# Patient Record
Sex: FEMALE | Race: WHITE | NOT HISPANIC OR LATINO | Employment: OTHER | URBAN - METROPOLITAN AREA
[De-identification: names, ages, dates, MRNs, and addresses within clinical notes are randomized per-mention and may not be internally consistent; named-entity substitution may affect disease eponyms.]

---

## 2019-11-12 ENCOUNTER — APPOINTMENT (EMERGENCY)
Dept: RADIOLOGY | Facility: HOSPITAL | Age: 44
End: 2019-11-12
Payer: COMMERCIAL

## 2019-11-12 ENCOUNTER — APPOINTMENT (EMERGENCY)
Dept: CT IMAGING | Facility: HOSPITAL | Age: 44
End: 2019-11-12
Payer: COMMERCIAL

## 2019-11-12 ENCOUNTER — HOSPITAL ENCOUNTER (EMERGENCY)
Facility: HOSPITAL | Age: 44
Discharge: ANOTHER HOSPITAL | End: 2019-11-12
Attending: EMERGENCY MEDICINE | Admitting: EMERGENCY MEDICINE
Payer: COMMERCIAL

## 2019-11-12 VITALS
RESPIRATION RATE: 11 BRPM | HEART RATE: 82 BPM | OXYGEN SATURATION: 93 % | BODY MASS INDEX: 22.07 KG/M2 | HEIGHT: 62 IN | SYSTOLIC BLOOD PRESSURE: 113 MMHG | WEIGHT: 119.93 LBS | TEMPERATURE: 98.8 F | DIASTOLIC BLOOD PRESSURE: 67 MMHG

## 2019-11-12 DIAGNOSIS — R09.02 HYPOXIA: Primary | ICD-10-CM

## 2019-11-12 LAB
ALBUMIN SERPL BCP-MCNC: 3.3 G/DL (ref 3.5–5)
ALP SERPL-CCNC: 108 U/L (ref 46–116)
ALT SERPL W P-5'-P-CCNC: 30 U/L (ref 12–78)
ANION GAP SERPL CALCULATED.3IONS-SCNC: 16 MMOL/L (ref 4–13)
AST SERPL W P-5'-P-CCNC: 22 U/L (ref 5–45)
ATRIAL RATE: 106 BPM
BASOPHILS # BLD AUTO: 0.05 THOUSANDS/ΜL (ref 0–0.1)
BASOPHILS NFR BLD AUTO: 0 % (ref 0–1)
BILIRUB DIRECT SERPL-MCNC: 0.09 MG/DL (ref 0–0.2)
BILIRUB SERPL-MCNC: 0.4 MG/DL (ref 0.2–1)
BILIRUB UR QL STRIP: NEGATIVE
BUN SERPL-MCNC: 20 MG/DL (ref 5–25)
CA-I BLD-SCNC: 1.15 MMOL/L (ref 1.12–1.32)
CALCIUM SERPL-MCNC: 8.8 MG/DL (ref 8.3–10.1)
CHLORIDE SERPL-SCNC: 105 MMOL/L (ref 100–108)
CLARITY UR: CLEAR
CO2 SERPL-SCNC: 22 MMOL/L (ref 21–32)
COLOR UR: YELLOW
CREAT SERPL-MCNC: 1.03 MG/DL (ref 0.6–1.3)
EOSINOPHIL # BLD AUTO: 0.1 THOUSAND/ΜL (ref 0–0.61)
EOSINOPHIL NFR BLD AUTO: 1 % (ref 0–6)
ERYTHROCYTE [DISTWIDTH] IN BLOOD BY AUTOMATED COUNT: 12.3 % (ref 11.6–15.1)
FLUAV RNA NPH QL NAA+PROBE: NORMAL
FLUBV RNA NPH QL NAA+PROBE: NORMAL
GFR SERPL CREATININE-BSD FRML MDRD: 68 ML/MIN/1.73SQ M
GLUCOSE SERPL-MCNC: 109 MG/DL (ref 65–140)
GLUCOSE UR STRIP-MCNC: NEGATIVE MG/DL
HCT VFR BLD AUTO: 41.4 % (ref 34.8–46.1)
HGB BLD-MCNC: 13.5 G/DL (ref 11.5–15.4)
HGB UR QL STRIP.AUTO: NEGATIVE
IMM GRANULOCYTES # BLD AUTO: 0.1 THOUSAND/UL (ref 0–0.2)
IMM GRANULOCYTES NFR BLD AUTO: 1 % (ref 0–2)
KETONES UR STRIP-MCNC: NEGATIVE MG/DL
LACTATE SERPL-SCNC: 1.8 MMOL/L (ref 0.5–2)
LEUKOCYTE ESTERASE UR QL STRIP: NEGATIVE
LYMPHOCYTES # BLD AUTO: 1.51 THOUSANDS/ΜL (ref 0.6–4.47)
LYMPHOCYTES NFR BLD AUTO: 11 % (ref 14–44)
MAGNESIUM SERPL-MCNC: 1.3 MG/DL (ref 1.6–2.6)
MCH RBC QN AUTO: 31.6 PG (ref 26.8–34.3)
MCHC RBC AUTO-ENTMCNC: 32.6 G/DL (ref 31.4–37.4)
MCV RBC AUTO: 97 FL (ref 82–98)
MONOCYTES # BLD AUTO: 0.75 THOUSAND/ΜL (ref 0.17–1.22)
MONOCYTES NFR BLD AUTO: 6 % (ref 4–12)
NEUTROPHILS # BLD AUTO: 10.69 THOUSANDS/ΜL (ref 1.85–7.62)
NEUTS SEG NFR BLD AUTO: 81 % (ref 43–75)
NITRITE UR QL STRIP: NEGATIVE
NRBC BLD AUTO-RTO: 0 /100 WBCS
NT-PROBNP SERPL-MCNC: 218 PG/ML
P AXIS: 63 DEGREES
PH UR STRIP.AUTO: 5.5 [PH]
PHOSPHATE SERPL-MCNC: 2.6 MG/DL (ref 2.7–4.5)
PLATELET # BLD AUTO: 189 THOUSANDS/UL (ref 149–390)
PMV BLD AUTO: 9.2 FL (ref 8.9–12.7)
POTASSIUM SERPL-SCNC: 3.5 MMOL/L (ref 3.5–5.3)
PR INTERVAL: 134 MS
PROT SERPL-MCNC: 7.4 G/DL (ref 6.4–8.2)
PROT UR STRIP-MCNC: NEGATIVE MG/DL
QRS AXIS: 78 DEGREES
QRSD INTERVAL: 72 MS
QT INTERVAL: 316 MS
QTC INTERVAL: 419 MS
RBC # BLD AUTO: 4.27 MILLION/UL (ref 3.81–5.12)
RSV RNA NPH QL NAA+PROBE: NORMAL
SODIUM SERPL-SCNC: 143 MMOL/L (ref 136–145)
SP GR UR STRIP.AUTO: 1.02 (ref 1–1.03)
T WAVE AXIS: 53 DEGREES
TROPONIN I SERPL-MCNC: <0.02 NG/ML
UROBILINOGEN UR QL STRIP.AUTO: 0.2 E.U./DL
VENTRICULAR RATE: 106 BPM
WBC # BLD AUTO: 13.2 THOUSAND/UL (ref 4.31–10.16)

## 2019-11-12 PROCEDURE — 71046 X-RAY EXAM CHEST 2 VIEWS: CPT

## 2019-11-12 PROCEDURE — 87205 SMEAR GRAM STAIN: CPT | Performed by: PHYSICIAN ASSISTANT

## 2019-11-12 PROCEDURE — 87631 RESP VIRUS 3-5 TARGETS: CPT | Performed by: PHYSICIAN ASSISTANT

## 2019-11-12 PROCEDURE — 82330 ASSAY OF CALCIUM: CPT | Performed by: PHYSICIAN ASSISTANT

## 2019-11-12 PROCEDURE — 84484 ASSAY OF TROPONIN QUANT: CPT | Performed by: PHYSICIAN ASSISTANT

## 2019-11-12 PROCEDURE — 87040 BLOOD CULTURE FOR BACTERIA: CPT | Performed by: PHYSICIAN ASSISTANT

## 2019-11-12 PROCEDURE — 36415 COLL VENOUS BLD VENIPUNCTURE: CPT | Performed by: PHYSICIAN ASSISTANT

## 2019-11-12 PROCEDURE — 96366 THER/PROPH/DIAG IV INF ADDON: CPT

## 2019-11-12 PROCEDURE — 85025 COMPLETE CBC W/AUTO DIFF WBC: CPT | Performed by: PHYSICIAN ASSISTANT

## 2019-11-12 PROCEDURE — 87081 CULTURE SCREEN ONLY: CPT | Performed by: PHYSICIAN ASSISTANT

## 2019-11-12 PROCEDURE — 96361 HYDRATE IV INFUSION ADD-ON: CPT

## 2019-11-12 PROCEDURE — 80076 HEPATIC FUNCTION PANEL: CPT | Performed by: PHYSICIAN ASSISTANT

## 2019-11-12 PROCEDURE — 96367 TX/PROPH/DG ADDL SEQ IV INF: CPT

## 2019-11-12 PROCEDURE — 93005 ELECTROCARDIOGRAM TRACING: CPT

## 2019-11-12 PROCEDURE — 96365 THER/PROPH/DIAG IV INF INIT: CPT

## 2019-11-12 PROCEDURE — 96368 THER/DIAG CONCURRENT INF: CPT

## 2019-11-12 PROCEDURE — 71250 CT THORAX DX C-: CPT

## 2019-11-12 PROCEDURE — 99283 EMERGENCY DEPT VISIT LOW MDM: CPT | Performed by: PHYSICIAN ASSISTANT

## 2019-11-12 PROCEDURE — 96376 TX/PRO/DX INJ SAME DRUG ADON: CPT

## 2019-11-12 PROCEDURE — 87186 SC STD MICRODIL/AGAR DIL: CPT | Performed by: PHYSICIAN ASSISTANT

## 2019-11-12 PROCEDURE — 84100 ASSAY OF PHOSPHORUS: CPT | Performed by: PHYSICIAN ASSISTANT

## 2019-11-12 PROCEDURE — 93010 ELECTROCARDIOGRAM REPORT: CPT | Performed by: INTERNAL MEDICINE

## 2019-11-12 PROCEDURE — 87077 CULTURE AEROBIC IDENTIFY: CPT | Performed by: PHYSICIAN ASSISTANT

## 2019-11-12 PROCEDURE — 87070 CULTURE OTHR SPECIMN AEROBIC: CPT | Performed by: PHYSICIAN ASSISTANT

## 2019-11-12 PROCEDURE — 87184 SC STD DISK METHOD PER PLATE: CPT | Performed by: PHYSICIAN ASSISTANT

## 2019-11-12 PROCEDURE — 83735 ASSAY OF MAGNESIUM: CPT | Performed by: PHYSICIAN ASSISTANT

## 2019-11-12 PROCEDURE — 83880 ASSAY OF NATRIURETIC PEPTIDE: CPT | Performed by: PHYSICIAN ASSISTANT

## 2019-11-12 PROCEDURE — 99285 EMERGENCY DEPT VISIT HI MDM: CPT

## 2019-11-12 PROCEDURE — 80048 BASIC METABOLIC PNL TOTAL CA: CPT | Performed by: PHYSICIAN ASSISTANT

## 2019-11-12 PROCEDURE — 81003 URINALYSIS AUTO W/O SCOPE: CPT | Performed by: PHYSICIAN ASSISTANT

## 2019-11-12 PROCEDURE — 87185 SC STD ENZYME DETCJ PER NZM: CPT | Performed by: PHYSICIAN ASSISTANT

## 2019-11-12 PROCEDURE — 96375 TX/PRO/DX INJ NEW DRUG ADDON: CPT

## 2019-11-12 PROCEDURE — 83605 ASSAY OF LACTIC ACID: CPT | Performed by: PHYSICIAN ASSISTANT

## 2019-11-12 RX ORDER — ATOVAQUONE 750 MG/5ML
750 SUSPENSION ORAL DAILY
COMMUNITY
End: 2020-03-07 | Stop reason: HOSPADM

## 2019-11-12 RX ORDER — LINEZOLID 2 MG/ML
600 INJECTION, SOLUTION INTRAVENOUS EVERY 12 HOURS
Status: DISCONTINUED | OUTPATIENT
Start: 2019-11-12 | End: 2019-11-12 | Stop reason: HOSPADM

## 2019-11-12 RX ORDER — ALPRAZOLAM 1 MG/1
TABLET ORAL 2 TIMES DAILY PRN
COMMUNITY
End: 2020-03-07 | Stop reason: HOSPADM

## 2019-11-12 RX ORDER — DEXLANSOPRAZOLE 60 MG/1
60 CAPSULE, DELAYED RELEASE ORAL DAILY
COMMUNITY
End: 2020-03-07 | Stop reason: HOSPADM

## 2019-11-12 RX ORDER — PREDNISONE 10 MG/1
TABLET ORAL DAILY
COMMUNITY
End: 2020-03-07 | Stop reason: HOSPADM

## 2019-11-12 RX ORDER — TACROLIMUS 1 MG/1
2 CAPSULE ORAL EVERY 12 HOURS SCHEDULED
COMMUNITY
End: 2020-03-07 | Stop reason: HOSPADM

## 2019-11-12 RX ORDER — SODIUM CHLORIDE, SODIUM GLUCONATE, SODIUM ACETATE, POTASSIUM CHLORIDE, MAGNESIUM CHLORIDE, SODIUM PHOSPHATE, DIBASIC, AND POTASSIUM PHOSPHATE .53; .5; .37; .037; .03; .012; .00082 G/100ML; G/100ML; G/100ML; G/100ML; G/100ML; G/100ML; G/100ML
100 INJECTION, SOLUTION INTRAVENOUS CONTINUOUS
Status: DISCONTINUED | OUTPATIENT
Start: 2019-11-12 | End: 2019-11-12

## 2019-11-12 RX ORDER — MAGNESIUM SULFATE HEPTAHYDRATE 40 MG/ML
2 INJECTION, SOLUTION INTRAVENOUS ONCE
Status: COMPLETED | OUTPATIENT
Start: 2019-11-12 | End: 2019-11-12

## 2019-11-12 RX ORDER — ONDANSETRON 2 MG/ML
4 INJECTION INTRAMUSCULAR; INTRAVENOUS ONCE
Status: COMPLETED | OUTPATIENT
Start: 2019-11-12 | End: 2019-11-12

## 2019-11-12 RX ORDER — HYDROMORPHONE HCL/PF 1 MG/ML
0.5 SYRINGE (ML) INJECTION ONCE
Status: COMPLETED | OUTPATIENT
Start: 2019-11-12 | End: 2019-11-12

## 2019-11-12 RX ORDER — HYDROMORPHONE HCL/PF 1 MG/ML
0.5 SYRINGE (ML) INJECTION ONCE
Status: DISCONTINUED | OUTPATIENT
Start: 2019-11-12 | End: 2019-11-12

## 2019-11-12 RX ORDER — VALGANCICLOVIR 450 MG/1
900 TABLET, FILM COATED ORAL
COMMUNITY
End: 2020-03-07 | Stop reason: HOSPADM

## 2019-11-12 RX ORDER — DRONABINOL 5 MG/1
5 CAPSULE ORAL
COMMUNITY
End: 2020-03-07 | Stop reason: HOSPADM

## 2019-11-12 RX ORDER — ALENDRONATE SODIUM 70 MG/1
70 TABLET ORAL
COMMUNITY
End: 2020-03-07 | Stop reason: HOSPADM

## 2019-11-12 RX ORDER — ACETAMINOPHEN 325 MG/1
650 TABLET ORAL ONCE
Status: COMPLETED | OUTPATIENT
Start: 2019-11-12 | End: 2019-11-12

## 2019-11-12 RX ORDER — HYDROMORPHONE HCL/PF 1 MG/ML
0.5 SYRINGE (ML) INJECTION
Status: DISCONTINUED | OUTPATIENT
Start: 2019-11-12 | End: 2019-11-12 | Stop reason: HOSPADM

## 2019-11-12 RX ORDER — DIPHENOXYLATE HYDROCHLORIDE AND ATROPINE SULFATE 2.5; .025 MG/1; MG/1
1 TABLET ORAL DAILY
COMMUNITY
End: 2020-03-07 | Stop reason: HOSPADM

## 2019-11-12 RX ORDER — ALBUTEROL SULFATE 1.25 MG/3ML
1 SOLUTION RESPIRATORY (INHALATION) EVERY 4 HOURS PRN
COMMUNITY
End: 2021-09-26 | Stop reason: HOSPADM

## 2019-11-12 RX ORDER — SODIUM CHLORIDE 9 MG/ML
100 INJECTION, SOLUTION INTRAVENOUS CONTINUOUS
Status: DISCONTINUED | OUTPATIENT
Start: 2019-11-12 | End: 2019-11-12 | Stop reason: HOSPADM

## 2019-11-12 RX ORDER — GABAPENTIN 100 MG/1
100 CAPSULE ORAL 2 TIMES DAILY PRN
COMMUNITY
End: 2020-03-07 | Stop reason: HOSPADM

## 2019-11-12 RX ORDER — FERROUS SULFATE 325(65) MG
325 TABLET ORAL 2 TIMES DAILY WITH MEALS
COMMUNITY
End: 2020-03-07 | Stop reason: HOSPADM

## 2019-11-12 RX ADMIN — ONDANSETRON 4 MG: 2 INJECTION INTRAMUSCULAR; INTRAVENOUS at 20:15

## 2019-11-12 RX ADMIN — ACETAMINOPHEN 650 MG: 325 TABLET, FILM COATED ORAL at 17:51

## 2019-11-12 RX ADMIN — SODIUM CHLORIDE 1000 ML: 0.9 INJECTION, SOLUTION INTRAVENOUS at 10:02

## 2019-11-12 RX ADMIN — SODIUM CHLORIDE 100 ML/HR: 0.9 INJECTION, SOLUTION INTRAVENOUS at 17:35

## 2019-11-12 RX ADMIN — HYDROMORPHONE HYDROCHLORIDE 0.5 MG: 1 INJECTION, SOLUTION INTRAMUSCULAR; INTRAVENOUS; SUBCUTANEOUS at 10:12

## 2019-11-12 RX ADMIN — SODIUM CHLORIDE 1000 ML: 0.9 INJECTION, SOLUTION INTRAVENOUS at 13:58

## 2019-11-12 RX ADMIN — HYDROMORPHONE HYDROCHLORIDE 0.5 MG: 1 INJECTION, SOLUTION INTRAMUSCULAR; INTRAVENOUS; SUBCUTANEOUS at 16:28

## 2019-11-12 RX ADMIN — HYDROMORPHONE HYDROCHLORIDE 0.5 MG: 1 INJECTION, SOLUTION INTRAMUSCULAR; INTRAVENOUS; SUBCUTANEOUS at 20:09

## 2019-11-12 RX ADMIN — ONDANSETRON 4 MG: 2 INJECTION INTRAMUSCULAR; INTRAVENOUS at 12:29

## 2019-11-12 RX ADMIN — LINEZOLID 600 MG: 600 INJECTION, SOLUTION INTRAVENOUS at 11:01

## 2019-11-12 RX ADMIN — PANCRELIPASE 24000 UNITS: 24000; 76000; 120000 CAPSULE, DELAYED RELEASE PELLETS ORAL at 15:01

## 2019-11-12 RX ADMIN — PANCRELIPASE 24000 UNITS: 24000; 76000; 120000 CAPSULE, DELAYED RELEASE PELLETS ORAL at 14:54

## 2019-11-12 RX ADMIN — MAGNESIUM SULFATE HEPTAHYDRATE 2 G: 40 INJECTION, SOLUTION INTRAVENOUS at 13:35

## 2019-11-12 RX ADMIN — HYDROMORPHONE HYDROCHLORIDE 0.5 MG: 1 INJECTION, SOLUTION INTRAMUSCULAR; INTRAVENOUS; SUBCUTANEOUS at 12:29

## 2019-11-12 RX ADMIN — MEROPENEM 1000 MG: 1 INJECTION, POWDER, FOR SOLUTION INTRAVENOUS at 12:07

## 2019-11-12 RX ADMIN — POTASSIUM & SODIUM PHOSPHATES POWDER PACK 280-160-250 MG 1 PACKET: 280-160-250 PACK at 13:41

## 2019-11-12 NOTE — ED PROVIDER NOTES
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, DVT related to prolonged hospital stay who presents to the ED w shortness of breath  She has been intubated multiple times, has had a tracehostomy, was cannulated for VV ECMO in June/ July of 2018 for AHRF, ptx  Was admitted in June of 2018 for R sided pna and AHRF  Presents today w L sided lower side and chest pain  Developed sob this morning, checked her home O2 sat which was 84% on RA, does not wear home O2  Daughter had the flu about a week ago so she did complete a prophylactic course of tamifu which finished yesterday  She began coughing up green phlegm today  Feels more sob w ambulating  Pain worsened w coughing  No pain on inspiration  Fever of 101 at home this morning for which she took tylenol  No abdominal pain although has had a decreased appetite over the past few days  Was feeling slightly under the weather the past few days but symptoms all worsened this morning  None       Past Medical History:   Diagnosis Date    Asthma     Chronic pain     Cystic fibrosis ([facility] [address] )     Transplant recipient        Past Surgical History:   Procedure Laterality Date    LUNG SURGERY         History reviewed  No pertinent family history  I have reviewed and agree with the history as documented  Social History     Tobacco Use    Smoking status: Never Smoker    Smokeless tobacco: Never Used   Substance Use Topics    Alcohol use: Not Currently     Frequency: Never    Drug use: Never        Review of Systems   Constitutional: Positive for activity change, appetite change and fever  Negative for chills and diaphoresis  HENT: Negative for congestion and sore throat  Eyes: Negative for visual disturbance  Respiratory: Positive for cough and shortness of breath  Negative for chest tightness and wheezing  Cardiovascular: Negative for chest pain and leg swelling  Gastrointestinal: Negative for abdominal pain, constipation, diarrhea, nausea and vomiting  Genitourinary: Negative for difficulty urinating, dysuria, frequency, hematuria, urgency, vaginal bleeding, vaginal discharge and vaginal pain  Musculoskeletal: Negative for arthralgias and myalgias  Neurological: Negative for dizziness, weakness, light-headedness, numbness and headaches  Psychiatric/Behavioral: The patient is not nervous/anxious  Physical Exam  Physical Exam   Constitutional: She is oriented to person, place, and time  She appears well-developed and well-nourished  No distress  HENT:   Head: Normocephalic and atraumatic  Dry mucous membranes   Eyes: Pupils are equal, round, and reactive to light  Neck: Neck supple  No tracheal deviation present  Cardiovascular: Normal rate, regular rhythm and intact distal pulses  Exam reveals no gallop and no friction rub  No murmur heard  Pulmonary/Chest: Effort normal  No respiratory distress  She has no wheezes  She has rhonchi in the right middle field, the right lower field, the left middle field and the left lower field  She has no rales  Abdominal: Soft  Bowel sounds are normal  She exhibits no distension and no mass  There is no tenderness  There is no guarding  Musculoskeletal: She exhibits no edema or deformity  Neurological: She is alert and oriented to person, place, and time  Skin: Skin is warm and dry  She is not diaphoretic  Dry skin   Psychiatric: She has a normal mood and affect  Her behavior is normal    Nursing note and vitals reviewed        Vital Signs  ED Triage Vitals [11/12/19 0914]   Temperature Pulse Respirations Blood Pressure SpO2   (!) 101 °F (38 3 °C) (!) 107 (!) 24 136/81 91 %      Temp Source Heart Rate Source Patient Position - Orthostatic VS BP Location FiO2 (%)   Oral Monitor Sitting Right arm --      Pain Score       7           Vitals:    11/12/19 1200 11/12/19 1230 11/12/19 1343 11/12/19 1430   BP: 109/66 107/72 96/58 101/55   Pulse: 79 89 82 72   Patient Position - Orthostatic VS: Lying Lying Sitting Lying         Visual Acuity      ED Medications  Medications   meropenem (MERREM) 1,000 mg in sodium chloride 0 9 % 100 mL IVPB (0 mg Intravenous Stopped 11/12/19 1529)   linezolid (ZYVOX) IVPB (premix) 600 mg (0 mg Intravenous Stopped 11/12/19 1204)   potassium-sodium phosphates (PHOS-NAK) packet 1 packet (1 packet Oral Given 11/12/19 1341)   sodium chloride 0 9 % infusion (has no administration in time range)   HYDROmorphone (DILAUDID) injection 0 5 mg (0 5 mg Intravenous Given 11/12/19 1628)   sodium chloride 0 9 % bolus 1,000 mL (0 mL Intravenous Stopped 11/12/19 1209)   HYDROmorphone (DILAUDID) injection 0 5 mg (0 5 mg Intravenous Given 11/12/19 1012)   magnesium sulfate 2 g/50 mL IVPB (premix) 2 g (0 g Intravenous Stopped 11/12/19 1547)   ondansetron (ZOFRAN) injection 4 mg (4 mg Intravenous Given 11/12/19 1229)   HYDROmorphone (DILAUDID) injection 0 5 mg (0 5 mg Intravenous Given 11/12/19 1229)   pancrelipase (Lip-Prot-Amyl) (CREON) delayed release capsule 24,000 Units (24,000 Units Oral Given 11/12/19 1501)   pancrelipase (Lip-Prot-Amyl) (CREON) delayed release capsule 24,000 Units (24,000 Units Oral Given 11/12/19 1454)   sodium chloride 0 9 % bolus 1,000 mL (1,000 mL Intravenous New Bag 11/12/19 1358)       Diagnostic Studies  Results Reviewed     Procedure Component Value Units Date/Time    UA w Reflex to Microscopic w Reflex to Culture [[id number]] Collected:  11/12/19 4496    Lab Status:  Final result Specimen:  Urine, Clean Catch Updated:  11/12/19 1554     Color, UA Yellow     Clarity, UA Clear     Specific Gravity, UA 1 025     pH, UA 5 5     Leukocytes, UA Negative     Nitrite, UA Negative Protein, UA Negative mg/dl      Glucose, UA Negative mg/dl      Ketones, UA Negative mg/dl      Urobilinogen, UA 0 2 E U /dl      Bilirubin, UA Negative     Blood, UA Negative    Blood culture #1 [[id number]] Collected:  11/12/19 1004    Lab Status:  Preliminary result Specimen:  Blood from Arm, Right Updated:  11/12/19 1401     Blood Culture Received in Microbiology Lab  Culture in Progress  Blood culture #2 [[id number]] Collected:  11/12/19 1001    Lab Status:  Preliminary result Specimen:  Blood from Central Venous Line Updated:  11/12/19 1401     Blood Culture Received in Microbiology Lab  Culture in Progress  Sputum culture and Gram stain [[id number]] Collected:  11/12/19 1215    Lab Status: In process Specimen:  Expectorated Sputum Updated:  11/12/19 1218    Influenza A/B and RSV PCR [[id number]]  (Normal) Collected:  11/12/19 1006    Lab Status:  Final result Specimen:  Nose Updated:  11/12/19 1131     INFLUENZA A PCR None Detected     INFLUENZA B PCR None Detected     RSV PCR None Detected    Lactic acid, plasma [[id number]]  (Normal) Collected:  11/12/19 1001    Lab Status:  Final result Specimen:  Blood from Central Venous Line Updated:  11/12/19 1048     LACTIC ACID 1 8 mmol/L     Narrative:       Result may be elevated if tourniquet was used during collection      Basic metabolic panel [[id number]]  (Abnormal) Collected:  11/12/19 1001    Lab Status:  Final result Specimen:  Blood from Central Venous Line Updated:  11/12/19 1037     Sodium 143 mmol/L      Potassium 3 5 mmol/L      Chloride 105 mmol/L      CO2 22 mmol/L      ANION GAP 16 mmol/L      BUN 20 mg/dL      Creatinine 1 03 mg/dL      Glucose 109 mg/dL      Calcium 8 8 mg/dL      eGFR 68 ml/min/1 73sq m     Narrative:       [place] guidelines for Chronic Kidney Disease (CKD):     Stage 1 with normal or high GFR (GFR > 90 mL/min/1 73 square meters)    Stage 2 Mild CKD (GFR = 60-89 mL/min/1 73 square meters)    Stage 3A Moderate CKD (GFR = 45-59 mL/min/1 73 square meters)    Stage 3B Moderate CKD (GFR = 30-44 mL/min/1 73 square meters)    Stage 4 Severe CKD (GFR = 15-29 mL/min/1 73 square meters)    Stage 5 End Stage CKD (GFR <15 mL/min/1 73 square meters)  Note: GFR calculation is accurate only with a steady state creatinine    Hepatic function panel [[id number]]  (Abnormal) Collected:  11/12/19 1001    Lab Status:  Final result Specimen:  Blood from Central Venous Line Updated:  11/12/19 1037     Total Bilirubin 0 40 mg/dL      Bilirubin, Direct 0 09 mg/dL      Alkaline Phosphatase 108 U/L      AST 22 U/L      ALT 30 U/L      Total Protein 7 4 g/dL      Albumin 3 3 g/dL     NT-BNP PRO [[id number]]  (Abnormal) Collected:  11/12/19 1001    Lab Status:  Final result Specimen:  Blood from Central Venous Line Updated:  11/12/19 1037     NT-proBNP 218 pg/mL     Magnesium [[id number]]  (Abnormal) Collected:  11/12/19 1001    Lab Status:  Final result Specimen:  Blood from Central Venous Line Updated:  11/12/19 1033     Magnesium 1 3 mg/dL     Phosphorus [[id number]]  (Abnormal) Collected:  11/12/19 1001    Lab Status:  Final result Specimen:  Blood from Central Venous Line Updated:  11/12/19 1033     Phosphorus 2 6 mg/dL     Calcium, ionized [[id number]]  (Normal) Collected:  11/12/19 1001    Lab Status:  Final result Specimen:  Blood from Arm, Right Updated:  11/12/19 1030     Calcium, Ionized 1 15 mmol/L     Troponin I [[id number]]  (Normal) Collected:  11/12/19 1001    Lab Status:  Final result Specimen:  Blood from Arm, Right Updated:  11/12/19 1030     Troponin I <0 02 ng/mL     MRSA culture [[id number]] Collected:  11/12/19 1006    Lab Status:   In process Specimen:  Nares from Nose Updated:  11/12/19 1020    CBC and differential [[id number]]  (Abnormal) Collected:  11/12/19 1001    Lab Status:  Final result Specimen:  Blood from Central Venous Line Updated:  11/12/19 1014     WBC 13 20 Thousand/uL      RBC 4 27 Million/uL Hemoglobin 13 5 g/dL      Hematocrit 41 4 %      MCV 97 fL      MCH 31 6 pg      MCHC 32 6 g/dL      RDW 12 3 %      MPV 9 2 fL      Platelets 187 Thousands/uL      nRBC 0 /100 WBCs      Neutrophils Relative 81 %      Immat GRANS % 1 %      Lymphocytes Relative 11 %      Monocytes Relative 6 %      Eosinophils Relative 1 %      Basophils Relative 0 %      Neutrophils Absolute 10 69 Thousands/µL      Immature Grans Absolute 0 10 Thousand/uL      Lymphocytes Absolute 1 51 Thousands/µL      Monocytes Absolute 0 75 Thousand/µL      Eosinophils Absolute 0 10 Thousand/µL      Basophils Absolute 0 05 Thousands/µL                  CT chest without contrast   Final Result by Hiram Jesus MD (11/12 1126)      1  Tree-in-bud and groundglass opacities throughout the left lung, consistent with developing pneumonia   2  Bronchiectasis throughout the right lung consistent with chronic rejection      The study was marked in EPIC for immediate notification  Workstation performed: [id number]         XR chest 2 views   Final Result by Sussy Lechuga MD (11/12 1028)      No acute cardiopulmonary disease  Double lung transplant  Workstation performed: [id number]                    Procedures  Procedures       ED Course  ED Course as of Nov 12 1657   Tue Nov 12, 2019   1021 Discussed case w Dr Arturo Salmon from [facility] who is one of the patient's transplant specialists  He advised starting meropenem and linezolid  Qtc, platelet count are appropriate  We will hold off on CTA as her DVT hx seems linked to prolonged hospitalization and infectious etiology seems much more likely  Will limit dye load for now and obtain CT chest wo        1028 EKG Interpretation    Rate: 106 BPM  Rhythm: sinus tachycardia  Axis: normal  Intervals: Normal, no blocks, QTc 419ms  Q waves: no  T waves: flattened somewhat in III  ST segments: no dep / elevation     Impression: abnormal EKG   EKG for comparison: no prior within out network  EKG interpreted by me  Initial Sepsis Screening     Row Name 11/12/19 0935                Is the patient's history suggestive of a new or worsening infection? (!) Yes (Proceed)  -SB        Suspected source of infection  pneumonia  -SB        Are two or more of the following signs & symptoms of infection both present and new to the patient? (!) Yes (Proceed)  -SB        Indicate SIRS criteria  Hyperthemia > 38 3C (100 9F); Tachycardia > 90 bpm;Tachypnea > 20 resp per min  -SB        If the answer is yes to both questions, suspicion of sepsis is present  --        If severe sepsis is present AND tissue hypoperfusion perists in the hour after fluid resuscitation or lactate > 4, the patient meets criteria for SEPTIC SHOCK  --        Are any of the following organ dysfunction criteria present within 6 hours of suspected infection and SIRS criteria that are NOT considered to be chronic conditions?   (!) Yes  -SB        Organ dysfunction  --        Date of presentation of severe sepsis  --        Time of presentation of severe sepsis  --        Tissue hypoperfusion persists in the hour after crystalloid fluid administration, evidenced, by either:  --        Was hypotension present within one hour of the conclusion of crystalloid fluid administration?  --        Date of presentation of septic shock  --        Time of presentation of septic shock  --          User Key  (r) = Recorded By, (t) = Taken By, (c) = Cosigned By    [address] Name Provider Type    PAUL Pro PA-C Physician Assistant                  MDM  Number of Diagnoses or Management Options  Diagnosis management comments: DDX includes but not ltd to:   Concern for pna, consider bacterial v influenza given sick contact of flu  Doubt PE, vitals, labs more consistent w infection     Plan is to obtain:  CBC to check for anemia, leukocytosis, hydration status  Chemistry panel to check for lyte abnormalities, organ function EKG/trop to check for ischemic changes   Lactic acid as a marker of end organ dysfunction  Blood cultures, urine culture, sputum culture, MRSA swab, flu swab   CXR to check for congestive changes, active pulmonary disease     Based on results:  CXR unrevealing, will obtain CT chest wo  Transfer to [facility] for further management  Disposition  Final diagnoses:   Hypoxia     Time reflects when diagnosis was documented in both MDM as applicable and the Disposition within this note     Time User Action Codes Description Comment    11/12/2019 10:56 AM Libby Bazzi Add [R09 02] Hypoxia       ED Disposition     ED Disposition Condition Date/Time Comment    Transfer to Another [address] Nov 12, 2019 10:56 AM Marvel Chiragzina Tong should be transferred out to [facility]          MD Documentation      Most Recent Value   Patient Condition  The patient has been stabilized such that within reasonable medical probability, no material deterioration of the patient condition or the condition of the unborn child(joseph) is likely to result from the transfer   Reason for Transfer  Level of Care needed not available at this facility   Benefits of Transfer  Specialized equipment and/or services available at the receiving facility (Include comment)________________________ [facility] specialist, Transplant specialist ]   Risks of Transfer  Potential for delay in receiving treatment, Potential deterioration of medical condition, Loss of IV, Increased discomfort during transfer, Possible worsening of condition or death during transfer   Accepting Physician  Dr Altagracia Padgett Name, [address]    (Name & Tel number)  [facility] transfer center and PACS   Sending MD Dr Katya Terrell PA-C   Provider Certification  General risk, such as traffic hazards, adverse weather conditions, rough terrain or turbulence, possible failure of equipment (including vehicle or aircraft), or consequences of actions of persons outside the control of the transport personnel, Unanticipated needs of medical equipment and personnel during transport, Risk of worsening condition, The possibility of a transport vehicle being unavailable      RN Documentation      [facility] [address] Name, [address]    (Name & Tel number)  [facility] transfer center and PACS      Follow-up Information    None         Patient's Medications    No medications on file     No discharge procedures on file      ED Provider  Electronically Signed by           Andrew Field PA-C  11/12/19 8916

## 2019-11-12 NOTE — ED NOTES
TRANSFER INFORMATION     TIME:     1930    TRANSFER DESTINATION:    [facility] 1130    TRANSFER CREW:       JOSSE    ACCEPTING DOCTOR:       Dr Cuco Burgess    PHONE NUMBER TO CALL REPORT:     [phone] , RN  11/12/19 8541

## 2019-11-12 NOTE — EMTALA/ACUTE CARE TRANSFER
[address] [facility] [address]  [address] [place] [address]  Dept: [phone]      EMTALA TRANSFER CONSENT    NAME Marvel Fortune                                         1975                              MRN [id number]    I have been informed of my rights regarding examination, treatment, and transfer   by Dr Janice Evans MD    Benefits: Specialized equipment and/or services available at the receiving facility (Include comment)________________________(CF specialist, Transplant specialist )    Risks: Potential for delay in receiving treatment, Potential deterioration of medical condition, Loss of IV, Increased discomfort during transfer, Possible worsening of condition or death during transfer      Consent for Transfer:  I acknowledge that my medical condition has been evaluated and explained to me by the emergency department physician or other qualified medical person and/or my attending physician, who has recommended that I be transferred to the service of  Accepting Physician: Dr Brenda Carlson at [address] [place] [address] Rd Name, [facility] : [facility]  The above potential benefits of such transfer, the potential risks associated with such transfer, and the probable risks of not being transferred have been explained to me, and I fully understand them  The doctor has explained that, in my case, the benefits of transfer outweigh the risks  I agree to be transferred  I authorize the performance of emergency medical procedures and treatments upon me in both transit and upon arrival at the receiving facility  Additionally, I authorize the release of any and all medical records to the receiving facility and request they be transported with me, if possible  I understand that the safest mode of transportation during a medical emergency is an ambulance and that the Hospital advocates the use of this mode of transport   Risks of traveling to the receiving facility by car, including absence of medical control, life sustaining equipment, such as oxygen, and medical personnel has been explained to me and I fully understand them  (JUSTIN CORRECT BOX BELOW)  [  ]  I consent to the stated transfer and to be transported by ambulance/helicopter  [  ]  I consent to the stated transfer, but refuse transportation by ambulance and accept full responsibility for my transportation by car  I understand the risks of non-ambulance transfers and I exonerate the Hospital and its staff from any deterioration in my condition that results from this refusal     X___________________________________________    DATE  19  TIME________  Signature of patient or legally responsible individual signing on patient behalf           RELATIONSHIP TO PATIENT_________________________          Provider Certification    NAME René Souza                                         1975                              MRN [id number]    A medical screening exam was performed on the above named patient  Based on the examination:    Condition Necessitating Transfer The encounter diagnosis was Hypoxia      Patient Condition: The patient has been stabilized such that within reasonable medical probability, no material deterioration of the patient condition or the condition of the unborn child(joseph) is likely to result from the transfer    Reason for Transfer: Level of Care needed not available at this facility    Transfer Requirements: [place] [address]   · Space available and qualified personnel available for treatment as acknowledged by [facility] transfer center and PACS  · Agreed to accept transfer and to provide appropriate medical treatment as acknowledged by       Dr Italia Rivera  · Appropriate medical records of the examination and treatment of the patient are provided at the time of transfer   [address] _______  · Transfer will be performed by qualified personnel from    and appropriate transfer equipment as required, including the use of necessary and appropriate life support measures  Provider Certification: I have examined the patient and explained the following risks and benefits of being transferred/refusing transfer to the patient/family:  General risk, such as traffic hazards, adverse weather conditions, rough terrain or turbulence, possible failure of equipment (including vehicle or aircraft), or consequences of actions of persons outside the control of the transport personnel, Unanticipated needs of medical equipment and personnel during transport, Risk of worsening condition, The possibility of a transport vehicle being unavailable      Based on these reasonable risks and benefits to the patient and/or the unborn child(joseph), and based upon the information available at the time of the patients examination, I certify that the medical benefits reasonably to be expected from the provision of appropriate medical treatments at another medical facility outweigh the increasing risks, if any, to the individuals medical condition, and in the case of labor to the unborn child, from effecting the transfer      X_____Gianna Call_______________________________________ DATE 11/12/19        TIME___11:57 AM  ____      ORIGINAL - SEND TO MEDICAL RECORDS   COPY - SEND WITH PATIENT DURING TRANSFER

## 2019-11-13 LAB — MRSA NOSE QL CULT: NORMAL

## 2019-11-16 LAB
BACTERIA SPT RESP CULT: ABNORMAL
GRAM STN SPEC: ABNORMAL

## 2019-11-17 LAB
BACTERIA BLD CULT: NORMAL
BACTERIA BLD CULT: NORMAL

## 2020-01-08 ENCOUNTER — HOSPITAL ENCOUNTER (INPATIENT)
Facility: HOSPITAL | Age: 45
LOS: 2 days | Discharge: HOME | DRG: 660 | End: 2020-01-11
Attending: EMERGENCY MEDICINE | Admitting: INTERNAL MEDICINE
Payer: COMMERCIAL

## 2020-01-08 ENCOUNTER — APPOINTMENT (EMERGENCY)
Dept: CT IMAGING | Facility: HOSPITAL | Age: 45
DRG: 660 | End: 2020-01-08
Payer: COMMERCIAL

## 2020-01-08 DIAGNOSIS — N20.1 URETEROLITHIASIS: ICD-10-CM

## 2020-01-08 DIAGNOSIS — N20.1 OBSTRUCTION OF RIGHT URETEROPELVIC JUNCTION (UPJ) DUE TO STONE: Primary | ICD-10-CM

## 2020-01-08 DIAGNOSIS — K52.9 ENTERITIS: ICD-10-CM

## 2020-01-08 DIAGNOSIS — Z94.2 HISTORY OF LUNG TRANSPLANT (HCC): ICD-10-CM

## 2020-01-08 DIAGNOSIS — K52.9 COLITIS: ICD-10-CM

## 2020-01-08 LAB
ALBUMIN SERPL BCP-MCNC: 3.7 G/DL (ref 3.5–5)
ALP SERPL-CCNC: 94 U/L (ref 46–116)
ALT SERPL W P-5'-P-CCNC: 29 U/L (ref 12–78)
ANION GAP SERPL CALCULATED.3IONS-SCNC: 10 MMOL/L (ref 4–13)
AST SERPL W P-5'-P-CCNC: 15 U/L (ref 5–45)
BACTERIA UR QL AUTO: ABNORMAL /HPF
BASOPHILS # BLD AUTO: 0.02 THOUSANDS/ΜL (ref 0–0.1)
BASOPHILS NFR BLD AUTO: 0 % (ref 0–1)
BILIRUB SERPL-MCNC: 0.3 MG/DL (ref 0.2–1)
BILIRUB UR QL STRIP: NEGATIVE
BUN SERPL-MCNC: 17 MG/DL (ref 5–25)
CALCIUM SERPL-MCNC: 8.6 MG/DL (ref 8.3–10.1)
CHLORIDE SERPL-SCNC: 104 MMOL/L (ref 100–108)
CLARITY UR: ABNORMAL
CO2 SERPL-SCNC: 24 MMOL/L (ref 21–32)
COLOR UR: YELLOW
CREAT SERPL-MCNC: 0.86 MG/DL (ref 0.6–1.3)
EOSINOPHIL # BLD AUTO: 0.02 THOUSAND/ΜL (ref 0–0.61)
EOSINOPHIL NFR BLD AUTO: 0 % (ref 0–6)
ERYTHROCYTE [DISTWIDTH] IN BLOOD BY AUTOMATED COUNT: 13.6 % (ref 11.6–15.1)
GFR SERPL CREATININE-BSD FRML MDRD: 84 ML/MIN/1.73SQ M
GLUCOSE SERPL-MCNC: 196 MG/DL (ref 65–140)
GLUCOSE UR STRIP-MCNC: ABNORMAL MG/DL
HCG SERPL QL: NEGATIVE
HCT VFR BLD AUTO: 39 % (ref 34.8–46.1)
HGB BLD-MCNC: 12.7 G/DL (ref 11.5–15.4)
HGB UR QL STRIP.AUTO: ABNORMAL
IMM GRANULOCYTES # BLD AUTO: 0.04 THOUSAND/UL (ref 0–0.2)
IMM GRANULOCYTES NFR BLD AUTO: 1 % (ref 0–2)
KETONES UR STRIP-MCNC: NEGATIVE MG/DL
LACTATE SERPL-SCNC: 1.6 MMOL/L (ref 0.5–2)
LEUKOCYTE ESTERASE UR QL STRIP: NEGATIVE
LIPASE SERPL-CCNC: 21 U/L (ref 73–393)
LYMPHOCYTES # BLD AUTO: 1.52 THOUSANDS/ΜL (ref 0.6–4.47)
LYMPHOCYTES NFR BLD AUTO: 18 % (ref 14–44)
MCH RBC QN AUTO: 31.1 PG (ref 26.8–34.3)
MCHC RBC AUTO-ENTMCNC: 32.6 G/DL (ref 31.4–37.4)
MCV RBC AUTO: 95 FL (ref 82–98)
MONOCYTES # BLD AUTO: 0.51 THOUSAND/ΜL (ref 0.17–1.22)
MONOCYTES NFR BLD AUTO: 6 % (ref 4–12)
NEUTROPHILS # BLD AUTO: 6.39 THOUSANDS/ΜL (ref 1.85–7.62)
NEUTS SEG NFR BLD AUTO: 75 % (ref 43–75)
NITRITE UR QL STRIP: NEGATIVE
NON-SQ EPI CELLS URNS QL MICRO: ABNORMAL /HPF
NRBC BLD AUTO-RTO: 0 /100 WBCS
PH UR STRIP.AUTO: 5 [PH]
PLATELET # BLD AUTO: 247 THOUSANDS/UL (ref 149–390)
PMV BLD AUTO: 8.9 FL (ref 8.9–12.7)
POTASSIUM SERPL-SCNC: 3.8 MMOL/L (ref 3.5–5.3)
PROT SERPL-MCNC: 7.8 G/DL (ref 6.4–8.2)
PROT UR STRIP-MCNC: ABNORMAL MG/DL
RBC # BLD AUTO: 4.09 MILLION/UL (ref 3.81–5.12)
RBC #/AREA URNS AUTO: ABNORMAL /HPF
SODIUM SERPL-SCNC: 138 MMOL/L (ref 136–145)
SP GR UR STRIP.AUTO: 1.02 (ref 1–1.03)
UROBILINOGEN UR QL STRIP.AUTO: 0.2 E.U./DL
WBC # BLD AUTO: 8.5 THOUSAND/UL (ref 4.31–10.16)
WBC #/AREA URNS AUTO: ABNORMAL /HPF

## 2020-01-08 PROCEDURE — 99285 EMERGENCY DEPT VISIT HI MDM: CPT

## 2020-01-08 PROCEDURE — 80197 ASSAY OF TACROLIMUS: CPT | Performed by: EMERGENCY MEDICINE

## 2020-01-08 PROCEDURE — 99285 EMERGENCY DEPT VISIT HI MDM: CPT | Performed by: EMERGENCY MEDICINE

## 2020-01-08 PROCEDURE — 96374 THER/PROPH/DIAG INJ IV PUSH: CPT

## 2020-01-08 PROCEDURE — 81001 URINALYSIS AUTO W/SCOPE: CPT | Performed by: EMERGENCY MEDICINE

## 2020-01-08 PROCEDURE — 36415 COLL VENOUS BLD VENIPUNCTURE: CPT | Performed by: EMERGENCY MEDICINE

## 2020-01-08 PROCEDURE — 80053 COMPREHEN METABOLIC PANEL: CPT | Performed by: EMERGENCY MEDICINE

## 2020-01-08 PROCEDURE — 83690 ASSAY OF LIPASE: CPT | Performed by: EMERGENCY MEDICINE

## 2020-01-08 PROCEDURE — 85025 COMPLETE CBC W/AUTO DIFF WBC: CPT | Performed by: EMERGENCY MEDICINE

## 2020-01-08 PROCEDURE — 96375 TX/PRO/DX INJ NEW DRUG ADDON: CPT

## 2020-01-08 PROCEDURE — 74177 CT ABD & PELVIS W/CONTRAST: CPT

## 2020-01-08 PROCEDURE — 83605 ASSAY OF LACTIC ACID: CPT | Performed by: EMERGENCY MEDICINE

## 2020-01-08 PROCEDURE — 84703 CHORIONIC GONADOTROPIN ASSAY: CPT | Performed by: EMERGENCY MEDICINE

## 2020-01-08 RX ORDER — ONDANSETRON 2 MG/ML
4 INJECTION INTRAMUSCULAR; INTRAVENOUS ONCE
Status: COMPLETED | OUTPATIENT
Start: 2020-01-08 | End: 2020-01-08

## 2020-01-08 RX ORDER — DIPHENHYDRAMINE HYDROCHLORIDE 50 MG/ML
25 INJECTION INTRAMUSCULAR; INTRAVENOUS ONCE
Status: COMPLETED | OUTPATIENT
Start: 2020-01-08 | End: 2020-01-08

## 2020-01-08 RX ORDER — HYDROMORPHONE HCL/PF 1 MG/ML
1 SYRINGE (ML) INJECTION ONCE
Status: COMPLETED | OUTPATIENT
Start: 2020-01-09 | End: 2020-01-09

## 2020-01-08 RX ORDER — METOCLOPRAMIDE HYDROCHLORIDE 5 MG/ML
10 INJECTION INTRAMUSCULAR; INTRAVENOUS ONCE
Status: COMPLETED | OUTPATIENT
Start: 2020-01-08 | End: 2020-01-08

## 2020-01-08 RX ORDER — KETOROLAC TROMETHAMINE 30 MG/ML
15 INJECTION, SOLUTION INTRAMUSCULAR; INTRAVENOUS ONCE
Status: COMPLETED | OUTPATIENT
Start: 2020-01-08 | End: 2020-01-08

## 2020-01-08 RX ADMIN — DIPHENHYDRAMINE HYDROCHLORIDE 25 MG: 50 INJECTION, SOLUTION INTRAMUSCULAR; INTRAVENOUS at 21:12

## 2020-01-08 RX ADMIN — KETOROLAC TROMETHAMINE 15 MG: 30 INJECTION, SOLUTION INTRAMUSCULAR at 21:01

## 2020-01-08 RX ADMIN — ONDANSETRON 4 MG: 2 INJECTION INTRAMUSCULAR; INTRAVENOUS at 20:57

## 2020-01-08 RX ADMIN — IOHEXOL 100 ML: 350 INJECTION, SOLUTION INTRAVENOUS at 21:45

## 2020-01-08 RX ADMIN — METOCLOPRAMIDE 10 MG: 5 INJECTION, SOLUTION INTRAMUSCULAR; INTRAVENOUS at 21:12

## 2020-01-09 ENCOUNTER — APPOINTMENT (OUTPATIENT)
Dept: RADIOLOGY | Facility: HOSPITAL | Age: 45
DRG: 660 | End: 2020-01-09
Payer: COMMERCIAL

## 2020-01-09 ENCOUNTER — ANESTHESIA EVENT (OUTPATIENT)
Dept: PERIOP | Facility: HOSPITAL | Age: 45
DRG: 660 | End: 2020-01-09
Payer: COMMERCIAL

## 2020-01-09 ENCOUNTER — ANESTHESIA (OUTPATIENT)
Dept: PERIOP | Facility: HOSPITAL | Age: 45
DRG: 660 | End: 2020-01-09
Payer: COMMERCIAL

## 2020-01-09 PROBLEM — Z94.2 HISTORY OF LUNG TRANSPLANT (HCC): Status: ACTIVE | Noted: 2020-01-09

## 2020-01-09 PROBLEM — R73.9 HYPERGLYCEMIA: Status: ACTIVE | Noted: 2020-01-09

## 2020-01-09 PROBLEM — E84.9 CYSTIC FIBROSIS (HCC): Status: ACTIVE | Noted: 2020-01-09

## 2020-01-09 PROBLEM — N20.1 URETEROLITHIASIS: Status: ACTIVE | Noted: 2020-01-09

## 2020-01-09 PROBLEM — N20.1 OBSTRUCTION OF RIGHT URETEROPELVIC JUNCTION (UPJ) DUE TO STONE: Status: ACTIVE | Noted: 2020-01-08

## 2020-01-09 LAB
ANION GAP SERPL CALCULATED.3IONS-SCNC: 7 MMOL/L (ref 4–13)
BASOPHILS # BLD AUTO: 0.02 THOUSANDS/ΜL (ref 0–0.1)
BASOPHILS NFR BLD AUTO: 0 % (ref 0–1)
BUN SERPL-MCNC: 14 MG/DL (ref 5–25)
CALCIUM SERPL-MCNC: 8.3 MG/DL (ref 8.3–10.1)
CHLORIDE SERPL-SCNC: 106 MMOL/L (ref 100–108)
CO2 SERPL-SCNC: 28 MMOL/L (ref 21–32)
CREAT SERPL-MCNC: 0.89 MG/DL (ref 0.6–1.3)
EOSINOPHIL # BLD AUTO: 0.07 THOUSAND/ΜL (ref 0–0.61)
EOSINOPHIL NFR BLD AUTO: 1 % (ref 0–6)
ERYTHROCYTE [DISTWIDTH] IN BLOOD BY AUTOMATED COUNT: 13.7 % (ref 11.6–15.1)
GFR SERPL CREATININE-BSD FRML MDRD: 80 ML/MIN/1.73SQ M
GLUCOSE P FAST SERPL-MCNC: 113 MG/DL (ref 65–99)
GLUCOSE SERPL-MCNC: 113 MG/DL (ref 65–140)
GLUCOSE SERPL-MCNC: 115 MG/DL (ref 65–140)
GLUCOSE SERPL-MCNC: 116 MG/DL (ref 65–140)
GLUCOSE SERPL-MCNC: 148 MG/DL (ref 65–140)
GLUCOSE SERPL-MCNC: 155 MG/DL (ref 65–140)
HCT VFR BLD AUTO: 38.3 % (ref 34.8–46.1)
HGB BLD-MCNC: 12.2 G/DL (ref 11.5–15.4)
IMM GRANULOCYTES # BLD AUTO: 0.04 THOUSAND/UL (ref 0–0.2)
IMM GRANULOCYTES NFR BLD AUTO: 1 % (ref 0–2)
LYMPHOCYTES # BLD AUTO: 2.08 THOUSANDS/ΜL (ref 0.6–4.47)
LYMPHOCYTES NFR BLD AUTO: 27 % (ref 14–44)
MAGNESIUM SERPL-MCNC: 1.7 MG/DL (ref 1.6–2.6)
MCH RBC QN AUTO: 30.8 PG (ref 26.8–34.3)
MCHC RBC AUTO-ENTMCNC: 31.9 G/DL (ref 31.4–37.4)
MCV RBC AUTO: 97 FL (ref 82–98)
MONOCYTES # BLD AUTO: 0.65 THOUSAND/ΜL (ref 0.17–1.22)
MONOCYTES NFR BLD AUTO: 8 % (ref 4–12)
NEUTROPHILS # BLD AUTO: 4.87 THOUSANDS/ΜL (ref 1.85–7.62)
NEUTS SEG NFR BLD AUTO: 63 % (ref 43–75)
NRBC BLD AUTO-RTO: 0 /100 WBCS
PLATELET # BLD AUTO: 222 THOUSANDS/UL (ref 149–390)
PMV BLD AUTO: 9.1 FL (ref 8.9–12.7)
POTASSIUM SERPL-SCNC: 3.6 MMOL/L (ref 3.5–5.3)
RBC # BLD AUTO: 3.96 MILLION/UL (ref 3.81–5.12)
SODIUM SERPL-SCNC: 141 MMOL/L (ref 136–145)
TACROLIMUS BLD-MCNC: 5.4 NG/ML (ref 2–20)
WBC # BLD AUTO: 7.73 THOUSAND/UL (ref 4.31–10.16)

## 2020-01-09 PROCEDURE — 99220 PR INITIAL OBSERVATION CARE/DAY 70 MINUTES: CPT | Performed by: PHYSICIAN ASSISTANT

## 2020-01-09 PROCEDURE — 36415 COLL VENOUS BLD VENIPUNCTURE: CPT | Performed by: PHYSICIAN ASSISTANT

## 2020-01-09 PROCEDURE — C1769 GUIDE WIRE: HCPCS | Performed by: UROLOGY

## 2020-01-09 PROCEDURE — 94760 N-INVAS EAR/PLS OXIMETRY 1: CPT

## 2020-01-09 PROCEDURE — 82948 REAGENT STRIP/BLOOD GLUCOSE: CPT

## 2020-01-09 PROCEDURE — 0T768DZ DILATION OF RIGHT URETER WITH INTRALUMINAL DEVICE, VIA NATURAL OR ARTIFICIAL OPENING ENDOSCOPIC: ICD-10-PCS | Performed by: UROLOGY

## 2020-01-09 PROCEDURE — 52332 CYSTOSCOPY AND TREATMENT: CPT | Performed by: UROLOGY

## 2020-01-09 PROCEDURE — 94640 AIRWAY INHALATION TREATMENT: CPT

## 2020-01-09 PROCEDURE — 87086 URINE CULTURE/COLONY COUNT: CPT | Performed by: UROLOGY

## 2020-01-09 PROCEDURE — 99222 1ST HOSP IP/OBS MODERATE 55: CPT | Performed by: UROLOGY

## 2020-01-09 PROCEDURE — 80048 BASIC METABOLIC PNL TOTAL CA: CPT | Performed by: PHYSICIAN ASSISTANT

## 2020-01-09 PROCEDURE — 85025 COMPLETE CBC W/AUTO DIFF WBC: CPT | Performed by: PHYSICIAN ASSISTANT

## 2020-01-09 PROCEDURE — C2617 STENT, NON-COR, TEM W/O DEL: HCPCS | Performed by: UROLOGY

## 2020-01-09 PROCEDURE — 74420 UROGRAPHY RTRGR +-KUB: CPT

## 2020-01-09 PROCEDURE — 83735 ASSAY OF MAGNESIUM: CPT | Performed by: PHYSICIAN ASSISTANT

## 2020-01-09 PROCEDURE — 96375 TX/PRO/DX INJ NEW DRUG ADDON: CPT

## 2020-01-09 PROCEDURE — BT1D1ZZ FLUOROSCOPY OF RIGHT KIDNEY, URETER AND BLADDER USING LOW OSMOLAR CONTRAST: ICD-10-PCS | Performed by: UROLOGY

## 2020-01-09 PROCEDURE — 0T968ZX DRAINAGE OF RIGHT URETER, VIA NATURAL OR ARTIFICIAL OPENING ENDOSCOPIC, DIAGNOSTIC: ICD-10-PCS | Performed by: UROLOGY

## 2020-01-09 PROCEDURE — 0T9B80Z DRAINAGE OF BLADDER WITH DRAINAGE DEVICE, VIA NATURAL OR ARTIFICIAL OPENING ENDOSCOPIC: ICD-10-PCS | Performed by: UROLOGY

## 2020-01-09 DEVICE — STENT URETERAL 6FR 24CML INLAY OPTIMA: Type: IMPLANTABLE DEVICE | Site: URETER | Status: FUNCTIONAL

## 2020-01-09 RX ORDER — DRONABINOL 2.5 MG/1
5 CAPSULE ORAL
Status: DISCONTINUED | OUTPATIENT
Start: 2020-01-09 | End: 2020-01-11 | Stop reason: HOSPADM

## 2020-01-09 RX ORDER — HYDROMORPHONE HCL/PF 1 MG/ML
0.5 SYRINGE (ML) INJECTION
Status: DISCONTINUED | OUTPATIENT
Start: 2020-01-09 | End: 2020-01-11 | Stop reason: HOSPADM

## 2020-01-09 RX ORDER — PREDNISONE 10 MG/1
10 TABLET ORAL DAILY
Status: DISCONTINUED | OUTPATIENT
Start: 2020-01-09 | End: 2020-01-11 | Stop reason: HOSPADM

## 2020-01-09 RX ORDER — HYDROMORPHONE HCL/PF 1 MG/ML
0.5 SYRINGE (ML) INJECTION
Status: DISCONTINUED | OUTPATIENT
Start: 2020-01-09 | End: 2020-01-09

## 2020-01-09 RX ORDER — SODIUM CHLORIDE 9 MG/ML
150 INJECTION, SOLUTION INTRAVENOUS CONTINUOUS
Status: DISCONTINUED | OUTPATIENT
Start: 2020-01-09 | End: 2020-01-11 | Stop reason: HOSPADM

## 2020-01-09 RX ORDER — GABAPENTIN 100 MG/1
100 CAPSULE ORAL 2 TIMES DAILY PRN
Status: DISCONTINUED | OUTPATIENT
Start: 2020-01-09 | End: 2020-01-11 | Stop reason: HOSPADM

## 2020-01-09 RX ORDER — ATOVAQUONE 750 MG/5ML
750 SUSPENSION ORAL DAILY
Status: DISCONTINUED | OUTPATIENT
Start: 2020-01-09 | End: 2020-01-11 | Stop reason: HOSPADM

## 2020-01-09 RX ORDER — OXYBUTYNIN CHLORIDE 5 MG/1
5 TABLET, EXTENDED RELEASE ORAL DAILY
Status: DISCONTINUED | OUTPATIENT
Start: 2020-01-09 | End: 2020-01-10

## 2020-01-09 RX ORDER — BISACODYL 10 MG
10 SUPPOSITORY, RECTAL RECTAL DAILY PRN
Status: DISCONTINUED | OUTPATIENT
Start: 2020-01-09 | End: 2020-01-11 | Stop reason: HOSPADM

## 2020-01-09 RX ORDER — ATROPA BELLADONNA AND OPIUM 16.2; 3 MG/1; MG/1
SUPPOSITORY RECTAL AS NEEDED
Status: DISCONTINUED | OUTPATIENT
Start: 2020-01-09 | End: 2020-01-09 | Stop reason: HOSPADM

## 2020-01-09 RX ORDER — ALPRAZOLAM 0.5 MG/1
1 TABLET ORAL 2 TIMES DAILY PRN
Status: DISCONTINUED | OUTPATIENT
Start: 2020-01-09 | End: 2020-01-11 | Stop reason: HOSPADM

## 2020-01-09 RX ORDER — EPHEDRINE SULFATE 50 MG/ML
INJECTION INTRAVENOUS AS NEEDED
Status: DISCONTINUED | OUTPATIENT
Start: 2020-01-09 | End: 2020-01-09 | Stop reason: SURG

## 2020-01-09 RX ORDER — METOCLOPRAMIDE HYDROCHLORIDE 5 MG/ML
INJECTION INTRAMUSCULAR; INTRAVENOUS AS NEEDED
Status: DISCONTINUED | OUTPATIENT
Start: 2020-01-09 | End: 2020-01-09 | Stop reason: SURG

## 2020-01-09 RX ORDER — PROMETHAZINE HYDROCHLORIDE 25 MG/ML
12.5 INJECTION, SOLUTION INTRAMUSCULAR; INTRAVENOUS ONCE
Status: COMPLETED | OUTPATIENT
Start: 2020-01-09 | End: 2020-01-10

## 2020-01-09 RX ORDER — MAGNESIUM HYDROXIDE 1200 MG/15ML
LIQUID ORAL AS NEEDED
Status: DISCONTINUED | OUTPATIENT
Start: 2020-01-09 | End: 2020-01-09 | Stop reason: HOSPADM

## 2020-01-09 RX ORDER — CALCIUM CARBONATE 200(500)MG
1000 TABLET,CHEWABLE ORAL DAILY PRN
Status: DISCONTINUED | OUTPATIENT
Start: 2020-01-09 | End: 2020-01-11 | Stop reason: HOSPADM

## 2020-01-09 RX ORDER — MIDAZOLAM HYDROCHLORIDE 2 MG/2ML
INJECTION, SOLUTION INTRAMUSCULAR; INTRAVENOUS AS NEEDED
Status: DISCONTINUED | OUTPATIENT
Start: 2020-01-09 | End: 2020-01-09 | Stop reason: SURG

## 2020-01-09 RX ORDER — HYDROMORPHONE HCL/PF 1 MG/ML
1 SYRINGE (ML) INJECTION
Status: DISCONTINUED | OUTPATIENT
Start: 2020-01-09 | End: 2020-01-11 | Stop reason: HOSPADM

## 2020-01-09 RX ORDER — ONDANSETRON 2 MG/ML
4 INJECTION INTRAMUSCULAR; INTRAVENOUS EVERY 6 HOURS PRN
Status: DISCONTINUED | OUTPATIENT
Start: 2020-01-09 | End: 2020-01-10

## 2020-01-09 RX ORDER — ALBUTEROL SULFATE 1.25 MG/3ML
1 SOLUTION RESPIRATORY (INHALATION) EVERY 4 HOURS PRN
Status: DISCONTINUED | OUTPATIENT
Start: 2020-01-09 | End: 2020-01-09 | Stop reason: RX

## 2020-01-09 RX ORDER — ONDANSETRON 2 MG/ML
4 INJECTION INTRAMUSCULAR; INTRAVENOUS ONCE
Status: COMPLETED | OUTPATIENT
Start: 2020-01-09 | End: 2020-01-09

## 2020-01-09 RX ORDER — ACETAMINOPHEN 325 MG/1
650 TABLET ORAL EVERY 6 HOURS PRN
Status: DISCONTINUED | OUTPATIENT
Start: 2020-01-09 | End: 2020-01-11 | Stop reason: HOSPADM

## 2020-01-09 RX ORDER — ONDANSETRON 2 MG/ML
4 INJECTION INTRAMUSCULAR; INTRAVENOUS ONCE AS NEEDED
Status: DISCONTINUED | OUTPATIENT
Start: 2020-01-09 | End: 2020-01-09 | Stop reason: HOSPADM

## 2020-01-09 RX ORDER — PANTOPRAZOLE SODIUM 40 MG/1
40 TABLET, DELAYED RELEASE ORAL
Status: DISCONTINUED | OUTPATIENT
Start: 2020-01-09 | End: 2020-01-10

## 2020-01-09 RX ORDER — FENTANYL CITRATE/PF 50 MCG/ML
50 SYRINGE (ML) INJECTION
Status: DISCONTINUED | OUTPATIENT
Start: 2020-01-09 | End: 2020-01-09 | Stop reason: HOSPADM

## 2020-01-09 RX ORDER — SUCCINYLCHOLINE/SOD CL,ISO/PF 100 MG/5ML
SYRINGE (ML) INTRAVENOUS AS NEEDED
Status: DISCONTINUED | OUTPATIENT
Start: 2020-01-09 | End: 2020-01-09 | Stop reason: SURG

## 2020-01-09 RX ORDER — LEVOFLOXACIN 5 MG/ML
500 INJECTION, SOLUTION INTRAVENOUS ONCE
Status: COMPLETED | OUTPATIENT
Start: 2020-01-09 | End: 2020-01-09

## 2020-01-09 RX ORDER — FERROUS SULFATE 325(65) MG
325 TABLET ORAL 2 TIMES DAILY WITH MEALS
Status: DISCONTINUED | OUTPATIENT
Start: 2020-01-09 | End: 2020-01-11 | Stop reason: HOSPADM

## 2020-01-09 RX ORDER — KETOROLAC TROMETHAMINE 30 MG/ML
15 INJECTION, SOLUTION INTRAMUSCULAR; INTRAVENOUS EVERY 6 HOURS PRN
Status: DISPENSED | OUTPATIENT
Start: 2020-01-09 | End: 2020-01-11

## 2020-01-09 RX ORDER — LIDOCAINE HYDROCHLORIDE 20 MG/ML
JELLY TOPICAL AS NEEDED
Status: DISCONTINUED | OUTPATIENT
Start: 2020-01-09 | End: 2020-01-09 | Stop reason: HOSPADM

## 2020-01-09 RX ORDER — TACROLIMUS 0.5 MG/1
2 CAPSULE ORAL ONCE
Status: DISCONTINUED | OUTPATIENT
Start: 2020-01-09 | End: 2020-01-11 | Stop reason: HOSPADM

## 2020-01-09 RX ORDER — TACROLIMUS 0.5 MG/1
2 CAPSULE ORAL EVERY 12 HOURS SCHEDULED
Status: DISCONTINUED | OUTPATIENT
Start: 2020-01-09 | End: 2020-01-11 | Stop reason: HOSPADM

## 2020-01-09 RX ORDER — LIDOCAINE HYDROCHLORIDE 20 MG/ML
INJECTION, SOLUTION EPIDURAL; INFILTRATION; INTRACAUDAL; PERINEURAL AS NEEDED
Status: DISCONTINUED | OUTPATIENT
Start: 2020-01-09 | End: 2020-01-09 | Stop reason: SURG

## 2020-01-09 RX ORDER — ALBUTEROL SULFATE 2.5 MG/3ML
2.5 SOLUTION RESPIRATORY (INHALATION) EVERY 8 HOURS PRN
Status: DISCONTINUED | OUTPATIENT
Start: 2020-01-09 | End: 2020-01-11 | Stop reason: HOSPADM

## 2020-01-09 RX ORDER — PROPOFOL 10 MG/ML
INJECTION, EMULSION INTRAVENOUS AS NEEDED
Status: DISCONTINUED | OUTPATIENT
Start: 2020-01-09 | End: 2020-01-09 | Stop reason: SURG

## 2020-01-09 RX ORDER — ONDANSETRON 2 MG/ML
INJECTION INTRAMUSCULAR; INTRAVENOUS AS NEEDED
Status: DISCONTINUED | OUTPATIENT
Start: 2020-01-09 | End: 2020-01-09 | Stop reason: SURG

## 2020-01-09 RX ORDER — METOCLOPRAMIDE HYDROCHLORIDE 5 MG/ML
10 INJECTION INTRAMUSCULAR; INTRAVENOUS EVERY 6 HOURS PRN
Status: DISCONTINUED | OUTPATIENT
Start: 2020-01-09 | End: 2020-01-10

## 2020-01-09 RX ORDER — SODIUM CHLORIDE, SODIUM LACTATE, POTASSIUM CHLORIDE, CALCIUM CHLORIDE 600; 310; 30; 20 MG/100ML; MG/100ML; MG/100ML; MG/100ML
INJECTION, SOLUTION INTRAVENOUS CONTINUOUS PRN
Status: DISCONTINUED | OUTPATIENT
Start: 2020-01-09 | End: 2020-01-09 | Stop reason: SURG

## 2020-01-09 RX ORDER — VALGANCICLOVIR 450 MG/1
900 TABLET, FILM COATED ORAL
Status: DISCONTINUED | OUTPATIENT
Start: 2020-01-09 | End: 2020-01-11 | Stop reason: HOSPADM

## 2020-01-09 RX ADMIN — EPHEDRINE SULFATE 10 MG: 50 INJECTION, SOLUTION INTRAVENOUS at 13:56

## 2020-01-09 RX ADMIN — PREDNISONE 10 MG: 10 TABLET ORAL at 08:36

## 2020-01-09 RX ADMIN — ACETAMINOPHEN 650 MG: 325 TABLET, FILM COATED ORAL at 18:06

## 2020-01-09 RX ADMIN — COLISTIMETHATE SODIUM 150 MG: 150 INJECTION, POWDER, LYOPHILIZED, FOR SOLUTION INTRAMUSCULAR; INTRAVENOUS at 20:30

## 2020-01-09 RX ADMIN — PANCRELIPASE 24000 UNITS: 24000; 76000; 120000 CAPSULE, DELAYED RELEASE PELLETS ORAL at 07:43

## 2020-01-09 RX ADMIN — MIDAZOLAM HYDROCHLORIDE 2 MG: 1 INJECTION, SOLUTION INTRAMUSCULAR; INTRAVENOUS at 13:42

## 2020-01-09 RX ADMIN — LEVOFLOXACIN: 5 INJECTION, SOLUTION INTRAVENOUS at 13:39

## 2020-01-09 RX ADMIN — FENTANYL CITRATE 50 MCG: 50 INJECTION, SOLUTION INTRAMUSCULAR; INTRAVENOUS at 14:34

## 2020-01-09 RX ADMIN — PANTOPRAZOLE SODIUM 40 MG: 40 TABLET, DELAYED RELEASE ORAL at 06:05

## 2020-01-09 RX ADMIN — METOCLOPRAMIDE 10 MG: 5 INJECTION, SOLUTION INTRAMUSCULAR; INTRAVENOUS at 13:29

## 2020-01-09 RX ADMIN — HYDROMORPHONE HYDROCHLORIDE 1 MG: 1 INJECTION, SOLUTION INTRAMUSCULAR; INTRAVENOUS; SUBCUTANEOUS at 11:43

## 2020-01-09 RX ADMIN — SODIUM CHLORIDE, SODIUM LACTATE, POTASSIUM CHLORIDE, AND CALCIUM CHLORIDE: .6; .31; .03; .02 INJECTION, SOLUTION INTRAVENOUS at 13:37

## 2020-01-09 RX ADMIN — SODIUM CHLORIDE 50 ML/HR: 0.9 INJECTION, SOLUTION INTRAVENOUS at 03:40

## 2020-01-09 RX ADMIN — ALPRAZOLAM 1 MG: 0.5 TABLET ORAL at 11:47

## 2020-01-09 RX ADMIN — SODIUM CHLORIDE 150 ML/HR: 0.9 INJECTION, SOLUTION INTRAVENOUS at 21:38

## 2020-01-09 RX ADMIN — HYDROMORPHONE HYDROCHLORIDE 1 MG: 1 INJECTION, SOLUTION INTRAMUSCULAR; INTRAVENOUS; SUBCUTANEOUS at 21:29

## 2020-01-09 RX ADMIN — KETOROLAC TROMETHAMINE 15 MG: 30 INJECTION, SOLUTION INTRAMUSCULAR at 09:45

## 2020-01-09 RX ADMIN — MAGNESIUM GLUCONATE 500 MG ORAL TABLET 400 MG: 500 TABLET ORAL at 08:36

## 2020-01-09 RX ADMIN — HYDROMORPHONE HYDROCHLORIDE 1 MG: 1 INJECTION, SOLUTION INTRAMUSCULAR; INTRAVENOUS; SUBCUTANEOUS at 00:28

## 2020-01-09 RX ADMIN — Medication 100 MG: at 13:48

## 2020-01-09 RX ADMIN — HYDROMORPHONE HYDROCHLORIDE 1 MG: 1 INJECTION, SOLUTION INTRAMUSCULAR; INTRAVENOUS; SUBCUTANEOUS at 15:50

## 2020-01-09 RX ADMIN — DRONABINOL 5 MG: 2.5 CAPSULE ORAL at 07:42

## 2020-01-09 RX ADMIN — ONDANSETRON 4 MG: 2 INJECTION INTRAMUSCULAR; INTRAVENOUS at 09:45

## 2020-01-09 RX ADMIN — SODIUM CHLORIDE 150 ML/HR: 0.9 INJECTION, SOLUTION INTRAVENOUS at 15:52

## 2020-01-09 RX ADMIN — PHENYLEPHRINE HYDROCHLORIDE 100 MCG: 10 INJECTION INTRAVENOUS at 13:55

## 2020-01-09 RX ADMIN — DRONABINOL 5 MG: 2.5 CAPSULE ORAL at 11:45

## 2020-01-09 RX ADMIN — HYDROMORPHONE HYDROCHLORIDE 0.5 MG: 1 INJECTION, SOLUTION INTRAMUSCULAR; INTRAVENOUS; SUBCUTANEOUS at 03:19

## 2020-01-09 RX ADMIN — FERROUS SULFATE TAB 325 MG (65 MG ELEMENTAL FE) 325 MG: 325 (65 FE) TAB at 07:43

## 2020-01-09 RX ADMIN — COLISTIMETHATE SODIUM 150 MG: 150 INJECTION, POWDER, LYOPHILIZED, FOR SOLUTION INTRAMUSCULAR; INTRAVENOUS at 11:05

## 2020-01-09 RX ADMIN — PROPOFOL 200 MG: 10 INJECTION, EMULSION INTRAVENOUS at 13:48

## 2020-01-09 RX ADMIN — HYDROMORPHONE HYDROCHLORIDE 0.5 MG: 1 INJECTION, SOLUTION INTRAMUSCULAR; INTRAVENOUS; SUBCUTANEOUS at 17:39

## 2020-01-09 RX ADMIN — ONDANSETRON 4 MG: 2 INJECTION INTRAMUSCULAR; INTRAVENOUS at 15:51

## 2020-01-09 RX ADMIN — PANCRELIPASE 48000 UNITS: 24000; 76000; 120000 CAPSULE, DELAYED RELEASE PELLETS ORAL at 16:22

## 2020-01-09 RX ADMIN — PHENYLEPHRINE HYDROCHLORIDE 100 MCG: 10 INJECTION INTRAVENOUS at 13:51

## 2020-01-09 RX ADMIN — TACROLIMUS 2 MG: 1 CAPSULE ORAL at 08:36

## 2020-01-09 RX ADMIN — METOCLOPRAMIDE 10 MG: 5 INJECTION, SOLUTION INTRAMUSCULAR; INTRAVENOUS at 19:50

## 2020-01-09 RX ADMIN — ONDANSETRON 4 MG: 2 INJECTION INTRAMUSCULAR; INTRAVENOUS at 13:48

## 2020-01-09 RX ADMIN — ONDANSETRON 4 MG: 2 INJECTION INTRAMUSCULAR; INTRAVENOUS at 22:17

## 2020-01-09 RX ADMIN — HYDROMORPHONE HYDROCHLORIDE 1 MG: 1 INJECTION, SOLUTION INTRAMUSCULAR; INTRAVENOUS; SUBCUTANEOUS at 07:39

## 2020-01-09 RX ADMIN — FENTANYL CITRATE 50 MCG: 50 INJECTION, SOLUTION INTRAMUSCULAR; INTRAVENOUS at 14:51

## 2020-01-09 RX ADMIN — LIDOCAINE HYDROCHLORIDE 100 MG: 20 INJECTION, SOLUTION EPIDURAL; INFILTRATION; INTRACAUDAL; PERINEURAL at 13:48

## 2020-01-09 RX ADMIN — VALGANCICLOVIR 900 MG: 450 TABLET, FILM COATED ORAL at 07:42

## 2020-01-09 NOTE — H&P
H&P- Alex Turciosharrisnismaryvineet 1975, 45 y o  female MRN: [id number]    Unit/Bed#: ED 16 Encounter: [id number]    Primary Care Provider: Jacy Hernandez MD   Date and time admitted to hospital: 1/8/2020  8:12 PM        * Ureterolithiasis  Assessment & Plan  · Reports acute onset Rt mid back pain at 1730 that has since migrated down to Rt groin  · CT abd/pelvis w/ revealed "Mild right hydronephrosis secondary to 8 mm calculus at the right UPJ "  · UA (-) for infection  · ED provider spoke with transplant team at [place] [address] (Dr Chan Lin)  States need to be transferred to [place] [address] for any procedures  No beds available currently  · NPO except sips of clears  · Pain control  · Urology consult  · CM consult to help facilitate transfer    History of lung transplant 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  · Transplant team - Dr Chan Lin at [place] [address]  · Continue home dose Colistimethate neb BID  · Continue home dose Tacrolimus, Valganciclovir and Prednisone  · Pt to be transported to [place] [address] for any procedures per transplant team    Cystic fibrosis ([facility] [address] )  Assessment & Plan  · Hx of lung transplant as above  · Continue home dose Creon TID [facility]    Hyperglycemia  Assessment & Plan  · Likely 2/2 chronic Prednisone use  · FSBS AC & HS w/ SSI    VTE Prophylaxis: Low risk  Ambulatory  / reason for no mechanical VTE prophylaxis Ambulatory   Code Status: Level 1 Full Code  POLST: POLST form is not discussed and not completed at this time  Discussion with family: NA    Anticipated Length of Stay:  Patient will be admitted on an Observation basis with an anticipated length of stay of  Less than 2 midnights  Justification for Hospital Stay: See AP above    Total Time for Visit, including Counseling / Coordination of Care: 45 minutes  Greater than 50% of this total time spent on direct patient counseling and coordination of care      Chief Complaint:   Rt flank pain    History of Present Illness:    René Souza is a 45 y o  female who presents with Rt flank pain  Reports acute onset Rt mid back pain at 1730 on day of admission that has now migrated around to Rt groin  Denies fever/chills or urinary symptoms  (+) N/V  CT scan completed in ED revealed 8mm obstructive stone at UPJ with mild hydronephrosis  Has hs of lung transplant  Last in 2017  ED provider spoke with transplant team (Dr Italia Rivera) who states that she would need to be transfered to [facility] for any procedures  Currently there ar no beds available at [facility]  Review of Systems:    Review of Systems   Constitutional: Negative for activity change, chills and fever  HENT: Negative for congestion, ear pain, sinus pressure and sore throat  Eyes: Negative for visual disturbance  Respiratory: Negative for cough, shortness of breath and wheezing  Cardiovascular: Negative for chest pain, palpitations and leg swelling  Gastrointestinal: Positive for abdominal pain, nausea and vomiting  Negative for constipation and diarrhea  Genitourinary: Positive for flank pain  Negative for difficulty urinating, dysuria, frequency and urgency  Musculoskeletal: Negative for neck pain and neck stiffness  Neurological: Negative for dizziness, syncope and headaches  All other systems reviewed and are negative  Past Medical and Surgical History:     Past Medical History:   Diagnosis Date    Asthma     Chronic pain     Cystic fibrosis ([facility] [address] )     Transplant recipient        Past Surgical History:   Procedure Laterality Date    LUNG SURGERY         Meds/Allergies:    Prior to Admission medications    Medication Sig Start Date End Date Taking?  Authorizing Provider   albuterol (ACCUNEB) 1 25 MG/3ML nebulizer solution Take 1 ampule by nebulization every 4 (four) hours as needed for wheezing    Historical Provider, MD   alendronate (FOSAMAX) 70 mg tablet Take 70 mg by mouth every 7 days    Historical Provider, MD   ALPRAZolam David Ludwig) 1 mg tablet Take by mouth 2 (two) times a day as needed for anxiety    Historical Provider, MD   atovaquone (MEPRON) 750 mg/5 mL suspension Take 750 mg by mouth daily    Historical Provider, MD   colistimethate (COLY-MYCIN M) inhalation solution Take 150 mg by nebulization every 12 (twelve) hours    Historical Provider, MD   dexlansoprazole (DEXILANT) 60 MG capsule Take 60 mg by mouth daily    Historical Provider, MD   dronabinol (MARINOL) 5 MG capsule Take 5 mg by mouth 3 (three) times a day before meals    Historical Provider, MD   ferrous sulfate 325 (65 Fe) mg tablet Take 325 mg by mouth 2 (two) times a day with meals    Historical Provider, MD   gabapentin (NEURONTIN) 100 mg capsule Take 100 mg by mouth 2 (two) times a day as needed    Historical Provider, MD   magnesium oxide (MAG-OX) 400 mg Take 400 mg by mouth 2 (two) times a day    Historical Provider, MD   multivitamin (THERAGRAN) TABS Take 1 tablet by mouth daily    Historical Provider, MD   pancrelipase, Lip-Prot-Amyl, (CREON) 24,000 units Take 24,000 units of lipase by mouth 3 (three) times a day with meals    Historical Provider, MD   predniSONE 10 mg tablet Take by mouth daily    Historical Provider, MD   tacrolimus (PROGRAF) 1 mg capsule Take 2 mg by mouth every 12 (twelve) hours    Historical Provider, MD   valGANciclovir (VALCYTE) 450 mg tablet Take 900 mg by mouth daily with breakfast    Historical Provider, MD     I have reviewed home medications with patient personally  Allergies:    Allergies   Allergen Reactions    Vancomycin Angioedema and Hives    Ceftazidime Headache     Severe headaches after desensitization       Social History:     Marital Status: /Civil Union   Patient Pre-hospital Living Situation: Lives with   Patient Pre-hospital Level of Mobility: Ambulatory w/o assistive device  Patient Pre-hospital Diet Restrictions: None  Substance Use History:   Social History     Substance and Sexual Activity   Alcohol Use Yes  Frequency: 2-4 times a month    Comment: social     Social History     Tobacco Use   Smoking Status Never Smoker   Smokeless Tobacco Never Used     Social History     Substance and Sexual Activity   Drug Use Never       Family History:    Family History   Family history unknown: Yes       Physical Exam:     Vitals:   Blood Pressure: 154/98 (01/09/20 0032)  Pulse: 86 (01/09/20 0032)  Temperature: 99 5 °F (37 5 °C) (01/08/20 2034)  Temp Source: Oral (01/08/20 2034)  Respirations: 20 (01/08/20 2318)  Weight - Scale: 50 9 kg (112 lb 3 4 oz) (01/08/20 2034)  SpO2: 92 % (01/09/20 0032)    Physical Exam   Constitutional: She is oriented to person, place, and time  She appears well-developed and well-nourished  No distress  HENT:   Head: Normocephalic and atraumatic  Eyes: Pupils are equal, round, and reactive to light  EOM are normal    Neck: Normal range of motion  Neck supple  Cardiovascular: Normal rate, regular rhythm, normal heart sounds and intact distal pulses  Exam reveals no gallop and no friction rub  No murmur heard  Pulmonary/Chest: Effort normal and breath sounds normal  No respiratory distress  She has no wheezes  She has no rales  Abdominal: Soft  Bowel sounds are normal  There is tenderness  There is no rebound and no guarding  Musculoskeletal: Normal range of motion  She exhibits no edema or tenderness  Neurological: She is alert and oriented to person, place, and time  Skin: Skin is warm and dry  Psychiatric: She has a normal mood and affect  Her behavior is normal  Thought content normal        Additional Data:     Lab Results: I have personally reviewed pertinent reports        Results from last 7 days   Lab Units 01/08/20 2052   WBC Thousand/uL 8 50   HEMOGLOBIN g/dL 12 7   HEMATOCRIT % 39 0   PLATELETS Thousands/uL 247   NEUTROS PCT % 75   LYMPHS PCT % 18   MONOS PCT % 6   EOS PCT % 0     Results from last 7 days   Lab Units 01/08/20 2052   SODIUM mmol/L 138   POTASSIUM mmol/L 3  8   CHLORIDE mmol/L 104   CO2 mmol/L 24   BUN mg/dL 17   CREATININE mg/dL 0 86   ANION GAP mmol/L 10   CALCIUM mg/dL 8 6   ALBUMIN g/dL 3 7   TOTAL BILIRUBIN mg/dL 0 30   ALK PHOS U/L 94   ALT U/L 29   AST U/L 15   GLUCOSE RANDOM mg/dL 196*                 Results from last 7 days   Lab Units 01/08/20  2052   LACTIC ACID mmol/L 1 6       Imaging: I have personally reviewed pertinent reports  CT abdomen pelvis with contrast   Final Result by Adam Austin MD (01/08 2301)      1  Mild right hydronephrosis secondary to 8 mm calculus at the right UPJ       2   Nonobstructing bilateral renal calculi  3   Suggestion of mild wall thickening of the ascending colon, hepatic flexure and transverse colon probably related to underdistention, however correlate for colitis  4   Grossly stable bibasilar nodular densities with tree in bud appearance and bronchiectasis  The study was marked in [facility] for immediate notification  Workstation performed: [id number]             EKG, Pathology, and Other Studies Reviewed on Admission:   · EKG: NA    Allscripts / Epic Records Reviewed: Yes     ** Please Note: This note has been constructed using a voice recognition system   **

## 2020-01-09 NOTE — ANESTHESIA PREPROCEDURE EVALUATION
Review of Systems/Medical History  Patient summary reviewed  Chart reviewed  No history of anesthetic complications     Cardiovascular  EKG reviewed, Exercise tolerance (METS): >4,     Pulmonary  Asthma , PRN med  controlled Last rescue: < 1 month ago ,        GI/Hepatic       Kidney stones,        Endo/Other     GYN       Hematology    Immunocompromised state ,    Musculoskeletal       Neurology   Psychology   Anxiety,              Physical Exam    Airway    Mallampati score: I  TM Distance: >3 FB  Neck ROM: full     Dental   No notable dental hx     Cardiovascular      Pulmonary      Other Findings        Anesthesia Plan  ASA Score- 3 Emergent    Anesthesia Type- general with ASA Monitors  Additional Monitors:   Airway Plan: ETT  Comment: Pt has h/o bilateral lung transplant with subsequent failure  F/u L lung tx with currently functioning lung  Due to active N/V unsafe to undergo anesthesia w/o protected airway  Plan for RSI with ERA  Pt's transplant pulmonologist contacted who concurs with anesthesia plan        Plan Factors-    Induction- intravenous and rapid sequence induction  Postoperative Plan- Plan for postoperative opioid use  Informed Consent- Anesthetic plan and risks discussed with patient  I personally reviewed this patient with the CRNA  Discussed and agreed on the Anesthesia Plan with the CRNA  Stan Mcclure

## 2020-01-09 NOTE — ANESTHESIA POSTPROCEDURE EVALUATION
Post-Op Assessment Note    CV Status:  Stable  Pain Score: 0    Pain management: adequate     Mental Status:  Sleepy   Hydration Status:  Stable   PONV Controlled:  None   Airway Patency:  Patent and adequate   Post Op Vitals Reviewed: Yes      Staff: CRNA           BP   107/57   Temp   97 3   Pulse  69   Resp   14   SpO2   100%

## 2020-01-09 NOTE — ED NOTES
This nurse spoke with Ling Berg from [facility], patient information given for future transfer    Per Ling Berg, patient has MDRO's as well as CF     Roscoe Wesley, RN  01/09/20 9673

## 2020-01-09 NOTE — UTILIZATION REVIEW
Initial Clinical Review  OBSERVATION 1/9/20 @ 0053 CONVERTED TO INPATIENT 1/9/20 @1553 DUE TO INTRACTABLE N/V AFTER MULTIPLE DOSES OF ZOFRAN AND REGLAN     01/09/20 1553  Inpatient Admission Once     Transfer Service: General Medicine       Question Answer Comment   Admitting Physician [facility]    Level of Care Med Surg    Estimated length of stay More than 2 Midnights    Certification I certify that inpatient services are medically necessary for this patient for a duration of greater than two midnights  See H&P and MD Progress Notes for additional information about the patient's course of treatment  kidney stone       01/09/20 1553       ED Arrival Information     Expected Arrival Acuity Means of Arrival Escorted By Service Admission Type    - 1/8/2020 20:12 Urgent Ambulance [address]  [facility] Hospitalist Urgent    Arrival Complaint    ABDOMINAL PAIN        Chief Complaint   Patient presents with    Abdominal Pain     Patient having right lower pelvis and back pain  Patient is tender to touch as well with movement  Patient has CF with hx of lung transplant  Patient has had appendectomy and cholecystecomy  History of infrarenal kidney stones  Assessment/Plan: 45year old female to the ED from home via EMS with complaints of right lower quadrant abdomial pain and righ flank pain for 2 hours prior to her arrival   H/Ocystic fibrosis and  bilateral lung transplant x 2 on tacrolimus and prednisone  She has nausea, vomiting CVA tenderness and right lower quad tenderness  Admitted under observation then converted to inpatient for ureterolithiasis with mild hydronephrosis  * Ureterolithiasis  Assessment & Plan  · Reports acute onset Rt mid back pain at 1730 that has since migrated down to Rt groin  · CT abd/pelvis w/ revealed "Mild right hydronephrosis secondary to 8 mm calculus at the right UPJ "  · UA (-) for infection  · ED provider spoke with transplant team at [place] [address] (Dr Brenda Carlson)   States need to be transferred to [facility] for any procedures  No beds available currently  · NPO except sips of clears  · Pain control  · Urology consult  · CM consult to help facilitate transfer  History of lung transplant [facility])  Assessment & Plan  · Hx of Lt lung transplant 2013 and 2017  Rt lung failure  · Transplant team - Dr Amber Nicole at [facility]  · Continue home dose Colistimethate neb BID  · Continue home dose Tacrolimus, Valganciclovir and Prednisone  · Pt to be transported to [facility] for any procedures per transplant team  Cystic fibrosis ([facility] [address] )  Assessment & Plan  · Hx of lung transplant as above  · Continue home dose Creon TID AC     VTE Prophylaxis: Low risk  Ambulatory    UPDATE 1/9:  Having intractable vomiting despite zofran and reglan  Complaining of increase in right groin pain  S/P ureteral stent placement and awaiting transfrer to [facility] fr further procedure due to h/o lung transplant  Rescan abdomen, check stat CBC, BMP, LA and procalcitonin, blood cultures x 2, given Phenergan IV and start IV abx  OPERATIVE REPORT 1/9:   SURGERY DATE: 1/9/2020   Procedure(s) (LRB):  CYSTOSCOPY RETROGRADE PYELOGRAM WITH INSERTION STENT URETERAL (Right)   Operative Findings:  1  Radioopaque RIGHT renal stone  2  Hydronephrosis  3  Stent placed in good position    Urology consult 1/9 post op:  Stent colic to be treated with IV flulids, antispasmodic  Further definitive stone management to be determined by Martina Dotson  She continues with suprapubic pain and urinary urgency  GI consult 1/10: A CT scan of the abdomen was performed last night due to the persistent pain and it did show evidence of terminal ileitis as well as cecal and ascending colon thickening  Symptoms likely related to righ hydronephrosis and nephrolithiasis  Abdomen is soft and nondistended     ED Triage Vitals [01/08/20 2034]   Temperature Pulse Respirations Blood Pressure SpO2   99 5 °F (37 5 °C) 94 18 (!) 171/101 94 %      Temp Source Heart Rate Source Patient Position - Orthostatic VS BP Location FiO2 (%)   Oral Monitor Sitting Right arm --      Pain Score       8        Wt Readings from Last 1 Encounters:   01/08/20 50 9 kg (112 lb 3 4 oz)     Additional Vital Signs:   Date/Time  Temp  Pulse  Resp  BP  MAP (mmHg)  SpO2  O2 Device  Patient Position - Orthostatic VS   01/09/20 0800    85  16  137/90    90 %  None (Room air)  Lying   01/09/20 0542  98 3 °F (36 8 °C)  79  16  145/97    93 %  None (Room air)  Lying   01/09/20 0330    85  18  164/96    91 %  None (Room air)  Lying   01/09/20 0032    86    154/98  122  92 %       01/08/20 2318    80  20  116/71             Pertinent Labs/Diagnostic Test Results:   CT A/P1/8/20;   Mild right hydronephrosis secondary to 8 mm calculus at the right UPJ  Nonobstructing bilateral renal calculi  Suggestion of mild wall thickening of the ascending colon, hepatic flexure and transverse colon probably related to underdistention, however correlate for colitis  Grossly stable bibasilar nodular densities with tree in bud appearance and bronchiectasis  CT A/P1/10: Terminal ileal enteritis and proximal ascending colon and cecal colitis        Right ureteral double-J stent has been placed in the interval   Previously seen obstructing stone in the right UPJ no longer visualized  Previously seen right hydronephrosis has essentially resolved  2 mm nonobstructing stone is seen in the   interpolar right kidney  12 mm nonobstructing stones in the left kidney  No hydronephrosis      Stable bibasilar ill-defined opacities and tiny nodular densities with tree-in-bud appearance and bronchiectasis  Surgical clip in the medial left lung base    Results from last 7 days   Lab Units 01/09/20  0508 01/08/20 2052   WBC Thousand/uL 7 73 8 50   HEMOGLOBIN g/dL 12 2 12 7   HEMATOCRIT % 38 3 39 0   PLATELETS Thousands/uL 222 247   NEUTROS ABS Thousands/µL 4 87 6 39         Results from last 7 days   Lab Units 01/09/20  0508 01/08/20 2052 SODIUM mmol/L 141 138   POTASSIUM mmol/L 3 6 3 8   CHLORIDE mmol/L 106 104   CO2 mmol/L 28 24   ANION GAP mmol/L 7 10   BUN mg/dL 14 17   CREATININE mg/dL 0 89 0 86   EGFR ml/min/1 73sq m 80 84   CALCIUM mg/dL 8 3 8 6   MAGNESIUM mg/dL 1 7  --      Results from last 7 days   Lab Units 01/08/20 2052   AST U/L 15   ALT U/L 29   ALK PHOS U/L 94   TOTAL PROTEIN g/dL 7 8   ALBUMIN g/dL 3 7   TOTAL BILIRUBIN mg/dL 0 30     Results from last 7 days   Lab Units 01/09/20  0541 01/09/20  0359   POC GLUCOSE mg/dl 116 115     Results from last 7 days   Lab Units 01/09/20  0508 01/08/20 2052   GLUCOSE RANDOM mg/dL 113 196*     Results from last 7 days   Lab Units 01/08/20 2052   LACTIC ACID mmol/L 1 6     Results from last 7 days   Lab Units 01/08/20 2052   LIPASE u/L 21*       Results from last 7 days   Lab Units 01/08/20 2127   CLARITY UA  Slightly Cloudy   COLOR UA  Yellow   SPEC GRAV UA  1 025   PH UA  5 0   GLUCOSE UA mg/dl 100 (1/10%)*   KETONES UA mg/dl Negative   BLOOD UA  Large*   PROTEIN UA mg/dl 30 (1+)*   NITRITE UA  Negative   BILIRUBIN UA  Negative   UROBILINOGEN UA E U /dl 0 2   LEUKOCYTES UA  Negative   WBC UA /hpf None Seen   RBC UA /hpf 20-30*   BACTERIA UA /hpf None Seen   EPITHELIAL CELLS WET PREP /hpf None Seen     ED Treatment:   Medication Administration from 01/08/2020 2012 to 01/09/2020 0850       Date/Time Order Dose Route Action     01/08/2020 2101 ketorolac (TORADOL) injection 15 mg 15 mg Intravenous Given     01/08/2020 2057 ondansetron (ZOFRAN) injection 4 mg 4 mg Intravenous Given     01/08/2020 2112 metoclopramide (REGLAN) injection 10 mg 10 mg Intravenous Given     01/08/2020 2112 diphenhydrAMINE (BENADRYL) injection 25 mg 25 mg Intravenous Given     01/09/2020 0028 HYDROmorphone (DILAUDID) injection 1 mg 1 mg Intravenous Given     01/09/2020 0605 pantoprazole (PROTONIX) EC tablet 40 mg 40 mg Oral Given     01/09/2020 0742 dronabinol (MARINOL) capsule 5 mg 5 mg Oral Given     01/09/2020 7246 ferrous sulfate tablet 325 mg 325 mg Oral Given     01/09/2020 0836 magnesium oxide (MAG-OX) tablet 400 mg 400 mg Oral Given     01/09/2020 0743 pancrelipase (Lip-Prot-Amyl) (CREON) delayed release capsule 24,000 Units 24,000 Units Oral Given     01/09/2020 0836 predniSONE tablet 10 mg 10 mg Oral Given     01/09/2020 0836 tacrolimus (PROGRAF) capsule 2 mg 2 mg Oral Given     01/09/2020 0742 valGANciclovir (VALCYTE) tablet 900 mg 900 mg Oral Given     01/09/2020 0340 sodium chloride 0 9 % infusion 50 mL/hr Intravenous New Bag     01/09/2020 0319 HYDROmorphone (DILAUDID) injection 0 5 mg 0 5 mg Intravenous Given     01/09/2020 0739 HYDROmorphone (DILAUDID) injection 1 mg 1 mg Intravenous Given        Past Medical History:   Diagnosis Date    Asthma     Chronic pain     Cystic fibrosis ([facility] [address] )     Transplant recipient      Admitting Diagnosis: Abdominal pain [R10 9]  Age/Sex: 45 y o  female  Admission Orders:  Clear liquid diet  Scheduled Medications:    Medications:  colistimethate (COLY-MYCIN M) inhalation solution 150 mg Nebulization Q12H   dronabinol 5 mg Oral TID AC   ferrous sulfate 325 mg Oral BID With Meals   insulin lispro 1-5 Units Subcutaneous Q6H DOTTY   magnesium oxide 400 mg Oral BID   pancrelipase (Lip-Prot-Amyl) 48,000 Units Oral TID With Meals   pantoprazole 40 mg Oral Early Morning   predniSONE 10 mg Oral Daily   tacrolimus 2 mg Oral Q12H [facility]   valGANciclovir 900 mg Oral Daily With Breakfast     Continuous IV Infusions:    sodium chloride 150 mL/hr Intravenous Continuous     PRN Meds:    acetaminophen 650 mg Oral Q6H PRN   ALPRAZolam 1 mg Oral BID PRN   bisacodyl 10 mg Rectal Daily PRN   calcium carbonate 1,000 mg Oral Daily PRN   gabapentin 100 mg Oral BID PRN   HYDROmorphone 0 5 mg Intravenous Q3H PRN   HYDROmorphone 1 mg Intravenous Q3H PRN   ketorolac 15 mg Intravenous Q6H PRN   ondansetron 4 mg Intravenous Q6H PRN       IP CONSULT TO UROLOGY  IP CONSULT TO CASE MANAGEMENT    Network Utilization Review Department  [email or web address]  ATTENTION: Please call with any questions or concerns to [phone] and carefully listen to the prompts so that you are directed to the right person  All voicemails are confidential   Dieudonne Miller all requests for admission clinical reviews, approved or denied determinations and any other requests to dedicated fax number below belonging to the campus where the patient is receiving treatment   List of dedicated fax numbers for the Facilities:  [address] [place] [address] DENIALS (Administrative/Medical Necessity) [phone]   [address] [place] [address] (Maternity/NICU/Pediatrics) [phone]   Abhijit Stanley [phone]   Soniya Promise [phone]   [facility] [phone]   Flor Lucero [phone]   [address] [place] [address] [phone]   [facility]  [phone]   [address] [facility]  [address] [facility] [address] [facility] [phone]

## 2020-01-09 NOTE — ASSESSMENT & PLAN NOTE
· Hx of Lt lung transplant 2013 and 2017   Rt lung failure  · Transplant team - Dr HERMES HEREDIA [facility]  · Continue home dose Colistimethate neb BID  · Continue home dose Tacrolimus, Valganciclovir and Prednisone  · Pt to be transported to [facility] for any procedures per transplant team

## 2020-01-09 NOTE — ED PROVIDER NOTES
History  Chief Complaint   Patient presents with    Abdominal Pain     Patient having right lower pelvis and back pain  Patient is tender to touch as well with movement  Patient has CF with hx of lung transplant  Patient has had appendectomy and cholecystecomy  History of infrarenal kidney stones  44 y/o female, hx of colitis, and cystic fibrosis s/p bilateral lung transplant in 2013 with transplant failure bilaterally and re-transplant of L lung in 2017 currently on tacrolumis and prednisone, presents to the ED for right flank and RLQ abd pain x 2 hours  States that she has had sharp/ stabbing pain in her right flank that radiates to the RLQ  She states that it waxes and wanes in severity  Denies any aggravating or alleviating factors  She states that she has also had nausea multiple episodes of vomiting  Denies any fevers/chills, chest pain, shortness of breath, urinary symptoms, or diarrhea/constipation  She states that she does have a history of kidney stones in the past but has never passed 1 before  She denies any prior procedures for kidney stones  States that she has not taken anything for the pain  No other complaints  History provided by:  Patient  Abdominal Pain   Pain location:  RLQ  Pain quality: sharp and stabbing    Pain radiates to:  R flank  Pain severity:  Moderate  Onset quality:  Sudden  Duration:  2 hours  Timing:  Constant  Progression:  Worsening  Chronicity:  New  Context: previous surgery    Context: not sick contacts    Relieved by:  None tried  Worsened by:  Nothing  Ineffective treatments:  None tried  Associated symptoms: nausea and vomiting    Associated symptoms: no chest pain, no chills, no cough, no diarrhea, no dysuria, no fever, no hematuria, no shortness of breath and no sore throat        Prior to Admission Medications   Prescriptions Last Dose Informant Patient Reported? Taking?    ALPRAZolam (XANAX) 1 mg tablet   Yes No   Sig: Take by mouth 2 (two) times a day as needed for anxiety   albuterol (ACCUNEB) 1 25 MG/3ML nebulizer solution   Yes No   Sig: Take 1 ampule by nebulization every 4 (four) hours as needed for wheezing   alendronate (FOSAMAX) 70 mg tablet   Yes No   Sig: Take 70 mg by mouth every 7 days   atovaquone (MEPRON) 750 mg/5 mL suspension   Yes No   Sig: Take 750 mg by mouth daily   colistimethate (COLY-MYCIN M) inhalation solution   Yes No   Sig: Take 150 mg by nebulization every 12 (twelve) hours   dexlansoprazole (DEXILANT) 60 MG capsule   Yes No   Sig: Take 60 mg by mouth daily   dronabinol (MARINOL) 5 MG capsule   Yes No   Sig: Take 5 mg by mouth 3 (three) times a day before meals   ferrous sulfate 325 (65 Fe) mg tablet   Yes No   Sig: Take 325 mg by mouth 2 (two) times a day with meals   gabapentin (NEURONTIN) 100 mg capsule   Yes No   Sig: Take 100 mg by mouth 2 (two) times a day as needed   magnesium oxide (MAG-OX) 400 mg   Yes No   Sig: Take 400 mg by mouth 2 (two) times a day   multivitamin (THERAGRAN) TABS   Yes No   Sig: Take 1 tablet by mouth daily   pancrelipase, Lip-Prot-Amyl, (CREON) 24,000 units   Yes No   Sig: Take 24,000 units of lipase by mouth 3 (three) times a day with meals   predniSONE 10 mg tablet   Yes No   Sig: Take by mouth daily   tacrolimus (PROGRAF) 1 mg capsule   Yes No   Sig: Take 2 mg by mouth every 12 (twelve) hours   valGANciclovir (VALCYTE) 450 mg tablet   Yes No   Sig: Take 900 mg by mouth daily with breakfast      Facility-Administered Medications: None       Past Medical History:   Diagnosis Date    Asthma     Chronic pain     Cystic fibrosis ([facility] [address] )     Transplant recipient        Past Surgical History:   Procedure Laterality Date    LUNG SURGERY         Family History   Family history unknown: Yes     I have reviewed and agree with the history as documented  Social History     Tobacco Use    Smoking status: Never Smoker    Smokeless tobacco: Never Used   Substance Use Topics    Alcohol use:  Yes Frequency: 2-4 times a month     Comment: social    Drug use: Never        Review of Systems   Constitutional: Negative for chills and fever  HENT: Negative for congestion, ear pain and sore throat  Eyes: Negative for pain and visual disturbance  Respiratory: Negative for cough, shortness of breath and wheezing  Cardiovascular: Negative for chest pain and leg swelling  Gastrointestinal: Positive for abdominal pain, nausea and vomiting  Negative for diarrhea  Genitourinary: Negative for dysuria, frequency, hematuria and urgency  Musculoskeletal: Negative for neck pain and neck stiffness  Skin: Negative for rash and wound  Neurological: Negative for weakness, numbness and headaches  Psychiatric/Behavioral: Negative for agitation and confusion  All other systems reviewed and are negative  Physical Exam  Physical Exam   Constitutional: She is oriented to person, place, and time  She appears well-developed and well-nourished  HENT:   Head: Normocephalic and atraumatic  Eyes: Pupils are equal, round, and reactive to light  EOM are normal    Neck: Normal range of motion  Neck supple  Cardiovascular: Normal rate and regular rhythm  Pulmonary/Chest: Effort normal and breath sounds normal    Abdominal: Soft  Bowel sounds are normal  There is tenderness in the right lower quadrant  There is CVA tenderness  There is no rigidity, no rebound and no guarding  Musculoskeletal: Normal range of motion  Neurological: She is alert and oriented to person, place, and time  No focal deficits   Skin: Skin is warm and dry  Nursing note and vitals reviewed        Vital Signs  ED Triage Vitals [01/08/20 2034]   Temperature Pulse Respirations Blood Pressure SpO2   99 5 °F (37 5 °C) 94 18 (!) 171/101 94 %      Temp Source Heart Rate Source Patient Position - Orthostatic VS BP Location FiO2 (%)   Oral Monitor Sitting Right arm --      Pain Score       8           Vitals:    01/08/20 2034 01/08/20 2194 01/09/20 0032 01/09/20 0330   BP: (!) 171/101 116/71 154/98 164/96   Pulse: 94 80 86 85   Patient Position - Orthostatic VS: Sitting Lying  Lying         Visual Acuity  Visual Acuity      Most Recent Value   L Pupil Size (mm)  4   R Pupil Size (mm)  4   L Pupil Shape  Round   R Pupil Shape  Round          ED Medications  Medications   ALPRAZolam (XANAX) tablet 1 mg (has no administration in time range)   Colistimethate Sodium (CBA) (COLY-MYCIN M) inhalation solution 150 mg (has no administration in time range)   pantoprazole (PROTONIX) EC tablet 40 mg (has no administration in time range)   dronabinol (MARINOL) capsule 5 mg (has no administration in time range)   ferrous sulfate tablet 325 mg (has no administration in time range)   gabapentin (NEURONTIN) capsule 100 mg (has no administration in time range)   magnesium oxide (MAG-OX) tablet 400 mg (has no administration in time range)   pancrelipase (Lip-Prot-Amyl) (CREON) delayed release capsule 24,000 Units (has no administration in time range)   predniSONE tablet 10 mg (has no administration in time range)   tacrolimus (PROGRAF) capsule 2 mg (0 mg Oral Hold 1/9/20 0329)   valGANciclovir (VALCYTE) tablet 900 mg (has no administration in time range)   sodium chloride 0 9 % infusion (50 mL/hr Intravenous New Bag 1/9/20 0340)   bisacodyl (DULCOLAX) rectal suppository 10 mg (has no administration in time range)   ondansetron (ZOFRAN) injection 4 mg (has no administration in time range)   calcium carbonate (TUMS) chewable tablet 1,000 mg (has no administration in time range)   acetaminophen (TYLENOL) tablet 650 mg (has no administration in time range)   HYDROmorphone (DILAUDID) injection 0 5 mg (0 5 mg Intravenous Given 1/9/20 0319)   HYDROmorphone (DILAUDID) injection 1 mg (has no administration in time range)   insulin lispro (HumaLOG) 100 units/mL subcutaneous injection 1-5 Units (has no administration in time range)   ketorolac (TORADOL) injection 15 mg (15 mg Intravenous Given 1/8/20 2101)   ondansetron (ZOFRAN) injection 4 mg (4 mg Intravenous Given 1/8/20 2057)   metoclopramide (REGLAN) injection 10 mg (10 mg Intravenous Given 1/8/20 2112)   diphenhydrAMINE (BENADRYL) injection 25 mg (25 mg Intravenous Given 1/8/20 2112)   iohexol (OMNIPAQUE) 350 MG/ML injection (MULTI-DOSE) 100 mL (100 mL Intravenous Given 1/8/20 2145)   HYDROmorphone (DILAUDID) injection 1 mg (1 mg Intravenous Given 1/9/20 0028)       Diagnostic Studies  Results Reviewed     Procedure Component Value Units Date/Time    Fingerstick Glucose (POCT) [[id number]]  (Normal) Collected:  01/09/20 0359    Lab Status:  Final result Updated:  01/09/20 0404     POC Glucose 115 mg/dl     Basic metabolic panel [[id number]]     Lab Status:  No result Specimen:  Blood     Magnesium [[id number]]     Lab Status:  No result Specimen:  Blood     CBC and differential [[id number]]     Lab Status:  No result Specimen:  Blood     Tacrolimus level [[id number]]  (Normal) Collected:  01/08/20 2052    Lab Status:  Final result Specimen:  Blood from Central Venous Line Updated:  01/09/20 0314     TACROLIMUS 5 4 ng/mL     Urine Microscopic [[id number]]  (Abnormal) Collected:  01/08/20 2127    Lab Status:  Final result Specimen:  Urine, Clean Catch Updated:  01/08/20 2201     RBC, UA 20-30 /hpf      WBC, UA None Seen /hpf      Epithelial Cells None Seen /hpf      Bacteria, UA None Seen /hpf     UA w Reflex to Microscopic w Reflex to Culture [[id number]]  (Abnormal) Collected:  01/08/20 2127    Lab Status:  Final result Specimen:  Urine, Clean Catch Updated:  01/08/20 2140     Color, UA Yellow     Clarity, UA Slightly Cloudy     Specific Gravity, UA 1 025     pH, UA 5 0     Leukocytes, UA Negative     Nitrite, UA Negative     Protein, UA 30 (1+) mg/dl      Glucose,  (1/10%) mg/dl      Ketones, UA Negative mg/dl      Urobilinogen, UA 0 2 E U /dl      Bilirubin, UA Negative     Blood, UA Large    Lipase [[id number]]  (Abnormal) Collected:  01/08/20 2052    Lab Status:  Final result Specimen:  Blood from Central Venous Line Updated:  01/08/20 2121     Lipase 21 u/L     hCG, qualitative pregnancy [[id number]]  (Normal) Collected:  01/08/20 2052    Lab Status:  Final result Specimen:  Blood from Central Venous Line Updated:  01/08/20 2121     Preg, Serum Negative    Lactic acid, plasma [[id number]]  (Normal) Collected:  01/08/20 2052    Lab Status:  Final result Specimen:  Blood from Central Venous Line Updated:  01/08/20 2118     LACTIC ACID 1 6 mmol/L     Narrative:       Result may be elevated if tourniquet was used during collection      CMP [[id number]]  (Abnormal) Collected:  01/08/20 2052    Lab Status:  Final result Specimen:  Blood from Central Venous Line Updated:  01/08/20 2116     Sodium 138 mmol/L      Potassium 3 8 mmol/L      Chloride 104 mmol/L      CO2 24 mmol/L      ANION GAP 10 mmol/L      BUN 17 mg/dL      Creatinine 0 86 mg/dL      Glucose 196 mg/dL      Calcium 8 6 mg/dL      AST 15 U/L      ALT 29 U/L      Alkaline Phosphatase 94 U/L      Total Protein 7 8 g/dL      Albumin 3 7 g/dL      Total Bilirubin 0 30 mg/dL      eGFR 84 ml/min/1 73sq m     Narrative:       [place] guidelines for Chronic Kidney Disease (CKD):     Stage 1 with normal or high GFR (GFR > 90 mL/min/1 73 square meters)    Stage 2 Mild CKD (GFR = 60-89 mL/min/1 73 square meters)    Stage 3A Moderate CKD (GFR = 45-59 mL/min/1 73 square meters)    Stage 3B Moderate CKD (GFR = 30-44 mL/min/1 73 square meters)    Stage 4 Severe CKD (GFR = 15-29 mL/min/1 73 square meters)    Stage 5 End Stage CKD (GFR <15 mL/min/1 73 square meters)  Note: GFR calculation is accurate only with a steady state creatinine    CBC and differential [[id number]] Collected:  01/08/20 2052    Lab Status:  Final result Specimen:  Blood from Central Venous Line Updated:  01/08/20 2103     WBC 8 50 Thousand/uL      RBC 4 09 Million/uL      Hemoglobin 12 7 g/dL Hematocrit 39 0 %      MCV 95 fL      MCH 31 1 pg      MCHC 32 6 g/dL      RDW 13 6 %      MPV 8 9 fL      Platelets 690 Thousands/uL      nRBC 0 /100 WBCs      Neutrophils Relative 75 %      Immat GRANS % 1 %      Lymphocytes Relative 18 %      Monocytes Relative 6 %      Eosinophils Relative 0 %      Basophils Relative 0 %      Neutrophils Absolute 6 39 Thousands/µL      Immature Grans Absolute 0 04 Thousand/uL      Lymphocytes Absolute 1 52 Thousands/µL      Monocytes Absolute 0 51 Thousand/µL      Eosinophils Absolute 0 02 Thousand/µL      Basophils Absolute 0 02 Thousands/µL     POCT pregnancy, urine [[id number]]     Lab Status:  No result                  CT abdomen pelvis with contrast   Final Result by Kely Leung MD (01/08 2301)      1  Mild right hydronephrosis secondary to 8 mm calculus at the right UPJ       2   Nonobstructing bilateral renal calculi  3   Suggestion of mild wall thickening of the ascending colon, hepatic flexure and transverse colon probably related to underdistention, however correlate for colitis  4   Grossly stable bibasilar nodular densities with tree in bud appearance and bronchiectasis  The study was marked in [facility] for immediate notification  Workstation performed: [id number]                    Procedures  Procedures         ED Course  ED Course as of Jan 09 0422   Wed Jan 08, 2020   2330 Paged transplant team at [facility]- Dr Mell Ledezma is her lung specialist  [phone] Spoke with [facility] NP- recommends transferring patient to [facility] due to complexity of patient and potential need for surgery on an urgent basis  Would like any procedures/ anesthesia to be done at their facility   Will transfer patient to [facility] Jan 09, 2020   0007 Spoke with PACs- will work on transfer       [phone] with transfer center at [facility]- spoke with Dr Onur Edwards, transplant doctor on call, states that there are currently no beds available at their facility  Will work on getting a bed available in the mean time- recommends admission here until there is a bed available for transfer  MDM  Number of Diagnoses or Management Options  Obstruction of right ureteropelvic junction (UPJ) due to stone: new and requires workup  Diagnosis management comments: Patient with right flank and right lower quadrant abdominal pain  Will get labs, UA, and CT abdomen/pelvis to rule out intra-abdominal pathology  Will give Toradol, Zofran, and fluids  Will reassess for dispo  ED Course as of Jan 09 0053  Wed Jan 08, 2020  2330 Paged transplant team at [facility]- Dr Gene Fuentes is her lung specialist  [phone] Spoke with [facility] NP- recommends transferring patient to [facility] due to complexity of patient and potential need for surgery on an urgent basis  Would like any procedures/ anesthesia to be done at their facility  Will transfer patient to [facility] Jan 09, 2020  0007 Spoke with PACs- will work on transfer      [phone] with transfer center at [facility]- spoke with Dr Elie Gee, transplant doctor on call, states that there are currently no beds available at their facility  Will work on getting a bed available in the mean time- recommends admission here until there is a bed available for transfer  Patient reevaluated and feels improved  Patient updated on results of tests and plan of care including admission to hospital for further evaluation of presenting complaint  Patient demonstrates verbal understanding and agrees with plan  Report to [facility]  with [facility]  for continuation of patient care            Amount and/or Complexity of Data Reviewed  Clinical lab tests: ordered and reviewed  Tests in the radiology section of CPT®: ordered and reviewed  Tests in the medicine section of CPT®: ordered and reviewed  Discussion of test results with the performing providers: yes  Decide to obtain previous medical records or to obtain history from someone other than the patient: yes  Obtain history from someone other than the patient: yes  Review and summarize past medical records: yes  Discuss the patient with other providers: yes  Independent visualization of images, tracings, or specimens: yes    Patient Progress  Patient progress: improved        Disposition  Final diagnoses:   Obstruction of right ureteropelvic junction (UPJ) due to stone     Time reflects when diagnosis was documented in both MDM as applicable and the Disposition within this note     Time User Action Codes Description Comment    1/9/2020 12:53 AM Alec Renteria Add [N20 1] Obstruction of right ureteropelvic junction (UPJ) due to stone     1/9/2020  1:58 AM Anibal Ashley Add [N20 1] Ureterolithiasis       ED Disposition     ED Disposition Condition Date/Time Comment    Admit Stable Thu Jan 9, 2020 12:53 AM Case was discussed with KORY and the patient's admission status was agreed to be Admission Status: observation status to the service of Dr Jen Shen   Follow-up Information    None         Patient's Medications   Discharge Prescriptions    No medications on file     No discharge procedures on file      ED Provider  Electronically Signed by           Clemente Olivas DO  01/09/20 0422

## 2020-01-09 NOTE — OP NOTE
OPERATIVE REPORT  PATIENT NAME: Moy Fortune    :  1975  MRN: [id number]  Pt Location: [facility] OR ROOM 04    SURGERY DATE: 2020    Surgeon(s) and Role:     * Monica Lepe MD - Primary    Preop Diagnosis:  Obstruction of right ureteropelvic junction (UPJ) due to stone [N20 1]  History of lung transplant ([address] ) [Z94 2]    Post-Op Diagnosis Codes:     * Obstruction of right ureteropelvic junction (UPJ) due to stone [N20 1]     * History of lung transplant ([address] ) [Z94 2]    Procedure(s) (LRB):  CYSTOSCOPY RETROGRADE PYELOGRAM WITH INSERTION STENT URETERAL (Right)    Specimen(s):  ID Type Source Tests Collected by Time Destination   A : Right renal pelvis urine culture Urine Urine, Renal, Right URINE CULTURE Monica Lepe MD 2020 1403        Estimated Blood Loss:   Minimal    Drains:  NG/OG/Enteral Tube Orogastric 18 Fr Center mouth (Active)   Number of days: 0       Urethral Catheter 18 Fr  (Active)   Number of days: 0       Ureteral Drain/Stent Right ureter 6 Fr  (Active)   Number of days: 0       [REMOVED] NG/OG/Enteral Tube (Removed)   Number of days: 0       Anesthesia Type:   General    Operative Indications:  Obstruction of right ureteropelvic junction (UPJ) due to stone [N20 1]  History of lung transplant ([address] ) [Z94 2]      Operative Findings:  1  Radioopaque RIGHT renal stone  2  Hydronephrosis  3  Stent placed in good position    Complications:   None    Procedure and Technique:  Merlin Perch is a 45y o -year-old female with a known history of stone disease  Unfortunate, the patient has undergone bilateral lung transplant with revision  She presented with a right proximal stone with signs of obstruction and significant flank pain  At the request of her transplant team, transfer to her transplant Hospital was requested  Due to bed availability, this transfer was delayed  After discussion with the transplant surgeon, they agreed to allow us to proceed    Patient was counseled regarding her options and opted to consent to a cystoscopy with right retrograde pyelogram and stent placement  The patient understands that I will not remove their stone at this time because of her status and the requested for transplant team to avoid any major operative intervention without the involved and that they will require interval ureteroscopy in the future, likely at the [address]  We discussed that ureteral stent cannot be consider permanent and will need to be removed or exchanged within 3-6 months  Informed consent was obtained  The patient was brought to the operating room on 1/9/2020  After the smooth induction of general LMA anesthesia, the patient was placed in the dorsal lithotomy position  Her genitalia was prepped and draped in a sterile fashion  Venodyne boots had been applied  Intravenous antibiotics were administered in the form of Levaquin  A timeout was performed with all members of the operative team confirming the patient's identity, procedure to be performed, and laterality of the case  A 24 Bhutanese rigid cystoscope with 30° lens was inserted  The bladder was thoroughly inspected  Attention was focused on the right ureteral orifice   fluoroscopy demonstrated a radioopaque stone  A Bard Solo Plus Wire was placed into the collecting system and passed proximal to the stone and clear-appearing urine came from the right ureteral orifice  I then advanced the 5 [place] open-ended catheter over the wire and into the right renal pelvis  The wire was removed and there was mild a hydronephrotic drip  This was collected and sent for culture  A gentle retrograde pyelogram was performed confirming hydronephrosis  There were no other filling defects identified  A wire was reinserted and placed into the upper pole calyx  A 6 Bhutanese 24 double-J ureteral stent was then placed in the standard fashion    The proximal coil was appreciated in the right renal pelvis and the distal coil was visualized within the bladder  There was no string left in place  2% viscous lidocaine was placed per urethra  [de-identified] Montenegrin Kim catheter placed into the bladder  Overall the patient tolerated the procedure well and there were no complications  The patient was extubated in the operating room and transferred to the PACU in stable condition at the conclusion of the case  Plan-patient will maintain her Kim catheter and stent  She will be transferred [facility] the [facility]  Urology consult should be obtained there to determine timing of ureteroscopy       I was present for the entire procedure    Patient Disposition:  PACU     SIGNATURE: Christina Garcia MD  DATE: January 9, 2020  TIME: 2:28 PM

## 2020-01-09 NOTE — SOCIAL WORK
CM met with pt at bedside and explained role  Pt reports she lives with her spouse and child in a 2 story house; [address], 12 steps inside  Pt reports she is completely independent with ADLs  She does not use an assistive device to ambulate  pt deneis the use of any medical equipment  Pt PCP is Dr Amy Dolan  She uses [facility] for medications and denies any barriers to obtaining them  Pt with history of cystic fibrosis s/p bilateral lung transplant in 2013 with transplant failure bilaterally and re-transplant of L lung in 2017  Transfer to [facility] initiated by ED physician due to complexity of patient given transplant history and potential need for surgery on an urgent basis  Pt has medicare A&B as primary insurance  She will not require auth for transfer or transport  CM will continue to follow as needed

## 2020-01-09 NOTE — DISCHARGE INSTRUCTIONS
You have a stent in place  Your stone was not addressed and you will absolutely need to return to your transplant center to arrange interval stone surgery, likely with urology there  Failure to followup can result in dangerous complication as the ureteral stent cannot remain in place indefinitely  Ureteroscopy   WHAT YOU NEED TO KNOW:   A ureteroscopy is a procedure to examine in the inside of your urinary tract, which includes your urethra, bladder, ureters, and kidneys  A ureteroscope is a small, thin tube with a light and camera on the end  Ureteroscopy can help your healthcare provider diagnose and treat problems in your urinary tract, such as kidney stones  DISCHARGE INSTRUCTIONS:   Medicine:   · Antibiotics  may be given to treat or prevent an infection  · Take your medicine as directed  Contact your healthcare provider if you think your medicine is not helping or if you have side effects  Tell him or her if you are allergic to any medicine  Keep a list of the medicines, vitamins, and herbs you take  Include the amounts, and when and why you take them  Bring the list or the pill bottles to follow-up visits  Carry your medicine list with you in case of an emergency  Follow up with your healthcare provider as directed:  Write down your questions so you remember to ask them during your visits  Drink liquids as directed  Liquids can help prevent kidney stones and urinary tract infections  Drink water and limit the amount of caffeine you drink  Caffeine may be found in coffee, tea, soda, sports drinks, and foods  Ask your healthcare provider how much liquid to drink each day  Contact your healthcare provider if:   · You have a fever  · You cannot urinate  · You have blood in your urine  · You are vomiting  · You have pain in your abdomen or side  · You have questions or concerns about your condition or care    © 2017 [employer] Information is for End User's use only and may not be sold, redistributed or otherwise used for commercial purposes  All illustrations and images included in CareNotes® are the copyrighted property of A D A M , Inc  or [employer]  The above information is an  only  It is not intended as medical advice for individual conditions or treatments  Talk to your doctor, nurse or pharmacist before following any medical regimen to see if it is safe and effective for you

## 2020-01-09 NOTE — PLAN OF CARE
Problem: PAIN - ADULT  Goal: Verbalizes/displays adequate comfort level or baseline comfort level  Description  Interventions:  - Encourage patient to monitor pain and request assistance  - Assess pain using appropriate pain scale  - Administer analgesics based on type and severity of pain and evaluate response  - Implement non-pharmacological measures as appropriate and evaluate response  - Consider cultural and social influences on pain and pain management  - Notify physician/advanced practitioner if interventions unsuccessful or patient reports new pain  Outcome: Progressing     Problem: INFECTION - ADULT  Goal: Absence or prevention of progression during hospitalization  Description  INTERVENTIONS:  - Assess and monitor for signs and symptoms of infection  - Monitor lab/diagnostic results  - Monitor all insertion sites, i e  indwelling lines, tubes, and drains  - Monitor endotracheal if appropriate and nasal secretions for changes in amount and color  - [place] appropriate cooling/warming therapies per order  - Administer medications as ordered  - Instruct and encourage patient and family to use good hand hygiene technique  - Identify and instruct in appropriate isolation precautions for identified infection/condition  Outcome: Progressing  Goal: Absence of fever/infection during neutropenic period  Description  INTERVENTIONS:  - Monitor WBC    Outcome: Progressing     Problem: SAFETY ADULT  Goal: Patient will remain free of falls  Description  INTERVENTIONS:  - Assess patient frequently for physical needs  -  Identify cognitive and physical deficits and behaviors that affect risk of falls    -  [place] fall precautions as indicated by assessment   - Educate patient/family on patient safety including physical limitations  - Instruct patient to call for assistance with activity based on assessment  - Modify environment to reduce risk of injury  - Consider OT/PT consult to assist with strengthening/mobility  Outcome: Progressing  Goal: Maintain or return to baseline ADL function  Description  INTERVENTIONS:  -  Assess patient's ability to carry out ADLs; assess patient's baseline for ADL function and identify physical deficits which impact ability to perform ADLs (bathing, care of mouth/teeth, toileting, grooming, dressing, etc )  - Assess/evaluate cause of self-care deficits   - Assess range of motion  - Assess patient's mobility; develop plan if impaired  - Assess patient's need for assistive devices and provide as appropriate  - Encourage maximum independence but intervene and supervise when necessary  - Involve family in performance of ADLs  - Assess for home care needs following discharge   - Consider OT consult to assist with ADL evaluation and planning for discharge  - Provide patient education as appropriate  Outcome: Progressing  Goal: Maintain or return mobility status to optimal level  Description  INTERVENTIONS:  - Assess patient's baseline mobility status (ambulation, transfers, stairs, etc )    - Identify cognitive and physical deficits and behaviors that affect mobility  - Identify mobility aids required to assist with transfers and/or ambulation (gait belt, sit-to-stand, lift, walker, cane, etc )  - [place] fall precautions as indicated by assessment  - Record patient progress and toleration of activity level on Mobility SBAR; progress patient to next Phase/Stage  - Instruct patient to call for assistance with activity based on assessment  - Consider rehabilitation consult to assist with strengthening/weightbearing, etc   Outcome: Progressing     Problem: DISCHARGE PLANNING  Goal: Discharge to home or other facility with appropriate resources  Description  INTERVENTIONS:  - Identify barriers to discharge w/patient and caregiver  - Arrange for needed discharge resources and transportation as appropriate  - Identify discharge learning needs (meds, wound care, etc )  - Arrange for interpretive services to assist at discharge as needed  - Refer to Case Management Department for coordinating discharge planning if the patient needs post-hospital services based on physician/advanced practitioner order or complex needs related to functional status, cognitive ability, or social support system  Outcome: Progressing     Problem: Knowledge Deficit  Goal: Patient/family/caregiver demonstrates understanding of disease process, treatment plan, medications, and discharge instructions  Description  Complete learning assessment and assess knowledge base    Interventions:  - Provide teaching at level of understanding  - Provide teaching via preferred learning methods  Outcome: Progressing

## 2020-01-09 NOTE — CONSULTS
CONSULT    Patient Name: Daisha Long  Patient MRN: [id number]  Date of Service: 1/9/2020   Date / Time Note Created: 1/9/2020 10:29 AM   Referring Provider: Murray Velásquez MD  Provider Creating Note: WAYNE Barillas    PCP: Sammy Hardy  Attending Provider:  Murray Velásquez MD    Reason for Consult: Flank Pain    HPI:  Daisha Long is a female with history of cystic fibrosis prior lung transplant x2 and on chronic immunosuppression; admitted for onset of right flank pain since yesterday, not accompanied by fever, chills, dysuria or gross hematuria  She is afebrile and hemodynamically stable  Urinalysis does not suggest UTI  Lab work was not review leukocytosis or acute kidney injury  However, patient offers persistent pain, nausea and dry heaving  CT of the abdomen and pelvis was obtained finding suggestive of mild right hydronephrosis secondary to 8 mm calculus at the right UPJ  There other nonobstructing bilateral renal calculi  Additional findings include mild intestinal wall thickening at ascending colon and hepatic flexure, ? Colitis  She denies any prior history of recurrent UTI, nephrolithiasis or prior  surgical instrumentation  Of note, patient had recent colonoscopy under sedation performed  Urologic consultation is requested for possible surgical intervention  Active Problems:    Patient Active Problem List   Diagnosis    Ureterolithiasis    [facility] [address] [facility]    [facility]    Hyperglycemia            Impressions  Right Ureteral Calculus--8 mm right UPJ  Right Hydronephrosis  Renal Colic  ? Colitis    Recommendations  1  Initiate aggressive IVFs   2  Flomax  3  Analgesia/Narcotics   4  Anti-emetics   5  ATBs   6  Strain urine   7  NPO  Patient is awaiting transfer to [address] [place] [address] where pulmonary transplant team can carefully manage    However, considering that patient remains symptomatic, may consider stent pain placement under sedation  Patient will require thorough anesthesia eval   In the interim, recommend conservative management with aggressive pain control  Will defer definitive surgical plan to both attending and anesthesiology              Past Medical History:   Diagnosis Date    Asthma     Chronic pain     [facility] fibrosis [facility] [facility]     Transplant recipient        Past Surgical History:   Procedure Laterality Date    LUNG SURGERY         Family History   Family history unknown: Yes       Social History     Socioeconomic History    Marital status: /Civil Union     Spouse name: Not on file    Number of children: Not on file    Years of education: Not on file    Highest education level: Not on file   Occupational History    Not on file   Social Needs    Financial resource strain: Not on file    Food insecurity:     Worry: Not on file     Inability: Not on file    Transportation needs:     Medical: Not on file     Non-medical: Not on file   Tobacco Use    Smoking status: Never Smoker    Smokeless tobacco: Never Used   Substance and Sexual Activity    Alcohol use: Yes     Frequency: 2-4 times a month     Comment: social    Drug use: Never    Sexual activity: Not on file   Lifestyle    Physical activity:     Days per week: Not on file     Minutes per session: Not on file    Stress: Not on file   Relationships    Social connections:     Talks on phone: Not on file     Gets together: Not on file     Attends Mandaen service: Not on file     Active member of club or organization: Not on file     Attends meetings of clubs or organizations: Not on file     Relationship status: Not on file    Intimate partner violence:     Fear of current or ex partner: Not on file     Emotionally abused: Not on file     Physically abused: Not on file     Forced sexual activity: Not on file   Other Topics Concern    Not on file   Social History Narrative    Not on file       Allergies   Allergen Reactions    Vancomycin Angioedema and Hives    Ceftazidime Headache     Severe headaches after desensitization       Review of Systems  10 point review of systems negative except as noted in HPI  Constitutional:   negative for - chills or fever  Cardiovascular:   positive for - shortness of breath--chronic  Gastrointestinal:   positive for - abdominal pain and nausea/vomiting  Genito-Urinary:   no dysuria, trouble voiding, or hematuria  Neurological:   no TIA or stroke symptoms     Chart Review   Allergies, current medications, history, problem list    Vital Signs  /90 (BP Location: Right arm)   Pulse 85   Temp 98 3 °F (36 8 °C) (Oral)   Resp 16   Wt 50 9 kg (112 lb 3 4 oz)   LMP 01/04/2020   SpO2 90%   BMI 20 52 kg/m²     Physical Exam  General appearance: alert and oriented, in no acute distress, appears older than stated age, cooperative, mild distress, pale and Chronically ill-appearing  Head: Normocephalic, without obvious abnormality, atraumatic  Neck: no adenopathy, no carotid bruit, no JVD, supple, symmetrical, trachea midline and thyroid not enlarged, symmetric, no tenderness/mass/nodules  Lungs: diminished breath sounds  Heart: regular rate and rhythm, S1, S2 normal, no murmur, click, rub or gallop  Abdomen: abnormal findings:  moderate tenderness in the RLQ and in the lower abdomen  Extremities: extremities normal, warm and well-perfused; no cyanosis, clubbing, or edema  Pulses: 2+ and symmetric  Neurologic: Grossly normal  No urinary drains     Laboratory Studies  Lab Results   Component Value Date    K 3 6 01/09/2020     01/09/2020    CO2 28 01/09/2020    CREATININE 0 89 01/09/2020    BUN 14 01/09/2020    MG 1 7 01/09/2020    PHOS 2 6 (L) 11/12/2019     Lab Results   Component Value Date    WBC 7 73 01/09/2020    RBC 3 96 01/09/2020    HGB 12 2 01/09/2020    HCT 38 3 01/09/2020    MCV 97 01/09/2020    MCH 30 8 01/09/2020    RDW 13 7 01/09/2020     01/09/2020         Imaging and Other Studies  )  Ct Abdomen Pelvis With Contrast    Result Date: 1/8/2020  Narrative: CT ABDOMEN AND PELVIS WITH IV CONTRAST INDICATION:   Right flank and RLQ abd pain  COMPARISON:  CT chest on 11/12/2019  TECHNIQUE:  CT examination of the abdomen and pelvis was performed  Axial, sagittal, and coronal 2D reformatted images were created from the source data and submitted for interpretation  Radiation dose length product (DLP) for this visit:  473 63 mGy-cm   This examination, like all CT scans performed in the [facility], was performed utilizing techniques to minimize radiation dose exposure, including the use of iterative  reconstruction and automated exposure control  IV Contrast:  100 mL of iohexol (OMNIPAQUE) Enteric Contrast:  Enteric contrast was not administered  FINDINGS: ABDOMEN LOWER CHEST:  Grossly stable bibasilar ill-defined opacities and tiny nodular densities with tree-in-bud appearance and bronchiectasis  Surgical clip in the medial left lung base  Partially imaged median sternotomy wires and bilateral breast implants  LIVER/BILIARY TREE:  Stable 1 2 cm low-density in the anterior aspect of the medial segment left lobe subjacent to the hepatic capsule  GALLBLADDER:  Gallbladder is surgically absent  SPLEEN:  Unremarkable  PANCREAS:  Complete fatty replacement of the pancreas  ADRENAL GLANDS:  Unremarkable  KIDNEYS/URETERS:  8 mm calculus at the right UPJ causing mild hydronephrosis  Nonobstructing 2 mm calculus in the right renal mid pole and nonobstructing 12 mm calculus in the left renal lower pole  STOMACH AND BOWEL:  Limited evaluation without enteric contrast   No bowel obstruction  Suggestion of mild wall thickening of the ascending colon, hepatic flexure and transverse colon  Surgical clips in the region of the base of the cecum  APPENDIX:  No findings to suggest appendicitis  ABDOMINOPELVIC CAVITY:  No ascites or free intraperitoneal air  No lymphadenopathy   VESSELS:  Venous collaterals are present in the left upper quadrant  PELVIS REPRODUCTIVE ORGANS:  Unremarkable for patient's age  URINARY BLADDER:  Unremarkable  ABDOMINAL WALL/INGUINAL REGIONS:  Unremarkable  OSSEOUS STRUCTURES:  No acute fracture or destructive osseous lesion  Impression: 1  Mild right hydronephrosis secondary to 8 mm calculus at the right UPJ  2   Nonobstructing bilateral renal calculi  3   Suggestion of mild wall thickening of the ascending colon, hepatic flexure and transverse colon probably related to underdistention, however correlate for colitis  4   Grossly stable bibasilar nodular densities with tree in bud appearance and bronchiectasis  The study was marked in [facility] for immediate notification   Workstation performed: [id number]       Medications     Current Facility-Administered Medications:  acetaminophen 650 mg Oral Q6H PRN Iris Gearing, PA-C    ALPRAZolam 1 mg Oral BID PRN Iris Gearing, PA-C    bisacodyl 10 mg Rectal Daily PRN Iris Gearing, PA-C    calcium carbonate 1,000 mg Oral Daily PRN Iris Gearing, PA-C    colistimethate (COLY-MYCIN M) inhalation solution 150 mg Nebulization Q12H Iris Gearing, PA-C    dronabinol 5 mg Oral TID AC Iris Gearing, PA-C    ferrous sulfate 325 mg Oral BID With Meals Iris Gearing, PA-C    gabapentin 100 mg Oral BID PRN Iris Gearing, PA-ALFONSO    HYDROmorphone 0 5 mg Intravenous Q3H PRN Jt Da Silva MD    HYDROmorphone 1 mg Intravenous Q3H PRN Iris Gearing, PA-C    insulin lispro 1-5 Units Subcutaneous Q6H [address] Zachairah Merrill PA-C    ketorolac 15 mg Intravenous Q6H PRN Jt Da Silva MD    magnesium oxide 400 mg Oral BID Iris Gearing, PA-C    ondansetron 4 mg Intravenous Q6H PRN Iris Gearing, PA-C    pancrelipase (Lip-Prot-Amyl) 48,000 Units Oral TID With Meals Jt Da Silva MD    pantoprazole 40 mg Oral Early Morning Iris Gearing, PA-C    predniSONE 10 mg Oral Daily Iris Gearing, PA-C    sodium chloride 150 mL/hr Intravenous Continuous Jt Da Silva MD Last Rate: 150 mL/hr (01/09/20 0946)   tacrolimus 2 mg Oral Q12H [facility], JAROD    valGANciclovir 900 mg Oral Daily With Breakfast JAROD Sampson, CRNP

## 2020-01-09 NOTE — ASSESSMENT & PLAN NOTE
· Reports acute onset Rt mid back pain at 1730 that has since migrated down to Rt groin  · CT abd/pelvis w/ revealed "Mild right hydronephrosis secondary to 8 mm calculus at the right UPJ "  · UA (-) for infection  · ED provider spoke with transplant team at [place] [address] (Dr Mathieu Rodriguez)  States need to be transferred to [place] [address] for any procedures   No beds available currently  · NPO except sips of clears  · Pain control  · Urology consult  · CM consult to help facilitate transfer

## 2020-01-10 ENCOUNTER — APPOINTMENT (INPATIENT)
Dept: CT IMAGING | Facility: HOSPITAL | Age: 45
DRG: 660 | End: 2020-01-10
Payer: COMMERCIAL

## 2020-01-10 PROBLEM — R93.89 ABNORMAL CT SCAN: Status: ACTIVE | Noted: 2020-01-10

## 2020-01-10 LAB
ANION GAP SERPL CALCULATED.3IONS-SCNC: 8 MMOL/L (ref 4–13)
BACTERIA UR CULT: NORMAL
BASOPHILS # BLD AUTO: 0.03 THOUSANDS/ΜL (ref 0–0.1)
BASOPHILS NFR BLD AUTO: 0 % (ref 0–1)
BUN SERPL-MCNC: 9 MG/DL (ref 5–25)
CALCIUM SERPL-MCNC: 7.4 MG/DL (ref 8.3–10.1)
CHLORIDE SERPL-SCNC: 105 MMOL/L (ref 100–108)
CO2 SERPL-SCNC: 26 MMOL/L (ref 21–32)
CREAT SERPL-MCNC: 0.72 MG/DL (ref 0.6–1.3)
EOSINOPHIL # BLD AUTO: 0.05 THOUSAND/ΜL (ref 0–0.61)
EOSINOPHIL NFR BLD AUTO: 1 % (ref 0–6)
ERYTHROCYTE [DISTWIDTH] IN BLOOD BY AUTOMATED COUNT: 13.8 % (ref 11.6–15.1)
GFR SERPL CREATININE-BSD FRML MDRD: 104 ML/MIN/1.73SQ M
GLUCOSE SERPL-MCNC: 104 MG/DL (ref 65–140)
GLUCOSE SERPL-MCNC: 207 MG/DL (ref 65–140)
GLUCOSE SERPL-MCNC: 96 MG/DL (ref 65–140)
GLUCOSE SERPL-MCNC: 98 MG/DL (ref 65–140)
HCT VFR BLD AUTO: 36.3 % (ref 34.8–46.1)
HGB BLD-MCNC: 11.6 G/DL (ref 11.5–15.4)
IMM GRANULOCYTES # BLD AUTO: 0.03 THOUSAND/UL (ref 0–0.2)
IMM GRANULOCYTES NFR BLD AUTO: 0 % (ref 0–2)
LACTATE SERPL-SCNC: 0.4 MMOL/L (ref 0.5–2)
LYMPHOCYTES # BLD AUTO: 1.58 THOUSANDS/ΜL (ref 0.6–4.47)
LYMPHOCYTES NFR BLD AUTO: 19 % (ref 14–44)
MCH RBC QN AUTO: 31.8 PG (ref 26.8–34.3)
MCHC RBC AUTO-ENTMCNC: 32 G/DL (ref 31.4–37.4)
MCV RBC AUTO: 100 FL (ref 82–98)
MONOCYTES # BLD AUTO: 0.71 THOUSAND/ΜL (ref 0.17–1.22)
MONOCYTES NFR BLD AUTO: 9 % (ref 4–12)
NEUTROPHILS # BLD AUTO: 5.81 THOUSANDS/ΜL (ref 1.85–7.62)
NEUTS SEG NFR BLD AUTO: 71 % (ref 43–75)
NRBC BLD AUTO-RTO: 0 /100 WBCS
PLATELET # BLD AUTO: 177 THOUSANDS/UL (ref 149–390)
PMV BLD AUTO: 8.9 FL (ref 8.9–12.7)
POTASSIUM SERPL-SCNC: 3.5 MMOL/L (ref 3.5–5.3)
PROCALCITONIN SERPL-MCNC: <0.05 NG/ML
RBC # BLD AUTO: 3.65 MILLION/UL (ref 3.81–5.12)
SODIUM SERPL-SCNC: 139 MMOL/L (ref 136–145)
WBC # BLD AUTO: 8.21 THOUSAND/UL (ref 4.31–10.16)

## 2020-01-10 PROCEDURE — 80048 BASIC METABOLIC PNL TOTAL CA: CPT | Performed by: PHYSICIAN ASSISTANT

## 2020-01-10 PROCEDURE — 94640 AIRWAY INHALATION TREATMENT: CPT

## 2020-01-10 PROCEDURE — 87040 BLOOD CULTURE FOR BACTERIA: CPT | Performed by: PHYSICIAN ASSISTANT

## 2020-01-10 PROCEDURE — 85025 COMPLETE CBC W/AUTO DIFF WBC: CPT | Performed by: PHYSICIAN ASSISTANT

## 2020-01-10 PROCEDURE — 82948 REAGENT STRIP/BLOOD GLUCOSE: CPT

## 2020-01-10 PROCEDURE — 74176 CT ABD & PELVIS W/O CONTRAST: CPT

## 2020-01-10 PROCEDURE — 84145 PROCALCITONIN (PCT): CPT | Performed by: PHYSICIAN ASSISTANT

## 2020-01-10 PROCEDURE — 99232 SBSQ HOSP IP/OBS MODERATE 35: CPT | Performed by: NURSE PRACTITIONER

## 2020-01-10 PROCEDURE — 83605 ASSAY OF LACTIC ACID: CPT | Performed by: PHYSICIAN ASSISTANT

## 2020-01-10 PROCEDURE — 94760 N-INVAS EAR/PLS OXIMETRY 1: CPT

## 2020-01-10 PROCEDURE — 99223 1ST HOSP IP/OBS HIGH 75: CPT | Performed by: INTERNAL MEDICINE

## 2020-01-10 PROCEDURE — 99232 SBSQ HOSP IP/OBS MODERATE 35: CPT | Performed by: INTERNAL MEDICINE

## 2020-01-10 PROCEDURE — C9113 INJ PANTOPRAZOLE SODIUM, VIA: HCPCS | Performed by: PHYSICIAN ASSISTANT

## 2020-01-10 RX ORDER — DIPHENHYDRAMINE HYDROCHLORIDE 50 MG/ML
25 INJECTION INTRAMUSCULAR; INTRAVENOUS EVERY 6 HOURS PRN
Status: DISCONTINUED | OUTPATIENT
Start: 2020-01-10 | End: 2020-01-11 | Stop reason: HOSPADM

## 2020-01-10 RX ORDER — LORAZEPAM 2 MG/ML
0.5 INJECTION INTRAMUSCULAR EVERY 4 HOURS PRN
Status: DISCONTINUED | OUTPATIENT
Start: 2020-01-10 | End: 2020-01-11 | Stop reason: HOSPADM

## 2020-01-10 RX ORDER — PANTOPRAZOLE SODIUM 40 MG/1
40 INJECTION, POWDER, FOR SOLUTION INTRAVENOUS EVERY 12 HOURS
Status: DISCONTINUED | OUTPATIENT
Start: 2020-01-10 | End: 2020-01-11 | Stop reason: HOSPADM

## 2020-01-10 RX ORDER — METOCLOPRAMIDE HYDROCHLORIDE 5 MG/ML
10 INJECTION INTRAMUSCULAR; INTRAVENOUS EVERY 6 HOURS PRN
Status: DISCONTINUED | OUTPATIENT
Start: 2020-01-10 | End: 2020-01-11 | Stop reason: HOSPADM

## 2020-01-10 RX ORDER — PROMETHAZINE HYDROCHLORIDE 25 MG/ML
12.5 INJECTION, SOLUTION INTRAMUSCULAR; INTRAVENOUS ONCE
Status: COMPLETED | OUTPATIENT
Start: 2020-01-10 | End: 2020-01-10

## 2020-01-10 RX ORDER — OXYBUTYNIN CHLORIDE 5 MG/1
15 TABLET, EXTENDED RELEASE ORAL DAILY
Status: DISCONTINUED | OUTPATIENT
Start: 2020-01-11 | End: 2020-01-11 | Stop reason: HOSPADM

## 2020-01-10 RX ORDER — OXYBUTYNIN CHLORIDE 15 MG/1
15 TABLET, EXTENDED RELEASE ORAL DAILY
Qty: 7 TABLET | Refills: 0 | Status: ON HOLD | OUTPATIENT
Start: 2020-01-11 | End: 2020-02-04

## 2020-01-10 RX ORDER — PHENAZOPYRIDINE HYDROCHLORIDE 100 MG/1
100 TABLET, FILM COATED ORAL
Qty: 6 TABLET | Refills: 0 | Status: SHIPPED | OUTPATIENT
Start: 2020-01-10 | End: 2020-01-12

## 2020-01-10 RX ORDER — PHENAZOPYRIDINE HYDROCHLORIDE 100 MG/1
100 TABLET, FILM COATED ORAL
Status: DISCONTINUED | OUTPATIENT
Start: 2020-01-10 | End: 2020-01-11 | Stop reason: HOSPADM

## 2020-01-10 RX ADMIN — PHENAZOPYRIDINE 100 MG: 100 TABLET ORAL at 14:23

## 2020-01-10 RX ADMIN — METOCLOPRAMIDE 10 MG: 5 INJECTION, SOLUTION INTRAMUSCULAR; INTRAVENOUS at 04:42

## 2020-01-10 RX ADMIN — VALGANCICLOVIR 900 MG: 450 TABLET, FILM COATED ORAL at 08:30

## 2020-01-10 RX ADMIN — SODIUM CHLORIDE 150 ML/HR: 0.9 INJECTION, SOLUTION INTRAVENOUS at 18:27

## 2020-01-10 RX ADMIN — MAGNESIUM GLUCONATE 500 MG ORAL TABLET 400 MG: 500 TABLET ORAL at 08:35

## 2020-01-10 RX ADMIN — HYDROMORPHONE HYDROCHLORIDE 1 MG: 1 INJECTION, SOLUTION INTRAMUSCULAR; INTRAVENOUS; SUBCUTANEOUS at 01:59

## 2020-01-10 RX ADMIN — FERROUS SULFATE TAB 325 MG (65 MG ELEMENTAL FE) 325 MG: 325 (65 FE) TAB at 08:36

## 2020-01-10 RX ADMIN — PREDNISONE 10 MG: 10 TABLET ORAL at 08:35

## 2020-01-10 RX ADMIN — TACROLIMUS 2 MG: 1 CAPSULE ORAL at 08:35

## 2020-01-10 RX ADMIN — PANCRELIPASE 48000 UNITS: 24000; 76000; 120000 CAPSULE, DELAYED RELEASE PELLETS ORAL at 16:44

## 2020-01-10 RX ADMIN — DRONABINOL 5 MG: 2.5 CAPSULE ORAL at 14:17

## 2020-01-10 RX ADMIN — ATOVAQUONE 750 MG: 750 SUSPENSION ORAL at 08:37

## 2020-01-10 RX ADMIN — OXYBUTYNIN CHLORIDE 5 MG: 5 TABLET, EXTENDED RELEASE ORAL at 08:36

## 2020-01-10 RX ADMIN — TACROLIMUS 2 MG: 1 CAPSULE ORAL at 22:05

## 2020-01-10 RX ADMIN — LORAZEPAM 0.5 MG: 2 INJECTION INTRAMUSCULAR; INTRAVENOUS at 04:52

## 2020-01-10 RX ADMIN — PROMETHAZINE HYDROCHLORIDE 12.5 MG: 25 INJECTION INTRAMUSCULAR; INTRAVENOUS at 04:52

## 2020-01-10 RX ADMIN — HYDROMORPHONE HYDROCHLORIDE 0.5 MG: 1 INJECTION, SOLUTION INTRAMUSCULAR; INTRAVENOUS; SUBCUTANEOUS at 11:51

## 2020-01-10 RX ADMIN — PROMETHAZINE HYDROCHLORIDE 12.5 MG: 25 INJECTION INTRAMUSCULAR; INTRAVENOUS at 00:13

## 2020-01-10 RX ADMIN — FERROUS SULFATE TAB 325 MG (65 MG ELEMENTAL FE) 325 MG: 325 (65 FE) TAB at 16:44

## 2020-01-10 RX ADMIN — DIPHENHYDRAMINE HYDROCHLORIDE 25 MG: 50 INJECTION, SOLUTION INTRAMUSCULAR; INTRAVENOUS at 11:52

## 2020-01-10 RX ADMIN — PANCRELIPASE 48000 UNITS: 24000; 76000; 120000 CAPSULE, DELAYED RELEASE PELLETS ORAL at 14:17

## 2020-01-10 RX ADMIN — HYDROMORPHONE HYDROCHLORIDE 0.5 MG: 1 INJECTION, SOLUTION INTRAMUSCULAR; INTRAVENOUS; SUBCUTANEOUS at 18:59

## 2020-01-10 RX ADMIN — KETOROLAC TROMETHAMINE 15 MG: 30 INJECTION, SOLUTION INTRAMUSCULAR at 16:44

## 2020-01-10 RX ADMIN — SODIUM CHLORIDE 150 ML/HR: 0.9 INJECTION, SOLUTION INTRAVENOUS at 11:57

## 2020-01-10 RX ADMIN — DRONABINOL 5 MG: 2.5 CAPSULE ORAL at 16:41

## 2020-01-10 RX ADMIN — DRONABINOL 5 MG: 2.5 CAPSULE ORAL at 08:35

## 2020-01-10 RX ADMIN — COLISTIMETHATE SODIUM 150 MG: 150 INJECTION, POWDER, LYOPHILIZED, FOR SOLUTION INTRAMUSCULAR; INTRAVENOUS at 08:12

## 2020-01-10 RX ADMIN — PHENAZOPYRIDINE 100 MG: 100 TABLET ORAL at 16:43

## 2020-01-10 RX ADMIN — HYDROMORPHONE HYDROCHLORIDE 1 MG: 1 INJECTION, SOLUTION INTRAMUSCULAR; INTRAVENOUS; SUBCUTANEOUS at 08:34

## 2020-01-10 RX ADMIN — PANTOPRAZOLE SODIUM 40 MG: 40 INJECTION, POWDER, FOR SOLUTION INTRAVENOUS at 14:17

## 2020-01-10 RX ADMIN — COLISTIMETHATE SODIUM 150 MG: 150 INJECTION, POWDER, LYOPHILIZED, FOR SOLUTION INTRAMUSCULAR; INTRAVENOUS at 20:31

## 2020-01-10 RX ADMIN — METOCLOPRAMIDE 10 MG: 5 INJECTION, SOLUTION INTRAMUSCULAR; INTRAVENOUS at 11:52

## 2020-01-10 RX ADMIN — SODIUM CHLORIDE 150 ML/HR: 0.9 INJECTION, SOLUTION INTRAVENOUS at 04:25

## 2020-01-10 RX ADMIN — METRONIDAZOLE 500 MG: 500 INJECTION, SOLUTION INTRAVENOUS at 05:50

## 2020-01-10 RX ADMIN — CEFEPIME HYDROCHLORIDE 2000 MG: 2 INJECTION, POWDER, FOR SOLUTION INTRAVENOUS at 00:38

## 2020-01-10 NOTE — PROGRESS NOTES
Progress Note Gutierrez Fortune 45 y o  female MRN: [id number]    Unit/Bed#: -01 Encounter: [id number]      Assessment:  Bentley Canales is a 51-year-old female with history of cystic fibrosis status post lung transplantation; admitted for right flank pain secondary to CT confirmed right ureteral calculus with resultant hydronephrosis  Patient was awaiting transfer to [address] [place] [address], but bed was not available  Although afebrile, patient was significantly colicky  She is now postoperative day 1 cystoscopy, retrograde pyelography and right ureteral stent placement  She remains afebrile with normal renal function and without leukocytosis  To has persistent right lower quadrant pain and nausea  Repeat CT can scan ordered by primary team   Findings inconsistent with  tract obstruction  However, there is evidence of ascending colon inflammation suggestive of colitis  Plan:  Continue medical optimization  Stent colic can be treated with the following:  Adequate hydration, bowel regimen, anti inflammatory, anti spasmodic and modest narcotics  Maintain ureteral stent  Further definitive stone management will be determined at [address] [place] [address]  This can be done electively  Recommend against any further  intervention at this time for another few weeks  Urologic consultation should be interval part of patient care plan at [address] [place] [address]  Patient has pending appointment at [facility]  No further  intervention indicated during this hospital stay  Will sign off  Subjective:   Denies fever, chills,  Endorses mild suprapubic pain and urinary urgency    Objective:     Vitals: Blood pressure 141/83, pulse 85, temperature 99 8 °F (37 7 °C), resp  rate 16, height 5' 2" (1 575 m), weight 50 9 kg (112 lb 3 4 oz), last menstrual period 01/04/2020, SpO2 94 %, not currently breastfeeding  ,Body mass index is 20 52 kg/m²        Intake/Output Summary (Last 24 hours) at 1/10/2020 0840  Last data filed at 1/10/2020 0551  Gross per 24 hour   Intake 2932 5 ml   Output 1150 ml   Net 1782 5 ml       Physical Exam: General appearance: alert and oriented, in no acute distress, appears stated age, cooperative and no distress  Head: Normocephalic, without obvious abnormality, atraumatic  Neck: no adenopathy, no carotid bruit, no JVD, supple, symmetrical, trachea midline and thyroid not enlarged, symmetric, no tenderness/mass/nodules  Lungs: diminished breath sounds  Heart: regular rate and rhythm, S1, S2 normal, no murmur, click, rub or gallop  Abdomen: soft, non-tender; bowel sounds normal; no masses,  no organomegaly  Extremities: extremities normal, warm and well-perfused; no cyanosis, clubbing, or edema  Pulses: 2+ and symmetric  Neurologic: Grossly normal  No urinary drains     Invasive Devices     Central Venous Catheter Line            Port A Cath Right Chest -- days          Drain            Ureteral Drain/Stent Right ureter 6 Fr  less than 1 day    Urethral Catheter 18 Fr  less than 1 day              Lab Results   Component Value Date    WBC 8 21 01/10/2020    HGB 11 6 01/10/2020    HCT 36 3 01/10/2020     (H) 01/10/2020     01/10/2020     Lab Results   Component Value Date    SODIUM 139 01/10/2020    K 3 5 01/10/2020     01/10/2020    CO2 26 01/10/2020    BUN 9 01/10/2020    CREATININE 0 72 01/10/2020    GLUC 104 01/10/2020    CALCIUM 7 4 (L) 01/10/2020         Lab, Imaging and other studies: I have personally reviewed pertinent reports

## 2020-01-10 NOTE — CONSULTS
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 and had a colonoscopy about 2 week ago and had polyps removed but she reports no active colitis at that time  She is not on maintenance medications  She denies any recent diarrhea, last BM was 1-2 days ago and was normal  She denies any melena or hematochezia  She does have chronic GERD for which she takes dexilant  She continues to have R groin pain today as well as nausea and vomiting      REVIEW OF SYSTEMS: Negative except for as stated above      Historical Information   Past Medical History:   Diagnosis Date    Asthma     Chronic pain     Cystic fibrosis ([facility] [address] )     Transplant recipient      Past Surgical History:   Procedure Laterality Date    FL RETROGRADE PYELOGRAM  1/9/2020    LUNG SURGERY      HI CYSTOURETHROSCOPY,URETER CATHETER Right 1/9/2020    Procedure: CYSTOSCOPY RETROGRADE PYELOGRAM WITH INSERTION STENT URETERAL;  Surgeon: Evelin Morocho MD;  Location: [facility];  Service: Urology     Social History   Social History     Substance and Sexual Activity   Alcohol Use Yes    Frequency: 2-4 times a month    Comment: social     Social History     Substance and Sexual Activity   Drug Use Yes    Frequency: 2 0 times per week    Types: Marijuana    Comment: medical edible marijuana prescribed     Social History     Tobacco Use   Smoking Status Never Smoker   Smokeless Tobacco Never Used     Family History   Family history unknown: Yes       Meds/Allergies     Medications Prior to Admission   Medication    albuterol (ACCUNEB) 1 25 MG/3ML nebulizer solution    alendronate (FOSAMAX) 70 mg tablet    ALPRAZolam (XANAX) 1 mg tablet    atovaquone (MEPRON) 750 mg/5 mL suspension    colistimethate (COLY-MYCIN M) inhalation solution    dexlansoprazole (DEXILANT) 60 MG capsule    dronabinol (MARINOL) 5 MG capsule    ferrous sulfate 325 (65 Fe) mg tablet    magnesium oxide (MAG-OX) 400 mg    multivitamin (THERAGRAN) TABS    pancrelipase, Lip-Prot-Amyl, (CREON) 24,000 units    predniSONE 10 mg tablet    tacrolimus (PROGRAF) 1 mg capsule    valGANciclovir (VALCYTE) 450 mg tablet    gabapentin (NEURONTIN) 100 mg capsule     Current Facility-Administered Medications   Medication Dose Route Frequency    acetaminophen (TYLENOL) tablet 650 mg  650 mg Oral Q6H PRN    albuterol inhalation solution 2 5 mg  2 5 mg Nebulization Q8H PRN    ALPRAZolam (XANAX) tablet 1 mg  1 mg Oral BID PRN    atovaquone (MEPRON) oral suspension 750 mg  750 mg Oral Daily    bisacodyl (DULCOLAX) rectal suppository 10 mg  10 mg Rectal Daily PRN    calcium carbonate (TUMS) chewable tablet 1,000 mg  1,000 mg Oral Daily PRN    Colistimethate Sodium (CBA) (COLY-MYCIN M) inhalation solution 150 mg  150 mg Nebulization Q12H    diphenhydrAMINE (BENADRYL) injection 25 mg  25 mg Intravenous Q6H PRN    dronabinol (MARINOL) capsule 5 mg  5 mg Oral TID AC    ferrous sulfate tablet 325 mg  325 mg Oral BID With Meals    gabapentin (NEURONTIN) capsule 100 mg  100 mg Oral BID PRN    HYDROmorphone (DILAUDID) injection 0 5 mg  0 5 mg Intravenous Q3H PRN    HYDROmorphone (DILAUDID) injection 1 mg  1 mg Intravenous Q3H PRN    insulin lispro (HumaLOG) 100 units/mL subcutaneous injection 1-5 Units  1-5 Units Subcutaneous Q6H [address]    ketorolac (TORADOL) injection 15 mg  15 mg Intravenous Q6H PRN    LORazepam (ATIVAN) 2 mg/mL injection 0 5 mg  0 5 mg Intravenous Q4H PRN    magnesium oxide (MAG-OX) tablet 400 mg  400 mg Oral BID    metoclopramide (REGLAN) injection 10 mg  10 mg Intravenous Q6H PRN    [START ON 1/11/2020] oxybutynin (DITROPAN-XL) 24 hr tablet 15 mg  15 mg Oral Daily    pancrelipase (Lip-Prot-Amyl) (CREON) delayed release capsule 48,000 Units  48,000 Units Oral TID With Meals    pantoprazole (PROTONIX) injection 40 mg  40 mg Intravenous Q12H    phenazopyridine (PYRIDIUM) tablet 100 mg  100 mg Oral TID With Meals    predniSONE tablet 10 mg  10 mg Oral Daily    sodium chloride 0 9 % infusion  150 mL/hr Intravenous Continuous    tacrolimus (PROGRAF) capsule 2 mg  2 mg Oral Q12H [address]    tacrolimus (PROGRAF) capsule 2 mg  2 mg Oral Once    valGANciclovir (VALCYTE) tablet 900 mg  900 mg Oral Daily With Breakfast       Allergies   Allergen Reactions    Vancomycin Angioedema and Hives    Ceftazidime Headache     Severe headaches after desensitization           Objective     Blood pressure 141/83, pulse 85, temperature 99 8 °F (37 7 °C), resp  rate 16, height 5' 2" (1 575 m), weight 50 9 kg (112 lb 3 4 oz), last menstrual period 01/04/2020, SpO2 94 %, not currently breastfeeding        Intake/Output Summary (Last 24 hours) at 1/10/2020 1244  Last data filed at 1/10/2020 1100  Gross per 24 hour   Intake 3082 5 ml   Output 1900 ml   Net 1182 5 ml         PHYSICAL EXAM:      General Appearance:   Alert, oriented x3, appears uncomfortable, nauseous and vomiting    HEENT:   Normocephalic, atraumatic, anicteric      Neck:  Supple, symmetrical, trachea midline   Lungs:   Clear to auscultation bilaterally; no rales, rhonchi or wheezing; respirations unlabored  Heart[de-identified]   S1 and S2 normal; regular rate and rhythm; no murmur, rub, or gallop  Abdomen:   Non-distended, soft, BS active, NTTP   Rectal:   Deferred    Extremities:  No cyanosis, clubbing or edema    Pulses:  2+ and symmetric all extremities    Skin:  No jaundice or palor, no rashes or lesions      Lab Results:   Results from last 7 days   Lab Units 01/10/20  0010   WBC Thousand/uL 8 21   HEMOGLOBIN g/dL 11 6   HEMATOCRIT % 36 3   PLATELETS Thousands/uL 177   NEUTROS PCT % 71   LYMPHS PCT % 19   MONOS PCT % 9   EOS PCT % 1     Results from last 7 days   Lab Units 01/10/20  0010  01/08/20  2052   POTASSIUM mmol/L 3 5   < > 3 8   CHLORIDE mmol/L 105   < > 104   CO2 mmol/L 26   < > 24   BUN mg/dL 9   < > 17   CREATININE mg/dL 0 72   < > 0 86   CALCIUM mg/dL 7 4*   < > 8 6   ALK PHOS U/L  --   --  94   ALT U/L  --   --  29   AST U/L  --   --  15    < > = values in this interval not displayed  Results from last 7 days   Lab Units 01/08/20 2052   LIPASE u/L 21*       Imaging Studies: I have personally reviewed pertinent imaging studies  Ct Abdomen Pelvis Wo Contrast  Result Date: 1/10/2020  Impression: Terminal ileal enteritis and proximal ascending colon and cecal colitis    Right ureteral double-J stent has been placed in the interval   Previously seen obstructing stone in the right UPJ no longer visualized  Previously seen right hydronephrosis has essentially resolved  2 mm nonobstructing stone is seen in the interpolar right kidney  12 mm nonobstructing stones in the left kidney  No hydronephrosis  Stable bibasilar ill-defined opacities and tiny nodular densities with tree-in-bud appearance and bronchiectasis  Surgical clip in the medial left lung base   Workstation performed: [id number]     Fl Retrograde Pyelogram  Result Date: 1/9/2020  Impression: Fluoroscopic guidance provided for right retrograde pyelogram   Moderate hydronephrosis with filling defect at the ureteropelvic junction noted, corresponding to obstructing calculus on CT  Nephroureteral was left in place  Please see procedure report for further details  Workstation performed: [id number]     Ct Abdomen Pelvis With Contrast  Result Date: 1/8/2020  Impression: 1  Mild right hydronephrosis secondary to 8 mm calculus at the right UPJ  2   Nonobstructing bilateral renal calculi  3   Suggestion of mild wall thickening of the ascending colon, hepatic flexure and transverse colon probably related to underdistention, however correlate for colitis  4   Grossly stable bibasilar nodular densities with tree in bud appearance and bronchiectasis  The study was marked in [facility] for immediate notification  Workstation performed: [id number]       ASSESSMENT and PLAN:      Nausea/Vomiting  - Persistent in the setting of R UPJ obstructing stones s/p cystoscopy and stent placement yesterday  - She has groin pain, no abdominal pain on exam to suggest another etiology  - Recommend continuing reglan PRN with benadryl as she reports this is the only thing that has provided relief    Abnormal CT Scan  Ulcerative Colitis  - CT x 2 during admission suggests terminal ileitis, cecal/ascending colitis  - Pt reports history of ulcerative colitis and colonoscopy 2 weeks ago which did not show any active disease, no on any maintenance medications  - She has no active symptoms of an ulcerative colitis flare such as diarrhea, hematochezia, or typical abdominal pain  - Continue supportive care and recommend follow up with her outpatient GI after recovered from urologic issues  - No indication to repeat colonoscopy        Patient will be seen by Dr Fabiano Lyn  GI will sign off, call with questions

## 2020-01-10 NOTE — ASSESSMENT & PLAN NOTE
Patient did have nausea vomiting and abdominal pain last night  CT demonstrated terminal ileitis, cecal ascending colitis  No white count  Mild fever last night which could be related to stent  Continue to monitor off antibiotics      Discussed with GI, following  Continue supportive care  Advanced diet as tolerated

## 2020-01-10 NOTE — QUICK NOTE
Called to bedside by nursing staff 2/2 intractable N/V after multiple doses of Zofran ad Reglan  Appears ill on exam  States Rt groin pain has increased and having multiple bouts of vomiting causing her to be unable to take her immunosuppressant  Rectal temp 100 9   HR 85 /83  Per records underwent cystoscopy with Rt ureteral stent placement today and is awaiting transfer to [facility] for any further procedures requiring general anesthesia 2/2 s/p lung transplant     - Will re-scan abdomen  - Stat CBC, BMP, LA and procalcitonin  - BC x 2  - Phenergan 12 5mg IV x 1  - Initiate Cefepime  - Message sent to Dr Steph Jimenez (urology) via TT to update on status

## 2020-01-10 NOTE — ASSESSMENT & PLAN NOTE
· Hx of Lt lung transplant 2013 and 2017   Rt lung failure  · Transplant team - Dr Jaci Perez at [facility]  · Continue home dose Colistimethate neb BID  · Continue home dose Tacrolimus, Valganciclovir and Prednisone

## 2020-01-10 NOTE — PLAN OF CARE
Problem: PAIN - ADULT  Goal: Verbalizes/displays adequate comfort level or baseline comfort level  Description  Interventions:  - Encourage patient to monitor pain and request assistance  - Assess pain using appropriate pain scale  - Administer analgesics based on type and severity of pain and evaluate response  - Implement non-pharmacological measures as appropriate and evaluate response  - Consider cultural and social influences on pain and pain management  - Notify physician/advanced practitioner if interventions unsuccessful or patient reports new pain  Outcome: Progressing     Problem: INFECTION - ADULT  Goal: Absence or prevention of progression during hospitalization  Description  INTERVENTIONS:  - Assess and monitor for signs and symptoms of infection  - Monitor lab/diagnostic results  - Monitor all insertion sites, i e  indwelling lines, tubes, and drains  - Monitor endotracheal if appropriate and nasal secretions for changes in amount and color  - [place] appropriate cooling/warming therapies per order  - Administer medications as ordered  - Instruct and encourage patient and family to use good hand hygiene technique  - Identify and instruct in appropriate isolation precautions for identified infection/condition  Outcome: Progressing  Goal: Absence of fever/infection during neutropenic period  Description  INTERVENTIONS:  - Monitor WBC    Outcome: Progressing     Problem: SAFETY ADULT  Goal: Patient will remain free of falls  Description  INTERVENTIONS:  - Assess patient frequently for physical needs  -  Identify cognitive and physical deficits and behaviors that affect risk of falls    -  [place] fall precautions as indicated by assessment   - Educate patient/family on patient safety including physical limitations  - Instruct patient to call for assistance with activity based on assessment  - Modify environment to reduce risk of injury  - Consider OT/PT consult to assist with strengthening/mobility  Outcome: Progressing  Goal: Maintain or return to baseline ADL function  Description  INTERVENTIONS:  -  Assess patient's ability to carry out ADLs; assess patient's baseline for ADL function and identify physical deficits which impact ability to perform ADLs (bathing, care of mouth/teeth, toileting, grooming, dressing, etc )  - Assess/evaluate cause of self-care deficits   - Assess range of motion  - Assess patient's mobility; develop plan if impaired  - Assess patient's need for assistive devices and provide as appropriate  - Encourage maximum independence but intervene and supervise when necessary  - Involve family in performance of ADLs  - Assess for home care needs following discharge   - Consider OT consult to assist with ADL evaluation and planning for discharge  - Provide patient education as appropriate  Outcome: Progressing  Goal: Maintain or return mobility status to optimal level  Description  INTERVENTIONS:  - Assess patient's baseline mobility status (ambulation, transfers, stairs, etc )    - Identify cognitive and physical deficits and behaviors that affect mobility  - Identify mobility aids required to assist with transfers and/or ambulation (gait belt, sit-to-stand, lift, walker, cane, etc )  - [place] fall precautions as indicated by assessment  - Record patient progress and toleration of activity level on Mobility SBAR; progress patient to next Phase/Stage  - Instruct patient to call for assistance with activity based on assessment  - Consider rehabilitation consult to assist with strengthening/weightbearing, etc   Outcome: Progressing     Problem: DISCHARGE PLANNING  Goal: Discharge to home or other facility with appropriate resources  Description  INTERVENTIONS:  - Identify barriers to discharge w/patient and caregiver  - Arrange for needed discharge resources and transportation as appropriate  - Identify discharge learning needs (meds, wound care, etc )  - Arrange for interpretive services to assist at discharge as needed  - Refer to Case Management Department for coordinating discharge planning if the patient needs post-hospital services based on physician/advanced practitioner order or complex needs related to functional status, cognitive ability, or social support system  Outcome: Progressing     Problem: Knowledge Deficit  Goal: Patient/family/caregiver demonstrates understanding of disease process, treatment plan, medications, and discharge instructions  Description  Complete learning assessment and assess knowledge base  Interventions:  - Provide teaching at level of understanding  - Provide teaching via preferred learning methods  Outcome: Progressing     Problem: Potential for Falls  Goal: Patient will remain free of falls  Description  INTERVENTIONS:  - Assess patient frequently for physical needs  -  Identify cognitive and physical deficits and behaviors that affect risk of falls    -  [place] fall precautions as indicated by assessment   - Educate patient/family on patient safety including physical limitations  - Instruct patient to call for assistance with activity based on assessment  - Modify environment to reduce risk of injury  - Consider OT/PT consult to assist with strengthening/mobility  Outcome: Progressing

## 2020-01-10 NOTE — PROGRESS NOTES
Progress Note Maddy Vazquez Petite 1975, 45 y o  female MRN: [id number]    Unit/Bed#: -Alexandra Encounter: [id number]    Primary Care Provider: Radha Acevedo MD   Date and time admitted to hospital: 1/8/2020  8:12 PM        Abnormal CT scan, colitis  Assessment & Plan  Patient did have nausea vomiting and abdominal pain last night  CT demonstrated terminal ileitis, cecal ascending colitis  No white count  Mild fever last night which could be related to stent  Continue to monitor off antibiotics  Discussed with GI, following  Continue supportive care  Advanced diet as tolerated    Hyperglycemia  Assessment & Plan  · Likely 2/2 chronic Prednisone use  · FSBS AC & HS w/ SSI    Cystic fibrosis ([address] )  Assessment & Plan  · Hx of lung transplant as above  · Continue home dose Creon TID AC    History of lung transplant [facility])  Assessment & Plan  · Hx of Lt lung transplant 2013 and 2017  Rt lung failure  · Transplant team - Dr Benita Trejo at [facility]  · Continue home dose Colistimethate neb BID  · Continue home dose Tacrolimus, Valganciclovir and Prednisone      * Ureterolithiasis  Assessment & Plan    · CT abd/pelvis w/ revealed "Mild right hydronephrosis secondary to 8 mm calculus at the right UPJ "    Status post top day 1 cystoscopy, retrograde pyelography, right ureteral stent placement    Normal renal function  No leukocytosis  Repeat CT scan last night showed improvement in hydronephrosis  Continue fluid hydration bowel regimen and anti-inflammatory pain medication  Outpatient neurology follow-up after discharge        VTE Pharmacologic Prophylaxis:   Pharmacologic: Heparin  Mechanical VTE Prophylaxis in Place: Yes    Patient Centered Rounds: I have performed bedside rounds with nursing staff today  Discussions with Specialists or Other Care Team Provider:  Urology and Gastroenterology    Education and Discussions with Family / Patient patient    Time Spent for Care: 30 minutes    More than 50% of total time spent on counseling and coordination of care as described above  Current Length of Stay: 1 day(s)    Current Patient Status: Inpatient   Certification Statement: The patient will continue to require additional inpatient hospital stay due to Above medical problems    Discharge Plan:  Continue hospitalization    Code Status: Level 1 - Full Code      Subjective:   Patient seen and examined  Mildly nauseous this morning  Abdominal pain present  Fever of 100 9 last night  Since then afebrile  Otherwise no complaints  Objective:     Vitals:   Temp (24hrs), Av 9 °F (37 7 °C), Min:98 8 °F (37 1 °C), Max:100 9 °F (38 3 °C)    Temp:  [98 8 °F (37 1 °C)-100 9 °F (38 3 °C)] 99 8 °F (37 7 °C)  HR:  [82-89] 85  Resp:  [16-20] 16  BP: (128-141)/(83-86) 141/83  SpO2:  [87 %-95 %] 94 %  Body mass index is 20 52 kg/m²  Input and Output Summary (last 24 hours): Intake/Output Summary (Last 24 hours) at 1/10/2020 1502  Last data filed at 1/10/2020 1100  Gross per 24 hour   Intake 2982 5 ml   Output 1900 ml   Net 1082 5 ml       Physical Exam:     Physical Exam   Constitutional: She is oriented to person, place, and time  She appears well-developed and well-nourished  HENT:   Head: Normocephalic and atraumatic  Eyes: Pupils are equal, round, and reactive to light  EOM are normal    Neck: Normal range of motion  Neck supple  Cardiovascular: Normal rate and regular rhythm  Pulmonary/Chest: Effort normal and breath sounds normal    Abdominal: Soft  Bowel sounds are normal  There is tenderness  Musculoskeletal: Normal range of motion  Neurological: She is alert and oriented to person, place, and time  Skin: Skin is warm and dry           Additional Data:     Labs:    Results from last 7 days   Lab Units 01/10/20  0010   WBC Thousand/uL 8 21   HEMOGLOBIN g/dL 11 6   HEMATOCRIT % 36 3   PLATELETS Thousands/uL 177   NEUTROS PCT % 71   LYMPHS PCT % 19   MONOS PCT % 9   EOS PCT % 1     Results from last 7 days   Lab Units 01/10/20  0010  01/08/20 2052   SODIUM mmol/L 139   < > 138   POTASSIUM mmol/L 3 5   < > 3 8   CHLORIDE mmol/L 105   < > 104   CO2 mmol/L 26   < > 24   BUN mg/dL 9   < > 17   CREATININE mg/dL 0 72   < > 0 86   ANION GAP mmol/L 8   < > 10   CALCIUM mg/dL 7 4*   < > 8 6   ALBUMIN g/dL  --   --  3 7   TOTAL BILIRUBIN mg/dL  --   --  0 30   ALK PHOS U/L  --   --  94   ALT U/L  --   --  29   AST U/L  --   --  15   GLUCOSE RANDOM mg/dL 104   < > 196*    < > = values in this interval not displayed  Results from last 7 days   Lab Units 01/10/20  1115 01/10/20  0624 01/09/20  1802 01/09/20  1151 01/09/20  0541 01/09/20  0359   POC GLUCOSE mg/dl 96 98 155* 148* 116 115         Results from last 7 days   Lab Units 01/10/20  0504 01/10/20  0010 01/08/20 2052   LACTIC ACID mmol/L  --  0 4* 1 6   PROCALCITONIN ng/ml <0 05  --   --            * I Have Reviewed All Lab Data Listed Above  * Additional Pertinent Lab Tests Reviewed: Whit 66 Admission Reviewed    Imaging:    Imaging Reports Reviewed Today Include:   Imaging Personally Reviewed by Myself Includes:  CT of the abdomen    Recent Cultures (last 7 days):     Results from last 7 days   Lab Units 01/10/20  0011 01/09/20  1403   BLOOD CULTURE  Received in Microbiology Lab  Culture in Progress  Received in Microbiology Lab  Culture in Progress    --    URINE CULTURE   --  No Growth <1000 cfu/mL       Last 24 Hours Medication List:     Current Facility-Administered Medications:  acetaminophen 650 mg Oral Q6H PRN Suzie Hampton MD    albuterol 2 5 mg Nebulization Q8H PRN Christina Christensen MD    ALPRAZolam 1 mg Oral BID PRN Vargas Stuart PA-C    atovaquone 750 mg Oral Daily Christina Christensen MD    bisacodyl 10 mg Rectal Daily PRN Suzie Hampton MD    calcium carbonate 1,000 mg Oral Daily PRN Suzie Hampton MD    colistimethate (COLY-MYCIN M) inhalation solution 150 mg Nebulization Q12H [place] Nurse Delaney Bar MD    diphenhydrAMINE 25 mg Intravenous Q6H PRN Sabra Youssef PA-C    dronabinol 5 mg Oral TID AC River Gamez MD    ferrous sulfate 325 mg Oral BID With Meals River Gamez MD    gabapentin 100 mg Oral BID PRN River Gamez MD    HYDROmorphone 0 5 mg Intravenous Q3H PRN River Gamez MD    HYDROmorphone 1 mg Intravenous Q3H PRN River Gamez MD    insulin lispro 1-5 Units Subcutaneous Q6H [address] River Gamez MD    ketorolac 15 mg Intravenous Q6H PRN River Gamez MD    LORazepam 0 5 mg Intravenous Q4H PRN Roselyn Simpson PA-C    magnesium oxide 400 mg Oral BID River Gamez MD    metoclopramide 10 mg Intravenous Q6H PRN Marielle Chatman MD    [START ON 1/11/2020] oxybutynin 15 mg Oral Daily TurWAYNE Wadsworth    pancrelipase (Lip-Prot-Amyl) 48,000 Units Oral TID With Meals River Gamez MD    pantoprazole 40 mg Intravenous Q12H Sabra Youssef PA-C    phenazopyridine 100 mg Oral TID With Meals WAYNE Florentino    predniSONE 10 mg Oral Daily River Gamez MD    sodium chloride 150 mL/hr Intravenous Continuous River Gamez MD Last Rate: 150 mL/hr (01/10/20 1157)   tacrolimus 2 mg Oral Q12H [address] River Gamez MD    tacrolimus 2 mg Oral Once Feliciano Zhou PA-C    valGANciclovir 900 mg Oral Daily With Breakfast River Gamez MD         Today, Patient Was Seen By: Marielle Chatman MD    ** Please Note: Dictation voice to text software may have been used in the creation of this document   **

## 2020-01-10 NOTE — ASSESSMENT & PLAN NOTE
· CT abd/pelvis w/ revealed "Mild right hydronephrosis secondary to 8 mm calculus at the right UPJ "    Status post top day 1 cystoscopy, retrograde pyelography, right ureteral stent placement    Normal renal function  No leukocytosis  Repeat CT scan last night showed improvement in hydronephrosis    Continue fluid hydration bowel regimen and anti-inflammatory pain medication  Outpatient neurology follow-up after discharge

## 2020-01-11 VITALS
HEART RATE: 68 BPM | OXYGEN SATURATION: 93 % | BODY MASS INDEX: 20.65 KG/M2 | HEIGHT: 62 IN | RESPIRATION RATE: 17 BRPM | TEMPERATURE: 98.4 F | DIASTOLIC BLOOD PRESSURE: 72 MMHG | WEIGHT: 112.21 LBS | SYSTOLIC BLOOD PRESSURE: 123 MMHG

## 2020-01-11 LAB
ANION GAP SERPL CALCULATED.3IONS-SCNC: 8 MMOL/L (ref 4–13)
BUN SERPL-MCNC: 5 MG/DL (ref 5–25)
CALCIUM SERPL-MCNC: 7.4 MG/DL (ref 8.3–10.1)
CHLORIDE SERPL-SCNC: 109 MMOL/L (ref 100–108)
CO2 SERPL-SCNC: 25 MMOL/L (ref 21–32)
CREAT SERPL-MCNC: 0.71 MG/DL (ref 0.6–1.3)
ERYTHROCYTE [DISTWIDTH] IN BLOOD BY AUTOMATED COUNT: 13.4 % (ref 11.6–15.1)
GFR SERPL CREATININE-BSD FRML MDRD: 105 ML/MIN/1.73SQ M
GLUCOSE SERPL-MCNC: 161 MG/DL (ref 65–140)
GLUCOSE SERPL-MCNC: 218 MG/DL (ref 65–140)
GLUCOSE SERPL-MCNC: 98 MG/DL (ref 65–140)
GLUCOSE SERPL-MCNC: 99 MG/DL (ref 65–140)
HCT VFR BLD AUTO: 36.5 % (ref 34.8–46.1)
HGB BLD-MCNC: 11.6 G/DL (ref 11.5–15.4)
MCH RBC QN AUTO: 31 PG (ref 26.8–34.3)
MCHC RBC AUTO-ENTMCNC: 31.8 G/DL (ref 31.4–37.4)
MCV RBC AUTO: 98 FL (ref 82–98)
PLATELET # BLD AUTO: 165 THOUSANDS/UL (ref 149–390)
PMV BLD AUTO: 9 FL (ref 8.9–12.7)
POTASSIUM SERPL-SCNC: 3.4 MMOL/L (ref 3.5–5.3)
RBC # BLD AUTO: 3.74 MILLION/UL (ref 3.81–5.12)
SODIUM SERPL-SCNC: 142 MMOL/L (ref 136–145)
WBC # BLD AUTO: 6.19 THOUSAND/UL (ref 4.31–10.16)

## 2020-01-11 PROCEDURE — 82948 REAGENT STRIP/BLOOD GLUCOSE: CPT

## 2020-01-11 PROCEDURE — 99239 HOSP IP/OBS DSCHRG MGMT >30: CPT | Performed by: INTERNAL MEDICINE

## 2020-01-11 PROCEDURE — 94760 N-INVAS EAR/PLS OXIMETRY 1: CPT

## 2020-01-11 PROCEDURE — C9113 INJ PANTOPRAZOLE SODIUM, VIA: HCPCS | Performed by: PHYSICIAN ASSISTANT

## 2020-01-11 PROCEDURE — 85027 COMPLETE CBC AUTOMATED: CPT | Performed by: INTERNAL MEDICINE

## 2020-01-11 PROCEDURE — 94640 AIRWAY INHALATION TREATMENT: CPT

## 2020-01-11 PROCEDURE — 80048 BASIC METABOLIC PNL TOTAL CA: CPT | Performed by: INTERNAL MEDICINE

## 2020-01-11 RX ORDER — OXYCODONE HYDROCHLORIDE 5 MG/1
5 TABLET ORAL EVERY 8 HOURS PRN
Qty: 15 TABLET | Refills: 0 | Status: SHIPPED | OUTPATIENT
Start: 2020-01-11 | End: 2020-03-07 | Stop reason: HOSPADM

## 2020-01-11 RX ADMIN — ALPRAZOLAM 1 MG: 0.5 TABLET ORAL at 04:54

## 2020-01-11 RX ADMIN — OXYBUTYNIN 15 MG: 5 TABLET, FILM COATED, EXTENDED RELEASE ORAL at 09:57

## 2020-01-11 RX ADMIN — METOCLOPRAMIDE 10 MG: 5 INJECTION, SOLUTION INTRAMUSCULAR; INTRAVENOUS at 10:37

## 2020-01-11 RX ADMIN — DRONABINOL 5 MG: 2.5 CAPSULE ORAL at 12:55

## 2020-01-11 RX ADMIN — DRONABINOL 5 MG: 2.5 CAPSULE ORAL at 08:27

## 2020-01-11 RX ADMIN — HYDROMORPHONE HYDROCHLORIDE 0.5 MG: 1 INJECTION, SOLUTION INTRAMUSCULAR; INTRAVENOUS; SUBCUTANEOUS at 14:24

## 2020-01-11 RX ADMIN — PHENAZOPYRIDINE 100 MG: 100 TABLET ORAL at 12:55

## 2020-01-11 RX ADMIN — MAGNESIUM GLUCONATE 500 MG ORAL TABLET 400 MG: 500 TABLET ORAL at 09:18

## 2020-01-11 RX ADMIN — ACETAMINOPHEN 650 MG: 325 TABLET, FILM COATED ORAL at 04:54

## 2020-01-11 RX ADMIN — KETOROLAC TROMETHAMINE 15 MG: 30 INJECTION, SOLUTION INTRAMUSCULAR at 03:48

## 2020-01-11 RX ADMIN — HYDROMORPHONE HYDROCHLORIDE 0.5 MG: 1 INJECTION, SOLUTION INTRAMUSCULAR; INTRAVENOUS; SUBCUTANEOUS at 05:07

## 2020-01-11 RX ADMIN — INSULIN LISPRO 1 UNITS: 100 INJECTION, SOLUTION INTRAVENOUS; SUBCUTANEOUS at 13:06

## 2020-01-11 RX ADMIN — ALBUTEROL SULFATE 2.5 MG: 2.5 SOLUTION RESPIRATORY (INHALATION) at 11:59

## 2020-01-11 RX ADMIN — PREDNISONE 10 MG: 10 TABLET ORAL at 09:18

## 2020-01-11 RX ADMIN — HYDROMORPHONE HYDROCHLORIDE 1 MG: 1 INJECTION, SOLUTION INTRAMUSCULAR; INTRAVENOUS; SUBCUTANEOUS at 09:56

## 2020-01-11 RX ADMIN — SODIUM CHLORIDE 150 ML/HR: 0.9 INJECTION, SOLUTION INTRAVENOUS at 07:35

## 2020-01-11 RX ADMIN — SODIUM CHLORIDE 150 ML/HR: 0.9 INJECTION, SOLUTION INTRAVENOUS at 00:51

## 2020-01-11 RX ADMIN — ATOVAQUONE 750 MG: 750 SUSPENSION ORAL at 09:19

## 2020-01-11 RX ADMIN — PANTOPRAZOLE SODIUM 40 MG: 40 INJECTION, POWDER, FOR SOLUTION INTRAVENOUS at 12:29

## 2020-01-11 RX ADMIN — FERROUS SULFATE TAB 325 MG (65 MG ELEMENTAL FE) 325 MG: 325 (65 FE) TAB at 08:27

## 2020-01-11 RX ADMIN — PANCRELIPASE 48000 UNITS: 24000; 76000; 120000 CAPSULE, DELAYED RELEASE PELLETS ORAL at 12:55

## 2020-01-11 RX ADMIN — PANTOPRAZOLE SODIUM 40 MG: 40 INJECTION, POWDER, FOR SOLUTION INTRAVENOUS at 00:53

## 2020-01-11 RX ADMIN — VALGANCICLOVIR 900 MG: 450 TABLET, FILM COATED ORAL at 08:27

## 2020-01-11 RX ADMIN — PANCRELIPASE 48000 UNITS: 24000; 76000; 120000 CAPSULE, DELAYED RELEASE PELLETS ORAL at 08:28

## 2020-01-11 RX ADMIN — PHENAZOPYRIDINE 100 MG: 100 TABLET ORAL at 08:27

## 2020-01-11 RX ADMIN — TACROLIMUS 2 MG: 1 CAPSULE ORAL at 09:18

## 2020-01-11 NOTE — ASSESSMENT & PLAN NOTE
· Hx of Lt lung transplant 2013 and 2017   Rt lung failure  · Transplant team - Dr Britt Hernández at [facility]  · Continue home dose Colistimethate neb BID  · Continue home dose Tacrolimus, Valganciclovir and Prednisone

## 2020-01-11 NOTE — DISCHARGE SUMMARY
Discharge- Simon Gibbons 1975, 45 y o  female MRN: [id number]    Unit/Bed#: -Alexandra Encounter: [id number]    Primary Care Provider: Isadora Apley, MD   Date and time admitted to hospital: 1/8/2020  8:12 PM        Abnormal CT scan, colitis  Assessment & Plan  Patient did have nausea vomiting and abdominal pain last night  CT demonstrated terminal ileitis, cecal ascending colitis  No white count  Mild fever last night which could be related to stent  Continue to monitor off antibiotics  Discussed with GI, following  Continue supportive care  Advanced diet as tolerated    Hyperglycemia  Assessment & Plan  · Likely 2/2 chronic Prednisone use  · FSBS AC & HS w/ SSI    Cystic fibrosis ([facility] [address] )  Assessment & Plan  · Hx of lung transplant as above  · Continue home dose Creon TID AC    History of lung transplant [facility])  Assessment & Plan  · Hx of Lt lung transplant 2013 and 2017  Rt lung failure  · Transplant team - Dr Onur Edwards at [facility]  · Continue home dose Colistimethate neb BID  · Continue home dose Tacrolimus, Valganciclovir and Prednisone      * Ureterolithiasis  Assessment & Plan    · CT abd/pelvis w/ revealed "Mild right hydronephrosis secondary to 8 mm calculus at the right UPJ "    Status post top day 3 cystoscopy, retrograde pyelography, right ureteral stent placement    Normal renal function  No leukocytosis  Repeat CT scan  showed improvement in hydronephrosis    Continue fluid hydration bowel regimen and anti-inflammatory pain medication  Outpatient urology follow-up after discharge        Discharging Physician / Practitioner: Nallely Drummond MD  PCP: Isadora Apley, MD  Admission Date:   Admission Orders (From admission, onward)     Ordered        01/09/20 1553  Inpatient Admission  Once         01/09/20 0053  Place in Observation  Once                   Discharge Date: 01/11/20    Resolved Problems  Date Reviewed: 1/11/2020    None          Consultations During Hospital Stay:  urology, Gastroenterology    Procedures Performed:     Cystoscopy ureteroscopy and stent placement    Significant Findings / Test Results:   · CT of the abdomen  1  Mild right hydronephrosis secondary to 8 mm calculus at the right UPJ      2  Nonobstructing bilateral renal calculi      3   Suggestion of mild wall thickening of the ascending colon, hepatic flexure and transverse colon probably related to underdistention, however correlate for colitis      4   Grossly stable bibasilar nodular densities with tree in bud appearance and bronchiectasis  Incidental Findings:   · None    Test Results Pending at Discharge (will require follow up): · None     Outpatient Tests Requested:  · None    Complications:  None    Reason for Admission:     Hospital Course:     Davi Linn is a 45 y o  female patient with past medical history of cystic fibrosis, 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 who originally presented to the hospital on 1/8/2020 due to right flank pain, nausea and vomiting  CT scan on admission demonstrated right-sided hydronephrosis with 8 mm calculus in right UPJ  Urology evaluated patient though the initial plan was for to transfer to [facility] the transfer process was delayed due to bed availability  She underwent cystoscopy with stent placement here by Urology  Postoperatively developed some abdominal pain, repeat CT scan at this time demonstrated resolving of hydronephrosis  However she had some abnormal CT scan findings consistent with colitis  Gastroenterology evaluated patient, recommended further inpatient management and recommended symptomatic management  Patient clinically improved well  On the day of discharge she was able to tolerate diet well  Pain was well controlled  Recommended close outpatient follow-up with Urology gastroenterologist at [facility]  Other chronic medical problems have been stable  Her immunosuppressive medications have been continued while in the hospital   Recommended again follow-up with transplant team for routine check up  Please see above list of diagnoses and related plan for additional information  Condition at Discharge: stable     Discharge Day Visit / Exam:     Subjective:  Patient seen and examined  No complaints at this time  Vitals: Blood Pressure: 123/72 (01/11/20 0747)  Pulse: 68 (01/11/20 0747)  Temperature: 98 4 °F (36 9 °C) (01/11/20 0747)  Temp Source: Oral (01/10/20 1547)  Respirations: 17 (01/11/20 0747)  Height: 5' 2" (157 5 cm) (01/10/20 0757)  Weight - Scale: 50 9 kg (112 lb 3 4 oz) (01/10/20 0757)  SpO2: 93 % (01/11/20 1204)  Exam:   Physical Exam   Constitutional: She is oriented to person, place, and time  She appears well-developed and well-nourished  HENT:   Head: Normocephalic and atraumatic  Eyes: Pupils are equal, round, and reactive to light  EOM are normal    Neck: Normal range of motion  Neck supple  Cardiovascular: Normal rate and regular rhythm  Pulmonary/Chest: Effort normal and breath sounds normal    Abdominal: Soft  Bowel sounds are normal    Musculoskeletal: Normal range of motion  Neurological: She is alert and oriented to person, place, and time  Discussion with Family:  Patient    Discharge instructions/Information to patient and family:   See after visit summary for information provided to patient and family  Provisions for Follow-Up Care:  See after visit summary for information related to follow-up care and any pertinent home health orders  Disposition:     Home      Planned Readmission:      Discharge Statement:  I spent 35 minutes discharging the patient  This time was spent on the day of discharge  I had direct contact with the patient on the day of discharge   Greater than 50% of the total time was spent examining patient, answering all patient questions, arranging and discussing plan of care with patient as well as directly providing post-discharge instructions  Additional time then spent on discharge activities  Discharge Medications:  See after visit summary for reconciled discharge medications provided to patient and family        ** Please Note: This note has been constructed using a voice recognition system **

## 2020-01-11 NOTE — ASSESSMENT & PLAN NOTE
· CT abd/pelvis w/ revealed "Mild right hydronephrosis secondary to 8 mm calculus at the right UPJ "    Status post top day 3 cystoscopy, retrograde pyelography, right ureteral stent placement    Normal renal function  No leukocytosis  Repeat CT scan  showed improvement in hydronephrosis    Continue fluid hydration bowel regimen and anti-inflammatory pain medication  Outpatient urology follow-up after discharge

## 2020-01-13 ENCOUNTER — TELEPHONE (OUTPATIENT)
Dept: UROLOGY | Facility: AMBULATORY SURGERY CENTER | Age: 45
End: 2020-01-13

## 2020-01-13 ENCOUNTER — PREP FOR PROCEDURE (OUTPATIENT)
Dept: UROLOGY | Facility: CLINIC | Age: 45
End: 2020-01-13

## 2020-01-13 DIAGNOSIS — N20.1 RIGHT URETERAL STONE: Primary | ICD-10-CM

## 2020-01-13 DIAGNOSIS — T86.819 COMPLICATION OF TRANSPLANTED LUNG, UNSPECIFIED COMPLICATION (HCC): ICD-10-CM

## 2020-01-13 RX ORDER — LEVOFLOXACIN 5 MG/ML
750 INJECTION, SOLUTION INTRAVENOUS ONCE
Status: CANCELLED | OUTPATIENT
Start: 2020-01-13 | End: 2020-01-13

## 2020-01-13 NOTE — UTILIZATION REVIEW
Notification of Discharge  This is a Notification of Discharge from our facility [address]  Please be advised that this patient has been discharge from our facility  Below you will find the admission and discharge date and time including the patients disposition  PRESENTATION DATE: 1/8/2020  8:12 PM  OBS ADMISSION DATE: [id number]  IP ADMISSION DATE: 1/9/20 1553   DISCHARGE DATE: 1/11/2020  4:53 PM  DISPOSITION: Home/Self Care Home/Self Care   Admission Orders listed below:  Admission Orders (From admission, onward)     Ordered        01/09/20 1553  Inpatient Admission  Once         01/09/20 0053  Place in Observation  Once                   Please contact the UR Department if additional information is required to close this patient's authorization/case  [facility] Utilization Review Department  Main: [phone] x carefully listen to the prompts  All voicemails are confidential   [email or web address]  Send all requests for admission clinical reviews, approved or denied determinations and any other requests to dedicated fax number below belonging to the campus where the patient is receiving treatment   List of dedicated fax numbers:  [address] DENIALS (Administrative/Medical Necessity) [phone]   [address] (Maternity/NICU/Pediatrics) [phone]   [facility] [phone]   Jean Marie Bentley [phone]   [facility] [phone]   [address] [facility] [address] [facility] [phone]   [facility]  [phone]   [address] [facility] [address]  [address] [facility][note 2] [address] [facility] [phone]

## 2020-01-13 NOTE — TELEPHONE ENCOUNTER
I will reach out to the patient's lung team  They were pretty clear they did not want her having surgery up here  I will clarify

## 2020-01-13 NOTE — TELEPHONE ENCOUNTER
Patient had surgery with Dr Brie Lazcano on 1/09/20 and has a stent in place   She has no follow up appointment for stent removal   Carrillo Ho from Lung transplant is calling to speak to nurse and set it up   [phone]

## 2020-01-13 NOTE — TELEPHONE ENCOUNTER
Patient is s/p CYSTOSCOPY RETROGRADE PYELOGRAM WITH INSERTION STENT URETERAL (Right Bladder) on 1/9/20 with Lanney Mohs at the [facility] office  Plan at that time per op note:    Plan-patient will maintain her Kim catheter and stent  She will be transferred to the [facility]  Urology consult should be obtained there to determine timing of ureteroscopy  Per hospital notes patient was discharged to home  Please advise of plan at this time  Thank you

## 2020-01-15 LAB
BACTERIA BLD CULT: NORMAL
BACTERIA BLD CULT: NORMAL

## 2020-01-22 ENCOUNTER — PREP FOR PROCEDURE (OUTPATIENT)
Dept: UROLOGY | Facility: CLINIC | Age: 45
End: 2020-01-22

## 2020-01-22 DIAGNOSIS — N20.1 RIGHT URETERAL STONE: Primary | ICD-10-CM

## 2020-01-22 DIAGNOSIS — Z94.2 LUNG TRANSPLANT RECIPIENT (HCC): ICD-10-CM

## 2020-01-22 RX ORDER — LEVOFLOXACIN 5 MG/ML
750 INJECTION, SOLUTION INTRAVENOUS ONCE
Status: CANCELLED | OUTPATIENT
Start: 2020-01-22 | End: 2020-01-22

## 2020-01-22 NOTE — TELEPHONE ENCOUNTER
Called and left message for patient that she will need a further procedure done not just a removal of the stent  In message stated that she will be getting a phone call from the surgery scheduler to schedule this

## 2020-01-22 NOTE — TELEPHONE ENCOUNTER
Spoke to lung transplant team, they want surgery taken care of locally  Patient cannot simply have stent removed, will require ureteroscopy  I placed the prep for procedure request for ureteroscopy 1/13  I'm including [facility] to see if that request ever went through  [place], let me know if I messed up the request and need to put in again      Thanks,  Z

## 2020-01-27 PROBLEM — N20.1 RIGHT URETERAL STONE: Status: ACTIVE | Noted: 2020-01-27

## 2020-01-27 NOTE — TELEPHONE ENCOUNTER
I spoke to patient and gave her a surgery date for 2/17 pending her clearance from [facility]

## 2020-01-31 NOTE — PROGRESS NOTES
Assessment and plan:       1  Obstructing ureteral stone status post stent placement  - Patient is scheduled for definitive ureteroscopy with Dr Della Hayden on 02/17/2020   - She has a history of cystic fibrosis and is status post bilateral lung transplant  We have received clearance from the [address] [place] [address] lung transplant team on 01/27/2020 for her to undergo this procedure  - Patient is significantly uncomfortable at today's visit  She has difficulty sitting still and speaking   - She denies any fever or chills  She is exquisitely tender to palpation at her right lower quadrant  She denies incontinence  She has previously undergone appendectomy  - She is likely proceeding to the emergency department for evaluation  She may require more urgent urologic intervention  Akhil Ortiz PA-C      Chief Complaint     Chief Complaint   Patient presents with    Obstructing Ureteral Stone Status Post Stent Placement         History of Present Illness     Eddie Washington is a 45 y o  female patient with a history of cystic fibrosis status post lung transplantation admitted [facility] on 01/10/2020 for right flank pain secondary to an 8mm right ureteral stone with resultant hydronephrosis  She was awaiting transfer to [address] [place] [address] but bed was not available and patient subsequently underwent right ureteral stent placement on 01/09/2020  We did receive medical clearance for the patient to undergo definitive ureteroscopy in the near future  This was provided by the lung transplant service at [facility] on 01/27/2020  This was scanned into her chart on 01/28/2020  Patient reports severe onset of new pain starting 01/30/2020  Prior to this she was tolerating the stent reasonably well with occasional hematuria    She denies any fevers or chills, diarrhea or constipation, she is not having any urinary incontinence to suggest that the stent is dislodged  She has no nausea or vomiting  She reports that the pain is starting in her right flank and radiating to her right lower quadrant  She is exquisitely painful to palpation at the right lower quadrant  She does not have her appendix or gallbladder  She was unable to urinate for specimen in the office today  Laboratory     Lab Results   Component Value Date    CREATININE 0 71 01/11/2020       No results found for: PSA    No results found for this or any previous visit (from the past 1 hour(s))  Review of Systems     Review of Systems   Constitutional: Negative for chills and fever  HENT: Negative  Eyes: Negative  Respiratory: Negative for shortness of breath  Cardiovascular: Negative for chest pain  Gastrointestinal: Positive for abdominal pain  Negative for constipation, diarrhea, nausea and vomiting  Genitourinary: Positive for flank pain and hematuria  Negative for difficulty urinating, dysuria, enuresis and urgency  Allergies     Allergies   Allergen Reactions    Vancomycin Angioedema and Hives    Ceftazidime Headache     Severe headaches after desensitization       Physical Exam     Physical Exam   Constitutional: She is oriented to person, place, and time  She appears well-developed and well-nourished  She appears distressed (Moderate)  HENT:   Head: Normocephalic and atraumatic  Right Ear: External ear normal    Left Ear: External ear normal    Nose: Nose normal    Eyes: Right eye exhibits no discharge  Left eye exhibits no discharge  No scleral icterus  Cardiovascular: Normal rate, regular rhythm, normal heart sounds and intact distal pulses  Exam reveals no gallop and no friction rub  No murmur heard  Blood pressure is elevated   Pulmonary/Chest: Effort normal  No stridor  No respiratory distress  She has wheezes  She has no rales  Abdominal: Soft  She exhibits no distension and no mass  There is tenderness ( right lower quadrant)   There is guarding  Genitourinary:   Genitourinary Comments: Positive for CVA tenderness on the right   Musculoskeletal:   Ambulates independently   Neurological: She is alert and oriented to person, place, and time  Skin: Skin is warm and dry  She is not diaphoretic  Psychiatric: She has a normal mood and affect  Her behavior is normal  Judgment and thought content normal    Nursing note and vitals reviewed          Vital Signs     Vitals:    02/03/20 1507   BP: (!) 140/102   BP Location: Left arm   Patient Position: Sitting   Cuff Size: Standard   Pulse: (!) 112   Weight: 49 9 kg (110 lb)   Height: 5' 2" (1 575 m)         Current Medications       Current Outpatient Medications:     albuterol (ACCUNEB) 1 25 MG/3ML nebulizer solution, Take 1 ampule by nebulization every 4 (four) hours as needed for wheezing, Disp: , Rfl:     alendronate (FOSAMAX) 70 mg tablet, Take 70 mg by mouth every 7 days, Disp: , Rfl:     ALPRAZolam (XANAX) 1 mg tablet, Take by mouth 2 (two) times a day as needed for anxiety, Disp: , Rfl:     atovaquone (MEPRON) 750 mg/5 mL suspension, Take 750 mg by mouth daily, Disp: , Rfl:     colistimethate (COLY-MYCIN M) inhalation solution, Take 150 mg by nebulization every 12 (twelve) hours, Disp: , Rfl:     dexlansoprazole (DEXILANT) 60 MG capsule, Take 60 mg by mouth daily, Disp: , Rfl:     dronabinol (MARINOL) 5 MG capsule, Take 5 mg by mouth 3 (three) times a day before meals, Disp: , Rfl:     ferrous sulfate 325 (65 Fe) mg tablet, Take 325 mg by mouth 2 (two) times a day with meals, Disp: , Rfl:     gabapentin (NEURONTIN) 100 mg capsule, Take 100 mg by mouth 2 (two) times a day as needed, Disp: , Rfl:     magnesium oxide (MAG-OX) 400 mg, Take 400 mg by mouth 2 (two) times a day, Disp: , Rfl:     multivitamin (THERAGRAN) TABS, Take 1 tablet by mouth daily, Disp: , Rfl:     oxyCODONE (ROXICODONE) 5 mg immediate release tablet, Take 1 tablet (5 mg total) by mouth every 8 (eight) hours as needed for moderate painMax Daily Amount: 15 mg, Disp: 15 tablet, Rfl: 0    pancrelipase, Lip-Prot-Amyl, (CREON) 24,000 units, Take 48,000 units of lipase by mouth 3 (three) times a day with meals , Disp: , Rfl:     predniSONE 10 mg tablet, Take by mouth daily, Disp: , Rfl:     tacrolimus (PROGRAF) 1 mg capsule, Take 2 mg by mouth every 12 (twelve) hours, Disp: , Rfl:     valGANciclovir (VALCYTE) 450 mg tablet, Take 900 mg by mouth daily with breakfast, Disp: , Rfl:     oxybutynin (DITROPAN XL) 15 MG 24 hr tablet, Take 1 tablet (15 mg total) by mouth daily for 7 days, Disp: 7 tablet, Rfl: 0      Active Problems     Patient Active Problem List   Diagnosis    Ureterolithiasis    [facility] of lung [facility] ([facility] )    Cystic fibrosis ([facility] )    Hyperglycemia    Obstruction of right ureteropelvic junction (UPJ) due to stone    Abnormal CT scan, colitis    Right ureteral stone         Past Medical History     Past Medical History:   Diagnosis Date    Asthma     Chronic pain     Cystic fibrosis ([facility] )     Transplant recipient          Surgical History     Past Surgical History:   Procedure Laterality Date    FL RETROGRADE PYELOGRAM  1/9/2020    LUNG SURGERY      MI CYSTOURETHROSCOPY,URETER CATHETER Right 1/9/2020    Procedure: CYSTOSCOPY RETROGRADE PYELOGRAM WITH INSERTION STENT URETERAL;  Surgeon: Oumou Rosenberg MD;  Location: [facility];  Service: Urology         Family History     Family History   Family history unknown: Yes         Social History     Social History       Radiology

## 2020-02-03 ENCOUNTER — APPOINTMENT (EMERGENCY)
Dept: CT IMAGING | Facility: HOSPITAL | Age: 45
End: 2020-02-03
Payer: COMMERCIAL

## 2020-02-03 ENCOUNTER — HOSPITAL ENCOUNTER (OUTPATIENT)
Facility: HOSPITAL | Age: 45
Setting detail: OBSERVATION
Discharge: HOME | End: 2020-02-05
Attending: EMERGENCY MEDICINE | Admitting: INTERNAL MEDICINE
Payer: COMMERCIAL

## 2020-02-03 ENCOUNTER — OFFICE VISIT (OUTPATIENT)
Dept: UROLOGY | Facility: CLINIC | Age: 45
End: 2020-02-03
Payer: COMMERCIAL

## 2020-02-03 VITALS
DIASTOLIC BLOOD PRESSURE: 102 MMHG | HEART RATE: 112 BPM | HEIGHT: 62 IN | BODY MASS INDEX: 20.24 KG/M2 | SYSTOLIC BLOOD PRESSURE: 140 MMHG | WEIGHT: 110 LBS

## 2020-02-03 DIAGNOSIS — N32.89 BLADDER SPASM: ICD-10-CM

## 2020-02-03 DIAGNOSIS — N20.1 URETEROLITHIASIS: Primary | ICD-10-CM

## 2020-02-03 DIAGNOSIS — N20.1 URETEROLITHIASIS: ICD-10-CM

## 2020-02-03 DIAGNOSIS — E86.0 DEHYDRATION: ICD-10-CM

## 2020-02-03 DIAGNOSIS — N20.1 OBSTRUCTION OF RIGHT URETEROPELVIC JUNCTION (UPJ) DUE TO STONE: Primary | ICD-10-CM

## 2020-02-03 LAB
ALBUMIN SERPL BCP-MCNC: 3.9 G/DL (ref 3.5–5)
ALP SERPL-CCNC: 90 U/L (ref 46–116)
ALT SERPL W P-5'-P-CCNC: 33 U/L (ref 12–78)
ANION GAP SERPL CALCULATED.3IONS-SCNC: 14 MMOL/L (ref 4–13)
AST SERPL W P-5'-P-CCNC: 15 U/L (ref 5–45)
BACTERIA UR QL AUTO: ABNORMAL /HPF
BASOPHILS # BLD AUTO: 0.02 THOUSANDS/ΜL (ref 0–0.1)
BASOPHILS NFR BLD AUTO: 0 % (ref 0–1)
BILIRUB SERPL-MCNC: 0.3 MG/DL (ref 0.2–1)
BILIRUB UR QL STRIP: NEGATIVE
BUN SERPL-MCNC: 26 MG/DL (ref 5–25)
CALCIUM SERPL-MCNC: 9.4 MG/DL (ref 8.3–10.1)
CHLORIDE SERPL-SCNC: 103 MMOL/L (ref 100–108)
CLARITY UR: CLEAR
CO2 SERPL-SCNC: 20 MMOL/L (ref 21–32)
COLOR UR: YELLOW
CREAT SERPL-MCNC: 1.1 MG/DL (ref 0.6–1.3)
CRYSTALS URNS QL MICRO: ABNORMAL /HPF
EOSINOPHIL # BLD AUTO: 0.05 THOUSAND/ΜL (ref 0–0.61)
EOSINOPHIL NFR BLD AUTO: 1 % (ref 0–6)
ERYTHROCYTE [DISTWIDTH] IN BLOOD BY AUTOMATED COUNT: 13.6 % (ref 11.6–15.1)
GFR SERPL CREATININE-BSD FRML MDRD: 62 ML/MIN/1.73SQ M
GLUCOSE SERPL-MCNC: 158 MG/DL (ref 65–140)
GLUCOSE UR STRIP-MCNC: ABNORMAL MG/DL
HCT VFR BLD AUTO: 39.1 % (ref 34.8–46.1)
HGB BLD-MCNC: 13.1 G/DL (ref 11.5–15.4)
HGB UR QL STRIP.AUTO: ABNORMAL
IMM GRANULOCYTES # BLD AUTO: 0.06 THOUSAND/UL (ref 0–0.2)
IMM GRANULOCYTES NFR BLD AUTO: 1 % (ref 0–2)
KETONES UR STRIP-MCNC: NEGATIVE MG/DL
LEUKOCYTE ESTERASE UR QL STRIP: ABNORMAL
LYMPHOCYTES # BLD AUTO: 1.45 THOUSANDS/ΜL (ref 0.6–4.47)
LYMPHOCYTES NFR BLD AUTO: 15 % (ref 14–44)
MCH RBC QN AUTO: 32 PG (ref 26.8–34.3)
MCHC RBC AUTO-ENTMCNC: 33.5 G/DL (ref 31.4–37.4)
MCV RBC AUTO: 95 FL (ref 82–98)
MONOCYTES # BLD AUTO: 0.32 THOUSAND/ΜL (ref 0.17–1.22)
MONOCYTES NFR BLD AUTO: 3 % (ref 4–12)
NEUTROPHILS # BLD AUTO: 7.62 THOUSANDS/ΜL (ref 1.85–7.62)
NEUTS SEG NFR BLD AUTO: 80 % (ref 43–75)
NITRITE UR QL STRIP: NEGATIVE
NON-SQ EPI CELLS URNS QL MICRO: ABNORMAL /HPF
NRBC BLD AUTO-RTO: 0 /100 WBCS
PH UR STRIP.AUTO: 6 [PH]
PLATELET # BLD AUTO: 181 THOUSANDS/UL (ref 149–390)
PMV BLD AUTO: 9.1 FL (ref 8.9–12.7)
POTASSIUM SERPL-SCNC: 4.1 MMOL/L (ref 3.5–5.3)
PROT SERPL-MCNC: 8.2 G/DL (ref 6.4–8.2)
PROT UR STRIP-MCNC: ABNORMAL MG/DL
RBC # BLD AUTO: 4.1 MILLION/UL (ref 3.81–5.12)
RBC #/AREA URNS AUTO: ABNORMAL /HPF
SODIUM SERPL-SCNC: 137 MMOL/L (ref 136–145)
SP GR UR STRIP.AUTO: 1.02 (ref 1–1.03)
UROBILINOGEN UR QL STRIP.AUTO: 0.2 E.U./DL
WBC # BLD AUTO: 9.52 THOUSAND/UL (ref 4.31–10.16)
WBC #/AREA URNS AUTO: ABNORMAL /HPF

## 2020-02-03 PROCEDURE — 99285 EMERGENCY DEPT VISIT HI MDM: CPT | Performed by: EMERGENCY MEDICINE

## 2020-02-03 PROCEDURE — 96375 TX/PRO/DX INJ NEW DRUG ADDON: CPT

## 2020-02-03 PROCEDURE — 81001 URINALYSIS AUTO W/SCOPE: CPT | Performed by: EMERGENCY MEDICINE

## 2020-02-03 PROCEDURE — 96365 THER/PROPH/DIAG IV INF INIT: CPT

## 2020-02-03 PROCEDURE — 96376 TX/PRO/DX INJ SAME DRUG ADON: CPT

## 2020-02-03 PROCEDURE — 99220 PR INITIAL OBSERVATION CARE/DAY 70 MINUTES: CPT | Performed by: INTERNAL MEDICINE

## 2020-02-03 PROCEDURE — 74176 CT ABD & PELVIS W/O CONTRAST: CPT

## 2020-02-03 PROCEDURE — 85025 COMPLETE CBC W/AUTO DIFF WBC: CPT | Performed by: EMERGENCY MEDICINE

## 2020-02-03 PROCEDURE — 80053 COMPREHEN METABOLIC PANEL: CPT | Performed by: EMERGENCY MEDICINE

## 2020-02-03 PROCEDURE — 99213 OFFICE O/P EST LOW 20 MIN: CPT | Performed by: PHYSICIAN ASSISTANT

## 2020-02-03 PROCEDURE — 99285 EMERGENCY DEPT VISIT HI MDM: CPT

## 2020-02-03 PROCEDURE — 36415 COLL VENOUS BLD VENIPUNCTURE: CPT | Performed by: EMERGENCY MEDICINE

## 2020-02-03 RX ORDER — TACROLIMUS 0.5 MG/1
2 CAPSULE ORAL EVERY 12 HOURS SCHEDULED
Status: DISCONTINUED | OUTPATIENT
Start: 2020-02-03 | End: 2020-02-05 | Stop reason: HOSPADM

## 2020-02-03 RX ORDER — ALPRAZOLAM 0.5 MG/1
1 TABLET ORAL 2 TIMES DAILY PRN
Status: DISCONTINUED | OUTPATIENT
Start: 2020-02-03 | End: 2020-02-05 | Stop reason: HOSPADM

## 2020-02-03 RX ORDER — PREDNISONE 10 MG/1
10 TABLET ORAL DAILY
Status: DISCONTINUED | OUTPATIENT
Start: 2020-02-04 | End: 2020-02-05 | Stop reason: HOSPADM

## 2020-02-03 RX ORDER — ATOVAQUONE 750 MG/5ML
750 SUSPENSION ORAL DAILY
Status: DISCONTINUED | OUTPATIENT
Start: 2020-02-04 | End: 2020-02-05 | Stop reason: HOSPADM

## 2020-02-03 RX ORDER — ONDANSETRON 2 MG/ML
4 INJECTION INTRAMUSCULAR; INTRAVENOUS ONCE
Status: COMPLETED | OUTPATIENT
Start: 2020-02-03 | End: 2020-02-03

## 2020-02-03 RX ORDER — DRONABINOL 2.5 MG/1
5 CAPSULE ORAL
Status: DISCONTINUED | OUTPATIENT
Start: 2020-02-04 | End: 2020-02-05 | Stop reason: HOSPADM

## 2020-02-03 RX ORDER — HYDROMORPHONE HCL/PF 1 MG/ML
0.5 SYRINGE (ML) INJECTION ONCE
Status: COMPLETED | OUTPATIENT
Start: 2020-02-03 | End: 2020-02-03

## 2020-02-03 RX ORDER — GABAPENTIN 100 MG/1
100 CAPSULE ORAL 2 TIMES DAILY PRN
Status: DISCONTINUED | OUTPATIENT
Start: 2020-02-03 | End: 2020-02-05 | Stop reason: HOSPADM

## 2020-02-03 RX ORDER — KETOROLAC TROMETHAMINE 30 MG/ML
15 INJECTION, SOLUTION INTRAMUSCULAR; INTRAVENOUS ONCE
Status: COMPLETED | OUTPATIENT
Start: 2020-02-03 | End: 2020-02-03

## 2020-02-03 RX ORDER — VALGANCICLOVIR 450 MG/1
900 TABLET, FILM COATED ORAL
Status: DISCONTINUED | OUTPATIENT
Start: 2020-02-04 | End: 2020-02-05 | Stop reason: HOSPADM

## 2020-02-03 RX ORDER — FERROUS SULFATE 325(65) MG
325 TABLET ORAL 2 TIMES DAILY WITH MEALS
Status: DISCONTINUED | OUTPATIENT
Start: 2020-02-04 | End: 2020-02-05 | Stop reason: HOSPADM

## 2020-02-03 RX ORDER — ALBUTEROL SULFATE 2.5 MG/3ML
1.25 SOLUTION RESPIRATORY (INHALATION) EVERY 4 HOURS PRN
Status: DISCONTINUED | OUTPATIENT
Start: 2020-02-03 | End: 2020-02-05 | Stop reason: HOSPADM

## 2020-02-03 RX ORDER — PANTOPRAZOLE SODIUM 40 MG/1
40 TABLET, DELAYED RELEASE ORAL DAILY
Status: DISCONTINUED | OUTPATIENT
Start: 2020-02-04 | End: 2020-02-05 | Stop reason: HOSPADM

## 2020-02-03 RX ADMIN — MAGNESIUM OXIDE TAB 400 MG (241.3 MG ELEMENTAL MG) 400 MG: 400 (241.3 MG) TAB at 23:39

## 2020-02-03 RX ADMIN — HYDROMORPHONE HYDROCHLORIDE 0.5 MG: 1 INJECTION, SOLUTION INTRAMUSCULAR; INTRAVENOUS; SUBCUTANEOUS at 19:07

## 2020-02-03 RX ADMIN — TACROLIMUS 2 MG: 0.5 CAPSULE ORAL at 23:39

## 2020-02-03 RX ADMIN — HYDROMORPHONE HYDROCHLORIDE 0.5 MG: 1 INJECTION, SOLUTION INTRAMUSCULAR; INTRAVENOUS; SUBCUTANEOUS at 21:19

## 2020-02-03 RX ADMIN — CEFTRIAXONE SODIUM 1000 MG: 10 INJECTION, POWDER, FOR SOLUTION INTRAVENOUS at 21:19

## 2020-02-03 RX ADMIN — ONDANSETRON 4 MG: 2 INJECTION INTRAMUSCULAR; INTRAVENOUS at 18:35

## 2020-02-03 RX ADMIN — KETOROLAC TROMETHAMINE 15 MG: 30 INJECTION, SOLUTION INTRAMUSCULAR at 18:35

## 2020-02-03 NOTE — ED PROVIDER NOTES
Pt Name: Ted Sorensen  MRN: [id number]  [place] 1975  Age/Sex: 45 y o  female  Date of evaluation: 2/3/2020  PCP: Harpreet aPcheco MD    [address] [place] [address]    Chief Complaint   Patient presents with    Flank Pain     pt has kidney stone dx 3 weeks ago, "I am having kidney stone pain" reports passing on stone but "theres more in there " reports R sided flank pain         HPI    45 y o  female presenting with right flank pain  Patient states she was diagnosed kidney stones 3 weeks ago, was previously treated and released, states the pain is grown worse over the past 5 days  She was given oxycodone outpatient but it did not control her pain well, states that she used more frequently than prescribed and is still having severe pain  Pain is sharp, severe, in the right flank, radiating up and down the back, unchanged with movement position or exertion  She denies fever, nausea, vomiting, diarrhea, trauma, other symptoms  Patient states she is scheduled for ureteroscopy to remove the stones in approximately 2 weeks but states she does not think she can make it that long      HPI      Past Medical and Surgical History    Past Medical History:   Diagnosis Date    Asthma     Chronic pain     Cystic fibrosis ([facility] [address] )     Transplant recipient        Past Surgical History:   Procedure Laterality Date    FL RETROGRADE PYELOGRAM  1/9/2020    LUNG SURGERY      MN CYSTOURETHROSCOPY,URETER CATHETER Right 1/9/2020    Procedure: CYSTOSCOPY RETROGRADE PYELOGRAM WITH INSERTION STENT URETERAL;  Surgeon: Hang Jason MD;  Location: [facility];  Service: Urology       Family History   Family history unknown: Yes       Social History     Tobacco Use    Smoking status: Never Smoker    Smokeless tobacco: Never Used   Substance Use Topics    Alcohol use: Yes     Frequency: 2-4 times a month     Comment: social    Drug use: Yes     Frequency: 2 0 times per week     Types: Marijuana     Comment: medical edible marijuana prescribed           Allergies    Allergies   Allergen Reactions    Vancomycin Angioedema and Hives    Ceftazidime Headache     Severe headaches after desensitization       Home Medications    Prior to Admission medications    Medication Sig Start Date End Date Taking?  Authorizing Provider   albuterol (ACCUNEB) 1 25 MG/3ML nebulizer solution Take 1 ampule by nebulization every 4 (four) hours as needed for wheezing    Historical Provider, MD   alendronate (FOSAMAX) 70 mg tablet Take 70 mg by mouth every 7 days    Historical Provider, MD   ALPRAZolam Layvonne Ala) 1 mg tablet Take by mouth 2 (two) times a day as needed for anxiety    Historical Provider, MD   atovaquone (MEPRON) 750 mg/5 mL suspension Take 750 mg by mouth daily    Historical Provider, MD   colistimethate (COLY-MYCIN M) inhalation solution Take 150 mg by nebulization every 12 (twelve) hours    Historical Provider, MD   dexlansoprazole (DEXILANT) 60 MG capsule Take 60 mg by mouth daily    Historical Provider, MD   dronabinol (MARINOL) 5 MG capsule Take 5 mg by mouth 3 (three) times a day before meals    Historical Provider, MD   ferrous sulfate 325 (65 Fe) mg tablet Take 325 mg by mouth 2 (two) times a day with meals    Historical Provider, MD   gabapentin (NEURONTIN) 100 mg capsule Take 100 mg by mouth 2 (two) times a day as needed    Historical Provider, MD   magnesium oxide (MAG-OX) 400 mg Take 400 mg by mouth 2 (two) times a day    Historical Provider, MD   multivitamin (THERAGRAN) TABS Take 1 tablet by mouth daily    Historical Provider, MD   oxybutynin (DITROPAN XL) 15 MG 24 hr tablet Take 1 tablet (15 mg total) by mouth daily for 7 days 1/11/20 1/18/20  WAYNE Barillas   oxyCODONE (ROXICODONE) 5 mg immediate release tablet Take 1 tablet (5 mg total) by mouth every 8 (eight) hours as needed for moderate painMax Daily Amount: 15 mg 1/11/20   Murray Velásquez MD   pancrelipase, Lip-Prot-Amyl, (CREON) 24,000 units Take 48,000 units of lipase by mouth 3 (three) times a day with meals     Historical Provider, MD   predniSONE 10 mg tablet Take by mouth daily    Historical Provider, MD   tacrolimus (PROGRAF) 1 mg capsule Take 2 mg by mouth every 12 (twelve) hours    Historical Provider, MD   valGANciclovir (VALCYTE) 450 mg tablet Take 900 mg by mouth daily with breakfast    Historical Provider, MD           Review of Systems    Review of Systems   Constitutional: Negative for activity change, chills and fever  HENT: Negative for drooling and facial swelling  Eyes: Negative for pain, discharge and visual disturbance  Respiratory: Negative for apnea, cough, chest tightness, shortness of breath and wheezing  Cardiovascular: Negative for chest pain and leg swelling  Gastrointestinal: Positive for nausea  Negative for abdominal pain, constipation, diarrhea and vomiting  Genitourinary: Positive for dysuria and flank pain  Negative for difficulty urinating and urgency  Musculoskeletal: Negative for arthralgias, back pain and gait problem  Skin: Negative for color change and rash  Neurological: Negative for dizziness, speech difficulty, weakness and headaches  Psychiatric/Behavioral: Negative for agitation, behavioral problems and confusion  All other systems reviewed and negative  Physical Exam      ED Triage Vitals   Temperature Pulse Respirations Blood Pressure SpO2   02/03/20 1628 02/03/20 1628 02/03/20 1628 02/03/20 1628 02/03/20 1628   99 3 °F (37 4 °C) (!) 115 18 (!) 186/99 93 %      Temp Source Heart Rate Source Patient Position - Orthostatic VS BP Location FiO2 (%)   02/03/20 1628 02/03/20 1628 02/03/20 1628 02/03/20 1628 --   Oral Monitor Sitting Left arm       Pain Score       02/03/20 2000       2               Physical Exam   Constitutional: She is oriented to person, place, and time  She appears well-developed and well-nourished  She appears distressed  HENT:   Head: Normocephalic and atraumatic     Eyes: Pupils are equal, round, and reactive to light  Conjunctivae and EOM are normal    Neck: Normal range of motion  Neck supple  Cardiovascular: Normal rate, regular rhythm, normal heart sounds and intact distal pulses  Pulmonary/Chest: Effort normal and breath sounds normal  No respiratory distress  She has no wheezes  She has no rales  Abdominal: Soft  She exhibits no distension  There is no tenderness  There is no rebound and no guarding  No abdominal tenderness to palpation, exquisite right-sided CVA tenderness   Musculoskeletal: Normal range of motion  She exhibits no edema or deformity  Neurological: She is alert and oriented to person, place, and time  Skin: Skin is warm and dry  No rash noted  No erythema  Psychiatric: She has a normal mood and affect  Her behavior is normal  Judgment and thought content normal    Nursing note and vitals reviewed             Diagnostic Results      Labs:    Results Reviewed     Procedure Component Value Units Date/Time    Comprehensive metabolic panel [[id number]]  (Abnormal) Collected:  02/03/20 1830    Lab Status:  Final result Specimen:  Blood from Arm, Left Updated:  02/03/20 1856     Sodium 137 mmol/L      Potassium 4 1 mmol/L      Chloride 103 mmol/L      CO2 20 mmol/L      ANION GAP 14 mmol/L      BUN 26 mg/dL      Creatinine 1 10 mg/dL      Glucose 158 mg/dL      Calcium 9 4 mg/dL      AST 15 U/L      ALT 33 U/L      Alkaline Phosphatase 90 U/L      Total Protein 8 2 g/dL      Albumin 3 9 g/dL      Total Bilirubin 0 30 mg/dL      eGFR 62 ml/min/1 73sq m     Narrative:       [place] guidelines for Chronic Kidney Disease (CKD):     Stage 1 with normal or high GFR (GFR > 90 mL/min/1 73 square meters)    Stage 2 Mild CKD (GFR = 60-89 mL/min/1 73 square meters)    Stage 3A Moderate CKD (GFR = 45-59 mL/min/1 73 square meters)    Stage 3B Moderate CKD (GFR = 30-44 mL/min/1 73 square meters)    Stage 4 Severe CKD (GFR = 15-29 mL/min/1 73 square meters)    Stage 5 End Stage CKD (GFR <15 mL/min/1 73 square meters)  Note: GFR calculation is accurate only with a steady state creatinine    CBC and differential [[id number]]  (Abnormal) Collected:  02/03/20 1830    Lab Status:  Final result Specimen:  Blood from Arm, Left Updated:  02/03/20 1838     WBC 9 52 Thousand/uL      RBC 4 10 Million/uL      Hemoglobin 13 1 g/dL      Hematocrit 39 1 %      MCV 95 fL      MCH 32 0 pg      MCHC 33 5 g/dL      RDW 13 6 %      MPV 9 1 fL      Platelets 943 Thousands/uL      nRBC 0 /100 WBCs      Neutrophils Relative 80 %      Immat GRANS % 1 %      Lymphocytes Relative 15 %      Monocytes Relative 3 %      Eosinophils Relative 1 %      Basophils Relative 0 %      Neutrophils Absolute 7 62 Thousands/µL      Immature Grans Absolute 0 06 Thousand/uL      Lymphocytes Absolute 1 45 Thousands/µL      Monocytes Absolute 0 32 Thousand/µL      Eosinophils Absolute 0 05 Thousand/µL      Basophils Absolute 0 02 Thousands/µL     Urine Microscopic [[id number]]  (Abnormal) Collected:  02/03/20 1732    Lab Status:  Final result Specimen:  Urine, Clean Catch Updated:  02/03/20 1830     RBC, UA 10-20 /hpf      WBC, UA 4-10 /hpf      Epithelial Cells Occasional /hpf      Bacteria, UA Occasional /hpf      OTHER CRYSTALS Cysteine /hpf     UA w Reflex to Microscopic w Reflex to Culture [[id number]]  (Abnormal) Collected:  02/03/20 1732    Lab Status:  Final result Specimen:  Urine, Clean Catch Updated:  02/03/20 1755     Color, UA Yellow     Clarity, UA Clear     Specific [place], UA 1 020     pH, UA 6 0     Leukocytes, UA Small     Nitrite, UA Negative     Protein,  (2+) mg/dl      Glucose,  (1/4%) mg/dl      Ketones, UA Negative mg/dl      Urobilinogen, UA 0 2 E U /dl      Bilirubin, UA Negative     Blood, UA Large          All labs reviewed and utilized in the medical decision making process    Radiology:    CT renal stone study abdomen pelvis wo contrast   Final Result         1  No hydronephrosis  2   Double-J stent right renal collecting system redemonstrated  There is mild ureteral wall thickening and periureteral haziness possibly related to postoperative changes  Superimposed urinary tract infection/ureteritis not excluded  Further clinical    assessment recommended  3   Bilateral intrarenal calculi, nonobstructing, increased in size on the right  4   Constipation  Workstation performed: [id number]             All radiology studies independently viewed by me and interpreted by the radiologist     Procedure    Procedures        ED Course of Care and Re-Assessments      Symptoms improved with Toradol as well as hydromorphone  After labs and imaging resulted, discussed case with Phill Cunningham of urology who recommended admitting the patient for pain control and to facilitate placing her on the OR schedule tomorrow afternoon for more definitive care  Low suspicion for infection but not ruled out at this time, started on ceftriaxone  Medications   ondansetron (ZOFRAN) injection 4 mg (4 mg Intravenous Given 2/3/20 1835)   HYDROmorphone (DILAUDID) injection 0 5 mg (0 5 mg Intravenous Given 2/3/20 1907)   ketorolac (TORADOL) injection 15 mg (15 mg Intravenous Given 2/3/20 1835)   ceftriaxone (ROCEPHIN) 1 g/50 mL in dextrose IVPB (0 mg Intravenous Stopped 2/3/20 2152)   HYDROmorphone (DILAUDID) injection 0 5 mg (0 5 mg Intravenous Given 2/3/20 2119)           FINAL IMPRESSION    Final diagnoses:   Ureterolithiasis   Dehydration         DISPOSITION/PLAN    Presentation above most consistent with pain from ureterolithiasis or stent, lower suspicion for infection as above  Initial workup and resuscitation in ER, admitted to Internal Medicine with Urology consulting at request of Urology team to facilitate expedited surgical treatment due to failure of outpatient treatment  Hemodynamically stable and comfortable at time of admit    Time reflects when diagnosis was documented in both MDM as applicable and the Disposition within this note     Time User Action Codes Description Comment    2/3/2020  9:11 PM Lloyd Hightower [N20 1] Ureterolithiasis     2/3/2020  9:11 PM Hang Dawson [N20 1] Ureterolithiasis     2/3/2020  9:44 PM Keith Langston [E86 0] Dehydration       ED Disposition     ED Disposition Condition Date/Time Comment    Admit Stable Mon Feb 3, 2020  9:44 PM Case was discussed with KORY and the patient's admission status was agreed to be Admission Status: observation status to the service of Dr Leanna Hurt   Follow-up Information    None           PATIENT REFERRED TO:    No follow-up provider specified  DISCHARGE MEDICATIONS:    Patient's Medications   Discharge Prescriptions    No medications on file       No discharge procedures on file           MD Jacoby Ramos MD  02/03/20 0635

## 2020-02-04 ENCOUNTER — ANESTHESIA EVENT (OUTPATIENT)
Dept: PERIOP | Facility: HOSPITAL | Age: 45
End: 2020-02-04
Payer: COMMERCIAL

## 2020-02-04 ENCOUNTER — ANESTHESIA (OUTPATIENT)
Dept: PERIOP | Facility: HOSPITAL | Age: 45
End: 2020-02-04
Payer: COMMERCIAL

## 2020-02-04 ENCOUNTER — TELEPHONE (OUTPATIENT)
Dept: UROLOGY | Facility: CLINIC | Age: 45
End: 2020-02-04

## 2020-02-04 ENCOUNTER — APPOINTMENT (OUTPATIENT)
Dept: RADIOLOGY | Facility: HOSPITAL | Age: 45
End: 2020-02-04
Payer: COMMERCIAL

## 2020-02-04 DIAGNOSIS — N20.1 URETEROLITHIASIS: Primary | ICD-10-CM

## 2020-02-04 PROBLEM — R11.2 NAUSEA AND VOMITING: Status: ACTIVE | Noted: 2020-02-04

## 2020-02-04 LAB
ANION GAP SERPL CALCULATED.3IONS-SCNC: 10 MMOL/L (ref 4–13)
BUN SERPL-MCNC: 26 MG/DL (ref 5–25)
CALCIUM SERPL-MCNC: 8.9 MG/DL (ref 8.3–10.1)
CHLORIDE SERPL-SCNC: 104 MMOL/L (ref 100–108)
CO2 SERPL-SCNC: 25 MMOL/L (ref 21–32)
CREAT SERPL-MCNC: 0.97 MG/DL (ref 0.6–1.3)
ERYTHROCYTE [DISTWIDTH] IN BLOOD BY AUTOMATED COUNT: 13.7 % (ref 11.6–15.1)
EST. AVERAGE GLUCOSE BLD GHB EST-MCNC: 169 MG/DL
GFR SERPL CREATININE-BSD FRML MDRD: 72 ML/MIN/1.73SQ M
GLUCOSE P FAST SERPL-MCNC: 123 MG/DL (ref 65–99)
GLUCOSE SERPL-MCNC: 112 MG/DL (ref 65–140)
GLUCOSE SERPL-MCNC: 123 MG/DL (ref 65–140)
GLUCOSE SERPL-MCNC: 201 MG/DL (ref 65–140)
HBA1C MFR BLD: 7.5 % (ref 4.2–6.3)
HCT VFR BLD AUTO: 39.7 % (ref 34.8–46.1)
HGB BLD-MCNC: 13 G/DL (ref 11.5–15.4)
MCH RBC QN AUTO: 31.5 PG (ref 26.8–34.3)
MCHC RBC AUTO-ENTMCNC: 32.7 G/DL (ref 31.4–37.4)
MCV RBC AUTO: 96 FL (ref 82–98)
PLATELET # BLD AUTO: 174 THOUSANDS/UL (ref 149–390)
PMV BLD AUTO: 9 FL (ref 8.9–12.7)
POTASSIUM SERPL-SCNC: 4 MMOL/L (ref 3.5–5.3)
RBC # BLD AUTO: 4.13 MILLION/UL (ref 3.81–5.12)
SODIUM SERPL-SCNC: 139 MMOL/L (ref 136–145)
WBC # BLD AUTO: 6.72 THOUSAND/UL (ref 4.31–10.16)

## 2020-02-04 PROCEDURE — 82360 CALCULUS ASSAY QUANT: CPT | Performed by: UROLOGY

## 2020-02-04 PROCEDURE — C1758 CATHETER, URETERAL: HCPCS | Performed by: UROLOGY

## 2020-02-04 PROCEDURE — 99225 PR SBSQ OBSERVATION CARE/DAY 25 MINUTES: CPT | Performed by: NURSE PRACTITIONER

## 2020-02-04 PROCEDURE — C2617 STENT, NON-COR, TEM W/O DEL: HCPCS | Performed by: UROLOGY

## 2020-02-04 PROCEDURE — 85027 COMPLETE CBC AUTOMATED: CPT | Performed by: INTERNAL MEDICINE

## 2020-02-04 PROCEDURE — 36415 COLL VENOUS BLD VENIPUNCTURE: CPT | Performed by: INTERNAL MEDICINE

## 2020-02-04 PROCEDURE — C1769 GUIDE WIRE: HCPCS | Performed by: UROLOGY

## 2020-02-04 PROCEDURE — 99245 OFF/OP CONSLTJ NEW/EST HI 55: CPT | Performed by: UROLOGY

## 2020-02-04 PROCEDURE — 83036 HEMOGLOBIN GLYCOSYLATED A1C: CPT | Performed by: INTERNAL MEDICINE

## 2020-02-04 PROCEDURE — 80048 BASIC METABOLIC PNL TOTAL CA: CPT | Performed by: INTERNAL MEDICINE

## 2020-02-04 PROCEDURE — 74420 UROGRAPHY RTRGR +-KUB: CPT

## 2020-02-04 PROCEDURE — 87086 URINE CULTURE/COLONY COUNT: CPT | Performed by: UROLOGY

## 2020-02-04 PROCEDURE — 82948 REAGENT STRIP/BLOOD GLUCOSE: CPT

## 2020-02-04 PROCEDURE — 52356 CYSTO/URETERO W/LITHOTRIPSY: CPT | Performed by: UROLOGY

## 2020-02-04 PROCEDURE — 87186 SC STD MICRODIL/AGAR DIL: CPT | Performed by: UROLOGY

## 2020-02-04 DEVICE — STENT URETERAL 6FR 24CML INLAY OPTIMA: Type: IMPLANTABLE DEVICE | Site: URINARY BLADDER | Status: FUNCTIONAL

## 2020-02-04 RX ORDER — HYDROMORPHONE HCL/PF 1 MG/ML
0.5 SYRINGE (ML) INJECTION
Status: DISCONTINUED | OUTPATIENT
Start: 2020-02-04 | End: 2020-02-05 | Stop reason: HOSPADM

## 2020-02-04 RX ORDER — MAGNESIUM HYDROXIDE 1200 MG/15ML
LIQUID ORAL AS NEEDED
Status: DISCONTINUED | OUTPATIENT
Start: 2020-02-04 | End: 2020-02-04 | Stop reason: HOSPADM

## 2020-02-04 RX ORDER — HEPARIN SODIUM 5000 [USP'U]/ML
5000 INJECTION, SOLUTION INTRAVENOUS; SUBCUTANEOUS EVERY 8 HOURS SCHEDULED
Status: DISCONTINUED | OUTPATIENT
Start: 2020-02-04 | End: 2020-02-05 | Stop reason: HOSPADM

## 2020-02-04 RX ORDER — PROMETHAZINE HYDROCHLORIDE 25 MG/ML
12.5 INJECTION, SOLUTION INTRAMUSCULAR; INTRAVENOUS ONCE
Status: COMPLETED | OUTPATIENT
Start: 2020-02-04 | End: 2020-02-04

## 2020-02-04 RX ORDER — DIPHENHYDRAMINE HYDROCHLORIDE 50 MG/ML
INJECTION INTRAMUSCULAR; INTRAVENOUS AS NEEDED
Status: DISCONTINUED | OUTPATIENT
Start: 2020-02-04 | End: 2020-02-04 | Stop reason: SURG

## 2020-02-04 RX ORDER — LIDOCAINE HYDROCHLORIDE 20 MG/ML
JELLY TOPICAL AS NEEDED
Status: DISCONTINUED | OUTPATIENT
Start: 2020-02-04 | End: 2020-02-04 | Stop reason: HOSPADM

## 2020-02-04 RX ORDER — ALBUTEROL SULFATE 2.5 MG/3ML
2.5 SOLUTION RESPIRATORY (INHALATION) ONCE
Status: DISCONTINUED | OUTPATIENT
Start: 2020-02-04 | End: 2020-02-04 | Stop reason: HOSPADM

## 2020-02-04 RX ORDER — PHENAZOPYRIDINE HYDROCHLORIDE 100 MG/1
100 TABLET, FILM COATED ORAL
Status: DISCONTINUED | OUTPATIENT
Start: 2020-02-04 | End: 2020-02-05 | Stop reason: HOSPADM

## 2020-02-04 RX ORDER — FENTANYL CITRATE/PF 50 MCG/ML
50 SYRINGE (ML) INJECTION
Status: COMPLETED | OUTPATIENT
Start: 2020-02-04 | End: 2020-02-04

## 2020-02-04 RX ORDER — ONDANSETRON 2 MG/ML
4 INJECTION INTRAMUSCULAR; INTRAVENOUS EVERY 6 HOURS PRN
Status: DISCONTINUED | OUTPATIENT
Start: 2020-02-04 | End: 2020-02-05 | Stop reason: HOSPADM

## 2020-02-04 RX ORDER — TAMSULOSIN HYDROCHLORIDE 0.4 MG/1
0.4 CAPSULE ORAL
Status: DISCONTINUED | OUTPATIENT
Start: 2020-02-04 | End: 2020-02-05 | Stop reason: HOSPADM

## 2020-02-04 RX ORDER — ONDANSETRON 2 MG/ML
INJECTION INTRAMUSCULAR; INTRAVENOUS AS NEEDED
Status: DISCONTINUED | OUTPATIENT
Start: 2020-02-04 | End: 2020-02-04 | Stop reason: SURG

## 2020-02-04 RX ORDER — DEXAMETHASONE SODIUM PHOSPHATE 10 MG/ML
INJECTION, SOLUTION INTRAMUSCULAR; INTRAVENOUS AS NEEDED
Status: DISCONTINUED | OUTPATIENT
Start: 2020-02-04 | End: 2020-02-04 | Stop reason: SURG

## 2020-02-04 RX ORDER — SODIUM CHLORIDE, SODIUM LACTATE, POTASSIUM CHLORIDE, CALCIUM CHLORIDE 600; 310; 30; 20 MG/100ML; MG/100ML; MG/100ML; MG/100ML
100 INJECTION, SOLUTION INTRAVENOUS CONTINUOUS
Status: DISPENSED | OUTPATIENT
Start: 2020-02-04 | End: 2020-02-04

## 2020-02-04 RX ORDER — LEVOFLOXACIN 5 MG/ML
500 INJECTION, SOLUTION INTRAVENOUS ONCE
Status: COMPLETED | OUTPATIENT
Start: 2020-02-04 | End: 2020-02-04

## 2020-02-04 RX ORDER — HEPARIN SODIUM 5000 [USP'U]/ML
5000 INJECTION, SOLUTION INTRAVENOUS; SUBCUTANEOUS EVERY 8 HOURS SCHEDULED
Status: DISCONTINUED | OUTPATIENT
Start: 2020-02-04 | End: 2020-02-04

## 2020-02-04 RX ORDER — PROPOFOL 10 MG/ML
INJECTION, EMULSION INTRAVENOUS AS NEEDED
Status: DISCONTINUED | OUTPATIENT
Start: 2020-02-04 | End: 2020-02-04 | Stop reason: SURG

## 2020-02-04 RX ORDER — PROMETHAZINE HYDROCHLORIDE 25 MG/ML
12.5 INJECTION, SOLUTION INTRAMUSCULAR; INTRAVENOUS ONCE AS NEEDED
Status: DISCONTINUED | OUTPATIENT
Start: 2020-02-04 | End: 2020-02-04 | Stop reason: HOSPADM

## 2020-02-04 RX ORDER — DEXMEDETOMIDINE HYDROCHLORIDE 100 UG/ML
INJECTION, SOLUTION INTRAVENOUS AS NEEDED
Status: DISCONTINUED | OUTPATIENT
Start: 2020-02-04 | End: 2020-02-04 | Stop reason: SURG

## 2020-02-04 RX ORDER — OXYCODONE HYDROCHLORIDE 5 MG/1
5 TABLET ORAL EVERY 4 HOURS PRN
Status: DISCONTINUED | OUTPATIENT
Start: 2020-02-04 | End: 2020-02-05 | Stop reason: HOSPADM

## 2020-02-04 RX ORDER — OXYCODONE HYDROCHLORIDE 10 MG/1
10 TABLET ORAL EVERY 4 HOURS PRN
Status: DISCONTINUED | OUTPATIENT
Start: 2020-02-04 | End: 2020-02-05 | Stop reason: HOSPADM

## 2020-02-04 RX ORDER — SUCCINYLCHOLINE/SOD CL,ISO/PF 100 MG/5ML
SYRINGE (ML) INTRAVENOUS AS NEEDED
Status: DISCONTINUED | OUTPATIENT
Start: 2020-02-04 | End: 2020-02-04 | Stop reason: SURG

## 2020-02-04 RX ORDER — MIDAZOLAM HYDROCHLORIDE 2 MG/2ML
INJECTION, SOLUTION INTRAMUSCULAR; INTRAVENOUS AS NEEDED
Status: DISCONTINUED | OUTPATIENT
Start: 2020-02-04 | End: 2020-02-04 | Stop reason: SURG

## 2020-02-04 RX ORDER — ACETAMINOPHEN 325 MG/1
650 TABLET ORAL EVERY 6 HOURS PRN
Status: DISCONTINUED | OUTPATIENT
Start: 2020-02-04 | End: 2020-02-05 | Stop reason: HOSPADM

## 2020-02-04 RX ORDER — METOCLOPRAMIDE HYDROCHLORIDE 5 MG/ML
INJECTION INTRAMUSCULAR; INTRAVENOUS AS NEEDED
Status: DISCONTINUED | OUTPATIENT
Start: 2020-02-04 | End: 2020-02-04 | Stop reason: SURG

## 2020-02-04 RX ORDER — LIDOCAINE HYDROCHLORIDE 20 MG/ML
INJECTION, SOLUTION EPIDURAL; INFILTRATION; INTRACAUDAL; PERINEURAL AS NEEDED
Status: DISCONTINUED | OUTPATIENT
Start: 2020-02-04 | End: 2020-02-04 | Stop reason: SURG

## 2020-02-04 RX ADMIN — TACROLIMUS 2 MG: 0.5 CAPSULE ORAL at 11:31

## 2020-02-04 RX ADMIN — HEPARIN SODIUM 5000 UNITS: 5000 INJECTION INTRAVENOUS; SUBCUTANEOUS at 00:51

## 2020-02-04 RX ADMIN — METOCLOPRAMIDE HYDROCHLORIDE 10 MG: 5 INJECTION INTRAMUSCULAR; INTRAVENOUS at 13:11

## 2020-02-04 RX ADMIN — PREDNISONE 10 MG: 10 TABLET ORAL at 11:39

## 2020-02-04 RX ADMIN — PHENYLEPHRINE HYDROCHLORIDE 100 MCG: 10 INJECTION INTRAVENOUS at 13:36

## 2020-02-04 RX ADMIN — ONDANSETRON 4 MG: 2 INJECTION INTRAMUSCULAR; INTRAVENOUS at 14:21

## 2020-02-04 RX ADMIN — PROPOFOL 20 MG: 10 INJECTION, EMULSION INTRAVENOUS at 13:53

## 2020-02-04 RX ADMIN — FERROUS SULFATE TAB 325 MG (65 MG ELEMENTAL FE) 325 MG: 325 (65 FE) TAB at 16:30

## 2020-02-04 RX ADMIN — PHENAZOPYRIDINE 100 MG: 100 TABLET ORAL at 16:30

## 2020-02-04 RX ADMIN — VALGANCICLOVIR 900 MG: 450 TABLET, FILM COATED ORAL at 11:33

## 2020-02-04 RX ADMIN — LEVOFLOXACIN: 5 INJECTION, SOLUTION INTRAVENOUS at 13:19

## 2020-02-04 RX ADMIN — DIPHENHYDRAMINE HYDROCHLORIDE 12.5 MG: 50 INJECTION, SOLUTION INTRAMUSCULAR; INTRAVENOUS at 13:11

## 2020-02-04 RX ADMIN — ONDANSETRON 4 MG: 2 INJECTION INTRAMUSCULAR; INTRAVENOUS at 13:11

## 2020-02-04 RX ADMIN — PHENYLEPHRINE HYDROCHLORIDE 100 MCG: 10 INJECTION INTRAVENOUS at 14:02

## 2020-02-04 RX ADMIN — PANCRELIPASE 48000 UNITS: 24000; 76000; 120000 CAPSULE, DELAYED RELEASE PELLETS ORAL at 20:14

## 2020-02-04 RX ADMIN — SODIUM CHLORIDE, SODIUM LACTATE, POTASSIUM CHLORIDE, AND CALCIUM CHLORIDE: .6; .31; .03; .02 INJECTION, SOLUTION INTRAVENOUS at 13:05

## 2020-02-04 RX ADMIN — Medication 100 MG: at 13:31

## 2020-02-04 RX ADMIN — HYDROMORPHONE HYDROCHLORIDE 0.5 MG: 1 INJECTION, SOLUTION INTRAMUSCULAR; INTRAVENOUS; SUBCUTANEOUS at 11:41

## 2020-02-04 RX ADMIN — TAMSULOSIN HYDROCHLORIDE 0.4 MG: 0.4 CAPSULE ORAL at 16:30

## 2020-02-04 RX ADMIN — TACROLIMUS 2 MG: 0.5 CAPSULE ORAL at 20:15

## 2020-02-04 RX ADMIN — FENTANYL CITRATE 50 MCG: 50 INJECTION, SOLUTION INTRAMUSCULAR; INTRAVENOUS at 14:48

## 2020-02-04 RX ADMIN — DEXMEDETOMIDINE HCL 4 MCG: 100 INJECTION INTRAVENOUS at 14:21

## 2020-02-04 RX ADMIN — HYDROMORPHONE HYDROCHLORIDE 0.5 MG: 1 INJECTION, SOLUTION INTRAMUSCULAR; INTRAVENOUS; SUBCUTANEOUS at 20:35

## 2020-02-04 RX ADMIN — ALPRAZOLAM 1 MG: 0.5 TABLET ORAL at 20:14

## 2020-02-04 RX ADMIN — HYDROMORPHONE HYDROCHLORIDE 0.5 MG: 1 INJECTION, SOLUTION INTRAMUSCULAR; INTRAVENOUS; SUBCUTANEOUS at 07:50

## 2020-02-04 RX ADMIN — DEXAMETHASONE SODIUM PHOSPHATE 4 MG: 10 INJECTION, SOLUTION INTRAMUSCULAR; INTRAVENOUS at 13:31

## 2020-02-04 RX ADMIN — PROPOFOL 130 MG: 10 INJECTION, EMULSION INTRAVENOUS at 13:31

## 2020-02-04 RX ADMIN — PROPOFOL 30 MG: 10 INJECTION, EMULSION INTRAVENOUS at 13:50

## 2020-02-04 RX ADMIN — DRONABINOL 5 MG: 2.5 CAPSULE ORAL at 16:30

## 2020-02-04 RX ADMIN — FENTANYL CITRATE 50 MCG: 50 INJECTION, SOLUTION INTRAMUSCULAR; INTRAVENOUS at 15:49

## 2020-02-04 RX ADMIN — FENTANYL CITRATE 50 MCG: 50 INJECTION, SOLUTION INTRAMUSCULAR; INTRAVENOUS at 15:05

## 2020-02-04 RX ADMIN — FENTANYL CITRATE 50 MCG: 50 INJECTION, SOLUTION INTRAMUSCULAR; INTRAVENOUS at 17:04

## 2020-02-04 RX ADMIN — MIDAZOLAM HYDROCHLORIDE 2 MG: 1 INJECTION, SOLUTION INTRAMUSCULAR; INTRAVENOUS at 13:23

## 2020-02-04 RX ADMIN — SODIUM CHLORIDE, SODIUM LACTATE, POTASSIUM CHLORIDE, AND CALCIUM CHLORIDE 100 ML/HR: .6; .31; .03; .02 INJECTION, SOLUTION INTRAVENOUS at 00:52

## 2020-02-04 RX ADMIN — MAGNESIUM OXIDE TAB 400 MG (241.3 MG ELEMENTAL MG) 400 MG: 400 (241.3 MG) TAB at 18:36

## 2020-02-04 RX ADMIN — HEPARIN SODIUM 5000 UNITS: 5000 INJECTION INTRAVENOUS; SUBCUTANEOUS at 23:07

## 2020-02-04 RX ADMIN — ONDANSETRON 4 MG: 2 INJECTION INTRAMUSCULAR; INTRAVENOUS at 13:46

## 2020-02-04 RX ADMIN — HEPARIN SODIUM 5000 UNITS: 5000 INJECTION INTRAVENOUS; SUBCUTANEOUS at 09:17

## 2020-02-04 RX ADMIN — ONDANSETRON 4 MG: 2 INJECTION INTRAMUSCULAR; INTRAVENOUS at 07:44

## 2020-02-04 RX ADMIN — OXYCODONE HYDROCHLORIDE 5 MG: 5 TABLET ORAL at 04:55

## 2020-02-04 RX ADMIN — DEXMEDETOMIDINE HCL 12 MCG: 100 INJECTION INTRAVENOUS at 13:47

## 2020-02-04 RX ADMIN — LIDOCAINE HYDROCHLORIDE 100 MG: 20 INJECTION, SOLUTION EPIDURAL; INFILTRATION; INTRACAUDAL; PERINEURAL at 13:31

## 2020-02-04 RX ADMIN — OXYCODONE HYDROCHLORIDE 5 MG: 5 TABLET ORAL at 23:15

## 2020-02-04 RX ADMIN — HYDROMORPHONE HYDROCHLORIDE 0.5 MG: 1 INJECTION, SOLUTION INTRAMUSCULAR; INTRAVENOUS; SUBCUTANEOUS at 00:46

## 2020-02-04 RX ADMIN — PROMETHAZINE HYDROCHLORIDE 12.5 MG: 25 INJECTION INTRAMUSCULAR; INTRAVENOUS at 10:09

## 2020-02-04 RX ADMIN — PANTOPRAZOLE SODIUM 40 MG: 40 TABLET, DELAYED RELEASE ORAL at 11:39

## 2020-02-04 NOTE — ED NOTES
1  CC-flank pain   transplant PT    2  Is this admission due to an injury? no    3  Orientation status A&Ox3    4  Abnormal labs/ focused assessment/ vitals CT    5  Medications/ drips [email or web address]    6  Narcotic time/ pain medications-none    7  IV lines/drains/ etc - 22G L F/A    8  Isolation status-none    9  Skin-intact    10  Ambulation status-independant    11   ED phone number [phone]     Lennox Gals, RN  02/04/20 8298

## 2020-02-04 NOTE — UTILIZATION REVIEW
Initial Clinical Review    Admission: Date/Time/Statement: OBS 2/3 2215  Orders Placed This Encounter   Procedures    Place in Observation     Standing Status:   Standing     Number of Occurrences:   1     Order Specific Question:   Admitting Physician     Answer:   Glory Pearce [[id number]]     Order Specific Question:   Level of Care     Answer:   Med Surg [16]     ED Arrival Information     Expected Arrival Acuity Means of Arrival Escorted By Service Admission Type    - 2/3/2020 15:29 Emergent Walk-In Self Hospitalist Emergency    Arrival Complaint    poss kidney stones        Chief Complaint   Patient presents with    Flank Pain     pt has kidney stone dx 3 weeks ago, "I am having kidney stone pain" reports passing on stone but "theres more in there " reports R sided flank pain     Assessment/Plan: 44 yo female to ED from home w/ R flank pain and kidney stone   Patient was diagnosed with a right sided kidney stone about 3 weeks ago and was treated with stent placement by Urology  However, for the past 5 days she has been experiencing intractable pain not controlled by oxycodone  It is sharp, severe, radiating to her groin, and more frequent than before  She reports associated nausea but no vomiting  CT scan did reveal double-J stent placement and no hydronephrosis  treated w/ IV abx and pain control in ED   Admitted OBS status plan to consult urology and strain ua   PE : RLQ tenderness, R CVA tenderness  2/4 Urology consult   Right Ureteral Calculus--status post recent cystoscopy, retrograde pyelography and ureteral stent placement  Initiate IVF , flomax, analgesics,antiemetics, abx while awaiting cx, strain ua and NPO for OR if no spontaneous expulsion achieved    Will plan for lithotripsy today  ED Triage Vitals   Temperature Pulse Respirations Blood Pressure SpO2   02/03/20 1628 02/03/20 1628 02/03/20 1628 02/03/20 1628 02/03/20 1628   99 3 °F (37 4 °C) (!) 115 18 (!) 186/99 93 %      Temp Source Heart Rate Source Patient Position - Orthostatic VS BP Location FiO2 (%)   02/03/20 1628 02/03/20 1628 02/03/20 1628 02/03/20 1628 --   Oral Monitor Sitting Left arm       Pain Score       02/03/20 2000       2        Wt Readings from Last 1 Encounters:   02/03/20 50 7 kg (111 lb 12 4 oz)     Additional Vital Signs:   02/04/20 0750    89  19  149/91  92 %  None (Room air)  Sitting   02/04/20 0451    86  15  146/90  91 %  None (Room air)  Lying   02/03/20 2000    91  16  148/85  93 %       02/03/20 1909    88  18  150/87  94 %  None (Room air)  Lying       Pertinent Labs/Diagnostic Test Results:   2/3 CT abd - 1  No hydronephrosis  2   Double-J stent right renal collecting system redemonstrated  There is mild ureteral wall thickening and periureteral haziness possibly related to postoperative changes  Superimposed urinary tract infection/ureteritis not excluded  Further clinical   assessment recommended  3   Bilateral intrarenal calculi, nonobstructing, increased in size on the right  4   Constipation    Results from last 7 days   Lab Units 02/04/20  0450 02/03/20  1830   WBC Thousand/uL 6 72 9 52   HEMOGLOBIN g/dL 13 0 13 1   HEMATOCRIT % 39 7 39 1   PLATELETS Thousands/uL 174 181   NEUTROS ABS Thousands/µL  --  7 62     Results from last 7 days   Lab Units 02/04/20  0450 02/03/20  1830   SODIUM mmol/L 139 137   POTASSIUM mmol/L 4 0 4 1   CHLORIDE mmol/L 104 103   CO2 mmol/L 25 20*   ANION GAP mmol/L 10 14*   BUN mg/dL 26* 26*   CREATININE mg/dL 0 97 1 10   EGFR ml/min/1 73sq m 72 62   CALCIUM mg/dL 8 9 9 4     Results from last 7 days   Lab Units 02/03/20  1830   AST U/L 15   ALT U/L 33   ALK PHOS U/L 90   TOTAL PROTEIN g/dL 8 2   ALBUMIN g/dL 3 9   TOTAL BILIRUBIN mg/dL 0 30     Results from last 7 days   Lab Units 02/04/20  0450 02/03/20  1830   GLUCOSE RANDOM mg/dL 123 158*     Results from last 7 days   Lab Units 02/03/20  1732   CLARITY UA  Clear   COLOR UA  Yellow   SPEC GRAV UA  1 020   PH UA 6 0   GLUCOSE UA mg/dl 250 (1/4%)*   KETONES UA mg/dl Negative   BLOOD UA  Large*   PROTEIN UA mg/dl 100 (2+)*   NITRITE UA  Negative   BILIRUBIN UA  Negative   UROBILINOGEN UA E U /dl 0 2   LEUKOCYTES UA  Small*   WBC UA /hpf 4-10*   RBC UA /hpf 10-20*   BACTERIA UA /hpf Occasional   EPITHELIAL CELLS WET PREP /hpf Occasional     ED Treatment:   Medication Administration from 02/03/2020 1528 to 02/04/2020 0801       Date/Time Order Dose Route Action     02/03/2020 1835 ondansetron (ZOFRAN) injection 4 mg 4 mg Intravenous Given     02/03/2020 1907 HYDROmorphone (DILAUDID) injection 0 5 mg 0 5 mg Intravenous Given     02/03/2020 1835 ketorolac (TORADOL) injection 15 mg 15 mg Intravenous Given     02/03/2020 2119 ceftriaxone (ROCEPHIN) 1 g/50 mL in dextrose IVPB 1,000 mg Intravenous New Bag     02/03/2020 2119 HYDROmorphone (DILAUDID) injection 0 5 mg 0 5 mg Intravenous Given     02/03/2020 2339 tacrolimus (PROGRAF) capsule 2 mg 2 mg Oral Given     02/03/2020 2339 magnesium oxide (MAG-OX) tablet 400 mg 400 mg Oral Given     02/04/2020 0052 lactated ringers infusion 100 mL/hr Intravenous New Bag     02/04/2020 0744 ondansetron (ZOFRAN) injection 4 mg 4 mg Intravenous Given     02/04/2020 0051 heparin (porcine) subcutaneous injection 5,000 Units 5,000 Units Subcutaneous Given     02/04/2020 0455 oxyCODONE (ROXICODONE) IR tablet 5 mg 5 mg Oral Given     02/04/2020 0750 HYDROmorphone (DILAUDID) injection 0 5 mg 0 5 mg Intravenous Given     02/04/2020 0046 HYDROmorphone (DILAUDID) injection 0 5 mg 0 5 mg Intravenous Given        Past Medical History:   Diagnosis Date    Asthma     Chronic pain     [facility]yst[facility] ([facility] [address] )     Transplant recipient      Present on Admission:   Ureterolithiasis   [facility]   Hyperglycemia   Pancreatic insufficiency      Admitting Diagnosis: Flank pain [R10 9]  Age/Sex: 45 y o  female  Admission Orders:  Scheduled Medications:    Medications:  atovaquone 750 mg Oral Daily dronabinol 5 mg Oral TID AC   ferrous sulfate 325 mg Oral BID With Meals   heparin (porcine) 5,000 Units Subcutaneous Q8H [address]   insulin lispro 1-5 Units Subcutaneous Q6H [address]   magnesium oxide 400 mg Oral BID   multivitamin-minerals 1 tablet Oral Daily   pancrelipase (Lip-Prot-Amyl) 48,000 Units Oral TID With Meals   pantoprazole 40 mg Oral Daily   predniSONE 10 mg Oral Daily   tacrolimus 2 mg Oral Q12H [address]   valGANciclovir 900 mg Oral Daily With Breakfast     Continuous IV Infusions:    lactated ringers 100 mL/hr Intravenous Continuous     PRN Meds:    acetaminophen 650 mg Oral Q6H PRN   albuterol 1 25 mg Nebulization Q4H PRN   ALPRAZolam 1 mg Oral BID PRN   gabapentin 100 mg Oral BID PRN   HYDROmorphone 0 5 mg Intravenous Q1H PRN x2   ondansetron 4 mg Intravenous Q6H PRN x1   oxyCODONE 10 mg Oral Q4H PRN   oxyCODONE 5 mg Oral Q4H PRN     NPO   Fingerstick q6hr   Strain all urine   Access port a cath  IP CONSULT TO UROLOGY    Network Utilization Review Department  [email or web address]  ATTENTION: Please call with any questions or concerns to [phone] and carefully listen to the prompts so that you are directed to the right person  All voicemails are confidential   Dasha Osborn all requests for admission clinical reviews, approved or denied determinations and any other requests to dedicated fax number below belonging to the campus where the patient is receiving treatment   List of dedicated fax numbers for the Facilities:  FACILITY NAME UR FAX NUMBER   ADMISSION DENIALS (Administrative/Medical Necessity) [phone]   [address]  (Maternity/NICU/Pediatrics) [phone]   Davey Valdez [phone]   [place] [place] [phone]   Mirza Frederick [phone]   [place] [id number] [facility] [address] [facility] [phone]   [address] [place] [address] [phone]

## 2020-02-04 NOTE — ASSESSMENT & PLAN NOTE
· Urinalysis positive for bacteria and cystine crystals   Was given antibiotics in the ED  · will hold off on additional antibiotics for now, but can restart if there is concern for infection  · Strain urine  · Consult Urology, appreciate recommendations

## 2020-02-04 NOTE — TELEPHONE ENCOUNTER
Stent on string removal in 3-5 days    Follow-up x-ray and ultrasound in 3 months with advanced practitioner

## 2020-02-04 NOTE — ASSESSMENT & PLAN NOTE
Patient has elevated A1cs in the past, however, she is also chronically on prednisone  · Obtain A1c  · Chronic steroid use

## 2020-02-04 NOTE — PLAN OF CARE
Problem: PAIN - ADULT  Goal: Verbalizes/displays adequate comfort level or baseline comfort level  Description  Interventions:  - Encourage patient to monitor pain and request assistance  - Assess pain using appropriate pain scale  - Administer analgesics based on type and severity of pain and evaluate response  - Implement non-pharmacological measures as appropriate and evaluate response  - Consider cultural and social influences on pain and pain management  - Notify physician/advanced practitioner if interventions unsuccessful or patient reports new pain  Outcome: Progressing     Problem: INFECTION - ADULT  Goal: Absence or prevention of progression during hospitalization  Description  INTERVENTIONS:  - Assess and monitor for signs and symptoms of infection  - Monitor lab/diagnostic results  - Monitor all insertion sites, i e  indwelling lines, tubes, and drains  - Monitor endotracheal if appropriate and nasal secretions for changes in amount and color  - [place] appropriate cooling/warming therapies per order  - Administer medications as ordered  - Instruct and encourage patient and family to use good hand hygiene technique  - Identify and instruct in appropriate isolation precautions for identified infection/condition  Outcome: Progressing  Goal: Absence of fever/infection during neutropenic period  Description  INTERVENTIONS:  - Monitor WBC    Outcome: Progressing     Problem: SAFETY ADULT  Goal: Patient will remain free of falls  Description  INTERVENTIONS:  - Assess patient frequently for physical needs  -  Identify cognitive and physical deficits and behaviors that affect risk of falls    -  [place] fall precautions as indicated by assessment   - Educate patient/family on patient safety including physical limitations  - Instruct patient to call for assistance with activity based on assessment  - Modify environment to reduce risk of injury  - Consider OT/PT consult to assist with strengthening/mobility  Outcome: Progressing  Goal: Maintain or return to baseline ADL function  Description  INTERVENTIONS:  -  Assess patient's ability to carry out ADLs; assess patient's baseline for ADL function and identify physical deficits which impact ability to perform ADLs (bathing, care of mouth/teeth, toileting, grooming, dressing, etc )  - Assess/evaluate cause of self-care deficits   - Assess range of motion  - Assess patient's mobility; develop plan if impaired  - Assess patient's need for assistive devices and provide as appropriate  - Encourage maximum independence but intervene and supervise when necessary  - Involve family in performance of ADLs  - Assess for home care needs following discharge   - Consider OT consult to assist with ADL evaluation and planning for discharge  - Provide patient education as appropriate  Outcome: Progressing  Goal: Maintain or return mobility status to optimal level  Description  INTERVENTIONS:  - Assess patient's baseline mobility status (ambulation, transfers, stairs, etc )    - Identify cognitive and physical deficits and behaviors that affect mobility  - Identify mobility aids required to assist with transfers and/or ambulation (gait belt, sit-to-stand, lift, walker, cane, etc )  - [place] fall precautions as indicated by assessment  - Record patient progress and toleration of activity level on Mobility SBAR; progress patient to next Phase/Stage  - Instruct patient to call for assistance with activity based on assessment  - Consider rehabilitation consult to assist with strengthening/weightbearing, etc   Outcome: Progressing     Problem: DISCHARGE PLANNING  Goal: Discharge to home or other facility with appropriate resources  Description  INTERVENTIONS:  - Identify barriers to discharge w/patient and caregiver  - Arrange for needed discharge resources and transportation as appropriate  - Identify discharge learning needs (meds, wound care, etc )  - Arrange for interpretive services to assist at discharge as needed  - Refer to Case Management Department for coordinating discharge planning if the patient needs post-hospital services based on physician/advanced practitioner order or complex needs related to functional status, cognitive ability, or social support system  Outcome: Progressing     Problem: Knowledge Deficit  Goal: Patient/family/caregiver demonstrates understanding of disease process, treatment plan, medications, and discharge instructions  Description  Complete learning assessment and assess knowledge base    Interventions:  - Provide teaching at level of understanding  - Provide teaching via preferred learning methods  Outcome: Progressing     Problem: RESPIRATORY - ADULT  Goal: Achieves optimal ventilation and oxygenation  Description  INTERVENTIONS:  - Assess for changes in respiratory status  - Assess for changes in mentation and behavior  - Position to facilitate oxygenation and minimize respiratory effort  - Oxygen administered by appropriate delivery if ordered  - Initiate smoking cessation education as indicated  - Encourage broncho-pulmonary hygiene including cough, deep breathe, Incentive Spirometry  - Assess the need for suctioning and aspirate as needed  - Assess and instruct to report SOB or any respiratory difficulty  - Respiratory Therapy support as indicated  Outcome: Progressing     Problem: METABOLIC, FLUID AND ELECTROLYTES - ADULT  Goal: Electrolytes maintained within normal limits  Description  INTERVENTIONS:  - Monitor labs and assess patient for signs and symptoms of electrolyte imbalances  - Administer electrolyte replacement as ordered  - Monitor response to electrolyte replacements, including repeat lab results as appropriate  - Instruct patient on fluid and nutrition as appropriate  Outcome: Progressing  Goal: Fluid balance maintained  Description  INTERVENTIONS:  - Monitor labs   - Monitor I/O and WT  - Instruct patient on fluid and nutrition as appropriate  - Assess for signs & symptoms of volume excess or deficit  Outcome: Progressing  Goal: Glucose maintained within target range  Description  INTERVENTIONS:  - Monitor Blood Glucose as ordered  - Assess for signs and symptoms of hyperglycemia and hypoglycemia  - Administer ordered medications to maintain glucose within target range  - Assess nutritional intake and initiate nutrition service referral as needed  Outcome: Progressing

## 2020-02-04 NOTE — ASSESSMENT & PLAN NOTE
Patient has elevated A1cs in the past, however, she is also chronically on prednisone  · Correctional insulin every 6 hours while NPO  · Obtain A1c

## 2020-02-04 NOTE — DISCHARGE INSTRUCTIONS
You have stent on a string, which will be removed in 3-5 days  Please take care not to remove with dressing or undressing  Our office staff will contact you to arrange an appointment for removal     Ureteral Stent Placement   WHAT YOU NEED TO KNOW:   Ureteral stent placement is a procedure to open a blocked or narrow ureter  The ureter is the tube that carries urine from your kidney into your bladder  A stent is a thin hollow plastic tube used to hold your ureter open and allow urine to flow  The stent may stay in for several weeks  DISCHARGE INSTRUCTIONS:   Medicines:   · Pain medicine  may be given to take away or decrease pain  Do not wait until the pain is severe before you take your medicine  · Antibiotics  help prevent infections  Your healthcare provider may prescribe these for you while your stent remains in  · Take your medicine as directed  Contact your healthcare provider if you think your medicine is not helping or if you have side effects  Tell him or her if you are allergic to any medicine  Keep a list of the medicines, vitamins, and herbs you take  Include the amounts, and when and why you take them  Bring the list or the pill bottles to follow-up visits  Carry your medicine list with you in case of an emergency  Follow up with your urologist as directed: You will need regular follow-up visits with your urologist as long as the stent remains in  He will check to make sure the stent is working properly  He may do urine cultures to check for infection  Write down your questions so you remember to ask them during your visits  Self-care:   · Drink liquids  as directed  Ask your healthcare provider how much liquid to drink each day and which liquids are best for you  Fluids such as cranberry or apple juice may be especially helpful to prevent urinary infections  · Return to normal activities  the day after your stent placement or as directed by your healthcare provider       · You may take a shower  the day after your stent placement if your healthcare provider says it is okay  Contact your healthcare provider or urologist if:   · You have a fever or chills  · You feel like you need to urinate often  · You have pain when you urinate or pain around your bladder or kidney  · You see blood in your urine or it looks cloudy  · You have questions or concerns about your condition or care  Seek care immediately or call 911 if:   · You urinate little or not at all  · You have severe pain in your abdomen  © 2017 [facility] Information is for End User's use only and may not be sold, redistributed or otherwise used for commercial purposes  All illustrations and images included in CareNotes® are the copyrighted property of A D A M , Inc  or Jorden Atwood  The above information is an  only  It is not intended as medical advice for individual conditions or treatments  Talk to your doctor, nurse or pharmacist before following any medical regimen to see if it is safe and effective for you

## 2020-02-04 NOTE — PROGRESS NOTES
[address] Internal Medicine   Progress Note Nicola Goodman 1975, 45 y o  female MRN: [id number]    Unit/Bed#: OR [place] Encounter: [id number]    Primary Care Provider: Isadora Apley, MD   Date and time admitted to hospital: 2/3/2020  5:20 PM        * Ureterolithiasis  Assessment & Plan  · Urinalysis positive for bacteria and cystine crystals  Was given antibiotics in the ED  · Urology following patient  Cystoscopy with lithotripsy and stent placement today  · Stent to be removed in 3 to 5 days   · Stable for dc tomorrow if no lung issues    Nausea and vomiting  Assessment & Plan  · Nausea and vomiting today unrelieved by Zofran  Phenergan IM given x 1     History of lung transplant ([facility] [address] )  Assessment & Plan  · Continue Prograf, prednisone  · Patient can be discharged tomorrow as long as no respiratory issues over night     Cystic fibrosis (HCC)  Assessment & Plan  · Albuterol p r n  · Continue regimen for history of lung transplant  · Outpatient follow-up    Hyperglycemia  Assessment & Plan  Patient has elevated A1cs in the past, however, she is also chronically on prednisone  · Obtain A1c  · Chronic steroid use    Pancreatic insufficiency  Assessment & Plan  · Continue pancreatic enzymes with meals    VTE Pharmacologic Prophylaxis:   Pharmacologic: Pharmacologic VTE Prophylaxis contraindicated due to patient is ambulatory   Mechanical VTE Prophylaxis in Place: No    Patient Centered Rounds: I have performed bedside rounds with nursing staff today  Discussions with Specialists or Other Care Team Provider: urology    Education and Discussions with Family / Patient: patient    Time Spent for Care: 20 minutes  More than 50% of total time spent on counseling and coordination of care as described above      Current Length of Stay: 0 day(s)    Current Patient Status: Observation   Certification Statement: The patient will continue to require additional inpatient hospital stay due to monitoring of respiratory status sp procedure and pain management    Discharge Plan: pending improvement tomorrow     Code Status: Level 1 - Full Code      Subjective:   Patient is lying in bed on side trying not to move due to nausea and vomiting  Having significant right sided flank pain that she states has been progressively getting worse over the last five days  Objective:     Vitals:   Temp (24hrs), Av 8 °F (37 1 °C), Min:97 7 °F (36 5 °C), Max:99 3 °F (37 4 °C)    Temp:  [97 7 °F (36 5 °C)-99 3 °F (37 4 °C)] 99 3 °F (37 4 °C)  HR:  [] 78  Resp:  [15-22] 16  BP: (120-186)/(72-99) 146/92  SpO2:  [91 %-99 %] 95 %  Body mass index is 20 44 kg/m²  Input and Output Summary (last 24 hours): Intake/Output Summary (Last 24 hours) at 2020 1522  Last data filed at 2020 1428  Gross per 24 hour   Intake 1423 33 ml   Output    Net 1423 33 ml       Physical Exam:     Physical Exam   Constitutional: She is oriented to person, place, and time  She appears well-developed and well-nourished  HENT:   Head: Normocephalic and atraumatic  Eyes: Pupils are equal, round, and reactive to light  Neck: Normal range of motion  Neck supple  Cardiovascular: Normal rate and regular rhythm  No murmur heard  Pulmonary/Chest: Effort normal    Abdominal: Soft  Bowel sounds are normal    Right-sided flank pain    Neurological: She is alert and oriented to person, place, and time  Skin: Skin is warm and dry  Psychiatric: She has a normal mood and affect   Her behavior is normal  Judgment and thought content normal          Additional Data:     Labs:    Results from last 7 days   Lab Units 20  0450 20  1830   WBC Thousand/uL 6 72 9 52   HEMOGLOBIN g/dL 13 0 13 1   HEMATOCRIT % 39 7 39 1   PLATELETS Thousands/uL 174 181   NEUTROS PCT %  --  80*   LYMPHS PCT %  --  15   MONOS PCT %  --  3*   EOS PCT %  --  1     Results from last 7 days   Lab Units 20  0450 20  1830   SODIUM mmol/L 139 137 POTASSIUM mmol/L 4 0 4 1   CHLORIDE mmol/L 104 103   CO2 mmol/L 25 20*   BUN mg/dL 26* 26*   CREATININE mg/dL 0 97 1 10   ANION GAP mmol/L 10 14*   CALCIUM mg/dL 8 9 9 4   ALBUMIN g/dL  --  3 9   TOTAL BILIRUBIN mg/dL  --  0 30   ALK PHOS U/L  --  90   ALT U/L  --  33   AST U/L  --  15   GLUCOSE RANDOM mg/dL 123 158*         Results from last 7 days   Lab Units 02/04/20  1105   POC GLUCOSE mg/dl 112     Results from last 7 days   Lab Units 02/04/20  0450   HEMOGLOBIN A1C % 7 5*               * I Have Reviewed All Lab Data Listed Above  * Additional Pertinent Lab Tests Reviewed:  [address] Admission Reviewed    Imaging:    Imaging Reports Reviewed Today Include: ct scan   Imaging Personally Reviewed by Myself Includes:  none    Recent Cultures (last 7 days):           Last 24 Hours Medication List:     Current Facility-Administered Medications:  acetaminophen 650 mg Oral Q6H PRN Del Blank, DO   albuterol 1 25 mg Nebulization Q4H PRN Del Blank, DO   albuterol 2 5 mg Nebulization Once [employer], CRNA   ALPRAZolam 1 mg Oral BID PRN Del Blank, DO   atovaquone 750 mg Oral Daily Griselda Walton, DO   dronabinol 5 mg Oral TID ALFREDO Walton, DO   fentaNYL 50 mcg Intravenous Q5 Min PRN [employer], CRNA   ferrous sulfate 325 mg Oral BID With Meals Del Blank, DO   gabapentin 100 mg Oral BID PRN Del Blank, DO   [facility] Hold] heparin (porcine) 5,000 Units Subcutaneous [facility] WAYNE Scherer   [facility] Hold] HYDROmorphone 0 5 mg Intravenous Q1H PRN Del Blank, DO   insulin lispro 1-5 Units Subcutaneous Q6H [address] Griselda Walton, DO   magnesium oxide 400 mg Oral BID Del Blank, DO   multivitamin-minerals 1 tablet Oral Daily Griselda Walton DO   [[facility] Hold] ondansetron 4 mg Intravenous Q6H PRN Griselda Walton DO   [[facility] Hold] oxyCODONE 10 mg Oral Q4H PRN Griselda Walton DO   [MAR Hold] oxyCODONE 5 mg Oral Q4H PRN Grieslda Ramos DO Anton   pancrelipase (Lip-Prot-Amyl) 48,000 Units Oral TID With Meals Bailey Walton DO   pantoprazole 40 mg Oral Daily Bailey Walton DO   phenazopyridine 100 mg Oral TID With Meals Dedra Samaniego MD   predniSONE 10 mg Oral Daily Bailey Walton DO   promethazine 12 5 mg Intravenous Once PRN Sapphire Christian CRNA   tacrolimus 2 mg Oral Q12H [facility] Zara Orozco DO   tamsulosin 0 4 mg Oral Daily With Krista Soliman MD   valGANciclovir 900 mg Oral Daily With Breakfast Zara Orozco DO        Today, Patient Was Seen By: WAYNE Williamson    ** Please Note: Dictation voice to text software may have been used in the creation of this document   **

## 2020-02-04 NOTE — ED NOTES
Patient transported to [address] [place] [address]  [facility]  02/03/20 1921

## 2020-02-04 NOTE — OP NOTE
OPERATIVE REPORT  PATIENT NAME: Wilfredo Fortune    :  1975  MRN: [id number]  Pt Location: [facility] OR ROOM 04    SURGERY DATE: 2020    Surgeon(s) and Role:     * Isela Mcallister MD - Primary    Preop Diagnosis:  Ureterolithiasis [N20 1]    Post-Op Diagnosis Codes:     * Ureterolithiasis [N20 1]    Procedure(s) (LRB):  CYSTOSCOPY URETEROSCOPY WITH LITHOTRIPSY HOLMIUM LASER, RETROGRADE PYELOGRAM AND INSERTION STENT URETERAL (Right)    Specimen(s):  ID Type Source Tests Collected by Time Destination   A : Right Renal Pelvis Urine Urine, Renal, Right URINE CULTURE Isela Mcallister MD 2020 1359    B : Right Ureteral Stone Calculus Ureter, Right STONE ANALYSIS Isela Mcallister MD 2020 1423        Estimated Blood Loss:   Minimal    Drains:  Ureteral Drain/Stent Right ureter 6 Fr  (Active)   Number of days: 0       Anesthesia Type:   General    Operative Indications:  Ureterolithiasis [N20 1]      Operative Findings:  1  Lower pole renal stone, fragmented and removed  2  6x26 RIGHT stent placed with string    Complications:   None    Procedure and Technique:  Wilfredo Hooker is a 45y o -year-old female with a 12mm RIGHT sided stone  Previously underwent stent placement  Return to the emergency room with pain  No signs of infection  They are proceeding to the operating room on 2020 to undergo RIGHT-sided ureteroscopy with holmium laser lithotripsy  Risk and benefits of the procedure were discussed and reviewed  Informed consent was obtained  After the smooth induction of general endotracheal anesthesia, the patient was placed in the dorsal lithotomy position  Her genitalia was prepped and draped in a sterile fashion  Intravenous antibiotics were administered in the form of Levaquin, patient also had received ceftriaxone within the last 24 hours    A timeout was performed with all members of the operative team confirming the patient's identity, procedure to be performed, and laterality of the case  A 22 [place] Islander rigid cystoscope with 30° lens was inserted  The bladder was thoroughly inspected  Attention was focused on the right ureteral orifice  There was a previously placed stent present  Because there was some difficulty passing the wire through the stent, cystoscope was performed and the wire was passed alongside the stent up to the level of the renal pelvis  Stent was removed intact  Over the wire, dual-lumen catheter was placed  A retrograde pyelogram was performed demonstrating mild hydronephrosis  At this point, a second wire was passed  One wire was secured as a "safety" wire while the other was used as a "working" wire  The dual lumen was removed leaving the wires in place  A 12/14 35cm ureteral access sheath was then inserted over the working wire  An Olympus flexible ureteroscope was then passed  The kidney was thoroughly inspected  A stone was identified in the lower pole  The 272 µ holmium laser fiber was utilized to [employer] the stone into small fragments  A [employer] Sky[employer]ite basket was then used to remove all visualized fragments larger than 2mm     Contrast was injected  The kidney was thoroughly reinspected in the upper, inerpolar and lower pole regions  There were no residual stones identified  The flexible scope was slowly withdrawn through the ureter during a pull-out ureteroscopy  There were no residual stones within the ureter  The wire was backloaded through the cystoscope and a 6 [place] Islander 26 cm double-J ureteral stent was then placed in the standard fashion  The proximal coil was appreciated in the right renal pelvis and the distal coil was visualized within the bladder  A string was left in place  The bladder was emptied and the cystoscope was removed  The string was secured to the right thigh with Tegaderm  2% viscous lidocaine was placed per urethra      Overall the patient tolerated the procedure well and there were no complications  The patient was taken out of dorsal lithotomy, extubated in the operating room and transferred to the PACU in stable condition at the conclusion of the case      Plan-patient will have her stent removed in 3-5 days     I was present for the entire procedure    Patient Disposition:  PACU     SIGNATURE: Christina Garcia MD  DATE: February 4, 2020  TIME: 2:33 PM

## 2020-02-04 NOTE — ANESTHESIA PREPROCEDURE EVALUATION
Review of Systems/Medical History  Patient summary reviewed  Chart reviewed  No history of anesthetic complications     Cardiovascular  EKG reviewed, Exercise tolerance (METS): >4,  No past MI , No CAD , No angina ,    Pulmonary  Asthma , PRN med  controlled Last rescue: < 6 months ago , No shortness of breath, No recent URI , Not oxygen dependent ,   Comment: H/o lung transplantation     GI/Hepatic      Comment: NPO > 8hrs    Active nausea/vomiting     Kidney stones, No kidney disease ,        Endo/Other    Comment: Cystic fibrosis s/p SUKI x2 (2013, 2017) now with severe ODALYS (left lung), prior MDR infections, currently on tacrolimus and prednisone daily    Follows at [facility] Transplant:  Dr Verna Nix       GYN  Negative gynecology ROS Not currently pregnant ,     Comment: lmp currently     Hematology    Immunocompromised state ,   Comment: Immunosuppressants for transplant, tacro and prednisone today at 1130 am Musculoskeletal  Negative musculoskeletal ROS        Neurology  Negative neurology ROS      Psychology   Anxiety,              Physical Exam    Airway    Mallampati score: I  TM Distance: >3 FB  Neck ROM: full     Dental   No notable dental hx     Cardiovascular  Rhythm: regular, Rate: normal,     Pulmonary      Other Findings  Inspiratory/expiratory rhonchi but good aertion in all lung fields      Anesthesia Plan  ASA Score- 3 Emergent    Anesthesia Type- general with ASA Monitors  Additional Monitors:   Airway Plan: ETT      Comment: Recent labs personally reviewed:  Lab Results       Component                Value               Date                       WBC                      6 72                02/04/2020                 HGB                      13 0                02/04/2020                 PLT                      174                 02/04/2020            Lab Results       Component                Value               Date                       K                        4 0 02/04/2020                 BUN                      26 (H)              02/04/2020                 CREATININE               0 97                02/04/2020            No results found for: PTT   No results found for: INR    Blood type     Patient tolerated last anesthetic without complications  Will proceed with similar dosing of medications  GETA, RSI for aspiration risk, plan for extubation but did discuss post op intubation or ICU stay if any respiratory issues intraop  Plan reviewed with CRNP at lung transplant program at [facility], phone number listed above  Kevin Hall MD, have personally seen and evaluated the patient prior to anesthetic care  I have reviewed the pre-anesthetic record, medical history, allergies, medications and any other medical records if appropriate to the anesthetic care  If a CRNA is involved in the case, I have reviewed the CRNA assessment, if present, and agree  I discussed the anesthetic plan and risks/benefits/alternatives with the patient including possible PONV, sore throat, and possibility of rare anesthetic and surgical emergencies        Plan Factors-    Induction- intravenous and rapid sequence induction  Postoperative Plan- Plan for postoperative opioid use  Informed Consent- Anesthetic plan and risks discussed with patient  I personally reviewed this patient with the CRNA  Discussed and agreed on the Anesthesia Plan with the DEANDRE Washburn

## 2020-02-04 NOTE — CONSULTS
CONSULT    Patient Name: Davi Linn  Patient MRN: [id number]  Date of Service: 2/4/2020   Date / Time Note Created: 2/4/2020 11:56 AM   Referring Provider: Marquez Campos MD    Provider Creating Note: WAYNE Marcus    PCP: Gladys Jones  Attending Provider:  Marquez Campos MD    Reason for Consult: Flank Pain    HPI:  Davi Linn is a 43-year-old female with history of cystic fibrosis prior lung transplantation x2, on chronic immunosuppression; admitted for right flank pain and seen in consultation 01/09/2020 for ureteral stenting secondary to right UPJ 8 mm calculus  She was scheduled for definitive stone treatment 02/17/2020 but presented to [place] [address] Emergency Room with right flank, significant nausea/vomiting; not accompanied by fever, chills or hemodynamically instability  CT of the abdomen and pelvis demonstrated right renal collecting system double J-stent, several intrarenal calculi bilaterally  Creatinine and white count are within normal limits  Urinalysis is not very suggestive of infection  She is admitted to the Internal Medicine service and urologic consultation is requested for further management recommendations  Active Problems:    Patient Active Problem List   Diagnosis    Ureterolithiasis    [facility] [address] [facility]    Cystic fibrosis ([facility] [address] [facility]    Hyperglycemia    Obstruction of right ureteropelvic junction (UPJ) due to stone    Abnormal CT scan, colitis    Right ureteral stone    Pancreatic insufficiency            Impressions  Right Ureteral Calculus--status post recent cystoscopy, retrograde pyelography and ureteral stent placement    Renal Colic    Recommendations  1  Initiate aggressive IVFs   2  Flomax  3  Analgesia/Narcotics   4  Anti-emetics   5  ATBs empirically while awaiting culture   6  Strain urine   7  NPO  for OR if no spontaneous expulsion achieved    Will plan for lithotripsy today    Explained risk, benefits and potential complications of ureteroscopic stone extraction  Patient has verbalized understanding of possible need for ureteral stent only for any signs of infection and/or technical difficult prohibiting stone retrieval etc ; requiring staged ureteroscopy electively once recovered and infection free as OP  Formal consent by surgeon          Past Medical History:   Diagnosis Date    Asthma     Chronic pain     Cystic fibrosis ([address] )     Transplant recipient        Past Surgical History:   Procedure Laterality Date    FL RETROGRADE PYELOGRAM  1/9/2020    LUNG SURGERY      [place] CYSTOURETHROSCOPY,URETER CATHETER Right 1/9/2020    Procedure: CYSTOSCOPY RETROGRADE PYELOGRAM WITH INSERTION STENT URETERAL;  Surgeon: Gumaro Moya MD;  Location: [facility] OR;  Service: Urology       Family History   Family history unknown: Yes       Social History     Socioeconomic History    Marital status: /Civil Union     Spouse name: Not on file    Number of children: Not on file    Years of education: Not on file    Highest education level: Not on file   Occupational History    Not on file   Social Needs    Financial resource strain: Not on file    Food insecurity:     Worry: Not on file     Inability: Not on file    Transportation needs:     Medical: Not on file     Non-medical: Not on file   Tobacco Use    Smoking status: Never Smoker    Smokeless tobacco: Never Used   Substance and Sexual Activity    Alcohol use: Yes     Frequency: 2-4 times a month     Comment: social    Drug use: Yes     Frequency: 2 0 times per week     Types: Marijuana     Comment: medical edible marijuana prescribed    Sexual activity: Not on file   Lifestyle    Physical activity:     Days per week: Not on file     Minutes per session: Not on file    Stress: Not on file   Relationships    Social connections:     Talks on phone: Not on file     Gets together: Not on file     Attends [employer] service: Not on file     Active member of club or organization: Not on file     Attends meetings of clubs or organizations: Not on file     Relationship status: Not on file    Intimate partner violence:     Fear of current or ex partner: Not on file     Emotionally abused: Not on file     Physically abused: Not on file     Forced sexual activity: Not on file   Other Topics Concern    Not on file   Social History Narrative    Not on file       Allergies   Allergen Reactions    Vancomycin Angioedema and Hives    Ceftazidime Headache     Severe headaches after desensitization       Review of Systems  10 point review of systems negative except as noted in HPI  Constitutional:   negative for - chills or fever  Cardiovascular:   no chest pain or dyspnea on exertion  Gastrointestinal:   positive for - abdominal pain and nausea/vomiting  Genito-Urinary:   no dysuria, trouble voiding, or hematuria  Neurological:   no TIA or stroke symptoms     Chart Review   Allergies, current medications, history, problem list    Vital Signs  /85 (BP Location: Right arm)   Pulse 82   Temp 99 3 °F (37 4 °C) (Oral)   Resp 19   Wt 50 7 kg (111 lb 12 4 oz)   SpO2 98%   BMI 20 44 kg/m²     Physical Exam  General appearance: alert and oriented, in no acute distress, appears stated age, cooperative and no distress  Head: Normocephalic, without obvious abnormality, atraumatic  Neck: no adenopathy, no carotid bruit, no JVD, supple, symmetrical, trachea midline and thyroid not enlarged, symmetric, no tenderness/mass/nodules  Lungs: diminished breath sounds  Abdomen: soft, non-tender; bowel sounds normal; no masses,  no organomegaly  Pelvic: deferred  Extremities: extremities normal, warm and well-perfused; no cyanosis, clubbing, or edema  Pulses: 2+ and symmetric  Neurologic: Grossly normal  No external urinary drains     Laboratory Studies  Lab Results   Component Value Date    HGBA1C 7 5 (H) 02/04/2020    K 4 0 02/04/2020     02/04/2020    CO2 25 02/04/2020    CREATININE 0 97 02/04/2020    BUN 26 (H) 02/04/2020    MG 1 7 01/09/2020    PHOS 2 6 (L) 11/12/2019     Lab Results   Component Value Date    WBC 6 72 02/04/2020    RBC 4 13 02/04/2020    HGB 13 0 02/04/2020    HCT 39 7 02/04/2020    MCV 96 02/04/2020    MCH 31 5 02/04/2020    RDW 13 7 02/04/2020     02/04/2020         Imaging and Other Studies  )  Ct Abdomen Pelvis Wo Contrast    Result Date: 1/10/2020  Narrative: CT ABDOMEN AND PELVIS WITHOUT IV CONTRAST INDICATION:   Nausea/vomiting Rt UPJ stone s/p stent placement today  COMPARISON:  1/2/2020  TECHNIQUE:  CT examination of the abdomen and pelvis was performed without intravenous contrast   Axial, sagittal, and coronal 2D reformatted images were created from the source data and submitted for interpretation  Radiation dose length product (DLP) for this visit:  445 mGy-cm   This examination, like all CT scans performed in the [facility], was performed utilizing techniques to minimize radiation dose exposure, including the use of iterative reconstruction and automated exposure control  Enteric contrast was administered  FINDINGS: ABDOMEN LOWER CHEST:  Stable bibasilar ill-defined opacities and tiny nodular densities with tree-in-bud appearance and bronchiectasis  Surgical clip in the medial left lung base  LIVER/BILIARY TREE:  Unremarkable  GALLBLADDER:  No calcified gallstones  No pericholecystic inflammatory change  SPLEEN:  Unremarkable  PANCREAS:  Unremarkable  ADRENAL GLANDS:  Unremarkable  KIDNEYS/URETERS:  Right ureteral double-J stent has been placed in the interval   Previously seen obstructing stone in the right UPJ no longer visualized  Previously seen right hydronephrosis has essentially resolved  And do the 2 mm nonobstructing stone is seen in the interpolar right kidney  12 mm nonobstructing stones in the left kidney  No hydronephrosis  STOMACH AND BOWEL:  Terminal ileal enteritis and proximal ascending colon and cecal colitis  Neo Dash APPENDIX:  There are expected postoperative changes of appendectomy  ABDOMINOPELVIC CAVITY:  No ascites or free intraperitoneal air  No lymphadenopathy  VESSELS:  Unremarkable for patient's age  PELVIS REPRODUCTIVE ORGANS:  Unremarkable for patient's age  URINARY BLADDER:  Bladder decompressed by Kim catheter with tip and balloon seen 2 mm nonobstructing stone seen in the right within the bladder    ABDOMINAL WALL/INGUINAL REGIONS: Bilateral breast augmentation  Otherwise Unremarkable  OSSEOUS STRUCTURES:  No acute fracture or destructive osseous lesion  Impression: Terminal ileal enteritis and proximal ascending colon and cecal colitis    Right ureteral double-J stent has been placed in the interval   Previously seen obstructing stone in the right UPJ no longer visualized  Previously seen right hydronephrosis has essentially resolved  2 mm nonobstructing stone is seen in the interpolar right kidney  12 mm nonobstructing stones in the left kidney  No hydronephrosis  Stable bibasilar ill-defined opacities and tiny nodular densities with tree-in-bud appearance and bronchiectasis  Surgical clip in the medial left lung base  Workstation performed: [id number]     Fl Retrograde Pyelogram    Result Date: 1/9/2020  Narrative: RIGHT RETROGRADE PYELOGRAM INDICATION:   Obstruction of right ureteropelvic junction (UPJ) due to stone  COMPARISON: CT abdomen pelvis 1/8/2020 IMAGES:  6 FLUOROSCOPY TIME:   14 seconds CONTRAST: 2 5 mL of iohexol (OMNIPAQUE) FINDINGS: Fluoroscopic guidance provided for retrograde pyelogram  Moderate hydronephrosis is present  Filling defect noted at the ureteropelvic junction, corresponding to calculus seen on CT  Nephroureteral stent was left in place  Osseous and soft tissue detail limited by technique          Impression: Fluoroscopic guidance provided for right retrograde pyelogram   Moderate hydronephrosis with filling defect at the ureteropelvic junction noted, corresponding to obstructing calculus on CT  Nephroureteral was left in place  Please see procedure report for further details  Workstation performed: [id number]     Ct Renal Stone Study Abdomen Pelvis Wo Contrast    Result Date: 2/3/2020  Narrative: CT ABDOMEN AND PELVIS WITHOUT IV CONTRAST - LOW DOSE RENAL STONE INDICATION:   Flank pain, kidney stone suspected  COMPARISON:  1/10/2020 TECHNIQUE:  Low dose thin section CT examination of the abdomen and pelvis was performed without intravenous or oral contrast according to a protocol specifically designed to evaluate for urinary tract calculus  Axial, sagittal, and coronal 2D reformatted images were created from the source data and submitted for interpretation  Evaluation for pathology in the abdomen and pelvis that is unrelated to urinary tract calculi is limited  Radiation dose length product (DLP) for this visit:  243 27 mGy-cm   This examination, like all CT scans performed in the [facility], was performed utilizing techniques to minimize radiation dose exposure, including the use of iterative  reconstruction and automated exposure control  FINDINGS: RIGHT KIDNEY AND URETER: Double-J stent in the right renal collecting system noted without hydronephrosis  A few intrarenal calculi noted, the larger one measuring 8 mm on image 48, series 2 is significantly increased in size from study performed a few weeks ago, previously measuring 2 mm  The smaller 3 mm calculus on image 53 is also new from the prior study  There is thickening of the proximal to mid ureter adjacent to the stent, nonspecific  Periureteral strandy densities noted  No definite ureteral calculus  LEFT KIDNEY AND URETER: Large nonobstructing left lower pole renal calculus image 53, series 2 measures 1 cm, stable from the prior study  No hydronephrosis  URINARY BLADDER: Distal end of the right-sided double-J stent is noted in the bladder  No bladder calculi detected   Bronchiectatic changes with fine reticular interstitial densities are stable  Mild chambered densities at the left lung base medially are diminished slightly from the prior study  No new parenchymal opacity  Pleural calcification versus 5 mm calcified  granuloma right lower lobe image 17, series 2 again noted Limited low radiation dose noncontrast CT evaluation demonstrates no clinically significant abnormality of liver, spleen, pancreas, or adrenal glands  Clip at the GE junction noted image 1, series 2  The gallbladder is surgically absent  No ascites or bulky lymphadenopathy on this limited noncontrast study  There is a large amount of retained colonic stool in the right hemicolon and transverse colon  Moderate stool in the left hemicolon and sigmoid colon  Rectum is relatively decompressed  Limited evaluation demonstrates no evidence to suggest acute appendicitis  No acute fracture or destructive osseous lesion is identified  Impression: 1  No hydronephrosis  2   Double-J stent right renal collecting system redemonstrated  There is mild ureteral wall thickening and periureteral haziness possibly related to postoperative changes  Superimposed urinary tract infection/ureteritis not excluded  Further clinical  assessment recommended  3   Bilateral intrarenal calculi, nonobstructing, increased in size on the right  4   Constipation  Workstation performed: [id number]     Ct Abdomen Pelvis With Contrast    Result Date: 1/8/2020  Narrative: CT ABDOMEN AND PELVIS WITH IV CONTRAST INDICATION:   Right flank and RLQ abd pain  COMPARISON:  CT chest on 11/12/2019  TECHNIQUE:  CT examination of the abdomen and pelvis was performed  Axial, sagittal, and coronal 2D reformatted images were created from the source data and submitted for interpretation  Radiation dose length product (DLP) for this visit:  473 63 mGy-cm     This examination, like all CT scans performed in the [place], was performed utilizing techniques to minimize radiation dose exposure, including the use of iterative  reconstruction and automated exposure control  IV Contrast:  100 mL of iohexol (OMNIPAQUE) Enteric Contrast:  Enteric contrast was not administered  FINDINGS: ABDOMEN LOWER CHEST:  Grossly stable bibasilar ill-defined opacities and tiny nodular densities with tree-in-bud appearance and bronchiectasis  Surgical clip in the medial left lung base  Partially imaged median sternotomy wires and bilateral breast implants  LIVER/BILIARY TREE:  Stable 1 2 cm low-density in the anterior aspect of the medial segment left lobe subjacent to the hepatic capsule  GALLBLADDER:  Gallbladder is surgically absent  SPLEEN:  Unremarkable  PANCREAS:  Complete fatty replacement of the pancreas  ADRENAL GLANDS:  Unremarkable  KIDNEYS/URETERS:  8 mm calculus at the right UPJ causing mild hydronephrosis  Nonobstructing 2 mm calculus in the right renal mid pole and nonobstructing 12 mm calculus in the left renal lower pole  STOMACH AND BOWEL:  Limited evaluation without enteric contrast   No bowel obstruction  Suggestion of mild wall thickening of the ascending colon, hepatic flexure and transverse colon  Surgical clips in the region of the base of the cecum  APPENDIX:  No findings to suggest appendicitis  ABDOMINOPELVIC CAVITY:  No ascites or free intraperitoneal air  No lymphadenopathy  VESSELS:  Venous collaterals are present in the left upper quadrant  PELVIS REPRODUCTIVE ORGANS:  Unremarkable for patient's age  URINARY BLADDER:  Unremarkable  ABDOMINAL WALL/INGUINAL REGIONS:  Unremarkable  OSSEOUS STRUCTURES:  No acute fracture or destructive osseous lesion  Impression: 1  Mild right hydronephrosis secondary to 8 mm calculus at the right UPJ  2   Nonobstructing bilateral renal calculi   3   Suggestion of mild wall thickening of the ascending colon, hepatic flexure and transverse colon probably related to underdistention, however correlate for colitis  4   Grossly stable bibasilar nodular densities with tree in bud appearance and bronchiectasis  The study was marked in [facility] for immediate notification   Workstation performed: [id number]       Medications     Current Facility-Administered Medications:  acetaminophen 650 mg Oral Q6H PRN Deidra Bar, DO   albuterol 1 25 mg Nebulization Q4H PRN Deidra Bar, DO   ALPRAZolam 1 mg Oral BID PRN Deidra Bar, DO   atovaquone 750 mg Oral Daily [facility] Leah Walton, DO   dronabinol 5 mg Oral TID AC Deidra Bar, DO   ferrous sulfate 325 mg Oral BID With Meals Deidra Bar, DO   gabapentin 100 mg Oral BID PRN Deidra Bar, DO   heparin (porcine) 5,000 Units Subcutaneous [facility] WAYNE Chang   HYDROmorphone 0 5 mg Intravenous Q1H PRN Deidra Bar, DO   insulin lispro 1-5 Units Subcutaneous Q6H [address] [facility]   magnesium oxide 400 mg Oral BID Deidra Bar, DO   multivitamin-minerals 1 tablet Oral Daily [facility] Leah Walton, DO   ondansetron 4 mg Intravenous Q6H PRN Deidra Bar, DO   oxyCODONE 10 mg Oral Q4H PRN Deidra Bar, DO   oxyCODONE 5 mg Oral Q4H PRN Deidra Bar, DO   pancrelipase (Lip-Prot-Amyl) 48,000 Units Oral TID With Meals Deidra Bar, DO   pantoprazole 40 mg Oral Daily [facility] Leah Walton, DO   predniSONE 10 mg Oral Daily [facility] Leah Walton, DO   tacrolimus 2 mg Oral Q12H [address] Deidra [facility], DO   valGANciclovir 900 mg Oral Daily With Breakfast Deidra Bar, DO                 WAYNE Gomez

## 2020-02-04 NOTE — H&P
H&P- David Ritter Bautistanisshan 1975, 45 y o  female MRN: [id number]    Unit/Bed#: ED 17 Encounter: [id number]    Primary Care Provider: Umang Hirsch MD   Date and time admitted to hospital: 2/3/2020  5:20 PM        * Ureterolithiasis  Assessment & Plan  · Urinalysis positive for bacteria and cystine crystals  Was given antibiotics in the ED  · will hold off on additional antibiotics for now, but can restart if there is concern for infection  · Strain urine  · Consult Urology, appreciate recommendations    Pancreatic insufficiency  Assessment & Plan  Continue pancreatic enzymes with meals    Hyperglycemia  Assessment & Plan  Patient has elevated A1cs in the past, however, she is also chronically on prednisone  · Correctional insulin every 6 hours while NPO  · Obtain A1c    Cystic fibrosis (HCC)  Assessment & Plan  · Albuterol p r n  · Continue regimen for history of lung transplant  · Outpatient follow-up    History of lung transplant ([facility] [address] )  Assessment & Plan  Continue Prograf, prednisone        VTE Prophylaxis: Heparin  / reason for no mechanical VTE prophylaxis Moderate risk DVT   Code Status:  Live 1-full code   POLST: POLST form is not discussed and not completed at this time  Anticipated Length of Stay:  Patient will be admitted on an Observation basis with an anticipated length of stay of < 2 midnights  Justification for Hospital Stay: Please see detailed plans noted above  Chief Complaint:     Right flank pain    History of Present Illness:  Bentley Canales is a 45 y o  female who presents with right flank pain and kidney stone  She has history significant for pancreatic insufficiency, cystic fibrosis, and bilateral lung transplant  Patient was diagnosed with a right sided kidney stone about 3 weeks ago and was treated with stent placement by Urology  However, for the past 5 days she has been experiencing intractable pain not controlled by oxycodone    It is sharp, severe, radiating to her groin, and more frequent than before  She reports associated nausea but no vomiting  CT scan did reveal double-J stent placement and no hydronephrosis, but did show ureteral thickening and bilateral intrarenal calculi  Was given antibiotics and IV pain medication in the ED  Case was discussed with Urology who recommended admission to Medicine with urology consult  Currently, patient is awake, alert, no respiratory distress  Appears comfortable on exam   Pain currently improved  Review of Systems   Constitutional: Negative for activity change, chills and fever  HENT: Negative  Negative for hearing loss, sore throat and trouble swallowing  Eyes: Negative for visual disturbance  Respiratory: Negative for cough, shortness of breath and wheezing  Cardiovascular: Negative for chest pain, palpitations and leg swelling  Gastrointestinal: Positive for nausea  Negative for abdominal distention, abdominal pain, blood in stool, constipation, diarrhea and vomiting  Endocrine: Negative  Genitourinary: Positive for flank pain  Negative for dysuria, frequency and hematuria  Musculoskeletal: Positive for back pain  Negative for arthralgias, joint swelling and myalgias  Skin: Negative  Allergic/Immunologic: Negative for immunocompromised state  Neurological: Negative for dizziness, facial asymmetry, speech difficulty, weakness, numbness and headaches  Hematological: Negative  Psychiatric/Behavioral: Negative for behavioral problems and confusion           Past Medical and Surgical History:   Past Medical History:   Diagnosis Date    Asthma     Chronic pain     Cystic fibrosis ([facility] [address] )     Transplant recipient      Past Surgical History:   Procedure Laterality Date    FL RETROGRADE PYELOGRAM  1/9/2020    LUNG SURGERY      NC CYSTOURETHROSCOPY,URETER CATHETER Right 1/9/2020    Procedure: CYSTOSCOPY RETROGRADE PYELOGRAM WITH INSERTION STENT URETERAL;  Surgeon: Vanesa Leigh MD; Location: [facility];  Service: Urology       Meds/Allergies:    (Not in a hospital admission)    Allergies: Allergies   Allergen Reactions    Vancomycin Angioedema and Hives    Ceftazidime Headache     Severe headaches after desensitization     History:  Marital Status: /Civil Union     Substance Use History:   Social History     Substance and Sexual Activity   Alcohol Use Yes    Frequency: 2-4 times a month    Comment: social     Social History     Tobacco Use   Smoking Status Never Smoker   Smokeless Tobacco Never Used     Social History     Substance and Sexual Activity   Drug Use Yes    Frequency: 2 0 times per week    Types: Marijuana    Comment: medical edible marijuana prescribed       Family History:  Family History   Family history unknown: Yes       Physical Exam:     Vitals:   Blood Pressure: 148/85 (02/03/20 2000)  Pulse: 91 (02/03/20 2000)  Temperature: 99 3 °F (37 4 °C) (02/03/20 1628)  Temp Source: Oral (02/03/20 1628)  Respirations: 16 (02/03/20 2000)  Weight - Scale: 50 7 kg (111 lb 12 4 oz) (02/03/20 1628)  SpO2: 93 % (02/03/20 2000)    Constitutional:  Well developed, well nourished, no acute distress, non-toxic appearance   Eyes:  PERRL, conjunctiva normal   HENT:  Atraumatic, external ears normal, nose normal, oropharynx moist, no pharyngeal exudates  Neck - normal range of motion, no tenderness, supple   Respiratory:  No respiratory distress  Normal breath sounds  No rales, no wheezing   Cardiovascular:  Normal rate, normal rhythm, no murmurs, no gallops, no rubs   GI:  Soft, nondistended, normal bowel sounds, right lower quadrant tenderness, no organomegaly, no mass, no rebound, no guarding   :  Right costovertebral angle tenderness  Musculoskeletal:  No edema, no tenderness, no deformities   Back - right CVA tenderness  Integument:  Well hydrated, no rash   Lymphatic:  No lymphadenopathy noted   Neurologic:  Alert & awake, communicative, CN 2-12 normal, normal motor function, normal sensory function, no focal deficits noted   Psychiatric:  Speech and behavior appropriate       Lab Results: I have personally reviewed pertinent reports  Results from last 7 days   Lab Units 02/03/20  1830   WBC Thousand/uL 9 52   HEMOGLOBIN g/dL 13 1   HEMATOCRIT % 39 1   PLATELETS Thousands/uL 181   NEUTROS PCT % 80*   LYMPHS PCT % 15   MONOS PCT % 3*   EOS PCT % 1     Results from last 7 days   Lab Units 02/03/20  1830   POTASSIUM mmol/L 4 1   CHLORIDE mmol/L 103   CO2 mmol/L 20*   BUN mg/dL 26*   CREATININE mg/dL 1 10   CALCIUM mg/dL 9 4   ALK PHOS U/L 90   ALT U/L 33   AST U/L 15           EKG:  No acute ischemia    Imaging: I have personally reviewed pertinent reports  Ct Abdomen Pelvis Wo Contrast    Result Date: 1/10/2020  Narrative: CT ABDOMEN AND PELVIS WITHOUT IV CONTRAST INDICATION:   Nausea/vomiting Rt UPJ stone s/p stent placement today  COMPARISON:  1/2/2020  TECHNIQUE:  CT examination of the abdomen and pelvis was performed without intravenous contrast   Axial, sagittal, and coronal 2D reformatted images were created from the source data and submitted for interpretation  Radiation dose length product (DLP) for this visit:  445 mGy-cm   This examination, like all CT scans performed in the [facility], was performed utilizing techniques to minimize radiation dose exposure, including the use of iterative reconstruction and automated exposure control  Enteric contrast was administered  FINDINGS: ABDOMEN LOWER CHEST:  Stable bibasilar ill-defined opacities and tiny nodular densities with tree-in-bud appearance and bronchiectasis  Surgical clip in the medial left lung base  LIVER/BILIARY TREE:  Unremarkable  GALLBLADDER:  No calcified gallstones  No pericholecystic inflammatory change  SPLEEN:  Unremarkable  PANCREAS:  Unremarkable  ADRENAL GLANDS:  Unremarkable   KIDNEYS/URETERS:  Right ureteral double-J stent has been placed in the interval   Previously seen obstructing stone in the right UPJ no longer visualized  Previously seen right hydronephrosis has essentially resolved  And do the 2 mm nonobstructing stone is seen in the interpolar right kidney  12 mm nonobstructing stones in the left kidney  No hydronephrosis  STOMACH AND BOWEL:  Terminal ileal enteritis and proximal ascending colon and cecal colitis    APPENDIX:  There are expected postoperative changes of appendectomy  ABDOMINOPELVIC CAVITY:  No ascites or free intraperitoneal air  No lymphadenopathy  VESSELS:  Unremarkable for patient's age  PELVIS REPRODUCTIVE ORGANS:  Unremarkable for patient's age  URINARY BLADDER:  Bladder decompressed by Kim catheter with tip and balloon seen 2 mm nonobstructing stone seen in the right within the bladder    ABDOMINAL WALL/INGUINAL REGIONS: Bilateral breast augmentation  Otherwise Unremarkable  OSSEOUS STRUCTURES:  No acute fracture or destructive osseous lesion  Impression: Terminal ileal enteritis and proximal ascending colon and cecal colitis    Right ureteral double-J stent has been placed in the interval   Previously seen obstructing stone in the right UPJ no longer visualized  Previously seen right hydronephrosis has essentially resolved  2 mm nonobstructing stone is seen in the interpolar right kidney  12 mm nonobstructing stones in the left kidney  No hydronephrosis  Stable bibasilar ill-defined opacities and tiny nodular densities with tree-in-bud appearance and bronchiectasis  Surgical clip in the medial left lung base  Workstation performed: [id number]     Fl Retrograde Pyelogram    Result Date: 1/9/2020  Narrative: RIGHT RETROGRADE PYELOGRAM INDICATION:   Obstruction of right ureteropelvic junction (UPJ) due to stone  COMPARISON: CT abdomen pelvis 1/8/2020 IMAGES:  6 FLUOROSCOPY TIME:   14 seconds CONTRAST: 2 5 mL of iohexol (OMNIPAQUE) FINDINGS: Fluoroscopic guidance provided for retrograde pyelogram  Moderate hydronephrosis is present    Filling defect noted at the ureteropelvic junction, corresponding to calculus seen on CT  Nephroureteral stent was left in place  Osseous and soft tissue detail limited by technique  Impression: Fluoroscopic guidance provided for right retrograde pyelogram   Moderate hydronephrosis with filling defect at the ureteropelvic junction noted, corresponding to obstructing calculus on CT  Nephroureteral was left in place  Please see procedure report for further details  Workstation performed: [id number]     Ct Renal Stone Study Abdomen Pelvis Wo Contrast    Result Date: 2/3/2020  Narrative: CT ABDOMEN AND PELVIS WITHOUT IV CONTRAST - LOW DOSE RENAL STONE INDICATION:   Flank pain, kidney stone suspected  COMPARISON:  1/10/2020 TECHNIQUE:  Low dose thin section CT examination of the abdomen and pelvis was performed without intravenous or oral contrast according to a protocol specifically designed to evaluate for urinary tract calculus  Axial, sagittal, and coronal 2D reformatted images were created from the source data and submitted for interpretation  Evaluation for pathology in the abdomen and pelvis that is unrelated to urinary tract calculi is limited  Radiation dose length product (DLP) for this visit:  243 27 mGy-cm   This examination, like all CT scans performed in the [facility], was performed utilizing techniques to minimize radiation dose exposure, including the use of iterative  reconstruction and automated exposure control  FINDINGS: RIGHT KIDNEY AND URETER: Double-J stent in the right renal collecting system noted without hydronephrosis  A few intrarenal calculi noted, the larger one measuring 8 mm on image 48, series 2 is significantly increased in size from study performed a few weeks ago, previously measuring 2 mm  The smaller 3 mm calculus on image 53 is also new from the prior study  There is thickening of the proximal to mid ureter adjacent to the stent, nonspecific    Periureteral strandy densities noted  No definite ureteral calculus  LEFT KIDNEY AND URETER: Large nonobstructing left lower pole renal calculus image 53, series 2 measures 1 cm, stable from the prior study  No hydronephrosis  URINARY BLADDER: Distal end of the right-sided double-J stent is noted in the bladder  No bladder calculi detected  Bronchiectatic changes with fine reticular interstitial densities are stable  Mild chambered densities at the left lung base medially are diminished slightly from the prior study  No new parenchymal opacity  Pleural calcification versus 5 mm calcified  granuloma right lower lobe image 17, series 2 again noted Limited low radiation dose noncontrast CT evaluation demonstrates no clinically significant abnormality of liver, spleen, pancreas, or adrenal glands  Clip at the GE junction noted image 1, series 2  The gallbladder is surgically absent  No ascites or bulky lymphadenopathy on this limited noncontrast study  There is a large amount of retained colonic stool in the right hemicolon and transverse colon  Moderate stool in the left hemicolon and sigmoid colon  Rectum is relatively decompressed  Limited evaluation demonstrates no evidence to suggest acute appendicitis  No acute fracture or destructive osseous lesion is identified  Impression: 1  No hydronephrosis  2   Double-J stent right renal collecting system redemonstrated  There is mild ureteral wall thickening and periureteral haziness possibly related to postoperative changes  Superimposed urinary tract infection/ureteritis not excluded  Further clinical  assessment recommended  3   Bilateral intrarenal calculi, nonobstructing, increased in size on the right  4   Constipation  Workstation performed: [id number]     Ct Abdomen Pelvis With Contrast    Result Date: 1/8/2020  Narrative: CT ABDOMEN AND PELVIS WITH IV CONTRAST INDICATION:   Right flank and RLQ abd pain  COMPARISON:  CT chest on 11/12/2019   TECHNIQUE:  CT examination of the abdomen and pelvis was performed  Axial, sagittal, and coronal 2D reformatted images were created from the source data and submitted for interpretation  Radiation dose length product (DLP) for this visit:  473 63 mGy-cm   This examination, like all CT scans performed in the [facility], was performed utilizing techniques to minimize radiation dose exposure, including the use of iterative  reconstruction and automated exposure control  IV Contrast:  100 mL of iohexol (OMNIPAQUE) Enteric Contrast:  Enteric contrast was not administered  FINDINGS: ABDOMEN LOWER CHEST:  Grossly stable bibasilar ill-defined opacities and tiny nodular densities with tree-in-bud appearance and bronchiectasis  Surgical clip in the medial left lung base  Partially imaged median sternotomy wires and bilateral breast implants  LIVER/BILIARY TREE:  Stable 1 2 cm low-density in the anterior aspect of the medial segment left lobe subjacent to the hepatic capsule  GALLBLADDER:  Gallbladder is surgically absent  SPLEEN:  Unremarkable  PANCREAS:  Complete fatty replacement of the pancreas  ADRENAL GLANDS:  Unremarkable  KIDNEYS/URETERS:  8 mm calculus at the right UPJ causing mild hydronephrosis  Nonobstructing 2 mm calculus in the right renal mid pole and nonobstructing 12 mm calculus in the left renal lower pole  STOMACH AND BOWEL:  Limited evaluation without enteric contrast   No bowel obstruction  Suggestion of mild wall thickening of the ascending colon, hepatic flexure and transverse colon  Surgical clips in the region of the base of the cecum  APPENDIX:  No findings to suggest appendicitis  ABDOMINOPELVIC CAVITY:  No ascites or free intraperitoneal air  No lymphadenopathy  VESSELS:  Venous collaterals are present in the left upper quadrant  PELVIS REPRODUCTIVE ORGANS:  Unremarkable for patient's age  URINARY BLADDER:  Unremarkable  ABDOMINAL WALL/INGUINAL REGIONS:  Unremarkable   OSSEOUS STRUCTURES:  No acute fracture or destructive osseous lesion  Impression: 1  Mild right hydronephrosis secondary to 8 mm calculus at the right UPJ  2   Nonobstructing bilateral renal calculi  3   Suggestion of mild wall thickening of the ascending colon, hepatic flexure and transverse colon probably related to underdistention, however correlate for colitis  4   Grossly stable bibasilar nodular densities with tree in bud appearance and bronchiectasis  The study was marked in [facility] [place] for immediate notification  Workstation performed: [id number]         Portions of the record may have been created with voice recognition software  Occasional wrong word or "sound a like" substitutions may have occurred due to the inherent limitations of voice recognition software  Read the chart carefully and recognize, using context, where substitutions have occurred

## 2020-02-04 NOTE — ASSESSMENT & PLAN NOTE
· Urinalysis positive for bacteria and cystine crystals  Was given antibiotics in the ED  · Urology following patient    Cystoscopy with lithotripsy and stent placement today  · Stent to be removed in 3 to 5 days   · Stable for dc tomorrow if no lung issues

## 2020-02-04 NOTE — ASSESSMENT & PLAN NOTE
· Continue Prograf, prednisone  · Patient can be discharged tomorrow as long as no respiratory issues over night

## 2020-02-04 NOTE — ED NOTES
PT vomiting, zofran given at 7:50  Will notify provider of zofran unsuccessful at preventing vomiting   Unable to administer morning medications at this time     Dung SUSIE Lawton  02/04/20 7691

## 2020-02-05 VITALS
SYSTOLIC BLOOD PRESSURE: 128 MMHG | HEART RATE: 82 BPM | DIASTOLIC BLOOD PRESSURE: 82 MMHG | BODY MASS INDEX: 20.57 KG/M2 | HEIGHT: 62 IN | RESPIRATION RATE: 18 BRPM | OXYGEN SATURATION: 94 % | TEMPERATURE: 98.2 F | WEIGHT: 111.77 LBS

## 2020-02-05 PROBLEM — R11.2 NAUSEA AND VOMITING: Status: RESOLVED | Noted: 2020-02-04 | Resolved: 2020-02-05

## 2020-02-05 LAB
GLUCOSE SERPL-MCNC: 108 MG/DL (ref 65–140)
PLATELET # BLD AUTO: 168 THOUSANDS/UL (ref 149–390)
PMV BLD AUTO: 8.8 FL (ref 8.9–12.7)

## 2020-02-05 PROCEDURE — 99217 PR OBSERVATION CARE DISCHARGE MANAGEMENT: CPT | Performed by: INTERNAL MEDICINE

## 2020-02-05 PROCEDURE — 82948 REAGENT STRIP/BLOOD GLUCOSE: CPT

## 2020-02-05 PROCEDURE — 85049 AUTOMATED PLATELET COUNT: CPT | Performed by: UROLOGY

## 2020-02-05 PROCEDURE — 99225 PR SBSQ OBSERVATION CARE/DAY 25 MINUTES: CPT | Performed by: NURSE PRACTITIONER

## 2020-02-05 RX ORDER — CIPROFLOXACIN 500 MG/1
500 TABLET, FILM COATED ORAL EVERY 12 HOURS SCHEDULED
Qty: 6 TABLET | Refills: 0 | Status: SHIPPED | OUTPATIENT
Start: 2020-02-05 | End: 2020-02-08

## 2020-02-05 RX ORDER — PHENAZOPYRIDINE HYDROCHLORIDE 100 MG/1
100 TABLET, FILM COATED ORAL
Qty: 4 TABLET | Refills: 0 | Status: SHIPPED | OUTPATIENT
Start: 2020-02-05 | End: 2020-02-07

## 2020-02-05 RX ORDER — OXYBUTYNIN CHLORIDE 5 MG/1
10 TABLET, EXTENDED RELEASE ORAL DAILY
Status: DISCONTINUED | OUTPATIENT
Start: 2020-02-05 | End: 2020-02-05 | Stop reason: HOSPADM

## 2020-02-05 RX ORDER — OXYBUTYNIN CHLORIDE 10 MG/1
10 TABLET, EXTENDED RELEASE ORAL DAILY
Qty: 5 TABLET | Refills: 0 | Status: SHIPPED | OUTPATIENT
Start: 2020-02-05 | End: 2020-03-07 | Stop reason: HOSPADM

## 2020-02-05 RX ADMIN — OXYBUTYNIN CHLORIDE 10 MG: 5 TABLET, EXTENDED RELEASE ORAL at 10:00

## 2020-02-05 RX ADMIN — Medication 1 TABLET: at 08:27

## 2020-02-05 RX ADMIN — PHENAZOPYRIDINE 100 MG: 100 TABLET ORAL at 08:27

## 2020-02-05 RX ADMIN — OXYCODONE HYDROCHLORIDE 10 MG: 10 TABLET ORAL at 04:45

## 2020-02-05 RX ADMIN — PHENAZOPYRIDINE 100 MG: 100 TABLET ORAL at 11:33

## 2020-02-05 RX ADMIN — HEPARIN SODIUM 5000 UNITS: 5000 INJECTION INTRAVENOUS; SUBCUTANEOUS at 06:30

## 2020-02-05 RX ADMIN — FERROUS SULFATE TAB 325 MG (65 MG ELEMENTAL FE) 325 MG: 325 (65 FE) TAB at 08:27

## 2020-02-05 RX ADMIN — HYDROMORPHONE HYDROCHLORIDE 0.5 MG: 1 INJECTION, SOLUTION INTRAMUSCULAR; INTRAVENOUS; SUBCUTANEOUS at 06:34

## 2020-02-05 RX ADMIN — HYDROMORPHONE HYDROCHLORIDE 0.5 MG: 1 INJECTION, SOLUTION INTRAMUSCULAR; INTRAVENOUS; SUBCUTANEOUS at 00:34

## 2020-02-05 RX ADMIN — VALGANCICLOVIR 900 MG: 450 TABLET, FILM COATED ORAL at 08:30

## 2020-02-05 RX ADMIN — DRONABINOL 5 MG: 2.5 CAPSULE ORAL at 11:33

## 2020-02-05 RX ADMIN — OXYCODONE HYDROCHLORIDE 5 MG: 5 TABLET ORAL at 10:21

## 2020-02-05 RX ADMIN — DRONABINOL 5 MG: 2.5 CAPSULE ORAL at 08:00

## 2020-02-05 RX ADMIN — ATOVAQUONE 750 MG: 750 SUSPENSION ORAL at 08:30

## 2020-02-05 RX ADMIN — ALPRAZOLAM 1 MG: 0.5 TABLET ORAL at 08:27

## 2020-02-05 RX ADMIN — TACROLIMUS 2 MG: 0.5 CAPSULE ORAL at 08:27

## 2020-02-05 RX ADMIN — PANCRELIPASE 48000 UNITS: 24000; 76000; 120000 CAPSULE, DELAYED RELEASE PELLETS ORAL at 08:26

## 2020-02-05 RX ADMIN — MAGNESIUM OXIDE TAB 400 MG (241.3 MG ELEMENTAL MG) 400 MG: 400 (241.3 MG) TAB at 08:27

## 2020-02-05 RX ADMIN — PREDNISONE 10 MG: 10 TABLET ORAL at 08:27

## 2020-02-05 RX ADMIN — PANTOPRAZOLE SODIUM 40 MG: 40 TABLET, DELAYED RELEASE ORAL at 08:27

## 2020-02-05 NOTE — PROGRESS NOTES
Progress Note Fanny Fortune 45 y o  female MRN: [id number]    Unit/Bed#: -01 Encounter: [id number]      Assessment:  Shannan Jones is a medically complex 43-year-old female with history of cystic fibrosis status post lung transplantation x2, seen last month for renal colic secondary to right nephro-ureteral lithiasis status post cystoscopy and ureteral stent placement  She re-presented to [facility] yesterday with significant flank pain  Now postoperative day 1 cystoscopy, retrograde pyelography, ureteroscopy, laser lithotripsy and right ureteral stent placement  Procedure went well without adverse event or complication  She is afebrile and normotensive  WBC and creatinine within normal limits    Plan:   stable for discharge at the discretion of Internal Medicine seen  Stent is on string  Do not remove  Intended duration for 3--5 days  Stent colic can be treated with hydration, bowel regimen, anti inflammatory, anti spasmodic and modest narcotics  Our service will contact patient to schedule follow-up for stent retrieval     Subjective:   Denies fever, chills, flank, abdominal or suprapubic pain  Endorses mild dysuria with urination  Objective:     Vitals: Blood pressure 128/82, pulse 82, temperature 98 2 °F (36 8 °C), temperature source Oral, resp  rate 18, height 5' 2" (1 575 m), weight 50 7 kg (111 lb 12 4 oz), last menstrual period 02/02/2020, SpO2 94 %, not currently breastfeeding  ,Body mass index is 20 44 kg/m²        Intake/Output Summary (Last 24 hours) at 2/5/2020 0910  Last data filed at 2/5/2020 0449  Gross per 24 hour   Intake 1373 33 ml   Output 620 ml   Net 753 33 ml       Physical Exam: General appearance: alert and oriented, in no acute distress, appears stated age, cooperative and no distress  Head: Normocephalic, without obvious abnormality, atraumatic  Neck: no adenopathy, no carotid bruit, no JVD, supple, symmetrical, trachea midline and thyroid not enlarged, symmetric, no tenderness/mass/nodules  Lungs: diminished breath sounds  Heart: regular rate and rhythm, S1, S2 normal, no murmur, click, rub or gallop  Abdomen: soft, non-tender; bowel sounds normal; no masses,  no organomegaly  Extremities: extremities normal, warm and well-perfused; no cyanosis, clubbing, or edema  Pulses: 2+ and symmetric  Neurologic: Grossly normal  Stent on string     Invasive Devices     Central Venous Catheter Line            Port A Cath Right Chest -- days          Peripheral Intravenous Line            Peripheral IV 02/03/20 Left Forearm 1 day          Drain            Ureteral Drain/Stent Right ureter 6 Fr  less than 1 day              Lab Results   Component Value Date    WBC 6 72 02/04/2020    HGB 13 0 02/04/2020    HCT 39 7 02/04/2020    MCV 96 02/04/2020     02/05/2020       Lab Results   Component Value Date    SODIUM 139 02/04/2020    K 4 0 02/04/2020     02/04/2020    CO2 25 02/04/2020    BUN 26 (H) 02/04/2020    CREATININE 0 97 02/04/2020    GLUC 123 02/04/2020    CALCIUM 8 9 02/04/2020       Lab, Imaging and other studies: I have personally reviewed pertinent reports

## 2020-02-05 NOTE — DISCHARGE SUMMARY
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  · Outpatient follow-up    History of lung transplant ([facility] [address] )  Assessment & Plan  · Continue Prograf, prednisone  · Currently stable    * Ureterolithiasis  Assessment & Plan  · Urinalysis positive for bacteria and cystine crystals  Was given antibiotics in the ED  · Urology following patient  Cystoscopy with lithotripsy and stent placement today  · Stent to be removed in 3 to 5 days   · Discussed personally with Urology    Cleared for discharge          Discharging Physician / Practitioner: Michael Parrish MD  PCP: Galileo De La Torre MD  Admission Date:   Admission Orders (From admission, onward)     Ordered        02/03/20 2215  Place in Observation  Once                   Discharge Date: 02/05/20    Resolved Problems  Date Reviewed: 2/5/2020          Resolved    Nausea and vomiting 2/5/2020     Resolved by  Michael Parrish MD          Consultations During Hospital Stay:  · Urology    Procedures Performed:   · Cystoscopy and lithotripsy, stent placement    Significant Findings / Test Results:   ·     Incidental Findings:   ·      Test Results Pending at Discharge (will require follow up):   ·      Outpatient Tests Requested:  · Follow-up with urology and stent removal  · Follow-up with PCP    Complications:      Reason for Admission:  Right flank pain    Hospital Course:     HPI on admission-  Rea Judd is a 45 y o  female who presents with right flank pain and kidney stone  She has history significant for pancreatic insufficiency, cystic fibrosis, and bilateral lung transplant  Patient was diagnosed with a right sided kidney stone about 3 weeks ago and was treated with stent placement by Urology  However, for the past 5 days she has been experiencing intractable pain not controlled by oxycodone  It is sharp, severe, radiating to her groin, and more frequent than before  She reports associated nausea but no vomiting  CT scan did reveal double-J stent placement and no hydronephrosis, but did show ureteral thickening and bilateral intrarenal calculi  Was given antibiotics and IV pain medication in the ED  Case was discussed with Urology who recommended admission to Medicine with urology consult      Currently, patient is awake, alert, no respiratory distress  Appears comfortable on exam   Pain currently improved  Hospital course-  Patient was evaluated by Urology  Had cystoscopy and stent placement done yesterday  I discussed personally with Urology today  Cleared for discharge  Labs no leukocytosis  Patient afebrile  Renal function normal   No chest pain or shortness of breath  Patient will follow-up with urology as outpatient in 3-5 days  Stent will be removed  Please see above list of diagnoses and related plan for additional information       Condition at Discharge: good     Discharge Day Visit / Exam:     Subjective:    Vitals: Blood Pressure: 128/82 (02/05/20 0700)  Pulse: 82 (02/05/20 0700)  Temperature: 98 2 °F (36 8 °C) (02/05/20 0700)  Temp Source: Oral (02/05/20 0700)  Respirations: 18 (02/05/20 0700)  Height: 5' 2" (157 5 cm) (02/04/20 1301)  Weight - Scale: 50 7 kg (111 lb 12 4 oz) (02/03/20 1628)  SpO2: 94 % (02/05/20 0700)  Exam:   Physical Exam  General- Awake, alert and oriented x 3, looks comfortable  CVS- Normal S1/ S2, Regular rate and rhythm  Respiratory system- B/L clear breath sounds, no wheezing  Abdomen- Soft, Non distended, no tenderness, Bowel sound- present  Genitourinary- No suprapubic tenderness  CNS- No acute focal neurologic deficit noted    Discussion with Family:     Discharge instructions/Information to patient and family:   See after visit summary for information provided to patient and family  Provisions for Follow-Up Care:  See after visit summary for information related to follow-up care and any pertinent home health orders  Disposition:     Home    For Discharges to   [address] SNF:   · Not Applicable to this Patient - Not Applicable to this Patient    Planned Readmission:      Discharge Statement:  I spent 25 minutes discharging the patient  This time was spent on the day of discharge  I had direct contact with the patient on the day of discharge  Greater than 50% of the total time was spent examining patient, answering all patient questions, arranging and discussing plan of care with patient as well as directly providing post-discharge instructions  Additional time then spent on discharge activities  Discharge Medications:  See after visit summary for reconciled discharge medications provided to patient and family        ** Please Note: This note has been constructed using a voice recognition system **

## 2020-02-05 NOTE — ASSESSMENT & PLAN NOTE
Patient has elevated A1cs in the past, however, she is also chronically on prednisone  · Follow-up with PCP  · Hemoglobin A1c 7 5    At home patient on sliding scale

## 2020-02-05 NOTE — DISCHARGE INSTR - AVS FIRST PAGE
Follow-up with PCP in 1 week  Follow-up with urology as outpatient in 3-5 days  Comes back to the emergency room if condition worsen or recur

## 2020-02-05 NOTE — UTILIZATION REVIEW
Continued Stay Review    Date:2/5                          Current Patient Class: OBS  Current Level of Care: MS    HPI:44 y o  female initially admitted on 2/3    Assessment/Plan: admitted w/ ureterolithiasis to OR 2/4 for Cystoscopy with lithotripsy and stent placement today  Cont to require IV dilaudid for pain control   2/4 OP note   CYSTOSCOPY URETEROSCOPY WITH LITHOTRIPSY HOLMIUM LASER, RETROGRADE PYELOGRAM AND INSERTION STENT URETERAL   Operative Findings:  1   Lower pole renal stone, fragmented and removed  2  6x26 RIGHT stent placed with string     Pertinent Labs/Diagnostic Results:   Results from last 7 days   Lab Units 02/05/20  0426 02/04/20  0450 02/03/20  1830   WBC Thousand/uL  --  6 72 9 52   HEMOGLOBIN g/dL  --  13 0 13 1   HEMATOCRIT %  --  39 7 39 1   PLATELETS Thousands/uL 168 174 181   NEUTROS ABS Thousands/µL  --   --  7 62     Results from last 7 days   Lab Units 02/04/20  0450 02/03/20  1830   SODIUM mmol/L 139 137   POTASSIUM mmol/L 4 0 4 1   CHLORIDE mmol/L 104 103   CO2 mmol/L 25 20*   ANION GAP mmol/L 10 14*   BUN mg/dL 26* 26*   CREATININE mg/dL 0 97 1 10   EGFR ml/min/1 73sq m 72 62   CALCIUM mg/dL 8 9 9 4     Results from last 7 days   Lab Units 02/03/20  1830   AST U/L 15   ALT U/L 33   ALK PHOS U/L 90   TOTAL PROTEIN g/dL 8 2   ALBUMIN g/dL 3 9   TOTAL BILIRUBIN mg/dL 0 30     Results from last 7 days   Lab Units 02/05/20  0604 02/04/20  2050 02/04/20  1105   POC GLUCOSE mg/dl 108 201* 112     Results from last 7 days   Lab Units 02/04/20  0450 02/03/20  1830   GLUCOSE RANDOM mg/dL 123 158*     Results from last 7 days   Lab Units 02/04/20  0450   HEMOGLOBIN A1C % 7 5*   EAG mg/dl 169     Results from last 7 days   Lab Units 02/03/20  1732   CLARITY UA  Clear   COLOR UA  Yellow   SPEC GRAV UA  1 020   PH UA  6 0   GLUCOSE UA mg/dl 250 (1/4%)*   KETONES UA mg/dl Negative   BLOOD UA  Large*   PROTEIN UA mg/dl 100 (2+)*   NITRITE UA  Negative   BILIRUBIN UA  Negative   UROBILINOGEN UA E U /dl 0 2   LEUKOCYTES UA  Small*   WBC UA /hpf 4-10*   RBC UA /hpf 10-20*   BACTERIA UA /hpf Occasional   EPITHELIAL CELLS WET PREP /hpf Occasional       Vital Signs:   02/05/20 0700  98 2 °F (36 8 °C)  82  18  128/82  101  94 %  None (Room air         Medications:   Scheduled Medications:    Medications:  atovaquone 750 mg Oral Daily   dronabinol 5 mg Oral TID AC   ferrous sulfate 325 mg Oral BID With Meals   heparin (porcine) 5,000 Units Subcutaneous Q8H [address]   insulin lispro 1-5 Units Subcutaneous Q6H [address]   magnesium oxide 400 mg Oral BID   multivitamin-minerals 1 tablet Oral Daily   pancrelipase (Lip-Prot-Amyl) 48,000 Units Oral TID With Meals   pantoprazole 40 mg Oral Daily   phenazopyridine 100 mg Oral TID With Meals   predniSONE 10 mg Oral Daily   tacrolimus 2 mg Oral Q12H DOTTY   tamsulosin 0 4 mg Oral Daily With Dinner   valGANciclovir 900 mg Oral Daily With Breakfast     Continuous IV Infusions:     PRN Meds:    acetaminophen 650 mg Oral Q6H PRN   albuterol 1 25 mg Nebulization Q4H PRN   ALPRAZolam 1 mg Oral BID PRN   gabapentin 100 mg Oral BID PRN   HYDROmorphone 0 5 mg Intravenous Q1H PRN x2   ondansetron 4 mg Intravenous Q6H PRN   oxyCODONE 10 mg Oral Q4H PRN   oxyCODONE 5 mg Oral Q4H PRN       Discharge Plan: TBD     Network Utilization Review Department  [email or web address]  ATTENTION: Please call with any questions or concerns to [phone] and carefully listen to the prompts so that you are directed to the right person  All voicemails are confidential   Howie Merino all requests for admission clinical reviews, approved or denied determinations and any other requests to dedicated fax number below belonging to the campus where the patient is receiving treatment   List of dedicated fax numbers for the Facilities:  [address] DENIALS (Administrative/Medical Necessity) [phone]   [address] [place] [address] (Maternity/NICU/Pediatrics) [phone]     [facility] [phone]   [facility] [phone]   [facility] Standing [phone]   [facility] [address] [facility] [phone]   [facility]  [phone]   [address] [facility] [address] [facility]  [address] Formerly named [facility] [address] [facility] [phone]

## 2020-02-05 NOTE — ASSESSMENT & PLAN NOTE
· Urinalysis positive for bacteria and cystine crystals  Was given antibiotics in the ED  · Urology following patient  Cystoscopy with lithotripsy and stent placement today  · Stent to be removed in 3 to 5 days   · Discussed personally with Urology    Cleared for discharge

## 2020-02-06 NOTE — TELEPHONE ENCOUNTER
Called and left message for patient to call the office back to scheduled appointment for stent removal  Office number was left in the message

## 2020-02-07 NOTE — TELEPHONE ENCOUNTER
Called and spoke to patient  Patient scheduled for stent removal for Monday at 10:30 at the [facility] office

## 2020-02-08 LAB — BACTERIA UR CULT: ABNORMAL

## 2020-02-10 ENCOUNTER — TELEPHONE (OUTPATIENT)
Dept: UROLOGY | Facility: MEDICAL CENTER | Age: 45
End: 2020-02-10

## 2020-02-10 ENCOUNTER — PROCEDURE VISIT (OUTPATIENT)
Dept: UROLOGY | Facility: CLINIC | Age: 45
End: 2020-02-10
Payer: COMMERCIAL

## 2020-02-10 VITALS
BODY MASS INDEX: 20.3 KG/M2 | SYSTOLIC BLOOD PRESSURE: 120 MMHG | WEIGHT: 114.6 LBS | HEART RATE: 70 BPM | DIASTOLIC BLOOD PRESSURE: 72 MMHG | HEIGHT: 63 IN

## 2020-02-10 DIAGNOSIS — N20.1 URETEROLITHIASIS: Primary | ICD-10-CM

## 2020-02-10 DIAGNOSIS — N20.0 NEPHROLITHIASIS: ICD-10-CM

## 2020-02-10 PROCEDURE — 99211 OFF/OP EST MAY X REQ PHY/QHP: CPT

## 2020-02-10 RX ORDER — CEFUROXIME AXETIL 250 MG/1
250 TABLET ORAL EVERY 12 HOURS SCHEDULED
Qty: 6 TABLET | Refills: 0 | Status: SHIPPED | OUTPATIENT
Start: 2020-02-10 | End: 2020-02-13

## 2020-02-10 NOTE — TELEPHONE ENCOUNTER
Patient of Dr Moy Martel at the [facility] office  Received call from [place] at [facility] to report an allergy to [address] on patient's file  Please call at [phone] to discuss an alternative  Thank you

## 2020-02-19 LAB
COLOR STONE: NORMAL
COM MFR STONE: 100 %
COMMENT-STONE3: NORMAL
COMPOSITION: NORMAL
LABORATORY COMMENT REPORT: NORMAL
PHOTO: NORMAL
SIZE STONE: NORMAL MM
SPEC SOURCE SUBJ: NORMAL
STONE ANALYSIS-IMP: NORMAL
WT STONE: 87.9 MG

## 2020-02-22 ENCOUNTER — APPOINTMENT (EMERGENCY)
Dept: RADIOLOGY | Facility: HOSPITAL | Age: 45
End: 2020-02-22
Payer: COMMERCIAL

## 2020-02-22 ENCOUNTER — HOSPITAL ENCOUNTER (EMERGENCY)
Facility: HOSPITAL | Age: 45
Discharge: ANOTHER HOSPITAL | End: 2020-02-22
Attending: EMERGENCY MEDICINE | Admitting: EMERGENCY MEDICINE
Payer: COMMERCIAL

## 2020-02-22 VITALS
BODY MASS INDEX: 20.32 KG/M2 | HEART RATE: 76 BPM | TEMPERATURE: 98.1 F | OXYGEN SATURATION: 95 % | RESPIRATION RATE: 20 BRPM | SYSTOLIC BLOOD PRESSURE: 105 MMHG | DIASTOLIC BLOOD PRESSURE: 58 MMHG | WEIGHT: 112.88 LBS

## 2020-02-22 DIAGNOSIS — R09.02 HYPOXIA: Primary | ICD-10-CM

## 2020-02-22 LAB
ALBUMIN SERPL BCP-MCNC: 3.7 G/DL (ref 3.5–5)
ALP SERPL-CCNC: 93 U/L (ref 46–116)
ALT SERPL W P-5'-P-CCNC: 54 U/L (ref 12–78)
ANION GAP SERPL CALCULATED.3IONS-SCNC: 11 MMOL/L (ref 4–13)
APTT PPP: 27 SECONDS (ref 23–37)
AST SERPL W P-5'-P-CCNC: 31 U/L (ref 5–45)
BASOPHILS # BLD AUTO: 0.03 THOUSANDS/ΜL (ref 0–0.1)
BASOPHILS NFR BLD AUTO: 0 % (ref 0–1)
BILIRUB SERPL-MCNC: 0.4 MG/DL (ref 0.2–1)
BUN SERPL-MCNC: 23 MG/DL (ref 5–25)
CALCIUM SERPL-MCNC: 8.5 MG/DL (ref 8.3–10.1)
CHLORIDE SERPL-SCNC: 104 MMOL/L (ref 100–108)
CO2 SERPL-SCNC: 24 MMOL/L (ref 21–32)
CREAT SERPL-MCNC: 1.11 MG/DL (ref 0.6–1.3)
EOSINOPHIL # BLD AUTO: 0.07 THOUSAND/ΜL (ref 0–0.61)
EOSINOPHIL NFR BLD AUTO: 1 % (ref 0–6)
ERYTHROCYTE [DISTWIDTH] IN BLOOD BY AUTOMATED COUNT: 13.5 % (ref 11.6–15.1)
FLUAV RNA NPH QL NAA+PROBE: ABNORMAL
FLUBV RNA NPH QL NAA+PROBE: DETECTED
GFR SERPL CREATININE-BSD FRML MDRD: 61 ML/MIN/1.73SQ M
GLUCOSE SERPL-MCNC: 115 MG/DL (ref 65–140)
HCT VFR BLD AUTO: 43.7 % (ref 34.8–46.1)
HGB BLD-MCNC: 13.6 G/DL (ref 11.5–15.4)
IMM GRANULOCYTES # BLD AUTO: 0.05 THOUSAND/UL (ref 0–0.2)
IMM GRANULOCYTES NFR BLD AUTO: 1 % (ref 0–2)
INR PPP: 0.95 (ref 0.84–1.19)
LACTATE SERPL-SCNC: 1.5 MMOL/L (ref 0.5–2)
LYMPHOCYTES # BLD AUTO: 1.18 THOUSANDS/ΜL (ref 0.6–4.47)
LYMPHOCYTES NFR BLD AUTO: 18 % (ref 14–44)
MCH RBC QN AUTO: 31.2 PG (ref 26.8–34.3)
MCHC RBC AUTO-ENTMCNC: 31.1 G/DL (ref 31.4–37.4)
MCV RBC AUTO: 100 FL (ref 82–98)
MONOCYTES # BLD AUTO: 0.43 THOUSAND/ΜL (ref 0.17–1.22)
MONOCYTES NFR BLD AUTO: 6 % (ref 4–12)
NEUTROPHILS # BLD AUTO: 4.95 THOUSANDS/ΜL (ref 1.85–7.62)
NEUTS SEG NFR BLD AUTO: 74 % (ref 43–75)
NRBC BLD AUTO-RTO: 0 /100 WBCS
PLATELET # BLD AUTO: 170 THOUSANDS/UL (ref 149–390)
PMV BLD AUTO: 9.7 FL (ref 8.9–12.7)
POTASSIUM SERPL-SCNC: 4.1 MMOL/L (ref 3.5–5.3)
PROT SERPL-MCNC: 8.3 G/DL (ref 6.4–8.2)
PROTHROMBIN TIME: 12.6 SECONDS (ref 11.6–14.5)
RBC # BLD AUTO: 4.36 MILLION/UL (ref 3.81–5.12)
RSV RNA NPH QL NAA+PROBE: ABNORMAL
S PYO DNA THROAT QL NAA+PROBE: NORMAL
SODIUM SERPL-SCNC: 139 MMOL/L (ref 136–145)
TROPONIN I SERPL-MCNC: <0.02 NG/ML
WBC # BLD AUTO: 6.71 THOUSAND/UL (ref 4.31–10.16)

## 2020-02-22 PROCEDURE — 84484 ASSAY OF TROPONIN QUANT: CPT | Performed by: EMERGENCY MEDICINE

## 2020-02-22 PROCEDURE — 99285 EMERGENCY DEPT VISIT HI MDM: CPT | Performed by: EMERGENCY MEDICINE

## 2020-02-22 PROCEDURE — 96375 TX/PRO/DX INJ NEW DRUG ADDON: CPT

## 2020-02-22 PROCEDURE — 87631 RESP VIRUS 3-5 TARGETS: CPT | Performed by: EMERGENCY MEDICINE

## 2020-02-22 PROCEDURE — 85025 COMPLETE CBC W/AUTO DIFF WBC: CPT | Performed by: EMERGENCY MEDICINE

## 2020-02-22 PROCEDURE — 84145 PROCALCITONIN (PCT): CPT | Performed by: EMERGENCY MEDICINE

## 2020-02-22 PROCEDURE — 80053 COMPREHEN METABOLIC PANEL: CPT | Performed by: EMERGENCY MEDICINE

## 2020-02-22 PROCEDURE — 96367 TX/PROPH/DG ADDL SEQ IV INF: CPT

## 2020-02-22 PROCEDURE — 96376 TX/PRO/DX INJ SAME DRUG ADON: CPT

## 2020-02-22 PROCEDURE — 36415 COLL VENOUS BLD VENIPUNCTURE: CPT | Performed by: EMERGENCY MEDICINE

## 2020-02-22 PROCEDURE — 87651 STREP A DNA AMP PROBE: CPT | Performed by: EMERGENCY MEDICINE

## 2020-02-22 PROCEDURE — 83605 ASSAY OF LACTIC ACID: CPT | Performed by: EMERGENCY MEDICINE

## 2020-02-22 PROCEDURE — 96365 THER/PROPH/DIAG IV INF INIT: CPT

## 2020-02-22 PROCEDURE — 93005 ELECTROCARDIOGRAM TRACING: CPT

## 2020-02-22 PROCEDURE — 85610 PROTHROMBIN TIME: CPT | Performed by: EMERGENCY MEDICINE

## 2020-02-22 PROCEDURE — 71046 X-RAY EXAM CHEST 2 VIEWS: CPT

## 2020-02-22 PROCEDURE — 85730 THROMBOPLASTIN TIME PARTIAL: CPT | Performed by: EMERGENCY MEDICINE

## 2020-02-22 PROCEDURE — 99285 EMERGENCY DEPT VISIT HI MDM: CPT

## 2020-02-22 PROCEDURE — 87040 BLOOD CULTURE FOR BACTERIA: CPT | Performed by: EMERGENCY MEDICINE

## 2020-02-22 RX ORDER — HYDROMORPHONE HCL/PF 1 MG/ML
0.5 SYRINGE (ML) INJECTION ONCE
Status: COMPLETED | OUTPATIENT
Start: 2020-02-22 | End: 2020-02-22

## 2020-02-22 RX ORDER — ONDANSETRON 2 MG/ML
4 INJECTION INTRAMUSCULAR; INTRAVENOUS ONCE
Status: COMPLETED | OUTPATIENT
Start: 2020-02-22 | End: 2020-02-22

## 2020-02-22 RX ORDER — LINEZOLID 2 MG/ML
600 INJECTION, SOLUTION INTRAVENOUS ONCE
Status: COMPLETED | OUTPATIENT
Start: 2020-02-22 | End: 2020-02-22

## 2020-02-22 RX ORDER — HYDROMORPHONE HCL/PF 1 MG/ML
1 SYRINGE (ML) INJECTION ONCE
Status: COMPLETED | OUTPATIENT
Start: 2020-02-22 | End: 2020-02-22

## 2020-02-22 RX ADMIN — HYDROMORPHONE HYDROCHLORIDE 0.5 MG: 1 INJECTION, SOLUTION INTRAMUSCULAR; INTRAVENOUS; SUBCUTANEOUS at 11:01

## 2020-02-22 RX ADMIN — LINEZOLID 600 MG: 600 INJECTION, SOLUTION INTRAVENOUS at 11:01

## 2020-02-22 RX ADMIN — HYDROMORPHONE HYDROCHLORIDE 0.5 MG: 1 INJECTION, SOLUTION INTRAMUSCULAR; INTRAVENOUS; SUBCUTANEOUS at 13:22

## 2020-02-22 RX ADMIN — HYDROMORPHONE HYDROCHLORIDE 0.5 MG: 1 INJECTION, SOLUTION INTRAMUSCULAR; INTRAVENOUS; SUBCUTANEOUS at 16:10

## 2020-02-22 RX ADMIN — ONDANSETRON 4 MG: 2 INJECTION INTRAMUSCULAR; INTRAVENOUS at 11:01

## 2020-02-22 RX ADMIN — HYDROMORPHONE HYDROCHLORIDE 1 MG: 1 INJECTION, SOLUTION INTRAMUSCULAR; INTRAVENOUS; SUBCUTANEOUS at 17:59

## 2020-02-22 RX ADMIN — MEROPENEM 2000 MG: 1 INJECTION, POWDER, FOR SOLUTION INTRAVENOUS at 13:00

## 2020-02-22 NOTE — EMTALA/ACUTE CARE TRANSFER
[address] [facility] [address]  [address]  [place] [address]  Dept: [phone]      EMTALA TRANSFER CONSENT    NAME Yelitza Fortune                                         1975                              MRN [id number]    I have been informed of my rights regarding examination, treatment, and transfer   by Dr Salvatore Diaz MD    Benefits: Specialized equipment and/or services available at the receiving facility (Include comment)________________________    Risks: Potential for delay in receiving treatment      Transfer Request   I acknowledge that my medical condition has been evaluated and explained to me by the emergency department physician or other qualified medical person and/or my attending physician who has recommended and offered to me further medical examination and treatment  I understand the Hospital's obligation with respect to the treatment and stabilization of my emergency medical condition  I nevertheless request to be transferred  I release the Hospital, the doctor, and any other persons caring for me from all responsibility or liability for any injury or ill effects that may result from my transfer and agree to accept all responsibility for the consequences of my choice to transfer, rather than receive stabilizing treatment at the Hospital  I understand that because the transfer is my request, my insurance may not provide reimbursement for the services  The Hospital will assist and direct me and my family in how to make arrangements for transfer, but the hospital is not liable for any fees charged by the transport service  In spite of this understanding, I refuse to consent to further medical examination and treatment which has been offered to me, and request transfer to  [address], [address]   I authorize the performance of emergency medical procedures and treatments upon me in both transit and upon arrival at the receiving facility  Additionally, I authorize the release of any and all medical records to the receiving facility and request they be transported with me, if possible  I authorize the performance of emergency medical procedures and treatments upon me in both transit and upon arrival at the receiving facility  Additionally, I authorize the release of any and all medical records to the receiving facility and request they be transported with me, if possible  I understand that the safest mode of transportation during a medical emergency is an ambulance and that the Hospital advocates the use of this mode of transport  Risks of traveling to the receiving facility by car, including absence of medical control, life sustaining equipment, such as oxygen, and medical personnel has been explained to me and I fully understand them  (JUSTIN CORRECT BOX BELOW)  [  ]  I consent to the stated transfer and to be transported by ambulance/helicopter  [  ]  I consent to the stated transfer, but refuse transportation by ambulance and accept full responsibility for my transportation by car  I understand the risks of non-ambulance transfers and I exonerate the Hospital and its staff from any deterioration in my condition that results from this refusal     X___________________________________________    DATE  20  TIME________  Signature of patient or legally responsible individual signing on patient behalf           RELATIONSHIP TO PATIENT_________________________          Provider Certification    NAME Linda Burkett                                         1975                              MRN [id number]    A medical screening exam was performed on the above named patient  Based on the examination:    Condition Necessitating Transfer The encounter diagnosis was Hypoxia      Patient Condition: The patient has been stabilized such that within reasonable medical probability, no material deterioration of the patient condition or the condition of the unborn child(joseph) is likely to result from the transfer    Reason for Transfer: Level of Care needed not available at this facility    Transfer Requirements: [address] [facility]   · Space available and qualified personnel available for treatment as acknowledged by Patient access  · Agreed to accept transfer and to provide appropriate medical treatment as acknowledged by       Dr Jam Perez  · Appropriate medical records of the examination and treatment of the patient are provided at the time of transfer   [address] _______  · Transfer will be performed by qualified personnel from [facility]  and appropriate transfer equipment as required, including the use of necessary and appropriate life support measures  Provider Certification: I have examined the patient and explained the following risks and benefits of being transferred/refusing transfer to the patient/family:  General risk, such as traffic hazards, adverse weather conditions, rough terrain or turbulence, possible failure of equipment (including vehicle or aircraft), or consequences of actions of persons outside the control of the transport personnel      Based on these reasonable risks and benefits to the patient and/or the unborn child(joseph), and based upon the information available at the time of the patients examination, I certify that the medical benefits reasonably to be expected from the provision of appropriate medical treatments at another medical facility outweigh the increasing risks, if any, to the individuals medical condition, and in the case of labor to the unborn child, from effecting the transfer      X____________________________________________ DATE 02/22/20        TIME_______      ORIGINAL - SEND TO MEDICAL RECORDS   COPY - SEND WITH PATIENT DURING TRANSFER

## 2020-02-22 NOTE — ED PROVIDER NOTES
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, s/p re-transplant of left lung 3/1/17,  Apparently her son has been sick at home with strep, presents emergency department with cough and congestion  She reports that her right lung normally does not function very well and now prefers to lay on her side with the right lung down  She reports that she was hypoxic at home with pulse oximetry of 85%  Past medical history of cystic fibrosis, lung transplant with rejection, repeat lung transplant  Family history noncontributory  Social history, son has strep, nonsmoker    Prior to Admission Medications   Prescriptions Last Dose Informant Patient Reported? Taking?    ALPRAZolam (XANAX) 1 mg tablet  Self Yes No   Sig: Take by mouth 2 (two) times a day as needed for anxiety   albuterol (ACCUNEB) 1 25 MG/3ML nebulizer solution  Self Yes No   Sig: Take 1 ampule by nebulization every 4 (four) hours as needed for wheezing   alendronate (FOSAMAX) 70 mg tablet  Self Yes No   Sig: Take 70 mg by mouth every 7 days   atovaquone (MEPRON) 750 mg/5 mL suspension  Self Yes No   Sig: Take 750 mg by mouth daily   colistimethate (COLY-MYCIN M) inhalation solution  Self Yes No   Sig: Take 150 mg by nebulization every 12 (twelve) hours   dexlansoprazole (DEXILANT) 60 MG capsule  Self Yes No   Sig: Take 60 mg by mouth daily   dronabinol (MARINOL) 5 MG capsule  Self Yes No   Sig: Take 5 mg by mouth 3 (three) times a day before meals   ferrous sulfate 325 (65 Fe) mg tablet  Self Yes No   Sig: Take 325 mg by mouth 2 (two) times a day with meals   gabapentin (NEURONTIN) 100 mg capsule  Self Yes No   Sig: Take 100 mg by mouth 2 (two) times a day as needed   magnesium oxide (MAG-OX) 400 mg  Self Yes No   Sig: Take 400 mg by mouth 2 (two) times a day   multivitamin (THERAGRAN) TABS  Self Yes No   Sig: Take 1 tablet by mouth daily   oxyCODONE (ROXICODONE) 5 mg immediate release tablet  Self No No   Sig: Take 1 tablet (5 mg total) by mouth every 8 (eight) hours as needed for moderate painMax Daily Amount: 15 mg   oxybutynin (DITROPAN-XL) 10 MG 24 hr tablet  Self No No   Sig: Take 1 tablet (10 mg total) by mouth daily for 5 doses   pancrelipase, Lip-Prot-Amyl, (CREON) 24,000 units  Self Yes No   Sig: Take 48,000 units of lipase by mouth 3 (three) times a day with meals    predniSONE 10 mg tablet  Self Yes No   Sig: Take by mouth daily   tacrolimus (PROGRAF) 1 mg capsule  Self Yes No   Sig: Take 2 mg by mouth every 12 (twelve) hours   valGANciclovir (VALCYTE) 450 mg tablet  Self Yes No   Sig: Take 900 mg by mouth daily with breakfast      Facility-Administered Medications: None       Past Medical History:   Diagnosis Date    Asthma     Chronic pain     Cystic fibrosis (HCC)     Kidney stone     PONV (postoperative nausea and vomiting)     Transplant recipient        Past Surgical History:   Procedure Laterality Date    APPENDECTOMY      CHOLECYSTECTOMY      FL RETROGRADE PYELOGRAM  1/9/2020    FL RETROGRADE PYELOGRAM  2/4/2020    LUNG SURGERY      MD CYSTO/URETERO W/LITHOTRIPSY &INDWELL STENT INSRT Right 2/4/2020    Procedure: CYSTOSCOPY URETEROSCOPY WITH LITHOTRIPSY HOLMIUM LASER, RETROGRADE PYELOGRAM AND INSERTION STENT URETERAL;  Surgeon: Haydee Adams MD;  [facility]: [facility];  Service: Urology    MD CYSTOURETHROSCOPY,URETER CATHETER Right 1/9/2020    Procedure: CYSTOSCOPY RETROGRADE PYELOGRAM WITH INSERTION STENT URETERAL;  Surgeon: Haydee Adams MD;  [facility];  Service: Urology       Family History   Family history unknown: Yes     I have reviewed and agree with the history as documented  Social History     Tobacco Use    Smoking status: Never Smoker    Smokeless tobacco: Never Used   Substance Use Topics    Alcohol use: Yes     Frequency: 2-4 times a month     Drinks per session: 1 or 2     Binge frequency: Never     Comment: social    Drug use: Yes     Frequency: 2 0 times per week     Types: Marijuana     Comment: medical edible marijuana prescribed       Review of Systems   Constitutional: Negative for diaphoresis, fatigue and fever  HENT: Positive for congestion  Negative for ear pain, nosebleeds and sore throat  Eyes: Negative for photophobia, pain, discharge and visual disturbance  Respiratory: Positive for cough, shortness of breath and wheezing  Negative for choking and chest tightness  Cardiovascular: Negative for chest pain and palpitations  Gastrointestinal: Negative for abdominal distention, abdominal pain, diarrhea and vomiting  Genitourinary: Negative for dysuria, flank pain and frequency  Musculoskeletal: Negative for back pain, gait problem and joint swelling  Skin: Negative for color change and rash  Neurological: Negative for dizziness, syncope and headaches  Psychiatric/Behavioral: Negative for behavioral problems and confusion  The patient is not nervous/anxious  All other systems reviewed and are negative  Physical Exam  Physical Exam   Constitutional: She is oriented to person, place, and time  She appears well-developed and well-nourished  HENT:   Head: Normocephalic  Right Ear: External ear normal    Left Ear: External ear normal    Nose: Nose normal    Mouth/Throat: Oropharynx is clear and moist    Eyes: Pupils are equal, round, and reactive to light  EOM and lids are normal    Neck: Normal range of motion  Neck supple  Cardiovascular: Normal rate, regular rhythm, normal heart sounds and intact distal pulses  Pulmonary/Chest: She is in respiratory distress  She has wheezes     Mild respiratory distress on arrival, improved with oxygen, bilateral wheezing, patient reports taking a treatment prior to arrival   Musculoskeletal: Normal range of motion  She exhibits no deformity  Neurological: She is alert and oriented to person, place, and time  Skin: Skin is warm and dry  Psychiatric: She has a normal mood and affect  Nursing note and vitals reviewed  Pulse oximetry normal at 95% on oxygen, adequate oxygenation, there is no hypoxia  Patient was hypoxic at home pulse oximetry 85%    Vital Signs  ED Triage Vitals   Temperature Pulse Respirations Blood Pressure SpO2   02/22/20 1006 02/22/20 1006 02/22/20 1006 02/22/20 1006 02/22/20 1006   98 4 °F (36 9 °C) 85 18 119/69 95 %      Temp Source Heart Rate Source Patient Position - Orthostatic VS BP Location FiO2 (%)   02/22/20 1006 02/22/20 1006 -- -- --   Oral Monitor         Pain Score       02/22/20 1101       8           Vitals:    02/22/20 1400 02/22/20 1500 02/22/20 1530 02/22/20 1730   BP: 106/64 110/70 114/72 105/58   Pulse: 81 82 76 76         Visual Acuity  Visual Acuity      Most Recent Value   L Pupil Size (mm)  3   R Pupil Size (mm)  3          ED Medications  Medications   meropenem (MERREM) 2,000 mg in sodium chloride 0 9 % 100 mL IVPB (0 mg Intravenous Stopped 2/22/20 1400)   linezolid (ZYVOX) IVPB (premix) 600 mg (0 mg Intravenous Stopped 2/22/20 1201)   HYDROmorphone (DILAUDID) injection 0 5 mg (0 5 mg Intravenous Given 2/22/20 1101)   ondansetron (ZOFRAN) injection 4 mg (4 mg Intravenous Given 2/22/20 1101)   HYDROmorphone (DILAUDID) injection 0 5 mg (0 5 mg Intravenous Given 2/22/20 1322)   HYDROmorphone (DILAUDID) injection 0 5 mg (0 5 mg Intravenous Given 2/22/20 1610)   HYDROmorphone (DILAUDID) injection 1 mg (1 mg Intravenous Given 2/22/20 1759)       Diagnostic Studies  Results Reviewed     Procedure Component Value Units Date/Time    Influenza A/B and RSV PCR [[id number]]  (Abnormal) Collected:  02/22/20 1029    Lab Status:  Final result Specimen:  Nasopharyngeal Swab Updated:  02/22/20 1130     INFLUENZA A PCR None Detected     INFLUENZA B PCR Detected     RSV PCR None Detected    Comprehensive metabolic panel [[id number]]  (Abnormal) Collected:  02/22/20 1046    Lab Status:  Final result Specimen:  Blood from Arm, Right Updated:  02/22/20 1126     Sodium 139 mmol/L      Potassium 4 1 mmol/L      Chloride 104 mmol/L      CO2 24 mmol/L      ANION GAP 11 mmol/L      BUN 23 mg/dL      Creatinine 1 11 mg/dL      Glucose 115 mg/dL      Calcium 8 5 mg/dL      AST 31 U/L      ALT 54 U/L      Alkaline Phosphatase 93 U/L      Total Protein 8 3 g/dL      Albumin 3 7 g/dL      Total Bilirubin 0 40 mg/dL      eGFR 61 ml/min/1 73sq m     Narrative:       [place] guidelines for Chronic Kidney Disease (CKD):     Stage 1 with normal or high GFR (GFR > 90 mL/min/1 73 square meters)    Stage 2 Mild CKD (GFR = 60-89 mL/min/1 73 square meters)    Stage 3A Moderate CKD (GFR = 45-59 mL/min/1 73 square meters)    Stage 3B Moderate CKD (GFR = 30-44 mL/min/1 73 square meters)    Stage 4 Severe CKD (GFR = 15-29 mL/min/1 73 square meters)    Stage 5 End Stage CKD (GFR <15 mL/min/1 73 square meters)  Note: GFR calculation is accurate only with a steady state creatinine    Troponin I [[id number]]  (Normal) Collected:  02/22/20 1046    Lab Status:  Final result Specimen:  Blood from Arm, Right Updated:  02/22/20 1121     Troponin I <0 02 ng/mL     Lactic acid x2 [[id number]]  (Normal) Collected:  02/22/20 1046    Lab Status:  Final result Specimen:  Blood from Arm, Right Updated:  02/22/20 1120     LACTIC ACID 1 5 mmol/L     Narrative:       Result may be elevated if tourniquet was used during collection      Protime-INR [[id number]]  (Normal) Collected:  02/22/20 1046    Lab Status:  Final result Specimen:  Blood from Arm, Right Updated:  02/22/20 1109     Protime 12 6 seconds      INR 0 95    APTT [[id number]]  (Normal) Collected:  02/22/20 1046    Lab Status:  Final result Specimen:  Blood from Arm, Right Updated:  02/22/20 1109     PTT 27 seconds     CBC and differential [[id number]]  (Abnormal) Collected:  02/22/20 1046    Lab Status:  Final result Specimen:  Blood from Arm, Right Updated:  02/22/20 1107     WBC 6 71 Thousand/uL      RBC 4 36 Million/uL      Hemoglobin 13 6 g/dL      Hematocrit 43 7 %       fL      MCH 31 2 pg      MCHC 31 1 g/dL      RDW 13 5 %      MPV 9 7 fL      Platelets 092 Thousands/uL      nRBC 0 /100 WBCs      Neutrophils Relative 74 %      Immat GRANS % 1 %      Lymphocytes Relative 18 %      Monocytes Relative 6 %      Eosinophils Relative 1 %      Basophils Relative 0 %      Neutrophils Absolute 4 95 Thousands/µL      Immature Grans Absolute 0 05 Thousand/uL      Lymphocytes Absolute 1 18 Thousands/µL      Monocytes Absolute 0 43 Thousand/µL      Eosinophils Absolute 0 07 Thousand/µL      Basophils Absolute 0 03 Thousands/µL     Strep A PCR [[id number]]  (Normal) Collected:  02/22/20 1029    Lab Status:  Final result Specimen:  Throat Updated:  02/22/20 1106     STREP A PCR None Detected    Procalcitonin [[id number]] Collected:  02/22/20 1046    Lab Status: In process Specimen:  Blood from Arm, Right Updated:  02/22/20 1055    Blood culture #2 [[id number]] Collected:  02/22/20 1046    Lab Status: In process Specimen:  Blood from Arm, Left Updated:  02/22/20 1055    Blood culture #1 [[id number]] Collected:  02/22/20 1046    Lab Status: In process Specimen:  Blood from Arm, Right Updated:  02/22/20 1055                 XR chest pa & lateral   Final Result by Lobo Hi DO (02/22 1128)      Stable postoperative changes with no acute cardiopulmonary disease              Workstation performed: [id number]                    Procedures  ECG 12 Lead Documentation Only  Date/Time: 2/22/2020 11:05 AM  Performed by: Janice Evans MD  Authorized by: Janice Evans MD     Indications / Diagnosis:  Cough congestion hypoxia  ECG reviewed by me, the ED Provider: yes    Patient location:  ED  Previous ECG:     Previous ECG:  Unavailable  Interpretation:     Interpretation: non-specific    Rate:     ECG rate:  Eighty-six    ECG rate assessment: normal    Rhythm:     Rhythm: sinus rhythm    Comments:      Normal sinus rhythm no acute ST-T wave changes             ED Course      diagnostic testing showed influenza B positive  Patient's electrolytes were within normal limits, normal renal function  lactate was normal no sign of severe sepsis, cardiac troponin troponin was normal no sign of cardiac ischemia  white count was normal at 6 7 no sign of inflammation, hemoglobin was normal at 13 no sign of anemia  Strep a was negative  chest x-ray showed some decreased size of the right lung, there were increased markings at the right lung base  I cannot rule out pneumonia, interpreted by me I was initial   I spoke with the transplant service at [facility], discussed with Dr Bibi Blackmon, will treat with antibiotics to cover pneumonia  Patient apparently had some problems with this right lung it appears to be collapsing with some atelectasis  Apparently the last time this happened the patient required ECMO and repeat lung transplant  [facility] medical decision making 51-year-old female, history of a lung transplant presents emergency department with cough congestion, hypoxia, improved with supplemental oxygen here  Patient has required multiple intubations and extracorporeal membrane oxygenation in the past   Discussed with this Center at [facility] will treat with antibiotics she will be transferred to her transplant center  Patient was treated in the emergency department several hours well a bed was made available  Eventually the patient was transported by helicopter        Disposition  Final diagnoses:   Hypoxia     Time reflects when diagnosis was documented in both MDM as applicable and the Disposition within this note     Time User Action Codes Description Comment    2/22/2020 10:56 AM Pippa Campbell Add [R09 02] Hypoxia       ED Disposition     ED Disposition Condition Date/Time Comment    Transfer to Another [address] [place] [address] Feb 22, 2020 10:56 AM Veesaúl [place] Maradiaga should be transferred out to [address] Gene Main MD Documentation      Most Recent Value   Patient Condition  The patient has been stabilized such that within reasonable medical probability, no material deterioration of the patient condition or the condition of the unborn child(joseph) is likely to result from the transfer   Reason for Transfer  Level of Care needed not available at this facility   Benefits of Transfer  Specialized equipment and/or services available at the receiving facility (Include comment)________________________   Risks of Transfer  Potential for delay in receiving treatment   Accepting Physician  Dr Meghana Chavez Name, [facility]    (Name & Tel number)  Patient access   Transported by (Company and Unit #)  [facility]   Sending MD  Dr Kenyon Woodruff   Provider Certification  General risk, such as traffic hazards, adverse weather conditions, rough terrain or turbulence, possible failure of equipment (including vehicle or aircraft), or consequences of actions of persons outside the control of the transport personnel      RN Documentation      Most [facility] Name, [address] [facility] Assignment  [id number]   [id number] (Name & Tel number)  Patient access   Report Given to  [facility] by [employer] and Unit #)  [facility]      Follow-up Information    None         Discharge Medication List as of 2/22/2020  6:28 PM      CONTINUE these medications which have NOT CHANGED    Details   albuterol (ACCUNEB) 1 25 MG/3ML nebulizer solution Take 1 ampule by nebulization every 4 (four) hours as needed for wheezing, Historical Med      alendronate (FOSAMAX) 70 mg tablet Take 70 mg by mouth every 7 days, Historical Med      ALPRAZolam (XANAX) 1 mg tablet Take by mouth 2 (two) times a day as needed for anxiety, Historical Med      atovaquone (MEPRON) 750 mg/5 mL suspension Take 750 mg by mouth daily, Historical Med      colistimethate (COLY-MYCIN M) inhalation solution Take 150 mg by nebulization every 12 (twelve) hours, Historical Med      dexlansoprazole (DEXILANT) 60 MG capsule Take 60 mg by mouth daily, Historical Med      dronabinol (MARINOL) 5 MG capsule Take 5 mg by mouth 3 (three) times a day before meals, Historical Med      ferrous sulfate 325 (65 Fe) mg tablet Take 325 mg by mouth 2 (two) times a day with meals, Historical Med      gabapentin (NEURONTIN) 100 mg capsule Take 100 mg by mouth 2 (two) times a day as needed, Historical Med      magnesium oxide (MAG-OX) 400 mg Take 400 mg by mouth 2 (two) times a day, Historical Med      multivitamin (THERAGRAN) TABS Take 1 tablet by mouth daily, Historical Med      oxybutynin (DITROPAN-XL) 10 MG 24 hr tablet Take 1 tablet (10 mg total) by mouth daily for 5 doses, Starting Wed 2/5/2020, Until Mon 2/10/2020, Normal      oxyCODONE (ROXICODONE) 5 mg immediate release tablet Take 1 tablet (5 mg total) by mouth every 8 (eight) hours as needed for moderate painMax Daily Amount: 15 mg, Starting Sat 1/11/2020, Normal      pancrelipase, Lip-Prot-Amyl, (CREON) 24,000 units Take 48,000 units of lipase by mouth 3 (three) times a day with meals , Historical Med      predniSONE 10 mg tablet Take by mouth daily, Historical Med      tacrolimus (PROGRAF) 1 mg capsule Take 2 mg by mouth every 12 (twelve) hours, Historical Med      valGANciclovir (VALCYTE) 450 mg tablet Take 900 mg by mouth daily with breakfast, Historical Med           No discharge procedures on file      PDMP Review     None          ED Provider  Electronically Signed by           Adrian Gu MD  02/23/20 0005

## 2020-02-23 LAB
ATRIAL RATE: 86 BPM
P AXIS: 71 DEGREES
PR INTERVAL: 140 MS
PROCALCITONIN SERPL-MCNC: 0.05 NG/ML
QRS AXIS: 77 DEGREES
QRSD INTERVAL: 68 MS
QT INTERVAL: 362 MS
QTC INTERVAL: 433 MS
T WAVE AXIS: 65 DEGREES
VENTRICULAR RATE: 86 BPM

## 2020-02-23 PROCEDURE — 93010 ELECTROCARDIOGRAM REPORT: CPT | Performed by: INTERNAL MEDICINE

## 2020-02-28 LAB
BACTERIA BLD CULT: NORMAL
BACTERIA BLD CULT: NORMAL

## 2020-03-03 ENCOUNTER — LAB REQUISITION (OUTPATIENT)
Dept: LAB | Facility: HOSPITAL | Age: 45
End: 2020-03-03
Payer: COMMERCIAL

## 2020-03-03 DIAGNOSIS — T86.818 OTHER COMPLICATIONS OF LUNG TRANSPLANT (HCC): ICD-10-CM

## 2020-03-03 DIAGNOSIS — J84.116 CRYPTOGENIC ORGANIZING PNEUMONIA (HCC): ICD-10-CM

## 2020-03-03 LAB
ALBUMIN SERPL BCP-MCNC: 3.9 G/DL (ref 3.5–5)
ALP SERPL-CCNC: 76 U/L (ref 46–116)
ALT SERPL W P-5'-P-CCNC: 54 U/L (ref 12–78)
ANION GAP SERPL CALCULATED.3IONS-SCNC: 10 MMOL/L (ref 4–13)
AST SERPL W P-5'-P-CCNC: 18 U/L (ref 5–45)
BASOPHILS # BLD AUTO: 0.03 THOUSANDS/ΜL (ref 0–0.1)
BASOPHILS NFR BLD AUTO: 0 % (ref 0–1)
BILIRUB SERPL-MCNC: 0.4 MG/DL (ref 0.2–1)
BUN SERPL-MCNC: 25 MG/DL (ref 5–25)
CALCIUM SERPL-MCNC: 9.5 MG/DL (ref 8.3–10.1)
CHLORIDE SERPL-SCNC: 102 MMOL/L (ref 100–108)
CO2 SERPL-SCNC: 25 MMOL/L (ref 21–32)
CREAT SERPL-MCNC: 1 MG/DL (ref 0.6–1.3)
EOSINOPHIL # BLD AUTO: 0.06 THOUSAND/ΜL (ref 0–0.61)
EOSINOPHIL NFR BLD AUTO: 1 % (ref 0–6)
ERYTHROCYTE [DISTWIDTH] IN BLOOD BY AUTOMATED COUNT: 12.7 % (ref 11.6–15.1)
GFR SERPL CREATININE-BSD FRML MDRD: 70 ML/MIN/1.73SQ M
GLUCOSE SERPL-MCNC: 132 MG/DL (ref 65–140)
HCT VFR BLD AUTO: 39.9 % (ref 34.8–46.1)
HGB BLD-MCNC: 12.9 G/DL (ref 11.5–15.4)
IMM GRANULOCYTES # BLD AUTO: 0.09 THOUSAND/UL (ref 0–0.2)
IMM GRANULOCYTES NFR BLD AUTO: 1 % (ref 0–2)
LYMPHOCYTES # BLD AUTO: 2.49 THOUSANDS/ΜL (ref 0.6–4.47)
LYMPHOCYTES NFR BLD AUTO: 25 % (ref 14–44)
MAGNESIUM SERPL-MCNC: 2 MG/DL (ref 1.6–2.6)
MCH RBC QN AUTO: 31 PG (ref 26.8–34.3)
MCHC RBC AUTO-ENTMCNC: 32.3 G/DL (ref 31.4–37.4)
MCV RBC AUTO: 96 FL (ref 82–98)
MONOCYTES # BLD AUTO: 1.2 THOUSAND/ΜL (ref 0.17–1.22)
MONOCYTES NFR BLD AUTO: 12 % (ref 4–12)
NEUTROPHILS # BLD AUTO: 6.25 THOUSANDS/ΜL (ref 1.85–7.62)
NEUTS SEG NFR BLD AUTO: 61 % (ref 43–75)
NRBC BLD AUTO-RTO: 0 /100 WBCS
PLATELET # BLD AUTO: 155 THOUSANDS/UL (ref 149–390)
PMV BLD AUTO: 9.2 FL (ref 8.9–12.7)
POTASSIUM SERPL-SCNC: 4 MMOL/L (ref 3.5–5.3)
PROT SERPL-MCNC: 7.8 G/DL (ref 6.4–8.2)
RBC # BLD AUTO: 4.16 MILLION/UL (ref 3.81–5.12)
SODIUM SERPL-SCNC: 137 MMOL/L (ref 136–145)
WBC # BLD AUTO: 10.12 THOUSAND/UL (ref 4.31–10.16)

## 2020-03-03 PROCEDURE — 83735 ASSAY OF MAGNESIUM: CPT | Performed by: INTERNAL MEDICINE

## 2020-03-03 PROCEDURE — 80053 COMPREHEN METABOLIC PANEL: CPT | Performed by: INTERNAL MEDICINE

## 2020-03-03 PROCEDURE — 85025 COMPLETE CBC W/AUTO DIFF WBC: CPT | Performed by: INTERNAL MEDICINE

## 2020-03-06 ENCOUNTER — APPOINTMENT (EMERGENCY)
Dept: RADIOLOGY | Facility: HOSPITAL | Age: 45
DRG: 208 | End: 2020-03-06
Payer: COMMERCIAL

## 2020-03-06 ENCOUNTER — APPOINTMENT (INPATIENT)
Dept: RADIOLOGY | Facility: HOSPITAL | Age: 45
DRG: 208 | End: 2020-03-06
Payer: COMMERCIAL

## 2020-03-06 ENCOUNTER — APPOINTMENT (EMERGENCY)
Dept: CT IMAGING | Facility: HOSPITAL | Age: 45
DRG: 208 | End: 2020-03-06
Payer: COMMERCIAL

## 2020-03-06 ENCOUNTER — HOSPITAL ENCOUNTER (INPATIENT)
Facility: HOSPITAL | Age: 45
LOS: 1 days | Discharge: ANOTHER HOSPITAL | DRG: 208 | End: 2020-03-07
Attending: EMERGENCY MEDICINE | Admitting: ANESTHESIOLOGY
Payer: COMMERCIAL

## 2020-03-06 DIAGNOSIS — R09.02 HYPOXEMIA: ICD-10-CM

## 2020-03-06 DIAGNOSIS — R06.02 SOB (SHORTNESS OF BREATH): ICD-10-CM

## 2020-03-06 DIAGNOSIS — R65.20 SEVERE SEPSIS (HCC): ICD-10-CM

## 2020-03-06 DIAGNOSIS — J18.9 MULTIFOCAL PNEUMONIA: Primary | ICD-10-CM

## 2020-03-06 DIAGNOSIS — A41.9 SEVERE SEPSIS (HCC): ICD-10-CM

## 2020-03-06 PROBLEM — N20.1 OBSTRUCTION OF RIGHT URETEROPELVIC JUNCTION (UPJ) DUE TO STONE: Status: RESOLVED | Noted: 2020-01-08 | Resolved: 2020-03-06

## 2020-03-06 PROBLEM — N20.1 RIGHT URETERAL STONE: Status: RESOLVED | Noted: 2020-01-27 | Resolved: 2020-03-06

## 2020-03-06 PROBLEM — J96.11 HYPOXEMIC RESPIRATORY FAILURE, CHRONIC (HCC): Status: ACTIVE | Noted: 2020-03-06

## 2020-03-06 PROBLEM — J45.909 ASTHMA: Status: ACTIVE | Noted: 2020-03-06

## 2020-03-06 PROBLEM — N20.1 URETEROLITHIASIS: Status: RESOLVED | Noted: 2020-01-09 | Resolved: 2020-03-06

## 2020-03-06 LAB
ALBUMIN SERPL BCP-MCNC: 3.8 G/DL (ref 3.5–5)
ALP SERPL-CCNC: 93 U/L (ref 46–116)
ALT SERPL W P-5'-P-CCNC: 93 U/L (ref 12–78)
AMPHETAMINES SERPL QL SCN: NEGATIVE
ANION GAP SERPL CALCULATED.3IONS-SCNC: 13 MMOL/L (ref 4–13)
APTT PPP: 25 SECONDS (ref 23–37)
ARTERIAL PATENCY WRIST A: YES
AST SERPL W P-5'-P-CCNC: 96 U/L (ref 5–45)
BARBITURATES UR QL: NEGATIVE
BASE EXCESS BLDA CALC-SCNC: -6.4 MMOL/L
BASOPHILS # BLD AUTO: 0.02 THOUSANDS/ΜL (ref 0–0.1)
BASOPHILS NFR BLD AUTO: 0 % (ref 0–1)
BENZODIAZ UR QL: POSITIVE
BILIRUB SERPL-MCNC: 0.4 MG/DL (ref 0.2–1)
BUN SERPL-MCNC: 25 MG/DL (ref 5–25)
CALCIUM SERPL-MCNC: 9.4 MG/DL (ref 8.3–10.1)
CHLORIDE SERPL-SCNC: 103 MMOL/L (ref 100–108)
CO2 SERPL-SCNC: 22 MMOL/L (ref 21–32)
COCAINE UR QL: NEGATIVE
CREAT SERPL-MCNC: 1.14 MG/DL (ref 0.6–1.3)
EOSINOPHIL # BLD AUTO: 0 THOUSAND/ΜL (ref 0–0.61)
EOSINOPHIL NFR BLD AUTO: 0 % (ref 0–6)
ERYTHROCYTE [DISTWIDTH] IN BLOOD BY AUTOMATED COUNT: 13 % (ref 11.6–15.1)
FLUAV RNA NPH QL NAA+PROBE: NORMAL
FLUBV RNA NPH QL NAA+PROBE: NORMAL
GFR SERPL CREATININE-BSD FRML MDRD: 59 ML/MIN/1.73SQ M
GLUCOSE SERPL-MCNC: 123 MG/DL (ref 65–140)
GLUCOSE SERPL-MCNC: 209 MG/DL (ref 65–140)
HCO3 BLDA-SCNC: 15.9 MMOL/L (ref 22–28)
HCT VFR BLD AUTO: 41.5 % (ref 34.8–46.1)
HGB BLD-MCNC: 13.1 G/DL (ref 11.5–15.4)
HOROWITZ INDEX BLDA+IHG-RTO: 80 MM[HG]
I-TIME: 0.8
IMM GRANULOCYTES # BLD AUTO: 0.15 THOUSAND/UL (ref 0–0.2)
IMM GRANULOCYTES NFR BLD AUTO: 1 % (ref 0–2)
INR PPP: 0.92 (ref 0.84–1.19)
LACTATE SERPL-SCNC: 1.8 MMOL/L (ref 0.5–2)
LACTATE SERPL-SCNC: 2.5 MMOL/L (ref 0.5–2)
LACTATE SERPL-SCNC: 2.5 MMOL/L (ref 0.5–2)
LYMPHOCYTES # BLD AUTO: 0.95 THOUSANDS/ΜL (ref 0.6–4.47)
LYMPHOCYTES NFR BLD AUTO: 8 % (ref 14–44)
MCH RBC QN AUTO: 30.8 PG (ref 26.8–34.3)
MCHC RBC AUTO-ENTMCNC: 31.6 G/DL (ref 31.4–37.4)
MCV RBC AUTO: 97 FL (ref 82–98)
METHADONE UR QL: NEGATIVE
MONOCYTES # BLD AUTO: 0.44 THOUSAND/ΜL (ref 0.17–1.22)
MONOCYTES NFR BLD AUTO: 4 % (ref 4–12)
NEUTROPHILS # BLD AUTO: 10.14 THOUSANDS/ΜL (ref 1.85–7.62)
NEUTS SEG NFR BLD AUTO: 87 % (ref 43–75)
NRBC BLD AUTO-RTO: 0 /100 WBCS
O2 CT BLDA-SCNC: 16.8 ML/DL (ref 16–23)
OPIATES UR QL SCN: NEGATIVE
OXYHGB MFR BLDA: 98.4 % (ref 94–97)
PCO2 BLDA: 23.3 MM HG (ref 36–44)
PCP UR QL: NEGATIVE
PEEP RESPIRATORY: 10 CM[H2O]
PH BLDA: 7.45 [PH] (ref 7.35–7.45)
PLATELET # BLD AUTO: 180 THOUSANDS/UL (ref 149–390)
PMV BLD AUTO: 9.3 FL (ref 8.9–12.7)
PO2 BLDA: 269.1 MM HG (ref 75–129)
POTASSIUM SERPL-SCNC: 4.3 MMOL/L (ref 3.5–5.3)
PROCALCITONIN SERPL-MCNC: 0.05 NG/ML
PROT SERPL-MCNC: 8.2 G/DL (ref 6.4–8.2)
PROTHROMBIN TIME: 12.4 SECONDS (ref 11.6–14.5)
RBC # BLD AUTO: 4.26 MILLION/UL (ref 3.81–5.12)
RSV RNA NPH QL NAA+PROBE: NORMAL
SODIUM SERPL-SCNC: 138 MMOL/L (ref 136–145)
SPECIMEN SOURCE: ABNORMAL
THC UR QL: POSITIVE
VENT AC: 24
VENT- AC: AC
VT SETTING VENT: 360 ML
WBC # BLD AUTO: 11.7 THOUSAND/UL (ref 4.31–10.16)

## 2020-03-06 PROCEDURE — 31500 INSERT EMERGENCY AIRWAY: CPT | Performed by: NURSE PRACTITIONER

## 2020-03-06 PROCEDURE — 99291 CRITICAL CARE FIRST HOUR: CPT | Performed by: EMERGENCY MEDICINE

## 2020-03-06 PROCEDURE — 80053 COMPREHEN METABOLIC PANEL: CPT | Performed by: EMERGENCY MEDICINE

## 2020-03-06 PROCEDURE — 80307 DRUG TEST PRSMV CHEM ANLYZR: CPT | Performed by: NURSE PRACTITIONER

## 2020-03-06 PROCEDURE — 5A1935Z RESPIRATORY VENTILATION, LESS THAN 24 CONSECUTIVE HOURS: ICD-10-PCS | Performed by: ANESTHESIOLOGY

## 2020-03-06 PROCEDURE — 71045 X-RAY EXAM CHEST 1 VIEW: CPT

## 2020-03-06 PROCEDURE — 94760 N-INVAS EAR/PLS OXIMETRY 1: CPT

## 2020-03-06 PROCEDURE — 94645 CONT INHLJ TX EACH ADDL HOUR: CPT

## 2020-03-06 PROCEDURE — 96375 TX/PRO/DX INJ NEW DRUG ADDON: CPT

## 2020-03-06 PROCEDURE — 85610 PROTHROMBIN TIME: CPT | Performed by: EMERGENCY MEDICINE

## 2020-03-06 PROCEDURE — 96361 HYDRATE IV INFUSION ADD-ON: CPT

## 2020-03-06 PROCEDURE — 94644 CONT INHLJ TX 1ST HOUR: CPT

## 2020-03-06 PROCEDURE — 85730 THROMBOPLASTIN TIME PARTIAL: CPT | Performed by: EMERGENCY MEDICINE

## 2020-03-06 PROCEDURE — 87631 RESP VIRUS 3-5 TARGETS: CPT | Performed by: EMERGENCY MEDICINE

## 2020-03-06 PROCEDURE — 94640 AIRWAY INHALATION TREATMENT: CPT

## 2020-03-06 PROCEDURE — 82948 REAGENT STRIP/BLOOD GLUCOSE: CPT

## 2020-03-06 PROCEDURE — 96376 TX/PRO/DX INJ SAME DRUG ADON: CPT

## 2020-03-06 PROCEDURE — 82805 BLOOD GASES W/O2 SATURATION: CPT | Performed by: NURSE PRACTITIONER

## 2020-03-06 PROCEDURE — 36600 WITHDRAWAL OF ARTERIAL BLOOD: CPT

## 2020-03-06 PROCEDURE — 71275 CT ANGIOGRAPHY CHEST: CPT

## 2020-03-06 PROCEDURE — 87076 CULTURE ANAEROBE IDENT EACH: CPT | Performed by: EMERGENCY MEDICINE

## 2020-03-06 PROCEDURE — 99233 SBSQ HOSP IP/OBS HIGH 50: CPT | Performed by: NURSE PRACTITIONER

## 2020-03-06 PROCEDURE — 0BH17EZ INSERTION OF ENDOTRACHEAL AIRWAY INTO TRACHEA, VIA NATURAL OR ARTIFICIAL OPENING: ICD-10-PCS | Performed by: ANESTHESIOLOGY

## 2020-03-06 PROCEDURE — 84145 PROCALCITONIN (PCT): CPT | Performed by: EMERGENCY MEDICINE

## 2020-03-06 PROCEDURE — 94002 VENT MGMT INPAT INIT DAY: CPT

## 2020-03-06 PROCEDURE — 87181 SC STD AGAR DILUTION PER AGT: CPT | Performed by: EMERGENCY MEDICINE

## 2020-03-06 PROCEDURE — 99291 CRITICAL CARE FIRST HOUR: CPT | Performed by: NURSE PRACTITIONER

## 2020-03-06 PROCEDURE — 85025 COMPLETE CBC W/AUTO DIFF WBC: CPT | Performed by: EMERGENCY MEDICINE

## 2020-03-06 PROCEDURE — 94660 CPAP INITIATION&MGMT: CPT

## 2020-03-06 PROCEDURE — 36415 COLL VENOUS BLD VENIPUNCTURE: CPT | Performed by: EMERGENCY MEDICINE

## 2020-03-06 PROCEDURE — 87449 NOS EACH ORGANISM AG IA: CPT | Performed by: NURSE PRACTITIONER

## 2020-03-06 PROCEDURE — 96367 TX/PROPH/DG ADDL SEQ IV INF: CPT

## 2020-03-06 PROCEDURE — 99291 CRITICAL CARE FIRST HOUR: CPT

## 2020-03-06 PROCEDURE — 87040 BLOOD CULTURE FOR BACTERIA: CPT | Performed by: EMERGENCY MEDICINE

## 2020-03-06 PROCEDURE — 83605 ASSAY OF LACTIC ACID: CPT | Performed by: EMERGENCY MEDICINE

## 2020-03-06 PROCEDURE — 93005 ELECTROCARDIOGRAM TRACING: CPT

## 2020-03-06 PROCEDURE — NC001 PR NO CHARGE: Performed by: NURSE PRACTITIONER

## 2020-03-06 PROCEDURE — 96365 THER/PROPH/DIAG IV INF INIT: CPT

## 2020-03-06 PROCEDURE — 83605 ASSAY OF LACTIC ACID: CPT | Performed by: NURSE PRACTITIONER

## 2020-03-06 RX ORDER — PROPOFOL 10 MG/ML
5-50 INJECTION, EMULSION INTRAVENOUS
Status: DISCONTINUED | OUTPATIENT
Start: 2020-03-06 | End: 2020-03-07 | Stop reason: HOSPADM

## 2020-03-06 RX ORDER — ALPRAZOLAM 0.5 MG/1
1 TABLET ORAL 2 TIMES DAILY PRN
Status: DISCONTINUED | OUTPATIENT
Start: 2020-03-06 | End: 2020-03-06

## 2020-03-06 RX ORDER — LIDOCAINE 50 MG/G
1 PATCH TOPICAL DAILY
Status: DISCONTINUED | OUTPATIENT
Start: 2020-03-07 | End: 2020-03-07 | Stop reason: HOSPADM

## 2020-03-06 RX ORDER — PREDNISONE 10 MG/1
10 TABLET ORAL DAILY
Status: DISCONTINUED | OUTPATIENT
Start: 2020-03-07 | End: 2020-03-07 | Stop reason: HOSPADM

## 2020-03-06 RX ORDER — TRAMADOL HYDROCHLORIDE 50 MG/1
50 TABLET ORAL EVERY 6 HOURS PRN
Status: DISCONTINUED | OUTPATIENT
Start: 2020-03-06 | End: 2020-03-06

## 2020-03-06 RX ORDER — CHLORHEXIDINE GLUCONATE 0.12 MG/ML
15 RINSE ORAL EVERY 12 HOURS SCHEDULED
Status: DISCONTINUED | OUTPATIENT
Start: 2020-03-06 | End: 2020-03-07 | Stop reason: HOSPADM

## 2020-03-06 RX ORDER — TACROLIMUS 1 MG/1
2 CAPSULE ORAL EVERY 12 HOURS SCHEDULED
Status: DISCONTINUED | OUTPATIENT
Start: 2020-03-06 | End: 2020-03-07 | Stop reason: HOSPADM

## 2020-03-06 RX ORDER — LEVALBUTEROL 1.25 MG/.5ML
1.25 SOLUTION, CONCENTRATE RESPIRATORY (INHALATION) EVERY 8 HOURS PRN
Status: DISCONTINUED | OUTPATIENT
Start: 2020-03-06 | End: 2020-03-07 | Stop reason: HOSPADM

## 2020-03-06 RX ORDER — HYDROMORPHONE HCL/PF 1 MG/ML
0.2 SYRINGE (ML) INJECTION ONCE
Status: COMPLETED | OUTPATIENT
Start: 2020-03-06 | End: 2020-03-06

## 2020-03-06 RX ORDER — FENTANYL CITRATE 50 UG/ML
INJECTION, SOLUTION INTRAMUSCULAR; INTRAVENOUS
Status: COMPLETED
Start: 2020-03-06 | End: 2020-03-06

## 2020-03-06 RX ORDER — SODIUM CHLORIDE 9 MG/ML
75 INJECTION, SOLUTION INTRAVENOUS CONTINUOUS
Status: DISCONTINUED | OUTPATIENT
Start: 2020-03-06 | End: 2020-03-07 | Stop reason: HOSPADM

## 2020-03-06 RX ORDER — MIDAZOLAM HYDROCHLORIDE 2 MG/2ML
INJECTION, SOLUTION INTRAMUSCULAR; INTRAVENOUS
Status: COMPLETED
Start: 2020-03-06 | End: 2020-03-06

## 2020-03-06 RX ORDER — VALGANCICLOVIR 450 MG/1
900 TABLET, FILM COATED ORAL
Status: DISCONTINUED | OUTPATIENT
Start: 2020-03-07 | End: 2020-03-07 | Stop reason: HOSPADM

## 2020-03-06 RX ORDER — HYDROMORPHONE HCL/PF 1 MG/ML
0.5 SYRINGE (ML) INJECTION ONCE
Status: COMPLETED | OUTPATIENT
Start: 2020-03-06 | End: 2020-03-06

## 2020-03-06 RX ORDER — ATOVAQUONE 750 MG/5ML
750 SUSPENSION ORAL
Status: DISCONTINUED | OUTPATIENT
Start: 2020-03-07 | End: 2020-03-07 | Stop reason: HOSPADM

## 2020-03-06 RX ORDER — LINEZOLID 2 MG/ML
600 INJECTION, SOLUTION INTRAVENOUS EVERY 12 HOURS
Status: DISCONTINUED | OUTPATIENT
Start: 2020-03-07 | End: 2020-03-07 | Stop reason: HOSPADM

## 2020-03-06 RX ORDER — PANTOPRAZOLE SODIUM 40 MG/1
40 INJECTION, POWDER, FOR SOLUTION INTRAVENOUS EVERY 12 HOURS SCHEDULED
Status: DISCONTINUED | OUTPATIENT
Start: 2020-03-06 | End: 2020-03-07 | Stop reason: HOSPADM

## 2020-03-06 RX ORDER — IPRATROPIUM BROMIDE AND ALBUTEROL SULFATE 2.5; .5 MG/3ML; MG/3ML
3 SOLUTION RESPIRATORY (INHALATION)
Status: COMPLETED | OUTPATIENT
Start: 2020-03-06 | End: 2020-03-06

## 2020-03-06 RX ORDER — HEPARIN SODIUM 5000 [USP'U]/ML
5000 INJECTION, SOLUTION INTRAVENOUS; SUBCUTANEOUS EVERY 8 HOURS SCHEDULED
Status: DISCONTINUED | OUTPATIENT
Start: 2020-03-06 | End: 2020-03-07 | Stop reason: HOSPADM

## 2020-03-06 RX ORDER — FENTANYL CITRATE 50 UG/ML
50 INJECTION, SOLUTION INTRAMUSCULAR; INTRAVENOUS ONCE
Status: COMPLETED | OUTPATIENT
Start: 2020-03-06 | End: 2020-03-06

## 2020-03-06 RX ORDER — DRONABINOL 2.5 MG/1
5 CAPSULE ORAL
Status: DISCONTINUED | OUTPATIENT
Start: 2020-03-07 | End: 2020-03-07 | Stop reason: HOSPADM

## 2020-03-06 RX ORDER — ACETAMINOPHEN 325 MG/1
975 TABLET ORAL EVERY 6 HOURS SCHEDULED
Status: DISCONTINUED | OUTPATIENT
Start: 2020-03-06 | End: 2020-03-07 | Stop reason: HOSPADM

## 2020-03-06 RX ORDER — FENTANYL CITRATE-0.9 % NACL/PF 10 MCG/ML
25 PLASTIC BAG, INJECTION (ML) INTRAVENOUS CONTINUOUS
Status: DISCONTINUED | OUTPATIENT
Start: 2020-03-06 | End: 2020-03-07 | Stop reason: HOSPADM

## 2020-03-06 RX ORDER — GABAPENTIN 100 MG/1
100 CAPSULE ORAL 2 TIMES DAILY
Status: DISCONTINUED | OUTPATIENT
Start: 2020-03-06 | End: 2020-03-07 | Stop reason: HOSPADM

## 2020-03-06 RX ORDER — LINEZOLID 2 MG/ML
600 INJECTION, SOLUTION INTRAVENOUS ONCE
Status: COMPLETED | OUTPATIENT
Start: 2020-03-06 | End: 2020-03-06

## 2020-03-06 RX ORDER — ALBUTEROL SULFATE 2.5 MG/3ML
1 SOLUTION RESPIRATORY (INHALATION) ONCE
Status: COMPLETED | OUTPATIENT
Start: 2020-03-06 | End: 2020-03-06

## 2020-03-06 RX ORDER — MINERAL OIL AND PETROLATUM 150; 830 MG/G; MG/G
OINTMENT OPHTHALMIC
Status: DISCONTINUED | OUTPATIENT
Start: 2020-03-06 | End: 2020-03-07 | Stop reason: HOSPADM

## 2020-03-06 RX ADMIN — IPRATROPIUM BROMIDE AND ALBUTEROL SULFATE 3 ML: 2.5; .5 SOLUTION RESPIRATORY (INHALATION) at 13:36

## 2020-03-06 RX ADMIN — HYDROMORPHONE HYDROCHLORIDE 0.5 MG: 1 INJECTION, SOLUTION INTRAMUSCULAR; INTRAVENOUS; SUBCUTANEOUS at 15:31

## 2020-03-06 RX ADMIN — MIDAZOLAM HYDROCHLORIDE: 1 INJECTION, SOLUTION INTRAMUSCULAR; INTRAVENOUS at 21:31

## 2020-03-06 RX ADMIN — FENTANYL CITRATE 50 MCG: 50 INJECTION, SOLUTION INTRAMUSCULAR; INTRAVENOUS at 22:52

## 2020-03-06 RX ADMIN — MEROPENEM 1000 MG: 1 INJECTION, POWDER, FOR SOLUTION INTRAVENOUS at 14:41

## 2020-03-06 RX ADMIN — SODIUM CHLORIDE 75 ML/HR: 0.9 INJECTION, SOLUTION INTRAVENOUS at 22:41

## 2020-03-06 RX ADMIN — IOHEXOL 85 ML: 350 INJECTION, SOLUTION INTRAVENOUS at 14:10

## 2020-03-06 RX ADMIN — FENTANYL CITRATE 50 MCG: 50 INJECTION, SOLUTION INTRAMUSCULAR; INTRAVENOUS at 14:47

## 2020-03-06 RX ADMIN — SODIUM CHLORIDE 1000 ML: 0.9 INJECTION, SOLUTION INTRAVENOUS at 15:41

## 2020-03-06 RX ADMIN — FENTANYL CITRATE 50 MCG: 50 INJECTION, SOLUTION INTRAMUSCULAR; INTRAVENOUS at 13:36

## 2020-03-06 RX ADMIN — PROPOFOL 10 MCG/KG/MIN: 10 INJECTION, EMULSION INTRAVENOUS at 22:00

## 2020-03-06 RX ADMIN — HYDROMORPHONE HYDROCHLORIDE 0.2 MG: 1 INJECTION, SOLUTION INTRAMUSCULAR; INTRAVENOUS; SUBCUTANEOUS at 17:56

## 2020-03-06 RX ADMIN — LINEZOLID 600 MG: 600 INJECTION, SOLUTION INTRAVENOUS at 13:40

## 2020-03-06 RX ADMIN — FENTANYL CITRATE 100 MCG: 50 INJECTION INTRAMUSCULAR; INTRAVENOUS at 22:51

## 2020-03-06 RX ADMIN — WATER 360000 NG: 1 SOLUTION INTRAVENOUS at 23:17

## 2020-03-06 RX ADMIN — SODIUM CHLORIDE 1000 ML: 0.9 INJECTION, SOLUTION INTRAVENOUS at 14:18

## 2020-03-06 RX ADMIN — FENTANYL CITRATE 25 MCG/HR: 50 INJECTION, SOLUTION INTRAMUSCULAR; INTRAVENOUS at 22:41

## 2020-03-06 NOTE — ED NOTES
Pt to 213 communicated to unit clerk  Respiratory called for transport       Anu Alaniz RN  03/06/20 4464

## 2020-03-06 NOTE — EMTALA/ACUTE CARE TRANSFER
[address] [facility]  [address] [facility] [address]  Dept: [phone]      EMTALA TRANSFER CONSENT    NAME Fide Fortune                                         1975                              MRN [id number]    I have been informed of my rights regarding examination, treatment, and transfer   by Dr Tere Harper MD    Benefits: Specialized equipment and/or services available at the receiving facility (Include comment)________________________(Lung Transplant Team, continuity of care)    Risks: Potential for delay in receiving treatment, Increased discomfort during transfer, Loss of IV, Potential deterioration of medical condition, Possible worsening of condition or death during transfer      Consent for Transfer:  I acknowledge that my medical condition has been evaluated and explained to me by the emergency department physician or other qualified medical person and/or my attending physician, who has recommended that I be transferred to the service of  Accepting Physician: Dr Blanca Reynaga at [address] Name, [address] : [facility] of [address]  The above potential benefits of such transfer, the potential risks associated with such transfer, and the probable risks of not being transferred have been explained to me, and I fully understand them  The doctor has explained that, in my case, the benefits of transfer outweigh the risks  I agree to be transferred  I authorize the performance of emergency medical procedures and treatments upon me in both transit and upon arrival at the receiving facility  Additionally, I authorize the release of any and all medical records to the receiving facility and request they be transported with me, if possible  I understand that the safest mode of transportation during a medical emergency is an ambulance and that the Hospital advocates the use of this mode of transport   Risks of traveling to the receiving facility by car, including absence of medical control, life sustaining equipment, such as oxygen, and medical personnel has been explained to me and I fully understand them  (JUSTIN CORRECT BOX BELOW)  [  ]  I consent to the stated transfer and to be transported by ambulance/helicopter  [  ]  I consent to the stated transfer, but refuse transportation by ambulance and accept full responsibility for my transportation by car  I understand the risks of non-ambulance transfers and I exonerate the Hospital and its staff from any deterioration in my condition that results from this refusal     X___________________________________________    DATE  20  TIME________  Signature of patient or legally responsible individual signing on patient behalf           RELATIONSHIP TO PATIENT_________________________          Provider Certification    NAME Daisha Long                                        [facility] 1975                              MRN [id number]    A medical screening exam was performed on the above named patient  Based on the examination:    Condition Necessitating Transfer The primary encounter diagnosis was Hypoxemia  Diagnoses of SOB (shortness of breath) and RLL pneumonia ([facility] [address] ) were also pertinent to this visit      Patient Condition: The patient has been stabilized such that within reasonable medical probability, no material deterioration of the patient condition or the condition of the unborn child(joseph) is likely to result from the transfer    Reason for Transfer: Patient/Family request    Transfer Requirements: [address]  [facility]   · Space available and qualified personnel available for treatment as acknowledged by    · Agreed to accept transfer and to provide appropriate medical treatment as acknowledged by       Dr Suze Pandya  · Appropriate medical records of the examination and treatment of the patient are provided at the time of transfer   [address] [facility] COMPLETED _______  · Transfer will be performed by qualified personnel from    and appropriate transfer equipment as required, including the use of necessary and appropriate life support measures  Provider Certification: I have examined the patient and explained the following risks and benefits of being transferred/refusing transfer to the patient/family:  General risk, such as traffic hazards, adverse weather conditions, rough terrain or turbulence, possible failure of equipment (including vehicle or aircraft), or consequences of actions of persons outside the control of the transport personnel, Unanticipated needs of medical equipment and personnel during transport, Risk of worsening condition, The possibility of a transport vehicle being unavailable      Based on these reasonable risks and benefits to the patient and/or the unborn child(joseph), and based upon the information available at the time of the patients examination, I certify that the medical benefits reasonably to be expected from the provision of appropriate medical treatments at another medical facility outweigh the increasing risks, if any, to the individuals medical condition, and in the case of labor to the unborn child, from effecting the transfer      X____________________________________________ DATE 03/06/20        TIME_______      ORIGINAL - SEND TO MEDICAL RECORDS   COPY - SEND WITH PATIENT DURING TRANSFER

## 2020-03-06 NOTE — SEPSIS NOTE
Sepsis Note   Fide Fortune 45 y o  female MRN: [id number]  Unit/Bed#: ED 24 Encounter: [id number]     sepsis antibiotic therapy chosen after consultation with [place] [facility], they recommended and requested linezolid and meropenem based on recent culture results from last week, as well as positive clinical response  In addition, patient has allergies to vancomycin and ceftazidime, complicating antibiotic selection  qSOFA     Row Name 03/06/20 1645 03/06/20 1515 03/06/20 1500 03/06/20 1415 03/06/20 1400    Altered mental status GCS < 15              Respiratory Rate > / =22  1  0  1  1  1    Systolic BP < / =574  0  0  0  0  0    Q Sofa Score  1  0  1  1  1    Row Name 03/06/20 1345 03/06/20 1302             Altered mental status GCS < 15           Respiratory Rate > / =22  1  1       Systolic BP < / =937  0  0       Q Sofa Score  1  1           Initial Sepsis Screening     Row Name 03/06/20 1603                Is the patient's history suggestive of a new or worsening infection? (!) Yes (Proceed)  -AUGIE        Suspected source of infection  pneumonia  -AUGIE        Are two or more of the following signs & symptoms of infection both present and new to the patient? (!) Yes (Proceed)  -AUGIE        Indicate SIRS criteria  Tachycardia > 90 bpm;Tachypnea > 20 resp per min  -AUGIE        If the answer is yes to both questions, suspicion of sepsis is present          If severe sepsis is present AND tissue hypoperfusion perists in the hour after fluid resuscitation or lactate > 4, the patient meets criteria for SEPTIC SHOCK          Are any of the following organ dysfunction criteria present within 6 hours of suspected infection and SIRS criteria that are NOT considered to be chronic conditions?   (!) Yes  -AUGIE        Organ dysfunction  Lactate > 2 0 mmol/L  -AUGIE        Date of presentation of severe sepsis  03/06/20  -AUGIE        Time of presentation of severe sepsis  1603  -AUGIE        Tissue hypoperfusion persists in the hour after crystalloid fluid administration, evidenced, by either:          Was hypotension present within one hour of the conclusion of crystalloid fluid administration?           Date of presentation of septic shock          Time of presentation of septic shock            User Key  (r) = Recorded By, (t) = Taken By, (c) = Cosigned By    [address] Name Provider Héctor Rodriguez MD Physician

## 2020-03-06 NOTE — ED PROVIDER NOTES
Pt Name: Daisha Long  MRN: [id number]  [place] 1975  Age/Sex: 45 y o  female  Date of evaluation: 3/6/2020  PCP: Sammy Hardy MD    [address] [place] [address]    Chief Complaint   Patient presents with    Shortness of Breath     Pt brought in via EMS for eval of SOb that began this am  Hx of double lung transplant  Pt reports pulse ox in 60's this am           HPI    45 y o  female presenting with shortness of breath  Patient states she has a history of cystic fibrosis with bilateral lung transplant as well as a single left-sided lung transplant more recently, currently on Prograf and prednisone, was in normal state of health yesterday and the past few days having recovered from an influenza infection 2 weeks ago  She states she woke this morning with significant short of breath, was satting 60% on room air based on home pulse ox  She now complains of shortness of breath as well as left lower chest pain, sharp, stabbing, severe in intensity, worse with breathing or exertion and better at rest   She denies fever, trauma, recent travel, other symptoms    HPI      Past Medical and Surgical History    Past Medical History:   Diagnosis Date    Asthma     Chronic pain     Cystic fibrosis ([facility] [address] )     Kidney stone     PONV (postoperative nausea and vomiting)     Transplant recipient        Past Surgical History:   Procedure Laterality Date    APPENDECTOMY      CHOLECYSTECTOMY      FL RETROGRADE PYELOGRAM  1/9/2020    FL RETROGRADE PYELOGRAM  2/4/2020    LUNG SURGERY      FL CYSTO/URETERO W/LITHOTRIPSY &INDWELL STENT INSRT Right 2/4/2020    Procedure: CYSTOSCOPY URETEROSCOPY WITH LITHOTRIPSY HOLMIUM LASER, RETROGRADE PYELOGRAM AND INSERTION STENT URETERAL;  Surgeon: Solitario Trejo MD;  Location: [facility] OR;  Service: Urology    FL CYSTOURETHROSCOPY,URETER CATHETER Right 1/9/2020    Procedure: CYSTOSCOPY RETROGRADE PYELOGRAM WITH INSERTION STENT URETERAL;  Surgeon: Solitario Trejo MD; Location: [facility];  Service: Urology       Family History   Family history unknown: Yes       Social History     Tobacco Use    Smoking status: Never Smoker    Smokeless tobacco: Never Used   Substance Use Topics    Alcohol use: Yes     Frequency: 2-4 times a month     Drinks per session: 1 or 2     Binge frequency: Never     Comment: social    Drug use: Yes     Frequency: 2 0 times per week     Types: Marijuana     Comment: medical edible marijuana prescribed           Allergies    Allergies   Allergen Reactions    Vancomycin Angioedema and Hives    Ceftazidime Headache     Severe headaches after desensitization       Home Medications    Prior to Admission medications    Medication Sig Start Date End Date Taking?  Authorizing Provider   albuterol (ACCUNEB) 1 25 MG/3ML nebulizer solution Take 1 ampule by nebulization every 4 (four) hours as needed for wheezing    Historical Provider, MD   alendronate (FOSAMAX) 70 mg tablet Take 70 mg by mouth every 7 days    Historical Provider, MD   ALPRAZolam Layvonne Ala) 1 mg tablet Take by mouth 2 (two) times a day as needed for anxiety    Historical Provider, MD   atovaquone (MEPRON) 750 mg/5 mL suspension Take 750 mg by mouth daily    Historical Provider, MD   colistimethate (COLY-MYCIN M) inhalation solution Take 150 mg by nebulization every 12 (twelve) hours    Historical Provider, MD   dexlansoprazole (DEXILANT) 60 MG capsule Take 60 mg by mouth daily    Historical Provider, MD   dronabinol (MARINOL) 5 MG capsule Take 5 mg by mouth 3 (three) times a day before meals    Historical Provider, MD   ferrous sulfate 325 (65 Fe) mg tablet Take 325 mg by mouth 2 (two) times a day with meals    Historical Provider, MD   gabapentin (NEURONTIN) 100 mg capsule Take 100 mg by mouth 2 (two) times a day as needed    Historical Provider, MD   magnesium oxide (MAG-OX) 400 mg Take 400 mg by mouth 2 (two) times a day    Historical Provider, MD   multivitamin (THERAGRAN) TABS Take 1 tablet by mouth daily    Historical Provider, MD   oxybutynin (DITROPAN-XL) 10 MG 24 hr tablet Take 1 tablet (10 mg total) by mouth daily for 5 doses 2/5/20 2/10/20  WAYNE Perez   oxyCODONE (ROXICODONE) 5 mg immediate release tablet Take 1 tablet (5 mg total) by mouth every 8 (eight) hours as needed for moderate painMax Daily Amount: 15 mg 1/11/20   Erika Peres MD   pancrelipase, Lip-Prot-Amyl, (CREON) 24,000 units Take 48,000 units of lipase by mouth 3 (three) times a day with meals     Historical Provider, MD   predniSONE 10 mg tablet Take by mouth daily    Historical Provider, MD   tacrolimus (PROGRAF) 1 mg capsule Take 2 mg by mouth every 12 (twelve) hours    Historical Provider, MD   valGANciclovir (VALCYTE) 450 mg tablet Take 900 mg by mouth daily with breakfast    Historical Provider, MD           Review of Systems    Review of Systems   Constitutional: Negative for activity change, chills and fever  HENT: Negative for drooling and facial swelling  Eyes: Negative for pain, discharge and visual disturbance  Respiratory: Positive for shortness of breath  Negative for apnea, cough, chest tightness and wheezing  Cardiovascular: Positive for chest pain  Negative for leg swelling  Gastrointestinal: Negative for abdominal pain, constipation, diarrhea, nausea and vomiting  Genitourinary: Negative for difficulty urinating, dysuria and urgency  Musculoskeletal: Negative for arthralgias, back pain and gait problem  Skin: Negative for color change and rash  Neurological: Negative for dizziness, speech difficulty, weakness and headaches  Psychiatric/Behavioral: Negative for agitation, behavioral problems and confusion  All other systems reviewed and negative      Physical Exam      ED Triage Vitals   Temperature Pulse Respirations Blood Pressure SpO2   03/06/20 1302 03/06/20 1302 03/06/20 1302 03/06/20 1302 03/06/20 1302   98 1 °F (36 7 °C) (!) 120 (!) 26 (!) 160/103 94 %      Temp Source Heart Rate Source Patient Position - Orthostatic VS BP Location FiO2 (%)   03/06/20 1302 03/06/20 1302 03/06/20 1302 03/06/20 1302 03/06/20 1843   Oral Monitor Lying Right arm 35      Pain Score       03/06/20 1302       5               Physical Exam   Constitutional: She is oriented to person, place, and time  She appears well-developed and well-nourished  She appears distressed  HENT:   Head: Normocephalic and atraumatic  Eyes: Pupils are equal, round, and reactive to light  Conjunctivae and EOM are normal    Neck: Normal range of motion  Neck supple  Cardiovascular: Normal rate, regular rhythm, normal heart sounds and intact distal pulses  Pulmonary/Chest: Effort normal  No respiratory distress  She has decreased breath sounds in the left upper field, the left middle field and the left lower field  She has no wheezes  She has rales in the right lower field  Abdominal: Soft  She exhibits no distension  There is no tenderness  There is no rebound and no guarding  Musculoskeletal: Normal range of motion  She exhibits no edema or deformity  Neurological: She is alert and oriented to person, place, and time  Skin: Skin is warm and dry  No rash noted  No erythema  Psychiatric: She has a normal mood and affect  Her behavior is normal  Judgment and thought content normal    Nursing note and vitals reviewed  Diagnostic Results  EKG Interpretation    Rate:  114  BPM  Rhythm:  Sinus tachycardia  Axis:  Normal   Intervals: Normal, no blocks, QTc  474 ms  Q waves:  No pathologic Q waves   T waves:  Normal   ST segments:  No significant elevations or depressions     Impression:  Sinus tachycardia without evidence of acute ischemia or significant arrhythmia      EKG for comparison:  EKG dated 22 February 2020 similar in character although normal sinus rhythm with 86 beats per minute at that time  EKG interpreted by me         Labs:    Results Reviewed     Procedure Component Value Units Date/Time    Procalcitonin [[id number]]  (Normal) Collected:  03/06/20 1321    Lab Status:  Final result Specimen:  Blood from Arm, Right Updated:  03/06/20 2213     Procalcitonin 0 05 ng/ml     Blood culture #1 [[id number]] Collected:  03/06/20 1322    Lab Status:  Preliminary result Specimen:  Blood from Arm, Left Updated:  03/06/20 2001     Blood Culture Received in Microbiology Lab  Culture in Progress  Blood culture #2 [[id number]] Collected:  03/06/20 1321    Lab Status:  Preliminary result Specimen:  Blood from Arm, Right Updated:  03/06/20 2001     Blood Culture Received in Microbiology Lab  Culture in Progress  Lactic acid x2 [[id number]]  (Abnormal) Collected:  03/06/20 1519    Lab Status:  Final result Specimen:  Blood from Central Venous Line Updated:  03/06/20 1603     LACTIC ACID 2 5 mmol/L     Narrative:       Result may be elevated if tourniquet was used during collection  Influenza A/B and RSV PCR [[id number]]  (Normal) Collected:  03/06/20 1321    Lab Status:  Final result Specimen:  Nose Updated:  03/06/20 1411     INFLUENZA A PCR None Detected     INFLUENZA B PCR None Detected     RSV PCR None Detected    Lactic acid x2 [[id number]]  (Abnormal) Collected:  03/06/20 1321    Lab Status:  Final result Specimen:  Blood from Arm, Right Updated:  03/06/20 1358     LACTIC ACID 2 5 mmol/L     Narrative:       Result may be elevated if tourniquet was used during collection      Comprehensive metabolic panel [[id number]]  (Abnormal) Collected:  03/06/20 1321    Lab Status:  Final result Specimen:  Blood from Arm, Right Updated:  03/06/20 1355     Sodium 138 mmol/L      Potassium 4 3 mmol/L      Chloride 103 mmol/L      CO2 22 mmol/L      ANION GAP 13 mmol/L      BUN 25 mg/dL      Creatinine 1 14 mg/dL      Glucose 209 mg/dL      Calcium 9 4 mg/dL      AST 96 U/L      ALT 93 U/L      Alkaline Phosphatase 93 U/L      Total Protein 8 2 g/dL      Albumin 3 8 g/dL      Total Bilirubin 0 40 mg/dL      eGFR 59 ml/min/1 73sq m     Narrative:       National Kidney Disease Foundation guidelines for Chronic Kidney Disease (CKD):     Stage 1 with normal or high GFR (GFR > 90 mL/min/1 73 square meters)    Stage 2 Mild CKD (GFR = 60-89 mL/min/1 73 square meters)    Stage 3A Moderate CKD (GFR = 45-59 mL/min/1 73 square meters)    Stage 3B Moderate CKD (GFR = 30-44 mL/min/1 73 square meters)    Stage 4 Severe CKD (GFR = 15-29 mL/min/1 73 square meters)    Stage 5 End Stage CKD (GFR <15 mL/min/1 73 square meters)  Note: GFR calculation is accurate only with a steady state creatinine    Protime-INR [[id number]]  (Normal) Collected:  03/06/20 1321    Lab Status:  Final result Specimen:  Blood from Arm, Right Updated:  03/06/20 1347     Protime 12 4 seconds      INR 0 92    APTT [[id number]]  (Normal) Collected:  03/06/20 1321    Lab Status:  Final result Specimen:  Blood from Arm, Right Updated:  03/06/20 1347     PTT 25 seconds     CBC and differential [[id number]]  (Abnormal) Collected:  03/06/20 1321    Lab Status:  Final result Specimen:  Blood from Arm, Right Updated:  03/06/20 1335     WBC 11 70 Thousand/uL      RBC 4 26 Million/uL      Hemoglobin 13 1 g/dL      Hematocrit 41 5 %      MCV 97 fL      MCH 30 8 pg      MCHC 31 6 g/dL      RDW 13 0 %      MPV 9 3 fL      Platelets 496 Thousands/uL      nRBC 0 /100 WBCs      Neutrophils Relative 87 %      Immat GRANS % 1 %      Lymphocytes Relative 8 %      Monocytes Relative 4 %      Eosinophils Relative 0 %      Basophils Relative 0 %      Neutrophils Absolute 10 14 Thousands/µL      Immature Grans Absolute 0 15 Thousand/uL      Lymphocytes Absolute 0 95 Thousands/µL      Monocytes Absolute 0 44 Thousand/µL      Eosinophils Absolute 0 00 Thousand/µL      Basophils Absolute 0 02 Thousands/µL     UA w Reflex to Microscopic w Reflex to Culture [[id number]]     Lab Status:  No result Specimen:  Urine           All labs reviewed and utilized in the medical decision making process    Radiology:    XR chest portable ICU   Final Result      1  Interval increased opacity in the right lung which may be due to consolidation superimposed on atelectasis, underlying pleural effusion is not excluded  The left lung remains clear  2   Endotracheal tube terminates 1 cm above the katelyn  Workstation performed: [id number]         CTA ED chest PE Study   Final Result      No pulmonary embolus  Patchy lateral left upper and left lower lobe pneumonia  Scarring at the left lung base with progressive volume loss and cystic bronchiectatic change in the right lung when compared to November 12, 2019  This examination was marked "immediate notification" in Epic in order to begin the standard process by which the radiology reading room liaison alerts the referring practitioner  Workstation performed: [id number]         XR chest portable   Final Result      Increased right basilar density with ipsilateral mediastinal shift suggesting volume loss due to atelectasis              Workstation performed: [id number]             All radiology studies independently viewed by me and interpreted by the radiologist     Procedure    CriticalCare Time  Performed by: Sue Recinos MD  Authorized by: Sue Recinos MD     Critical care provider statement:     Critical care time (minutes):  70    Critical care time was exclusive of:  Separately billable procedures and treating other patients and teaching time    Critical care was necessary to treat or prevent imminent or life-threatening deterioration of the following conditions:  Respiratory failure and sepsis    Critical care was time spent personally by me on the following activities:  Obtaining history from patient or surrogate, development of treatment plan with patient or surrogate, discussions with consultants, discussions with primary provider, evaluation of patient's response to treatment, examination of patient, ordering and review of radiographic studies, re-evaluation of patient's condition, ordering and review of laboratory studies, ordering and performing treatments and interventions and review of old charts            ED Course of Care and Re-Assessments      On arrival, patient in some respiratory distress, work of breathing and oxygenation improved with initiation of Vapotherm  Chest x-ray remarkable for atelectasis versus consolidation of right lower lobe, discussed case with Chapito Slater NP, from patient's transplant team who noted the patient recently admitted for influenza complicated by MRSA and Pseudomonas pneumonia, she recommended restarting meropenem and linezolid based on those recent cultures, those antibiotics begun in ER  Patient a requesting transfer to [address] [place] [address] where her transplant team is  Transfer center contacted transfer initiated [address] [place] [address] with Dr Barbie Elizondo accepting  Transfer delayed due to no capacity to receive at [address] [place] [address], after receiving an update stating may still have no beds available at her boarding patient's emergency department, plan formed to admit to this hospital pending availability of bed at that institution, patient comfortable with and accepting of that plan  After consultation with Dr Arielle Jarrett of critical care team, admitted to step-down unit, hemodynamically stable and comfortable on Vapotherm at that time    Medications   linezolid (ZYVOX) IVPB (premix) 600 mg (has no administration in time range)   meropenem (MERREM) 1,000 mg in sodium chloride 0 9 % 100 mL IVPB (has no administration in time range)   sodium chloride 0 9 % infusion (75 mL/hr Intravenous New Bag 3/6/20 7851)   heparin (porcine) subcutaneous injection 5,000 Units (has no administration in time range)   atovaquone (MEPRON) oral suspension 750 mg (has no administration in time range)   predniSONE tablet 10 mg (has no administration in time range) tacrolimus (PROGRAF) capsule 2 mg (2 mg Oral Not Given 3/6/20 2131)   valGANciclovir (VALCYTE) tablet 900 mg (has no administration in time range)   gabapentin (NEURONTIN) capsule 100 mg (100 mg Oral Not Given 3/6/20 2130)   dronabinol (MARINOL) capsule 5 mg (has no administration in time range)   Colistimethate Sodium (CBA) (COLY-MYCIN M) inhalation solution 150 mg (has no administration in time range)   levalbuterol (XOPENEX) inhalation solution 1 25 mg (has no administration in time range)   acetaminophen (TYLENOL) tablet 975 mg (975 mg Oral Not Given 3/6/20 2030)   lidocaine (LIDODERM) 5 % patch 1 patch (has no administration in time range)   propofol (DIPRIVAN) 1000 mg in 100 mL infusion (premix) (50 mcg/kg/min × 53 3 kg Intravenous Rate/Dose Change 3/6/20 2239)   fentaNYL 1000 mcg in sodium chloride 0 9% 100mL infusion (25 mcg/hr Intravenous New Bag 3/6/20 2241)   cisatracurium (NIMBEX) 200 mg in sodium chloride 0 9 % 500 mL infusion (has no administration in time range)     And   artificial tear (LUBRIFRESH P M ) ophthalmic ointment ( Both Eyes Not Given 3/6/20 2315)   epoprostenol (FLOLAN) 10,000 ng/mL in epoprostenol sterile diluent 36 mL inhalation solution (360,000 ng Inhalation New Bag 3/6/20 2317)   pantoprazole (PROTONIX) injection 40 mg (has no administration in time range)   albuterol (FOR EMS ONLY) (2 5 mg/3 mL) 0 083 % inhalation solution 2 5 mg (0 mg Does not apply Given to EMS 3/6/20 1313)   fentanyl citrate (PF) 100 MCG/2ML 50 mcg (50 mcg Intravenous Given 3/6/20 1336)   ipratropium-albuterol (DUO-NEB) 0 5-2 5 mg/3 mL inhalation solution 3 mL (3 mL Nebulization Given 3/6/20 1336)   meropenem (MERREM) 1,000 mg in sodium chloride 0 9 % 100 mL IVPB (0 mg Intravenous Stopped 3/6/20 1541)   linezolid (ZYVOX) IVPB (premix) 600 mg (0 mg Intravenous Stopped 3/6/20 1441)   sodium chloride 0 9 % bolus 1,000 mL (0 mL Intravenous Stopped 3/6/20 1541)     Followed by   sodium chloride 0 9 % bolus 1,000 mL (0 mL Intravenous Stopped 3/6/20 1735)   iohexol (OMNIPAQUE) 350 MG/ML injection (MULTI-DOSE) 85 mL (85 mL Intravenous Given 3/6/20 1410)   fentanyl citrate (PF) 100 MCG/2ML 50 mcg (50 mcg Intravenous Given 3/6/20 1447)   HYDROmorphone (DILAUDID) injection 0 5 mg (0 5 mg Intravenous Given 3/6/20 1531)   HYDROmorphone (DILAUDID) injection 0 2 mg (0 2 mg Intravenous Given 3/6/20 1756)   midazolam (VERSED) 2 mg/2 mL injection **ADS Override Pull** (  Given 3/6/20 2131)   fentanyl citrate (PF) 100 MCG/2ML **ADS Override Pull** (100 mcg Intravascular Given 3/6/20 2251)   fentanyl citrate (PF) 100 MCG/2ML 50 mcg (50 mcg Intravenous Given 3/6/20 2252)           FINAL IMPRESSION    Final diagnoses:   Hypoxemia   SOB (shortness of breath)   Multifocal pneumonia   Severe sepsis (HCC)         DISPOSITION/PLAN    Presentation as above felt most consistent with hypoxemia secondary to multifocal pneumonia causing severe sepsis  Initial treatment started in emergency department with fluid bolus and broad-spectrum antibiotics, CT PE study revealed pneumonia but no clot at this time  Accepted for transfer at [facility] but no capacity as above, admitted to critical care team pending bed availability as above, hemodynamically stable and comfortable at that time    Time reflects when diagnosis was documented in both MDM as applicable and the Disposition within this note     Time User Action Codes Description Comment    3/6/2020  1:56 PM Marnette Nabeeles Add [R09 02] Hypoxemia     3/6/2020  1:56 PM Marnette Riches Add [R06 02] SOB (shortness of breath)     3/6/2020  1:56 PM Marnette Nabeeles Add [J18 1] RLL pneumonia ([facility] [address] )     3/6/2020  5:15 PM Marnette Nabeeles Remove [J18 1] RLL pneumonia ([facility] [address] )     3/6/2020  5:18 PM Marnette Riches Add [J18 9] Multifocal pneumonia     3/6/2020  5:18 PM Marnette Riches Add [A41 9,  R65 20] Severe sepsis ([facility] [address] )     3/6/2020  5:18 PM Pb Olea Modify [R09 02] Hypoxemia     3/6/2020  5:18 PM Corrie ALEJANDRA Modify [J18 9] Multifocal pneumonia       ED Disposition     ED Disposition Condition Date/Time Comment    Admit Stable Fri Mar 6, 2020  5:15 PM Case was discussed with Critical Care and the patient's admission status was agreed to be Admission Status: inpatient status to the service of Dr Deepthi Melvin MD Documentation      Most Recent Value   Patient Condition  The patient has been stabilized such that within reasonable medical probability, no material deterioration of the patient condition or the condition of the unborn child(joseph) is likely to result from the transfer   Reason for Transfer  Patient/Family request   Benefits of Transfer  Specialized equipment and/or services available at the receiving facility (Include comment)________________________ [facility] Transplant Team, continuity of care]   Risks of Transfer  Potential for delay in receiving treatment, Increased discomfort during transfer, Loss of IV, Potential deterioration of medical condition, Possible worsening of condition or death during transfer   Accepting Physician  Dr Galeano Poseanali Name, [facility]   Sending MD Subha Bacon   Provider Certification  General risk, such as traffic hazards, adverse weather conditions, rough terrain or turbulence, possible failure of equipment (including vehicle or aircraft), or consequences of actions of persons outside the control of the transport personnel, Unanticipated needs of medical equipment and personnel during transport, Risk of worsening condition, The possibility of a transport vehicle being unavailable      RN Documentation      Most Recent Value   [address] Name, [address] Given to  SUSIE Medina at [facility]:    No follow-up provider specified      DISCHARGE MEDICATIONS:    Current Discharge Medication List      CONTINUE these medications which have NOT CHANGED    Details   albuterol (ACCUNEB) 1 25 MG/3ML nebulizer solution Take 1 ampule by nebulization every 4 (four) hours as needed for wheezing      alendronate (FOSAMAX) 70 mg tablet Take 70 mg by mouth every 7 days      ALPRAZolam (XANAX) 1 mg tablet Take by mouth 2 (two) times a day as needed for anxiety      atovaquone (MEPRON) 750 mg/5 mL suspension Take 750 mg by mouth daily      colistimethate (COLY-MYCIN M) inhalation solution Take 150 mg by nebulization every 12 (twelve) hours      dexlansoprazole (DEXILANT) 60 MG capsule Take 60 mg by mouth daily      dronabinol (MARINOL) 5 MG capsule Take 5 mg by mouth 3 (three) times a day before meals      ferrous sulfate 325 (65 Fe) mg tablet Take 325 mg by mouth 2 (two) times a day with meals      gabapentin (NEURONTIN) 100 mg capsule Take 100 mg by mouth 2 (two) times a day as needed      magnesium oxide (MAG-OX) 400 mg Take 400 mg by mouth 2 (two) times a day      multivitamin (THERAGRAN) TABS Take 1 tablet by mouth daily      oxybutynin (DITROPAN-XL) 10 MG 24 hr tablet Take 1 tablet (10 mg total) by mouth daily for 5 doses  Qty: 5 tablet, Refills: 0    Associated Diagnoses: Bladder spasm      oxyCODONE (ROXICODONE) 5 mg immediate release tablet Take 1 tablet (5 mg total) by mouth every 8 (eight) hours as needed for moderate painMax Daily Amount: 15 mg  Qty: 15 tablet, Refills: 0    Associated Diagnoses: Ureterolithiasis      pancrelipase, Lip-Prot-Amyl, (CREON) 24,000 units Take 48,000 units of lipase by mouth 3 (three) times a day with meals       predniSONE 10 mg tablet Take by mouth daily      tacrolimus (PROGRAF) 1 mg capsule Take 2 mg by mouth every 12 (twelve) hours      valGANciclovir (VALCYTE) 450 mg tablet Take 900 mg by mouth daily with breakfast             No discharge procedures on file           MD Bryanna Duran MD  03/06/20 3981

## 2020-03-06 NOTE — ED NOTES
XRay contacted regarding STAT portable chest      Ila Duarte, RN  03/06/20 1700 [facility], RN  03/06/20 8448

## 2020-03-07 VITALS
SYSTOLIC BLOOD PRESSURE: 128 MMHG | DIASTOLIC BLOOD PRESSURE: 89 MMHG | BODY MASS INDEX: 20.82 KG/M2 | WEIGHT: 117.5 LBS | OXYGEN SATURATION: 98 % | HEIGHT: 63 IN | TEMPERATURE: 99.3 F | HEART RATE: 86 BPM | RESPIRATION RATE: 20 BRPM

## 2020-03-07 LAB
L PNEUMO1 AG UR QL IA.RAPID: NEGATIVE
S PNEUM AG UR QL: NEGATIVE

## 2020-03-07 PROCEDURE — 99238 HOSP IP/OBS DSCHRG MGMT 30/<: CPT | Performed by: NURSE PRACTITIONER

## 2020-03-07 NOTE — DISCHARGE SUMMARY
Discharge- Sarika Freed 1975, 45 y o  female MRN: [id number]    Unit/Bed#:  Encounter: [id number]    Primary Care Provider: Yeison Caro MD   Date and time admitted to hospital: 3/6/2020  1:00 PM        Hypoxemic respiratory failure, chronic (HCC)  Assessment & Plan  Decompensated, requiring intubation and mechanical ventilation  Propofol and fentanyl for sedation  Inhaled Flolan per discussion with Dr Mai Hunter  Maintain peep between 8 and 10  Patient being transferred to [address] [place] [address] for further evaluation and treatment  Asthma  Assessment & Plan  Continue bronchodilators and steroids  Pancreatic insufficiency  Assessment & Plan  Continue pancreatic enzymes  C[facility] ([facility] )  Assessment & Plan  Continue incentive spirometry and airway clearance protocol  Continue bronchodilators  Encourage coughing and deep breathing  Continue pancreatic enzymes due to CF related pancreatic insufficiency  History of lung transplant [facility])  Assessment & Plan  Per [facility], continued on Prograf  Continued prednisone  Awaiting bed to open at  for transfer  * HCAP (healthcare-associated pneumonia)  Assessment & Plan  Continue meropenem and linezolid  Patient recently hospitalized at [address] [place] [address] for influenza B, where she completed a course of meropenem and linezolid in addition to Tamiflu  She has a history of multiple drug-resistant organisms per [address] [place] [address] records  Respiratory PCR negative for influenza and RSV  MRSA swab pending  Full respiratory PCR ordered and pending                  Resolved Problems  Date Reviewed: 3/6/2020    None          Admission Date:   Admission Orders (From admission, onward)     Ordered        03/06/20 1718  Inpatient Admission  Once                     Admitting Diagnosis: Hypoxemia [R09 02]  SOB (shortness of breath) [R06 02]  Severe sepsis ([facility] ) [A41 9, R65 20]  Multifocal pneumonia [J18 9]    HPI:  63-year-old female with past medical history significant for bilateral lung transplant in 7722, 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, cystic fibrosis, CF related diabetes, pancreatic insufficiency, GERD, DVT in 2017 status post 3 months anticoagulation, and chronic back pain managed on medical marijuana who presents with increasing shortness of breath since this morning  Patient recently hospitalized with influenza B at the [address] [place] [address] completing a course of meropenem, linezolid, in Tamiflu at the end of February  Patient states that she was in her usual state health until this morning when her work of breathing began to increase  Patient was found to have right sided atelectasis and evidence for pneumonia in her left upper and lower lobes on CT scan  Procedures Performed:   Orders Placed This Encounter   Procedures   [address] Intubation       Summary of Hospital Course: Patient was admitted to step-down unit on high-flow nasal cannula to support oxygenation  Over the course the evening, she decompensated and required intubation mechanical ventilation to support her breathing  Patient's pulmonologist at [facility] was consulted and was placed on inhaled Flolan  Patient being transferred to [address] [place] [address] for specialized care related to her transplanted lungs and cystic fibrosis in the setting of decompensated hypoxemic respiratory failure  Condition at Discharge: critical         Discharge instructions/Information to patient and family:   See after visit summary for information provided to patient and family  Provisions for Follow-Up Care:  See after visit summary for information related to follow-up care and any pertinent home health orders        PCP: Maya Joel MD    Disposition: Transfer to [place] [facility] [place]        Discharge Statement   I spent 30 minutes discharging the patient  This time was spent on the day of discharge  I had direct contact with the patient on the day of discharge  Additional documentation is required if more than 30 minutes were spent on discharge  Discharge Medications:  See after visit summary for reconciled discharge medications provided to patient and family

## 2020-03-07 NOTE — ASSESSMENT & PLAN NOTE
Continue meropenem and linezolid  Patient recently hospitalized at [facility] for influenza B, where she completed a course of meropenem and linezolid in addition to Tamiflu  She has a history of multiple drug-resistant organisms per [address] records  Respiratory PCR negative for influenza and RSV  MRSA swab pending  Full respiratory PCR ordered and pending

## 2020-03-07 NOTE — ASSESSMENT & PLAN NOTE
Continue high-flow nasal cannula to maintain saturations greater than 92%  Early mobilization and encourage incentive spirometry

## 2020-03-07 NOTE — NURSING NOTE
Critical Care transport team at bedside  All drips converted to transport pump  Labs and vent settings reviewed  Flolan syringe and propofol dispensed to transport team  Syringe Pump from [facility] ED loaned to transport team to complete Flolan en route per [facility] Pulmonology orders  Supervisor Mady Leiva aware of equipment loan

## 2020-03-07 NOTE — PROGRESS NOTES
Patient complaint of bilateral rib chest and back pain  Patient offered prn tylenol and scheduled nueorontin, declined  Patient with increased work of breatheing,anxiousness and shortness of breath  Prn xanax offered, and patient open to receive  Upon attempting to give xanax, patient requesting to be inbutbated, stating "this is how it felt before"  Returned to bedside with [employer]  Patient oxygen saturation declined into mid 60's with HFNC in place  HFNC titrated with no improvement  Patient emergently intubated  2 mg versed given 2134, 1 L NSS bolus at 2136, 4 mg versed 2137, 20 mg etomidate 2138, 100 mg succinylcholine 2138; 7 5 ETT 24 @ the lip  Additional 10 mg vecuronium dose given 2145  Patient turned to left side to improve to oxygenation

## 2020-03-07 NOTE — H&P
H&P- Marissa Fortune 1975, 45 y o  female MRN: [id number]    Unit/Bed#:  Encounter: [id number]    Primary Care Provider: Magdalena Choi MD   Date and time admitted to hospital: 3/6/2020  1:00 PM        Hypoxemic respiratory failure, chronic (HCC)  Assessment & Plan  Continue high-flow nasal cannula to maintain saturations greater than 92%  Early mobilization and encourage incentive spirometry  Asthma  Assessment & Plan  Continue bronchodilators and steroids  Cystic fibrosis ([facility] [address] )  Assessment & Plan  Continue incentive spirometry and airway clearance protocol  Continue bronchodilators  Encourage coughing and deep breathing  History of lung transplant [facility])  Assessment & Plan  Per [facility], restarted on Prograf  Continued prednisone  Awaiting bed to open at [facility] for transfer  * HCAP (healthcare-associated pneumonia)  Assessment & Plan  Continue meropenem and linezolid  Patient recently hospitalized at [address] [place] [address] for influenza B, where she completed a course of meropenem and linezolid in addition to Tamiflu  She has a history of multiple drug-resistant organisms per [address] records        H&P Exam - Critical Care   Marissa Fortune 45 y o  female MRN: [id number]  Unit/Bed#:  Encounter: [id number]      -------------------------------------------------------------------------------------------------------------  Chief Complaint:  Shortness of breath    History of Present Illness     Marissa Ayala is a 45 y o  female with past medical history significant for bilateral lung transplant 9926, complicated by rejection and bronchiolitis obliterans receiving a retransplantation of her left lung in March of 2017, cystic fibrosis, diabetes, pancreatic insufficiency, GERD, DVT 2017 status post 3 months anticoagulation, and chronic back pain on medical marijuana who presents with increasing shortness of breath since this morning  Patient was recently hospitalized with influenza [facility], completing a course of meropenem, linezolid, and Tamiflu at the end of February  Patient states she was in her usual state health until this morning when her work of breathing began to increase  Patient with significant right-sided atelectasis and evidence for pneumonia in her left upper and lower lobes  Patient was admitted to our step-down unit on high-flow nasal cannula to support oxygenation, and appears comfortable at time of her exam at 7:00 p m  History obtained from chart review and the patient   -------------------------------------------------------------------------------------------------------------  Disposition: Admit to Stepdown Level 1  Code Status: Level 1 - Full Code  --------------------------------------------------------------------------------------------------------------  Review of Systems   Constitutional: Positive for activity change  Negative for fatigue and fever  HENT: Negative  Eyes: Negative  Respiratory: Positive for cough, chest tightness, shortness of breath and wheezing  Negative for stridor  Cardiovascular: Positive for chest pain  Negative for palpitations and leg swelling  Gastrointestinal: Negative  Endocrine: Negative  Genitourinary: Negative  Musculoskeletal: Positive for back pain  Skin: Negative  Negative for color change and rash  Allergic/Immunologic: Negative  Neurological: Negative  Hematological: Negative  Psychiatric/Behavioral: Negative  A 12-point, complete review of systems was reviewed and negative except as stated above     Physical Exam   Constitutional: She is oriented to person, place, and time  She appears well-developed and well-nourished  No distress  Chronically ill-appearing   HENT:   Head: Normocephalic and atraumatic  Eyes: Pupils are equal, round, and reactive to light   Conjunctivae and EOM are normal    Neck: Normal range of motion  Neck supple  No JVD present  No tracheal deviation present  Cardiovascular: Normal rate, regular rhythm, normal heart sounds and intact distal pulses  Exam reveals no gallop and no friction rub  No murmur heard  Pulmonary/Chest: Effort normal  No stridor  No respiratory distress  She has wheezes  She exhibits tenderness  Very diminished in the right mid to lower base  Wheezes right apex and left fields  No subcu air or crepitus noted  Diminished excursion on the right  Abdominal: Soft  Bowel sounds are normal  She exhibits no distension  There is no rebound and no guarding  Musculoskeletal: Normal range of motion  Neurological: She is alert and oriented to person, place, and time  No cranial nerve deficit  Coordination normal    Skin: Skin is warm and dry  Capillary refill takes less than 2 seconds  No pallor  Psychiatric: She has a normal mood and affect  Her behavior is normal  Judgment and thought content normal    Nursing note and vitals reviewed        --------------------------------------------------------------------------------------------------------------  Historical Information   Past Medical History:   Diagnosis Date    Asthma     Chronic pain     Cystic fibrosis ([address] )     Kidney stone     PONV (postoperative nausea and vomiting)     Transplant recipient      Past Surgical History:   Procedure Laterality Date    APPENDECTOMY      CHOLECYSTECTOMY      FL RETROGRADE PYELOGRAM  1/9/2020    FL RETROGRADE PYELOGRAM  2/4/2020    LUNG SURGERY      NC CYSTO/URETERO W/LITHOTRIPSY &INDWELL STENT INSRT Right 2/4/2020    Procedure: CYSTOSCOPY URETEROSCOPY WITH LITHOTRIPSY HOLMIUM LASER, RETROGRADE PYELOGRAM AND INSERTION STENT URETERAL;  Surgeon: Erica Earl MD;  Location: [facility];  Service: Urology    NC CYSTOURETHROSCOPY,URETER CATHETER Right 1/9/2020    Procedure: CYSTOSCOPY RETROGRADE PYELOGRAM WITH INSERTION STENT URETERAL;  Surgeon: Erica Earl MD;  Location: [facility];  Service: Urology     Social History   Social History     Substance and Sexual Activity   Alcohol Use Yes    Frequency: 2-4 times a month    Drinks per session: 1 or 2    Binge frequency: Never    Comment: social     Social History     Substance and Sexual Activity   Drug Use Yes    Frequency: 2 0 times per week    Types: Marijuana    Comment: medical edible marijuana prescribed     Social History     Tobacco Use   Smoking Status Never Smoker   Smokeless Tobacco Never Used       Family History:   Family History   Family history unknown: Yes     I have reviewed this patient's family history and commented on sigificant items within the HPI    Vitals:   Vitals:    03/06/20 1830 03/06/20 1843 03/06/20 1900 03/06/20 2023   BP:  144/89 154/96    BP Location:  Left arm Left arm    Pulse:  99 95 90   Resp:  (!) 29 21 (!) 45   Temp: 98 7 °F (37 1 °C)  98 1 °F (36 7 °C)    TempSrc: Oral  Oral    SpO2:  (!) 89% 93% 96%   Weight: 53 3 kg (117 lb 8 1 oz)      Height: 5' 2 5" (1 588 m)        Temp  Min: 98 1 °F (36 7 °C)  Max: 98 7 °F (37 1 °C)  IBW: 51 25 kg  Height: 5' 2 5" (158 8 cm)  Body mass index is 21 15 kg/m²    N/A    Medications:  Current Facility-Administered Medications   Medication Dose Route Frequency    acetaminophen (TYLENOL) tablet 975 mg  975 mg Oral Q6H [facility]    cisatracurium (NIMBEX) 200 mg in sodium chloride 0 9 % 500 mL infusion  0 1-5 mcg/kg/min Intravenous Titrated    And    artificial tear (LUBRIFRESH P M ) ophthalmic ointment   Both Eyes Q2H    [START ON 3/7/2020] atovaquone (MEPRON) oral suspension 750 mg  750 mg Oral Daily With Breakfast    Colistimethate Sodium (CBA) (COLY-MYCIN M) inhalation solution 150 mg  150 mg Nebulization Q12H    [START ON 3/7/2020] dronabinol (MARINOL) capsule 5 mg  5 mg Oral TID AC    epoprostenol (FLOLAN) 10,000 ng/mL in epoprostenol sterile diluent 36 mL inhalation solution  10,000 ng/mL Inhalation Continuous    fentaNYL 1000 mcg in sodium chloride 0 9% 100mL infusion  25 mcg/hr Intravenous Continuous    gabapentin (NEURONTIN) capsule 100 mg  100 mg Oral BID    heparin (porcine) subcutaneous injection 5,000 Units  5,000 Units Subcutaneous Q8H [facility]    levalbuterol (XOPENEX) inhalation solution 1 25 mg  1 25 mg Nebulization Q8H PRN    [START ON 3/7/2020] lidocaine (LIDODERM) 5 % patch 1 patch  1 patch Topical Daily    [START ON 3/7/2020] linezolid (ZYVOX) IVPB (premix) 600 mg  600 mg Intravenous Q12H    [START ON 3/7/2020] meropenem (MERREM) 1,000 mg in sodium chloride 0 9 % 100 mL IVPB  1,000 mg Intravenous Q8H    midazolam (VERSED) 2 mg/2 mL injection **ADS Override Pull**        [START ON 3/7/2020] predniSONE tablet 10 mg  10 mg Oral Daily    propofol (DIPRIVAN) 1000 mg in 100 mL infusion (premix)  5-50 mcg/kg/min Intravenous Titrated    sodium chloride 0 9 % infusion  75 mL/hr Intravenous Continuous    tacrolimus (PROGRAF) capsule 2 mg  2 mg Oral Q12H [facility]    [START ON 3/7/2020] valGANciclovir (VALCYTE) tablet 900 mg  900 mg Oral Daily With Breakfast     Home medications:  Prior to Admission Medications   Prescriptions Last Dose Informant Patient Reported? Taking?    ALPRAZolam (XANAX) 1 mg tablet  Self Yes No   Sig: Take by mouth 2 (two) times a day as needed for anxiety   albuterol (ACCUNEB) 1 25 MG/3ML nebulizer solution  Self Yes No   Sig: Take 1 ampule by nebulization every 4 (four) hours as needed for wheezing   alendronate (FOSAMAX) 70 mg tablet  Self Yes No   Sig: Take 70 mg by mouth every 7 days   atovaquone (MEPRON) 750 mg/5 mL suspension  Self Yes No   Sig: Take 750 mg by mouth daily   colistimethate (COLY-MYCIN M) inhalation solution  Self Yes No   Sig: Take 150 mg by nebulization every 12 (twelve) hours   dexlansoprazole (DEXILANT) 60 MG capsule  Self Yes No   Sig: Take 60 mg by mouth daily   dronabinol (MARINOL) 5 MG capsule  Self Yes No   Sig: Take 5 mg by mouth 3 (three) times a day before meals   ferrous sulfate 325 (65 Fe) mg tablet  Self Yes No   Sig: Take 325 mg by mouth 2 (two) times a day with meals   gabapentin (NEURONTIN) 100 mg capsule  Self Yes No   Sig: Take 100 mg by mouth 2 (two) times a day as needed   magnesium oxide (MAG-OX) 400 mg  Self Yes No   Sig: Take 400 mg by mouth 2 (two) times a day   multivitamin (THERAGRAN) TABS  Self Yes No   Sig: Take 1 tablet by mouth daily   oxyCODONE (ROXICODONE) 5 mg immediate release tablet  Self No No   Sig: Take 1 tablet (5 mg total) by mouth every 8 (eight) hours as needed for moderate painMax Daily Amount: 15 mg   oxybutynin (DITROPAN-XL) 10 MG 24 hr tablet  Self No No   Sig: Take 1 tablet (10 mg total) by mouth daily for 5 doses   pancrelipase, Lip-Prot-Amyl, (CREON) 24,000 units  Self Yes No   Sig: Take 48,000 units of lipase by mouth 3 (three) times a day with meals    predniSONE 10 mg tablet  Self Yes No   Sig: Take by mouth daily   tacrolimus (PROGRAF) 1 mg capsule  Self Yes No   Sig: Take 2 mg by mouth every 12 (twelve) hours   valGANciclovir (VALCYTE) 450 mg tablet  Self Yes No   Sig: Take 900 mg by mouth daily with breakfast      Facility-Administered Medications: None     Allergies:   Allergies   Allergen Reactions    Vancomycin Angioedema and Hives    Ceftazidime Headache     Severe headaches after desensitization         Laboratory and Diagnostics:  Results from last 7 days   Lab Units 03/06/20  1321 03/03/20  1800   WBC Thousand/uL 11 70* 10 12   HEMOGLOBIN g/dL 13 1 12 9   HEMATOCRIT % 41 5 39 9   PLATELETS Thousands/uL 180 155   NEUTROS PCT % 87* 61   MONOS PCT % 4 12     Results from last 7 days   Lab Units 03/06/20  1321 03/03/20  1800   SODIUM mmol/L 138 137   POTASSIUM mmol/L 4 3 4 0   CHLORIDE mmol/L 103 102   CO2 mmol/L 22 25   ANION GAP mmol/L 13 10   BUN mg/dL 25 25   CREATININE mg/dL 1 14 1 00   CALCIUM mg/dL 9 4 9 5   GLUCOSE RANDOM mg/dL 209* 132   ALT U/L 93* 54   AST U/L 96* 18   ALK PHOS U/L 93 76 ALBUMIN g/dL 3 8 3 9   TOTAL BILIRUBIN mg/dL 0 40 0 40     Results from last 7 days   Lab Units 03/03/20  1800   MAGNESIUM mg/dL 2 0      Results from last 7 days   Lab Units 03/06/20  1321   INR  0 92   PTT seconds 25          Results from last 7 days   Lab Units 03/06/20  2224 03/06/20  1519 03/06/20  1321   LACTIC ACID mmol/L 1 8 2 5* 2 5*     ABG:  Results from last 7 days   Lab Units 03/06/20  2222   PH ART  7 452*   PCO2 ART mm Hg 23 3*   PO2 ART mm Hg 269 1*   HCO3 ART mmol/L 15 9*   BASE EXC ART mmol/L -6 4   ABG SOURCE  Radial, Right     VBG:  Results from last 7 days   Lab Units 03/06/20 2222   ABG SOURCE  Radial, Right     Results from last 7 days   Lab Units 03/06/20  1321   PROCALCITONIN ng/ml 0 05       Micro:  Results from last 7 days   Lab Units 03/06/20  1322 03/06/20  1321   BLOOD CULTURE  Received in Microbiology Lab  Culture in Progress  Received in Microbiology Lab  Culture in Progress  EKG:  Sinus tach  Imaging: I have personally reviewed pertinent reports  and I have personally reviewed pertinent films in PACS    ------------------------------------------------------------------------------------------------------------  Advance Directive and Living Will:      Power of :    POLST:    ------------------------------------------------------------------------------------------------------------  Anticipated Length of Stay is > 2 midnights    Counseling / Coordination of Care  Total Critical Care time spent 36 minutes excluding procedures, teaching and family updates  WAYNE Guerrero        Portions of the record may have been created with voice recognition software  Occasional wrong word or "sound a like" substitutions may have occurred due to the inherent limitations of voice recognition software    Read the chart carefully and recognize, using context, where substitutions have occurred

## 2020-03-07 NOTE — ASSESSMENT & PLAN NOTE
Decompensated, requiring intubation and mechanical ventilation  Propofol and fentanyl for sedation  Inhaled Flolan per discussion with Dr Suze Pandya  Maintain peep between 8 and 10  Patient being transferred to [address] [place] [address] for further evaluation and treatment

## 2020-03-07 NOTE — UTILIZATION REVIEW
Notification of Inpatient Admission/Inpatient Authorization Request   This is a Notification of Inpatient Admission for [address]  Be advised that this patient was admitted to our facility under Inpatient Status  Contact Duke Hdez at [phone] for additional admission information  [facility] DEPT DEDICATED Nilay Zavaleta [phone]  Patient Name:   Leandra Brandt   YOB: 1975       [address]   [address]:   [facility]    Tax ID: [id number]  NPI: [id number] Attending Provider/NPI: Gilma Abbasi Md [[id number]]    Place of Service Code: 24     Place of Service Name:  [facility]   Start Date: 3/6/20 1718     Discharge Date & Time: 3/7/2020  1:32 AM    Type of Admission: Inpatient Status Discharge Disposition   (if discharged): Non [address]   Patient Diagnoses: Hypoxemia [R09 02]  SOB (shortness of breath) [R06 02]  Severe sepsis ([address] ) [A41 9, R65 20]  Multifocal pneumonia [J18 9]     Orders: Admission Orders (From admission, onward)     Ordered        03/06/20 1718  Inpatient Admission  Once                    Assigned Utilization Review Contact: Duke Hdez  Utilization   Network Utilization Review Department  Phone: [phone]; Fax [phone]  Email: [email or web address]   ATTENTION PAYERS: Please call the assigned Utilization  directly with any questions or concerns ALL voicemails in the department are confidential  Send all requests for admission clinical reviews, approved or denied determinations and any other requests to dedicated fax number belonging to the campus where the patient is receiving treatment

## 2020-03-07 NOTE — UTILIZATION REVIEW
Initial Clinical Review    Admission: Date/Time/Statement: Admission Orders (From admission, onward)     Ordered        03/06/20 1718  Inpatient Admission  Once                   Orders Placed This Encounter   Procedures    Inpatient Admission     Standing Status:   Standing     Number of Occurrences:   1     Order Specific Question:   Admitting Physician     Answer:   Tania Earl [[id number]]     Order Specific Question:   Level of Care     Answer:   Level 2 Stepdown / HOT [14]     Order Specific Question:   Estimated length of stay     Answer:   More than 2 Midnights     Order Specific Question:   Certification     Answer:   I certify that inpatient services are medically necessary for this patient for a duration of greater than two midnights  See H&P and MD Progress Notes for additional information about the patient's course of treatment  ED Arrival Information     Expected Arrival Acuity Means of Arrival Escorted By Service Admission Type    - 3/6/2020 13:00 Emergent Ambulance [address]  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, cystic fibrosis, CF related diabetes, pancreatic insufficiency, GERD, DVT in 2017 status post 3 months anticoagulation, and chronic back pain managed on medical marijuana who presented to the ED from home with increasing shortness of breath since this morning  Patient recently hospitalized with influenza B at the [address] [place] [address] completing a course of meropenem, linezolid, in Tamiflu at the end of February    Patient states that she was in her usual state health until this morning when her work of breathing began to increase  Patient was found to have right sided atelectasis and evidence for pneumonia in her left upper and lower lobes on CT scan  Broad spectrum antibiotics initiated in ED  Physical exam: Very diminished in the right mid to lower base  Wheezes right apex and left fields  No subcu air or crepitus noted  Diminished excursion on the right  Patient was admitted to step-down unit on high-flow nasal cannula to support oxygenation  Awaiting bed to open at [facility] for transfer  Over the course the evening, the patient decompensated and required intubation and mechanical ventilation to support her breathing  3/6 11:19 PM Critical Care note:  Patient's oxygen saturation found to be in the 60s despite high-flow nasal cannula  FiO2 increased to 100% with 40 L without much improvement  Patient complaining of left-sided rib pain, and a sensation that her right long was going down  Tripodding, and unable to complete full sentences  Wheezes throughout left lung fields and right apex, with diminished breath sounds right mid to lower fields  Given extent of respiratory distress and hypoxia, decision made to intubate immediately  Discussed patient's change in status with Dr Maryann Mcintosh at [facility], and will attempt to expedite ICU bed for her  Patient's pulmonologist at [facility] recommended patient be placed on inhaled Flolan  Patient for transfer to [facility] for further evaluation and treatment       Patient was rransferred to [facility] by Critical Care transport team on 3/7/20 at 1:32 AM         ED Triage Vitals   Temperature Pulse Respirations Blood Pressure SpO2   03/06/20 1302 03/06/20 1302 03/06/20 1302 03/06/20 1302 03/06/20 1302   98 1 °F (36 7 °C) (!) 120 (!) 26 (!) 160/103 94 % 3L O2 NC      Temp Source Heart Rate Source Patient Position - Orthostatic VS BP Location FiO2 (%)   03/06/20 1302 03/06/20 1302 03/06/20 1302 03/06/20 1302 03/06/20 1843   Oral Monitor Lying Right arm 35      Pain Score       03/06/20 1302       5        Wt Readings from Last 1 Encounters:   03/06/20 53 3 kg (117 lb 8 1 oz)     Additional Vital Signs:     Date and Time Temp Pulse SpO2 Resp BP   03/07/20 0000 99 3 °F (37 4 °C) 86 98 % 20 128/89   03/06/20 2318 -- -- 98 % -- --   03/06/20 2300 99 5 °F (37 5 °C) -- -- -- 112/72   03/06/20 2300 -- 87 98 % 20 --   03/06/20 2257 -- -- 97 % Vent -- --   03/06/20 2200 -- 91 100 % 17 139/106   03/06/20 2100 -- 92 94 % 21 180/122   03/06/20 2023 -- 90 96 % 45 --   03/06/20 1900 98 1 °F (36 7 °C) 95 93 % 21 154/96   03/06/20 1843 -- 99 89 %HFNC 20L FiO2 35% 29 144/89   03/06/20 1830 98 7 °F (37 1 °C) -- -- -- --   03/06/20 1745 -- 96 97 % 22 131/92   03/06/20 1735 -- -- -- -- --   03/06/20 1730 -- 94 98 % 20 127/89   03/06/20 1645 -- 92 -- 22 124/84   03/06/20 1645 -- -- 98 % -- --   03/06/20 1519 -- -- -- -- --   03/06/20 1515 -- 94 98 % 20 123/76   03/06/20 1500 -- 97 97 % 22 131/81   03/06/20 1447 -- -- -- -- --   03/06/20 1419 -- -- -- -- --   03/06/20 1415 -- 100 97 % 25 148/94   03/06/20 1400 -- 105 93 % 33 144/85   03/06/20 1345 -- 108 93 % HFNC 26 138/108       Pertinent Labs/Diagnostic Test Results:     3/6 CTA chest: No pulmonary embolus  Patchy lateral left upper and left lower lobe pneumonia    Scarring at the left lung base with progressive volume loss and cystic bronchiectatic change in the right lung when compared to November 12, 2019      3/6 Chest x-ray:  Increased right basilar density with ipsilateral mediastinal shift suggesting volume loss due to atelectasis  3/6 EKG: sinus tachycardia, 114 BPM,  Without evidence of acute ischemia or significant arrhythmia       Results from last 7 days   Lab Units 03/06/20  1321 03/03/20  1800   WBC Thousand/uL 11 70* 10 12   HEMOGLOBIN g/dL 13 1 12 9   HEMATOCRIT % 41 5 39 9 PLATELETS Thousands/uL 180 155   NEUTROS ABS Thousands/µL 10 14* 6 25         Results from last 7 days   Lab Units 03/06/20  1321 03/03/20  1800   SODIUM mmol/L 138 137   POTASSIUM mmol/L 4 3 4 0   CHLORIDE mmol/L 103 102   CO2 mmol/L 22 25   ANION GAP mmol/L 13 10   BUN mg/dL 25 25   CREATININE mg/dL 1 14 1 00   EGFR ml/min/1 73sq m 59 70   CALCIUM mg/dL 9 4 9 5   MAGNESIUM mg/dL  --  2 0     Results from last 7 days   Lab Units 03/06/20  1321 03/03/20  1800   AST U/L 96* 18   ALT U/L 93* 54   ALK PHOS U/L 93 76   TOTAL PROTEIN g/dL 8 2 7 8   ALBUMIN g/dL 3 8 3 9   TOTAL BILIRUBIN mg/dL 0 40 0 40     Results from last 7 days   Lab Units 03/06/20  2059   POC GLUCOSE mg/dl 123     Results from last 7 days   Lab Units 03/06/20  1321 03/03/20  1800   GLUCOSE RANDOM mg/dL 209* 132             No results found for: BETA-HYDROXYBUTYRATE   Results from last 7 days   Lab Units 03/06/20  2222   PH ART  7 452*   PCO2 ART mm Hg 23 3*   PO2 ART mm Hg 269 1*   HCO3 ART mmol/L 15 9*   BASE EXC ART mmol/L -6 4   O2 CONTENT ART mL/dL 16 8   O2 HGB, ARTERIAL % 98 4*   ABG SOURCE  Radial, Right           Results from last 7 days   Lab Units 03/06/20  1321   PROTIME seconds 12 4   INR  0 92   PTT seconds 25         Results from last 7 days   Lab Units 03/06/20  1321   PROCALCITONIN ng/ml 0 05     Results from last 7 days   Lab Units 03/06/20  2224 03/06/20  1519 03/06/20  1321   LACTIC ACID mmol/L 1 8 2 5* 2 5*             Results from last 7 days   Lab Units 03/06/20  2224 03/06/20  1321   STREP PNEUMONIAE ANTIGEN, URINE  Negative  --    LEGIONELLA URINARY ANTIGEN  Negative  --    INFLUENZA A PCR   --  None Detected   INFLUENZA B PCR   --  None Detected   RSV PCR   --  None Detected     Results from last 7 days   Lab Units 03/06/20  2224   AMPH/METH  Negative   BARBITURATE UR  Negative   BENZODIAZEPINE UR  Positive*   COCAINE UR  Negative   METHADONE URINE  Negative   OPIATE UR  Negative   PCP UR  Negative   THC UR  Positive* Results from last 7 days   Lab Units 03/06/20  1322 03/06/20  1321   BLOOD CULTURE  Received in Microbiology Lab  Culture in Progress  Received in Microbiology Lab  Culture in Progress         ED Treatment:   Medication Administration from 03/06/2020 1300 to 03/06/2020 1828       Date/Time Order Dose Route Action     03/06/2020 1313 albuterol (FOR EMS ONLY) (2 5 mg/3 mL) 0 083 % inhalation solution 2 5 mg 0 mg Does not apply Given to EMS     03/06/2020 1336 fentanyl citrate (PF) 100 MCG/2ML 50 mcg 50 mcg Intravenous Given     03/06/2020 1336 ipratropium-albuterol (DUO-NEB) 0 5-2 5 mg/3 mL inhalation solution 3 mL 3 mL Nebulization Given     03/06/2020 1441 meropenem (MERREM) 1,000 mg in sodium chloride 0 9 % 100 mL IVPB 1,000 mg Intravenous New Bag     03/06/2020 1340 linezolid (ZYVOX) IVPB (premix) 600 mg 600 mg Intravenous New Bag     03/06/2020 1418 sodium chloride 0 9 % bolus 1,000 mL 1,000 mL Intravenous New Bag     03/06/2020 1541 sodium chloride 0 9 % bolus 1,000 mL 1,000 mL Intravenous New Bag     03/06/2020 1447 fentanyl citrate (PF) 100 MCG/2ML 50 mcg 50 mcg Intravenous Given     03/06/2020 1531 HYDROmorphone (DILAUDID) injection 0 5 mg 0 5 mg Intravenous Given     03/06/2020 1756 HYDROmorphone (DILAUDID) injection 0 2 mg 0 2 mg Intravenous Given        Past Medical History:   Diagnosis Date    Asthma     Chronic pain     Cystic fibrosis (HCC)     Kidney stone     PONV (postoperative nausea and vomiting)     Transplant recipient      Present on Admission:   Cystic fibrosis ([facility] )   Asthma   HCAP (healthcare-associated pneumonia)   Hypoxemic respiratory failure, chronic ([facility] )   Pancreatic insufficiency      Admitting Diagnosis: Hypoxemia [R09 02]  SOB (shortness of breath) [R06 02]  Severe sepsis ([facility] ) [A41 9, R65 20]  Multifocal pneumonia [J18 9]  Age/Sex: 45 y o  female       Admission Orders:  Cardiopulmonary monitoring, high flow nasal cannula, SCD,       Scheduled Medications:      Medications 03/05 03/06 03/07   acetaminophen (TYLENOL) tablet 975 mg   Dose: 975 mg  Freq: Every 6 hours scheduled Route: PO  Start: 03/06/20 2000 End: 03/07/20 0333     (2030) [C]      0000 0333-D/C'd      cisatracurium (NIMBEX) 200 mg in sodium chloride 0 9 % 500 mL infusion   Rate: 0 8-40 mL/hr Dose: 0 1-5 mcg/kg/min  Weight Dosing Info: 53 3 kg  Freq: Titrated Route: IV  Start: 03/06/20 2200 End: 03/07/20 0333    Admin Instructions:   Initial dose  3 mcg/kg/min  Titrate by 0 5 mcg/kg min every 30 minutes to achieve target train-of-four of 1/4  Ensure adequate sedation during infusion  Refrigerate  High-alert medication! 2200 0333-D/C'd      And  artificial tear (LUBRIFRESH P M ) ophthalmic ointment   Freq: Every 2 hours scheduled Route: Both Eyes  Start: 03/06/20 2200 End: 03/07/20 0333     (2315) [C]      0000 0333-D/C'd      atovaquone (MEPRON) oral suspension 750 mg   Dose: 750 mg  Freq: Daily with breakfast Route: PO  Start: 03/07/20 0730 End: 03/07/20 0743    Admin Instructions:   Must administer with food  Shake gently before use  0333-D/C'd      chlorhexidine (PERIDEX) 0 12 % oral rinse 15 mL   Dose: 15 mL  Freq: Every 12 hours scheduled Route: SWISH & SPIT  Start: 03/06/20 2330 End: 03/07/20 0333    Admin Instructions:   Swish orally for 30 seconds, then expectorate  Do not swallow  **DISPOSE IN 8 GALLON BLACK CONTAINER**     2330 0333-D/C'd      Colistimethate Sodium (CBA) (COLY-MYCIN M) inhalation solution 150 mg   Dose: 150 mg  Freq: Every 12 hours (RESP) Route: NEBULIZATION  Start: 03/06/20 2000 End: 03/07/20 0333    Admin Instructions:   FOR INHALATION ONLY  Administer promptly after preparation      Order specific questions:   Indication: pneumonia     2000 0333-D/C'd      dronabinol (MARINOL) capsule 5 mg   Dose: 5 mg  Freq: 3 times daily before meals Route: PO  Start: 03/07/20 0700 End: 03/07/20 8175    Admin Instructions:   High Alert Medication; Swallow whole; do not crush, chew or empty contents      0333-D/C'd        1336         gabapentin (NEURONTIN) capsule 100 mg   Dose: 100 mg  Freq: 2 times daily Route: PO  Start: 03/06/20 1945 End: 03/07/20 0333    Admin Instructions:   LOOK ALIKE SOUND ALIKE MED     (2130) [C]      0333-D/C'd      heparin (porcine) subcutaneous injection 5,000 Units   Dose: 5,000 Units  Freq: Every 8 hours scheduled Route: SC  Start: 03/06/20 2200 End: 03/07/20 0333    Admin Instructions:     2200 0333-D/C'd      ipratropium-albuterol (DUO-NEB) 0 5-2 5 mg/3 mL inhalation solution 3 mL   Dose: 3 mL  Freq: Every 6 hours (RESP) Route: NEBULIZATION  Start: 03/06/20 1330 End: 03/06/20 1336 1336         lidocaine (LIDODERM) 5 % patch 1 patch   Dose: 1 patch  Freq: Daily Route: TP  Start: 03/07/20 0900 End: 03/07/20 1760    Admin Instructions:   Patch may remain in place for up to 12 hours in a 24hr period    Order specific questions:   What is the location for this topical application? left ribs      0333-D/C'd      linezolid (ZYVOX) IVPB (premix) 600 mg   Dose: 600 mg  Freq: Every 12 hours Route: IV  Start: 03/07/20 0130 End: 03/07/20 0333    Admin Instructions:   Food-drug interaction teaching and documentation must be done;avoid large amounts of tyramine-containing foods (e g , aged, cured products) LOOK ALIKE SOUND ALIKE MED    Order specific questions:   Indication: pneumonia      0130     0333-D/C'd      linezolid (ZYVOX) IVPB (premix) 600 mg   Dose: 600 mg  Freq:  Once Route: IV  Last Dose: Stopped (03/06/20 1441)  Start: 03/06/20 1330 End: 03/06/20 1441    Admin Instructions:   Food-drug interaction teaching and documentation must be done;avoid large amounts of tyramine-containing foods (e g , aged, cured products) LOOK ALIKE SOUND ALIKE MED    Order specific questions:   Indication: pneumonia     1340     1441         meropenem (MERREM) 1,000 mg in sodium chloride 0 9 % 100 mL IVPB   Dose: 1,000 mg  Freq: Every 8 hours Route: IV  Start: 03/07/20 2200 End: 03/07/20 0333    Admin Instructions:   Refrigerate if in solution  Order specific questions:   Indication: pneumonia      0333-D/C'd      meropenem (MERREM) 1,000 mg in sodium chloride 0 9 % 100 mL IVPB   Dose: 1,000 mg  Freq: Once Route: IV  Last Dose: Stopped (03/06/20 1541)  Start: 03/06/20 1330 End: 03/06/20 1541    Admin Instructions:   Refrigerate if in solution  Order specific questions:   Indication: pneumonia     1441     1541         pantoprazole (PROTONIX) injection 40 mg   Dose: 40 mg  Freq: Every 12 hours scheduled Route: IV  Start: 03/06/20 2330 End: 03/07/20 0333    Admin Instructions:   Reconstitute with 10 mL 0 9% sodium chloride and administer as IVP over at least 2 minutes  Concentration = 4 mg/mL  LOOK ALIKE SOUND ALIKE MED     2330      0333-D/C'd      predniSONE tablet 10 mg   Dose: 10 mg  Freq: Daily Route: PO  Start: 03/07/20 0900 End: 03/07/20 1941    Admin Instructions:   Administer with food or milk  LOOK ALIKE SOUND ALIKE MED      0333-D/C'd      sodium chloride 0 9 % bolus 1,000 mL   Dose: 1,000 mL  Freq: Once Route: IV  Last Dose: Stopped (03/06/20 1541)  Start: 03/06/20 1400 End: 03/06/20 1541    Admin Instructions:   Bag #1 of 2     6269     1541         Followed by  sodium chloride 0 9 % bolus 1,000 mL   Dose: 1,000 mL  Freq:  Once Route: IV  Last Dose: Stopped (03/06/20 1735)  Start: 03/06/20 1429 End: 03/06/20 1735    Admin Instructions:   Bag #2 of 2  RN to Stop at 1536mL  Total Volume Final Bag Volume to infuse = 536mL     1541     1735         tacrolimus (PROGRAF) capsule 2 mg   Dose: 2 mg  Freq: Every 12 hours scheduled Route: PO  Start: 03/06/20 2100 End: 03/07/20 0333    Admin Instructions:   Hazardous agent; use appropriate precautions for handling and disposal  LOOK ALIKE SOUND ALIKE MED     (0292) [C]      0333-D/C'd      valGANciclovir (VALCYTE) tablet 900 mg   Dose: 900 mg  Freq: Daily with breakfast Route: PO  Start: 03/07/20 0730 End: 03/07/20 0333            0333-D/C'd      Medications 03/05 03/06 03/07     Continuous Medications:        Inactive     Active     Linked               Medications 03/05 03/06 03/07   cisatracurium (NIMBEX) 200 mg in sodium chloride 0 9 % 500 mL infusion   Rate: 0 8-40 mL/hr Dose: 0 1-5 mcg/kg/min  Weight Dosing Info: 53 3 kg  Freq: Titrated Route: IV  Start: 03/06/20 2200 End: 03/07/20 0333    Admin Instructions:   Initial dose  3 mcg/kg/min  Titrate by 0 5 mcg/kg min every 30 minutes to achieve target train-of-four of 1/4  Ensure adequate sedation during infusion  Refrigerate  High-alert medication! 2200 0333-D/C'd      And  artificial tear (LUBRIFRESH P M ) ophthalmic ointment   Freq: Every 2 hours scheduled Route: Both Eyes  Start: 03/06/20 2200 End: 03/07/20 0340    Admin Instructions:   Pull down the lower lid of the affected eye (s)  Apply a small amount (1/4 inch) of ointment to the inside of eyelid  (2315) [C]      0000     0333-D/C'd      epoprostenol (FLOLAN) 10,000 ng/mL in epoprostenol sterile diluent 36 mL inhalation solution   Rate: 8 mL/hr Dose: 74238 ng/mL  Freq: Continuous Route: IN  Last Dose: 360,000 ng (03/06/20 2317)  Start: 03/06/20 2230 End: 03/07/20 0333    Admin Instructions:   Dose 25 ng/kg/min, Ideal Body Weight 51 3 kg  Notify the pharmacy department to replace syringe after therapy started (Note: a replacement syringe must be kept on the patient care unit at all times)  FOR INHALATION ONLY  Refrigerate  PROTECT FROM LIGHT  High Alert Medication  Stable in pump reservoir for only 8 hours at room temp- total expiration time (refrigeration + room temp) not to exceed 48 hours  2317      0333-D/C'd      fentaNYL 1000 mcg in sodium chloride 0 9% 100mL infusion   Rate: 2 5 mL/hr Dose: 25 mcg/hr  Freq: Continuous Route: IV  Last Dose: 25 mcg/hr (03/06/20 2241)  Start: 03/06/20 2200 End: 03/07/20 0333    Admin Instructions:   Caution: Look-alike/sound-alike drug name! High-alert medication!     (2240) [C]     2241      0333-D/C'd      propofol (DIPRIVAN) 1000 mg in 100 mL infusion (premix)   Rate: 1 6-16 mL/hr Dose: 5-50 mcg/kg/min  Weight Dosing Info: 53 3 kg  Freq: Titrated Route: IV  Last Dose: 50 mcg/kg/min (03/06/20 2239)  Start: 03/06/20 2200 End: 03/07/20 0333    Admin Instructions:   Initial dose 5 mcg/kg/min  Titrate by 5 mcg/kg/min every 5 minutes to achieve target RASS score as ordered  High-alert medication! Shake well prior to use  Discard tubing and unused portion of vial after 12 hours  Order specific questions:   Target RASS 0: Alert and calm     2200 2229 2230 2239 0019     0333-D/C'd      sodium chloride 0 9 % infusion   Rate: 75 mL/hr Dose: 75 mL/hr  Freq: Continuous Route: IV  Indications of Use: IV Hydration  Last Dose: 75 mL/hr (03/06/20 2241)  Start: 03/06/20 1930 End: 03/07/20 0333 2241      0333-D/C'd      Medications 03/05 03/06 03/07       PRN Meds Sorted by Name   for Kike Ponce as of 03/07/20 1722    Legend:                          Inactive     Active     Other Encounter    Linked               Medications 02/27 02/28 02/29 03/01 03/02 03/03 03/04 03/05 03/06 03/07   ALPRAZolam (XANAX) tablet 1 mg   Dose: 1 mg  Freq: 2 times daily PRN Route: PO  PRN Reason: anxiety  Start: 03/06/20 1930 End: 03/06/20 2157    Admin Instructions:   High alert medication  LOOK ALIKE SOUND ALIKE MED            (2131) [C]     2157-D/C'd                1410         levalbuterol (XOPENEX) inhalation solution 1 25 mg   Dose: 1 25 mg  Freq: Every 8 hours PRN Route: NEBULIZATION  PRN Reason: wheezing  Start: 03/06/20 1934 End: 03/07/20 0333             0333-D/C'd      traMADol (ULTRAM) tablet 50 mg   Dose: 50 mg  Freq: Every 6 hours PRN Route: PO  PRN Reason: moderate pain  Start: 03/06/20 1930 End: 03/06/20 2157    Admin Instructions:   High Alert Medication   LOOK ALIKE SOUND ALIKE MED            2157-D/C'd                     Network Utilization Review Department  [email or web address]  ATTENTION: Please call with any questions or concerns to [phone] and carefully listen to the prompts so that you are directed to the right person  All voicemails are confidential   [place] all requests for admission clinical reviews, approved or denied determinations and any other requests to dedicated fax number below belonging to the campus where the patient is receiving treatment   List of dedicated fax numbers for the Facilities:  [address] DENIALS (Administrative/Medical Necessity) [phone]   [address] [place] [address] (Maternity/NICU/Pediatrics) [phone]   [facility] Perez [phone]   [facility] [phone]   [place] [phone]   [facility] [phone]   [address] [place] [address] [phone]   [facility]  [phone]   [address] [place]  [facility] [address] [facility] [phone]

## 2020-03-07 NOTE — RESPIRATORY THERAPY NOTE
RT Ventilator Management Note  Lsiha Fortune 45 y o  female MRN: [id number]  Unit/Bed#:  Encounter: [id number]      Daily Screen       3/6/2020  9983             Patient safety screen outcome[de-identified]  Failed    Not Ready for Weaning due to[de-identified]  FiO2 >60%;PEEP > 8cmH2O;Underline problem not resolved            Physical Exam:   Assessment Type: Assess only  General Appearance: Drowsy, Eyes open/responds to voice  Respiratory Pattern: Irregular, Assisted  Chest Assessment: Chest expansion symmetrical  Bilateral Breath Sounds: Coarse, Rhonchi, Scattered  R Breath Sounds: Diminished  L Breath Sounds: Clear  Cough: Productive(assisted)  Suction: ET Tube, Oral  O2 Device: mechanical ventilation  Subjective Data: resting without great apparent respiratory distress      Resp Comments: patient in acute respiratory distress electively intubated   pateint sedated intubated after 1 attempt size 7 5 24 at the teeth et tube in place and secure with anchor fast device tie tensiopn adeqasuet no evidence of skin breakdown noted at this time pateint beig sedated wihtout great apparent respiratory distress noted CXR revrals et tube in adeqauet posiiton recent ABG reveals adeqauet ventilation adn oxygenation  pateint placed on flolan per order doctors at transferring hospital plan: continue current support unitl further orders pateint to be transfered out to [facility]  at midnight this evening

## 2020-03-07 NOTE — ASSESSMENT & PLAN NOTE
Continue meropenem and linezolid  Patient recently hospitalized at [address] [place] [address] for influenza B, where she completed a course of meropenem and linezolid in addition to Tamiflu  She has a history of multiple drug-resistant organisms per [address] [place] [address] records

## 2020-03-07 NOTE — ASSESSMENT & PLAN NOTE
Continue incentive spirometry and airway clearance protocol  Continue bronchodilators  Encourage coughing and deep breathing

## 2020-03-07 NOTE — PROGRESS NOTES
Critical Care Medicine:  Significant Event  [facility]  03/06/20 2150  Called to bedside emergently secondary to patient's concerned that she needs to be intubated  Patient's oxygen saturation found to be in the 60s despite high-flow nasal cannula  FiO2 increased to 100% with 40 L without much improvement  Patient complaining of left-sided rib pain, and a sensation that her right long was going down  Tripodding, and unable to complete full sentences  Wheezes throughout left lung fields and right apex, with diminished breath sounds right mid to lower fields  Given extent of respiratory distress and hypoxia, decision made to intubate immediately  High-flow nasal cannula maintained throughout intubation for passive oxygenation in addition to bag-valve mask pre oxygenation  Aspiration precautions taken due to full stomach  Multiple attempts made to call patient's , however no answer and unable to leave message  Discussed patient's change in status with Dr Marley Kruse at [facility], and will attempt to expedite ICU bed for her  Additional critical care time 15 minutes for care coordination, excluding procedures and time spent by attending        WAYNE Chacon

## 2020-03-07 NOTE — ASSESSMENT & PLAN NOTE
Per [facility], restarted on Prograf  Continued prednisone  Awaiting bed to open at [facility] for transfer

## 2020-03-07 NOTE — ASSESSMENT & PLAN NOTE
Continue incentive spirometry and airway clearance protocol  Continue bronchodilators  Encourage coughing and deep breathing  Continue pancreatic enzymes due to CF related pancreatic insufficiency

## 2020-03-07 NOTE — PROCEDURES
Intubation  Date/Time: 3/6/2020 10:04 PM  Performed by: WAYNE Jo  Authorized by: Adrienne Carlson [address] [place] [address]     Patient location:  Bedside  Consent:     Consent obtained:  Emergent situation  Universal protocol:     Patient identity confirmed:  Arm band  Pre-procedure details:     Patient status:  Altered mental status    Mallampati score:  2    Pretreatment medications:  Midazolam and etomidate    Paralytics:  Succinylcholine and vecuronium  Indications:     Indications for intubation: respiratory distress, respiratory failure and hypoxemia    Procedure details:     Preoxygenation:  Bag valve mask    CPR in progress: no      Intubation method:  Oral    Oral intubation technique:  Glidescope    Laryngoscope blade: Mac 3    Tube size (mm):  7 5    Tube type:  Cuffed    Number of attempts:  1    Cricoid pressure: yes      Tube visualized through cords: yes    Placement assessment:     ETT to teeth:  24    Tube secured with:  ETT nazario    Breath sounds:  Equal, absent over the epigastrium and reduced on right    Placement verification: CXR verification and ETCO2 detector      CXR findings:  ETT in proper place    Ventilator settings:  360/24/100/+10  Post-procedure details:     Patient tolerance of procedure: Tolerated well, no immediate complications  Comments:      Patient requesting intubation secondary to sudden increased work of breathing and the sensation that her right lung is collapsing  Patient has experienced this in the past   High-flow nasal cannula at 100% and 40 L during intubation for passive oxygenation, in addition to bag-valve mask  Patient with full stomach, as she had just eaten dinner  Selleck maneuver employed during intubation  No visualized aspiration during intubation  Dr Cindi Phillips present for intubation

## 2020-03-07 NOTE — RESPIRATORY THERAPY NOTE
RT Ventilator Management Note  Valerie Fortune 45 y o  female MRN: [id number]  Unit/Bed#:  Encounter: [id number]      Daily Screen     No data found in the last 10 encounters  Physical Exam:   Assessment Type: Assess only  General Appearance: Alert  Respiratory Pattern: Irregular, Spontaneous, Assisted, Tachypneic  Chest Assessment: Chest expansion symmetrical  Bilateral Breath Sounds: Clear, Diminished  R Breath Sounds: Diminished  L Breath Sounds: Clear  Cough: None  O2 Device: HFNC  Subjective Data: awake and in noticeable distress      Resp Comments: pateint remains on HFNC settings adjusted at this time due to pain pateint states she had been given nebulizers earlier today wuightout relief nursing made aware of patients pain awaiting pain medication   plan: conintue ucrre t oxygen therapy and wean HFNC to mauinatain adeqauet ventilation and oxygenation

## 2020-03-07 NOTE — ASSESSMENT & PLAN NOTE
[facility], continued on Prograf  Continued prednisone  Awaiting bed to open at [facility] for transfer

## 2020-03-09 LAB
ATRIAL RATE: 114 BPM
P AXIS: 69 DEGREES
PR INTERVAL: 136 MS
QRS AXIS: 78 DEGREES
QRSD INTERVAL: 68 MS
QT INTERVAL: 344 MS
QTC INTERVAL: 474 MS
T WAVE AXIS: 63 DEGREES
VENTRICULAR RATE: 114 BPM

## 2020-03-09 PROCEDURE — 93010 ELECTROCARDIOGRAM REPORT: CPT | Performed by: INTERNAL MEDICINE

## 2020-03-14 LAB
BACTERIA BLD CULT: ABNORMAL
BACTERIA BLD CULT: ABNORMAL
GRAM STN SPEC: ABNORMAL
GRAM STN SPEC: ABNORMAL

## 2020-03-31 ENCOUNTER — HOSPITAL ENCOUNTER (INPATIENT)
Facility: HOSPITAL | Age: 45
LOS: 2 days | Discharge: ANOTHER HOSPITAL | DRG: 194 | End: 2020-04-02
Attending: EMERGENCY MEDICINE | Admitting: FAMILY MEDICINE
Payer: COMMERCIAL

## 2020-03-31 ENCOUNTER — APPOINTMENT (EMERGENCY)
Dept: RADIOLOGY | Facility: HOSPITAL | Age: 45
DRG: 194 | End: 2020-03-31
Payer: COMMERCIAL

## 2020-03-31 DIAGNOSIS — R06.00 ACUTE DYSPNEA: Primary | ICD-10-CM

## 2020-03-31 DIAGNOSIS — J18.9 PNEUMONIA INVOLVING RIGHT LUNG: ICD-10-CM

## 2020-03-31 DIAGNOSIS — J18.9 HCAP (HEALTHCARE-ASSOCIATED PNEUMONIA): ICD-10-CM

## 2020-03-31 PROBLEM — G89.4 CHRONIC PAIN DISORDER: Status: ACTIVE | Noted: 2020-03-31

## 2020-03-31 PROBLEM — J96.21 ACUTE AND CHRONIC RESPIRATORY FAILURE WITH HYPOXIA (HCC): Status: ACTIVE | Noted: 2020-03-31

## 2020-03-31 PROBLEM — I82.419 DVT FEMORAL (DEEP VENOUS THROMBOSIS) (HCC): Status: ACTIVE | Noted: 2020-03-31

## 2020-03-31 LAB
ABO GROUP BLD: NORMAL
ALBUMIN SERPL BCP-MCNC: 2.3 G/DL (ref 3.5–5)
ALP SERPL-CCNC: 103 U/L (ref 46–116)
ALT SERPL W P-5'-P-CCNC: 49 U/L (ref 12–78)
ANION GAP SERPL CALCULATED.3IONS-SCNC: 6 MMOL/L (ref 4–13)
APTT PPP: 30 SECONDS (ref 23–37)
AST SERPL W P-5'-P-CCNC: 32 U/L (ref 5–45)
ATRIAL RATE: 97 BPM
BACTERIA UR QL AUTO: ABNORMAL /HPF
BASOPHILS # BLD AUTO: 0.02 THOUSANDS/ΜL (ref 0–0.1)
BASOPHILS NFR BLD AUTO: 0 % (ref 0–1)
BILIRUB DIRECT SERPL-MCNC: 0.13 MG/DL (ref 0–0.2)
BILIRUB SERPL-MCNC: 0.3 MG/DL (ref 0.2–1)
BILIRUB UR QL STRIP: NEGATIVE
BLD GP AB SCN SERPL QL: NEGATIVE
BUN SERPL-MCNC: 19 MG/DL (ref 5–25)
CALCIUM SERPL-MCNC: 8.5 MG/DL (ref 8.3–10.1)
CHLORIDE SERPL-SCNC: 106 MMOL/L (ref 100–108)
CLARITY UR: ABNORMAL
CO2 SERPL-SCNC: 29 MMOL/L (ref 21–32)
COLOR UR: ABNORMAL
CREAT SERPL-MCNC: 0.91 MG/DL (ref 0.6–1.3)
EOSINOPHIL # BLD AUTO: 0.11 THOUSAND/ΜL (ref 0–0.61)
EOSINOPHIL NFR BLD AUTO: 1 % (ref 0–6)
ERYTHROCYTE [DISTWIDTH] IN BLOOD BY AUTOMATED COUNT: 15.6 % (ref 11.6–15.1)
EXT PREG TEST URINE: NEGATIVE
EXT. CONTROL ED NAV: NORMAL
FLUAV RNA NPH QL NAA+PROBE: NORMAL
FLUBV RNA NPH QL NAA+PROBE: NORMAL
GFR SERPL CREATININE-BSD FRML MDRD: 78 ML/MIN/1.73SQ M
GLUCOSE SERPL-MCNC: 113 MG/DL (ref 65–140)
GLUCOSE SERPL-MCNC: 151 MG/DL (ref 65–140)
GLUCOSE SERPL-MCNC: 302 MG/DL (ref 65–140)
GLUCOSE UR STRIP-MCNC: ABNORMAL MG/DL
HCT VFR BLD AUTO: 26.4 % (ref 34.8–46.1)
HGB BLD-MCNC: 7.7 G/DL (ref 11.5–15.4)
HGB UR QL STRIP.AUTO: ABNORMAL
IMM GRANULOCYTES # BLD AUTO: 0.07 THOUSAND/UL (ref 0–0.2)
IMM GRANULOCYTES NFR BLD AUTO: 1 % (ref 0–2)
INR PPP: 1.31 (ref 0.84–1.19)
KETONES UR STRIP-MCNC: NEGATIVE MG/DL
LACTATE SERPL-SCNC: 1.3 MMOL/L (ref 0.5–2)
LACTATE SERPL-SCNC: 2.1 MMOL/L (ref 0.5–2)
LACTATE SERPL-SCNC: 2.3 MMOL/L (ref 0.5–2)
LEUKOCYTE ESTERASE UR QL STRIP: ABNORMAL
LYMPHOCYTES # BLD AUTO: 1.53 THOUSANDS/ΜL (ref 0.6–4.47)
LYMPHOCYTES NFR BLD AUTO: 16 % (ref 14–44)
MAGNESIUM SERPL-MCNC: 1.6 MG/DL (ref 1.6–2.6)
MCH RBC QN AUTO: 27.3 PG (ref 26.8–34.3)
MCHC RBC AUTO-ENTMCNC: 29.2 G/DL (ref 31.4–37.4)
MCV RBC AUTO: 94 FL (ref 82–98)
MONOCYTES # BLD AUTO: 0.23 THOUSAND/ΜL (ref 0.17–1.22)
MONOCYTES NFR BLD AUTO: 2 % (ref 4–12)
NEUTROPHILS # BLD AUTO: 7.49 THOUSANDS/ΜL (ref 1.85–7.62)
NEUTS SEG NFR BLD AUTO: 80 % (ref 43–75)
NITRITE UR QL STRIP: NEGATIVE
NON-SQ EPI CELLS URNS QL MICRO: ABNORMAL /HPF
NRBC BLD AUTO-RTO: 0 /100 WBCS
OTHER STN SPEC: ABNORMAL
P AXIS: 43 DEGREES
PH UR STRIP.AUTO: 6 [PH]
PLATELET # BLD AUTO: 184 THOUSANDS/UL (ref 149–390)
PMV BLD AUTO: 9.5 FL (ref 8.9–12.7)
POTASSIUM SERPL-SCNC: 3.6 MMOL/L (ref 3.5–5.3)
PR INTERVAL: 116 MS
PROT SERPL-MCNC: 6 G/DL (ref 6.4–8.2)
PROT UR STRIP-MCNC: NEGATIVE MG/DL
PROTHROMBIN TIME: 16.4 SECONDS (ref 11.6–14.5)
QRS AXIS: 66 DEGREES
QRSD INTERVAL: 70 MS
QT INTERVAL: 330 MS
QTC INTERVAL: 419 MS
RBC # BLD AUTO: 2.82 MILLION/UL (ref 3.81–5.12)
RBC #/AREA URNS AUTO: ABNORMAL /HPF
RH BLD: NEGATIVE
RSV RNA NPH QL NAA+PROBE: NORMAL
SODIUM SERPL-SCNC: 141 MMOL/L (ref 136–145)
SP GR UR STRIP.AUTO: 1.02 (ref 1–1.03)
SPECIMEN EXPIRATION DATE: NORMAL
T WAVE AXIS: 53 DEGREES
TROPONIN I SERPL-MCNC: <0.02 NG/ML
UROBILINOGEN UR QL STRIP.AUTO: 0.2 E.U./DL
VENTRICULAR RATE: 97 BPM
WBC # BLD AUTO: 9.45 THOUSAND/UL (ref 4.31–10.16)
WBC #/AREA URNS AUTO: ABNORMAL /HPF

## 2020-03-31 PROCEDURE — 83735 ASSAY OF MAGNESIUM: CPT | Performed by: EMERGENCY MEDICINE

## 2020-03-31 PROCEDURE — 99285 EMERGENCY DEPT VISIT HI MDM: CPT | Performed by: EMERGENCY MEDICINE

## 2020-03-31 PROCEDURE — 99223 1ST HOSP IP/OBS HIGH 75: CPT | Performed by: INTERNAL MEDICINE

## 2020-03-31 PROCEDURE — 87040 BLOOD CULTURE FOR BACTERIA: CPT | Performed by: EMERGENCY MEDICINE

## 2020-03-31 PROCEDURE — 86900 BLOOD TYPING SEROLOGIC ABO: CPT | Performed by: EMERGENCY MEDICINE

## 2020-03-31 PROCEDURE — 86901 BLOOD TYPING SEROLOGIC RH(D): CPT | Performed by: EMERGENCY MEDICINE

## 2020-03-31 PROCEDURE — 36415 COLL VENOUS BLD VENIPUNCTURE: CPT | Performed by: EMERGENCY MEDICINE

## 2020-03-31 PROCEDURE — 96361 HYDRATE IV INFUSION ADD-ON: CPT

## 2020-03-31 PROCEDURE — 94640 AIRWAY INHALATION TREATMENT: CPT

## 2020-03-31 PROCEDURE — 80048 BASIC METABOLIC PNL TOTAL CA: CPT | Performed by: EMERGENCY MEDICINE

## 2020-03-31 PROCEDURE — 94664 DEMO&/EVAL PT USE INHALER: CPT

## 2020-03-31 PROCEDURE — 83605 ASSAY OF LACTIC ACID: CPT | Performed by: EMERGENCY MEDICINE

## 2020-03-31 PROCEDURE — 82948 REAGENT STRIP/BLOOD GLUCOSE: CPT

## 2020-03-31 PROCEDURE — 84484 ASSAY OF TROPONIN QUANT: CPT | Performed by: EMERGENCY MEDICINE

## 2020-03-31 PROCEDURE — 96374 THER/PROPH/DIAG INJ IV PUSH: CPT

## 2020-03-31 PROCEDURE — 71045 X-RAY EXAM CHEST 1 VIEW: CPT

## 2020-03-31 PROCEDURE — 86850 RBC ANTIBODY SCREEN: CPT | Performed by: EMERGENCY MEDICINE

## 2020-03-31 PROCEDURE — 99449 NTRPROF PH1/NTRNET/EHR 31/>: CPT | Performed by: INTERNAL MEDICINE

## 2020-03-31 PROCEDURE — 83605 ASSAY OF LACTIC ACID: CPT | Performed by: FAMILY MEDICINE

## 2020-03-31 PROCEDURE — 81001 URINALYSIS AUTO W/SCOPE: CPT | Performed by: EMERGENCY MEDICINE

## 2020-03-31 PROCEDURE — 85025 COMPLETE CBC W/AUTO DIFF WBC: CPT | Performed by: EMERGENCY MEDICINE

## 2020-03-31 PROCEDURE — 85730 THROMBOPLASTIN TIME PARTIAL: CPT | Performed by: EMERGENCY MEDICINE

## 2020-03-31 PROCEDURE — 86923 COMPATIBILITY TEST ELECTRIC: CPT

## 2020-03-31 PROCEDURE — 85610 PROTHROMBIN TIME: CPT | Performed by: EMERGENCY MEDICINE

## 2020-03-31 PROCEDURE — 99285 EMERGENCY DEPT VISIT HI MDM: CPT

## 2020-03-31 PROCEDURE — 94760 N-INVAS EAR/PLS OXIMETRY 1: CPT

## 2020-03-31 PROCEDURE — 81025 URINE PREGNANCY TEST: CPT | Performed by: EMERGENCY MEDICINE

## 2020-03-31 PROCEDURE — 87631 RESP VIRUS 3-5 TARGETS: CPT | Performed by: EMERGENCY MEDICINE

## 2020-03-31 PROCEDURE — 80076 HEPATIC FUNCTION PANEL: CPT | Performed by: EMERGENCY MEDICINE

## 2020-03-31 PROCEDURE — 93010 ELECTROCARDIOGRAM REPORT: CPT | Performed by: INTERNAL MEDICINE

## 2020-03-31 PROCEDURE — 93005 ELECTROCARDIOGRAM TRACING: CPT

## 2020-03-31 RX ORDER — PANTOPRAZOLE SODIUM 40 MG/1
40 TABLET, DELAYED RELEASE ORAL
Status: DISCONTINUED | OUTPATIENT
Start: 2020-04-01 | End: 2020-04-02 | Stop reason: HOSPADM

## 2020-03-31 RX ORDER — HYDROMORPHONE HCL/PF 1 MG/ML
0.5 SYRINGE (ML) INJECTION ONCE
Status: COMPLETED | OUTPATIENT
Start: 2020-03-31 | End: 2020-03-31

## 2020-03-31 RX ORDER — FERROUS SULFATE 325(65) MG
325 TABLET ORAL
COMMUNITY
End: 2021-09-26 | Stop reason: HOSPADM

## 2020-03-31 RX ORDER — MINOCYCLINE HYDROCHLORIDE 100 MG/1
100 TABLET ORAL 2 TIMES DAILY
COMMUNITY
End: 2020-11-05

## 2020-03-31 RX ORDER — POLYETHYLENE GLYCOL 3350 17 G/17G
17 POWDER, FOR SOLUTION ORAL DAILY PRN
Status: DISCONTINUED | OUTPATIENT
Start: 2020-03-31 | End: 2020-04-02 | Stop reason: HOSPADM

## 2020-03-31 RX ORDER — SODIUM CHLORIDE FOR INHALATION 0.9 %
3 VIAL, NEBULIZER (ML) INHALATION
Status: DISCONTINUED | OUTPATIENT
Start: 2020-03-31 | End: 2020-04-02 | Stop reason: HOSPADM

## 2020-03-31 RX ORDER — ALPRAZOLAM 1 MG/1
1 TABLET ORAL 2 TIMES DAILY PRN
COMMUNITY
End: 2020-05-15

## 2020-03-31 RX ORDER — HYDROMORPHONE HCL/PF 1 MG/ML
0.5 SYRINGE (ML) INJECTION EVERY 6 HOURS PRN
Status: DISCONTINUED | OUTPATIENT
Start: 2020-03-31 | End: 2020-04-02 | Stop reason: HOSPADM

## 2020-03-31 RX ORDER — OXYCODONE HYDROCHLORIDE 5 MG/1
10 TABLET ORAL EVERY 4 HOURS PRN
COMMUNITY
End: 2020-11-05

## 2020-03-31 RX ORDER — OXYCODONE HYDROCHLORIDE 5 MG/1
5 TABLET ORAL EVERY 4 HOURS PRN
Status: DISCONTINUED | OUTPATIENT
Start: 2020-03-31 | End: 2020-03-31

## 2020-03-31 RX ORDER — OXYCODONE HYDROCHLORIDE 10 MG/1
10 TABLET ORAL EVERY 4 HOURS PRN
Status: DISCONTINUED | OUTPATIENT
Start: 2020-03-31 | End: 2020-04-02 | Stop reason: HOSPADM

## 2020-03-31 RX ORDER — MINOCYCLINE HYDROCHLORIDE 50 MG/1
100 CAPSULE ORAL 2 TIMES DAILY
Status: DISCONTINUED | OUTPATIENT
Start: 2020-03-31 | End: 2020-04-02 | Stop reason: HOSPADM

## 2020-03-31 RX ORDER — GABAPENTIN 100 MG/1
100 CAPSULE ORAL 2 TIMES DAILY PRN
COMMUNITY
End: 2020-11-05

## 2020-03-31 RX ORDER — DEXLANSOPRAZOLE 60 MG/1
60 CAPSULE, DELAYED RELEASE ORAL DAILY
COMMUNITY
End: 2021-09-26 | Stop reason: HOSPADM

## 2020-03-31 RX ORDER — ATOVAQUONE 750 MG/5ML
1500 SUSPENSION ORAL DAILY
Status: DISCONTINUED | OUTPATIENT
Start: 2020-03-31 | End: 2020-04-02 | Stop reason: HOSPADM

## 2020-03-31 RX ORDER — LEVALBUTEROL 1.25 MG/.5ML
1.25 SOLUTION, CONCENTRATE RESPIRATORY (INHALATION)
Status: DISCONTINUED | OUTPATIENT
Start: 2020-03-31 | End: 2020-04-02 | Stop reason: HOSPADM

## 2020-03-31 RX ORDER — GABAPENTIN 100 MG/1
100 CAPSULE ORAL 2 TIMES DAILY
Status: DISCONTINUED | OUTPATIENT
Start: 2020-03-31 | End: 2020-04-02 | Stop reason: HOSPADM

## 2020-03-31 RX ORDER — DRONABINOL 2.5 MG/1
5 CAPSULE ORAL
Status: DISCONTINUED | OUTPATIENT
Start: 2020-03-31 | End: 2020-04-02 | Stop reason: HOSPADM

## 2020-03-31 RX ORDER — ALENDRONATE SODIUM 70 MG/1
70 TABLET ORAL
COMMUNITY
End: 2021-09-26 | Stop reason: HOSPADM

## 2020-03-31 RX ORDER — MULTIVITAMIN
1 TABLET ORAL DAILY
COMMUNITY
End: 2021-09-26 | Stop reason: HOSPADM

## 2020-03-31 RX ORDER — BENZONATATE 100 MG/1
100 CAPSULE ORAL 3 TIMES DAILY PRN
Status: DISCONTINUED | OUTPATIENT
Start: 2020-03-31 | End: 2020-04-02 | Stop reason: HOSPADM

## 2020-03-31 RX ORDER — POLYETHYLENE GLYCOL 3350 17 G/17G
17 POWDER, FOR SOLUTION ORAL 2 TIMES DAILY PRN
COMMUNITY
End: 2022-04-12 | Stop reason: ALTCHOICE

## 2020-03-31 RX ORDER — TACROLIMUS 5 MG/1
2.5 CAPSULE ORAL
COMMUNITY
End: 2021-09-26 | Stop reason: HOSPADM

## 2020-03-31 RX ORDER — ACETAMINOPHEN 325 MG/1
650 TABLET ORAL EVERY 6 HOURS PRN
Status: DISCONTINUED | OUTPATIENT
Start: 2020-03-31 | End: 2020-04-02 | Stop reason: HOSPADM

## 2020-03-31 RX ORDER — ATOVAQUONE 750 MG/5ML
1500 SUSPENSION ORAL DAILY
COMMUNITY

## 2020-03-31 RX ORDER — DRONABINOL 5 MG/1
5 CAPSULE ORAL
COMMUNITY
End: 2020-09-15

## 2020-03-31 RX ORDER — FERROUS SULFATE 325(65) MG
325 TABLET ORAL
Status: DISCONTINUED | OUTPATIENT
Start: 2020-04-01 | End: 2020-04-02 | Stop reason: HOSPADM

## 2020-03-31 RX ORDER — MINOCYCLINE HYDROCHLORIDE 100 MG/1
100 CAPSULE ORAL ONCE
Status: COMPLETED | OUTPATIENT
Start: 2020-03-31 | End: 2020-03-31

## 2020-03-31 RX ORDER — ALPRAZOLAM 0.5 MG/1
1 TABLET ORAL 2 TIMES DAILY PRN
Status: DISCONTINUED | OUTPATIENT
Start: 2020-03-31 | End: 2020-04-02 | Stop reason: HOSPADM

## 2020-03-31 RX ORDER — TACROLIMUS 5 MG/1
3 CAPSULE ORAL
COMMUNITY
End: 2021-09-26 | Stop reason: HOSPADM

## 2020-03-31 RX ORDER — PREDNISONE 10 MG/1
10 TABLET ORAL DAILY
Status: DISCONTINUED | OUTPATIENT
Start: 2020-03-31 | End: 2020-04-02 | Stop reason: HOSPADM

## 2020-03-31 RX ORDER — TACROLIMUS 1 MG/1
3 CAPSULE ORAL
Status: DISCONTINUED | OUTPATIENT
Start: 2020-03-31 | End: 2020-04-01

## 2020-03-31 RX ORDER — ALBUTEROL SULFATE 90 UG/1
2 AEROSOL, METERED RESPIRATORY (INHALATION) EVERY 4 HOURS PRN
Status: DISCONTINUED | OUTPATIENT
Start: 2020-03-31 | End: 2020-04-02 | Stop reason: HOSPADM

## 2020-03-31 RX ORDER — PREDNISONE 10 MG/1
10 TABLET ORAL DAILY
COMMUNITY
End: 2021-09-07 | Stop reason: SDUPTHER

## 2020-03-31 RX ADMIN — MINOCYCLINE HYDROCHLORIDE 100 MG: 50 CAPSULE ORAL at 21:57

## 2020-03-31 RX ADMIN — APIXABAN 10 MG: 5 TABLET, FILM COATED ORAL at 18:24

## 2020-03-31 RX ADMIN — LEVALBUTEROL HYDROCHLORIDE 1.25 MG: 1.25 SOLUTION, CONCENTRATE RESPIRATORY (INHALATION) at 21:11

## 2020-03-31 RX ADMIN — OXYCODONE HYDROCHLORIDE 5 MG: 5 TABLET ORAL at 13:13

## 2020-03-31 RX ADMIN — MEROPENEM 1000 MG: 1 INJECTION, POWDER, FOR SOLUTION INTRAVENOUS at 18:46

## 2020-03-31 RX ADMIN — PREDNISONE 10 MG: 10 TABLET ORAL at 14:07

## 2020-03-31 RX ADMIN — TACROLIMUS 3 MG: 1 CAPSULE, GELATIN COATED ORAL at 18:28

## 2020-03-31 RX ADMIN — HYDROMORPHONE HYDROCHLORIDE 0.5 MG: 1 INJECTION, SOLUTION INTRAMUSCULAR; INTRAVENOUS; SUBCUTANEOUS at 18:18

## 2020-03-31 RX ADMIN — DRONABINOL 5 MG: 2.5 CAPSULE ORAL at 18:23

## 2020-03-31 RX ADMIN — HYDROMORPHONE HYDROCHLORIDE 0.5 MG: 1 INJECTION, SOLUTION INTRAMUSCULAR; INTRAVENOUS; SUBCUTANEOUS at 07:53

## 2020-03-31 RX ADMIN — GABAPENTIN 100 MG: 100 CAPSULE ORAL at 18:23

## 2020-03-31 RX ADMIN — TACROLIMUS 2.5 MG: 0.5 CAPSULE ORAL at 10:38

## 2020-03-31 RX ADMIN — OXYCODONE HYDROCHLORIDE 10 MG: 10 TABLET ORAL at 21:55

## 2020-03-31 RX ADMIN — SODIUM CHLORIDE 1000 ML: 0.9 INJECTION, SOLUTION INTRAVENOUS at 11:02

## 2020-03-31 RX ADMIN — INSULIN LISPRO 4 UNITS: 100 INJECTION, SOLUTION INTRAVENOUS; SUBCUTANEOUS at 21:58

## 2020-03-31 RX ADMIN — SODIUM CHLORIDE 1000 ML: 0.9 INJECTION, SOLUTION INTRAVENOUS at 07:51

## 2020-03-31 RX ADMIN — OXYCODONE HYDROCHLORIDE 10 MG: 10 TABLET ORAL at 15:26

## 2020-03-31 RX ADMIN — ISODIUM CHLORIDE 3 ML: 0.03 SOLUTION RESPIRATORY (INHALATION) at 21:11

## 2020-03-31 RX ADMIN — MEROPENEM 1000 MG: 1 INJECTION, POWDER, FOR SOLUTION INTRAVENOUS at 09:20

## 2020-03-31 RX ADMIN — HYDROMORPHONE HYDROCHLORIDE 0.5 MG: 1 INJECTION, SOLUTION INTRAMUSCULAR; INTRAVENOUS; SUBCUTANEOUS at 09:53

## 2020-03-31 RX ADMIN — PANCRELIPASE 48000 UNITS: 24000; 76000; 120000 CAPSULE, DELAYED RELEASE PELLETS ORAL at 18:27

## 2020-03-31 RX ADMIN — ACETAMINOPHEN 650 MG: 325 TABLET ORAL at 18:45

## 2020-03-31 RX ADMIN — ATOVAQUONE 1500 MG: 750 SUSPENSION ORAL at 15:27

## 2020-03-31 RX ADMIN — MINOCYCLINE HYDROCHLORIDE 100 MG: 100 CAPSULE ORAL at 09:20

## 2020-03-31 RX ADMIN — ALPRAZOLAM 1 MG: 0.5 TABLET ORAL at 21:56

## 2020-03-31 RX ADMIN — MAGNESIUM OXIDE TAB 400 MG (241.3 MG ELEMENTAL MG) 400 MG: 400 (241.3 MG) TAB at 18:23

## 2020-03-31 NOTE — ED RE-EVALUATION NOTE
Patient's transplant specialist at [facility]: Dr Lamin Alvarenga  Cell phone # [phone]        Ronny Parker,   03/31/20 208

## 2020-03-31 NOTE — SOCIAL WORK
PT IS A READMISSION  DEVELOPED INCREASING SOB ON O2-4 LPM   PT IS A B/L LUNG TRANSPLANT-DONE AT [facility] [place] [address] FOR TRANSPORT TO THE ED FOR EVALUATION  ALL MEDICATIONS WERE TAKEN AS PRESCRIBED  Pt lives with her  Jayy Antoine in a 2 story house, but her bedroom is on the first floor  There are 4-5 ELVIE  Pt is able to navigate steps and is independent with ADL's  She uses O2, but no DME's to ambulate  She is current with a [facility] agency for PT, but unsure of the name of the agency  She was admitted before it could actually be started  Denies substance abuse  She does have anxiety and depression that is treated with medication  Her PCP is Dr Jon Elder and her lung transplant physician is Dr Sumit Dee  Pt uses [facility] and has no problem with co-pays  Her health care proxy is  Jyay Antoine  Pt does not work, but still drives  CM discussed d/c needs and  Jayy Antoine explains that he was under the impression that pt may be transferred to [address] [place] [address] for evaluation  CM will continue to follow and will assess needs as hospitalization progresses  CM reviewed discharge planning process including the following: identifying help at home, patient preference for discharge planning needs, pharmacy preference, and availability of treatment team to discuss questions or concerns patient and/or family may have regarding understanding medications and recognizing signs and symptoms once discharged  CM also encouraged patient to follow up with all recommended appointments after discharge  Patient advised of importance for patient and family to participate in managing patients medical well being

## 2020-03-31 NOTE — H&P
H&P- Rob Suarezjoseph Tong 1975, 45 y o  female MRN: [id number]    Unit/Bed#: -Alexandra Encounter: [id number]    Primary Care Provider: Shawn Newby MD   Date and time admitted to hospital: 3/31/2020  7:24 AM        * Acute and chronic respiratory failure with hypoxia [facility])  Assessment & Plan  This is a 45years old female with history of bilateral lung transplant, recent history of intubation and mechanical ventilation, she was transferred to [facility] for better management  She was on IV antibiotics and ECMO over there  She was discharged home on March 25th on 4 L oxygen  She was given minocycline for 1 month  Now this morning she was having more short of breath and hypoxia  As per patient her O2 sat was 40 at home  In the ER initially she was placed on 6 L oxygen and now currently back to for L  Saturating well  Chest x-ray showed right-sided infiltrate  Emergency room physician called her transplant center and discussed the case  Recommended to send COVID and monitor here  No need to transfer now  Patient been admitted for further management  Discussed personally with Pulmonary and Infectious Disease Service  Given her prolonged history an immunocompromised status, will start her on meropenem and possibly linezolid  Follow up the cultures  Maintain contact and airborne isolation  Patient denies any travel anywhere outside [facility]   Influenza and RSV negative    Dvt femoral (deep venous thrombosis) ([facility] [address] )  Assessment & Plan  Diagnosed recently  Currently on Eliquis  Continue    Chronic pain disorder  Assessment & Plan  Patient takes oxycodone at home  Continue  Also continue Dilaudid p r n  For breakthrough pain    HCAP (healthcare-associated pneumonia)  Assessment & Plan  Management as above    Pancreatic insufficiency  Assessment & Plan  Continue supplement    Hyperglycemia  Assessment & Plan  Continue sliding scale    Check hemoglobin A1c    Cystic fibrosis (HCC)  Assessment & Plan  Status post lung transplant  Monitor closely    History of lung transplant [facility])  Assessment & Plan  Continue immunosuppression medications including prednisone and tacrolimus        VTE Prophylaxis: Apixaban (Eliquis)  / sequential compression device   Code Status:  Full code  POLST: POLST is not applicable to this patient  Discussion with family:     Anticipated Length of Stay:  Patient will be admitted on an Inpatient basis with an anticipated length of stay of  more than 2 midnights  Justification for Hospital Stay:  Hypoxia, pneumonia    Total Time for Visit, including Counseling / Coordination of Care:Greater than 50% of this total time spent on direct patient counseling and coordination of care  Chief Complaint:   Short of breath, hypoxia    History of Present Illness:    Addis Kim is a 45 y o  female who presents with chest pain right-sided, shortness of breath and hypoxia  Patient has very long complicated history  She had history of lung transplant  Last month she was admitted here, was intubated and after that she was transferred to [facility]  Her hospital stay over there complicated with mechanical ventilation, ECMO, spontaneous pneumothorax  Eventually she was successfully extubated  Bronchoalveolar lavage was going Pseudomonas  She was given aggressive IV antibiotics  Discharged home on minocycline and she was discharged home on 4 L oxygen via nasal cannula  She was discharged home on March 25th  This morning she was complaining about short of breath and right-sided chest pain  Her O2 sat was 40 as per patient  In the emergency room her O2 sat was 83 initially, placed on oxygen at 5 L  Currently on 4 L and saturating well  Chest x-ray shows right-sided infiltrate, worsening  Emergency room physician contacted Transplant Center  No need for transfer at this point of time  Wants her to stay here and rule out COVID    I discussed personally with Infectious Disease and Pulmonary  Patient currently feels comfortable  No chest pain, headache, dizziness, abdominal pain, nausea or vomiting  Afebrile  Denies any contract history or recent travel except 2 fen  Review of Systems:    Review of Systems   Constitutional: Negative for chills and fever  HENT: Negative for ear pain, nosebleeds, sneezing, sore throat, tinnitus and voice change  Eyes: Negative for pain and visual disturbance  Respiratory: Positive for shortness of breath  Negative for cough, chest tightness and wheezing  Cardiovascular: Positive for chest pain  Negative for palpitations and leg swelling  Right-sided   Gastrointestinal: Negative for abdominal distention, abdominal pain, blood in stool, nausea and vomiting  Endocrine: Negative for cold intolerance  Genitourinary: Negative for dysuria, flank pain, frequency, hematuria and urgency  Musculoskeletal: Negative for back pain and joint swelling  Skin: Negative for pallor and rash  Neurological: Negative for dizziness, speech difficulty, light-headedness, numbness and headaches  Psychiatric/Behavioral: Negative for agitation and confusion         Past Medical and Surgical History:     Past Medical History:   Diagnosis Date    Asthma     Chronic pain     Cystic fibrosis ([facility] [address] )     Kidney stone     PONV (postoperative nausea and vomiting)     Transplant recipient        Past Surgical History:   Procedure Laterality Date    APPENDECTOMY      CHOLECYSTECTOMY      FL RETROGRADE PYELOGRAM  1/9/2020    FL RETROGRADE PYELOGRAM  2/4/2020    LUNG SURGERY      MO CYSTO/URETERO W/LITHOTRIPSY &INDWELL STENT INSRT Right 2/4/2020    Procedure: CYSTOSCOPY URETEROSCOPY WITH LITHOTRIPSY HOLMIUM LASER, RETROGRADE PYELOGRAM AND INSERTION STENT URETERAL;  Surgeon: Safia Guardado MD;  Location: [facility];  Service: Urology    MO CYSTOURETHROSCOPY,URETER CATHETER Right 1/9/2020    Procedure: CYSTOSCOPY RETROGRADE PYELOGRAM WITH INSERTION STENT URETERAL;  Surgeon: Ermias Alaniz MD;  Location: [facility];  Service: Urology       Meds/Allergies:    Prior to Admission medications    Medication Sig Start Date End Date Taking?  Authorizing Provider   albuterol (ACCUNEB) 1 25 MG/3ML nebulizer solution Take 1 ampule by nebulization every 4 (four) hours as needed for wheezing   Yes Historical Provider, MD   alendronate (FOSAMAX) 70 mg tablet Take 70 mg by mouth every 7 days   Yes Historical Provider, MD   ALPRAZolam Jimmey Maze) 1 mg tablet Take 1 mg by mouth 2 (two) times a day as needed for anxiety   Yes Historical Provider, MD   apixaban (ELIQUIS) 5 mg Take 10 mg by mouth 2 (two) times a day   Yes Historical Provider, MD   atovaquone (MEPRON) 750 mg/5 mL suspension Take 1,500 mg by mouth daily   Yes Historical Provider, MD   Cannabinoids (THC FREE) 20 MG/ML LIQD Take by mouth   Yes Historical Provider, MD   colistimethate (COLY-MYCIN M) inhalation solution Take 150 mg by nebulization every 12 (twelve) hours   Yes Historical Provider, MD   dexlansoprazole (DEXILANT) 60 MG capsule Take 60 mg by mouth daily   Yes Historical Provider, MD   dronabinol (MARINOL) 5 MG capsule Take 5 mg by mouth 2 (two) times a day before meals   Yes Historical Provider, MD   ferrous sulfate 325 (65 Fe) mg tablet Take 325 mg by mouth daily with breakfast   Yes Historical Provider, MD   gabapentin (NEURONTIN) 100 mg capsule Take 100 mg by mouth 2 (two) times a day as needed   Yes Historical Provider, MD   insulin aspart (NovoLOG) 100 units/mL injection Inject under the skin 3 (three) times a day before meals   Yes Historical Provider, MD   magnesium oxide (MAG-OX) 400 mg Take 400 mg by mouth 2 (two) times a day   Yes Historical Provider, MD   minocycline (DYNACIN) 100 MG tablet Take 100 mg by mouth 2 (two) times a day   Yes Historical Provider, MD   Multiple Vitamin (MULTIVITAMIN) tablet Take 1 tablet by mouth daily   Yes Historical Provider, MD   oxyCODONE (ROXICODONE) 5 mg immediate release tablet Take 10 mg by mouth every 4 (four) hours as needed for moderate pain   Yes Historical Provider, MD   pancrelipase, Lip-Prot-Amyl, (CREON) 24,000 units Take 48,000 Units by mouth 3 (three) times a day with meals   Yes Historical Provider, MD   pancrelipase, Lip-Prot-Amyl, (CREON) 24,000 units Take 24,000 units of lipase by mouth 3 (three) times a day with meals   Yes Historical Provider, MD   polyethylene glycol (MIRALAX) 17 g packet Take 17 g by mouth 2 (two) times a day as needed   Yes Historical Provider, MD   predniSONE 10 mg tablet Take 10 mg by mouth daily   Yes Historical Provider, MD   tacrolimus (PROGRAF) 5 mg capsule Take 2 5 mg by mouth daily with breakfast   Yes Historical Provider, MD   tacrolimus (PROGRAF) 5 mg capsule Take 3 mg by mouth daily with dinner   Yes Historical Provider, MD     I have reviewed home medications with patient personally  Allergies:    Allergies   Allergen Reactions    Vancomycin Angioedema and Hives    Ceftazidime Headache     Severe headaches after desensitization       Social History:     Marital Status: /Civil Union   Occupation:   Patient Pre-hospital Living Situation:  Patient Pre-hospital Level of Mobility:  Ambulatory  Patient Pre-hospital Diet Restrictions:  None  Substance Use History:   Social History     Substance and Sexual Activity   Alcohol Use Yes    Frequency: 2-4 times a month    Drinks per session: 1 or 2    Binge frequency: Never    Comment: social     Social History     Tobacco Use   Smoking Status Never Smoker   Smokeless Tobacco Never Used     Social History     Substance and Sexual Activity   Drug Use Yes    Frequency: 2 0 times per week    Types: Marijuana    Comment: medical edible marijuana prescribed       Family History:    Family History   Family history unknown: Yes       Physical Exam:     Vitals:   Blood Pressure: 98/64 (03/31/20 1343)  Pulse: 90 (03/31/20 1343)  Temperature: 99 9 °F (37 7 °C) (03/31/20 1343)  Temp Source: Oral (03/31/20 0727)  Respirations: 17 (03/31/20 1343)  Height: 5' 2 5" (158 8 cm) (03/31/20 0727)  Weight - Scale: 54 1 kg (119 lb 4 3 oz) (03/31/20 0727)  SpO2: 98 % (03/31/20 1421)    Physical Exam     Limited due to COVID rule out diagnosis    General- Awake, alert and oriented x 3, looks comfortable  HEENT- Normocephalic, atraumatic  Neck- Supple, No carotid bruit  CVS- Normal S1/ S2  Respiratory system- B/L decreased air entry  Abdomen- non distended  Skin- No new bruise or rash  Musculoskeletal- No gross deformity  Psych- No acute psychosis  CNS- moving all extremities, no focal neuro deficit noted    Additional Data:     Lab Results: I have personally reviewed pertinent reports  Results from last 7 days   Lab Units 03/31/20  0751   WBC Thousand/uL 9 45   HEMOGLOBIN g/dL 7 7*   HEMATOCRIT % 26 4*   PLATELETS Thousands/uL 184   NEUTROS PCT % 80*   LYMPHS PCT % 16   MONOS PCT % 2*   EOS PCT % 1     Results from last 7 days   Lab Units 03/31/20  0751   SODIUM mmol/L 141   POTASSIUM mmol/L 3 6   CHLORIDE mmol/L 106   CO2 mmol/L 29   BUN mg/dL 19   CREATININE mg/dL 0 91   ANION GAP mmol/L 6   CALCIUM mg/dL 8 5   ALBUMIN g/dL 2 3*   TOTAL BILIRUBIN mg/dL 0 30   ALK PHOS U/L 103   ALT U/L 49   AST U/L 32   GLUCOSE RANDOM mg/dL 113     Results from last 7 days   Lab Units 03/31/20  0751   INR  1 31*             Results from last 7 days   Lab Units 03/31/20  1415 03/31/20  0949 03/31/20  0751   LACTIC ACID mmol/L 1 3 2 3* 2 1*       Imaging: I have personally reviewed pertinent reports  XR chest 1 view portable   Final Result by Denzel Mccarthy MD (03/31 9650)      Double lung transplant with worsening consolidation in the right lung, likely pneumonia  Chronic volume loss in the right lung with mediastinal shift to the right  The study was marked in [facility] for immediate notification        Workstation performed: [id number]             EKG, Pathology, and Other Studies Reviewed on Admission:   · EKG:     [facility] Reviewed: Yes     ** Please Note: This note has been constructed using a voice recognition system  **      Pregnancy test negative  EKG showed sinus rhythm  Patient is very high risk for mortality and morbidity due to above-mentioned conditions

## 2020-03-31 NOTE — ED PROVIDER NOTES
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, cystic fibrosis, CF related diabetes, pancreatic insufficiency, GERD, DVT in 2017 status post 3 months anticoagulation, chronic immunosuppression on prednisone and tacrolimus, and chronic back pain managed on medical marijuana, presents to the emergency department by ambulance for acute dyspnea, worsening right-sided chest pain and hypoxia  Patient reports waking up today feeling more short of breath than normal and according to her home O2 monitor she was satting 40% on 4 L nasal cannula  Patient also experiencing heart pain in her right chest   Patient has significant medical history of recently being admitted for hypoxic respiratory failure on 03/06 requiring intubation  She was initially admitted to [address] [place] [address] however transferred quickly to [address] [place] [address] for continuity of care and to be evaluated by patient's transplant team   She was being treated for bilateral pneumonia and she also states that she suffered from a spontaneous pneumothorax that was treated conservatively  She was discharged home on antibiotics on Wednesday 3/25 to avoid prolonged hospitalization in the setting of COVID-19 pandemic and the risk of requiring the virus while in the hospital   She was discharged home with home oxygen and has been on 4 L since discharge  Prior to her recent hospitalization she was not on home oxygen  She states she was feeling okay upon discharge Wednesday up until this morning in which she started having acute dyspnea, dry hacking cough and worsening chest pain    She denies any fever or shaking chills, headache, dizziness or near syncope, other URI symptoms, hemoptysis, abdominal pain, nausea, vomiting, diarrhea, constipation, urinary symptoms, skin rash or color change, extremity swelling or pain, extremity weakness or paresthesia or other focal neurologic deficits  Of note, patient did have a CTA of the chest on 03/06 which was negative for PE  Patient lives at home with her  and daughter and she denies any sick contacts at home  She has not traveled outside of [place] recently  History provided by:  Patient and medical records   used: No        Prior to Admission Medications   Prescriptions Last Dose Informant Patient Reported? Taking?    ALPRAZolam (XANAX) 1 mg tablet   Yes Yes   Sig: Take 1 mg by mouth 2 (two) times a day as needed for anxiety   Cannabinoids (THC FREE) 20 MG/ML LIQD   Yes Yes   Sig: Take by mouth   Multiple Vitamin (MULTIVITAMIN) tablet   Yes Yes   Sig: Take 1 tablet by mouth daily   albuterol (ACCUNEB) 1 25 MG/3ML nebulizer solution  Self Yes Yes   Sig: Take 1 ampule by nebulization every 4 (four) hours as needed for wheezing   alendronate (FOSAMAX) 70 mg tablet   Yes Yes   Sig: Take 70 mg by mouth every 7 days   apixaban (ELIQUIS) 5 mg   Yes Yes   Sig: Take 10 mg by mouth 2 (two) times a day   atovaquone (MEPRON) 750 mg/5 mL suspension   Yes Yes   Sig: Take 1,500 mg by mouth daily   colistimethate (COLY-MYCIN M) inhalation solution   Yes Yes   Sig: Take 150 mg by nebulization every 12 (twelve) hours   dexlansoprazole (DEXILANT) 60 MG capsule   Yes Yes   Sig: Take 60 mg by mouth daily   dronabinol (MARINOL) 5 MG capsule   Yes Yes   Sig: Take 5 mg by mouth 2 (two) times a day before meals   ferrous sulfate 325 (65 Fe) mg tablet   Yes Yes   Sig: Take 325 mg by mouth daily with breakfast   gabapentin (NEURONTIN) 100 mg capsule   Yes Yes   Sig: Take 100 mg by mouth 2 (two) times a day as needed   insulin aspart (NovoLOG) 100 units/mL injection   Yes Yes   Sig: Inject under the skin 3 (three) times a day before meals   magnesium oxide (MAG-OX) 400 mg   Yes Yes   Sig: Take 400 mg by mouth 2 (two) times a day   minocycline (DYNACIN) 100 MG tablet   Yes Yes   Sig: Take 100 mg by mouth 2 (two) times a day   oxyCODONE (ROXICODONE) 5 mg immediate release tablet   Yes Yes   Sig: Take 10 mg by mouth every 4 (four) hours as needed for moderate pain   pancrelipase, Lip-Prot-Amyl, (CREON) 24,000 units   Yes Yes   Sig: Take 48,000 Units by mouth 3 (three) times a day with meals   pancrelipase, Lip-Prot-Amyl, (CREON) 24,000 units   Yes Yes   Sig: Take 24,000 units of lipase by mouth 3 (three) times a day with meals   polyethylene glycol (MIRALAX) 17 g packet   Yes Yes   Sig: Take 17 g by mouth 2 (two) times a day as needed   predniSONE 10 mg tablet   Yes Yes   Sig: Take 10 mg by mouth daily   tacrolimus (PROGRAF) 5 mg capsule   Yes Yes   Sig: Take 2 5 mg by mouth daily with breakfast   tacrolimus (PROGRAF) 5 mg capsule   Yes Yes   Sig: Take 3 mg by mouth daily with dinner      Facility-Administered Medications: None       Past Medical History:   Diagnosis Date    Asthma     Chronic pain     Cystic fibrosis (HCC)     Kidney stone     PONV (postoperative nausea and vomiting)     Transplant recipient        Past Surgical History:   Procedure Laterality Date    APPENDECTOMY      CHOLECYSTECTOMY      FL RETROGRADE PYELOGRAM  1/9/2020    FL RETROGRADE PYELOGRAM  2/4/2020    LUNG SURGERY      RI CYSTO/URETERO W/LITHOTRIPSY &INDWELL STENT INSRT Right 2/4/2020    Procedure: CYSTOSCOPY URETEROSCOPY WITH LITHOTRIPSY HOLMIUM LASER, RETROGRADE PYELOGRAM AND INSERTION STENT URETERAL;  Surgeon: Nestor Pennington MD;  Location: [facility];  Service: Urology    RI CYSTOURETHROSCOPY,URETER CATHETER Right 1/9/2020    Procedure: CYSTOSCOPY RETROGRADE PYELOGRAM WITH INSERTION STENT URETERAL;  Surgeon: Nestor Pennington MD;  [facility]: [facility];  Service: Urology       Family History   Family history unknown: Yes     I have reviewed and agree with the history as documented  E-Cigarette/Vaping    E-Cigarette Use Never User      E-Cigarette/Vaping Substances     Social History     Tobacco Use    Smoking status: Never Smoker    Smokeless tobacco: Never Used   Substance Use Topics    Alcohol use: Yes     Frequency: 2-4 times a month     Drinks per session: 1 or 2     Binge frequency: Never     Comment: social    Drug use: Yes     Frequency: 2 0 times per week     Types: Marijuana     Comment: medical edible marijuana prescribed       Review of Systems   Constitutional: Negative for chills and fever  HENT: Negative for congestion, ear pain, rhinorrhea and sore throat  Respiratory: Positive for cough, chest tightness and shortness of breath  Negative for wheezing  Cardiovascular: Positive for chest pain  Negative for palpitations and leg swelling  Gastrointestinal: Negative for abdominal pain, blood in stool, constipation, diarrhea, nausea and vomiting  Genitourinary: Negative for dysuria, flank pain, frequency and hematuria  Musculoskeletal: Negative for back pain, neck pain and neck stiffness  Skin: Negative for color change, pallor and rash  Allergic/Immunologic: Positive for immunocompromised state  Neurological: Negative for dizziness, syncope, weakness, light-headedness, numbness and headaches  Hematological: Negative for adenopathy  Psychiatric/Behavioral: Negative for confusion and decreased concentration  All other systems reviewed and are negative  Physical Exam  Physical Exam   Constitutional: She is oriented to person, place, and time  She appears well-developed and well-nourished  No distress  HENT:   Head: Normocephalic and atraumatic  Right Ear: External ear normal    Left Ear: External ear normal    Nose: Nose normal    Oropharyngeal exam deferred at this time given potential risk of COVID-19    Patient speaking and handling oral secretions without difficulty and has no throat or mouth complaints  Eyes: Pupils are equal, round, and reactive to light  Conjunctivae and EOM are normal    Neck: Normal range of motion  Neck supple  No JVD present  Tracheostomy scar noted  Cardiovascular: Regular rhythm, normal heart sounds and intact distal pulses  Exam reveals no gallop and no friction rub  No murmur heard  Mild tachycardia  Pulmonary/Chest: Effort normal and breath sounds normal  No respiratory distress  She has no wheezes  She has no rales  She exhibits no tenderness  Diminished breath sounds right upper and lower base but they are present  Coarse breath sounds left lung and right lung base  Patient satting 100% on 4 L currently  Patient able to speak in full sentences  Abdominal: Soft  Bowel sounds are normal  She exhibits no distension  There is no tenderness  There is no rebound and no guarding  Musculoskeletal: Normal range of motion  She exhibits no edema or tenderness  Neurological: She is alert and oriented to person, place, and time  No gross motor or sensory deficits  Skin: Skin is warm and dry  No rash noted  She is not diaphoretic  No erythema  No pallor  Psychiatric: She has a normal mood and affect  Her behavior is normal    Nursing note and vitals reviewed        Vital Signs  ED Triage Vitals [03/31/20 0727]   Temperature Pulse Respirations Blood Pressure SpO2   98 2 °F (36 8 °C) 102 19 110/62 (!) 83 %      Temp Source Heart Rate Source Patient Position - Orthostatic VS BP Location FiO2 (%)   Oral Monitor Sitting Right arm --      Pain Score       7         Vitals:    03/31/20 0731 03/31/20 0830 03/31/20 0930 03/31/20 1000   BP:  112/59 102/57 92/53   BP Location:  Right arm Right arm Right arm   Pulse: 96 95 92 95   Resp:  20 14 22   Temp:       TempSrc:       SpO2: 99% 100% 100% 100%   Weight:       Height:           Visual Acuity      ED Medications  Medications   sodium chloride 0 9 % bolus 1,000 mL (has no administration in time range)   sodium chloride 0 9 % bolus 1,000 mL (1,000 mL Intravenous New Bag 3/31/20 0751)   HYDROmorphone (DILAUDID) injection 0 5 mg (0 5 mg Intravenous Given 3/31/20 0753)   meropenem (MERREM) 1,000 mg in sodium chloride 0 9 % 100 mL IVPB (0 mg Intravenous Stopped 3/31/20 1020)   minocycline (MINOCIN) capsule 100 mg (100 mg Oral Given 3/31/20 0920)   HYDROmorphone (DILAUDID) injection 0 5 mg (0 5 mg Intravenous Given 3/31/20 0953)   tacrolimus (PROGRAF) capsule 2 5 mg (2 5 mg Oral Given 3/31/20 1038)       Diagnostic Studies  Results Reviewed     Procedure Component Value Units Date/Time    Lactic acid, plasma [[id number]]  (Abnormal) Collected:  03/31/20 0949    Lab Status:  Final result Specimen:  Blood from Line, Venous Updated:  03/31/20 1035     LACTIC ACID 2 3 mmol/L     Narrative:       Result may be elevated if tourniquet was used during collection  Urine Microscopic [[id number]]  (Abnormal) Collected:  03/31/20 0919    Lab Status:  Final result Specimen:  Urine, Other Updated:  03/31/20 0959     RBC, UA Innumerable /hpf      WBC, UA 4-10 /hpf      Epithelial Cells Occasional /hpf      Bacteria, UA None Seen /hpf      OTHER OBSERVATIONS Yeast Cells Present    UA (URINE) with reflex to Scope [[id number]]  (Abnormal) Collected:  03/31/20 0919    Lab Status:  Final result Specimen:  Urine, Other Updated:  03/31/20 0940     Color, UA Brown     Clarity, UA Cloudy     Specific [place], UA 1 020     pH, UA 6 0     Leukocytes, UA Elevated glucose may cause decreased leukocyte values   See urine microscopic for [facility] result/     Nitrite, UA Negative     Protein, UA Negative mg/dl      Glucose, UA >=1000 (1%) mg/dl      Ketones, UA Negative mg/dl      Urobilinogen, UA 0 2 E U /dl      Bilirubin, UA Negative     Blood, UA Large    POCT pregnancy, urine [[id number]]  (Normal) Resulted:  03/31/20 0925    Lab Status:  Final result Updated:  03/31/20 0926     EXT PREG TEST UR (Ref: Negative) negative     Control valid    Blood culture #2 [[id number]] Collected:  03/31/20 0912    Lab Status: In process Specimen:  Blood from Arm, Left Updated:  03/31/20 0925    Blood culture #1 [[id number]] Collected:  03/31/20 0915    Lab Status: In process Specimen:  Blood from Arm, Left Updated:  03/31/20 0925    Influenza A/B and RSV PCR [[id number]]  (Normal) Collected:  03/31/20 0801    Lab Status:  Final result Specimen:  Nasopharyngeal Swab Updated:  03/31/20 0843     INFLUENZA A PCR None Detected     INFLUENZA B PCR None Detected     RSV PCR None Detected    Protime-INR [[id number]]  (Abnormal) Collected:  03/31/20 0751    Lab Status:  Final result Specimen:  Blood from Line, Venous Updated:  03/31/20 0825     Protime 16 4 seconds      INR 1 31    APTT [[id number]]  (Normal) Collected:  03/31/20 0751    Lab Status:  Final result Specimen:  Blood from Line, Venous Updated:  03/31/20 0825     PTT 30 seconds     Lactic acid, plasma [[id number]]  (Abnormal) Collected:  03/31/20 0751    Lab Status:  Final result Specimen:  Blood from Line, Venous Updated:  03/31/20 0820     LACTIC ACID 2 1 mmol/L     Narrative:       Result may be elevated if tourniquet was used during collection      Troponin I [[id number]]  (Normal) Collected:  03/31/20 0751    Lab Status:  Final result Specimen:  Blood from Line, Venous Updated:  03/31/20 0818     Troponin I <0 02 ng/mL     Basic metabolic panel [[id number]] Collected:  03/31/20 0751    Lab Status:  Final result Specimen:  Blood from Line, Venous Updated:  03/31/20 0815     Sodium 141 mmol/L      Potassium 3 6 mmol/L      Chloride 106 mmol/L      CO2 29 mmol/L      ANION GAP 6 mmol/L      BUN 19 mg/dL      Creatinine 0 91 mg/dL      Glucose 113 mg/dL      Calcium 8 5 mg/dL      eGFR 78 ml/min/1 73sq m     Narrative:       [place] guidelines for Chronic Kidney Disease (CKD):     Stage 1 with normal or high GFR (GFR > 90 mL/min/1 73 square meters)    Stage 2 Mild CKD (GFR = 60-89 mL/min/1 73 square meters)    Stage 3A Moderate CKD (GFR = 45-59 mL/min/1 73 square meters)    Stage 3B Moderate CKD (GFR = 30-44 mL/min/1 73 square meters)    Stage 4 Severe CKD (GFR = 15-29 mL/min/1 73 square meters)    Stage 5 End Stage CKD (GFR <15 mL/min/1 73 square meters)  Note: GFR calculation is accurate only with a steady state creatinine    Hepatic function panel [[id number]]  (Abnormal) Collected:  03/31/20 0751    Lab Status:  Final result Specimen:  Blood from Line, Venous Updated:  03/31/20 0815     Total Bilirubin 0 30 mg/dL      Bilirubin, Direct 0 13 mg/dL      Alkaline Phosphatase 103 U/L      AST 32 U/L      ALT 49 U/L      Total Protein 6 0 g/dL      Albumin 2 3 g/dL     Magnesium [[id number]]  (Normal) Collected:  03/31/20 0751    Lab Status:  Final result Specimen:  Blood from Line, Venous Updated:  03/31/20 0815     Magnesium 1 6 mg/dL     Coronavirus 2019-nCoV [facility] [[id number]] Collected:  03/31/20 0801    Lab Status:   In process Specimen:  Nasopharyngeal Swab Updated:  03/31/20 0804    CBC and differential [[id number]]  (Abnormal) Collected:  03/31/20 0751    Lab Status:  Final result Specimen:  Blood from Line, Venous Updated:  03/31/20 0759     WBC 9 45 Thousand/uL      RBC 2 82 Million/uL      Hemoglobin 7 7 g/dL      Hematocrit 26 4 %      MCV 94 fL      MCH 27 3 pg      MCHC 29 2 g/dL      RDW 15 6 %      MPV 9 5 fL      Platelets 222 Thousands/uL      nRBC 0 /100 WBCs      Neutrophils Relative 80 %      Immat GRANS % 1 %      Lymphocytes Relative 16 %      Monocytes Relative 2 %      Eosinophils Relative 1 %      Basophils Relative 0 %      Neutrophils Absolute 7 49 Thousands/µL      Immature Grans Absolute 0 07 Thousand/uL      Lymphocytes Absolute 1 53 Thousands/µL      Monocytes Absolute 0 23 Thousand/µL      Eosinophils Absolute 0 11 Thousand/µL      Basophils Absolute 0 02 Thousands/µL                  XR chest 1 view portable   Final Result by Lalo Baird MD (03/31 4191)      Double lung transplant with worsening consolidation in the right lung, likely pneumonia  Chronic volume loss in the right lung with mediastinal shift to the right  The study was marked in [facility] for immediate notification  Workstation performed: [id number]                    Procedures  ECG 12 Lead Documentation Only  Date/Time: 3/31/2020 8:06 AM  Performed by: Neetu Dupree DO  Authorized by: Neetu Dupree DO     ECG reviewed by me, the ED Provider: yes    Patient location:  ED  Previous ECG:     Previous ECG:  Compared to current    Comparison ECG info:  Nonspecific T-wave abnormality now evident in the anterior leads compared to prior EKG on 3/6/2020  Rate:     ECG rate:  97    ECG rate assessment: normal    Rhythm:     Rhythm: sinus rhythm    Ectopy:     Ectopy: none    QRS:     QRS axis:  Normal    QRS intervals:  Normal  Conduction:     Conduction: normal    ST segments:     ST segments:  Normal  T waves:     T waves: non-specific               ED Course  ED Course as of Mar 31 1052   Tue Mar 31, 2020   0835 Updated patient that chest x-ray shows evidence of worsening pneumonia  Patient had been on meropenem and linezolid upon initial admission earlier this month  Will consult with patient's transplant team and see if they want her transferred to [facility] and will get antibiotic recommendations at that time  Transfer order placed  Waiting on call-back from PACS       5708 Patient meeting sepsis criteria given elevated lactic acid so will start meropenem  [phone] with attending physician Dr Elizabeth King (cell #[phone]) at [facility], transplant specialist, who will accept patient but has to speak with Infectious Disease and the triage system prior to accepting due to recent [place] pandemic  He recommended starting meropenem and minocycline        0854 Hgb decreased from Hgb on 03/06 (13) however according to care everywhere, most recent labs at [facility] show similar Hgb between 7 7-8 0  She denies GI bleeding (melena, BRBPR) or any other source of bleeding  Hemoglobin(!): 7 7   0904 Dr Luisa Mariano called back after speaking with [facility] hospitalist and ID who suggested patient stay at [facility] until COVID test results  They have low suspicion for COVID however would like to await results so they can properly isolate her given her immunosuppression  6447 Discussed case with Dr Sanjuana Krabbe with [facility] who accepted patient  HEART Risk Score      Most Recent Value   Heart Score Risk Calculator   History  0 Filed at: 03/31/2020 0915   ECG  0 Filed at: 03/31/2020 0915   Age  0 Filed at: 03/31/2020 0915   Risk Factors  1 Filed at: 03/31/2020 0915   Troponin  0 Filed at: 03/31/2020 0915   HEART Score  1 Filed at: 03/31/2020 0915                  Initial Sepsis Screening     Row Name 03/31/20 0847                Is the patient's history suggestive of a new or worsening infection? (!) Yes (Proceed)  -MA        Suspected source of infection  pneumonia  -MA        Are two or more of the following signs & symptoms of infection both present and new to the patient? (!) Yes (Proceed)  -MA        Indicate SIRS criteria  Tachycardia > 90 bpm;Tachypnea > 20 resp per min  -MA        If the answer is yes to both questions, suspicion of sepsis is present          If severe sepsis is present AND tissue hypoperfusion perists in the hour after fluid resuscitation or lactate > 4, the patient meets criteria for SEPTIC SHOCK          Are any of the following organ dysfunction criteria present within 6 hours of suspected infection and SIRS criteria that are NOT considered to be chronic conditions?   (!) Yes  -MA        Organ dysfunction  Lactate > 2 0 mmol/L  -MA        Date of presentation of severe sepsis  03/31/20  -MA        Time of presentation of severe sepsis  0847  -MA        Tissue hypoperfusion persists in the hour after crystalloid fluid administration, evidenced, by either:          Was hypotension present within one hour of the conclusion of crystalloid fluid administration? No  -MA        Date of presentation of septic shock          Time of presentation of septic shock            User Key  (r) = Recorded By, (t) = Taken By, (c) = Cosigned By    Initials Name Provider Type    MA Gillian DO [facility] Physician                  MDM  Number of Diagnoses or Management Options  Acute dyspnea:   Pneumonia involving right lung:   Diagnosis management comments: 44-year-old female with significant medical history of cystic fibrosis, bilateral lung transplant, recent hospitalization for respiratory failure requiring intubation, bilateral pneumonia and pneumothorax, presents to the emergency department for acute chest pain, cough, shortness of breath and hypoxia  Differential includes worsening pneumonia, worsening pneumothorax, new COVID-19 infection or other viral etiology  Low suspicion for PE but also considered  Patient did have negative PE study less than 1 month ago  Will do cardiac and septic workup, portable chest x-ray and provide IV fluids, nasal cannula oxygen to maintain her O2 sat and Dilaudid for pain control  Will swab for influenza and COVID-19  Will likely consult with patient's transplant team prior to disposition decision           Amount and/or Complexity of Data Reviewed  Clinical lab tests: ordered and reviewed  Tests in the radiology section of CPT®: ordered and reviewed  Tests in the medicine section of CPT®: ordered and reviewed  Review and summarize past medical records: yes  Discuss the patient with other providers: yes  Independent visualization of images, tracings, or specimens: yes          Disposition  Final diagnoses:   Acute dyspnea   Pneumonia involving right lung - Worsening     Time reflects when diagnosis was documented in both MDM as applicable and the Disposition within this note     Time User Action Codes Description Comment    3/31/2020  8:56 AM Laura Palin E Add [R06 00] Acute dyspnea     3/31/2020  8:56 AM Laura Palin E Add [J18 9] Pneumonia involving right lung     3/31/2020  8:56 AM Laura Palin E Modify [J18 9] Pneumonia involving right lung Worsening      ED Disposition     ED Disposition Condition Date/Time Comment    Admit Stable Tue Mar 31, 2020  9:14 AM Case was discussed with KORY and the patient's admission status was agreed to be Admission Status: inpatient status to the service of Dr Cuauhtemoc Muller   Follow-up Information    None         Patient's Medications   Discharge Prescriptions    No medications on file     No discharge procedures on file      PDMP Review     None          ED Provider  Electronically Signed by           Lashanda Rodney, DO  03/31/20 9454       Lashanda Rodney, DO  03/31/20 7699       Lashanda Rodney, DO  03/31/20 7728       Lashanda Rodney, DO  03/31/20 1036       Lashanda Rodney, DO  03/31/20 9262

## 2020-03-31 NOTE — ED NOTES
1  CC-increased SOB since this morning  Pt reports oxygen sat in the 40's at home and cough  Hx bilateral lung transplant in 2013 and another new left lung in 2017    2  Orientation status-A&OX4    3  Abnormal labs/ abnormal focused assessment/ abnormal vitals-crackles in right lung  Pt c/o feeling SOB and chest pain  Oxygen saturation 100% on 4L nasal cannula (pt was recently sent home from [facility] on home oxygen)  Elevated lactic acid  Low H&H      4  Medications/drips-second liter of 0 9% normal saline still running    5  Last time narcotics given-0953 dilaudid IV 0 5mg    6  IV lines/drains/etc-port on right chest accessed  7  Isolation status-r/t COVID    8  Skin-intact    9  Ambulation-weak (assistx1)    10  ED nurse's phone number-[phone]  11   Admission related to traumatic injury-no           Radha Tavera RN  03/31/20 1120

## 2020-03-31 NOTE — SEPSIS NOTE
Sepsis Note   Tyson Fortune 45 y o  female MRN: [id number]  Unit/Bed#: ED 23 Encounter: [id number]      qSOFA     Row Name 03/31/20 0118                Altered mental status GCS < 15          Respiratory Rate > / =69  0        Systolic BP < / =062  0        Q Sofa Score  0            Initial Sepsis Screening     Row Name 03/31/20 0847                Is the patient's history suggestive of a new or worsening infection? (!) Yes (Proceed)  -MA        Suspected source of infection  pneumonia  -MA        Are two or more of the following signs & symptoms of infection both present and new to the patient? (!) Yes (Proceed)  -MA        Indicate SIRS criteria  Tachycardia > 90 bpm;Tachypnea > 20 resp per min  -MA        If the answer is yes to both questions, suspicion of sepsis is present          If severe sepsis is present AND tissue hypoperfusion perists in the hour after fluid resuscitation or lactate > 4, the patient meets criteria for SEPTIC SHOCK          Are any of the following organ dysfunction criteria present within 6 hours of suspected infection and SIRS criteria that are NOT considered to be chronic conditions? (!) Yes  -MA        Organ dysfunction  Lactate > 2 0 mmol/L  -MA        Date of presentation of severe sepsis  03/31/20  -MA        Time of presentation of severe sepsis  0847  -MA        Tissue hypoperfusion persists in the hour after crystalloid fluid administration, evidenced, by either:          Was hypotension present within one hour of the conclusion of crystalloid fluid administration?   No  -MA        Date of presentation of septic shock          Time of presentation of septic shock            User Key  (r) = Recorded By, (t) = Taken By, (c) = Cosigned By    Initials Name Provider Type    [address], DO Physician

## 2020-03-31 NOTE — ASSESSMENT & PLAN NOTE
This is a 45years old female with history of bilateral lung transplant, recent history of intubation and mechanical ventilation, she was transferred to [facility] for better management  She was on IV antibiotics and ECMO over there  She was discharged home on March 25th on 4 L oxygen  She was given minocycline for 1 month  Now this morning she was having more short of breath and hypoxia  As per patient her O2 sat was 40 at home  In the ER initially she was placed on 6 L oxygen and now currently back to for L  Saturating well  Chest x-ray showed right-sided infiltrate  Emergency room physician called her transplant center and discussed the case  Recommended to send COVID and monitor here  No need to transfer now  Patient been admitted for further management  Discussed personally with Pulmonary and Infectious Disease Service  Given her prolonged history an immunocompromised status, will start her on meropenem and possibly linezolid  Follow up the cultures  Maintain contact and airborne isolation    Patient denies any travel anywhere outside [facility]   Influenza and RSV negative

## 2020-03-31 NOTE — PLAN OF CARE
Problem: PAIN - ADULT  Goal: Verbalizes/displays adequate comfort level or baseline comfort level  Description  Interventions:  - Encourage patient to monitor pain and request assistance  - Assess pain using appropriate pain scale  - Administer analgesics based on type and severity of pain and evaluate response  - Implement non-pharmacological measures as appropriate and evaluate response  - Consider cultural and social influences on pain and pain management  - Notify physician/advanced practitioner if interventions unsuccessful or patient reports new pain  Outcome: Progressing     Problem: INFECTION - ADULT  Goal: Absence or prevention of progression during hospitalization  Description  INTERVENTIONS:  - Assess and monitor for signs and symptoms of infection  - Monitor lab/diagnostic results  - Monitor all insertion sites, i e  indwelling lines, tubes, and drains  - Monitor endotracheal if appropriate and nasal secretions for changes in amount and color  - [place] appropriate cooling/warming therapies per order  - Administer medications as ordered  - Instruct and encourage patient and family to use good hand hygiene technique  - Identify and instruct in appropriate isolation precautions for identified infection/condition  Outcome: Progressing     Problem: SAFETY ADULT  Goal: Patient will remain free of falls  Description  INTERVENTIONS:  - Assess patient frequently for physical needs  -  Identify cognitive and physical deficits and behaviors that affect risk of falls    -  [place] fall precautions as indicated by assessment   - Educate patient/family on patient safety including physical limitations  - Instruct patient to call for assistance with activity based on assessment  - Modify environment to reduce risk of injury  - Consider OT/PT consult to assist with strengthening/mobility  Outcome: Progressing  Goal: Maintain or return to baseline ADL function  Description  INTERVENTIONS:  -  Assess patient's ability to carry out ADLs; assess patient's baseline for ADL function and identify physical deficits which impact ability to perform ADLs (bathing, care of mouth/teeth, toileting, grooming, dressing, etc )  - Assess/evaluate cause of self-care deficits   - Assess range of motion  - Assess patient's mobility; develop plan if impaired  - Assess patient's need for assistive devices and provide as appropriate  - Encourage maximum independence but intervene and supervise when necessary  - Involve family in performance of ADLs  - Assess for home care needs following discharge   - Consider OT consult to assist with ADL evaluation and planning for discharge  - Provide patient education as appropriate  Outcome: Progressing  Goal: Maintain or return mobility status to optimal level  Description  INTERVENTIONS:  - Assess patient's baseline mobility status (ambulation, transfers, stairs, etc )    - Identify cognitive and physical deficits and behaviors that affect mobility  - Identify mobility aids required to assist with transfers and/or ambulation (gait belt, sit-to-stand, lift, walker, cane, etc )  - [place] fall precautions as indicated by assessment  - Record patient progress and toleration of activity level on Mobility SBAR; progress patient to next Phase/Stage  - Instruct patient to call for assistance with activity based on assessment  - Consider rehabilitation consult to assist with strengthening/weightbearing, etc   Outcome: Progressing     Problem: DISCHARGE PLANNING  Goal: Discharge to home or other facility with appropriate resources  Description  INTERVENTIONS:  - Identify barriers to discharge w/patient and caregiver  - Arrange for needed discharge resources and transportation as appropriate  - Identify discharge learning needs (meds, wound care, etc )  - Arrange for interpretive services to assist at discharge as needed  - Refer to Case Management Department for coordinating discharge planning if the patient needs post-hospital services based on physician/advanced practitioner order or complex needs related to functional status, cognitive ability, or social support system  Outcome: Progressing     Problem: Knowledge Deficit  Goal: Patient/family/caregiver demonstrates understanding of disease process, treatment plan, medications, and discharge instructions  Description  Complete learning assessment and assess knowledge base    Interventions:  - Provide teaching at level of understanding  - Provide teaching via preferred learning methods  Outcome: Progressing

## 2020-03-31 NOTE — RESPIRATORY THERAPY NOTE
RT Protocol Note  Joyce Fortune 45 y o  female MRN: [id number]  Unit/Bed#: -01 Encounter: [id number]    Assessment    Active Problems:    * No active hospital problems   *      Home Pulmonary Medications:  Albuterol 3-4 times per day  Home Devices/Therapy: Vest    Past Medical History:   Diagnosis Date    Asthma     Chronic pain     Cystic fibrosis ([address] )     Kidney stone     PONV (postoperative nausea and vomiting)     Transplant recipient      Social History     Socioeconomic History    Marital status: /Civil Union     Spouse name: None    Number of children: None    Years of education: None    Highest education level: None   Occupational History    None   Social Needs    Financial resource strain: None    Food insecurity:     Worry: None     Inability: None    Transportation needs:     Medical: None     Non-medical: None   Tobacco Use    Smoking status: Never Smoker    Smokeless tobacco: Never Used   Substance and Sexual Activity    Alcohol use: Yes     Frequency: 2-4 times a month     Drinks per session: 1 or 2     Binge frequency: Never     Comment: social    Drug use: Yes     Frequency: 2 0 times per week     Types: Marijuana     Comment: medical edible marijuana prescribed    Sexual activity: None   Lifestyle    Physical activity:     Days per week: None     Minutes per session: None    Stress: None   Relationships    Social connections:     Talks on phone: None     Gets together: None     Attends Jainism service: None     Active member of club or organization: None     Attends meetings of clubs or organizations: None     Relationship status: None    Intimate partner violence:     Fear of current or ex partner: None     Emotionally abused: None     Physically abused: None     Forced sexual activity: None   Other Topics Concern    None   Social History Narrative    None       Subjective         Objective    Physical Exam:   Assessment Type: Assess only  General Appearance: Alert, Awake  Respiratory Pattern: Normal  Chest Assessment: Chest expansion symmetrical  Bilateral Breath Sounds: Clear, Diminished    Vitals:  Blood pressure 98/64, pulse 90, temperature 99 9 °F (37 7 °C), resp  rate 17, height 5' 2 5" (1 588 m), weight 54 1 kg (119 lb 4 3 oz), SpO2 98 %, not currently breastfeeding  Imaging and other studies: I have personally reviewed pertinent reports  Patient admitted for covid rule out  Has history of asthma and CF   Spo2 on 4LNC is 98%  Patient uses albuteol 3-4 times per day with vest   She also uses Colymycin neb on a 28 day rotation  Patient states she will not be due for colymycin until mid April  Will continue with home regimen  No further modalities at this time            Plan    Respiratory Plan: Mild Distress pathway

## 2020-03-31 NOTE — LETTER
[address] [facility]  [address] [facility] [address]  Dept: [phone]    April 2, 2020     Patient: Graham Davila   YOB: 1975   Date of Visit: 3/31/2020       To Whom it May Concern:    Graham Davila is under my professional care  She was seen in the hospital from 3/31/2020   to 04/02/20  She is tested negative for COVID- 19 infection here  If you have any questions or concerns, please don't hesitate to call           Sincerely,          Brunilda Ghotra MD

## 2020-03-31 NOTE — CONSULTS
Pulmonary Consultation   Annel Fortune 45 y o  female MRN: [id number]  3/31/2020      Assessment:  1  Hypoxic respiratory failure, acute  2  Cough  3  Subjective and low-grade fever  4  Potential COVID19 infection  5  Immunosuppression  6  History of lung transplant x2  7  Cystic fibrosis    Plan:   Agree with minocycline and meropenem   Aggressive pulmonary toilet at least b i d   Await COVID19 test results   IV hydration  [address] is reportedly excepting the patient after COVID19 status is known   Supportive care   Continue pancreatic enzymes   Continue current tacrolimus and prednisone doses for now   I personally discussed my recommendations with the primary team   Will continue to follow this patient closely        Consult requested location: Unit/Bed#: -01  Physician Requesting Consult: Fan Restrepo MD   Reason for Consult:  Pneumonia, cystic fibrosis, history of bilateral lung transplant, history of unilateral lung transplant, hypoxemia, potential COVID19 infection    HPI:    Patient is a 81-year-old female with past medical she has cystic fibrosis status post bilateral lung transplant, 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), pancreatic insufficiency likely secondary to cystic fibrosis presents for worsening shortness of breath and hypoxemia at home  She states that when she woke up this morning she felt lightheaded and dizzy and short of breath  She checked her pulse ox which revealed an O2 sat of 40%  She came to the emergency department and was stable on her baseline of 4 L via nasal cannula oxygen  She was recently admitted at outside hospital for pneumonia that required VV ECMO  She was discharged last week  Currently she is afebrile, but is 99 9° on her temperature measurement  She still complains of increased shortness of breath and dry cough  She has no nausea or vomiting    Her last bowel movement was yesterday  She has no sick contacts but lives with multiple family members        Past Medical Hx  Cystic fibrosis  Bilateral lung transplant  Single lung transplant  Pancreatic insufficiency       Medications    Current Facility-Administered Medications:     acetaminophen (TYLENOL) tablet 650 mg, 650 mg, Oral, Q6H PRN, Kain Leung MD    albuterol (PROVENTIL HFA,VENTOLIN HFA) inhaler 2 puff, 2 puff, Inhalation, Q4H PRN, Kain Leung MD    ALPRAZolam Priscille Peach) tablet 1 mg, 1 mg, Oral, BID PRN, Kain Leung MD    apixaban (ELIQUIS) tablet 10 mg, 10 mg, Oral, BID, Kain Leung MD    atovaquone (MEPRON) oral suspension 1,500 mg, 1,500 mg, Oral, Daily, Kain Leung MD    benzonatate (TESSALON PERLES) capsule 100 mg, 100 mg, Oral, TID PRN, Kain Leung MD    dronabinol (MARINOL) capsule 5 mg, 5 mg, Oral, BID AC, Kain Leung MD    [START ON 4/1/2020] ferrous sulfate tablet 325 mg, 325 mg, Oral, Daily With Breakfast, Kain Leung MD    gabapentin (NEURONTIN) capsule 100 mg, 100 mg, Oral, BID, Kain Leung MD    HYDROmorphone (DILAUDID) injection 0 5 mg, 0 5 mg, Intravenous, Q6H PRN, Kain Leung MD    levalbuterol (XOPENEX) inhalation solution 1 25 mg, 1 25 mg, Nebulization, TID, Jean Marie Upton MD    magnesium oxide (MAG-OX) tablet 400 mg, 400 mg, Oral, BID, Kain Leung MD    minocycline (MINOCIN) capsule 100 mg, 100 mg, Oral, BID, Kain Leung MD    oxyCODONE (ROXICODONE) IR tablet 5 mg, 5 mg, Oral, Q4H PRN, Kain Leung MD, 5 mg at 03/31/20 1313    pancrelipase (Lip-Prot-Amyl) (CREON) delayed release capsule 48,000 Units, 48,000 Units, Oral, TID With Meals, MD Hiro Avitia  [START ON 4/1/2020] pantoprazole (PROTONIX) EC tablet 40 mg, 40 mg, Oral, Early Morning, Kain Leung MD    polyethylene glycol (MIRALAX) packet 17 g, 17 g, Oral, Daily PRN, Kain Leung MD    predniSONE tablet 10 mg, 10 mg, Oral, Daily, Tye Digna Bansal MD, 10 mg at 03/31/20 1407    sodium chloride 0 9 % inhalation solution 3 mL, 3 mL, Nebulization, TID, Huyen Rizzo MD  [facility]  [START ON 4/1/2020] tacrolimus (PROGRAF) capsule 2 5 mg, 2 5 mg, Oral, Daily With Breakfast, Vargas Cardona MD    tacrolimus (PROGRAF) capsule 3 mg, 3 mg, Oral, Daily With Claudine Padilla MD     ROS:  Twelve point review of systems was negative except for what is otherwise mentioned  Past Surgical Hx  Bilateral lung transplant  Unilateral lung transplant    Family Hx  Cystic fibrosis    Occupational History:   No known previous inhalation injuries    Social History:   Does not use tobacco  Uses marijuana 2 times per week      Vitals: Blood pressure 98/64, pulse 90, temperature 99 9 °F (37 7 °C), resp  rate 17, height 5' 2 5" (1 588 m), weight 54 1 kg (119 lb 4 3 oz), SpO2 98 %, not currently breastfeeding , Body mass index is 21 47 kg/m²  Physical Exam  Physical Exam was done by the primary service and discussed with me, due to the infection control issues related to the current COVID19 crisis  Imaging and other studies     I have personally viewed and interpreted the following studies:  Chest x-ray 03/31/2020:  Predominant right lung consolidation    This evaluation was performed remotely since pulmonology service was unavailable for bedside evaluation due to either positive for COVID19 infection or awaiting test results for COVID19 Infection  Personal interview of the patient was conducted via phone and physical examination has not been performed by me personally  Time spent for discussion with the requesting provider, review of the chart, evaluation of diagnostic data and review of the imaging personally was 45 minutes  Recommendations were personally discussed with Dr Lamar Avers  >50% of the time documented was direct discussion with the requesting provider      BENY Ramirez    [facility]

## 2020-03-31 NOTE — CONSULTS
Consultation - Infectious Disease   Rafael Rylie Segalshelia 45 y o  female MRN: [id number]  Unit/Bed#: -01 Encounter: [id number]      IMPRESSION & RECOMMENDATIONS:   Impression/Recommendations:  1  Acute on chronic hypoxic respiratory failure  Improved and now stable on oxygen by nasal cannula  Etiologies for worsening hypoxia include severe right lung atelectasis, new or worsening pulmonary infection  Patient remains on prolonged course of oral minocycline for treatment of stenotrophomonas infection managed at [facility]  Also consider other bacterial etiology  She was recently treated for Pseudomonas and MRSA pneumonia at [facility]  Chest x-ray is concerning for worsening right lung consolidation  Patient has no high fevers or leukocytosis, but these are less reliable markers in an immunosuppressed patient  Patient received 1 dose of meropenem  She reports some clinical improvement  Will continue empiric antibiotics for now     -restart meropenem 1 g q 8 hours  -if respiratory symptoms worsen, we can add empiric linezolid   -continue prolonged course of minocycline with ongoing follow-up at [facility]   -check sputum culture  -followup COVID-19 results  -monitor respiratory symptoms and O2 requirements  -pulmonology evaluation    2  Pneumonia  Likely bacterial etiology  Consider viral etiology  Influenza/RSV PCR were negative  COVID testing is pending, although I have a low clinical suspicion for this  Recent COVID-19 test at [facility] was negative     -antibiotic plan as above  -check sputum culture  -followup COVID 19    3  CF status post bilateral lung transplant in 2013, left lung re-transplant in 2017 maintained on chronic tacrolimus and prednisone  [facility]  She would likely benefit from transfer to [facility] for further management, pending negative COVID test     4  Recent Pseudomonas/MRSA/stenotrophomonas pneumonia    Remains on prolonged course of minocycline with plan for outpatient CT chest and followup with [facility]  5  Prior Mycobacterium abscessus/boletti pulmonary infection  Antibiotics:  Meropenem x1  Minocycline    I discussed above plan with Dr Blake Perez from primary service, and with patient over the phone  Personally reviewed prior hospitalization records, including records from [address] [place] [address], in detail  I spent 1 hour performing this consultation, more than half of which was spent in counseling/coordination of care as outlined in my note  Thank you for this consultation  We will follow along with you  HISTORY OF PRESENT ILLNESS:  Reason for Consult:  Pneumonia    HPI: Radha Bay is a 45 y o  female with cystic fibrosis status post bilateral lung transplant in 9477 complicated by rejection and 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+/-, EBV -/+) maintained on prednisone and tacrolimus, prior mycobacterium abscessus/bolleti infection managed at [facility], recent hospitalization at [facility] in late February 2020 secondary to influenza B  Most recently, patient was hospitalized at [facility] earlier this month due to severe acute hypoxic respiratory failure requiring intubation and ECMO  Etiology was felt to be complete right lung collapse, possible aspiration pneumonia  Patient underwent bronchoscopy on 03/07/2020 and cultures revealed Pseudomonas  Additional cultures later revealed few MRSA and stenotrophomonas  Patient was evaluated by ID and completed 16 days of IV meropenem, 7 days of linezolid, and about 12 days of minocycline  She underwent repeat bronchoscopy which again showed stenotrophomonas  She was continued on minocycline with plan for at least another month with repeat CT chest as outpatient  Patient was also tested for COVID-19 during this hospitalization and came back negative  She was ultimately discharged on 03/24/2020 on 4 L home oxygen        Now patient most recently presents to the ER today with complaint of worsening shortness of breath and right-sided pleuritic chest pain  She checked her oxygen at home and it was reading in the 40s  Initial O2 sat here was 83% but quickly improved to 100%  Chest x-ray is concerning for worsening consolidation in the right lung  Patient received 1 dose of IV meropenem  COVID-19 testing was sent  Patient states she is feeling better with less shortness of breath  Cough is intermittently productive and is reportedly consistent with her chronic cough  She had a temp of 99 9° here  No reported fevers at home  She lives at home with her , daughter and mother who have been healthy  Her  is still working and travels to various doctors offices for work  REVIEW OF SYSTEMS:  A complete system-based review of systems is otherwise negative      PAST MEDICAL HISTORY:  Past Medical History:   Diagnosis Date    Asthma     Chronic pain     Cystic fibrosis ([address] )     Kidney stone     PONV (postoperative nausea and vomiting)     Transplant recipient      Past Surgical History:   Procedure Laterality Date    APPENDECTOMY      CHOLECYSTECTOMY      FL RETROGRADE PYELOGRAM  1/9/2020    FL RETROGRADE PYELOGRAM  2/4/2020    LUNG SURGERY      FL CYSTO/URETERO W/LITHOTRIPSY &INDWELL STENT INSRT Right 2/4/2020    Procedure: CYSTOSCOPY URETEROSCOPY WITH LITHOTRIPSY HOLMIUM LASER, RETROGRADE PYELOGRAM AND INSERTION STENT URETERAL;  Surgeon: Mell Croft MD;  Location: [facility];  Service: Urology    FL CYSTOURETHROSCOPY,URETER CATHETER Right 1/9/2020    Procedure: CYSTOSCOPY RETROGRADE PYELOGRAM WITH INSERTION STENT URETERAL;  Surgeon: Mell Croft MD;  Location: [facility];  Service: Urology       FAMILY HISTORY:  Non-contributory    SOCIAL HISTORY:  Social History     Substance and Sexual Activity   Alcohol Use Yes    Frequency: 2-4 times a month    Drinks per session: 1 or 2    Binge frequency: Never    Comment: social     Social History Substance and Sexual Activity   Drug Use Yes    Frequency: 2 0 times per week    Types: Marijuana    Comment: medical edible marijuana prescribed     Social History     Tobacco Use   Smoking Status Never Smoker   Smokeless Tobacco Never Used       ALLERGIES:  Allergies   Allergen Reactions    Vancomycin Angioedema and Hives    Ceftazidime Headache     Severe headaches after desensitization       MEDICATIONS:  All current active medications have been reviewed  PHYSICAL EXAM:  Vitals:  Temp:  [98 2 °F (36 8 °C)-99 9 °F (37 7 °C)] 99 9 °F (37 7 °C)  HR:  [] 90  Resp:  [14-22] 17  BP: ()/(53-64) 98/64  SpO2:  [83 %-100 %] 99 %  Temp (24hrs), Av 1 °F (37 3 °C), Min:98 2 °F (36 8 °C), Max:99 9 °F (37 7 °C)  Current: Temperature: 99 9 °F (37 7 °C)     Physical Exam:  Patient seen at outside the window  Physical exam is limited due to important infection control aspect related to current COVID-19 crisis  Discussed with primary service  General:  Awake, alert, nontoxic, resting comfortably in bed  Normocephalic atraumatic  Lungs:  Nonlabored breathing  Cardiac:  Regular rate  Abdomen:  Nondistended  Extremities:  No peripheral cyanosis, clubbing, or edema  Skin:  No rashes, no ulcers  Neurological:  Moves all four extremities spontaneously  Psychiatric:  No acute psychosis    LABS, IMAGING, & OTHER STUDIES:  Lab Results:  I have personally reviewed pertinent labs  Results from last 7 days   Lab Units 20  0751   POTASSIUM mmol/L 3 6   CHLORIDE mmol/L 106   CO2 mmol/L 29   BUN mg/dL 19   CREATININE mg/dL 0 91   EGFR ml/min/1 73sq m 78   CALCIUM mg/dL 8 5   AST U/L 32   ALT U/L 49   ALK PHOS U/L 103     Results from last 7 days   Lab Units 20  0751   WBC Thousand/uL 9 45   HEMOGLOBIN g/dL 7 7*   PLATELETS Thousands/uL 184     Results from last 7 days   Lab Units 20  0915 20  0912   BLOOD CULTURE  Received in Microbiology Lab  Culture in Progress   Received in Microbiology Lab  Culture in Progress  Imaging Studies:   I have personally reviewed pertinent imaging study reports and images in PACS  Chest x-ray shows worsening consolidation in right lung  Chronic volume loss in right lung with mediastinal shift to the right  EKG, Pathology, and Other Studies:   I have personally reviewed pertinent reports

## 2020-04-01 PROBLEM — D64.9 ANEMIA: Status: ACTIVE | Noted: 2020-04-01

## 2020-04-01 LAB
ABO GROUP BLD: NORMAL
ABO GROUP BLD: NORMAL
ALBUMIN SERPL BCP-MCNC: 1.9 G/DL (ref 3.5–5)
ALP SERPL-CCNC: 145 U/L (ref 46–116)
ALT SERPL W P-5'-P-CCNC: 137 U/L (ref 12–78)
ANION GAP SERPL CALCULATED.3IONS-SCNC: 6 MMOL/L (ref 4–13)
AST SERPL W P-5'-P-CCNC: 133 U/L (ref 5–45)
BASOPHILS # BLD AUTO: 0.01 THOUSANDS/ΜL (ref 0–0.1)
BASOPHILS NFR BLD AUTO: 0 % (ref 0–1)
BILIRUB SERPL-MCNC: 0.2 MG/DL (ref 0.2–1)
BLD GP AB SCN SERPL QL: NEGATIVE
BUN SERPL-MCNC: 19 MG/DL (ref 5–25)
CALCIUM SERPL-MCNC: 7.9 MG/DL (ref 8.3–10.1)
CHLORIDE SERPL-SCNC: 108 MMOL/L (ref 100–108)
CO2 SERPL-SCNC: 27 MMOL/L (ref 21–32)
CREAT SERPL-MCNC: 0.86 MG/DL (ref 0.6–1.3)
EOSINOPHIL # BLD AUTO: 0.06 THOUSAND/ΜL (ref 0–0.61)
EOSINOPHIL NFR BLD AUTO: 1 % (ref 0–6)
ERYTHROCYTE [DISTWIDTH] IN BLOOD BY AUTOMATED COUNT: 15.8 % (ref 11.6–15.1)
EST. AVERAGE GLUCOSE BLD GHB EST-MCNC: 143 MG/DL
GFR SERPL CREATININE-BSD FRML MDRD: 84 ML/MIN/1.73SQ M
GLUCOSE SERPL-MCNC: 141 MG/DL (ref 65–140)
GLUCOSE SERPL-MCNC: 175 MG/DL (ref 65–140)
GLUCOSE SERPL-MCNC: 176 MG/DL (ref 65–140)
GLUCOSE SERPL-MCNC: 177 MG/DL (ref 65–140)
GLUCOSE SERPL-MCNC: 333 MG/DL (ref 65–140)
HBA1C MFR BLD: 6.6 %
HCT VFR BLD AUTO: 22.7 % (ref 34.8–46.1)
HCT VFR BLD AUTO: 23.6 % (ref 34.8–46.1)
HCT VFR BLD AUTO: 28.5 % (ref 34.8–46.1)
HGB BLD-MCNC: 6.5 G/DL (ref 11.5–15.4)
HGB BLD-MCNC: 6.6 G/DL (ref 11.5–15.4)
HGB BLD-MCNC: 8.3 G/DL (ref 11.5–15.4)
IMM GRANULOCYTES # BLD AUTO: 0.02 THOUSAND/UL (ref 0–0.2)
IMM GRANULOCYTES NFR BLD AUTO: 0 % (ref 0–2)
LACTATE SERPL-SCNC: 1.4 MMOL/L (ref 0.5–2)
LYMPHOCYTES # BLD AUTO: 1.31 THOUSANDS/ΜL (ref 0.6–4.47)
LYMPHOCYTES NFR BLD AUTO: 29 % (ref 14–44)
MCH RBC QN AUTO: 27 PG (ref 26.8–34.3)
MCHC RBC AUTO-ENTMCNC: 28 G/DL (ref 31.4–37.4)
MCV RBC AUTO: 97 FL (ref 82–98)
MONOCYTES # BLD AUTO: 0.12 THOUSAND/ΜL (ref 0.17–1.22)
MONOCYTES NFR BLD AUTO: 3 % (ref 4–12)
NEUTROPHILS # BLD AUTO: 3 THOUSANDS/ΜL (ref 1.85–7.62)
NEUTS SEG NFR BLD AUTO: 67 % (ref 43–75)
NRBC BLD AUTO-RTO: 0 /100 WBCS
PLATELET # BLD AUTO: 143 THOUSANDS/UL (ref 149–390)
PMV BLD AUTO: 10 FL (ref 8.9–12.7)
POTASSIUM SERPL-SCNC: 4.1 MMOL/L (ref 3.5–5.3)
PROCALCITONIN SERPL-MCNC: <0.05 NG/ML
PROT SERPL-MCNC: 5.2 G/DL (ref 6.4–8.2)
RBC # BLD AUTO: 2.44 MILLION/UL (ref 3.81–5.12)
RH BLD: NEGATIVE
RH BLD: NEGATIVE
SODIUM SERPL-SCNC: 141 MMOL/L (ref 136–145)
SPECIMEN EXPIRATION DATE: NORMAL
WBC # BLD AUTO: 4.52 THOUSAND/UL (ref 4.31–10.16)

## 2020-04-01 PROCEDURE — 87205 SMEAR GRAM STAIN: CPT | Performed by: INTERNAL MEDICINE

## 2020-04-01 PROCEDURE — 86900 BLOOD TYPING SEROLOGIC ABO: CPT | Performed by: NURSE PRACTITIONER

## 2020-04-01 PROCEDURE — 86850 RBC ANTIBODY SCREEN: CPT | Performed by: NURSE PRACTITIONER

## 2020-04-01 PROCEDURE — 87186 SC STD MICRODIL/AGAR DIL: CPT | Performed by: INTERNAL MEDICINE

## 2020-04-01 PROCEDURE — 85018 HEMOGLOBIN: CPT | Performed by: NURSE PRACTITIONER

## 2020-04-01 PROCEDURE — 94640 AIRWAY INHALATION TREATMENT: CPT

## 2020-04-01 PROCEDURE — 86901 BLOOD TYPING SEROLOGIC RH(D): CPT | Performed by: NURSE PRACTITIONER

## 2020-04-01 PROCEDURE — 85014 HEMATOCRIT: CPT | Performed by: INTERNAL MEDICINE

## 2020-04-01 PROCEDURE — 84145 PROCALCITONIN (PCT): CPT | Performed by: INTERNAL MEDICINE

## 2020-04-01 PROCEDURE — 85025 COMPLETE CBC W/AUTO DIFF WBC: CPT | Performed by: INTERNAL MEDICINE

## 2020-04-01 PROCEDURE — 80053 COMPREHEN METABOLIC PANEL: CPT | Performed by: INTERNAL MEDICINE

## 2020-04-01 PROCEDURE — 82948 REAGENT STRIP/BLOOD GLUCOSE: CPT

## 2020-04-01 PROCEDURE — P9016 RBC LEUKOCYTES REDUCED: HCPCS

## 2020-04-01 PROCEDURE — 83605 ASSAY OF LACTIC ACID: CPT | Performed by: INTERNAL MEDICINE

## 2020-04-01 PROCEDURE — 30233N1 TRANSFUSION OF NONAUTOLOGOUS RED BLOOD CELLS INTO PERIPHERAL VEIN, PERCUTANEOUS APPROACH: ICD-10-PCS | Performed by: INTERNAL MEDICINE

## 2020-04-01 PROCEDURE — 99233 SBSQ HOSP IP/OBS HIGH 50: CPT | Performed by: INTERNAL MEDICINE

## 2020-04-01 PROCEDURE — 85018 HEMOGLOBIN: CPT | Performed by: INTERNAL MEDICINE

## 2020-04-01 PROCEDURE — 83036 HEMOGLOBIN GLYCOSYLATED A1C: CPT | Performed by: INTERNAL MEDICINE

## 2020-04-01 PROCEDURE — 87106 FUNGI IDENTIFICATION YEAST: CPT | Performed by: INTERNAL MEDICINE

## 2020-04-01 PROCEDURE — 85014 HEMATOCRIT: CPT | Performed by: NURSE PRACTITIONER

## 2020-04-01 PROCEDURE — 87635 SARS-COV-2 COVID-19 AMP PRB: CPT | Performed by: EMERGENCY MEDICINE

## 2020-04-01 PROCEDURE — 87070 CULTURE OTHR SPECIMN AEROBIC: CPT | Performed by: INTERNAL MEDICINE

## 2020-04-01 PROCEDURE — 94762 N-INVAS EAR/PLS OXIMTRY CONT: CPT

## 2020-04-01 PROCEDURE — 94669 MECHANICAL CHEST WALL OSCILL: CPT

## 2020-04-01 PROCEDURE — 87077 CULTURE AEROBIC IDENTIFY: CPT | Performed by: INTERNAL MEDICINE

## 2020-04-01 RX ORDER — TACROLIMUS 1 MG/1
3 CAPSULE ORAL
Status: DISCONTINUED | OUTPATIENT
Start: 2020-04-01 | End: 2020-04-02 | Stop reason: HOSPADM

## 2020-04-01 RX ADMIN — HYDROMORPHONE HYDROCHLORIDE 0.5 MG: 1 INJECTION, SOLUTION INTRAMUSCULAR; INTRAVENOUS; SUBCUTANEOUS at 00:32

## 2020-04-01 RX ADMIN — ISODIUM CHLORIDE 3 ML: 0.03 SOLUTION RESPIRATORY (INHALATION) at 13:43

## 2020-04-01 RX ADMIN — OXYCODONE HYDROCHLORIDE 10 MG: 10 TABLET ORAL at 09:12

## 2020-04-01 RX ADMIN — ALPRAZOLAM 1 MG: 0.5 TABLET ORAL at 21:41

## 2020-04-01 RX ADMIN — HYDROMORPHONE HYDROCHLORIDE 0.5 MG: 1 INJECTION, SOLUTION INTRAMUSCULAR; INTRAVENOUS; SUBCUTANEOUS at 19:26

## 2020-04-01 RX ADMIN — OXYCODONE HYDROCHLORIDE 10 MG: 10 TABLET ORAL at 04:55

## 2020-04-01 RX ADMIN — PANTOPRAZOLE SODIUM 40 MG: 40 TABLET, DELAYED RELEASE ORAL at 05:23

## 2020-04-01 RX ADMIN — OXYCODONE HYDROCHLORIDE 10 MG: 10 TABLET ORAL at 16:13

## 2020-04-01 RX ADMIN — HYDROMORPHONE HYDROCHLORIDE 0.5 MG: 1 INJECTION, SOLUTION INTRAMUSCULAR; INTRAVENOUS; SUBCUTANEOUS at 06:51

## 2020-04-01 RX ADMIN — OXYCODONE HYDROCHLORIDE 10 MG: 10 TABLET ORAL at 21:42

## 2020-04-01 RX ADMIN — MEROPENEM 1000 MG: 1 INJECTION, POWDER, FOR SOLUTION INTRAVENOUS at 00:32

## 2020-04-01 RX ADMIN — PANCRELIPASE 48000 UNITS: 24000; 76000; 120000 CAPSULE, DELAYED RELEASE PELLETS ORAL at 11:15

## 2020-04-01 RX ADMIN — GABAPENTIN 100 MG: 100 CAPSULE ORAL at 09:13

## 2020-04-01 RX ADMIN — ALBUTEROL SULFATE 2 PUFF: 90 AEROSOL, METERED RESPIRATORY (INHALATION) at 09:11

## 2020-04-01 RX ADMIN — ISODIUM CHLORIDE 3 ML: 0.03 SOLUTION RESPIRATORY (INHALATION) at 08:51

## 2020-04-01 RX ADMIN — MEROPENEM 1000 MG: 1 INJECTION, POWDER, FOR SOLUTION INTRAVENOUS at 16:27

## 2020-04-01 RX ADMIN — PANCRELIPASE 48000 UNITS: 24000; 76000; 120000 CAPSULE, DELAYED RELEASE PELLETS ORAL at 07:55

## 2020-04-01 RX ADMIN — MAGNESIUM OXIDE TAB 400 MG (241.3 MG ELEMENTAL MG) 400 MG: 400 (241.3 MG) TAB at 18:13

## 2020-04-01 RX ADMIN — INSULIN LISPRO 1 UNITS: 100 INJECTION, SOLUTION INTRAVENOUS; SUBCUTANEOUS at 21:40

## 2020-04-01 RX ADMIN — INSULIN LISPRO 5 UNITS: 100 INJECTION, SOLUTION INTRAVENOUS; SUBCUTANEOUS at 16:19

## 2020-04-01 RX ADMIN — ATOVAQUONE 1500 MG: 750 SUSPENSION ORAL at 09:12

## 2020-04-01 RX ADMIN — HYDROMORPHONE HYDROCHLORIDE 0.5 MG: 1 INJECTION, SOLUTION INTRAMUSCULAR; INTRAVENOUS; SUBCUTANEOUS at 13:01

## 2020-04-01 RX ADMIN — DRONABINOL 5 MG: 2.5 CAPSULE ORAL at 16:13

## 2020-04-01 RX ADMIN — TACROLIMUS 3 MG: 1 CAPSULE, GELATIN COATED ORAL at 21:43

## 2020-04-01 RX ADMIN — INSULIN LISPRO 1 UNITS: 100 INJECTION, SOLUTION INTRAVENOUS; SUBCUTANEOUS at 11:15

## 2020-04-01 RX ADMIN — MAGNESIUM OXIDE TAB 400 MG (241.3 MG ELEMENTAL MG) 400 MG: 400 (241.3 MG) TAB at 09:13

## 2020-04-01 RX ADMIN — DRONABINOL 5 MG: 2.5 CAPSULE ORAL at 07:55

## 2020-04-01 RX ADMIN — TACROLIMUS 2.5 MG: 0.5 CAPSULE ORAL at 07:55

## 2020-04-01 RX ADMIN — PREDNISONE 10 MG: 10 TABLET ORAL at 09:13

## 2020-04-01 RX ADMIN — MEROPENEM 1000 MG: 1 INJECTION, POWDER, FOR SOLUTION INTRAVENOUS at 09:15

## 2020-04-01 RX ADMIN — LEVALBUTEROL HYDROCHLORIDE 1.25 MG: 1.25 SOLUTION, CONCENTRATE RESPIRATORY (INHALATION) at 08:51

## 2020-04-01 RX ADMIN — GABAPENTIN 100 MG: 100 CAPSULE ORAL at 18:13

## 2020-04-01 RX ADMIN — FERROUS SULFATE TAB 325 MG (65 MG ELEMENTAL FE) 325 MG: 325 (65 FE) TAB at 07:55

## 2020-04-01 RX ADMIN — LEVALBUTEROL HYDROCHLORIDE 1.25 MG: 1.25 SOLUTION, CONCENTRATE RESPIRATORY (INHALATION) at 13:44

## 2020-04-01 RX ADMIN — MINOCYCLINE HYDROCHLORIDE 100 MG: 50 CAPSULE ORAL at 09:10

## 2020-04-01 RX ADMIN — PANCRELIPASE 48000 UNITS: 24000; 76000; 120000 CAPSULE, DELAYED RELEASE PELLETS ORAL at 16:16

## 2020-04-01 RX ADMIN — MINOCYCLINE HYDROCHLORIDE 100 MG: 50 CAPSULE ORAL at 18:15

## 2020-04-01 NOTE — SOCIAL WORK
Pt is a tentative transfer to [address] [place] [address] pending negative Covid  Test results are still pending  Pt is a b/l lung transplant  PT IS A [id number]

## 2020-04-01 NOTE — ASSESSMENT & PLAN NOTE
Hemoglobin 6 5 this morning  On admission was 7 7  Discussed with KIANNA Mcdonnell  Her hemoglobin 1 discharge was 7 4  No active bleeding  FOBT requested still pending  Will give 1 unit PRBC transfusion  Okay with KIANNA Mcdonnell    Monitor hemoglobin q 6 hours

## 2020-04-01 NOTE — PROGRESS NOTES
Progress Note - Infectious Disease   Radha Fortune 45 y o  female MRN: [id number]  Unit/Bed#: -01 Encounter: [id number]      Impression/Recommendations:  1  Pneumonia, likely bacterial etiology  Patient has history of Pseudomonas and MRSA pneumonia  She is on minocycline suppression  Meropenem was started in ER  Patient is clinically improved  Will continue IV meropenem  If patient worsens clinically, I would also change minocycline to IV vancomycin  As long as she remains clinically stable, will hold off on this  Patient has low risk for COVID epidemiologically, clinically and radiologically  In addition, she had a recent negative COVID PCR  Continue IV meropenem  Monitor temperature/WBC  Monitor respiratory symptoms closely  If patient deteriorates, I would change p o  Minocycline to IV vancomycin  Follow-up on COVID PCR  2  Acute on chronic hypoxic respiratory failure, multifactorial   Above pneumonia plays a role  Patient is clinically stable  No evidence of respiratory distress clinically  Antibiotic plan as in above  O2 support per primary service  3  CF, status post bilateral lung transplant with left lung retransplant  Patient is on stable on tacrolimus and prednisone  Plan for transfer to [place] [address] noted, as soon as COVID PCR is negative  Continue atovaquone suppression  4  History of MRSA pneumonia  Patient is on minocycline suppression  Continue minocycline suppression  5  Prior Mycobacterium abscesses pulmonary infection  Discussed with patient in detail regarding the above plan  Discussed with Dr Hanna Ray from [facility] service  I spent 45 minutes on the unit floor, of which 25 minutes were spent in counseling and coordination of care, as outlined above  Antibiotics:  Meropenem 2  Minocycline    Subjective:  Patient states dyspnea is about the same  Cough stable, without purulence sputum  Temperature stays down  No chills    She is tolerating antibiotics well  No nausea, vomiting or diarrhea  Objective:  Vitals:  Temp:  [98 2 °F (36 8 °C)-99 9 °F (37 7 °C)] 99 4 °F (37 4 °C)  HR:  [85-99] 91  Resp:  [16-18] 18  BP: ()/(54-70) 109/69  SpO2:  [92 %-100 %] 97 %  Temp (24hrs), Av 9 °F (37 2 °C), Min:98 2 °F (36 8 °C), Max:99 9 °F (37 7 °C)  Current: Temperature: 99 4 °F (37 4 °C)    Physical Exam:    Parts of the exam were were done by the Medicine service and discussed with me, due to the infection control issues related to the current COVID crisis  General: Awake, alert, cooperative, no distress  Lungs: Expansion symmetric, no rales, no wheezing, respirations unlabored  Heart:  Regular rate and rhythm, S1 and S2 normal, no murmur  Abdomen: Soft, nondistended, non-tender, bowel sounds active all four quadrants, no masses, no organomegaly  Extremities: No edema  No ulcer  Nontender to palpation  Skin:  No rash  Neuro: Moves all extremities  Invasive Devices     Central Venous Catheter Line            Port A Cath Right Chest -- days          Drain            Ureteral Drain/Stent Right ureter 6 Fr  56 days                Labs studies:   I have personally reviewed pertinent labs  Results from last 7 days   Lab Units 20  04320  0751   POTASSIUM mmol/L 4 1 3 6   CHLORIDE mmol/L 108 106   CO2 mmol/L 27 29   BUN mg/dL 19 19   CREATININE mg/dL 0 86 0 91   EGFR ml/min/1 73sq m 84 78   CALCIUM mg/dL 7 9* 8 5   AST U/L 133* 32   ALT U/L 137* 49   ALK PHOS U/L 145* 103     Results from last 7 days   Lab Units 20  0527 20  0439 20  0751   WBC Thousand/uL  --  4 52 9 45   HEMOGLOBIN g/dL 6 5* 6 6* 7 7*   PLATELETS Thousands/uL  --  143* 184     Results from last 7 days   Lab Units 20  0915 20  0912   BLOOD CULTURE  Received in Microbiology Lab  Culture in Progress  Received in Microbiology Lab  Culture in Progress         Imaging Studies:   I have personally reviewed pertinent imaging study reports and images in PACS  EKG, Pathology, and Other Studies:   I have personally reviewed pertinent reports

## 2020-04-01 NOTE — PROGRESS NOTES
Hg dropped to 6 6 from 7 7  As per RN, patient exhibits NO signs of bleeding/no melena  VSS    On Eliquis for DVT   Hold for now  Type and screen ordered  Monitor Hg/Hct q6h  Will need consent for transfusion  Stool occult ordered  Pt on po Ferrous sulfate

## 2020-04-01 NOTE — PROGRESS NOTES
Progress Note Sukhdeep Bailey 1975, 45 y o  female MRN: [id number]    Unit/Bed#: -01 Encounter: [id number]    Primary Care Provider: Nidia Reyes MD   Date and time admitted to hospital: 3/31/2020  7:24 AM        * Acute and chronic respiratory failure with hypoxia [facility])  Assessment & Plan  This is a 45years old female with history of bilateral lung transplant, recent history of intubation and mechanical ventilation, she was transferred to [facility] for better management  She was on IV antibiotics and ECMO over there  She was discharged home on March 25th on 4 L oxygen  She was given minocycline for 1 month  Now this morning she was having more short of breath and hypoxia  As per patient her O2 sat was 40 at home  In the ER initially she was placed on 6 L oxygen and now currently back to for L  Saturating well  Chest x-ray showed right-sided infiltrate  Emergency room physician called her transplant center and discussed the case  Recommended to send COVID and monitor here  No need to transfer now  Patient been admitted for further management  Discussed personally with Pulmonary and Infectious Disease Service  Given her prolonged history an immunocompromised status, will start her on meropenem  Continue minocycline  Follow up the cultures  Maintain contact and airborne isolation  Patient denies any travel anywhere outside [facility]   Influenza and RSV negative  Today I discussed with her transplant physician Dr An over the phone  Recommended to continue the same  1 unit blood transfusion for anemia  Patient saturating well at 2 L now    Anemia  Assessment & Plan  Hemoglobin 6 5 this morning  On admission was 7 7  Discussed with KIANNA Mcdonnell  Her hemoglobin 1 discharge was 7 4  No active bleeding  FOBT requested still pending  Will give 1 unit PRBC transfusion  Okay with KIANNA Mcdonnell    Monitor hemoglobin q 6 hours    Dvt femoral (deep venous thrombosis) (HCC)  Assessment & Plan  Diagnosed recently  Currently on Eliquis  Currently on hold because of anemia  If hemoglobin remains stable will resume    Chronic pain disorder  Assessment & Plan  Patient takes oxycodone at home  Continue  Also continue Dilaudid p r n  For breakthrough pain    HCAP (healthcare-associated pneumonia)  Assessment & Plan  Management as above    Pancreatic insufficiency  Assessment & Plan  Continue supplement    Hyperglycemia  Assessment & Plan  Continue sliding scale  Hemoglobin A1c 6 5    Cystic fibrosis (HCC)  Assessment & Plan  Status post lung transplant  Monitor closely    History of lung transplant [facility])  Assessment & Plan  Continue immunosuppression medications including prednisone and tacrolimus        VTE Pharmacologic Prophylaxis:   Pharmacologic: Pharmacologic VTE Prophylaxis contraindicated due to Anemia, was on Eliquis  Will restart if hemoglobin remains stable  Mechanical VTE Prophylaxis in Place: Yes    Patient Centered Rounds: I have performed bedside rounds with nursing staff today  Discussions with Specialists or Other Care Team Provider:  Pulmonary, Infectious Disease, transplant physician at [facility] and Discussions with Family / Patient:  Patient    Time Spent for Care: More than 50% of total time spent on counseling and coordination of care as described above  Current Length of Stay: 1 day(s)    Current Patient Status: Inpatient   Certification Statement: The patient will continue to require additional inpatient hospital stay due to Hypoxia    Discharge Plan:  Not clear yet    Code Status: Level 1 - Full Code      Subjective:   I have seen the patient bedside this morning  Patient feels better  Currently on oxygen 2 L via nasal cannula  Her hemoglobin dropped to 6 5 this morning  No active bleeding  No blood in the stool    Afebrile    Objective:     Vitals:   Temp (24hrs), Av 8 °F (37 1 °C), Min:98 2 °F (36 8 °C), Max:99 4 °F (37 4 °C)    Temp:  [98 2 °F (36 8 °C)-99 4 °F (37 4 °C)] 98 9 °F (37 2 °C)  HR:  [85-99] 86  Resp:  [16-18] 18  BP: (100-116)/(61-71) 111/71  SpO2:  [92 %-99 %] 98 %  Body mass index is 21 47 kg/m²  Input and Output Summary (last 24 hours): Intake/Output Summary (Last 24 hours) at 4/1/2020 1416  Last data filed at 4/1/2020 1300  Gross per 24 hour   Intake 510 ml   Output    Net 510 ml       Physical Exam:     Physical Exam    Limited due to covid rule out diagnosis    General- Awake, alert and oriented x 3, looks comfortable  Respiratory system- not using any accessory muscles  Musculoskeletal- No gross deformity  Psych- No acute psychosis  CNS- moving all extremities    Additional Data:     Labs:    Results from last 7 days   Lab Units 04/01/20  0527 04/01/20  0439   WBC Thousand/uL  --  4 52   HEMOGLOBIN g/dL 6 5* 6 6*   HEMATOCRIT % 22 7* 23 6*   PLATELETS Thousands/uL  --  143*   NEUTROS PCT %  --  67   LYMPHS PCT %  --  29   MONOS PCT %  --  3*   EOS PCT %  --  1     Results from last 7 days   Lab Units 04/01/20  0439   SODIUM mmol/L 141   POTASSIUM mmol/L 4 1   CHLORIDE mmol/L 108   CO2 mmol/L 27   BUN mg/dL 19   CREATININE mg/dL 0 86   ANION GAP mmol/L 6   CALCIUM mg/dL 7 9*   ALBUMIN g/dL 1 9*   TOTAL BILIRUBIN mg/dL 0 20   ALK PHOS U/L 145*   ALT U/L 137*   AST U/L 133*   GLUCOSE RANDOM mg/dL 141*     Results from last 7 days   Lab Units 03/31/20  0751   INR  1 31*     Results from last 7 days   Lab Units 04/01/20  1113 04/01/20  0606 03/31/20  2046 03/31/20  1529   POC GLUCOSE mg/dl 177* 175* 302* 151*     Results from last 7 days   Lab Units 04/01/20  0439   HEMOGLOBIN A1C % 6 6*     Results from last 7 days   Lab Units 04/01/20  0439 03/31/20  1415 03/31/20  0949 03/31/20  0751   LACTIC ACID mmol/L 1 4 1 3 2 3* 2 1*           * I Have Reviewed All Lab Data Listed Above  * Additional Pertinent Lab Tests Reviewed:  All Labs Within Last 24 Hours Reviewed    Imaging:    Imaging Reports Reviewed Today Include:   Imaging Personally Reviewed by Myself Includes:      Recent Cultures (last 7 days):     Results from last 7 days   Lab Units 03/31/20  0915 03/31/20  0912   BLOOD CULTURE  No Growth at 24 hrs  No Growth at 24 hrs  Last 24 Hours Medication List:     Current Facility-Administered Medications:  acetaminophen 650 mg Oral Q6H PRN William Ranks, MD    albuterol 2 puff Inhalation Q4H PRN William Ranks, MD    ALPRAZolam 1 mg Oral BID PRN William Ranks, MD    atovaquone 1,500 mg Oral Daily William Ranks, MD    benzonatate 100 mg Oral TID PRN William Ranks, MD    dronabinol 5 mg Oral BID AC William Ranks, MD    ferrous sulfate 325 mg Oral Daily With Breakfast William Ranks, MD    gabapentin 100 mg Oral BID William Ranks, MD    HYDROmorphone 0 5 mg Intravenous Q6H PRN William Ranks, MD    insulin lispro 1-6 Units Subcutaneous TID AC William Ranks, MD    insulin lispro 1-6 Units Subcutaneous HS William Ranks, MD    levalbuterol 1 25 mg Nebulization TID Fan Quiet, MD    magnesium oxide 400 mg Oral BID William Ranks, MD    meropenem 1,000 mg Intravenous Q8H Vesta Aldea, DO Last Rate: 1,000 mg (04/01/20 0915)   minocycline 100 mg Oral BID William Ranks, MD    oxyCODONE 10 mg Oral Q4H PRN William Ranks, MD    pancrelipase (Lip-Prot-Amyl) 48,000 Units Oral TID With Meals William Ranks, MD    pantoprazole 40 mg Oral Early Morning William Ranks, MD    polyethylene glycol 17 g Oral Daily PRN William Ranks, MD    predniSONE 10 mg Oral Daily William Ranks, MD    sodium chloride 3 mL Nebulization TID Fan Quiet, MD    tacrolimus 2 5 mg Oral Daily With Breakfast William Ranks, MD    tacrolimus 3 mg Oral Daily With Bernantonio Marley MD         Today, Patient Was Seen By: Phan Bowman MD    ** Please Note: Dictation voice to text software may have been used in the creation of this document   **

## 2020-04-01 NOTE — ASSESSMENT & PLAN NOTE
Diagnosed recently  Currently on Eliquis  Currently on hold because of anemia    If hemoglobin remains stable will resume

## 2020-04-01 NOTE — ASSESSMENT & PLAN NOTE
This is a 45years old female with history of bilateral lung transplant, recent history of intubation and mechanical ventilation, she was transferred to [facility] for better management  She was on IV antibiotics and ECMO over there  She was discharged home on March 25th on 4 L oxygen  She was given minocycline for 1 month  Now this morning she was having more short of breath and hypoxia  As per patient her O2 sat was 40 at home  In the ER initially she was placed on 6 L oxygen and now currently back to for L  Saturating well  Chest x-ray showed right-sided infiltrate  Emergency room physician called her transplant center and discussed the case  Recommended to send COVID and monitor here  No need to transfer now  Patient been admitted for further management  Discussed personally with Pulmonary and Infectious Disease Service  Given her prolonged history an immunocompromised status, will start her on meropenem  Continue minocycline  Follow up the cultures  Maintain contact and airborne isolation  Patient denies any travel anywhere outside [facility]   Influenza and RSV negative  Today I discussed with her transplant physician Dr An over the phone  Recommended to continue the same  1 unit blood transfusion for anemia    Patient saturating well at 2 L now

## 2020-04-02 VITALS
OXYGEN SATURATION: 98 % | HEIGHT: 63 IN | DIASTOLIC BLOOD PRESSURE: 73 MMHG | TEMPERATURE: 98.8 F | RESPIRATION RATE: 16 BRPM | SYSTOLIC BLOOD PRESSURE: 115 MMHG | BODY MASS INDEX: 21.13 KG/M2 | WEIGHT: 119.27 LBS | HEART RATE: 89 BPM

## 2020-04-02 LAB
ABO GROUP BLD BPU: NORMAL
ANION GAP SERPL CALCULATED.3IONS-SCNC: 5 MMOL/L (ref 4–13)
BASOPHILS # BLD AUTO: 0.01 THOUSANDS/ΜL (ref 0–0.1)
BASOPHILS NFR BLD AUTO: 0 % (ref 0–1)
BPU ID: NORMAL
BUN SERPL-MCNC: 16 MG/DL (ref 5–25)
CALCIUM SERPL-MCNC: 8.5 MG/DL (ref 8.3–10.1)
CHLORIDE SERPL-SCNC: 105 MMOL/L (ref 100–108)
CO2 SERPL-SCNC: 31 MMOL/L (ref 21–32)
CREAT SERPL-MCNC: 0.81 MG/DL (ref 0.6–1.3)
CROSSMATCH: NORMAL
EOSINOPHIL # BLD AUTO: 0.08 THOUSAND/ΜL (ref 0–0.61)
EOSINOPHIL NFR BLD AUTO: 2 % (ref 0–6)
ERYTHROCYTE [DISTWIDTH] IN BLOOD BY AUTOMATED COUNT: 15.2 % (ref 11.6–15.1)
GFR SERPL CREATININE-BSD FRML MDRD: 90 ML/MIN/1.73SQ M
GLUCOSE SERPL-MCNC: 146 MG/DL (ref 65–140)
GLUCOSE SERPL-MCNC: 162 MG/DL (ref 65–140)
GLUCOSE SERPL-MCNC: 164 MG/DL (ref 65–140)
HCT VFR BLD AUTO: 27.7 % (ref 34.8–46.1)
HCT VFR BLD AUTO: 27.9 % (ref 34.8–46.1)
HGB BLD-MCNC: 8.2 G/DL (ref 11.5–15.4)
HGB BLD-MCNC: 8.3 G/DL (ref 11.5–15.4)
IMM GRANULOCYTES # BLD AUTO: 0.03 THOUSAND/UL (ref 0–0.2)
IMM GRANULOCYTES NFR BLD AUTO: 1 % (ref 0–2)
LYMPHOCYTES # BLD AUTO: 1.17 THOUSANDS/ΜL (ref 0.6–4.47)
LYMPHOCYTES NFR BLD AUTO: 26 % (ref 14–44)
MCH RBC QN AUTO: 28.1 PG (ref 26.8–34.3)
MCHC RBC AUTO-ENTMCNC: 29.6 G/DL (ref 31.4–37.4)
MCV RBC AUTO: 95 FL (ref 82–98)
MONOCYTES # BLD AUTO: 0.18 THOUSAND/ΜL (ref 0.17–1.22)
MONOCYTES NFR BLD AUTO: 4 % (ref 4–12)
NEUTROPHILS # BLD AUTO: 3.09 THOUSANDS/ΜL (ref 1.85–7.62)
NEUTS SEG NFR BLD AUTO: 67 % (ref 43–75)
NRBC BLD AUTO-RTO: 0 /100 WBCS
PLATELET # BLD AUTO: 169 THOUSANDS/UL (ref 149–390)
PMV BLD AUTO: 10.8 FL (ref 8.9–12.7)
POTASSIUM SERPL-SCNC: 3.8 MMOL/L (ref 3.5–5.3)
RBC # BLD AUTO: 2.92 MILLION/UL (ref 3.81–5.12)
SARS-COV-2 RNA SPEC QL NAA+PROBE: NOT DETECTED
SODIUM SERPL-SCNC: 141 MMOL/L (ref 136–145)
UNIT DISPENSE STATUS: NORMAL
UNIT PRODUCT CODE: NORMAL
UNIT RH: NORMAL
WBC # BLD AUTO: 4.56 THOUSAND/UL (ref 4.31–10.16)

## 2020-04-02 PROCEDURE — 85025 COMPLETE CBC W/AUTO DIFF WBC: CPT | Performed by: INTERNAL MEDICINE

## 2020-04-02 PROCEDURE — 85018 HEMOGLOBIN: CPT | Performed by: NURSE PRACTITIONER

## 2020-04-02 PROCEDURE — 99233 SBSQ HOSP IP/OBS HIGH 50: CPT | Performed by: INTERNAL MEDICINE

## 2020-04-02 PROCEDURE — 80048 BASIC METABOLIC PNL TOTAL CA: CPT | Performed by: INTERNAL MEDICINE

## 2020-04-02 PROCEDURE — 99239 HOSP IP/OBS DSCHRG MGMT >30: CPT | Performed by: INTERNAL MEDICINE

## 2020-04-02 PROCEDURE — 94669 MECHANICAL CHEST WALL OSCILL: CPT

## 2020-04-02 PROCEDURE — 85014 HEMATOCRIT: CPT | Performed by: NURSE PRACTITIONER

## 2020-04-02 PROCEDURE — 82948 REAGENT STRIP/BLOOD GLUCOSE: CPT

## 2020-04-02 PROCEDURE — 94640 AIRWAY INHALATION TREATMENT: CPT

## 2020-04-02 PROCEDURE — 94760 N-INVAS EAR/PLS OXIMETRY 1: CPT

## 2020-04-02 RX ORDER — ALPRAZOLAM 0.5 MG/1
1 TABLET ORAL 2 TIMES DAILY PRN
Status: CANCELLED | OUTPATIENT
Start: 2020-04-02

## 2020-04-02 RX ORDER — PANTOPRAZOLE SODIUM 40 MG/1
40 TABLET, DELAYED RELEASE ORAL
Status: CANCELLED | OUTPATIENT
Start: 2020-04-03

## 2020-04-02 RX ORDER — BENZONATATE 100 MG/1
100 CAPSULE ORAL 3 TIMES DAILY PRN
Status: CANCELLED | OUTPATIENT
Start: 2020-04-02

## 2020-04-02 RX ORDER — HYDROMORPHONE HCL/PF 1 MG/ML
0.5 SYRINGE (ML) INJECTION EVERY 6 HOURS PRN
Status: CANCELLED | OUTPATIENT
Start: 2020-04-02

## 2020-04-02 RX ORDER — DRONABINOL 2.5 MG/1
5 CAPSULE ORAL
Status: CANCELLED | OUTPATIENT
Start: 2020-04-02

## 2020-04-02 RX ORDER — FERROUS SULFATE 325(65) MG
325 TABLET ORAL
Status: CANCELLED | OUTPATIENT
Start: 2020-04-03

## 2020-04-02 RX ORDER — GABAPENTIN 100 MG/1
100 CAPSULE ORAL 2 TIMES DAILY
Status: CANCELLED | OUTPATIENT
Start: 2020-04-02

## 2020-04-02 RX ORDER — LEVALBUTEROL 1.25 MG/.5ML
1.25 SOLUTION, CONCENTRATE RESPIRATORY (INHALATION)
Status: CANCELLED | OUTPATIENT
Start: 2020-04-02

## 2020-04-02 RX ORDER — ALBUTEROL SULFATE 90 UG/1
2 AEROSOL, METERED RESPIRATORY (INHALATION) EVERY 4 HOURS PRN
Status: CANCELLED | OUTPATIENT
Start: 2020-04-02

## 2020-04-02 RX ORDER — ATOVAQUONE 750 MG/5ML
1500 SUSPENSION ORAL DAILY
Status: CANCELLED | OUTPATIENT
Start: 2020-04-03

## 2020-04-02 RX ORDER — PREDNISONE 10 MG/1
10 TABLET ORAL DAILY
Status: CANCELLED | OUTPATIENT
Start: 2020-04-03

## 2020-04-02 RX ORDER — SODIUM CHLORIDE FOR INHALATION 0.9 %
3 VIAL, NEBULIZER (ML) INHALATION
Status: CANCELLED | OUTPATIENT
Start: 2020-04-02

## 2020-04-02 RX ORDER — MINOCYCLINE HYDROCHLORIDE 100 MG/1
100 CAPSULE ORAL 2 TIMES DAILY
Status: CANCELLED | OUTPATIENT
Start: 2020-04-02

## 2020-04-02 RX ORDER — TACROLIMUS 1 MG/1
3 CAPSULE ORAL
Status: CANCELLED | OUTPATIENT
Start: 2020-04-02

## 2020-04-02 RX ORDER — ACETAMINOPHEN 325 MG/1
650 TABLET ORAL EVERY 6 HOURS PRN
Status: CANCELLED | OUTPATIENT
Start: 2020-04-02

## 2020-04-02 RX ORDER — POLYETHYLENE GLYCOL 3350 17 G/17G
17 POWDER, FOR SOLUTION ORAL DAILY PRN
Status: CANCELLED | OUTPATIENT
Start: 2020-04-02

## 2020-04-02 RX ORDER — OXYCODONE HYDROCHLORIDE 10 MG/1
10 TABLET ORAL EVERY 4 HOURS PRN
Status: CANCELLED | OUTPATIENT
Start: 2020-04-02

## 2020-04-02 RX ADMIN — PANCRELIPASE 48000 UNITS: 24000; 76000; 120000 CAPSULE, DELAYED RELEASE PELLETS ORAL at 12:54

## 2020-04-02 RX ADMIN — OXYCODONE HYDROCHLORIDE 10 MG: 10 TABLET ORAL at 09:05

## 2020-04-02 RX ADMIN — PREDNISONE 10 MG: 10 TABLET ORAL at 09:05

## 2020-04-02 RX ADMIN — FERROUS SULFATE TAB 325 MG (65 MG ELEMENTAL FE) 325 MG: 325 (65 FE) TAB at 07:20

## 2020-04-02 RX ADMIN — MEROPENEM 1000 MG: 1 INJECTION, POWDER, FOR SOLUTION INTRAVENOUS at 00:57

## 2020-04-02 RX ADMIN — LEVALBUTEROL HYDROCHLORIDE 1.25 MG: 1.25 SOLUTION, CONCENTRATE RESPIRATORY (INHALATION) at 14:20

## 2020-04-02 RX ADMIN — ISODIUM CHLORIDE 3 ML: 0.03 SOLUTION RESPIRATORY (INHALATION) at 08:09

## 2020-04-02 RX ADMIN — MINOCYCLINE HYDROCHLORIDE 100 MG: 50 CAPSULE ORAL at 09:06

## 2020-04-02 RX ADMIN — MEROPENEM 1000 MG: 1 INJECTION, POWDER, FOR SOLUTION INTRAVENOUS at 09:05

## 2020-04-02 RX ADMIN — ISODIUM CHLORIDE 3 ML: 0.03 SOLUTION RESPIRATORY (INHALATION) at 14:20

## 2020-04-02 RX ADMIN — PANCRELIPASE 48000 UNITS: 24000; 76000; 120000 CAPSULE, DELAYED RELEASE PELLETS ORAL at 09:06

## 2020-04-02 RX ADMIN — TACROLIMUS 2.5 MG: 0.5 CAPSULE ORAL at 07:19

## 2020-04-02 RX ADMIN — ATOVAQUONE 1500 MG: 750 SUSPENSION ORAL at 09:05

## 2020-04-02 RX ADMIN — OXYCODONE HYDROCHLORIDE 10 MG: 10 TABLET ORAL at 05:02

## 2020-04-02 RX ADMIN — LEVALBUTEROL HYDROCHLORIDE 1.25 MG: 1.25 SOLUTION, CONCENTRATE RESPIRATORY (INHALATION) at 08:09

## 2020-04-02 RX ADMIN — HYDROMORPHONE HYDROCHLORIDE 0.5 MG: 1 INJECTION, SOLUTION INTRAMUSCULAR; INTRAVENOUS; SUBCUTANEOUS at 07:18

## 2020-04-02 RX ADMIN — HYDROMORPHONE HYDROCHLORIDE 0.5 MG: 1 INJECTION, SOLUTION INTRAMUSCULAR; INTRAVENOUS; SUBCUTANEOUS at 13:33

## 2020-04-02 RX ADMIN — GABAPENTIN 100 MG: 100 CAPSULE ORAL at 09:04

## 2020-04-02 RX ADMIN — PANTOPRAZOLE SODIUM 40 MG: 40 TABLET, DELAYED RELEASE ORAL at 05:02

## 2020-04-02 RX ADMIN — OXYCODONE HYDROCHLORIDE 10 MG: 10 TABLET ORAL at 16:01

## 2020-04-02 RX ADMIN — MAGNESIUM OXIDE TAB 400 MG (241.3 MG ELEMENTAL MG) 400 MG: 400 (241.3 MG) TAB at 09:05

## 2020-04-02 RX ADMIN — DRONABINOL 5 MG: 2.5 CAPSULE ORAL at 09:04

## 2020-04-02 RX ADMIN — HYDROMORPHONE HYDROCHLORIDE 0.5 MG: 1 INJECTION, SOLUTION INTRAMUSCULAR; INTRAVENOUS; SUBCUTANEOUS at 01:01

## 2020-04-02 RX ADMIN — INSULIN LISPRO 1 UNITS: 100 INJECTION, SOLUTION INTRAVENOUS; SUBCUTANEOUS at 07:21

## 2020-04-02 NOTE — PROGRESS NOTES
Called [facility] to give report  Was told they would call back  Phone number given  awaiting a call back a this time   Pt  time is [id number]

## 2020-04-02 NOTE — ASSESSMENT & PLAN NOTE
Hemoglobin 6 5 yesterday  No active bleeding  Given 1 unit PRBC transfusion  Hemoglobin 8 2 this morning    Resume Eliquis

## 2020-04-02 NOTE — SOCIAL WORK
Pt is Covid 19 negative  [facility] [address] [place] [address]  She explains that the [facility] is checking with their financial department prior to accepting pt  CM contacted [phone] to check on insurance auth for transportation  CM spoke to [facility] who states that no Donnamaria Hail is required under pt's insurance  Medical Nec done and placed in pt's chart

## 2020-04-02 NOTE — DISCHARGE SUMMARY
Discharge- Chaparrita Otero 1975, 45 y o  female MRN: [id number]    Unit/Bed#: -01 Encounter: [id number]    Primary Care Provider: Nabila Cardenas MD   Date and time admitted to hospital: 3/31/2020  7:24 AM        * Acute and chronic respiratory failure with hypoxia [facility])  Assessment & Plan  This is a 45years old female with history of bilateral lung transplant, recent history of intubation and mechanical ventilation, she was transferred to [place] for better management  She was on IV antibiotics and ECMO over there  She was discharged home on March 25th on 4 L oxygen  She was given minocycline for 1 month  Now this morning she was having more short of breath and hypoxia  As per patient her O2 sat was 40 at home  In the ER initially she was placed on 6 L oxygen and now currently back to for L  Saturating well  Chest x-ray showed right-sided infiltrate  Emergency room physician called her transplant center and discussed the case  Recommended to send COVID and monitor here  No need to transfer now  Patient been admitted for further management  Discussed personally with Pulmonary and Infectious Disease Service  Given her prolonged history an immunocompromised status, will start her on meropenem  Continue minocycline  Follow up the cultures  Her covid 19 came back negative  Discussed personally with [facility]  She would be transferred there for further management  Patient wants me to give a letter for work saying her covid test is negative  Letter was given  Anemia  Assessment & Plan  Hemoglobin 6 5 yesterday  No active bleeding  Given 1 unit PRBC transfusion  Hemoglobin 8 2 this morning  Resume Eliquis    Dvt femoral (deep venous thrombosis) (HCC)  Assessment & Plan  Diagnosed recently  Currently on Eliquis  Hemoglobin stable now  Resume    Chronic pain disorder  Assessment & Plan  Patient takes oxycodone at home  Continue  Also continue Dilaudid tito quezada  For breakthrough pain    HCAP (healthcare-associated pneumonia)  Assessment & Plan  Management as above    Pancreatic insufficiency  Assessment & Plan  Continue supplement    Hyperglycemia  Assessment & Plan  Continue sliding scale  Hemoglobin A1c 6 5    Cystic fibrosis (HCC)  Assessment & Plan  Status post lung transplant  Monitor closely    History of lung transplant [facility])  Assessment & Plan  Continue immunosuppression medications including prednisone and tacrolimus          Discharging Physician / Practitioner: Fabiola Grove MD  PCP: Li Veronica MD  Admission Date:   Admission Orders (From admission, onward)     Ordered        03/31/20 0915  Inpatient Admission  Once                   Discharge Date: 04/02/20    Resolved Problems  Date Reviewed: 4/2/2020    None          Consultations During Hospital Stay:  · Infectious Disease, Pulmonary    Procedures Performed:   · Chest x-ray    Significant Findings / Test Results:   · Chest x-ray  IMPRESSION:     Double lung transplant with worsening consolidation in the right lung, likely pneumonia      Chronic volume loss in the right lung with mediastinal shift to the right      The study was marked in EPIC for immediate notification        covid 19 negative    Incidental Findings:   ·      Test Results Pending at Discharge (will require follow up):   ·      Outpatient Tests Requested:  ·     Complications:      Reason for Admission:  Shortness of breath, hypoxia    Hospital Course:     HPI on admission  Kelly Loja is a 45 y o  female who presents with chest pain right-sided, shortness of breath and hypoxia  Patient has very long complicated history  She had history of lung transplant  Last month she was admitted here, was intubated and after that she was transferred to [facility]  Her hospital stay over there complicated with mechanical ventilation, ECMO, spontaneous pneumothorax  Eventually she was successfully extubated    Bronchoalveolar lavage was going Pseudomonas  She was given aggressive IV antibiotics  Discharged home on minocycline and she was discharged home on 4 L oxygen via nasal cannula  She was discharged home on March 25th  This morning she was complaining about short of breath and right-sided chest pain  Her O2 sat was 40 as per patient  In the emergency room her O2 sat was 83 initially, placed on oxygen at 5 L  Currently on 4 L and saturating well  Chest x-ray shows right-sided infiltrate, worsening  Emergency room physician contacted Transplant Center  No need for transfer at this point of time  Wants her to stay here and rule out COVID  I discussed personally with Infectious Disease and Pulmonary  Patient currently feels comfortable  No chest pain, headache, dizziness, abdominal pain, nausea or vomiting  Afebrile  Denies any contract history or recent travel except 2 fen     -hospital course  Patient was admitted and started on IV antibiotics  Evaluated by Infectious Disease and Pulmonary  She was placed on isolation for covid 19 test   Result came back negative  Discussed personally with her transplant physician in [facility]  Patient was accepted and she will be transferred for further management over there  She remains on 2 L oxygen currently  Hemodynamically stable now for transfer  Her hemoglobin dropped to 6 5 yesterday  No active bleeding  Given 1 unit PRBC transfusion  Hemoglobin 8 2 today  Eliquis resumed  Please see above list of diagnoses and related plan for additional information       Condition at Discharge: good     Discharge Day Visit / Exam:     Subjective:    Vitals: Blood Pressure: 115/73 (04/02/20 0726)  Pulse: 89 (04/02/20 0726)  Temperature: 98 8 °F (37 1 °C) (04/02/20 0726)  Temp Source: Oral (04/01/20 1047)  Respirations: 16 (04/01/20 2321)  Height: 5' 2 5" (158 8 cm) (03/31/20 0727)  Weight - Scale: 54 1 kg (119 lb 4 3 oz) (03/31/20 0727)  SpO2: 94 % (04/02/20 1252)  Exam: Physical Exam  General- Awake, alert and oriented x 3, looks comfortable  CVS- Normal S1/ [id number] system- B/L clear breath sounds  CNS- No acute focal neurologic deficit noted  Discussion with Family:     Discharge instructions/Information to patient and family:   See after visit summary for information provided to patient and family  Provisions for Follow-Up Care:  See after visit summary for information related to follow-up care and any pertinent home health orders  Disposition:     [address] for further management    For Discharges to [facility] SNF:   · Not Applicable to this Patient - Not Applicable to this Patient    Planned Readmission:      Discharge Statement:  I spent 35 minutes discharging the patient  This time was spent on the day of discharge  I had direct contact with the patient on the day of discharge  Greater than 50% of the total time was spent examining patient, answering all patient questions, arranging and discussing plan of care with patient as well as directly providing post-discharge instructions  Additional time then spent on discharge activities  Discharge Medications:  See after visit summary for reconciled discharge medications provided to patient and family        ** Please Note: This note has been constructed using a voice recognition system **

## 2020-04-02 NOTE — UTILIZATION REVIEW
Initial Clinical Review    Admission: Date/Time/Statement: Admission Orders (From admission, onward)     Ordered        03/31/20 0915  Inpatient Admission  Once                   Orders Placed This Encounter   Procedures    Inpatient Admission     Standing Status:   Standing     Number of Occurrences:   1     Order Specific Question:   Admitting Physician     Answer:   Lata Yeboah     Order Specific Question:   Level of Care     Answer:   Med Surg [16]     Order Specific Question:   Estimated length of stay     Answer:   More than 2 Midnights     Order Specific Question:   Certification     Answer:   I certify that inpatient services are medically necessary for this patient for a duration of greater than two midnights  See H&P and MD Progress Notes for additional information about the patient's course of treatment  ED Arrival Information     Expected Arrival Acuity Means of Arrival Escorted By Service Admission Type    - 3/31/2020 07:23 Emergent Ambulance [address]  [facility] General Medicine Emergency    Arrival Complaint    SOB        Chief Complaint   Patient presents with    Shortness of Breath     pt c/o increased SOB this morning  recent lung transplant and collapsed lung     Assessment/Plan: 44 yo female to ED from home w/ R sided CP , SOB and hypoxia   Hx of lung transplant   Last month admitted and intubated and transferred to  [facility]  Bronchoalveolar lavage was going Pseudomonas  She was given aggressive IV antibiotics  Discharged home on minocycline and she was discharged home on 4 L oxygen via nasal cannula  She was discharged home on March 25th  In Ed found to have sat 83 % placed on 5l   CXR showing R sided infiltrate worsening   Plan to r/u COVID   Admitted IP status w/ acute on chronic resp failure w/ hypoxia  Given her prolonged history an immunocompromised status, will start her on meropenem and possibly linezolid  Follow up the cultures  Maintain contact and airborne isolation  4/2 Pt is for transfer to [facility] for higher level of care        PE : diminished BS coarse BS   ED Triage Vitals [03/31/20 0727]   Temperature Pulse Respirations Blood Pressure SpO2   98 2 °F (36 8 °C) 102 19 110/62 (!) 83 %      Temp Source Heart Rate Source Patient Position - Orthostatic VS BP Location FiO2 (%)   Oral Monitor Sitting Right arm --      Pain Score       7        Wt Readings from Last 1 Encounters:   03/31/20 54 1 kg (119 lb 4 3 oz)     Additional Vital Signs:   04/02/20 0900            95 %  Nasal cannula     04/02/20 0824            95 %       04/02/20 0813              Nasal cannula     04/02/20 0809            96 %       04/02/20 07:26:53  98 8 °F (37 1 °C)  89    115/73  87  96 %       04/02/20 0500            97 %       04/02/20 0100              Nasal cannula     04/01/20 23:23:14  98 1 °F (36 7 °C)  89    112/71  85  96 %  Nasal cannula     04/01/20 23:21:42    88  16  112/71  85  96 %       04/01/20 2100              Nasal cannula     04/01/20 1942              Nasal cannula     04/01/20 1926            96 %  Nasal cannula     04/01/20 15:55:06    94  19  113/72  86  97 %       04/01/20 1530              Nasal cannula     04/01/20 1300  98 9 °F (37 2 °C)  86  18  111/71    98 %   Nasal cannula     SpO2: on 2lpm nasal cannula at 04/01/20 1300   04/01/20 1130              Nasal cannula     04/01/20 11:17:55  98 9 °F (37 2 °C)  87    109/69  82  97 %       04/01/20 10:47:46  99 4 °F (37 4 °C)  91  18  109/69  82  97 %       04/01/20 1037  98 9 °F (37 2 °C)  92  18  109/61    92 %       04/01/20 1030      18             04/01/20 10:22:40  98 9 °F (37 2 °C)  96  16  109/61  77  96 %       04/01/20 09:37:08  98 9 °F (37 2 °C)  99    116/62  80  96 %       04/01/20 0854            99 %   Nasal cannula     SpO2: on 2lpm nasal cannula at 04/01/20 0854   04/01/20 07:27:21  98 3 °F (36 8 °C)  85  17  113/70  84  98 %  None (Room air)     03/31/20 23:39:28  98 5 °F (36 9 °C)  85  16  100/64  76  99 %       03/31/20 2113            99 %  Nasal cannula     03/31/20 1900  98 2 °F (36 8 °C)  92  16  101/65  77  97 %  Nasal cannula  Lying   03/31/20 1700    86  16      99 %  Nasal cannula     03/31/20 1421            98 %  Nasal cannula     03/31/20 13:43:06  99 9 °F (37 7 °C)  90  17  98/64  75  99 %       03/31/20 1200    88  16  95/54  69  100 %  Nasal cannula  Sitting   03/31/20 1100    92  20  97/55  71  100 %  Nasal cannula  Sitting   03/31/20 1000    95  22  92/53  67  100 %  Nasal cannula  Sitting   03/31/20 0930    92  14  102/57  73  100 %  Nasal cannula  Sitting   03/31/20 0830    95  20  112/59  79  100 %  Nasal cannula  Sitting   03/31/20 0805              Nasal cannula     03/31/20 0731    96        99 %  Nasal cannula         Pertinent Labs/Diagnostic Test Results:   3/31 CXR - Double lung transplant with worsening consolidation in the right lung, likely pneumonia    Chronic volume loss in the right lung with mediastinal shift to the right    3/31 EKG - NSR   Results from last 7 days   Lab Units 04/02/20  0457 04/02/20  0109 04/01/20  1628 04/01/20  0527 04/01/20  0439 03/31/20  0751   WBC Thousand/uL 4 56  --   --   --  4 52 9 45   HEMOGLOBIN g/dL 8 2* 8 3* 8 3* 6 5* 6 6* 7 7*   HEMATOCRIT % 27 7* 27 9* 28 5* 22 7* 23 6* 26 4*   PLATELETS Thousands/uL 169  --   --   --  143* 184   NEUTROS ABS Thousands/µL 3 09  --   --   --  3 00 7 49     Results from last 7 days   Lab Units 04/02/20  0457 04/01/20  0439 03/31/20  0751   SODIUM mmol/L 141 141 141   POTASSIUM mmol/L 3 8 4 1 3 6   CHLORIDE mmol/L 105 108 106   CO2 mmol/L 31 27 29   ANION GAP mmol/L 5 6 6   BUN mg/dL 16 19 19   CREATININE mg/dL 0 81 0 86 0 91   EGFR ml/min/1 73sq m 90 84 78   CALCIUM mg/dL 8 5 7 9* 8 5   MAGNESIUM mg/dL  --   --  1 6     Results from last 7 days   Lab Units 04/01/20  0439 03/31/20  0751   AST U/L 133* 32   ALT U/L 137* 49   ALK PHOS U/L 145* 103   TOTAL PROTEIN g/dL 5 2* 6 0*   ALBUMIN g/dL 1 9* 2 3*   TOTAL BILIRUBIN mg/dL 0 20 0 30   BILIRUBIN DIRECT mg/dL  --  0 13     Results from last 7 days   Lab Units 04/02/20  0549 04/01/20  2140 04/01/20  1553 04/01/20  1113 04/01/20  0606 03/31/20  2046 03/31/20  1529   POC GLUCOSE mg/dl 162* 176* 333* 177* 175* 302* 151*     Results from last 7 days   Lab Units 04/02/20  0457 04/01/20  0439 03/31/20  0751   GLUCOSE RANDOM mg/dL 164* 141* 113         Results from last 7 days   Lab Units 04/01/20  0439   HEMOGLOBIN A1C % 6 6*   EAG mg/dl 143       Results from last 7 days   Lab Units 03/31/20  0751   TROPONIN I ng/mL <0 02     Results from last 7 days   Lab Units 03/31/20  0751   PROTIME seconds 16 4*   INR  1 31*   PTT seconds 30     Results from last 7 days   Lab Units 04/01/20  0439   PROCALCITONIN ng/ml <0 05     Results from last 7 days   Lab Units 04/01/20  0439 03/31/20  1415 03/31/20  0949 03/31/20  0751   LACTIC ACID mmol/L 1 4 1 3 2 3* 2 1*     Results from last 7 days   Lab Units 03/31/20  0919   CLARITY UA  Cloudy   COLOR UA  Brown   SPEC GRAV UA  1 020   PH UA  6 0   GLUCOSE UA mg/dl >=1000 (1%)*   KETONES UA mg/dl Negative   BLOOD UA  Large*   PROTEIN UA mg/dl Negative   NITRITE UA  Negative   BILIRUBIN UA  Negative   UROBILINOGEN UA E U /dl 0 2   LEUKOCYTES UA  Elevated glucose may cause decreased leukocyte values   See urine microscopic for [facility] result/*   WBC UA /hpf 4-10*   RBC UA /hpf Innumerable*   BACTERIA UA /hpf None Seen   EPITHELIAL CELLS WET PREP /hpf Occasional     Results from last 7 days   Lab Units 03/31/20  0801   INFLUENZA A PCR  None Detected   INFLUENZA B PCR  None Detected   RSV PCR  None Detected     Results from last 7 days   Lab Units 04/01/20  1501   SARS-COV-2  Not Detected     Results from last 7 days   Lab Units 04/01/20  1817 03/31/20  0915 03/31/20  0912   BLOOD CULTURE   --  No Growth at 24 hrs  No Growth at 24 hrs     GRAM STAIN RESULT  1+ Epithelial cells per low power field*  1+ Gram negative rods*  --   --      ED Treatment:   Medication Administration from 03/31/2020 0723 to 03/31/2020 1330       Date/Time Order Dose Route Action     03/31/2020 0751 sodium chloride 0 9 % bolus 1,000 mL 1,000 mL Intravenous New Bag     03/31/2020 0753 HYDROmorphone (DILAUDID) injection 0 5 mg 0 5 mg Intravenous Given     03/31/2020 0920 meropenem (MERREM) 1,000 mg in sodium chloride 0 9 % 100 mL IVPB 1,000 mg Intravenous New Bag     03/31/2020 0920 minocycline (MINOCIN) capsule 100 mg 100 mg Oral Given     03/31/2020 0953 HYDROmorphone (DILAUDID) injection 0 5 mg 0 5 mg Intravenous Given     03/31/2020 1038 tacrolimus (PROGRAF) capsule 2 5 mg 2 5 mg Oral Given     03/31/2020 1102 sodium chloride 0 9 % bolus 1,000 mL 1,000 mL Intravenous New Bag     03/31/2020 1313 oxyCODONE (ROXICODONE) IR tablet 5 mg 5 mg Oral Given        Past Medical History:   Diagnosis Date    Asthma     Chronic pain     Cystic fibrosis (HCC)     Kidney stone     PONV (postoperative nausea and vomiting)     Transplant recipient      Present on Admission:   Acute and chronic respiratory failure with hypoxia (HCC)   Pancreatic insufficiency   HCAP (healthcare-associated pneumonia)   Cystic fibrosis ([facility] [address] )   Hyperglycemia   Chronic pain disorder   Dvt femoral [facility]deep venous thrombosis) ([facility])   Anemia      Admitting Diagnosis: SOB (shortness of breath) [R06 02]  HCAP (healthcare-associated pneumonia) [J18 9]  Pneumonia involving right lung [J18 9]  Acute dyspnea [R06 00]  Age/Sex: 45 y o  female  Admission Orders:  Scheduled Medications:    Medications:  atovaquone 1,500 mg Oral Daily   dronabinol 5 mg Oral BID AC   ferrous sulfate 325 mg Oral Daily With Breakfast   gabapentin 100 mg Oral BID   insulin lispro 1-6 Units Subcutaneous TID AC   insulin lispro 1-6 Units Subcutaneous HS   levalbuterol 1 25 mg Nebulization TID magnesium oxide 400 mg Oral BID   meropenem 1,000 mg Intravenous Q8H   minocycline 100 mg Oral BID   pancrelipase (Lip-Prot-Amyl) 48,000 Units Oral TID With Meals   pantoprazole 40 mg Oral Early Morning   predniSONE 10 mg Oral Daily   sodium chloride 3 mL Nebulization TID   tacrolimus 2 5 mg Oral Daily With Breakfast   tacrolimus 3 mg Oral Daily With Dinner     Continuous IV Infusions:     PRN Meds:    acetaminophen 650 mg Oral Q6H PRN   albuterol 2 puff Inhalation Q4H PRN   ALPRAZolam 1 mg Oral BID PRN   benzonatate 100 mg Oral TID PRN   HYDROmorphone 0 5 mg Intravenous Q6H PRN  3/31 x1  4/1 x4  4/2 x2   oxyCODONE 10 mg Oral Q4H PRN   polyethylene glycol 17 g Oral Daily PRN     Tele   Aspiration precautions  IP CONSULT TO PULMONOLOGY  IP CONSULT TO INFECTIOUS DISEASES    Network Utilization Review Department  [email or web address]  ATTENTION: Please call with any questions or concerns to [phone] and carefully listen to the prompts so that you are directed to the right person  All voicemails are confidential   Fely Manrique all requests for admission clinical reviews, approved or denied determinations and any other requests to dedicated fax number below belonging to the campus where the patient is receiving treatment   List of dedicated fax numbers for the Facilities:  [address] [place] [address] DENIALS (Administrative/Medical Necessity) [phone]   [address] (Maternity/NICU/Pediatrics) [phone]   Coralee Roys [phone]   Arby Fillers [phone]   Nic Stack [phone]   [facility] [phone]   [facility] [address] [place] [address] [phone]   [facility]  [phone]   [address] [facility]  [address] [facility] [address] [facility] [phone]

## 2020-04-02 NOTE — PROGRESS NOTES
[facility] called  Updated on pt Hgb, Vitals, and patient condition  Also asking about results of COVID-19 testing  Still no results  Nurse stated she will call back later to see if there is any further changes

## 2020-04-02 NOTE — TRANSPORTATION MEDICAL NECESSITY
Section I - General Information    Name of Patient: Agus Marcial                 : 1975    Medicare #: [id number]  Transport Date: 20 (PCS is valid for round trips on this date and for all repetitive trips in the 60-day range as noted below )  Origin: [address] [place] [address]  Is the pt's stay covered under Medicare Part A (PPS/DRG)   []     Closest appropriate facility? If no, why is transport to more distant facility required? Yes  If hospice pt, is this transport related to pt's terminal illness? No       Section II - Medical Necessity Questionnaire  Ambulance transportation is medically necessary only if other means of transport are contraindicated or would be potentially harmful to the patient  To meet this requirement, the patient must either be "bed confined" or suffer from a condition such that transport by means other than ambulance is contraindicated by the patient's condition  The following questions must be answered by the medical professional signing below for this form to be valid:    1)  Describe the MEDICAL CONDITION (physical and/or mental) of this patient AT [address] [place] [address] that requires the patient to be transported in an ambulance and why transport by other means is contraindicated by the patient's condition:hx-b/l lung transplant, acute and chronic respiratory failure, dyspnea, HCAP, hypoxia    2) Is the patient "bed confined" as defined below? Yes  To be "be confined" the patient must satisfy all three of the following conditions: (1) unable to get up from bed without Assistance; AND (2) unable to ambulate; AND (3) unable to sit in a chair or wheelchair  3) Can this patient safely be transported by car or wheelchair van (i e , seated during transport without a medical attendant or monitoring)?    No    4) In addition to completing questions 1-3 above, please check any of the following conditions that apply*:   *Note: supporting documentation for any boxes checked must be maintained in the patient's medical records  If hosp-hosp transfer, describe services needed at 2nd facility not available at 1st facility? Medical attendant required   Requires oxygen-unable to self administer  Unable to tolerate seated position for time needed to transport       Section III - Signature of Physician or Healthcare Professional  I certify that the above information is true and correct based on my evaluation of this patient, and represent that the patient requires transport by ambulance and that other forms of transport are contraindicated  I understand that this information will be used by the Centers for Medicare and Medicaid Services (CMS) to support the determination of medical necessity for ambulance services, and I represent that I have personal knowledge of the patient's condition at time of transport  []  If this box is checked, I also certify that the patient is physically or mentally incapable of signing the ambulance service's claim and that the institution with which I am affiliated has furnished care, services, or assistance to the patient  My signature below is made on behalf of the patient pursuant to 42 CFR §424 36(b)(4)  In accordance with 42 CFR §424 37, the specific reason(s) that the patient is physically or mentally incapable of signing the claim form is as follows: n/a        Signature of Physician* or Healthcare Professional______________________________________________________________  Signature Date 04/02/20 (For scheduled repetitive transports, this form is not valid for transports performed more than 60 days after this date)    Printed Name & Credentials of Physician or Healthcare Professional (MD, DO, RN, etc )___Isabela Hay RN_____________________________  *Form must be signed by patient's attending physician for scheduled, repetitive transports   For non-repetitive, unscheduled ambulance transports, if unable to obtain the signature of the attending physician, any of the following may sign (choose appropriate option below)  [] Physician Assistant []  Clinical Nurse Specialist []  Registered Nurse  []  Nurse Practitioner  [x] Discharge Planner

## 2020-04-02 NOTE — PLAN OF CARE
Problem: PAIN - ADULT  Goal: Verbalizes/displays adequate comfort level or baseline comfort level  Description  Interventions:  - Encourage patient to monitor pain and request assistance  - Assess pain using appropriate pain scale  - Administer analgesics based on type and severity of pain and evaluate response  - Implement non-pharmacological measures as appropriate and evaluate response  - Consider cultural and social influences on pain and pain management  - Notify physician/advanced practitioner if interventions unsuccessful or patient reports new pain  Outcome: Progressing     Problem: INFECTION - ADULT  Goal: Absence or prevention of progression during hospitalization  Description  INTERVENTIONS:  - Assess and monitor for signs and symptoms of infection  - Monitor lab/diagnostic results  - Monitor all insertion sites, i e  indwelling lines, tubes, and drains  - Monitor endotracheal if appropriate and nasal secretions for changes in amount and color  - [place] appropriate cooling/warming therapies per order  - Administer medications as ordered  - Instruct and encourage patient and family to use good hand hygiene technique  - Identify and instruct in appropriate isolation precautions for identified infection/condition  Outcome: Progressing     Problem: SAFETY ADULT  Goal: Patient will remain free of falls  Description  INTERVENTIONS:  - Assess patient frequently for physical needs  -  Identify cognitive and physical deficits and behaviors that affect risk of falls    -  [place] fall precautions as indicated by assessment   - Educate patient/family on patient safety including physical limitations  - Instruct patient to call for assistance with activity based on assessment  - Modify environment to reduce risk of injury  - Consider OT/PT consult to assist with strengthening/mobility  Outcome: Progressing  Goal: Maintain or return to baseline ADL function  Description  INTERVENTIONS:  -  Assess patient's ability to carry out ADLs; assess patient's baseline for ADL function and identify physical deficits which impact ability to perform ADLs (bathing, care of mouth/teeth, toileting, grooming, dressing, etc )  - Assess/evaluate cause of self-care deficits   - Assess range of motion  - Assess patient's mobility; develop plan if impaired  - Assess patient's need for assistive devices and provide as appropriate  - Encourage maximum independence but intervene and supervise when necessary  - Involve family in performance of ADLs  - Assess for home care needs following discharge   - Consider OT consult to assist with ADL evaluation and planning for discharge  - Provide patient education as appropriate  Outcome: Progressing  Goal: Maintain or return mobility status to optimal level  Description  INTERVENTIONS:  - Assess patient's baseline mobility status (ambulation, transfers, stairs, etc )    - Identify cognitive and physical deficits and behaviors that affect mobility  - Identify mobility aids required to assist with transfers and/or ambulation (gait belt, sit-to-stand, lift, walker, cane, etc )  - [place] fall precautions as indicated by assessment  - Record patient progress and toleration of activity level on Mobility SBAR; progress patient to next Phase/Stage  - Instruct patient to call for assistance with activity based on assessment  - Consider rehabilitation consult to assist with strengthening/weightbearing, etc   Outcome: Progressing     Problem: DISCHARGE PLANNING  Goal: Discharge to home or other facility with appropriate resources  Description  INTERVENTIONS:  - Identify barriers to discharge w/patient and caregiver  - Arrange for needed discharge resources and transportation as appropriate  - Identify discharge learning needs (meds, wound care, etc )  - Arrange for interpretive services to assist at discharge as needed  - Refer to Case Management Department for coordinating discharge planning if the patient needs post-hospital services based on physician/advanced practitioner order or complex needs related to functional status, cognitive ability, or social support system  Outcome: Progressing     Problem: Knowledge Deficit  Goal: Patient/family/caregiver demonstrates understanding of disease process, treatment plan, medications, and discharge instructions  Description  Complete learning assessment and assess knowledge base  Interventions:  - Provide teaching at level of understanding  - Provide teaching via preferred learning methods  Outcome: Progressing     Problem: Potential for Falls  Goal: Patient will remain free of falls  Description  INTERVENTIONS:  - Assess patient frequently for physical needs  -  Identify cognitive and physical deficits and behaviors that affect risk of falls    -  [place] fall precautions as indicated by assessment   - Educate patient/family on patient safety including physical limitations  - Instruct patient to call for assistance with activity based on assessment  - Modify environment to reduce risk of injury  - Consider OT/PT consult to assist with strengthening/mobility  Outcome: Progressing

## 2020-04-02 NOTE — PROGRESS NOTES
Progress Note - Infectious Disease   Rafael Fortune 45 y o  female MRN: [id number]  Unit/Bed#: -01 Encounter: [id number]      Impression/Recommendations:  1  Pneumonia, likely bacterial etiology  Patient has history of Pseudomonas and MRSA pneumonia  She is on minocycline suppression  Meropenem was started in ER  Patient is clinically improving  Will continue IV meropenem  COVID PCR is now negative  Continue IV meropenem  Continue p o  minocycline suppression  Monitor temperature/WBC  Monitor respiratory symptoms closely        2  Acute on chronic hypoxic respiratory failure, multifactorial   Above pneumonia plays a role  Patient is clinically stable  No evidence of respiratory distress clinically  Antibiotic plan as in above  O2 support per primary service      3  CF, 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 any time  I spent 45 minutes on the unit floor, of which 25 minutes were spent in counseling and coordination of care, as outlined above  Antibiotics:  Meropenem # 3  Minocycline    Subjective:  Patient was seen earlier  She feels better  Dyspnea has improved  Cough improved  Temperature stays down  No chills  She is tolerating antibiotic wells  No nausea, vomiting or diarrhea      Objective:  Vitals:  Temp:  [98 1 °F (36 7 °C)-98 9 °F (37 2 °C)] 98 8 °F (37 1 °C)  HR:  [86-94] 89  Resp:  [16-19] 16  BP: (111-115)/(71-73) 115/73  SpO2:  [95 %-98 %] 95 %  Temp (24hrs), Av 6 °F (37 °C), Min:98 1 °F (36 7 °C), Max:98 9 °F (37 2 °C)  Current: Temperature: 98 8 °F (37 1 °C)    Physical Exam:    Parts of the exam were were done by the Medicine service and discussed with me, due to the infection control issues related to the current COVID crisis  General: Awake, alert, cooperative, no distress  Lungs: Expansion symmetric, no rales, no wheezing, respirations unlabored  Heart:  Regular rate and rhythm, S1 and S2 normal, no murmur  Abdomen: Soft, nondistended, non-tender, bowel sounds active all four quadrants, no masses, no organomegaly  Extremities: No edema  No erythema/warmth  No ulcer  Nontender to palpation  Skin:  No rash  Neuro: Moves all extremities  Invasive Devices     Central Venous Catheter Line            Port A Cath Right Chest -- days          Drain            Ureteral Drain/Stent Right ureter 6 Fr  57 days                Labs studies:   I have personally reviewed pertinent labs  Results from last 7 days   Lab Units 04/02/20  0457 04/01/20  0439 03/31/20  0751   POTASSIUM mmol/L 3 8 4 1 3 6   CHLORIDE mmol/L 105 108 106   CO2 mmol/L 31 27 29   BUN mg/dL 16 19 19   CREATININE mg/dL 0 81 0 86 0 91   EGFR ml/min/1 73sq m 90 84 78   CALCIUM mg/dL 8 5 7 9* 8 5   AST U/L  --  133* 32   ALT U/L  --  137* 49   ALK PHOS U/L  --  145* 103     Results from last 7 days   Lab Units 04/02/20  0457 04/02/20  0109 04/01/20  1628  04/01/20  0439 03/31/20  0751   WBC Thousand/uL 4 56  --   --   --  4 52 9 45   HEMOGLOBIN g/dL 8 2* 8 3* 8 3*   < > 6 6* 7 7*   PLATELETS Thousands/uL 169  --   --   --  143* 184    < > = values in this interval not displayed  Results from last 7 days   Lab Units 04/01/20  1817 03/31/20  0915 03/31/20  0912   BLOOD CULTURE   --  No Growth at 48 hrs  No Growth at 48 hrs     SPUTUM CULTURE  Culture too young- will reincubate  --   --    GRAM STAIN RESULT  1+ Epithelial cells per low power field*  1+ Gram negative rods*  --   --        Imaging Studies:   I have personally reviewed pertinent imaging study reports and images in PACS     EKG, Pathology, and Other Studies:   I have personally reviewed pertinent reports

## 2020-04-02 NOTE — QUICK NOTE
Was paged by nursing staff regarding patient's Prograf  Documented that patient received Prograf at 2030 today, however this was an error and patient is now requesting Prograf at this time  Rescheduled Prograf 3 mg for now, 2131  Monitor       Wang Thompson PA-C

## 2020-04-03 ENCOUNTER — TRANSITIONAL CARE MANAGEMENT (OUTPATIENT)
Dept: FAMILY MEDICINE CLINIC | Facility: CLINIC | Age: 45
End: 2020-04-03

## 2020-04-03 NOTE — UTILIZATION REVIEW
Notification of Discharge  This is a Notification of Discharge from our facility [address]  Please be advised that this patient has been discharge from our facility  Below you will find the admission and discharge date and time including the patients disposition  PRESENTATION DATE: 3/31/2020  7:24 AM  OBS ADMISSION DATE:   IP ADMISSION DATE: 3/31/20 0915   DISCHARGE DATE: 4/2/2020  4:27 PM  DISPOSITION: Admitted as an inpatient to this hospital Admitted as an inpatient to this hospital   Admission Orders listed below:  Admission Orders (From admission, onward)     Ordered        03/31/20 0915  Inpatient Admission  Once                   Please contact the UR Department if additional information is required to close this patient's authorization/case  [address] [place] Utilization Review Department  Main: [phone] x carefully listen to the prompts  All voicemails are confidential   [email or web address]  Send all requests for admission clinical reviews, approved or denied determinations and any other requests to dedicated fax number below belonging to the campus where the patient is receiving treatment   List of dedicated fax numbers:  [address] DENIALS (Administrative/Medical Necessity) [phone]   [address] (Maternity/NICU/Pediatrics) [phone]   Lonzell Golas [phone]   Whit Counts [phone]   Mireille Loud [phone]   [address] [phone]   [facility]  [phone]   [address] [facility] [address]  [address] [address] [address] [facility] [phone]

## 2020-04-05 LAB
BACTERIA BLD CULT: NORMAL
BACTERIA BLD CULT: NORMAL

## 2020-04-06 LAB
BACTERIA SPT RESP CULT: ABNORMAL
GRAM STN SPEC: ABNORMAL
GRAM STN SPEC: ABNORMAL

## 2020-04-13 ENCOUNTER — TELEPHONE (OUTPATIENT)
Dept: FAMILY MEDICINE CLINIC | Facility: CLINIC | Age: 45
End: 2020-04-13

## 2020-04-13 DIAGNOSIS — E84.9 CYSTIC FIBROSIS (HCC): Primary | ICD-10-CM

## 2020-04-13 RX ORDER — ALPRAZOLAM 0.5 MG/1
0.5 TABLET ORAL 2 TIMES DAILY PRN
Qty: 60 TABLET | Refills: 0 | Status: SHIPPED | OUTPATIENT
Start: 2020-04-13 | End: 2020-11-05

## 2020-05-13 DIAGNOSIS — E84.9 CYSTIC FIBROSIS (HCC): Primary | ICD-10-CM

## 2020-05-15 RX ORDER — ALPRAZOLAM 1 MG/1
TABLET ORAL
Qty: 180 TABLET | Refills: 1 | Status: SHIPPED | OUTPATIENT
Start: 2020-05-15 | End: 2020-11-05 | Stop reason: SDUPTHER

## 2020-05-19 RX ORDER — MORPHINE SULFATE 15 MG/1
15 TABLET, FILM COATED, EXTENDED RELEASE ORAL 2 TIMES DAILY
COMMUNITY
Start: 2020-04-15 | End: 2020-11-05

## 2020-07-15 ENCOUNTER — TELEPHONE (OUTPATIENT)
Dept: UROLOGY | Facility: CLINIC | Age: 45
End: 2020-07-15

## 2020-07-15 DIAGNOSIS — N20.0 NEPHROLITHIASIS: Primary | ICD-10-CM

## 2020-07-15 NOTE — TELEPHONE ENCOUNTER
Call to pt to r/s from recall list - needs new order for US retroperitoneal (one in Epic   )- asking for us to schedule and call her back

## 2020-09-09 ENCOUNTER — TELEPHONE (OUTPATIENT)
Dept: UROLOGY | Facility: CLINIC | Age: 45
End: 2020-09-09

## 2020-09-09 NOTE — TELEPHONE ENCOUNTER
A message was left with patient informing her appointment for 9/11 at 11:15am in the [facility] office with Susana Dial will need to be canceled due to her not having her KUB or US done ptv  I left our call back number for her to reschedule  I also left the number for central scheduling for her to schedule the US

## 2020-09-14 DIAGNOSIS — J98.4 OTHER DISORDERS OF LUNG: ICD-10-CM

## 2020-09-15 RX ORDER — DRONABINOL 5 MG/1
CAPSULE ORAL
Qty: 90 CAPSULE | Refills: 0 | Status: SHIPPED | OUTPATIENT
Start: 2020-09-15 | End: 2020-11-05 | Stop reason: SDUPTHER

## 2020-09-24 ENCOUNTER — APPOINTMENT (EMERGENCY)
Dept: CT IMAGING | Facility: HOSPITAL | Age: 45
End: 2020-09-24
Payer: COMMERCIAL

## 2020-09-24 ENCOUNTER — HOSPITAL ENCOUNTER (EMERGENCY)
Facility: HOSPITAL | Age: 45
Discharge: OTHER INSTITUTION | End: 2020-09-24
Attending: EMERGENCY MEDICINE | Admitting: EMERGENCY MEDICINE
Payer: COMMERCIAL

## 2020-09-24 ENCOUNTER — APPOINTMENT (EMERGENCY)
Dept: RADIOLOGY | Facility: HOSPITAL | Age: 45
End: 2020-09-24
Payer: COMMERCIAL

## 2020-09-24 VITALS
WEIGHT: 108 LBS | TEMPERATURE: 99.8 F | SYSTOLIC BLOOD PRESSURE: 144 MMHG | HEIGHT: 63 IN | BODY MASS INDEX: 19.14 KG/M2 | HEART RATE: 76 BPM | RESPIRATION RATE: 15 BRPM | DIASTOLIC BLOOD PRESSURE: 92 MMHG | OXYGEN SATURATION: 100 %

## 2020-09-24 DIAGNOSIS — R09.02 HYPOXIA: ICD-10-CM

## 2020-09-24 DIAGNOSIS — R06.00 DYSPNEA: ICD-10-CM

## 2020-09-24 DIAGNOSIS — T86.812 LUNG TRANSPLANT INFECTION (HCC): ICD-10-CM

## 2020-09-24 DIAGNOSIS — E83.42 HYPOMAGNESEMIA: ICD-10-CM

## 2020-09-24 DIAGNOSIS — D72.819 LEUKOPENIA: ICD-10-CM

## 2020-09-24 DIAGNOSIS — R50.9 FEVER: ICD-10-CM

## 2020-09-24 DIAGNOSIS — J18.9 PNEUMONIA INVOLVING LEFT LUNG: Primary | ICD-10-CM

## 2020-09-24 LAB
ALBUMIN SERPL BCP-MCNC: 3.4 G/DL (ref 3.5–5)
ALP SERPL-CCNC: 76 U/L (ref 46–116)
ALT SERPL W P-5'-P-CCNC: 35 U/L (ref 12–78)
ANION GAP SERPL CALCULATED.3IONS-SCNC: 12 MMOL/L (ref 4–13)
APTT PPP: 34 SECONDS (ref 23–37)
AST SERPL W P-5'-P-CCNC: 38 U/L (ref 5–45)
ATRIAL RATE: 97 BPM
BASE EX.OXY STD BLDV CALC-SCNC: 96.5 % (ref 60–80)
BASE EXCESS BLDV CALC-SCNC: -2.2 MMOL/L
BASOPHILS # BLD MANUAL: 0 THOUSAND/UL (ref 0–0.1)
BASOPHILS NFR MAR MANUAL: 0 % (ref 0–1)
BILIRUB DIRECT SERPL-MCNC: 0.09 MG/DL (ref 0–0.2)
BILIRUB SERPL-MCNC: 0.4 MG/DL (ref 0.2–1)
BUN SERPL-MCNC: 32 MG/DL (ref 5–25)
CALCIUM SERPL-MCNC: 9.4 MG/DL (ref 8.3–10.1)
CHLORIDE SERPL-SCNC: 100 MMOL/L (ref 100–108)
CO2 SERPL-SCNC: 23 MMOL/L (ref 21–32)
CREAT SERPL-MCNC: 1.27 MG/DL (ref 0.6–1.3)
EOSINOPHIL # BLD MANUAL: 0.01 THOUSAND/UL (ref 0–0.4)
EOSINOPHIL NFR BLD MANUAL: 1 % (ref 0–6)
ERYTHROCYTE [DISTWIDTH] IN BLOOD BY AUTOMATED COUNT: 13.4 % (ref 11.6–15.1)
GFR SERPL CREATININE-BSD FRML MDRD: 52 ML/MIN/1.73SQ M
GLUCOSE SERPL-MCNC: 216 MG/DL (ref 65–140)
HCG SERPL QL: NEGATIVE
HCO3 BLDV-SCNC: 21.3 MMOL/L (ref 24–30)
HCT VFR BLD AUTO: 35.1 % (ref 34.8–46.1)
HGB BLD-MCNC: 11 G/DL (ref 11.5–15.4)
INR PPP: 1.35 (ref 0.84–1.19)
LACTATE SERPL-SCNC: 1.3 MMOL/L (ref 0.5–2)
LYMPHOCYTES # BLD AUTO: 0.21 THOUSAND/UL (ref 0.6–4.47)
LYMPHOCYTES # BLD AUTO: 16 % (ref 14–44)
MAGNESIUM SERPL-MCNC: 1.4 MG/DL (ref 1.6–2.6)
MCH RBC QN AUTO: 28 PG (ref 26.8–34.3)
MCHC RBC AUTO-ENTMCNC: 31.3 G/DL (ref 31.4–37.4)
MCV RBC AUTO: 89 FL (ref 82–98)
MONOCYTES # BLD AUTO: 0.06 THOUSAND/UL (ref 0–1.22)
MONOCYTES NFR BLD: 5 % (ref 4–12)
NEUTROPHILS # BLD MANUAL: 1.01 THOUSAND/UL (ref 1.85–7.62)
NEUTS BAND NFR BLD MANUAL: 9 % (ref 0–8)
NEUTS SEG NFR BLD AUTO: 69 % (ref 43–75)
NRBC BLD AUTO-RTO: 0 /100 WBCS
NT-PROBNP SERPL-MCNC: 243 PG/ML
O2 CT BLDV-SCNC: 16.7 ML/DL
P AXIS: 69 DEGREES
PCO2 BLDV: 32.5 MM HG (ref 42–50)
PH BLDV: 7.43 [PH] (ref 7.3–7.4)
PLATELET # BLD AUTO: 211 THOUSANDS/UL (ref 149–390)
PLATELET BLD QL SMEAR: ADEQUATE
PMV BLD AUTO: 9.5 FL (ref 8.9–12.7)
PO2 BLDV: 112 MM HG (ref 35–45)
POTASSIUM SERPL-SCNC: 4.5 MMOL/L (ref 3.5–5.3)
PR INTERVAL: 136 MS
PROT SERPL-MCNC: 8.2 G/DL (ref 6.4–8.2)
PROTHROMBIN TIME: 16 SECONDS (ref 11.6–14.5)
QRS AXIS: 83 DEGREES
QRSD INTERVAL: 70 MS
QT INTERVAL: 340 MS
QTC INTERVAL: 431 MS
RBC # BLD AUTO: 3.93 MILLION/UL (ref 3.81–5.12)
SARS-COV-2 RNA RESP QL NAA+PROBE: NEGATIVE
SODIUM SERPL-SCNC: 135 MMOL/L (ref 136–145)
T WAVE AXIS: 70 DEGREES
TOTAL CELLS COUNTED SPEC: 100
TROPONIN I SERPL-MCNC: <0.02 NG/ML
VENTRICULAR RATE: 97 BPM
WBC # BLD AUTO: 1.29 THOUSAND/UL (ref 4.31–10.16)

## 2020-09-24 PROCEDURE — 96361 HYDRATE IV INFUSION ADD-ON: CPT

## 2020-09-24 PROCEDURE — 80048 BASIC METABOLIC PNL TOTAL CA: CPT | Performed by: EMERGENCY MEDICINE

## 2020-09-24 PROCEDURE — 87040 BLOOD CULTURE FOR BACTERIA: CPT | Performed by: EMERGENCY MEDICINE

## 2020-09-24 PROCEDURE — 83880 ASSAY OF NATRIURETIC PEPTIDE: CPT | Performed by: EMERGENCY MEDICINE

## 2020-09-24 PROCEDURE — 96375 TX/PRO/DX INJ NEW DRUG ADDON: CPT

## 2020-09-24 PROCEDURE — G1004 CDSM NDSC: HCPCS

## 2020-09-24 PROCEDURE — 85027 COMPLETE CBC AUTOMATED: CPT | Performed by: EMERGENCY MEDICINE

## 2020-09-24 PROCEDURE — 82805 BLOOD GASES W/O2 SATURATION: CPT | Performed by: EMERGENCY MEDICINE

## 2020-09-24 PROCEDURE — 80076 HEPATIC FUNCTION PANEL: CPT | Performed by: EMERGENCY MEDICINE

## 2020-09-24 PROCEDURE — 93005 ELECTROCARDIOGRAM TRACING: CPT

## 2020-09-24 PROCEDURE — 93010 ELECTROCARDIOGRAM REPORT: CPT | Performed by: INTERNAL MEDICINE

## 2020-09-24 PROCEDURE — 96365 THER/PROPH/DIAG IV INF INIT: CPT

## 2020-09-24 PROCEDURE — 36415 COLL VENOUS BLD VENIPUNCTURE: CPT | Performed by: EMERGENCY MEDICINE

## 2020-09-24 PROCEDURE — 71275 CT ANGIOGRAPHY CHEST: CPT

## 2020-09-24 PROCEDURE — 84145 PROCALCITONIN (PCT): CPT | Performed by: EMERGENCY MEDICINE

## 2020-09-24 PROCEDURE — 84484 ASSAY OF TROPONIN QUANT: CPT | Performed by: EMERGENCY MEDICINE

## 2020-09-24 PROCEDURE — 87635 SARS-COV-2 COVID-19 AMP PRB: CPT | Performed by: EMERGENCY MEDICINE

## 2020-09-24 PROCEDURE — 99285 EMERGENCY DEPT VISIT HI MDM: CPT

## 2020-09-24 PROCEDURE — 99285 EMERGENCY DEPT VISIT HI MDM: CPT | Performed by: EMERGENCY MEDICINE

## 2020-09-24 PROCEDURE — 85730 THROMBOPLASTIN TIME PARTIAL: CPT | Performed by: EMERGENCY MEDICINE

## 2020-09-24 PROCEDURE — 83735 ASSAY OF MAGNESIUM: CPT | Performed by: EMERGENCY MEDICINE

## 2020-09-24 PROCEDURE — 85610 PROTHROMBIN TIME: CPT | Performed by: EMERGENCY MEDICINE

## 2020-09-24 PROCEDURE — 85007 BL SMEAR W/DIFF WBC COUNT: CPT | Performed by: EMERGENCY MEDICINE

## 2020-09-24 PROCEDURE — 71045 X-RAY EXAM CHEST 1 VIEW: CPT

## 2020-09-24 PROCEDURE — 83605 ASSAY OF LACTIC ACID: CPT | Performed by: EMERGENCY MEDICINE

## 2020-09-24 PROCEDURE — 96367 TX/PROPH/DG ADDL SEQ IV INF: CPT

## 2020-09-24 PROCEDURE — 84703 CHORIONIC GONADOTROPIN ASSAY: CPT | Performed by: EMERGENCY MEDICINE

## 2020-09-24 RX ORDER — LINEZOLID 2 MG/ML
600 INJECTION, SOLUTION INTRAVENOUS ONCE
Status: COMPLETED | OUTPATIENT
Start: 2020-09-24 | End: 2020-09-24

## 2020-09-24 RX ORDER — IPRATROPIUM BROMIDE AND ALBUTEROL SULFATE 2.5; .5 MG/3ML; MG/3ML
3 SOLUTION RESPIRATORY (INHALATION) ONCE
Status: COMPLETED | OUTPATIENT
Start: 2020-09-24 | End: 2020-09-24

## 2020-09-24 RX ORDER — HYDROMORPHONE HCL/PF 1 MG/ML
1 SYRINGE (ML) INJECTION ONCE
Status: COMPLETED | OUTPATIENT
Start: 2020-09-24 | End: 2020-09-24

## 2020-09-24 RX ORDER — LEVOFLOXACIN 5 MG/ML
750 INJECTION, SOLUTION INTRAVENOUS ONCE
Status: DISCONTINUED | OUTPATIENT
Start: 2020-09-24 | End: 2020-09-24

## 2020-09-24 RX ORDER — MAGNESIUM SULFATE HEPTAHYDRATE 40 MG/ML
2 INJECTION, SOLUTION INTRAVENOUS ONCE
Status: COMPLETED | OUTPATIENT
Start: 2020-09-24 | End: 2020-09-24

## 2020-09-24 RX ORDER — HYDROMORPHONE HCL/PF 1 MG/ML
0.5 SYRINGE (ML) INJECTION ONCE
Status: COMPLETED | OUTPATIENT
Start: 2020-09-24 | End: 2020-09-24

## 2020-09-24 RX ORDER — LIDOCAINE 50 MG/G
1 PATCH TOPICAL ONCE
Status: DISCONTINUED | OUTPATIENT
Start: 2020-09-24 | End: 2020-09-25 | Stop reason: HOSPADM

## 2020-09-24 RX ORDER — MORPHINE SULFATE 4 MG/ML
4 INJECTION, SOLUTION INTRAMUSCULAR; INTRAVENOUS ONCE
Status: COMPLETED | OUTPATIENT
Start: 2020-09-24 | End: 2020-09-24

## 2020-09-24 RX ADMIN — IPRATROPIUM BROMIDE AND ALBUTEROL SULFATE 3 ML: 2.5; .5 SOLUTION RESPIRATORY (INHALATION) at 19:16

## 2020-09-24 RX ADMIN — SODIUM CHLORIDE 1000 ML: 0.9 INJECTION, SOLUTION INTRAVENOUS at 19:17

## 2020-09-24 RX ADMIN — LIDOCAINE 5% 1 PATCH: 700 PATCH TOPICAL at 19:16

## 2020-09-24 RX ADMIN — HYDROMORPHONE HYDROCHLORIDE 0.5 MG: 1 INJECTION, SOLUTION INTRAMUSCULAR; INTRAVENOUS; SUBCUTANEOUS at 21:06

## 2020-09-24 RX ADMIN — MAGNESIUM SULFATE IN WATER 2 G: 40 INJECTION, SOLUTION INTRAVENOUS at 20:15

## 2020-09-24 RX ADMIN — MEROPENEM 2000 MG: 1 INJECTION, POWDER, FOR SOLUTION INTRAVENOUS at 21:48

## 2020-09-24 RX ADMIN — MORPHINE SULFATE 4 MG: 4 INJECTION INTRAVENOUS at 19:16

## 2020-09-24 RX ADMIN — LINEZOLID 600 MG: 600 INJECTION, SOLUTION INTRAVENOUS at 20:15

## 2020-09-24 RX ADMIN — HYDROMORPHONE HYDROCHLORIDE 1 MG: 1 INJECTION, SOLUTION INTRAMUSCULAR; INTRAVENOUS; SUBCUTANEOUS at 23:08

## 2020-09-24 RX ADMIN — IOHEXOL 85 ML: 350 INJECTION, SOLUTION INTRAVENOUS at 20:29

## 2020-09-24 NOTE — ED PROVIDER NOTES
History  Chief Complaint   Patient presents with    Fever - 9 weeks to 74 years     Pt is a lung transplant pt and spiked a fever of 105 at home  Pt took tylenol for the fever at about 3pm  Pt also has SOB that started this afternoon      Patient is a 45-year-old female with past medical and surgical history significant for cystic fibrosis status post bilateral lung transplant, history of rejection and pneumonia requiring mechanical ventilation and ECMO in March of 2020, history of DVT and PE while on ECMO and currently on Eliquis, GERD, diabetes secondary to chronic prednisone use, presents to the emergency department for high fever, cough, dyspnea and generally not feeling well  Patient reports that this afternoon she started feeling unwell with generalized body aches and felt warm to touch  When she her temperature was 104 8° F  She took Tylenol and then rechecked her temperature and it was still 102° F  She called her transplant coordinator at [address], Nina Dukes (nurse practitioner), who encouraged her to go to the nearest ED  Patient also reports that her oxygen level at home was in the 70s and she was wearing 4 L O2 via nasal cannula that she has on a PRN basis  Patient does report productive cough as well as left-sided pleuritic pain  She denies any headache, dizziness or near syncope, neck pain or stiffness, URI symptoms, palpitations, abdominal pain, nausea, vomiting, diarrhea, constipation, dysuria, change in urinary frequency, hematuria, flank pain, skin rash or color change, extremity swelling or pain, lateralizing extremity weakness or paresthesia or other focal neurologic deficits  Patient compliant with her medications  History provided by:  Patient and medical records   used: No        Prior to Admission Medications   Prescriptions Last Dose Informant Patient Reported? Taking?    ALPRAZolam (XANAX) 0 5 mg tablet   No No   Sig: Take 1 tablet (0 5 mg total) by mouth 2 (two) times a day as needed for anxiety   ALPRAZolam (XANAX) 1 mg tablet   No No   Sig: TAKE 1 TABLET BY MOUTH TWICE A DAY   Cannabinoids (THC FREE) 20 MG/ML LIQD   Yes No   Sig: Take by mouth   Multiple Vitamin (MULTIVITAMIN) tablet   Yes No   Sig: Take 1 tablet by mouth daily   albuterol (ACCUNEB) 1 25 MG/3ML nebulizer solution  Self Yes No   Sig: Take 1 ampule by nebulization every 4 (four) hours as needed for wheezing   alendronate (FOSAMAX) 70 mg tablet   Yes No   Sig: Take 70 mg by mouth every 7 days   apixaban (ELIQUIS) 5 mg   Yes No   Sig: Take 10 mg by mouth 2 (two) times a day   atovaquone (MEPRON) 750 mg/5 mL suspension   Yes No   Sig: Take 1,500 mg by mouth daily   colistimethate (COLY-MYCIN M) inhalation solution   Yes No   Sig: Take 150 mg by nebulization every 12 (twelve) hours   dexlansoprazole (DEXILANT) 60 MG capsule   Yes No   Sig: Take 60 mg by mouth daily   dronabinol (MARINOL) 5 MG capsule   No No   Sig: TAKE 1 CAPSULE BY MOUTH THREE TIMES A DAY   ferrous sulfate 325 (65 Fe) mg tablet   Yes No   Sig: Take 325 mg by mouth daily with breakfast   gabapentin (NEURONTIN) 100 mg capsule   Yes No   Sig: Take 100 mg by mouth 2 (two) times a day as needed   insulin aspart (NovoLOG) 100 units/mL injection   Yes No   Sig: Inject under the skin 3 (three) times a day before meals   magnesium oxide (MAG-OX) 400 mg   Yes No   Sig: Take 400 mg by mouth 2 (two) times a day   minocycline (DYNACIN) 100 MG tablet   Yes No   Sig: Take 100 mg by mouth 2 (two) times a day   morphine (MS CONTIN) 15 mg 12 hr tablet   Yes No   Sig: Take 15 mg by mouth 2 (two) times a day   oxyCODONE (ROXICODONE) 5 mg immediate release tablet   Yes No   Sig: Take 10 mg by mouth every 4 (four) hours as needed for moderate pain   pancrelipase, Lip-Prot-Amyl, (CREON) 24,000 units   Yes No   Sig: Take 48,000 Units by mouth 3 (three) times a day with meals   pancrelipase, Lip-Prot-Amyl, (CREON) 24,000 units   Yes No Sig: Take 24,000 units of lipase by mouth 3 (three) times a day with meals   polyethylene glycol (MIRALAX) 17 g packet   Yes No   Sig: Take 17 g by mouth 2 (two) times a day as needed   predniSONE 10 mg tablet   Yes No   Sig: Take 10 mg by mouth daily   tacrolimus (PROGRAF) 5 mg capsule   Yes No   Sig: Take 2 5 mg by mouth daily with breakfast   tacrolimus (PROGRAF) 5 mg capsule   Yes No   Sig: Take 3 mg by mouth daily with dinner      Facility-Administered Medications: None       Past Medical History:   Diagnosis Date    Asthma     Chronic pain     [facility]     [facility]     GERD (gastroesophageal reflux disease)     HBP (high blood pressure)     Kidney stone     PONV (postoperative nausea and vomiting)     Transplant recipient     Lung- 2013, 2017       Past Surgical History:   Procedure Laterality Date    APPENDECTOMY      BREAST SURGERY      Aug      BRONCHOSCOPY      CHOLECYSTECTOMY      COLONOSCOPY      EGD  07/2019    Yeast Infect   FL RETROGRADE PYELOGRAM  1/9/2020    FL RETROGRADE PYELOGRAM  2/4/2020    GASTROSTOMY TUBE, PLACE      LUNG SURGERY      LUNG TRANSPLANT      X2    AR CYSTO/URETERO W/LITHOTRIPSY &INDWELL STENT INSRT Right 2/4/2020    Procedure: CYSTOSCOPY URETEROSCOPY WITH LITHOTRIPSY HOLMIUM LASER, RETROGRADE PYELOGRAM AND INSERTION STENT URETERAL;  Surgeon: Alisa Fall MD;  Location: [facility];  Service: Urology    AR CYSTOURETHROSCOPY,URETER CATHETER Right 1/9/2020    Procedure: CYSTOSCOPY RETROGRADE PYELOGRAM WITH INSERTION STENT URETERAL;  Surgeon: Alisa Fall MD;  Location: [facility];  Service: Urology       Family History   Problem Relation Age of Onset    Alcohol abuse Mother     Mental illness Mother     Alcohol abuse Father     COPD Maternal Grandmother     COPD Maternal Grandfather     COPD Paternal Grandmother      I have reviewed and agree with the history as documented      E-Cigarette/Vaping    E-Cigarette Use Never User      E-Cigarette/Vaping Substances     Social History     Tobacco Use    Smoking status: Former Smoker     Packs/day: 0 50     Years: 12 00     Pack years: 6 00    Smokeless tobacco: Never Used   Substance Use Topics    Alcohol use: Yes     Frequency: 2-4 times a month     Drinks per session: 1 or 2     Binge frequency: Never     Comment: social    Drug use: Yes     Frequency: 2 0 times per week     Types: Marijuana     Comment: medical edible marijuana prescribed       Review of Systems   Constitutional: Positive for chills, fatigue and fever  Negative for appetite change  HENT: Negative for congestion, ear pain, rhinorrhea and sore throat  Respiratory: Positive for cough and shortness of breath  Negative for chest tightness and wheezing  Cardiovascular: Positive for chest pain  Negative for palpitations and leg swelling  Gastrointestinal: Negative for abdominal pain, blood in stool, constipation, diarrhea, nausea and vomiting  Genitourinary: Negative for dysuria, flank pain, frequency and hematuria  Musculoskeletal: Negative for back pain, neck pain and neck stiffness  +Generalized body aches  Skin: Negative for color change and rash  Allergic/Immunologic: Negative for immunocompromised state  Neurological: Negative for dizziness, syncope, weakness, light-headedness, numbness and headaches  Hematological: Negative for adenopathy  Does not bruise/bleed easily  Psychiatric/Behavioral: Negative for confusion and decreased concentration  All other systems reviewed and are negative  Physical Exam  Physical Exam  Vitals signs and nursing note reviewed  Constitutional:       General: She is not in acute distress  Appearance: Normal appearance  She is well-developed  She is not ill-appearing, toxic-appearing or diaphoretic  HENT:      Head: Normocephalic and atraumatic        Right Ear: External ear normal       Left Ear: External ear normal  Nose: Nose normal       Mouth/Throat:      Mouth: Mucous membranes are moist       Pharynx: Oropharynx is clear  No oropharyngeal exudate  Eyes:      Extraocular Movements: Extraocular movements intact  Pupils: Pupils are equal, round, and reactive to light  Neck:      Musculoskeletal: Normal range of motion and neck supple  No neck rigidity  Vascular: No JVD  Cardiovascular:      Rate and Rhythm: Regular rhythm  Tachycardia present  Pulses: Normal pulses  Heart sounds: Normal heart sounds  No murmur  No friction rub  No gallop  Pulmonary:      Effort: Pulmonary effort is normal  No respiratory distress  Breath sounds: No stridor  No wheezing, rhonchi or rales  Comments: Patient satting 88% on room air  She was placed on 3 L nasal cannula and improved to 96%  Coarse breath sounds throughout  Chest:      Chest wall: No tenderness  Abdominal:      General: Bowel sounds are normal  There is no distension  Palpations: Abdomen is soft  Tenderness: There is no abdominal tenderness  There is no guarding or rebound  Musculoskeletal: Normal range of motion  General: No swelling or tenderness  Lymphadenopathy:      Cervical: No cervical adenopathy  Skin:     General: Skin is warm and dry  Coloration: Skin is not pale  Findings: No erythema or rash  Neurological:      General: No focal deficit present  Mental Status: She is alert and oriented to person, place, and time  Sensory: No sensory deficit  Motor: No weakness     Psychiatric:         Mood and Affect: Mood normal          Behavior: Behavior normal          Vital Signs  ED Triage Vitals   Temperature Pulse Respirations Blood Pressure SpO2   09/24/20 1830 09/24/20 1830 09/24/20 1830 09/24/20 1830 09/24/20 1830   99 8 °F (37 7 °C) 105 22 155/83 91 %      Temp Source Heart Rate Source Patient Position - Orthostatic VS BP Location FiO2 (%)   09/24/20 1830 09/24/20 1830 09/24/20 1830 09/24/20 1830 --   Oral Monitor Sitting Left arm       Pain Score       09/24/20 1916       7         Vitals:    09/24/20 2045 09/24/20 2149 09/24/20 2200 09/24/20 2308   BP: 116/69 139/89 148/95 144/92   BP Location: Left arm Left arm Left arm Left arm   Pulse: 84 82 87 76   Resp: 15 15 15 15   Temp:       TempSrc:       SpO2: 98% 97% 96% 100%   Weight:       Height:           Visual Acuity      ED Medications  Medications   lidocaine (LIDODERM) 5 % patch 1 patch (1 patch Topical Medication Applied 9/24/20 1916)   sodium chloride 0 9 % bolus 1,000 mL (0 mL Intravenous Stopped 9/24/20 2015)   morphine (PF) 4 mg/mL injection 4 mg (4 mg Intravenous Given 9/24/20 1916)   ipratropium-albuterol (DUO-NEB) 0 5-2 5 mg/3 mL inhalation solution 3 mL (3 mL Nebulization Given 9/24/20 1916)   meropenem (MERREM) 2,000 mg in sodium chloride 0 9 % 100 mL IVPB (0 mg Intravenous Stopped 9/24/20 2308)   linezolid (ZYVOX) IVPB (premix in dextrose) 600 mg 300 mL (0 mg Intravenous Stopped 9/24/20 2145)   magnesium sulfate 2 g/50 mL IVPB (premix) 2 g (0 g Intravenous Stopped 9/24/20 2145)   iohexol (OMNIPAQUE) 350 MG/ML injection (MULTI-DOSE) 85 mL (85 mL Intravenous Given 9/24/20 2029)   HYDROmorphone (DILAUDID) injection 0 5 mg (0 5 mg Intravenous Given 9/24/20 2106)   HYDROmorphone (DILAUDID) injection 1 mg (1 mg Intravenous Given 9/24/20 2308)       Diagnostic Studies  Results Reviewed     Procedure Component Value Units Date/Time    Novel Coronavirus Sara [facility]AR [facility] [[id number]]  (Normal) Collected:  09/24/20 1911    Lab Status:  Final result Specimen:  Nares from Nose Updated:  09/24/20 2018     SARS-CoV-2 Negative    Narrative: The specimen collection materials, transport medium, and/or testing methodology utilized in the production of these test results have been proven to be reliable in a limited validation with an abbreviated program under the Emergency Utilization Authorization provided by the FDA    Testing reported as "Presumptive positive" will be confirmed with secondary testing with a reference laboratory to ensure result accuracy  Clinical caution and judgement should be used with the interpretation of these results with consideration of the clinical impression and other laboratory testing  Testing reported as "Positive" or "Negative" has been proven to be accurate according to standard laboratory validation requirements  All testing is performed with control materials showing appropriate reactivity at standard intervals  CBC and differential [[id number]]  (Abnormal) Collected:  09/24/20 1911    Lab Status:  Final result Specimen:  Blood from Hand, Right Updated:  09/24/20 2010     WBC 1 29 Thousand/uL      RBC 3 93 Million/uL      Hemoglobin 11 0 g/dL      Hematocrit 35 1 %      MCV 89 fL      MCH 28 0 pg      MCHC 31 3 g/dL      RDW 13 4 %      MPV 9 5 fL      Platelets 964 Thousands/uL      nRBC 0 /100 WBCs     Blood culture #1 [[id number]] Collected:  09/24/20 1951    Lab Status:   In process Specimen:  Blood from Central Venous Line Updated:  09/24/20 1954    Hepatic function panel [[id number]]  (Abnormal) Collected:  09/24/20 1911    Lab Status:  Final result Specimen:  Blood from Hand, Right Updated:  09/24/20 1948     Total Bilirubin 0 40 mg/dL      Bilirubin, Direct 0 09 mg/dL      Alkaline Phosphatase 76 U/L      AST 38 U/L      ALT 35 U/L      Total Protein 8 2 g/dL      Albumin 3 4 g/dL     hCG, qualitative pregnancy [[id number]]  (Normal) Collected:  09/24/20 1911    Lab Status:  Final result Specimen:  Blood from Hand, Right Updated:  09/24/20 1948     Preg, Serum Negative    Basic metabolic panel [[id number]]  (Abnormal) Collected:  09/24/20 1911    Lab Status:  Final result Specimen:  Blood from Hand, Right Updated:  09/24/20 1948     Sodium 135 mmol/L      Potassium 4 5 mmol/L      Chloride 100 mmol/L      CO2 23 mmol/L      ANION GAP 12 mmol/L      BUN 32 mg/dL      Creatinine 1 27 mg/dL      Glucose 216 mg/dL      Calcium 9 4 mg/dL      eGFR 52 ml/min/1 73sq m     Narrative:       [place] guidelines for Chronic Kidney Disease (CKD):     Stage 1 with normal or high GFR (GFR > 90 mL/min/1 73 square meters)    Stage 2 Mild CKD (GFR = 60-89 mL/min/1 73 square meters)    Stage 3A Moderate CKD (GFR = 45-59 mL/min/1 73 square meters)    Stage 3B Moderate CKD (GFR = 30-44 mL/min/1 73 square meters)    Stage 4 Severe CKD (GFR = 15-29 mL/min/1 73 square meters)    Stage 5 End Stage CKD (GFR <15 mL/min/1 73 square meters)  Note: GFR calculation is accurate only with a steady state creatinine    Magnesium [[id number]]  (Abnormal) Collected:  09/24/20 1911    Lab Status:  Final result Specimen:  Blood from Hand, Right Updated:  09/24/20 1948     Magnesium 1 4 mg/dL     NT-BNP PRO [[id number]]  (Abnormal) Collected:  09/24/20 1911    Lab Status:  Final result Specimen:  Blood from Hand, Right Updated:  09/24/20 1948     NT-proBNP 243 pg/mL     Lactic acid [[id number]]  (Normal) Collected:  09/24/20 1911    Lab Status:  Final result Specimen:  Blood from Hand, Right Updated:  09/24/20 1942     LACTIC ACID 1 3 mmol/L     Narrative:       Result may be elevated if tourniquet was used during collection      Troponin I [[id number]]  (Normal) Collected:  09/24/20 1911    Lab Status:  Final result Specimen:  Blood from Hand, Right Updated:  09/24/20 1942     Troponin I <0 02 ng/mL     Protime-INR [[id number]]  (Abnormal) Collected:  09/24/20 1911    Lab Status:  Final result Specimen:  Blood from Hand, Right Updated:  09/24/20 1942     Protime 16 0 seconds      INR 1 35    APTT [[id number]]  (Normal) Collected:  09/24/20 1911    Lab Status:  Final result Specimen:  Blood from Hand, Right Updated:  09/24/20 1942     PTT 34 seconds     Blood gas, venous [[id number]]  (Abnormal) Collected:  09/24/20 1911    Lab Status:  Final result Specimen:  Blood from Hand, Right Updated:  09/24/20 1919     pH, Melvin 7 434 pCO2, Melvin 32 5 mm Hg      pO2, Melvin 112 0 mm Hg      HCO3, Melvin 21 3 mmol/L      Base Excess, Melvin -2 2 mmol/L      O2 Content, Melvin 16 7 ml/dL      O2 HGB, VENOUS 96 5 %     Procalcitonin with AM Reflex [[id number]] Collected:  09/24/20 1911    Lab Status: In process Specimen:  Blood from Hand, Right Updated:  09/24/20 1917    Blood culture #2 [[id number]] Collected:  09/24/20 1911    Lab Status: In process Specimen:  Blood from Hand, Right Updated:  09/24/20 1917                 CTA ED chest PE study   Final Result by Padmini Khan MD (09/24 2048)      Hazy areas of infiltrate throughout the left lung in keeping with multilobar pneumonia  The study was marked in [facility] for immediate notification  Workstation performed: [id number]         XR chest 1 view portable   ED Interpretation by Joyce Li DO (09/24 2323)   Partial right lung collapse and mediastinal shift to the left, not significantly changed from prior chest x-rays  Possible infiltrate left side  Procedures  ECG 12 Lead Documentation Only    Date/Time: 9/24/2020 8:26 PM  Performed by: Joyce Li DO  Authorized by: Joyce Li DO     ECG reviewed by me, the ED Provider: yes    Patient location:  ED  Previous ECG:     Previous ECG:  Compared to current    Comparison ECG info:  3-    Similarity:  No change  Interpretation:     Interpretation: normal    Rate:     ECG rate:  97    ECG rate assessment: normal    Rhythm:     Rhythm: sinus rhythm    Ectopy:     Ectopy: none    QRS:     QRS axis:  Normal    QRS intervals:  Normal  Conduction:     Conduction: normal    ST segments:     ST segments:  Normal  T waves:     T waves: normal               ED Course  ED Course as of Sep 24 2324   Thu Sep 24, 2020   1847 Patient provided me with office number for her lung transplant  at [facility] ([phone])  Spoke with  from this number and they are paging her  36 Spoke with nurse practitioner from [facility], Elijah who recommended transfer to [facility] given that patient has a tendency to decompensate very quickly  She looked at prior sputum cultures and Levaquin is a good starting option  She gave me the phone number for excepting physician, Dr Jemima Summers  Will contact PACS for transfer  36 Spoke with P[facility] who is waiting on [facility] center  2059 Spoke with Dr Sergei Gaytan who recommended based on prior sputum cultures starting linezolid and meropenem  2239 Updated by patient access that the only available transport to [place] during the night is by flight  Patient is agreeable to fly and states she has been flown down to [facility] in the past                           Initial Sepsis Screening     [address] Name 09/24/20 2027                Is the patient's history suggestive of a new or worsening infection? (!) Yes (Proceed)  -MA        Suspected source of infection  pneumonia  -MA        Are two or more of the following signs & symptoms of infection both present and new to the patient? (!) Yes (Proceed)  -MA        Indicate SIRS criteria  Leukopenia (WBC < 4000 IJL); Tachycardia > 90 bpm  -MA        If the answer is yes to both questions, suspicion of sepsis is present          If severe sepsis is present AND tissue hypoperfusion perists in the hour after fluid resuscitation or lactate > 4, the patient meets criteria for SEPTIC SHOCK          Are any of the following organ dysfunction criteria present within 6 hours of suspected infection and SIRS criteria that are NOT considered to be chronic conditions?   No  -MA        Organ dysfunction          Date of presentation of severe sepsis          Time of presentation of severe sepsis          Tissue hypoperfusion persists in the hour after crystalloid fluid administration, evidenced, by either:          Was hypotension present within one hour of the conclusion of crystalloid fluid administration? No  -MA        Date of presentation of septic shock          Time of presentation of septic shock            User Key  (r) = Recorded By, (t) = Taken By, (c) = Cosigned By    Initials Name Provider Type    ZEENAT Kennedy DO Physician          SBIRT 22yo+      Most Recent Value   SBIRT (24 yo +)   In order to provide better care to our patients, we are screening all of our patients for alcohol and drug use  Would it be okay to ask you these screening questions? Yes Filed at: 09/24/2020 1922   Initial Alcohol Screen: US AUDIT-C    1  How often do you have a drink containing alcohol?  0 Filed at: 09/24/2020 1922   2  How many drinks containing alcohol do you have on a typical day you are drinking? 0 Filed at: 09/24/2020 1922   3a  Male UNDER 65: How often do you have five or more drinks on one occasion? 0 Filed at: 09/24/2020 1922   3b  FEMALE Any Age, or MALE 65+: How often do you have 4 or more drinks on one occassion? 0 Filed at: 09/24/2020 1922   Audit-C Score  0 Filed at: 09/24/2020 1922   NICOLE: How many times in the past year have you    Used an illegal drug or used a prescription medication for non-medical reasons?   Never Filed at: 09/24/2020 Sabrina Dodd' Criteria for PE      Most Recent Value   Wells' Criteria for PE   Clinical signs and symptoms of DVT  0 Filed at: 09/24/2020 2027   PE is primary diagnosis or equally likely  3 Filed at: 09/24/2020 2027   HR >100  0 Filed at: 09/24/2020 2027   Immobilization at least 3 days or Surgery in the previous 4 weeks  0 Filed at: 09/24/2020 2027   Previous, objectively diagnosed PE or DVT  1 5 Filed at: 09/24/2020 2027   Hemoptysis  0 Filed at: 09/24/2020 2027   Malignancy with treatment within 6 months or palliative  0 Filed at: 09/24/2020 2027   Bob Fox' Criteria Total  4 5 Filed at: 09/24/2020 2027              MDM  Number of Diagnoses or Management Options  Dyspnea:   Fever:   Hypomagnesemia:   Hypoxia:   Leukopenia:   Lung transplant infection [facility]):   Pneumonia:   Diagnosis management comments: 79-year-old female with history of cystic fibrosis, asthma status post lung transplant on immunosuppressive therapy presents for high fever, cough, shortness of breath, generalized body aches that started today  Patient also noted to be hypoxic on room air requiring supplemental oxygen  Most likely patient has recurrent pneumonia, possible transplant rejection  Will do full cardiac and septic workup, contact patient's lung transplant coordinator  Will start IV Levaquin (allergies to vancomycin and cephalosporin)  Will give neb treatment, IV fluids and morphine for pain         Amount and/or Complexity of Data Reviewed  Clinical lab tests: ordered and reviewed  Tests in the radiology section of CPT®: ordered and reviewed  Tests in the medicine section of CPT®: ordered and reviewed  Review and summarize past medical records: yes  Discuss the patient with other providers: yes  Independent visualization of images, tracings, or specimens: yes        Disposition  Final diagnoses:   Fever   Dyspnea   Hypoxia   Lung transplant infection ([facility] ) - chronic immunocompromise   Hypomagnesemia   Leukopenia   Pneumonia involving left lung     Time reflects when diagnosis was documented in both MDM as applicable and the Disposition within this note     Time User Action Codes Description Comment    9/24/2020  8:13 PM Everlene Sprain E Add [R50 9] Fever     9/24/2020  8:13 PM Everlene Sprain E Add [R06 00] Dyspnea     9/24/2020  8:13 PM Everlene Sprain E Add [R09 02] Hypoxia     9/24/2020  8:13 PM Everlene Sprain E Add [J18 9] Pneumonia     9/24/2020  8:13 PM Everlene Sprain E Add [T86 812] [facility]     9/24/2020  8:13 PM Everlene Sprain E Modify [Q52 849] [facility] chronic immunocompromise    9/24/2020  8:29 PM Everlene Sprain E Add [E83 42] Hypomagnesemia     9/24/2020  8:29 PM Everlene Sprain E Add [D72 819] Leukopenia 9/24/2020  8:58 PM Debramber Monaco E Add [J18 9] Pneumonia involving left lung     9/24/2020  8:58 PM Debroah Jacinda E Remove [J18 9] Pneumonia     9/24/2020  8:58 PM Debroah Jacinda E Modify [R50 9] Fever     9/24/2020  8:58 PM Debroah Jacinda E Modify [J18 9] Pneumonia involving left lung       ED Disposition     ED Disposition Condition Date/Time Comment    Transfer to Another [facility] Sep 24, 2020  8:13 PM Dea Guerrier should be transferred out to [facility] under accepting physician, Dr Elly Washington MD Documentation      Most Recent Value   Patient Condition  The patient has been stabilized such that within reasonable medical probability, no material deterioration of the patient condition or the condition of the unborn child(joseph) is likely to result from the transfer   Reason for Transfer  Level of Care needed not available at this facility   Benefits of Transfer  Specialized equipment and/or services available at the receiving facility (Include comment)________________________, Continuity of care [Lung transplant specialist]   Risks of Transfer  Potential for delay in receiving treatment, Loss of IV, Potential deterioration of medical condition, Increased discomfort during transfer, Possible worsening of condition or death during transfer   Accepting Physician  Dr Terry Guo Name, Leopoldo Neil [facility]   Toro Fry Shows   Provider Certification  General risk, such as traffic hazards, adverse weather conditions, rough terrain or turbulence, possible failure of equipment (including vehicle or aircraft), or consequences of actions of persons outside the control of the transport personnel, Risk of worsening condition, Unanticipated needs of medical equipment and personnel during transport, The possibility of a transport vehicle being unavailable      RN Documentation      Most Recent Value   Accepting Facility Name, [address], [facility]      Follow-up Information    None         Patient's Medications   Discharge Prescriptions    No medications on file     No discharge procedures on file      PDMP Review     None          ED Provider  Electronically Signed by           Emory Rausch DO  09/24/20 5657

## 2020-09-25 LAB — PROCALCITONIN SERPL-MCNC: 0.05 NG/ML

## 2020-09-25 NOTE — EMTALA/ACUTE CARE TRANSFER
[address] [facility] [address]  [address]  [facility] [address]  Dept: [phone]      EMTALA TRANSFER CONSENT    NAME Sebas Fortune                                         1975                              MRN [id number]    I have been informed of my rights regarding examination, treatment, and transfer   by Dr Alin Sandoval DO    Benefits: Specialized equipment and/or services available at the receiving facility (Include comment)________________________, Continuity of care(Lung transplant specialist)    Risks: Potential for delay in receiving treatment, Loss of IV, Potential deterioration of medical condition, Increased discomfort during transfer, Possible worsening of condition or death during transfer      Consent for Transfer:  I acknowledge that my medical condition has been evaluated and explained to me by the emergency department physician or other qualified medical person and/or my attending physician, who has recommended that I be transferred to the service of  Accepting Physician: Dr Noah Saucedo at [address] [place] [address] Rd Name, [address] : [facility], [id number] [place] [address]  The above potential benefits of such transfer, the potential risks associated with such transfer, and the probable risks of not being transferred have been explained to me, and I fully understand them  The doctor has explained that, in my case, the benefits of transfer outweigh the risks  I agree to be transferred  I authorize the performance of emergency medical procedures and treatments upon me in both transit and upon arrival at the receiving facility  Additionally, I authorize the release of any and all medical records to the receiving facility and request they be transported with me, if possible  I understand that the safest mode of transportation during a medical emergency is an ambulance and that the Hospital advocates the use of this mode of transport  Risks of traveling to the receiving facility by car, including absence of medical control, life sustaining equipment, such as oxygen, and medical personnel has been explained to me and I fully understand them  (JUSTIN CORRECT BOX BELOW)  [  ]  I consent to the stated transfer and to be transported by ambulance/helicopter  [  ]  I consent to the stated transfer, but refuse transportation by ambulance and accept full responsibility for my transportation by car  I understand the risks of non-ambulance transfers and I exonerate the Hospital and its staff from any deterioration in my condition that results from this refusal     X___________________________________________    DATE  20  TIME________  Signature of patient or legally responsible individual signing on patient behalf           RELATIONSHIP TO PATIENT_________________________          Provider Certification    NAME Patricia Quinonez                                        [facility] 1975                              MRN [id number]    A medical screening exam was performed on the above named patient  Based on the examination:    Condition Necessitating Transfer The primary encounter diagnosis was Fever  Diagnoses of Dyspnea, Hypoxia, Pneumonia, and Lung transplant infection ([facility] [address] ) were also pertinent to this visit      Patient Condition: The patient has been stabilized such that within reasonable medical probability, no material deterioration of the patient condition or the condition of the unborn child(joseph) is likely to result from the transfer    Reason for Transfer: Level of Care needed not available at this facility    Transfer Requirements: [address], [place] CHILDRESS   · Space available and qualified personnel available for treatment as acknowledged by    · Agreed to accept transfer and to provide appropriate medical treatment as acknowledged by       Dr Elle Dorantes  · Appropriate medical records of the examination and treatment of the patient are provided at the time of transfer   [facility], [address] _______  · Transfer will be performed by qualified personnel from    and appropriate transfer equipment as required, including the use of necessary and appropriate life support measures  Provider Certification: I have examined the patient and explained the following risks and benefits of being transferred/refusing transfer to the patient/family:  General risk, such as traffic hazards, adverse weather conditions, rough terrain or turbulence, possible failure of equipment (including vehicle or aircraft), or consequences of actions of persons outside the control of the transport personnel, Risk of worsening condition, Unanticipated needs of medical equipment and personnel during transport, The possibility of a transport vehicle being unavailable      Based on these reasonable risks and benefits to the patient and/or the unborn child(joseph), and based upon the information available at the time of the patients examination, I certify that the medical benefits reasonably to be expected from the provision of appropriate medical treatments at another medical facility outweigh the increasing risks, if any, to the individuals medical condition, and in the case of labor to the unborn child, from effecting the transfer      X____________________________________________ DATE 09/24/20        TIME_______      ORIGINAL - SEND TO MEDICAL RECORDS   COPY - SEND WITH PATIENT DURING TRANSFER

## 2020-09-25 NOTE — ED NOTES
Report called to Kat Patino RN in [facility]  Informed pt that helicopter is here for transport and will leave shortly  Report given to flight SUSIE Olivera RN  09/24/20 1214

## 2020-09-25 NOTE — ED NOTES
[facility] Bed #[id number]  Accepting- Dr Roberto Castorena 3 ~ 25 minutes  [address], RN  09/24/20 5824

## 2020-09-30 LAB
BACTERIA BLD CULT: NORMAL
BACTERIA BLD CULT: NORMAL

## 2020-11-05 ENCOUNTER — TELEMEDICINE (OUTPATIENT)
Dept: FAMILY MEDICINE CLINIC | Facility: CLINIC | Age: 45
End: 2020-11-05
Payer: COMMERCIAL

## 2020-11-05 DIAGNOSIS — M81.0 OSTEOPOROSIS, UNSPECIFIED OSTEOPOROSIS TYPE, UNSPECIFIED PATHOLOGICAL FRACTURE PRESENCE: ICD-10-CM

## 2020-11-05 DIAGNOSIS — E84.9 CYSTIC FIBROSIS (HCC): ICD-10-CM

## 2020-11-05 DIAGNOSIS — Z94.2 HISTORY OF LUNG TRANSPLANT (HCC): ICD-10-CM

## 2020-11-05 DIAGNOSIS — Z00.00 MEDICARE ANNUAL WELLNESS VISIT, SUBSEQUENT: ICD-10-CM

## 2020-11-05 DIAGNOSIS — K86.89 PANCREATIC INSUFFICIENCY: Primary | ICD-10-CM

## 2020-11-05 DIAGNOSIS — G89.4 CHRONIC PAIN DISORDER: ICD-10-CM

## 2020-11-05 DIAGNOSIS — Z79.52 LONG TERM CURRENT USE OF SYSTEMIC STEROIDS: ICD-10-CM

## 2020-11-05 DIAGNOSIS — J98.4 OTHER DISORDERS OF LUNG: ICD-10-CM

## 2020-11-05 DIAGNOSIS — Z12.31 ENCOUNTER FOR SCREENING MAMMOGRAM FOR BREAST CANCER: ICD-10-CM

## 2020-11-05 DIAGNOSIS — I82.519 CHRONIC DEEP VEIN THROMBOSIS (DVT) OF FEMORAL VEIN, UNSPECIFIED LATERALITY (HCC): ICD-10-CM

## 2020-11-05 DIAGNOSIS — Z79.83 LONG TERM CURRENT USE OF BISPHOSPHONATES: ICD-10-CM

## 2020-11-05 PROCEDURE — 3725F SCREEN DEPRESSION PERFORMED: CPT | Performed by: FAMILY MEDICINE

## 2020-11-05 PROCEDURE — 1036F TOBACCO NON-USER: CPT | Performed by: FAMILY MEDICINE

## 2020-11-05 PROCEDURE — 99213 OFFICE O/P EST LOW 20 MIN: CPT | Performed by: FAMILY MEDICINE

## 2020-11-05 PROCEDURE — G0439 PPPS, SUBSEQ VISIT: HCPCS | Performed by: FAMILY MEDICINE

## 2020-11-09 RX ORDER — ALPRAZOLAM 1 MG/1
1 TABLET ORAL 2 TIMES DAILY
Qty: 180 TABLET | Refills: 5 | Status: SHIPPED | OUTPATIENT
Start: 2020-11-09 | End: 2021-06-04

## 2020-11-09 RX ORDER — DRONABINOL 5 MG/1
5 CAPSULE ORAL 3 TIMES DAILY
Qty: 90 CAPSULE | Refills: 5 | Status: SHIPPED | OUTPATIENT
Start: 2020-11-09 | End: 2021-06-04

## 2021-01-25 ENCOUNTER — IMMUNIZATIONS (OUTPATIENT)
Dept: FAMILY MEDICINE CLINIC | Facility: HOSPITAL | Age: 46
End: 2021-01-25

## 2021-01-25 DIAGNOSIS — Z23 ENCOUNTER FOR IMMUNIZATION: Primary | ICD-10-CM

## 2021-01-25 PROCEDURE — 0011A SARS-COV-2 / COVID-19 MRNA VACCINE (MODERNA) 100 MCG: CPT | Performed by: INTERNAL MEDICINE

## 2021-01-25 PROCEDURE — 91301 SARS-COV-2 / COVID-19 MRNA VACCINE (MODERNA) 100 MCG: CPT | Performed by: INTERNAL MEDICINE

## 2021-02-19 ENCOUNTER — IMMUNIZATIONS (OUTPATIENT)
Dept: FAMILY MEDICINE CLINIC | Facility: HOSPITAL | Age: 46
End: 2021-02-19

## 2021-02-19 DIAGNOSIS — Z23 ENCOUNTER FOR IMMUNIZATION: Primary | ICD-10-CM

## 2021-02-19 PROCEDURE — 91301 SARS-COV-2 / COVID-19 MRNA VACCINE (MODERNA) 100 MCG: CPT | Performed by: EMERGENCY MEDICINE

## 2021-02-19 PROCEDURE — 0012A SARS-COV-2 / COVID-19 MRNA VACCINE (MODERNA) 100 MCG: CPT | Performed by: EMERGENCY MEDICINE

## 2021-02-23 ENCOUNTER — APPOINTMENT (EMERGENCY)
Dept: RADIOLOGY | Facility: HOSPITAL | Age: 46
End: 2021-02-23
Payer: COMMERCIAL

## 2021-02-23 ENCOUNTER — APPOINTMENT (EMERGENCY)
Dept: CT IMAGING | Facility: HOSPITAL | Age: 46
End: 2021-02-23
Payer: COMMERCIAL

## 2021-02-23 ENCOUNTER — HOSPITAL ENCOUNTER (EMERGENCY)
Facility: HOSPITAL | Age: 46
Discharge: HOME | End: 2021-02-23
Attending: EMERGENCY MEDICINE
Payer: COMMERCIAL

## 2021-02-23 VITALS
OXYGEN SATURATION: 99 % | SYSTOLIC BLOOD PRESSURE: 137 MMHG | HEART RATE: 88 BPM | DIASTOLIC BLOOD PRESSURE: 78 MMHG | RESPIRATION RATE: 16 BRPM | BODY MASS INDEX: 19.44 KG/M2 | TEMPERATURE: 98.3 F | WEIGHT: 108.03 LBS

## 2021-02-23 DIAGNOSIS — R10.9 LEFT FLANK PAIN: Primary | ICD-10-CM

## 2021-02-23 LAB
ALBUMIN SERPL BCP-MCNC: 3.1 G/DL (ref 3.5–5)
ALP SERPL-CCNC: 147 U/L (ref 46–116)
ALT SERPL W P-5'-P-CCNC: 105 U/L (ref 12–78)
ANION GAP SERPL CALCULATED.3IONS-SCNC: 10 MMOL/L (ref 4–13)
AST SERPL W P-5'-P-CCNC: 65 U/L (ref 5–45)
ATRIAL RATE: 89 BPM
BACTERIA UR QL AUTO: ABNORMAL /HPF
BASOPHILS # BLD AUTO: 0.03 THOUSANDS/ΜL (ref 0–0.1)
BASOPHILS NFR BLD AUTO: 0 % (ref 0–1)
BILIRUB DIRECT SERPL-MCNC: 0.14 MG/DL (ref 0–0.2)
BILIRUB SERPL-MCNC: 0.5 MG/DL (ref 0.2–1)
BILIRUB UR QL STRIP: NEGATIVE
BUN SERPL-MCNC: 29 MG/DL (ref 5–25)
CALCIUM SERPL-MCNC: 8 MG/DL (ref 8.3–10.1)
CHLORIDE SERPL-SCNC: 104 MMOL/L (ref 100–108)
CLARITY UR: CLEAR
CO2 SERPL-SCNC: 22 MMOL/L (ref 21–32)
COLOR UR: ABNORMAL
CREAT SERPL-MCNC: 1.2 MG/DL (ref 0.6–1.3)
EOSINOPHIL # BLD AUTO: 0.14 THOUSAND/ΜL (ref 0–0.61)
EOSINOPHIL NFR BLD AUTO: 2 % (ref 0–6)
ERYTHROCYTE [DISTWIDTH] IN BLOOD BY AUTOMATED COUNT: 13.7 % (ref 11.6–15.1)
FLUAV RNA RESP QL NAA+PROBE: NEGATIVE
FLUBV RNA RESP QL NAA+PROBE: NEGATIVE
GFR SERPL CREATININE-BSD FRML MDRD: 55 ML/MIN/1.73SQ M
GLUCOSE SERPL-MCNC: 145 MG/DL (ref 65–140)
GLUCOSE UR STRIP-MCNC: NEGATIVE MG/DL
HCG SERPL QL: NEGATIVE
HCT VFR BLD AUTO: 39.8 % (ref 34.8–46.1)
HGB BLD-MCNC: 12.5 G/DL (ref 11.5–15.4)
HGB UR QL STRIP.AUTO: ABNORMAL
IMM GRANULOCYTES # BLD AUTO: 0.04 THOUSAND/UL (ref 0–0.2)
IMM GRANULOCYTES NFR BLD AUTO: 1 % (ref 0–2)
KETONES UR STRIP-MCNC: NEGATIVE MG/DL
LEUKOCYTE ESTERASE UR QL STRIP: NEGATIVE
LIPASE SERPL-CCNC: 26 U/L (ref 73–393)
LYMPHOCYTES # BLD AUTO: 2.71 THOUSANDS/ΜL (ref 0.6–4.47)
LYMPHOCYTES NFR BLD AUTO: 33 % (ref 14–44)
MCH RBC QN AUTO: 28.1 PG (ref 26.8–34.3)
MCHC RBC AUTO-ENTMCNC: 31.4 G/DL (ref 31.4–37.4)
MCV RBC AUTO: 89 FL (ref 82–98)
MONOCYTES # BLD AUTO: 0.78 THOUSAND/ΜL (ref 0.17–1.22)
MONOCYTES NFR BLD AUTO: 9 % (ref 4–12)
NEUTROPHILS # BLD AUTO: 4.65 THOUSANDS/ΜL (ref 1.85–7.62)
NEUTS SEG NFR BLD AUTO: 55 % (ref 43–75)
NITRITE UR QL STRIP: NEGATIVE
NON-SQ EPI CELLS URNS QL MICRO: ABNORMAL /HPF
NRBC BLD AUTO-RTO: 0 /100 WBCS
P AXIS: 75 DEGREES
PH UR STRIP.AUTO: 6 [PH]
PLATELET # BLD AUTO: 156 THOUSANDS/UL (ref 149–390)
PMV BLD AUTO: 8.9 FL (ref 8.9–12.7)
POTASSIUM SERPL-SCNC: 3.8 MMOL/L (ref 3.5–5.3)
PR INTERVAL: 142 MS
PROT SERPL-MCNC: 7.7 G/DL (ref 6.4–8.2)
PROT UR STRIP-MCNC: NEGATIVE MG/DL
QRS AXIS: 81 DEGREES
QRSD INTERVAL: 70 MS
QT INTERVAL: 370 MS
QTC INTERVAL: 450 MS
RBC # BLD AUTO: 4.45 MILLION/UL (ref 3.81–5.12)
RBC #/AREA URNS AUTO: ABNORMAL /HPF
RSV RNA RESP QL NAA+PROBE: NEGATIVE
SARS-COV-2 RNA RESP QL NAA+PROBE: NEGATIVE
SODIUM SERPL-SCNC: 136 MMOL/L (ref 136–145)
SP GR UR STRIP.AUTO: 1.01 (ref 1–1.03)
T WAVE AXIS: 65 DEGREES
UROBILINOGEN UR QL STRIP.AUTO: 0.2 E.U./DL
VENTRICULAR RATE: 89 BPM
WBC # BLD AUTO: 8.35 THOUSAND/UL (ref 4.31–10.16)
WBC #/AREA URNS AUTO: ABNORMAL /HPF

## 2021-02-23 PROCEDURE — 93005 ELECTROCARDIOGRAM TRACING: CPT

## 2021-02-23 PROCEDURE — 96366 THER/PROPH/DIAG IV INF ADDON: CPT

## 2021-02-23 PROCEDURE — 81001 URINALYSIS AUTO W/SCOPE: CPT | Performed by: EMERGENCY MEDICINE

## 2021-02-23 PROCEDURE — 84703 CHORIONIC GONADOTROPIN ASSAY: CPT | Performed by: EMERGENCY MEDICINE

## 2021-02-23 PROCEDURE — 96365 THER/PROPH/DIAG IV INF INIT: CPT

## 2021-02-23 PROCEDURE — 85025 COMPLETE CBC W/AUTO DIFF WBC: CPT | Performed by: EMERGENCY MEDICINE

## 2021-02-23 PROCEDURE — 96375 TX/PRO/DX INJ NEW DRUG ADDON: CPT

## 2021-02-23 PROCEDURE — 99285 EMERGENCY DEPT VISIT HI MDM: CPT

## 2021-02-23 PROCEDURE — 99285 EMERGENCY DEPT VISIT HI MDM: CPT | Performed by: EMERGENCY MEDICINE

## 2021-02-23 PROCEDURE — 80076 HEPATIC FUNCTION PANEL: CPT | Performed by: EMERGENCY MEDICINE

## 2021-02-23 PROCEDURE — 71045 X-RAY EXAM CHEST 1 VIEW: CPT

## 2021-02-23 PROCEDURE — 74176 CT ABD & PELVIS W/O CONTRAST: CPT

## 2021-02-23 PROCEDURE — 80048 BASIC METABOLIC PNL TOTAL CA: CPT | Performed by: EMERGENCY MEDICINE

## 2021-02-23 PROCEDURE — 93010 ELECTROCARDIOGRAM REPORT: CPT | Performed by: INTERNAL MEDICINE

## 2021-02-23 PROCEDURE — 0241U HB NFCT DS VIR RESP RNA 4 TRGT: CPT | Performed by: EMERGENCY MEDICINE

## 2021-02-23 PROCEDURE — 83690 ASSAY OF LIPASE: CPT | Performed by: EMERGENCY MEDICINE

## 2021-02-23 PROCEDURE — 36415 COLL VENOUS BLD VENIPUNCTURE: CPT | Performed by: EMERGENCY MEDICINE

## 2021-02-23 RX ORDER — MORPHINE SULFATE 4 MG/ML
4 INJECTION, SOLUTION INTRAMUSCULAR; INTRAVENOUS ONCE
Status: COMPLETED | OUTPATIENT
Start: 2021-02-23 | End: 2021-02-23

## 2021-02-23 RX ORDER — HYDROMORPHONE HCL/PF 1 MG/ML
1 SYRINGE (ML) INJECTION ONCE
Status: COMPLETED | OUTPATIENT
Start: 2021-02-23 | End: 2021-02-23

## 2021-02-23 RX ORDER — HYDROCODONE BITARTRATE AND ACETAMINOPHEN 5; 325 MG/1; MG/1
1 TABLET ORAL EVERY 6 HOURS PRN
Qty: 10 TABLET | Refills: 0 | Status: SHIPPED | OUTPATIENT
Start: 2021-02-23 | End: 2021-04-07 | Stop reason: SDUPTHER

## 2021-02-23 RX ORDER — ONDANSETRON 4 MG/1
4 TABLET, ORALLY DISINTEGRATING ORAL EVERY 6 HOURS PRN
Qty: 20 TABLET | Refills: 0 | Status: SHIPPED | OUTPATIENT
Start: 2021-02-23 | End: 2021-09-26 | Stop reason: HOSPADM

## 2021-02-23 RX ORDER — SULFAMETHOXAZOLE AND TRIMETHOPRIM 800; 160 MG/1; MG/1
1 TABLET ORAL 2 TIMES DAILY
Qty: 6 TABLET | Refills: 0 | Status: SHIPPED | OUTPATIENT
Start: 2021-02-23 | End: 2021-02-26

## 2021-02-23 RX ADMIN — MORPHINE SULFATE 4 MG: 4 INJECTION INTRAVENOUS at 10:08

## 2021-02-23 RX ADMIN — SODIUM CHLORIDE, SODIUM LACTATE, POTASSIUM CHLORIDE, AND CALCIUM CHLORIDE 1000 ML: .6; .31; .03; .02 INJECTION, SOLUTION INTRAVENOUS at 10:04

## 2021-02-23 RX ADMIN — HYDROMORPHONE HYDROCHLORIDE 1 MG: 1 INJECTION, SOLUTION INTRAMUSCULAR; INTRAVENOUS; SUBCUTANEOUS at 11:29

## 2021-02-23 NOTE — Clinical Note
Savannah Rob was seen and treated in our emergency department on 2/23/2021  Diagnosis:     Esther Vega  may return to work on return date  She may return on this date: 02/25/2021         If you have any questions or concerns, please don't hesitate to call        Carlos Alberto MD    ______________________________           _______________          _______________  Hospital Representative                              Date                                Time

## 2021-02-23 NOTE — ED PROVIDER NOTES
History  Chief Complaint   Patient presents with    Flank Pain     pt with possible on left side  History provided by:  Patient   used: No    Flank Pain  Pain location:  L flank  Pain quality: aching    Pain radiates to:  Groin  Pain severity:  Severe  Onset quality:  Gradual  Duration:  1 day  Timing:  Intermittent  Progression:  Waxing and waning  Chronicity:  New  Relieved by:  Nothing  Worsened by:  Nothing  Ineffective treatments:  None tried  Associated symptoms: diarrhea, dysuria and vomiting    Associated symptoms: no chest pain, no chills, no cough, no fever, no hematuria, no shortness of breath and no sore throat             Prior to Admission Medications   Prescriptions Last Dose Informant Patient Reported? Taking?    ALPRAZolam (XANAX) 1 mg tablet   No No   Sig: Take 1 tablet (1 mg total) by mouth 2 (two) times a day   Cannabinoids (THC FREE) 20 MG/ML LIQD   Yes No   Sig: Take by mouth   Multiple Vitamin (MULTIVITAMIN) tablet   Yes No   Sig: Take 1 tablet by mouth daily   albuterol (ACCUNEB) 1 25 MG/3ML nebulizer solution  Self Yes No   Sig: Take 1 ampule by nebulization every 4 (four) hours as needed for wheezing   alendronate (FOSAMAX) 70 mg tablet   Yes No   Sig: Take 70 mg by mouth every 7 days   apixaban (ELIQUIS) 5 mg   Yes No   Sig: Take 10 mg by mouth 2 (two) times a day   atovaquone (MEPRON) 750 mg/5 mL suspension   Yes No   Sig: Take 1,500 mg by mouth daily   colistimethate (COLY-MYCIN M) inhalation solution   Yes No   Sig: Take 150 mg by nebulization every 12 (twelve) hours   dexlansoprazole (DEXILANT) 60 MG capsule   Yes No   Sig: Take 60 mg by mouth daily   dronabinol (MARINOL) 5 MG capsule   No No   Sig: Take 1 capsule (5 mg total) by mouth 3 (three) times a day   ferrous sulfate 325 (65 Fe) mg tablet   Yes No   Sig: Take 325 mg by mouth daily with breakfast   insulin aspart (NovoLOG) 100 units/mL injection   Yes No   Sig: Inject under the skin 3 (three) times a day before meals   magnesium oxide (MAG-OX) 400 mg   Yes No   Sig: Take 400 mg by mouth 2 (two) times a day   pancrelipase, Lip-Prot-Amyl, (CREON) 24,000 units   Yes No   Sig: Take 48,000 Units by mouth 3 (three) times a day with meals   polyethylene glycol (MIRALAX) 17 g packet   Yes No   Sig: Take 17 g by mouth 2 (two) times a day as needed   predniSONE 10 mg tablet   Yes No   Sig: Take 10 mg by mouth daily   tacrolimus (PROGRAF) 5 mg capsule   Yes No   Sig: Take 2 5 mg by mouth daily with breakfast   tacrolimus (PROGRAF) 5 mg capsule   Yes No   Sig: Take 3 mg by mouth daily with dinner      Facility-Administered Medications: None       Past Medical History:   Diagnosis Date    Asthma     Chronic pain     [facility] [address] [facility]     Diabetes mellitus ([facility] [address] [facility]     GERD (gastroesophageal reflux disease)     HBP (high blood pressure)     Kidney stone     PONV (postoperative nausea and vomiting)     Transplant recipient     Lung- 2013, 2017       Past Surgical History:   Procedure Laterality Date    APPENDECTOMY      BREAST SURGERY      Aug      BRONCHOSCOPY      CHOLECYSTECTOMY      COLONOSCOPY      EGD  07/2019    Yeast Infect       FL RETROGRADE PYELOGRAM  1/9/2020    FL RETROGRADE PYELOGRAM  2/4/2020    GASTROSTOMY TUBE, PLACE      LUNG SURGERY      LUNG TRANSPLANT      X2    MI CYSTO/URETERO W/LITHOTRIPSY &INDWELL STENT INSRT Right 2/4/2020    Procedure: CYSTOSCOPY URETEROSCOPY WITH LITHOTRIPSY HOLMIUM LASER, RETROGRADE PYELOGRAM AND INSERTION STENT URETERAL;  Surgeon: Evelin Morocho MD;  Location: [facility];  Service: Urology    MI CYSTOURETHROSCOPY,URETER CATHETER Right 1/9/2020    Procedure: CYSTOSCOPY RETROGRADE PYELOGRAM WITH INSERTION STENT URETERAL;  Surgeon: Evelin Morocho MD;  Location: [facility];  Service: Urology       Family History   Problem Relation Age of Onset    Alcohol abuse Mother     Mental illness Mother     Alcohol abuse Father     COPD Maternal Grandmother     COPD Maternal Grandfather     COPD Paternal Grandmother      I have reviewed and agree with the history as documented  E-Cigarette/Vaping    E-Cigarette Use Never User      E-Cigarette/Vaping Substances     Social History     Tobacco Use    Smoking status: Former Smoker     Packs/day: 0 50     Years: 12 00     Pack years: 6 00    Smokeless tobacco: Never Used   Substance Use Topics    Alcohol use: Yes     Frequency: 2-4 times a month     Drinks per session: 1 or 2     Binge frequency: Never     Comment: social    Drug use: Yes     Frequency: 2 0 times per week     Types: Marijuana     Comment: medical edible marijuana prescribed       Review of Systems   Constitutional: Negative for chills and fever  HENT: Negative for ear pain and sore throat  Eyes: Negative for pain and visual disturbance  Respiratory: Negative for cough and shortness of breath  Cardiovascular: Negative for chest pain and palpitations  Gastrointestinal: Positive for diarrhea and vomiting  Negative for abdominal pain  Genitourinary: Positive for dysuria and flank pain  Negative for hematuria  Musculoskeletal: Negative for arthralgias and back pain  Skin: Negative for color change and rash  Neurological: Negative for seizures and syncope  All other systems reviewed and are negative  Physical Exam  Physical Exam  Constitutional:       Appearance: Normal appearance  She is obese  She is not toxic-appearing  HENT:      Head: Normocephalic and atraumatic  Nose: Nose normal       Mouth/Throat:      Mouth: Mucous membranes are moist       Pharynx: Oropharynx is clear  Eyes:      Extraocular Movements: Extraocular movements intact  Conjunctiva/sclera: Conjunctivae normal       Pupils: Pupils are equal, round, and reactive to light  Neck:      Musculoskeletal: Normal range of motion and neck supple  No neck rigidity  Cardiovascular:      Rate and Rhythm: Normal rate and regular rhythm  Pulses: Normal pulses  Pulmonary:      Effort: Pulmonary effort is normal  No respiratory distress  Breath sounds: Normal breath sounds  Abdominal:      General: There is no distension  Palpations: Abdomen is soft  Tenderness: There is no abdominal tenderness  Musculoskeletal: Normal range of motion  General: No swelling, tenderness or signs of injury  Skin:     General: Skin is warm and dry  Capillary Refill: Capillary refill takes less than 2 seconds  Findings: No rash  Neurological:      General: No focal deficit present  Mental Status: She is alert and oriented to person, place, and time  Psychiatric:         Mood and Affect: Mood normal          Thought Content: Thought content normal          Vital Signs  ED Triage Vitals   Temperature Pulse Respirations Blood Pressure SpO2   02/23/21 0919 02/23/21 0919 02/23/21 0919 02/23/21 0919 02/23/21 0919   98 3 °F (36 8 °C) 101 16 162/81 92 %      Temp src Heart Rate Source Patient Position - Orthostatic VS BP Location FiO2 (%)   -- 02/23/21 1114 -- 02/23/21 1114 --    Monitor  Left arm       Pain Score       02/23/21 1008       7           Vitals:    02/23/21 0919 02/23/21 1114 02/23/21 1234   BP: 162/81 141/79 137/78   Pulse: 101 84 88         Visual Acuity      ED Medications  Medications   lactated ringers bolus 1,000 mL (0 mL Intravenous Stopped 2/23/21 1234)   morphine (PF) 4 mg/mL injection 4 mg (4 mg Intravenous Given 2/23/21 1008)   HYDROmorphone (DILAUDID) injection 1 mg (1 mg Intravenous Given 2/23/21 1129)       Diagnostic Studies  Results Reviewed     Procedure Component Value Units Date/Time    COVID19, Influ[facility] A/B, [facility] [[id number]]  (Normal) Collected: 02/23/21 0945    Lab Status: Final result Specimen: Nares from Nasopharyngeal Swab Updated: 02/23/21 1029     SARS-CoV-2 Negative     INFLUENZA A PCR Negative     INFLUENZA B PCR Negative     RSV PCR Negative    Narrative:        This test has been authorized by FDA under an EUA (Emergency Use Assay) for use by authorized laboratories  Clinical caution and judgement should be used with the interpretation of these results with consideration of the clinical impression and other laboratory testing  Testing reported as "Positive" or "Negative" has been proven to be accurate according to standard laboratory validation requirements  All testing is performed with control materials showing appropriate reactivity at standard intervals      Urine Microscopic [[id number]]  (Abnormal) Collected: 02/23/21 1004    Lab Status: Final result Specimen: Urine, Clean Catch Updated: 02/23/21 1029     RBC, UA 4-10 /hpf      WBC, UA 1-2 /hpf      Epithelial Cells Occasional /hpf      Bacteria, UA None Seen /hpf     Basic metabolic panel [[id number]]  (Abnormal) Collected: 02/23/21 0945    Lab Status: Final result Specimen: Blood from Central Venous Line Updated: 02/23/21 1018     Sodium 136 mmol/L      Potassium 3 8 mmol/L      Chloride 104 mmol/L      CO2 22 mmol/L      ANION GAP 10 mmol/L      BUN 29 mg/dL      Creatinine 1 20 mg/dL      Glucose 145 mg/dL      Calcium 8 0 mg/dL      eGFR 55 ml/min/1 73sq m     Narrative:      [place] guidelines for Chronic Kidney Disease (CKD):     Stage 1 with normal or high GFR (GFR > 90 mL/min/1 73 square meters)    Stage 2 Mild CKD (GFR = 60-89 mL/min/1 73 square meters)    Stage 3A Moderate CKD (GFR = 45-59 mL/min/1 73 square meters)    Stage 3B Moderate CKD (GFR = 30-44 mL/min/1 73 square meters)    Stage 4 Severe CKD (GFR = 15-29 mL/min/1 73 square meters)    Stage 5 End Stage CKD (GFR <15 mL/min/1 73 square meters)  Note: GFR calculation is accurate only with a steady state creatinine    Hepatic function panel [[id number]]  (Abnormal) Collected: 02/23/21 0945    Lab Status: Final result Specimen: Blood from Central Venous Line Updated: 02/23/21 1018     Total Bilirubin 0 50 mg/dL      Bilirubin, Direct 0 14 mg/dL      Alkaline Phosphatase 147 U/L      AST 65 U/L       U/L      Total Protein 7 7 g/dL      Albumin 3 1 g/dL     Lipase [[id number]]  (Abnormal) Collected: 02/23/21 0945    Lab Status: Final result Specimen: Blood from Central Venous Line Updated: 02/23/21 1018     Lipase 26 u/L     UA w Reflex to Microscopic w Reflex to Culture [[id number]]  (Abnormal) Collected: 02/23/21 1004    Lab Status: Final result Specimen: Urine, Clean Catch Updated: 02/23/21 1013     Color, UA Light Yellow     Clarity, UA Clear     Specific Gravity, UA 1 015     pH, UA 6 0     Leukocytes, UA Negative     Nitrite, UA Negative     Protein, UA Negative mg/dl      Glucose, UA Negative mg/dl      Ketones, UA Negative mg/dl      Urobilinogen, UA 0 2 E U /dl      Bilirubin, UA Negative     Blood, UA Moderate    hCG, qualitative pregnancy [[id number]]  (Normal) Collected: 02/23/21 0945    Lab Status: Final result Specimen: Blood from Central Venous Line Updated: 02/23/21 1011     Preg, Serum Negative    CBC and differential [[id number]] Collected: 02/23/21 0945    Lab Status: Final result Specimen: Blood from Central Venous Line Updated: 02/23/21 0952     WBC 8 35 Thousand/uL      RBC 4 45 Million/uL      Hemoglobin 12 5 g/dL      Hematocrit 39 8 %      MCV 89 fL      MCH 28 1 pg      MCHC 31 4 g/dL      RDW 13 7 %      MPV 8 9 fL      Platelets 416 Thousands/uL      nRBC 0 /100 WBCs      Neutrophils Relative 55 %      Immat GRANS % 1 %      Lymphocytes Relative 33 %      Monocytes Relative 9 %      Eosinophils Relative 2 %      Basophils Relative 0 %      Neutrophils Absolute 4 65 Thousands/µL      Immature Grans Absolute 0 04 Thousand/uL      Lymphocytes Absolute 2 71 Thousands/µL      Monocytes Absolute 0 78 Thousand/µL      Eosinophils Absolute 0 14 Thousand/µL      Basophils Absolute 0 03 Thousands/µL                  CT abdomen pelvis wo contrast   Final Result by Chris Cifuentes DO (02/23 1109)      No acute abnormality identified  1 4 cm left lower pole renal calculus is similar to prior study of 2/3/2020  Previous noted right renal calculi have resolved  There is no hydronephrosis  Additional chronic findings, as described  Workstation performed: [id number]         XR chest 1 view portable   Final Result by Jeff Lopez MD (02/23 1131)      No acute cardiopulmonary disease  Workstation performed: [id number]                    Procedures  Procedures         ED Course  ED Course as of Feb 23 1709   Tue Feb 23, 2021   1010 EKG reviewed by me  Normal sinus rhythm at rate of 89, normal intervals, normal axis, no acute ST changes to suggest cardiac ischemia  SBIRT 20yo+      Most Recent Value   SBIRT (22 yo +)   In order to provide better care to our patients, we are screening all of our patients for alcohol and drug use  Would it be okay to ask you these screening questions? Yes Filed at: 02/23/2021 1133   Initial Alcohol Screen: US AUDIT-C    1  How often do you have a drink containing alcohol?  0 Filed at: 02/23/2021 1133   2  How many drinks containing alcohol do you have on a typical day you are drinking? 0 Filed at: 02/23/2021 1133   3a  Male UNDER 65: How often do you have five or more drinks on one occasion? 0 Filed at: 02/23/2021 1133   3b  FEMALE Any Age, or MALE 65+: How often do you have 4 or more drinks on one occassion? 0 Filed at: 02/23/2021 1133   Audit-C Score  0 Filed at: 02/23/2021 1133   NICOLE: How many times in the past year have you    Used an illegal drug or used a prescription medication for non-medical reasons? Never Filed at: 02/23/2021 1133                    MDM  Number of Diagnoses or Management Options  Left flank pain: new and requires workup  Diagnosis management comments: 53-year-old female with complicated medical history including cystic fibrosis, multiple lung transplants presents for evaluation of left-sided flank pain  Does have a history of ureteral stones and states that her current pain feels similar to previous episodes of ureteral stones  Pain is located in the left lower back with radiation around to the left lower quadrant/groin  She is not having associated shortness of breath  CT scan of the abdomen and pelvis does not demonstrate any acute pathology  Patient does have a large left lower pole renal stone  There is no obvious ureteral stone  Patient does have microscopic hematuria  I suspect was secondary to the large nonobstructive renal stone, or possibly a recently passed ureteral stone  Less likely possibility could be hemorrhagic cystitis although the remainder of the urinalysis does not really suggest infection  However, given the patient's significant comorbidities and immunocompromised state we will cover with antibiotics for possible hemorrhagic cystitis  Patient's pain was well controlled in the emergency department  At this time we will plan to discharge with close up the follow-up  We did discuss return precautions  Amount and/or Complexity of Data Reviewed  Clinical lab tests: reviewed and ordered  Tests in the radiology section of CPT®: reviewed and ordered  Review and summarize past medical records: yes  Independent visualization of images, tracings, or specimens: yes    She denies associated    Disposition  Final diagnoses:   Left flank pain     Time reflects when diagnosis was documented in both MDM as applicable and the Disposition within this note     Time User Action Codes Description Comment    2/23/2021 12:09 PM Atif Aguilera Add [R10 9] Left flank pain       ED Disposition     ED Disposition Condition Date/Time Comment    Discharge Stable Tue Feb 23, 2021 12:12 PM Komal Fortune discharge to home/self care              Follow-up Information     Follow up With Specialties Details Why Contact Info Additional Information    Harpreet Pacheco MD Family Medicine   [address] [place]  [address] [facility]  [phone]       [facility]   [address]  [address] [address]  [address] [place] [address] For Urology [facility] [address] [facility], [facility], [place], [place], [address]          Discharge Medication List as of 2/23/2021 12:13 PM      START taking these medications    Details   HYDROcodone-acetaminophen (NORCO) 5-325 mg per tablet Take 1 tablet by mouth every 6 (six) hours as needed for pain for up to 10 dosesMax Daily Amount: 4 tablets, Starting Tue 2/23/2021, Print      ondansetron (ZOFRAN-ODT) 4 mg disintegrating tablet Take 1 tablet (4 mg total) by mouth every 6 (six) hours as needed for nausea or vomiting, Starting Tue 2/23/2021, Print      sulfamethoxazole-trimethoprim (BACTRIM DS) 800-160 mg per tablet Take 1 tablet by mouth 2 (two) times a day for 3 days smx-tmp DS (BACTRIM) 800-160 mg tabs (1tab q12 D10), Starting Tue 2/23/2021, Until Fri 2/26/2021, Print         CONTINUE these medications which have NOT CHANGED    Details   albuterol (ACCUNEB) 1 25 MG/3ML nebulizer solution Take 1 ampule by nebulization every 4 (four) hours as needed for wheezing, Historical Med      alendronate (FOSAMAX) 70 mg tablet Take 70 mg by mouth every 7 days, Historical Med      ALPRAZolam (XANAX) 1 mg tablet Take 1 tablet (1 mg total) by mouth 2 (two) times a day, Starting Mon 11/9/2020, Normal      apixaban (ELIQUIS) 5 mg Take 10 mg by mouth 2 (two) times a day, Historical Med      atovaquone (MEPRON) 750 mg/5 mL suspension Take 1,500 mg by mouth daily, Historical Med      Cannabinoids (THC FREE) 20 MG/ML LIQD Take by mouth, Historical Med      colistimethate (COLY-MYCIN M) inhalation solution Take 150 mg by nebulization every 12 (twelve) hours, Historical Med      dexlansoprazole (DEXILANT) 60 MG capsule Take 60 mg by mouth daily, Historical Med      dronabinol (MARINOL) 5 MG capsule Take 1 capsule (5 mg total) by mouth 3 (three) times a day, Starting Mon 11/9/2020, Normal      ferrous sulfate 325 (65 Fe) mg tablet Take 325 mg by mouth daily with breakfast, Historical Med      insulin aspart (NovoLOG) 100 units/mL injection Inject under the skin 3 (three) times a day before meals, Historical Med      magnesium oxide (MAG-OX) 400 mg Take 400 mg by mouth 2 (two) times a day, Historical Med      Multiple Vitamin (MULTIVITAMIN) tablet Take 1 tablet by mouth daily, Historical Med      pancrelipase, Lip-Prot-Amyl, (CREON) 24,000 units Take 48,000 Units by mouth 3 (three) times a day with meals, Historical Med      polyethylene glycol (MIRALAX) 17 g packet Take 17 g by mouth 2 (two) times a day as needed, Historical Med      predniSONE 10 mg tablet Take 10 mg by mouth daily, Historical Med      !! tacrolimus (PROGRAF) 5 mg capsule Take 2 5 mg by mouth daily with breakfast, Historical Med      !! tacrolimus (PROGRAF) 5 mg capsule Take 3 mg by mouth daily with dinner, Historical Med       !! - Potential duplicate medications found  Please discuss with provider  No discharge procedures on file      PDMP Review     None          ED Provider  Electronically Signed by           Esdras Dickson MD  02/23/21 0125

## 2021-04-07 ENCOUNTER — TELEMEDICINE (OUTPATIENT)
Dept: FAMILY MEDICINE CLINIC | Facility: CLINIC | Age: 46
End: 2021-04-07
Payer: MEDICARE

## 2021-04-07 ENCOUNTER — APPOINTMENT (OUTPATIENT)
Dept: RADIOLOGY | Facility: CLINIC | Age: 46
End: 2021-04-07
Payer: COMMERCIAL

## 2021-04-07 DIAGNOSIS — Z94.2 HISTORY OF LUNG TRANSPLANT (HCC): ICD-10-CM

## 2021-04-07 DIAGNOSIS — B34.9 VIRAL INFECTION, UNSPECIFIED: ICD-10-CM

## 2021-04-07 DIAGNOSIS — J20.9 ACUTE BRONCHITIS, UNSPECIFIED ORGANISM: ICD-10-CM

## 2021-04-07 DIAGNOSIS — G89.4 CHRONIC PAIN DISORDER: ICD-10-CM

## 2021-04-07 DIAGNOSIS — J20.9 ACUTE BRONCHITIS, UNSPECIFIED ORGANISM: Primary | ICD-10-CM

## 2021-04-07 DIAGNOSIS — E84.9 CYSTIC FIBROSIS (HCC): ICD-10-CM

## 2021-04-07 PROCEDURE — 99214 OFFICE O/P EST MOD 30 MIN: CPT | Performed by: FAMILY MEDICINE

## 2021-04-07 PROCEDURE — 71046 X-RAY EXAM CHEST 2 VIEWS: CPT

## 2021-04-07 PROCEDURE — 0241U HB NFCT DS VIR RESP RNA 4 TRGT: CPT | Performed by: FAMILY MEDICINE

## 2021-04-07 RX ORDER — ESCITALOPRAM OXALATE 10 MG/1
10 TABLET ORAL DAILY
COMMUNITY
Start: 2020-11-27 | End: 2021-09-26 | Stop reason: HOSPADM

## 2021-04-07 RX ORDER — HYDROCODONE BITARTRATE AND ACETAMINOPHEN 5; 325 MG/1; MG/1
1 TABLET ORAL EVERY 6 HOURS PRN
Qty: 25 TABLET | Refills: 0 | Status: SHIPPED | OUTPATIENT
Start: 2021-04-07 | End: 2021-06-10 | Stop reason: SDUPTHER

## 2021-04-07 RX ORDER — CEFDINIR 300 MG/1
300 CAPSULE ORAL EVERY 12 HOURS SCHEDULED
Qty: 20 CAPSULE | Refills: 0 | Status: SHIPPED | OUTPATIENT
Start: 2021-04-07 | End: 2021-04-17

## 2021-04-07 RX ORDER — VALACYCLOVIR HYDROCHLORIDE 500 MG/1
500 TABLET, FILM COATED ORAL DAILY
COMMUNITY
Start: 2020-11-04 | End: 2021-09-07 | Stop reason: SDUPTHER

## 2021-04-07 RX ORDER — TRAZODONE HYDROCHLORIDE 50 MG/1
25 TABLET ORAL
COMMUNITY
Start: 2020-10-19 | End: 2021-09-26 | Stop reason: HOSPADM

## 2021-04-07 RX ORDER — FAMOTIDINE 20 MG/1
20 TABLET, FILM COATED ORAL
COMMUNITY
Start: 2020-10-15 | End: 2021-09-07 | Stop reason: SDUPTHER

## 2021-04-07 RX ORDER — PREDNISONE 20 MG/1
TABLET ORAL
Qty: 18 TABLET | Refills: 0 | Status: SHIPPED | OUTPATIENT
Start: 2021-04-07 | End: 2021-04-27

## 2021-04-07 NOTE — PROGRESS NOTES
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 meds from CF   Lets be cautious here     Nebs qid   And pain med  And abx and prednisone  CXR and RSV flu covid swab           Problem List Items Addressed This Visit        Respiratory    Cystic fibrosis ([facility] [place] [address] )       Other    History of lung transplant ([facility] [place] [address] )    Chronic pain disorder      Other Visit Diagnoses     Acute bronchitis, unspecified organism    -  Primary    Relevant Medications    predniSONE 20 mg tablet    cefdinir (OMNICEF) 300 mg capsule    HYDROcodone-acetaminophen (NORCO) 5-325 mg per tablet    Other Relevant Orders    XR chest pa & lateral    Multiplex COVID19, Influenza A/B, RSV PCR, [facility] - Collected at [place] [address] or [facility]    Viral infection, unspecified        Relevant Orders    Multiplex COVID19, Influenza A/B, RSV PCR, [facility] - Collected at [place] [address] or [facility]               Reason for visit is   Chief Complaint   Patient presents with    Virtual Regular Visit        Encounter provider Suha Chacon MD    Provider located at [address] [place] [id number]      Recent Visits  No visits were found meeting these conditions  Showing recent visits within past 7 days and meeting all other requirements     Today's Visits  Date Type Provider Dept   04/07/21 Telemedicine [facility]   Showing today's visits and meeting all other requirements     Future Appointments  No visits were found meeting these conditions  Showing future appointments within next 150 days and meeting all other requirements        The patient was identified by name and date of birth  Amina Alexander was informed that this is a telemedicine visit and that the visit is being conducted through [address] [place] [address] and patient was informed that this is a secure, HIPAA-compliant platform  She agrees to proceed     My office door was closed  No one else was in the room  She acknowledged consent and understanding of privacy and security of the video platform  The patient has agreed to participate and understands they can discontinue the visit at any time  Patient is aware this is a billable service  Ana Luisa Loredo is a 39 y o  female    HPI   Hx of lung transplant   Sore throat and layrngitis 2 days ago evening   LLL junky (and this is her new lung)   Coughing  Rattle   Deep breath with coughing fit   Right eye stye this am   Chyrl Rias not going away   Taking tyelnol   No fever  Oxygen is 94%  (normal for her)   110 pulse   Using nebs  Bid         Past Medical History:   Diagnosis Date    Asthma     Chronic pain     Cystic fibrosis ([facility] [address] )     Diabetes mellitus ([facility] [address] )     GERD (gastroesophageal reflux disease)     HBP (high blood pressure)     Kidney stone     PONV (postoperative nausea and vomiting)     Transplant recipient     Lung- 2013, 2017       Past Surgical History:   Procedure Laterality Date    APPENDECTOMY      BREAST SURGERY      Aug      BRONCHOSCOPY      CHOLECYSTECTOMY      COLONOSCOPY      EGD  07/2019    Yeast Infect       FL RETROGRADE PYELOGRAM  1/9/2020    FL RETROGRADE PYELOGRAM  2/4/2020    GASTROSTOMY TUBE, PLACE      LUNG SURGERY      LUNG TRANSPLANT      X2    WA CYSTO/URETERO W/LITHOTRIPSY &INDWELL STENT INSRT Right 2/4/2020    Procedure: CYSTOSCOPY URETEROSCOPY WITH LITHOTRIPSY HOLMIUM LASER, RETROGRADE PYELOGRAM AND INSERTION STENT URETERAL;  Surgeon: Karina Yip MD;  Location: [facility];  Service: Urology    WA CYSTOURETHROSCOPY,URETER CATHETER Right 1/9/2020    Procedure: CYSTOSCOPY RETROGRADE PYELOGRAM WITH INSERTION STENT URETERAL;  Surgeon: Karina Yip MD;  Location: [facility];  Service: Urology       Current Outpatient Medications   Medication Sig Dispense Refill    albuterol (ACCUNEB) 1 25 MG/3ML nebulizer solution Take 1 ampule by nebulization every 4 (four) hours as needed for wheezing      alendronate (FOSAMAX) 70 mg tablet Take 70 mg by mouth every 7 days      ALPRAZolam (XANAX) 1 mg tablet Take 1 tablet (1 mg total) by mouth 2 (two) times a day 180 tablet 5    apixaban (ELIQUIS) 5 mg Take 10 mg by mouth 2 (two) times a day      atovaquone (MEPRON) 750 mg/5 mL suspension Take 1,500 mg by mouth daily      Cannabinoids (THC FREE) 20 MG/ML LIQD Take by mouth      cefdinir (OMNICEF) 300 mg capsule Take 1 capsule (300 mg total) by mouth every 12 (twelve) hours for 10 days 20 capsule 0    colistimethate (COLY-MYCIN M) inhalation solution Take 150 mg by nebulization every 12 (twelve) hours      dexlansoprazole (DEXILANT) 60 MG capsule Take 60 mg by mouth daily      dronabinol (MARINOL) 5 MG capsule Take 1 capsule (5 mg total) by mouth 3 (three) times a day 90 capsule 5    escitalopram (LEXAPRO) 10 mg tablet Take 10 mg by mouth daily      famotidine (PEPCID) 20 mg tablet Take 20 mg by mouth      ferrous sulfate 325 (65 Fe) mg tablet Take 325 mg by mouth daily with breakfast      HYDROcodone-acetaminophen (NORCO) 5-325 mg per tablet Take 1 tablet by mouth every 6 (six) hours as needed for painMax Daily Amount: 4 tablets 25 tablet 0    insulin aspart (NovoLOG) 100 units/mL injection Inject under the skin 3 (three) times a day before meals      magnesium oxide (MAG-OX) 400 mg Take 400 mg by mouth 2 (two) times a day      Multiple Vitamin (MULTIVITAMIN) tablet Take 1 tablet by mouth daily      NON FORMULARY Discontinue supplemental oxygen      ondansetron (ZOFRAN-ODT) 4 mg disintegrating tablet Take 1 tablet (4 mg total) by mouth every 6 (six) hours as needed for nausea or vomiting 20 tablet 0    pancrelipase, Lip-Prot-Amyl, (CREON) 24,000 units Take 48,000 Units by mouth 3 (three) times a day with meals      polyethylene glycol (MIRALAX) 17 g packet Take 17 g by mouth 2 (two) times a day as needed      predniSONE 10 mg tablet Take 10 mg by mouth daily      predniSONE 20 mg tablet Take 1 tab tid x3d then 1 tab bid x 3d then 1 tab qd x3d 18 tablet 0    tacrolimus (PROGRAF) 5 mg capsule Take 2 5 mg by mouth daily with breakfast      tacrolimus (PROGRAF) 5 mg capsule Take 3 mg by mouth daily with dinner      traZODone (DESYREL) 50 mg tablet Take 25 mg by mouth      valACYclovir (VALTREX) 500 mg tablet Take 500 mg by mouth daily       No current facility-administered medications for this visit  Allergies   Allergen Reactions    Vancomycin Angioedema and Hives    Ceftazidime Headache     Severe headaches after desensitization       Review of Systems   Constitutional: Negative for fever and unexpected weight change  HENT: Negative for nosebleeds and trouble swallowing  Eyes: Negative for visual disturbance  Respiratory: Positive for cough, chest tightness and shortness of breath  Cardiovascular: Negative for chest pain, palpitations and leg swelling  Gastrointestinal: Negative for abdominal pain, constipation, diarrhea and nausea  Endocrine: Negative for cold intolerance  Genitourinary: Negative for dysuria and urgency  Musculoskeletal: Negative for joint swelling and myalgias  Skin: Negative for rash  Neurological: Positive for weakness  Negative for tremors, seizures and syncope  Hematological: Does not bruise/bleed easily  Psychiatric/Behavioral: Positive for behavioral problems and sleep disturbance  Negative for hallucinations and suicidal ideas  The patient is nervous/anxious  Video Exam    There were no vitals filed for this visit  Physical Exam  Constitutional:       Appearance: She is well-developed  Eyes:      Conjunctiva/sclera: Conjunctivae normal    Neck:      Musculoskeletal: Normal range of motion  Pulmonary:      Effort: Pulmonary effort is normal    Neurological:      Mental Status: She is alert and oriented to person, place, and time     Psychiatric:         Behavior: Behavior normal  Thought Content: Thought content normal          Judgment: Judgment normal           I spent 25 minutes directly with the patient during this visit      [address] Lashawn Zamudio acknowledges that she has consented to an online visit or consultation  She understands that the online visit is based solely on information provided by her, and that, in the absence of a face-to-face physical evaluation by the physician, the diagnosis she receives is both limited and provisional in terms of accuracy and completeness  This is not intended to replace a full medical face-to-face evaluation by the physician  Dulce Wilkins understands and accepts these terms

## 2021-04-08 ENCOUNTER — TELEPHONE (OUTPATIENT)
Dept: FAMILY MEDICINE CLINIC | Facility: CLINIC | Age: 46
End: 2021-04-08

## 2021-04-08 LAB
FLUAV RNA RESP QL NAA+PROBE: NEGATIVE
FLUBV RNA RESP QL NAA+PROBE: NEGATIVE
RSV RNA RESP QL NAA+PROBE: NEGATIVE
SARS-COV-2 RNA RESP QL NAA+PROBE: NEGATIVE

## 2021-04-08 NOTE — TELEPHONE ENCOUNTER
----- Message from [facility] sent at 4/8/2021  1:32 PM EDT -----  Please make sure patient is aware of negative COVID, Flu and RSV

## 2021-04-23 ENCOUNTER — OFFICE VISIT (OUTPATIENT)
Dept: OBGYN CLINIC | Facility: MEDICAL CENTER | Age: 46
End: 2021-04-23
Payer: COMMERCIAL

## 2021-04-23 VITALS
WEIGHT: 108.4 LBS | SYSTOLIC BLOOD PRESSURE: 130 MMHG | DIASTOLIC BLOOD PRESSURE: 80 MMHG | BODY MASS INDEX: 19.21 KG/M2 | HEIGHT: 63 IN

## 2021-04-23 DIAGNOSIS — Z12.31 ENCOUNTER FOR SCREENING MAMMOGRAM FOR MALIGNANT NEOPLASM OF BREAST: ICD-10-CM

## 2021-04-23 DIAGNOSIS — Z01.419 ENCOUNTER FOR GYNECOLOGICAL EXAMINATION WITHOUT ABNORMAL FINDING: Primary | ICD-10-CM

## 2021-04-23 DIAGNOSIS — Z12.4 SCREENING FOR CERVICAL CANCER: ICD-10-CM

## 2021-04-23 PROCEDURE — 87624 HPV HI-RISK TYP POOLED RSLT: CPT | Performed by: STUDENT IN AN ORGANIZED HEALTH CARE EDUCATION/TRAINING PROGRAM

## 2021-04-23 PROCEDURE — G0101 CA SCREEN;PELVIC/BREAST EXAM: HCPCS | Performed by: STUDENT IN AN ORGANIZED HEALTH CARE EDUCATION/TRAINING PROGRAM

## 2021-04-23 PROCEDURE — G0145 SCR C/V CYTO,THINLAYER,RESCR: HCPCS | Performed by: STUDENT IN AN ORGANIZED HEALTH CARE EDUCATION/TRAINING PROGRAM

## 2021-04-23 NOTE — ASSESSMENT & PLAN NOTE
38 yo here for annual exam    Pap cotest collected  Discussed HPV catch up- declines low risk  Mammo ordered  Reviewed breast self awareness  Discussed healthy diet and exercise  Sexually active  Occasional perineal pain and bleeding

## 2021-04-23 NOTE — PROGRESS NOTES
Assessment/Plan:    Encounter for gynecological examination without abnormal finding  38 yo here for annual exam    Pap cotest collected  Discussed HPV catch up- declines low risk  Mammo ordered  Reviewed breast self awareness  Discussed healthy diet and exercise  Sexually active  Occasional perineal pain and bleeding  Diagnoses and all orders for this visit:    Encounter for gynecological examination without abnormal finding    Encounter for screening mammogram for malignant neoplasm of breast  -     Mammo screening bilateral w 3d & cad; Future    Screening for cervical cancer  -     Liquid-based pap, screening          Subjective:      Patient ID: Mat Hall is a 39 y o  female  38 yo G0 here for an annual exam  She has a complex history with CF and lung transplant  She has regular monthly cycles about a week long with pain on some days  Flow and pain manageable  She has been on birth control in the distant past  She is  and monogamous for the last 23 years, adopted 5year old this last year  She is sexually active, occasionally feels she may have some trauma with sex and subsequent bleeding due to pain at time, not every time  Discussed reasons to call for postcoital bleeding unrelated to trauma or repeat events  The following portions of the patient's history were reviewed and updated as appropriate: allergies, current medications, past family history, past medical history, past social history, past surgical history and problem list     Review of Systems   Constitutional: Negative for chills and fever  HENT: Negative for ear pain and sore throat  Eyes: Negative for pain and visual disturbance  Respiratory: Negative for cough and shortness of breath  Cardiovascular: Negative for chest pain and palpitations  Gastrointestinal: Negative for abdominal pain, constipation, diarrhea, nausea and vomiting  Genitourinary: Positive for vaginal bleeding (first day of cycle)  Negative for dyspareunia, dysuria, frequency, hematuria, urgency, vaginal discharge and vaginal pain  Musculoskeletal: Negative for arthralgias and back pain  Skin: Negative for color change and rash  Neurological: Negative for seizures and syncope  All other systems reviewed and are negative  Objective:      /80 (BP Location: Left arm, Patient Position: Sitting, Cuff Size: Standard)   Ht 5' 2 5" (1 588 m)   Wt 49 2 kg (108 lb 6 4 oz)   LMP 03/26/2021 (Approximate)   BMI 19 51 kg/m²          Physical Exam  Constitutional:       General: She is not in acute distress  Appearance: She is well-developed  She is not diaphoretic  HENT:      Head: Normocephalic and atraumatic  Neck:      Musculoskeletal: Normal range of motion and neck supple  Thyroid: No thyromegaly  Pulmonary:      Effort: Pulmonary effort is normal    Chest:      Breasts: Breasts are symmetrical          Right: No inverted nipple, mass, nipple discharge, skin change or tenderness  Left: No inverted nipple, mass, nipple discharge, skin change or tenderness  Abdominal:      General: There is no distension  Palpations: Abdomen is soft  There is no mass  Tenderness: There is no abdominal tenderness  There is no guarding or rebound  Genitourinary:     Exam position: Supine  Labia:         Right: No rash, tenderness, lesion or injury  Left: No rash, tenderness, lesion or injury  Vagina: Bleeding present  No vaginal discharge, erythema or tenderness  Cervix: Cervical bleeding present  No cervical motion tenderness, discharge or friability  Uterus: Not enlarged and not tender  Adnexa:         Right: No mass, tenderness or fullness  Left: No mass, tenderness or fullness  Comments: Scant blood from cycle starting  Lymphadenopathy:      Cervical: No cervical adenopathy        Upper Body:      Right upper body: No supraclavicular, axillary or pectoral adenopathy  Left upper body: No supraclavicular, axillary or pectoral adenopathy

## 2021-04-27 DIAGNOSIS — J20.9 ACUTE BRONCHITIS, UNSPECIFIED ORGANISM: ICD-10-CM

## 2021-04-27 RX ORDER — PREDNISONE 20 MG/1
TABLET ORAL
Qty: 18 TABLET | Refills: 0 | Status: SHIPPED | OUTPATIENT
Start: 2021-04-27 | End: 2021-09-26 | Stop reason: HOSPADM

## 2021-04-28 LAB
HPV HR 12 DNA CVX QL NAA+PROBE: NEGATIVE
HPV16 DNA CVX QL NAA+PROBE: NEGATIVE
HPV18 DNA CVX QL NAA+PROBE: NEGATIVE

## 2021-04-29 LAB
LAB AP GYN PRIMARY INTERPRETATION: NORMAL
Lab: NORMAL

## 2021-05-13 ENCOUNTER — VBI (OUTPATIENT)
Dept: ADMINISTRATIVE | Facility: OTHER | Age: 46
End: 2021-05-13

## 2021-06-02 DIAGNOSIS — E84.9 CYSTIC FIBROSIS (HCC): ICD-10-CM

## 2021-06-02 DIAGNOSIS — J98.4 OTHER DISORDERS OF LUNG: ICD-10-CM

## 2021-06-04 DIAGNOSIS — Z11.9 ENCOUNTER FOR SCREENING FOR INFECTIOUS AND PARASITIC DISEASES, UNSPECIFIED: ICD-10-CM

## 2021-06-04 DIAGNOSIS — Z11.9 ENCOUNTER FOR SCREENING FOR INFECTIOUS AND PARASITIC DISEASES, UNSPECIFIED: Primary | ICD-10-CM

## 2021-06-04 PROCEDURE — U0003 INFECTIOUS AGENT DETECTION BY NUCLEIC ACID (DNA OR RNA); SEVERE ACUTE RESPIRATORY SYNDROME CORONAVIRUS 2 (SARS-COV-2) (CORONAVIRUS DISEASE [COVID-19]), AMPLIFIED PROBE TECHNIQUE, MAKING USE OF HIGH THROUGHPUT TECHNOLOGIES AS DESCRIBED BY CMS-2020-01-R: HCPCS | Performed by: FAMILY MEDICINE

## 2021-06-04 PROCEDURE — U0005 INFEC AGEN DETEC AMPLI PROBE: HCPCS | Performed by: FAMILY MEDICINE

## 2021-06-04 RX ORDER — ALPRAZOLAM 1 MG/1
TABLET ORAL
Qty: 60 TABLET | Refills: 2 | Status: SHIPPED | OUTPATIENT
Start: 2021-06-04 | End: 2021-09-19

## 2021-06-04 RX ORDER — DRONABINOL 5 MG/1
5 CAPSULE ORAL 3 TIMES DAILY
Qty: 90 CAPSULE | Refills: 0 | Status: SHIPPED | OUTPATIENT
Start: 2021-06-04 | End: 2021-07-05

## 2021-06-05 LAB — SARS-COV-2 RNA RESP QL NAA+PROBE: NEGATIVE

## 2021-06-10 ENCOUNTER — OFFICE VISIT (OUTPATIENT)
Dept: URGENT CARE | Facility: CLINIC | Age: 46
End: 2021-06-10
Payer: COMMERCIAL

## 2021-06-10 ENCOUNTER — APPOINTMENT (OUTPATIENT)
Dept: RADIOLOGY | Facility: CLINIC | Age: 46
End: 2021-06-10
Payer: COMMERCIAL

## 2021-06-10 VITALS
BODY MASS INDEX: 21.16 KG/M2 | SYSTOLIC BLOOD PRESSURE: 130 MMHG | HEIGHT: 62 IN | TEMPERATURE: 97.8 F | OXYGEN SATURATION: 93 % | WEIGHT: 115 LBS | DIASTOLIC BLOOD PRESSURE: 60 MMHG | RESPIRATION RATE: 20 BRPM | HEART RATE: 96 BPM

## 2021-06-10 DIAGNOSIS — J20.9 ACUTE BRONCHITIS, UNSPECIFIED ORGANISM: ICD-10-CM

## 2021-06-10 DIAGNOSIS — R05.9 COUGH: Primary | ICD-10-CM

## 2021-06-10 DIAGNOSIS — R05.9 COUGH: ICD-10-CM

## 2021-06-10 PROCEDURE — 71046 X-RAY EXAM CHEST 2 VIEWS: CPT

## 2021-06-10 PROCEDURE — 99213 OFFICE O/P EST LOW 20 MIN: CPT | Performed by: NURSE PRACTITIONER

## 2021-06-10 RX ORDER — PREDNISONE 10 MG/1
TABLET ORAL
Qty: 18 TABLET | Refills: 0 | Status: SHIPPED | OUTPATIENT
Start: 2021-06-10 | End: 2021-06-19

## 2021-06-10 RX ORDER — CEFDINIR 300 MG/1
300 CAPSULE ORAL EVERY 12 HOURS SCHEDULED
Qty: 20 CAPSULE | Refills: 0 | Status: SHIPPED | OUTPATIENT
Start: 2021-06-10 | End: 2021-06-20

## 2021-06-10 RX ORDER — HYDROCODONE BITARTRATE AND ACETAMINOPHEN 5; 325 MG/1; MG/1
1 TABLET ORAL EVERY 6 HOURS PRN
Qty: 10 TABLET | Refills: 0 | Status: SHIPPED | OUTPATIENT
Start: 2021-06-10 | End: 2021-09-26 | Stop reason: HOSPADM

## 2021-06-10 NOTE — PATIENT INSTRUCTIONS
Will treat for acute bronchitis  No acute abnormality on x-ray  Will call you if final read is different  Rest and drink extra fluids  Start antibiotic  Take probiotic  Start prednisone taper as prescribed  Continue to use your inhaler as needed Would recommend you follow-up with your PCP in 2-3 days  Would also recommend you call your lung transplant team the so they are aware of the new medication and treatment plan  As we discussed would recommend he go to the ER if you have any worsening symptoms, chest pain, shortness of breath or difficulty breathing, lethargy, confusion dehydration

## 2021-06-10 NOTE — TELEPHONE ENCOUNTER
Dr Harpreet Napier called requesting someone send over a few hydrocodone  Patient was seen at the [facility] and Dr Harpreet Napier received call requesting this medication be called in for her however he does not have access to the system at this time  Patient has bronchitis with a history of CF and lung transplant

## 2021-06-10 NOTE — PROGRESS NOTES
[facility]        NAME: Prabhakar Cameron is a 39 y o  female  : 1975    MRN: [id number]  DATE: Cece 10, 2021  TIME: 11:02 AM    Assessment and Plan   Cough [R05]  1  Cough  XR chest pa & lateral   2  Acute bronchitis, unspecified organism  predniSONE 10 mg tablet    cefdinir (OMNICEF) 300 mg capsule         Patient Instructions     Patient Instructions   Will treat for acute bronchitis  No acute abnormality on x-ray  Will call you if final read is different  Rest and drink extra fluids  Start antibiotic  Take probiotic  Start prednisone taper as prescribed  Continue to use your inhaler as needed Would recommend you follow-up with your PCP in 2-3 days  Would also recommend you call your lung transplant team the so they are aware of the new medication and treatment plan  As we discussed would recommend he go to the ER if you have any worsening symptoms, chest pain, shortness of breath or difficulty breathing, lethargy, confusion dehydration  Chief Complaint     Chief Complaint   Patient presents with    Cough     Neg  Covid Test on Tuesday     Shortness of Breath    Wheezing         History of Present Illness   Miguelina Barrett presents to the clinic c/o    This is a 72-year-old female here today with complaints of cough, congestion  She states symptoms started 5 days ago  She was tested for COVID and test came back negative  No fevers at this time  She does have shortness of breath and wheezing  She has a significant history of to lung transplants and cystic fibrosis  She states in the past she has had similar symptoms  She was started on antibiotics at this time  She is vaccinated for COVID-19  She denies any known exposure to COVID  No ear pain or sore throat  She has been using her inhaler  She also notes she has some left-sided lateral rib pain due to coughing  Reviewed patient's chart  Patient had similar symptoms in April    She was treated with Omnicef and prednisone  She states that symptoms seemed to be similar  She does have close follow-up with the lung transplant team and she will call and discussed with them also  Review of Systems   Review of Systems   Constitutional: Negative for activity change, chills, fatigue and fever  HENT: Positive for congestion and rhinorrhea  Respiratory: Positive for cough, shortness of breath and wheezing  Cardiovascular: Negative for chest pain and palpitations  Gastrointestinal: Negative  Neurological: Negative  Psychiatric/Behavioral: Negative            Current Medications     Long-Term Medications   Medication Sig Dispense Refill    ALPRAZolam (XANAX) 1 mg tablet TAKE 1 TABLET BY MOUTH TWICE A DAY 60 tablet 2    apixaban (ELIQUIS) 5 mg Take 10 mg by mouth 2 (two) times a day      dronabinol (MARINOL) 5 MG capsule TAKE 1 CAPSULE (5 MG TOTAL) BY MOUTH 3 (THREE) TIMES A DAY 90 capsule 0    escitalopram (LEXAPRO) 10 mg tablet Take 10 mg by mouth daily      famotidine (PEPCID) 20 mg tablet Take 20 mg by mouth      insulin aspart (NovoLOG) 100 units/mL injection Inject under the skin 3 (three) times a day before meals      ondansetron (ZOFRAN-ODT) 4 mg disintegrating tablet Take 1 tablet (4 mg total) by mouth every 6 (six) hours as needed for nausea or vomiting 20 tablet 0    polyethylene glycol (MIRALAX) 17 g packet Take 17 g by mouth 2 (two) times a day as needed      traZODone (DESYREL) 50 mg tablet Take 25 mg by mouth      valACYclovir (VALTREX) 500 mg tablet Take 500 mg by mouth daily         Current Allergies     Allergies as of 06/10/2021 - Reviewed 06/10/2021   Allergen Reaction Noted    Vancomycin Angioedema and Hives 04/26/2011    Ceftazidime Headache 10/28/2010            The following portions of the patient's history were reviewed and updated as appropriate: allergies, current medications, past family history, past medical history, past social history, past surgical history and problem list     Objective   /60   Pulse 96   Temp 97 8 °F (36 6 °C) (Temporal)   Resp 20   Ht 5' 2" (1 575 m)   Wt 52 2 kg (115 lb)   SpO2 93%   BMI 21 03 kg/m²        Physical Exam     Physical Exam  Vitals signs and nursing note reviewed  Constitutional:       General: She is not in acute distress  Appearance: She is well-developed  She is not ill-appearing or toxic-appearing  Interventions: She is not intubated  HENT:      Right Ear: Tympanic membrane normal       Left Ear: Tympanic membrane normal       Nose: Congestion present  Mouth/Throat:      Pharynx: No posterior oropharyngeal erythema  Neck:      Musculoskeletal: Normal range of motion  Cardiovascular:      Rate and Rhythm: Normal rate and regular rhythm  Pulmonary:      Effort: Respiratory distress present  No tachypnea or accessory muscle usage  She is not intubated  Breath sounds: Examination of the right-upper field reveals decreased breath sounds and rales  Examination of the right-middle field reveals decreased breath sounds  Examination of the right-lower field reveals decreased breath sounds  Decreased breath sounds and rales present  Comments: Left lung is clear (which is functioning lung)  Right side has some crackles  Neurological:      General: No focal deficit present  Mental Status: She is alert and oriented to person, place, and time     Psychiatric:         Mood and Affect: Mood normal          Behavior: Behavior normal

## 2021-06-13 ENCOUNTER — HOSPITAL ENCOUNTER (EMERGENCY)
Facility: HOSPITAL | Age: 46
Discharge: ANOTHER HOSPITAL | End: 2021-06-13
Attending: EMERGENCY MEDICINE | Admitting: EMERGENCY MEDICINE
Payer: COMMERCIAL

## 2021-06-13 ENCOUNTER — APPOINTMENT (EMERGENCY)
Dept: RADIOLOGY | Facility: HOSPITAL | Age: 46
End: 2021-06-13
Payer: COMMERCIAL

## 2021-06-13 VITALS
BODY MASS INDEX: 20.97 KG/M2 | DIASTOLIC BLOOD PRESSURE: 87 MMHG | OXYGEN SATURATION: 95 % | HEART RATE: 87 BPM | RESPIRATION RATE: 28 BRPM | TEMPERATURE: 98 F | SYSTOLIC BLOOD PRESSURE: 149 MMHG | WEIGHT: 114.64 LBS

## 2021-06-13 DIAGNOSIS — R09.02 HYPOXIA: Primary | ICD-10-CM

## 2021-06-13 LAB
ALBUMIN SERPL BCP-MCNC: 3.6 G/DL (ref 3.5–5)
ALP SERPL-CCNC: 113 U/L (ref 46–116)
ALT SERPL W P-5'-P-CCNC: 95 U/L (ref 12–78)
ANION GAP SERPL CALCULATED.3IONS-SCNC: 14 MMOL/L (ref 4–13)
APTT PPP: 34 SECONDS (ref 23–37)
AST SERPL W P-5'-P-CCNC: 142 U/L (ref 5–45)
BACTERIA UR QL AUTO: ABNORMAL /HPF
BASOPHILS # BLD AUTO: 0.01 THOUSANDS/ΜL (ref 0–0.1)
BASOPHILS NFR BLD AUTO: 0 % (ref 0–1)
BILIRUB SERPL-MCNC: 0.42 MG/DL (ref 0.2–1)
BILIRUB UR QL STRIP: NEGATIVE
BUN SERPL-MCNC: 32 MG/DL (ref 5–25)
CALCIUM SERPL-MCNC: 8.2 MG/DL (ref 8.3–10.1)
CHLORIDE SERPL-SCNC: 98 MMOL/L (ref 100–108)
CLARITY UR: CLEAR
CO2 SERPL-SCNC: 19 MMOL/L (ref 21–32)
COLOR UR: YELLOW
CREAT SERPL-MCNC: 1.48 MG/DL (ref 0.6–1.3)
EOSINOPHIL # BLD AUTO: 0.01 THOUSAND/ΜL (ref 0–0.61)
EOSINOPHIL NFR BLD AUTO: 0 % (ref 0–6)
ERYTHROCYTE [DISTWIDTH] IN BLOOD BY AUTOMATED COUNT: 14.9 % (ref 11.6–15.1)
GFR SERPL CREATININE-BSD FRML MDRD: 43 ML/MIN/1.73SQ M
GLUCOSE SERPL-MCNC: 382 MG/DL (ref 65–140)
GLUCOSE UR STRIP-MCNC: ABNORMAL MG/DL
HCT VFR BLD AUTO: 41.5 % (ref 34.8–46.1)
HGB BLD-MCNC: 12.5 G/DL (ref 11.5–15.4)
HGB UR QL STRIP.AUTO: ABNORMAL
IMM GRANULOCYTES # BLD AUTO: 0.04 THOUSAND/UL (ref 0–0.2)
IMM GRANULOCYTES NFR BLD AUTO: 0 % (ref 0–2)
INR PPP: 1.17 (ref 0.84–1.19)
KETONES UR STRIP-MCNC: NEGATIVE MG/DL
LACTATE SERPL-SCNC: 4 MMOL/L (ref 0.5–2)
LEUKOCYTE ESTERASE UR QL STRIP: ABNORMAL
LYMPHOCYTES # BLD AUTO: 0.89 THOUSANDS/ΜL (ref 0.6–4.47)
LYMPHOCYTES NFR BLD AUTO: 10 % (ref 14–44)
MCH RBC QN AUTO: 27.2 PG (ref 26.8–34.3)
MCHC RBC AUTO-ENTMCNC: 30.1 G/DL (ref 31.4–37.4)
MCV RBC AUTO: 90 FL (ref 82–98)
MONOCYTES # BLD AUTO: 0.19 THOUSAND/ΜL (ref 0.17–1.22)
MONOCYTES NFR BLD AUTO: 2 % (ref 4–12)
NEUTROPHILS # BLD AUTO: 8.18 THOUSANDS/ΜL (ref 1.85–7.62)
NEUTS SEG NFR BLD AUTO: 88 % (ref 43–75)
NITRITE UR QL STRIP: NEGATIVE
NON-SQ EPI CELLS URNS QL MICRO: ABNORMAL /HPF
NRBC BLD AUTO-RTO: 0 /100 WBCS
PH UR STRIP.AUTO: 5.5 [PH]
PLATELET # BLD AUTO: 166 THOUSANDS/UL (ref 149–390)
PMV BLD AUTO: 9.9 FL (ref 8.9–12.7)
POTASSIUM SERPL-SCNC: 5.3 MMOL/L (ref 3.5–5.3)
PROT SERPL-MCNC: 8.2 G/DL (ref 6.4–8.2)
PROT UR STRIP-MCNC: NEGATIVE MG/DL
PROTHROMBIN TIME: 14.4 SECONDS (ref 11.6–14.5)
RBC # BLD AUTO: 4.6 MILLION/UL (ref 3.81–5.12)
RBC #/AREA URNS AUTO: ABNORMAL /HPF
SARS-COV-2 RNA RESP QL NAA+PROBE: NEGATIVE
SODIUM SERPL-SCNC: 131 MMOL/L (ref 136–145)
SP GR UR STRIP.AUTO: 1.02 (ref 1–1.03)
UROBILINOGEN UR QL STRIP.AUTO: 0.2 E.U./DL
WBC # BLD AUTO: 9.32 THOUSAND/UL (ref 4.31–10.16)
WBC #/AREA URNS AUTO: ABNORMAL /HPF

## 2021-06-13 PROCEDURE — 81001 URINALYSIS AUTO W/SCOPE: CPT | Performed by: EMERGENCY MEDICINE

## 2021-06-13 PROCEDURE — 85610 PROTHROMBIN TIME: CPT | Performed by: EMERGENCY MEDICINE

## 2021-06-13 PROCEDURE — U0003 INFECTIOUS AGENT DETECTION BY NUCLEIC ACID (DNA OR RNA); SEVERE ACUTE RESPIRATORY SYNDROME CORONAVIRUS 2 (SARS-COV-2) (CORONAVIRUS DISEASE [COVID-19]), AMPLIFIED PROBE TECHNIQUE, MAKING USE OF HIGH THROUGHPUT TECHNOLOGIES AS DESCRIBED BY CMS-2020-01-R: HCPCS | Performed by: EMERGENCY MEDICINE

## 2021-06-13 PROCEDURE — 85730 THROMBOPLASTIN TIME PARTIAL: CPT | Performed by: EMERGENCY MEDICINE

## 2021-06-13 PROCEDURE — 84145 PROCALCITONIN (PCT): CPT | Performed by: EMERGENCY MEDICINE

## 2021-06-13 PROCEDURE — 96375 TX/PRO/DX INJ NEW DRUG ADDON: CPT

## 2021-06-13 PROCEDURE — U0005 INFEC AGEN DETEC AMPLI PROBE: HCPCS | Performed by: EMERGENCY MEDICINE

## 2021-06-13 PROCEDURE — 85025 COMPLETE CBC W/AUTO DIFF WBC: CPT | Performed by: EMERGENCY MEDICINE

## 2021-06-13 PROCEDURE — 96367 TX/PROPH/DG ADDL SEQ IV INF: CPT

## 2021-06-13 PROCEDURE — 80053 COMPREHEN METABOLIC PANEL: CPT | Performed by: EMERGENCY MEDICINE

## 2021-06-13 PROCEDURE — 96365 THER/PROPH/DIAG IV INF INIT: CPT

## 2021-06-13 PROCEDURE — 83605 ASSAY OF LACTIC ACID: CPT | Performed by: EMERGENCY MEDICINE

## 2021-06-13 PROCEDURE — 93005 ELECTROCARDIOGRAM TRACING: CPT

## 2021-06-13 PROCEDURE — 99285 EMERGENCY DEPT VISIT HI MDM: CPT

## 2021-06-13 PROCEDURE — 99285 EMERGENCY DEPT VISIT HI MDM: CPT | Performed by: EMERGENCY MEDICINE

## 2021-06-13 PROCEDURE — 71045 X-RAY EXAM CHEST 1 VIEW: CPT

## 2021-06-13 PROCEDURE — 36415 COLL VENOUS BLD VENIPUNCTURE: CPT | Performed by: EMERGENCY MEDICINE

## 2021-06-13 PROCEDURE — 87040 BLOOD CULTURE FOR BACTERIA: CPT | Performed by: EMERGENCY MEDICINE

## 2021-06-13 RX ORDER — LINEZOLID 2 MG/ML
600 INJECTION, SOLUTION INTRAVENOUS ONCE
Status: COMPLETED | OUTPATIENT
Start: 2021-06-13 | End: 2021-06-13

## 2021-06-13 RX ORDER — FENTANYL CITRATE 50 UG/ML
75 INJECTION, SOLUTION INTRAMUSCULAR; INTRAVENOUS ONCE
Status: COMPLETED | OUTPATIENT
Start: 2021-06-13 | End: 2021-06-13

## 2021-06-13 RX ORDER — HYDROMORPHONE HCL/PF 1 MG/ML
0.5 SYRINGE (ML) INJECTION ONCE
Status: COMPLETED | OUTPATIENT
Start: 2021-06-13 | End: 2021-06-13

## 2021-06-13 RX ADMIN — LINEZOLID 600 MG: 600 INJECTION, SOLUTION INTRAVENOUS at 19:05

## 2021-06-13 RX ADMIN — SODIUM CHLORIDE 500 ML: 0.9 INJECTION, SOLUTION INTRAVENOUS at 17:31

## 2021-06-13 RX ADMIN — MEROPENEM 1000 MG: 1 INJECTION INTRAVENOUS at 17:51

## 2021-06-13 RX ADMIN — HYDROMORPHONE HYDROCHLORIDE 0.5 MG: 1 INJECTION, SOLUTION INTRAMUSCULAR; INTRAVENOUS; SUBCUTANEOUS at 19:06

## 2021-06-13 RX ADMIN — FENTANYL CITRATE 75 MCG: 50 INJECTION INTRAMUSCULAR; INTRAVENOUS at 17:31

## 2021-06-13 RX ADMIN — SODIUM CHLORIDE 1000 ML: 0.9 INJECTION, SOLUTION INTRAVENOUS at 18:14

## 2021-06-13 NOTE — ED NOTES
Multiple attempts to call report to [facility] @ [phone]  Initially told patient was not accepted and that bed 1122 was a blocked bed  Informed staff pt was already en route  Spoke with transfer center at [address] [place] [address] who then assigned pt to room 1122 that she "was never put on their board but she is accepted to that bed, they should take report "     Attempted to call report again and was placed on hold that "patient literally just showed up so our charge nurse has to figure it out "     Report still not given as staff continue to place call on hold       Fausto Albrecht RN  06/13/21 [id number]

## 2021-06-13 NOTE — EMTALA/ACUTE CARE TRANSFER
[address] [facility] [address]  [address] [facility] [address]  Dept: [phone]      EMTALA TRANSFER CONSENT    NAME Gurjit Fortune                                         1975                              MRN [id number]    I have been informed of my rights regarding examination, treatment, and transfer   by Dr Candida Falk MD    Benefits: Specialized equipment and/or services available at the receiving facility (Include comment)________________________    Risks: Potential for delay in receiving treatment, Loss of IV, Increased discomfort during transfer      Transfer Request   I acknowledge that my medical condition has been evaluated and explained to me by the emergency department physician or other qualified medical person and/or my attending physician who has recommended and offered to me further medical examination and treatment  I understand the Hospital's obligation with respect to the treatment and stabilization of my emergency medical condition  I nevertheless request to be transferred  I release the Hospital, the doctor, and any other persons caring for me from all responsibility or liability for any injury or ill effects that may result from my transfer and agree to accept all responsibility for the consequences of my choice to transfer, rather than receive stabilizing treatment at the Hospital  I understand that because the transfer is my request, my insurance may not provide reimbursement for the services  The Hospital will assist and direct me and my family in how to make arrangements for transfer, but the hospital is not liable for any fees charged by the transport service  In spite of this understanding, I refuse to consent to further medical examination and treatment which has been offered to me, and request transfer to  [facility] Name, [address] : [place]   I authorize the performance of emergency medical procedures and treatments upon me in both transit and upon arrival at the receiving facility  Additionally, I authorize the release of any and all medical records to the receiving facility and request they be transported with me, if possible  I authorize the performance of emergency medical procedures and treatments upon me in both transit and upon arrival at the receiving facility  Additionally, I authorize the release of any and all medical records to the receiving facility and request they be transported with me, if possible  I understand that the safest mode of transportation during a medical emergency is an ambulance and that the Hospital advocates the use of this mode of transport  Risks of traveling to the receiving facility by car, including absence of medical control, life sustaining equipment, such as oxygen, and medical personnel has been explained to me and I fully understand them  (JUSTIN CORRECT BOX BELOW)  [  ]  I consent to the stated transfer and to be transported by ambulance/helicopter  [  ]  I consent to the stated transfer, but refuse transportation by ambulance and accept full responsibility for my transportation by car  I understand the risks of non-ambulance transfers and I exonerate the Hospital and its staff from any deterioration in my condition that results from this refusal     X___________________________________________    DATE  21  TIME________  Signature of patient or legally responsible individual signing on patient behalf           RELATIONSHIP TO PATIENT_________________________          Provider Certification    NAME Neyda Juarez                                         1975                              MRN [id number]    A medical screening exam was performed on the above named patient  Based on the examination:    Condition Necessitating Transfer The encounter diagnosis was Hypoxia      Patient Condition: The patient has been stabilized such that within reasonable medical probability, no material deterioration of the patient condition or the condition of the unborn child(joseph) is likely to result from the transfer    Reason for Transfer: Level of Care needed not available at this facility (transplant team)    Transfer Requirements: [address] [place] [address]   · Space available and qualified personnel available for treatment as acknowledged by    · Agreed to accept transfer and to provide appropriate medical treatment as acknowledged by       Dr Tyrone Garcia   · Appropriate medical records of the examination and treatment of the patient are provided at the time of transfer   [address] [facility], [address] _______  · Transfer will be performed by qualified personnel from    and appropriate transfer equipment as required, including the use of necessary and appropriate life support measures  Provider Certification: I have examined the patient and explained the following risks and benefits of being transferred/refusing transfer to the patient/family:  General risk, such as traffic hazards, adverse weather conditions, rough terrain or turbulence, possible failure of equipment (including vehicle or aircraft), or consequences of actions of persons outside the control of the transport personnel      Based on these reasonable risks and benefits to the patient and/or the unborn child(joseph), and based upon the information available at the time of the patients examination, I certify that the medical benefits reasonably to be expected from the provision of appropriate medical treatments at another medical facility outweigh the increasing risks, if any, to the individuals medical condition, and in the case of labor to the unborn child, from effecting the transfer      X____________________________________________ DATE 06/13/21        TIME_______      ORIGINAL - SEND TO MEDICAL RECORDS   COPY - SEND WITH PATIENT DURING TRANSFER

## 2021-06-14 LAB
ATRIAL RATE: 96 BPM
P AXIS: 62 DEGREES
PR INTERVAL: 140 MS
PROCALCITONIN SERPL-MCNC: <0.05 NG/ML
QRS AXIS: 79 DEGREES
QRSD INTERVAL: 74 MS
QT INTERVAL: 356 MS
QTC INTERVAL: 449 MS
T WAVE AXIS: 57 DEGREES
VENTRICULAR RATE: 96 BPM

## 2021-06-14 PROCEDURE — 93010 ELECTROCARDIOGRAM REPORT: CPT | Performed by: INTERNAL MEDICINE

## 2021-06-19 LAB
BACTERIA BLD CULT: NORMAL
BACTERIA BLD CULT: NORMAL

## 2021-06-26 NOTE — ED PROVIDER NOTES
History  Chief Complaint   Patient presents with    Shortness of Breath     increased SOB and has had low O2 at home in 60's  O2 84% on R/A     38 yo f  With hx of CF presenting to the emergency department for eval of sob  Pt is s/p lung transplant on immunosuppressive therapy and gets frequent complicated PNA  Pt has been feeling increasingly sob over the past week  Was started on oral abx per her PCP and had been improving until this AM when she woke up feeling very SOB  Checked her pulse ox at home and had reportedly an spO2 in the 60s, here 84% on RA  Prior to Admission Medications   Prescriptions Last Dose Informant Patient Reported? Taking?    ALPRAZolam (XANAX) 1 mg tablet   No No   Sig: TAKE 1 TABLET BY MOUTH TWICE A DAY   Cannabinoids (THC FREE) 20 MG/ML LIQD  Self Yes No   Sig: Take by mouth   HYDROcodone-acetaminophen (NORCO) 5-325 mg per tablet   No No   Sig: Take 1 tablet by mouth every 6 (six) hours as needed for painMax Daily Amount: 4 tablets   Multiple Vitamin (MULTIVITAMIN) tablet  Self Yes No   Sig: Take 1 tablet by mouth daily   NON FORMULARY  Self Yes No   Sig: Discontinue supplemental oxygen   albuterol (ACCUNEB) 1 25 MG/3ML nebulizer solution  Self Yes No   Sig: Take 1 ampule by nebulization every 4 (four) hours as needed for wheezing   alendronate (FOSAMAX) 70 mg tablet  Self Yes No   Sig: Take 70 mg by mouth every 7 days   apixaban (ELIQUIS) 5 mg  Self Yes No   Sig: Take 10 mg by mouth 2 (two) times a day   atovaquone (MEPRON) 750 mg/5 mL suspension  Self Yes No   Sig: Take 1,500 mg by mouth daily   cefdinir (OMNICEF) 300 mg capsule   No No   Sig: Take 1 capsule (300 mg total) by mouth every 12 (twelve) hours for 10 days   colistimethate (COLY-MYCIN M) inhalation solution  Self Yes No   Sig: Take 150 mg by nebulization every 12 (twelve) hours   dexlansoprazole (DEXILANT) 60 MG capsule  Self Yes No   Sig: Take 60 mg by mouth daily   dronabinol (MARINOL) 5 MG capsule   No No Sig: TAKE 1 CAPSULE (5 MG TOTAL) BY MOUTH 3 (THREE) TIMES A DAY   escitalopram (LEXAPRO) 10 mg tablet  Self Yes No   Sig: Take 10 mg by mouth daily   famotidine (PEPCID) 20 mg tablet  Self Yes No   Sig: Take 20 mg by mouth   ferrous sulfate 325 (65 Fe) mg tablet  Self Yes No   Sig: Take 325 mg by mouth daily with breakfast   insulin aspart (NovoLOG) 100 units/mL injection  Self Yes No   Sig: Inject under the skin 3 (three) times a day before meals   magnesium oxide (MAG-OX) 400 mg  Self Yes No   Sig: Take 400 mg by mouth 2 (two) times a day   ondansetron (ZOFRAN-ODT) 4 mg disintegrating tablet  Self No No   Sig: Take 1 tablet (4 mg total) by mouth every 6 (six) hours as needed for nausea or vomiting   pancrelipase, Lip-Prot-Amyl, (CREON) 24,000 units  Self Yes No   Sig: Take 48,000 Units by mouth 3 (three) times a day with meals   polyethylene glycol (MIRALAX) 17 g packet  Self Yes No   Sig: Take 17 g by mouth 2 (two) times a day as needed   predniSONE 10 mg tablet  Self Yes No   Sig: Take 10 mg by mouth daily   predniSONE 10 mg tablet   No No   Sig: Take 3 tablets (30 mg total) by mouth daily for 3 days, THEN 2 tablets (20 mg total) daily for 3 days, THEN 1 tablet (10 mg total) daily for 3 days     predniSONE 20 mg tablet   No No   Sig: TAKE 1 TAB 3X/DAY X 3 DAYS, 1 TAB TWICE DAILY X 3 DAYS, 1 TAB DAILY X 3 DAYS   tacrolimus (PROGRAF) 5 mg capsule  Self Yes No   Sig: Take 2 5 mg by mouth daily with breakfast   tacrolimus (PROGRAF) 5 mg capsule  Self Yes No   Sig: Take 3 mg by mouth daily with dinner   traZODone (DESYREL) 50 mg tablet  Self Yes No   Sig: Take 25 mg by mouth   valACYclovir (VALTREX) 500 mg tablet  Self Yes No   Sig: Take 500 mg by mouth daily      Facility-Administered Medications: None       Past Medical History:   Diagnosis Date    Asthma     Chronic pain     Cystic fibrosis (HCC)     Diabetes mellitus (HCC)     GERD (gastroesophageal reflux disease)     HBP (high blood pressure)     Kidney stone     PONV (postoperative nausea and vomiting)     Transplant recipient     Lung- 2013, 2017       Past Surgical History:   Procedure Laterality Date    APPENDECTOMY      BREAST SURGERY      Aug      BRONCHOSCOPY      CHOLECYSTECTOMY      COLONOSCOPY      EGD  07/2019    Yeast Infect   FL RETROGRADE PYELOGRAM  1/9/2020    FL RETROGRADE PYELOGRAM  2/4/2020    GASTROSTOMY TUBE, PLACE      LUNG SURGERY      LUNG TRANSPLANT      X2    NE CYSTO/URETERO W/LITHOTRIPSY &INDWELL STENT INSRT Right 2/4/2020    Procedure: CYSTOSCOPY URETEROSCOPY WITH LITHOTRIPSY HOLMIUM LASER, RETROGRADE PYELOGRAM AND INSERTION STENT URETERAL;  Surgeon: Benjamin De Paz MD;  Location: [facility];  Service: Urology    NE CYSTOURETHROSCOPY,URETER CATHETER Right 1/9/2020    Procedure: CYSTOSCOPY RETROGRADE PYELOGRAM WITH INSERTION STENT URETERAL;  Surgeon: Benjamin De Paz MD;  Location: [facility];  Service: Urology       Family History   Problem Relation Age of Onset    Alcohol abuse Mother     Mental illness Mother     Alcohol abuse Father     COPD Maternal Grandmother     COPD Maternal Grandfather     COPD Paternal Grandmother     Breast cancer Neg Hx     Ovarian cancer Neg Hx     Colon cancer Neg Hx      I have reviewed and agree with the history as documented  E-Cigarette/Vaping    E-Cigarette Use Never User      E-Cigarette/Vaping Substances     Social History     Tobacco Use    Smoking status: Former Smoker     Packs/day: 0 50     Years: 12 00     Pack years: 6 00    Smokeless tobacco: Never Used   Vaping Use    Vaping Use: Never used   Substance Use Topics    Alcohol use: Yes     Comment: social    Drug use: Yes     Frequency: 2 0 times per week     Types: Marijuana     Comment: medical edible marijuana prescribed       Review of Systems   Constitutional: Negative for appetite change, chills, fatigue and fever  HENT: Negative for sneezing and sore throat      Eyes: Negative for visual disturbance  Respiratory: Positive for cough and shortness of breath  Negative for choking, chest tightness and wheezing  Cardiovascular: Negative for chest pain and palpitations  Gastrointestinal: Negative for abdominal pain, constipation, diarrhea, nausea and vomiting  Genitourinary: Negative for difficulty urinating and dysuria  Neurological: Negative for dizziness, weakness, light-headedness, numbness and headaches  All other systems reviewed and are negative  Physical Exam  Physical Exam  Constitutional:       General: She is not in acute distress  Appearance: She is well-developed  She is not diaphoretic  HENT:      Head: Normocephalic and atraumatic  Eyes:      Pupils: Pupils are equal, round, and reactive to light  Neck:      Vascular: No JVD  Trachea: No tracheal deviation  Cardiovascular:      Rate and Rhythm: Normal rate and regular rhythm  Heart sounds: Normal heart sounds  No murmur heard  No friction rub  No gallop  Pulmonary:      Effort: Pulmonary effort is normal  No respiratory distress  Breath sounds: Wheezing and rales present  Abdominal:      General: Bowel sounds are normal  There is no distension  Palpations: Abdomen is soft  Tenderness: There is no abdominal tenderness  There is no guarding or rebound  Skin:     General: Skin is warm and dry  Coloration: Skin is not pale  Neurological:      Mental Status: She is alert and oriented to person, place, and time  Cranial Nerves: No cranial nerve deficit  Motor: No abnormal muscle tone     Psychiatric:         Behavior: Behavior normal          Vital Signs  ED Triage Vitals   Temperature Pulse Respirations Blood Pressure SpO2   06/13/21 1652 06/13/21 1651 06/13/21 1651 06/13/21 1651 06/13/21 1651   98 °F (36 7 °C) 101 20 (!) 181/88 (!) 84 %      Temp Source Heart Rate Source Patient Position - Orthostatic VS BP Location FiO2 (%)   06/13/21 1652 -- 06/13/21 1651 06/13/21 1651 --   Tympanic  Sitting Left arm       Pain Score       06/13/21 1731       7           Vitals:    06/13/21 1651 06/13/21 1730 06/13/21 1800 06/13/21 1830   BP: (!) 181/88 140/89 124/88 149/87   Pulse: 101 88 85 87   Patient Position - Orthostatic VS: Sitting            Visual Acuity      ED Medications  Medications   sodium chloride 0 9 % bolus 500 mL (0 mL Intravenous Stopped 6/13/21 1914)   fentanyl citrate (PF) 100 MCG/2ML 75 mcg (75 mcg Intravenous Given 6/13/21 1731)   linezolid (ZYVOX) IVPB (premix in dextrose) 600 mg 300 mL (600 mg Intravenous New Bag 6/13/21 1905)   meropenem (MERREM) 1,000 mg in sodium chloride 0 9 % 100 mL IVPB (0 mg Intravenous Stopped 6/13/21 1905)   sodium chloride 0 9 % bolus 1,000 mL (1,000 mL Intravenous New Bag 6/13/21 1814)   HYDROmorphone (DILAUDID) injection 0 5 mg (0 5 mg Intravenous Given 6/13/21 1906)       Diagnostic Studies  Results Reviewed     Procedure Component Value Units Date/Time    Blood culture #1 [[id number]] Collected: 06/13/21 1744    Lab Status: Final result Specimen: Blood from Arm, Right Updated: 06/19/21 0002     Blood Culture No Growth After 5 Days  Blood culture #2 [[id number]] Collected: 06/13/21 1729    Lab Status: Final result Specimen: Blood from Central Venous Line Updated: 06/19/21 0002     Blood Culture No Growth After 5 Days  Procalcitonin with AM Reflex [[id number]]  (Normal) Collected: 06/13/21 1729    Lab Status: Final result Specimen: Blood from Central Venous Line Updated: 06/14/21 0004     Procalcitonin <0 05 ng/ml     Procalcitonin Reflex [[id number]]     Lab Status: No result Specimen: Blood     Novel Coronavirus (Covid-19),PCR [facility] [[id number]]  (Normal) Collected: 06/13/21 1813    Lab Status: Final result Specimen: Nares from Nasopharyngeal Swab Updated: 06/13/21 1918     SARS-CoV-2 Negative    Narrative:       The specimen collection materials, transport medium, and/or testing methodology utilized in the production of these test results have been proven to be reliable in a limited validation with an abbreviated program under the Emergency Utilization Authorization provided by the FDA  Testing reported as "Presumptive positive" will be confirmed with secondary testing to ensure result accuracy  Clinical caution and judgement should be used with the interpretation of these results with consideration of the clinical impression and other laboratory testing  Testing reported as "Positive" or "Negative" has been proven to be accurate according to standard laboratory validation requirements  All testing is performed with control materials showing appropriate reactivity at standard intervals  Lactic acid [[id number]]  (Abnormal) Collected: 06/13/21 1729    Lab Status: Final result Specimen: Blood from Central Venous Line Updated: 06/13/21 1812     LACTIC ACID 4 0 mmol/L     Narrative:      Result may be elevated if tourniquet was used during collection      Comprehensive metabolic panel [[id number]]  (Abnormal) Collected: 06/13/21 1729    Lab Status: Final result Specimen: Blood from Central Venous Line Updated: 06/13/21 1802     Sodium 131 mmol/L      Potassium 5 3 mmol/L      Chloride 98 mmol/L      CO2 19 mmol/L      ANION GAP 14 mmol/L      BUN 32 mg/dL      Creatinine 1 48 mg/dL      Glucose 382 mg/dL      Calcium 8 2 mg/dL       U/L      ALT 95 U/L      Alkaline Phosphatase 113 U/L      Total Protein 8 2 g/dL      Albumin 3 6 g/dL      Total Bilirubin 0 42 mg/dL      eGFR 43 ml/min/1 73sq m     Narrative:      [place] guidelines for Chronic Kidney Disease (CKD):     Stage 1 with normal or high GFR (GFR > 90 mL/min/1 73 square meters)    Stage 2 Mild CKD (GFR = 60-89 mL/min/1 73 square meters)    Stage 3A Moderate CKD (GFR = 45-59 mL/min/1 73 square meters)    Stage 3B Moderate CKD (GFR = 30-44 mL/min/1 73 square meters)    Stage 4 Severe CKD (GFR = 15-29 mL/min/1 73 square meters)    Stage 5 End Stage CKD (GFR <15 mL/min/1 73 square meters)  Note: GFR calculation is accurate only with a steady state creatinine    Protime-INR [[id number]]  (Normal) Collected: 06/13/21 1729    Lab Status: Final result Specimen: Blood from Central Venous Line Updated: 06/13/21 1755     Protime 14 4 seconds      INR 1 17    APTT [[id number]]  (Normal) Collected: 06/13/21 1729    Lab Status: Final result Specimen: Blood from Central Venous Line Updated: 06/13/21 1755     PTT 34 seconds     Urine Microscopic [[id number]]  (Abnormal) Collected: 06/13/21 1731    Lab Status: Final result Specimen: Urine, Clean Catch Updated: 06/13/21 1753     RBC, UA 10-20 /hpf      WBC, UA 2-4 /hpf      Epithelial Cells Occasional /hpf      Bacteria, UA Occasional /hpf     UA w Reflex to Microscopic w Reflex to Culture [[id number]]  (Abnormal) Collected: 06/13/21 1731    Lab Status: Final result Specimen: Urine, Clean Catch Updated: 06/13/21 1747     Color, UA Yellow     Clarity, UA Clear     Specific Gravity, UA 1 025     pH, UA 5 5     Leukocytes, UA Trace     Nitrite, UA Negative     Protein, UA Negative mg/dl      Glucose,  (1/4%) mg/dl      Ketones, UA Negative mg/dl      Urobilinogen, UA 0 2 E U /dl      Bilirubin, UA Negative     Blood, UA Moderate    CBC and differential [[id number]]  (Abnormal) Collected: 06/13/21 1729    Lab Status: Final result Specimen: Blood from Central Venous Line Updated: 06/13/21 1745     WBC 9 32 Thousand/uL      RBC 4 60 Million/uL      Hemoglobin 12 5 g/dL      Hematocrit 41 5 %      MCV 90 fL      MCH 27 2 pg      MCHC 30 1 g/dL      RDW 14 9 %      MPV 9 9 fL      Platelets 219 Thousands/uL      nRBC 0 /100 WBCs      Neutrophils Relative 88 %      Immat GRANS % 0 %      Lymphocytes Relative 10 %      Monocytes Relative 2 %      Eosinophils Relative 0 %      Basophils Relative 0 %      Neutrophils Absolute 8 18 Thousands/µL      Immature Grans Absolute 0 04 Thousand/uL      Lymphocytes Absolute 0 89 Thousands/µL      Monocytes Absolute 0 19 Thousand/µL      Eosinophils Absolute 0 01 Thousand/µL      Basophils Absolute 0 01 Thousands/µL                  XR chest 1 view portable   Final Result by Sea Moses DO (06/14 0914)      Hazy density right midlung favoring subsegmental atelectasis with chronic right basilar bronchiectasis and diminished right lung volume  Hyperinflated left lung  Otherwise, no acute pulmonary disease  Workstation performed: [id number]                    Procedures  Procedures         ED Course                             SBIRT 20yo+      Most Recent Value   SBIRT (22 yo +)   In order to provide better care to our patients, we are screening all of our patients for alcohol and drug use  Would it be okay to ask you these screening questions? Yes Filed at: 06/13/2021 1756   Initial Alcohol Screen: US AUDIT-C    1  How often do you have a drink containing alcohol?  0 Filed at: 06/13/2021 1756   2  How many drinks containing alcohol do you have on a typical day you are drinking? 0 Filed at: 06/13/2021 1756   3a  Male UNDER 65: How often do you have five or more drinks on one occasion? 0 Filed at: 06/13/2021 1756   3b  FEMALE Any Age, or MALE 65+: How often do you have 4 or more drinks on one occassion? 0 Filed at: 06/13/2021 1756   Audit-C Score  0 Filed at: 06/13/2021 1756   NICOLE: How many times in the past year have you    Used an illegal drug or used a prescription medication for non-medical reasons? Never Filed at: 06/13/2021 1756                    MDM  Number of Diagnoses or Management Options  Hypoxia  Diagnosis management comments: 38 yo f with hypoxia, likely PNA, better now and stable on 4LNC  Will check infectious workup, d/w transplant center    Discussed case with pt's transplant team who made abx recommendations and will accept the pt to their care         Disposition  Final diagnoses:   Hypoxia     Time reflects when diagnosis was documented in both MDM as applicable and the Disposition within this note     Time User Action Codes Description Comment    6/13/2021  6:08 PM Erik, [facility] [R09 02] Hypoxia       ED Disposition     ED Disposition Condition Date/Time Comment    Transfer to Another [address] [place] [address] Jun 13, 2021  6:08 PM Franchesca Vasquez should be transferred out to [facility] under the care of Dr Cassidy Ryan MD Documentation      Most Recent Value   Patient Condition  The patient has been stabilized such that within reasonable medical probability, no material deterioration of the patient condition or the condition of the unborn child(joseph) is likely to result from the transfer   Reason for Transfer  Level of Care needed not available at this facility [transplant team]   Benefits of Transfer  Specialized equipment and/or services available at the receiving facility (Include comment)________________________   Risks of Transfer  Potential for delay in receiving treatment, Loss of IV, Increased discomfort during transfer   Accepting Physician  Dr Mario Alberto Simms Name, [address]   Provider Certification  General risk, such as traffic hazards, adverse weather conditions, rough terrain or turbulence, possible failure of equipment (including vehicle or aircraft), or consequences of actions of persons outside the control of the transport personnel      RN Documentation      Most [facility] Name, [address]      Follow-up Information    None         Discharge Medication List as of 6/13/2021  8:04 PM      CONTINUE these medications which have NOT CHANGED    Details   albuterol (ACCUNEB) 1 25 MG/3ML nebulizer solution Take 1 ampule by nebulization every 4 (four) hours as needed for wheezing, Historical Med      alendronate (FOSAMAX) 70 mg tablet Take 70 mg by mouth every 7 days, Historical Med      ALPRAZolam (XANAX) 1 mg tablet TAKE 1 TABLET BY MOUTH TWICE A DAY, Normal apixaban (ELIQUIS) 5 mg Take 10 mg by mouth 2 (two) times a day, Historical Med      atovaquone (MEPRON) 750 mg/5 mL suspension Take 1,500 mg by mouth daily, Historical Med      Cannabinoids (THC FREE) 20 MG/ML LIQD Take by mouth, Historical Med      cefdinir (OMNICEF) 300 mg capsule Take 1 capsule (300 mg total) by mouth every 12 (twelve) hours for 10 days, Starting Thu 6/10/2021, Until Sun 6/20/2021, Normal      colistimethate (COLY-MYCIN M) inhalation solution Take 150 mg by nebulization every 12 (twelve) hours, Historical Med      dexlansoprazole (DEXILANT) 60 MG capsule Take 60 mg by mouth daily, Historical Med      dronabinol (MARINOL) 5 MG capsule TAKE 1 CAPSULE (5 MG TOTAL) BY MOUTH 3 (THREE) TIMES A DAY, Starting Fri 6/4/2021, Normal      escitalopram (LEXAPRO) 10 mg tablet Take 10 mg by mouth daily, Starting Fri 11/27/2020, Historical Med      famotidine (PEPCID) 20 mg tablet Take 20 mg by mouth, Starting Thu 10/15/2020, Historical Med      ferrous sulfate 325 (65 Fe) mg tablet Take 325 mg by mouth daily with breakfast, Historical Med      HYDROcodone-acetaminophen (NORCO) 5-325 mg per tablet Take 1 tablet by mouth every 6 (six) hours as needed for painMax Daily Amount: 4 tablets, Starting Thu 6/10/2021, Normal      insulin aspart (NovoLOG) 100 units/mL injection Inject under the skin 3 (three) times a day before meals, Historical Med      magnesium oxide (MAG-OX) 400 mg Take 400 mg by mouth 2 (two) times a day, Historical Med      Multiple Vitamin (MULTIVITAMIN) tablet Take 1 tablet by mouth daily, Historical Med      NON FORMULARY Discontinue supplemental oxygen, Starting Thu 4/1/2021, Historical Med      ondansetron (ZOFRAN-ODT) 4 mg disintegrating tablet Take 1 tablet (4 mg total) by mouth every 6 (six) hours as needed for nausea or vomiting, Starting Tue 2/23/2021, Print      pancrelipase, Lip-Prot-Amyl, (CREON) 24,000 units Take 48,000 Units by mouth 3 (three) times a day with meals, Historical Med polyethylene glycol (MIRALAX) 17 g packet Take 17 g by mouth 2 (two) times a day as needed, Historical Med      !! predniSONE 10 mg tablet Take 10 mg by mouth daily, Historical Med      !! predniSONE 10 mg tablet Multiple Dosages:Starting Thu 6/10/2021, Last dose on Sat 6/12/2021, THEN Starting Sun 6/13/2021, Last dose on Tue 6/15/2021, THEN Starting Wed 6/16/2021, Last dose on Fri 6/18/2021Take 3 tablets (30 mg total) by mouth daily for 3 days, THEN 2 tablet s (20 mg total) daily for 3 days, THEN 1 tablet (10 mg total) daily for 3 days  , Normal      !! predniSONE 20 mg tablet TAKE 1 TAB 3X/DAY X 3 DAYS, 1 TAB TWICE DAILY X 3 DAYS, 1 TAB DAILY X 3 DAYS, Normal      !! tacrolimus (PROGRAF) 5 mg capsule Take 2 5 mg by mouth daily with breakfast, Historical Med      !! tacrolimus (PROGRAF) 5 mg capsule Take 3 mg by mouth daily with dinner, Historical Med      traZODone (DESYREL) 50 mg tablet Take 25 mg by mouth, Starting Mon 10/19/2020, Historical Med      valACYclovir (VALTREX) 500 mg tablet Take 500 mg by mouth daily, Starting Wed 11/4/2020, Historical Med       !! - Potential duplicate medications found  Please discuss with provider  No discharge procedures on file      PDMP Review     None          ED Provider  Electronically Signed by           Antonio Nash MD  06/25/21 2127

## 2021-07-05 DIAGNOSIS — J98.4 OTHER DISORDERS OF LUNG: ICD-10-CM

## 2021-07-05 RX ORDER — DRONABINOL 5 MG/1
5 CAPSULE ORAL 3 TIMES DAILY
Qty: 90 CAPSULE | Refills: 0 | Status: SHIPPED | OUTPATIENT
Start: 2021-07-05 | End: 2021-08-19

## 2021-08-09 ENCOUNTER — TELEPHONE (OUTPATIENT)
Dept: OBGYN CLINIC | Facility: CLINIC | Age: 46
End: 2021-08-09

## 2021-08-09 DIAGNOSIS — B37.3 YEAST INFECTION OF THE VAGINA: Primary | ICD-10-CM

## 2021-08-09 NOTE — TELEPHONE ENCOUNTER
----- Message from Lopez Mckeon sent at 8/9/2021  4:10 PM EDT -----  Regarding: RE: Prescription Question  Contact: [phone]  Hi,     Unfortunately I am unable to attend appointments at this time aside from my transplant and pulmonary clinic as I require oxygen and am going to [facility] on Wednesday to have testing done to see if I need a 3rd transplant or a pneumonectomy  I am on painkillers therefore I am unable to drive and my  is unable to take more time off of work as he has taken off way too much time already  Is there anything that can be done without seeing a doctor? Thanks,   Tony Loud     ----- Message -----  From: Kellie Lerma  Sent: 8/9/21, 3:55 PM  To: Jimena Fortune  Subject: RE: Prescription Question    Edwin Yao, I would make an appt to have them eval and culture  If you are certain is not an STD, it does not sound like BV to me  You can now schedule appts on [facility] or call office  Thanks         ----- Message -----       From:Maximilian Fortune       Sent:8/9/2021  3:41 PM EDT         To:Qing Lombardi MD    Subject:Prescription Question    Hi,      I've noticed over the past few days that I have a chunky green discharge, there is no odor and I am in no discomfort, I only know that it's there from when I wipe after I use the bathroom  I know it's not an STD or STI or anything like that as I am monogamous with my  and he is monogamous with me as well  I'm also on several new medications as I am pretty sick and was recently hospitalized for respiratory failure so one of these may be the root cause, I also finished several strong IV and oral antibiotics about 4 weeks ago  Should I be taking a prescription for this so that it goes away? Many thanks!    Tony Loud

## 2021-08-10 RX ORDER — FLUCONAZOLE 150 MG/1
150 TABLET ORAL ONCE
Qty: 1 TABLET | Refills: 0 | Status: SHIPPED | OUTPATIENT
Start: 2021-08-10 | End: 2021-08-10

## 2021-08-19 DIAGNOSIS — J98.4 OTHER DISORDERS OF LUNG: ICD-10-CM

## 2021-08-19 RX ORDER — DRONABINOL 5 MG/1
5 CAPSULE ORAL 3 TIMES DAILY
Qty: 90 CAPSULE | Refills: 3 | Status: SHIPPED | OUTPATIENT
Start: 2021-08-19

## 2021-09-07 ENCOUNTER — OFFICE VISIT (OUTPATIENT)
Dept: INTERNAL MEDICINE CLINIC | Facility: CLINIC | Age: 46
End: 2021-09-07
Payer: COMMERCIAL

## 2021-09-07 VITALS
WEIGHT: 117.8 LBS | RESPIRATION RATE: 20 BRPM | SYSTOLIC BLOOD PRESSURE: 130 MMHG | BODY MASS INDEX: 21.68 KG/M2 | DIASTOLIC BLOOD PRESSURE: 82 MMHG | HEIGHT: 62 IN | TEMPERATURE: 97.7 F | OXYGEN SATURATION: 96 % | HEART RATE: 92 BPM

## 2021-09-07 DIAGNOSIS — E84.9 CYSTIC FIBROSIS (HCC): ICD-10-CM

## 2021-09-07 DIAGNOSIS — Z94.2 LUNG TRANSPLANT STATUS, BILATERAL (HCC): ICD-10-CM

## 2021-09-07 DIAGNOSIS — R07.89 OTHER CHEST PAIN: ICD-10-CM

## 2021-09-07 DIAGNOSIS — Z79.4 DIABETES MELLITUS DUE TO UNDERLYING CONDITION WITH CHRONIC KIDNEY DISEASE, WITH LONG-TERM CURRENT USE OF INSULIN, UNSPECIFIED CKD STAGE (HCC): Primary | ICD-10-CM

## 2021-09-07 DIAGNOSIS — Z00.00 HEALTHCARE MAINTENANCE: ICD-10-CM

## 2021-09-07 DIAGNOSIS — R53.83 FATIGUE, UNSPECIFIED TYPE: ICD-10-CM

## 2021-09-07 DIAGNOSIS — E08.22 DIABETES MELLITUS DUE TO UNDERLYING CONDITION WITH CHRONIC KIDNEY DISEASE, WITH LONG-TERM CURRENT USE OF INSULIN, UNSPECIFIED CKD STAGE (HCC): Primary | ICD-10-CM

## 2021-09-07 PROBLEM — J96.20 ACUTE ON CHRONIC RESPIRATORY FAILURE (HCC): Status: RESOLVED | Noted: 2017-03-04 | Resolved: 2021-09-07

## 2021-09-07 PROBLEM — J96.01 ACUTE HYPOXEMIC RESPIRATORY FAILURE (HCC): Status: ACTIVE | Noted: 2019-06-16

## 2021-09-07 PROBLEM — D70.9 NEUTROPENIC FEVER (HCC): Status: ACTIVE | Noted: 2020-09-26

## 2021-09-07 PROBLEM — D84.9 IMMUNOSUPPRESSION (HCC): Status: ACTIVE | Noted: 2017-03-12

## 2021-09-07 PROBLEM — E87.5 HYPERKALEMIA: Status: RESOLVED | Noted: 2018-07-10 | Resolved: 2021-09-07

## 2021-09-07 PROBLEM — J96.20 ACUTE ON CHRONIC RESPIRATORY FAILURE (HCC): Status: ACTIVE | Noted: 2017-03-04

## 2021-09-07 PROBLEM — N18.9 CHRONIC KIDNEY DISEASE: Status: ACTIVE | Noted: 2019-11-12

## 2021-09-07 PROBLEM — R52 ACUTE PAIN: Status: ACTIVE | Noted: 2017-03-06

## 2021-09-07 PROBLEM — D70.9 NEUTROPENIC FEVER (HCC): Status: RESOLVED | Noted: 2020-09-26 | Resolved: 2021-09-07

## 2021-09-07 PROBLEM — B25.9 CYTOMEGALOVIRUS (CMV) VIREMIA (HCC): Status: ACTIVE | Noted: 2017-06-14

## 2021-09-07 PROBLEM — Z03.89 CMV (CYTOMEGALOVIRUS INFECTION) STATUS NEGATIVE: Status: ACTIVE | Noted: 2017-03-01

## 2021-09-07 PROBLEM — E43 SEVERE PROTEIN-CALORIE MALNUTRITION (HCC): Status: ACTIVE | Noted: 2018-06-05

## 2021-09-07 PROBLEM — J10.1 INFLUENZA B: Status: ACTIVE | Noted: 2020-02-23

## 2021-09-07 PROBLEM — N17.9 ACUTE KIDNEY INJURY (HCC): Status: ACTIVE | Noted: 2017-09-08

## 2021-09-07 PROBLEM — I82.409 DVT (DEEP VENOUS THROMBOSIS) (HCC): Status: ACTIVE | Noted: 2018-06-29

## 2021-09-07 PROBLEM — R52 ACUTE PAIN: Status: RESOLVED | Noted: 2017-03-06 | Resolved: 2021-09-07

## 2021-09-07 PROBLEM — R73.9 HYPERGLYCEMIA: Status: RESOLVED | Noted: 2020-01-09 | Resolved: 2021-09-07

## 2021-09-07 PROBLEM — D64.9 ANEMIA: Status: ACTIVE | Noted: 2018-07-09

## 2021-09-07 PROBLEM — R50.81 NEUTROPENIC FEVER (HCC): Status: RESOLVED | Noted: 2020-09-26 | Resolved: 2021-09-07

## 2021-09-07 PROBLEM — D72.819 LEUKOPENIA: Status: ACTIVE | Noted: 2017-03-29

## 2021-09-07 PROBLEM — J96.11 CHRONIC RESPIRATORY FAILURE WITH HYPOXIA (HCC): Status: ACTIVE | Noted: 2021-09-07

## 2021-09-07 PROBLEM — J18.9 HCAP (HEALTHCARE-ASSOCIATED PNEUMONIA): Status: RESOLVED | Noted: 2020-03-06 | Resolved: 2021-09-07

## 2021-09-07 PROBLEM — E87.5 HYPERKALEMIA: Status: ACTIVE | Noted: 2018-07-10

## 2021-09-07 PROBLEM — I10 ESSENTIAL HYPERTENSION: Status: ACTIVE | Noted: 2021-09-07

## 2021-09-07 PROBLEM — J18.9 RECURRENT PNEUMONIA: Status: ACTIVE | Noted: 2021-09-07

## 2021-09-07 PROBLEM — D69.6 THROMBOCYTOPENIA (HCC): Status: ACTIVE | Noted: 2017-03-29

## 2021-09-07 PROBLEM — J10.1 INFLUENZA B: Status: RESOLVED | Noted: 2020-02-23 | Resolved: 2021-09-07

## 2021-09-07 PROBLEM — J96.01 ACUTE HYPOXEMIC RESPIRATORY FAILURE (HCC): Status: RESOLVED | Noted: 2019-06-16 | Resolved: 2021-09-07

## 2021-09-07 PROBLEM — K44.9 HIATAL HERNIA: Status: ACTIVE | Noted: 2019-06-25

## 2021-09-07 PROBLEM — K21.9 GASTROESOPHAGEAL REFLUX DISEASE WITHOUT ESOPHAGITIS: Status: ACTIVE | Noted: 2021-09-07

## 2021-09-07 PROBLEM — N17.9 ACUTE KIDNEY INJURY (HCC): Status: RESOLVED | Noted: 2017-09-08 | Resolved: 2021-09-07

## 2021-09-07 PROBLEM — R50.81 NEUTROPENIC FEVER (HCC): Status: ACTIVE | Noted: 2020-09-26

## 2021-09-07 PROBLEM — R06.02 SOB (SHORTNESS OF BREATH): Status: ACTIVE | Noted: 2018-06-03

## 2021-09-07 PROBLEM — E78.00 HYPERCHOLESTEREMIA: Status: ACTIVE | Noted: 2021-09-07

## 2021-09-07 PROCEDURE — 99214 OFFICE O/P EST MOD 30 MIN: CPT | Performed by: INTERNAL MEDICINE

## 2021-09-07 PROCEDURE — 3008F BODY MASS INDEX DOCD: CPT | Performed by: INTERNAL MEDICINE

## 2021-09-07 RX ORDER — POLYETHYLENE GLYCOL 3350 17 G/17G
17 POWDER, FOR SOLUTION ORAL DAILY
COMMUNITY
Start: 2021-07-01 | End: 2021-09-26 | Stop reason: HOSPADM

## 2021-09-07 RX ORDER — DEXLANSOPRAZOLE 60 MG/1
60 CAPSULE, DELAYED RELEASE ORAL
COMMUNITY
Start: 2021-07-30 | End: 2021-09-26 | Stop reason: HOSPADM

## 2021-09-07 RX ORDER — VALACYCLOVIR HYDROCHLORIDE 500 MG/1
500 TABLET, FILM COATED ORAL DAILY
COMMUNITY
Start: 2021-06-01

## 2021-09-07 RX ORDER — TRAZODONE HYDROCHLORIDE 50 MG/1
25 TABLET ORAL
COMMUNITY
End: 2021-09-07 | Stop reason: SDUPTHER

## 2021-09-07 RX ORDER — FAMOTIDINE 20 MG/1
20 TABLET, FILM COATED ORAL 2 TIMES DAILY
COMMUNITY
End: 2022-03-22 | Stop reason: ALTCHOICE

## 2021-09-07 RX ORDER — OXYCODONE HYDROCHLORIDE 10 MG/1
10 TABLET ORAL
COMMUNITY
Start: 2021-09-02 | End: 2021-09-26 | Stop reason: HOSPADM

## 2021-09-07 RX ORDER — SODIUM CHLORIDE 30 MG/ML INHALATION SOLUTION 30 MG/ML
4 SOLUTION INHALANT 2 TIMES DAILY
COMMUNITY
Start: 2021-07-01 | End: 2022-04-12 | Stop reason: ALTCHOICE

## 2021-09-07 RX ORDER — SOFT LENS DISINFECTANT
SOLUTION, NON-ORAL MISCELLANEOUS
COMMUNITY
Start: 2021-07-16

## 2021-09-07 RX ORDER — TACROLIMUS 1 MG/1
3 CAPSULE ORAL 2 TIMES DAILY
COMMUNITY
Start: 2021-07-01

## 2021-09-07 RX ORDER — PREGABALIN 25 MG/1
25 CAPSULE ORAL 3 TIMES DAILY
COMMUNITY
Start: 2021-09-02 | End: 2021-09-26 | Stop reason: HOSPADM

## 2021-09-07 RX ORDER — NALOXONE HYDROCHLORIDE 4 MG/.1ML
4 SPRAY NASAL
COMMUNITY
Start: 2021-07-02 | End: 2021-09-26 | Stop reason: HOSPADM

## 2021-09-07 RX ORDER — SIMETHICONE 80 MG
80 TABLET,CHEWABLE ORAL
COMMUNITY
Start: 2021-07-01 | End: 2021-09-26 | Stop reason: HOSPADM

## 2021-09-07 RX ORDER — PREDNISONE 10 MG/1
10 TABLET ORAL DAILY
COMMUNITY
Start: 2021-04-27

## 2021-09-07 RX ORDER — ESOMEPRAZOLE MAGNESIUM 40 MG/1
40 CAPSULE, DELAYED RELEASE ORAL DAILY
COMMUNITY
Start: 2021-08-06 | End: 2021-09-26 | Stop reason: HOSPADM

## 2021-09-07 RX ORDER — ACETYLCYSTEINE 200 MG/ML
SOLUTION ORAL; RESPIRATORY (INHALATION)
COMMUNITY
Start: 2021-07-02 | End: 2021-09-26 | Stop reason: HOSPADM

## 2021-09-07 RX ORDER — ALENDRONATE SODIUM 70 MG/1
70 TABLET ORAL
COMMUNITY
Start: 2021-05-25 | End: 2021-09-26 | Stop reason: HOSPADM

## 2021-09-07 NOTE — PROGRESS NOTES
Assessment/Plan:    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, with long-term current use of insulin ([facility])    Lab Results   Component Value Date    HGBA1C 6 6 (H) 04/01/2020     · Will check microalbumin/creatinine urine ratio,  Hemoglobin A1c and lipid panel  ·   Currently on sliding scale insulin with blood sugars ranging from low 100s to 200s-300s  · Will probably need basal-bolus insulin; will follow up A1c  ·  patient states that she will be establishing care with ophthalmology soon  ·  will follow-up in 3 months    Fatigue  ·  Etiology unclear at this time  ·  will check 25 hydroxy vitamin-D, TSH, vitamin B12 levels    Lung transplant status, bilateral ([facility] [address] )  ·  Had undergone bilateral lung transplantation in 2013; About 4 years ago had left lung transplantation done as well  ·  currently follows with lung transplant team over at [facility] with potential plans for doing a right lung re transplant  ·   Has occasional chest wall pain,  Will trial Voltaren gel      C[facility] ([facility]  ·  has had issues with recurrent infections in the past  ·   Takes Creon       Diagnoses and all orders for this visit:    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, with long-term current use of insulin, unspecified CKD stage ([facility])  -     Microalbumin / creatinine urine ratio  -     Hemoglobin A1C; Future    Healthcare maintenance  -     Lipid Panel with Direct LDL reflex; Future  -     Vitamin D 25 hydroxy; Future  -     TSH, 3rd generation with Free T4 reflex; Future  -     Vitamin B12; Future    Other chest pain  -     Diclofenac Sodium (VOLTAREN) 1 %; Apply 2 g topically 4 (four) times a day    Cystic fibrosis ([facility])  -     Vitamin D 25 hydroxy; Future  -     TSH, 3rd generation with Free T4 reflex; Future  -     Vitamin B12; Future    Fatigue, unspecified type  -     Vitamin D 25 hydroxy; Future  -     TSH, 3rd generation with Free T4 reflex;  Future  -     Vitamin B12; Future    Lung transplant status, bilateral ([facility] [address] )    Other orders  -     acetylcysteine (MUCOMYST) 200 mg/mL nebulizer solution; Mix with 2 mL sodium chloride 0 3% nebs & administer twice daily with albuterol via nebulizer  -     esomeprazole (NexIUM) 40 MG capsule; Take 40 mg by mouth daily  -     insulin aspart, w/niacinamide, (FIASP) 100 Units/mL injection pen; Inject 4 unites with breakfast, 5 unites with lunch & dinner  LOW SLIDING SCALE correction with meals & bedtime  -     naloxone (NARCAN) 4 mg/0 1 mL nasal spray; 4 mg into each nostril  -     oxyCODONE (ROXICODONE) 10 MG TABS; Take 10 mg by mouth  -     pregabalin (LYRICA) 25 mg capsule; Take 25 mg by mouth Three times a day  -     Respiratory Therapy Supplies (Nebulizer) Emmie Morrison; Please dispense nebulizer machine  -     Sennosides 17 2 MG TABS; Take 17 2 mg by mouth 2 (two) times a day  -     simethicone (MYLICON) 80 mg chewable tablet; Chew 80 mg (Patient not taking: Reported on 9/7/2021)  -     sodium chloride 3 % inhalation solution; Inhale 4 mL 2 (two) times a day  -     SYRINGE-NEEDLE, DISP, 3 ML 23G X 1" 3 ML MISC; Use twice daily to draw up acetylcysteine  -     albuterol (5 mg/mL) 0 5 % nebulizer solution; Inhale 2 5 mg 2 (two) times a day  -     alendronate (FOSAMAX) 70 mg tablet; Take 70 mg by mouth every 7 days  -     dexlansoprazole (DEXILANT) 60 MG capsule; Take 60 mg by mouth  -     pancrelipase, Lip-Prot-Amyl, (CREON) 24,000 units; Take 2 capsules by mouth  -     famotidine (PEPCID) 20 mg tablet; Take 20 mg by mouth 2 (two) times a day  -     polyethylene glycol (GLYCOLAX) 17 GM/SCOOP powder; Take 17 g by mouth daily  -     predniSONE 10 mg tablet; Take 10 mg by mouth daily  -     tacrolimus (PROGRAF) 1 mg capsule; Take 3 mg by mouth 2 (two) times a day  -     Discontinue: traZODone (DESYREL) 50 mg tablet; Take 25 mg by mouth  -     valACYclovir (VALTREX) 500 mg tablet;  Take 500 mg by mouth daily          Subjective:      Patient ID: Danica Morel is a 39 y o  female  HPI     I had the pleasure of seeing Jamil Otero in to our office today to establish care  She has history of cystic fibrosis,  Has history of bilateral lung transplantation in 2013, is currently more than 4 5 years after redo of left lung transplant  She has  had recurrent hospitalizations for respiratory failure with severe hypoxemia in the past years  she follows with lung transplant team over at [facility] and currently there is discussion whether to perform right-sided pneumonectomy  She is currently not maintained on oxygen, and states that she is doing well currently overall  The following portions of the patient's history were reviewed and updated as appropriate: allergies, current medications, past family history, past medical history, past social history, past surgical history and problem list     Review of Systems   Constitutional: Negative for fever  HENT: Negative for facial swelling and trouble swallowing  Respiratory: Negative for cough and shortness of breath  Cardiovascular: Negative for chest pain, palpitations and leg swelling  Gastrointestinal: Negative for abdominal pain, constipation, diarrhea, nausea and vomiting  Genitourinary: Negative for dysuria and hematuria  Skin: Negative for pallor, rash and wound  Neurological: Negative for dizziness, light-headedness and numbness  Psychiatric/Behavioral: Negative for agitation and confusion  All other systems reviewed and are negative  Objective:      /82 (BP Location: Left arm, Patient Position: Sitting, Cuff Size: Standard)   Pulse 92   Temp 97 7 °F (36 5 °C)   Resp 20   Ht 5' 2" (1 575 m)   Wt 53 4 kg (117 lb 12 8 oz)   SpO2 96%   BMI 21 55 kg/m²          Physical Exam  Vitals reviewed  Constitutional:       General: She is not in acute distress  Appearance: She is not ill-appearing  HENT:      Nose: No congestion     Eyes:      General: No scleral icterus  Cardiovascular:      Rate and Rhythm: Normal rate and regular rhythm  Pulses: no weak pulses          Dorsalis pedis pulses are 2+ on the right side and 2+ on the left side  Heart sounds: No murmur heard  Pulmonary:      Effort: Pulmonary effort is normal  No respiratory distress  Breath sounds: No wheezing or rales  Abdominal:      Tenderness: There is no abdominal tenderness  Musculoskeletal:      Right lower leg: No edema  Left lower leg: No edema  Feet:    Feet:      Right foot:      Skin integrity: No ulcer, skin breakdown, erythema, warmth, callus or dry skin  Left foot:      Skin integrity: No ulcer, skin breakdown, erythema, warmth, callus or dry skin  Skin:     General: Skin is warm and dry  Capillary Refill: Capillary refill takes less than 2 seconds  Neurological:      General: No focal deficit present  Mental Status: She is alert  Patient's shoes and socks removed  Right Foot/Ankle   Right Foot Inspection  Skin Exam: skin normal and skin intact no dry skin, no warmth, no callus, no erythema, no maceration, no abnormal color, no pre-ulcer, no ulcer and no callus                            Sensory   Vibration: intact    Monofilament testing: intact  Vascular    The right DP pulse is 2+  Left Foot/Ankle  Left Foot Inspection  Skin Exam: skin normal and skin intactno dry skin, no warmth, no erythema, no maceration, normal color, no pre-ulcer, no ulcer and no callus                                         Sensory   Vibration: intact    Monofilament: intact  Vascular    The left DP pulse is 2+  Assign Risk Category:  No deformity present; No loss of protective sensation;  No weak pulses       Risk: 0

## 2021-09-07 NOTE — ASSESSMENT & PLAN NOTE
·  Had undergone bilateral lung transplantation in 2013;    About 4 years ago had left lung transplantation done as well  ·  currently follows with lung transplant team over at [facility] with potential plans for doing a right lung re transplant  ·   Has occasional chest wall pain,  Will trial Voltaren gel

## 2021-09-07 NOTE — ASSESSMENT & PLAN NOTE
Lab Results   Component Value Date    HGBA1C 6 6 (H) 04/01/2020     · Will check microalbumin/creatinine urine ratio,  Hemoglobin A1c and lipid panel  ·   Currently on sliding scale insulin with blood sugars ranging from low 100s to 200s-300s  · Will probably need basal-bolus insulin; will follow up A1c  ·  patient states that she will be establishing care with ophthalmology soon  ·  will follow-up in 3 months

## 2021-09-08 DIAGNOSIS — R53.83 FATIGUE, UNSPECIFIED TYPE: Primary | ICD-10-CM

## 2021-09-09 ENCOUNTER — HOSPITAL ENCOUNTER (EMERGENCY)
Facility: HOSPITAL | Age: 46
Discharge: OTHER INSTITUTION | End: 2021-09-09
Attending: EMERGENCY MEDICINE
Payer: COMMERCIAL

## 2021-09-09 ENCOUNTER — APPOINTMENT (EMERGENCY)
Dept: RADIOLOGY | Facility: HOSPITAL | Age: 46
End: 2021-09-09
Payer: COMMERCIAL

## 2021-09-09 VITALS
HEIGHT: 62 IN | OXYGEN SATURATION: 97 % | WEIGHT: 117 LBS | HEART RATE: 80 BPM | DIASTOLIC BLOOD PRESSURE: 80 MMHG | TEMPERATURE: 98.4 F | SYSTOLIC BLOOD PRESSURE: 126 MMHG | RESPIRATION RATE: 16 BRPM | BODY MASS INDEX: 21.53 KG/M2

## 2021-09-09 DIAGNOSIS — R09.02 HYPOXIA: Primary | ICD-10-CM

## 2021-09-09 DIAGNOSIS — N39.0 UTI (URINARY TRACT INFECTION): ICD-10-CM

## 2021-09-09 DIAGNOSIS — R65.20 SEVERE SEPSIS (HCC): ICD-10-CM

## 2021-09-09 DIAGNOSIS — A41.9 SEVERE SEPSIS (HCC): ICD-10-CM

## 2021-09-09 DIAGNOSIS — R07.9 CHEST PAIN: ICD-10-CM

## 2021-09-09 LAB
ALBUMIN SERPL BCP-MCNC: 3.2 G/DL (ref 3.5–5)
ALP SERPL-CCNC: 90 U/L (ref 46–116)
ALT SERPL W P-5'-P-CCNC: 58 U/L (ref 12–78)
ANION GAP SERPL CALCULATED.3IONS-SCNC: 8 MMOL/L (ref 4–13)
APTT PPP: 30 SECONDS (ref 23–37)
AST SERPL W P-5'-P-CCNC: 49 U/L (ref 5–45)
ATRIAL RATE: 105 BPM
BACTERIA UR QL AUTO: ABNORMAL /HPF
BASOPHILS # BLD AUTO: 0.04 THOUSANDS/ΜL (ref 0–0.1)
BASOPHILS NFR BLD AUTO: 0 % (ref 0–1)
BILIRUB SERPL-MCNC: 0.23 MG/DL (ref 0.2–1)
BILIRUB UR QL STRIP: NEGATIVE
BUN SERPL-MCNC: 22 MG/DL (ref 5–25)
CALCIUM ALBUM COR SERPL-MCNC: 9 MG/DL (ref 8.3–10.1)
CALCIUM SERPL-MCNC: 8.4 MG/DL (ref 8.3–10.1)
CHLORIDE SERPL-SCNC: 104 MMOL/L (ref 100–108)
CLARITY UR: CLEAR
CO2 SERPL-SCNC: 22 MMOL/L (ref 21–32)
COLOR UR: YELLOW
CREAT SERPL-MCNC: 1.16 MG/DL (ref 0.6–1.3)
EOSINOPHIL # BLD AUTO: 0.13 THOUSAND/ΜL (ref 0–0.61)
EOSINOPHIL NFR BLD AUTO: 1 % (ref 0–6)
ERYTHROCYTE [DISTWIDTH] IN BLOOD BY AUTOMATED COUNT: 13.8 % (ref 11.6–15.1)
FLUAV RNA RESP QL NAA+PROBE: NEGATIVE
FLUBV RNA RESP QL NAA+PROBE: NEGATIVE
GFR SERPL CREATININE-BSD FRML MDRD: 58 ML/MIN/1.73SQ M
GLUCOSE SERPL-MCNC: 187 MG/DL (ref 65–140)
GLUCOSE SERPL-MCNC: 248 MG/DL (ref 65–140)
GLUCOSE SERPL-MCNC: 40 MG/DL (ref 65–140)
GLUCOSE UR STRIP-MCNC: NEGATIVE MG/DL
HCT VFR BLD AUTO: 40 % (ref 34.8–46.1)
HGB BLD-MCNC: 12.3 G/DL (ref 11.5–15.4)
HGB UR QL STRIP.AUTO: ABNORMAL
IMM GRANULOCYTES # BLD AUTO: 0.11 THOUSAND/UL (ref 0–0.2)
IMM GRANULOCYTES NFR BLD AUTO: 1 % (ref 0–2)
INR PPP: 0.95 (ref 0.84–1.19)
KETONES UR STRIP-MCNC: NEGATIVE MG/DL
LACTATE SERPL-SCNC: 1.9 MMOL/L (ref 0.5–2)
LEUKOCYTE ESTERASE UR QL STRIP: ABNORMAL
LYMPHOCYTES # BLD AUTO: 2.47 THOUSANDS/ΜL (ref 0.6–4.47)
LYMPHOCYTES NFR BLD AUTO: 19 % (ref 14–44)
MCH RBC QN AUTO: 28 PG (ref 26.8–34.3)
MCHC RBC AUTO-ENTMCNC: 30.8 G/DL (ref 31.4–37.4)
MCV RBC AUTO: 91 FL (ref 82–98)
MONOCYTES # BLD AUTO: 1.12 THOUSAND/ΜL (ref 0.17–1.22)
MONOCYTES NFR BLD AUTO: 9 % (ref 4–12)
NEUTROPHILS # BLD AUTO: 9.28 THOUSANDS/ΜL (ref 1.85–7.62)
NEUTS SEG NFR BLD AUTO: 70 % (ref 43–75)
NITRITE UR QL STRIP: POSITIVE
NON-SQ EPI CELLS URNS QL MICRO: ABNORMAL /HPF
NRBC BLD AUTO-RTO: 0 /100 WBCS
P AXIS: 37 DEGREES
PH UR STRIP.AUTO: 5.5 [PH]
PLATELET # BLD AUTO: 198 THOUSANDS/UL (ref 149–390)
PMV BLD AUTO: 9.4 FL (ref 8.9–12.7)
POTASSIUM SERPL-SCNC: 3.8 MMOL/L (ref 3.5–5.3)
PR INTERVAL: 118 MS
PROCALCITONIN SERPL-MCNC: 0.09 NG/ML
PROT SERPL-MCNC: 7.9 G/DL (ref 6.4–8.2)
PROT UR STRIP-MCNC: NEGATIVE MG/DL
PROTHROMBIN TIME: 12.7 SECONDS (ref 11.6–14.5)
QRS AXIS: 61 DEGREES
QRSD INTERVAL: 72 MS
QT INTERVAL: 318 MS
QTC INTERVAL: 420 MS
RBC # BLD AUTO: 4.4 MILLION/UL (ref 3.81–5.12)
RBC #/AREA URNS AUTO: ABNORMAL /HPF
RSV RNA RESP QL NAA+PROBE: NEGATIVE
SARS-COV-2 RNA RESP QL NAA+PROBE: NEGATIVE
SODIUM SERPL-SCNC: 134 MMOL/L (ref 136–145)
SP GR UR STRIP.AUTO: 1.02 (ref 1–1.03)
T WAVE AXIS: 45 DEGREES
TROPONIN I SERPL-MCNC: <0.02 NG/ML
UROBILINOGEN UR QL STRIP.AUTO: 0.2 E.U./DL
VENTRICULAR RATE: 105 BPM
WBC # BLD AUTO: 13.15 THOUSAND/UL (ref 4.31–10.16)
WBC #/AREA URNS AUTO: ABNORMAL /HPF

## 2021-09-09 PROCEDURE — 84145 PROCALCITONIN (PCT): CPT | Performed by: PHYSICIAN ASSISTANT

## 2021-09-09 PROCEDURE — 93010 ELECTROCARDIOGRAM REPORT: CPT | Performed by: INTERNAL MEDICINE

## 2021-09-09 PROCEDURE — 99285 EMERGENCY DEPT VISIT HI MDM: CPT

## 2021-09-09 PROCEDURE — 82948 REAGENT STRIP/BLOOD GLUCOSE: CPT

## 2021-09-09 PROCEDURE — 96367 TX/PROPH/DG ADDL SEQ IV INF: CPT

## 2021-09-09 PROCEDURE — 36415 COLL VENOUS BLD VENIPUNCTURE: CPT | Performed by: PHYSICIAN ASSISTANT

## 2021-09-09 PROCEDURE — 96375 TX/PRO/DX INJ NEW DRUG ADDON: CPT

## 2021-09-09 PROCEDURE — 85610 PROTHROMBIN TIME: CPT | Performed by: PHYSICIAN ASSISTANT

## 2021-09-09 PROCEDURE — 83605 ASSAY OF LACTIC ACID: CPT | Performed by: PHYSICIAN ASSISTANT

## 2021-09-09 PROCEDURE — 99291 CRITICAL CARE FIRST HOUR: CPT | Performed by: PHYSICIAN ASSISTANT

## 2021-09-09 PROCEDURE — 96361 HYDRATE IV INFUSION ADD-ON: CPT

## 2021-09-09 PROCEDURE — 71045 X-RAY EXAM CHEST 1 VIEW: CPT

## 2021-09-09 PROCEDURE — 87040 BLOOD CULTURE FOR BACTERIA: CPT | Performed by: PHYSICIAN ASSISTANT

## 2021-09-09 PROCEDURE — 93005 ELECTROCARDIOGRAM TRACING: CPT

## 2021-09-09 PROCEDURE — 85730 THROMBOPLASTIN TIME PARTIAL: CPT | Performed by: PHYSICIAN ASSISTANT

## 2021-09-09 PROCEDURE — 84484 ASSAY OF TROPONIN QUANT: CPT | Performed by: PHYSICIAN ASSISTANT

## 2021-09-09 PROCEDURE — 96365 THER/PROPH/DIAG IV INF INIT: CPT

## 2021-09-09 PROCEDURE — 0241U HB NFCT DS VIR RESP RNA 4 TRGT: CPT | Performed by: PHYSICIAN ASSISTANT

## 2021-09-09 PROCEDURE — 96366 THER/PROPH/DIAG IV INF ADDON: CPT

## 2021-09-09 PROCEDURE — 85025 COMPLETE CBC W/AUTO DIFF WBC: CPT | Performed by: PHYSICIAN ASSISTANT

## 2021-09-09 PROCEDURE — 96376 TX/PRO/DX INJ SAME DRUG ADON: CPT

## 2021-09-09 PROCEDURE — 80053 COMPREHEN METABOLIC PANEL: CPT | Performed by: PHYSICIAN ASSISTANT

## 2021-09-09 PROCEDURE — 81001 URINALYSIS AUTO W/SCOPE: CPT | Performed by: PHYSICIAN ASSISTANT

## 2021-09-09 PROCEDURE — 87086 URINE CULTURE/COLONY COUNT: CPT | Performed by: PHYSICIAN ASSISTANT

## 2021-09-09 RX ORDER — HYDROMORPHONE HCL/PF 1 MG/ML
0.5 SYRINGE (ML) INJECTION ONCE
Status: COMPLETED | OUTPATIENT
Start: 2021-09-09 | End: 2021-09-09

## 2021-09-09 RX ORDER — LIDOCAINE 50 MG/G
1 PATCH TOPICAL ONCE
Status: DISCONTINUED | OUTPATIENT
Start: 2021-09-09 | End: 2021-09-09 | Stop reason: HOSPADM

## 2021-09-09 RX ORDER — FENTANYL CITRATE 50 UG/ML
25 INJECTION, SOLUTION INTRAMUSCULAR; INTRAVENOUS ONCE
Status: COMPLETED | OUTPATIENT
Start: 2021-09-09 | End: 2021-09-09

## 2021-09-09 RX ORDER — ACETAMINOPHEN 325 MG/1
650 TABLET ORAL ONCE
Status: COMPLETED | OUTPATIENT
Start: 2021-09-09 | End: 2021-09-09

## 2021-09-09 RX ORDER — DEXTROSE MONOHYDRATE 25 G/50ML
50 INJECTION, SOLUTION INTRAVENOUS ONCE
Status: COMPLETED | OUTPATIENT
Start: 2021-09-09 | End: 2021-09-09

## 2021-09-09 RX ORDER — LINEZOLID 2 MG/ML
600 INJECTION, SOLUTION INTRAVENOUS ONCE
Status: COMPLETED | OUTPATIENT
Start: 2021-09-09 | End: 2021-09-09

## 2021-09-09 RX ORDER — METHYLPREDNISOLONE SODIUM SUCCINATE 125 MG/2ML
60 INJECTION, POWDER, LYOPHILIZED, FOR SOLUTION INTRAMUSCULAR; INTRAVENOUS ONCE
Status: COMPLETED | OUTPATIENT
Start: 2021-09-09 | End: 2021-09-09

## 2021-09-09 RX ORDER — FENTANYL CITRATE 50 UG/ML
1 INJECTION, SOLUTION INTRAMUSCULAR; INTRAVENOUS ONCE
Status: COMPLETED | OUTPATIENT
Start: 2021-09-09 | End: 2021-09-09

## 2021-09-09 RX ADMIN — METHYLPREDNISOLONE SODIUM SUCCINATE 60 MG: 125 INJECTION, POWDER, FOR SOLUTION INTRAMUSCULAR; INTRAVENOUS at 15:05

## 2021-09-09 RX ADMIN — SODIUM CHLORIDE 1000 ML: 0.9 INJECTION, SOLUTION INTRAVENOUS at 10:46

## 2021-09-09 RX ADMIN — HYDROMORPHONE HYDROCHLORIDE 0.5 MG: 1 INJECTION, SOLUTION INTRAMUSCULAR; INTRAVENOUS; SUBCUTANEOUS at 10:42

## 2021-09-09 RX ADMIN — ACETAMINOPHEN 650 MG: 325 TABLET, FILM COATED ORAL at 11:18

## 2021-09-09 RX ADMIN — FENTANYL CITRATE 25 MCG: 50 INJECTION INTRAMUSCULAR; INTRAVENOUS at 18:25

## 2021-09-09 RX ADMIN — DEXTROSE MONOHYDRATE 50 ML: 25 INJECTION, SOLUTION INTRAVENOUS at 11:50

## 2021-09-09 RX ADMIN — CEFEPIME HYDROCHLORIDE 2000 MG: 2 INJECTION, POWDER, FOR SOLUTION INTRAVENOUS at 11:50

## 2021-09-09 RX ADMIN — LINEZOLID 600 MG: 600 INJECTION, SOLUTION INTRAVENOUS at 14:06

## 2021-09-09 RX ADMIN — NOREPINEPHRINE BITARTRATE 1 MCG/MIN: 1 INJECTION, SOLUTION, CONCENTRATE INTRAVENOUS at 15:19

## 2021-09-09 RX ADMIN — FENTANYL CITRATE 25 MCG: 50 INJECTION INTRAMUSCULAR; INTRAVENOUS at 14:01

## 2021-09-09 RX ADMIN — LIDOCAINE 5% 1 PATCH: 700 PATCH TOPICAL at 13:56

## 2021-09-09 RX ADMIN — SODIUM CHLORIDE 1000 ML: 0.9 INJECTION, SOLUTION INTRAVENOUS at 14:00

## 2021-09-09 NOTE — EMTALA/ACUTE CARE TRANSFER
[address] [place] [address]  Dept: [phone]      EMTALA TRANSFER CONSENT    NAME Chris Fortune                                         1975                              MRN [id number]    I have been informed of my rights regarding examination, treatment, and transfer   by Dr Osmin Quick: Specialized equipment and/or services available at the receiving facility (Include comment)________________________ (Transplant team)    Risks: Potential for delay in receiving treatment, Potential deterioration of medical condition, Loss of IV, Increased discomfort during transfer, Possible worsening of condition or death during transfer      Transfer Request   I acknowledge that my medical condition has been evaluated and explained to me by the emergency department physician or other qualified medical person and/or my attending physician who has recommended and offered to me further medical examination and treatment  I understand the Hospital's obligation with respect to the treatment and stabilization of my emergency medical condition  I nevertheless request to be transferred  I release the Hospital, the doctor, and any other persons caring for me from all responsibility or liability for any injury or ill effects that may result from my transfer and agree to accept all responsibility for the consequences of my choice to transfer, rather than receive stabilizing treatment at the Hospital  I understand that because the transfer is my request, my insurance may not provide reimbursement for the services  The Hospital will assist and direct me and my family in how to make arrangements for transfer, but the hospital is not liable for any fees charged by the transport service    In spite of this understanding, I refuse to consent to further medical examination and treatment which has been offered to me, and request transfer to Accepting Facility Name, [facility]  I authorize the performance of emergency medical procedures and treatments upon me in both transit and upon arrival at the receiving facility  Additionally, I authorize the release of any and all medical records to the receiving facility and request they be transported with me, if possible  I authorize the performance of emergency medical procedures and treatments upon me in both transit and upon arrival at the receiving facility  Additionally, I authorize the release of any and all medical records to the receiving facility and request they be transported with me, if possible  I understand that the safest mode of transportation during a medical emergency is an ambulance and that the Hospital advocates the use of this mode of transport  Risks of traveling to the receiving facility by car, including absence of medical control, life sustaining equipment, such as oxygen, and medical personnel has been explained to me and I fully understand them  (JUSTIN CORRECT BOX BELOW)  [ X ]  I consent to the stated transfer and to be transported by ambulance/helicopter  [  ]  I consent to the stated transfer, but refuse transportation by ambulance and accept full responsibility for my transportation by car  I understand the risks of non-ambulance transfers and I exonerate the Hospital and its staff from any deterioration in my condition that results from this refusal     X___________________________________________    DATE  21  TIME________  Signature of patient or legally responsible individual signing on patient behalf           RELATIONSHIP TO PATIENT_________________________          Provider Certification    NAME Suresh Tello                                         1975                              MRN [id number]    A medical screening exam was performed on the above named patient    Based on the examination:    Condition Necessitating Transfer The encounter diagnosis was Hypoxia  Patient Condition: The patient has been stabilized such that within reasonable medical probability, no material deterioration of the patient condition or the condition of the unborn child(joseph) is likely to result from the transfer    Reason for Transfer: Level of Care needed not available at this facility    Transfer Requirements: [address]   · Space available and qualified personnel available for treatment as acknowledged by    · Agreed to accept transfer and to provide appropriate medical treatment as acknowledged by       Dr Dex Still  · Appropriate medical records of the examination and treatment of the patient are provided at the time of transfer   [facility], [address] _______  · Transfer will be performed by qualified personnel from    and appropriate transfer equipment as required, including the use of necessary and appropriate life support measures      Provider Certification: I have examined the patient and explained the following risks and benefits of being transferred/refusing transfer to the patient/family:  General risk, such as traffic hazards, adverse weather conditions, rough terrain or turbulence, possible failure of equipment (including vehicle or aircraft), or consequences of actions of persons outside the control of the transport personnel, Unanticipated needs of medical equipment and personnel during transport, Risk of worsening condition, The possibility of a transport vehicle being unavailable      Based on these reasonable risks and benefits to the patient and/or the unborn child(joseph), and based upon the information available at the time of the patients examination, I certify that the medical benefits reasonably to be expected from the provision of appropriate medical treatments at another medical facility outweigh the increasing risks, if any, to the individuals medical condition, and in the case of labor to the unborn child, from effecting the transfer      X____________________________________________ DATE 09/09/21        TIME_______      ORIGINAL - SEND TO MEDICAL RECORDS   COPY - SEND WITH PATIENT DURING TRANSFER

## 2021-09-09 NOTE — ED ATTENDING ATTESTATION
9/9/2021  IDestiny MD, saw and evaluated the patient  I have discussed the patient with the resident/non-physician practitioner and agree with the resident's/non-physician practitioner's findings, Plan of Care, and MDM as documented in the resident's/non-physician practitioner's note, except where noted  All available labs and Radiology studies were reviewed  I was present for key portions of any procedure(s) performed by the resident/non-physician practitioner and I was immediately available to provide assistance  At this point I agree with the current assessment done in the Emergency Department  I have conducted an independent evaluation of this patient a history and physical is as follows:    Patient is a 68-year-old female who presents to the emergency department with right-sided chest pain and dyspnea  Onset last night without identified provocation  Discomfort is sharp in feel similar to that with prior lung collapse  Today she became hypoxic with minimal exertion to the 70s  Sats improved to the 90s on 6 L of supplemental O2  She reached out to her specialist at [facility] who called an ambulance for her to come in for evaluation  History of cystic fibrosis and bilateral lung transplant  She has had recurrent problems with right lung atelectasis and hypoxic respiratory failure for which she has required ventilation an ECMO  Most recently she was hospitalized from the end of June through the beginning of July  She had gradually improved following that point and has been off of supplemental O2 doing well until yesterday  She denies having noticed anything abnormal until yesterday evening  She had not had any nasal congestion, sore throat or change in cough  No nausea or vomiting  She did have multiple episodes of diarrhea last night which were nonbloody and without mucus presence  She does feel dehydrated at this time with dry mouth    She is febrile here although had not appreciated elevated temperature  No known sick contacts  Child has been in school although wearing a mask  Patient has received COVID vaccination and booster  On exam patient appears quite malaised  Mucous membranes are dry  Heart sounds regular  She is mildly tachycardic  O2 sat is 98% on 3 L of supplemental O2 at rest   Lungs are diffusely rhonchorous (which she notes is in contrast to usual clear sounding lungs)  She does have some bilateral CVA tenderness  The abdomen is soft and nontender  Calves are nontender non edematous with strong PT pulses  Chest x-ray reveals significant right-sided atelectasis and hyperinflation of the left lung similar to that in June  I do not see distinct infiltrates on this  She is febrile in concern is present for viral versus bacterial infection  Viral respiratory swab pending  At this point vince and Jenelle is reaching out to her specialists at [facility] regarding empiric antibiotic regimen among other treatment recommendations  ED Course  ED Course as of Sep 09 2236   Thu Sep 09, 6918   4263 Systolic pressure now dropping to the upper/mid 80s  She has received 1 L saline thus far   2 L has been ordered  Pressures lui to 100s & then dipped again to 90s  Sita Jesus stayed in close contact w/ pt  Specialty team   Given prior encounter w/ rapid decline recommendation was to start pressor early  W/ SBP 95 initiated levophed 1mcg/min w/ ability to titrate prn  She was accepted to higher level of care bed & plan for transfer expedited  Fortunately w/ time pressures lui & levophed was held  No increased O2 requirements      Critical Care Time  Procedures

## 2021-09-09 NOTE — ED NOTES
Leroy Cerrato called and informed that patient is on their way   Report called earlier today by SUSIE Doty RN  09/09/21 1941

## 2021-09-09 NOTE — ED PROVIDER NOTES
History  Chief Complaint   Patient presents with    Shortness of Breath     patient reports right sided rib/lung pain  hx collasped lung  Patient denies NV, reports diarrhea last night     Patient is 46-year-old female history of CF who is status post double lung transplant who presents to the emergency department for evaluation of right-sided chest wall pain and difficulty breathing  The patient states that the symptoms started last night  She states the symptoms worsened today, and she feels like her right lung is collapsing again  She states she has a previous history of this, and it resulted in a 1 month hospital stay  She states that her transplant treatment team is down at [facility] (Dr Garry Cano), and she ultimately will require transfer there for care  She states that she called them this morning with what was going on, and the ultimately recommended that she come to the emergency department immediately  The patient states that she does have a pulse ox at home, and she was saturating in the low 90s at rest   She reports that she then went to put her dogs away in their pen, and she dropped down into the 70s  She reports a chronic cough, but she denies any worsening cough  She additionally denies any fever or known sick contacts  Her only other reported symptom is diarrhea that started last night  She denies any abdominal pain, nausea or vomiting  She does report that she feels very dehydrated at this time  She additionally denies ear pain, throat pain, dysuria, rash, headache or dizziness  History provided by:  Patient   used: No        Prior to Admission Medications   Prescriptions Last Dose Informant Patient Reported? Taking?    ALPRAZolam (XANAX) 1 mg tablet   No No   Sig: TAKE 1 TABLET BY MOUTH TWICE A DAY   Cannabinoids (THC FREE) 20 MG/ML LIQD  Self Yes No   Sig: Take by mouth   Diclofenac Sodium (VOLTAREN) 1 %   No No   Sig: Apply 2 g topically 4 (four) times a day HYDROcodone-acetaminophen (NORCO) 5-325 mg per tablet   No No   Sig: Take 1 tablet by mouth every 6 (six) hours as needed for painMax Daily Amount: 4 tablets   Patient not taking: Reported on 9/7/2021   Multiple Vitamin (MULTIVITAMIN) tablet  Self Yes No   Sig: Take 1 tablet by mouth daily   NON FORMULARY  Self Yes No   Sig: Discontinue supplemental oxygen   Respiratory Therapy Supplies (Nebulizer) SUKHI   Yes No   Sig: Please dispense nebulizer machine   SYRINGE-NEEDLE, DISP, 3 ML 23G X 1" 3 ML MISC   Yes No   Sig: Use twice daily to draw up acetylcysteine   Sennosides 17 2 MG TABS   Yes No   Sig: Take 17 2 mg by mouth 2 (two) times a day   acetylcysteine (MUCOMYST) 200 mg/mL nebulizer solution   Yes No   Sig: Mix with 2 mL sodium chloride 0 3% nebs & administer twice daily with albuterol via nebulizer   albuterol (5 mg/mL) 0 5 % nebulizer solution   Yes No   Sig: Inhale 2 5 mg 2 (two) times a day   albuterol (ACCUNEB) 1 25 MG/3ML nebulizer solution  Self Yes No   Sig: Take 1 ampule by nebulization every 4 (four) hours as needed for wheezing   alendronate (FOSAMAX) 70 mg tablet  Self Yes No   Sig: Take 70 mg by mouth every 7 days   alendronate (FOSAMAX) 70 mg tablet   Yes No   Sig: Take 70 mg by mouth every 7 days   apixaban (ELIQUIS) 5 mg  Self Yes No   Sig: Take 5 mg by mouth 2 (two) times a day 5 mg Bid   atovaquone (MEPRON) 750 mg/5 mL suspension  Self Yes No   Sig: Take 1,500 mg by mouth daily   colistimethate (COLY-MYCIN M) inhalation solution  Self Yes No   Sig: Take 150 mg by nebulization every 12 (twelve) hours   Patient not taking: Reported on 9/7/2021   dexlansoprazole (DEXILANT) 60 MG capsule  Self Yes No   Sig: Take 60 mg by mouth daily   dexlansoprazole (DEXILANT) 60 MG capsule   Yes No   Sig: Take 60 mg by mouth   dronabinol (MARINOL) 5 MG capsule   No No   Sig: TAKE 1 CAPSULE (5 MG TOTAL) BY MOUTH 3 (THREE) TIMES A DAY   escitalopram (LEXAPRO) 10 mg tablet  Self Yes No   Sig: Take 10 mg by mouth daily   Patient not taking: Reported on 2021   esomeprazole (NexIUM) 40 MG capsule   Yes No   Sig: Take 40 mg by mouth daily   famotidine (PEPCID) 20 mg tablet   Yes No   Sig: Take 20 mg by mouth 2 (two) times a day   ferrous sulfate 325 (65 Fe) mg tablet  Self Yes No   Sig: Take 325 mg by mouth daily with breakfast   insulin aspart (NovoLOG) 100 units/mL injection  Self Yes No   Sig: Inject under the skin 3 (three) times a day before meals   insulin aspart, w/niacinamide, (FIASP) 100 Units/mL injection pen   Yes No   Sig: Inject 4 unites with breakfast, 5 unites with lunch & dinner   LOW SLIDING SCALE correction with meals & bedtime   magnesium oxide (MAG-OX) 400 mg  Self Yes No   Sig: Take 400 mg by mouth 2 (two) times a day   naloxone (NARCAN) 4 mg/0 1 mL nasal spray   Yes No   Si mg into each nostril   ondansetron (ZOFRAN-ODT) 4 mg disintegrating tablet  Self No No   Sig: Take 1 tablet (4 mg total) by mouth every 6 (six) hours as needed for nausea or vomiting   oxyCODONE (ROXICODONE) 10 MG TABS   Yes No   Sig: Take 10 mg by mouth   pancrelipase, Lip-Prot-Amyl, (CREON) 24,000 units  Self Yes No   Sig: Take 48,000 Units by mouth 3 (three) times a day with meals   pancrelipase, Lip-Prot-Amyl, (CREON) 24,000 units   Yes No   Sig: Take 2 capsules by mouth   polyethylene glycol (GLYCOLAX) 17 GM/SCOOP powder   Yes No   Sig: Take 17 g by mouth daily   polyethylene glycol (MIRALAX) 17 g packet  Self Yes No   Sig: Take 17 g by mouth 2 (two) times a day as needed   predniSONE 10 mg tablet   Yes No   Sig: Take 10 mg by mouth daily   predniSONE 20 mg tablet   No No   Sig: TAKE 1 TAB 3X/DAY X 3 DAYS, 1 TAB TWICE DAILY X 3 DAYS, 1 TAB DAILY X 3 DAYS   pregabalin (LYRICA) 25 mg capsule   Yes No   Sig: Take 25 mg by mouth Three times a day   simethicone (MYLICON) 80 mg chewable tablet   Yes No   Sig: Chew 80 mg   Patient not taking: Reported on 2021   sodium chloride 3 % inhalation solution   Yes No   Sig: Inhale 4 mL 2 (two) times a day   tacrolimus (PROGRAF) 1 mg capsule   Yes No   Sig: Take 3 mg by mouth 2 (two) times a day   tacrolimus (PROGRAF) 5 mg capsule  Self Yes No   Sig: Take 2 5 mg by mouth daily with breakfast   Patient not taking: Reported on 9/7/2021   tacrolimus (PROGRAF) 5 mg capsule  Self Yes No   Sig: Take 3 mg by mouth daily with dinner   Patient not taking: Reported on 9/7/2021   traZODone (DESYREL) 50 mg tablet  Self Yes No   Sig: Take 25 mg by mouth   valACYclovir (VALTREX) 500 mg tablet   Yes No   Sig: Take 500 mg by mouth daily      Facility-Administered Medications: None       Past Medical History:   Diagnosis Date    ABPA (allergic bronchopulmonary aspergillosis) ([facility] [address] ) 7/8/2012    Acute kidney injury ([facility] [address] ) 9/8/2017    Acute on chronic respiratory failure ([facility] [address] ) 3/4/2017    Last Assessment & Plan:  Formatting of this note might be different from the original  Recurrent with clinical worsening with enlarging PTX, s/p multiple chest tubes, but new fevers 6/19 and worsening leukocytosis indicating infection as cause of 6/19 decompensation with profound hypoxia in setting of shunting through right lung and continued pneumothorax s/p 2nd chest tube placement  6/27: Right    Asthma     Chronic pain     Cystic fibrosis ([facility] [address] [facility]     Diabetes mellitus ([facility] [address] )     GERD (gastroesophageal reflux disease)     HBP (high blood pressure)     HCAP (healthcare-associated pneumonia) 3/6/2020    Influenza B 2/23/2020    Kidney stone     Neutropenic fever ([facility] [address] ) 9/26/2020    PONV (postoperative nausea and vomiting)     Pseudomonas aeruginosa infection 9/20/2012    Transplant recipient     Lung- 2013, 2017       Past Surgical History:   Procedure Laterality Date    APPENDECTOMY      BREAST SURGERY      Aug      BRONCHOSCOPY      CHOLECYSTECTOMY      COLONOSCOPY      EGD  07/2019    Yeast Infect       FL RETROGRADE PYELOGRAM  1/9/2020    FL RETROGRADE PYELOGRAM  2/4/2020    GASTROSTOMY TUBE, PLACE      LUNG SURGERY      LUNG TRANSPLANT      X2    UT CYSTO/URETERO W/LITHOTRIPSY &INDWELL STENT INSRT Right 2/4/2020    Procedure: CYSTOSCOPY URETEROSCOPY WITH LITHOTRIPSY HOLMIUM LASER, RETROGRADE PYELOGRAM AND INSERTION STENT URETERAL;  Surgeon: Laura Albarran MD;  Location: [facility];  Service: Urology    UT CYSTOURETHROSCOPY,URETER CATHETER Right 1/9/2020    Procedure: CYSTOSCOPY RETROGRADE PYELOGRAM WITH INSERTION STENT URETERAL;  Surgeon: Laura Albarran MD;  Location: [facility];  Service: Urology       Family History   Problem Relation Age of Onset    Alcohol abuse Mother     Mental illness Mother     Alcohol abuse Father     COPD Maternal Grandmother     COPD Maternal Grandfather     COPD Paternal Grandmother     Breast cancer Neg Hx     Ovarian cancer Neg Hx     Colon cancer Neg Hx      I have reviewed and agree with the history as documented  E-Cigarette/Vaping    E-Cigarette Use Never User      E-Cigarette/Vaping Substances     Social History     Tobacco Use    Smoking status: Former Smoker     Packs/day: 0 50     Years: 12 00     Pack years: 6 00    Smokeless tobacco: Never Used   Vaping Use    Vaping Use: Never used   Substance Use Topics    Alcohol use: Yes     Comment: social    Drug use: Yes     Frequency: 2 0 times per week     Types: Marijuana     Comment: medical edible marijuana prescribed       Review of Systems   Constitutional: Negative for chills and fever  HENT: Negative for ear pain and sore throat  Eyes: Negative for redness and visual disturbance  Respiratory: Positive for cough (Chronic) and shortness of breath  Negative for chest tightness  Cardiovascular: Positive for chest pain  Gastrointestinal: Positive for diarrhea  Negative for abdominal pain, nausea and vomiting  Genitourinary: Negative for dysuria and hematuria  Musculoskeletal: Negative for back pain, neck pain and neck stiffness  Skin: Negative for color change and rash  Neurological: Negative for dizziness, light-headedness and headaches  All other systems reviewed and are negative  Physical Exam  Physical Exam  Vitals and nursing note reviewed  Constitutional:       General: She is not in acute distress  Appearance: She is well-developed  She is not ill-appearing or toxic-appearing  HENT:      Head: Normocephalic and atraumatic  Mouth/Throat:      Pharynx: Uvula midline  Eyes:      General: Lids are normal       Conjunctiva/sclera: Conjunctivae normal    Cardiovascular:      Rate and Rhythm: Regular rhythm  Tachycardia present  Pulses: Normal pulses  Heart sounds: Normal heart sounds  Pulmonary:      Effort: Accessory muscle usage present  No respiratory distress  Breath sounds: Decreased air movement present  Rhonchi present  Abdominal:      General: There is no distension  Palpations: Abdomen is soft  Tenderness: There is no abdominal tenderness  Musculoskeletal:      Cervical back: Normal range of motion and neck supple  Right lower leg: No edema  Left lower leg: No edema  Skin:     General: Skin is warm and dry  Neurological:      Mental Status: She is alert and oriented to person, place, and time           Vital Signs  ED Triage Vitals [09/09/21 1039]   Temperature Pulse Respirations Blood Pressure SpO2   (!) 101 2 °F (38 4 °C) 102 (!) 24 151/95 97 %      Temp Source Heart Rate Source Patient Position - Orthostatic VS BP Location FiO2 (%)   Oral Monitor Lying Right arm --      Pain Score       7           Vitals:    09/09/21 1830 09/09/21 1900 09/09/21 1915 09/09/21 1930   BP: 114/67 115/69 119/66 126/80   Pulse: 80 74 74 80   Patient Position - Orthostatic VS:             Visual Acuity  Visual Acuity      Most Recent Value   L Pupil Size (mm)  4   R Pupil Size (mm)  4          ED Medications  Medications   fentanyl citrate (PF) (FOR EMS ONLY) 100 mcg/2 mL injection 100 mcg (0 mcg Does not apply Given to EMS 9/9/21 1335)   HYDROmorphone (DILAUDID) injection 0 5 mg (0 5 mg Intravenous Given 9/9/21 1042)   sodium chloride 0 9 % bolus 1,000 mL (0 mL Intravenous Stopped 9/9/21 1400)   acetaminophen (TYLENOL) tablet 650 mg (650 mg Oral Given 9/9/21 1118)   linezolid (ZYVOX) IVPB (premix in dextrose) 600 mg 300 mL (0 mg Intravenous Stopped 9/9/21 1510)   cefepime (MAXIPIME) 2 g/50 mL dextrose IVPB (0 mg Intravenous Stopped 9/9/21 1220)   dextrose 50 % IV solution 50 mL (50 mL Intravenous Given 9/9/21 1150)   fentanyl citrate (PF) 100 MCG/2ML 25 mcg (25 mcg Intravenous Given 9/9/21 1401)   sodium chloride 0 9 % bolus 1,000 mL (0 mL Intravenous Stopped 9/9/21 1500)   methylPREDNISolone sodium succinate (Solu-MEDROL) injection 60 mg (60 mg Intravenous Given 9/9/21 1505)   fentanyl citrate (PF) 100 MCG/2ML 25 mcg (25 mcg Intravenous Given 9/9/21 1825)       Diagnostic Studies  Results Reviewed     Procedure Component Value Units Date/Time    Urine culture [[id number]] Collected: 09/09/21 1414    Lab Status: Preliminary result Specimen: Urine, Other Updated: 09/10/21 1654     Urine Culture Culture too young- will reincubate    Blood culture #1 [[id number]] Collected: 09/09/21 1129    Lab Status: Preliminary result Specimen: Blood from Arm, Right Updated: 09/10/21 1601     Blood Culture No Growth at 24 hrs  Blood culture #2 [[id number]] Collected: 09/09/21 1059    Lab Status: Preliminary result Specimen: Blood from Arm, Left Updated: 09/10/21 1601     Blood Culture No Growth at 24 hrs      Fingerstick Glucose (POCT) [[id number]]  (Abnormal) Collected: 09/09/21 1927    Lab Status: Final result Updated: 09/09/21 1928     POC Glucose 248 mg/dl     Urine Microscopic [[id number]]  (Abnormal) Collected: 09/09/21 1414    Lab Status: Final result Specimen: Urine, Other Updated: 09/09/21 1601     RBC, UA 30-50 /hpf      WBC, UA 10-20 /hpf      Epithelial Cells Occasional /hpf      Bacteria, UA Moderate /hpf     UA w Reflex to Microscopic w Reflex to Culture [[id number]]  (Abnormal) Collected: 09/09/21 1414    Lab Status: Final result Specimen: Urine, Other Updated: 09/09/21 1506     Color, UA Yellow     Clarity, UA Clear     Specific Gravity, UA 1 025     pH, UA 5 5     Leukocytes, UA Trace     Nitrite, UA Positive     Protein, UA Negative mg/dl      Glucose, UA Negative mg/dl      Ketones, UA Negative mg/dl      Urobilinogen, UA 0 2 E U /dl      Bilirubin, UA Negative     Blood, UA Large    Procalcitonin with AM Reflex [[id number]]  (Normal) Collected: 09/09/21 1059    Lab Status: Final result Specimen: Blood from Arm, Left Updated: 09/09/21 1437     Procalcitonin 0 09 ng/ml     Fingerstick Glucose (POCT) [[id number]]  (Abnormal) Collected: 09/09/21 1233    Lab Status: Final result Updated: 09/09/21 1235     POC Glucose 187 mg/dl     COVID19, Influenza A/B, RSV PCR, [facility] [[id number]]  (Normal) Collected: 09/09/21 1059    Lab Status: Final result Specimen: Nares from Nose Updated: 09/09/21 1155     SARS-CoV-2 Negative     INFLUENZA A PCR Negative     INFLUENZA B PCR Negative     RSV PCR Negative    Narrative: This test has been authorized by FDA under an EUA (Emergency Use Assay) for use by authorized laboratories  Clinical caution and judgement should be used with the interpretation of these results with consideration of the clinical impression and other laboratory testing  Testing reported as "Positive" or "Negative" has been proven to be accurate according to standard laboratory validation requirements  All testing is performed with control materials showing appropriate reactivity at standard intervals  Lactic acid [[id number]]  (Normal) Collected: 09/09/21 1059    Lab Status: Final result Specimen: Blood from Arm, Left Updated: 09/09/21 1132     LACTIC ACID 1 9 mmol/L     Narrative:      Result may be elevated if tourniquet was used during collection      Troponin I [[id number]]  (Normal) Collected: 09/09/21 1059    Lab Status: Final result Specimen: Blood from Arm, Left Updated: 09/09/21 1131     Troponin I <0 02 ng/mL     Comprehensive metabolic panel [[id number]]  (Abnormal) Collected: 09/09/21 1059    Lab Status: Final result Specimen: Blood from Arm, Left Updated: 09/09/21 1129     Sodium 134 mmol/L      Potassium 3 8 mmol/L      Chloride 104 mmol/L      CO2 22 mmol/L      ANION GAP 8 mmol/L      BUN 22 mg/dL      Creatinine 1 16 mg/dL      Glucose 40 mg/dL      Calcium 8 4 mg/dL      Corrected Calcium 9 0 mg/dL      AST 49 U/L      ALT 58 U/L      Alkaline Phosphatase 90 U/L      Total Protein 7 9 g/dL      Albumin 3 2 g/dL      Total Bilirubin 0 23 mg/dL      eGFR 58 ml/min/1 73sq m     Narrative:      [place] guidelines for Chronic Kidney Disease (CKD):     Stage 1 with normal or high GFR (GFR > 90 mL/min/1 73 square meters)    Stage 2 Mild CKD (GFR = 60-89 mL/min/1 73 square meters)    Stage 3A Moderate CKD (GFR = 45-59 mL/min/1 73 square meters)    Stage 3B Moderate CKD (GFR = 30-44 mL/min/1 73 square meters)    Stage 4 Severe CKD (GFR = 15-29 mL/min/1 73 square meters)    Stage 5 End Stage CKD (GFR <15 mL/min/1 73 square meters)  Note: GFR calculation is accurate only with a steady state creatinine    Protime-INR [[id number]]  (Normal) Collected: 09/09/21 1059    Lab Status: Final result Specimen: Blood from Arm, Left Updated: 09/09/21 1126     Protime 12 7 seconds      INR 0 95    APTT [[id number]]  (Normal) Collected: 09/09/21 1059    Lab Status: Final result Specimen: Blood from Arm, Left Updated: 09/09/21 1126     PTT 30 seconds     CBC and differential [[id number]]  (Abnormal) Collected: 09/09/21 1059    Lab Status: Final result Specimen: Blood from Arm, Left Updated: 09/09/21 1107     WBC 13 15 Thousand/uL      RBC 4 40 Million/uL      Hemoglobin 12 3 g/dL      Hematocrit 40 0 %      MCV 91 fL      MCH 28 0 pg      MCHC 30 8 g/dL      RDW 13 8 %      MPV 9 4 fL      Platelets 292 Thousands/uL      nRBC 0 /100 WBCs      Neutrophils Relative 70 %      Immat GRANS % 1 %      Lymphocytes Relative 19 %      Monocytes Relative 9 %      Eosinophils Relative 1 %      Basophils Relative 0 %      Neutrophils Absolute 9 28 Thousands/µL      Immature Grans Absolute 0 11 Thousand/uL      Lymphocytes Absolute 2 47 Thousands/µL      Monocytes Absolute 1 12 Thousand/µL      Eosinophils Absolute 0 13 Thousand/µL      Basophils Absolute 0 04 Thousands/µL                  XR chest 1 view portable   Final Result by Mame Carr MD (09/09 1514)      No acute cardiopulmonary disease  Workstation performed: [id number]                    Procedures  ECG 12 Lead Documentation Only    Date/Time: 9/9/2021 6:13 PM  Performed by: Susie Boswell PA-C  Authorized by: Jenelle Zamorano PA-C     Comments:      Sinus tachycardia at 105  Normal axis  No acute ST-T changes  No significant change noted from previous  CriticalCare Time  Performed by: Susie Boswell PA-C  Authorized by:  Susie Boswell PA-C     Critical care provider statement:     Critical care time (minutes):  60    Critical care start time:  9/9/2021 12:00 PM    Critical care end time:  9/9/2021 1:00 PM    Critical care was necessary to treat or prevent imminent or life-threatening deterioration of the following conditions:  Sepsis    Critical care was time spent personally by me on the following activities:  Obtaining history from patient or surrogate, discussions with consultants, evaluation of patient's response to treatment, examination of patient, interpretation of cardiac output measurements, ordering and performing treatments and interventions, ordering and review of laboratory studies, ordering and review of radiographic studies, re-evaluation of patient's condition, development of treatment plan with patient or surrogate, blood draw for specimens and review of old charts    I assumed direction of critical care for this patient from another provider in my specialty: no               ED Course  ED Course as of Sep 11 1134   Thu Sep 09, 2021   1054 Attempted to reach patient's transplant care team at [facility]  I left a message with an answering service requesting a returned call  46 Spoke with someone from patient's  [facility] Care Team   They are requesting that the patient be started on linezolid and cefepime this time  They request a call back once the patient's COVID testing is resulted  Antibiotics ordered  Will update patient  1115 WBC(!): 13 15   1147 Glucose, Random(!): 40   1157 Patient given an amp of D50       1158 I spoke with Spencer Solis from the patient's transplant center  I made her aware of the negative COVID-19 results  She states that they would like to have her transferred to [facility], however they do not currently have any beds  She states that they are working on it, and she will keep me updated  1241 Sugar is now 187  Patient reports ongoing significant pain  However, her blood pressure has also somewhat dropped, and she is borderline hypotensive  I do not want to use more Dilaudid at this time  I put in an order for fentanyl, however I got a warning regarding interaction with linezolid  I did call and discussed this with the pharmacist, the overall said use a low dose, try to only use 1-2 doses and to monitor for serotonin syndrome  1254 Blood pressure is now significantly improving       1358 I did call [facility] and let them know that the patient's blood pressure is starting to drop  They advised that this time they will likely change the request to an ICU bed  They advised they will get back to me regarding this  [address] fluids is running  Patient is remaining persistently borderline hypotensive  I did update Tavia Aileen regarding this  They are switching her to an ICU bed  I did call PACS to let them know    [facility] is also requesting that patient be given 60 mg of Solu-Medrol, which I ordered  1442 Levophed drip ordered at this time due to patient's consistent borderline hypotension  Patient is to be transported to [facility] via either ground or air  Waiting for PACS for final decision  1517 Nitrite, UA(!): Positive   1546 Patient to be picked up in approximately 20 minutes  1618 The original aircraft that was supposed to pick the patient up had an issue  They are now currently looking for another aircraft to come get patient  [id number] Patient is currently resting comfortably with improved blood pressure  Patient remains 1 mcg drip per hour  [id number] Patient's blood pressure has stabilized, and the Levophed drip was stopped for now  She continues to rest comfortably at this time  Your still pending transportation as there have been issues due to weather delays and mechanical delays  MDM  Number of Diagnoses or Management Options  Chest pain: new and requires workup  Hypoxia: new and requires workup  Severe sepsis [facility]): new and requires workup  UTI (urinary tract infection): new and requires workup  Diagnosis management comments: Patient presents for evaluation of right-sided chest wall pain and shortness of breath that started last night  Patient has history of CF and is a double lung transplant recipient  Patient reports that the pain feels similar to when her right lung previously collapsed  Patient was found to be febrile in the ED  Differential includes but is not limited to pneumothorax versus pneumonia versus UTI versus bronchiectasis versus COVID-19  Labs, imaging and ECG were ordered  Meds and fluids ordered  Labs reviewed  Notable for slightly elevated white blood cell count  Additionally, the patient had a blood sugar level of 40  She was given an amp of dextrose as well as juice and snacks  Her blood sugar level came up to the 180s  UA is concerning for urinary tract infection at this time    Remaining labs were largely unremarkable  Patient is negative for COVID-19  I did call and speak with the patient's care team at [facility]  They are recommending cefepime and linezolid based on patient's previous infections  Chest x-ray was reviewed  While abnormal, it does not appear significantly unchanged from prior  The decision was ultimately made by the patient's transplant team that she should be transferred to [facility] for further evaluation and care  The patient is agreeable to this  Of note, the patient's blood pressure did begin dropping into the 25W/JCA 73G systolic despite fluid resuscitation  The decision was ultimately made to start the patient on a Levophed drip at 1 mcg  Blood pressure did improve  The patient was re-evaluated multiple times during ED stay  Please see ED course for more details  Patient is stable for transfer         Amount and/or Complexity of Data Reviewed  Clinical lab tests: ordered and reviewed  Tests in the radiology section of CPT®: ordered and reviewed  Decide to obtain previous medical records or to obtain history from someone other than the patient: yes  Review and summarize past medical records: yes    Risk of Complications, Morbidity, and/or Mortality  Presenting problems: high  Diagnostic procedures: high  Management options: high    Patient Progress  Patient progress: stable      Disposition  Final diagnoses:   Hypoxia   Severe sepsis ([address] )   UTI (urinary tract infection)   Chest pain     Time reflects when diagnosis was documented in both MDM as applicable and the Disposition within this note     Time User Action Codes Description Comment    9/9/2021  2:20 PM Caroline Awe Add [R09 02] Hypoxia     9/9/2021  6:48 PM Jenelle Bueno Add [A41 9,  R65 20] Severe sepsis ([address] )     9/9/2021  6:48 PM Jenelle Bueno Add [N39 0] UTI (urinary tract infection)     9/9/2021  6:48 PM Caroline Awe Add [R07 9] Chest pain       ED Disposition     ED Disposition Condition Date/Time Comment    Transfer to Another [facility] Sep 9, 2021  2:20 PM Graham Davila should be transferred out to [address] [place] [address]          MD Documentation      Most Recent Value   Patient Condition  The patient has been stabilized such that within reasonable medical probability, no material deterioration of the patient condition or the condition of the unborn child(joseph) is likely to result from the transfer   Reason for Transfer  Level of Care needed not available at this facility   Benefits of Transfer  Specialized equipment and/or services available at the receiving facility (Include comment)________________________ [facility] team]   Risks of Transfer  Potential for delay in receiving treatment, Potential deterioration of medical condition, Loss of IV, Increased discomfort during transfer, Possible worsening of condition or death during transfer   Accepting Physician  Dr Bishnu English Name, [address]   Sending MD Morrell Libman, PA-C, Dr Yulia To   Provider Certification  General risk, such as traffic hazards, adverse weather conditions, rough terrain or turbulence, possible failure of equipment (including vehicle or aircraft), or consequences of actions of persons outside the control of the transport personnel, Unanticipated needs of medical equipment and personnel during transport, Risk of worsening condition, The possibility of a transport vehicle being unavailable      RN Documentation      Most [address] [facility] [address] Name, [address] [facility]    None         Discharge Medication List as of 9/9/2021  7:42 PM      CONTINUE these medications which have NOT CHANGED    Details   acetylcysteine (MUCOMYST) 200 mg/mL nebulizer solution Mix with 2 mL sodium chloride 0 3% nebs & administer twice daily with albuterol via nebulizer, Historical Med      albuterol (5 mg/mL) 0 5 % nebulizer solution Inhale 2 5 mg 2 (two) times a day, Historical Med      albuterol (ACCUNEB) 1 25 MG/3ML nebulizer solution Take 1 ampule by nebulization every 4 (four) hours as needed for wheezing, Historical Med      !! alendronate (FOSAMAX) 70 mg tablet Take 70 mg by mouth every 7 days, Historical Med      !! alendronate (FOSAMAX) 70 mg tablet Take 70 mg by mouth every 7 days, Starting Tue 5/25/2021, Historical Med      ALPRAZolam (XANAX) 1 mg tablet TAKE 1 TABLET BY MOUTH TWICE A DAY, Normal      apixaban (ELIQUIS) 5 mg Take 5 mg by mouth 2 (two) times a day 5 mg Bid, Historical Med      atovaquone (MEPRON) 750 mg/5 mL suspension Take 1,500 mg by mouth daily, Historical Med      Cannabinoids (THC FREE) 20 MG/ML LIQD Take by mouth, Historical Med      colistimethate (COLY-MYCIN M) inhalation solution Take 150 mg by nebulization every 12 (twelve) hours, Historical Med      !! dexlansoprazole (DEXILANT) 60 MG capsule Take 60 mg by mouth daily, Historical Med      !! dexlansoprazole (DEXILANT) 60 MG capsule Take 60 mg by mouth, Starting Fri 7/30/2021, Historical Med      Diclofenac Sodium (VOLTAREN) 1 % Apply 2 g topically 4 (four) times a day, Starting Tue 9/7/2021, Normal      dronabinol (MARINOL) 5 MG capsule TAKE 1 CAPSULE (5 MG TOTAL) BY MOUTH 3 (THREE) TIMES A DAY, Starting Thu 8/19/2021, Normal      escitalopram (LEXAPRO) 10 mg tablet Take 10 mg by mouth daily, Starting Fri 11/27/2020, Historical Med      esomeprazole (NexIUM) 40 MG capsule Take 40 mg by mouth daily, Starting Fri 8/6/2021, Historical Med      famotidine (PEPCID) 20 mg tablet Take 20 mg by mouth 2 (two) times a day, Historical Med      ferrous sulfate 325 (65 Fe) mg tablet Take 325 mg by mouth daily with breakfast, Historical Med      HYDROcodone-acetaminophen (NORCO) 5-325 mg per tablet Take 1 tablet by mouth every 6 (six) hours as needed for painMax Daily Amount: 4 tablets, Starting Thu 6/10/2021, Normal      insulin aspart (NovoLOG) 100 units/mL injection Inject under the skin 3 (three) times a day before meals, Historical Med      insulin aspart, w/niacinamide, (FIASP) 100 Units/mL injection pen Inject 4 unites with breakfast, 5 unites with lunch & dinner   LOW SLIDING SCALE correction with meals & bedtime, Historical Med      magnesium oxide (MAG-OX) 400 mg Take 400 mg by mouth 2 (two) times a day, Historical Med      Multiple Vitamin (MULTIVITAMIN) tablet Take 1 tablet by mouth daily, Historical Med      naloxone (NARCAN) 4 mg/0 1 mL nasal spray 4 mg into each nostril, Starting Fri 7/2/2021, Historical Med      NON FORMULARY Discontinue supplemental oxygen, Starting Thu 4/1/2021, Historical Med      ondansetron (ZOFRAN-ODT) 4 mg disintegrating tablet Take 1 tablet (4 mg total) by mouth every 6 (six) hours as needed for nausea or vomiting, Starting Tue 2/23/2021, Print      oxyCODONE (ROXICODONE) 10 MG TABS Take 10 mg by mouth, Starting Thu 9/2/2021, Historical Med      !! pancrelipase, Lip-Prot-Amyl, (CREON) 24,000 units Take 48,000 Units by mouth 3 (three) times a day with meals, Historical Med      !! pancrelipase, Lip-Prot-Amyl, (CREON) 24,000 units Take 2 capsules by mouth, Starting Wed 7/7/2021, Historical Med      polyethylene glycol (GLYCOLAX) 17 GM/SCOOP powder Take 17 g by mouth daily, Starting Thu 7/1/2021, Historical Med      polyethylene glycol (MIRALAX) 17 g packet Take 17 g by mouth 2 (two) times a day as needed, Historical Med      !! predniSONE 10 mg tablet Take 10 mg by mouth daily, Starting Tue 4/27/2021, Historical Med      !! predniSONE 20 mg tablet TAKE 1 TAB 3X/DAY X 3 DAYS, 1 TAB TWICE DAILY X 3 DAYS, 1 TAB DAILY X 3 DAYS, Normal      pregabalin (LYRICA) 25 mg capsule Take 25 mg by mouth Three times a day, Starting Thu 9/2/2021, Historical Med      Respiratory Therapy Supplies (Nebulizer) SUKHI Please dispense nebulizer machine, Historical Med      Sennosides 17 2 MG TABS Take 17 2 mg by mouth 2 (two) times a day, Starting Thu 7/1/2021, Historical Med      simethicone (MYLICON) 80 mg chewable tablet Chew 80 mg, Starting Thu 7/1/2021, Historical Med      sodium chloride 3 % inhalation solution Inhale 4 mL 2 (two) times a day, Starting Thu 7/1/2021, Historical Med      SYRINGE-NEEDLE, DISP, 3 ML 23G X 1" 3 ML MISC Use twice daily to draw up acetylcysteine, Historical Med      !! tacrolimus (PROGRAF) 1 mg capsule Take 3 mg by mouth 2 (two) times a day, Starting Thu 7/1/2021, Historical Med      !! tacrolimus (PROGRAF) 5 mg capsule Take 2 5 mg by mouth daily with breakfast, Historical Med      !! tacrolimus (PROGRAF) 5 mg capsule Take 3 mg by mouth daily with dinner, Historical Med      traZODone (DESYREL) 50 mg tablet Take 25 mg by mouth, Starting Mon 10/19/2020, Historical Med      valACYclovir (VALTREX) 500 mg tablet Take 500 mg by mouth daily, Starting Tue 6/1/2021, Historical Med       !! - Potential duplicate medications found  Please discuss with provider  No discharge procedures on file      [address] Review     None          ED Provider  Electronically Signed by           Sayda Ferguson PA-C  09/09/21 Gisela Butler PA-C  09/11/21 [id number]

## 2021-09-09 NOTE — ED NOTES
No flight at this time  Weather pending     [facility] on ground      Aggie Shah RN  09/09/21 [id number]

## 2021-09-09 NOTE — ED NOTES
PATIENTS BP STABLE AT THIS TIME   PROVIDER NOTIFIED REGARDING STARTING THE NOREPI AT THIS TIME PRIOR TO TRANSFER     Leonardo Diaz RN  09/09/21 [id number]

## 2021-09-11 LAB — BACTERIA UR CULT: NORMAL

## 2021-09-14 LAB
BACTERIA BLD CULT: NORMAL
BACTERIA BLD CULT: NORMAL

## 2021-09-16 DIAGNOSIS — E84.9 CYSTIC FIBROSIS (HCC): ICD-10-CM

## 2021-09-19 RX ORDER — ALPRAZOLAM 1 MG/1
TABLET ORAL
Qty: 60 TABLET | Refills: 1 | Status: SHIPPED | OUTPATIENT
Start: 2021-09-19 | End: 2022-01-03 | Stop reason: SDUPTHER

## 2021-09-21 ENCOUNTER — HOSPITAL ENCOUNTER (INPATIENT)
Facility: HOSPITAL | Age: 46
LOS: 4 days | Discharge: ANOTHER HOSPITAL | DRG: 641 | End: 2021-09-26
Attending: EMERGENCY MEDICINE | Admitting: FAMILY MEDICINE
Payer: COMMERCIAL

## 2021-09-21 ENCOUNTER — APPOINTMENT (EMERGENCY)
Dept: RADIOLOGY | Facility: HOSPITAL | Age: 46
DRG: 641 | End: 2021-09-21
Payer: COMMERCIAL

## 2021-09-21 DIAGNOSIS — K21.9 GASTROESOPHAGEAL REFLUX DISEASE WITHOUT ESOPHAGITIS: ICD-10-CM

## 2021-09-21 DIAGNOSIS — Z79.4 DIABETES MELLITUS DUE TO UNDERLYING CONDITION WITH CHRONIC KIDNEY DISEASE, WITH LONG-TERM CURRENT USE OF INSULIN, UNSPECIFIED CKD STAGE (HCC): ICD-10-CM

## 2021-09-21 DIAGNOSIS — I82.409 DVT (DEEP VENOUS THROMBOSIS) (HCC): ICD-10-CM

## 2021-09-21 DIAGNOSIS — F32.A DEPRESSION: ICD-10-CM

## 2021-09-21 DIAGNOSIS — E08.22 DIABETES MELLITUS DUE TO UNDERLYING CONDITION WITH CHRONIC KIDNEY DISEASE, WITH LONG-TERM CURRENT USE OF INSULIN, UNSPECIFIED CKD STAGE (HCC): ICD-10-CM

## 2021-09-21 DIAGNOSIS — R52 INTRACTABLE PAIN: Primary | ICD-10-CM

## 2021-09-21 DIAGNOSIS — R07.9 CHEST PAIN, UNSPECIFIED TYPE: ICD-10-CM

## 2021-09-21 DIAGNOSIS — Z94.2 LUNG TRANSPLANT RECIPIENT (HCC): ICD-10-CM

## 2021-09-21 DIAGNOSIS — Z94.2 LUNG TRANSPLANT STATUS, BILATERAL (HCC): ICD-10-CM

## 2021-09-21 DIAGNOSIS — E84.9 CF (CYSTIC FIBROSIS) (HCC): ICD-10-CM

## 2021-09-21 LAB
ALBUMIN SERPL BCP-MCNC: 3.6 G/DL (ref 3.5–5)
ALP SERPL-CCNC: 76 U/L (ref 46–116)
ALT SERPL W P-5'-P-CCNC: 35 U/L (ref 12–78)
ANION GAP SERPL CALCULATED.3IONS-SCNC: 9 MMOL/L (ref 4–13)
APTT PPP: 36 SECONDS (ref 23–37)
AST SERPL W P-5'-P-CCNC: 22 U/L (ref 5–45)
ATRIAL RATE: 85 BPM
BASOPHILS # BLD AUTO: 0.02 THOUSANDS/ΜL (ref 0–0.1)
BASOPHILS NFR BLD AUTO: 0 % (ref 0–1)
BILIRUB DIRECT SERPL-MCNC: 0.13 MG/DL (ref 0–0.2)
BILIRUB SERPL-MCNC: 0.38 MG/DL (ref 0.2–1)
BUN SERPL-MCNC: 22 MG/DL (ref 5–25)
CALCIUM SERPL-MCNC: 8.7 MG/DL (ref 8.3–10.1)
CHLORIDE SERPL-SCNC: 102 MMOL/L (ref 100–108)
CO2 SERPL-SCNC: 27 MMOL/L (ref 21–32)
CREAT SERPL-MCNC: 1.18 MG/DL (ref 0.6–1.3)
EOSINOPHIL # BLD AUTO: 0.11 THOUSAND/ΜL (ref 0–0.61)
EOSINOPHIL NFR BLD AUTO: 1 % (ref 0–6)
ERYTHROCYTE [DISTWIDTH] IN BLOOD BY AUTOMATED COUNT: 15.2 % (ref 11.6–15.1)
GFR SERPL CREATININE-BSD FRML MDRD: 57 ML/MIN/1.73SQ M
GLUCOSE SERPL-MCNC: 212 MG/DL (ref 65–140)
HCT VFR BLD AUTO: 37.4 % (ref 34.8–46.1)
HGB BLD-MCNC: 11.9 G/DL (ref 11.5–15.4)
IMM GRANULOCYTES # BLD AUTO: 0.06 THOUSAND/UL (ref 0–0.2)
IMM GRANULOCYTES NFR BLD AUTO: 1 % (ref 0–2)
INR PPP: 1.09 (ref 0.84–1.19)
LACTATE SERPL-SCNC: 0.9 MMOL/L (ref 0.5–2)
LYMPHOCYTES # BLD AUTO: 1.71 THOUSANDS/ΜL (ref 0.6–4.47)
LYMPHOCYTES NFR BLD AUTO: 18 % (ref 14–44)
MCH RBC QN AUTO: 28.2 PG (ref 26.8–34.3)
MCHC RBC AUTO-ENTMCNC: 31.8 G/DL (ref 31.4–37.4)
MCV RBC AUTO: 89 FL (ref 82–98)
MONOCYTES # BLD AUTO: 0.45 THOUSAND/ΜL (ref 0.17–1.22)
MONOCYTES NFR BLD AUTO: 5 % (ref 4–12)
NEUTROPHILS # BLD AUTO: 7.22 THOUSANDS/ΜL (ref 1.85–7.62)
NEUTS SEG NFR BLD AUTO: 75 % (ref 43–75)
NRBC BLD AUTO-RTO: 0 /100 WBCS
NT-PROBNP SERPL-MCNC: 168 PG/ML
P AXIS: 46 DEGREES
PLATELET # BLD AUTO: 162 THOUSANDS/UL (ref 149–390)
PMV BLD AUTO: 9.7 FL (ref 8.9–12.7)
POTASSIUM SERPL-SCNC: 4.3 MMOL/L (ref 3.5–5.3)
PR INTERVAL: 126 MS
PROT SERPL-MCNC: 7.8 G/DL (ref 6.4–8.2)
PROTHROMBIN TIME: 14.3 SECONDS (ref 11.6–14.5)
QRS AXIS: 65 DEGREES
QRSD INTERVAL: 70 MS
QT INTERVAL: 370 MS
QTC INTERVAL: 440 MS
RBC # BLD AUTO: 4.22 MILLION/UL (ref 3.81–5.12)
SARS-COV-2 RNA RESP QL NAA+PROBE: NEGATIVE
SODIUM SERPL-SCNC: 138 MMOL/L (ref 136–145)
T WAVE AXIS: 56 DEGREES
TROPONIN I SERPL-MCNC: <0.02 NG/ML
VENTRICULAR RATE: 85 BPM
WBC # BLD AUTO: 9.57 THOUSAND/UL (ref 4.31–10.16)

## 2021-09-21 PROCEDURE — 94640 AIRWAY INHALATION TREATMENT: CPT

## 2021-09-21 PROCEDURE — U0003 INFECTIOUS AGENT DETECTION BY NUCLEIC ACID (DNA OR RNA); SEVERE ACUTE RESPIRATORY SYNDROME CORONAVIRUS 2 (SARS-COV-2) (CORONAVIRUS DISEASE [COVID-19]), AMPLIFIED PROBE TECHNIQUE, MAKING USE OF HIGH THROUGHPUT TECHNOLOGIES AS DESCRIBED BY CMS-2020-01-R: HCPCS | Performed by: EMERGENCY MEDICINE

## 2021-09-21 PROCEDURE — 85025 COMPLETE CBC W/AUTO DIFF WBC: CPT | Performed by: EMERGENCY MEDICINE

## 2021-09-21 PROCEDURE — 99220 PR INITIAL OBSERVATION CARE/DAY 70 MINUTES: CPT | Performed by: INTERNAL MEDICINE

## 2021-09-21 PROCEDURE — U0005 INFEC AGEN DETEC AMPLI PROBE: HCPCS | Performed by: EMERGENCY MEDICINE

## 2021-09-21 PROCEDURE — 85730 THROMBOPLASTIN TIME PARTIAL: CPT | Performed by: EMERGENCY MEDICINE

## 2021-09-21 PROCEDURE — 36415 COLL VENOUS BLD VENIPUNCTURE: CPT | Performed by: EMERGENCY MEDICINE

## 2021-09-21 PROCEDURE — 80048 BASIC METABOLIC PNL TOTAL CA: CPT | Performed by: EMERGENCY MEDICINE

## 2021-09-21 PROCEDURE — 80076 HEPATIC FUNCTION PANEL: CPT | Performed by: EMERGENCY MEDICINE

## 2021-09-21 PROCEDURE — 93010 ELECTROCARDIOGRAM REPORT: CPT | Performed by: INTERNAL MEDICINE

## 2021-09-21 PROCEDURE — 85610 PROTHROMBIN TIME: CPT | Performed by: EMERGENCY MEDICINE

## 2021-09-21 PROCEDURE — 96374 THER/PROPH/DIAG INJ IV PUSH: CPT

## 2021-09-21 PROCEDURE — 99285 EMERGENCY DEPT VISIT HI MDM: CPT

## 2021-09-21 PROCEDURE — 99285 EMERGENCY DEPT VISIT HI MDM: CPT | Performed by: EMERGENCY MEDICINE

## 2021-09-21 PROCEDURE — 83605 ASSAY OF LACTIC ACID: CPT | Performed by: EMERGENCY MEDICINE

## 2021-09-21 PROCEDURE — 83880 ASSAY OF NATRIURETIC PEPTIDE: CPT | Performed by: EMERGENCY MEDICINE

## 2021-09-21 PROCEDURE — 71045 X-RAY EXAM CHEST 1 VIEW: CPT

## 2021-09-21 PROCEDURE — 93005 ELECTROCARDIOGRAM TRACING: CPT

## 2021-09-21 PROCEDURE — 84484 ASSAY OF TROPONIN QUANT: CPT | Performed by: EMERGENCY MEDICINE

## 2021-09-21 RX ORDER — ALBUTEROL SULFATE 2.5 MG/3ML
5 SOLUTION RESPIRATORY (INHALATION)
Status: DISCONTINUED | OUTPATIENT
Start: 2021-09-22 | End: 2021-09-26 | Stop reason: HOSPADM

## 2021-09-21 RX ORDER — ONDANSETRON 2 MG/ML
4 INJECTION INTRAMUSCULAR; INTRAVENOUS EVERY 6 HOURS PRN
Status: DISCONTINUED | OUTPATIENT
Start: 2021-09-21 | End: 2021-09-26 | Stop reason: HOSPADM

## 2021-09-21 RX ORDER — OXYCODONE HYDROCHLORIDE 10 MG/1
10 TABLET ORAL EVERY 4 HOURS PRN
Status: DISCONTINUED | OUTPATIENT
Start: 2021-09-21 | End: 2021-09-22

## 2021-09-21 RX ORDER — HEPARIN SODIUM 5000 [USP'U]/ML
5000 INJECTION, SOLUTION INTRAVENOUS; SUBCUTANEOUS EVERY 8 HOURS SCHEDULED
Status: DISCONTINUED | OUTPATIENT
Start: 2021-09-21 | End: 2021-09-21

## 2021-09-21 RX ORDER — PREGABALIN 25 MG/1
25 CAPSULE ORAL 3 TIMES DAILY
Status: DISCONTINUED | OUTPATIENT
Start: 2021-09-21 | End: 2021-09-22

## 2021-09-21 RX ORDER — ALPRAZOLAM 0.5 MG/1
1 TABLET ORAL 2 TIMES DAILY
Status: DISCONTINUED | OUTPATIENT
Start: 2021-09-21 | End: 2021-09-26 | Stop reason: HOSPADM

## 2021-09-21 RX ORDER — FAMOTIDINE 20 MG/1
20 TABLET, FILM COATED ORAL 2 TIMES DAILY
Status: DISCONTINUED | OUTPATIENT
Start: 2021-09-21 | End: 2021-09-26 | Stop reason: HOSPADM

## 2021-09-21 RX ORDER — ACETYLCYSTEINE 200 MG/ML
3 SOLUTION ORAL; RESPIRATORY (INHALATION)
Status: DISCONTINUED | OUTPATIENT
Start: 2021-09-22 | End: 2021-09-21

## 2021-09-21 RX ORDER — FENTANYL CITRATE 50 UG/ML
50 INJECTION, SOLUTION INTRAMUSCULAR; INTRAVENOUS ONCE
Status: COMPLETED | OUTPATIENT
Start: 2021-09-21 | End: 2021-09-21

## 2021-09-21 RX ORDER — TRAZODONE HYDROCHLORIDE 50 MG/1
25 TABLET ORAL
Status: DISCONTINUED | OUTPATIENT
Start: 2021-09-21 | End: 2021-09-26 | Stop reason: HOSPADM

## 2021-09-21 RX ORDER — VALACYCLOVIR HYDROCHLORIDE 500 MG/1
500 TABLET, FILM COATED ORAL DAILY
Status: DISCONTINUED | OUTPATIENT
Start: 2021-09-22 | End: 2021-09-26 | Stop reason: HOSPADM

## 2021-09-21 RX ORDER — ACETAMINOPHEN 325 MG/1
650 TABLET ORAL EVERY 6 HOURS PRN
Status: DISCONTINUED | OUTPATIENT
Start: 2021-09-21 | End: 2021-09-22

## 2021-09-21 RX ORDER — DRONABINOL 2.5 MG/1
5 CAPSULE ORAL 3 TIMES DAILY
Status: DISCONTINUED | OUTPATIENT
Start: 2021-09-21 | End: 2021-09-26 | Stop reason: HOSPADM

## 2021-09-21 RX ORDER — TACROLIMUS 1 MG/1
3 CAPSULE ORAL 2 TIMES DAILY
Status: DISCONTINUED | OUTPATIENT
Start: 2021-09-21 | End: 2021-09-26 | Stop reason: HOSPADM

## 2021-09-21 RX ORDER — HYDROMORPHONE HCL/PF 1 MG/ML
0.5 SYRINGE (ML) INJECTION EVERY 4 HOURS PRN
Status: DISCONTINUED | OUTPATIENT
Start: 2021-09-21 | End: 2021-09-22

## 2021-09-21 RX ORDER — ACETYLCYSTEINE 200 MG/ML
2 SOLUTION ORAL; RESPIRATORY (INHALATION)
Status: DISCONTINUED | OUTPATIENT
Start: 2021-09-22 | End: 2021-09-26 | Stop reason: HOSPADM

## 2021-09-21 RX ORDER — IPRATROPIUM BROMIDE AND ALBUTEROL SULFATE 2.5; .5 MG/3ML; MG/3ML
3 SOLUTION RESPIRATORY (INHALATION) ONCE
Status: COMPLETED | OUTPATIENT
Start: 2021-09-21 | End: 2021-09-21

## 2021-09-21 RX ORDER — OXYCODONE HYDROCHLORIDE 5 MG/1
5 TABLET ORAL EVERY 4 HOURS PRN
Status: DISCONTINUED | OUTPATIENT
Start: 2021-09-21 | End: 2021-09-22

## 2021-09-21 RX ORDER — POLYETHYLENE GLYCOL 3350 17 G/17G
17 POWDER, FOR SOLUTION ORAL 2 TIMES DAILY PRN
Status: DISCONTINUED | OUTPATIENT
Start: 2021-09-21 | End: 2021-09-26 | Stop reason: HOSPADM

## 2021-09-21 RX ORDER — PREDNISONE 10 MG/1
10 TABLET ORAL DAILY
Status: DISCONTINUED | OUTPATIENT
Start: 2021-09-22 | End: 2021-09-26 | Stop reason: HOSPADM

## 2021-09-21 RX ORDER — HYDROMORPHONE HCL/PF 1 MG/ML
1 SYRINGE (ML) INJECTION ONCE
Status: COMPLETED | OUTPATIENT
Start: 2021-09-21 | End: 2021-09-21

## 2021-09-21 RX ORDER — ATOVAQUONE 750 MG/5ML
1500 SUSPENSION ORAL DAILY
Status: DISCONTINUED | OUTPATIENT
Start: 2021-09-22 | End: 2021-09-26 | Stop reason: HOSPADM

## 2021-09-21 RX ORDER — PANTOPRAZOLE SODIUM 40 MG/1
40 TABLET, DELAYED RELEASE ORAL
Status: DISCONTINUED | OUTPATIENT
Start: 2021-09-22 | End: 2021-09-26 | Stop reason: HOSPADM

## 2021-09-21 RX ADMIN — HYDROMORPHONE HYDROCHLORIDE 1 MG: 1 INJECTION, SOLUTION INTRAMUSCULAR; INTRAVENOUS; SUBCUTANEOUS at 18:00

## 2021-09-21 RX ADMIN — OXYCODONE HYDROCHLORIDE 10 MG: 10 TABLET ORAL at 20:11

## 2021-09-21 RX ADMIN — TACROLIMUS 3 MG: 1 CAPSULE ORAL at 22:27

## 2021-09-21 RX ADMIN — ALPRAZOLAM 1 MG: 0.5 TABLET ORAL at 21:25

## 2021-09-21 RX ADMIN — FAMOTIDINE 20 MG: 20 TABLET ORAL at 21:26

## 2021-09-21 RX ADMIN — HYDROMORPHONE HYDROCHLORIDE 0.5 MG: 1 INJECTION, SOLUTION INTRAMUSCULAR; INTRAVENOUS; SUBCUTANEOUS at 21:27

## 2021-09-21 RX ADMIN — IPRATROPIUM BROMIDE AND ALBUTEROL SULFATE 3 ML: 2.5; .5 SOLUTION RESPIRATORY (INHALATION) at 16:25

## 2021-09-21 RX ADMIN — DRONABINOL 5 MG: 2.5 CAPSULE ORAL at 21:25

## 2021-09-21 RX ADMIN — PREGABALIN 25 MG: 25 CAPSULE ORAL at 21:27

## 2021-09-21 RX ADMIN — FENTANYL CITRATE 50 MCG: 50 INJECTION INTRAMUSCULAR; INTRAVENOUS at 16:22

## 2021-09-21 RX ADMIN — APIXABAN 5 MG: 5 TABLET, FILM COATED ORAL at 21:27

## 2021-09-21 NOTE — H&P
[address] [place] [address]  H&P- [facility] 1975, 39 y o  female MRN: [id number]  Unit/Bed#: ED 13 Encounter: [id number]  Primary Care Provider: No primary care provider on file  Date and time admitted to hospital: 9/21/2021  3:36 PM    Gastroesophageal reflux disease without esophagitis  Assessment & Plan  C/w PPI therapy    Cystic fibrosis ([facility] )  Assessment & Plan  C/w home creon    Chronic kidney disease  Assessment & Plan  Lab Results   Component Value Date    EGFR 57 09/21/2021    EGFR 58 09/09/2021    EGFR 43 06/13/2021    CREATININE 1 18 09/21/2021    CREATININE 1 16 09/09/2021    CREATININE 1 48 (H) 06/13/2021   kidney function currently at baseline    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, with long-term current use of insulin ([facility] )  Assessment & Plan  Lab Results   Component Value Date    HGBA1C 6 6 (H) 04/01/2020       No results for input(s): POCGLU in the last 72 hours  Blood Sugar Average: Last 72 hrs:  c/w home insulin regimen  SSI    [facility], [facility] ([facility] )  Assessment & Plan  Undergone b/l lung transplant 2013  C/w suppresive meds    * Chest pain  Assessment & Plan  Presented with significant pain  ED discussed with transplant team and recommended admission here for pain control  Will place on oxy prn for moderate/severe pain  Will provide iv dilaudid for breakthrough pain       VTE Prophylaxis: Heparin  / sequential compression device   Code Status: full code  POLST: There is no POLST form on file for this patient (pre-hospital)  Discussion with family: pt    Anticipated Length of Stay:  Patient will be admitted on an Observation basis with an anticipated length of stay of  < 2 midnights  Justification for Hospital Stay: chest pain    Total Time for Visit, including Counseling / Coordination of Care: 60 minutes    Greater than 50% of this total time spent on direct patient counseling and coordination of care     Chief Complaint:   Chest pain    History of Present Illness:    Agus Marcial is a 39 y o  female with PMH of b/l lung transplant in 2013 who presented with chest pain worse with movement and palpation  No other complaints at this time    Review of Systems:    Review of Systems   Constitutional: Negative for activity change, appetite change, chills, diaphoresis, fever and unexpected weight change  HENT: Negative for congestion, facial swelling and rhinorrhea  Eyes: Negative for photophobia and visual disturbance  Respiratory: Negative for cough, shortness of breath and wheezing  Cardiovascular: Positive for chest pain  Negative for palpitations  Gastrointestinal: Negative for abdominal pain, blood in stool, constipation, diarrhea, nausea and vomiting  Genitourinary: Negative for decreased urine volume, difficulty urinating, dysuria, flank pain, frequency, hematuria and urgency  Musculoskeletal: Negative for arthralgias, back pain, joint swelling and myalgias  Neurological: Negative for dizziness, syncope, facial asymmetry, light-headedness, numbness and headaches  Psychiatric/Behavioral: Negative for confusion and decreased concentration  The patient is not nervous/anxious  Past Medical and Surgical History:     Past Medical History:   Diagnosis Date    ABPA (allergic bronchopulmonary aspergillosis) ([facility] ) 7/8/2012    Acute kidney injury ([facility] ) 9/8/2017    Acute on chronic respiratory failure ([facility] ) 3/4/2017    Last Assessment & Plan:  Formatting of this note might be different from the original  Recurrent with clinical worsening with enlarging PTX, s/p multiple chest tubes, but new fevers 6/19 and worsening leukocytosis indicating infection as cause of 6/19 decompensation with profound hypoxia in setting of shunting through right lung and continued pneumothorax s/p 2nd chest tube placement    6/27: Right    Asthma     Chronic pain     Cystic fibrosis ([facility] )     Diabetes mellitus [facility] )     GERD (gastroesophageal reflux disease)     HBP (high blood pressure)     HCAP (healthcare-associated pneumonia) 3/6/2020    Influenza B 2/23/2020    Kidney stone     Neutropenic fever ([facility] ) 9/26/2020    PONV (postoperative nausea and vomiting)     Pseudomonas aeruginosa infection 9/20/2012    Transplant recipient     Lung- 2013, 2017       Past Surgical History:   Procedure Laterality Date    APPENDECTOMY      BREAST SURGERY      Aug      BRONCHOSCOPY      CHOLECYSTECTOMY      COLONOSCOPY      EGD  07/2019    Yeast Infect   FL RETROGRADE PYELOGRAM  1/9/2020    FL RETROGRADE PYELOGRAM  2/4/2020    GASTROSTOMY TUBE, PLACE      LUNG SURGERY      LUNG TRANSPLANT      X2    CT CYSTO/URETERO W/LITHOTRIPSY &INDWELL STENT INSRT Right 2/4/2020    Procedure: CYSTOSCOPY URETEROSCOPY WITH LITHOTRIPSY HOLMIUM LASER, RETROGRADE PYELOGRAM AND INSERTION STENT URETERAL;  Surgeon: Loi Deleon MD;  Location: [facility];  Service: Urology    CT CYSTOURETHROSCOPY,URETER CATHETER Right 1/9/2020    Procedure: CYSTOSCOPY RETROGRADE PYELOGRAM WITH INSERTION STENT URETERAL;  Surgeon: Loi Deleon MD;  Location: [facility];  Service: Urology       Meds/Allergies:    Prior to Admission medications    Medication Sig Start Date End Date Taking?  Authorizing Provider   acetylcysteine (MUCOMYST) 200 mg/mL nebulizer solution Mix with 2 mL sodium chloride 0 3% nebs & administer twice daily with albuterol via nebulizer 7/2/21   Historical Provider, MD   albuterol (5 mg/mL) 0 5 % nebulizer solution Inhale 2 5 mg 2 (two) times a day    Historical Provider, MD   albuterol (ACCUNEB) 1 25 MG/3ML nebulizer solution Take 1 ampule by nebulization every 4 (four) hours as needed for wheezing    Historical Provider, MD   alendronate (FOSAMAX) 70 mg tablet Take 70 mg by mouth every 7 days    Historical Provider, MD   alendronate (FOSAMAX) 70 mg tablet Take 70 mg by mouth every 7 days 5/25/21 Historical Provider, MD   ALPRAZolam Cabrera [place]a) 1 mg tablet TAKE 1 TABLET BY MOUTH TWICE A DAY 9/19/21   Johanna Araiza MD   apixaban (ELIQUIS) 5 mg Take 5 mg by mouth 2 (two) times a day 5 mg Bid    Historical Provider, MD   atovaquone (MEPRON) 750 mg/5 mL suspension Take 1,500 mg by mouth daily    Historical Provider, MD   Cannabinoids (THC FREE) 20 MG/ML LIQD Take by mouth    Historical Provider, MD   colistimethate (COLY-MYCIN M) inhalation solution Take 150 mg by nebulization every 12 (twelve) hours  Patient not taking: Reported on 9/7/2021    Historical Provider, MD   dexlansoprazole (DEXILANT) 60 MG capsule Take 60 mg by mouth daily    Historical Provider, MD   dexlansoprazole (DEXILANT) 60 MG capsule Take 60 mg by mouth 7/30/21   Historical Provider, MD   Diclofenac Sodium (VOLTAREN) 1 % Apply 2 g topically 4 (four) times a day 9/7/21   Quentin Jeffrey MD   dronabinol (MARINOL) 5 MG capsule TAKE 1 CAPSULE (5 MG TOTAL) BY MOUTH 3 (THREE) TIMES A DAY 8/19/21   Johanna Araiza MD   escitalopram (LEXAPRO) 10 mg tablet Take 10 mg by mouth daily  Patient not taking: Reported on 9/7/2021 11/27/20   Historical Provider, MD   esomeprazole (NexIUM) 40 MG capsule Take 40 mg by mouth daily 8/6/21   Historical Provider, MD   famotidine (PEPCID) 20 mg tablet Take 20 mg by mouth 2 (two) times a day    Historical Provider, MD   ferrous sulfate 325 (65 Fe) mg tablet Take 325 mg by mouth daily with breakfast    Historical Provider, MD   HYDROcodone-acetaminophen (NORCO) 5-325 mg per tablet Take 1 tablet by mouth every 6 (six) hours as needed for painMax Daily Amount: 4 tablets  Patient not taking: Reported on 9/7/2021 6/10/21   Nancy Hurst MD   insulin aspart (NovoLOG) 100 units/mL injection Inject under the skin 3 (three) times a day before meals    Historical Provider, MD   insulin aspart, w/niacinamide, (FIASP) 100 Units/mL injection pen Inject 4 unites with breakfast, 5 unites with lunch & dinner   LOW SLIDING SCALE correction with meals & bedtime 7/1/21   Historical Provider, MD   magnesium oxide (MAG-OX) 400 mg Take 400 mg by mouth 2 (two) times a day    Historical Provider, MD   Multiple Vitamin (MULTIVITAMIN) tablet Take 1 tablet by mouth daily    Historical Provider, MD   naloxone (NARCAN) 4 mg/0 1 mL nasal spray 4 mg into each nostril 7/2/21   Historical Provider, MD   NON FORMULARY Discontinue supplemental oxygen 4/1/21   Historical Provider, MD   ondansetron (ZOFRAN-ODT) 4 mg disintegrating tablet Take 1 tablet (4 mg total) by mouth every 6 (six) hours as needed for nausea or vomiting 2/23/21   Isaías Adler MD   oxyCODONE (ROXICODONE) 10 MG TABS Take 10 mg by mouth 9/2/21   Historical Provider, MD   pancrelipase, Lip-Prot-Amyl, (CREON) 24,000 units Take 48,000 Units by mouth 3 (three) times a day with meals    Historical Provider, MD   pancrelipase, Lip-Prot-Amyl, (CREON) 24,000 units Take 2 capsules by mouth 7/7/21   Historical Provider, MD   polyethylene glycol (GLYCOLAX) 17 GM/SCOOP powder Take 17 g by mouth daily 7/1/21   Historical Provider, MD   polyethylene glycol (MIRALAX) 17 g packet Take 17 g by mouth 2 (two) times a day as needed    Historical Provider, MD   predniSONE 10 mg tablet Take 10 mg by mouth daily 4/27/21   Historical Provider, MD   predniSONE 20 mg tablet TAKE 1 TAB 3X/DAY X 3 DAYS, 1 TAB TWICE DAILY X 3 DAYS, 1 TAB DAILY X 3 DAYS 4/27/21   Tanvi Kendall MD   pregabalin (LYRICA) 25 mg capsule Take 25 mg by mouth Three times a day 9/2/21   Historical Provider, MD   Respiratory Therapy Supplies (Nebulizer) SUKHI Please dispense nebulizer machine 7/16/21   Historical Provider, MD   Sennosides 17 2 MG TABS Take 17 2 mg by mouth 2 (two) times a day 7/1/21   Historical Provider, MD   simethicone (MYLICON) 80 mg chewable tablet Chew 80 mg  Patient not taking: Reported on 9/7/2021 7/1/21   Historical Provider, MD   sodium chloride 3 % inhalation solution Inhale 4 mL 2 (two) times a day 7/1/21 Historical Provider, MD   SYRINGE-NEEDLE, DISP, 3 ML 23G X 1" 3 ML MISC Use twice daily to draw up acetylcysteine 6/30/21   Historical Provider, MD   tacrolimus (PROGRAF) 1 mg capsule Take 3 mg by mouth 2 (two) times a day 7/1/21   Historical Provider, MD   tacrolimus (PROGRAF) 5 mg capsule Take 2 5 mg by mouth daily with breakfast  Patient not taking: Reported on 9/7/2021    Historical Provider, MD   tacrolimus (PROGRAF) 5 mg capsule Take 3 mg by mouth daily with dinner  Patient not taking: Reported on 9/7/2021    Historical Provider, MD   traZODone (DESYREL) 50 mg tablet Take 25 mg by mouth 10/19/20   Historical Provider, MD   valACYclovir (VALTREX) 500 mg tablet Take 500 mg by mouth daily 6/1/21   Historical Provider, MD     I have reviewed home medications with patient personally  Allergies:    Allergies   Allergen Reactions    Vancomycin Angioedema and Hives    Ceftazidime Headache     Severe headaches after desensitization       Social History:     Marital Status: /Civil Union     Patient Pre-hospital Living Situation: home  Patient Pre-hospital Level of Mobility: independent  Patient Pre-hospital Diet Restrictions: none  Substance Use History:   Social History     Substance and Sexual Activity   Alcohol Use Yes    Comment: social     Social History     Tobacco Use   Smoking Status Former Smoker    Packs/day: 0 50    Years: 12 00    Pack years: 6 00   Smokeless Tobacco Never Used     Social History     Substance and Sexual Activity   Drug Use Yes    Frequency: 2 0 times per week    Types: Marijuana    Comment: medical edible marijuana prescribed       Family History:    Family History   Problem Relation Age of Onset    Alcohol abuse Mother     Mental illness Mother     Alcohol abuse Father     COPD Maternal Grandmother     COPD Maternal Grandfather     COPD Paternal Grandmother     Breast cancer Neg Hx     Ovarian cancer Neg Hx     Colon cancer Neg Hx        Physical Exam:     Vitals: NEUTROS PCT % 75   LYMPHS PCT % 18   MONOS PCT % 5   EOS PCT % 1     Results from last 7 days   Lab Units 09/21/21  1613   SODIUM mmol/L 138   POTASSIUM mmol/L 4 3   CHLORIDE mmol/L 102   CO2 mmol/L 27   BUN mg/dL 22   CREATININE mg/dL 1 18   ANION GAP mmol/L 9   CALCIUM mg/dL 8 7   ALBUMIN g/dL 3 6   TOTAL BILIRUBIN mg/dL 0 38   ALK PHOS U/L 76   ALT U/L 35   AST U/L 22   GLUCOSE RANDOM mg/dL 212*     Results from last 7 days   Lab Units 09/21/21  1613   INR  1 09             Results from last 7 days   Lab Units 09/21/21  1613   LACTIC ACID mmol/L 0 9       Imaging: I have personally reviewed pertinent reports  XR chest 1 view portable   Final Result by Camilla Gracia MD (09/21 1618)      Volume loss on the right side unchanged  Stable chronic right lung base opacities  No pneumothorax  Workstation performed: [id number]             EKG, Pathology, and Other Studies Reviewed on Admission:   · EKG: reviewed    Allscripts / Epic Records Reviewed: Yes     ** Please Note: This note has been constructed using a voice recognition system   **

## 2021-09-21 NOTE — ASSESSMENT & PLAN NOTE
Lab Results   Component Value Date    HGBA1C 6 6 (H) 04/01/2020       No results for input(s): POCGLU in the last 72 hours      Blood Sugar Average: Last 72 hrs:  c/w home insulin regimen  SSI

## 2021-09-21 NOTE — ASSESSMENT & PLAN NOTE
Lab Results   Component Value Date    EGFR 57 09/21/2021    EGFR 58 09/09/2021    EGFR 43 06/13/2021    CREATININE 1 18 09/21/2021    CREATININE 1 16 09/09/2021    CREATININE 1 48 (H) 06/13/2021   kidney function currently at baseline

## 2021-09-21 NOTE — ASSESSMENT & PLAN NOTE
Presented with significant pain  ED discussed with transplant team and recommended admission here for pain control  Will place on oxy prn for moderate/severe pain  Will provide iv dilaudid for breakthrough pain

## 2021-09-22 ENCOUNTER — TELEPHONE (OUTPATIENT)
Dept: INTERNAL MEDICINE CLINIC | Facility: CLINIC | Age: 46
End: 2021-09-22

## 2021-09-22 PROBLEM — K21.9 GASTROESOPHAGEAL REFLUX DISEASE WITHOUT ESOPHAGITIS: Status: RESOLVED | Noted: 2021-09-07 | Resolved: 2021-09-22

## 2021-09-22 LAB
ANION GAP SERPL CALCULATED.3IONS-SCNC: 8 MMOL/L (ref 4–13)
BASOPHILS # BLD AUTO: 0.02 THOUSANDS/ΜL (ref 0–0.1)
BASOPHILS NFR BLD AUTO: 0 % (ref 0–1)
BUN SERPL-MCNC: 25 MG/DL (ref 5–25)
CALCIUM SERPL-MCNC: 8.5 MG/DL (ref 8.3–10.1)
CHLORIDE SERPL-SCNC: 104 MMOL/L (ref 100–108)
CO2 SERPL-SCNC: 27 MMOL/L (ref 21–32)
CREAT SERPL-MCNC: 1.28 MG/DL (ref 0.6–1.3)
EOSINOPHIL # BLD AUTO: 0.16 THOUSAND/ΜL (ref 0–0.61)
EOSINOPHIL NFR BLD AUTO: 2 % (ref 0–6)
ERYTHROCYTE [DISTWIDTH] IN BLOOD BY AUTOMATED COUNT: 15.3 % (ref 11.6–15.1)
GFR SERPL CREATININE-BSD FRML MDRD: 51 ML/MIN/1.73SQ M
GLUCOSE P FAST SERPL-MCNC: 131 MG/DL (ref 65–99)
GLUCOSE SERPL-MCNC: 131 MG/DL (ref 65–140)
GLUCOSE SERPL-MCNC: 140 MG/DL (ref 65–140)
GLUCOSE SERPL-MCNC: 169 MG/DL (ref 65–140)
GLUCOSE SERPL-MCNC: 201 MG/DL (ref 65–140)
GLUCOSE SERPL-MCNC: 309 MG/DL (ref 65–140)
HCT VFR BLD AUTO: 34 % (ref 34.8–46.1)
HGB BLD-MCNC: 10.8 G/DL (ref 11.5–15.4)
IMM GRANULOCYTES # BLD AUTO: 0.04 THOUSAND/UL (ref 0–0.2)
IMM GRANULOCYTES NFR BLD AUTO: 1 % (ref 0–2)
LYMPHOCYTES # BLD AUTO: 2.61 THOUSANDS/ΜL (ref 0.6–4.47)
LYMPHOCYTES NFR BLD AUTO: 38 % (ref 14–44)
MCH RBC QN AUTO: 28.5 PG (ref 26.8–34.3)
MCHC RBC AUTO-ENTMCNC: 31.8 G/DL (ref 31.4–37.4)
MCV RBC AUTO: 90 FL (ref 82–98)
MONOCYTES # BLD AUTO: 0.52 THOUSAND/ΜL (ref 0.17–1.22)
MONOCYTES NFR BLD AUTO: 8 % (ref 4–12)
NEUTROPHILS # BLD AUTO: 3.57 THOUSANDS/ΜL (ref 1.85–7.62)
NEUTS SEG NFR BLD AUTO: 51 % (ref 43–75)
NRBC BLD AUTO-RTO: 0 /100 WBCS
PLATELET # BLD AUTO: 133 THOUSANDS/UL (ref 149–390)
PMV BLD AUTO: 9.8 FL (ref 8.9–12.7)
POTASSIUM SERPL-SCNC: 4.5 MMOL/L (ref 3.5–5.3)
RBC # BLD AUTO: 3.79 MILLION/UL (ref 3.81–5.12)
SODIUM SERPL-SCNC: 139 MMOL/L (ref 136–145)
WBC # BLD AUTO: 6.92 THOUSAND/UL (ref 4.31–10.16)

## 2021-09-22 PROCEDURE — 94760 N-INVAS EAR/PLS OXIMETRY 1: CPT

## 2021-09-22 PROCEDURE — 99221 1ST HOSP IP/OBS SF/LOW 40: CPT | Performed by: NURSE PRACTITIONER

## 2021-09-22 PROCEDURE — 82948 REAGENT STRIP/BLOOD GLUCOSE: CPT

## 2021-09-22 PROCEDURE — 80048 BASIC METABOLIC PNL TOTAL CA: CPT | Performed by: INTERNAL MEDICINE

## 2021-09-22 PROCEDURE — 94664 DEMO&/EVAL PT USE INHALER: CPT

## 2021-09-22 PROCEDURE — 99222 1ST HOSP IP/OBS MODERATE 55: CPT | Performed by: NURSE PRACTITIONER

## 2021-09-22 PROCEDURE — 94640 AIRWAY INHALATION TREATMENT: CPT

## 2021-09-22 PROCEDURE — 85025 COMPLETE CBC W/AUTO DIFF WBC: CPT | Performed by: INTERNAL MEDICINE

## 2021-09-22 RX ORDER — ACETAMINOPHEN 325 MG/1
975 TABLET ORAL EVERY 8 HOURS SCHEDULED
Status: DISCONTINUED | OUTPATIENT
Start: 2021-09-22 | End: 2021-09-26 | Stop reason: HOSPADM

## 2021-09-22 RX ORDER — PREGABALIN 75 MG/1
75 CAPSULE ORAL 3 TIMES DAILY
Status: DISCONTINUED | OUTPATIENT
Start: 2021-09-22 | End: 2021-09-26 | Stop reason: HOSPADM

## 2021-09-22 RX ORDER — HYDROMORPHONE HCL/PF 1 MG/ML
1 SYRINGE (ML) INJECTION EVERY 4 HOURS PRN
Status: DISCONTINUED | OUTPATIENT
Start: 2021-09-22 | End: 2021-09-23

## 2021-09-22 RX ORDER — KETOROLAC TROMETHAMINE 30 MG/ML
15 INJECTION, SOLUTION INTRAMUSCULAR; INTRAVENOUS ONCE
Status: COMPLETED | OUTPATIENT
Start: 2021-09-22 | End: 2021-09-22

## 2021-09-22 RX ORDER — LIDOCAINE 50 MG/G
2 PATCH TOPICAL DAILY
Status: DISCONTINUED | OUTPATIENT
Start: 2021-09-22 | End: 2021-09-26 | Stop reason: HOSPADM

## 2021-09-22 RX ORDER — OXYCODONE HYDROCHLORIDE 10 MG/1
10 TABLET ORAL
Status: DISCONTINUED | OUTPATIENT
Start: 2021-09-22 | End: 2021-09-23

## 2021-09-22 RX ADMIN — PREGABALIN 25 MG: 25 CAPSULE ORAL at 08:12

## 2021-09-22 RX ADMIN — HYDROMORPHONE HYDROCHLORIDE 0.5 MG: 1 INJECTION, SOLUTION INTRAMUSCULAR; INTRAVENOUS; SUBCUTANEOUS at 06:31

## 2021-09-22 RX ADMIN — ACETAMINOPHEN 975 MG: 325 TABLET, FILM COATED ORAL at 21:42

## 2021-09-22 RX ADMIN — INSULIN LISPRO 4 UNITS: 100 INJECTION, SOLUTION INTRAVENOUS; SUBCUTANEOUS at 18:15

## 2021-09-22 RX ADMIN — ALBUTEROL SULFATE 5 MG: 2.5 SOLUTION RESPIRATORY (INHALATION) at 07:21

## 2021-09-22 RX ADMIN — PREGABALIN 75 MG: 75 CAPSULE ORAL at 15:57

## 2021-09-22 RX ADMIN — HYDROMORPHONE HYDROCHLORIDE 1 MG: 1 INJECTION, SOLUTION INTRAMUSCULAR; INTRAVENOUS; SUBCUTANEOUS at 20:15

## 2021-09-22 RX ADMIN — KETOROLAC TROMETHAMINE 15 MG: 30 INJECTION, SOLUTION INTRAMUSCULAR; INTRAVENOUS at 12:01

## 2021-09-22 RX ADMIN — ACETAMINOPHEN 975 MG: 325 TABLET, FILM COATED ORAL at 13:36

## 2021-09-22 RX ADMIN — TACROLIMUS 3 MG: 1 CAPSULE ORAL at 18:15

## 2021-09-22 RX ADMIN — APIXABAN 5 MG: 5 TABLET, FILM COATED ORAL at 08:12

## 2021-09-22 RX ADMIN — ONDANSETRON 4 MG: 2 INJECTION INTRAMUSCULAR; INTRAVENOUS at 18:27

## 2021-09-22 RX ADMIN — ACETYLCYSTEINE 400 MG: 200 SOLUTION ORAL; RESPIRATORY (INHALATION) at 07:21

## 2021-09-22 RX ADMIN — PANCRELIPASE 48000 UNITS: 24000; 76000; 120000 CAPSULE, DELAYED RELEASE PELLETS ORAL at 15:58

## 2021-09-22 RX ADMIN — ATOVAQUONE 1500 MG: 750 SUSPENSION ORAL at 08:15

## 2021-09-22 RX ADMIN — PANCRELIPASE 48000 UNITS: 24000; 76000; 120000 CAPSULE, DELAYED RELEASE PELLETS ORAL at 12:01

## 2021-09-22 RX ADMIN — OXYCODONE HYDROCHLORIDE 15 MG: 10 TABLET ORAL at 13:37

## 2021-09-22 RX ADMIN — APIXABAN 5 MG: 5 TABLET, FILM COATED ORAL at 18:16

## 2021-09-22 RX ADMIN — DRONABINOL 5 MG: 2.5 CAPSULE ORAL at 15:57

## 2021-09-22 RX ADMIN — ACETYLCYSTEINE 400 MG: 200 SOLUTION ORAL; RESPIRATORY (INHALATION) at 20:16

## 2021-09-22 RX ADMIN — INSULIN LISPRO 4 UNITS: 100 INJECTION, SOLUTION INTRAVENOUS; SUBCUTANEOUS at 12:04

## 2021-09-22 RX ADMIN — LIDOCAINE 5% 2 PATCH: 700 PATCH TOPICAL at 13:39

## 2021-09-22 RX ADMIN — HYDROMORPHONE HYDROCHLORIDE 1 MG: 1 INJECTION, SOLUTION INTRAMUSCULAR; INTRAVENOUS; SUBCUTANEOUS at 10:18

## 2021-09-22 RX ADMIN — PREDNISONE 10 MG: 10 TABLET ORAL at 08:18

## 2021-09-22 RX ADMIN — DRONABINOL 5 MG: 2.5 CAPSULE ORAL at 20:15

## 2021-09-22 RX ADMIN — HYDROMORPHONE HYDROCHLORIDE 0.5 MG: 1 INJECTION, SOLUTION INTRAMUSCULAR; INTRAVENOUS; SUBCUTANEOUS at 02:17

## 2021-09-22 RX ADMIN — ALPRAZOLAM 1 MG: 0.5 TABLET ORAL at 18:16

## 2021-09-22 RX ADMIN — PANCRELIPASE 48000 UNITS: 24000; 76000; 120000 CAPSULE, DELAYED RELEASE PELLETS ORAL at 08:14

## 2021-09-22 RX ADMIN — HYDROMORPHONE HYDROCHLORIDE 1 MG: 1 INJECTION, SOLUTION INTRAMUSCULAR; INTRAVENOUS; SUBCUTANEOUS at 15:57

## 2021-09-22 RX ADMIN — DRONABINOL 5 MG: 2.5 CAPSULE ORAL at 08:12

## 2021-09-22 RX ADMIN — POLYETHYLENE GLYCOL 3350 17 G: 17 POWDER, FOR SOLUTION ORAL at 18:15

## 2021-09-22 RX ADMIN — ALPRAZOLAM 1 MG: 0.5 TABLET ORAL at 08:12

## 2021-09-22 RX ADMIN — ALBUTEROL SULFATE 5 MG: 2.5 SOLUTION RESPIRATORY (INHALATION) at 20:17

## 2021-09-22 RX ADMIN — OXYCODONE HYDROCHLORIDE 10 MG: 10 TABLET ORAL at 01:24

## 2021-09-22 RX ADMIN — OXYCODONE HYDROCHLORIDE 15 MG: 10 TABLET ORAL at 21:41

## 2021-09-22 RX ADMIN — TACROLIMUS 3 MG: 1 CAPSULE ORAL at 08:13

## 2021-09-22 RX ADMIN — OXYCODONE HYDROCHLORIDE 10 MG: 10 TABLET ORAL at 05:34

## 2021-09-22 RX ADMIN — FAMOTIDINE 20 MG: 20 TABLET ORAL at 08:12

## 2021-09-22 RX ADMIN — VALACYCLOVIR HYDROCHLORIDE 500 MG: 500 TABLET, FILM COATED ORAL at 08:12

## 2021-09-22 RX ADMIN — OXYCODONE HYDROCHLORIDE 15 MG: 10 TABLET ORAL at 18:16

## 2021-09-22 RX ADMIN — TRAZODONE HYDROCHLORIDE 25 MG: 50 TABLET ORAL at 21:41

## 2021-09-22 RX ADMIN — PANTOPRAZOLE SODIUM 40 MG: 40 TABLET, DELAYED RELEASE ORAL at 05:34

## 2021-09-22 RX ADMIN — FAMOTIDINE 20 MG: 20 TABLET ORAL at 18:16

## 2021-09-22 RX ADMIN — PREGABALIN 75 MG: 75 CAPSULE ORAL at 20:15

## 2021-09-22 RX ADMIN — INSULIN LISPRO 4 UNITS: 100 INJECTION, SOLUTION INTRAVENOUS; SUBCUTANEOUS at 08:16

## 2021-09-22 NOTE — ASSESSMENT & PLAN NOTE
Lab Results   Component Value Date    EGFR 51 09/22/2021    EGFR 57 09/21/2021    EGFR 58 09/09/2021    CREATININE 1 28 09/22/2021    CREATININE 1 18 09/21/2021    CREATININE 1 16 09/09/2021     · Chronic  · Baseline creatinine noted to be 1 0-1 2  · Creatinine on admission noted to be 1 18  · Monitor closely  · Did give patient 1 x dose of Toradol for pain

## 2021-09-22 NOTE — CASE MANAGEMENT
Case Management Assessment & Discharge Planning Note    Patient name Agus Manifold  Location [address] [id number] MRN [id number]  : 1975 Date 2021       Current Admission Date: 2021  Current Admission Diagnosis:  Chest pain  Previous Admission - Discharge Date:21   LOS (days): 0  Geometric Mean LOS (GMLOS) (days):   Days to GMLOS: Previous Discharge Diagnosis:  There are no discharge diagnoses documented for the most recent discharge  OBJECTIVE:        Bundle(if applicable):    Current admission status: Observation       Preferred Pharmacy:   [id number] [address]  [address], [address] [place] [address]  Phone: [phone] Fax: [phone]    Primary Care Provider: No primary care provider on file  Primary Insurance: BLUE CROSS  Secondary Insurance: MEDICARE    ASSESSMENT:  [address], [address] Representative - Spouse   Primary Phone: [phone] (Mobile)               Advance Directives  Does patient have a [address] [place] [address] Avenue?: No  Was patient offered paperwork?: No (Patient refused paperwork )  Does patient currently have a Health Care decision maker?: Yes, please see Health Care Proxy section  Does patient have Advance Directives?: Yes  Advance Directives: Living will    Readmission Root Cause  30 Day Readmission: No    Patient Information  Admitted from[de-identified] Home  Mental Status: Alert  During Assessment patient was accompanied by: Not accompanied during assessment  Assessment information provided by[de-identified] Patient  Primary Caregiver: Self  Support Systems: Spouse/significant other  [place] of Residence: [place] [address] do you live in?: [address] [facility] entry access options   Select all that apply : Stairs  Number of steps to enter home : 5  Do the steps have railings?: Yes  Type of Current Residence: [address] [place] [address]  Upon entering residence, is there a bedroom on the main floor (no further steps)?: Yes  Upon entering residence, is there a bathroom on the main floor (no further steps)?: Yes  Living Arrangements: Lives w/ Spouse/significant other, Lives w/ Daughter  Is patient a ?: No    Activities of Daily Living Prior to Admission  Functional Status: Independent  Completes ADLs independently?: Yes  Ambulates independently?: Yes  Does patient use assisted devices?: No  Does patient currently own DME?: No  Does patient have a history of Outpatient Therapy (PT/OT)?: No  Does the patient have a history of Short-Term Rehab?: Yes ([facility])  Does patient have a history of HHC?: Yes ([facility])  Does patient currently have [facility]?: Yes    Current Home Health Care  Type of Current Home Care Services: Nurse visit  Current Home Health Agency[de-identified] [address] [place] [address] Provider[de-identified] PCP (Dr Farrah Rodriguez)    Patient Information Continued  Income Source: Self-employed  Does patient have prescription coverage?: Yes (Patient fills her prescriptions at [facility] in [place] and denied any barriers to obtaining or affording prescriptions )  Does patient receive dialysis treatments?: No  Does patient have a history of substance abuse?: No  Does patient have a history of Mental Health Diagnosis?: Yes (Anxiety)  Is patient receiving treatment for mental health?: No  Patient declined treatment information    Has patient received inpatient treatment related to mental health in the last 2 years?: No    Means of Transportation  Means of Transport to Appts[de-identified] Drives Self  In the past 12 months, has lack of transportation kept you from medical appointments or from getting medications?: No  In the past 12 months, has lack of transportation kept you from meetings, work, or from getting things needed for daily living?: No  Was application for public transport provided?: No (Patient's  provides transportation if needed )    DISCHARGE DETAILS:    Discharge planning discussed with[de-identified] Patient  Freedom of Choice: Yes (Patient denying any needs at this time )    Requested [address]         Is the patient interested in [facility] at discharge?: No    DME Referral Provided  Referral made for DME?: No    We would like to be able to fill any required prescriptions on discharge at our [address] [place] [address] and have them delivered to you at discharge in your room    Would you like to participate in this program? : No - Declined    Discharge Destination Plan[de-identified] Home    Type of Transport: Family

## 2021-09-22 NOTE — PLAN OF CARE
Problem: PAIN - ADULT  Goal: Verbalizes/displays adequate comfort level or baseline comfort level  Description: Interventions:  - Encourage patient to monitor pain and request assistance  - Assess pain using appropriate pain scale  - Administer analgesics based on type and severity of pain and evaluate response  - Implement non-pharmacological measures as appropriate and evaluate response  - Consider cultural and social influences on pain and pain management  - Notify physician/advanced practitioner if interventions unsuccessful or patient reports new pain  Outcome: Progressing     Problem: INFECTION - ADULT  Goal: Absence or prevention of progression during hospitalization  Description: INTERVENTIONS:  - Assess and monitor for signs and symptoms of infection  - Monitor lab/diagnostic results  - Monitor all insertion sites, i e  indwelling lines, tubes, and drains  - Monitor endotracheal if appropriate and nasal secretions for changes in amount and color  - [place] appropriate cooling/warming therapies per order  - Administer medications as ordered  - Instruct and encourage patient and family to use good hand hygiene technique  - Identify and instruct in appropriate isolation precautions for identified infection/condition  Outcome: Progressing  Goal: Absence of fever/infection during neutropenic period  Description: INTERVENTIONS:  - Monitor WBC    Outcome: Progressing     Problem: DISCHARGE PLANNING  Goal: Discharge to home or other facility with appropriate resources  Description: INTERVENTIONS:  - Identify barriers to discharge w/patient and caregiver  - Arrange for needed discharge resources and transportation as appropriate  - Identify discharge learning needs (meds, wound care, etc )  - Arrange for interpretive services to assist at discharge as needed  - Refer to Case Management Department for coordinating discharge planning if the patient needs post-hospital services based on physician/advanced practitioner order or complex needs related to functional status, cognitive ability, or social support system  Outcome: Progressing     Problem: Knowledge Deficit  Goal: Patient/family/caregiver demonstrates understanding of disease process, treatment plan, medications, and discharge instructions  Description: Complete learning assessment and assess knowledge base    Interventions:  - Provide teaching at level of understanding  - Provide teaching via preferred learning methods  Outcome: Progressing     Problem: RESPIRATORY - ADULT  Goal: Achieves optimal ventilation and oxygenation  Description: INTERVENTIONS:  - Assess for changes in respiratory status  - Assess for changes in mentation and behavior  - Position to facilitate oxygenation and minimize respiratory effort  - Oxygen administered by appropriate delivery if ordered  - Initiate smoking cessation education as indicated  - Encourage broncho-pulmonary hygiene including cough, deep breathe, Incentive Spirometry  - Assess the need for suctioning and aspirate as needed  - Assess and instruct to report SOB or any respiratory difficulty  - Respiratory Therapy support as indicated  Outcome: Progressing

## 2021-09-22 NOTE — ASSESSMENT & PLAN NOTE
Lab Results   Component Value Date    HGBA1C 6 6 (H) 04/01/2020       No results for input(s): POCGLU in the last 72 hours      Blood Sugar Average: Last 72 hrs:    · Chronic; well controlled as evidenced by hemoglobin A1C of 6 6  · Continue home insulin regimen  · SSI  · Monitor blood glucose AC/HS

## 2021-09-22 NOTE — ED PROVIDER NOTES
History  Chief Complaint   Patient presents with    Pain With Breathing     Pt reports generalized pain, and pain iwth breathing  Pt of milan Mcdonnell of two lung transplants  Was instructed to come here and possibly get transferred  40 yo female with h/o CF s/p lung transplant who follows at [facility] for same who presents to ED for pleuritic L chest pain  This is an ongoing concern, has been seen and managed at [facility], plan for possible pnuemonectomy of the L side to possibly mitigate pain but this is not currently scheduled  Pt denies fever, cough, dyspnea, chest trauma, leg pain or swelling  She is compliant with continued AC with eliquis  She was sent to this ED today by [facility]  They are trying to get pt set up with Palliative care for further management of this ongoing pain  Pt has some difficulty getting to [place] and therefore it was recommended that she come here  I spoke directly with her transplant coordinator by cell phone today, she provides additional history and states pt does not need to be transferred to [facility] today and pt can/should be admitted to this hospital to see Palliative care  Prior to Admission Medications   Prescriptions Last Dose Informant Patient Reported? Taking?    ALPRAZolam (XANAX) 1 mg tablet   No No   Sig: TAKE 1 TABLET BY MOUTH TWICE A DAY   Cannabinoids (THC FREE) 20 MG/ML LIQD  Self Yes No   Sig: Take by mouth   Diclofenac Sodium (VOLTAREN) 1 %   No No   Sig: Apply 2 g topically 4 (four) times a day   HYDROcodone-acetaminophen (NORCO) 5-325 mg per tablet   No No   Sig: Take 1 tablet by mouth every 6 (six) hours as needed for painMax Daily Amount: 4 tablets   Patient not taking: Reported on 9/7/2021   Multiple Vitamin (MULTIVITAMIN) tablet  Self Yes No   Sig: Take 1 tablet by mouth daily   NON FORMULARY  Self Yes No   Sig: Discontinue supplemental oxygen   Respiratory Therapy Supplies (Nebulizer) SUKHI   Yes No   Sig: Please dispense nebulizer machine   SYRINGE-NEEDLE, DISP, 3 ML 23G X 1" 3 ML MISC   Yes No   Sig: Use twice daily to draw up acetylcysteine   Sennosides 17 2 MG TABS   Yes No   Sig: Take 17 2 mg by mouth 2 (two) times a day   acetylcysteine (MUCOMYST) 200 mg/mL nebulizer solution   Yes No   Sig: Mix with 2 mL sodium chloride 0 3% nebs & administer twice daily with albuterol via nebulizer   albuterol (5 mg/mL) 0 5 % nebulizer solution   Yes No   Sig: Inhale 2 5 mg 2 (two) times a day   albuterol (ACCUNEB) 1 25 MG/3ML nebulizer solution  Self Yes No   Sig: Take 1 ampule by nebulization every 4 (four) hours as needed for wheezing   alendronate (FOSAMAX) 70 mg tablet  Self Yes No   Sig: Take 70 mg by mouth every 7 days   alendronate (FOSAMAX) 70 mg tablet   Yes No   Sig: Take 70 mg by mouth every 7 days   apixaban (ELIQUIS) 5 mg  Self Yes No   Sig: Take 5 mg by mouth 2 (two) times a day 5 mg Bid   atovaquone (MEPRON) 750 mg/5 mL suspension  Self Yes No   Sig: Take 1,500 mg by mouth daily   colistimethate (COLY-MYCIN M) inhalation solution  Self Yes No   Sig: Take 150 mg by nebulization every 12 (twelve) hours   Patient not taking: Reported on 9/7/2021   dexlansoprazole (DEXILANT) 60 MG capsule  Self Yes No   Sig: Take 60 mg by mouth daily   dexlansoprazole (DEXILANT) 60 MG capsule   Yes No   Sig: Take 60 mg by mouth   dronabinol (MARINOL) 5 MG capsule   No No   Sig: TAKE 1 CAPSULE (5 MG TOTAL) BY MOUTH 3 (THREE) TIMES A DAY   escitalopram (LEXAPRO) 10 mg tablet  Self Yes No   Sig: Take 10 mg by mouth daily   Patient not taking: Reported on 9/7/2021   esomeprazole (NexIUM) 40 MG capsule   Yes No   Sig: Take 40 mg by mouth daily   famotidine (PEPCID) 20 mg tablet   Yes No   Sig: Take 20 mg by mouth 2 (two) times a day   ferrous sulfate 325 (65 Fe) mg tablet  Self Yes No   Sig: Take 325 mg by mouth daily with breakfast   insulin aspart (NovoLOG) 100 units/mL injection  Self Yes No   Sig: Inject under the skin 3 (three) times a day before meals   insulin aspart, w/niacinamide, (FIASP) 100 Units/mL injection pen   Yes No   Sig: Inject 4 unites with breakfast, 5 unites with lunch & dinner   LOW SLIDING SCALE correction with meals & bedtime   magnesium oxide (MAG-OX) 400 mg  Self Yes No   Sig: Take 400 mg by mouth 2 (two) times a day   naloxone (NARCAN) 4 mg/0 1 mL nasal spray   Yes No   Si mg into each nostril   ondansetron (ZOFRAN-ODT) 4 mg disintegrating tablet  Self No No   Sig: Take 1 tablet (4 mg total) by mouth every 6 (six) hours as needed for nausea or vomiting   oxyCODONE (ROXICODONE) 10 MG TABS   Yes No   Sig: Take 10 mg by mouth   pancrelipase, Lip-Prot-Amyl, (CREON) 24,000 units  Self Yes No   Sig: Take 48,000 Units by mouth 3 (three) times a day with meals   pancrelipase, Lip-Prot-Amyl, (CREON) 24,000 units   Yes No   Sig: Take 2 capsules by mouth   polyethylene glycol (GLYCOLAX) 17 GM/SCOOP powder   Yes No   Sig: Take 17 g by mouth daily   polyethylene glycol (MIRALAX) 17 g packet  Self Yes No   Sig: Take 17 g by mouth 2 (two) times a day as needed   predniSONE 10 mg tablet   Yes No   Sig: Take 10 mg by mouth daily   predniSONE 20 mg tablet   No No   Sig: TAKE 1 TAB 3X/DAY X 3 DAYS, 1 TAB TWICE DAILY X 3 DAYS, 1 TAB DAILY X 3 DAYS   pregabalin (LYRICA) 25 mg capsule   Yes No   Sig: Take 25 mg by mouth Three times a day   simethicone (MYLICON) 80 mg chewable tablet   Yes No   Sig: Chew 80 mg   Patient not taking: Reported on 2021   sodium chloride 3 % inhalation solution   Yes No   Sig: Inhale 4 mL 2 (two) times a day   tacrolimus (PROGRAF) 1 mg capsule   Yes No   Sig: Take 3 mg by mouth 2 (two) times a day   tacrolimus (PROGRAF) 5 mg capsule  Self Yes No   Sig: Take 2 5 mg by mouth daily with breakfast   Patient not taking: Reported on 2021   tacrolimus (PROGRAF) 5 mg capsule  Self Yes No   Sig: Take 3 mg by mouth daily with dinner   Patient not taking: Reported on 2021   traZODone (DESYREL) 50 mg tablet  Self Yes No   Sig: Take 25 mg by mouth valACYclovir (VALTREX) 500 mg tablet   Yes No   Sig: Take 500 mg by mouth daily      Facility-Administered Medications: None       Past Medical History:   Diagnosis Date    ABPA [facility]all[facility] bronchopulmonary aspergillosis) ([facility] ) 7/8/2012    Acute kidney injury ([facility] ) 9/8/2017    Acute on chronic respiratory failure ([facility] ) 3/4/2017    Last Assessment & Plan:  Formatting of this note might be different from the original  Recurrent with clinical worsening with enlarging PTX, s/p multiple chest tubes, but new fevers 6/19 and worsening leukocytosis indicating infection as cause of 6/19 decompensation with profound hypoxia in setting of shunting through right lung and continued pneumothorax s/p 2nd chest tube placement  6/27: Right    Asthma     Chronic pain     Cystic fibrosis ([facility] )     Diabetes mellitus ([facility] )     GERD (gastroesophageal reflux disease)     HBP (high blood pressure)     HCAP (healthcare-associated pneumonia) 3/6/2020    Influenza B 2/23/2020    Kidney stone     Neutropenic fever ([facility] ) 9/26/2020    PONV (postoperative nausea and vomiting)     Pseudomonas aeruginosa infection 9/20/2012    Transplant recipient     Lung- 2013, 2017       Past Surgical History:   Procedure Laterality Date    APPENDECTOMY      BREAST SURGERY      Aug      BRONCHOSCOPY      CHOLECYSTECTOMY      COLONOSCOPY      EGD  07/2019    Yeast Infect       FL RETROGRADE PYELOGRAM  1/9/2020    FL RETROGRADE PYELOGRAM  2/4/2020    GASTROSTOMY TUBE, PLACE      LUNG SURGERY      LUNG TRANSPLANT      X2    RI CYSTO/URETERO W/LITHOTRIPSY &INDWELL STENT INSRT Right 2/4/2020    Procedure: CYSTOSCOPY URETEROSCOPY WITH LITHOTRIPSY HOLMIUM LASER, RETROGRADE PYELOGRAM AND INSERTION STENT URETERAL;  Surgeon: Phil Heard MD;  Location: [facility] OR;  Service: Urology    RI CYSTOURETHROSCOPY,URETER CATHETER Right 1/9/2020    Procedure: CYSTOSCOPY RETROGRADE PYELOGRAM WITH INSERTION STENT URETERAL;  Surgeon: Mich Wood Crystal Garza MD;  Location[facility] OR;  Service: Urology       Family History   Problem Relation Age of Onset    Alcohol abuse Mother     Mental illness Mother     Alcohol abuse Father     COPD Maternal Grandmother     COPD Maternal Grandfather     COPD Paternal Grandmother     Breast cancer Neg Hx     Ovarian cancer Neg Hx     Colon cancer Neg Hx      I have reviewed and agree with the history as documented  E-Cigarette/Vaping    E-Cigarette Use Never User      E-Cigarette/Vaping Substances     Social History     Tobacco Use    Smoking status: Former Smoker     Packs/day: 0 50     Years: 12 00     Pack years: 6 00    Smokeless tobacco: Never Used   Vaping Use    Vaping Use: Never used   Substance Use Topics    Alcohol use: Yes     Comment: social    Drug use: Yes     Frequency: 2 0 times per week     Types: Marijuana     Comment: medical edible marijuana prescribed       Review of Systems   Constitutional: Negative for chills and fever  Respiratory: Negative for cough, chest tightness and shortness of breath  Cardiovascular: Positive for chest pain  All other systems reviewed and are negative  Physical Exam  Physical Exam  Vitals and nursing note reviewed  Constitutional:       General: She is not in acute distress  Appearance: She is well-developed  She is not ill-appearing, toxic-appearing or diaphoretic  HENT:      Head: Normocephalic and atraumatic  Nose: Nose normal  No congestion  Eyes:      Conjunctiva/sclera: Conjunctivae normal       Pupils: Pupils are equal, round, and reactive to light  Neck:      Vascular: No carotid bruit or JVD  Cardiovascular:      Rate and Rhythm: Normal rate and regular rhythm  Pulses: Normal pulses  Heart sounds: Normal heart sounds  No murmur heard  No friction rub  No gallop  Pulmonary:      Effort: Pulmonary effort is normal  No respiratory distress  Breath sounds: Normal breath sounds  No stridor   No wheezing, rhonchi or rales  Chest:      Chest wall: No tenderness  Abdominal:      General: There is no distension  Palpations: Abdomen is soft  Tenderness: There is no abdominal tenderness  There is no guarding or rebound  Musculoskeletal:         General: No swelling, tenderness, deformity or signs of injury  Normal range of motion  Cervical back: Normal range of motion and neck supple  No rigidity or tenderness  Right lower leg: No edema  Left lower leg: No edema  Lymphadenopathy:      Cervical: No cervical adenopathy  Skin:     General: Skin is warm and dry  Capillary Refill: Capillary refill takes less than 2 seconds  Coloration: Skin is not pale  Findings: No erythema or rash  Neurological:      General: No focal deficit present  Mental Status: She is alert and oriented to person, place, and time  Cranial Nerves: No cranial nerve deficit  Sensory: No sensory deficit  Motor: No weakness or abnormal muscle tone        Coordination: Coordination normal          Vital Signs  ED Triage Vitals   Temperature Pulse Respirations Blood Pressure SpO2   09/21/21 1425 09/21/21 1425 09/21/21 1425 09/21/21 1425 09/21/21 1425   98 °F (36 7 °C) 97 (!) 24 (!) 184/93 90 %      Temp Source Heart Rate Source Patient Position - Orthostatic VS BP Location FiO2 (%)   09/21/21 1425 09/21/21 1425 09/21/21 1425 09/21/21 1425 --   Oral Monitor Sitting Left arm       Pain Score       09/21/21 1622       9           Vitals:    09/21/21 1425 09/21/21 1626 09/21/21 2000   BP: (!) 184/93 157/92 148/82   Pulse: 97 85 85   Patient Position - Orthostatic VS: Sitting Sitting Lying         Visual Acuity      ED Medications  Medications   acetaminophen (TYLENOL) tablet 650 mg (has no administration in time range)   oxyCODONE (ROXICODONE) immediate release tablet 10 mg (10 mg Oral Given 9/21/21 2011)   oxyCODONE (ROXICODONE) IR tablet 5 mg (has no administration in time range) HYDROmorphone (DILAUDID) injection 0 5 mg (has no administration in time range)   ondansetron (ZOFRAN) injection 4 mg (has no administration in time range)   fentanyl citrate (PF) 100 MCG/2ML 50 mcg (50 mcg Intravenous Given 9/21/21 1622)   ipratropium-albuterol (DUO-NEB) 0 5-2 5 mg/3 mL inhalation solution 3 mL (3 mL Nebulization Given 9/21/21 1625)   HYDROmorphone (DILAUDID) injection 1 mg (1 mg Intravenous Given 9/21/21 1800)       Diagnostic Studies  Results Reviewed     Procedure Component Value Units Date/Time    Protime-INR [[id number]]  (Normal) Collected: 09/21/21 1613    Lab Status: Final result Specimen: Blood from Central Venous Line Updated: 09/21/21 1713     Protime 14 3 seconds      INR 1 09    APTT [[id number]]  (Normal) Collected: 09/21/21 1613    Lab Status: Final result Specimen: Blood from Central Venous Line Updated: 09/21/21 1713     PTT 36 seconds     Novel Coronavirus (Covid-19),PCR [facility] - 2 Hour Stat [[id number]]  (Normal) Collected: 09/21/21 1559    Lab Status: Final result Specimen: Nares from Nose Updated: 09/21/21 1655     SARS-CoV-2 Negative    Narrative: The specimen collection materials, transport medium, and/or testing methodology utilized in the production of these test results have been proven to be reliable in a limited validation with an abbreviated program under the Emergency Utilization Authorization provided by the FDA  Testing reported as "Presumptive positive" will be confirmed with secondary testing to ensure result accuracy  Clinical caution and judgement should be used with the interpretation of these results with consideration of the clinical impression and other laboratory testing  Testing reported as "Positive" or "Negative" has been proven to be accurate according to standard laboratory validation requirements  All testing is performed with control materials showing appropriate reactivity at standard intervals        Lactic acid [[id number]]  (Normal) Collected: 09/21/21 1613    Lab Status: Final result Specimen: Blood from Central Venous Line Updated: 09/21/21 1653     LACTIC ACID 0 9 mmol/L     Narrative:      Result may be elevated if tourniquet was used during collection      NT-BNP PRO [[id number]]  (Abnormal) Collected: 09/21/21 1613    Lab Status: Final result Specimen: Blood from Central Venous Line Updated: 09/21/21 1648     NT-proBNP 168 pg/mL     Hepatic function panel [[id number]]  (Normal) Collected: 09/21/21 1613    Lab Status: Final result Specimen: Blood from Central Venous Line Updated: 09/21/21 1648     Total Bilirubin 0 38 mg/dL      Bilirubin, Direct 0 13 mg/dL      Alkaline Phosphatase 76 U/L      AST 22 U/L      ALT 35 U/L      Total Protein 7 8 g/dL      Albumin 3 6 g/dL     Troponin I [[id number]]  (Normal) Collected: 09/21/21 1613    Lab Status: Final result Specimen: Blood from Central Venous Line Updated: 09/21/21 1642     Troponin I <0 02 ng/mL     Basic metabolic panel [[id number]]  (Abnormal) Collected: 09/21/21 1613    Lab Status: Final result Specimen: Blood from Central Venous Line Updated: 09/21/21 1641     Sodium 138 mmol/L      Potassium 4 3 mmol/L      Chloride 102 mmol/L      CO2 27 mmol/L      ANION GAP 9 mmol/L      BUN 22 mg/dL      Creatinine 1 18 mg/dL      Glucose 212 mg/dL      Calcium 8 7 mg/dL      eGFR 57 ml/min/1 73sq m     Narrative:      [place] guidelines for Chronic Kidney Disease (CKD):     Stage 1 with normal or high GFR (GFR > 90 mL/min/1 73 square meters)    Stage 2 Mild CKD (GFR = 60-89 mL/min/1 73 square meters)    Stage 3A Moderate CKD (GFR = 45-59 mL/min/1 73 square meters)    Stage 3B Moderate CKD (GFR = 30-44 mL/min/1 73 square meters)    Stage 4 Severe CKD (GFR = 15-29 mL/min/1 73 square meters)    Stage 5 End Stage CKD (GFR <15 mL/min/1 73 square meters)  Note: GFR calculation is accurate only with a steady state creatinine    CBC and differential [[id number]]  (Abnormal) Collected: 09/21/21 1613    Lab Status: Final result Specimen: Blood from Central Venous Line Updated: 09/21/21 1622     WBC 9 57 Thousand/uL      RBC 4 22 Million/uL      Hemoglobin 11 9 g/dL      Hematocrit 37 4 %      MCV 89 fL      MCH 28 2 pg      MCHC 31 8 g/dL      RDW 15 2 %      MPV 9 7 fL      Platelets 085 Thousands/uL      nRBC 0 /100 WBCs      Neutrophils Relative 75 %      Immat GRANS % 1 %      Lymphocytes Relative 18 %      Monocytes Relative 5 %      Eosinophils Relative 1 %      Basophils Relative 0 %      Neutrophils Absolute 7 22 Thousands/µL      Immature Grans Absolute 0 06 Thousand/uL      Lymphocytes Absolute 1 71 Thousands/µL      Monocytes Absolute 0 45 Thousand/µL      Eosinophils Absolute 0 11 Thousand/µL      Basophils Absolute 0 02 Thousands/µL                  XR chest 1 view portable   Final Result by Patricia Cardona MD (09/21 1618)      Volume loss on the right side unchanged  Stable chronic right lung base opacities  No pneumothorax                    Workstation performed: [id number]                    Procedures  Procedures         ED Course                               LOS Risk Score      Most Recent Value   Age >= 72  0 Filed at: 09/21/2021 1758   Known CAD (stenosis >= 50%)  0 Filed at: 09/21/2021 1758   Recent (<=24 hrs) Service Angina  0 Filed at: 09/21/2021 1758   ST Deviation >= 0 5 mm  0 Filed at: 09/21/2021 1758   3+ CAD Risk Factors (FHx, HTN, HLP, DM, Smoker)  0 Filed at: 09/21/2021 1758   Aspirin Use Past 7 Days  0 Filed at: 09/21/2021 1758   Elevated Cardiac Markers  0 Filed at: 09/21/2021 1758   LOS Risk Score (Calculated)  0 Filed at: 09/21/2021 1758                  MDM    Disposition  Final diagnoses:   Intractable pain   Lung transplant recipient [facility])   CF (cystic fibrosis) ([facility] )     Time reflects when diagnosis was documented in both MDM as applicable and the Disposition within this note     Time User Action Codes Description Comment    9/21/2021  5:38 PM Calvin Other Add [R52] Intractable pain     9/21/2021  5:38 PM Calvin Other Add [Z94 2] Lung transplant recipient [facility])     9/21/2021  5:38 PM Maranda Johns Add [E84 9] CF (cystic fibrosis) [facility])       ED Disposition     ED Disposition Condition Date/Time Comment    Admit Stable Tue Sep 21, 2021  5:38 PM Case was discussed with Dr Joseph Stewart and the patient's admission status was agreed to be Admission Status: observation status to the service of Dr Joseph Stewart   Follow-up Information    None         Patient's Medications   Discharge Prescriptions    No medications on file     No discharge procedures on file      PDMP Review     None          ED Provider  Electronically Signed by           Mauro Verde MD  09/21/21 [id number]

## 2021-09-22 NOTE — UTILIZATION REVIEW
Initial Clinical Review    OBS order 9/21 1738 converted to IP on 9/22 @ 1843 for continued IV analgesic pain mngt     Level of Care Med Surg    Estimated length of stay More than 2 Midnights    Certification I certify that inpatient services are medically necessary for this patient for a duration of greater than two midnights  See H&P and MD Progress Notes for additional information about the patient's course of treatment  09/22/21 1843  Inpatient Admission Once      09/22/21 1846   09/21/21 1739  Place in Observation (ED Bridging Orders Panel) Once      09/21/21 1738       ED Arrival Information     Expected Arrival Acuity    - 9/21/2021 14:12 Emergent         Means of arrival Escorted by Service Admission type    [facility] Emergency         Arrival complaint    Flank Pain(Immunocompromised)        Chief Complaint   Patient presents with    Pain With Breathing     Pt reports generalized pain, and pain iwth breathing  Pt of Sathya, hx of two lung transplants  Was instructed to come here and possibly get transferred  Initial Presentation: 40 yo female to ED from home w/ CP worse w/ movement and palpitation  Hx lung transplant in 2013  Admitted OBS status for pain control   Cont w/ suppressive meds post transplant   Hx GEOFF at baseline   DM SSi and monitor   GERD cont PPI   Cystic fibrosis cont creon   9/22 IM Note   Being currently worked up for possible R lung pneumonectomy d/t to the fact that her R lung is shrinking   Palliative care consult for pain mngt inc prn dilaudid for breakthrough pain   Pain feels like is bw ribs , concern for costochondritis  9/22 Palliative Care Consult   CKD sx mngt   Inc oxycodone , hydromorphone breakthrough pain , cont lyrica , start  ATC acetaminophen , start lidocaine patch   PE : facial edema , abd distention   9/23 IM Note   Pt c/o inc pain overnight   Better controlled by adjustments made by palliative care    Cont dilaudid for breakthrough pain , ATC tylenol   ED Triage Vitals   Temperature Pulse Respirations Blood Pressure SpO2   09/21/21 1425 09/21/21 1425 09/21/21 1425 09/21/21 1425 09/21/21 1425   98 °F (36 7 °C) 97 (!) 24 (!) 184/93 90 %      Temp Source Heart Rate Source Patient Position - Orthostatic VS BP Location FiO2 (%)   09/21/21 1425 09/21/21 1425 09/21/21 1425 09/21/21 1425 --   Oral Monitor Sitting Left arm       Pain Score       09/21/21 1622       9          Wt Readings from Last 1 Encounters:   09/21/21 53 2 kg (117 lb 4 6 oz)     Additional Vital Signs:   09/21/21 21:12:37  98 1 °F (36 7 °C)  86  18  157/96  116  94 %  None (Room air)  Sitting   09/21/21 2000  --  85  18  148/82  108  98 %  None (Room air)  Lying   09/21/21 1626  98 °F (36 7 °C)  85  20  157/92  119  98 %  None (Room air)  Sitting   09/21/21 1600  --  --  --  --  --  --  None (Room          Pertinent Labs/Diagnostic Test Results:   9/21 PCXR    Volume loss on the right side unchanged  Stable chronic right lung base opacities    No pneumothorax    9/21 EKG Normal sinus rhythm  Normal ECG     Results from last 7 days   Lab Units 09/21/21  1559   SARS-COV-2  Negative     Results from last 7 days   Lab Units 09/22/21  0538 09/21/21  1613   WBC Thousand/uL 6 92 9 57   HEMOGLOBIN g/dL 10 8* 11 9   HEMATOCRIT % 34 0* 37 4   PLATELETS Thousands/uL 133* 162   NEUTROS ABS Thousands/µL 3 57 7 22     Results from last 7 days   Lab Units 09/22/21  0538 09/21/21  1613   SODIUM mmol/L 139 138   POTASSIUM mmol/L 4 5 4 3   CHLORIDE mmol/L 104 102   CO2 mmol/L 27 27   ANION GAP mmol/L 8 9   BUN mg/dL 25 22   CREATININE mg/dL 1 28 1 18   EGFR ml/min/1 73sq m 51 57   CALCIUM mg/dL 8 5 8 7     Results from last 7 days   Lab Units 09/21/21  1613   AST U/L 22   ALT U/L 35   ALK PHOS U/L 76   TOTAL PROTEIN g/dL 7 8   ALBUMIN g/dL 3 6   TOTAL BILIRUBIN mg/dL 0 38   BILIRUBIN DIRECT mg/dL 0 13     Results from last 7 days   Lab Units 09/22/21  0538 09/21/21  1613   GLUCOSE RANDOM mg/dL 131 212*       Results from last 7 days   Lab Units 09/21/21  1613   TROPONIN I ng/mL <0 02     Results from last 7 days   Lab Units 09/21/21  1613   PROTIME seconds 14 3   INR  1 09   PTT seconds 36     Results from last 7 days   Lab Units 09/21/21  1613   LACTIC ACID mmol/L 0 9     Results from last 7 days   Lab Units 09/21/21  1613   NT-PRO BNP pg/mL 168*       ED Treatment:   09/23/21 07:32:02  97 2 °F (36 2 °C)Abnormal   --  18  125/74  91  --  --  --   09/23/21 0723  --  --  --  --  --  99 %  None (Room air)  --   09/22/21 21:03:19  97 9 °F (36 6 °C)  87  18  125/70  88  92 %  --  --   09/22/21 2039  --  --  --  --  --  94 %  None (Room air)  --   09/22/21 2035  --  --  --  --  --  94 %  --  --   09/22/21 15:10:06  97 5 °F (36 4 °C)  82  18  110/61  77  92 %  --  --   09/22/21 07:57:08  98 2 °F (36 8 °C)  --  16  133/83  100  --  --  --   09/22/21 0725  --  --  --  --  --  92 %  None (Room          Medication Administration from 09/21/2021 1412 to 09/21/2021 2109       Date/Time Order Dose Route Action     09/21/2021 1622 fentanyl citrate (PF) 100 MCG/2ML 50 mcg 50 mcg Intravenous Given     09/21/2021 1625 ipratropium-albuterol (DUO-NEB) 0 5-2 5 mg/3 mL inhalation solution 3 mL 3 mL Nebulization Given     09/21/2021 1800 HYDROmorphone (DILAUDID) injection 1 mg 1 mg Intravenous Given     09/21/2021 2011 oxyCODONE (ROXICODONE) immediate release tablet 10 mg 10 mg Oral Given        Past Medical History:   Diagnosis Date    ABPA (allergic bronchopulmonary aspergillosis) ([facility] ) 7/8/2012    Acute kidney injury ([address] ) 9/8/2017    Acute on chronic respiratory failure ([facility] ) 3/4/2017    Last Assessment & Plan:  Formatting of this note might be different from the original  Recurrent with clinical worsening with enlarging PTX, s/p multiple chest tubes, but new fevers 6/19 and worsening leukocytosis indicating infection as cause of 6/19 decompensation with profound hypoxia in setting of shunting through right lung and continued pneumothorax s/p 2nd chest tube placement    6/27: Right    Asthma     Chronic pain     Cystic fibrosis (HCC)     Diabetes mellitus ([facility] )     GERD (gastroesophageal reflux disease)     HBP (high blood pressure)     HCAP (healthcare-associated pneumonia) 3/6/2020    Influenza B 2/23/2020    Kidney stone     Neutropenic fever ([facility] ) 9/26/2020    PONV (postoperative nausea and vomiting)     Pseudomonas aeruginosa infection 9/20/2012    Transplant recipient     Lung- 2013, 2017     Present on Admission:   Chronic kidney disease   (Resolved) Gastroesophageal reflux disease without esophagitis   Cystic fibrosis ([facility] )      Admitting Diagnosis: CF (cystic fibrosis) ([place] [address] ) [E84 9]  Intractable pain [R52]  Chest pain varying with breathing [R07 1]  Lung transplant recipient [facility]) [Z94 2]  Age/Sex: 39 y o  female  Admission Orders:  Scheduled Medications:  acetaminophen, 975 mg, Oral, Q8H [facility]  acetylcysteine, 2 mL, Nebulization, BID  albuterol, 5 mg, Nebulization, BID  ALPRAZolam, 1 mg, Oral, BID  apixaban, 5 mg, Oral, BID  atovaquone, 1,500 mg, Oral, Daily  dronabinol, 5 mg, Oral, TID  famotidine, 20 mg, Oral, BID  insulin lispro, 4 Units, Subcutaneous, TID With Meals  lidocaine, 2 patch, Topical, Daily  pancrelipase (Lip-Prot-Amyl), 48,000 Units, Oral, TID With Meals  pantoprazole, 40 mg, Oral, Early Morning  predniSONE, 10 mg, Oral, Daily  pregabalin, 75 mg, Oral, TID  tacrolimus, 3 mg, Oral, BID  traZODone, 25 mg, Oral, HS  valACYclovir, 500 mg, Oral, Daily    IV toradol x1 9/22    Continuous IV Infusions:     PRN Meds:  acetaminophen, 650 mg, Oral, Q6H PRN  Dilaudid 1 5mg IV q3hr prn   Po dilaudid 4mg q4hr prn   Po dilaudid 8 mg q3hr prn   HYDROmorphone, 0 5 mg, Intravenous, Q4H PRN  9/21  x1  9/22  x2  IV dilaudid 1 mg prn  9/22  x3  9/23  x3   ondansetron, 4 mg, Intravenous, Q6H PRN  oxyCODONE, 10 mg, Oral, Q4H PRN  oxyCODONE, 5 mg, Oral, Q4H PRN  polyethylene glycol, 17 g, Oral, BID PRN        Network Utilization Review Department  ATTENTION: Please call with any questions or concerns to [phone] and carefully listen to the prompts so that you are directed to the right person  All voicemails are confidential   Avelina Cowden all requests for admission clinical reviews, approved or denied determinations and any other requests to dedicated fax number below belonging to the campus where the patient is receiving treatment   List of dedicated fax numbers for the Facilities:  [address] DENIALS (Administrative/Medical Necessity) [phone]   [address] (Maternity/NICU/Pediatrics) [phone]   [address] [place] [address] [address] [facility] [address] [id number] [place] [address] [address] [place] [address] [phone]

## 2021-09-22 NOTE — ASSESSMENT & PLAN NOTE
Lab Results   Component Value Date    EGFR 51 09/22/2021    EGFR 57 09/21/2021    EGFR 58 09/09/2021    CREATININE 1 28 09/22/2021    CREATININE 1 18 09/21/2021    CREATININE 1 16 09/09/2021     · Chronic  · Baseline creatinine noted to be 1 0-1 2  · Creatinine on admission noted to be 1 18

## 2021-09-22 NOTE — CONSULTS
Consultation - Palliative and Supportive Care   Khushi Benjaminshan 39 y o  female [phone]    Patient Active Problem List   Diagnosis    Lung transplant status, bilateral ([facility] )    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, with long-term current use of insulin (HCC)    Abnormal CT scan, colitis    Pancreatic insufficiency    Asthma    Chronic pain disorder    DVT (deep venous thrombosis) (HCC)    Anemia    Long term current use of bisphosphonates    Long term current use of systemic steroids    Osteoporosis    Anxiety    Chronic kidney disease    Chronic sinusitis    CMV (cytomegalovirus infection) status negative    Colon polyp    Cytomegalovirus (CMV) viremia (HCC)    Esophageal reflux    Hiatal hernia    Immunosuppression (HCC)    Thrombocytopenia (HCC)    Severe protein-calorie malnutrition (HCC)    Transplant rejection    [facility]    Essential hypertension    Gastroesophageal reflux disease without esophagitis    Hypercholesteremia    Chronic respiratory failure with hypoxia (HCC)    Recurrent pneumonia    Fatigue    Chest pain     Active issues specifically addressed today include:  CKD  Cystic fibrosis   Lung transplant  CF related pain      Plan:  1  Symptom management  -  Increase Oxycodone 10 mg -15 mg Q 4 hours  prn moderate-severe pain  -  Hydromorphone 1 mg Q 4 hours prn breakthrough pain  -  Continue Lyrica 75 mg TID  -  Start ATC acetaminophen 975 Q 8 hours  -  Start Lidocaine patch to area     2  Goals   -  Disease focused cares without limits  -  Patient follows closely with [facility] transplant center who referred patient to their palliative team for ongoing pain management, she has outpatient appointment 9/28  Code Status: Full Code  - Level 1   Decisional apparatus:  Patient is competent on my exam today    If competence is lost, patient's substitute decision maker would default to [place] Act 169    Advance Directive / Living Will / POLST:  None on file      I have reviewed the patient's controlled substance dispensing history in the Prescription Drug Monitoring Program in compliance with the [facility] regulations before prescribing any controlled substances  We appreciate the invitation to be involved in this patient's care  We will continue to follow [phone] should you have acute symptom control concerns  WAYNE Gutierrez  Palliative and Supportive Care  Clinic/Answering Service: [phone]  You can find me on [employer]! IDENTIFICATION:  Inpatient consult to Palliative Care  Consult performed by: WAYNE Lau  Consult ordered by: Daisha Damon, [facility]        Physician Requesting Consult: Yony Barry MD  Reason for Consult / Principal Problem: symptom control   Hx and PE limited by: none     HISTORY OF PRESENT ILLNESS:       Maribel Rodriguez is a 39 y o  female with past medical history significant for CF with lung transplant in 2013 (b/l) and 2017( left), recurrent acute hypoxic respiratory failure, DM, GERD  who presents with increasing, uncontrolled chest pain  The patient has had recurrent pulmonary infections since 2018 requiring mechanical ventilation and ECMO  Most recently she was admitted at [facility] from 9/9-9/15 for hypoxic respiratory failure  During that admission the patient experienced ongoing pleuritic chest pain which was managed with oxycodone at discharge  The patient describes experiencing a pulling pain in her right chest   She states her right lung is deflated and she feels like it is "being pulled inward"  She also experiences left chest pain as well  She states she feels significant pain in between her ribs which she describes as severe  She feels that her left lung is growing and is pushing out in between her ribs and in to her stomach    The pain is described as pushing, pulling and severe, she denies burning or tingling pain    Prior to June, she states she did not experience pain but the pain has been getting significantly worse since then  She expresses the pain has influenced her ability to participate in a lot of activities  She was using oxycodone 10 mg PO Q 6 hours at home which was ineffective  She was using miralax and senna to prevent opioid induced constipation  Review of Systems   Constitutional: Negative for decreased appetite and weight loss  Cardiovascular: Positive for chest pain  Respiratory: Negative for cough and shortness of breath  Skin: Positive for nail changes  Negative for suspicious lesions  Gastrointestinal: Negative for constipation, nausea and vomiting  Genitourinary: Negative for bladder incontinence  All other systems reviewed and are negative  Past Medical History:   Diagnosis Date    ABPA (allergic bronchopulmonary aspergillosis) ([facility] ) 7/8/2012    Acute kidney injury ([facility] ) 9/8/2017    Acute on chronic respiratory failure ([facility] ) 3/4/2017    Last Assessment & Plan:  Formatting of this note might be different from the original  Recurrent with clinical worsening with enlarging PTX, s/p multiple chest tubes, but new fevers 6/19 and worsening leukocytosis indicating infection as cause of 6/19 decompensation with profound hypoxia in setting of shunting through right lung and continued pneumothorax s/p 2nd chest tube placement    6/27: Right    Asthma     Chronic pain     Cystic fibrosis ([facility] )     Diabetes mellitus ([address] )     GERD (gastroesophageal reflux disease)     HBP (high blood pressure)     HCAP (healthcare-associated pneumonia) 3/6/2020    Influenza B 2/23/2020    Kidney stone     Neutropenic fever ([address] ) 9/26/2020    PONV (postoperative nausea and vomiting)     Pseudomonas aeruginosa infection 9/20/2012    Transplant recipient     Lung- 2013, 2017     Past Surgical History:   Procedure Laterality Date    APPENDECTOMY      BREAST SURGERY      Aug   BRONCHOSCOPY      CHOLECYSTECTOMY      COLONOSCOPY      EGD  2019    Yeast Infect       FL RETROGRADE PYELOGRAM  2020    FL RETROGRADE PYELOGRAM  2020    GASTROSTOMY TUBE, PLACE      LUNG SURGERY      LUNG TRANSPLANT      X2    MT CYSTO/URETERO W/LITHOTRIPSY &INDWELL STENT INSRT Right 2020    Procedure: CYSTOSCOPY URETEROSCOPY WITH LITHOTRIPSY HOLMIUM LASER, RETROGRADE PYELOGRAM AND INSERTION STENT URETERAL;  Surgeon: Dillon Hughes MD;  Location: [facility];  Service: Urology    MT CYSTOURETHROSCOPY,URETER CATHETER Right 2020    Procedure: CYSTOSCOPY RETROGRADE PYELOGRAM WITH INSERTION STENT URETERAL;  Surgeon: Dillon Hughes MD;  Location: [facility];  Service: Urology     Social History     Socioeconomic History    Marital status: /Civil Union     Spouse name: Not on file    Number of children: Not on file    Years of education: 15    Highest education level: Not on file   Occupational History    Occupation: Disabled    Tobacco Use    Smoking status: Former Smoker     Packs/day: 0 50     Years: 12 00     Pack years: 6 00    Smokeless tobacco: Never Used   Vaping Use    Vaping Use: Never used   Substance and Sexual Activity    Alcohol use: Yes     Comment: social    Drug use: Yes     Frequency: 2 0 times per week     Types: Marijuana     Comment: medical edible marijuana prescribed    Sexual activity: Yes     Partners: Male     Birth control/protection: None   Other Topics Concern    Not on file   Social History Narrative    Most recent tobacco use screenin2019    Do you currently or have you served in the [employer]: No    Were you activated, into active duty, as a member of the [employer] or as a Reservist: No    Occupation: disabled    Education: 12    Marital status:     Exercise level: Occasional    Diet: Specific    low sugar, high fat    General stress level: High    Alcohol intake: Occasional    Caffeine intake: Heavy Chewing tobacco: none    Illicit drugs: none    Guns present in home: No    Seat belts used routinely: Yes    Smoke alarm in home: Yes    Advance directive: Yes     Social Determinants of Health     Financial Resource Strain:     Difficulty of Paying Living Expenses:    Food Insecurity:     Worried About Running Out of Food in the Last Year:     [address] in the Last Year:    Transportation Needs:     Lack of Transportation (Medical):      Lack of Transportation (Non-Medical):    Physical Activity:     Days of Exercise per Week:     Minutes of Exercise per Session:    Stress:     Feeling of Stress :    Social Connections:     Frequency of Communication with Friends and Family:     Frequency of Social Gatherings with Friends and Family:     Attends Orthodox Services:     Active Member of Clubs or Organizations:     Attends Club or Organization Meetings:     Marital Status:    Intimate Partner Violence:     Fear of Current or Ex-Partner:     Emotionally Abused:     Physically Abused:     Sexually Abused:      Family History   Problem Relation Age of Onset    Alcohol abuse Mother     Mental illness Mother     Alcohol abuse Father     COPD Maternal Grandmother     COPD Maternal Grandfather     COPD Paternal Grandmother     Breast cancer Neg Hx     Ovarian cancer Neg Hx     Colon cancer Neg Hx        MEDICATIONS / ALLERGIES:    current meds:   Current Facility-Administered Medications   Medication Dose Route Frequency    acetaminophen (TYLENOL) tablet 650 mg  650 mg Oral Q6H PRN    acetylcysteine (MUCOMYST) 200 mg/mL inhalation solution 400 mg  2 mL Nebulization BID    albuterol inhalation solution 5 mg  5 mg Nebulization BID    ALPRAZolam (XANAX) tablet 1 mg  1 mg Oral BID    apixaban (ELIQUIS) tablet 5 mg  5 mg Oral BID    atovaquone (MEPRON) oral suspension 1,500 mg  1,500 mg Oral Daily    dronabinol (MARINOL) capsule 5 mg  5 mg Oral TID    famotidine (PEPCID) tablet 20 mg 20 mg Oral BID    HYDROmorphone (DILAUDID) injection 1 mg  1 mg Intravenous Q4H PRN    insulin lispro (HumaLOG) 100 units/mL subcutaneous injection 4 Units  4 Units Subcutaneous TID With Meals    ketorolac (TORADOL) injection 15 mg  15 mg Intravenous Once    ondansetron (ZOFRAN) injection 4 mg  4 mg Intravenous Q6H PRN    oxyCODONE (ROXICODONE) immediate release tablet 10 mg  10 mg Oral Q4H PRN    oxyCODONE (ROXICODONE) IR tablet 5 mg  5 mg Oral Q4H PRN    pancrelipase (Lip-Prot-Amyl) (CREON) delayed release capsule 48,000 Units  48,000 Units Oral TID With Meals    pantoprazole (PROTONIX) EC tablet 40 mg  40 mg Oral Early Morning    polyethylene glycol (MIRALAX) packet 17 g  17 g Oral BID PRN    predniSONE tablet 10 mg  10 mg Oral Daily    pregabalin (LYRICA) capsule 25 mg  25 mg Oral TID    tacrolimus (PROGRAF) capsule 3 mg  3 mg Oral BID    traZODone (DESYREL) tablet 25 mg  25 mg Oral HS    valACYclovir (VALTREX) tablet 500 mg  500 mg Oral Daily       Allergies   Allergen Reactions    Vancomycin Angioedema and Hives    Ceftazidime Headache     Severe headaches after desensitization       OBJECTIVE:    Physical Exam  Physical Exam  Vitals and nursing note reviewed  Constitutional:       Appearance: She is ill-appearing  HENT:      Head: Normocephalic and atraumatic  Jaw: There is normal jaw occlusion  Mouth/Throat:      Lips: Pink  Pharynx: Oropharynx is clear  Comments: Facial edema  Eyes:      General: Lids are normal    Cardiovascular:      Rate and Rhythm: Normal rate and regular rhythm  Pulses: Normal pulses  Pulmonary:      Effort: Pulmonary effort is normal    Abdominal:      General: There is distension  Musculoskeletal:      Cervical back: Normal range of motion and neck supple  Comments: Equal normal movement of all extremities    Skin:     General: Skin is warm and dry  Comments: Flushed    Neurological:      General: No focal deficit present  Mental Status: She is alert and oriented to person, place, and time  GCS: GCS eye subscore is 4  GCS verbal subscore is 5  GCS motor subscore is 6  Psychiatric:         Attention and Perception: Attention normal          Lab Results:   CBC:   Lab Results   Component Value Date    WBC 6 92 09/22/2021    HGB 10 8 (L) 09/22/2021    HCT 34 0 (L) 09/22/2021    MCV 90 09/22/2021     (L) 09/22/2021    MCH 28 5 09/22/2021    MCHC 31 8 09/22/2021    RDW 15 3 (H) 09/22/2021    MPV 9 8 09/22/2021    NRBC 0 09/22/2021   , CMP:   Lab Results   Component Value Date    SODIUM 139 09/22/2021    K 4 5 09/22/2021     09/22/2021    CO2 27 09/22/2021    BUN 25 09/22/2021    CREATININE 1 28 09/22/2021    CALCIUM 8 5 09/22/2021    AST 22 09/21/2021    ALT 35 09/21/2021    ALKPHOS 76 09/21/2021    EGFR 51 09/22/2021   , PT/PTT:  Lab Results   Component Value Date    PTT 36 09/21/2021     Imaging Studies: chest xray:  Volume loss on the right side unchanged  Stable chronic right lung base opacities  No pneumothorax           Counseling / Coordination of Care    Total floor / unit time spent today 60+  minutes  Greater than 50% of total time was spent with the patient and / or family counseling and / or coordination of care   A description of the counseling / coordination of care: chart review, review of symptoms, modification of pain regimen including narcotics, collaboration with primary team, support given,

## 2021-09-22 NOTE — PROGRESS NOTES
[address] [facility]  Progress Note Randeen Harada Samella Jane 1975, 39 y o  female MRN: [id number]  Unit/Bed#: -Alexandra Encounter: [id number]  Primary Care Provider: No primary care provider on file  Date and time admitted to hospital: 9/21/2021  3:36 PM    * Chest pain  Assessment & Plan  · Presented with significant chest pain in the setting of cystic fibrosis; history of lung transplant x 2 in 2013, 2017  · Typically follows with [facility]  · ED discussed with transplant team and recommended admission here for pain control  · Palliative consulted for pain management recommendations appreciated  · Will place on oxy prn for moderate/severe pain  · Increased PRN diluadid to 1 mg iv Dilaudid Q4PRN for breakthrough pain; will await further recommendations from Palliative Care  · 15 mg IV Toradol once as patient reports the pain feels like it is in between her ribs; concern for ? Costochondritis  · Chest x-ray (9/21/21): Volume loss on the right side unchanged  Stable chronic right lung base opacities  No pneumothorax  Cystic fibrosis ([facility] [address] )  Assessment & Plan  · Chronic;  · C/w home creon    Chronic kidney disease  Assessment & Plan  Lab Results   Component Value Date    EGFR 51 09/22/2021    EGFR 57 09/21/2021    EGFR 58 09/09/2021    CREATININE 1 28 09/22/2021    CREATININE 1 18 09/21/2021    CREATININE 1 16 09/09/2021     · Chronic  · Baseline creatinine noted to be 1 0-1 2  · Creatinine on admission noted to be 1 18  · Monitor closely  · Did give patient 1 x dose of Toradol for pain  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, with long-term current use of insulin ([facility] [address] )  Assessment & Plan  Lab Results   Component Value Date    HGBA1C 6 6 (H) 04/01/2020       No results for input(s): POCGLU in the last 72 hours      Blood Sugar Average: Last 72 hrs:    · Chronic; well controlled as evidenced by hemoglobin A1C of 6 6  · Continue home insulin regimen  · SSI  · Monitor blood glucose AC/HS    Lung transplant status, bilateral (HCC)  Assessment & Plan  · Undergone b/l lung transplant ,   · C/w suppresive meds    Gastroesophageal reflux disease without esophagitis-resolved as of 2021  Assessment & Plan  · Chronic  · PPI therapy      VTE Pharmacologic Prophylaxis: VTE Score: 5 High Risk (Score >/= 5) - Pharmacological DVT Prophylaxis Ordered: apixaban (Eliquis)  Sequential Compression Devices Ordered  Patient Centered Rounds: I performed bedside rounds with nursing staff today  Discussions with Specialists or Other Care Team Provider: Palliative Care    Education and Discussions with Family / Patient: Patient declined call to   Time Spent for Care: 30 minutes  More than 50% of total time spent on counseling and coordination of care as described above  Current Length of Stay: 0 day(s)  Current Patient Status: Observation   Certification Statement: The patient will continue to require additional inpatient hospital stay due to pain management  Discharge Plan: Anticipate discharge in 24-48 hrs to home  Code Status: Level 1 - Full Code    Subjective:   Pt in bed in obvious distress  Pt states she has 9/10 pain in her chest and rib area  She states it is worse with any additional pressure such as deep breathing, coughing, sneezing, etc   Denies having this type of pain in the past but is currently being worked up for a possible right lung pneumonectomy due to the fact that her right lung is shrinking  Objective:     Vitals:   Temp (24hrs), Av 1 °F (36 7 °C), Min:98 °F (36 7 °C), Max:98 2 °F (36 8 °C)    Temp:  [98 °F (36 7 °C)-98 2 °F (36 8 °C)] 98 2 °F (36 8 °C)  HR:  [85-97] 86  Resp:  [16-24] 16  BP: (133-184)/(82-96) 133/83  SpO2:  [90 %-98 %] 92 %  Body mass index is 21 45 kg/m²       Input and Output Summary (last 24 hours):   No intake or output data in the 24 hours ending 21 1102    Physical Exam: Physical Exam  Constitutional:       General: She is in acute distress  HENT:      Head: Normocephalic and atraumatic  Mouth/Throat:      Mouth: Mucous membranes are moist    Eyes:      Pupils: Pupils are equal, round, and reactive to light  Cardiovascular:      Rate and Rhythm: Normal rate and regular rhythm  Pulses: Normal pulses  Heart sounds: Normal heart sounds  No murmur heard  Pulmonary:      Effort: Pulmonary effort is normal  No respiratory distress  Breath sounds: Normal breath sounds  Abdominal:      General: Bowel sounds are normal  There is no distension  Palpations: Abdomen is soft  Musculoskeletal:         General: No swelling or tenderness  Normal range of motion  Cervical back: Neck supple  Skin:     General: Skin is warm and dry  Neurological:      Mental Status: She is alert and oriented to person, place, and time     Psychiatric:         Mood and Affect: Mood normal          Behavior: Behavior normal           Additional Data:     Labs:  Results from last 7 days   Lab Units 09/22/21  0538   WBC Thousand/uL 6 92   HEMOGLOBIN g/dL 10 8*   HEMATOCRIT % 34 0*   PLATELETS Thousands/uL 133*   NEUTROS PCT % 51   LYMPHS PCT % 38   MONOS PCT % 8   EOS PCT % 2     Results from last 7 days   Lab Units 09/22/21  0538 09/21/21  1613   SODIUM mmol/L 139 138   POTASSIUM mmol/L 4 5 4 3   CHLORIDE mmol/L 104 102   CO2 mmol/L 27 27   BUN mg/dL 25 22   CREATININE mg/dL 1 28 1 18   ANION GAP mmol/L 8 9   CALCIUM mg/dL 8 5 8 7   ALBUMIN g/dL  --  3 6   TOTAL BILIRUBIN mg/dL  --  0 38   ALK PHOS U/L  --  76   ALT U/L  --  35   AST U/L  --  22   GLUCOSE RANDOM mg/dL 131 212*     Results from last 7 days   Lab Units 09/21/21  1613   INR  1 09     Results from last 7 days   Lab Units 09/22/21  0754   POC GLUCOSE mg/dl 140         Results from last 7 days   Lab Units 09/21/21  1613   LACTIC ACID mmol/L 0 9       Lines/Drains:  Invasive Devices     Central Venous Catheter Line Port A Cath Right Chest -- days          Drain            Ureteral Drain/Stent Right ureter 6 Fr  595 days                Central Line:  Goal for removal: N/A - Chronic PICC             Imaging: Reviewed radiology reports from this admission including: chest xray    Recent Cultures (last 7 days):         Last 24 Hours Medication List:   Current Facility-Administered Medications   Medication Dose Route Frequency Provider Last Rate    acetaminophen  650 mg Oral Q6H PRN Sai Serrano MD      acetylcysteine  2 mL Nebulization BID Sai Serrano MD      albuterol  5 mg Nebulization BID Sai Serrano MD      ALPRAZolam  1 mg Oral BID Sai Serrano MD      apixaban  5 mg Oral BID Sai Serrano MD      atovaquone  1,500 mg Oral Daily Sai Serrano MD      dronabinol  5 mg Oral TID Kendal Mcfarland MD      famotidine  20 mg Oral BID Sai Serrano MD      HYDROmorphone  1 mg Intravenous Q4H PRN WAYNE Quintanilla      insulin lispro  4 Units Subcutaneous TID With Meals Kendal Mcfarland MD      ketorolac  15 mg Intravenous Once WAYNE Quintanilla      ondansetron  4 mg Intravenous Q6H PRN Sai Serrano MD      oxyCODONE  10 mg Oral Q4H PRN Sai Serrano MD      oxyCODONE  5 mg Oral Q4H PRN Sai Serrano MD      pancrelipase (Lip-Prot-Amyl)  48,000 Units Oral TID With Meals Sai Serrano MD      pantoprazole  40 mg Oral Early Morning Sai Serrano MD      polyethylene glycol  17 g Oral BID PRN Sai Serrano MD      predniSONE  10 mg Oral Daily Sai Serrano MD      pregabalin  25 mg Oral TID Sai Serrano MD      tacrolimus  3 mg Oral BID Sai Serrano MD      traZODone  25 mg Oral HS Sai Serrano MD      valACYclovir  500 mg Oral Daily Kendal Mcfarland MD          Today, Patient Was Seen By: WAYNE Quintanilla    **Please Note: This note may have been constructed using a voice recognition system  **

## 2021-09-22 NOTE — PHYSICIAN ADVISOR
Current patient class: Inpatient  The patient is currently on Hospital Day: 2 at [address]      This patient was originally admitted to the hospital under observation class  After admission the patient was reevaluated and determined to require further hospitalization  The patient was then documented to require at least a 2nd midnight in the hospital  As such the patient was then expected to satisfy the 2 midnight benchmark and was therefore eligible for inpatient admission  After review of the relevant documentation, labs, vital signs and test results, the patient is appropriate for INPATIENT ADMISSION  Admission to the hospital as an inpatient is a complex decision making process which requires the practitioner to consider the patients presenting complaint, history and physical examination and all relevant testing  With this in mind, in this case, the patient was deemed appropriate for INPATIENT ADMISSION  After review of the documentation and testing available at the time of the admission I concur with this clinical determination of medical necessity  Rationale is as follows: The patient is a 39 yrs Female who presented to the ED at 9/21/2021  3:36 PM with a chief complaint of Pain With Breathing (Pt reports generalized pain, and pain iwth breathing  Pt of Sathya hx of two lung transplants  Was instructed to come here and possibly get transferred  )     Patient admitted with a report of chest pain which was made worse with movement and palpation  She has a history of cystic fibrosis and a B/L lung transplant  She continues to have severe pain and was evaluated by Palliative Care  They recommened increasing her dose of oxycodone and Dilaudid for breakthrough pain and has been receiving these medications on a regular basis  With the need for continued use of analgesics, an inpatient class would be considered appropriate for this patient        The patients vitals on arrival were   ED Triage Vitals   Temperature Pulse Respirations Blood Pressure SpO2   09/21/21 1425 09/21/21 1425 09/21/21 1425 09/21/21 1425 09/21/21 1425   98 °F (36 7 °C) 97 (!) 24 (!) 184/93 90 %      Temp Source Heart Rate Source Patient Position - Orthostatic VS BP Location FiO2 (%)   09/21/21 1425 09/21/21 1425 09/21/21 1425 09/21/21 1425 --   Oral Monitor Sitting Left arm       Pain Score       09/21/21 1622       9           Past Medical History:   Diagnosis Date    ABPA (allergic bronchopulmonary aspergillosis) ([facility] [address] ) 7/8/2012    Acute kidney injury ([facility] [address] ) 9/8/2017    Acute on chronic respiratory failure ([facility] [address] ) 3/4/2017    Last Assessment & Plan:  Formatting of this note might be different from the original  Recurrent with clinical worsening with enlarging PTX, s/p multiple chest tubes, but new fevers 6/19 and worsening leukocytosis indicating infection as cause of 6/19 decompensation with profound hypoxia in setting of shunting through right lung and continued pneumothorax s/p 2nd chest tube placement  6/27: Right    Asthma     Chronic pain     Cystic fibrosis ([facility] [address] )     Diabetes mellitus ([address] )     GERD (gastroesophageal reflux disease)     HBP (high blood pressure)     HCAP (healthcare-associated pneumonia) 3/6/2020    Influenza B 2/23/2020    Kidney stone     Neutropenic fever ([facility] [address] ) 9/26/2020    PONV (postoperative nausea and vomiting)     Pseudomonas aeruginosa infection 9/20/2012    Transplant recipient     Lung- 2013, 2017     Past Surgical History:   Procedure Laterality Date    APPENDECTOMY      BREAST SURGERY      Aug      BRONCHOSCOPY      CHOLECYSTECTOMY      COLONOSCOPY      EGD  07/2019    Yeast Infect       FL RETROGRADE PYELOGRAM  1/9/2020    FL RETROGRADE PYELOGRAM  2/4/2020    GASTROSTOMY TUBE, PLACE      LUNG SURGERY      LUNG TRANSPLANT      X2    CA CYSTO/URETERO W/LITHOTRIPSY &INDWELL STENT INSRT Right 2/4/2020    Procedure: CYSTOSCOPY URETEROSCOPY WITH LITHOTRIPSY HOLMIUM LASER, RETROGRADE PYELOGRAM AND INSERTION STENT URETERAL;  Surgeon: Mell Croft MD;  Location: [facility];  Service: Urology    OH CYSTOURETHROSCOPY,URETER CATHETER Right 1/9/2020    Procedure: CYSTOSCOPY RETROGRADE PYELOGRAM WITH INSERTION STENT URETERAL;  Surgeon: Mell Croft MD;  Location: [facility];  Service: Urology       The patient was admitted to the hospital at  6:46 PM on 9/22/21 for the following diagnosis:  CF (cystic fibrosis) ([facility] [address] ) [E84 9]  Intractable pain [R52]  Chest pain varying with breathing [R07 1]  Lung transplant recipient [facility]) [Z94 2]    Consults have been placed to:   IP CONSULT TO PALLIATIVE CARE    Vitals:    09/21/21 2112 09/22/21 0725 09/22/21 0757 09/22/21 1510   BP: 157/96  133/83 110/61   BP Location: Left arm      Pulse: 86   82   Resp: 18  16 18   Temp: 98 1 °F (36 7 °C)  98 2 °F (36 8 °C) 97 5 °F (36 4 °C)   TempSrc: Oral      SpO2: 94% 92%  92%   Weight: 53 2 kg (117 lb 4 6 oz)      Height: 5' 2" (1 575 m)          Most recent labs:    Recent Labs     09/21/21  1613 09/22/21  0538   WBC 9 57 6 92   HGB 11 9 10 8*   HCT 37 4 34 0*    133*   K 4 3 4 5   CALCIUM 8 7 8 5   BUN 22 25   CREATININE 1 18 1 28   INR 1 09  --    TROPONINI <0 02  --    AST 22  --    ALT 35  --    ALKPHOS 76  --        Scheduled Meds:  Current Facility-Administered Medications   Medication Dose Route Frequency Provider Last Rate    acetaminophen  975 mg Oral [facility] April D WAYNE Breen      acetylcysteine  2 mL Nebulization BID Sai Serrano MD      albuterol  5 mg Nebulization BID Sai Serrano MD      ALPRAZolam  1 mg Oral BID Sai Serrano MD      apixaban  5 mg Oral BID Sai Serrano MD      atovaquone  1,500 mg Oral Daily Sai Serrano MD      dronabinol  5 mg Oral TID Sai Serrano MD      famotidine  20 mg Oral BID Sai Serrano MD      HYDROmorphone  1 mg Intravenous Q4H PRN WAYNE Seay      insulin lispro  4 Units Subcutaneous TID With Meals Trinh Carolina MD      lidocaine  2 patch Topical Daily April D WAYNE Breen      ondansetron  4 mg Intravenous Q6H PRN Trinh Carolina MD      oxyCODONE  10 mg Oral Q3H PRN April D WAYNE Breen      oxyCODONE  15 mg Oral Q3H PRN April D WAYNE Breen      pancrelipase (Lip-Prot-Amyl)  48,000 Units Oral TID With Meals Trinh Carolina MD      pantoprazole  40 mg Oral Early Morning Sai Serrano MD      polyethylene glycol  17 g Oral BID PRN Trinh Carolina MD      predniSONE  10 mg Oral Daily Sai Serrano MD      pregabalin  75 mg Oral TID April D WAYNE Breen      tacrolimus  3 mg Oral BID Trinh Carolina MD      traZODone  25 mg Oral HS Sai Serrano MD      valACYclovir  500 mg Oral Daily Sai Serrano MD       Continuous Infusions:   PRN Meds:  HYDROmorphone    ondansetron    oxyCODONE    oxyCODONE    polyethylene glycol    Surgical procedures (if appropriate):

## 2021-09-22 NOTE — ASSESSMENT & PLAN NOTE
· Presented with significant chest pain in the setting of cystic fibrosis; history of lung transplant x 2 in 2013, 2017  · Typically follows with [facility]  · ED discussed with transplant team and recommended admission here for pain control  · Palliative consulted for pain management recommendations appreciated  · Will place on oxy prn for moderate/severe pain  · Increased PRN diluadid to 1 mg iv Dilaudid Q4PRN for breakthrough pain; will await further recommendations from Palliative Care  · 15 mg IV Toradol once as patient reports the pain feels like it is in between her ribs; concern for ? Costochondritis  · Chest x-ray (9/21/21): Volume loss on the right side unchanged  Stable chronic right lung base opacities  No pneumothorax

## 2021-09-22 NOTE — SOCIAL WORK
Palliative Inpatient Assessment Note    LSW appreciates the opportunity to provide patient with inpatient emotional support and guidance while they continue to receive medical attention from a Palliative provider  LSW completed an assessment of need which was completed with patient    Family dynamics:  Relationship status:   Duration of relationship:   Name of significant other:  Louanna Saint and Ages: daughter Breann Kay, 5years old  Pets: 3 dogs  Other important family information: Patient's daughter was recently adopted by patient and spouse  Daughter was placed in their home as a foster child until adoption  Living situation: Resides with spouse and daughter  Patient's primary caregiver:  Spouse, Kale Yang  Any limitations:  Environmental concerns or barriers: none reported   history: none reported  Employment history/ Source of income: 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  Disability:  Spirituality/ Shinto:   Cultural information:   Mental Health and/or Drug and Alcohol history:  Advanced Directives:      Patient's strengths, social supports, and resources: Patient reported that she is focused on self-care and meditation  Patient reported that she attempts to be positive and let go of anger  Patient reported that there was a time that she was angry over situation and now she tries to be more positive  Patient does report support of   Patient also reported that the people she works with are also a good support and they encourage her to engage in positive self care  Patient/family current level of coping:  Level of understanding: Patient well informed about her disease process  Patient reports that they are currently assessing for removal of her right lung in order preserve left lung  Patient/family concerns and areas of need: Ongoing support and pain managemet  *All questions may not be answered due to constraints    Follow-up discussions may need to occur

## 2021-09-22 NOTE — ASSESSMENT & PLAN NOTE
Lab Results   Component Value Date    HGBA1C 6 6 (H) 04/01/2020       Recent Labs     09/22/21  0754 09/22/21  1204   POCGLU 140 169*       Blood Sugar Average: Last 72 hrs:    · Chronic; well controlled as evidenced by hemoglobin A1C of 6 6  · Continue home insulin regimen  · SSI  · Monitor blood glucose AC/HS

## 2021-09-22 NOTE — ASSESSMENT & PLAN NOTE
· Presented with significant chest pain in the setting of cystic fibrosis; history of lung transplant x 2 in 2013, 2017  · Typically follows with [facility]  · ED discussed with transplant team and recommended admission here for pain control  · Palliative consulted for pain management, recommendations appreciated  · Patient taken off of Oxy IR; transitioned to PO Dilaudid today  · 4 mg Dilaudid every 3 hrs prn moderate pain  · 8 mg Dilaudid every 3 hrs prn severe pain  · 1 5 mg Dilaudid every 4 hrs prn breakthrough pain  · Continue ATC acetaminophen 975mg Q8H  · Lidocaine patch to the area  · Chest x-ray (9/21/21): Volume loss on the right side unchanged  Stable chronic right lung base opacities    No pneumothorax  · Pt has appointment with outpatient palliative care through [facility] 9/28

## 2021-09-23 LAB
GLUCOSE SERPL-MCNC: 171 MG/DL (ref 65–140)
GLUCOSE SERPL-MCNC: 240 MG/DL (ref 65–140)
GLUCOSE SERPL-MCNC: 250 MG/DL (ref 65–140)

## 2021-09-23 PROCEDURE — 99232 SBSQ HOSP IP/OBS MODERATE 35: CPT | Performed by: NURSE PRACTITIONER

## 2021-09-23 PROCEDURE — 94640 AIRWAY INHALATION TREATMENT: CPT

## 2021-09-23 PROCEDURE — 94760 N-INVAS EAR/PLS OXIMETRY 1: CPT

## 2021-09-23 PROCEDURE — 82948 REAGENT STRIP/BLOOD GLUCOSE: CPT

## 2021-09-23 RX ORDER — HYDROMORPHONE HYDROCHLORIDE 4 MG/1
8 TABLET ORAL
Status: DISCONTINUED | OUTPATIENT
Start: 2021-09-23 | End: 2021-09-24

## 2021-09-23 RX ORDER — HYDROMORPHONE HCL 110MG/55ML
1.5 PATIENT CONTROLLED ANALGESIA SYRINGE INTRAVENOUS
Status: DISCONTINUED | OUTPATIENT
Start: 2021-09-23 | End: 2021-09-24

## 2021-09-23 RX ORDER — HYDROMORPHONE HYDROCHLORIDE 4 MG/1
4 TABLET ORAL
Status: DISCONTINUED | OUTPATIENT
Start: 2021-09-23 | End: 2021-09-24

## 2021-09-23 RX ORDER — HYDROMORPHONE HCL 110MG/55ML
1.5 PATIENT CONTROLLED ANALGESIA SYRINGE INTRAVENOUS EVERY 4 HOURS PRN
Status: DISCONTINUED | OUTPATIENT
Start: 2021-09-23 | End: 2021-09-23

## 2021-09-23 RX ADMIN — ATOVAQUONE 1500 MG: 750 SUSPENSION ORAL at 08:36

## 2021-09-23 RX ADMIN — HYDROMORPHONE HYDROCHLORIDE 1 MG: 1 INJECTION, SOLUTION INTRAMUSCULAR; INTRAVENOUS; SUBCUTANEOUS at 05:59

## 2021-09-23 RX ADMIN — FAMOTIDINE 20 MG: 20 TABLET ORAL at 08:40

## 2021-09-23 RX ADMIN — HYDROMORPHONE HYDROCHLORIDE 8 MG: 4 TABLET ORAL at 20:28

## 2021-09-23 RX ADMIN — PREGABALIN 75 MG: 75 CAPSULE ORAL at 08:40

## 2021-09-23 RX ADMIN — ALPRAZOLAM 1 MG: 0.5 TABLET ORAL at 08:39

## 2021-09-23 RX ADMIN — DRONABINOL 5 MG: 2.5 CAPSULE ORAL at 20:29

## 2021-09-23 RX ADMIN — APIXABAN 5 MG: 5 TABLET, FILM COATED ORAL at 08:40

## 2021-09-23 RX ADMIN — HYDROMORPHONE HYDROCHLORIDE 8 MG: 4 TABLET ORAL at 12:54

## 2021-09-23 RX ADMIN — TACROLIMUS 3 MG: 1 CAPSULE ORAL at 17:21

## 2021-09-23 RX ADMIN — HYDROMORPHONE HYDROCHLORIDE 1 MG: 1 INJECTION, SOLUTION INTRAMUSCULAR; INTRAVENOUS; SUBCUTANEOUS at 10:07

## 2021-09-23 RX ADMIN — FAMOTIDINE 20 MG: 20 TABLET ORAL at 17:20

## 2021-09-23 RX ADMIN — APIXABAN 5 MG: 5 TABLET, FILM COATED ORAL at 17:20

## 2021-09-23 RX ADMIN — PANCRELIPASE 48000 UNITS: 24000; 76000; 120000 CAPSULE, DELAYED RELEASE PELLETS ORAL at 12:50

## 2021-09-23 RX ADMIN — TACROLIMUS 3 MG: 1 CAPSULE ORAL at 08:40

## 2021-09-23 RX ADMIN — DRONABINOL 5 MG: 2.5 CAPSULE ORAL at 16:48

## 2021-09-23 RX ADMIN — INSULIN LISPRO 4 UNITS: 100 INJECTION, SOLUTION INTRAVENOUS; SUBCUTANEOUS at 12:50

## 2021-09-23 RX ADMIN — OXYCODONE HYDROCHLORIDE 15 MG: 10 TABLET ORAL at 01:06

## 2021-09-23 RX ADMIN — ACETAMINOPHEN 975 MG: 325 TABLET, FILM COATED ORAL at 22:11

## 2021-09-23 RX ADMIN — ACETYLCYSTEINE 400 MG: 200 SOLUTION ORAL; RESPIRATORY (INHALATION) at 07:20

## 2021-09-23 RX ADMIN — INSULIN LISPRO 4 UNITS: 100 INJECTION, SOLUTION INTRAVENOUS; SUBCUTANEOUS at 18:49

## 2021-09-23 RX ADMIN — ALBUTEROL SULFATE 5 MG: 2.5 SOLUTION RESPIRATORY (INHALATION) at 07:20

## 2021-09-23 RX ADMIN — PREDNISONE 10 MG: 10 TABLET ORAL at 08:40

## 2021-09-23 RX ADMIN — DRONABINOL 5 MG: 2.5 CAPSULE ORAL at 08:40

## 2021-09-23 RX ADMIN — HYDROMORPHONE HYDROCHLORIDE 1.5 MG: 2 INJECTION, SOLUTION INTRAMUSCULAR; INTRAVENOUS; SUBCUTANEOUS at 18:46

## 2021-09-23 RX ADMIN — ACETAMINOPHEN 975 MG: 325 TABLET, FILM COATED ORAL at 14:18

## 2021-09-23 RX ADMIN — HYDROMORPHONE HYDROCHLORIDE 8 MG: 4 TABLET ORAL at 17:19

## 2021-09-23 RX ADMIN — HYDROMORPHONE HYDROCHLORIDE 1 MG: 1 INJECTION, SOLUTION INTRAMUSCULAR; INTRAVENOUS; SUBCUTANEOUS at 01:19

## 2021-09-23 RX ADMIN — ALPRAZOLAM 1 MG: 0.5 TABLET ORAL at 22:24

## 2021-09-23 RX ADMIN — PANTOPRAZOLE SODIUM 40 MG: 40 TABLET, DELAYED RELEASE ORAL at 05:58

## 2021-09-23 RX ADMIN — ACETAMINOPHEN 975 MG: 325 TABLET, FILM COATED ORAL at 05:58

## 2021-09-23 RX ADMIN — PANCRELIPASE 48000 UNITS: 24000; 76000; 120000 CAPSULE, DELAYED RELEASE PELLETS ORAL at 16:51

## 2021-09-23 RX ADMIN — VALACYCLOVIR HYDROCHLORIDE 500 MG: 500 TABLET, FILM COATED ORAL at 08:38

## 2021-09-23 RX ADMIN — INSULIN LISPRO 4 UNITS: 100 INJECTION, SOLUTION INTRAVENOUS; SUBCUTANEOUS at 08:43

## 2021-09-23 RX ADMIN — ALBUTEROL SULFATE 5 MG: 2.5 SOLUTION RESPIRATORY (INHALATION) at 19:52

## 2021-09-23 RX ADMIN — HYDROMORPHONE HYDROCHLORIDE 1.5 MG: 2 INJECTION, SOLUTION INTRAMUSCULAR; INTRAVENOUS; SUBCUTANEOUS at 22:14

## 2021-09-23 RX ADMIN — ACETYLCYSTEINE 400 MG: 200 SOLUTION ORAL; RESPIRATORY (INHALATION) at 19:54

## 2021-09-23 RX ADMIN — OXYCODONE HYDROCHLORIDE 15 MG: 10 TABLET ORAL at 10:02

## 2021-09-23 RX ADMIN — HYDROMORPHONE HYDROCHLORIDE 1.5 MG: 2 INJECTION, SOLUTION INTRAMUSCULAR; INTRAVENOUS; SUBCUTANEOUS at 14:17

## 2021-09-23 RX ADMIN — POLYETHYLENE GLYCOL 3350 17 G: 17 POWDER, FOR SOLUTION ORAL at 14:22

## 2021-09-23 RX ADMIN — OXYCODONE HYDROCHLORIDE 15 MG: 10 TABLET ORAL at 05:58

## 2021-09-23 RX ADMIN — TRAZODONE HYDROCHLORIDE 25 MG: 50 TABLET ORAL at 22:11

## 2021-09-23 RX ADMIN — PREGABALIN 75 MG: 75 CAPSULE ORAL at 20:29

## 2021-09-23 RX ADMIN — PREGABALIN 75 MG: 75 CAPSULE ORAL at 16:49

## 2021-09-23 RX ADMIN — PANCRELIPASE 48000 UNITS: 24000; 76000; 120000 CAPSULE, DELAYED RELEASE PELLETS ORAL at 08:37

## 2021-09-23 NOTE — PROGRESS NOTES
Progress note - Palliative and Supportive Care   Matthias Fortune 39 y o  female [id number]    Patient Active Problem List   Diagnosis    [facility] status, [facility] ([facility] )    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, with long-term current use of insulin (HCC)    Abnormal CT scan, colitis    Pancreatic insufficiency    Asthma    Chronic pain disorder    DVT (deep venous thrombosis) (HCC)    Anemia    Long term current use of bisphosphonates    Long term current use of systemic steroids    Osteoporosis    Anxiety    Chronic kidney disease    Chronic sinusitis    CMV (cytomegalovirus infection) status negative    Colon polyp    Cytomegalovirus (CMV) viremia (HCC)    Esophageal reflux    Hiatal hernia    Immunosuppression (HCC)    Thrombocytopenia (HCC)    Severe protein-calorie malnutrition ([facility] )    Transplant rejection    Cyst[facility] fibrosis ([facility] )    Essential hypertension    Hypercholesteremia    Chronic respiratory failure with hypoxia (HCC)    Recurrent pneumonia    Fatigue    Chest pain     Active issues specifically addressed today include:   - cystic fibrosis   - s/p bilateral lung transplant, 2013   - s/p L lung transplant, 2017   - CKD   - chronic pain   - palliative care encounter    Plan:  #symptom management  #chronic pain   - continue APAP 975 mg PO Q8H [address]   - max daily 4000 mg   - opioid rotation to hydromorphone 4 mg PO Q3H PRN [mod pain] or 8 mg PO Q3H PRN [severe pain]   - discontinue oxy-IR   - increase hydromorphone 1 5 mg IV Q3H PRN [breakthrough]   - OME usage yesterday: 212 5 mg   - continue prednisone 10 mg PO QDaily   - continue pregabalin 75 mg PO TID   - continue lidocaine patch x 2 Q12H on/Q12H off    #anxiety   - continue alprazolam 1 mg PO BID    #insomnia   - continue trazodone 25 mg PO QHS    #appetite stimulant   - continue dronabinol 5 mg PO TID    #OIC   - continue miralax BID PRN to prevent OIC    #nausea   - continue ondansetron 4 mg IV Q6H PRN    #goals of care   - addressed symptoms management during today's encounter    #psychosocial support   - emotional support provided   - Karissa Leonardo [phone]      We appreciate the opportunity to participate in this patient's care  We will continue to follow  Please do not hesitate to contact our on-call provider through our clinic answering service at [phone] should you have acute symptom control concerns  Code Status: Full Code - Level 1   Decisional apparatus:  Patient is competent on my exam today  If competence is lost, patient's substitute decision maker would default to spouse by [place]     Advance Directive / Living Will / POLST:  Not on File, not previously completed    Interval history:   - NAEO   - worsening pain, circumferential around lower chest, constant, minimal relief with oxy-IR, notes that oxy-IR in conjunction with IV hydrmorphone more effective, lasts 1-2 hours, attributes pain relief to IV hydrmorphone    - denies nausea, vomiting   - last BM yesterday   - tolerating PO intake without difficulty   - variable sleep in the setting of pain, hospitalization    MEDICATIONS / ALLERGIES:     current meds:   Current Facility-Administered Medications   Medication Dose Route Frequency    acetaminophen (TYLENOL) tablet 975 mg  975 mg Oral Q8H [facility]    acetylcysteine (MUCOMYST) 200 mg/mL inhalation solution 400 mg  2 mL Nebulization BID    albuterol inhalation solution 5 mg  5 mg Nebulization BID    ALPRAZolam (XANAX) tablet 1 mg  1 mg Oral BID    apixaban (ELIQUIS) tablet 5 mg  5 mg Oral BID    atovaquone (MEPRON) oral suspension 1,500 mg  1,500 mg Oral Daily    dronabinol (MARINOL) capsule 5 mg  5 mg Oral TID    famotidine (PEPCID) tablet 20 mg  20 mg Oral BID    HYDROmorphone (DILAUDID) injection 1 mg  1 mg Intravenous Q4H PRN    insulin lispro (HumaLOG) 100 units/mL subcutaneous injection 4 Units  4 Units Subcutaneous TID With Meals  lidocaine (LIDODERM) 5 % patch 2 patch  2 patch Topical Daily    ondansetron (ZOFRAN) injection 4 mg  4 mg Intravenous Q6H PRN    oxyCODONE (ROXICODONE) immediate release tablet 10 mg  10 mg Oral Q3H PRN    oxyCODONE (ROXICODONE) IR tablet 15 mg  15 mg Oral Q3H PRN    pancrelipase (Lip-Prot-Amyl) (CREON) delayed release capsule 48,000 Units  48,000 Units Oral TID With Meals    pantoprazole (PROTONIX) EC tablet 40 mg  40 mg Oral Early Morning    polyethylene glycol (MIRALAX) packet 17 g  17 g Oral BID PRN    predniSONE tablet 10 mg  10 mg Oral Daily    pregabalin (LYRICA) capsule 75 mg  75 mg Oral TID    tacrolimus (PROGRAF) capsule 3 mg  3 mg Oral BID    traZODone (DESYREL) tablet 25 mg  25 mg Oral HS    valACYclovir (VALTREX) tablet 500 mg  500 mg Oral Daily       Allergies   Allergen Reactions    Vancomycin Angioedema and Hives    Ceftazidime Headache     Severe headaches after desensitization       OBJECTIVE:    Physical Exam  Physical Exam  Vitals and nursing note reviewed  Constitutional:       General: She is awake  Appearance: She is not diaphoretic  Comments: Chronically ill appearing in NAD  Non-toxic appearing   HENT:      Head: Normocephalic and atraumatic  Right Ear: External ear normal       Left Ear: External ear normal       Nose: No rhinorrhea  Eyes:      Comments: No gaze preference   Pulmonary:      Effort: No tachypnea, accessory muscle usage or respiratory distress  Comments: Completes full sentences without difficulty  Musculoskeletal:      Cervical back: Normal range of motion  Neurological:      General: No focal deficit present  Mental Status: She is alert and oriented to person, place, and time     Psychiatric:         Attention and Perception: Attention normal          Mood and Affect: Mood and affect normal          Speech: Speech normal          Cognition and Memory: Cognition and memory normal          Lab Results: I have personally reviewed pertinent labs  , CBC: No results found for: WBC, HGB, HCT, MCV, PLT, ADJUSTEDWBC, MCH, MCHC, RDW, MPV, NRBC, CMP: No results found for: SODIUM, K, CL, CO2, ANIONGAP, BUN, CREATININE, GLUCOSE, CALCIUM, AST, ALT, ALKPHOS, PROT, BILITOT, EGFR, PT/PTT:No results found for: PT, PTT  Imaging Studies: Reviewed  EKG, Pathology, and Other Studies: Reviewed    Counseling / Coordination of Care    Total floor / unit time spent today 20 minutes  Greater than 50% of total time was spent with the patient and / or family counseling and / or coordination of care  A description of the counseling / coordination of care: chart review, symptoms assessment, emotional support  Opioid rotation   Coordinated plan of care with primary team

## 2021-09-23 NOTE — SOCIAL WORK
Palliative LSW saw patient at the bedside today  LSW appreciates the opportunity to provide patient/family with inpatient emotional support and guidance while patient continues to receive medical attention from the medical team     Topics discussed:  Symptoms discussed, please see [place] Provider's notes for changes to medications  Patient reported that her daughter and  came up to the hospital and surprised her which she enjoyed  Patient's goals continues to be to get home and live her life the way she was  Patient reported that the pain was bad throughout the night that her regular scheduled meds/breakthrough meds were given in order to provide some relief       Areas that need follow-up: ongoing support  Resources given:none  Others present:  Brendon Lutz Provider, Dr David Priest will continue to follow as requested by the medical team, patient, or family

## 2021-09-23 NOTE — NURSING NOTE
Jorge Queen (RN from [facility]) called for an update  Informed nursing staff that Dr Tres Varner is the accepting physician

## 2021-09-23 NOTE — QUICK NOTE
Discussion with WAYNE Ochoa from [facility] regarding getting patient transferred down to [facility] for right pneumonectomy next week  Transfer center called; transfer initiated

## 2021-09-23 NOTE — PROGRESS NOTES
[facility]     Progress Note Grayson Shukla Borne 1975, 39 y o  female MRN: [id number]  Unit/Bed#: -Alexandra Encounter: [id number]  Primary Care Provider: No primary care provider on file  Date and time admitted to hospital: 9/21/2021  3:36 PM    * Chest pain  Assessment & Plan  · Presented with significant chest pain in the setting of cystic fibrosis; history of lung transplant x 2 in 2013, 2017  · Typically follows with [facility]  · ED discussed with transplant team and recommended admission here for pain control  · Palliative consulted for pain management, recommendations appreciated  · Patient taken off of Oxy IR; transitioned to PO Dilaudid today  · 4 mg Dilaudid every 3 hrs prn moderate pain  · 8 mg Dilaudid every 3 hrs prn severe pain  · 1 5 mg Dilaudid every 4 hrs prn breakthrough pain  · Continue ATC acetaminophen 975mg Q8H  · Lidocaine patch to the area  · Chest x-ray (9/21/21): Volume loss on the right side unchanged  Stable chronic right lung base opacities    No pneumothorax  · Pt has appointment with outpatient palliative care through [facility] 9/28    Cystic fibrosis [facility])  Assessment & Plan  · Chronic;  · C/w home creon    Chronic kidney disease  Assessment & Plan  Lab Results   Component Value Date    EGFR 51 09/22/2021    EGFR 57 09/21/2021    EGFR 58 09/09/2021    CREATININE 1 28 09/22/2021    CREATININE 1 18 09/21/2021    CREATININE 1 16 09/09/2021     · Chronic  · Baseline creatinine noted to be 1 0-1 2  · Creatinine on admission noted to be 1 18    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, with long-term current use of insulin [facility])  Assessment & Plan  Lab Results   Component Value Date    HGBA1C 6 6 (H) 04/01/2020       Recent Labs     09/22/21  0754 09/22/21  1204   POCGLU 140 169*       Blood Sugar Average: Last 72 hrs:    · Chronic; well controlled as evidenced by hemoglobin A1C of 6 6  · Continue home insulin regimen  · SSI  · Monitor blood glucose AC/HS    Lung transplant status, bilateral (HCC)  Assessment & Plan  · Undergone b/l lung transplant ,   · C/w suppresive meds    VTE Pharmacologic Prophylaxis: VTE Score: 5 High Risk (Score >/= 5) - Pharmacological DVT Prophylaxis Ordered: apixaban (Eliquis)  Sequential Compression Devices Ordered  Patient Centered Rounds: I performed bedside rounds with nursing staff today  Discussions with Specialists or Other Care Team Provider: Palliative Care    Education and Discussions with Family / Patient: Patient declined call to   Time Spent for Care: 30 minutes  More than 50% of total time spent on counseling and coordination of care as described above  Current Length of Stay: 1 day(s)  Current Patient Status: Inpatient   Certification Statement: The patient will continue to require additional inpatient hospital stay due to pain management  Discharge Plan: Anticipate discharge in 24-48 hrs to home  Code Status: Level 1 - Full Code    Subjective:   Pt sitting up in bed finishing her breakfast  Pt states that she was in a lot of pain overnight  She feels that while her pain is  better controlled with the adjustments made by the Palliative team, it still isn't fully controlled  She states that she still has an increase in  pain with any additional pressure to her rib/lung area  Objective:     Vitals:   Temp (24hrs), Av 5 °F (36 4 °C), Min:97 2 °F (36 2 °C), Max:97 9 °F (36 6 °C)    Temp:  [97 2 °F (36 2 °C)-97 9 °F (36 6 °C)] 97 2 °F (36 2 °C)  HR:  [82-87] 87  Resp:  [18] 18  BP: (110-125)/(61-74) 125/74  SpO2:  [92 %-99 %] 99 %  Body mass index is 21 45 kg/m²  Input and Output Summary (last 24 hours): Intake/Output Summary (Last 24 hours) at 2021 1139  Last data filed at 2021 1742  Gross per 24 hour   Intake 600 ml   Output --   Net 600 ml       Physical Exam:   Physical Exam  Vitals and nursing note reviewed  Constitutional:       General: She is not in acute distress  Appearance: She is well-developed  She is not diaphoretic  HENT:      Head: Normocephalic and atraumatic  Mouth/Throat:      Mouth: Mucous membranes are moist    Eyes:      Conjunctiva/sclera: Conjunctivae normal    Cardiovascular:      Rate and Rhythm: Normal rate and regular rhythm  Pulses: Normal pulses  Heart sounds: Normal heart sounds  No murmur heard  Pulmonary:      Effort: Pulmonary effort is normal  No respiratory distress  Breath sounds: Normal breath sounds  Abdominal:      General: Bowel sounds are normal       Palpations: Abdomen is soft  Tenderness: There is no abdominal tenderness  Musculoskeletal:         General: No swelling  Normal range of motion  Cervical back: Normal range of motion and neck supple  Skin:     General: Skin is warm and dry  Neurological:      Mental Status: She is alert and oriented to person, place, and time  Motor: No weakness     Psychiatric:         Mood and Affect: Mood normal          Behavior: Behavior normal           Additional Data:     Labs:  Results from last 7 days   Lab Units 09/22/21  0538   WBC Thousand/uL 6 92   HEMOGLOBIN g/dL 10 8*   HEMATOCRIT % 34 0*   PLATELETS Thousands/uL 133*   NEUTROS PCT % 51   LYMPHS PCT % 38   MONOS PCT % 8   EOS PCT % 2     Results from last 7 days   Lab Units 09/22/21  0538 09/21/21  1613   SODIUM mmol/L 139 138   POTASSIUM mmol/L 4 5 4 3   CHLORIDE mmol/L 104 102   CO2 mmol/L 27 27   BUN mg/dL 25 22   CREATININE mg/dL 1 28 1 18   ANION GAP mmol/L 8 9   CALCIUM mg/dL 8 5 8 7   ALBUMIN g/dL  --  3 6   TOTAL BILIRUBIN mg/dL  --  0 38   ALK PHOS U/L  --  76   ALT U/L  --  35   AST U/L  --  22   GLUCOSE RANDOM mg/dL 131 212*     Results from last 7 days   Lab Units 09/21/21  1613   INR  1 09     Results from last 7 days   Lab Units 09/23/21  0801 09/22/21  2101 09/22/21  1741 09/22/21  1204 09/22/21  0754   POC GLUCOSE mg/dl 171* 309* 201* 169* 140         Results from last 7 days   Lab Units 09/21/21  1613   LACTIC ACID mmol/L 0 9       Lines/Drains:  Invasive Devices     Central Venous Catheter Line            Port A Cath Right Chest -- days          Drain            Ureteral Drain/Stent Right ureter 6 Fr   596 days                Central Line:  Goal for removal: N/A - Chronic PICC             Imaging: Reviewed radiology reports from this admission including: chest xray    Recent Cultures (last 7 days):         Last 24 Hours Medication List:   Current Facility-Administered Medications   Medication Dose Route Frequency Provider Last Rate    acetaminophen  975 mg Oral [facility] April D WAYNE Breen      acetylcysteine  2 mL Nebulization BID Sai Serrano MD      albuterol  5 mg Nebulization BID Sai Serrano MD      ALPRAZolam  1 mg Oral BID Sai Serrano MD      apixaban  5 mg Oral BID Sai Serrano MD      atovaquone  1,500 mg Oral Daily Sai Serrano MD      dronabinol  5 mg Oral TID Sai Serrano MD      famotidine  20 mg Oral BID Sai Serrano MD      HYDROmorphone  1 5 mg Intravenous Q4H PRN Heather Lewis MD      HYDROmorphone  4 mg Oral Q3H PRN MD Micheal Cartwright HYDROmorphone  8 mg Oral Q3H PRN Heather Lewis MD      insulin lispro  4 Units Subcutaneous TID With Meals Ayana Forte MD      lidocaine  2 patch Topical Daily April D WAYNE Breen      ondansetron  4 mg Intravenous Q6H PRN Ayana Forte MD      pancrelipase (Lip-Prot-Amyl)  48,000 Units Oral TID With Meals Ayana Forte MD      pantoprazole  40 mg Oral Early Morning Sai Serrano MD      polyethylene glycol  17 g Oral BID PRN Ayana Forte MD      predniSONE  10 mg Oral Daily Sai Serrano MD      pregabalin  75 mg Oral TID April D WAYNE Breen      tacrolimus  3 mg Oral BID Ayana Forte MD      traZODone  25 mg Oral HS Ayana Forte MD      valACYclovir  500 mg Oral Daily Ayana Forte MD          Today, Patient Was Seen By: WAYNE David    **Please Note: This note may have been constructed using a voice recognition system  **

## 2021-09-23 NOTE — UTILIZATION REVIEW
Inpatient Admission Authorization Request   NOTIFICATION OF INPATIENT ADMISSION/INPATIENT AUTHORIZATION REQUEST   SERVICING FACILITY:   [address] [place] [address]  Tax ID: [id number]  NPI: [id number]  Place of Service: Inpatient [address] [facility]  [address]  Place of Service Code: 24     ATTENDING PROVIDER:  Attending Name and NPI#: Yuliana Salomon Md [[id number]]  Address: [address] [place] [address]  Phone: [phone]     UTILIZATION REVIEW CONTACT:  [facility] Agu[facility]era Utilization   Network Utilization Review Department  Phone: [phone]  Fax [phone]  Email: [email or web address]     PHYSICIAN ADVISORY SERVICES:  FOR [employer]ET REVIEW - MEDICAL NECESSITY DENIAL  Phone: [phone]  Fax: [phone]  Email: [email or web address]     TYPE OF REQUEST:  Inpatient Status     ADMISSION INFORMATION:  ADMISSION DATE/TIME: 9/22/21  6:46 PM  PATIENT DIAGNOSIS CODE/DESCRIPTION:  CF (cystic fibrosis) ([facility] [address] ) [E84 9]  Intractable pain [R52]  Chest pain varying with breathing [R07 1]  Lung transplant recipient [facility]) [Z94 2]  DISCHARGE DATE/TIME: No discharge date for patient encounter  DISCHARGE DISPOSITION (IF DISCHARGED): Discharge/Transfer to not defined Healthcare Facility     IMPORTANT INFORMATION:  Please contact the Aric Aguilera directly with any questions or concerns regarding this request  Department voicemails are confidential     Send requests for admission clinical reviews, concurrent reviews, approvals, and administrative denials due to lack of clinical to fax [phone]

## 2021-09-24 LAB
GLUCOSE SERPL-MCNC: 175 MG/DL (ref 65–140)
GLUCOSE SERPL-MCNC: 260 MG/DL (ref 65–140)

## 2021-09-24 PROCEDURE — 99232 SBSQ HOSP IP/OBS MODERATE 35: CPT | Performed by: NURSE PRACTITIONER

## 2021-09-24 PROCEDURE — 94640 AIRWAY INHALATION TREATMENT: CPT

## 2021-09-24 PROCEDURE — 94760 N-INVAS EAR/PLS OXIMETRY 1: CPT

## 2021-09-24 PROCEDURE — 82948 REAGENT STRIP/BLOOD GLUCOSE: CPT

## 2021-09-24 RX ORDER — HYDROMORPHONE HCL 110MG/55ML
2 PATIENT CONTROLLED ANALGESIA SYRINGE INTRAVENOUS
Status: DISCONTINUED | OUTPATIENT
Start: 2021-09-24 | End: 2021-09-26 | Stop reason: HOSPADM

## 2021-09-24 RX ORDER — HYDROMORPHONE HYDROCHLORIDE 4 MG/1
12 TABLET ORAL
Status: DISCONTINUED | OUTPATIENT
Start: 2021-09-24 | End: 2021-09-26 | Stop reason: HOSPADM

## 2021-09-24 RX ORDER — HYDROMORPHONE HYDROCHLORIDE 4 MG/1
8 TABLET ORAL
Status: DISCONTINUED | OUTPATIENT
Start: 2021-09-24 | End: 2021-09-26 | Stop reason: HOSPADM

## 2021-09-24 RX ADMIN — ALBUTEROL SULFATE 5 MG: 2.5 SOLUTION RESPIRATORY (INHALATION) at 07:46

## 2021-09-24 RX ADMIN — APIXABAN 5 MG: 5 TABLET, FILM COATED ORAL at 18:01

## 2021-09-24 RX ADMIN — INSULIN LISPRO 4 UNITS: 100 INJECTION, SOLUTION INTRAVENOUS; SUBCUTANEOUS at 17:20

## 2021-09-24 RX ADMIN — ALBUTEROL SULFATE 5 MG: 2.5 SOLUTION RESPIRATORY (INHALATION) at 20:04

## 2021-09-24 RX ADMIN — ACETAMINOPHEN 975 MG: 325 TABLET, FILM COATED ORAL at 22:20

## 2021-09-24 RX ADMIN — HYDROMORPHONE HYDROCHLORIDE 2 MG: 2 INJECTION INTRAMUSCULAR; INTRAVENOUS; SUBCUTANEOUS at 11:19

## 2021-09-24 RX ADMIN — HYDROMORPHONE HYDROCHLORIDE 2 MG: 2 INJECTION INTRAMUSCULAR; INTRAVENOUS; SUBCUTANEOUS at 18:03

## 2021-09-24 RX ADMIN — HYDROMORPHONE HYDROCHLORIDE 12 MG: 4 TABLET ORAL at 12:27

## 2021-09-24 RX ADMIN — HYDROMORPHONE HYDROCHLORIDE 8 MG: 4 TABLET ORAL at 00:12

## 2021-09-24 RX ADMIN — FAMOTIDINE 20 MG: 20 TABLET ORAL at 17:18

## 2021-09-24 RX ADMIN — HYDROMORPHONE HYDROCHLORIDE 12 MG: 4 TABLET ORAL at 09:05

## 2021-09-24 RX ADMIN — ACETAMINOPHEN 975 MG: 325 TABLET, FILM COATED ORAL at 15:33

## 2021-09-24 RX ADMIN — PREGABALIN 75 MG: 75 CAPSULE ORAL at 10:17

## 2021-09-24 RX ADMIN — HYDROMORPHONE HYDROCHLORIDE 2 MG: 2 INJECTION INTRAMUSCULAR; INTRAVENOUS; SUBCUTANEOUS at 23:40

## 2021-09-24 RX ADMIN — INSULIN LISPRO 4 UNITS: 100 INJECTION, SOLUTION INTRAVENOUS; SUBCUTANEOUS at 12:09

## 2021-09-24 RX ADMIN — DRONABINOL 5 MG: 2.5 CAPSULE ORAL at 17:18

## 2021-09-24 RX ADMIN — HYDROMORPHONE HYDROCHLORIDE 2 MG: 2 INJECTION INTRAMUSCULAR; INTRAVENOUS; SUBCUTANEOUS at 08:03

## 2021-09-24 RX ADMIN — HYDROMORPHONE HYDROCHLORIDE 12 MG: 4 TABLET ORAL at 19:00

## 2021-09-24 RX ADMIN — PANTOPRAZOLE SODIUM 40 MG: 40 TABLET, DELAYED RELEASE ORAL at 05:29

## 2021-09-24 RX ADMIN — PREGABALIN 75 MG: 75 CAPSULE ORAL at 15:33

## 2021-09-24 RX ADMIN — LIDOCAINE 5% 2 PATCH: 700 PATCH TOPICAL at 08:50

## 2021-09-24 RX ADMIN — TACROLIMUS 3 MG: 1 CAPSULE ORAL at 18:01

## 2021-09-24 RX ADMIN — HYDROMORPHONE HYDROCHLORIDE 1.5 MG: 2 INJECTION, SOLUTION INTRAMUSCULAR; INTRAVENOUS; SUBCUTANEOUS at 02:29

## 2021-09-24 RX ADMIN — INSULIN LISPRO 4 UNITS: 100 INJECTION, SOLUTION INTRAVENOUS; SUBCUTANEOUS at 09:08

## 2021-09-24 RX ADMIN — ACETYLCYSTEINE 400 MG: 200 SOLUTION ORAL; RESPIRATORY (INHALATION) at 20:04

## 2021-09-24 RX ADMIN — ACETAMINOPHEN 975 MG: 325 TABLET, FILM COATED ORAL at 20:40

## 2021-09-24 RX ADMIN — HYDROMORPHONE HYDROCHLORIDE 12 MG: 4 TABLET ORAL at 23:00

## 2021-09-24 RX ADMIN — HYDROMORPHONE HYDROCHLORIDE 12 MG: 4 TABLET ORAL at 16:00

## 2021-09-24 RX ADMIN — DRONABINOL 5 MG: 2.5 CAPSULE ORAL at 20:38

## 2021-09-24 RX ADMIN — ATOVAQUONE 1500 MG: 750 SUSPENSION ORAL at 10:20

## 2021-09-24 RX ADMIN — PANCRELIPASE 48000 UNITS: 24000; 76000; 120000 CAPSULE, DELAYED RELEASE PELLETS ORAL at 12:09

## 2021-09-24 RX ADMIN — FAMOTIDINE 20 MG: 20 TABLET ORAL at 10:17

## 2021-09-24 RX ADMIN — ACETYLCYSTEINE 400 MG: 200 SOLUTION ORAL; RESPIRATORY (INHALATION) at 07:46

## 2021-09-24 RX ADMIN — HYDROMORPHONE HYDROCHLORIDE 8 MG: 4 TABLET ORAL at 05:31

## 2021-09-24 RX ADMIN — PANCRELIPASE 48000 UNITS: 24000; 76000; 120000 CAPSULE, DELAYED RELEASE PELLETS ORAL at 08:44

## 2021-09-24 RX ADMIN — ACETAMINOPHEN 975 MG: 325 TABLET, FILM COATED ORAL at 05:29

## 2021-09-24 RX ADMIN — POLYETHYLENE GLYCOL 3350 17 G: 17 POWDER, FOR SOLUTION ORAL at 15:31

## 2021-09-24 RX ADMIN — APIXABAN 5 MG: 5 TABLET, FILM COATED ORAL at 10:17

## 2021-09-24 RX ADMIN — VALACYCLOVIR HYDROCHLORIDE 500 MG: 500 TABLET, FILM COATED ORAL at 10:17

## 2021-09-24 RX ADMIN — HYDROMORPHONE HYDROCHLORIDE 2 MG: 2 INJECTION INTRAMUSCULAR; INTRAVENOUS; SUBCUTANEOUS at 21:20

## 2021-09-24 RX ADMIN — PANCRELIPASE 48000 UNITS: 24000; 76000; 120000 CAPSULE, DELAYED RELEASE PELLETS ORAL at 17:04

## 2021-09-24 RX ADMIN — PREGABALIN 75 MG: 75 CAPSULE ORAL at 20:38

## 2021-09-24 RX ADMIN — TACROLIMUS 3 MG: 1 CAPSULE ORAL at 10:20

## 2021-09-24 RX ADMIN — HYDROMORPHONE HYDROCHLORIDE 2 MG: 2 INJECTION INTRAMUSCULAR; INTRAVENOUS; SUBCUTANEOUS at 15:26

## 2021-09-24 RX ADMIN — PREDNISONE 10 MG: 10 TABLET ORAL at 10:17

## 2021-09-24 NOTE — PROGRESS NOTES
Progress note - Palliative and Supportive Care   Eleanor Benjaminshan 39 y o  female [id number]    Patient Active Problem List   Diagnosis    Lung transplant status, bilateral ([facility] )    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, with long-term current use of insulin (HCC)    Abnormal CT scan, colitis    Pancreatic insufficiency    Asthma    Chronic pain disorder    DVT (deep venous thrombosis) (HCC)    Anemia    Long term current use of bisphosphonates    Long term current use of systemic steroids    Osteoporosis    Anxiety    Chronic kidney disease    Chronic sinusitis    CMV (cytomegalovirus infection) status negative    Colon polyp    Cytomegalovirus (CMV) viremia (HCC)    Esophageal reflux    Hiatal hernia    Immunosuppression (HCC)    Thrombocytopenia (HCC)    Severe protein-calorie malnutrition ([facility] )    Transplant rejection    Cystic fibrosis ([facility] )    Essential hypertension    Hypercholesteremia    Chronic respiratory failure with hypoxia (HCC)    Recurrent pneumonia    Fatigue    Chest pain     Active issues specifically addressed today include:   - cystic fibrosis   - s/p bilateral lung transplant, 2013   - s/p L lung transplant, 2017   - CKD   - chronic pain   - palliative care encounter    Plan:  #symptom management  #chronic pain   - continue APAP 975 mg PO Q8H [address]              - max daily 4000 mg   - increase hydromorphone 8 mg PO Q3H PRN [mod pain] or 12 mg PO Q3H PRN [severe pain]   - increase hydromorphone 2 mg IV Q3H PRN [breakthrough]              - OME usage yesterday: 322 5 mg   - continue prednisone 10 mg PO QDaily   - continue pregabalin 75 mg PO TID   - continue lidocaine patch x 2 Q12H on/Q12H off     #anxiety   - continue alprazolam 1 mg PO BID     #insomnia   - continue trazodone 25 mg PO QHS     #appetite stimulant   - continue dronabinol 5 mg PO TID     #OIC   - continue miralax BID PRN to prevent OIC     #nausea   - continue ondansetron 4 mg IV Q6H PRN    #goals of care   - plan to transfer to [facility] for R pneumonectomy    #psychosocial support   - emotional support provided   - Taisha Jackson [phone]      We appreciate the opportunity to participate in this patient's care  We will continue to follow  Please do not hesitate to contact our on-call provider through our clinic answering service at [phone] should you have acute symptom control concerns  Code Status: Full Code - Level 1   Decisional apparatus:  Patient is competent on my exam today  If competence is lost, patient's substitute decision maker would default to spouse by [place]  Advance Directive / Living Will / POLST:  Not on File, not previously completed    Interval history:   - NAEO   - reports PO hydro more effective and PO oxy, but only takes pain from 9->8  Tolerated parenteral dosing of 1 5 mg without adverse event  Feels slight increase to medications would be beneficial  Also anticipating 2+ hour transport time for transfer to [facility], goal to optimize regimen for transfer     - denies nausea, vomiting   - tolerating PO intake without difficulty   - BM daily   - variable sleep in the setting of pain + hospitalization    MEDICATIONS / ALLERGIES:     current meds:   Current Facility-Administered Medications   Medication Dose Route Frequency    acetaminophen (TYLENOL) tablet 975 mg  975 mg Oral Q8H [address]    acetylcysteine (MUCOMYST) 200 mg/mL inhalation solution 400 mg  2 mL Nebulization BID    albuterol inhalation solution 5 mg  5 mg Nebulization BID    ALPRAZolam (XANAX) tablet 1 mg  1 mg Oral BID    apixaban (ELIQUIS) tablet 5 mg  5 mg Oral BID    atovaquone (MEPRON) oral suspension 1,500 mg  1,500 mg Oral Daily    dronabinol (MARINOL) capsule 5 mg  5 mg Oral TID    famotidine (PEPCID) tablet 20 mg  20 mg Oral BID    HYDROmorphone (DILAUDID) injection 1 5 mg  1 5 mg Intravenous Q3H PRN    HYDROmorphone (DILAUDID) tablet 4 mg  4 mg Oral Q3H PRN    Or    HYDROmorphone (DILAUDID) tablet 8 mg  8 mg Oral Q3H PRN    insulin lispro (HumaLOG) 100 units/mL subcutaneous injection 4 Units  4 Units Subcutaneous TID With Meals    lidocaine (LIDODERM) 5 % patch 2 patch  2 patch Topical Daily    ondansetron (ZOFRAN) injection 4 mg  4 mg Intravenous Q6H PRN    pancrelipase (Lip-Prot-Amyl) (CREON) delayed release capsule 48,000 Units  48,000 Units Oral TID With Meals    pantoprazole (PROTONIX) EC tablet 40 mg  40 mg Oral Early Morning    polyethylene glycol (MIRALAX) packet 17 g  17 g Oral BID PRN    predniSONE tablet 10 mg  10 mg Oral Daily    pregabalin (LYRICA) capsule 75 mg  75 mg Oral TID    tacrolimus (PROGRAF) capsule 3 mg  3 mg Oral BID    traZODone (DESYREL) tablet 25 mg  25 mg Oral HS    valACYclovir (VALTREX) tablet 500 mg  500 mg Oral Daily       Allergies   Allergen Reactions    Vancomycin Angioedema and Hives    Ceftazidime Headache     Severe headaches after desensitization       OBJECTIVE:    Physical Exam  Physical Exam  Vitals and nursing note reviewed  Constitutional:       General: She is awake  Appearance: She is not diaphoretic  Comments: Sitting up in bed in NAD  Non-toxic appearing   HENT:      Head: Normocephalic and atraumatic  Right Ear: External ear normal       Left Ear: External ear normal       Nose: No rhinorrhea  Eyes:      Comments: No gaze preference   Pulmonary:      Effort: No tachypnea, accessory muscle usage or respiratory distress  Comments: Completes full sentences without difficulty  NC in place  Musculoskeletal:      Cervical back: Normal range of motion  Neurological:      General: No focal deficit present  Mental Status: She is alert and oriented to person, place, and time     Psychiatric:         Attention and Perception: Attention normal          Mood and Affect: Mood and affect normal          Speech: Speech normal          Cognition and Memory: Cognition and memory normal  Lab Results: I have personally reviewed pertinent labs  , CBC: No results found for: WBC, HGB, HCT, MCV, PLT, ADJUSTEDWBC, MCH, MCHC, RDW, MPV, NRBC, CMP: No results found for: SODIUM, K, CL, CO2, ANIONGAP, BUN, CREATININE, GLUCOSE, CALCIUM, AST, ALT, ALKPHOS, PROT, BILITOT, EGFR, PT/PTT:No results found for: PT, PTT  Imaging Studies: Reviewed  EKG, Pathology, and Other Studies: Reviewed    Counseling / Coordination of Care    Total floor / unit time spent today 20 minutes  Greater than 50% of total time was spent with the patient and / or family counseling and / or coordination of care  A description of the counseling / coordination of care: chart review, symptoms assessment, emotional support  Opioid regimen adjustment  Coordinated plan of care with primary team  Coordinated with RN

## 2021-09-24 NOTE — PROGRESS NOTES
[facility]     Progress Note Nancy Hartman Corporal 1975, 39 y o  female MRN: [id number]  Unit/Bed#: -Alexandra Encounter: [id number]  Primary Care Provider: No primary care provider on file  Date and time admitted to hospital: 9/21/2021  3:36 PM    * Chest pain  Assessment & Plan  · Presented with significant chest pain in the setting of cystic fibrosis; history of lung transplant x 2 in 2013, 2017  · Typically follows with [facility]  · ED discussed with transplant team and recommended admission here for pain control  · Palliative consulted for pain management, recommendations appreciated  · Patient taken off of Oxy IR; transitioned to PO Dilaudid 9/23  · Dosing increased today per Palliative Care  · 8 mg PO Dilaudid every 3 hrs prn moderate pain  · 12 mg PO Dilaudid every 3 hrs prn severe pain  · 2 mg IV Dilaudid every 3 hrs prn breakthrough pain  · Continue ATC acetaminophen 975mg Q8H  · Lidocaine patch to the area  · Chest x-ray (9/21/21): Volume loss on the right side unchanged  Stable chronic right lung base opacities  No pneumothorax  · Pt has appointment with outpatient palliative care through [place] [address] 9/28  · Currently in the process of getting patient transferred to [place] [address] for right pneumonectomy  Cystic fibrosis ([facility] [address] )  Assessment & Plan  · Chronic;  · C/w home creon    Chronic kidney disease  Assessment & Plan  Lab Results   Component Value Date    EGFR 51 09/22/2021    EGFR 57 09/21/2021    EGFR 58 09/09/2021    CREATININE 1 28 09/22/2021    CREATININE 1 18 09/21/2021    CREATININE 1 16 09/09/2021     · Chronic  · Baseline creatinine noted to be 1 0-1 2  · Creatinine on admission noted to be 1 18  · Stable      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, with long-term current use of insulin [facility])  Assessment & Plan  Lab Results   Component Value Date    HGBA1C 6 6 (H) 04/01/2020       Recent Labs     09/23/21  0801 09/23/21  1808 09/23/21 2041 21  0859   POCGLU 171* 250* 240* 175*       Blood Sugar Average: Last 72 hrs:    · Chronic; well controlled as evidenced by hemoglobin A1C of 6 6  · Continue home insulin regimen  · SSI  · Monitor blood glucose AC/HS    Lung transplant status, bilateral (HCC)  Assessment & Plan  · Undergone b/l lung transplant ,   · C/w suppresive meds    VTE Pharmacologic Prophylaxis: VTE Score: 5 High Risk (Score >/= 5) - Pharmacological DVT Prophylaxis Ordered: apixaban (Eliquis)  Sequential Compression Devices Ordered  Patient Centered Rounds: I performed bedside rounds with nursing staff today  Discussions with Specialists or Other Care Team Provider: Palliative Care    Education and Discussions with Family / Patient: Patient declined call to   Time Spent for Care: 20 minutes  More than 50% of total time spent on counseling and coordination of care as described above  Current Length of Stay: 2 day(s)  Current Patient Status: Inpatient   Certification Statement: The patient will continue to require additional inpatient hospital stay due to ongoing treatment in setting of awaiting transfer to [facility], pain control  Discharge Plan: Anticipate discharge later today or tomorrow to [facility]  Code Status: Level 1 - Full Code    Subjective:   CC: "I'm finally feeling a little better "    Patient resting in bed  Denies any complaints of chest pain, shortness of breath, fever or chills  Reports that she is finally starting to get some relief from the Dilaudid for her pain however it is still present  Discussed with her that [facility] did call me yesterday and initiated the transfer to get her down there for the right pneumonectomy  Patient aware and agreeable to this plan      Objective:     Vitals:   Temp (24hrs), Av 7 °F (36 5 °C), Min:97 6 °F (36 4 °C), Max:97 8 °F (36 6 °C)    Temp:  [97 6 °F (36 4 °C)-97 8 °F (36 6 °C)] 97 6 °F (36 4 °C)  HR:  [82-86] 82  Resp:  [15-20] 15  BP: (136-140)/(74-76) 136/74  SpO2:  [89 %-96 %] 94 %  Body mass index is 21 45 kg/m²  Input and Output Summary (last 24 hours):   No intake or output data in the 24 hours ending 09/24/21 1428    Physical Exam:   Physical Exam  Vitals and nursing note reviewed  Constitutional:       General: She is not in acute distress  Appearance: She is normal weight  She is ill-appearing (chronic)  Cardiovascular:      Rate and Rhythm: Normal rate  Pulses: Normal pulses  Heart sounds: Normal heart sounds  Pulmonary:      Effort: Pulmonary effort is normal       Breath sounds: Normal breath sounds  Abdominal:      General: Bowel sounds are normal       Palpations: Abdomen is soft  Musculoskeletal:         General: Normal range of motion  Skin:     General: Skin is warm and dry  Capillary Refill: Capillary refill takes less than 2 seconds  Neurological:      Mental Status: She is alert and oriented to person, place, and time     Psychiatric:         Mood and Affect: Mood normal           Additional Data:     Labs:  Results from last 7 days   Lab Units 09/22/21  0538   WBC Thousand/uL 6 92   HEMOGLOBIN g/dL 10 8*   HEMATOCRIT % 34 0*   PLATELETS Thousands/uL 133*   NEUTROS PCT % 51   LYMPHS PCT % 38   MONOS PCT % 8   EOS PCT % 2     Results from last 7 days   Lab Units 09/22/21  0538 09/21/21  1613   SODIUM mmol/L 139 138   POTASSIUM mmol/L 4 5 4 3   CHLORIDE mmol/L 104 102   CO2 mmol/L 27 27   BUN mg/dL 25 22   CREATININE mg/dL 1 28 1 18   ANION GAP mmol/L 8 9   CALCIUM mg/dL 8 5 8 7   ALBUMIN g/dL  --  3 6   TOTAL BILIRUBIN mg/dL  --  0 38   ALK PHOS U/L  --  76   ALT U/L  --  35   AST U/L  --  22   GLUCOSE RANDOM mg/dL 131 212*     Results from last 7 days   Lab Units 09/21/21  1613   INR  1 09     Results from last 7 days   Lab Units 09/24/21  0859 09/23/21  2041 09/23/21  1808 09/23/21  0801 09/22/21  2101 09/22/21  1741 09/22/21  1204 09/22/21  0754   POC GLUCOSE mg/dl 175* 240* 250* 171* 309* 201* 169* 140         Results from last 7 days   Lab Units 09/21/21  1613   LACTIC ACID mmol/L 0 9       Lines/Drains:  Invasive Devices     Central Venous Catheter Line            Port A Cath Right Chest -- days          Drain            Ureteral Drain/Stent Right ureter 6 Fr  597 days                Central Line:  Goal for removal: N/A - Chronic PICC             Imaging: No pertinent imaging reviewed      Recent Cultures (last 7 days):         Last 24 Hours Medication List:   Current Facility-Administered Medications   Medication Dose Route Frequency Provider Last Rate    acetaminophen  975 mg Oral [facility] April WAYNE Gomes      acetylcysteine  2 mL Nebulization BID Sai Serrano MD      albuterol  5 mg Nebulization BID Sai Serrano MD      ALPRAZolam  1 mg Oral BID Sai Serrano MD      apixaban  5 mg Oral BID Sai Serrano MD      atovaquone  1,500 mg Oral Daily Sai Serrano MD      dronabinol  5 mg Oral TID Sai Serrano MD      famotidine  20 mg Oral BID Susanne Abdi MD      HYDROmorphone  2 mg Intravenous Q3H PRN Olga Travis MD      HYDROmorphone  8 mg Oral Q3H PRN Olga Travis MD      Or   [place] HYDROmorphone  12 mg Oral Q3H PRN Olga Travis MD      insulin lispro  4 Units Subcutaneous TID With Meals Susanne Abdi MD      lidocaine  2 patch Topical Daily April D WAYNE Breen      ondansetron  4 mg Intravenous Q6H PRN Susanne Abdi MD      pancrelipase (Lip-Prot-Amyl)  48,000 Units Oral TID With Meals Susanne Abdi MD      pantoprazole  40 mg Oral Early Morning Sai Serrano MD      polyethylene glycol  17 g Oral BID PRN Susanne Abdi MD      predniSONE  10 mg Oral Daily Sai Serrano MD      pregabalin  75 mg Oral TID April D WAYNE Breen      tacrolimus  3 mg Oral BID Susanne Abdi MD      traZODone  25 mg Oral HS Susanne Abdi MD      valACYclovir  500 mg Oral Daily Susanne Abdi MD          Today, Patient Was Seen By: WAYNE David    **Please Note: This note may have been constructed using a voice recognition system  **

## 2021-09-24 NOTE — ASSESSMENT & PLAN NOTE
Lab Results   Component Value Date    EGFR 51 09/22/2021    EGFR 57 09/21/2021    EGFR 58 09/09/2021    CREATININE 1 28 09/22/2021    CREATININE 1 18 09/21/2021    CREATININE 1 16 09/09/2021     · Chronic  · Baseline creatinine noted to be 1 0-1 2  · Creatinine on admission noted to be 1 18  · Stable

## 2021-09-24 NOTE — ASSESSMENT & PLAN NOTE
· Presented with significant chest pain in the setting of cystic fibrosis; history of lung transplant x 2 in 2013, 2017  · Typically follows with [facility]  · ED discussed with transplant team and recommended admission here for pain control  · Palliative consulted for pain management, recommendations appreciated  · Patient taken off of Oxy IR; transitioned to PO Dilaudid 9/23  · Dosing increased today per Palliative Care  · 8 mg PO Dilaudid every 3 hrs prn moderate pain  · 12 mg PO Dilaudid every 3 hrs prn severe pain  · 2 mg IV Dilaudid every 3 hrs prn breakthrough pain  · Continue ATC acetaminophen 975mg Q8H  · Lidocaine patch to the area  · Chest x-ray (9/21/21): Volume loss on the right side unchanged  Stable chronic right lung base opacities  No pneumothorax  · Pt has appointment with outpatient palliative care through [place] [address] 9/28  · Currently in the process of getting patient transferred to [place] [address] for right pneumonectomy

## 2021-09-24 NOTE — UTILIZATION REVIEW
Inpatient Admission Authorization Request   NOTIFICATION OF INPATIENT ADMISSION/INPATIENT AUTHORIZATION REQUEST   SERVICING FACILITY:   [address] [place] [address]  Tax ID: [id number]  NPI: [id number]  Place of Service: Inpatient [address] [facility]  [address]  Place of Service Code: 24     ATTENDING PROVIDER:  Attending Name and NPI#: Tone Martínez Md [[id number]]  Address: [address] [place] [address]  Phone: [phone]     UTILIZATION REVIEW CONTACT:  Isak Jarrett Utilization   Network Utilization Review Department  Phone: [phone]  Fax [phone]  Email: [email or web address]     PHYSICIAN ADVISORY SERVICES:  FOR MGMP-MS-VGHA REVIEW - MEDICAL NECESSITY DENIAL  Phone: [phone]  Fax: [phone]  Email: [email or web address]     TYPE OF REQUEST:  Inpatient Status     ADMISSION INFORMATION:  ADMISSION DATE/TIME: 9/22/21  6:46 PM  PATIENT DIAGNOSIS CODE/DESCRIPTION:  CF (cystic fibrosis) ([facility] [address] ) [E84 9]  Intractable pain [R52]  Chest pain varying with breathing [R07 1]  Lung transplant recipient [facility]) [Z94 2]  DISCHARGE DATE/TIME: No discharge date for patient encounter  DISCHARGE DISPOSITION (IF DISCHARGED): Discharge/Transfer to not defined Healthcare Facility     IMPORTANT INFORMATION:  Please contact the Isak Jarrett directly with any questions or concerns regarding this request  Department voicemails are confidential     Send requests for admission clinical reviews, concurrent reviews, approvals, and administrative denials due to lack of clinical to fax [phone]

## 2021-09-24 NOTE — CASE MANAGEMENT
Case Management Discharge Planning Note    Patient name Michela Scanlon  Location [address] 310/-31 MRN [id number]  : 1975 Date 2021       Current Admission Date: 2021  Current Admission Diagnosis:  Chest pain  Previous Admission - Discharge Date:21   LOS (days): 2  Geometric Mean LOS (GMLOS) (days):   Days to GMLOS: Previous Discharge Diagnosis:  There are no discharge diagnoses documented for the most recent discharge  OBJECTIVE:  Risk of Unplanned Readmission Score: 22   Bundle(if applicable):    Current admission status: Inpatient  Preferred Pharmacy:   [id number] [place] [address]  [address] [place] [address], [address] [place] [address]  Phone: [phone] Fax: [phone]    Primary Care Provider: No primary care provider on file  Primary Insurance: BLUE CROSS  Secondary Insurance: MEDICARE    DISCHARGE DETAILS:    Discharge planning discussed with[de-identified] KORY  Freedom of Choice: Yes  Comments - Freedom of Choice: Per rounding with SLIM, patient is [facility]  The transfer was started yesterday

## 2021-09-24 NOTE — ASSESSMENT & PLAN NOTE
Lab Results   Component Value Date    HGBA1C 6 6 (H) 04/01/2020       Recent Labs     09/23/21  0801 09/23/21  1808 09/23/21 2041 09/24/21  0859   POCGLU 171* 250* 240* 175*       Blood Sugar Average: Last 72 hrs:    · Chronic; well controlled as evidenced by hemoglobin A1C of 6 6  · Continue home insulin regimen  · SSI  · Monitor blood glucose AC/HS

## 2021-09-24 NOTE — SOCIAL WORK
Palliative LSW saw patient at the bedside today  LSW appreciates the opportunity to provide patient/family with inpatient emotional support and guidance while patient continues to receive medical attention from the medical team     Topics discussed: Patient and LSW discussed transfer to [facility] for her surgery  Patient reported that she had been trying not to get excited about the possibility because in the past she was supposed to get it and it was then cancelled, leading to feelings of disappointment, frustration, anger  Patient and LSW processed feelings today  Patient able to acknowledge feeling a little worried about the surgery because it's complicated but reports feeling positive about it because she believes that it will lead to a return to her "normal" life  Patient has been very focused on maintaining positivity, not trying to worry about things  Discussed the importance of acknowledging that she has fears and validating them for herself but also letting it go and not fixate on it  Patient discussed visually placing her fears in a file cabinet, up and away  Patient continues to engage in Mindfulness exercises and Meditation and continues to report the positive impact it has had on her physically  LSW and patient discussed how our mental health can impact our physical health and vice versa  Patient continues to work on being centered while acknowledging those that may pull her away  Patient/LSW discussed the importance and value in self-care, giving to others and forgiveness  Discussed how holding onto instead of forgiving can bring one down, bring negativity  Patient discussed one of struggles of going to [facility] is that her daughter won't be able to visit due to age restrictions   Patient focused on being able to spend time together in this hospital and how her and her  communicated with their daughter about what was going on, intentionally in a way not to cause increased fear for their 5 year old  Patient discussed intentionally finding the positive in every situation even the smallest of things  Discussed although she is in the hospital, with her health conditions, she doesn't get upset or down from it,  she focuses on how she is somewhere where they are helping her manage her pain and symptoms, that she doesn't have to cook, they bring her food  Areas that need follow-up: Ongoing emotional support  Resources given: none  Others present: At very end of visit, patient's sister and brother-in-law came into visit with patient  LSW excused self for room to allow for the visit to take place       LSW will continue to follow as requested by the medical team, patient, or family

## 2021-09-24 NOTE — PLAN OF CARE
Problem: PAIN - ADULT  Goal: Verbalizes/displays adequate comfort level or baseline comfort level  Description: Interventions:  - Encourage patient to monitor pain and request assistance  - Assess pain using appropriate pain scale  - Administer analgesics based on type and severity of pain and evaluate response  - Implement non-pharmacological measures as appropriate and evaluate response  - Consider cultural and social influences on pain and pain management  - Notify physician/advanced practitioner if interventions unsuccessful or patient reports new pain  9/24/2021 0053 by John Nagel RN  Outcome: Progressing  9/24/2021 0053 by John Nagel RN  Outcome: Progressing     Problem: INFECTION - ADULT  Goal: Absence or prevention of progression during hospitalization  Description: INTERVENTIONS:  - Assess and monitor for signs and symptoms of infection  - Monitor lab/diagnostic results  - Monitor all insertion sites, i e  indwelling lines, tubes, and drains  - Monitor endotracheal if appropriate and nasal secretions for changes in amount and color  - [place] appropriate cooling/warming therapies per order  - Administer medications as ordered  - Instruct and encourage patient and family to use good hand hygiene technique  - Identify and instruct in appropriate isolation precautions for identified infection/condition  9/24/2021 0053 by John Nagel RN  Outcome: Progressing  9/24/2021 0053 by John Nagel RN  Outcome: Progressing  Goal: Absence of fever/infection during neutropenic period  Description: INTERVENTIONS:  - Monitor WBC    9/24/2021 0053 by John Nagel RN  Outcome: Progressing  9/24/2021 0053 by John Nagel RN  Outcome: Progressing     Problem: DISCHARGE PLANNING  Goal: Discharge to home or other facility with appropriate resources  Description: INTERVENTIONS:  - Identify barriers to discharge w/patient and caregiver  - Arrange for needed discharge resources and transportation as appropriate  - Identify discharge learning needs (meds, wound care, etc )  - Arrange for interpretive services to assist at discharge as needed  - Refer to Case Management Department for coordinating discharge planning if the patient needs post-hospital services based on physician/advanced practitioner order or complex needs related to functional status, cognitive ability, or social support system  9/24/2021 0053 by Coleen Schumacher RN  Outcome: Progressing  9/24/2021 0053 by Coleen Schumacher RN  Outcome: Progressing     Problem: Knowledge Deficit  Goal: Patient/family/caregiver demonstrates understanding of disease process, treatment plan, medications, and discharge instructions  Description: Complete learning assessment and assess knowledge base    Interventions:  - Provide teaching at level of understanding  - Provide teaching via preferred learning methods  9/24/2021 0053 by Coleen Schumacher RN  Outcome: Progressing  9/24/2021 0053 by Coleen Schumacher RN  Outcome: Progressing     Problem: RESPIRATORY - ADULT  Goal: Achieves optimal ventilation and oxygenation  Description: INTERVENTIONS:  - Assess for changes in respiratory status  - Assess for changes in mentation and behavior  - Position to facilitate oxygenation and minimize respiratory effort  - Oxygen administered by appropriate delivery if ordered  - Initiate smoking cessation education as indicated  - Encourage broncho-pulmonary hygiene including cough, deep breathe, Incentive Spirometry  - Assess the need for suctioning and aspirate as needed  - Assess and instruct to report SOB or any respiratory difficulty  - Respiratory Therapy support as indicated  9/24/2021 0053 by Coleen Schumacher RN  Outcome: Progressing  9/24/2021 0053 by Coleen Schumacher RN  Outcome: Progressing     Problem: Potential for Falls  Goal: Patient will remain free of falls  Description: INTERVENTIONS:  - Educate patient/family on patient safety including physical limitations  - Instruct patient to call for assistance with activity   - Consult OT/PT to assist with strengthening/mobility   - Keep Call bell within reach  - Keep bed low and locked with side rails adjusted as appropriate  - Keep care items and personal belongings within reach  - Initiate and maintain comfort rounds  - Make Fall Risk Sign visible to staff  - Offer Toileting every 2 Hours, in advance of need  - Initiate/Maintain Bed alarm  - Obtain necessary fall risk management equipment: Bed Alarm  - Apply yellow socks and bracelet for high fall risk patients  - Consider moving patient to room near nurses station  9/24/2021 0053 by Rebeca Mcnamara RN  Outcome: Progressing  9/24/2021 0053 by Rebeca Mcnamara RN  Outcome: Progressing     Problem: MOBILITY - ADULT  Goal: Maintain or return to baseline ADL function  Description: INTERVENTIONS:  -  Assess patient's ability to carry out ADLs; assess patient's baseline for ADL function and identify physical deficits which impact ability to perform ADLs (bathing, care of mouth/teeth, toileting, grooming, dressing, etc )  - Assess/evaluate cause of self-care deficits   - Assess range of motion  - Assess patient's mobility; develop plan if impaired  - Assess patient's need for assistive devices and provide as appropriate  - Encourage maximum independence but intervene and supervise when necessary  - Involve family in performance of ADLs  - Assess for home care needs following discharge   - Consider OT consult to assist with ADL evaluation and planning for discharge  - Provide patient education as appropriate  9/24/2021 0053 by Rebeca Mcnamara RN  Outcome: Progressing  9/24/2021 0053 by Rebeca Mcnamara RN  Outcome: Progressing  Goal: Maintains/Returns to pre admission functional level  Description: INTERVENTIONS:  - Perform BMAT or MOVE assessment daily    - Set and communicate daily mobility goal to care team and patient/family/caregiver     - Collaborate with rehabilitation services on mobility goals if consulted  - Perform Range of Motion 4 times a day  - Reposition patient every 2 hours    - Dangle patient 4 times a day  - Stand patient 3 times a day  - Ambulate patient 3 times a day  - Out of bed to chair 3 times a day   - Out of bed for meals 3 times a day  - Out of bed for toileting  - Record patient progress and toleration of activity level   9/24/2021 0053 by John Cason RN  Outcome: Progressing  9/24/2021 0053 by John Cason RN  Outcome: Progressing

## 2021-09-25 VITALS
TEMPERATURE: 98 F | HEART RATE: 74 BPM | DIASTOLIC BLOOD PRESSURE: 62 MMHG | WEIGHT: 117.28 LBS | OXYGEN SATURATION: 93 % | HEIGHT: 62 IN | SYSTOLIC BLOOD PRESSURE: 124 MMHG | BODY MASS INDEX: 21.58 KG/M2 | RESPIRATION RATE: 18 BRPM

## 2021-09-25 LAB
GLUCOSE SERPL-MCNC: 155 MG/DL (ref 65–140)
GLUCOSE SERPL-MCNC: 163 MG/DL (ref 65–140)
GLUCOSE SERPL-MCNC: 173 MG/DL (ref 65–140)
GLUCOSE SERPL-MCNC: 218 MG/DL (ref 65–140)

## 2021-09-25 PROCEDURE — 82948 REAGENT STRIP/BLOOD GLUCOSE: CPT

## 2021-09-25 PROCEDURE — 99232 SBSQ HOSP IP/OBS MODERATE 35: CPT | Performed by: NURSE PRACTITIONER

## 2021-09-25 PROCEDURE — 94760 N-INVAS EAR/PLS OXIMETRY 1: CPT

## 2021-09-25 PROCEDURE — 94640 AIRWAY INHALATION TREATMENT: CPT

## 2021-09-25 RX ORDER — NALOXONE HYDROCHLORIDE 0.4 MG/ML
0.4 INJECTION, SOLUTION INTRAMUSCULAR; INTRAVENOUS; SUBCUTANEOUS
Status: DISCONTINUED | OUTPATIENT
Start: 2021-09-25 | End: 2021-09-26 | Stop reason: HOSPADM

## 2021-09-25 RX ORDER — HYDROMORPHONE HCL/PF 1 MG/ML
1 SYRINGE (ML) INJECTION ONCE
Status: COMPLETED | OUTPATIENT
Start: 2021-09-25 | End: 2021-09-25

## 2021-09-25 RX ADMIN — ALBUTEROL SULFATE 5 MG: 2.5 SOLUTION RESPIRATORY (INHALATION) at 07:50

## 2021-09-25 RX ADMIN — PREGABALIN 75 MG: 75 CAPSULE ORAL at 21:26

## 2021-09-25 RX ADMIN — HYDROMORPHONE HYDROCHLORIDE 12 MG: 4 TABLET ORAL at 02:31

## 2021-09-25 RX ADMIN — HYDROMORPHONE HYDROCHLORIDE 12 MG: 4 TABLET ORAL at 05:41

## 2021-09-25 RX ADMIN — HYDROMORPHONE HYDROCHLORIDE 2 MG: 2 INJECTION INTRAMUSCULAR; INTRAVENOUS; SUBCUTANEOUS at 21:25

## 2021-09-25 RX ADMIN — ACETAMINOPHEN 975 MG: 325 TABLET, FILM COATED ORAL at 21:27

## 2021-09-25 RX ADMIN — TACROLIMUS 3 MG: 1 CAPSULE ORAL at 08:07

## 2021-09-25 RX ADMIN — TACROLIMUS 3 MG: 1 CAPSULE ORAL at 17:39

## 2021-09-25 RX ADMIN — HYDROMORPHONE HYDROCHLORIDE 1 MG: 1 INJECTION, SOLUTION INTRAMUSCULAR; INTRAVENOUS; SUBCUTANEOUS at 11:52

## 2021-09-25 RX ADMIN — HYDROMORPHONE HYDROCHLORIDE 2 MG: 2 INJECTION INTRAMUSCULAR; INTRAVENOUS; SUBCUTANEOUS at 17:46

## 2021-09-25 RX ADMIN — HYDROMORPHONE HYDROCHLORIDE 12 MG: 4 TABLET ORAL at 23:25

## 2021-09-25 RX ADMIN — PANCRELIPASE 48000 UNITS: 24000; 76000; 120000 CAPSULE, DELAYED RELEASE PELLETS ORAL at 11:56

## 2021-09-25 RX ADMIN — APIXABAN 5 MG: 5 TABLET, FILM COATED ORAL at 17:39

## 2021-09-25 RX ADMIN — ATOVAQUONE 1500 MG: 750 SUSPENSION ORAL at 08:08

## 2021-09-25 RX ADMIN — PREGABALIN 75 MG: 75 CAPSULE ORAL at 15:52

## 2021-09-25 RX ADMIN — INSULIN LISPRO 4 UNITS: 100 INJECTION, SOLUTION INTRAVENOUS; SUBCUTANEOUS at 08:09

## 2021-09-25 RX ADMIN — INSULIN LISPRO 4 UNITS: 100 INJECTION, SOLUTION INTRAVENOUS; SUBCUTANEOUS at 17:46

## 2021-09-25 RX ADMIN — FAMOTIDINE 20 MG: 20 TABLET ORAL at 08:05

## 2021-09-25 RX ADMIN — ACETYLCYSTEINE 400 MG: 200 SOLUTION ORAL; RESPIRATORY (INHALATION) at 07:50

## 2021-09-25 RX ADMIN — ACETYLCYSTEINE 400 MG: 200 SOLUTION ORAL; RESPIRATORY (INHALATION) at 20:36

## 2021-09-25 RX ADMIN — LIDOCAINE 5% 2 PATCH: 700 PATCH TOPICAL at 08:08

## 2021-09-25 RX ADMIN — HYDROMORPHONE HYDROCHLORIDE 2 MG: 2 INJECTION INTRAMUSCULAR; INTRAVENOUS; SUBCUTANEOUS at 13:51

## 2021-09-25 RX ADMIN — APIXABAN 5 MG: 5 TABLET, FILM COATED ORAL at 08:05

## 2021-09-25 RX ADMIN — VALACYCLOVIR HYDROCHLORIDE 500 MG: 500 TABLET, FILM COATED ORAL at 08:05

## 2021-09-25 RX ADMIN — HYDROMORPHONE HYDROCHLORIDE 2 MG: 2 INJECTION INTRAMUSCULAR; INTRAVENOUS; SUBCUTANEOUS at 03:39

## 2021-09-25 RX ADMIN — PANTOPRAZOLE SODIUM 40 MG: 40 TABLET, DELAYED RELEASE ORAL at 05:41

## 2021-09-25 RX ADMIN — ACETAMINOPHEN 975 MG: 325 TABLET, FILM COATED ORAL at 05:41

## 2021-09-25 RX ADMIN — FAMOTIDINE 20 MG: 20 TABLET ORAL at 17:39

## 2021-09-25 RX ADMIN — PANCRELIPASE 48000 UNITS: 24000; 76000; 120000 CAPSULE, DELAYED RELEASE PELLETS ORAL at 08:07

## 2021-09-25 RX ADMIN — PANCRELIPASE 48000 UNITS: 24000; 76000; 120000 CAPSULE, DELAYED RELEASE PELLETS ORAL at 17:39

## 2021-09-25 RX ADMIN — HYDROMORPHONE HYDROCHLORIDE 12 MG: 4 TABLET ORAL at 12:55

## 2021-09-25 RX ADMIN — ALBUTEROL SULFATE 5 MG: 2.5 SOLUTION RESPIRATORY (INHALATION) at 20:36

## 2021-09-25 RX ADMIN — INSULIN LISPRO 4 UNITS: 100 INJECTION, SOLUTION INTRAVENOUS; SUBCUTANEOUS at 11:58

## 2021-09-25 RX ADMIN — HYDROMORPHONE HYDROCHLORIDE 12 MG: 4 TABLET ORAL at 15:52

## 2021-09-25 RX ADMIN — HYDROMORPHONE HYDROCHLORIDE 12 MG: 4 TABLET ORAL at 20:15

## 2021-09-25 RX ADMIN — PREGABALIN 75 MG: 75 CAPSULE ORAL at 08:05

## 2021-09-25 RX ADMIN — PREDNISONE 10 MG: 10 TABLET ORAL at 08:05

## 2021-09-25 RX ADMIN — HYDROMORPHONE HYDROCHLORIDE 2 MG: 2 INJECTION INTRAMUSCULAR; INTRAVENOUS; SUBCUTANEOUS at 07:16

## 2021-09-25 RX ADMIN — TRAZODONE HYDROCHLORIDE 25 MG: 50 TABLET ORAL at 21:26

## 2021-09-25 NOTE — ASSESSMENT & PLAN NOTE
Lab Results   Component Value Date    EGFR 51 09/22/2021    EGFR 57 09/21/2021    EGFR 58 09/09/2021    CREATININE 1 28 09/22/2021    CREATININE 1 18 09/21/2021    CREATININE 1 16 09/09/2021     · Chronic  · Baseline creatinine noted to be 1 0-1 2  · Creatinine on admission noted to be 1 18  · Will check labs this morning

## 2021-09-25 NOTE — ASSESSMENT & PLAN NOTE
· Presented with significant chest pain in the setting of cystic fibrosis; history of lung transplant x 2 in 2013, 2017  · Typically follows with [facility]  · ED discussed with transplant team and recommended admission here for pain control  · Palliative consulted for pain management, recommendations appreciated  · Patient taken off of Oxy IR; transitioned to PO Dilaudid 9/23  · Dosing increased today per Palliative Care  · 8 mg PO Dilaudid every 3 hrs prn moderate pain  · 12 mg PO Dilaudid every 3 hrs prn severe pain  · 2 mg IV Dilaudid every 3 hrs prn breakthrough pain  · Continue ATC acetaminophen 975mg Q8H  · Lidocaine patch to the area  · Chest x-ray (9/21/21): Volume loss on the right side unchanged  Stable chronic right lung base opacities  No pneumothorax  · Pt has appointment with outpatient palliative care through [facility] 9/28  · Currently in the process of getting patient transferred to [facility] for right pneumonectomy

## 2021-09-25 NOTE — ASSESSMENT & PLAN NOTE
Lab Results   Component Value Date    HGBA1C 6 6 (H) 04/01/2020       Recent Labs     09/23/21  1808 09/23/21  2041 09/24/21  0859 09/24/21  1639   POCGLU 250* 240* 175* 260*       Blood Sugar Average: Last 72 hrs:    · Chronic; well controlled as evidenced by hemoglobin A1C of 6 6  · Continue home insulin regimen  · SSI  · Monitor blood glucose AC/HS

## 2021-09-25 NOTE — PROGRESS NOTES
[address] [facility]     Progress Note Dawson Blank 1975, 39 y o  female MRN: [id number]  Unit/Bed#: -Alexandra Encounter: [id number]  Primary Care Provider: No primary care provider on file  Date and time admitted to hospital: 9/21/2021  3:36 PM    * Chest pain  Assessment & Plan  · Presented with significant chest pain in the setting of cystic fibrosis; history of lung transplant x 2 in 2013, 2017  · Typically follows with [facility]  · ED discussed with transplant team and recommended admission here for pain control  · Palliative consulted for pain management, recommendations appreciated  · Patient taken off of Oxy IR; transitioned to PO Dilaudid 9/23  · Dosing increased today per Palliative Care  · 8 mg PO Dilaudid every 3 hrs prn moderate pain  · 12 mg PO Dilaudid every 3 hrs prn severe pain  · 2 mg IV Dilaudid every 3 hrs prn breakthrough pain  · Continue ATC acetaminophen 975mg Q8H  · Lidocaine patch to the area  · Chest x-ray (9/21/21): Volume loss on the right side unchanged  Stable chronic right lung base opacities  No pneumothorax  · Pt has appointment with outpatient palliative care through [facility] 9/28  · Currently in the process of getting patient transferred to 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)  Assessment & Plan  Lab Results   Component Value Date    HGBA1C 6 6 (H) 04/01/2020       Recent Labs     09/23/21  1804 211 21  0859 21  1639   POCGLU 250* 240* 175* 260*       Blood Sugar Average: Last 72 hrs:    · Chronic; well controlled as evidenced by hemoglobin A1C of 6 6  · Continue home insulin regimen  · SSI  · Monitor blood glucose AC/HS    Lung transplant status, bilateral (HCC)  Assessment & Plan  · Undergone b/l lung transplant ,   · C/w suppresive meds      VTE Pharmacologic Prophylaxis: VTE Score: 5 High Risk (Score >/= 5) - Pharmacological DVT Prophylaxis Ordered: apixaban (Eliquis)  Sequential Compression Devices Ordered  Patient Centered Rounds: I performed bedside rounds with nursing staff today  Discussions with Specialists or Other Care Team Provider:     Education and Discussions with Family / Patient: Updated  ( and daughter) at bedside  Time Spent for Care: 20 minutes  More than 50% of total time spent on counseling and coordination of care as described above  Current Length of Stay: 3 day(s)  Current Patient Status: Inpatient   Certification Statement: The patient will continue to require additional inpatient hospital stay due to ongoing treatment in setting of pain control, need for transfer to [facility]  Discharge Plan: Anticipate discharge later today or tomorrow to [facility]    Code Status: Level 1 - Full Code    Subjective:   CC: "I'm doing okay "    Patient resting in bed at this time  Denies any complaints of worsening chest pain, shortness of breath, fever or chills  She still has some pain at baseline however she reports that the new pain medication regimen is working very well for her, she states that her pain was under control yesterday until she was late with 1 dose and her pain then became uncontrolled  Discussed that we will give her an additional dose of IV medication but leave the oral dosing the same at this time      Objective:     Vitals:   Temp (24hrs), Av 1 °F (36 7 °C), Min:97 8 °F (36 6 °C), Max:98 3 °F (36 8 °C)    Temp: [97 8 °F (36 6 °C)-98 3 °F (36 8 °C)] 98 3 °F (36 8 °C)  HR:  [77-82] 78  Resp:  [18-19] 19  BP: (114-135)/(72-75) 114/72  SpO2:  [91 %-96 %] 91 %  Body mass index is 21 45 kg/m²  Input and Output Summary (last 24 hours): Intake/Output Summary (Last 24 hours) at 9/25/2021 1107  Last data filed at 9/25/2021 1039  Gross per 24 hour   Intake 360 ml   Output --   Net 360 ml       Physical Exam:   Physical Exam  Vitals and nursing note reviewed  Constitutional:       General: She is not in acute distress  Appearance: She is normal weight  She is ill-appearing  Cardiovascular:      Rate and Rhythm: Normal rate  Pulses: Normal pulses  Heart sounds: Normal heart sounds  Pulmonary:      Effort: Pulmonary effort is normal       Breath sounds: Normal breath sounds  Abdominal:      General: Bowel sounds are normal       Palpations: Abdomen is soft  Musculoskeletal:         General: Normal range of motion  Skin:     General: Skin is warm and dry  Capillary Refill: Capillary refill takes less than 2 seconds  Neurological:      Mental Status: She is alert and oriented to person, place, and time     Psychiatric:         Mood and Affect: Mood normal           Additional Data:     Labs:  Results from last 7 days   Lab Units 09/22/21  0538   WBC Thousand/uL 6 92   HEMOGLOBIN g/dL 10 8*   HEMATOCRIT % 34 0*   PLATELETS Thousands/uL 133*   NEUTROS PCT % 51   LYMPHS PCT % 38   MONOS PCT % 8   EOS PCT % 2     Results from last 7 days   Lab Units 09/22/21  0538 09/21/21  1613   SODIUM mmol/L 139 138   POTASSIUM mmol/L 4 5 4 3   CHLORIDE mmol/L 104 102   CO2 mmol/L 27 27   BUN mg/dL 25 22   CREATININE mg/dL 1 28 1 18   ANION GAP mmol/L 8 9   CALCIUM mg/dL 8 5 8 7   ALBUMIN g/dL  --  3 6   TOTAL BILIRUBIN mg/dL  --  0 38   ALK PHOS U/L  --  76   ALT U/L  --  35   AST U/L  --  22   GLUCOSE RANDOM mg/dL 131 212*     Results from last 7 days   Lab Units 09/21/21  1613   INR  1 09     Results from last 7 days   Lab Units 09/25/21  1057 09/25/21  0737 09/24/21  1639 09/24/21  0859 09/23/21  2041 09/23/21  1808 09/23/21  0801 09/22/21  2101 09/22/21  1741 09/22/21  1204 09/22/21  0754   POC GLUCOSE mg/dl 163* 155* 260* 175* 240* 250* 171* 309* 201* 169* 140         Results from last 7 days   Lab Units 09/21/21  1613   LACTIC ACID mmol/L 0 9       Lines/Drains:  Invasive Devices     Central Venous Catheter Line            Port A Cath Right Chest -- days          Drain            Ureteral Drain/Stent Right ureter 6 Fr  598 days                Central Line:  Goal for removal: N/A - Chronic PICC             Imaging: No pertinent imaging reviewed      Recent Cultures (last 7 days):         Last 24 Hours Medication List:   Current Facility-Administered Medications   Medication Dose Route Frequency Provider Last Rate    acetaminophen  975 mg Oral [facility] April D WAYNE Breen      acetylcysteine  2 mL Nebulization BID Sai Serrano MD      albuterol  5 mg Nebulization BID Sai Serrano MD      ALPRAZolam  1 mg Oral BID Sai Serrano MD      apixaban  5 mg Oral BID Sai Serrano MD      atovaquone  1,500 mg Oral Daily Sai Serrano MD      dronabinol  5 mg Oral TID Sai Serrano MD      famotidine  20 mg Oral BID Zoran Kay MD      HYDROmorphone  1 mg Intravenous Once WAYNE Alarcon      HYDROmorphone  2 mg Intravenous Q3H PRN Chuy López MD      HYDROmorphone  8 mg Oral Q3H PRN Chuy López MD      Or   [facility] HYDROmorphone  12 mg Oral Q3H PRN Chuy López MD      insulin lispro  4 Units Subcutaneous TID With Meals Zoran Kay MD      lidocaine  2 patch Topical Daily April D WAYNE Breen      ondansetron  4 mg Intravenous Q6H PRN Zoran Kay MD      pancrelipase (Lip-Prot-Amyl)  48,000 Units Oral TID With Meals Zoran Kay MD      pantoprazole  40 mg Oral Early Morning Sai Serrano MD      polyethylene glycol  17 g Oral BID PRN Zoran Kay MD      predniSONE  10 mg Oral Daily Sai Serrano MD      pregabalin  75 mg Oral TID April D WAYNE Breen      tacrolimus  3 mg Oral BID Trinh Carolina MD      traZODone  25 mg Oral HS Trinh Carolina MD      valACYclovir  500 mg Oral Daily Trinh Carolina MD          Today, Patient Was Seen By: WAYNE Wong    **Please Note: This note may have been constructed using a voice recognition system  **

## 2021-09-26 PROCEDURE — 99238 HOSP IP/OBS DSCHRG MGMT 30/<: CPT | Performed by: NURSE PRACTITIONER

## 2021-09-26 RX ORDER — LIDOCAINE 50 MG/G
2 PATCH TOPICAL DAILY
Refills: 0
Start: 2021-09-26 | End: 2022-02-11 | Stop reason: ALTCHOICE

## 2021-09-26 RX ORDER — ACETAMINOPHEN 325 MG/1
975 TABLET ORAL EVERY 8 HOURS SCHEDULED
Refills: 0
Start: 2021-09-26

## 2021-09-26 RX ORDER — PREGABALIN 75 MG/1
75 CAPSULE ORAL 3 TIMES DAILY
Qty: 30 CAPSULE | Refills: 0
Start: 2021-09-26 | End: 2021-12-02 | Stop reason: SDUPTHER

## 2021-09-26 RX ORDER — PANTOPRAZOLE SODIUM 40 MG/1
40 TABLET, DELAYED RELEASE ORAL
Refills: 0
Start: 2021-09-26 | End: 2022-03-22 | Stop reason: ALTCHOICE

## 2021-09-26 RX ORDER — TRAZODONE HYDROCHLORIDE 50 MG/1
25 TABLET ORAL
Refills: 0
Start: 2021-09-26

## 2021-09-26 RX ORDER — ALBUTEROL SULFATE 2.5 MG/3ML
5 SOLUTION RESPIRATORY (INHALATION)
Refills: 0
Start: 2021-09-26

## 2021-09-26 RX ORDER — ACETYLCYSTEINE 200 MG/ML
2 SOLUTION ORAL; RESPIRATORY (INHALATION)
Refills: 0
Start: 2021-09-26 | End: 2022-04-12 | Stop reason: ALTCHOICE

## 2021-09-26 RX ADMIN — HYDROMORPHONE HYDROCHLORIDE 2 MG: 2 INJECTION INTRAMUSCULAR; INTRAVENOUS; SUBCUTANEOUS at 00:38

## 2021-09-26 NOTE — ASSESSMENT & PLAN NOTE
· Presented with significant chest pain in the setting of cystic fibrosis; history of lung transplant x 2 in 2013, 2017  · Typically follows with [facility]  · ED discussed with transplant team and recommended admission here for pain control  · Palliative consulted for pain management, recommendations appreciated  · Patient taken off of Oxy IR; transitioned to PO Dilaudid 9/23  · Dosing increased  yesterday per Palliative Care  · 8 mg PO Dilaudid every 3 hrs prn moderate pain  · 12 mg PO Dilaudid every 3 hrs prn severe pain  · 2 mg IV Dilaudid every 3 hrs prn breakthrough pain  · Continue ATC acetaminophen 975mg Q8H  · Lidocaine patch to the area  · Chest x-ray (9/21/21): Volume loss on the right side unchanged  Stable chronic right lung base opacities  No pneumothorax  · Pt has appointment with outpatient palliative care through [facility] 9/28  · Patient is being  transferred to [facility] for right pneumonectomy tonight

## 2021-09-26 NOTE — ASSESSMENT & PLAN NOTE
Lab Results   Component Value Date    EGFR 51 09/22/2021    EGFR 57 09/21/2021    EGFR 58 09/09/2021    CREATININE 1 28 09/22/2021    CREATININE 1 18 09/21/2021    CREATININE 1 16 09/09/2021     · Chronic  · Baseline creatinine noted to be 1 0-1 2  · Creatinine on admission noted to be 1 18  · Creatinine stable

## 2021-09-26 NOTE — ASSESSMENT & PLAN NOTE
Lab Results   Component Value Date    HGBA1C 6 6 (H) 04/01/2020       Recent Labs     09/25/21  0737 09/25/21  1057 09/25/21  1512 09/25/21  2106   POCGLU 155* 163* 218* 173*       Blood Sugar Average: Last 72 hrs:    · Chronic; well controlled as evidenced by hemoglobin A1C of 6 6  · Continue home insulin regimen  · SSI  · Monitor blood glucose AC/HS

## 2021-09-26 NOTE — DISCHARGE SUMMARY
[address] [facility]  Discharge- Maggie Fortune 1975, 39 y o  female MRN: [id number]  Unit/Bed#: -01 Encounter: [id number]  Primary Care Provider: No primary care provider on file  Date and time admitted to hospital: 9/21/2021  3:36 PM    * Chest pain  Assessment & Plan  · Presented with significant chest pain in the setting of cystic fibrosis; history of lung transplant x 2 in 2013, 2017  · Typically follows with [facility]  · ED discussed with transplant team and recommended admission here for pain control  · Palliative consulted for pain management, recommendations appreciated  · Patient taken off of Oxy IR; transitioned to PO Dilaudid 9/23  · Dosing increased  yesterday per Palliative Care  · 8 mg PO Dilaudid every 3 hrs prn moderate pain  · 12 mg PO Dilaudid every 3 hrs prn severe pain  · 2 mg IV Dilaudid every 3 hrs prn breakthrough pain  · Continue ATC acetaminophen 975mg Q8H  · Lidocaine patch to the area  · Chest x-ray (9/21/21): Volume loss on the right side unchanged  Stable chronic right lung base opacities  No pneumothorax  · Pt has appointment with outpatient palliative care through [facility] 9/28  · Patient is being  transferred to [facility] for right pneumonectomy tonight      Lung transplant status, bilateral ([facility] [address] )  Assessment & Plan  · Undergone b/l lung transplant 2013, 2017  · C/w suppresive meds    Cystic fibrosis ([facility] [address] )  Assessment & Plan  · Chronic;  · C/w home creon    Chronic kidney disease  Assessment & Plan  Lab Results   Component Value Date    EGFR 51 09/22/2021    EGFR 57 09/21/2021    EGFR 58 09/09/2021    CREATININE 1 28 09/22/2021    CREATININE 1 18 09/21/2021    CREATININE 1 16 09/09/2021     · Chronic  · Baseline creatinine noted to be 1 0-1 2  · Creatinine on admission noted to be 1 18  · Creatinine stable    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, with long-term current use of insulin [facility])  Assessment & Plan  Lab Results Component Value Date    HGBA1C 6 6 (H) 04/01/2020       Recent Labs     09/25/21  0737 09/25/21  1057 09/25/21  1512 09/25/21  2106   POCGLU 155* 163* 218* 173*       Blood Sugar Average: Last 72 hrs:    · Chronic; well controlled as evidenced by hemoglobin A1C of 6 6  · Continue home insulin regimen  · SSI  · Monitor blood glucose AC/HS      Discharging Physician / Practitioner: WAYNE Grace  PCP: No primary care provider on file  Admission Date: 9/21/2021  Discharge Date: 09/26/21    Reason for Admission: Pain With Breathing (Pt reports generalized pain, and pain iwth breathing  Pt of [place], hx of two lung transplants   Was instructed to come here and possibly get transferred  )        Medical Problems     Resolved Problems  Date Reviewed: 9/26/2021        Resolved    Gastroesophageal reflux disease without esophagitis 9/22/2021     Resolved by  Lamont Alan, [address] [facility] Stay:  IP CONSULT TO PALLIATIVE CARE    Procedures Performed:     · None    Significant Findings / Test Results:     · As below  Results from last 7 days   Lab Units 09/22/21  0538 09/21/21  1613   WBC Thousand/uL 6 92 9 57   HEMOGLOBIN g/dL 10 8* 11 9   PLATELETS Thousands/uL 133* 162     Results from last 7 days   Lab Units 09/22/21  0538 09/21/21  1613   SODIUM mmol/L 139 138   POTASSIUM mmol/L 4 5 4 3   CHLORIDE mmol/L 104 102   CO2 mmol/L 27 27   BUN mg/dL 25 22   CREATININE mg/dL 1 28 1 18   CALCIUM mg/dL 8 5 8 7   TOTAL BILIRUBIN mg/dL  --  0 38   ALK PHOS U/L  --  76   ALT U/L  --  35   AST U/L  --  22     Results from last 7 days   Lab Units 09/21/21  1613   INR  1 09     Results from last 7 days   Lab Units 09/21/21  1613   TROPONIN I ng/mL <0 02     Lab Results   Component Value Date/Time    HGBA1C 6 6 (H) 04/01/2020 04:39 AM     Results from last 7 days   Lab Units 09/25/21  2106 09/25/21  1512 09/25/21  1057 09/25/21  0737 09/24/21  1639 09/24/21  0859 09/23/21  2041 09/23/21  1808 09/23/21  0801 09/22/21  2101 09/22/21  1741 09/22/21  1204   POC GLUCOSE mg/dl 173* 218* 163* 155* 260* 175* 240* 250* 171* 309* 201* 169*     Results from last 7 days   Lab Units 09/21/21  1613   LACTIC ACID mmol/L 0 9     Blood Culture:   Lab Results   Component Value Date    BLOODCX No Growth After 5 Days  09/09/2021    BLOODCX No Growth After 5 Days  09/09/2021    BLOODCX No Growth After 5 Days  06/13/2021    BLOODCX No Growth After 5 Days  06/13/2021    BLOODCX No Growth After 5 Days  09/24/2020    BLOODCX No Growth After 5 Days  09/24/2020     Urine Culture:   Lab Results   Component Value Date    URINECX 30,000-39,000 cfu/ml  09/09/2021    URINECX (A) 02/04/2020     30,000-39,000 cfu/ml Staphylococcus coagulase negative    URINECX No Growth <1000 cfu/mL 01/09/2020     Sputum Culture: No components found for: SPUTUMCX  Wound Culture: No results found for: WOUNDCULT     Imaging  XR chest 1 view portable   Final Result by Danae Mcintosh MD (09/21 1618)      Volume loss on the right side unchanged  Stable chronic right lung base opacities  No pneumothorax  Workstation performed: [id number]               Incidental Findings:   · None     Test Results Pending at Discharge (will require follow up): · None     Outpatient Tests Requested:  · Not applicable    Complications:  None    Reason for Admission:  Chest pain    Hospital Course:     PER HPI: Chaparrita Otero is a 39 y o  female patient with a PMH of cystic fibrosis,, status post double lung transplant, CKD, type 2 diabetes, who originally presented to the hospital on 9/21/2021 due to chest pain, in setting of cystic fibrosis with history of double lung transplant  Patient received pain control during her stay and was seen by palliative care  Team discussed case with [facility] where patient received her lung transplant  Patient is being transferred to [facility] for right pneumonectomy       Hospital course:   Please see above list of diagnoses and related plan for additional information  Condition at Discharge: fair       Discharge instructions/Information to patient and family:   See after visit summary for information provided to patient and family  Provisions for Follow-Up Care:  See after visit summary for information related to follow-up care and any pertinent home health orders  Disposition:     Home with VNA Services (Reminder: Complete face to face encounter)    Planned Readmission:  No     Discharge Statement:  I spent 20 minutes discharging the patient  This time was spent on the day of discharge  I had direct contact with the patient on the day of discharge  Greater than 50% of the total time was spent examining patient, answering all patient questions, arranging and discussing plan of care with patient as well as directly providing post-discharge instructions  Additional time then spent on discharge activities  Discharge Medications:  See after visit summary for reconciled discharge medications provided to patient and family        ** Please Note: This note has been constructed using a voice recognition system **

## 2021-09-26 NOTE — PLAN OF CARE
Problem: PAIN - ADULT  Goal: Verbalizes/displays adequate comfort level or baseline comfort level  Description: Interventions:  - Encourage patient to monitor pain and request assistance  - Assess pain using appropriate pain scale  - Administer analgesics based on type and severity of pain and evaluate response  - Implement non-pharmacological measures as appropriate and evaluate response  - Consider cultural and social influences on pain and pain management  - Notify physician/advanced practitioner if interventions unsuccessful or patient reports new pain  Outcome: Progressing     Problem: INFECTION - ADULT  Goal: Absence or prevention of progression during hospitalization  Description: INTERVENTIONS:  - Assess and monitor for signs and symptoms of infection  - Monitor lab/diagnostic results  - Monitor all insertion sites, i e  indwelling lines, tubes, and drains  - Monitor endotracheal if appropriate and nasal secretions for changes in amount and color  - [place] appropriate cooling/warming therapies per order  - Administer medications as ordered  - Instruct and encourage patient and family to use good hand hygiene technique  - Identify and instruct in appropriate isolation precautions for identified infection/condition  Outcome: Progressing  Goal: Absence of fever/infection during neutropenic period  Description: INTERVENTIONS:  - Monitor WBC  Outcome: Progressing     Problem: DISCHARGE PLANNING  Goal: Discharge to home or other facility with appropriate resources  Description: INTERVENTIONS:  - Identify barriers to discharge w/patient and caregiver  - Arrange for needed discharge resources and transportation as appropriate  - Identify discharge learning needs (meds, wound care, etc )  - Arrange for interpretive services to assist at discharge as needed  - Refer to Case Management Department for coordinating discharge planning if the patient needs post-hospital services based on physician/advanced practitioner order or complex needs related to functional status, cognitive ability, or social support system  Outcome: Progressing     Problem: Knowledge Deficit  Goal: Patient/family/caregiver demonstrates understanding of disease process, treatment plan, medications, and discharge instructions  Description: Complete learning assessment and assess knowledge base    Interventions:  - Provide teaching at level of understanding  - Provide teaching via preferred learning methods  Outcome: Progressing     Problem: RESPIRATORY - ADULT  Goal: Achieves optimal ventilation and oxygenation  Description: INTERVENTIONS:  - Assess for changes in respiratory status  - Assess for changes in mentation and behavior  - Position to facilitate oxygenation and minimize respiratory effort  - Oxygen administered by appropriate delivery if ordered  - Initiate smoking cessation education as indicated  - Encourage broncho-pulmonary hygiene including cough, deep breathe, Incentive Spirometry  - Assess the need for suctioning and aspirate as needed  - Assess and instruct to report SOB or any respiratory difficulty  - Respiratory Therapy support as indicated  Outcome: Progressing     Problem: Potential for Falls  Goal: Patient will remain free of falls  Description: INTERVENTIONS:  - Educate patient/family on patient safety including physical limitations  - Instruct patient to call for assistance with activity   - Consult OT/PT to assist with strengthening/mobility   - Keep Call bell within reach  - Keep bed low and locked with side rails adjusted as appropriate  - Keep care items and personal belongings within reach  - Initiate and maintain comfort rounds  - Make Fall Risk Sign visible to staff  - Apply yellow socks and bracelet for high fall risk patients  - Consider moving patient to room near nurses station  Outcome: Progressing     Problem: MOBILITY - ADULT  Goal: Maintain or return to baseline ADL function  Description: INTERVENTIONS:  - Assess patient's ability to carry out ADLs; assess patient's baseline for ADL function and identify physical deficits which impact ability to perform ADLs (bathing, care of mouth/teeth, toileting, grooming, dressing, etc )  - Assess/evaluate cause of self-care deficits   - Assess range of motion  - Assess patient's mobility; develop plan if impaired  - Assess patient's need for assistive devices and provide as appropriate  - Encourage maximum independence but intervene and supervise when necessary  - Involve family in performance of ADLs  - Assess for home care needs following discharge   - Consider OT consult to assist with ADL evaluation and planning for discharge  - Provide patient education as appropriate  Outcome: Progressing  Goal: Maintains/Returns to pre admission functional level  Description: INTERVENTIONS:  - Perform BMAT or MOVE assessment daily    - Set and communicate daily mobility goal to care team and patient/family/caregiver     - Collaborate with rehabilitation services on mobility goals if consulted  - Record patient progress and toleration of activity level   Outcome: Progressing

## 2021-09-28 NOTE — UTILIZATION REVIEW
Notification of Discharge   This is a Notification of Discharge from our facility [address]  Please be advised that this patient has been discharge from our facility  Below you will find the admission and discharge date and time including the patients disposition  UTILIZATION REVIEW CONTACT:  Paul Moreno  Utilization   Network Utilization Review Department  Phone: [phone] x carefully listen to the prompts  All voicemails are confidential   Email: [email or web address]     PHYSICIAN ADVISORY SERVICES:  FOR [id number] REVIEW - MEDICAL NECESSITY DENIAL  Phone: [phone]  Fax: [phone]  Email: [email or web address]     PRESENTATION DATE: 9/21/2021  3:36 PM  OBERVATION ADMISSION DATE: 09/21/2021  INPATIENT ADMISSION DATE: 9/22/21  6:46 PM   DISCHARGE DATE: 9/26/2021  2:30 AM  DISPOSITION: Non [facility] Non [facility]      IMPORTANT INFORMATION:  Send all requests for admission clinical reviews, approved or denied determinations and any other requests to dedicated fax number below belonging to the campus where the patient is receiving treatment   List of dedicated fax numbers:  [address] DENIALS (Administrative/Medical Necessity) [phone]   [address] [place] [address] (Maternity/NICU/Pediatrics) [phone]   Jacob Edwards [phone]   Deja Schaffer [phone]   [facility] [place] [phone]   Bal[facility]er[facility]l Ross [facility] [address] [place] [address] [phone]   [facility]  [phone]   [address] [place] [address], [facility]  [address] [facility] 1000 [facility] [phone]

## 2021-12-02 ENCOUNTER — APPOINTMENT (EMERGENCY)
Dept: RADIOLOGY | Facility: HOSPITAL | Age: 46
End: 2021-12-02
Payer: COMMERCIAL

## 2021-12-02 ENCOUNTER — HOSPITAL ENCOUNTER (EMERGENCY)
Facility: HOSPITAL | Age: 46
Discharge: HOME | End: 2021-12-03
Attending: EMERGENCY MEDICINE
Payer: COMMERCIAL

## 2021-12-02 ENCOUNTER — APPOINTMENT (EMERGENCY)
Dept: CT IMAGING | Facility: HOSPITAL | Age: 46
End: 2021-12-02
Payer: COMMERCIAL

## 2021-12-02 DIAGNOSIS — R50.9 FEVER: Primary | ICD-10-CM

## 2021-12-02 LAB
ALBUMIN SERPL BCP-MCNC: 2.8 G/DL (ref 3.4–4.8)
ALP SERPL-CCNC: 163.4 U/L (ref 35–140)
ALT SERPL W P-5'-P-CCNC: 19 U/L (ref 5–54)
ANION GAP SERPL CALCULATED.3IONS-SCNC: 10 MMOL/L (ref 4–13)
ANISOCYTOSIS BLD QL SMEAR: PRESENT
APTT PPP: 45 SECONDS (ref 23–37)
AST SERPL W P-5'-P-CCNC: 28 U/L (ref 15–41)
BASOPHILS # BLD MANUAL: 0.08 THOUSAND/UL (ref 0–0.1)
BASOPHILS NFR MAR MANUAL: 1 % (ref 0–1)
BILIRUB SERPL-MCNC: 0.34 MG/DL (ref 0.3–1.2)
BUN SERPL-MCNC: 16 MG/DL (ref 6–20)
CALCIUM ALBUM COR SERPL-MCNC: 9.1 MG/DL (ref 8.3–10.1)
CALCIUM SERPL-MCNC: 8.1 MG/DL (ref 8.4–10.2)
CHLORIDE SERPL-SCNC: 99 MMOL/L (ref 96–108)
CO2 SERPL-SCNC: 26 MMOL/L (ref 22–33)
CREAT SERPL-MCNC: 1.35 MG/DL (ref 0.4–1.1)
EOSINOPHIL # BLD MANUAL: 0.08 THOUSAND/UL (ref 0–0.4)
EOSINOPHIL NFR BLD MANUAL: 1 % (ref 0–6)
ERYTHROCYTE [DISTWIDTH] IN BLOOD BY AUTOMATED COUNT: 19.8 % (ref 11.6–15.1)
FLUAV RNA RESP QL NAA+PROBE: NEGATIVE
FLUBV RNA RESP QL NAA+PROBE: NEGATIVE
GFR SERPL CREATININE-BSD FRML MDRD: 48 ML/MIN/1.73SQ M
GLUCOSE SERPL-MCNC: 275 MG/DL (ref 65–140)
HCT VFR BLD AUTO: 28.8 % (ref 34.8–46.1)
HGB BLD-MCNC: 8.1 G/DL (ref 11.5–15.4)
HYPERCHROMIA BLD QL SMEAR: PRESENT
INR PPP: 1.25 (ref 0.84–1.19)
LACTATE SERPL-SCNC: 1.3 MMOL/L (ref 0–2)
LYMPHOCYTES # BLD AUTO: 1.07 THOUSAND/UL (ref 0.6–4.47)
LYMPHOCYTES # BLD AUTO: 13 % (ref 14–44)
MACROCYTES BLD QL AUTO: PRESENT
MCH RBC QN AUTO: 25.7 PG (ref 26.8–34.3)
MCHC RBC AUTO-ENTMCNC: 28.1 G/DL (ref 31.4–37.4)
MCV RBC AUTO: 91 FL (ref 82–98)
MONOCYTES # BLD AUTO: 0.82 THOUSAND/UL (ref 0–1.22)
MONOCYTES NFR BLD: 10 % (ref 4–12)
NEUTROPHILS # BLD MANUAL: 6.17 THOUSAND/UL (ref 1.85–7.62)
NEUTS SEG NFR BLD AUTO: 75 % (ref 43–75)
PLATELET # BLD AUTO: 350 THOUSANDS/UL (ref 149–390)
PLATELET BLD QL SMEAR: ADEQUATE
PMV BLD AUTO: 10.3 FL (ref 8.9–12.7)
POIKILOCYTOSIS BLD QL SMEAR: PRESENT
POTASSIUM SERPL-SCNC: 4.3 MMOL/L (ref 3.5–5)
PROT SERPL-MCNC: 6.9 G/DL (ref 6.4–8.3)
PROTHROMBIN TIME: 15.5 SECONDS (ref 11.6–14.5)
RBC # BLD AUTO: 3.15 MILLION/UL (ref 3.81–5.12)
RBC MORPH BLD: PRESENT
RSV RNA RESP QL NAA+PROBE: NEGATIVE
SARS-COV-2 RNA RESP QL NAA+PROBE: NEGATIVE
SODIUM SERPL-SCNC: 135 MMOL/L (ref 133–145)
WBC # BLD AUTO: 8.23 THOUSAND/UL (ref 4.31–10.16)

## 2021-12-02 PROCEDURE — 71045 X-RAY EXAM CHEST 1 VIEW: CPT

## 2021-12-02 PROCEDURE — G1004 CDSM NDSC: HCPCS

## 2021-12-02 PROCEDURE — 87040 BLOOD CULTURE FOR BACTERIA: CPT | Performed by: EMERGENCY MEDICINE

## 2021-12-02 PROCEDURE — 99285 EMERGENCY DEPT VISIT HI MDM: CPT | Performed by: EMERGENCY MEDICINE

## 2021-12-02 PROCEDURE — 85027 COMPLETE CBC AUTOMATED: CPT | Performed by: EMERGENCY MEDICINE

## 2021-12-02 PROCEDURE — 93005 ELECTROCARDIOGRAM TRACING: CPT

## 2021-12-02 PROCEDURE — 73630 X-RAY EXAM OF FOOT: CPT

## 2021-12-02 PROCEDURE — 0241U HB NFCT DS VIR RESP RNA 4 TRGT: CPT | Performed by: EMERGENCY MEDICINE

## 2021-12-02 PROCEDURE — 99285 EMERGENCY DEPT VISIT HI MDM: CPT

## 2021-12-02 PROCEDURE — 85610 PROTHROMBIN TIME: CPT | Performed by: EMERGENCY MEDICINE

## 2021-12-02 PROCEDURE — 71275 CT ANGIOGRAPHY CHEST: CPT

## 2021-12-02 PROCEDURE — 83605 ASSAY OF LACTIC ACID: CPT | Performed by: EMERGENCY MEDICINE

## 2021-12-02 PROCEDURE — 80053 COMPREHEN METABOLIC PANEL: CPT | Performed by: EMERGENCY MEDICINE

## 2021-12-02 PROCEDURE — 85730 THROMBOPLASTIN TIME PARTIAL: CPT | Performed by: EMERGENCY MEDICINE

## 2021-12-02 PROCEDURE — 85007 BL SMEAR W/DIFF WBC COUNT: CPT | Performed by: EMERGENCY MEDICINE

## 2021-12-02 PROCEDURE — 96374 THER/PROPH/DIAG INJ IV PUSH: CPT

## 2021-12-02 PROCEDURE — 96361 HYDRATE IV INFUSION ADD-ON: CPT

## 2021-12-02 PROCEDURE — 74177 CT ABD & PELVIS W/CONTRAST: CPT

## 2021-12-02 PROCEDURE — 36415 COLL VENOUS BLD VENIPUNCTURE: CPT | Performed by: EMERGENCY MEDICINE

## 2021-12-02 PROCEDURE — 81025 URINE PREGNANCY TEST: CPT | Performed by: EMERGENCY MEDICINE

## 2021-12-02 PROCEDURE — 96376 TX/PRO/DX INJ SAME DRUG ADON: CPT

## 2021-12-02 RX ORDER — HYDROMORPHONE HCL/PF 1 MG/ML
0.5 SYRINGE (ML) INJECTION ONCE
Status: COMPLETED | OUTPATIENT
Start: 2021-12-02 | End: 2021-12-02

## 2021-12-02 RX ORDER — ACETAMINOPHEN 325 MG/1
650 TABLET ORAL ONCE
Status: COMPLETED | OUTPATIENT
Start: 2021-12-02 | End: 2021-12-02

## 2021-12-02 RX ORDER — HYDROMORPHONE HCL/PF 1 MG/ML
1 SYRINGE (ML) INJECTION ONCE
Status: COMPLETED | OUTPATIENT
Start: 2021-12-02 | End: 2021-12-03

## 2021-12-02 RX ADMIN — HYDROMORPHONE HYDROCHLORIDE 0.5 MG: 1 INJECTION, SOLUTION INTRAMUSCULAR; INTRAVENOUS; SUBCUTANEOUS at 22:28

## 2021-12-02 RX ADMIN — HYDROMORPHONE HYDROCHLORIDE 0.5 MG: 1 INJECTION, SOLUTION INTRAMUSCULAR; INTRAVENOUS; SUBCUTANEOUS at 20:49

## 2021-12-02 RX ADMIN — ACETAMINOPHEN 650 MG: 325 TABLET, FILM COATED ORAL at 18:46

## 2021-12-02 RX ADMIN — SODIUM CHLORIDE 500 ML: 0.9 INJECTION, SOLUTION INTRAVENOUS at 20:46

## 2021-12-03 VITALS
BODY MASS INDEX: 18.4 KG/M2 | OXYGEN SATURATION: 97 % | TEMPERATURE: 98.3 F | WEIGHT: 100.6 LBS | HEART RATE: 103 BPM | DIASTOLIC BLOOD PRESSURE: 77 MMHG | RESPIRATION RATE: 18 BRPM | SYSTOLIC BLOOD PRESSURE: 145 MMHG

## 2021-12-03 LAB
ATRIAL RATE: 104 BPM
BACTERIA UR QL AUTO: ABNORMAL /HPF
BILIRUB UR QL STRIP: NEGATIVE
CLARITY UR: CLEAR
COLOR UR: YELLOW
EXT PREG TEST URINE: NEGATIVE
EXT. CONTROL ED NAV: NORMAL
GLUCOSE UR STRIP-MCNC: NEGATIVE MG/DL
HGB UR QL STRIP.AUTO: ABNORMAL
KETONES UR STRIP-MCNC: NEGATIVE MG/DL
LEUKOCYTE ESTERASE UR QL STRIP: NEGATIVE
NITRITE UR QL STRIP: NEGATIVE
NON-SQ EPI CELLS URNS QL MICRO: ABNORMAL /HPF
P AXIS: 54 DEGREES
PH UR STRIP.AUTO: 6 [PH]
PR INTERVAL: 141 MS
PROT UR STRIP-MCNC: NEGATIVE MG/DL
QRS AXIS: 56 DEGREES
QRSD INTERVAL: 75 MS
QT INTERVAL: 339 MS
QTC INTERVAL: 446 MS
RBC #/AREA URNS AUTO: ABNORMAL /HPF
SP GR UR STRIP.AUTO: 1.01 (ref 1–1.03)
T WAVE AXIS: 22 DEGREES
UROBILINOGEN UR QL STRIP.AUTO: 0.2 E.U./DL
VENTRICULAR RATE: 104 BPM
WBC #/AREA URNS AUTO: ABNORMAL /HPF

## 2021-12-03 PROCEDURE — 81003 URINALYSIS AUTO W/O SCOPE: CPT | Performed by: EMERGENCY MEDICINE

## 2021-12-03 PROCEDURE — 81001 URINALYSIS AUTO W/SCOPE: CPT | Performed by: EMERGENCY MEDICINE

## 2021-12-03 PROCEDURE — 93010 ELECTROCARDIOGRAM REPORT: CPT | Performed by: INTERNAL MEDICINE

## 2021-12-03 PROCEDURE — 96376 TX/PRO/DX INJ SAME DRUG ADON: CPT

## 2021-12-03 RX ORDER — TACROLIMUS 1 MG/1
3 CAPSULE ORAL EVERY 12 HOURS SCHEDULED
Status: DISCONTINUED | OUTPATIENT
Start: 2021-12-03 | End: 2021-12-03 | Stop reason: HOSPADM

## 2021-12-03 RX ORDER — PREGABALIN 75 MG/1
75 CAPSULE ORAL ONCE
Status: COMPLETED | OUTPATIENT
Start: 2021-12-03 | End: 2021-12-03

## 2021-12-03 RX ORDER — HYDROMORPHONE HCL/PF 1 MG/ML
1 SYRINGE (ML) INJECTION ONCE
Status: COMPLETED | OUTPATIENT
Start: 2021-12-03 | End: 2021-12-03

## 2021-12-03 RX ADMIN — TACROLIMUS 3 MG: 1 CAPSULE ORAL at 02:21

## 2021-12-03 RX ADMIN — HYDROMORPHONE HYDROCHLORIDE 1 MG: 1 INJECTION, SOLUTION INTRAMUSCULAR; INTRAVENOUS; SUBCUTANEOUS at 00:01

## 2021-12-03 RX ADMIN — PREGABALIN 75 MG: 75 CAPSULE ORAL at 01:07

## 2021-12-03 RX ADMIN — HYDROMORPHONE HYDROCHLORIDE 1 MG: 1 INJECTION, SOLUTION INTRAMUSCULAR; INTRAVENOUS; SUBCUTANEOUS at 02:22

## 2021-12-03 RX ADMIN — IOHEXOL 100 ML: 350 INJECTION, SOLUTION INTRAVENOUS at 01:57

## 2021-12-03 RX ADMIN — PREGABALIN 75 MG: 75 CAPSULE ORAL at 02:22

## 2021-12-03 RX ADMIN — APIXABAN 5 MG: 5 TABLET, FILM COATED ORAL at 01:07

## 2021-12-08 LAB
BACTERIA BLD CULT: NORMAL
BACTERIA BLD CULT: NORMAL

## 2021-12-10 ENCOUNTER — TELEPHONE (OUTPATIENT)
Dept: INTERNAL MEDICINE CLINIC | Facility: CLINIC | Age: 46
End: 2021-12-10

## 2021-12-10 DIAGNOSIS — M25.569 ACUTE KNEE PAIN, UNSPECIFIED LATERALITY: Primary | ICD-10-CM

## 2021-12-10 RX ORDER — CAPSAICIN 0.07 G/100G
CREAM TOPICAL 3 TIMES DAILY
Qty: 28.3 G | Refills: 0 | Status: SHIPPED | OUTPATIENT
Start: 2021-12-10 | End: 2022-03-22 | Stop reason: ALTCHOICE

## 2021-12-10 NOTE — TELEPHONE ENCOUNTER
Rosalba from [facility] called the office today  She wanted to let you know that [facility] Is having severe pain in her foot  She is taking lyrica 150mg every 8 hours for the pain and it is not relieving it  She had an x-ray done and it was all normal  She cannot take gabapentin due to an allergy  She stated the pain in worse when her knee is straight and she is pointing her foot up  They were wondering if she could try another medication or the dosage on the lyrica could be upped

## 2021-12-22 DIAGNOSIS — R29.898 WEAKNESS OF RIGHT LOWER EXTREMITY: Primary | ICD-10-CM

## 2022-01-03 ENCOUNTER — PATIENT MESSAGE (OUTPATIENT)
Dept: INTERNAL MEDICINE CLINIC | Facility: CLINIC | Age: 47
End: 2022-01-03

## 2022-01-03 ENCOUNTER — TELEPHONE (OUTPATIENT)
Dept: FAMILY MEDICINE CLINIC | Facility: CLINIC | Age: 47
End: 2022-01-03

## 2022-01-03 DIAGNOSIS — E84.9 CYSTIC FIBROSIS (HCC): ICD-10-CM

## 2022-01-03 RX ORDER — ALPRAZOLAM 1 MG/1
1 TABLET ORAL 2 TIMES DAILY
Qty: 60 TABLET | Refills: 1 | Status: SHIPPED | OUTPATIENT
Start: 2022-01-03 | End: 2022-03-23

## 2022-01-03 NOTE — TELEPHONE ENCOUNTER
Patient  called to follow up on referral from 12/22 I do not see that the patient has been called yet  I did see a note stating she could scheduled from the review of the referral from 12/23  I left them a message for follow up on the referral and stressed that patient is experiencing a significant amount of discomfort

## 2022-01-03 NOTE — TELEPHONE ENCOUNTER
From: Rajinder Martinez  To: Kamini Boyce MD  Sent: 1/3/2022 9:59 AM EST  Subject: Xanax Jameel Pérez,     Can you please call in a new Xanax prescription to my local [facility] pharmacy? This used to be taken care of by Dr Hallie Dow but he is no longer my primary care doctor  Thank you in advance and let me know if you have any questions      Thanks,   E  JAIME Jenkins

## 2022-01-05 NOTE — TELEPHONE ENCOUNTER
I called to neuro today and left a message at the [facility] to see if we can get this patient who is having significant pain scheduled  Awaiting call back or call to the patient

## 2022-01-06 ENCOUNTER — TELEPHONE (OUTPATIENT)
Dept: NEUROLOGY | Facility: CLINIC | Age: 47
End: 2022-01-06

## 2022-01-06 NOTE — TELEPHONE ENCOUNTER
ulisesom today to call our office to try and make an appt for general neuro  There is a referral in her chart        ----- Message from Dyllan Eastman RN sent at 1/5/2022 12:39 PM EST -----  I have been trying to get this patient in for a neuro appt  She was released from [facility] about a month ago after a pneumonectomy  She it was felt due to being on bedrest for quite some time that she developed foot pain to one side  The pain is still persisting and I was hoping we can get her in for an appointment for evaluation  She is having home PT   Please let me know if this is a possibility  She lives in [facility] area   Thank you

## 2022-01-07 ENCOUNTER — HOSPITAL ENCOUNTER (INPATIENT)
Facility: HOSPITAL | Age: 47
LOS: 7 days | Discharge: HOME HEALTH | DRG: 314 | End: 2022-01-14
Attending: EMERGENCY MEDICINE | Admitting: HOSPITALIST
Payer: COMMERCIAL

## 2022-01-07 ENCOUNTER — APPOINTMENT (EMERGENCY)
Dept: RADIOLOGY | Facility: HOSPITAL | Age: 47
DRG: 314 | End: 2022-01-07
Payer: COMMERCIAL

## 2022-01-07 ENCOUNTER — APPOINTMENT (EMERGENCY)
Dept: CT IMAGING | Facility: HOSPITAL | Age: 47
DRG: 314 | End: 2022-01-07
Payer: COMMERCIAL

## 2022-01-07 DIAGNOSIS — R65.10 SIRS (SYSTEMIC INFLAMMATORY RESPONSE SYNDROME) (HCC): ICD-10-CM

## 2022-01-07 DIAGNOSIS — R78.81 GRAM-POSITIVE COCCI BACTEREMIA: ICD-10-CM

## 2022-01-07 DIAGNOSIS — R62.7 FTT (FAILURE TO THRIVE) IN ADULT: ICD-10-CM

## 2022-01-07 DIAGNOSIS — D72.819 LEUKOPENIA: ICD-10-CM

## 2022-01-07 DIAGNOSIS — D84.821 IMMUNOCOMPROMISED STATE DUE TO DRUG THERAPY (HCC): ICD-10-CM

## 2022-01-07 DIAGNOSIS — Z79.899 IMMUNOCOMPROMISED STATE DUE TO DRUG THERAPY (HCC): ICD-10-CM

## 2022-01-07 DIAGNOSIS — R78.81 GRAM-POSITIVE BACTEREMIA: ICD-10-CM

## 2022-01-07 DIAGNOSIS — J18.9 RECURRENT PNEUMONIA: ICD-10-CM

## 2022-01-07 DIAGNOSIS — R50.9 FEVER, UNKNOWN ORIGIN: Primary | ICD-10-CM

## 2022-01-07 DIAGNOSIS — M79.2 NEUROPATHIC PAIN OF RIGHT FOOT: ICD-10-CM

## 2022-01-07 DIAGNOSIS — R29.898 WEAKNESS OF RIGHT LOWER EXTREMITY: ICD-10-CM

## 2022-01-07 DIAGNOSIS — M79.2 NEUROPATHIC PAIN: ICD-10-CM

## 2022-01-07 DIAGNOSIS — R50.9 FEVER: ICD-10-CM

## 2022-01-07 DIAGNOSIS — E83.42 HYPOMAGNESEMIA: ICD-10-CM

## 2022-01-07 DIAGNOSIS — E84.9 CYSTIC FIBROSIS (HCC): ICD-10-CM

## 2022-01-07 DIAGNOSIS — I95.9 HYPOTENSION: ICD-10-CM

## 2022-01-07 PROBLEM — A41.9 SEPSIS DUE TO PNEUMONIA (HCC): Status: ACTIVE | Noted: 2021-09-07

## 2022-01-07 LAB
2HR DELTA HS TROPONIN: 1 NG/L
ALBUMIN SERPL BCP-MCNC: 2.5 G/DL (ref 3.5–5)
ALP SERPL-CCNC: 283 U/L (ref 46–116)
ALT SERPL W P-5'-P-CCNC: 160 U/L (ref 12–78)
ANION GAP SERPL CALCULATED.3IONS-SCNC: 8 MMOL/L (ref 4–13)
APTT PPP: 28 SECONDS (ref 23–37)
AST SERPL W P-5'-P-CCNC: 59 U/L (ref 5–45)
BASOPHILS # BLD AUTO: 0.02 THOUSANDS/ΜL (ref 0–0.1)
BASOPHILS NFR BLD AUTO: 1 % (ref 0–1)
BILIRUB DIRECT SERPL-MCNC: 0.1 MG/DL (ref 0–0.2)
BILIRUB SERPL-MCNC: 0.39 MG/DL (ref 0.2–1)
BUN SERPL-MCNC: 22 MG/DL (ref 5–25)
CALCIUM SERPL-MCNC: 7.9 MG/DL (ref 8.3–10.1)
CARDIAC TROPONIN I PNL SERPL HS: 10 NG/L
CARDIAC TROPONIN I PNL SERPL HS: 9 NG/L
CHLORIDE SERPL-SCNC: 103 MMOL/L (ref 100–108)
CO2 SERPL-SCNC: 29 MMOL/L (ref 21–32)
CREAT SERPL-MCNC: 1.1 MG/DL (ref 0.6–1.3)
EOSINOPHIL # BLD AUTO: 0.04 THOUSAND/ΜL (ref 0–0.61)
EOSINOPHIL NFR BLD AUTO: 1 % (ref 0–6)
ERYTHROCYTE [DISTWIDTH] IN BLOOD BY AUTOMATED COUNT: 16.6 % (ref 11.6–15.1)
FLUAV RNA RESP QL NAA+PROBE: NEGATIVE
FLUBV RNA RESP QL NAA+PROBE: NEGATIVE
GFR SERPL CREATININE-BSD FRML MDRD: 61 ML/MIN/1.73SQ M
GLUCOSE SERPL-MCNC: 122 MG/DL (ref 65–140)
HCG SERPL QL: NEGATIVE
HCT VFR BLD AUTO: 28.9 % (ref 34.8–46.1)
HGB BLD-MCNC: 8.1 G/DL (ref 11.5–15.4)
IMM GRANULOCYTES # BLD AUTO: 0.01 THOUSAND/UL (ref 0–0.2)
IMM GRANULOCYTES NFR BLD AUTO: 0 % (ref 0–2)
INR PPP: 1.07 (ref 0.84–1.19)
LACTATE SERPL-SCNC: 1.6 MMOL/L (ref 0.5–2)
LIPASE SERPL-CCNC: <10 U/L (ref 73–393)
LYMPHOCYTES # BLD AUTO: 0.65 THOUSANDS/ΜL (ref 0.6–4.47)
LYMPHOCYTES NFR BLD AUTO: 22 % (ref 14–44)
MAGNESIUM SERPL-MCNC: 1.2 MG/DL (ref 1.6–2.6)
MCH RBC QN AUTO: 22.5 PG (ref 26.8–34.3)
MCHC RBC AUTO-ENTMCNC: 28 G/DL (ref 31.4–37.4)
MCV RBC AUTO: 80 FL (ref 82–98)
MONOCYTES # BLD AUTO: 0.33 THOUSAND/ΜL (ref 0.17–1.22)
MONOCYTES NFR BLD AUTO: 11 % (ref 4–12)
NEUTROPHILS # BLD AUTO: 1.89 THOUSANDS/ΜL (ref 1.85–7.62)
NEUTS SEG NFR BLD AUTO: 65 % (ref 43–75)
NRBC BLD AUTO-RTO: 0 /100 WBCS
PLATELET # BLD AUTO: 169 THOUSANDS/UL (ref 149–390)
PMV BLD AUTO: 10.7 FL (ref 8.9–12.7)
POTASSIUM SERPL-SCNC: 3.8 MMOL/L (ref 3.5–5.3)
PROCALCITONIN SERPL-MCNC: 0.21 NG/ML
PROT SERPL-MCNC: 6.8 G/DL (ref 6.4–8.2)
PROTHROMBIN TIME: 13.4 SECONDS (ref 11.6–14.5)
RBC # BLD AUTO: 3.6 MILLION/UL (ref 3.81–5.12)
RSV RNA RESP QL NAA+PROBE: NEGATIVE
SARS-COV-2 RNA RESP QL NAA+PROBE: NEGATIVE
SODIUM SERPL-SCNC: 140 MMOL/L (ref 136–145)
WBC # BLD AUTO: 2.94 THOUSAND/UL (ref 4.31–10.16)

## 2022-01-07 PROCEDURE — 74176 CT ABD & PELVIS W/O CONTRAST: CPT

## 2022-01-07 PROCEDURE — 99223 1ST HOSP IP/OBS HIGH 75: CPT | Performed by: HOSPITALIST

## 2022-01-07 PROCEDURE — 83605 ASSAY OF LACTIC ACID: CPT | Performed by: EMERGENCY MEDICINE

## 2022-01-07 PROCEDURE — 80048 BASIC METABOLIC PNL TOTAL CA: CPT | Performed by: EMERGENCY MEDICINE

## 2022-01-07 PROCEDURE — 96368 THER/DIAG CONCURRENT INF: CPT

## 2022-01-07 PROCEDURE — 87186 SC STD MICRODIL/AGAR DIL: CPT | Performed by: EMERGENCY MEDICINE

## 2022-01-07 PROCEDURE — 84703 CHORIONIC GONADOTROPIN ASSAY: CPT | Performed by: EMERGENCY MEDICINE

## 2022-01-07 PROCEDURE — 85730 THROMBOPLASTIN TIME PARTIAL: CPT | Performed by: EMERGENCY MEDICINE

## 2022-01-07 PROCEDURE — 84484 ASSAY OF TROPONIN QUANT: CPT | Performed by: EMERGENCY MEDICINE

## 2022-01-07 PROCEDURE — 80076 HEPATIC FUNCTION PANEL: CPT | Performed by: EMERGENCY MEDICINE

## 2022-01-07 PROCEDURE — 71250 CT THORAX DX C-: CPT

## 2022-01-07 PROCEDURE — 83735 ASSAY OF MAGNESIUM: CPT | Performed by: EMERGENCY MEDICINE

## 2022-01-07 PROCEDURE — G1004 CDSM NDSC: HCPCS

## 2022-01-07 PROCEDURE — 99285 EMERGENCY DEPT VISIT HI MDM: CPT

## 2022-01-07 PROCEDURE — 83690 ASSAY OF LIPASE: CPT | Performed by: EMERGENCY MEDICINE

## 2022-01-07 PROCEDURE — 71045 X-RAY EXAM CHEST 1 VIEW: CPT

## 2022-01-07 PROCEDURE — 96366 THER/PROPH/DIAG IV INF ADDON: CPT

## 2022-01-07 PROCEDURE — 96375 TX/PRO/DX INJ NEW DRUG ADDON: CPT

## 2022-01-07 PROCEDURE — 96361 HYDRATE IV INFUSION ADD-ON: CPT

## 2022-01-07 PROCEDURE — 85025 COMPLETE CBC W/AUTO DIFF WBC: CPT | Performed by: EMERGENCY MEDICINE

## 2022-01-07 PROCEDURE — 87077 CULTURE AEROBIC IDENTIFY: CPT | Performed by: EMERGENCY MEDICINE

## 2022-01-07 PROCEDURE — 85610 PROTHROMBIN TIME: CPT | Performed by: EMERGENCY MEDICINE

## 2022-01-07 PROCEDURE — 84145 PROCALCITONIN (PCT): CPT | Performed by: EMERGENCY MEDICINE

## 2022-01-07 PROCEDURE — 87040 BLOOD CULTURE FOR BACTERIA: CPT | Performed by: EMERGENCY MEDICINE

## 2022-01-07 PROCEDURE — 0241U HB NFCT DS VIR RESP RNA 4 TRGT: CPT | Performed by: EMERGENCY MEDICINE

## 2022-01-07 PROCEDURE — 99285 EMERGENCY DEPT VISIT HI MDM: CPT | Performed by: EMERGENCY MEDICINE

## 2022-01-07 PROCEDURE — 96365 THER/PROPH/DIAG IV INF INIT: CPT

## 2022-01-07 PROCEDURE — 36415 COLL VENOUS BLD VENIPUNCTURE: CPT | Performed by: EMERGENCY MEDICINE

## 2022-01-07 PROCEDURE — 93005 ELECTROCARDIOGRAM TRACING: CPT

## 2022-01-07 RX ORDER — MAGNESIUM HYDROXIDE/ALUMINUM HYDROXICE/SIMETHICONE 120; 1200; 1200 MG/30ML; MG/30ML; MG/30ML
30 SUSPENSION ORAL ONCE
Status: COMPLETED | OUTPATIENT
Start: 2022-01-07 | End: 2022-01-07

## 2022-01-07 RX ORDER — LIDOCAINE 50 MG/G
2 PATCH TOPICAL DAILY
Status: DISCONTINUED | OUTPATIENT
Start: 2022-01-08 | End: 2022-01-14 | Stop reason: HOSPADM

## 2022-01-07 RX ORDER — ONDANSETRON 2 MG/ML
4 INJECTION INTRAMUSCULAR; INTRAVENOUS ONCE
Status: COMPLETED | OUTPATIENT
Start: 2022-01-07 | End: 2022-01-07

## 2022-01-07 RX ORDER — PANTOPRAZOLE SODIUM 40 MG/1
40 TABLET, DELAYED RELEASE ORAL
Status: DISCONTINUED | OUTPATIENT
Start: 2022-01-08 | End: 2022-01-14 | Stop reason: HOSPADM

## 2022-01-07 RX ORDER — FENTANYL CITRATE 50 UG/ML
50 INJECTION, SOLUTION INTRAMUSCULAR; INTRAVENOUS ONCE
Status: COMPLETED | OUTPATIENT
Start: 2022-01-07 | End: 2022-01-07

## 2022-01-07 RX ORDER — SODIUM CHLORIDE, SODIUM GLUCONATE, SODIUM ACETATE, POTASSIUM CHLORIDE, MAGNESIUM CHLORIDE, SODIUM PHOSPHATE, DIBASIC, AND POTASSIUM PHOSPHATE .53; .5; .37; .037; .03; .012; .00082 G/100ML; G/100ML; G/100ML; G/100ML; G/100ML; G/100ML; G/100ML
1000 INJECTION, SOLUTION INTRAVENOUS ONCE
Status: COMPLETED | OUTPATIENT
Start: 2022-01-07 | End: 2022-01-07

## 2022-01-07 RX ORDER — MAGNESIUM SULFATE HEPTAHYDRATE 40 MG/ML
2 INJECTION, SOLUTION INTRAVENOUS ONCE
Status: COMPLETED | OUTPATIENT
Start: 2022-01-07 | End: 2022-01-07

## 2022-01-07 RX ORDER — KETOROLAC TROMETHAMINE 30 MG/ML
15 INJECTION, SOLUTION INTRAMUSCULAR; INTRAVENOUS ONCE
Status: COMPLETED | OUTPATIENT
Start: 2022-01-07 | End: 2022-01-07

## 2022-01-07 RX ORDER — SODIUM CHLORIDE, SODIUM GLUCONATE, SODIUM ACETATE, POTASSIUM CHLORIDE, MAGNESIUM CHLORIDE, SODIUM PHOSPHATE, DIBASIC, AND POTASSIUM PHOSPHATE .53; .5; .37; .037; .03; .012; .00082 G/100ML; G/100ML; G/100ML; G/100ML; G/100ML; G/100ML; G/100ML
100 INJECTION, SOLUTION INTRAVENOUS CONTINUOUS
Status: DISCONTINUED | OUTPATIENT
Start: 2022-01-07 | End: 2022-01-08

## 2022-01-07 RX ORDER — ALPRAZOLAM 0.5 MG/1
1 TABLET ORAL 2 TIMES DAILY
Status: DISCONTINUED | OUTPATIENT
Start: 2022-01-07 | End: 2022-01-14 | Stop reason: HOSPADM

## 2022-01-07 RX ORDER — PREDNISONE 10 MG/1
10 TABLET ORAL DAILY
Status: CANCELLED | OUTPATIENT
Start: 2022-01-08

## 2022-01-07 RX ORDER — ATOVAQUONE 750 MG/5ML
1500 SUSPENSION ORAL DAILY
Status: DISCONTINUED | OUTPATIENT
Start: 2022-01-08 | End: 2022-01-14 | Stop reason: HOSPADM

## 2022-01-07 RX ORDER — POLYETHYLENE GLYCOL 3350 17 G/17G
17 POWDER, FOR SOLUTION ORAL 2 TIMES DAILY PRN
Status: DISCONTINUED | OUTPATIENT
Start: 2022-01-07 | End: 2022-01-14 | Stop reason: HOSPADM

## 2022-01-07 RX ORDER — PREGABALIN 75 MG/1
150 CAPSULE ORAL 3 TIMES DAILY
Status: DISCONTINUED | OUTPATIENT
Start: 2022-01-07 | End: 2022-01-10

## 2022-01-07 RX ORDER — LIDOCAINE 50 MG/G
1 PATCH TOPICAL ONCE
Status: COMPLETED | OUTPATIENT
Start: 2022-01-07 | End: 2022-01-08

## 2022-01-07 RX ORDER — TRAZODONE HYDROCHLORIDE 50 MG/1
25 TABLET ORAL
Status: DISCONTINUED | OUTPATIENT
Start: 2022-01-07 | End: 2022-01-14 | Stop reason: HOSPADM

## 2022-01-07 RX ORDER — ACETAMINOPHEN 325 MG/1
650 TABLET ORAL EVERY 4 HOURS PRN
Status: DISCONTINUED | OUTPATIENT
Start: 2022-01-07 | End: 2022-01-14 | Stop reason: HOSPADM

## 2022-01-07 RX ORDER — TACROLIMUS 0.5 MG/1
3 CAPSULE ORAL 2 TIMES DAILY
Status: DISCONTINUED | OUTPATIENT
Start: 2022-01-07 | End: 2022-01-14 | Stop reason: HOSPADM

## 2022-01-07 RX ORDER — ACETAMINOPHEN 325 MG/1
650 TABLET ORAL ONCE
Status: COMPLETED | OUTPATIENT
Start: 2022-01-07 | End: 2022-01-07

## 2022-01-07 RX ADMIN — MORPHINE SULFATE 2 MG: 2 INJECTION, SOLUTION INTRAMUSCULAR; INTRAVENOUS at 20:47

## 2022-01-07 RX ADMIN — PIPERACILLIN AND TAZOBACTAM 4.5 G: 4; .5 INJECTION, POWDER, LYOPHILIZED, FOR SOLUTION INTRAVENOUS at 17:33

## 2022-01-07 RX ADMIN — SODIUM CHLORIDE, SODIUM GLUCONATE, SODIUM ACETATE, POTASSIUM CHLORIDE, MAGNESIUM CHLORIDE, SODIUM PHOSPHATE, DIBASIC, AND POTASSIUM PHOSPHATE 1000 ML: .53; .5; .37; .037; .03; .012; .00082 INJECTION, SOLUTION INTRAVENOUS at 11:03

## 2022-01-07 RX ADMIN — APIXABAN 5 MG: 5 TABLET, FILM COATED ORAL at 20:49

## 2022-01-07 RX ADMIN — ACETAMINOPHEN 650 MG: 325 TABLET, FILM COATED ORAL at 17:27

## 2022-01-07 RX ADMIN — LIDOCAINE 5% 1 PATCH: 700 PATCH TOPICAL at 12:01

## 2022-01-07 RX ADMIN — PREGABALIN 150 MG: 75 CAPSULE ORAL at 20:49

## 2022-01-07 RX ADMIN — HYDROCORTISONE SODIUM SUCCINATE 50 MG: 100 INJECTION, POWDER, FOR SOLUTION INTRAMUSCULAR; INTRAVENOUS at 21:44

## 2022-01-07 RX ADMIN — ONDANSETRON 4 MG: 2 INJECTION INTRAMUSCULAR; INTRAVENOUS at 09:25

## 2022-01-07 RX ADMIN — PIPERACILLIN AND TAZOBACTAM 4.5 G: 4; .5 INJECTION, POWDER, LYOPHILIZED, FOR SOLUTION INTRAVENOUS at 11:05

## 2022-01-07 RX ADMIN — SODIUM CHLORIDE 1000 ML: 0.9 INJECTION, SOLUTION INTRAVENOUS at 09:21

## 2022-01-07 RX ADMIN — TACROLIMUS 3 MG: 0.5 CAPSULE ORAL at 21:44

## 2022-01-07 RX ADMIN — MAGNESIUM SULFATE HEPTAHYDRATE 2 G: 40 INJECTION, SOLUTION INTRAVENOUS at 12:01

## 2022-01-07 RX ADMIN — SODIUM CHLORIDE, SODIUM GLUCONATE, SODIUM ACETATE, POTASSIUM CHLORIDE, MAGNESIUM CHLORIDE, SODIUM PHOSPHATE, DIBASIC, AND POTASSIUM PHOSPHATE 1000 ML: .53; .5; .37; .037; .03; .012; .00082 INJECTION, SOLUTION INTRAVENOUS at 13:17

## 2022-01-07 RX ADMIN — FENTANYL CITRATE 50 MCG: 50 INJECTION INTRAMUSCULAR; INTRAVENOUS at 17:27

## 2022-01-07 RX ADMIN — KETOROLAC TROMETHAMINE 15 MG: 30 INJECTION, SOLUTION INTRAMUSCULAR at 10:18

## 2022-01-07 RX ADMIN — SODIUM CHLORIDE, SODIUM GLUCONATE, SODIUM ACETATE, POTASSIUM CHLORIDE, MAGNESIUM CHLORIDE, SODIUM PHOSPHATE, DIBASIC, AND POTASSIUM PHOSPHATE 125 ML/HR: .53; .5; .37; .037; .03; .012; .00082 INJECTION, SOLUTION INTRAVENOUS at 14:48

## 2022-01-07 RX ADMIN — ALUMINUM HYDROXIDE, MAGNESIUM HYDROXIDE, AND SIMETHICONE 30 ML: 200; 200; 20 SUSPENSION ORAL at 09:24

## 2022-01-07 RX ADMIN — PANCRELIPASE 24000 UNITS: 24000; 76000; 120000 CAPSULE, DELAYED RELEASE PELLETS ORAL at 20:48

## 2022-01-07 NOTE — ED PROVIDER NOTES
History  Chief Complaint   Patient presents with    Flu Symptoms     fever since last night, 103 2 at home  Pt hx of lung transplant      Patient is a 81-year-old female with past medical and surgical history significant for cystic fibrosis status post lung transplant in 2013 and 2017, currently on immunosuppressive therapy, status post right pneumonectomy in late September 2021 at [facility] where she follows with her transplant team, history of allergic bronchopulmonary aspergillosis, prior DVT on Eliquis, hypertension, diabetes, GERD, sciatica and chronic pain, cholecystectomy, appendectomy, presents to the emergency department by ambulance for high fever  Patient reports she started having chills last night and this morning she took her temperature and it was 106° F  She took 2 Tylenol 1 hour ago  She reports generalized weakness, mild dry cough and mild dyspnea but attributes the dyspnea to stress and anxiety  She reports heartburn and burping denies any vomiting or other type of chest pain  Denies any headache, dizziness or near syncope, neck pain or stiffness, URI symptoms or sore throat, hemoptysis, abdominal pain, vomiting, diarrhea, constipation, blood per rectum or melena, dysuria, change in frequency, hematuria, flank pain, skin rash or color change, new extremity weakness or paresthesia or other focal neurologic deficits  Patient does report ever since her pneumonectomy in late September/early October, she has had exacerbated sciatica pain and paresthesia in the right leg and foot  Denies new weakness but she does report she walks with a walker due to this  Denies any known exposure to COVID  Patient fully vaccinated and boosted against COVID        History provided by:  Patient and EMS personnel   used: No    Flu Symptoms  Presenting symptoms: cough, fatigue and fever    Presenting symptoms: no diarrhea, no headaches, no nausea, no rhinorrhea, no shortness of breath, no sore throat and no vomiting    Associated symptoms: chills    Associated symptoms: no ear pain, no congestion and no neck stiffness        Prior to Admission Medications   Prescriptions Last Dose Informant Patient Reported? Taking?    ALPRAZolam (XANAX) 1 mg tablet   No No   Sig: Take 1 tablet (1 mg total) by mouth 2 (two) times a day   Respiratory Therapy Supplies (Nebulizer) SUKHI   Yes No   Sig: Please dispense nebulizer machine   SYRINGE-NEEDLE, DISP, 3 ML 23G X 1" 3 ML MISC   Yes No   Sig: Use twice daily to draw up acetylcysteine   acetaminophen (TYLENOL) 325 mg tablet   No No   Sig: Take 3 tablets (975 mg total) by mouth every 8 (eight) hours   acetylcysteine (MUCOMYST) 200 mg/mL nebulizer solution   No No   Sig: Take 2 mL (400 mg total) by nebulization 2 (two) times a day   albuterol (2 5 mg/3 mL) 0 083 % nebulizer solution   No No   Sig: Take 6 mL (5 mg total) by nebulization 2 (two) times a day   apixaban (ELIQUIS) 5 mg   No No   Sig: Take 1 tablet (5 mg total) by mouth 2 (two) times a day   atovaquone (MEPRON) 750 mg/5 mL suspension  Self Yes No   Sig: Take 1,500 mg by mouth daily   capsicum (ZOSTRIX) 0 075 % topical cream   No No   Sig: Apply topically 3 (three) times a day   dronabinol (MARINOL) 5 MG capsule   No No   Sig: TAKE 1 CAPSULE (5 MG TOTAL) BY MOUTH 3 (THREE) TIMES A DAY   famotidine (PEPCID) 20 mg tablet   Yes No   Sig: Take 20 mg by mouth 2 (two) times a day   insulin lispro (HumaLOG) 100 units/mL injection   No No   Sig: Inject 4 Units under the skin 3 (three) times a day with meals   lidocaine (LIDODERM) 5 %   No No   Sig: Apply 2 patches topically daily Remove & Discard patch within 12 hours or as directed by MD   oxyCODONE (ROXICODONE) 10 MG TABS   Yes No   Sig: Take 10 mg by mouth   pancrelipase, Lip-Prot-Amyl, (CREON) 24,000 units  Self Yes No   Sig: Take 48,000 Units by mouth 3 (three) times a day with meals   pantoprazole (PROTONIX) 40 mg tablet   No No   Sig: Take 1 tablet (40 mg total) by mouth daily in the early morning   polyethylene glycol (MIRALAX) 17 g packet  Self Yes No   Sig: Take 17 g by mouth 2 (two) times a day as needed   predniSONE 10 mg tablet   Yes No   Sig: Take 10 mg by mouth daily   pregabalin (LYRICA) 75 mg capsule   Yes No   Sig: Take 150 mg by mouth Three times a day     sodium chloride 3 % inhalation solution   Yes No   Sig: Inhale 4 mL 2 (two) times a day   tacrolimus (PROGRAF) 1 mg capsule   Yes No   Sig: Take 3 mg by mouth 2 (two) times a day   traZODone (DESYREL) 50 mg tablet   No No   Sig: Take 0 5 tablets (25 mg total) by mouth daily at bedtime   valACYclovir (VALTREX) 500 mg tablet   Yes No   Sig: Take 500 mg by mouth daily      Facility-Administered Medications: None       Past Medical History:   Diagnosis Date    ABPA (allergic bronchopulmonary aspergillosis) ([facility] ) 7/8/2012    Acute kidney injury ([facility] ) 9/8/2017    Acute on chronic respiratory failure ([facility] ) 3/4/2017    Last Assessment & Plan:  Formatting of this note might be different from the original  Recurrent with clinical worsening with enlarging PTX, s/p multiple chest tubes, but new fevers 6/19 and worsening leukocytosis indicating infection as cause of 6/19 decompensation with profound hypoxia in setting of shunting through right lung and continued pneumothorax s/p 2nd chest tube placement    6/27: Right    Asthma     Chronic pain     C[facility]     [facility]     GERD (gastroesophageal reflux disease)     HBP (high blood pressure)     HCAP (healthcare-associated pneumonia) 3/6/2020    Influenza B 2/23/2020    Kidney stone     Neutropenic fever ([facility] ) 9/26/2020    PONV (postoperative nausea and vomiting)     Pseudomonas aeruginosa infection 9/20/2012    Transplant recipient     Lung- 2013, 2017       Past Surgical History:   Procedure Laterality Date    APPENDECTOMY      BREAST SURGERY      Aug      BRONCHOSCOPY      CHOLECYSTECTOMY      COLONOSCOPY      EGD  07/2019 Yeast Infect   FL RETROGRADE PYELOGRAM  1/9/2020    FL RETROGRADE PYELOGRAM  2/4/2020    GASTROSTOMY TUBE, PLACE      LUNG SURGERY      LUNG TRANSPLANT      X2    [place] CYSTO/URETERO W/LITHOTRIPSY &INDWELL STENT INSRT Right 2/4/2020    Procedure: CYSTOSCOPY URETEROSCOPY WITH LITHOTRIPSY HOLMIUM LASER, RETROGRADE PYELOGRAM AND INSERTION STENT URETERAL;  Surgeon: Soniya Dickinson MD;  Location: [facility];  Service: Urology    [place] CYSTOURETHROSCOPY,URETER CATHETER Right 1/9/2020    Procedure: CYSTOSCOPY RETROGRADE PYELOGRAM WITH INSERTION STENT URETERAL;  Surgeon: Soniya Dickinson MD;  Location: [facility];  Service: Urology    US GUIDED VASCULAR ACCESS  1/17/2017       Family History   Problem Relation Age of Onset    Alcohol abuse Mother     Mental illness Mother     Alcohol abuse Father     COPD Maternal Grandmother     COPD Maternal Grandfather     COPD Paternal Grandmother     Breast cancer Neg Hx     Ovarian cancer Neg Hx     Colon cancer Neg Hx      I have reviewed and agree with the history as documented  E-Cigarette/Vaping    E-Cigarette Use Never User      E-Cigarette/Vaping Substances     Social History     Tobacco Use    Smoking status: Former Smoker     Packs/day: 0 50     Years: 12 00     Pack years: 6 00    Smokeless tobacco: Never Used   Vaping Use    Vaping Use: Never used   Substance Use Topics    Alcohol use: Yes     Comment: social    Drug use: Yes     Frequency: 2 0 times per week     Types: Marijuana     Comment: medical edible marijuana prescribed       Review of Systems   Constitutional: Positive for chills, fatigue and fever  HENT: Negative for congestion, ear pain, rhinorrhea and sore throat  Respiratory: Positive for cough  Negative for chest tightness, shortness of breath and wheezing  Cardiovascular: Negative for chest pain and palpitations     Gastrointestinal: Negative for abdominal distention, abdominal pain, blood in stool, constipation, diarrhea, nausea and vomiting         +Heartburn   Genitourinary: Negative for dysuria, flank pain, frequency and hematuria  Musculoskeletal: Positive for back pain  Negative for neck pain and neck stiffness  Skin: Negative for color change, pallor, rash and wound  Allergic/Immunologic: Negative for immunocompromised state  Neurological: Positive for numbness  Negative for dizziness, syncope, weakness, light-headedness and headaches  Hematological: Negative for adenopathy  Does not bruise/bleed easily  Psychiatric/Behavioral: Negative for confusion and decreased concentration  All other systems reviewed and are negative  Physical Exam  Physical Exam  Vitals and nursing note reviewed  Constitutional:       General: She is not in acute distress  Appearance: Normal appearance  She is well-developed  She is not toxic-appearing or diaphoretic  Comments: Patient appears chronically ill  HENT:      Head: Normocephalic and atraumatic  Right Ear: External ear normal       Left Ear: External ear normal       Mouth/Throat:      Mouth: Mucous membranes are moist       Pharynx: Oropharynx is clear  No oropharyngeal exudate  Eyes:      Extraocular Movements: Extraocular movements intact  Conjunctiva/sclera: Conjunctivae normal    Neck:      Vascular: No JVD  Cardiovascular:      Rate and Rhythm: Regular rhythm  Tachycardia present  Pulses: Normal pulses  Heart sounds: Normal heart sounds  No murmur heard  No friction rub  No gallop  Pulmonary:      Effort: Pulmonary effort is normal  No respiratory distress  Breath sounds: Normal breath sounds  No wheezing, rhonchi or rales  Abdominal:      General: There is no distension  Palpations: Abdomen is soft  Tenderness: There is no abdominal tenderness  There is no guarding or rebound  Musculoskeletal:         General: No swelling or tenderness  Normal range of motion        Cervical back: Normal range of motion and neck supple  No rigidity  Skin:     General: Skin is warm and dry  Coloration: Skin is not pale  Findings: No erythema or rash  Neurological:      General: No focal deficit present  Mental Status: She is alert and oriented to person, place, and time  Sensory: No sensory deficit  Motor: No weakness  Comments: 5/5 strength in all 4 extremities     Psychiatric:         Mood and Affect: Mood normal          Behavior: Behavior normal          Vital Signs  ED Triage Vitals   Temperature Pulse Respirations Blood Pressure SpO2   01/07/22 0811 01/07/22 0811 01/07/22 0811 01/07/22 0811 01/07/22 0811   (!) 102 2 °F (39 °C) 102 22 120/65 95 %      Temp Source Heart Rate Source Patient Position - Orthostatic VS BP Location FiO2 (%)   01/07/22 1600 01/07/22 1600 01/07/22 1600 01/07/22 1600 --   Oral Monitor Sitting Right arm       Pain Score       01/07/22 1727       Med Not Given for Pain - for MAR use only         Vitals:    01/07/22 2241 01/08/22 0121 01/08/22 0300 01/08/22 0736   BP: 91/57 95/63  111/63   BP Location:       Pulse: 79 81  71   Resp: (!) 26   17   Temp:  100 1 °F (37 8 °C)  (!) 97 4 °F (36 3 °C)   TempSrc:       SpO2: 96% 96%  95%   Weight:  40 8 kg (89 lb 15 2 oz) 43 9 kg (96 lb 12 5 oz)    Height:  5' 2" (1 575 m)         Visual Acuity      ED Medications  Medications   multi-electrolyte (PLASMALYTE-A/ISOLYTE-S PH 7 4) IV solution (100 mL/hr Intravenous Rate/Dose Change 1/8/22 0809)   acetaminophen (TYLENOL) tablet 650 mg (650 mg Oral Given 1/8/22 1235)   ALPRAZolam (XANAX) tablet 1 mg (0 5 mg Oral Refused 1/8/22 0808)   apixaban (ELIQUIS) tablet 5 mg (5 mg Oral Given 1/8/22 0808)   atovaquone (MEPRON) oral suspension 1,500 mg (1,500 mg Oral Given 1/8/22 0808)   pantoprazole (PROTONIX) EC tablet 40 mg (40 mg Oral Given 1/8/22 0613)   polyethylene glycol (MIRALAX) packet 17 g (has no administration in time range)   pregabalin (LYRICA) capsule 150 mg (150 mg Oral Given 1/8/22 0808)   tacrolimus (PROGRAF) capsule 3 mg (3 mg Oral Given 1/8/22 0807)   traZODone (DESYREL) tablet 25 mg (25 mg Oral Not Given 1/7/22 2118)   lidocaine (LIDODERM) 5 % patch 2 patch (2 patches Topical Not Given 1/8/22 0809)   hydrocortisone sodium succinate (PF) (Solu-CORTEF) injection 50 mg (50 mg Intravenous Given 1/8/22 0613)   insulin lispro (HumaLOG) 100 units/mL subcutaneous injection 1-5 Units (2 Units Subcutaneous Given 1/8/22 1119)   hydrOXYzine HCL (ATARAX) tablet 50 mg (50 mg Oral Given 1/8/22 0159)   aluminum-magnesium hydroxide-simethicone (MYLANTA) oral suspension 30 mL (30 mL Oral Given 1/8/22 1235)   oxyCODONE (ROXICODONE) IR tablet 5 mg (5 mg Oral Given 1/8/22 0215)   DAPTOmycin (CUBICIN) 350 mg in sodium chloride 0 9 % 50 mL IVPB (350 mg Intravenous New Bag 1/8/22 1235)   pancrelipase (Lip-Prot-Amyl) (CREON) delayed release capsule 48,000 Units (has no administration in time range)   ceFAZolin (ANCEF) IVPB (premix in dextrose) 2,000 mg 50 mL (has no administration in time range)   valACYclovir (VALTREX) tablet 500 mg (has no administration in time range)   sodium chloride 3 % inhalation solution 4 mL (has no administration in time range)   sodium chloride 0 9 % bolus 1,000 mL (0 mL Intravenous Stopped 1/7/22 1103)   ondansetron (ZOFRAN) injection 4 mg (4 mg Intravenous Given 1/7/22 0925)   aluminum-magnesium hydroxide-simethicone (MYLANTA) oral suspension 30 mL (30 mL Oral Given 1/7/22 0924)   piperacillin-tazobactam (ZOSYN) 4 5 g in sodium chloride 0 9 % 100 mL IVPB (0 g Intravenous Stopped 1/7/22 1219)   ketorolac (TORADOL) injection 15 mg (15 mg Intravenous Given 1/7/22 1018)   magnesium sulfate 2 g/50 mL IVPB (premix) 2 g (0 g Intravenous Stopped 1/7/22 1316)   multi-electrolyte (ISOLYTE-S PH 7 4) bolus 1,000 mL (0 mL Intravenous Stopped 1/7/22 1219)   lidocaine (LIDODERM) 5 % patch 1 patch (1 patch Topical Patch Removed 1/8/22 1042)   multi-electrolyte (ISOLYTE-S PH 7 4) bolus 1,000 mL (0 mL Intravenous Stopped 1/7/22 1448)   acetaminophen (TYLENOL) tablet 650 mg (650 mg Oral Given 1/7/22 1727)   fentanyl citrate (PF) 100 MCG/2ML 50 mcg (50 mcg Intravenous Given 1/7/22 1727)   morphine injection 2 mg (2 mg Intravenous Given 1/7/22 2047)       Diagnostic Studies  Results Reviewed     Procedure Component Value Units Date/Time    Magnesium [[id number]]  (Normal) Collected: 01/08/22 1113    Lab Status: Final result Specimen: Blood from Central Venous Line Updated: 01/08/22 1154     Magnesium 2 5 mg/dL     Comprehensive metabolic panel [[id number]]  (Abnormal) Collected: 01/08/22 1113    Lab Status: Final result Specimen: Blood from Central Venous Line Updated: 01/08/22 1151     Sodium 133 mmol/L      Potassium 4 8 mmol/L      Chloride 99 mmol/L      CO2 25 mmol/L      ANION GAP 9 mmol/L      BUN 20 mg/dL      Creatinine 1 18 mg/dL      Glucose 236 mg/dL      Calcium 7 5 mg/dL      Corrected Calcium 9 0 mg/dL      AST 42 U/L       U/L      Alkaline Phosphatase 228 U/L      Total Protein 6 2 g/dL      Albumin 2 1 g/dL      Total Bilirubin 0 42 mg/dL      eGFR 56 ml/min/1 73sq m     Narrative:      [place] guidelines for Chronic Kidney Disease (CKD):     Stage 1 with normal or high GFR (GFR > 90 mL/min/1 73 square meters)    Stage 2 Mild CKD (GFR = 60-89 mL/min/1 73 square meters)    Stage 3A Moderate CKD (GFR = 45-59 mL/min/1 73 square meters)    Stage 3B Moderate CKD (GFR = 30-44 mL/min/1 73 square meters)    Stage 4 Severe CKD (GFR = 15-29 mL/min/1 73 square meters)    Stage 5 End Stage CKD (GFR <15 mL/min/1 73 square meters)  Note: GFR calculation is accurate only with a steady state creatinine    CBC (With Platelets) [[id number]]  (Abnormal) Collected: 01/08/22 1113    Lab Status: Final result Specimen: Blood from Central Venous Line Updated: 01/08/22 1128     WBC 2 23 Thousand/uL      RBC 3 42 Million/uL      Hemoglobin 7 7 g/dL      Hematocrit 27 8 % MCV 81 fL      MCH 22 5 pg      MCHC 27 7 g/dL      RDW 16 6 %      Platelets 263 Thousands/uL      MPV 11 3 fL     Procalcitonin Reflex [[id number]] Collected: 01/08/22 1113    Lab Status: In process Specimen: Blood from Line, Venous Updated: 01/08/22 1125    Blood culture #2 [[id number]]  (Abnormal) Collected: 01/07/22 0921    Lab Status: Preliminary result Specimen: Blood from Arm, Right Updated: 01/08/22 0703     Gram Stain Result Gram positive cocci in clusters    Blood culture #1 [[id number]]  (Abnormal) Collected: 01/07/22 0921    Lab Status: Preliminary result Specimen: Blood from Arm, Left Updated: 01/08/22 0702     Gram Stain Result Gram positive cocci in clusters    Procalcitonin with AM Reflex [[id number]]  (Normal) Collected: 01/07/22 0921    Lab Status: Final result Specimen: Blood from Arm, Right Updated: 01/07/22 2100     Procalcitonin 0 21 ng/ml     Platelet count [[id number]]     Lab Status: No result Specimen: Blood     HS Troponin I 2hr [[id number]] Collected: 01/07/22 1223    Lab Status: Final result Specimen: Blood from Central Venous Line Updated: 01/07/22 1255     hs TnI 2hr 10 ng/L      Delta 2hr hsTnI 1 ng/L     COVID/FLU/RSV - 2 hour TAT [[id number]]  (Normal) Collected: 01/07/22 0921    Lab Status: Final result Specimen: Nares from Nose Updated: 01/07/22 1014     SARS-CoV-2 Negative     INFLUENZA A PCR Negative     INFLUENZA B PCR Negative     RSV PCR Negative    Narrative:      FOR PEDIATRIC PATIENTS - copy/paste COVID Guidelines URL to browser: [email or web address]    SARS-CoV-2 assay is a Nucleic Acid Amplification assay intended for the  qualitative detection of nucleic acid from SARS-CoV-2 in nasopharyngeal  swabs  Results are for the presumptive identification of SARS-CoV-2 RNA      Positive results are indicative of infection with SARS-CoV-2, the virus  causing COVID-19, but do not rule out bacterial infection or co-infection  with other viruses  Laboratories within the [place] and its  territories are required to report all positive results to the appropriate  public health authorities  Negative results do not preclude SARS-CoV-2  infection and should not be used as the sole basis for treatment or other  patient management decisions  Negative results must be combined with  clinical observations, patient history, and epidemiological information  This test has not been FDA cleared or approved  This test has been authorized by FDA under an Emergency Use Authorization  (EUA)  This test is only authorized for the duration of time the  declaration that circumstances exist justifying the authorization of the  emergency use of an in vitro diagnostic tests for detection of SARS-CoV-2  virus and/or diagnosis of COVID-19 infection under section 564(b)(1) of  the Act, 21 U  [place] C  865DZW-6(C)(5), unless the authorization is terminated  or revoked sooner  The test has been validated but independent review by FDA  and CLIA is pending  Test performed using [employer] GeneXpert: This RT-PCR assay targets N2,  a region unique to SARS-CoV-2  A conserved region in the E-gene was chosen  for pan-Sarbecovirus detection which includes SARS-CoV-2      Magnesium [[id number]]  (Abnormal) Collected: 01/07/22 0921    Lab Status: Final result Specimen: Blood from Arm, Right Updated: 01/07/22 1011     Magnesium 1 2 mg/dL     Lipase [[id number]]  (Abnormal) Collected: 01/07/22 0921    Lab Status: Final result Specimen: Blood from Arm, Right Updated: 01/07/22 1011     Lipase <10 u/L     Hepatic function panel [[id number]]  (Abnormal) Collected: 01/07/22 0921    Lab Status: Final result Specimen: Blood from Arm, Right Updated: 01/07/22 1011     Total Bilirubin 0 39 mg/dL      Bilirubin, Direct 0 10 mg/dL      Alkaline Phosphatase 283 U/L      AST 59 U/L       U/L      Total Protein 6 8 g/dL      Albumin 2 5 g/dL     hCG, qualitative pregnancy [[id number]]  (Normal) Collected: 01/07/22 0921    Lab Status: Final result Specimen: Blood from Arm, Right Updated: 01/07/22 1011     Preg, Serum Negative    Lactic acid [[id number]]  (Normal) Collected: 01/07/22 0921    Lab Status: Final result Specimen: Blood from Arm, Right Updated: 01/07/22 1008     LACTIC ACID 1 6 mmol/L     Narrative:      Result may be elevated if tourniquet was used during collection      Basic metabolic panel [[id number]]  (Abnormal) Collected: 01/07/22 0921    Lab Status: Final result Specimen: Blood from Arm, Right Updated: 01/07/22 0956     Sodium 140 mmol/L      Potassium 3 8 mmol/L      Chloride 103 mmol/L      CO2 29 mmol/L      ANION GAP 8 mmol/L      BUN 22 mg/dL      Creatinine 1 10 mg/dL      Glucose 122 mg/dL      Calcium 7 9 mg/dL      eGFR 61 ml/min/1 73sq m     Narrative:      [place] guidelines for Chronic Kidney Disease (CKD):     Stage 1 with normal or high GFR (GFR > 90 mL/min/1 73 square meters)    Stage 2 Mild CKD (GFR = 60-89 mL/min/1 73 square meters)    Stage 3A Moderate CKD (GFR = 45-59 mL/min/1 73 square meters)    Stage 3B Moderate CKD (GFR = 30-44 mL/min/1 73 square meters)    Stage 4 Severe CKD (GFR = 15-29 mL/min/1 73 square meters)    Stage 5 End Stage CKD (GFR <15 mL/min/1 73 square meters)  Note: GFR calculation is accurate only with a steady state creatinine    HS Troponin 0hr (reflex protocol) [[id number]]  (Normal) Collected: 01/07/22 0921    Lab Status: Final result Specimen: Blood from Arm, Right Updated: 01/07/22 0955     hs TnI 0hr 9 ng/L     Protime-INR [[id number]]  (Normal) Collected: 01/07/22 0921    Lab Status: Final result Specimen: Blood from Arm, Right Updated: 01/07/22 0944     Protime 13 4 seconds      INR 1 07    APTT [[id number]]  (Normal) Collected: 01/07/22 0921    Lab Status: Final result Specimen: Blood from Arm, Right Updated: 01/07/22 0944     PTT 28 seconds     CBC and differential [[id number]]  (Abnormal) Collected: 01/07/22 3096    Lab Status: Final result Specimen: Blood from Arm, Right Updated: 01/07/22 0932     WBC 2 94 Thousand/uL      RBC 3 60 Million/uL      Hemoglobin 8 1 g/dL      Hematocrit 28 9 %      MCV 80 fL      MCH 22 5 pg      MCHC 28 0 g/dL      RDW 16 6 %      MPV 10 7 fL      Platelets 855 Thousands/uL      nRBC 0 /100 WBCs      Neutrophils Relative 65 %      Immat GRANS % 0 %      Lymphocytes Relative 22 %      Monocytes Relative 11 %      Eosinophils Relative 1 %      Basophils Relative 1 %      Neutrophils Absolute 1 89 Thousands/µL      Immature Grans Absolute 0 01 Thousand/uL      Lymphocytes Absolute 0 65 Thousands/µL      Monocytes Absolute 0 33 Thousand/µL      Eosinophils Absolute 0 04 Thousand/µL      Basophils Absolute 0 02 Thousands/µL                  CT chest abdomen pelvis wo contrast   Final Result by Ashley Parikh DO (01/07 1127)      No acute inflammatory process identified  Findings suggestive of esophageal reflux and esophagitis  Consider upper endoscopy when clinically appropriate, if not recently performed  Additional nonacute findings, as described, unchanged  Workstation performed: [id number]         XR chest 1 view portable   Final Result by Jaime Reich MD (01/07 0919)      Stable postoperative appearance of the right thorax post pneumonectomy     Left lung is clear                  Workstation performed: [id number]         MRI inpatient order    (Results Pending)              Procedures  ECG 12 Lead Documentation Only    Date/Time: 1/7/2022 8:50 AM  Performed by: Stephani Perry DO  Authorized by: Stephani Perry DO     ECG reviewed by me, the ED Provider: yes    Patient location:  ED  Previous ECG:     Previous ECG:  Compared to current    Comparison ECG info:  12-2-2021    Similarity:  No change  Interpretation:     Interpretation: normal    Rate:     ECG rate:  99    ECG rate assessment: normal    Rhythm:     Rhythm: sinus rhythm    Ectopy: Ectopy: none    QRS:     QRS axis:  Normal    QRS intervals:  Normal  Conduction:     Conduction: normal    ST segments:     ST segments:  Normal  T waves:     T waves: normal               ED Course  ED Course as of 01/08/22 1313   Fri Jan 07, 2022   3509 Although patient has leukopenia which according to prior labs 1 month ago is new, she is not neutropenic as absolute neutrophil count within normal limits  8711 Patient does meet SIRS criteria and given her history of immunocompromise, will start Zosyn (allergy to vanco and cephalosporins)  Zosyn has Pseudomonas coverage and patient does have history of prior Pseudomonas infection  Although obvious source is not identified, will cover for sepsis  1002 Hemoglobin(!): 8 1  Stable from 1 month ago  1059 Patient's blood pressure low in the 40I systolic  Will add 2 L of IV fluids  Waiting on CT results and then will touch base with transplant team at [facility] to see if they want patient transferred or if she can be admitted here  1119 Pressure improving with further fluids, now 82/52  Patient requesting pain medication for her back but will hold off on any narcotics until her pressure improved  Patient understands  1134 CT unremarkable  Transfer request made to get in touch with [facility] transplant team   At this time no definite indication for transfer but will touch base with her specialist to ensure she is able to be admitted at [facility]  [phone] with Transplant Team at [facility], Dr Ericka Chaudhry, who recommended she be transferred to [facility] due to her complex medical history  Patient is agreeable  Dr Ericka Chaudhry did not recommend starting pressors unless pressure significantly low or she is having other signs of hypoperfusion  She is not tachycardic and is mentating normally  Will continue to provide fluids  1635 Signed patient out to Dr Josafat Eastman who will assume care of patient while awaiting transfer  MDM  Number of Diagnoses or Management Options  Diagnosis management comments: 24-year-old immunocompromised female with history of 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, recent right-sided pneumonectomy, hypertension, diabetes, presents to the ED by ambulance for high fever, dry cough and general malaise  Differential includes viral syndrome including but not limited to COVID-19, pneumonia, transplant rejection, neutropenic fever, bacteremia  Will do full cardiac, abdominal and sepsis workup with cultures, lactic acid and procalcitonin  Will start with chest x-ray but have low threshold for CT scan given her significant lung history  Will swab for COVID/flu  Will provide IV fluid bolus, Maalox for heartburn, Zofran  Fever has come down from 106 which was reported at home to now 102 2  She reports inability to tolerate NSAIDs and does have history of reduced GFR so will hold off on further antipyretic medication at this time          Amount and/or Complexity of Data Reviewed  Clinical lab tests: ordered and reviewed  Tests in the radiology section of CPT®: ordered and reviewed  Tests in the medicine section of CPT®: reviewed and ordered  Independent visualization of images, tracings, or specimens: yes        Disposition  Final diagnoses:   Fever, unknown origin   Immunocompromised state due to drug therapy ([facility] )   Hypotension   Hypomagnesemia   SIRS (systemic inflammatory response syndrome) (HCC)   Leukopenia     Time reflects when diagnosis was documented in both MDM as applicable and the Disposition within this note     Time User Action Codes Description Comment    1/7/2022 12:08 PM Jearline Lilly E Add [R50 9] Fever, unknown origin     1/7/2022 12:08 PM Edrie Client Add [N36 354,  Z34 899] Immunocompromised state due to drug therapy ([facility] )     1/7/2022 12:08 PM Jearline [place] E Add [I95 9] Hypotension     1/7/2022 12:09 PM Jearline [place] E Add [E83 42] Hypomagnesemia     1/7/2022 12:09 PM Ignacionayailyn Gabriella ETHEL Add [R65 10] SIRS (systemic inflammatory response syndrome) ([facility] )     1/7/2022 12:09 PM Alix Maddox Add [I29 755] Leukopenia     1/7/2022  5:32 PM Jennifer Corpus Add [J18 9] Recurrent pneumonia     1/7/2022  5:32 PM Jennifer Corpus Add [R50 9] Fever     1/7/2022  5:32 PM Jennifer Corpus Add [E84 9] Cystic fibrosis ([facility] )     1/7/2022  7:55 PM Jennifer Corpus Add [R62 7] FTT (failure to thrive) in adult     1/8/2022  7:46 AM Nell Gunderson Add [R78 81] Gram-positive bacteremia       ED Disposition     ED Disposition Condition Date/Time Comment    Admit Stable Fri Jan 7, 2022  5:26 PM Case was discussed with KORY and the patient's admission status was agreed to be Admission Status: inpatient status to the service of Dr Angie Perez MD Documentation      Most Recent Value   Patient Condition The patient has been stabilized such that within reasonable medical probability, no material deterioration of the patient condition or the condition of the unborn child(joseph) is likely to result from the transfer   Reason for Transfer Level of Care needed not available at this facility   Benefits of Transfer Specialized equipment and/or services available at the receiving facility (Include comment)________________________, Continuity of care  [Patient's transplant team at [facility]]   Risks of Transfer Potential for delay in receiving treatment, Potential deterioration of medical condition, Loss of IV, Increased discomfort during transfer, Possible worsening of condition or death during transfer   Accepting Physician Dr Ruiz  Name, Maxime Nose of [facility]   Sending MD Dr Julianne Villa   Provider Certification General risk, such as traffic hazards, adverse weather conditions, rough terrain or turbulence, possible failure of equipment (including vehicle or aircraft), or consequences of actions of persons outside the control of the transport personnel, Unanticipated needs of medical equipment and personnel during transport, Risk of worsening condition, The possibility of a transport vehicle being unavailable      RN Documentation      Most [address] Name, Ernesto Dumont of [facility]      Follow-up Information    None         Current Discharge Medication List      CONTINUE these medications which have NOT CHANGED    Details   acetaminophen (TYLENOL) 325 mg tablet Take 3 tablets (975 mg total) by mouth every 8 (eight) hours  Refills: 0    Associated Diagnoses: Chest pain, unspecified type      acetylcysteine (MUCOMYST) 200 mg/mL nebulizer solution Take 2 mL (400 mg total) by nebulization 2 (two) times a day  Refills: 0    Associated Diagnoses: Lung transplant status, bilateral (HCC)      albuterol (2 5 mg/3 mL) 0 083 % nebulizer solution Take 6 mL (5 mg total) by nebulization 2 (two) times a day  Refills: 0    Associated Diagnoses: Lung transplant status, bilateral (HCC)      ALPRAZolam (XANAX) 1 mg tablet Take 1 tablet (1 mg total) by mouth 2 (two) times a day  Qty: 60 tablet, Refills: 1    Comments: This request is for a new prescription for a controlled substance as required by Federal/State law    Associated Diagnoses: Cystic fibrosis (HCC)      apixaban (ELIQUIS) 5 mg Take 1 tablet (5 mg total) by mouth 2 (two) times a day  Refills: 0    Associated Diagnoses: DVT (deep venous thrombosis) (HCC)      atovaquone (MEPRON) 750 mg/5 mL suspension Take 1,500 mg by mouth daily      capsicum (ZOSTRIX) 0 075 % topical cream Apply topically 3 (three) times a day  Qty: 28 3 g, Refills: 0    Associated Diagnoses: Acute knee pain, unspecified laterality      dronabinol (MARINOL) 5 MG capsule TAKE 1 CAPSULE (5 MG TOTAL) BY MOUTH 3 (THREE) TIMES A DAY  Qty: 90 capsule, Refills: 3    Comments: Not to exceed 4 additional fills before 01/01/2022  Associated Diagnoses: Other disorders of lung famotidine (PEPCID) 20 mg tablet Take 20 mg by mouth 2 (two) times a day      insulin lispro (HumaLOG) 100 units/mL injection Inject 4 Units under the skin 3 (three) times a day with meals  Refills: 0    Associated Diagnoses: 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, with long-term current use of insulin, unspecified CKD stage (HCC)      lidocaine (LIDODERM) 5 % Apply 2 patches topically daily Remove & Discard patch within 12 hours or as directed by MD  Refills: 0    Associated Diagnoses: Chest pain, unspecified type      oxyCODONE (ROXICODONE) 10 MG TABS Take 10 mg by mouth      pancrelipase, Lip-Prot-Amyl, (CREON) 24,000 units Take 48,000 Units by mouth 3 (three) times a day with meals      pantoprazole (PROTONIX) 40 mg tablet Take 1 tablet (40 mg total) by mouth daily in the early morning  Refills: 0    Associated Diagnoses: Gastroesophageal reflux disease without esophagitis      polyethylene glycol (MIRALAX) 17 g packet Take 17 g by mouth 2 (two) times a day as needed      predniSONE 10 mg tablet Take 10 mg by mouth daily      pregabalin (LYRICA) 75 mg capsule Take 150 mg by mouth Three times a day        Respiratory Therapy Supplies (Nebulizer) SUKHI Please dispense nebulizer machine      sodium chloride 3 % inhalation solution Inhale 4 mL 2 (two) times a day      SYRINGE-NEEDLE, DISP, 3 ML 23G X 1" 3 ML MISC Use twice daily to draw up acetylcysteine      tacrolimus (PROGRAF) 1 mg capsule Take 3 mg by mouth 2 (two) times a day      traZODone (DESYREL) 50 mg tablet Take 0 5 tablets (25 mg total) by mouth daily at bedtime  Refills: 0    Associated Diagnoses: Depression      valACYclovir (VALTREX) 500 mg tablet Take 500 mg by mouth daily             No discharge procedures on file      PDMP Review       Value Time User    PDMP Reviewed  Yes 1/8/2022  2:02 AM Ines Churchill          ED Provider  Electronically Signed by           Will Payne DO  01/08/22 [address]

## 2022-01-07 NOTE — EMTALA/ACUTE CARE TRANSFER
[address] [facility] [address]  [address]  [place] [address]  Dept: [phone]      EMTALA TRANSFER CONSENT    NAME Miguel Angel Fortune                                         1975                              MRN [id number]    I have been informed of my rights regarding examination, treatment, and transfer   by Dr Allison Wadsworth,*    Benefits: Specialized equipment and/or services available at the receiving facility (Include comment)________________________,Continuity of care (Patient's transplant team at [place] [address])    Risks: Potential for delay in receiving treatment,Potential deterioration of medical condition,Loss of IV,Increased discomfort during transfer,Possible worsening of condition or death during transfer      Consent for Transfer:  I acknowledge that my medical condition has been evaluated and explained to me by the emergency department physician or other qualified medical person and/or my attending physician, who has recommended that I be transferred to the service of  Accepting Physician: Dr Ericka Chaudhry at [address] Name, [address] : [facility]  The above potential benefits of such transfer, the potential risks associated with such transfer, and the probable risks of not being transferred have been explained to me, and I fully understand them  The doctor has explained that, in my case, the benefits of transfer outweigh the risks  I agree to be transferred  I authorize the performance of emergency medical procedures and treatments upon me in both transit and upon arrival at the receiving facility  Additionally, I authorize the release of any and all medical records to the receiving facility and request they be transported with me, if possible  I understand that the safest mode of transportation during a medical emergency is an ambulance and that the Hospital advocates the use of this mode of transport   Risks of traveling to the receiving facility by car, including absence of medical control, life sustaining equipment, such as oxygen, and medical personnel has been explained to me and I fully understand them  (JUSTIN CORRECT BOX BELOW)  [  ]  I consent to the stated transfer and to be transported by ambulance/helicopter  [  ]  I consent to the stated transfer, but refuse transportation by ambulance and accept full responsibility for my transportation by car  I understand the risks of non-ambulance transfers and I exonerate the Hospital and its staff from any deterioration in my condition that results from this refusal     X___________________________________________    DATE  22  TIME________  Signature of patient or legally responsible individual signing on patient behalf           RELATIONSHIP TO PATIENT_________________________          Provider Certification    NAME Fidelia Jacques                                        [facility] 1975                              MRN [id number]    A medical screening exam was performed on the above named patient  Based on the examination:    Condition Necessitating Transfer The primary encounter diagnosis was Fever, unknown origin  Diagnoses of Immunocompromised state due to drug therapy ([address] ), Hypotension, Hypomagnesemia, SIRS (systemic inflammatory response syndrome) ([facility] ), and Leukopenia were also pertinent to this visit      Patient Condition: The patient has been stabilized such that within reasonable medical probability, no material deterioration of the patient condition or the condition of the unborn child(josehp) is likely to result from the transfer    Reason for Transfer: Level of Care needed not available at this facility    Transfer Requirements: [address] of [facility]   · Space available and qualified personnel available for treatment as acknowledged by    · Agreed to accept transfer and to provide appropriate medical treatment as acknowledged by       Dr Luz Maria Dhillon  · Appropriate medical records of the examination and treatment of the patient are provided at the time of transfer   [address] [facility], [address] _______  · Transfer will be performed by qualified personnel from    and appropriate transfer equipment as required, including the use of necessary and appropriate life support measures  Provider Certification: I have examined the patient and explained the following risks and benefits of being transferred/refusing transfer to the patient/family:  General risk, such as traffic hazards, adverse weather conditions, rough terrain or turbulence, possible failure of equipment (including vehicle or aircraft), or consequences of actions of persons outside the control of the transport personnel,Unanticipated needs of medical equipment and personnel during transport,Risk of worsening condition,The possibility of a transport vehicle being unavailable      Based on these reasonable risks and benefits to the patient and/or the unborn child(joseph), and based upon the information available at the time of the patients examination, I certify that the medical benefits reasonably to be expected from the provision of appropriate medical treatments at another medical facility outweigh the increasing risks, if any, to the individuals medical condition, and in the case of labor to the unborn child, from effecting the transfer      X____________________________________________ DATE 01/07/22        TIME_______      ORIGINAL - SEND TO MEDICAL RECORDS   COPY - SEND WITH PATIENT DURING TRANSFER

## 2022-01-07 NOTE — ED CARE HANDOFF
Emergency Department Sign Out Note        Sign out and transfer of care from Dr Chela Conteh  See Separate Emergency Department note  The patient, Franchesca Vasquez, was evaluated by the previous provider for fever of unknown origin in transplant patient with recent pneumonectomy  Workup Completed:  Initial labs and diagnostic sent, no source of fever identified at time of sign out  Started broad-spectrum antibiotics  Accepted for transfer by Dr Arabella Mckee at [facility], pending transport to [facility] at time of sign out  ED Course / Workup Pending (followup): Discussed with patient's transplant team in [facility]  Spoke with Dr Phill Lopez of [facility]  He noted that they have no bed availability at this time  After review of the case, he feels that patient could be treated safely this facility with consultation with his team as needed  He notes prior culture results showing Pseudomonas that was susceptible to Zosyn as well as prior diagnosis of MAC and possibly a viral etiology  He recommended continuing Zosyn while awaiting cultures and trending labs and x-rays  As patient is felt unlikely to benefit from transfer and transfer cannot be accomplished at this time due to lack of bed availability, plan formed for admission at this facility  Patient and her  both comfortable with this plan after discussion of risks and benefits  Admitted Internal Medicine, hemodynamically stable and comfortable at that time                                       Procedures  MDM        Disposition  Final diagnoses:   Fever, unknown origin   Immunocompromised state due to drug therapy ([facility] )   Hypotension   Hypomagnesemia   SIRS (systemic inflammatory response syndrome) (HCC)   Leukopenia     Time reflects when diagnosis was documented in both MDM as applicable and the Disposition within this note     Time User Action Codes Description Comment    1/7/2022 12:08 PM Han DAVENPORT Add [R50 9] Fever, unknown origin     1/7/2022 12:08 PM Claynisa Almonte Add [D78 405,  Z79 899] Immunocompromised state due to drug therapy ([facility] )     1/7/2022 12:08 PM Van Lear Oris E Add [I95 9] Hypotension     1/7/2022 12:09 PM Bethany Oris E Add [E83 42] Hypomagnesemia     1/7/2022 12:09 PM Van Lear Oris E Add [R65 10] SIRS (systemic inflammatory response syndrome) ([facility] )     1/7/2022 12:09 PM [place] Lear Oris E Add [D72 819] Leukopenia       ED Disposition     ED Disposition Condition Date/Time Comment    Transfer to Another Critical access hospital [facility]  Fri Jan 7, 2022 12:09 PM Eugenio Harris should be transferred out to [facility] under care of Dr Albert Martin MD Documentation      Most Recent Value   Patient Condition The patient has been stabilized such that within reasonable medical probability, no material deterioration of the patient condition or the condition of the unborn child(joseph) is likely to result from the transfer   Reason for Transfer Level of Care needed not available at this facility   Benefits of Transfer Specialized equipment and/or services available at the receiving facility (Include comment)________________________, Continuity of care  [Patient's transplant team at [facility]]   Risks of Transfer Potential for delay in receiving treatment, Potential deterioration of medical condition, Loss of IV, Increased discomfort during transfer, Possible worsening of condition or death during transfer   Accepting Physician Selma Mcadams of [facility] MD Dr Sweeney December   Provider Certification General risk, such as traffic hazards, adverse weather conditions, rough terrain or turbulence, possible failure of equipment (including vehicle or aircraft), or consequences of actions of persons outside the control of the transport personnel, Unanticipated needs of medical equipment and personnel during transport, Risk of worsening condition, The possibility of a transport vehicle being unavailable      RN Documentation      Most Recent Value   Accepting Facility Name, [facility] of [facility]      Follow-up Information    None       Patient's Medications   Discharge Prescriptions    No medications on file     No discharge procedures on file         ED Provider  Electronically Signed by     Quentin Alvarenga MD  01/07/22 7732

## 2022-01-07 NOTE — Clinical Note
Dea Salmons should be transferred out to \[facility]\"" Resources under care of Dr Shay Guadalupe

## 2022-01-07 NOTE — EMTALA/ACUTE CARE TRANSFER
[address] [facility] [address]  [address]  [facility] [address]  Dept: [phone]      EMTALA TRANSFER CONSENT    NAME Richard Fortune                                         1975                              MRN [id number]    I have been informed of my rights regarding examination, treatment, and transfer   by Dr Edu Moeller,*    Benefits:      Risks:        Consent for Transfer:  I acknowledge that my medical condition has been evaluated and explained to me by the emergency department physician or other qualified medical person and/or my attending physician, who has recommended that I be transferred to the service of    at    The above potential benefits of such transfer, the potential risks associated with such transfer, and the probable risks of not being transferred have been explained to me, and I fully understand them  The doctor has explained that, in my case, the benefits of transfer outweigh the risks  I agree to be transferred  I authorize the performance of emergency medical procedures and treatments upon me in both transit and upon arrival at the receiving facility  Additionally, I authorize the release of any and all medical records to the receiving facility and request they be transported with me, if possible  I understand that the safest mode of transportation during a medical emergency is an ambulance and that the Hospital advocates the use of this mode of transport  Risks of traveling to the receiving facility by car, including absence of medical control, life sustaining equipment, such as oxygen, and medical personnel has been explained to me and I fully understand them  (JUSTIN CORRECT BOX BELOW)  [  ]  I consent to the stated transfer and to be transported by ambulance/helicopter  [  ]  I consent to the stated transfer, but refuse transportation by ambulance and accept full responsibility for my transportation by car    I understand the risks of non-ambulance transfers and I exonerate the Hospital and its staff from any deterioration in my condition that results from this refusal     X___________________________________________    DATE  22  TIME________  Signature of patient or legally responsible individual signing on patient behalf           RELATIONSHIP TO PATIENT_________________________          Provider Certification    NAME Geovanna Mendez                                         1975                              MRN [id number]    A medical screening exam was performed on the above named patient  Based on the examination:    Condition Necessitating Transfer The primary encounter diagnosis was Fever, unknown origin  Diagnoses of Immunocompromised state due to drug therapy ([facility] ), Hypotension, Hypomagnesemia, SIRS (systemic inflammatory response syndrome) ([facility] ), and Leukopenia were also pertinent to this visit  Patient Condition:      Reason for Transfer:      Transfer Requirements: Facility     · Space available and qualified personnel available for treatment as acknowledged by    · Agreed to accept transfer and to provide appropriate medical treatment as acknowledged by          · Appropriate medical records of the examination and treatment of the patient are provided at the time of transfer   [address] [facility], [address] _______  · Transfer will be performed by qualified personnel from    and appropriate transfer equipment as required, including the use of necessary and appropriate life support measures      Provider Certification: I have examined the patient and explained the following risks and benefits of being transferred/refusing transfer to the patient/family:         Based on these reasonable risks and benefits to the patient and/or the unborn child(joseph), and based upon the information available at the time of the patients examination, I certify that the medical benefits reasonably to be expected from the provision of appropriate medical treatments at another medical facility outweigh the increasing risks, if any, to the individuals medical condition, and in the case of labor to the unborn child, from effecting the transfer      X____________________________________________ DATE 01/07/22        TIME_______      ORIGINAL - SEND TO MEDICAL RECORDS   COPY - SEND WITH PATIENT DURING TRANSFER

## 2022-01-08 ENCOUNTER — APPOINTMENT (INPATIENT)
Dept: MRI IMAGING | Facility: HOSPITAL | Age: 47
DRG: 314 | End: 2022-01-08
Payer: COMMERCIAL

## 2022-01-08 PROBLEM — E44.0 MODERATE PROTEIN-CALORIE MALNUTRITION (HCC): Status: ACTIVE | Noted: 2022-01-08

## 2022-01-08 PROBLEM — R78.81 GRAM-POSITIVE COCCI BACTEREMIA: Status: ACTIVE | Noted: 2022-01-08

## 2022-01-08 LAB
ALBUMIN SERPL BCP-MCNC: 2.1 G/DL (ref 3.5–5)
ALP SERPL-CCNC: 228 U/L (ref 46–116)
ALT SERPL W P-5'-P-CCNC: 105 U/L (ref 12–78)
ANION GAP SERPL CALCULATED.3IONS-SCNC: 9 MMOL/L (ref 4–13)
AST SERPL W P-5'-P-CCNC: 42 U/L (ref 5–45)
BACTERIA UR QL AUTO: ABNORMAL /HPF
BILIRUB SERPL-MCNC: 0.42 MG/DL (ref 0.2–1)
BILIRUB UR QL STRIP: NEGATIVE
BUN SERPL-MCNC: 20 MG/DL (ref 5–25)
CALCIUM ALBUM COR SERPL-MCNC: 9 MG/DL (ref 8.3–10.1)
CALCIUM SERPL-MCNC: 7.5 MG/DL (ref 8.3–10.1)
CHLORIDE SERPL-SCNC: 99 MMOL/L (ref 100–108)
CLARITY UR: ABNORMAL
CO2 SERPL-SCNC: 25 MMOL/L (ref 21–32)
COLOR UR: YELLOW
CREAT SERPL-MCNC: 1.18 MG/DL (ref 0.6–1.3)
ERYTHROCYTE [DISTWIDTH] IN BLOOD BY AUTOMATED COUNT: 16.6 % (ref 11.6–15.1)
GFR SERPL CREATININE-BSD FRML MDRD: 56 ML/MIN/1.73SQ M
GLUCOSE SERPL-MCNC: 128 MG/DL (ref 65–140)
GLUCOSE SERPL-MCNC: 162 MG/DL (ref 65–140)
GLUCOSE SERPL-MCNC: 196 MG/DL (ref 65–140)
GLUCOSE SERPL-MCNC: 236 MG/DL (ref 65–140)
GLUCOSE SERPL-MCNC: 250 MG/DL (ref 65–140)
GLUCOSE SERPL-MCNC: 329 MG/DL (ref 65–140)
GLUCOSE SERPL-MCNC: 331 MG/DL (ref 65–140)
GLUCOSE UR STRIP-MCNC: NEGATIVE MG/DL
HCT VFR BLD AUTO: 27.8 % (ref 34.8–46.1)
HGB BLD-MCNC: 7.7 G/DL (ref 11.5–15.4)
HGB UR QL STRIP.AUTO: ABNORMAL
KETONES UR STRIP-MCNC: ABNORMAL MG/DL
LEUKOCYTE ESTERASE UR QL STRIP: ABNORMAL
MAGNESIUM SERPL-MCNC: 2.5 MG/DL (ref 1.6–2.6)
MCH RBC QN AUTO: 22.5 PG (ref 26.8–34.3)
MCHC RBC AUTO-ENTMCNC: 27.7 G/DL (ref 31.4–37.4)
MCV RBC AUTO: 81 FL (ref 82–98)
NITRITE UR QL STRIP: NEGATIVE
NON-SQ EPI CELLS URNS QL MICRO: ABNORMAL /HPF
PH UR STRIP.AUTO: 7 [PH]
PLATELET # BLD AUTO: 143 THOUSANDS/UL (ref 149–390)
PMV BLD AUTO: 11.3 FL (ref 8.9–12.7)
POTASSIUM SERPL-SCNC: 4.8 MMOL/L (ref 3.5–5.3)
PROT SERPL-MCNC: 6.2 G/DL (ref 6.4–8.2)
PROT UR STRIP-MCNC: NEGATIVE MG/DL
RBC # BLD AUTO: 3.42 MILLION/UL (ref 3.81–5.12)
RBC #/AREA URNS AUTO: ABNORMAL /HPF
SODIUM SERPL-SCNC: 133 MMOL/L (ref 136–145)
SP GR UR STRIP.AUTO: 1.01 (ref 1–1.03)
UROBILINOGEN UR QL STRIP.AUTO: 0.2 E.U./DL
WBC # BLD AUTO: 2.23 THOUSAND/UL (ref 4.31–10.16)
WBC #/AREA URNS AUTO: ABNORMAL /HPF

## 2022-01-08 PROCEDURE — 81001 URINALYSIS AUTO W/SCOPE: CPT | Performed by: INTERNAL MEDICINE

## 2022-01-08 PROCEDURE — 84145 PROCALCITONIN (PCT): CPT | Performed by: EMERGENCY MEDICINE

## 2022-01-08 PROCEDURE — 99223 1ST HOSP IP/OBS HIGH 75: CPT | Performed by: NURSE PRACTITIONER

## 2022-01-08 PROCEDURE — 80053 COMPREHEN METABOLIC PANEL: CPT | Performed by: HOSPITALIST

## 2022-01-08 PROCEDURE — 82948 REAGENT STRIP/BLOOD GLUCOSE: CPT

## 2022-01-08 PROCEDURE — 99223 1ST HOSP IP/OBS HIGH 75: CPT | Performed by: INTERNAL MEDICINE

## 2022-01-08 PROCEDURE — 72157 MRI CHEST SPINE W/O & W/DYE: CPT

## 2022-01-08 PROCEDURE — G1004 CDSM NDSC: HCPCS

## 2022-01-08 PROCEDURE — 99233 SBSQ HOSP IP/OBS HIGH 50: CPT | Performed by: INTERNAL MEDICINE

## 2022-01-08 PROCEDURE — A9585 GADOBUTROL INJECTION: HCPCS | Performed by: INTERNAL MEDICINE

## 2022-01-08 PROCEDURE — 94640 AIRWAY INHALATION TREATMENT: CPT

## 2022-01-08 PROCEDURE — 85027 COMPLETE CBC AUTOMATED: CPT | Performed by: HOSPITALIST

## 2022-01-08 PROCEDURE — 83735 ASSAY OF MAGNESIUM: CPT | Performed by: HOSPITALIST

## 2022-01-08 RX ORDER — SODIUM CHLORIDE, SODIUM GLUCONATE, SODIUM ACETATE, POTASSIUM CHLORIDE, MAGNESIUM CHLORIDE, SODIUM PHOSPHATE, DIBASIC, AND POTASSIUM PHOSPHATE .53; .5; .37; .037; .03; .012; .00082 G/100ML; G/100ML; G/100ML; G/100ML; G/100ML; G/100ML; G/100ML
100 INJECTION, SOLUTION INTRAVENOUS CONTINUOUS
Status: DISCONTINUED | OUTPATIENT
Start: 2022-01-08 | End: 2022-01-09

## 2022-01-08 RX ORDER — OXYCODONE HYDROCHLORIDE 5 MG/1
5 TABLET ORAL EVERY 6 HOURS PRN
Status: DISCONTINUED | OUTPATIENT
Start: 2022-01-08 | End: 2022-01-09

## 2022-01-08 RX ORDER — CEFAZOLIN SODIUM 2 G/50ML
2000 SOLUTION INTRAVENOUS EVERY 8 HOURS
Status: DISCONTINUED | OUTPATIENT
Start: 2022-01-08 | End: 2022-01-11

## 2022-01-08 RX ORDER — SODIUM CHLORIDE 30 MG/ML INHALATION SOLUTION 30 MG/ML
4 SOLUTION INHALANT 2 TIMES DAILY
Status: DISCONTINUED | OUTPATIENT
Start: 2022-01-08 | End: 2022-01-08

## 2022-01-08 RX ORDER — MAGNESIUM HYDROXIDE/ALUMINUM HYDROXICE/SIMETHICONE 120; 1200; 1200 MG/30ML; MG/30ML; MG/30ML
30 SUSPENSION ORAL EVERY 4 HOURS PRN
Status: DISCONTINUED | OUTPATIENT
Start: 2022-01-08 | End: 2022-01-14 | Stop reason: HOSPADM

## 2022-01-08 RX ORDER — VALACYCLOVIR HYDROCHLORIDE 500 MG/1
500 TABLET, FILM COATED ORAL EVERY 24 HOURS
Status: DISCONTINUED | OUTPATIENT
Start: 2022-01-08 | End: 2022-01-14 | Stop reason: HOSPADM

## 2022-01-08 RX ORDER — SODIUM CHLORIDE 30 MG/ML INHALATION SOLUTION 30 MG/ML
4 SOLUTION INHALANT
Status: DISCONTINUED | OUTPATIENT
Start: 2022-01-08 | End: 2022-01-14 | Stop reason: HOSPADM

## 2022-01-08 RX ORDER — OXYCODONE HYDROCHLORIDE 5 MG/1
5 TABLET ORAL EVERY 6 HOURS PRN
Status: DISCONTINUED | OUTPATIENT
Start: 2022-01-08 | End: 2022-01-08

## 2022-01-08 RX ORDER — HYDROXYZINE HYDROCHLORIDE 25 MG/1
50 TABLET, FILM COATED ORAL EVERY 6 HOURS PRN
Status: DISCONTINUED | OUTPATIENT
Start: 2022-01-08 | End: 2022-01-14 | Stop reason: HOSPADM

## 2022-01-08 RX ADMIN — VALACYCLOVIR HYDROCHLORIDE 500 MG: 500 TABLET, FILM COATED ORAL at 16:05

## 2022-01-08 RX ADMIN — TACROLIMUS 3 MG: 0.5 CAPSULE ORAL at 08:07

## 2022-01-08 RX ADMIN — APIXABAN 5 MG: 5 TABLET, FILM COATED ORAL at 08:08

## 2022-01-08 RX ADMIN — INSULIN LISPRO 2 UNITS: 100 INJECTION, SOLUTION INTRAVENOUS; SUBCUTANEOUS at 11:19

## 2022-01-08 RX ADMIN — OXYCODONE HYDROCHLORIDE 5 MG: 5 TABLET ORAL at 22:03

## 2022-01-08 RX ADMIN — PANCRELIPASE 24000 UNITS: 24000; 76000; 120000 CAPSULE, DELAYED RELEASE PELLETS ORAL at 08:07

## 2022-01-08 RX ADMIN — INSULIN LISPRO 1 UNITS: 100 INJECTION, SOLUTION INTRAVENOUS; SUBCUTANEOUS at 16:33

## 2022-01-08 RX ADMIN — PANTOPRAZOLE SODIUM 40 MG: 40 TABLET, DELAYED RELEASE ORAL at 06:13

## 2022-01-08 RX ADMIN — HYDROCORTISONE SODIUM SUCCINATE 50 MG: 100 INJECTION, POWDER, FOR SOLUTION INTRAMUSCULAR; INTRAVENOUS at 06:13

## 2022-01-08 RX ADMIN — SODIUM CHLORIDE, SODIUM GLUCONATE, SODIUM ACETATE, POTASSIUM CHLORIDE, MAGNESIUM CHLORIDE, SODIUM PHOSPHATE, DIBASIC, AND POTASSIUM PHOSPHATE 100 ML/HR: .53; .5; .37; .037; .03; .012; .00082 INJECTION, SOLUTION INTRAVENOUS at 22:18

## 2022-01-08 RX ADMIN — PREGABALIN 150 MG: 75 CAPSULE ORAL at 16:05

## 2022-01-08 RX ADMIN — PREGABALIN 150 MG: 75 CAPSULE ORAL at 08:08

## 2022-01-08 RX ADMIN — ACETAMINOPHEN 650 MG: 325 TABLET, FILM COATED ORAL at 12:35

## 2022-01-08 RX ADMIN — HYDROCORTISONE SODIUM SUCCINATE 50 MG: 100 INJECTION, POWDER, FOR SOLUTION INTRAMUSCULAR; INTRAVENOUS at 22:03

## 2022-01-08 RX ADMIN — OXYCODONE HYDROCHLORIDE 5 MG: 5 TABLET ORAL at 16:05

## 2022-01-08 RX ADMIN — TACROLIMUS 3 MG: 0.5 CAPSULE ORAL at 17:32

## 2022-01-08 RX ADMIN — GADOBUTROL 4 ML: 604.72 INJECTION INTRAVENOUS at 14:19

## 2022-01-08 RX ADMIN — APIXABAN 5 MG: 5 TABLET, FILM COATED ORAL at 17:32

## 2022-01-08 RX ADMIN — ALUMINUM HYDROXIDE, MAGNESIUM HYDROXIDE, AND SIMETHICONE 30 ML: 200; 200; 20 SUSPENSION ORAL at 01:59

## 2022-01-08 RX ADMIN — OXYCODONE HYDROCHLORIDE 5 MG: 5 TABLET ORAL at 02:15

## 2022-01-08 RX ADMIN — HYDROXYZINE HYDROCHLORIDE 50 MG: 25 TABLET ORAL at 01:59

## 2022-01-08 RX ADMIN — PANCRELIPASE 48000 UNITS: 24000; 76000; 120000 CAPSULE, DELAYED RELEASE PELLETS ORAL at 16:33

## 2022-01-08 RX ADMIN — ACETAMINOPHEN 650 MG: 325 TABLET, FILM COATED ORAL at 01:59

## 2022-01-08 RX ADMIN — ACETAMINOPHEN 650 MG: 325 TABLET, FILM COATED ORAL at 22:43

## 2022-01-08 RX ADMIN — ATOVAQUONE 1500 MG: 750 SUSPENSION ORAL at 08:08

## 2022-01-08 RX ADMIN — PREGABALIN 150 MG: 75 CAPSULE ORAL at 20:06

## 2022-01-08 RX ADMIN — HYDROXYZINE HYDROCHLORIDE 50 MG: 25 TABLET ORAL at 20:04

## 2022-01-08 RX ADMIN — SODIUM CHLORIDE SOLN NEBU 3% 4 ML: 3 NEBU SOLN at 21:10

## 2022-01-08 RX ADMIN — CEFAZOLIN SODIUM 2000 MG: 2 SOLUTION INTRAVENOUS at 16:05

## 2022-01-08 RX ADMIN — HYDROCORTISONE SODIUM SUCCINATE 50 MG: 100 INJECTION, POWDER, FOR SOLUTION INTRAMUSCULAR; INTRAVENOUS at 16:06

## 2022-01-08 RX ADMIN — PIPERACILLIN AND TAZOBACTAM 4.5 G: 4; .5 INJECTION, POWDER, LYOPHILIZED, FOR SOLUTION INTRAVENOUS at 08:09

## 2022-01-08 RX ADMIN — PIPERACILLIN AND TAZOBACTAM 4.5 G: 4; .5 INJECTION, POWDER, LYOPHILIZED, FOR SOLUTION INTRAVENOUS at 01:52

## 2022-01-08 RX ADMIN — CEFAZOLIN SODIUM 2000 MG: 2 SOLUTION INTRAVENOUS at 23:53

## 2022-01-08 RX ADMIN — ACETAMINOPHEN 650 MG: 325 TABLET, FILM COATED ORAL at 18:30

## 2022-01-08 RX ADMIN — DAPTOMYCIN 350 MG: 500 INJECTION, POWDER, LYOPHILIZED, FOR SOLUTION INTRAVENOUS at 12:35

## 2022-01-08 RX ADMIN — SODIUM CHLORIDE, SODIUM GLUCONATE, SODIUM ACETATE, POTASSIUM CHLORIDE, MAGNESIUM CHLORIDE, SODIUM PHOSPHATE, DIBASIC, AND POTASSIUM PHOSPHATE 125 ML/HR: .53; .5; .37; .037; .03; .012; .00082 INJECTION, SOLUTION INTRAVENOUS at 06:13

## 2022-01-08 RX ADMIN — ALUMINUM HYDROXIDE, MAGNESIUM HYDROXIDE, AND SIMETHICONE 30 ML: 200; 200; 20 SUSPENSION ORAL at 12:35

## 2022-01-08 RX ADMIN — ALUMINUM HYDROXIDE, MAGNESIUM HYDROXIDE, AND SIMETHICONE 30 ML: 200; 200; 20 SUSPENSION ORAL at 23:52

## 2022-01-08 NOTE — PLAN OF CARE
Problem: INFECTION - ADULT  Goal: Absence or prevention of progression during hospitalization  Description: INTERVENTIONS:  - Assess and monitor for signs and symptoms of infection  - Monitor lab/diagnostic results  - Monitor all insertion sites, i e  indwelling lines, tubes, and drains  - Monitor endotracheal if appropriate and nasal secretions for changes in amount and color  - [place] appropriate cooling/warming therapies per order  - Administer medications as ordered  - Instruct and encourage patient and family to use good hand hygiene technique  - Identify and instruct in appropriate isolation precautions for identified infection/condition  Outcome: Progressing

## 2022-01-08 NOTE — ASSESSMENT & PLAN NOTE
· Blood pressure improved after volume resuscitation in the ED  · Continue maintenance IV fluids  · Continue IV Zosyn as recommended by transplant team at [facility]  · Consult to Infectious Disease placed  · Tylenol as needed for fevers  · Start stress dosing of steroids    Patient takes prednisone chronically  · Patient is also on atovaquone

## 2022-01-08 NOTE — ASSESSMENT & PLAN NOTE
- history of lung transplant secondary to cystic fibrosis  - follows with [facility]  - history of pneumonia with Pseudomonas    - continue empiric IV Zosyn for now; however patient has no respiratory symptoms and there is no consolidation on chest x-ray  - follow-up ID consultation  - continue home tacrolimus, stress dose steroids, for now  - continue prophylactic atovaquone and valacyclovir

## 2022-01-08 NOTE — ASSESSMENT & PLAN NOTE
- on long-term systemic steroids due to history of lung transplant  - currently on IV hydrocortisone given current sepsis/infection

## 2022-01-08 NOTE — CONSULTS
Consultation - Infectious Disease   [facility] Wilivineet 42 y o  female MRN: [id number]  Unit/Bed#: -01 Encounter: [id number]      IMPRESSION & RECOMMENDATIONS:   1  Sepsis, POA  Patient presented on admission with high fever along with leukopenia  Likely sources problem 2  No other obvious sources on exam   Fever curve currently down trending  White blood cell count stable today  Suspect low counts in the setting of acute illness  Most recent CMV PCR negative and no other localizing symptoms  Adjust antibiotics as below  Continue to trend fever curve/vitals  Repeat labs tomorrow  Repeat blood cultures tomorrow  Additional imaging as below  Anticipate port removal as below  Additional care as per primary  Additional interventions pending clinical course    2  Gram-positive bacteremia  Two of 2 blood cultures from admission have returned positive with Gram-positive cocci in clusters  Patient's only intravascular devices her port  CT chest, abdomen and pelvis largely unremarkable  Patient reporting some midthoracic right-sided back discomfort  Otherwise no other localizing findings on exam   Suspected port infection  Patient with severe vancomycin allergy  Fever curve appears to have already been improving with Zosyn  Possibly MSSA infection  Continue daptomycin, dose increased  Will discontinue Zosyn  Will add Ancef 2 g every 8 hours  Continue to trend fever curve/vitals  Repeat labs tomorrow  Repeat blood cultures tomorrow  2D echo ordered, may need to consider DEJUAN  Recommend MRI T-spine with and without contrast  Consult IR for port removal and temporary access placement  Would culture catheter tip once removed  Patient will eventually require port replacement  Additional care as per primary  Additional interventions pending clinical course    3  CF status post bilateral lung transplant 2013 and left lung re-transplant 2017   Patient has had multiple infectious complications and most recently required right-sided pneumonectomy and was treated for left-sided Mycobacterium abscesses infection  CMV mismatch and recent PCRs negative  Still on regular monitoring  Reportedly no change to immunosuppression otherwise  Continue chronic immunosuppression with tacrolimus and prednisone  Continue prophylaxis with atovaquone and Valtrex  Ongoing follow-up by Pulmonary  Continue antibiotics as above  Continue to trend fever curve/WBC  CMV monitoring as outpatient  Recommend regular updates to transplant center  Additional care as per primary    4  Malnutrition and diabetes  Supportive care as per primary  Above plan discussed in detail with the patient at bedside  Primary service attending updated of the above plan  ID consult service will re-evaluate this patient again over the weekend as needed  Please call if questions in the interim  HISTORY OF PRESENT ILLNESS:  Reason for Consult:  Gram positive bacteremia    HPI: Dulce Wilkins is a 42y o  year old female with CF, who underwent bilateral lung transplant in 2013 (CMV D-/R-) c/b ODALYS s/p left lung read transplant in 2017 (CMV D+/R-)  Patient has had multiple infectious complications including influenza in February 2020 with MRSA/Pseudomonas pneumonia in March 2020 and VDRF requiring ECMO, history of multiple other lower respiratory tract infections  Patient had admission in June 2021 for non COVID-19 coronavirus infection  Recent admission reviewed in Care everywhere at [address] [place] [address] in October  Patient was admitted to our facility at the end of September and then transferred as she was found to have progressively collapse and fibrotic right lung and over inflation of the left lung  She underwent right-sided pneumonectomy and purulent infected lung was removed  Cultures at that time isolated Pseudomonas and Candida glabrata and Candida albicans    She also underwent bronchoscopy on the left side and cultures ultimately isolated M abscesses  Patient was ultimately initiated on broad coverage to cover all of these pathogens  She is maintained chronically on prophylaxis with atovaquone and Valtrex  Patient was ultimately discharged from rehab facility on November 19  Prior to discharge she had repeat CT scans in the beginning of November which showed resolution of left lower lobe pneumonia  Antibiotics were ultimately discontinued on 11/15 after about 4 and half weeks of therapy  She remains on immunosuppression with prednisone and tacrolimus  CMV levels were being monitored weekly  Patient was noted at that time to still have a port in place  Antibiotic allergies reviewed  Documented is tolerating cefepime previously  Recent outpatient pulmonary visit reviewed from December  Patient presented to the hospital this time with fevers  Reportedly had a mild cough and associated chills  She was noted to have leukopenia  LFTs were elevated  No acute findings noted on CT chest/abdomen/pelvis  X-ray unremarkable  Patient was admitted for sepsis presumably from pneumonia and started on antibiotics  No other acute events noted overnight  Fevers have declined since admission  Two of 2 blood cultures have returned positive  Patient's other vitals stable this morning  No new labs this morning  Prior ID evaluations reviewed  We are consulted at this time for further assistance in management  On evaluation, patient currently denies having any nausea, vomiting, chest pain or shortness of breaths  She reports having a very mild cough  She notes that she had her port accessed recently on Wednesday for labs  Her current port is maintained due to access issues  She has denied any major issues with that otherwise  Denies having any other prosthetic devices  She was feeling fine otherwise except for the fevers which persisted and prompted evaluation  She reports reaction to ceftazidime was 10 years ago    As chronic and constant pain in her lower legs  She does report having some mild right-sided midthoracic discomfort in the back which seems new from baseline  No other complaints to report  Tolerated Zosyn without issue  Agreeable to addition of Ancef  Reviewed that there is no cross reactivity between vancomycin and daptomycin  Medications reviewed with clinical ID pharmacist as well  REVIEW OF SYSTEMS:  A complete 12 point system-based review of systems is negative other than that noted in the HPI  PAST MEDICAL HISTORY:  Past Medical History:   Diagnosis Date    [facility]osis[facility] 7/8/2012    Acute kidney injury ([facility] ) 9/8/2017    Acute on chronic respiratory failure ([facility] ) 3/4/2017    Last Assessment & Plan:  Formatting of this note might be different from the original  Recurrent with clinical worsening with enlarging PTX, s/p multiple chest tubes, but new fevers 6/19 and worsening leukocytosis indicating infection as cause of 6/19 decompensation with profound hypoxia in setting of shunting through right lung and continued pneumothorax s/p 2nd chest tube placement  6/27: Right    Asthma     Chronic pain     C[facility]     Diabetes mellitus ([facility]     GERD (gastroesophageal reflux disease)     HBP (high blood pressure)     HCAP (healthcare-associated pneumonia) 3/6/2020    Influenza B 2/23/2020    Kidney stone     Neutropenic fever ([facility] ) 9/26/2020    PONV (postoperative nausea and vomiting)     Pseudomonas aeruginosa infection 9/20/2012    Transplant recipient     Lung- 2013, 2017     Past Surgical History:   Procedure Laterality Date    APPENDECTOMY      BREAST SURGERY      Aug      BRONCHOSCOPY      CHOLECYSTECTOMY      COLONOSCOPY      EGD  07/2019    Yeast Infect       FL RETROGRADE PYELOGRAM  1/9/2020    FL RETROGRADE PYELOGRAM  2/4/2020    GASTROSTOMY TUBE, PLACE      LUNG SURGERY      LUNG TRANSPLANT      X2    TX CYSTO/URETERO W/LITHOTRIPSY &INDWELL STENT INSRT Right 2020    Procedure: CYSTOSCOPY URETEROSCOPY WITH LITHOTRIPSY HOLMIUM LASER, RETROGRADE PYELOGRAM AND INSERTION STENT URETERAL;  Surgeon: Maryan Starkey MD;  Location: [facility];  Service: Urology    [place] CYSTOURETHROSCOPY,URETER CATHETER Right 2020    Procedure: CYSTOSCOPY RETROGRADE PYELOGRAM WITH INSERTION STENT URETERAL;  Surgeon: Maryan Stareky MD;  Location: [facility];  Service: Urology    US GUIDED VASCULAR ACCESS  2017       FAMILY HISTORY:  Non-contributory    SOCIAL HISTORY:  Social History   Social History     Substance and Sexual Activity   Alcohol Use Yes    Comment: social     Social History     Substance and Sexual Activity   Drug Use Yes    Frequency: 2 0 times per week    Types: Marijuana    Comment: medical edible marijuana prescribed     Social History     Tobacco Use   Smoking Status Former Smoker    Packs/day: 0 50    Years: 12 00    Pack years: 6 00   Smokeless Tobacco Never Used       ALLERGIES:  Allergies   Allergen Reactions    Vancomycin Angioedema and Hives    Ceftazidime Headache     Severe headaches after desensitization       MEDICATIONS:  All current active medications have been reviewed  PHYSICAL EXAM:  Temp:  [97 4 °F (36 3 °C)-103 2 °F (39 6 °C)] 97 4 °F (36 3 °C)  HR:  [71-93] 71  Resp:  [15-26] 17  BP: ()/(45-63) 111/63  SpO2:  [90 %-96 %] 95 %  Temp (24hrs), Av 2 °F (37 9 °C), Min:97 4 °F (36 3 °C), Max:103 2 °F (39 6 °C)  Current: Temperature: (!) 97 4 °F (36 3 °C)    Intake/Output Summary (Last 24 hours) at 2022 1026  Last data filed at 2022 6847  Gross per 24 hour   Intake 5237 08 ml   Output 400 ml   Net 4837 08 ml       General Appearance:  Chronically ill-appearing, nontoxic, and in no distress; patient is able to provide adequate history     Head:  Normocephalic, without obvious abnormality, atraumatic   Eyes:  Conjunctiva pink and sclera anicteric, both eyes   Nose: Nares normal, mucosa normal, no drainage   Throat: Oropharynx moist without lesions   Neck: Supple, symmetrical, no adenopathy, no tenderness/mass/nodules   Back:   Symmetric, no curvature, ROM normal, no CVA tenderness; she has some mild discomfort when I palpate down to the midthoracic area more towards the right in the paraspinal region  Lungs:   Clear breath sounds appreciated on the left, no wheezes, rales or rhonchi  Chest Wall:  No tenderness or deformity; port currently accessed  Catheter track visualized  Unable to appreciate any obvious erythema at the insertion site of the port   Heart:  RRR; no murmur, rub or gallop appreciated   Abdomen:   Soft, non-tender, non-distended, positive bowel sounds    Extremities: No cyanosis, clubbing or edema; muscle atrophy noted  Patient is tender with minimal palpation to the legs  Skin: No rashes or lesions  No draining wounds noted  Lymph nodes: Cervical, supraclavicular nodes normal   Neurologic: Alert and oriented times 3, extremity strength 5/5 and symmetric       LABS, IMAGING, & OTHER STUDIES:  Lab Results:  I have personally reviewed pertinent labs  Results from last 7 days   Lab Units 01/07/22  0921   WBC Thousand/uL 2 94*   HEMOGLOBIN g/dL 8 1*   PLATELETS Thousands/uL 169     Results from last 7 days   Lab Units 01/07/22  0921   POTASSIUM mmol/L 3 8   CHLORIDE mmol/L 103   CO2 mmol/L 29   BUN mg/dL 22   CREATININE mg/dL 1 10   EGFR ml/min/1 73sq m 61   CALCIUM mg/dL 7 9*   AST U/L 59*   ALT U/L 160*   ALK PHOS U/L 283*     Results from last 7 days   Lab Units 01/07/22  0921   GRAM STAIN RESULT  Gram positive cocci in clusters*  Gram positive cocci in clusters*       Imaging Studies:   I have personally reviewed pertinent imaging study reports and images in PACS  Other Studies:   I have personally reviewed pertinent reports

## 2022-01-08 NOTE — PROGRESS NOTES
[facility]  Progress Note Nellie Gates Marcia 1975, 42 y o  female MRN: [id number]  Unit/Bed#: -01 Encounter: [id number]  Primary Care Provider: Mamie Rush MD   Date and time admitted to hospital: 1/7/2022  8:07 AM    Gram-positive cocci bacteremia  Assessment & Plan  - 2/2 sets positive for Gram-positive cocci on admission  - source is not entirely clear at this time imaging of the chest and abdomen/pelvis was unrevealing  She has no open skin lesions  No recent trauma    - possible source could be patient's port though superficially looks fine    - start on IV daptomycin; patient has a severe anaphylactic reaction to vancomycin  - monitor WBC count and fever curve  - repeat blood cultures tomorrow  - check echocardiogram  - follow-up ID consultation  - consider port removal pending further results or persistent bacteremia    Lung transplant status, bilateral ([facility] )  Assessment & Plan  - history of lung transplant secondary to cystic fibrosis  - follows with [facility]  - history of pneumonia with Pseudomonas    - continue empiric IV Zosyn for now; however patient has no respiratory symptoms and there is no consolidation on chest x-ray  - follow-up ID consultation  - continue home tacrolimus, stress dose steroids, for now  - continue prophylactic atovaquone and valacyclovir    * Sepsis ([facility] )  Assessment & Plan  - met criteria on admission with tachycardia, tachypnea, fever, hypotension, and now with Gram-positive bacteremia    - continue IVF at 100 cc/hour  - currently on broad-spectrum antibiotics  - monitor WBC count and fever curve  - follow-up blood cultures      Cystic fibrosis (HCC)  Assessment & Plan  - History noted    Severe protein-calorie malnutrition (HCC)  Assessment & Plan  - nutrition evaluation    Chronic kidney disease  Assessment & Plan  - renal function at baseline; continue to monitor    Long term current use of systemic steroids  Assessment & Plan  - on long-term systemic steroids due to history of lung transplant  - currently on IV hydrocortisone given current sepsis/infection    DVT (deep venous thrombosis) ([facility])  Assessment & Plan  - Continue Eliquis    Chronic pain disorder  Assessment & Plan  - Continue home meds    Asthma  Assessment & Plan  - Continue duo nebs as needed    Pancreatic insufficiency  Assessment & Plan  - history of cystic fibrosis  - Continue pancrelipase    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, with long-term current use of insulin ([facility] )  Assessment & Plan  - last hemoglobin A1c on record in 2021 showed hemoglobin A1c of 6 1%  - monitor on ISS for now        VTE Pharmacologic Prophylaxis:   High Risk (Score >/= 5) - Pharmacological DVT Prophylaxis Ordered: apixaban (Eliquis)  Sequential Compression Devices Ordered  Patient Centered Rounds: I performed bedside rounds with nursing staff today  Discussions with Specialists or Other Care Team Provider:  Case management    Education and Discussions with Family / Patient: Patient declined call to   Time Spent for Care: 30 minutes  More than 50% of total time spent on counseling and coordination of care as described above  Current Length of Stay: 1 day(s)  Current Patient Status: Inpatient   Certification Statement: The patient will continue to require additional inpatient hospital stay due to Gram-positive bacteremia, sepsis  Discharge Plan: Anticipate discharge in >72 hrs to home  Code Status: Level 1 - Full Code    Subjective:   Patient seen examined  Following up for sepsis  Found to have Gram-positive bacteremia overnight  To be started on daptomycin today  Last fever was yesterday  Patient denies any respiratory complaints  Seems quite comfortable during my evaluation      Objective:     Vitals:   Temp (24hrs), Av 2 °F (37 9 °C), Min:97 4 °F (36 3 °C), Max:103 2 °F (39 6 °C)    Temp:  [97 4 °F (36 3 °C)-103 2 °F (39 6 °C)] 97 4 °F (36 3 °C)  HR:  [71-93] 71  Resp:  [15-26] 17  BP: ()/(45-63) 111/63  SpO2:  [90 %-96 %] 95 %  Body mass index is 17 7 kg/m²  Input and Output Summary (last 24 hours): Intake/Output Summary (Last 24 hours) at 1/8/2022 0904  Last data filed at 1/8/2022 8869  Gross per 24 hour   Intake 5237 08 ml   Output 400 ml   Net 4837 08 ml       Physical Exam:   PHYSICAL EXAM:    Vitals signs reviewed  Constitutional   Awake and cooperative  NAD  Head/Neck   Normocephalic  Atraumatic  HEENT   No scleral icterus  EOMI  Heart  Chest wall   Regular rate and rhythm  No murmers  Port in right anterior upper chest wall  No surrounding erythema, fluctuance, or drainage  Lungs     Clear to auscultation bilaterally  Respirations unlaboured  Abdomen   Soft  Nontender  Nondistended  Skin   Skin color normal  No rashes  Extremities   No deformities  No peripheral edema  Neuro   Alert and oriented  No new deficits  Psych   Mood stable   Affect normal          Additional Data:     Labs:  Results from last 7 days   Lab Units 01/07/22  0921   WBC Thousand/uL 2 94*   HEMOGLOBIN g/dL 8 1*   HEMATOCRIT % 28 9*   PLATELETS Thousands/uL 169   NEUTROS PCT % 65   LYMPHS PCT % 22   MONOS PCT % 11   EOS PCT % 1     Results from last 7 days   Lab Units 01/07/22  0921   SODIUM mmol/L 140   POTASSIUM mmol/L 3 8   CHLORIDE mmol/L 103   CO2 mmol/L 29   BUN mg/dL 22   CREATININE mg/dL 1 10   ANION GAP mmol/L 8   CALCIUM mg/dL 7 9*   ALBUMIN g/dL 2 5*   TOTAL BILIRUBIN mg/dL 0 39   ALK PHOS U/L 283*   ALT U/L 160*   AST U/L 59*   GLUCOSE RANDOM mg/dL 122     Results from last 7 days   Lab Units 01/07/22  0921   INR  1 07     Results from last 7 days   Lab Units 01/08/22  0639 01/08/22  0109   POC GLUCOSE mg/dl 128 162*         Results from last 7 days   Lab Units 01/07/22  0921   LACTIC ACID mmol/L 1 6   PROCALCITONIN ng/ml 0 21       Lines/Drains:  Invasive Devices  Report    Central Venous Catheter Line Port A Cath Right Chest -- days          Peripheral Intravenous Line            Peripheral IV 01/07/22 Left Forearm <1 day          Drain            Ureteral Drain/Stent Right ureter 6 Fr  703 days                Central Line:  Goal for removal: N/A - Chronic PICC             Imaging: Reviewed radiology reports from this admission including: chest xray and abdominal/pelvic CT    Recent Cultures (last 7 days):   Results from last 7 days   Lab Units 01/07/22  0921   GRAM STAIN RESULT  Gram positive cocci in clusters*  Gram positive cocci in clusters*       Last 24 Hours Medication List:   Current Facility-Administered Medications   Medication Dose Route Frequency Provider Last Rate    acetaminophen  650 mg Oral Q4H PRN Kathleen Jason MD      ALPRAZolam  1 mg Oral BID Kathleen Jason MD      aluminum-magnesium hydroxide-simethicone  30 mL Oral Q4H PRN WAYNE Patel      apixaban  5 mg Oral BID Kathleen Jason MD      atovaquone  1,500 mg Oral Daily Kathleen Jason MD      DAPTOmycin  6 mg/kg Intravenous Q24H Shama Mallory DO      hydrocortisone sodium succinate  50 mg Intravenous [facility] Kathleen Jason MD      hydrOXYzine HCL  50 mg Oral Q6H PRN WAYNE Patel      insulin lispro  1-5 Units Subcutaneous TID [facility] Kathleen Jason MD      lidocaine  2 patch Topical Daily Kathleen Jason MD      multi-electrolyte  100 mL/hr Intravenous Continuous Cara Delbarton Starsinic,  mL/hr (01/08/22 0809)    oxyCODONE  5 mg Oral Q6H PRN WAYNE Patel      pancrelipase (Lip-Prot-Amyl)  24,000 Units Oral TID With Meals Gissell Sanchez MD      pantoprazole  40 mg Oral Early Morning Kathleen Jason MD      piperacillin-tazobactam  4 5 g Intravenous Q6H Weston Kohler, DO 4 5 g (01/08/22 0809)    polyethylene glycol  17 g Oral BID PRN Kathleen Jason MD      pregabalin  150 mg Oral TID Kathleen Jason MD      tacrolimus  3 mg Oral BID Edie Mix Tristan Ayon MD      traZODone  25 mg Oral HS Rui Landrum MD          Today, Patient Was Seen By: Nadine Gunderson DO    **Please Note: This note may have been constructed using a voice recognition system  **

## 2022-01-08 NOTE — ASSESSMENT & PLAN NOTE
· Likely secondary to recurrent pneumonia  · Patient is also immunocompromised  · As recommended by transplant team at [facility] will continue IV Zosyn  · Infectious Disease consultation placed  · Tylenol as needed for fevers

## 2022-01-08 NOTE — ASSESSMENT & PLAN NOTE
Lab Results   Component Value Date    HGBA1C 6 3 (H) 11/08/2021       No results for input(s): POCGLU in the last 72 hours      Blood Sugar Average: Last 72 hrs:     · Start insulin sliding scale

## 2022-01-08 NOTE — ASSESSMENT & PLAN NOTE
- last hemoglobin A1c on record in November 2021 showed hemoglobin A1c of 6 1%  - monitor on ISS for now

## 2022-01-08 NOTE — PLAN OF CARE
Problem: RESPIRATORY - ADULT  Goal: Achieves optimal ventilation and oxygenation  Description: INTERVENTIONS:  - Assess for changes in respiratory status  - Assess for changes in mentation and behavior  - Position to facilitate oxygenation and minimize respiratory effort  - Oxygen administered by appropriate delivery if ordered  - Initiate smoking cessation education as indicated  - Encourage broncho-pulmonary hygiene including cough, deep breathe, Incentive Spirometry  - Assess the need for suctioning and aspirate as needed  - Assess and instruct to report SOB or any respiratory difficulty  - Respiratory Therapy support as indicated  Outcome: Progressing     Problem: Nutrition/Hydration-ADULT  Goal: Nutrient/Hydration intake appropriate for improving, restoring or maintaining nutritional needs  Description: Monitor and assess patient's nutrition/hydration status for malnutrition  Collaborate with interdisciplinary team and initiate plan and interventions as ordered  Monitor patient's weight and dietary intake as ordered or per policy  Utilize nutrition screening tool and intervene as necessary  Determine patient's food preferences and provide high-protein, high-caloric foods as appropriate       INTERVENTIONS:  - Monitor oral intake, urinary output, labs, and treatment plans  - Assess nutrition and hydration status and recommend course of action  - Evaluate amount of meals eaten  - Assist patient with eating if necessary   - Allow adequate time for meals  - Recommend/ encourage appropriate diets, oral nutritional supplements, and vitamin/mineral supplements  - Order, calculate, and assess calorie counts as needed  - Recommend, monitor, and adjust tube feedings and TPN/PPN based on assessed needs  - Assess need for intravenous fluids  - Provide specific nutrition/hydration education as appropriate  - Include patient/family/caregiver in decisions related to nutrition  Outcome: Progressing

## 2022-01-08 NOTE — ASSESSMENT & PLAN NOTE
- met criteria on admission with tachycardia, tachypnea, fever, hypotension, and now with Gram-positive bacteremia    - continue IVF at 100 cc/hour  - currently on broad-spectrum antibiotics  - monitor WBC count and fever curve  - follow-up blood cultures

## 2022-01-08 NOTE — PLAN OF CARE
Problem: Potential for Falls  Goal: Patient will remain free of falls  Description: INTERVENTIONS:  - Educate patient/family on patient safety including physical limitations  - Instruct patient to call for assistance with activity   - Consult OT/PT to assist with strengthening/mobility   - Keep Call bell within reach  - Keep bed low and locked with side rails adjusted as appropriate  - Keep care items and personal belongings within reach  - Initiate and maintain comfort rounds  - Make Fall Risk Sign visible to staff  - Offer Toileting every 2 Hours, in advance of need  - Initiate/Maintain alarm  - Obtain necessary fall risk management equipment  - Apply yellow socks and bracelet for high fall risk patients  - Consider moving patient to room near nurses station  Outcome: Progressing     Problem: MOBILITY - ADULT  Goal: Maintain or return to baseline ADL function  Description: INTERVENTIONS:  -  Assess patient's ability to carry out ADLs; assess patient's baseline for ADL function and identify physical deficits which impact ability to perform ADLs (bathing, care of mouth/teeth, toileting, grooming, dressing, etc )  - Assess/evaluate cause of self-care deficits   - Assess range of motion  - Assess patient's mobility; develop plan if impaired  - Assess patient's need for assistive devices and provide as appropriate  - Encourage maximum independence but intervene and supervise when necessary  - Involve family in performance of ADLs  - Assess for home care needs following discharge   - Consider OT consult to assist with ADL evaluation and planning for discharge  - Provide patient education as appropriate  Outcome: Progressing  Goal: Maintains/Returns to pre admission functional level  Description: INTERVENTIONS:  - Perform BMAT or MOVE assessment daily    - Set and communicate daily mobility goal to care team and patient/family/caregiver     - Collaborate with rehabilitation services on mobility goals if consulted  - Perform Range of Motion 3 times a day  - Reposition patient every 2 hours    - Dangle patient 3 times a day  - Stand patient 3 times a day  - Ambulate patient 3 times a day  - Out of bed to chair 3 times a day   - Out of bed for meals 3 times a day  - Out of bed for toileting  - Record patient progress and toleration of activity level   Outcome: Progressing     Problem: PAIN - ADULT  Goal: Verbalizes/displays adequate comfort level or baseline comfort level  Description: Interventions:  - Encourage patient to monitor pain and request assistance  - Assess pain using appropriate pain scale  - Administer analgesics based on type and severity of pain and evaluate response  - Implement non-pharmacological measures as appropriate and evaluate response  - Consider cultural and social influences on pain and pain management  - Notify physician/advanced practitioner if interventions unsuccessful or patient reports new pain  Outcome: Progressing     Problem: INFECTION - ADULT  Goal: Absence or prevention of progression during hospitalization  Description: INTERVENTIONS:  - Assess and monitor for signs and symptoms of infection  - Monitor lab/diagnostic results  - Monitor all insertion sites, i e  indwelling lines, tubes, and drains  - Monitor endotracheal if appropriate and nasal secretions for changes in amount and color  - [place] appropriate cooling/warming therapies per order  - Administer medications as ordered  - Instruct and encourage patient and family to use good hand hygiene technique  - Identify and instruct in appropriate isolation precautions for identified infection/condition  Outcome: Progressing  Goal: Absence of fever/infection during neutropenic period  Description: INTERVENTIONS:  - Monitor WBC    Outcome: Progressing     Problem: DISCHARGE PLANNING  Goal: Discharge to home or other facility with appropriate resources  Description: INTERVENTIONS:  - Identify barriers to discharge w/patient and caregiver  - Arrange for needed discharge resources and transportation as appropriate  - Identify discharge learning needs (meds, wound care, etc )  - Arrange for interpretive services to assist at discharge as needed  - Refer to Case Management Department for coordinating discharge planning if the patient needs post-hospital services based on physician/advanced practitioner order or complex needs related to functional status, cognitive ability, or social support system  Outcome: Progressing     Problem: Knowledge Deficit  Goal: Patient/family/caregiver demonstrates understanding of disease process, treatment plan, medications, and discharge instructions  Description: Complete learning assessment and assess knowledge base    Interventions:  - Provide teaching at level of understanding  - Provide teaching via preferred learning methods  Outcome: Progressing

## 2022-01-08 NOTE — H&P
[address] [place] [address]  H&P- [address] [place] [address] 1975, 42 y o  female MRN: [id number]  Unit/Bed#: ED 28 Encounter: [id number]  Primary Care Provider: Pau Ashraf MD   Date and time admitted to hospital: 1/7/2022  8:07 AM         [facility] - History and Physical:       Sebas Fortune 42 y o  female MRN: [id number]  Unit/Bed#: ED 29 Encounter: [id number]  Admitting Physician: Chance Zamora MD  PCP: Pau Ashraf MD  Date of Admission:  01/07/22        Assessment and Plan:     * Sepsis due to pneumonia [facility])  Assessment & Plan  · Blood pressure improved after volume resuscitation in the ED  · Continue maintenance IV fluids  · Continue IV Zosyn as recommended by transplant team at [facility]  · Consult to Infectious Disease placed  · Tylenol as needed for fevers  · Start stress dosing of steroids  Patient takes prednisone chronically  · Patient is also on atovaquone    Lung transplant status, bilateral ([facility] )  Assessment & Plan  · Follow-up with transplant team after discharge  · Continue tacrolimus    Pancreatic insufficiency  Assessment & Plan  · Continue pancrelipase    Cystic fibrosis ([facility] )  Assessment & Plan  · History noted    Severe protein-calorie malnutrition (HCC)  Assessment & Plan  Malnutrition Findings:           BMI Findings: Body mass index is 18 39 kg/m²       · Nutritionist evaluation    Chronic kidney disease  Assessment & Plan  Lab Results   Component Value Date    EGFR 61 01/07/2022    EGFR 48 12/02/2021    EGFR 51 09/22/2021    CREATININE 1 10 01/07/2022    CREATININE 1 35 (H) 12/02/2021    CREATININE 1 28 09/22/2021     · Monitor for now    Anxiety  Assessment & Plan  · Continue Xanax    Long term current use of systemic steroids  Assessment & Plan  · Noted    DVT (deep venous thrombosis) (HCC)  Assessment & Plan  · Continue Eliquis    Chronic pain disorder  Assessment & Plan  · Continue home meds    Asthma  Assessment & Plan  · Continue duo nebs as needed    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, with long-term current use of insulin (HCC)  Assessment & Plan  Lab Results   Component Value Date    HGBA1C 6 3 (H) 11/08/2021       No results for input(s): POCGLU in the last 72 hours  Blood Sugar Average: Last 72 hrs:     · Start insulin sliding scale            VTE Prophylaxis: Apixaban (Eliquis)  / sequential compression device   Code Status: full    Anticipated Length of Stay:  Patient will be admitted on an Inpatient basis with an anticipated length of stay of  2 midnights  Justification for Hospital Stay:  Sepsis    Total Time for Visit, including Counseling / Coordination of Care: 30 minutes  Greater than 50% of this total time spent on direct patient counseling and coordination of care  Chief Complaint:   Fevers    History of Present Illness:    Ashleigh Duran is a 42 y o  female who presented to the ED for evaluation of fever  She stated that last night she had a fever of 106° F  She also reported some generalized weakness any mild cough  She also had associated chills  Her past medical history is significant for cystic fibrosis and status post lung transplant in 2013 and 2017, currently on immunosuppressive therapy, status post right pneumonectomy in late September 2021 at [facility]  She also has history of allergic bronchopulmonary aspergillosishe  The patient normally follows up with specialists at [place]  Initially there were plans to transfer the patient to [place]  However, due to bed availability KIANNA Mcdonnell recommended that the patient be admitted here and be treated with IV antibiotics  The transplant team stated that they will not do anything differently there for the patient  Transplant team recommended IV Zosyn for antibiotic regimen  The patient stated that she has been vaccinated for COVID-19  Review of Systems:    Review of Systems   Constitutional: Positive for fever     HENT: Negative  Eyes: Negative  Respiratory: Positive for cough  Cardiovascular: Negative  Gastrointestinal: Negative  Endocrine: Negative  Genitourinary: Negative  Musculoskeletal: Negative  Skin: Negative  Allergic/Immunologic: Negative  Neurological: Negative  Psychiatric/Behavioral: Negative  Past Medical and Surgical History:     Past Medical History:   Diagnosis Date    ABPA [facility]allergic [facility]opulmonary aspergillosis) [facility] ) 7/8/2012    Acute kidney injury ([facility] ) 9/8/2017    Acute on chronic respiratory failure ([facility] ) 3/4/2017    Last Assessment & Plan:  Formatting of this note might be different from the original  Recurrent with clinical worsening with enlarging PTX, s/p multiple chest tubes, but new fevers 6/19 and worsening leukocytosis indicating infection as cause of 6/19 decompensation with profound hypoxia in setting of shunting through right lung and continued pneumothorax s/p 2nd chest tube placement  6/27: Right    Asthma     Chronic pain     Cystic fibrosis ([facility] )     Diabetes mellitus ([facility] )     GERD (gastroesophageal reflux disease)     HBP (high blood pressure)     HCAP (healthcare-associated pneumonia) 3/6/2020    Influenza B 2/23/2020    Kidney stone     Neutropenic fever ([facility] ) 9/26/2020    PONV (postoperative nausea and vomiting)     Pseudomonas aeruginosa infection 9/20/2012    Transplant recipient     Lung- 2013, 2017       Past Surgical History:   Procedure Laterality Date    APPENDECTOMY      BREAST SURGERY      Aug      BRONCHOSCOPY      CHOLECYSTECTOMY      COLONOSCOPY      EGD  07/2019    Yeast Infect       FL RETROGRADE PYELOGRAM  1/9/2020    FL RETROGRADE PYELOGRAM  2/4/2020    GASTROSTOMY TUBE, PLACE      LUNG SURGERY      LUNG TRANSPLANT      X2    FL CYSTO/URETERO W/LITHOTRIPSY &INDWELL STENT INSRT Right 2/4/2020    Procedure: CYSTOSCOPY URETEROSCOPY WITH LITHOTRIPSY HOLMIUM LASER, RETROGRADE PYELOGRAM AND INSERTION STENT URETERAL;  Surgeon: Jonnathan Dillon MD;  Location: [facility];  Service: Urology    [place] CYSTOURETHROSCOPY,URETER CATHETER Right 1/9/2020    Procedure: CYSTOSCOPY RETROGRADE PYELOGRAM WITH INSERTION STENT URETERAL;  Surgeon: Jonnathan Dillon MD;  Location: [facility];  Service: Urology   [address] [place] [address]  1/17/2017       Meds/Allergies:    Prior to Admission medications    Medication Sig Start Date End Date Taking?  Authorizing Provider   acetaminophen (TYLENOL) 325 mg tablet Take 3 tablets (975 mg total) by mouth every 8 (eight) hours 9/26/21   WAYNE Paredes   acetylcysteine (MUCOMYST) 200 mg/mL nebulizer solution Take 2 mL (400 mg total) by nebulization 2 (two) times a day 9/26/21   WAYNE Paredes   albuterol (2 5 mg/3 mL) 0 083 % nebulizer solution Take 6 mL (5 mg total) by nebulization 2 (two) times a day 9/26/21   WAYNE Paredes   ALPRAZolam Tejinder Lions) 1 mg tablet Take 1 tablet (1 mg total) by mouth 2 (two) times a day 1/3/22   Jose Taylor MD   apixaban (ELIQUIS) 5 mg Take 1 tablet (5 mg total) by mouth 2 (two) times a day 9/26/21   WAYNE Paredes   atovaquone (MEPRON) 750 mg/5 mL suspension Take 1,500 mg by mouth daily    Historical Provider, MD   capsicum (ZOSTRIX) 0 075 % topical cream Apply topically 3 (three) times a day 12/10/21   Janie Nunez MD   dronabinol (MARINOL) 5 MG capsule TAKE 1 CAPSULE (5 MG TOTAL) BY MOUTH 3 (THREE) TIMES A DAY 8/19/21   Joann Falk MD   famotidine (PEPCID) 20 mg tablet Take 20 mg by mouth 2 (two) times a day    Historical Provider, MD   insulin lispro (HumaLOG) 100 units/mL injection Inject 4 Units under the skin 3 (three) times a day with meals 9/26/21   WAYNE Paredes   lidocaine (LIDODERM) 5 % Apply 2 patches topically daily Remove & Discard patch within 12 hours or as directed by MD 9/26/21   WAYNE Schreiber   oxyCODONE (ROXICODONE) 10 MG TABS Take 10 mg by mouth 9/20/21   Historical Provider, MD   pancrelipase, Lip-Prot-Amyl, (CREON) 24,000 units Take 48,000 Units by mouth 3 (three) times a day with meals    Historical Provider, MD   pantoprazole (PROTONIX) 40 mg tablet Take 1 tablet (40 mg total) by mouth daily in the early morning 9/26/21   WAYNE Paredes   polyethylene glycol (MIRALAX) 17 g packet Take 17 g by mouth 2 (two) times a day as needed    Historical Provider, MD   predniSONE 10 mg tablet Take 10 mg by mouth daily 4/27/21   Historical Provider, MD   pregabalin (LYRICA) 75 mg capsule Take 150 mg by mouth Three times a day   9/14/21   Historical Provider, MD   Respiratory Therapy Supplies (Nebulizer) SUKHI Please dispense nebulizer machine 7/16/21   Historical Provider, MD   sodium chloride 3 % inhalation solution Inhale 4 mL 2 (two) times a day 7/1/21   Historical Provider, MD   SYRINGE-NEEDLE, DISP, 3 ML 23G X 1" 3 ML MISC Use twice daily to draw up acetylcysteine 6/30/21   Historical Provider, MD   tacrolimus (PROGRAF) 1 mg capsule Take 3 mg by mouth 2 (two) times a day 7/1/21   Historical Provider, MD   traZODone (DESYREL) 50 mg tablet Take 0 5 tablets (25 mg total) by mouth daily at bedtime 9/26/21   WAYNE Paredes   valACYclovir (VALTREX) 500 mg tablet Take 500 mg by mouth daily 6/1/21   Historical Provider, MD     I have reviewed home medications with patient personally  Allergies:    Allergies   Allergen Reactions    Vancomycin Angioedema and Hives    Ceftazidime Headache     Severe headaches after desensitization       Social History:     Marital Status: /Civil Union     Social History     Substance and Sexual Activity   Alcohol Use Yes    Comment: social     Social History     Tobacco Use   Smoking Status Former Smoker    Packs/day: 0 50    Years: 12 00    Pack years: 6 00   Smokeless Tobacco Never Used     Social History     Substance and Sexual Activity   Drug Use Yes    Frequency: 2 0 times per week    Types: Marijuana    Comment: medical edible marijuana prescribed Family History:    non-contributory    Physical Exam:     Vitals:   Blood Pressure: 113/58 (01/07/22 1600)  Pulse: 93 (01/07/22 1600)  Temperature: (!) 103 2 °F (39 6 °C) (01/07/22 1600)  Temp Source: Oral (01/07/22 1600)  Respirations: 16 (01/07/22 1600)  Weight - Scale: 45 6 kg (100 lb 8 5 oz) (01/07/22 0811)  SpO2: 93 % (01/07/22 1600)    Physical Exam  Constitutional:       Appearance: Normal appearance  HENT:      Head: Normocephalic and atraumatic  Eyes:      Extraocular Movements: Extraocular movements intact  Pupils: Pupils are equal, round, and reactive to light  Cardiovascular:      Rate and Rhythm: Normal rate and regular rhythm  Pulmonary:      Effort: Pulmonary effort is normal       Breath sounds: Normal breath sounds  Abdominal:      General: Bowel sounds are normal       Palpations: Abdomen is soft  Musculoskeletal:         General: Normal range of motion  Cervical back: Neck supple  Skin:     General: Skin is warm and dry  Neurological:      Mental Status: She is alert and oriented to person, place, and time  Psychiatric:         Mood and Affect: Mood normal          Additional Data:     Lab Results: I have personally reviewed pertinent reports        Results from last 7 days   Lab Units 01/07/22  0921   WBC Thousand/uL 2 94*   HEMOGLOBIN g/dL 8 1*   HEMATOCRIT % 28 9*   PLATELETS Thousands/uL 169   NEUTROS PCT % 65   LYMPHS PCT % 22   MONOS PCT % 11   EOS PCT % 1     Results from last 7 days   Lab Units 01/07/22  0921   SODIUM mmol/L 140   POTASSIUM mmol/L 3 8   CHLORIDE mmol/L 103   CO2 mmol/L 29   BUN mg/dL 22   CREATININE mg/dL 1 10   ANION GAP mmol/L 8   CALCIUM mg/dL 7 9*   ALBUMIN g/dL 2 5*   TOTAL BILIRUBIN mg/dL 0 39   ALK PHOS U/L 283*   ALT U/L 160*   AST U/L 59*   GLUCOSE RANDOM mg/dL 122     Results from last 7 days   Lab Units 01/07/22  0921   INR  1 07             Results from last 7 days   Lab Units 01/07/22  0921   LACTIC ACID mmol/L 1 6 Imaging: I have personally reviewed pertinent reports  CT chest abdomen pelvis wo contrast   Final Result by Abiel Monae DO (01/07 1127)      No acute inflammatory process identified  Findings suggestive of esophageal reflux and esophagitis  Consider upper endoscopy when clinically appropriate, if not recently performed  Additional nonacute findings, as described, unchanged  Workstation performed: [id number]         XR chest 1 view portable   Final Result by Hira Castro MD (01/07 0919)      Stable postoperative appearance of the right thorax post pneumonectomy  Left lung is clear                  Workstation performed: [id number]             Allscripts / Epic Records Reviewed: Yes     ** Please Note: This note has been constructed using a voice recognition system   **

## 2022-01-08 NOTE — ASSESSMENT & PLAN NOTE
Malnutrition Findings:           BMI Findings: Body mass index is 18 39 kg/m²       · Nutritionist evaluation

## 2022-01-08 NOTE — ASSESSMENT & PLAN NOTE
Lab Results   Component Value Date    EGFR 61 01/07/2022    EGFR 48 12/02/2021    EGFR 51 09/22/2021    CREATININE 1 10 01/07/2022    CREATININE 1 35 (H) 12/02/2021    CREATININE 1 28 09/22/2021     · Monitor for now

## 2022-01-08 NOTE — CONSULTS
Consultation - Pulmonary Medicine   Miguel Angel Fortune 42 y o  female MRN: [id number]  Unit/Bed#: -01 Encounter: [id number]      Assessment/Plan:    1  Sepsis w/ bacteremia secondary to unclear etiology       -  T-max 106° F, WBC 2 94       -  1/7/2022- 2/2 BC- gram-positive cocci, repeat cultures pending       -  no noted overt pneumonia per imaging       -  source unclear, possibly from port (2019) may need to consider abdominal source       -  ID following       -  Day # 1- Daptomycin       -  continue to monitor WBC and fever curve    2  Right pneumonectomy w/ left lung transplant secondary to cystic fibrosis       -  follows with the [address]       -  3/1/2017- redo lung transplant (LSLTxp)       -  10/14/2021- right pneumonectomy w/myocutaneous/fasciocutaneous flap       -  Continue:  Hydrocortisone q 8 (home regimen prednisone 10 mg), Mepron, Prograf, Atovaquone    3  H/O cystic fibrosis       - home regimen:  3% sodium chloride b i d , albuterol nebulizer q 6h p r n        -  Inpatient:  Albuterol as needed         4  Mild intermittent asthma without acute exacerbation       -  asthma is in overall very well controlled       -  no indication for steroids at this time       -  nebulizers as needed as above    5  DVT       -  continue Eliquis        - will see again Monday        History of Present Illness   Physician Requesting Consult: Yeni Jeffrey, *  Reason for Consult / Principal Problem:  Recurrent pneumonia  Hx and PE limited by:  Nothing  Chief Complaint: "I have right lower extremity numbness"  HPI: Kendra Carlos is a 42 y o   female who presented to [address] [place] [address] with complaints of fevers  Patient has past medical history of asthma, cystic fibrosis, left lung transplant, right pneumonectomy, recurrent pneumonia, and GERD    Patient reports that the previous evening she was at home and had fevers in upwards of 106° F  Patient reports that she has had some generalized weakness and mild cough however overall she feels that she is at her respiratory baseline  Upon ED admission patient was noted to be bacteremic with gram-positive cocci in clusters  CT imaging overall does not show any overt infectious process or pneumonia  Internal medicine discussed patient with transplant team at [facility] and it was recommended she be initiated on IV Zosyn  Pulmonary was consulted for recurrent pneumonia and cystic fibrosis  Today upon examination patient appeared to be at her respiratory baseline  Patient is currently denying any overt respiratory symptoms such as cough, shortness of breath, or wheezing  Upon examination patient's left lung was clear to auscultation  Patient does report her fevers have subsided since hospitalization  Patient currently denying any hemoptysis, night sweats, pleuritic chest pain, nausea, or vomiting  From a pulmonary standpoint, patient follows with [facility] for her right pneumonectomy and left lung transplant secondary to cystic fibrosis  Patient does have a very remote quarter pack a day 4 year smoking history  Patient does report that she was diagnosed with asthma as a child however her symptoms have been overall very well managed with only using albuterol MDI 2 puffs q 6h p r n  Cris Prows Patient is not maintained on any oxygen therapies  Patient reports she uses her albuterol approximately 1 to 2 times every 4 months  Patient does report having 1 dog  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  Patient does report significant history of GERD in which she is maintained on Protonix and Pepcid  Patient denies any symptoms of postnasal drip, seasonal allergies, MICHAEL, or dysphagia  Patient denies any acute exposures to dust, mold, asbestos, or silica  Consults    Review of Systems   Constitutional: Positive for activity change  Negative for appetite change     HENT: Negative for congestion and dental problem  Respiratory: Positive for cough and shortness of breath  Negative for apnea, choking, chest tightness, wheezing and stridor  Cardiovascular: Negative for chest pain, palpitations and leg swelling  Gastrointestinal: Negative for abdominal distention and abdominal pain  Genitourinary: Negative for difficulty urinating and dyspareunia  Musculoskeletal: Negative for arthralgias and back pain  Skin: Negative for color change and pallor  Neurological: Positive for numbness  Psychiatric/Behavioral: Negative for agitation and behavioral problems  Historical Information   Past Medical History:   Diagnosis Date    ABPA (allergic bronchopulmonary aspergillosis) ([facility] ) 7/8/2012    Acute kidney injury ([facility] ) 9/8/2017    Acute on chronic respiratory failure ([place] [address] ) 3/4/2017    Last Assessment & Plan:  Formatting of this note might be different from the original  Recurrent with clinical worsening with enlarging PTX, s/p multiple chest tubes, but new fevers 6/19 and worsening leukocytosis indicating infection as cause of 6/19 decompensation with profound hypoxia in setting of shunting through right lung and continued pneumothorax s/p 2nd chest tube placement  6/27: Right    Asthma     Chronic pain     [facility]yst[facility] fibrosis [facility] )     Diabetes mellitus ([facility] )     GERD (gastroesophageal reflux disease)     HBP (high blood pressure)     HCAP (healthcare-associated pneumonia) 3/6/2020    Influenza B 2/23/2020    Kidney stone     Neutropenic fever ([facility] ) 9/26/2020    PONV (postoperative nausea and vomiting)     Pseudomonas aeruginosa infection 9/20/2012    Transplant recipient     Lung- 2013, 2017     Past Surgical History:   Procedure Laterality Date    APPENDECTOMY      BREAST SURGERY      Aug      BRONCHOSCOPY      CHOLECYSTECTOMY      COLONOSCOPY      EGD  07/2019    Yeast Infect       FL RETROGRADE PYELOGRAM  1/9/2020    FL RETROGRADE PYELOGRAM  2/4/2020    GASTROSTOMY TUBE, PLACE      LUNG SURGERY      LUNG TRANSPLANT      X2    ID CYSTO/URETERO W/LITHOTRIPSY &INDWELL STENT INSRT Right 2/4/2020    Procedure: CYSTOSCOPY URETEROSCOPY WITH LITHOTRIPSY HOLMIUM LASER, RETROGRADE PYELOGRAM AND INSERTION STENT URETERAL;  Surgeon: Krish Castillo MD;  Location: [facility];  Service: Urology    ID CYSTOURETHROSCOPY,URETER CATHETER Right 1/9/2020    Procedure: CYSTOSCOPY RETROGRADE PYELOGRAM WITH INSERTION STENT URETERAL;  Surgeon: Krish Castillo MD;  Location: [facility];  Service: Urology    US GUIDED VASCULAR ACCESS  1/17/2017     Social History   Social History     Substance and Sexual Activity   Alcohol Use Yes    Comment: social     Social History     Substance and Sexual Activity   Drug Use Yes    Frequency: 2 0 times per week    Types: Marijuana    Comment: medical edible marijuana prescribed     Social History     Tobacco Use   Smoking Status Former Smoker    Packs/day: 0 50    Years: 12 00    Pack years: 6 00   Smokeless Tobacco Never Used     E-Cigarette/Vaping    E-Cigarette Use Never User      E-Cigarette/Vaping Substances     Occupational History:  Noncontributory    Family History:   Family History   Problem Relation Age of Onset    Alcohol abuse Mother     Mental illness Mother     Alcohol abuse Father     COPD Maternal Grandmother     COPD Maternal Grandfather     COPD Paternal Grandmother     Breast cancer Neg Hx     Ovarian cancer Neg Hx     Colon cancer Neg Hx        Meds/Allergies   pertinent pulmonary meds have been reviewed    Allergies   Allergen Reactions    Vancomycin Angioedema and Hives    Ceftazidime Headache     Severe headaches after desensitization       Objective   Vitals: Blood pressure 111/63, pulse 71, temperature (!) 97 4 °F (36 3 °C), resp  rate 17, height 5' 2" (1 575 m), weight 43 9 kg (96 lb 12 5 oz), SpO2 95 %, not currently breastfeeding  RA,Body mass index is 17 7 kg/m²      Intake/Output Summary (Last 24 hours) at 1/8/2022 97 Raina Sandhu filed at 1/8/2022 8987  Gross per 24 hour   Intake 5237 08 ml   Output 400 ml   Net 4837 08 ml     Invasive Devices  Report    Central Venous Catheter Line            Port A Cath Right Chest -- days          Peripheral Intravenous Line            Peripheral IV 01/07/22 Left Forearm <1 day          Drain            Ureteral Drain/Stent Right ureter 6 Fr  703 days                Physical Exam  Constitutional:       General: She is not in acute distress  Appearance: Normal appearance  She is normal weight  She is not ill-appearing  HENT:      Head: Normocephalic and atraumatic  Nose: Nose normal  No congestion or rhinorrhea  Mouth/Throat:      Mouth: Mucous membranes are dry  Pharynx: No oropharyngeal exudate or posterior oropharyngeal erythema  Cardiovascular:      Rate and Rhythm: Normal rate and regular rhythm  Pulses: Normal pulses  Heart sounds: Normal heart sounds  No murmur heard  No friction rub  No gallop  Pulmonary:      Effort: Pulmonary effort is normal  No tachypnea, bradypnea, accessory muscle usage or respiratory distress  Breath sounds: Normal breath sounds  No stridor, decreased air movement or transmitted upper airway sounds  No decreased breath sounds, wheezing, rhonchi or rales  Comments: Clear left-sided breath sounds  Chest:      Chest wall: No tenderness  Abdominal:      General: Abdomen is flat  Bowel sounds are normal  There is no distension  Palpations: Abdomen is soft  There is no mass  Musculoskeletal:         General: No swelling or tenderness  Normal range of motion  Cervical back: Normal range of motion and neck supple  No rigidity or tenderness  Comments: Right lower extremity numbness   Skin:     General: Skin is warm and dry  Coloration: Skin is not jaundiced or pale  Neurological:      General: No focal deficit present  Mental Status: She is alert and oriented to person, place, and time  Mental status is at baseline  Psychiatric:         Mood and Affect: Mood normal          Behavior: Behavior normal          Lab Results: I have personally reviewed pertinent lab results 1/7/2022    Imaging Studies: I have personally reviewed pertinent films in PACS     Chest CT- 1/7/2022- right pneumonectomy no acute pulmonary findings left lung    EKG, Pathology, and Other Studies: I have personally reviewed pertinent films in PACS   EKG- 1/7/2022- normal sinus rhythm    Pulmonary Results (PFTs, PSG): I have personally reviewed pertinent films in PACS     PFTs- 9/3/2021    FVC PRE ACTUAL 2 66 - 4 08 L 2 56 Low     FVC PRE %PRED % 76 2    FEV1 PRE ACTUAL 2 16 - 3 29 L 1 32 Low     FEV1 PRE %PRED % 48 3    FEV1/FVC PRE ACTUAL 70 82 - 90 96 % 51 69 Low     FEV1/SVC  PRE ACTUAL 70 82 - 90 96 % 51 69 Low     DJD36-73 PRE ACTUAL 1 71 - 4 34 L/s 0 5 Low     WJP98-63 PRE %PRED % 17 4          VTE Prophylaxis: Sequential compression device (Venodyne)     Code Status: Level 1 - Full Code    None    Portions of the record may have been created with voice recognition software  Occasional wrong word or "sound a like" substitutions may have occurred due to the inherent limitations of voice recognition software  Read the chart carefully and recognize, using context, where substitutions have occurred

## 2022-01-08 NOTE — ASSESSMENT & PLAN NOTE
- 2/2 sets positive for Gram-positive cocci on admission  - source is not entirely clear at this time imaging of the chest and abdomen/pelvis was unrevealing  She has no open skin lesions  No recent trauma    - possible source could be patient's port though superficially looks fine    - start on IV daptomycin; patient has a severe anaphylactic reaction to vancomycin  - monitor WBC count and fever curve  - repeat blood cultures tomorrow  - check echocardiogram  - follow-up ID consultation  - consider port removal pending further results or persistent bacteremia

## 2022-01-09 ENCOUNTER — APPOINTMENT (INPATIENT)
Dept: NON INVASIVE DIAGNOSTICS | Facility: HOSPITAL | Age: 47
DRG: 314 | End: 2022-01-09
Payer: COMMERCIAL

## 2022-01-09 LAB
ABO GROUP BLD: NORMAL
ANION GAP SERPL CALCULATED.3IONS-SCNC: 6 MMOL/L (ref 4–13)
ATRIAL RATE: 99 BPM
BLD GP AB SCN SERPL QL: NEGATIVE
BUN SERPL-MCNC: 22 MG/DL (ref 5–25)
CALCIUM SERPL-MCNC: 7.8 MG/DL (ref 8.3–10.1)
CHLORIDE SERPL-SCNC: 103 MMOL/L (ref 100–108)
CO2 SERPL-SCNC: 27 MMOL/L (ref 21–32)
CREAT SERPL-MCNC: 1.05 MG/DL (ref 0.6–1.3)
ERYTHROCYTE [DISTWIDTH] IN BLOOD BY AUTOMATED COUNT: 16.5 % (ref 11.6–15.1)
FERRITIN SERPL-MCNC: 12 NG/ML (ref 8–388)
GFR SERPL CREATININE-BSD FRML MDRD: 64 ML/MIN/1.73SQ M
GLUCOSE SERPL-MCNC: 249 MG/DL (ref 65–140)
GLUCOSE SERPL-MCNC: 251 MG/DL (ref 65–140)
GLUCOSE SERPL-MCNC: 289 MG/DL (ref 65–140)
GLUCOSE SERPL-MCNC: 308 MG/DL (ref 65–140)
GLUCOSE SERPL-MCNC: 335 MG/DL (ref 65–140)
HCT VFR BLD AUTO: 24.2 % (ref 34.8–46.1)
HCT VFR BLD AUTO: 25.1 % (ref 34.8–46.1)
HCT VFR BLD AUTO: 29.9 % (ref 34.8–46.1)
HGB BLD-MCNC: 6.9 G/DL (ref 11.5–15.4)
HGB BLD-MCNC: 6.9 G/DL (ref 11.5–15.4)
HGB BLD-MCNC: 8.9 G/DL (ref 11.5–15.4)
IRON SATN MFR SERPL: 5 % (ref 15–50)
IRON SERPL-MCNC: 14 UG/DL (ref 50–170)
MCH RBC QN AUTO: 23 PG (ref 26.8–34.3)
MCHC RBC AUTO-ENTMCNC: 28.5 G/DL (ref 31.4–37.4)
MCV RBC AUTO: 81 FL (ref 82–98)
P AXIS: 53 DEGREES
PLATELET # BLD AUTO: 149 THOUSANDS/UL (ref 149–390)
PMV BLD AUTO: 10.9 FL (ref 8.9–12.7)
POTASSIUM SERPL-SCNC: 4.5 MMOL/L (ref 3.5–5.3)
PR INTERVAL: 134 MS
PROCALCITONIN SERPL-MCNC: 0.24 NG/ML
QRS AXIS: 62 DEGREES
QRSD INTERVAL: 66 MS
QT INTERVAL: 332 MS
QTC INTERVAL: 426 MS
RBC # BLD AUTO: 3 MILLION/UL (ref 3.81–5.12)
RETICS # AUTO: ABNORMAL 10*3/UL (ref 14097–95744)
RETICS # CALC: 3.08 % (ref 0.37–1.87)
RH BLD: NEGATIVE
SODIUM SERPL-SCNC: 136 MMOL/L (ref 136–145)
SPECIMEN EXPIRATION DATE: NORMAL
T WAVE AXIS: 64 DEGREES
TIBC SERPL-MCNC: 296 UG/DL (ref 250–450)
TRICUSPID VALVE PEAK REGURGITATION VELOCITY: 3.26 M/S
TRICUSPID VALVE S': 3.3 CM/S
TV PEAK GRADIENT: 43 MMHG
VENTRICULAR RATE: 99 BPM
WBC # BLD AUTO: 2.51 THOUSAND/UL (ref 4.31–10.16)

## 2022-01-09 PROCEDURE — 93321 DOPPLER ECHO F-UP/LMTD STD: CPT | Performed by: INTERNAL MEDICINE

## 2022-01-09 PROCEDURE — 85027 COMPLETE CBC AUTOMATED: CPT | Performed by: INTERNAL MEDICINE

## 2022-01-09 PROCEDURE — 94760 N-INVAS EAR/PLS OXIMETRY 1: CPT

## 2022-01-09 PROCEDURE — 86923 COMPATIBILITY TEST ELECTRIC: CPT

## 2022-01-09 PROCEDURE — P9058 RBC, L/R, CMV-NEG, IRRAD: HCPCS

## 2022-01-09 PROCEDURE — 82728 ASSAY OF FERRITIN: CPT | Performed by: INTERNAL MEDICINE

## 2022-01-09 PROCEDURE — 82948 REAGENT STRIP/BLOOD GLUCOSE: CPT

## 2022-01-09 PROCEDURE — 85045 AUTOMATED RETICULOCYTE COUNT: CPT | Performed by: INTERNAL MEDICINE

## 2022-01-09 PROCEDURE — 99233 SBSQ HOSP IP/OBS HIGH 50: CPT | Performed by: INTERNAL MEDICINE

## 2022-01-09 PROCEDURE — 93308 TTE F-UP OR LMTD: CPT

## 2022-01-09 PROCEDURE — 83550 IRON BINDING TEST: CPT | Performed by: INTERNAL MEDICINE

## 2022-01-09 PROCEDURE — 85014 HEMATOCRIT: CPT | Performed by: NURSE PRACTITIONER

## 2022-01-09 PROCEDURE — 93325 DOPPLER ECHO COLOR FLOW MAPG: CPT | Performed by: INTERNAL MEDICINE

## 2022-01-09 PROCEDURE — 85018 HEMOGLOBIN: CPT | Performed by: NURSE PRACTITIONER

## 2022-01-09 PROCEDURE — 94640 AIRWAY INHALATION TREATMENT: CPT

## 2022-01-09 PROCEDURE — 86900 BLOOD TYPING SEROLOGIC ABO: CPT | Performed by: NURSE PRACTITIONER

## 2022-01-09 PROCEDURE — 93010 ELECTROCARDIOGRAM REPORT: CPT | Performed by: INTERNAL MEDICINE

## 2022-01-09 PROCEDURE — 86850 RBC ANTIBODY SCREEN: CPT | Performed by: NURSE PRACTITIONER

## 2022-01-09 PROCEDURE — NC001 PR NO CHARGE: Performed by: RADIOLOGY

## 2022-01-09 PROCEDURE — 86901 BLOOD TYPING SEROLOGIC RH(D): CPT | Performed by: NURSE PRACTITIONER

## 2022-01-09 PROCEDURE — 87040 BLOOD CULTURE FOR BACTERIA: CPT | Performed by: INTERNAL MEDICINE

## 2022-01-09 PROCEDURE — 85014 HEMATOCRIT: CPT | Performed by: INTERNAL MEDICINE

## 2022-01-09 PROCEDURE — 83540 ASSAY OF IRON: CPT | Performed by: INTERNAL MEDICINE

## 2022-01-09 PROCEDURE — 80048 BASIC METABOLIC PNL TOTAL CA: CPT | Performed by: INTERNAL MEDICINE

## 2022-01-09 PROCEDURE — 93308 TTE F-UP OR LMTD: CPT | Performed by: INTERNAL MEDICINE

## 2022-01-09 PROCEDURE — 30233N1 TRANSFUSION OF NONAUTOLOGOUS RED BLOOD CELLS INTO PERIPHERAL VEIN, PERCUTANEOUS APPROACH: ICD-10-PCS | Performed by: INTERNAL MEDICINE

## 2022-01-09 PROCEDURE — 85018 HEMOGLOBIN: CPT | Performed by: INTERNAL MEDICINE

## 2022-01-09 RX ORDER — DIPHENHYDRAMINE HYDROCHLORIDE 50 MG/ML
25 INJECTION INTRAMUSCULAR; INTRAVENOUS ONCE
Status: COMPLETED | OUTPATIENT
Start: 2022-01-09 | End: 2022-01-09

## 2022-01-09 RX ORDER — OXYCODONE HYDROCHLORIDE 10 MG/1
10 TABLET ORAL EVERY 4 HOURS PRN
Status: DISCONTINUED | OUTPATIENT
Start: 2022-01-09 | End: 2022-01-14 | Stop reason: HOSPADM

## 2022-01-09 RX ORDER — OXYCODONE HYDROCHLORIDE 5 MG/1
5 TABLET ORAL EVERY 6 HOURS PRN
Status: DISCONTINUED | OUTPATIENT
Start: 2022-01-09 | End: 2022-01-14 | Stop reason: HOSPADM

## 2022-01-09 RX ADMIN — APIXABAN 5 MG: 5 TABLET, FILM COATED ORAL at 08:15

## 2022-01-09 RX ADMIN — INSULIN LISPRO 3 UNITS: 100 INJECTION, SOLUTION INTRAVENOUS; SUBCUTANEOUS at 22:04

## 2022-01-09 RX ADMIN — OXYCODONE HYDROCHLORIDE 10 MG: 10 TABLET ORAL at 10:09

## 2022-01-09 RX ADMIN — HYDROCORTISONE SODIUM SUCCINATE 50 MG: 100 INJECTION, POWDER, FOR SOLUTION INTRAMUSCULAR; INTRAVENOUS at 05:44

## 2022-01-09 RX ADMIN — PREGABALIN 150 MG: 75 CAPSULE ORAL at 08:15

## 2022-01-09 RX ADMIN — HYDROCORTISONE SODIUM SUCCINATE 50 MG: 100 INJECTION, POWDER, FOR SOLUTION INTRAMUSCULAR; INTRAVENOUS at 13:27

## 2022-01-09 RX ADMIN — SODIUM CHLORIDE SOLN NEBU 3% 4 ML: 3 NEBU SOLN at 21:26

## 2022-01-09 RX ADMIN — PANCRELIPASE 48000 UNITS: 24000; 76000; 120000 CAPSULE, DELAYED RELEASE PELLETS ORAL at 16:34

## 2022-01-09 RX ADMIN — PANCRELIPASE 48000 UNITS: 24000; 76000; 120000 CAPSULE, DELAYED RELEASE PELLETS ORAL at 11:30

## 2022-01-09 RX ADMIN — LIDOCAINE 5% 2 PATCH: 700 PATCH TOPICAL at 08:16

## 2022-01-09 RX ADMIN — OXYCODONE HYDROCHLORIDE 10 MG: 10 TABLET ORAL at 17:23

## 2022-01-09 RX ADMIN — HYDROCORTISONE SODIUM SUCCINATE 50 MG: 100 INJECTION, POWDER, FOR SOLUTION INTRAMUSCULAR; INTRAVENOUS at 22:03

## 2022-01-09 RX ADMIN — INSULIN LISPRO 2 UNITS: 100 INJECTION, SOLUTION INTRAVENOUS; SUBCUTANEOUS at 16:34

## 2022-01-09 RX ADMIN — CEFAZOLIN SODIUM 2000 MG: 2 SOLUTION INTRAVENOUS at 20:02

## 2022-01-09 RX ADMIN — PANTOPRAZOLE SODIUM 40 MG: 40 TABLET, DELAYED RELEASE ORAL at 05:44

## 2022-01-09 RX ADMIN — OXYCODONE HYDROCHLORIDE 5 MG: 5 TABLET ORAL at 03:56

## 2022-01-09 RX ADMIN — APIXABAN 5 MG: 5 TABLET, FILM COATED ORAL at 17:19

## 2022-01-09 RX ADMIN — PREGABALIN 150 MG: 75 CAPSULE ORAL at 22:04

## 2022-01-09 RX ADMIN — ACETAMINOPHEN 650 MG: 325 TABLET, FILM COATED ORAL at 23:21

## 2022-01-09 RX ADMIN — TACROLIMUS 3 MG: 0.5 CAPSULE ORAL at 08:15

## 2022-01-09 RX ADMIN — ACETAMINOPHEN 650 MG: 325 TABLET, FILM COATED ORAL at 05:44

## 2022-01-09 RX ADMIN — INSULIN LISPRO 3 UNITS: 100 INJECTION, SOLUTION INTRAVENOUS; SUBCUTANEOUS at 11:30

## 2022-01-09 RX ADMIN — PREGABALIN 150 MG: 75 CAPSULE ORAL at 17:19

## 2022-01-09 RX ADMIN — CEFAZOLIN SODIUM 2000 MG: 2 SOLUTION INTRAVENOUS at 08:13

## 2022-01-09 RX ADMIN — PANCRELIPASE 48000 UNITS: 24000; 76000; 120000 CAPSULE, DELAYED RELEASE PELLETS ORAL at 08:14

## 2022-01-09 RX ADMIN — ATOVAQUONE 1500 MG: 750 SUSPENSION ORAL at 08:15

## 2022-01-09 RX ADMIN — DIPHENHYDRAMINE HYDROCHLORIDE 25 MG: 50 INJECTION, SOLUTION INTRAMUSCULAR; INTRAVENOUS at 15:34

## 2022-01-09 RX ADMIN — DIPHENHYDRAMINE HYDROCHLORIDE 25 MG: 50 INJECTION, SOLUTION INTRAMUSCULAR; INTRAVENOUS at 16:40

## 2022-01-09 RX ADMIN — DAPTOMYCIN 350 MG: 500 INJECTION, POWDER, LYOPHILIZED, FOR SOLUTION INTRAVENOUS at 11:30

## 2022-01-09 RX ADMIN — VALACYCLOVIR HYDROCHLORIDE 500 MG: 500 TABLET, FILM COATED ORAL at 13:27

## 2022-01-09 RX ADMIN — TACROLIMUS 3 MG: 0.5 CAPSULE ORAL at 17:20

## 2022-01-09 NOTE — ASSESSMENT & PLAN NOTE
- history of lung transplant secondary to cystic fibrosis  - follows with [facility]  - history of pneumonia with Pseudomonas    - continue home tacrolimus, stress dose steroids, for now  - continue prophylactic atovaquone and valacyclovir

## 2022-01-09 NOTE — PROGRESS NOTES
Progress Note - Infectious Disease   Cindia Denver Zelonisgecek 42 y o  female MRN: [id number]  Unit/Bed#: -01 Encounter: [id number]      Impression/Plan:  1  Sepsis, POA  Patient presented on admission with high fever along with leukopenia  Likely source problem 2  No other obvious sources on exam   Fever curve improved  White blood cell count stable today  Suspect low counts in the setting of acute illness  Most recent CMV PCR in November negative and no other localizing symptoms  Antibiotics as below  Continue to trend fever curve/vitals  Repeat labs tomorrow  Follow-up pending blood cultures  Follow-up pending echo  Plan for port removal as below  Additional care as per primary  Additional interventions pending clinical course     2  Gram-positive bacteremia  Two of 2 blood cultures from admission have returned positive with Gram-positive cocci in clusters  Patient's only intravascular device is her port  CT chest, abdomen and pelvis largely unremarkable  Patient reporting some midthoracic right-sided back discomfort  MRI T-spine negative  Otherwise no other localizing findings on exam   Suspected port infection  Patient with severe vancomycin allergy  Fever curve appeared to improve with Zosyn alone  Possibly MSSA infection  Continue daptomycin  Continue Ancef  Continue to trend fever curve/vitals  Repeat labs tomorrow  Follow-up pending blood cultures  2D echo pending, may ultimately need DEJUAN  IR consulted for port removal and temporary access placement  Would culture catheter tip once removed  Patient will eventually require port replacement  Additional care as per primary  Additional interventions pending clinical course     3  CF status post bilateral lung transplant 2013 and left lung re-transplant 2017  Patient has had multiple infectious complications and most recently required right-sided pneumonectomy and was treated for left-sided Mycobacterium abscesses infection    CMV mismatch and recent PCRs negative  Still on regular monitoring  Reportedly no change to immunosuppression otherwise  Continue chronic immunosuppression with tacrolimus and prednisone  Continue prophylaxis with atovaquone and Valtrex  Ongoing follow-up by Pulmonary  Continue antibiotics as above  Continue to trend fever curve/WBC  CMV monitoring as outpatient  Recommend regular updates to transplant center  Additional care as per primary     4  Malnutrition and diabetes  Supportive care as per primary  5  Transaminitis  Currently down trending  Likely due to acute illness  Reviewed CMP ordered for tomorrow  6  Anemia  Possibly related to acute illness  Continue to monitor and transfusional support as per primary  Above plan discussed in detail with the patient at bedside  ID consult service will continue to follow  Antibiotics:  Daptomycin/Ancef 2  Total Antibiotics 3    Subjective:  Patient has no fever, chills, sweats; no nausea, vomiting, diarrhea; no cough, shortness of breath; no pain  No new symptoms  Some degree of her chronic muscle discomfort in her back but nothing out of the ordinary  Denies having any pain at her port site  Objective:  Vitals:  Temp:  [97 8 °F (36 6 °C)-98 5 °F (36 9 °C)] 97 8 °F (36 6 °C)  HR:  [67-78] 67  Resp:  [18-19] 18  BP: (101-136)/(59-80) 136/79  SpO2:  [97 %-98 %] 97 %  Temp (24hrs), Av 1 °F (36 7 °C), Min:97 8 °F (36 6 °C), Max:98 5 °F (36 9 °C)  Current: Temperature: 97 8 °F (36 6 °C)    Physical Exam:   General Appearance:  Alert, interactive, nontoxic, no acute distress  Chronically ill-appearing and frail  Throat: Oropharynx moist without lesions  Lungs:   Clear to auscultation; no wheezes, rhonchi or rales on the left  Respirations nonlabored on room air   Heart:  RRR; no murmur, rub or gallop appreciated   Abdomen:   Soft, non-tender, non-distended, positive bowel sounds       Extremities: No clubbing, cyanosis or edema   Skin: No new rashes or lesions  No new draining wounds noted  Labs, Imaging, & Other studies:   All pertinent labs and imaging studies were personally reviewed  Results from last 7 days   Lab Units 01/09/22  0758 01/09/22 0538 01/08/22  1113 01/07/22 0921 01/07/22 0921   WBC Thousand/uL  --  2 51* 2 23*  --  2 94*   HEMOGLOBIN g/dL 6 9* 6 9* 7 7*   < > 8 1*   PLATELETS Thousands/uL  --  149 143*  --  169    < > = values in this interval not displayed  Results from last 7 days   Lab Units 01/09/22  0538 01/08/22  1113 01/08/22  1113 01/07/22  0921 01/07/22  0921   POTASSIUM mmol/L 4 5   < > 4 8   < > 3 8   CHLORIDE mmol/L 103   < > 99*   < > 103   CO2 mmol/L 27   < > 25   < > 29   BUN mg/dL 22   < > 20   < > 22   CREATININE mg/dL 1 05   < > 1 18   < > 1 10   EGFR ml/min/1 73sq m 64   < > 56   < > 61   CALCIUM mg/dL 7 8*   < > 7 5*   < > 7 9*   AST U/L  --   --  42  --  59*   ALT U/L  --   --  105*   < > 160*   ALK PHOS U/L  --   --  228*   < > 283*    < > = values in this interval not displayed       Results from last 7 days   Lab Units 01/07/22 0921   GRAM STAIN RESULT  Gram positive cocci in clusters*  Gram positive cocci in clusters*

## 2022-01-09 NOTE — CONSULTS
e-Consult ([facility])  - Interventional Radiology  Grant Turciosfern 42 y o  female MRN: [id number]  Unit/Bed#: -01 Encounter: [id number]    IR has been consulted to evaluate the patient, determine the appropriate procedure, and whether or not a procedure can and should be performed regarding the care of Sydnie Roberts  Consults  01/09/22      Assessment/Recommendation:   51-year-old female with history of cystic fibrosis status post bilateral lung transplant in 2013 followed by left lung retransplant in 2017, as well as recent right sided pneumonectomy, receiving IV medications through right-sided chest port due to difficult peripheral IV access  Patient found to have Gram positive bacteremia and is referred for port removal     - plan for right chest port removal tomorrow  - if peripheral IV access cannot be obtained by tomorrow, patient may need PICC placement  - please keep patient NPO after midnight  Total time spent in review of data, discussion with requesting provider and rendering advice was 15 min  Thank you for allowing Interventional Radiology to participate in the care of Sydnie Roberts  Please don't hesitate to call or TigerText us with any questions       Moody Dandy, MD

## 2022-01-09 NOTE — ASSESSMENT & PLAN NOTE
- no active signs or symptoms of bleeding  - patient denies any hematochezia, melena, or hematemesis  Denies any hematuria  Has not had menses for few months    - Hb stable at 9 5  - monitor clinically for signs or symptoms of bleeding  - transfuses 1 unit irradiated PRBCs 1/9

## 2022-01-09 NOTE — ASSESSMENT & PLAN NOTE
- repeat Blood cultures: no growth 24 hours, will wait for 72 hrs till a new line is obtained  Pt says she cannot get a PICC line   IR will be consulted for other options  - met criteria on admission with tachycardia, tachypnea, fever, hypotension, and now with Gram-positive bacteremia  - treatment plan as above

## 2022-01-09 NOTE — PLAN OF CARE
Problem: PAIN - ADULT  Goal: Verbalizes/displays adequate comfort level or baseline comfort level  Description: Interventions:  - Encourage patient to monitor pain and request assistance  - Assess pain using appropriate pain scale  - Administer analgesics based on type and severity of pain and evaluate response  - Implement non-pharmacological measures as appropriate and evaluate response  - Consider cultural and social influences on pain and pain management  - Notify physician/advanced practitioner if interventions unsuccessful or patient reports new pain  Outcome: Progressing     Problem: RESPIRATORY - ADULT  Goal: Achieves optimal ventilation and oxygenation  Description: INTERVENTIONS:  - Assess for changes in respiratory status  - Assess for changes in mentation and behavior  - Position to facilitate oxygenation and minimize respiratory effort  - Oxygen administered by appropriate delivery if ordered  - Initiate smoking cessation education as indicated  - Encourage broncho-pulmonary hygiene including cough, deep breathe, Incentive Spirometry  - Assess the need for suctioning and aspirate as needed  - Assess and instruct to report SOB or any respiratory difficulty  - Respiratory Therapy support as indicated  Outcome: Progressing     Problem: Nutrition/Hydration-ADULT  Goal: Nutrient/Hydration intake appropriate for improving, restoring or maintaining nutritional needs  Description: Monitor and assess patient's nutrition/hydration status for malnutrition  Collaborate with interdisciplinary team and initiate plan and interventions as ordered  Monitor patient's weight and dietary intake as ordered or per policy  Utilize nutrition screening tool and intervene as necessary  Determine patient's food preferences and provide high-protein, high-caloric foods as appropriate       INTERVENTIONS:  - Monitor oral intake, urinary output, labs, and treatment plans  - Assess nutrition and hydration status and recommend course of action  - Evaluate amount of meals eaten  - Assist patient with eating if necessary   - Allow adequate time for meals  - Recommend/ encourage appropriate diets, oral nutritional supplements, and vitamin/mineral supplements  - Order, calculate, and assess calorie counts as needed  - Recommend, monitor, and adjust tube feedings and TPN/PPN based on assessed needs  - Assess need for intravenous fluids  - Provide specific nutrition/hydration education as appropriate  - Include patient/family/caregiver in decisions related to nutrition  Outcome: Progressing

## 2022-01-09 NOTE — PLAN OF CARE
Problem: INFECTION - ADULT  Goal: Absence or prevention of progression during hospitalization  Description: INTERVENTIONS:  - Assess and monitor for signs and symptoms of infection  - Monitor lab/diagnostic results  - Monitor all insertion sites, i e  indwelling lines, tubes, and drains  - Monitor endotracheal if appropriate and nasal secretions for changes in amount and color  - [place] appropriate cooling/warming therapies per order  - Administer medications as ordered  - Instruct and encourage patient and family to use good hand hygiene technique  - Identify and instruct in appropriate isolation precautions for identified infection/condition  Outcome: Progressing

## 2022-01-09 NOTE — PROGRESS NOTES
[facility]  Progress Note Rosaura Mojica 1975, 42 y o  female MRN: [id number]  Unit/Bed#: -01 Encounter: [id number]  Primary Care Provider: Howard Chatman MD   Date and time admitted to hospital: 1/7/2022  8:07 AM    * Gram-positive cocci bacteremia  Assessment & Plan  - 2/2 sets positive for Gram-positive cocci on admission  - source is not entirely clear at this time imaging of the chest and abdomen/pelvis was unrevealing  She has no open skin lesions  No recent trauma  MRI of the thoracic spine where she has had some pain was unrevealing    - IR consulted to remove port on 01/10   - continue IV daptomycin and cefazolin for now per ID recommendations  - follow-up repeat blood cultures drawn 1/9  - monitor WBC count and fever curve  - follow-up echocardiogram      Sepsis [facility])  Assessment & Plan  - met criteria on admission with tachycardia, tachypnea, fever, hypotension, and now with Gram-positive bacteremia  - treatment plan as above    Acute on chronic anemia  Assessment & Plan  - no active signs or symptoms of bleeding  - patient denies any hematochezia, melena, or hematemesis  Denies any hematuria  Has not had menses for few months      - will check anemia panel  - monitor clinically for signs or symptoms of bleeding  - transfuse 1 unit irradiated PRBCs today    Lung transplant status, bilateral ([facility] [address] )  Assessment & Plan  - history of lung transplant secondary to cystic fibrosis  - follows with [facility]  - history of pneumonia with Pseudomonas    - continue empiric IV Zosyn for now; however patient has no respiratory symptoms and there is no consolidation on chest x-ray  - continue home tacrolimus, stress dose steroids, for now  - continue prophylactic atovaquone and valacyclovir    Cystic fibrosis ([facility] [address] )  Assessment & Plan  - History noted    Severe protein-calorie malnutrition (HCC)  Assessment & Plan  - nutrition evaluation    Chronic kidney disease  Assessment & Plan  - renal function at baseline; continue to monitor    Long term current use of systemic steroids  Assessment & Plan  - on long-term systemic steroids due to history of lung transplant  - currently on IV hydrocortisone given current sepsis/infection    DVT (deep venous thrombosis) ([facility])  Assessment & Plan  - Continue Eliquis    Chronic pain disorder  Assessment & Plan  - Continue home meds    Asthma  Assessment & Plan  - Continue duo nebs as needed    Pancreatic insufficiency  Assessment & Plan  - history of cystic fibrosis  - Continue pancrelipase    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, with long-term current use of insulin ([facility] [address] )  Assessment & Plan  - last hemoglobin A1c on record in 2021 showed hemoglobin A1c of 6 1%  - monitor on ISS for now      VTE Pharmacologic Prophylaxis:   High Risk (Score >/= 5) - Pharmacological DVT Prophylaxis Ordered: apixaban (Eliquis)  Sequential Compression Devices Ordered  Patient Centered Rounds: I performed bedside rounds with nursing staff today  Discussions with Specialists or Other Care Team Provider:  Case management  Interventional Radiology    Education and Discussions with Family / Patient: Patient declined call to   Time Spent for Care: 30 minutes  More than 50% of total time spent on counseling and coordination of care as described above  Current Length of Stay: 2 day(s)  Current Patient Status: Inpatient   Certification Statement: The patient will continue to require additional inpatient hospital stay due to Gram-positive bacteremia, sepsis  Discharge Plan: Anticipate discharge in >72 hrs to home  Code Status: Level 1 - Full Code    Subjective:   Patient seen examined  Following up for Gram-positive bacteremia  Patient afebrile overnight  States she is feeling better than on admission  Still has some mid back pain but otherwise doing well      Objective:     Vitals:   Temp (24hrs), Av 1 °F (36 7 °C), Min:97 8 °F (36 6 °C), Max:98 5 °F (36 9 °C)    Temp:  [97 8 °F (36 6 °C)-98 5 °F (36 9 °C)] 97 8 °F (36 6 °C)  HR:  [67-78] 67  Resp:  [18-19] 18  BP: (101-136)/(59-80) 136/79  SpO2:  [97 %-98 %] 97 %  Body mass index is 17 56 kg/m²  Input and Output Summary (last 24 hours): Intake/Output Summary (Last 24 hours) at 1/9/2022 1230  Last data filed at 1/9/2022 0300  Gross per 24 hour   Intake 456 67 ml   Output 1600 ml   Net -1143 33 ml       Physical Exam:   PHYSICAL EXAM:    Vitals signs reviewed  Constitutional   Awake and cooperative  NAD  Head/Neck   Normocephalic  Atraumatic  HEENT   No scleral icterus  EOMI  Heart  Chest wall   Regular rate and rhythm  No murmers  Port in right anterior upper chest wall  No surrounding erythema, fluctuance, or drainage  Lungs     Clear to auscultation bilaterally  Respirations unlaboured  Abdomen   Soft  Nontender  Nondistended  Skin   Skin color normal  No rashes  Extremities   No deformities  No peripheral edema  Neuro   Alert and oriented  No new deficits  Psych   Mood stable  Affect normal          Additional Data:     Labs:  Results from last 7 days   Lab Units 01/09/22  0758 01/09/22 0538 01/09/22  0538 01/08/22  1113 01/07/22  0921   WBC Thousand/uL  --   --  2 51*   < > 2 94*   HEMOGLOBIN g/dL 6 9*   < > 6 9*   < > 8 1*   HEMATOCRIT % 25 1*   < > 24 2*   < > 28 9*   PLATELETS Thousands/uL  --   --  149   < > 169   NEUTROS PCT %  --   --   --   --  65   LYMPHS PCT %  --   --   --   --  22   MONOS PCT %  --   --   --   --  11   EOS PCT %  --   --   --   --  1    < > = values in this interval not displayed       Results from last 7 days   Lab Units 01/09/22  0538 01/08/22  1113 01/08/22  1113   SODIUM mmol/L 136   < > 133*   POTASSIUM mmol/L 4 5   < > 4 8   CHLORIDE mmol/L 103   < > 99*   CO2 mmol/L 27   < > 25   BUN mg/dL 22   < > 20   CREATININE mg/dL 1 05   < > 1 18   ANION GAP mmol/L 6   < > 9   CALCIUM mg/dL 7 8*   < > 7 5* ALBUMIN g/dL  --   --  2 1*   TOTAL BILIRUBIN mg/dL  --   --  0 42   ALK PHOS U/L  --   --  228*   ALT U/L  --   --  105*   AST U/L  --   --  42   GLUCOSE RANDOM mg/dL 289*   < > 236*    < > = values in this interval not displayed       Results from last 7 days   Lab Units 01/07/22  0921   INR  1 07     Results from last 7 days   Lab Units 01/09/22  1059 01/09/22  0459 01/08/22  2341 01/08/22  2100 01/08/22  1626 01/08/22  1044 01/08/22  0639 01/08/22  0109   POC GLUCOSE mg/dl 251* 308* 331* 329* 196* 250* 128 162*         Results from last 7 days   Lab Units 01/08/22  1113 01/07/22  0921   LACTIC ACID mmol/L  --  1 6   PROCALCITONIN ng/ml 0 24 0 21       Lines/Drains:  Invasive Devices  Report    Central Venous Catheter Line            Port A Cath Right Chest -- days          Peripheral Intravenous Line            Peripheral IV 01/07/22 Left Forearm 2 days          Drain            Ureteral Drain/Stent Right ureter 6 Fr  704 days                Central Line:  Goal for removal: N/A - Chronic PICC             Imaging: Reviewed radiology reports from this admission including: chest xray and abdominal/pelvic CT    Recent Cultures (last 7 days):   Results from last 7 days   Lab Units 01/07/22 0921   GRAM STAIN RESULT  Gram positive cocci in clusters*  Gram positive cocci in clusters*       Last 24 Hours Medication List:   Current Facility-Administered Medications   Medication Dose Route Frequency Provider Last Rate    acetaminophen  650 mg Oral Q4H PRN Gene Carrillo MD      ALPRAZolam  1 mg Oral BID Gene Carrillo MD      aluminum-magnesium hydroxide-simethicone  30 mL Oral Q4H PRN WAYNE Loera      apixaban  5 mg Oral BID Gene Carrillo MD      atovaquone  1,500 mg Oral Daily Gene Carrillo MD      cefazolin  2,000 mg Intravenous Q8H Angella Read MD 2,000 mg (01/09/22 0813)    DAPTOmycin  8 mg/kg Intravenous Q24H Angella Read  mg (01/09/22 1130)    hydrocortisone sodium succinate  50 mg Intravenous [facility] Rui Landrum MD      hydrOXYzine HCL  50 mg Oral Q6H PRN WAYNE Gold      insulin lispro  1-5 Units Subcutaneous 4x Daily (AC & HS) WAYNE Gold      insulin lispro  1-5 Units Subcutaneous TID [facility] Nadine Gunderson,       insulin lispro  1-5 Units Subcutaneous HS Nadine Gunderson,       lidocaine  2 patch Topical Daily Rui Landrum MD      oxyCODONE  10 mg Oral Q4H PRN Nadine Gunderson, DO      oxyCODONE  5 mg Oral Q6H PRN Nadine Gunderson DO      pancrelipase (Lip-Prot-Amyl)  48,000 Units Oral TID With Meals Nadine Gunderson DO      pantoprazole  40 mg Oral Early Morning Rui Landrum MD      polyethylene glycol  17 g Oral BID PRN Rui Landrum MD      pregabalin  150 mg Oral TID Rui Landrum MD      sodium chloride  4 mL Nebulization BID Nadine Gunderson DO      tacrolimus  3 mg Oral BID Rui Landrum MD      traZODone  25 mg Oral HS Rui Landrum MD      valACYclovir  500 mg Oral Q24H Fabian Russo MD          Today, Patient Was Seen By: Nadine Gunderson DO    **Please Note: This note may have been constructed using a voice recognition system  **

## 2022-01-10 ENCOUNTER — APPOINTMENT (INPATIENT)
Dept: NON INVASIVE DIAGNOSTICS | Facility: HOSPITAL | Age: 47
DRG: 314 | End: 2022-01-10
Attending: RADIOLOGY
Payer: COMMERCIAL

## 2022-01-10 PROBLEM — M79.2 NEUROPATHIC PAIN OF RIGHT FOOT: Status: ACTIVE | Noted: 2022-01-10

## 2022-01-10 LAB
ABO GROUP BLD BPU: NORMAL
ALBUMIN SERPL BCP-MCNC: 2.5 G/DL (ref 3.5–5)
ALP SERPL-CCNC: 189 U/L (ref 46–116)
ALT SERPL W P-5'-P-CCNC: 48 U/L (ref 12–78)
ANION GAP SERPL CALCULATED.3IONS-SCNC: 9 MMOL/L (ref 4–13)
AST SERPL W P-5'-P-CCNC: 21 U/L (ref 5–45)
BASOPHILS # BLD AUTO: 0.01 THOUSANDS/ΜL (ref 0–0.1)
BASOPHILS NFR BLD AUTO: 0 % (ref 0–1)
BILIRUB SERPL-MCNC: 0.26 MG/DL (ref 0.2–1)
BPU ID: NORMAL
BUN SERPL-MCNC: 19 MG/DL (ref 5–25)
CALCIUM ALBUM COR SERPL-MCNC: 9.8 MG/DL (ref 8.3–10.1)
CALCIUM SERPL-MCNC: 8.6 MG/DL (ref 8.3–10.1)
CHLORIDE SERPL-SCNC: 102 MMOL/L (ref 100–108)
CK SERPL-CCNC: 34 U/L (ref 26–192)
CO2 SERPL-SCNC: 27 MMOL/L (ref 21–32)
CREAT SERPL-MCNC: 1.06 MG/DL (ref 0.6–1.3)
CROSSMATCH: NORMAL
EOSINOPHIL # BLD AUTO: 0 THOUSAND/ΜL (ref 0–0.61)
EOSINOPHIL NFR BLD AUTO: 0 % (ref 0–6)
ERYTHROCYTE [DISTWIDTH] IN BLOOD BY AUTOMATED COUNT: 16.5 % (ref 11.6–15.1)
GFR SERPL CREATININE-BSD FRML MDRD: 64 ML/MIN/1.73SQ M
GLUCOSE SERPL-MCNC: 177 MG/DL (ref 65–140)
GLUCOSE SERPL-MCNC: 213 MG/DL (ref 65–140)
GLUCOSE SERPL-MCNC: 250 MG/DL (ref 65–140)
GLUCOSE SERPL-MCNC: 252 MG/DL (ref 65–140)
GLUCOSE SERPL-MCNC: 256 MG/DL (ref 65–140)
GLUCOSE SERPL-MCNC: 298 MG/DL (ref 65–140)
GLUCOSE SERPL-MCNC: 375 MG/DL (ref 65–140)
HCT VFR BLD AUTO: 31 % (ref 34.8–46.1)
HGB BLD-MCNC: 9.2 G/DL (ref 11.5–15.4)
IMM GRANULOCYTES # BLD AUTO: 0.03 THOUSAND/UL (ref 0–0.2)
IMM GRANULOCYTES NFR BLD AUTO: 1 % (ref 0–2)
LYMPHOCYTES # BLD AUTO: 1.56 THOUSANDS/ΜL (ref 0.6–4.47)
LYMPHOCYTES NFR BLD AUTO: 41 % (ref 14–44)
MCH RBC QN AUTO: 24.3 PG (ref 26.8–34.3)
MCHC RBC AUTO-ENTMCNC: 29.7 G/DL (ref 31.4–37.4)
MCV RBC AUTO: 82 FL (ref 82–98)
MONOCYTES # BLD AUTO: 0.25 THOUSAND/ΜL (ref 0.17–1.22)
MONOCYTES NFR BLD AUTO: 7 % (ref 4–12)
NEUTROPHILS # BLD AUTO: 2 THOUSANDS/ΜL (ref 1.85–7.62)
NEUTS SEG NFR BLD AUTO: 51 % (ref 43–75)
NRBC BLD AUTO-RTO: 0 /100 WBCS
PLATELET # BLD AUTO: 180 THOUSANDS/UL (ref 149–390)
PMV BLD AUTO: 10.5 FL (ref 8.9–12.7)
POTASSIUM SERPL-SCNC: 4 MMOL/L (ref 3.5–5.3)
PROT SERPL-MCNC: 7.1 G/DL (ref 6.4–8.2)
RBC # BLD AUTO: 3.78 MILLION/UL (ref 3.81–5.12)
SODIUM SERPL-SCNC: 138 MMOL/L (ref 136–145)
UNIT DISPENSE STATUS: NORMAL
UNIT PRODUCT CODE: NORMAL
UNIT PRODUCT VOLUME: 350 ML
UNIT RH: NORMAL
WBC # BLD AUTO: 3.85 THOUSAND/UL (ref 4.31–10.16)

## 2022-01-10 PROCEDURE — 99233 SBSQ HOSP IP/OBS HIGH 50: CPT | Performed by: INTERNAL MEDICINE

## 2022-01-10 PROCEDURE — 76937 US GUIDE VASCULAR ACCESS: CPT

## 2022-01-10 PROCEDURE — 36590 REMOVAL TUNNELED CV CATH: CPT

## 2022-01-10 PROCEDURE — 77001 FLUOROGUIDE FOR VEIN DEVICE: CPT | Performed by: STUDENT IN AN ORGANIZED HEALTH CARE EDUCATION/TRAINING PROGRAM

## 2022-01-10 PROCEDURE — 85025 COMPLETE CBC W/AUTO DIFF WBC: CPT | Performed by: INTERNAL MEDICINE

## 2022-01-10 PROCEDURE — C1751 CATH, INF, PER/CENT/MIDLINE: HCPCS

## 2022-01-10 PROCEDURE — 82948 REAGENT STRIP/BLOOD GLUCOSE: CPT

## 2022-01-10 PROCEDURE — 02PA33Z REMOVAL OF INFUSION DEVICE FROM HEART, PERCUTANEOUS APPROACH: ICD-10-PCS | Performed by: STUDENT IN AN ORGANIZED HEALTH CARE EDUCATION/TRAINING PROGRAM

## 2022-01-10 PROCEDURE — 87070 CULTURE OTHR SPECIMN AEROBIC: CPT | Performed by: INTERNAL MEDICINE

## 2022-01-10 PROCEDURE — 36590 REMOVAL TUNNELED CV CATH: CPT | Performed by: STUDENT IN AN ORGANIZED HEALTH CARE EDUCATION/TRAINING PROGRAM

## 2022-01-10 PROCEDURE — 02H633Z INSERTION OF INFUSION DEVICE INTO RIGHT ATRIUM, PERCUTANEOUS APPROACH: ICD-10-PCS | Performed by: STUDENT IN AN ORGANIZED HEALTH CARE EDUCATION/TRAINING PROGRAM

## 2022-01-10 PROCEDURE — NC001 PR NO CHARGE: Performed by: STUDENT IN AN ORGANIZED HEALTH CARE EDUCATION/TRAINING PROGRAM

## 2022-01-10 PROCEDURE — 99152 MOD SED SAME PHYS/QHP 5/>YRS: CPT

## 2022-01-10 PROCEDURE — 80053 COMPREHEN METABOLIC PANEL: CPT | Performed by: INTERNAL MEDICINE

## 2022-01-10 PROCEDURE — 99232 SBSQ HOSP IP/OBS MODERATE 35: CPT | Performed by: INTERNAL MEDICINE

## 2022-01-10 PROCEDURE — 94760 N-INVAS EAR/PLS OXIMETRY 1: CPT

## 2022-01-10 PROCEDURE — 82550 ASSAY OF CK (CPK): CPT | Performed by: INTERNAL MEDICINE

## 2022-01-10 PROCEDURE — 99223 1ST HOSP IP/OBS HIGH 75: CPT | Performed by: PSYCHIATRY & NEUROLOGY

## 2022-01-10 PROCEDURE — 36556 INSERT NON-TUNNEL CV CATH: CPT | Performed by: STUDENT IN AN ORGANIZED HEALTH CARE EDUCATION/TRAINING PROGRAM

## 2022-01-10 PROCEDURE — 94640 AIRWAY INHALATION TREATMENT: CPT

## 2022-01-10 PROCEDURE — 76937 US GUIDE VASCULAR ACCESS: CPT | Performed by: STUDENT IN AN ORGANIZED HEALTH CARE EDUCATION/TRAINING PROGRAM

## 2022-01-10 PROCEDURE — 77001 FLUOROGUIDE FOR VEIN DEVICE: CPT

## 2022-01-10 PROCEDURE — 36556 INSERT NON-TUNNEL CV CATH: CPT

## 2022-01-10 PROCEDURE — 99153 MOD SED SAME PHYS/QHP EA: CPT

## 2022-01-10 RX ORDER — FENTANYL CITRATE 50 UG/ML
INJECTION, SOLUTION INTRAMUSCULAR; INTRAVENOUS CODE/TRAUMA/SEDATION MEDICATION
Status: COMPLETED | OUTPATIENT
Start: 2022-01-10 | End: 2022-01-10

## 2022-01-10 RX ORDER — PREGABALIN 75 MG/1
75 CAPSULE ORAL 3 TIMES DAILY
Status: DISCONTINUED | OUTPATIENT
Start: 2022-01-10 | End: 2022-01-11

## 2022-01-10 RX ORDER — DIPHENHYDRAMINE HYDROCHLORIDE 50 MG/ML
25 INJECTION INTRAMUSCULAR; INTRAVENOUS EVERY 6 HOURS PRN
Status: DISCONTINUED | OUTPATIENT
Start: 2022-01-10 | End: 2022-01-14 | Stop reason: HOSPADM

## 2022-01-10 RX ORDER — OXCARBAZEPINE 150 MG/1
150 TABLET, FILM COATED ORAL EVERY 12 HOURS SCHEDULED
Status: DISCONTINUED | OUTPATIENT
Start: 2022-01-10 | End: 2022-01-11

## 2022-01-10 RX ORDER — MIDAZOLAM HYDROCHLORIDE 2 MG/2ML
INJECTION, SOLUTION INTRAMUSCULAR; INTRAVENOUS CODE/TRAUMA/SEDATION MEDICATION
Status: COMPLETED | OUTPATIENT
Start: 2022-01-10 | End: 2022-01-10

## 2022-01-10 RX ADMIN — ALUMINUM HYDROXIDE, MAGNESIUM HYDROXIDE, AND SIMETHICONE 30 ML: 200; 200; 20 SUSPENSION ORAL at 17:43

## 2022-01-10 RX ADMIN — MIDAZOLAM HYDROCHLORIDE 1 MG: 1 INJECTION, SOLUTION INTRAMUSCULAR; INTRAVENOUS at 09:35

## 2022-01-10 RX ADMIN — FENTANYL CITRATE 50 MCG: 50 INJECTION, SOLUTION INTRAMUSCULAR; INTRAVENOUS at 09:43

## 2022-01-10 RX ADMIN — MIDAZOLAM HYDROCHLORIDE 1 MG: 1 INJECTION, SOLUTION INTRAMUSCULAR; INTRAVENOUS at 09:42

## 2022-01-10 RX ADMIN — DIPHENHYDRAMINE HYDROCHLORIDE 25 MG: 50 INJECTION, SOLUTION INTRAMUSCULAR; INTRAVENOUS at 13:58

## 2022-01-10 RX ADMIN — CEFAZOLIN SODIUM 2000 MG: 2 SOLUTION INTRAVENOUS at 04:32

## 2022-01-10 RX ADMIN — OXCARBAZEPINE 150 MG: 150 TABLET, FILM COATED ORAL at 20:48

## 2022-01-10 RX ADMIN — PANCRELIPASE 48000 UNITS: 24000; 76000; 120000 CAPSULE, DELAYED RELEASE PELLETS ORAL at 17:33

## 2022-01-10 RX ADMIN — PREGABALIN 150 MG: 75 CAPSULE ORAL at 10:29

## 2022-01-10 RX ADMIN — LIDOCAINE 5% 2 PATCH: 700 PATCH TOPICAL at 10:30

## 2022-01-10 RX ADMIN — OXYCODONE HYDROCHLORIDE 10 MG: 10 TABLET ORAL at 20:47

## 2022-01-10 RX ADMIN — SODIUM CHLORIDE SOLN NEBU 3% 4 ML: 3 NEBU SOLN at 21:26

## 2022-01-10 RX ADMIN — DAPTOMYCIN 350 MG: 500 INJECTION, POWDER, LYOPHILIZED, FOR SOLUTION INTRAVENOUS at 12:21

## 2022-01-10 RX ADMIN — FENTANYL CITRATE 50 MCG: 50 INJECTION, SOLUTION INTRAMUSCULAR; INTRAVENOUS at 09:35

## 2022-01-10 RX ADMIN — PREGABALIN 150 MG: 75 CAPSULE ORAL at 16:47

## 2022-01-10 RX ADMIN — CEFAZOLIN SODIUM 2000 MG: 2 SOLUTION INTRAVENOUS at 20:47

## 2022-01-10 RX ADMIN — OXYCODONE HYDROCHLORIDE 10 MG: 10 TABLET ORAL at 11:06

## 2022-01-10 RX ADMIN — APIXABAN 5 MG: 5 TABLET, FILM COATED ORAL at 17:42

## 2022-01-10 RX ADMIN — APIXABAN 5 MG: 5 TABLET, FILM COATED ORAL at 10:29

## 2022-01-10 RX ADMIN — SODIUM CHLORIDE SOLN NEBU 3% 4 ML: 3 NEBU SOLN at 08:32

## 2022-01-10 RX ADMIN — INSULIN LISPRO 1 UNITS: 100 INJECTION, SOLUTION INTRAVENOUS; SUBCUTANEOUS at 12:07

## 2022-01-10 RX ADMIN — ACETAMINOPHEN 650 MG: 325 TABLET, FILM COATED ORAL at 22:29

## 2022-01-10 RX ADMIN — OXYCODONE HYDROCHLORIDE 10 MG: 10 TABLET ORAL at 04:59

## 2022-01-10 RX ADMIN — HYDROCORTISONE SODIUM SUCCINATE 50 MG: 100 INJECTION, POWDER, FOR SOLUTION INTRAMUSCULAR; INTRAVENOUS at 13:58

## 2022-01-10 RX ADMIN — TACROLIMUS 3 MG: 0.5 CAPSULE ORAL at 10:32

## 2022-01-10 RX ADMIN — PREGABALIN 75 MG: 75 CAPSULE ORAL at 20:48

## 2022-01-10 RX ADMIN — PANTOPRAZOLE SODIUM 40 MG: 40 TABLET, DELAYED RELEASE ORAL at 06:15

## 2022-01-10 RX ADMIN — VALACYCLOVIR HYDROCHLORIDE 500 MG: 500 TABLET, FILM COATED ORAL at 13:59

## 2022-01-10 RX ADMIN — ACETAMINOPHEN 650 MG: 325 TABLET, FILM COATED ORAL at 06:23

## 2022-01-10 RX ADMIN — HYDROCORTISONE SODIUM SUCCINATE 50 MG: 100 INJECTION, POWDER, FOR SOLUTION INTRAMUSCULAR; INTRAVENOUS at 21:32

## 2022-01-10 RX ADMIN — OXYCODONE HYDROCHLORIDE 10 MG: 10 TABLET ORAL at 16:47

## 2022-01-10 RX ADMIN — PANCRELIPASE 48000 UNITS: 24000; 76000; 120000 CAPSULE, DELAYED RELEASE PELLETS ORAL at 12:06

## 2022-01-10 RX ADMIN — CEFAZOLIN SODIUM 2000 MG: 2 SOLUTION INTRAVENOUS at 14:20

## 2022-01-10 RX ADMIN — Medication 10 ML: at 09:46

## 2022-01-10 RX ADMIN — HYDROCORTISONE SODIUM SUCCINATE 50 MG: 100 INJECTION, POWDER, FOR SOLUTION INTRAMUSCULAR; INTRAVENOUS at 06:15

## 2022-01-10 RX ADMIN — ATOVAQUONE 1500 MG: 750 SUSPENSION ORAL at 10:31

## 2022-01-10 RX ADMIN — TACROLIMUS 3 MG: 0.5 CAPSULE ORAL at 17:43

## 2022-01-10 NOTE — ASSESSMENT & PLAN NOTE
- Septic on presentation, tachycardia, tachypnea, febrile, hypotensive  - Blood cultures x2 on presentation positive for Staphylococcus schleiferi and staphylococcus pseudintermedius  - Repeat blood cultures x2 01/09/2022 pending  - S/p port removal 01/10/2022  - Plan to place PICC line after blood cultures negative x 72 hours; plan to replace port after antibiotic course completed  - DEJUAN deferred per primary team   - Antibiotic management per primary team

## 2022-01-10 NOTE — PROGRESS NOTES
Interventional Radiology Preprocedure Note    History/Indication for procedure:   Fidelia Jacques is a 42 y o  female with a PMH of CF and positive blood cultures who presents for port removal     She also has a history of difficult venous access  Relevant past medical history:    Past Medical History:   Diagnosis Date    ABPA (allergic bronchopulmonary aspergillosis) ([facility] [address] ) 7/8/2012    [facility] 9/8/2017    Acute on chronic respiratory failure ([facility] ) 3/4/2017    Last Assessment & Plan:  Formatting of this note might be different from the original  Recurrent with clinical worsening with enlarging PTX, s/p multiple chest tubes, but new fevers 6/19 and worsening leukocytosis indicating infection as cause of 6/19 decompensation with profound hypoxia in setting of shunting through right lung and continued pneumothorax s/p 2nd chest tube placement    6/27: Right    Asthma     Chronic pain     Cystic fibrosis ([facility] [address] )     Diabetes mellitus [facility]     GERD (gastroesophageal reflux disease)     HBP (high blood pressure)     HCAP (healthcare-associated pneumonia) 3/6/2020    Influenza B 2/23/2020    Kidney stone     Neutropenic fever ([facility] [address] ) 9/26/2020    PONV (postoperative nausea and vomiting)     Pseudomonas aeruginosa infection 9/20/2012    Transplant recipient     Lung- 2013, 2017     Patient Active Problem List   Diagnosis    Lung transplant status, bilateral ([facility] [address] )    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, with long-term current use of insulin ([facility] [address] )    Abnormal CT scan, colitis    Pancreatic insufficiency    Asthma    Chronic pain disorder    DVT (deep venous thrombosis) (HCC)    Acute on chronic anemia    Long term current use of bisphosphonates    Long term current use of systemic steroids    Osteoporosis    Anxiety    Chronic kidney disease    Chronic sinusitis    CMV (cytomegalovirus infection) status negative    Colon polyp    Cytomegalovirus (CMV) viremia (HCC)    Esophageal reflux    Hiatal hernia    Immunosuppression (HCC)    Thrombocytopenia (HCC)    Severe protein-calorie malnutrition (HCC)    Transplant rejection    Cystic fibrosis ([address]    Essential hypertension    Hypercholesteremia    Chronic respiratory failure with hypoxia (HCC)    Sepsis (HCC)    Fatigue    Chest pain    Weakness of right lower extremity    Gram-positive cocci bacteremia    Moderate protein-calorie malnutrition (HCC)       /77   Pulse 68   Temp 98 3 °F (36 8 °C)   Resp 15   Ht 5' 2" (1 575 m)   Wt 43 5 kg (96 lb)   SpO2 98%   BMI 17 56 kg/m²     Medications:    Inpatient Medications:     Scheduled Medications:  Current Facility-Administered Medications   Medication Dose Route Frequency Provider Last Rate    acetaminophen  650 mg Oral Q4H PRN Blanca Beard MD      ALPRAZolam  1 mg Oral BID Blanca Beard MD      aluminum-magnesium hydroxide-simethicone  30 mL Oral Q4H PRN WAYNE Zamorano      apixaban  5 mg Oral BID Blanca Beard MD      atovaquone  1,500 mg Oral Daily Blanca Beard MD      cefazolin  2,000 mg Intravenous Q8H Denilson Adams MD 2,000 mg (01/10/22 0432)    DAPTOmycin  8 mg/kg Intravenous Q24H Denilson Adams  mg (01/09/22 1130)    hydrocortisone sodium succinate  50 mg Intravenous [facility] Blanca Beard MD      hydrOXYzine HCL  50 mg Oral Q6H PRN WAYNE Zamorano      insulin lispro  1-5 Units Subcutaneous 4x Daily (AC & HS) WAYNE Zamorano      insulin lispro  1-5 Units Subcutaneous TID [facility] Peggysaúl Gunderson, DO      insulin lispro  1-5 Units Subcutaneous HS Pegarmidae Parviz Gunderson, DO      lidocaine  2 patch Topical Daily Blanca Beard MD      oxyCODONE  10 mg Oral Q4H PRN Pegarmidae Parviz Gunderson, DO      oxyCODONE  5 mg Oral Q6H PRN Pegdian Gunderson, DO      pancrelipase (Lip-Prot-Amyl)  48,000 Units Oral TID With Meals Jon Gabriel DO      pantoprazole  40 mg Oral Early Morning Nery Mccormick MD      polyethylene glycol  17 g Oral BID PRN Nery Mccormick MD      pregabalin  150 mg Oral TID Nery Mccormick MD      sodium chloride  4 mL Nebulization BID [place] Hoof Neptali, DO      tacrolimus  3 mg Oral BID Nery Mccormick MD      traZODone  25 mg Oral HS Nery Mccormick MD      valACYclovir  500 mg Oral Q24H Franchesca Miramotnes MD         Infusions:       PRN:    acetaminophen    aluminum-magnesium hydroxide-simethicone    hydrOXYzine HCL    oxyCODONE    oxyCODONE    polyethylene glycol    Outpatient Medications:  No current facility-administered medications on file prior to encounter       Current Outpatient Medications on File Prior to Encounter   Medication Sig Dispense Refill    acetaminophen (TYLENOL) 325 mg tablet Take 3 tablets (975 mg total) by mouth every 8 (eight) hours  0    acetylcysteine (MUCOMYST) 200 mg/mL nebulizer solution Take 2 mL (400 mg total) by nebulization 2 (two) times a day  0    albuterol (2 5 mg/3 mL) 0 083 % nebulizer solution Take 6 mL (5 mg total) by nebulization 2 (two) times a day  0    ALPRAZolam (XANAX) 1 mg tablet Take 1 tablet (1 mg total) by mouth 2 (two) times a day 60 tablet 1    apixaban (ELIQUIS) 5 mg Take 1 tablet (5 mg total) by mouth 2 (two) times a day  0    atovaquone (MEPRON) 750 mg/5 mL suspension Take 1,500 mg by mouth daily      capsicum (ZOSTRIX) 0 075 % topical cream Apply topically 3 (three) times a day 28 3 g 0    dronabinol (MARINOL) 5 MG capsule TAKE 1 CAPSULE (5 MG TOTAL) BY MOUTH 3 (THREE) TIMES A DAY 90 capsule 3    famotidine (PEPCID) 20 mg tablet Take 20 mg by mouth 2 (two) times a day      insulin lispro (HumaLOG) 100 units/mL injection Inject 4 Units under the skin 3 (three) times a day with meals  0    lidocaine (LIDODERM) 5 % Apply 2 patches topically daily Remove & Discard patch within 12 hours or as directed by MD  0    oxyCODONE (ROXICODONE) 10 MG TABS Take 10 mg by mouth      pancrelipase, Lip-Prot-Amyl, (CREON) 24,000 units Take 48,000 Units by mouth 3 (three) times a day with meals      pantoprazole (PROTONIX) 40 mg tablet Take 1 tablet (40 mg total) by mouth daily in the early morning  0    polyethylene glycol (MIRALAX) 17 g packet Take 17 g by mouth 2 (two) times a day as needed      predniSONE 10 mg tablet Take 10 mg by mouth daily      pregabalin (LYRICA) 75 mg capsule Take 150 mg by mouth Three times a day        Respiratory Therapy Supplies (Nebulizer) SUKHI Please dispense nebulizer machine      sodium chloride 3 % inhalation solution Inhale 4 mL 2 (two) times a day      SYRINGE-NEEDLE, DISP, 3 ML 23G X 1" 3 ML MISC Use twice daily to draw up acetylcysteine      tacrolimus (PROGRAF) 1 mg capsule Take 3 mg by mouth 2 (two) times a day      traZODone (DESYREL) 50 mg tablet Take 0 5 tablets (25 mg total) by mouth daily at bedtime  0    valACYclovir (VALTREX) 500 mg tablet Take 500 mg by mouth daily         Allergies   Allergen Reactions    Vancomycin Angioedema and Hives    Ceftazidime Headache     Severe headaches after desensitization       Anticoagulants: eliquis    ASA classification: ASA 3 - Patient with moderate systemic disease with functional limitations    Airway Assessment: II (hard and soft palate, upper portion of tonsils anduvula visible)    Relevant family history: None    Relevant review of systems: None    Prior sedation/anesthesia: yes    Can the patient lie flat?  Yes     NPO Status: yes    Labs:   CBC with diff:   Lab Results   Component Value Date    WBC 3 85 (L) 01/10/2022    HGB 9 2 (L) 01/10/2022    HCT 31 0 (L) 01/10/2022    MCV 82 01/10/2022     01/10/2022    MCH 24 3 (L) 01/10/2022    MCHC 29 7 (L) 01/10/2022    RDW 16 5 (H) 01/10/2022    MPV 10 5 01/10/2022    NRBC 0 01/10/2022     BMP/CMP:  Lab Results   Component Value Date    K 4 0 01/10/2022     01/10/2022    CO2 27 01/10/2022    BUN 19 01/10/2022 CREATININE 1 06 01/10/2022    CALCIUM 8 6 01/10/2022    AST 21 01/10/2022    ALT 48 01/10/2022    ALKPHOS 189 (H) 01/10/2022    EGFR 64 01/10/2022   ,     Coags:   Lab Results   Component Value Date    PTT 28 01/07/2022    INR 1 07 01/07/2022   ,   Results from last 7 days   Lab Units 01/07/22  0921   PTT seconds 28   INR  1 07        Relevant imaging studies:   Reviewed  Directed physical examination:  I agree with the physical exam performed on 1/9/22 and there are no additional changes  Assessment/Plan:   Port removal   CVC placement  Sedation/Anesthesia plan: Moderate sedation will be used as needed for procedure  Consent with alternatives to the procedure, risks and benefits have been explained and discussed with the patient/patient's family: yes

## 2022-01-10 NOTE — ASSESSMENT & PLAN NOTE
Mathieu Whitehead is a 42 y o  female with cystic fibrosis s/p lung transplant in 2013 and 2017, recent right pneumonectomy (mid October 2021), currently on immunosuppressive therapy, DVT on Eliquis who presents to [place] ED on 01/07/2020 to with fever, found to be septic  Neurology asked to evaluate the patient for neuropathic pain of right foot  Patient reports pain began immediately after pneumonectomy in mid October 2021  Etiology unclear at this time  Patient reports she was told this was secondary to compression of the nerve  Other considerations include lumbar radiculopathy vs focal distal neuropathy  Found to have tenderness of right medial ankle, 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  Plan:  - Will obtain EMG testing outpatient, recommend obtaining in 3 weeks after completion of antibiotic treatment   - Lyrica discontinued   - Continue Trileptal 300 mg BID  - PT/OT  - Monitor neuro exam; notify with any changes  - Medical management and supportive care per primary team  Correction of metabolic or infectious disturbances   - Patient does not wish to follow-up with [facility]  Recommend contacting surgeon for a more clear assessment in regards to the details of the surgery and possible complications resulting in right lower extremity atrophy and neuropathic pain    - Patient to follow up with Radha Campos outpatient neurology on discharge

## 2022-01-10 NOTE — PROGRESS NOTES
Progress Note - Infectious Disease   Guille Fortune 42 y o  female MRN: [id number]  Unit/Bed#: -01 Encounter: [id number]      Impression/Plan:  1  Sepsis, POA   Patient presented on admission with high fever along with leukopenia  Ainsley Gain source problem 2  No other obvious sources on exam   Fever curve improved   White blood cell count stable today   Suspect low counts in the setting of acute illness   Most recent CMV PCR in November negative and no other localizing symptoms  Antibiotics as below  Continue to trend fever curve/vitals  Repeat labs tomorrow  Follow-up pending blood cultures  Additional care as per primary  Additional interventions pending clinical course     2  Staph pseudintermedius/schleiferi bacteremia   Two of 2 blood cultures from admission have returned positive with Gram-positive cocci in clusters   Patient's only intravascular device is her port   CT chest, abdomen and pelvis largely unremarkable   Patient reporting some midthoracic right-sided back discomfort  MRI T-spine negative  Otherwise no other localizing findings on exam   Suspected port infection  Patient with severe vancomycin allergy   Fever curve appeared to improve with Zosyn alone   Patient with 3 dogs at home  2D echo poor study, aortic and pulmonic valves not well visualized     Continue daptomycin  Continue Ancef  Continue to trend fever curve/vitals  Repeat labs tomorrow  Follow-up pending blood cultures  May ultimately need DEJUAN as TTE was a poor study  PORT removed this am - will follow up cultures  Patient will eventually require port replacement  Additional care as per primary  Additional interventions pending clinical course     3  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  Patient has had multiple infectious complications and most recently required right-sided pneumonectomy and was treated for left-sided Mycobacterium abscesses infection   CMV mismatch and recent PCRs negative  Still on regular monitoring   Reportedly no change to immunosuppression otherwise  Continue chronic immunosuppression with tacrolimus and prednisone  Continue prophylaxis with atovaquone and Valtrex  Ongoing follow-up by Pulmonary  Continue antibiotics as above  Continue to trend fever curve/WBC  CMV monitoring as outpatient  Recommend regular updates to transplant center  Additional care as per primary     4  Malnutrition and diabetes   Supportive care as per primary       5  Transaminitis  Currently down trending  Likely due to acute illness        6  Anemia  Possibly related to acute illness  Continue to monitor and transfusional support as per primary      Above plan discussed in detail with the patient at bedside      ID consult service will continue to follow      Antibiotics:  Daptomycin/Ancef 3  Total Antibiotics 4    Subjective:  Patient seen on AM rounds  Denies fevers, chills, or sweats  Denies nausea, vomiting, or diarrhea  Just returned from IR, s/p PORT removal    Objective:  Vitals:  Temp:  [98 °F (36 7 °C)-98 5 °F (36 9 °C)] 98 3 °F (36 8 °C)  HR:  [65-79] 71  Resp:  [15-23] 17  BP: (117-178)/(75-98) 137/80  SpO2:  [95 %-100 %] 99 %  Temp (24hrs), Av 3 °F (36 8 °C), Min:98 °F (36 7 °C), Max:98 5 °F (36 9 °C)  Current: Temperature: 98 3 °F (36 8 °C)    Physical Exam:   General:  No acute distress  Psychiatric:  Awake and alert  Pulmonary:  Normal respiratory excursion without accessory muscle use  Heart: +s1/s2  Abdomen:  Soft, nontender  Extremities:  No edema  Skin:  No rashes, central line in place  Lab Results:  I have personally reviewed pertinent labs    Results from last 7 days   Lab Units 01/10/22  0432 22  0538 22  1113 22  0921 22  0921   POTASSIUM mmol/L 4 0 4 5 4 8   < > 3 8   CHLORIDE mmol/L 102 103 99*   < > 103   CO2 mmol/L 27 27 25   < > 29   BUN mg/dL 19 22 20   < > 22   CREATININE mg/dL 1 06 1 05 1 18   < > 1 10   EGFR ml/min/1 73sq m 64 64 56   < > 61   CALCIUM mg/dL 8 6 7 8* 7 5*   < > 7 9*   AST U/L 21  --  42  --  59*   ALT U/L 48  --  105*  --  160*   ALK PHOS U/L 189*  --  228*  --  283*    < > = values in this interval not displayed  Results from last 7 days   Lab Units 01/10/22  0432 01/09/22  2106 01/09/22  0758 01/09/22  0538 01/09/22  0538 01/08/22  1113 01/08/22  1113   WBC Thousand/uL 3 85*  --   --   --  2 51*  --  2 23*   HEMOGLOBIN g/dL 9 2* 8 9* 6 9*   < > 6 9*   < > 7 7*   PLATELETS Thousands/uL 180  --   --   --  149  --  143*    < > = values in this interval not displayed  Results from last 7 days   Lab Units 01/09/22  0524 01/07/22  0921   BLOOD CULTURE  Received in Microbiology Lab  Culture in Progress  Received in Microbiology Lab  Culture in Progress  Staphylococcus schleiferi*  Staphylococcus pseudintermedius*  Staphylococcus pseudintermedius*   GRAM STAIN RESULT   --  Gram positive cocci in clusters*  Gram positive cocci in clusters*       Imaging Studies:   I have personally reviewed pertinent imaging study reports and images in PACS  EKG, Pathology, and Other Studies:   I have personally reviewed pertinent reports

## 2022-01-10 NOTE — PLAN OF CARE
Problem: Potential for Falls  Goal: Patient will remain free of falls  Description: INTERVENTIONS:  - Educate patient/family on patient safety including physical limitations  - Instruct patient to call for assistance with activity   - Consult OT/PT to assist with strengthening/mobility   - Keep Call bell within reach  - Keep bed low and locked with side rails adjusted as appropriate  - Keep care items and personal belongings within reach  - Initiate and maintain comfort rounds  - Make Fall Risk Sign visible to staff  - Offer Toileting every 2 Hours, in advance of need  - Initiate/Maintain alarm  - Obtain necessary fall risk management equipment  - Apply yellow socks and bracelet for high fall risk patients  - Consider moving patient to room near nurses station  Outcome: Progressing     Problem: MOBILITY - ADULT  Goal: Maintain or return to baseline ADL function  Description: INTERVENTIONS:  -  Assess patient's ability to carry out ADLs; assess patient's baseline for ADL function and identify physical deficits which impact ability to perform ADLs (bathing, care of mouth/teeth, toileting, grooming, dressing, etc )  - Assess/evaluate cause of self-care deficits   - Assess range of motion  - Assess patient's mobility; develop plan if impaired  - Assess patient's need for assistive devices and provide as appropriate  - Encourage maximum independence but intervene and supervise when necessary  - Involve family in performance of ADLs  - Assess for home care needs following discharge   - Consider OT consult to assist with ADL evaluation and planning for discharge  - Provide patient education as appropriate  Outcome: Progressing  Goal: Maintains/Returns to pre admission functional level  Description: INTERVENTIONS:  - Perform BMAT or MOVE assessment daily    - Set and communicate daily mobility goal to care team and patient/family/caregiver     - Collaborate with rehabilitation services on mobility goals if consulted  - Perform Range of Motion 3 times a day  - Reposition patient every 2 hours    - Dangle patient 3 times a day  - Stand patient 3 times a day  - Ambulate patient 3 times a day  - Out of bed to chair 3 times a day   - Out of bed for meals 3 times a day  - Out of bed for toileting  - Record patient progress and toleration of activity level   Outcome: Progressing     Problem: PAIN - ADULT  Goal: Verbalizes/displays adequate comfort level or baseline comfort level  Description: Interventions:  - Encourage patient to monitor pain and request assistance  - Assess pain using appropriate pain scale  - Administer analgesics based on type and severity of pain and evaluate response  - Implement non-pharmacological measures as appropriate and evaluate response  - Consider cultural and social influences on pain and pain management  - Notify physician/advanced practitioner if interventions unsuccessful or patient reports new pain  Outcome: Progressing     Problem: INFECTION - ADULT  Goal: Absence or prevention of progression during hospitalization  Description: INTERVENTIONS:  - Assess and monitor for signs and symptoms of infection  - Monitor lab/diagnostic results  - Monitor all insertion sites, i e  indwelling lines, tubes, and drains  - Monitor endotracheal if appropriate and nasal secretions for changes in amount and color  - [place] appropriate cooling/warming therapies per order  - Administer medications as ordered  - Instruct and encourage patient and family to use good hand hygiene technique  - Identify and instruct in appropriate isolation precautions for identified infection/condition  Outcome: Progressing  Goal: Absence of fever/infection during neutropenic period  Description: INTERVENTIONS:  - Monitor WBC    Outcome: Progressing     Problem: DISCHARGE PLANNING  Goal: Discharge to home or other facility with appropriate resources  Description: INTERVENTIONS:  - Identify barriers to discharge w/patient and caregiver  - Arrange for needed discharge resources and transportation as appropriate  - Identify discharge learning needs (meds, wound care, etc )  - Arrange for interpretive services to assist at discharge as needed  - Refer to Case Management Department for coordinating discharge planning if the patient needs post-hospital services based on physician/advanced practitioner order or complex needs related to functional status, cognitive ability, or social support system  Outcome: Progressing     Problem: Knowledge Deficit  Goal: Patient/family/caregiver demonstrates understanding of disease process, treatment plan, medications, and discharge instructions  Description: Complete learning assessment and assess knowledge base    Interventions:  - Provide teaching at level of understanding  - Provide teaching via preferred learning methods  Outcome: Progressing

## 2022-01-10 NOTE — BRIEF OP NOTE (RAD/CATH)
INTERVENTIONAL RADIOLOGY PROCEDURE NOTE    Date: 1/10/2022    Procedure: port removal, nontunneled CVC placement    Preoperative diagnosis:   1  Fever, unknown origin    2  Immunocompromised state due to drug therapy ([facility] )    3  Hypotension    4  Hypomagnesemia    5  SIRS (systemic inflammatory response syndrome) (HCC)    6  Leukopenia    7  Recurrent pneumonia    8  Fever    9  Cystic fibrosis ([facility] )    10  FTT (failure to thrive) in adult    6  Gram-positive bacteremia         Postoperative diagnosis: Same  Surgeon: Arias Edouard MD     Assistant: None  No qualified resident was available  Blood loss: 5 ml    Specimens: sent to lab     Findings:   Port removal; tip sent to lab  RIJ nontunneled CVC insertion given known poor venous access  Complications: None immediate      Anesthesia: conscious sedation

## 2022-01-10 NOTE — PROGRESS NOTES
Progress Note - Pulmonary   Honey Matthew Fortune 42 y o  female MRN: [id number]  Unit/Bed#: -01 Encounter: [id number]    Assessment:  1  Sepsis with bacteremia likely secondary to infected port  2  Right pneumonectomy with left lung transplant secondary to cystic fibrosis  3  Mild intermittent asthma without exacerbation    Plan:  Patient presented with sepsis with T-max 106°  Bacteremia with blood cultures growing Staph schleiferi and Staph pseudointermedius  Source likely infected portacath, now s/p removal  ID following for antibiotic coverage  CT scan on admission shows right pneumonectomy, transplanted left lung without focal consolidation  Stable 3mm lung nodule  Diffuse esophagitis  Continues on tacrolimus and prednisone for lung transplant, atovaquone and valtrex prophylaxis  She is stable from pulmonary standpoint, will follow with [facility] as scheduled upon discharge  Will sign off, please call with questions  Subjective:   Patient resting in bed  She is feeling much better from prior to admission  No shortness of breath or cough  Objective:     Vitals: Blood pressure 127/83, pulse 92, temperature 98 3 °F (36 8 °C), resp  rate 17, height 5' 2" (1 575 m), weight 43 5 kg (96 lb), SpO2 96 %, not currently breastfeeding  ,Body mass index is 17 56 kg/m²        Intake/Output Summary (Last 24 hours) at 1/10/2022 1420  Last data filed at 1/10/2022 1324  Gross per 24 hour   Intake 540 ml   Output 5200 ml   Net -4660 ml       Invasive Devices  Report    Central Venous Catheter Line            CVC Central Lines 01/10/22 Double Right Internal jugular <1 day          Peripheral Intravenous Line            Peripheral IV 01/07/22 Left Forearm 3 days          Drain            Ureteral Drain/Stent Right ureter 6 Fr  706 days                Physical Exam: /83   Pulse 92   Temp 98 3 °F (36 8 °C)   Resp 17   Ht 5' 2" (1 575 m)   Wt 43 5 kg (96 lb)   SpO2 96%   BMI 17 56 kg/m²   General appearance: alert and oriented, in no acute distress  Head: Normocephalic, without obvious abnormality, atraumatic  Eyes: negative findings: conjunctivae and sclerae normal  Lungs: Left sided breath sounds are clear  Heart: regular rate and rhythm  Abdomen: normal findings: soft, non-tender  Extremities: No edema  Skin: Warm and dry  Neurologic: Mental status: Alert, oriented, thought content appropriate     Labs: I have personally reviewed pertinent lab results  , CBC:   Lab Results   Component Value Date    WBC 3 85 (L) 01/10/2022    HGB 9 2 (L) 01/10/2022    HCT 31 0 (L) 01/10/2022    MCV 82 01/10/2022     01/10/2022    MCH 24 3 (L) 01/10/2022    MCHC 29 7 (L) 01/10/2022    RDW 16 5 (H) 01/10/2022    MPV 10 5 01/10/2022    NRBC 0 01/10/2022   , CMP:   Lab Results   Component Value Date    SODIUM 138 01/10/2022    K 4 0 01/10/2022     01/10/2022    CO2 27 01/10/2022    BUN 19 01/10/2022    CREATININE 1 06 01/10/2022    CALCIUM 8 6 01/10/2022    AST 21 01/10/2022    ALT 48 01/10/2022    ALKPHOS 189 (H) 01/10/2022    EGFR 64 01/10/2022     Imaging and other studies: I have personally reviewed pertinent reports     and I have personally reviewed pertinent films in PACS

## 2022-01-10 NOTE — PROGRESS NOTES
[address] [facility]  Progress Note Rod Fothergill Lionel Cocker 1975, 42 y o  female MRN: [id number]  Unit/Bed#: MS Marcum-01 Encounter: [id number]  Primary Care Provider: Tracey Decker MD   Date and time admitted to hospital: 1/7/2022  8:07 AM    * Gram-positive cocci bacteremia  Assessment & Plan  - 2/2 sets positive for Gram-positive cocci on admission  - source is not entirely clear at this time imaging of the chest and abdomen/pelvis was unrevealing  She has no open skin lesions  No recent trauma  MRI of the thoracic spine where she has had some pain was unrevealing  - status post port removal on 01/10    - continue IV daptomycin and cefazolin for now per ID recommendations  - follow-up repeat blood cultures drawn 1/9  - port to be replaced at a later date after antibiotic course completed  - monitor WBC count and fever curve  - may need DEJUAN      Sepsis ([facility] )  Assessment & Plan  - met criteria on admission with tachycardia, tachypnea, fever, hypotension, and now with Gram-positive bacteremia  - treatment plan as above    Acute on chronic anemia  Assessment & Plan  - no active signs or symptoms of bleeding  - patient denies any hematochezia, melena, or hematemesis  Denies any hematuria  Has not had menses for few months      - will check anemia panel  - monitor clinically for signs or symptoms of bleeding  - transfuses 1 unit irradiated PRBCs 1/9    Lung transplant status, bilateral ([facility] [facility] )  Assessment & Plan  - history of lung transplant secondary to cystic fibrosis  - follows with [facility]  - history of pneumonia with Pseudomonas    - continue home tacrolimus, stress dose steroids, for now  - continue prophylactic atovaquone and valacyclovir    Cystic fibrosis ([facility] [facility] )  Assessment & Plan  - History noted    Severe protein-calorie malnutrition ([address] )  Assessment & Plan  - nutrition evaluation    Chronic kidney disease  Assessment & Plan  - renal function at baseline; continue to monitor    Long term current use of systemic steroids  Assessment & Plan  - on long-term systemic steroids due to history of lung transplant  - currently on IV hydrocortisone given current sepsis/infection    DVT (deep venous thrombosis) ([facility])  Assessment & Plan  - Continue Eliquis    Chronic pain disorder  Assessment & Plan  - Continue home meds    Asthma  Assessment & Plan  - Continue duo nebs as needed    Pancreatic insufficiency  Assessment & Plan  - history of cystic fibrosis  - Continue pancrelipase    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, with long-term current use of insulin ([facility] )  Assessment & Plan  - last hemoglobin A1c on record in 2021 showed hemoglobin A1c of 6 1%  - monitor on ISS for now      VTE Pharmacologic Prophylaxis:   High Risk (Score >/= 5) - Pharmacological DVT Prophylaxis Ordered: apixaban (Eliquis)  Sequential Compression Devices Ordered  Patient Centered Rounds: I performed bedside rounds with nursing staff today  Discussions with Specialists or Other Care Team Provider:  Case management  Interventional Radiology    Education and Discussions with Family / Patient: Patient declined call to   Time Spent for Care: 30 minutes  More than 50% of total time spent on counseling and coordination of care as described above  Current Length of Stay: 3 day(s)  Current Patient Status: Inpatient   Certification Statement: The patient will continue to require additional inpatient hospital stay due to Gram-positive bacteremia, sepsis  Discharge Plan: Anticipate discharge in >72 hrs to home  Code Status: Level 1 - Full Code    Subjective:   Patient seen examined  Following up for Gram-positive bacteremia  Patient afebrile overnight  No leukocytosis  Really no physical complaints other than some back pain      Objective:     Vitals:   Temp (24hrs), Av 1 °F (36 7 °C), Min:98 °F (36 7 °C), Max:98 3 °F (36 8 °C)    Temp:  [98 °F (36 7 °C)-98 3 °F (36 8 °C)] 98 °F (36 7 °C)  HR:  [65-92] 77  Resp:  [14-23] 14  BP: (119-178)/(75-98) 165/88  SpO2:  [95 %-100 %] 96 %  Body mass index is 17 56 kg/m²  Input and Output Summary (last 24 hours): Intake/Output Summary (Last 24 hours) at 1/10/2022 1632  Last data filed at 1/10/2022 1609  Gross per 24 hour   Intake 540 ml   Output 5800 ml   Net -5260 ml       Physical Exam:   PHYSICAL EXAM:    Vitals signs reviewed  Constitutional   Awake and cooperative  NAD  Head/Neck   Normocephalic  Atraumatic  HEENT   No scleral icterus  EOMI  Heart     Regular rate and rhythm  No murmurs  Lungs     Clear to auscultation bilaterally  Respirations unlaboured  Abdomen   Soft  Nontender  Nondistended  Skin   Skin color normal  No rashes  Extremities   No deformities  No peripheral edema  Neuro   Alert and oriented  No new deficits  Psych   Mood stable   Affect normal          Additional Data:     Labs:  Results from last 7 days   Lab Units 01/10/22  0432   WBC Thousand/uL 3 85*   HEMOGLOBIN g/dL 9 2*   HEMATOCRIT % 31 0*   PLATELETS Thousands/uL 180   NEUTROS PCT % 51   LYMPHS PCT % 41   MONOS PCT % 7   EOS PCT % 0     Results from last 7 days   Lab Units 01/10/22  0432   SODIUM mmol/L 138   POTASSIUM mmol/L 4 0   CHLORIDE mmol/L 102   CO2 mmol/L 27   BUN mg/dL 19   CREATININE mg/dL 1 06   ANION GAP mmol/L 9   CALCIUM mg/dL 8 6   ALBUMIN g/dL 2 5*   TOTAL BILIRUBIN mg/dL 0 26   ALK PHOS U/L 189*   ALT U/L 48   AST U/L 21   GLUCOSE RANDOM mg/dL 250*     Results from last 7 days   Lab Units 01/07/22  0921   INR  1 07     Results from last 7 days   Lab Units 01/10/22  1043 01/10/22  0550 01/10/22  0042 01/09/22  2103 01/09/22  1551 01/09/22  1059 01/09/22  0459 01/08/22  2341 01/08/22  2100 01/08/22  1626 01/08/22  1044 01/08/22  0639   POC GLUCOSE mg/dl 177* 252* 256* 335* 249* 251* 308* 331* 329* 196* 250* 128         Results from last 7 days   Lab Units 01/08/22  1113 01/07/22  0921   LACTIC ACID mmol/L  --  1 6 PROCALCITONIN ng/ml 0 24 0 21       Lines/Drains:  Invasive Devices  Report    Central Venous Catheter Line            CVC Central Lines 01/10/22 Double Right Internal jugular <1 day          Peripheral Intravenous Line            Peripheral IV 01/07/22 Left Forearm 3 days          Drain            Ureteral Drain/Stent Right ureter 6 Fr  706 days                Central Line:  Goal for removal: N/A - Chronic PICC             Imaging: Reviewed radiology reports from this admission including: chest xray and abdominal/pelvic CT    Recent Cultures (last 7 days):   Results from last 7 days   Lab Units 01/09/22  0524 01/07/22  0921   BLOOD CULTURE  Received in Microbiology Lab  Culture in Progress  Received in Microbiology Lab  Culture in Progress   Staphylococcus schleiferi*  Staphylococcus pseudintermedius*  Staphylococcus pseudintermedius*   GRAM STAIN RESULT   --  Gram positive cocci in clusters*  Gram positive cocci in clusters*       Last 24 Hours Medication List:   Current Facility-Administered Medications   Medication Dose Route Frequency Provider Last Rate    acetaminophen  650 mg Oral Q4H PRN Bernadette Delacruz MD      ALPRAZolam  1 mg Oral BID Bernadette Delacruz MD      aluminum-magnesium hydroxide-simethicone  30 mL Oral Q4H PRN WAYNE Petersen      apixaban  5 mg Oral BID Bernadette Delacruz MD      atovaquone  1,500 mg Oral Daily Bernadette Delacruz MD      cefazolin  2,000 mg Intravenous Q8H Katty Hi MD 2,000 mg (01/10/22 1420)    DAPTOmycin  8 mg/kg Intravenous Q24H Katty Hi  mg (01/10/22 1221)    diphenhydrAMINE  25 mg Intravenous Q6H PRN Jenny Gunderson DO      hydrocortisone sodium succinate  50 mg Intravenous [facility] Bernadette Delacruz MD      hydrOXYzine HCL  50 mg Oral Q6H PRN WAYNE Petersen      insulin lispro  1-5 Units Subcutaneous 4x Daily (AC & HS) WAYNE Petersen      lidocaine  2 patch Topical Daily Bernadette Delacruz MD      oxyCODONE 10 mg Oral Q4H PRN Jael Gunderson,       oxyCODONE  5 mg Oral Q6H PRN Jael Blood Neptali, DO      pancrelipase (Lip-Prot-Amyl)  48,000 Units Oral TID With Meals Jael Gunderson DO      pantoprazole  40 mg Oral Early Morning Jennifer Jones MD      polyethylene glycol  17 g Oral BID PRN Jennifer Jones MD      pregabalin  150 mg Oral TID Jennifer Jones MD      sodium chloride  4 mL Nebulization BID Jael Gunderson DO      tacrolimus  3 mg Oral BID Jennifer Jones MD      traZODone  25 mg Oral HS Jennifer Jones MD      valACYclovir  500 mg Oral Q24H Jihan Moon MD          Today, Patient Was Seen By: Jael Gunderson DO    **Please Note: This note may have been constructed using a voice recognition system  **

## 2022-01-10 NOTE — PLAN OF CARE
Problem: Potential for Falls  Goal: Patient will remain free of falls  Description: INTERVENTIONS:  - Educate patient/family on patient safety including physical limitations  - Instruct patient to call for assistance with activity   - Consult OT/PT to assist with strengthening/mobility   - Keep Call bell within reach  - Keep bed low and locked with side rails adjusted as appropriate  - Keep care items and personal belongings within reach  - Initiate and maintain comfort rounds  - Make Fall Risk Sign visible to staff  - Offer Toileting every 2 Hours, in advance of need  - Initiate/Maintain alarm  - Obtain necessary fall risk management equipment  - Apply yellow socks and bracelet for high fall risk patients  - Consider moving patient to room near nurses station  Outcome: Progressing     Problem: MOBILITY - ADULT  Goal: Maintain or return to baseline ADL function  Description: INTERVENTIONS:  -  Assess patient's ability to carry out ADLs; assess patient's baseline for ADL function and identify physical deficits which impact ability to perform ADLs (bathing, care of mouth/teeth, toileting, grooming, dressing, etc )  - Assess/evaluate cause of self-care deficits   - Assess range of motion  - Assess patient's mobility; develop plan if impaired  - Assess patient's need for assistive devices and provide as appropriate  - Encourage maximum independence but intervene and supervise when necessary  - Involve family in performance of ADLs  - Assess for home care needs following discharge   - Consider OT consult to assist with ADL evaluation and planning for discharge  - Provide patient education as appropriate  Outcome: Progressing  Goal: Maintains/Returns to pre admission functional level  Description: INTERVENTIONS:  - Perform BMAT or MOVE assessment daily    - Set and communicate daily mobility goal to care team and patient/family/caregiver     - Collaborate with rehabilitation services on mobility goals if consulted  - Perform Range of Motion 3 times a day  - Reposition patient every 2 hours    - Dangle patient 3 times a day  - Stand patient 3 times a day  - Ambulate patient 3 times a day  - Out of bed to chair 3 times a day   - Out of bed for meals 3 times a day  - Out of bed for toileting  - Record patient progress and toleration of activity level   Outcome: Progressing     Problem: PAIN - ADULT  Goal: Verbalizes/displays adequate comfort level or baseline comfort level  Description: Interventions:  - Encourage patient to monitor pain and request assistance  - Assess pain using appropriate pain scale  - Administer analgesics based on type and severity of pain and evaluate response  - Implement non-pharmacological measures as appropriate and evaluate response  - Consider cultural and social influences on pain and pain management  - Notify physician/advanced practitioner if interventions unsuccessful or patient reports new pain  Outcome: Progressing     Problem: INFECTION - ADULT  Goal: Absence or prevention of progression during hospitalization  Description: INTERVENTIONS:  - Assess and monitor for signs and symptoms of infection  - Monitor lab/diagnostic results  - Monitor all insertion sites, i e  indwelling lines, tubes, and drains  - Monitor endotracheal if appropriate and nasal secretions for changes in amount and color  - [place] appropriate cooling/warming therapies per order  - Administer medications as ordered  - Instruct and encourage patient and family to use good hand hygiene technique  - Identify and instruct in appropriate isolation precautions for identified infection/condition  Outcome: Progressing  Goal: Absence of fever/infection during neutropenic period  Description: INTERVENTIONS:  - Monitor WBC    Outcome: Progressing     Problem: DISCHARGE PLANNING  Goal: Discharge to home or other facility with appropriate resources  Description: INTERVENTIONS:  - Identify barriers to discharge w/patient and caregiver  - Arrange for needed discharge resources and transportation as appropriate  - Identify discharge learning needs (meds, wound care, etc )  - Arrange for interpretive services to assist at discharge as needed  - Refer to Case Management Department for coordinating discharge planning if the patient needs post-hospital services based on physician/advanced practitioner order or complex needs related to functional status, cognitive ability, or social support system  Outcome: Progressing     Problem: Knowledge Deficit  Goal: Patient/family/caregiver demonstrates understanding of disease process, treatment plan, medications, and discharge instructions  Description: Complete learning assessment and assess knowledge base  Interventions:  - Provide teaching at level of understanding  - Provide teaching via preferred learning methods  Outcome: Progressing     Problem: RESPIRATORY - ADULT  Goal: Achieves optimal ventilation and oxygenation  Description: INTERVENTIONS:  - Assess for changes in respiratory status  - Assess for changes in mentation and behavior  - Position to facilitate oxygenation and minimize respiratory effort  - Oxygen administered by appropriate delivery if ordered  - Initiate smoking cessation education as indicated  - Encourage broncho-pulmonary hygiene including cough, deep breathe, Incentive Spirometry  - Assess the need for suctioning and aspirate as needed  - Assess and instruct to report SOB or any respiratory difficulty  - Respiratory Therapy support as indicated  Outcome: Progressing     Problem: Nutrition/Hydration-ADULT  Goal: Nutrient/Hydration intake appropriate for improving, restoring or maintaining nutritional needs  Description: Monitor and assess patient's nutrition/hydration status for malnutrition  Collaborate with interdisciplinary team and initiate plan and interventions as ordered  Monitor patient's weight and dietary intake as ordered or per policy   Utilize nutrition screening tool and intervene as necessary  Determine patient's food preferences and provide high-protein, high-caloric foods as appropriate       INTERVENTIONS:  - Monitor oral intake, urinary output, labs, and treatment plans  - Assess nutrition and hydration status and recommend course of action  - Evaluate amount of meals eaten  - Assist patient with eating if necessary   - Allow adequate time for meals  - Recommend/ encourage appropriate diets, oral nutritional supplements, and vitamin/mineral supplements  - Order, calculate, and assess calorie counts as needed  - Recommend, monitor, and adjust tube feedings and TPN/PPN based on assessed needs  - Assess need for intravenous fluids  - Provide specific nutrition/hydration education as appropriate  - Include patient/family/caregiver in decisions related to nutrition  Outcome: Progressing

## 2022-01-10 NOTE — ASSESSMENT & PLAN NOTE
Lab Results   Component Value Date    HGBA1C 6 3 (H) 11/08/2021       Recent Labs     01/10/22  0042 01/10/22  0550 01/10/22  1043 01/10/22  1710   POCGLU 256* 252* 177* 298*       Blood Sugar Average: Last 72 hrs:  (P) 251 [id number]     - Medical management per primary team

## 2022-01-10 NOTE — PLAN OF CARE
Problem: MOBILITY - ADULT  Goal: Maintain or return to baseline ADL function  Description: INTERVENTIONS:  -  Assess patient's ability to carry out ADLs; assess patient's baseline for ADL function and identify physical deficits which impact ability to perform ADLs (bathing, care of mouth/teeth, toileting, grooming, dressing, etc )  - Assess/evaluate cause of self-care deficits   - Assess range of motion  - Assess patient's mobility; develop plan if impaired  - Assess patient's need for assistive devices and provide as appropriate  - Encourage maximum independence but intervene and supervise when necessary  - Involve family in performance of ADLs  - Assess for home care needs following discharge   - Consider OT consult to assist with ADL evaluation and planning for discharge  - Provide patient education as appropriate  Outcome: Progressing     Problem: MOBILITY - ADULT  Goal: Maintains/Returns to pre admission functional level  Description: INTERVENTIONS:  - Perform BMAT or MOVE assessment daily    - Set and communicate daily mobility goal to care team and patient/family/caregiver  - Collaborate with rehabilitation services on mobility goals if consulted  - Perform Range of Motion 3 times a day  - Reposition patient every 2 hours    - Dangle patient 3 times a day  - Stand patient 3 times a day  - Ambulate patient 3 times a day  - Out of bed to chair 3 times a day   - Out of bed for meals 3 times a day  - Out of bed for toileting  - Record patient progress and toleration of activity level   Outcome: Progressing     Problem: PAIN - ADULT  Goal: Verbalizes/displays adequate comfort level or baseline comfort level  Description: Interventions:  - Encourage patient to monitor pain and request assistance  - Assess pain using appropriate pain scale  - Administer analgesics based on type and severity of pain and evaluate response  - Implement non-pharmacological measures as appropriate and evaluate response  - Consider cultural and social influences on pain and pain management  - Notify physician/advanced practitioner if interventions unsuccessful or patient reports new pain  Outcome: Progressing     Problem: INFECTION - ADULT  Goal: Absence or prevention of progression during hospitalization  Description: INTERVENTIONS:  - Assess and monitor for signs and symptoms of infection  - Monitor lab/diagnostic results  - Monitor all insertion sites, i e  indwelling lines, tubes, and drains  - Monitor endotracheal if appropriate and nasal secretions for changes in amount and color  - [place] appropriate cooling/warming therapies per order  - Administer medications as ordered  - Instruct and encourage patient and family to use good hand hygiene technique  - Identify and instruct in appropriate isolation precautions for identified infection/condition  Outcome: Progressing     Problem: INFECTION - ADULT  Goal: Absence of fever/infection during neutropenic period  Description: INTERVENTIONS:  - Monitor WBC    Outcome: Progressing     Problem: DISCHARGE PLANNING  Goal: Discharge to home or other facility with appropriate resources  Description: INTERVENTIONS:  - Identify barriers to discharge w/patient and caregiver  - Arrange for needed discharge resources and transportation as appropriate  - Identify discharge learning needs (meds, wound care, etc )  - Arrange for interpretive services to assist at discharge as needed  - Refer to Case Management Department for coordinating discharge planning if the patient needs post-hospital services based on physician/advanced practitioner order or complex needs related to functional status, cognitive ability, or social support system  Outcome: Progressing     Problem: Knowledge Deficit  Goal: Patient/family/caregiver demonstrates understanding of disease process, treatment plan, medications, and discharge instructions  Description: Complete learning assessment and assess knowledge base   Interventions:  - Provide teaching at level of understanding  - Provide teaching via preferred learning methods  Outcome: Progressing     Problem: RESPIRATORY - ADULT  Goal: Achieves optimal ventilation and oxygenation  Description: INTERVENTIONS:  - Assess for changes in respiratory status  - Assess for changes in mentation and behavior  - Position to facilitate oxygenation and minimize respiratory effort  - Oxygen administered by appropriate delivery if ordered  - Initiate smoking cessation education as indicated  - Encourage broncho-pulmonary hygiene including cough, deep breathe, Incentive Spirometry  - Assess the need for suctioning and aspirate as needed  - Assess and instruct to report SOB or any respiratory difficulty  - Respiratory Therapy support as indicated  Outcome: Progressing     Problem: Nutrition/Hydration-ADULT  Goal: Nutrient/Hydration intake appropriate for improving, restoring or maintaining nutritional needs  Description: Monitor and assess patient's nutrition/hydration status for malnutrition  Collaborate with interdisciplinary team and initiate plan and interventions as ordered  Monitor patient's weight and dietary intake as ordered or per policy  Utilize nutrition screening tool and intervene as necessary  Determine patient's food preferences and provide high-protein, high-caloric foods as appropriate       INTERVENTIONS:  - Monitor oral intake, urinary output, labs, and treatment plans  - Assess nutrition and hydration status and recommend course of action  - Evaluate amount of meals eaten  - Assist patient with eating if necessary   - Allow adequate time for meals  - Recommend/ encourage appropriate diets, oral nutritional supplements, and vitamin/mineral supplements  - Order, calculate, and assess calorie counts as needed  - Recommend, monitor, and adjust tube feedings and TPN/PPN based on assessed needs  - Assess need for intravenous fluids  - Provide specific nutrition/hydration education as appropriate  - Include patient/family/caregiver in decisions related to nutrition  Outcome: Progressing

## 2022-01-10 NOTE — CONSULTS
Consultation - Neurology   Bahmanflavia Rodriguez 42 y o  female MRN: [id number]  Unit/Bed#: -01 Encounter: [id number]      Assessment/Plan   Neuropathic pain of right foot  Assessment & Plan  Daisy Patterson is a 42 y o  female with cystic fibrosis s/p lung transplant in 2013 and 2017, recent right pneumonectomy (mid October 2021), currently on immunosuppressive therapy, DVT on Eliquis who presents to [place] ED on 01/07/2020 to with fever, found to be septic  Neurology asked to evaluate the patient for neuropathic pain of right foot  Patient reports pain began immediately after pneumonectomy in mid October 2021  Etiology unclear at this time, possible concern for nerve damage  Would recommend follow-up with [facility] for a more clear assessment in regards to the details of the surgery and possible complications resulting in right lower extremity atrophy and neuropathic pain      Plan:  - Currently on Lyrica 150 mg TID, reduced to 75 mg TID  - Plan to continue Lyrica 75 mg TID x a few days then discontinue   - Trileptal 150 BID then increase as tolerated   - Follow up with neurologist at [facility]     * Gram-positive cocci bacteremia  Assessment & Plan  - Septic on presentation, tachycardia, tachypnea, febrile, hypotensive  - Blood cultures x2 on presentation positive for Staphylococcus schleiferi and staphylococcus pseudintermedius  - Repeat blood cultures x2 01/09/2022 pending  - S/p port removal 01/10/2022  - Considering DEJUAN  - Antibiotic management per primary team    DVT (deep venous thrombosis) ([facility] [address] )  Assessment & Plan  - On Eliquis, plan to continue    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, with long-term current use of insulin [facility])  Assessment & Plan  Lab Results   Component Value Date    HGBA1C 6 3 (H) 11/08/2021       Recent Labs     01/10/22  0042 01/10/22  0550 01/10/22  1043 01/10/22  1710   POCGLU 256* 252* 177* 298*       Blood Sugar Average: Last 72 hrs:  P) 314 [id number]    Lung transplant status, bilateral ([facility] [address] )  Assessment & Plan  - History of lung transplant secondary to cystic fibrosis  - Follows with Mick Ocampo will need follow up in in 4 weeks with general attending  She will not require outpatient neurological testing  Patient to follow up with outpatient neurologist at [facility], patient instructed to to make appointment  History of Present Illness     Reason for Consult / Principal Problem:  Neuropathic pain    HPI: Carmen Tompkins is a 42 y o   female with cystic fibrosis s/p lung transplant in 2013 and 2017, recent right pneumonectomy (mid October 2021), currently on immunosuppressive therapy who presents to [facility] ED on 01/07/2020 to with fever, found to be septic    Per chart review, patient presented to [facility] ED on 01/07/2022 for fever, reporting a fever of 106° F at home  She also reported generalized weakness and mild cough, as well as chills  [facility] recommended patient be admitted at this hospital and treated with IV antibiotics, specifically IV Zosyn  Neurology asked to evaluate the patient for neuropathic pain and right foot  Patient reports that since her pneumone[facility] in mid-October, [facility], she has had neuropathic pain in her right foot  Patient describes the pain as constant and "electric shock"  Patient states that she informed the team at [facility] about the numbness and pain she had been experiencing, however was told that the sensation would resolve on its own with physical therapy  Patient states that she has been in physical therapy with our any resolution of the pain  She states that she has not followed up with any neurologist at [facility]  Unclear who prescribes the Lyrica, patient states she was never evaluated by a neurologist at the time of the procedure  At baseline patient ambulates with a walker at home    She reports that she has been trying to improve the muscle in her right lower extremity, however has been unsuccessful  Per chart review, right pneumonectomy was performed on 10/14/2021  Appears to have reported right foot decreased sensation and tingling down right leg on 10/23/2021  Patient continued to have pain in right leg on 10/25, thought to be muscle tightness in IT band and was started on Lyrica 75 mg and baclofen  Right lower extremity pain persisted on 10/27 and Lyrica was increased from 75 mg to 100 mg  At the time of discharge, patient's main complaint was documented to be the right leg pain, and was recommended to continue on Lyrica, baclofen, icy hot, heat, and massaging  Today patient currently reports significant pain in right foot, pain described as above  Denies CP, SOB, headache, dizziness, vision changes, N/V, abdominal pain, weakness/numbness/tingling in bilateral upper extremities or left lower extremity  Inpatient consult to Neurology  Consult performed by: Mian Naylor PA-C  Consult ordered by: Payam Del Rio DO        Review of Systems  12 point ROS performed, as stated above, all others negative  Historical Information   Past Medical History:   Diagnosis Date    ABPA (allergic bronchopulmonary aspergillosis) ([facility] [address] ) 7/8/2012    Acute kidney injury ([facility] [address] ) 9/8/2017    Acute on chronic respiratory failure ([address] ) 3/4/2017    Last Assessment & Plan:  Formatting of this note might be different from the original  Recurrent with clinical worsening with enlarging PTX, s/p multiple chest tubes, but new fevers 6/19 and worsening leukocytosis indicating infection as cause of 6/19 decompensation with profound hypoxia in setting of shunting through right lung and continued pneumothorax s/p 2nd chest tube placement    6/27: Right    Asthma     Chronic pain     Cystic fibrosis ([address] )     Diabetes mellitus ([address] )     GERD (gastroesophageal reflux disease)     HBP (high blood pressure)     HCAP (healthcare-associated pneumonia) 3/6/2020    Influenza B 2/23/2020    Kidney stone     Neutropenic fever ([address] ) 9/26/2020    PONV (postoperative nausea and vomiting)     Pseudomonas aeruginosa infection 9/20/2012    Transplant recipient     Lung- 2013, 2017     Past Surgical History:   Procedure Laterality Date    APPENDECTOMY      BREAST SURGERY      Aug      BRONCHOSCOPY      CHOLECYSTECTOMY      COLONOSCOPY      EGD  07/2019    Yeast Infect       FL RETROGRADE PYELOGRAM  1/9/2020    FL RETROGRADE PYELOGRAM  2/4/2020    GASTROSTOMY TUBE, PLACE      IR PORT REMOVAL  1/10/2022    LUNG SURGERY      LUNG TRANSPLANT      X2    NV CYSTO/URETERO W/LITHOTRIPSY &INDWELL STENT INSRT Right 2/4/2020    Procedure: CYSTOSCOPY URETEROSCOPY WITH LITHOTRIPSY HOLMIUM LASER, RETROGRADE PYELOGRAM AND INSERTION STENT URETERAL;  Surgeon: Ashley Duarte MD;  Location: [facility];  Service: Urology    NV CYSTOURETHROSCOPY,URETER CATHETER Right 1/9/2020    Procedure: CYSTOSCOPY RETROGRADE PYELOGRAM WITH INSERTION STENT URETERAL;  Surgeon: Ashley Duarte MD;  Location: [facility];  Service: Urology    US GUIDED VASCULAR ACCESS  1/17/2017     Social History   Social History     Substance and Sexual Activity   Alcohol Use Yes    Comment: social     Social History     Substance and Sexual Activity   Drug Use Yes    Frequency: 2 0 times per week    Types: Marijuana    Comment: medical edible marijuana prescribed     E-Cigarette/Vaping    E-Cigarette Use Never User      E-Cigarette/Vaping Substances     Social History     Tobacco Use   Smoking Status Former Smoker    Packs/day: 0 50    Years: 12 00    Pack years: 6 00   Smokeless Tobacco Never Used     Family History:   Family History   Problem Relation Age of Onset    Alcohol abuse Mother     Mental illness Mother     Alcohol abuse Father     COPD Maternal Grandmother     COPD Maternal Grandfather     COPD Paternal Grandmother     Breast cancer Neg Hx     Ovarian cancer Neg Hx     Colon cancer Neg Hx Review of previous medical records was completed      Meds/Allergies   all current active meds have been reviewed, current meds:   Current Facility-Administered Medications   Medication Dose Route Frequency    acetaminophen (TYLENOL) tablet 650 mg  650 mg Oral Q4H PRN    ALPRAZolam (XANAX) tablet 1 mg  1 mg Oral BID    aluminum-magnesium hydroxide-simethicone (MYLANTA) oral suspension 30 mL  30 mL Oral Q4H PRN    apixaban (ELIQUIS) tablet 5 mg  5 mg Oral BID    atovaquone (MEPRON) oral suspension 1,500 mg  1,500 mg Oral Daily    ceFAZolin (ANCEF) IVPB (premix in dextrose) 2,000 mg 50 mL  2,000 mg Intravenous Q8H    DAPTOmycin (CUBICIN) 350 mg in sodium chloride 0 9 % 50 mL IVPB  8 mg/kg Intravenous Q24H    diphenhydrAMINE (BENADRYL) injection 25 mg  25 mg Intravenous Q6H PRN    hydrocortisone sodium succinate (PF) (Solu-CORTEF) injection 50 mg  50 mg Intravenous Q8H [address]    hydrOXYzine HCL (ATARAX) tablet 50 mg  50 mg Oral Q6H PRN    insulin lispro (HumaLOG) 100 units/mL subcutaneous injection 1-5 Units  1-5 Units Subcutaneous 4x Daily (AC & HS)    lidocaine (LIDODERM) 5 % patch 2 patch  2 patch Topical Daily    OXcarbazepine (TRILEPTAL) tablet 150 mg  150 mg Oral Q12H DOTTY    oxyCODONE (ROXICODONE) immediate release tablet 10 mg  10 mg Oral Q4H PRN    oxyCODONE (ROXICODONE) IR tablet 5 mg  5 mg Oral Q6H PRN    pancrelipase (Lip-Prot-Amyl) (CREON) delayed release capsule 48,000 Units  48,000 Units Oral TID With Meals    pantoprazole (PROTONIX) EC tablet 40 mg  40 mg Oral Early Morning    polyethylene glycol (MIRALAX) packet 17 g  17 g Oral BID PRN    pregabalin (LYRICA) capsule 75 mg  75 mg Oral TID    sodium chloride 3 % inhalation solution 4 mL  4 mL Nebulization BID    tacrolimus (PROGRAF) capsule 3 mg  3 mg Oral BID    traZODone (DESYREL) tablet 25 mg  25 mg Oral HS    valACYclovir (VALTREX) tablet 500 mg  500 mg Oral Q24H   , PTA meds:   Prior to Admission Medications   Prescriptions Last Dose Informant Patient Reported? Taking?    ALPRAZolam (XANAX) 1 mg tablet   No No   Sig: Take 1 tablet (1 mg total) by mouth 2 (two) times a day   Respiratory Therapy Supplies (Nebulizer) SUKHI   Yes No   Sig: Please dispense nebulizer machine   SYRINGE-NEEDLE, DISP, 3 ML 23G X 1" 3 ML MISC   Yes No   Sig: Use twice daily to draw up acetylcysteine   acetaminophen (TYLENOL) 325 mg tablet   No No   Sig: Take 3 tablets (975 mg total) by mouth every 8 (eight) hours   acetylcysteine (MUCOMYST) 200 mg/mL nebulizer solution   No No   Sig: Take 2 mL (400 mg total) by nebulization 2 (two) times a day   albuterol (2 5 mg/3 mL) 0 083 % nebulizer solution   No No   Sig: Take 6 mL (5 mg total) by nebulization 2 (two) times a day   apixaban (ELIQUIS) 5 mg   No No   Sig: Take 1 tablet (5 mg total) by mouth 2 (two) times a day   atovaquone (MEPRON) 750 mg/5 mL suspension  Self Yes No   Sig: Take 1,500 mg by mouth daily   capsicum (ZOSTRIX) 0 075 % topical cream   No No   Sig: Apply topically 3 (three) times a day   dronabinol (MARINOL) 5 MG capsule   No No   Sig: TAKE 1 CAPSULE (5 MG TOTAL) BY MOUTH 3 (THREE) TIMES A DAY   famotidine (PEPCID) 20 mg tablet   Yes No   Sig: Take 20 mg by mouth 2 (two) times a day   insulin lispro (HumaLOG) 100 units/mL injection   No No   Sig: Inject 4 Units under the skin 3 (three) times a day with meals   lidocaine (LIDODERM) 5 %   No No   Sig: Apply 2 patches topically daily Remove & Discard patch within 12 hours or as directed by MD   oxyCODONE (ROXICODONE) 10 MG TABS   Yes No   Sig: Take 10 mg by mouth   pancrelipase, Lip-Prot-Amyl, (CREON) 24,000 units  Self Yes No   Sig: Take 48,000 Units by mouth 3 (three) times a day with meals   pantoprazole (PROTONIX) 40 mg tablet   No No   Sig: Take 1 tablet (40 mg total) by mouth daily in the early morning   polyethylene glycol (MIRALAX) 17 g packet  Self Yes No   Sig: Take 17 g by mouth 2 (two) times a day as needed   predniSONE 10 mg tablet   Yes No Sig: Take 10 mg by mouth daily   pregabalin (LYRICA) 75 mg capsule   Yes No   Sig: Take 150 mg by mouth Three times a day     sodium chloride 3 % inhalation solution   Yes No   Sig: Inhale 4 mL 2 (two) times a day   tacrolimus (PROGRAF) 1 mg capsule   Yes No   Sig: Take 3 mg by mouth 2 (two) times a day   traZODone (DESYREL) 50 mg tablet   No No   Sig: Take 0 5 tablets (25 mg total) by mouth daily at bedtime   valACYclovir (VALTREX) 500 mg tablet   Yes No   Sig: Take 500 mg by mouth daily      Facility-Administered Medications: None    and     Allergies   Allergen Reactions    Vancomycin Angioedema and Hives    Ceftazidime Headache     Severe headaches after desensitization       Objective   Vitals:Blood pressure 165/88, pulse 77, temperature 98 °F (36 7 °C), resp  rate 14, height 5' 2" (1 575 m), weight 43 5 kg (96 lb), SpO2 96 %, not currently breastfeeding  ,Body mass index is 17 56 kg/m²  Intake/Output Summary (Last 24 hours) at 1/10/2022 1756  Last data filed at 1/10/2022 1609  Gross per 24 hour   Intake 540 ml   Output 5800 ml   Net -5260 ml       Invasive Devices: Invasive Devices  Report    Central Venous Catheter Line            CVC Central Lines 01/10/22 Double Right Internal jugular <1 day          Peripheral Intravenous Line            Peripheral IV 01/07/22 Left Forearm 3 days          Drain            Ureteral Drain/Stent Right ureter 6 Fr  706 days              Physical Exam  Vitals and nursing note reviewed  Constitutional:       General: She is not in acute distress  Appearance: Normal appearance  She is normal weight  She is not ill-appearing, toxic-appearing or diaphoretic  HENT:      Head: Normocephalic and atraumatic  Eyes:      General: No scleral icterus  Right eye: No discharge  Left eye: No discharge        Extraocular Movements: EOM normal       Conjunctiva/sclera: Conjunctivae normal    Musculoskeletal:      Cervical back: Normal range of motion and neck supple  Comments: Atrophy of right lower extremity noted  Tenderness to palpation of right lower extremity   Skin:     General: Skin is warm and dry  Coloration: Skin is not jaundiced or pale  Findings: No bruising, erythema, lesion or rash  Neurological:      Mental Status: She is alert  Psychiatric:         Mood and Affect: Mood normal          Behavior: Behavior normal          Thought Content: Thought content normal          Judgment: Judgment normal        Neurologic Exam     Mental Status   Patient is alert, lying in bed  Oriented x3  No dysarthria or aphasia noted  Able to follow central and appendicular commands, answers all questions appropriately  Cranial Nerves     CN II   Visual fields full to confrontation  CN III, IV, VI   Extraocular motions are normal    Nystagmus: none   Upgaze: normal  Downgaze: normal  Conjugate gaze: present    CN VII   Facial expression full, symmetric  CN VIII   Hearing: intact    Motor Exam   Overall muscle tone: normal  Right arm pronator drift: absent  Left arm pronator drift: absent  Atrophy noted in RLE from the knee distally    Bilateral upper and lower extremity strength testing 5/5 throughout except as noted:  Right hamstrong mild weakness, 5-/5  Right dorsiflexion and plantar flexion weakness, 3+/5     Sensory Exam   Light touch normal    Patchy sensory deficits to pinprick in right lower extremity, otherwise intact throughout     Gait, Coordination, and Reflexes     Tremor   Resting tremor: absent    Reflexes   Right plantar: equivocal  Left plantar: normal  Bilateral upper limb and lower extremity reflexes WNL  No involuntary movements or seizure-like activity noted throughout exam  Gait testing deferred due to fall risk     Lab Results: I have personally reviewed pertinent reports    Recent Results (from the past 24 hour(s))   Fingerstick Glucose (POCT)    Collection Time: 01/09/22  9:03 PM   Result Value Ref Range    POC Glucose 335 (H) 65 - 140 mg/dl   Hemoglobin and hematocrit, blood    Collection Time: 01/09/22  9:06 PM   Result Value Ref Range    Hemoglobin 8 9 (L) 11 5 - 15 4 g/dL    Hematocrit 29 9 (L) 34 8 - 46 1 %   Fingerstick Glucose (POCT)    Collection Time: 01/10/22 12:42 AM   Result Value Ref Range    POC Glucose 256 (H) 65 - 140 mg/dl   Prepare Leukoreduced RBC: 1 Units    Collection Time: 01/10/22  4:10 AM   Result Value Ref Range    Unit Product Code P0938F78     Unit Number [id number]     Unit ABO A     Unit [facility] NEG     Crossmatch Compatible     Unit Dispense Status Presumed Trans     Unit Product Volume 350 mL   CBC and differential    Collection Time: 01/10/22  4:32 AM   Result Value Ref Range    WBC 3 85 (L) 4 31 - 10 16 Thousand/uL    RBC 3 78 (L) 3 81 - 5 12 Million/uL    Hemoglobin 9 2 (L) 11 5 - 15 4 g/dL    Hematocrit 31 0 (L) 34 8 - 46 1 %    MCV 82 82 - 98 fL    MCH 24 3 (L) 26 8 - 34 3 pg    MCHC 29 7 (L) 31 4 - 37 4 g/dL    RDW 16 5 (H) 11 6 - 15 1 %    MPV 10 5 8 9 - 12 7 fL    Platelets 266 317 - 326 Thousands/uL    nRBC 0 /100 WBCs    Neutrophils Relative 51 43 - 75 %    Immat GRANS % 1 0 - 2 %    Lymphocytes Relative 41 14 - 44 %    Monocytes Relative 7 4 - 12 %    Eosinophils Relative 0 0 - 6 %    Basophils Relative 0 0 - 1 %    Neutrophils Absolute 2 00 1 85 - 7 62 Thousands/µL    Immature Grans Absolute 0 03 0 00 - 0 20 Thousand/uL    Lymphocytes Absolute 1 56 0 60 - 4 47 Thousands/µL    Monocytes Absolute 0 25 0 17 - 1 22 Thousand/µL    Eosinophils Absolute 0 00 0 00 - 0 61 Thousand/µL    Basophils Absolute 0 01 0 00 - 0 10 Thousands/µL   Comprehensive metabolic panel    Collection Time: 01/10/22  4:32 AM   Result Value Ref Range    Sodium 138 136 - 145 mmol/L    Potassium 4 0 3 5 - 5 3 mmol/L    Chloride 102 100 - 108 mmol/L    CO2 27 21 - 32 mmol/L    ANION GAP 9 4 - 13 mmol/L    BUN 19 5 - 25 mg/dL    Creatinine 1 06 0 60 - 1 30 mg/dL    Glucose 250 (H) 65 - 140 mg/dL    Calcium 8 6 8 3 - 10 1 mg/dL Corrected Calcium 9 8 8 3 - 10 1 mg/dL    AST 21 5 - 45 U/L    ALT 48 12 - 78 U/L    Alkaline Phosphatase 189 (H) 46 - 116 U/L    Total Protein 7 1 6 4 - 8 2 g/dL    Albumin 2 5 (L) 3 5 - 5 0 g/dL    Total Bilirubin 0 26 0 20 - 1 00 mg/dL    eGFR 64 ml/min/1 73sq m   CK (with reflex to MB)    Collection Time: 01/10/22  4:32 AM   Result Value Ref Range    Total CK 34 26 - 192 U/L   Fingerstick Glucose (POCT)    Collection Time: 01/10/22  5:50 AM   Result Value Ref Range    POC Glucose 252 (H) 65 - 140 mg/dl   Fingerstick Glucose (POCT)    Collection Time: 01/10/22 10:43 AM   Result Value Ref Range    POC Glucose 177 (H) 65 - 140 mg/dl   Fingerstick Glucose (POCT)    Collection Time: 01/10/22  5:10 PM   Result Value Ref Range    POC Glucose 298 (H) 65 - 140 mg/dl   ]  Imaging Studies: I have personally reviewed pertinent reports and I have personally reviewed pertinent films in PACS  EKG, Pathology, and Other Studies: I have personally reviewed pertinent reports  VTE Prophylaxis:  Eliquis    Dictation voice to text software has been used in the creation of this document  Please consider this in light of any contextual or grammatical errors

## 2022-01-11 LAB
ANION GAP SERPL CALCULATED.3IONS-SCNC: 8 MMOL/L (ref 4–13)
BACTERIA BLD CULT: ABNORMAL
BUN SERPL-MCNC: 24 MG/DL (ref 5–25)
CALCIUM SERPL-MCNC: 8.7 MG/DL (ref 8.3–10.1)
CHLORIDE SERPL-SCNC: 101 MMOL/L (ref 100–108)
CO2 SERPL-SCNC: 30 MMOL/L (ref 21–32)
CREAT SERPL-MCNC: 0.9 MG/DL (ref 0.6–1.3)
ERYTHROCYTE [DISTWIDTH] IN BLOOD BY AUTOMATED COUNT: 16.5 % (ref 11.6–15.1)
GFR SERPL CREATININE-BSD FRML MDRD: 78 ML/MIN/1.73SQ M
GLUCOSE SERPL-MCNC: 191 MG/DL (ref 65–140)
GLUCOSE SERPL-MCNC: 206 MG/DL (ref 65–140)
GLUCOSE SERPL-MCNC: 232 MG/DL (ref 65–140)
GLUCOSE SERPL-MCNC: 375 MG/DL (ref 65–140)
GLUCOSE SERPL-MCNC: 388 MG/DL (ref 65–140)
GRAM STN SPEC: ABNORMAL
GRAM STN SPEC: ABNORMAL
HCT VFR BLD AUTO: 31.8 % (ref 34.8–46.1)
HGB BLD-MCNC: 9.5 G/DL (ref 11.5–15.4)
MCH RBC QN AUTO: 24.4 PG (ref 26.8–34.3)
MCHC RBC AUTO-ENTMCNC: 29.9 G/DL (ref 31.4–37.4)
MCV RBC AUTO: 82 FL (ref 82–98)
PLATELET # BLD AUTO: 201 THOUSANDS/UL (ref 149–390)
PMV BLD AUTO: 11 FL (ref 8.9–12.7)
POTASSIUM SERPL-SCNC: 3.6 MMOL/L (ref 3.5–5.3)
RBC # BLD AUTO: 3.89 MILLION/UL (ref 3.81–5.12)
SODIUM SERPL-SCNC: 139 MMOL/L (ref 136–145)
WBC # BLD AUTO: 4.54 THOUSAND/UL (ref 4.31–10.16)

## 2022-01-11 PROCEDURE — 94760 N-INVAS EAR/PLS OXIMETRY 1: CPT

## 2022-01-11 PROCEDURE — 99233 SBSQ HOSP IP/OBS HIGH 50: CPT | Performed by: FAMILY MEDICINE

## 2022-01-11 PROCEDURE — 99232 SBSQ HOSP IP/OBS MODERATE 35: CPT | Performed by: PSYCHIATRY & NEUROLOGY

## 2022-01-11 PROCEDURE — 94640 AIRWAY INHALATION TREATMENT: CPT

## 2022-01-11 PROCEDURE — 99232 SBSQ HOSP IP/OBS MODERATE 35: CPT | Performed by: INTERNAL MEDICINE

## 2022-01-11 PROCEDURE — 80048 BASIC METABOLIC PNL TOTAL CA: CPT | Performed by: INTERNAL MEDICINE

## 2022-01-11 PROCEDURE — 82948 REAGENT STRIP/BLOOD GLUCOSE: CPT

## 2022-01-11 PROCEDURE — 85027 COMPLETE CBC AUTOMATED: CPT | Performed by: INTERNAL MEDICINE

## 2022-01-11 RX ORDER — OXCARBAZEPINE 150 MG/1
300 TABLET, FILM COATED ORAL EVERY 12 HOURS SCHEDULED
Status: DISCONTINUED | OUTPATIENT
Start: 2022-01-11 | End: 2022-01-14 | Stop reason: HOSPADM

## 2022-01-11 RX ORDER — PREGABALIN 75 MG/1
75 CAPSULE ORAL
Status: DISCONTINUED | OUTPATIENT
Start: 2022-01-11 | End: 2022-01-12

## 2022-01-11 RX ADMIN — OXCARBAZEPINE 300 MG: 150 TABLET, FILM COATED ORAL at 21:41

## 2022-01-11 RX ADMIN — OXYCODONE HYDROCHLORIDE 10 MG: 10 TABLET ORAL at 19:27

## 2022-01-11 RX ADMIN — ATOVAQUONE 1500 MG: 750 SUSPENSION ORAL at 08:20

## 2022-01-11 RX ADMIN — APIXABAN 5 MG: 5 TABLET, FILM COATED ORAL at 17:29

## 2022-01-11 RX ADMIN — ALTEPLASE 2 MG: 2.2 INJECTION, POWDER, LYOPHILIZED, FOR SOLUTION INTRAVENOUS at 11:45

## 2022-01-11 RX ADMIN — TACROLIMUS 3 MG: 0.5 CAPSULE ORAL at 08:17

## 2022-01-11 RX ADMIN — PREGABALIN 75 MG: 75 CAPSULE ORAL at 08:16

## 2022-01-11 RX ADMIN — TACROLIMUS 3 MG: 0.5 CAPSULE ORAL at 23:18

## 2022-01-11 RX ADMIN — HYDROCORTISONE SODIUM SUCCINATE 50 MG: 100 INJECTION, POWDER, FOR SOLUTION INTRAMUSCULAR; INTRAVENOUS at 21:41

## 2022-01-11 RX ADMIN — VALACYCLOVIR HYDROCHLORIDE 500 MG: 500 TABLET, FILM COATED ORAL at 12:35

## 2022-01-11 RX ADMIN — SODIUM CHLORIDE SOLN NEBU 3% 4 ML: 3 NEBU SOLN at 09:19

## 2022-01-11 RX ADMIN — PANCRELIPASE 48000 UNITS: 24000; 76000; 120000 CAPSULE, DELAYED RELEASE PELLETS ORAL at 11:39

## 2022-01-11 RX ADMIN — CEFAZOLIN SODIUM 2000 MG: 2 SOLUTION INTRAVENOUS at 04:48

## 2022-01-11 RX ADMIN — OXYCODONE HYDROCHLORIDE 10 MG: 10 TABLET ORAL at 05:19

## 2022-01-11 RX ADMIN — OXCARBAZEPINE 150 MG: 150 TABLET, FILM COATED ORAL at 08:16

## 2022-01-11 RX ADMIN — OXYCODONE HYDROCHLORIDE 10 MG: 10 TABLET ORAL at 10:57

## 2022-01-11 RX ADMIN — PANCRELIPASE 48000 UNITS: 24000; 76000; 120000 CAPSULE, DELAYED RELEASE PELLETS ORAL at 08:17

## 2022-01-11 RX ADMIN — PANTOPRAZOLE SODIUM 40 MG: 40 TABLET, DELAYED RELEASE ORAL at 05:10

## 2022-01-11 RX ADMIN — ALUMINUM HYDROXIDE, MAGNESIUM HYDROXIDE, AND SIMETHICONE 30 ML: 200; 200; 20 SUSPENSION ORAL at 07:48

## 2022-01-11 RX ADMIN — PANCRELIPASE 48000 UNITS: 24000; 76000; 120000 CAPSULE, DELAYED RELEASE PELLETS ORAL at 17:29

## 2022-01-11 RX ADMIN — DAPTOMYCIN 350 MG: 500 INJECTION, POWDER, LYOPHILIZED, FOR SOLUTION INTRAVENOUS at 12:36

## 2022-01-11 RX ADMIN — ACETAMINOPHEN 650 MG: 325 TABLET, FILM COATED ORAL at 22:05

## 2022-01-11 RX ADMIN — HYDROCORTISONE SODIUM SUCCINATE 50 MG: 100 INJECTION, POWDER, FOR SOLUTION INTRAMUSCULAR; INTRAVENOUS at 05:09

## 2022-01-11 RX ADMIN — DIPHENHYDRAMINE HYDROCHLORIDE 25 MG: 50 INJECTION, SOLUTION INTRAMUSCULAR; INTRAVENOUS at 12:36

## 2022-01-11 RX ADMIN — PREGABALIN 75 MG: 75 CAPSULE ORAL at 21:41

## 2022-01-11 RX ADMIN — HYDROXYZINE HYDROCHLORIDE 50 MG: 25 TABLET ORAL at 23:18

## 2022-01-11 RX ADMIN — HYDROCORTISONE SODIUM SUCCINATE 50 MG: 100 INJECTION, POWDER, FOR SOLUTION INTRAMUSCULAR; INTRAVENOUS at 13:48

## 2022-01-11 RX ADMIN — LIDOCAINE 5% 2 PATCH: 700 PATCH TOPICAL at 08:20

## 2022-01-11 RX ADMIN — ACETAMINOPHEN 650 MG: 325 TABLET, FILM COATED ORAL at 11:54

## 2022-01-11 RX ADMIN — APIXABAN 5 MG: 5 TABLET, FILM COATED ORAL at 08:16

## 2022-01-11 NOTE — PROGRESS NOTES
Progress Note - Neurology   Leonela Roman 42 y o  female [id number]  Unit/Bed#: /-01    Assessment:    Neuropathic pain of right foot  Assessment & Plan  Jeni Dasilva Old is a 42 y o  female with cystic fibrosis s/p lung transplant in 2013 and 2017, recent right pneumonectomy (mid October 2021), currently on immunosuppressive therapy, DVT on Eliquis who presents to [facility] ED on 01/07/2020 to with fever, found to be septic  Neurology asked to evaluate the patient for neuropathic pain of right foot  Patient reports pain began immediately after pneumonectomy in mid October 2021  Etiology unclear at this time, possible concern for nerve damage  Would recommend follow-up with [facility] for a more clear assessment in regards to the details of the surgery and possible complications resulting in right lower extremity atrophy and neuropathic pain  Plan:  - Decrease Lyrica to 75 mg HS  - Increase Trileptal 300 mg BID  - PT/OT  - Monitor neuro exam; notify with any changes  - Follow up with neurologist at [facility] management and supportive care per primary team  Correction of metabolic or infectious disturbances      DVT (deep venous thrombosis) (HCC)  Assessment & Plan  - On Eliquis, plan to continue    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, with long-term current use of insulin [facility])  Assessment & Plan  Lab Results   Component Value Date    HGBA1C 6 3 (H) 11/08/2021       Recent Labs     01/10/22  0042 01/10/22  0550 01/10/22  1043 01/10/22  1710   POCGLU 256* 252* 177* 298*       Blood Sugar Average: Last 72 hrs:  (P) 251 [id number]     - Medical management per primary team    Lung transplant status, bilateral ([facility] [address] )  Assessment & Plan  - History of lung transplant secondary to cystic fibrosis  - Follows with [facility]    * Gram-positive cocci bacteremia  Assessment & Plan  - Septic on presentation, tachycardia, tachypnea, febrile, hypotensive  - Blood cultures x2 on presentation positive for Staphylococcus schleiferi and staphylococcus pseudintermedius  - Repeat blood cultures x2 01/09/2022 pending  - S/p port removal 01/10/2022  - Considering DEJUAN  - Antibiotic management per primary team     Ashu Ellington will need follow up in in 6 weeks with neuromuscular attending/AP  She will not require outpatient neurological testing  Case and treatment plan reviewed with attending neurologist, Dr Debi Campos  Subjective:   Patient resting in bed  She reports that her right foot pain feels the same  She does note that she was able to sit with her legs crossed which is something that she usually isn't able to do  Past Medical History:   Diagnosis Date    ABPA (allergic bronchopulmonary aspergillosis) ([facility] [address] ) 7/8/2012    Acute kidney injury ([facility] [address] ) 9/8/2017    Acute on chronic respiratory failure ([facility] [address] ) 3/4/2017    Last Assessment & Plan:  Formatting of this note might be different from the original  Recurrent with clinical worsening with enlarging PTX, s/p multiple chest tubes, but new fevers 6/19 and worsening leukocytosis indicating infection as cause of 6/19 decompensation with profound hypoxia in setting of shunting through right lung and continued pneumothorax s/p 2nd chest tube placement  6/27: Right    Asthma     Chronic pain     Cystic fibrosis ([address] )     Diabetes mellitus ([address] )     GERD (gastroesophageal reflux disease)     HBP (high blood pressure)     HCAP (healthcare-associated pneumonia) 3/6/2020    Influenza B 2/23/2020    Kidney stone     Neutropenic fever ([facility] [address] ) 9/26/2020    PONV (postoperative nausea and vomiting)     Pseudomonas aeruginosa infection 9/20/2012    Transplant recipient     Lung- 2013, 2017     Past Surgical History:   Procedure Laterality Date    APPENDECTOMY      BREAST SURGERY      Aug      BRONCHOSCOPY      CHOLECYSTECTOMY      COLONOSCOPY      EGD  07/2019    Yeast Infect       FL RETROGRADE PYELOGRAM  2020    FL RETROGRADE PYELOGRAM  2020    GASTROSTOMY TUBE, PLACE      IR PORT REMOVAL  1/10/2022    LUNG SURGERY      LUNG TRANSPLANT      X2    MI CYSTO/URETERO W/LITHOTRIPSY &INDWELL STENT INSRT Right 2020    Procedure: CYSTOSCOPY URETEROSCOPY WITH LITHOTRIPSY HOLMIUM LASER, RETROGRADE PYELOGRAM AND INSERTION STENT URETERAL;  Surgeon: Damian Murray MD;  Location: [facility];  Service: Urology    MI CYSTOURETHROSCOPY,URETER CATHETER Right 2020    Procedure: CYSTOSCOPY RETROGRADE PYELOGRAM WITH INSERTION STENT URETERAL;  Surgeon: Damian Murray MD;  Location: [facility];  Service: Urology   [address] [facility]  2017     Family History   Problem Relation Age of Onset    Alcohol abuse Mother     Mental illness Mother     Alcohol abuse Father     COPD Maternal Grandmother     COPD Maternal Grandfather     COPD Paternal Grandmother     Breast cancer Neg Hx     Ovarian cancer Neg Hx     Colon cancer Neg Hx      Social History     Socioeconomic History    Marital status: /Civil Union     Spouse name: None    Number of children: None    Years of education: 15    Highest education level: None   Occupational History    Occupation: Disabled    Tobacco Use    Smoking status: Former Smoker     Packs/day: 0 50     Years: 12 00     Pack years: 6 00    Smokeless tobacco: Never Used   Vaping Use    Vaping Use: Never used   Substance and Sexual Activity    Alcohol use: Yes     Comment: social    Drug use: Yes     Frequency: 2 0 times per week     Types: Marijuana     Comment: medical edible marijuana prescribed    Sexual activity: Yes     Partners: Male     Birth control/protection: None   Other Topics Concern    None   Social History Narrative    Most recent tobacco use screenin2019    Do you currently or have you served in the [employer]: No    Were you activated, into active duty, as a member of the [employer] or as a Reservist: No    Occupation: disabled    Education: 12    Marital status:     Exercise level: Occasional    Diet: Specific    low sugar, high fat    General stress level: High    Alcohol intake: Occasional    Caffeine intake: Heavy    Chewing tobacco: none    Illicit drugs: none    Guns present in home: No    Seat belts used routinely: Yes    Smoke alarm in home: Yes    Advance directive: Yes     Social Determinants of Health     Financial Resource Strain: Not on file   Food Insecurity: Not on file   Transportation Needs: No Transportation Needs    Lack of Transportation (Medical): No    Lack of Transportation (Non-Medical): No   Physical Activity: Not on file   Stress: Not on file   Social Connections: Not on file   Intimate Partner Violence: Not on file   Housing Stability: Not on file         Medications:   All current active meds have been reviewed and current meds:  Scheduled Meds:  Current Facility-Administered Medications   Medication Dose Route Frequency Provider Last Rate    acetaminophen  650 mg Oral Q4H PRN Jovita Hoskins MD      ALPRAZolam  1 mg Oral BID Jovita Hoskins MD      aluminum-magnesium hydroxide-simethicone  30 mL Oral Q4H PRN Claudette Heller, CRNP      apixaban  5 mg Oral BID Jovita Hoskins MD      atovaquone  1,500 mg Oral Daily Jovita Hoskins MD      DAPTOmycin  8 mg/kg Intravenous Q24H Judit Foy  mg (01/11/22 1236)    diphenhydrAMINE  25 mg Intravenous Q6H PRN Stephanie Gunderson DO      hydrocortisone sodium succinate  50 mg Intravenous [facility] Jovita Hoskins MD      hydrOXYzine HCL  50 mg Oral Q6H PRN Claudette Heller, CRNP      insulin lispro  1-5 Units Subcutaneous 4x Daily (AC & HS) Claudette Heller, CRNP      lidocaine  2 patch Topical Daily Jovita Hoskins MD      OXcarbazepine  300 mg Oral Q12H [facility] An Scott FreezeWAYNE      oxyCODONE  10 mg Oral Q4H PRN Stephanie Gunderson,       oxyCODONE  5 mg Oral Q6H PRN Stephanie Gee DO Neptali      pancrelipase (Lip-Prot-Amyl)  48,000 Units Oral TID With Meals Lenora Gunderson DO      pantoprazole  40 mg Oral Early Morning Emeka Tinoco MD      polyethylene glycol  17 g Oral BID PRN Emeka Tinoco MD      pregabalin  75 mg Oral HS WAYNE Yang      sodium chloride  4 mL Nebulization BID Lenora Gunderson DO      tacrolimus  3 mg Oral BID Emeka Tinoco MD      traZODone  25 mg Oral HS Emeka Tinoco MD      valACYclovir  500 mg Oral Q24H Francia Peck MD       Continuous Infusions:   PRN Meds:   acetaminophen    aluminum-magnesium hydroxide-simethicone    diphenhydrAMINE    hydrOXYzine HCL    oxyCODONE    oxyCODONE    polyethylene glycol       ROS:   Review of Systems   Constitutional: Negative for fever  HENT: Negative for trouble swallowing  Eyes: Negative for photophobia and visual disturbance  Respiratory: Negative for shortness of breath  Cardiovascular: Negative for chest pain  Gastrointestinal: Negative for abdominal pain  Musculoskeletal: Negative for arthralgias, back pain, myalgias and neck pain  Neuropathic pain in right LE   Skin: Negative for rash  Neurological: Positive for weakness and numbness  Negative for dizziness, tremors, seizures, syncope, facial asymmetry, speech difficulty, light-headedness and headaches  Psychiatric/Behavioral: Negative for confusion  All other systems reviewed and are negative  Vitals:   /81   Pulse 69   Temp 97 9 °F (36 6 °C)   Resp 18   Ht 5' 2" (1 575 m)   Wt 43 5 kg (96 lb)   SpO2 97%   BMI 17 56 kg/m²       Physical Exam:   Physical Exam  Vitals and nursing note reviewed  Constitutional:       General: She is not in acute distress  Appearance: She is not ill-appearing or diaphoretic  HENT:      Head: Normocephalic  Mouth/Throat:      Mouth: Mucous membranes are moist       Pharynx: Oropharynx is clear  Eyes:      General: No scleral icterus  Right eye: No discharge  Left eye: No discharge  Extraocular Movements: Extraocular movements intact and EOM normal       Conjunctiva/sclera: Conjunctivae normal    Cardiovascular:      Rate and Rhythm: Normal rate  Musculoskeletal:         General: Tenderness (right foot to palpation) present  Normal range of motion  Cervical back: Normal range of motion  Skin:     General: Skin is warm and dry  Coloration: Skin is not jaundiced or pale  Neurological:      Mental Status: She is alert and oriented to person, place, and time  Deep Tendon Reflexes:      Reflex Scores:       Bicep reflexes are 2+ on the right side and 2+ on the left side  Brachioradialis reflexes are 2+ on the right side and 2+ on the left side  Patellar reflexes are 3+ on the right side and 3+ on the left side  Psychiatric:         Mood and Affect: Mood normal          Behavior: Behavior normal          Thought Content: Thought content normal        Neurologic Exam     Mental Status   Oriented to person, place, and time  Attention: normal  Concentration: normal    Level of consciousness: alert  Able to follow commands appropriately  No dysarthria noted  Cranial Nerves     CN II   Right visual field deficit: none  Left visual field deficit: none     CN III, IV, VI   Extraocular motions are normal      CN V   Facial sensation intact  CN VII   Facial expression full, symmetric  CN VIII   Hearing: intact    CN IX, X   Palate: symmetric    CN XI   CN XI normal      CN XII   CN XII normal      Motor Exam   Muscle bulk: Decreased bulk in right LE with atrophy  Bilateral UE/LE strength 5/5 except as noted:  Right hip flexion 5-/5  Right dorsiflexion 3+/5, plantar flexion 3+/5     Sensory Exam   Light touch normal    Right leg light touch: Increased sensitivity to light touch in right toes    Right leg vibration: normal  Left leg vibration: normal  Right leg pinprick: decreased from knee (If left %, right LE 40-50%)  Left leg pinprick: normal    Gait, Coordination, and Reflexes     Tremor   Resting tremor: absent  Intention tremor: absent    Reflexes   Right brachioradialis: 2+  Left brachioradialis: 2+  Right biceps: 2+  Left biceps: 2+  Right patellar: 3+  Left patellar: 3+  Right plantar: equivocal  Left plantar: equivocal          Labs: I have personally reviewed pertinent reports     Recent Results (from the past 24 hour(s))   Fingerstick Glucose (POCT)    Collection Time: 01/10/22  5:10 PM   Result Value Ref Range    POC Glucose 298 (H) 65 - 140 mg/dl   Fingerstick Glucose (POCT)    Collection Time: 01/10/22  9:17 PM   Result Value Ref Range    POC Glucose 375 (H) 65 - 140 mg/dl   Fingerstick Glucose (POCT)    Collection Time: 01/10/22 11:41 PM   Result Value Ref Range    POC Glucose 213 (H) 65 - 140 mg/dl   CBC and Platelet    Collection Time: 01/11/22  5:08 AM   Result Value Ref Range    WBC 4 54 4 31 - 10 16 Thousand/uL    RBC 3 89 3 81 - 5 12 Million/uL    Hemoglobin 9 5 (L) 11 5 - 15 4 g/dL    Hematocrit 31 8 (L) 34 8 - 46 1 %    MCV 82 82 - 98 fL    MCH 24 4 (L) 26 8 - 34 3 pg    MCHC 29 9 (L) 31 4 - 37 4 g/dL    RDW 16 5 (H) 11 6 - 15 1 %    Platelets 370 967 - 866 Thousands/uL    MPV 11 0 8 9 - 12 7 fL   Basic metabolic panel    Collection Time: 01/11/22  5:08 AM   Result Value Ref Range    Sodium 139 136 - 145 mmol/L    Potassium 3 6 3 5 - 5 3 mmol/L    Chloride 101 100 - 108 mmol/L    CO2 30 21 - 32 mmol/L    ANION GAP 8 4 - 13 mmol/L    BUN 24 5 - 25 mg/dL    Creatinine 0 90 0 60 - 1 30 mg/dL    Glucose 206 (H) 65 - 140 mg/dL    Calcium 8 7 8 3 - 10 1 mg/dL    eGFR 78 ml/min/1 73sq m   Fingerstick Glucose (POCT)    Collection Time: 01/11/22  6:56 AM   Result Value Ref Range    POC Glucose 191 (H) 65 - 140 mg/dl   Fingerstick Glucose (POCT)    Collection Time: 01/11/22 11:10 AM   Result Value Ref Range    POC Glucose 232 (H) 65 - 140 mg/dl       Imaging: I have personally reviewed pertinent imaging in PACS, and I have personally reviewed PACS reports  EKG, Pathology, and Other Studies: I have personally reviewed pertinent reports  VTE Prophylaxis: Sequential compression device (Venodyne) and Eliquis        Total time spent today 27 minutes  Greater than 50% of total time was spent with the patient and / or family counseling and / or coordination of care  A description of the counseling / coordination of care: Patient seen and evaluated  Case reviewed with attending  Chart thoroughly reviewed including imaging and labs  Coordination of care with RN  Discussion of medications and follow up with patient

## 2022-01-11 NOTE — PROGRESS NOTES
Progress Note - Infectious Disease   Sebas Fortune 42 y o  female MRN: [id number]  Unit/Bed#: -01 Encounter: [id number]    Impression/Recommendations:  1  Sepsis, POA:  Fever, leukopenia in the setting of Staph bacteremia  Temperature curve improving, WBC has normalized  · Antibiotic therapy as below  · Monitor clinical response, temperature curve  · Supportive care per primary service  2  Staph pseudintermedius, Staph schleiferi bacteremia:  Suspected port infection  Presumed zoonotic infection as patient has three pet dogs at home  Port was removed on Jan 10th  Central venous catheter placed given patient's poor venous access  Temperature curve improving  2D echocardiogram limited study with aortic and pulmonic pulmonic valves not well visualized  1/9 repeat blood cx are negative to date  · Follow repeat blood cultures to assess for clearance of bacteremia  · Will defer DEJUAN for now unless fevers rebound or bacteremia persists  · Continue daptomycin, verified with microbiology lab that both Staph species are susceptible to daptomycin  · Discontinue cefazolin IV  · Suggest PICC line insertion if repeat blood cx are negative at 72 hours  · Anticipate 2 week duration of IV antibiotic therapy from time of port removal until Jan 24th  Discussed with case management  · Weekly labs while on daptomycin:  CBC with diff, CMP, CK    3  Cystic fibrosis:  Status post bilateral lung transplant 2013 and left lung retransplant 2017  Immunocompromised patient  Patient has had multiple infections in the past and 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  She follows with [facility] transplant  · Continues chronic immunosuppression with tacrolimus and prednisone  · Continue prophylaxis with atovaquone and Valtrex  · CMV monitoring as outpatient      4  Malnutrition and diabetes mellitus  · Supportive care as per primary     My recommendations were discussed with the patient who verbalized understanding  The ID service will follow      Antibiotics: daptomycin, cefazolin since Jan 8th  Scheduled Meds:  Current Facility-Administered Medications   Medication Dose Route Frequency Provider Last Rate    acetaminophen  650 mg Oral Q4H PRN Cris Murphy MD      ALPRAZolam  1 mg Oral BID Cris Murphy MD      aluminum-magnesium hydroxide-simethicone  30 mL Oral Q4H PRN WAYNE Boyce      apixaban  5 mg Oral BID Cris Murphy MD      atovaquone  1,500 mg Oral Daily Cris Murphy MD      cefazolin  2,000 mg Intravenous Q8H Tammy Abbott MD 2,000 mg (01/11/22 0448)    DAPTOmycin  8 mg/kg Intravenous Q24H Tammy Abbott  mg (01/10/22 1221)    diphenhydrAMINE  25 mg Intravenous Q6H PRN Ramirez Gunderson DO      hydrocortisone sodium succinate  50 mg Intravenous [facility] Cris Murphy MD      hydrOXYzine HCL  50 mg Oral Q6H PRN WAYNE Boyce      insulin lispro  1-5 Units Subcutaneous 4x Daily (AC & HS) WAYNE Boyce      lidocaine  2 patch Topical Daily Cris Murphy MD      OXcarbazepine  150 mg Oral Q12H [address] Tamara Moreno PA-C      oxyCODONE  10 mg Oral Q4H PRN Ramirez Gunderson, DO      oxyCODONE  5 mg Oral Q6H PRN Ramirez Gunderson, DO      pancrelipase (Lip-Prot-Amyl)  48,000 Units Oral TID With Meals Ramirez Gunderson DO      pantoprazole  40 mg Oral Early Morning Cris Murphy MD      polyethylene glycol  17 g Oral BID PRN Cris Murphy MD      pregabalin  75 mg Oral TID Tamara Moreno PA-C      sodium chloride  4 mL Nebulization BID Ramirez Gunderson DO      tacrolimus  3 mg Oral BID Cris Murphy MD      traZODone  25 mg Oral HS Cris Murphy MD      valACYclovir  500 mg Oral Q24H Tammy Abbott MD       Continuous Infusions:   PRN Meds:   acetaminophen    aluminum-magnesium hydroxide-simethicone    diphenhydrAMINE    hydrOXYzine HCL    oxyCODONE   oxyCODONE    polyethylene glycol    Subjective:  Patient reports itching during daptomycin infusion yesterday  She denies fever, chills, vomiting, diarrhea, shortness of breath  No pain is reported  Objective:  Vitals:  Temp:  [97 9 °F (36 6 °C)-98 1 °F (36 7 °C)] 97 9 °F (36 6 °C)  HR:  [69-92] 69  Resp:  [14-18] 18  BP: (126-165)/(81-90) 135/81  SpO2:  [94 %-99 %] 97 %  Temp (24hrs), Av °F (36 7 °C), Min:97 9 °F (36 6 °C), Max:98 1 °F (36 7 °C)  Current: Temperature: 97 9 °F (36 6 °C)  Physical Exam:   General Appearance:  Alert, awake, nontoxic, no acute distress   ENT: Oropharynx moist without lesions   Lungs:   Clear to auscultation bilaterally; respirations unlabored   Heart:  S1, S2, RRR, no murmur   Abdomen:   Soft, non-tender, non-distended   : No Kim catheter present   Extremities: No distal leg edema b/l   Neurologic: AAO to person, surroundings, conversant, fluent speech   Skin: RT IJ CVC intact  RT ACW mediport exit site wound is closed without erythema or drainage  Lidocaine patch noted mid-back  No rash     Labs, Cultures, Imaging, & Other studies:   All pertinent labs, cultures and imaging studies were personally reviewed  Results from last 7 days   Lab Units 22  0508 01/10/22  0432 22  2106 22  0758 22  0538   WBC Thousand/uL 4 54 3 85*  --   --  2 51*   HEMOGLOBIN g/dL 9 5* 9 2* 8 9*   < > 6 9*   PLATELETS Thousands/uL 201 180  --   --  149    < > = values in this interval not displayed       Results from last 7 days   Lab Units 22  0508 01/10/22  0432 01/10/22  0432 22  0538 22  0538 22  1113 22  1113 22  0921 22  0921   SODIUM mmol/L 139  --  138  --  136   < > 133*   < > 140   POTASSIUM mmol/L 3 6   < > 4 0   < > 4 5   < > 4 8   < > 3 8   CHLORIDE mmol/L 101   < > 102   < > 103   < > 99*   < > 103   CO2 mmol/L 30   < > 27   < > 27   < > 25   < > 29   BUN mg/dL 24   < > 19   < > 22   < > 20   < > 22   CREATININE mg/dL 0 90   < > 1 06   < > 1 05   < > 1 18   < > 1 10   EGFR ml/min/1 73sq m 78   < > 64   < > 64   < > 56   < > 61   CALCIUM mg/dL 8 7   < > 8 6   < > 7 8*   < > 7 5*   < > 7 9*   AST U/L  --   --  21  --   --   --  42  --  59*   ALT U/L  --   --  48  --   --    < > 105*   < > 160*   ALK PHOS U/L  --   --  189*  --   --    < > 228*   < > 283*    < > = values in this interval not displayed  Results from last 7 days   Lab Units 01/09/22  0524 01/07/22  0921   BLOOD CULTURE  No Growth at 24 hrs  No Growth at 24 hrs   Staphylococcus schleiferi*  Staphylococcus pseudintermedius*  Staphylococcus schleiferi*  Staphylococcus pseudintermedius*   GRAM STAIN RESULT   --  Gram positive cocci in clusters*  Gram positive cocci in clusters*     Results from last 7 days   Lab Units 01/08/22  1113 01/07/22  0921   PROCALCITONIN ng/ml 0 24 0 21         Results from last 7 days   Lab Units 01/09/22  0538   FERRITIN ng/mL 12

## 2022-01-11 NOTE — UTILIZATION REVIEW
Initial Clinical Review    Admission: Date/Time/Statement:   Admission Orders (From admission, onward)     Ordered        01/07/22 1727  Inpatient Admission  Once                      Orders Placed This Encounter   Procedures    Inpatient Admission     Standing Status:   Standing     Number of Occurrences:   1     Order Specific Question:   Level of Care     Answer:   Med Surg [16]     Order Specific Question:   Estimated length of stay     Answer:   More than 2 Midnights     Order Specific Question:   Certification     Answer:   I certify that inpatient services are medically necessary for this patient for a duration of greater than two midnights  See H&P and MD Progress Notes for additional information about the patient's course of treatment  ED Arrival Information     Expected Arrival Acuity    - 1/7/2022 08:06 Urgent         Means of arrival Escorted by Service Admission type    Ambulance [address] Hospitalist Urgent         Arrival complaint    FLU SYMPTOMS        Chief Complaint   Patient presents with    Flu Symptoms     fever since last night, 103 2 at home  Pt hx of lung transplant        Initial Presentation: 43 yo female to ED from home w/ fever , states last night ot was 106  Generalized weakness , mild cough , assoc chills  On immunosuppressive therapy s/p T pneumonectomy 9/2021 at [facility]   Initially planned to transfer then decision was made to treat w/ IV abx and recommended IV zosyn  Admitted IP status w/ sepsis d/t pneumonia cont IVF , IV zosyn , ID consult , tylenol for fevers , start stress dose steroids and atovaquone  Cont tacrolimus ,pancrealipase     Date: 1/8   Day 2: found tto have gram + bacteremia overnight   Started on daptomycin today , last fever was yest   Cont IVF ,monitor ebc and fever and f/u BC   Check echo , f/u ID consult   Consider port removal pending results of persistent bacteremia        1/8 ID Consult   Gram + bacteremia cont daptomycin , DC zosyn and add ancef   Repeat labs tomorrow , f/u echo may need to consider DEJUAN   Consult IR for port removal and temporary access placement   Cont transplant meds   Pulm Consult   Sepsis w/ bacteremia sec to unclear etiology source unclear   Cont abx and monitor  Cont steroids, mepron , prograf and atrovaquone  Albuterol prn   ED Triage Vitals   Temperature Pulse Respirations Blood Pressure SpO2   22 0811 22 0811 22 0811 22 0811 22 0811   (!) 102 2 °F (39 °C) 102 22 120/65 95 %      Temp Source Heart Rate Source Patient Position - Orthostatic VS BP Location FiO2 (%)   22 1600 22 1600 22 1600 22 1600 --   Oral Monitor Sitting Right arm       Pain Score       22 1727       Med Not Given for Pain - for MAR use only          Wt Readings from Last 1 Encounters:   22 43 5 kg (96 lb)     Additional Vital Signs:      VS   Temp (24hrs), Av 2 °F (37 9 °C), Min:97 4 °F (36 3 °C), Max:103 2 °F (39 6 °C)     Temp:  [97 4 °F (36 3 °C)-103 2 °F (39 6 °C)] 97 4 °F (36 3 °C)  HR:  [71-93] 71  Resp:  [15-26] 17  BP: ()/(45-63) 111/63  SpO2:  [90 %-96 %] 95 %  Body mass index is 17 7 kg/m²       22 2310 98 °F (36 7 °C) 74 17 146/78 17 98 % -- -- None (Room air) Lying   22 -- -- -- -- -- 98 % -- -- None (Room air) --   227 -- -- 18 -- -- -- -- -- -- --   22 18:33:01 98 2 °F (36 8 °C) 76 18 119/75 90 95 % -- -- -- --   22 15:07:23 98 5 °F (36 9 °C) 79 -- 129/86 100 98 % -- -- -- --   22 1443 98 5 °F (36 9 °C) 77 18 129/86 -- 98 % -- -- None (Room air) --   22 1433 -- -- 18 -- -- 98 % -- -- None (Room air) --   22 14:25:36 98 2 °F (36 8 °C) 76 -- 117/76 90 95 % -- -- -- --   22 1158 -- -- -- -- -- -- --          Pertinent Labs/Diagnostic Test Results:    CT chest   No acute inflammatory process identified        Findings suggestive of esophageal reflux and esophagitis    Consider upper endoscopy when clinically appropriate, if not recently performed        Additional nonacute findings, as described, unchanged          1/7 PCXR   Stable postoperative appearance of the right thorax post pneumonectomy  Left lung is clear   1/8 MRI thoracic spine Normal enhanced MRI of the thoracic spine      Postoperative change within the right chest status post pneumonectomy with mildly complex effusion    Results from last 7 days   Lab Units 01/07/22  0921   WBC Thousand/uL 2 94*   HEMOGLOBIN g/dL 8 1*   HEMATOCRIT % 28 9*   PLATELETS Thousands/uL 169   NEUTROS PCT % 65   LYMPHS PCT % 22   MONOS PCT % 11   EOS PCT % 1           Results from last 7 days   Lab Units 01/07/22  0921   SODIUM mmol/L 140   POTASSIUM mmol/L 3 8   CHLORIDE mmol/L 103   CO2 mmol/L 29   BUN mg/dL 22   CREATININE mg/dL 1 10   ANION GAP mmol/L 8   CALCIUM mg/dL 7 9*   ALBUMIN g/dL 2 5*   TOTAL BILIRUBIN mg/dL 0 39   ALK PHOS U/L 283*   ALT U/L 160*   AST U/L 59*   GLUCOSE RANDOM mg/dL 122           Results from last 7 days   Lab Units 01/07/22  0921   INR   1 07            Results from last 7 days   Lab Units 01/08/22  0639 01/08/22  0109   POC GLUCOSE mg/dl 128 162*               Results from last 7 days   Lab Units 01/07/22  0921   LACTIC ACID mmol/L 1 6   PROCALCITONIN ng/ml 0 21         ED Treatment:   Medication Administration from 01/07/2022 0806 to 01/08/2022 0044       Date/Time Order Dose Route Action     01/07/2022 0921 sodium chloride 0 9 % bolus 1,000 mL 1,000 mL Intravenous New Bag     01/07/2022 0925 ondansetron (ZOFRAN) injection 4 mg 4 mg Intravenous Given     01/07/2022 0924 aluminum-magnesium hydroxide-simethicone (MYLANTA) oral suspension 30 mL 30 mL Oral Given     01/07/2022 1105 piperacillin-tazobactam (ZOSYN) 4 5 g in sodium chloride 0 9 % 100 mL IVPB 4 5 g Intravenous New Bag     01/07/2022 1018 ketorolac (TORADOL) injection 15 mg 15 mg Intravenous Given     01/07/2022 1201 magnesium sulfate 2 g/50 mL IVPB (premix) 2 g 2 g Intravenous New Bag     01/07/2022 1103 multi-electrolyte (ISOLYTE-S PH 7 4) bolus 1,000 mL 1,000 mL Intravenous New Bag     01/07/2022 1201 lidocaine (LIDODERM) 5 % patch 1 patch 1 patch Topical Medication Applied     01/07/2022 1317 multi-electrolyte (ISOLYTE-S PH 7 4) bolus 1,000 mL 1,000 mL Intravenous New Bag     01/07/2022 1448 multi-electrolyte (PLASMALYTE-A/ISOLYTE-S PH 7 4) IV solution 125 mL/hr Intravenous New Bag     01/07/2022 1733 piperacillin-tazobactam (ZOSYN) 4 5 g in sodium chloride 0 9 % 100 mL IVPB 4 5 g Intravenous New Bag     01/07/2022 1727 acetaminophen (TYLENOL) tablet 650 mg 650 mg Oral Given     01/07/2022 1727 fentanyl citrate (PF) 100 MCG/2ML 50 mcg 50 mcg Intravenous Given     01/07/2022 2048 pancrelipase (Lip-Prot-Amyl) (CREON) delayed release capsule 24,000 Units 24,000 Units Oral Given     01/07/2022 2049 apixaban (ELIQUIS) tablet 5 mg 5 mg Oral Given     01/07/2022 2049 pregabalin (LYRICA) capsule 150 mg 150 mg Oral Given     01/07/2022 2144 tacrolimus (PROGRAF) capsule 3 mg 3 mg Oral Given     01/07/2022 2144 hydrocortisone sodium succinate (PF) (Solu-CORTEF) injection 50 mg 50 mg Intravenous Given     01/07/2022 2047 morphine injection 2 mg 2 mg Intravenous Given        Past Medical History:   Diagnosis Date    ABPA (allergic bronchopulmonary aspergillosis) ([facility] ) 7/8/2012    Acute kidney injury ([facility] ) 9/8/2017    Acute on chronic respiratory failure ([facility] ) 3/4/2017    Last Assessment & Plan:  Formatting of this note might be different from the original  Recurrent with clinical worsening with enlarging PTX, s/p multiple chest tubes, but new fevers 6/19 and worsening leukocytosis indicating infection as cause of 6/19 decompensation with profound hypoxia in setting of shunting through right lung and continued pneumothorax s/p 2nd chest tube placement    6/27: Right    Asthma     Chronic pain     Cystic fibrosis ([address] )     Diabetes mellitus [facility]     GERD (gastroesophageal reflux disease)     HBP (high blood pressure)     HCAP (healthcare-associated pneumonia) 3/6/2020    Influenza B 2/23/2020    Kidney stone     [facility]rop[facility] fever [facility] ) 9/26/2020    PONV (postoperative nausea and vomiting)     Pseudomonas aeruginosa infection 9/20/2012    Transplant recipient     Lung- 2013, 2017     Present on Admission:   Severe protein-calorie malnutrition ([facility] )   Pancreatic insufficiency   D[facility]deep venous thrombosis) ([facility]   Cystic fibrosis ([facility] )   Chronic kidney disease   Chronic pain disorder   Asthma   Gram-positive cocci bacteremia      Admitting Diagnosis: Hypomagnesemia [E83 42]  Fever, unknown origin [R50 9]  Leukopenia [D72 819]  Hypotension [I95 9]  Cystic fibrosis (HCC) [E84 9]  Fever [R50 9]  FTT (failure to thrive) in adult [R62 7]  Recurrent pneumonia [J18 9]  SIRS (systemic inflammatory response syndrome) ([facility] ) [R65 10]  Immunocompromised state due to drug therapy ([place] [address] ) [W74 678, Z79 899]  Age/Sex: 42 y o  female  Admission Orders:  Scheduled Medications:   acetaminophen  650 mg Oral Q4H PRN Latisha Nickerson MD      ALPRAZolam  1 mg Oral BID Latisha Nickerson MD      aluminum-magnesium hydroxide-simethicone  30 mL Oral Q4H PRN WAYNE Dorman      apixaban  5 mg Oral BID Latisha Nickerson MD      atovaquone  1,500 mg Oral Daily Latisha Nickerson MD      DAPTOmycin  6 mg/kg Intravenous Q24H Sanchez Gunderson DO      hydrocortisone sodium succinate  50 mg Intravenous [facility] Latisha Nickerson MD      hydrOXYzine HCL  50 mg Oral Q6H PRN WAYNE Dorman      insulin lispro  1-5 Units Subcutaneous TID [facility] Latisha Nickerson MD      lidocaine  2 patch Topical Daily Latisha Nickerson MD      multi-electrolyte  100 mL/hr Intravenous Continuous Sanchez Gunderson  mL/hr (01/08/22 0809)    oxyCODONE  5 mg Oral Q6H PRN WAYNE Dorman      pancrelipase (Lip-Prot-Amyl)  24,000 Units Oral TID With Meals MD Lauren Tony pantoprazole  40 mg Oral Early Morning Usama Trammell MD      piperacillin-tazobactam  4 5 g Intravenous Q6H [facility] 4 5 g (01/08/22 0809)    polyethylene glycol  17 g Oral BID PRN Usama Trammell MD      pregabalin  150 mg Oral TID Usama Trammell MD      tacrolimus  3 mg Oral BID Usama Trammell MD      traZODone  25 mg Oral HS Usama Trammell MD            IP CONSULT TO PULMONOLOGY  IP CONSULT TO NUTRITION SERVICES  IP CONSULT TO INFECTIOUS DISEASES  INPATIENT CONSULT TO IR  IP CONSULT TO NEUROLOGY    Network Utilization Review Department  ATTENTION: Please call with any questions or concerns to [phone] and carefully listen to the prompts so that you are directed to the right person  All voicemails are confidential   Benita Das all requests for admission clinical reviews, approved or denied determinations and any other requests to dedicated fax number below belonging to the campus where the patient is receiving treatment   List of dedicated fax numbers for the Facilities:  [address] DENIALS (Administrative/Medical Necessity) [phone]   [address] (Maternity/NICU/Pediatrics) [phone]   [address] [place] [address]  [address] Medical [place] [address] [address] [place] [address] [address] [phone]

## 2022-01-11 NOTE — CASE MANAGEMENT
Case Management Assessment & Discharge Planning Note    Patient name Mat Hall  Location [address] 419/-[id number] MRN [id number]  : 1975 Date 2022       Current Admission Date: 2022  Current Admission Diagnosis:Gram-positive cocci bacteremia   Patient Active Problem List    Diagnosis Date Noted    Neuropathic pain of right foot 01/10/2022    Gram-positive cocci bacteremia 2022    Moderate protein-calorie malnutrition ([facility] ) 2022    Weakness of right lower extremity 2021    Chest pain 2021    Cystic fibrosis ([facility] ) 2021    Essential hypertension 2021    Hypercholesteremia 2021    Chronic respiratory failure with hypoxia [facility] ) 2021    Sepsis ([facility] ) 2021    Fatigue 2021    Long term current use of bisphosphonates 2020    Long term current use of systemic steroids 2020    Osteoporosis 2020    Chronic pain disorder 2020    Asthma 2020    Abnormal CT scan, colitis 01/10/2020    Chronic kidney disease 2019    Hiatal hernia 2019    Acute on chronic anemia 2018    DVT (deep venous thrombosis) [facility] ) 2018    Severe protein-calorie malnutrition ([facility] ) 2018    Cytomegalovirus (CMV) viremia (HCC) 2017    Thrombocytopenia ([facility] ) 2017    Immunosuppression ([facility] ) 2017    CMV (cytomegalovirus infection) status negative 2017    Transplant rejection 2016    Colon polyp 2016    Lung transplant status, bilateral [facility] ) 2013    Anxiety 2012    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, with long-term current use of insulin ([facility] ) 10/28/2010    Pancreatic insufficiency 10/28/2010    Chronic sinusitis 10/28/2010    Esophageal reflux 10/28/2010      LOS (days): 4  Geometric Mean LOS (GMLOS) (days):   Days to GMLOS:     OBJECTIVE:    Risk of Unplanned Readmission Score: 22   Bundled Patient Payment:  (The pt is not a Bundle)     Current admission status: Inpatient       Preferred Pharmacy:   [id number] [place] [address]  [address] [place] [address], [address] [facility]  Phone: [phone] Fax: [phone]    Primary Care Provider: Adin Amaya MD    Primary Insurance: BLUE CROSS  Secondary Insurance: MEDICARE    ASSESSMENT:  [facility] [address] Cindi Enamorado Representative - Spouse   Primary Phone: [phone] (Mobile)                    Obs Notice Signed:  (The pt is not OBS)    Readmission Root Cause  30 Day Readmission:  (The pt is not a 30 day re-admit)    Patient Information  Admitted from[de-identified] Home  Mental Status: Alert  During Assessment patient was accompanied by: Not accompanied during assessment  Assessment information provided by[de-identified] Patient  Primary Caregiver: Self  Support Systems: Spouse/significant other  [place] of Residence: [place] [address] do you live in?: [address] [place] [address] entry access options   Select all that apply : Stairs  Number of steps to enter home : 5  Do the steps have railings?: Yes  Type of Current Residence: [address] [place] [address] home  Upon entering residence, is there a bedroom on the main floor (no further steps)?: Yes  Upon entering residence, is there a bathroom on the main floor (no further steps)?: Yes  In the last 12 months, was there a time when you were not able to pay the mortgage or rent on time?: No  In the last 12 months, how many places have you lived?: 1  In the last 12 months, was there a time when you did not have a steady place to sleep or slept in a shelter (including now)?: No  Homeless/housing insecurity resource given?: N/A  Living Arrangements: Lives w/ Spouse/significant other,Lives w/ Daughter  Is patient a ?: No    Activities of Daily Living Prior to Admission  Functional Status: Independent  Completes ADLs independently?: No  Level of ADL dependence: Assistance  Ambulates independently?: No  Level of ambulatory dependence: Assistance  Does patient use assisted devices?: Yes  Assisted Devices (DME) used: [employer]  Does patient currently own DME?: Yes  What DME does the patient currently own?: [employer]  Does patient have a history of Outpatient Therapy (PT/OT)?: No  Does the patient have a history of Short-Term Rehab?: Yes ([facility])  Does patient have a history of HHC?: Yes ([facility] and would like [facility] [place])  Does patient currently have [facility]?: Yes    Current Home Health Care  Type of Current Home Care Services: Home PT,Home OT,Nurse visit  Current Home Health Agency[de-identified] [address] [place] [address] Provider[de-identified] PCP    Patient Information Continued  Income Source: SSI/SSD  Does patient have prescription coverage?: Yes  Within the past 12 months, you worried that your food would run out before you got the money to buy more : Never true  Within the past 12 months, the food you bought just didnt last and you didnt have money to get more : Never true  Food insecurity resource given?: N/A  Does patient receive dialysis treatments?: No  Does patient have a history of substance abuse?: No  Does patient have a history of Mental Health Diagnosis?: No    PHQ 2/9 Screening   Reviewed PHQ 2/9 Depression Screening Score?: Yes    Means of Transportation  Means of Transport to Appts[de-identified] Family transport  In the past 12 months, has lack of transportation kept you from medical appointments or from getting medications?: No  In the past 12 months, has lack of transportation kept you from meetings, work, or from getting things needed for daily living?: No  Was application for public transport provided?: N/A        DISCHARGE DETAILS:    Discharge planning discussed with[de-identified] pt  Freedom of Choice: Yes  Comments - Freedom of Choice:  The pt states that she would like to [facility] [place] with [facility]     Were Treatment Team discharge recommendations reviewed with patient/caregiver?: Yes  Did patient/caregiver verbalize understanding of patient care needs?: Yes  Were patient/caregiver advised of the risks associated with not following Treatment Team discharge recommendations?: Yes         Requested [address] [facility] [address]         [facility] Yes  [address] requested[de-identified] [address] Name[de-identified] [address] Provider[de-identified] PCP  Home Health Services Needed[de-identified] Gait/ADL Training,Evaluate Functional Status and Safety,Strengthening/Theraputic Exercises to Improve Function  Homebound Criteria Met[de-identified] Uses an Assist Device (i e  cane, walker, etc)  Supporting Clincal Findings[de-identified] Fatigues Easliy in Short Distances,Limited Endurance    DME Referral Provided  Referral made for DME?: No    Other Referral/Resources/Interventions Provided:  Interventions: C  Referral Comments: The pt would like to [facility] with [facility]    Referral pended         Treatment Team Recommendation: Home with [address] [employer]  Discharge Destination Plan[de-identified] Home with [facility] [employer]

## 2022-01-11 NOTE — ASSESSMENT & PLAN NOTE
- neurology following: increasing Trileptal to 300 mg twice a day and tapering Lyrica to 75 mg at bedtime

## 2022-01-11 NOTE — UTILIZATION REVIEW
Inpatient Admission Authorization Request   NOTIFICATION OF INPATIENT ADMISSION/INPATIENT AUTHORIZATION REQUEST   SERVICING FACILITY:   [address] [place] [address]  Tax ID: [id number]  NPI: [id number]  Place of Service: [facility]patient [facility]  Place of Service Code: 24     ATTENDING PROVIDER:  Attending Name and NPI#: Tunde Mijares Md [[id number]]  Address: [address] [place] [address]  Phone: [phone]     UTILIZATION REVIEW CONTACT:  Bree Dubon, Utilization   Network Utilization Review Department  Phone: [phone]  Fax [phone]  Email: [email or web address]     PHYSICIAN ADVISORY SERVICES:  FOR URUN-[id number]-LLBD REVIEW - MEDICAL NECESSITY DENIAL  Phone: [phone]  Fax: [phone]  Email: [email or web address]     TYPE OF REQUEST:  Inpatient Status     ADMISSION INFORMATION:  ADMISSION DATE/TIME: 1/7/22  5:27 PM  PATIENT DIAGNOSIS CODE/DESCRIPTION:  Hypomagnesemia [E83 42]  Fever, unknown origin [R50 9]  Leukopenia [D72 819]  Hypotension [I95 9]  Cystic fibrosis (HCC) [E84 9]  Fever [R50 9]  FTT (failure to thrive) in adult [R62 7]  Recurrent pneumonia [J18 9]  SIRS (systemic inflammatory response syndrome) ([facility] ) [R65 10]  Immunocompromised state due to drug therapy ([facility] ) [W75 429, Z79 899]  DISCHARGE DATE/TIME: No discharge date for patient encounter  DISCHARGE DISPOSITION (IF DISCHARGED): Home/Self Care     IMPORTANT INFORMATION:  Please contact the Bree Dubon directly with any questions or concerns regarding this request  Department voicemails are confidential     Send requests for admission clinical reviews, concurrent reviews, approvals, and administrative denials due to lack of clinical to fax [phone]

## 2022-01-11 NOTE — PLAN OF CARE
Problem: INFECTION - ADULT  Goal: Absence or prevention of progression during hospitalization  Description: INTERVENTIONS:  - Assess and monitor for signs and symptoms of infection  - Monitor lab/diagnostic results  - Monitor all insertion sites, i e  indwelling lines, tubes, and drains  - Monitor endotracheal if appropriate and nasal secretions for changes in amount and color  - [place] appropriate cooling/warming therapies per order  - Administer medications as ordered  - Instruct and encourage patient and family to use good hand hygiene technique  - Identify and instruct in appropriate isolation precautions for identified infection/condition  Outcome: Progressing

## 2022-01-11 NOTE — PROGRESS NOTES
[address] [facility]  Progress Note Mamadou Burns 1975, 42 y o  female MRN: [id number]  Unit/Bed#: -Alexandra Encounter: [id number]  Primary Care Provider: King Sarah MD   Date and time admitted to hospital: 1/7/2022  8:07 AM    * Sepsis [facility])  Assessment & Plan  - repeat Blood cultures: no growth 24 hours, will wait for 72 hrs till a new line is obtained  Pt says she cannot get a PICC line  IR will be consulted for other options  - met criteria on admission with tachycardia, tachypnea, fever, hypotension, and now with Gram-positive bacteremia  - treatment plan as above    Gram-positive cocci bacteremia  Assessment & Plan  - staph pseudintermedius/schleiferi  - 2/2 sets positive for Gram-positive cocci on admission  - source is not entirely clear at this time imaging of the chest and abdomen/pelvis was unrevealing  She has no open skin lesions  No recent trauma  MRI of the thoracic spine where she has had some pain was unrevealing  - status post port removal on 01/10  Catheter tip no growth    - continue IV daptomycin  Will premedicate with benadryl prior to daptomycin due to itching  -  cefazolin DCd per ID recommendations  - follow-up repeat blood cultures drawn 1/9  - port to be replaced at a later date after antibiotic course completed  - monitor WBC count and fever curve  - deferred DEJUAN for now      Neuropathic pain of right foot  Assessment & Plan  - neurology following: increasing Trileptal to 300 mg twice a day and tapering Lyrica to 75 mg at bedtime        Cystic fibrosis ([facility] [address] )  Assessment & Plan  - History noted  - s/p lung transplant    Severe protein-calorie malnutrition ([facility] [address] )  Assessment & Plan  - nutrition evaluation    Chronic kidney disease  Assessment & Plan  - renal function at baseline; continue to monitor    Long term current use of systemic steroids  Assessment & Plan  - on long-term systemic steroids due to history of lung transplant  - currently on IV hydrocortisone given current sepsis/infection    Acute on chronic anemia  Assessment & Plan  - no active signs or symptoms of bleeding  - patient denies any hematochezia, melena, or hematemesis  Denies any hematuria  Has not had menses for few months  - Hb stable at 9 5  - monitor clinically for signs or symptoms of bleeding  - transfuses 1 unit irradiated PRBCs 1/9    DVT (deep venous thrombosis) ([facility])  Assessment & Plan  - stable  - Continue Eliquis    Chronic pain disorder  Assessment & Plan  - Continue home meds    Asthma  Assessment & Plan  - Continue duo nebs as needed    Pancreatic insufficiency  Assessment & Plan  - no abd pain  - history of cystic fibrosis  - Continue pancrelipase    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, with long-term current use of insulin ([facility])  Assessment & Plan  - last hemoglobin A1c on record in November 2021 showed hemoglobin A1c of 6 1%  - monitor on ISS for now    Lung transplant status, bilateral ([facility] [address] )  Assessment & Plan  - history of lung transplant secondary to cystic fibrosis  - follows with [facility]  - history of pneumonia with Pseudomonas    - continue home tacrolimus, stress dose steroids, for now  - continue prophylactic atovaquone and valacyclovir      VTE Pharmacologic Prophylaxis:   eliquis    Patient Centered Rounds: I performed bedside rounds with nursing staff today  Discussions with Specialists or Other Care Team Provider: ID    Education and Discussions with Family / Patient: Attempted to update  () via phone  Left voicemail  Time Spent for Care: 15 minutes  More than 50% of total time spent on counseling and coordination of care as described above      Current Length of Stay: 4 day(s)  Current Patient Status: Inpatient   Certification Statement: The patient will continue to require additional inpatient hospital stay due to need for antibiotics  Discharge Plan: Anticipate discharge in 48-72 hrs to home with home services  Code Status: Level 1 - Full Code    Subjective:   Seen and examiined at bedside  No new complaints except for pruritus with daptomycin  No rash or other reactions indicative for anaphylaxis    Objective:     Vitals:   Temp (24hrs), Av 9 °F (36 6 °C), Min:97 8 °F (36 6 °C), Max:98 1 °F (36 7 °C)    Temp:  [97 8 °F (36 6 °C)-98 1 °F (36 7 °C)] 97 8 °F (36 6 °C)  HR:  [67-77] 67  Resp:  [17-18] 18  BP: (118-139)/(76-90) 118/76  SpO2:  [94 %-99 %] 95 %  Body mass index is 17 56 kg/m²  Input and Output Summary (last 24 hours): Intake/Output Summary (Last 24 hours) at 2022 1647  Last data filed at 2022 0836  Gross per 24 hour   Intake 360 ml   Output 2300 ml   Net -1940 ml       Physical Exam:   Physical Exam s1,2 (+)R  Lungs CTA  No new focal neuro deficits  A&ox3  cooperative    Additional Data:     Labs:  Results from last 7 days   Lab Units 22  0508 01/10/22  0432 01/10/22  0432   WBC Thousand/uL 4 54   < > 3 85*   HEMOGLOBIN g/dL 9 5*   < > 9 2*   HEMATOCRIT % 31 8*   < > 31 0*   PLATELETS Thousands/uL 201   < > 180   NEUTROS PCT %  --   --  51   LYMPHS PCT %  --   --  41   MONOS PCT %  --   --  7   EOS PCT %  --   --  0    < > = values in this interval not displayed  Results from last 7 days   Lab Units 22  0508 01/10/22  0432 01/10/22  0432   SODIUM mmol/L 139   < > 138   POTASSIUM mmol/L 3 6   < > 4 0   CHLORIDE mmol/L 101   < > 102   CO2 mmol/L 30   < > 27   BUN mg/dL 24   < > 19   CREATININE mg/dL 0 90   < > 1 06   ANION GAP mmol/L 8   < > 9   CALCIUM mg/dL 8 7   < > 8 6   ALBUMIN g/dL  --   --  2 5*   TOTAL BILIRUBIN mg/dL  --   --  0 26   ALK PHOS U/L  --   --  189*   ALT U/L  --   --  48   AST U/L  --   --  21   GLUCOSE RANDOM mg/dL 206*   < > 250*    < > = values in this interval not displayed       Results from last 7 days   Lab Units 22  0921   INR  1 07     Results from last 7 days   Lab Units 22  1636 22  1110 22  6507 01/10/22  2341 01/10/22  2117 01/10/22  1710 01/10/22  1043 01/10/22  0550 01/10/22  0042 01/09/22  2103 01/09/22  1551 01/09/22  1059   POC GLUCOSE mg/dl 375* 232* 191* 213* 375* 298* 177* 252* 256* 335* 249* 251*         Results from last 7 days   Lab Units 01/08/22  1113 01/07/22  0921   LACTIC ACID mmol/L  --  1 6   PROCALCITONIN ng/ml 0 24 0 21       Lines/Drains:  Invasive Devices  Report    Central Venous Catheter Line            CVC Central Lines 01/10/22 Double Right Internal jugular 1 day          Drain            Ureteral Drain/Stent Right ureter 6 Fr  707 days                Central Line:  Goal for removal: N/A - Discharging with PICC for IV ABX/medications             Imaging: No pertinent imaging reviewed  Recent Cultures (last 7 days):   Results from last 7 days   Lab Units 01/09/22  0524 01/07/22  0921   BLOOD CULTURE  No Growth at 24 hrs  No Growth at 24 hrs   Staphylococcus schleiferi*  Staphylococcus pseudintermedius*  Staphylococcus schleiferi*  Staphylococcus pseudintermedius*   GRAM STAIN RESULT   --  Gram positive cocci in clusters*  Gram positive cocci in clusters*       Last 24 Hours Medication List:   Current Facility-Administered Medications   Medication Dose Route Frequency Provider Last Rate    acetaminophen  650 mg Oral Q4H PRN Mckenna Bell MD      ALPRAZolam  1 mg Oral BID Mckenna Bell MD      aluminum-magnesium hydroxide-simethicone  30 mL Oral Q4H PRN WAYNE Win      apixaban  5 mg Oral BID Mckenna Bell MD      atovaquone  1,500 mg Oral Daily Mckenna Bell MD      DAPTOmycin  8 mg/kg Intravenous Q24H Adolfo Mcclure  mg (01/11/22 1236)    diphenhydrAMINE  25 mg Intravenous Q6H PRN Harley Gunderson DO      hydrocortisone sodium succinate  50 mg Intravenous [facility] Mckenna Bell MD      hydrOXYzine HCL  50 mg Oral Q6H PRN WAYNE Win      insulin lispro  1-5 Units Subcutaneous 4x Daily (AC & HS) [facility] WAYNE Polanco      lidocaine  2 patch Topical Daily Bernadette Delacruz MD      OXcarbazepine  300 mg Oral Q12H [address] An Venancio BiolaWAYNE      oxyCODONE  10 mg Oral Q4H PRN Jenny Gunderson, DO      oxyCODONE  5 mg Oral Q6H PRN Jenny Iveyinic, DO      pancrelipase (Lip-Prot-Amyl)  48,000 Units Oral TID With Meals Jenny Gunderson, DO      pantoprazole  40 mg Oral Early Morning Bernadette Delacruz MD      polyethylene glycol  17 g Oral BID PRN Bernadette Delacruz MD      pregabalin  75 mg Oral HS Tahmina BoboWAYNE      sodium chloride  4 mL Nebulization BID Jenny Gunderson, DO      tacrolimus  3 mg Oral BID Bernadette Delacruz MD      traZODone  25 mg Oral HS Bernadette Delacruz MD      valACYclovir  500 mg Oral Q24H Katty Hi MD          Today, Patient Was Seen By: Isabel Olivia MD    **Please Note: This note may have been constructed using a voice recognition system  **

## 2022-01-11 NOTE — PLAN OF CARE
Problem: Potential for Falls  Goal: Patient will remain free of falls  Description: INTERVENTIONS:  - Educate patient/family on patient safety including physical limitations  - Instruct patient to call for assistance with activity   - Consult OT/PT to assist with strengthening/mobility   - Keep Call bell within reach  - Keep bed low and locked with side rails adjusted as appropriate  - Keep care items and personal belongings within reach  - Initiate and maintain comfort rounds  - Make Fall Risk Sign visible to staff  - Offer Toileting every 2 Hours, in advance of need  - Initiate/Maintain alarm  - Obtain necessary fall risk management equipment  - Apply yellow socks and bracelet for high fall risk patients  - Consider moving patient to room near nurses station  Outcome: Progressing     Problem: MOBILITY - ADULT  Goal: Maintain or return to baseline ADL function  Description: INTERVENTIONS:  -  Assess patient's ability to carry out ADLs; assess patient's baseline for ADL function and identify physical deficits which impact ability to perform ADLs (bathing, care of mouth/teeth, toileting, grooming, dressing, etc )  - Assess/evaluate cause of self-care deficits   - Assess range of motion  - Assess patient's mobility; develop plan if impaired  - Assess patient's need for assistive devices and provide as appropriate  - Encourage maximum independence but intervene and supervise when necessary  - Involve family in performance of ADLs  - Assess for home care needs following discharge   - Consider OT consult to assist with ADL evaluation and planning for discharge  - Provide patient education as appropriate  Outcome: Progressing  Goal: Maintains/Returns to pre admission functional level  Description: INTERVENTIONS:  - Perform BMAT or MOVE assessment daily    - Set and communicate daily mobility goal to care team and patient/family/caregiver     - Collaborate with rehabilitation services on mobility goals if consulted  - Perform Range of Motion 3 times a day  - Reposition patient every 2 hours    - Dangle patient 3 times a day  - Stand patient 3 times a day  - Ambulate patient 3 times a day  - Out of bed to chair 3 times a day   - Out of bed for meals 3 times a day  - Out of bed for toileting  - Record patient progress and toleration of activity level   Outcome: Progressing     Problem: PAIN - ADULT  Goal: Verbalizes/displays adequate comfort level or baseline comfort level  Description: Interventions:  - Encourage patient to monitor pain and request assistance  - Assess pain using appropriate pain scale  - Administer analgesics based on type and severity of pain and evaluate response  - Implement non-pharmacological measures as appropriate and evaluate response  - Consider cultural and social influences on pain and pain management  - Notify physician/advanced practitioner if interventions unsuccessful or patient reports new pain  Outcome: Progressing     Problem: INFECTION - ADULT  Goal: Absence or prevention of progression during hospitalization  Description: INTERVENTIONS:  - Assess and monitor for signs and symptoms of infection  - Monitor lab/diagnostic results  - Monitor all insertion sites, i e  indwelling lines, tubes, and drains  - Monitor endotracheal if appropriate and nasal secretions for changes in amount and color  - [place] appropriate cooling/warming therapies per order  - Administer medications as ordered  - Instruct and encourage patient and family to use good hand hygiene technique  - Identify and instruct in appropriate isolation precautions for identified infection/condition  Outcome: Progressing  Goal: Absence of fever/infection during neutropenic period  Description: INTERVENTIONS:  - Monitor WBC    Outcome: Progressing     Problem: DISCHARGE PLANNING  Goal: Discharge to home or other facility with appropriate resources  Description: INTERVENTIONS:  - Identify barriers to discharge w/patient and caregiver  - Arrange for needed discharge resources and transportation as appropriate  - Identify discharge learning needs (meds, wound care, etc )  - Arrange for interpretive services to assist at discharge as needed  - Refer to Case Management Department for coordinating discharge planning if the patient needs post-hospital services based on physician/advanced practitioner order or complex needs related to functional status, cognitive ability, or social support system  Outcome: Progressing     Problem: Knowledge Deficit  Goal: Patient/family/caregiver demonstrates understanding of disease process, treatment plan, medications, and discharge instructions  Description: Complete learning assessment and assess knowledge base  Interventions:  - Provide teaching at level of understanding  - Provide teaching via preferred learning methods  Outcome: Progressing     Problem: RESPIRATORY - ADULT  Goal: Achieves optimal ventilation and oxygenation  Description: INTERVENTIONS:  - Assess for changes in respiratory status  - Assess for changes in mentation and behavior  - Position to facilitate oxygenation and minimize respiratory effort  - Oxygen administered by appropriate delivery if ordered  - Initiate smoking cessation education as indicated  - Encourage broncho-pulmonary hygiene including cough, deep breathe, Incentive Spirometry  - Assess the need for suctioning and aspirate as needed  - Assess and instruct to report SOB or any respiratory difficulty  - Respiratory Therapy support as indicated  Outcome: Progressing     Problem: Nutrition/Hydration-ADULT  Goal: Nutrient/Hydration intake appropriate for improving, restoring or maintaining nutritional needs  Description: Monitor and assess patient's nutrition/hydration status for malnutrition  Collaborate with interdisciplinary team and initiate plan and interventions as ordered  Monitor patient's weight and dietary intake as ordered or per policy   Utilize nutrition screening tool and intervene as necessary  Determine patient's food preferences and provide high-protein, high-caloric foods as appropriate       INTERVENTIONS:  - Monitor oral intake, urinary output, labs, and treatment plans  - Assess nutrition and hydration status and recommend course of action  - Evaluate amount of meals eaten  - Assist patient with eating if necessary   - Allow adequate time for meals  - Recommend/ encourage appropriate diets, oral nutritional supplements, and vitamin/mineral supplements  - Order, calculate, and assess calorie counts as needed  - Recommend, monitor, and adjust tube feedings and TPN/PPN based on assessed needs  - Assess need for intravenous fluids  - Provide specific nutrition/hydration education as appropriate  - Include patient/family/caregiver in decisions related to nutrition  Outcome: Progressing

## 2022-01-12 LAB
ANION GAP SERPL CALCULATED.3IONS-SCNC: 8 MMOL/L (ref 4–13)
BASOPHILS # BLD AUTO: 0.01 THOUSANDS/ΜL (ref 0–0.1)
BASOPHILS NFR BLD AUTO: 0 % (ref 0–1)
BUN SERPL-MCNC: 26 MG/DL (ref 5–25)
CALCIUM SERPL-MCNC: 9.3 MG/DL (ref 8.3–10.1)
CHLORIDE SERPL-SCNC: 99 MMOL/L (ref 100–108)
CO2 SERPL-SCNC: 30 MMOL/L (ref 21–32)
CREAT SERPL-MCNC: 0.89 MG/DL (ref 0.6–1.3)
EOSINOPHIL # BLD AUTO: 0.02 THOUSAND/ΜL (ref 0–0.61)
EOSINOPHIL NFR BLD AUTO: 0 % (ref 0–6)
ERYTHROCYTE [DISTWIDTH] IN BLOOD BY AUTOMATED COUNT: 16.7 % (ref 11.6–15.1)
GFR SERPL CREATININE-BSD FRML MDRD: 79 ML/MIN/1.73SQ M
GLUCOSE SERPL-MCNC: 195 MG/DL (ref 65–140)
GLUCOSE SERPL-MCNC: 219 MG/DL (ref 65–140)
GLUCOSE SERPL-MCNC: 220 MG/DL (ref 65–140)
GLUCOSE SERPL-MCNC: 303 MG/DL (ref 65–140)
GLUCOSE SERPL-MCNC: 395 MG/DL (ref 65–140)
GLUCOSE SERPL-MCNC: 403 MG/DL (ref 65–140)
HCT VFR BLD AUTO: 37.8 % (ref 34.8–46.1)
HGB BLD-MCNC: 10.9 G/DL (ref 11.5–15.4)
IMM GRANULOCYTES # BLD AUTO: 0.01 THOUSAND/UL (ref 0–0.2)
IMM GRANULOCYTES NFR BLD AUTO: 0 % (ref 0–2)
LYMPHOCYTES # BLD AUTO: 2.01 THOUSANDS/ΜL (ref 0.6–4.47)
LYMPHOCYTES NFR BLD AUTO: 44 % (ref 14–44)
MCH RBC QN AUTO: 24.2 PG (ref 26.8–34.3)
MCHC RBC AUTO-ENTMCNC: 28.8 G/DL (ref 31.4–37.4)
MCV RBC AUTO: 84 FL (ref 82–98)
MONOCYTES # BLD AUTO: 0.3 THOUSAND/ΜL (ref 0.17–1.22)
MONOCYTES NFR BLD AUTO: 7 % (ref 4–12)
NEUTROPHILS # BLD AUTO: 2.22 THOUSANDS/ΜL (ref 1.85–7.62)
NEUTS SEG NFR BLD AUTO: 49 % (ref 43–75)
NRBC BLD AUTO-RTO: 0 /100 WBCS
PLATELET # BLD AUTO: 223 THOUSANDS/UL (ref 149–390)
PMV BLD AUTO: 10.8 FL (ref 8.9–12.7)
POTASSIUM SERPL-SCNC: 3.9 MMOL/L (ref 3.5–5.3)
RBC # BLD AUTO: 4.51 MILLION/UL (ref 3.81–5.12)
SODIUM SERPL-SCNC: 137 MMOL/L (ref 136–145)
WBC # BLD AUTO: 4.57 THOUSAND/UL (ref 4.31–10.16)

## 2022-01-12 PROCEDURE — 99232 SBSQ HOSP IP/OBS MODERATE 35: CPT | Performed by: INTERNAL MEDICINE

## 2022-01-12 PROCEDURE — 80048 BASIC METABOLIC PNL TOTAL CA: CPT | Performed by: FAMILY MEDICINE

## 2022-01-12 PROCEDURE — 94760 N-INVAS EAR/PLS OXIMETRY 1: CPT

## 2022-01-12 PROCEDURE — 94640 AIRWAY INHALATION TREATMENT: CPT

## 2022-01-12 PROCEDURE — 82948 REAGENT STRIP/BLOOD GLUCOSE: CPT

## 2022-01-12 PROCEDURE — 99233 SBSQ HOSP IP/OBS HIGH 50: CPT | Performed by: PSYCHIATRY & NEUROLOGY

## 2022-01-12 PROCEDURE — 85025 COMPLETE CBC W/AUTO DIFF WBC: CPT | Performed by: FAMILY MEDICINE

## 2022-01-12 RX ORDER — AMOXICILLIN 250 MG
1 CAPSULE ORAL
Status: DISCONTINUED | OUTPATIENT
Start: 2022-01-12 | End: 2022-01-14 | Stop reason: HOSPADM

## 2022-01-12 RX ORDER — POLYETHYLENE GLYCOL 3350 17 G/17G
17 POWDER, FOR SOLUTION ORAL DAILY
Status: DISCONTINUED | OUTPATIENT
Start: 2022-01-12 | End: 2022-01-14 | Stop reason: HOSPADM

## 2022-01-12 RX ORDER — DIPHENHYDRAMINE HYDROCHLORIDE 50 MG/ML
25 INJECTION INTRAMUSCULAR; INTRAVENOUS ONCE
Status: COMPLETED | OUTPATIENT
Start: 2022-01-12 | End: 2022-01-12

## 2022-01-12 RX ADMIN — ALUMINUM HYDROXIDE, MAGNESIUM HYDROXIDE, AND SIMETHICONE 30 ML: 200; 200; 20 SUSPENSION ORAL at 21:47

## 2022-01-12 RX ADMIN — DOCUSATE SODIUM AND SENNOSIDES 1 TABLET: 8.6; 5 TABLET, FILM COATED ORAL at 21:47

## 2022-01-12 RX ADMIN — APIXABAN 5 MG: 5 TABLET, FILM COATED ORAL at 18:08

## 2022-01-12 RX ADMIN — LIDOCAINE 5% 2 PATCH: 700 PATCH TOPICAL at 08:10

## 2022-01-12 RX ADMIN — ATOVAQUONE 1500 MG: 750 SUSPENSION ORAL at 08:11

## 2022-01-12 RX ADMIN — TACROLIMUS 3 MG: 0.5 CAPSULE ORAL at 18:08

## 2022-01-12 RX ADMIN — SODIUM CHLORIDE SOLN NEBU 3% 4 ML: 3 NEBU SOLN at 21:10

## 2022-01-12 RX ADMIN — ACETAMINOPHEN 650 MG: 325 TABLET, FILM COATED ORAL at 12:40

## 2022-01-12 RX ADMIN — PANCRELIPASE 48000 UNITS: 24000; 76000; 120000 CAPSULE, DELAYED RELEASE PELLETS ORAL at 12:23

## 2022-01-12 RX ADMIN — SODIUM CHLORIDE SOLN NEBU 3% 4 ML: 3 NEBU SOLN at 10:38

## 2022-01-12 RX ADMIN — PANTOPRAZOLE SODIUM 40 MG: 40 TABLET, DELAYED RELEASE ORAL at 05:56

## 2022-01-12 RX ADMIN — OXYCODONE HYDROCHLORIDE 10 MG: 10 TABLET ORAL at 21:47

## 2022-01-12 RX ADMIN — OXYCODONE HYDROCHLORIDE 10 MG: 10 TABLET ORAL at 09:13

## 2022-01-12 RX ADMIN — DAPTOMYCIN 350 MG: 500 INJECTION, POWDER, LYOPHILIZED, FOR SOLUTION INTRAVENOUS at 12:22

## 2022-01-12 RX ADMIN — PANCRELIPASE 48000 UNITS: 24000; 76000; 120000 CAPSULE, DELAYED RELEASE PELLETS ORAL at 16:25

## 2022-01-12 RX ADMIN — APIXABAN 5 MG: 5 TABLET, FILM COATED ORAL at 08:09

## 2022-01-12 RX ADMIN — OXCARBAZEPINE 300 MG: 150 TABLET, FILM COATED ORAL at 08:09

## 2022-01-12 RX ADMIN — ALUMINUM HYDROXIDE, MAGNESIUM HYDROXIDE, AND SIMETHICONE 30 ML: 200; 200; 20 SUSPENSION ORAL at 12:40

## 2022-01-12 RX ADMIN — DIPHENHYDRAMINE HYDROCHLORIDE 25 MG: 50 INJECTION, SOLUTION INTRAMUSCULAR; INTRAVENOUS at 03:43

## 2022-01-12 RX ADMIN — ALUMINUM HYDROXIDE, MAGNESIUM HYDROXIDE, AND SIMETHICONE 30 ML: 200; 200; 20 SUSPENSION ORAL at 04:06

## 2022-01-12 RX ADMIN — POLYETHYLENE GLYCOL 3350 17 G: 17 POWDER, FOR SOLUTION ORAL at 11:19

## 2022-01-12 RX ADMIN — DIPHENHYDRAMINE HYDROCHLORIDE 25 MG: 50 INJECTION, SOLUTION INTRAMUSCULAR; INTRAVENOUS at 12:28

## 2022-01-12 RX ADMIN — ACETAMINOPHEN 650 MG: 325 TABLET, FILM COATED ORAL at 18:14

## 2022-01-12 RX ADMIN — DIPHENHYDRAMINE HYDROCHLORIDE 25 MG: 50 INJECTION, SOLUTION INTRAMUSCULAR; INTRAVENOUS at 04:22

## 2022-01-12 RX ADMIN — OXYCODONE HYDROCHLORIDE 10 MG: 10 TABLET ORAL at 16:18

## 2022-01-12 RX ADMIN — HYDROCORTISONE SODIUM SUCCINATE 50 MG: 100 INJECTION, POWDER, FOR SOLUTION INTRAMUSCULAR; INTRAVENOUS at 21:46

## 2022-01-12 RX ADMIN — PANCRELIPASE 48000 UNITS: 24000; 76000; 120000 CAPSULE, DELAYED RELEASE PELLETS ORAL at 08:11

## 2022-01-12 RX ADMIN — HYDROCORTISONE SODIUM SUCCINATE 50 MG: 100 INJECTION, POWDER, FOR SOLUTION INTRAMUSCULAR; INTRAVENOUS at 05:56

## 2022-01-12 RX ADMIN — VALACYCLOVIR HYDROCHLORIDE 500 MG: 500 TABLET, FILM COATED ORAL at 12:27

## 2022-01-12 RX ADMIN — OXCARBAZEPINE 300 MG: 150 TABLET, FILM COATED ORAL at 21:47

## 2022-01-12 RX ADMIN — HYDROCORTISONE SODIUM SUCCINATE 50 MG: 100 INJECTION, POWDER, FOR SOLUTION INTRAMUSCULAR; INTRAVENOUS at 14:23

## 2022-01-12 RX ADMIN — DOCUSATE SODIUM AND SENNOSIDES 1 TABLET: 8.6; 5 TABLET, FILM COATED ORAL at 11:20

## 2022-01-12 RX ADMIN — TACROLIMUS 3 MG: 0.5 CAPSULE ORAL at 08:10

## 2022-01-12 NOTE — ASSESSMENT & PLAN NOTE
- staph pseudintermedius/schleiferi  - 2/2 sets positive for Gram-positive cocci on admission  - source is not entirely clear at this time imaging of the chest and abdomen/pelvis was unrevealing  She has no open skin lesions  No recent trauma  MRI of the thoracic spine where she has had some pain was unrevealing  - status post port removal on 01/10  Catheter tip no growth    - continue IV daptomycin   Will premedicate with benadryl prior to daptomycin due to itching  -  cefazolin DCd per ID recommendations  - follow-up repeat blood cultures drawn 1/9  Can place PICC line after blood cultures negative at 72 hours  - port to be replaced at a later date after antibiotic course completed  - monitor WBC count and fever curve  - deferred DEJUAN for now

## 2022-01-12 NOTE — QUICK NOTE
Asked to evaluate patient by nursing, with patient complaining of feeling like her throat is closing  Upon entering the room the patient appeared visually anxious, sounded mildly hoarse, and was grabbing her throat  There was slight bilateral cheek flushing, which nursing reports is not new  Patient is currently 100% O2 RA  No oropharyngeal, neck, or tongue swelling was able to be appreciated, bilateral lung sounds clear to auscultation  Recently given IV Benadryl 25 mg prior to starting IV daptomycin infusion to prevent itching  Communicated findings with [facility] provider, and additional IV 25mg Benadryl was administered  Instructed nursing to closely monitor patient and her oxygenation status      Cedrick Butler PA-C

## 2022-01-12 NOTE — PROGRESS NOTES
Progress Note - Infectious Disease   Rosetta Fortune 42 y o  female MRN: [id number]  Unit/Bed#: -01 Encounter: [id number]    Impression/Recommendations:  1  Sepsis, POA:  Fever, leukopenia in the setting of Staph bacteremia  Temperature curve improving, WBC has normalized  · Antibiotic therapy as below  · Monitor clinical response, temperature curve  · Supportive care per primary service  2  Staph pseudintermedius, Staph schleiferi bacteremia:  Suspected port infection  Presumed zoonotic infection as patient has three pet dogs at home  Port was removed on Jan 10th; catheter tip cx are negative  Central venous catheter placed given patient's poor venous access  Temperature curve improving  2D echocardiogram limited study with aortic and pulmonic pulmonic valves not well visualized  1/9 repeat blood cx are negative to date  · Follow repeat blood cultures to assess for clearance of bacteremia  · Defer DEJUAN for now unless fevers rebound or bacteremia persists  · Continue daptomycin IV, verified with microbiology lab that both Staph species are susceptible to daptomycin  · Suggest PICC line insertion if repeat blood cx are negative at 72 hours  · Anticipate 2 week duration of IV antibiotic therapy from time of port removal until Jan 24th  Rx provided to case management team   · Weekly labs while on daptomycin:  CBC with diff, creatinine, LFTs, CK    3  Cystic fibrosis:  Status post bilateral lung transplant 2013 and left lung retransplant 2017  Immunocompromised patient  Patient has had multiple infections in the past and 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  She follows with [facility] transplant  · Continues chronic immunosuppression with tacrolimus and prednisone  · Continue prophylaxis with atovaquone and Valtrex  · CMV monitoring as outpatient      4  Malnutrition and diabetes mellitus  · Supportive care as per primary     My recommendations were discussed with the patient who verbalized understanding  My recommendations were discussed with Dr Pretty Alexander from the primary service via secure text  The ID service will follow      Antibiotics: daptomycin since Jan 8th  Scheduled Meds:  Current Facility-Administered Medications   Medication Dose Route Frequency Provider Last Rate    acetaminophen  650 mg Oral Q4H PRN Huan Matt, MD      ALPRAZolam  1 mg Oral BID Huan Levels, MD      aluminum-magnesium hydroxide-simethicone  30 mL Oral Q4H PRN WAYNE Sanchez      apixaban  5 mg Oral BID Huan Levels, MD      atovaquone  1,500 mg Oral Daily Huan Levels, MD      DAPTOmycin  8 mg/kg Intravenous Q24H Mare Reynoso  mg (01/12/22 1222)    diphenhydrAMINE  25 mg Intravenous Q6H PRN Yolanda [place] [facility], DO      hydrocortisone sodium succinate  50 mg Intravenous [facility] Huan Levels, MD      hydrOXYzine HCL  50 mg Oral Q6H PRN WAYNE Sanchez      insulin lispro  1-5 Units Subcutaneous 4x Daily (AC & HS) WAYNE Sanchez      lidocaine  2 patch Topical Daily Huan Levels, MD      OXcarbazepine  300 mg Oral Q12H [address] WAYNE Yang      oxyCODONE  10 mg Oral Q4H PRN Yolanda [place] Starsinic, DO      oxyCODONE  5 mg Oral Q6H PRN Yolanda [place] Starsinic, DO      pancrelipase (Lip-Prot-Amyl)  48,000 Units Oral TID With Meals Yolanda Gunderson,       pantoprazole  40 mg Oral Early Morning Huan Levels, MD      polyethylene glycol  17 g Oral BID PRN Huan Matt, MD      polyethylene glycol  17 g Oral Daily Sai Serrano MD      pregabalin  75 mg Oral HS WAYNE Yang      senna-docusate sodium  1 tablet Oral HS Sai Serrano MD      sodium chloride  4 mL Nebulization BID Yolanda Gunderson,       tacrolimus  3 mg Oral BID Huan Levels, MD      traZODone  25 mg Oral HS Huan Levels, MD      valACYclovir  500 mg Oral Q24H Mare Reynoso MD       Continuous Infusions:   PRN Meds:   acetaminophen    aluminum-magnesium hydroxide-simethicone    diphenhydrAMINE    hydrOXYzine HCL    oxyCODONE    oxyCODONE    polyethylene glycol    Subjective:  Patient reports itching is controlled with Benadryl  She denies fever, chills, rash, vomiting, diarrhea, shortness of breath  No pain is reported  Objective:  Vitals:  Temp:  [97 8 °F (36 6 °C)-98 3 °F (36 8 °C)] 98 3 °F (36 8 °C)  HR:  [60-84] 75  Resp:  [18-20] 19  BP: (118-146)/(76-94) 132/87  SpO2:  [95 %-98 %] 97 %  Temp (24hrs), Av °F (36 7 °C), Min:97 8 °F (36 6 °C), Max:98 3 °F (36 8 °C)  Current: Temperature: 98 3 °F (36 8 °C)  Physical Exam:   General Appearance:  Alert, awake, nontoxic, no acute distress   ENT: Oropharynx moist without lesions   Lungs:   Clear to auscultation bilaterally; respirations unlabored   Heart:  S1, S2, RRR, no murmur   Abdomen:   Soft, non-tender, non-distended   : No Kim catheter present   Extremities: No distal leg edema b/l   Neurologic: AAO to person, surroundings, conversant, fluent speech   Skin: RT IJ CVC intact  RT ACW mediport exit site wound is closed without erythema or drainage   No rash     Labs, Cultures, Imaging, & Other studies:   All pertinent labs, cultures and imaging studies were personally reviewed  Results from last 7 days   Lab Units 22  0629 22  0508 01/10/22  0432   WBC Thousand/uL 4 57 4 54 3 85*   HEMOGLOBIN g/dL 10 9* 9 5* 9 2*   PLATELETS Thousands/uL 223 201 180     Results from last 7 days   Lab Units 22  0629 22  0508 22  0508 01/10/22  0432 01/10/22  0432 22  0538 22  1113 22  0921 22  0921   SODIUM mmol/L 137  --  139  --  138   < > 133*   < > 140   POTASSIUM mmol/L 3 9   < > 3 6   < > 4 0   < > 4 8   < > 3 8   CHLORIDE mmol/L 99*   < > 101   < > 102   < > 99*   < > 103   CO2 mmol/L 30   < > 30   < > 27   < > 25   < > 29   BUN mg/dL 26*   < > 24   < > 19   < > 20   < > 22   CREATININE mg/dL 0 89   < > 0 90   < > 1 06   < > 1 18   < > 1 10   EGFR ml/min/1 73sq m 79   < > 78   < > 64   < > 56   < > 61   CALCIUM mg/dL 9 3   < > 8 7   < > 8 6   < > 7 5*   < > 7 9*   AST U/L  --   --   --   --  21  --  42  --  59*   ALT U/L  --   --   --   --  48  --  105*   < > 160*   ALK PHOS U/L  --   --   --   --  189*  --  228*   < > 283*    < > = values in this interval not displayed  Results from last 7 days   Lab Units 01/09/22  0524 01/07/22  0921   BLOOD CULTURE  No Growth at 48 hrs  No Growth at 48 hrs   Staphylococcus schleiferi*  Staphylococcus pseudintermedius*  Staphylococcus schleiferi*  Staphylococcus pseudintermedius*   GRAM STAIN RESULT   --  Gram positive cocci in clusters*  Gram positive cocci in clusters*     Results from last 7 days   Lab Units 01/08/22  1113 01/07/22  0921   PROCALCITONIN ng/ml 0 24 0 21         Results from last 7 days   Lab Units 01/09/22  0538   FERRITIN ng/mL 12

## 2022-01-12 NOTE — PLAN OF CARE
Problem: MOBILITY - ADULT  Goal: Maintain or return to baseline ADL function  Description: INTERVENTIONS:  -  Assess patient's ability to carry out ADLs; assess patient's baseline for ADL function and identify physical deficits which impact ability to perform ADLs (bathing, care of mouth/teeth, toileting, grooming, dressing, etc )  - Assess/evaluate cause of self-care deficits   - Assess range of motion  - Assess patient's mobility; develop plan if impaired  - Assess patient's need for assistive devices and provide as appropriate  - Encourage maximum independence but intervene and supervise when necessary  - Involve family in performance of ADLs  - Assess for home care needs following discharge   - Consider OT consult to assist with ADL evaluation and planning for discharge  - Provide patient education as appropriate  Outcome: Progressing  Goal: Maintains/Returns to pre admission functional level  Description: INTERVENTIONS:  - Perform BMAT or MOVE assessment daily    - Set and communicate daily mobility goal to care team and patient/family/caregiver  - Collaborate with rehabilitation services on mobility goals if consulted  - Perform Range of Motion 3 times a day  - Reposition patient every 2 hours    - Dangle patient 3 times a day  - Stand patient 3 times a day  - Ambulate patient 3 times a day  - Out of bed to chair 3 times a day   - Out of bed for meals 3 times a day  - Out of bed for toileting  - Record patient progress and toleration of activity level   Outcome: Progressing     Problem: PAIN - ADULT  Goal: Verbalizes/displays adequate comfort level or baseline comfort level  Description: Interventions:  - Encourage patient to monitor pain and request assistance  - Assess pain using appropriate pain scale  - Administer analgesics based on type and severity of pain and evaluate response  - Implement non-pharmacological measures as appropriate and evaluate response  - Consider cultural and social influences on pain and pain management  - Notify physician/advanced practitioner if interventions unsuccessful or patient reports new pain  Outcome: Progressing     Problem: INFECTION - ADULT  Goal: Absence or prevention of progression during hospitalization  Description: INTERVENTIONS:  - Assess and monitor for signs and symptoms of infection  - Monitor lab/diagnostic results  - Monitor all insertion sites, i e  indwelling lines, tubes, and drains  - Monitor endotracheal if appropriate and nasal secretions for changes in amount and color  - [place] appropriate cooling/warming therapies per order  - Administer medications as ordered  - Instruct and encourage patient and family to use good hand hygiene technique  - Identify and instruct in appropriate isolation precautions for identified infection/condition  Outcome: Progressing  Goal: Absence of fever/infection during neutropenic period  Description: INTERVENTIONS:  - Monitor WBC    Outcome: Progressing

## 2022-01-12 NOTE — PROGRESS NOTES
[address] [facility]  Progress Note Home Dasilva Old 1975, 42 y o  female MRN: [id number]  Unit/Bed#: -Alexandra Encounter: [id number]  Primary Care Provider: Chay Zhang MD   Date and time admitted to hospital: 1/7/2022  8:07 AM    Neuropathic pain of right foot  Assessment & Plan  - neurology following: increasing Trileptal to 300 mg twice a day and tapering Lyrica to 75 mg at bedtime  Gram-positive cocci bacteremia  Assessment & Plan  - staph pseudintermedius/schleiferi  - 2/2 sets positive for Gram-positive cocci on admission  - source is not entirely clear at this time imaging of the chest and abdomen/pelvis was unrevealing  She has no open skin lesions  No recent trauma  MRI of the thoracic spine where she has had some pain was unrevealing  - status post port removal on 01/10  Catheter tip no growth    - continue IV daptomycin  Will premedicate with benadryl prior to daptomycin due to itching  -  cefazolin DCd per ID recommendations  - follow-up repeat blood cultures drawn 1/9  Can place PICC line after blood cultures negative at 72 hours  - port to be replaced at a later date after antibiotic course completed  - monitor WBC count and fever curve  - deferred DEJUAN for now      Cystic fibrosis [facility])  Assessment & Plan  - History noted  - s/p lung transplant    Severe protein-calorie malnutrition ([facility] [address] )  Assessment & Plan  - nutrition evaluation    Chronic kidney disease  Assessment & Plan  - renal function at baseline; continue to monitor    Long term current use of systemic steroids  Assessment & Plan  - on long-term systemic steroids due to history of lung transplant  - currently on IV hydrocortisone given current sepsis/infection    Acute on chronic anemia  Assessment & Plan  - no active signs or symptoms of bleeding  - patient denies any hematochezia, melena, or hematemesis  Denies any hematuria  Has not had menses for few months    - Hb stable at 9 5  - monitor clinically for signs or symptoms of bleeding  - transfuses 1 unit irradiated PRBCs 1/9    DVT (deep venous thrombosis) ([facility])  Assessment & Plan  - stable  - Continue Eliquis    Chronic pain disorder  Assessment & Plan  - Continue home meds    Asthma  Assessment & Plan  - Continue duo nebs as needed    Pancreatic insufficiency  Assessment & Plan  - no abd pain  - history of cystic fibrosis  - Continue pancrelipase    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, with long-term current use of insulin ([facility])  Assessment & Plan  - last hemoglobin A1c on record in November 2021 showed hemoglobin A1c of 6 1%  - monitor on ISS for now    Lung transplant status, bilateral ([facility] [address] )  Assessment & Plan  - history of lung transplant secondary to cystic fibrosis  - follows with [facility]  - history of pneumonia with Pseudomonas    - continue home tacrolimus, stress dose steroids, for now  - continue prophylactic atovaquone and valacyclovir    * Sepsis ([facility] [address] )  Assessment & Plan  - repeat Blood cultures: no growth 24 hours, will wait for 72 hrs till a new line is obtained  Pt says she cannot get a PICC line  IR will be consulted for other options  - met criteria on admission with tachycardia, tachypnea, fever, hypotension, and now with Gram-positive bacteremia  - treatment plan as above      VTE Pharmacologic Prophylaxis:   Pharmacologic: Apixaban (Eliquis)  Mechanical VTE Prophylaxis in Place: Yes    Patient Centered Rounds: I have performed bedside rounds with nursing staff today  Discussions with Specialists or Other Care Team Provider: cm, nursing    Education and Discussions with Family / Patient: pt    Time Spent for Care: 30 minutes  More than 50% of total time spent on counseling and coordination of care as described above      Current Length of Stay: 5 day(s)    Current Patient Status: Inpatient   Certification Statement: The patient will continue to require additional inpatient hospital stay due to See above    Discharge Plan:  Pending blood cultures negative at 72 hours  Will need PICC line likely place tomorrow    Code Status: Level 1 - Full Code      Subjective:   No acute complaints    Objective:     Vitals:   Temp (24hrs), Av °F (36 7 °C), Min:97 8 °F (36 6 °C), Max:98 3 °F (36 8 °C)    Temp:  [97 8 °F (36 6 °C)-98 3 °F (36 8 °C)] 98 3 °F (36 8 °C)  HR:  [60-84] 75  Resp:  [18-20] 19  BP: (118-146)/(76-94) 132/87  SpO2:  [95 %-98 %] 97 %  Body mass index is 17 56 kg/m²  Input and Output Summary (last 24 hours): Intake/Output Summary (Last 24 hours) at 2022 0825  Last data filed at 2022 1286  Gross per 24 hour   Intake 1440 ml   Output 1800 ml   Net -360 ml       Physical Exam:     Physical Exam  Constitutional:       General: She is not in acute distress  Appearance: She is well-developed  She is not diaphoretic  HENT:      Head: Normocephalic and atraumatic  Nose: Nose normal       Mouth/Throat:      Pharynx: No oropharyngeal exudate  Eyes:      General: No scleral icterus  Right eye: No discharge  Left eye: No discharge  Conjunctiva/sclera: Conjunctivae normal    Neck:      Thyroid: No thyromegaly  Vascular: No JVD  Cardiovascular:      Rate and Rhythm: Normal rate and regular rhythm  Heart sounds: Normal heart sounds  No murmur heard  No friction rub  No gallop  Pulmonary:      Effort: Pulmonary effort is normal  No respiratory distress  Breath sounds: Normal breath sounds  No wheezing or rales  Chest:      Chest wall: No tenderness  Abdominal:      General: Bowel sounds are normal  There is no distension  Palpations: Abdomen is soft  Tenderness: There is no abdominal tenderness  There is no guarding or rebound  Musculoskeletal:         General: No tenderness or deformity  Normal range of motion  Cervical back: Normal range of motion and neck supple  Skin:     General: Skin is warm and dry  Findings: No erythema or rash  Neurological:      Mental Status: She is alert  Mental status is at baseline  Cranial Nerves: No cranial nerve deficit  Sensory: No sensory deficit  Motor: No abnormal muscle tone  Coordination: Coordination normal          Additional Data:     Labs:    Results from last 7 days   Lab Units 01/12/22  0629   WBC Thousand/uL 4 57   HEMOGLOBIN g/dL 10 9*   HEMATOCRIT % 37 8   PLATELETS Thousands/uL 223   NEUTROS PCT % 49   LYMPHS PCT % 44   MONOS PCT % 7   EOS PCT % 0     Results from last 7 days   Lab Units 01/12/22  0629 01/11/22  0508 01/10/22  0432   SODIUM mmol/L 137   < > 138   POTASSIUM mmol/L 3 9   < > 4 0   CHLORIDE mmol/L 99*   < > 102   CO2 mmol/L 30   < > 27   BUN mg/dL 26*   < > 19   CREATININE mg/dL 0 89   < > 1 06   ANION GAP mmol/L 8   < > 9   CALCIUM mg/dL 9 3   < > 8 6   ALBUMIN g/dL  --   --  2 5*   TOTAL BILIRUBIN mg/dL  --   --  0 26   ALK PHOS U/L  --   --  189*   ALT U/L  --   --  48   AST U/L  --   --  21   GLUCOSE RANDOM mg/dL 219*   < > 250*    < > = values in this interval not displayed  Results from last 7 days   Lab Units 01/07/22  0921   INR  1 07     Results from last 7 days   Lab Units 01/12/22  0637 01/12/22  0122 01/11/22  2155 01/11/22  1636 01/11/22  1110 01/11/22  0656 01/10/22  2341 01/10/22  2117 01/10/22  1710 01/10/22  1043 01/10/22  0550 01/10/22  0042   POC GLUCOSE mg/dl 220* 195* 388* 375* 232* 191* 213* 375* 298* 177* 252* 256*         Results from last 7 days   Lab Units 01/08/22  1113 01/07/22  0921   LACTIC ACID mmol/L  --  1 6   PROCALCITONIN ng/ml 0 24 0 21           * I Have Reviewed All Lab Data Listed Above  * Additional Pertinent Lab Tests Reviewed:  All Labs Within Last 24 Hours Reviewed    Imaging:    Imaging Reports Reviewed Today Include: na  Imaging Personally Reviewed by Myself Includes:  na    Recent Cultures (last 7 days):     Results from last 7 days   Lab Units 01/09/22  0524 01/07/22  0921   BLOOD CULTURE  No Growth at 48 hrs  No Growth at 48 hrs   Staphylococcus schleiferi*  Staphylococcus pseudintermedius*  Staphylococcus schleiferi*  Staphylococcus pseudintermedius*   GRAM STAIN RESULT   --  Gram positive cocci in clusters*  Gram positive cocci in clusters*       Last 24 Hours Medication List:   Current Facility-Administered Medications   Medication Dose Route Frequency Provider Last Rate    acetaminophen  650 mg Oral Q4H PRN Jennifer Jones MD      ALPRAZolam  1 mg Oral BID Jennifer Jones MD      aluminum-magnesium hydroxide-simethicone  30 mL Oral Q4H PRN Alycia Guard, CRNP      apixaban  5 mg Oral BID Jennifer Jones MD      atovaquone  1,500 mg Oral Daily Jennifer Jones MD      DAPTOmycin  8 mg/kg Intravenous Q24H Jihan Moon  mg (01/11/22 1236)    diphenhydrAMINE  25 mg Intravenous Q6H PRN Jael Blood Starsinic, DO      hydrocortisone sodium succinate  50 mg Intravenous [facility] Jennifer Jones MD      hydrOXYzine HCL  50 mg Oral Q6H PRN Alycia Guard, CRNP      insulin lispro  1-5 Units Subcutaneous 4x Daily (AC & HS) Alycia Guard, CRARIEL      lidocaine  2 patch Topical Daily Jennifer Jones MD      OXcarbazepine  300 mg Oral Q12H [facility] WAYNE Yang      oxyCODONE  10 mg Oral Q4H PRN Jael Blood Starsinic, DO      oxyCODONE  5 mg Oral Q6H PRN Jael Blood Starsinic, DO      pancrelipase (Lip-Prot-Amyl)  48,000 Units Oral TID With Meals Jael Blood Starsinic, DO      pantoprazole  40 mg Oral Early Morning Jennifer Jones MD      polyethylene glycol  17 g Oral BID PRN Jennifer Jones MD      pregabalin  75 mg Oral HS WAYNE Yang      sodium chloride  4 mL Nebulization BID Jael Blood Starsinic, DO      tacrolimus  3 mg Oral BID Jennifer Jones MD      traZODone  25 mg Oral HS Jennifer Jones MD      valACYclovir  500 mg Oral Q24H Jihan Moon MD          Today, Patient Was Seen By: Marquise Beckman MD    ** Please Note: Dictation voice to text software may have been used in the creation of this document   **

## 2022-01-12 NOTE — PROGRESS NOTES
Progress Note - Neurology   Prabhakar Cameron 42 y o  female MRN: [id number]  Unit/Bed#: -01 Encounter: [id number]      Assessment/Plan   Neuropathic pain of right foot  Assessment & Plan  Miguelina Barrett is a 42 y o  female with cystic fibrosis s/p lung transplant in 2013 and 2017, recent right pneumonectomy (mid October 2021), currently on immunosuppressive therapy, DVT on Eliquis who presents to [place] ED on 01/07/2020 to with fever, found to be septic  Neurology asked to evaluate the patient for neuropathic pain of right foot  Patient reports pain began immediately after pneumonectomy in mid October 2021  Etiology unclear at this time  Patient reports she was told this was secondary to compression of the nerve  Other considerations include lumbar radiculopathy vs focal distal neuropathy  Found to have tenderness of right medial ankle, 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  Plan:  - Will obtain EMG testing outpatient, recommend obtaining in 3 weeks after completion of antibiotic treatment   - Lyrica discontinued   - Continue Trileptal 300 mg BID  - PT/OT  - Monitor neuro exam; notify with any changes  - Medical management and supportive care per primary team  Correction of metabolic or infectious disturbances   - Patient does not wish to follow-up with [facility]  Recommend contacting surgeon for a more clear assessment in regards to the details of the surgery and possible complications resulting in right lower extremity atrophy and neuropathic pain    - Patient to follow up with NIS[facility] GARZA outpatient neurology on discharge       Gram-positive cocci bacteremia  Assessment & Plan  - Septic on presentation, tachycardia, tachypnea, febrile, hypotensive  - Blood cultures x2 on presentation positive for Staphylococcus schleiferi and staphylococcus pseudintermedius  - Repeat blood cultures x2 01/09/2022 pending  - S/p port removal 01/10/2022  - Plan to place PICC line after blood cultures negative x 72 hours; plan to replace port after antibiotic course completed  - DEJUAN deferred per primary team   - Antibiotic management per primary team    DVT (deep venous thrombosis) ([facility] [address] )  Assessment & Plan  - On Eliquis, plan to continue    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, with long-term current use of insulin [facility])  Assessment & Plan  Lab Results   Component Value Date    HGBA1C 6 3 (H) 11/08/2021       Recent Labs     01/10/22  0042 01/10/22  0550 01/10/22  1043 01/10/22  1710   POCGLU 256* 252* 177* 298*       Blood Sugar Average: Last 72 hrs:  (P) 251 [id number]     - Medical management per primary team    Lung transplant status, bilateral ([facility] [address] )  Assessment & Plan  - History of lung transplant secondary to cystic fibrosis  - Follows with Keagan Night will need follow up in in 4 weeks with general attending or advance practitioner  She will require a EMG/NCS within 3 weeks  Subjective: Today patient initially states her right lower extremity pain is similar to yesterday, however later on reports that there is a slight intensity in pain  Patient states she is tolerating the Trileptal well  Denies chest pain, shortness of breath, abdominal pain, nausea, vomiting, headache, visual changes, weakness/numbness in bilateral upper extremities, dizziness  ROS:  12 point ROS performed, as stated above, all others negative  Vitals: Blood pressure 132/87, pulse 75, temperature 98 3 °F (36 8 °C), resp  rate 19, height 5' 2" (1 575 m), weight 43 5 kg (96 lb), SpO2 97 %, not currently breastfeeding  ,Body mass index is 17 56 kg/m²  Physical Exam  Vitals and nursing note reviewed  Constitutional:       General: She is not in acute distress  Appearance: Normal appearance  She is not toxic-appearing  Comments: Underweight    HENT:      Head: Normocephalic and atraumatic  Eyes:      General: No scleral icterus  Right eye: No discharge  Left eye: No discharge  Extraocular Movements: Extraocular movements intact and EOM normal       Conjunctiva/sclera: Conjunctivae normal    Musculoskeletal:         General: Normal range of motion  Cervical back: Normal range of motion and neck supple  Comments: Tenderness to palpation of right medial ankle    Skin:     General: Skin is warm and dry  Coloration: Skin is not jaundiced or pale  Findings: No bruising, erythema, lesion or rash  Neurological:      Mental Status: She is alert  Psychiatric:         Mood and Affect: Mood normal          Behavior: Behavior normal          Thought Content: Thought content normal          Judgment: Judgment normal        Neurologic Exam     Mental Status   Patient is alert, sitting up in bed  Oriented x3  No dysarthria or aphasia noted  Able to follow central and appendicular commands, names objects provided, and answers all questions appropriately,     Cranial Nerves     CN II   Visual fields full to confrontation  CN III, IV, VI   Extraocular motions are normal    Nystagmus: none   Upgaze: normal  Downgaze: normal  Conjugate gaze: present    CN V   Facial sensation intact  CN VII   Facial expression full, symmetric       CN VIII   Hearing: intact    CN XI   CN XI normal      CN XII   Tongue deviation: none    Motor Exam   Muscle bulk: normal  Overall muscle tone: normal  Bilateral upper and lower extremity reflexes 5/5 throughout except as noted  Distal right lower extremity strength testing limited to pain  Able to give 4-/5 dorsiflexion and plantar flexion     Sensory Exam   Sensation to light touch intact throughout except as noted  Patient reports hyperintense sensation to light touch and right lower extremity  Pinprick intact in BLE      Gait, Coordination, and Reflexes     Tremor   Resting tremor: absent    Reflexes   Right plantar: equivocal  Left plantar: equivocal  No ataxia or dysmetria on bilateral upper extremity finger-to-nose testing  No involuntary movements noted throughout exam     Lab Results: I have personally reviewed pertinent reports    Recent Results (from the past 24 hour(s))   Fingerstick Glucose (POCT)    Collection Time: 01/11/22  4:36 PM   Result Value Ref Range    POC Glucose 375 (H) 65 - 140 mg/dl   Fingerstick Glucose (POCT)    Collection Time: 01/11/22  9:55 PM   Result Value Ref Range    POC Glucose 388 (H) 65 - 140 mg/dl   Fingerstick Glucose (POCT)    Collection Time: 01/12/22  1:22 AM   Result Value Ref Range    POC Glucose 195 (H) 65 - 140 mg/dl   CBC and differential    Collection Time: 01/12/22  6:29 AM   Result Value Ref Range    WBC 4 57 4 31 - 10 16 Thousand/uL    RBC 4 51 3 81 - 5 12 Million/uL    Hemoglobin 10 9 (L) 11 5 - 15 4 g/dL    Hematocrit 37 8 34 8 - 46 1 %    MCV 84 82 - 98 fL    MCH 24 2 (L) 26 8 - 34 3 pg    MCHC 28 8 (L) 31 4 - 37 4 g/dL    RDW 16 7 (H) 11 6 - 15 1 %    MPV 10 8 8 9 - 12 7 fL    Platelets 516 649 - 543 Thousands/uL    nRBC 0 /100 WBCs    Neutrophils Relative 49 43 - 75 %    Immat GRANS % 0 0 - 2 %    Lymphocytes Relative 44 14 - 44 %    Monocytes Relative 7 4 - 12 %    Eosinophils Relative 0 0 - 6 %    Basophils Relative 0 0 - 1 %    Neutrophils Absolute 2 22 1 85 - 7 62 Thousands/µL    Immature Grans Absolute 0 01 0 00 - 0 20 Thousand/uL    Lymphocytes Absolute 2 01 0 60 - 4 47 Thousands/µL    Monocytes Absolute 0 30 0 17 - 1 22 Thousand/µL    Eosinophils Absolute 0 02 0 00 - 0 61 Thousand/µL    Basophils Absolute 0 01 0 00 - 0 10 Thousands/µL   Basic metabolic panel    Collection Time: 01/12/22  6:29 AM   Result Value Ref Range    Sodium 137 136 - 145 mmol/L    Potassium 3 9 3 5 - 5 3 mmol/L    Chloride 99 (L) 100 - 108 mmol/L    CO2 30 21 - 32 mmol/L    ANION GAP 8 4 - 13 mmol/L    BUN 26 (H) 5 - 25 mg/dL    Creatinine 0 89 0 60 - 1 30 mg/dL    Glucose 219 (H) 65 - 140 mg/dL    Calcium 9 3 8 3 - 10 1 mg/dL    eGFR 79 ml/min/1 73sq m   Fingerstick Glucose (POCT) Collection Time: 01/12/22  6:37 AM   Result Value Ref Range    POC Glucose 220 (H) 65 - 140 mg/dl   Fingerstick Glucose (POCT)    Collection Time: 01/12/22 10:40 AM   Result Value Ref Range    POC Glucose 303 (H) 65 - 140 mg/dl   ]  Imaging Studies: I have personally reviewed pertinent reports and I have personally reviewed pertinent films in PACS  EKG, Pathology, and Other Studies: I have personally reviewed pertinent reports  VTE Prophylaxis: Eliquis    Counseling / Coordination of Care  Total time spent today 36 minutes  Greater than 50% of total time was spent with the patient and/or family counseling and/or coordination of care  A description of the counseling/coordination of care:  Patient was seen and evaluated  Discussed with attending  Chart reviewed thoroughly including laboratory and imaging studies  Plan of care discussed with patient and primary team   Discussed plan to discontinue Lyrica tonight and continue on Trileptal twice a day with patient and primary team  Also discussed plan to obtain outpatient EMG testing  Dictation voice to text software has been used in the creation of this document  Please consider this in light of any contextual or grammatical errors

## 2022-01-12 NOTE — PLAN OF CARE
Problem: INFECTION - ADULT  Goal: Absence or prevention of progression during hospitalization  Description: INTERVENTIONS:  - Assess and monitor for signs and symptoms of infection  - Monitor lab/diagnostic results  - Monitor all insertion sites, i e  indwelling lines, tubes, and drains  - Monitor endotracheal if appropriate and nasal secretions for changes in amount and color  - [place] appropriate cooling/warming therapies per order  - Administer medications as ordered  - Instruct and encourage patient and family to use good hand hygiene technique  - Identify and instruct in appropriate isolation precautions for identified infection/condition  Outcome: Progressing

## 2022-01-12 NOTE — CASE MANAGEMENT
Case Management Discharge Planning Note    Patient name Clide Organ  Location [address] [id number] MRN [id number]  : 1975 Date 2022       Current Admission Date: 2022  Current Admission Diagnosis:Sepsis [facility])   Patient Active Problem List    Diagnosis Date Noted    Neuropathic pain of right foot 01/10/2022    Gram-positive cocci bacteremia 2022    Moderate protein-calorie malnutrition ([facility] ) 2022    Weakness of right lower extremity 2021    Chest pain 2021    Cystic fibrosis ([facility] ) 2021    Essential hypertension 2021    Hypercholesteremia 2021    [facility] failure with hypoxia [facility] ) 2021    [facility]epsis ([email or web address] ) 2021    Fatigue 2021    Long term current use of bisphosphonates 2020    Long term current use of systemic steroids 2020    Osteoporosis 2020    Chronic pain disorder 2020    Asthma 2020    Abnormal CT scan, colitis 01/10/2020    Chronic kidney disease 2019    Hiatal hernia 2019    Acute on chronic anemia 2018    DVT (deep venous thrombosis) ([facility] ) 2018    Severe protein-calorie malnutrition ([facility] ) 2018    Cytomegalovirus (CMV) viremia ([facility] ) 2017    Thrombocytopenia ([facility] ) 2017    Immunosuppression ([facility] ) 2017    CMV (cytomegalovirus infection) status negative 2017    Transplant rejection 2016    Colon polyp 2016    Lung transplant status, bilateral ([facility] ) 2013    Anxiety 2012    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, with long-term current use of insulin ([facility] ) 10/28/2010    Pancreatic insufficiency 10/28/2010    Chronic sinusitis 10/28/2010    Esophageal reflux 10/28/2010      LOS (days): 5  Geometric Mean LOS (GMLOS) (days):   Days to GMLOS:     OBJECTIVE:  Risk of Unplanned Readmission Score: 25   Bundled Patient Payment:  (The pt is not a Bundle) Current admission status: Inpatient   Preferred Pharmacy:   [id number] [place] - [address]  [address]  Phone: [phone] Fax: [phone]    Primary Care Provider: Glenny Dunn MD    Primary Insurance: BLUE CROSS  Secondary Insurance: MEDICARE    DISCHARGE DETAILS:    Other Referral/Resources/Interventions Provided:  Referral Comments: Cm was provided with the RX for the pt ABX and informed that [facility] [id number] [facility] will be [facility] tomorrow  [facility] and the referral for [address] with [facility] to [facility]  [facility] states that the pt has a $1000 deductible to be met as well as additional insurance fees  There will be no upfront cost for the pt but if there is any money required up to the deductible it will be billed to the pt  Cm asked if there was any way to assess the billed cost to the pt    Cm also need to obtain the pt physical address [address], [place], [place]

## 2022-01-13 ENCOUNTER — APPOINTMENT (INPATIENT)
Dept: NON INVASIVE DIAGNOSTICS | Facility: HOSPITAL | Age: 47
DRG: 314 | End: 2022-01-13
Attending: INTERNAL MEDICINE
Payer: COMMERCIAL

## 2022-01-13 LAB
ANION GAP SERPL CALCULATED.3IONS-SCNC: 6 MMOL/L (ref 4–13)
BACTERIA CATH TIP CULT: NO GROWTH
BACTERIA CATH TIP CULT: NORMAL
BASOPHILS # BLD AUTO: 0 THOUSANDS/ΜL (ref 0–0.1)
BASOPHILS NFR BLD AUTO: 0 % (ref 0–1)
BUN SERPL-MCNC: 31 MG/DL (ref 5–25)
CALCIUM SERPL-MCNC: 8.7 MG/DL (ref 8.3–10.1)
CHLORIDE SERPL-SCNC: 99 MMOL/L (ref 100–108)
CO2 SERPL-SCNC: 31 MMOL/L (ref 21–32)
CREAT SERPL-MCNC: 0.81 MG/DL (ref 0.6–1.3)
EOSINOPHIL # BLD AUTO: 0.02 THOUSAND/ΜL (ref 0–0.61)
EOSINOPHIL NFR BLD AUTO: 0 % (ref 0–6)
ERYTHROCYTE [DISTWIDTH] IN BLOOD BY AUTOMATED COUNT: 16.7 % (ref 11.6–15.1)
GFR SERPL CREATININE-BSD FRML MDRD: 88 ML/MIN/1.73SQ M
GLUCOSE SERPL-MCNC: 193 MG/DL (ref 65–140)
GLUCOSE SERPL-MCNC: 210 MG/DL (ref 65–140)
GLUCOSE SERPL-MCNC: 246 MG/DL (ref 65–140)
GLUCOSE SERPL-MCNC: 316 MG/DL (ref 65–140)
GLUCOSE SERPL-MCNC: 340 MG/DL (ref 65–140)
GLUCOSE SERPL-MCNC: 450 MG/DL (ref 65–140)
HCT VFR BLD AUTO: 32.8 % (ref 34.8–46.1)
HGB BLD-MCNC: 9.6 G/DL (ref 11.5–15.4)
IMM GRANULOCYTES # BLD AUTO: 0.02 THOUSAND/UL (ref 0–0.2)
IMM GRANULOCYTES NFR BLD AUTO: 0 % (ref 0–2)
LYMPHOCYTES # BLD AUTO: 2.13 THOUSANDS/ΜL (ref 0.6–4.47)
LYMPHOCYTES NFR BLD AUTO: 37 % (ref 14–44)
MCH RBC QN AUTO: 23.9 PG (ref 26.8–34.3)
MCHC RBC AUTO-ENTMCNC: 29.3 G/DL (ref 31.4–37.4)
MCV RBC AUTO: 82 FL (ref 82–98)
MONOCYTES # BLD AUTO: 0.43 THOUSAND/ΜL (ref 0.17–1.22)
MONOCYTES NFR BLD AUTO: 7 % (ref 4–12)
NEUTROPHILS # BLD AUTO: 3.19 THOUSANDS/ΜL (ref 1.85–7.62)
NEUTS SEG NFR BLD AUTO: 56 % (ref 43–75)
NRBC BLD AUTO-RTO: 0 /100 WBCS
PLATELET # BLD AUTO: 233 THOUSANDS/UL (ref 149–390)
PMV BLD AUTO: 10.3 FL (ref 8.9–12.7)
POTASSIUM SERPL-SCNC: 3.6 MMOL/L (ref 3.5–5.3)
RBC # BLD AUTO: 4.02 MILLION/UL (ref 3.81–5.12)
SODIUM SERPL-SCNC: 136 MMOL/L (ref 136–145)
WBC # BLD AUTO: 5.79 THOUSAND/UL (ref 4.31–10.16)

## 2022-01-13 PROCEDURE — 36558 INSERT TUNNELED CV CATH: CPT | Performed by: RADIOLOGY

## 2022-01-13 PROCEDURE — 97166 OT EVAL MOD COMPLEX 45 MIN: CPT

## 2022-01-13 PROCEDURE — NC001 PR NO CHARGE: Performed by: RADIOLOGY

## 2022-01-13 PROCEDURE — 77001 FLUOROGUIDE FOR VEIN DEVICE: CPT | Performed by: RADIOLOGY

## 2022-01-13 PROCEDURE — 77001 FLUOROGUIDE FOR VEIN DEVICE: CPT

## 2022-01-13 PROCEDURE — 05HM33Z INSERTION OF INFUSION DEVICE INTO RIGHT INTERNAL JUGULAR VEIN, PERCUTANEOUS APPROACH: ICD-10-PCS | Performed by: RADIOLOGY

## 2022-01-13 PROCEDURE — 85025 COMPLETE CBC W/AUTO DIFF WBC: CPT | Performed by: INTERNAL MEDICINE

## 2022-01-13 PROCEDURE — 0JH63XZ INSERTION OF TUNNELED VASCULAR ACCESS DEVICE INTO CHEST SUBCUTANEOUS TISSUE AND FASCIA, PERCUTANEOUS APPROACH: ICD-10-PCS | Performed by: RADIOLOGY

## 2022-01-13 PROCEDURE — 94640 AIRWAY INHALATION TREATMENT: CPT

## 2022-01-13 PROCEDURE — 76937 US GUIDE VASCULAR ACCESS: CPT

## 2022-01-13 PROCEDURE — 94760 N-INVAS EAR/PLS OXIMETRY 1: CPT

## 2022-01-13 PROCEDURE — 80048 BASIC METABOLIC PNL TOTAL CA: CPT | Performed by: INTERNAL MEDICINE

## 2022-01-13 PROCEDURE — C1751 CATH, INF, PER/CENT/MIDLINE: HCPCS

## 2022-01-13 PROCEDURE — 97163 PT EVAL HIGH COMPLEX 45 MIN: CPT

## 2022-01-13 PROCEDURE — 82948 REAGENT STRIP/BLOOD GLUCOSE: CPT

## 2022-01-13 PROCEDURE — 36558 INSERT TUNNELED CV CATH: CPT

## 2022-01-13 PROCEDURE — 97110 THERAPEUTIC EXERCISES: CPT

## 2022-01-13 PROCEDURE — 99232 SBSQ HOSP IP/OBS MODERATE 35: CPT | Performed by: INTERNAL MEDICINE

## 2022-01-13 RX ORDER — LIDOCAINE HYDROCHLORIDE AND EPINEPHRINE 10; 10 MG/ML; UG/ML
INJECTION, SOLUTION INFILTRATION; PERINEURAL CODE/TRAUMA/SEDATION MEDICATION
Status: COMPLETED | OUTPATIENT
Start: 2022-01-13 | End: 2022-01-13

## 2022-01-13 RX ADMIN — TACROLIMUS 3 MG: 0.5 CAPSULE ORAL at 17:19

## 2022-01-13 RX ADMIN — DOCUSATE SODIUM AND SENNOSIDES 1 TABLET: 8.6; 5 TABLET, FILM COATED ORAL at 21:33

## 2022-01-13 RX ADMIN — DAPTOMYCIN 350 MG: 500 INJECTION, POWDER, LYOPHILIZED, FOR SOLUTION INTRAVENOUS at 14:02

## 2022-01-13 RX ADMIN — OXYCODONE HYDROCHLORIDE 10 MG: 10 TABLET ORAL at 05:56

## 2022-01-13 RX ADMIN — ACETAMINOPHEN 650 MG: 325 TABLET, FILM COATED ORAL at 21:33

## 2022-01-13 RX ADMIN — VALACYCLOVIR HYDROCHLORIDE 500 MG: 500 TABLET, FILM COATED ORAL at 14:03

## 2022-01-13 RX ADMIN — DIPHENHYDRAMINE HYDROCHLORIDE 25 MG: 50 INJECTION, SOLUTION INTRAMUSCULAR; INTRAVENOUS at 14:02

## 2022-01-13 RX ADMIN — POLYETHYLENE GLYCOL 3350 17 G: 17 POWDER, FOR SOLUTION ORAL at 08:05

## 2022-01-13 RX ADMIN — ALPRAZOLAM 1 MG: 0.5 TABLET ORAL at 08:04

## 2022-01-13 RX ADMIN — OXYCODONE HYDROCHLORIDE 10 MG: 10 TABLET ORAL at 11:47

## 2022-01-13 RX ADMIN — HYDROCORTISONE SODIUM SUCCINATE 50 MG: 100 INJECTION, POWDER, FOR SOLUTION INTRAMUSCULAR; INTRAVENOUS at 05:56

## 2022-01-13 RX ADMIN — OXCARBAZEPINE 300 MG: 150 TABLET, FILM COATED ORAL at 21:35

## 2022-01-13 RX ADMIN — HYDROCORTISONE SODIUM SUCCINATE 50 MG: 100 INJECTION, POWDER, FOR SOLUTION INTRAMUSCULAR; INTRAVENOUS at 14:02

## 2022-01-13 RX ADMIN — ATOVAQUONE 1500 MG: 750 SUSPENSION ORAL at 08:06

## 2022-01-13 RX ADMIN — APIXABAN 5 MG: 5 TABLET, FILM COATED ORAL at 08:05

## 2022-01-13 RX ADMIN — HYDROCORTISONE SODIUM SUCCINATE 50 MG: 100 INJECTION, POWDER, FOR SOLUTION INTRAMUSCULAR; INTRAVENOUS at 21:34

## 2022-01-13 RX ADMIN — LIDOCAINE 5% 2 PATCH: 700 PATCH TOPICAL at 08:06

## 2022-01-13 RX ADMIN — PANCRELIPASE 48000 UNITS: 24000; 76000; 120000 CAPSULE, DELAYED RELEASE PELLETS ORAL at 17:14

## 2022-01-13 RX ADMIN — OXYCODONE HYDROCHLORIDE 10 MG: 10 TABLET ORAL at 18:16

## 2022-01-13 RX ADMIN — LIDOCAINE HYDROCHLORIDE,EPINEPHRINE BITARTRATE 5 ML: 10; .01 INJECTION, SOLUTION INFILTRATION; PERINEURAL at 12:33

## 2022-01-13 RX ADMIN — APIXABAN 5 MG: 5 TABLET, FILM COATED ORAL at 17:19

## 2022-01-13 RX ADMIN — SODIUM CHLORIDE SOLN NEBU 3% 4 ML: 3 NEBU SOLN at 20:52

## 2022-01-13 RX ADMIN — PANTOPRAZOLE SODIUM 40 MG: 40 TABLET, DELAYED RELEASE ORAL at 05:56

## 2022-01-13 RX ADMIN — TACROLIMUS 3 MG: 0.5 CAPSULE ORAL at 08:19

## 2022-01-13 RX ADMIN — OXCARBAZEPINE 300 MG: 150 TABLET, FILM COATED ORAL at 08:04

## 2022-01-13 RX ADMIN — PANCRELIPASE 48000 UNITS: 24000; 76000; 120000 CAPSULE, DELAYED RELEASE PELLETS ORAL at 08:06

## 2022-01-13 RX ADMIN — PANCRELIPASE 48000 UNITS: 24000; 76000; 120000 CAPSULE, DELAYED RELEASE PELLETS ORAL at 11:46

## 2022-01-13 NOTE — ASSESSMENT & PLAN NOTE
- staph pseudintermedius/schleiferi  - 2/2 sets positive for Gram-positive cocci on admission  - source is not entirely clear at this time imaging of the chest and abdomen/pelvis was unrevealing  She has no open skin lesions  No recent trauma  MRI of the thoracic spine where she has had some pain was unrevealing  - status post port removal on 01/10  Catheter tip no growth    - continue IV daptomycin   Will premedicate with benadryl prior to daptomycin due to itching  -  cefazolin DCd per ID recommendations  Repeat blood cultures negative at 72 hours  Will place PICC line  - port to be replaced at a later date after antibiotic course completed  - monitor WBC count and fever curve  - deferred DEJUAN for now

## 2022-01-13 NOTE — PROGRESS NOTES
Patient arrived for requested picc line  Patient stated that she would prefer access in her neck because her arm veins are poor  She indicated that she has had previous attempts at picc line placement at the [address] [place] [address] which have been unsuccessful despite extensive effort  Patient currently has a cut IJ picc which is non tunneled  Patient needs to have the access for multiple weeks and is anticipated discharge  The current IJ picc has been recently placed and will be exchanged for a tunneled PICC  Patient is agreeable to this plan  The procedure and risks were discussed with the patient  All questions were answered  Informed consent was obtained

## 2022-01-13 NOTE — OCCUPATIONAL THERAPY NOTE
Occupational Therapy Evaluation        Patient Name: Fidelia Jacques  TXYPY'[id number] Date: 1/13/2022 01/13/22 1006   OT Last Visit   OT Visit Date 01/13/22   Note Type   Note type Evaluation   Restrictions/Precautions   Weight Bearing Precautions Per Order No  (due to pain and discomfort pt keeps RT foot NWB)   Braces or Orthoses Other (Comment)  (None at baseline)   Pain Assessment   Pain Assessment Tool 0-10   Pain Score 7   Pain Location/Orientation Orientation: Right;Location: Foot  (and ankle )   Hospital Pain Intervention(s) Repositioned; Ambulation/increased activity; Emotional support   Multiple Pain Sites No   Home Living   Type of Home House   Home Layout Two level;Bed/bath upstairs; Performs ADLs on one level  (5 ELVIE/ full flight to basement )   Bathroom Shower/Tub Tub/shower unit   Bathroom Toilet Standard   Bathroom Equipment Other (Comment)  (not at baseline)   [facility]   Additional Comments patient ambulates with walker at baseline, maintains NWB to RLE and hops on left foot  Prior Function   Level of [place] Independent with ADLs and functional mobility   Lives With Spouse; Family   Receives Help From Family   ADL Assistance Needs assistance   IADLs Needs assistance   Falls in the last 6 months 0   Lifestyle   Autonomy Patient lives in a 2 story house with 5 [address], and full flight to basement  Patient needs assistance with transfers to/ from tub, is independnet with basic self care and requires assistance with IADLs  Patient does not drive and stays indoor most of the time  Reciprocal Relationships Supportive Family   Psychosocial   Psychosocial (WDL) WDL   Patient Behaviors/Mood Calm; Cooperative   Ability to Express Feelings Able to express   Ability to Express Needs Able to express   Ability to Express Thoughts Able to express   Ability to Understand Others Understands   ADL   Eating Assistance 7  Independent   Grooming Assistance 5 Supervision/Setup   UB Bathing Assistance 5  Supervision/Setup   LB Bathing Assistance 4  Minimal Assistance   UB Dressing Assistance 5  Supervision/Setup   LB Dressing Assistance 4  Minimal [address]  4  Minimal Assistance   Functional Assistance 4  Minimal Assistance   Bed Mobility   Supine to Sit 6  Modified independent   Additional items HOB elevated   Sit to Supine 6  Modified independent   Additional items HOB elevated   Transfers   Sit to Stand 5  Supervision   Additional items Assist x 1;Verbal cues   Stand to Sit 5  Supervision   Additional items Assist x 1; Increased time required;Verbal cues; Bed elevated   Functional Mobility   Functional Mobility 4  Minimal assistance   Additional Comments short distance in room    Additional items Rolling walker   Balance   Static Sitting Good   Dynamic Sitting Good   Static Standing Fair   Dynamic Standing Fair   Activity Tolerance   Activity Tolerance Patient limited by pain   RUE Assessment   RUE Assessment WFL   LUE Assessment   LUE Assessment WFL   Hand Function   Gross Motor Coordination Functional   Fine Motor Coordination Functional   Sensation   Light Touch No apparent deficits   Vision-Basic Assessment   Current Vision Wears glasses all the time   Cognition   Overall Cognitive Status [facility]   Arousal/Participation Alert; Responsive; Cooperative   Attention Within functional limits   Orientation Level Oriented X4   Memory Within functional limits   Following Commands Follows all commands and directions without difficulty   Assessment   Limitation Decreased ADL status; Decreased Safe judgement during ADL;Decreased endurance;Decreased self-care trans;Decreased high-level ADLs   Prognosis Good   Assessment  Patient is a 42 y o  female seen for OT evaluation s/p admit to [address] on 1/7/2022 w/Sepsis [facility])   Commorbidities affecting patient's functional performance at time of assessment include: Bilateral lung transplant, pancreatic insufficiency, cystic fibrosis, severe protein - calorie malnutrition, Anxiety, DVT, Chronic Pain disorder, Asthma, DM,  CKD, DM, presented to ED with fevers  Orders placed for OT evaluation and treatment  Performed at least two patient identifiers during session including name and wristband  Prior to admission, Patient lives in a 2 story house with 5 ELVIE, and full flight to basement  Patient needs assistance with transfers to/ from tub, is independnet with basic self care and requires assistance with IADLs  Patient does not drive and stays indoor most of the time  Personal factors affecting patient at time of initial evaluation include: steps to enter, difficulty performing ADLs and difficulty performing IADLs  Upon evaluation, patient requires supervision and set up assist for UB ADLs, minimal  assist for LB ADLs,   Occupational performance is affected by the following deficits: degenerative arthritic joint changes, impaired gross motor coordination, decreased activity tolerance, increased pain and orthopedic restrictions  Patient to benefit from continued Occupational Therapy treatment while in the hospital to address deficits as defined above and maximize level of functional independence with ADLs and functional mobility  Occupational Performance areas to address include: transfer to all surfaces, functional ambulation, functional mobility, health maintenance, IADLs: safety procedures, IADLs: meal prep/ clean up and Leisure Participation  From OT standpoint, recommendation at time of d/c would be Home with family support, [address] and Home OT  Plan   Treatment Interventions ADL retraining;Functional transfer training; Endurance training;Patient/family training; Compensatory technique education;Cardiac education; Energy conservation; Activityengagement   Goal Expiration Date 01/27/22   OT Frequency 3-5x/wk   Recommendation   OT Discharge Recommendation Home with home health rehabilitation   AM-PAC Daily Activity Inpatient   Lower Body Dressing 3   Bathing 3   Toileting 3   Upper Body Dressing 3   Grooming 4   Eating 4   Daily Activity Raw Score 20   Daily Activity Standardized Score (Calc for Raw Score >=11) 42 03   AM-PAC Applied Cognition Inpatient   Following a Speech/Presentation 4   Understanding Ordinary Conversation 4   Taking Medications 4   Remembering Where Things Are Placed or Put Away 4   Remembering List of 4-5 Errands 4   Taking Care of Complicated Tasks 4   Applied Cognition Raw Score 24   Applied Cognition Standardized Score 62 21   Barthel Index   Feeding 10   Bathing 0   Grooming Score 5   Dressing Score 10   Bladder Score 10   Bowels Score 10   Toilet Use Score 5   Transfers (Bed/Chair) Score 10   Mobility (Level Surface) Score 0   Stairs Score 0   Barthel Index Score 60       1 - Patient will verbalize and demonstrate use of energy conservation/ deep breathing technique and work simplification skills during functional activity with no verbal cues  2 - Patient will verbalize and demonstrate good body mechanics and joint protection techniques during  ADLs/ IADLs with no verbal cues  3 - Patient will increase OOB/ sitting tolerance to 2-4 hours per day for increased participation in self care and leisure tasks with no s/s of exertion  4 - Patient will increase standing tolerance time to 5  minutes with unilateral UE support to complete sink level ADLs@ mod I level  5 - Patient will increase sitting tolerance at edge of bed to 20 minutes to complete UB ADLs @ set up assist level  6 - Patient will transfer bed to Chair / toilet at Set up assist level with AD as indicated  7 - Patient will complete UB ADLs with set up assist      8 - Patient will complete LB ADLs with min assist with the use of adaptive equipment       9 - Patient will complete toileting hygiene with set up assist/ supervision for thoroughness    10 - Patient/ Family  will demonstrate competency with UE Home Exercise Program

## 2022-01-13 NOTE — PHYSICAL THERAPY NOTE
Physical Therapy Evaluation     Patient's Name: Benjamin Alberto    Admitting Diagnosis  Hypomagnesemia [E83 42]  Fever, unknown origin [R50 9]  Leukopenia [D72 819]  Hypotension [I95 9]  Cystic fibrosis ([facility] [address] ) [E84 9]  Fever [R50 9]  FTT (failure to thrive) in adult [R62 7]  Recurrent pneumonia [J18 9]  SIRS (systemic inflammatory response syndrome) ([facility] [address] ) [R65 10]  Immunocompromised state due to drug therapy [facility]) [U58 956, Z79 899]    Problem List  Patient Active Problem List   Diagnosis    Lung transplant status, bilateral ([facility] [address] )    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, with long-term current use of insulin ([facility] [address] )    Abnormal CT scan, colitis    Pancreatic insufficiency    Asthma    Chronic pain disorder    DVT (deep venous thrombosis) ([facility] [address] )    Acute on chronic anemia    Long term current use of bisphosphonates    Long term current use of systemic steroids    Osteoporosis    Anxiety    Chronic kidney disease    Chronic sinusitis    CMV (cytomegalovirus infection) status negative    Colon polyp    Cytomegalovirus (CMV) viremia (HCC)    Esophageal reflux    Hiatal hernia    Immunosuppression (HCC)    Thrombocytopenia (HCC)    Severe protein-calorie malnutrition ([address] )    Transplant rejection    Cystic fibrosis ([facility] [address] [facility] )    Essential hypertension    Hypercholesteremia    Chronic respiratory failure with hypoxia (HCC)    Sepsis ([address] )    Fatigue    Chest pain    Weakness of right lower extremity    Gram-positive cocci bacteremia    Moderate protein-calorie malnutrition (HCC)    Neuropathic pain of right foot     Past Medical History  Past Medical History:   Diagnosis Date    ABPA (allergic bronchopulmonary aspergillosis) ([facility] [address] ) 7/8/2012    Acute kidney injury ([facility] [address] ) 9/8/2017    Acute on chronic respiratory failure ([facility] [address] ) 3/4/2017    Last Assessment & Plan:  Formatting of this note might be different from the original  Recurrent with clinical worsening with enlarging PTX, s/p multiple chest tubes, but new fevers 6/19 and worsening leukocytosis indicating infection as cause of 6/19 decompensation with profound hypoxia in setting of shunting through right lung and continued pneumothorax s/p 2nd chest tube placement  6/27: Right    Asthma     Chronic pain     Cystic fibrosis ([facility] [address] )     Diabetes mellitus ([facility] [address] )     GERD (gastroesophageal reflux disease)     HBP (high blood pressure)     HCAP (healthcare-associated pneumonia) 3/6/2020    Influenza B 2/23/2020    Kidney stone     Neutropenic fever ([facility] [address] ) 9/26/2020    PONV (postoperative nausea and vomiting)     Pseudomonas aeruginosa infection 9/20/2012    Transplant recipient     Lung- 2013, 2017     Past Surgical History  Past Surgical History:   Procedure Laterality Date    APPENDECTOMY      BREAST SURGERY      Aug      BRONCHOSCOPY      CHOLECYSTECTOMY      COLONOSCOPY      EGD  07/2019    Yeast Infect       FL RETROGRADE PYELOGRAM  1/9/2020    FL RETROGRADE PYELOGRAM  2/4/2020    GASTROSTOMY TUBE, PLACE      IR PORT REMOVAL  1/10/2022    LUNG SURGERY      LUNG TRANSPLANT      X2    AR CYSTO/URETERO W/LITHOTRIPSY &INDWELL STENT INSRT Right 2/4/2020    Procedure: CYSTOSCOPY URETEROSCOPY WITH LITHOTRIPSY HOLMIUM LASER, RETROGRADE PYELOGRAM AND INSERTION STENT URETERAL;  Surgeon: Annabelle Connors MD;  Location: [facility];  Service: Urology    AR CYSTOURETHROSCOPY,URETER CATHETER Right 1/9/2020    Procedure: CYSTOSCOPY RETROGRADE PYELOGRAM WITH INSERTION STENT URETERAL;  Surgeon: Annabelle Connors MD;  Location: [facility];  Service: Urology    US GUIDED VASCULAR ACCESS  1/17/2017 01/13/22 1046   PT Last Visit   PT Visit Date 01/13/22   Note Type   Note type Evaluation   Pain Assessment   Pain Assessment Tool 0-10   Pain Score 7   Pain Location/Orientation Orientation: Right;Location: Foot  (and ankle)   Hospital Pain Intervention(s) Ambulation/increased activity; Environmental changes;Repositioned   Multiple Pain Sites No   Restrictions/Precautions   Weight Bearing Precautions Per Order No  (pt prefers to keep R LE NWB due to pain/discomfort)   Braces or Orthoses Other (Comment)  (None per pt )   Other Precautions Fall Risk;Pain   Home Living   Type of [address] Two level;Bed/bath upstairs; Performs ADLs on one level;Stairs to enter with rails  (5 [address]/ full flight to basement )   Bathroom Shower/Tub Tub/shower unit   Bathroom Toilet Standard   Bathroom Equipment Other (Comment)  (None per pt)   [address] Walker   Additional Comments Pt reports ambulating with a RW since October of 2021  Prior to that, pt ambulated without an AD  Prior Function   Level of [place] Independent with ADLs and functional mobility   Lives With Spouse; Family   Receives Help From Family   ADL Assistance Needs assistance   IADLs Needs assistance   Falls in the last 6 months 0   General   Family/Caregiver Present No   Cognition   Overall Cognitive Status WFL   Arousal/Participation Alert   Attention Within functional limits   Orientation Level Oriented X4   Memory Within functional limits   Following Commands Follows all commands and directions without difficulty   Comments Pt is agreeable to PT evaluation    RUE Assessment   RUE Assessment   (Defer to OT assessment )   LUE Assessment   LUE Assessment   (Defer to OT assessment )   RLE Assessment   RLE Assessment X   Strength RLE   R Hip Flexion 3+/5   R Knee Extension 3+/5   R Ankle Dorsiflexion 3/5   LLE Assessment   LLE Assessment X   Strength LLE   L Hip Flexion 4-/5   L Knee Extension 4-/5   L Ankle Dorsiflexion 4-/5   Light Touch   RLE Light Touch Impaired   RLE Light Touch Comments Hypersensitivity R foot   LLE Light Touch Grossly intact   Proprioception   RLE Proprioception Grossly intact  (at great toe and ankle)   Bed Mobility   Supine to Sit 6  Modified independent Additional items HOB elevated   Sit to Supine 6  Modified independent   Additional items HOB elevated   Transfers   Sit to Stand 5  Supervision   Additional items Assist x 1;Verbal cues   Stand to Sit 5  Supervision   Additional items Assist x 1; Increased time required;Verbal cues   Ambulation/Elevation   Gait pattern Excessively slow; Step to;Short stride;Decreased foot clearance;Narrow ODALYS   Gait Assistance 5  Supervision   Additional items Assist x 1;Verbal cues   Assistive Device Rolling walker   Distance 6 feet   Stair Management Assistance 4  Minimal assist   Additional items Assist x 1;Verbal cues   Stair Management Technique Step to pattern; Foreward; With walker   Number of Stairs 1  (6 inch practice step)   Ambulation/Elevation Additional Comments Pt with no WB restrictions; however pt prefers to keep R foot NWB; trial of PWB (heel weight bearing); however pt reporting increased pain and reported she would like to maintain NWB   Balance   Static Sitting Good   Dynamic Sitting Good   Static Standing Fair   Dynamic Standing Fair   Ambulatory Fair   Endurance Deficit   Endurance Deficit Yes   Endurance Deficit Description Decreased activity tolerance    Activity Tolerance   Activity Tolerance Patient limited by pain   Medical Staff Made Aware PT JOSE [place]    Nurse Made Aware RN made aware of session, Post session the pt was returned BTB and was in NAD  The pt's belongings are within reach      Assessment   Prognosis Good   Problem List Decreased strength;Decreased endurance; Impaired balance;Decreased mobility;Pain; Impaired sensation   Assessment Pt is a 42year old female who presents with sepsis on 1/7/22 at [address] [place] [address]  PT orders received for evaluation and treat   Pt has a PMH of bilateral lung transplant, DM due to chronic kidney disease, pancreatic insuffiencey, asthma, chronic pain disorder, DVT, acute on chronic anemia, long term current use of systematic steroids, chronic kidney disease, severe protein calorie malnutrition, cystic fibrosis, neuropathic pain of R foot, and gram-positive cocci bacteremia  Prior to IE the pt was independent with ADLs and functional mobility  The required assistance with her IADLs from her family  Pt reports ambulating with a RW since October of 2021  Prior to that, pt ambulated without an AD  The pt lives in a 2-story home with 5 EL[address]IE with a handrail and a full flight of stairs to her basement  At IE the patient presents with the following impairments including; increased pain, decreased LE strength, decreased endurance, decreased mobility, impaired balance, impaired sensation, gait deviations, decreased activity tolerance, and increased fall risk  The pt presents with increased difficulty when performing sit to stand transfers and ambulation  The pt requires verbal cues and supervision for transfers and ambulation in order to ensure patient safety  The pt reports hypersensitivity on her R foot and increased pain with WB of her R LE  Pt prefers to keep NWB on her R LE, despite no weight bearing restrictions  The pt scores a 20 on the AM-PAC and a 60/100 on the Barthel index  The patient presents as an unstable-unpredictable complexity case due to above impairments and need for continued medical management  PT recommendation at time of d/c is for home with home health PT pending progress to help pt return to PLOF  Barriers to Discharge Inaccessible home environment   Goals   STG Expiration Date 01/23/22   Short Term Goal #1 In 7 to 10 days: Pt will increase strength by 1/2 a grade in order to increase ease with transfers, Pt will be able to perform bed mobility independently in order to decrease caregiver burden, Pt will be able to perform transfers with mod I to improve mobility   Pt will be able to ambulate >50 feet with least restrictive AD and mod I in order to increase independence navigating household distances, pt will improve gross balance by 1/2 a grade to decrease risk of falls, and the pt will be able to navigate a flight of stairs with a hand rail, with most appropriate AD, and mod I to improve home accessibility   Plan   Treatment/Interventions Functional transfer training;LE strengthening/ROM; Therapeutic exercise;Elevations; Patient/family training;Bed mobility;Gait training;Spoke to nursing;OT   PT Frequency 2-3x/wk   Recommendation   PT Discharge Recommendation Home with home health rehabilitation   AM-PAC Basic Mobility Inpatient   Turning in Bed Without Bedrails 4   Lying on Back to Sitting on Edge of Flat Bed 4   Moving Bed to Chair 3   Standing Up From Chair 3   Walk in Room 3   Climb 3-5 Stairs 3   Basic Mobility Inpatient Raw Score 20   Basic Mobility Standardized Score 43 99   Highest Level Of Mobility   JH-HLM Goal 6: Walk 10 steps or more   JH-HLM Highest Level of Mobility 6: Walk 10 steps or more   JH-HLM Goal Achieved Yes   Modified [place] Scale   Modified Jessica Scale 4   Barthel Index   Feeding 10   Bathing 0   Grooming Score 5   Dressing Score 10   Bladder Score 10   Bowels Score 10   Toilet Use Score 5   Transfers (Bed/Chair) Score 10   Mobility (Level Surface) Score 0   Stairs Score 0   Barthel Index Score 60   Additional Treatment Session   Start Time 1031   End Time 1046   Treatment Assessment Post evaluation the pt was agreeable to PT treatment session  The pt performed all seated and supine exercises as indicated below without complaints of increased pain or discomfort  Pt requires occasional verbal cues for correct technique and form  Pt continues to present with pain, decreased lower extremity strength, impaired balance, decreased endurance, gait deviations, impaired sensation, and decreased functional mobility  PT to continue to recommend home with PT services  PT will continue to see pt in order to address pt impairments and help pt return to PLOF  Exercises   Glute Sets Sitting;20 reps;AROM; Bilateral   Hip Flexion Sitting;20 reps;AROM; Bilateral   Hip Adduction Sitting;20 reps;AROM; Bilateral   Knee AROM Long Arc Quad Sitting;20 reps;AROM; Bilateral   Ankle Pumps Sitting;20 reps;AROM; Bilateral   LE AROM  The patient also performed supine: ankle DF/PF, ankle inversion/eversion, and toe curls: 10x on R foot   End of Consult   Patient Position at End of Consult Supine; All needs within reach       PT Evaluation Time: 9579-5222  PT Treatment Time: 5682-4645  15 Minutes     Avi Montaño

## 2022-01-13 NOTE — PLAN OF CARE
Problem: Potential for Falls  Goal: Patient will remain free of falls  Description: INTERVENTIONS:  - Educate patient/family on patient safety including physical limitations  - Instruct patient to call for assistance with activity   - Consult OT/PT to assist with strengthening/mobility   - Keep Call bell within reach  - Keep bed low and locked with side rails adjusted as appropriate  - Keep care items and personal belongings within reach  - Initiate and maintain comfort rounds  - Make Fall Risk Sign visible to staff  - Offer Toileting every 2 Hours, in advance of need  - Initiate/Maintain alarm  - Obtain necessary fall risk management equipment  - Apply yellow socks and bracelet for high fall risk patients  - Consider moving patient to room near nurses station  Outcome: Progressing     Problem: MOBILITY - ADULT  Goal: Maintain or return to baseline ADL function  Description: INTERVENTIONS:  -  Assess patient's ability to carry out ADLs; assess patient's baseline for ADL function and identify physical deficits which impact ability to perform ADLs (bathing, care of mouth/teeth, toileting, grooming, dressing, etc )  - Assess/evaluate cause of self-care deficits   - Assess range of motion  - Assess patient's mobility; develop plan if impaired  - Assess patient's need for assistive devices and provide as appropriate  - Encourage maximum independence but intervene and supervise when necessary  - Involve family in performance of ADLs  - Assess for home care needs following discharge   - Consider OT consult to assist with ADL evaluation and planning for discharge  - Provide patient education as appropriate  Outcome: Progressing  Goal: Maintains/Returns to pre admission functional level  Description: INTERVENTIONS:  - Perform BMAT or MOVE assessment daily    - Set and communicate daily mobility goal to care team and patient/family/caregiver     - Collaborate with rehabilitation services on mobility goals if consulted  - Perform Range of Motion 3 times a day  - Reposition patient every 2 hours    - Dangle patient 3 times a day  - Stand patient 3 times a day  - Ambulate patient 3 times a day  - Out of bed to chair 3 times a day   - Out of bed for meals 3 times a day  - Out of bed for toileting  - Record patient progress and toleration of activity level   Outcome: Progressing

## 2022-01-13 NOTE — PLAN OF CARE
Problem: PHYSICAL THERAPY ADULT  Goal: Performs mobility at highest level of function for planned discharge setting  See evaluation for individualized goals  Description: Treatment/Interventions: Functional transfer training,LE strengthening/ROM,Therapeutic exercise,Elevations,Patient/family training,Bed mobility,Gait training,Spoke to nursing,OT          See flowsheet documentation for full assessment, interventions and recommendations  Note: Prognosis: Good  Problem List: Decreased strength,Decreased endurance,Impaired balance,Decreased mobility,Pain,Impaired sensation  Assessment: Pt is a 42year old female who presents with sepsis on 1/7/22 at [facility]  PT orders received for evaluation and treat  Pt has a PMH of bilateral lung transplant, DM due to chronic kidney disease, pancreatic insuffiencey, asthma, chronic pain disorder, DVT, acute on chronic anemia, long term current use of systematic steroids, chronic kidney disease, severe protein calorie malnutrition, cystic fibrosis, neuropathic pain of R foot, and gram-positive cocci bacteremia  Prior to IE the pt was independent with ADLs and functional mobility  The required assistance with her IADLs from her family  Pt reports ambulating with a RW since October of 2021  Prior to that, pt ambulated without an AD  The pt lives in a 2-story home with 5 [address] with a handrail and a full flight of stairs to her basement  At IE the patient presents with the following impairments including; increased pain, decreased LE strength, decreased endurance, decreased mobility, impaired balance, impaired sensation, gait deviations, decreased activity tolerance, and increased fall risk  The pt presents with increased difficulty when performing sit to stand transfers and ambulation  The pt requires verbal cues and supervision for transfers and ambulation in order to ensure patient safety   The pt reports hypersensitivity on her R foot and increased pain with WB of her R LE  Pt prefers to keep NWB on her R LE, despite no weight bearing restrictions  The pt scores a 20 on the AM-PAC and a 60/100 on the Barthel index  The patient presents as an unstable-unpredictable complexity case due to above impairments and need for continued medical management  PT recommendation at time of d/c is for home with home health PT pending progress to help pt return to PLOF  Barriers to Discharge: Inaccessible home environment        PT Discharge Recommendation: Home with home health rehabilitation          See flowsheet documentation for full assessment

## 2022-01-13 NOTE — PLAN OF CARE
Problem: OCCUPATIONAL THERAPY ADULT  Goal: Performs self-care activities at highest level of function for planned discharge setting  See evaluation for individualized goals  Description: Treatment Interventions: ADL retraining,Functional transfer training,Endurance training,Patient/family training,Compensatory technique education,Cardiac education,Energy conservation,Activityengagement          See flowsheet documentation for full assessment, interventions and recommendations  Note: Limitation: Decreased ADL status,Decreased Safe judgement during ADL,Decreased endurance,Decreased self-care trans,Decreased high-level ADLs  Prognosis: Good  Assessment:  Patient is a 42 y o  female seen for OT evaluation s/p admit to [address] [facility] on 1/7/2022 w/[facility]epsis [facility])  Commorbidities affecting patient's functional performance at time of assessment include: Bilateral lung transplant, pancreatic insufficiency, cystic fibrosis, severe protein - calorie malnutrition, Anxiety, DVT, Chronic Pain disorder, Asthma, DM,  CKD, DM, presented to ED with fevers  Orders placed for OT evaluation and treatment  Performed at least two patient identifiers during session including name and wristband  Prior to admission, Patient lives in a 2 story house with 5 EL[address]IE, and full flight to basement  Patient needs assistance with transfers to/ from tub, is independnet with basic self care and requires assistance with IADLs  Patient does not drive and stays indoor most of the time  Personal factors affecting patient at time of initial evaluation include: steps to enter, difficulty performing ADLs and difficulty performing IADLs   Upon evaluation, patient requires supervision and set up assist for UB ADLs, minimal  assist for LB ADLs,   Occupational performance is affected by the following deficits: degenerative arthritic joint changes, impaired gross motor coordination, decreased activity tolerance, increased pain and orthopedic restrictions  Patient to benefit from continued Occupational Therapy treatment while in the hospital to address deficits as defined above and maximize level of functional independence with ADLs and functional mobility  Occupational Performance areas to address include: transfer to all surfaces, functional ambulation, functional mobility, health maintenance, IADLs: safety procedures, IADLs: meal prep/ clean up and Leisure Participation  From OT standpoint, recommendation at time of d/c would be Home with family support, [address] and Home OT         OT Discharge Recommendation: Home with home health rehabilitation

## 2022-01-13 NOTE — PROGRESS NOTES
[address]  Progress Note Tu Siddiqui 1975, 42 y o  female MRN: [id number]  Unit/Bed#: -01 Encounter: [id number]  Primary Care Provider: Glenny Dunn MD   Date and time admitted to hospital: 1/7/2022  8:07 AM    Neuropathic pain of right foot  Assessment & Plan  - neurology following: increasing Trileptal to 300 mg twice a day and tapering Lyrica to 75 mg at bedtime  Gram-positive cocci bacteremia  Assessment & Plan  - staph pseudintermedius/schleiferi  - 2/2 sets positive for Gram-positive cocci on admission  - source is not entirely clear at this time imaging of the chest and abdomen/pelvis was unrevealing  She has no open skin lesions  No recent trauma  MRI of the thoracic spine where she has had some pain was unrevealing  - status post port removal on 01/10  Catheter tip no growth    - continue IV daptomycin  Will premedicate with benadryl prior to daptomycin due to itching  -  cefazolin DCd per ID recommendations  Repeat blood cultures negative at 72 hours  Will place PICC line  - port to be replaced at a later date after antibiotic course completed  - monitor WBC count and fever curve  - deferred DEJUAN for now      Cystic fibrosis [facility])  Assessment & Plan  - History noted  - s/p lung transplant    Severe protein-calorie malnutrition ([address] )  Assessment & Plan  - nutrition evaluation    Chronic kidney disease  Assessment & Plan  - renal function at baseline; continue to monitor    Long term current use of systemic steroids  Assessment & Plan  - on long-term systemic steroids due to history of lung transplant  - currently on IV hydrocortisone given current sepsis/infection    Acute on chronic anemia  Assessment & Plan  - no active signs or symptoms of bleeding  - patient denies any hematochezia, melena, or hematemesis  Denies any hematuria  Has not had menses for few months    - Hb stable at 9 5  - monitor clinically for signs or symptoms of bleeding  - transfuses 1 unit irradiated PRBCs 1/9    DVT (deep venous thrombosis) ([facility])  Assessment & Plan  - stable  - Continue Eliquis    Chronic pain disorder  Assessment & Plan  - Continue home meds    Asthma  Assessment & Plan  - Continue duo nebs as needed    Pancreatic insufficiency  Assessment & Plan  - no abd pain  - history of cystic fibrosis  - Continue pancrelipase    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, with long-term current use of insulin ([facility])  Assessment & Plan  - last hemoglobin A1c on record in November 2021 showed hemoglobin A1c of 6 1%  - monitor on ISS for now    Lung transplant status, bilateral ([facility] )  Assessment & Plan  - history of lung transplant secondary to cystic fibrosis  - follows with [facility]  - history of pneumonia with Pseudomonas    - continue home tacrolimus, stress dose steroids, for now  - continue prophylactic atovaquone and valacyclovir    * Sepsis ([facility] )  Assessment & Plan  - repeat Blood cultures: no growth 24 hours, will wait for 72 hrs till a new line is obtained  Pt says she cannot get a PICC line  IR will be consulted for other options  - met criteria on admission with tachycardia, tachypnea, fever, hypotension, and now with Gram-positive bacteremia  - treatment plan as above      VTE Pharmacologic Prophylaxis:   Pharmacologic: Apixaban (Eliquis)  Mechanical VTE Prophylaxis in Place: Yes    Patient Centered Rounds: I have performed bedside rounds with nursing staff today  Discussions with Specialists or Other Care Team Provider:  Cm, nursing    Education and Discussions with Family / Patient: pt    Time Spent for Care: 30 minutes  More than 50% of total time spent on counseling and coordination of care as described above      Current Length of Stay: 6 day(s)    Current Patient Status: Inpatient   Certification Statement: The patient will continue to require additional inpatient hospital stay due to See below    Discharge Plan:  Pending PICC line placement and home antibiotics     Code Status: Level 1 - Full Code      Subjective:   No acute compaints    Objective:     Vitals:   Temp (24hrs), Av 9 °F (36 6 °C), Min:97 8 °F (36 6 °C), Max:98 1 °F (36 7 °C)    Temp:  [97 8 °F (36 6 °C)-98 1 °F (36 7 °C)] 97 8 °F (36 6 °C)  HR:  [72-83] 74  Resp:  [16-20] 17  BP: (111-139)/(75-85) 139/85  SpO2:  [97 %-99 %] 97 %  Body mass index is 17 56 kg/m²  Input and Output Summary (last 24 hours): Intake/Output Summary (Last 24 hours) at 2022 0917  Last data filed at 2022 0230  Gross per 24 hour   Intake 120 ml   Output 400 ml   Net -280 ml       Physical Exam:     Physical Exam  Constitutional:       General: She is not in acute distress  Appearance: She is well-developed  She is not diaphoretic  HENT:      Head: Normocephalic and atraumatic  Nose: Nose normal       Mouth/Throat:      Pharynx: No oropharyngeal exudate  Eyes:      General: No scleral icterus  Right eye: No discharge  Left eye: No discharge  Conjunctiva/sclera: Conjunctivae normal    Neck:      Thyroid: No thyromegaly  Vascular: No JVD  Cardiovascular:      Rate and Rhythm: Normal rate and regular rhythm  Heart sounds: Normal heart sounds  No murmur heard  No friction rub  No gallop  Pulmonary:      Effort: Pulmonary effort is normal  No respiratory distress  Breath sounds: Normal breath sounds  No wheezing or rales  Chest:      Chest wall: No tenderness  Abdominal:      General: Bowel sounds are normal  There is no distension  Palpations: Abdomen is soft  Tenderness: There is no abdominal tenderness  There is no guarding or rebound  Musculoskeletal:         General: No tenderness or deformity  Normal range of motion  Cervical back: Normal range of motion and neck supple  Skin:     General: Skin is warm and dry  Findings: No erythema or rash  Neurological:      Mental Status: She is alert   Mental status is at baseline  Cranial Nerves: No cranial nerve deficit  Sensory: No sensory deficit  Motor: No abnormal muscle tone  Coordination: Coordination normal        (  Additional Data:     Labs:    Results from last 7 days   Lab Units 01/13/22  0552   WBC Thousand/uL 5 79   HEMOGLOBIN g/dL 9 6*   HEMATOCRIT % 32 8*   PLATELETS Thousands/uL 233   NEUTROS PCT % 56   LYMPHS PCT % 37   MONOS PCT % 7   EOS PCT % 0     Results from last 7 days   Lab Units 01/13/22  0552 01/11/22  0508 01/10/22  0432   SODIUM mmol/L 136   < > 138   POTASSIUM mmol/L 3 6   < > 4 0   CHLORIDE mmol/L 99*   < > 102   CO2 mmol/L 31   < > 27   BUN mg/dL 31*   < > 19   CREATININE mg/dL 0 81   < > 1 06   ANION GAP mmol/L 6   < > 9   CALCIUM mg/dL 8 7   < > 8 6   ALBUMIN g/dL  --   --  2 5*   TOTAL BILIRUBIN mg/dL  --   --  0 26   ALK PHOS U/L  --   --  189*   ALT U/L  --   --  48   AST U/L  --   --  21   GLUCOSE RANDOM mg/dL 210*   < > 250*    < > = values in this interval not displayed  Results from last 7 days   Lab Units 01/07/22  0921   INR  1 07     Results from last 7 days   Lab Units 01/13/22  0619 01/13/22  0013 01/12/22  2102 01/12/22  1616 01/12/22  1040 01/12/22  4185 01/12/22  0122 01/11/22  2155 01/11/22  1636 01/11/22  1110 01/11/22  0656 01/10/22  2341   POC GLUCOSE mg/dl 193* 246* 403* 395* 303* 220* 195* 388* 375* 232* 191* 213*         Results from last 7 days   Lab Units 01/08/22  1113 01/07/22  0921   LACTIC ACID mmol/L  --  1 6   PROCALCITONIN ng/ml 0 24 0 21           * I Have Reviewed All Lab Data Listed Above  * Additional Pertinent Lab Tests Reviewed: All Labs Within Last 24 Hours Reviewed    Imaging:    Imaging Reports Reviewed Today Include: na  Imaging Personally Reviewed by Myself Includes:  na    Recent Cultures (last 7 days):     Results from last 7 days   Lab Units 01/09/22  0524 01/07/22  0921   BLOOD CULTURE  No Growth at 72 hrs  No Growth at 72 hrs   Staphylococcus schleiferi* Staphylococcus pseudintermedius*  Staphylococcus schleiferi*  Staphylococcus pseudintermedius*   GRAM STAIN RESULT   --  Gram positive cocci in clusters*  Gram positive cocci in clusters*       Last 24 Hours Medication List:   Current Facility-Administered Medications   Medication Dose Route Frequency Provider Last Rate    acetaminophen  650 mg Oral Q4H PRN Noah Bernal MD      ALPRAZolam  1 mg Oral BID Noha Bernal MD      aluminum-magnesium hydroxide-simethicone  30 mL Oral Q4H PRN WAYNE Rueda      apixaban  5 mg Oral BID Noah Bernal MD      atovaquone  1,500 mg Oral Daily Noah Bernal MD      DAPTOmycin  8 mg/kg Intravenous Q24H Ritchie Khan  mg (01/12/22 1222)    diphenhydrAMINE  25 mg Intravenous Q6H PRN Charowendy Iveyinic, DO      hydrocortisone sodium succinate  50 mg Intravenous [facility] Noah Bernal MD      hydrOXYzine HCL  50 mg Oral Q6H PRN WAYNE Rueda      insulin lispro  1-5 Units Subcutaneous 4x Daily (AC & HS) WAYNE Rueda      lidocaine  2 patch Topical Daily Noah Bernal MD      OXcarbazepine  300 mg Oral Q12H [address] [place], WAYNE      oxyCODONE  10 mg Oral Q4H PRN Charo Ovens Starsinic, DO      oxyCODONE  5 mg Oral Q6H PRN Charo Ovens Starsinic, DO      pancrelipase (Lip-Prot-Amyl)  48,000 Units Oral TID With Meals Charo Gunderson, DO      pantoprazole  40 mg Oral Early Morning Noah Bernal MD      polyethylene glycol  17 g Oral BID PRN Noah Bernal MD      polyethylene glycol  17 g Oral Daily Sai Serrano MD      senna-docusate sodium  1 tablet Oral HS Sai Serrano MD      sodium chloride  4 mL Nebulization BID Charo Gunderson, DO      tacrolimus  3 mg Oral BID Noah Bernal MD      traZODone  25 mg Oral HS Noah Bernal MD      valACYclovir  500 mg Oral Q24H Ritchie Khan MD          Today, Patient Was Seen By: Caridad Ayala MD    ** Please Note: Dictation voice to text software may have been used in the creation of this document   **

## 2022-01-13 NOTE — PLAN OF CARE
Problem: Potential for Falls  Goal: Patient will remain free of falls  Description: INTERVENTIONS:  - Educate patient/family on patient safety including physical limitations  - Instruct patient to call for assistance with activity   - Consult OT/PT to assist with strengthening/mobility   - Keep Call bell within reach  - Keep bed low and locked with side rails adjusted as appropriate  - Keep care items and personal belongings within reach  - Initiate and maintain comfort rounds  - Make Fall Risk Sign visible to staff  - Offer Toileting every 2 Hours, in advance of need  - Initiate/Maintain alarm  - Obtain necessary fall risk management equipment  - Apply yellow socks and bracelet for high fall risk patients  - Consider moving patient to room near nurses station  Outcome: Progressing     Problem: MOBILITY - ADULT  Goal: Maintain or return to baseline ADL function  Description: INTERVENTIONS:  -  Assess patient's ability to carry out ADLs; assess patient's baseline for ADL function and identify physical deficits which impact ability to perform ADLs (bathing, care of mouth/teeth, toileting, grooming, dressing, etc )  - Assess/evaluate cause of self-care deficits   - Assess range of motion  - Assess patient's mobility; develop plan if impaired  - Assess patient's need for assistive devices and provide as appropriate  - Encourage maximum independence but intervene and supervise when necessary  - Involve family in performance of ADLs  - Assess for home care needs following discharge   - Consider OT consult to assist with ADL evaluation and planning for discharge  - Provide patient education as appropriate  Outcome: Progressing  Goal: Maintains/Returns to pre admission functional level  Description: INTERVENTIONS:  - Perform BMAT or MOVE assessment daily    - Set and communicate daily mobility goal to care team and patient/family/caregiver     - Collaborate with rehabilitation services on mobility goals if consulted  - Perform Range of Motion 3 times a day  - Reposition patient every 2 hours    - Dangle patient 3 times a day  - Stand patient 3 times a day  - Ambulate patient 3 times a day  - Out of bed to chair 3 times a day   - Out of bed for meals 3 times a day  - Out of bed for toileting  - Record patient progress and toleration of activity level   Outcome: Progressing     Problem: PAIN - ADULT  Goal: Verbalizes/displays adequate comfort level or baseline comfort level  Description: Interventions:  - Encourage patient to monitor pain and request assistance  - Assess pain using appropriate pain scale  - Administer analgesics based on type and severity of pain and evaluate response  - Implement non-pharmacological measures as appropriate and evaluate response  - Consider cultural and social influences on pain and pain management  - Notify physician/advanced practitioner if interventions unsuccessful or patient reports new pain  Outcome: Progressing     Problem: INFECTION - ADULT  Goal: Absence or prevention of progression during hospitalization  Description: INTERVENTIONS:  - Assess and monitor for signs and symptoms of infection  - Monitor lab/diagnostic results  - Monitor all insertion sites, i e  indwelling lines, tubes, and drains  - Monitor endotracheal if appropriate and nasal secretions for changes in amount and color  - [place] appropriate cooling/warming therapies per order  - Administer medications as ordered  - Instruct and encourage patient and family to use good hand hygiene technique  - Identify and instruct in appropriate isolation precautions for identified infection/condition  Outcome: Progressing  Goal: Absence of fever/infection during neutropenic period  Description: INTERVENTIONS:  - Monitor WBC    Outcome: Progressing     Problem: DISCHARGE PLANNING  Goal: Discharge to home or other facility with appropriate resources  Description: INTERVENTIONS:  - Identify barriers to discharge w/patient and caregiver  - Arrange for needed discharge resources and transportation as appropriate  - Identify discharge learning needs (meds, wound care, etc )  - Arrange for interpretive services to assist at discharge as needed  - Refer to Case Management Department for coordinating discharge planning if the patient needs post-hospital services based on physician/advanced practitioner order or complex needs related to functional status, cognitive ability, or social support system  Outcome: Progressing     Problem: Knowledge Deficit  Goal: Patient/family/caregiver demonstrates understanding of disease process, treatment plan, medications, and discharge instructions  Description: Complete learning assessment and assess knowledge base  Interventions:  - Provide teaching at level of understanding  - Provide teaching via preferred learning methods  Outcome: Progressing     Problem: RESPIRATORY - ADULT  Goal: Achieves optimal ventilation and oxygenation  Description: INTERVENTIONS:  - Assess for changes in respiratory status  - Assess for changes in mentation and behavior  - Position to facilitate oxygenation and minimize respiratory effort  - Oxygen administered by appropriate delivery if ordered  - Initiate smoking cessation education as indicated  - Encourage broncho-pulmonary hygiene including cough, deep breathe, Incentive Spirometry  - Assess the need for suctioning and aspirate as needed  - Assess and instruct to report SOB or any respiratory difficulty  - Respiratory Therapy support as indicated  Outcome: Progressing     Problem: Nutrition/Hydration-ADULT  Goal: Nutrient/Hydration intake appropriate for improving, restoring or maintaining nutritional needs  Description: Monitor and assess patient's nutrition/hydration status for malnutrition  Collaborate with interdisciplinary team and initiate plan and interventions as ordered  Monitor patient's weight and dietary intake as ordered or per policy   Utilize nutrition screening tool and intervene as necessary  Determine patient's food preferences and provide high-protein, high-caloric foods as appropriate       INTERVENTIONS:  - Monitor oral intake, urinary output, labs, and treatment plans  - Assess nutrition and hydration status and recommend course of action  - Evaluate amount of meals eaten  - Assist patient with eating if necessary   - Allow adequate time for meals  - Recommend/ encourage appropriate diets, oral nutritional supplements, and vitamin/mineral supplements  - Order, calculate, and assess calorie counts as needed  - Assess need for intravenous fluids  - Provide  nutrition/hydration education as appropriate  - Include patient/family/caregiver in decisions related to nutrition  Outcome: Progressing

## 2022-01-13 NOTE — DISCHARGE INSTRUCTIONS
Peripherally Inserted Central Catheter     WHAT YOU NEED TO KNOW:   A PICC is an IV placed into a large blood vessel near your heart  It is usually inserted through a blood vessel in your arm  Your PICC may have multiple ports  Ports are tubes where you can inject medicine  A PICC can stay in place for several weeks or months  You may need a PICC to get nutrition, medicine, or fluids  Blood samples can be removed from your PICC and sent to the lab for tests  DISCHARGE INSTRUCTIONS:    [address] A[address] and Zack Vogt patients,    Contact Interventional Radiology at [phone] PATIENTS: Contact Interventional Radiology at [phone]   [facility] PATIENTS: Contact Interventional Radiology at [phone] if:  · Blood soaks through your bandage  · Your arm or leg feels warm, tender, and painful  It may look swollen and red  · You have trouble moving your arm  · Your catheter falls out  · You have a fever or swelling, redness, pain, or pus where the catheter was inserted  · Persistent nausea or vomiting  · You cannot flush your catheter, or you feel pain when you flush your catheter  · You see a hole or crack in the tubing of your catheter  · You see fluid leaking from the insertion site  · You run out of supplies to care for your catheter  · You have questions or concerns about your condition or care  Implanted Venous Access Port Removal    WHAT YOU NEED TO KNOW:   An implanted venous access port is a device used to give treatments and take blood  It may also be called a central venous access device (CVAD)  The port is a small container that is placed under your skin, usually in your upper chest  A port can also be placed in your arm or abdomen  The port is attached to a catheter that enters a large vein  DISCHARGE INSTRUCTIONS:   Resume your normal diet  Small sips of flat soda will help with mild nausea  Prevent an infection:     Wash your hands often    Use soap and water  Clean your hands before and  after you care for your incision  Check your skin for infection every day  Look for redness, swelling, or fluid oozing from the incision site  Dressing may come off in 24 hours  Medical glue will peel off on its own in 5 to 10 days  You may shower 24 hours after procedure  Follow up with your healthcare provider as directed  Write down your questions so you remember to ask them during your visits  Activity:  You may return to your daily activities when the area heals  Contact Interventional Radiology at [phone] [place] PATIENTS: Contact Interventional Radiology at [phone]) Greta Reyna PATIENTS: Contact Interventional Radiology at [phone]) if:     You have a fever  You have persistent nausea  Your inciscion site is red, swollen, or draining pus  You have questions or concerns about your condition or care  Seek care immediately or call 911 if:  Blood soaks through your bandage  The skin over or around your incision breaks open  Your heart is jumping or fluttering  You have a headache, blurred vision, and feel confused  You have pain in your arm, neck, shoulder, or chest     You have trouble breathing that is getting worse over time

## 2022-01-13 NOTE — PROGRESS NOTES
Progress Note - Infectious Disease   Moreno Fortune 42 y o  female MRN: [id number]  Unit/Bed#: -01 Encounter: [id number]    Impression/Recommendations:  1  Sepsis, POA:  Fever, leukopenia in the setting of Staph bacteremia  Temperature curve improving, WBC has normalized  · Antibiotic therapy as below  · Monitor clinical response, temperature curve  · Supportive care per primary service  2  Staph pseudintermedius, Staph schleiferi bacteremia:  Suspected port infection  Presumed zoonotic infection as patient has three pet dogs at home  Port was removed on Jan 10th; catheter tip cx are negative  Central venous catheter placed given patient's poor venous access  Temperature curve improving  1/9 2D echocardiogram limited study with aortic and pulmonic pulmonic valves not well visualized  1/9 repeat blood cx are negative to date  · Defer DEJUAN for now unless fevers rebound or bacteremia persists  · Continue daptomycin IV, verified with microbiology lab that both Staph species are susceptible to daptomycin  · Anticipate 2 week duration of IV antibiotic therapy from time of port removal until Jan 24th  Rx provided to case management team   · Weekly labs while on daptomycin:  CBC with diff, creatinine, LFTs, CK  · IR to remove tunneled PICC line upon completion of IV ABx therapy    3  Cystic fibrosis:  Status post bilateral lung transplant 2013 and left lung retransplant 2017  Immunocompromised patient  Patient has had multiple infections in the past and 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  She follows with [facility] transplant  · Continues chronic immunosuppression with tacrolimus and prednisone  · Continue prophylaxis with atovaquone and Valtrex  · CMV monitoring as outpatient  4  Malnutrition and diabetes mellitus  · Supportive care as per primary     My recommendations were discussed with the patient who verbalized understanding and RN   The ID service will follow      Antibiotics: daptomycin since Jan 8th  Scheduled Meds:  Current Facility-Administered Medications   Medication Dose Route Frequency Provider Last Rate    acetaminophen  650 mg Oral Q4H PRN Nicole Queen MD      ALPRAZolam  1 mg Oral BID Nicole Queen MD      aluminum-magnesium hydroxide-simethicone  30 mL Oral Q4H PRN Omega Crank, CRNP      apixaban  5 mg Oral BID Nicole Queen MD      atovaquone  1,500 mg Oral Daily Nicole Queen MD      DAPTOmycin  8 mg/kg Intravenous Q24H Bashir Acosta  mg (01/13/22 1402)    diphenhydrAMINE  25 mg Intravenous Q6H PRN Steven Gunderson, DO      hydrocortisone sodium succinate  50 mg Intravenous [facility] Nicole Queen MD      hydrOXYzine HCL  50 mg Oral Q6H PRN Omega Crank, CRNP      insulin lispro  1-5 Units Subcutaneous 4x Daily (AC & HS) Omega Crank, CRNP      lidocaine  2 patch Topical Daily Nicole Queen MD      OXcarbazepine  300 mg Oral Q12H [address] An Jeramie, CRNP      oxyCODONE  10 mg Oral Q4H PRN Steven Iveyinic, DO      oxyCODONE  5 mg Oral Q6H PRN Steven Gunderson, DO      pancrelipase (Lip-Prot-Amyl)  48,000 Units Oral TID With Meals Steven Gunderson, DO      pantoprazole  40 mg Oral Early Morning Nicole Queen MD      polyethylene glycol  17 g Oral BID PRN Nicole Queen MD      polyethylene glycol  17 g Oral Daily Sai Serrano MD      senna-docusate sodium  1 tablet Oral HS Sai Serrano MD      sodium chloride  4 mL Nebulization BID Steven Gunderson, DO      tacrolimus  3 mg Oral BID Nicole Queen MD      traZODone  25 mg Oral HS Nicole Queen MD      valACYclovir  500 mg Oral Q24H Bashir Acosta MD       Continuous Infusions:   PRN Meds:   acetaminophen    aluminum-magnesium hydroxide-simethicone    diphenhydrAMINE    hydrOXYzine HCL    oxyCODONE    oxyCODONE    polyethylene glycol    Subjective:  Patient underwent tunneled PICC line insertion this afternoon  She denies fever, chills, rash, vomiting, diarrhea, shortness of breath  No pain is reported      Objective:  Vitals:  Temp:  [97 8 °F (36 6 °C)-98 1 °F (36 7 °C)] 98 °F (36 7 °C)  HR:  [69-83] 69  Resp:  [16-20] 16  BP: (131-139)/(85-87) 131/87  SpO2:  [97 %-99 %] 97 %  Temp (24hrs), Av °F (36 7 °C), Min:97 8 °F (36 6 °C), Max:98 1 °F (36 7 °C)  Current: Temperature: 98 °F (36 7 °C)  Physical Exam:   General Appearance:  Alert, awake, nontoxic, no acute distress   ENT: Oropharynx moist without lesions   Lungs:   Clear to auscultation bilaterally; respirations unlabored   Heart:  S1, S2, RRR, no murmur   Abdomen:   Soft, non-tender, non-distended   : No Kim catheter present   Extremities: No distal leg edema b/l   Neurologic: AAO to person, surroundings, conversant, fluent speech   Skin: RT ACW tunneled PICC line intact, no rash     Labs, Cultures, Imaging, & Other studies:   All pertinent labs, cultures and imaging studies were personally reviewed  Results from last 7 days   Lab Units 22  0552 22  0629 22  0508   WBC Thousand/uL 5 79 4 57 4 54   HEMOGLOBIN g/dL 9 6* 10 9* 9 5*   PLATELETS Thousands/uL 233 223 201     Results from last 7 days   Lab Units 22  0552 22  0629 22  0629 22  0508 22  0508 01/10/22  0432 01/10/22  0432 22  0538 22  1113 22  0921 22  0921   SODIUM mmol/L 136  --  137  --  139   < > 138   < > 133*   < > 140   POTASSIUM mmol/L 3 6   < > 3 9   < > 3 6   < > 4 0   < > 4 8   < > 3 8   CHLORIDE mmol/L 99*   < > 99*   < > 101   < > 102   < > 99*   < > 103   CO2 mmol/L 31   < > 30   < > 30   < > 27   < > 25   < > 29   BUN mg/dL 31*   < > 26*   < > 24   < > 19   < > 20   < > 22   CREATININE mg/dL 0 81   < > 0 89   < > 0 90   < > 1 06   < > 1 18   < > 1 10   EGFR ml/min/1 73sq m 88   < > 79   < > 78   < > 64   < > 56   < > 61   CALCIUM mg/dL 8 7   < > 9 3   < > 8 7   < > 8 6   < > 7 5*   < > 7 9*   AST U/L  --   --   --   --   --   --  21  --  42  --  59*   ALT U/L  --   --   --   --   --   --  48  --  105*   < > 160*   ALK PHOS U/L  --   --   --   --   --   --  189*  --  228*   < > 283*    < > = values in this interval not displayed  Results from last 7 days   Lab Units 01/09/22  0524 01/07/22  0921   BLOOD CULTURE  No Growth at 72 hrs  No Growth at 72 hrs   Staphylococcus schleiferi*  Staphylococcus pseudintermedius*  Staphylococcus schleiferi*  Staphylococcus pseudintermedius*   GRAM STAIN RESULT   --  Gram positive cocci in clusters*  Gram positive cocci in clusters*     Results from last 7 days   Lab Units 01/08/22  1113 01/07/22  0921   PROCALCITONIN ng/ml 0 24 0 21         Results from last 7 days   Lab Units 01/09/22  0538   FERRITIN ng/mL 12

## 2022-01-14 VITALS
OXYGEN SATURATION: 98 % | TEMPERATURE: 98.1 F | RESPIRATION RATE: 16 BRPM | SYSTOLIC BLOOD PRESSURE: 151 MMHG | HEART RATE: 73 BPM | WEIGHT: 96 LBS | HEIGHT: 62 IN | DIASTOLIC BLOOD PRESSURE: 101 MMHG | BODY MASS INDEX: 17.66 KG/M2

## 2022-01-14 LAB
ANION GAP SERPL CALCULATED.3IONS-SCNC: 9 MMOL/L (ref 4–13)
BACTERIA BLD CULT: NORMAL
BACTERIA BLD CULT: NORMAL
BASOPHILS # BLD AUTO: 0.01 THOUSANDS/ΜL (ref 0–0.1)
BASOPHILS NFR BLD AUTO: 0 % (ref 0–1)
BUN SERPL-MCNC: 34 MG/DL (ref 5–25)
CALCIUM SERPL-MCNC: 8.8 MG/DL (ref 8.3–10.1)
CHLORIDE SERPL-SCNC: 98 MMOL/L (ref 100–108)
CO2 SERPL-SCNC: 28 MMOL/L (ref 21–32)
CREAT SERPL-MCNC: 0.9 MG/DL (ref 0.6–1.3)
EOSINOPHIL # BLD AUTO: 0.06 THOUSAND/ΜL (ref 0–0.61)
EOSINOPHIL NFR BLD AUTO: 1 % (ref 0–6)
ERYTHROCYTE [DISTWIDTH] IN BLOOD BY AUTOMATED COUNT: 16.7 % (ref 11.6–15.1)
GFR SERPL CREATININE-BSD FRML MDRD: 78 ML/MIN/1.73SQ M
GLUCOSE SERPL-MCNC: 170 MG/DL (ref 65–140)
GLUCOSE SERPL-MCNC: 217 MG/DL (ref 65–140)
GLUCOSE SERPL-MCNC: 229 MG/DL (ref 65–140)
GLUCOSE SERPL-MCNC: 246 MG/DL (ref 65–140)
HCT VFR BLD AUTO: 34.8 % (ref 34.8–46.1)
HGB BLD-MCNC: 10 G/DL (ref 11.5–15.4)
IMM GRANULOCYTES # BLD AUTO: 0.03 THOUSAND/UL (ref 0–0.2)
IMM GRANULOCYTES NFR BLD AUTO: 1 % (ref 0–2)
LYMPHOCYTES # BLD AUTO: 2.12 THOUSANDS/ΜL (ref 0.6–4.47)
LYMPHOCYTES NFR BLD AUTO: 32 % (ref 14–44)
MCH RBC QN AUTO: 23.4 PG (ref 26.8–34.3)
MCHC RBC AUTO-ENTMCNC: 28.7 G/DL (ref 31.4–37.4)
MCV RBC AUTO: 82 FL (ref 82–98)
MONOCYTES # BLD AUTO: 0.49 THOUSAND/ΜL (ref 0.17–1.22)
MONOCYTES NFR BLD AUTO: 8 % (ref 4–12)
NEUTROPHILS # BLD AUTO: 3.86 THOUSANDS/ΜL (ref 1.85–7.62)
NEUTS SEG NFR BLD AUTO: 58 % (ref 43–75)
NRBC BLD AUTO-RTO: 0 /100 WBCS
PLATELET # BLD AUTO: 254 THOUSANDS/UL (ref 149–390)
PMV BLD AUTO: 10.9 FL (ref 8.9–12.7)
POTASSIUM SERPL-SCNC: 3.9 MMOL/L (ref 3.5–5.3)
RBC # BLD AUTO: 4.27 MILLION/UL (ref 3.81–5.12)
SODIUM SERPL-SCNC: 135 MMOL/L (ref 136–145)
WBC # BLD AUTO: 6.57 THOUSAND/UL (ref 4.31–10.16)

## 2022-01-14 PROCEDURE — 82948 REAGENT STRIP/BLOOD GLUCOSE: CPT

## 2022-01-14 PROCEDURE — 99239 HOSP IP/OBS DSCHRG MGMT >30: CPT | Performed by: INTERNAL MEDICINE

## 2022-01-14 PROCEDURE — 80048 BASIC METABOLIC PNL TOTAL CA: CPT | Performed by: INTERNAL MEDICINE

## 2022-01-14 PROCEDURE — 99232 SBSQ HOSP IP/OBS MODERATE 35: CPT | Performed by: INTERNAL MEDICINE

## 2022-01-14 PROCEDURE — 85025 COMPLETE CBC W/AUTO DIFF WBC: CPT | Performed by: INTERNAL MEDICINE

## 2022-01-14 RX ORDER — OXCARBAZEPINE 300 MG/1
300 TABLET, FILM COATED ORAL EVERY 12 HOURS SCHEDULED
Qty: 60 TABLET | Refills: 0 | Status: SHIPPED | OUTPATIENT
Start: 2022-01-14 | End: 2022-02-11 | Stop reason: SDDI

## 2022-01-14 RX ORDER — HYDROMORPHONE HCL/PF 1 MG/ML
0.5 SYRINGE (ML) INJECTION ONCE
Status: COMPLETED | OUTPATIENT
Start: 2022-01-14 | End: 2022-01-14

## 2022-01-14 RX ADMIN — LIDOCAINE 5% 2 PATCH: 700 PATCH TOPICAL at 09:25

## 2022-01-14 RX ADMIN — PANTOPRAZOLE SODIUM 40 MG: 40 TABLET, DELAYED RELEASE ORAL at 05:22

## 2022-01-14 RX ADMIN — OXYCODONE HYDROCHLORIDE 10 MG: 10 TABLET ORAL at 09:36

## 2022-01-14 RX ADMIN — SODIUM CHLORIDE SOLN NEBU 3% 4 ML: 3 NEBU SOLN at 09:01

## 2022-01-14 RX ADMIN — POLYETHYLENE GLYCOL 3350 17 G: 17 POWDER, FOR SOLUTION ORAL at 09:25

## 2022-01-14 RX ADMIN — HYDROCORTISONE SODIUM SUCCINATE 50 MG: 100 INJECTION, POWDER, FOR SOLUTION INTRAMUSCULAR; INTRAVENOUS at 05:22

## 2022-01-14 RX ADMIN — PANCRELIPASE 48000 UNITS: 24000; 76000; 120000 CAPSULE, DELAYED RELEASE PELLETS ORAL at 12:05

## 2022-01-14 RX ADMIN — OXYCODONE HYDROCHLORIDE 10 MG: 10 TABLET ORAL at 05:22

## 2022-01-14 RX ADMIN — HYDROMORPHONE HYDROCHLORIDE 0.5 MG: 1 INJECTION, SOLUTION INTRAMUSCULAR; INTRAVENOUS; SUBCUTANEOUS at 11:15

## 2022-01-14 RX ADMIN — ALPRAZOLAM 1 MG: 0.5 TABLET ORAL at 09:25

## 2022-01-14 RX ADMIN — DIPHENHYDRAMINE HYDROCHLORIDE 25 MG: 50 INJECTION, SOLUTION INTRAMUSCULAR; INTRAVENOUS at 12:09

## 2022-01-14 RX ADMIN — ATOVAQUONE 1500 MG: 750 SUSPENSION ORAL at 09:25

## 2022-01-14 RX ADMIN — VALACYCLOVIR HYDROCHLORIDE 500 MG: 500 TABLET, FILM COATED ORAL at 12:16

## 2022-01-14 RX ADMIN — DAPTOMYCIN 350 MG: 500 INJECTION, POWDER, LYOPHILIZED, FOR SOLUTION INTRAVENOUS at 12:05

## 2022-01-14 RX ADMIN — ALUMINUM HYDROXIDE, MAGNESIUM HYDROXIDE, AND SIMETHICONE 30 ML: 200; 200; 20 SUSPENSION ORAL at 09:24

## 2022-01-14 RX ADMIN — APIXABAN 5 MG: 5 TABLET, FILM COATED ORAL at 09:25

## 2022-01-14 RX ADMIN — PANCRELIPASE 48000 UNITS: 24000; 76000; 120000 CAPSULE, DELAYED RELEASE PELLETS ORAL at 09:24

## 2022-01-14 RX ADMIN — OXCARBAZEPINE 300 MG: 150 TABLET, FILM COATED ORAL at 09:25

## 2022-01-14 RX ADMIN — TACROLIMUS 3 MG: 0.5 CAPSULE ORAL at 09:24

## 2022-01-14 NOTE — ASSESSMENT & PLAN NOTE
- staph pseudintermedius/schleiferi  - 2/2 sets positive for Gram-positive cocci on admission  - source is not entirely clear at this time imaging of the chest and abdomen/pelvis was unrevealing  She has no open skin lesions  No recent trauma  MRI of the thoracic spine where she has had some pain was unrevealing  - status post port removal on 01/10  Catheter tip no growth    - continue IV daptomycin     Status post PICC line  Will complete antibiotics January 24th

## 2022-01-14 NOTE — DISCHARGE SUMMARY
[facility]  Discharge- Salvatore Wood 1975, 42 y o  female MRN: [id number]  Unit/Bed#: -01 Encounter: [id number]  Primary Care Provider: Stephanie Byers MD   Date and time admitted to hospital: 1/7/2022  8:07 AM    Neuropathic pain of right foot  Assessment & Plan  - neurology following: increasing Trileptal to 300 mg twice a day and tapering Lyrica to 75 mg at bedtime  Gram-positive cocci bacteremia  Assessment & Plan  - staph pseudintermedius/schleiferi  - 2/2 sets positive for Gram-positive cocci on admission  - source is not entirely clear at this time imaging of the chest and abdomen/pelvis was unrevealing  She has no open skin lesions  No recent trauma  MRI of the thoracic spine where she has had some pain was unrevealing  - status post port removal on 01/10  Catheter tip no growth    - continue IV daptomycin  Status post PICC line  Will complete antibiotics January 24th    [facility] fibrosis [facility])  Assessment & Plan  - History noted  - s/p lung transplant    Severe protein-calorie malnutrition ([facility] [address] )  Assessment & Plan  - nutrition evaluation    Chronic kidney disease  Assessment & Plan  - renal function at baseline; continue to monitor    Long term current use of systemic steroids  Assessment & Plan  - on long-term systemic steroids due to history of lung transplant  - currently on IV hydrocortisone given current sepsis/infection    Acute on chronic anemia  Assessment & Plan  - no active signs or symptoms of bleeding  - patient denies any hematochezia, melena, or hematemesis  Denies any hematuria  Has not had menses for few months    - Hb stable at 9 5  - monitor clinically for signs or symptoms of bleeding  - transfuses 1 unit irradiated PRBCs 1/9    DVT (deep venous thrombosis) ([facility])  Assessment & Plan  - stable  - Continue Eliquis    Chronic pain disorder  Assessment & Plan  - Continue home meds    Asthma  Assessment & Plan  - Continue duo nebs as needed    Pancreatic insufficiency  Assessment & Plan  - no abd pain  - history of cystic fibrosis  - Continue pancrelipase    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, with long-term current use of insulin ([facility] )  Assessment & Plan  - last hemoglobin A1c on record in November 2021 showed hemoglobin A1c of 6 1%  - monitor on ISS for now    [facility], [facility]  Assessment & Plan  - history of lung transplant secondary to cystic fibrosis  - follows with [facility]  - history of pneumonia with Pseudomonas    - continue home tacrolimus, stress dose steroids, for now  - continue prophylactic atovaquone and valacyclovir    * Sepsis ([facility] )  Assessment & Plan  - repeat Blood cultures: no growth 24 hours, will wait for 72 hrs till a new line is obtained  Pt says she cannot get a PICC line  IR will be consulted for other options  - met criteria on admission with tachycardia, tachypnea, fever, hypotension, and now with Gram-positive bacteremia  - treatment plan as above      Discharging Physician / Practitioner: Nandini Rayo MD  PCP: Rhys Perez MD  Admission Date:   Admission Orders (From admission, onward)     Ordered        01/07/22 1727  Inpatient Admission  Once                      Discharge Date: 01/14/22    Medical Problems             Resolved Problems  Date Reviewed: 1/13/2022    None                Consultations During Hospital Stay:  · Infectious disease    Procedures Performed:   · IR PICC    Significant Findings / Test Results:   CT chest abdomen pelvis wo contrast    Result Date: 1/7/2022  Impression: No acute inflammatory process identified  Findings suggestive of esophageal reflux and esophagitis  Consider upper endoscopy when clinically appropriate, if not recently performed  Additional nonacute findings, as described, unchanged   Workstation performed: [id number]     XR chest 1 view portable    Result Date: 1/7/2022  Impression: Stable postoperative appearance of the right thorax post pneumonectomy  Left lung is clear Workstation performed: [id number]     MRI thoracic spine w wo contrast    Result Date: 1/9/2022  Impression: Normal enhanced MRI of the thoracic spine  Postoperative change within the right chest status post pneumonectomy with mildly complex effusion  Workstation performed: [id number]     IR port removal    Result Date: 1/10/2022  · Impression: 1  Chest port removal  2   Non-tunneled central venous catheter placement  Workstation performed: [id number]     Incidental Findings:   · none     Test Results Pending at Discharge (will require follow up):   · none     Outpatient Tests Requested:  · none    Complications:  none    Reason for Admission:  Gram Positive Bacteremia   Hospital Course:     Graciela Leon is a 42 y o  female patient who originally presented to the hospital on 1/7/2022 with pmh CF, b/l lung transplant, dvt on eliquis who presented with sepsis 2/2 gram positive bacteremia  Possible zoonatic infection  Blood cultures cleared and medically clear,Treated with daptomycin with improvement  Will complete iv antibiotics on discharge on 1/24  Will f/u with pcp in 1 week      Please see above list of diagnoses and related plan for additional information  Condition at Discharge: stable     Discharge Day Visit / Exam:     Subjective:  No acute complaints  Vitals: Blood Pressure: (!) 151/101 (01/14/22 0826)  Pulse: 73 (01/14/22 0826)  Temperature: 98 1 °F (36 7 °C) (01/14/22 0826)  Temp Source: Oral (01/13/22 1516)  Respirations: 16 (01/14/22 0826)  Height: 5' 2" (157 5 cm) (01/09/22 0815)  Weight - Scale: 43 5 kg (96 lb) (01/09/22 0815)  SpO2: 98 % (01/14/22 0930)  Exam:   Physical Exam  Constitutional:       General: She is not in acute distress  Appearance: She is well-developed  She is not diaphoretic  HENT:      Head: Normocephalic and atraumatic  Nose: Nose normal       Mouth/Throat:      Pharynx: No oropharyngeal exudate  Eyes:      General: No scleral icterus  Right eye: No discharge  Left eye: No discharge  Conjunctiva/sclera: Conjunctivae normal    Neck:      Thyroid: No thyromegaly  Vascular: No JVD  Cardiovascular:      Rate and Rhythm: Normal rate and regular rhythm  Heart sounds: Normal heart sounds  No murmur heard  No friction rub  No gallop  Pulmonary:      Effort: Pulmonary effort is normal  No respiratory distress  Breath sounds: Normal breath sounds  No wheezing or rales  Chest:      Chest wall: No tenderness  Abdominal:      General: Bowel sounds are normal  There is no distension  Palpations: Abdomen is soft  Tenderness: There is no abdominal tenderness  There is no guarding or rebound  Musculoskeletal:         General: No tenderness or deformity  Normal range of motion  Cervical back: Normal range of motion and neck supple  Skin:     General: Skin is warm and dry  Findings: No erythema or rash  Neurological:      Mental Status: She is alert  Mental status is at baseline  Cranial Nerves: No cranial nerve deficit  Sensory: No sensory deficit  Motor: No abnormal muscle tone  Coordination: Coordination normal          Discussion with Family: pt    Discharge instructions/Information to patient and family:   See after visit summary for information provided to patient and family  Provisions for Follow-Up Care:  See after visit summary for information related to follow-up care and any pertinent home health orders  Disposition:     Home with VNA Services (Reminder: Complete face to face encounter)    For Discharges to [facility] SNF:   · Not Applicable to this Patient - Not Applicable to this Patient    Planned Readmission: none     Discharge Statement:  I spent 60 minutes discharging the patient  This time was spent on the day of discharge  I had direct contact with the patient on the day of discharge   Greater than 50% of the total time was spent examining patient, answering all patient questions, arranging and discussing plan of care with patient as well as directly providing post-discharge instructions  Additional time then spent on discharge activities  Discharge Medications:  See after visit summary for reconciled discharge medications provided to patient and family        ** Please Note: This note has been constructed using a voice recognition system **

## 2022-01-14 NOTE — PROGRESS NOTES
Progress Note - Infectious Disease   Herlinda Fortune 42 y o  female MRN: [id number]  Unit/Bed#: -01 Encounter: [id number]    Impression/Recommendations:  1  Sepsis, POA:  Fever, leukopenia in the setting of Staph bacteremia  Temperature curve improving, WBC has normalized  · Antibiotic therapy as below  · Monitor clinical response, temperature curve  · Supportive care per primary service  2  Staph pseudintermedius, Staph schleiferi bacteremia:  Suspected port infection  Presumed zoonotic infection as patient has three pet dogs at home  Port was removed on Jan 10th; catheter tip cx are negative  Central venous catheter placed given patient's poor venous access  Temperature curve improving  1/9 2D echocardiogram limited study with aortic and pulmonic pulmonic valves not well visualized  1/9 repeat blood cx are negative to date  · Continue daptomycin IV, verified with microbiology lab that both Staph species are susceptible to daptomycin  · Anticipate 2 week duration of IV antibiotic therapy from time of port removal until Jan 24th  Rx provided to case management team   · Weekly labs while on daptomycin:  CBC with diff, creatinine, LFTs, CK  · IR to remove tunneled PICC line upon completion of IV ABx therapy  · Defer DEJUAN for now unless fevers rebound or bacteremia persists  3  Cystic fibrosis:  Status post bilateral lung transplant 2013 and left lung retransplant 2017  Immunocompromised patient  Patient has had multiple infections in the past and 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  She follows with [facility] transplant  · Continues chronic immunosuppression with tacrolimus and prednisone  · Continue prophylaxis with atovaquone and Valtrex  · CMV monitoring as outpatient  4  Malnutrition and diabetes mellitus  · Supportive care as per primary     My recommendations were discussed with the patient who verbalized understanding   The ID service will follow  Antibiotics: daptomycin since Jan 8th  Scheduled Meds:  Current Facility-Administered Medications   Medication Dose Route Frequency Provider Last Rate    acetaminophen  650 mg Oral Q4H PRN Jovita Hoskins MD      ALPRAZolam  1 mg Oral BID Jovita Hoskins MD      aluminum-magnesium hydroxide-simethicone  30 mL Oral Q4H PRN Claudette Heller, CRNP      apixaban  5 mg Oral BID Jovita Hoskins MD      atovaquone  1,500 mg Oral Daily Jovita Hoskins MD      DAPTOmycin  8 mg/kg Intravenous Q24H Judit Foy  mg (01/14/22 1205)    diphenhydrAMINE  25 mg Intravenous Q6H PRN Stephanie Gunderson,       hydrocortisone sodium succinate  50 mg Intravenous [facility] Jovita Hoskins MD      hydrOXYzine HCL  50 mg Oral Q6H PRN Claudette Heller, CRNP      insulin lispro  1-5 Units Subcutaneous 4x Daily (AC & HS) Claudette Heller, CRNP      lidocaine  2 patch Topical Daily Jovita Hoskins MD      OXcarbazepine  300 mg Oral Q12H [facility] An Scott Freeze, WAYNE      oxyCODONE  10 mg Oral Q4H PRN Stephanie Gunderson, DO      oxyCODONE  5 mg Oral Q6H PRN Stephanie Gunderson, DO      pancrelipase (Lip-Prot-Amyl)  48,000 Units Oral TID With Meals Stephanie Gunderson,       pantoprazole  40 mg Oral Early Morning Jovita Hoskins MD      polyethylene glycol  17 g Oral BID PRN Jovita Hoskins MD      polyethylene glycol  17 g Oral Daily Sai Serrano MD      senna-docusate sodium  1 tablet Oral HS Sai Serrano MD      sodium chloride  4 mL Nebulization BID Stephanie Gunderson, DO      tacrolimus  3 mg Oral BID Jovita Hoskins MD      traZODone  25 mg Oral HS Jovita Hoskins MD      valACYclovir  500 mg Oral Q24H Judit Foy MD       Continuous Infusions:   PRN Meds:   acetaminophen    aluminum-magnesium hydroxide-simethicone    diphenhydrAMINE    hydrOXYzine HCL    oxyCODONE    oxyCODONE    polyethylene glycol    Subjective:  Patient feels well and offers no complaints  She denies fever, rash, vomiting, diarrhea, shortness of breath  No pain is reported      Objective:  Vitals:  Temp:  [98 °F (36 7 °C)-98 1 °F (36 7 °C)] 98 1 °F (36 7 °C)  HR:  [69-74] 73  Resp:  [16] 16  BP: (131-159)/() 151/101  SpO2:  [97 %-98 %] 98 %  Temp (24hrs), Av °F (36 7 °C), Min:98 °F (36 7 °C), Max:98 1 °F (36 7 °C)  Current: Temperature: 98 1 °F (36 7 °C)  Physical Exam:   General Appearance:  Alert, awake, nontoxic, no acute distress   ENT: Oropharynx moist without lesions   Lungs:   Clear to auscultation bilaterally; respirations unlabored   Heart:  S1, S2, RRR, no murmur   Abdomen:   Soft, non-tender, non-distended   : No Kim catheter present   Extremities: No distal leg edema b/l   Neurologic: AAO to person, surroundings, conversant, fluent speech   Skin: RT ACW tunneled PICC line intact, no rash     Labs, Cultures, Imaging, & Other studies:   All pertinent labs, cultures and imaging studies were personally reviewed  Results from last 7 days   Lab Units 22  0531 22  0552 22  0629   WBC Thousand/uL 6 57 5 79 4 57   HEMOGLOBIN g/dL 10 0* 9 6* 10 9*   PLATELETS Thousands/uL 254 233 223     Results from last 7 days   Lab Units 22  0531 22  0552 22  0552 22  0629 22  0629 22  0508 01/10/22  0432 22  0538 22  1113   SODIUM mmol/L 135*  --  136  --  137   < > 138   < > 133*   POTASSIUM mmol/L 3 9   < > 3 6   < > 3 9   < > 4 0   < > 4 8   CHLORIDE mmol/L 98*   < > 99*   < > 99*   < > 102   < > 99*   CO2 mmol/L 28   < > 31   < > 30   < > 27   < > 25   BUN mg/dL 34*   < > 31*   < > 26*   < > 19   < > 20   CREATININE mg/dL 0 90   < > 0 81   < > 0 89   < > 1 06   < > 1 18   EGFR ml/min/1 73sq m 78   < > 88   < > 79   < > 64   < > 56   CALCIUM mg/dL 8 8   < > 8 7   < > 9 3   < > 8 6   < > 7 5*   AST U/L  --   --   --   --   --   --  21  --  42   ALT U/L  --   --   --   --   --   --  48  --  105*   ALK PHOS U/L  --   --   --   --   -- --  189*  --  228*    < > = values in this interval not displayed  Results from last 7 days   Lab Units 01/09/22  0524   BLOOD CULTURE  No Growth After 4 Days  No Growth After 4 Days       Results from last 7 days   Lab Units 01/08/22  1113   PROCALCITONIN ng/ml 0 24         Results from last 7 days   Lab Units 01/09/22  0538   FERRITIN ng/mL 12

## 2022-01-17 ENCOUNTER — PREP FOR PROCEDURE (OUTPATIENT)
Dept: INTERVENTIONAL RADIOLOGY/VASCULAR | Facility: CLINIC | Age: 47
End: 2022-01-17

## 2022-01-17 DIAGNOSIS — R78.81 GRAM-POSITIVE COCCI BACTEREMIA: Primary | ICD-10-CM

## 2022-01-17 DIAGNOSIS — E84.9 CYSTIC FIBROSIS (HCC): Primary | ICD-10-CM

## 2022-01-18 ENCOUNTER — TELEPHONE (OUTPATIENT)
Dept: FAMILY MEDICINE CLINIC | Facility: CLINIC | Age: 47
End: 2022-01-18

## 2022-01-18 NOTE — TELEPHONE ENCOUNTER
I called to Central scheduling and I have scheduled the EMG for March 28 at 1115 instuctions only pertain not putting any lotion to the leg to have the testing done for 24 hours  [address]  Will call to pt or  to inform them   I am also trying to set up an appt with guerrero for hospital follow up

## 2022-01-19 NOTE — UTILIZATION REVIEW
Notification of Discharge   This is a Notification of Discharge from our facility [address] [place] [address]  Please be advised that this patient has been discharge from our facility  Below you will find the admission and discharge date and time including the patients disposition  UTILIZATION REVIEW CONTACT:  Crystal Moreno  Utilization   Network Utilization Review Department  Phone: [phone] x carefully listen to the prompts  All voicemails are confidential   Email: [email or web address]     PHYSICIAN ADVISORY SERVICES:  FOR [id number] REVIEW - MEDICAL NECESSITY DENIAL  Phone: [phone]  Fax: [phone]  Email: [email or web address]     PRESENTATION DATE: 1/7/2022  8:07 AM  OBERVATION ADMISSION DATE:   INPATIENT ADMISSION DATE: 1/7/22  5:27 PM   DISCHARGE DATE: 1/14/2022  2:46 PM  DISPOSITION: Home with [place] with [address] INFORMATION:  Send all requests for admission clinical reviews, approved or denied determinations and any other requests to dedicated fax number below belonging to the campus where the patient is receiving treatment   List of dedicated fax numbers:  [address] DENIALS (Administrative/Medical Necessity) [phone]   [address]  [facility] (Maternity/NICU/Pediatrics) [phone]   Lupe Kida [phone]   [address] [facility] [phone]   [address] [place] [address] [phone]   [address] [place] [address] [address] [place] [address],[address] [phone]   [facility]  [phone]   [address] [place] [address], [facility]  [address] [facility] 1000 [facility] [phone]

## 2022-01-20 LAB — GLUCOSE SERPL-MCNC: 275 MG/DL (ref 65–140)

## 2022-01-20 NOTE — TELEPHONE ENCOUNTER
They did schedule an appt but now she needs a hospital follow up, so I think they can gether in sooner since they saw her in the hospital

## 2022-01-25 ENCOUNTER — TELEPHONE (OUTPATIENT)
Dept: NEUROLOGY | Facility: CLINIC | Age: 47
End: 2022-01-25

## 2022-01-25 ENCOUNTER — HOSPITAL ENCOUNTER (OUTPATIENT)
Dept: NON INVASIVE DIAGNOSTICS | Facility: HOSPITAL | Age: 47
Discharge: HOME | End: 2022-01-25
Attending: STUDENT IN AN ORGANIZED HEALTH CARE EDUCATION/TRAINING PROGRAM

## 2022-01-25 DIAGNOSIS — E84.9 CYSTIC FIBROSIS (HCC): ICD-10-CM

## 2022-01-25 NOTE — TELEPHONE ENCOUNTER
HFU/SLMO/ATT/AP/Neuropathic pain of right foot/DC1/14    Note from Chart:      Sebas Sanchez Sic will need follow up in in 4 weeks with general attending or advance practitioner  She will require a EMG/NCS within 3 weeks

## 2022-01-28 ENCOUNTER — PROCEDURE VISIT (OUTPATIENT)
Dept: NEUROLOGY | Facility: CLINIC | Age: 47
End: 2022-01-28

## 2022-01-28 ENCOUNTER — TELEPHONE (OUTPATIENT)
Dept: NEUROLOGY | Facility: CLINIC | Age: 47
End: 2022-01-28

## 2022-01-28 DIAGNOSIS — M79.2 NEUROPATHIC PAIN OF RIGHT FOOT: ICD-10-CM

## 2022-01-28 NOTE — PROGRESS NOTES
EMG 1 Limb     Date/Time 1/28/2022 2:42 PM     Performed by  Bruce Morocho MD     Authorized by Ester Benavides PA-C            Patient refused testing as she could not tolerate the study

## 2022-02-11 ENCOUNTER — OFFICE VISIT (OUTPATIENT)
Dept: NEUROLOGY | Facility: CLINIC | Age: 47
End: 2022-02-11
Payer: COMMERCIAL

## 2022-02-11 VITALS
BODY MASS INDEX: 18.33 KG/M2 | HEART RATE: 98 BPM | TEMPERATURE: 97.6 F | DIASTOLIC BLOOD PRESSURE: 100 MMHG | HEIGHT: 62 IN | SYSTOLIC BLOOD PRESSURE: 142 MMHG | OXYGEN SATURATION: 100 % | WEIGHT: 99.6 LBS

## 2022-02-11 DIAGNOSIS — R20.0 NUMBNESS AND TINGLING: ICD-10-CM

## 2022-02-11 DIAGNOSIS — Z79.52 LONG TERM (CURRENT) USE OF SYSTEMIC STEROIDS: ICD-10-CM

## 2022-02-11 DIAGNOSIS — M79.671 RIGHT FOOT PAIN: Primary | ICD-10-CM

## 2022-02-11 DIAGNOSIS — R20.2 NUMBNESS AND TINGLING: ICD-10-CM

## 2022-02-11 DIAGNOSIS — R29.898 WEAKNESS OF RIGHT LOWER EXTREMITY: ICD-10-CM

## 2022-02-11 PROCEDURE — 99214 OFFICE O/P EST MOD 30 MIN: CPT | Performed by: PSYCHIATRY & NEUROLOGY

## 2022-02-11 PROCEDURE — 3008F BODY MASS INDEX DOCD: CPT | Performed by: PSYCHIATRY & NEUROLOGY

## 2022-02-11 PROCEDURE — 1036F TOBACCO NON-USER: CPT | Performed by: PSYCHIATRY & NEUROLOGY

## 2022-02-11 PROCEDURE — 1111F DSCHRG MED/CURRENT MED MERGE: CPT | Performed by: PSYCHIATRY & NEUROLOGY

## 2022-02-11 RX ORDER — GABAPENTIN 600 MG/1
600 TABLET ORAL 3 TIMES DAILY
Qty: 90 TABLET | Refills: 1 | Status: SHIPPED | OUTPATIENT
Start: 2022-02-11 | End: 2022-04-12 | Stop reason: ALTCHOICE

## 2022-02-11 NOTE — ASSESSMENT & PLAN NOTE
Since discharge from the hospital, Marcus Poster reports she continues to have severe right foot pain/numbness and is unable to drive but she is getting stronger and doing more with physical therapy  Pain started after pneumonectomy procedure and seems neuropathic  EMG was attempted on 01/28/2022 however she was unable to tolerate the study  She was previously on Gabapentin 300 mg bid but was changed to Lyrica 150 mg 3 times a day with no relief, this was tapered to 75 mg 3 times a day then discontinued  She was then placed on oxcarbazepine 300 mg twice daily but did not feel this helped  She states approximately 2-3 weeks ago she discontinued oxcarbazepine and restarted Gabapentin but changed her dose to 600 mg tid (using an old prescription)  She feels this gives her the most relief, but still very painful at baseline she has pain 6/10  Initially it was burning, now more numbness,tingling, and "electrical shocks", mainly in her great right toe and the ball of her foot  It feels tight and "pulling"  It rarely radiates into her ankle or her leg  It lasts 30-60 seconds at a time  It occurs 30 times+ per day  She ambulates with a cane due to pain and weakness  At baseline she has numbness and tingling constantly at the bottom of her right foot/ great toe  She cannot drive due to this  She is receiving PT twice a week in her home  She has tried capcasin cream, lidocaine, and voltaren gel without relief  We discussed today that the etiology of her right foot pain, numbness/tingling, and weakness is unknown at this time  Unfortunately she was unable to tolerate the EMG  We agreed to have her continue gabapentin 600 mg 3 times a day for a short time until we can obtain further testing to evaluate her pain  We will try to obtain an MRI of the foot to evaluate for any lesion or fracture that may be present  She is on long-term prednisone due to her transplant history, which places her at risk for fracture    We will also obtain baseline neuropathy lab work  I also recommended a referral to both orthopedic medicine as well as pain management, I would appreciate their input on this complex case  She will continue to work with physical therapy and will follow-up in 3 months, sooner if we can obtain testing and referrals sooner

## 2022-02-11 NOTE — PROGRESS NOTES
Patient ID: Sydnie Roberts is a 42 y o  female  Assessment/Plan:    No problem-specific Assessment & Plan notes found for this encounter  {Assess/PlanSmartLinks:80683}       Subjective:    HPI    {[facility] HPI texts:46145}    {Common ambulatory SmartLinks:91696}         Objective:    Blood pressure 142/100, pulse 98, temperature 97 6 °F (36 4 °C), temperature source Temporal, height 5' 2" (1 575 m), weight 45 2 kg (99 lb 9 6 oz), SpO2 100 %, not currently breastfeeding  Physical Exam    Neurological Exam      ROS:    Review of Systems   Constitutional: Negative  Negative for appetite change and fever  HENT: Negative  Negative for hearing loss, tinnitus, trouble swallowing and voice change  Eyes: Negative  Negative for photophobia and pain  Respiratory: Negative  Negative for shortness of breath  Cardiovascular: Negative  Negative for palpitations  Gastrointestinal: Negative  Negative for nausea and vomiting  Endocrine: Negative  Negative for cold intolerance  Genitourinary: Negative  Negative for dysuria, frequency and urgency  Musculoskeletal: Positive for back pain and gait problem  Negative for myalgias and neck pain  Skin: Negative  Negative for rash  Neurological: Positive for weakness and numbness  Negative for dizziness, tremors, seizures, syncope, facial asymmetry, speech difficulty, light-headedness and headaches  Patient stated that she has numbness and weakness in the right foot for 3 months  Hematological: Negative  Does not bruise/bleed easily  Psychiatric/Behavioral: Positive for sleep disturbance  Negative for confusion and hallucinations

## 2022-02-11 NOTE — PATIENT INSTRUCTIONS
- Continue Gabapentin 600 mg three times a day for now  - Get MRI of the right foot  - Referral to Ortho  - Referral to pain management  - Get lab work completed  - Follow up in 3 months

## 2022-02-11 NOTE — PROGRESS NOTES
Patient ID: Ryan Aguilar is a 42 y o  female  Assessment/Plan:    Right foot pain  Since discharge from the hospital, Bronson Gonzales reports she continues to have severe right foot pain/numbness and is unable to drive but she is getting stronger and doing more with physical therapy  Pain started after pneumonectomy procedure and seems neuropathic  EMG was attempted on 01/28/2022 however she was unable to tolerate the study  She was previously on Gabapentin 300 mg bid but was changed to Lyrica 150 mg 3 times a day with no relief, this was tapered to 75 mg 3 times a day then discontinued  She was then placed on oxcarbazepine 300 mg twice daily but did not feel this helped  She states approximately 2-3 weeks ago she discontinued oxcarbazepine and restarted Gabapentin but changed her dose to 600 mg tid (using an old prescription)  She feels this gives her the most relief, but still very painful at baseline she has pain 6/10  Initially it was burning, now more numbness,tingling, and "electrical shocks", mainly in her great right toe and the ball of her foot  It feels tight and "pulling"  It rarely radiates into her ankle or her leg  It lasts 30-60 seconds at a time  It occurs 30 times+ per day  She ambulates with a cane due to pain and weakness  At baseline she has numbness and tingling constantly at the bottom of her right foot/ great toe  She cannot drive due to this  She is receiving PT twice a week in her home  She has tried capcasin cream, lidocaine, and voltaren gel without relief  We discussed today that the etiology of her right foot pain, numbness/tingling, and weakness is unknown at this time  Unfortunately she was unable to tolerate the EMG  We agreed to have her continue gabapentin 600 mg 3 times a day for a short time until we can obtain further testing to evaluate her pain  We will try to obtain an MRI of the foot to evaluate for any lesion or fracture that may be present    She is on long-term prednisone due to her transplant history, which places her at risk for fracture  We will also obtain baseline neuropathy lab work  I also recommended a referral to both orthopedic medicine as well as pain management, I would appreciate their input on this complex case  She will continue to work with physical therapy and will follow-up in 3 months, sooner if we can obtain testing and referrals sooner  Diagnoses and all orders for this visit:    Right foot pain  -     MRI foot/forefoot toes right wo contrast; Future  -     Ambulatory Referral to Orthopedic Surgery; Future  -     gabapentin (Neurontin) 600 MG tablet; Take 1 tablet (600 mg total) by mouth 3 (three) times a day  -     Ambulatory Referral to Pain Management; Future    Weakness of right lower extremity  -     Ambulatory referral to Neurology  -     MRI foot/forefoot toes right wo contrast; Future  -     Ambulatory Referral to Orthopedic Surgery; Future  -     Ambulatory Referral to Pain Management; Future    Long term (current) use of systemic steroids  -     MRI foot/forefoot toes right wo contrast; Future  -     TSH, 3rd generation with Free T4 reflex; Future    Numbness and tingling  -     TSH, 3rd generation with Free T4 reflex; Future  -     Lyme Antibody Profile with reflex to WB; Future  -     Vitamin B12; Future  -     Folate; Future  -     Protein electrophoresis, serum; Future           Subjective:    HPI Charity Larson is a 42year old female who presents to the office in consultation for her right lower extremity pain and weakness  She has medical history significant for diabetes, asthma, cystic fibrosis, DVT, hypertension, osteoporosis, CKD, bilateral lung transplant, anxiety, chronic immunosuppression, and hyperlipidemia  Per chart review Tereso Singh was admitted to [place] [address] hospitalization from January 7, 2022 - January 14, 2022 for fever, staph schleiferi bacteremia   She was treated with IV daptomycin and had her port removed on 1/10/22  She completed IV abx on 1/24/22  Cather tip cultures and subsequent blood cultures were reportedly negative  Source was not clear - TTE and thoracic spine MRI were unrevealing  Dilcia Hernandez was previously hospitalized from September 26, 2021 to October 28, 2021 for treatment of multifocal aspiration pneumonia, right-sided chest pain and severe gastroparesis and ultimately right-sided pneumonectomy-she has had a history of recurrent respiratory failure due to shunting through the right lung  Right pneumonectomy with myocutaneous/fasciocutaneous flap was performed on October 14, 2021  In addition, G and J-tube was placed  She recovered over the next 2 weeks from surgery and was ultimately transferred to the [facility] for rehabilitation on October 28th  During the hospitalization , she was treated with IV antibiotics-amikacin, caspofungin, imipenem and linezolid for history of M  Abscessus colonization  She remained at the [facility] until November 19, 2021  Respiratory symptoms steadily improved  Antibiotics were discontinued on November 15, 2021  Small bore central catheter was removed on November 19, 2021  She does have a port  Immunosuppression with tacrolimus and prednisone was continued  She also remains on atovaquone and fell acyclovir prophylaxis  CMV levels were negative  Ultimately, diet was advanced  She did develop severe right lower extremity neuropathic pain  This was felt to be related to ITP band pain/syndrome  Thoracic and lumbar spine x-rays demonstrated degenerative changes  MRI of the lumbar spine was obtained on November 5 and showed multilevel degenerative changes without significant spinal canal or neural foraminal stenosis  She was on multimodal pain management and thought that Lyrica was helpful  Thigh pain improved but she continues to have significant foot pain  She remains on apixaban for left IJ DVT      Since discharge from the hospital, Dilcia Mary reports she continues to have severe right foot pain/numbness and is unable to drive but she is getting stronger and doing more with physical therapy  Pain started after pneumonectomy procedure and seems neuropathic  EMG was attempted on 01/28/2022 however she was unable to tolerate the study  She was previously on Gabapentin 300 mg bid but was changed to Lyrica 150 mg 3 times a day with no relief, this was tapered to 75 mg 3 times a day then discontinued  She was then placed on oxcarbazepine 300 mg twice daily but did not feel this helped  She states approximately 2-3 weeks ago she discontinued oxcarbazepine and restarted Gabapentin but changed her dose to 600 mg tid (using an old prescription)  She feels this gives her the most relief, but still very painful at baseline she has pain 6/10  Initially it was burning, now more numbness,tingling, and "electrical shocks", mainly in her great right toe and the ball of her foot  It feels tight and "pulling"  It rarely radiates into her ankle or her leg  It lasts 30-60 seconds at a time  It occurs 30 times+ per day  She ambulates with a cane due to pain and weakness  At baseline she has numbness and tingling constantly at the bottom of her right foot/ great toe  She cannot drive due to this  She is receiving PT twice a week in her home  She has tried capcasin cream, lidocaine, and voltaren gel without relief  The following portions of the patient's history were reviewed and updated as appropriate: allergies, current medications, past family history, past medical history, past social history and past surgical history  Objective:    Blood pressure 142/100, pulse 98, temperature 97 6 °F (36 4 °C), temperature source Temporal, height 5' 2" (1 575 m), weight 45 2 kg (99 lb 9 6 oz), SpO2 100 %, not currently breastfeeding  Neurological Exam       On neurological examination patient is alert, awake, oriented x3 and in no distress  Speech is fluent without dysarthria or aphasia   Cranial nerves 2-12 were symmetrically intact bilaterally  On motor examination she has evidence of dorsiflexor and plantar flexor weakness in the 3+/5 range and mild weakness in the right hamstrings in the 4+/5 range  Other extremities 5/5 strength  There was no pronator drift  No fasciculations present  No abnormal involuntary movements  Finger- to-nose reveals baseline tremor, she states this is chronic due to her long-term prednisone use  However no dysmetria was noted and she has intact proprioceptive function  Sensation was intact to vibration, light touch, pinprick and temperature in bilateral upper extremities  She has hypersensitivity to pinprick and touch along the dorsum and the lateral aspect of the right foot  Deep tendon reflexes were 2+ and symmetric in the bilateral upper and lower extremities  She is ambulating with a 4 point walker today, her stride is shortened and she is ambulating slowly  Stance and arm swing appear normal  tandem gait was deferred due to safety concern  There is no tenderness on palpation of the cervical, thoracic, or lumbar spinous process, with no radicular pain noted  Straight leg test was negative on the right  ROS:    Review of Systems  Constitutional: Negative  Negative for appetite change and fever  HENT: Negative  Negative for hearing loss, tinnitus, trouble swallowing and voice change  Eyes: Negative  Negative for photophobia and pain  Respiratory: Negative  Negative for shortness of breath  Cardiovascular: Negative  Negative for palpitations  Gastrointestinal: Negative  Negative for nausea and vomiting  Endocrine: Negative  Negative for cold intolerance  Genitourinary: Negative  Negative for dysuria, frequency and urgency  Musculoskeletal: Positive for back pain and gait problem  Negative for myalgias and neck pain  Skin: Negative  Negative for rash  Neurological: Positive for weakness and numbness   Negative for dizziness, tremors, seizures, syncope, facial asymmetry, speech difficulty, light-headedness and headaches  Patient stated that she has numbness and weakness in the right foot for 3 months  Hematological: Negative  Does not bruise/bleed easily  Psychiatric/Behavioral: Positive for sleep disturbance  Negative for confusion and hallucinations       Reviewed ROS as entered by medical assistant

## 2022-02-21 ENCOUNTER — TELEPHONE (OUTPATIENT)
Dept: OBGYN CLINIC | Facility: MEDICAL CENTER | Age: 47
End: 2022-02-21

## 2022-02-21 ENCOUNTER — TELEPHONE (OUTPATIENT)
Dept: OBGYN CLINIC | Facility: CLINIC | Age: 47
End: 2022-02-21

## 2022-02-21 NOTE — TELEPHONE ENCOUNTER
----- Message from Lopez Mckeon sent at 2/21/2022  4:49 PM EST -----  Regarding: Mammogram  Hi,     Can you let me know where the mammogram will take place? I am only asking because I am only able to drive due short distances due to severe nerve damage in my right foot      Thanks,   ETHEL Jenkins

## 2022-03-07 ENCOUNTER — HOSPITAL ENCOUNTER (OUTPATIENT)
Dept: MRI IMAGING | Facility: HOSPITAL | Age: 47
Discharge: HOME | End: 2022-03-07
Payer: COMMERCIAL

## 2022-03-07 DIAGNOSIS — Z79.52 LONG TERM (CURRENT) USE OF SYSTEMIC STEROIDS: ICD-10-CM

## 2022-03-07 DIAGNOSIS — R29.898 WEAKNESS OF RIGHT LOWER EXTREMITY: ICD-10-CM

## 2022-03-07 DIAGNOSIS — M79.671 RIGHT FOOT PAIN: ICD-10-CM

## 2022-03-07 PROCEDURE — G1004 CDSM NDSC: HCPCS

## 2022-03-07 PROCEDURE — 73718 MRI LOWER EXTREMITY W/O DYE: CPT

## 2022-03-08 ENCOUNTER — PATIENT MESSAGE (OUTPATIENT)
Dept: NEUROLOGY | Facility: CLINIC | Age: 47
End: 2022-03-08

## 2022-03-08 DIAGNOSIS — M79.671 PAIN IN RIGHT FOOT: ICD-10-CM

## 2022-03-08 DIAGNOSIS — M79.2 NEUROPATHIC PAIN OF RIGHT FOOT: Primary | ICD-10-CM

## 2022-03-08 DIAGNOSIS — M79.671 RIGHT FOOT PAIN: ICD-10-CM

## 2022-03-11 NOTE — TELEPHONE ENCOUNTER
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 I am not having as much pain in my foot anymore, I can walk without assistance during the day and Ive done some very light driving, at night it gets very bad again      Many thanks,   ETHEL Jenkins

## 2022-03-18 ENCOUNTER — HOSPITAL ENCOUNTER (OUTPATIENT)
Dept: MAMMOGRAPHY | Facility: CLINIC | Age: 47
Discharge: HOME | End: 2022-03-18
Payer: COMMERCIAL

## 2022-03-18 DIAGNOSIS — Z12.31 ENCOUNTER FOR SCREENING MAMMOGRAM FOR MALIGNANT NEOPLASM OF BREAST: ICD-10-CM

## 2022-03-18 PROCEDURE — 77067 SCR MAMMO BI INCL CAD: CPT

## 2022-03-18 PROCEDURE — 77063 BREAST TOMOSYNTHESIS BI: CPT

## 2022-03-21 LAB
LEFT EYE DIABETIC RETINOPATHY: NORMAL
RIGHT EYE DIABETIC RETINOPATHY: NORMAL
SEVERITY (EYE EXAM): NORMAL

## 2022-03-22 ENCOUNTER — HOSPITAL ENCOUNTER (OUTPATIENT)
Dept: NON INVASIVE DIAGNOSTICS | Facility: HOSPITAL | Age: 47
Discharge: HOME | End: 2022-03-22
Admitting: RADIOLOGY
Payer: COMMERCIAL

## 2022-03-22 VITALS
RESPIRATION RATE: 15 BRPM | OXYGEN SATURATION: 98 % | DIASTOLIC BLOOD PRESSURE: 72 MMHG | TEMPERATURE: 97.5 F | SYSTOLIC BLOOD PRESSURE: 120 MMHG | BODY MASS INDEX: 19.31 KG/M2 | WEIGHT: 104.94 LBS | HEART RATE: 76 BPM | HEIGHT: 62 IN

## 2022-03-22 DIAGNOSIS — Z94.2 HISTORY OF LUNG TRANSPLANT (HCC): ICD-10-CM

## 2022-03-22 LAB
EXT PREGNANCY TEST URINE: NEGATIVE
EXT. CONTROL: NORMAL

## 2022-03-22 PROCEDURE — 76937 US GUIDE VASCULAR ACCESS: CPT | Performed by: RADIOLOGY

## 2022-03-22 PROCEDURE — 81025 URINE PREGNANCY TEST: CPT | Performed by: RADIOLOGY

## 2022-03-22 PROCEDURE — 75860 VEIN X-RAY NECK: CPT | Performed by: RADIOLOGY

## 2022-03-22 PROCEDURE — 36299 UNLISTED PX VASCULAR NJX: CPT | Performed by: RADIOLOGY

## 2022-03-22 PROCEDURE — C1788 PORT, INDWELLING, IMP: HCPCS

## 2022-03-22 PROCEDURE — 76937 US GUIDE VASCULAR ACCESS: CPT

## 2022-03-22 PROCEDURE — 36561 INSERT TUNNELED CV CATH: CPT | Performed by: RADIOLOGY

## 2022-03-22 PROCEDURE — 75827 VEIN X-RAY CHEST: CPT

## 2022-03-22 PROCEDURE — 77001 FLUOROGUIDE FOR VEIN DEVICE: CPT | Performed by: RADIOLOGY

## 2022-03-22 PROCEDURE — 99152 MOD SED SAME PHYS/QHP 5/>YRS: CPT | Performed by: RADIOLOGY

## 2022-03-22 PROCEDURE — 99153 MOD SED SAME PHYS/QHP EA: CPT

## 2022-03-22 PROCEDURE — 99152 MOD SED SAME PHYS/QHP 5/>YRS: CPT

## 2022-03-22 PROCEDURE — 36561 INSERT TUNNELED CV CATH: CPT

## 2022-03-22 RX ORDER — FENTANYL CITRATE 50 UG/ML
INJECTION, SOLUTION INTRAMUSCULAR; INTRAVENOUS CODE/TRAUMA/SEDATION MEDICATION
Status: COMPLETED | OUTPATIENT
Start: 2022-03-22 | End: 2022-03-22

## 2022-03-22 RX ORDER — MIDAZOLAM HYDROCHLORIDE 2 MG/2ML
INJECTION, SOLUTION INTRAMUSCULAR; INTRAVENOUS CODE/TRAUMA/SEDATION MEDICATION
Status: COMPLETED | OUTPATIENT
Start: 2022-03-22 | End: 2022-03-22

## 2022-03-22 RX ORDER — CLINDAMYCIN PHOSPHATE 600 MG/50ML
600 INJECTION INTRAVENOUS ONCE
Status: COMPLETED | OUTPATIENT
Start: 2022-03-22 | End: 2022-03-22

## 2022-03-22 RX ORDER — ACETAMINOPHEN 325 MG/1
650 TABLET ORAL EVERY 4 HOURS PRN
Status: DISCONTINUED | OUTPATIENT
Start: 2022-03-22 | End: 2022-03-23 | Stop reason: HOSPADM

## 2022-03-22 RX ORDER — SODIUM CHLORIDE 9 MG/ML
50 INJECTION, SOLUTION INTRAVENOUS CONTINUOUS
Status: DISCONTINUED | OUTPATIENT
Start: 2022-03-22 | End: 2022-03-23 | Stop reason: HOSPADM

## 2022-03-22 RX ADMIN — FENTANYL CITRATE 50 MCG: 50 INJECTION, SOLUTION INTRAMUSCULAR; INTRAVENOUS at 13:21

## 2022-03-22 RX ADMIN — Medication 10 ML: at 14:02

## 2022-03-22 RX ADMIN — FENTANYL CITRATE 50 MCG: 50 INJECTION, SOLUTION INTRAMUSCULAR; INTRAVENOUS at 14:02

## 2022-03-22 RX ADMIN — Medication 10 ML: at 13:21

## 2022-03-22 RX ADMIN — MIDAZOLAM HYDROCHLORIDE 1 MG: 1 INJECTION, SOLUTION INTRAMUSCULAR; INTRAVENOUS at 12:59

## 2022-03-22 RX ADMIN — FENTANYL CITRATE 50 MCG: 50 INJECTION, SOLUTION INTRAMUSCULAR; INTRAVENOUS at 13:00

## 2022-03-22 RX ADMIN — MIDAZOLAM HYDROCHLORIDE 1 MG: 1 INJECTION, SOLUTION INTRAMUSCULAR; INTRAVENOUS at 13:47

## 2022-03-22 RX ADMIN — CLINDAMYCIN PHOSPHATE 600 MG: 12 INJECTION, SOLUTION INTRAMUSCULAR; INTRAVENOUS at 12:59

## 2022-03-22 RX ADMIN — FENTANYL CITRATE 50 MCG: 50 INJECTION, SOLUTION INTRAMUSCULAR; INTRAVENOUS at 13:47

## 2022-03-22 RX ADMIN — MIDAZOLAM HYDROCHLORIDE 1 MG: 1 INJECTION, SOLUTION INTRAMUSCULAR; INTRAVENOUS at 14:02

## 2022-03-22 RX ADMIN — MIDAZOLAM HYDROCHLORIDE 1 MG: 1 INJECTION, SOLUTION INTRAMUSCULAR; INTRAVENOUS at 13:21

## 2022-03-22 RX ADMIN — ACETAMINOPHEN 650 MG: 325 TABLET, FILM COATED ORAL at 15:22

## 2022-03-22 NOTE — BRIEF OP NOTE (RAD/CATH)
INTERVENTIONAL RADIOLOGY PROCEDURE NOTE    Date: 3/22/2022    Procedure: IR PORT PLACEMENT    Preoperative diagnosis:   1  History of lung transplant ([facility] [address] )         Postoperative diagnosis: Same  Surgeon: Luis Carreno MD     Assistant: None  No qualified resident was available  Blood loss: 5 mL    Specimens: None     Findings:   1  Successful right chest port placement  2  Left brachiocephalic vein appeared occluded  Complications: None immediate      Anesthesia: conscious sedation and local

## 2022-03-22 NOTE — H&P
Interventional Radiology  History and Physical 3/22/2022     Fatoumata Segalcek   1975   [id number]    Assessment/Plan:  81-year-old female with history lung transplant and right pneumonectomy requires IV access and presents for chest port placement  Patient recently had right chest port removed due to infection  Patient would prefer to have left-sided chest port placed  Problem List Items Addressed This Visit     None      Visit Diagnoses     History of lung transplant ([facility] [address] )        Relevant Orders    IR port placement             Subjective:     Patient ID: Saint Spade is a 42 y o  female  History of Present Illness  Patient with history lung transplant and right pneumonectomy requires IV access and presents for chest port placement  Patient recently had right chest port removed due to infection  Patient would prefer to have left-sided chest port placed  Review of Systems      Past Medical History:   Diagnosis Date    ABPA (allergic bronchopulmonary aspergillosis) ([facility] [address] ) 7/8/2012    Acute kidney injury ([facility] [address] ) 9/8/2017    Acute on chronic respiratory failure ([facility] [address] ) 3/4/2017    Last Assessment & Plan:  Formatting of this note might be different from the original  Recurrent with clinical worsening with enlarging PTX, s/p multiple chest tubes, but new fevers 6/19 and worsening leukocytosis indicating infection as cause of 6/19 decompensation with profound hypoxia in setting of shunting through right lung and continued pneumothorax s/p 2nd chest tube placement    6/27: Right    Asthma     Chronic pain     Cystic fibrosis ([facility] [address] )     Diabetes mellitus ([facility] [address] )     GERD (gastroesophageal reflux disease)     HBP (high blood pressure)     HCAP (healthcare-associated pneumonia) 3/6/2020    Influenza B 2/23/2020    Kidney stone     Neutropenic fever ([facility] [address] ) 9/26/2020    PONV (postoperative nausea and vomiting)     Pseudomonas aeruginosa infection 9/20/2012    Transplant recipient     Lung- 2013, 2017        Past Surgical History:   Procedure Laterality Date    APPENDECTOMY      AUGMENTATION MAMMAPLASTY Bilateral 2004    AUGMENTATION MAMMAPLASTY Right 2021    removed     BREAST SURGERY      Aug      BRONCHOSCOPY      CHOLECYSTECTOMY      COLONOSCOPY      EGD  07/2019    Yeast Infect       FL RETROGRADE PYELOGRAM  1/9/2020    FL RETROGRADE PYELOGRAM  2/4/2020    GASTROSTOMY TUBE, PLACE      IR PORT REMOVAL  1/10/2022    IR TUNNELED CENTRAL LINE PLACEMENT  1/13/2022    LUNG SURGERY      LUNG TRANSPLANT      X2    IN CYSTO/URETERO W/LITHOTRIPSY &INDWELL STENT INSRT Right 2/4/2020    Procedure: CYSTOSCOPY URETEROSCOPY WITH LITHOTRIPSY HOLMIUM LASER, RETROGRADE PYELOGRAM AND INSERTION STENT URETERAL;  Surgeon: Benjamin De Paz MD;  Location: [facility];  Service: Urology    IN CYSTOURETHROSCOPY,URETER CATHETER Right 1/9/2020    Procedure: CYSTOSCOPY RETROGRADE PYELOGRAM WITH INSERTION STENT URETERAL;  Surgeon: Benjamin De Paz MD;  Location: [facility];  Service: Urology    US GUIDED VASCULAR ACCESS  1/17/2017        Social History     Tobacco Use   Smoking Status Former Smoker    Packs/day: 0 50    Years: 12 00    Pack years: 6 00   Smokeless Tobacco Never Used        Social History     Substance and Sexual Activity   Alcohol Use Yes    Comment: social        Social History     Substance and Sexual Activity   Drug Use Yes    Frequency: 2 0 times per week    Types: Marijuana    Comment: medical edible marijuana prescribed        Allergies   Allergen Reactions    Vancomycin Angioedema and Hives    Ceftazidime Headache     Severe headaches after desensitization       Current Outpatient Medications   Medication Sig Dispense Refill    acetaminophen (TYLENOL) 325 mg tablet Take 3 tablets (975 mg total) by mouth every 8 (eight) hours (Patient taking differently: Take 975 mg by mouth every 4 (four) hours as needed  )  0    acetylcysteine (MUCOMYST) 200 mg/mL nebulizer solution Take 2 mL (400 mg total) by nebulization 2 (two) times a day (Patient taking differently: Take 2 mL by nebulization 2 (two) times a day as needed  )  0    albuterol (2 5 mg/3 mL) 0 083 % nebulizer solution Take 6 mL (5 mg total) by nebulization 2 (two) times a day (Patient taking differently: Take 5 mg by nebulization every 4 (four) hours as needed  )  0    ALPRAZolam (XANAX) 1 mg tablet Take 1 tablet (1 mg total) by mouth 2 (two) times a day (Patient taking differently: Take 1 mg by mouth daily at bedtime as needed  ) 60 tablet 1    apixaban (ELIQUIS) 5 mg Take 1 tablet (5 mg total) by mouth 2 (two) times a day  0    atovaquone (MEPRON) 750 mg/5 mL suspension Take 1,500 mg by mouth daily      capsicum (ZOSTRIX) 0 075 % topical cream Apply topically 3 (three) times a day (Patient not taking: Reported on 2/11/2022 ) 28 3 g 0    dronabinol (MARINOL) 5 MG capsule TAKE 1 CAPSULE (5 MG TOTAL) BY MOUTH 3 (THREE) TIMES A DAY (Patient taking differently: Take 5 mg by mouth 2 (two) times a day as needed  ) 90 capsule 3    famotidine (PEPCID) 20 mg tablet Take 20 mg by mouth 2 (two) times a day (Patient not taking: Reported on 2/11/2022 )      gabapentin (Neurontin) 600 MG tablet Take 1 tablet (600 mg total) by mouth 3 (three) times a day 90 tablet 1    insulin lispro (HumaLOG) 100 units/mL injection Inject 4 Units under the skin 3 (three) times a day with meals (Patient taking differently: Inject 4 Units under the skin 2 (two) times a day as needed  )  0    pancrelipase, Lip-Prot-Amyl, (CREON) 24,000 units Take 48,000 Units by mouth 3 (three) times a day with meals      pantoprazole (PROTONIX) 40 mg tablet Take 1 tablet (40 mg total) by mouth daily in the early morning (Patient not taking: Reported on 2/11/2022 )  0    polyethylene glycol (MIRALAX) 17 g packet Take 17 g by mouth 2 (two) times a day as needed      predniSONE 10 mg tablet Take 10 mg by mouth daily      Respiratory Therapy Supplies (Nebulizer) SUKHI Please dispense nebulizer machine      sodium chloride 3 % inhalation solution Inhale 4 mL 2 (two) times a day      SYRINGE-NEEDLE, DISP, 3 ML 23G X 1" 3 ML MISC Use twice daily to draw up acetylcysteine      tacrolimus (PROGRAF) 1 mg capsule Take 3 mg by mouth 2 (two) times a day      traZODone (DESYREL) 50 mg tablet Take 0 5 tablets (25 mg total) by mouth daily at bedtime (Patient taking differently: Take 25 mg by mouth daily at bedtime as needed  )  0    valACYclovir (VALTREX) 500 mg tablet Take 500 mg by mouth daily       Current Facility-Administered Medications   Medication Dose Route Frequency Provider Last Rate Last Admin    clindamycin (CLEOCIN) IVPB (premix in dextrose) 600 mg 50 mL  600 mg Intravenous Once Ab Baker MD        sodium chloride 0 9 % infusion  50 mL/hr Intravenous Continuous Ab Baker MD              Objective:    Vitals:    03/22/22 1222   BP: 169/88   Pulse: 86   Resp: 16   Temp: 97 8 °F (36 6 °C)   TempSrc: Temporal   SpO2: 100%   Weight: 47 6 kg (104 lb 15 oz)   Height: 5' 2" (1 575 m)        Physical Exam  Constitutional:       Appearance: Normal appearance  Cardiovascular:      Rate and Rhythm: Normal rate  Pulmonary:      Effort: Pulmonary effort is normal    Skin:     General: Skin is dry  No results found for: BNP   Lab Results   Component Value Date    WBC 6 57 01/14/2022    HGB 10 0 (L) 01/14/2022    HCT 34 8 01/14/2022    MCV 82 01/14/2022     01/14/2022     Lab Results   Component Value Date    INR 1 07 01/07/2022    INR 1 25 (H) 12/02/2021    INR 1 09 09/21/2021    PROTIME 13 4 01/07/2022    PROTIME 15 5 (H) 12/02/2021    PROTIME 14 3 09/21/2021     Lab Results   Component Value Date    PTT 28 01/07/2022         I have personally reviewed pertinent imaging and laboratory results  Code Status: Prior  Advance Directive and Living Will:      Power of :    POLST:      This text is generated with voice recognition software   There may be translation, syntax,  or grammatical errors  If you have any questions, please contact the dictating provider

## 2022-03-22 NOTE — DISCHARGE INSTRUCTIONS
Implanted Venous Access Port     WHAT YOU NEED TO KNOW:   An implanted venous access port is a device used to give treatments and take blood  It may also be called a central venous access device (CVAD)  The port is a small container that is placed under your skin, usually in your upper chest  The port is attached to a catheter that enters a large vein  DISCHARGE INSTRUCTIONS:   Resume your normal diet  Small sips of flat soda will help with mild nausea  Prevent an infection:   · Wash your hands often  Use soap and water  Clean your hands before and after you care for your port  Remind everyone who cares for your port to wash their hands  · Check your skin for infection every day  Look for redness, swelling, or fluid oozing from the port site  Care for your port:   1  You may shower beginning 48 hours after procedure  2   Leave glue in place  3  It is normal for some bruising to occur  4  Use Tylenol for pain  5  Limit use of arm on the side that your port was placed  Lift nothing heavier than 5 pounds for 1 week, and then gradually increase activity as tolerated  6  DO NOT apply ointment, lotion or cream to port site until incision is healed  Allow glue to fall off  DO NOT attempt to peel glue from skin even it it begins to flake  7  After the port incision is healed you may swim, bathe  Notify the Interventional Radiologist if you have any of the followin  Fever above 101 F    2  Increased redness or swelling after 1st day  3  Increased pain after 1st day  4  Any sign of infection (drainage from port site, skin separation, hot to touch)  5  Persistent nausea or vomiting      Contact Interventional Radiology at [phone]

## 2022-03-23 DIAGNOSIS — E84.9 CYSTIC FIBROSIS (HCC): ICD-10-CM

## 2022-03-23 RX ORDER — ALPRAZOLAM 1 MG/1
1 TABLET ORAL
Qty: 60 TABLET | Refills: 0 | Status: SHIPPED | OUTPATIENT
Start: 2022-03-23 | End: 2022-06-02

## 2022-03-23 NOTE — RESULT ENCOUNTER NOTE
Please let her know mammogram is negative  Based on her lifetime risk and breast density she may benefit from additional screening with automated breast ultrasound  This is not always covered by insurance and I recommend checking coverage first  If patient desires to have the additional testing she may contact our office and we can order

## 2022-03-24 ENCOUNTER — TELEPHONE (OUTPATIENT)
Dept: INTERNAL MEDICINE CLINIC | Facility: CLINIC | Age: 47
End: 2022-03-24

## 2022-03-24 NOTE — TELEPHONE ENCOUNTER
Called and spoke to Austin Early from [facility] in regards to [employer]  Asked Austin Early if the [facility] was still performing Infusions which Austin Early stated they did  Had then informed Mortimer Leber is not covid positive, yet immunocompromised  Had asked if [facility] would still be able to perform the monoclonal antibody treatment for her  Austin Early had then stated the [facility] is not able to take Zelonisgecek as they only schedule patients who are covid positive  Reina Felder for the information and ended the call

## 2022-04-06 ENCOUNTER — TELEPHONE (OUTPATIENT)
Dept: GASTROENTEROLOGY | Facility: CLINIC | Age: 47
End: 2022-04-06

## 2022-04-06 ENCOUNTER — OFFICE VISIT (OUTPATIENT)
Dept: GASTROENTEROLOGY | Facility: CLINIC | Age: 47
End: 2022-04-06
Payer: COMMERCIAL

## 2022-04-06 VITALS
SYSTOLIC BLOOD PRESSURE: 169 MMHG | DIASTOLIC BLOOD PRESSURE: 96 MMHG | BODY MASS INDEX: 19.4 KG/M2 | HEART RATE: 80 BPM | HEIGHT: 62 IN | WEIGHT: 105.4 LBS

## 2022-04-06 DIAGNOSIS — E84.9 CYSTIC FIBROSIS (HCC): ICD-10-CM

## 2022-04-06 DIAGNOSIS — D69.6 THROMBOCYTOPENIA (HCC): ICD-10-CM

## 2022-04-06 DIAGNOSIS — K86.1 CHRONIC PANCREATITIS, UNSPECIFIED PANCREATITIS TYPE (HCC): ICD-10-CM

## 2022-04-06 DIAGNOSIS — K51.00 ULCERATIVE PANCOLITIS WITHOUT COMPLICATION (HCC): ICD-10-CM

## 2022-04-06 DIAGNOSIS — K21.00 GASTROESOPHAGEAL REFLUX DISEASE WITH ESOPHAGITIS WITHOUT HEMORRHAGE: Primary | ICD-10-CM

## 2022-04-06 PROCEDURE — 99214 OFFICE O/P EST MOD 30 MIN: CPT | Performed by: INTERNAL MEDICINE

## 2022-04-06 RX ORDER — DEXLANSOPRAZOLE 60 MG/1
CAPSULE, DELAYED RELEASE ORAL
COMMUNITY
Start: 2020-03-30 | End: 2022-05-23 | Stop reason: SDUPTHER

## 2022-04-06 NOTE — PATIENT INSTRUCTIONS
Scheduled date of EGD(as of today): 4/25/22  Physician performing EGD: Compa  Location of EGD: [facility]  Instructions reviewed with patient by: Lupe   Clearances: Cardiac Clearance

## 2022-04-06 NOTE — TELEPHONE ENCOUNTER
Cardiac Clearance for Eliquis  fax to Dr Prem Fernandez MD at [phone] [facility]  Pt is to have an EGD (PRIA) at [facility] on 4/25/22 w/ Dr Fam Smith  Pt # [phone]

## 2022-04-06 NOTE — PROGRESS NOTES
Answers for HPI/ROS submitted by the patient on 3/30/2022  Chronicity: chronic  Onset: more than 1 year ago  Onset quality: gradual  Frequency: rarely  Episode duration: 2 hours  Progression since onset: rapidly improving  Pain location: generalized abdominal region  Pain - numeric: 7/10  Pain quality: aching, cramping  Radiates to: does not radiate  anorexia: No  arthralgias: No  belching: Yes  constipation: No  diarrhea: No  dysuria: No  fever: No  flatus: No  frequency: No  headaches: No  hematochezia: No  hematuria: No  melena: No  myalgias: No  nausea: No  weight loss: No  vomiting: No  Aggravated by: nothing  Relieved by: bowel movements  Diagnostic workup: CT scan    [facility] - Outpatient Follow-up Note  Yolanda Fortune 42 y o  female MRN: [id number]  Encounter: [id number]          ASSESSMENT AND PLAN:      1  Gastroesophageal reflux disease with esophagitis without hemorrhage  Patient is complaining persistent heartburn and reflux symptoms, she is taking Dexilant 60 mg p o  q day but symptoms still persist   She has a history of cystic fibrosis, status post 1 lung  Removed recently at [address] [place] [address]  She try multiple PPI and H2 blocker in the past, she is here to discuss about minimal invasive endoscopic anti-reflux surgery, TIF  Last endoscopy was 2 years ago, will plan for another endoscopy and then discuss about scheduling for esophageal manometry study and if it come back okay will try to get approval for TIF   - EGD; Future    2  Cystic fibrosis ([place] [address] )  History of cystic fibrosis with recurrent lung infection lead to bronchiectasis, status post 1 lung removal, Her respiratory status improved , she is not on any antibiotic     3  Chronic pancreatitis, unspecified pancreatitis type ([facility] )  Continue with Creon supplement    4   Right-sided colitis  Most likely infectious process with evidence of typhlitis, she was treated with antibiotic in the past, her last colonoscopy was in 2018, she will be due for repeat colonoscopy which will schedule it    5  Thrombocytopenia (HCC)  Monitor platelet count  ______________________________________________________________________    SUBJECTIVE:  Patient seen and examined, she come for follow-up after 2 years, she has a history of cystic fibrosis with recurrent lung infection and pneumonia, she had pneumonectomy with 1 long removal and after surgery she started feeling better, her oxygen level improved, she is not on home oxygen, she denies any chronic cough, no shortness of breath or chest pain  She is complaining worsening of heartburn and reflux symptoms, she denies any dysphagia or odynophagia, she is taking Dexilant every day but no improvement in the symptoms, she is here to discuss about minimal invasive endoscopic anti-reflux surgery, TIF  Her bowel movements are regular, she denies any chronic diarrhea or any rectal bleeding, she is also due for colonoscopy, last colonoscopy was in 2018      REVIEW OF SYSTEMS IS OTHERWISE NEGATIVE  Historical Information   Past Medical History:   Diagnosis Date    ABPA (allergic bronchopulmonary aspergillosis) ([facility] ) 7/8/2012    Acute kidney injury ([facility] ) 9/8/2017    Acute on chronic respiratory failure ([facility] ) 3/4/2017    Last Assessment & Plan:  Formatting of this note might be different from the original  Recurrent with clinical worsening with enlarging PTX, s/p multiple chest tubes, but new fevers 6/19 and worsening leukocytosis indicating infection as cause of 6/19 decompensation with profound hypoxia in setting of shunting through right lung and continued pneumothorax s/p 2nd chest tube placement    6/27: Right    Asthma     Chronic pain     Cystic fibrosis ([address] )     Diabetes mellitus ([address] )     GERD (gastroesophageal reflux disease)     HBP (high blood pressure)     HCAP (healthcare-associated pneumonia) 3/6/2020    Influenza B 2/23/2020    Kidney stone     Neutropenic fever ([address] ) 9/26/2020    PONV (postoperative nausea and vomiting)     Pseudomonas aeruginosa infection 9/20/2012    Transplant recipient     Lung- 2013, 2017     Past Surgical History:   Procedure Laterality Date    APPENDECTOMY      AUGMENTATION MAMMAPLASTY Bilateral 2004    AUGMENTATION MAMMAPLASTY Right 2021    removed     BREAST SURGERY      Aug      BRONCHOSCOPY      CHOLECYSTECTOMY      COLONOSCOPY      EGD  07/2019    Yeast Infect       FL RETROGRADE PYELOGRAM  1/9/2020    FL RETROGRADE PYELOGRAM  2/4/2020    GASTROSTOMY TUBE, PLACE      IR PORT PLACEMENT  3/22/2022    IR PORT REMOVAL  1/10/2022    IR TUNNELED CENTRAL LINE PLACEMENT  1/13/2022    LUNG SURGERY      LUNG TRANSPLANT      X2    IA CYSTO/URETERO W/LITHOTRIPSY &INDWELL STENT INSRT Right 2/4/2020    Procedure: CYSTOSCOPY URETEROSCOPY WITH LITHOTRIPSY HOLMIUM LASER, RETROGRADE PYELOGRAM AND INSERTION STENT URETERAL;  Surgeon: Vanesa Leigh MD;  Location: [facility];  Service: Urology    IA CYSTOURETHROSCOPY,URETER CATHETER Right 1/9/2020    Procedure: CYSTOSCOPY RETROGRADE PYELOGRAM WITH INSERTION STENT URETERAL;  Surgeon: Vanesa Leigh MD;  Location: [facility];  Service: Urology    US GUIDED VASCULAR ACCESS  1/17/2017     Social History   Social History     Substance and Sexual Activity   Alcohol Use Yes    Comment: social     Social History     Substance and Sexual Activity   Drug Use Yes    Frequency: 2 0 times per week    Types: Marijuana    Comment: medical edible marijuana prescribed     Social History     Tobacco Use   Smoking Status Former Smoker    Packs/day: 0 50    Years: 12 00    Pack years: 6 00   Smokeless Tobacco Never Used     Family History   Problem Relation Age of Onset    Alcohol abuse Mother     Mental illness Mother     Alcohol abuse Father     COPD Maternal Grandmother     Lung cancer Maternal Grandmother 72    COPD Maternal Grandfather     COPD Paternal Grandmother     Lung cancer Paternal Grandmother 72    No Known Problems Sister     No Known Problems Paternal Aunt     Breast cancer Neg Hx     Ovarian cancer Neg Hx     Colon cancer Neg Hx        Meds/Allergies       Current Outpatient Medications:     acetaminophen (TYLENOL) 325 mg tablet    albuterol (2 5 mg/3 mL) 0 083 % nebulizer solution    ALPRAZolam (XANAX) 1 mg tablet    apixaban (ELIQUIS) 5 mg    atovaquone (MEPRON) 750 mg/5 mL suspension    dexlansoprazole (Dexilant) 60 MG capsule    dronabinol (MARINOL) 5 MG capsule    gabapentin (Neurontin) 600 MG tablet    insulin lispro (HumaLOG) 100 units/mL injection    pancrelipase, Lip-Prot-Amyl, (CREON) 24,000 units    predniSONE 10 mg tablet    Respiratory Therapy Supplies (Nebulizer) SUKHI    tacrolimus (PROGRAF) 1 mg capsule    traZODone (DESYREL) 50 mg tablet    valACYclovir (VALTREX) 500 mg tablet    acetylcysteine (MUCOMYST) 200 mg/mL nebulizer solution    polyethylene glycol (MIRALAX) 17 g packet    sodium chloride 3 % inhalation solution    SYRINGE-NEEDLE, DISP, 3 ML 23G X 1" 3 ML MISC    Allergies   Allergen Reactions    Vancomycin Angioedema and Hives    Gabapentin Hallucinations    Ceftazidime Headache     Severe headaches after desensitization    Nsaids Other (See Comments)     Lung transplant increases risk of renal toxicity, call lung txp provider to discuss if needed           Objective     Blood pressure 169/96, pulse 80, height 5' 2" (1 575 m), weight 47 8 kg (105 lb 6 4 oz), last menstrual period 03/11/2022, not currently breastfeeding  Body mass index is 19 28 kg/m²  PHYSICAL EXAM:      General Appearance:   Alert, cooperative, no distress   HEENT:   Normocephalic, atraumatic, anicteric      Neck:  Supple, symmetrical, trachea midline   Lungs:   Clear to auscultation bilaterally; no rales, rhonchi or wheezing; respirations unlabored    Heart[de-identified]   Regular rate and rhythm; no murmur, rub, or gallop     Abdomen:   Soft, non-tender, non-distended; normal bowel sounds; no masses, no organomegaly    Genitalia:   Deferred    Rectal:   Deferred    Extremities:  No cyanosis, clubbing or edema    Pulses:  2+ and symmetric    Skin:  No jaundice, rashes, or lesions    Lymph nodes:  No palpable cervical lymphadenopathy        Lab Results:   No visits with results within 1 Day(s) from this visit  Latest known visit with results is:   Hospital Outpatient Visit on 03/22/2022   Component Date Value    EXT Preg Test, Ur 03/22/2022 Negative     Control 03/22/2022 Valid          Radiology Results:   MRI foot/forefoot toes right wo contrast    Result Date: 3/8/2022  Narrative: MRI RIGHT FOOT INDICATION:   R29 898: Other symptoms and signs involving the musculoskeletal system M79 671: Pain in right foot Z79 52: Long term (current) use of systemic steroids  Right foot pain and weakness  Clinical concern for stress fracture  COMPARISON: X-ray right foot dated December 2, 2021  TECHNIQUE:  MR sequences were obtained of the right foot including the following:  Localizer, sagittal T1/STIR, coronal T1/T2 fat/ sat/STIR, axial T2 fat sat/STIR/PD  Images were acquired on a 1 5 Barbara unit  Imaging performed on 1 5T MRI Gadolinium was not used FINDINGS: SUBCUTANEOUS TISSUES: Normal BONES:  Unremarkable  Specifically, no evidence of stress fracture  No focal bone marrow edema, T1 marrow signal hypointensity or cortical destruction is seen to suggest osteomyelitis  FIRST MTP JOINT:  Intact  SESAMOID BONES:  Intact  OTHER ARTICULAR SURFACE:  Normal  PLANTAR FASCIA:  Intact  LISFRANC LIGAMENT:  Intact  FOREFOOT TENDONS: Intact  INTERMETATARSAL REGIONS: No bursitis or Redmond's neuroma  MUSCULATURE: Diffuse edema of the intrinsic foot muscles, 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 (8/17)  Impression: Diffuse edema of the intrinsic foot muscles, 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  Otherwise, no acute or suspicious osseous abnormality  No evidence of stress fracture  Workstation performed: [id number]     Mammo screening bilateral w 3d & cad    Result Date: 3/23/2022  Narrative: DIAGNOSIS: Encounter for screening mammogram for malignant neoplasm of breast TECHNIQUE: Digital screening mammography was performed  Computer Aided Detection (CAD) analyzed all applicable images  COMPARISONS: Prior breast imaging dated: 10/24/2018 and 09/22/2017 RELEVANT HISTORY: Family Breast Cancer History: History of breast cancer in Neg Hx  Family Medical History: No known relevant family medical history  Personal History: Hormone history includes birth control  Surgical history includes breast enhancement  No known relevant medical history  The patient is scheduled in a reminder system for screening mammography  8-10% of cancers will be missed on mammography  Management of a palpable abnormality must be based on clinical grounds  Patients will be notified of their results via letter from our facility  Accredited by [employer] of [employer] and FDA  RISK ASSESSMENT: 5 Year Tyrer-Cuzick: 0 99 % 10 Year Tyrer-Cuzick: 2 33 % Lifetime Tyrer-Cuzick: 13 53 % TISSUE DENSITY: The breasts are extremely dense, which lowers the sensitivity of mammography  INDICATION: Christal Roman is a 42 y o  female presenting for screening mammography  FINDINGS: Bilateral There are no suspicious masses, grouped microcalcifications or areas of unexplained architectural distortion  The skin and nipple areolar complex are unremarkable  Scattered calcifications bilaterally are without significant change given differences in technique  There is a intact left-sided retropectoral saline implant  Right-sided implant has been removed in the interval       Impression: No mammographic evidence of malignancy  ASSESSMENT/BI-RADS CATEGORY: Left: 2 - Benign Right: 2 - Benign Overall: 2 - Benign RECOMMENDATION:      - Routine screening mammogram in 1 year for both breasts   Workstation ID: [id number]      port placement    Result Date: 3/22/2022  Narrative: PROCEDURE: Right chest port placement Procedural Personnel Attending physician(s): Dr Moustapha Bonds Pre-procedure diagnosis: Cystic fibrosis Post-procedure diagnosis: Same Indication: Patient with history of lung transplant status post right pneumonectomy had a right-sided chest port removed due to infection  Patient returns for a new port placement for IV medications and blood draws  PROCEDURE SUMMARY: - Venous access with ultrasound guidance - Tunneled port insertion under fluoroscopic guidance - Left IJV venogram Pre-procedure Consent: Informed consent for the procedure including risks, benefits and alternatives was obtained and time-out was performed prior to the procedure  Preparation: The site was prepared and draped using all elements of maximal sterile barrier technique including sterile gloves, sterile gown, cap, mask, large sterile sheet, sterile ultrasound probe cover, hand hygiene and cutaneous antisepsis with 2% chlorhexidine  Anesthesia/sedation Level of anesthesia/sedation: Moderate sedation (conscious sedation) Anesthesia/sedation administered by: IR nurse under attending supervision with continuous monitoring of the patient's level of consciousness and physiologic status Total intra-service sedation time (minutes): 100 Port placement Under ultrasound guidance, a 21-gauge needle was used to gain access into the left IJV  A 0 018 inch wire was attempted to be advanced through the needle into the SVC  Wire met resistance  Venogram was performed through the access needle which showed occluded left brachiocephalic vein  Decision was made to abort left-sided chest port placement and to place a right-sided chest port  Under ultrasound guidance, a 21-gauge needle was used to gain access into the right IJV   0 018 inch wire was used to measure length needed for catheter  A 3 cm incision was made overlying the right chest wall    Port pocket was developed in a Medcomp Dignity single-lumen port with 8-Bel[place]ian catheter was placed snugly into the port pocket  Port catheter was tunneled in the subcutaneous tissues and brought out at the right IJV access site  Peel-away sheath was advanced through the access site and catheter advanced through the peel-away sheath  Final positioning of the port and catheter was verified under fluoroscopy  Port was found to aspirate and flush easily  Port pocket was approximated using 3-0 Vicryl suture and skin closed using 4-0 Monocryl suture  Skin adhesive was applied  Contrast Contrast agent: Omnipaque Contrast volume (mL): 20 Radiation Dose Fluoroscopy time (mm:ss): 03:25 5  Reference air kerma (mGy): 20 39 Kerma area product (Gy-cm2): 7 2006 Additional Details Estimated blood loss (mL): 5 Complications: No immediate complications  Impression: 1  Left IJV venogram showed the left brachiocephalic vein to be occluded  Initial attempt to place a left-sided chest port was aborted  2   Insertion of right-sided power-injectable single-lumen tunneled chest port, with catheter tip in the expected location of the cavoatrial junction  Plan: The port may be used immediately   Workstation performed: [id number]

## 2022-04-07 NOTE — TELEPHONE ENCOUNTER
Received call from Agus Yusuf of [address] [place] [address] Dr that this is a Pulmonary office and that they can clear her for Eliquis as they prescribe, however, a new form needs to be send to them without anything stating cardiac  Please resend Eliquis Clearance  If you have any questions, please call her back at [phone]  Thank you!

## 2022-04-11 NOTE — PROGRESS NOTES
Assessment:  1  Chronic pain syndrome    2  Right foot pain    3  Lumbar radiculopathy        Plan:  Orders Placed This Encounter   Procedures    Ambulatory referral to Physical Therapy     Standing Status:   Future     Standing Expiration Date:   4/12/2023     Referral Priority:   Routine     Referral Type:   Physical Therapy     Referral Reason:   Specialty Services Required     Requested Specialty:   Physical Therapy     Number of Visits Requested:   1     Expiration Date:   4/12/2023       New Medications Ordered This Visit   Medications    gabapentin (NEURONTIN) 100 mg capsule     Sig: Take 200 mg by mouth 2 (two) times a day    topiramate (Topamax) 50 MG tablet     Sig: Take 1 tablet (50 mg total) by mouth 2 (two) times a day     Dispense:  60 tablet     Refill:  1     My impressions and treatment recommendations were discussed in detail with the patient, who verbalized understanding and had no further questions  The patient is complaining of right foot pain as her primary pain complaint  She initially stated that her symptoms involved her entire right lower extremity from her right hip all the way through her right foot  Her symptoms did appear to be in the right L5 and S1 distribution  At this point, I would like to consider the possibility of a lumbar radiculopathy as the source of her right foot pain  I did feel reasonable to have the patient undergo a course of physical therapy 2-3 times per week for 4-6 weeks for her lumbar radicular symptoms  If she does not respond to the physical therapy, I will then obtain a MRI of the lumbar spine to evaluate for any pathology that may be contributing to her pain complaints  In addition, the patient is unable to tolerate the side effects of gabapentin and is currently weaning off of this medication through her neurologist   I felt a reasonable to trial the patient on topiramate 50 mg twice daily    Side effects were reviewed with the patient  Follow-up is planned in 6 weeks time or sooner as warranted  Discharge instructions were provided  I personally saw and examined the patient and I agree with the above discussed plan of care  History of Present Illness:    Orville Apodaca is a 42 y o  female who presents to [facility] for initial evaluation of the above stated pain complaints  The patient has a past medical and chronic pain history as outlined in the assessment section  She was referred by [facility], [address] [place] [address]  The patient reports a 5 month history of right foot pain  She describes her pain as starting after a pneumonectomy on October 2021 she describes her pain as moderate to severe and 7/10 on the verbal numerical pain rating scale  Her pain is nearly constant in nature  Her pain is worse at night  She describes her pain as shooting, sharp, electrifying, and pins and needles  She reports weakness in her right foot  She does not ambulate with any assistive devices  Standing, walking, and exercise decreases pain  Lying down, sitting, coughing, sneezing, bowel movements, and menstruation increases pain  Physical therapy, home exercises, and heat/ice treatment provided moderate pain relief  The patient does drink 1-2 alcoholic beverages per week  Gabapentin was helpful for her symptoms, but she could not tolerate the side effects  Lyrica was used in the past without clinical efficacy  Oral steroids, alprazolam are being used currently  Acetaminophen, lidocaine patch, capsaicin, oxycodone, and Voltaren gel were used in the past     Review of Systems:    Review of Systems   Constitutional: Negative for fever and unexpected weight change  HENT: Negative for trouble swallowing  Eyes: Negative for visual disturbance  Respiratory: Negative for shortness of breath and wheezing  Cardiovascular: Negative for chest pain and palpitations     Gastrointestinal: Negative for constipation, diarrhea, nausea and vomiting  Endocrine: Negative for cold intolerance, heat intolerance and polydipsia  Genitourinary: Negative for difficulty urinating and frequency  Musculoskeletal: Negative for arthralgias, gait problem, joint swelling and myalgias  Skin: Negative for rash  Neurological: Positive for numbness  Negative for dizziness, seizures, syncope, weakness and headaches  Hematological: Does not bruise/bleed easily  Psychiatric/Behavioral: Negative for dysphoric mood  All other systems reviewed and are negative          Patient Active Problem List   Diagnosis    Lung transplant status, bilateral ([facility] )    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, with long-term current use of insulin (HCC)    Abnormal CT scan, colitis    Pancreatic insufficiency    Asthma    Chronic pain disorder    [facility] thrombosis[facility]    Acute on chronic anemia    Long term current use of bisphosphonates    Long term current use of systemic steroids    Osteoporosis    Anxiety    Chronic kidney disease    Chronic sinusitis    CMV (cytomegalovirus infection) status negative    Colon polyp    Cytomegalovirus (CMV) viremia (HCC)    Esophageal reflux    Hiatal hernia    Immunosuppression (HCC)    Thrombocytopenia (HCC)    Severe protein-calorie malnutrition (HCC)    Transplant rejection    Cystic fibrosis ([facility] )    Essential hypertension    Hypercholesteremia    Chronic respiratory failure with hypoxia (HCC)    Sepsis (HCC)    Fatigue    Chest pain    Weakness of right lower extremity    Gram-positive cocci bacteremia    Moderate protein-calorie malnutrition (HCC)    Neuropathic pain of right foot    Right foot pain    Ulcerative pancolitis without complication ([facility] )       Past Medical History:   Diagnosis Date    ABPA (allergic bronchopulmonary aspergillosis) ([facility] ) 7/8/2012    Acute kidney injury ([place] [address] ) 9/8/2017    Acute on chronic respiratory failure ([facility] ) 3/4/2017    Last Assessment & Plan:  Formatting of this note might be different from the original  Recurrent with clinical worsening with enlarging PTX, s/p multiple chest tubes, but new fevers 6/19 and worsening leukocytosis indicating infection as cause of 6/19 decompensation with profound hypoxia in setting of shunting through right lung and continued pneumothorax s/p 2nd chest tube placement  6/27: Right    Asthma     Chronic pain     Cystic fibrosis ([facility] )     Diabetes mellitus ([facility] )     GERD (gastroesophageal reflux disease)     HBP (high blood pressure)     HCAP (healthcare-associated pneumonia) 3/6/2020    Influenza B 2/23/2020    Kidney stone     Neutropenic fever ([facility] ) 9/26/2020    PONV (postoperative nausea and vomiting)     Pseudomonas aeruginosa infection 9/20/2012    Transplant recipient     Lung- 2013, 2017       Past Surgical History:   Procedure Laterality Date    APPENDECTOMY      AUGMENTATION MAMMAPLASTY Bilateral 2004    AUGMENTATION MAMMAPLASTY Right 2021    removed     BREAST SURGERY      Aug      BRONCHOSCOPY      CHOLECYSTECTOMY      COLONOSCOPY      EGD  07/2019    Yeast Infect       FL RETROGRADE PYELOGRAM  1/9/2020    FL RETROGRADE PYELOGRAM  2/4/2020    GASTROSTOMY TUBE, PLACE      IR PORT PLACEMENT  3/22/2022    IR PORT REMOVAL  1/10/2022    IR TUNNELED CENTRAL LINE PLACEMENT  1/13/2022    LUNG SURGERY      LUNG TRANSPLANT      X2    MO CYSTO/URETERO W/LITHOTRIPSY &INDWELL STENT INSRT Right 2/4/2020    Procedure: CYSTOSCOPY URETEROSCOPY WITH LITHOTRIPSY HOLMIUM LASER, RETROGRADE PYELOGRAM AND INSERTION STENT URETERAL;  Surgeon: Suzie Hampton MD;  Location: [facility];  Service: Urology    MO CYSTOURETHROSCOPY,URETER CATHETER Right 1/9/2020    Procedure: CYSTOSCOPY RETROGRADE PYELOGRAM WITH INSERTION STENT URETERAL;  Surgeon: Suzie Hampton MD;  Location: [facility];  Service: Urology   [address] [place] [address] 1/17/2017       Family History   Problem Relation Age of Onset    Alcohol abuse Mother     Mental illness Mother     Alcohol abuse Father     COPD Maternal Grandmother     Lung cancer Maternal Grandmother 72    COPD Maternal Grandfather     COPD Paternal Grandmother     Lung cancer Paternal Grandmother 72    No Known Problems Sister     No Known Problems Paternal Aunt     Breast cancer Neg Hx     Ovarian cancer Neg Hx     Colon cancer Neg Hx        Social History     Occupational History    Occupation: Disabled    Tobacco Use    Smoking status: Former Smoker     Packs/day: 0 50     Years: 12 00     Pack years: 6 00    Smokeless tobacco: Never Used   Vaping Use    Vaping Use: Never used   Substance and Sexual Activity    Alcohol use: Yes     Comment: social    Drug use: Yes     Frequency: 2 0 times per week     Types: Marijuana     Comment: medical edible marijuana prescribed    Sexual activity: Yes     Partners: Male     Birth control/protection: None         Current Outpatient Medications:     acetaminophen (TYLENOL) 325 mg tablet, Take 3 tablets (975 mg total) by mouth every 8 (eight) hours (Patient taking differently: Take 975 mg by mouth every 4 (four) hours as needed  ), Disp: , Rfl: 0    albuterol (2 5 mg/3 mL) 0 083 % nebulizer solution, Take 6 mL (5 mg total) by nebulization 2 (two) times a day (Patient taking differently: Take 5 mg by nebulization every 4 (four) hours as needed  ), Disp: , Rfl: 0    ALPRAZolam (XANAX) 1 mg tablet, Take 1 tablet (1 mg total) by mouth daily at bedtime as needed for anxiety or sleep, Disp: 60 tablet, Rfl: 0    apixaban (ELIQUIS) 5 mg, Take 1 tablet (5 mg total) by mouth 2 (two) times a day, Disp: , Rfl: 0    atovaquone (MEPRON) 750 mg/5 mL suspension, Take 1,500 mg by mouth daily, Disp: , Rfl:     dexlansoprazole (Dexilant) 60 MG capsule, , Disp: , Rfl:     dronabinol (MARINOL) 5 MG capsule, TAKE 1 CAPSULE (5 MG TOTAL) BY MOUTH 3 (THREE) TIMES A DAY (Patient taking differently: Take 5 mg by mouth 2 (two) times a day as needed  ), Disp: 90 capsule, Rfl: 3    gabapentin (NEURONTIN) 100 mg capsule, Take 200 mg by mouth 2 (two) times a day, Disp: , Rfl:     insulin lispro (HumaLOG) 100 units/mL injection, Inject 4 Units under the skin 3 (three) times a day with meals (Patient taking differently: Inject 4 Units under the skin 2 (two) times a day as needed  ), Disp: , Rfl: 0    pancrelipase, Lip-Prot-Amyl, (CREON) 24,000 units, Take 48,000 Units by mouth 3 (three) times a day with meals, Disp: , Rfl:     predniSONE 10 mg tablet, Take 10 mg by mouth daily, Disp: , Rfl:     Respiratory Therapy Supplies (Nebulizer) SUKHI, Please dispense nebulizer machine, Disp: , Rfl:     tacrolimus (PROGRAF) 1 mg capsule, Take 3 mg by mouth 2 (two) times a day, Disp: , Rfl:     traZODone (DESYREL) 50 mg tablet, Take 0 5 tablets (25 mg total) by mouth daily at bedtime (Patient taking differently: Take 25 mg by mouth daily at bedtime as needed  ), Disp: , Rfl: 0    valACYclovir (VALTREX) 500 mg tablet, Take 500 mg by mouth daily, Disp: , Rfl:     topiramate (Topamax) 50 MG tablet, Take 1 tablet (50 mg total) by mouth 2 (two) times a day, Disp: 60 tablet, Rfl: 1    Allergies   Allergen Reactions    Vancomycin Angioedema and Hives    Gabapentin Hallucinations    Ceftazidime Headache     Severe headaches after desensitization    Nsaids Other (See Comments)     Lung transplant increases risk of renal toxicity, call lung txp provider to discuss if needed       Physical Exam:    /99   Pulse 78   Resp 18   Ht 5' 2" (1 575 m)   Wt 47 9 kg (105 lb 9 6 oz)   BMI 19 31 kg/m²     Constitutional: normal, well developed, well nourished, alert, in no distress and non-toxic and no overt pain behavior    Eyes: anicteric  HEENT: grossly intact  Neck: supple, symmetric, trachea midline and no masses   Pulmonary:even and unlabored  Cardiovascular:No edema or pitting edema present  Skin:Normal without rashes or lesions and well hydrated  Psychiatric:Mood and affect appropriate  Neurologic:Cranial Nerves II-XII grossly intact  Musculoskeletal:normal , decreased sensation noted about the right foot  Muscle strength is 5/5 in all major proximal distal muscle groups of the bilateral lower extremities  Sensation is grossly intact otherwise  Imaging  No orders to display   MRI THORACIC SPINE WITH AND WITHOUT CONTRAST  01/08/2022     INDICATION: Gram-positive bacteremia with no clear source  Back pain  Concern for occult spinal infection      COMPARISON:  None      TECHNIQUE:  Sagittal T1, sagittal T2, sagittal inversion recovery, axial T2,  axial 2D MERGE  Sagittal and axial T1 postcontrast      IV Contrast:  4 mL of Gadobutrol injection (SINGLE-DOSE)      IMAGE QUALITY:  Diagnostic      FINDINGS:     ALIGNMENT:  Normal alignment of the thoracic spine  No compression fracture  No subluxation  No evidence of scoliosis      MARROW SIGNAL:  Normal marrow signal is identified within the visualized bony structures  No discrete marrow lesion      THORACIC CORD:  Normal signal within the thoracic cord      PREVERTEBRAL AND PARASPINAL SOFT TISSUES:   Complete opacification of the right hemithorax shift of cardiomediastinal structures towards the right similar to recent CT scan  Please see CT report  There is a somewhat loculated, complex effusion on the   right  Small left-sided effusion      THORACIC DEGENERATIVE CHANGE:  No disc herniation, canal stenosis or foraminal narrowing  No degenerative changes      POSTCONTRAST:  No abnormal enhancement      IMPRESSION:     Normal enhanced MRI of the thoracic spine      Postoperative change within the right chest status post pneumonectomy with mildly complex effusion      Orders Placed This Encounter   Procedures    Ambulatory referral to Physical Therapy

## 2022-04-11 NOTE — TELEPHONE ENCOUNTER
Render Christians I just faxed you her form  I just went into [employer] and went into pt's chart and created clearance form (cardiac clearance and changed some things on there for you)  You can use that to fax to the Pulmonary office for her Eliquis clearance  Thank you!

## 2022-04-12 ENCOUNTER — CONSULT (OUTPATIENT)
Dept: PAIN MEDICINE | Facility: CLINIC | Age: 47
End: 2022-04-12
Payer: COMMERCIAL

## 2022-04-12 VITALS
RESPIRATION RATE: 18 BRPM | HEART RATE: 78 BPM | WEIGHT: 105.6 LBS | BODY MASS INDEX: 19.43 KG/M2 | SYSTOLIC BLOOD PRESSURE: 160 MMHG | HEIGHT: 62 IN | DIASTOLIC BLOOD PRESSURE: 99 MMHG

## 2022-04-12 DIAGNOSIS — M54.16 LUMBAR RADICULOPATHY: ICD-10-CM

## 2022-04-12 DIAGNOSIS — M79.671 RIGHT FOOT PAIN: ICD-10-CM

## 2022-04-12 DIAGNOSIS — G89.4 CHRONIC PAIN SYNDROME: Primary | ICD-10-CM

## 2022-04-12 PROCEDURE — 99204 OFFICE O/P NEW MOD 45 MIN: CPT | Performed by: ANESTHESIOLOGY

## 2022-04-12 PROCEDURE — 3008F BODY MASS INDEX DOCD: CPT | Performed by: ANESTHESIOLOGY

## 2022-04-12 PROCEDURE — 1036F TOBACCO NON-USER: CPT | Performed by: ANESTHESIOLOGY

## 2022-04-12 RX ORDER — TOPIRAMATE 50 MG/1
50 TABLET, FILM COATED ORAL 2 TIMES DAILY
Qty: 60 TABLET | Refills: 1 | Status: SHIPPED | OUTPATIENT
Start: 2022-04-12 | End: 2022-05-13 | Stop reason: SDUPTHER

## 2022-04-12 RX ORDER — GABAPENTIN 100 MG/1
200 CAPSULE ORAL 2 TIMES DAILY
COMMUNITY
End: 2022-05-17 | Stop reason: ALTCHOICE

## 2022-04-12 NOTE — PATIENT INSTRUCTIONS
Topiramate (By mouth)   Topiramate (toe-PIR-a-mate)  Treats and prevents seizures  Also prevents migraine headaches  Brand Name(s): Eprontia, Qudexy XR, Topamax, Trokendi XR   There may be other brand names for this medicine  When This Medicine Should Not Be Used: This medicine is not right for everyone  Do not use it if you had an allergic reaction to topiramate, or if you are pregnant  How to Use This Medicine:   Capsule, Long Acting Capsule, Liquid, Tablet  · Take your medicine as directed  Your dose may need to be changed several times to find what works best for you  · Capsule or extended-release capsule: Do not crush or chew the capsule  Swallow whole or open the capsule and sprinkle the contents into a small amount (1 teaspoon) of soft food (including applesauce)  Swallow the mixture right away without chewing  Do not save for later use  · Tablet: Swallow whole  Do not break, crush, or chew it  It has a very bitter taste  · Oral liquid: Measure the oral liquid medicine with a marked measuring spoon, oral syringe, or medicine cup  · Drink extra fluids so you will urinate more often and help prevent kidney problems  · This medicine should come with a Medication Guide  Ask your pharmacist for a copy if you do not have one  · Missed dose: Take a dose as soon as you remember  If it is almost time for your next dose, wait until then and take a regular dose  Do not take extra medicine to make up for a missed dose  If you miss a dose of Topamax®  or Eprontia and it is within 6 hours until your next regular dose, skip the missed dose and take your next dose at the regular time  If you miss more than 1 dose of Topamax® or Eprontia, call your doctor for instructions  · Store the medicine in a closed container at room temperature, away from heat, moisture, and direct light  Throw away any unused oral liquid 30 days after first opening    Drugs and Foods to Avoid:   Ask your doctor or pharmacist before using any other medicine, including over-the-counter medicines, vitamins, and herbal products  · Some medicines can affect how topiramate works  Tell your doctor if you are using any of the following:  ? Acetazolamide, amitriptyline, dichloralphenazone, dichlorphenamide, digoxin, hydrochlorothiazide, lithium, zonisamide  ? Birth control pills  ? Oral diabetes medicine (including metformin, pioglitazone)  ? Other medicines to treat seizures (including carbamazepine, phenytoin, valproic acid)  · Do not drink alcohol with Qudexy XR or Topamax®  Do not drink alcohol for 6 hours before and 6 hours after you take the Trokendi XR capsule  · Tell your doctor if you use anything else that makes you sleepy  Some examples are allergy medicine, narcotic pain medicine, and alcohol  Warnings While Using This Medicine:   · It is not safe to take this medicine during pregnancy  It could harm an unborn baby  Tell your doctor right away if you become pregnant  · Tell your doctor if you are breastfeeding, or if you have kidney disease, liver disease, glaucoma, lung or breathing problems, osteoporosis, or a history of depression or mood disorders  Tell your doctor if you are on a ketogenic diet (high in fat and low in carbohydrates)  · This medicine may cause the following problems:  ? Eye pain or vision changes, including glaucoma  ? Changes in body temperature  ? Metabolic acidosis (too much acid in the blood)  ? Changes in mood or behavior, including thoughts of suicide  ? Serious skin reactions, including Dillon-Surya syndrome, toxic epidermal necrolysis  ? Kidney stones  · This medicine may make you dizzy, drowsy, or tired  Do not drive or do anything else that could be dangerous until you know how this medicine affects you  · Do not stop using this medicine suddenly  Your doctor will need to slowly decrease your dose before you stop it completely    · Your doctor will do lab tests at regular visits to check on the effects of this medicine  Keep all appointments  · Keep all medicine out of the reach of children  Never share your medicine with anyone  Possible Side Effects While Using This Medicine:   Call your doctor right away if you notice any of these side effects:  · Allergic reaction: Itching or hives, swelling in your face or hands, swelling or tingling in your mouth or throat, chest tightness, trouble breathing  · Blistering, peeling, red skin rash  · Bloody or cloudy urine, painful urination, sudden lower back or stomach pain  · Changes in vision, eye pain  · Confusion, problems with walking, clumsiness, dizziness, trouble talking, concentrating, or remembering  · Feeling agitated, depressed, nervous, or irritable, thoughts of hurting yourself or others, unusual mood or behavior  · Fever, decreased sweating  · Numbness, tingling, or burning pain in your hands, arms, legs, or feet  · Rapid, deep breathing, fast or uneven heartbeat  · Unusual drowsiness, tiredness, or weakness  If you notice these less serious side effects, talk with your doctor:   · Change in taste  · Diarrhea, nausea, vomiting  · Stuffy or runny nose  · Loss of appetite, weight loss  If you notice other side effects that you think are caused by this medicine, tell your doctor  Call your doctor for medical advice about side effects  You may report side effects to FDA at 5-944-FDA-5471    © Copyright [employer] 2022 Information is for End User's use only and may not be sold, redistributed or otherwise used for commercial purposes  The above information is an  only  It is not intended as medical advice for individual conditions or treatments  Talk to your doctor, nurse or pharmacist before following any medical regimen to see if it is safe and effective for you

## 2022-04-18 ENCOUNTER — HOSPITAL ENCOUNTER (OUTPATIENT)
Dept: RADIOLOGY | Facility: HOSPITAL | Age: 47
Discharge: HOME | End: 2022-04-18
Payer: COMMERCIAL

## 2022-04-18 DIAGNOSIS — M79.671 PAIN IN RIGHT FOOT: ICD-10-CM

## 2022-04-18 DIAGNOSIS — M79.2 NEUROPATHIC PAIN OF RIGHT FOOT: ICD-10-CM

## 2022-04-18 PROCEDURE — 76882 US LMTD JT/FCL EVL NVASC XTR: CPT

## 2022-04-25 ENCOUNTER — ANESTHESIA (OUTPATIENT)
Dept: GASTROENTEROLOGY | Facility: HOSPITAL | Age: 47
End: 2022-04-25

## 2022-04-25 ENCOUNTER — ANESTHESIA EVENT (OUTPATIENT)
Dept: GASTROENTEROLOGY | Facility: HOSPITAL | Age: 47
End: 2022-04-25

## 2022-04-25 ENCOUNTER — HOSPITAL ENCOUNTER (OUTPATIENT)
Dept: GASTROENTEROLOGY | Facility: HOSPITAL | Age: 47
Setting detail: OUTPATIENT SURGERY
Discharge: HOME | End: 2022-04-25
Attending: INTERNAL MEDICINE
Payer: COMMERCIAL

## 2022-04-25 VITALS
OXYGEN SATURATION: 98 % | SYSTOLIC BLOOD PRESSURE: 115 MMHG | HEART RATE: 83 BPM | DIASTOLIC BLOOD PRESSURE: 70 MMHG | WEIGHT: 103 LBS | TEMPERATURE: 96.9 F | RESPIRATION RATE: 16 BRPM | BODY MASS INDEX: 18.84 KG/M2

## 2022-04-25 DIAGNOSIS — B37.81 CANDIDA ESOPHAGITIS (HCC): Primary | ICD-10-CM

## 2022-04-25 DIAGNOSIS — K21.00 GASTROESOPHAGEAL REFLUX DISEASE WITH ESOPHAGITIS WITHOUT HEMORRHAGE: ICD-10-CM

## 2022-04-25 DIAGNOSIS — R10.13 EPIGASTRIC PAIN: ICD-10-CM

## 2022-04-25 PROBLEM — K31.84 GASTROPARESIS: Status: ACTIVE | Noted: 2022-04-25

## 2022-04-25 LAB
EXT PREGNANCY TEST URINE: NEGATIVE
EXT. CONTROL: NORMAL
GLUCOSE SERPL-MCNC: 142 MG/DL (ref 65–140)

## 2022-04-25 PROCEDURE — 43239 EGD BIOPSY SINGLE/MULTIPLE: CPT | Performed by: INTERNAL MEDICINE

## 2022-04-25 PROCEDURE — 82948 REAGENT STRIP/BLOOD GLUCOSE: CPT

## 2022-04-25 PROCEDURE — 88305 TISSUE EXAM BY PATHOLOGIST: CPT | Performed by: PATHOLOGY

## 2022-04-25 PROCEDURE — 81025 URINE PREGNANCY TEST: CPT | Performed by: STUDENT IN AN ORGANIZED HEALTH CARE EDUCATION/TRAINING PROGRAM

## 2022-04-25 RX ORDER — OXYCODONE HYDROCHLORIDE 5 MG/1
5 TABLET ORAL EVERY 6 HOURS PRN
Qty: 20 TABLET | Refills: 0 | Status: SHIPPED | OUTPATIENT
Start: 2022-04-25 | End: 2022-04-30

## 2022-04-25 RX ORDER — TRISODIUM CITRATE DIHYDRATE AND CITRIC ACID MONOHYDRATE 500; 334 MG/5ML; MG/5ML
30 SOLUTION ORAL ONCE
Status: COMPLETED | OUTPATIENT
Start: 2022-04-25 | End: 2022-04-25

## 2022-04-25 RX ORDER — METOCLOPRAMIDE 10 MG/1
10 TABLET ORAL
Qty: 90 TABLET | Refills: 1 | Status: SHIPPED | OUTPATIENT
Start: 2022-04-25

## 2022-04-25 RX ORDER — FENTANYL CITRATE 50 UG/ML
INJECTION, SOLUTION INTRAMUSCULAR; INTRAVENOUS AS NEEDED
Status: DISCONTINUED | OUTPATIENT
Start: 2022-04-25 | End: 2022-04-25

## 2022-04-25 RX ORDER — SODIUM CHLORIDE, SODIUM LACTATE, POTASSIUM CHLORIDE, CALCIUM CHLORIDE 600; 310; 30; 20 MG/100ML; MG/100ML; MG/100ML; MG/100ML
INJECTION, SOLUTION INTRAVENOUS CONTINUOUS PRN
Status: DISCONTINUED | OUTPATIENT
Start: 2022-04-25 | End: 2022-04-25

## 2022-04-25 RX ORDER — PROPOFOL 10 MG/ML
INJECTION, EMULSION INTRAVENOUS AS NEEDED
Status: DISCONTINUED | OUTPATIENT
Start: 2022-04-25 | End: 2022-04-25

## 2022-04-25 RX ORDER — METOCLOPRAMIDE HYDROCHLORIDE 5 MG/ML
10 INJECTION INTRAMUSCULAR; INTRAVENOUS ONCE
Status: COMPLETED | OUTPATIENT
Start: 2022-04-25 | End: 2022-04-25

## 2022-04-25 RX ORDER — EPHEDRINE SULFATE 50 MG/ML
INJECTION INTRAVENOUS AS NEEDED
Status: DISCONTINUED | OUTPATIENT
Start: 2022-04-25 | End: 2022-04-25

## 2022-04-25 RX ORDER — LIDOCAINE HYDROCHLORIDE 10 MG/ML
INJECTION, SOLUTION EPIDURAL; INFILTRATION; INTRACAUDAL; PERINEURAL AS NEEDED
Status: DISCONTINUED | OUTPATIENT
Start: 2022-04-25 | End: 2022-04-25

## 2022-04-25 RX ORDER — OXYCODONE HYDROCHLORIDE AND ACETAMINOPHEN 5; 325 MG/1; MG/1
1 TABLET ORAL ONCE
Status: COMPLETED | OUTPATIENT
Start: 2022-04-25 | End: 2022-04-25

## 2022-04-25 RX ORDER — FAMOTIDINE 40 MG/1
40 TABLET, FILM COATED ORAL
Qty: 30 TABLET | Refills: 0 | Status: SHIPPED | OUTPATIENT
Start: 2022-04-25 | End: 2022-05-17

## 2022-04-25 RX ADMIN — PROPOFOL 30 MG: 10 INJECTION, EMULSION INTRAVENOUS at 08:50

## 2022-04-25 RX ADMIN — LIDOCAINE HYDROCHLORIDE 50 MG: 10 INJECTION, SOLUTION EPIDURAL; INFILTRATION; INTRACAUDAL; PERINEURAL at 08:46

## 2022-04-25 RX ADMIN — METOCLOPRAMIDE HYDROCHLORIDE 10 MG: 5 INJECTION INTRAMUSCULAR; INTRAVENOUS at 07:55

## 2022-04-25 RX ADMIN — OXYCODONE AND ACETAMINOPHEN 1 TABLET: 5; 325 TABLET ORAL at 09:39

## 2022-04-25 RX ADMIN — SODIUM CHLORIDE, SODIUM LACTATE, POTASSIUM CHLORIDE, AND CALCIUM CHLORIDE: .6; .31; .03; .02 INJECTION, SOLUTION INTRAVENOUS at 08:41

## 2022-04-25 RX ADMIN — SODIUM CITRATE AND CITRIC ACID MONOHYDRATE 30 ML: 500; 334 SOLUTION ORAL at 08:03

## 2022-04-25 RX ADMIN — EPHEDRINE SULFATE 10 MG: 50 INJECTION, SOLUTION INTRAVENOUS at 08:55

## 2022-04-25 RX ADMIN — PROPOFOL 90 MG: 10 INJECTION, EMULSION INTRAVENOUS at 08:46

## 2022-04-25 RX ADMIN — PROPOFOL 20 MG: 10 INJECTION, EMULSION INTRAVENOUS at 08:52

## 2022-04-25 RX ADMIN — EPHEDRINE SULFATE 10 MG: 50 INJECTION, SOLUTION INTRAVENOUS at 09:02

## 2022-04-25 RX ADMIN — FENTANYL CITRATE 25 MCG: 50 INJECTION, SOLUTION INTRAMUSCULAR; INTRAVENOUS at 08:46

## 2022-04-25 RX ADMIN — PROPOFOL 20 MG: 10 INJECTION, EMULSION INTRAVENOUS at 08:48

## 2022-04-25 NOTE — ANESTHESIA POSTPROCEDURE EVALUATION
Post-Op Assessment Note    CV Status:  Stable    Pain management: adequate     Mental Status:  Alert   Hydration Status:  Stable   PONV Controlled:  None   Airway Patency:  Patent      Post Op Vitals Reviewed: Yes      Staff: Anesthesiologist, CRNA   Comments: Patient slightly hypotensive in post-procedure  Ephedrine given by CRNA at bedside with improvement  CRNA at bedside until patient awake and responsive, /71  No complications documented      /71 (04/25/22 0900)    Temp     Pulse 77 (04/25/22 0900)   Resp 16 (04/25/22 0900)    SpO2 95 % (04/25/22 0900)

## 2022-04-25 NOTE — NURSING NOTE
Pt discharged at this time following thorough review of d/c paperwork, and all VSS  Pt alert, oriented, agreeable and verbalized comfort and readiness with discharge  pt ambulated out of unit wihtout issue with her aunt, Justin Schmid

## 2022-04-25 NOTE — H&P
History and Physical - [facility]  David Fortune 42 y o  female MRN: [id number]                  HPI: Bentley Canales is a 42y o  year old female who presents for severe reflux, history of Candida esophagitis, nausea and vomiting      REVIEW OF SYSTEMS: Per the HPI, and otherwise unremarkable  Historical Information   Past Medical History:   Diagnosis Date    ABPA [facility]opulmonary [facility]illosis) [facility] 7/8/2012    Acute kidney injury ([facility] ) 9/8/2017    Acute on chronic respiratory failure ([facility] ) 3/4/2017    Last Assessment & Plan:  Formatting of this note might be different from the original  Recurrent with clinical worsening with enlarging PTX, s/p multiple chest tubes, but new fevers 6/19 and worsening leukocytosis indicating infection as cause of 6/19 decompensation with profound hypoxia in setting of shunting through right lung and continued pneumothorax s/p 2nd chest tube placement  6/27: Right    Asthma     Chronic pain     Cystic fibrosis ([facility] )     Diabetes mellitus [facility]     GERD (gastroesophageal reflux disease)     HBP (high blood pressure)     HCAP (healthcare-associated pneumonia) 3/6/2020    Influenza B 2/23/2020    Kidney stone     Neutropenic fever ([facility] ) 9/26/2020    PONV (postoperative nausea and vomiting)     Pseudomonas aeruginosa infection 9/20/2012    Transplant recipient     Lung- 2013, 2017     Past Surgical History:   Procedure Laterality Date    APPENDECTOMY      AUGMENTATION MAMMAPLASTY Bilateral 2004    AUGMENTATION MAMMAPLASTY Right 2021    removed     BREAST SURGERY      Aug      BRONCHOSCOPY      CHOLECYSTECTOMY      COLONOSCOPY      EGD  07/2019    Yeast Infect       FL RETROGRADE PYELOGRAM  1/9/2020    FL RETROGRADE PYELOGRAM  2/4/2020    GASTROSTOMY TUBE, PLACE      IR PORT PLACEMENT  3/22/2022    IR PORT REMOVAL  1/10/2022    IR TUNNELED CENTRAL LINE PLACEMENT  1/13/2022    LUNG SURGERY      LUNG TRANSPLANT X2    VA CYSTO/URETERO W/LITHOTRIPSY &INDWELL STENT INSRT Right 2/4/2020    Procedure: CYSTOSCOPY URETEROSCOPY WITH LITHOTRIPSY HOLMIUM LASER, RETROGRADE PYELOGRAM AND INSERTION STENT URETERAL;  Surgeon: Monica Lepe MD;  [facility];  Service: Urology    VA CYSTOURETHROSCOPY,URETER CATHETER Right 1/9/2020    Procedure: CYSTOSCOPY RETROGRADE PYELOGRAM WITH INSERTION STENT URETERAL;  Surgeon: Monica Lepe MD;  [facility];  Service: Urology    US GUIDED VASCULAR ACCESS  1/17/2017     Social History   Social History     Substance and Sexual Activity   Alcohol Use Yes    Comment: social     Social History     Substance and Sexual Activity   Drug Use Yes    Frequency: 2 0 times per week    Types: Marijuana    Comment: medical edible marijuana prescribed     Social History     Tobacco Use   Smoking Status Former Smoker    Packs/day: 0 50    Years: 12 00    Pack years: 6 00   Smokeless Tobacco Never Used     Family History   Problem Relation Age of Onset    Alcohol abuse Mother     Mental illness Mother     Alcohol abuse Father     COPD Maternal Grandmother     Lung cancer Maternal Grandmother 72    COPD Maternal Grandfather     COPD Paternal Grandmother     Lung cancer Paternal Grandmother 72    No Known Problems Sister     No Known Problems Paternal Aunt     Breast cancer Neg Hx     Ovarian cancer Neg Hx     Colon cancer Neg Hx        Meds/Allergies     (Not in a hospital admission)      Allergies   Allergen Reactions    Vancomycin Angioedema and Hives    Gabapentin Hallucinations    Ceftazidime Headache     Severe headaches after desensitization    Nsaids Other (See Comments)     Lung transplant increases risk of renal toxicity, call lung txp provider to discuss if needed       Objective     /85   Pulse 75   Temp (!) 96 9 °F (36 1 °C) (Temporal)   Resp 16   Wt 46 7 kg (103 lb)   SpO2 98%   BMI 18 84 kg/m²       PHYSICAL EXAM    Gen: NAD  CV: RRR  CHEST: Clear  ABD: soft, NT/ND  EXT: no edema      ASSESSMENT/PLAN:  This is a 42y o  year old female here for EGD , and she is stable and optimized for her procedure

## 2022-04-25 NOTE — ANESTHESIA PREPROCEDURE EVALUATION
Procedure:  EGD    SEVERE reflux preprocedure and full stomach  (post induction update)    Echo 2022  No EF reported, "systolic function normal",   Pulm HTN - PASP 46    Relevant Problems   CARDIO   (+) Chest pain   (+) DVT (deep venous thrombosis) (HCC)   (+) Essential hypertension   (+) Hypercholesteremia      GI/HEPATIC   (+) Esophageal reflux   (+) Hiatal hernia   (+) Pancreatic insufficiency      /RENAL   (+) Chronic kidney disease      HEMATOLOGY   (+) Acute on chronic anemia   (+) Thrombocytopenia (HCC)      NEURO/PSYCH   (+) Anxiety   (+) Chronic pain disorder      PULMONARY   (+) Asthma   (+) Chronic respiratory failure with hypoxia (HCC)        Physical Exam    Airway    Mallampati score: II  TM Distance: >3 FB  Neck ROM: full     Dental   No notable dental hx     Cardiovascular  Rhythm: regular, Rate: normal, Cardiovascular exam normal    Pulmonary  Pulmonary exam normal Breath sounds clear to auscultation,     Other Findings        Anesthesia Plan  ASA Score- 2     Anesthesia Type- IV sedation with anesthesia with ASA Monitors  Additional Monitors:   Airway Plan:     Comment: Discussed risks/benefits, including medication reactions, awareness, aspiration, and serious/life threatening complications  Plan to maintain native airway with IVGA, monitored with EtCO2  Plan Factors-Exercise tolerance (METS): >4 METS  Patient summary reviewed  Patient instructed to abstain from smoking on day of procedure  Patient did not smoke on day of surgery  Induction- intravenous  Postoperative Plan-     Informed Consent- Anesthetic plan and risks discussed with patient  I personally reviewed this patient with the CRNA  Discussed and agreed on the Anesthesia Plan with the CRNA  Trey Sanchez

## 2022-04-28 ENCOUNTER — LAB REQUISITION (OUTPATIENT)
Dept: LAB | Facility: HOSPITAL | Age: 47
End: 2022-04-28
Payer: COMMERCIAL

## 2022-04-28 DIAGNOSIS — J96.01 ACUTE RESPIRATORY FAILURE WITH HYPOXIA (HCC): ICD-10-CM

## 2022-04-28 DIAGNOSIS — Z94.2 LUNG TRANSPLANT STATUS (HCC): ICD-10-CM

## 2022-04-28 LAB
ALBUMIN SERPL BCP-MCNC: 3.6 G/DL (ref 3.5–5)
ALP SERPL-CCNC: 99 U/L (ref 46–116)
ALT SERPL W P-5'-P-CCNC: 24 U/L (ref 12–78)
ANION GAP SERPL CALCULATED.3IONS-SCNC: 12 MMOL/L (ref 4–13)
AST SERPL W P-5'-P-CCNC: 41 U/L (ref 5–45)
BASOPHILS # BLD AUTO: 0.02 THOUSANDS/ΜL (ref 0–0.1)
BASOPHILS NFR BLD AUTO: 0 % (ref 0–1)
BILIRUB SERPL-MCNC: 0.42 MG/DL (ref 0.2–1)
BUN SERPL-MCNC: 24 MG/DL (ref 5–25)
CALCIUM SERPL-MCNC: 8.4 MG/DL (ref 8.3–10.1)
CHLORIDE SERPL-SCNC: 104 MMOL/L (ref 100–108)
CO2 SERPL-SCNC: 20 MMOL/L (ref 21–32)
CREAT SERPL-MCNC: 0.9 MG/DL (ref 0.6–1.3)
EOSINOPHIL # BLD AUTO: 0.25 THOUSAND/ΜL (ref 0–0.61)
EOSINOPHIL NFR BLD AUTO: 3 % (ref 0–6)
ERYTHROCYTE [DISTWIDTH] IN BLOOD BY AUTOMATED COUNT: 21.9 % (ref 11.6–15.1)
GFR SERPL CREATININE-BSD FRML MDRD: 78 ML/MIN/1.73SQ M
GLUCOSE SERPL-MCNC: 250 MG/DL (ref 65–140)
HCT VFR BLD AUTO: 37 % (ref 34.8–46.1)
HGB BLD-MCNC: 11.5 G/DL (ref 11.5–15.4)
IMM GRANULOCYTES # BLD AUTO: 0.03 THOUSAND/UL (ref 0–0.2)
IMM GRANULOCYTES NFR BLD AUTO: 0 % (ref 0–2)
LYMPHOCYTES # BLD AUTO: 1.9 THOUSANDS/ΜL (ref 0.6–4.47)
LYMPHOCYTES NFR BLD AUTO: 24 % (ref 14–44)
MAGNESIUM SERPL-MCNC: 1.6 MG/DL (ref 1.6–2.6)
MCH RBC QN AUTO: 26.7 PG (ref 26.8–34.3)
MCHC RBC AUTO-ENTMCNC: 31.1 G/DL (ref 31.4–37.4)
MCV RBC AUTO: 86 FL (ref 82–98)
MONOCYTES # BLD AUTO: 0.45 THOUSAND/ΜL (ref 0.17–1.22)
MONOCYTES NFR BLD AUTO: 6 % (ref 4–12)
NEUTROPHILS # BLD AUTO: 5.24 THOUSANDS/ΜL (ref 1.85–7.62)
NEUTS SEG NFR BLD AUTO: 67 % (ref 43–75)
NRBC BLD AUTO-RTO: 0 /100 WBCS
PLATELET # BLD AUTO: 187 THOUSANDS/UL (ref 149–390)
PMV BLD AUTO: 10.2 FL (ref 8.9–12.7)
POTASSIUM SERPL-SCNC: 5.2 MMOL/L (ref 3.5–5.3)
PROT SERPL-MCNC: 7.3 G/DL (ref 6.4–8.2)
RBC # BLD AUTO: 4.31 MILLION/UL (ref 3.81–5.12)
SODIUM SERPL-SCNC: 136 MMOL/L (ref 136–145)
TACROLIMUS BLD-MCNC: 12.4 NG/ML (ref 2–20)
WBC # BLD AUTO: 7.89 THOUSAND/UL (ref 4.31–10.16)

## 2022-04-28 PROCEDURE — 80197 ASSAY OF TACROLIMUS: CPT | Performed by: INTERNAL MEDICINE

## 2022-04-28 PROCEDURE — 85025 COMPLETE CBC W/AUTO DIFF WBC: CPT | Performed by: INTERNAL MEDICINE

## 2022-04-28 PROCEDURE — 80053 COMPREHEN METABOLIC PANEL: CPT | Performed by: INTERNAL MEDICINE

## 2022-04-28 PROCEDURE — 83735 ASSAY OF MAGNESIUM: CPT | Performed by: INTERNAL MEDICINE

## 2022-05-06 ENCOUNTER — TELEPHONE (OUTPATIENT)
Dept: GASTROENTEROLOGY | Facility: CLINIC | Age: 47
End: 2022-05-06

## 2022-05-06 ENCOUNTER — OFFICE VISIT (OUTPATIENT)
Dept: GASTROENTEROLOGY | Facility: CLINIC | Age: 47
End: 2022-05-06
Payer: COMMERCIAL

## 2022-05-06 VITALS
WEIGHT: 104 LBS | SYSTOLIC BLOOD PRESSURE: 122 MMHG | BODY MASS INDEX: 19.14 KG/M2 | HEART RATE: 69 BPM | DIASTOLIC BLOOD PRESSURE: 79 MMHG | HEIGHT: 62 IN

## 2022-05-06 DIAGNOSIS — R11.14 BILIOUS VOMITING WITH NAUSEA: ICD-10-CM

## 2022-05-06 DIAGNOSIS — K21.00 GASTROESOPHAGEAL REFLUX DISEASE WITH ESOPHAGITIS WITHOUT HEMORRHAGE: Primary | ICD-10-CM

## 2022-05-06 PROCEDURE — 99214 OFFICE O/P EST MOD 30 MIN: CPT | Performed by: INTERNAL MEDICINE

## 2022-05-06 PROCEDURE — 3008F BODY MASS INDEX DOCD: CPT | Performed by: INTERNAL MEDICINE

## 2022-05-06 PROCEDURE — 1036F TOBACCO NON-USER: CPT | Performed by: INTERNAL MEDICINE

## 2022-05-06 RX ORDER — SUCRALFATE ORAL 1 G/10ML
1 SUSPENSION ORAL
Qty: 420 ML | Refills: 1 | Status: SHIPPED | OUTPATIENT
Start: 2022-05-06 | End: 2022-08-01 | Stop reason: SDUPTHER

## 2022-05-06 NOTE — PROGRESS NOTES
[facility] - Outpatient Follow-up Note  Fide Fortune 42 y o  female MRN: [id number]  Encounter: [id number]          ASSESSMENT AND PLAN:      1  Gastroesophageal reflux disease with esophagitis without hemorrhage  [de-identified] year female with long history of GERD, she had a recent upper endoscopy which shows large amount of solid food in the stomach, she is on chronic prednisone after lung transplant, history of cystic fibrosis, she developed diabetes mellitus and her blood sugar is running high  I started on Reglan 10 mg 3 times a day which she is tolerating  Nausea and vomiting resolved but reflux symptoms still persist, her insurance did not cover Kamala Mcknight, she is being out of pocket for Dexilant, she is enquiring about minimal invasive endoscopic anti-reflux surgery, TIF  Risk and benefit of the procedure was discussed, will continue with Dexilant, add Carafate 1 g 3 times a day and continue with famotidine, will order gastric emptying study and if it come back normal then will proceed with TIF  - sucralfate (CARAFATE) 1 g/10 mL suspension; Take 10 mL (1 g total) by mouth 4 (four) times a day (with meals and at bedtime)  Dispense: 420 mL; Refill: 1  - NM gastric emptying; Future    2  Bilious vomiting with nausea  Possible related to diabetic gastroparesis, she is on chronic prednisone which lead to high blood sugar, Reglan was started 3 times a day which is helping her, will order gastric emptying study  - NM gastric emptying; Future    ______________________________________________________________________    SUBJECTIVE:  Patient seen and examined, she come for follow-up after upper endoscopy she was complaining worsening of reflux symptoms, chronic nausea and vomiting    Endoscopy shows mild Candida esophagitis in upper esophagus, severe reflux esophagitis, large amount of solid food in the stomach, stomach visualization was suboptimal, gastric biopsy came back negative for H pylori infection, lower esophageal biopsy confirmed evidence of reflux disease and upper esophageal biopsy confirmed evidence of Candida esophagitis  She finish liquid nystatin, she is currently taking Dexilant and famotidine but reflux symptoms still persist, I started on Reglan which is helping her  REVIEW OF SYSTEMS IS OTHERWISE NEGATIVE  Historical Information   Past Medical History:   Diagnosis Date    ABPA (allergic bronchopulmonary aspergillosis) ([facility] ) 7/8/2012    [facility] 9/8/2017    Acute on chronic respiratory failure ([facility] ) 3/4/2017    Last Assessment & Plan:  Formatting of this note might be different from the original  Recurrent with clinical worsening with enlarging PTX, s/p multiple chest tubes, but new fevers 6/19 and worsening leukocytosis indicating infection as cause of 6/19 decompensation with profound hypoxia in setting of shunting through right lung and continued pneumothorax s/p 2nd chest tube placement  6/27: Right    Asthma     Chronic pain     Cystic fibrosis ([facility] )     [facility]     GERD (gastroesophageal reflux disease)     HBP (high blood pressure)     HCAP (healthcare-associated pneumonia) 3/6/2020    Influenza B 2/23/2020    Kidney stone     Neutropenic fever ([facility] ) 9/26/2020    PONV (postoperative nausea and vomiting)     Pseudomonas aeruginosa infection 9/20/2012    Transplant recipient     Lung- 2013, 2017     Past Surgical History:   Procedure Laterality Date    APPENDECTOMY      AUGMENTATION MAMMAPLASTY Bilateral 2004    AUGMENTATION MAMMAPLASTY Right 2021    removed     BREAST SURGERY      Aug      BRONCHOSCOPY      CHOLECYSTECTOMY      COLONOSCOPY      EGD  07/2019    Yeast Infect       FL RETROGRADE PYELOGRAM  1/9/2020    FL RETROGRADE PYELOGRAM  2/4/2020    GASTROSTOMY TUBE, PLACE      IR PORT PLACEMENT  3/22/2022    IR PORT REMOVAL  1/10/2022    IR TUNNELED CENTRAL LINE PLACEMENT  1/13/2022    LUNG SURGERY      LUNG TRANSPLANT      X2    WI CYSTO/URETERO W/LITHOTRIPSY &INDWELL STENT INSRT Right 2/4/2020    Procedure: CYSTOSCOPY URETEROSCOPY WITH LITHOTRIPSY HOLMIUM LASER, RETROGRADE PYELOGRAM AND INSERTION STENT URETERAL;  Surgeon: Haydee Adams MD;  [facility];  Service: Urology    WI CYSTOURETHROSCOPY,URETER CATHETER Right 1/9/2020    Procedure: CYSTOSCOPY RETROGRADE PYELOGRAM WITH INSERTION STENT URETERAL;  Surgeon: Haydee Adams MD;  [facility];  Service: Urology   [address] [place] [address]  1/17/2017     Social History   Social History     Substance and Sexual Activity   Alcohol Use Yes    Comment: social     Social History     Substance and Sexual Activity   Drug Use Yes    Frequency: 2 0 times per week    Types: Marijuana    Comment: medical edible marijuana prescribed     Social History     Tobacco Use   Smoking Status Former Smoker    Packs/day: 0 50    Years: 12 00    Pack years: 6 00   Smokeless Tobacco Never Used     Family History   Problem Relation Age of Onset    Alcohol abuse Mother     Mental illness Mother     Alcohol abuse Father     COPD Maternal Grandmother     Lung cancer Maternal Grandmother 72    COPD Maternal Grandfather     COPD Paternal Grandmother     Lung cancer Paternal Grandmother 72    No Known Problems Sister     No Known Problems Paternal Aunt     Breast cancer Neg Hx     Ovarian cancer Neg Hx     Colon cancer Neg Hx        Meds/Allergies       Current Outpatient Medications:     acetaminophen (TYLENOL) 325 mg tablet    albuterol (2 5 mg/3 mL) 0 083 % nebulizer solution    ALPRAZolam (XANAX) 1 mg tablet    apixaban (ELIQUIS) 5 mg    atovaquone (MEPRON) 750 mg/5 mL suspension    dexlansoprazole (Dexilant) 60 MG capsule    dronabinol (MARINOL) 5 MG capsule    famotidine (PEPCID) 40 MG tablet    gabapentin (NEURONTIN) 100 mg capsule    insulin lispro (HumaLOG) 100 units/mL injection    metoclopramide (Reglan) 10 mg tablet    nystatin (MYCOSTATIN) 500,000 units/5 mL suspension    pancrelipase, Lip-Prot-Amyl, (CREON) 24,000 units    predniSONE 10 mg tablet    Respiratory Therapy Supplies (Nebulizer) SUKHI    tacrolimus (PROGRAF) 1 mg capsule    topiramate (Topamax) 50 MG tablet    traZODone (DESYREL) 50 mg tablet    valACYclovir (VALTREX) 500 mg tablet    sucralfate (CARAFATE) 1 g/10 mL suspension    Allergies   Allergen Reactions    Vancomycin Angioedema and Hives    Gabapentin Hallucinations    Ceftazidime Headache     Severe headaches after desensitization    Nsaids Other (See Comments)     Lung transplant increases risk of renal toxicity, call lung txp provider to discuss if needed           Objective     Blood pressure 122/79, pulse 69, height 5' 2" (1 575 m), weight 47 2 kg (104 lb), not currently breastfeeding  Body mass index is 19 02 kg/m²  PHYSICAL EXAM:      General Appearance:   Alert, cooperative, no distress   HEENT:   Normocephalic, atraumatic, anicteric      Neck:  Supple, symmetrical, trachea midline   Lungs:   Clear to auscultation bilaterally; no rales, rhonchi or wheezing; respirations unlabored    Heart[de-identified]   Regular rate and rhythm; no murmur, rub, or gallop  Abdomen:   Soft, non-tender, non-distended; normal bowel sounds; no masses, no organomegaly , prior surgical scar well healed   Genitalia:   Deferred    Rectal:   Deferred    Extremities:  No cyanosis, clubbing or edema    Pulses:  2+ and symmetric    Skin:  No jaundice, rashes, or lesions    Lymph nodes:  No palpable cervical lymphadenopathy        Lab Results:   No visits with results within 1 Day(s) from this visit     Latest known visit with results is:   Lab Requisition on 04/28/2022   Component Date Value    Sodium 04/28/2022 136     Potassium 04/28/2022 5 2     Chloride 04/28/2022 104     CO2 04/28/2022 20*    ANION GAP 04/28/2022 12     BUN 04/28/2022 24     Creatinine 04/28/2022 0 90     Glucose 04/28/2022 250*  Calcium 04/28/2022 8 4     AST 04/28/2022 41     ALT 04/28/2022 24     Alkaline Phosphatase 04/28/2022 99     Total Protein 04/28/2022 7 3     Albumin 04/28/2022 3 6     Total Bilirubin 04/28/2022 0 42     eGFR 04/28/2022 78     Magnesium 04/28/2022 1 6     TACROLIMUS 04/28/2022 12 4     WBC 04/28/2022 7 89     RBC 04/28/2022 4 31     Hemoglobin 04/28/2022 11 5     Hematocrit 04/28/2022 37 0     MCV 04/28/2022 86     MCH 04/28/2022 26 7*    MCHC 04/28/2022 31 1*    RDW 04/28/2022 21 9*    MPV 04/28/2022 10 2     Platelets 52/77/7228 187     nRBC 04/28/2022 0     Neutrophils Relative 04/28/2022 67     Immat GRANS % 04/28/2022 0     Lymphocytes Relative 04/28/2022 24     Monocytes Relative 04/28/2022 6     Eosinophils Relative 04/28/2022 3     Basophils Relative 04/28/2022 0     Neutrophils Absolute 04/28/2022 5 24     Immature Grans Absolute 04/28/2022 0 03     Lymphocytes Absolute 04/28/2022 1 90     Monocytes Absolute 04/28/2022 0 45     Eosinophils Absolute 04/28/2022 0 25     Basophils Absolute 04/28/2022 0 02          Radiology Results:   EGD    Result Date: 4/25/2022  Narrative: PI[facility] [address]leg[address] [facility] [id number] [phone] DATE OF SERVICE: 4/25/22 PHYSICIAN(S): Attending: Sherren Liverpool, MD Fellow: No Staff Documented Procedure :  EGD with biopsies INDICATION: Gastroesophageal reflux disease with esophagitis without hemorrhage POST-OP DIAGNOSIS: See the impression below  PREPROCEDURE: Informed consent was obtained for the procedure, including sedation  Risks of perforation, hemorrhage, adverse drug reaction and aspiration were discussed  The patient was placed in the left lateral decubitus position  Patient was explained about the risks and benefits of the procedure  Risks including but not limited to bleeding, infection, and perforation were explained in detail   Also explained about less than 100% sensitivity with the exam and other alternatives  DETAILS OF PROCEDURE: Patient was taken to the procedure room where a time out was performed to confirm correct patient and correct procedure  The patient underwent monitored anesthesia care, which was administered by an anesthesia professional  The patient's blood pressure, heart rate, level of consciousness, respirations and oxygen were monitored throughout the procedure  The scope was advanced to the second part of the duodenum  Retroflexion was performed in the fundus  Prior to the procedure, the patient's H  Pylori status was unknown  The patient experienced no blood loss  The procedure was not difficult  The patient tolerated the procedure well  There were no apparent complications  ANESTHESIA INFORMATION: ASA: II Anesthesia Type: IV Sedation with Anesthesia MEDICATIONS: No administrations occurring from 0842 to 0857 on 04/25/22 FINDINGS: Large amount of liquid and semi-solid food in the stomach suggests possible evidence of gastroparesis Moderate, localized grade A esophagitis with mucosal breaks measuring less than 5 mm not continuous between folds, covering less than 75% of the circumference, showing edematous, erythematous and granular mucosa in the lower third of the esophagus; performed cold forceps biopsy Few whitish plaque in the upper esophagus, biopsies were done to rule out Candida esophagitis The duodenal bulb, 1st part of the duodenum and 2nd part of the duodenum appeared normal  Mild, localized erythematous mucosa in the antrum; performed cold forceps biopsy to rule out H  pylori SPECIMENS: ID Type Source Tests Collected by Time Destination 1 : antrum Tissue Stomach TISSUE EXAM Sharon Gomez MD 4/25/2022  8:52 AM  2 : lower  Tissue Esophagus TISSUE EXAM Sharon Gomez MD 4/25/2022  8:54 AM  3 : upper Tissue Esophagus TISSUE EXAM Sharon Gomez MD 4/25/2022  8:54 AM      Impression: 1   Large amount of food material in the stomach suggests evidence of possible gastroparesis 2  Mild to moderate degree of reflux esophagitis 3  Few whitish plaque in the upper esophagus possible Candida esophagitis 4  Normal duodenum RECOMMENDATION: Await pathology results Follow up with me in clinic 6 weeks Pantoprazole 40 milligram p o  B i d  Start Reglan 10 milligram 3 times a day before each meal Liquid nystatin 5 mL 3 times a day for 7-10 days   Eli Karimi MD     US MSK limited    Result Date: 4/18/2022  Narrative: US MSK LIMITED INDICATION:   M79 2: Neuralgia and neuritis, unspecified M79 671: Pain in right foot  COMPARISON:  None  TECHNIQUE: Using a high frequency transducer, the right common peroneal nerve was scanned using grayscale ultrasound and color Doppler imaging  FINDINGS: The right common peroneal nerve has normal sonographic appearance with normal branching into the superficial and deep fibular nerves  No evidence of nerve enlargement, hyperemia, or entrapment  No focal mass seen  Impression: Normal sonographic appearance of right common peroneal nerve   Workstation performed: [id number]

## 2022-05-09 ENCOUNTER — TELEPHONE (OUTPATIENT)
Dept: RADIOLOGY | Facility: CLINIC | Age: 47
End: 2022-05-09

## 2022-05-09 DIAGNOSIS — M54.16 LUMBAR RADICULOPATHY: ICD-10-CM

## 2022-05-09 DIAGNOSIS — G89.4 CHRONIC PAIN SYNDROME: ICD-10-CM

## 2022-05-09 DIAGNOSIS — M79.671 RIGHT FOOT PAIN: ICD-10-CM

## 2022-05-09 NOTE — TELEPHONE ENCOUNTER
----- Message from Lopez Mckeon sent at 5/7/2022 12:53 PM EDT -----  Regarding: PT and RX  Hi,     Sorry for the delay, I was hoping the pain would get better but it hasnt     I am slightly depressed but Im pretty sure it is because the pain is getting worse again and not a medication side effect so I would like to try the increase in medication please     I also have not heard from home physical therapy at all, can someone please advise?      Many thanks,   ETHEL Jenkins

## 2022-05-09 NOTE — TELEPHONE ENCOUNTER
Hi,      Sorry for the delay, I was hoping the pain would get better but it hasnt      I am slightly depressed but Im pretty sure it is because the pain is getting worse again and not a medication side effect so I would like to try the increase in medication please      I also have not heard from home physical therapy at all, can someone please advise? Many thanks,   [facility]     April 22, 2022    Me  to Chanda Crawford          8:19 AM  Dr Marcelino Alvarez place a referral for home physical therapy  Please let me know if you need me to mail that script to you or fax it somewhere  Also, he would be willing to increase the dose of topiramate to help you get pain relief  Have you noticed any side effects with your current dosage? Last read by Caleb Traylor at 12:50 PM on 5/7/2022 April 21, 2022    Michael Yang MD  to [facility]          4:23 PM  Home Health order in [address] [place] [address] Rd  I can try a higher dose of topiramate if she is interested  April 20, 2022    Me  to Michael Yang MD          3:45 PM  Please place referral for home PT     Give topamax more time? Last ov 4/12       Chanda Crawford D  to Paulette Godoy MD          3:40 PM  [facility] Dr Marcelino Alvarez,      I contacted physical therapy and they told me that I would not be able to go in person because I already have in-home services therefore my physical therapy would need to be at my home as well  The in-home services that I have are monthly blood work at my home that is accessed via my portacath, my transplant doctor is in process of reordering my monthly bloodwork as I just got a new portacath recently due to sepsis  The physical therapist that I spoke with told me to contact you and ask you to resubmit the prescription for at home physical therapy        I also wanted to let you know that since I started the new prescription that you gave me the pain has gotten much worse again, Im not sure if there is an adjustment period but I am unable to do basic things again such as driving and even walking has become difficult again, please advise        Thank you,   ETHEL Jenkins

## 2022-05-13 RX ORDER — TOPIRAMATE 100 MG/1
100 TABLET, FILM COATED ORAL 2 TIMES DAILY
Qty: 60 TABLET | Refills: 0 | Status: SHIPPED | OUTPATIENT
Start: 2022-05-13 | End: 2022-05-17 | Stop reason: ALTCHOICE

## 2022-05-16 ENCOUNTER — PATIENT MESSAGE (OUTPATIENT)
Dept: NEUROLOGY | Facility: CLINIC | Age: 47
End: 2022-05-16

## 2022-05-16 DIAGNOSIS — R29.898 WEAKNESS OF RIGHT LOWER EXTREMITY: Primary | ICD-10-CM

## 2022-05-16 DIAGNOSIS — M79.2 NEUROPATHIC PAIN OF RIGHT FOOT: ICD-10-CM

## 2022-05-16 DIAGNOSIS — M79.671 RIGHT FOOT PAIN: ICD-10-CM

## 2022-05-17 ENCOUNTER — TELEPHONE (OUTPATIENT)
Dept: RADIOLOGY | Facility: CLINIC | Age: 47
End: 2022-05-17

## 2022-05-17 DIAGNOSIS — G89.4 CHRONIC PAIN SYNDROME: Primary | ICD-10-CM

## 2022-05-17 DIAGNOSIS — M79.671 RIGHT FOOT PAIN: ICD-10-CM

## 2022-05-17 DIAGNOSIS — M54.16 LUMBAR RADICULOPATHY: ICD-10-CM

## 2022-05-17 DIAGNOSIS — K21.00 GASTROESOPHAGEAL REFLUX DISEASE WITH ESOPHAGITIS WITHOUT HEMORRHAGE: ICD-10-CM

## 2022-05-17 RX ORDER — PREGABALIN 100 MG/1
100 CAPSULE ORAL 2 TIMES DAILY
Qty: 60 CAPSULE | Refills: 0 | Status: SHIPPED | OUTPATIENT
Start: 2022-05-17 | End: 2022-06-16 | Stop reason: SDUPTHER

## 2022-05-17 RX ORDER — FAMOTIDINE 40 MG/1
TABLET, FILM COATED ORAL
Qty: 30 TABLET | Refills: 0 | Status: SHIPPED | OUTPATIENT
Start: 2022-05-17 | End: 2022-06-08

## 2022-05-17 NOTE — TELEPHONE ENCOUNTER
Pt requesting to trial lyrica 100mg twice a day  Pt states she has tried lyrica and noticed relief  Pt did not increase topamax as suggested    Please advise on request to switch to lyrica

## 2022-05-23 DIAGNOSIS — K21.00 GASTROESOPHAGEAL REFLUX DISEASE WITH ESOPHAGITIS WITHOUT HEMORRHAGE: ICD-10-CM

## 2022-05-23 DIAGNOSIS — K21.00 GASTROESOPHAGEAL REFLUX DISEASE WITH ESOPHAGITIS WITHOUT HEMORRHAGE: Primary | ICD-10-CM

## 2022-05-23 RX ORDER — DEXLANSOPRAZOLE 60 MG/1
60 CAPSULE, DELAYED RELEASE ORAL DAILY
Qty: 30 CAPSULE | Refills: 0 | Status: SHIPPED | OUTPATIENT
Start: 2022-05-23 | End: 2022-05-24

## 2022-05-24 DIAGNOSIS — K21.00 GASTROESOPHAGEAL REFLUX DISEASE WITH ESOPHAGITIS WITHOUT HEMORRHAGE: ICD-10-CM

## 2022-05-24 RX ORDER — DEXLANSOPRAZOLE 60 MG/1
60 CAPSULE, DELAYED RELEASE ORAL DAILY
Qty: 30 CAPSULE | Refills: 0 | Status: SHIPPED | OUTPATIENT
Start: 2022-05-24

## 2022-05-24 RX ORDER — DEXLANSOPRAZOLE 60 MG/1
60 CAPSULE, DELAYED RELEASE ORAL DAILY
Qty: 30 CAPSULE | Refills: 0 | Status: SHIPPED | OUTPATIENT
Start: 2022-05-24 | End: 2022-05-24 | Stop reason: SDUPTHER

## 2022-05-31 DIAGNOSIS — E84.9 CYSTIC FIBROSIS (HCC): ICD-10-CM

## 2022-06-02 RX ORDER — ALPRAZOLAM 1 MG/1
1 TABLET ORAL
Qty: 30 TABLET | Refills: 1 | Status: SHIPPED | OUTPATIENT
Start: 2022-06-02

## 2022-06-08 DIAGNOSIS — K21.00 GASTROESOPHAGEAL REFLUX DISEASE WITH ESOPHAGITIS WITHOUT HEMORRHAGE: ICD-10-CM

## 2022-06-08 RX ORDER — FAMOTIDINE 40 MG/1
TABLET, FILM COATED ORAL
Qty: 90 TABLET | Refills: 1 | Status: SHIPPED | OUTPATIENT
Start: 2022-06-08

## 2022-06-16 ENCOUNTER — TELEPHONE (OUTPATIENT)
Dept: OBGYN CLINIC | Facility: CLINIC | Age: 47
End: 2022-06-16

## 2022-06-16 DIAGNOSIS — N93.9 ABNORMAL UTERINE BLEEDING (AUB): Primary | ICD-10-CM

## 2022-06-16 NOTE — TELEPHONE ENCOUNTER
----- Message from Lopez Mckeon sent at 6/16/2022  9:56 AM EDT -----  Regarding: Bleeding  Hello,     I think I need to schedule an appointment, I have basically been bleeding for close to 4 weeks and this is definitely not normal  I was on vacation in [place] roughly 3 1/2 weeks ago and my flow was heavier than it has ever been, I thought I might be having a miscarriage but I took a pregnancy test and it came up negative  After bleeding extremely heavily for about 4 days it slowed down to what is like a normal period but it has not stopped  Its definitely slowed down more since but I bleed at least a little bit each day  Im extremely patel and hormonal which is also not normal      I was going to get in touch sooner but I just recently got over having Covid, please advise at your earliest convenience      Many thanks,   ETHEL Jenkins

## 2022-06-16 NOTE — TELEPHONE ENCOUNTER
----- Message from Lopez Mckeon sent at 6/16/2022  1:10 PM EDT -----  Regarding: reply from Dr Juana Pressley,     I just looked them up and I am unable to find a fax number but I did find a phone number for their office that services [facility]  Can you please call them to find out the procedure for placing an order for a current patient (attached)? I called and scheduled the ultrasound and it is on June 23rd  Please let me know if you need any additional information and thank you for your prompt responses      Many thanks,   E  JAIME Jenkins

## 2022-06-20 ENCOUNTER — TELEPHONE (OUTPATIENT)
Dept: OBGYN CLINIC | Facility: CLINIC | Age: 47
End: 2022-06-20

## 2022-06-20 NOTE — TELEPHONE ENCOUNTER
Voice mail 12:30 pm:    Caller (not sure who it is) is asking for order to be faxed to [address] [place] [address] number left  Unable to get through to anyone on the phone this morning

## 2022-06-23 ENCOUNTER — HOSPITAL ENCOUNTER (OUTPATIENT)
Dept: ULTRASOUND IMAGING | Facility: HOSPITAL | Age: 47
Discharge: HOME | End: 2022-06-23
Attending: STUDENT IN AN ORGANIZED HEALTH CARE EDUCATION/TRAINING PROGRAM
Payer: COMMERCIAL

## 2022-06-23 ENCOUNTER — HOSPITAL ENCOUNTER (OUTPATIENT)
Dept: NUCLEAR MEDICINE | Facility: HOSPITAL | Age: 47
Discharge: HOME | End: 2022-06-23
Payer: COMMERCIAL

## 2022-06-23 DIAGNOSIS — K21.00 GASTROESOPHAGEAL REFLUX DISEASE WITH ESOPHAGITIS WITHOUT HEMORRHAGE: ICD-10-CM

## 2022-06-23 DIAGNOSIS — R11.14 BILIOUS VOMITING WITH NAUSEA: ICD-10-CM

## 2022-06-23 DIAGNOSIS — N93.9 ABNORMAL UTERINE BLEEDING (AUB): ICD-10-CM

## 2022-06-23 PROCEDURE — A9541 TC99M SULFUR COLLOID: HCPCS

## 2022-06-23 PROCEDURE — 76856 US EXAM PELVIC COMPLETE: CPT

## 2022-06-23 PROCEDURE — 76830 TRANSVAGINAL US NON-OB: CPT

## 2022-06-23 PROCEDURE — G1004 CDSM NDSC: HCPCS

## 2022-06-23 PROCEDURE — 78264 GASTRIC EMPTYING IMG STUDY: CPT

## 2022-06-27 ENCOUNTER — HOSPITAL ENCOUNTER (OUTPATIENT)
Dept: CT IMAGING | Facility: HOSPITAL | Age: 47
Discharge: HOME | End: 2022-06-27
Payer: COMMERCIAL

## 2022-06-27 ENCOUNTER — TELEPHONE (OUTPATIENT)
Dept: INTERNAL MEDICINE CLINIC | Facility: CLINIC | Age: 47
End: 2022-06-27

## 2022-06-27 DIAGNOSIS — R06.09 POST-COVID CHRONIC DYSPNEA: ICD-10-CM

## 2022-06-27 DIAGNOSIS — R05.9 COUGH: Primary | ICD-10-CM

## 2022-06-27 DIAGNOSIS — R05.9 COUGH: ICD-10-CM

## 2022-06-27 DIAGNOSIS — R07.81 PLEURODYNIA: ICD-10-CM

## 2022-06-27 DIAGNOSIS — U09.9 POST-COVID CHRONIC DYSPNEA: ICD-10-CM

## 2022-06-27 DIAGNOSIS — R06.02 SHORTNESS OF BREATH: ICD-10-CM

## 2022-06-27 PROCEDURE — G1004 CDSM NDSC: HCPCS

## 2022-06-27 PROCEDURE — 71275 CT ANGIOGRAPHY CHEST: CPT

## 2022-06-27 RX ORDER — BENZONATATE 100 MG/1
100 CAPSULE ORAL 3 TIMES DAILY PRN
Qty: 20 CAPSULE | Refills: 0 | Status: SHIPPED | OUTPATIENT
Start: 2022-06-27 | End: 2022-06-28 | Stop reason: SDUPTHER

## 2022-06-27 RX ADMIN — IOHEXOL 75 ML: 350 INJECTION, SOLUTION INTRAVENOUS at 12:26

## 2022-06-27 NOTE — TELEPHONE ENCOUNTER
Patient had contacted our office today with complaints of pain in left side of chest, worse with coughing  Patient had been diagnosed with COVID 4 weeks ago and has recovered except for dry cough and left sided chest pain  CTA ordered stat to rule out PE given recent COVID  Patient is on Eliquis but does have history of clotting and there is concern for potential failure  CTA reveals no PE  Called patient with results but patient did not answer  LM stating our office will contact patient again  Refilled pain medication for another total 21 tablets  Tessalon pearls for cough ordered

## 2022-06-28 ENCOUNTER — TELEPHONE (OUTPATIENT)
Dept: OBGYN CLINIC | Facility: CLINIC | Age: 47
End: 2022-06-28

## 2022-06-28 DIAGNOSIS — R05.9 COUGH: ICD-10-CM

## 2022-06-28 DIAGNOSIS — R07.81 RIB PAIN: Primary | ICD-10-CM

## 2022-06-28 RX ORDER — BENZONATATE 100 MG/1
100 CAPSULE ORAL 3 TIMES DAILY PRN
Qty: 30 CAPSULE | Refills: 0 | Status: SHIPPED | OUTPATIENT
Start: 2022-06-28 | End: 2022-08-01

## 2022-06-28 RX ORDER — OXYCODONE HYDROCHLORIDE 10 MG/1
10 TABLET ORAL EVERY 8 HOURS PRN
Qty: 21 TABLET | Refills: 0 | Status: SHIPPED | OUTPATIENT
Start: 2022-06-28 | End: 2022-08-01 | Stop reason: ALTCHOICE

## 2022-06-28 NOTE — RESULT ENCOUNTER NOTE
Pelvic US is normal  Still needs to do labs  If she is still bleeding we can consider starting therapy prior to her July appt, otherwise we can assess other work up and plan for therapy at that time

## 2022-06-28 NOTE — TELEPHONE ENCOUNTER
----- Message from Janice Hernandez MD sent at 6/28/2022  7:31 AM EDT -----  Pelvic US is normal  Still needs to do labs  If she is still bleeding we can consider starting therapy prior to her July appt, otherwise we can assess other work up and plan for therapy at that time

## 2022-06-30 ENCOUNTER — TELEPHONE (OUTPATIENT)
Dept: OTHER | Facility: OTHER | Age: 47
End: 2022-06-30

## 2022-07-07 ENCOUNTER — OFFICE VISIT (OUTPATIENT)
Dept: OBGYN CLINIC | Facility: MEDICAL CENTER | Age: 47
End: 2022-07-07
Payer: COMMERCIAL

## 2022-07-07 VITALS
DIASTOLIC BLOOD PRESSURE: 80 MMHG | HEIGHT: 62 IN | SYSTOLIC BLOOD PRESSURE: 110 MMHG | BODY MASS INDEX: 20.57 KG/M2 | WEIGHT: 111.8 LBS

## 2022-07-07 DIAGNOSIS — N93.9 ABNORMAL UTERINE BLEEDING (AUB): ICD-10-CM

## 2022-07-07 DIAGNOSIS — Z32.02 NEGATIVE PREGNANCY TEST: Primary | ICD-10-CM

## 2022-07-07 LAB — SL AMB POCT URINE HCG: NEGATIVE

## 2022-07-07 PROCEDURE — 99213 OFFICE O/P EST LOW 20 MIN: CPT | Performed by: STUDENT IN AN ORGANIZED HEALTH CARE EDUCATION/TRAINING PROGRAM

## 2022-07-07 PROCEDURE — 58100 BIOPSY OF UTERUS LINING: CPT | Performed by: STUDENT IN AN ORGANIZED HEALTH CARE EDUCATION/TRAINING PROGRAM

## 2022-07-07 PROCEDURE — 88305 TISSUE EXAM BY PATHOLOGIST: CPT | Performed by: PATHOLOGY

## 2022-07-07 PROCEDURE — 81025 URINE PREGNANCY TEST: CPT | Performed by: STUDENT IN AN ORGANIZED HEALTH CARE EDUCATION/TRAINING PROGRAM

## 2022-07-07 RX ORDER — NALOXONE HYDROCHLORIDE 4 MG/.1ML
4 SPRAY NASAL
COMMUNITY
Start: 2022-06-06

## 2022-07-07 RX ORDER — ACETAMINOPHEN AND CODEINE PHOSPHATE 120; 12 MG/5ML; MG/5ML
1 SOLUTION ORAL DAILY
Qty: 28 TABLET | Refills: 6 | Status: SHIPPED | OUTPATIENT
Start: 2022-07-07 | End: 2022-08-01

## 2022-07-07 NOTE — ASSESSMENT & PLAN NOTE
Labs not resulted  US normal  EMB today to rule out hyperplasia    Discussed observation, medication, surgery  Not E candidate due to blood clots and overall poor surgical candidate for major surgery  She would like to start with POP, consider cyclic progestin oral if not successful

## 2022-07-07 NOTE — PROGRESS NOTES
Assessment/Plan:   Abnormal uterine bleeding (AUB)  Labs not resulted  US normal  EMB today to rule out hyperplasia    Discussed observation, medication, surgery  Not E candidate due to blood clots and overall poor surgical candidate for major surgery  She would like to start with POP, consider cyclic progestin oral if not successful  Diagnoses and all orders for this visit:    Negative pregnancy test  -     POCT urine HCG    Other orders  -     naloxone (NARCAN) 4 mg/0 1 mL nasal spray; 4 mg into each nostril          Subjective:     Patient ID: Rajinder Martinez is a 42 y o  female  43 yo here for problem visit  She had a 4+week episode of bleeding  Late may had a heavy week of bleeding then persistent bleeding until 2-3 days ago  She has previously used COCP and depo in her 25s for contraception  Was not able to conceive and does not use contraception currently  She does have a history of multiple blood clots  She would love a hysterectomy but realizes this is a major procedure and she is a high risk patient  Review of Systems   Constitutional: Negative for chills and fever  HENT: Negative for ear pain and sore throat  Eyes: Negative for pain and visual disturbance  Respiratory: Negative for cough and shortness of breath  Cardiovascular: Negative for chest pain and palpitations  Gastrointestinal: Negative for abdominal pain, constipation, diarrhea, nausea and vomiting  Genitourinary: Positive for menstrual problem  Negative for dyspareunia, dysuria, frequency, hematuria, urgency, vaginal bleeding, vaginal discharge and vaginal pain  Musculoskeletal: Negative for arthralgias and back pain  Skin: Negative for color change and rash  Neurological: Negative for seizures and syncope  All other systems reviewed and are negative  Objective:     Physical Exam  Vitals reviewed  Constitutional:       General: She is not in acute distress       Appearance: She is well-developed  She is not diaphoretic  HENT:      Head: Normocephalic and atraumatic  Pulmonary:      Effort: Pulmonary effort is normal  No respiratory distress  Genitourinary:     Labia:         Right: No rash, tenderness, lesion or injury  Left: No rash, tenderness, lesion or injury  Vagina: No vaginal discharge, erythema, tenderness or bleeding  Cervix: No friability, lesion, erythema or cervical bleeding  Uterus: Normal  Not enlarged and not tender  Adnexa: Right adnexa normal and left adnexa normal         Right: No mass, tenderness or fullness  Left: No mass, tenderness or fullness  Musculoskeletal:      Cervical back: Normal range of motion  Neurological:      Mental Status: She is alert and oriented to person, place, and time  Psychiatric:         Behavior: Behavior normal          Thought Content: Thought content normal          Judgment: Judgment normal            Endometrial biopsy    Date/Time: 7/7/2022 11:51 AM  Performed by: Yolanda Lafleur MD  Authorized by: Yolanda Lafleur MD   Universal Protocol:  Consent: Verbal consent obtained  Risks and benefits: risks, benefits and alternatives were discussed  Consent given by: patient  Patient understanding: patient states understanding of the procedure being performed  Patient consent: the patient's understanding of the procedure matches consent given  Patient identity confirmed: verbally with patient      Indication:     Indications:  Other disorder of menstruation and other abnormal bleeding from female genital tract    Procedure:     Procedure: endometrial biopsy with Pipelle      A bivalve speculum was placed in the vagina: yes      Cervix cleaned and prepped: yes      Uterus sounded: yes      Uterus sound depth (cm):  8    Specimen collected: specimen collected and sent to pathology    Findings:     Uterus size:  Non-gravid    Cervix: normal      Adnexa: normal

## 2022-07-11 DIAGNOSIS — G89.4 CHRONIC PAIN SYNDROME: ICD-10-CM

## 2022-07-11 DIAGNOSIS — M79.671 RIGHT FOOT PAIN: ICD-10-CM

## 2022-07-11 DIAGNOSIS — M54.16 LUMBAR RADICULOPATHY: ICD-10-CM

## 2022-07-11 RX ORDER — PREGABALIN 100 MG/1
100 CAPSULE ORAL 2 TIMES DAILY
Qty: 60 CAPSULE | Refills: 0 | Status: SHIPPED | OUTPATIENT
Start: 2022-07-16 | End: 2022-08-26 | Stop reason: SDUPTHER

## 2022-07-25 ENCOUNTER — TELEPHONE (OUTPATIENT)
Dept: OBGYN CLINIC | Facility: CLINIC | Age: 47
End: 2022-07-25

## 2022-07-25 ENCOUNTER — HOSPITAL ENCOUNTER (EMERGENCY)
Facility: HOSPITAL | Age: 47
Discharge: HOME | End: 2022-07-25
Attending: EMERGENCY MEDICINE
Payer: COMMERCIAL

## 2022-07-25 ENCOUNTER — APPOINTMENT (EMERGENCY)
Dept: CT IMAGING | Facility: HOSPITAL | Age: 47
End: 2022-07-25
Payer: COMMERCIAL

## 2022-07-25 ENCOUNTER — TELEPHONE (OUTPATIENT)
Dept: GASTROENTEROLOGY | Facility: CLINIC | Age: 47
End: 2022-07-25

## 2022-07-25 ENCOUNTER — OFFICE VISIT (OUTPATIENT)
Dept: GASTROENTEROLOGY | Facility: CLINIC | Age: 47
End: 2022-07-25
Payer: COMMERCIAL

## 2022-07-25 VITALS
DIASTOLIC BLOOD PRESSURE: 95 MMHG | WEIGHT: 108 LBS | BODY MASS INDEX: 19.88 KG/M2 | HEIGHT: 62 IN | SYSTOLIC BLOOD PRESSURE: 150 MMHG | HEART RATE: 78 BPM

## 2022-07-25 VITALS
OXYGEN SATURATION: 97 % | TEMPERATURE: 98.8 F | SYSTOLIC BLOOD PRESSURE: 137 MMHG | HEART RATE: 74 BPM | RESPIRATION RATE: 20 BRPM | DIASTOLIC BLOOD PRESSURE: 84 MMHG

## 2022-07-25 DIAGNOSIS — K21.00 GASTROESOPHAGEAL REFLUX DISEASE WITH ESOPHAGITIS WITHOUT HEMORRHAGE: Primary | ICD-10-CM

## 2022-07-25 DIAGNOSIS — N20.0 NEPHROLITHIASIS: ICD-10-CM

## 2022-07-25 DIAGNOSIS — K31.84 GASTROPARESIS DUE TO DM (HCC): ICD-10-CM

## 2022-07-25 DIAGNOSIS — E11.43 GASTROPARESIS DUE TO DM (HCC): ICD-10-CM

## 2022-07-25 DIAGNOSIS — N20.0 RENAL CALCULUS, LEFT: ICD-10-CM

## 2022-07-25 DIAGNOSIS — R10.9 ACUTE LEFT FLANK PAIN: Primary | ICD-10-CM

## 2022-07-25 LAB
ALBUMIN SERPL BCP-MCNC: 3.4 G/DL (ref 3.5–5)
ALP SERPL-CCNC: 108 U/L (ref 46–116)
ALT SERPL W P-5'-P-CCNC: 20 U/L (ref 12–78)
ANION GAP SERPL CALCULATED.3IONS-SCNC: 9 MMOL/L (ref 4–13)
AST SERPL W P-5'-P-CCNC: 15 U/L (ref 5–45)
BACTERIA UR QL AUTO: ABNORMAL /HPF
BASOPHILS # BLD AUTO: 0.01 THOUSANDS/ΜL (ref 0–0.1)
BASOPHILS NFR BLD AUTO: 0 % (ref 0–1)
BILIRUB DIRECT SERPL-MCNC: 0.09 MG/DL (ref 0–0.2)
BILIRUB SERPL-MCNC: 0.37 MG/DL (ref 0.2–1)
BILIRUB UR QL STRIP: NEGATIVE
BUN SERPL-MCNC: 26 MG/DL (ref 5–25)
CALCIUM SERPL-MCNC: 8.5 MG/DL (ref 8.3–10.1)
CHLORIDE SERPL-SCNC: 104 MMOL/L (ref 96–108)
CLARITY UR: CLEAR
CO2 SERPL-SCNC: 21 MMOL/L (ref 21–32)
COLOR UR: YELLOW
CREAT SERPL-MCNC: 1.15 MG/DL (ref 0.6–1.3)
EOSINOPHIL # BLD AUTO: 0.03 THOUSAND/ΜL (ref 0–0.61)
EOSINOPHIL NFR BLD AUTO: 0 % (ref 0–6)
ERYTHROCYTE [DISTWIDTH] IN BLOOD BY AUTOMATED COUNT: 12.8 % (ref 11.6–15.1)
EXT PREG TEST URINE: NEGATIVE
EXT. CONTROL ED NAV: NORMAL
GFR SERPL CREATININE-BSD FRML MDRD: 58 ML/MIN/1.73SQ M
GLUCOSE SERPL-MCNC: 117 MG/DL (ref 65–140)
GLUCOSE UR STRIP-MCNC: ABNORMAL MG/DL
HCT VFR BLD AUTO: 37.6 % (ref 34.8–46.1)
HGB BLD-MCNC: 11.6 G/DL (ref 11.5–15.4)
HGB UR QL STRIP.AUTO: ABNORMAL
IMM GRANULOCYTES # BLD AUTO: 0.05 THOUSAND/UL (ref 0–0.2)
IMM GRANULOCYTES NFR BLD AUTO: 1 % (ref 0–2)
KETONES UR STRIP-MCNC: NEGATIVE MG/DL
LEUKOCYTE ESTERASE UR QL STRIP: NEGATIVE
LIPASE SERPL-CCNC: 16 U/L (ref 73–393)
LYMPHOCYTES # BLD AUTO: 1.37 THOUSANDS/ΜL (ref 0.6–4.47)
LYMPHOCYTES NFR BLD AUTO: 19 % (ref 14–44)
MCH RBC QN AUTO: 28.3 PG (ref 26.8–34.3)
MCHC RBC AUTO-ENTMCNC: 30.9 G/DL (ref 31.4–37.4)
MCV RBC AUTO: 92 FL (ref 82–98)
MONOCYTES # BLD AUTO: 0.42 THOUSAND/ΜL (ref 0.17–1.22)
MONOCYTES NFR BLD AUTO: 6 % (ref 4–12)
NEUTROPHILS # BLD AUTO: 5.23 THOUSANDS/ΜL (ref 1.85–7.62)
NEUTS SEG NFR BLD AUTO: 74 % (ref 43–75)
NITRITE UR QL STRIP: NEGATIVE
NON-SQ EPI CELLS URNS QL MICRO: ABNORMAL /HPF
NRBC BLD AUTO-RTO: 0 /100 WBCS
OTHER STN SPEC: ABNORMAL
PH UR STRIP.AUTO: 5.5 [PH]
PLATELET # BLD AUTO: 188 THOUSANDS/UL (ref 149–390)
PMV BLD AUTO: 9.7 FL (ref 8.9–12.7)
POTASSIUM SERPL-SCNC: 4.6 MMOL/L (ref 3.5–5.3)
PROT SERPL-MCNC: 7.9 G/DL (ref 6.4–8.4)
PROT UR STRIP-MCNC: NEGATIVE MG/DL
RBC # BLD AUTO: 4.1 MILLION/UL (ref 3.81–5.12)
RBC #/AREA URNS AUTO: ABNORMAL /HPF
SODIUM SERPL-SCNC: 134 MMOL/L (ref 135–147)
SP GR UR STRIP.AUTO: 1.01 (ref 1–1.03)
UROBILINOGEN UR QL STRIP.AUTO: 0.2 E.U./DL
WBC # BLD AUTO: 7.11 THOUSAND/UL (ref 4.31–10.16)
WBC #/AREA URNS AUTO: ABNORMAL /HPF

## 2022-07-25 PROCEDURE — 96375 TX/PRO/DX INJ NEW DRUG ADDON: CPT

## 2022-07-25 PROCEDURE — G1004 CDSM NDSC: HCPCS

## 2022-07-25 PROCEDURE — 81025 URINE PREGNANCY TEST: CPT | Performed by: EMERGENCY MEDICINE

## 2022-07-25 PROCEDURE — 81001 URINALYSIS AUTO W/SCOPE: CPT | Performed by: EMERGENCY MEDICINE

## 2022-07-25 PROCEDURE — 96361 HYDRATE IV INFUSION ADD-ON: CPT

## 2022-07-25 PROCEDURE — 80048 BASIC METABOLIC PNL TOTAL CA: CPT | Performed by: EMERGENCY MEDICINE

## 2022-07-25 PROCEDURE — 99284 EMERGENCY DEPT VISIT MOD MDM: CPT

## 2022-07-25 PROCEDURE — 85025 COMPLETE CBC W/AUTO DIFF WBC: CPT | Performed by: EMERGENCY MEDICINE

## 2022-07-25 PROCEDURE — 99214 OFFICE O/P EST MOD 30 MIN: CPT | Performed by: INTERNAL MEDICINE

## 2022-07-25 PROCEDURE — 80076 HEPATIC FUNCTION PANEL: CPT | Performed by: EMERGENCY MEDICINE

## 2022-07-25 PROCEDURE — 74176 CT ABD & PELVIS W/O CONTRAST: CPT

## 2022-07-25 PROCEDURE — 99285 EMERGENCY DEPT VISIT HI MDM: CPT | Performed by: EMERGENCY MEDICINE

## 2022-07-25 PROCEDURE — 36415 COLL VENOUS BLD VENIPUNCTURE: CPT | Performed by: EMERGENCY MEDICINE

## 2022-07-25 PROCEDURE — 96374 THER/PROPH/DIAG INJ IV PUSH: CPT

## 2022-07-25 PROCEDURE — 83690 ASSAY OF LIPASE: CPT | Performed by: EMERGENCY MEDICINE

## 2022-07-25 RX ORDER — HYDROMORPHONE HCL/PF 1 MG/ML
0.5 SYRINGE (ML) INJECTION ONCE
Status: COMPLETED | OUTPATIENT
Start: 2022-07-25 | End: 2022-07-25

## 2022-07-25 RX ORDER — ONDANSETRON 2 MG/ML
4 INJECTION INTRAMUSCULAR; INTRAVENOUS ONCE
Status: COMPLETED | OUTPATIENT
Start: 2022-07-25 | End: 2022-07-25

## 2022-07-25 RX ORDER — MORPHINE SULFATE 4 MG/ML
4 INJECTION, SOLUTION INTRAMUSCULAR; INTRAVENOUS ONCE
Status: COMPLETED | OUTPATIENT
Start: 2022-07-25 | End: 2022-07-25

## 2022-07-25 RX ORDER — OXYCODONE HYDROCHLORIDE AND ACETAMINOPHEN 5; 325 MG/1; MG/1
1 TABLET ORAL EVERY 6 HOURS PRN
Qty: 8 TABLET | Refills: 0 | Status: SHIPPED | OUTPATIENT
Start: 2022-07-25 | End: 2022-08-01 | Stop reason: ALTCHOICE

## 2022-07-25 RX ADMIN — HYDROMORPHONE HYDROCHLORIDE 0.5 MG: 1 INJECTION, SOLUTION INTRAMUSCULAR; INTRAVENOUS; SUBCUTANEOUS at 18:26

## 2022-07-25 RX ADMIN — MORPHINE SULFATE 4 MG: 4 INJECTION INTRAVENOUS at 16:54

## 2022-07-25 RX ADMIN — ONDANSETRON 4 MG: 2 INJECTION INTRAMUSCULAR; INTRAVENOUS at 16:53

## 2022-07-25 RX ADMIN — SODIUM CHLORIDE 1000 ML: 0.9 INJECTION, SOLUTION INTRAVENOUS at 16:53

## 2022-07-25 NOTE — TELEPHONE ENCOUNTER
Scheduled date of EGD(as of today): 9/14/22  Physician performing EGD: Dr Eric Chappell   Location of EGD: [facility]  Instructions reviewed with patient by:kenia  Clearances: Will fax Eliquis clearance to Dr Daphne Choi of [facility] in [place] fax # [phone] /  # [phone]

## 2022-07-25 NOTE — TELEPHONE ENCOUNTER
----- Message from Lopez Mckeon sent at 7/25/2022 12:49 PM EDT -----  Regarding: Prescription   Hello! Thank you for the good biopsy results! Dr Dewey Welch and I had discussed birth control to help control/minimize my bleeding  I went to [facility] several days ago and found a new prescription from your office for a birth control pill that I am unable to take because I have blood clots and take Eliquis  We had primarily discussed either Depo-Provera or an IUD, I think at this point I would be more comfortable with the Depo  Can I schedule an appointment to come in to get the shot? Thanks in advance!    ETHEL Jenkins

## 2022-07-25 NOTE — PROGRESS NOTES
[facility] - Outpatient Follow-up Note  Richard Fortune 42 y o  female MRN: [id number]  Encounter: [id number]          ASSESSMENT AND PLAN:      1  Gastroesophageal reflux disease with esophagitis without hemorrhage  Last EGD was incomplete because large amount of food in the stomach, so visualization was very suboptimal   There was evidence of severe reflux esophagitis, mild Candida esophagitis in upper esophagus and large amount of food in stomach  Will schedule for repeat EGD, advised to continue with Dexilant 60 mg p o  q day, liquid Carafate 1 g 3 times a day and Reglan 10 mg 3 times a day  - EGD; Future    2  Gastroparesis due to DM [facility])  History of lung transplant for cystic fibrosis and post transplant she will be on steroid for lifelong, diabetes is related to chronic steroid use, she is currently on insulin, blood sugar fluctuated lot  Discuss about treatment option for gastroparesis including motility agent with Reglan or G POEM  - EGD; Future    3  Nephrolithiasis  She is complaining left-sided flank pain, history of nephrolithiasis, will order ultrasound of both kidney and bladder  - US kidney and bladder; Future    ______________________________________________________________________    SUBJECTIVE:  Patient seen and examined, she come for follow-up, she has a history of severe reflux along with intractable nausea and vomiting, upper endoscopy was done in April but there was large amount of solid food in the stomach, visualization was suboptimal   Subsequently gastric emptying study was done which confirms severe gastroparesis, after 4 hour less than 5% of the food emptying out from the stomach  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  Her blood sugar is fluctuating lot and she is currently on insulin    She is taking Dexilant, Carafate and Reglan but her symptoms still persist   She is here to discuss about further treatment plan with Botox injection in the stomach and then scheduling for TIF  She will be not a candidate for open surgery      REVIEW OF SYSTEMS IS OTHERWISE NEGATIVE  Historical Information   Past Medical History:   Diagnosis Date    ABPA (allergic bronchopulmonary aspergillosis) ([facility] [address] ) 7/8/2012    Acute kidney injury ([facility] [address] ) 9/8/2017    [facility] 3/4/2017    Last Assessment & Plan:  Formatting of this note might be different from the original  Recurrent with clinical worsening with enlarging PTX, s/p multiple chest tubes, but new fevers 6/19 and worsening leukocytosis indicating infection as cause of 6/19 decompensation with profound hypoxia in setting of shunting through right lung and continued pneumothorax s/p 2nd chest tube placement  6/27: Right    Asthma     Chronic pain     Cystic fibrosis ([facility] [address] )     Diabetes mellitus ([facility] [address] )     GERD (gastroesophageal reflux disease)     HBP (high blood pressure)     HCAP (healthcare-associated pneumonia) 3/6/2020    Influenza B 2/23/2020    Kidney stone     [facility]enic [facility] 9/26/2020    PONV (postoperative nausea and vomiting)     Pseudomonas aeruginosa infection 9/20/2012    Transplant recipient     Lung- 2013, 2017     Past Surgical History:   Procedure Laterality Date    APPENDECTOMY      AUGMENTATION MAMMAPLASTY Bilateral 2004    AUGMENTATION MAMMAPLASTY Right 2021    removed     BREAST SURGERY      Aug      BRONCHOSCOPY      CHOLECYSTECTOMY      COLONOSCOPY      EGD  07/2019    Yeast Infect       FL RETROGRADE PYELOGRAM  1/9/2020    FL RETROGRADE PYELOGRAM  2/4/2020    GASTROSTOMY TUBE, PLACE      IR PORT PLACEMENT  3/22/2022    IR PORT REMOVAL  1/10/2022    IR TUNNELED CENTRAL LINE PLACEMENT  1/13/2022    LUNG SURGERY      LUNG TRANSPLANT      X2    IN CYSTO/URETERO W/LITHOTRIPSY &INDWELL STENT INSRT Right 2/4/2020    Procedure: CYSTOSCOPY URETEROSCOPY WITH LITHOTRIPSY HOLMIUM LASER, RETROGRADE PYELOGRAM AND INSERTION STENT URETERAL;  Surgeon: Andreas Brar MD;  Location: [facility];  Service: Urology    HI CYSTOURETHROSCOPY,URETER CATHETER Right 1/9/2020    Procedure: CYSTOSCOPY RETROGRADE PYELOGRAM WITH INSERTION STENT URETERAL;  Surgeon: Andreas Brar MD;  Location: [facility];  Service: Urology   [facility]  1/17/2017     Social History   Social History     Substance and Sexual Activity   Alcohol Use Yes    Comment: social     Social History     Substance and Sexual Activity   Drug Use Yes    Frequency: 2 0 times per week    Types: Marijuana    Comment: medical edible marijuana prescribed     Social History     Tobacco Use   Smoking Status Former Smoker    Packs/day: 0 50    Years: 12 00    Pack years: 6 00   Smokeless Tobacco Never Used     Family History   Problem Relation Age of Onset    Alcohol abuse Mother     Mental illness Mother     Alcohol abuse Father     COPD Maternal Grandmother     Lung cancer Maternal Grandmother 72    COPD Maternal Grandfather     COPD Paternal Grandmother     Lung cancer Paternal Grandmother 72    No Known Problems Sister     No Known Problems Paternal Aunt     Breast cancer Neg Hx     Ovarian cancer Neg Hx     Colon cancer Neg Hx        Meds/Allergies       Current Outpatient Medications:     ALPRAZolam (XANAX) 1 mg tablet    apixaban (ELIQUIS) 5 mg    atovaquone (MEPRON) 750 mg/5 mL suspension    benzonatate (TESSALON PERLES) 100 mg capsule    dexlansoprazole (DEXILANT) 60 MG capsule    famotidine (PEPCID) 40 MG tablet    metoclopramide (Reglan) 10 mg tablet    naloxone (NARCAN) 4 mg/0 1 mL nasal spray    norethindrone (Ortho Micronor) 0 35 MG tablet    nystatin (MYCOSTATIN) 500,000 units/5 mL suspension    oxyCODONE (ROXICODONE) 10 MG TABS    pancrelipase, Lip-Prot-Amyl, (CREON) 24,000 units    predniSONE 10 mg tablet    pregabalin (Lyrica) 100 mg capsule    Respiratory Therapy Supplies (Nebulizer) SUKHI   sucralfate (CARAFATE) 1 g/10 mL suspension    tacrolimus (PROGRAF) 1 mg capsule    traZODone (DESYREL) 50 mg tablet    valACYclovir (VALTREX) 500 mg tablet    acetaminophen (TYLENOL) 325 mg tablet    albuterol (2 5 mg/3 mL) 0 083 % nebulizer solution    dronabinol (MARINOL) 5 MG capsule    insulin lispro (HumaLOG) 100 units/mL injection    Allergies   Allergen Reactions    Bebtelovimab Anaphylaxis    Vancomycin Angioedema and Hives    Gabapentin Hallucinations    Ceftazidime Headache     Severe headaches after desensitization    Nsaids Other (See Comments)     Lung transplant increases risk of renal toxicity, call lung txp provider to discuss if needed           Objective     Blood pressure 150/95, pulse 78, height 5' 2" (1 575 m), weight 49 kg (108 lb), not currently breastfeeding  Body mass index is 19 75 kg/m²  PHYSICAL EXAM:      General Appearance:   Alert, cooperative, no distress   HEENT:   Normocephalic, atraumatic, anicteric      Neck:  Supple, symmetrical, trachea midline   Lungs:   Clear to auscultation bilaterally; no rales, rhonchi or wheezing; respirations unlabored    Heart[de-identified]   Regular rate and rhythm; no murmur, rub, or gallop  Abdomen:   Soft, non-tender, non-distended; normal bowel sounds; no masses, no organomegaly , surgical scar on the anterior abdominal wall   Genitalia:   Deferred    Rectal:   Deferred    Extremities:  No cyanosis, clubbing or edema    Pulses:  2+ and symmetric    Skin:  No jaundice, rashes, or lesions    Lymph nodes:  No palpable cervical lymphadenopathy        Lab Results:   No visits with results within 1 Day(s) from this visit     Latest known visit with results is:   Office Visit on 07/07/2022   Component Date Value    URINE HCG 07/07/2022 negative     Case Report 07/07/2022                      Value:Surgical Pathology Report                         Case: [id number]                                   Authorizing Provider:  Ronald Lobo MD Collected:           07/07/2022 1156              Ordering Location:     [facility]:            07/07/2022 1156                                     [facility]                                                                          Gap                                                                          Pathologist:           Tete Bhat MD                                                    Specimen:    Endometrium, EMB                                                                           Final Diagnosis 07/07/2022                      Value: This result contains rich text formatting which cannot be displayed here   Additional Information 07/07/2022                      Value: This result contains rich text formatting which cannot be displayed here  Clement Dewitt Gross Description 07/07/2022                      Value: This result contains rich text formatting which cannot be displayed here  Radiology Results:   CTA chest pe study    Result Date: 6/27/2022  Narrative: CTA - CHEST WITH IV CONTRAST - PULMONARY ANGIOGRAM INDICATION:   R05 9: Cough, unspecified R07 81: Pleurodynia R06 02: Shortness of breath R06 09: Other forms of dyspnea U09 9: Post covid-19 condition, unspecified  COMPARISON: CT chest dated 1/7/2022  TECHNIQUE: CTA examination of the chest was performed using angiographic technique according to a protocol specifically tailored to evaluate for pulmonary embolism  Axial, sagittal, and coronal 2D reformatted images were created from the source data and  submitted for interpretation  In addition, coronal 3D MIP postprocessing was performed on the acquisition scanner  Radiation dose length product (DLP) for this visit:  157 mGy-cm     This examination, like all CT scans performed in the [facility], was performed utilizing techniques to minimize radiation dose exposure, including the use of iterative reconstruction and automated exposure control  IV Contrast:  75 mL of iohexol (OMNIPAQUE)  FINDINGS: PULMONARY ARTERIAL TREE:  No pulmonary embolus is seen  LUNGS:  Stable postsurgical changes of prior right pneumonectomy  Again noted is overexpansion of the left lung with extension into the anterior right chest   No left-sided focal airspace consolidation or new/enlarging pulmonary nodule  Few tiny stable  left lower lobe nodules measuring up to 3 mm  There is no tracheal or endobronchial lesion  PLEURA:  Small right pleural effusion, similar to prior  HEART/GREAT VESSELS:  Unremarkable for patient's age  No thoracic aortic aneurysm  MEDIASTINUM AND BRUCE:  Stable rightward mediastinal shift  CHEST WALL AND LOWER NECK:   Left breast prosthesis  Right chest wall port with tip in the right atrium  VISUALIZED STRUCTURES IN THE UPPER ABDOMEN:  Status post cholecystectomy  OSSEOUS STRUCTURES:  No acute fracture or destructive osseous lesion  Impression: No evidence for pulmonary embolism  Stable postsurgical changes of prior right pneumonectomy  Small right pleural effusion, similar to prior   Workstation performed: [id number]

## 2022-07-25 NOTE — ED PROVIDER NOTES
History  Chief Complaint   Patient presents with    Back Pain     Pt states she has had kidney stones in the past and is having the same type of pain      Patient is a 42-year-old female with past medical history significant for cystic fibrosis status post bilateral lung transplant with only the left lung functioning, on immunosuppressive therapy, steroid induced diabetes insulin-dependent, prior blood clots due to ECMO and currently on Eliquis, hypertension, GERD, nephrolithiasis, cholecystectomy, appendectomy, presents to the emergency department complaining of acute pain in her left flank that radiates into the left lower abdomen, similar to prior kidney stone related pain  Patient states that last night she felt pain in her left mid back/flank region and today it started traveling into her left lower back/flank and now radiating into her left lower abdomen  The pain feels similar to prior kidney stone related pain  She has had minimal nausea but no vomiting  She denies any other associated symptoms  Patient states she had COVID infection in early June and continues to have a cough but denies any significant shortness of breath worse than baseline  She denies any chest pain, fevers, chills, hemoptysis, palpitations, URI symptoms, abdominal distension, vomiting, change in bowel habits, dysuria, change in urinary frequency, gross hematuria, skin rash color change, extremity weakness or paresthesia or other focal neurologic deficits  Patient states that the prior kidney stones have been large and she is not always passed them on her own  Patient follows with KIANNA Mcdonnell for her transplant team       History provided by:  Patient   used: No    Back Pain  Associated symptoms: abdominal pain    Associated symptoms: no chest pain, no dysuria, no fever, no headaches, no numbness and no weakness        Prior to Admission Medications   Prescriptions Last Dose Informant Patient Reported? Taking? ALPRAZolam (XANAX) 1 mg tablet  Self No No   Sig: TAKE 1 TABLET (1 MG TOTAL) BY MOUTH DAILY AT BEDTIME AS NEEDED FOR ANXIETY OR SLEEP   Respiratory Therapy Supplies (Nebulizer) SUKHI  Self Yes No   Sig: Please dispense nebulizer machine   acetaminophen (TYLENOL) 325 mg tablet  Self No No   Sig: Take 3 tablets (975 mg total) by mouth every 8 (eight) hours   Patient not taking: No sig reported   albuterol (2 5 mg/3 mL) 0 083 % nebulizer solution  Self No No   Sig: Take 6 mL (5 mg total) by nebulization 2 (two) times a day   Patient not taking: No sig reported   apixaban (ELIQUIS) 5 mg  Self No No   Sig: Take 1 tablet (5 mg total) by mouth 2 (two) times a day   atovaquone (MEPRON) 750 mg/5 mL suspension  Self Yes No   Sig: Take 1,500 mg by mouth daily   benzonatate (TESSALON PERLES) 100 mg capsule  Self No No   Sig: Take 1 capsule (100 mg total) by mouth 3 (three) times a day as needed for cough   dexlansoprazole (DEXILANT) 60 MG capsule  Self No No   Sig: Take 1 capsule (60 mg total) by mouth in the morning     dronabinol (MARINOL) 5 MG capsule  Self No No   Sig: TAKE 1 CAPSULE (5 MG TOTAL) BY MOUTH 3 (THREE) TIMES A DAY   Patient not taking: No sig reported   famotidine (PEPCID) 40 MG tablet  Self No No   Sig: TAKE 1 TABLET BY MOUTH EVERYDAY AT BEDTIME   insulin lispro (HumaLOG) 100 units/mL injection  Self No No   Sig: Inject 4 Units under the skin 3 (three) times a day with meals   Patient not taking: No sig reported   metoclopramide (Reglan) 10 mg tablet  Self No No   Sig: Take 1 tablet (10 mg total) by mouth 3 (three) times a day with meals   naloxone (NARCAN) 4 mg/0 1 mL nasal spray  Self Yes No   Si mg into each nostril   norethindrone (Ortho Micronor) 0 35 MG tablet  Self No No   Sig: Take 1 tablet (0 35 mg total) by mouth daily   nystatin (MYCOSTATIN) 500,000 units/5 mL suspension  Self No No   Sig: Apply 5 mL (500,000 Units total) to the mouth or throat 4 (four) times a day   oxyCODONE (ROXICODONE) 10 MG TABS  Self No No   Sig: Take 1 tablet (10 mg total) by mouth every 8 (eight) hours as needed for moderate pain Max Daily Amount: 30 mg   pancrelipase, Lip-Prot-Amyl, (CREON) 24,000 units  Self Yes No   Sig: Take 48,000 Units by mouth 3 (three) times a day with meals   predniSONE 10 mg tablet  Self Yes No   Sig: Take 10 mg by mouth daily   pregabalin (Lyrica) 100 mg capsule  Self No No   Sig: Take 1 capsule (100 mg total) by mouth 2 (two) times a day   sucralfate (CARAFATE) 1 g/10 mL suspension  Self No No   Sig: Take 10 mL (1 g total) by mouth 4 (four) times a day (with meals and at bedtime)   tacrolimus (PROGRAF) 1 mg capsule  Self Yes No   Sig: Take 3 mg by mouth 2 (two) times a day   traZODone (DESYREL) 50 mg tablet  Self No No   Sig: Take 0 5 tablets (25 mg total) by mouth daily at bedtime   Patient taking differently: Take 25 mg by mouth daily at bedtime as needed   valACYclovir (VALTREX) 500 mg tablet  Self Yes No   Sig: Take 500 mg by mouth daily      Facility-Administered Medications: None       Past Medical History:   Diagnosis Date    ABPA (allergic bronchopulmonary aspergillosis) ([facility] ) 7/8/2012    Acute kidney injury ([facility] ) 9/8/2017    Acute on chronic respiratory failure ([facility] [address] ) 3/4/2017    Last Assessment & Plan:  Formatting of this note might be different from the original  Recurrent with clinical worsening with enlarging PTX, s/p multiple chest tubes, but new fevers 6/19 and worsening leukocytosis indicating infection as cause of 6/19 decompensation with profound hypoxia in setting of shunting through right lung and continued pneumothorax s/p 2nd chest tube placement    6/27: Right    Asthma     Chronic pain     Cystic fibrosis ([address] )     Diabetes mellitus ([address] )     GERD (gastroesophageal reflux disease)     HBP (high blood pressure)     HCAP (healthcare-associated pneumonia) 3/6/2020    Influenza B 2/23/2020    Kidney stone     Neutropenic fever ([address] ) 9/26/2020    PONV (postoperative nausea and vomiting)     Pseudomonas aeruginosa infection 9/20/2012    Transplant recipient     Lung- 2013, 2017       Past Surgical History:   Procedure Laterality Date    APPENDECTOMY      AUGMENTATION MAMMAPLASTY Bilateral 2004    AUGMENTATION MAMMAPLASTY Right 2021    removed     BREAST SURGERY      Aug      BRONCHOSCOPY      CHOLECYSTECTOMY      COLONOSCOPY      EGD  07/2019    Yeast Infect   FL RETROGRADE PYELOGRAM  1/9/2020    FL RETROGRADE PYELOGRAM  2/4/2020    GASTROSTOMY TUBE, PLACE      IR PORT PLACEMENT  3/22/2022    IR PORT REMOVAL  1/10/2022    IR TUNNELED CENTRAL LINE PLACEMENT  1/13/2022    LUNG SURGERY      LUNG TRANSPLANT      X2    TN CYSTO/URETERO W/LITHOTRIPSY &INDWELL STENT INSRT Right 2/4/2020    Procedure: CYSTOSCOPY URETEROSCOPY WITH LITHOTRIPSY HOLMIUM LASER, RETROGRADE PYELOGRAM AND INSERTION STENT URETERAL;  Surgeon: Annabelle Connors MD;  Location: [facility];  Service: Urology    TN CYSTOURETHROSCOPY,URETER CATHETER Right 1/9/2020    Procedure: CYSTOSCOPY RETROGRADE PYELOGRAM WITH INSERTION STENT URETERAL;  Surgeon: Annabelle Connors MD;  Location: [facility];  Service: Urology    US GUIDED VASCULAR ACCESS  1/17/2017       Family History   Problem Relation Age of Onset    Alcohol abuse Mother     Mental illness Mother     Alcohol abuse Father     COPD Maternal Grandmother     Lung cancer Maternal Grandmother 72    COPD Maternal Grandfather     COPD Paternal Grandmother     Lung cancer Paternal Grandmother 72    No Known Problems Sister     No Known Problems Paternal Aunt     Breast cancer Neg Hx     Ovarian cancer Neg Hx     Colon cancer Neg Hx      I have reviewed and agree with the history as documented      E-Cigarette/Vaping    E-Cigarette Use Never User      E-Cigarette/Vaping Substances     Social History     Tobacco Use    Smoking status: Former Smoker     Packs/day: 0 50     Years: 12 00     Pack years: 6 00    Smokeless tobacco: Never Used   Vaping Use    Vaping Use: Never used   Substance Use Topics    Alcohol use: Yes     Comment: social    Drug use: Yes     Frequency: 2 0 times per week     Types: Marijuana     Comment: medical edible marijuana prescribed       Review of Systems   Constitutional: Negative for chills and fever  HENT: Negative for congestion, ear pain, rhinorrhea and sore throat  Respiratory: Positive for cough  Negative for chest tightness, shortness of breath and wheezing  Cardiovascular: Negative for chest pain and palpitations  Gastrointestinal: Positive for abdominal pain and nausea  Negative for abdominal distention, blood in stool, constipation, diarrhea and vomiting  Genitourinary: Positive for flank pain  Negative for difficulty urinating, dysuria, frequency and hematuria  Musculoskeletal: Positive for back pain  Negative for neck pain and neck stiffness  Skin: Negative for color change, pallor, rash and wound  Allergic/Immunologic: Positive for immunocompromised state  Neurological: Negative for dizziness, syncope, weakness, light-headedness, numbness and headaches  Hematological: Negative for adenopathy  Psychiatric/Behavioral: Negative for confusion and decreased concentration  All other systems reviewed and are negative  Physical Exam  Physical Exam  Vitals and nursing note reviewed  Constitutional:       General: She is not in acute distress  Appearance: Normal appearance  She is well-developed  She is not ill-appearing, toxic-appearing or diaphoretic  HENT:      Head: Normocephalic and atraumatic  Right Ear: External ear normal       Left Ear: External ear normal       Mouth/Throat:      Comments: Orpharyngeal exam deferred at this time due to risk of exposure to COVID-19 during current pandemic  Patient has no oropharyngeal complaints  Eyes:      Extraocular Movements: Extraocular movements intact        Conjunctiva/sclera: Conjunctivae normal    Neck:      Vascular: No JVD  Cardiovascular:      Rate and Rhythm: Normal rate and regular rhythm  Pulses: Normal pulses  Heart sounds: Normal heart sounds  No murmur heard  No friction rub  No gallop  Pulmonary:      Effort: Pulmonary effort is normal  No respiratory distress  Breath sounds: Normal breath sounds  No wheezing, rhonchi or rales  Abdominal:      General: There is no distension  Palpations: Abdomen is soft  Tenderness: There is abdominal tenderness  There is left CVA tenderness  There is no right CVA tenderness, guarding or rebound  Comments: +LLQ abdominal tenderness  Musculoskeletal:         General: No deformity or signs of injury  Normal range of motion  Cervical back: Normal range of motion and neck supple  No rigidity  Skin:     General: Skin is warm and dry  Coloration: Skin is not pale  Findings: No erythema or rash  Neurological:      General: No focal deficit present  Mental Status: She is alert and oriented to person, place, and time  Sensory: No sensory deficit  Motor: No weakness     Psychiatric:         Mood and Affect: Mood normal          Behavior: Behavior normal          Vital Signs  ED Triage Vitals   Temperature Pulse Respirations Blood Pressure SpO2   07/25/22 1516 07/25/22 1516 07/25/22 1516 07/25/22 1516 07/25/22 1516   98 8 °F (37 1 °C) 86 (!) 32 (!) 180/96 99 %      Temp Source Heart Rate Source Patient Position - Orthostatic VS BP Location FiO2 (%)   07/25/22 1516 07/25/22 1516 07/25/22 1516 07/25/22 1516 --   Oral Monitor Sitting Left arm       Pain Score       07/25/22 1654       7         Vitals:    07/25/22 1516 07/25/22 1809   BP: (!) 180/96 137/84   BP Location: Left arm Right arm   Pulse: 86 74   Resp: (!) 32 20   Temp: 98 8 °F (37 1 °C)    TempSrc: Oral    SpO2: 99% 97%       Visual Acuity      ED Medications  Medications   sodium chloride 0 9 % bolus 1,000 mL (1,000 mL Intravenous New Bag 7/25/22 1653)   ondansetron [facility]) injection 4 mg (4 mg Intravenous Given 7/25/22 1653)   morphine injection 4 mg (4 mg Intravenous Given 7/25/22 1654)   HYDROmorphone (DILAUDID) injection 0 5 mg (0 5 mg Intravenous Given 7/25/22 1826)       Diagnostic Studies  Results Reviewed     Procedure Component Value Units Date/Time    Basic metabolic panel [[id number]]  (Abnormal) Collected: 07/25/22 1656    Lab Status: Final result Specimen: Blood from Central Venous Line Updated: 07/25/22 1726     Sodium 134 mmol/L      Potassium 4 6 mmol/L      Chloride 104 mmol/L      CO2 21 mmol/L      ANION GAP 9 mmol/L      BUN 26 mg/dL      Creatinine 1 15 mg/dL      Glucose 117 mg/dL      Calcium 8 5 mg/dL      eGFR 58 ml/min/1 73sq m     Narrative:      [place] guidelines for Chronic Kidney Disease (CKD):     Stage 1 with normal or high GFR (GFR > 90 mL/min/1 73 square meters)    Stage 2 Mild CKD (GFR = 60-89 mL/min/1 73 square meters)    Stage 3A Moderate CKD (GFR = 45-59 mL/min/1 73 square meters)    Stage 3B Moderate CKD (GFR = 30-44 mL/min/1 73 square meters)    Stage 4 Severe CKD (GFR = 15-29 mL/min/1 73 square meters)    Stage 5 End Stage CKD (GFR <15 mL/min/1 73 square meters)  Note: GFR calculation is accurate only with a steady state creatinine    Hepatic function panel [[id number]]  (Abnormal) Collected: 07/25/22 1656    Lab Status: Final result Specimen: Blood from Central Venous Line Updated: 07/25/22 1726     Total Bilirubin 0 37 mg/dL      Bilirubin, Direct 0 09 mg/dL      Alkaline Phosphatase 108 U/L      AST 15 U/L      ALT 20 U/L      Total Protein 7 9 g/dL      Albumin 3 4 g/dL     Lipase [[id number]]  (Abnormal) Collected: 07/25/22 1656    Lab Status: Final result Specimen: Blood from Central Venous Line Updated: 07/25/22 1726     Lipase 16 u/L     CBC and differential [[id number]]  (Abnormal) Collected: 07/25/22 1656    Lab Status: Final result Specimen: Blood from Central Venous Line Updated: 07/25/22 1707 WBC 7 11 Thousand/uL      RBC 4 10 Million/uL      Hemoglobin 11 6 g/dL      Hematocrit 37 6 %      MCV 92 fL      MCH 28 3 pg      MCHC 30 9 g/dL      RDW 12 8 %      MPV 9 7 fL      Platelets 609 Thousands/uL      nRBC 0 /100 WBCs      Neutrophils Relative 74 %      Immat GRANS % 1 %      Lymphocytes Relative 19 %      Monocytes Relative 6 %      Eosinophils Relative 0 %      Basophils Relative 0 %      Neutrophils Absolute 5 23 Thousands/µL      Immature Grans Absolute 0 05 Thousand/uL      Lymphocytes Absolute 1 37 Thousands/µL      Monocytes Absolute 0 42 Thousand/µL      Eosinophils Absolute 0 03 Thousand/µL      Basophils Absolute 0 01 Thousands/µL     Urine Microscopic [[id number]]  (Abnormal) Collected: 07/25/22 1609    Lab Status: Final result Specimen: Urine, Clean Catch Updated: 07/25/22 1658     RBC, UA 4-10 /hpf      WBC, UA None Seen /hpf      Epithelial Cells Occasional /hpf      Bacteria, UA Occasional /hpf      OTHER OBSERVATIONS Yeast Cells Present    UA (URINE) with reflex to Scope [[id number]]  (Abnormal) Collected: 07/25/22 1609    Lab Status: Final result Specimen: Urine, Clean Catch Updated: 07/25/22 1633     Color, UA Yellow     Clarity, UA Clear     Specific Gravity, UA 1 010     pH, UA 5 5     Leukocytes, UA Negative     Nitrite, UA Negative     Protein, UA Negative mg/dl      Glucose,  (1/10%) mg/dl      Ketones, UA Negative mg/dl      Urobilinogen, UA 0 2 E U /dl      Bilirubin, UA Negative     Occult Blood, UA Moderate    POCT pregnancy, urine [[id number]]  (Normal) Resulted: 07/25/22 1609    Lab Status: Final result Updated: 07/25/22 1610     EXT PREG TEST UR (Ref: Negative) negative     Control valid                 CT abdomen pelvis wo contrast   Final Result by Annel Gunter MD (07/25 1653)      Nonobstructing calculus lower pole left kidney, measuring 1 8 cm   No obstructing ureteral calculus seen    Fecalization of the content of the distal small bowel/ileal loops compatible with the prolonged bowel transit time and stasis      No bowel obstruction   Mild colonic wall thickening, unchanged from the previous study of January 7, 2020      Less than 6 mm nodules at the left lung base, stable since the previous study of January 7, 2022, these can be evaluated for stability with follow-up at one year      Workstation performed: [id number]                    Procedures  Procedures         ED Course  ED Course as of 07/25/22 1845   Mon Jul 25, 2022   1638 Blood, UA(!): Moderate   1638 Leukocytes, UA: Negative   1638 Nitrite, UA: Negative   1820 Updated patient about workup thus far including large intrarenal calculus measuring 1 8 cm  I advised that majority of the time the stones that are in the kidney do not cause pain however given the size of the stone, will touch base with Urology  Patient still having pain so will give another dose of pain medication  Updated her of other incidental CT findings all of which patient was already aware of      [address] texted on-call urology AP, Rusty Paytonert 89 with urology PA who feels that pain is less likely related to the stable kidney stone within the left kidney and she recommended outpatient Urology follow-up to discuss surgical options  No acute intervention to be done at this time  1843 Updated patient about conversation with Urology  Will give referral for urology office follow-up  Discussed symptom management at home and when to return to the ER  I also encouraged close PCP follow-up  SBIRT 22yo+    Flowsheet Row Most Recent Value   SBIRT (25 yo +)    In order to provide better care to our patients, we are screening all of our patients for alcohol and drug use  Would it be okay to ask you these screening questions? Yes Filed at: 07/25/2022 7228   Initial Alcohol Screen: US AUDIT-C     1  How often do you have a drink containing alcohol? 0 Filed at: 07/25/2022 3658   2   How many drinks containing alcohol do you have on a typical day you are drinking? 0 Filed at: 07/25/2022 1558   3b  FEMALE Any Age, or MALE 65+: How often do you have 4 or more drinks on one occassion? 0 Filed at: 07/25/2022 1558   Audit-C Score 0 Filed at: 07/25/2022 1558   NICOLE: How many times in the past year have you    Used an illegal drug or used a prescription medication for non-medical reasons? Never Filed at: 07/25/2022 1558                    [facility]  Number of Diagnoses or Management Options  Diagnosis management comments: 21-year-old female with history of cystic fibrosis and lung transplant on immunosuppressive therapy, history of nephrolithiasis, presents to the emergency department complaining of acute left flank pain radiating into the left lower abdomen, similar to prior kidney stones  Patient likely passing a kidney stone  Other considerations include UTI pyelonephritis, nonspecific enteritis, colitis, diverticulitis  Will workup with abdominal labs, urinalysis and CT abdomen and pelvis without contrast   Will give IV fluids, morphine and Zofran for symptom relief  Amount and/or Complexity of Data Reviewed  Clinical lab tests: reviewed and ordered  Tests in the radiology section of CPT®: ordered and reviewed  Independent visualization of images, tracings, or specimens: yes        Disposition  Final diagnoses:   Acute left flank pain   Renal calculus, left     Time reflects when diagnosis was documented in both MDM as applicable and the Disposition within this note     Time User Action Codes Description Comment    7/25/2022  6:43 PM Jessica DAVENPORT Add [R10 9] Acute left flank pain     7/25/2022  6:43 PM Jessica DAVENPORT Add [N20 0] Renal calculus, left       ED Disposition     ED Disposition   Discharge    Condition   Stable    Date/Time   Mon Jul 25, 2022  6:43 PM    Comment   Esther Fortune discharge to home/self care                 Follow-up Information     Follow up With Specialties Details Why Contact Info Additional Information    Analy Holcomb MD Internal Medicine Schedule an appointment as soon as possible for a visit   [address] [facility] Schedule an appointment as soon as possible for a visit   [address]  [address] [address] [phone] [facility], [address], [place], [place], [address] [facility] Emergency Medicine Go to  If symptoms worsen [facility] [address] [facility], [address] [place], [place], [address]          Patient's Medications   Discharge Prescriptions    OXYCODONE-ACETAMINOPHEN (PERCOCET) 5-325 MG PER TABLET    Take 1 tablet by mouth every 6 (six) hours as needed for moderate pain or severe pain for up to 10 days Max Daily Amount: 4 tablets       Start Date: 7/25/2022 End Date: 8/4/2022       Order Dose: 1 tablet       Quantity: 8 tablet    Refills: 0       No discharge procedures on file      PDMP Review       Value Time User    PDMP Reviewed  Yes 7/11/2022 12:18 PM Analy Holcomb MD          ED Provider  Electronically Signed by           Laverne Godfrey DO  07/25/22 3721

## 2022-07-26 ENCOUNTER — TELEPHONE (OUTPATIENT)
Dept: UROLOGY | Facility: AMBULATORY SURGERY CENTER | Age: 47
End: 2022-07-26

## 2022-07-26 NOTE — TELEPHONE ENCOUNTER
No obstructing stone seen on imaging therefore this nonobstructing stone is unlikely to be the cause of her pain  She can be seen for next available visit  If having severe pain she should go to the ER

## 2022-07-26 NOTE — TELEPHONE ENCOUNTER
Please Triage  New Patient    What is the reason for the patients appointment? Patient was seen in the ER on 07/25/22 for flank pain and it was found that she has an unobstructed left kidney stone 1 8mm  Patient states that she is a lot of pain  And slight nausea     What office location does the patient prefer? [place]    Imaging/Lab Results: epic    Do we accept the patient's insurance or is the patient Self-Pay? Insurance Provider: [employer]   Plan Type/Number:  Member ID#: Has the patient had any previous Urologist(s)? No    Have patient records been requested? If not are records showing in Epic: Epic    Has the patient had any outside testing done? Epic    Does the patient have a personal history of cancer?  No    Patient can be reached at [phone]

## 2022-07-26 NOTE — TELEPHONE ENCOUNTER
Patient scheduled 8/23/22 in [address] [facility] office  Knows to go to ER if pain is unbearable, patient will follow up with PCP as well

## 2022-07-27 NOTE — TELEPHONE ENCOUNTER
Faxed clearance to Dr Aranza Hunter [phone]  Will call their office in one week to make sure clearance request was received [phone]  HPI and Plan:   See dictation    Orders Placed This Encounter   Procedures     EKG 12-lead complete w/read - Clinics (performed today)       No orders of the defined types were placed in this encounter.      There are no discontinued medications.      Encounter Diagnosis   Name Primary?     Chronic atrial fibrillation (H)        CURRENT MEDICATIONS:  Current Outpatient Prescriptions   Medication Sig Dispense Refill     furosemide (LASIX) 40 MG tablet One tablet in am 135 tablet 3     ipratropium (ATROVENT) 0.06 % spray USE 2 SPRAYS IN EACH NOSTRIL FOUR TIMES DAILY AS NEEDED FOR RHINITIS 45 mL 0     digoxin (LANOXIN) 125 MCG tablet Take 1 tablet (125 mcg) by mouth daily 90 tablet 3     metoprolol (LOPRESSOR) 50 MG tablet Take 2 tablets (100 mg) by mouth 2 times daily 360 tablet 3     lisinopril (PRINIVIL/ZESTRIL) 10 MG tablet Take 0.5 tablets (5 mg) by mouth 2 times daily 90 tablet 1     simvastatin (ZOCOR) 10 MG tablet TAKE 1 TABLET(10 MG) BY MOUTH AT BEDTIME 90 tablet 3     warfarin (COUMADIN) 2 MG tablet 1 1/2 pills per day 135 tablet 3     allopurinol (ZYLOPRIM) 300 MG tablet TAKE 1 TABLET BY MOUTH DAILY 90 tablet 3     ASPIRIN NOT PRESCRIBED (INTENTIONAL) Reported on 4/19/2017 0 each 0     Multiple Vitamin (MULTI-VITAMIN) per tablet Take 1 tablet by mouth daily. 100 tablet 12       ALLERGIES     Allergies   Allergen Reactions     Cardizem [Diltiazem]      Swelling and poor rate control       PAST MEDICAL HISTORY:  Past Medical History:   Diagnosis Date     Atrial fibrillation (H)     became chronic afib in 2016,paroxysmal,was on amiodarone but went into persistent afib in april 2106 and amiodrone was d/cd. now on BBLK and considering cardioversion.(5/2106).In Sept 2016 plan is rate control which has been a challenge and Holter in 6 months     CAD (coronary artery disease)     mild stenosis per cath     Cardiomyopathy (H)      Colon polyp 2010     Gout      HTN (hypertension)      Hyperlipidemia      Obesity (BMI  35.0-39.9 without comorbidity) (H)      SUJEY on CPAP        PAST SURGICAL HISTORY:  Past Surgical History:   Procedure Laterality Date     CT release       CYSTOSCOPY N/A 4/7/2016    Procedure: CYSTOSCOPY;  Surgeon: Lokesh Lima MD;  Location: [facility] OR     HC LEFT HEART CATHETERIZATION  3/2010    Mild CAD     LASER KTP TRANSURETHRAL RESECTION (TUR) PROSTATE N/A 4/7/2016    Procedure: LASER KTP TRANSURETHRAL RESECTION (TUR) PROSTATE;  Surgeon: Lokesh Lima MD;  Location: [facility] OR     ulnar reroute      right        FAMILY HISTORY:  Family History   Problem Relation Age of Onset     HEART DISEASE Mother 92     Heart failure     HEART DISEASE Father 92     C.A.D. Brother 70     MI     Myocardial Infarction Brother      Family History Negative Sister      Family History Negative Brother      Family History Negative Brother      Family History Negative Brother      Family History Negative Sister        SOCIAL HISTORY:  Social History     Social History     Marital status:      Spouse name: N/A     Number of children: N/A     Years of education: N/A     Social History Main Topics     Smoking status: Former Smoker     Packs/day: 1.00     Years: 5.00     Types: Cigarettes     Quit date: 4/3/1977     Smokeless tobacco: Former User     Quit date: 1/1/1960     Alcohol use No     Drug use: No     Sexual activity: Yes     Other Topics Concern     Parent/Sibling W/ Cabg, Mi Or Angioplasty Before 65f 55m? No     Caffeine Concern Yes     8 cups of  decaf coffee per day     Sleep Concern Yes     sleep apnea, wears CPAP     Stress Concern No     Weight Concern Yes     Weight gain     Special Diet No     Exercise No     Seat Belt Yes     Social History Narrative       Review of Systems:  Skin:  Negative       Eyes:  Positive for glasses    ENT:  Negative      Respiratory:  Positive for sleep apnea;CPAP;dyspnea on exertion;cough     Cardiovascular:    Positive for doesn't feel AFib  Gastroenterology: Negative      Genitourinary:   Negative      Musculoskeletal:  Positive for arthritis;back pain    Neurologic:  Negative      Psychiatric:  Negative      Heme/Lymph/Imm:  Negative      Endocrine:  Negative        [id number]

## 2022-07-28 NOTE — TELEPHONE ENCOUNTER
Received Eliquis clearance from Dr Duarte Harmon  Pt is cleared to hold Apixaban (Eliquis) 2 days prior to the procedure  I lmom informing pt of this and asked her to please call me back to let me know that she received my message     Will call her again in 2 weeks if do not hear back from her

## 2022-08-01 ENCOUNTER — TELEPHONE (OUTPATIENT)
Dept: OBGYN CLINIC | Facility: CLINIC | Age: 47
End: 2022-08-01

## 2022-08-01 ENCOUNTER — CLINICAL SUPPORT (OUTPATIENT)
Dept: OBGYN CLINIC | Facility: MEDICAL CENTER | Age: 47
End: 2022-08-01
Payer: MEDICARE

## 2022-08-01 ENCOUNTER — OFFICE VISIT (OUTPATIENT)
Dept: INTERNAL MEDICINE CLINIC | Facility: CLINIC | Age: 47
End: 2022-08-01
Payer: COMMERCIAL

## 2022-08-01 VITALS
BODY MASS INDEX: 20.24 KG/M2 | WEIGHT: 110 LBS | HEIGHT: 62 IN | SYSTOLIC BLOOD PRESSURE: 136 MMHG | DIASTOLIC BLOOD PRESSURE: 78 MMHG

## 2022-08-01 VITALS
HEIGHT: 62 IN | OXYGEN SATURATION: 96 % | SYSTOLIC BLOOD PRESSURE: 112 MMHG | DIASTOLIC BLOOD PRESSURE: 62 MMHG | WEIGHT: 111 LBS | BODY MASS INDEX: 20.43 KG/M2 | HEART RATE: 71 BPM | TEMPERATURE: 95.3 F

## 2022-08-01 DIAGNOSIS — E78.00 HYPERCHOLESTEREMIA: ICD-10-CM

## 2022-08-01 DIAGNOSIS — Z79.4 DIABETES MELLITUS DUE TO UNDERLYING CONDITION WITH CHRONIC KIDNEY DISEASE, WITH LONG-TERM CURRENT USE OF INSULIN, UNSPECIFIED CKD STAGE (HCC): ICD-10-CM

## 2022-08-01 DIAGNOSIS — N18.2 CKD (CHRONIC KIDNEY DISEASE) STAGE 2, GFR 60-89 ML/MIN: ICD-10-CM

## 2022-08-01 DIAGNOSIS — Z00.00 WELLNESS EXAMINATION: Primary | ICD-10-CM

## 2022-08-01 DIAGNOSIS — E08.22 DIABETES MELLITUS DUE TO UNDERLYING CONDITION WITH CHRONIC KIDNEY DISEASE, WITH LONG-TERM CURRENT USE OF INSULIN, UNSPECIFIED CKD STAGE (HCC): ICD-10-CM

## 2022-08-01 DIAGNOSIS — M81.0 OSTEOPOROSIS, UNSPECIFIED OSTEOPOROSIS TYPE, UNSPECIFIED PATHOLOGICAL FRACTURE PRESENCE: ICD-10-CM

## 2022-08-01 DIAGNOSIS — Z30.42 DEPO-PROVERA CONTRACEPTIVE STATUS: Primary | ICD-10-CM

## 2022-08-01 DIAGNOSIS — I10 ESSENTIAL HYPERTENSION: ICD-10-CM

## 2022-08-01 DIAGNOSIS — R07.81 RIB PAIN: ICD-10-CM

## 2022-08-01 DIAGNOSIS — R05.9 COUGH: ICD-10-CM

## 2022-08-01 DIAGNOSIS — K21.00 GASTROESOPHAGEAL REFLUX DISEASE WITH ESOPHAGITIS WITHOUT HEMORRHAGE: ICD-10-CM

## 2022-08-01 DIAGNOSIS — Z32.02 NEGATIVE PREGNANCY TEST: ICD-10-CM

## 2022-08-01 DIAGNOSIS — N20.0 RENAL CALCULUS: ICD-10-CM

## 2022-08-01 DIAGNOSIS — K31.84 GASTROPARESIS: ICD-10-CM

## 2022-08-01 DIAGNOSIS — E10.22 TYPE 1 DIABETES MELLITUS WITH DIABETIC CHRONIC KIDNEY DISEASE, UNSPECIFIED CKD STAGE (HCC): ICD-10-CM

## 2022-08-01 DIAGNOSIS — N93.9 ABNORMAL UTERINE BLEEDING (AUB): Primary | ICD-10-CM

## 2022-08-01 DIAGNOSIS — E84.9 CYSTIC FIBROSIS (HCC): ICD-10-CM

## 2022-08-01 PROBLEM — J96.11 CHRONIC RESPIRATORY FAILURE WITH HYPOXIA (HCC): Status: RESOLVED | Noted: 2021-09-07 | Resolved: 2022-08-01

## 2022-08-01 PROBLEM — D69.6 THROMBOCYTOPENIA (HCC): Status: RESOLVED | Noted: 2017-03-29 | Resolved: 2022-08-01

## 2022-08-01 PROBLEM — U07.1 COVID-19: Status: RESOLVED | Noted: 2022-06-03 | Resolved: 2022-08-01

## 2022-08-01 PROBLEM — B25.9 CYTOMEGALOVIRUS (CMV) VIREMIA (HCC): Status: RESOLVED | Noted: 2017-06-14 | Resolved: 2022-08-01

## 2022-08-01 PROBLEM — R78.81 GRAM-POSITIVE COCCI BACTEREMIA: Status: RESOLVED | Noted: 2022-01-08 | Resolved: 2022-08-01

## 2022-08-01 PROBLEM — R91.1 NODULE OF LEFT LUNG: Status: ACTIVE | Noted: 2022-08-01

## 2022-08-01 PROBLEM — Z93.4 JEJUNOSTOMY TUBE PRESENT (HCC): Status: ACTIVE | Noted: 2021-10-28

## 2022-08-01 PROBLEM — R53.83 FATIGUE: Status: RESOLVED | Noted: 2021-09-07 | Resolved: 2022-08-01

## 2022-08-01 PROBLEM — U07.1 COVID-19: Status: ACTIVE | Noted: 2022-06-03

## 2022-08-01 PROBLEM — T78.2XXA ANAPHYLAXIS: Status: ACTIVE | Noted: 2022-06-06

## 2022-08-01 PROBLEM — T78.2XXA ANAPHYLAXIS: Status: RESOLVED | Noted: 2022-06-06 | Resolved: 2022-08-01

## 2022-08-01 PROBLEM — R07.9 CHEST PAIN: Status: RESOLVED | Noted: 2021-09-21 | Resolved: 2022-08-01

## 2022-08-01 PROBLEM — Z03.89 CMV (CYTOMEGALOVIRUS INFECTION) STATUS NEGATIVE: Status: RESOLVED | Noted: 2017-03-01 | Resolved: 2022-08-01

## 2022-08-01 PROBLEM — I27.20 PULMONARY HYPERTENSION (HCC): Status: ACTIVE | Noted: 2021-09-09

## 2022-08-01 PROCEDURE — 3078F DIAST BP <80 MM HG: CPT | Performed by: INTERNAL MEDICINE

## 2022-08-01 PROCEDURE — G0438 PPPS, INITIAL VISIT: HCPCS | Performed by: INTERNAL MEDICINE

## 2022-08-01 PROCEDURE — 3066F NEPHROPATHY DOC TX: CPT | Performed by: PHYSICIAN ASSISTANT

## 2022-08-01 PROCEDURE — 3725F SCREEN DEPRESSION PERFORMED: CPT | Performed by: INTERNAL MEDICINE

## 2022-08-01 PROCEDURE — 3074F SYST BP LT 130 MM HG: CPT | Performed by: INTERNAL MEDICINE

## 2022-08-01 PROCEDURE — 81025 URINE PREGNANCY TEST: CPT

## 2022-08-01 PROCEDURE — 96372 THER/PROPH/DIAG INJ SC/IM: CPT

## 2022-08-01 RX ORDER — SUCRALFATE ORAL 1 G/10ML
1 SUSPENSION ORAL
Qty: 420 ML | Refills: 1 | Status: SHIPPED | OUTPATIENT
Start: 2022-08-01

## 2022-08-01 RX ORDER — HYDROCODONE POLISTIREX AND CHLORPHENIRAMINE POLISTIREX 10; 8 MG/5ML; MG/5ML
5 SUSPENSION, EXTENDED RELEASE ORAL EVERY 12 HOURS PRN
Qty: 120 ML | Refills: 0 | Status: SHIPPED | OUTPATIENT
Start: 2022-08-01 | End: 2022-10-25

## 2022-08-01 RX ORDER — MEDROXYPROGESTERONE ACETATE 150 MG/ML
150 INJECTION, SUSPENSION INTRAMUSCULAR
Qty: 1 ML | Refills: 3 | Status: SHIPPED | OUTPATIENT
Start: 2022-11-01

## 2022-08-01 RX ORDER — CYCLOBENZAPRINE HCL 5 MG
TABLET ORAL
Qty: 90 TABLET | Refills: 0 | Status: SHIPPED | OUTPATIENT
Start: 2022-08-01

## 2022-08-01 RX ORDER — MEDROXYPROGESTERONE ACETATE 150 MG/ML
150 INJECTION, SUSPENSION INTRAMUSCULAR
Qty: 1 ML | Refills: 0 | Status: SHIPPED | OUTPATIENT
Start: 2022-08-01 | End: 2022-08-01 | Stop reason: SDUPTHER

## 2022-08-01 NOTE — PROGRESS NOTES
Pt here to start depo provera, UPT negative today  Pt denies questions or concerns, depo given IM in LD, tolerated well   Pt scheduled next appt

## 2022-08-01 NOTE — PROGRESS NOTES
INTERNAL MEDICINE HEALTH MAINTENANCE OFFICE VISIT  [facility] - [facility] INTERNAL MEDICINE [place]    NAME: Jana Fortune  AGE: 42 y o  SEX: female  : 1975     DATE: 2022     Assessment and Plan:     1  Rib pain  Assessment & Plan:  · Likely MSK from persistant non-productive cough since COVID infection  Possibly post-inflammatory? Reviewed recent imaging  · See above plans  Orders:  -     cyclobenzaprine (FLEXERIL) 5 mg tablet; Take 1-2 tabs every 8 hours as needed for muscle pain/spasm    2  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, with long-term current use of insulin, unspecified CKD stage [facility])  Assessment & Plan:    Lab Results   Component Value Date    HGBA1C 6 3 (H) 2021   · Well controlled  Check A1c  Multifactorial, the setting of pancreatic insufficiency and chronic steroid use  · Continue ISS, dietary lifestyle modification will carbohydrate restriction  · Check microalbumin/creatinine ratio  · Referral to ophthalmology for diabetic retinopathy screening    Orders:  -     Hemoglobin A1C; Future  -     Microalbumin / creatinine urine ratio    3  Renal calculus  Assessment & Plan:  · Likely incidental   Patient wo CVA tenderness on exam, I suspect her left-sided rib pain is MSK in nature from chronic cough following recent covid  · Will f/u with urology for monitoring given size of stone  No obstructive features  · Encourage oral hydration      4  Osteoporosis, unspecified osteoporosis type, unspecified pathological fracture presence  Assessment & Plan:  · Inconsistent use of bisphosphanate therapy  Check DEXA scan  · Consideration for stopping bisphosphonate therapy given risk for pill-induced esophagitis, will discuss prolia alternatively    Orders:  -     Vitamin D 25 hydroxy; Future  -     DXA bone density spine hip and pelvis; Future; Expected date: 2022    5   Gastroparesis  Assessment & Plan:  · Repeat EGD planned, consideration for possible botox injection  · Continue Reglan TID      6  Hypercholesteremia  -     Lipid Panel with Direct LDL reflex; Future    7  Wellness examination  -     TSH, 3rd generation with Free T4 reflex; Future    8  CKD (chronic kidney disease) stage 2, GFR 60-89 ml/min  Assessment & Plan:  Lab Results   Component Value Date    EGFR 58 07/25/2022    EGFR 78 04/28/2022    EGFR 78 01/14/2022    CREATININE 1 15 07/25/2022    CREATININE 0 90 04/28/2022    CREATININE 0 90 01/14/2022   Stable  Avoid nephrotoxic agents  Monitor on PPI therapy  Avoid NSAIDS    Orders:  -     Comprehensive metabolic panel; Future    9  Essential hypertension  Assessment & Plan:  · Controlled      10  Cystic fibrosis ([facility] )  -     hydrocodone-chlorpheniramine polistirex (TUSSIONEX) 10-8 mg/5 mL ER suspension; Take 5 mL by mouth every 12 (twelve) hours as needed for cough Max Daily Amount: 10 mL    11  Gastroesophageal reflux disease with esophagitis without hemorrhage  Assessment & Plan:  · Continue PPI daily and H2 blocker  · Follows with GI  · Repeat endoscopy planned for further eval of severe erosive esophagitis  Orders:  -     sucralfate (CARAFATE) 1 g/10 mL suspension; Take 10 mL (1 g total) by mouth 4 (four) times a day (with meals and at bedtime)    12  Type 1 diabetes mellitus with diabetic chronic kidney disease, unspecified CKD stage [facility])  Assessment & Plan:    Lab Results   Component Value Date    HGBA1C 6 3 (H) 11/08/2021   · Well controlled  Check A1c  Multifactorial, the setting of pancreatic insufficiency and chronic steroid use  · Continue ISS, dietary lifestyle modification will carbohydrate restriction  · Check microalbumin/creatinine ratio  · Referral to ophthalmology for diabetic retinopathy screening    Orders:  -     Ambulatory Referral to Ophthalmology; Future    13  Cough  Assessment & Plan:  · CTA neg for PE or pneumonia  · Trial of tessalon for cough, no relief    Will give rx for Tussionex  · Has follow-up with her transplant team will be undergoing additional testing, there are concerns possible superimposed opportunistic infection, otherwise patient is systemically well  1  Patient Counseling:  · Nutrition: Stressed importance of moderation in sodium/caffeine intake, saturated fat, trans fat and cholesterol  Discussed portion control as well as increasing intake of fruits, vegetables, lean protein, and fish  · Exercise: Stressed the importance of regular exercise at least 150 mins/week  · Sexuality: Discussed sexually transmitted diseases, partner selection, use of condoms, avoidance of unintended pregnancy  and contraceptive alternatives  · Injury prevention: Discussed safety belts, safety helmets, smoke detector, smoking near bedding or upholstery  · Dental health: Discussed importance of regular tooth brushing, flossing, and dental visits  · Immunizations reviewed  Yes  · Discussed benefits of screening Yes  · Education was printed for patient      Return in about 3 months (around 11/1/2022)  Chief Complaint:     Chief Complaint   Patient presents with    Physical Exam     Fup kidney pain, post covid cough        History of Present Illness: Well Adult Physical   Patient here for a comprehensive physical exam The patient reports problems - Cough, rib pain    Diet and Physical Activity  Diet: No concerns  Weight concerns: Stable    Exercise: Participates in yoga sessions weekly     Depression Screen  Negative     General Health  Hearing: No concerns  Vision: No Concerns  Dental: regular dental visits and brushes teeth once daily      The following portions of the patient's history were reviewed and updated as appropriate: allergies, current medications, past family history, past medical history, past social history, past surgical history and problem list      Review of Systems:     Review of Systems   Constitutional: Negative for activity change, fatigue and fever    Respiratory: Positive for cough  Negative for shortness of breath and wheezing  Cardiovascular: Negative for chest pain and leg swelling  Gastrointestinal: Negative for abdominal pain, nausea and vomiting  Genitourinary: Negative for difficulty urinating  Musculoskeletal: Positive for arthralgias and myalgias  Neurological: Negative for dizziness, syncope, weakness, numbness and headaches  Past Medical History:     Past Medical History:   Diagnosis Date    ABPA (allergic bronchopulmonary aspergillosis) ([place] [address] ) 7/8/2012    Acute kidney injury ([place] [address] ) 9/8/2017    Acute on chronic respiratory failure ([place] [address] ) 3/4/2017    Last Assessment & Plan:  Formatting of this note might be different from the original  Recurrent with clinical worsening with enlarging PTX, s/p multiple chest tubes, but new fevers 6/19 and worsening leukocytosis indicating infection as cause of 6/19 decompensation with profound hypoxia in setting of shunting through right lung and continued pneumothorax s/p 2nd chest tube placement  6/27: Right    Anaphylaxis 6/6/2022    Asthma     Chest pain 9/21/2021    Chronic pain     Chronic respiratory failure with hypoxia ([place] [address] ) 9/7/2021    COVID-19 6/3/2022    Cystic fibrosis ([place] [address] )     Diabetes mellitus (HCC)     GERD (gastroesophageal reflux disease)     HBP (high blood pressure)     HCAP (healthcare-associated pneumonia) 3/6/2020    Influenza B 2/23/2020    Kidney stone     Neutropenic fever ([place] [address] ) 9/26/2020    Pneumonia due to Pseudomonas species ([place] [address] ) 10/28/2010    Last Assessment & Plan:  Formatting of this note might be different from the original  Polymicrobial pna w/ pseudomonas, M   Abscessus, Cadida glabrata/albicans    PONV (postoperative nausea and vomiting)     Pseudomonas aeruginosa infection 9/20/2012    Transplant recipient     Lung- 2013, 2017        Past Surgical History:     Past Surgical History:   Procedure Laterality Date    APPENDECTOMY      AUGMENTATION MAMMAPLASTY Bilateral 2004    AUGMENTATION MAMMAPLASTY Right     removed     BREAST SURGERY      Aug      BRONCHOSCOPY      CHOLECYSTECTOMY      COLONOSCOPY      EGD  2019    Yeast Infect       FL RETROGRADE PYELOGRAM  2020    FL RETROGRADE PYELOGRAM  2020    GASTROSTOMY TUBE, PLACE      IR PORT PLACEMENT  3/22/2022    IR PORT REMOVAL  1/10/2022    IR TUNNELED CENTRAL LINE PLACEMENT  2022    LUNG SURGERY      LUNG TRANSPLANT      X2    NV CYSTO/URETERO W/LITHOTRIPSY &INDWELL STENT INSRT Right 2020    Procedure: CYSTOSCOPY URETEROSCOPY WITH LITHOTRIPSY HOLMIUM LASER, RETROGRADE PYELOGRAM AND INSERTION STENT URETERAL;  Surgeon: Arely Julian MD;  Location: [facility];  Service: Urology    NV CYSTOURETHROSCOPY,URETER CATHETER Right 2020    Procedure: CYSTOSCOPY RETROGRADE PYELOGRAM WITH INSERTION STENT URETERAL;  Surgeon: Arely Julian MD;  [facility];  Service: Urology   [address] [place] [address]  2017        Social History:     Social History     Socioeconomic History    Marital status: /Civil Union     Spouse name: None    Number of children: None    Years of education: 15    Highest education level: None   Occupational History    Occupation: Disabled    Tobacco Use    Smoking status: Former Smoker     Packs/day: 0 50     Years: 12 00     Pack years: 6 00    Smokeless tobacco: Never Used   Vaping Use    Vaping Use: Never used   Substance and Sexual Activity    Alcohol use: Yes     Comment: social    Drug use: Yes     Frequency: 2 0 times per week     Types: Marijuana     Comment: medical edible marijuana prescribed    Sexual activity: Yes     Partners: Male     Birth control/protection: None, Injection   Other Topics Concern    None   Social History Narrative    Most recent tobacco use screenin2019    Do you currently or have you served in [employer]: No    Were you activated, into active duty, as a member of the [employer] or as a Reservist: No    Occupation: disabled    Education: 12    Marital status:     Exercise level: Occasional    Diet: Specific    low sugar, high fat    General stress level: High    Alcohol intake: Occasional    Caffeine intake: Heavy    Chewing tobacco: none    Illicit drugs: none    Guns present in home: No    Seat belts used routinely: Yes    Smoke alarm in home: Yes    Advance directive: Yes     Social Determinants of Health     Financial Resource Strain: Not on file   Food Insecurity: No Food Insecurity    Worried About Running Out of Food in the Last Year: Never true    April of Food in the Last Year: Never true   Transportation Needs: No Transportation Needs    Lack of Transportation (Medical): No    Lack of Transportation (Non-Medical):  No   Physical Activity: Not on file   Stress: Not on file   Social Connections: Not on file   Intimate Partner Violence: Not on file   Housing Stability: Low Risk     Unable to Pay for Housing in the Last Year: No    Number of Places Lived in the Last Year: 1    Unstable Housing in the Last Year: No        Family History:     Family History   Problem Relation Age of Onset    Alcohol abuse Mother     Mental illness Mother     Alcohol abuse Father     COPD Maternal Grandmother     Lung cancer Maternal Grandmother 72    COPD Maternal Grandfather     COPD Paternal Grandmother     Lung cancer Paternal Grandmother 72    No Known Problems Sister     No Known Problems Paternal Aunt     Breast cancer Neg Hx     Ovarian cancer Neg Hx     Colon cancer Neg Hx         Current Medications:     Outpatient Medications Prior to Visit   Medication Sig Dispense Refill    acetaminophen (TYLENOL) 325 mg tablet Take 3 tablets (975 mg total) by mouth every 8 (eight) hours  0    albuterol (2 5 mg/3 mL) 0 083 % nebulizer solution Take 6 mL (5 mg total) by nebulization 2 (two) times a day  0    ALPRAZolam (XANAX) 1 mg tablet TAKE 1 TABLET (1 MG TOTAL) BY MOUTH DAILY AT BEDTIME AS NEEDED FOR ANXIETY OR SLEEP 30 tablet 1    apixaban (ELIQUIS) 5 mg Take 1 tablet (5 mg total) by mouth 2 (two) times a day  0    atovaquone (MEPRON) 750 mg/5 mL suspension Take 1,500 mg by mouth daily      dexlansoprazole (DEXILANT) 60 MG capsule Take 1 capsule (60 mg total) by mouth in the morning   30 capsule 0    dronabinol (MARINOL) 5 MG capsule TAKE 1 CAPSULE (5 MG TOTAL) BY MOUTH 3 (THREE) TIMES A DAY 90 capsule 3    famotidine (PEPCID) 40 MG tablet TAKE 1 TABLET BY MOUTH EVERYDAY AT BEDTIME 90 tablet 1    insulin lispro (HumaLOG) 100 units/mL injection Inject 4 Units under the skin 3 (three) times a day with meals  0    [START ON 11/1/2022] medroxyPROGESTERone (DEPO-PROVERA) 150 mg/mL injection Inject 1 mL (150 mg total) into a muscle every 3 (three) months 1 mL 3    metoclopramide (Reglan) 10 mg tablet Take 1 tablet (10 mg total) by mouth 3 (three) times a day with meals 90 tablet 1    naloxone (NARCAN) 4 mg/0 1 mL nasal spray 4 mg into each nostril      nystatin (MYCOSTATIN) 500,000 units/5 mL suspension Apply 5 mL (500,000 Units total) to the mouth or throat 4 (four) times a day 180 mL 0    pancrelipase, Lip-Prot-Amyl, (CREON) 24,000 units Take 48,000 Units by mouth 3 (three) times a day with meals      predniSONE 10 mg tablet Take 10 mg by mouth daily      pregabalin (Lyrica) 100 mg capsule Take 1 capsule (100 mg total) by mouth 2 (two) times a day 60 capsule 0    Respiratory Therapy Supplies (Nebulizer) SUKHI Please dispense nebulizer machine      tacrolimus (PROGRAF) 1 mg capsule Take 3 mg by mouth 2 (two) times a day      traZODone (DESYREL) 50 mg tablet Take 0 5 tablets (25 mg total) by mouth daily at bedtime (Patient taking differently: Take 25 mg by mouth daily at bedtime as needed)  0    valACYclovir (VALTREX) 500 mg tablet Take 500 mg by mouth daily      sucralfate (CARAFATE) 1 g/10 mL suspension Take 10 mL (1 g total) by mouth 4 (four) times a day (with meals and at bedtime) 420 mL 1    benzonatate (TESSALON PERLES) 100 mg capsule Take 1 capsule (100 mg total) by mouth 3 (three) times a day as needed for cough (Patient not taking: Reported on 8/1/2022) 30 capsule 0    norethindrone (Ortho Micronor) 0 35 MG tablet Take 1 tablet (0 35 mg total) by mouth daily (Patient not taking: No sig reported) 28 tablet 6    oxyCODONE (ROXICODONE) 10 MG TABS Take 1 tablet (10 mg total) by mouth every 8 (eight) hours as needed for moderate pain Max Daily Amount: 30 mg (Patient not taking: Reported on 8/1/2022) 21 tablet 0    oxyCODONE-acetaminophen (Percocet) 5-325 mg per tablet Take 1 tablet by mouth every 6 (six) hours as needed for moderate pain or severe pain for up to 10 days Max Daily Amount: 4 tablets (Patient not taking: No sig reported) 8 tablet 0     No facility-administered medications prior to visit  Allergies: Allergies   Allergen Reactions    Bebtelovimab Anaphylaxis    Vancomycin Angioedema and Hives    Gabapentin Hallucinations    Ceftazidime Headache     Severe headaches after desensitization    Nsaids Other (See Comments)     Lung transplant increases risk of renal toxicity, call lung txp provider to discuss if needed        Objective:     Physical Exam:  Physical Exam  Vitals reviewed  Constitutional:       General: She is not in acute distress  Appearance: She is not ill-appearing or toxic-appearing  Cardiovascular:      Rate and Rhythm: Normal rate and regular rhythm  Heart sounds: Murmur heard  Pulmonary:      Effort: No respiratory distress  Breath sounds: No wheezing, rhonchi or rales  Comments: Decreased breath sounds right chest wall  Chest:      Chest wall: Tenderness (left lateral chest wall) present  Abdominal:      General: There is no distension  Palpations: Abdomen is soft  Tenderness: There is no abdominal tenderness   There is no right CVA tenderness, left CVA tenderness or guarding  Musculoskeletal:         General: No swelling  Skin:     General: Skin is dry  Neurological:      Mental Status: She is alert and oriented to person, place, and time  Mental status is at baseline            Data:       Health Maintenance:     Health Maintenance   Topic Date Due    Hepatitis C Screening  Never done    DM Eye Exam  Never done    URINE MICROALBUMIN  Never done    HIV Screening  Never done   [facility] Annual Wellness Visit (AWV)  11/05/2021    HEMOGLOBIN A1C  05/08/2022    COVID-19 Vaccine (5 - Booster for Moderna series) 06/17/2022    Influenza Vaccine (1) 09/01/2022    Diabetic Foot Exam  09/07/2022    Breast Cancer Screening: Mammogram  03/18/2023    Depression Screening  08/01/2023    BMI: Adult  08/01/2023    Cervical Cancer Screening  04/23/2026    Colorectal Cancer Screening  12/16/2029    Pneumococcal Vaccine: Pediatrics (0 to 5 Years) and At-Risk Patients (6 to 59 Years) (4 - PPSV23 or PCV20) 12/18/2042    HIB Vaccine  Aged Out    Hepatitis B Vaccine  Aged Out    IPV Vaccine  Aged Out    Hepatitis A Vaccine  Aged Out    Meningococcal ACWY Vaccine  Aged Out    HPV Vaccine  Aged Out     Immunization History   Administered Date(s) Administered    COVID-19 MODERNA VACC 0 5 ML IM 01/25/2021, 02/19/2021, 08/18/2021, 02/17/2022    INFLUENZA 11/05/2012, 10/23/2013, 09/05/2014, 10/01/2014, 10/30/2015, 11/15/2016, 10/02/2017, 10/17/2018, 11/15/2019, 10/21/2020, 10/29/2021    Influenza Quadrivalent 3 years and older 10/16/2020    Influenza Split Preservative Free ID 10/23/2013, 09/05/2014    Pneumococcal Conjugate 13-Valent 11/15/2019    Pneumococcal Polysaccharide PPV23 10/15/2009, 09/05/2014, 10/01/2014, 11/01/2016       Lennox Pap, MD  [facility] INTERNAL MEDICINE [place]

## 2022-08-02 ENCOUNTER — TELEPHONE (OUTPATIENT)
Dept: UROLOGY | Facility: CLINIC | Age: 47
End: 2022-08-02

## 2022-08-02 PROBLEM — R05.9 COUGH: Status: ACTIVE | Noted: 2022-08-02

## 2022-08-02 LAB — SL AMB POCT URINE HCG: NORMAL

## 2022-08-02 NOTE — ASSESSMENT & PLAN NOTE
· Likely MSK from persistant non-productive cough since COVID infection  Possibly post-inflammatory? Reviewed recent imaging  · See above plans

## 2022-08-02 NOTE — TELEPHONE ENCOUNTER
L/m - pt on wait list - DV would like us to double book his appts on 8/3 w/NP appts - please send to clerical if she calls back and we will call her with the available times

## 2022-08-02 NOTE — ASSESSMENT & PLAN NOTE
· Inconsistent use of bisphosphanate therapy  Check DEXA scan    · Consideration for stopping bisphosphonate therapy given risk for pill-induced esophagitis, will discuss prolia alternatively

## 2022-08-02 NOTE — ASSESSMENT & PLAN NOTE
· CTA neg for PE or pneumonia  · Trial of tessalon for cough, no relief  Will give rx for Tussionex  · Has follow-up with her transplant team will be undergoing additional testing, there are concerns possible superimposed opportunistic infection, otherwise patient is systemically well

## 2022-08-02 NOTE — ASSESSMENT & PLAN NOTE
Lab Results   Component Value Date    HGBA1C 6 3 (H) 11/08/2021   · Well controlled  Check A1c  Multifactorial, the setting of pancreatic insufficiency and chronic steroid use    · Continue ISS, dietary lifestyle modification will carbohydrate restriction  · Check microalbumin/creatinine ratio  · Referral to ophthalmology for diabetic retinopathy screening

## 2022-08-02 NOTE — ASSESSMENT & PLAN NOTE
· Continue PPI daily and H2 blocker  · Follows with GI  · Repeat endoscopy planned for further eval of severe erosive esophagitis

## 2022-08-02 NOTE — ASSESSMENT & PLAN NOTE
· Likely incidental   Patient wo CVA tenderness on exam, I suspect her left-sided rib pain is MSK in nature from chronic cough following recent covid  · Will f/u with urology for monitoring given size of stone    No obstructive features  · Encourage oral hydration

## 2022-08-02 NOTE — ASSESSMENT & PLAN NOTE
Lab Results   Component Value Date    EGFR 58 07/25/2022    EGFR 78 04/28/2022    EGFR 78 01/14/2022    CREATININE 1 15 07/25/2022    CREATININE 0 90 04/28/2022    CREATININE 0 90 01/14/2022   Stable  Avoid nephrotoxic agents    Monitor on PPI therapy  Avoid NSAIDS

## 2022-08-03 ENCOUNTER — TELEPHONE (OUTPATIENT)
Dept: ADMINISTRATIVE | Facility: OTHER | Age: 47
End: 2022-08-03

## 2022-08-03 RX ORDER — MEDROXYPROGESTERONE ACETATE 150 MG/ML
150 INJECTION, SUSPENSION INTRAMUSCULAR ONCE
Status: COMPLETED | OUTPATIENT
Start: 2022-08-03 | End: 2022-08-03

## 2022-08-03 RX ADMIN — MEDROXYPROGESTERONE ACETATE 150 MG: 150 INJECTION, SUSPENSION INTRAMUSCULAR at 08:47

## 2022-08-03 NOTE — LETTER
Diabetic Eye Exam Form    Date Requested: 22  Patient: Penny Goins  Patient : 1975   Referring Provider: Poornima Aiken MD    DIABETIC Eye Exam Date _______________________________    Type of Exam MUST be documented for Diabetic Eye Exams  Please CHECK ONE  Retinal Exam       Dilated Retinal Exam       OCT       Optomap-Iris Exam      Fundus Photography     Left Eye - Please check Retinopathy AND Type or No Retinopathy      Exam did show retinopathy    Exam did not show retinopathy         Mild     Proliferative           Moderate    Severe            None         Right Eye - Please check Retinopathy AND Type or No Retinopathy     Exam did show retinopathy    Exam did not show retinopathy         Mild     Proliferative        Moderate    Severe        None       Comments __________________________________________________________    Practice Providing Exam ______________________________________________    Exam Performed By (print name) _______________________________________      Provider Signature ___________________________________________________    These reports are needed for  compliance  Please fax this completed form and a copy of the Diabetic Eye Exam report to our office located at [place] [address] as soon as possible via [phone] damir Humphries: Phone [phone]  We thank you for your assistance in treating our mutual patient

## 2022-08-03 NOTE — TELEPHONE ENCOUNTER
----- Message from Alana Riley RN sent at 8/2/2022  2:08 PM EDT -----  Regarding: care gap request  08/02/22 2:08 PM    Hello, our patient attached above has had Diabetic Eye Exam completed/performed  Please assist in updating the patient chart by making an External outreach to [facility] located in [place]   The date of service is 2021    Thank you,  Dex Teran RN  [facility] [place]

## 2022-08-03 NOTE — LETTER
Diabetic Eye Exam Form    Date Requested: 22  Patient: Ryan Aguilar  Patient : 1975   Referring Provider: Kalin Jaramillo MD    DIABETIC Eye Exam Date _______________________________    Type of Exam MUST be documented for Diabetic Eye Exams  Please CHECK ONE  Retinal Exam       Dilated Retinal Exam       OCT       Optomap-Iris Exam      Fundus Photography     Left Eye - Please check Retinopathy AND Type or No Retinopathy      Exam did show retinopathy    Exam did not show retinopathy         Mild     Proliferative           Moderate    Severe            None         Right Eye - Please check Retinopathy AND Type or No Retinopathy     Exam did show retinopathy    Exam did not show retinopathy         Mild     Proliferative        Moderate    Severe        None       Comments __________________________________________________________    Practice Providing Exam ______________________________________________    Exam Performed By (print name) _______________________________________      Provider Signature ___________________________________________________    These reports are needed for  compliance  Please fax this completed form and a copy of the Diabetic Eye Exam report to our office located at [place] [address] as soon as possible via [phone] damir Ochoa: Phone [phone]  We thank you for your assistance in treating our mutual patient

## 2022-08-04 LAB — HBA1C MFR BLD HPLC: 8.3 %

## 2022-08-04 PROCEDURE — 3052F HG A1C>EQUAL 8.0%<EQUAL 9.0%: CPT | Performed by: PHYSICIAN ASSISTANT

## 2022-08-04 NOTE — TELEPHONE ENCOUNTER
Upon review of the In Basket request and the patient's chart, initial outreach has been made via fax, please see Contacts section for details       Thank you  Zarina Doing

## 2022-08-09 ENCOUNTER — TELEPHONE (OUTPATIENT)
Dept: NEUROLOGY | Facility: CLINIC | Age: 47
End: 2022-08-09

## 2022-08-09 NOTE — TELEPHONE ENCOUNTER
Received mail from [facility] about patient a request being submitted with incomplete or invalid information  Form has been scanned into this encounter

## 2022-08-11 ENCOUNTER — PATIENT MESSAGE (OUTPATIENT)
Dept: INTERNAL MEDICINE CLINIC | Facility: CLINIC | Age: 47
End: 2022-08-11

## 2022-08-11 DIAGNOSIS — R07.81 RIB PAIN: Primary | ICD-10-CM

## 2022-08-11 DIAGNOSIS — E84.9 CYSTIC FIBROSIS (HCC): ICD-10-CM

## 2022-08-11 RX ORDER — OXYCODONE HYDROCHLORIDE 10 MG/1
10 TABLET ORAL EVERY 8 HOURS PRN
Qty: 30 TABLET | Refills: 0 | Status: SHIPPED | OUTPATIENT
Start: 2022-08-11 | End: 2022-08-24 | Stop reason: SDUPTHER

## 2022-08-11 RX ORDER — ALPRAZOLAM 1 MG/1
1 TABLET ORAL
Qty: 30 TABLET | Refills: 0 | Status: SHIPPED | OUTPATIENT
Start: 2022-08-11 | End: 2022-10-22 | Stop reason: SDUPTHER

## 2022-08-11 NOTE — TELEPHONE ENCOUNTER
As a follow-up, a second attempt has been made for outreach via fax, please see Contacts section for details      Thank you  Madhu Styles

## 2022-08-11 NOTE — TELEPHONE ENCOUNTER
I lmom informing pt that we received Eliquis clearance from Dr Cal Parker and she may hold 2 days prior to the procedure  Will call pt again in one week if do not hear back from pt as I asked her to please call back to let me know she received my message

## 2022-08-16 ENCOUNTER — TELEPHONE (OUTPATIENT)
Dept: INTERNAL MEDICINE CLINIC | Facility: CLINIC | Age: 47
End: 2022-08-16

## 2022-08-16 NOTE — TELEPHONE ENCOUNTER
WE received result on pt blood work that was completed on 8/4 it was completed at [address] [place] [address] by the [facility] who jody this blood work  I just wanted to be sure it was reviewed   I

## 2022-08-18 NOTE — TELEPHONE ENCOUNTER
I lmom informing pt that we received clearance from Dr Galileo Easley to  hold her Eliquis (Apixaban) 2 days prior to the procedure  I asked pt to please call me back to let me know that she received my message  Will call pt again in two weeks if do not hear back from her

## 2022-08-19 ENCOUNTER — TELEPHONE (OUTPATIENT)
Dept: GASTROENTEROLOGY | Facility: AMBULARY SURGERY CENTER | Age: 47
End: 2022-08-19

## 2022-08-19 DIAGNOSIS — K21.00 GASTROESOPHAGEAL REFLUX DISEASE WITH ESOPHAGITIS WITHOUT HEMORRHAGE: ICD-10-CM

## 2022-08-19 RX ORDER — METOCLOPRAMIDE 10 MG/1
TABLET ORAL
Qty: 90 TABLET | Refills: 1 | Status: SHIPPED | OUTPATIENT
Start: 2022-08-19

## 2022-08-19 NOTE — TELEPHONE ENCOUNTER
Genet Jaimes routed conversation to You 2 hours ago (11:35 AM)     Genet Jaimes 2 hours ago (11:35 AM)     NC       Pt called back to confirm she received message to hold her Eliquis prior to procedure

## 2022-08-22 NOTE — PROGRESS NOTES
8/23/2022      Chief Complaint   Patient presents with    Nephrolithiasis         Assessment and Plan    42 y o  female new patient     1  Nephrolithiasis   - CT from last month shows nonobstructive left lower pole 1 8 cm calculus  No hydronephrosis seen  - she is asymptomatic   - due to larger size of stone reviewed options including observation versus surgical management  I did advise the due to stone being in the lower pole this would make surgical intervention more difficult  I advised absolute indications for surgery would be if stone moved to an obstructing position, flank pain, hematuria, or recurrent urinary tract infections  Will observe stone burden at this time with repeat imaging in 1 year  She is advised to contact our office sooner with any signs and symptoms of kidney stone passage  Recommend dietary modifications and proper hydration to prevent future kidney stone formation  History of Present Illness  Richard Walkervers Shakira Doherty is a 42 y o  female here for new patient evaluation of nephrolithiasis  She was seen in the ED on 07/25/2022 with acute left flank and abdominal pain  She does have prior history of kidney stones  CT completed showed a nonobstructing lower pole calculus in the left kidney measuring 1 8 cm  No ureteral calculus seen  She was seen to have some microscopic blood on urinalysis  She states she is currently asymptomatic and denies any flank pain, abdominal pain, hematuria, dysuria, fever, chills, nausea, or vomiting  She previously was seen by our practice in 2020 for obstructing right ureteral calculus requiring ureteroscopic intervention  She has not had any kidney stone episodes or issue since that time  Medical history includes type 1 diabetes, cystic fibrosis status post lung transplant, hypertension, ulcerative pancolitis, history of blood clots    Urine dip negative for blood, leukocytes, or nitrites       Review of Systems   Constitutional: Negative for chills and fever  Respiratory: Negative for shortness of breath  Cardiovascular: Negative for chest pain  Gastrointestinal: Negative for abdominal pain, nausea and vomiting  Genitourinary: Negative for difficulty urinating, dysuria, flank pain, frequency, hematuria and urgency  Neurological: Negative for dizziness  Past Medical History  Past Medical History:   Diagnosis Date    ABPA (allergic bronchopulmonary aspergillosis) ([place] [address] ) 7/8/2012    Acute kidney injury ([place] [address] ) 9/8/2017    Acute on chronic respiratory failure ([place] [address] ) 3/4/2017    Last Assessment & Plan:  Formatting of this note might be different from the original  Recurrent with clinical worsening with enlarging PTX, s/p multiple chest tubes, but new fevers 6/19 and worsening leukocytosis indicating infection as cause of 6/19 decompensation with profound hypoxia in setting of shunting through right lung and continued pneumothorax s/p 2nd chest tube placement  6/27: Right    Anaphylaxis 6/6/2022    Asthma     Chest pain 9/21/2021    Chronic pain     Chronic respiratory failure with hypoxia ([place] [address] ) 9/7/2021    COVID-19 6/3/2022    Cystic fibrosis ([place] [address] )     Diabetes mellitus (HCC)     GERD (gastroesophageal reflux disease)     HBP (high blood pressure)     HCAP (healthcare-associated pneumonia) 3/6/2020    Influenza B 2/23/2020    Kidney stone     Neutropenic fever ([place] [address] ) 9/26/2020    Pneumonia due to Pseudomonas species ([place] [address] ) 10/28/2010    Last Assessment & Plan:  Formatting of this note might be different from the original  Polymicrobial pna w/ pseudomonas, M   Abscessus, Cadida glabrata/albicans    PONV (postoperative nausea and vomiting)     Pseudomonas aeruginosa infection 9/20/2012    Transplant recipient     Lung- 2013, 2017       Past Social History  Past Surgical History:   Procedure Laterality Date    APPENDECTOMY      AUGMENTATION MAMMAPLASTY Bilateral 2004    AUGMENTATION MAMMAPLASTY Right 2021    removed     BREAST SURGERY      Aug      BRONCHOSCOPY      CHOLECYSTECTOMY      COLONOSCOPY      EGD  07/2019    Yeast Infect       FL RETROGRADE PYELOGRAM  1/9/2020    FL RETROGRADE PYELOGRAM  2/4/2020    GASTROSTOMY TUBE, PLACE      IR PORT PLACEMENT  3/22/2022    IR PORT REMOVAL  1/10/2022    IR TUNNELED CENTRAL LINE PLACEMENT  1/13/2022    LUNG SURGERY      LUNG TRANSPLANT      X2    NH CYSTO/URETERO W/LITHOTRIPSY &INDWELL STENT INSRT Right 2/4/2020    Procedure: CYSTOSCOPY URETEROSCOPY WITH LITHOTRIPSY HOLMIUM LASER, RETROGRADE PYELOGRAM AND INSERTION STENT URETERAL;  Surgeon: Nilay Cyr MD;  [facility];  Service: Urology    NH CYSTOURETHROSCOPY,URETER CATHETER Right 1/9/2020    Procedure: CYSTOSCOPY RETROGRADE PYELOGRAM WITH INSERTION STENT URETERAL;  Surgeon: Nilay Cyr MD;  [facility];  Service: Urology   [address] [place] [address]  1/17/2017     Social History     Tobacco Use   Smoking Status Former Smoker    Packs/day: 0 50    Years: 12 00    Pack years: 6 00   Smokeless Tobacco Never Used       Past Family History  Family History   Problem Relation Age of Onset    Alcohol abuse Mother     Mental illness Mother     Alcohol abuse Father     COPD Maternal Grandmother     Lung cancer Maternal Grandmother 72    COPD Maternal Grandfather     COPD Paternal Grandmother     Lung cancer Paternal Grandmother 72    No Known Problems Sister     No Known Problems Paternal Aunt     Breast cancer Neg Hx     Ovarian cancer Neg Hx     Colon cancer Neg Hx        Past Social history  Social History     Socioeconomic History    Marital status: /Civil Union     Spouse name: Not on file    Number of children: Not on file    Years of education: 15    Highest education level: Not on file   Occupational History    Occupation: Disabled    Tobacco Use    Smoking status: Former Smoker     Packs/day: 0 50     Years: 12 00     Pack years: 6 00    Smokeless tobacco: Never Used   Vaping Use    Vaping Use: Never used   Substance and Sexual Activity    Alcohol use: Yes     Comment: social    Drug use: Yes     Frequency: 2 0 times per week     Types: Marijuana     Comment: medical edible marijuana prescribed    Sexual activity: Yes     Partners: Male     Birth control/protection: None, Injection   Other Topics Concern    Not on file   Social History Narrative    Most recent tobacco use screenin2019    Do you currently or have you served in the [employer]: No    Were you activated, into active duty, as a member of the [employer] or as a Reservist: No    Occupation: disabled    Education: 12    Marital status:     Exercise level: Occasional    Diet: Specific    low sugar, high fat    General stress level: High    Alcohol intake: Occasional    Caffeine intake: Heavy    Chewing tobacco: none    Illicit drugs: none    Guns present in home: No    Seat belts used routinely: Yes    Smoke alarm in home: Yes    Advance directive: Yes     Social Determinants of Health     Financial Resource Strain: Not on file   Food Insecurity: No Food Insecurity    Worried About Running Out of Food in the Last Year: Never true    April of Food in the Last Year: Never true   Transportation Needs: No Transportation Needs    Lack of Transportation (Medical): No    Lack of Transportation (Non-Medical):  No   Physical Activity: Not on file   Stress: Not on file   Social Connections: Not on file   Intimate Partner Violence: Not on file   Housing Stability: Low Risk     Unable to Pay for Housing in the Last Year: No    Number of Places Lived in the Last Year: 1    Unstable Housing in the Last Year: No       Current Medications  Current Outpatient Medications   Medication Sig Dispense Refill    acetaminophen (TYLENOL) 325 mg tablet Take 3 tablets (975 mg total) by mouth every 8 (eight) hours  0    albuterol (2 5 mg/3 mL) 0 083 % nebulizer solution Take 6 mL (5 mg total) by nebulization 2 (two) times a day  0    ALPRAZolam (XANAX) 1 mg tablet Take 1 tablet (1 mg total) by mouth daily at bedtime as needed for anxiety or sleep 30 tablet 0    apixaban (ELIQUIS) 5 mg Take 1 tablet (5 mg total) by mouth 2 (two) times a day  0    atovaquone (MEPRON) 750 mg/5 mL suspension Take 1,500 mg by mouth daily      cyclobenzaprine (FLEXERIL) 5 mg tablet Take 1-2 tabs every 8 hours as needed for muscle pain/spasm 90 tablet 0    dexlansoprazole (DEXILANT) 60 MG capsule Take 1 capsule (60 mg total) by mouth in the morning  30 capsule 0    dronabinol (MARINOL) 5 MG capsule TAKE 1 CAPSULE (5 MG TOTAL) BY MOUTH 3 (THREE) TIMES A DAY (Patient taking differently: Take 5 mg by mouth 3 (three) times a day as needed) 90 capsule 3    famotidine (PEPCID) 40 MG tablet TAKE 1 TABLET BY MOUTH EVERYDAY AT BEDTIME 90 tablet 1    insulin lispro (HumaLOG) 100 units/mL injection Inject 4 Units under the skin 3 (three) times a day with meals (Patient taking differently: Inject under the skin 3 (three) times a day with meals Sliding scale)  0    [START ON 11/1/2022] medroxyPROGESTERone (DEPO-PROVERA) 150 mg/mL injection Inject 1 mL (150 mg total) into a muscle every 3 (three) months 1 mL 3    metoclopramide (REGLAN) 10 mg tablet TAKE 1 TABLET BY MOUTH 3 TIMES A DAY WITH MEALS   90 tablet 1    naloxone (NARCAN) 4 mg/0 1 mL nasal spray 4 mg into each nostril      oxyCODONE (ROXICODONE) 10 MG TABS Take 1 tablet (10 mg total) by mouth every 8 (eight) hours as needed for severe pain Max Daily Amount: 30 mg 30 tablet 0    pancrelipase, Lip-Prot-Amyl, (CREON) 24,000 units Take 48,000 Units by mouth 3 (three) times a day with meals      predniSONE 10 mg tablet Take 10 mg by mouth daily      pregabalin (Lyrica) 100 mg capsule Take 1 capsule (100 mg total) by mouth 2 (two) times a day 60 capsule 0    Respiratory Therapy Supplies (Nebulizer) SUKHI Please dispense nebulizer machine      sucralfate (CARAFATE) 1 g/10 mL suspension Take 10 mL (1 g total) by mouth 4 (four) times a day (with meals and at bedtime) 420 mL 1    tacrolimus (PROGRAF) 1 mg capsule Take 2 mg by mouth 2 (two) times a day      traZODone (DESYREL) 50 mg tablet Take 0 5 tablets (25 mg total) by mouth daily at bedtime (Patient taking differently: Take 25 mg by mouth daily at bedtime as needed)  0    valACYclovir (VALTREX) 500 mg tablet Take 500 mg by mouth daily      hydrocodone-chlorpheniramine polistirex (TUSSIONEX) 10-8 mg/5 mL ER suspension Take 5 mL by mouth every 12 (twelve) hours as needed for cough Max Daily Amount: 10 mL (Patient not taking: Reported on 8/23/2022) 120 mL 0    nystatin (MYCOSTATIN) 500,000 units/5 mL suspension Apply 5 mL (500,000 Units total) to the mouth or throat 4 (four) times a day (Patient not taking: Reported on 8/23/2022) 180 mL 0     No current facility-administered medications for this visit  Allergies  Allergies   Allergen Reactions    Bebtelovimab Anaphylaxis    Vancomycin Angioedema and Hives    Gabapentin Hallucinations    Ceftazidime Headache     Severe headaches after desensitization    Nsaids Other (See Comments)     Lung transplant increases risk of renal toxicity, call lung txp provider to discuss if needed         The following portions of the patient's history were reviewed and updated as appropriate: allergies, current medications, past medical history, past social history, past surgical history and problem list       Vitals  Vitals:    08/23/22 0937   BP: 130/82   Pulse: 92   SpO2: 99%   Weight: 50 8 kg (112 lb)   Height: 5' 2" (1 575 m)           Physical Exam  Physical Exam  Constitutional:       Appearance: Normal appearance  HENT:      Head: Normocephalic and atraumatic  Right Ear: External ear normal       Left Ear: External ear normal    Eyes:      General: No scleral icterus  Conjunctiva/sclera: Conjunctivae normal    Cardiovascular:      Pulses: Normal pulses     Pulmonary: Effort: Pulmonary effort is normal    Musculoskeletal:         General: Normal range of motion  Cervical back: Normal range of motion  Neurological:      General: No focal deficit present  Mental Status: She is alert and oriented to person, place, and time  Psychiatric:         Mood and Affect: Mood normal          Behavior: Behavior normal          Thought Content:  Thought content normal          Judgment: Judgment normal            Results  Recent Results (from the past 1 hour(s))   POCT urine dip    Collection Time: 08/23/22  9:41 AM   Result Value Ref Range    LEUKOCYTE ESTERASE,UA -     NITRITE,UA -     SL AMB POCT UROBILINOGEN 0 2     POCT URINE PROTEIN -      PH,UA 6 5     BLOOD,UA -     SPECIFIC GRAVITY,UA 1 015     KETONES,UA -     BILIRUBIN,UA -     GLUCOSE, UA 1/2      COLOR,UA yellow     CLARITY,UA clear    ]  No results found for: PSA  Lab Results   Component Value Date    CALCIUM 8 5 07/25/2022    K 4 6 07/25/2022    CO2 21 07/25/2022     07/25/2022    BUN 26 (H) 07/25/2022    CREATININE 1 15 07/25/2022     Lab Results   Component Value Date    WBC 7 11 07/25/2022    HGB 11 6 07/25/2022    HCT 37 6 07/25/2022    MCV 92 07/25/2022     07/25/2022           Orders  Orders Placed This Encounter   Procedures    POCT urine dip       Swetha Messina PA-C

## 2022-08-22 NOTE — TELEPHONE ENCOUNTER
Upon review of the In Basket request we were able to locate, review, and update the patient chart as requested for Diabetic Eye Exam     Any additional questions or concerns should be emailed to the Practice Liaisons via [email or web address] email, please do not reply via In Basket      Thank you  Grace Santoro

## 2022-08-23 ENCOUNTER — OFFICE VISIT (OUTPATIENT)
Dept: UROLOGY | Facility: CLINIC | Age: 47
End: 2022-08-23
Payer: COMMERCIAL

## 2022-08-23 VITALS
HEIGHT: 62 IN | BODY MASS INDEX: 20.61 KG/M2 | SYSTOLIC BLOOD PRESSURE: 130 MMHG | WEIGHT: 112 LBS | DIASTOLIC BLOOD PRESSURE: 82 MMHG | HEART RATE: 92 BPM | OXYGEN SATURATION: 99 %

## 2022-08-23 DIAGNOSIS — N20.0 NEPHROLITHIASIS: Primary | ICD-10-CM

## 2022-08-23 LAB
SL AMB  POCT GLUCOSE, UA: NORMAL
SL AMB LEUKOCYTE ESTERASE,UA: NORMAL
SL AMB POCT BILIRUBIN,UA: NORMAL
SL AMB POCT BLOOD,UA: NORMAL
SL AMB POCT CLARITY,UA: CLEAR
SL AMB POCT COLOR,UA: YELLOW
SL AMB POCT KETONES,UA: NORMAL
SL AMB POCT NITRITE,UA: NORMAL
SL AMB POCT PH,UA: 6.5
SL AMB POCT SPECIFIC GRAVITY,UA: 1.01
SL AMB POCT URINE PROTEIN: NORMAL
SL AMB POCT UROBILINOGEN: 0.2

## 2022-08-23 PROCEDURE — 99203 OFFICE O/P NEW LOW 30 MIN: CPT | Performed by: PHYSICIAN ASSISTANT

## 2022-08-23 PROCEDURE — 81002 URINALYSIS NONAUTO W/O SCOPE: CPT | Performed by: PHYSICIAN ASSISTANT

## 2022-08-23 PROCEDURE — 3061F NEG MICROALBUMINURIA REV: CPT | Performed by: PHYSICIAN ASSISTANT

## 2022-08-24 ENCOUNTER — TELEPHONE (OUTPATIENT)
Dept: INTERNAL MEDICINE CLINIC | Facility: CLINIC | Age: 47
End: 2022-08-24

## 2022-08-24 DIAGNOSIS — R07.81 RIB PAIN: ICD-10-CM

## 2022-08-24 RX ORDER — OXYCODONE HYDROCHLORIDE 10 MG/1
10 TABLET ORAL EVERY 8 HOURS PRN
Qty: 21 TABLET | Refills: 0 | Status: SHIPPED | OUTPATIENT
Start: 2022-08-24 | End: 2022-08-31

## 2022-08-24 NOTE — TELEPHONE ENCOUNTER
[facility] has called the [facility] office in regards to [address] medications  Staff stated a prior authorization is required for the oxyCODONE (ROXICODONE) 10 mg tablet  Thanked [address] for the information and ended the call  Will start prior authorization

## 2022-08-24 NOTE — TELEPHONE ENCOUNTER
Completed prior authorization for [facility] oxyCODONE (ROXICODONE) 10 MG tablet   Sent to insurance for approval    Key: [id number]

## 2022-08-26 DIAGNOSIS — G89.4 CHRONIC PAIN SYNDROME: ICD-10-CM

## 2022-08-26 DIAGNOSIS — M54.16 LUMBAR RADICULOPATHY: ICD-10-CM

## 2022-08-26 DIAGNOSIS — M79.671 RIGHT FOOT PAIN: ICD-10-CM

## 2022-08-26 RX ORDER — PREGABALIN 100 MG/1
100 CAPSULE ORAL 2 TIMES DAILY
Qty: 60 CAPSULE | Refills: 0 | Status: SHIPPED | OUTPATIENT
Start: 2022-08-26 | End: 2022-10-26 | Stop reason: SDUPTHER

## 2022-09-12 ENCOUNTER — APPOINTMENT (EMERGENCY)
Dept: CT IMAGING | Facility: HOSPITAL | Age: 47
DRG: 205 | End: 2022-09-12
Payer: COMMERCIAL

## 2022-09-12 ENCOUNTER — APPOINTMENT (OUTPATIENT)
Dept: RADIOLOGY | Facility: HOSPITAL | Age: 47
DRG: 205 | End: 2022-09-12
Payer: COMMERCIAL

## 2022-09-12 ENCOUNTER — HOSPITAL ENCOUNTER (INPATIENT)
Facility: HOSPITAL | Age: 47
LOS: 2 days | Discharge: ANOTHER HOSPITAL | DRG: 205 | End: 2022-09-14
Attending: EMERGENCY MEDICINE | Admitting: FAMILY MEDICINE
Payer: COMMERCIAL

## 2022-09-12 DIAGNOSIS — J18.9 PNEUMONIA: Primary | ICD-10-CM

## 2022-09-12 DIAGNOSIS — N17.9 AKI (ACUTE KIDNEY INJURY) (HCC): ICD-10-CM

## 2022-09-12 PROBLEM — J18.0 BRONCHOPNEUMONIA: Status: ACTIVE | Noted: 2022-09-12

## 2022-09-12 LAB
2HR DELTA HS TROPONIN: -1 NG/L
4HR DELTA HS TROPONIN: 2 NG/L
ALBUMIN SERPL BCP-MCNC: 2.9 G/DL (ref 3.5–5)
ALP SERPL-CCNC: 73 U/L (ref 46–116)
ALT SERPL W P-5'-P-CCNC: 38 U/L (ref 12–78)
ANION GAP SERPL CALCULATED.3IONS-SCNC: 15 MMOL/L (ref 4–13)
APTT PPP: 24 SECONDS (ref 23–37)
AST SERPL W P-5'-P-CCNC: 11 U/L (ref 5–45)
BASOPHILS # BLD AUTO: 0.01 THOUSANDS/ΜL (ref 0–0.1)
BASOPHILS NFR BLD AUTO: 0 % (ref 0–1)
BILIRUB SERPL-MCNC: 0.28 MG/DL (ref 0.2–1)
BUN SERPL-MCNC: 31 MG/DL (ref 5–25)
CALCIUM ALBUM COR SERPL-MCNC: 9.8 MG/DL (ref 8.3–10.1)
CALCIUM SERPL-MCNC: 8.9 MG/DL (ref 8.3–10.1)
CARDIAC TROPONIN I PNL SERPL HS: 12 NG/L
CARDIAC TROPONIN I PNL SERPL HS: 13 NG/L
CARDIAC TROPONIN I PNL SERPL HS: 15 NG/L
CHLORIDE SERPL-SCNC: 103 MMOL/L (ref 96–108)
CO2 SERPL-SCNC: 20 MMOL/L (ref 21–32)
CREAT SERPL-MCNC: 1.67 MG/DL (ref 0.6–1.3)
D DIMER PPP FEU-MCNC: 1.77 UG/ML FEU
EOSINOPHIL # BLD AUTO: 0.01 THOUSAND/ΜL (ref 0–0.61)
EOSINOPHIL NFR BLD AUTO: 0 % (ref 0–6)
ERYTHROCYTE [DISTWIDTH] IN BLOOD BY AUTOMATED COUNT: 15.9 % (ref 11.6–15.1)
FLUAV RNA RESP QL NAA+PROBE: NEGATIVE
FLUBV RNA RESP QL NAA+PROBE: NEGATIVE
GFR SERPL CREATININE-BSD FRML MDRD: 36 ML/MIN/1.73SQ M
GLUCOSE SERPL-MCNC: 167 MG/DL (ref 65–140)
GLUCOSE SERPL-MCNC: 410 MG/DL (ref 65–140)
HCT VFR BLD AUTO: 34.1 % (ref 34.8–46.1)
HGB BLD-MCNC: 10.5 G/DL (ref 11.5–15.4)
IMM GRANULOCYTES # BLD AUTO: 0.02 THOUSAND/UL (ref 0–0.2)
IMM GRANULOCYTES NFR BLD AUTO: 0 % (ref 0–2)
INR PPP: 0.97 (ref 0.84–1.19)
LACTATE SERPL-SCNC: 3.5 MMOL/L (ref 0.5–2)
LACTATE SERPL-SCNC: 3.8 MMOL/L (ref 0.5–2)
LACTATE SERPL-SCNC: 5.3 MMOL/L (ref 0.5–2)
LYMPHOCYTES # BLD AUTO: 1.2 THOUSANDS/ΜL (ref 0.6–4.47)
LYMPHOCYTES NFR BLD AUTO: 24 % (ref 14–44)
MCH RBC QN AUTO: 27.9 PG (ref 26.8–34.3)
MCHC RBC AUTO-ENTMCNC: 30.8 G/DL (ref 31.4–37.4)
MCV RBC AUTO: 91 FL (ref 82–98)
MONOCYTES # BLD AUTO: 0.15 THOUSAND/ΜL (ref 0.17–1.22)
MONOCYTES NFR BLD AUTO: 3 % (ref 4–12)
NEUTROPHILS # BLD AUTO: 3.56 THOUSANDS/ΜL (ref 1.85–7.62)
NEUTS SEG NFR BLD AUTO: 73 % (ref 43–75)
NRBC BLD AUTO-RTO: 0 /100 WBCS
PLATELET # BLD AUTO: 80 THOUSANDS/UL (ref 149–390)
PMV BLD AUTO: 10.6 FL (ref 8.9–12.7)
POTASSIUM SERPL-SCNC: 5 MMOL/L (ref 3.5–5.3)
PROCALCITONIN SERPL-MCNC: 0.49 NG/ML
PROCALCITONIN SERPL-MCNC: 0.59 NG/ML
PROT SERPL-MCNC: 7.6 G/DL (ref 6.4–8.4)
PROTHROMBIN TIME: 12.7 SECONDS (ref 11.6–14.5)
RBC # BLD AUTO: 3.77 MILLION/UL (ref 3.81–5.12)
RSV RNA RESP QL NAA+PROBE: NEGATIVE
SARS-COV-2 RNA RESP QL NAA+PROBE: NEGATIVE
SODIUM SERPL-SCNC: 138 MMOL/L (ref 135–147)
WBC # BLD AUTO: 4.95 THOUSAND/UL (ref 4.31–10.16)

## 2022-09-12 PROCEDURE — 71275 CT ANGIOGRAPHY CHEST: CPT

## 2022-09-12 PROCEDURE — 82948 REAGENT STRIP/BLOOD GLUCOSE: CPT

## 2022-09-12 PROCEDURE — 85025 COMPLETE CBC W/AUTO DIFF WBC: CPT | Performed by: EMERGENCY MEDICINE

## 2022-09-12 PROCEDURE — 85730 THROMBOPLASTIN TIME PARTIAL: CPT | Performed by: EMERGENCY MEDICINE

## 2022-09-12 PROCEDURE — 96374 THER/PROPH/DIAG INJ IV PUSH: CPT

## 2022-09-12 PROCEDURE — 84145 PROCALCITONIN (PCT): CPT | Performed by: INTERNAL MEDICINE

## 2022-09-12 PROCEDURE — 83605 ASSAY OF LACTIC ACID: CPT | Performed by: INTERNAL MEDICINE

## 2022-09-12 PROCEDURE — 96376 TX/PRO/DX INJ SAME DRUG ADON: CPT

## 2022-09-12 PROCEDURE — 83605 ASSAY OF LACTIC ACID: CPT | Performed by: EMERGENCY MEDICINE

## 2022-09-12 PROCEDURE — 84484 ASSAY OF TROPONIN QUANT: CPT | Performed by: EMERGENCY MEDICINE

## 2022-09-12 PROCEDURE — 85379 FIBRIN DEGRADATION QUANT: CPT | Performed by: EMERGENCY MEDICINE

## 2022-09-12 PROCEDURE — 94640 AIRWAY INHALATION TREATMENT: CPT

## 2022-09-12 PROCEDURE — 0241U HB NFCT DS VIR RESP RNA 4 TRGT: CPT | Performed by: EMERGENCY MEDICINE

## 2022-09-12 PROCEDURE — 36415 COLL VENOUS BLD VENIPUNCTURE: CPT | Performed by: EMERGENCY MEDICINE

## 2022-09-12 PROCEDURE — 80053 COMPREHEN METABOLIC PANEL: CPT | Performed by: EMERGENCY MEDICINE

## 2022-09-12 PROCEDURE — 96361 HYDRATE IV INFUSION ADD-ON: CPT

## 2022-09-12 PROCEDURE — 3066F NEPHROPATHY DOC TX: CPT | Performed by: INTERNAL MEDICINE

## 2022-09-12 PROCEDURE — 99285 EMERGENCY DEPT VISIT HI MDM: CPT

## 2022-09-12 PROCEDURE — 71045 X-RAY EXAM CHEST 1 VIEW: CPT

## 2022-09-12 PROCEDURE — 85610 PROTHROMBIN TIME: CPT | Performed by: EMERGENCY MEDICINE

## 2022-09-12 PROCEDURE — 84145 PROCALCITONIN (PCT): CPT | Performed by: EMERGENCY MEDICINE

## 2022-09-12 PROCEDURE — 87040 BLOOD CULTURE FOR BACTERIA: CPT | Performed by: EMERGENCY MEDICINE

## 2022-09-12 PROCEDURE — 99223 1ST HOSP IP/OBS HIGH 75: CPT | Performed by: INTERNAL MEDICINE

## 2022-09-12 PROCEDURE — 99285 EMERGENCY DEPT VISIT HI MDM: CPT | Performed by: EMERGENCY MEDICINE

## 2022-09-12 RX ORDER — LINEZOLID 2 MG/ML
600 INJECTION, SOLUTION INTRAVENOUS ONCE
Status: COMPLETED | OUTPATIENT
Start: 2022-09-12 | End: 2022-09-13

## 2022-09-12 RX ORDER — SODIUM CHLORIDE 9 MG/ML
125 INJECTION, SOLUTION INTRAVENOUS CONTINUOUS
Status: DISCONTINUED | OUTPATIENT
Start: 2022-09-12 | End: 2022-09-14 | Stop reason: HOSPADM

## 2022-09-12 RX ORDER — LINEZOLID 2 MG/ML
600 INJECTION, SOLUTION INTRAVENOUS EVERY 12 HOURS
Status: DISCONTINUED | OUTPATIENT
Start: 2022-09-12 | End: 2022-09-13

## 2022-09-12 RX ORDER — HYDROMORPHONE HCL/PF 1 MG/ML
1 SYRINGE (ML) INJECTION EVERY 4 HOURS PRN
Status: DISCONTINUED | OUTPATIENT
Start: 2022-09-12 | End: 2022-09-14 | Stop reason: HOSPADM

## 2022-09-12 RX ORDER — OXYCODONE HYDROCHLORIDE 10 MG/1
10 TABLET ORAL EVERY 4 HOURS PRN
Status: DISCONTINUED | OUTPATIENT
Start: 2022-09-12 | End: 2022-09-14 | Stop reason: HOSPADM

## 2022-09-12 RX ORDER — INSULIN LISPRO 100 [IU]/ML
1-6 INJECTION, SOLUTION INTRAVENOUS; SUBCUTANEOUS
Status: DISCONTINUED | OUTPATIENT
Start: 2022-09-13 | End: 2022-09-14 | Stop reason: HOSPADM

## 2022-09-12 RX ORDER — PANTOPRAZOLE SODIUM 40 MG/1
40 TABLET, DELAYED RELEASE ORAL
Status: DISCONTINUED | OUTPATIENT
Start: 2022-09-13 | End: 2022-09-14 | Stop reason: HOSPADM

## 2022-09-12 RX ORDER — FENTANYL CITRATE 50 UG/ML
75 INJECTION, SOLUTION INTRAMUSCULAR; INTRAVENOUS ONCE
Status: COMPLETED | OUTPATIENT
Start: 2022-09-12 | End: 2022-09-12

## 2022-09-12 RX ORDER — ALBUTEROL SULFATE 2.5 MG/3ML
5 SOLUTION RESPIRATORY (INHALATION) ONCE
Status: COMPLETED | OUTPATIENT
Start: 2022-09-12 | End: 2022-09-12

## 2022-09-12 RX ORDER — PREGABALIN 100 MG/1
100 CAPSULE ORAL 2 TIMES DAILY
Status: DISCONTINUED | OUTPATIENT
Start: 2022-09-12 | End: 2022-09-14 | Stop reason: HOSPADM

## 2022-09-12 RX ORDER — INSULIN LISPRO 100 [IU]/ML
1-5 INJECTION, SOLUTION INTRAVENOUS; SUBCUTANEOUS
Status: DISCONTINUED | OUTPATIENT
Start: 2022-09-12 | End: 2022-09-14 | Stop reason: HOSPADM

## 2022-09-12 RX ORDER — PREDNISONE 10 MG/1
10 TABLET ORAL DAILY
Status: DISCONTINUED | OUTPATIENT
Start: 2022-09-13 | End: 2022-09-14 | Stop reason: HOSPADM

## 2022-09-12 RX ORDER — OXYCODONE HYDROCHLORIDE 5 MG/1
5 TABLET ORAL EVERY 4 HOURS PRN
Status: DISCONTINUED | OUTPATIENT
Start: 2022-09-12 | End: 2022-09-14 | Stop reason: HOSPADM

## 2022-09-12 RX ORDER — ACETAMINOPHEN 325 MG/1
650 TABLET ORAL EVERY 6 HOURS PRN
Status: DISCONTINUED | OUTPATIENT
Start: 2022-09-12 | End: 2022-09-12

## 2022-09-12 RX ORDER — BENZONATATE 100 MG/1
100 CAPSULE ORAL 3 TIMES DAILY PRN
Status: DISCONTINUED | OUTPATIENT
Start: 2022-09-12 | End: 2022-09-14 | Stop reason: HOSPADM

## 2022-09-12 RX ORDER — ACETAMINOPHEN 325 MG/1
650 TABLET ORAL EVERY 6 HOURS PRN
Status: DISCONTINUED | OUTPATIENT
Start: 2022-09-12 | End: 2022-09-14 | Stop reason: HOSPADM

## 2022-09-12 RX ORDER — GUAIFENESIN/DEXTROMETHORPHAN 100-10MG/5
10 SYRUP ORAL EVERY 4 HOURS PRN
Status: DISCONTINUED | OUTPATIENT
Start: 2022-09-12 | End: 2022-09-12

## 2022-09-12 RX ORDER — VALACYCLOVIR HYDROCHLORIDE 500 MG/1
500 TABLET, FILM COATED ORAL DAILY
Status: DISCONTINUED | OUTPATIENT
Start: 2022-09-13 | End: 2022-09-14 | Stop reason: HOSPADM

## 2022-09-12 RX ADMIN — PANCRELIPASE 48000 UNITS: 24000; 76000; 120000 CAPSULE, DELAYED RELEASE PELLETS ORAL at 23:05

## 2022-09-12 RX ADMIN — INSULIN LISPRO 1 UNITS: 100 INJECTION, SOLUTION INTRAVENOUS; SUBCUTANEOUS at 23:04

## 2022-09-12 RX ADMIN — SODIUM CHLORIDE 125 ML/HR: 0.9 INJECTION, SOLUTION INTRAVENOUS at 22:48

## 2022-09-12 RX ADMIN — IPRATROPIUM BROMIDE 0.5 MG: 0.5 SOLUTION RESPIRATORY (INHALATION) at 18:15

## 2022-09-12 RX ADMIN — IOHEXOL 85 ML: 350 INJECTION, SOLUTION INTRAVENOUS at 19:21

## 2022-09-12 RX ADMIN — FENTANYL CITRATE 75 MCG: 50 INJECTION, SOLUTION INTRAMUSCULAR; INTRAVENOUS at 18:31

## 2022-09-12 RX ADMIN — LINEZOLID 600 MG: 600 INJECTION, SOLUTION INTRAVENOUS at 22:19

## 2022-09-12 RX ADMIN — FENTANYL CITRATE 75 MCG: 50 INJECTION, SOLUTION INTRAMUSCULAR; INTRAVENOUS at 20:17

## 2022-09-12 RX ADMIN — ALBUTEROL SULFATE 5 MG: 2.5 SOLUTION RESPIRATORY (INHALATION) at 18:15

## 2022-09-12 RX ADMIN — SODIUM CHLORIDE 1000 ML: 0.9 INJECTION, SOLUTION INTRAVENOUS at 22:13

## 2022-09-12 RX ADMIN — PREGABALIN 100 MG: 100 CAPSULE ORAL at 22:48

## 2022-09-12 RX ADMIN — HYDROMORPHONE HYDROCHLORIDE 1 MG: 1 INJECTION, SOLUTION INTRAMUSCULAR; INTRAVENOUS; SUBCUTANEOUS at 21:50

## 2022-09-12 RX ADMIN — PIPERACILLIN AND TAZOBACTAM 3.38 G: 36; 4.5 INJECTION, POWDER, FOR SOLUTION INTRAVENOUS at 21:29

## 2022-09-12 RX ADMIN — SODIUM CHLORIDE 1000 ML: 0.9 INJECTION, SOLUTION INTRAVENOUS at 19:35

## 2022-09-13 ENCOUNTER — TELEPHONE (OUTPATIENT)
Dept: GASTROENTEROLOGY | Facility: CLINIC | Age: 47
End: 2022-09-13

## 2022-09-13 VITALS
OXYGEN SATURATION: 94 % | RESPIRATION RATE: 18 BRPM | SYSTOLIC BLOOD PRESSURE: 132 MMHG | HEIGHT: 63 IN | BODY MASS INDEX: 19.61 KG/M2 | WEIGHT: 110.67 LBS | HEART RATE: 84 BPM | DIASTOLIC BLOOD PRESSURE: 78 MMHG | TEMPERATURE: 98.1 F

## 2022-09-13 LAB
ANION GAP SERPL CALCULATED.3IONS-SCNC: 12 MMOL/L (ref 4–13)
B PARAP IS1001 DNA NPH QL NAA+NON-PROBE: NOT DETECTED
B PERT.PT PRMT NPH QL NAA+NON-PROBE: NOT DETECTED
BUN SERPL-MCNC: 22 MG/DL (ref 5–25)
C PNEUM DNA NPH QL NAA+NON-PROBE: NOT DETECTED
CALCIUM SERPL-MCNC: 7.9 MG/DL (ref 8.3–10.1)
CHLORIDE SERPL-SCNC: 108 MMOL/L (ref 96–108)
CO2 SERPL-SCNC: 21 MMOL/L (ref 21–32)
CREAT SERPL-MCNC: 1.18 MG/DL (ref 0.6–1.3)
ERYTHROCYTE [DISTWIDTH] IN BLOOD BY AUTOMATED COUNT: 16.1 % (ref 11.6–15.1)
FLUAV RNA NPH QL NAA+NON-PROBE: NOT DETECTED
FLUBV RNA NPH QL NAA+NON-PROBE: NOT DETECTED
GFR SERPL CREATININE-BSD FRML MDRD: 56 ML/MIN/1.73SQ M
GLUCOSE SERPL-MCNC: 169 MG/DL (ref 65–140)
GLUCOSE SERPL-MCNC: 176 MG/DL (ref 65–140)
GLUCOSE SERPL-MCNC: 193 MG/DL (ref 65–140)
GLUCOSE SERPL-MCNC: 220 MG/DL (ref 65–140)
HADV DNA NPH QL NAA+NON-PROBE: NOT DETECTED
HCOV 229E RNA NPH QL NAA+NON-PROBE: NOT DETECTED
HCOV HKU1 RNA NPH QL NAA+NON-PROBE: NOT DETECTED
HCOV NL63 RNA NPH QL NAA+NON-PROBE: NOT DETECTED
HCOV OC43 RNA NPH QL NAA+NON-PROBE: NOT DETECTED
HCT VFR BLD AUTO: 27.1 % (ref 34.8–46.1)
HGB BLD-MCNC: 8.2 G/DL (ref 11.5–15.4)
HMPV RNA NPH QL NAA+NON-PROBE: NOT DETECTED
HPIV1 RNA NPH QL NAA+NON-PROBE: NOT DETECTED
HPIV2 RNA NPH QL NAA+NON-PROBE: NOT DETECTED
HPIV3 RNA NPH QL NAA+NON-PROBE: NOT DETECTED
HPIV4 RNA NPH QL NAA+NON-PROBE: NOT DETECTED
LACTATE SERPL-SCNC: 2.8 MMOL/L (ref 0.5–2)
LACTATE SERPL-SCNC: 2.9 MMOL/L (ref 0.5–2)
LACTATE SERPL-SCNC: 3 MMOL/L (ref 0.5–2)
M PNEUMO DNA NPH QL NAA+NON-PROBE: NOT DETECTED
MCH RBC QN AUTO: 27.7 PG (ref 26.8–34.3)
MCHC RBC AUTO-ENTMCNC: 30.3 G/DL (ref 31.4–37.4)
MCV RBC AUTO: 92 FL (ref 82–98)
PLATELET # BLD AUTO: 63 THOUSANDS/UL (ref 149–390)
PMV BLD AUTO: 11.3 FL (ref 8.9–12.7)
POTASSIUM SERPL-SCNC: 4.6 MMOL/L (ref 3.5–5.3)
PROCALCITONIN SERPL-MCNC: 0.39 NG/ML
RBC # BLD AUTO: 2.96 MILLION/UL (ref 3.81–5.12)
RSV RNA NPH QL NAA+NON-PROBE: NOT DETECTED
RV+EV RNA NPH QL NAA+NON-PROBE: NOT DETECTED
SARS-COV-2 RNA NPH QL NAA+NON-PROBE: NOT DETECTED
SODIUM SERPL-SCNC: 141 MMOL/L (ref 135–147)
TACROLIMUS BLD-MCNC: 7.3 NG/ML (ref 2–20)
WBC # BLD AUTO: 3.61 THOUSAND/UL (ref 4.31–10.16)

## 2022-09-13 PROCEDURE — 84145 PROCALCITONIN (PCT): CPT | Performed by: INTERNAL MEDICINE

## 2022-09-13 PROCEDURE — 82948 REAGENT STRIP/BLOOD GLUCOSE: CPT

## 2022-09-13 PROCEDURE — 99255 IP/OBS CONSLTJ NEW/EST HI 80: CPT | Performed by: STUDENT IN AN ORGANIZED HEALTH CARE EDUCATION/TRAINING PROGRAM

## 2022-09-13 PROCEDURE — 80048 BASIC METABOLIC PNL TOTAL CA: CPT | Performed by: INTERNAL MEDICINE

## 2022-09-13 PROCEDURE — 99232 SBSQ HOSP IP/OBS MODERATE 35: CPT | Performed by: INTERNAL MEDICINE

## 2022-09-13 PROCEDURE — 87186 SC STD MICRODIL/AGAR DIL: CPT | Performed by: STUDENT IN AN ORGANIZED HEALTH CARE EDUCATION/TRAINING PROGRAM

## 2022-09-13 PROCEDURE — 87070 CULTURE OTHR SPECIMN AEROBIC: CPT | Performed by: STUDENT IN AN ORGANIZED HEALTH CARE EDUCATION/TRAINING PROGRAM

## 2022-09-13 PROCEDURE — 80197 ASSAY OF TACROLIMUS: CPT | Performed by: STUDENT IN AN ORGANIZED HEALTH CARE EDUCATION/TRAINING PROGRAM

## 2022-09-13 PROCEDURE — 0202U NFCT DS 22 TRGT SARS-COV-2: CPT | Performed by: STUDENT IN AN ORGANIZED HEALTH CARE EDUCATION/TRAINING PROGRAM

## 2022-09-13 PROCEDURE — 87077 CULTURE AEROBIC IDENTIFY: CPT | Performed by: STUDENT IN AN ORGANIZED HEALTH CARE EDUCATION/TRAINING PROGRAM

## 2022-09-13 PROCEDURE — 83605 ASSAY OF LACTIC ACID: CPT | Performed by: INTERNAL MEDICINE

## 2022-09-13 PROCEDURE — 87205 SMEAR GRAM STAIN: CPT | Performed by: STUDENT IN AN ORGANIZED HEALTH CARE EDUCATION/TRAINING PROGRAM

## 2022-09-13 PROCEDURE — 87081 CULTURE SCREEN ONLY: CPT | Performed by: STUDENT IN AN ORGANIZED HEALTH CARE EDUCATION/TRAINING PROGRAM

## 2022-09-13 PROCEDURE — 85027 COMPLETE CBC AUTOMATED: CPT | Performed by: INTERNAL MEDICINE

## 2022-09-13 PROCEDURE — 99223 1ST HOSP IP/OBS HIGH 75: CPT | Performed by: INTERNAL MEDICINE

## 2022-09-13 RX ORDER — ALBUTEROL SULFATE 2.5 MG/3ML
2.5 SOLUTION RESPIRATORY (INHALATION)
Status: DISCONTINUED | OUTPATIENT
Start: 2022-09-13 | End: 2022-09-14 | Stop reason: HOSPADM

## 2022-09-13 RX ORDER — ALPRAZOLAM 0.5 MG/1
0.5 TABLET ORAL
Status: DISCONTINUED | OUTPATIENT
Start: 2022-09-13 | End: 2022-09-14 | Stop reason: HOSPADM

## 2022-09-13 RX ORDER — TACROLIMUS 0.5 MG/1
0.5 CAPSULE ORAL DAILY
Status: DISCONTINUED | OUTPATIENT
Start: 2022-09-14 | End: 2022-09-14 | Stop reason: HOSPADM

## 2022-09-13 RX ORDER — ATOVAQUONE 750 MG/5ML
1500 SUSPENSION ORAL
Status: DISCONTINUED | OUTPATIENT
Start: 2022-09-14 | End: 2022-09-14 | Stop reason: HOSPADM

## 2022-09-13 RX ORDER — TACROLIMUS 0.5 MG/1
1 CAPSULE ORAL EVERY 24 HOURS
Status: DISCONTINUED | OUTPATIENT
Start: 2022-09-14 | End: 2022-09-14 | Stop reason: HOSPADM

## 2022-09-13 RX ORDER — ALPRAZOLAM 0.5 MG/1
1 TABLET ORAL ONCE
Status: COMPLETED | OUTPATIENT
Start: 2022-09-13 | End: 2022-09-13

## 2022-09-13 RX ADMIN — INSULIN LISPRO 2 UNITS: 100 INJECTION, SOLUTION INTRAVENOUS; SUBCUTANEOUS at 21:07

## 2022-09-13 RX ADMIN — ALPRAZOLAM 0.5 MG: 0.5 TABLET ORAL at 23:44

## 2022-09-13 RX ADMIN — OXYCODONE HYDROCHLORIDE 10 MG: 10 TABLET ORAL at 21:04

## 2022-09-13 RX ADMIN — HYDROMORPHONE HYDROCHLORIDE 1 MG: 1 INJECTION, SOLUTION INTRAMUSCULAR; INTRAVENOUS; SUBCUTANEOUS at 01:24

## 2022-09-13 RX ADMIN — PANCRELIPASE 48000 UNITS: 24000; 76000; 120000 CAPSULE, DELAYED RELEASE PELLETS ORAL at 12:36

## 2022-09-13 RX ADMIN — PANCRELIPASE 48000 UNITS: 24000; 76000; 120000 CAPSULE, DELAYED RELEASE PELLETS ORAL at 17:05

## 2022-09-13 RX ADMIN — INSULIN LISPRO 1 UNITS: 100 INJECTION, SOLUTION INTRAVENOUS; SUBCUTANEOUS at 09:27

## 2022-09-13 RX ADMIN — PREDNISONE 10 MG: 10 TABLET ORAL at 09:20

## 2022-09-13 RX ADMIN — PREGABALIN 100 MG: 100 CAPSULE ORAL at 09:20

## 2022-09-13 RX ADMIN — LINEZOLID 600 MG: 600 INJECTION, SOLUTION INTRAVENOUS at 09:21

## 2022-09-13 RX ADMIN — APIXABAN 5 MG: 5 TABLET, FILM COATED ORAL at 06:22

## 2022-09-13 RX ADMIN — VALACYCLOVIR HYDROCHLORIDE 500 MG: 500 TABLET, FILM COATED ORAL at 09:20

## 2022-09-13 RX ADMIN — PIPERACILLIN AND TAZOBACTAM 4.5 G: 4; .5 INJECTION, POWDER, LYOPHILIZED, FOR SOLUTION INTRAVENOUS at 03:50

## 2022-09-13 RX ADMIN — HYDROMORPHONE HYDROCHLORIDE 1 MG: 1 INJECTION, SOLUTION INTRAMUSCULAR; INTRAVENOUS; SUBCUTANEOUS at 15:49

## 2022-09-13 RX ADMIN — APIXABAN 5 MG: 5 TABLET, FILM COATED ORAL at 17:05

## 2022-09-13 RX ADMIN — HYDROMORPHONE HYDROCHLORIDE 1 MG: 1 INJECTION, SOLUTION INTRAMUSCULAR; INTRAVENOUS; SUBCUTANEOUS at 22:18

## 2022-09-13 RX ADMIN — POSACONAZOLE 200 MG: 40 SUSPENSION ORAL at 14:35

## 2022-09-13 RX ADMIN — PANTOPRAZOLE SODIUM 40 MG: 40 TABLET, DELAYED RELEASE ORAL at 05:01

## 2022-09-13 RX ADMIN — SODIUM CHLORIDE 125 ML/HR: 0.9 INJECTION, SOLUTION INTRAVENOUS at 05:02

## 2022-09-13 RX ADMIN — PIPERACILLIN AND TAZOBACTAM 4.5 G: 4; .5 INJECTION, POWDER, LYOPHILIZED, FOR SOLUTION INTRAVENOUS at 22:09

## 2022-09-13 RX ADMIN — PIPERACILLIN AND TAZOBACTAM 4.5 G: 4; .5 INJECTION, POWDER, LYOPHILIZED, FOR SOLUTION INTRAVENOUS at 17:08

## 2022-09-13 RX ADMIN — PIPERACILLIN AND TAZOBACTAM 4.5 G: 4; .5 INJECTION, POWDER, LYOPHILIZED, FOR SOLUTION INTRAVENOUS at 10:00

## 2022-09-13 RX ADMIN — HYDROMORPHONE HYDROCHLORIDE 1 MG: 1 INJECTION, SOLUTION INTRAMUSCULAR; INTRAVENOUS; SUBCUTANEOUS at 11:12

## 2022-09-13 RX ADMIN — OXYCODONE HYDROCHLORIDE 10 MG: 10 TABLET ORAL at 05:00

## 2022-09-13 RX ADMIN — ALPRAZOLAM 1 MG: 0.5 TABLET ORAL at 06:11

## 2022-09-13 RX ADMIN — INSULIN LISPRO 1 UNITS: 100 INJECTION, SOLUTION INTRAVENOUS; SUBCUTANEOUS at 12:36

## 2022-09-13 RX ADMIN — INSULIN LISPRO 2 UNITS: 100 INJECTION, SOLUTION INTRAVENOUS; SUBCUTANEOUS at 17:15

## 2022-09-13 RX ADMIN — HYDROMORPHONE HYDROCHLORIDE 1 MG: 1 INJECTION, SOLUTION INTRAMUSCULAR; INTRAVENOUS; SUBCUTANEOUS at 06:53

## 2022-09-13 RX ADMIN — PANCRELIPASE 48000 UNITS: 24000; 76000; 120000 CAPSULE, DELAYED RELEASE PELLETS ORAL at 09:21

## 2022-09-13 RX ADMIN — OXYCODONE HYDROCHLORIDE 10 MG: 10 TABLET ORAL at 09:26

## 2022-09-13 RX ADMIN — OXYCODONE HYDROCHLORIDE 10 MG: 10 TABLET ORAL at 14:34

## 2022-09-13 RX ADMIN — PREGABALIN 100 MG: 100 CAPSULE ORAL at 17:05

## 2022-09-13 NOTE — UTILIZATION REVIEW
Initial Clinical Review    Admission: Date/Time/Statement:   Admission Orders (From admission, onward)     Ordered        09/12/22 2116  INPATIENT ADMISSION  Once                      Orders Placed This Encounter   Procedures    INPATIENT ADMISSION     Standing Status:   Standing     Number of Occurrences:   1     Order Specific Question:   Level of Care     Answer:   Med Surg [16]     Order Specific Question:   Estimated length of stay     Answer:   More than 2 Midnights     Order Specific Question:   Certification     Answer:   I certify that inpatient services are medically necessary for this patient for a duration of greater than two midnights  See H&P and MD Progress Notes for additional information about the patient's course of treatment  ED Arrival Information     Expected   -    Arrival   9/12/2022 17:15    Acuity   Emergent            Means of arrival   Wheelchair    Escorted by   Spouse    Service   Hospitalist    Admission type   Emergency            Arrival complaint   SOB           Chief Complaint   Patient presents with    Shortness of Breath     Patient [facility]id  Patient has a history of cystic fibrosis and 1 lung  Patient sent to ER for PE study  Initial Presentation: 42 y o  female to ED from home w SOB   PMHX pancreatic insufficiency, type 1 diabetes, cystic fibrosis, status post pneumonectomy   Admitted IP status w/ bronchopneumonia   Plan to cont home steroids and anti fungals , IV zosyn and linezolid , consult pulm   Possible transfer to [facility]   DVT cont eliquis   Pancreatic insufficiency cont pancrelipase  DM cont insulin regimen   Date: 9/13 Day 2: pt is pending transfer to [facility]   Cont iv abx and monitoring   ID and pulm consulted and pending   Cont steroids and anti fungals      ED Triage Vitals   Temperature Pulse Respirations Blood Pressure SpO2   09/12/22 1724 09/12/22 1724 09/12/22 1724 09/12/22 1724 09/12/22 1724   98 1 °F (36 7 °C) 99 (!) 24 144/77 97 %      Temp Source Heart Rate Source Patient Position - Orthostatic VS BP Location FiO2 (%)   09/12/22 2207 09/12/22 1900 -- 09/12/22 2207 --   Oral Monitor  Left arm       Pain Score       09/12/22 1831       7          Wt Readings from Last 1 Encounters:   09/12/22 50 2 kg (110 lb 10 7 oz)     Additional Vital Signs:   09/13/22 07:50:59 98 3 °F (36 8 °C) 76 20 126/73 91 95 % --   09/13/22 05:00:30 98 4 °F (36 9 °C) 77 -- -- -- 95 % --   09/13/22 0500 98 4 °F (36 9 °C) 76 22 132/82 99 95 % None (Room air)   09/13/22 0002 -- -- -- -- -- 94 % None (Room air)   09/12/22 22:07:03 99 1 °F (37 3 °C) 98 24 Abnormal  148/85 106 94 % Nasal cannula   09/12/22 2139 -- 95 30 Abnormal  -- -- 94 % --   09/12/22 2130 -- 91 20 134/91 106 97 % --   09/12/22 2100 -- 92 20 140/84 104 97 % None (Room air)   09/12/22 2000 -- 97 22 124/80 96 96 % --   09/12/22 1900 -- 104 22 147/89 113 96 %        Pertinent Labs/Diagnostic Test Results:   CTA ED chest PE Study   Final Result by Arcadio Boast, MD (09/12 1938)      No pulmonary embolus  Moderate tree-in-bud nodularity throughout the left lung with a few larger nodules, up to 8 mm, compatible with bronchiolitis and possibly early bronchopneumonia  Left lung transplant and right pneumonectomy  Workstation performed: [id number]         XR chest 1 view portable   Final Result by Briana Velez MD (09/13 0831)      Right-sided pneumonectomy  Scattered nodules in the left lung likely infectious in origin              Workstation performed: [id number]           Results from last 7 days   Lab Units 09/12/22 1819   SARS-COV-2  Negative     Results from last 7 days   Lab Units 09/13/22  0603 09/12/22 1819   WBC Thousand/uL 3 61* 4 95   HEMOGLOBIN g/dL 8 2* 10 5*   HEMATOCRIT % 27 1* 34 1*   PLATELETS Thousands/uL 63* 80*   NEUTROS ABS Thousands/µL  --  3 56     Results from last 7 days   Lab Units 09/13/22  0603 09/12/22 1819 SODIUM mmol/L 141 138   POTASSIUM mmol/L 4 6 5 0   CHLORIDE mmol/L 108 103   CO2 mmol/L 21 20*   ANION GAP mmol/L 12 15*   BUN mg/dL 22 31*   CREATININE mg/dL 1 18 1 67*   EGFR ml/min/1 73sq m 56 36   CALCIUM mg/dL 7 9* 8 9     Results from last 7 days   Lab Units 09/12/22  1819   AST U/L 11   ALT U/L 38   ALK PHOS U/L 73   TOTAL PROTEIN g/dL 7 6   ALBUMIN g/dL 2 9*   TOTAL BILIRUBIN mg/dL 0 28     Results from last 7 days   Lab Units 09/13/22  0749 09/12/22  2208   POC GLUCOSE mg/dl 169* 167*     Results from last 7 days   Lab Units 09/13/22  0603 09/12/22  1819   GLUCOSE RANDOM mg/dL 193* 410*       Results from last 7 days   Lab Units 09/12/22  2229 09/12/22 2056 09/12/22  1819   HS TNI 0HR ng/L  --   --  13   HS TNI 2HR ng/L  --  12  --    HSTNI D2 ng/L  --  -1  --    HS TNI 4HR ng/L 15  --   --    HSTNI D4 ng/L 2  --   --      Results from last 7 days   Lab Units 09/12/22  1819   D-DIMER QUANTITATIVE ug/ml FEU 1 77*     Results from last 7 days   Lab Units 09/12/22  1819   PROTIME seconds 12 7   INR  0 97   PTT seconds 24     Results from last 7 days   Lab Units 09/13/22  0603 09/12/22 2229 09/12/22  1819   PROCALCITONIN ng/ml 0 39* 0 49* 0 59*     Results from last 7 days   Lab Units 09/13/22  0603 09/13/22  0343 09/13/22  0133 09/12/22 2229 09/12/22 2056 09/12/22  1819   LACTIC ACID mmol/L 2 8* 2 9* 3 0* 3 8* 3 5* 5 3*     Results from last 7 days   Lab Units 09/12/22  1819   INFLUENZA A PCR  Negative   INFLUENZA B PCR  Negative   RSV PCR  Negative     Results from last 7 days   Lab Units 09/12/22  1830 09/12/22  1819   BLOOD CULTURE  Received in Microbiology Lab  Culture in Progress  Received in Microbiology Lab  Culture in Progress       ED Treatment:   Medication Administration from 09/12/2022 1715 to 09/12/2022 2203       Date/Time Order Dose Route Action     09/12/2022 1815 albuterol inhalation solution 5 mg 5 mg Nebulization Given     09/12/2022 1815 ipratropium (ATROVENT) 0 02 % inhalation solution 0 5 mg 0 5 mg Nebulization Given     09/12/2022 1831 fentanyl citrate (PF) 100 MCG/2ML 75 mcg 75 mcg Intravenous Given     09/12/2022 1935 sodium chloride 0 9 % bolus 1,000 mL 1,000 mL Intravenous New Bag     09/12/2022 2017 fentanyl citrate (PF) 100 MCG/2ML 75 mcg 75 mcg Intravenous Given     09/12/2022 2129 piperacillin-tazobactam (ZOSYN) 3 375 g in sodium chloride 0 9 % 100 mL IVPB 3 375 g Intravenous New Bag     09/12/2022 2150 HYDROmorphone (DILAUDID) injection 1 mg 1 mg Intravenous Given        Past Medical History:   Diagnosis Date    ABPA (allergic bronchopulmonary aspergillosis) ([facility] ) 7/8/2012    Acute kidney injury ([facility] ) 9/8/2017    Acute on chronic respiratory failure ([facility] ) 3/4/2017    Last Assessment & Plan:  Formatting of this note might be different from the original  Recurrent with clinical worsening with enlarging PTX, s/p multiple chest tubes, but new fevers 6/19 and worsening leukocytosis indicating infection as cause of 6/19 decompensation with profound hypoxia in setting of shunting through right lung and continued pneumothorax s/p 2nd chest tube placement  6/27: Right    Anaphylaxis 6/6/2022    Asthma     Chest pain 9/21/2021    Chronic pain     Chronic respiratory failure with hypoxia ([facility] ) 9/7/2021    COVID-19 6/3/2022    Cystic fibrosis ([facility] )     Diabetes mellitus (HCC)     GERD (gastroesophageal reflux disease)     HBP (high blood pressure)     HCAP (healthcare-associated pneumonia) 3/6/2020    Influenza B 2/23/2020    Kidney stone     Neutropenic fever ([facility] ) 9/26/2020    Pneumonia due to Pseudomonas species ([facility] ) 10/28/2010    Last Assessment & Plan:  Formatting of this note might be different from the original  Polymicrobial pna w/ pseudomonas, M   Abscessus, Cadida glabrata/albicans    PONV (postoperative nausea and vomiting)     Pseudomonas aeruginosa infection 9/20/2012    Transplant recipient     Lung- 2013, 2017     Present on Admission:   Type 1 diabetes ([facility] )   [facility] thrombosis) [facility]   Pancreatic insufficiency      Admitting Diagnosis: Pneumonia [J18 9]  SOB (shortness of breath) [R06 02]  GEOFF (acute kidney injury) ([facility] ) [N17 9]  Age/Sex: 42 y o  female  Admission Orders:  Scheduled Medications:  apixaban, 5 mg, Oral, BID  insulin lispro, 1-5 Units, Subcutaneous, HS  insulin lispro, 1-6 Units, Subcutaneous, TID AC  linezolid, 600 mg, Intravenous, Q12H  pancrelipase (Lip-Prot-Amyl), 48,000 Units, Oral, TID With Meals  pantoprazole, 40 mg, Oral, Early Morning  piperacillin-tazobactam, 4 5 g, Intravenous, Q6H  predniSONE, 10 mg, Oral, Daily  pregabalin, 100 mg, Oral, BID  valACYclovir, 500 mg, Oral, Daily      Continuous IV Infusions:  sodium chloride, 125 mL/hr, Intravenous, Continuous      PRN Meds:  acetaminophen, 650 mg, Oral, Q6H PRN  benzonatate, 100 mg, Oral, TID PRN  HYDROmorphone, 1 mg, Intravenous, Q4H PRN  9/12 x1  9/13 x2  oxyCODONE, 10 mg, Oral, Q4H PRN  oxyCODONE, 5 mg, Oral, Q4H PRN    Fingerstick ac and hs   I&O   Inc spirom   Aspiration precautions  Up and OOB       IP CONSULT TO PULMONOLOGY  IP CONSULT TO INFECTIOUS DISEASES    Network Utilization Review Department  ATTENTION: Please call with any questions or concerns to [phone] and carefully listen to the prompts so that you are directed to the right person  All voicemails are confidential   Malick Burton all requests for admission clinical reviews, approved or denied determinations and any other requests to dedicated fax number below belonging to the campus where the patient is receiving treatment   List of dedicated fax numbers for the Facilities:  [address] DENIALS (Administrative/Medical Necessity) [phone]   [address] (Maternity/NICU/Pediatrics) 261 [facility],[address] [place] [address] Shannan Tran [address] [place] [address] [address] [id number] [place] [address] [phone] [place] [address] [phone]

## 2022-09-13 NOTE — H&P
[facility]  H&P- Miguelina Shawn Zelonisgecek 1975, 42 y o  female MRN: [id number]  Unit/Bed#: ED 14 Encounter: [id number]  Primary Care Provider: Jv Jurado MD   Date and time admitted to hospital: 9/12/2022  5:28 PM    * [address]  Patient with history of cystic fibrosis and pneumonectomy, lung transplant initially presented with shortness breath cough found to have bronchopneumonia ER discuss with patient's pulmonologist at [facility] recommended IV antibiotics and possible transfer tomorrow  Currently and at capacity  Recommended holding Tacker limits for now given GEOFF  Continue home steroids and anti fungals  Was recently discharged from their  Dr  Perfecto Downs is patient's doctor from [facility]  He recommended IV Zosyn and linezolid  Will consult pulmonology and ID    DVT (deep venous thrombosis) ([facility] )  Assessment & Plan  Continue Eliquis    Pancreatic insufficiency  Assessment & Plan  Continue pancrelipase    Type 1 diabetes ([facility] )  Assessment & Plan  Lab Results   Component Value Date    HGBA1C 9 1 (H) 08/25/2022       No results for input(s): POCGLU in the last 72 hours  Blood Sugar Average: Last 72 hrs:   continue home insulin regimen      VTE Prophylaxis: Apixaban (Eliquis)  / sequential compression device   Code Status: full code  POLST: There is no POLST form on file for this patient (pre-hospital)  Discussion with family: pty    Anticipated Length of Stay:  Patient will be admitted on an Inpatient basis with an anticipated length of stay of  > 2 midnights  Justification for Hospital Stay:  Bronchopneumonia    Total Time for Visit, including Counseling / Coordination of Care: 60 minutes  Greater than 50% of this total time spent on direct patient counseling and coordination of care      Chief Complaint:   Cough    History of Present Illness:    Prabhakar Cameron is a 42 y o  female with past medical history significant of pancreatic insufficiency, type 1 diabetes, cystic fibrosis, status post pneumonectomy in lung transplant initially presented shortness breath and cough  Currently denies fevers, chills or any other complaints  Was recently discharged from [address] [place] [address] Systems:    Review of Systems   Constitutional: Negative for activity change, appetite change, chills, diaphoresis, fever and unexpected weight change  HENT: Negative for congestion, facial swelling and rhinorrhea  Eyes: Negative for photophobia and visual disturbance  Respiratory: Positive for cough and shortness of breath  Negative for wheezing  Cardiovascular: Negative for chest pain and palpitations  Gastrointestinal: Negative for abdominal pain, blood in stool, constipation, diarrhea, nausea and vomiting  Genitourinary: Negative for decreased urine volume, difficulty urinating, dysuria, flank pain, frequency, hematuria and urgency  Musculoskeletal: Negative for arthralgias, back pain, joint swelling and myalgias  Neurological: Negative for dizziness, syncope, facial asymmetry, light-headedness, numbness and headaches  Psychiatric/Behavioral: Negative for confusion and decreased concentration  The patient is not nervous/anxious  Past Medical and Surgical History:     Past Medical History:   Diagnosis Date    ABPA (allergic bronchopulmonary aspergillosis) ([facility] ) 7/8/2012    [facility] ) 9/8/2017    Acute on chronic respiratory failure ([facility] ) 3/4/2017    Last Assessment & Plan:  Formatting of this note might be different from the original  Recurrent with clinical worsening with enlarging PTX, s/p multiple chest tubes, but new fevers 6/19 and worsening leukocytosis indicating infection as cause of 6/19 decompensation with profound hypoxia in setting of shunting through right lung and continued pneumothorax s/p 2nd chest tube placement    6/27: Right    Anaphylaxis 6/6/2022    Asthma     Chest pain 9/21/2021    Chronic pain     Chronic respiratory failure with hypoxia ([facility] ) 9/7/2021    COVID-19 6/3/2022    Cystic fibrosis ([facility] )     Diabetes mellitus (HCC)     GERD (gastroesophageal reflux disease)     HBP (high blood pressure)     HCAP (healthcare-associated pneumonia) 3/6/2020    Influenza B 2/23/2020    Kidney stone     Neutropenic fever ([facility] ) 9/26/2020    Pneumonia due to Pseudomonas species [facility] ) 10/28/2010    Last Assessment & Plan:  Formatting of this note might be different from the original  Polymicrobial pna w/ pseudomonas, M  Abscessus, Cadida glabrata/albicans    PONV (postoperative nausea and vomiting)     Pseudomonas aeruginosa infection 9/20/2012    Transplant recipient     Lung- 2013, 2017       Past Surgical History:   Procedure Laterality Date    APPENDECTOMY      AUGMENTATION MAMMAPLASTY Bilateral 2004    AUGMENTATION MAMMAPLASTY Right 2021    removed     BREAST SURGERY      Aug      BRONCHOSCOPY      CHOLECYSTECTOMY      COLONOSCOPY      EGD  07/2019    Yeast Infect   FL RETROGRADE PYELOGRAM  1/9/2020    FL RETROGRADE PYELOGRAM  2/4/2020    GASTROSTOMY TUBE, PLACE      IR PORT PLACEMENT  3/22/2022    IR PORT REMOVAL  1/10/2022    IR TUNNELED CENTRAL LINE PLACEMENT  1/13/2022    LUNG SURGERY      LUNG TRANSPLANT      X2    OK CYSTO/URETERO W/LITHOTRIPSY &INDWELL STENT INSRT Right 2/4/2020    Procedure: CYSTOSCOPY URETEROSCOPY WITH LITHOTRIPSY HOLMIUM LASER, RETROGRADE PYELOGRAM AND INSERTION STENT URETERAL;  Surgeon: Krish Castillo MD;  Location: [facility];  Service: Urology    OK CYSTOURETHROSCOPY,URETER CATHETER Right 1/9/2020    Procedure: CYSTOSCOPY RETROGRADE PYELOGRAM WITH INSERTION STENT URETERAL;  Surgeon: Krish Castillo MD;  Location: [facility];  Service: Urology   [address] [place] [address]  1/17/2017       Meds/Allergies:    Prior to Admission medications    Medication Sig Start Date End Date Taking?  Authorizing Provider   acetaminophen (TYLENOL) 325 mg tablet Take 3 tablets (719 mg total) by mouth every 8 (eight) hours 9/26/21   WAYNE Paredes   albuterol (2 5 mg/3 mL) 0 083 % nebulizer solution Take 6 mL (5 mg total) by nebulization 2 (two) times a day 9/26/21   WAYNE Paredes   ALPRAZolam Sadia Ready) 1 mg tablet Take 1 tablet (1 mg total) by mouth daily at bedtime as needed for anxiety or sleep 8/11/22   Clifford Gamboa MD   apixaban (ELIQUIS) 5 mg Take 1 tablet (5 mg total) by mouth 2 (two) times a day 9/26/21   WAYNE Paredes   atovaquone (MEPRON) 750 mg/5 mL suspension Take 1,500 mg by mouth daily    Historical Provider, MD   cyclobenzaprine (FLEXERIL) 5 mg tablet Take 1-2 tabs every 8 hours as needed for muscle pain/spasm 8/1/22   Clifford Gamboa MD   dexlansoprazole (DEXILANT) 60 MG capsule Take 1 capsule (60 mg total) by mouth in the morning  5/24/22   David Aguilar MD   dronabinol (MARINOL) 5 MG capsule TAKE 1 CAPSULE (5 MG TOTAL) BY MOUTH 3 (THREE) TIMES A DAY  Patient taking differently: Take 5 mg by mouth 3 (three) times a day as needed 8/19/21   Jessica Dhillon MD   famotidine (PEPCID) 40 MG tablet TAKE 1 TABLET BY MOUTH EVERYDAY AT BEDTIME 6/8/22   Lord Denver MD   hydrocodone-chlorpheniramine polistirex (TUSSIONEX) 10-8 mg/5 mL ER suspension Take 5 mL by mouth every 12 (twelve) hours as needed for cough Max Daily Amount: 10 mL  Patient not taking: Reported on 8/23/2022 8/1/22   Clifford Gamboa MD   insulin lispro (HumaLOG) 100 units/mL injection Inject 4 Units under the skin 3 (three) times a day with meals  Patient taking differently: Inject under the skin 3 (three) times a day with meals Sliding scale 9/26/21   WAYNE Paredes   medroxyPROGESTERone (DEPO-PROVERA) 150 mg/mL injection Inject 1 mL (150 mg total) into a muscle every 3 (three) months 11/1/22   Minesh Garcia MD   metoclopramide (REGLAN) 10 mg tablet TAKE 1 TABLET BY MOUTH 3 TIMES A DAY WITH MEALS   8/19/22   Lord Denver MD   naloxone (NARCAN) 4 mg/0 1 mL nasal spray 4 mg into each nostril 6/6/22   Historical Provider, MD   nystatin (MYCOSTATIN) 500,000 units/5 mL suspension Apply 5 mL (500,000 Units total) to the mouth or throat 4 (four) times a day  Patient not taking: Reported on 8/23/2022 4/25/22   Wyatt Pappas MD   pancrelipase, Lip-Prot-Amyl, (CREON) 24,000 units Take 48,000 Units by mouth 3 (three) times a day with meals    Historical Provider, MD   predniSONE 10 mg tablet Take 10 mg by mouth daily 4/27/21   Historical Provider, MD   pregabalin (Lyrica) 100 mg capsule Take 1 capsule (100 mg total) by mouth 2 (two) times a day 8/26/22   Lennox Pap, MD   Respiratory Therapy Supplies (Nebulizer) SUKHI Please dispense nebulizer machine 7/16/21   Historical Provider, MD   sucralfate (CARAFATE) 1 g/10 mL suspension Take 10 mL (1 g total) by mouth 4 (four) times a day (with meals and at bedtime) 8/1/22   Lennox Pap, MD   tacrolimus (PROGRAF) 1 mg capsule Take 2 mg by mouth 2 (two) times a day 7/1/21   Historical Provider, MD   traZODone (DESYREL) 50 mg tablet Take 0 5 tablets (25 mg total) by mouth daily at bedtime  Patient taking differently: Take 25 mg by mouth daily at bedtime as needed 9/26/21   WAYNE Paredes   valACYclovir (VALTREX) 500 mg tablet Take 500 mg by mouth daily 6/1/21   Historical Provider, MD     I have reviewed home medications with patient personally  Allergies:    Allergies   Allergen Reactions    Bebtelovimab Anaphylaxis    Vancomycin Angioedema and Hives    Gabapentin Hallucinations    Ceftazidime Headache     Severe headaches after desensitization    Nsaids Other (See Comments)     Lung transplant increases risk of renal toxicity, call lung txp provider to discuss if needed       Social History:     Marital Status: /Civil Union     Patient Pre-hospital Living Situation: home  Patient Pre-hospital Level of Mobility: independent  Patient Pre-hospital Diet Restrictions: none  Substance Use History:   Social History Substance and Sexual Activity   Alcohol Use Yes    Comment: social     Social History     Tobacco Use   Smoking Status Former Smoker    Packs/day: 0 50    Years: 12 00    Pack years: 6 00   Smokeless Tobacco Never Used     Social History     Substance and Sexual Activity   Drug Use Yes    Frequency: 2 0 times per week    Types: Marijuana    Comment: medical edible marijuana prescribed       Family History:    Family History   Problem Relation Age of Onset    Alcohol abuse Mother     Mental illness Mother     Alcohol abuse Father     COPD Maternal Grandmother     Lung cancer Maternal Grandmother 72    COPD Maternal Grandfather     COPD Paternal Grandmother     Lung cancer Paternal Grandmother 72    No Known Problems Sister     No Known Problems Paternal Aunt     Breast cancer Neg Hx     Ovarian cancer Neg Hx     Colon cancer Neg Hx        Physical Exam:     Vitals:   Blood Pressure: 140/84 (09/12/22 2100)  Pulse: 92 (09/12/22 2100)  Temperature: 98 1 °F (36 7 °C) (09/12/22 1724)  Respirations: 20 (09/12/22 2100)  SpO2: 97 % (09/12/22 2100)    Physical Exam  Constitutional:       General: She is not in acute distress  Appearance: She is well-developed  She is not diaphoretic  HENT:      Head: Normocephalic and atraumatic  Nose: Nose normal       Mouth/Throat:      Pharynx: No oropharyngeal exudate  Eyes:      General: No scleral icterus  Right eye: No discharge  Left eye: No discharge  Conjunctiva/sclera: Conjunctivae normal    Neck:      Thyroid: No thyromegaly  Vascular: No JVD  Cardiovascular:      Rate and Rhythm: Normal rate and regular rhythm  Heart sounds: Normal heart sounds  No murmur heard  No friction rub  No gallop  Pulmonary:      Effort: Pulmonary effort is normal  No respiratory distress  Breath sounds: Normal breath sounds  No wheezing or rales  Chest:      Chest wall: No tenderness     Abdominal:      General: Bowel sounds are normal  There is no distension  Palpations: Abdomen is soft  Tenderness: There is no abdominal tenderness  There is no guarding or rebound  Musculoskeletal:         General: No tenderness or deformity  Normal range of motion  Cervical back: Normal range of motion and neck supple  Skin:     General: Skin is warm and dry  Findings: No erythema or rash  Neurological:      Mental Status: She is alert  Mental status is at baseline  Cranial Nerves: No cranial nerve deficit  Sensory: No sensory deficit  Motor: No abnormal muscle tone  Coordination: Coordination normal            Additional Data:     Lab Results: I have personally reviewed pertinent reports  Results from last 7 days   Lab Units 09/12/22  1819   WBC Thousand/uL 4 95   HEMOGLOBIN g/dL 10 5*   HEMATOCRIT % 34 1*   PLATELETS Thousands/uL 80*   NEUTROS PCT % 73   LYMPHS PCT % 24   MONOS PCT % 3*   EOS PCT % 0     Results from last 7 days   Lab Units 09/12/22  1819   SODIUM mmol/L 138   POTASSIUM mmol/L 5 0   CHLORIDE mmol/L 103   CO2 mmol/L 20*   BUN mg/dL 31*   CREATININE mg/dL 1 67*   ANION GAP mmol/L 15*   CALCIUM mg/dL 8 9   ALBUMIN g/dL 2 9*   TOTAL BILIRUBIN mg/dL 0 28   ALK PHOS U/L 73   ALT U/L 38   AST U/L 11   GLUCOSE RANDOM mg/dL 410*     Results from last 7 days   Lab Units 09/12/22  1819   INR  0 97             Results from last 7 days   Lab Units 09/12/22  1819   LACTIC ACID mmol/L 5 3*   PROCALCITONIN ng/ml 0 59*       Imaging: I have personally reviewed pertinent reports  CTA ED chest PE Study   Final Result by Clyde Armstrong MD (09/12 1938)      No pulmonary embolus  Moderate tree-in-bud nodularity throughout the left lung with a few larger nodules, up to 8 mm, compatible with bronchiolitis and possibly early bronchopneumonia  Left lung transplant and right pneumonectomy               Workstation performed: [id number]         XR chest 1 view portable    (Results Pending) EKG, Pathology, and Other Studies Reviewed on Admission:   · EKG: reviewed    Allscripts / Epic Records Reviewed: Yes     ** Please Note: This note has been constructed using a voice recognition system   **

## 2022-09-13 NOTE — PLAN OF CARE
Problem: PAIN - ADULT  Goal: Verbalizes/displays adequate comfort level or baseline comfort level  Description: Interventions:  - Encourage patient to monitor pain and request assistance  - Assess pain using appropriate pain scale  - Administer analgesics based on type and severity of pain and evaluate response  - Implement non-pharmacological measures as appropriate and evaluate response  - Consider cultural and social influences on pain and pain management  - Notify physician/advanced practitioner if interventions unsuccessful or patient reports new pain  Outcome: Progressing     Problem: INFECTION - ADULT  Goal: Absence or prevention of progression during hospitalization  Description: INTERVENTIONS:  - Assess and monitor for signs and symptoms of infection  - Monitor lab/diagnostic results  - Monitor all insertion sites, i e  indwelling lines, tubes, and drains  - Monitor endotracheal if appropriate and nasal secretions for changes in amount and color  - Bunch appropriate cooling/warming therapies per order  - Administer medications as ordered  - Instruct and encourage patient and family to use good hand hygiene technique  - Identify and instruct in appropriate isolation precautions for identified infection/condition  Outcome: Progressing  Goal: Absence of fever/infection during neutropenic period  Description: INTERVENTIONS:  - Monitor WBC    Outcome: Progressing     Problem: DISCHARGE PLANNING  Goal: Discharge to home or other facility with appropriate resources  Description: INTERVENTIONS:  - Identify barriers to discharge w/patient and caregiver  - Arrange for needed discharge resources and transportation as appropriate  - Identify discharge learning needs (meds, wound care, etc )  - Arrange for interpretive services to assist at discharge as needed  - Refer to Case Management Department for coordinating discharge planning if the patient needs post-hospital services based on physician/advanced practitioner order or complex needs related to functional status, cognitive ability, or social support system  Outcome: Progressing     Problem: Knowledge Deficit  Goal: Patient/family/caregiver demonstrates understanding of disease process, treatment plan, medications, and discharge instructions  Description: Complete learning assessment and assess knowledge base    Interventions:  - Provide teaching at level of understanding  - Provide teaching via preferred learning methods  Outcome: Progressing     Problem: CARDIOVASCULAR - ADULT  Goal: Maintains optimal cardiac output and hemodynamic stability  Description: INTERVENTIONS:  - Monitor I/O, vital signs and rhythm  - Monitor for S/S and trends of decreased cardiac output  - Administer and titrate ordered vasoactive medications to optimize hemodynamic stability  - Assess quality of pulses, skin color and temperature  - Assess for signs of decreased coronary artery perfusion  - Instruct patient to report change in severity of symptoms  Outcome: Progressing  Goal: Absence of cardiac dysrhythmias or at baseline rhythm  Description: INTERVENTIONS:  - Continuous cardiac monitoring, vital signs, obtain 12 lead EKG if ordered  - Administer antiarrhythmic and heart rate control medications as ordered  - Monitor electrolytes and administer replacement therapy as ordered  Outcome: Progressing     Problem: RESPIRATORY - ADULT  Goal: Achieves optimal ventilation and oxygenation  Description: INTERVENTIONS:  - Assess for changes in respiratory status  - Assess for changes in mentation and behavior  - Position to facilitate oxygenation and minimize respiratory effort  - Oxygen administered by appropriate delivery if ordered  - Initiate smoking cessation education as indicated  - Encourage broncho-pulmonary hygiene including cough, deep breathe, Incentive Spirometry  - Assess the need for suctioning and aspirate as needed  - Assess and instruct to report SOB or any respiratory difficulty  - Respiratory Therapy support as indicated  Outcome: Progressing     Problem: METABOLIC, FLUID AND ELECTROLYTES - ADULT  Goal: Electrolytes maintained within normal limits  Description: INTERVENTIONS:  - Monitor labs and assess patient for signs and symptoms of electrolyte imbalances  - Administer electrolyte replacement as ordered  - Monitor response to electrolyte replacements, including repeat lab results as appropriate  - Instruct patient on fluid and nutrition as appropriate  Outcome: Progressing  Goal: Fluid balance maintained  Description: INTERVENTIONS:  - Monitor labs   - Monitor I/O and WT  - Instruct patient on fluid and nutrition as appropriate  - Assess for signs & symptoms of volume excess or deficit  Outcome: Progressing  Goal: Glucose maintained within target range  Description: INTERVENTIONS:  - Monitor Blood Glucose as ordered  - Assess for signs and symptoms of hyperglycemia and hypoglycemia  - Administer ordered medications to maintain glucose within target range  - Assess nutritional intake and initiate nutrition service referral as needed  Outcome: Progressing     Problem: HEMATOLOGIC - ADULT  Goal: Maintains hematologic stability  Description: INTERVENTIONS  - Assess for signs and symptoms of bleeding or hemorrhage  - Monitor labs  - Administer supportive blood products/factors as ordered and appropriate  Outcome: Progressing

## 2022-09-13 NOTE — ASSESSMENT & PLAN NOTE
Patient with history of cystic fibrosis and pneumonectomy, lung transplant initially presented with shortness breath cough found to have bronchopneumonia ER discuss with patient's pulmonologist at [facility] recommended IV antibiotics and possible transfer tomorrow    Currently and at capacity  Recommended holding Tacker limits for now given GEOFF  Continue home steroids and anti fungals  Was recently discharged from their  Dr Antony Be is patient's doctor from [facility]  He recommended IV Zosyn and linezolid  Will consult pulmonology and ID

## 2022-09-13 NOTE — CONSULTS
Consultation - Infectious Disease   Korinabenji He Tong 42 y o  female MRN: [id number]  Unit/Bed#: -01 Encounter: [id number]      IMPRESSION & RECOMMENDATIONS:     1  Pulmonary nodules  Patient with recent prolonged hospitalization at [facility], discharged 9/7/22  While there was treated for 2 weeks with Zosyn and linezolid (cultures grew MSSA, PsA, GBS)  Also started on posaconazole for presumed pulmonary fungal infection given pulmonary nodules on CT and persistent elevation of BD Glucan >500  Extensive work up for fungal etiology negative as below  Now presents to [facility] with worsening cough and dyspnea  CT Chest shows moderate tree-in-bud nodularity throughout the left lung with a few larger nodules compatible with bronchiolitis and possibly early bronchopneumonia; however, suspect this is similar to prior imaging at [facility] (cannot directly compare)  COVID PCR negative  She has no fever or leukocytosis  Procalcitonin is mildly elevated but may be due to GEOFF  At this time, consider progressive fungal infection vs other atypical infection, viral pneumonia, developing bacterial pneumonia (less likely), non infectious etiology such as organizing PNA, PTLD, rejection     -continue IV Zosyn   -stop IV Linezolid due to worsening thrombocytopenia  Recent cultures with growth of MSSA   -check respiratory pathogen panel, MRSA nares, sputum culture if able   -follow up blood cultures   -restart Posacoazole--200mg suspension TID since we do not have DR tablet available today   -awaiting transfer to [place]    2  Dyspnea  In setting of #1  Recent prolonged hospitalization at [facility], discharged on PO posaconazole  She is stable on room air but reports worsening respiratory symptoms since discharge    -management as above    3  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  -PJP ppx: continue atovaquone   -viral ppx: currently on valacyclovir  Recent CMV PCR negative, will recheck along with EBV given worsening cytopenias   -fungal: treatment dose posaconazole   -immunosuppression  Continue prednisone 10mg daily  Check tacrolimus level given GEOFF, currently on hold  Will restart tomorrow given improvement in Cr    4  GEOFF  POA  Associated with elevated lactate and AGMA, suspect prerenal state  Improving with IVF  -dose adjust antibiotics for CrCl   -monitor creatinine    5  Pancytopenia  New since discharge from [facility]  May be medication related (linezolid) vs infection    -check CMV, EBV PCR   -monitor CBC    I have discussed the above management plan in detail with the primary service  ID will follow  I have performed an extensive review of the medical records in Epic including review of the notes, radiographs, and laboratory results     HISTORY OF PRESENT ILLNESS:  Reason for Consult: pneumonia in lung transplant recipient  HPI: Mat Hall is a 42year old woman with a history of cystic fibrosis status post bilateral lung transplant 2013 (CMV D-/R-) complicated by severe ODALYS status post left single lung transplant 3/2017 (CMV D+/R-), 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 8/25-9/7/22 with several weeks of worsening cough and shortness of breath following COVID infection in June  The patient underwent bronchoscopy and was found to have MSSA, Pseudomonas and GBS on BAL  She was treated with a 2 week course of Zosyn and linezolid   CT chest also showed pulmonary nodules and beta d glucan was elevated >500, so she was started on PO posaconazole 300mg daily given concern for fungal infection  The patient now presents to [address] [place] [address] 9/12/22 with worsening dyspnea  The patient states that she was started to feel better after discharge from [facility] 9/7, but the following day again developed fatigue, dyspnea, worsening nonproductive cough  She denies fever or chills  She has been taking the Posaconazole without missed doses  The patient has had no interim new animal or environmental exposures  On presentation, the patient was afebrile, with WBC count of 4 95, thrombocytopenia new from prior, lactate 5 3, AGMA, Creatinine 1 67  COVID/flu/RSV PCR was negative  The patient is not requiring O2  CT chest showed moderate tree-in-bud nodularity throughout the left lung with a few larger nodules, up to 8 mm, compatible with bronchiolitis  The transplant provider at [facility] was contacted, and the patient was started on IV Zosyn and IV linezolid  She is awaiting transfer to [facility]  REVIEW OF SYSTEMS:  A complete review of systems is negative other than that noted in the HPI  PAST MEDICAL HISTORY:  Past Medical History:   Diagnosis Date    ABPA (allergic bronchopulmonary aspergillosis) ([facility] ) 7/8/2012    Acute kidney injury ([facility] ) 9/8/2017    [facility] 3/4/2017    Last Assessment & Plan:  Formatting of this note might be different from the original  Recurrent with clinical worsening with enlarging PTX, s/p multiple chest tubes, but new fevers 6/19 and worsening leukocytosis indicating infection as cause of 6/19 decompensation with profound hypoxia in setting of shunting through right lung and continued pneumothorax s/p 2nd chest tube placement    6/27: Right    Anaphylaxis 6/6/2022    Asthma     Chest pain 9/21/2021    Chronic pain     Chronic respiratory failure with hypoxia ([facility] ) 9/7/2021    COVID-19 6/3/2022    Cystic fibrosis ([facility] )     Diabetes mellitus (HCC)     GERD (gastroesophageal reflux disease)     HBP (high blood pressure)     HCAP (healthcare-associated pneumonia) 3/6/2020    Influenza B 2/23/2020    Kidney stone     Neutropenic fever ([facility] ) 9/26/2020    Pneumonia due to Pseudomonas species ([facility] ) 10/28/2010    Last Assessment & Plan:  Formatting of this note might be different from the original  Polymicrobial pna w/ pseudomonas, M  Abscessus, Cadida glabrata/albicans    PONV (postoperative nausea and vomiting)     Pseudomonas aeruginosa infection 9/20/2012    Transplant recipient     Lung- 2013, 2017     Past Surgical History:   Procedure Laterality Date    APPENDECTOMY      AUGMENTATION MAMMAPLASTY Bilateral 2004    AUGMENTATION MAMMAPLASTY Right 2021    removed     BREAST SURGERY      Aug      BRONCHOSCOPY      CHOLECYSTECTOMY      COLONOSCOPY      EGD  07/2019    Yeast Infect       FL RETROGRADE PYELOGRAM  1/9/2020    FL RETROGRADE PYELOGRAM  2/4/2020    GASTROSTOMY TUBE, PLACE      IR PORT PLACEMENT  3/22/2022    IR PORT REMOVAL  1/10/2022    IR TUNNELED CENTRAL LINE PLACEMENT  1/13/2022    LUNG SURGERY      LUNG TRANSPLANT      X2    NH CYSTO/URETERO W/LITHOTRIPSY &INDWELL STENT INSRT Right 2/4/2020    Procedure: CYSTOSCOPY URETEROSCOPY WITH LITHOTRIPSY HOLMIUM LASER, RETROGRADE PYELOGRAM AND INSERTION STENT URETERAL;  Surgeon: Benjamin De Paz MD;  Location: [facility];  Service: Urology    NH CYSTOURETHROSCOPY,URETER CATHETER Right 1/9/2020    Procedure: CYSTOSCOPY RETROGRADE PYELOGRAM WITH INSERTION STENT URETERAL;  Surgeon: Benjamin De Paz MD;  [facility];  Service: Urology   [address] [place] [address]  1/17/2017       FAMILY HISTORY:  Non-contributory    SOCIAL HISTORY:  Social History   Social History     Substance and Sexual Activity   Alcohol Use Yes    Comment: social     Social History     Substance and Sexual Activity   Drug Use Yes    Frequency: 2 0 times per week    Types: Marijuana    Comment: medical edible marijuana prescribed     Social History     Tobacco Use   Smoking Status Former Smoker    Packs/day: 0 50    Years: 12 00    Pack years: 6 00   Smokeless Tobacco Never Used       ALLERGIES:  Allergies   Allergen Reactions    Bebtelovimab Anaphylaxis    Vancomycin Angioedema and Hives    Gabapentin Hallucinations    Ceftazidime Headache     Severe headaches after desensitization    Nsaids Other (See Comments)     Lung transplant increases risk of renal toxicity, call lung txp provider to discuss if needed       MEDICATIONS:  All current active medications have been reviewed  PHYSICAL EXAM:  Temp:  [98 1 °F (36 7 °C)-99 1 °F (37 3 °C)] 98 3 °F (36 8 °C)  HR:  [] 76  Resp:  [20-30] 20  BP: (124-148)/(73-91) 126/73  SpO2:  [94 %-97 %] 95 %  Temp (24hrs), Av 5 °F (36 9 °C), Min:98 1 °F (36 7 °C), Max:99 1 °F (37 3 °C)  Current: Temperature: 98 3 °F (36 8 °C)    Intake/Output Summary (Last 24 hours) at 2022 0950  Last data filed at 2022 0601  Gross per 24 hour   Intake 3830 ml   Output 1550 ml   Net 2280 ml       General Appearance:  Appearing well, nontoxic, and in no distress   Head:  Normocephalic, without obvious abnormality, atraumatic   Eyes:  Conjunctiva pink and sclera anicteric, both eyes   Nose: Nares normal, mucosa normal, no drainage   Throat: Oropharynx moist without lesions   Neck: Supple, symmetrical, no adenopathy, no tenderness/mass/nodules   Back:   Symmetric, no curvature, ROM normal, no CVA tenderness   Lungs:   Crackles left lung base  Status post right pneumonectomy   Chest Wall:  No tenderness or deformity  R chest wall PortACath   Heart:  RRR; no murmur, rub or gallop   Abdomen:   Soft, non-tender, non-distended, positive bowel sounds    Extremities: No cyanosis, clubbing or edema   Skin: No rashes or lesions  No draining wounds noted     Lymph nodes: Cervical, supraclavicular nodes normal   Neurologic: Alert and oriented times 3, extremity strength 5/5 and symmetric       LABS, IMAGING, & OTHER STUDIES:  Lab Results:  I have personally reviewed pertinent labs  Results from last 7 days   Lab Units 09/13/22  0603 09/12/22  1819   WBC Thousand/uL 3 61* 4 95   HEMOGLOBIN g/dL 8 2* 10 5*   PLATELETS Thousands/uL 63* 80*     Results from last 7 days   Lab Units 09/13/22  0603 09/12/22  1819   SODIUM mmol/L 141 138   POTASSIUM mmol/L 4 6 5 0   CHLORIDE mmol/L 108 103   CO2 mmol/L 21 20*   BUN mg/dL 22 31*   CREATININE mg/dL 1 18 1 67*   EGFR ml/min/1 73sq m 56 36   CALCIUM mg/dL 7 9* 8 9   AST U/L  --  11   ALT U/L  --  38   ALK PHOS U/L  --  73     Results from last 7 days   Lab Units 09/12/22  1830 09/12/22  1819   BLOOD CULTURE  Received in Microbiology Lab  Culture in Progress  Received in Microbiology Lab  Culture in Progress  Results from last 7 days   Lab Units 09/13/22  0603 09/12/22  2229 09/12/22  1819   PROCALCITONIN ng/ml 0 39* 0 49* 0 59*             Results from last 7 days   Lab Units 09/12/22  1819   D-DIMER QUANTITATIVE ug/ml FEU 1 77*     Sathya micro work up:  9/1/22: PJP DFA negative  9/1/22 Serum Crypto Ag: negative   9/1/22 Serum Histo: not detected  8/31/22 Urine Histo Ag: not detected  8/31/22 Serum CrAG: negative  8/30/22 B D glucan: >500  8/30/22 Aspergillus Ag: negative  8/29/22 RPP: negative   8/29/22 CMV BAL: 147  8/29/22 BAL Respiratory culture: few staph aureus, few PsA  8/29/22 BAL Fungal Culture: no fungal elements  8/29/22 BAL AFB stain and culture: no acid fast    8/27/22 Aspergillus Ag: negative   8/27/22 B D Glucan >500  8/26/22 EBV serum: not detected   8/25/22 Sputum AFB stain and culture: no acid fast   8/25/22 Sputum Culture: MSSA, PsA, group B strep  8/25/22 MRSA Nares: negative   8/25/22 Blood culture x 2: no growth   8/25/22 CMV: not detected   8/25/22 RPP: negative     Imaging Studies:   I have personally reviewed pertinent imaging study reports and images in PACS      CT chest:  Moderate tree-in-bud nodularity throughout the left lung with a few larger nodules, up to 8 mm, compatible with bronchiolitis and possibly early bronchopneumonia    Other Studies:   I have personally reviewed pertinent reports

## 2022-09-13 NOTE — CONSULTS
Consultation - Pulmonary Medicine   Korinabenji Turciosfern 42 y o  female MRN: [id number]  Unit/Bed#: -01 Encounter: [id number]      Assessment:  1  Shortness of breath  2  Bronchopneumonia  3  Cystic fibrosis status post left lung transplant, right pneumonectomy    Plan:   Patient with recent 2 week hospitalization at [facility] for pneumonia, presented with worsening shortness of breath and fatigue  CTA on admission shows moderate tree-in-bud nodularity throughout the left lung with a few larger nodules up to 8 mm compatible with bronchiolitis and possible early bronchopneumonia  Her procalcitonin is elevated, no leukocytosis, she is afebrile  Infectious disease is also following, ER physician had been in contact with her physician at [facility] and antibiotic recommendations were made  Continue antibiotics per ID and [facility]  Currently on linezolid and Zosyn, completing her course of posaconazole  She had been using albuterol via the nebulizer twice per day, will restart  Continue atovaquone, valacyclovir, Creon, prednisone 10 mg, tacrolimus was being held due to GEOFF  Planned for transfer [facility] when bed is available  History of Present Illness   Physician Requesting Consult: Odin Matson MD  Reason for Consult / Principal Problem:  Cystic fibrosis, pneumonia  Hx and PE limited by:  None  HPI: Heron Shelton is a 42y o  year old female with past medical history of cystic fibrosis status post lung transplant and pneumonectomy, GERD, pancreatic insufficiency, type 2 diabetes, recently hospitalized at [facility] for pneumonia, discharged last week, recent COVID in June who presents with complaint of worsening shortness of breath and generalized fatigue over the last few days  She had an extended hospital stay for about 2 weeks at 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, left single lung transplant in 2017, recurrent pulmonary infections requiring ECMO status post right pneumonectomy in 2021  She has asthma which is pretty well controlled  She has been using albuterol twice per day more recently given her recent infection but generally uses it just as needed  Inpatient consult to Pulmonology  Consult performed by: Rere Lemus PA-C  Consult ordered by: Jaziel Sequeira MD          Review of Systems   Constitutional: Negative  HENT: Negative  Respiratory: Positive for shortness of breath  Cardiovascular: Negative  Gastrointestinal: Negative  Genitourinary: Negative  Musculoskeletal: Negative  Skin: Negative  Allergic/Immunologic: Negative  Neurological: Negative  Psychiatric/Behavioral: Negative  Historical Information   Past Medical History:   Diagnosis Date    ABPA (allergic bronchopulmonary aspergillosis) ([facility] ) 7/8/2012    Acute kidney injury ([facility] ) 9/8/2017    Acute on chronic respiratory failure ([facility] ) 3/4/2017    Last Assessment & Plan:  Formatting of this note might be different from the original  Recurrent with clinical worsening with enlarging PTX, s/p multiple chest tubes, but new fevers 6/19 and worsening leukocytosis indicating infection as cause of 6/19 decompensation with profound hypoxia in setting of shunting through right lung and continued pneumothorax s/p 2nd chest tube placement    6/27: Right    Anaphylaxis 6/6/2022    Asthma     Chest pain 9/21/2021    Chronic pain     [facility] 9/7/2021    COVID-19 6/3/2022    Cystic fibrosis ([facility] )     Diabetes mellitus (HCC)     GERD (gastroesophageal reflux disease)     HBP (high blood pressure)     HCAP (healthcare-associated pneumonia) 3/6/2020    Influenza B 2/23/2020    Kidney stone     Neutropenic fever ([facility] ) 9/26/2020    Pneumonia due to Pseudomonas species ([facility] ) 10/28/2010    Last Assessment & Plan:  Formatting of this note might be different from the original  Polymicrobial pna w/ pseudomonas, M  Abscessus, Cadida glabrata/albicans    PONV (postoperative nausea and vomiting)     Pseudomonas aeruginosa infection 9/20/2012    Transplant recipient     Lung- 2013, 2017     Past Surgical History:   Procedure Laterality Date    APPENDECTOMY      AUGMENTATION MAMMAPLASTY Bilateral 2004    AUGMENTATION MAMMAPLASTY Right 2021    removed     BREAST SURGERY      Aug      BRONCHOSCOPY      CHOLECYSTECTOMY      COLONOSCOPY      EGD  07/2019    Yeast Infect       FL RETROGRADE PYELOGRAM  1/9/2020    FL RETROGRADE PYELOGRAM  2/4/2020    GASTROSTOMY TUBE, PLACE      IR PORT PLACEMENT  3/22/2022    IR PORT REMOVAL  1/10/2022    IR TUNNELED CENTRAL LINE PLACEMENT  1/13/2022    LUNG SURGERY      LUNG TRANSPLANT      X2    MT CYSTO/URETERO W/LITHOTRIPSY &INDWELL STENT INSRT Right 2/4/2020    Procedure: CYSTOSCOPY URETEROSCOPY WITH LITHOTRIPSY HOLMIUM LASER, RETROGRADE PYELOGRAM AND INSERTION STENT URETERAL;  Surgeon: Karina Yip MD;  Location: [facility];  Service: Urology    MT CYSTOURETHROSCOPY,URETER CATHETER Right 1/9/2020    Procedure: CYSTOSCOPY RETROGRADE PYELOGRAM WITH INSERTION STENT URETERAL;  Surgeon: Karina Yip MD;  [facility];  Service: Urology   [address] [place] [address]  1/17/2017     Social History   Social History     Substance and Sexual Activity   Alcohol Use Yes    Comment: social     Social History     Substance and Sexual Activity   Drug Use Yes    Frequency: 2 0 times per week    Types: Marijuana    Comment: medical edible marijuana prescribed     E-Cigarette/Vaping    E-Cigarette Use Never User E-Cigarette/Vaping Substances     Social History     Tobacco Use   Smoking Status Former Smoker    Packs/day: 0 50    Years: 12 00    Pack years: 6 00   Smokeless Tobacco Never Used     Occupational History:     Family History:   Family History   Problem Relation Age of Onset    Alcohol abuse Mother     Mental illness Mother     Alcohol abuse Father     COPD Maternal Grandmother     Lung cancer Maternal Grandmother 72    COPD Maternal Grandfather     COPD Paternal Grandmother     Lung cancer Paternal Grandmother 72    No Known Problems Sister     No Known Problems Paternal Aunt     Breast cancer Neg Hx     Ovarian cancer Neg Hx     Colon cancer Neg Hx        Meds/Allergies   all current active meds have been reviewed, pertinent pulmonary meds have been reviewed and current meds:   Current Facility-Administered Medications   Medication Dose Route Frequency    acetaminophen (TYLENOL) tablet 650 mg  650 mg Oral Q6H PRN    albuterol inhalation solution 2 5 mg  2 5 mg Nebulization BID    apixaban (ELIQUIS) tablet 5 mg  5 mg Oral BID    [START ON 9/14/2022] atovaquone (MEPRON) oral suspension 1,500 mg  1,500 mg Oral Daily With Breakfast    benzonatate (TESSALON PERLES) capsule 100 mg  100 mg Oral TID PRN    HYDROmorphone (DILAUDID) injection 1 mg  1 mg Intravenous Q4H PRN    insulin lispro (HumaLOG) 100 units/mL subcutaneous injection 1-5 Units  1-5 Units Subcutaneous HS    insulin lispro (HumaLOG) 100 units/mL subcutaneous injection 1-6 Units  1-6 Units Subcutaneous TID AC    linezolid (ZYVOX) IVPB (premix in dextrose) 600 mg 300 mL  600 mg Intravenous Q12H    oxyCODONE (ROXICODONE) immediate release tablet 10 mg  10 mg Oral Q4H PRN    oxyCODONE (ROXICODONE) IR tablet 5 mg  5 mg Oral Q4H PRN    pancrelipase (Lip-Prot-Amyl) (CREON) delayed release capsule 48,000 Units  48,000 Units Oral TID With Meals    pantoprazole (PROTONIX) EC tablet 40 mg  40 mg Oral Early Morning    piperacillin-tazobactam (ZOSYN) 4 5 g in sodium chloride 0 9 % 100 mL IVPB  4 5 g Intravenous Q6H    posaconazole (NOXAFIL) oral suspension 200 mg  200 mg Oral TID    predniSONE tablet 10 mg  10 mg Oral Daily    pregabalin (LYRICA) capsule 100 mg  100 mg Oral BID    sodium chloride 0 9 % infusion  125 mL/hr Intravenous Continuous    valACYclovir (VALTREX) tablet 500 mg  500 mg Oral Daily       Allergies   Allergen Reactions    Bebtelovimab Anaphylaxis    Vancomycin Angioedema and Hives    Gabapentin Hallucinations    Ceftazidime Headache     Severe headaches after desensitization    Nsaids Other (See Comments)     Lung transplant increases risk of renal toxicity, call lung txp provider to discuss if needed       Objective   Vitals: Blood pressure 126/73, pulse 76, temperature 98 3 °F (36 8 °C), resp  rate 20, height 5' 2 5" (1 588 m), weight 50 2 kg (110 lb 10 7 oz), SpO2 95 %, not currently breastfeeding  ,Body mass index is 19 92 kg/m²  Intake/Output Summary (Last 24 hours) at 9/13/2022 1150  Last data filed at 9/13/2022 0601  Gross per 24 hour   Intake 3830 ml   Output 1550 ml   Net 2280 ml     Invasive Devices  Report    Central Venous Catheter Line  Duration           Port A Cath 03/22/22 Right Internal jugular 174 days          Drain  Duration           Ureteral Drain/Stent Right ureter 6 Fr  951 days                Physical Exam  Vitals reviewed  Constitutional:       General: She is not in acute distress  Appearance: Normal appearance  She is not ill-appearing  HENT:      Head: Normocephalic and atraumatic  Mouth/Throat:      Pharynx: Oropharynx is clear  Eyes:      Conjunctiva/sclera: Conjunctivae normal    Cardiovascular:      Rate and Rhythm: Normal rate and regular rhythm  Pulmonary:      Effort: Pulmonary effort is normal       Comments: Absent breath sounds on right, occasional rhonchi on left  Abdominal:      General: Abdomen is flat  There is no distension  Musculoskeletal:         General: Normal range of motion  Cervical back: Normal range of motion  Right lower leg: No edema  Left lower leg: No edema  Skin:     General: Skin is warm and dry  Neurological:      Mental Status: She is alert and oriented to person, place, and time  Psychiatric:         Mood and Affect: Mood normal          Behavior: Behavior normal          Lab Results:   I have personally reviewed pertinent lab results  , CBC:   Lab Results   Component Value Date    WBC 3 61 (L) 09/13/2022    HGB 8 2 (L) 09/13/2022    HCT 27 1 (L) 09/13/2022    MCV 92 09/13/2022    PLT 63 (L) 09/13/2022    MCH 27 7 09/13/2022    MCHC 30 3 (L) 09/13/2022    RDW 16 1 (H) 09/13/2022    MPV 11 3 09/13/2022    NRBC 0 09/12/2022   , CMP:   Lab Results   Component Value Date    SODIUM 141 09/13/2022    K 4 6 09/13/2022     09/13/2022    CO2 21 09/13/2022    BUN 22 09/13/2022    CREATININE 1 18 09/13/2022    CALCIUM 7 9 (L) 09/13/2022    AST 11 09/12/2022    ALT 38 09/12/2022    ALKPHOS 73 09/12/2022    EGFR 56 09/13/2022     Imaging Studies: I have personally reviewed pertinent reports  and I have personally reviewed pertinent films in PACS  EKG, Pathology, and Other Studies: I have personally reviewed pertinent reports      VTE Prophylaxis: Sequential compression device Brent Brown)     Code Status: Level 1 - Full Code  Advance Directive and Living Will:      Power of :    POLST:

## 2022-09-13 NOTE — ASSESSMENT & PLAN NOTE
Lab Results   Component Value Date    HGBA1C 9 1 (H) 08/25/2022       No results for input(s): POCGLU in the last 72 hours      Blood Sugar Average: Last 72 hrs:   continue home insulin regimen

## 2022-09-13 NOTE — PROGRESS NOTES
[address] [facility], [place]  Progress Note Thetamanna Alejandre 1975, 42 y o  female MRN: [id number]  Unit/Bed#: -01 Encounter: [id number]  Primary Care Provider: Loyd Quinonez MD   Date and time admitted to hospital: 9/12/2022  5:28 PM    * [address]  Patient with history of cystic fibrosis and pneumonectomy, lung transplant initially presented with shortness breath cough found to have bronchopneumonia ER discuss with patient's pulmonologist at [facility] recommended IV antibiotics and possible transfer tomorrow  Currently and at capacity  Recommended holding Tacker limits for now given GEOFF  Continue home steroids and anti fungals  Was recently discharged from their  Dr  Ajith Wright is patient's doctor from [facility]  He recommended IV Zosyn and linezolid  Will consult pulmonology and ID, follow-up recs    DVT (deep venous thrombosis) ([facility] )  Assessment & Plan  Continue Eliquis    Pancreatic insufficiency  Assessment & Plan  Continue pancrelipase    Type 1 diabetes [facility])  Assessment & Plan  Lab Results   Component Value Date    HGBA1C 9 1 (H) 08/25/2022       Recent Labs     09/12/22  2208 09/13/22  0749 09/13/22  1104   POCGLU 167* 169* 176*       Blood Sugar Average: Last 72 hrs:  (P) 630 [id number]     Continue sliding scale insulin    S/P pneumonectomy  Assessment & Plan  Noted history      VTE Pharmacologic Prophylaxis:   Pharmacologic: Apixaban (Eliquis)  Mechanical VTE Prophylaxis in Place: Yes    Patient Centered Rounds: I have performed bedside rounds with nursing staff today  Discussions with Specialists or Other Care Team Provider: cm, nursing    Education and Discussions with Family / Patient: pt    Time Spent for Care: 30 minutes  More than 50% of total time spent on counseling and coordination of care as described above      Current Length of Stay: 1 day(s)    Current Patient Status: Inpatient   Certification Statement: The patient will continue to require additional inpatient hospital stay due to see below    Discharge Plan:  Pending transfer to [facility]    Code Status: Level 1 - Full Code      Subjective:   Denies shortness of breath, cough, fevers, chills    Objective:     Vitals:   Temp (24hrs), Av 5 °F (36 9 °C), Min:98 1 °F (36 7 °C), Max:99 1 °F (37 3 °C)    Temp:  [98 1 °F (36 7 °C)-99 1 °F (37 3 °C)] 98 3 °F (36 8 °C)  HR:  [] 76  Resp:  [20-30] 20  BP: (124-148)/(73-91) 126/73  SpO2:  [94 %-97 %] 95 %  Body mass index is 19 92 kg/m²  Input and Output Summary (last 24 hours): Intake/Output Summary (Last 24 hours) at 2022 1140  Last data filed at 2022 0601  Gross per 24 hour   Intake 3830 ml   Output 1550 ml   Net 2280 ml       Physical Exam:     Physical Exam  Constitutional:       General: She is not in acute distress  Appearance: She is well-developed  She is not diaphoretic  HENT:      Head: Normocephalic and atraumatic  Nose: Nose normal       Mouth/Throat:      Pharynx: No oropharyngeal exudate  Eyes:      General: No scleral icterus  Right eye: No discharge  Left eye: No discharge  Conjunctiva/sclera: Conjunctivae normal    Neck:      Thyroid: No thyromegaly  Vascular: No JVD  Cardiovascular:      Rate and Rhythm: Normal rate and regular rhythm  Heart sounds: Normal heart sounds  No murmur heard  No friction rub  No gallop  Pulmonary:      Effort: Pulmonary effort is normal  No respiratory distress  Breath sounds: Normal breath sounds  No wheezing or rales  Chest:      Chest wall: No tenderness  Abdominal:      General: Bowel sounds are normal  There is no distension  Palpations: Abdomen is soft  Tenderness: There is no abdominal tenderness  There is no guarding or rebound  Musculoskeletal:         General: No tenderness or deformity  Normal range of motion  Cervical back: Normal range of motion and neck supple     Skin:     General: Skin is warm and dry       Findings: No erythema or rash  Neurological:      Mental Status: She is alert  Mental status is at baseline  Cranial Nerves: No cranial nerve deficit  Sensory: No sensory deficit  Motor: No abnormal muscle tone  Coordination: Coordination normal            Additional Data:     Labs:    Results from last 7 days   Lab Units 09/13/22  0603 09/12/22  1819   WBC Thousand/uL 3 61* 4 95   HEMOGLOBIN g/dL 8 2* 10 5*   HEMATOCRIT % 27 1* 34 1*   PLATELETS Thousands/uL 63* 80*   NEUTROS PCT %  --  73   LYMPHS PCT %  --  24   MONOS PCT %  --  3*   EOS PCT %  --  0     Results from last 7 days   Lab Units 09/13/22  0603 09/12/22  1819   SODIUM mmol/L 141 138   POTASSIUM mmol/L 4 6 5 0   CHLORIDE mmol/L 108 103   CO2 mmol/L 21 20*   BUN mg/dL 22 31*   CREATININE mg/dL 1 18 1 67*   ANION GAP mmol/L 12 15*   CALCIUM mg/dL 7 9* 8 9   ALBUMIN g/dL  --  2 9*   TOTAL BILIRUBIN mg/dL  --  0 28   ALK PHOS U/L  --  73   ALT U/L  --  38   AST U/L  --  11   GLUCOSE RANDOM mg/dL 193* 410*     Results from last 7 days   Lab Units 09/12/22  1819   INR  0 97     Results from last 7 days   Lab Units 09/13/22  1104 09/13/22  0749 09/12/22  2208   POC GLUCOSE mg/dl 176* 169* 167*         Results from last 7 days   Lab Units 09/13/22  0603 09/13/22  0343 09/13/22  0133 09/12/22  2229 09/12/22  2056 09/12/22  1819   LACTIC ACID mmol/L 2 8* 2 9* 3 0* 3 8*   < > 5 3*   PROCALCITONIN ng/ml 0 39*  --   --  0 49*  --  0 59*    < > = values in this interval not displayed  * I Have Reviewed All Lab Data Listed Above  * Additional Pertinent Lab Tests Reviewed: All Labs Within Last 24 Hours Reviewed    Imaging:    Imaging Reports Reviewed Today Include: na  Imaging Personally Reviewed by Myself Includes:  na    Recent Cultures (last 7 days):     Results from last 7 days   Lab Units 09/12/22  1830 09/12/22  1819   BLOOD CULTURE  Received in Microbiology Lab  Culture in Progress  Received in Microbiology Lab   Culture in Progress  Last 24 Hours Medication List:   Current Facility-Administered Medications   Medication Dose Route Frequency Provider Last Rate    acetaminophen  650 mg Oral Q6H PRN Sai Serrano MD      apixaban  5 mg Oral BID Sai Serrano MD      [START ON 9/14/2022] atovaquone  1,500 mg Oral Daily With Breakfast Todd Carpenter MD      benzonatate  100 mg Oral TID PRN Fabian Hopper MD      HYDROmorphone  1 mg Intravenous Q4H PRN Sai Serrano MD      insulin lispro  1-5 Units Subcutaneous HS Sai Serrano MD      insulin lispro  1-6 Units Subcutaneous TID AC Sai Serrano MD      linezolid  600 mg Intravenous Q12H Sai Serrano MD      oxyCODONE  10 mg Oral Q4H PRN Sai Serrano MD      oxyCODONE  5 mg Oral Q4H PRN Sai Serrano MD      pancrelipase (Lip-Prot-Amyl)  48,000 Units Oral TID With Meals Diandra Daugherty PA-C      pantoprazole  40 mg Oral Early Morning Sai Serrano MD      piperacillin-tazobactam  4 5 g Intravenous Q6H Sai Serrano MD 4 5 g (09/13/22 0350)    predniSONE  10 mg Oral Daily Sai Serrano MD      pregabalin  100 mg Oral BID Sai Serrano MD      sodium chloride  125 mL/hr Intravenous Continuous Sai Serrano  mL/hr (09/13/22 0502)    valACYclovir  500 mg Oral Daily Fabian Hopper MD          Today, Patient Was Seen By: Fabian Hopper MD    ** Please Note: Dictation voice to text software may have been used in the creation of this document   **

## 2022-09-13 NOTE — UTILIZATION REVIEW
Inpatient Admission Authorization Request   NOTIFICATION OF INPATIENT ADMISSION/INPATIENT AUTHORIZATION REQUEST   SERVICING FACILITY:   [address] [place] [address]  Tax ID: [id number]  NPI: [id number]  Place of Service: Inpatient [address] [facility]  [address]  Place of Service Code: 24     ATTENDING PROVIDER:  Attending Name and NPI#: Jaziel Sequeira [place] [[id number]]  Address: [address] [place] [address]  Phone: [phone]     UTILIZATION REVIEW CONTACT:  Kermit Sapp Utilization   Network Utilization Review Department  Phone: [phone]  Fax [phone]  Email: [email or web address]     PHYSICIAN ADVISORY SERVICES:  FOR [id number] REVIEW - MEDICAL NECESSITY DENIAL  Phone: [phone]  Fax: [phone]  Email: [email or web address]     TYPE OF REQUEST:  Inpatient Status     ADMISSION INFORMATION:  ADMISSION DATE/TIME: 9/12/22  9:16 PM  PATIENT DIAGNOSIS CODE/DESCRIPTION:  Pneumonia [J18 9]  SOB (shortness of breath) [R06 02]  GEOFF (acute kidney injury) ([facility] [address] ) [N17 9]  DISCHARGE DATE/TIME: No discharge date for patient encounter  IMPORTANT INFORMATION:  Please contact Kermit Sapp directly with any questions or concerns regarding this request  Department voicemails are confidential     Send requests for admission clinical reviews, concurrent reviews, approvals, and administrative denials due to lack of clinical to fax [phone]

## 2022-09-13 NOTE — TELEPHONE ENCOUNTER
----- Message from Vonnie Carlisle RN sent at 9/13/2022  8:54 AM EDT -----  Regarding: FW: Endoscopy cancel    ----- Message -----  From: Sydnie Roberts  Sent: 9/12/2022   6:03 PM EDT  To: [address] [facility]  Subject: Endoscopy cancel                                 Hi Dr Kareen Vogel,     I need to cancel my endoscopy for this Wednesday, September 15  I was recently discharged from [facility] for Covid complications, I was there for two weeks and sent home  I never started to feel better at home so I called my doctor and I am now in [facility] trying to get everything sorted out  Please get in touch with me so we can reschedule       Many thanks,   E  JAIME Jenkins

## 2022-09-13 NOTE — ASSESSMENT & PLAN NOTE
Lab Results   Component Value Date    HGBA1C 9 1 (H) 08/25/2022       Recent Labs     09/12/22 2208 09/13/22  0749 09/13/22  1104   POCGLU 167* 169* 176*       Blood Sugar Average: Last 72 hrs:  (P) 535 [id number]     Continue sliding scale insulin

## 2022-09-13 NOTE — ASSESSMENT & PLAN NOTE
Patient with history of cystic fibrosis and pneumonectomy, lung transplant initially presented with shortness breath cough found to have bronchopneumonia ER discuss with patient's pulmonologist at [facility] recommended IV antibiotics and possible transfer tomorrow    Currently and at capacity  Recommended holding Tacker limits for now given GEOFF  Continue home steroids and anti fungals  Was recently discharged from their  Dr  Subhash Bernabe is patient's doctor from [facility]  He recommended IV Zosyn and linezolid  Will consult pulmonology and ID, follow-up recs

## 2022-09-13 NOTE — CASE MANAGEMENT
Case Management Assessment & Discharge Planning Note    Patient name Penny Goins  Location [address] 324/-41 MRN [id number]  : 1975 Date 2022       Current Admission Date: 2022  Current Admission Diagnosis:Bronchopneumonia   Patient Active Problem List    Diagnosis Date Noted    Bronchopneumonia 2022    Cough 2022    Renal calculus 2022    Nodule of left lung 2022    Rib pain 2022    Abnormal uterine bleeding (AUB) 2022    Gastroparesis 2022    [facility]ol[facility]     Neuropathic pain of right foot 01/10/2022    Moderate protein-calorie malnutrition ([facility] ) 2022    Weakness of right lower extremity 2021    Jejunostomy tube present ([facility] [address] ) 10/28/2021    Pulmonary hypertension ([facility] ) 2021    Cystic fibrosis ([facility] ) 2021    Essential hypertension 2021    Hypercholesteremia 2021    Long term current use of bisphosphonates 2020    Long term current use of systemic steroids 2020    Osteoporosis 2020    Chronic pain disorder 2020    Asthma 2020    CKD (chronic kidney disease) stage 2, GFR 60-89 ml/min 2019    Hiatal hernia 2019    Acute on chronic anemia 2018    DVT (deep venous thrombosis) ([facility] ) 2018    Severe protein-calorie malnutrition ([facility] ) 2018    Immunosuppression ([facility] ) 2017    Transplant rejection 2016    Colon polyp 2016    S/P pneumonectomy 2013    Wellness examination 2013    Anxiety 2012    Type 1 diabetes ([facility] ) 10/28/2010    Pancreatic insufficiency 10/28/2010    Chronic sinusitis 10/28/2010    Esophageal reflux 10/28/2010      LOS (days): 1  Geometric Mean LOS (GMLOS) (days):   Days to GMLOS:     OBJECTIVE:    Risk of Unplanned Readmission Score: 19 41      Current admission status: Inpatient     Preferred Pharmacy:   [facility] [place] [facility] [address] [facility]  Phone: [phone] Fax: [address] [place], [place] - [address]  Salud Jones [address] [place] [address]  Phone: [phone] Fax: [phone]    [facility] [address]  [place]  [address]   [facility] [address]  Phone: [phone] Fax: [phone]    Primary Care Provider: Natalie Fonseca MD    Primary Insurance: BLUE CROSS  Secondary Insurance: MEDICARE    ASSESSMENT:  [address], [address] Representative - Spouse   Primary Phone: [phone] (Mobile)               Advance Directives  Does patient have a [address]?: Yes  Does patient have Advance Directives?: Yes  Advance Directives: Living will    Readmission Root Cause  30 Day Readmission: No    Patient Information  Admitted from[de-identified] Home  Mental Status: Alert  During Assessment patient was accompanied by: Spouse (CM spoke with the patients  by phone)  Assessment information provided by[de-identified] Spouse  Primary Caregiver: Self  Support Systems: Self, Spouse/significant other  [place] of Residence: [place] [address] do you live in?: [place]  Type of Current Residence: [facility] ([place])  Living Arrangements: Lives w/ Spouse/significant other  Is patient a ?: No    Activities of Daily Living Prior to Admission  Functional Status: Independent  Completes ADLs independently?: Yes  Ambulates independently?: Yes  Does patient use assisted devices?: No  Does patient currently own DME?: No  Does patient have a history of Outpatient Therapy (PT/OT)?: No  Does the patient have a history of Short-Term Rehab?: No  Does patient have a history of HHC?: No  Does patient currently have [address]?: No    Patient Information Continued  Does patient have prescription coverage?: Yes  Does patient receive dialysis treatments?: No  Does patient have a history of substance abuse?: No  Does patient have a history of Mental Health Diagnosis?: No    Means of Transportation  Means of Transport to Appts[de-identified] Drives Self    DISCHARGE DETAILS:    Discharge planning discussed with[de-identified] The patient  Fabiola Height of Choice: Yes  Comments - Freedom of Choice: CM met with the patient at the bedside to complete the initial assessment  However, the patient is on contact & droplet precautions  CM not able to contact the patient by phone  CM notified the patient's  Ra Salcido by phone to complete the assessment  The patient was admitted to the hospital for Pneumonia & GEOFF  Per the , the patient is oriented x4 able to make needs known to staff  The patients demographic sheet was verified  The [place]  and able to complete her ADLs and IADLs  The patient has received her COVID Vaccines x 2 doses and 2 booster  The patients discharge disposition will be to return home with self care and family support  The patients  will provide transportation home at discharge  CM will continue to follow    CM contacted family/caregiver?: Yes  Were Treatment Team discharge recommendations reviewed with patient/caregiver?: No (No discharge recommendations have been noted at this time )  Did patient/caregiver verbalize understanding of patient care needs?: Yes       Contacts  Patient Contacts: Dora Caro-   Relationship to Patient[de-identified] Family  Contact Method: Phone  Phone Number: [phone]  Reason/Outcome: Continuity of Care, Emergency [address]         Is the patient interested in SimpliVTayad Springer at discharge?: No    DME Referral Provided  Referral made for DME?: No

## 2022-09-13 NOTE — RESPIRATORY THERAPY NOTE
RT Protocol Note  Jana Fortune 42 y o  female MRN: [id number]  Unit/Bed#: ED 14 Encounter: [id number]    Assessment    Principal Problem:    Bronchopneumonia  Active Problems:    Type 1 diabetes ([facility] [address] )    Pancreatic insufficiency    DVT (deep venous thrombosis) [facility])      Home Pulmonary Medications:     09/12/22 2139   Respiratory Protocol   Protocol Initiated? Yes   Protocol Selection Respiratory   Language Barrier? No   Medical & Social History Reviewed? Yes   Diagnostic Studies Reviewed? Yes   Physical Assessment Performed? Yes   Home Devices/Therapy Other (Comment)  (NONE)   Respiratory Plan Home Bronchodilator Patient pathway   Respiratory Assessment   General Appearance Alert; Awake   Respiratory Pattern Normal   Chest Assessment Chest expansion asymmetrical   Bilateral Breath Sounds Diminished;Coarse   R Breath Sounds Diminished;Coarse   L Breath Sounds Diminished;Coarse   Cough Non-productive   Resp Comments Pt resting watching tv  Pt uses albuterol inhaler when need at home  Pt does not wear O2 at home nor does she use cpap or bipap machines  Pt states she only has 1 lung on the left side  O2 Device RA   Additional Assessments   Pulse 95   Respirations (!) 30   SpO2 94 %     Home Devices/Therapy: Other (Comment) (NONE)    Past Medical History:   Diagnosis Date    ABPA (allergic bronchopulmonary aspergillosis) ([facility] [address] ) 7/8/2012    Acute kidney injury ([facility] ) 9/8/2017    Acute on chronic respiratory failure ([facility] ) 3/4/2017    Last Assessment & Plan:  Formatting of this note might be different from the original  Recurrent with clinical worsening with enlarging PTX, s/p multiple chest tubes, but new fevers 6/19 and worsening leukocytosis indicating infection as cause of 6/19 decompensation with profound hypoxia in setting of shunting through right lung and continued pneumothorax s/p 2nd chest tube placement    6/27: Right    Anaphylaxis 6/6/2022    Asthma     Chest pain 9/21/2021    Chronic pain     Chronic respiratory failure with hypoxia ([facility] ) 2021    COVID-19 6/3/2022    Cystic fibrosis ([facility] )     Diabetes mellitus (HCC)     GERD (gastroesophageal reflux disease)     HBP (high blood pressure)     HCAP (healthcare-associated pneumonia) 3/6/2020    Influenza B 2020    Kidney stone     Neutropenic fever ([facility] ) 2020    Pneumonia due to Pseudomonas species ([facility] ) 10/28/2010    Last Assessment & Plan:  Formatting of this note might be different from the original  Polymicrobial pna w/ pseudomonas, M   Abscessus, Cadida glabrata/albicans    PONV (postoperative nausea and vomiting)     Pseudomonas aeruginosa infection 2012    Transplant recipient     Lung- ,      Social History     Socioeconomic History    Marital status: /Civil Union     Spouse name: None    Number of children: None    Years of education: 12    Highest education level: None   Occupational History    Occupation: Disabled    Tobacco Use    Smoking status: Former Smoker     Packs/day: 0 50     Years: 12 00     Pack years: 6 00    Smokeless tobacco: Never Used   Vaping Use    Vaping Use: Never used   Substance and Sexual Activity    Alcohol use: Yes     Comment: social    Drug use: Yes     Frequency: 2 0 times per week     Types: Marijuana     Comment: medical edible marijuana prescribed    Sexual activity: Yes     Partners: Male     Birth control/protection: None, Injection   Other Topics Concern    None   Social History Narrative    Most recent tobacco use screenin2019    Do you currently or have you served in the [employer]: No    Were you activated, into active duty, as a member of the [employer] or as a Reservist: No    Occupation: disabled    Education: 12    Marital status:     Exercise level: Occasional    Diet: Specific    low sugar, high fat    General stress level: High    Alcohol intake: Occasional    Caffeine intake: Heavy    Chewing tobacco: none    Illicit drugs: none    Guns present in home: No    Seat belts used routinely: Yes    Smoke alarm in home: Yes    Advance directive: Yes     Social Determinants of Health     Financial Resource Strain: Not on file   Food Insecurity: No Food Insecurity    Worried About Running Out of Food in the Last Year: Never true    Ran Out of Food in the Last Year: Never true   Transportation Needs: No Transportation Needs    Lack of Transportation (Medical): No    Lack of Transportation (Non-Medical): No   Physical Activity: Not on file   Stress: Not on file   Social Connections: Not on file   Intimate Partner Violence: Not on file   Housing Stability: Low Risk     Unable to Pay for Housing in the Last Year: No    Number of Places Lived in the Last Year: 1    Unstable Housing in the Last Year: No       Subjective         Objective    Physical Exam:   General Appearance: Alert, Awake  Respiratory Pattern: Normal  Chest Assessment: Chest expansion asymmetrical  Bilateral Breath Sounds: Diminished, Coarse  R Breath Sounds: Diminished, Coarse  L Breath Sounds: Diminished, Coarse  Cough: Non-productive  O2 Device: RA    Vitals:  Blood pressure 134/91, pulse 95, temperature 98 1 °F (36 7 °C), resp  rate (!) 30, SpO2 94 %, not currently breastfeeding  Imaging and other studies: I have personally reviewed pertinent reports  O2 Device: RA     Plan    Respiratory Plan: Home Bronchodilator Patient pathway        Resp Comments: Pt resting watching tv  Pt uses albuterol inhaler when need at home  Pt does not wear O2 at home nor does she use cpap or bipap machines  Pt states she only has 1 lung on the left side

## 2022-09-14 LAB — MRSA NOSE QL CULT: NORMAL

## 2022-09-14 NOTE — UTILIZATION REVIEW
Notification of Discharge   This is a Notification of Discharge from our facility [address]  Please be advised that this patient has been discharge from our facility  Below you will find the admission and discharge date and time including the patients disposition  UTILIZATION REVIEW CONTACT:  Agus Moreno  Utilization   Network Utilization Review Department  Phone: [phone] x carefully listen to the prompts  All voicemails are confidential   Email: [email or web address]     PHYSICIAN ADVISORY SERVICES:  FOR [id number] REVIEW - MEDICAL NECESSITY DENIAL  Phone: [phone]  Fax: [phone]  Email: [email or web address]     PRESENTATION DATE: 9/12/2022  5:28 PM  OBERVATION ADMISSION DATE:   INPATIENT ADMISSION DATE: 9/12/22  9:16 PM   DISCHARGE DATE: 9/14/2022  1:25 AM  DISPOSITION: Non [facility] Non [facility]      IMPORTANT INFORMATION:  Send all requests for admission clinical reviews, approved or denied determinations and any other requests to dedicated fax number below belonging to the campus where the patient is receiving treatment   List of dedicated fax numbers:  [address] [place] [address] DENIALS (Administrative/Medical Necessity) [phone]   [address] (Maternity/NICU/Pediatrics) [phone]   Anamaria Reece [phone]   [address] [facility] [phone]   [address] [place] [address] [phone]   [address] [facility] [address] [place] [address],[address] [phone]   [facility]  [phone]   [address] [facility]  [address] [facility] [address] [facility] [phone]

## 2022-09-14 NOTE — PLAN OF CARE
Problem: PAIN - ADULT  Goal: Verbalizes/displays adequate comfort level or baseline comfort level  Description: Interventions:  - Encourage patient to monitor pain and request assistance  - Assess pain using appropriate pain scale  - Administer analgesics based on type and severity of pain and evaluate response  - Implement non-pharmacological measures as appropriate and evaluate response  - Consider cultural and social influences on pain and pain management  - Notify physician/advanced practitioner if interventions unsuccessful or patient reports new pain  Outcome: Progressing     Problem: INFECTION - ADULT  Goal: Absence or prevention of progression during hospitalization  Description: INTERVENTIONS:  - Assess and monitor for signs and symptoms of infection  - Monitor lab/diagnostic results  - Monitor all insertion sites, i e  indwelling lines, tubes, and drains  - Monitor endotracheal if appropriate and nasal secretions for changes in amount and color  - O'Brien appropriate cooling/warming therapies per order  - Administer medications as ordered  - Instruct and encourage patient and family to use good hand hygiene technique  - Identify and instruct in appropriate isolation precautions for identified infection/condition  Outcome: Progressing  Goal: Absence of fever/infection during neutropenic period  Description: INTERVENTIONS:  - Monitor WBC    Outcome: Progressing     Problem: SAFETY ADULT  Goal: Patient will remain free of falls  Description: INTERVENTIONS:  - Educate patient/family on patient safety including physical limitations  - Instruct patient to call for assistance with activity   - Consult OT/PT to assist with strengthening/mobility   - Keep Call bell within reach  - Keep bed low and locked with side rails adjusted as appropriate  - Keep care items and personal belongings within reach  - Initiate and maintain comfort rounds  - Make Fall Risk Sign visible to staff  - Offer Toileting every 2 Hours, in advance of need  - Initiate/Maintain Bed alarm  - Obtain necessary fall risk management equipment: Bed Alarm  - Apply yellow socks and bracelet for high fall risk patients  - Consider moving patient to room near nurses station  Outcome: Progressing  Goal: Maintain or return to baseline ADL function  Description: INTERVENTIONS:  -  Assess patient's ability to carry out ADLs; assess patient's baseline for ADL function and identify physical deficits which impact ability to perform ADLs (bathing, care of mouth/teeth, toileting, grooming, dressing, etc )  - Assess/evaluate cause of self-care deficits   - Assess range of motion  - Assess patient's mobility; develop plan if impaired  - Assess patient's need for assistive devices and provide as appropriate  - Encourage maximum independence but intervene and supervise when necessary  - Involve family in performance of ADLs  - Assess for home care needs following discharge   - Consider OT consult to assist with ADL evaluation and planning for discharge  - Provide patient education as appropriate  Outcome: Progressing  Goal: Maintains/Returns to pre admission functional level  Description: INTERVENTIONS:  - Perform BMAT or MOVE assessment daily    - Set and communicate daily mobility goal to care team and patient/family/caregiver  - Collaborate with rehabilitation services on mobility goals if consulted  - Perform Range of Motion 4 times a day  - Reposition patient every 2 hours    - Dangle patient 4 times a day  - Stand patient 4 times a day  - Ambulate patient 4 times a day  - Out of bed to chair 3 times a day   - Out of bed for meals 3 times a day  - Out of bed for toileting  - Record patient progress and toleration of activity level   Outcome: Progressing     Problem: DISCHARGE PLANNING  Goal: Discharge to home or other facility with appropriate resources  Description: INTERVENTIONS:  - Identify barriers to discharge w/patient and caregiver  - Arrange for needed discharge resources and transportation as appropriate  - Identify discharge learning needs (meds, wound care, etc )  - Arrange for interpretive services to assist at discharge as needed  - Refer to Case Management Department for coordinating discharge planning if the patient needs post-hospital services based on physician/advanced practitioner order or complex needs related to functional status, cognitive ability, or social support system  Outcome: Progressing     Problem: CARDIOVASCULAR - ADULT  Goal: Maintains optimal cardiac output and hemodynamic stability  Description: INTERVENTIONS:  - Monitor I/O, vital signs and rhythm  - Monitor for S/S and trends of decreased cardiac output  - Administer and titrate ordered vasoactive medications to optimize hemodynamic stability  - Assess quality of pulses, skin color and temperature  - Assess for signs of decreased coronary artery perfusion  - Instruct patient to report change in severity of symptoms  Outcome: Progressing  Goal: Absence of cardiac dysrhythmias or at baseline rhythm  Description: INTERVENTIONS:  - Continuous cardiac monitoring, vital signs, obtain 12 lead EKG if ordered  - Administer antiarrhythmic and heart rate control medications as ordered  - Monitor electrolytes and administer replacement therapy as ordered  Outcome: Progressing     Problem: RESPIRATORY - ADULT  Goal: Achieves optimal ventilation and oxygenation  Description: INTERVENTIONS:  - Assess for changes in respiratory status  - Assess for changes in mentation and behavior  - Position to facilitate oxygenation and minimize respiratory effort  - Oxygen administered by appropriate delivery if ordered  - Initiate smoking cessation education as indicated  - Encourage broncho-pulmonary hygiene including cough, deep breathe, Incentive Spirometry  - Assess the need for suctioning and aspirate as needed  - Assess and instruct to report SOB or any respiratory difficulty  - Respiratory Therapy support as indicated  Outcome: Progressing     Problem: Knowledge Deficit  Goal: Patient/family/caregiver demonstrates understanding of disease process, treatment plan, medications, and discharge instructions  Description: Complete learning assessment and assess knowledge base    Interventions:  - Provide teaching at level of understanding  - Provide teaching via preferred learning methods  Outcome: Progressing

## 2022-09-14 NOTE — NURSING NOTE
Pt prepared for transport, AVS Notes given  Port accessed for [employer], and medications given  Pt is stable, pain meds given prior to transport  Report given to Nurse

## 2022-09-16 ENCOUNTER — TELEPHONE (OUTPATIENT)
Dept: GASTROENTEROLOGY | Facility: CLINIC | Age: 47
End: 2022-09-16

## 2022-09-16 NOTE — TELEPHONE ENCOUNTER
Patients GI provider:  Dr Michael Byrd    Number to return call: [phone]    Reason for call: [place] from [facility] Sariah schofield PA called requesting to speak directly to Dr Michael Byrd  Pt is currently there with pneumonia and she wanted to touch base with him about her care  Above is her number       Scheduled procedure/appointment date if applicable: Apt/procedure NA

## 2022-09-16 NOTE — ED PROVIDER NOTES
History  Chief Complaint   Patient presents with    Shortness of Breath     Patient recently discharged from [facility] for covid  Patient has a history of cystic fibrosis and 1 lung  Patient sent to ER for PE study  44-year-old female presenting to emergency department for evaluation of shortness of breath  Patient has history of cystic fibrosis, and is a lung transplant recipient  Patient has 1 left lung  She recently was discharged from [facility], where her pulmonologist Practice, for Bhumi  Patient states that since discharge she has had worsening symptoms of chest tightness, productive cough, shortness of breath, generalized fatigue  She called her doctor today and was particularly concerned about potential pulmonary embolism and was referred to the emergency department specifically for this  She has had appetite change, she denies any nausea or vomiting, she has abdominal pain, she denies is changes in bowel or bladder habits  She has no chest pain palpitations syncope or presyncope otherwise  Prior to Admission Medications   Prescriptions Last Dose Informant Patient Reported? Taking?    ALPRAZolam (XANAX) 1 mg tablet 9/11/2022 at Unknown time Self No Yes   Sig: Take 1 tablet (1 mg total) by mouth daily at bedtime as needed for anxiety or sleep   Respiratory Therapy Supplies (Nebulizer) SUKHI 9/12/2022 at Unknown time Self Yes Yes   Sig: Please dispense nebulizer machine   acetaminophen (TYLENOL) 325 mg tablet Past Month at Unknown time Self No Yes   Sig: Take 3 tablets (975 mg total) by mouth every 8 (eight) hours   albuterol (2 5 mg/3 mL) 0 083 % nebulizer solution 9/12/2022 at Unknown time Self No Yes   Sig: Take 6 mL (5 mg total) by nebulization 2 (two) times a day   apixaban (ELIQUIS) 5 mg Past Month at Unknown time Self No Yes   Sig: Take 1 tablet (5 mg total) by mouth 2 (two) times a day   atovaquone (MEPRON) 750 mg/5 mL suspension 9/12/2022 at Unknown time Self Yes Yes   Sig: Take 1,500 mg by mouth daily   cyclobenzaprine (FLEXERIL) 5 mg tablet More than a month at Unknown time Self No No   Sig: Take 1-2 tabs every 8 hours as needed for muscle pain/spasm   dexlansoprazole (DEXILANT) 60 MG capsule 2022 at Unknown time Self No Yes   Sig: Take 1 capsule (60 mg total) by mouth in the morning  dronabinol (MARINOL) 5 MG capsule Past Month at Unknown time  No Yes   Sig: TAKE 1 CAPSULE (5 MG TOTAL) BY MOUTH 3 (THREE) TIMES A DAY   Patient taking differently: Take 5 mg by mouth 3 (three) times a day as needed   famotidine (PEPCID) 40 MG tablet Past Week at Unknown time Self No Yes   Sig: TAKE 1 TABLET BY MOUTH EVERYDAY AT BEDTIME   hydrocodone-chlorpheniramine polistirex (TUSSIONEX) 10-8 mg/5 mL ER suspension Not Taking at Unknown time Self No No   Sig: Take 5 mL by mouth every 12 (twelve) hours as needed for cough Max Daily Amount: 10 mL   Patient not taking: No sig reported   insulin lispro (HumaLOG) 100 units/mL injection 2022 at Unknown time  No Yes   Sig: Inject 4 Units under the skin 3 (three) times a day with meals   Patient taking differently: Inject under the skin 3 (three) times a day with meals Sliding scale   medroxyPROGESTERone (DEPO-PROVERA) 150 mg/mL injection Past Month at Unknown time Self No Yes   Sig: Inject 1 mL (150 mg total) into a muscle every 3 (three) months   metoclopramide (REGLAN) 10 mg tablet Not Taking at Unknown time Self No No   Sig: TAKE 1 TABLET BY MOUTH 3 TIMES A DAY WITH MEALS     Patient not taking: Reported on 2022   naloxone (NARCAN) 4 mg/0 1 mL nasal spray Not Taking at Unknown time Self Yes No   Si mg into each nostril   Patient not taking: Reported on 2022   nystatin (MYCOSTATIN) 500,000 units/5 mL suspension Not Taking at Unknown time  No No   Sig: Apply 5 mL (500,000 Units total) to the mouth or throat 4 (four) times a day   Patient not taking: No sig reported   pancrelipase, Lip-Prot-Amyl, (CREON) 24,000 units 9/12/2022 at Unknown time Self Yes Yes   Sig: Take 48,000 Units by mouth 3 (three) times a day with meals   predniSONE 10 mg tablet 9/12/2022 at Unknown time Self Yes Yes   Sig: Take 10 mg by mouth daily   pregabalin (Lyrica) 100 mg capsule   No No   Sig: Take 1 capsule (100 mg total) by mouth 2 (two) times a day   sucralfate (CARAFATE) 1 g/10 mL suspension Not Taking at Unknown time Self No No   Sig: Take 10 mL (1 g total) by mouth 4 (four) times a day (with meals and at bedtime)   Patient not taking: Reported on 9/12/2022   tacrolimus (PROGRAF) 1 mg capsule 9/12/2022 at Unknown time Self Yes Yes   Sig: Take 0 5 mg by mouth 2 (two) times a day   traZODone (DESYREL) 50 mg tablet Not Taking at Unknown time  No No   Sig: Take 0 5 tablets (25 mg total) by mouth daily at bedtime   Patient not taking: Reported on 9/12/2022   valACYclovir (VALTREX) 500 mg tablet 9/12/2022 at Unknown time Self Yes Yes   Sig: Take 500 mg by mouth daily      Facility-Administered Medications: None       Past Medical History:   Diagnosis Date    ABPA (allergic bronchopulmonary aspergillosis) ([facility] [address] ) 7/8/2012    Acute kidney injury ([facility] [address] ) 9/8/2017    Acute on chronic respiratory failure ([facility] [address] ) 3/4/2017    Last Assessment & Plan:  Formatting of this note might be different from the original  Recurrent with clinical worsening with enlarging PTX, s/p multiple chest tubes, but new fevers 6/19 and worsening leukocytosis indicating infection as cause of 6/19 decompensation with profound hypoxia in setting of shunting through right lung and continued pneumothorax s/p 2nd chest tube placement    6/27: Right    Anaphylaxis 6/6/2022    Asthma     Chest pain 9/21/2021    Chronic pain     Chronic respiratory failure with hypoxia ([facility] [address] ) 9/7/2021    COVID-19 6/3/2022    Cystic fibrosis (HCC)     Diabetes mellitus (HCC)     GERD (gastroesophageal reflux disease)     HBP (high blood pressure)     HCAP (healthcare-associated pneumonia) 3/6/2020    Influenza B 2/23/2020    Kidney stone     Neutropenic fever ([facility] [address] ) 9/26/2020    Pneumonia due to Pseudomonas species ([facility] [address] ) 10/28/2010    Last Assessment & Plan:  Formatting of this note might be different from the original  Polymicrobial pna w/ pseudomonas, M  Abscessus, Cadida glabrata/albicans    PONV (postoperative nausea and vomiting)     Pseudomonas aeruginosa infection 9/20/2012    Transplant recipient     Lung- 2013, 2017       Past Surgical History:   Procedure Laterality Date    APPENDECTOMY      AUGMENTATION MAMMAPLASTY Bilateral 2004    AUGMENTATION MAMMAPLASTY Right 2021    removed     BREAST SURGERY      Aug      BRONCHOSCOPY      CHOLECYSTECTOMY      COLONOSCOPY      EGD  07/2019    Yeast Infect       FL RETROGRADE PYELOGRAM  1/9/2020    FL RETROGRADE PYELOGRAM  2/4/2020    GASTROSTOMY TUBE, PLACE      IR PORT PLACEMENT  3/22/2022    IR PORT REMOVAL  1/10/2022    IR TUNNELED CENTRAL LINE PLACEMENT  1/13/2022    LUNG SURGERY      LUNG TRANSPLANT      X2    VA CYSTO/URETERO W/LITHOTRIPSY &INDWELL STENT INSRT Right 2/4/2020    Procedure: CYSTOSCOPY URETEROSCOPY WITH LITHOTRIPSY HOLMIUM LASER, RETROGRADE PYELOGRAM AND INSERTION STENT URETERAL;  Surgeon: Jonnathan Dillon MD;  Location: [facility];  Service: Urology    VA CYSTOURETHROSCOPY,URETER CATHETER Right 1/9/2020    Procedure: CYSTOSCOPY RETROGRADE PYELOGRAM WITH INSERTION STENT URETERAL;  Surgeon: Jonnathan Dillon MD;  Location: [facility];  Service: Urology    US GUIDED VASCULAR ACCESS  1/17/2017       Family History   Problem Relation Age of Onset    Alcohol abuse Mother     Mental illness Mother     Alcohol abuse Father     COPD Maternal Grandmother     Lung cancer Maternal Grandmother 72    COPD Maternal Grandfather     COPD Paternal Grandmother     Lung cancer Paternal Grandmother 72    No Known Problems Sister     No Known Problems Paternal Aunt     Breast cancer Neg Hx     Ovarian cancer Neg Hx     Colon cancer Neg Hx      I have reviewed and agree with the history as documented  E-Cigarette/Vaping    E-Cigarette Use Never User      E-Cigarette/Vaping Substances     Social History     Tobacco Use    Smoking status: Former Smoker     Packs/day: 0 50     Years: 12 00     Pack years: 6 00    Smokeless tobacco: Never Used   Vaping Use    Vaping Use: Never used   Substance Use Topics    Alcohol use: Yes     Comment: social    Drug use: Yes     Frequency: 2 0 times per week     Types: Marijuana     Comment: medical edible marijuana prescribed       Review of Systems   Constitutional: Positive for fatigue  Negative for appetite change, chills and fever  HENT: Negative for sneezing and sore throat  Eyes: Negative for visual disturbance  Respiratory: Positive for cough, chest tightness and shortness of breath  Negative for choking and wheezing  Cardiovascular: Negative for chest pain and palpitations  Gastrointestinal: Negative for abdominal pain, constipation, diarrhea, nausea and vomiting  Genitourinary: Negative for difficulty urinating and dysuria  Neurological: Negative for dizziness, weakness, light-headedness, numbness and headaches  All other systems reviewed and are negative  Physical Exam  Physical Exam  Vitals and nursing note reviewed  Constitutional:       General: She is not in acute distress  Appearance: She is well-developed  She is not diaphoretic  HENT:      Head: Normocephalic and atraumatic  Eyes:      Pupils: Pupils are equal, round, and reactive to light  Neck:      Vascular: No JVD  Trachea: No tracheal deviation  Cardiovascular:      Rate and Rhythm: Normal rate and regular rhythm  Heart sounds: Normal heart sounds  No murmur heard  No friction rub  No gallop  Pulmonary:      Effort: Pulmonary effort is normal  No respiratory distress  Breath sounds: Examination of the right-upper field reveals decreased breath sounds   Examination of the right-middle field reveals decreased breath sounds  Examination of the right-lower field reveals decreased breath sounds  Decreased breath sounds present  No wheezing or rales  Abdominal:      General: Bowel sounds are normal  There is no distension  Palpations: Abdomen is soft  Tenderness: There is no abdominal tenderness  There is no guarding or rebound  Skin:     General: Skin is warm and dry  Coloration: Skin is not pale  Neurological:      Mental Status: She is alert and oriented to person, place, and time  Cranial Nerves: No cranial nerve deficit  Motor: No abnormal muscle tone     Psychiatric:         Behavior: Behavior normal          Vital Signs  ED Triage Vitals   Temperature Pulse Respirations Blood Pressure SpO2   09/12/22 1724 09/12/22 1724 09/12/22 1724 09/12/22 1724 09/12/22 1724   98 1 °F (36 7 °C) 99 (!) 24 144/77 97 %      Temp Source Heart Rate Source Patient Position - Orthostatic VS BP Location FiO2 (%)   09/12/22 2207 09/12/22 1900 -- 09/12/22 2207 --   Oral Monitor  Left arm       Pain Score       09/12/22 1831       7           Vitals:    09/13/22 1403 09/13/22 1434 09/13/22 1549 09/13/22 2238   BP: 130/77   132/78   Pulse: 86 84 81 84         Visual Acuity      ED Medications  Medications   albuterol inhalation solution 5 mg (5 mg Nebulization Given 9/12/22 1815)   ipratropium (ATROVENT) 0 02 % inhalation solution 0 5 mg (0 5 mg Nebulization Given 9/12/22 1815)   fentanyl citrate (PF) 100 MCG/2ML 75 mcg (75 mcg Intravenous Given 9/12/22 1831)   sodium chloride 0 9 % bolus 1,000 mL (0 mL Intravenous Stopped 9/12/22 2158)   iohexol (OMNIPAQUE) 350 MG/ML injection (MULTI-DOSE) 85 mL (85 mL Intravenous Given 9/12/22 1921)   fentanyl citrate (PF) 100 MCG/2ML 75 mcg (75 mcg Intravenous Given 9/12/22 2017)   sodium chloride 0 9 % bolus 1,000 mL (0 mL Intravenous Stopped 9/14/22 0128)   linezolid (ZYVOX) IVPB (premix in dextrose) 600 mg 300 mL (0 mg Intravenous Stopped 9/13/22 0453)   piperacillin-tazobactam (ZOSYN) 3 375 g in sodium chloride 0 9 % 100 mL IVPB (0 g Intravenous Stopped 9/12/22 2239)   ALPRAZolam (XANAX) tablet 1 mg (1 mg Oral Given 9/13/22 0611)       Diagnostic Studies  Results Reviewed     Procedure Component Value Units Date/Time    Blood culture #1 [[id number]] Collected: 09/12/22 1830    Lab Status: Preliminary result Specimen: Blood from Central Venous Line Updated: 09/16/22 0003     Blood Culture No Growth at 72 hrs  Blood culture #2 [[id number]] Collected: 09/12/22 1819    Lab Status: Preliminary result Specimen: Blood from Arm, Left Updated: 09/16/22 0003     Blood Culture No Growth at 72 hrs      Procalcitonin, Next Day AM Collection [[id number]]  (Abnormal) Collected: 09/13/22 0603    Lab Status: Final result Specimen: Blood from Line Updated: 09/13/22 0656     Procalcitonin 0 39 ng/ml     CBC and Platelet, AM Draw, Tomorrow [[id number]]  (Abnormal) Collected: 09/13/22 0603    Lab Status: Final result Specimen: Blood from Line Updated: 09/13/22 0642     WBC 3 61 Thousand/uL      RBC 2 96 Million/uL      Hemoglobin 8 2 g/dL      Hematocrit 27 1 %      MCV 92 fL      MCH 27 7 pg      MCHC 30 3 g/dL      RDW 16 1 %      Platelets 63 Thousands/uL      MPV 11 3 fL     Basic metabolic panel, AM Draw, Tomorrow [[id number]]  (Abnormal) Collected: 09/13/22 0603    Lab Status: Final result Specimen: Blood from Line Updated: 09/13/22 0640     Sodium 141 mmol/L      Potassium 4 6 mmol/L      Chloride 108 mmol/L      CO2 21 mmol/L      ANION GAP 12 mmol/L      BUN 22 mg/dL      Creatinine 1 18 mg/dL      Glucose 193 mg/dL      Calcium 7 9 mg/dL      eGFR 56 ml/min/1 73sq m     Narrative:      [place] guidelines for Chronic Kidney Disease (CKD):     Stage 1 with normal or high GFR (GFR > 90 mL/min/1 73 square meters)    Stage 2 Mild CKD (GFR = 60-89 mL/min/1 73 square meters)    Stage 3A Moderate CKD (GFR = 45-59 mL/min/1 73 square meters)    Stage 3B Moderate CKD (GFR = 30-44 mL/min/1 73 square meters)    Stage 4 Severe CKD (GFR = 15-29 mL/min/1 73 square meters)    Stage 5 End Stage CKD (GFR <15 mL/min/1 73 square meters)  Note: GFR calculation is accurate only with a steady state creatinine    Lactic acid, plasma [[id number]]  (Abnormal) Collected: 09/12/22 2229    Lab Status: Final result Specimen: Blood from Central Venous Line Updated: 09/12/22 2313     LACTIC ACID 3 8 mmol/L     Narrative:      Result may be elevated if tourniquet was used during collection  HS Troponin I 4hr [[id number]]  (Normal) Collected: 09/12/22 2229    Lab Status: Final result Specimen: Blood from Central Venous Line Updated: 09/12/22 2310     hs TnI 4hr 15 ng/L      Delta 4hr hsTnI 2 ng/L     Procalcitonin [[id number]]  (Abnormal) Collected: 09/12/22 2229    Lab Status: Final result Specimen: Blood from Central Venous Line Updated: 09/12/22 2310     Procalcitonin 0 49 ng/ml     Lactic acid 2 Hours [[id number]]  (Abnormal) Collected: 09/12/22 2056    Lab Status: Final result Specimen: Blood from Arm, Right Updated: 09/12/22 2155     LACTIC ACID 3 5 mmol/L     Narrative:      Result may be elevated if tourniquet was used during collection  HS Troponin I 2hr [[id number]]  (Normal) Collected: 09/12/22 2056    Lab Status: Final result Specimen: Blood from Arm, Right Updated: 09/12/22 2130     hs TnI 2hr 12 ng/L      Delta 2hr hsTnI -1 ng/L     FLU/RSV/COVID - if FLU/RSV clinically relevant [[id number]]  (Normal) Collected: 09/12/22 1819    Lab Status: Final result Specimen: Nares from Nose Updated: 09/12/22 1914     SARS-CoV-2 Negative     INFLUENZA A PCR Negative     INFLUENZA B PCR Negative     RSV PCR Negative    Narrative:      FOR PEDIATRIC PATIENTS - copy/paste COVID Guidelines URL to browser: [email or web address]    SARS-CoV-2 assay is a Nucleic Acid Amplification assay intended for the  qualitative detection of nucleic acid from SARS-CoV-2 in nasopharyngeal  swabs  Results are for the presumptive identification of SARS-CoV-2 RNA  Positive results are indicative of infection with SARS-CoV-2, the virus  causing COVID-19, but do not rule out bacterial infection or co-infection  with other viruses  Laboratories within the [place] and its  territories are required to report all positive results to the appropriate  public health authorities  Negative results do not preclude SARS-CoV-2  infection and should not be used as the sole basis for treatment or other  patient management decisions  Negative results must be combined with  clinical observations, patient history, and epidemiological information  This test has not been FDA cleared or approved  This test has been authorized by FDA under an Emergency Use Authorization  (EUA)  This test is only authorized for the duration of time the  declaration that circumstances exist justifying the authorization of the  emergency use of an in vitro diagnostic tests for detection of SARS-CoV-2  virus and/or diagnosis of COVID-19 infection under section 564(b)(1) of  the Act, 21 U  S C  762CPH-8(R)(6), unless the authorization is terminated  or revoked sooner  The test has been validated but independent review by FDA  and CLIA is pending  Test performed using [employer] (acquired by [employer]) GeneXpert: This RT-PCR assay targets N2,  a region unique to SARS-CoV-2  A conserved region in the E-gene was chosen  for pan-Sarbecovirus detection which includes SARS-CoV-2  Lactic acid [[id number]]  (Abnormal) Collected: 09/12/22 1819    Lab Status: Final result Specimen: Blood from Arm, Left Updated: 09/12/22 1910     LACTIC ACID 5 3 mmol/L     Narrative:      Result may be elevated if tourniquet was used during collection      CBC and differential [[id number]]  (Abnormal) Collected: 09/12/22 1819    Lab Status: Final result Specimen: Blood from Arm, Left Updated: 09/12/22 1856     WBC 4 95 Thousand/uL      RBC 3 77 Million/uL      Hemoglobin 10 5 g/dL      Hematocrit 34 1 %      MCV 91 fL      MCH 27 9 pg      MCHC 30 8 g/dL      RDW 15 9 %      MPV 10 6 fL      Platelets 80 Thousands/uL      nRBC 0 /100 WBCs      Neutrophils Relative 73 %      Immat GRANS % 0 %      Lymphocytes Relative 24 %      Monocytes Relative 3 %      Eosinophils Relative 0 %      Basophils Relative 0 %      Neutrophils Absolute 3 56 Thousands/µL      Immature Grans Absolute 0 02 Thousand/uL      Lymphocytes Absolute 1 20 Thousands/µL      Monocytes Absolute 0 15 Thousand/µL      Eosinophils Absolute 0 01 Thousand/µL      Basophils Absolute 0 01 Thousands/µL     Procalcitonin [[id number]]  (Abnormal) Collected: 09/12/22 1819    Lab Status: Final result Specimen: Blood from Arm, Left Updated: 09/12/22 1856     Procalcitonin 0 59 ng/ml     HS Troponin 0hr (reflex protocol) [[id number]]  (Normal) Collected: 09/12/22 1819    Lab Status: Final result Specimen: Blood from Arm, Left Updated: 09/12/22 1855     hs TnI 0hr 13 ng/L     Comprehensive metabolic panel [[id number]]  (Abnormal) Collected: 09/12/22 1819    Lab Status: Final result Specimen: Blood from Arm, Left Updated: 09/12/22 1846     Sodium 138 mmol/L      Potassium 5 0 mmol/L      Chloride 103 mmol/L      CO2 20 mmol/L      ANION GAP 15 mmol/L      BUN 31 mg/dL      Creatinine 1 67 mg/dL      Glucose 410 mg/dL      Calcium 8 9 mg/dL      Corrected Calcium 9 8 mg/dL      AST 11 U/L      ALT 38 U/L      Alkaline Phosphatase 73 U/L      Total Protein 7 6 g/dL      Albumin 2 9 g/dL      Total Bilirubin 0 28 mg/dL      eGFR 36 ml/min/1 73sq m     Narrative:      [place] guidelines for Chronic Kidney Disease (CKD):     Stage 1 with normal or high GFR (GFR > 90 mL/min/1 73 square meters)    Stage 2 Mild CKD (GFR = 60-89 mL/min/1 73 square meters)    Stage 3A Moderate CKD (GFR = 45-59 mL/min/1 73 square meters)    Stage 3B Moderate CKD (GFR = 30-44 mL/min/1 73 square meters)    Stage 4 Severe CKD (GFR = 15-29 mL/min/1 73 square meters)    Stage 5 End Stage CKD (GFR <15 mL/min/1 73 square meters)  Note: GFR calculation is accurate only with a steady state creatinine    D-dimer, quantitative [[id number]]  (Abnormal) Collected: 09/12/22 1819    Lab Status: Final result Specimen: Blood from Arm, Left Updated: 09/12/22 1844     D-Dimer, Quant 1 77 ug/ml FEU     Protime-INR [[id number]]  (Normal) Collected: 09/12/22 1819    Lab Status: Final result Specimen: Blood from Arm, Left Updated: 09/12/22 1841     Protime 12 7 seconds      INR 0 97    APTT [[id number]]  (Normal) Collected: 09/12/22 1819    Lab Status: Final result Specimen: Blood from Arm, Left Updated: 09/12/22 1841     PTT 24 seconds                  CTA ED chest PE Study   Final Result by Jasbir Melara MD (09/12 1938)      No pulmonary embolus  Moderate tree-in-bud nodularity throughout the left lung with a few larger nodules, up to 8 mm, compatible with bronchiolitis and possibly early bronchopneumonia  Left lung transplant and right pneumonectomy  Workstation performed: [id number]         XR chest 1 view portable   Final Result by Alireza Simmons MD (09/13 0831)      Right-sided pneumonectomy  Scattered nodules in the left lung likely infectious in origin  Workstation performed: [id number]                    Procedures  Procedures         ED Course                                             MDM  Number of Diagnoses or Management Options  GEOFF (acute kidney injury) ([facility] [address] )  Pneumonia  Diagnosis management comments: 77-year-old female with concerns for worsening infection, possible pulmonary embolism, will give fluids, check labs, D-dimer, possibly PE scan, reassess  Dimer was elevated, scan shows no PE but findings consistent with persistent pneumonia  Given her clinical symptoms I believe this reflects worsening infection    She also has evidence of renal impairment here, and so will continue to hydrate and plan to admit for this  I was able to discuss the case with her transplant doctors at [facility], will plan to transfer to [facility] but given that there is no availability overnight tonight, will admit to medicine here  Reviewed recommendations from her pulmonologist at [facility] and shared these to the medicine team here  Disposition  Final diagnoses:   Pneumonia   GEOFF (acute kidney injury) ([address] )     Time reflects when diagnosis was documented in both MDM as applicable and the Disposition within this note     Time User Action Codes Description Comment    9/12/2022  9:14 PM Cody Valencia [[id number]] Pneumonia     9/12/2022  9:15 PM Cody Valencia [[id number]] GEOFF (acute kidney injury) [facility])       ED Disposition     ED Disposition   Admit    Condition   Stable    Date/Time   Mon Sep 12, 2022  9:14 PM    Comment   Case was discussed with KORY and the patient's admission status was agreed to be Admission Status: inpatient status to the service of Dr Naresh Tucker MD Documentation    [address] Name, [address] [facility]      RN Documentation    [address] Name, [address]   Bed Assignment 192      Follow-up Information    None         Discharge Medication List as of 9/14/2022  1:19 AM      CONTINUE these medications which have NOT CHANGED    Details   acetaminophen (TYLENOL) 325 mg tablet Take 3 tablets (975 mg total) by mouth every 8 (eight) hours, Starting Sun 9/26/2021, No Print      albuterol (2 5 mg/3 mL) 0 083 % nebulizer solution Take 6 mL (5 mg total) by nebulization 2 (two) times a day, Starting Sun 9/26/2021, No Print      ALPRAZolam (XANAX) 1 mg tablet Take 1 tablet (1 mg total) by mouth daily at bedtime as needed for anxiety or sleep, Starting u 8/11/2022, Normal      apixaban (ELIQUIS) 5 mg Take 1 tablet (5 mg total) by mouth 2 (two) times a day, Starting Sun 9/26/2021, No Print      atovaquone (MEPRON) 750 mg/5 mL suspension Take 1,500 mg by mouth daily, Historical Med      dexlansoprazole (DEXILANT) 60 MG capsule Take 1 capsule (60 mg total) by mouth in the morning , Starting Tue 5/24/2022, Normal      dronabinol (MARINOL) 5 MG capsule TAKE 1 CAPSULE (5 MG TOTAL) BY MOUTH 3 (THREE) TIMES A DAY, Starting Thu 8/19/2021, Normal      famotidine (PEPCID) 40 MG tablet TAKE 1 TABLET BY MOUTH EVERYDAY AT BEDTIME, Normal      insulin lispro (HumaLOG) 100 units/mL injection Inject 4 Units under the skin 3 (three) times a day with meals, Starting Sun 9/26/2021, No Print      medroxyPROGESTERone (DEPO-PROVERA) 150 mg/mL injection Inject 1 mL (150 mg total) into a muscle every 3 (three) months, Starting Tue 11/1/2022, Normal      pancrelipase, Lip-Prot-Amyl, (CREON) 24,000 units Take 48,000 Units by mouth 3 (three) times a day with meals, Historical Med      predniSONE 10 mg tablet Take 10 mg by mouth daily, Starting Tue 4/27/2021, Historical Med      Respiratory Therapy Supplies (Nebulizer) SUKHI Please dispense nebulizer machine, Historical Med      tacrolimus (PROGRAF) 1 mg capsule Take 0 5 mg by mouth 2 (two) times a day, Starting Thu 7/1/2021, Historical Med      valACYclovir (VALTREX) 500 mg tablet Take 500 mg by mouth daily, Starting Tue 6/1/2021, Historical Med      cyclobenzaprine (FLEXERIL) 5 mg tablet Take 1-2 tabs every 8 hours as needed for muscle pain/spasm, Normal      hydrocodone-chlorpheniramine polistirex (TUSSIONEX) 10-8 mg/5 mL ER suspension Take 5 mL by mouth every 12 (twelve) hours as needed for cough Max Daily Amount: 10 mL, Starting Mon 8/1/2022, Normal      metoclopramide (REGLAN) 10 mg tablet TAKE 1 TABLET BY MOUTH 3 TIMES A DAY WITH MEALS , Normal      naloxone (NARCAN) 4 mg/0 1 mL nasal spray 4 mg into each nostril, Starting Mon 6/6/2022, Historical Med      nystatin (MYCOSTATIN) 500,000 units/5 mL suspension Apply 5 mL (500,000 Units total) to the mouth or throat 4 (four) times a day, Starting Mon 4/25/2022, Normal      pregabalin (Lyrica) 100 mg capsule Take 1 capsule (100 mg total) by mouth 2 (two) times a day, Starting Fri 8/26/2022, Normal      sucralfate (CARAFATE) 1 g/10 mL suspension Take 10 mL (1 g total) by mouth 4 (four) times a day (with meals and at bedtime), Starting Mon 8/1/2022, Normal      traZODone (DESYREL) 50 mg tablet Take 0 5 tablets (25 mg total) by mouth daily at bedtime, Starting Sun 9/26/2021, No Print             No discharge procedures on file      PDMP Review       Value Time User    PDMP Reviewed  Yes 8/26/2022 12:40 PM Keenan Dubon MD          ED Provider  Electronically Signed by           Doris Garcia MD  09/16/22 [phone]

## 2022-09-17 LAB
BACTERIA SPT RESP CULT: ABNORMAL
GRAM STN SPEC: ABNORMAL
GRAM STN SPEC: ABNORMAL

## 2022-09-18 LAB
BACTERIA BLD CULT: NORMAL
BACTERIA BLD CULT: NORMAL

## 2022-09-19 ENCOUNTER — TELEPHONE (OUTPATIENT)
Dept: ADMINISTRATIVE | Facility: HOSPITAL | Age: 47
End: 2022-09-19

## 2022-10-11 PROBLEM — R05.9 COUGH: Status: RESOLVED | Noted: 2022-08-02 | Resolved: 2022-10-11

## 2022-10-13 ENCOUNTER — OFFICE VISIT (OUTPATIENT)
Dept: INTERNAL MEDICINE CLINIC | Facility: CLINIC | Age: 47
End: 2022-10-13
Payer: MEDICARE

## 2022-10-13 VITALS
HEIGHT: 62 IN | OXYGEN SATURATION: 100 % | BODY MASS INDEX: 20.24 KG/M2 | SYSTOLIC BLOOD PRESSURE: 118 MMHG | HEART RATE: 105 BPM | TEMPERATURE: 97.6 F | DIASTOLIC BLOOD PRESSURE: 78 MMHG | WEIGHT: 110 LBS

## 2022-10-13 DIAGNOSIS — M79.604 RIGHT LEG PAIN: Primary | ICD-10-CM

## 2022-10-13 PROCEDURE — 99213 OFFICE O/P EST LOW 20 MIN: CPT | Performed by: INTERNAL MEDICINE

## 2022-10-13 RX ORDER — PEN NEEDLE, DIABETIC 32GX 5/32"
NEEDLE, DISPOSABLE MISCELLANEOUS
COMMUNITY
Start: 2022-09-08

## 2022-10-13 RX ORDER — LINACLOTIDE 145 UG/1
145 CAPSULE, GELATIN COATED ORAL DAILY
COMMUNITY
Start: 2022-09-23 | End: 2022-10-25

## 2022-10-13 RX ORDER — INSULIN GLARGINE 100 [IU]/ML
8 INJECTION, SOLUTION SUBCUTANEOUS
COMMUNITY
Start: 2022-09-08 | End: 2022-10-28

## 2022-10-13 RX ORDER — HYDROMORPHONE HYDROCHLORIDE 4 MG/1
TABLET ORAL
COMMUNITY
Start: 2022-09-02

## 2022-10-13 NOTE — PROGRESS NOTES
Assessment and Plan:    1  Right leg pain  Assessment & Plan: On ambulation, used a cane and had slow walk  No imbalance noted  Patient with significant tenderness to touch over lower inner thigh, popliteal area, and ankle area  Patient's pain is worse with active and passive range of motion  Internal and external rotation of hip cause increase in pain  Weakness likely secondary to pain  Concern for avascular necrosis given steroid use, possible DVT (patient previously on Eliquis, discontinued 1 month ago her medical team at [facility]), could also include worsening neuropathy  Plan:   · STAT right hip x-ray   · STAT right lower extremity venous duplex  · Pending results, patient will be contacted for further plan of management    Orders:  -     XR hip/pelv 2-3 vws right if performed; Future; Expected date: 10/13/2022  -     VAS lower limb venous duplex study, unilateral/limited; Future; Expected date: 10/13/2022             HPI:     Franchesca Vasquez is  42 y o  female with history of type 1 diabetes mellitus, cystic fibrosis, DVT, and status post right-sided pneumonectomy, that arrives to clinic for evaluation of right leg pain  Patient reports pain started 3 days ago, located mainly at inner hip area, radiates downwards towards ankle, constant, achy in characteristic, worse with movement, improved with Dilaudid, worsened in the morning and scored 8/10  Patient reports chronic leg pain given neuropathy, but current symptoms are different  Patient also report itching without any skin changes at inner thigh, lateral lower leg, and superficial skin of right toes  Patient is currently on a steroid taper  Patient denies fever, chills, diaphoresis, rashes, worsened shortness of breath or trauma/falls  Patient's physical activity has been limited given her pain  Has been using a wheelchair occasionally and mainly a cane for ambulation       Patient previously on Eliquis which she discontinued a month ago as per instructions from her medical team at [facility]  Patient reports she was previously on Eliquis given ECMO  Patient has no other complaints at this time  Subjective:    Review of Systems   Constitutional: Negative for chills, fatigue and fever  HENT: Negative for ear pain and sore throat  Eyes: Negative for pain and visual disturbance  Respiratory: Negative for cough and shortness of breath  Cardiovascular: Negative for chest pain and palpitations  Gastrointestinal: Negative for abdominal pain and vomiting  Genitourinary: Negative for difficulty urinating, dysuria, frequency and hematuria  Musculoskeletal: Positive for arthralgias, gait problem and myalgias  Negative for back pain, joint swelling, neck pain and neck stiffness  Skin: Negative for color change, rash and wound  pruritis   Neurological: Negative for seizures, syncope and headaches  Psychiatric/Behavioral: Negative for sleep disturbance  All other systems reviewed and are negative         Patient Active Problem List   Diagnosis   • S/P pneumonectomy   • Type 1 diabetes (HCC)   • Pancreatic insufficiency   • Asthma   • Chronic pain disorder   • DVT (deep venous thrombosis) (HCC)   • Acute on chronic anemia   • Long term current use of bisphosphonates   • Long term current use of systemic steroids   • Osteoporosis   • Anxiety   • CKD (chronic kidney disease) stage 2, GFR 60-89 ml/min   • Chronic sinusitis   • Colon polyp   • Esophageal reflux   • Hiatal hernia   • Immunosuppression (HCC)   • Severe protein-calorie malnutrition (HCC)   • Transplant rejection   • Cystic fibrosis (HCC)   • Essential hypertension   • Hypercholesteremia   • Weakness of right lower extremity   • Moderate protein-calorie malnutrition (HCC)   • Neuropathic pain of right foot   • Ulcerative pancolitis without complication (HCC)   • Gastroparesis   • Abnormal uterine bleeding (AUB)   • Jejunostomy tube present [facility])   • Pulmonary hypertension [facility])   • Renal calculus   • Nodule of left lung   • Rib pain   • Bronchopneumonia   • Right leg pain        Current Outpatient Medications   Medication Sig Dispense Refill   • albuterol (2 5 mg/3 mL) 0 083 % nebulizer solution Take 6 mL (5 mg total) by nebulization 2 (two) times a day  0   • ALPRAZolam (XANAX) 1 mg tablet Take 1 tablet (1 mg total) by mouth daily at bedtime as needed for anxiety or sleep 30 tablet 0   • atovaquone (MEPRON) 750 mg/5 mL suspension Take 1,500 mg by mouth daily     • dexlansoprazole (DEXILANT) 60 MG capsule Take 1 capsule (60 mg total) by mouth in the morning  30 capsule 0   • dronabinol (MARINOL) 5 MG capsule TAKE 1 CAPSULE (5 MG TOTAL) BY MOUTH 3 (THREE) TIMES A DAY (Patient taking differently: Take 5 mg by mouth 3 (three) times a day as needed) 90 capsule 3   • HYDROmorphone (DILAUDID) 4 mg tablet      • insulin glargine (LANTUS) 100 units/mL subcutaneous injection Inject 8 Units under the skin     • linaCLOtide (Linzess) 145 MCG CAPS Take 145 mcg by mouth daily     • [START ON 11/1/2022] medroxyPROGESTERone (DEPO-PROVERA) 150 mg/mL injection Inject 1 mL (150 mg total) into a muscle every 3 (three) months 1 mL 3   • metoclopramide (REGLAN) 10 mg tablet TAKE 1 TABLET BY MOUTH 3 TIMES A DAY WITH MEALS   90 tablet 1   • naloxone (NARCAN) 4 mg/0 1 mL nasal spray 4 mg into each nostril     • nystatin (MYCOSTATIN) 500,000 units/5 mL suspension Apply 5 mL (500,000 Units total) to the mouth or throat 4 (four) times a day 180 mL 0   • pancrelipase, Lip-Prot-Amyl, (CREON) 24,000 units Take 48,000 Units by mouth 3 (three) times a day with meals     • predniSONE 10 mg tablet Take 10 mg by mouth daily     • pregabalin (Lyrica) 100 mg capsule Take 1 capsule (100 mg total) by mouth 2 (two) times a day 60 capsule 0   • Respiratory Therapy Supplies (Nebulizer) SUKHI Please dispense nebulizer machine     • Sennosides 17 2 MG TABS Take 17 2 mg by mouth 2 (two) times a day     • sucralfate (CARAFATE) 1 g/10 mL suspension Take 10 mL (1 g total) by mouth 4 (four) times a day (with meals and at bedtime) 420 mL 1   • tacrolimus (PROGRAF) 1 mg capsule Take 0 5 mg by mouth 2 (two) times a day     • valACYclovir (VALTREX) 500 mg tablet Take 500 mg by mouth daily     • acetaminophen (TYLENOL) 325 mg tablet Take 3 tablets (975 mg total) by mouth every 8 (eight) hours (Patient not taking: Reported on 10/13/2022)  0   • apixaban (ELIQUIS) 5 mg Take 1 tablet (5 mg total) by mouth 2 (two) times a day (Patient not taking: Reported on 10/13/2022)  0   • BD Pen Needle Abi U/F 32G X 4 MM MISC      • cyclobenzaprine (FLEXERIL) 5 mg tablet Take 1-2 tabs every 8 hours as needed for muscle pain/spasm (Patient not taking: Reported on 10/13/2022) 90 tablet 0   • famotidine (PEPCID) 40 MG tablet TAKE 1 TABLET BY MOUTH EVERYDAY AT BEDTIME (Patient not taking: Reported on 10/13/2022) 90 tablet 1   • hydrocodone-chlorpheniramine polistirex (TUSSIONEX) 10-8 mg/5 mL ER suspension Take 5 mL by mouth every 12 (twelve) hours as needed for cough Max Daily Amount: 10 mL (Patient not taking: No sig reported) 120 mL 0   • insulin lispro (HumaLOG) 100 units/mL injection Inject 4 Units under the skin 3 (three) times a day with meals (Patient not taking: No sig reported)  0   • traZODone (DESYREL) 50 mg tablet Take 0 5 tablets (25 mg total) by mouth daily at bedtime (Patient not taking: No sig reported)  0     No current facility-administered medications for this visit          Allergies   Allergen Reactions   • Bebtelovimab Anaphylaxis   • Vancomycin Angioedema and Hives   • Gabapentin Hallucinations   • Ceftazidime Headache     Severe headaches after desensitization   • Nsaids Other (See Comments)     Lung transplant increases risk of renal toxicity, call lung txp provider to discuss if needed        The following portions of the patient's history were reviewed and updated as appropriate: allergies, current medications, past family history, past medical history, past social history, past surgical history and problem list              Objective:    /78 (BP Location: Left arm, Patient Position: Sitting, Cuff Size: Standard)   Pulse 105   Temp 97 6 °F (36 4 °C) (Tympanic)   Ht 5' 2" (1 575 m)   Wt 49 9 kg (110 lb)   SpO2 100%   BMI 20 12 kg/m²      Body mass index is 20 12 kg/m²  Physical Exam  Vitals and nursing note reviewed  Constitutional:       General: She is in acute distress  Appearance: Normal appearance  She is normal weight  She is not ill-appearing or diaphoretic  HENT:      Head: Normocephalic and atraumatic  Mouth/Throat:      Mouth: Mucous membranes are moist       Pharynx: Oropharynx is clear  Eyes:      General: No scleral icterus  Conjunctiva/sclera: Conjunctivae normal    Cardiovascular:      Rate and Rhythm: Regular rhythm  Tachycardia present  Pulses: Normal pulses  Heart sounds: Normal heart sounds  No murmur heard  No gallop  Pulmonary:      Effort: Pulmonary effort is normal  No respiratory distress  Breath sounds: Normal breath sounds  No wheezing or rales  Abdominal:      General: Bowel sounds are normal  There is no distension  Palpations: Abdomen is soft  There is no mass  Tenderness: There is no abdominal tenderness  Musculoskeletal:         General: No swelling  Cervical back: Normal range of motion and neck supple  Right hip: Decreased range of motion  Right lower leg: Tenderness (over inner thigh, popliteal area, ankle) present  No swelling, deformity, lacerations or bony tenderness  No edema  Left lower leg: No edema  Right ankle: No deformity or ecchymosis  Comments: Pain w passive and active ROM  Internal and external rotation of hip cause increase in pain  Skin:     General: Skin is warm and dry  Capillary Refill: Capillary refill takes less than 2 seconds  Coloration: Skin is not jaundiced or pale        Findings: No bruising or erythema  Neurological:      General: No focal deficit present  Mental Status: She is alert and oriented to person, place, and time  Mental status is at baseline  Sensory: No sensory deficit  Psychiatric:         Mood and Affect: Mood normal          Behavior: Behavior normal          Thought Content:  Thought content normal          Judgment: Judgment normal

## 2022-10-13 NOTE — ASSESSMENT & PLAN NOTE
On ambulation, used a cane and had slow walk  No imbalance noted  Patient with significant tenderness to touch over lower inner thigh, popliteal area, and ankle area  Patient's pain is worse with active and passive range of motion  Internal and external rotation of hip cause increase in pain  Weakness likely secondary to pain  Concern for avascular necrosis given steroid use, possible DVT (patient previously on Eliquis, discontinued 1 month ago her medical team at [facility]), could also include worsening neuropathy      Plan:   · STAT right hip x-ray   · STAT right lower extremity venous duplex  · Pending results, patient will be contacted for further plan of management

## 2022-10-14 ENCOUNTER — HOSPITAL ENCOUNTER (EMERGENCY)
Facility: HOSPITAL | Age: 47
Discharge: HOME | End: 2022-10-14
Attending: EMERGENCY MEDICINE
Payer: COMMERCIAL

## 2022-10-14 ENCOUNTER — APPOINTMENT (OUTPATIENT)
Dept: RADIOLOGY | Facility: HOSPITAL | Age: 47
End: 2022-10-14
Payer: COMMERCIAL

## 2022-10-14 ENCOUNTER — HOSPITAL ENCOUNTER (OUTPATIENT)
Dept: VASCULAR ULTRASOUND | Facility: HOSPITAL | Age: 47
Discharge: HOME | End: 2022-10-14
Payer: MEDICARE

## 2022-10-14 VITALS
DIASTOLIC BLOOD PRESSURE: 69 MMHG | TEMPERATURE: 98 F | OXYGEN SATURATION: 100 % | SYSTOLIC BLOOD PRESSURE: 99 MMHG | HEART RATE: 112 BPM | RESPIRATION RATE: 20 BRPM

## 2022-10-14 DIAGNOSIS — I82.409 DVT (DEEP VENOUS THROMBOSIS) (HCC): Primary | ICD-10-CM

## 2022-10-14 DIAGNOSIS — M79.604 RIGHT LEG PAIN: ICD-10-CM

## 2022-10-14 PROCEDURE — 93971 EXTREMITY STUDY: CPT | Performed by: SURGERY

## 2022-10-14 PROCEDURE — 99283 EMERGENCY DEPT VISIT LOW MDM: CPT

## 2022-10-14 PROCEDURE — 99285 EMERGENCY DEPT VISIT HI MDM: CPT | Performed by: EMERGENCY MEDICINE

## 2022-10-14 PROCEDURE — 72100 X-RAY EXAM L-S SPINE 2/3 VWS: CPT

## 2022-10-14 PROCEDURE — 96372 THER/PROPH/DIAG INJ SC/IM: CPT

## 2022-10-14 PROCEDURE — 93971 EXTREMITY STUDY: CPT

## 2022-10-14 RX ORDER — HYDROMORPHONE HCL/PF 1 MG/ML
1 SYRINGE (ML) INJECTION ONCE
Status: COMPLETED | OUTPATIENT
Start: 2022-10-14 | End: 2022-10-14

## 2022-10-14 RX ADMIN — APIXABAN 10 MG: 5 TABLET, FILM COATED ORAL at 10:29

## 2022-10-14 RX ADMIN — HYDROMORPHONE HYDROCHLORIDE 1 MG: 1 INJECTION, SOLUTION INTRAMUSCULAR; INTRAVENOUS; SUBCUTANEOUS at 10:29

## 2022-10-14 NOTE — ED PROVIDER NOTES
History  Chief Complaint   Patient presents with   • Leg Pain     Pt presents with c/o R leg pain, stopped taking eliquis per provider about a month ago and went for a US today and it is positive for a DVT  This patient was sent to the emergency department forearm the vascular lab secondary to positive DVT study on the right lower extremity  Patient has an acute occlusive exterior iliac vein thrombosis  Also peroneal and PTV  Patient admits to shortness of breath but this is chronic and not worse than her normal   Patient was recently discharged from the hospital for pneumonia, she has CF and history of lung transplant  She has also has had previous DVT and has been treated with Eliquis in the past       She had the ultrasound due to persistent pain in the right hip and right leg for last couple days  He was order as an outpatient and then sent to the emergency department after the positive study  Pain is moderate to severe, constant  History provided by:  Patient   used: No        Prior to Admission Medications   Prescriptions Last Dose Informant Patient Reported? Taking?    ALPRAZolam (XANAX) 1 mg tablet  Self No No   Sig: Take 1 tablet (1 mg total) by mouth daily at bedtime as needed for anxiety or sleep   BD Pen Needle Abi U/F 32G X 4 MM MISC  Self Yes No   HYDROmorphone (DILAUDID) 4 mg tablet  Self Yes No   Respiratory Therapy Supplies (Nebulizer) SUKHI  Self Yes No   Sig: Please dispense nebulizer machine   Sennosides 17 2 MG TABS  Self Yes No   Sig: Take 17 2 mg by mouth 2 (two) times a day   acetaminophen (TYLENOL) 325 mg tablet  Self No No   Sig: Take 3 tablets (975 mg total) by mouth every 8 (eight) hours   Patient not taking: Reported on 10/13/2022   albuterol (2 5 mg/3 mL) 0 083 % nebulizer solution  Self No No   Sig: Take 6 mL (5 mg total) by nebulization 2 (two) times a day   apixaban (ELIQUIS) 5 mg  Self No No   Sig: Take 1 tablet (5 mg total) by mouth 2 (two) times a day   Patient not taking: Reported on 10/13/2022   atovaquone (MEPRON) 750 mg/5 mL suspension  Self Yes No   Sig: Take 1,500 mg by mouth daily   cyclobenzaprine (FLEXERIL) 5 mg tablet  Self No No   Sig: Take 1-2 tabs every 8 hours as needed for muscle pain/spasm   Patient not taking: Reported on 10/13/2022   dexlansoprazole (DEXILANT) 60 MG capsule  Self No No   Sig: Take 1 capsule (60 mg total) by mouth in the morning     dronabinol (MARINOL) 5 MG capsule  Self No No   Sig: TAKE 1 CAPSULE (5 MG TOTAL) BY MOUTH 3 (THREE) TIMES A DAY   Patient taking differently: Take 5 mg by mouth 3 (three) times a day as needed   famotidine (PEPCID) 40 MG tablet  Self No No   Sig: TAKE 1 TABLET BY MOUTH EVERYDAY AT BEDTIME   Patient not taking: Reported on 10/13/2022   hydrocodone-chlorpheniramine polistirex (TUSSIONEX) 10-8 mg/5 mL ER suspension  Self No No   Sig: Take 5 mL by mouth every 12 (twelve) hours as needed for cough Max Daily Amount: 10 mL   Patient not taking: No sig reported   insulin glargine (LANTUS) 100 units/mL subcutaneous injection  Self Yes No   Sig: Inject 8 Units under the skin   insulin lispro (HumaLOG) 100 units/mL injection  Self No No   Sig: Inject 4 Units under the skin 3 (three) times a day with meals   Patient not taking: No sig reported   linaCLOtide (Linzess) 145 MCG CAPS  Self Yes No   Sig: Take 145 mcg by mouth daily   medroxyPROGESTERone (DEPO-PROVERA) 150 mg/mL injection  Self No No   Sig: Inject 1 mL (150 mg total) into a muscle every 3 (three) months   metoclopramide (REGLAN) 10 mg tablet  Self No No   Sig: TAKE 1 TABLET BY MOUTH 3 TIMES A DAY WITH MEALS    naloxone (NARCAN) 4 mg/0 1 mL nasal spray  Self Yes No   Si mg into each nostril   nystatin (MYCOSTATIN) 500,000 units/5 mL suspension  Self No No   Sig: Apply 5 mL (500,000 Units total) to the mouth or throat 4 (four) times a day   pancrelipase, Lip-Prot-Amyl, (CREON) 24,000 units  Self Yes No   Sig: Take 48,000 Units by mouth 3 (three) times a day with meals   predniSONE 10 mg tablet  Self Yes No   Sig: Take 10 mg by mouth daily   pregabalin (Lyrica) 100 mg capsule  Self No No   Sig: Take 1 capsule (100 mg total) by mouth 2 (two) times a day   sucralfate (CARAFATE) 1 g/10 mL suspension  Self No No   Sig: Take 10 mL (1 g total) by mouth 4 (four) times a day (with meals and at bedtime)   tacrolimus (PROGRAF) 1 mg capsule  Self Yes No   Sig: Take 0 5 mg by mouth 2 (two) times a day   traZODone (DESYREL) 50 mg tablet  Self No No   Sig: Take 0 5 tablets (25 mg total) by mouth daily at bedtime   Patient not taking: No sig reported   valACYclovir (VALTREX) 500 mg tablet  Self Yes No   Sig: Take 500 mg by mouth daily      Facility-Administered Medications: None       Past Medical History:   Diagnosis Date   • ABPA (allergic bronchopulmonary aspergillosis) ([place] [address] ) 7/8/2012   • Acute kidney injury ([place] [address] ) 9/8/2017   • Acute on chronic respiratory failure ([place] [address] ) 3/4/2017    Last Assessment & Plan:  Formatting of this note might be different from the original  Recurrent with clinical worsening with enlarging PTX, s/p multiple chest tubes, but new fevers 6/19 and worsening leukocytosis indicating infection as cause of 6/19 decompensation with profound hypoxia in setting of shunting through right lung and continued pneumothorax s/p 2nd chest tube placement    6/27: Right   • Anaphylaxis 6/6/2022   • Asthma    • Chest pain 9/21/2021   • Chronic pain    • Chronic respiratory failure with hypoxia ([place] [address] ) 9/7/2021   • COVID-19 6/3/2022   • Cystic fibrosis (HCC)    • Diabetes mellitus ([place] [address] )    • GERD (gastroesophageal reflux disease)    • HBP (high blood pressure)    • HCAP (healthcare-associated pneumonia) 3/6/2020   • Influenza B 2/23/2020   • Kidney stone    • Neutropenic fever ([place] [address] ) 9/26/2020   • Pneumonia due to Pseudomonas species ([place] [address] ) 10/28/2010    Last Assessment & Plan:  Formatting of this note might be different from the original  Polymicrobial pna w/ pseudomonas, M  Abscessus, Cadida glabrata/albicans   • PONV (postoperative nausea and vomiting)    • Pseudomonas aeruginosa infection 9/20/2012   • Transplant recipient     Lung- 2013, 2017   • Wellness examination 2/4/2013    Formatting of this note might be different from the original  Discussed with patient and acknowledgement form signed on 2/4/2013 (ALYSHA)       Past Surgical History:   Procedure Laterality Date   • APPENDECTOMY     • AUGMENTATION MAMMAPLASTY Bilateral 2004   • AUGMENTATION MAMMAPLASTY Right 2021    removed    • BREAST SURGERY      Aug     • BRONCHOSCOPY     • CHOLECYSTECTOMY     • COLONOSCOPY     • EGD  07/2019    Yeast Infect      • FL RETROGRADE PYELOGRAM  1/9/2020   • FL RETROGRADE PYELOGRAM  2/4/2020   • GASTROSTOMY TUBE, PLACE     • IR PORT PLACEMENT  3/22/2022   • IR PORT REMOVAL  1/10/2022   • IR TUNNELED CENTRAL LINE PLACEMENT  1/13/2022   • LUNG SURGERY     • LUNG TRANSPLANT      X2   • CO CYSTO/URETERO W/LITHOTRIPSY &INDWELL STENT INSRT Right 2/4/2020    Procedure: CYSTOSCOPY URETEROSCOPY WITH LITHOTRIPSY HOLMIUM LASER, RETROGRADE PYELOGRAM AND INSERTION STENT URETERAL;  Surgeon: Sheryl Reynaga MD;  Location: [facility];  Service: Urology   • CO CYSTOURETHROSCOPY,URETER CATHETER Right 1/9/2020    Procedure: CYSTOSCOPY RETROGRADE PYELOGRAM WITH INSERTION STENT URETERAL;  Surgeon: Sheryl Reynaga MD;  Location: [facility];  Service: Urology   • US GUIDED VASCULAR ACCESS  1/17/2017       Family History   Problem Relation Age of Onset   • Alcohol abuse Mother    • Mental illness Mother    • Alcohol abuse Father    • COPD Maternal Grandmother    • Lung cancer Maternal Grandmother 72   • COPD Maternal Grandfather    • COPD Paternal Grandmother    • Lung cancer Paternal Grandmother 72   • No Known Problems Sister    • No Known Problems Paternal Aunt    • Breast cancer Neg Hx    • Ovarian cancer Neg Hx    • Colon cancer Neg Hx      I have reviewed and agree with the history as documented  E-Cigarette/Vaping   • E-Cigarette Use Never User      E-Cigarette/Vaping Substances     Social History     Tobacco Use   • Smoking status: Former Smoker     Packs/day: 0 50     Years: 12 00     Pack years: 6 00   • Smokeless tobacco: Never Used   Vaping Use   • Vaping Use: Never used   Substance Use Topics   • Alcohol use: Yes     Comment: social   • Drug use: Yes     Frequency: 2 0 times per week     Types: Marijuana     Comment: medical edible marijuana prescribed       Review of Systems   Constitutional: Negative for chills and fever  HENT: Negative for ear pain and sore throat  Eyes: Negative for pain and visual disturbance  Respiratory: Positive for shortness of breath (chronic unchanged )  Negative for cough  Cardiovascular: Negative for chest pain and palpitations  Gastrointestinal: Negative for abdominal pain and vomiting  Genitourinary: Negative for dysuria and hematuria  Musculoskeletal: Negative for arthralgias and back pain  Skin: Negative for color change and rash  Neurological: Negative for seizures and syncope  All other systems reviewed and are negative  Physical Exam  Physical Exam  Vitals reviewed  HENT:      Head: Normocephalic  Right Ear: External ear normal       Left Ear: External ear normal       Nose: Nose normal       Mouth/Throat:      Mouth: Mucous membranes are moist    Eyes:      Extraocular Movements: Extraocular movements intact  Pupils: Pupils are equal, round, and reactive to light  Cardiovascular:      Rate and Rhythm: Regular rhythm  Tachycardia present  Pulses: Normal pulses  Heart sounds: Normal heart sounds  Pulmonary:      Effort: Pulmonary effort is normal    Abdominal:      General: Abdomen is flat  Palpations: Abdomen is soft  Musculoskeletal:         General: No swelling or tenderness  Right lower leg: No edema  Left lower leg: No edema  Skin:     General: Skin is warm and dry  Capillary Refill: Capillary refill takes less than 2 seconds  Neurological:      General: No focal deficit present  Mental Status: She is alert  Psychiatric:         Mood and Affect: Mood normal          Behavior: Behavior normal          Vital Signs  ED Triage Vitals [10/14/22 0958]   Temperature Pulse Respirations Blood Pressure SpO2   98 °F (36 7 °C) (!) 112 20 99/69 100 %      Temp Source Heart Rate Source Patient Position - Orthostatic VS BP Location FiO2 (%)   Oral Monitor Sitting Left arm --      Pain Score       --           Vitals:    10/14/22 0958   BP: 99/69   Pulse: (!) 112   Patient Position - Orthostatic VS: Sitting         Visual Acuity      ED Medications  Medications   apixaban (ELIQUIS) tablet 10 mg (has no administration in time range)   HYDROmorphone (DILAUDID) injection 1 mg (has no administration in time range)       Diagnostic Studies  Results Reviewed     None                 No orders to display              Procedures  Procedures         ED Course                                             MDM  Number of Diagnoses or Management Options  Diagnosis management comments: Patient with diagnosis of DVT  She has had previous DVT and has been on Eliquis in the past   Current pulse oximetry is 100%         Amount and/or Complexity of Data Reviewed  Tests in the radiology section of CPT®: reviewed    Risk of Complications, Morbidity, and/or Mortality  Presenting problems: low  Diagnostic procedures: low  Management options: low    Patient Progress  Patient progress: stable      Disposition  Final diagnoses:   DVT (deep venous thrombosis) ([facility] )     Time reflects when diagnosis was documented in both MDM as applicable and the Disposition within this note     Time User Action Codes Description Comment    10/14/2022 10:12 AM Rob Shukla Add [I82 409] DVT (deep venous thrombosis) ([facility] )     10/14/2022 10:13 AM Rob Sheppard Modify [I82 409] DVT (deep venous thrombosis) [facility])       ED Disposition ED Disposition   Discharge    Condition   Stable    Date/Time   Fri Oct 14, 2022 10:12 AM    Comment   Richard Fortune discharge to home/self care  Follow-up Information     Follow up With Specialties Details Why Contact Info    King Sarah MD Internal Medicine In 3 days  [address]  [phone]            Patient's Medications   Discharge Prescriptions    APIXABAN (ELIQUIS) 5 MG    Take 2 tablets (10 mg total) by mouth 2 (two) times a day for 7 days, THEN 1 tablet (5 mg total) 2 (two) times a day for 23 days  Start Date: 10/14/2022End Date: 11/13/2022       Order Dose: --       Quantity: 74 tablet    Refills: 0       No discharge procedures on file      PDMP Review       Value Time User    PDMP Reviewed  Yes 8/26/2022 12:40 PM King Sarah MD          ED Provider  Electronically Signed by           Rito Olmos MD  10/14/22 1013

## 2022-10-14 NOTE — PROGRESS NOTES
Date of encounter: 10/14/2022  Time of encounter: 9:50 am    Received message from Vascular lab - patient with +ve acute occlusive R LE DVT from calf veins into popliteal vein, femoral vein, common femoral and external iliac vein  Patient instructed to arrive at Emergency department ASAP

## 2022-10-14 NOTE — DISCHARGE INSTRUCTIONS
A  personal message from Dr Jannette Hurley,  Thank you so much for allowing me to care for you today  I pride myself in the care and attention I give all my patients  I hope you were a witness to this tonight  If for any reason your condition does not improve, worsens, or you have a question that was not answered during your visit you can feel free to text me on my personal phone   # [phone]  I will answer to your message and continue your care past your emergency room visit

## 2022-10-19 ENCOUNTER — CLINICAL SUPPORT (OUTPATIENT)
Dept: OBGYN CLINIC | Facility: CLINIC | Age: 47
End: 2022-10-19

## 2022-10-19 VITALS — SYSTOLIC BLOOD PRESSURE: 100 MMHG | DIASTOLIC BLOOD PRESSURE: 62 MMHG

## 2022-10-19 DIAGNOSIS — Z30.42 ENCOUNTER FOR MANAGEMENT AND INJECTION OF DEPO-PROVERA: Primary | ICD-10-CM

## 2022-10-19 RX ORDER — MEDROXYPROGESTERONE ACETATE 150 MG/ML
150 INJECTION, SUSPENSION INTRAMUSCULAR ONCE
Status: COMPLETED | OUTPATIENT
Start: 2022-10-19 | End: 2022-10-19

## 2022-10-19 RX ORDER — INSULIN GLARGINE 100 [IU]/ML
INJECTION, SOLUTION SUBCUTANEOUS
COMMUNITY
End: 2022-10-28 | Stop reason: ALTCHOICE

## 2022-10-19 RX ADMIN — MEDROXYPROGESTERONE ACETATE 150 MG: 150 INJECTION, SUSPENSION INTRAMUSCULAR at 11:28

## 2022-10-19 NOTE — PROGRESS NOTES
Pt is here for Depo Provera injection  Her last dose was 8/1/22  Her annual exam was on scheduled  Urine pregnancy test done: no  Depo given in left deltoid  Tolerated well    Lot [id number] Exp 10/2024  Next dose due 1/10-1/17  Refill needed- yes

## 2022-10-24 DIAGNOSIS — I82.401 ACUTE DEEP VEIN THROMBOSIS (DVT) OF RIGHT LOWER EXTREMITY, UNSPECIFIED VEIN (HCC): Primary | ICD-10-CM

## 2022-10-24 RX ORDER — OXYCODONE HYDROCHLORIDE 10 MG/1
10 TABLET ORAL EVERY 6 HOURS PRN
Qty: 56 TABLET | Refills: 0 | Status: SHIPPED | OUTPATIENT
Start: 2022-10-24 | End: 2022-10-25 | Stop reason: SDUPTHER

## 2022-10-25 DIAGNOSIS — E84.9 CYSTIC FIBROSIS (HCC): ICD-10-CM

## 2022-10-25 DIAGNOSIS — I82.401 ACUTE DEEP VEIN THROMBOSIS (DVT) OF RIGHT LOWER EXTREMITY, UNSPECIFIED VEIN (HCC): ICD-10-CM

## 2022-10-25 RX ORDER — OXYCODONE HYDROCHLORIDE 10 MG/1
10 TABLET ORAL EVERY 6 HOURS PRN
Qty: 56 TABLET | Refills: 0 | Status: SHIPPED | OUTPATIENT
Start: 2022-10-25 | End: 2022-11-08

## 2022-10-25 RX ORDER — ALPRAZOLAM 1 MG/1
1 TABLET ORAL
Qty: 30 TABLET | Refills: 0 | Status: SHIPPED | OUTPATIENT
Start: 2022-10-25

## 2022-10-28 ENCOUNTER — TELEPHONE (OUTPATIENT)
Dept: GASTROENTEROLOGY | Facility: CLINIC | Age: 47
End: 2022-10-28

## 2022-10-28 ENCOUNTER — CONSULT (OUTPATIENT)
Dept: ENDOCRINOLOGY | Facility: CLINIC | Age: 47
End: 2022-10-28
Payer: COMMERCIAL

## 2022-10-28 VITALS
SYSTOLIC BLOOD PRESSURE: 110 MMHG | DIASTOLIC BLOOD PRESSURE: 72 MMHG | TEMPERATURE: 96.8 F | WEIGHT: 108 LBS | HEIGHT: 62 IN | BODY MASS INDEX: 19.88 KG/M2

## 2022-10-28 DIAGNOSIS — M81.0 OSTEOPOROSIS, UNSPECIFIED OSTEOPOROSIS TYPE, UNSPECIFIED PATHOLOGICAL FRACTURE PRESENCE: ICD-10-CM

## 2022-10-28 DIAGNOSIS — E84.9 CYSTIC FIBROSIS (HCC): ICD-10-CM

## 2022-10-28 DIAGNOSIS — E10.22 TYPE 1 DIABETES MELLITUS WITH DIABETIC CHRONIC KIDNEY DISEASE, UNSPECIFIED CKD STAGE (HCC): Primary | ICD-10-CM

## 2022-10-28 DIAGNOSIS — E08.9 DIABETES MELLITUS RELATED TO CF (CYSTIC FIBROSIS) (HCC): ICD-10-CM

## 2022-10-28 DIAGNOSIS — E08.22 DIABETES MELLITUS DUE TO UNDERLYING CONDITION WITH CHRONIC KIDNEY DISEASE, WITH LONG-TERM CURRENT USE OF INSULIN, UNSPECIFIED CKD STAGE (HCC): ICD-10-CM

## 2022-10-28 DIAGNOSIS — E84.8 DIABETES MELLITUS RELATED TO CF (CYSTIC FIBROSIS) (HCC): ICD-10-CM

## 2022-10-28 DIAGNOSIS — N18.2 CKD (CHRONIC KIDNEY DISEASE) STAGE 2, GFR 60-89 ML/MIN: ICD-10-CM

## 2022-10-28 DIAGNOSIS — Z79.4 DIABETES MELLITUS DUE TO UNDERLYING CONDITION WITH CHRONIC KIDNEY DISEASE, WITH LONG-TERM CURRENT USE OF INSULIN, UNSPECIFIED CKD STAGE (HCC): ICD-10-CM

## 2022-10-28 DIAGNOSIS — E78.00 HYPERCHOLESTEREMIA: ICD-10-CM

## 2022-10-28 PROCEDURE — 99244 OFF/OP CNSLTJ NEW/EST MOD 40: CPT | Performed by: INTERNAL MEDICINE

## 2022-10-28 RX ORDER — BLOOD-GLUCOSE TRANSMITTER
1 EACH MISCELLANEOUS CONTINUOUS
Qty: 3 EACH | Refills: 0 | Status: SHIPPED | OUTPATIENT
Start: 2022-10-28

## 2022-10-28 RX ORDER — INSULIN GLARGINE 100 [IU]/ML
5 INJECTION, SOLUTION SUBCUTANEOUS
Qty: 10 ML | Refills: 0 | Status: SHIPPED | OUTPATIENT
Start: 2022-10-28 | End: 2022-11-02 | Stop reason: CLARIF

## 2022-10-28 RX ORDER — BLOOD-GLUCOSE,RECEIVER,CONT
1 EACH MISCELLANEOUS CONTINUOUS
Qty: 1 EACH | Refills: 0 | Status: SHIPPED | OUTPATIENT
Start: 2022-10-28

## 2022-10-28 RX ORDER — BLOOD-GLUCOSE SENSOR
1 EACH MISCELLANEOUS
Qty: 12 EACH | Refills: 0 | Status: SHIPPED | OUTPATIENT
Start: 2022-10-28

## 2022-10-28 NOTE — PROGRESS NOTES
New Consult Note      CC: Type 1 diabetes    History of Present Illness:   42 yr female with hx of cystic fibrosis diagnosed age 7 months s/p redo Lt lung transplant 3/2017 and follow up by [facility], recurrent PNA c pseudomonas and long term steroids, Type 1 diabetes, gastroparesis, pulmonary HTN, HTN, hx of DVT on apixaban, osteoporosis being managed by PCP, anemia, chronic pain and chronic glucocorticoid and immunosuppressive therapy  She has been on insulin therapy for cystic fibrosis related diabetes for about 8 years associated with insulin deficiency as established at [facility]  Never seen an endocrinologist before  Recently, she had steroid induced severe postprandial and fasting hyperglycemia requiring increased dosage of insulin and is now tapering down  Home blood glucose monitoring: uses a glucose meter and reports fasting in 130-200mg/dL and postprandial in 80-180mg/dL    Hypoglycemia: occasional    Current meds:  Glargine 5u qhs  Humalog 4u tidac    Opthamology: No  Podiatry:   vaccination:   Dental:  Pancreatitis: yes    Ace/ARB: No  Statin:No  Thyroid issues: No    Patient Active Problem List   Diagnosis   • S/P pneumonectomy   • Type 1 diabetes ([facility] [address] )   • Pancreatic insufficiency   • Asthma   • Chronic pain disorder   • DVT (deep venous thrombosis) ([facility])   • Acute on chronic anemia   • Long term current use of bisphosphonates   • Long term current use of systemic steroids   • Osteoporosis   • Anxiety   • CKD (chronic kidney disease) stage 2, GFR 60-89 ml/min   • Chronic sinusitis   • Colon polyp   • Esophageal reflux   • Hiatal hernia   • Immunosuppression (HCC)   • Severe protein-calorie malnutrition (HCC)   • Transplant rejection   • Cystic fibrosis (HCC)   • Essential hypertension   • Hypercholesteremia   • Weakness of right lower extremity   • Moderate protein-calorie malnutrition (HCC)   • Neuropathic pain of right foot   • Ulcerative pancolitis without complication ([facility] [address] )   • Gastroparesis   • Abnormal uterine bleeding (AUB)   • Jejunostomy tube present [facility])   • Pulmonary hypertension (HCC)   • Renal calculus   • Nodule of left lung   • Rib pain   • Bronchopneumonia   • Right leg pain     Past Medical History:   Diagnosis Date   • ABPA (allergic bronchopulmonary aspergillosis) ([facility] [address] ) 7/8/2012   • Acute kidney injury ([facility] [address] ) 9/8/2017   • Acute on chronic respiratory failure ([email or web address] 3/4/2017    Last Assessment & Plan:  Formatting of this note might be different from the original  Recurrent with clinical worsening with enlarging PTX, s/p multiple chest tubes, but new fevers 6/19 and worsening leukocytosis indicating infection as cause of 6/19 decompensation with profound hypoxia in setting of shunting through right lung and continued pneumothorax s/p 2nd chest tube placement  6/27: Right   • Anaphylaxis 6/6/2022   • Asthma    • Chest pain 9/21/2021   • Chronic pain    • Chronic respiratory failure with hypoxia ([email or web address] ) 9/7/2021   • COVID-19 6/3/2022   • Cystic fibrosis ([facility] [address] )    • Diabetes mellitus ([facility] [address] )    • GERD (gastroesophageal reflux disease)    • HBP (high blood pressure)    • HCAP (healthcare-associated pneumonia) 3/6/2020   • Influenza B 2/23/2020   • Kidney stone    • Neutropenic fever ([email or web address] ) 9/26/2020   • Pneumonia due to Pseudomonas species ([facility] ) 10/28/2010    Last Assessment & Plan:  Formatting of this note might be different from the original  Polymicrobial pna w/ pseudomonas, M   Abscessus, Cadida glabrata/albicans   • PONV (postoperative nausea and vomiting)    • Pseudomonas aeruginosa infection 9/20/2012   • Transplant recipient     Lung- 2013, 2017   • Wellness examination 2/4/2013    Formatting of this note might be different from the original  Discussed with patient and acknowledgement form signed on 2/4/2013 (ALYSHA)      Past Surgical History:   Procedure Laterality Date   • APPENDECTOMY     • AUGMENTATION MAMMAPLASTY Bilateral 2004   • AUGMENTATION MAMMAPLASTY Right 2021    removed    • BREAST SURGERY      Aug     • BRONCHOSCOPY     • CHOLECYSTECTOMY     • COLONOSCOPY     • EGD  07/2019    Yeast Infect      • FL RETROGRADE PYELOGRAM  1/9/2020   • FL RETROGRADE PYELOGRAM  2/4/2020   • GASTROSTOMY TUBE, PLACE     • IR PORT PLACEMENT  3/22/2022   • IR PORT REMOVAL  1/10/2022   • IR TUNNELED CENTRAL LINE PLACEMENT  1/13/2022   • LUNG SURGERY     • LUNG TRANSPLANT      X2   • AL CYSTO/URETERO W/LITHOTRIPSY &INDWELL STENT INSRT Right 2/4/2020    Procedure: CYSTOSCOPY URETEROSCOPY WITH LITHOTRIPSY HOLMIUM LASER, RETROGRADE PYELOGRAM AND INSERTION STENT URETERAL;  Surgeon: Kendrick Chung MD;  Location: [facility];  Service: Urology   • AL CYSTOURETHROSCOPY,URETER CATHETER Right 1/9/2020    Procedure: CYSTOSCOPY RETROGRADE PYELOGRAM WITH INSERTION STENT URETERAL;  Surgeon: Kendrick Chung MD;  Location: [facility];  Service: Urology   • US GUIDED VASCULAR ACCESS  1/17/2017      Family History   Problem Relation Age of Onset   • Alcohol abuse Mother    • Mental illness Mother    • Alcohol abuse Father    • COPD Maternal Grandmother    • Lung cancer Maternal Grandmother 72   • COPD Maternal Grandfather    • COPD Paternal Grandmother    • Lung cancer Paternal Grandmother 72   • No Known Problems Sister    • No Known Problems Paternal Aunt    • Breast cancer Neg Hx    • Ovarian cancer Neg Hx    • Colon cancer Neg Hx      Social History     Tobacco Use   • Smoking status: Former Smoker     Packs/day: 0 50     Years: 12 00     Pack years: 6 00   • Smokeless tobacco: Never Used   Substance Use Topics   • Alcohol use: Yes     Comment: social     Allergies   Allergen Reactions   • Bebtelovimab Anaphylaxis   • Vancomycin Angioedema and Hives   • Gabapentin Hallucinations   • Ceftazidime Headache     Severe headaches after desensitization   • Nsaids Other (See Comments)     Lung transplant increases risk of renal toxicity, call lung txp provider to discuss if needed         Current Outpatient Medications:   • acetaminophen (TYLENOL) 325 mg tablet, Take 3 tablets (975 mg total) by mouth every 8 (eight) hours, Disp: , Rfl: 0  •  albuterol (2 5 mg/3 mL) 0 083 % nebulizer solution, Take 6 mL (5 mg total) by nebulization 2 (two) times a day, Disp: , Rfl: 0  •  ALPRAZolam (XANAX) 1 mg tablet, Take 1 tablet (1 mg total) by mouth daily at bedtime as needed for anxiety or sleep, Disp: 30 tablet, Rfl: 0  •  apixaban (Eliquis) 5 mg, Take 2 tablets (10 mg total) by mouth 2 (two) times a day for 7 days, THEN 1 tablet (5 mg total) 2 (two) times a day for 23 days  , Disp: 74 tablet, Rfl: 0  •  atovaquone (MEPRON) 750 mg/5 mL suspension, Take 1,500 mg by mouth daily, Disp: , Rfl:   •  dexlansoprazole (DEXILANT) 60 MG capsule, Take 1 capsule (60 mg total) by mouth in the morning , Disp: 30 capsule, Rfl: 0  •  dronabinol (MARINOL) 5 MG capsule, TAKE 1 CAPSULE (5 MG TOTAL) BY MOUTH 3 (THREE) TIMES A DAY, Disp: 90 capsule, Rfl: 3  •  [START ON 11/1/2022] medroxyPROGESTERone (DEPO-PROVERA) 150 mg/mL injection, Inject 1 mL (150 mg total) into a muscle every 3 (three) months, Disp: 1 mL, Rfl: 3  •  oxyCODONE (ROXICODONE) 10 MG TABS, Take 1 tablet (10 mg total) by mouth every 6 (six) hours as needed for severe pain for up to 14 days Max Daily Amount: 40 mg, Disp: 56 tablet, Rfl: 0  •  pancrelipase, Lip-Prot-Amyl, (CREON) 24,000 units, Take 48,000 Units by mouth 3 (three) times a day with meals, Disp: , Rfl:   •  predniSONE 10 mg tablet, Take 10 mg by mouth daily, Disp: , Rfl:   •  pregabalin (Lyrica) 100 mg capsule, Take 1 capsule (100 mg total) by mouth 2 (two) times a day, Disp: 60 capsule, Rfl: 0  •  Respiratory Therapy Supplies (Nebulizer) SUKHI, Please dispense nebulizer machine, Disp: , Rfl:   •  tacrolimus (PROGRAF) 1 mg capsule, Take 0 5 mg by mouth 2 (two) times a day, Disp: , Rfl:   •  traZODone (DESYREL) 50 mg tablet, Take 0 5 tablets (25 mg total) by mouth daily at bedtime, Disp: , Rfl: 0  •  valACYclovir (VALTREX) 500 mg tablet, Take 500 mg by mouth daily, Disp: , Rfl:   •  BD Pen Needle Abi U/F 32G X 4 MM MISC, , Disp: , Rfl:   •  cyclobenzaprine (FLEXERIL) 5 mg tablet, Take 1-2 tabs every 8 hours as needed for muscle pain/spasm (Patient not taking: No sig reported), Disp: 90 tablet, Rfl: 0  •  famotidine (PEPCID) 40 MG tablet, TAKE 1 TABLET BY MOUTH EVERYDAY AT BEDTIME (Patient not taking: No sig reported), Disp: 90 tablet, Rfl: 1  •  HYDROmorphone (DILAUDID) 4 mg tablet, , Disp: , Rfl:   •  insulin glargine (LANTUS) 100 units/mL subcutaneous injection, Inject 8 Units under the skin (Patient not taking: No sig reported), Disp: , Rfl:   •  insulin glargine (LANTUS) 100 units/mL subcutaneous injection, Inject under the skin (Patient not taking: Reported on 10/28/2022), Disp: , Rfl:   •  insulin lispro (HumaLOG) 100 units/mL injection, Inject 4 Units under the skin 3 (three) times a day with meals (Patient not taking: Reported on 10/28/2022), Disp: , Rfl: 0  •  metoclopramide (REGLAN) 10 mg tablet, TAKE 1 TABLET BY MOUTH 3 TIMES A DAY WITH MEALS  (Patient not taking: Reported on 10/28/2022), Disp: 90 tablet, Rfl: 1  •  naloxone (NARCAN) 4 mg/0 1 mL nasal spray, 4 mg into each nostril, Disp: , Rfl:   •  sucralfate (CARAFATE) 1 g/10 mL suspension, Take 10 mL (1 g total) by mouth 4 (four) times a day (with meals and at bedtime) (Patient not taking: Reported on 10/28/2022), Disp: 420 mL, Rfl: 1    Review of Systems   Constitutional: Positive for fatigue  HENT: Negative  Eyes: Negative  Respiratory: Negative  Cardiovascular: Negative  Gastrointestinal: Negative  Endocrine: Negative  Blurred vision with severe hyperglycemia   Musculoskeletal: Negative  Skin: Negative  Allergic/Immunologic: Negative  Neurological: Negative  Hematological: Negative  Psychiatric/Behavioral: Negative  Physical Exam:  Body mass index is 19 75 kg/m²    /72 (BP Location: Left arm, Patient Position: Sitting, Cuff Size: Standard)   Temp (!) 96 8 °F (36 °C) (Tympanic)   Ht 5' 2" (1 575 m)   Wt 49 kg (108 lb)   LMP  (LMP Unknown)   BMI 19 75 kg/m²    Vitals:    10/28/22 0909   Weight: 49 kg (108 lb)        Physical Exam  Constitutional:       Appearance: She is well-developed  HENT:      Head: Normocephalic  Eyes:      Pupils: Pupils are equal, round, and reactive to light  Neck:      Thyroid: No thyromegaly  Cardiovascular:      Rate and Rhythm: Normal rate  Heart sounds: Normal heart sounds  Pulmonary:      Effort: Pulmonary effort is normal       Breath sounds: Normal breath sounds  Abdominal:      General: Bowel sounds are normal       Palpations: Abdomen is soft  Musculoskeletal:         General: No deformity  Cervical back: Normal range of motion  Skin:     General: Skin is dry  Capillary Refill: Capillary refill takes less than 2 seconds  Coloration: Skin is pale  Findings: No rash  Comments: facial flushing   Neurological:      Mental Status: She is alert and oriented to person, place, and time  Labs:   Lab Results   Component Value Date    HGBA1C 9 1 (H) 08/25/2022       No results found for: [id number], TSH, Idamae Salaam, THYROIDAB    Lab Results   Component Value Date    CREATININE 1 18 09/13/2022    CREATININE 1 67 (H) 09/12/2022    CREATININE 1 15 07/25/2022    BUN 22 09/13/2022    K 4 6 09/13/2022     09/13/2022    CO2 21 09/13/2022     eGFR   Date Value Ref Range Status   09/13/2022 56 ml/min/1 73sq m Final       Lab Results   Component Value Date    ALT 38 09/12/2022    AST 11 09/12/2022    ALKPHOS 73 09/12/2022       No results found for: CHOLESTEROL  No results found for: HDL  No results found for: TRIG  No results found for: NONHDLC      Impression:  1  Type 1 diabetes mellitus with diabetic chronic kidney disease, unspecified CKD stage ([facility] [address] )    2  Cystic fibrosis ([facility] [address] )    3   Osteoporosis, unspecified osteoporosis type, unspecified pathological fracture presence    4  CKD (chronic kidney disease) stage 2, GFR 60-89 ml/min    5  Hypercholesteremia    6  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, with long-term current use of insulin, unspecified CKD stage ([facility] )    7  Diabetes mellitus related to CF (cystic fibrosis) ([facility] )         Plan:    Diagnoses and all orders for this visit:    Type 1 diabetes mellitus with diabetic chronic kidney disease, unspecified CKD stage ([facility] )  She is uncontrolled with A1c 9 1%  Goal is 7%  Diabetes is complicated by hyperglycemia and hypoglycemia associated with steroids and insulin respectively  Today we discussed all aspects of diabetes including pathophysiology, risk factors, complications, SAGM, CGM, diet, lifestyle modifications, diabetes education, goals of therapy, follow up needs and medications including flexible insulin therapy  Advised to maintain goal blood sugars 70-180mg/dL  Advised to use listed regimen for now  In future, will aim for flexible insulin therapy and will consider a pump based on literature review  Will recommend a personal cgm  Patient requires multiple (4times) capillary fingerstick glucose tests daily  Patient requires multiple insulin injections (4 times) daily  patient changes insulin dosing based on his capillary fingersticks  Patient would therefore qualify for a continuous glucose monitor        Humalog Insulin 3 3 3    Regular, Apidra, Humalog orNovolog Sliding Scale:   <80              151-220 + 1 +1 +1    221-280 +2 +2 +2 +   281-340 +3 +3 +3 +   341-420 +4 +4 +4 +   >421 +5 +5 +5 +       Lantus Insulin    5u       Follow up in 3 months     -     Continuous Blood Gluc  (Dexcom G6 ) SUKHI; Use 1 Units continuous  -     Continuous Blood Gluc Sensor (Dexcom G6 Sensor) MISC; Use 1 Units every 7 days  -     Continuous Blood Gluc Transmit (Dexcom G6 Transmitter) MISC; Use 1 Units continuous  -     Hemoglobin A1C; Future  - Microalbumin / creatinine urine ratio; Future  -     Vitamin D 25 hydroxy; Future  -     Ambulatory referral to Diabetic Education; Future  -     insulin glargine (LANTUS) 100 units/mL subcutaneous injection; Inject 5 Units under the skin daily at bedtime  -     insulin lispro (HumaLOG) 100 units/mL injection; Inject 3 Units under the skin 3 (three) times a day with meals    Cystic fibrosis (HCC) related diabetes    Osteoporosis, unspecified osteoporosis type, unspecified pathological fracture presence    CKD (chronic kidney disease) stage 2, GFR 60-89 ml/min    Hypercholesteremia            I have spent 55 minutes with patient today in which greater than 50% of this time was spent in counseling/coordination of care  Discussed with the patient and all questioned fully answered  She will call me if any problems arise  Educated/ Counseled patient on diagnostic test results, prognosis, risk vs benefit of treatment options, importance of treatment compliance, healthy life and lifestyle choices        [address]

## 2022-10-28 NOTE — PATIENT INSTRUCTIONS
INSULIN DOSAGE INSTRUCTIONS    Name: Lucille Broderick                        : 1975  MRN #: [id number]    Your Current Insulin  and dose is: Before Breakfast Before Lunch Before Evening Meal Bedtime   Humalog Insulin 3 3 3    Regular, Apidra, Humalog orNovolog Sliding Scale:   <80              151-220 + 1 +1 +1    221-280 +2 +2 +2 +   281-340 +3 +3 +3 +   341-420 +4 +4 +4 +   >421 +5 +5 +5 +       Lantus Insulin    5u     Additional Instructions:   Please test your blood sugar:  _4_ Times per day  X_ Before Breakfast                _ Alternate Testing  X_ Before Lunch                _ 2 Hours After  Meal  X_ Before Evening Meal               _ 3 a m   x_ Before Bedtime Snack     Target Blood sugar range _70_to _180__  Call if your Siobhan Rodriguez MD  blood sugar is less than _60_ or greater than _400__      Today's Date: 10/28/2022

## 2022-10-28 NOTE — TELEPHONE ENCOUNTER
Fany Mealing  Patient can be scheduled for TIF - denial overturned  I did upload the approval letter to patients chart  Thank you! Called and spoke with pt  She does not want to schedule at this time  She will call back when she wants to get scheduled  If she calls back, just make sure with Renate Dillon that the Vickie Mccoy will still be valid  Thank you

## 2022-10-31 ENCOUNTER — TELEPHONE (OUTPATIENT)
Dept: ENDOCRINOLOGY | Facility: CLINIC | Age: 47
End: 2022-10-31

## 2022-10-31 ENCOUNTER — TELEPHONE (OUTPATIENT)
Dept: INTERNAL MEDICINE CLINIC | Facility: CLINIC | Age: 47
End: 2022-10-31

## 2022-10-31 DIAGNOSIS — K21.00 GASTROESOPHAGEAL REFLUX DISEASE WITH ESOPHAGITIS WITHOUT HEMORRHAGE: ICD-10-CM

## 2022-10-31 RX ORDER — METOCLOPRAMIDE 10 MG/1
TABLET ORAL
Qty: 90 TABLET | Refills: 1 | Status: SHIPPED | OUTPATIENT
Start: 2022-10-31

## 2022-10-31 NOTE — TELEPHONE ENCOUNTER
Versa  has called the [facility] office to schedule a virtual appointment with Dr Kate Edmond  Looked into the schedule to see next appointments for tomorrow, 11/01/2022, in the afternoon  Informed Avis Rubinstein and asked if she would like to be scheduled for today with a resident  Avis Rubinstein stated she would rather be seen by Dr Kate Edmond and scheduled an appointment for 1:00PM tomorrow, 11/01/2022  Avis Rubinstein ended the call soon after

## 2022-10-31 NOTE — TELEPHONE ENCOUNTER
Pharmacy called to change Lantus to Semglee and Humalog to Novolog  Left message for patient to let us know if pns would be ok    Order pen needles also if pens are good (patient usually does vials)

## 2022-11-01 ENCOUNTER — TELEMEDICINE (OUTPATIENT)
Dept: INTERNAL MEDICINE CLINIC | Facility: CLINIC | Age: 47
End: 2022-11-01

## 2022-11-01 DIAGNOSIS — J06.9 URI, ACUTE: Primary | ICD-10-CM

## 2022-11-01 RX ORDER — LINACLOTIDE 72 UG/1
72 CAPSULE, GELATIN COATED ORAL DAILY
COMMUNITY
Start: 2022-10-20

## 2022-11-01 RX ORDER — AMOXICILLIN AND CLAVULANATE POTASSIUM 875; 125 MG/1; MG/1
1 TABLET, FILM COATED ORAL EVERY 12 HOURS SCHEDULED
Qty: 10 TABLET | Refills: 0 | Status: SHIPPED | OUTPATIENT
Start: 2022-11-01 | End: 2022-11-06

## 2022-11-01 NOTE — PROGRESS NOTES
Virtual Regular Visit    Verification of patient location:    Patient is located in the following state in which I hold an active license [place]      Assessment/Plan:    Problem List Items Addressed This Visit        Respiratory    URI, acute - Primary    Relevant Medications    amoxicillin-clavulanate (AUGMENTIN) 875-125 mg per tablet    sodium chloride (OCEAN) 0 65 % nasal spray               Reason for visit is   Chief Complaint   Patient presents with   • Virtual Regular Visit        Encounter provider Mert Munoz MD    Provider located at [address] [place] [id number] [phone]      Recent Visits  Date Type Provider Dept   10/31/22 Telephone [facility]   Showing recent visits within past 7 days and meeting all other requirements  Today's Visits  Date Type Provider Dept   11/01/22 Telemedicine Mert Munoz MD [facility]   Showing today's visits and meeting all other requirements  Future Appointments  No visits were found meeting these conditions  Showing future appointments within next 150 days and meeting all other requirements       The patient was identified by name and date of birth  Mary Jane Hansen was informed that this is a telemedicine visit and that the visit is being conducted through the [employer]  She agrees to proceed     My office door was closed  No one else was in the room  She acknowledged consent and understanding of privacy and security of the video platform  The patient has agreed to participate and understands they can discontinue the visit at any time  Patient is aware this is a billable service  Randall Allred is a 42 y o  female seen for virtual sick visit   Patient states that for the last 3-4 days she has noted worsening nasal congestion accompanied by sore throat  She denies any cough or fevers  Nasal secretions are clear  She does note some tenderness and fullness in bilateral maxillary sinuses  Her left sinus area is more swollen than the right  She tends to sleep on her left side and notes clear drainage from her left eye as well in the morning  No recent changes to her medications  No new medications  No rashes  No recent sick contacts  She is maintained on chronic prednisone though has not had any dosage changes recently  She is s/p lung transplant and chronically immunosuppressed  Past Medical History:   Diagnosis Date   • ABPA (allergic bronchopulmonary aspergillosis) ([facility] ) 7/8/2012   • Acute kidney injury ([facility] ) 9/8/2017   • Acute on chronic respiratory failure ([facility] ) 3/4/2017    Last Assessment & Plan:  Formatting of this note might be different from the original  Recurrent with clinical worsening with enlarging PTX, s/p multiple chest tubes, but new fevers 6/19 and worsening leukocytosis indicating infection as cause of 6/19 decompensation with profound hypoxia in setting of shunting through right lung and continued pneumothorax s/p 2nd chest tube placement  6/27: Right   • Anaphylaxis 6/6/2022   • Asthma    • Chest pain 9/21/2021   • Chronic pain    • Chronic respiratory failure with hypoxia ([facility] ) 9/7/2021   • COVID-19 6/3/2022   • Cystic fibrosis ([facility] )    • Diabetes mellitus ([facility] )    • GERD (gastroesophageal reflux disease)    • HBP (high blood pressure)    • HCAP (healthcare-associated pneumonia) 3/6/2020   • Influenza B 2/23/2020   • Kidney stone    • Neutropenic fever ([facility] ) 9/26/2020   • Pneumonia due to Pseudomonas species ([facility] ) 10/28/2010    Last Assessment & Plan:  Formatting of this note might be different from the original  Polymicrobial pna w/ pseudomonas, M   Abscessus, Cadida glabrata/albicans   • PONV (postoperative nausea and vomiting)    • Pseudomonas aeruginosa infection 9/20/2012   • Transplant recipient     Lung- 2013, 2017   • Wellness examination 2/4/2013    Formatting of this note might be different from the original  Discussed with patient and acknowledgement form signed on 2/4/2013 (ALYSHA)       Past Surgical History:   Procedure Laterality Date   • APPENDECTOMY     • AUGMENTATION MAMMAPLASTY Bilateral 2004   • AUGMENTATION MAMMAPLASTY Right 2021    removed    • BREAST SURGERY      Aug     • BRONCHOSCOPY     • CHOLECYSTECTOMY     • COLONOSCOPY     • EGD  07/2019    Yeast Infect      • FL RETROGRADE PYELOGRAM  1/9/2020   • FL RETROGRADE PYELOGRAM  2/4/2020   • GASTROSTOMY TUBE, PLACE     • IR PORT PLACEMENT  3/22/2022   • IR PORT REMOVAL  1/10/2022   • IR TUNNELED CENTRAL LINE PLACEMENT  1/13/2022   • LUNG SURGERY     • LUNG TRANSPLANT      X2   • AL CYSTO/URETERO W/LITHOTRIPSY &INDWELL STENT INSRT Right 2/4/2020    Procedure: CYSTOSCOPY URETEROSCOPY WITH LITHOTRIPSY HOLMIUM LASER, RETROGRADE PYELOGRAM AND INSERTION STENT URETERAL;  Surgeon: Marylee Stare, MD;  Location: [facility];  Service: Urology   • AL CYSTOURETHROSCOPY,URETER CATHETER Right 1/9/2020    Procedure: CYSTOSCOPY RETROGRADE PYELOGRAM WITH INSERTION STENT URETERAL;  Surgeon: Marylee Stare, MD;  Location: [facility];  Service: Urology   • [facility]  1/17/2017       Current Outpatient Medications   Medication Sig Dispense Refill   • amoxicillin-clavulanate (AUGMENTIN) 875-125 mg per tablet Take 1 tablet by mouth every 12 (twelve) hours for 5 days 10 tablet 0   • linaCLOtide (Linzess) 72 MCG CAPS Take 72 mcg by mouth daily     • sodium chloride (OCEAN) 0 65 % nasal spray 2 sprays into each nostril 2 (two) times a day 30 mL 1   • acetaminophen (TYLENOL) 325 mg tablet Take 3 tablets (975 mg total) by mouth every 8 (eight) hours  0   • albuterol (2 5 mg/3 mL) 0 083 % nebulizer solution Take 6 mL (5 mg total) by nebulization 2 (two) times a day  0   • ALPRAZolam (XANAX) 1 mg tablet Take 1 tablet (1 mg total) by mouth daily at bedtime as needed for anxiety or sleep 30 tablet 0   • apixaban (Eliquis) 5 mg Take 2 tablets (10 mg total) by mouth 2 (two) times a day for 7 days, THEN 1 tablet (5 mg total) 2 (two) times a day for 23 days  74 tablet 0   • atovaquone (MEPRON) 750 mg/5 mL suspension Take 1,500 mg by mouth daily     • BD Pen Needle Abi U/F 32G X 4 MM MISC      • Continuous Blood Gluc  (Dexcom G6 ) SUKHI Use 1 Units continuous 1 each 0   • Continuous Blood Gluc Sensor (Dexcom G6 Sensor) MISC Use 1 Units every 7 days 12 each 0   • Continuous Blood Gluc Transmit (Dexcom G6 Transmitter) MISC Use 1 Units continuous 3 each 0   • cyclobenzaprine (FLEXERIL) 5 mg tablet Take 1-2 tabs every 8 hours as needed for muscle pain/spasm (Patient not taking: No sig reported) 90 tablet 0   • dexlansoprazole (DEXILANT) 60 MG capsule Take 1 capsule (60 mg total) by mouth in the morning   30 capsule 0   • dronabinol (MARINOL) 5 MG capsule TAKE 1 CAPSULE (5 MG TOTAL) BY MOUTH 3 (THREE) TIMES A DAY 90 capsule 3   • famotidine (PEPCID) 40 MG tablet TAKE 1 TABLET BY MOUTH EVERYDAY AT BEDTIME (Patient not taking: No sig reported) 90 tablet 1   • HYDROmorphone (DILAUDID) 4 mg tablet  (Patient not taking: Reported on 10/28/2022)     • insulin glargine (LANTUS) 100 units/mL subcutaneous injection Inject 5 Units under the skin daily at bedtime 10 mL 0   • insulin lispro (HumaLOG) 100 units/mL injection Inject 3 Units under the skin 3 (three) times a day with meals 15 mL 0   • medroxyPROGESTERone (DEPO-PROVERA) 150 mg/mL injection Inject 1 mL (150 mg total) into a muscle every 3 (three) months 1 mL 3   • metoclopramide (REGLAN) 10 mg tablet TAKE 1 TABLET BY MOUTH THREE TIMES A DAY WITH MEALS 90 tablet 1   • naloxone (NARCAN) 4 mg/0 1 mL nasal spray 4 mg into each nostril     • oxyCODONE (ROXICODONE) 10 MG TABS Take 1 tablet (10 mg total) by mouth every 6 (six) hours as needed for severe pain for up to 14 days Max Daily Amount: 40 mg 56 tablet 0   • pancrelipase, Lip-Prot-Amyl, (CREON) 24,000 units Take 48,000 Units by mouth 3 (three) times a day with meals     • predniSONE 10 mg tablet Take 10 mg by mouth daily     • pregabalin (Lyrica) 100 mg capsule Take 1 capsule (100 mg total) by mouth 2 (two) times a day 60 capsule 0   • Respiratory Therapy Supplies (Nebulizer) SUKHI Please dispense nebulizer machine     • sucralfate (CARAFATE) 1 g/10 mL suspension Take 10 mL (1 g total) by mouth 4 (four) times a day (with meals and at bedtime) (Patient not taking: Reported on 10/28/2022) 420 mL 1   • tacrolimus (PROGRAF) 1 mg capsule Take 0 5 mg by mouth 2 (two) times a day     • traZODone (DESYREL) 50 mg tablet Take 0 5 tablets (25 mg total) by mouth daily at bedtime  0   • valACYclovir (VALTREX) 500 mg tablet Take 500 mg by mouth daily       No current facility-administered medications for this visit  Allergies   Allergen Reactions   • Bebtelovimab Anaphylaxis   • Vancomycin Angioedema and Hives   • Gabapentin Hallucinations   • Ceftazidime Headache     Severe headaches after desensitization   • Nsaids Other (See Comments)     Lung transplant increases risk of renal toxicity, call lung txp provider to discuss if needed       Review of Systems    Video Exam    There were no vitals filed for this visit      Physical Exam  Constitutional:       Comments: Facial swelling noted, L>R          I spent 15 minutes directly with the patient during this visit

## 2022-11-02 ENCOUNTER — TELEPHONE (OUTPATIENT)
Dept: ENDOCRINOLOGY | Facility: CLINIC | Age: 47
End: 2022-11-02

## 2022-11-02 DIAGNOSIS — E10.22 TYPE 1 DIABETES MELLITUS WITH DIABETIC CHRONIC KIDNEY DISEASE, UNSPECIFIED CKD STAGE (HCC): Primary | ICD-10-CM

## 2022-11-02 NOTE — TELEPHONE ENCOUNTER
[facility] pharmacy is requesting a prior authorization for Dexcom    [place]

## 2022-11-02 NOTE — TELEPHONE ENCOUNTER
PA submitted for Dexcom    BETONY ZELONIS-GECEK Key: [id number] - [place] Case ID: [id number] - Rx #: [id number]  Need help? Call us at [phone]    Status   [facility]   [employer] Electronic Prior Authorization Form (8319 NCPDP)           [address]    [address]

## 2022-11-03 ENCOUNTER — TELEPHONE (OUTPATIENT)
Dept: GASTROENTEROLOGY | Facility: CLINIC | Age: 47
End: 2022-11-03

## 2022-11-03 RX ORDER — INSULIN ASPART 100 [IU]/ML
INJECTION, SOLUTION INTRAVENOUS; SUBCUTANEOUS
Qty: 15 ML | Refills: 3 | Status: SHIPPED | OUTPATIENT
Start: 2022-11-03 | End: 2022-11-08 | Stop reason: SDUPTHER

## 2022-11-03 RX ORDER — INSULIN GLARGINE 100 [IU]/ML
INJECTION, SOLUTION SUBCUTANEOUS
Qty: 15 ML | Refills: 3 | Status: SHIPPED | OUTPATIENT
Start: 2022-11-03 | End: 2022-11-08 | Stop reason: SDUPTHER

## 2022-11-03 RX ORDER — ALCOHOL ANTISEPTIC PADS
PADS, MEDICATED (EA) TOPICAL
Qty: 100 EACH | Refills: 3 | Status: SHIPPED | OUTPATIENT
Start: 2022-11-03

## 2022-11-03 NOTE — TELEPHONE ENCOUNTER
Recall ltr for egd with Dr Vickie Aly due to hx of gerd, ,gastroparesis, incomplete egd due 11/13/22

## 2022-11-08 ENCOUNTER — APPOINTMENT (EMERGENCY)
Dept: CT IMAGING | Facility: HOSPITAL | Age: 47
End: 2022-11-08

## 2022-11-08 ENCOUNTER — HOSPITAL ENCOUNTER (OUTPATIENT)
Facility: HOSPITAL | Age: 47
Setting detail: OBSERVATION
Discharge: ANOTHER HOSPITAL | End: 2022-11-10
Attending: EMERGENCY MEDICINE | Admitting: INTERNAL MEDICINE

## 2022-11-08 ENCOUNTER — APPOINTMENT (EMERGENCY)
Dept: RADIOLOGY | Facility: HOSPITAL | Age: 47
End: 2022-11-08

## 2022-11-08 ENCOUNTER — TELEPHONE (OUTPATIENT)
Dept: INTERNAL MEDICINE CLINIC | Facility: CLINIC | Age: 47
End: 2022-11-08

## 2022-11-08 DIAGNOSIS — I26.99 PULMONARY EMBOLUS (HCC): Primary | ICD-10-CM

## 2022-11-08 DIAGNOSIS — E10.22 TYPE 1 DIABETES MELLITUS WITH DIABETIC CHRONIC KIDNEY DISEASE, UNSPECIFIED CKD STAGE (HCC): ICD-10-CM

## 2022-11-08 DIAGNOSIS — D64.9 ACUTE ANEMIA: ICD-10-CM

## 2022-11-08 LAB
2HR DELTA HS TROPONIN: 0 NG/L
ABO GROUP BLD: NORMAL
ALBUMIN SERPL BCP-MCNC: 2.7 G/DL (ref 3.5–5)
ALP SERPL-CCNC: 145 U/L (ref 46–116)
ALT SERPL W P-5'-P-CCNC: 18 U/L (ref 12–78)
ANION GAP SERPL CALCULATED.3IONS-SCNC: 12 MMOL/L (ref 4–13)
APTT PPP: 26 SECONDS (ref 23–37)
AST SERPL W P-5'-P-CCNC: 32 U/L (ref 5–45)
BASOPHILS # BLD AUTO: 0.01 THOUSANDS/ÂΜL (ref 0–0.1)
BASOPHILS NFR BLD AUTO: 0 % (ref 0–1)
BILIRUB SERPL-MCNC: 0.6 MG/DL (ref 0.2–1)
BLD GP AB SCN SERPL QL: NEGATIVE
BUN SERPL-MCNC: 19 MG/DL (ref 5–25)
CALCIUM ALBUM COR SERPL-MCNC: 8.9 MG/DL (ref 8.3–10.1)
CALCIUM SERPL-MCNC: 7.9 MG/DL (ref 8.3–10.1)
CARDIAC TROPONIN I PNL SERPL HS: 5 NG/L
CARDIAC TROPONIN I PNL SERPL HS: 5 NG/L
CHLORIDE SERPL-SCNC: 103 MMOL/L (ref 96–108)
CO2 SERPL-SCNC: 19 MMOL/L (ref 21–32)
CREAT SERPL-MCNC: 1.17 MG/DL (ref 0.6–1.3)
EOSINOPHIL # BLD AUTO: 0.17 THOUSAND/ÂΜL (ref 0–0.61)
EOSINOPHIL NFR BLD AUTO: 3 % (ref 0–6)
ERYTHROCYTE [DISTWIDTH] IN BLOOD BY AUTOMATED COUNT: 18.6 % (ref 11.6–15.1)
FLUAV RNA RESP QL NAA+PROBE: NEGATIVE
FLUBV RNA RESP QL NAA+PROBE: NEGATIVE
GFR SERPL CREATININE-BSD FRML MDRD: 56 ML/MIN/1.73SQ M
GLUCOSE SERPL-MCNC: 126 MG/DL (ref 65–140)
HCT VFR BLD AUTO: 22 % (ref 34.8–46.1)
HCT VFR BLD AUTO: 22.6 % (ref 34.8–46.1)
HGB BLD-MCNC: 6 G/DL (ref 11.5–15.4)
HGB BLD-MCNC: 6.3 G/DL (ref 11.5–15.4)
IMM GRANULOCYTES # BLD AUTO: 0.02 THOUSAND/UL (ref 0–0.2)
IMM GRANULOCYTES NFR BLD AUTO: 0 % (ref 0–2)
INR PPP: 0.94 (ref 0.84–1.19)
LYMPHOCYTES # BLD AUTO: 1.96 THOUSANDS/ÂΜL (ref 0.6–4.47)
LYMPHOCYTES NFR BLD AUTO: 30 % (ref 14–44)
MAGNESIUM SERPL-MCNC: 1.5 MG/DL (ref 1.6–2.6)
MCH RBC QN AUTO: 20.9 PG (ref 26.8–34.3)
MCHC RBC AUTO-ENTMCNC: 27.9 G/DL (ref 31.4–37.4)
MCV RBC AUTO: 75 FL (ref 82–98)
MONOCYTES # BLD AUTO: 1.03 THOUSAND/ÂΜL (ref 0.17–1.22)
MONOCYTES NFR BLD AUTO: 16 % (ref 4–12)
NEUTROPHILS # BLD AUTO: 3.41 THOUSANDS/ÂΜL (ref 1.85–7.62)
NEUTS SEG NFR BLD AUTO: 51 % (ref 43–75)
NRBC BLD AUTO-RTO: 0 /100 WBCS
NT-PROBNP SERPL-MCNC: 2256 PG/ML
PLATELET # BLD AUTO: 206 THOUSANDS/UL (ref 149–390)
PMV BLD AUTO: 10.7 FL (ref 8.9–12.7)
POTASSIUM SERPL-SCNC: 4.4 MMOL/L (ref 3.5–5.3)
PROT SERPL-MCNC: 6.7 G/DL (ref 6.4–8.4)
PROTHROMBIN TIME: 12.4 SECONDS (ref 11.6–14.5)
RBC # BLD AUTO: 3.01 MILLION/UL (ref 3.81–5.12)
RH BLD: NEGATIVE
RSV RNA RESP QL NAA+PROBE: NEGATIVE
SARS-COV-2 RNA RESP QL NAA+PROBE: NEGATIVE
SODIUM SERPL-SCNC: 134 MMOL/L (ref 135–147)
SPECIMEN EXPIRATION DATE: NORMAL
WBC # BLD AUTO: 6.6 THOUSAND/UL (ref 4.31–10.16)

## 2022-11-08 RX ORDER — MORPHINE SULFATE 4 MG/ML
4 INJECTION, SOLUTION INTRAMUSCULAR; INTRAVENOUS ONCE
Status: COMPLETED | OUTPATIENT
Start: 2022-11-08 | End: 2022-11-08

## 2022-11-08 RX ORDER — INSULIN GLARGINE 100 [IU]/ML
INJECTION, SOLUTION SUBCUTANEOUS
Qty: 15 ML | Refills: 3 | Status: SHIPPED | OUTPATIENT
Start: 2022-11-08

## 2022-11-08 RX ORDER — ALBUTEROL SULFATE 2.5 MG/3ML
5 SOLUTION RESPIRATORY (INHALATION) 2 TIMES DAILY PRN
Status: DISCONTINUED | OUTPATIENT
Start: 2022-11-08 | End: 2022-11-10 | Stop reason: HOSPADM

## 2022-11-08 RX ORDER — TACROLIMUS 0.5 MG/1
0.5 CAPSULE ORAL 2 TIMES DAILY
Status: DISCONTINUED | OUTPATIENT
Start: 2022-11-09 | End: 2022-11-10 | Stop reason: HOSPADM

## 2022-11-08 RX ORDER — VALACYCLOVIR HYDROCHLORIDE 500 MG/1
500 TABLET, FILM COATED ORAL DAILY
Status: DISCONTINUED | OUTPATIENT
Start: 2022-11-09 | End: 2022-11-10 | Stop reason: HOSPADM

## 2022-11-08 RX ORDER — INSULIN LISPRO 100 [IU]/ML
3 INJECTION, SOLUTION INTRAVENOUS; SUBCUTANEOUS
Refills: 3 | Status: DISCONTINUED | OUTPATIENT
Start: 2022-11-09 | End: 2022-11-10 | Stop reason: HOSPADM

## 2022-11-08 RX ORDER — ALPRAZOLAM 0.5 MG/1
1 TABLET ORAL
Status: DISCONTINUED | OUTPATIENT
Start: 2022-11-08 | End: 2022-11-10 | Stop reason: HOSPADM

## 2022-11-08 RX ORDER — ACETAMINOPHEN 325 MG/1
650 TABLET ORAL EVERY 6 HOURS PRN
Status: DISCONTINUED | OUTPATIENT
Start: 2022-11-08 | End: 2022-11-10 | Stop reason: HOSPADM

## 2022-11-08 RX ORDER — HYDROMORPHONE HCL/PF 1 MG/ML
0.5 SYRINGE (ML) INJECTION ONCE
Status: COMPLETED | OUTPATIENT
Start: 2022-11-08 | End: 2022-11-08

## 2022-11-08 RX ORDER — OXYCODONE HYDROCHLORIDE 10 MG/1
10 TABLET ORAL EVERY 6 HOURS PRN
Status: DISCONTINUED | OUTPATIENT
Start: 2022-11-08 | End: 2022-11-09

## 2022-11-08 RX ORDER — DRONABINOL 2.5 MG/1
5 CAPSULE ORAL 3 TIMES DAILY
Status: DISCONTINUED | OUTPATIENT
Start: 2022-11-08 | End: 2022-11-10 | Stop reason: HOSPADM

## 2022-11-08 RX ORDER — FENTANYL CITRATE 50 UG/ML
50 INJECTION, SOLUTION INTRAMUSCULAR; INTRAVENOUS ONCE
Status: COMPLETED | OUTPATIENT
Start: 2022-11-08 | End: 2022-11-08

## 2022-11-08 RX ORDER — INSULIN GLARGINE 100 [IU]/ML
3 INJECTION, SOLUTION SUBCUTANEOUS
Refills: 3 | Status: DISCONTINUED | OUTPATIENT
Start: 2022-11-08 | End: 2022-11-10 | Stop reason: HOSPADM

## 2022-11-08 RX ORDER — METOCLOPRAMIDE 10 MG/1
10 TABLET ORAL
Status: DISCONTINUED | OUTPATIENT
Start: 2022-11-09 | End: 2022-11-10 | Stop reason: HOSPADM

## 2022-11-08 RX ORDER — DOCUSATE SODIUM 100 MG/1
100 CAPSULE, LIQUID FILLED ORAL 2 TIMES DAILY
Status: DISCONTINUED | OUTPATIENT
Start: 2022-11-09 | End: 2022-11-10 | Stop reason: HOSPADM

## 2022-11-08 RX ORDER — PEN NEEDLE, DIABETIC 32GX 5/32"
NEEDLE, DISPOSABLE MISCELLANEOUS
Qty: 400 EACH | Refills: 1 | Status: SHIPPED | OUTPATIENT
Start: 2022-11-08

## 2022-11-08 RX ORDER — MAGNESIUM SULFATE HEPTAHYDRATE 40 MG/ML
2 INJECTION, SOLUTION INTRAVENOUS ONCE
Status: COMPLETED | OUTPATIENT
Start: 2022-11-08 | End: 2022-11-08

## 2022-11-08 RX ORDER — PANTOPRAZOLE SODIUM 40 MG/1
40 TABLET, DELAYED RELEASE ORAL
Refills: 0 | Status: DISCONTINUED | OUTPATIENT
Start: 2022-11-09 | End: 2022-11-10 | Stop reason: HOSPADM

## 2022-11-08 RX ORDER — INSULIN LISPRO 100 [IU]/ML
1-5 INJECTION, SOLUTION INTRAVENOUS; SUBCUTANEOUS
Status: DISCONTINUED | OUTPATIENT
Start: 2022-11-08 | End: 2022-11-10 | Stop reason: HOSPADM

## 2022-11-08 RX ORDER — PREDNISONE 10 MG/1
10 TABLET ORAL DAILY
Status: DISCONTINUED | OUTPATIENT
Start: 2022-11-09 | End: 2022-11-10 | Stop reason: HOSPADM

## 2022-11-08 RX ORDER — ATOVAQUONE 750 MG/5ML
1500 SUSPENSION ORAL DAILY
Status: DISCONTINUED | OUTPATIENT
Start: 2022-11-09 | End: 2022-11-10 | Stop reason: HOSPADM

## 2022-11-08 RX ORDER — INSULIN ASPART 100 [IU]/ML
INJECTION, SOLUTION INTRAVENOUS; SUBCUTANEOUS
Qty: 15 ML | Refills: 3 | Status: SHIPPED | OUTPATIENT
Start: 2022-11-08

## 2022-11-08 RX ORDER — PREGABALIN 100 MG/1
100 CAPSULE ORAL 2 TIMES DAILY
Status: DISCONTINUED | OUTPATIENT
Start: 2022-11-09 | End: 2022-11-10 | Stop reason: HOSPADM

## 2022-11-08 RX ADMIN — HYDROMORPHONE HYDROCHLORIDE 0.5 MG: 1 INJECTION, SOLUTION INTRAMUSCULAR; INTRAVENOUS; SUBCUTANEOUS at 20:50

## 2022-11-08 RX ADMIN — MAGNESIUM SULFATE HEPTAHYDRATE 2 G: 40 INJECTION, SOLUTION INTRAVENOUS at 19:25

## 2022-11-08 RX ADMIN — FENTANYL CITRATE 50 MCG: 50 INJECTION INTRAMUSCULAR; INTRAVENOUS at 18:02

## 2022-11-08 RX ADMIN — MORPHINE SULFATE 4 MG: 4 INJECTION INTRAVENOUS at 19:46

## 2022-11-08 RX ADMIN — IOHEXOL 85 ML: 350 INJECTION, SOLUTION INTRAVENOUS at 19:35

## 2022-11-08 NOTE — TELEPHONE ENCOUNTER
I did call to this patient in response to her my chart message which Dr Maurizio Munroe did respond to in My Chart  I did leave a voicemail and I did explain Dr Jules  concern-advised on voicemail for patient to be evaluated in the ER for arm swelling  Instructed to call office if further questions or concerns

## 2022-11-08 NOTE — ASSESSMENT & PLAN NOTE
This is a 45years old female with history of bilateral lung transplant, recent history of intubation and mechanical ventilation, she was transferred to [facility] for better management  She was on IV antibiotics and ECMO over there  She was discharged home on March 25th on 4 L oxygen  She was given minocycline for 1 month  Now this morning she was having more short of breath and hypoxia  As per patient her O2 sat was 40 at home  In the ER initially she was placed on 6 L oxygen and now currently back to for L  Saturating well  Chest x-ray showed right-sided infiltrate  Emergency room physician called her transplant center and discussed the case  Recommended to send COVID and monitor here  No need to transfer now  Patient been admitted for further management  Discussed personally with Pulmonary and Infectious Disease Service  Given her prolonged history an immunocompromised status, will start her on meropenem  Continue minocycline  Follow up the cultures  Her covid 19 came back negative  Discussed personally with [address]  She would be transferred there for further management  Patient wants me to give a letter for work saying her covid test is negative  Letter was given  Ndc (200 Mg Prefilled Syringe): [address]-72 Ndc (200 Mg Prefilled Syringe): [address]-24 2019 02:45

## 2022-11-08 NOTE — ED PROVIDER NOTES
History  Chief Complaint   Patient presents with   • Arm Swelling     Patient to ED due to bilateral arm swelling  Pt has hx blood clots due to ecmo  Pt recently discharged after blood clot in R leg  Pt was told by PCP to come to ED due hx of blood clots  R Pneumomectomy in October 2021  Taking Eliquis BID  Shortness of Breath on exertion  Denies chest pain and n/v/d  No acute distress noted  This is a 79-year-old female patient with history of cystic fibrosis status post bilateral lung transplant as well as pneumonectomy about 1 year ago  Follows with [facility] transplant team   She was admitted in August until the 1st week of September for MSSA as well as Pseudomonas pneumonia  Subsequently admitted again from the middle to the end of September for fungal pneumonia  She is presenting to the ED today with 3 weeks of bilateral upper extremity swelling  Painful in her shoulders  No numbness tingling or weakness  She also notes increasing shortness of breath with exertion, even minimal exertion at this point  She was recently diagnosed with DVT in the right lower extremity  She still has swelling in this leg  She is currently on Eliquis  She notes she was briefly taken off Eliquis when this occurred but is now restarted  History provided by:  Patient   used: No    Medical Problem  Location:  Bilateral UE swelling  Severity:  Moderate  Onset quality:  Gradual  Duration:  3 weeks  Timing:  Constant  Progression:  Worsening  Chronicity:  New  Associated symptoms: shortness of breath    Associated symptoms: no abdominal pain, no chest pain, no cough, no ear pain, no fever, no rash, no sore throat and no vomiting        Prior to Admission Medications   Prescriptions Last Dose Informant Patient Reported? Taking?    ALPRAZolam (XANAX) 1 mg tablet   No No   Sig: Take 1 tablet (1 mg total) by mouth daily at bedtime as needed for anxiety or sleep   BD Pen Needle Abi U/F 32G X 4 MM MISC   No No   Si times daily   Continuous Blood Gluc  (Dexcom G6 ) SUKHI   No No   Sig: Use 1 Units continuous   Continuous Blood Gluc Sensor (Dexcom G6 Sensor) MISC   No No   Sig: Use 1 Units every 7 days   Continuous Blood Gluc Transmit (Dexcom G6 Transmitter) MISC   No No   Sig: Use 1 Units continuous   Insulin Glargine Solostar (Semglee) 100 UNIT/ML SOPN   No No   Sig: Inject 5 units under the skin every bedtime   Insulin Pen Needle (Comfort EZ Pen Needles) 33G X 4 MM MISC   No No   Sig: Use to inject insulin 4 times a day   Respiratory Therapy Supplies (Nebulizer) SUKHI  Self Yes No   Sig: Please dispense nebulizer machine   acetaminophen (TYLENOL) 325 mg tablet   No No   Sig: Take 3 tablets (975 mg total) by mouth every 8 (eight) hours   albuterol (2 5 mg/3 mL) 0 083 % nebulizer solution  Self No No   Sig: Take 6 mL (5 mg total) by nebulization 2 (two) times a day   apixaban (Eliquis) 5 mg   No No   Sig: Take 2 tablets (10 mg total) by mouth 2 (two) times a day for 7 days, THEN 1 tablet (5 mg total) 2 (two) times a day for 23 days  atovaquone (MEPRON) 750 mg/5 mL suspension  Self Yes No   Sig: Take 1,500 mg by mouth daily   dexlansoprazole (DEXILANT) 60 MG capsule  Self No No   Sig: Take 1 capsule (60 mg total) by mouth in the morning     dronabinol (MARINOL) 5 MG capsule   No No   Sig: TAKE 1 CAPSULE (5 MG TOTAL) BY MOUTH 3 (THREE) TIMES A DAY   insulin aspart (NovoLOG FlexPen) 100 UNIT/ML injection pen   No No   Sig: Inject 3 units three times a day with meals   linaCLOtide (Linzess) 72 MCG CAPS   Yes No   Sig: Take 72 mcg by mouth daily   medroxyPROGESTERone (DEPO-PROVERA) 150 mg/mL injection  Self No No   Sig: Inject 1 mL (150 mg total) into a muscle every 3 (three) months   metoclopramide (REGLAN) 10 mg tablet   No No   Sig: TAKE 1 TABLET BY MOUTH THREE TIMES A DAY WITH MEALS   naloxone (NARCAN) 4 mg/0 1 mL nasal spray  Self Yes No   Si mg into each nostril   oxyCODONE (ROXICODONE) 10 MG TABS   No No   Sig: Take 1 tablet (10 mg total) by mouth every 6 (six) hours as needed for severe pain for up to 14 days Max Daily Amount: 40 mg   pancrelipase, Lip-Prot-Amyl, (CREON) 24,000 units  Self Yes No   Sig: Take 48,000 Units by mouth 3 (three) times a day with meals   predniSONE 10 mg tablet  Self Yes No   Sig: Take 10 mg by mouth daily   pregabalin (Lyrica) 100 mg capsule   No No   Sig: Take 1 capsule (100 mg total) by mouth 2 (two) times a day   sodium chloride (OCEAN) 0 65 % nasal spray   No No   Si sprays into each nostril 2 (two) times a day   tacrolimus (PROGRAF) 1 mg capsule  Self Yes No   Sig: Take 0 5 mg by mouth 2 (two) times a day   valACYclovir (VALTREX) 500 mg tablet  Self Yes No   Sig: Take 500 mg by mouth daily      Facility-Administered Medications: None       Past Medical History:   Diagnosis Date   • ABPA (allergic bronchopulmonary aspergillosis) ([place] [address] ) 2012   • Acute kidney injury ([place] [address] ) 2017   • Acute on chronic respiratory failure ([place] [address] ) 3/4/2017    Last Assessment & Plan:  Formatting of this note might be different from the original  Recurrent with clinical worsening with enlarging PTX, s/p multiple chest tubes, but new fevers  and worsening leukocytosis indicating infection as cause of  decompensation with profound hypoxia in setting of shunting through right lung and continued pneumothorax s/p 2nd chest tube placement    : Right   • Anaphylaxis 2022   • Asthma    • Chest pain 2021   • Chronic pain    • Chronic respiratory failure with hypoxia ([place] [address] ) 2021   • COVID-19 6/3/2022   • Cystic fibrosis ([place] [address] )    • Diabetes mellitus ([place] [address] )    • GERD (gastroesophageal reflux disease)    • HBP (high blood pressure)    • HCAP (healthcare-associated pneumonia) 3/6/2020   • Influenza B 2020   • Kidney stone    • Neutropenic fever ([facility] ) 2020   • Pneumonia due to Pseudomonas species ([place] [address] ) 10/28/2010    Last Assessment & Plan:  Formatting of this note might be different from the original  Polymicrobial pna w/ pseudomonas, M  Abscessus, Cadida glabrata/albicans   • PONV (postoperative nausea and vomiting)    • Pseudomonas aeruginosa infection 9/20/2012   • Transplant recipient     Lung- 2013, 2017   • Wellness examination 2/4/2013    Formatting of this note might be different from the original  Discussed with patient and acknowledgement form signed on 2/4/2013 (ALYSHA)       Past Surgical History:   Procedure Laterality Date   • APPENDECTOMY     • AUGMENTATION MAMMAPLASTY Bilateral 2004   • AUGMENTATION MAMMAPLASTY Right 2021    removed    • BREAST SURGERY      Aug     • BRONCHOSCOPY     • CHOLECYSTECTOMY     • COLONOSCOPY     • EGD  07/2019    Yeast Infect      • FL RETROGRADE PYELOGRAM  1/9/2020   • FL RETROGRADE PYELOGRAM  2/4/2020   • GASTROSTOMY TUBE, PLACE     • IR PORT PLACEMENT  3/22/2022   • IR PORT REMOVAL  1/10/2022   • IR TUNNELED CENTRAL LINE PLACEMENT  1/13/2022   • LUNG SURGERY     • LUNG TRANSPLANT      X2   • CT CYSTO/URETERO W/LITHOTRIPSY &INDWELL STENT INSRT Right 2/4/2020    Procedure: CYSTOSCOPY URETEROSCOPY WITH LITHOTRIPSY HOLMIUM LASER, RETROGRADE PYELOGRAM AND INSERTION STENT URETERAL;  Surgeon: Isela Mcallister MD;  Location: [facility];  Service: Urology   • CT CYSTOURETHROSCOPY,URETER CATHETER Right 1/9/2020    Procedure: CYSTOSCOPY RETROGRADE PYELOGRAM WITH INSERTION STENT URETERAL;  Surgeon: Isela Mcallister MD;  Location: [facility];  Service: Urology   • US GUIDED VASCULAR ACCESS  1/17/2017       Family History   Problem Relation Age of Onset   • Alcohol abuse Mother    • Mental illness Mother    • Alcohol abuse Father    • COPD Maternal Grandmother    • Lung cancer Maternal Grandmother 72   • COPD Maternal Grandfather    • COPD Paternal Grandmother    • Lung cancer Paternal Grandmother 72   • No Known Problems Sister    • No Known Problems Paternal Aunt    • Breast cancer Neg Hx    • Ovarian cancer Neg Hx    • Colon cancer Neg Hx I have reviewed and agree with the history as documented  E-Cigarette/Vaping   • E-Cigarette Use Never User      E-Cigarette/Vaping Substances     Social History     Tobacco Use   • Smoking status: Former Smoker     Packs/day: 0 50     Years: 12 00     Pack years: 6 00   • Smokeless tobacco: Never Used   Vaping Use   • Vaping Use: Never used   Substance Use Topics   • Alcohol use: Not Currently     Comment: social   • Drug use: Yes     Frequency: 2 0 times per week     Types: Marijuana     Comment: medical edible marijuana prescribed       Review of Systems   Constitutional: Negative for chills and fever  HENT: Negative for ear pain and sore throat  Eyes: Negative for pain and visual disturbance  Respiratory: Positive for shortness of breath  Negative for cough  Cardiovascular: Negative for chest pain and palpitations  Arm swelling     Gastrointestinal: Negative for abdominal pain and vomiting  Genitourinary: Negative for dysuria and hematuria  Musculoskeletal: Negative for arthralgias and back pain  Skin: Negative for color change and rash  Neurological: Negative for seizures and syncope  All other systems reviewed and are negative  Physical Exam  Physical Exam  Vitals and nursing note reviewed  Constitutional:       General: She is not in acute distress  Appearance: She is well-developed  HENT:      Head: Normocephalic and atraumatic  Eyes:      Conjunctiva/sclera: Conjunctivae normal    Cardiovascular:      Rate and Rhythm: Normal rate and regular rhythm  Heart sounds: No murmur heard  Pulmonary:      Effort: Pulmonary effort is normal  No respiratory distress  Breath sounds: Normal breath sounds  Abdominal:      Palpations: Abdomen is soft  Tenderness: There is no abdominal tenderness  Musculoskeletal:      Cervical back: Neck supple  Skin:     General: Skin is warm and dry  Neurological:      Mental Status: She is alert           Vital Signs  ED Triage Vitals   Temperature Pulse Respirations Blood Pressure SpO2   11/08/22 1545 11/08/22 1545 11/08/22 1545 11/08/22 1545 11/08/22 1545   98 9 °F (37 2 °C) (!) 106 18 130/65 100 %      Temp Source Heart Rate Source Patient Position - Orthostatic VS BP Location FiO2 (%)   11/08/22 1545 11/08/22 1545 11/08/22 1545 11/08/22 1545 --   Temporal Monitor Sitting Left arm       Pain Score       11/08/22 1705       7           Vitals:    11/08/22 1715 11/08/22 1956 11/08/22 2000 11/08/22 2100   BP: 130/71 113/62 110/63 103/63   Pulse: 97 91 86 82   Patient Position - Orthostatic VS: Lying Lying Lying Lying         Visual Acuity      ED Medications  Medications   albuterol inhalation solution 5 mg (has no administration in time range)   ALPRAZolam (XANAX) tablet 1 mg (has no administration in time range)   atovaquone (MEPRON) oral suspension 1,500 mg (has no administration in time range)   pantoprazole (PROTONIX) EC tablet 40 mg (has no administration in time range)   dronabinol (MARINOL) capsule 5 mg (has no administration in time range)   insulin lispro (HumaLOG) 100 units/mL subcutaneous injection 3 Units (has no administration in time range)   insulin glargine (LANTUS) subcutaneous injection 3 Units 0 03 mL (has no administration in time range)   linaCLOtide CAPS 72 mcg (has no administration in time range)   metoclopramide (REGLAN) tablet 10 mg (has no administration in time range)   oxyCODONE (ROXICODONE) immediate release tablet 10 mg (has no administration in time range)   pancrelipase (Lip-Prot-Amyl) (CREON) delayed release capsule 48,000 Units (has no administration in time range)   predniSONE tablet 10 mg (has no administration in time range)   pregabalin (LYRICA) capsule 100 mg (has no administration in time range)   tacrolimus (PROGRAF) capsule 0 5 mg (has no administration in time range)   valACYclovir (VALTREX) tablet 500 mg (has no administration in time range)   acetaminophen (TYLENOL) tablet 650 mg (has no administration in time range)   docusate sodium (COLACE) capsule 100 mg (has no administration in time range)   insulin lispro (HumaLOG) 100 units/mL subcutaneous injection 1-5 Units (has no administration in time range)   fentanyl citrate (PF) 100 MCG/2ML 50 mcg (50 mcg Intravenous Given 11/8/22 1802)   magnesium sulfate 2 g/50 mL IVPB (premix) 2 g (0 g Intravenous Stopped 11/8/22 2025)   morphine injection 4 mg (4 mg Intravenous Given 11/8/22 1946)   iohexol (OMNIPAQUE) 350 MG/ML injection (SINGLE-DOSE) 85 mL (85 mL Intravenous Given 11/8/22 1935)   HYDROmorphone (DILAUDID) injection 0 5 mg (0 5 mg Intravenous Given 11/8/22 2050)       Diagnostic Studies  Results Reviewed     Procedure Component Value Units Date/Time    CBC (With Platelets) [[id number]]     Lab Status: No result Specimen: Blood     Hemoglobin and hematocrit, blood [[id number]]  (Abnormal) Collected: 11/08/22 2121    Lab Status: Final result Specimen: Blood from Hand, Right Updated: 11/08/22 2226     Hemoglobin 6 0 g/dL      Hematocrit 22 0 %     HS Troponin I 2hr [[id number]]  (Normal) Collected: 11/08/22 2111    Lab Status: Final result Specimen: Blood from Hand, Right Updated: 11/08/22 2220     hs TnI 2hr 5 ng/L      Delta 2hr hsTnI 0 ng/L     HS Troponin I 4hr [[id number]]     Lab Status: No result Specimen: Blood     CBC and differential [[id number]]  (Abnormal) Collected: 11/08/22 1820    Lab Status: Final result Specimen: Blood from Hand, Right Updated: 11/08/22 1916     WBC 6 60 Thousand/uL      RBC 3 01 Million/uL      Hemoglobin 6 3 g/dL      Hematocrit 22 6 %      MCV 75 fL      MCH 20 9 pg      MCHC 27 9 g/dL      RDW 18 6 %      MPV 10 7 fL      Platelets 048 Thousands/uL      nRBC 0 /100 WBCs      Neutrophils Relative 51 %      Immat GRANS % 0 %      Lymphocytes Relative 30 %      Monocytes Relative 16 %      Eosinophils Relative 3 %      Basophils Relative 0 %      Neutrophils Absolute 3 41 Thousands/µL      Immature Grans Absolute 0 02 Thousand/uL      Lymphocytes Absolute 1 96 Thousands/µL      Monocytes Absolute 1 03 Thousand/µL      Eosinophils Absolute 0 17 Thousand/µL      Basophils Absolute 0 01 Thousands/µL     Narrative: This is an appended report  These results have been appended to a previously verified report      HS Troponin 0hr (reflex protocol) [[id number]]  (Normal) Collected: 11/08/22 1820    Lab Status: Final result Specimen: Blood from Hand, Right Updated: 11/08/22 1857     hs TnI 0hr 5 ng/L     Magnesium [[id number]]  (Abnormal) Collected: 11/08/22 1820    Lab Status: Final result Specimen: Blood from Hand, Right Updated: 11/08/22 1852     Magnesium 1 5 mg/dL     NT-BNP PRO [[id number]]  (Abnormal) Collected: 11/08/22 1820    Lab Status: Final result Specimen: Blood from Hand, Right Updated: 11/08/22 1852     NT-proBNP 2,256 pg/mL     Comprehensive metabolic panel [[id number]]  (Abnormal) Collected: 11/08/22 1820    Lab Status: Final result Specimen: Blood from Hand, Right Updated: 11/08/22 1847     Sodium 134 mmol/L      Potassium 4 4 mmol/L      Chloride 103 mmol/L      CO2 19 mmol/L      ANION GAP 12 mmol/L      BUN 19 mg/dL      Creatinine 1 17 mg/dL      Glucose 126 mg/dL      Calcium 7 9 mg/dL      Corrected Calcium 8 9 mg/dL      AST 32 U/L      ALT 18 U/L      Alkaline Phosphatase 145 U/L      Total Protein 6 7 g/dL      Albumin 2 7 g/dL      Total Bilirubin 0 60 mg/dL      eGFR 56 ml/min/1 73sq m     Narrative:      [facility] guidelines for Chronic Kidney Disease (CKD):   •  Stage 1 with normal or high GFR (GFR > 90 mL/min/1 73 square meters)  •  Stage 2 Mild CKD (GFR = 60-89 mL/min/1 73 square meters)  •  Stage 3A Moderate CKD (GFR = 45-59 mL/min/1 73 square meters)  •  Stage 3B Moderate CKD (GFR = 30-44 mL/min/1 73 square meters)  •  Stage 4 Severe CKD (GFR = 15-29 mL/min/1 73 square meters)  •  Stage 5 End Stage CKD (GFR <15 mL/min/1 73 square meters)  Note: GFR calculation is accurate only with a steady state creatinine    Protime-INR [[id number]]  (Normal) Collected: 11/08/22 1820    Lab Status: Final result Specimen: Blood from Arm, Right Updated: 11/08/22 1840     Protime 12 4 seconds      INR 0 94    APTT [[id number]]  (Normal) Collected: 11/08/22 1820    Lab Status: Final result Specimen: Blood from Arm, Right Updated: 11/08/22 1840     PTT 26 seconds     FLU/RSV/COVID - if FLU/RSV clinically relevant [[id number]]  (Normal) Collected: 11/08/22 1553    Lab Status: Final result Specimen: Nares from Nose Updated: 11/08/22 1637     SARS-CoV-2 Negative     INFLUENZA A PCR Negative     INFLUENZA B PCR Negative     RSV PCR Negative    Narrative:      FOR PEDIATRIC PATIENTS - copy/paste COVID Guidelines URL to browser: [email or web address]    SARS-CoV-2 assay is a Nucleic Acid Amplification assay intended for the  qualitative detection of nucleic acid from SARS-CoV-2 in nasopharyngeal  swabs  Results are for the presumptive identification of SARS-CoV-2 RNA  Positive results are indicative of infection with SARS-CoV-2, the virus  causing COVID-19, but do not rule out bacterial infection or co-infection  with other viruses  Laboratories within the [place] and its  territories are required to report all positive results to the appropriate  public health authorities  Negative results do not preclude SARS-CoV-2  infection and should not be used as the sole basis for treatment or other  patient management decisions  Negative results must be combined with  clinical observations, patient history, and epidemiological information  This test has not been FDA cleared or approved  This test has been authorized by FDA under an Emergency Use Authorization  (EUA)   This test is only authorized for the duration of time the  declaration that circumstances exist justifying the authorization of the  emergency use of an in vitro diagnostic tests for detection of SARS-CoV-2  virus and/or diagnosis of COVID-19 infection under section 564(b)(1) of  the Act, 21 U  S C  162PVP-7(D)(0), unless the authorization is terminated  or revoked sooner  The test has been validated but independent review by FDA  and CLIA is pending  Test performed using [employer] GeneXpert: This RT-PCR assay targets N2,  a region unique to SARS-CoV-2  A conserved region in the E-gene was chosen  for pan-Sarbecovirus detection which includes SARS-CoV-2  According to CMS-2020-01-R, this platform meets the definition of high-throughput technology  CTA ED chest PE Study   Final Result by Adam Nowak MD (11/08 2036)      Pulmonary emboli in the proximal segmental branches of the left lower lobe with subsegmental extension  Significant decrease in size and number of the left sided  nodules likely representing resolving bronchiolitis/pneumonia               I personally discussed this study with Dr Joe Olivas on 11/8/2022 at 8:31 PM                   Workstation performed: [id number]         XR chest 2 views    (Results Pending)              Procedures  ECG 12 Lead Documentation Only    Date/Time: 11/8/2022 11:46 PM  Performed by: Taina Temple PA-C  Authorized by: Taina Temple PA-C     ECG reviewed by me, the ED Provider: yes    Patient location:  ED  Interpretation:     Interpretation: normal    Quality:     Tracing quality:  Limited by artifact  Rate:     ECG rate:  105    ECG rate assessment: tachycardic    Rhythm:     Rhythm: sinus rhythm    Ectopy:     Ectopy: none    QRS:     QRS axis:  Normal    QRS intervals:  Normal  Conduction:     Conduction: normal    ST segments:     ST segments:  Normal  T waves:     T waves: normal               ED Course  ED Course as of 11/08/22 2346   Tue Nov 08, 2022 2108 Dr Kelsey Hollins ([facility] transplant team) [phone]             HEART Risk Score    Flowsheet Row Most Recent Value   Heart Score Risk Calculator History 0 Filed at: 11/08/2022 2346   ECG 1 Filed at: 11/08/2022 2346   Age 0 Filed at: 11/08/2022 2346   Risk Factors 1 Filed at: 11/08/2022 2346   Troponin 0 Filed at: 11/08/2022 2346   HEART Score 2 Filed at: 11/08/2022 2346                                      MDM  Number of Diagnoses or Management Options  Acute anemia: new and requires workup  Pulmonary embolus [facility]): new and requires workup  Diagnosis management comments: DDX including but not limited to: pneumonia, pleural effusion, CHF, PE, PTX, ACS, MI, asthma exacerbation, COPD exacerbation, COVID 19, EVALI, anemia, metabolic abnormality, renal failure  Plan:  PE study to rule out PE given new shortness of breath and recent diagnosis of DVT  Cardiac workup  X-ray chest rule out pneumothorax  Dispo pending  Amount and/or Complexity of Data Reviewed  Clinical lab tests: ordered and reviewed  Tests in the radiology section of CPT®: ordered and reviewed  Independent visualization of images, tracings, or specimens: yes    Risk of Complications, Morbidity, and/or Mortality  Presenting problems: moderate  Management options: low  General comments: 42year-old shortness of breath  She was found to be acutely anemic  Also found to have acute PE  She has guaiac-negative stool  Discussed with the on-call transplant team at [facility] where she follows, Dr Beverly Soriano  They recommended holding anticoagulation given her newfound acute anemia  They recommended transfer to [facility]  Unfortunately there is no bed availability there and may not be available the next 24-48 hours  Will transfuse the patient 2 units of PRBCs  Patient understands and agrees with this plan  She understands that for the time being anticoagulation be held given her acute anemia          Patient Progress  Patient progress: stable      Disposition  Final diagnoses:   Pulmonary embolus ([address] )   Acute anemia     Time reflects when diagnosis was documented in both MDM as applicable and the Disposition within this note     Time User Action Codes Description Comment    11/8/2022  9:28 PM Albret García Add [I26 99] Pulmonary embolus ([address] )     11/8/2022  9:28 PM Albert García Add [D64 9] Acute anemia       ED Disposition     ED Disposition   Transfer to Another Facility-In Network    Condition   --    Date/Time   Tue Nov 8, 2022  9:28 PM    Comment   Dean Cuevas Paul Meyers should be transferred out to [facility]             MD Documentation    Airam Crocker Most Recent Value   Patient Condition The patient has been stabilized such that within reasonable medical probability, no material deterioration of the patient condition or the condition of the unborn child(joseph) is likely to result from the transfer   Reason for Transfer Level of Care needed not available at this facility   Benefits of Transfer Specialized equipment and/or services available at the receiving facility (Include comment)________________________   Risks of Transfer Potential for delay in receiving treatment, Potential deterioration of medical condition, Loss of IV, Increased discomfort during transfer, Possible worsening of condition or death during transfer   Accepting Physician Dr Dex Dias Name, [address] [facility]    (Name & Tel number) [facility]   Sending MD Dr Adeola Zhou PA-C   Provider Certification General risk, such as traffic hazards, adverse weather conditions, rough terrain or turbulence, possible failure of equipment (including vehicle or aircraft), or consequences of actions of persons outside the control of the transport personnel      RN Documentation    Flowsheet Row Most [address] [facility] Name, [facility]    (Name & Tel number) [facility]      Follow-up Information    None         Patient's Medications   Discharge Prescriptions    No medications on file       No discharge procedures on file      PDMP Review       Value Time User    PDMP Reviewed  Yes 10/26/2022 10:16 AM Tammy Cid MD          ED Provider  Electronically Signed by           Albert Chung PA-C  11/08/22 0057

## 2022-11-08 NOTE — ED NOTES
No blood return noted at this time, port flushes without any pain or discomfort and patient states she tastes saline in the back of her throat  Per patient, she wishes to try pain medication to see if port completely in before removing port and attempting to reaccess port to try to get blood return   Patient wishes to not be poked for peripheral iv at this time      Marlyn York RN  11/08/22 4116

## 2022-11-09 ENCOUNTER — APPOINTMENT (OUTPATIENT)
Dept: NON INVASIVE DIAGNOSTICS | Facility: HOSPITAL | Age: 47
End: 2022-11-09

## 2022-11-09 VITALS
OXYGEN SATURATION: 95 % | BODY MASS INDEX: 19.88 KG/M2 | DIASTOLIC BLOOD PRESSURE: 85 MMHG | SYSTOLIC BLOOD PRESSURE: 158 MMHG | HEIGHT: 62 IN | WEIGHT: 108 LBS | RESPIRATION RATE: 17 BRPM | TEMPERATURE: 100.3 F | HEART RATE: 83 BPM

## 2022-11-09 DIAGNOSIS — E10.22 TYPE 1 DIABETES MELLITUS WITH DIABETIC CHRONIC KIDNEY DISEASE, UNSPECIFIED CKD STAGE (HCC): ICD-10-CM

## 2022-11-09 LAB
4HR DELTA HS TROPONIN: -1 NG/L
ABO GROUP BLD BPU: NORMAL
ANION GAP SERPL CALCULATED.3IONS-SCNC: 11 MMOL/L (ref 4–13)
AORTIC ROOT: 2.6 CM
APTT PPP: 39 SECONDS (ref 23–37)
APTT PPP: 91 SECONDS (ref 23–37)
ASCENDING AORTA: 3 CM
ATRIAL RATE: 105 BPM
BPU ID: NORMAL
BUN SERPL-MCNC: 15 MG/DL (ref 5–25)
CALCIUM SERPL-MCNC: 8.2 MG/DL (ref 8.3–10.1)
CARDIAC TROPONIN I PNL SERPL HS: 4 NG/L
CHLORIDE SERPL-SCNC: 105 MMOL/L (ref 96–108)
CO2 SERPL-SCNC: 21 MMOL/L (ref 21–32)
CREAT SERPL-MCNC: 1 MG/DL (ref 0.6–1.3)
CROSSMATCH: NORMAL
E WAVE DECELERATION TIME: 133 MS
ERYTHROCYTE [DISTWIDTH] IN BLOOD BY AUTOMATED COUNT: 20.8 % (ref 11.6–15.1)
ERYTHROCYTE [DISTWIDTH] IN BLOOD BY AUTOMATED COUNT: 20.9 % (ref 11.6–15.1)
ERYTHROCYTE [DISTWIDTH] IN BLOOD BY AUTOMATED COUNT: 21.1 % (ref 11.6–15.1)
FRACTIONAL SHORTENING: 38 % (ref 28–44)
GFR SERPL CREATININE-BSD FRML MDRD: 68 ML/MIN/1.73SQ M
GLUCOSE P FAST SERPL-MCNC: 90 MG/DL (ref 65–99)
GLUCOSE SERPL-MCNC: 111 MG/DL (ref 65–140)
GLUCOSE SERPL-MCNC: 192 MG/DL (ref 65–140)
GLUCOSE SERPL-MCNC: 194 MG/DL (ref 65–140)
GLUCOSE SERPL-MCNC: 66 MG/DL (ref 65–140)
GLUCOSE SERPL-MCNC: 77 MG/DL (ref 65–140)
GLUCOSE SERPL-MCNC: 90 MG/DL (ref 65–140)
HCT VFR BLD AUTO: 31.2 % (ref 34.8–46.1)
HCT VFR BLD AUTO: 32.2 % (ref 34.8–46.1)
HCT VFR BLD AUTO: 34.6 % (ref 34.8–46.1)
HGB BLD-MCNC: 10.4 G/DL (ref 11.5–15.4)
HGB BLD-MCNC: 9.3 G/DL (ref 11.5–15.4)
HGB BLD-MCNC: 9.7 G/DL (ref 11.5–15.4)
INR PPP: 1.2 (ref 0.84–1.19)
INTERVENTRICULAR SEPTUM IN DIASTOLE (PARASTERNAL SHORT AXIS VIEW): 0.7 CM
INTERVENTRICULAR SEPTUM: 0.7 CM (ref 0.6–1.1)
LEFT ATRIUM SIZE: 3.6 CM
LEFT INTERNAL DIMENSION IN SYSTOLE: 2.3 CM (ref 2.1–4)
LEFT VENTRICULAR INTERNAL DIMENSION IN DIASTOLE: 3.7 CM (ref 3.5–6)
LEFT VENTRICULAR POSTERIOR WALL IN END DIASTOLE: 0.7 CM
LEFT VENTRICULAR STROKE VOLUME: 40 ML
LVSV (TEICH): 40 ML
MCH RBC QN AUTO: 24.1 PG (ref 26.8–34.3)
MCH RBC QN AUTO: 24.3 PG (ref 26.8–34.3)
MCH RBC QN AUTO: 24.4 PG (ref 26.8–34.3)
MCHC RBC AUTO-ENTMCNC: 29.8 G/DL (ref 31.4–37.4)
MCHC RBC AUTO-ENTMCNC: 30.1 G/DL (ref 31.4–37.4)
MCHC RBC AUTO-ENTMCNC: 30.1 G/DL (ref 31.4–37.4)
MCV RBC AUTO: 81 FL (ref 82–98)
MV E'TISSUE VEL-SEP: 10 CM/S
MV PEAK A VEL: 0.48 M/S
MV PEAK E VEL: 52 CM/S
MV STENOSIS PRESSURE HALF TIME: 38 MS
MV VALVE AREA P 1/2 METHOD: 5.79 CM2
P AXIS: -18 DEGREES
PLATELET # BLD AUTO: 212 THOUSANDS/UL (ref 149–390)
PLATELET # BLD AUTO: 218 THOUSANDS/UL (ref 149–390)
PLATELET # BLD AUTO: 238 THOUSANDS/UL (ref 149–390)
PMV BLD AUTO: 10.4 FL (ref 8.9–12.7)
PMV BLD AUTO: 10.5 FL (ref 8.9–12.7)
PMV BLD AUTO: 11.6 FL (ref 8.9–12.7)
POTASSIUM SERPL-SCNC: 4.5 MMOL/L (ref 3.5–5.3)
PR INTERVAL: 132 MS
PROTHROMBIN TIME: 14.9 SECONDS (ref 11.6–14.5)
QRS AXIS: 63 DEGREES
QRSD INTERVAL: 64 MS
QT INTERVAL: 306 MS
QTC INTERVAL: 404 MS
RBC # BLD AUTO: 3.86 MILLION/UL (ref 3.81–5.12)
RBC # BLD AUTO: 3.98 MILLION/UL (ref 3.81–5.12)
RBC # BLD AUTO: 4.28 MILLION/UL (ref 3.81–5.12)
SL CV LV EF: 55
SL CV PED ECHO LEFT VENTRICLE DIASTOLIC VOLUME (MOD BIPLANE) 2D: 59 ML
SL CV PED ECHO LEFT VENTRICLE SYSTOLIC VOLUME (MOD BIPLANE) 2D: 19 ML
SODIUM SERPL-SCNC: 137 MMOL/L (ref 135–147)
T WAVE AXIS: 34 DEGREES
TR MAX PG: 32 MMHG
TR PEAK VELOCITY: 2.8 M/S
TRICUSPID VALVE PEAK REGURGITATION VELOCITY: 2.82 M/S
UNIT DISPENSE STATUS: NORMAL
UNIT PRODUCT CODE: NORMAL
UNIT PRODUCT VOLUME: 350 ML
UNIT RH: NORMAL
VENTRICULAR RATE: 105 BPM
WBC # BLD AUTO: 5.9 THOUSAND/UL (ref 4.31–10.16)
WBC # BLD AUTO: 5.96 THOUSAND/UL (ref 4.31–10.16)
WBC # BLD AUTO: 7.19 THOUSAND/UL (ref 4.31–10.16)

## 2022-11-09 RX ORDER — HEPARIN SODIUM 1000 [USP'U]/ML
3600 INJECTION, SOLUTION INTRAVENOUS; SUBCUTANEOUS
Status: DISCONTINUED | OUTPATIENT
Start: 2022-11-09 | End: 2022-11-10 | Stop reason: HOSPADM

## 2022-11-09 RX ORDER — BLOOD-GLUCOSE SENSOR
1 EACH MISCELLANEOUS
Qty: 12 EACH | Refills: 3 | Status: SHIPPED | OUTPATIENT
Start: 2022-11-09

## 2022-11-09 RX ORDER — OXYCODONE HYDROCHLORIDE 5 MG/1
5 TABLET ORAL EVERY 6 HOURS PRN
Status: DISCONTINUED | OUTPATIENT
Start: 2022-11-09 | End: 2022-11-09

## 2022-11-09 RX ORDER — BLOOD-GLUCOSE TRANSMITTER
1 EACH MISCELLANEOUS CONTINUOUS
Qty: 3 EACH | Refills: 0 | Status: SHIPPED | OUTPATIENT
Start: 2022-11-09

## 2022-11-09 RX ORDER — HYDROMORPHONE HCL/PF 1 MG/ML
0.5 SYRINGE (ML) INJECTION EVERY 6 HOURS PRN
Status: DISCONTINUED | OUTPATIENT
Start: 2022-11-09 | End: 2022-11-09

## 2022-11-09 RX ORDER — BLOOD-GLUCOSE,RECEIVER,CONT
1 EACH MISCELLANEOUS CONTINUOUS
Qty: 1 EACH | Refills: 0 | Status: SHIPPED | OUTPATIENT
Start: 2022-11-09

## 2022-11-09 RX ORDER — HEPARIN SODIUM 10000 [USP'U]/100ML
3-30 INJECTION, SOLUTION INTRAVENOUS
Status: DISCONTINUED | OUTPATIENT
Start: 2022-11-09 | End: 2022-11-10 | Stop reason: HOSPADM

## 2022-11-09 RX ORDER — HEPARIN SODIUM 1000 [USP'U]/ML
1800 INJECTION, SOLUTION INTRAVENOUS; SUBCUTANEOUS
Status: DISCONTINUED | OUTPATIENT
Start: 2022-11-09 | End: 2022-11-10 | Stop reason: HOSPADM

## 2022-11-09 RX ORDER — OXYCODONE HYDROCHLORIDE 5 MG/1
5 TABLET ORAL EVERY 4 HOURS PRN
Status: DISCONTINUED | OUTPATIENT
Start: 2022-11-09 | End: 2022-11-10 | Stop reason: HOSPADM

## 2022-11-09 RX ORDER — METHOCARBAMOL 500 MG/1
500 TABLET, FILM COATED ORAL EVERY 6 HOURS PRN
Status: DISCONTINUED | OUTPATIENT
Start: 2022-11-09 | End: 2022-11-10 | Stop reason: HOSPADM

## 2022-11-09 RX ORDER — OXYCODONE HYDROCHLORIDE 10 MG/1
10 TABLET ORAL EVERY 4 HOURS PRN
Status: DISCONTINUED | OUTPATIENT
Start: 2022-11-09 | End: 2022-11-10 | Stop reason: HOSPADM

## 2022-11-09 RX ORDER — HYDROMORPHONE HCL/PF 1 MG/ML
0.5 SYRINGE (ML) INJECTION EVERY 6 HOURS PRN
Status: DISCONTINUED | OUTPATIENT
Start: 2022-11-09 | End: 2022-11-10 | Stop reason: HOSPADM

## 2022-11-09 RX ADMIN — DOCUSATE SODIUM 100 MG: 100 CAPSULE, LIQUID FILLED ORAL at 09:28

## 2022-11-09 RX ADMIN — HYDROMORPHONE HYDROCHLORIDE 0.5 MG: 1 INJECTION, SOLUTION INTRAMUSCULAR; INTRAVENOUS; SUBCUTANEOUS at 09:42

## 2022-11-09 RX ADMIN — INSULIN LISPRO 3 UNITS: 100 INJECTION, SOLUTION INTRAVENOUS; SUBCUTANEOUS at 17:39

## 2022-11-09 RX ADMIN — INSULIN LISPRO 1 UNITS: 100 INJECTION, SOLUTION INTRAVENOUS; SUBCUTANEOUS at 22:14

## 2022-11-09 RX ADMIN — PREGABALIN 100 MG: 100 CAPSULE ORAL at 09:27

## 2022-11-09 RX ADMIN — HYDROMORPHONE HYDROCHLORIDE 0.5 MG: 1 INJECTION, SOLUTION INTRAMUSCULAR; INTRAVENOUS; SUBCUTANEOUS at 22:14

## 2022-11-09 RX ADMIN — OXYCODONE HYDROCHLORIDE 10 MG: 10 TABLET ORAL at 12:46

## 2022-11-09 RX ADMIN — HEPARIN SODIUM 22 UNITS/KG/HR: 10000 INJECTION, SOLUTION INTRAVENOUS at 16:15

## 2022-11-09 RX ADMIN — OXYCODONE HYDROCHLORIDE 10 MG: 10 TABLET ORAL at 00:25

## 2022-11-09 RX ADMIN — ATOVAQUONE 1500 MG: 750 SUSPENSION ORAL at 09:29

## 2022-11-09 RX ADMIN — PREDNISONE 10 MG: 10 TABLET ORAL at 09:28

## 2022-11-09 RX ADMIN — HEPARIN SODIUM 3600 UNITS: 1000 INJECTION INTRAVENOUS; SUBCUTANEOUS at 16:19

## 2022-11-09 RX ADMIN — INSULIN GLARGINE 3 UNITS: 100 INJECTION, SOLUTION SUBCUTANEOUS at 22:14

## 2022-11-09 RX ADMIN — INSULIN GLARGINE 3 UNITS: 100 INJECTION, SOLUTION SUBCUTANEOUS at 00:48

## 2022-11-09 RX ADMIN — HEPARIN SODIUM 18 UNITS/KG/HR: 10000 INJECTION, SOLUTION INTRAVENOUS at 09:08

## 2022-11-09 RX ADMIN — VALACYCLOVIR HYDROCHLORIDE 500 MG: 500 TABLET, FILM COATED ORAL at 09:28

## 2022-11-09 RX ADMIN — METHOCARBAMOL 500 MG: 500 TABLET ORAL at 01:52

## 2022-11-09 RX ADMIN — PANCRELIPASE 48000 UNITS: 24000; 76000; 120000 CAPSULE, DELAYED RELEASE PELLETS ORAL at 09:26

## 2022-11-09 RX ADMIN — INSULIN LISPRO 3 UNITS: 100 INJECTION, SOLUTION INTRAVENOUS; SUBCUTANEOUS at 09:30

## 2022-11-09 RX ADMIN — PREGABALIN 100 MG: 100 CAPSULE ORAL at 18:14

## 2022-11-09 RX ADMIN — OXYCODONE HYDROCHLORIDE 10 MG: 10 TABLET ORAL at 06:43

## 2022-11-09 RX ADMIN — HYDROMORPHONE HYDROCHLORIDE 0.5 MG: 1 INJECTION, SOLUTION INTRAMUSCULAR; INTRAVENOUS; SUBCUTANEOUS at 16:09

## 2022-11-09 RX ADMIN — PANTOPRAZOLE SODIUM 40 MG: 40 TABLET, DELAYED RELEASE ORAL at 06:43

## 2022-11-09 RX ADMIN — INSULIN LISPRO 1 UNITS: 100 INJECTION, SOLUTION INTRAVENOUS; SUBCUTANEOUS at 17:39

## 2022-11-09 RX ADMIN — DOCUSATE SODIUM 100 MG: 100 CAPSULE, LIQUID FILLED ORAL at 18:14

## 2022-11-09 RX ADMIN — PANCRELIPASE 48000 UNITS: 24000; 76000; 120000 CAPSULE, DELAYED RELEASE PELLETS ORAL at 12:46

## 2022-11-09 RX ADMIN — PANCRELIPASE 48000 UNITS: 24000; 76000; 120000 CAPSULE, DELAYED RELEASE PELLETS ORAL at 17:40

## 2022-11-09 RX ADMIN — TACROLIMUS 0.5 MG: 0.5 CAPSULE ORAL at 09:28

## 2022-11-09 RX ADMIN — OXYCODONE HYDROCHLORIDE 10 MG: 10 TABLET ORAL at 19:57

## 2022-11-09 RX ADMIN — TACROLIMUS 0.5 MG: 0.5 CAPSULE ORAL at 18:36

## 2022-11-09 NOTE — ASSESSMENT & PLAN NOTE
· S/p bilateral lung transplant in 2013 followed by left single lung transplant in 2017 followed by right pneumonectomy in 2021  · Follows with transplant team at [facility]  · Awaiting transfer

## 2022-11-09 NOTE — H&P
[address] [place] [address]  H&P- [address] [place] [address] 1975, 42 y o  female MRN: [id number]  Unit/Bed#: ED 27 Encounter: [id number]  Primary Care Provider: Diandra Patterson MD   Date and time admitted to hospital: 11/8/2022  4:54 PM    * Pulmonary embolus [facility])  Assessment & Plan  Patient presenting to the ED for worsening shortness of breath and bilateral upper extremity swelling/pain  Denies any associated fever/chills, chest pain, abdominal pain, nausea/vomiting/diarrhea  Patient has a history of cystic fibrosis with recurrent pulmonary infections  Recently admitted for antibiotics at [facility] for treatment of pneumonia  During treatment, patient states that her Eliquis was on hold secondary to interaction with the antibiotics  Patient states that she only re-started her Eliquis a few days ago  Patient was also recently diagnosed with a right lower extremity DVT  · CTA chest: Pulmonary emboli in the proximal segmental branches of the left lower lobe with subsegmental extension  · ED spoke with patient's team at [facility]  At this time given acute anemia, will hold off of anticoagulation and provide blood transfusion  Transfer has been initiated by ED (EMTALA and medical necessities completed); however, awaiting bed placement at this time  Contact at [facility]: Dr Mino De Los Santos ([phone]) for any questions regarding treatment plans  · Continuous pulse ox  Currently oxygenating well on RA    · Monitor anemia as listed below  · Obtain Echo  · Monitor on telemetry  · Consult to Hematology for additional recommendations    Anemia  Assessment & Plan  · Hemoglobin drop 6 3 on admission (down from 8 2 on 9/13/22)  · Denies any active signs of bleeding  · Occult blood negative in the ED  · Transfuse with 2U PRBC now  · Holding AC medication  · Monitor CBC Q8H  · Awaiting transfer to [facility]    Cystic fibrosis [facility])  Assessment & Plan  · 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  · Awaiting transfer    Pancreatic insufficiency  Assessment & Plan  · Continue pancrelipase    Type 1 diabetes [facility])  Assessment & Plan  Lab Results   Component Value Date    HGBA1C 9 1 (H) 08/25/2022       No results for input(s): POCGLU in the last 72 hours  Blood Sugar Average: Last 72 hrs:     · Continue home regimen  · Lantus 3U HS  · Humalog 3U TID AC  · Start on SSI with accu checks  · Hypoglycemia protocol  · Diabetic diet    VTE Prophylaxis: Pharmacologic VTE Prophylaxis contraindicated due to anemia  / sequential compression device   Code Status: Level 1 code  Discussion with family: Family at bedside    Anticipated Length of Stay:  Patient will be admitted on an Observation basis with an anticipated length of stay of  Less than 2 midnights  Justification for Hospital Stay: PE    Total Time for Visit, including Counseling / Coordination of Care: 60 minutes  Greater than 50% of this total time spent on direct patient counseling and coordination of care  Chief Complaint:   SOB    History of Present Illness:    Isaac Young is a 42 y o  female who has a past medical history significant for cystic fibrosis, type 1 diabetes, pancreatic insufficiency  Patient presenting to the ED for worsening shortness of breath and bilateral upper extremity swelling/pain  Denies any associated fever/chills, chest pain, abdominal pain, nausea/vomiting/diarrhea  Patient has a history of cystic fibrosis with recurrent pulmonary infections  Recently admitted for antibiotics at [facility] for treatment of pneumonia  During treatment, patient states that her Eliquis was on hold secondary to interaction with the antibiotics  Patient states that she only re-started her Eliquis a few days ago  Patient was also recently diagnosed with a right lower extremity DVT    Patient requiring medical admission for treatment of acute pulmonary embolism and anemia  Patient awaiting transferred to [facility] at this time  Will continue medical management until transfer  All patient questions answered to the best my ability  Review of Systems:    Review of Systems   Constitutional: Negative for chills and fever  HENT: Negative for ear pain and sore throat  Eyes: Negative for pain and visual disturbance  Respiratory: Positive for shortness of breath  Negative for cough  Cardiovascular: Negative for chest pain and palpitations  Gastrointestinal: Negative for abdominal pain and vomiting  Genitourinary: Negative for dysuria and hematuria  Musculoskeletal: Positive for arthralgias  Negative for back pain  Skin: Negative for color change and rash  Neurological: Negative for seizures and syncope  All other systems reviewed and are negative  Past Medical and Surgical History:     Past Medical History:   Diagnosis Date   • ABPA (allergic bronchopulmonary aspergillosis) ([facility] ) 7/8/2012   • Acute kidney injury ([place] [address] ) 9/8/2017   • Acute on chronic respiratory failure ([place] [address] ) 3/4/2017    Last Assessment & Plan:  Formatting of this note might be different from the original  Recurrent with clinical worsening with enlarging PTX, s/p multiple chest tubes, but new fevers 6/19 and worsening leukocytosis indicating infection as cause of 6/19 decompensation with profound hypoxia in setting of shunting through right lung and continued pneumothorax s/p 2nd chest tube placement    6/27: Right   • Anaphylaxis 6/6/2022   • Asthma    • Chest pain 9/21/2021   • Chronic pain    • Chronic respiratory failure with hypoxia ([place] [address] ) 9/7/2021   • COVID-19 6/3/2022   • Cystic fibrosis ([place] [address] )    • Diabetes mellitus ([place] [address] )    • GERD (gastroesophageal reflux disease)    • HBP (high blood pressure)    • HCAP (healthcare-associated pneumonia) 3/6/2020   • Influenza B 2/23/2020   • Kidney stone    • Neutropenic fever ([facility] ) 9/26/2020   • Pneumonia due to Pseudomonas species [facility]) 10/28/2010    Last Assessment & Plan:  Formatting of this note might be different from the original  Polymicrobial pna w/ pseudomonas, M  Abscessus, Cadida glabrata/albicans   • PONV (postoperative nausea and vomiting)    • Pseudomonas aeruginosa infection 9/20/2012   • Transplant recipient     Lung- 2013, 2017   • Wellness examination 2/4/2013    Formatting of this note might be different from the original  Discussed with patient and acknowledgement form signed on 2/4/2013 (ALYSHA)       Past Surgical History:   Procedure Laterality Date   • APPENDECTOMY     • AUGMENTATION MAMMAPLASTY Bilateral 2004   • AUGMENTATION MAMMAPLASTY Right 2021    removed    • BREAST SURGERY      Aug     • BRONCHOSCOPY     • CHOLECYSTECTOMY     • COLONOSCOPY     • EGD  07/2019    Yeast Infect  • FL RETROGRADE PYELOGRAM  1/9/2020   • FL RETROGRADE PYELOGRAM  2/4/2020   • GASTROSTOMY TUBE, PLACE     • IR PORT PLACEMENT  3/22/2022   • IR PORT REMOVAL  1/10/2022   • IR TUNNELED CENTRAL LINE PLACEMENT  1/13/2022   • LUNG SURGERY     • LUNG TRANSPLANT      X2   • CT CYSTO/URETERO W/LITHOTRIPSY &INDWELL STENT INSRT Right 2/4/2020    Procedure: CYSTOSCOPY URETEROSCOPY WITH LITHOTRIPSY HOLMIUM LASER, RETROGRADE PYELOGRAM AND INSERTION STENT URETERAL;  Surgeon: Martinez Stallings MD;  Location: [facility];  Service: Urology   • CT CYSTOURETHROSCOPY,URETER CATHETER Right 1/9/2020    Procedure: CYSTOSCOPY RETROGRADE PYELOGRAM WITH INSERTION STENT URETERAL;  Surgeon: Martinez Stallings MD;  Location: [facility];  Service: Urology   • [facility]  1/17/2017       Meds/Allergies:    Prior to Admission medications    Medication Sig Start Date End Date Taking?  Authorizing Provider   acetaminophen (TYLENOL) 325 mg tablet Take 3 tablets (975 mg total) by mouth every 8 (eight) hours 9/26/21   WAYNE Paredes   albuterol (2 5 mg/3 mL) 0 083 % nebulizer solution Take 6 mL (5 mg total) by nebulization 2 (two) times a day 9/26/21   WAYNE Tierney   ALPRAZolam [place]) 1 mg tablet Take 1 tablet (1 mg total) by mouth daily at bedtime as needed for anxiety or sleep 10/25/22   Sally Gonzalez MD   apixaban (Eliquis) 5 mg Take 2 tablets (10 mg total) by mouth 2 (two) times a day for 7 days, THEN 1 tablet (5 mg total) 2 (two) times a day for 23 days   10/14/22 11/13/22  Rob Tran MD   atovaquone (MEPRON) 750 mg/5 mL suspension Take 1,500 mg by mouth daily    Historical Provider, MD   BD Pen Needle Abi U/F 32G X 4 MM MISC 4 times daily 11/8/22   Rohini Trammell MD   Continuous Blood Gluc  (Dexcom G6 ) SUKHI Use 1 Units continuous 10/28/22   Rohini Trammell MD   Continuous Blood Gluc Sensor (Dexcom G6 Sensor) MISC Use 1 Units every 7 days 10/28/22   Rohini Trammell MD   Continuous Blood Gluc Transmit (Dexcom G6 Transmitter) MISC Use 1 Units continuous 10/28/22   Rohini Trammell MD   dexlansoprazole (DEXILANT) 60 MG capsule Take 1 capsule (60 mg total) by mouth in the morning  5/24/22   Caleb Cano MD   dronabinol (MARINOL) 5 MG capsule TAKE 1 CAPSULE (5 MG TOTAL) BY MOUTH 3 (THREE) TIMES A DAY 8/19/21   Talha Burt MD   insulin aspart (NovoLOG FlexPen) 100 UNIT/ML injection pen Inject 3 units three times a day with meals 11/8/22   Rohini Trammell MD   Insulin Glargine Solostar (Semglee) 100 UNIT/ML SOPN Inject 5 units under the skin every bedtime 11/8/22   Rohini Trammell MD   Insulin Pen Needle (Comfort EZ Pen Needles) 33G X 4 MM MISC Use to inject insulin 4 times a day 11/3/22   Rohini Trammell MD   linaCLOtide (Linzess) 72 MCG CAPS Take 72 mcg by mouth daily 10/20/22   Historical Provider, MD   medroxyPROGESTERone (DEPO-PROVERA) 150 mg/mL injection Inject 1 mL (150 mg total) into a muscle every 3 (three) months 11/1/22   Byron Tinoco MD   metoclopramide (REGLAN) 10 mg tablet TAKE 1 TABLET BY MOUTH THREE TIMES A DAY WITH MEALS 10/31/22   Caleb Cano MD naloxone (NARCAN) 4 mg/0 1 mL nasal spray 4 mg into each nostril 6/6/22   Historical Provider, MD   oxyCODONE (ROXICODONE) 10 MG TABS Take 1 tablet (10 mg total) by mouth every 6 (six) hours as needed for severe pain for up to 14 days Max Daily Amount: 40 mg 10/25/22 11/8/22  Marcelino Carcamo MD   pancrelipase, Lip-Prot-Amyl, (CREON) 24,000 units Take 48,000 Units by mouth 3 (three) times a day with meals    Historical Provider, MD   predniSONE 10 mg tablet Take 10 mg by mouth daily 4/27/21   Historical Provider, MD   pregabalin (Lyrica) 100 mg capsule Take 1 capsule (100 mg total) by mouth 2 (two) times a day 10/26/22   Marcelino Carcamo MD   Respiratory Therapy Supplies (Nebulizer) SUKHI Please dispense nebulizer machine 7/16/21   Historical Provider, MD   sodium chloride (OCEAN) 0 65 % nasal spray 2 sprays into each nostril 2 (two) times a day 11/1/22   Marcelino Carcamo MD   tacrolimus (PROGRAF) 1 mg capsule Take 0 5 mg by mouth 2 (two) times a day 7/1/21   Historical Provider, MD   valACYclovir (VALTREX) 500 mg tablet Take 500 mg by mouth daily 6/1/21   Historical Provider, MD   BD Pen Needle Abi U/F 32G X 4 MM MISC  9/8/22 11/8/22  Historical Provider, MD   cyclobenzaprine (FLEXERIL) 5 mg tablet Take 1-2 tabs every 8 hours as needed for muscle pain/spasm  Patient not taking: No sig reported 8/1/22 11/8/22  Marcelino Carcamo MD   famotidine (PEPCID) 40 MG tablet TAKE 1 TABLET BY MOUTH EVERYDAY AT BEDTIME  Patient not taking: No sig reported 6/8/22 11/8/22  Alysha Burton MD   HYDROmorphone (DILAUDID) 4 mg tablet  9/2/22 11/8/22  Historical Provider, MD   insulin aspart (NovoLOG FlexPen) 100 UNIT/ML injection pen Inject 3 units three times a day with meals 11/3/22 11/8/22  Douglas Keith MD   Insulin Glargine Solostar (Semglee) 100 UNIT/ML SOPN Inject 5 units under the skin every bedtime 11/3/22 11/8/22  Douglas Keith MD   sucralfate (CARAFATE) 1 g/10 mL suspension Take 10 mL (1 g total) by mouth 4 (four) times a day (with meals and at bedtime)  Patient not taking: Reported on 10/28/2022 8/1/22 11/8/22  Dustin Cam MD   traZODone (DESYREL) 50 mg tablet Take 0 5 tablets (25 mg total) by mouth daily at bedtime 9/26/21 11/8/22  WAYNE Simms     I have reviewed home medications using allscripts  Allergies:    Allergies   Allergen Reactions   • Bebtelovimab Anaphylaxis   • Vancomycin Angioedema and Hives   • Gabapentin Hallucinations   • Ceftazidime Headache     Severe headaches after desensitization   • Nsaids Other (See Comments)     Lung transplant increases risk of renal toxicity, call lung txp provider to discuss if needed       Social History:     Marital Status: /Civil Union   Occupation: NA  Patient Pre-hospital Living Situation: Home  Patient Pre-hospital Level of Mobility: Walks  Patient Pre-hospital Diet Restrictions: Diabetic  Substance Use History:   Social History     Substance and Sexual Activity   Alcohol Use Not Currently    Comment: social     Social History     Tobacco Use   Smoking Status Former Smoker   • Packs/day: 0 50   • Years: 12 00   • Pack years: 6 00   Smokeless Tobacco Never Used     Social History     Substance and Sexual Activity   Drug Use Yes   • Frequency: 2 0 times per week   • Types: Marijuana    Comment: medical edible marijuana prescribed       Family History:    Family History   Problem Relation Age of Onset   • Alcohol abuse Mother    • Mental illness Mother    • Alcohol abuse Father    • COPD Maternal Grandmother    • Lung cancer Maternal Grandmother 72   • COPD Maternal Grandfather    • COPD Paternal Grandmother    • Lung cancer Paternal Grandmother 72   • No Known Problems Sister    • No Known Problems Paternal Aunt    • Breast cancer Neg Hx    • Ovarian cancer Neg Hx    • Colon cancer Neg Hx        Physical Exam:     Vitals:   Blood Pressure: 103/63 (11/08/22 2100)  Pulse: 82 (11/08/22 2100)  Temperature: 98 9 °F (37 2 °C) (11/08/22 1545)  Temp Source: Temporal (11/08/22 1545)  Respirations: 16 (11/08/22 2100)  SpO2: 97 % (11/08/22 2100)    Physical Exam  Vitals and nursing note reviewed  Constitutional:       General: She is not in acute distress  Appearance: She is well-developed  HENT:      Head: Normocephalic and atraumatic  Mouth/Throat:      Pharynx: Oropharynx is clear  Eyes:      Conjunctiva/sclera: Conjunctivae normal    Cardiovascular:      Rate and Rhythm: Normal rate and regular rhythm  Heart sounds: No murmur heard  Pulmonary:      Effort: Pulmonary effort is normal  No respiratory distress  Breath sounds: Normal breath sounds  Abdominal:      General: Bowel sounds are normal       Palpations: Abdomen is soft  Tenderness: There is no abdominal tenderness  Musculoskeletal:      Cervical back: Neck supple  Right lower leg: No edema  Left lower leg: No edema  Skin:     General: Skin is warm and dry  Neurological:      Mental Status: She is alert and oriented to person, place, and time  Additional Data:     Lab Results: I have personally reviewed pertinent reports  Results from last 7 days   Lab Units 11/08/22 2121 11/08/22  1820   WBC Thousand/uL  --  6 60   HEMOGLOBIN g/dL 6 0* 6 3*   HEMATOCRIT % 22 0* 22 6*   PLATELETS Thousands/uL  --  206   NEUTROS PCT %  --  51   LYMPHS PCT %  --  30   MONOS PCT %  --  16*   EOS PCT %  --  3     Results from last 7 days   Lab Units 11/08/22  1820   SODIUM mmol/L 134*   POTASSIUM mmol/L 4 4   CHLORIDE mmol/L 103   CO2 mmol/L 19*   BUN mg/dL 19   CREATININE mg/dL 1 17   ANION GAP mmol/L 12   CALCIUM mg/dL 7 9*   ALBUMIN g/dL 2 7*   TOTAL BILIRUBIN mg/dL 0 60   ALK PHOS U/L 145*   ALT U/L 18   AST U/L 32   GLUCOSE RANDOM mg/dL 126     Results from last 7 days   Lab Units 11/08/22  1820   INR  0 94                   Imaging: I have personally reviewed pertinent reports        CTA ED chest PE Study   Final Result by Melanie Elliott MD (11/08 2036) Pulmonary emboli in the proximal segmental branches of the left lower lobe with subsegmental extension  Significant decrease in size and number of the left sided  nodules likely representing resolving bronchiolitis/pneumonia  I personally discussed this study with Dr Sandhu Living on 11/8/2022 at 8:31 PM                   Workstation performed: [id number]         XR chest 2 views    (Results Pending)       EKG, Pathology, and Other Studies Reviewed on Admission:   · EKG: Reviewed    Allscripts / Epic Records Reviewed: Yes     ** Please Note: This note has been constructed using a voice recognition system   **

## 2022-11-09 NOTE — ASSESSMENT & PLAN NOTE
Patient presenting to the ED for worsening shortness of breath and bilateral upper extremity swelling/pain  Denies any associated fever/chills, chest pain, abdominal pain, nausea/vomiting/diarrhea  Patient has a history of cystic fibrosis with recurrent pulmonary infections  Recently admitted for antibiotics at [facility] for treatment of pneumonia  During treatment, patient states that her Eliquis was on hold secondary to interaction with the antibiotics  Patient states that she only re-started her Eliquis a few days ago  Patient was also recently diagnosed with a right lower extremity DVT  · CTA chest: Pulmonary emboli in the proximal segmental branches of the left lower lobe with subsegmental extension  · ED spoke with patient's team at [facility]  At this time given acute anemia, will hold off of anticoagulation and provide blood transfusion  Transfer has been initiated by ED (EMTALA and medical necessities completed); however, awaiting bed placement at this time  Contact at [facility]: Dr Kevin Edwards ([phone]) for any questions regarding treatment plans  · Continuous pulse ox  Currently oxygenating well on RA    · Monitor anemia as listed below  · Obtain Echo  · Monitor on telemetry  · Consult to Hematology for additional recommendations

## 2022-11-09 NOTE — EMTALA/ACUTE CARE TRANSFER
[address] [facility] [address]  [address]  [facility] [address]  Dept: [phone]      EMTALA TRANSFER CONSENT    NAME Valerie Fortune                                         1975                              MRN [id number]    I have been informed of my rights regarding examination, treatment, and transfer   by Dr Kofi Chavez MD    Benefits: Specialized equipment and/or services available at the receiving facility (Include comment)________________________    Risks: Potential for delay in receiving treatment, Potential deterioration of medical condition, Loss of IV, Increased discomfort during transfer, Possible worsening of condition or death during transfer      Consent for Transfer:  I acknowledge that my medical condition has been evaluated and explained to me by the emergency department physician or other qualified medical person and/or my attending physician, who has recommended that I be transferred to the service of  Accepting Physician: Dr Dayo Merchant at [address] Name, [address] : [facility]  The above potential benefits of such transfer, the potential risks associated with such transfer, and the probable risks of not being transferred have been explained to me, and I fully understand them  The doctor has explained that, in my case, the benefits of transfer outweigh the risks  I agree to be transferred  I authorize the performance of emergency medical procedures and treatments upon me in both transit and upon arrival at the receiving facility  Additionally, I authorize the release of any and all medical records to the receiving facility and request they be transported with me, if possible  I understand that the safest mode of transportation during a medical emergency is an ambulance and that the Hospital advocates the use of this mode of transport   Risks of traveling to the receiving facility by car, including absence of medical control, life sustaining equipment, such as oxygen, and medical personnel has been explained to me and I fully understand them  (JUSTIN CORRECT BOX BELOW)  [ X ]  I consent to the stated transfer and to be transported by ambulance/helicopter  [  ]  I consent to the stated transfer, but refuse transportation by ambulance and accept full responsibility for my transportation by car  I understand the risks of non-ambulance transfers and I exonerate the Hospital and its staff from any deterioration in my condition that results from this refusal     X___________________________________________    DATE  22  TIME________  Signature of patient or legally responsible individual signing on patient behalf           RELATIONSHIP TO PATIENT_________________________          Provider Certification    NAME Ranulfo Avila                                        [facility] 1975                              MRN [id number]    A medical screening exam was performed on the above named patient  Based on the examination:    Condition Necessitating Transfer The primary encounter diagnosis was Pulmonary embolus ([address] )  A diagnosis of Acute anemia was also pertinent to this visit      Patient Condition: The patient has been stabilized such that within reasonable medical probability, no material deterioration of the patient condition or the condition of the unborn child(joseph) is likely to result from the transfer    Reason for Transfer: Level of Care needed not available at this facility    Transfer Requirements: [address]   · Space available and qualified personnel available for treatment as acknowledged by [facility]  · Agreed to accept transfer and to provide appropriate medical treatment as acknowledged by       Dr Marysue Ahumada  · Appropriate medical records of the examination and treatment of the patient are provided at the time of transfer   [address] _______  · Transfer will be performed by qualified personnel from    and appropriate transfer equipment as required, including the use of necessary and appropriate life support measures  Provider Certification: I have examined the patient and explained the following risks and benefits of being transferred/refusing transfer to the patient/family:  General risk, such as traffic hazards, adverse weather conditions, rough terrain or turbulence, possible failure of equipment (including vehicle or aircraft), or consequences of actions of persons outside the control of the transport personnel      Based on these reasonable risks and benefits to the patient and/or the unborn child(joseph), and based upon the information available at the time of the patient’s examination, I certify that the medical benefits reasonably to be expected from the provision of appropriate medical treatments at another medical facility outweigh the increasing risks, if any, to the individual’s medical condition, and in the case of labor to the unborn child, from effecting the transfer      X____________________________________________ DATE 11/08/22        TIME_______      ORIGINAL - SEND TO MEDICAL RECORDS   COPY - SEND WITH PATIENT DURING TRANSFER

## 2022-11-09 NOTE — UTILIZATION REVIEW
Initial Clinical Review    Admission: Date/Time/Statement:   Admission Orders (From admission, onward)     Ordered        11/08/22 2246  Place in Observation  Once                      Orders Placed This Encounter   Procedures   • Place in Observation     Standing Status:   Standing     Number of Occurrences:   1     Order Specific Question:   Level of Care     Answer:   Med Surg [16]     ED Arrival Information     Expected   -    Arrival   11/8/2022 15:39    Acuity   Emergent            Means of arrival   Walk-In    Escorted by   [facility]    Service   Hospitalist    Admission type   Emergency            Arrival complaint   arm swelling(Lung Transplant PT)           Chief Complaint   Patient presents with   • Arm Swelling     Patient to ED due to bilateral arm swelling  Pt has hx blood clots due to ecmo  Pt recently discharged after blood clot in R leg  Pt was told by PCP to come to ED due hx of blood clots  R Pneumomectomy in October 2021  Taking Eliquis BID  Shortness of Breath on exertion  Denies chest pain and n/v/d  No acute distress noted  Initial Presentation: 42 y o  female to the ED from home with complaints of bilateral arm swelling for 3 weeks  H/O cystic fibrosis, bilateral lung transplant  And pneumonectomy about a year prior  Admitted in august 2022 for MSSA and pneumonia and then again in sept 2022 for fungal pneumonia  Admitted under observation for Pulmonary embolus, anemia  Arrives tachycardic  PRo bnp elevated  CT chest shows PE  hgb 6 3  As per transplant team at another facility, recommended holding anticoag  Plan for transfer to transplant facility  NO beds available as of now  Give 2 units pRBCs  Check ECHo, monitor tele  Hematology consult  Trend hgb  OCcult blood neg  Monitor CBC every 8 hours     Date:     Day 2:      ED Triage Vitals   Temperature Pulse Respirations Blood Pressure SpO2   11/08/22 1545 11/08/22 1545 11/08/22 1545 11/08/22 1545 11/08/22 1545   98 9 °F (37 2 °C) (!) 106 18 130/65 100 %      Temp Source Heart Rate Source Patient Position - Orthostatic VS BP Location FiO2 (%)   11/08/22 1545 11/08/22 1545 11/08/22 1545 11/08/22 1545 --   Temporal Monitor Sitting Left arm       Pain Score       11/08/22 1705       7          Wt Readings from Last 1 Encounters:   11/09/22 49 kg (108 lb)     Additional Vital Signs:   Time Temp Pulse Resp BP MAP (mmHg) SpO2 O2 Device Patient Position - Orthostatic VS   11/09/22 1115 -- 86 15 115/70 87 94 % -- --   11/09/22 1100 -- 84 16 111/65 82 94 % -- --   11/09/22 1049 -- 85 -- 115/69 -- 95 % None (Room air) Lying   11/09/22 0845 -- 89 -- 114/74 -- 94 % None (Room air) Lying   11/09/22 0750 -- 90 -- 114/74 -- -- -- --   11/09/22 0730 -- 83 17 127/76 97 94 % -- --   11/09/22 0700 -- 79 21 133/79 99 98 % None (Room air) --   11/09/22 0630 -- 81 24 Abnormal  117/72 89 95 % -- --   11/09/22 0610 99 °F (37 2 °C) 82 17 126/75 -- 94 % None (Room air) Sitting   11/09/22 0600 99 °F (37 2 °C) 74 16 119/72 91 95 % -- --   11/09/22 0500 98 5 °F (36 9 °C) 78 19 114/67 85 96 % -- --   11/09/22 0430 98 2 °F (36 8 °C) 77 14 118/68 -- 98 % -- --   11/09/22 0415 98 1 °F (36 7 °C) 83 18 128/70 -- 100 % -- --   11/09/22 0409 98 1 °F (36 7 °C) 78 16 127/82 -- -- -- --   11/09/22 0350 -- 77 15 112/72 87 98 % -- --   11/09/22 0245 98 °F (36 7 °C) 79 14 114/71 88 97 % -- --   11/09/22 0115 98 1 °F (36 7 °C) 80 15 118/69 89 97 % None (Room air) Sitting   11/09/22 0045 97 8 °F (36 6 °C) 82 16 125/73 93 100 % -- --   11/09/22 0019 98 7 °F (37 1 °C) 82 16 138/75 -- 100 % None (Room air) --   11/08/22 2100 -- 82 16 103/63 78 97 % None (Room air) Lying   11/08/22 2000 -- 86 16 110/63 81 100 % None (Room air) Lying   11/08/22 1956 -- 91 17 113/62 81 100 % None (Room air) Lying   11/08/22 1715 -- 97 18 130/71 94 100 % None (Room air) Lying   11/08/22 1550 -- -- -- -- -- 99 % -- --   11/08/22 1545 98 9 °F (37 2 °C) 106 Abnormal  18 130/65 -- 100 % None (Room air) Sitting Pertinent Labs/Diagnostic Test Results:   11/8 EKG: Interpretation:     Interpretation: normal     Quality:     Tracing quality:  Limited by artifact   Rate:     ECG rate:  105     ECG rate assessment: tachycardic     Rhythm:     Rhythm: sinus rhythm     Ectopy:     Ectopy: none     QRS:     QRS axis:  Normal     QRS intervals:  Normal   Conduction:     Conduction: normal     ST segments:     ST segments:  Normal   T waves:     T waves: normal    CTA ED chest PE Study   Final Result by Sabra Amador MD (11/08 2036)      Pulmonary emboli in the proximal segmental branches of the left lower lobe with subsegmental extension  Significant decrease in size and number of the left sided  nodules likely representing resolving bronchiolitis/pneumonia  I personally discussed this study with Dr Karly Waterman on 11/8/2022 at 8:31 PM                   Workstation performed: [id number]         XR chest 2 views   Final Result by Hanna Melara MD (11/09 4115)      No acute cardiopulmonary disease  Status post right pneumonectomy                    Workstation performed: [id number]           Results from last 7 days   Lab Units 11/08/22  1553   SARS-COV-2  Negative     Results from last 7 days   Lab Units 11/09/22  0650 11/08/22  2121 11/08/22  1820   WBC Thousand/uL 5 90  --  6 60   HEMOGLOBIN g/dL 9 7* 6 0* 6 3*   HEMATOCRIT % 32 2* 22 0* 22 6*   PLATELETS Thousands/uL 218  --  206   NEUTROS ABS Thousands/µL  --   --  3 41         Results from last 7 days   Lab Units 11/09/22  0650 11/08/22  1820   SODIUM mmol/L 137 134*   POTASSIUM mmol/L 4 5 4 4   CHLORIDE mmol/L 105 103   CO2 mmol/L 21 19*   ANION GAP mmol/L 11 12   BUN mg/dL 15 19   CREATININE mg/dL 1 00 1 17   EGFR ml/min/1 73sq m 68 56   CALCIUM mg/dL 8 2* 7 9*   MAGNESIUM mg/dL  --  1 5*     Results from last 7 days   Lab Units 11/08/22  1820   AST U/L 32   ALT U/L 18   ALK PHOS U/L 145*   TOTAL PROTEIN g/dL 6 7   ALBUMIN g/dL 2 7*   TOTAL BILIRUBIN mg/dL 0 60     Results from last 7 days   Lab Units 11/09/22  1144 11/09/22  0705 11/09/22  0038   POC GLUCOSE mg/dl 66 77 111     Results from last 7 days   Lab Units 11/09/22  0650 11/08/22  1820   GLUCOSE RANDOM mg/dL 90 126         Results from last 7 days   Lab Units 11/08/22  2111 11/08/22  1820   HS TNI 0HR ng/L  --  5   HS TNI 2HR ng/L 5  --    HSTNI D2 ng/L 0  --          Results from last 7 days   Lab Units 11/08/22  1820   PROTIME seconds 12 4   INR  0 94   PTT seconds 26         Results from last 7 days   Lab Units 11/08/22  1820   NT-PRO BNP pg/mL 2,256*       Results from last 7 days   Lab Units 11/08/22  1553   INFLUENZA A PCR  Negative   INFLUENZA B PCR  Negative   RSV PCR  Negative     ED Treatment:   Medication Administration from 11/08/2022 1539 to 11/09/2022 1332       Date/Time Order Dose Route Action     11/08/2022 1802 fentanyl citrate (PF) 100 MCG/2ML 50 mcg 50 mcg Intravenous Given     11/08/2022 1925 magnesium sulfate 2 g/50 mL IVPB (premix) 2 g 2 g Intravenous New Bag     11/08/2022 1946 morphine injection 4 mg 4 mg Intravenous Given     11/08/2022 2050 HYDROmorphone (DILAUDID) injection 0 5 mg 0 5 mg Intravenous Given     11/09/2022 0929 atovaquone (MEPRON) oral suspension 1,500 mg 1,500 mg Oral Given     11/09/2022 0643 pantoprazole (PROTONIX) EC tablet 40 mg 40 mg Oral Given     11/09/2022 0930 insulin lispro (HumaLOG) 100 units/mL subcutaneous injection 3 Units 3 Units Subcutaneous Given     11/09/2022 0048 insulin glargine (LANTUS) subcutaneous injection 3 Units 0 03 mL 3 Units Subcutaneous Given     11/09/2022 1246 oxyCODONE (ROXICODONE) immediate release tablet 10 mg 10 mg Oral Given     11/09/2022 0643 oxyCODONE (ROXICODONE) immediate release tablet 10 mg 10 mg Oral Given     11/09/2022 0025 oxyCODONE (ROXICODONE) immediate release tablet 10 mg 10 mg Oral Given     11/09/2022 1246 pancrelipase (Lip-Prot-Amyl) (CREON) delayed release capsule 48,000 Units 48,000 Units Oral Given 11/09/2022 0926 pancrelipase (Lip-Prot-Amyl) (CREON) delayed release capsule 48,000 Units 48,000 Units Oral Given     11/09/2022 0928 predniSONE tablet 10 mg 10 mg Oral Given     11/09/2022 0927 pregabalin (LYRICA) capsule 100 mg 100 mg Oral Given     11/09/2022 0928 tacrolimus (PROGRAF) capsule 0 5 mg 0 5 mg Oral Given     11/09/2022 0928 valACYclovir (VALTREX) tablet 500 mg 500 mg Oral Given     11/09/2022 0928 docusate sodium (COLACE) capsule 100 mg 100 mg Oral Given     11/09/2022 0152 methocarbamol (ROBAXIN) tablet 500 mg 500 mg Oral Given     11/09/2022 0908 heparin (porcine) 25,000 units in 0 45% NaCl 250 mL infusion (premix) 18 Units/kg/hr Intravenous New Bag     11/09/2022 0942 HYDROmorphone (DILAUDID) injection 0 5 mg 0 5 mg Intravenous Given        Past Medical History:   Diagnosis Date   • ABPA (allergic [facility]opulmonary aspergillosis) [facility] ) 7/8/2012   • Acute kidney injury ([facility] ) 9/8/2017   • Acute on chronic respiratory failure ([facility] ) 3/4/2017    Last Assessment & Plan:  Formatting of this note might be different from the original  Recurrent with clinical worsening with enlarging PTX, s/p multiple chest tubes, but new fevers 6/19 and worsening leukocytosis indicating infection as cause of 6/19 decompensation with profound hypoxia in setting of shunting through right lung and continued pneumothorax s/p 2nd chest tube placement    6/27: Right   • Anaphylaxis 6/6/2022   • Asthma    • Chest pain 9/21/2021   • Chronic pain    • Chronic respiratory failure with hypoxia ([facility] ) 9/7/2021   • COVID-19 6/3/2022   • Cystic fibrosis (HCC)    • Diabetes mellitus ([facility] )    • GERD (gastroesophageal reflux disease)    • HBP (high blood pressure)    • HCAP (healthcare-associated pneumonia) 3/6/2020   • Influenza B 2/23/2020   • Kidney stone    • Neutropenic fever ([facility] ) 9/26/2020   • Pneumonia due to Pseudomonas species ([facility] ) 10/28/2010    Last Assessment & Plan:  Formatting of this note might be different from the original  Polymicrobial pna w/ pseudomonas, M  Abscessus, Cadida glabrata/albicans   • PONV (postoperative nausea and vomiting)    • Pseudomonas aeruginosa infection 9/20/2012   • Transplant recipient     Lung- 2013, 2017   • Wellness examination 2/4/2013    Formatting of this note might be different from the original  Discussed with patient and acknowledgement form signed on 2/4/2013 (ALYSHA)       Admitting Diagnosis: Arm swelling [M79 89]  Age/Sex: 42 y o  female  Admission Orders:  Scheduled Medications:  atovaquone, 1,500 mg, Oral, Daily  docusate sodium, 100 mg, Oral, BID  dronabinol, 5 mg, Oral, TID  insulin glargine, 3 Units, Subcutaneous, HS  insulin lispro, 1-5 Units, Subcutaneous, 4x Daily (AC & HS)  insulin lispro, 3 Units, Subcutaneous, TID With Meals  metoclopramide, 10 mg, Oral, TID With Meals  pancrelipase (Lip-Prot-Amyl), 48,000 Units, Oral, TID With Meals  pantoprazole, 40 mg, Oral, Early Morning  predniSONE, 10 mg, Oral, Daily  pregabalin, 100 mg, Oral, BID  tacrolimus, 0 5 mg, Oral, BID  valACYclovir, 500 mg, Oral, Daily      Continuous IV Infusions:  heparin (porcine), 3-30 Units/kg/hr (Order-Specific), Intravenous, Titrated      PRN Meds:  acetaminophen, 650 mg, Oral, Q6H PRN  albuterol, 5 mg, Nebulization, BID PRN  ALPRAZolam, 1 mg, Oral, HS PRN  heparin (porcine), 1,800 Units, Intravenous, Q1H PRN  heparin (porcine), 3,600 Units, Intravenous, Q1H PRN  HYDROmorphone, 0 5 mg, Intravenous, Q6H PRN  methocarbamol, 500 mg, Oral, Q6H PRN  oxyCODONE, 10 mg, Oral, Q6H PRN x3 11/9  oxyCODONE, 5 mg, Oral, Q6H PRN          Network Utilization Review Department  ATTENTION: Please call with any questions or concerns to [phone] and carefully listen to the prompts so that you are directed to the right person   All voicemails are confidential   Grace Aiken all requests for admission clinical reviews, approved or denied determinations and any other requests to dedicated fax number below belonging to the campus where the patient is receiving treatment   List of dedicated fax numbers for the Facilities:  [address] [place] [address] DENIALS (Administrative/Medical Necessity) [phone]   [address] (Maternity/NICU/Pediatrics) [phone]   [address] [phone]   [address] [phone]   [address] [address] [address] [place] [address] [phone]   [address] [facility] [address] [address] [facility] [phone]

## 2022-11-09 NOTE — ASSESSMENT & PLAN NOTE
· 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  · Awaiting transfer

## 2022-11-09 NOTE — ASSESSMENT & PLAN NOTE
· Hemoglobin drop 6 3 on admission (down from 8 2 on 9/13/22)  · Denies any active signs of bleeding  · Occult blood negative in the ED  · Transfuse with 2U PRBC now  · Holding AC medication  · Monitor CBC Q8H  · Awaiting transfer to [facility]

## 2022-11-09 NOTE — ED NOTES
Awake alert with continued chest pain described as sharp 7 out of 10  In no resp distress  Continues to wait on a bed  Heparin drip at 18u/kg/hr   8 1ml/hr     Carolina Fish RN  11/09/22 1724

## 2022-11-09 NOTE — ASSESSMENT & PLAN NOTE
Lab Results   Component Value Date    HGBA1C 9 1 (H) 08/25/2022       No results for input(s): POCGLU in the last 72 hours      Blood Sugar Average: Last 72 hrs:     · Continue home regimen  · Lantus 3U HS  · Humalog 3U TID AC  · Start on SSI with accu checks  · Hypoglycemia protocol  · Diabetic diet

## 2022-11-09 NOTE — ASSESSMENT & PLAN NOTE
Lab Results   Component Value Date    HGBA1C 9 1 (H) 08/25/2022       Recent Labs     11/09/22  0038 11/09/22  0705 11/09/22  1144   POCGLU 111 77 66       Blood Sugar Average: Last 72 hrs:  (P) 11 [id number]   · Continue home regimen  · Lantus 3U HS  · Humalog 3U TID AC  · Start on SSI with accu checks  · Hypoglycemia protocol  · Diabetic diet

## 2022-11-09 NOTE — ASSESSMENT & PLAN NOTE
· Hemoglobin drop 6 3 on admission (down from 8 2 on 9/13/22)  · Denies any active signs of bleeding  · Occult blood negative in the ED  · s/p 2U PRBC with appropriate response   · Monitor CBC Q6H  · Awaiting transfer to [facility]

## 2022-11-09 NOTE — ASSESSMENT & PLAN NOTE
· Presented with worsening shortness of Breath  · CTA chest showed acute PE  · ED spoke with patient's team at [facility]  Transfer has been initiated by ED (EMTALA and medical necessities completed); however, awaiting bed placement at this time  Contact at [facility]: Dr Kirsten Deleon ([phone]) for any questions regarding treatment plans  Plan:  · Continuous pulse ox  Currently oxygenating well on RA    · Monitor anemia as listed below  · Follow-up Echo  · Monitor on telemetry  · Start heparin infusion

## 2022-11-09 NOTE — PROGRESS NOTES
[address] [facility]  Progress Note Supriya Abad 1975, 42 y o  female MRN: [id number]  Unit/Bed#: ED 27 Encounter: [id number]  Primary Care Provider: Shubham Breaux MD   Date and time admitted to hospital: 11/8/2022  4:54 PM    * Pulmonary embolus [facility])  Assessment & Plan  · Presented with worsening shortness of Breath  · CTA chest showed acute PE  · ED spoke with patient's team at [facility]  Transfer has been initiated by ED (EMTALA and medical necessities completed); however, awaiting bed placement at this time  Contact at [facility]: Dr Tucker Fajardo ([phone]) for any questions regarding treatment plans  Plan:  · Continuous pulse ox  Currently oxygenating well on RA    · Monitor anemia as listed below  · Follow-up Echo  · Monitor on telemetry  · Start heparin infusion    Anemia  Assessment & Plan  · Hemoglobin drop 6 3 on admission (down from 8 2 on 9/13/22)  · Denies any active signs of bleeding  · Occult blood negative in the ED  · s/p 2U PRBC with appropriate response   · Monitor CBC Q6H  · Awaiting transfer to [facility]    Cystic fibrosis [facility])  Assessment & Plan  · S/p bilateral lung transplant in 2013 followed by left single lung transplant in 2017 followed by right pneumonectomy in 2021  · Follows with transplant team at [facility]  · Awaiting transfer    Pancreatic insufficiency  Assessment & Plan  · Continue pancrelipase    Type 1 diabetes [facility])  Assessment & Plan  Lab Results   Component Value Date    HGBA1C 9 1 (H) 08/25/2022       Recent Labs     11/09/22  0038 11/09/22  0705 11/09/22  1144   POCGLU 111 77 66       Blood Sugar Average: Last 72 hrs:  (P) 16 [id number]   · Continue home regimen  · Lantus 3U HS  · Humalog 3U TID AC  · Start on SSI with accu checks  · Hypoglycemia protocol  · Diabetic diet    VTE Pharmacologic Prophylaxis:   Pharmacologic: Heparin Drip  Mechanical VTE Prophylaxis in Place: Yes    Review of Systems:    Review of Systems   Constitutional: Negative for chills and fever  HENT: Negative for ear pain and sore throat  Eyes: Negative for pain and visual disturbance  Respiratory: Negative for cough and shortness of breath  Cardiovascular: Positive for chest pain  Negative for palpitations  Gastrointestinal: Negative for abdominal pain and vomiting  Genitourinary: Negative for dysuria and hematuria  Musculoskeletal: Negative for arthralgias and back pain  Skin: Negative for color change and rash  Neurological: Negative for seizures and syncope  All other systems reviewed and are negative  Past Medical and Surgical History:     Past Medical History:   Diagnosis Date   • ABPA (allergic bronchopulmonary aspergillosis) ([place] [address] ) 7/8/2012   • Acute kidney injury ([place] [address] ) 9/8/2017   • Acute on chronic respiratory failure ([place] [address] ) 3/4/2017    Last Assessment & Plan:  Formatting of this note might be different from the original  Recurrent with clinical worsening with enlarging PTX, s/p multiple chest tubes, but new fevers 6/19 and worsening leukocytosis indicating infection as cause of 6/19 decompensation with profound hypoxia in setting of shunting through right lung and continued pneumothorax s/p 2nd chest tube placement  6/27: Right   • Anaphylaxis 6/6/2022   • Asthma    • Chest pain 9/21/2021   • Chronic pain    • Chronic respiratory failure with hypoxia ([place] [address] ) 9/7/2021   • COVID-19 6/3/2022   • Cystic fibrosis ([place] [address] )    • Diabetes mellitus ([place] [address] )    • GERD (gastroesophageal reflux disease)    • HBP (high blood pressure)    • HCAP (healthcare-associated pneumonia) 3/6/2020   • Influenza B 2/23/2020   • Kidney stone    • Neutropenic fever ([place] [address] ) 9/26/2020   • Pneumonia due to Pseudomonas species ([place] [address] ) 10/28/2010    Last Assessment & Plan:  Formatting of this note might be different from the original  Polymicrobial pna w/ pseudomonas, M   Abscessus, Cadida glabrata/albicans   • PONV (postoperative nausea and vomiting)    • Pseudomonas aeruginosa infection 9/20/2012   • Transplant recipient     Lung- 2013, 2017   • Wellness examination 2/4/2013    Formatting of this note might be different from the original  Discussed with patient and acknowledgement form signed on 2/4/2013 (ALYSHA)       Past Surgical History:   Procedure Laterality Date   • APPENDECTOMY     • AUGMENTATION MAMMAPLASTY Bilateral 2004   • AUGMENTATION MAMMAPLASTY Right 2021    removed    • BREAST SURGERY      Aug     • BRONCHOSCOPY     • CHOLECYSTECTOMY     • COLONOSCOPY     • EGD  07/2019    Yeast Infect  • FL RETROGRADE PYELOGRAM  1/9/2020   • FL RETROGRADE PYELOGRAM  2/4/2020   • GASTROSTOMY TUBE, PLACE     • IR PORT PLACEMENT  3/22/2022   • IR PORT REMOVAL  1/10/2022   • IR TUNNELED CENTRAL LINE PLACEMENT  1/13/2022   • LUNG SURGERY     • LUNG TRANSPLANT      X2   • VT CYSTO/URETERO W/LITHOTRIPSY &INDWELL STENT INSRT Right 2/4/2020    Procedure: CYSTOSCOPY URETEROSCOPY WITH LITHOTRIPSY HOLMIUM LASER, RETROGRADE PYELOGRAM AND INSERTION STENT URETERAL;  Surgeon: Eva Fish MD;  Location: [facility];  Service: Urology   • VT CYSTOURETHROSCOPY,URETER CATHETER Right 1/9/2020    Procedure: CYSTOSCOPY RETROGRADE PYELOGRAM WITH INSERTION STENT URETERAL;  Surgeon: Eva Fish MD;  Location: [facility];  Service: Urology   • [facility]  1/17/2017       Patient Centered Rounds: I have performed bedside rounds with nursing staff today  Discussions with Specialists or Other Care Team Provider:  Case management, nursing    Education and Discussions with Family / Patient:  Patient    Time Spent for Care: 45 minutes  More than 50% of total time spent on counseling and coordination of care as described above      Current Length of Stay: 0 day(s)    Current Patient Status: Observation   Certification Statement: The patient will continue to require additional inpatient hospital stay due to Acute PE on IV heparin infusion, awaiting transfer to [facility]    Discharge Plan: Awaiting transfer to [facility] in the next 24 hours    Code Status: Level 1 - Full Code      Subjective:   Ongoing mild chest pain    Objective:     Vitals:   Temp (24hrs), Av 4 °F (36 9 °C), Min:97 8 °F (36 6 °C), Max:99 °F (37 2 °C)    Temp:  [97 8 °F (36 6 °C)-99 °F (37 2 °C)] 99 °F (37 2 °C)  HR:  [] 86  Resp:  [14-24] 15  BP: (103-138)/(62-82) 129/82  SpO2:  [94 %-100 %] 95 %  Body mass index is 19 75 kg/m²  Input and Output Summary (last 24 hours): Intake/Output Summary (Last 24 hours) at 2022 1513  Last data filed at 2022 0600  Gross per 24 hour   Intake 750 ml   Output --   Net 750 ml       Physical Exam:     Physical Exam  Vitals and nursing note reviewed  Constitutional:       General: She is not in acute distress  Appearance: She is well-developed  She is not ill-appearing or diaphoretic  HENT:      Head: Normocephalic and atraumatic  Mouth/Throat:      Mouth: Mucous membranes are moist       Pharynx: Oropharynx is clear  Eyes:      General: No scleral icterus  Conjunctiva/sclera: Conjunctivae normal    Cardiovascular:      Rate and Rhythm: Normal rate and regular rhythm  Heart sounds: No murmur heard  Pulmonary:      Effort: Pulmonary effort is normal  No respiratory distress  Breath sounds: Normal breath sounds  No wheezing or rales  Abdominal:      General: Bowel sounds are normal       Palpations: Abdomen is soft  Tenderness: There is no abdominal tenderness  Musculoskeletal:      Cervical back: Neck supple  Right lower leg: No edema  Left lower leg: No edema  Skin:     General: Skin is warm and dry  Neurological:      General: No focal deficit present  Mental Status: She is alert and oriented to person, place, and time     Psychiatric:         Mood and Affect: Mood normal          Behavior: Behavior normal            Additional Data:     Labs:    Results from last 7 days   Lab Units 22  0650 22  9438 11/08/22  1820   WBC Thousand/uL 5 90  --  6 60   HEMOGLOBIN g/dL 9 7*   < > 6 3*   HEMATOCRIT % 32 2*   < > 22 6*   PLATELETS Thousands/uL 218  --  206   NEUTROS PCT %  --   --  51   LYMPHS PCT %  --   --  30   MONOS PCT %  --   --  16*   EOS PCT %  --   --  3    < > = values in this interval not displayed  Results from last 7 days   Lab Units 11/09/22  0650 11/08/22  1820   SODIUM mmol/L 137 134*   POTASSIUM mmol/L 4 5 4 4   CHLORIDE mmol/L 105 103   CO2 mmol/L 21 19*   BUN mg/dL 15 19   CREATININE mg/dL 1 00 1 17   ANION GAP mmol/L 11 12   CALCIUM mg/dL 8 2* 7 9*   ALBUMIN g/dL  --  2 7*   TOTAL BILIRUBIN mg/dL  --  0 60   ALK PHOS U/L  --  145*   ALT U/L  --  18   AST U/L  --  32   GLUCOSE RANDOM mg/dL 90 126     Results from last 7 days   Lab Units 11/08/22  1820   INR  0 94     Results from last 7 days   Lab Units 11/09/22  1144 11/09/22  0705 11/09/22  0038   POC GLUCOSE mg/dl 66 77 111                   * I Have Reviewed All Lab Data Listed Above  * Additional Pertinent Lab Tests Reviewed:  Whit Campbell Admission Reviewed    Imaging:    Imaging Reports Reviewed Today Include:  CT chest  Imaging Personally Reviewed by Myself Includes:  None    Recent Cultures (last 7 days):           Last 24 Hours Medication List:   Current Facility-Administered Medications   Medication Dose Route Frequency Provider Last Rate   • acetaminophen  650 mg Oral Q6H PRN Selvin Perez PA-C     • albuterol  5 mg Nebulization BID PRN Selvin Perez PA-C     • ALPRAZolam  1 mg Oral HS PRN Diandra Daugherty PA-C     • atovaquone  1,500 mg Oral Daily Diandra Daugherty PA-C     • docusate sodium  100 mg Oral BID Selvin Perez PA-C     • dronabinol  5 mg Oral TID Selvin Perez PA-C     • heparin (porcine)  3-30 Units/kg/hr (Order-Specific) Intravenous Titrated Anahi Solis MD 18 Units/kg/hr (11/09/22 0908)   • heparin (porcine)  1,800 Units Intravenous Q1H PRN Anahi Solis MD     • heparin (porcine)  3,600 Units Intravenous Q1H PRN Juan Lara MD     • HYDROmorphone  0 5 mg Intravenous Q6H PRN Juan Laar MD     • insulin glargine  3 Units Subcutaneous HS Diandra Daugherty PA-C     • insulin lispro  1-5 Units Subcutaneous 4x Daily (AC & HS) Diandra Daugherty PA-C     • insulin lispro  3 Units Subcutaneous TID With Meals Tri Santizo PA-C     • methocarbamol  500 mg Oral Q6H PRN Tri Santizo PA-C     • metoclopramide  10 mg Oral TID With Meals Tri Santizo PA-C     • oxyCODONE  10 mg Oral Q4H PRN Juan Lara MD     • oxyCODONE  5 mg Oral Q4H PRN Juan Lara MD     • pancrelipase (Lip-Prot-Amyl)  48,000 Units Oral TID With Meals Tri Santizo PA-C     • pantoprazole  40 mg Oral Early Morning Diandra Daugherty PA-C     • predniSONE  10 mg Oral Daily Diandra Daugherty PA-C     • pregabalin  100 mg Oral BID Diandra Daugherty PA-C     • tacrolimus  0 5 mg Oral BID Tri Santizo PA-C     • valACYclovir  500 mg Oral Daily Tri Santizo PA-C          Today, Patient Was Seen By: Inga Clark MD    ** Please Note: Dictation voice to text software may have been used in the creation of this document   **

## 2022-11-10 LAB
ABO GROUP BLD BPU: NORMAL
BPU ID: NORMAL
CROSSMATCH: NORMAL
UNIT DISPENSE STATUS: NORMAL
UNIT PRODUCT CODE: NORMAL
UNIT PRODUCT VOLUME: 350 ML
UNIT RH: NORMAL

## 2022-11-10 NOTE — PLAN OF CARE
Problem: MOBILITY - ADULT  Goal: Maintain or return to baseline ADL function  Description: INTERVENTIONS:  -  Assess patient's ability to carry out ADLs; assess patient's baseline for ADL function and identify physical deficits which impact ability to perform ADLs (bathing, care of mouth/teeth, toileting, grooming, dressing, etc )  - Assess/evaluate cause of self-care deficits   - Assess range of motion  - Assess patient's mobility; develop plan if impaired  - Assess patient's need for assistive devices and provide as appropriate  - Encourage maximum independence but intervene and supervise when necessary  - Involve family in performance of ADLs  - Assess for home care needs following discharge   - Consider OT consult to assist with ADL evaluation and planning for discharge  - Provide patient education as appropriate  Outcome: Progressing  Goal: Maintains/Returns to pre admission functional level  Description: INTERVENTIONS:  - Perform BMAT or MOVE assessment daily    - Set and communicate daily mobility goal to care team and patient/family/caregiver  - Collaborate with rehabilitation services on mobility goals if consulted  - Perform Range of Motion 4 times a day  - Reposition patient every 2 hours    - Dangle patient 3 times a day  - Stand patient 3 times a day  - Ambulate patient 3 times a day  - Out of bed to chair 3 times a day   - Out of bed for meals 3 times a day  - Out of bed for toileting  - Record patient progress and toleration of activity level   Outcome: Progressing     Problem: Potential for Falls  Goal: Patient will remain free of falls  Description: INTERVENTIONS:  - Educate patient/family on patient safety including physical limitations  - Instruct patient to call for assistance with activity   - Consult OT/PT to assist with strengthening/mobility   - Keep Call bell within reach  - Keep bed low and locked with side rails adjusted as appropriate  - Keep care items and personal belongings within reach  - Initiate and maintain comfort rounds  - Make Fall Risk Sign visible to staff  - Offer Toileting every 2 Hours, in advance of need  - Initiate/Maintain bed alarm  - Obtain necessary fall risk management equipment: bed alarm  - Apply yellow socks and bracelet for high fall risk patients  - Consider moving patient to room near nurses station  Outcome: Progressing

## 2022-11-13 PROBLEM — J18.0 BRONCHOPNEUMONIA: Status: RESOLVED | Noted: 2022-09-12 | Resolved: 2022-11-13

## 2022-11-14 DIAGNOSIS — D84.9 IMMUNOSUPPRESSION (HCC): ICD-10-CM

## 2022-11-14 DIAGNOSIS — E84.9 CYSTIC FIBROSIS (HCC): Primary | ICD-10-CM

## 2022-11-15 ENCOUNTER — PREP FOR PROCEDURE (OUTPATIENT)
Dept: INTERVENTIONAL RADIOLOGY/VASCULAR | Facility: CLINIC | Age: 47
End: 2022-11-15

## 2022-11-15 DIAGNOSIS — E84.9 CYSTIC FIBROSIS (HCC): Primary | ICD-10-CM

## 2022-11-17 ENCOUNTER — TELEPHONE (OUTPATIENT)
Dept: GASTROENTEROLOGY | Facility: CLINIC | Age: 47
End: 2022-11-17

## 2022-11-17 NOTE — TELEPHONE ENCOUNTER
Dr Enedelia Trammell (lung transplant doctor from [facility]) is requesting a call back from Dr Abimael Hutchison regarding this patient   Dr Reva Resendiz cell phone number is [phone]

## 2022-11-22 ENCOUNTER — PREP FOR PROCEDURE (OUTPATIENT)
Dept: INTERVENTIONAL RADIOLOGY/VASCULAR | Facility: CLINIC | Age: 47
End: 2022-11-22

## 2022-11-22 ENCOUNTER — HOSPITAL ENCOUNTER (OUTPATIENT)
Dept: NON INVASIVE DIAGNOSTICS | Facility: HOSPITAL | Age: 47
Discharge: HOME | End: 2022-11-22
Attending: STUDENT IN AN ORGANIZED HEALTH CARE EDUCATION/TRAINING PROGRAM

## 2022-11-22 DIAGNOSIS — E84.9 CYSTIC FIBROSIS (HCC): Primary | ICD-10-CM

## 2022-11-22 DIAGNOSIS — E84.9 CYSTIC FIBROSIS (HCC): ICD-10-CM

## 2022-11-22 RX ADMIN — IOHEXOL 10 ML: 350 INJECTION, SOLUTION INTRAVENOUS at 08:52

## 2022-11-22 NOTE — BRIEF OP NOTE (RAD/CATH)
INTERVENTIONAL RADIOLOGY PROCEDURE NOTE    Date: 11/22/2022    Procedure:   Procedure Summary     Date: 11/22/22 Room / Location: [facility] Cardiac Cath Lab    Anesthesia Start:  Anesthesia Stop:     Procedure: IR PORT CHECK AND TPA INFUSION Diagnosis:       Cystic fibrosis ([address] )      (nonfunctioning port)    Scheduled Providers: Moo Jaquez MD Responsible Provider:     Anesthesia Type: Not recorded ASA Status: Not recorded          Preoperative diagnosis:   1  Cystic fibrosis (HCC)         Postoperative diagnosis: Same  Surgeon: Moo Jaquez MD     Assistant: None  No qualified resident was available  Blood loss: 0 ml    Specimens: none  Findings:   Port check showed fibrin sheath  After discussion with the patient, she has had multiple episodes of tPA infusions at other centers which have not helped things  We'll reschedule her for a fibrin sheath stripping  Complications: None immediate      Anesthesia: none

## 2022-11-22 NOTE — SEDATION DOCUMENTATION
TPA unsuccessful in past month so Dr Cira Avelar discussed appropriate next procedure to be scheduled in next 2 weeks

## 2022-11-25 ENCOUNTER — HOSPITAL ENCOUNTER (OUTPATIENT)
Dept: NON INVASIVE DIAGNOSTICS | Facility: HOSPITAL | Age: 47
Discharge: HOME | End: 2022-11-25
Attending: STUDENT IN AN ORGANIZED HEALTH CARE EDUCATION/TRAINING PROGRAM

## 2022-11-25 VITALS
RESPIRATION RATE: 18 BRPM | HEIGHT: 63 IN | OXYGEN SATURATION: 96 % | SYSTOLIC BLOOD PRESSURE: 112 MMHG | HEART RATE: 75 BPM | BODY MASS INDEX: 18.98 KG/M2 | DIASTOLIC BLOOD PRESSURE: 72 MMHG | WEIGHT: 107.14 LBS | TEMPERATURE: 98.6 F

## 2022-11-25 DIAGNOSIS — E84.9 CYSTIC FIBROSIS (HCC): ICD-10-CM

## 2022-11-25 LAB
EXT PREGNANCY TEST URINE: NEGATIVE
EXT. CONTROL: NORMAL

## 2022-11-25 RX ORDER — LEVOFLOXACIN 5 MG/ML
500 INJECTION, SOLUTION INTRAVENOUS ONCE
Status: COMPLETED | OUTPATIENT
Start: 2022-11-25 | End: 2022-11-25

## 2022-11-25 RX ORDER — MIDAZOLAM HYDROCHLORIDE 2 MG/2ML
INJECTION, SOLUTION INTRAMUSCULAR; INTRAVENOUS AS NEEDED
Status: COMPLETED | OUTPATIENT
Start: 2022-11-25 | End: 2022-11-25

## 2022-11-25 RX ORDER — FENTANYL CITRATE 50 UG/ML
INJECTION, SOLUTION INTRAMUSCULAR; INTRAVENOUS AS NEEDED
Status: COMPLETED | OUTPATIENT
Start: 2022-11-25 | End: 2022-11-25

## 2022-11-25 RX ADMIN — FENTANYL CITRATE 50 MCG: 50 INJECTION, SOLUTION INTRAMUSCULAR; INTRAVENOUS at 09:47

## 2022-11-25 RX ADMIN — MIDAZOLAM HYDROCHLORIDE 1 MG: 1 INJECTION, SOLUTION INTRAMUSCULAR; INTRAVENOUS at 09:24

## 2022-11-25 RX ADMIN — MIDAZOLAM HYDROCHLORIDE 1 MG: 1 INJECTION, SOLUTION INTRAMUSCULAR; INTRAVENOUS at 09:30

## 2022-11-25 RX ADMIN — FENTANYL CITRATE 50 MCG: 50 INJECTION, SOLUTION INTRAMUSCULAR; INTRAVENOUS at 09:24

## 2022-11-25 RX ADMIN — MIDAZOLAM HYDROCHLORIDE 1 MG: 1 INJECTION, SOLUTION INTRAMUSCULAR; INTRAVENOUS at 09:46

## 2022-11-25 RX ADMIN — Medication 5 ML: at 09:34

## 2022-11-25 RX ADMIN — FENTANYL CITRATE 50 MCG: 50 INJECTION, SOLUTION INTRAMUSCULAR; INTRAVENOUS at 09:30

## 2022-11-25 RX ADMIN — LEVOFLOXACIN 500 MG: 5 INJECTION, SOLUTION INTRAVENOUS at 09:18

## 2022-11-25 NOTE — H&P
Interventional Radiology Preprocedure Note    History/Indication for procedure:   Lata Blount is a 42 y o  female with malfunctioning chest port    Relevant past medical history:    Past Medical History:   Diagnosis Date   • ABPA (allergic bronchopulmonary aspergillosis) ([facility] ) 7/8/2012   • Acute kidney injury ([facility] ) 9/8/2017   • Acute on chronic respiratory failure ([facility] ) 3/4/2017    Last Assessment & Plan:  Formatting of this note might be different from the original  Recurrent with clinical worsening with enlarging PTX, s/p multiple chest tubes, but new fevers 6/19 and worsening leukocytosis indicating infection as cause of 6/19 decompensation with profound hypoxia in setting of shunting through right lung and continued pneumothorax s/p 2nd chest tube placement  6/27: Right   • Anaphylaxis 6/6/2022   • Asthma    • Chest pain 9/21/2021   • Chronic pain    • Chronic respiratory failure with hypoxia ([facility] ) 9/7/2021   • COVID-19 6/3/2022   • Cystic fibrosis ([facility] )    • Diabetes mellitus ([facility] )    • GERD (gastroesophageal reflux disease)    • HBP (high blood pressure)    • HCAP (healthcare-associated pneumonia) 3/6/2020   • Influenza B 2/23/2020   • Kidney stone    • Neutropenic fever ([facility] ) 9/26/2020   • Pneumonia due to Pseudomonas species ([facility] ) 10/28/2010    Last Assessment & Plan:  Formatting of this note might be different from the original  Polymicrobial pna w/ pseudomonas, M   Abscessus, Cadida glabrata/albicans   • PONV (postoperative nausea and vomiting)    • Pseudomonas aeruginosa infection 9/20/2012   • Transplant recipient     Lung- 2013, 2017   • Wellness examination 2/4/2013    Formatting of this note might be different from the original  Discussed with patient and acknowledgement form signed on 2/4/2013 (ALYSHA)     Patient Active Problem List   Diagnosis   • S/P pneumonectomy   • Type 1 diabetes ([facility] )   • Pancreatic insufficiency   • Asthma   • Chronic pain disorder   • DVT (deep venous thrombosis) [facility])   • Acute on chronic anemia   • Long term current use of bisphosphonates   • Long term current use of systemic steroids   • Osteoporosis   • Anxiety   • CKD (chronic kidney disease) stage 2, GFR 60-89 ml/min   • Chronic sinusitis   • Colon polyp   • Esophageal reflux   • Hiatal hernia   • Immunosuppression (HCC)   • Severe protein-calorie malnutrition (HCC)   • Transplant rejection   • Cystic fibrosis (HCC)   • Essential hypertension   • Hypercholesteremia   • Weakness of right lower extremity   • Moderate protein-calorie malnutrition (HCC)   • Neuropathic pain of right foot   • Ulcerative pancolitis without complication (HCC)   • Gastroparesis   • Abnormal uterine bleeding (AUB)   • Jejunostomy tube present (HCC)   • Pulmonary hypertension (HCC)   • Renal calculus   • Nodule of left lung   • Rib pain   • Right leg pain   • URI, acute   • Anemia   • Pulmonary embolus (HCC)       /87   Pulse 90   Temp 97 9 °F (36 6 °C) (Temporal)   Resp 18   Ht 5' 2 5" (1 588 m)   Wt 48 6 kg (107 lb 2 3 oz)   SpO2 96%   BMI 19 28 kg/m²     Medications:    Inpatient Medications:     Scheduled Medications:      Infusions:  No current facility-administered medications for this encounter  PRN:      Outpatient Medications:  Current Outpatient Medications on File Prior to Encounter   Medication Sig Dispense Refill   • acetaminophen (TYLENOL) 325 mg tablet Take 3 tablets (975 mg total) by mouth every 8 (eight) hours  0   • albuterol (2 5 mg/3 mL) 0 083 % nebulizer solution Take 6 mL (5 mg total) by nebulization 2 (two) times a day  0   • ALPRAZolam (XANAX) 1 mg tablet Take 1 tablet (1 mg total) by mouth daily at bedtime as needed for anxiety or sleep 30 tablet 0   • apixaban (Eliquis) 5 mg Take 2 tablets (10 mg total) by mouth 2 (two) times a day for 7 days, THEN 1 tablet (5 mg total) 2 (two) times a day for 23 days   74 tablet 0   • atovaquone (MEPRON) 750 mg/5 mL suspension Take 1,500 mg by mouth daily     • dexlansoprazole (DEXILANT) 60 MG capsule Take 1 capsule (60 mg total) by mouth in the morning   30 capsule 0   • insulin aspart (NovoLOG FlexPen) 100 UNIT/ML injection pen Inject 3 units three times a day with meals 15 mL 3   • Insulin Glargine Solostar (Semglee) 100 UNIT/ML SOPN Inject 5 units under the skin every bedtime 15 mL 3   • pancrelipase, Lip-Prot-Amyl, (CREON) 24,000 units Take 48,000 Units by mouth 3 (three) times a day with meals     • predniSONE 10 mg tablet Take 10 mg by mouth daily     • pregabalin (Lyrica) 100 mg capsule Take 1 capsule (100 mg total) by mouth 2 (two) times a day 60 capsule 0   • tacrolimus (PROGRAF) 1 mg capsule Take 0 5 mg by mouth 2 (two) times a day     • valACYclovir (VALTREX) 500 mg tablet Take 500 mg by mouth daily     • BD Pen Needle Abi U/F 32G X 4 MM MISC 4 times daily 400 each 1   • Continuous Blood Gluc  (Dexcom G6 ) SUKHI Use 1 Units continuous 1 each 0   • Continuous Blood Gluc Sensor (Dexcom G6 Sensor) MISC Use 1 Units every 7 days 12 each 3   • Continuous Blood Gluc Transmit (Dexcom G6 Transmitter) MISC Use 1 Units continuous 3 each 0   • dronabinol (MARINOL) 5 MG capsule TAKE 1 CAPSULE (5 MG TOTAL) BY MOUTH 3 (THREE) TIMES A DAY (Patient not taking: Reported on 11/9/2022) 90 capsule 3   • Insulin Pen Needle (Comfort EZ Pen Needles) 33G X 4 MM MISC Use to inject insulin 4 times a day 100 each 3   • linaCLOtide (Linzess) 72 MCG CAPS Take 72 mcg by mouth daily (Patient not taking: Reported on 11/9/2022)     • medroxyPROGESTERone (DEPO-PROVERA) 150 mg/mL injection Inject 1 mL (150 mg total) into a muscle every 3 (three) months 1 mL 3   • metoclopramide (REGLAN) 10 mg tablet TAKE 1 TABLET BY MOUTH THREE TIMES A DAY WITH MEALS (Patient not taking: Reported on 11/9/2022) 90 tablet 1   • naloxone (NARCAN) 4 mg/0 1 mL nasal spray 4 mg into each nostril     • Respiratory Therapy Supplies (Nebulizer) SUKHI Please dispense nebulizer machine     • sodium chloride (OCEAN) 0 65 % nasal spray 2 sprays into each nostril 2 (two) times a day (Patient not taking: Reported on 11/9/2022) 30 mL 1   • [DISCONTINUED] cyclobenzaprine (FLEXERIL) 5 mg tablet Take 1-2 tabs every 8 hours as needed for muscle pain/spasm (Patient not taking: No sig reported) 90 tablet 0   • [DISCONTINUED] famotidine (PEPCID) 40 MG tablet TAKE 1 TABLET BY MOUTH EVERYDAY AT BEDTIME (Patient not taking: No sig reported) 90 tablet 1   • [DISCONTINUED] sucralfate (CARAFATE) 1 g/10 mL suspension Take 10 mL (1 g total) by mouth 4 (four) times a day (with meals and at bedtime) (Patient not taking: Reported on 10/28/2022) 420 mL 1   • [DISCONTINUED] traZODone (DESYREL) 50 mg tablet Take 0 5 tablets (25 mg total) by mouth daily at bedtime  0     No current facility-administered medications on file prior to encounter  Allergies   Allergen Reactions   • Bebtelovimab Anaphylaxis   • Vancomycin Angioedema and Hives   • Gabapentin Hallucinations   • Ceftazidime Headache     Severe headaches after desensitization   • Nsaids Other (See Comments)     Lung transplant increases risk of renal toxicity, call lung txp provider to discuss if needed       Anticoagulants:  Eliquis    ASA classification: ASA 3 - Patient with moderate systemic disease with functional limitations    Airway Assessment: II (hard and soft palate, upper portion of tonsils anduvula visible)    Relevant family history: None    Relevant review of systems: None    Prior sedation/anesthesia: yes    Can the patient lie flat? Yes     Does patient have sleep apnea?  No    If yes, does patient use CPAP? not applicable    NPO Status: yes    Labs:   CBC with diff:   Lab Results   Component Value Date    WBC 5 96 11/09/2022    HGB 9 3 (L) 11/09/2022    HCT 31 2 (L) 11/09/2022    MCV 81 (L) 11/09/2022     11/09/2022    MCH 24 1 (L) 11/09/2022    MCHC 29 8 (L) 11/09/2022    RDW 21 1 (H) 11/09/2022    MPV 10 4 11/09/2022    NRBC 0 11/08/2022     BMP/CMP:  Lab Results   Component Value Date    K 4 5 11/09/2022     11/09/2022    CO2 21 11/09/2022    BUN 15 11/09/2022    CREATININE 1 00 11/09/2022    CALCIUM 8 2 (L) 11/09/2022    AST 32 11/08/2022    ALT 18 11/08/2022    ALKPHOS 145 (H) 11/08/2022    EGFR 68 11/09/2022   ,     Coags:   Lab Results   Component Value Date    PTT 91 (H) 11/09/2022    INR 1 20 (H) 11/09/2022   ,          Relevant imaging studies:   CT and images forport check reviewed  Directed physical examination:  No apparent distress  AO x3  Normal respiratory effort  Regular rate and rhythm    Assessment/Plan:   Right chest port removal, fibrin sheath stripping and port replacement within the same pocket  Sedation/Anesthesia plan: Moderate sedation will be used as needed for procedure      Consent with alternatives to the procedure, risks and benefits have been explained and discussed with the patient/patient's family: yes

## 2022-11-25 NOTE — BRIEF OP NOTE (RAD/CATH)
INTERVENTIONAL RADIOLOGY PROCEDURE NOTE    Date: 11/25/2022    Procedure:   Procedure Summary     Date: 11/25/22 Room / Location: [address] [place] [address] Cardiac Cath Lab    Anesthesia Start:  Anesthesia Stop:     Procedure: IR PORT STRIPPING Diagnosis:       Cystic fibrosis ([address] )      (fibrin sheath)    Scheduled Providers: Michael Mcguire MD Responsible Provider:     Anesthesia Type: Not recorded ASA Status: Not recorded          Preoperative diagnosis:   1  Cystic fibrosis (HCC)         Postoperative diagnosis: Same  Surgeon: Michael Mcguire MD     Assistant: None  No qualified resident was available  Blood loss:  Minimal    Specimens:  None     Findings:   Removal of the pre-existing port  Fibrin sheath stripping  Replacement of right-sided chest port within the same pocket  Complications: None immediate      Anesthesia: conscious sedation

## 2022-11-25 NOTE — DISCHARGE INSTRUCTIONS
Implanted Venous Access Port     WHAT YOU NEED TO KNOW:   An implanted venous access port is a device used to give treatments and take blood  It may also be called a central venous access device (CVAD)  The port is a small container that is placed under your skin, usually in your upper chest  The port is attached to a catheter that enters a large vein  DISCHARGE INSTRUCTIONS:   Resume your normal diet  Small sips of flat soda will help with mild nausea  Prevent an infection:   Wash your hands often  Use soap and water  Clean your hands before and after you care for your port  Remind everyone who cares for your port to wash their hands  Check your skin for infection every day  Look for redness, swelling, or fluid oozing from the port site  Care for your port:   1  You may shower beginning 48 hours after procedure  2   Leave glue in place  3  It is normal for some bruising to occur  4  Use Tylenol for pain  5  Limit use of arm on the side that your port was placed  Lift nothing heavier than 5 pounds for 1 week, and then gradually increase activity as tolerated  6  DO NOT apply ointment, lotion or cream to port site until incision is healed  Allow glue to fall off  DO NOT attempt to peel glue from skin even it it begins to flake  7  After the port incision is healed you may swim, bathe  Notify the Interventional Radiologist if you have any of the followin  Fever above 101 F    2  Increased redness or swelling after 1st day  3  Increased pain after 1st day  4  Any sign of infection (drainage from port site, skin separation, hot to touch)  5  Persistent nausea or vomiting      Contact Interventional Radiology at [phone]

## 2022-12-01 DIAGNOSIS — I26.99 ACUTE PULMONARY EMBOLISM WITHOUT ACUTE COR PULMONALE, UNSPECIFIED PULMONARY EMBOLISM TYPE (HCC): Primary | ICD-10-CM

## 2022-12-01 DIAGNOSIS — M54.16 LUMBAR RADICULOPATHY: ICD-10-CM

## 2022-12-01 DIAGNOSIS — M79.671 RIGHT FOOT PAIN: ICD-10-CM

## 2022-12-01 DIAGNOSIS — G89.4 CHRONIC PAIN SYNDROME: ICD-10-CM

## 2022-12-01 DIAGNOSIS — E84.9 CYSTIC FIBROSIS (HCC): ICD-10-CM

## 2022-12-01 RX ORDER — ALPRAZOLAM 1 MG/1
1 TABLET ORAL
Qty: 30 TABLET | Refills: 0 | Status: SHIPPED | OUTPATIENT
Start: 2022-12-01

## 2022-12-01 RX ORDER — OXYCODONE HYDROCHLORIDE 10 MG/1
10 TABLET ORAL EVERY 6 HOURS PRN
Qty: 56 TABLET | Refills: 0 | Status: SHIPPED | OUTPATIENT
Start: 2022-12-01 | End: 2022-12-15

## 2022-12-02 RX ORDER — PREGABALIN 100 MG/1
CAPSULE ORAL
Qty: 60 CAPSULE | Refills: 0 | Status: SHIPPED | OUTPATIENT
Start: 2022-12-02

## 2022-12-02 NOTE — TELEPHONE ENCOUNTER
Mario Alberto Box has requested a refill of pregbalin (LYRICA) 100 mg capsule  Would a refill be appropriate?

## 2022-12-15 ENCOUNTER — TELEPHONE (OUTPATIENT)
Dept: INTERNAL MEDICINE CLINIC | Facility: CLINIC | Age: 47
End: 2022-12-15

## 2022-12-15 NOTE — TELEPHONE ENCOUNTER
Via Batsheva Morales has requested to change her virtual appointment from today, 12/15/22, at 11:00Am to tomorrow, 12/16/22, at 11:00AM  Attempted to call Lynn Mondragon to inform her the IM office does not have availability tomorrow at 11:00AM, but does have 10:30AM available  Rescheduled Zelonisadicek to tomorrow 12/16/22 at 10:30AM  L/m to Tong to call back if 10:30AM slot is acceptable or if it must be rescheduled

## 2022-12-16 ENCOUNTER — TELEMEDICINE (OUTPATIENT)
Dept: INTERNAL MEDICINE CLINIC | Facility: CLINIC | Age: 47
End: 2022-12-16

## 2022-12-16 DIAGNOSIS — J20.9 ACUTE BRONCHITIS, UNSPECIFIED ORGANISM: Primary | ICD-10-CM

## 2022-12-16 RX ORDER — AZITHROMYCIN 250 MG/1
TABLET, FILM COATED ORAL
Qty: 6 TABLET | Refills: 0 | Status: SHIPPED | OUTPATIENT
Start: 2022-12-16 | End: 2022-12-21

## 2022-12-16 NOTE — ASSESSMENT & PLAN NOTE
· 81-year-old female with past medical history of cystic fibrosis status post bilateral lung transplant with endorsed worsening productive cough with greenish sputum over the past 10 days as well as dyspnea however denies any fever, chills, wheezing, denies any postnasal drainage as well as no runny nose or congestion  Denies any sick contacts and no recent travel history  · Have tried Mucinex twice a day at home with no improvement  · No home COVID test patient tested positive for COVID back in the summertime  Has 4 COVID-vaccine no recent influenza vaccine  · Plan  · Continue with Mucinex twice a day  · Start azithromycin for 4 days  · Increase hydration and start teaspoon of honey twice a day  · Patient was advised if no improvement by Monday call back to the office for further management and evaluation

## 2022-12-16 NOTE — PROGRESS NOTES
Virtual Regular Visit    Verification of patient location:    Patient is located in the following state in which I hold an active license [place]      Assessment/Plan:    Problem List Items Addressed This Visit        Respiratory    Acute bronchitis - Primary     · 24-year-old female with past medical history of cystic fibrosis status post bilateral lung transplant with endorsed worsening productive cough with greenish sputum over the past 10 days as well as dyspnea however denies any fever, chills, wheezing, denies any postnasal drainage as well as no runny nose or congestion  Denies any sick contacts and no recent travel history  · Have tried Mucinex twice a day at home with no improvement  · No home COVID test patient tested positive for COVID back in the summertime  Has 4 COVID-vaccine no recent influenza vaccine  · Plan  · Continue with Mucinex twice a day  · Start azithromycin for 4 days  · Increase hydration and start teaspoon of honey twice a day  · Patient was advised if no improvement by Monday call back to the office for further management and evaluation  Relevant Medications    azithromycin (Zithromax) 250 mg tablet            Reason for visit is   Chief Complaint   Patient presents with   • Virtual Regular Visit        Encounter provider Eric Barry MD    Provider located at [address] [place] [id number]  [phone]      Recent Visits  Date Type Provider Dept   12/15/22 Telephone Travis Braun [facility]   Showing recent visits within past 7 days and meeting all other requirements  Today's Visits  Date Type Provider Dept   12/16/22 Tj Hardin MD [facility]   Showing today's visits and meeting all other requirements  Future Appointments  No visits were found meeting these conditions    Showing future appointments within next 150 days and meeting all other requirements       The patient was identified by name and date of birth  Opal Merino was informed that this is a telemedicine visit and that the visit is being conducted through the [address] [employer] platform  She agrees to proceed     My office door was closed  No one else was in the room  She acknowledged consent and understanding of privacy and security of the video platform  The patient has agreed to participate and understands they can discontinue the visit at any time  Patient is aware this is a billable service  Soha Valdez is a 42 y o  female 62-year-old female past medical history of cystic fibrosis status post bilateral lung transplant, seen via virtual visit  Patient stated that started about 10 days ago with productive intractable cough with greenish sputum and shortness of breath however worsening over the past few days  Patient denies any fever, chills, headaches as well as no fatigue myalgias or arthralgias  Patient have tried Mucinex twice a day at home without improvement  Patient denies any sick contacts as well as no recent traveling  Denies any recent COVID-19 test as well as no test for influenza  Patient is vaccinated for COVID-19 total of 4 however no recent influenza vaccine   Past Medical History:   Diagnosis Date   • ABPA (allergic bronchopulmonary aspergillosis) ([facility] ) 7/8/2012   • Acute kidney injury ([facility] ) 9/8/2017   • Acute on chronic respiratory failure ([facility] ) 3/4/2017    Last Assessment & Plan:  Formatting of this note might be different from the original  Recurrent with clinical worsening with enlarging PTX, s/p multiple chest tubes, but new fevers 6/19 and worsening leukocytosis indicating infection as cause of 6/19 decompensation with profound hypoxia in setting of shunting through right lung and continued pneumothorax s/p 2nd chest tube placement    6/27: Right   • Anaphylaxis 6/6/2022   • Asthma    • Chest pain 9/21/2021   • Chronic pain • Chronic respiratory failure with hypoxia ([facility] ) 9/7/2021   • COVID-19 6/3/2022   • Cystic fibrosis ([facility] )    • Diabetes mellitus ([facility] )    • GERD (gastroesophageal reflux disease)    • HBP (high blood pressure)    • HCAP (healthcare-associated pneumonia) 3/6/2020   • Influenza B 2/23/2020   • Kidney stone    • Neutropenic fever ([facility] ) 9/26/2020   • Pneumonia due to Pseudomonas species ([facility] ) 10/28/2010    Last Assessment & Plan:  Formatting of this note might be different from the original  Polymicrobial pna w/ pseudomonas, M  Abscessus, Cadida glabrata/albicans   • PONV (postoperative nausea and vomiting)    • Pseudomonas aeruginosa infection 9/20/2012   • Transplant recipient     Lung- 2013, 2017   • Wellness examination 2/4/2013    Formatting of this note might be different from the original  Discussed with patient and acknowledgement form signed on 2/4/2013 (ALYSHA)       Past Surgical History:   Procedure Laterality Date   • APPENDECTOMY     • AUGMENTATION MAMMAPLASTY Bilateral 2004   • AUGMENTATION MAMMAPLASTY Right 2021    removed    • BREAST SURGERY      Aug     • BRONCHOSCOPY     • CHOLECYSTECTOMY     • COLONOSCOPY     • EGD  07/2019    Yeast Infect      • FL RETROGRADE PYELOGRAM  1/9/2020   • FL RETROGRADE PYELOGRAM  2/4/2020   • GASTROSTOMY TUBE, PLACE     • IR BALLOON-OCCLUDED ANTEGRADE TRANSVENOUS OBLITERATION (BATO)  11/22/2022   • IR PORT PLACEMENT  3/22/2022   • IR PORT REMOVAL  1/10/2022   • IR PORT STRIPPING  11/25/2022   • IR TUNNELED CENTRAL LINE PLACEMENT  1/13/2022   • LUNG SURGERY     • LUNG TRANSPLANT      X2   • AK CYSTO/URETERO W/LITHOTRIPSY &INDWELL STENT INSRT Right 2/4/2020    Procedure: CYSTOSCOPY URETEROSCOPY WITH LITHOTRIPSY HOLMIUM LASER, RETROGRADE PYELOGRAM AND INSERTION STENT URETERAL;  Surgeon: Urbano Stevens MD;  Location: [facility];  Service: Urology   • AK CYSTOURETHROSCOPY,URETER CATHETER Right 1/9/2020    Procedure: CYSTOSCOPY RETROGRADE PYELOGRAM WITH INSERTION STENT URETERAL;  Surgeon: Lamberto Velez MD;  Location: [facility];  Service: Urology   • US GUIDED VASCULAR ACCESS  1/17/2017       Current Outpatient Medications   Medication Sig Dispense Refill   • azithromycin (Zithromax) 250 mg tablet Take 2 tablets (500 mg total) by mouth daily for 1 day, THEN 1 tablet (250 mg total) daily for 4 days  6 tablet 0   • acetaminophen (TYLENOL) 325 mg tablet Take 3 tablets (975 mg total) by mouth every 8 (eight) hours  0   • albuterol (2 5 mg/3 mL) 0 083 % nebulizer solution Take 6 mL (5 mg total) by nebulization 2 (two) times a day  0   • ALPRAZolam (XANAX) 1 mg tablet Take 1 tablet (1 mg total) by mouth daily at bedtime as needed for anxiety or sleep 30 tablet 0   • apixaban (Eliquis) 5 mg Take 2 tablets (10 mg total) by mouth 2 (two) times a day for 7 days, THEN 1 tablet (5 mg total) 2 (two) times a day for 23 days  74 tablet 0   • atovaquone (MEPRON) 750 mg/5 mL suspension Take 1,500 mg by mouth daily     • BD Pen Needle Abi U/F 32G X 4 MM MISC 4 times daily 400 each 1   • Continuous Blood Gluc  (Dexcom G6 ) SUKHI Use 1 Units continuous 1 each 0   • Continuous Blood Gluc Sensor (Dexcom G6 Sensor) MISC Use 1 Units every 7 days 12 each 3   • Continuous Blood Gluc Transmit (Dexcom G6 Transmitter) MISC Use 1 Units continuous 3 each 0   • dexlansoprazole (DEXILANT) 60 MG capsule Take 1 capsule (60 mg total) by mouth in the morning   30 capsule 0   • dronabinol (MARINOL) 5 MG capsule TAKE 1 CAPSULE (5 MG TOTAL) BY MOUTH 3 (THREE) TIMES A DAY (Patient not taking: Reported on 11/9/2022) 90 capsule 3   • insulin aspart (NovoLOG FlexPen) 100 UNIT/ML injection pen Inject 3 units three times a day with meals 15 mL 3   • Insulin Glargine Solostar (Semglee) 100 UNIT/ML SOPN Inject 5 units under the skin every bedtime 15 mL 3   • Insulin Pen Needle (Comfort EZ Pen Needles) 33G X 4 MM MISC Use to inject insulin 4 times a day 100 each 3   • linaCLOtide (Linzess) 72 MCG CAPS Take 72 mcg by mouth daily (Patient not taking: Reported on 11/9/2022)     • medroxyPROGESTERone (DEPO-PROVERA) 150 mg/mL injection Inject 1 mL (150 mg total) into a muscle every 3 (three) months 1 mL 3   • metoclopramide (REGLAN) 10 mg tablet TAKE 1 TABLET BY MOUTH THREE TIMES A DAY WITH MEALS (Patient not taking: Reported on 11/9/2022) 90 tablet 1   • naloxone (NARCAN) 4 mg/0 1 mL nasal spray 4 mg into each nostril     • pancrelipase, Lip-Prot-Amyl, (CREON) 24,000 units Take 48,000 Units by mouth 3 (three) times a day with meals     • predniSONE 10 mg tablet Take 10 mg by mouth daily     • pregabalin (LYRICA) 100 mg capsule TAKE 1 CAPSULE BY MOUTH TWICE A DAY 60 capsule 0   • Respiratory Therapy Supplies (Nebulizer) SUKHI Please dispense nebulizer machine     • sodium chloride (OCEAN) 0 65 % nasal spray 2 sprays into each nostril 2 (two) times a day (Patient not taking: Reported on 11/9/2022) 30 mL 1   • tacrolimus (PROGRAF) 1 mg capsule Take 0 5 mg by mouth 2 (two) times a day     • valACYclovir (VALTREX) 500 mg tablet Take 500 mg by mouth daily       No current facility-administered medications for this visit  Allergies   Allergen Reactions   • Bebtelovimab Anaphylaxis   • Vancomycin Angioedema and Hives   • Gabapentin Hallucinations   • Ceftazidime Headache     Severe headaches after desensitization   • Nsaids Other (See Comments)     Lung transplant increases risk of renal toxicity, call lung txp provider to discuss if needed       Review of Systems   Constitutional: Positive for fatigue  Negative for activity change, appetite change, chills, diaphoresis and fever  HENT: Negative for congestion, ear pain, postnasal drip, rhinorrhea, sneezing and sore throat  Eyes: Negative for pain and visual disturbance  Respiratory: Positive for cough, chest tightness and shortness of breath  Negative for choking and wheezing  Cardiovascular: Negative for chest pain, palpitations and leg swelling  Gastrointestinal: Negative for abdominal pain, diarrhea, nausea and vomiting  Genitourinary: Negative for dysuria and hematuria  Musculoskeletal: Negative for arthralgias, back pain and myalgias  Skin: Negative for color change and rash  Neurological: Negative for seizures and syncope  All other systems reviewed and are negative  Video Exam    There were no vitals filed for this visit  Physical Exam  Vitals reviewed  Constitutional:       General: She is not in acute distress  Appearance: She is not ill-appearing or toxic-appearing  HENT:      Head: Normocephalic and atraumatic  Right Ear: External ear normal       Left Ear: External ear normal       Nose: Nose normal       Mouth/Throat:      Mouth: Mucous membranes are moist    Eyes:      Conjunctiva/sclera: Conjunctivae normal    Pulmonary:      Effort: Pulmonary effort is normal  No respiratory distress        Comments: Cough  Psychiatric:         Mood and Affect: Mood normal           I spent 15 minutes directly with the patient during this visit

## 2022-12-20 ENCOUNTER — TELEPHONE (OUTPATIENT)
Dept: INTERNAL MEDICINE CLINIC | Facility: CLINIC | Age: 47
End: 2022-12-20

## 2022-12-20 NOTE — TELEPHONE ENCOUNTER
Attempted to call Lewis Delacruz to schedule an appointment with the [facility] office to address sleeping aid, xanax dosing, and reassess her cough  L/m to schedule an appointment

## 2022-12-27 ENCOUNTER — OFFICE VISIT (OUTPATIENT)
Dept: INTERNAL MEDICINE CLINIC | Facility: CLINIC | Age: 47
End: 2022-12-27

## 2022-12-27 VITALS
SYSTOLIC BLOOD PRESSURE: 119 MMHG | HEIGHT: 62 IN | TEMPERATURE: 96 F | WEIGHT: 109.4 LBS | HEART RATE: 98 BPM | BODY MASS INDEX: 20.13 KG/M2 | OXYGEN SATURATION: 100 % | DIASTOLIC BLOOD PRESSURE: 72 MMHG

## 2022-12-27 DIAGNOSIS — R07.81 RIB PAIN: ICD-10-CM

## 2022-12-27 DIAGNOSIS — I27.20 PULMONARY HYPERTENSION (HCC): ICD-10-CM

## 2022-12-27 DIAGNOSIS — E10.22 TYPE 1 DIABETES MELLITUS WITH DIABETIC CHRONIC KIDNEY DISEASE, UNSPECIFIED CKD STAGE (HCC): ICD-10-CM

## 2022-12-27 DIAGNOSIS — E84.9 CYSTIC FIBROSIS (HCC): ICD-10-CM

## 2022-12-27 DIAGNOSIS — R05.3 CHRONIC COUGH: Primary | ICD-10-CM

## 2022-12-27 PROBLEM — Z93.4 JEJUNOSTOMY TUBE PRESENT (HCC): Status: RESOLVED | Noted: 2021-10-28 | Resolved: 2022-12-27

## 2022-12-27 PROBLEM — I51.89 RIGHT ATRIAL MASS: Status: ACTIVE | Noted: 2021-09-21

## 2022-12-27 PROBLEM — J06.9 URI, ACUTE: Status: RESOLVED | Noted: 2022-11-01 | Resolved: 2022-12-27

## 2022-12-27 PROBLEM — D64.9 ANEMIA: Status: RESOLVED | Noted: 2022-11-08 | Resolved: 2022-12-27

## 2022-12-27 PROBLEM — R91.8 PULMONARY NODULES: Status: ACTIVE | Noted: 2022-08-01

## 2022-12-27 PROBLEM — I51.89 RIGHT ATRIAL MASS: Status: RESOLVED | Noted: 2021-09-21 | Resolved: 2022-12-27

## 2022-12-27 RX ORDER — GUAIFENESIN AND CODEINE PHOSPHATE 100; 10 MG/5ML; MG/5ML
5 SOLUTION ORAL 3 TIMES DAILY PRN
Qty: 118 ML | Refills: 0 | Status: SHIPPED | OUTPATIENT
Start: 2022-12-27

## 2022-12-27 RX ORDER — ALPRAZOLAM 1 MG/1
1 TABLET ORAL
Qty: 30 TABLET | Refills: 0 | Status: SHIPPED | OUTPATIENT
Start: 2022-12-27

## 2022-12-27 RX ORDER — INSULIN GLARGINE 100 [IU]/ML
INJECTION, SOLUTION SUBCUTANEOUS
Qty: 15 ML | Refills: 3 | Status: SHIPPED | OUTPATIENT
Start: 2022-12-27

## 2022-12-27 RX ORDER — TACROLIMUS 0.5 MG/1
CAPSULE ORAL
COMMUNITY
Start: 2022-12-21

## 2022-12-27 RX ORDER — INSULIN GLARGINE-YFGN 100 [IU]/ML
INJECTION, SOLUTION SUBCUTANEOUS
COMMUNITY
Start: 2022-12-21

## 2022-12-27 RX ORDER — OXYCODONE HYDROCHLORIDE 10 MG/1
10 TABLET ORAL EVERY 8 HOURS PRN
Qty: 90 TABLET | Refills: 0 | Status: SHIPPED | OUTPATIENT
Start: 2022-12-27 | End: 2023-01-26

## 2022-12-27 RX ORDER — INSULIN ASPART 100 [IU]/ML
INJECTION, SOLUTION INTRAVENOUS; SUBCUTANEOUS
Qty: 15 ML | Refills: 3 | Status: SHIPPED | OUTPATIENT
Start: 2022-12-27

## 2022-12-28 NOTE — PROGRESS NOTES
Name: Trevor Whipple      : 1975      MRN: [id number]  Encounter Provider: Saud Aguirre MD  Encounter Date: 2022   Encounter department: [address] [place] [address]  Chronic cough  -     guaifenesin-codeine (GUAIFENESIN AC) 100-10 MG/5ML liquid; Take 5 mL by mouth 3 (three) times a day as needed for cough  -     oxyCODONE (ROXICODONE) 10 MG TABS; Take 1 tablet (10 mg total) by mouth every 8 (eight) hours as needed for severe pain Max Daily Amount: 30 mg    2  Cystic fibrosis (HCC)  -     ALPRAZolam (XANAX) 1 mg tablet; Take 1 tablet (1 mg total) by mouth daily at bedtime as needed for anxiety or sleep    3  Rib pain    4  Type 1 diabetes mellitus with diabetic chronic kidney disease, unspecified CKD stage (HCC)  -     insulin aspart (NovoLOG FlexPen) 100 UNIT/ML injection pen; Inject 4 units three times a day with meals  -     Insulin Glargine Solostar (Semglee) 100 UNIT/ML SOPN; Inject 5 units under the skin twice daily    5  Pulmonary hypertension (HCC)         Subjective      I had the pleasure of seeing Ms Danny Carter today for follow-up regarding chronic cough and sleep issues  Patient has been having a chronic cough, productive with some tan sputum  No fevers or chills  She has been using mucinex and OTC cough suppressant with mild relief  Cough sometimes keeping her up at night with difficulty sleeping  She did complete course of azithromycin recently which was prescribed by our office for acute bronchitis  She also noted that her blood glucose has been "all over the place recently"  She recently started using dexcom  She is giving 4u humalog TID with meals + sliding scale and lantus 5u daily at bedtime  Blood glucose readings at times throughout the day are greater than 400 according to her dexcom  No hypoglycemic episodes  Review of Systems   Constitutional: Positive for fatigue  Negative for fever     HENT: Negative for sore throat  Respiratory: Positive for cough  Negative for shortness of breath, wheezing and stridor  Cardiovascular: Positive for chest pain  Negative for palpitations and leg swelling  Current Outpatient Medications on File Prior to Visit   Medication Sig   • acetaminophen (TYLENOL) 325 mg tablet Take 3 tablets (975 mg total) by mouth every 8 (eight) hours   • albuterol (2 5 mg/3 mL) 0 083 % nebulizer solution Take 6 mL (5 mg total) by nebulization 2 (two) times a day   • atovaquone (MEPRON) 750 mg/5 mL suspension Take 1,500 mg by mouth daily   • BD Pen Needle Abi U/F 32G X 4 MM MISC 4 times daily   • Continuous Blood Gluc  (Dexcom G6 ) SUKHI Use 1 Units continuous   • Continuous Blood Gluc Sensor (Dexcom G6 Sensor) MISC Use 1 Units every 7 days   • Continuous Blood Gluc Transmit (Dexcom G6 Transmitter) MISC Use 1 Units continuous   • dexlansoprazole (DEXILANT) 60 MG capsule Take 1 capsule (60 mg total) by mouth in the morning     • dronabinol (MARINOL) 5 MG capsule TAKE 1 CAPSULE (5 MG TOTAL) BY MOUTH 3 (THREE) TIMES A DAY   • medroxyPROGESTERone (DEPO-PROVERA) 150 mg/mL injection Inject 1 mL (150 mg total) into a muscle every 3 (three) months   • naloxone (NARCAN) 4 mg/0 1 mL nasal spray 4 mg into each nostril   • pancrelipase, Lip-Prot-Amyl, (CREON) 24,000 units Take 48,000 Units by mouth 3 (three) times a day with meals   • predniSONE 10 mg tablet Take 10 mg by mouth daily   • pregabalin (LYRICA) 100 mg capsule TAKE 1 CAPSULE BY MOUTH TWICE A DAY   • Respiratory Therapy Supplies (Nebulizer) SUKHI Please dispense nebulizer machine   • Semglee, yfgn, 100 UNIT/ML SOPN    • tacrolimus (PROGRAF) 0 5 mg capsule    • tacrolimus (PROGRAF) 1 mg capsule Take 0 5 mg by mouth 2 (two) times a day   • valACYclovir (VALTREX) 500 mg tablet Take 500 mg by mouth daily   • apixaban (Eliquis) 5 mg Take 2 tablets (10 mg total) by mouth 2 (two) times a day for 7 days, THEN 1 tablet (5 mg total) 2 (two) times a day for 23 days  • Insulin Pen Needle (Comfort EZ Pen Needles) 33G X 4 MM MISC Use to inject insulin 4 times a day   • sodium chloride (OCEAN) 0 65 % nasal spray 2 sprays into each nostril 2 (two) times a day (Patient not taking: Reported on 11/9/2022)   • [DISCONTINUED] cyclobenzaprine (FLEXERIL) 5 mg tablet Take 1-2 tabs every 8 hours as needed for muscle pain/spasm (Patient not taking: No sig reported)   • [DISCONTINUED] famotidine (PEPCID) 40 MG tablet TAKE 1 TABLET BY MOUTH EVERYDAY AT BEDTIME (Patient not taking: No sig reported)   • [DISCONTINUED] sucralfate (CARAFATE) 1 g/10 mL suspension Take 10 mL (1 g total) by mouth 4 (four) times a day (with meals and at bedtime) (Patient not taking: Reported on 10/28/2022)   • [DISCONTINUED] traZODone (DESYREL) 50 mg tablet Take 0 5 tablets (25 mg total) by mouth daily at bedtime       Objective     /72 (BP Location: Right arm, Patient Position: Sitting, Cuff Size: Standard)   Pulse 98   Temp (!) 96 °F (35 6 °C) (Tympanic)   Ht 5' 2" (1 575 m)   Wt 49 6 kg (109 lb 6 4 oz)   SpO2 100%   BMI 20 01 kg/m²     Physical Exam  Constitutional:       General: She is not in acute distress  Appearance: She is not toxic-appearing  Cardiovascular:      Rate and Rhythm: Normal rate and regular rhythm  Pulmonary:      Effort: Pulmonary effort is normal  No respiratory distress  Breath sounds: No wheezing, rhonchi or rales  Comments: Coarse breath sounds bilaterally  Musculoskeletal:         General: Swelling (Facial) present  Skin:     General: Skin is warm and dry  Findings: Rash (cheeks) present  Neurological:      Mental Status: She is alert  Mental status is at baseline         Americo Mckenna MD

## 2023-01-04 DIAGNOSIS — Z12.83 SKIN CANCER SCREENING: Primary | ICD-10-CM

## 2023-01-05 ENCOUNTER — TELEPHONE (OUTPATIENT)
Dept: DERMATOLOGY | Facility: CLINIC | Age: 48
End: 2023-01-05

## 2023-01-06 ENCOUNTER — OFFICE VISIT (OUTPATIENT)
Dept: ENDOCRINOLOGY | Facility: CLINIC | Age: 48
End: 2023-01-06

## 2023-01-06 ENCOUNTER — CLINICAL SUPPORT (OUTPATIENT)
Dept: OBGYN CLINIC | Facility: MEDICAL CENTER | Age: 48
End: 2023-01-06

## 2023-01-06 VITALS
WEIGHT: 118 LBS | SYSTOLIC BLOOD PRESSURE: 132 MMHG | BODY MASS INDEX: 21.71 KG/M2 | HEIGHT: 62 IN | DIASTOLIC BLOOD PRESSURE: 80 MMHG

## 2023-01-06 VITALS
TEMPERATURE: 98 F | DIASTOLIC BLOOD PRESSURE: 98 MMHG | HEART RATE: 92 BPM | SYSTOLIC BLOOD PRESSURE: 148 MMHG | WEIGHT: 116 LBS | OXYGEN SATURATION: 99 % | BODY MASS INDEX: 21.35 KG/M2 | HEIGHT: 62 IN

## 2023-01-06 DIAGNOSIS — E84.8 DIABETES MELLITUS RELATED TO CF (CYSTIC FIBROSIS) (HCC): ICD-10-CM

## 2023-01-06 DIAGNOSIS — E08.9 DIABETES MELLITUS RELATED TO CF (CYSTIC FIBROSIS) (HCC): ICD-10-CM

## 2023-01-06 DIAGNOSIS — Z30.42 ENCOUNTER FOR MANAGEMENT AND INJECTION OF DEPO-PROVERA: Primary | ICD-10-CM

## 2023-01-06 DIAGNOSIS — N18.2 CKD (CHRONIC KIDNEY DISEASE) STAGE 2, GFR 60-89 ML/MIN: ICD-10-CM

## 2023-01-06 DIAGNOSIS — E10.22 TYPE 1 DIABETES MELLITUS WITH DIABETIC CHRONIC KIDNEY DISEASE, UNSPECIFIED CKD STAGE (HCC): Primary | ICD-10-CM

## 2023-01-06 RX ORDER — AZITHROMYCIN 250 MG/1
250 TABLET, FILM COATED ORAL 3 TIMES WEEKLY
COMMUNITY
Start: 2023-01-06

## 2023-01-06 RX ORDER — MEDROXYPROGESTERONE ACETATE 150 MG/ML
150 INJECTION, SUSPENSION INTRAMUSCULAR ONCE
Status: COMPLETED | OUTPATIENT
Start: 2023-01-06 | End: 2023-01-06

## 2023-01-06 RX ADMIN — MEDROXYPROGESTERONE ACETATE 150 MG: 150 INJECTION, SUSPENSION INTRAMUSCULAR at 12:27

## 2023-01-06 NOTE — PROGRESS NOTES
Depo    Last Depo: 10/19/22  Last Yearly: 4/23/21  Given Within Timeframe?: Yes  Serum HCG Indicated: No  LMP/Spotting/Bleeding on Depo: Occasional spotting  Injection Site Today: Rt  Deltoid  How did patient tolerate:  Well  Literature Given From Inside Box: Yes  Next Depo Time Frame: 12w from today  Yearly Scheduled?: Yes    Given By: Morelia Barcenas MA  Manufacture:[employer]

## 2023-01-06 NOTE — PATIENT INSTRUCTIONS
Humalog Insulin 5 5 6     Regular, Apidra, Humalog orNovolog Sliding Scale:   <80                      151-200 + 1 +1 +1     201-250 +2 +2 +2 +   251-300 +3 +3 +3 +   351-400 +4 +4 +4 +   >401 +5 +5 +5 +         Lantus Insulin       12u

## 2023-01-06 NOTE — PROGRESS NOTES
Follow-up Patient Progress Note      CC: Type 1 diabetes     History of Present Illness:   42 yr female with hx of cystic fibrosis diagnosed age 7 months s/p redo Lt lung transplant 3/2017 and follow up by [facility], recurrent PNA c pseudomonas and long term steroids, Type 1 diabetes, gastroparesis, pulmonary HTN, HTN, hx of DVT on apixaban, osteoporosis being managed by PCP, anemia, chronic pain and chronic glucocorticoid and immunosuppressive therapy  Last visit was 10/28/2022      She has been on insulin therapy for cystic fibrosis related diabetes for about 8 years associated with insulin deficiency as established at [address] [place] [address]      Since last visit, weight has been stable  She had generally adequate control until a few weeks ago when she started still having significant hyperglycemia  CGM data review[de-identified]  Device: dexcom Dates: 12/24/22 - 1/6/23 Usage: 64 %  Av glu: 251 mg/dL  SD: 98 mg/dL CV:   %  TIR: 25 % TAR: 30+44 %  TBR: 1  %    Glycemic patters:  Mostly fasting and postprandial hyperglycemia  Occasional postprandial hypoglycemia post correction    Hypoglycemia: No       Current meds:  Glargine 5u BID  Humalog 5u tidac plus correction     Opthamology: No  Podiatry: No  Vaccination:    Dental:  Pancreatitis: yes     Ace/ARB: No  Statin:No  Thyroid issues: No       Patient Active Problem List   Diagnosis   • S/P pneumonectomy   • Type 1 diabetes (HCC)   • Pancreatic insufficiency   • Asthma   • Chronic pain disorder   • DVT (deep venous thrombosis) (HCC)   • Acute on chronic anemia   • Long term current use of bisphosphonates   • Long term current use of systemic steroids   • Osteoporosis   • Anxiety   • CKD (chronic kidney disease) stage 2, GFR 60-89 ml/min   • Chronic sinusitis   • Colon polyp   • Esophageal reflux   • Hiatal hernia   • Immunosuppression (HCC)   • Severe protein-calorie malnutrition (HCC)   • Transplant rejection   • Cystic fibrosis ([facility] )   • Essential hypertension   • Hypercholesteremia   • Weakness of right lower extremity   • Moderate protein-calorie malnutrition (HCC)   • Neuropathic pain of right foot   • Ulcerative pancolitis without complication (HCC)   • Gastroparesis   • Abnormal uterine bleeding (AUB)   • Pulmonary hypertension (HCC)   • Renal calculus   • Pulmonary nodules   • Rib pain   • Chronic cough   • Right leg pain   • Pulmonary embolus (HCC)   • Acute bronchitis     Past Medical History:   Diagnosis Date   • ABPA (allergic bronchopulmonary aspergillosis) ([facility] ) 7/8/2012   • Acute kidney injury ([facility] ) 9/8/2017   • Acute on chronic respiratory failure ([facility] ) 3/4/2017    Last Assessment & Plan:  Formatting of this note might be different from the original  Recurrent with clinical worsening with enlarging PTX, s/p multiple chest tubes, but new fevers 6/19 and worsening leukocytosis indicating infection as cause of 6/19 decompensation with profound hypoxia in setting of shunting through right lung and continued pneumothorax s/p 2nd chest tube placement  6/27: Right   • Anaphylaxis 6/6/2022   • Asthma    • Chest pain 9/21/2021   • Chronic pain    • Chronic respiratory failure with hypoxia ([facility] ) 9/7/2021   • COVID-19 6/3/2022   • Cystic fibrosis ([place] [address] )    • Diabetes mellitus ([facility] )    • GERD (gastroesophageal reflux disease)    • HBP (high blood pressure)    • HCAP (healthcare-associated pneumonia) 3/6/2020   • Influenza B 2/23/2020   • Kidney stone    • Neutropenic fever ([facility] ) 9/26/2020   • Pneumonia due to Pseudomonas species ([place] [address] ) 10/28/2010    Last Assessment & Plan:  Formatting of this note might be different from the original  Polymicrobial pna w/ pseudomonas, M   Abscessus, Cadida glabrata/albicans   • PONV (postoperative nausea and vomiting)    • Pseudomonas aeruginosa infection 9/20/2012   • Transplant recipient     Lung- 2013, 2017   • Wellness examination 2/4/2013    Formatting of this note might be different from the original  Discussed with patient and acknowledgement form signed on 2/4/2013 (ALYSHA)      Past Surgical History:   Procedure Laterality Date   • APPENDECTOMY     • AUGMENTATION MAMMAPLASTY Bilateral 2004   • AUGMENTATION MAMMAPLASTY Right 2021    removed    • BREAST SURGERY      Aug     • BRONCHOSCOPY     • CHOLECYSTECTOMY     • COLONOSCOPY     • EGD  07/2019    Yeast Infect      • FL RETROGRADE PYELOGRAM  1/9/2020   • FL RETROGRADE PYELOGRAM  2/4/2020   • GASTROSTOMY TUBE, PLACE     • IR BALLOON-OCCLUDED ANTEGRADE TRANSVENOUS OBLITERATION (BATO)  11/22/2022   • IR PORT PLACEMENT  3/22/2022   • IR PORT REMOVAL  1/10/2022   • IR PORT STRIPPING  11/25/2022   • IR TUNNELED CENTRAL LINE PLACEMENT  1/13/2022   • LUNG SURGERY     • LUNG TRANSPLANT      X2   • CO CYSTO BLADDER W/URETERAL CATHETERIZATION Right 1/9/2020    Procedure: CYSTOSCOPY RETROGRADE PYELOGRAM WITH INSERTION STENT URETERAL;  Surgeon: Nilay Cyr MD;  Location: [facility];  Service: Urology   • CO CYSTO/URETERO W/LITHOTRIPSY &INDWELL STENT INSRT Right 2/4/2020    Procedure: CYSTOSCOPY URETEROSCOPY WITH LITHOTRIPSY HOLMIUM LASER, RETROGRADE PYELOGRAM AND INSERTION STENT URETERAL;  Surgeon: Nilay Cyr MD;  Location: [facility];  Service: Urology   • US GUIDED VASCULAR ACCESS  1/17/2017      Family History   Problem Relation Age of Onset   • Alcohol abuse Mother    • Mental illness Mother    • Alcohol abuse Father    • COPD Maternal Grandmother    • Lung cancer Maternal Grandmother 72   • COPD Maternal Grandfather    • COPD Paternal Grandmother    • Lung cancer Paternal Grandmother 72   • No Known Problems Sister    • No Known Problems Paternal Aunt    • Breast cancer Neg Hx    • Ovarian cancer Neg Hx    • Colon cancer Neg Hx      Social History     Tobacco Use   • Smoking status: Former     Packs/day: 0 50     Years: 12 00     Pack years: 6 00     Types: Cigarettes   • Smokeless tobacco: Never   Substance Use Topics   • Alcohol use: Not Currently     Comment: social     Allergies   Allergen Reactions   • Bebtelovimab Anaphylaxis   • Vancomycin Angioedema and Hives   • Gabapentin Hallucinations   • Ceftazidime Headache     Severe headaches after desensitization   • Nsaids Other (See Comments)     Lung transplant increases risk of renal toxicity, call lung txp provider to discuss if needed         Current Outpatient Medications:   •  acetaminophen (TYLENOL) 325 mg tablet, Take 3 tablets (975 mg total) by mouth every 8 (eight) hours, Disp: , Rfl: 0  •  albuterol (2 5 mg/3 mL) 0 083 % nebulizer solution, Take 6 mL (5 mg total) by nebulization 2 (two) times a day, Disp: , Rfl: 0  •  ALPRAZolam (XANAX) 1 mg tablet, Take 1 tablet (1 mg total) by mouth daily at bedtime as needed for anxiety or sleep, Disp: 30 tablet, Rfl: 0  •  apixaban (Eliquis) 5 mg, Take 2 tablets (10 mg total) by mouth 2 (two) times a day for 7 days, THEN 1 tablet (5 mg total) 2 (two) times a day for 23 days  , Disp: 74 tablet, Rfl: 0  •  atovaquone (MEPRON) 750 mg/5 mL suspension, Take 1,500 mg by mouth daily, Disp: , Rfl:   •  azithromycin (ZITHROMAX) 250 mg tablet, Take 250 mg by mouth 3 (three) times a week, Disp: , Rfl:   •  BD Pen Needle Abi U/F 32G X 4 MM MISC, 4 times daily, Disp: 400 each, Rfl: 1  •  Continuous Blood Gluc  (Dexcom G6 ) SUKHI, Use 1 Units continuous, Disp: 1 each, Rfl: 0  •  Continuous Blood Gluc Sensor (Dexcom G6 Sensor) MISC, Use 1 Units every 7 days, Disp: 12 each, Rfl: 3  •  Continuous Blood Gluc Transmit (Dexcom G6 Transmitter) MISC, Use 1 Units continuous, Disp: 3 each, Rfl: 0  •  dexlansoprazole (DEXILANT) 60 MG capsule, Take 1 capsule (60 mg total) by mouth in the morning , Disp: 30 capsule, Rfl: 0  •  dronabinol (MARINOL) 5 MG capsule, TAKE 1 CAPSULE (5 MG TOTAL) BY MOUTH 3 (THREE) TIMES A DAY, Disp: 90 capsule, Rfl: 3  •  guaifenesin-codeine (GUAIFENESIN AC) 100-10 MG/5ML liquid, Take 5 mL by mouth 3 (three) times a day as needed for cough, Disp: 118 mL, Rfl: 0  •  insulin aspart (NovoLOG FlexPen) 100 UNIT/ML injection pen, Inject 4 units three times a day with meals, Disp: 15 mL, Rfl: 3  •  Insulin Glargine Solostar (Semglee) 100 UNIT/ML SOPN, Inject 5 units under the skin twice daily, Disp: 15 mL, Rfl: 3  •  Insulin Pen Needle (Comfort EZ Pen Needles) 33G X 4 MM MISC, Use to inject insulin 4 times a day, Disp: 100 each, Rfl: 3  •  medroxyPROGESTERone (DEPO-PROVERA) 150 mg/mL injection, Inject 1 mL (150 mg total) into a muscle every 3 (three) months, Disp: 1 mL, Rfl: 3  •  naloxone (NARCAN) 4 mg/0 1 mL nasal spray, 4 mg into each nostril, Disp: , Rfl:   •  oxyCODONE (ROXICODONE) 10 MG TABS, Take 1 tablet (10 mg total) by mouth every 8 (eight) hours as needed for severe pain Max Daily Amount: 30 mg, Disp: 90 tablet, Rfl: 0  •  pancrelipase, Lip-Prot-Amyl, (CREON) 24,000 units, Take 48,000 Units by mouth 3 (three) times a day with meals, Disp: , Rfl:   •  predniSONE 10 mg tablet, Take 10 mg by mouth daily, Disp: , Rfl:   •  pregabalin (LYRICA) 100 mg capsule, TAKE 1 CAPSULE BY MOUTH TWICE A DAY, Disp: 60 capsule, Rfl: 0  •  Respiratory Therapy Supplies (Nebulizer) SUKHI, Please dispense nebulizer machine, Disp: , Rfl:   •  Semglee, yfgn, 100 UNIT/ML SOPN, , Disp: , Rfl:   •  sodium chloride (OCEAN) 0 65 % nasal spray, 2 sprays into each nostril 2 (two) times a day, Disp: 30 mL, Rfl: 1  •  tacrolimus (PROGRAF) 0 5 mg capsule, , Disp: , Rfl:   •  tacrolimus (PROGRAF) 1 mg capsule, Take 0 5 mg by mouth 2 (two) times a day, Disp: , Rfl:   •  valACYclovir (VALTREX) 500 mg tablet, Take 500 mg by mouth daily, Disp: , Rfl:   No current facility-administered medications for this visit  Review of Systems   Constitutional: Positive for fatigue  HENT: Negative  Eyes: Negative  Respiratory: Negative  Cardiovascular: Negative  Gastrointestinal: Negative  Endocrine: Negative  Musculoskeletal: Negative  Skin: Negative  Allergic/Immunologic: Negative  Neurological: Negative      Hematological: Negative  Psychiatric/Behavioral: Negative  Physical Exam:  Body mass index is 21 05 kg/m²  /98 (BP Location: Left arm, Patient Position: Sitting, Cuff Size: Standard)   Pulse 92   Temp 98 °F (36 7 °C) (Tympanic)   Ht 5' 2 25" (1 581 m)   Wt 52 6 kg (116 lb)   SpO2 99%   BMI 21 05 kg/m²    Vitals:    01/06/23 1234   Weight: 52 6 kg (116 lb)        Physical Exam  Constitutional:       Appearance: She is well-developed  HENT:      Head: Normocephalic  Eyes:      Pupils: Pupils are equal, round, and reactive to light  Neck:      Thyroid: No thyromegaly  Cardiovascular:      Rate and Rhythm: Normal rate  Heart sounds: Normal heart sounds  Pulmonary:      Effort: Pulmonary effort is normal       Breath sounds: Normal breath sounds  Abdominal:      General: Bowel sounds are normal       Palpations: Abdomen is soft  Musculoskeletal:         General: No deformity  Cervical back: Normal range of motion  Skin:     Capillary Refill: Capillary refill takes less than 2 seconds  Coloration: Skin is not pale  Findings: No rash  Neurological:      Mental Status: She is alert and oriented to person, place, and time  Labs:   Lab Results   Component Value Date    HGBA1C 7 3 (H) 11/10/2022       No results found for: [id number], TSH, F2NYAZX, N8CSPRD, THYROIDAB    Lab Results   Component Value Date    CREATININE 1 00 11/09/2022    CREATININE 1 17 11/08/2022    CREATININE 1 18 09/13/2022    BUN 15 11/09/2022    K 4 5 11/09/2022     11/09/2022    CO2 21 11/09/2022     eGFR   Date Value Ref Range Status   11/09/2022 68 ml/min/1 73sq m Final       Lab Results   Component Value Date    ALT 18 11/08/2022    AST 32 11/08/2022    ALKPHOS 145 (H) 11/08/2022       No results found for: CHOLESTEROL  No results found for: HDL  No results found for: TRIG  No results found for: NONHDLC      Impression:  1   Type 1 diabetes mellitus with diabetic chronic kidney disease, unspecified CKD stage ([facility] )    2  CKD (chronic kidney disease) stage 2, GFR 60-89 ml/min    3  Diabetes mellitus related to CF (cystic fibrosis) ([facility] )         Plan:    Diagnoses and all orders for this visit:    Type 1 diabetes mellitus with diabetic chronic kidney disease, unspecified CKD stage ([facility] )  Her diabetes is complicated and associated with cystic fibrosis  She is uncontrolled with time in range 25% and time above range 75% on AGP for the last 2 weeks  She mostly has postprandial hyperglycemia and occasional postprandial hypoglycemia post correction  Glycemic control is abnormal partly due to lack of accurate carb counting  We discussed options and I suggested to start including carb counting into each meal while sticking to the insulin regimen as listed below  We increased both basal and prandial insulin today  Will introduce flexible insulin therapy once she is robustly counting carbohydrates  After that, may then consider education for pump  Today we discussed aspects of flexible insulin therapy  Suggested follow-up with diabetes education for basic carb counting and then upgraded education  Advised to send Dexcom data as needed  Follow-up in 6 weeks  -     Hemoglobin A1C; Future  -     Microalbumin / creatinine urine ratio; Future    Humalog Insulin 5 5 6     Regular, Apidra, Humalog orNovolog Sliding Scale:   <80                      151-200 + 1 +1 +1     201-250 +2 +2 +2 +   251-300 +3 +3 +3 +   351-400 +4 +4 +4 +   >401 +5 +5 +5 +         Lantus Insulin       12u       CKD (chronic kidney disease) stage 2, GFR 60-89 ml/min        I have spent 35 minutes with patient today in which greater than 50% of this time was spent in counseling/coordination of care  Discussed with the patient and all questioned fully answered  She will call me if any problems arise      Educated/ Counseled patient on diagnostic test results, prognosis, risk vs benefit of treatment options, importance of treatment compliance, healthy life and lifestyle choices        [address]

## 2023-01-12 ENCOUNTER — TELEPHONE (OUTPATIENT)
Dept: GASTROENTEROLOGY | Facility: CLINIC | Age: 48
End: 2023-01-12

## 2023-01-12 ENCOUNTER — CONSULT (OUTPATIENT)
Dept: DERMATOLOGY | Facility: CLINIC | Age: 48
End: 2023-01-12

## 2023-01-12 ENCOUNTER — PREP FOR PROCEDURE (OUTPATIENT)
Dept: GASTROENTEROLOGY | Facility: CLINIC | Age: 48
End: 2023-01-12

## 2023-01-12 DIAGNOSIS — K21.00 GASTROESOPHAGEAL REFLUX DISEASE WITH ESOPHAGITIS WITHOUT HEMORRHAGE: Primary | ICD-10-CM

## 2023-01-12 DIAGNOSIS — K31.84 GASTROPARESIS DUE TO DM (HCC): ICD-10-CM

## 2023-01-12 DIAGNOSIS — D22.9 MULTIPLE NEVI: Primary | ICD-10-CM

## 2023-01-12 DIAGNOSIS — E11.43 GASTROPARESIS DUE TO DM (HCC): ICD-10-CM

## 2023-01-12 DIAGNOSIS — Z12.83 SKIN CANCER SCREENING: ICD-10-CM

## 2023-01-12 DIAGNOSIS — D48.5 NEOPLASM OF UNCERTAIN BEHAVIOR OF SKIN: ICD-10-CM

## 2023-01-12 DIAGNOSIS — L81.4 LENTIGO: ICD-10-CM

## 2023-01-12 NOTE — PROGRESS NOTES
[facility] Dermatology Clinic Note     Patient Name: Traci Trejo  Encounter Date: 01/12/2023     Have you been cared for by a [facility] Dermatologist in the last 3 years and, if so, which description applies to you? NO  I am considered a "new" patient and must complete all patient intake questions  I am FEMALE/of child-bearing potential     REVIEW OF SYSTEMS:  Have you recently had or currently have any of the following? · Recent fever or chills? No  · Any non-healing wound? No  · Are you pregnant or planning to become pregnant? No  · Are you currently or planning to be nursing or breast feeding? No   PAST MEDICAL HISTORY:  Have you personally ever had or currently have any of the following? If "YES," then please provide more detail  · Skin cancer (such as Melanoma, Basal Cell Carcinoma, Squamous Cell Carcinoma? No  · Tuberculosis, HIV/AIDS, Hepatitis B or C: No  · Systemic Immunosuppression such as Diabetes, Biologic or Immunotherapy, Chemotherapy, Organ Transplantation, Bone Marrow Transplantation YES, lung transplant   · Radiation Treatment No   FAMILY HISTORY:  Any "first degree relatives" (parent, brother, sister, or child) with the following? • Skin Cancer, Pancreatic or Other Cancer? No   • Lung cancer:maternal grandparents    PATIENT EXPERIENCE:    • Do you want the Dermatologist to perform a COMPLETE skin exam today including a clinical examination under the "bra and underwear" areas? Yes  • If necessary, do we have your permission to call and leave a detailed message on your Preferred Phone number that includes your specific medical information?   Yes      Allergies   Allergen Reactions   • Bebtelovimab Anaphylaxis   • Vancomycin Angioedema and Hives   • Gabapentin Hallucinations   • Ceftazidime Headache     Severe headaches after desensitization   • Nsaids Other (See Comments)     Lung transplant increases risk of renal toxicity, call lung txp provider to discuss if needed Current Outpatient Medications:   •  acetaminophen (TYLENOL) 325 mg tablet, Take 3 tablets (975 mg total) by mouth every 8 (eight) hours, Disp: , Rfl: 0  •  albuterol (2 5 mg/3 mL) 0 083 % nebulizer solution, Take 6 mL (5 mg total) by nebulization 2 (two) times a day, Disp: , Rfl: 0  •  ALPRAZolam (XANAX) 1 mg tablet, Take 1 tablet (1 mg total) by mouth daily at bedtime as needed for anxiety or sleep, Disp: 30 tablet, Rfl: 0  •  apixaban (Eliquis) 5 mg, Take 2 tablets (10 mg total) by mouth 2 (two) times a day for 7 days, THEN 1 tablet (5 mg total) 2 (two) times a day for 23 days  , Disp: 74 tablet, Rfl: 0  •  atovaquone (MEPRON) 750 mg/5 mL suspension, Take 1,500 mg by mouth daily, Disp: , Rfl:   •  azithromycin (ZITHROMAX) 250 mg tablet, Take 250 mg by mouth 3 (three) times a week, Disp: , Rfl:   •  BD Pen Needle Abi U/F 32G X 4 MM MISC, 4 times daily, Disp: 400 each, Rfl: 1  •  Continuous Blood Gluc  (Dexcom G6 ) SUKHI, Use 1 Units continuous, Disp: 1 each, Rfl: 0  •  Continuous Blood Gluc Sensor (Dexcom G6 Sensor) MISC, Use 1 Units every 7 days, Disp: 12 each, Rfl: 3  •  Continuous Blood Gluc Transmit (Dexcom G6 Transmitter) MISC, Use 1 Units continuous, Disp: 3 each, Rfl: 0  •  dexlansoprazole (DEXILANT) 60 MG capsule, Take 1 capsule (60 mg total) by mouth in the morning , Disp: 30 capsule, Rfl: 0  •  dronabinol (MARINOL) 5 MG capsule, TAKE 1 CAPSULE (5 MG TOTAL) BY MOUTH 3 (THREE) TIMES A DAY, Disp: 90 capsule, Rfl: 3  •  guaifenesin-codeine (GUAIFENESIN AC) 100-10 MG/5ML liquid, Take 5 mL by mouth 3 (three) times a day as needed for cough, Disp: 118 mL, Rfl: 0  •  insulin aspart (NovoLOG FlexPen) 100 UNIT/ML injection pen, Inject 4 units three times a day with meals, Disp: 15 mL, Rfl: 3  •  Insulin Glargine Solostar (Semglee) 100 UNIT/ML SOPN, Inject 5 units under the skin twice daily, Disp: 15 mL, Rfl: 3  •  Insulin Pen Needle (Comfort EZ Pen Needles) 33G X 4 MM MISC, Use to inject insulin 4 times a day, Disp: 100 each, Rfl: 3  •  medroxyPROGESTERone (DEPO-PROVERA) 150 mg/mL injection, Inject 1 mL (150 mg total) into a muscle every 3 (three) months, Disp: 1 mL, Rfl: 3  •  naloxone (NARCAN) 4 mg/0 1 mL nasal spray, 4 mg into each nostril, Disp: , Rfl:   •  oxyCODONE (ROXICODONE) 10 MG TABS, Take 1 tablet (10 mg total) by mouth every 8 (eight) hours as needed for severe pain Max Daily Amount: 30 mg, Disp: 90 tablet, Rfl: 0  •  pancrelipase, Lip-Prot-Amyl, (CREON) 24,000 units, Take 48,000 Units by mouth 3 (three) times a day with meals, Disp: , Rfl:   •  predniSONE 10 mg tablet, Take 10 mg by mouth daily, Disp: , Rfl:   •  pregabalin (LYRICA) 100 mg capsule, TAKE 1 CAPSULE BY MOUTH TWICE A DAY, Disp: 60 capsule, Rfl: 0  •  Respiratory Therapy Supplies (Nebulizer) SUKHI, Please dispense nebulizer machine, Disp: , Rfl:   •  Semglee, yfgn, 100 UNIT/ML SOPN, , Disp: , Rfl:   •  sodium chloride (OCEAN) 0 65 % nasal spray, 2 sprays into each nostril 2 (two) times a day, Disp: 30 mL, Rfl: 1  •  tacrolimus (PROGRAF) 0 5 mg capsule, , Disp: , Rfl:   •  tacrolimus (PROGRAF) 1 mg capsule, Take 0 5 mg by mouth 2 (two) times a day, Disp: , Rfl:   •  valACYclovir (VALTREX) 500 mg tablet, Take 500 mg by mouth daily, Disp: , Rfl:           • Whom besides the patient is providing clinical information about today's encounter?   o NO ADDITIONAL HISTORIAN (patient alone provided history)    Physical Exam and Assessment/Plan by Diagnosis:    MELANOCYTIC NEVI ("Moles")    Physical Exam:  • Anatomic Location Affected:   Mostly on sun-exposed areas of the trunk and extremities  • Morphological Description:  Scattered, 1-4mm round to ovoid, symmetric and evenly bordered, regularly pigmented macules/papules without outliers other than if noted elsewhere in today's note  Patient has a 4 mm mole on her right medial plantar foot and a 4 mm mole on her left heel   • Pertinent Positives:  • Pertinent Negatives:     Additional History of Present Condition:  Patient with history of double lung transplant for CF, has been on immunosuppression since 2013 (tacrolimus and prednisone currently)  No skin check x 2 years    Assessment and Plan:  Based on a thorough discussion of this condition and the management approach to it (including a comprehensive discussion of the known risks, side effects and potential benefits of treatment), the patient (family) agrees to implement the following specific plan:  • The patient was encouraged to use an SPF30+ broad spectrum sunscreen daily and re-apply every 2-3 outdoors while outside  The importance of sun protection, self-skin exams, and sun avoidance was emphasized  An annual full body skin exam is recommended, and the patient was encouraged to return to the office sooner for any new or changing lesions of concerns  • Benign, reassured  • Annual skin check     Melanocytic Nevi  Melanocytic nevi ("moles") are tan or brown, raised or flat areas of the skin which have an increased number of melanocytes  Melanocytes are the cells in our body which make pigment and account for skin color  Some moles are present at birth (I e , "congenital nevi"), while others come up later in life (i e , "acquired nevi")  The sun can stimulate the body to make more moles  Sunburns are not the only thing that triggers more moles  Chronic sun exposure can do it too  Clinically distinguishing a healthy mole from melanoma may be difficult, even for experienced dermatologists  The "ABCDE's" of moles have been suggested as a means of helping to alert a person to a suspicious mole and the possible increased risk of melanoma  The suggestions for raising alert are as follows:    Asymmetry: Healthy moles tend to be symmetric, while melanomas are often asymmetric  Asymmetry means if you draw a line through the mole, the two halves do not match in color, size, shape, or surface texture   Asymmetry can be a result of rapid enlargement of a mole, the development of a raised area on a previously flat lesion, scaling, ulceration, bleeding or scabbing within the mole  Any mole that starts to demonstrate "asymmetry" should be examined promptly by a board certified dermatologist      Border: Healthy moles tend to have discrete, even borders  The border of a melanoma often blends into the normal skin and does not sharply delineate the mole from normal skin  Any mole that starts to demonstrate "uneven borders" should be examined promptly by a board certified dermatologist      Color: Healthy moles tend to be one color throughout  Melanomas tend to be made up of different colors ranging from dark black, blue, white, or red  Any mole that demonstrates a color change should be examined promptly by a board certified dermatologist      Diameter: Healthy moles tend to be smaller than 0 6 cm in size; an exception are "congenital nevi" that can be larger  Melanomas tend to grow and can often be greater than 0 6 cm (1/4 of an inch, or the size of a pencil eraser)  This is only a guideline, and many normal moles may be larger than 0 6 cm without being unhealthy  Any mole that starts to change in size (small to bigger or bigger to smaller) should be examined promptly by a board certified dermatologist      Evolving: Healthy moles tend to "stay the same "  Melanomas may often show signs of change or evolution such as a change in size, shape, color, or elevation  Any mole that starts to itch, bleed, crust, burn, hurt, or ulcerate or demonstrate a change or evolution should be examined promptly by a board certified dermatologist         LENTIGO    Physical Exam:  • Anatomic Location Affected:  Face, neck, chest   • Morphological Description:  Light brown well demarcated macules on sun exposed skin with reassuring dermoscopy  • Pertinent Positives:  • Pertinent Negatives:     Additional History of Present Condition:      Assessment and Plan:  Based on a thorough discussion of this condition and the management approach to it (including a comprehensive discussion of the known risks, side effects and potential benefits of treatment), the patient (family) agrees to implement the following specific plan:  • When outside we recommend using a wide brim hat, sunglasses, long sleeve and pants, sunscreen with SPF 64+ with reapplication every 2 hours, or SPF specific clothing       NEOPLASM OF UNCERTAIN BEHAVIOR OF SKIN    Physical Exam:  • (Anatomic Location); (Size and Morphological Description); (Differential Diagnosis):  o Specimen A: Right inner forearm; 0 4 cm red sclay papule; DDx: porokeratosis vs lichenoid keratosis, rule out squamous atypia   o Specimen B: Left upper thigh;skin; shave biopsy 0 5 cm red scaly papule; DDx: porokeratosis vs lichenoid keratosis, rule out squamous atypia  • Pertinent Positives:  • Pertinent Negatives: Additional History of Present Condition:  Patient states specimen A has been present for about a year and has not gone away, she notes it has changed in size  Specimen B noted a few weeks ago  Assessment and Plan:  • I have discussed with the patient that a sample of skin via a "skin biopsy” would be potentially helpful to further make a specific diagnosis under the microscope  • Based on a thorough discussion of this condition and the management approach to it (including a comprehensive discussion of the known risks, side effects and potential benefits of treatment), the patient (family) agrees to implement the following specific plan:    o Procedure:  Skin Biopsy  After a thorough discussion of treatment options and risk/benefits/alternatives (including but not limited to local pain, scarring, dyspigmentation, blistering, possible superinfection, and inability to confirm a diagnosis via histopathology), verbal and written consent were obtained and portion of the rash was biopsied for tissue sample    See below for consent that was obtained from patient and subsequent Procedure Note  PROCEDURE TANGENTIAL (SHAVE) BIOPSY NOTE:A    • Performing Physician: Richie Reno  • Anatomic Location; Clinical Description with size (cm); Pre-Op Diagnosis:   o Right inner forearm; 0 4 cm red sclay papule; DDx: porokeratosis vs lichenoid keratosis, rule out squamous atypia   • Post-op diagnosis: Same     • Local anesthesia: 1% xylocaine with epi      • Topical anesthesia: None    • Hemostasis: Aluminum chloride   PROCEDURE TANGENTIAL (SHAVE) BIOPSY NOTE:B    • Performing Physician: Dr Vargas  • Anatomic Location; Clinical Description with size (cm); Pre-Op Diagnosis:   o Specimen B:Left upper thigh;skin; shave biopsy 0 5 cm red scaly papule; DDx: porokeratosis vs lichenoid keratosis, rule out squamous atypia  • Post-op diagnosis: Same     • Local anesthesia: 1% xylocaine with epi      • Topical anesthesia: None    • Hemostasis: Aluminum chloride     After obtaining informed consent  at which time there was a discussion about the purpose of biopsy  and low risks of infection and bleeding  The area was prepped and draped in the usual fashion  Anesthesia was obtained with 1% lidocaine with epinephrine  A shave biopsy to an appropriate sampling depth was obtained by Shave (Dermablade or 15 blade) The resulting wound was covered with surgical ointment and bandaged appropriately  The patient tolerated the procedure well without complications and was without signs of functional compromise  Specimen has been sent for review by Dermatopathology  Standard post-procedure care has been explained and has been included in written form within the patient's copy of Informed Consent  INFORMED CONSENT DISCUSSION AND POST-OPERATIVE INSTRUCTIONS FOR PATIENT    I   RATIONALE FOR PROCEDURE  I understand that a skin biopsy allows the Dermatologist to test a lesion or rash under the microscope to obtain a diagnosis    It usually involves numbing the area with numbing medication and removing a small piece of skin; sometimes the area will be closed with sutures  In this specific procedure, sutures are not usually needed  If any sutures are placed, then they are usually need to be removed in 2 weeks or less  I understand that my Dermatologist recommends that a skin "shave" biopsy be performed today  A local anesthetic, similar to the kind that a dentist uses when filling a cavity, will be injected with a very small needle into the skin area to be sampled  The injected skin and tissue underneath "will go to sleep” and become numb so no pain should be felt afterwards  An instrument shaped like a tiny "razor blade" (shave biopsy instrument) will be used to cut a small piece of tissue and skin from the area so that a sample of tissue can be taken and examined more closely under the microscope  A slight amount of bleeding will occur, but it will be stopped with direct pressure and a pressure bandage and any other appropriate methods  I understands that a scar will form where the wound was created  Surgical ointment will be applied to help protect the wound  Sutures are not usually needed  II   RISKS AND POTENTIAL COMPLICATIONS   I understand the risks and potential complications of a skin biopsy include but are not limited to the following:  • Bleeding  • Infection  • Pain  • Scar/keloid  • Skin discoloration  • Incomplete Removal  • Recurrence  • Nerve Damage/Numbness/Loss of Function  • Allergic Reaction to Anesthesia  • Biopsies are diagnostic procedures and based on findings additional treatment or evaluation may be required  • Loss or destruction of specimen resulting in no additional findings    My Dermatologist has explained to me the nature of the condition, the nature of the procedure, and the benefits to be reasonably expected compared with alternative approaches    My Dermatologist has discussed the likelihood of major risks or complications of this procedure including the specific risks listed above, such as bleeding, infection, and scarring/keloid  I understand that a scar is expected after this procedure  I understand that my physician cannot predict if the scar will form a "keloid," which extends beyond the borders of the wound that is created  A keloid is a thick, painful, and bumpy scar  A keloid can be difficult to treat, as it does not always respond well to therapy, which includes injecting cortisone directly into the keloid every few weeks  While this usually reduces the pain and size of the scar, it does not eliminate it  I understand that photographs may be taken before and after the procedure  These will be maintained as part of the medical providers confidential records and may not be made available to me  I further authorize the medical provider to use the photographs for teaching purposes or to illustrate scientific papers, books, or lectures if in his/her judgment, medical research, education, or science may benefit from its use  I have had an opportunity to fully inquire about the risks and benefits of this procedure and its alternatives  I have been given ample time and opportunity to ask questions and to seek a second opinion if I wished to do so  I acknowledge that there have specifically been no guarantees as to the cosmetic results from the procedure  I am aware that with any procedure there is always the possibility of an unexpected complication  III  POST-PROCEDURAL CARE (WHAT YOU WILL NEED TO DO "AFTER THE BIOPSY" TO OPTIMIZE HEALING)    • Keep the area clean and dry  Try NOT to remove the bandage or get it wet for the first 24 hours  • Gently clean the area and apply surgical ointment (such as Vaseline petrolatum ointment, which is available "over the counter" and not a prescription) to the biopsy site for up to 2 weeks straight  This acts to protect the wound from the outside world        • Sutures are not usually placed in this procedure  If any sutures were placed, return for suture removal as instructed (generally 1 week for the face, 2 weeks for the body)  • Take Acetaminophen (Tylenol) for discomfort, if no contraindications  Ibuprofen or aspirin could make bleeding worse  • Call our office immediately for signs of infection: fever, chills, increased redness, warmth, tenderness, discomfort/pain, or pus or foul smell coming from the wound  WHAT TO DO IF THERE IS ANY BLEEDING? If a small amount of bleeding is noticed, place a clean cloth over the area and apply firm pressure for ten minutes  Check the wound after 10 minutes of direct pressure  If bleeding persists, try one more time for an additional 10 minutes of direct pressure on the area  If the bleeding becomes heavier or does not stop after the second attempt, or if you have any other questions about this procedure, then please call your [facility]'s Dermatologist by calling [phone] (SKIN)  I hereby acknowledge that I have reviewed and verified the site with my Dermatologist and have requested and authorized my Dermatologist to proceed with the procedure      Scribe Attestation    I,:  Kvng Rose am acting as a scribe while in the presence of the attending physician :       I,:  Janessa Watts MD personally performed the services described in this documentation    as scribed in my presence :         Macrina Carr MD  Dermatology, PGY-2

## 2023-01-12 NOTE — PATIENT INSTRUCTIONS
Based on a thorough discussion of this condition and the management approach to it (including a comprehensive discussion of the known risks, side effects and potential benefits of treatment), the patient (family) agrees to implement the following specific plan:  The patient was encouraged to use an SPF30+ broad spectrum sunscreen daily and re-apply every 2-3 outdoors while outside  The importance of sun protection, self-skin exams, and sun avoidance was emphasized  An annual full body skin exam is recommended, and the patient was encouraged to return to the office sooner for any new or changing lesions of concerns  Benign, reassured  Annual skin check     Melanocytic Nevi  Melanocytic nevi ("moles") are tan or brown, raised or flat areas of the skin which have an increased number of melanocytes  Melanocytes are the cells in our body which make pigment and account for skin color  Some moles are present at birth (I e , "congenital nevi"), while others come up later in life (i e , "acquired nevi")  The sun can stimulate the body to make more moles  Sunburns are not the only thing that triggers more moles  Chronic sun exposure can do it too  Clinically distinguishing a healthy mole from melanoma may be difficult, even for experienced dermatologists  The "ABCDE's" of moles have been suggested as a means of helping to alert a person to a suspicious mole and the possible increased risk of melanoma  The suggestions for raising alert are as follows:    Asymmetry: Healthy moles tend to be symmetric, while melanomas are often asymmetric  Asymmetry means if you draw a line through the mole, the two halves do not match in color, size, shape, or surface texture  Asymmetry can be a result of rapid enlargement of a mole, the development of a raised area on a previously flat lesion, scaling, ulceration, bleeding or scabbing within the mole    Any mole that starts to demonstrate "asymmetry" should be examined promptly by a board certified dermatologist      Border: Healthy moles tend to have discrete, even borders  The border of a melanoma often blends into the normal skin and does not sharply delineate the mole from normal skin  Any mole that starts to demonstrate "uneven borders" should be examined promptly by a board certified dermatologist      Color: Healthy moles tend to be one color throughout  Melanomas tend to be made up of different colors ranging from dark black, blue, white, or red  Any mole that demonstrates a color change should be examined promptly by a board certified dermatologist      Diameter: Healthy moles tend to be smaller than 0 6 cm in size; an exception are "congenital nevi" that can be larger  Melanomas tend to grow and can often be greater than 0 6 cm (1/4 of an inch, or the size of a pencil eraser)  This is only a guideline, and many normal moles may be larger than 0 6 cm without being unhealthy  Any mole that starts to change in size (small to bigger or bigger to smaller) should be examined promptly by a board certified dermatologist      Evolving: Healthy moles tend to "stay the same "  Melanomas may often show signs of change or evolution such as a change in size, shape, color, or elevation  Any mole that starts to itch, bleed, crust, burn, hurt, or ulcerate or demonstrate a change or evolution should be examined promptly by a board certified dermatologist       Based on a thorough discussion of this condition and the management approach to it (including a comprehensive discussion of the known risks, side effects and potential benefits of treatment), the patient (family) agrees to implement the following specific plan:  When outside we recommend using a wide brim hat, sunglasses, long sleeve and pants, sunscreen with SPF 15+ with reapplication every 2 hours, or SPF specific clothing       What is a lentigo? A lentigo is a pigmented flat or slightly raised lesion with a clearly defined edge  Unlike an ephelis (freckle), it does not fade in the winter months  There are several kinds of lentigo  The name lentigo originally referred to its appearance resembling a small lentil  The plural of lentigo is lentigines, although “lentigos” is also in common use  Who gets lentigines? Lentigines can affect males and females of all ages and races  Solar lentigines are especially prevalent in fair skinned adults  Lentigines associated with syndromes are present at birth or arise during childhood  What causes lentigines? Common forms of lentigo are due to exposure to ultraviolet radiation:  Sun damage including sunburn   Indoor tanning   Phototherapy, especially photochemotherapy (PUVA)    Ionizing radiation, eg radiation therapy, can also cause lentigines  Several familial syndromes associated with widespread lentigines originate from mutations in Rogelio-MAP kinase, mTOR signaling and PTEN pathways  What is the treatment for lentigines? Most lentigines are left alone  Attempts to lighten them may not be successful  The following approaches are used:  SPF 50+ broad-spectrum sunscreen   Hydroquinone bleaching cream   Alpha hydroxy acids   Vitamin C   Retinoids   Azelaic acid   Chemical peels  Individual lesions can be permanently removed using:  Cryotherapy   Intense pulsed light   Pigment lasers    How can lentigines be prevented? Lentigines associated with exposure ultraviolet radiation can be prevented by very careful sun protection  Clothing is more successful at preventing new lentigines than are sunscreens  What is the outlook for lentigines? Lentigines usually persist  They may increase in number with age and sun exposure  Some in sun-protected sites may fade and disappear  SKIN SHAVE BIOPSY  Rationale for Procedure  A skin shave biopsy allows the dermatologist to further examine a lesion or rash under the microscope to potentially obtain a more specific diagnosis    It usually involves numbing the area with numbing medication and removing a small piece of skin  Usually, the area will be closed with sutures at the end of the procedure  Sutures are not usually needed  Description of Procedure  We would like to perform a skin shave biopsy today  A local anesthetic, similar to the kind that a dentist uses when filling a cavity, will be injected with a very small needle into the skin area to be sampled  The injected skin and tissue underneath should “go to sleep” and become numbed so that no further pain should be felt  An instrument shaped like a tiny "razor blade" (i e , the shave biopsy instrument) will be used to cut a small round piece of skin and tissue from the area so that the sample may be taken and examined more closely under the microscope  A slight amount of bleeding will occur, but it is usually stopped with direct pressure, chemical cautery, and/or a pressure bandage; rarely, electrocautery and other means of intervention may be necessary to help stop the bleeding  Sutures are not usually needed  Surgical “Vaseline-type” ointment will also applied after the procedure to help create a barrier between the wound and the outside world      Risks and Potential Complications  While the advantage of a skin shave biopsy is that it allows us to potentially examine the skin more closely under the microscope, there are some risks and potential complications that include but are not limited to the following:  Bleeding  Infection  Pain  Scar/keloid  Skin discoloration  Incomplete removal of the lesion or rash being sampled (in other words, this procedure is intended as a sampling and is not considered a definitive treatment)  Recurrence of the lesion or rash being sampled  Nerve Damage/Numbness/Loss of Function  Allergic Reaction to Anesthesia  Biopsies are diagnostic procedures and, based on findings, additional treatment or evaluation may be required (in other words, this procedure is intended as a sampling and is not considered a definitive treatment)  Loss or destruction of the sample specimen could result in no additional findings  The person at the microscope may not be able to provide additional information other than what we already know or suspect  What You Will Need to Do After the Procedure  Keep the area clean and dry  Try NOT to remove the bandage for the first 24 hours  Gently clean the area and apply Vaseline ointment (this is over the counter and not a prescription) to the biopsy site for up to 2 weeks  Generally, sutures are not needed  If any sutures were placed, return for suture removal as instructed (generally 1 week for the face, 2 weeks for the body)  Take Acetaminophen (Tylenol) for discomfort, if no contraindications  Do NOT take Ibuprofen or aspirin unless specifically told to do so by your Dermatologist because these medications can make bleeding worse  Call our office immediately for signs of infection: fever, chills, increased redness, warmth, tenderness, discomfort/pain, or pus or foul smell coming from the wound  If a small amount of bleeding is noticed, place a clean cloth over the area and apply firm pressure for ten minutes  Check the wound ONLY after 10 minutes of direct pressure; do not cheat and sneak a peak, as that does not count  If bleeding persists after 10 minutes of legitimate direct pressure, then try one more round of direct pressure for an additional 10 minutes to the area  Should the bleeding become heavier or not stop after the second attempt, call [facility] directly at [phone] (SKIN) or, if after hours, go to your local Emergency Room/Emergency Department

## 2023-01-12 NOTE — TELEPHONE ENCOUNTER
OA Questions for EGD  Date: [  001/12/23]  Screened by: Lobito Stewart  ]     Referring Provider: [ Jones Barr ]     Pre-Screening: BMI [ 21 05 ]    Past EGD? If yes - Date: [4/2022   ]  Physician/Facility: [   ]  Reason: [   ]     SCHEDULING STAFF: If the patient is over 76years old, please schedule an office visit  ·      Does the patient want to see a gastroenterologist prior to their procedure to discuss any GI symptoms? no  ·      Has the patient been hospitalized or had abdominal surgery in the past 6 months?no  ·      Does the patient use supplemental oxygen? ·      Does the patient take [Coumadin], [Lovenox], [Plavix], [Eliquis], [Xarelto], or other blood thinning medication? no  ·      Has the patient had a stroke, cardiac event, or stent placed in the past year? no     SCHEDULING STAFF: If patient answers NO to the above questions, then schedule the procedure  If patient answers YES to any of the above questions, then schedule an office appointment  ·       If a repeat EGD is belated and patient declines procedure à notify provider

## 2023-01-12 NOTE — TELEPHONE ENCOUNTER
Scheduled date of EGD(as of today):01/31/23  Physician performing EGD:alexx  Location of EGD:[facility]  Instructions reviewed with patient by: to review with pt  Clearances: none

## 2023-01-18 ENCOUNTER — PATIENT MESSAGE (OUTPATIENT)
Dept: DERMATOLOGY | Facility: CLINIC | Age: 48
End: 2023-01-18

## 2023-01-19 NOTE — RESULT ENCOUNTER NOTE
Team- please schedule 30 minute proc visit for site B    DERMATOPATHOLOGY RESULT NOTE    Results reviewed by ordering physician  Called patient to personally discuss results  No answer, left voicemail with result  Instructions for Clinical Derm Team:   (remember to route Result Note to appropriate staff):    Call patient and schedule for 30 min proc visit for excision vs ED&C    Result & Plan by Specimen:    Specimen A: benign  Plan: reassured, benign      Specimen B: malignant SCCIS  Plan: excision vs ED&C vs topical therapy (will present options at procedure visit)      Component   Case Report  Surgical Pathology Report                         Case: [id number]                                   Authorizing Provider: Allyson Alfonso MD     Collected:           01/12/2023 1321              Ordering Location:     [facility]      Received:            01/12/2023 1321                                   [facility]                                                                Pathologist:           Allyson Alfonso MD                                                       Specimens:   A) - Skin, Other, A:Right inner forearm                                                             B) - Skin, Other, B:Left upper thigh                                                     Final Diagnosis  A  Skin, Right inner forearm, Shave biopsy:  Porokeratosis       B  Skin, Left upper thigh, Shave biopsy:  Squamous cell carcinoma in-situ  (See comment)     Comment: Sections demonstrate a well demarcated epidermal proliferation of atypical squamous epithelium with numerous atypical mitotic figures and pronounced atypia, including at the most superficial levels of the epidermis  While an inflamed and irritated keratosis was considered, the findings are supportive of a diagnosis of squamous cell carcinoma in-situ       Electronically signed by Allyson Alfonso MD on 1/18/2023 at  9:00 AM  Additional Information   All reported additional testing was performed with appropriately reactive controls   These tests were developed and their performance characteristics determined by [facility] or appropriate performing facility, though some tests may be performed on tissues which have not been validated for performance characteristics (such as staining performed on alcohol exposed cell blocks and decalcified tissues)   Results should be interpreted with caution and in the context of the patients' clinical condition  These tests may not be cleared or approved by the [place]  Food and Drug Administration, though the FDA has determined that such clearance or approval is not necessary  These tests are used for clinical purposes and they should not be regarded as investigational or for research  This laboratory has been approved by [place] [address], designated as a high-complexity laboratory and is qualified to perform these tests  [facility] Description   A  The specimen is received in formalin, labeled with the patient's name and hospital number, and is designated " right inner forearm"  It consists of an unoriented, 0 8 x 0 7 by less than 0 1 cm irregular fragment of pale tan-white, glistening skin  The margin is inked green and the skin surface red  The specimen is trisected and entirely submitted between sponges in cassette A1      B  The specimen is received in formalin, labeled with the patient's name and hospital number, and is designated " left upper thigh"  It consists of an unoriented, 0 7 x 0 5 by less than 0 1 cm irregular fragment of pale tan-white, glistening skin displaying an eccentric, well-defined, 0 4 x 0 4 x 0 1 cm slightly raised, pale tan-white, glistening lesion that comes within less than 0 1 cm of the nearest margin  The margin is inked green and the skin surface red    The specimen is trisected and entirely submitted between sponges in cassette B1      Note: The estimated total formalin fixation time based upon information provided by the submitting clinician and the standard processing schedule is over 72 hours      Figueroa

## 2023-01-23 ENCOUNTER — TELEPHONE (OUTPATIENT)
Dept: GASTROENTEROLOGY | Facility: CLINIC | Age: 48
End: 2023-01-23

## 2023-01-23 NOTE — TELEPHONE ENCOUNTER
lmom confirming pt's egd scheduled on 1/31/23 at [facility] with Dr Betty Chiu  Informed [facility] would be calling 1-2 days prior with the arrival time  Informed would need to be npo after midnight and would need a  the day of the procedure due to being under sedation  I informed pt that I would be attaching her instruction in her my chart  I asked pt to please call back as I need to ask some questions for her procedure (asc assessment)   Will call pt again in a few days if do not hear back from her

## 2023-01-25 ENCOUNTER — TELEPHONE (OUTPATIENT)
Dept: GASTROENTEROLOGY | Facility: CLINIC | Age: 48
End: 2023-01-25

## 2023-01-25 NOTE — TELEPHONE ENCOUNTER
Called Dr Rajesh Chou office spoke to  whom informed that clearance request was received  Will call their office tomorrow afternoon if do not receive back from them

## 2023-01-25 NOTE — TELEPHONE ENCOUNTER
ASC Assessment    If the patient answers yes to any of these questions, schedule in a hospital  Are you pregnant: No  Do you rely on a wheelchair for mobility: No  Have you been diagnosed with End Stage Renal Disease (ESRD): No  Do you need oxygen during the day: No  Have you had a heart attack or stroke within the past three months: No  Have you had a seizure within the past three months: No  Have you ever been informed by anesthesia that you have a difficult airway: No  Additional Questions  Have you had any cardiac testing or are under the care of a Cardiologist (see cardiac list): No  Cardiac list:   Do you have an implanted cardiac defibrillator: No (Comment:  This patient should be scheduled in the hospital)    Have any bleeding problems, such as anemia or hemophilia (If patient has H&H result below 8, schedule in hospital   H&H must be within 30 days of procedure): No    Had an organ transplant within the past 3 months: No (Comment: had lung transplant in 2017)    Do you have any present infections: No  Do you get short of breath when walking a few blocks: Yes (Comment: a little)  Have you been diagnosed with diabetes: Yes  Comments (provide cardiac provider information if applicable):

## 2023-01-25 NOTE — TELEPHONE ENCOUNTER
I faxed Eliquis clearance to Dr Ulysses Guerra at [address] [facility]  Will call later today or tomorrow to make sure received  Pt is scheduled at [facility] on 1/31/23

## 2023-01-26 NOTE — TELEPHONE ENCOUNTER
I called Dr Casandra Carballo office, [facility] for someone to please call me back regarding the status of pt's clearance request   I will call them again tomorrow if do not hear back from anyone today

## 2023-01-27 DIAGNOSIS — M54.16 LUMBAR RADICULOPATHY: ICD-10-CM

## 2023-01-27 DIAGNOSIS — G89.4 CHRONIC PAIN SYNDROME: ICD-10-CM

## 2023-01-27 DIAGNOSIS — M79.671 RIGHT FOOT PAIN: ICD-10-CM

## 2023-01-27 DIAGNOSIS — E84.9 CYSTIC FIBROSIS (HCC): ICD-10-CM

## 2023-01-27 NOTE — TELEPHONE ENCOUNTER
Received clearance form back from Dr Joseph Number  Pt is cleared to hold her Eliquis  I did already leave message for pt informing her of this  Will have clearance forms scanned

## 2023-01-27 NOTE — TELEPHONE ENCOUNTER
See below  Was informed that Dr Teo Giles is clearing pt for 2 day Eliquis hold and that they will have faxed back to me by the end of the business day

## 2023-01-27 NOTE — TELEPHONE ENCOUNTER
I lmom informing pt of the 2 day Eliquis hold and asked her to please call me back to let me know that she received my message  I will watch for faxed form from Dr Elizabeth Brunson office

## 2023-01-30 RX ORDER — SODIUM CHLORIDE, SODIUM LACTATE, POTASSIUM CHLORIDE, CALCIUM CHLORIDE 600; 310; 30; 20 MG/100ML; MG/100ML; MG/100ML; MG/100ML
125 INJECTION, SOLUTION INTRAVENOUS CONTINUOUS
Status: CANCELLED | OUTPATIENT
Start: 2023-01-30

## 2023-01-30 RX ORDER — LIDOCAINE HYDROCHLORIDE 10 MG/ML
0.5 INJECTION, SOLUTION EPIDURAL; INFILTRATION; INTRACAUDAL; PERINEURAL ONCE AS NEEDED
Status: CANCELLED | OUTPATIENT
Start: 2023-01-30

## 2023-01-30 RX ORDER — ALPRAZOLAM 1 MG/1
1 TABLET ORAL
Qty: 30 TABLET | Refills: 0 | Status: SHIPPED | OUTPATIENT
Start: 2023-01-30

## 2023-01-30 RX ORDER — PREGABALIN 100 MG/1
100 CAPSULE ORAL 2 TIMES DAILY
Qty: 60 CAPSULE | Refills: 0 | Status: SHIPPED | OUTPATIENT
Start: 2023-01-30

## 2023-01-30 NOTE — PROGRESS NOTES
2/10/2018  Norlene Cowden is a 45 y o  female  [id number]        Diagnosis  Chief Complaint     Nephrolithiasis          Patient is s/p right ureteroscopy with stone extraction on 2/4/20 with Dr Sabiha Topete  Follow up in 3 months with an AP with a KUB and US prior to visit      Procedure Stent with String Removal    Vitals:    02/10/20 1034   BP: 120/72   BP Location: Right arm   Patient Position: Sitting   Cuff Size: Standard   Pulse: 70   Weight: 52 kg (114 lb 9 6 oz)   Height: 5' 2 5" (1 588 m)       Stent with string removed intact without difficulty  Reviewed post stent removal symptoms including flank pain, dysuria, and hematuria  Instructed patient to increase oral fluid intake  Encouraged the use of NSAIDS and other prescribed pain medication as needed for discomfort  Patient instructed to call the office or report to the ER for uncontrolled pain, fever, chills, nausea or vomiting  Reviewed with patient that Sabrina Bird send in an antibiotic for her to start taking today        Cassandra Izaguirre RN constant

## 2023-01-31 ENCOUNTER — ANESTHESIA EVENT (OUTPATIENT)
Dept: GASTROENTEROLOGY | Facility: AMBULATORY SURGERY CENTER | Age: 48
End: 2023-01-31

## 2023-01-31 ENCOUNTER — ANESTHESIA (OUTPATIENT)
Dept: GASTROENTEROLOGY | Facility: AMBULATORY SURGERY CENTER | Age: 48
End: 2023-01-31

## 2023-01-31 ENCOUNTER — HOSPITAL ENCOUNTER (OUTPATIENT)
Dept: GASTROENTEROLOGY | Facility: AMBULATORY SURGERY CENTER | Age: 48
Discharge: HOME | End: 2023-01-31

## 2023-01-31 VITALS
RESPIRATION RATE: 18 BRPM | SYSTOLIC BLOOD PRESSURE: 111 MMHG | DIASTOLIC BLOOD PRESSURE: 62 MMHG | WEIGHT: 110 LBS | TEMPERATURE: 97.4 F | HEIGHT: 62 IN | BODY MASS INDEX: 20.24 KG/M2 | OXYGEN SATURATION: 97 % | HEART RATE: 79 BPM

## 2023-01-31 DIAGNOSIS — G89.4 CHRONIC PAIN DISORDER: Primary | ICD-10-CM

## 2023-01-31 DIAGNOSIS — B37.81 CANDIDA ESOPHAGITIS (HCC): Primary | ICD-10-CM

## 2023-01-31 DIAGNOSIS — K31.84 GASTROPARESIS DUE TO DM (HCC): ICD-10-CM

## 2023-01-31 DIAGNOSIS — K21.00 GASTROESOPHAGEAL REFLUX DISEASE WITH ESOPHAGITIS WITHOUT HEMORRHAGE: ICD-10-CM

## 2023-01-31 DIAGNOSIS — E11.43 GASTROPARESIS DUE TO DM (HCC): ICD-10-CM

## 2023-01-31 LAB
EXT PREGNANCY TEST URINE: NEGATIVE
EXT. CONTROL: NORMAL

## 2023-01-31 RX ORDER — PROPOFOL 10 MG/ML
INJECTION, EMULSION INTRAVENOUS AS NEEDED
Status: DISCONTINUED | OUTPATIENT
Start: 2023-01-31 | End: 2023-01-31

## 2023-01-31 RX ORDER — LIDOCAINE HYDROCHLORIDE 20 MG/ML
INJECTION, SOLUTION EPIDURAL; INFILTRATION; INTRACAUDAL; PERINEURAL AS NEEDED
Status: DISCONTINUED | OUTPATIENT
Start: 2023-01-31 | End: 2023-01-31

## 2023-01-31 RX ORDER — SODIUM CHLORIDE, SODIUM LACTATE, POTASSIUM CHLORIDE, CALCIUM CHLORIDE 600; 310; 30; 20 MG/100ML; MG/100ML; MG/100ML; MG/100ML
20 INJECTION, SOLUTION INTRAVENOUS CONTINUOUS
Status: CANCELLED | OUTPATIENT
Start: 2023-01-31

## 2023-01-31 RX ORDER — FLUCONAZOLE 100 MG/1
100 TABLET ORAL DAILY
Qty: 14 TABLET | Refills: 0 | Status: SHIPPED | OUTPATIENT
Start: 2023-01-31 | End: 2023-02-14

## 2023-01-31 RX ORDER — OXYCODONE HYDROCHLORIDE 10 MG/1
10 TABLET ORAL EVERY 8 HOURS PRN
Qty: 90 TABLET | Refills: 0 | Status: SHIPPED | OUTPATIENT
Start: 2023-01-31 | End: 2023-03-02

## 2023-01-31 RX ORDER — SODIUM CHLORIDE, SODIUM LACTATE, POTASSIUM CHLORIDE, CALCIUM CHLORIDE 600; 310; 30; 20 MG/100ML; MG/100ML; MG/100ML; MG/100ML
125 INJECTION, SOLUTION INTRAVENOUS CONTINUOUS
Status: DISCONTINUED | OUTPATIENT
Start: 2023-01-31 | End: 2023-02-04 | Stop reason: HOSPADM

## 2023-01-31 RX ORDER — METOCLOPRAMIDE 10 MG/1
10 TABLET ORAL 3 TIMES DAILY
Qty: 90 TABLET | Refills: 1 | Status: SHIPPED | OUTPATIENT
Start: 2023-01-31

## 2023-01-31 RX ORDER — LIDOCAINE HYDROCHLORIDE 10 MG/ML
0.5 INJECTION, SOLUTION EPIDURAL; INFILTRATION; INTRACAUDAL; PERINEURAL ONCE AS NEEDED
Status: DISCONTINUED | OUTPATIENT
Start: 2023-01-31 | End: 2023-02-04 | Stop reason: HOSPADM

## 2023-01-31 RX ADMIN — PROPOFOL 100 MG: 10 INJECTION, EMULSION INTRAVENOUS at 08:05

## 2023-01-31 RX ADMIN — PROPOFOL 200 MG: 10 INJECTION, EMULSION INTRAVENOUS at 08:15

## 2023-01-31 RX ADMIN — PROPOFOL 50 MG: 10 INJECTION, EMULSION INTRAVENOUS at 08:08

## 2023-01-31 RX ADMIN — LIDOCAINE HYDROCHLORIDE 100 MG: 20 INJECTION, SOLUTION EPIDURAL; INFILTRATION; INTRACAUDAL; PERINEURAL at 08:05

## 2023-01-31 RX ADMIN — PROPOFOL 50 MG: 10 INJECTION, EMULSION INTRAVENOUS at 08:19

## 2023-01-31 NOTE — ANESTHESIA POSTPROCEDURE EVALUATION
Post-Op Assessment Note    CV Status:  Stable  Pain Score: 0    Pain management: adequate     Mental Status:  Awake   Hydration Status:  Stable   PONV Controlled:  Controlled   Airway Patency:  Patent      Post Op Vitals Reviewed: Yes      Staff: CRNA         No notable events documented      BP  102/50   Temp     Pulse  78   Resp   11   SpO2   98

## 2023-01-31 NOTE — ANESTHESIA PREPROCEDURE EVALUATION
Procedure:  EGD    Relevant Problems   ANESTHESIA  No recent PONV      CARDIO   (+) DVT (deep venous thrombosis) (HCC)   (+) Essential hypertension   (+) Hypercholesteremia   (+) Pulmonary hypertension (HCC)   (+) Rib pain      ENDO   (+) Type 1 diabetes (HCC)      GI/HEPATIC   (+) Esophageal reflux   (+) Hiatal hernia   (+) Pancreatic insufficiency      /RENAL   (+) CKD (chronic kidney disease) stage 2, GFR 60-89 ml/min   (+) Renal calculus      HEMATOLOGY   (+) Acute on chronic anemia      NEURO/PSYCH   (+) Anxiety   (+) Chronic pain disorder      PULMONARY  S/P lung transplant, now with good exercise tolerance   (+) Asthma      Respiratory   (+) Cystic fibrosis (HCC)      Digestive   (+) Gastroparesis      Musculoskeletal and Integument   (+) Osteoporosis      Other   (+) Immunosuppression (HCC)   (+) Long term current use of systemic steroids   (+) Neuropathic pain of right foot        Physical Exam    Airway    Mallampati score: II  TM Distance: >3 FB  Neck ROM: full     Dental   No notable dental hx     Cardiovascular      Pulmonary      Other Findings        Anesthesia Plan  ASA Score- 3     Anesthesia Type- IV sedation with anesthesia with ASA Monitors  Additional Monitors:   Airway Plan:           Plan Factors-Exercise tolerance (METS): >4 METS  Chart reviewed  EKG reviewed  Existing labs reviewed  Patient summary reviewed  Patient is not a current smoker  Induction-     Postoperative Plan-     Informed Consent- Anesthetic plan and risks discussed with patient  I personally reviewed this patient with the CRNA  Discussed and agreed on the Anesthesia Plan with the CRNA  Lupe Eldridge

## 2023-01-31 NOTE — H&P
History and Physical - [facility]  Salome Fortune 41 y o  female MRN: [id number]                  HPI: Jonathan Rubio is a 41y o  year old female who presents for heartburn, reflux symptoms and a history of intractable nausea and vomiting      REVIEW OF SYSTEMS: Per the HPI, and otherwise unremarkable  Historical Information   Past Medical History:   Diagnosis Date   • ABPA (allergic bronchopulmonary aspergillosis) ([facility] ) 7/8/2012   • Acute kidney injury ([facility] ) 9/8/2017   • Acute on chronic respiratory failure ([facility] ) 3/4/2017    Last Assessment & Plan:  Formatting of this note might be different from the original  Recurrent with clinical worsening with enlarging PTX, s/p multiple chest tubes, but new fevers 6/19 and worsening leukocytosis indicating infection as cause of 6/19 decompensation with profound hypoxia in setting of shunting through right lung and continued pneumothorax s/p 2nd chest tube placement  6/27: Right   • Anaphylaxis 6/6/2022   • Asthma    • Chest pain 9/21/2021   • Chronic pain    • Chronic respiratory failure with hypoxia ([facility] ) 9/7/2021   • COVID-19 6/3/2022   • Cystic fibrosis ([facility] )    • Diabetes mellitus ([facility] )    • GERD (gastroesophageal reflux disease)    • HBP (high blood pressure)    • HCAP (healthcare-associated pneumonia) 3/6/2020   • Influenza B 2/23/2020   • Kidney stone    • Neutropenic fever ([facility] ) 9/26/2020   • Pneumonia due to Pseudomonas species ([facility] ) 10/28/2010    Last Assessment & Plan:  Formatting of this note might be different from the original  Polymicrobial pna w/ pseudomonas, M   Abscessus, Cadida glabrata/albicans   • PONV (postoperative nausea and vomiting)    • Pseudomonas aeruginosa infection 9/20/2012   • Transplant recipient     Lung- 2013, 2017   • Wellness examination 2/4/2013    Formatting of this note might be different from the original  Discussed with patient and acknowledgement form signed on 2/4/2013 (ALYSHA)     Past Surgical History:   Procedure Laterality Date   • APPENDECTOMY     • AUGMENTATION MAMMAPLASTY Bilateral 2004   • AUGMENTATION MAMMAPLASTY Right 2021    removed    • BREAST SURGERY      Aug     • BRONCHOSCOPY     • CHOLECYSTECTOMY     • COLONOSCOPY     • EGD  07/2019    Yeast Infect      • FL RETROGRADE PYELOGRAM  1/9/2020   • FL RETROGRADE PYELOGRAM  2/4/2020   • GASTROSTOMY TUBE, PLACE     • IR BALLOON-OCCLUDED ANTEGRADE TRANSVENOUS OBLITERATION (BATO)  11/22/2022   • IR PORT PLACEMENT  3/22/2022   • IR PORT REMOVAL  1/10/2022   • IR PORT STRIPPING  11/25/2022   • IR TUNNELED CENTRAL LINE PLACEMENT  1/13/2022   • LUNG SURGERY     • LUNG TRANSPLANT      X2   • AR CYSTO BLADDER W/URETERAL CATHETERIZATION Right 1/9/2020    Procedure: CYSTOSCOPY RETROGRADE PYELOGRAM WITH INSERTION STENT URETERAL;  Surgeon: Snow Harris MD;  Location: [facility];  Service: Urology   • AR CYSTO/URETERO W/LITHOTRIPSY &INDWELL STENT INSRT Right 2/4/2020    Procedure: CYSTOSCOPY URETEROSCOPY WITH LITHOTRIPSY HOLMIUM LASER, RETROGRADE PYELOGRAM AND INSERTION STENT URETERAL;  Surgeon: Snow Harris MD;  Location: [facility];  Service: Urology   • US GUIDED VASCULAR ACCESS  1/17/2017     Social History   Social History     Substance and Sexual Activity   Alcohol Use Not Currently    Comment: social     Social History     Substance and Sexual Activity   Drug Use Yes   • Frequency: 2 0 times per week   • Types: Marijuana    Comment: medical edible marijuana prescribed     Social History     Tobacco Use   Smoking Status Former   • Packs/day: 0 50   • Years: 12 00   • Pack years: 6 00   • Types: Cigarettes   Smokeless Tobacco Never     Family History   Problem Relation Age of Onset   • Alcohol abuse Mother    • Mental illness Mother    • Alcohol abuse Father    • COPD Maternal Grandmother    • Lung cancer Maternal Grandmother 72   • COPD Maternal Grandfather    • COPD Paternal Grandmother    • Lung cancer Paternal Grandmother 72   • No Known Problems Sister    • No Known Problems Paternal Aunt    • Breast cancer Neg Hx    • Ovarian cancer Neg Hx    • Colon cancer Neg Hx        Meds/Allergies     (Not in a hospital admission)      Allergies   Allergen Reactions   • Bebtelovimab Anaphylaxis   • Vancomycin Angioedema and Hives   • Gabapentin Hallucinations   • Ceftazidime Headache     Severe headaches after desensitization   • Nsaids Other (See Comments)     Lung transplant increases risk of renal toxicity, call lung txp provider to discuss if needed       Objective     BP (!) 172/83   Pulse 92   Temp (!) 97 4 °F (36 3 °C) (Temporal)   Resp 18   Ht 5' 2" (1 575 m)   Wt 49 9 kg (110 lb)   SpO2 100%   BMI 20 12 kg/m²       PHYSICAL EXAM    Gen: NAD  CV: RRR  CHEST: Clear  ABD: soft, NT/ND  EXT: no edema      ASSESSMENT/PLAN:  This is a 41y o  year old female here for EGD, and she is stable and optimized for her procedure

## 2023-02-01 ENCOUNTER — TELEMEDICINE (OUTPATIENT)
Dept: INTERNAL MEDICINE CLINIC | Facility: CLINIC | Age: 48
End: 2023-02-01

## 2023-02-01 DIAGNOSIS — I26.99 ACUTE PULMONARY EMBOLISM, UNSPECIFIED PULMONARY EMBOLISM TYPE, UNSPECIFIED WHETHER ACUTE COR PULMONALE PRESENT (HCC): ICD-10-CM

## 2023-02-01 DIAGNOSIS — R22.0 FACIAL SWELLING: Primary | ICD-10-CM

## 2023-02-01 NOTE — PROGRESS NOTES
INTERNAL MEDICINE FOLLOW-UP OFFICE VISIT  [facility] - [facility] INTERNAL MEDICINE [place]    NAME: Maximilian Fortune  AGE: 41 y o  SEX: female  : 1975     DATE: 2023     Assessment and Plan:     1  Facial swelling  Has some periorbital edema which apparently is improving with antihistaminics  Doubt any thrombotic event given no evidence of any weakness or neurological deficits noted  Nothing to suggest SVC syndrome either  Likely an allergic -recommended that she monitor herself for the next 24 to 48 hours and should there be any worsening of the swelling or development of any weakness or slurred speech to call us immediately  If allergic that should subside in the next 24 to 48 hours  She has restarted her Eliquis    2  Acute pulmonary embolism, unspecified pulmonary embolism type, unspecified whether acute cor pulmonale present (HCC)  History of DVT on Eliquis which was held for EGD yesterday  Chief Complaint:     Facial swelling     History of Present Illness:     Fermin Ogden is  42 y o  female with history of type 1 diabetes mellitus, cystic fibrosis, DVT/PE, on long-term steroids and status post right-sided pneumonectomy-who underwent an EGD yesterday for symptoms of heartburn, intractable nausea and vomiting-patient's Eliquis was held for the procedure  This morning she woke up with her face being more puffy especially over the eyelids without any weakness, vision issues or speech problems  She took an antihistaminic and is feeling slightly better however given the concern for past history of blood clots and given that she was off the Eliquis for the procedure she was worried with that she had a brain clot  Patient awake interactive and without any facial weakness      The following portions of the patient's history were reviewed and updated as appropriate: allergies, current medications, past family history, past medical history, past social history, past surgical history and problem list      Review of Systems:     Review of Systems no new shortness of breath, fever, chest pain, palpitations  All other review of symptoms negative unless specified otherwise     Problem List:     Patient Active Problem List   Diagnosis   • S/P pneumonectomy   • Type 1 diabetes (HCC)   • Pancreatic insufficiency   • Asthma   • Chronic pain disorder   • DVT (deep venous thrombosis) (HCC)   • Acute on chronic anemia   • Long term current use of bisphosphonates   • Long term current use of systemic steroids   • Osteoporosis   • Anxiety   • CKD (chronic kidney disease) stage 2, GFR 60-89 ml/min   • Chronic sinusitis   • Colon polyp   • Esophageal reflux   • Hiatal hernia   • Immunosuppression (HCC)   • Severe protein-calorie malnutrition (HCC)   • Transplant rejection   • Cystic fibrosis ([facility] )   • Essential hypertension   • Hypercholesteremia   • Weakness of right lower extremity   • Moderate protein-calorie malnutrition (HCC)   • Neuropathic pain of right foot   • Ulcerative pancolitis without complication (HCC)   • Gastroparesis   • Abnormal uterine bleeding (AUB)   • Pulmonary hypertension (HCC)   • Renal calculus   • Pulmonary nodules   • Rib pain   • Chronic cough   • Right leg pain   • Pulmonary embolus (HCC)   • Acute bronchitis   • Transplant recipient        Objective: There were no vitals taken for this visit      Physical Exam   General Appearance:    Alert, cooperative, : no acute distress, not anxious appearing, speaking comfortably  Moon face, mild periorbital swelling   Head:    Normocephalic, without obvious abnormality, atraumatic   Eyes:   No evidence of any conjunctival congestion or proptosis      Neck:   Supple, no adenopathy, no JVD  No dilated veins                                   Time spent 15 minutes    Divina Montoya MD  [facility] INTERNAL MEDICINE [place]

## 2023-02-03 ENCOUNTER — TELEPHONE (OUTPATIENT)
Dept: GASTROENTEROLOGY | Facility: CLINIC | Age: 48
End: 2023-02-03

## 2023-02-03 NOTE — TELEPHONE ENCOUNTER
----- Message from Nazario Robledo MD sent at 2/3/2023  9:15 AM EST -----  Regarding: FW: TIF procedure  Contact: [phone]  Please schedule her for TIF at [facility] in march, it's approve by her insurance and she had full work up done in past    Thanks    ----- Message -----  From: Ginny Powers LPN  Sent: 8/3/1745   2:01 PM EST  To: Nazario Robledo MD  Subject: Shelia Bailey: TIF procedure                                  ----- Message -----  From: Nicole Noel  Sent: 2/2/2023   1:54 PM EST  To: [address] [facility]  Subject: TIF procedure                                    Hi,     Now that Malena  had my endoscopy and we have the results and I’m taking the medication’s that I need to be on can you please let me know the earliest that we can schedule for TIF procedure?     Thank you in advance,   [facility]

## 2023-02-06 NOTE — TELEPHONE ENCOUNTER
[place] back from OR schedule and yes, room 2 on 3/29/23  I lmom for pt to please call back to schedule the TIF procedure with Dr Loretta Hebert  Will call pt again in one week if do not hear back from her

## 2023-02-07 NOTE — TELEPHONE ENCOUNTER
Scheduled date of TIF (as of today): 3/29/23  Physician performing TIF: Dr Hu [place]  Location of TIF: [facility]  Instructions reviewed with patient by: via my chart  Clearances: Will fax Eliquis clearance to Dr Nick Camarena at [facility] one month prior to pt's procedure fax # [phone]  Ph# [phone]

## 2023-02-08 ENCOUNTER — OFFICE VISIT (OUTPATIENT)
Dept: ENDOCRINOLOGY | Facility: CLINIC | Age: 48
End: 2023-02-08

## 2023-02-08 VITALS
SYSTOLIC BLOOD PRESSURE: 132 MMHG | TEMPERATURE: 96.4 F | WEIGHT: 111 LBS | HEART RATE: 93 BPM | HEIGHT: 62 IN | OXYGEN SATURATION: 99 % | DIASTOLIC BLOOD PRESSURE: 92 MMHG | BODY MASS INDEX: 20.43 KG/M2

## 2023-02-08 DIAGNOSIS — M81.0 OSTEOPOROSIS, UNSPECIFIED OSTEOPOROSIS TYPE, UNSPECIFIED PATHOLOGICAL FRACTURE PRESENCE: ICD-10-CM

## 2023-02-08 DIAGNOSIS — E10.22 TYPE 1 DIABETES MELLITUS WITH DIABETIC CHRONIC KIDNEY DISEASE, UNSPECIFIED CKD STAGE (HCC): Primary | ICD-10-CM

## 2023-02-08 DIAGNOSIS — E10.22 TYPE 1 DIABETES MELLITUS WITH DIABETIC CHRONIC KIDNEY DISEASE, UNSPECIFIED CKD STAGE (HCC): ICD-10-CM

## 2023-02-08 RX ORDER — LANOLIN ALCOHOL/MO/W.PET/CERES
CREAM (GRAM) TOPICAL DAILY
COMMUNITY

## 2023-02-08 RX ORDER — BLOOD-GLUCOSE TRANSMITTER
1 EACH MISCELLANEOUS CONTINUOUS
Qty: 3 EACH | Refills: 0 | Status: SHIPPED | OUTPATIENT
Start: 2023-02-08

## 2023-02-08 RX ORDER — BLOOD-GLUCOSE SENSOR
1 EACH MISCELLANEOUS
Qty: 12 EACH | Refills: 3 | Status: SHIPPED | OUTPATIENT
Start: 2023-02-08 | End: 2023-02-09

## 2023-02-08 NOTE — PATIENT INSTRUCTIONS
Humalog Insulin 5 7 8     Regular, Apidra, Humalog orNovolog Sliding Scale:   <80                      151-200 + 1 +1 +1     201-250 +2 +2 +2 +   251-300 +3 +3 +3 +   351-400 +4 +4 +4 +   >401 +5 +5 +5 +         Lantus Insulin       12u

## 2023-02-09 RX ORDER — BLOOD-GLUCOSE SENSOR
1 EACH MISCELLANEOUS
Qty: 12 EACH | Refills: 3 | Status: SHIPPED | OUTPATIENT
Start: 2023-02-09

## 2023-02-17 LAB — HBA1C MFR BLD HPLC: 8.5 %

## 2023-02-17 PROCEDURE — 3052F HG A1C>EQUAL 8.0%<EQUAL 9.0%: CPT | Performed by: INTERNAL MEDICINE

## 2023-02-20 ENCOUNTER — LAB REQUISITION (OUTPATIENT)
Dept: LAB | Facility: HOSPITAL | Age: 48
End: 2023-02-20

## 2023-02-20 DIAGNOSIS — E10.22 TYPE 1 DIABETES MELLITUS WITH DIABETIC CHRONIC KIDNEY DISEASE (HCC): ICD-10-CM

## 2023-02-20 DIAGNOSIS — E10.22 TYPE 1 DIABETES MELLITUS WITH DIABETIC CHRONIC KIDNEY DISEASE, UNSPECIFIED CKD STAGE (HCC): ICD-10-CM

## 2023-02-20 DIAGNOSIS — K21.00 GASTROESOPHAGEAL REFLUX DISEASE WITH ESOPHAGITIS WITHOUT HEMORRHAGE: Primary | ICD-10-CM

## 2023-02-20 DIAGNOSIS — G47.00 INSOMNIA, UNSPECIFIED TYPE: Primary | ICD-10-CM

## 2023-02-20 RX ORDER — DOXEPIN HYDROCHLORIDE 6 MG/1
6 TABLET ORAL
Qty: 30 TABLET | Refills: 0 | Status: SHIPPED | OUTPATIENT
Start: 2023-02-20 | End: 2023-02-22

## 2023-02-20 RX ORDER — FAMOTIDINE 40 MG/1
40 TABLET, FILM COATED ORAL DAILY
Qty: 30 TABLET | Refills: 2 | Status: SHIPPED | OUTPATIENT
Start: 2023-02-20 | End: 2023-03-22

## 2023-02-21 LAB
CREAT UR-MCNC: 26.7 MG/DL
MICROALBUMIN UR-MCNC: 17 MG/L (ref 0–20)
MICROALBUMIN/CREAT 24H UR: 64 MG/G CREATININE (ref 0–30)

## 2023-02-21 RX ORDER — BLOOD-GLUCOSE SENSOR
1 EACH MISCELLANEOUS
Qty: 12 EACH | Refills: 0 | Status: SHIPPED | OUTPATIENT
Start: 2023-02-21

## 2023-02-21 RX ORDER — BLOOD-GLUCOSE,RECEIVER,CONT
1 EACH MISCELLANEOUS CONTINUOUS
Qty: 1 EACH | Refills: 0 | Status: SHIPPED | OUTPATIENT
Start: 2023-02-21

## 2023-02-21 RX ORDER — BLOOD-GLUCOSE TRANSMITTER
1 EACH MISCELLANEOUS CONTINUOUS
Qty: 3 EACH | Refills: 0 | Status: SHIPPED | OUTPATIENT
Start: 2023-02-21

## 2023-02-22 ENCOUNTER — TELEPHONE (OUTPATIENT)
Dept: INTERNAL MEDICINE CLINIC | Facility: CLINIC | Age: 48
End: 2023-02-22

## 2023-02-22 ENCOUNTER — TELEPHONE (OUTPATIENT)
Dept: ENDOCRINOLOGY | Facility: CLINIC | Age: 48
End: 2023-02-22

## 2023-02-22 DIAGNOSIS — G47.00 INSOMNIA, UNSPECIFIED TYPE: Primary | ICD-10-CM

## 2023-02-22 RX ORDER — ESZOPICLONE 1 MG/1
1 TABLET, FILM COATED ORAL
Qty: 30 TABLET | Refills: 0 | Status: SHIPPED | OUTPATIENT
Start: 2023-02-22

## 2023-02-22 NOTE — TELEPHONE ENCOUNTER
I called to the insurance since the doxepin in non formulary  The alternatives are Belsomra, eszopiclone, or zaleplon  Please review for best option for this patient   Thank you

## 2023-02-27 ENCOUNTER — OFFICE VISIT (OUTPATIENT)
Dept: INTERNAL MEDICINE CLINIC | Facility: CLINIC | Age: 48
End: 2023-02-27

## 2023-02-27 VITALS
BODY MASS INDEX: 21.29 KG/M2 | TEMPERATURE: 96.4 F | WEIGHT: 116.4 LBS | DIASTOLIC BLOOD PRESSURE: 98 MMHG | SYSTOLIC BLOOD PRESSURE: 178 MMHG | HEART RATE: 96 BPM | OXYGEN SATURATION: 97 % | RESPIRATION RATE: 20 BRPM

## 2023-02-27 DIAGNOSIS — K21.00 GASTROESOPHAGEAL REFLUX DISEASE WITH ESOPHAGITIS WITHOUT HEMORRHAGE: ICD-10-CM

## 2023-02-27 DIAGNOSIS — R22.2 ABDOMINAL WALL LUMP: Primary | ICD-10-CM

## 2023-02-27 DIAGNOSIS — E10.22 TYPE 1 DIABETES MELLITUS WITH DIABETIC CHRONIC KIDNEY DISEASE, UNSPECIFIED CKD STAGE (HCC): ICD-10-CM

## 2023-02-27 DIAGNOSIS — M79.2 NEUROPATHIC PAIN OF RIGHT FOOT: ICD-10-CM

## 2023-02-27 DIAGNOSIS — G89.4 CHRONIC PAIN SYNDROME: ICD-10-CM

## 2023-02-27 DIAGNOSIS — M79.671 RIGHT FOOT PAIN: ICD-10-CM

## 2023-02-27 DIAGNOSIS — M54.16 LUMBAR RADICULOPATHY: ICD-10-CM

## 2023-02-27 DIAGNOSIS — I10 ESSENTIAL HYPERTENSION: ICD-10-CM

## 2023-02-27 RX ORDER — INSULIN GLARGINE 100 [IU]/ML
INJECTION, SOLUTION SUBCUTANEOUS
Qty: 15 ML | Refills: 1 | Status: SHIPPED | OUTPATIENT
Start: 2023-02-27

## 2023-02-27 RX ORDER — INSULIN GLARGINE 100 [IU]/ML
INJECTION, SOLUTION SUBCUTANEOUS
Qty: 15 ML | Refills: 3 | Status: SHIPPED | OUTPATIENT
Start: 2023-02-27 | End: 2023-02-27

## 2023-02-27 RX ORDER — PREGABALIN 150 MG/1
150 CAPSULE ORAL 2 TIMES DAILY
Qty: 180 CAPSULE | Refills: 1 | Status: SHIPPED | OUTPATIENT
Start: 2023-02-27

## 2023-02-27 NOTE — PROGRESS NOTES
INTERNAL MEDICINE FOLLOW-UP OFFICE VISIT  [facility] INTERNAL MEDICINE [place]    NAME: Maximilian Fortune  AGE: 41 y o  SEX: female  : 1975     DATE: 2023     Assessment and Plan:     1  Abdominal wall lump  Assessment & Plan:  Patient presents with left-sided abdominal lump  Lateral and inferior to the umbilicus  Patient injects insulin to the same spot every day  No pain, redness, warmth  Patient does not have fever or chills  On examination lump has no clear margins  Soft to palpate  No signs of infection  No pain  Diagnosis: Fluid collection beneath the skin due to repeated insulin injections    Plan  Advised patient not to inject insulin to the same site in anymore  Advised to rotate the insulin injection site  Fluid will get reabsorbed and will get resolved spontaneously with time  Call provider if patient develops pain, redness, warmth, fever or chills  2  Neuropathic pain of right foot  Assessment & Plan:  Patient requsts to increase Lyrica to 150 mg as 100 mg does not help with her pain  Plan  Will increase Lyrica to 150 mg       3  Chronic pain syndrome  -     pregabalin (LYRICA) 150 mg capsule; Take 1 capsule (150 mg total) by mouth 2 (two) times a day    4  Lumbar radiculopathy  -     pregabalin (LYRICA) 150 mg capsule; Take 1 capsule (150 mg total) by mouth 2 (two) times a day    5  Type 1 diabetes mellitus with diabetic chronic kidney disease, unspecified CKD stage [facility])  Assessment & Plan:    Lab Results   Component Value Date    HGBA1C 7 3 (H) 11/10/2022     Patient has 1 diabetes mellitus related to cystic fibrosis for 8 years  No episodes of hypoglycemia  Plan  Following up with endocrinology  Glargine 12u QHS  Humalog 5u - 5u - 6u plus 1 unit /50 mg/dL above 150 mg/dL  6  Gastroesophageal reflux disease with esophagitis without hemorrhage  Assessment & Plan:  Patient is experiencing severe GERD    Patient cannot sleep in lying down position as she gets vomiting due to severe regurgitation  Patient has to sleep while sitting  Plan  Scheduled for transoral fundoplication  Currently on Dexlansoprazole, famotidine and reglan  7  Essential hypertension  Assessment & Plan:  Has a history of hypertension  Currently normotensive  Not on any medications  BP today: 178/98, Repeat: 160/80  Patient always has elevated BP when she visit doctor's office  But having normal BP when she's at home  Plan  Will monitor off medications  8  Right foot pain  -     pregabalin (LYRICA) 150 mg capsule; Take 1 capsule (150 mg total) by mouth 2 (two) times a day      No follow-ups on file  Chief Complaint:     No chief complaint on file  History of Present Illness:     Mrs Marybeth Knutson is a 44-year-old female with cystic fibrosis, s/p lung transplant, type 1 diabetes mellitus, pulmonary hypertension, CKD stage II, asthma, osteoporosis who presents to the office for the evaluation of abdominal wall lump  Patient has a lump in the left abdomen inferior and lateral to the umbilicus  Patient injects insulin in the same spot every day  Does not experience any pain, redness, warmth, fever or chills  Patient also requested to increase Lyrica to 150 mg  Patient was on 100 mg for couple of months  But it did not help with her neuropathic pain in her feet  Patient is sleeping better compared to previous days  But she has to sleep in sitting position as she has severe regurgitations  She is scheduled for fundoplication  Following up with pulmonology outpatient regarding her lung transplantation  Also following up with endocrinology for type 1 diabetes mellitus management  Patient's blood pressure is elevated in the office today  But according to patient she always has elevated blood pressure every time she visits a doctor  Her blood pressure at home is within normal range        The following portions of the patient's history were reviewed and updated as appropriate: allergies, current medications, past family history, past medical history, past social history, past surgical history and problem list      Review of Systems:     Review of Systems   Constitutional: Negative for activity change, chills, fatigue and fever  HENT: Negative for congestion and sore throat  Respiratory: Negative for cough, shortness of breath and wheezing  Cardiovascular: Negative for chest pain, palpitations and leg swelling  Gastrointestinal: Negative for abdominal pain, constipation, diarrhea and nausea  Genitourinary: Negative for dysuria and flank pain  Musculoskeletal: Negative for arthralgias and myalgias  Neurological: Negative for light-headedness and headaches  Psychiatric/Behavioral: Negative for agitation, behavioral problems and confusion          Problem List:     Patient Active Problem List   Diagnosis   • S/P pneumonectomy   • Type 1 diabetes (HCC)   • Pancreatic insufficiency   • Asthma   • Chronic pain disorder   • DVT (deep venous thrombosis) ([facility])   • Acute on chronic anemia   • Long term current use of bisphosphonates   • Long term current use of systemic steroids   • Osteoporosis   • Anxiety   • CKD (chronic kidney disease) stage 2, GFR 60-89 ml/min   • Chronic sinusitis   • Colon polyp   • Esophageal reflux   • Hiatal hernia   • Immunosuppression (HCC)   • Severe protein-calorie malnutrition (HCC)   • Transplant rejection   • Cystic fibrosis ([facility] )   • Essential hypertension   • Hypercholesteremia   • Weakness of right lower extremity   • Moderate protein-calorie malnutrition (HCC)   • Neuropathic pain of right foot   • Ulcerative pancolitis without complication (HCC)   • Gastroparesis   • Abnormal uterine bleeding (AUB)   • Pulmonary hypertension (HCC)   • Renal calculus   • Pulmonary nodules   • Rib pain   • Chronic cough   • Right leg pain   • Pulmonary embolus (HCC)   • Acute bronchitis   • Transplant recipient • Abdominal wall lump        Objective:     BP (!) 178/98 (BP Location: Left arm, Patient Position: Sitting, Cuff Size: Large)   Pulse 96   Temp (!) 96 4 °F (35 8 °C)   Resp 20   Wt 52 8 kg (116 lb 6 4 oz)   SpO2 97%   BMI 21 29 kg/m²     Physical Exam  Constitutional:       General: She is not in acute distress  Appearance: Normal appearance  She is not ill-appearing  HENT:      Head: Normocephalic  Nose: No congestion  Mouth/Throat:      Mouth: Mucous membranes are moist       Pharynx: Oropharynx is clear  Cardiovascular:      Rate and Rhythm: Normal rate and regular rhythm  Pulses: Normal pulses  Heart sounds: Normal heart sounds  Pulmonary:      Effort: Pulmonary effort is normal       Breath sounds: Normal breath sounds  Abdominal:      General: Abdomen is flat  Bowel sounds are normal       Palpations: Abdomen is soft  Tenderness: There is no abdominal tenderness  There is no guarding or rebound  Hernia: No hernia is present  There is no hernia in the umbilical area, left inguinal area or right inguinal area  Comments: A lump without clear margins  Nontender, no redness or warmth  Soft to palpate  Musculoskeletal:      Right lower leg: No edema  Left lower leg: No edema  Skin:     General: Skin is warm and dry  Capillary Refill: Capillary refill takes less than 2 seconds  Neurological:      General: No focal deficit present  Mental Status: She is alert and oriented to person, place, and time     Psychiatric:         Mood and Affect: Mood normal          Behavior: Behavior normal          Pertinent Laboratory/Diagnostic Studies:    Laboratory Results: I have personally reviewed the pertinent laboratory results/reports     CBC:   Results from Last 12 Months   Lab Units 11/09/22  2231 11/08/22  2121 11/08/22  1820   WBC Thousand/uL 5 96   < > 6 60   RBC Million/uL 3 86   < > 3 01*   HEMOGLOBIN g/dL 9 3*   < > 6 3*   HEMATOCRIT % 31 2*   < > 22 6*   MCV fL 81*   < > 75*   MCH pg 24 1*   < > 20 9*   MCHC g/dL 29 8*   < > 27 9*   RDW % 21 1*   < > 18 6*   MPV fL 10 4   < > 10 7   PLATELETS Thousands/uL 212   < > 206   NRBC AUTO /100 WBCs  --   --  0   NEUTROS PCT %  --   --  51   LYMPHS PCT %  --   --  30   MONOS PCT %  --   --  16*   EOS PCT %  --   --  3   BASOS PCT %  --   --  0   NEUTROS ABS Thousands/µL  --   --  3 41   LYMPHS ABS Thousands/µL  --   --  1 96   MONOS ABS Thousand/µL  --   --  1 03   EOS ABS Thousand/µL  --   --  0 17    < > = values in this interval not displayed  Chemistry Profile:   Results from Last 12 Months   Lab Units 11/09/22  0650 11/08/22  1820   POTASSIUM mmol/L 4 5 4 4   CHLORIDE mmol/L 105 103   CO2 mmol/L 21 19*   BUN mg/dL 15 19   CREATININE mg/dL 1 00 1 17   GLUCOSE FASTING mg/dL 90  --    GLUCOSE RANDOM mg/dL 90 126   CALCIUM mg/dL 8 2* 7 9*   CORRECTED CALCIUM mg/dL  --  8 9   MAGNESIUM mg/dL  --  1 5*   AST U/L  --  32   ALT U/L  --  18   ALK PHOS U/L  --  145*   EGFR ml/min/1 73sq m 68 56       Radiology/Other Diagnostic Testing Results: I have personally reviewed pertinent reports        [address] [facility] Eboni Schofield MD  [facility] INTERNAL MEDICINE [address] [facility]

## 2023-02-27 NOTE — ASSESSMENT & PLAN NOTE
Patient presents with left-sided abdominal lump  Lateral and inferior to the umbilicus  Patient injects insulin to the same spot every day  No pain, redness, warmth  Patient does not have fever or chills  On examination lump has no clear margins  Soft to palpate  No signs of infection  No pain  Diagnosis: Fluid collection beneath the skin due to repeated insulin injections    Plan  Advised patient not to inject insulin to the same site in anymore  Advised to rotate the insulin injection site  Fluid will get reabsorbed and will get resolved spontaneously with time  Call provider if patient develops pain, redness, warmth, fever or chills

## 2023-02-27 NOTE — ASSESSMENT & PLAN NOTE
Patient is experiencing severe GERD  Patient cannot sleep in lying down position as she gets vomiting due to severe regurgitation  Patient has to sleep while sitting  Plan  Scheduled for transoral fundoplication  Currently on Dexlansoprazole, famotidine and reglan

## 2023-02-27 NOTE — ASSESSMENT & PLAN NOTE
Patient requsts to increase Lyrica to 150 mg as 100 mg does not help with her pain  Plan  Will increase Lyrica to 150 mg

## 2023-02-27 NOTE — ASSESSMENT & PLAN NOTE
Has a history of hypertension  Currently normotensive  Not on any medications  BP today: 178/98, Repeat: 160/80  Patient always has elevated BP when she visit doctor's office  But having normal BP when she's at home  Plan  Will monitor off medications

## 2023-02-27 NOTE — ASSESSMENT & PLAN NOTE
Lab Results   Component Value Date    HGBA1C 7 3 (H) 11/10/2022     Patient has 1 diabetes mellitus related to cystic fibrosis for 8 years  No episodes of hypoglycemia  Plan  Following up with endocrinology  Glargine 12u QHS  Humalog 5u - 5u - 6u plus 1 unit /50 mg/dL above 150 mg/dL

## 2023-03-01 ENCOUNTER — TELEPHONE (OUTPATIENT)
Dept: GASTROENTEROLOGY | Facility: CLINIC | Age: 48
End: 2023-03-01

## 2023-03-01 NOTE — TELEPHONE ENCOUNTER
----- Message from Lopez Mckeon sent at 2/27/2023 10:55 PM EST -----  Regarding: TIF post surgical diet   Contact: [phone]  Hi,     Can you please send me the post surgical diet information for my upcoming TIF procedure?     Thank you,   ETHEL Jenkins

## 2023-03-05 DIAGNOSIS — E84.9 CYSTIC FIBROSIS (HCC): ICD-10-CM

## 2023-03-05 RX ORDER — OXYCODONE HYDROCHLORIDE 10 MG/1
10 TABLET ORAL EVERY 8 HOURS PRN
Qty: 90 TABLET | Refills: 0 | Status: SHIPPED | OUTPATIENT
Start: 2023-03-05

## 2023-03-05 RX ORDER — ALPRAZOLAM 1 MG/1
1 TABLET ORAL
Qty: 30 TABLET | Refills: 0 | Status: SHIPPED | OUTPATIENT
Start: 2023-03-05

## 2023-03-09 ENCOUNTER — TELEPHONE (OUTPATIENT)
Dept: DERMATOLOGY | Facility: CLINIC | Age: 48
End: 2023-03-09

## 2023-03-09 NOTE — TELEPHONE ENCOUNTER
Telephone call to patient to move up surgery from May   Patient is rescheduled to 3/16/2023 at 10:15 Am with Dr Yasmin Cedeño for excision of SCC on thigh

## 2023-03-16 ENCOUNTER — PROCEDURE VISIT (OUTPATIENT)
Dept: DERMATOLOGY | Facility: CLINIC | Age: 48
End: 2023-03-16

## 2023-03-16 VITALS — WEIGHT: 115 LBS | TEMPERATURE: 98.1 F | BODY MASS INDEX: 21.16 KG/M2 | HEIGHT: 62 IN

## 2023-03-16 DIAGNOSIS — D09.9 SQUAMOUS CELL CARCINOMA IN SITU: Primary | ICD-10-CM

## 2023-03-16 NOTE — PROGRESS NOTES
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  Post-Op Diagnosis: Same   Benign versus Malignant Malignant      Lesion Anatomic Location: Left upper thigh (Previous Accession Number: [id number])  Pre-op size: 0 9 cm  Size of defect:  1 9 cm (with 0 5  centimeter margins)  Final repaired wound length:  5 5 cm    Written and verbal, witnessed informed consent was obtained  I discussed that excision is a method of removing lesions both benign and malignant lesions  A portion of normal skin is often taken to ensure completeness of removal   I reviewed that procedure will include numbing the area,  cutting around and under defect, undermining tissue, and closing the wound with sutures both inside and out  These sutures are usually removed in 7 to 14 days  Risks (bleeding, pain, infection, scarring, recurrence) and benefits discussed  It was discussed with patient that every effort is made to minimize scar, but scarring is influenced also by extrinsic factor such as location, age and genetics  Time Out: performed:  yes  Correct patient: yes  Correct site per Clinic Report: yes  Correct site per Patient Report: yes    LOCAL ANESTHESIA: 1% xylocaine with epi     DESCRIPTION OF PROCEDURE: The patient was brought back into the procedure room, anesthetized locally, prepped and draped in the usual fashion  Using a #15 blade with a scalpel, the lesion was excised in elliptical fashion  The wound was  undermined in the  fascial plane  Hemostasis was achieved with light electrocoagulation  Purpose of undermining was to decrease wound tension and facilitate closure      The wound was closed with subcutaneous sutures as follows:    Deep suture:3-0 Vicryl      Epidermal edge closure was accomplished with superficial sutures as follows:    Superficial suture: 4-0 Prolene  Superficial suture type: Running,  1 interrupted and 1 pulley stitch    Estimated blood loss less than 3ml  The patient tolerated the procedure well without any complications  The wound was cleaned with sterile saline, dried off, surgical vaseline ointment was applied, and the wound was covered  A pressure dressing was applied for stabilization and light pressure  The patient was given detailed oral and written instructions on postoperative care as detailed in consent  The patient left in good medical condition  Patient to continue her routine 3x/week Azithromycin for her transplant status  She will contact me for any concern of infection immediately  POSTOP DISCUSSION DISCUSSION AND INSTRUCTIONS FOR PATIENT      Rationale for Procedure  A skin excision allows the dermatologist to remove a lesion  The procedure involves a local numbing medication and removing the entire lesion  Typically, the lesion is being removed because it is atypical, traumatized, or for significant appearance reasons  The area will be open like a brush burn and allowed to heal    There will be no sutures  Tissue is sent to pathologist who will reconfirm diagnosis and verify completeness of lesion removal     Description of Procedure  We would like to perform a skin excision today  A local anesthetic, similar to the kind that a dentist uses when filling a cavity, will be injected with a very small needle into the skin area to be sampled  The injected skin and tissue underneath should “go to sleep” and become numbed so that no further pain should be felt  A scalpel will be used to cut around the lesion and tissue will be submitted to pathology for examination  Depending on the diagnosis the lesion will be excised with a certain amount of normal skin to help assure completeness of lesion removal   The physician will discuss in advance the anticipated size and extent of removal    Bleeding will occur, but it will stopped with direct pressure, sutures, and electrocautery  Surgical “Vaseline-type” ointment will also applied after the procedure to help create a barrier between the wound and the outside world  Risks and Potential Complications  The advantage of a skin excision is that it allows us to remove a problem lesion quickly  Although this usually permits the lesion to heal as soon as possible with the least scarring, there are some risks and potential complications that include but are not limited to the following:  - Some bleeding is normal at time of procedure and some bleeding on gauze is normal  the first few days after surgery  Profuse bleeding and bleeding with swelling and pain should be reported as detailed  below  - Infection is uncommon in skin surgery  Infection should be reported and is indicated by pain, redness, and discharge of purulent material   - Some dull to at time sharp pain could occur initially the day after surgery  Persistent pain or increasing pain days after surgery is not expected and should be reported  - Every effort is made to minimize scar, but location, size, and genetics do play a role in scar appearance  A surgical wound does not achieve its optimal appearance until 6 months  There are several treatments available if scarring would be problematic including scar creams, silicone pad, laser and scar revision   - Skin discoloration can occur especially in people of color  Its important to avoid sun on wound in first 6 months after surgery  Treatment is available if pigment is problematic   - Incomplete removal of the lesion or recurrence of lesion can occur and this would then require further treatment and more surgery   - Nerve Damage/Numbness/Loss of Function is very rare, but is most likely to occur if lesion is large or if it is in a high risk location  - Allergic Reaction to lidocaine is rare  More commonly,  epinephrine is used  with the lidocaine    Occasionally, epinephrine (aka adrenalin) may cause a brief  feeling of anxiety or jitteriness  - The person at the microscope  (pathologist) may provide additional information that was unexpected  This unexpected finding could provoke the need for additional treatment or evaluation  What You Will Need to Do After the Procedure  1  Keep the area clean and dry the first day  Try NOT to remove the bandage for the first day  2  Gently clean the area with soap and water and apply Vaseline ointment (this is over the counter and not a prescription) to the excision  site for up to 2 weeks  3  Apply a clean appropriately sized bandage to area  Gauze and paper tape are recommended for sensitive skin  4  Return for suture removal as instructed (generally 1 week for the face, 2 weeks for the body)  5  Take Acetaminophen (Tylenol) for discomfort, if no contraindications  Do NOT take Ibuprofen or aspirin unless specifically told to do so by your Dermatologist because these medications can make bleeding worse  6  Call our office immediately for signs of infection: fever, chills, increased redness, warmth, tenderness, discomfort/pain, or pus or foul smell coming from the wound  If bleeding is noticed, place a clean cloth over the area and apply firm pressure for thirty minutes  Check the wound ONLY after 30 minutes of direct pressure; do not cheat and sneak a peak, as that does not count  If bleeding persists after 30 minutes of legitimate direct pressure, then try one more round of direct pressure for an additional 10 minutes to the area  Should the bleeding become heavier or not stop after the second attempt, call [facility] directly at [phone] (SKIN) or, if after hours, go to your local Emergency Room/Emergency Department        Scribe Attestation    I,:  Aggie Bridges MA am acting as a scribe while in the presence of the attending physician :       I,:  Sherley Bennett MD personally performed the services described in this documentation    as scribed in my presence :

## 2023-03-16 NOTE — PATIENT INSTRUCTIONS
What You Will Need to Do After the Procedure  Keep the area clean and dry the first day  Try NOT to remove the bandage for the first day  Gently clean the area with soap and water and apply Vaseline ointment (this is over the counter and not a prescription) to the excision  site for up to 2 weeks  Apply a clean appropriately sized bandage to area  Gauze and paper tape are recommended for sensitive skin  Return for suture removal as instructed (generally 1 week for the face, 2 weeks for the body)  Take Acetaminophen (Tylenol) for discomfort, if no contraindications  Do NOT take Ibuprofen or aspirin unless specifically told to do so by your Dermatologist because these medications can make bleeding worse  Call our office immediately for signs of infection: fever, chills, increased redness, warmth, tenderness, discomfort/pain, or pus or foul smell coming from the wound  If bleeding is noticed, place a clean cloth over the area and apply firm pressure for thirty minutes  Check the wound ONLY after 30 minutes of direct pressure; do not cheat and sneak a peak, as that does not count  If bleeding persists after 30 minutes of legitimate direct pressure, then try one more round of direct pressure for an additional 10 minutes to the area    Should the bleeding become heavier or not stop after the second attempt, call [facility] Dermatology directly at [phone] (SKIN) or, if after hours, go to your local Emergency Room/Emergency Department

## 2023-03-21 DIAGNOSIS — E10.22 TYPE 1 DIABETES MELLITUS WITH DIABETIC CHRONIC KIDNEY DISEASE, UNSPECIFIED CKD STAGE (HCC): Primary | ICD-10-CM

## 2023-03-21 RX ORDER — INSULIN GLARGINE-YFGN 100 [IU]/ML
0.12 INJECTION, SOLUTION SUBCUTANEOUS
Qty: 3 ML | Refills: 3 | Status: SHIPPED | OUTPATIENT
Start: 2023-03-21 | End: 2023-03-22

## 2023-03-22 RX ORDER — INSULIN GLARGINE-YFGN 100 [IU]/ML
0.12 INJECTION, SOLUTION SUBCUTANEOUS
Qty: 3 ML | Refills: 3 | Status: SHIPPED | OUTPATIENT
Start: 2023-03-22

## 2023-03-22 NOTE — RESULT ENCOUNTER NOTE
Results reviewed showing no residual SCC on excision froml eft thigh, but did have incidental atypical nevus with mild to moderate atypia  Will have patient monitor the site for change or recurrence and follow up if so for exam     Component   Case Report  Surgical Pathology Report                         Case: [id number]                                   Authorizing Provider: Arjun George MD     Collected:           03/16/2023 1146              Ordering Location:     [facility]      Received:            03/16/2023 1147                                   [facility]                                                                Pathologist:           Arjun George MD                                                       Specimen:    Skin, Other, A  Left upper thigh                                                         Final Diagnosis  A  Skin, Left upper thigh, Excision:  No residual squamous cell carcinoma identified  Prior procedure site changes  Incidental focal junctional nevus with mild to moderate atypia and architectural disorder; margins appear free of involvement in examined sections       Electronically signed by Arjun George MD on 3/22/2023 at 10:19 AM  Additional Information   All reported additional testing was performed with appropriately reactive controls   These tests were developed and their performance characteristics determined by [facility] or appropriate performing facility, though some tests may be performed on tissues which have not been validated for performance characteristics (such as staining performed on alcohol exposed cell blocks and decalcified tissues)   Results should be interpreted with caution and in the context of the patients' clinical condition  These tests may not be cleared or approved by the [place]  Food and Drug Administration, though the FDA has determined that such clearance or approval is not necessary   These tests are used for clinical purposes and they should not be regarded as investigational or for research  This laboratory has been approved by [place] [address], designated as a high-complexity laboratory and is qualified to perform these tests  Erroll [facility]anes Description   A  The specimen is received in formalin, labeled with the patient's name and hospital number, and is designated " left upper thigh"  The specimen consists of an unoriented elliptical excision of tan skin with attached fat measuring 4 4 x 1 5 cm and is excised to a depth of 1 1 cm  There is a 0 7 x 0 6 cm tan-pink previous scar that is 0 4 cm away from the nearest peripheral margin  The apparent margin of resection is inked green and the skin surface tips are inked red  The specimen is serially sectioned along the short axis revealing tan cut surfaces  The specimen is entirely submitted with the tips in cassette A1 and the remaining tissue in cassettes A2-A6  A4-A5 contain lesion      Note: The estimated total formalin fixation time based upon information provided by the submitting clinician and the standard processing schedule is over 72 hours    Krista Wang

## 2023-03-27 ENCOUNTER — TELEPHONE (OUTPATIENT)
Dept: GASTROENTEROLOGY | Facility: CLINIC | Age: 48
End: 2023-03-27

## 2023-03-27 NOTE — TELEPHONE ENCOUNTER
lmom confirming pt's TIF scheduled on 3/29/23 at [facility] with Dr Rosalidn Pepper  Informed [facility] would be calling tomorrow with the arrival time  Informed pt to please hold the Eliquis 2 days prior to the procedure and to please call if has any questions about anything

## 2023-03-28 ENCOUNTER — OFFICE VISIT (OUTPATIENT)
Dept: DERMATOLOGY | Facility: CLINIC | Age: 48
End: 2023-03-28

## 2023-03-28 DIAGNOSIS — A49.01 STAPH AUREUS INFECTION: ICD-10-CM

## 2023-03-28 DIAGNOSIS — Z48.02 ENCOUNTER FOR REMOVAL OF SUTURES: Primary | ICD-10-CM

## 2023-03-28 NOTE — PROGRESS NOTES
"Suture removal    Date/Time: 3/28/2023 1:59 PM  Performed by: Jovanni Leslie  Authorized by: Mar Carlson MD   Universal Protocol:  Consent: Verbal consent obtained  Risks and benefits: risks, benefits and alternatives were discussed  Consent given by: patient  Time out: Immediately prior to procedure a \"time out\" was called to verify the correct patient, procedure, equipment, support staff and site/side marked as required  Patient understanding: patient states understanding of the procedure being performed  Patient consent: the patient's understanding of the procedure matches consent given  Procedure consent: procedure consent matches procedure scheduled  Relevant documents: relevant documents not present or verified  Test results: test results not available  Site marked: the operative site was not marked  Radiology Images displayed and confirmed  If images not available, report reviewed: imaging studies not available  Patient identity confirmed: verbally with patient        Patient location:  Clinic  Location:     Laterality:  Left    Location:  Lower extremity (left lateral thigh)  Procedure details: Tools used:  Suture removal kit    Wound appearance:  No sign(s) of infection, clean, pink and good wound healing    Number of sutures removed:  3  Post-procedure details:     Post-removal:  Steri-Strips applied (vaseline applied)  Comments:      Wound culture obtained due to redness per Dr Allyson Campos  Mupirocin ointment ordered  Apply 3 times daily      I examined the patient and noted a presumed suture reaction  There were however some small pustules and tenderness  Wound culture taken  Will start mupirocin TID while we wait for culture results   Will use oral antibiotics prn based on C&S       "

## 2023-03-29 ENCOUNTER — HOSPITAL ENCOUNTER (OUTPATIENT)
Dept: PERIOP | Facility: HOSPITAL | Age: 48
Discharge: HOME | End: 2023-03-29
Attending: INTERNAL MEDICINE

## 2023-03-29 ENCOUNTER — ANESTHESIA EVENT (OUTPATIENT)
Dept: PERIOP | Facility: HOSPITAL | Age: 48
End: 2023-03-29

## 2023-03-29 ENCOUNTER — ANESTHESIA (OUTPATIENT)
Dept: PERIOP | Facility: HOSPITAL | Age: 48
End: 2023-03-29

## 2023-03-29 VITALS
OXYGEN SATURATION: 93 % | SYSTOLIC BLOOD PRESSURE: 123 MMHG | HEART RATE: 77 BPM | WEIGHT: 115 LBS | BODY MASS INDEX: 21.16 KG/M2 | HEIGHT: 62 IN | TEMPERATURE: 98 F | DIASTOLIC BLOOD PRESSURE: 72 MMHG | RESPIRATION RATE: 18 BRPM

## 2023-03-29 DIAGNOSIS — K21.9 GASTROESOPHAGEAL REFLUX DISEASE, UNSPECIFIED WHETHER ESOPHAGITIS PRESENT: ICD-10-CM

## 2023-03-29 DIAGNOSIS — K44.9 HIATAL HERNIA: ICD-10-CM

## 2023-03-29 DIAGNOSIS — B37.81 CANDIDA ESOPHAGITIS (HCC): ICD-10-CM

## 2023-03-29 DIAGNOSIS — K31.84 GASTROPARESIS: Primary | ICD-10-CM

## 2023-03-29 LAB
EXT PREGNANCY TEST URINE: NEGATIVE
EXT. CONTROL: NORMAL
GLUCOSE SERPL-MCNC: 167 MG/DL (ref 65–140)
GLUCOSE SERPL-MCNC: 196 MG/DL (ref 65–140)

## 2023-03-29 RX ORDER — OXYCODONE HYDROCHLORIDE 5 MG/1
10 TABLET ORAL ONCE
Status: COMPLETED | OUTPATIENT
Start: 2023-03-29 | End: 2023-03-29

## 2023-03-29 RX ORDER — LIDOCAINE HYDROCHLORIDE 10 MG/ML
INJECTION, SOLUTION EPIDURAL; INFILTRATION; INTRACAUDAL; PERINEURAL AS NEEDED
Status: DISCONTINUED | OUTPATIENT
Start: 2023-03-29 | End: 2023-03-29

## 2023-03-29 RX ORDER — FLUCONAZOLE 100 MG/1
100 TABLET ORAL DAILY
Qty: 14 TABLET | Refills: 0 | Status: SHIPPED | OUTPATIENT
Start: 2023-03-29 | End: 2023-04-12

## 2023-03-29 RX ORDER — MIDAZOLAM HYDROCHLORIDE 2 MG/2ML
INJECTION, SOLUTION INTRAMUSCULAR; INTRAVENOUS AS NEEDED
Status: DISCONTINUED | OUTPATIENT
Start: 2023-03-29 | End: 2023-03-29

## 2023-03-29 RX ORDER — PROPOFOL 10 MG/ML
INJECTION, EMULSION INTRAVENOUS AS NEEDED
Status: DISCONTINUED | OUTPATIENT
Start: 2023-03-29 | End: 2023-03-29

## 2023-03-29 RX ORDER — EPHEDRINE SULFATE 50 MG/ML
INJECTION INTRAVENOUS AS NEEDED
Status: DISCONTINUED | OUTPATIENT
Start: 2023-03-29 | End: 2023-03-29

## 2023-03-29 RX ORDER — DEXMEDETOMIDINE HYDROCHLORIDE 100 UG/ML
INJECTION, SOLUTION INTRAVENOUS AS NEEDED
Status: DISCONTINUED | OUTPATIENT
Start: 2023-03-29 | End: 2023-03-29

## 2023-03-29 RX ORDER — ONDANSETRON 4 MG/1
4 TABLET, FILM COATED ORAL EVERY 8 HOURS PRN
Qty: 60 TABLET | Refills: 0 | Status: SHIPPED | OUTPATIENT
Start: 2023-03-29

## 2023-03-29 RX ORDER — ROCURONIUM BROMIDE 10 MG/ML
INJECTION, SOLUTION INTRAVENOUS AS NEEDED
Status: DISCONTINUED | OUTPATIENT
Start: 2023-03-29 | End: 2023-03-29

## 2023-03-29 RX ORDER — CEFAZOLIN SODIUM 1 G/3ML
INJECTION, POWDER, FOR SOLUTION INTRAMUSCULAR; INTRAVENOUS AS NEEDED
Status: DISCONTINUED | OUTPATIENT
Start: 2023-03-29 | End: 2023-03-29

## 2023-03-29 RX ORDER — ONDANSETRON 2 MG/ML
INJECTION INTRAMUSCULAR; INTRAVENOUS AS NEEDED
Status: DISCONTINUED | OUTPATIENT
Start: 2023-03-29 | End: 2023-03-29

## 2023-03-29 RX ORDER — SODIUM CHLORIDE, SODIUM LACTATE, POTASSIUM CHLORIDE, CALCIUM CHLORIDE 600; 310; 30; 20 MG/100ML; MG/100ML; MG/100ML; MG/100ML
125 INJECTION, SOLUTION INTRAVENOUS CONTINUOUS
Status: CANCELLED | OUTPATIENT
Start: 2023-03-29

## 2023-03-29 RX ORDER — FENTANYL CITRATE 50 UG/ML
INJECTION, SOLUTION INTRAMUSCULAR; INTRAVENOUS AS NEEDED
Status: DISCONTINUED | OUTPATIENT
Start: 2023-03-29 | End: 2023-03-29

## 2023-03-29 RX ORDER — FENTANYL CITRATE/PF 50 MCG/ML
50 SYRINGE (ML) INJECTION
Status: DISCONTINUED | OUTPATIENT
Start: 2023-03-29 | End: 2023-03-29 | Stop reason: HOSPADM

## 2023-03-29 RX ORDER — SODIUM CHLORIDE, SODIUM LACTATE, POTASSIUM CHLORIDE, CALCIUM CHLORIDE 600; 310; 30; 20 MG/100ML; MG/100ML; MG/100ML; MG/100ML
125 INJECTION, SOLUTION INTRAVENOUS CONTINUOUS
Status: DISCONTINUED | OUTPATIENT
Start: 2023-03-29 | End: 2023-04-02 | Stop reason: HOSPADM

## 2023-03-29 RX ORDER — CEFAZOLIN SODIUM 2 G/50ML
2000 SOLUTION INTRAVENOUS ONCE
Status: DISCONTINUED | OUTPATIENT
Start: 2023-03-29 | End: 2023-03-29 | Stop reason: SURG

## 2023-03-29 RX ADMIN — HYDROCORTISONE SODIUM SUCCINATE 100 MG: 100 INJECTION, POWDER, FOR SOLUTION INTRAMUSCULAR; INTRAVENOUS at 12:02

## 2023-03-29 RX ADMIN — DEXMEDETOMIDINE HCL 8 MCG: 100 INJECTION INTRAVENOUS at 11:45

## 2023-03-29 RX ADMIN — ROCURONIUM BROMIDE 30 MG: 10 INJECTION, SOLUTION INTRAVENOUS at 11:48

## 2023-03-29 RX ADMIN — FENTANYL CITRATE 50 MCG: 50 INJECTION INTRAMUSCULAR; INTRAVENOUS at 13:08

## 2023-03-29 RX ADMIN — EPHEDRINE SULFATE 10 MG: 50 INJECTION, SOLUTION INTRAVENOUS at 11:57

## 2023-03-29 RX ADMIN — OXYCODONE HYDROCHLORIDE 10 MG: 5 TABLET ORAL at 14:04

## 2023-03-29 RX ADMIN — FENTANYL CITRATE 50 MCG: 50 INJECTION INTRAMUSCULAR; INTRAVENOUS at 13:19

## 2023-03-29 RX ADMIN — PROPOFOL 120 MG: 10 INJECTION, EMULSION INTRAVENOUS at 11:47

## 2023-03-29 RX ADMIN — ONDANSETRON 4 MG: 2 INJECTION INTRAMUSCULAR; INTRAVENOUS at 12:27

## 2023-03-29 RX ADMIN — FENTANYL CITRATE 50 MCG: 50 INJECTION, SOLUTION INTRAMUSCULAR; INTRAVENOUS at 11:47

## 2023-03-29 RX ADMIN — LIDOCAINE HYDROCHLORIDE 50 MG: 10 INJECTION, SOLUTION EPIDURAL; INFILTRATION; INTRACAUDAL; PERINEURAL at 11:47

## 2023-03-29 RX ADMIN — SUGAMMADEX 200 MG: 100 INJECTION, SOLUTION INTRAVENOUS at 12:27

## 2023-03-29 RX ADMIN — CEFAZOLIN 1000 MG: 1 INJECTION, POWDER, FOR SOLUTION INTRAMUSCULAR; INTRAVENOUS at 11:52

## 2023-03-29 RX ADMIN — FENTANYL CITRATE 50 MCG: 50 INJECTION, SOLUTION INTRAMUSCULAR; INTRAVENOUS at 12:26

## 2023-03-29 RX ADMIN — MIDAZOLAM 2 MG: 1 INJECTION INTRAMUSCULAR; INTRAVENOUS at 11:42

## 2023-03-29 RX ADMIN — SODIUM CHLORIDE, SODIUM LACTATE, POTASSIUM CHLORIDE, AND CALCIUM CHLORIDE 125 ML/HR: .6; .31; .03; .02 INJECTION, SOLUTION INTRAVENOUS at 11:15

## 2023-03-29 RX ADMIN — DEXMEDETOMIDINE HCL 4 MCG: 100 INJECTION INTRAVENOUS at 12:26

## 2023-03-29 RX ADMIN — DEXMEDETOMIDINE HCL 8 MCG: 100 INJECTION INTRAVENOUS at 12:08

## 2023-03-29 NOTE — ANESTHESIA POSTPROCEDURE EVALUATION
Post-Op Assessment Note    CV Status:  Stable    Pain management: adequate     Mental Status:  Alert and awake   Hydration Status:  Euvolemic   PONV Controlled:  Controlled   Airway Patency:  Patent      Post Op Vitals Reviewed: Yes      Staff: CRNA, Anesthesiologist         No notable events documented      /63 (03/29/23 1243)    Temp 99 °F (37 2 °C) (03/29/23 1243)    Pulse 77 (03/29/23 1243)   Resp 19 (03/29/23 1243)    SpO2 99 % (03/29/23 1243)

## 2023-03-29 NOTE — ANESTHESIA PREPROCEDURE EVALUATION
Procedure:  TRANSORAL INCISIONLESS FUNDOPLICATION (TIF)    Relevant Problems   ANESTHESIA (within normal limits)      CARDIO   (+) DVT (deep venous thrombosis) (HCC)   (+) Essential hypertension   (+) Hypercholesteremia   (+) Rib pain      ENDO   (+) Type 1 diabetes (HCC)      GI/HEPATIC   (+) Esophageal reflux   (+) Hiatal hernia   (+) Pancreatic insufficiency      /RENAL   (+) CKD (chronic kidney disease) stage 2, GFR 60-89 ml/min   (+) Renal calculus      HEMATOLOGY   (+) Acute on chronic anemia      NEURO/PSYCH   (+) Anxiety   (+) Chronic pain disorder      PULMONARY   (+) Asthma        Physical Exam    Airway    Mallampati score: II  TM Distance: >3 FB  Neck ROM: full     Dental   No notable dental hx     Cardiovascular  Rhythm: regular, Rate: normal,     Pulmonary  Breath sounds clear to auscultation,     Other Findings        Anesthesia Plan  ASA Score- 3     Anesthesia Type- general with ASA Monitors  Additional Monitors:   Airway Plan: ETT  Plan Factors-Exercise tolerance (METS): >4 METS  Chart reviewed  EKG reviewed  Existing labs reviewed  Patient summary reviewed  Patient is not a current smoker  Induction- intravenous  Postoperative Plan-   Planned trial extubation    Informed Consent- Anesthetic plan and risks discussed with patient  I personally reviewed this patient with the CRNA  Discussed and agreed on the Anesthesia Plan with the CRNA  Mark Hernandez

## 2023-03-29 NOTE — H&P
History and Physical - [facility]  Rosa Fortune 41 y o  female MRN: [id number]                  HPI: Staci Rubio is a 41y o  year old female who presents for chronic GERD      REVIEW OF SYSTEMS: Per the HPI, and otherwise unremarkable  Historical Information   Past Medical History:   Diagnosis Date   • ABPA (allergic bronchopulmonary aspergillosis) ([facility] [address] ) 07/08/2012   • Acute kidney injury ([facility] [address] ) 09/08/2017   • Acute on chronic respiratory failure ([facility] ) 03/04/2017    Last Assessment & Plan:  Formatting of this note might be different from the original  Recurrent with clinical worsening with enlarging PTX, s/p multiple chest tubes, but new fevers 6/19 and worsening leukocytosis indicating infection as cause of 6/19 decompensation with profound hypoxia in setting of shunting through right lung and continued pneumothorax s/p 2nd chest tube placement  6/27: Right   • Anaphylaxis 06/06/2022   • Asthma    • Chest pain 09/21/2021   • Chronic pain    • Chronic respiratory failure with hypoxia ([facility] ) 09/07/2021   • COVID-19 06/03/2022   • Cystic fibrosis ([facility] [address] )    • Diabetes mellitus ([facility] [address] )    • GERD (gastroesophageal reflux disease)    • HBP (high blood pressure)    • HCAP (healthcare-associated pneumonia) 03/06/2020   • Influenza B 02/23/2020   • Kidney stone    • Neutropenic fever ([facility] ) 09/26/2020   • Pneumonia due to Pseudomonas species ([facility] ) 10/28/2010    Last Assessment & Plan:  Formatting of this note might be different from the original  Polymicrobial pna w/ pseudomonas, M   Abscessus, Cadida glabrata/albicans   • PONV (postoperative nausea and vomiting)    • Pseudomonas aeruginosa infection 09/20/2012   • Squamous cell skin cancer 01/12/2023    SCCIS- Left upper thigh   • Transplant recipient     Lung- 2013, 2017   • Wellness examination 02/04/2013    Formatting of this note might be different from the original  Discussed with patient and acknowledgement form signed on 2/4/2013 (ALYSHA)     Past Surgical History:   Procedure Laterality Date   • APPENDECTOMY     • AUGMENTATION MAMMAPLASTY Bilateral 2004   • AUGMENTATION MAMMAPLASTY Right 2021    removed    • BREAST SURGERY      Aug     • BRONCHOSCOPY     • CHOLECYSTECTOMY     • COLONOSCOPY     • EGD  07/2019    Yeast Infect      • FL RETROGRADE PYELOGRAM  01/09/2020   • FL RETROGRADE PYELOGRAM  02/04/2020   • GASTROSTOMY TUBE, PLACE     • IR BALLOON-OCCLUDED ANTEGRADE TRANSVENOUS OBLITERATION (BATO)  11/22/2022   • IR PORT PLACEMENT  03/22/2022   • IR PORT REMOVAL  01/10/2022   • IR PORT STRIPPING  11/25/2022   • IR TUNNELED CENTRAL LINE PLACEMENT  01/13/2022   • LUNG SURGERY     • LUNG TRANSPLANT      X2   • MN CYSTO BLADDER W/URETERAL CATHETERIZATION Right 01/09/2020    Procedure: CYSTOSCOPY RETROGRADE PYELOGRAM WITH INSERTION STENT URETERAL;  Surgeon: Aggie Mensah MD;  Location: [facility];  Service: Urology   • MN CYSTO/URETERO W/LITHOTRIPSY &INDWELL STENT INSRT Right 02/04/2020    Procedure: CYSTOSCOPY URETEROSCOPY WITH LITHOTRIPSY HOLMIUM LASER, RETROGRADE PYELOGRAM AND INSERTION STENT URETERAL;  Surgeon: Aggie Mensah MD;  Location: [facility];  Service: Urology   • SKIN BIOPSY     • US GUIDED VASCULAR ACCESS  01/17/2017     Social History   Social History     Substance and Sexual Activity   Alcohol Use Yes   • Alcohol/week: 2 0 standard drinks   • Types: 2 Glasses of wine per week    Comment: social     Social History     Substance and Sexual Activity   Drug Use Yes   • Frequency: 7 0 times per week   • Types: Marijuana, Oxycodone    Comment: medical edible marijuana prescribed     Social History     Tobacco Use   Smoking Status Former   • Packs/day: 1 00   • Years: 10 00   • Pack years: 10 00   • Types: Cigarettes   Smokeless Tobacco Never     Family History   Problem Relation Age of Onset   • Alcohol abuse Mother    • Mental illness Mother    • Depression Mother    • Alcohol abuse Father    • COPD Maternal Grandmother "   • Lung cancer Maternal Grandmother 72   • Cancer Maternal Grandmother    • COPD Maternal Grandfather    • Cancer Maternal Grandfather    • COPD Paternal Grandmother    • Lung cancer Paternal Grandmother 72   • No Known Problems Sister    • No Known Problems Paternal Aunt    • Breast cancer Neg Hx    • Ovarian cancer Neg Hx    • Colon cancer Neg Hx        Meds/Allergies     (Not in a hospital admission)      Allergies   Allergen Reactions   • Bebtelovimab Anaphylaxis   • Vancomycin Angioedema and Hives   • Gabapentin Hallucinations   • Ceftazidime Headache     Severe headaches after desensitization   • Nsaids Other (See Comments)     Lung transplant increases risk of renal toxicity, call lung txp provider to discuss if needed       Objective     /73   Pulse 92   Temp 98 7 °F (37 1 °C) (Temporal)   Resp 16   Ht 5' 2\" (1 575 m)   Wt 52 2 kg (115 lb)   SpO2 100%   BMI 21 03 kg/m²       PHYSICAL EXAM    Gen: NAD  CV: RRR  CHEST: Clear  ABD: soft, NT/ND  EXT: no edema      ASSESSMENT/PLAN:  This is a 41y o  year old female here for EGD with TIF , and she is stable and optimized for her procedure        "

## 2023-03-31 LAB
BACTERIA WND AEROBE CULT: ABNORMAL
GRAM STN SPEC: ABNORMAL

## 2023-03-31 RX ORDER — SULFAMETHOXAZOLE AND TRIMETHOPRIM 800; 160 MG/1; MG/1
1 TABLET ORAL EVERY 12 HOURS SCHEDULED
Qty: 20 TABLET | Refills: 0 | Status: SHIPPED | OUTPATIENT
Start: 2023-03-31 | End: 2023-04-10

## 2023-03-31 NOTE — RESULT ENCOUNTER NOTE
Attempted to call patient with culture results  Patient is an immunosuppressed transplant patient with Serratia and Staph infection of surgical excision site  Started Mupriocin, but will switch to Bactrim DS BID x 10days which will cover both  No history of sulfa/Bactrim allergy per chart  Left voicemail on self identified phone  Advised for any signs of allergic reaction, skin pain, blisters, rash, mucous membrane pain, dysuria, dysphagia, etc  To stop med and seek care given risk of SJS/TEN spectrum disease with Bactrim as there are no other good options that cover both bacteria in oral form

## 2023-04-03 DIAGNOSIS — E84.9 CYSTIC FIBROSIS (HCC): ICD-10-CM

## 2023-04-03 RX ORDER — OXYCODONE HYDROCHLORIDE 10 MG/1
10 TABLET ORAL EVERY 8 HOURS PRN
Qty: 90 TABLET | Refills: 0 | Status: SHIPPED | OUTPATIENT
Start: 2023-04-03

## 2023-04-03 RX ORDER — ALPRAZOLAM 1 MG/1
1 TABLET ORAL
Qty: 30 TABLET | Refills: 0 | Status: SHIPPED | OUTPATIENT
Start: 2023-04-03

## 2023-04-03 NOTE — RESULT ENCOUNTER NOTE
Attempted to call patient again regarding culture and need for antibiotics  No answer  Left voicemail for callback

## 2023-04-04 NOTE — RESULT ENCOUNTER NOTE
Again attempted to call patient with culture results and need for antibiotics  Left message on self identified voicemail to check MyChart or call back

## 2023-05-03 ENCOUNTER — APPOINTMENT (EMERGENCY)
Dept: CT IMAGING | Facility: HOSPITAL | Age: 48
End: 2023-05-03

## 2023-05-03 ENCOUNTER — HOSPITAL ENCOUNTER (INPATIENT)
Facility: HOSPITAL | Age: 48
LOS: 2 days | Discharge: HOME HEALTH | End: 2023-05-07
Attending: EMERGENCY MEDICINE | Admitting: FAMILY MEDICINE

## 2023-05-03 ENCOUNTER — APPOINTMENT (EMERGENCY)
Dept: RADIOLOGY | Facility: HOSPITAL | Age: 48
End: 2023-05-03

## 2023-05-03 DIAGNOSIS — E10.22 TYPE 1 DIABETES MELLITUS WITH DIABETIC CHRONIC KIDNEY DISEASE, UNSPECIFIED CKD STAGE (HCC): ICD-10-CM

## 2023-05-03 DIAGNOSIS — Z90.2 S/P PNEUMONECTOMY: ICD-10-CM

## 2023-05-03 DIAGNOSIS — E84.9 CYSTIC FIBROSIS (HCC): ICD-10-CM

## 2023-05-03 DIAGNOSIS — Z94.89 TRANSPLANT RECIPIENT: ICD-10-CM

## 2023-05-03 DIAGNOSIS — R06.02 SHORTNESS OF BREATH: Primary | ICD-10-CM

## 2023-05-03 DIAGNOSIS — R07.89 CHEST PAIN, MUSCULOSKELETAL: ICD-10-CM

## 2023-05-03 DIAGNOSIS — K59.03 DRUG-INDUCED CONSTIPATION: ICD-10-CM

## 2023-05-03 DIAGNOSIS — E87.1 HYPONATREMIA: ICD-10-CM

## 2023-05-03 DIAGNOSIS — J20.9 ACUTE BRONCHITIS, UNSPECIFIED ORGANISM: ICD-10-CM

## 2023-05-03 LAB
ANION GAP SERPL CALCULATED.3IONS-SCNC: 8 MMOL/L (ref 4–13)
ATRIAL RATE: 79 BPM
BASOPHILS # BLD AUTO: 0.01 THOUSANDS/ÂΜL (ref 0–0.1)
BASOPHILS NFR BLD AUTO: 0 % (ref 0–1)
BUN SERPL-MCNC: 22 MG/DL (ref 5–25)
CALCIUM SERPL-MCNC: 9 MG/DL (ref 8.4–10.2)
CARDIAC TROPONIN I PNL SERPL HS: 4 NG/L
CARDIAC TROPONIN I PNL SERPL HS: 5 NG/L
CHLORIDE SERPL-SCNC: 95 MMOL/L (ref 96–108)
CO2 SERPL-SCNC: 24 MMOL/L (ref 21–32)
CREAT SERPL-MCNC: 0.99 MG/DL (ref 0.6–1.3)
EOSINOPHIL # BLD AUTO: 0.06 THOUSAND/ÂΜL (ref 0–0.61)
EOSINOPHIL NFR BLD AUTO: 1 % (ref 0–6)
ERYTHROCYTE [DISTWIDTH] IN BLOOD BY AUTOMATED COUNT: 18.8 % (ref 11.6–15.1)
EXT PREGNANCY TEST URINE: NEGATIVE
EXT. CONTROL: NORMAL
FLUAV RNA RESP QL NAA+PROBE: NEGATIVE
FLUBV RNA RESP QL NAA+PROBE: NEGATIVE
GFR SERPL CREATININE-BSD FRML MDRD: 69 ML/MIN/1.73SQ M
GLUCOSE SERPL-MCNC: 278 MG/DL (ref 65–140)
GLUCOSE SERPL-MCNC: 287 MG/DL (ref 65–140)
HCT VFR BLD AUTO: 33.4 % (ref 34.8–46.1)
HGB BLD-MCNC: 10 G/DL (ref 11.5–15.4)
IMM GRANULOCYTES # BLD AUTO: 0.03 THOUSAND/UL (ref 0–0.2)
IMM GRANULOCYTES NFR BLD AUTO: 0 % (ref 0–2)
LACTATE SERPL-SCNC: 1.8 MMOL/L (ref 0.5–2)
LACTATE SERPL-SCNC: 2.1 MMOL/L (ref 0.5–2)
LYMPHOCYTES # BLD AUTO: 0.89 THOUSANDS/ÂΜL (ref 0.6–4.47)
LYMPHOCYTES NFR BLD AUTO: 12 % (ref 14–44)
MCH RBC QN AUTO: 22.4 PG (ref 26.8–34.3)
MCHC RBC AUTO-ENTMCNC: 29.9 G/DL (ref 31.4–37.4)
MCV RBC AUTO: 75 FL (ref 82–98)
MONOCYTES # BLD AUTO: 0.47 THOUSAND/ÂΜL (ref 0.17–1.22)
MONOCYTES NFR BLD AUTO: 7 % (ref 4–12)
NEUTROPHILS # BLD AUTO: 5.79 THOUSANDS/ÂΜL (ref 1.85–7.62)
NEUTS SEG NFR BLD AUTO: 80 % (ref 43–75)
NRBC BLD AUTO-RTO: 0 /100 WBCS
P AXIS: -15 DEGREES
PLATELET # BLD AUTO: 178 THOUSANDS/UL (ref 149–390)
PMV BLD AUTO: 10.3 FL (ref 8.9–12.7)
POTASSIUM SERPL-SCNC: 4.2 MMOL/L (ref 3.5–5.3)
PR INTERVAL: 94 MS
PROCALCITONIN SERPL-MCNC: 0.05 NG/ML
QRS AXIS: 62 DEGREES
QRSD INTERVAL: 80 MS
QT INTERVAL: 382 MS
QTC INTERVAL: 438 MS
RBC # BLD AUTO: 4.47 MILLION/UL (ref 3.81–5.12)
RSV RNA RESP QL NAA+PROBE: NEGATIVE
SARS-COV-2 RNA RESP QL NAA+PROBE: NEGATIVE
SODIUM SERPL-SCNC: 127 MMOL/L (ref 135–147)
T WAVE AXIS: 76 DEGREES
VENTRICULAR RATE: 79 BPM
WBC # BLD AUTO: 7.25 THOUSAND/UL (ref 4.31–10.16)

## 2023-05-03 RX ORDER — IPRATROPIUM BROMIDE AND ALBUTEROL SULFATE 2.5; .5 MG/3ML; MG/3ML
3 SOLUTION RESPIRATORY (INHALATION)
Status: DISCONTINUED | OUTPATIENT
Start: 2023-05-04 | End: 2023-05-07 | Stop reason: HOSPADM

## 2023-05-03 RX ORDER — INSULIN LISPRO 100 [IU]/ML
9 INJECTION, SOLUTION INTRAVENOUS; SUBCUTANEOUS ONCE
Status: COMPLETED | OUTPATIENT
Start: 2023-05-03 | End: 2023-05-03

## 2023-05-03 RX ORDER — HYDROMORPHONE HCL/PF 1 MG/ML
0.5 SYRINGE (ML) INJECTION ONCE
Status: COMPLETED | OUTPATIENT
Start: 2023-05-03 | End: 2023-05-03

## 2023-05-03 RX ORDER — ZOLPIDEM TARTRATE 5 MG/1
2.5 TABLET ORAL
Status: DISCONTINUED | OUTPATIENT
Start: 2023-05-03 | End: 2023-05-07 | Stop reason: HOSPADM

## 2023-05-03 RX ORDER — HYDROMORPHONE HCL 110MG/55ML
2 PATIENT CONTROLLED ANALGESIA SYRINGE INTRAVENOUS ONCE AS NEEDED
Status: DISCONTINUED | OUTPATIENT
Start: 2023-05-03 | End: 2023-05-03

## 2023-05-03 RX ORDER — ATOVAQUONE 750 MG/5ML
1500 SUSPENSION ORAL DAILY
Status: DISCONTINUED | OUTPATIENT
Start: 2023-05-04 | End: 2023-05-07 | Stop reason: HOSPADM

## 2023-05-03 RX ORDER — MAGNESIUM SULFATE 1 G/100ML
1 INJECTION INTRAVENOUS ONCE
Status: COMPLETED | OUTPATIENT
Start: 2023-05-03 | End: 2023-05-04

## 2023-05-03 RX ORDER — IPRATROPIUM BROMIDE AND ALBUTEROL SULFATE 2.5; .5 MG/3ML; MG/3ML
3 SOLUTION RESPIRATORY (INHALATION)
Status: DISCONTINUED | OUTPATIENT
Start: 2023-05-03 | End: 2023-05-03

## 2023-05-03 RX ORDER — VALACYCLOVIR HYDROCHLORIDE 500 MG/1
500 TABLET, FILM COATED ORAL DAILY
Status: DISCONTINUED | OUTPATIENT
Start: 2023-05-04 | End: 2023-05-03

## 2023-05-03 RX ORDER — HYDROMORPHONE HCL/PF 1 MG/ML
0.5 SYRINGE (ML) INJECTION EVERY 4 HOURS PRN
Status: DISCONTINUED | OUTPATIENT
Start: 2023-05-03 | End: 2023-05-04

## 2023-05-03 RX ORDER — INSULIN LISPRO 100 [IU]/ML
1-5 INJECTION, SOLUTION INTRAVENOUS; SUBCUTANEOUS
Status: DISCONTINUED | OUTPATIENT
Start: 2023-05-04 | End: 2023-05-07 | Stop reason: HOSPADM

## 2023-05-03 RX ORDER — HYDROMORPHONE HCL IN WATER/PF 6 MG/30 ML
0.2 PATIENT CONTROLLED ANALGESIA SYRINGE INTRAVENOUS ONCE
Status: COMPLETED | OUTPATIENT
Start: 2023-05-03 | End: 2023-05-03

## 2023-05-03 RX ORDER — VALACYCLOVIR HYDROCHLORIDE 500 MG/1
500 TABLET, FILM COATED ORAL
Status: DISCONTINUED | OUTPATIENT
Start: 2023-05-03 | End: 2023-05-07 | Stop reason: HOSPADM

## 2023-05-03 RX ORDER — ACETAMINOPHEN 325 MG/1
650 TABLET ORAL EVERY 6 HOURS PRN
Status: DISCONTINUED | OUTPATIENT
Start: 2023-05-03 | End: 2023-05-07 | Stop reason: HOSPADM

## 2023-05-03 RX ORDER — OXYCODONE HYDROCHLORIDE 10 MG/1
10 TABLET ORAL EVERY 6 HOURS PRN
Status: DISCONTINUED | OUTPATIENT
Start: 2023-05-03 | End: 2023-05-04

## 2023-05-03 RX ORDER — DRONABINOL 2.5 MG/1
5 CAPSULE ORAL 3 TIMES DAILY
Status: DISCONTINUED | OUTPATIENT
Start: 2023-05-03 | End: 2023-05-07 | Stop reason: HOSPADM

## 2023-05-03 RX ORDER — LIDOCAINE 50 MG/G
1 PATCH TOPICAL DAILY
Status: DISCONTINUED | OUTPATIENT
Start: 2023-05-04 | End: 2023-05-07 | Stop reason: HOSPADM

## 2023-05-03 RX ORDER — PREDNISONE 10 MG/1
10 TABLET ORAL DAILY
Status: DISCONTINUED | OUTPATIENT
Start: 2023-05-04 | End: 2023-05-07 | Stop reason: HOSPADM

## 2023-05-03 RX ORDER — FAMOTIDINE 20 MG/1
40 TABLET, FILM COATED ORAL DAILY
Status: DISCONTINUED | OUTPATIENT
Start: 2023-05-04 | End: 2023-05-07 | Stop reason: HOSPADM

## 2023-05-03 RX ORDER — ALBUTEROL SULFATE 2.5 MG/3ML
5 SOLUTION RESPIRATORY (INHALATION)
Status: DISCONTINUED | OUTPATIENT
Start: 2023-05-03 | End: 2023-05-03

## 2023-05-03 RX ORDER — ACETAMINOPHEN 325 MG/1
975 TABLET ORAL EVERY 8 HOURS SCHEDULED
Status: DISCONTINUED | OUTPATIENT
Start: 2023-05-03 | End: 2023-05-07 | Stop reason: HOSPADM

## 2023-05-03 RX ORDER — PREGABALIN 75 MG/1
150 CAPSULE ORAL 2 TIMES DAILY
Status: DISCONTINUED | OUTPATIENT
Start: 2023-05-03 | End: 2023-05-07 | Stop reason: HOSPADM

## 2023-05-03 RX ORDER — ALPRAZOLAM 0.5 MG/1
1 TABLET ORAL
Status: DISCONTINUED | OUTPATIENT
Start: 2023-05-03 | End: 2023-05-07 | Stop reason: HOSPADM

## 2023-05-03 RX ORDER — ONDANSETRON 4 MG/1
4 TABLET, ORALLY DISINTEGRATING ORAL EVERY 6 HOURS PRN
Status: DISCONTINUED | OUTPATIENT
Start: 2023-05-03 | End: 2023-05-07 | Stop reason: HOSPADM

## 2023-05-03 RX ORDER — TACROLIMUS 0.5 MG/1
0.5 CAPSULE ORAL 2 TIMES DAILY
Status: DISCONTINUED | OUTPATIENT
Start: 2023-05-03 | End: 2023-05-07 | Stop reason: HOSPADM

## 2023-05-03 RX ORDER — GUAIFENESIN/DEXTROMETHORPHAN 100-10MG/5
5 SYRUP ORAL EVERY 4 HOURS PRN
Status: DISCONTINUED | OUTPATIENT
Start: 2023-05-03 | End: 2023-05-07 | Stop reason: HOSPADM

## 2023-05-03 RX ORDER — HYDRALAZINE HYDROCHLORIDE 20 MG/ML
5 INJECTION INTRAMUSCULAR; INTRAVENOUS EVERY 6 HOURS PRN
Status: DISCONTINUED | OUTPATIENT
Start: 2023-05-03 | End: 2023-05-07 | Stop reason: HOSPADM

## 2023-05-03 RX ORDER — INSULIN GLARGINE 100 [IU]/ML
10 INJECTION, SOLUTION SUBCUTANEOUS
Status: DISCONTINUED | OUTPATIENT
Start: 2023-05-03 | End: 2023-05-07 | Stop reason: HOSPADM

## 2023-05-03 RX ORDER — INSULIN LISPRO 100 [IU]/ML
3 INJECTION, SOLUTION INTRAVENOUS; SUBCUTANEOUS
Status: DISCONTINUED | OUTPATIENT
Start: 2023-05-04 | End: 2023-05-05

## 2023-05-03 RX ORDER — SODIUM CHLORIDE FOR INHALATION 0.9 %
3 VIAL, NEBULIZER (ML) INHALATION ONCE
Status: COMPLETED | OUTPATIENT
Start: 2023-05-03 | End: 2023-05-03

## 2023-05-03 RX ORDER — ALBUTEROL SULFATE 90 UG/1
2 AEROSOL, METERED RESPIRATORY (INHALATION) EVERY 4 HOURS PRN
Status: DISCONTINUED | OUTPATIENT
Start: 2023-05-03 | End: 2023-05-07 | Stop reason: HOSPADM

## 2023-05-03 RX ORDER — SODIUM CHLORIDE 1 G/1
1 TABLET ORAL ONCE
Status: COMPLETED | OUTPATIENT
Start: 2023-05-03 | End: 2023-05-03

## 2023-05-03 RX ADMIN — IPRATROPIUM BROMIDE 1 MG: 0.5 SOLUTION RESPIRATORY (INHALATION) at 15:02

## 2023-05-03 RX ADMIN — PIPERACILLIN AND TAZOBACTAM 3.38 G: 36; 4.5 INJECTION, POWDER, FOR SOLUTION INTRAVENOUS at 16:06

## 2023-05-03 RX ADMIN — DRONABINOL 5 MG: 2.5 CAPSULE ORAL at 22:38

## 2023-05-03 RX ADMIN — ACETAMINOPHEN 650 MG: 325 TABLET ORAL at 21:15

## 2023-05-03 RX ADMIN — PREGABALIN 150 MG: 75 CAPSULE ORAL at 22:38

## 2023-05-03 RX ADMIN — APIXABAN 5 MG: 5 TABLET, FILM COATED ORAL at 22:38

## 2023-05-03 RX ADMIN — INSULIN GLARGINE 10 UNITS: 100 INJECTION, SOLUTION SUBCUTANEOUS at 22:39

## 2023-05-03 RX ADMIN — INSULIN LISPRO 9 UNITS: 100 INJECTION, SOLUTION INTRAVENOUS; SUBCUTANEOUS at 17:59

## 2023-05-03 RX ADMIN — IOHEXOL 100 ML: 350 INJECTION, SOLUTION INTRAVENOUS at 16:29

## 2023-05-03 RX ADMIN — ALBUTEROL SULFATE 10 MG: 2.5 SOLUTION RESPIRATORY (INHALATION) at 15:02

## 2023-05-03 RX ADMIN — MAGNESIUM SULFATE HEPTAHYDRATE 1 G: 1 INJECTION, SOLUTION INTRAVENOUS at 23:07

## 2023-05-03 RX ADMIN — IPRATROPIUM BROMIDE AND ALBUTEROL SULFATE 3 ML: 2.5; .5 SOLUTION RESPIRATORY (INHALATION) at 23:30

## 2023-05-03 RX ADMIN — HYDROMORPHONE HYDROCHLORIDE 0.2 MG: 0.2 INJECTION, SOLUTION INTRAMUSCULAR; INTRAVENOUS; SUBCUTANEOUS at 14:52

## 2023-05-03 RX ADMIN — TACROLIMUS 0.5 MG: 0.5 CAPSULE ORAL at 22:38

## 2023-05-03 RX ADMIN — HYDROMORPHONE HYDROCHLORIDE 0.5 MG: 1 INJECTION, SOLUTION INTRAMUSCULAR; INTRAVENOUS; SUBCUTANEOUS at 15:46

## 2023-05-03 RX ADMIN — HYDROMORPHONE HYDROCHLORIDE 0.5 MG: 1 INJECTION, SOLUTION INTRAMUSCULAR; INTRAVENOUS; SUBCUTANEOUS at 17:37

## 2023-05-03 RX ADMIN — SODIUM CHLORIDE TAB 1 GM 1 G: 1 TAB at 16:34

## 2023-05-03 RX ADMIN — Medication 3 ML: at 15:02

## 2023-05-03 RX ADMIN — VALACYCLOVIR HYDROCHLORIDE 500 MG: 500 TABLET, FILM COATED ORAL at 22:38

## 2023-05-03 RX ADMIN — HYDROMORPHONE HYDROCHLORIDE 0.5 MG: 1 INJECTION, SOLUTION INTRAMUSCULAR; INTRAVENOUS; SUBCUTANEOUS at 23:02

## 2023-05-03 NOTE — PLAN OF CARE
Problem: RESPIRATORY - ADULT  Goal: Achieves optimal ventilation and oxygenation  Description: INTERVENTIONS:  - Assess for changes in respiratory status  - Assess for changes in mentation and behavior  - Position to facilitate oxygenation and minimize respiratory effort  - Oxygen administered by appropriate delivery if ordered  - Initiate smoking cessation education as indicated  - Encourage broncho-pulmonary hygiene including cough, deep breathe, Incentive Spirometry  - Assess the need for suctioning and aspirate as needed  - Assess and instruct to report SOB or any respiratory difficulty  - Respiratory Therapy support as indicated  Outcome: Progressing     Problem: INFECTION - ADULT  Goal: Absence or prevention of progression during hospitalization  Description: INTERVENTIONS:  - Assess and monitor for signs and symptoms of infection  - Monitor lab/diagnostic results  - Monitor all insertion sites, i e  indwelling lines, tubes, and drains  - Monitor endotracheal if appropriate and nasal secretions for changes in amount and color  - Venus appropriate cooling/warming therapies per order  - Administer medications as ordered  - Instruct and encourage patient and family to use good hand hygiene technique  - Identify and instruct in appropriate isolation precautions for identified infection/condition  Outcome: Progressing     Problem: INFECTION - ADULT  Goal: Absence of fever/infection during neutropenic period  Description: INTERVENTIONS:  - Monitor WBC    Outcome: Progressing

## 2023-05-03 NOTE — ED PROVIDER NOTES
History  Chief Complaint   Patient presents with   • Shortness of Breath     Pt reports SOB and chest pain x 3 days, pt is a lung transplant patient with 1 L lung at this time  Pt reports she is coughing and congestion  Cystic fibrosis pt, productive cough/wheezing with green phlegm      Patient is a 51-year-old female with history of cystic fibrosis status post 2 lung transplants, currently with chest left lung with a right pneumonectomy, type 1 diabetes, asthma, prior DVT, pulmonary hypertension who presents today with 3 days of chest pain, shortness of breath and productive cough  Patient states when she gets bronchitis or pneumonia typical symptoms  She used a nebulizer this morning, but continues to have wheezing  The chest pain is always present on the left side of her chest radiating around to the back, however it is worse now with how much she is coughing  Increased with deep breathing also  She presents for evaluation  Prior to Admission Medications   Prescriptions Last Dose Informant Patient Reported? Taking?    ALPRAZolam (XANAX) 1 mg tablet   No No   Sig: Take 1 tablet (1 mg total) by mouth daily at bedtime as needed for anxiety or sleep   BD Pen Needle Abi U/F 32G X 4 MM MISC   No No   Si times daily   Cholecalciferol (Vitamin D3) 125 MCG (5000 UT) TABS   Yes No   Sig: Take 5,000 Units by mouth daily   Continuous Blood Gluc  (Dexcom G6 ) SUKHI   No No   Sig: Use 1 Units continuous   Continuous Blood Gluc Sensor (Dexcom G6 Sensor) MISC   No No   Sig: Use 1 Units every 7 days   Continuous Blood Gluc Transmit (Dexcom G6 Transmitter) MISC   No No   Sig: Use 1 Units continuous   Insulin Glargine Solostar 100 UNIT/ML SOPN   No No   Sig: INJECT 5 UNITS UNDER THE SKIN TWICE DAILY   Insulin Pen Needle (Comfort EZ Pen Needles) 33G X 4 MM MISC   No No   Sig: Use to inject insulin 4 times a day   Respiratory Therapy Supplies (Nebulizer) SUKHI   Yes No   Sig: Please dispense nebulizer machine   Adrianne, sabrinagn, 100 UNIT/ML SOPN   No No   Sig: Inject 0 12 mL (12 Units total) as directed daily at bedtime 12 units hs   acetaminophen (TYLENOL) 325 mg tablet   No No   Sig: Take 3 tablets (975 mg total) by mouth every 8 (eight) hours   albuterol (2 5 mg/3 mL) 0 083 % nebulizer solution   No No   Sig: Take 6 mL (5 mg total) by nebulization 2 (two) times a day   apixaban (Eliquis) 5 mg   No No   Sig: Take 2 tablets (10 mg total) by mouth 2 (two) times a day for 7 days, THEN 1 tablet (5 mg total) 2 (two) times a day for 23 days  atovaquone (MEPRON) 750 mg/5 mL suspension   Yes No   Sig: Take 1,500 mg by mouth daily   azithromycin (ZITHROMAX) 250 mg tablet   Yes No   Sig: Take 250 mg by mouth 3 (three) times a week   dexlansoprazole (DEXILANT) 60 MG capsule   No No   Sig: Take 1 capsule (60 mg total) by mouth in the morning     dronabinol (MARINOL) 5 MG capsule   No No   Sig: TAKE 1 CAPSULE (5 MG TOTAL) BY MOUTH 3 (THREE) TIMES A DAY   eszopiclone (LUNESTA) 1 mg tablet   No No   Sig: Take 1 tablet (1 mg total) by mouth daily at bedtime as needed for sleep Take immediately before bedtime   famotidine (PEPCID) 40 MG tablet   No No   Sig: TAKE 1 TABLET BY MOUTH EVERY DAY   guaifenesin-codeine (GUAIFENESIN AC) 100-10 MG/5ML liquid   No No   Sig: Take 5 mL by mouth 3 (three) times a day as needed for cough   Patient not taking: Reported on 2/27/2023   insulin aspart (NovoLOG FlexPen) 100 UNIT/ML injection pen   No No   Sig: Inject 4 units three times a day with meals   Patient taking differently: Inject according to sliding scale 5 am 7 lunch and 9 at dinner and ss for greater than 150   medroxyPROGESTERone (DEPO-PROVERA) 150 mg/mL injection   No No   Sig: Inject 1 mL (150 mg total) into a muscle every 3 (three) months   metoclopramide (Reglan) 10 mg tablet   No No   Sig: Take 1 tablet (10 mg total) by mouth 3 (three) times a day   mupirocin (BACTROBAN) 2 % ointment   No No   Sig: Apply topically 3 (three) times a day   naloxone (NARCAN) 4 mg/0 1 mL nasal spray   Yes No   Si mg into each nostril   ondansetron (ZOFRAN) 4 mg tablet   No No   Sig: Take 1 tablet (4 mg total) by mouth every 8 (eight) hours as needed for nausea or vomiting   oxyCODONE (ROXICODONE) 10 MG TABS   No No   Sig: Take 1 tablet (10 mg total) by mouth every 8 (eight) hours as needed for severe pain Max Daily Amount: 30 mg   pancrelipase, Lip-Prot-Amyl, (CREON) 24,000 units   Yes No   Sig: Take 48,000 Units by mouth 3 (three) times a day with meals   predniSONE 10 mg tablet   Yes No   Sig: Take 10 mg by mouth daily   pregabalin (LYRICA) 150 mg capsule   No No   Sig: Take 1 capsule (150 mg total) by mouth 2 (two) times a day   sodium chloride (OCEAN) 0 65 % nasal spray   No No   Si sprays into each nostril 2 (two) times a day   tacrolimus (PROGRAF) 0 5 mg capsule   Yes No   tacrolimus (PROGRAF) 1 mg capsule   Yes No   Sig: Take 0 5 mg by mouth 2 (two) times a day   valACYclovir (VALTREX) 500 mg tablet   Yes No   Sig: Take 500 mg by mouth daily   vitamin B-12 (VITAMIN B-12) 1,000 mcg tablet   Yes No   Sig: Take by mouth daily      Facility-Administered Medications: None       Past Medical History:   Diagnosis Date   • ABPA (allergic bronchopulmonary aspergillosis) ([facility] ) 2012   • Acute kidney injury ([facility] ) 2017   • Acute on chronic respiratory failure ([facility] ) 2017    Last Assessment & Plan:  Formatting of this note might be different from the original  Recurrent with clinical worsening with enlarging PTX, s/p multiple chest tubes, but new fevers  and worsening leukocytosis indicating infection as cause of  decompensation with profound hypoxia in setting of shunting through right lung and continued pneumothorax s/p 2nd chest tube placement    : Right   • Anaphylaxis 2022   • Asthma    • Chest pain 2021   • Chronic pain    • Chronic respiratory failure with hypoxia ([facility] ) 2021   • COVID-19 06/03/2022   • Cystic fibrosis ([facility] )    • Diabetes mellitus ([facility] )    • GERD (gastroesophageal reflux disease)    • HBP (high blood pressure)    • HCAP (healthcare-associated pneumonia) 03/06/2020   • Influenza B 02/23/2020   • Kidney stone    • Neutropenic fever ([facility] ) 09/26/2020   • Pneumonia due to Pseudomonas species ([facility] ) 10/28/2010    Last Assessment & Plan:  Formatting of this note might be different from the original  Polymicrobial pna w/ pseudomonas, M  Abscessus, Cadida glabrata/albicans   • PONV (postoperative nausea and vomiting)    • Pseudomonas aeruginosa infection 09/20/2012   • Squamous cell skin cancer 01/12/2023    SCCIS- Left upper thigh   • Transplant recipient     Lung- 2013, 2017   • Wellness examination 02/04/2013    Formatting of this note might be different from the original  Discussed with patient and acknowledgement form signed on 2/4/2013 (ALYSHA)       Past Surgical History:   Procedure Laterality Date   • APPENDECTOMY     • AUGMENTATION MAMMAPLASTY Bilateral 2004   • AUGMENTATION MAMMAPLASTY Right 2021    removed    • BREAST SURGERY      Aug     • BRONCHOSCOPY     • CHOLECYSTECTOMY     • COLONOSCOPY     • EGD  07/2019    Yeast Infect      • FL RETROGRADE PYELOGRAM  01/09/2020   • FL RETROGRADE PYELOGRAM  02/04/2020   • GASTROSTOMY TUBE, PLACE     • IR BALLOON-OCCLUDED ANTEGRADE TRANSVENOUS OBLITERATION (BATO)  11/22/2022   • IR PORT PLACEMENT  03/22/2022   • IR PORT REMOVAL  01/10/2022   • IR PORT STRIPPING  11/25/2022   • IR TUNNELED CENTRAL LINE PLACEMENT  01/13/2022   • LUNG SURGERY     • LUNG TRANSPLANT      X2   • CT CYSTO BLADDER W/URETERAL CATHETERIZATION Right 01/09/2020    Procedure: CYSTOSCOPY RETROGRADE PYELOGRAM WITH INSERTION STENT URETERAL;  Surgeon: Melissa Grigsby MD;  Location: [facility];  Service: Urology   • CT CYSTO/URETERO W/LITHOTRIPSY &INDWELL STENT INSRT Right 02/04/2020    Procedure: CYSTOSCOPY URETEROSCOPY WITH LITHOTRIPSY HOLMIUM LASER, RETROGRADE PYELOGRAM AND INSERTION STENT URETERAL;  Surgeon: Benjamin Guardado MD;  Location: [facility];  Service: Urology   • SKIN BIOPSY     • US GUIDED VASCULAR ACCESS  01/17/2017       Family History   Problem Relation Age of Onset   • Alcohol abuse Mother    • Mental illness Mother    • Depression Mother    • Alcohol abuse Father    • COPD Maternal Grandmother    • Lung cancer Maternal Grandmother 72   • Cancer Maternal Grandmother    • COPD Maternal Grandfather    • Cancer Maternal Grandfather    • COPD Paternal Grandmother    • Lung cancer Paternal Grandmother 72   • No Known Problems Sister    • No Known Problems Paternal Aunt    • Breast cancer Neg Hx    • Ovarian cancer Neg Hx    • Colon cancer Neg Hx      I have reviewed and agree with the history as documented  E-Cigarette/Vaping   • E-Cigarette Use Never User      E-Cigarette/Vaping Substances     Social History     Tobacco Use   • Smoking status: Former     Packs/day: 1 00     Years: 10 00     Pack years: 10 00     Types: Cigarettes   • Smokeless tobacco: Never   Vaping Use   • Vaping Use: Never used   Substance Use Topics   • Alcohol use: Yes     Alcohol/week: 2 0 standard drinks     Types: 2 Glasses of wine per week     Comment: social   • Drug use: Yes     Frequency: 7 0 times per week     Types: Marijuana, Oxycodone     Comment: medical edible marijuana prescribed       Review of Systems   Constitutional: Negative for appetite change, chills, diaphoresis and fever  HENT: Positive for congestion and rhinorrhea  Negative for drooling, ear pain, sore throat and voice change  Respiratory: Positive for cough, chest tightness, shortness of breath and wheezing  Negative for stridor  Cardiovascular: Positive for chest pain  Negative for palpitations and leg swelling  Gastrointestinal: Negative for abdominal pain, constipation, diarrhea, nausea and vomiting  Genitourinary: Negative for dysuria  Musculoskeletal: Negative for gait problem     Skin: Negative for color change, pallor and rash  Neurological: Negative for dizziness, syncope, facial asymmetry, weakness, light-headedness and headaches  All other systems reviewed and are negative  Physical Exam  Physical Exam  Vitals and nursing note reviewed  Constitutional:       General: She is awake  She is in acute distress  Appearance: Normal appearance  She is ill-appearing  She is not toxic-appearing or diaphoretic  HENT:      Head: Normocephalic and atraumatic  Right Ear: External ear normal       Left Ear: External ear normal       Nose: Nose normal       Mouth/Throat:      Lips: Pink  Mouth: Mucous membranes are moist    Eyes:      General: Lids are normal  Gaze aligned appropriately  No scleral icterus  Right eye: No discharge  Left eye: No discharge  Cardiovascular:      Rate and Rhythm: Normal rate and regular rhythm  Pulses:           Radial pulses are 2+ on the right side and 2+ on the left side  Heart sounds: Normal heart sounds  Comments: Cardiac sounds shifted to the R d/t R pneumonectomy  Pulmonary:      Effort: Respiratory distress present  No retractions  Breath sounds: Examination of the left-upper field reveals wheezing and rhonchi  Examination of the left-middle field reveals wheezing and rhonchi  Examination of the left-lower field reveals wheezing and rhonchi  Wheezing and rhonchi present  Comments: Pt had two lung transplants in the past   Currently with right pneumonectomy  L lung has wheezing and rhonchi throughout  Rhonchi cleared with cough  Painful L chest wall when coughing and deep breathing  Abdominal:      General: Abdomen is flat  Bowel sounds are normal       Palpations: Abdomen is soft  Tenderness: There is no abdominal tenderness  Musculoskeletal:         General: No tenderness, deformity or signs of injury        Cervical back: Full passive range of motion without pain, normal range of motion and neck supple  Right lower leg: No edema  Left lower leg: No edema  Skin:     General: Skin is warm and dry  Capillary Refill: Capillary refill takes less than 2 seconds  Coloration: Skin is not cyanotic, jaundiced or mottled  Findings: No erythema or rash  Neurological:      General: No focal deficit present  Mental Status: She is alert and oriented to person, place, and time  Mental status is at baseline  GCS: GCS eye subscore is 4  GCS verbal subscore is 5  GCS motor subscore is 6  Gait: Gait normal    Psychiatric:         Mood and Affect: Mood normal          Behavior: Behavior normal  Behavior is cooperative  Thought Content:  Thought content normal          Vital Signs  ED Triage Vitals   Temperature Pulse Respirations Blood Pressure SpO2   05/03/23 1338 05/03/23 1338 05/03/23 1338 05/03/23 1338 05/03/23 1338   97 8 °F (36 6 °C) 81 16 (!) 207/82 100 %      Temp src Heart Rate Source Patient Position - Orthostatic VS BP Location FiO2 (%)   -- 05/03/23 1338 05/03/23 1338 05/03/23 1338 --    Monitor Sitting Left arm       Pain Score       05/03/23 1452       7           Vitals:    05/03/23 1400 05/03/23 1500 05/03/23 1600 05/03/23 1700   BP: (!) 187/96 154/95 126/74 140/80   Pulse: 80 73 84 90   Patient Position - Orthostatic VS:             Visual Acuity      ED Medications  Medications   insulin lispro (HumaLOG) 100 units/mL subcutaneous injection 9 Units (has no administration in time range)   albuterol inhalation solution 10 mg (10 mg Nebulization Given 5/3/23 1502)     And   ipratropium (ATROVENT) 0 02 % inhalation solution 1 mg (1 mg Nebulization Given 5/3/23 1502)     And   sodium chloride 0 9 % inhalation solution 3 mL (3 mL Nebulization Given 5/3/23 1502)   HYDROmorphone HCl (DILAUDID) injection 0 2 mg (0 2 mg Intravenous Given 5/3/23 1452)   HYDROmorphone (DILAUDID) injection 0 5 mg (0 5 mg Intravenous Given 5/3/23 1546)   piperacillin-tazobactam (ZOSYN) 3 375 g in sodium chloride 0 9 % 100 mL IVPB (0 g Intravenous Stopped 5/3/23 1704)   sodium chloride tablet 1 g (1 g Oral Given 5/3/23 1634)   iohexol (OMNIPAQUE) 350 MG/ML injection (SINGLE-DOSE) 100 mL (100 mL Intravenous Given 5/3/23 1629)   HYDROmorphone (DILAUDID) injection 0 5 mg (0 5 mg Intravenous Given 5/3/23 1737)       Diagnostic Studies  Results Reviewed     Procedure Component Value Units Date/Time    POCT pregnancy, urine [[id number]]  (Normal) Resulted: 05/03/23 1746    Lab Status: Final result Updated: 05/03/23 1747     EXT Preg Test, Ur Negative     Control Valid    Sputum culture and Gram stain [[id number]]     Lab Status: No result Specimen: Sputum     Lactic acid, plasma (w/reflex if result > 2 0) [[id number]]  (Abnormal) Collected: 05/03/23 1429    Lab Status: Final result Specimen: Blood from Central Venous Line Updated: 05/03/23 1538     LACTIC ACID 2 1 mmol/L     Narrative:      Result may be elevated if tourniquet was used during collection  Lactic acid 2 Hours [[id number]]     Lab Status: No result Specimen: Blood     FLU/RSV/COVID - if FLU/RSV clinically relevant [[id number]]  (Normal) Collected: 05/03/23 1429    Lab Status: Final result Specimen: Nares from Nose Updated: 05/03/23 1537     SARS-CoV-2 Negative     INFLUENZA A PCR Negative     INFLUENZA B PCR Negative     RSV PCR Negative    Narrative:      FOR PEDIATRIC PATIENTS - copy/paste COVID Guidelines URL to browser: [email or web address]    SARS-CoV-2 assay is a Nucleic Acid Amplification assay intended for the  qualitative detection of nucleic acid from SARS-CoV-2 in nasopharyngeal  swabs  Results are for the presumptive identification of SARS-CoV-2 RNA  Positive results are indicative of infection with SARS-CoV-2, the virus  causing COVID-19, but do not rule out bacterial infection or co-infection  with other viruses   Laboratories within the [place] and its  territories are required to report all positive results to the appropriate  public health authorities  Negative results do not preclude SARS-CoV-2  infection and should not be used as the sole basis for treatment or other  patient management decisions  Negative results must be combined with  clinical observations, patient history, and epidemiological information  This test has not been FDA cleared or approved  This test has been authorized by FDA under an Emergency Use Authorization  (EUA)  This test is only authorized for the duration of time the  declaration that circumstances exist justifying the authorization of the  emergency use of an in vitro diagnostic tests for detection of SARS-CoV-2  virus and/or diagnosis of COVID-19 infection under section 564(b)(1) of  the Act, 21 U  S C  060JMA-3(G)(8), unless the authorization is terminated  or revoked sooner  The test has been validated but independent review by FDA  and CLIA is pending  Test performed using [employer] GeneXpert: This RT-PCR assay targets N2,  a region unique to SARS-CoV-2  A conserved region in the E-gene was chosen  for pan-Sarbecovirus detection which includes SARS-CoV-2  According to CMS-2020-01-R, this platform meets the definition of high-throughput technology      Basic metabolic panel [[id number]]  (Abnormal) Collected: 05/03/23 1429    Lab Status: Final result Specimen: Blood from Central Venous Line Updated: 05/03/23 1513     Sodium 127 mmol/L      Potassium 4 2 mmol/L      Chloride 95 mmol/L      CO2 24 mmol/L      ANION GAP 8 mmol/L      BUN 22 mg/dL      Creatinine 0 99 mg/dL      Glucose 278 mg/dL      Calcium 9 0 mg/dL      eGFR 69 ml/min/1 73sq m     Narrative:      [place] guidelines for Chronic Kidney Disease (CKD):   •  Stage 1 with normal or high GFR (GFR > 90 mL/min/1 73 square meters)  •  Stage 2 Mild CKD (GFR = 60-89 mL/min/1 73 square meters)  •  Stage 3A Moderate CKD (GFR = 45-59 mL/min/1 73 square meters)  • Stage 3B Moderate CKD (GFR = 30-44 mL/min/1 73 square meters)  •  Stage 4 Severe CKD (GFR = 15-29 mL/min/1 73 square meters)  •  Stage 5 End Stage CKD (GFR <15 mL/min/1 73 square meters)  Note: GFR calculation is accurate only with a steady state creatinine    HS Troponin 0hr (reflex protocol) [[id number]]  (Normal) Collected: 05/03/23 1429    Lab Status: Final result Specimen: Blood from Central Venous Line Updated: 05/03/23 1500     hs TnI 0hr 4 ng/L     Blood culture #1 [[id number]] Collected: 05/03/23 1444    Lab Status: In process Specimen: Blood from Arm, Right Updated: 05/03/23 1449    Blood culture #2 [[id number]] Collected: 05/03/23 1446    Lab Status: In process Specimen: Blood from Hand, Right Updated: 05/03/23 1449    CBC and differential [[id number]]  (Abnormal) Collected: 05/03/23 1429    Lab Status: Final result Specimen: Blood from Central Venous Line Updated: 05/03/23 1436     WBC 7 25 Thousand/uL      RBC 4 47 Million/uL      Hemoglobin 10 0 g/dL      Hematocrit 33 4 %      MCV 75 fL      MCH 22 4 pg      MCHC 29 9 g/dL      RDW 18 8 %      MPV 10 3 fL      Platelets 274 Thousands/uL      nRBC 0 /100 WBCs      Neutrophils Relative 80 %      Immat GRANS % 0 %      Lymphocytes Relative 12 %      Monocytes Relative 7 %      Eosinophils Relative 1 %      Basophils Relative 0 %      Neutrophils Absolute 5 79 Thousands/µL      Immature Grans Absolute 0 03 Thousand/uL      Lymphocytes Absolute 0 89 Thousands/µL      Monocytes Absolute 0 47 Thousand/µL      Eosinophils Absolute 0 06 Thousand/µL      Basophils Absolute 0 01 Thousands/µL                  CTA ED chest PE study   Final Result by Selvin Sanford MD (05/03 1659)      No pulmonary embolus  Left lung transplant with tiny focus of groundglass opacity in the left upper lobe, of doubtful significance  Dilated fluid-filled esophagus, of uncertain etiology              Workstation performed: [id number]         XR chest 1 view portable   ED Interpretation by Carolyn Bassett PA-C (05/03 1515)   Right pneumonectomy present with associated shift of heart and trachea to the right  L lung costophrenic angle without blunting, no effusion present, no pneumothorax  Final Result by Mattie Acuna MD (05/03 1650)      Right pneumonectomy  No acute cardiopulmonary disease otherwise  Workstation performed: [id number]                    Procedures  ECG 12 Lead Documentation Only    Date/Time: 5/3/2023 5:09 PM  Performed by: Tri Abraham PA-C  Authorized by: Carolyn Bassett PA-C     Indications / Diagnosis:  SOB, CP  ECG reviewed by me, the ED Provider: yes    Patient location:  ED  Previous ECG:     Previous ECG:  Compared to current    Similarity:  Changes noted    Comparison to cardiac monitor: Yes    Interpretation:     Interpretation: normal    Rate:     ECG rate:  79    ECG rate assessment: normal    Rhythm:     Rhythm: sinus rhythm    Ectopy:     Ectopy: none    QRS:     QRS axis:  Normal    QRS intervals:  Normal  Conduction:     Conduction: normal    ST segments:     ST segments:  Normal  T waves:     T waves: normal               ED Course  ED Course as of 05/03/23 1758   Wed May 03, 2023   1438 WBC: 7 25   1438 Hemoglobin(!): 10 0  At baseline   1438 HCT(!): 33 4   1502 hs TnI 0hr: 4   1515 Potassium: 4 2   1515 Discussed labs so far  Pt has a low sodium  Denies drinking excessive fluids, but pt had surgery for heart burn a month ago and she has not resumed bread or meat as of yet  Pt not having neurologic symptoms  Can be treated outpatient with salt tablets  1544 LACTIC ACID(!!): 2 1   1548 SARS-COV-2: Negative   1548 INFLU A PCR: Negative   1548 INFLU B PCR: Negative   1548 RSV PCR: Negative   1553 Case discussed with WAYNE Bell, from [address] [place] [address] Transplant Program   She is very familiar with the patient's case    Her contact information is [phone] at the office and personal cell is [phone]  She recommended admission at least overnight for observation as well as CT chest to r/o PE and PNA not seen on x-ray  Discussed antibiotics, she recommended Candice  Not concerned about MRSA at this time  Would do sputum culture also  Pt may be transferred to [facility] if she has a preference to do so  Recs discussed with patient  Pt states she would prefer to stay here and not be transferred  1652 Pt has some improvement of her symptoms with pain medicine  Lung sounds improved (L lung), but still coarse  Pt's vitals stable  Awaiting CT results  1704 CT negative for PE, no significant findings for PNA  TT sent to [facility] regarding admit to obs per Lung transplant provider recommendations  SBIRT 22yo+    Flowsheet Row Most Recent Value   Initial Alcohol Screen: US AUDIT-C     1  How often do you have a drink containing alcohol? 0 Filed at: 05/03/2023 1505   2  How many drinks containing alcohol do you have on a typical day you are drinking? 0 Filed at: 05/03/2023 1505   3a  Male UNDER 65: How often do you have five or more drinks on one occasion? 0 Filed at: 05/03/2023 1505   3b  FEMALE Any Age, or MALE 65+: How often do you have 4 or more drinks on one occassion? 0 Filed at: 05/03/2023 1505   Audit-C Score 0 Filed at: 05/03/2023 1505   NICOLE: How many times in the past year have you    Used an illegal drug or used a prescription medication for non-medical reasons? Never Filed at: 05/03/2023 1505                    Medical Decision Making  [facility]      Patient with history as above presented with cough, congestion, SOB and chest pain x 3 days  History obtained from patient  UTD on vaccinations including COVID  Patient was nontoxic, stable  Ambulatory  Exam as above  Differential diagnosis is bronchitis, pneumonia, ACS, sepsis  Will obtain CBC, BMP, BlClx x 2, troponin, lactate, and covid/flu/rsv  Obtain CXR    Ordered [facility] for wheezing, nursing notifying respiratory  Pt states if she needs to be admitted, she would like her transplant team notified because they usually want her transferred to their facility for treatment  EKG reviewed  NSR, no ectopy  Labs reviewed  Pt's sodium is low at 127 (possibly dietary vs SIADH due to her lung pathology)  No leukocytosis  Independently reviewed imaging  Right pneumonectomy present with associated shift of heart and trachea to the right  L lung costophrenic angle without blunting, no effusion present, no pneumothorax  Reviewed external records  Pt's symptoms not improved with PONCE neb  Discussed case with WAYNE Reyes, from [address] [place] [address] transplant program   Ms Gladis Rodríguez states the patient tends to get sick very quickly and would recommend admission to at least observation a night or two  She also would be concerned for PE  Pt has had PE in the past, currently on Eliquis  Will order CT PE study as this will also assess for pneumonia that was not seen on CXR  CT chest was negative for PE and PNA  However, patient continuing to have rhonchi on exam and feels poorly  Discussion with Ben Tavera from [facility] regarding results  Pt has been on ECMO in the past and multiple episodes of fast decompensation and still would like pt admitted to observation, continue Zosyn, and pain control as well as repeat labs for her hyponatremia  Discussed case with Dr Shawn Kraus and he accepts the patient to observation status for shortness of breath, hyponatremia, musculoskeletal chest pain, cystic fibrosis s/p transplant and pneumonectomy      Chest pain, musculoskeletal: acute illness or injury  Cystic fibrosis ([facility] [address] ): chronic illness or injury  Hyponatremia: acute illness or injury  S/P pneumonectomy: chronic illness or injury  Shortness of breath: acute illness or injury  Transplant recipient: chronic illness or injury  Type 1 diabetes mellitus with diabetic chronic kidney disease, unspecified CKD stage [facility]): chronic illness or injury  Amount and/or Complexity of Data Reviewed  External Data Reviewed: labs, radiology, ECG and notes  Labs: ordered  Radiology: ordered  Decision-making details documented in ED Course  ECG/medicine tests: ordered and independent interpretation performed  Decision-making details documented in ED Course  Risk  OTC drugs  Prescription drug management  Decision regarding hospitalization            Disposition  Final diagnoses:   Cystic fibrosis ([place] [address] )   Chest pain, musculoskeletal   S/P pneumonectomy   Type 1 diabetes mellitus with diabetic chronic kidney disease, unspecified CKD stage ([place] [address] )   Hyponatremia   Transplant recipient   Shortness of breath     Time reflects when diagnosis was documented in both MDM as applicable and the Disposition within this note     Time User Action Codes Description Comment    5/3/2023  5:35 PM En [place], April Add [R06 02] SOB (shortness of breath)     5/3/2023  5:36 PM En [place], April Add [E84 9] Cystic fibrosis ([place] [address] )     5/3/2023  5:40 PM [place], April Add [R07 89] Chest pain, musculoskeletal     5/3/2023  5:41 PM [place], April Add [Z90 2] S/P pneumonectomy     5/3/2023  5:41 PM [place], April Add [E10 22] Type 1 diabetes mellitus with diabetic chronic kidney disease, unspecified CKD stage ([place] [address] )     5/3/2023  5:41 PM [place], April Add [E87 1] Hyponatremia     5/3/2023  5:54 PM [place], April Add [Z94 89] Transplant recipient     5/3/2023  5:54 PM [place], April Modify [E84 9] Cystic fibrosis ([place] [address] )     5/3/2023  5:54 PM [place], April Remove [R06 02] SOB (shortness of breath)     5/3/2023  5:55 PM [place], April Add [R06 02] Shortness of breath     5/3/2023  5:55 PM Bert April Modify [E84 9] Cystic fibrosis ([place] [address] )     5/3/2023  5:55 PM En [place], April Modify [R06 02] Shortness of breath       ED Disposition     ED Disposition   Admit    Condition   Stable    Date/Time   Wed May 3, 2023  5:35 PM    Comment   Case was discussed with KORY and the patient's admission status was agreed to be Admission Status: observation status to the service of Dr Tari Corona   Follow-up Information    None         Patient's Medications   Discharge Prescriptions    No medications on file       No discharge procedures on file      PDMP Review       Value Time User    PDMP Reviewed  Yes 4/3/2023 10:33 AM Thuy Bravo MD          ED Provider  Electronically Signed by           Carolyn Naik PA-C  05/03/23 5606

## 2023-05-04 LAB
2HR DELTA HS TROPONIN: 0 NG/L
4HR DELTA HS TROPONIN: 1 NG/L
ANION GAP SERPL CALCULATED.3IONS-SCNC: 4 MMOL/L (ref 4–13)
BUN SERPL-MCNC: 21 MG/DL (ref 5–25)
CALCIUM SERPL-MCNC: 8.7 MG/DL (ref 8.4–10.2)
CARDIAC TROPONIN I PNL SERPL HS: 5 NG/L
CARDIAC TROPONIN I PNL SERPL HS: 6 NG/L
CHLORIDE SERPL-SCNC: 101 MMOL/L (ref 96–108)
CO2 SERPL-SCNC: 27 MMOL/L (ref 21–32)
CREAT SERPL-MCNC: 0.9 MG/DL (ref 0.6–1.3)
ERYTHROCYTE [DISTWIDTH] IN BLOOD BY AUTOMATED COUNT: 19.6 % (ref 11.6–15.1)
GFR SERPL CREATININE-BSD FRML MDRD: 77 ML/MIN/1.73SQ M
GLUCOSE P FAST SERPL-MCNC: 119 MG/DL (ref 65–99)
GLUCOSE SERPL-MCNC: 119 MG/DL (ref 65–140)
GLUCOSE SERPL-MCNC: 120 MG/DL (ref 65–140)
GLUCOSE SERPL-MCNC: 120 MG/DL (ref 65–140)
GLUCOSE SERPL-MCNC: 136 MG/DL (ref 65–140)
GLUCOSE SERPL-MCNC: 249 MG/DL (ref 65–140)
GLUCOSE SERPL-MCNC: 282 MG/DL (ref 65–140)
GLUCOSE SERPL-MCNC: 315 MG/DL (ref 65–140)
HCT VFR BLD AUTO: 30.3 % (ref 34.8–46.1)
HGB BLD-MCNC: 9 G/DL (ref 11.5–15.4)
MAGNESIUM SERPL-MCNC: 2.1 MG/DL (ref 1.9–2.7)
MCH RBC QN AUTO: 22.2 PG (ref 26.8–34.3)
MCHC RBC AUTO-ENTMCNC: 29.7 G/DL (ref 31.4–37.4)
MCV RBC AUTO: 75 FL (ref 82–98)
PLATELET # BLD AUTO: 176 THOUSANDS/UL (ref 149–390)
PMV BLD AUTO: 10.2 FL (ref 8.9–12.7)
POTASSIUM SERPL-SCNC: 4.4 MMOL/L (ref 3.5–5.3)
PROCALCITONIN SERPL-MCNC: 0.07 NG/ML
RBC # BLD AUTO: 4.06 MILLION/UL (ref 3.81–5.12)
SODIUM SERPL-SCNC: 132 MMOL/L (ref 135–147)
WBC # BLD AUTO: 4.81 THOUSAND/UL (ref 4.31–10.16)

## 2023-05-04 RX ORDER — OXYCODONE HYDROCHLORIDE 10 MG/1
10 TABLET ORAL EVERY 4 HOURS PRN
Status: DISCONTINUED | OUTPATIENT
Start: 2023-05-04 | End: 2023-05-07 | Stop reason: HOSPADM

## 2023-05-04 RX ORDER — HYDROMORPHONE HCL/PF 1 MG/ML
1 SYRINGE (ML) INJECTION EVERY 4 HOURS PRN
Status: DISCONTINUED | OUTPATIENT
Start: 2023-05-04 | End: 2023-05-07

## 2023-05-04 RX ADMIN — DRONABINOL 5 MG: 2.5 CAPSULE ORAL at 22:03

## 2023-05-04 RX ADMIN — HYDROMORPHONE HYDROCHLORIDE 0.5 MG: 1 INJECTION, SOLUTION INTRAMUSCULAR; INTRAVENOUS; SUBCUTANEOUS at 10:54

## 2023-05-04 RX ADMIN — DRONABINOL 5 MG: 2.5 CAPSULE ORAL at 15:27

## 2023-05-04 RX ADMIN — OXYCODONE HYDROCHLORIDE 15 MG: 5 TABLET ORAL at 17:45

## 2023-05-04 RX ADMIN — HYDROMORPHONE HYDROCHLORIDE 1 MG: 1 INJECTION, SOLUTION INTRAMUSCULAR; INTRAVENOUS; SUBCUTANEOUS at 19:49

## 2023-05-04 RX ADMIN — PANCRELIPASE 48000 UNITS: 24000; 76000; 120000 CAPSULE, DELAYED RELEASE PELLETS ORAL at 17:38

## 2023-05-04 RX ADMIN — INSULIN LISPRO 2 UNITS: 100 INJECTION, SOLUTION INTRAVENOUS; SUBCUTANEOUS at 17:37

## 2023-05-04 RX ADMIN — TACROLIMUS 0.5 MG: 0.5 CAPSULE ORAL at 22:04

## 2023-05-04 RX ADMIN — INSULIN LISPRO 3 UNITS: 100 INJECTION, SOLUTION INTRAVENOUS; SUBCUTANEOUS at 08:30

## 2023-05-04 RX ADMIN — ALPRAZOLAM 1 MG: 0.5 TABLET ORAL at 22:02

## 2023-05-04 RX ADMIN — TACROLIMUS 0.5 MG: 0.5 CAPSULE ORAL at 08:26

## 2023-05-04 RX ADMIN — PANCRELIPASE 48000 UNITS: 24000; 76000; 120000 CAPSULE, DELAYED RELEASE PELLETS ORAL at 08:26

## 2023-05-04 RX ADMIN — ATOVAQUONE 1500 MG: 750 SUSPENSION ORAL at 09:17

## 2023-05-04 RX ADMIN — APIXABAN 5 MG: 5 TABLET, FILM COATED ORAL at 08:26

## 2023-05-04 RX ADMIN — OXYCODONE HYDROCHLORIDE 15 MG: 5 TABLET ORAL at 13:01

## 2023-05-04 RX ADMIN — IPRATROPIUM BROMIDE AND ALBUTEROL SULFATE 3 ML: 2.5; .5 SOLUTION RESPIRATORY (INHALATION) at 20:01

## 2023-05-04 RX ADMIN — ACETAMINOPHEN 975 MG: 325 TABLET ORAL at 22:02

## 2023-05-04 RX ADMIN — INSULIN GLARGINE 10 UNITS: 100 INJECTION, SOLUTION SUBCUTANEOUS at 22:04

## 2023-05-04 RX ADMIN — PREGABALIN 150 MG: 75 CAPSULE ORAL at 22:04

## 2023-05-04 RX ADMIN — PIPERACILLIN AND TAZOBACTAM 3.38 G: 36; 4.5 INJECTION, POWDER, FOR SOLUTION INTRAVENOUS at 01:04

## 2023-05-04 RX ADMIN — DRONABINOL 5 MG: 2.5 CAPSULE ORAL at 08:25

## 2023-05-04 RX ADMIN — IPRATROPIUM BROMIDE AND ALBUTEROL SULFATE 3 ML: 2.5; .5 SOLUTION RESPIRATORY (INHALATION) at 14:23

## 2023-05-04 RX ADMIN — APIXABAN 5 MG: 5 TABLET, FILM COATED ORAL at 22:06

## 2023-05-04 RX ADMIN — PREGABALIN 150 MG: 75 CAPSULE ORAL at 08:25

## 2023-05-04 RX ADMIN — ACETAMINOPHEN 975 MG: 325 TABLET ORAL at 05:56

## 2023-05-04 RX ADMIN — FAMOTIDINE 40 MG: 20 TABLET, FILM COATED ORAL at 08:26

## 2023-05-04 RX ADMIN — PIPERACILLIN AND TAZOBACTAM 3.38 G: 36; 4.5 INJECTION, POWDER, FOR SOLUTION INTRAVENOUS at 17:40

## 2023-05-04 RX ADMIN — INSULIN LISPRO 3 UNITS: 100 INJECTION, SOLUTION INTRAVENOUS; SUBCUTANEOUS at 17:37

## 2023-05-04 RX ADMIN — PIPERACILLIN AND TAZOBACTAM 3.38 G: 36; 4.5 INJECTION, POWDER, FOR SOLUTION INTRAVENOUS at 09:16

## 2023-05-04 RX ADMIN — INSULIN LISPRO 3 UNITS: 100 INJECTION, SOLUTION INTRAVENOUS; SUBCUTANEOUS at 12:13

## 2023-05-04 RX ADMIN — IPRATROPIUM BROMIDE AND ALBUTEROL SULFATE 3 ML: 2.5; .5 SOLUTION RESPIRATORY (INHALATION) at 07:46

## 2023-05-04 RX ADMIN — ACETAMINOPHEN 975 MG: 325 TABLET ORAL at 14:03

## 2023-05-04 RX ADMIN — LIDOCAINE 1 PATCH: 50 PATCH CUTANEOUS at 08:28

## 2023-05-04 RX ADMIN — VALACYCLOVIR HYDROCHLORIDE 500 MG: 500 TABLET, FILM COATED ORAL at 22:02

## 2023-05-04 RX ADMIN — HYDROMORPHONE HYDROCHLORIDE 1 MG: 1 INJECTION, SOLUTION INTRAMUSCULAR; INTRAVENOUS; SUBCUTANEOUS at 15:27

## 2023-05-04 RX ADMIN — PANCRELIPASE 48000 UNITS: 24000; 76000; 120000 CAPSULE, DELAYED RELEASE PELLETS ORAL at 12:12

## 2023-05-04 RX ADMIN — PREDNISONE 10 MG: 10 TABLET ORAL at 08:25

## 2023-05-04 RX ADMIN — OXYCODONE HYDROCHLORIDE 15 MG: 10 TABLET ORAL at 08:25

## 2023-05-04 RX ADMIN — ALPRAZOLAM 1 MG: 0.5 TABLET ORAL at 01:12

## 2023-05-04 NOTE — PLAN OF CARE
Problem: PAIN - ADULT  Goal: Verbalizes/displays adequate comfort level or baseline comfort level  Description: Interventions:  - Encourage patient to monitor pain and request assistance  - Assess pain using appropriate pain scale  - Administer analgesics based on type and severity of pain and evaluate response  - Implement non-pharmacological measures as appropriate and evaluate response  - Consider cultural and social influences on pain and pain management  - Notify physician/advanced practitioner if interventions unsuccessful or patient reports new pain  Outcome: Progressing     Problem: INFECTION - ADULT  Goal: Absence or prevention of progression during hospitalization  Description: INTERVENTIONS:  - Assess and monitor for signs and symptoms of infection  - Monitor lab/diagnostic results  - Monitor all insertion sites, i e  indwelling lines, tubes, and drains  - Monitor endotracheal if appropriate and nasal secretions for changes in amount and color  - Stanley appropriate cooling/warming therapies per order  - Administer medications as ordered  - Instruct and encourage patient and family to use good hand hygiene technique  - Identify and instruct in appropriate isolation precautions for identified infection/condition  Outcome: Progressing     Problem: RESPIRATORY - ADULT  Goal: Achieves optimal ventilation and oxygenation  Description: INTERVENTIONS:  - Assess for changes in respiratory status  - Assess for changes in mentation and behavior  - Position to facilitate oxygenation and minimize respiratory effort  - Oxygen administered by appropriate delivery if ordered  - Initiate smoking cessation education as indicated  - Encourage broncho-pulmonary hygiene including cough, deep breathe, Incentive Spirometry  - Assess the need for suctioning and aspirate as needed  - Assess and instruct to report SOB or any respiratory difficulty  - Respiratory Therapy support as indicated  Outcome: Progressing

## 2023-05-04 NOTE — RESPIRATORY THERAPY NOTE
RT Protocol Note  Mallory Cooks Zelonisgecek 41 y o  female MRN: [id number]  Unit/Bed#: -02 Encounter: [id number]    Assessment    Principal Problem:    Acute bronchitis  Active Problems:    S/P pneumonectomy    Type 1 diabetes ([facility] )    Pancreatic insufficiency    Essential hypertension    Pulmonary embolus (HCC)    Hyponatremia      Home Pulmonary Medications:  Nebulizer/inhaler       Past Medical History:   Diagnosis Date   • ABPA (allergic bronchopulmonary aspergillosis) ([facility] ) 07/08/2012   • Acute kidney injury ([facility] ) 09/08/2017   • Acute on chronic respiratory failure ([facility] ) 03/04/2017    Last Assessment & Plan:  Formatting of this note might be different from the original  Recurrent with clinical worsening with enlarging PTX, s/p multiple chest tubes, but new fevers 6/19 and worsening leukocytosis indicating infection as cause of 6/19 decompensation with profound hypoxia in setting of shunting through right lung and continued pneumothorax s/p 2nd chest tube placement  6/27: Right   • Anaphylaxis 06/06/2022   • Asthma    • Chest pain 09/21/2021   • Chronic pain    • Chronic respiratory failure with hypoxia ([place] [address] ) 09/07/2021   • COVID-19 06/03/2022   • Cystic fibrosis ([place] [address] )    • Diabetes mellitus ([place] [address] )    • GERD (gastroesophageal reflux disease)    • HBP (high blood pressure)    • HCAP (healthcare-associated pneumonia) 03/06/2020   • Influenza B 02/23/2020   • Kidney stone    • Neutropenic fever ([facility] ) 09/26/2020   • Pneumonia due to Pseudomonas species ([place] [address] ) 10/28/2010    Last Assessment & Plan:  Formatting of this note might be different from the original  Polymicrobial pna w/ pseudomonas, M   Abscessus, Cadida glabrata/albicans   • PONV (postoperative nausea and vomiting)    • Pseudomonas aeruginosa infection 09/20/2012   • Squamous cell skin cancer 01/12/2023    SCCIS- Left upper thigh   • Transplant recipient     Lung- 2013, 2017   • Wellness examination 02/04/2013    Formatting of this note might be different from the original  Discussed with patient and acknowledgement form signed on 2013 (ALYSHA)     Social History     Socioeconomic History   • Marital status: /Civil Union     Spouse name: None   • Number of children: None   • Years of education: 12   • Highest education level: None   Occupational History   • Occupation: Disabled    Tobacco Use   • Smoking status: Former     Packs/day: 1 00     Years: 10 00     Pack years: 10 00     Types: Cigarettes   • Smokeless tobacco: Never   Vaping Use   • Vaping Use: Never used   Substance and Sexual Activity   • Alcohol use:  Yes     Alcohol/week: 2 0 standard drinks     Types: 2 Glasses of wine per week     Comment: social   • Drug use: Yes     Frequency: 7 0 times per week     Types: Marijuana, Oxycodone     Comment: medical edible marijuana prescribed   • Sexual activity: Yes     Partners: Male     Birth control/protection: Injection, Other, None     Comment: Depo Provera   Other Topics Concern   • None   Social History Narrative    Most recent tobacco use screenin2019    Do you currently or have you served in the [employer]: No    Were you activated, into active duty, as a member of the [employer] or as a Reservist: No    Occupation: disabled    Education: 12    Marital status:     Exercise level: Occasional    Diet: Specific    low sugar, high fat    General stress level: High    Alcohol intake: Occasional    Caffeine intake: Heavy    Chewing tobacco: none    Illicit drugs: none    Guns present in home: No    Seat belts used routinely: Yes    Smoke alarm in home: Yes    Advance directive: Yes     Social Determinants of Health     Financial Resource Strain: Not on file   Food Insecurity: Not on file   Transportation Needs: Not on file   Physical Activity: Not on file   Stress: Not on file   Social Connections: Not on file   Intimate Partner Violence: Not on file   Housing Stability: Not on file       Subjective    Subjective Data: awake and alert    Objective    Physical Exam:   Assessment Type: During-treatment  General Appearance: Alert, Awake  Respiratory Pattern: Irregular, Shallow, Spontaneous  Chest Assessment: Chest expansion symmetrical  Bilateral Breath Sounds: Diminished, Scattered, Coarse  L Breath Sounds: Coarse  Cough: None  O2 Device: room air    Vitals:  Blood pressure 156/85, pulse 77, temperature 98 7 °F (37 1 °C), resp  rate 16, SpO2 100 %, not currently breastfeeding  Imaging and other studies: I have personally reviewed pertinent reports        O2 Device: room air     Plan    Respiratory Plan: Mild Distress pathway        Resp Comments: respiratory protocol completed patient being admitted for acute bronchitis with significant pulmonary PMH for asthma patient uses nebulziers at home patient with noted mild respiratory distress will continue nebulzuiers with PRN inhalers as needed

## 2023-05-04 NOTE — PROGRESS NOTES
[facility]  Progress Note  Name: Rosio Stallings  MRN: [id number]  Unit/Bed#: -28 I Date of Admission: 5/3/2023   Date of Service: 5/4/2023 I Hospital Day: 0    Assessment/Plan   * Acute bronchitis  Assessment & Plan  · Presented with complaining of shortness of breath, thoracic pain with coughing  · ED had discussed case with patient's CRNP at [facility] who recommended patient to be admitted under observation for IV Zosyn due to her history  · CTA Chest (5/3/23): Negative for pulmonary embolism, left lung transplant with tiny focus of groundglass opacity left upper lobe  · Continue pain management; Robitussin for cough, Nebs  · Continue IV Zosyn empirically for now  · Pulmonology consult, appreciate input    Hyponatremia  Assessment & Plan  · Presented with sodium of 127  · Suspected pseudohyponatremia due to hyperglycemia  · Patient did receive 1 dose of salt tablet in the emergency room  · Improved to 132 this morning  · Monitor BMP  Pulmonary embolus (HCC)  Assessment & Plan  · History of PE, DVT in the past   · Continue Eliquis 5 mg twice daily  Essential hypertension  Assessment & Plan  · Chronic  · Uncontrolled on presentation; 207/82  · Not on any antihypertensive agents at home  · Continue IV Hydralazine for SBP >160  · Now improved; 134/76    Pancreatic insufficiency  Assessment & Plan  · Resume home dose of pancreatic enzymes with meals  Type 1 diabetes (HCC)  Assessment & Plan  • Chronic, uncontrolled, as evidenced by a hemoglobin A1C of 8 5  • Home regimen includes:  o 12 units Glargine daily; with 5 units Humalog with sliding scale  • Continue insulin sliding scale with 10 units Lantus, 3 units Humalog with meals in addition to sliding scale  • Monitor blood glucose AC/HS  • Hypoglycemia protocol  • [facility] diet    S/P pneumonectomy  Assessment & Plan  · Past medical history of cystic fibrosis s/p pneumonectomy x2    · S/p lung transplant  · Currently patient has left-sided functioning lung  · Resume home dose of atovaquone, Prograf, Valtrex, prednisone  · Patient is also on azithromycin 3 times a week which is on hold due to being on Zosyn for now  · Follows at [facility]  VTE Pharmacologic Prophylaxis: VTE Score: 1 Moderate Risk (Score 3-4) - Pharmacological DVT Prophylaxis Ordered: apixaban (Eliquis)  Patient Centered Rounds: I performed bedside rounds with nursing staff today  Discussions with Specialists or Other Care Team Provider: Case Management    Education and Discussions with Family / Patient: Patient declined call to   Total Time Spent on Date of Encounter in care of patient: 35 minutes This time was spent on one or more of the following: performing physical exam; counseling and coordination of care; obtaining or reviewing history; documenting in the medical record; reviewing/ordering tests, medications or procedures; communicating with other healthcare professionals and discussing with patient's family/caregivers  Current Length of Stay: 0 day(s)  Current Patient Status: Observation   Certification Statement: The patient will continue to require additional inpatient hospital stay due to ongoing treatment in setting of pneumonia; need for IV Abx  Discharge Plan: Anticipate discharge in 48-72 hrs to home  Code Status: Level 1 - Full Code    Subjective:   Patient resting in bed; reports she feels a tiny bit better today but not completely  Patient reports it hurts when she coughs and its hard for her to get sputum out  Adjustments made to her pain medication regimen  Objective:     Vitals:   Temp (24hrs), Av 5 °F (36 9 °C), Min:97 8 °F (36 6 °C), Max:98 8 °F (37 1 °C)    Temp:  [97 8 °F (36 6 °C)-98 8 °F (37 1 °C)] 98 6 °F (37 °C)  HR:  [64-90] 64  Resp:  [14-21] 16  BP: (126-207)/(74-96) 134/76  SpO2:  [96 %-100 %] 96 %  Body mass index is 21 03 kg/m²       Input and Output Summary (last 24 hours): Intake/Output Summary (Last 24 hours) at 5/4/2023 1210  Last data filed at 5/4/2023 9999  Gross per 24 hour   Intake 240 ml   Output --   Net 240 ml       Physical Exam:   Physical Exam  Vitals and nursing note reviewed  Constitutional:       General: She is not in acute distress  Appearance: She is normal weight  She is ill-appearing  Cardiovascular:      Rate and Rhythm: Normal rate  Pulses: Normal pulses  Heart sounds: Normal heart sounds  Pulmonary:      Effort: Pulmonary effort is normal  No tachypnea, bradypnea or respiratory distress  Breath sounds: Decreased breath sounds present  Abdominal:      General: Bowel sounds are normal       Palpations: Abdomen is soft  Musculoskeletal:         General: Normal range of motion  Skin:     General: Skin is warm and dry  Neurological:      Mental Status: She is alert and oriented to person, place, and time     Psychiatric:         Mood and Affect: Mood normal           Additional Data:     Labs:  Results from last 7 days   Lab Units 05/04/23  0817 05/03/23  1429   WBC Thousand/uL 4 81 7 25   HEMOGLOBIN g/dL 9 0* 10 0*   HEMATOCRIT % 30 3* 33 4*   PLATELETS Thousands/uL 176 178   NEUTROS PCT %  --  80*   LYMPHS PCT %  --  12*   MONOS PCT %  --  7   EOS PCT %  --  1     Results from last 7 days   Lab Units 05/04/23  0817   SODIUM mmol/L 132*   POTASSIUM mmol/L 4 4   CHLORIDE mmol/L 101   CO2 mmol/L 27   BUN mg/dL 21   CREATININE mg/dL 0 90   ANION GAP mmol/L 4   CALCIUM mg/dL 8 7   GLUCOSE RANDOM mg/dL 119         Results from last 7 days   Lab Units 05/04/23  1127 05/04/23  0733 05/04/23  0637 05/03/23  2210 05/03/23  1759   POC GLUCOSE mg/dl 136 120 120 315* 287*         Results from last 7 days   Lab Units 05/04/23  0559 05/03/23  2254 05/03/23  1429   LACTIC ACID mmol/L  --  1 8 2 1*   PROCALCITONIN ng/ml 0 07 0 05  --        Lines/Drains:  Invasive Devices     Central Venous Catheter Line  Duration           Port A Cath 11/25/22 Right Chest 160 days          Drain  Duration           Ureteral Drain/Stent Right ureter 6 Fr  1184 days                Central Line:  Goal for removal: Port accessed  Will de-access as appropriate  Imaging: No pertinent imaging reviewed  Recent Cultures (last 7 days):   Results from last 7 days   Lab Units 05/03/23  1446 05/03/23  1444   BLOOD CULTURE  Received in Microbiology Lab  Culture in Progress  Received in Microbiology Lab  Culture in Progress         Last 24 Hours Medication List:   Current Facility-Administered Medications   Medication Dose Route Frequency Provider Last Rate   • acetaminophen  650 mg Oral Q6H PRN Denae Arauz PA-C     • acetaminophen  975 mg Oral Q8H [facility] Tom SESAY MD     • albuterol  2 puff Inhalation Q4H PRN Tom SESAY MD     • ALPRAZolam  1 mg Oral HS PRN Tom SESAY MD     • apixaban  5 mg Oral BID Tom SESAY MD     • atovaquone  1,500 mg Oral Daily Tom SESAY MD     • dextromethorphan-guaiFENesin  5 mL Oral Q4H PRN Tom SESAY MD     • dronabinol  5 mg Oral TID Fabian Mcardle V, MD     • famotidine  40 mg Oral Daily Tom SESAY MD     • hydrALAZINE  5 mg Intravenous Q6H PRN Tom SESAY MD     • HYDROmorphone  1 mg Intravenous Q4H PRN WAYNE Allison     • insulin glargine  10 Units Subcutaneous HS Tom SESAY MD     • insulin lispro  1-5 Units Subcutaneous TID AC Tom SESAY MD     • insulin lispro  3 Units Subcutaneous TID With Meals Tom SESAY MD     • ipratropium-albuterol  3 mL Nebulization TID Tom SESAY MD     • lidocaine  1 patch Topical Daily Denae Arauz PA-C     • ondansetron  4 mg Oral Q6H PRN Tom SESAY MD     • oxyCODONE  10 mg Oral Q4H PRN WAYNE Allison      Or   • oxyCODONE  15 mg Oral Q4H PRN WAYNE Allison     • pancrelipase (Lip-Prot-Amyl)  48,000 Units Oral TID With Meals Sherrie Batista MD     • piperacillin-tazobactam  3 375 g Intravenous Q8H Tom SESAY MD 3 375 g (05/04/23 0916)   • predniSONE  10 mg Oral Daily Tom SESAY MD     • pregabalin  150 mg Oral BID Tom SESAY MD     • tacrolimus  0 5 mg Oral BID Tom SESAY MD     • valACYclovir  500 mg Oral HS Alex Conroy MD     • zolpidem  2 5 mg Oral HS PRN Alex Conroy MD          Today, Patient Was Seen By: WAYNE Carlos    **Please Note: This note may have been constructed using a voice recognition system  **

## 2023-05-04 NOTE — ASSESSMENT & PLAN NOTE
• Chronic, uncontrolled, as evidenced by a hemoglobin A1C of 8 5  • Home regimen includes:  o 12 units Glargine daily; with 5 units Humalog with sliding scale  • Continue insulin sliding scale with 10 units Lantus, 3 units Humalog with meals in addition to sliding scale    • Monitor blood glucose AC/HS  • Hypoglycemia protocol  • [facility] diet

## 2023-05-04 NOTE — CONSULTS
Consultation - Pulmonary Medicine   Coral Marina Fortune 41 y o  female MRN: [id number]  Unit/Bed#: -02 Encounter: [id number]      Assessment:  1  Abnormal chest CT  2  Acute tracheobronchitis  3  Cystic fibrosis s/p lung transplant x 2  4  H/o DVT/PE    Plan:   Cough with pleuritic pain x 1 day  CT scan without acute PE, tiny focus of groundglass opacity in the RAJIV  Her procalcitonin has been normal x 2, she has no leukocytosis and she is afebrile  CT scan also showing dilated fluid filled esophagus - she is s/p TIF in March and reports resolution of her GERD symptoms  She is on Zosyn which we will continue for now  Sputum culture has been ordered  Given her complicated history with recurrent pneumonia requiring ECMO x2, would recommend treatment for pneumonia  I did speak with WAYNE Bell at the [facility] transplant clinic and she is in agreement with treatment  Continue transplant meds with atovaquone, prednisone, Prograf, valacyclovir, Creon  DuoNeb 3 times per day  Eliquis for history of PE/DVT  Will continue to follow  History of Present Illness   Physician Requesting Consult: Lissette Rankin MD  Reason for Consult / Principal Problem: Pneumonia  Hx and PE limited by: None  HPI: Nicolas Mcdonald is a 41y o  year old female with past medical history of cystic fibrosis status post lung transplant x2, diabetes, GERD who presents with complaint of left-sided pleuritic pain as well as cough for the last 1 day  Symptoms are similar to her prior pneumonia in the past   She denies any fever or chills  She did recently have a TIF procedure in March or so and has been doing well with her GERD, feels her symptoms have resolved  She had been doing well with her breathing since her hospitalization in September      She has history of cystic fibrosis status post bilateral lung transplant in 6623 complicated by rejection/severe POS and left single lung transplant in 2017 with 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, Mycobacterium  She had recurrent pulmonary infections requiring ECMO x2 and is status post right pneumonectomy in 2021  She was most recently hospitalized in August/September 2022 for Pseudomonas pneumonia and MSSA, also with elevated beta D glucan and was treated with posaconazole  Since then she has been diagnosed with multiple DVT and has been on Eliquis  Consults    Review of Systems   Constitutional: Negative  HENT: Negative  Respiratory: Positive for cough  Pleuritic pain   Cardiovascular: Negative  Gastrointestinal: Negative  Genitourinary: Negative  Musculoskeletal: Negative  Skin: Negative  Allergic/Immunologic: Negative  Neurological: Negative  Psychiatric/Behavioral: Negative  Historical Information   Past Medical History:   Diagnosis Date   • ABPA (allergic bronchopulmonary aspergillosis) ([place] [address] ) 07/08/2012   • Acute kidney injury ([place] [address] ) 09/08/2017   • Acute on chronic respiratory failure ([place] [address] ) 03/04/2017    Last Assessment & Plan:  Formatting of this note might be different from the original  Recurrent with clinical worsening with enlarging PTX, s/p multiple chest tubes, but new fevers 6/19 and worsening leukocytosis indicating infection as cause of 6/19 decompensation with profound hypoxia in setting of shunting through right lung and continued pneumothorax s/p 2nd chest tube placement    6/27: Right   • Anaphylaxis 06/06/2022   • Asthma    • Chest pain 09/21/2021   • Chronic pain    • Chronic respiratory failure with hypoxia ([place] [address] ) 09/07/2021   • COVID-19 06/03/2022   • Cystic fibrosis (HCC)    • Diabetes mellitus ([place] [address] )    • GERD (gastroesophageal reflux disease)    • HBP (high blood pressure)    • HCAP (healthcare-associated pneumonia) 03/06/2020   • Influenza B 02/23/2020   • Kidney stone    • Neutropenic fever ([place] [address] ) 09/26/2020   • Pneumonia due to Pseudomonas species ([place] [address] ) 10/28/2010    Last Assessment & Plan: Formatting of this note might be different from the original  Polymicrobial pna w/ pseudomonas, M  Abscessus, Cadida glabrata/albicans   • PONV (postoperative nausea and vomiting)    • Pseudomonas aeruginosa infection 09/20/2012   • Squamous cell skin cancer 01/12/2023    SCCIS- Left upper thigh   • Transplant recipient     Lung- 2013, 2017   • Wellness examination 02/04/2013    Formatting of this note might be different from the original  Discussed with patient and acknowledgement form signed on 2/4/2013 (ALYSHA)     Past Surgical History:   Procedure Laterality Date   • APPENDECTOMY     • AUGMENTATION MAMMAPLASTY Bilateral 2004   • AUGMENTATION MAMMAPLASTY Right 2021    removed    • BREAST SURGERY      Aug     • BRONCHOSCOPY     • CHOLECYSTECTOMY     • COLONOSCOPY     • EGD  07/2019    Yeast Infect      • FL RETROGRADE PYELOGRAM  01/09/2020   • FL RETROGRADE PYELOGRAM  02/04/2020   • GASTROSTOMY TUBE, PLACE     • IR BALLOON-OCCLUDED ANTEGRADE TRANSVENOUS OBLITERATION (BATO)  11/22/2022   • IR PORT PLACEMENT  03/22/2022   • IR PORT REMOVAL  01/10/2022   • IR PORT STRIPPING  11/25/2022   • IR TUNNELED CENTRAL LINE PLACEMENT  01/13/2022   • LUNG SURGERY     • LUNG TRANSPLANT      X2   • NH CYSTO BLADDER W/URETERAL CATHETERIZATION Right 01/09/2020    Procedure: CYSTOSCOPY RETROGRADE PYELOGRAM WITH INSERTION STENT URETERAL;  Surgeon: Omaira Ty MD;  Location: [facility];  Service: Urology   • NH CYSTO/URETERO W/LITHOTRIPSY &INDWELL STENT INSRT Right 02/04/2020    Procedure: CYSTOSCOPY URETEROSCOPY WITH LITHOTRIPSY HOLMIUM LASER, RETROGRADE PYELOGRAM AND INSERTION STENT URETERAL;  Surgeon: Omaira Ty MD;  Location: [facility];  Service: Urology   • SKIN BIOPSY     • US GUIDED VASCULAR ACCESS  01/17/2017     Social History   Social History     Substance and Sexual Activity   Alcohol Use Yes   • Alcohol/week: 2 0 standard drinks   • Types: 2 Glasses of wine per week    Comment: social     Social History Substance and Sexual Activity   Drug Use Yes   • Frequency: 7 0 times per week   • Types: Marijuana, Oxycodone    Comment: medical edible marijuana prescribed     E-Cigarette/Vaping   • E-Cigarette Use Never User      E-Cigarette/Vaping Substances     Social History     Tobacco Use   Smoking Status Former   • Packs/day: 1 00   • Years: 10 00   • Pack years: 10 00   • Types: Cigarettes   Smokeless Tobacco Never     Occupational History:     Family History:   Family History   Problem Relation Age of Onset   • Alcohol abuse Mother    • Mental illness Mother    • Depression Mother    • Alcohol abuse Father    • COPD Maternal Grandmother    • Lung cancer Maternal Grandmother 72   • Cancer Maternal Grandmother    • COPD Maternal Grandfather    • Cancer Maternal Grandfather    • COPD Paternal Grandmother    • Lung cancer Paternal Grandmother 72   • No Known Problems Sister    • No Known Problems Paternal Aunt    • Breast cancer Neg Hx    • Ovarian cancer Neg Hx    • Colon cancer Neg Hx        Meds/Allergies   all current active meds have been reviewed, pertinent pulmonary meds have been reviewed and current meds:   Current Facility-Administered Medications   Medication Dose Route Frequency   • acetaminophen (TYLENOL) tablet 650 mg  650 mg Oral Q6H PRN   • acetaminophen (TYLENOL) tablet 975 mg  975 mg Oral Q8H [facility]   • albuterol (PROVENTIL HFA,VENTOLIN HFA) inhaler 2 puff  2 puff Inhalation Q4H PRN   • ALPRAZolam (XANAX) tablet 1 mg  1 mg Oral HS PRN   • apixaban (ELIQUIS) tablet 5 mg  5 mg Oral BID   • atovaquone (MEPRON) oral suspension 1,500 mg  1,500 mg Oral Daily   • dextromethorphan-guaiFENesin (ROBITUSSIN DM) oral syrup 5 mL  5 mL Oral Q4H PRN   • dronabinol (MARINOL) capsule 5 mg  5 mg Oral TID   • famotidine (PEPCID) tablet 40 mg  40 mg Oral Daily   • hydrALAZINE (APRESOLINE) injection 5 mg  5 mg Intravenous Q6H PRN   • HYDROmorphone (DILAUDID) injection 1 mg  1 mg Intravenous Q4H PRN   • insulin glargine "(LANTUS) subcutaneous injection 10 Units 0 1 mL  10 Units Subcutaneous HS   • insulin lispro (HumaLOG) 100 units/mL subcutaneous injection 1-5 Units  1-5 Units Subcutaneous TID AC   • insulin lispro (HumaLOG) 100 units/mL subcutaneous injection 3 Units  3 Units Subcutaneous TID With Meals   • ipratropium-albuterol (DUO-NEB) 0 5-2 5 mg/3 mL inhalation solution 3 mL  3 mL Nebulization TID   • lidocaine (LIDODERM) 5 % patch 1 patch  1 patch Topical Daily   • ondansetron (ZOFRAN-ODT) dispersible tablet 4 mg  4 mg Oral Q6H PRN   • oxyCODONE (ROXICODONE) immediate release tablet 10 mg  10 mg Oral Q4H PRN    Or   • oxyCODONE (ROXICODONE) IR tablet 15 mg  15 mg Oral Q4H PRN   • pancrelipase (Lip-Prot-Amyl) (CREON) delayed release capsule 48,000 Units  48,000 Units Oral TID With Meals   • piperacillin-tazobactam (ZOSYN) 3 375 g in sodium chloride 0 9 % 100 mL IVPB  3 375 g Intravenous Q8H   • predniSONE tablet 10 mg  10 mg Oral Daily   • pregabalin (LYRICA) capsule 150 mg  150 mg Oral BID   • tacrolimus (PROGRAF) capsule 0 5 mg  0 5 mg Oral BID   • valACYclovir (VALTREX) tablet 500 mg  500 mg Oral HS   • zolpidem (AMBIEN) tablet 2 5 mg  2 5 mg Oral HS PRN       Allergies   Allergen Reactions   • Bebtelovimab Anaphylaxis   • Vancomycin Angioedema and Hives   • Gabapentin Hallucinations   • Ceftazidime Headache     Severe headaches after desensitization   • Nsaids Other (See Comments)     Lung transplant increases risk of renal toxicity, call lung txp provider to discuss if needed       Objective   Vitals: Blood pressure 134/76, pulse 64, temperature 98 6 °F (37 °C), resp  rate 16, height 5' 2\" (1 575 m), weight 52 2 kg (115 lb), SpO2 96 %, not currently breastfeeding  ,Body mass index is 21 03 kg/m²      Intake/Output Summary (Last 24 hours) at 5/4/2023 1150  Last data filed at 5/4/2023 5923  Gross per 24 hour   Intake 240 ml   Output --   Net 240 ml     Invasive Devices     Central Venous Catheter Line  Duration           Port " A Cath 11/25/22 Right Chest 160 days          Drain  Duration           Ureteral Drain/Stent Right ureter 6 Fr  1184 days                Physical Exam  Vitals reviewed  Constitutional:       General: She is not in acute distress  Appearance: Normal appearance  She is not ill-appearing  HENT:      Head: Normocephalic and atraumatic  Mouth/Throat:      Pharynx: Oropharynx is clear  Eyes:      Conjunctiva/sclera: Conjunctivae normal    Cardiovascular:      Rate and Rhythm: Normal rate and regular rhythm  Pulmonary:      Effort: Pulmonary effort is normal  No respiratory distress  Comments: Coarse breath sounds on left  Abdominal:      General: Abdomen is flat  There is no distension  Musculoskeletal:         General: Normal range of motion  Right lower leg: No edema  Left lower leg: No edema  Skin:     General: Skin is warm and dry  Neurological:      Mental Status: She is alert and oriented to person, place, and time  Psychiatric:         Mood and Affect: Mood normal          Behavior: Behavior normal          Lab Results:   I have personally reviewed pertinent lab results  , CBC:   Lab Results   Component Value Date    WBC 4 81 05/04/2023    HGB 9 0 (L) 05/04/2023    HCT 30 3 (L) 05/04/2023    MCV 75 (L) 05/04/2023     05/04/2023    MCH 22 2 (L) 05/04/2023    MCHC 29 7 (L) 05/04/2023    RDW 19 6 (H) 05/04/2023    MPV 10 2 05/04/2023    NRBC 0 05/03/2023   , CMP:   Lab Results   Component Value Date    SODIUM 132 (L) 05/04/2023    K 4 4 05/04/2023     05/04/2023    CO2 27 05/04/2023    BUN 21 05/04/2023    CREATININE 0 90 05/04/2023    CALCIUM 8 7 05/04/2023    EGFR 77 05/04/2023     Imaging Studies: I have personally reviewed pertinent reports  and I have personally reviewed pertinent films in PACS  EKG, Pathology, and Other Studies: I have personally reviewed pertinent reports      VTE Prophylaxis: Sequential compression device (Venodyne)     Code Status: Level 1 - Full Code  Advance Directive and Living Will:      Power of :    POLST:

## 2023-05-04 NOTE — ASSESSMENT & PLAN NOTE
· Presented with complaining of shortness of breath, thoracic pain with coughing  · ED had discussed case with patient's CRNP at [facility] who recommended patient to be admitted under observation for IV Zosyn due to her history  · CTA Chest (5/3/23): Negative for pulmonary embolism, left lung transplant with tiny focus of groundglass opacity left upper lobe  · Continue pain management; Robitussin for cough, Nebs  · Continue IV Zosyn empirically for now    · Pulmonology consult, appreciate input

## 2023-05-04 NOTE — ASSESSMENT & PLAN NOTE
· Past medical history of cystic fibrosis s/p pneumonectomy x2  · S/p lung transplant  · Currently patient has left-sided functioning lung  · Resume home dose of atovaquone, Prograf, Valtrex, prednisone  · Patient is also on azithromycin 3 times a week which is on hold due to being on Zosyn for now  · Follows at [facility]

## 2023-05-04 NOTE — PLAN OF CARE
Problem: PAIN - ADULT  Goal: Verbalizes/displays adequate comfort level or baseline comfort level  Description: Interventions:  - Encourage patient to monitor pain and request assistance  - Assess pain using appropriate pain scale  - Administer analgesics based on type and severity of pain and evaluate response  - Implement non-pharmacological measures as appropriate and evaluate response  - Consider cultural and social influences on pain and pain management  - Notify physician/advanced practitioner if interventions unsuccessful or patient reports new pain  Outcome: Progressing     Problem: INFECTION - ADULT  Goal: Absence or prevention of progression during hospitalization  Description: INTERVENTIONS:  - Assess and monitor for signs and symptoms of infection  - Monitor lab/diagnostic results  - Monitor all insertion sites, i e  indwelling lines, tubes, and drains  - Monitor endotracheal if appropriate and nasal secretions for changes in amount and color  - [place] appropriate cooling/warming therapies per order  - Administer medications as ordered  - Instruct and encourage patient and family to use good hand hygiene technique  - Identify and instruct in appropriate isolation precautions for identified infection/condition  Outcome: Progressing  Goal: Absence of fever/infection during neutropenic period  Description: INTERVENTIONS:  - Monitor WBC    Outcome: Progressing     Problem: SAFETY ADULT  Goal: Patient will remain free of falls  Description: INTERVENTIONS:  - Educate patient/family on patient safety including physical limitations  - Instruct patient to call for assistance with activity   - Consult OT/PT to assist with strengthening/mobility   - Keep Call bell within reach  - Keep bed low and locked with side rails adjusted as appropriate  - Keep care items and personal belongings within reach  - Initiate and maintain comfort rounds  - Make Fall Risk Sign visible to staff  - Offer Toileting every 2 Hours, in advance of need  - Initiate/Maintain 2alarm  - Obtain necessary fall risk management equipment: 2  - Apply yellow socks and bracelet for high fall risk patients  - Consider moving patient to room near nurses station  Outcome: Progressing  Goal: Maintain or return to baseline ADL function  Description: INTERVENTIONS:  -  Assess patient's ability to carry out ADLs; assess patient's baseline for ADL function and identify physical deficits which impact ability to perform ADLs (bathing, care of mouth/teeth, toileting, grooming, dressing, etc )  - Assess/evaluate cause of self-care deficits   - Assess range of motion  - Assess patient's mobility; develop plan if impaired  - Assess patient's need for assistive devices and provide as appropriate  - Encourage maximum independence but intervene and supervise when necessary  - Involve family in performance of ADLs  - Assess for home care needs following discharge   - Consider OT consult to assist with ADL evaluation and planning for discharge  - Provide patient education as appropriate  Outcome: Progressing  Goal: Maintains/Returns to pre admission functional level  Description: INTERVENTIONS:  - Perform BMAT or MOVE assessment daily    - Set and communicate daily mobility goal to care team and patient/family/caregiver  - Collaborate with rehabilitation services on mobility goals if consulted  - Perform Range of Motion 2 times a day  - Reposition patient every 2 hours    - Dangle patient 2 times a day  - Stand patient 2 times a day  - Ambulate patient 2 times a day  - Out of bed to chair 2 times a day   - Out of bed for meals 2 times a day  - Out of bed for toileting  - Record patient progress and toleration of activity level   Outcome: Progressing     Problem: DISCHARGE PLANNING  Goal: Discharge to home or other facility with appropriate resources  Description: INTERVENTIONS:  - Identify barriers to discharge w/patient and caregiver  - Arrange for needed discharge resources and transportation as appropriate  - Identify discharge learning needs (meds, wound care, etc )  - Arrange for interpretive services to assist at discharge as needed  - Refer to Case Management Department for coordinating discharge planning if the patient needs post-hospital services based on physician/advanced practitioner order or complex needs related to functional status, cognitive ability, or social support system  Outcome: Progressing     Problem: Knowledge Deficit  Goal: Patient/family/caregiver demonstrates understanding of disease process, treatment plan, medications, and discharge instructions  Description: Complete learning assessment and assess knowledge base    Interventions:  - Provide teaching at level of understanding  - Provide teaching via preferred learning methods  Outcome: Progressing     Problem: RESPIRATORY - ADULT  Goal: Achieves optimal ventilation and oxygenation  Description: INTERVENTIONS:  - Assess for changes in respiratory status  - Assess for changes in mentation and behavior  - Position to facilitate oxygenation and minimize respiratory effort  - Oxygen administered by appropriate delivery if ordered  - Initiate smoking cessation education as indicated  - Encourage broncho-pulmonary hygiene including cough, deep breathe, Incentive Spirometry  - Assess the need for suctioning and aspirate as needed  - Assess and instruct to report SOB or any respiratory difficulty  - Respiratory Therapy support as indicated  Outcome: Progressing

## 2023-05-04 NOTE — ASSESSMENT & PLAN NOTE
Present on mission history of hypertension  Uncontrolled on presentation  Not on any antihypertensive agents at home  Hydralazine IV as needed for now

## 2023-05-04 NOTE — ASSESSMENT & PLAN NOTE
Past medical history of cystic fibrosis s/p pneumonectomy x2  S/p lung transplant  Currently patient has left-sided functioning lung  Resume home dose of atovaquone, Prograf, Valtrex, prednisone  Patient is also on azithromycin 3 times a week which is on hold due to being on Zosyn for now  Follows at [facility]

## 2023-05-04 NOTE — H&P
[address] [place] [address]  H&P  Name: Chad Doan 41 y o  female I MRN: [id number]  Unit/Bed#: -02 I Date of Admission: 5/3/2023   Date of Service: 5/3/2023 I Hospital Day: 0      Assessment/Plan   * Acute bronchitis  Assessment & Plan  82-year-old female patient with past medical history of cystic fibrosis, pneumonectomy, lung transplant x2, currently has left-sided 1 functioning lung, history of DVT, PE, currently on Eliquis, previous history of severe pulmonary infection requiring ECMO in the past   Presented with complaining of shortness of breath, thoracic pain with coughing  ED had discussed  Case with patient's CRNP at [facility]( Her contact information is [phone] at the office and personal cell is [phone]) who recommended patient to be admitted under observation for IV Zosyn due to her history  CTA PE study negative for pulmonary embolism, left lung transplant with tiny focus of groundglass opacity left upper lobe  CBC without leukocytosis    Currently patient appears comfortable nondistressed  O2 saturation 97 percent on room air  Complaining of cough, pain around left side of thorax with coughing  Reports pain improved with IV Dilaudid  Denies fever, chills, nausea, vomiting, abdominal pain, diarrhea, any other illness  Continue IV Zosyn empirically for now  F/u with HS trop trend  Follow-up with pulmonology consult  Monitor procalcitonin trend, monitor CBC with differential in AM   Continue with supportive care  Hyponatremia  Assessment & Plan  Presented with sodium of 127  Suspected pseudohyponatremia due to hyperglycemia  Patient did receive 1 dose of salt tablet in the emergency room  Monitor BMP  Pulmonary embolus (HCC)  Assessment & Plan  History of PE, DVT in the past   Continue Eliquis 5 mg twice daily  Essential hypertension  Assessment & Plan  Present on mission history of hypertension    Uncontrolled on presentation  Not on any antihypertensive agents at home  Hydralazine IV as needed for now  Pancreatic insufficiency  Assessment & Plan  Resume home dose of pancreatic enzymes with meals  Type 1 diabetes [facility])  Assessment & Plan  Lab Results   Component Value Date    HGBA1C 8 5 02/17/2023       Recent Labs     05/03/23  1759   POCGLU 287*       Blood Sugar Average: Last 72 hrs:  (P) 287     Present on admission history of type 1 diabetes  Patient is on long-acting insulin 12 units at bedtime, short-acting 4 to 5 units depending on sliding-scale coverage CAD with meals  Continue with diabetic diet, sliding-scale coverage  S/P pneumonectomy  Assessment & Plan  Past medical history of cystic fibrosis s/p pneumonectomy x2  S/p lung transplant  Currently patient has left-sided functioning lung  Resume home dose of atovaquone, Prograf, Valtrex, prednisone  Patient is also on azithromycin 3 times a week which is on hold due to being on Zosyn for now  [facility] at [facility]  VTE Pharmacologic Prophylaxis: VTE Score: 1 Moderate Risk (Score 3-4) - Pharmacological DVT Prophylaxis Ordered: apixaban (Eliquis)  Code Status: Level 1 - Full Code     Anticipated Length of Stay: Patient will be admitted on an observation basis with an anticipated length of stay of less than 2 midnights secondary to Bronchitis  Total Time Spent on Date of Encounter in care of patient: 85 minutes This time was spent on one or more of the following: performing physical exam; counseling and coordination of care; obtaining or reviewing history; documenting in the medical record; reviewing/ordering tests, medications or procedures; communicating with other healthcare professionals and discussing with patient's family/caregivers      Chief Complaint: Shortness of breath, cough    History of Present Illness:    Almita Osborn is a 41 y o  female with a PMH of cystic fibrosis, history of lung transplant x2, pneumonectomy, currently has 1 left-sided functioning lung, chronically on immunosuppressants, chronic opioid use, anxiety, history of DVT, PE currently on Eliquis, anemia, asthma, diabetes type 1, pulmonary hypertension, protein calorie moderation, who presents with complaining of cough, shortness of breath of 3 days duration  Patient denies fever, chills, nausea, vomiting, dysuria, diarrhea, or any other associate symptoms  Patient had significant history of being on ECMO due to infection in the past   ED had discussed case with patient's CRNP at [facility] who recommended patient to be hospitalized under observation here at [facility] and no need to transfer and continue IV Zosyn given her significant history previously  Current encounter patient is complaining of cough, left-sided thoracic pain and requesting for IV Dilaudid  Denies any other complaint at this time  No other events reported  Denies smoking  Reports drinking alcohol socially  No other substance use  Denies electric cigarettes  No other events reported  Review of Systems:  Review of Systems   Constitutional: Negative for chills and fever  HENT: Negative for congestion  Eyes: Negative for visual disturbance  Respiratory: Positive for cough and shortness of breath  Cardiovascular: Negative for chest pain, palpitations and leg swelling  Gastrointestinal: Negative for abdominal pain, diarrhea and vomiting  Endocrine: Negative for polyuria  Genitourinary: Negative for dysuria  Neurological: Negative for weakness  Psychiatric/Behavioral: Negative for agitation         Past Medical and Surgical History:   Past Medical History:   Diagnosis Date   • ABPA (allergic bronchopulmonary aspergillosis) ([facility] ) 07/08/2012   • Acute kidney injury ([facility] ) 09/08/2017   • Acute on chronic respiratory failure ([facility] ) 03/04/2017    Last Assessment & Plan:  Formatting of this note might be different from the original  Recurrent with clinical worsening with enlarging PTX, s/p multiple chest tubes, but new fevers 6/19 and worsening leukocytosis indicating infection as cause of 6/19 decompensation with profound hypoxia in setting of shunting through right lung and continued pneumothorax s/p 2nd chest tube placement  6/27: Right   • Anaphylaxis 06/06/2022   • Asthma    • Chest pain 09/21/2021   • Chronic pain    • Chronic respiratory failure with hypoxia ([facility] ) 09/07/2021   • COVID-19 06/03/2022   • Cystic fibrosis ([facility] )    • Diabetes mellitus ([facility] )    • GERD (gastroesophageal reflux disease)    • HBP (high blood pressure)    • HCAP (healthcare-associated pneumonia) 03/06/2020   • Influenza B 02/23/2020   • Kidney stone    • Neutropenic fever ([facility] ) 09/26/2020   • Pneumonia [facility] to Pseud[facility] species [facility] 10/28/2010    Last Assessment & Plan:  Formatting of this note might be different from the original  Polymicrobial pna w/ pseudomonas, M  Abscessus, Cadida glabrata/albicans   • PONV (postoperative nausea and vomiting)    • Pseudomonas aeruginosa infection 09/20/2012   • Squamous cell skin cancer 01/12/2023    SCCIS- Left upper thigh   • Transplant recipient     Lung- 2013, 2017   • Wellness examination 02/04/2013    Formatting of this note might be different from the original  Discussed with patient and acknowledgement form signed on 2/4/2013 (ALYSHA)       Past Surgical History:   Procedure Laterality Date   • APPENDECTOMY     • AUGMENTATION MAMMAPLASTY Bilateral 2004   • AUGMENTATION MAMMAPLASTY Right 2021    removed    • BREAST SURGERY      Aug     • BRONCHOSCOPY     • CHOLECYSTECTOMY     • COLONOSCOPY     • EGD  07/2019    Yeast Infect      • FL RETROGRADE PYELOGRAM  01/09/2020   • FL RETROGRADE PYELOGRAM  02/04/2020   • GASTROSTOMY TUBE, PLACE     • IR BALLOON-OCCLUDED ANTEGRADE TRANSVENOUS OBLITERATION (BATO)  11/22/2022   • IR PORT PLACEMENT  03/22/2022   • IR PORT REMOVAL  01/10/2022   • IR PORT STRIPPING  11/25/2022   • IR TUNNELED CENTRAL LINE PLACEMENT  01/13/2022   • LUNG SURGERY     • LUNG TRANSPLANT      X2   • CA CYSTO BLADDER W/URETERAL CATHETERIZATION Right 01/09/2020    Procedure: CYSTOSCOPY RETROGRADE PYELOGRAM WITH INSERTION STENT URETERAL;  Surgeon: Madhu Campbell MD;  Location: [facility];  Service: Urology   • CA CYSTO/URETERO W/LITHOTRIPSY &INDWELL STENT INSRT Right 02/04/2020    Procedure: CYSTOSCOPY URETEROSCOPY WITH LITHOTRIPSY HOLMIUM LASER, RETROGRADE PYELOGRAM AND INSERTION STENT URETERAL;  Surgeon: Madhu Campbell MD;  Location: [facility];  Service: Urology   • SKIN BIOPSY     • US GUIDED VASCULAR ACCESS  01/17/2017       Meds/Allergies:  Prior to Admission medications    Medication Sig Start Date End Date Taking? Authorizing Provider   acetaminophen (TYLENOL) 325 mg tablet Take 3 tablets (975 mg total) by mouth every 8 (eight) hours 9/26/21  Yes WAYNE Paredes   albuterol (2 5 mg/3 mL) 0 083 % nebulizer solution Take 6 mL (5 mg total) by nebulization 2 (two) times a day 9/26/21  Yes WAYNE Paredes   ALPRAZolam Jael Hilt) 1 mg tablet Take 1 tablet (1 mg total) by mouth daily at bedtime as needed for anxiety or sleep 4/3/23  Yes Paulina Riojas MD   apixaban (Eliquis) 5 mg Take 2 tablets (10 mg total) by mouth 2 (two) times a day for 7 days, THEN 1 tablet (5 mg total) 2 (two) times a day for 23 days   10/14/22 5/3/23 Yes Rob Manriquez MD   atovaquone (MEPRON) 750 mg/5 mL suspension Take 1,500 mg by mouth daily   Yes Historical Provider, MD   azithromycin (ZITHROMAX) 250 mg tablet Take 250 mg by mouth 3 (three) times a week 1/6/23  Yes Historical Provider, MD   Cholecalciferol (Vitamin D3) 125 MCG (5000 UT) TABS Take 5,000 Units by mouth daily   Yes Historical Provider, MD   famotidine (PEPCID) 40 MG tablet TAKE 1 TABLET BY MOUTH EVERY DAY 3/15/23  Yes Carrie Hoover MD   gureefenesin-codeine (GUAIFENESIN AC) 100-10 MG/5ML liquid Take 5 mL by mouth 3 (three) times a day as needed for cough 12/27/22  Yes Dionicio Lund MD   oxyCODONE (ROXICODONE) 10 MG TABS Take 1 tablet (10 mg total) by mouth every 8 (eight) hours as needed for severe pain Max Daily Amount: 30 mg 4/3/23  Yes Ruslan Lei MD   pancrelipase, Lip-Prot-Amyl, (CREON) 24,000 units Take 48,000 Units by mouth 3 (three) times a day with meals   Yes Historical Provider, MD   predniSONE 10 mg tablet Take 10 mg by mouth daily 4/27/21  Yes Historical Provider, MD   pregabalin (LYRICA) 150 mg capsule Take 1 capsule (150 mg total) by mouth 2 (two) times a day 2/27/23  Yes Ruslan Lei MD   Respiratory Therapy Supplies (Nebulizer) SUKHI Please dispense nebulizer machine 7/16/21  Yes Historical Provider, MD   tacrolimus (PROGRAF) 0 5 mg capsule  12/21/22  Yes Historical Provider, MD   tacrolimus (PROGRAF) 1 mg capsule Take 0 5 mg by mouth 2 (two) times a day 7/1/21  Yes Historical Provider, MD   valACYclovir (VALTREX) 500 mg tablet Take 500 mg by mouth daily 6/1/21  Yes Historical Provider, MD   vitamin B-12 (VITAMIN B-12) 1,000 mcg tablet Take by mouth daily   Yes Historical Provider, MD   BD Pen Needle Abi U/F 32G X 4 MM MISC 4 times daily 11/8/22   Aleyda Clayton MD   Continuous Blood Gluc  (Dexcom G6 ) SUKHI Use 1 Units continuous 2/21/23   Aleyda Clayton MD   Continuous Blood Gluc Sensor (Dexcom G6 Sensor) MISC Use 1 Units every 7 days 2/21/23   Aleyda Clayton MD   Continuous Blood Gluc Transmit (Dexcom G6 Transmitter) MISC Use 1 Units continuous 2/21/23   Aleyda Clayton MD   dexlansoprazole (DEXILANT) 60 MG capsule Take 1 capsule (60 mg total) by mouth in the morning    Patient not taking: Reported on 5/3/2023 5/24/22   Leola Coffman MD   dronabinol (MARINOL) 5 MG capsule TAKE 1 CAPSULE (5 MG TOTAL) BY MOUTH 3 (THREE) TIMES A DAY 8/19/21   Mario Mcknight MD   eszopiclone (LUNESTA) 1 mg tablet Take 1 tablet (1 mg total) by mouth daily at bedtime as needed for sleep Take immediately before bedtime 2/22/23 Júnior Ventura MD   insulin aspart (NovoLOG FlexPen) 100 UNIT/ML injection pen Inject 4 units three times a day with meals  Patient taking differently: Inject according to sliding scale 5 am 7 lunch and 9 at dinner and ss for greater than 150 12/27/22   Júnior Ventura MD   Insulin Glargine Solostar 100 UNIT/ML SOPN INJECT 5 UNITS UNDER THE SKIN TWICE DAILY 2/27/23   Lakshmi Ascencio MD   Insulin Pen Needle (Comfort EZ Pen Needles) 33G X 4 MM MISC Use to inject insulin 4 times a day 11/3/22   Lakshmi Ascencio MD   medroxyPROGESTERone (DEPO-PROVERA) 150 mg/mL injection Inject 1 mL (150 mg total) into a muscle every 3 (three) months  Patient not taking: Reported on 5/3/2023 11/1/22   Joy Cole MD   metoclopramide (Reglan) 10 mg tablet Take 1 tablet (10 mg total) by mouth 3 (three) times a day  Patient not taking: Reported on 5/3/2023 1/31/23   Sea Watson MD   mupirocin (BACTROBAN) 2 % ointment Apply topically 3 (three) times a day  Patient not taking: Reported on 5/3/2023 3/28/23   Elsa Stark MD   naloxone [facility]) 4 mg/0 1 mL nasal spray 4 mg into each nostril  Patient not taking: Reported on 5/3/2023 6/6/22   Historical Provider, MD   ondansetron (ZOFRAN) 4 mg tablet Take 1 tablet (4 mg total) by mouth every 8 (eight) hours as needed for nausea or vomiting 3/29/23   MD Adrianne Stein yfgn, 100 UNIT/ML SOPN Inject 0 12 mL (12 Units total) as directed daily at bedtime 12 units hs 3/22/23   Lakshmi Ascencio MD   sodium chloride (OCEAN) 0 65 % nasal spray 2 sprays into each nostril 2 (two) times a day  Patient not taking: Reported on 5/3/2023 11/1/22   Júnior Ventura MD   cyclobenzaprine (FLEXERIL) 5 mg tablet Take 1-2 tabs every 8 hours as needed for muscle pain/spasm  Patient not taking: No sig reported 8/1/22 11/8/22  Júnior Ventura MD   sucralfate (CARAFATE) 1 g/10 mL suspension Take 10 mL (1 g total) by mouth 4 (four) times a day (with meals and at bedtime)  Patient not taking: Reported on 10/28/2022 8/1/22 11/8/22  Bisi Fotnana MD   traZODone (DESYREL) 50 mg tablet Take 0 5 tablets (25 mg total) by mouth daily at bedtime 9/26/21 11/8/22  WAYNE Garibay     I have reviewed home medications with patient personally  Allergies:    Allergies   Allergen Reactions   • Bebtelovimab Anaphylaxis   • Vancomycin Angioedema and Hives   • Gabapentin Hallucinations   • Ceftazidime Headache     Severe headaches after desensitization   • Nsaids Other (See Comments)     Lung transplant increases risk of renal toxicity, call lung txp provider to discuss if needed       Social History:  Marital Status: /Civil Union     Substance Use History:   Social History     Substance and Sexual Activity   Alcohol Use Yes   • Alcohol/week: 2 0 standard drinks   • Types: 2 Glasses of wine per week    Comment: social     Social History     Tobacco Use   Smoking Status Former   • Packs/day: 1 00   • Years: 10 00   • Pack years: 10 00   • Types: Cigarettes   Smokeless Tobacco Never     Social History     Substance and Sexual Activity   Drug Use Yes   • Frequency: 7 0 times per week   • Types: Marijuana, Oxycodone    Comment: medical edible marijuana prescribed       Family History:  Family History   Problem Relation Age of Onset   • Alcohol abuse Mother    • Mental illness Mother    • Depression Mother    • Alcohol abuse Father    • COPD Maternal Grandmother    • Lung cancer Maternal Grandmother 72   • Cancer Maternal Grandmother    • COPD Maternal Grandfather    • Cancer Maternal Grandfather    • COPD Paternal Grandmother    • Lung cancer Paternal Grandmother 72   • No Known Problems Sister    • No Known Problems Paternal Aunt    • Breast cancer Neg Hx    • Ovarian cancer Neg Hx    • Colon cancer Neg Hx        Physical Exam:     Vitals:   Blood Pressure: 156/85 (05/03/23 2204)  Pulse: 73 (05/03/23 2204)  Temperature: 98 7 °F (37 1 °C) (05/03/23 2204)  Respirations: 14 (05/03/23 2204)  SpO2: 97 % (05/03/23 2204)    Physical Exam  Constitutional:       Appearance: Normal appearance  She is not ill-appearing  Comments: Chronically ill-appearing, acutely nontoxic-appearing  HENT:      Head: Normocephalic and atraumatic  Eyes:      Pupils: Pupils are equal, round, and reactive to light  Cardiovascular:      Rate and Rhythm: Normal rate  Pulses: Normal pulses  Comments: Note with right sided port: pt reports port is for blood draw due to poor veins  Pulmonary:      Effort: Pulmonary effort is normal  No respiratory distress  Breath sounds: Rhonchi present  Comments: Course voice noted, not in distress, able to speak in full sentences  Rhonchorous left lung sounds noted on exam   O2 saturation 97% on room air  Abdominal:      General: There is no distension  Palpations: Abdomen is soft  Tenderness: There is no abdominal tenderness  Musculoskeletal:      Cervical back: No rigidity  Right lower leg: No edema  Left lower leg: No edema  Neurological:      Mental Status: She is alert and oriented to person, place, and time  Mental status is at baseline     Psychiatric:      Comments: Anxious            Additional Data:     Lab Results:  Results from last 7 days   Lab Units 05/03/23  1429   WBC Thousand/uL 7 25   HEMOGLOBIN g/dL 10 0*   HEMATOCRIT % 33 4*   PLATELETS Thousands/uL 178   NEUTROS PCT % 80*   LYMPHS PCT % 12*   MONOS PCT % 7   EOS PCT % 1     Results from last 7 days   Lab Units 05/03/23  1429   SODIUM mmol/L 127*   POTASSIUM mmol/L 4 2   CHLORIDE mmol/L 95*   CO2 mmol/L 24   BUN mg/dL 22   CREATININE mg/dL 0 99   ANION GAP mmol/L 8   CALCIUM mg/dL 9 0   GLUCOSE RANDOM mg/dL 278*         Results from last 7 days   Lab Units 05/03/23  1759   POC GLUCOSE mg/dl 287*         Results from last 7 days   Lab Units 05/03/23  1429   LACTIC ACID mmol/L 2 1*       Lines/Drains:  Invasive Devices     Central Venous Catheter Line Duration           Port A Cath 11/25/22 Right Chest 159 days          Drain  Duration           Ureteral Drain/Stent Right ureter 6 Fr  1184 days                Central Line:  Goal for removal: port in place           Imaging: Reviewed radiology reports from this admission including: CTA per study report noted  CTA ED chest PE study   Final Result by Sherry Thibodeaux MD (05/03 1659)      No pulmonary embolus  Left lung transplant with tiny focus of groundglass opacity in the left upper lobe, of doubtful significance  Dilated fluid-filled esophagus, of uncertain etiology  Workstation performed: [id number]         XR chest 1 view portable   ED Interpretation by Carolyn Monahan PA-C (05/03 6475)   Right pneumonectomy present with associated shift of heart and trachea to the right  L lung costophrenic angle without blunting, no effusion present, no pneumothorax  Final Result by Pebbles Hernandez MD (05/03 1650)      Right pneumonectomy  No acute cardiopulmonary disease otherwise  Workstation performed: [id number]             EKG and Other Studies Reviewed on Admission:   · EKG: NSR     ** Please Note: This note has been constructed using a voice recognition system   **

## 2023-05-04 NOTE — ASSESSMENT & PLAN NOTE
81-year-old female patient with past medical history of cystic fibrosis, pneumonectomy, lung transplant x2, currently has left-sided 1 functioning lung, history of DVT, PE, currently on Eliquis, previous history of severe pulmonary infection requiring ECMO in the past   Presented with complaining of shortness of breath, thoracic pain with coughing  ED had discussed  [facility]( Her contact information is [phone] at the office and personal cell is [phone]) who recommended patient to be admitted under observation for IV Zosyn due to her history  CTA PE study negative for pulmonary embolism, left lung transplant with tiny focus of groundglass opacity left upper lobe  CBC without leukocytosis    Currently patient appears comfortable nondistressed  O2 saturation 97 percent on room air  Complaining of cough, pain around left side of thorax with coughing  Reports pain improved with IV Dilaudid  Denies fever, chills, nausea, vomiting, abdominal pain, diarrhea, any other illness  Continue IV Zosyn empirically for now  F/u with HS trop trend  Follow-up with pulmonology consult  Monitor procalcitonin trend, monitor CBC with differential in AM   Continue with supportive care

## 2023-05-04 NOTE — QUICK NOTE
Notified by RN, patient complaining of left flank pain  VSS  Ordered Tylenol, lidocaine and one-time dose of Dilaudid as needed for breakthrough pain

## 2023-05-04 NOTE — ASSESSMENT & PLAN NOTE
· Chronic  · Uncontrolled on presentation; 207/82  · Not on any antihypertensive agents at home  · Continue IV Hydralazine for SBP >160  · Now improved; 134/76

## 2023-05-04 NOTE — UTILIZATION REVIEW
Initial Clinical Review      OBS order 5/3 1749 converted to IP on 5/5 0744 for continued pulm workup     Admission: Date/Time/Statement:   Admission Orders (From admission, onward)     Ordered        05/05/23 0744  Inpatient Admission  Once            05/03/23 1749  Place in Observation  Once                       Inpatient Admission     Standing Status:   Standing     Number of Occurrences:   1     Order Specific Question:   Level of Care     Answer:   Med Surg [16]     Order Specific Question:   Estimated length of stay     Answer:   More than 2 Midnights     Order Specific Question:   Certification     Answer:   I certify that inpatient services are medically necessary for this patient for a duration of greater than two midnights  See H&P and MD Progress Notes for additional information about the patient's course of treatment  ED Arrival Information     Expected   -    Arrival   5/3/2023 13:36    Acuity   Emergent            Means of arrival   Walk-In    Escorted by   Family Member    Service   Hospitalist    Admission type   Emergency            Arrival complaint   Chest Pain,SOB            Chief Complaint   Patient presents with   • Shortness of Breath     Pt reports SOB and chest pain x 3 days, pt is a lung transplant patient with 1 L lung at this time  Pt reports she is coughing and congestion  Cystic fibrosis pt, productive cough/wheezing with green phlegm        Initial Presentation: 41 y o  female to ED from home w/ cough, shortness of breath of 3 days duration  PMH of cystic fibrosis, history of lung transplant x2, pneumonectomy, currently has 1 left-sided functioning lung, chronically on immunosuppressants, chronic opioid use, anxiety, history of DVT, PE currently on Eliquis, anemia, asthma, diabetes type 1, pulmonary hypertension, protein calorie moderation  CTA PE study negative for pulmonary embolism, left lung transplant with tiny focus of groundglass opacity left upper lobe  O2 sat 97 % RA   C/o cough , pain L sided thorax w/ coughing   Na 127  Admitted OBS status for iv zosyn , pulm consult , pct trend , monitor cbc , cont supportive care   Monitor BMP   Hx PE cont eliquis   HTN IV hydralazine prn   DM SSI and monitor  Resume home dose of atovaquone, Prograf, Valtrex, prednisone s/p pneumonectomy   PE: rhonchi , course voice noted   anxious    5/4 IM Note   cont IV zosyn , pulm consult   Pain control   Robitussin and nebs   Monitor BMP , improved to 132  Hurts w/ cough and hard to get sputum out   5/4 Pulm Consult   Abnormal CT chest cough w/ pleuritic pain x1 day   Cont zosyn   F/u sputum cx   Cont duonebs   Cont robitussin , nebs   5/5 IM Note   Cont IV zosyn   Na improved to 132 and now dropped to 127 again   BP improved to 140/80    5/5 Pulm Note  Cont w/ pain L side   Cough improved and is becoming dry at times  Abnormal chest CT   CT scan shows a tiny focus of groundglass opacity in the left upper lobe, possible developing infectious process  Sputum cx   If worsens transfer to [facility]    Cont atovaquone, prednisone, Prograf, valacyclovir, Creon  DuoNeb 3 times per day      ED Triage Vitals   Temperature Pulse Respirations Blood Pressure SpO2   05/03/23 1338 05/03/23 1338 05/03/23 1338 05/03/23 1338 05/03/23 1338   97 8 °F (36 6 °C) 81 16 (!) 207/82 100 %      Temp Source Heart Rate Source Patient Position - Orthostatic VS BP Location FiO2 (%)   05/04/23 0138 05/03/23 1338 05/03/23 1338 05/03/23 1338 --   Oral Monitor Sitting Left arm       Pain Score       05/03/23 1452       7          Wt Readings from Last 1 Encounters:   05/04/23 52 2 kg (115 lb)     Additional Vital Signs:   05/05/23 0823 -- -- -- -- -- -- None (Room air) --   05/05/23 07:54:38 99 4 °F (37 4 °C) 77 -- 140/80 100 95 % -- --   05/05/23 0726 -- -- -- -- -- 97 % None (Room air) --   05/04/23 2325 97 8 °F (36 6 °C) 69 18 140/81 101 97 % -- --   05/04/23 2001 -- -- -- -- -- 96 % None (Room air) --   05/04/23 16:06:31 -- 76 18 154/89 111 96 % None (Room air) --   05/04/23 15:31:05 -- 81 -- 166/94 118 99 % -- --   05/04/23 14:29:31 97 6 °F (36 4 °C) 74 16 180/99 Abnormal  126 100 % None (Room air) --   05/04/23 1423 -- -- -- -- -- 97 % None (Room air) --   05/04/23 0831 -- -- -- -- -- 96 % None (Room air) --   05/04/23 0746 -- -- -- -- -- 97 % None (Room air) --   05/04/23 07:35:15 98 6 °F (37 °C) 64 16 134/76 95 97 % None (Room         05/04/23 0138 98 7 °F (37 1 °C) 77 16 156/85 -- -- None (Room air) --   05/03/23 2333 -- 77 16 -- -- 100 % -- --   05/03/23 2332 -- -- -- -- -- 100 % None (Room air) --   05/03/23 2330 -- -- -- -- -- 100 % -- --   05/03/23 22:04:20 98 7 °F (37 1 °C) 73 14 156/85 109 97 % -- --   05/03/23 20:38:05 98 8 °F (37 1 °C) 79 14 161/88 112 98 % -- --   05/03/23 18:26:39 98 6 °F (37 °C) -- -- 143/84 104 -- -- --   05/03/23 1800 -- 89 20 135/82 103 96 % None (Room air) --   05/03/23 1700 -- 90 19 140/80 102 96 % None (Room air) --   05/03/23 1600 -- 84 21 126/74 95 100 % None (Room air) --   05/03/23 1502 -- -- -- -- -- 98 % None (Room air) --   05/03/23 1500 -- 73 20 154/95 119 97 % None (Room air) --   05/03/23 1400 -- 80 21 187/96 Abnormal  133 100 % None (Room air)        Pertinent Labs/Diagnostic Test Results:   5/3 EKG NSR   CTA ED chest PE study   Final Result by Melida Muse MD (05/03 1659)      No pulmonary embolus  Left lung transplant with tiny focus of groundglass opacity in the left upper lobe, of doubtful significance  Dilated fluid-filled esophagus, of uncertain etiology  Workstation performed: [id number]         XR chest 1 view portable   ED Interpretation by Carolyn Murray PA-C (05/03 5344)   Right pneumonectomy present with associated shift of heart and trachea to the right  L lung costophrenic angle without blunting, no effusion present, no pneumothorax  Final Result by Curt Garza MD (05/03 7720)      Right pneumonectomy        No acute cardiopulmonary disease otherwise  Workstation performed: [id number]           Results from last 7 days   Lab Units 05/03/23  1429   SARS-COV-2  Negative     Results from last 7 days   Lab Units 05/05/23  0553 05/04/23  0817 05/03/23  1429   WBC Thousand/uL 4 67 4 81 7 25   HEMOGLOBIN g/dL 8 8* 9 0* 10 0*   HEMATOCRIT % 29 5* 30 3* 33 4*   PLATELETS Thousands/uL 179 176 178   NEUTROS ABS Thousands/µL  --   --  5 79       Results from last 7 days   Lab Units 05/05/23  0553 05/04/23  0817 05/04/23  0559 05/03/23  1429   SODIUM mmol/L 127* 132*  --  127*   POTASSIUM mmol/L 4 2 4 4  --  4 2   CHLORIDE mmol/L 95* 101  --  95*   CO2 mmol/L 29 27  --  24   ANION GAP mmol/L 3* 4  --  8   BUN mg/dL 18 21  --  22   CREATININE mg/dL 0 88 0 90  --  0 99   EGFR ml/min/1 73sq m 79 77  --  69   CALCIUM mg/dL 8 9 8 7  --  9 0   MAGNESIUM mg/dL 1 8*  --  2 1  --      Results from last 7 days   Lab Units 05/05/23  0752 05/04/23  2141 05/04/23  1619 05/04/23  1127 05/04/23  0733 05/04/23  0637 05/03/23  2210 05/03/23  1759   POC GLUCOSE mg/dl 127 282* 249* 136 120 120 315* 287*     Results from last 7 days   Lab Units 05/05/23  0553 05/04/23  0817 05/03/23  1429   GLUCOSE RANDOM mg/dL 141* 119 278*       Results from last 7 days   Lab Units 05/04/23  0820 05/04/23  0559 05/03/23  2254 05/03/23  1429   HS TNI 0HR ng/L  --   --  5 4   HS TNI 2HR ng/L  --  5  --   --    HSTNI D2 ng/L  --  0  --   --    HS TNI 4HR ng/L 6  --   --   --    HSTNI D4 ng/L 1  --   --   --      Results from last 7 days   Lab Units 05/04/23  0559 05/03/23  2254   PROCALCITONIN ng/ml 0 07 0 05     Results from last 7 days   Lab Units 05/03/23  2254 05/03/23  1429   LACTIC ACID mmol/L 1 8 2 1*       Results from last 7 days   Lab Units 05/03/23  1429   INFLUENZA A PCR  Negative   INFLUENZA B PCR  Negative   RSV PCR  Negative     Results from last 7 days   Lab Units 05/03/23  1446 05/03/23  1444   BLOOD CULTURE  No Growth at 24 hrs   No Growth at 24 hrs  ED Treatment:   Medication Administration from 05/03/2023 1336 to 05/03/2023 1817       Date/Time Order Dose Route Action     05/03/2023 1502 EDT albuterol inhalation solution 10 mg 10 mg Nebulization Given     05/03/2023 1502 EDT ipratropium (ATROVENT) 0 02 % inhalation solution 1 mg 1 mg Nebulization Given     05/03/2023 1502 EDT sodium chloride 0 9 % inhalation solution 3 mL 3 mL Nebulization Given     05/03/2023 1452 EDT HYDROmorphone HCl (DILAUDID) injection 0 2 mg 0 2 mg Intravenous Given     05/03/2023 1546 EDT HYDROmorphone (DILAUDID) injection 0 5 mg 0 5 mg Intravenous Given     05/03/2023 1606 EDT piperacillin-tazobactam (ZOSYN) 3 375 g in sodium chloride 0 9 % 100 mL IVPB 3 375 g Intravenous New Bag     05/03/2023 1634 EDT sodium chloride tablet 1 g 1 g Oral Given     05/03/2023 1737 EDT HYDROmorphone (DILAUDID) injection 0 5 mg 0 5 mg Intravenous Given     05/03/2023 1759 EDT insulin lispro (HumaLOG) 100 units/mL subcutaneous injection 9 Units 9 Units Subcutaneous Given        Past Medical History:   Diagnosis Date   • ABPA (allergic bronchopulmonary aspergillosis) ([facility] ) 07/08/2012   • Acute kidney injury ([facility] ) 09/08/2017   • Acute on chronic respiratory failure ([facility] ) 03/04/2017    Last Assessment & Plan:  Formatting of this note might be different from the original  Recurrent with clinical worsening with enlarging PTX, s/p multiple chest tubes, but new fevers 6/19 and worsening leukocytosis indicating infection as cause of 6/19 decompensation with profound hypoxia in setting of shunting through right lung and continued pneumothorax s/p 2nd chest tube placement    6/27: Right   • Anaphylaxis 06/06/2022   • Asthma    • Chest pain 09/21/2021   • Chronic pain    • Chronic respiratory failure with hypoxia ([facility] ) 09/07/2021   • COVID-19 06/03/2022   • Cystic fibrosis ([facility] )    • Diabetes mellitus ([facility] )    • GERD (gastroesophageal reflux disease)    • HBP (high blood pressure)    • HCAP (healthcare-associated pneumonia) 03/06/2020   • Influenza B 02/23/2020   • Kidney stone    • Neutropenic fever ([place] [address] ) 09/26/2020   • Pneumonia due to Pseudomonas species ([place] [address] ) 10/28/2010    Last Assessment & Plan:  Formatting of this note might be different from the original  Polymicrobial pna w/ pseudomonas, M   Abscessus, Cadida glabrata/albicans   • PONV (postoperative nausea and vomiting)    • Pseudomonas aeruginosa infection 09/20/2012   • Squamous cell skin cancer 01/12/2023    SCCIS- Left upper thigh   • Transplant recipient     Lung- 2013, 2017   • Wellness examination 02/04/2013    Formatting of this note might be different from the original  Discussed with patient and acknowledgement form signed on 2/4/2013 (ALYSHA)     Present on Admission:  • Type 1 diabetes ([place] [address] )  • Essential hypertension  • Pancreatic insufficiency  • Pulmonary embolus (HCC)  • Acute bronchitis      Admitting Diagnosis: Shortness of breath [R06 02]  Hyponatremia [E87 1]  SOB (shortness of breath) [R06 02]  Cystic fibrosis ([place] [address] ) [E84 9]  Transplant recipient [Z94 89]  Chest pain, musculoskeletal [R07 89]  S/P pneumonectomy [Z90 2]  Type 1 diabetes mellitus with diabetic chronic kidney disease, unspecified CKD stage ([place] [address] ) [E10 22]  Age/Sex: 41 y o  female  Admission Orders:  Scheduled Medications:  acetaminophen, 975 mg, Oral, Q8H DOTTY  apixaban, 5 mg, Oral, BID  atovaquone, 1,500 mg, Oral, Daily  dronabinol, 5 mg, Oral, TID  famotidine, 40 mg, Oral, Daily  insulin glargine, 10 Units, Subcutaneous, HS  insulin lispro, 1-5 Units, Subcutaneous, TID AC  insulin lispro, 3 Units, Subcutaneous, TID With Meals  ipratropium-albuterol, 3 mL, Nebulization, TID  lidocaine, 1 patch, Topical, Daily  pancrelipase (Lip-Prot-Amyl), 48,000 Units, Oral, TID With Meals  piperacillin-tazobactam, 3 375 g, Intravenous, Q8H  predniSONE, 10 mg, Oral, Daily  pregabalin, 150 mg, Oral, BID  tacrolimus, 0 5 mg, Oral, BID  valACYclovir, 500 mg, Oral, HS      Continuous IV Infusions:     PRN Meds:  acetaminophen, 650 mg, Oral, Q6H PRN  albuterol, 2 puff, Inhalation, Q4H PRN  ALPRAZolam, 1 mg, Oral, HS PRN  dextromethorphan-guaiFENesin, 5 mL, Oral, Q4H PRN  hydrALAZINE, 5 mg, Intravenous, Q6H PRN  HYDROmorphone, 0 5 mg, Intravenous, Q4H PRN   5/3  x1  ondansetron, 4 mg, Oral, Q6H PRN  oxyCODONE, 10 mg, Oral, Q6H PRN   Or  oxyCODONE, 15 mg, Oral, Q6H PRN  zolpidem, 2 5 mg, Oral, HS PRN  Hydromorphone 1 mg IV q4hr 5/4 x2  5/5 x2    Fingerstick ac and hs   Act as edilma   Up and OOB       IP CONSULT TO PULMONOLOGY    Network Utilization Review Department  ATTENTION: Please call with any questions or concerns to [phone] and carefully listen to the prompts so that you are directed to the right person  All voicemails are confidential   Nikolay Phillips all requests for admission clinical reviews, approved or denied determinations and any other requests to dedicated fax number below belonging to the campus where the patient is receiving treatment   List of dedicated fax numbers for the Facilities:  [address] DENIALS (Administrative/Medical Necessity) [phone]   [address] (Maternity/NICU/Pediatrics) [phone]   [address] [phone]   [facility]  [phone]   [address] [place] [address] [address] [place] [place] [address] [phone]   [address] [place] [address] [phone]

## 2023-05-04 NOTE — ASSESSMENT & PLAN NOTE
Lab Results   Component Value Date    HGBA1C 8 5 02/17/2023       Recent Labs     05/03/23  1759   POCGLU 287*       Blood Sugar Average: Last 72 hrs:  (P) 287     Present on admission history of type 1 diabetes  Patient is on long-acting insulin 12 units at bedtime, short-acting 4 to 5 units depending on sliding-scale coverage CAD with meals  Continue with diabetic diet, sliding-scale coverage

## 2023-05-04 NOTE — ASSESSMENT & PLAN NOTE
· Presented with sodium of 127  · Suspected pseudohyponatremia due to hyperglycemia  · Patient did receive 1 dose of salt tablet in the emergency room  · Improved to 132 this morning  · Monitor BMP

## 2023-05-04 NOTE — ASSESSMENT & PLAN NOTE
Presented with sodium of 127  Suspected pseudohyponatremia due to hyperglycemia  Patient did receive 1 dose of salt tablet in the emergency room  Monitor BMP

## 2023-05-05 LAB
ANION GAP SERPL CALCULATED.3IONS-SCNC: 3 MMOL/L (ref 4–13)
BUN SERPL-MCNC: 18 MG/DL (ref 5–25)
CALCIUM SERPL-MCNC: 8.9 MG/DL (ref 8.4–10.2)
CHLORIDE SERPL-SCNC: 95 MMOL/L (ref 96–108)
CO2 SERPL-SCNC: 29 MMOL/L (ref 21–32)
CREAT SERPL-MCNC: 0.88 MG/DL (ref 0.6–1.3)
ERYTHROCYTE [DISTWIDTH] IN BLOOD BY AUTOMATED COUNT: 19.6 % (ref 11.6–15.1)
GFR SERPL CREATININE-BSD FRML MDRD: 79 ML/MIN/1.73SQ M
GLUCOSE P FAST SERPL-MCNC: 141 MG/DL (ref 65–99)
GLUCOSE SERPL-MCNC: 127 MG/DL (ref 65–140)
GLUCOSE SERPL-MCNC: 134 MG/DL (ref 65–140)
GLUCOSE SERPL-MCNC: 141 MG/DL (ref 65–140)
GLUCOSE SERPL-MCNC: 193 MG/DL (ref 65–140)
GLUCOSE SERPL-MCNC: 280 MG/DL (ref 65–140)
HCT VFR BLD AUTO: 29.5 % (ref 34.8–46.1)
HGB BLD-MCNC: 8.8 G/DL (ref 11.5–15.4)
MAGNESIUM SERPL-MCNC: 1.8 MG/DL (ref 1.9–2.7)
MCH RBC QN AUTO: 22.2 PG (ref 26.8–34.3)
MCHC RBC AUTO-ENTMCNC: 29.8 G/DL (ref 31.4–37.4)
MCV RBC AUTO: 74 FL (ref 82–98)
OSMOLALITY UR: 213 MMOL/KG
PLATELET # BLD AUTO: 179 THOUSANDS/UL (ref 149–390)
PMV BLD AUTO: 9.8 FL (ref 8.9–12.7)
POTASSIUM SERPL-SCNC: 4.2 MMOL/L (ref 3.5–5.3)
RBC # BLD AUTO: 3.97 MILLION/UL (ref 3.81–5.12)
SODIUM 24H UR-SCNC: 28 MOL/L
SODIUM SERPL-SCNC: 127 MMOL/L (ref 135–147)
WBC # BLD AUTO: 4.67 THOUSAND/UL (ref 4.31–10.16)

## 2023-05-05 RX ORDER — INSULIN LISPRO 100 [IU]/ML
9 INJECTION, SOLUTION INTRAVENOUS; SUBCUTANEOUS
Status: DISCONTINUED | OUTPATIENT
Start: 2023-05-05 | End: 2023-05-07 | Stop reason: HOSPADM

## 2023-05-05 RX ORDER — INSULIN LISPRO 100 [IU]/ML
4 INJECTION, SOLUTION INTRAVENOUS; SUBCUTANEOUS
Status: DISCONTINUED | OUTPATIENT
Start: 2023-05-06 | End: 2023-05-07 | Stop reason: HOSPADM

## 2023-05-05 RX ORDER — POLYETHYLENE GLYCOL 3350 17 G/17G
17 POWDER, FOR SOLUTION ORAL DAILY
Status: DISCONTINUED | OUTPATIENT
Start: 2023-05-05 | End: 2023-05-06

## 2023-05-05 RX ORDER — INSULIN LISPRO 100 [IU]/ML
6 INJECTION, SOLUTION INTRAVENOUS; SUBCUTANEOUS
Status: DISCONTINUED | OUTPATIENT
Start: 2023-05-06 | End: 2023-05-07 | Stop reason: HOSPADM

## 2023-05-05 RX ADMIN — VALACYCLOVIR HYDROCHLORIDE 500 MG: 500 TABLET, FILM COATED ORAL at 21:08

## 2023-05-05 RX ADMIN — PIPERACILLIN AND TAZOBACTAM 3.38 G: 36; 4.5 INJECTION, POWDER, FOR SOLUTION INTRAVENOUS at 08:20

## 2023-05-05 RX ADMIN — HYDROMORPHONE HYDROCHLORIDE 1 MG: 1 INJECTION, SOLUTION INTRAMUSCULAR; INTRAVENOUS; SUBCUTANEOUS at 21:08

## 2023-05-05 RX ADMIN — PANCRELIPASE 48000 UNITS: 24000; 76000; 120000 CAPSULE, DELAYED RELEASE PELLETS ORAL at 08:19

## 2023-05-05 RX ADMIN — ACETAMINOPHEN 975 MG: 325 TABLET ORAL at 13:47

## 2023-05-05 RX ADMIN — HYDROMORPHONE HYDROCHLORIDE 1 MG: 1 INJECTION, SOLUTION INTRAMUSCULAR; INTRAVENOUS; SUBCUTANEOUS at 08:23

## 2023-05-05 RX ADMIN — HYDROMORPHONE HYDROCHLORIDE 1 MG: 1 INJECTION, SOLUTION INTRAMUSCULAR; INTRAVENOUS; SUBCUTANEOUS at 02:39

## 2023-05-05 RX ADMIN — IPRATROPIUM BROMIDE AND ALBUTEROL SULFATE 3 ML: 2.5; .5 SOLUTION RESPIRATORY (INHALATION) at 07:26

## 2023-05-05 RX ADMIN — OXYCODONE HYDROCHLORIDE 15 MG: 5 TABLET ORAL at 10:38

## 2023-05-05 RX ADMIN — HYDROMORPHONE HYDROCHLORIDE 1 MG: 1 INJECTION, SOLUTION INTRAMUSCULAR; INTRAVENOUS; SUBCUTANEOUS at 16:50

## 2023-05-05 RX ADMIN — IPRATROPIUM BROMIDE AND ALBUTEROL SULFATE 3 ML: 2.5; .5 SOLUTION RESPIRATORY (INHALATION) at 13:57

## 2023-05-05 RX ADMIN — PREDNISONE 10 MG: 10 TABLET ORAL at 08:20

## 2023-05-05 RX ADMIN — INSULIN LISPRO 3 UNITS: 100 INJECTION, SOLUTION INTRAVENOUS; SUBCUTANEOUS at 12:12

## 2023-05-05 RX ADMIN — PANCRELIPASE 48000 UNITS: 24000; 76000; 120000 CAPSULE, DELAYED RELEASE PELLETS ORAL at 12:12

## 2023-05-05 RX ADMIN — PIPERACILLIN AND TAZOBACTAM 3.38 G: 36; 4.5 INJECTION, POWDER, FOR SOLUTION INTRAVENOUS at 23:30

## 2023-05-05 RX ADMIN — PREGABALIN 150 MG: 75 CAPSULE ORAL at 17:01

## 2023-05-05 RX ADMIN — ATOVAQUONE 1500 MG: 750 SUSPENSION ORAL at 08:22

## 2023-05-05 RX ADMIN — INSULIN LISPRO 3 UNITS: 100 INJECTION, SOLUTION INTRAVENOUS; SUBCUTANEOUS at 08:22

## 2023-05-05 RX ADMIN — DRONABINOL 5 MG: 2.5 CAPSULE ORAL at 15:03

## 2023-05-05 RX ADMIN — PIPERACILLIN AND TAZOBACTAM 3.38 G: 36; 4.5 INJECTION, POWDER, FOR SOLUTION INTRAVENOUS at 00:35

## 2023-05-05 RX ADMIN — ACETAMINOPHEN 975 MG: 325 TABLET ORAL at 05:51

## 2023-05-05 RX ADMIN — INSULIN GLARGINE 10 UNITS: 100 INJECTION, SOLUTION SUBCUTANEOUS at 21:09

## 2023-05-05 RX ADMIN — ALPRAZOLAM 1 MG: 0.5 TABLET ORAL at 22:40

## 2023-05-05 RX ADMIN — INSULIN LISPRO 3 UNITS: 100 INJECTION, SOLUTION INTRAVENOUS; SUBCUTANEOUS at 18:30

## 2023-05-05 RX ADMIN — APIXABAN 5 MG: 5 TABLET, FILM COATED ORAL at 08:20

## 2023-05-05 RX ADMIN — APIXABAN 5 MG: 5 TABLET, FILM COATED ORAL at 17:01

## 2023-05-05 RX ADMIN — PANCRELIPASE 48000 UNITS: 24000; 76000; 120000 CAPSULE, DELAYED RELEASE PELLETS ORAL at 16:51

## 2023-05-05 RX ADMIN — INSULIN LISPRO 9 UNITS: 100 INJECTION, SOLUTION INTRAVENOUS; SUBCUTANEOUS at 19:16

## 2023-05-05 RX ADMIN — DRONABINOL 5 MG: 2.5 CAPSULE ORAL at 08:20

## 2023-05-05 RX ADMIN — POLYETHYLENE GLYCOL 3350 17 G: 17 POWDER, FOR SOLUTION ORAL at 19:13

## 2023-05-05 RX ADMIN — IPRATROPIUM BROMIDE AND ALBUTEROL SULFATE 3 ML: 2.5; .5 SOLUTION RESPIRATORY (INHALATION) at 19:41

## 2023-05-05 RX ADMIN — FAMOTIDINE 40 MG: 20 TABLET, FILM COATED ORAL at 08:20

## 2023-05-05 RX ADMIN — PIPERACILLIN AND TAZOBACTAM 3.38 G: 36; 4.5 INJECTION, POWDER, FOR SOLUTION INTRAVENOUS at 16:50

## 2023-05-05 RX ADMIN — LIDOCAINE 1 PATCH: 50 PATCH CUTANEOUS at 08:22

## 2023-05-05 RX ADMIN — TACROLIMUS 0.5 MG: 0.5 CAPSULE ORAL at 08:20

## 2023-05-05 RX ADMIN — INSULIN LISPRO 1 UNITS: 100 INJECTION, SOLUTION INTRAVENOUS; SUBCUTANEOUS at 18:29

## 2023-05-05 RX ADMIN — PREGABALIN 150 MG: 75 CAPSULE ORAL at 08:20

## 2023-05-05 RX ADMIN — OXYCODONE HYDROCHLORIDE 15 MG: 5 TABLET ORAL at 23:28

## 2023-05-05 RX ADMIN — TACROLIMUS 0.5 MG: 0.5 CAPSULE ORAL at 17:01

## 2023-05-05 RX ADMIN — OXYCODONE HYDROCHLORIDE 15 MG: 5 TABLET ORAL at 15:03

## 2023-05-05 RX ADMIN — ACETAMINOPHEN 975 MG: 325 TABLET ORAL at 21:08

## 2023-05-05 RX ADMIN — HYDROMORPHONE HYDROCHLORIDE 1 MG: 1 INJECTION, SOLUTION INTRAMUSCULAR; INTRAVENOUS; SUBCUTANEOUS at 12:20

## 2023-05-05 RX ADMIN — OXYCODONE HYDROCHLORIDE 15 MG: 5 TABLET ORAL at 19:12

## 2023-05-05 NOTE — ASSESSMENT & PLAN NOTE
· Plan to continue antibiotics today  · Presented with complaining of shortness of breath, thoracic pain with coughing  · ED had discussed case with patient's CRNP at [facility] who recommended patient to be admitted under observation for IV Zosyn due to her history  · Concern would be for possible gram negative PNA in an immunocompromised patient with cystic fibrosis s/p lung transplant   · CTA Chest (5/3/23): Negative for pulmonary embolism, left lung transplant with tiny focus of groundglass opacity left upper lobe    · Continue pain management; Robitussin for cough, Nebs  · Pulmonology consult, appreciate input  · They have been in contact with [facility] team regarding care  · Recommending to continue IV Zosyn 3 375 every 8 hours for presumptive pneumonia in setting of cystic fibrosis/right pneumonectomy 1

## 2023-05-05 NOTE — CASE MANAGEMENT
Case Management Discharge Planning Note    Patient name Radha Scott  Location [address] 316/-[id number] MRN [id number]  : 1975 Date 2023       Current Admission Date: 5/3/2023  Current Admission Diagnosis:Acute bronchitis   Patient Active Problem List    Diagnosis Date Noted   • Hyponatremia 2023   • Abdominal wall lump 2023   • Transplant recipient    • Acute bronchitis 2022   • Pulmonary embolus ([address] ) 2022   • Right leg pain 10/13/2022   • Chronic cough 2022   • Renal calculus 2022   • Pulmonary nodules 2022   • Rib pain 2022   • Abnormal uterine bleeding (AUB) 2022   • Gastroparesis 2022   • Ulcerative pancolitis without complication ([address] )    • Neuropathic pain of right foot 01/10/2022   • Moderate protein-calorie malnutrition ([address] ) 2022   • Weakness of right lower extremity 2021   • Pulmonary hypertension ([address] ) 2021   • Cystic fibrosis ([address] ) 2021   • Essential hypertension 2021   • Hypercholesteremia 2021   • Long term current use of bisphosphonates 2020   • Long term current use of systemic steroids 2020   • Osteoporosis 2020   • Chronic pain disorder 2020   • Asthma 2020   • CKD (chronic kidney disease) stage 2, GFR 60-89 ml/min 2019   • Hiatal hernia 2019   • Acute on chronic anemia 2018   • DVT (deep venous thrombosis) ([facility] ) 2018   • Severe protein-calorie malnutrition ([address] ) 2018   • Immunosuppression ([facility] ) 2017   • Transplant rejection 2016   • Colon polyp 2016   • S/P pneumonectomy 2013   • Anxiety 2012   • Type 1 diabetes ([address] ) 10/28/2010   • Pancreatic insufficiency 10/28/2010   • Chronic sinusitis 10/28/2010   • Esophageal reflux 10/28/2010      LOS (days): 0  Geometric Mean LOS (GMLOS) (days): 2 90  Days to GMLOS:2 4     OBJECTIVE:  Risk of Unplanned Readmission Score: 21 79         Current admission status: Inpatient   Preferred Pharmacy:   [id number] [place] [address]  [address]  Phone: [phone] Fax: [facility]  [address] [facility] [address]  Phone: [phone] Fax: [phone]    Primary Care Provider: Dorota Vital MD    Primary Insurance: BLUE CROSS  Secondary Insurance: MEDICARE    DISCHARGE DETAILS:                                          Other Referral/Resources/Interventions Provided:  Referral Comments: D/C anticipated in 24 hrs  Per MD, no post d/c needs identified  CM continues to be available

## 2023-05-05 NOTE — ASSESSMENT & PLAN NOTE
· Presented with sodium of 127  · Patient did receive 1 dose of salt tablet in the emergency room  · Improved to 132; and now it dropping to 127 again  · Urine sodium >20, pending osm, will benefit from salt tabs if urine osm>100  · Monitor BMP

## 2023-05-05 NOTE — PLAN OF CARE
Problem: PAIN - ADULT  Goal: Verbalizes/displays adequate comfort level or baseline comfort level  Description: Interventions:  - Encourage patient to monitor pain and request assistance  - Assess pain using appropriate pain scale  - Administer analgesics based on type and severity of pain and evaluate response  - Implement non-pharmacological measures as appropriate and evaluate response  - Consider cultural and social influences on pain and pain management  - Notify physician/advanced practitioner if interventions unsuccessful or patient reports new pain  Outcome: Progressing     Problem: INFECTION - ADULT  Goal: Absence or prevention of progression during hospitalization  Description: INTERVENTIONS:  - Assess and monitor for signs and symptoms of infection  - Monitor lab/diagnostic results  - Monitor all insertion sites, i e  indwelling lines, tubes, and drains  - Monitor endotracheal if appropriate and nasal secretions for changes in amount and color  - [place] appropriate cooling/warming therapies per order  - Administer medications as ordered  - Instruct and encourage patient and family to use good hand hygiene technique  - Identify and instruct in appropriate isolation precautions for identified infection/condition  Outcome: Progressing  Goal: Absence of fever/infection during neutropenic period  Description: INTERVENTIONS:  - Monitor WBC    Outcome: Progressing     Problem: SAFETY ADULT  Goal: Patient will remain free of falls  Description: INTERVENTIONS:  - Educate patient/family on patient safety including physical limitations  - Instruct patient to call for assistance with activity   - Consult OT/PT to assist with strengthening/mobility   - Keep Call bell within reach  - Keep bed low and locked with side rails adjusted as appropriate  - Keep care items and personal belongings within reach  - Initiate and maintain comfort rounds  - Make Fall Risk Sign visible to staff  - Offer Toileting every 2 Hours, in advance of need  - Initiate/Maintain 2alarm  - Obtain necessary fall risk management equipment: 2  - Apply yellow socks and bracelet for high fall risk patients  - Consider moving patient to room near nurses station  Outcome: Progressing  Goal: Maintain or return to baseline ADL function  Description: INTERVENTIONS:  -  Assess patient's ability to carry out ADLs; assess patient's baseline for ADL function and identify physical deficits which impact ability to perform ADLs (bathing, care of mouth/teeth, toileting, grooming, dressing, etc )  - Assess/evaluate cause of self-care deficits   - Assess range of motion  - Assess patient's mobility; develop plan if impaired  - Assess patient's need for assistive devices and provide as appropriate  - Encourage maximum independence but intervene and supervise when necessary  - Involve family in performance of ADLs  - Assess for home care needs following discharge   - Consider OT consult to assist with ADL evaluation and planning for discharge  - Provide patient education as appropriate  Outcome: Progressing  Goal: Maintains/Returns to pre admission functional level  Description: INTERVENTIONS:  - Perform BMAT or MOVE assessment daily    - Set and communicate daily mobility goal to care team and patient/family/caregiver  - Collaborate with rehabilitation services on mobility goals if consulted  - Perform Range of Motion 2 times a day  - Reposition patient every 2 hours    - Dangle patient 2 times a day  - Stand patient 2 times a day  - Ambulate patient 2 times a day  - Out of bed to chair 2 times a day   - Out of bed for meals 2 times a day  - Out of bed for toileting  - Record patient progress and toleration of activity level   Outcome: Progressing     Problem: DISCHARGE PLANNING  Goal: Discharge to home or other facility with appropriate resources  Description: INTERVENTIONS:  - Identify barriers to discharge w/patient and caregiver  - Arrange for needed discharge resources and transportation as appropriate  - Identify discharge learning needs (meds, wound care, etc )  - Arrange for interpretive services to assist at discharge as needed  - Refer to Case Management Department for coordinating discharge planning if the patient needs post-hospital services based on physician/advanced practitioner order or complex needs related to functional status, cognitive ability, or social support system  Outcome: Progressing     Problem: Knowledge Deficit  Goal: Patient/family/caregiver demonstrates understanding of disease process, treatment plan, medications, and discharge instructions  Description: Complete learning assessment and assess knowledge base    Interventions:  - Provide teaching at level of understanding  - Provide teaching via preferred learning methods  Outcome: Progressing     Problem: RESPIRATORY - ADULT  Goal: Achieves optimal ventilation and oxygenation  Description: INTERVENTIONS:  - Assess for changes in respiratory status  - Assess for changes in mentation and behavior  - Position to facilitate oxygenation and minimize respiratory effort  - Oxygen administered by appropriate delivery if ordered  - Initiate smoking cessation education as indicated  - Encourage broncho-pulmonary hygiene including cough, deep breathe, Incentive Spirometry  - Assess the need for suctioning and aspirate as needed  - Assess and instruct to report SOB or any respiratory difficulty  - Respiratory Therapy support as indicated  Outcome: Progressing

## 2023-05-05 NOTE — ASSESSMENT & PLAN NOTE
· Chronic; Uncontrolled on presentation; 207/82  · Not on any antihypertensive agents at home  · Continue IV Hydralazine for SBP >160  · Now improved; 140/80

## 2023-05-05 NOTE — ASSESSMENT & PLAN NOTE
· Past medical history of cystic fibrosis s/p pneumonectomy x2  · S/p lung transplant  · Currently patient has left-sided functioning lung  · Resume home dose of atovaquone, Prograf, Valtrex, prednisone  · Patient is also on azithromycin 3 times a week which is on hold due to being on Zosyn for now  · Follows at [facility]

## 2023-05-05 NOTE — RESPIRATORY THERAPY NOTE
05/05/23 1359   Respiratory Protocol   Protocol Initiated? No   Protocol Selection Respiratory   Language Barrier? No   Medical & Social History Reviewed? Yes   Diagnostic Studies Reviewed? Yes   Physical Assessment Performed? Yes   Respiratory Plan Home Bronchodilator Patient pathway   Respiratory Assessment   Assessment Type During-treatment   General Appearance Alert; Awake   Respiratory Pattern Dyspnea with exertion   Chest Assessment Chest expansion symmetrical   Bilateral Breath Sounds Diminished;Coarse   Resp Comments pt tolerated tx well   no changes will keep txs as scheduled   O2 Device ra   Additional Assessments   Pulse 77   Respirations 16   SpO2 95 %

## 2023-05-05 NOTE — PROGRESS NOTES
[facility]  Progress Note  Name: Doug Bridges  MRN: [id number]  Unit/Bed#: -91 I Date of Admission: 5/3/2023   Date of Service: 5/5/2023 I Hospital Day: 0    Assessment/Plan   * Acute bronchitis  Assessment & Plan  · Plan to continue antibiotics today  · Presented with complaining of shortness of breath, thoracic pain with coughing  · ED had discussed case with patient's CRNP at [facility]  [address] [facility] who recommended patient to be admitted under observation for IV Zosyn due to her history  · Concern would be for possible gram negative PNA in an immunocompromised patient with cystic fibrosis s/p lung transplant   · CTA Chest (5/3/23): Negative for pulmonary embolism, left lung transplant with tiny focus of groundglass opacity left upper lobe  · Continue pain management; Robitussin for cough, Nebs  · Pulmonology consult, appreciate input  · They have been in contact with [facility] team regarding care  · Recommending to continue IV Zosyn 3 375 every 8 hours for presumptive pneumonia in setting of cystic fibrosis/right pneumonectomy     Hyponatremia  Assessment & Plan  · Presented with sodium of 127  · Patient did receive 1 dose of salt tablet in the emergency room  · Improved to 132; and now it dropping to 127 again  · Urine sodium >20, pending osm, will benefit from salt tabs if urine osm>100  · Monitor BMP  Pulmonary embolus (HCC)  Assessment & Plan  · History of PE, DVT in the past   · Continue Eliquis 5 mg twice daily  Essential hypertension  Assessment & Plan  · Chronic; Uncontrolled on presentation; 207/82  · Not on any antihypertensive agents at home  · Continue IV Hydralazine for SBP >160  · Now improved; 140/80    Pancreatic insufficiency  Assessment & Plan  · Resume home dose of pancreatic enzymes with meals      Type 1 diabetes ([facility] [address] )  Assessment & Plan  • Chronic, uncontrolled, as evidenced by a hemoglobin A1C of 8 5  • Home regimen includes:  o 12 units Glargine daily; with 5 units Humalog with sliding scale  • Continue insulin sliding scale with 10 units Lantus, 3 units Humalog with meals in addition to sliding scale  • Monitor blood glucose AC/HS  • Hypoglycemia protocol  • [facility] diet    S/P pneumonectomy  Assessment & Plan  · Past medical history of cystic fibrosis s/p pneumonectomy x2  · S/p lung transplant  · Currently patient has left-sided functioning lung  · Resume home dose of atovaquone, Prograf, Valtrex, prednisone  · Patient is also on azithromycin 3 times a week which is on hold due to being on Zosyn for now  · Follows at [facility]  VTE Pharmacologic Prophylaxis: VTE Score: 1 Low Risk (Score 0-2) - Encourage Ambulation  Patient Centered Rounds: I performed bedside rounds with nursing staff today  Discussions with Specialists or Other Care Team Provider: yes nursing    Education and Discussions with Family / Patient: offered to call, pt will call herself  Total Time Spent on Date of Encounter in care of patient: 25 minutes This time was spent on one or more of the following: performing physical exam; counseling and coordination of care; obtaining or reviewing history; documenting in the medical record; reviewing/ordering tests, medications or procedures; communicating with other healthcare professionals and discussing with patient's family/caregivers  Current Length of Stay: 0 day(s)  Current Patient Status: Inpatient   Certification Statement: The patient will continue to require additional inpatient hospital stay due to managment of hyponatremia  Discharge Plan: Anticipate discharge tomorrow to home      Code Status: Level 1 - Full Code    Subjective:   Seen and examined at bedside  Feeling better than on admission  Still feels some chest discomfort      Objective:     Vitals:   Temp (24hrs), Av 6 °F (37 °C), Min:97 8 °F (36 6 °C), Max:99 4 °F (37 4 °C)    Temp:  [97 8 °F (36 6 °C)-99 4 °F (37 4 °C)] 99 4 °F (37 4 °C)  HR:  [69-77] 77  Resp:  [16-18] 16  BP: (140-154)/(80-89) 140/80  SpO2:  [95 %-97 %] 95 %  Body mass index is 21 03 kg/m²  Input and Output Summary (last 24 hours): Intake/Output Summary (Last 24 hours) at 5/5/2023 1543  Last data filed at 5/4/2023 1816  Gross per 24 hour   Intake 240 ml   Output --   Net 240 ml       Physical Exam:   Physical Exam General- Awake, alert and oriented x 3, looks comfortable  HEENT- Normocephalic, atraumatic, oral mucosa- moist  Neck- Supple, No carotid bruit, no JVD  CVS- Normal S1/ S2, Regular rate and rhythm, No murmur, No edema  Respiratory system- minimal coarse breath sounds audible b/l, with scattered wheezing  Abdomen- Soft, Non distended, no tenderness, Bowel sound- present 4 quads  Genitourinary- No suprapubic tenderness, No CVA tenderness  Skin- No new bruise or rash  Musculoskeletal- No gross deformity  Psych- No acute psychosis  CNS- CN II- XII grossly intact, No acute focal neurologic deficit noted      Additional Data:     Labs:  Results from last 7 days   Lab Units 05/05/23  0553 05/04/23  0817 05/03/23  1429   WBC Thousand/uL 4 67   < > 7 25   HEMOGLOBIN g/dL 8 8*   < > 10 0*   HEMATOCRIT % 29 5*   < > 33 4*   PLATELETS Thousands/uL 179   < > 178   NEUTROS PCT %  --   --  80*   LYMPHS PCT %  --   --  12*   MONOS PCT %  --   --  7   EOS PCT %  --   --  1    < > = values in this interval not displayed       Results from last 7 days   Lab Units 05/05/23  0553   SODIUM mmol/L 127*   POTASSIUM mmol/L 4 2   CHLORIDE mmol/L 95*   CO2 mmol/L 29   BUN mg/dL 18   CREATININE mg/dL 0 88   ANION GAP mmol/L 3*   CALCIUM mg/dL 8 9   GLUCOSE RANDOM mg/dL 141*         Results from last 7 days   Lab Units 05/05/23  1153 05/05/23  0752 05/04/23  2141 05/04/23  1619 05/04/23  1127 05/04/23  0733 05/04/23  0637 05/03/23 2210 05/03/23  1759   POC GLUCOSE mg/dl 134 127 282* 249* 136 120 120 315* 287*         Results from last 7 days   Lab Units 05/04/23  0559 05/03/23  2252 05/03/23  1429   LACTIC ACID mmol/L  --  1 8 2 1*   PROCALCITONIN ng/ml 0 07 0 05  --        Lines/Drains:  Invasive Devices     Central Venous Catheter Line  Duration           Port A Cath 11/25/22 Right Chest 161 days          Drain  Duration           Ureteral Drain/Stent Right ureter 6 Fr  1186 days                Central Line:  Goal for removal: N/A - Discharging with PICC for IV ABX/medications             Imaging: No pertinent imaging reviewed  Recent Cultures (last 7 days):   Results from last 7 days   Lab Units 05/03/23  1446 05/03/23  1444   BLOOD CULTURE  No Growth at 24 hrs  No Growth at 24 hrs         Last 24 Hours Medication List:   Current Facility-Administered Medications   Medication Dose Route Frequency Provider Last Rate   • acetaminophen  650 mg Oral Q6H PRN Denae Arauz PA-C     • acetaminophen  975 mg Oral Q8H [facility] Tom SESAY MD     • albuterol  2 puff Inhalation Q4H PRN Tom SESAY MD     • ALPRAZolam  1 mg Oral HS PRN Tom SESAY MD     • apixaban  5 mg Oral BID Tom SESAY MD     • atovaquone  1,500 mg Oral Daily Tom SESAY MD     • dextromethorphan-guaiFENesin  5 mL Oral Q4H PRN Tom SESAY MD     • dronabinol  5 mg Oral TID Mell SESAY MD     • famotidine  40 mg Oral Daily Tom SESAY MD     • hydrALAZINE  5 mg Intravenous Q6H PRN Tom SESAY MD     • HYDROmorphone  1 mg Intravenous Q4H PRN WAYNE García     • insulin glargine  10 Units Subcutaneous HS Tom SESAY MD     • insulin lispro  1-5 Units Subcutaneous TID AC Tom SESAY MD     • insulin lispro  3 Units Subcutaneous TID With Meals Tom SESAY MD     • ipratropium-albuterol  3 mL Nebulization TID Tom SESAY MD     • lidocaine  1 patch Topical Daily Denae Arauz PA-C     • ondansetron  4 mg Oral Q6H PRN Tom SESAY MD     • oxyCODONE  10 mg Oral Q4H PRN Refugio Skeans, CRNP      Or   • oxyCODONE  15 mg Oral Q4H PRN WAYNE Hsu     • pancrelipase (Lip-Prot-Amyl)  48,000 Units Oral TID With Meals Tom SESAY MD     • piperacillin-tazobactam  3 375 g Intravenous Q8H Tom SESAY MD 3 375 g (05/05/23 0820)   • predniSONE  10 mg Oral Daily Tom SESAY MD     • pregabalin  150 mg Oral BID Tom SESAY MD     • tacrolimus  0 5 mg Oral BID Tom SESAY MD     • valACYclovir  500 mg Oral HS Rell Smyth MD     • zolpidem  2 5 mg Oral HS PRN Rell Smyth MD          Today, Patient Was Seen By: Rolf Metz MD    **Please Note: This note may have been constructed using a voice recognition system  **

## 2023-05-05 NOTE — ASSESSMENT & PLAN NOTE
• Chronic, uncontrolled, as evidenced by a hemoglobin A1C of 8 5  • Home regimen includes:  o 12 units Glargine daily; with 5 units Humalog with sliding scale  • Continue insulin sliding scale with 10 units Lantus, 3 units Humalog with meals in addition to sliding scale    • Monitor blood glucose AC/HS  • Hypoglycemia protocol  • [facility] diet

## 2023-05-05 NOTE — CASE MANAGEMENT
Case Management Discharge Planning Note    Patient name Marylou Held  Location [address] 316/-[id number] MRN [id number]  : 1975 Date 2023       Current Admission Date: 5/3/2023  Current Admission Diagnosis:Acute bronchitis   Patient Active Problem List    Diagnosis Date Noted   • Hyponatremia 2023   • Abdominal wall lump 2023   • Transplant recipient    • Acute bronchitis 2022   • Pulmonary embolus ([address] ) 2022   • Right leg pain 10/13/2022   • Chronic cough 2022   • Renal calculus 2022   • Pulmonary nodules 2022   • Rib pain 2022   • Abnormal uterine bleeding (AUB) 2022   • Gastroparesis 2022   • Ulcerative pancolitis without complication ([address] )    • Neuropathic pain of right foot 01/10/2022   • Moderate protein-calorie malnutrition ([address] ) 2022   • Weakness of right lower extremity 2021   • Pulmonary hypertension ([address] ) 2021   • Cystic fibrosis ([facility] [address] ) 2021   • Essential hypertension 2021   • Hypercholesteremia 2021   • Long term current use of bisphosphonates 2020   • Long term current use of systemic steroids 2020   • Osteoporosis 2020   • Chronic pain disorder 2020   • Asthma 2020   • CKD (chronic kidney disease) stage 2, GFR 60-89 ml/min 2019   • Hiatal hernia 2019   • Acute on chronic anemia 2018   • DVT (deep venous thrombosis) ([facility] [address] ) 2018   • Severe protein-calorie malnutrition ([facility] [address] ) 2018   • Immunosuppression ([facility] [address] ) 2017   • Transplant rejection 2016   • Colon polyp 2016   • S/P pneumonectomy 2013   • Anxiety 2012   • Type 1 diabetes ([address] ) 10/28/2010   • Pancreatic insufficiency 10/28/2010   • Chronic sinusitis 10/28/2010   • Esophageal reflux 10/28/2010      LOS (days): 0  Geometric Mean LOS (GMLOS) (days): 2 90  Days to GMLOS:2 4     OBJECTIVE:  Risk of Unplanned Readmission Score: 21 79         Current admission status: Inpatient   Preferred Pharmacy:   [id number] [place] [address]  [address]  Phone: [phone] Fax: [address]  [address] [address]  Phone: [phone] Fax: [phone]    Primary Care Provider: Love Pollack MD    Primary Insurance: BLUE CROSS  Secondary Insurance: MEDICARE    DISCHARGE DETAILS:    Discharge planning discussed with[de-identified] pt, spouse;  daughter asleep in room  Freedom of Choice: Yes  Comments - Freedom of Choice: Met w pt and family, introduced self and explained role of CM, including arranging DME, STR, home health, out pt tx  Advised pt/family that CM will make appropriate referrals based on treatment team recommendations and MD orders, and they can consider recommended plan of care and choose from available vendors  CM contacted family/caregiver?: Yes  Were Treatment Team discharge recommendations reviewed with patient/caregiver?: No (none at this time)          Contacts  Patient Contacts: Mary Sanz (Spouse)   [phone] (Mobile)  Relationship to Patient[de-identified] Family  Contact Method: In Person  Reason/Outcome: Continuity of Care, Discharge [address]         Is the patient interested in [facility] at discharge?: Yes (wants to resume present services)  [address] requested[de-identified] [address] Name[de-identified] [address] Provider[de-identified] PCP  Home Health Services Needed[de-identified] Other (comment) (blood draws)  Homebound Criteria Met[de-identified] Immunosuppressed  Supporting Clincal Findings[de-identified] Limited Endurance, Fatigues Easliy in [employer], Dyspnea with Exertion    DME Referral Provided  Referral made for DME?: No    Other Referral/Resources/Interventions Provided:  Referral Comments: D/C anticipated in 24 hrs  Per MD, no post d/c needs identified   CM continues to be available  Pt current w [facility];  referral sent via [address] for [address]           Treatment Team Recommendation: Home  Discharge Destination Plan[de-identified] Home  Transport at Discharge : Family                             IMM Given (Date):: 05/05/23  IMM Given to[de-identified] Patient  Family notified[de-identified] spouse present

## 2023-05-05 NOTE — PROGRESS NOTES
"Progress Note - Pulmonary   Gamal Fortune 41 y o  female MRN: [id number]  Unit/Bed#: -02 Encounter: [id number]    Assessment:  1  Abnormal chest CT  2  Acute tracheobronchitis  3  Cystic fibrosis s/p lung transplant x 2  4  H/o DVT/PE    Plan:  Abnormal chest CT in the setting of patient with underlying cystic fibrosis status post lung transplant x2  Her procalcitonin has been normal x2, no leukocytosis and she is afebrile  Her CT scan shows a tiny focus of groundglass opacity in the left upper lobe, possible developing infectious process  Given her complicated history with recurrent pneumonia requiring ECMO in the past, will continue empiric antibiotics  Sputum culture has been ordered, patient did provide a sample this morning  This was also discussed with providers at [facility] transplant clinic  If any clinical worsening would need transfer to [facility]  If she remains stable over the next 24 hours or so, can likely discharge home on oral antibiotics  Continue atovaquone, prednisone, Prograf, valacyclovir, Creon  DuoNeb 3 times per day  Eliquis for history of PE/DVT  We will see patient on Monday if still hospitalized, please call over the weekend with questions    Subjective:   Patient resting in bed  Still having pain on the left side, she does usually have some pain at baseline  Her cough has improved some and is becoming more dry at this time  Objective:     Vitals: Blood pressure 140/80, pulse 77, temperature 99 4 °F (37 4 °C), resp  rate 18, height 5' 2\" (1 575 m), weight 52 2 kg (115 lb), SpO2 95 %, not currently breastfeeding  ,Body mass index is 21 03 kg/m²        Intake/Output Summary (Last 24 hours) at 5/5/2023 1107  Last data filed at 5/4/2023 1816  Gross per 24 hour   Intake 480 ml   Output --   Net 480 ml       Invasive Devices     Central Venous Catheter Line  Duration           Port A Cath 11/25/22 Right Chest 161 days          Drain  Duration           Ureteral Drain/Stent Right " "ureter 6 Fr  1185 days                Physical Exam: /80   Pulse 77   Temp 99 4 °F (37 4 °C)   Resp 18   Ht 5' 2\" (1 575 m)   Wt 52 2 kg (115 lb)   SpO2 95%   BMI 21 03 kg/m²   General appearance: alert and oriented, in no acute distress  Head: Normocephalic, without obvious abnormality, atraumatic  Eyes: negative findings: conjunctivae and sclerae normal  Lungs: improved rhonchi on left  Heart: regular rate and rhythm  Abdomen: normal findings: soft, non-tender  Extremities: no edema  Skin: warm and dry  Neurologic: Mental status: Alert, oriented, thought content appropriate     Labs: I have personally reviewed pertinent lab results  , CBC:   Lab Results   Component Value Date    WBC 4 67 05/05/2023    HGB 8 8 (L) 05/05/2023    HCT 29 5 (L) 05/05/2023    MCV 74 (L) 05/05/2023     05/05/2023    MCH 22 2 (L) 05/05/2023    MCHC 29 8 (L) 05/05/2023    RDW 19 6 (H) 05/05/2023    MPV 9 8 05/05/2023   , CMP:   Lab Results   Component Value Date    SODIUM 127 (L) 05/05/2023    K 4 2 05/05/2023    CL 95 (L) 05/05/2023    CO2 29 05/05/2023    BUN 18 05/05/2023    CREATININE 0 88 05/05/2023    CALCIUM 8 9 05/05/2023    EGFR 79 05/05/2023     Imaging and other studies: I have personally reviewed pertinent reports     and I have personally reviewed pertinent films in PACS      "

## 2023-05-06 ENCOUNTER — APPOINTMENT (INPATIENT)
Dept: RADIOLOGY | Facility: HOSPITAL | Age: 48
End: 2023-05-06

## 2023-05-06 LAB
ANION GAP SERPL CALCULATED.3IONS-SCNC: 16 MMOL/L (ref 4–13)
ANION GAP SERPL CALCULATED.3IONS-SCNC: 3 MMOL/L (ref 4–13)
ANION GAP SERPL CALCULATED.3IONS-SCNC: 4 MMOL/L (ref 4–13)
BASOPHILS # BLD AUTO: 0.03 THOUSANDS/ÂΜL (ref 0–0.1)
BASOPHILS NFR BLD AUTO: 0 % (ref 0–1)
BUN SERPL-MCNC: 15 MG/DL (ref 5–25)
BUN SERPL-MCNC: 17 MG/DL (ref 5–25)
BUN SERPL-MCNC: 18 MG/DL (ref 5–25)
CALCIUM SERPL-MCNC: 7.1 MG/DL (ref 8.4–10.2)
CALCIUM SERPL-MCNC: 9.2 MG/DL (ref 8.4–10.2)
CALCIUM SERPL-MCNC: 9.3 MG/DL (ref 8.4–10.2)
CHLORIDE SERPL-SCNC: 102 MMOL/L (ref 96–108)
CHLORIDE SERPL-SCNC: 94 MMOL/L (ref 96–108)
CHLORIDE SERPL-SCNC: 95 MMOL/L (ref 96–108)
CO2 SERPL-SCNC: 22 MMOL/L (ref 21–32)
CO2 SERPL-SCNC: 30 MMOL/L (ref 21–32)
CO2 SERPL-SCNC: 30 MMOL/L (ref 21–32)
CREAT SERPL-MCNC: 0.71 MG/DL (ref 0.6–1.3)
CREAT SERPL-MCNC: 0.87 MG/DL (ref 0.6–1.3)
CREAT SERPL-MCNC: 0.94 MG/DL (ref 0.6–1.3)
EOSINOPHIL # BLD AUTO: 0.22 THOUSAND/ÂΜL (ref 0–0.61)
EOSINOPHIL NFR BLD AUTO: 3 % (ref 0–6)
ERYTHROCYTE [DISTWIDTH] IN BLOOD BY AUTOMATED COUNT: 19.9 % (ref 11.6–15.1)
GFR SERPL CREATININE-BSD FRML MDRD: 103 ML/MIN/1.73SQ M
GFR SERPL CREATININE-BSD FRML MDRD: 73 ML/MIN/1.73SQ M
GFR SERPL CREATININE-BSD FRML MDRD: 80 ML/MIN/1.73SQ M
GLUCOSE SERPL-MCNC: 122 MG/DL (ref 65–140)
GLUCOSE SERPL-MCNC: 129 MG/DL (ref 65–140)
GLUCOSE SERPL-MCNC: 130 MG/DL (ref 65–140)
GLUCOSE SERPL-MCNC: 134 MG/DL (ref 65–140)
GLUCOSE SERPL-MCNC: 247 MG/DL (ref 65–140)
GLUCOSE SERPL-MCNC: 71 MG/DL (ref 65–140)
GLUCOSE SERPL-MCNC: 94 MG/DL (ref 65–140)
HCT VFR BLD AUTO: 32.8 % (ref 34.8–46.1)
HGB BLD-MCNC: 9.9 G/DL (ref 11.5–15.4)
IMM GRANULOCYTES # BLD AUTO: 0.03 THOUSAND/UL (ref 0–0.2)
IMM GRANULOCYTES NFR BLD AUTO: 0 % (ref 0–2)
LYMPHOCYTES # BLD AUTO: 1.6 THOUSANDS/ÂΜL (ref 0.6–4.47)
LYMPHOCYTES NFR BLD AUTO: 23 % (ref 14–44)
MCH RBC QN AUTO: 22.6 PG (ref 26.8–34.3)
MCHC RBC AUTO-ENTMCNC: 30.2 G/DL (ref 31.4–37.4)
MCV RBC AUTO: 75 FL (ref 82–98)
MONOCYTES # BLD AUTO: 0.74 THOUSAND/ÂΜL (ref 0.17–1.22)
MONOCYTES NFR BLD AUTO: 10 % (ref 4–12)
NEUTROPHILS # BLD AUTO: 4.48 THOUSANDS/ÂΜL (ref 1.85–7.62)
NEUTS SEG NFR BLD AUTO: 64 % (ref 43–75)
NRBC BLD AUTO-RTO: 0 /100 WBCS
PLATELET # BLD AUTO: 200 THOUSANDS/UL (ref 149–390)
PMV BLD AUTO: 9.5 FL (ref 8.9–12.7)
POTASSIUM SERPL-SCNC: 3.3 MMOL/L (ref 3.5–5.3)
POTASSIUM SERPL-SCNC: 4.1 MMOL/L (ref 3.5–5.3)
POTASSIUM SERPL-SCNC: 4.5 MMOL/L (ref 3.5–5.3)
RBC # BLD AUTO: 4.38 MILLION/UL (ref 3.81–5.12)
SODIUM SERPL-SCNC: 127 MMOL/L (ref 135–147)
SODIUM SERPL-SCNC: 129 MMOL/L (ref 135–147)
SODIUM SERPL-SCNC: 140 MMOL/L (ref 135–147)
WBC # BLD AUTO: 7.1 THOUSAND/UL (ref 4.31–10.16)

## 2023-05-06 RX ORDER — METHOCARBAMOL 500 MG/1
500 TABLET, FILM COATED ORAL ONCE
Status: COMPLETED | OUTPATIENT
Start: 2023-05-06 | End: 2023-05-06

## 2023-05-06 RX ORDER — POLYETHYLENE GLYCOL 3350 17 G/17G
17 POWDER, FOR SOLUTION ORAL 2 TIMES DAILY
Status: DISCONTINUED | OUTPATIENT
Start: 2023-05-06 | End: 2023-05-07 | Stop reason: HOSPADM

## 2023-05-06 RX ORDER — BISACODYL 5 MG/1
5 TABLET, DELAYED RELEASE ORAL
Status: DISCONTINUED | OUTPATIENT
Start: 2023-05-06 | End: 2023-05-07 | Stop reason: HOSPADM

## 2023-05-06 RX ORDER — SODIUM CHLORIDE 1 G/1
1 TABLET ORAL
Status: DISCONTINUED | OUTPATIENT
Start: 2023-05-06 | End: 2023-05-07 | Stop reason: HOSPADM

## 2023-05-06 RX ORDER — SIMETHICONE 80 MG
80 TABLET,CHEWABLE ORAL EVERY 6 HOURS PRN
Status: DISCONTINUED | OUTPATIENT
Start: 2023-05-06 | End: 2023-05-07 | Stop reason: HOSPADM

## 2023-05-06 RX ADMIN — PIPERACILLIN AND TAZOBACTAM 3.38 G: 36; 4.5 INJECTION, POWDER, FOR SOLUTION INTRAVENOUS at 23:22

## 2023-05-06 RX ADMIN — ACETAMINOPHEN 975 MG: 325 TABLET ORAL at 22:41

## 2023-05-06 RX ADMIN — OXYCODONE HYDROCHLORIDE 15 MG: 5 TABLET ORAL at 15:59

## 2023-05-06 RX ADMIN — PREGABALIN 150 MG: 75 CAPSULE ORAL at 08:21

## 2023-05-06 RX ADMIN — OXYCODONE HYDROCHLORIDE 15 MG: 5 TABLET ORAL at 08:27

## 2023-05-06 RX ADMIN — INSULIN GLARGINE 10 UNITS: 100 INJECTION, SOLUTION SUBCUTANEOUS at 22:43

## 2023-05-06 RX ADMIN — PIPERACILLIN AND TAZOBACTAM 3.38 G: 36; 4.5 INJECTION, POWDER, FOR SOLUTION INTRAVENOUS at 08:25

## 2023-05-06 RX ADMIN — SODIUM CHLORIDE TAB 1 GM 1 G: 1 TAB at 11:54

## 2023-05-06 RX ADMIN — ALPRAZOLAM 1 MG: 0.5 TABLET ORAL at 23:20

## 2023-05-06 RX ADMIN — LIDOCAINE 1 PATCH: 50 PATCH CUTANEOUS at 09:43

## 2023-05-06 RX ADMIN — IPRATROPIUM BROMIDE AND ALBUTEROL SULFATE 3 ML: 2.5; .5 SOLUTION RESPIRATORY (INHALATION) at 13:52

## 2023-05-06 RX ADMIN — HYDROMORPHONE HYDROCHLORIDE 1 MG: 1 INJECTION, SOLUTION INTRAMUSCULAR; INTRAVENOUS; SUBCUTANEOUS at 05:25

## 2023-05-06 RX ADMIN — PANCRELIPASE 48000 UNITS: 24000; 76000; 120000 CAPSULE, DELAYED RELEASE PELLETS ORAL at 07:45

## 2023-05-06 RX ADMIN — PREGABALIN 150 MG: 75 CAPSULE ORAL at 17:07

## 2023-05-06 RX ADMIN — HYDROMORPHONE HYDROCHLORIDE 1 MG: 1 INJECTION, SOLUTION INTRAMUSCULAR; INTRAVENOUS; SUBCUTANEOUS at 09:42

## 2023-05-06 RX ADMIN — POLYETHYLENE GLYCOL 3350 17 G: 17 POWDER, FOR SOLUTION ORAL at 17:30

## 2023-05-06 RX ADMIN — PANCRELIPASE 48000 UNITS: 24000; 76000; 120000 CAPSULE, DELAYED RELEASE PELLETS ORAL at 16:04

## 2023-05-06 RX ADMIN — OXYCODONE HYDROCHLORIDE 15 MG: 5 TABLET ORAL at 20:01

## 2023-05-06 RX ADMIN — APIXABAN 5 MG: 5 TABLET, FILM COATED ORAL at 08:21

## 2023-05-06 RX ADMIN — PANCRELIPASE 48000 UNITS: 24000; 76000; 120000 CAPSULE, DELAYED RELEASE PELLETS ORAL at 11:48

## 2023-05-06 RX ADMIN — ATOVAQUONE 1500 MG: 750 SUSPENSION ORAL at 08:22

## 2023-05-06 RX ADMIN — APIXABAN 5 MG: 5 TABLET, FILM COATED ORAL at 17:07

## 2023-05-06 RX ADMIN — IPRATROPIUM BROMIDE AND ALBUTEROL SULFATE 3 ML: 2.5; .5 SOLUTION RESPIRATORY (INHALATION) at 07:18

## 2023-05-06 RX ADMIN — HYDROMORPHONE HYDROCHLORIDE 1 MG: 1 INJECTION, SOLUTION INTRAMUSCULAR; INTRAVENOUS; SUBCUTANEOUS at 14:07

## 2023-05-06 RX ADMIN — DRONABINOL 5 MG: 2.5 CAPSULE ORAL at 08:21

## 2023-05-06 RX ADMIN — TACROLIMUS 0.5 MG: 0.5 CAPSULE ORAL at 17:07

## 2023-05-06 RX ADMIN — INSULIN LISPRO 4 UNITS: 100 INJECTION, SOLUTION INTRAVENOUS; SUBCUTANEOUS at 07:47

## 2023-05-06 RX ADMIN — PREDNISONE 10 MG: 10 TABLET ORAL at 08:21

## 2023-05-06 RX ADMIN — HYDROMORPHONE HYDROCHLORIDE 1 MG: 1 INJECTION, SOLUTION INTRAMUSCULAR; INTRAVENOUS; SUBCUTANEOUS at 18:09

## 2023-05-06 RX ADMIN — OXYCODONE HYDROCHLORIDE 15 MG: 5 TABLET ORAL at 04:26

## 2023-05-06 RX ADMIN — FAMOTIDINE 40 MG: 20 TABLET, FILM COATED ORAL at 08:21

## 2023-05-06 RX ADMIN — METHOCARBAMOL 500 MG: 500 TABLET ORAL at 06:13

## 2023-05-06 RX ADMIN — TACROLIMUS 0.5 MG: 0.5 CAPSULE ORAL at 08:21

## 2023-05-06 RX ADMIN — INSULIN LISPRO 1 UNITS: 100 INJECTION, SOLUTION INTRAVENOUS; SUBCUTANEOUS at 11:49

## 2023-05-06 RX ADMIN — BISACODYL 5 MG: 5 TABLET, COATED ORAL at 22:41

## 2023-05-06 RX ADMIN — GUAIFENESIN AND DEXTROMETHORPHAN 5 ML: 100; 10 SYRUP ORAL at 07:45

## 2023-05-06 RX ADMIN — SODIUM CHLORIDE TAB 1 GM 1 G: 1 TAB at 08:25

## 2023-05-06 RX ADMIN — INSULIN LISPRO 6 UNITS: 100 INJECTION, SOLUTION INTRAVENOUS; SUBCUTANEOUS at 11:49

## 2023-05-06 RX ADMIN — ACETAMINOPHEN 975 MG: 325 TABLET ORAL at 14:07

## 2023-05-06 RX ADMIN — HYDROMORPHONE HYDROCHLORIDE 1 MG: 1 INJECTION, SOLUTION INTRAMUSCULAR; INTRAVENOUS; SUBCUTANEOUS at 22:13

## 2023-05-06 RX ADMIN — IPRATROPIUM BROMIDE AND ALBUTEROL SULFATE 3 ML: 2.5; .5 SOLUTION RESPIRATORY (INHALATION) at 19:28

## 2023-05-06 RX ADMIN — HYDROMORPHONE HYDROCHLORIDE 1 MG: 1 INJECTION, SOLUTION INTRAMUSCULAR; INTRAVENOUS; SUBCUTANEOUS at 01:32

## 2023-05-06 RX ADMIN — ACETAMINOPHEN 975 MG: 325 TABLET ORAL at 06:13

## 2023-05-06 RX ADMIN — POLYETHYLENE GLYCOL 3350 17 G: 17 POWDER, FOR SOLUTION ORAL at 08:21

## 2023-05-06 RX ADMIN — PIPERACILLIN AND TAZOBACTAM 3.38 G: 36; 4.5 INJECTION, POWDER, FOR SOLUTION INTRAVENOUS at 16:19

## 2023-05-06 RX ADMIN — VALACYCLOVIR HYDROCHLORIDE 500 MG: 500 TABLET, FILM COATED ORAL at 22:41

## 2023-05-06 RX ADMIN — SIMETHICONE 80 MG: 80 TABLET, CHEWABLE ORAL at 22:40

## 2023-05-06 RX ADMIN — SODIUM CHLORIDE TAB 1 GM 1 G: 1 TAB at 15:59

## 2023-05-06 NOTE — ASSESSMENT & PLAN NOTE
"· /93   Pulse 70   Temp (!) 97 4 °F (36 3 °C)   Resp 17   Ht 5' 2\" (1 575 m)   Wt 52 2 kg (115 lb)   SpO2 100%   BMI 21 03 kg/m²   · Stable pressures  · Cont home meds  "

## 2023-05-06 NOTE — ASSESSMENT & PLAN NOTE
· 129 this AM  · Urine study shows SIADH like picture  · Will start on salt tabs 1g TID  · Recheck BMP q8h

## 2023-05-06 NOTE — PLAN OF CARE
Problem: INFECTION - ADULT  Goal: Absence or prevention of progression during hospitalization  Description: INTERVENTIONS:  - Assess and monitor for signs and symptoms of infection  - Monitor lab/diagnostic results  - Monitor all insertion sites, i e  indwelling lines, tubes, and drains  - Monitor endotracheal if appropriate and nasal secretions for changes in amount and color  - [place] appropriate cooling/warming therapies per order  - Administer medications as ordered  - Instruct and encourage patient and family to use good hand hygiene technique  - Identify and instruct in appropriate isolation precautions for identified infection/condition  Outcome: Progressing  Goal: Absence of fever/infection during neutropenic period  Description: INTERVENTIONS:  - Monitor WBC    Outcome: Progressing     Problem: DISCHARGE PLANNING  Goal: Discharge to home or other facility with appropriate resources  Description: INTERVENTIONS:  - Identify barriers to discharge w/patient and caregiver  - Arrange for needed discharge resources and transportation as appropriate  - Identify discharge learning needs (meds, wound care, etc )  - Arrange for interpretive services to assist at discharge as needed  - Refer to Case Management Department for coordinating discharge planning if the patient needs post-hospital services based on physician/advanced practitioner order or complex needs related to functional status, cognitive ability, or social support system  Outcome: Progressing

## 2023-05-06 NOTE — PROGRESS NOTES
"[address] [facility]  Progress Note  Name: Robel Jessica  MRN: [id number]  Unit/Bed#: -85 I Date of Admission: 5/3/2023   Date of Service: 5/6/2023 I Hospital Day: 1    Assessment/Plan   * Acute bronchitis  Assessment & Plan  · Plan to continue antibiotics today  · Last night pt had left sided pleuritic CP, XR does not show PNA  Labs are stable  O2 sats are stable  Concern for MSK pain- cont pain meds, tylenol ATC, flexeril PRN for muscle spasm  · Continue nebulizers   · IS    Hyponatremia  Assessment & Plan  · 129 this AM  · Urine study shows SIADH like picture  · Will start on salt tabs 1g TID  · Recheck BMP q8h    Pulmonary embolus (HCC)  Assessment & Plan  · History of PE, DVT in the past   · Continue Eliquis 5 mg twice daily  Essential hypertension  Assessment & Plan  · /93   Pulse 70   Temp (!) 97 4 °F (36 3 °C)   Resp 17   Ht 5' 2\" (1 575 m)   Wt 52 2 kg (115 lb)   SpO2 100%   BMI 21 03 kg/m²   · Stable pressures  · Cont home meds    Type 1 diabetes (HCC)  Assessment & Plan  • Chronic, uncontrolled, as evidenced by a hemoglobin A1C of 8 5  • Switched to home regimen, lantus 10U - premeal 4-6-9 + SSI  • Better controlled with the changes today    S/P pneumonectomy  Assessment & Plan  · Past medical history of cystic fibrosis s/p pneumonectomy x2  · S/p lung transplant  · Currently patient has left-sided functioning lung  · Resume home dose of atovaquone, Prograf, Valtrex, prednisone  · Patient is also on azithromycin 3 times a week which is on hold due to being on Zosyn for now  · Follows at [facility]  VTE Pharmacologic Prophylaxis: VTE Score: 4 Moderate Risk (Score 3-4) - Pharmacological DVT Prophylaxis Ordered: apixaban (Eliquis)  Patient Centered Rounds: I performed bedside rounds with nursing staff today    Discussions with Specialists or Other Care Team Provider: n/a    Education and Discussions with Family / Patient: Patient declined call to "   Total Time Spent on Date of Encounter in care of patient: 35 minutes This time was spent on one or more of the following: performing physical exam; counseling and coordination of care; obtaining or reviewing history; documenting in the medical record; reviewing/ordering tests, medications or procedures; communicating with other healthcare professionals and discussing with patient's family/caregivers  Current Length of Stay: 1 day(s)  Current Patient Status: Inpatient   Certification Statement: The patient will continue to require additional inpatient hospital stay due to hyponatremia  Discharge Plan: Anticipate discharge tomorrow to home  Code Status: Level 1 - Full Code    Subjective:   Seen and examined at bedside  Chest pain is better than last night  Still present and is pleuritic /10  Associated cough and expectoration  No fever or abd pain or NVD    Objective:     Vitals:   Temp (24hrs), Av 8 °F (36 6 °C), Min:97 4 °F (36 3 °C), Max:98 4 °F (36 9 °C)    Temp:  [97 4 °F (36 3 °C)-98 4 °F (36 9 °C)] 97 4 °F (36 3 °C)  HR:  [67-80] 70  Resp:  [16-18] 17  BP: (146-159)/(90-98) 149/93  SpO2:  [95 %-100 %] 100 %  Body mass index is 21 03 kg/m²  Input and Output Summary (last 24 hours):      Intake/Output Summary (Last 24 hours) at 2023 0901  Last data filed at 2023 0701  Gross per 24 hour   Intake 6040 ml   Output 4400 ml   Net 1640 ml       Physical Exam:   Physical Exam General- Awake, alert and oriented x 3, looks comfortable  HEENT- Normocephalic, atraumatic, oral mucosa- moist  Neck- Supple, No carotid bruit, no JVD  CVS- Normal S1/ S2, Regular rate and rhythm, No murmur, No edema  Respiratory system- coarse BS, hollow sounds on the right, left wheezing and rattling secretions  Abdomen- Soft, Non distended, no tenderness, Bowel sound- present 4 quads  Genitourinary- No suprapubic tenderness, No CVA tenderness  Skin- No new bruise or rash  Musculoskeletal- No gross deformity  Psych- No acute psychosis  CNS- CN II- XII grossly intact, No acute focal neurologic deficit noted      Additional Data:     Labs:  Results from last 7 days   Lab Units 05/06/23  0603   WBC Thousand/uL 7 10   HEMOGLOBIN g/dL 9 9*   HEMATOCRIT % 32 8*   PLATELETS Thousands/uL 200   NEUTROS PCT % 64   LYMPHS PCT % 23   MONOS PCT % 10   EOS PCT % 3     Results from last 7 days   Lab Units 05/06/23  0603   SODIUM mmol/L 129*   POTASSIUM mmol/L 4 1   CHLORIDE mmol/L 95*   CO2 mmol/L 30   BUN mg/dL 17   CREATININE mg/dL 0 87   ANION GAP mmol/L 4   CALCIUM mg/dL 9 3   GLUCOSE RANDOM mg/dL 129         Results from last 7 days   Lab Units 05/06/23  0733 05/05/23  2103 05/05/23  1551 05/05/23  1153 05/05/23  0752 05/04/23  2141 05/04/23  1619 05/04/23  1127 05/04/23  0733 05/04/23  0637 05/03/23  2210 05/03/23  1759   POC GLUCOSE mg/dl 122 280* 193* 134 127 282* 249* 136 120 120 315* 287*         Results from last 7 days   Lab Units 05/04/23  0559 05/03/23  2254 05/03/23  1429   LACTIC ACID mmol/L  --  1 8 2 1*   PROCALCITONIN ng/ml 0 07 0 05  --        Lines/Drains:  Invasive Devices     Central Venous Catheter Line  Duration           Port A Cath 11/25/22 Right Chest 161 days          Drain  Duration           Ureteral Drain/Stent Right ureter 6 Fr  1186 days                Central Line:  Goal for removal: N/A - Discharging with PICC for IV ABX/medications             Imaging: Personally reviewed the following imaging: chest xray    Recent Cultures (last 7 days):   Results from last 7 days   Lab Units 05/05/23  1217 05/03/23  1446 05/03/23  1444   BLOOD CULTURE   --  No Growth at 48 hrs  No Growth at 48 hrs     GRAM STAIN RESULT  3+ Polys*  1+ Gram positive cocci in pairs*  1+ Gram negative rods*  2+ Epithelial cells per low power field*  --   --        Last 24 Hours Medication List:   Current Facility-Administered Medications   Medication Dose Route Frequency Provider Last Rate   • acetaminophen  650 mg Oral Q6H PRN Denae Arauz PA-C     • acetaminophen  975 mg Oral Q8H Via Randall SESAY MD     • albuterol  2 puff Inhalation Q4H PRN Tom SESAY MD     • ALPRAZolam  1 mg Oral HS PRN Margarito SESAY MD     • apixaban  5 mg Oral BID Tom SESAY MD     • atovaquone  1,500 mg Oral Daily Tom SESAY MD     • dextromethorphan-guaiFENesin  5 mL Oral Q4H PRN Tom SESAY MD     • dronabinol  5 mg Oral TID Margarito SESAY MD     • famotidine  40 mg Oral Daily Tom SESAY MD     • hydrALAZINE  5 mg Intravenous Q6H PRN Tom SESAY MD     • HYDROmorphone  1 mg Intravenous Q4H PRN WAYNE Branham     • insulin glargine  10 Units Subcutaneous HS Tom SESAY MD     • insulin lispro  1-5 Units Subcutaneous TID AC Tom SESAY MD     • insulin lispro  4 Units Subcutaneous Daily With Breakfast Lissette Rankin MD     • insulin lispro  6 Units Subcutaneous Daily With Lunch Lissette Rankin MD     • insulin lispro  9 Units Subcutaneous Daily With Ivonne Holter, MD     • ipratropium-albuterol  3 mL Nebulization TID Margarito SESAY MD     • lidocaine  1 patch Topical Daily Denae Arauz PA-C     • ondansetron  4 mg Oral Q6H PRN Tom SESAY MD     • oxyCODONE  10 mg Oral Q4H PRN WAYNE Branham      Or   • oxyCODONE  15 mg Oral Q4H PRN WAYNE Branham     • pancrelipase (Lip-Prot-Amyl)  48,000 Units Oral TID With Meals Tom SESAY MD     • piperacillin-tazobactam  3 375 g Intravenous Q8H Tom SESAY MD 3 375 g (05/06/23 0825)   • polyethylene glycol  17 g Oral Daily Lissette Rankin MD     • predniSONE  10 mg Oral Daily Tom SESAY MD     • pregabalin  150 mg Oral BID Tom SESAY MD     • sodium chloride  1 g Oral TID With Meals Lissette Rankin MD     • tacrolimus  0 5 mg Oral BID Tom SESAY MD     • valACYclovir  500 mg Oral HS Tom SESAY MD     • zolpidem  2 5 mg Oral HS PRN Valeria Barry MD          Today, Patient Was Seen By: Frederick Arita MD    **Please Note: This note may have been constructed using a voice recognition system  **

## 2023-05-06 NOTE — QUICK NOTE
"Patient complaining of chest pain  bp 146/97 Hr 75 95% on RA, RR 24  Afebrile  On exam, patient is resting in bed holding left ribs  This is where she describes having the pain  Worse with inspiration  Patient states she has had similar pain before with \"lung infections in the past\"  Received IV dilaudid with improvement in pain from 8/10 to 7/10  Has had productive cough  No shortness of breath, palpitations  Recently had TIF procedure about 1m ago  Lungs with faint wheezing b/l  Heart rate and rhythm are normal  +tenderness over left ribs  CXR shows right pneumonectomy which appears unchanged from XR 3 days ago, awaiting radiologist interpretation  EKG NSR without ischemic changes  Robaxin ordered for suspected musculoskeletal pain     "

## 2023-05-06 NOTE — ASSESSMENT & PLAN NOTE
· Plan to continue antibiotics today  · Last night pt had left sided pleuritic CP, XR does not show PNA  Labs are stable  O2 sats are stable   Concern for MSK pain- cont pain meds, tylenol ATC, flexeril PRN for muscle spasm  · Continue nebulizers   · IS

## 2023-05-06 NOTE — UTILIZATION REVIEW
Continued Stay Review    Date:    5/6/23                          Current Patient Class:    Inpatient  Current Level of Care:    Med surg    HPI:47 y o  female initially admitted on OBS order 5/3 1749 converted to IP on 5/5 0744 for continued pulm workup      Assessment/Plan:   5/6    Feels  Improved since admission  Still with some chest discomfort  Continue IV  Zosyn  Monitor labs  Sodium  Did improve, now dropped to  127 again  BP improved        Vital Signs:   98 6-77-16       140/80     sats  95  %    Pertinent Labs/Diagnostic Results:   Results from last 7 days   Lab Units 05/03/23  1429   SARS-COV-2  Negative     Results from last 7 days   Lab Units 05/06/23  0603 05/05/23  0553 05/04/23  0817 05/03/23  1429   WBC Thousand/uL 7 10 4 67 4 81 7 25   HEMOGLOBIN g/dL 9 9* 8 8* 9 0* 10 0*   HEMATOCRIT % 32 8* 29 5* 30 3* 33 4*   PLATELETS Thousands/uL 200 179 176 178   NEUTROS ABS Thousands/µL 4 48  --   --  5 79         Results from last 7 days   Lab Units 05/06/23  1412 05/06/23  0603 05/05/23  0553 05/04/23  0817 05/04/23  0559 05/03/23  1429   SODIUM mmol/L 140 129* 127* 132*  --  127*   POTASSIUM mmol/L 3 3* 4 1 4 2 4 4  --  4 2   CHLORIDE mmol/L 102 95* 95* 101  --  95*   CO2 mmol/L 22 30 29 27  --  24   ANION GAP mmol/L 16* 4 3* 4  --  8   BUN mg/dL 15 17 18 21  --  22   CREATININE mg/dL 0 71 0 87 0 88 0 90  --  0 99   EGFR ml/min/1 73sq m 103 80 79 77  --  69   CALCIUM mg/dL 7 1* 9 3 8 9 8 7  --  9 0   MAGNESIUM mg/dL  --   --  1 8*  --  2 1  --          Results from last 7 days   Lab Units 05/06/23  1143 05/06/23  0733 05/05/23  2103 05/05/23  1551 05/05/23  1153 05/05/23  0752 05/04/23  2141 05/04/23  1619 05/04/23  1127 05/04/23  0733 05/04/23  0637 05/03/23  2210   POC GLUCOSE mg/dl 130 122 280* 193* 134 127 282* 249* 136 120 120 315*     Results from last 7 days   Lab Units 05/06/23  1412 05/06/23  0603 05/05/23  0553 05/04/23  0817 05/03/23  1429   GLUCOSE RANDOM mg/dL 71 129 141* 119 278* Results from last 7 days   Lab Units 05/04/23  0820 05/04/23  0559 05/03/23  2254 05/03/23  1429   HS TNI 0HR ng/L  --   --  5 4   HS TNI 2HR ng/L  --  5  --   --    HSTNI D2 ng/L  --  0  --   --    HS TNI 4HR ng/L 6  --   --   --    HSTNI D4 ng/L 1  --   --   --                  Results from last 7 days   Lab Units 05/04/23  0559 05/03/23  2254   PROCALCITONIN ng/ml 0 07 0 05     Results from last 7 days   Lab Units 05/03/23  2254 05/03/23  1429   LACTIC ACID mmol/L 1 8 2 1*                   Results from last 7 days   Lab Units 05/05/23  1215   OSMO UR mmol/*     Results from last 7 days   Lab Units 05/05/23  1215   SODIUM UR  28     Results from last 7 days   Lab Units 05/03/23  1429   INFLUENZA A PCR  Negative   INFLUENZA B PCR  Negative   RSV PCR  Negative                             Results from last 7 days   Lab Units 05/05/23  1217 05/03/23  1446 05/03/23  1444   BLOOD CULTURE   --  No Growth at 48 hrs  No Growth at 48 hrs     GRAM STAIN RESULT  3+ Polys*  1+ Gram positive cocci in pairs*  1+ Gram negative rods*  2+ Epithelial cells per low power field*  --   --                  Medications:   Scheduled Medications:  acetaminophen, 975 mg, Oral, Q8H DOTTY  apixaban, 5 mg, Oral, BID  atovaquone, 1,500 mg, Oral, Daily  dronabinol, 5 mg, Oral, TID  famotidine, 40 mg, Oral, Daily  insulin glargine, 10 Units, Subcutaneous, HS  insulin lispro, 1-5 Units, Subcutaneous, TID AC  insulin lispro, 4 Units, Subcutaneous, Daily With Breakfast  insulin lispro, 6 Units, Subcutaneous, Daily With Lunch  insulin lispro, 9 Units, Subcutaneous, Daily With Dinner  ipratropium-albuterol, 3 mL, Nebulization, TID  lidocaine, 1 patch, Topical, Daily  pancrelipase (Lip-Prot-Amyl), 48,000 Units, Oral, TID With Meals  piperacillin-tazobactam, 3 375 g, Intravenous, Q8H  polyethylene glycol, 17 g, Oral, Daily  predniSONE, 10 mg, Oral, Daily  pregabalin, 150 mg, Oral, BID  sodium chloride, 1 g, Oral, TID With Meals  tacrolimus, 0 5 mg, Oral, BID  valACYclovir, 500 mg, Oral, HS      Continuous IV Infusions:     PRN Meds:  acetaminophen, 650 mg, Oral, Q6H PRN  albuterol, 2 puff, Inhalation, Q4H PRN  ALPRAZolam, 1 mg, Oral, HS PRN  dextromethorphan-guaiFENesin, 5 mL, Oral, Q4H PRN  hydrALAZINE, 5 mg, Intravenous, Q6H PRN  HYDROmorphone, 1 mg, Intravenous, Q4H PRN  ondansetron, 4 mg, Oral, Q6H PRN  oxyCODONE, 10 mg, Oral, Q4H PRN   Or  oxyCODONE, 15 mg, Oral, Q4H PRN  zolpidem, 2 5 mg, Oral, HS PRN        Discharge Plan:    [facility]    Network Utilization Review Department  ATTENTION: Please call with any questions or concerns to [phone] and carefully listen to the prompts so that you are directed to the right person  All voicemails are confidential   Lidia Palomares all requests for admission clinical reviews, approved or denied determinations and any other requests to dedicated fax number below belonging to the campus where the patient is receiving treatment   List of dedicated fax numbers for the Facilities:  [address] DENIALS (Administrative/Medical Necessity) [phone]   [address] (Maternity/NICU/Pediatrics) [phone]   [address] [phone]   [facility] [address] [phone]   [address] [address] [address] Kim[address] 28 [phone]   [address]uglas Chowdhury [facility] 134 815 [facility] [phone]

## 2023-05-06 NOTE — RESPIRATORY THERAPY NOTE
RT Protocol Note  Marcia Fortune 41 y o  female MRN: [id number]  Unit/Bed#: -01 Encounter: [id number]    Assessment    Principal Problem:    Acute bronchitis  Active Problems:    S/P pneumonectomy    Type 1 diabetes ([facility] [address] )    Pancreatic insufficiency    Essential hypertension    Pulmonary embolus (HCC)    Hyponatremia      Home Pulmonary Medications:         Past Medical History:   Diagnosis Date   • ABPA (allergic bronchopulmonary aspergillosis) ([facility] ) 07/08/2012   • Acute kidney injury ([facility] ) 09/08/2017   • Acute on chronic respiratory failure ([facility] ) 03/04/2017    Last Assessment & Plan:  Formatting of this note might be different from the original  Recurrent with clinical worsening with enlarging PTX, s/p multiple chest tubes, but new fevers 6/19 and worsening leukocytosis indicating infection as cause of 6/19 decompensation with profound hypoxia in setting of shunting through right lung and continued pneumothorax s/p 2nd chest tube placement  6/27: Right   • Anaphylaxis 06/06/2022   • Asthma    • Chest pain 09/21/2021   • Chronic pain    • Chronic respiratory failure with hypoxia ([facility] ) 09/07/2021   • COVID-19 06/03/2022   • Cystic fibrosis ([facility] )    • Diabetes mellitus ([facility] )    • GERD (gastroesophageal reflux disease)    • HBP (high blood pressure)    • HCAP (healthcare-associated pneumonia) 03/06/2020   • Influenza B 02/23/2020   • Kidney stone    • Neutropenic fever ([facility] ) 09/26/2020   • Pneumonia due to Pseudomonas species ([place] [id number] ) 10/28/2010    Last Assessment & Plan:  Formatting of this note might be different from the original  Polymicrobial pna w/ pseudomonas, M   Abscessus, Cadida glabrata/albicans   • PONV (postoperative nausea and vomiting)    • Pseudomonas aeruginosa infection 09/20/2012   • Squamous cell skin cancer 01/12/2023    SCCIS- Left upper thigh   • Transplant recipient     Lung- 2013, 2017   • Wellness examination 02/04/2013    Formatting of this note might be different from the original  Discussed with patient and acknowledgement form signed on 2013 (ALYSHA)     Social History     Socioeconomic History   • Marital status: /Civil Union     Spouse name: None   • Number of children: None   • Years of education: 12   • Highest education level: None   Occupational History   • Occupation: Disabled    Tobacco Use   • Smoking status: Former     Packs/day: 1 00     Years: 10 00     Pack years: 10 00     Types: Cigarettes   • Smokeless tobacco: Never   Vaping Use   • Vaping Use: Never used   Substance and Sexual Activity   • Alcohol use: Yes     Alcohol/week: 2 0 standard drinks     Types: 2 Glasses of wine per week     Comment: social   • Drug use: Yes     Frequency: 7 0 times per week     Types: Marijuana, Oxycodone     Comment: medical edible marijuana prescribed   • Sexual activity: Yes     Partners: Male     Birth control/protection: Injection, Other, None     Comment: Depo Provera   Other Topics Concern   • None   Social History Narrative    Most recent tobacco use screenin2019    Do you currently or have you served in the [employer]: No    Were you activated, into active duty, as a member of the [employer] or as a Reservist: No    Occupation: disabled    Education: 15    Marital status:     Exercise level: Occasional    Diet: Specific    low sugar, high fat    General stress level: High    Alcohol intake: Occasional    Caffeine intake: Heavy    Chewing tobacco: none    Illicit drugs: none    Guns present in home: No    Seat belts used routinely: Yes    Smoke alarm in home: Yes    Advance directive: Yes     Social Determinants of Health     Financial Resource Strain: Not on file   Food Insecurity: No Food Insecurity   • Worried About Running Out of Food in the Last Year: Never true   • Ran Out of Food in the Last Year: Never true   Transportation Needs: No Transportation Needs   • Lack of Transportation (Medical): No   • Lack of Transportation (Non-Medical):  No "  Physical Activity: Not on file   Stress: Not on file   Social Connections: Not on file   Intimate Partner Violence: Not on file   Housing Stability: Low Risk    • Unable to Pay for Housing in the Last Year: No   • Number of Places Lived in the Last Year: 1   • Unstable Housing in the Last Year: No       Subjective    Subjective Data: continue as ordered per home therapy    Objective    Physical Exam:        Vitals:  Blood pressure 149/93, pulse 70, temperature (!) 97 4 °F (36 3 °C), resp  rate 17, height 5' 2\" (1 575 m), weight 52 2 kg (115 lb), SpO2 94 %, not currently breastfeeding  Imaging and other studies: I have personally reviewed pertinent reports        O2 Device: none, room air     Plan    Respiratory Plan: Home Bronchodilator Patient pathway        Resp Comments: will continue with current tx plan     "

## 2023-05-06 NOTE — RESPIRATORY THERAPY NOTE
RT Protocol Note  Mallory Cooks Zelonisgecek 41 y o  female MRN: [id number]  Unit/Bed#: -01 Encounter: [id number]    Assessment    Principal Problem:    Acute bronchitis  Active Problems:    S/P pneumonectomy    Type 1 diabetes [facility])    Pancreatic insufficiency    Essential hypertension    Pulmonary embolus (HCC)    Hyponatremia      Home Pulmonary Medications:     05/06/23 1928   Respiratory Protocol   Protocol Initiated? No   Protocol Selection Respiratory   Language Barrier? No   Medical & Social History Reviewed? Yes   Diagnostic Studies Reviewed? Yes   Physical Assessment Performed? Yes   Respiratory Plan Home Bronchodilator Patient pathway   Respiratory Assessment   Assessment Type During-treatment   General Appearance Alert; Awake   Respiratory Pattern Dyspnea with exertion   Chest Assessment Chest expansion symmetrical   Bilateral Breath Sounds Diminished;Coarse   R Breath Sounds Diminished;Coarse   L Breath Sounds Diminished;Coarse   Cough None   Resp Comments Respm protocol completed  Will continue with current tx plan   O2 Device RA   Cough Description   Sputum Amount None   Additional Assessments   SpO2 94 %            Past Medical History:   Diagnosis Date    ABPA (allergic bronchopulmonary aspergillosis) ([facility] ) 07/08/2012    Acute kidney injury ([facility] ) 09/08/2017    Acute on chronic respiratory failure ([facility] ) 03/04/2017    Last Assessment & Plan:  Formatting of this note might be different from the original  Recurrent with clinical worsening with enlarging PTX, s/p multiple chest tubes, but new fevers 6/19 and worsening leukocytosis indicating infection as cause of 6/19 decompensation with profound hypoxia in setting of shunting through right lung and continued pneumothorax s/p 2nd chest tube placement    6/27: Right    Anaphylaxis 06/06/2022    Asthma     Chest pain 09/21/2021    Chronic pain     Chronic respiratory failure with hypoxia ([facility] ) 09/07/2021    COVID-19 06/03/2022    Cystic fibrosis ([place] [address] ) Diabetes mellitus (HCC)     GERD (gastroesophageal reflux disease)     HBP (high blood pressure)     HCAP (healthcare-associated pneumonia) 2020    Influenza B 2020    Kidney stone     Neutropenic fever ([facility] ) 2020    Pneum[facility] to Pseudomonas species ([facility] ) 10/28/2010    Last Assessment & Plan:  Formatting of this note might be different from the original  Polymicrobial pna w/ pseudomonas, M  Abscessus, Cadida glabrata/albicans    PONV (postoperative nausea and vomiting)     Pseudomonas aeruginosa infection 2012    Squamous cell skin cancer 2023    SCCIS- Left upper thigh    Transplant recipient     Lung- ,     Wellness examination 2013    Formatting of this note might be different from the original  Discussed with patient and acknowledgement form signed on 2013 (ALYSHA)     Social History     Socioeconomic History    Marital status: /Civil Union     Spouse name: None    Number of children: None    Years of education: 12    Highest education level: None   Occupational History    Occupation: Disabled    Tobacco Use    Smoking status: Former     Packs/day: 1 00     Years: 10 00     Pack years: 10 00     Types: Cigarettes    Smokeless tobacco: Never   Vaping Use    Vaping Use: Never used   Substance and Sexual Activity    Alcohol use:  Yes     Alcohol/week: 2 0 standard drinks     Types: 2 Glasses of wine per week     Comment: social    Drug use: Yes     Frequency: 7 0 times per week     Types: Marijuana, Oxycodone     Comment: medical edible marijuana prescribed    Sexual activity: Yes     Partners: Male     Birth control/protection: Injection, Other, None     Comment: Depo Provera   Other Topics Concern    None   Social History Narrative    Most recent tobacco use screenin2019    Do you currently or have you served in the [employer] 57: No    Were you activated, into active duty, as a member of the [employer] or as a Reservist: No    Occupation: "disabled    Education: 12    Marital status:     Exercise level: Occasional    Diet: Specific    low sugar, high fat    General stress level: High    Alcohol intake: Occasional    Caffeine intake: Heavy    Chewing tobacco: none    Illicit drugs: none    Guns present in home: No    Seat belts used routinely: Yes    Smoke alarm in home: Yes    Advance directive: Yes     Social Determinants of Health     Financial Resource Strain: Not on file   Food Insecurity: No Food Insecurity    Worried About Running Out of Food in the Last Year: Never true    Ran Out of Food in the Last Year: Never true   Transportation Needs: No Transportation Needs    Lack of Transportation (Medical): No    Lack of Transportation (Non-Medical): No   Physical Activity: Not on file   Stress: Not on file   Social Connections: Not on file   Intimate Partner Violence: Not on file   Housing Stability: Low Risk     Unable to Pay for Housing in the Last Year: No    Number of Places Lived in the Last Year: 1    Unstable Housing in the Last Year: No       Subjective    Subjective Data: continue as ordered per home therapy    Objective    Physical Exam:   Assessment Type: During-treatment  General Appearance: Alert, Awake  Respiratory Pattern: Dyspnea with exertion  Chest Assessment: Chest expansion symmetrical  Bilateral Breath Sounds: Diminished, Coarse  R Breath Sounds: Diminished, Coarse  L Breath Sounds: Diminished, Coarse  Cough: None  O2 Device: RA    Vitals:  Blood pressure 125/86, pulse 71, temperature (!) 97 2 °F (36 2 °C), resp  rate 17, height 5' 2\" (1 575 m), weight 52 2 kg (115 lb), SpO2 94 %, not currently breastfeeding  Imaging and other studies: I have personally reviewed pertinent reports  O2 Device: RA     Plan    Respiratory Plan: Home Bronchodilator Patient pathway        Resp Comments: Respm protocol completed   Will continue with current tx plan   "

## 2023-05-06 NOTE — ASSESSMENT & PLAN NOTE
• Chronic, uncontrolled, as evidenced by a hemoglobin A1C of 8 5  • Switched to home regimen, lantus 10U - premeal 4-6-9 + SSI  • Better controlled with the changes today

## 2023-05-06 NOTE — ASSESSMENT & PLAN NOTE
· Past medical history of cystic fibrosis s/p pneumonectomy x2  · S/p lung transplant  · Currently patient has left-sided functioning lung  · Resume home dose of atovaquone, Prograf, Valtrex, prednisone  · Patient is also on azithromycin 3 times a week which is on hold due to being on Zosyn for now  · Follows at [facility]

## 2023-05-07 VITALS
WEIGHT: 115 LBS | TEMPERATURE: 98.7 F | BODY MASS INDEX: 21.16 KG/M2 | HEIGHT: 62 IN | SYSTOLIC BLOOD PRESSURE: 132 MMHG | DIASTOLIC BLOOD PRESSURE: 89 MMHG | RESPIRATION RATE: 17 BRPM | OXYGEN SATURATION: 93 % | HEART RATE: 94 BPM

## 2023-05-07 DIAGNOSIS — E84.9 CYSTIC FIBROSIS (HCC): ICD-10-CM

## 2023-05-07 LAB
ANION GAP SERPL CALCULATED.3IONS-SCNC: 5 MMOL/L (ref 4–13)
ANION GAP SERPL CALCULATED.3IONS-SCNC: 7 MMOL/L (ref 4–13)
ATRIAL RATE: 74 BPM
ATRIAL RATE: 74 BPM
BUN SERPL-MCNC: 24 MG/DL (ref 5–25)
BUN SERPL-MCNC: 29 MG/DL (ref 5–25)
CALCIUM SERPL-MCNC: 8.3 MG/DL (ref 8.4–10.2)
CALCIUM SERPL-MCNC: 9.1 MG/DL (ref 8.4–10.2)
CHLORIDE SERPL-SCNC: 101 MMOL/L (ref 96–108)
CHLORIDE SERPL-SCNC: 93 MMOL/L (ref 96–108)
CO2 SERPL-SCNC: 26 MMOL/L (ref 21–32)
CO2 SERPL-SCNC: 29 MMOL/L (ref 21–32)
CREAT SERPL-MCNC: 0.97 MG/DL (ref 0.6–1.3)
CREAT SERPL-MCNC: 1.01 MG/DL (ref 0.6–1.3)
GFR SERPL CREATININE-BSD FRML MDRD: 67 ML/MIN/1.73SQ M
GFR SERPL CREATININE-BSD FRML MDRD: 70 ML/MIN/1.73SQ M
GLUCOSE SERPL-MCNC: 175 MG/DL (ref 65–140)
GLUCOSE SERPL-MCNC: 185 MG/DL (ref 65–140)
GLUCOSE SERPL-MCNC: 199 MG/DL (ref 65–140)
GLUCOSE SERPL-MCNC: 213 MG/DL (ref 65–140)
POTASSIUM SERPL-SCNC: 3.9 MMOL/L (ref 3.5–5.3)
POTASSIUM SERPL-SCNC: 4 MMOL/L (ref 3.5–5.3)
PR INTERVAL: 104 MS
PR INTERVAL: 134 MS
QRS AXIS: 73 DEGREES
QRS AXIS: 75 DEGREES
QRSD INTERVAL: 74 MS
QRSD INTERVAL: 76 MS
QT INTERVAL: 404 MS
QT INTERVAL: 406 MS
QTC INTERVAL: 448 MS
QTC INTERVAL: 450 MS
SODIUM SERPL-SCNC: 129 MMOL/L (ref 135–147)
SODIUM SERPL-SCNC: 132 MMOL/L (ref 135–147)
T WAVE AXIS: 72 DEGREES
T WAVE AXIS: 72 DEGREES
VENTRICULAR RATE: 74 BPM
VENTRICULAR RATE: 74 BPM

## 2023-05-07 RX ORDER — LACTULOSE 20 G/30ML
20 SOLUTION ORAL 3 TIMES DAILY
Status: DISCONTINUED | OUTPATIENT
Start: 2023-05-07 | End: 2023-05-07 | Stop reason: HOSPADM

## 2023-05-07 RX ORDER — AMOXICILLIN AND CLAVULANATE POTASSIUM 875; 125 MG/1; MG/1
1 TABLET, FILM COATED ORAL EVERY 12 HOURS SCHEDULED
Qty: 12 TABLET | Refills: 0 | Status: SHIPPED | OUTPATIENT
Start: 2023-05-07 | End: 2023-05-13

## 2023-05-07 RX ORDER — SODIUM CHLORIDE 1 G/1
1 TABLET ORAL
Qty: 6 TABLET | Refills: 0 | Status: SHIPPED | OUTPATIENT
Start: 2023-05-07 | End: 2023-05-09

## 2023-05-07 RX ORDER — BISACODYL 5 MG/1
5 TABLET, DELAYED RELEASE ORAL
Qty: 30 TABLET | Refills: 0 | Status: SHIPPED | OUTPATIENT
Start: 2023-05-07

## 2023-05-07 RX ORDER — LACTULOSE 20 G/30ML
30 SOLUTION ORAL ONCE
Status: COMPLETED | OUTPATIENT
Start: 2023-05-07 | End: 2023-05-07

## 2023-05-07 RX ORDER — POLYETHYLENE GLYCOL 3350 17 G/17G
17 POWDER, FOR SOLUTION ORAL 2 TIMES DAILY
Refills: 0
Start: 2023-05-07

## 2023-05-07 RX ADMIN — OXYCODONE HYDROCHLORIDE 15 MG: 5 TABLET ORAL at 11:56

## 2023-05-07 RX ADMIN — LACTULOSE 20 G: 20 SOLUTION ORAL at 08:35

## 2023-05-07 RX ADMIN — ATOVAQUONE 1500 MG: 750 SUSPENSION ORAL at 08:26

## 2023-05-07 RX ADMIN — HYDROMORPHONE HYDROCHLORIDE 1 MG: 1 INJECTION, SOLUTION INTRAMUSCULAR; INTRAVENOUS; SUBCUTANEOUS at 08:35

## 2023-05-07 RX ADMIN — PREGABALIN 150 MG: 75 CAPSULE ORAL at 08:25

## 2023-05-07 RX ADMIN — LACTULOSE 20 G: 20 SOLUTION ORAL at 15:58

## 2023-05-07 RX ADMIN — DRONABINOL 5 MG: 2.5 CAPSULE ORAL at 15:58

## 2023-05-07 RX ADMIN — FAMOTIDINE 40 MG: 20 TABLET, FILM COATED ORAL at 08:25

## 2023-05-07 RX ADMIN — INSULIN LISPRO 6 UNITS: 100 INJECTION, SOLUTION INTRAVENOUS; SUBCUTANEOUS at 12:30

## 2023-05-07 RX ADMIN — LACTULOSE 30 G: 20 SOLUTION ORAL at 12:33

## 2023-05-07 RX ADMIN — TACROLIMUS 0.5 MG: 0.5 CAPSULE ORAL at 08:25

## 2023-05-07 RX ADMIN — PREDNISONE 10 MG: 10 TABLET ORAL at 08:25

## 2023-05-07 RX ADMIN — INSULIN LISPRO 4 UNITS: 100 INJECTION, SOLUTION INTRAVENOUS; SUBCUTANEOUS at 08:26

## 2023-05-07 RX ADMIN — DRONABINOL 2.5 MG: 2.5 CAPSULE ORAL at 08:25

## 2023-05-07 RX ADMIN — POLYETHYLENE GLYCOL 3350 17 G: 17 POWDER, FOR SOLUTION ORAL at 08:25

## 2023-05-07 RX ADMIN — SODIUM CHLORIDE TAB 1 GM 1 G: 1 TAB at 08:25

## 2023-05-07 RX ADMIN — OXYCODONE HYDROCHLORIDE 15 MG: 5 TABLET ORAL at 06:08

## 2023-05-07 RX ADMIN — PANCRELIPASE 48000 UNITS: 24000; 76000; 120000 CAPSULE, DELAYED RELEASE PELLETS ORAL at 08:26

## 2023-05-07 RX ADMIN — INSULIN LISPRO 1 UNITS: 100 INJECTION, SOLUTION INTRAVENOUS; SUBCUTANEOUS at 12:30

## 2023-05-07 RX ADMIN — ACETAMINOPHEN 975 MG: 325 TABLET ORAL at 13:28

## 2023-05-07 RX ADMIN — INSULIN LISPRO 1 UNITS: 100 INJECTION, SOLUTION INTRAVENOUS; SUBCUTANEOUS at 08:26

## 2023-05-07 RX ADMIN — SODIUM CHLORIDE TAB 1 GM 1 G: 1 TAB at 11:00

## 2023-05-07 RX ADMIN — LIDOCAINE 1 PATCH: 50 PATCH CUTANEOUS at 08:27

## 2023-05-07 RX ADMIN — IPRATROPIUM BROMIDE AND ALBUTEROL SULFATE 3 ML: 2.5; .5 SOLUTION RESPIRATORY (INHALATION) at 13:29

## 2023-05-07 RX ADMIN — OXYCODONE HYDROCHLORIDE 15 MG: 5 TABLET ORAL at 16:01

## 2023-05-07 RX ADMIN — HYDROMORPHONE HYDROCHLORIDE 1 MG: 1 INJECTION, SOLUTION INTRAMUSCULAR; INTRAVENOUS; SUBCUTANEOUS at 02:42

## 2023-05-07 RX ADMIN — ACETAMINOPHEN 975 MG: 325 TABLET ORAL at 06:07

## 2023-05-07 RX ADMIN — APIXABAN 5 MG: 5 TABLET, FILM COATED ORAL at 08:25

## 2023-05-07 RX ADMIN — ONDANSETRON 4 MG: 4 TABLET, ORALLY DISINTEGRATING ORAL at 08:53

## 2023-05-07 RX ADMIN — OXYCODONE HYDROCHLORIDE 10 MG: 10 TABLET ORAL at 00:19

## 2023-05-07 RX ADMIN — IPRATROPIUM BROMIDE AND ALBUTEROL SULFATE 3 ML: 2.5; .5 SOLUTION RESPIRATORY (INHALATION) at 07:31

## 2023-05-07 RX ADMIN — PIPERACILLIN AND TAZOBACTAM 3.38 G: 36; 4.5 INJECTION, POWDER, FOR SOLUTION INTRAVENOUS at 09:00

## 2023-05-07 RX ADMIN — SODIUM CHLORIDE TAB 1 GM 1 G: 1 TAB at 15:58

## 2023-05-07 NOTE — ASSESSMENT & PLAN NOTE
· Past medical history of cystic fibrosis s/p pneumonectomy x2  · S/p lung transplant  · Currently patient has left-sided functioning lung  · Resume home dose of atovaquone, Prograf, Valtrex, prednisone  · Was treated with zosyn and transitioned to PO augmentin at DC  May resume azithromycin at DC  · Cant take probiotics due to IC status  · Follows at [facility]

## 2023-05-07 NOTE — PROGRESS NOTES
"[facility]  Progress Note  Name: Debi Verma  MRN: [id number]  Unit/Bed#: -01 I Date of Admission: 5/3/2023   Date of Service: 5/7/2023 I Hospital Day: 2    Assessment/Plan   * Acute bronchitis  Assessment & Plan  · Respiratory status stable, cleared for DC on augmentin to finish 10 days total  · Continue nebulizers   · IS    Hyponatremia  Assessment & Plan  · Corrected sodium 134  · Cont salt tabs x 2 doses  · Repeat BW in 2 days and followup with PCP    Pulmonary embolus (HCC)  Assessment & Plan  · History of PE, DVT in the past   · Continue Eliquis 5 mg twice daily  Essential hypertension  Assessment & Plan  /89   Pulse 94   Temp 98 7 °F (37 1 °C)   Resp 17   Ht 5' 2\" (1 575 m)   Wt 52 2 kg (115 lb)   SpO2 93%   BMI 21 03 kg/m²   · Stable pressures  · Cont home meds    Type 1 diabetes (HCC)  Assessment & Plan  • Chronic, uncontrolled, as evidenced by a hemoglobin A1C of 8 5  • Switched to home regimen, lantus 10U - premeal 4-6-9 + SSI  • Resume home dose at DC    S/P pneumonectomy  Assessment & Plan  · Past medical history of cystic fibrosis s/p pneumonectomy x2  · S/p lung transplant  · Currently patient has left-sided functioning lung  · Resume home dose of atovaquone, Prograf, Valtrex, prednisone  · Was treated with zosyn and transitioned to PO augmentin at DC  May resume azithromycin at DC  · Cant take probiotics due to IC status  · Follows at [facility]             Medical Problems     Resolved Problems  Date Reviewed: 5/7/2023   None       Discharging Physician / Practitioner: Ricky Barajas MD  PCP: Edilma Carbajal MD  Admission Date:   Admission Orders (From admission, onward)     Ordered        05/05/23 0744  Inpatient Admission  Once            05/03/23 1749  Place in Observation  Once                      Discharge Date: 05/07/23    Consultations During Hospital Stay:  · IP CONSULT TO PULMONOLOGY    Procedures Performed:   · none    Significant " "Findings / Test Results:   · NO PE  · Ground glass opacity in left upper lobe    Incidental Findings:   · none     Test Results Pending at Discharge (will require follow up):   · none     Outpatient Tests Requested:  · BMP in 2 days     Complications:  none    Reason for Admission: SOB cough concern for PNA in an immunocompromised pt     Hospital Course:   Clemente Salazar is a 41 y o  female patient who originally presented to the hospital on 5/3/2023 due to SOB cough and chest tightness with high concern for PNA as pt has had lung transplant and is on immunosuppressants  Pt has also had multiple infections in the past and was feeling the exact same way  XR did not show any obvious consolidation but clinically pt felt better with IV zosyn  ER and SLIM reached out to her [facility] team who wanted her to stay for IV antibiotics  Her sob and chest discomfort have improved with IV antibiotics and being discharged today on PO Augmentin to finish 10 days total of antibiotics  She also got narcotics for chest pain which caused her to have constipation and required laxatives and lactulose  She says she gets constipated whenever she has to take extra opiates and lactose helps  She is passing flatus, having good Bowel sounds, No NVD  She also developed hyponatremia likely SIADH and was started on salt tabs which resolved her hyponatremia  She is asked to see her PCP in 1 week and get lab work in 2 days  She is asked to take salt tabs until tomorrow afternoon 3 doses and then get blood work the day after  Further determination of hyponatremia by PCP as OP  Pt voices understanding of the plan     Please see above list of diagnoses and related plan for additional information       Condition at Discharge: stable    Discharge Day Visit / Exam:   Subjective:  \" I am feeling better\"  Vitals: Blood Pressure: 132/89 (05/07/23 0733)  Pulse: 94 (05/07/23 0856)  Temperature: 98 7 °F (37 1 °C) (05/07/23 0733)  Temp Source: Oral (05/04/23 " "7102)  Respirations: 17 (05/07/23 0733)  Height: 5' 2\" (157 5 cm) (05/04/23 0138)  Weight - Scale: 52 2 kg (115 lb) (05/04/23 0138)  SpO2: 93 % (05/07/23 1330)  Exam:   Physical Exam General- Awake, alert and oriented x 3, looks comfortable  HEENT- Normocephalic, atraumatic, oral mucosa- moist  Neck- Supple, No carotid bruit, no JVD  CVS- Normal S1/ S2, Regular rate and rhythm, No murmur, No edema  Respiratory system- B/L clear breath sounds, no wheezing  Abdomen- Soft, Non distended, no tenderness, Bowel sound- present 4 quads  Genitourinary- No suprapubic tenderness, No CVA tenderness  Skin- No new bruise or rash  Musculoskeletal- No gross deformity  Psych- No acute psychosis  CNS- CN II- XII grossly intact, No acute focal neurologic deficit noted      Discussion with Family: Patient declined call to   Discharge instructions/Information to patient and family:   See after visit summary for information provided to patient and family  Provisions for Follow-Up Care:  See after visit summary for information related to follow-up care and any pertinent home health orders  Disposition:   Home    Planned Readmission: no     Discharge Statement:  I spent 35 minutes discharging the patient  This time was spent on the day of discharge  I had direct contact with the patient on the day of discharge  Greater than 50% of the total time was spent examining patient, answering all patient questions, arranging and discussing plan of care with patient as well as directly providing post-discharge instructions  Additional time then spent on discharge activities  Discharge Medications:  See after visit summary for reconciled discharge medications provided to patient and/or family        **Please Note: This note may have been constructed using a voice recognition system**  "

## 2023-05-07 NOTE — RESPIRATORY THERAPY NOTE
RT Protocol Note  Vanessa Fortune 41 y o  female MRN: [id number]  Unit/Bed#: -01 Encounter: [id number]    Assessment    Principal Problem:    Acute bronchitis  Active Problems:    S/P pneumonectomy    Type 1 diabetes ([facility] )    Pancreatic insufficiency    Essential hypertension    Pulmonary embolus (HCC)    Hyponatremia      Home Pulmonary Medications:         Past Medical History:   Diagnosis Date   • ABPA (allergic bronchopulmonary aspergillosis) ([facility] ) 07/08/2012   • Acute kidney injury ([facility] ) 09/08/2017   • Acute on chronic respiratory failure ([facility] ) 03/04/2017    Last Assessment & Plan:  Formatting of this note might be different from the original  Recurrent with clinical worsening with enlarging PTX, s/p multiple chest tubes, but new fevers 6/19 and worsening leukocytosis indicating infection as cause of 6/19 decompensation with profound hypoxia in setting of shunting through right lung and continued pneumothorax s/p 2nd chest tube placement  6/27: Right   • Anaphylaxis 06/06/2022   • Asthma    • Chest pain 09/21/2021   • Chronic pain    • Chronic respiratory failure with hypoxia ([place] [address] ) 09/07/2021   • COVID-19 06/03/2022   • Cystic fibrosis ([place] [address] )    • Diabetes mellitus ([place] [address] )    • GERD (gastroesophageal reflux disease)    • HBP (high blood pressure)    • HCAP (healthcare-associated pneumonia) 03/06/2020   • Influenza B 02/23/2020   • Kidney stone    • Neutropenic fever ([place] [address] ) 09/26/2020   • Pneumonia due to Pseudomonas species ([place] [address] ) 10/28/2010    Last Assessment & Plan:  Formatting of this note might be different from the original  Polymicrobial pna w/ pseudomonas, M   Abscessus, Cadida glabrata/albicans   • PONV (postoperative nausea and vomiting)    • Pseudomonas aeruginosa infection 09/20/2012   • Squamous cell skin cancer 01/12/2023    SCCIS- Left upper thigh   • Transplant recipient     Lung- 2013, 2017   • Wellness examination 02/04/2013    Formatting of this note might be different from the original  Discussed with patient and acknowledgement form signed on 2013 (ALYSHA)     Social History     Socioeconomic History   • Marital status: /Civil Union     Spouse name: None   • Number of children: None   • Years of education: 12   • Highest education level: None   Occupational History   • Occupation: Disabled    Tobacco Use   • Smoking status: Former     Packs/day: 1 00     Years: 10 00     Pack years: 10 00     Types: Cigarettes   • Smokeless tobacco: Never   Vaping Use   • Vaping Use: Never used   Substance and Sexual Activity   • Alcohol use: Yes     Alcohol/week: 2 0 standard drinks     Types: 2 Glasses of wine per week     Comment: social   • Drug use: Yes     Frequency: 7 0 times per week     Types: Marijuana, Oxycodone     Comment: medical edible marijuana prescribed   • Sexual activity: Yes     Partners: Male     Birth control/protection: Injection, Other, None     Comment: Depo Provera   Other Topics Concern   • None   Social History Narrative    Most recent tobacco use screenin2019    Do you currently or have you served in the [employer]: No    Were you activated, into active duty, as a member of the [employer] or as a Reservist: No    Occupation: disabled    Education: 15    Marital status:     Exercise level: Occasional    Diet: Specific    low sugar, high fat    General stress level: High    Alcohol intake: Occasional    Caffeine intake: Heavy    Chewing tobacco: none    Illicit drugs: none    Guns present in home: No    Seat belts used routinely: Yes    Smoke alarm in home: Yes    Advance directive: Yes     Social Determinants of Health     Financial Resource Strain: Not on file   Food Insecurity: No Food Insecurity   • Worried About Running Out of Food in the Last Year: Never true   • Ran Out of Food in the Last Year: Never true   Transportation Needs: No Transportation Needs   • Lack of Transportation (Medical): No   • Lack of Transportation (Non-Medical):  No "  Physical Activity: Not on file   Stress: Not on file   Social Connections: Not on file   Intimate Partner Violence: Not on file   Housing Stability: Low Risk    • Unable to Pay for Housing in the Last Year: No   • Number of Places Lived in the Last Year: 1   • Unstable Housing in the Last Year: No       Subjective    Subjective Data: continue as ordered per home therapy    Objective    Physical Exam:        Vitals:  Blood pressure 132/89, pulse 94, temperature 98 7 °F (37 1 °C), resp  rate 17, height 5' 2\" (1 575 m), weight 52 2 kg (115 lb), SpO2 (!) 89 %, not currently breastfeeding  Imaging and other studies: I have personally reviewed pertinent reports        O2 Device: RA     Plan    Respiratory Plan: Home Bronchodilator Patient pathway        Resp Comments: will continue with current tx plan     "

## 2023-05-07 NOTE — ASSESSMENT & PLAN NOTE
"/89   Pulse 94   Temp 98 7 °F (37 1 °C)   Resp 17   Ht 5' 2\" (1 575 m)   Wt 52 2 kg (115 lb)   SpO2 93%   BMI 21 03 kg/m²   · Stable pressures  · Cont home meds  "

## 2023-05-07 NOTE — ASSESSMENT & PLAN NOTE
• Chronic, uncontrolled, as evidenced by a hemoglobin A1C of 8 5  • Switched to home regimen, lantus 10U - premeal 4-6-9 + SSI  • Resume home dose at DC

## 2023-05-07 NOTE — ASSESSMENT & PLAN NOTE
· Respiratory status stable, cleared for DC on augmentin to finish 10 days total  · Continue nebulizers   · IS

## 2023-05-08 LAB
BACTERIA BLD CULT: NORMAL
BACTERIA BLD CULT: NORMAL

## 2023-05-08 RX ORDER — ALPRAZOLAM 1 MG/1
1 TABLET ORAL
Qty: 30 TABLET | Refills: 0 | Status: SHIPPED | OUTPATIENT
Start: 2023-05-08

## 2023-05-08 RX ORDER — OXYCODONE HYDROCHLORIDE 10 MG/1
10 TABLET ORAL EVERY 8 HOURS PRN
Qty: 90 TABLET | Refills: 0 | Status: SHIPPED | OUTPATIENT
Start: 2023-05-08

## 2023-05-08 NOTE — UTILIZATION REVIEW
NOTIFICATION OF INPATIENT ADMISSION   AUTHORIZATION REQUEST   SERVICING FACILITY:   [address] [place] [address]  Tax ID: [id number]  NPI: [id number] ATTENDING PROVIDER:  Attending Name and NPI#: Renny Diaz Md [[id number]]  Address: [address] [place] [address]  Phone: [phone]     ADMISSION INFORMATION:  Place of Service: [place] [address]  Place of Service Code: 21  Inpatient Admission Date/Time: 5/5/23  7:44 AM  Discharge Date/Time: 5/7/2023  4:00 PM  Admitting Diagnosis Code/Description:  Shortness of breath [R06 02]  Hyponatremia [E87 1]  SOB (shortness of breath) [R06 02]  Cystic fibrosis ([facility] ) [E84 9]  Transplant recipient [Z94 89]  Chest pain, musculoskeletal [R07 89]  S/P pneumonectomy [Z90 2]  Type 1 diabetes mellitus with diabetic chronic kidney disease, unspecified CKD stage ([facility] ) [E10 22]     UTILIZATION REVIEW CONTACT:  Anne-Marie Lam Utilization   Network Utilization Review Department  Phone: [phone]  Fax [phone]  Email: [email or web address]  Contact for approvals/pending authorizations, clinical reviews, and discharge  PHYSICIAN ADVISORY SERVICES:  Medical Necessity Denial & Ggbs-ff-Okbj Review  Phone: [phone]  Fax: [phone]  Email: [email or web address]

## 2023-05-09 ENCOUNTER — TRANSITIONAL CARE MANAGEMENT (OUTPATIENT)
Dept: FAMILY MEDICINE CLINIC | Facility: CLINIC | Age: 48
End: 2023-05-09

## 2023-05-09 LAB
BACTERIA SPT RESP CULT: ABNORMAL
GRAM STN SPEC: ABNORMAL

## 2023-05-10 NOTE — UTILIZATION REVIEW
NOTIFICATION OF INPATIENT ADMISSION   AUTHORIZATION REQUEST   SERVICING FACILITY:   [address] [place] [address]  Tax ID: [id number]  NPI: [id number] ATTENDING PROVIDER:  Attending Name and NPI#: Analisa Welch Md [[id number]]  Address: [address] [place] [address]  Phone: [phone]     ADMISSION INFORMATION:  Place of Service: [place] [address]  Place of Service Code: 21  Inpatient Admission Date/Time: 5/5/23  7:44 AM  Discharge Date/Time: 5/7/2023  4:00 PM  Admitting Diagnosis Code/Description:  Shortness of breath [R06 02]  Hyponatremia [E87 1]  SOB (shortness of breath) [R06 02]  Cystic fibrosis ([facility] ) [E84 9]  Transplant recipient [Z94 89]  Chest pain, musculoskeletal [R07 89]  S/P pneumonectomy [Z90 2]  Type 1 diabetes mellitus with diabetic chronic kidney disease, unspecified CKD stage ([facility] ) [E10 22]     UTILIZATION REVIEW CONTACT:  Bc Lema Utilization   Network Utilization Review Department  Phone: [phone]  Fax [phone]  Email: [email or web address]  Contact for approvals/pending authorizations, clinical reviews, and discharge  PHYSICIAN ADVISORY SERVICES:  Medical Necessity Denial & Rntv-zv-Opnv Review  Phone: [phone]  Fax: [phone]  Email: [email or web address]

## 2023-05-16 NOTE — UTILIZATION REVIEW
NOTIFICATION OF ADMISSION DISCHARGE   This is a Notification of Discharge from [facility]  Please be advised that this patient has been discharge from our facility  Below you will find the admission and discharge date and time including the patient’s disposition  UTILIZATION REVIEW CONTACT:  Fatou Moreno  Utilization   Network Utilization Review Department  Phone: [phone] x carefully listen to the prompts  All voicemails are confidential   Email: [email or web address]     ADMISSION INFORMATION  PRESENTATION DATE: 5/3/2023  1:52 PM  Douglas Palencia ADMISSION DATE: 05/03/23  INPATIENT ADMISSION DATE: 5/5/23  7:44 AM   DISCHARGE DATE: 5/7/2023  4:00 PM   DISPOSITION:Home with Home Health Care    IMPORTANT INFORMATION:  Send all requests for admission clinical reviews, approved or denied determinations and any other requests to dedicated fax number below belonging to the campus where the patient is receiving treatment   List of dedicated fax numbers:  [address] DENIALS (Administrative/Medical Necessity) [phone]   [address] (Maternity/NICU/Pediatrics) [phone]   [facility] [phone]   [facility] [phone]   [address] [phone]   [address] [facility] [phone]   [address] [place] [address] [phone]   [facility] 119 [phone]   [facility]  [phone]   [facility] [phone]   [facility] [address] [facility] [phone] Patient is here with black tarry stool for one week. She did have half her stomach removed due to her ulcers. She is on coumadin due to afib and a valve-aortic. She did have a cardiac stent placed one week ago.

## 2023-05-18 ENCOUNTER — TELEPHONE (OUTPATIENT)
Dept: GASTROENTEROLOGY | Facility: CLINIC | Age: 48
End: 2023-05-18

## 2023-05-18 NOTE — TELEPHONE ENCOUNTER
Patients GI provider:  Dr Tevin Saez     Number to return call: [phone]    Reason for call: Pt called and requested a call back to go over TIF results please review and reach out thank you   Scheduled procedure/appointment date if applicable: n/a

## 2023-05-23 ENCOUNTER — HOSPITAL ENCOUNTER (OUTPATIENT)
Dept: RADIOLOGY | Facility: HOSPITAL | Age: 48
Discharge: HOME | End: 2023-05-23

## 2023-05-23 ENCOUNTER — HOSPITAL ENCOUNTER (OUTPATIENT)
Dept: PULMONOLOGY | Facility: HOSPITAL | Age: 48
Discharge: HOME | End: 2023-05-23
Attending: INTERNAL MEDICINE

## 2023-05-23 DIAGNOSIS — Z94.2 LUNG REPLACED BY TRANSPLANT (HCC): ICD-10-CM

## 2023-05-23 DIAGNOSIS — Z94.2 LUNG TRANSPLANT STATUS (HCC): ICD-10-CM

## 2023-05-23 RX ORDER — ALBUTEROL SULFATE 2.5 MG/3ML
2.5 SOLUTION RESPIRATORY (INHALATION) ONCE
Status: COMPLETED | OUTPATIENT
Start: 2023-05-23 | End: 2023-05-23

## 2023-05-23 RX ADMIN — ALBUTEROL SULFATE 2.5 MG: 2.5 SOLUTION RESPIRATORY (INHALATION) at 12:53

## 2023-05-25 ENCOUNTER — TELEPHONE (OUTPATIENT)
Dept: GASTROENTEROLOGY | Facility: CLINIC | Age: 48
End: 2023-05-25

## 2023-05-25 NOTE — TELEPHONE ENCOUNTER
I called and left a message asking patient to call back to confirm if she is able to make an appt on 5/30 at 1220p in our [address] location with Dr Candy Puentes

## 2023-05-30 ENCOUNTER — TELEPHONE (OUTPATIENT)
Dept: GASTROENTEROLOGY | Facility: CLINIC | Age: 48
End: 2023-05-30

## 2023-05-30 ENCOUNTER — TELEPHONE (OUTPATIENT)
Dept: FAMILY MEDICINE CLINIC | Facility: OTHER | Age: 48
End: 2023-05-30

## 2023-05-30 ENCOUNTER — OFFICE VISIT (OUTPATIENT)
Dept: GASTROENTEROLOGY | Facility: CLINIC | Age: 48
End: 2023-05-30

## 2023-05-30 VITALS
WEIGHT: 114.4 LBS | HEART RATE: 74 BPM | SYSTOLIC BLOOD PRESSURE: 138 MMHG | HEIGHT: 62 IN | DIASTOLIC BLOOD PRESSURE: 93 MMHG | BODY MASS INDEX: 21.05 KG/M2

## 2023-05-30 DIAGNOSIS — E44.0 MODERATE PROTEIN-CALORIE MALNUTRITION (HCC): ICD-10-CM

## 2023-05-30 DIAGNOSIS — B37.31 YEAST INFECTION OF THE VAGINA: Primary | ICD-10-CM

## 2023-05-30 DIAGNOSIS — K52.9 COLITIS: ICD-10-CM

## 2023-05-30 DIAGNOSIS — K44.9 HIATAL HERNIA: ICD-10-CM

## 2023-05-30 DIAGNOSIS — K51.00 ULCERATIVE PANCOLITIS WITHOUT COMPLICATION (HCC): ICD-10-CM

## 2023-05-30 DIAGNOSIS — K21.9 GASTROESOPHAGEAL REFLUX DISEASE WITHOUT ESOPHAGITIS: Primary | ICD-10-CM

## 2023-05-30 DIAGNOSIS — K86.1 CHRONIC PANCREATITIS, UNSPECIFIED PANCREATITIS TYPE (HCC): ICD-10-CM

## 2023-05-30 RX ORDER — FLUCONAZOLE 150 MG/1
150 TABLET ORAL ONCE
Qty: 1 TABLET | Refills: 0 | Status: SHIPPED | OUTPATIENT
Start: 2023-05-30 | End: 2023-05-30

## 2023-05-30 RX ORDER — POLYETHYLENE GLYCOL 3350, SODIUM CHLORIDE, SODIUM BICARBONATE, POTASSIUM CHLORIDE 420; 11.2; 5.72; 1.48 G/4L; G/4L; G/4L; G/4L
4000 POWDER, FOR SOLUTION ORAL ONCE
Qty: 4000 ML | Refills: 0 | Status: SHIPPED | OUTPATIENT
Start: 2023-05-30 | End: 2023-06-05

## 2023-05-30 NOTE — TELEPHONE ENCOUNTER
The patient called asking if you can call in a script for diflucan for her yeast infection? Please advise    Thank you!

## 2023-05-30 NOTE — TELEPHONE ENCOUNTER
Faxed medication clearance to Dr Aris Rosario office for Eliquis medication hold      P - [phone]  [phone]

## 2023-05-30 NOTE — PROGRESS NOTES
[facility] - Outpatient Follow-up Note  Esdras Fortune 41 y o  female MRN: [id number]  Encounter: [id number]          ASSESSMENT AND PLAN:      1  Gastroesophageal reflux disease without esophagitis  S/p TIF, postprocedure she is doing very well, her reflux symptoms are well controlled  No history of aspiration, currently on Dexilant 60 mg p o  every morning and famotidine 40 mg p o  nightly, will stop famotidine and continue Dexilant for now, outpatient follow-up post TIF diet which was liquid diet for 2 weeks and now on soft diet  Will advance to regular food  We will schedule for EGD because of recurrent Candida esophagitis  - EGD; Future    2  Hiatal hernia  S/p TIF, Hill grade 2 hiatal hernia, partial fundoplication was done with 34 fastener were applied at GE junction  - EGD; Future    3  Colitis  History of recurrent right-sided colitis because of immunocompromise status, currently denying any abdominal pain, due for colonoscopy which we will schedule along with EGD  - Colonoscopy; Future  - polyethylene glycol-electrolytes (TriLyte) 4000 mL solution; Take 4,000 mL by mouth once for 1 dose Take 4000 mL by mouth once for 1 dose  Use as directed  Dispense: 4000 mL; Refill: 0    4  Chronic pancreatitis, unspecified pancreatitis type ([address] )  Continue with pancreatic enzyme supplement    5  Moderate protein-calorie malnutrition ([address] )  Continue with Glucerna 1 can twice a day    6  Ulcerative pancolitis without complication ([facility] [address] )  Scheduled for colonoscopy    ______________________________________________________________________    SUBJECTIVE: Patient seen and examined, she come for follow-up, she is s/p TIF for refractory GERD and small sliding hiatal hernia  Patient has a history of gastroparesis and GERD, failed multiple medication and finally TIF was done, postprocedure she is doing very well    She denying any reflux, no abdominal pain, no dysphagia or odynophagia, no episode of aspiration  She is still on Dexilant 60 mg p o  every morning and famotidine 40 mg p o  nightly, will stop Dexilant, she is due for repeat endoscopy because of recurrent Candida esophagitis which we will schedule it, she is also due for colonoscopy she want to schedule along with endoscopy, her blood sugar is well controlled      REVIEW OF SYSTEMS IS OTHERWISE NEGATIVE  Historical Information   Past Medical History:   Diagnosis Date   • ABPA (allergic bronchopulmonary aspergillosis) ([place] [address] ) 07/08/2012   • Acute kidney injury ([place] [address] ) 09/08/2017   • Acute on chronic respiratory failure ([place] [address] ) 03/04/2017    Last Assessment & Plan:  Formatting of this note might be different from the original  Recurrent with clinical worsening with enlarging PTX, s/p multiple chest tubes, but new fevers 6/19 and worsening leukocytosis indicating infection as cause of 6/19 decompensation with profound hypoxia in setting of shunting through right lung and continued pneumothorax s/p 2nd chest tube placement  6/27: Right   • Anaphylaxis 06/06/2022   • Asthma    • Chest pain 09/21/2021   • Chronic pain    • Chronic respiratory failure with hypoxia ([place] [address] ) 09/07/2021   • COVID-19 06/03/2022   • Cystic fibrosis ([place] [address] )    • Diabetes mellitus ([place] [address] )    • GERD (gastroesophageal reflux disease)    • HBP (high blood pressure)    • HCAP (healthcare-associated pneumonia) 03/06/2020   • Influenza B 02/23/2020   • Kidney stone    • Neutropenic fever ([place] [address] ) 09/26/2020   • Pneumonia due to Pseudomonas species ([place] [address] ) 10/28/2010    Last Assessment & Plan:  Formatting of this note might be different from the original  Polymicrobial pna w/ pseudomonas, M   Abscessus, Cadida glabrata/albicans   • PONV (postoperative nausea and vomiting)    • Pseudomonas aeruginosa infection 09/20/2012   • Squamous cell skin cancer 01/12/2023    SCCIS- Left upper thigh   • Transplant recipient     Lung- 2013, 2017   • Wellness examination 02/04/2013    Formatting of this note might be different from the original  Discussed with patient and acknowledgement form signed on 2/4/2013 (ALYSHA)     Past Surgical History:   Procedure Laterality Date   • APPENDECTOMY     • AUGMENTATION MAMMAPLASTY Bilateral 2004   • AUGMENTATION MAMMAPLASTY Right 2021    removed    • BREAST SURGERY      Aug     • BRONCHOSCOPY     • CHOLECYSTECTOMY     • COLONOSCOPY     • EGD  07/2019    Yeast Infect      • FL RETROGRADE PYELOGRAM  01/09/2020   • FL RETROGRADE PYELOGRAM  02/04/2020   • GASTROSTOMY TUBE, PLACE     • IR BALLOON-OCCLUDED ANTEGRADE TRANSVENOUS OBLITERATION (BATO)  11/22/2022   • IR PORT PLACEMENT  03/22/2022   • IR PORT REMOVAL  01/10/2022   • IR PORT STRIPPING  11/25/2022   • IR TUNNELED CENTRAL LINE PLACEMENT  01/13/2022   • LUNG SURGERY     • LUNG TRANSPLANT      X2   • DE CYSTO BLADDER W/URETERAL CATHETERIZATION Right 01/09/2020    Procedure: CYSTOSCOPY RETROGRADE PYELOGRAM WITH INSERTION STENT URETERAL;  Surgeon: Yoli Logan MD;  Location: [facility];  Service: Urology   • DE CYSTO/URETERO W/LITHOTRIPSY &INDWELL STENT INSRT Right 02/04/2020    Procedure: CYSTOSCOPY URETEROSCOPY WITH LITHOTRIPSY HOLMIUM LASER, RETROGRADE PYELOGRAM AND INSERTION STENT URETERAL;  Surgeon: Yoli Logan MD;  Location: [facility];  Service: Urology   • SKIN BIOPSY     • US GUIDED VASCULAR ACCESS  01/17/2017     Social History   Social History     Substance and Sexual Activity   Alcohol Use Yes   • Alcohol/week: 2 0 standard drinks of alcohol   • Types: 2 Glasses of wine per week    Comment: social     Social History     Substance and Sexual Activity   Drug Use Yes   • Frequency: 7 0 times per week   • Types: Marijuana, Oxycodone    Comment: medical edible marijuana prescribed     Social History     Tobacco Use   Smoking Status Former   • Packs/day: 1 00   • Years: 10 00   • Total pack years: 10 00   • Types: Cigarettes   Smokeless Tobacco Never     Family History   Problem Relation Age of Onset   • Alcohol abuse Mother    • Mental illness Mother    • Depression Mother    • Alcohol abuse Father    • COPD Maternal Grandmother    • Lung cancer Maternal Grandmother 72   • Cancer Maternal Grandmother    • COPD Maternal Grandfather    • Cancer Maternal Grandfather    • COPD Paternal Grandmother    • Lung cancer Paternal Grandmother 72   • No Known Problems Sister    • No Known Problems Paternal Aunt    • Breast cancer Neg Hx    • Ovarian cancer Neg Hx    • Colon cancer Neg Hx        Meds/Allergies       Current Outpatient Medications:   •  acetaminophen (TYLENOL) 325 mg tablet  •  albuterol (2 5 mg/3 mL) 0 083 % nebulizer solution  •  ALPRAZolam (XANAX) 1 mg tablet  •  atovaquone (MEPRON) 750 mg/5 mL suspension  •  azithromycin (ZITHROMAX) 250 mg tablet  •  BD Pen Needle Abi U/F 32G X 4 MM MISC  •  bisacodyl (DULCOLAX) 5 mg EC tablet  •  Cholecalciferol (Vitamin D3) 125 MCG (5000 UT) TABS  •  Continuous Blood Gluc  (Dexcom G6 ) SUKHI  •  Continuous Blood Gluc Sensor (Dexcom G6 Sensor) MISC  •  dexlansoprazole (DEXILANT) 60 MG capsule  •  dronabinol (MARINOL) 5 MG capsule  •  eszopiclone (LUNESTA) 1 mg tablet  •  famotidine (PEPCID) 40 MG tablet  •  guaifenesin-codeine (GUAIFENESIN AC) 100-10 MG/5ML liquid  •  Insulin Glargine Solostar 100 UNIT/ML SOPN  •  Insulin Pen Needle (Comfort EZ Pen Needles) 33G X 4 MM MISC  •  metoclopramide (Reglan) 10 mg tablet  •  mupirocin (BACTROBAN) 2 % ointment  •  naloxone (NARCAN) 4 mg/0 1 mL nasal spray  •  ondansetron (ZOFRAN) 4 mg tablet  •  oxyCODONE (ROXICODONE) 10 MG TABS  •  pancrelipase, Lip-Prot-Amyl, (CREON) 24,000 units  •  polyethylene glycol (MIRALAX) 17 g packet  •  polyethylene glycol-electrolytes (TriLyte) 4000 mL solution  •  predniSONE 10 mg tablet  •  pregabalin (LYRICA) 150 mg capsule  •  Respiratory Therapy Supplies (Nebulizer) SUKHI  •  Semglee, yfgn, 100 UNIT/ML SOPN  •  sodium chloride (OCEAN) 0 65 % nasal spray  •  tacrolimus (PROGRAF) 0 5 mg "capsule  •  tacrolimus (PROGRAF) 1 mg capsule  •  valACYclovir (VALTREX) 500 mg tablet  •  vitamin B-12 (VITAMIN B-12) 1,000 mcg tablet  •  apixaban (Eliquis) 5 mg  •  Continuous Blood Gluc Transmit (Dexcom G6 Transmitter) MISC  •  insulin aspart (NovoLOG FlexPen) 100 UNIT/ML injection pen  •  medroxyPROGESTERone (DEPO-PROVERA) 150 mg/mL injection  •  sodium chloride 1 g tablet    Allergies   Allergen Reactions   • Bebtelovimab Anaphylaxis   • Vancomycin Angioedema and Hives   • Gabapentin Hallucinations   • Ceftazidime Headache     Severe headaches after desensitization   • Nsaids Other (See Comments)     Lung transplant increases risk of renal toxicity, call lung txp provider to discuss if needed           Objective     Blood pressure 138/93, pulse 74, height 5' 2\" (1 575 m), weight 51 9 kg (114 lb 6 4 oz), not currently breastfeeding  Body mass index is 20 92 kg/m²  PHYSICAL EXAM:      General Appearance:   Alert, cooperative, no distress   HEENT:   Normocephalic, atraumatic, anicteric      Neck:  Supple, symmetrical, trachea midline   Lungs:   Clear to auscultation bilaterally; no rales, rhonchi or wheezing; respirations unlabored    Heart[de-identified]   Regular rate and rhythm; no murmur, rub, or gallop  Abdomen:   Soft, non-tender, non-distended; normal bowel sounds; no masses, no organomegaly    Genitalia:   Deferred    Rectal:   Deferred    Extremities:  No cyanosis, clubbing or edema    Pulses:  2+ and symmetric    Skin:  No jaundice, rashes, or lesions    Lymph nodes:  No palpable cervical lymphadenopathy        Lab Results:   No visits with results within 1 Day(s) from this visit  Latest known visit with results is:   No results displayed because visit has over 200 results  Radiology Results:   XR chest pa & lateral    Result Date: 5/25/2023  Narrative: CHEST INDICATION:   Z94 2: Lung transplant status  COMPARISON: CXR 5/6/2023 and chest CT 5/3/2023  EXAM PERFORMED/VIEWS:  XR CHEST PA & LATERAL   " DUAL ENERGY SUBTRACTION  FINDINGS: Cardiomediastinal silhouette normal  Right port  Right pneumonectomy with mediastinal shift to the right  Clear hyperinflated transplanted left lung  No pneumothorax  Median sternotomy  Upper abdomen normal  Cholecystectomy  Bones normal for age  Impression: Right pneumonectomy  Clear left lung transplant  Workstation performed: [id number]     Complete PFT with post bronchodilator    Result Date: 2023  Narrative: PULMONARY FUNCTION TEST Date of service: 23 Physician requesting PFT: Slick Youngblood MD Reason for PFT: Lung transplant status; shortness of breath on exertion INTERPRETATION:  Good patient effort and cooperation  The oxygen saturation 100% on room air at rest  The results of this test meet the ATS standards for acceptability and repeatability  The spirometry showed his FEV1 (39% of the predicted), decreased FVC (63% of the predicted) and decreased FEV1/FVC ratio (53)  The FEF 25-75 was severely reduced  There was no significant response to inhaled bronchodilator  The lung volumes showed borderline total lung capacity (77% of the predicted) and normal RV and FRC  The diffusion capacity was reduced (51% of the predicted) and getting a moderately severe loss of functional alveolar capillary surface  When corrected for volume however the diffusion capacity improved to normal range  The decreased air resistance and decrease specific conductance indicated central airway disease  The flow-volume loop showed an obstructive pattern  Impression: Severe airflow obstruction with diffusion defect  This could be due to emphysema  However there was no hyperinflation and a concurrent pulmonary vascular interstitial process could be present  Clinical correlation is advised  BENY Oscar     XR chest portable    Result Date: 2023  Narrative: CHEST INDICATION:   chest pain  COMPARISON: CXR and chest CT 5/3/2023   EXAM PERFORMED/VIEWS:  XR CHEST "PORTABLE  FINDINGS: Right port at cavoatrial junction  Cardiomediastinal silhouette normal  No acute disease  Hyperinflated left lung status post transplant for cystic fibrosis  Upper abdomen normal  Cholecystectomy  Bones normal for age  Impression: No acute cardiopulmonary disease  Right pneumonectomy and left lung transplant  Workstation performed: [id number]     CTA ED chest PE study    Result Date: 5/3/2023  Narrative: CTA - CHEST WITH IV CONTRAST - PULMONARY ANGIOGRAM INDICATION:   \"R/O PE \" Per my review of the medical record, the patient reports shortness of breath and chest pain for 3 days  History of cystic fibrosis with left lung transplant on 3/1/2017  Right pneumonectomy with myocutaneous flap on 10/14/2021  Productive cough and wheezing  COMPARISON: Chest CT [address] and 9/12/2022, CXR 5/3/2023  TECHNIQUE: CT angiogram timed for optimal opacification of the pulmonary arteries  Axial, sagittal, and coronal 2D reformats created from source data  Coronal 3D MIP postprocessing on the acquisition scanner  Radiation dose length product (DLP):  305 mGy-cm   Radiation dose exposure minimized using iterative reconstruction and automated exposure control  IV Contrast:  100 mL of iohexol (OMNIPAQUE) FINDINGS: PULMONARY ARTERIES: With mild compromised by respiratory motion, no acute pulmonary embolus  LUNGS: Mild compromised by respiratory motion  Small inflammatory groundglass opacity in the juxtapleural left upper lobe (3/61) with no acute pulmonary disease  Stable 3 mm left upper lobe nodule since at least September 2022 (3/36)  Right pneumonectomy  AIRWAYS: No significant filling defects  PLEURA: Chronic trace fluid in the right pleural space  HEART/GREAT VESSELS:  Normal for age  MEDIASTINUM AND BRUCE: Mediastinal shift of the right hemithorax due to right pneumonectomy  Dilated fluid-filled esophagus  Right port at the cavoatrial junction  CHEST WALL AND LOWER NECK: Unremarkable   UPPER ABDOMEN: " Cholecystectomy  Benign calcification in the posterior right hepatic lobe  Fatty replacement of the pancreas  OSSEOUS STRUCTURES: Median sternotomy  Surgical right rib defects due to right pneumonectomy  Impression: No pulmonary embolus  Left lung transplant with tiny focus of groundglass opacity in the left upper lobe, of doubtful significance  Dilated fluid-filled esophagus, of uncertain etiology  Workstation performed: [id number]     XR chest 1 view portable    Result Date: 5/3/2023  Narrative: CHEST INDICATION:   chest pain, SOB  COMPARISON:  None EXAM PERFORMED/VIEWS:  XR CHEST PORTABLE FINDINGS: Prior right pneumonectomy, right midline shift, cardiac silhouette obscuration  Right Mediport remains  Clear left lung  No pneumothorax nor left pleural effusion  Osseous structures appear within normal limits for patient age  Impression: Right pneumonectomy  No acute cardiopulmonary disease otherwise   Workstation performed: [id number]

## 2023-05-30 NOTE — TELEPHONE ENCOUNTER
Scheduled date of EGD/colonoscopy (as of today):6/22/23  Physician performing EGD/colonoscopy:Compa  Location of EGD/colonoscopy: [facility]  Desired bowel prep reviewed with patient: GoLytely  Instructions reviewed with patient by: Alysia Goldmann  Clearances:  None

## 2023-06-02 NOTE — TELEPHONE ENCOUNTER
Eliquis clearance came back from Dr Yakov Scruggs  Pt is cleared to hold Eliquis 2 days prior to the procedure  Please call pt to inform  Will have scanned  Thank you!

## 2023-06-05 ENCOUNTER — OFFICE VISIT (OUTPATIENT)
Dept: INTERNAL MEDICINE CLINIC | Facility: CLINIC | Age: 48
End: 2023-06-05
Payer: COMMERCIAL

## 2023-06-05 VITALS
SYSTOLIC BLOOD PRESSURE: 140 MMHG | BODY MASS INDEX: 21.11 KG/M2 | OXYGEN SATURATION: 99 % | TEMPERATURE: 95 F | HEART RATE: 76 BPM | WEIGHT: 115.4 LBS | DIASTOLIC BLOOD PRESSURE: 78 MMHG | RESPIRATION RATE: 20 BRPM

## 2023-06-05 DIAGNOSIS — E78.00 HYPERCHOLESTEREMIA: ICD-10-CM

## 2023-06-05 DIAGNOSIS — K44.9 HIATAL HERNIA: ICD-10-CM

## 2023-06-05 DIAGNOSIS — I82.409 DVT (DEEP VENOUS THROMBOSIS) (HCC): ICD-10-CM

## 2023-06-05 DIAGNOSIS — Z12.31 ENCOUNTER FOR SCREENING MAMMOGRAM FOR MALIGNANT NEOPLASM OF BREAST: ICD-10-CM

## 2023-06-05 DIAGNOSIS — I26.99 ACUTE PULMONARY EMBOLISM, UNSPECIFIED PULMONARY EMBOLISM TYPE, UNSPECIFIED WHETHER ACUTE COR PULMONALE PRESENT (HCC): ICD-10-CM

## 2023-06-05 DIAGNOSIS — M81.0 OSTEOPOROSIS, UNSPECIFIED OSTEOPOROSIS TYPE, UNSPECIFIED PATHOLOGICAL FRACTURE PRESENCE: ICD-10-CM

## 2023-06-05 DIAGNOSIS — E87.1 HYPONATREMIA: ICD-10-CM

## 2023-06-05 DIAGNOSIS — K21.00 GASTROESOPHAGEAL REFLUX DISEASE WITH ESOPHAGITIS WITHOUT HEMORRHAGE: ICD-10-CM

## 2023-06-05 DIAGNOSIS — R45.89 DEPRESSED MOOD: Primary | ICD-10-CM

## 2023-06-05 PROBLEM — J20.9 ACUTE BRONCHITIS: Status: RESOLVED | Noted: 2022-12-16 | Resolved: 2023-06-05

## 2023-06-05 PROCEDURE — 99214 OFFICE O/P EST MOD 30 MIN: CPT | Performed by: INTERNAL MEDICINE

## 2023-06-05 RX ORDER — VENLAFAXINE HYDROCHLORIDE 37.5 MG/1
37.5 CAPSULE, EXTENDED RELEASE ORAL
Qty: 30 CAPSULE | Refills: 0 | Status: SHIPPED | OUTPATIENT
Start: 2023-06-05

## 2023-06-05 NOTE — PROGRESS NOTES
Name: Magdalene Martinez      : 1975      MRN: [id number]  Encounter Provider: Mt Salas MD  Encounter Date: 2023   Encounter department: [facility]     1  Depressed mood  -     venlafaxine (EFFEXOR-XR) 37 5 mg 24 hr capsule; Take 1 capsule (37 5 mg total) by mouth daily with breakfast    2  Hyponatremia  -     Basic metabolic panel; Future  -     If <130 will likely recommend she restart low-dose NaCl tab daily, if 130-135 would recommend dietary adjustment with increase Na consumption which has helped in the past     3  Hiatal hernia   - s/p TIF procedure  Doing well  Will be following up with GI for repeat upper  and lower endoscopy  4  Gastroesophageal reflux disease with esophagitis without hemorrhage   - Sx much improved followign TIF  Only on Dexilant, No H2     5  Hypercholesteremia  -     Lipid Panel with Direct LDL reflex; Future    6  Osteoporosis, unspecified osteoporosis type, unspecified pathological fracture presence  -     DXA bone density spine hip and pelvis; Future; Expected date: 2023  -     Vitamin D 25 hydroxy; Future    7  DVT (deep venous thrombosis) (HCC)   - Anticoagulated on Eliquis    8  Acute pulmonary embolism, unspecified pulmonary embolism type, unspecified whether acute cor pulmonale present (HCC)  -     apixaban (ELIQUIS) 5 mg; Take 1 tablet (5 mg total) by mouth 2 (two) times a day    9  Encounter for screening mammogram for malignant neoplasm of breast  -     Mammo screening bilateral w 3d & cad; Future; Expected date: 2023        Depression Screening and Follow-up Plan: Patient was screened for depression during today's encounter  They screened negative with a PHQ-2 score of 1  Cruz Antunez is seen in the office today for follow-up  She continues to follow closely with her transplant team at [facility]  Recently had PFT's and CXR which looked good    States she has been having a good past few days with respect to breathing  Minimal non-productive cough  She is following with endocrinology for type 1 diabetes    She is following with OBGYN regarding menstrual bleeding  Not taking Depo anymore  We discussed barrier contraception  Mammogram ordered  She had TIF procedure with GI with much improvement in her N/V  She is not taking Reglan anymore  She is only on Dexilant, no more H2 blocker as GERD sx much improved  Will be having repeat upper and lower endoscopy  Not using Lunesta, feels that her Xanax qHS helps better with sleep  Review of Systems   Constitutional: Positive for activity change and fatigue  Negative for fever  Respiratory: Positive for cough  Negative for shortness of breath and wheezing  Cardiovascular: Positive for chest pain  Negative for palpitations and leg swelling  Gastrointestinal: Negative for abdominal distention, abdominal pain, anal bleeding, blood in stool, diarrhea, nausea and vomiting  Genitourinary: Positive for vaginal bleeding  Musculoskeletal: Positive for arthralgias  Psychiatric/Behavioral: Positive for sleep disturbance  Negative for suicidal ideas  The patient is nervous/anxious          Current Outpatient Medications on File Prior to Visit   Medication Sig   • acetaminophen (TYLENOL) 325 mg tablet Take 3 tablets (975 mg total) by mouth every 8 (eight) hours   • albuterol (2 5 mg/3 mL) 0 083 % nebulizer solution Take 6 mL (5 mg total) by nebulization 2 (two) times a day   • ALPRAZolam (XANAX) 1 mg tablet TAKE 1 TABLET (1 MG TOTAL) BY MOUTH DAILY AT BEDTIME AS NEEDED FOR ANXIETY OR SLEEP   • atovaquone (MEPRON) 750 mg/5 mL suspension Take 1,500 mg by mouth daily   • azithromycin (ZITHROMAX) 250 mg tablet Take 250 mg by mouth 3 (three) times a week   • bisacodyl (DULCOLAX) 5 mg EC tablet Take 1 tablet (5 mg total) by mouth daily at bedtime   • Cholecalciferol (Vitamin D3) 125 MCG (5000 UT) TABS Take 5,000 Units by mouth daily   • dexlansoprazole (DEXILANT) 60 MG capsule Take 1 capsule (60 mg total) by mouth in the morning  • dronabinol (MARINOL) 5 MG capsule TAKE 1 CAPSULE (5 MG TOTAL) BY MOUTH 3 (THREE) TIMES A DAY   • insulin aspart (NovoLOG FlexPen) 100 UNIT/ML injection pen Inject 4 units three times a day with meals   • naloxone (NARCAN) 4 mg/0 1 mL nasal spray 4 mg into each nostril   • oxyCODONE (ROXICODONE) 10 MG TABS Take 1 tablet (10 mg total) by mouth every 8 (eight) hours as needed for severe pain Max Daily Amount: 30 mg   • pancrelipase, Lip-Prot-Amyl, (CREON) 24,000 units Take 48,000 Units by mouth 3 (three) times a day with meals   • polyethylene glycol (MIRALAX) 17 g packet Take 17 g by mouth 2 (two) times a day   • predniSONE 10 mg tablet Take 10 mg by mouth daily   • pregabalin (LYRICA) 150 mg capsule Take 1 capsule (150 mg total) by mouth 2 (two) times a day   • Semglee, yfgn, 100 UNIT/ML SOPN Inject 0 12 mL (12 Units total) as directed daily at bedtime 12 units hs   • tacrolimus (PROGRAF) 0 5 mg capsule    • tacrolimus (PROGRAF) 1 mg capsule Take 1 mg by mouth 2 (two) times a day Take 2 tablets and a 0 5 table to total 2 5 mg twice a day 5mg total daily   • valACYclovir (VALTREX) 500 mg tablet Take 500 mg by mouth daily   • vitamin B-12 (VITAMIN B-12) 1,000 mcg tablet Take by mouth daily   • [DISCONTINUED] apixaban (Eliquis) 5 mg Take 2 tablets (10 mg total) by mouth 2 (two) times a day for 7 days, THEN 1 tablet (5 mg total) 2 (two) times a day for 23 days     • BD Pen Needle Abi U/F 32G X 4 MM MISC 4 times daily   • Continuous Blood Gluc  (Dexcom G6 ) SUKHI Use 1 Units continuous   • Continuous Blood Gluc Sensor (Dexcom G6 Sensor) MISC Use 1 Units every 7 days   • Insulin Pen Needle (Comfort EZ Pen Needles) 33G X 4 MM MISC Use to inject insulin 4 times a day   • Respiratory Therapy Supplies (Nebulizer) SUKHI Please dispense nebulizer machine   • [DISCONTINUED] Continuous Blood Gluc Transmit (Dexcom G6 Transmitter) MISC Use 1 Units continuous (Patient not taking: Reported on 5/30/2023)   • [DISCONTINUED] cyclobenzaprine (FLEXERIL) 5 mg tablet Take 1-2 tabs every 8 hours as needed for muscle pain/spasm (Patient not taking: No sig reported)   • [DISCONTINUED] eszopiclone (LUNESTA) 1 mg tablet Take 1 tablet (1 mg total) by mouth daily at bedtime as needed for sleep Take immediately before bedtime (Patient not taking: Reported on 6/5/2023)   • [DISCONTINUED] famotidine (PEPCID) 40 MG tablet TAKE 1 TABLET BY MOUTH EVERY DAY (Patient not taking: Reported on 6/5/2023)   • [DISCONTINUED] guaifenesin-codeine (GUAIFENESIN AC) 100-10 MG/5ML liquid Take 5 mL by mouth 3 (three) times a day as needed for cough (Patient not taking: Reported on 6/5/2023)   • [DISCONTINUED] Insulin Glargine Solostar 100 UNIT/ML SOPN INJECT 5 UNITS UNDER THE SKIN TWICE DAILY (Patient not taking: Reported on 6/5/2023)   • [DISCONTINUED] medroxyPROGESTERone (DEPO-PROVERA) 150 mg/mL injection Inject 1 mL (150 mg total) into a muscle every 3 (three) months (Patient not taking: Reported on 5/3/2023)   • [DISCONTINUED] metoclopramide (Reglan) 10 mg tablet Take 1 tablet (10 mg total) by mouth 3 (three) times a day (Patient not taking: Reported on 6/5/2023)   • [DISCONTINUED] mupirocin (BACTROBAN) 2 % ointment Apply topically 3 (three) times a day (Patient not taking: Reported on 6/5/2023)   • [DISCONTINUED] ondansetron (ZOFRAN) 4 mg tablet Take 1 tablet (4 mg total) by mouth every 8 (eight) hours as needed for nausea or vomiting (Patient not taking: Reported on 6/5/2023)   • [DISCONTINUED] polyethylene glycol-electrolytes (TriLyte) 4000 mL solution Take 4,000 mL by mouth once for 1 dose Take 4000 mL by mouth once for 1 dose   Use as directed   • [DISCONTINUED] sodium chloride (OCEAN) 0 65 % nasal spray 2 sprays into each nostril 2 (two) times a day (Patient not taking: Reported on 6/5/2023)   • [DISCONTINUED] sodium chloride 1 g tablet Take 1 tablet (1 g total) by mouth 3 (three) times a day with meals for 2 days (Patient not taking: Reported on 6/5/2023)   • [DISCONTINUED] sucralfate (CARAFATE) 1 g/10 mL suspension Take 10 mL (1 g total) by mouth 4 (four) times a day (with meals and at bedtime) (Patient not taking: Reported on 10/28/2022)   • [DISCONTINUED] traZODone (DESYREL) 50 mg tablet Take 0 5 tablets (25 mg total) by mouth daily at bedtime       Objective     /78 (BP Location: Right arm, Patient Position: Sitting, Cuff Size: Large)   Pulse 76   Temp (!) 95 °F (35 °C)   Resp 20   Wt 52 3 kg (115 lb 6 4 oz)   SpO2 99%   BMI 21 11 kg/m²     Physical Exam  Vitals reviewed  Constitutional:       General: She is not in acute distress  Appearance: She is not toxic-appearing  Cardiovascular:      Rate and Rhythm: Normal rate and regular rhythm  Pulmonary:      Effort: No respiratory distress  Breath sounds: No wheezing, rhonchi or rales  Abdominal:      General: There is no distension  Palpations: Abdomen is soft  Tenderness: There is no abdominal tenderness  There is no guarding  Musculoskeletal:         General: No swelling  Skin:     General: Skin is warm and dry  Neurological:      Mental Status: Mental status is at baseline         Harriett Martin MD

## 2023-06-10 NOTE — RESPIRATORY THERAPY NOTE
Patient taken off unit vent placed on ambulance transport vent without incident  Flolan being continued on transport in line with transport vent on borrowed ED syringe pump  Vital signs remain stable nothing further from respiratory at this time  ambulatory

## 2023-06-16 ENCOUNTER — ANNUAL EXAM (OUTPATIENT)
Dept: OBGYN CLINIC | Facility: MEDICAL CENTER | Age: 48
End: 2023-06-16
Payer: COMMERCIAL

## 2023-06-16 VITALS
WEIGHT: 112.8 LBS | DIASTOLIC BLOOD PRESSURE: 88 MMHG | HEIGHT: 62 IN | BODY MASS INDEX: 20.76 KG/M2 | SYSTOLIC BLOOD PRESSURE: 128 MMHG

## 2023-06-16 DIAGNOSIS — Z01.419 ENCOUNTER FOR ANNUAL ROUTINE GYNECOLOGICAL EXAMINATION: Primary | ICD-10-CM

## 2023-06-16 DIAGNOSIS — Z12.31 ENCOUNTER FOR SCREENING MAMMOGRAM FOR MALIGNANT NEOPLASM OF BREAST: ICD-10-CM

## 2023-06-16 PROCEDURE — S0612 ANNUAL GYNECOLOGICAL EXAMINA: HCPCS | Performed by: CLINICAL NURSE SPECIALIST

## 2023-06-16 NOTE — PROGRESS NOTES
Subjective:        Arun Cordero is a 41 y o  female  Here for Gynecologic Exam (Routine yearly/Pap 21 neg/neg/Mammo 3/18/22 WNL scheduled 23/Colonoscopy 19 due /BCM - Depo stopped 4 months ago )      GYN HPI  Here for annual gyn exam  Menstrual cycle:  Menstrual patttern: irregular since stopping depo  Vaginal c/o: denies  Urinary c/o: denies  Breast complaints:denies  Has implant on left side  She does do self breast Exams    Sexually active: yes  Denies issues r/t sexual activity  Contraception: none- stopped Depo about 4 months ago  She reports she feels safe at home  The following portions of the patient's history were reviewed and updated as appropriate: allergies, current medications, past family history, past medical history, past social history, past surgical history, and problem list     Hereditary Cancer Screening  Cancer-related family history includes Cancer in her maternal grandfather and maternal grandmother; Lung cancer (age of onset: 72) in her maternal grandmother and paternal grandmother  There is no history of Breast cancer, Ovarian cancer, or Colon cancer  Substance Abuse Screening Completed  See hx and flowsheet  Screens Positive for none   Medical marijuana       HEALTH MAINTENANCE SCREENINGS:    History of abnormal pap: No, Last Papanicolaou test:  2021  History of abnormal mammogram: No, Last mammogram:  2022  Last Colon Cancer Screenin2019 Not on file Not on file      Review of Systems   Constitutional: Negative for appetite change, chills, fatigue, fever and unexpected weight change  HENT: Negative  Eyes: Negative  Respiratory: Negative for chest tightness and shortness of breath  Cardiovascular: Negative for chest pain and palpitations  Gastrointestinal: Negative for abdominal pain, constipation and vomiting  Endocrine: Negative for cold intolerance and heat intolerance     Genitourinary:        As per HPI "  Musculoskeletal: Negative for back pain, joint swelling and neck pain  Skin: Negative for color change and rash  Neurological: Negative for dizziness, weakness and numbness  Hematological: Does not bruise/bleed easily  Psychiatric/Behavioral: Negative  Objective:  /88 (BP Location: Left arm, Patient Position: Sitting, Cuff Size: Standard)   Ht 5' 2\" (1 575 m)   Wt 51 2 kg (112 lb 12 8 oz)   LMP 05/20/2023 (Approximate)   BMI 20 63 kg/m²        Physical Exam  Constitutional:       General: She is not in acute distress  Appearance: Normal appearance  Genitourinary:      Vulva and rectum normal       No lesions in the vagina  Right Labia: No rash or lesions  Left Labia: No lesions or rash  No vaginal discharge, erythema, tenderness or bleeding  Right Adnexa: not tender and no mass present  Left Adnexa: not tender and no mass present  No cervical motion tenderness, discharge or friability  Uterus is not enlarged or tender  No urethral prolapse present  Pelvic exam was performed with patient in the lithotomy position  Breasts:     Breasts are symmetrical       Right: No inverted nipple, mass, nipple discharge, skin change or tenderness  Left: No inverted nipple, mass, nipple discharge, skin change or tenderness  HENT:      Head: Normocephalic and atraumatic  Cardiovascular:      Rate and Rhythm: Normal rate  Heart sounds: No murmur heard  Pulmonary:      Effort: Pulmonary effort is normal       Breath sounds: Normal breath sounds  Abdominal:      General: There is no distension  Palpations: Abdomen is soft  Tenderness: There is no abdominal tenderness  Musculoskeletal:         General: Normal range of motion  Cervical back: Normal range of motion  Lymphadenopathy:      Cervical: No cervical adenopathy  Neurological:      Mental Status: She is alert and oriented to person, place, and time     Skin:     " General: Skin is warm and dry  Psychiatric:         Mood and Affect: Mood normal          Behavior: Behavior normal    Vitals reviewed  Assessment/Plan:           [address]- Primary  Annual GYN examination completed today  Risk prevention and anticipatory guidance provided including:  • Risk for hereditary cancers: Family history reviewed and patient does not qualify for referral for genetics consult/testing  Referral to genetics oncology offered as indicated  , Colon Cancer Screening: She Is up to date on screening; Next due next month  is scheduled already  • Calcium and vitamin D supplementation  • Dietary and lifestyle recommendations based on her age and weight  body mass index is 20 63 kg/m²       • Tobacco and alcohol use, intervention ordered if applicable  Cervical cancer screening  Previous pap smears and ASCCP screening guidelines have been reviewed  Pap smear is not indicated at this time  Contraception   declines    STI Screening  STI screening is declined  STI prevention discussed with use of condoms  Breast exam and breast cancer screening  Breast exam was done; , Reviewed recommendation for yearly screening mammogram (as per ACOG, ACS, and [address] [place] [address] Departments), however, also made her aware that there are other professional organizations that may recommend deferring mammograms until age 48 or screening every other year  CBE should still be done yearly, but may miss some cancers and alone is not as effective as breast imaging   Supplemental testing may also be indicated based on lifetime risk for breast cancer as done by [facility] , She is overdue for mammo but is schedule in 2 wks    Problem List Items Addressed This Visit     Encounter for screening mammogram for malignant neoplasm of breast   Other Visit Diagnoses     Encounter for annual routine gynecological examination    -  Primary          No orders of the defined types were placed in this encounter

## 2023-06-21 DIAGNOSIS — E84.9 CYSTIC FIBROSIS (HCC): ICD-10-CM

## 2023-06-21 NOTE — TELEPHONE ENCOUNTER
Teresa my name is Pau  My birthday is [birth year]  I need doctor cardio to send in a new prescription for my Xanax  I only have a couple days left, so if you could choose, just please relay the message to him and give me a call back with any questions [phone]   Thank you

## 2023-06-22 DIAGNOSIS — E84.9 CYSTIC FIBROSIS (HCC): ICD-10-CM

## 2023-06-22 RX ORDER — ALPRAZOLAM 1 MG/1
1 TABLET ORAL
Qty: 30 TABLET | Refills: 0 | Status: SHIPPED | OUTPATIENT
Start: 2023-06-22

## 2023-06-22 RX ORDER — ALPRAZOLAM 1 MG/1
1 TABLET ORAL
Qty: 30 TABLET | Refills: 0 | OUTPATIENT
Start: 2023-06-22

## 2023-06-23 ENCOUNTER — TELEPHONE (OUTPATIENT)
Dept: GASTROENTEROLOGY | Facility: CLINIC | Age: 48
End: 2023-06-23

## 2023-06-23 NOTE — TELEPHONE ENCOUNTER
lmom confirming pt's colonoscopy/egd scheduled on 6/29/23 at [facility] with Dr Taylor Rodriguez  Informed [facility] would be calling the day prior with the arrival time  Informed of clear liquid diet day prior as well as the bowel cleansing preparation  Informed would need a  the day of the procedure due to being under sedation  I asked pt to please call back if has not received instructions or if has any questions

## 2023-06-28 DIAGNOSIS — R45.89 DEPRESSED MOOD: ICD-10-CM

## 2023-06-28 RX ORDER — VENLAFAXINE HYDROCHLORIDE 37.5 MG/1
CAPSULE, EXTENDED RELEASE ORAL
Qty: 90 CAPSULE | Refills: 1 | Status: SHIPPED | OUTPATIENT
Start: 2023-06-28

## 2023-06-29 ENCOUNTER — HOSPITAL ENCOUNTER (OUTPATIENT)
Dept: GASTROENTEROLOGY | Facility: HOSPITAL | Age: 48
Setting detail: OUTPATIENT SURGERY
End: 2023-06-29
Attending: INTERNAL MEDICINE
Payer: COMMERCIAL

## 2023-06-29 ENCOUNTER — ANESTHESIA (OUTPATIENT)
Dept: GASTROENTEROLOGY | Facility: HOSPITAL | Age: 48
End: 2023-06-29

## 2023-06-29 ENCOUNTER — ANESTHESIA EVENT (OUTPATIENT)
Dept: GASTROENTEROLOGY | Facility: HOSPITAL | Age: 48
End: 2023-06-29

## 2023-06-29 VITALS
SYSTOLIC BLOOD PRESSURE: 132 MMHG | BODY MASS INDEX: 20.08 KG/M2 | DIASTOLIC BLOOD PRESSURE: 73 MMHG | RESPIRATION RATE: 16 BRPM | WEIGHT: 109.1 LBS | HEIGHT: 62 IN | HEART RATE: 73 BPM | TEMPERATURE: 97 F | OXYGEN SATURATION: 100 %

## 2023-06-29 DIAGNOSIS — K44.9 HIATAL HERNIA: ICD-10-CM

## 2023-06-29 DIAGNOSIS — K21.9 GASTROESOPHAGEAL REFLUX DISEASE WITHOUT ESOPHAGITIS: ICD-10-CM

## 2023-06-29 DIAGNOSIS — K52.9 COLITIS: ICD-10-CM

## 2023-06-29 LAB
EXT PREGNANCY TEST URINE: NEGATIVE
EXT. CONTROL: NORMAL
GLUCOSE SERPL-MCNC: 122 MG/DL (ref 65–140)

## 2023-06-29 PROCEDURE — 81025 URINE PREGNANCY TEST: CPT | Performed by: INTERNAL MEDICINE

## 2023-06-29 PROCEDURE — 88305 TISSUE EXAM BY PATHOLOGIST: CPT | Performed by: STUDENT IN AN ORGANIZED HEALTH CARE EDUCATION/TRAINING PROGRAM

## 2023-06-29 PROCEDURE — 45380 COLONOSCOPY AND BIOPSY: CPT | Performed by: INTERNAL MEDICINE

## 2023-06-29 PROCEDURE — 82948 REAGENT STRIP/BLOOD GLUCOSE: CPT

## 2023-06-29 PROCEDURE — 43239 EGD BIOPSY SINGLE/MULTIPLE: CPT | Performed by: INTERNAL MEDICINE

## 2023-06-29 PROCEDURE — 88312 SPECIAL STAINS GROUP 1: CPT | Performed by: STUDENT IN AN ORGANIZED HEALTH CARE EDUCATION/TRAINING PROGRAM

## 2023-06-29 PROCEDURE — 88342 IMHCHEM/IMCYTCHM 1ST ANTB: CPT | Performed by: STUDENT IN AN ORGANIZED HEALTH CARE EDUCATION/TRAINING PROGRAM

## 2023-06-29 RX ORDER — SODIUM CHLORIDE, SODIUM LACTATE, POTASSIUM CHLORIDE, CALCIUM CHLORIDE 600; 310; 30; 20 MG/100ML; MG/100ML; MG/100ML; MG/100ML
INJECTION, SOLUTION INTRAVENOUS CONTINUOUS PRN
Status: DISCONTINUED | OUTPATIENT
Start: 2023-06-29 | End: 2023-06-29

## 2023-06-29 RX ORDER — LIDOCAINE HYDROCHLORIDE 10 MG/ML
INJECTION, SOLUTION EPIDURAL; INFILTRATION; INTRACAUDAL; PERINEURAL AS NEEDED
Status: DISCONTINUED | OUTPATIENT
Start: 2023-06-29 | End: 2023-06-29

## 2023-06-29 RX ORDER — PROPOFOL 10 MG/ML
INJECTION, EMULSION INTRAVENOUS AS NEEDED
Status: DISCONTINUED | OUTPATIENT
Start: 2023-06-29 | End: 2023-06-29

## 2023-06-29 RX ADMIN — SODIUM CHLORIDE, SODIUM LACTATE, POTASSIUM CHLORIDE, AND CALCIUM CHLORIDE: .6; .31; .03; .02 INJECTION, SOLUTION INTRAVENOUS at 07:29

## 2023-06-29 RX ADMIN — LIDOCAINE HYDROCHLORIDE 50 MG: 10 INJECTION, SOLUTION EPIDURAL; INFILTRATION; INTRACAUDAL; PERINEURAL at 07:32

## 2023-06-29 RX ADMIN — PROPOFOL 20 MG: 10 INJECTION, EMULSION INTRAVENOUS at 07:46

## 2023-06-29 RX ADMIN — PROPOFOL 20 MG: 10 INJECTION, EMULSION INTRAVENOUS at 07:43

## 2023-06-29 RX ADMIN — PROPOFOL 150 MG: 10 INJECTION, EMULSION INTRAVENOUS at 07:32

## 2023-06-29 RX ADMIN — PROPOFOL 50 MG: 10 INJECTION, EMULSION INTRAVENOUS at 07:36

## 2023-06-29 RX ADMIN — PROPOFOL 30 MG: 10 INJECTION, EMULSION INTRAVENOUS at 07:55

## 2023-06-29 RX ADMIN — PROPOFOL 50 MG: 10 INJECTION, EMULSION INTRAVENOUS at 07:34

## 2023-06-29 RX ADMIN — PROPOFOL 20 MG: 10 INJECTION, EMULSION INTRAVENOUS at 07:39

## 2023-06-29 RX ADMIN — PROPOFOL 20 MG: 10 INJECTION, EMULSION INTRAVENOUS at 07:52

## 2023-06-29 RX ADMIN — PROPOFOL 20 MG: 10 INJECTION, EMULSION INTRAVENOUS at 07:41

## 2023-06-29 RX ADMIN — PROPOFOL 20 MG: 10 INJECTION, EMULSION INTRAVENOUS at 07:49

## 2023-06-29 NOTE — ANESTHESIA POSTPROCEDURE EVALUATION
Post-Op Assessment Note    CV Status:  Stable  Pain Score: 0    Pain management: adequate     Mental Status:  Arousable   Hydration Status:  Euvolemic   PONV Controlled:  Controlled   Airway Patency:  Patent      Post Op Vitals Reviewed: Yes      Staff: CRNA         No notable events documented      /58 (06/29/23 0804)    Temp 97 5 °F (36 4 °C) (06/29/23 0804)    Pulse 66 (06/29/23 0804)   Resp 20 (06/29/23 0804)    SpO2 100 % (06/29/23 0804)

## 2023-06-29 NOTE — H&P
History and Physical -  [facility]  Luis Alfredo Danyelle Rasheedvineet 41 y o  female MRN: [id number]                  HPI: Yajaira Xavier is a 41y o  year old female who presents for GERD, s/p TIF, history of colitis      REVIEW OF SYSTEMS: Per the HPI, and otherwise unremarkable  Historical Information   Past Medical History:   Diagnosis Date   • ABPA (allergic bronchopulmonary aspergillosis) ([facility] ) 07/08/2012   • Acute kidney injury ([place] [address] ) 09/08/2017   • Acute on chronic respiratory failure ([place] [address] ) 03/04/2017    Last Assessment & Plan:  Formatting of this note might be different from the original  Recurrent with clinical worsening with enlarging PTX, s/p multiple chest tubes, but new fevers 6/19 and worsening leukocytosis indicating infection as cause of 6/19 decompensation with profound hypoxia in setting of shunting through right lung and continued pneumothorax s/p 2nd chest tube placement  6/27: Right   • Anaphylaxis 06/06/2022   • Anemia    • Asthma    • Chest pain 09/21/2021   • Chronic pain    • Chronic respiratory failure with hypoxia ([place] [address] ) 09/07/2021   • COVID-19 06/03/2022   • Cystic fibrosis ([place] [address] )    • Deep vein thrombosis (HCC)    • Diabetes mellitus ([place] [address] )    • GERD (gastroesophageal reflux disease)    • HBP (high blood pressure)    • HCAP (healthcare-associated pneumonia) 03/06/2020   • History of transfusion    • Influenza B 02/23/2020   • Kidney stone    • Neutropenic fever ([place] [address] ) 09/26/2020   • Pneumonia due to Pseudomonas species ([place] [address] ) 10/28/2010    Last Assessment & Plan:  Formatting of this note might be different from the original  Polymicrobial pna w/ pseudomonas, M   Abscessus, Cadida glabrata/albicans   • PONV (postoperative nausea and vomiting)    • Pseudomonas aeruginosa infection 09/20/2012   • Squamous cell skin cancer 01/12/2023    SCCIS- Left upper thigh   • Transplant recipient     Lung- 2013, 2017   • Ulcerative colitis [facility])    • Wellness examination 02/04/2013 Formatting of this note might be different from the original  Discussed with patient and acknowledgement form signed on 2/4/2013 (ALYSHA)     Past Surgical History:   Procedure Laterality Date   • APPENDECTOMY     • AUGMENTATION MAMMAPLASTY Bilateral 2004   • AUGMENTATION MAMMAPLASTY Right 2021    removed    • BREAST SURGERY      Aug     • BRONCHOSCOPY     • CHOLECYSTECTOMY     • COLONOSCOPY     • EGD  07/2019    Yeast Infect      • FL RETROGRADE PYELOGRAM  01/09/2020   • FL RETROGRADE PYELOGRAM  02/04/2020   • GASTROSTOMY TUBE, PLACE     • IR BALLOON-OCCLUDED ANTEGRADE TRANSVENOUS OBLITERATION (BATO)  11/22/2022   • IR PORT PLACEMENT  03/22/2022   • IR PORT REMOVAL  01/10/2022   • IR PORT STRIPPING  11/25/2022   • IR TUNNELED CENTRAL LINE PLACEMENT  01/13/2022   • LUNG SURGERY     • LUNG TRANSPLANT      X2   • MN CYSTO BLADDER W/URETERAL CATHETERIZATION Right 01/09/2020    Procedure: CYSTOSCOPY RETROGRADE PYELOGRAM WITH INSERTION STENT URETERAL;  Surgeon: Olga Dumont MD;  Location: [facility];  Service: Urology   • MN CYSTO/URETERO W/LITHOTRIPSY &INDWELL STENT INSRT Right 02/04/2020    Procedure: CYSTOSCOPY URETEROSCOPY WITH LITHOTRIPSY HOLMIUM LASER, RETROGRADE PYELOGRAM AND INSERTION STENT URETERAL;  Surgeon: Olga Dumont MD;  Location: [facility];  Service: Urology   • SKIN BIOPSY     • US GUIDED VASCULAR ACCESS  01/17/2017     Social History   Social History     Substance and Sexual Activity   Alcohol Use Yes   • Alcohol/week: 2 0 standard drinks of alcohol   • Types: 2 Glasses of wine per week    Comment: social     Social History     Substance and Sexual Activity   Drug Use Yes   • Frequency: 2 0 times per week   • Types: Marijuana    Comment: medical edible marijuana prescribed     Social History     Tobacco Use   Smoking Status Former   • Packs/day: 0 50   • Years: 12 00   • Total pack years: 6 00   • Types: Cigarettes   • Start date: 01/1993   • Quit date: 01/2005   • Years since quitting: "18 5   • Passive exposure: Past   Smokeless Tobacco Never     Family History   Problem Relation Age of Onset   • Alcohol abuse Mother    • Mental illness Mother    • Depression Mother    • Alcohol abuse Father    • COPD Maternal Grandmother    • Lung cancer Maternal Grandmother 72   • Cancer Maternal Grandmother    • COPD Maternal Grandfather    • Cancer Maternal Grandfather    • COPD Paternal Grandmother    • Lung cancer Paternal Grandmother 72   • No Known Problems Sister    • No Known Problems Paternal Aunt    • Breast cancer Neg Hx    • Ovarian cancer Neg Hx    • Colon cancer Neg Hx        Meds/Allergies     (Not in a hospital admission)      Allergies   Allergen Reactions   • Bebtelovimab Anaphylaxis   • Vancomycin Angioedema and Hives   • Gabapentin Hallucinations   • Ceftazidime Headache     Severe headaches after desensitization   • Nsaids Other (See Comments)     Lung transplant increases risk of renal toxicity, call lung txp provider to discuss if needed       Objective     /91   Pulse 83   Temp (!) 97 °F (36 1 °C) (Temporal)   Resp 16   Ht 5' 2\" (1 575 m)   Wt 49 5 kg (109 lb 1 6 oz)   LMP 06/28/2023 (Approximate)   SpO2 100%   BMI 19 95 kg/m²       PHYSICAL EXAM    Gen: NAD  CV: RRR  CHEST: Clear  ABD: soft, NT/ND  EXT: no edema      ASSESSMENT/PLAN:  This is a 41y o  year old female here for EGD and colonoscopy, and she is stable and optimized for her procedure        "

## 2023-07-06 ENCOUNTER — TELEPHONE (OUTPATIENT)
Dept: ENDOCRINOLOGY | Facility: CLINIC | Age: 48
End: 2023-07-06

## 2023-07-06 ENCOUNTER — OFFICE VISIT (OUTPATIENT)
Dept: DERMATOLOGY | Facility: CLINIC | Age: 48
End: 2023-07-06

## 2023-07-06 VITALS — BODY MASS INDEX: 20.98 KG/M2 | HEIGHT: 62 IN | WEIGHT: 114 LBS | TEMPERATURE: 97.2 F

## 2023-07-06 DIAGNOSIS — L81.4 LENTIGO: ICD-10-CM

## 2023-07-06 DIAGNOSIS — D48.5 NEOPLASM OF UNCERTAIN BEHAVIOR OF SKIN: ICD-10-CM

## 2023-07-06 DIAGNOSIS — D22.9 MULTIPLE NEVI: ICD-10-CM

## 2023-07-06 DIAGNOSIS — Z85.89 HISTORY OF SQUAMOUS CELL CARCINOMA: Primary | ICD-10-CM

## 2023-07-06 PROCEDURE — 88305 TISSUE EXAM BY PATHOLOGIST: CPT | Performed by: STUDENT IN AN ORGANIZED HEALTH CARE EDUCATION/TRAINING PROGRAM

## 2023-07-06 PROCEDURE — 88342 IMHCHEM/IMCYTCHM 1ST ANTB: CPT | Performed by: STUDENT IN AN ORGANIZED HEALTH CARE EDUCATION/TRAINING PROGRAM

## 2023-07-06 PROCEDURE — 88312 SPECIAL STAINS GROUP 1: CPT | Performed by: STUDENT IN AN ORGANIZED HEALTH CARE EDUCATION/TRAINING PROGRAM

## 2023-07-06 PROCEDURE — 88305 TISSUE EXAM BY PATHOLOGIST: CPT | Performed by: DERMATOLOGY

## 2023-07-06 RX ORDER — AMOXICILLIN 500 MG/1
CAPSULE ORAL
COMMUNITY
Start: 2023-06-27

## 2023-07-06 NOTE — PROGRESS NOTES
[facility] Note     Patient Name: Will Dye  Encounter Date: 07/06/2023     Have you been cared for by a [facility] in the last 3 years and, if so, which description applies to you? Yes. I have been here within the last 3 years, and my medical history has NOT changed since that time. I am FEMALE/of child-bearing potential.    REVIEW OF SYSTEMS:  Have you recently had or currently have any of the following? · No changes in my recent health. PAST MEDICAL HISTORY:  Have you personally ever had or currently have any of the following? If "YES," then please provide more detail. · No changes in my medical history. FAMILY HISTORY:  Any "first degree relatives" (parent, brother, sister, or child) with the following? • No changes in my family's known health. PATIENT EXPERIENCE:    • Do you want the Dermatologist to perform a COMPLETE skin exam today including a clinical examination under the "bra and underwear" areas? Yes  • If necessary, do we have your permission to call and leave a detailed message on your Preferred Phone number that includes your specific medical information?   Yes      Allergies   Allergen Reactions   • Bebtelovimab Anaphylaxis   • Vancomycin Angioedema and Hives   • Gabapentin Hallucinations   • Ceftazidime Headache     Severe headaches after desensitization   • Nsaids Other (See Comments)     Lung transplant increases risk of renal toxicity, call lung txp provider to discuss if needed      Current Outpatient Medications:   •  acetaminophen (TYLENOL) 325 mg tablet, Take 3 tablets (975 mg total) by mouth every 8 (eight) hours, Disp: , Rfl: 0  •  albuterol (2.5 mg/3 mL) 0.083 % nebulizer solution, Take 6 mL (5 mg total) by nebulization 2 (two) times a day, Disp: , Rfl: 0  •  ALPRAZolam (XANAX) 1 mg tablet, Take 1 tablet (1 mg total) by mouth daily at bedtime as needed for anxiety or sleep, Disp: 30 tablet, Rfl: 0  •  amoxicillin (AMOXIL) 500 mg capsule, , Disp: , Rfl:   •  apixaban (ELIQUIS) 5 mg, Take 1 tablet (5 mg total) by mouth 2 (two) times a day, Disp: 30 tablet, Rfl: 0  •  atovaquone (MEPRON) 750 mg/5 mL suspension, Take 1,500 mg by mouth daily, Disp: , Rfl:   •  azithromycin (ZITHROMAX) 250 mg tablet, Take 250 mg by mouth 3 (three) times a week, Disp: , Rfl:   •  BD Pen Needle Abi U/F 32G X 4 MM MISC, 4 times daily, Disp: 400 each, Rfl: 1  •  bisacodyl (DULCOLAX) 5 mg EC tablet, Take 1 tablet (5 mg total) by mouth daily at bedtime, Disp: 30 tablet, Rfl: 0  •  Cholecalciferol (Vitamin D3) 125 MCG (5000 UT) TABS, Take 5,000 Units by mouth daily, Disp: , Rfl:   •  Continuous Blood Gluc  (Dexcom G6 ) SUKHI, Use 1 Units continuous, Disp: 1 each, Rfl: 0  •  Continuous Blood Gluc Sensor (Dexcom G6 Sensor) MISC, Use 1 Units every 7 days, Disp: 12 each, Rfl: 0  •  dexlansoprazole (DEXILANT) 60 MG capsule, Take 1 capsule (60 mg total) by mouth in the morning., Disp: 30 capsule, Rfl: 0  •  dronabinol (MARINOL) 5 MG capsule, TAKE 1 CAPSULE (5 MG TOTAL) BY MOUTH 3 (THREE) TIMES A DAY, Disp: 90 capsule, Rfl: 3  •  insulin aspart (NovoLOG FlexPen) 100 UNIT/ML injection pen, Inject 4 units three times a day with meals, Disp: 15 mL, Rfl: 3  •  Insulin Pen Needle (Comfort EZ Pen Needles) 33G X 4 MM MISC, Use to inject insulin 4 times a day, Disp: 100 each, Rfl: 3  •  naloxone (NARCAN) 4 mg/0.1 mL nasal spray, 4 mg into each nostril, Disp: , Rfl:   •  oxyCODONE (ROXICODONE) 10 MG TABS, Take 1 tablet (10 mg total) by mouth every 8 (eight) hours as needed for severe pain Max Daily Amount: 30 mg, Disp: 90 tablet, Rfl: 0  •  pancrelipase, Lip-Prot-Amyl, (CREON) 24,000 units, Take 48,000 Units by mouth 3 (three) times a day with meals, Disp: , Rfl:   •  polyethylene glycol (MIRALAX) 17 g packet, Take 17 g by mouth 2 (two) times a day, Disp: , Rfl: 0  •  predniSONE 10 mg tablet, Take 10 mg by mouth daily, Disp: , Rfl:   •  pregabalin (LYRICA) 150 mg capsule, Take 1 capsule (150 mg total) by mouth 2 (two) times a day, Disp: 180 capsule, Rfl: 1  •  Respiratory Therapy Supplies (Nebulizer) SUKHI, Please dispense nebulizer machine, Disp: , Rfl:   •  Semglee, yfgn, 100 UNIT/ML SOPN, Inject 0.12 mL (12 Units total) as directed daily at bedtime 12 units hs, Disp: 3 mL, Rfl: 3  •  tacrolimus (PROGRAF) 0.5 mg capsule, , Disp: , Rfl:   •  tacrolimus (PROGRAF) 1 mg capsule, Take 1 mg by mouth 2 (two) times a day Take 2 tablets and a 0.5 table to total 2.5 mg twice a day 5mg total daily, Disp: , Rfl:   •  valACYclovir (VALTREX) 500 mg tablet, Take 500 mg by mouth daily, Disp: , Rfl:   •  venlafaxine (EFFEXOR-XR) 37.5 mg 24 hr capsule, TAKE 1 CAPSULE BY MOUTH DAILY WITH BREAKFAST., Disp: 90 capsule, Rfl: 1  •  vitamin B-12 (VITAMIN B-12) 1,000 mcg tablet, Take by mouth daily, Disp: , Rfl:           • Whom besides the patient is providing clinical information about today's encounter?   o NO ADDITIONAL HISTORIAN (patient alone provided history)    HISTORY OF SQUAMOUS CELL CARCINOMA     Physical Exam:  • Anatomic Location Affected:  Left upper thigh  • Morphological Description of Scar:  Well healed scar  • Suspected Recurrence: no  • Regional adenopathy: no    Additional History of Present Condition:  Lesion excised 03/16/2023. Assessment and Plan:  Based on a thorough discussion of this condition and the management approach to it (including a comprehensive discussion of the known risks, side effects and potential benefits of treatment), the patient (family) agrees to implement the following specific plan:  • Continue with 6 month skin exams  • Continue to monitor skin for any changes  • Use a moisturizer + sunscreen "combo" product such as Neutrogena Daily Defense SPF 50+ or CeraVe AM at least three times a day. How can SCC be prevented? The most important way to prevent SCC is to avoid sunburn. This is especially important in childhood and early life.  Fair skinned individuals and those with a personal or family history of BCC should protect their skin from sun exposure daily, year-round and lifelong. • Stay indoors or under the shade in the middle of the day   • Wear covering clothing   • Apply high protection factor SPF50+ broad-spectrum sunscreens generously to exposed skin if outdoors   • Avoid indoor tanning (sun beds, solaria)      What is the outlook for SCC? Most SCC are cured by treatment. Cure is most likely if treatment is undertaken when the lesion is small. A small percent of SCC's can spread to lymph  nodes and long term monitoring is indicated. They are also at increased risk of other skin cancers, especially melanoma. Regular self-skin examinations and long-term annual skin checks by an experienced health professional are recommended. MELANOCYTIC NEVI ("Moles")     Physical Exam:  • Anatomic Location Affected:   Mostly on sun-exposed areas of the trunk and extremities  • Morphological Description:  Scattered, 1-4mm round to ovoid, symmetric and evenly bordered, regularly pigmented macules/papules without outliers other than if noted elsewhere in today's note. Patient has a 4 mm mole on her right medial plantar foot and a 4 mm mole on her left heel   • Pertinent Positives:  • Pertinent Negatives:     Additional History of Present Condition:  Patient with history of double lung transplant for CF, has been on immunosuppression since 2013 (tacrolimus and prednisone currently).      Assessment and Plan:  Based on a thorough discussion of this condition and the management approach to it (including a comprehensive discussion of the known risks, side effects and potential benefits of treatment), the patient (family) agrees to implement the following specific plan:  • The patient was encouraged to use an SPF30+ broad spectrum sunscreen daily and re-apply every 2-3 outdoors while outside.  The importance of sun protection, self-skin exams, and sun avoidance was emphasized. An annual full body skin exam is recommended, and the patient was encouraged to return to the office sooner for any new or changing lesions of concerns. • Benign, reassured  • Annual skin check                Melanocytic Nevi  Melanocytic nevi ("moles") are tan or brown, raised or flat areas of the skin which have an increased number of melanocytes. Melanocytes are the cells in our body which make pigment and account for skin color.     Some moles are present at birth (I.e., "congenital nevi"), while others come up later in life (i.e., "acquired nevi"). The sun can stimulate the body to make more moles. Sunburns are not the only thing that triggers more moles. Chronic sun exposure can do it too.      Clinically distinguishing a healthy mole from melanoma may be difficult, even for experienced dermatologists. The "ABCDE's" of moles have been suggested as a means of helping to alert a person to a suspicious mole and the possible increased risk of melanoma. The suggestions for raising alert are as follows:     Asymmetry: Healthy moles tend to be symmetric, while melanomas are often asymmetric. Asymmetry means if you draw a line through the mole, the two halves do not match in color, size, shape, or surface texture. Asymmetry can be a result of rapid enlargement of a mole, the development of a raised area on a previously flat lesion, scaling, ulceration, bleeding or scabbing within the mole. Any mole that starts to demonstrate "asymmetry" should be examined promptly by a board certified dermatologist.      Border: Healthy moles tend to have discrete, even borders. The border of a melanoma often blends into the normal skin and does not sharply delineate the mole from normal skin. Any mole that starts to demonstrate "uneven borders" should be examined promptly by a board certified dermatologist.      Color: Healthy moles tend to be one color throughout.   Melanomas tend to be made up of different colors ranging from dark black, blue, white, or red. Any mole that demonstrates a color change should be examined promptly by a board certified dermatologist.      Diameter: Healthy moles tend to be smaller than 0.6 cm in size; an exception are "congenital nevi" that can be larger. Melanomas tend to grow and can often be greater than 0.6 cm (1/4 of an inch, or the size of a pencil eraser). This is only a guideline, and many normal moles may be larger than 0.6 cm without being unhealthy. Any mole that starts to change in size (small to bigger or bigger to smaller) should be examined promptly by a board certified dermatologist.      Evolving: Healthy moles tend to "stay the same."  Melanomas may often show signs of change or evolution such as a change in size, shape, color, or elevation.   Any mole that starts to itch, bleed, crust, burn, hurt, or ulcerate or demonstrate a change or evolution should be examined promptly by a board certified dermatologist.      L[place]IGO     Physical Exam:  • Anatomic Location Affected:  Face, neck, chest   • Morphological Description:  Light brown well demarcated macules on sun exposed skin with reassuring dermoscopy  • Pertinent Positives:  • Pertinent Negatives:     Additional History of Present Condition:       Assessment and Plan:  Based on a thorough discussion of this condition and the management approach to it (including a comprehensive discussion of the known risks, side effects and potential benefits of treatment), the patient (family) agrees to implement the following specific plan:  • When outside we recommend using a wide brim hat, sunglasses, long sleeve and pants, sunscreen with SPF 64+ with reapplication every 2 hours, or SPF specific clothing      NEOPLASM OF UNCERTAIN BEHAVIOR OF SKIN    Physical Exam:  • (Anatomic Location); (Size and Morphological Description); (Differential Diagnosis):  o Central chest; 3 mm pink brown papule; possible pigmented basal cell carinoma  • Pertinent Positives:  • Pertinent Negatives: Additional History of Present Condition:  Present for a few months. Assessment and Plan:  • I have discussed with the patient that a sample of skin via a "skin biopsy” would be potentially helpful to further make a specific diagnosis under the microscope. • Based on a thorough discussion of this condition and the management approach to it (including a comprehensive discussion of the known risks, side effects and potential benefits of treatment), the patient (family) agrees to implement the following specific plan:    o Procedure:  Skin Biopsy. After a thorough discussion of treatment options and risk/benefits/alternatives (including but not limited to local pain, scarring, dyspigmentation, blistering, possible superinfection, and inability to confirm a diagnosis via histopathology), verbal and written consent were obtained and portion of the rash was biopsied for tissue sample. See below for consent that was obtained from patient and subsequent Procedure Note. PROCEDURE TANGENTIAL (SHAVE) BIOPSY NOTE:    • Performing Physician: João Mcknight  • Anatomic Location; Clinical Description with size (cm); Pre-Op Diagnosis:      Central chest; 3 mm pink brown papule; possible basal cell carinoma  • Post-op diagnosis: Same     • Local anesthesia: 1% xylocaine with epi      • Topical anesthesia: None    • Hemostasis: Aluminum chloride       After obtaining informed consent  at which time there was a discussion about the purpose of biopsy  and low risks of infection and bleeding. The area was prepped and draped in the usual fashion. Anesthesia was obtained with 1% lidocaine with epinephrine. A shave biopsy to an appropriate sampling depth was obtained by Shave (Dermablade or 15 blade) The resulting wound was covered with surgical ointment and bandaged appropriately.      The patient tolerated the procedure well without complications and was without signs of functional compromise. Specimen has been sent for review by Dermatopathology. Standard post-procedure care has been explained and has been included in written form within the patient's copy of Informed Consent. INFORMED CONSENT DISCUSSION AND POST-OPERATIVE INSTRUCTIONS FOR PATIENT    I.  RATIONALE FOR PROCEDURE  I understand that a skin biopsy allows the Dermatologist to test a lesion or rash under the microscope to obtain a diagnosis. It usually involves numbing the area with numbing medication and removing a small piece of skin; sometimes the area will be closed with sutures. In this specific procedure, sutures are not usually needed. If any sutures are placed, then they are usually need to be removed in 2 weeks or less. I understand that my Dermatologist recommends that a skin "shave" biopsy be performed today. A local anesthetic, similar to the kind that a dentist uses when filling a cavity, will be injected with a very small needle into the skin area to be sampled. The injected skin and tissue underneath "will go to sleep” and become numb so no pain should be felt afterwards. An instrument shaped like a tiny "razor blade" (shave biopsy instrument) will be used to cut a small piece of tissue and skin from the area so that a sample of tissue can be taken and examined more closely under the microscope. A slight amount of bleeding will occur, but it will be stopped with direct pressure and a pressure bandage and any other appropriate methods. I understands that a scar will form where the wound was created. Surgical ointment will be applied to help protect the wound. Sutures are not usually needed.     II.  RISKS AND POTENTIAL COMPLICATIONS   I understand the risks and potential complications of a skin biopsy include but are not limited to the following:  • Bleeding  • Infection  • Pain  • Scar/keloid  • Skin discoloration  • Incomplete Removal  • Recurrence  • Nerve Damage/Numbness/Loss of Function  • Allergic Reaction to Anesthesia  • Biopsies are diagnostic procedures and based on findings additional treatment or evaluation may be required  • Loss or destruction of specimen resulting in no additional findings    My Dermatologist has explained to me the nature of the condition, the nature of the procedure, and the benefits to be reasonably expected compared with alternative approaches. My Dermatologist has discussed the likelihood of major risks or complications of this procedure including the specific risks listed above, such as bleeding, infection, and scarring/keloid. I understand that a scar is expected after this procedure. I understand that my physician cannot predict if the scar will form a "keloid," which extends beyond the borders of the wound that is created. A keloid is a thick, painful, and bumpy scar. A keloid can be difficult to treat, as it does not always respond well to therapy, which includes injecting cortisone directly into the keloid every few weeks. While this usually reduces the pain and size of the scar, it does not eliminate it. I understand that photographs may be taken before and after the procedure. These will be maintained as part of the medical providers confidential records and may not be made available to me. I further authorize the medical provider to use the photographs for teaching purposes or to illustrate scientific papers, books, or lectures if in his/her judgment, medical research, education, or science may benefit from its use. I have had an opportunity to fully inquire about the risks and benefits of this procedure and its alternatives. I have been given ample time and opportunity to ask questions and to seek a second opinion if I wished to do so. I acknowledge that there have specifically been no guarantees as to the cosmetic results from the procedure.   I am aware that with any procedure there is always the possibility of an unexpected complication. III. POST-PROCEDURAL CARE (WHAT YOU WILL NEED TO DO "AFTER THE BIOPSY" TO OPTIMIZE HEALING)    • Keep the area clean and dry. Try NOT to remove the bandage or get it wet for the first 24 hours. • Gently clean the area and apply surgical ointment (such as Vaseline petrolatum ointment, which is available "over the counter" and not a prescription) to the biopsy site for up to 2 weeks straight. This acts to protect the wound from the outside world. • Sutures are not usually placed in this procedure. If any sutures were placed, return for suture removal as instructed (generally 1 week for the face, 2 weeks for the body). • Take Acetaminophen (Tylenol) for discomfort, if no contraindications. Ibuprofen or aspirin could make bleeding worse. • Call our office immediately for signs of infection: fever, chills, increased redness, warmth, tenderness, discomfort/pain, or pus or foul smell coming from the wound. WHAT TO DO IF THERE IS ANY BLEEDING? If a small amount of bleeding is noticed, place a clean cloth over the area and apply firm pressure for ten minutes. Check the wound after 10 minutes of direct pressure. If bleeding persists, try one more time for an additional 10 minutes of direct pressure on the area. If the bleeding becomes heavier or does not stop after the second attempt, or if you have any other questions about this procedure, then please call your [facility] by calling [phone] (SKIN). I hereby acknowledge that I have reviewed and verified the site with my Dermatologist and have requested and authorized my Dermatologist to proceed with the procedure.           Scribe Attestation    I,:  Romario Ellington am acting as a scribe while in the presence of the attending physician.:       I,:  Stephanie Dorsey MD personally performed the services described in this documentation    as scribed in my presence.:           Physical Exam and Assessment/Plan by Diagnosis:

## 2023-07-06 NOTE — PATIENT INSTRUCTIONS
HISTORY OF SQUAMOUS CELL CARCINOMA     Assessment and Plan:  Based on a thorough discussion of this condition and the management approach to it (including a comprehensive discussion of the known risks, side effects and potential benefits of treatment), the patient (family) agrees to implement the following specific plan:  Continue with 6 month skin exams  Continue to monitor skin for any changes  Use a moisturizer + sunscreen "combo" product such as Neutrogena Daily Defense SPF 50+ or CeraVe AM at least three times a day. How can SCC be prevented? The most important way to prevent SCC is to avoid sunburn. This is especially important in childhood and early life. Fair skinned individuals and those with a personal or family history of BCC should protect their skin from sun exposure daily, year-round and lifelong. Stay indoors or under the shade in the middle of the day   Wear covering clothing   Apply high protection factor SPF50+ broad-spectrum sunscreens generously to exposed skin if outdoors   Avoid indoor tanning (sun beds, solaria)      What is the outlook for SCC? Most SCC are cured by treatment. Cure is most likely if treatment is undertaken when the lesion is small. A small percent of SCC's can spread to lymph  nodes and long term monitoring is indicated. They are also at increased risk of other skin cancers, especially melanoma. Regular self-skin examinations and long-term annual skin checks by an experienced health professional are recommended. MELANOCYTIC NEVI ("Moles")    Assessment and Plan:  Based on a thorough discussion of this condition and the management approach to it (including a comprehensive discussion of the known risks, side effects and potential benefits of treatment), the patient (family) agrees to implement the following specific plan:  The patient was encouraged to use an SPF30+ broad spectrum sunscreen daily and re-apply every 2-3 outdoors while outside.  The importance of sun protection, self-skin exams, and sun avoidance was emphasized. An annual full body skin exam is recommended, and the patient was encouraged to return to the office sooner for any new or changing lesions of concerns. Benign, reassured  Annual skin check                Melanocytic Nevi  Melanocytic nevi ("moles") are tan or brown, raised or flat areas of the skin which have an increased number of melanocytes. Melanocytes are the cells in our body which make pigment and account for skin color. Some moles are present at birth (I.e., "congenital nevi"), while others come up later in life (i.e., "acquired nevi"). The sun can stimulate the body to make more moles. Sunburns are not the only thing that triggers more moles. Chronic sun exposure can do it too. Clinically distinguishing a healthy mole from melanoma may be difficult, even for experienced dermatologists. The "ABCDE's" of moles have been suggested as a means of helping to alert a person to a suspicious mole and the possible increased risk of melanoma. The suggestions for raising alert are as follows:     Asymmetry: Healthy moles tend to be symmetric, while melanomas are often asymmetric. Asymmetry means if you draw a line through the mole, the two halves do not match in color, size, shape, or surface texture. Asymmetry can be a result of rapid enlargement of a mole, the development of a raised area on a previously flat lesion, scaling, ulceration, bleeding or scabbing within the mole. Any mole that starts to demonstrate "asymmetry" should be examined promptly by a board certified dermatologist.      Border: Healthy moles tend to have discrete, even borders. The border of a melanoma often blends into the normal skin and does not sharply delineate the mole from normal skin. Any mole that starts to demonstrate "uneven borders" should be examined promptly by a board certified dermatologist.      Color: Healthy moles tend to be one color throughout. Melanomas tend to be made up of different colors ranging from dark black, blue, white, or red. Any mole that demonstrates a color change should be examined promptly by a board certified dermatologist.      Diameter: Healthy moles tend to be smaller than 0.6 cm in size; an exception are "congenital nevi" that can be larger. Melanomas tend to grow and can often be greater than 0.6 cm (1/4 of an inch, or the size of a pencil eraser). This is only a guideline, and many normal moles may be larger than 0.6 cm without being unhealthy. Any mole that starts to change in size (small to bigger or bigger to smaller) should be examined promptly by a board certified dermatologist.      Evolving: Healthy moles tend to "stay the same."  Melanomas may often show signs of change or evolution such as a change in size, shape, color, or elevation. Any mole that starts to itch, bleed, crust, burn, hurt, or ulcerate or demonstrate a change or evolution should be examined promptly by a board certified dermatologist.      Sanjuanita Moses and Plan:  Based on a thorough discussion of this condition and the management approach to it (including a comprehensive discussion of the known risks, side effects and potential benefits of treatment), the patient (family) agrees to implement the following specific plan:  When outside we recommend using a wide brim hat, sunglasses, long sleeve and pants, sunscreen with SPF 05+ with reapplication every 2 hours, or SPF specific clothing      NEOPLASM OF UNCERTAIN BEHAVIOR OF SKIN    Assessment and Plan:  I have discussed with the patient that a sample of skin via a "skin biopsy” would be potentially helpful to further make a specific diagnosis under the microscope.   Based on a thorough discussion of this condition and the management approach to it (including a comprehensive discussion of the known risks, side effects and potential benefits of treatment), the patient (family) agrees to implement the following specific plan:    Procedure:  Skin Biopsy. After a thorough discussion of treatment options and risk/benefits/alternatives (including but not limited to local pain, scarring, dyspigmentation, blistering, possible superinfection, and inability to confirm a diagnosis via histopathology), verbal and written consent were obtained and portion of the rash was biopsied for tissue sample. See below for consent that was obtained from patient and subsequent Procedure Note. PROCEDURE TANGENTIAL (SHAVE) BIOPSY NOTE:      INFORMED CONSENT DISCUSSION AND POST-OPERATIVE INSTRUCTIONS FOR PATIENT    I.  RATIONALE FOR PROCEDURE  I understand that a skin biopsy allows the Dermatologist to test a lesion or rash under the microscope to obtain a diagnosis. It usually involves numbing the area with numbing medication and removing a small piece of skin; sometimes the area will be closed with sutures. In this specific procedure, sutures are not usually needed. If any sutures are placed, then they are usually need to be removed in 2 weeks or less. I understand that my Dermatologist recommends that a skin "shave" biopsy be performed today. A local anesthetic, similar to the kind that a dentist uses when filling a cavity, will be injected with a very small needle into the skin area to be sampled. The injected skin and tissue underneath "will go to sleep” and become numb so no pain should be felt afterwards. An instrument shaped like a tiny "razor blade" (shave biopsy instrument) will be used to cut a small piece of tissue and skin from the area so that a sample of tissue can be taken and examined more closely under the microscope. A slight amount of bleeding will occur, but it will be stopped with direct pressure and a pressure bandage and any other appropriate methods. I understands that a scar will form where the wound was created. Surgical ointment will be applied to help protect the wound.   Sutures are not usually needed. II.  RISKS AND POTENTIAL COMPLICATIONS   I understand the risks and potential complications of a skin biopsy include but are not limited to the following:  Bleeding  Infection  Pain  Scar/keloid  Skin discoloration  Incomplete Removal  Recurrence  Nerve Damage/Numbness/Loss of Function  Allergic Reaction to Anesthesia  Biopsies are diagnostic procedures and based on findings additional treatment or evaluation may be required  Loss or destruction of specimen resulting in no additional findings    My Dermatologist has explained to me the nature of the condition, the nature of the procedure, and the benefits to be reasonably expected compared with alternative approaches. My Dermatologist has discussed the likelihood of major risks or complications of this procedure including the specific risks listed above, such as bleeding, infection, and scarring/keloid. I understand that a scar is expected after this procedure. I understand that my physician cannot predict if the scar will form a "keloid," which extends beyond the borders of the wound that is created. A keloid is a thick, painful, and bumpy scar. A keloid can be difficult to treat, as it does not always respond well to therapy, which includes injecting cortisone directly into the keloid every few weeks. While this usually reduces the pain and size of the scar, it does not eliminate it. I understand that photographs may be taken before and after the procedure. These will be maintained as part of the medical providers confidential records and may not be made available to me. I further authorize the medical provider to use the photographs for teaching purposes or to illustrate scientific papers, books, or lectures if in his/her judgment, medical research, education, or science may benefit from its use. I have had an opportunity to fully inquire about the risks and benefits of this procedure and its alternatives.    I have been given ample time and opportunity to ask questions and to seek a second opinion if I wished to do so. I acknowledge that there have specifically been no guarantees as to the cosmetic results from the procedure. I am aware that with any procedure there is always the possibility of an unexpected complication. III. POST-PROCEDURAL CARE (WHAT YOU WILL NEED TO DO "AFTER THE BIOPSY" TO OPTIMIZE HEALING)    Keep the area clean and dry. Try NOT to remove the bandage or get it wet for the first 24 hours. Gently clean the area and apply surgical ointment (such as Vaseline petrolatum ointment, which is available "over the counter" and not a prescription) to the biopsy site for up to 2 weeks straight. This acts to protect the wound from the outside world. Sutures are not usually placed in this procedure. If any sutures were placed, return for suture removal as instructed (generally 1 week for the face, 2 weeks for the body). Take Acetaminophen (Tylenol) for discomfort, if no contraindications. Ibuprofen or aspirin could make bleeding worse. Call our office immediately for signs of infection: fever, chills, increased redness, warmth, tenderness, discomfort/pain, or pus or foul smell coming from the wound. WHAT TO DO IF THERE IS ANY BLEEDING? If a small amount of bleeding is noticed, place a clean cloth over the area and apply firm pressure for ten minutes. Check the wound after 10 minutes of direct pressure. If bleeding persists, try one more time for an additional 10 minutes of direct pressure on the area. If the bleeding becomes heavier or does not stop after the second attempt, or if you have any other questions about this procedure, then please call your [facility] by calling [phone] (SKIN). I hereby acknowledge that I have reviewed and verified the site with my Dermatologist and have requested and authorized my Dermatologist to proceed with the procedure.

## 2023-07-12 ENCOUNTER — TELEPHONE (OUTPATIENT)
Dept: DERMATOLOGY | Facility: CLINIC | Age: 48
End: 2023-07-12

## 2023-07-12 PROCEDURE — 88305 TISSUE EXAM BY PATHOLOGIST: CPT | Performed by: DERMATOLOGY

## 2023-07-13 ENCOUNTER — TELEPHONE (OUTPATIENT)
Dept: DERMATOLOGY | Facility: CLINIC | Age: 48
End: 2023-07-13

## 2023-07-17 NOTE — TELEPHONE ENCOUNTER
Called patient to offer appt with Dr. Dorota Aleman for tomorrow for the excision. Left a message for pt to call back if interested.

## 2023-07-20 DIAGNOSIS — E84.9 CYSTIC FIBROSIS (HCC): ICD-10-CM

## 2023-07-20 RX ORDER — ALPRAZOLAM 1 MG/1
1 TABLET ORAL
Qty: 30 TABLET | Refills: 0 | Status: SHIPPED | OUTPATIENT
Start: 2023-07-20

## 2023-07-20 RX ORDER — OXYCODONE HYDROCHLORIDE 10 MG/1
10 TABLET ORAL EVERY 8 HOURS PRN
Qty: 90 TABLET | Refills: 0 | Status: SHIPPED | OUTPATIENT
Start: 2023-07-20

## 2023-07-20 NOTE — TELEPHONE ENCOUNTER
Hi, this is [facility] and I am a patient of Doctor Cardio and I'm just calling to have two new prescriptions called into the [facility]. One would be the OXY 10 milligrams, I believe it's milligrams on the early [place] and the other one is the Xanax 1 milligram. If you have any questions, please call me back. My phone number is area code [phone]. Thank you and have a great day.

## 2023-07-28 ENCOUNTER — TELEPHONE (OUTPATIENT)
Dept: INTERNAL MEDICINE CLINIC | Facility: CLINIC | Age: 48
End: 2023-07-28

## 2023-07-28 NOTE — TELEPHONE ENCOUNTER
Teresa, this is Pau torrez. I am calling for doctor Cardio. Regarding a prescription that he recently put me on. Can you please have him call me back [phone]?  Thank you

## 2023-07-28 NOTE — TELEPHONE ENCOUNTER
Spoke to patient over the phone this morning regarding Effexor. Patient has been on the medication for approximately 6 weeks and reports she is not liking how she feels on it. Patient says she normally would cry 2-3 weeks but has been unable to do so since starting the medication. She feels as if she has a lot of emotions but the medication is not letting her feel them. Patient requesting a medication change. I will ask Laly to schedule her an appointment to be seen in the clinic ideally within the next week to follow up on her depressed mood. In the meantime I instructed patient to take her effexor only every other day to begin tapering off the medication.

## 2023-07-31 ENCOUNTER — TELEMEDICINE (OUTPATIENT)
Dept: ENDOCRINOLOGY | Facility: CLINIC | Age: 48
End: 2023-07-31
Payer: COMMERCIAL

## 2023-07-31 DIAGNOSIS — E78.00 HYPERCHOLESTEREMIA: ICD-10-CM

## 2023-07-31 DIAGNOSIS — K86.89 PANCREATIC INSUFFICIENCY: ICD-10-CM

## 2023-07-31 DIAGNOSIS — E10.22 TYPE 1 DIABETES MELLITUS WITH DIABETIC CHRONIC KIDNEY DISEASE, UNSPECIFIED CKD STAGE (HCC): Primary | ICD-10-CM

## 2023-07-31 DIAGNOSIS — K31.84 GASTROPARESIS: ICD-10-CM

## 2023-07-31 PROCEDURE — 99214 OFFICE O/P EST MOD 30 MIN: CPT | Performed by: INTERNAL MEDICINE

## 2023-07-31 RX ORDER — ACYCLOVIR 400 MG/1
1 TABLET ORAL
Qty: 3 EACH | Refills: 5 | Status: SHIPPED | OUTPATIENT
Start: 2023-07-31

## 2023-07-31 RX ORDER — ACYCLOVIR 400 MG/1
1 TABLET ORAL CONTINUOUS
Qty: 1 EACH | Refills: 0 | Status: SHIPPED | OUTPATIENT
Start: 2023-07-31

## 2023-07-31 NOTE — PROGRESS NOTES
REQUIRED DOCUMENTATION:      1. This service was provided via Telemedicine. 2. Provider located at [place], [place]. 3. TeleMed provider: Ryland Lopez MD.  4. Identify all parties in room with patient during tele consult:  The patient, advanced practitioner and bedside nurse  5. Patient was then informed that this was a Telemedicine visit and that the exam was being conducted confidentially over secure lines. My office door was closed. No one else was in the room. Patient acknowledged consent and understanding of privacy and security of the Telemedicine visit, and gave us permission to have the assistant stay in the room in order to assist with the history and to conduct the exam.  I informed the patient that I have reviewed their record in Epic and presented the opportunity for them to ask any questions regarding the visit today. The patient agreed to participate. Patient is aware this is a billable service. Follow-up Patient Progress Note      CC: Type 1 diabetes     History of Present Illness:   42 yr female with hx of cystic fibrosis diagnosed age 7 months s/p redo Lt lung transplant 3/2017 and follow up by [facility], recurrent PNA c pseudomonas and long term steroids, Type 1 diabetes, gastroparesis, pulmonary HTN, HTN, hx of DVT on apixaban, osteoporosis being managed by PCP, anemia, chronic pain and chronic glucocorticoid and immunosuppressive therapy.  Last visit was 2/8/23. Since last visit, weight increased 2-3 lbs. She has been on insulin therapy for cystic fibrosis related diabetes for about 8 years associated with insulin deficiency as established at [address] [place] [address].     Since last visit, her weight is down by 4 lbs.  She reports some significant postprandial hyperglycemia after lunch and dinner. Reports fasting normoglycemia in the 80 to 110 mg/dL range.   No reported hypoglycemia.     Current meds:  Glargine 12u QHS  Humalog 5u - 7u - 8u plus 1 unit /50 mg/dL above 150 mg/dL.     Opthamology: No  Podiatry: No  Vaccination:    Dental:  Pancreatitis: yes     Ace/ARB: No  Statin:No  Thyroid issues: No    Patient Active Problem List   Diagnosis   • S/P pneumonectomy   • Type 1 diabetes ([address])   • Pancreatic insufficiency   • Asthma   • Chronic pain disorder   • DVT (deep venous thrombosis) (HCC)   • Acute on chronic anemia   • Long term current use of bisphosphonates   • Long term current use of systemic steroids   • Osteoporosis   • Anxiety   • CKD (chronic kidney disease) stage 2, GFR 60-89 ml/min   • Chronic sinusitis   • Colon polyp   • Esophageal reflux   • Hiatal hernia   • Immunosuppression (HCC)   • Severe protein-calorie malnutrition (HCC)   • Transplant rejection   • Cystic fibrosis (HCC)   • Essential hypertension   • Hypercholesteremia   • Weakness of right lower extremity   • Moderate protein-calorie malnutrition (HCC)   • Neuropathic pain of right foot   • Ulcerative pancolitis without complication (HCC)   • Gastroparesis   • Abnormal uterine bleeding (AUB)   • Pulmonary hypertension (HCC)   • Renal calculus   • Pulmonary nodules   • Rib pain   • Chronic cough   • Right leg pain   • Pulmonary embolus (HCC)   • Transplant recipient   • Abdominal wall lump   • Hyponatremia   • Chronic pancreatitis, unspecified pancreatitis type (HCC)   • Depressed mood   • Encounter for screening mammogram for malignant neoplasm of breast     Past Medical History:   Diagnosis Date   • ABPA (allergic bronchopulmonary aspergillosis) ([address]) 07/08/2012   • Acute kidney injury ([address]) 09/08/2017   • Acute on chronic respiratory failure ([address]) 03/04/2017    Last Assessment & Plan:  Formatting of this note might be different from the original. Recurrent with clinical worsening with enlarging PTX, s/p multiple chest tubes, but new fevers 6/19 and worsening leukocytosis indicating infection as cause of 6/19 decompensation with profound hypoxia in setting of shunting through right lung and continued pneumothorax s/p 2nd chest tube placement. 6/27: Right   • Anaphylaxis 06/06/2022   • Anemia    • Asthma    • Chest pain 09/21/2021   • Chronic pain    • Chronic respiratory failure with hypoxia ([address]) 09/07/2021   • COVID-19 06/03/2022   • Cystic fibrosis ([address])    • Deep vein thrombosis (HCC)    • Diabetes mellitus ([address])    • GERD (gastroesophageal reflux disease)    • HBP (high blood pressure)    • HCAP (healthcare-associated pneumonia) 03/06/2020   • History of transfusion    • Influenza B 02/23/2020   • Kidney stone    • Neutropenic fever ([address]) 09/26/2020   • Pneumonia due to Pseudomonas species ([address]) 10/28/2010    Last Assessment & Plan:  Formatting of this note might be different from the original. Polymicrobial pna w/ pseudomonas, M. Abscessus, Cadida glabrata/albicans   • PONV (postoperative nausea and vomiting)    • Pseudomonas aeruginosa infection 09/20/2012   • Squamous cell skin cancer 01/12/2023    SCCIS- Left upper thigh   • Transplant recipient     Lung- 2013, 2017   • Ulcerative colitis [facility])    • Wellness examination 02/04/2013    Formatting of this note might be different from the original. Discussed with patient and acknowledgement form signed on 2/4/2013 (ALYSHA)      Past Surgical History:   Procedure Laterality Date   • APPENDECTOMY     • AUGMENTATION MAMMAPLASTY Bilateral 2004   • AUGMENTATION MAMMAPLASTY Right 2021    removed    • BREAST SURGERY      Aug.    • BRONCHOSCOPY     • CHOLECYSTECTOMY     • COLONOSCOPY     • EGD  07/2019    [facility].     • FL RETROGRADE PYELOGRAM  01/09/2020   • FL RETROGRADE PYELOGRAM  02/04/2020   • GASTROSTOMY TUBE, PLACE     • IR BALLOON-OCCLUDED ANTEGRADE TRANSVENOUS OBLITERATION (BATO)  11/22/2022   • IR PORT PLACEMENT  03/22/2022   • IR PORT REMOVAL  01/10/2022   • IR PORT STRIPPING  11/25/2022   • IR TUNNELED CENTRAL LINE PLACEMENT  01/13/2022   • LUNG SURGERY     • LUNG TRANSPLANT      X2   • UT CYSTO BLADDER W/URETERAL CATHETERIZATION Right 01/09/2020 Procedure: CYSTOSCOPY RETROGRADE PYELOGRAM WITH INSERTION STENT URETERAL;  Surgeon: Terrell Copeland MD;  Location: [facility];  Service: Urology   • CO CYSTO/URETERO W/LITHOTRIPSY &INDWELL STENT INSRT Right 2020    Procedure: CYSTOSCOPY URETEROSCOPY WITH LITHOTRIPSY HOLMIUM LASER, RETROGRADE PYELOGRAM AND INSERTION STENT URETERAL;  Surgeon: Terrell Copeland MD;  Location: [facility];  Service: Urology   • SKIN BIOPSY     • US GUIDED VASCULAR ACCESS  2017      Family History   Problem Relation Age of Onset   • Alcohol abuse Mother    • Mental illness Mother    • Depression Mother    • Alcohol abuse Father    • COPD Maternal Grandmother    • Lung cancer Maternal Grandmother 72   • Cancer Maternal Grandmother    • COPD Maternal Grandfather    • Cancer Maternal Grandfather    • COPD Paternal Grandmother    • Lung cancer Paternal Grandmother 72   • No Known Problems Sister    • No Known Problems Paternal Aunt    • Breast cancer Neg Hx    • Ovarian cancer Neg Hx    • Colon cancer Neg Hx      Social History     Tobacco Use   • Smoking status: Former     Packs/day: 0.50     Years: 12.00     Total pack years: 6.00     Types: Cigarettes     Start date: 1993     Quit date: 2005     Years since quittin.5     Passive exposure: Past   • Smokeless tobacco: Never   Substance Use Topics   • Alcohol use:  Yes     Alcohol/week: 2.0 standard drinks of alcohol     Types: 2 Glasses of wine per week     Comment: social     Allergies   Allergen Reactions   • Bebtelovimab Anaphylaxis   • Vancomycin Angioedema and Hives   • Gabapentin Hallucinations   • Ceftazidime Headache     Severe headaches after desensitization   • Nsaids Other (See Comments)     Lung transplant increases risk of renal toxicity, call lung txp provider to discuss if needed         Current Outpatient Medications:   •  acetaminophen (TYLENOL) 325 mg tablet, Take 3 tablets (975 mg total) by mouth every 8 (eight) hours, Disp: , Rfl: 0  • albuterol (2.5 mg/3 mL) 0.083 % nebulizer solution, Take 6 mL (5 mg total) by nebulization 2 (two) times a day, Disp: , Rfl: 0  •  ALPRAZolam (XANAX) 1 mg tablet, Take 1 tablet (1 mg total) by mouth daily at bedtime as needed for anxiety or sleep, Disp: 30 tablet, Rfl: 0  •  amoxicillin (AMOXIL) 500 mg capsule, , Disp: , Rfl:   •  apixaban (ELIQUIS) 5 mg, Take 1 tablet (5 mg total) by mouth 2 (two) times a day, Disp: 30 tablet, Rfl: 0  •  atovaquone (MEPRON) 750 mg/5 mL suspension, Take 1,500 mg by mouth daily, Disp: , Rfl:   •  azithromycin (ZITHROMAX) 250 mg tablet, Take 250 mg by mouth 3 (three) times a week, Disp: , Rfl:   •  BD Pen Needle Abi U/F 32G X 4 MM MISC, 4 times daily, Disp: 400 each, Rfl: 1  •  bisacodyl (DULCOLAX) 5 mg EC tablet, Take 1 tablet (5 mg total) by mouth daily at bedtime, Disp: 30 tablet, Rfl: 0  •  Cholecalciferol (Vitamin D3) 125 MCG (5000 UT) TABS, Take 5,000 Units by mouth daily, Disp: , Rfl:   •  Continuous Blood Gluc  (Dexcom G6 ) SUKHI, Use 1 Units continuous, Disp: 1 each, Rfl: 0  •  Continuous Blood Gluc Sensor (Dexcom G6 Sensor) MISC, Use 1 Units every 7 days, Disp: 12 each, Rfl: 0  •  dexlansoprazole (DEXILANT) 60 MG capsule, Take 1 capsule (60 mg total) by mouth in the morning., Disp: 30 capsule, Rfl: 0  •  dronabinol (MARINOL) 5 MG capsule, TAKE 1 CAPSULE (5 MG TOTAL) BY MOUTH 3 (THREE) TIMES A DAY, Disp: 90 capsule, Rfl: 3  •  insulin aspart (NovoLOG FlexPen) 100 UNIT/ML injection pen, Inject 4 units three times a day with meals, Disp: 15 mL, Rfl: 3  •  Insulin Pen Needle (Comfort EZ Pen Needles) 33G X 4 MM MISC, Use to inject insulin 4 times a day, Disp: 100 each, Rfl: 3  •  naloxone (NARCAN) 4 mg/0.1 mL nasal spray, 4 mg into each nostril, Disp: , Rfl:   •  oxyCODONE (ROXICODONE) 10 MG TABS, Take 1 tablet (10 mg total) by mouth every 8 (eight) hours as needed for severe pain Max Daily Amount: 30 mg, Disp: 90 tablet, Rfl: 0  •  pancrelipase, Lip-Prot-Amyl, (CREON) 24,000 units, Take 48,000 Units by mouth 3 (three) times a day with meals, Disp: , Rfl:   •  polyethylene glycol (MIRALAX) 17 g packet, Take 17 g by mouth 2 (two) times a day, Disp: , Rfl: 0  •  predniSONE 10 mg tablet, Take 10 mg by mouth daily, Disp: , Rfl:   •  pregabalin (LYRICA) 150 mg capsule, Take 1 capsule (150 mg total) by mouth 2 (two) times a day, Disp: 180 capsule, Rfl: 1  •  Respiratory Therapy Supplies (Nebulizer) SUKHI, Please dispense nebulizer machine, Disp: , Rfl:   •  Semglee, yfgn, 100 UNIT/ML SOPN, Inject 0.12 mL (12 Units total) as directed daily at bedtime 12 units hs, Disp: 3 mL, Rfl: 3  •  tacrolimus (PROGRAF) 0.5 mg capsule, , Disp: , Rfl:   •  tacrolimus (PROGRAF) 1 mg capsule, Take 1 mg by mouth 2 (two) times a day Take 2 tablets and a 0.5 table to total 2.5 mg twice a day 5mg total daily, Disp: , Rfl:   •  valACYclovir (VALTREX) 500 mg tablet, Take 500 mg by mouth daily, Disp: , Rfl:   •  venlafaxine (EFFEXOR-XR) 37.5 mg 24 hr capsule, TAKE 1 CAPSULE BY MOUTH DAILY WITH BREAKFAST., Disp: 90 capsule, Rfl: 1  •  vitamin B-12 (VITAMIN B-12) 1,000 mcg tablet, Take by mouth daily, Disp: , Rfl:     Review of Systems   HENT: Negative. Eyes: Negative. Respiratory: Negative. Cardiovascular: Negative. Gastrointestinal: Negative. Endocrine: Negative. Musculoskeletal: Negative. Skin: Negative. Allergic/Immunologic: Negative. Neurological: Negative. Hematological: Negative. Psychiatric/Behavioral: Negative. Physical Exam:  There is no height or weight on file to calculate BMI. There were no vitals taken for this visit. There were no vitals filed for this visit. Physical Exam  Constitutional:       General: She is not in acute distress. Appearance: She is well-developed. She is not ill-appearing. HENT:      Head: Normocephalic and atraumatic. Nose: Nose normal.      Mouth/Throat:      Pharynx: Oropharynx is clear.    Eyes: Extraocular Movements: Extraocular movements intact. Conjunctiva/sclera: Conjunctivae normal.   Neck:      Thyroid: No thyromegaly. Cardiovascular:      Rate and Rhythm: Normal rate. Pulmonary:      Effort: Pulmonary effort is normal.   Musculoskeletal:         General: No deformity. Cervical back: Normal range of motion. Skin:     Capillary Refill: Capillary refill takes less than 2 seconds. Coloration: Skin is not pale. Findings: No rash. Neurological:      Mental Status: She is alert and oriented to person, place, and time. Psychiatric:         Behavior: Behavior normal.         Labs:   Lab Results   Component Value Date    HGBA1C 8.5 02/17/2023       No results found for: "VTR1PHFQRMMQ", "TSH", "M7YRIQK", "Z1HNKDP", "THYROIDAB"    Lab Results   Component Value Date    CREATININE 1.01 05/07/2023    CREATININE 0.97 05/07/2023    CREATININE 0.94 05/06/2023    BUN 29 (H) 05/07/2023    K 3.9 05/07/2023     05/07/2023    CO2 26 05/07/2023     eGFR   Date Value Ref Range Status   05/07/2023 67 ml/min/1.73sq m Final       Lab Results   Component Value Date    ALT 18 11/08/2022    AST 32 11/08/2022    ALKPHOS 145 (H) 11/08/2022       No results found for: "CHOLESTEROL"  No results found for: "HDL"  No results found for: "TRIG"  No results found for: "NONHDLC"      Impression:  1. Type 1 diabetes mellitus with diabetic chronic kidney disease, unspecified CKD stage ([address])    2. Pancreatic insufficiency    3. Gastroparesis    4. Hypercholesteremia         Plan:    Diagnoses and all orders for this visit:    Type 1 diabetes mellitus with diabetic chronic kidney disease, unspecified CKD stage ([address]). His last visit weight is essentially stable. She does not report any symptoms of hypoglycemia or hyperglycemia. She does have difficulty using her Dexcom G6 due to connectivity issues between the sensor and transmitter.     Today we discussed options and agreed to continue her current insulin regimen Glargine 12u QHS and Humalog 5u - 7u - 8u plus 1 unit /50 mg/dL above 150 mg/dL. We agreed to switch to [facility] G7 sensor as she does not have any plans for an insulin pump in the near future. A sample Dexcom G7 sensor was provided and I advised her to use with Dexcom lulu as well as Dexcom clarity lulu. Send data in 2 weeks. Follow-up in 3 months.    -     Continuous Blood Gluc  (Dexcom G7 ) SUKHI; Use 1 each continuous  -     Continuous Blood Gluc Sensor (Dexcom G7 Sensor); Use 1 Device every 10 days  -     Hemoglobin A1C; Future  -     Albumin / creatinine urine ratio; Future    Pancreatic insufficiency. She has exogenous pancreatic insufficiency and uses Creon. Note background cystic fibrosis. Gastroparesis. This is significantly improved since improved glycemia. Hypercholesteremia        I have spent 32 minutes with patient today in which greater than 50% of this time was spent in counseling/coordination of care. Discussed with the patient and all questioned fully answered. She will call me if any problems arise. Educated/ Counseled patient on diagnostic test results, prognosis, risk vs benefit of treatment options, importance of treatment compliance, healthy life and lifestyle choices.       [facility]

## 2023-08-02 ENCOUNTER — TELEPHONE (OUTPATIENT)
Dept: GASTROENTEROLOGY | Facility: CLINIC | Age: 48
End: 2023-08-02

## 2023-08-02 NOTE — TELEPHONE ENCOUNTER
----- Message from Sofia Diaz LPN sent at 7/54/1193 11:12 AM EDT -----  I left message per consent. Patient aware and educated. Colon recall entered 3 yrs. Egd recall entered 1 yr. Please addend egd report recommendations to include egd recall. Please call to schedule f/u appt.  Thank you

## 2023-08-03 ENCOUNTER — TELEPHONE (OUTPATIENT)
Dept: GASTROENTEROLOGY | Facility: AMBULARY SURGERY CENTER | Age: 48
End: 2023-08-03

## 2023-08-03 NOTE — TELEPHONE ENCOUNTER
Tried to reach pt to schedule a F/U OV with Dr Bobbi Graf.  No response left V/m to call back-thank  you

## 2023-08-03 NOTE — TELEPHONE ENCOUNTER
----- Message from Shari Abreu sent at 8/3/2023 11:26 AM EDT -----  Regarding: FW: Appt set up   Contact: [phone]    ----- Message -----  From: Arthurmike Overton  Sent: 8/3/2023  10:12 AM EDT  To: [facility]  Subject: Appt set up                                      Hello,     On July 19th I received a voicemail going over my endoscopy/colonoscopy test results and the voicemail stated that somebody would call me shortly to set up an appointment as Dr. Moni Alvarado would like to see me in the office. I have heard nothing yet so please get back to me as soon as possible so that we can get this taken care of.      Thank you,   DAYNA Jenkins

## 2023-08-04 ENCOUNTER — TELEPHONE (OUTPATIENT)
Dept: GASTROENTEROLOGY | Facility: CLINIC | Age: 48
End: 2023-08-04

## 2023-08-04 NOTE — TELEPHONE ENCOUNTER
----- Message from Ashley Titus sent at 8/3/2023 11:26 AM EDT -----  Regarding: FW: Appt set up   Contact: [phone]    ----- Message -----  From: Isabel Ferguson  Sent: 8/3/2023  10:12 AM EDT  To: [facility]  Subject: Appt set up                                      Hello,     On July 19th I received a voicemail going over my endoscopy/colonoscopy test results and the voicemail stated that somebody would call me shortly to set up an appointment as Dr. Tri Canada would like to see me in the office. I have heard nothing yet so please get back to me as soon as possible so that we can get this taken care of.      Thank you,   DAYNA Jenkins

## 2023-08-10 RX ORDER — DEXLANSOPRAZOLE 60 MG/1
CAPSULE, DELAYED RELEASE ORAL
Qty: 30 CAPSULE | Refills: 0 | Status: SHIPPED | OUTPATIENT
Start: 2023-08-10

## 2023-08-18 ENCOUNTER — TELEPHONE (OUTPATIENT)
Dept: DERMATOLOGY | Facility: CLINIC | Age: 48
End: 2023-08-18

## 2023-08-18 NOTE — TELEPHONE ENCOUNTER
Called patient to schedule excision for malignant BCC of chest, per Dr. Jenae Brannon. Left a message for patient to call the office to schedule.

## 2023-08-22 ENCOUNTER — RA CDI HCC (OUTPATIENT)
Dept: OTHER | Facility: HOSPITAL | Age: 48
End: 2023-08-22

## 2023-08-22 NOTE — PROGRESS NOTES
[facility] coding opportunities       Chart reviewed, no opportunity found: CHART REVIEWED, NO OPPORTUNITY FOUND        Patients Insurance        Commercial Insurance: Nolan Oliva

## 2023-08-24 DIAGNOSIS — E84.9 CYSTIC FIBROSIS (HCC): ICD-10-CM

## 2023-08-24 RX ORDER — OXYCODONE HYDROCHLORIDE 10 MG/1
10 TABLET ORAL EVERY 8 HOURS PRN
Qty: 90 TABLET | Refills: 0 | Status: SHIPPED | OUTPATIENT
Start: 2023-08-24

## 2023-08-24 RX ORDER — ALPRAZOLAM 1 MG/1
1 TABLET ORAL
Qty: 30 TABLET | Refills: 0 | Status: SHIPPED | OUTPATIENT
Start: 2023-08-24

## 2023-08-28 ENCOUNTER — TELEMEDICINE (OUTPATIENT)
Dept: INTERNAL MEDICINE CLINIC | Facility: CLINIC | Age: 48
End: 2023-08-28
Payer: MEDICARE

## 2023-08-28 DIAGNOSIS — E84.9 CYSTIC FIBROSIS (HCC): ICD-10-CM

## 2023-08-28 DIAGNOSIS — R06.09 DYSPNEA ON EXERTION: Primary | ICD-10-CM

## 2023-08-28 PROCEDURE — 99214 OFFICE O/P EST MOD 30 MIN: CPT | Performed by: INTERNAL MEDICINE

## 2023-08-28 RX ORDER — HYDROCODONE POLISTIREX AND CHLORPHENIRAMINE POLISTIREX 10; 8 MG/5ML; MG/5ML
5 SUSPENSION, EXTENDED RELEASE ORAL EVERY 12 HOURS PRN
Qty: 70 ML | Refills: 0 | Status: SHIPPED | OUTPATIENT
Start: 2023-08-28

## 2023-08-28 RX ORDER — LEVOFLOXACIN 750 MG/1
750 TABLET ORAL EVERY 24 HOURS
Qty: 7 TABLET | Refills: 0 | Status: SHIPPED | OUTPATIENT
Start: 2023-08-28 | End: 2023-09-04

## 2023-08-28 RX ORDER — SODIUM CHLORIDE FOR INHALATION 3 %
4 VIAL, NEBULIZER (ML) INHALATION EVERY 8 HOURS PRN
Qty: 60 ML | Refills: 0 | Status: SHIPPED | OUTPATIENT
Start: 2023-08-28 | End: 2023-09-04

## 2023-08-28 NOTE — ASSESSMENT & PLAN NOTE
· x1 day, more SOB with exertion than baseline.  +cough more productive than baseline. +fatigue. No fevers though immunosuppressed, + lung transplant pt, + CF. Saturations ranging mid 90s at rest to high 88-90 with ambulation/exertion. · DDx community acquired pneumonia vs URI vs covid. · Given her risk factors, I would empirically treat for possible CAP, will give Rx for levofloxacin 750 mg daily. Patient is maintained on tacrolimus though QTc has not been prolonged on prior EKGS with interval <450 msec. · Trial of 3% NaCL nebs TID prn, can alternate with albuterol nebs that she already has if needed  · Chronically on Mucinex, continue this. · Will take COVID Ag home test and notify office of results. · If sx not improving over next 48 hours or so or getting worse, she will go for CXR that I ordered this afternoon. She would like to hold off on getting this done right now as she does not have transportation. We discussed going to the ED if she develops worsening sx or persistent hypoxia; she has pulse oximeter at home to monitor this. · Tylenol 500-1000 mg q6-8 hours, respectively for pain and fever. · Tussionex 10-8 mg/5 ml ER q12 prn for persistent cough, not to be taken concurrently with her oxycodone due to risk for respiratory depression. · Will schedule virtual follow-up in 2 days.

## 2023-08-28 NOTE — PROGRESS NOTES
Virtual Regular Visit    Verification of patient location:    Patient is located at Home in the following state in which I hold an active license [place]      Assessment/Plan:    Problem List Items Addressed This Visit        Respiratory    Cystic fibrosis (HCC)    Relevant Medications    levofloxacin (LEVAQUIN) 750 mg tablet    sodium chloride 3 % inhalation solution    Hydrocod Edgar-Chlorphe Edgar ER (TUSSIONEX) 10-8 mg/5 mL ER suspension    Other Relevant Orders    XR chest pa & lateral       Other    Dyspnea on exertion - Primary     x1 day, more SOB with exertion than baseline.  +cough more productive than baseline. +fatigue. No fevers though immunosuppressed, + lung transplant pt, + CF. Saturations ranging mid 90s at rest to high 88-90 with ambulation/exertion. DDx community acquired pneumonia vs URI vs covid. Given her risk factors, I would empirically treat for possible CAP, will give Rx for levofloxacin 750 mg daily. Patient is maintained on tacrolimus though QTc has not been prolonged on prior EKGS with interval <450 msec. Trial of 3% NaCL nebs TID prn, can alternate with albuterol nebs that she already has if needed  Chronically on Mucinex, continue this. Will take COVID Ag home test and notify office of results. If sx not improving over next 48 hours or so or getting worse, she will go for CXR that I ordered this afternoon. She would like to hold off on getting this done right now as she does not have transportation. We discussed going to the ED if she develops worsening sx or persistent hypoxia; she has pulse oximeter at home to monitor this. Tylenol 500-1000 mg q6-8 hours, respectively for pain and fever. Tussionex 10-8 mg/5 ml ER q12 prn for persistent cough, not to be taken concurrently with her oxycodone due to risk for respiratory depression. Will schedule virtual follow-up in 2 days.          Relevant Medications    levofloxacin (LEVAQUIN) 750 mg tablet    Other Relevant Orders    XR chest pa & lateral            Reason for visit is   Chief Complaint   Patient presents with   • Virtual Regular Visit        Encounter provider Rayne Ahumada, MD    Provider located at [address] [facility]  [phone]      Recent Visits  No visits were found meeting these conditions. Showing recent visits within past 7 days and meeting all other requirements  Today's Visits  Date Type Provider Dept   08/28/23 Telemedicine Rayne Ahumada, MD  Internal Med [facility] [place]   Showing today's visits and meeting all other requirements  Future Appointments  No visits were found meeting these conditions. Showing future appointments within next 150 days and meeting all other requirements       The patient was identified by name and date of birth. Isha Simon was informed that this is a telemedicine visit and that the visit is being conducted through the AppTrigger. She agrees to proceed. .  My office door was closed. No one else was in the room. She acknowledged consent and understanding of privacy and security of the video platform. The patient has agreed to participate and understands they can discontinue the visit at any time. Patient is aware this is a billable service. Michelle Delgado is a 41 y.o. female with underlying CF, s/p lung transplant, chronic immunosuppression with tacrolimus, with complaints of x1 day of L-sided pleuritic CP and SOB with exertion. She has baseline chronic cough, though notes more productive than usual.  Unclear if any change in consistency or color. Afebrile. No known sick contacts though has been around a lot of people lately outside of her immediate household. No known COVID contacts recently. She is maintained on azithro and atovaquone for prophylaxis to PCP/MAC.         Past Medical History:   Diagnosis Date   • ABPA (allergic bronchopulmonary aspergillosis) ([address]) 07/08/2012   • Acute kidney injury ([address]) 09/08/2017   • Acute on chronic respiratory failure ([address]) 03/04/2017    Last Assessment & Plan:  Formatting of this note might be different from the original. Recurrent with clinical worsening with enlarging PTX, s/p multiple chest tubes, but new fevers 6/19 and worsening leukocytosis indicating infection as cause of 6/19 decompensation with profound hypoxia in setting of shunting through right lung and continued pneumothorax s/p 2nd chest tube placement. 6/27: Right   • Anaphylaxis 06/06/2022   • Anemia    • Asthma    • Chest pain 09/21/2021   • Chronic pain    • Chronic respiratory failure with hypoxia ([address]) 09/07/2021   • COVID-19 06/03/2022   • Cystic fibrosis ([address])    • Deep vein thrombosis (HCC)    • Diabetes mellitus ([address])    • GERD (gastroesophageal reflux disease)    • HBP (high blood pressure)    • HCAP (healthcare-associated pneumonia) 03/06/2020   • History of transfusion    • Influenza B 02/23/2020   • Kidney stone    • Neutropenic fever ([address]) 09/26/2020   • Pneumonia due to Pseudomonas species ([address]) 10/28/2010    Last Assessment & Plan:  Formatting of this note might be different from the original. Polymicrobial pna w/ pseudomonas, M.  Abscessus, Cadida glabrata/albicans   • PONV (postoperative nausea and vomiting)    • Pseudomonas aeruginosa infection 09/20/2012   • Squamous cell skin cancer 01/12/2023    SCCIS- Left upper thigh   • Transplant recipient     Lung- 2013, 2017   • Ulcerative colitis [facility])    • Wellness examination 02/04/2013    Formatting of this note might be different from the original. Discussed with patient and acknowledgement form signed on 2/4/2013 (ALYSHA)       Past Surgical History:   Procedure Laterality Date   • APPENDECTOMY     • AUGMENTATION MAMMAPLASTY Bilateral 2004   • AUGMENTATION MAMMAPLASTY Right 2021    removed    • BREAST SURGERY      Aug.    • BRONCHOSCOPY     • CHOLECYSTECTOMY     • COLONOSCOPY     • EGD  07/2019    [facility]     • FL RETROGRADE PYELOGRAM  01/09/2020   • FL RETROGRADE PYELOGRAM  02/04/2020   • GASTROSTOMY TUBE, PLACE     • IR BALLOON-OCCLUDED ANTEGRADE TRANSVENOUS OBLITERATION (BATO)  11/22/2022   • IR PORT PLACEMENT  03/22/2022   • IR PORT REMOVAL  01/10/2022   • IR PORT STRIPPING  11/25/2022   • IR TUNNELED CENTRAL LINE PLACEMENT  01/13/2022   • LUNG SURGERY     • LUNG TRANSPLANT      X2   • NJ CYSTO BLADDER W/URETERAL CATHETERIZATION Right 01/09/2020    Procedure: CYSTOSCOPY RETROGRADE PYELOGRAM WITH INSERTION STENT URETERAL;  Surgeon: Mildred Sawyer MD;  Location: [facility];  Service: Urology   • NJ CYSTO/URETERO W/LITHOTRIPSY &INDWELL STENT INSRT Right 02/04/2020    Procedure: CYSTOSCOPY URETEROSCOPY WITH LITHOTRIPSY HOLMIUM LASER, RETROGRADE PYELOGRAM AND INSERTION STENT URETERAL;  Surgeon: Mildred Sawyer MD;  Location: [facility];  Service: Urology   • SKIN BIOPSY     • US GUIDED VASCULAR ACCESS  01/17/2017       Current Outpatient Medications   Medication Sig Dispense Refill   • Hydrocod Edgar-Chlorphe Edgar ER (TUSSIONEX) 10-8 mg/5 mL ER suspension Take 5 mL by mouth every 12 (twelve) hours as needed for cough Max Daily Amount: 10 mL 70 mL 0   • levofloxacin (LEVAQUIN) 750 mg tablet Take 1 tablet (750 mg total) by mouth every 24 hours for 7 days 7 tablet 0   • sodium chloride 3 % inhalation solution Take 4 mL by nebulization every 8 (eight) hours as needed for cough for up to 7 days 60 mL 0   • acetaminophen (TYLENOL) 325 mg tablet Take 3 tablets (975 mg total) by mouth every 8 (eight) hours  0   • albuterol (2.5 mg/3 mL) 0.083 % nebulizer solution Take 6 mL (5 mg total) by nebulization 2 (two) times a day  0   • ALPRAZolam (XANAX) 1 mg tablet Take 1 tablet (1 mg total) by mouth daily at bedtime as needed for anxiety or sleep 30 tablet 0   • apixaban (ELIQUIS) 5 mg Take 1 tablet (5 mg total) by mouth 2 (two) times a day 30 tablet 0   • atovaquone (MEPRON) 750 mg/5 mL suspension Take 1,500 mg by mouth daily     • azithromycin (ZITHROMAX) 250 mg tablet Take 250 mg by mouth 3 (three) times a week     • BD Pen Needle Abi U/F 32G X 4 MM MISC 4 times daily 400 each 1   • bisacodyl (DULCOLAX) 5 mg EC tablet Take 1 tablet (5 mg total) by mouth daily at bedtime 30 tablet 0   • Cholecalciferol (Vitamin D3) 125 MCG (5000 UT) TABS Take 5,000 Units by mouth daily     • Continuous Blood Gluc  (Dexcom G7 ) SUKHI Use 1 each continuous 1 each 0   • Continuous Blood Gluc Sensor (Dexcom G7 Sensor) Use 1 Device every 10 days 3 each 5   • dexlansoprazole (DEXILANT) 60 MG capsule TAKE 1 CAPSULE EVERY MORNING 30 capsule 0   • dronabinol (MARINOL) 5 MG capsule TAKE 1 CAPSULE (5 MG TOTAL) BY MOUTH 3 (THREE) TIMES A DAY 90 capsule 3   • insulin aspart (NovoLOG FlexPen) 100 UNIT/ML injection pen Inject 4 units three times a day with meals 15 mL 3   • Insulin Pen Needle (Comfort EZ Pen Needles) 33G X 4 MM MISC Use to inject insulin 4 times a day 100 each 3   • naloxone (NARCAN) 4 mg/0.1 mL nasal spray 4 mg into each nostril     • oxyCODONE (ROXICODONE) 10 MG TABS Take 1 tablet (10 mg total) by mouth every 8 (eight) hours as needed for severe pain Max Daily Amount: 30 mg 90 tablet 0   • pancrelipase, Lip-Prot-Amyl, (CREON) 24,000 units Take 48,000 Units by mouth 3 (three) times a day with meals     • polyethylene glycol (MIRALAX) 17 g packet Take 17 g by mouth 2 (two) times a day  0   • predniSONE 10 mg tablet Take 10 mg by mouth daily     • pregabalin (LYRICA) 150 mg capsule Take 1 capsule (150 mg total) by mouth 2 (two) times a day 180 capsule 1   • Respiratory Therapy Supplies (Nebulizer) SUKHI Please dispense nebulizer machine     • Semglee, yfgn, 100 UNIT/ML SOPN Inject 0.12 mL (12 Units total) as directed daily at bedtime 12 units hs 3 mL 3   • tacrolimus (PROGRAF) 0.5 mg capsule      • tacrolimus (PROGRAF) 1 mg capsule Take 1 mg by mouth 2 (two) times a day Take 2 tablets and a 0.5 table to total 2.5 mg twice a day 5mg total daily     • valACYclovir (VALTREX) 500 mg tablet Take 500 mg by mouth daily     • vitamin B-12 (VITAMIN B-12) 1,000 mcg tablet Take by mouth daily       No current facility-administered medications for this visit. Allergies   Allergen Reactions   • Bebtelovimab Anaphylaxis   • Vancomycin Angioedema and Hives   • Gabapentin Hallucinations   • Ceftazidime Headache     Severe headaches after desensitization   • Nsaids Other (See Comments)     Lung transplant increases risk of renal toxicity, call lung txp provider to discuss if needed       Review of Systems   Constitutional: Positive for activity change, chills and fatigue. Negative for fever. HENT: Positive for voice change. Negative for congestion, rhinorrhea, sinus pressure and sore throat. Respiratory: Positive for cough, chest tightness and shortness of breath. Negative for wheezing. Cardiovascular: Positive for chest pain. Gastrointestinal: Negative for diarrhea and nausea. Psychiatric/Behavioral: Positive for decreased concentration. The patient is nervous/anxious. Video Exam    There were no vitals filed for this visit. Physical Exam  Vitals reviewed: Fatigued. Constitutional:       General: She is not in acute distress. Appearance: She is ill-appearing. She is not toxic-appearing.    Pulmonary:      Comments: Coughing         Visit Time  Total Visit Duration: 25

## 2023-08-30 ENCOUNTER — APPOINTMENT (EMERGENCY)
Dept: CT IMAGING | Facility: HOSPITAL | Age: 48
End: 2023-08-30
Payer: COMMERCIAL

## 2023-08-30 ENCOUNTER — TELEMEDICINE (OUTPATIENT)
Dept: INTERNAL MEDICINE CLINIC | Facility: CLINIC | Age: 48
End: 2023-08-30

## 2023-08-30 ENCOUNTER — APPOINTMENT (EMERGENCY)
Dept: RADIOLOGY | Facility: HOSPITAL | Age: 48
End: 2023-08-30
Payer: COMMERCIAL

## 2023-08-30 ENCOUNTER — HOSPITAL ENCOUNTER (EMERGENCY)
Facility: HOSPITAL | Age: 48
Discharge: HOME | End: 2023-08-30
Attending: EMERGENCY MEDICINE
Payer: COMMERCIAL

## 2023-08-30 VITALS
OXYGEN SATURATION: 97 % | TEMPERATURE: 99 F | HEART RATE: 83 BPM | SYSTOLIC BLOOD PRESSURE: 158 MMHG | RESPIRATION RATE: 24 BRPM | DIASTOLIC BLOOD PRESSURE: 83 MMHG

## 2023-08-30 DIAGNOSIS — R06.09 DYSPNEA ON EXERTION: Primary | ICD-10-CM

## 2023-08-30 DIAGNOSIS — R07.9 CHEST PAIN: Primary | ICD-10-CM

## 2023-08-30 LAB
2HR DELTA HS TROPONIN: 0 NG/L
ALBUMIN SERPL BCP-MCNC: 4 G/DL (ref 3.5–5)
ALP SERPL-CCNC: 78 U/L (ref 34–104)
ALT SERPL W P-5'-P-CCNC: 17 U/L (ref 7–52)
ANION GAP SERPL CALCULATED.3IONS-SCNC: 7 MMOL/L
APTT PPP: 32 SECONDS (ref 23–37)
AST SERPL W P-5'-P-CCNC: 17 U/L (ref 13–39)
ATRIAL RATE: 89 BPM
BASOPHILS # BLD AUTO: 0.02 THOUSANDS/ÂΜL (ref 0–0.1)
BASOPHILS NFR BLD AUTO: 0 % (ref 0–1)
BILIRUB SERPL-MCNC: 0.28 MG/DL (ref 0.2–1)
BNP SERPL-MCNC: 380 PG/ML (ref 0–100)
BUN SERPL-MCNC: 15 MG/DL (ref 5–25)
CALCIUM SERPL-MCNC: 9.9 MG/DL (ref 8.4–10.2)
CARDIAC TROPONIN I PNL SERPL HS: 4 NG/L
CARDIAC TROPONIN I PNL SERPL HS: 4 NG/L
CHLORIDE SERPL-SCNC: 99 MMOL/L (ref 96–108)
CO2 SERPL-SCNC: 26 MMOL/L (ref 21–32)
CREAT SERPL-MCNC: 1.15 MG/DL (ref 0.6–1.3)
EOSINOPHIL # BLD AUTO: 0.06 THOUSAND/ÂΜL (ref 0–0.61)
EOSINOPHIL NFR BLD AUTO: 1 % (ref 0–6)
ERYTHROCYTE [DISTWIDTH] IN BLOOD BY AUTOMATED COUNT: 17.5 % (ref 11.6–15.1)
FLUAV RNA RESP QL NAA+PROBE: NEGATIVE
FLUBV RNA RESP QL NAA+PROBE: NEGATIVE
GFR SERPL CREATININE-BSD FRML MDRD: 57 ML/MIN/1.73SQ M
GLUCOSE SERPL-MCNC: 236 MG/DL (ref 65–140)
HCG SERPL QL: NEGATIVE
HCT VFR BLD AUTO: 32.1 % (ref 34.8–46.1)
HGB BLD-MCNC: 9.3 G/DL (ref 11.5–15.4)
IMM GRANULOCYTES # BLD AUTO: 0.02 THOUSAND/UL (ref 0–0.2)
IMM GRANULOCYTES NFR BLD AUTO: 0 % (ref 0–2)
INR PPP: 1.23 (ref 0.84–1.19)
LACTATE SERPL-SCNC: 1.9 MMOL/L (ref 0.5–2)
LYMPHOCYTES # BLD AUTO: 1.15 THOUSANDS/ÂΜL (ref 0.6–4.47)
LYMPHOCYTES NFR BLD AUTO: 13 % (ref 14–44)
MCH RBC QN AUTO: 22 PG (ref 26.8–34.3)
MCHC RBC AUTO-ENTMCNC: 29 G/DL (ref 31.4–37.4)
MCV RBC AUTO: 76 FL (ref 82–98)
MONOCYTES # BLD AUTO: 0.45 THOUSAND/ÂΜL (ref 0.17–1.22)
MONOCYTES NFR BLD AUTO: 5 % (ref 4–12)
NEUTROPHILS # BLD AUTO: 7.25 THOUSANDS/ÂΜL (ref 1.85–7.62)
NEUTS SEG NFR BLD AUTO: 81 % (ref 43–75)
NRBC BLD AUTO-RTO: 0 /100 WBCS
P AXIS: 61 DEGREES
PLATELET # BLD AUTO: 169 THOUSANDS/UL (ref 149–390)
PMV BLD AUTO: 10.3 FL (ref 8.9–12.7)
POTASSIUM SERPL-SCNC: 4.8 MMOL/L (ref 3.5–5.3)
PR INTERVAL: 146 MS
PROT SERPL-MCNC: 7.7 G/DL (ref 6.4–8.4)
PROTHROMBIN TIME: 16.1 SECONDS (ref 11.6–14.5)
QRS AXIS: 52 DEGREES
QRSD INTERVAL: 72 MS
QT INTERVAL: 348 MS
QTC INTERVAL: 423 MS
RBC # BLD AUTO: 4.23 MILLION/UL (ref 3.81–5.12)
RSV RNA RESP QL NAA+PROBE: NEGATIVE
SARS-COV-2 RNA RESP QL NAA+PROBE: NEGATIVE
SODIUM SERPL-SCNC: 132 MMOL/L (ref 135–147)
T WAVE AXIS: 49 DEGREES
VENTRICULAR RATE: 89 BPM
WBC # BLD AUTO: 8.95 THOUSAND/UL (ref 4.31–10.16)

## 2023-08-30 PROCEDURE — 84703 CHORIONIC GONADOTROPIN ASSAY: CPT | Performed by: EMERGENCY MEDICINE

## 2023-08-30 PROCEDURE — 96374 THER/PROPH/DIAG INJ IV PUSH: CPT

## 2023-08-30 PROCEDURE — 96375 TX/PRO/DX INJ NEW DRUG ADDON: CPT

## 2023-08-30 PROCEDURE — 83880 ASSAY OF NATRIURETIC PEPTIDE: CPT | Performed by: EMERGENCY MEDICINE

## 2023-08-30 PROCEDURE — 0241U HB NFCT DS VIR RESP RNA 4 TRGT: CPT | Performed by: EMERGENCY MEDICINE

## 2023-08-30 PROCEDURE — 87040 BLOOD CULTURE FOR BACTERIA: CPT | Performed by: EMERGENCY MEDICINE

## 2023-08-30 PROCEDURE — 99285 EMERGENCY DEPT VISIT HI MDM: CPT | Performed by: EMERGENCY MEDICINE

## 2023-08-30 PROCEDURE — 84484 ASSAY OF TROPONIN QUANT: CPT | Performed by: EMERGENCY MEDICINE

## 2023-08-30 PROCEDURE — 94644 CONT INHLJ TX 1ST HOUR: CPT

## 2023-08-30 PROCEDURE — 36415 COLL VENOUS BLD VENIPUNCTURE: CPT | Performed by: EMERGENCY MEDICINE

## 2023-08-30 PROCEDURE — 85610 PROTHROMBIN TIME: CPT | Performed by: EMERGENCY MEDICINE

## 2023-08-30 PROCEDURE — 85730 THROMBOPLASTIN TIME PARTIAL: CPT | Performed by: EMERGENCY MEDICINE

## 2023-08-30 PROCEDURE — 71275 CT ANGIOGRAPHY CHEST: CPT

## 2023-08-30 PROCEDURE — 93005 ELECTROCARDIOGRAM TRACING: CPT

## 2023-08-30 PROCEDURE — 83605 ASSAY OF LACTIC ACID: CPT | Performed by: EMERGENCY MEDICINE

## 2023-08-30 PROCEDURE — 96361 HYDRATE IV INFUSION ADD-ON: CPT

## 2023-08-30 PROCEDURE — 85025 COMPLETE CBC W/AUTO DIFF WBC: CPT | Performed by: EMERGENCY MEDICINE

## 2023-08-30 PROCEDURE — 71045 X-RAY EXAM CHEST 1 VIEW: CPT

## 2023-08-30 PROCEDURE — 99285 EMERGENCY DEPT VISIT HI MDM: CPT

## 2023-08-30 PROCEDURE — 80053 COMPREHEN METABOLIC PANEL: CPT | Performed by: EMERGENCY MEDICINE

## 2023-08-30 PROCEDURE — G1004 CDSM NDSC: HCPCS

## 2023-08-30 PROCEDURE — 94760 N-INVAS EAR/PLS OXIMETRY 1: CPT

## 2023-08-30 PROCEDURE — 93010 ELECTROCARDIOGRAM REPORT: CPT | Performed by: INTERNAL MEDICINE

## 2023-08-30 RX ORDER — MORPHINE SULFATE 4 MG/ML
4 INJECTION, SOLUTION INTRAMUSCULAR; INTRAVENOUS ONCE
Status: COMPLETED | OUTPATIENT
Start: 2023-08-30 | End: 2023-08-30

## 2023-08-30 RX ORDER — HYDROMORPHONE HCL/PF 1 MG/ML
0.5 SYRINGE (ML) INJECTION ONCE
Status: COMPLETED | OUTPATIENT
Start: 2023-08-30 | End: 2023-08-30

## 2023-08-30 RX ORDER — SODIUM CHLORIDE FOR INHALATION 0.9 %
3 VIAL, NEBULIZER (ML) INHALATION ONCE
Status: COMPLETED | OUTPATIENT
Start: 2023-08-30 | End: 2023-08-30

## 2023-08-30 RX ADMIN — IOHEXOL 85 ML: 350 INJECTION, SOLUTION INTRAVENOUS at 17:50

## 2023-08-30 RX ADMIN — SODIUM CHLORIDE 1000 ML: 0.9 INJECTION, SOLUTION INTRAVENOUS at 16:14

## 2023-08-30 RX ADMIN — IPRATROPIUM BROMIDE 1 MG: 0.5 SOLUTION RESPIRATORY (INHALATION) at 15:40

## 2023-08-30 RX ADMIN — ALBUTEROL SULFATE 10 MG: 2.5 SOLUTION RESPIRATORY (INHALATION) at 15:40

## 2023-08-30 RX ADMIN — MORPHINE SULFATE 4 MG: 4 INJECTION INTRAVENOUS at 16:15

## 2023-08-30 RX ADMIN — HYDROMORPHONE HYDROCHLORIDE 0.5 MG: 1 INJECTION, SOLUTION INTRAMUSCULAR; INTRAVENOUS; SUBCUTANEOUS at 18:04

## 2023-08-30 RX ADMIN — Medication 3 ML: at 15:40

## 2023-08-30 NOTE — ED NOTES
RN with ability to access patients port is at bedside for specimen collection     Maggie Colvin  08/30/23 0495

## 2023-08-30 NOTE — ASSESSMENT & PLAN NOTE
Patient now with 3+ days of SOB with exertion despite being treated with levaquin  ROS positive for persistent fatigue and productive cough, history significant for immunosuppression and lung transplantation   Respiratory symptoms are still limiting her ability to carry out her normal daily tasks  Unreliable home oximetry, widely ranging between 87% and 97%  Home covid test negative      Plan:  Given her lack of improvement despite abx therapy and immunosuppressed status, would recommend the patient be formally evaluated at the emergency department for possible admission and IV abx. Spoke with patient and she is in agreement with this plan.   Patient says she will go to the ED today

## 2023-08-30 NOTE — ED NOTES
Patient refusing IV insertion, she has a port that she wants accessed, will obtain provider order. Respiratory called for heart neb.      Arpan Colvin  08/30/23 1522

## 2023-08-30 NOTE — ED NOTES
Patient maintained an O2 sat of 98 while ambulating the hallway, gait steady and no distress noted.       Rubén Colvin  08/30/23 1713

## 2023-08-30 NOTE — PROGRESS NOTES
Virtual Brief Visit    This Visit is being completed by telephone. The Patient is located at Home and in the following state in which I hold an active license [place]    The patient was identified by name and date of birth. Jose Guadalupe Vaca was informed that this is a telemedicine visit and that the visit is being conducted through Telephone. My office door was closed. No one else was in the room. She acknowledged consent and understanding of privacy and security of the video platform. The patient has agreed to participate and understands they can discontinue the visit at any time. Patient is aware this is a billable service. Assessment/Plan:    Problem List Items Addressed This Visit        Other    Dyspnea on exertion - Primary     Patient now with 3+ days of SOB with exertion despite being treated with levaquin  ROS positive for persistent fatigue and productive cough, history significant for immunosuppression and lung transplantation   Respiratory symptoms are still limiting her ability to carry out her normal daily tasks  Unreliable home oximetry, widely ranging between 87% and 97%  Home covid test negative      Plan:  Given her lack of improvement despite abx therapy and immunosuppressed status, would recommend the patient be formally evaluated at the emergency department for possible admission and IV abx. Spoke with patient and she is in agreement with this plan. Patient says she will go to the ED today              Recent Visits  Date Type Provider Dept   08/28/23 Telemedicine Patricia Barnhart MD  Internal [facility]   Showing recent visits within past 7 days and meeting all other requirements  Today's Visits  Date Type Provider Dept   08/30/23 Telemedicine Mary Salcido MD  Internal [facility]   Showing today's visits and meeting all other requirements  Future Appointments  No visits were found meeting these conditions.   Showing future appointments within next 150 days and meeting all other requirements         Visit Time  Total Visit Duration: 20 minutes

## 2023-08-30 NOTE — ED PROVIDER NOTES
History  Chief Complaint   Patient presents with   • Chest Pain     Pt presents with c/o constant L sided CP radiating around to the back, cough and SOB that began Monday. Recommended to come to ED for eval by PCP. Pt is a 47F PMHx of DM, DVT/PE on elliquis, CF s/p lung txp on immune suppression, CKD2, HTN, pHTN, UC, p/w CP, SOB. Pt notes 2D of cough with green nonbloody sputum, exertional SOB, L CP radiating to her back which is also exertional in nature and constant. Denies fevers but has been taking tylenol for L sided HA for 2 days and is unsure if that is masking it, last dose noon today. Notes compliance with medications and is using nebs at home with minimal relief. Also notes fatigue. Denies N/V, leg swelling, dizziness, n/t, unilateral weakness, vision changes, rashes, dysuria. Prior to Admission Medications   Prescriptions Last Dose Informant Patient Reported? Taking?    ALPRAZolam (XANAX) 1 mg tablet   No No   Sig: Take 1 tablet (1 mg total) by mouth daily at bedtime as needed for anxiety or sleep   BD Pen Needle Abi U/F 32G X 4 MM MISC  Self No No   Si times daily   Cholecalciferol (Vitamin D3) 125 MCG (5000 UT) TABS  Self Yes No   Sig: Take 5,000 Units by mouth daily   Continuous Blood Gluc  (Dexcom G7 ) SUKHI   No No   Sig: Use 1 each continuous   Continuous Blood Gluc Sensor (Dexcom G7 Sensor)   No No   Sig: Use 1 Device every 10 days   Hydrocod Edgar-Chlorphe Edgar ER (TUSSIONEX) 10-8 mg/5 mL ER suspension   No No   Sig: Take 5 mL by mouth every 12 (twelve) hours as needed for cough Max Daily Amount: 10 mL   Insulin Pen Needle (Comfort EZ Pen Needles) 33G X 4 MM MISC  Self No No   Sig: Use to inject insulin 4 times a day   Respiratory Therapy Supplies (Nebulizer) SUKHI  Self Yes No   Sig: Please dispense nebulizer machine   Semglee, yfgn, 100 UNIT/ML SOPN  Self No No   Sig: Inject 0.12 mL (12 Units total) as directed daily at bedtime 12 units hs   acetaminophen (TYLENOL) 325 mg tablet  Self No No   Sig: Take 3 tablets (975 mg total) by mouth every 8 (eight) hours   albuterol (2.5 mg/3 mL) 0.083 % nebulizer solution  Self No No   Sig: Take 6 mL (5 mg total) by nebulization 2 (two) times a day   apixaban (ELIQUIS) 5 mg   No No   Sig: Take 1 tablet (5 mg total) by mouth 2 (two) times a day   atovaquone (MEPRON) 750 mg/5 mL suspension  Self Yes No   Sig: Take 1,500 mg by mouth daily   azithromycin (ZITHROMAX) 250 mg tablet  Self Yes No   Sig: Take 250 mg by mouth 3 (three) times a week   bisacodyl (DULCOLAX) 5 mg EC tablet  Self No No   Sig: Take 1 tablet (5 mg total) by mouth daily at bedtime   dexlansoprazole (DEXILANT) 60 MG capsule   No No   Sig: TAKE 1 CAPSULE EVERY MORNING   dronabinol (MARINOL) 5 MG capsule  Self No No   Sig: TAKE 1 CAPSULE (5 MG TOTAL) BY MOUTH 3 (THREE) TIMES A DAY   insulin aspart (NovoLOG FlexPen) 100 UNIT/ML injection pen  Self No No   Sig: Inject 4 units three times a day with meals   levofloxacin (LEVAQUIN) 750 mg tablet   No No   Sig: Take 1 tablet (750 mg total) by mouth every 24 hours for 7 days   naloxone (NARCAN) 4 mg/0.1 mL nasal spray  Self Yes No   Si mg into each nostril   oxyCODONE (ROXICODONE) 10 MG TABS   No No   Sig: Take 1 tablet (10 mg total) by mouth every 8 (eight) hours as needed for severe pain Max Daily Amount: 30 mg   pancrelipase, Lip-Prot-Amyl, (CREON) 24,000 units  Self Yes No   Sig: Take 48,000 Units by mouth 3 (three) times a day with meals   polyethylene glycol (MIRALAX) 17 g packet  Self No No   Sig: Take 17 g by mouth 2 (two) times a day   predniSONE 10 mg tablet  Self Yes No   Sig: Take 10 mg by mouth daily   pregabalin (LYRICA) 150 mg capsule  Self No No   Sig: Take 1 capsule (150 mg total) by mouth 2 (two) times a day   sodium chloride 3 % inhalation solution   No No   Sig: Take 4 mL by nebulization every 8 (eight) hours as needed for cough for up to 7 days   tacrolimus (PROGRAF) 0.5 mg capsule  Self Yes No tacrolimus (PROGRAF) 1 mg capsule  Self Yes No   Sig: Take 1 mg by mouth 2 (two) times a day Take 2 tablets and a 0.5 table to total 2.5 mg twice a day 5mg total daily   valACYclovir (VALTREX) 500 mg tablet  Self Yes No   Sig: Take 500 mg by mouth daily   vitamin B-12 (VITAMIN B-12) 1,000 mcg tablet  Self Yes No   Sig: Take by mouth daily      Facility-Administered Medications: None       Past Medical History:   Diagnosis Date   • ABPA (allergic bronchopulmonary aspergillosis) ([address]) 07/08/2012   • Acute kidney injury ([address]) 09/08/2017   • Acute on chronic respiratory failure ([address]) 03/04/2017    Last Assessment & Plan:  Formatting of this note might be different from the original. Recurrent with clinical worsening with enlarging PTX, s/p multiple chest tubes, but new fevers 6/19 and worsening leukocytosis indicating infection as cause of 6/19 decompensation with profound hypoxia in setting of shunting through right lung and continued pneumothorax s/p 2nd chest tube placement. 6/27: Right   • Anaphylaxis 06/06/2022   • Anemia    • Asthma    • Chest pain 09/21/2021   • Chronic pain    • Chronic respiratory failure with hypoxia ([address]) 09/07/2021   • COVID-19 06/03/2022   • Cystic fibrosis ([address])    • Deep vein thrombosis (HCC)    • Diabetes mellitus ([address])    • GERD (gastroesophageal reflux disease)    • HBP (high blood pressure)    • HCAP (healthcare-associated pneumonia) 03/06/2020   • History of transfusion    • Influenza B 02/23/2020   • Kidney stone    • Neutropenic fever ([address]) 09/26/2020   • Pneumonia due to Pseudomonas species ([address]) 10/28/2010    Last Assessment & Plan:  Formatting of this note might be different from the original. Polymicrobial pna w/ pseudomonas, M.  Abscessus, Cadida glabrata/albicans   • PONV (postoperative nausea and vomiting)    • Pseudomonas aeruginosa infection 09/20/2012   • Squamous cell skin cancer 01/12/2023    SCCIS- Left upper thigh   • Transplant recipient     Lung- 2013, 2017   • Ulcerative colitis ([address])    • Wellness examination 02/04/2013    Formatting of this note might be different from the original. Discussed with patient and acknowledgement form signed on 2/4/2013 (ALYSHA)       Past Surgical History:   Procedure Laterality Date   • APPENDECTOMY     • AUGMENTATION MAMMAPLASTY Bilateral 2004   • AUGMENTATION MAMMAPLASTY Right 2021    removed    • BREAST SURGERY      Aug.    • BRONCHOSCOPY     • CHOLECYSTECTOMY     • COLONOSCOPY     • EGD  07/2019    [facility].     • FL RETROGRADE PYELOGRAM  01/09/2020   • FL RETROGRADE PYELOGRAM  02/04/2020   • GASTROSTOMY TUBE, PLACE     • IR BALLOON-OCCLUDED ANTEGRADE TRANSVENOUS OBLITERATION (BATO)  11/22/2022   • IR PORT PLACEMENT  03/22/2022   • IR PORT REMOVAL  01/10/2022   • IR PORT STRIPPING  11/25/2022   • IR TUNNELED CENTRAL LINE PLACEMENT  01/13/2022   • LUNG SURGERY     • LUNG TRANSPLANT      X2   • MS CYSTO BLADDER W/URETERAL CATHETERIZATION Right 01/09/2020    Procedure: CYSTOSCOPY RETROGRADE PYELOGRAM WITH INSERTION STENT URETERAL;  Surgeon: Chaparrita Julian MD;  Location: [facility];  Service: Urology   • MS CYSTO/URETERO W/LITHOTRIPSY &INDWELL STENT INSRT Right 02/04/2020    Procedure: CYSTOSCOPY URETEROSCOPY WITH LITHOTRIPSY HOLMIUM LASER, RETROGRADE PYELOGRAM AND INSERTION STENT URETERAL;  Surgeon: Chaparrita Julian MD;  Location: [facility];  Service: Urology   • SKIN BIOPSY     • US GUIDED VASCULAR ACCESS  01/17/2017       Family History   Problem Relation Age of Onset   • Alcohol abuse Mother    • Mental illness Mother    • Depression Mother    • Alcohol abuse Father    • COPD Maternal Grandmother    • Lung cancer Maternal Grandmother 72   • Cancer Maternal Grandmother    • COPD Maternal Grandfather    • Cancer Maternal Grandfather    • COPD Paternal Grandmother    • Lung cancer Paternal Grandmother 72   • No Known Problems Sister    • No Known Problems Paternal Aunt    • Breast cancer Neg Hx    • Ovarian cancer Neg Hx    • Colon cancer Neg Hx      I have reviewed and agree with the history as documented. E-Cigarette/Vaping   • E-Cigarette Use Never User      E-Cigarette/Vaping Substances   • Nicotine No    • THC No    • CBD No    • Flavoring No    • Other No    • Unknown No      Social History     Tobacco Use   • Smoking status: Former     Packs/day: 0.50     Years: 12.00     Total pack years: 6.00     Types: Cigarettes     Start date: 1993     Quit date: 2005     Years since quittin.6     Passive exposure: Past   • Smokeless tobacco: Never   Vaping Use   • Vaping Use: Never used   Substance Use Topics   • Alcohol use: Yes     Alcohol/week: 2.0 standard drinks of alcohol     Types: 2 Glasses of wine per week     Comment: social   • Drug use: Yes     Frequency: 2.0 times per week     Types: Marijuana     Comment: medical edible marijuana prescribed       Review of Systems   All other systems reviewed and are negative. Physical Exam  Physical Exam  Vitals reviewed. Constitutional:       General: She is not in acute distress. Appearance: Normal appearance. She is not ill-appearing. HENT:      Mouth/Throat:      Mouth: Mucous membranes are moist.   Eyes:      Conjunctiva/sclera: Conjunctivae normal.      Comments: Normal conjunctiva   Cardiovascular:      Rate and Rhythm: Normal rate and regular rhythm. Pulses:           Radial pulses are 1+ on the right side and 1+ on the left side. Heart sounds: Normal heart sounds. Pulmonary:      Effort: Pulmonary effort is normal.      Breath sounds: Normal breath sounds. Chest:      Chest wall: No tenderness. Abdominal:      General: Abdomen is flat. Palpations: Abdomen is soft. Tenderness: There is no abdominal tenderness. Musculoskeletal:         General: No swelling. Normal range of motion. Cervical back: Neck supple. Right lower leg: No tenderness. No edema. Left lower leg: Tenderness present. No edema.    Skin:     General: Skin is warm and dry. Neurological:      General: No focal deficit present. Mental Status: She is alert. Psychiatric:         Mood and Affect: Mood normal.         Vital Signs  ED Triage Vitals   Temperature Pulse Respirations Blood Pressure SpO2   08/30/23 1411 08/30/23 1411 08/30/23 1411 08/30/23 1411 08/30/23 1411   99 °F (37.2 °C) 88 22 125/93 100 %      Temp Source Heart Rate Source Patient Position - Orthostatic VS BP Location FiO2 (%)   08/30/23 1411 08/30/23 1411 08/30/23 1700 08/30/23 1411 --   Tympanic Monitor Sitting Left arm       Pain Score       08/30/23 1615       9           Vitals:    08/30/23 1500 08/30/23 1700 08/30/23 1900 08/30/23 1930   BP: 167/81 160/81 140/80 158/83   Pulse: 86 90 85 83   Patient Position - Orthostatic VS:  Sitting Sitting Sitting         Visual Acuity      ED Medications  Medications   sodium chloride 0.9 % bolus 1,000 mL (0 mL Intravenous Stopped 8/30/23 1714)   albuterol inhalation solution 10 mg (10 mg Nebulization Given 8/30/23 1540)     And   ipratropium (ATROVENT) 0.02 % inhalation solution 1 mg (1 mg Nebulization Given 8/30/23 1540)     And   sodium chloride 0.9 % inhalation solution 3 mL (3 mL Nebulization Given 8/30/23 1540)   morphine injection 4 mg (4 mg Intravenous Given 8/30/23 1615)   HYDROmorphone (DILAUDID) injection 0.5 mg (0.5 mg Intravenous Given 8/30/23 1804)   iohexol (OMNIPAQUE) 350 MG/ML injection (MULTI-DOSE) 85 mL (85 mL Intravenous Given 8/30/23 1750)       Diagnostic Studies  Results Reviewed     Procedure Component Value Units Date/Time    HS Troponin I 2hr [[id number]]  (Normal) Collected: 08/30/23 1809    Lab Status: Final result Specimen: Blood from Line Updated: 08/30/23 1843     hs TnI 2hr 4 ng/L      Delta 2hr hsTnI 0 ng/L     Blood culture #2 [[id number]] Collected: 08/30/23 1730    Lab Status: In process Specimen: Blood from Arm, Left Updated: 08/30/23 1733    Blood culture #1 [[id number]] Collected: 08/30/23 1723    Lab Status:  In process Specimen: Blood from Arm, Left Updated: 08/30/23 1726    FLU/RSV/COVID - if FLU/RSV clinically relevant [[id number]]  (Normal) Collected: 08/30/23 1605    Lab Status: Final result Specimen: Nares from Nose Updated: 08/30/23 1650     SARS-CoV-2 Negative     INFLUENZA A PCR Negative     INFLUENZA B PCR Negative     RSV PCR Negative    Narrative:      FOR PEDIATRIC PATIENTS - copy/paste COVID Guidelines URL to browser: [email or web address]    SARS-CoV-2 assay is a Nucleic Acid Amplification assay intended for the  qualitative detection of nucleic acid from SARS-CoV-2 in nasopharyngeal  swabs. Results are for the presumptive identification of SARS-CoV-2 RNA. Positive results are indicative of infection with SARS-CoV-2, the virus  causing COVID-19, but do not rule out bacterial infection or co-infection  with other viruses. Laboratories within the [facility] and its  territories are required to report all positive results to the appropriate  public health authorities. Negative results do not preclude SARS-CoV-2  infection and should not be used as the sole basis for treatment or other  patient management decisions. Negative results must be combined with  clinical observations, patient history, and epidemiological information. This test has not been FDA cleared or approved. This test has been authorized by FDA under an Emergency Use Authorization  (EUA). This test is only authorized for the duration of time the  declaration that circumstances exist justifying the authorization of the  emergency use of an in vitro diagnostic tests for detection of SARS-CoV-2  virus and/or diagnosis of COVID-19 infection under section 564(b)(1) of  the Act, 21 U. S.C. 691LUQ-3(Z)(2), unless the authorization is terminated  or revoked sooner. The test has been validated but independent review by FDA  and CLIA is pending. Test performed using [employer]:  This RT-PCR assay targets N2,  a region unique to SARS-CoV-2. A conserved region in the E-gene was chosen  for pan-Sarbecovirus detection which includes SARS-CoV-2. According to CMS-2020-01-R, this platform meets the definition of high-throughput technology. HS Troponin 0hr (reflex protocol) [[id number]]  (Normal) Collected: 08/30/23 1605    Lab Status: Final result Specimen: Blood from Central Venous Line Updated: 08/30/23 1642     hs TnI 0hr 4 ng/L     hCG, qualitative pregnancy [[id number]]  (Normal) Collected: 08/30/23 1605    Lab Status: Final result Specimen: Blood from Central Venous Line Updated: 08/30/23 1640     Preg, Serum Negative    B-Type Natriuretic Peptide(BNP) [[id number]]  (Abnormal) Collected: 08/30/23 1605    Lab Status: Final result Specimen: Blood from Central Venous Line Updated: 08/30/23 1640      pg/mL     Lactic acid, plasma (w/reflex if result > 2.0) [[id number]]  (Normal) Collected: 08/30/23 1605    Lab Status: Final result Specimen: Blood from Central Venous Line Updated: 08/30/23 1630     LACTIC ACID 1.9 mmol/L     Narrative:      Result may be elevated if tourniquet was used during collection.     Comprehensive metabolic panel [[id number]]  (Abnormal) Collected: 08/30/23 1605    Lab Status: Final result Specimen: Blood from Central Venous Line Updated: 08/30/23 1630     Sodium 132 mmol/L      Potassium 4.8 mmol/L      Chloride 99 mmol/L      CO2 26 mmol/L      ANION GAP 7 mmol/L      BUN 15 mg/dL      Creatinine 1.15 mg/dL      Glucose 236 mg/dL      Calcium 9.9 mg/dL      AST 17 U/L      ALT 17 U/L      Alkaline Phosphatase 78 U/L      Total Protein 7.7 g/dL      Albumin 4.0 g/dL      Total Bilirubin 0.28 mg/dL      eGFR 57 ml/min/1.73sq m     Narrative:      [place] guidelines for Chronic Kidney Disease (CKD):   •  Stage 1 with normal or high GFR (GFR > 90 mL/min/1.73 square meters)  •  Stage 2 Mild CKD (GFR = 60-89 mL/min/1.73 square meters)  •  Stage 3A Moderate CKD (GFR = 45-59 mL/min/1.73 square meters)  •  Stage 3B Moderate CKD (GFR = 30-44 mL/min/1.73 square meters)  •  Stage 4 Severe CKD (GFR = 15-29 mL/min/1.73 square meters)  •  Stage 5 End Stage CKD (GFR <15 mL/min/1.73 square meters)  Note: GFR calculation is accurate only with a steady state creatinine    Protime-INR [[id number]]  (Abnormal) Collected: 08/30/23 1605    Lab Status: Final result Specimen: Blood from Central Venous Line Updated: 08/30/23 1624     Protime 16.1 seconds      INR 1.23    APTT [[id number]]  (Normal) Collected: 08/30/23 1605    Lab Status: Final result Specimen: Blood from Central Venous Line Updated: 08/30/23 1624     PTT 32 seconds     CBC and differential [[id number]]  (Abnormal) Collected: 08/30/23 1605    Lab Status: Final result Specimen: Blood from Central Venous Line Updated: 08/30/23 1613     WBC 8.95 Thousand/uL      RBC 4.23 Million/uL      Hemoglobin 9.3 g/dL      Hematocrit 32.1 %      MCV 76 fL      MCH 22.0 pg      MCHC 29.0 g/dL      RDW 17.5 %      MPV 10.3 fL      Platelets 373 Thousands/uL      nRBC 0 /100 WBCs      Neutrophils Relative 81 %      Immat GRANS % 0 %      Lymphocytes Relative 13 %      Monocytes Relative 5 %      Eosinophils Relative 1 %      Basophils Relative 0 %      Neutrophils Absolute 7.25 Thousands/µL      Immature Grans Absolute 0.02 Thousand/uL      Lymphocytes Absolute 1.15 Thousands/µL      Monocytes Absolute 0.45 Thousand/µL      Eosinophils Absolute 0.06 Thousand/µL      Basophils Absolute 0.02 Thousands/µL                  CTA ED chest PE study   Final Result by Aliya Delgado MD (08/30 1924)      No pulmonary embolus. Left lung transplant with no acute pulmonary disease.             Workstation performed: [id number]         XR chest 1 view portable   ED Interpretation by Alicia Minaya DO (08/30 1519)   Unchanged from prior      Final Result by Jennifer Easley MD (08/30 1536)      Right pneumonectomy      Clear left lung Workstation performed: [id number]                    Procedures  ECG 12 Lead Documentation Only    Date/Time: 8/30/2023 7:38 PM    Performed by: Nic Garcia DO  Authorized by: Nic Garcia DO    Patient location:  ED  Previous ECG:     Previous ECG:  Compared to current    Similarity:  No change  Interpretation:     Interpretation: normal    Rate:     ECG rate assessment: normal    Rhythm:     Rhythm: sinus rhythm    Ectopy:     Ectopy: none    QRS:     QRS axis:  Normal    QRS intervals:  Normal  Conduction:     Conduction: normal    ST segments:     ST segments:  Normal  T waves:     T waves: normal               ED Course  ED Course as of 08/30/23 2209   Wed Aug 30, 2023   1936 Work-up unremarkable, patient still with no wheezing on exam, have discussed return precautions and outpatient follow-up for likely viral infection and patient states he understands. HEART Risk Score    Flowsheet Row Most Recent Value   Heart Score Risk Calculator    History 1 Filed at: 08/30/2023 1937   ECG 0 Filed at: 08/30/2023 1937   Age 0 Filed at: 08/30/2023 1937   Risk Factors 2 Filed at: 08/30/2023 1937   Troponin 0 Filed at: 08/30/2023 1937   HEART Score 3 Filed at: 08/30/2023 1937                        SBIRT 22yo+    Flowsheet Row Most Recent Value   Initial Alcohol Screen: US AUDIT-C     1. How often do you have a drink containing alcohol? 0 Filed at: 08/30/2023 1819   2. How many drinks containing alcohol do you have on a typical day you are drinking? 0 Filed at: 08/30/2023 1819   3b. FEMALE Any Age, or MALE 65+: How often do you have 4 or more drinks on one occassion? 0 Filed at: 08/30/2023 1819   Audit-C Score 0 Filed at: 08/30/2023 1819   NICOLE: How many times in the past year have you. .. Used an illegal drug or used a prescription medication for non-medical reasons?  Never Filed at: 08/30/2023 1819                    Medical Decision Making  Pt is a 47F PMHx of CF, DM, HTN, pHTN, CKD2, UC, anemia, p/w CP, SOB. Pt is well appearing at bedside with stable vitals and in NAD. Lung CTA, right lung surgically absent, with equal pulses and no LE edema, but left calf tenderness which is chronic from DVT. Will obtain CTPE/XR tor/o PE, PNA, PTX, effusion, labs and ekg to r/o ACS, CHF, electrolyte abnormalities, anemia, give neb and continue to monitor however as lungs are clear will hold further steroids at this time. Amount and/or Complexity of Data Reviewed  Labs: ordered. Radiology: ordered and independent interpretation performed. Risk  Prescription drug management. Disposition  Final diagnoses:   Chest pain     Time reflects when diagnosis was documented in both MDM as applicable and the Disposition within this note     Time User Action Codes Description Comment    8/30/2023  7:37 PM Clint Langston [R07.9] Chest pain       ED Disposition     ED Disposition   Discharge    Condition   Stable    Date/Time   Wed Aug 30, 2023  7:37 PM    Comment   Elsy Coker [facility] discharge to home/self care.                Follow-up Information     Follow up With Specialties Details Why Contact Info Additional Information    Micky Garner MD Internal Medicine In 1 week  [address] [facility] [id number] [address] [facility] Emergency Department Emergency Medicine  If symptoms worsen [address] [facility], [place], [place], [address]          Discharge Medication List as of 8/30/2023  7:38 PM      CONTINUE these medications which have NOT CHANGED    Details   acetaminophen (TYLENOL) 325 mg tablet Take 3 tablets (975 mg total) by mouth every 8 (eight) hours, Starting Sun 9/26/2021, No Print      albuterol (2.5 mg/3 mL) 0.083 % nebulizer solution Take 6 mL (5 mg total) by nebulization 2 (two) times a day, Starting Sun 9/26/2021, No Print      ALPRAZolam (XANAX) 1 mg tablet Take 1 tablet (1 mg total) by mouth daily at bedtime as needed for anxiety or sleep, Starting Thu 8/24/2023, Normal      apixaban (ELIQUIS) 5 mg Take 1 tablet (5 mg total) by mouth 2 (two) times a day, Starting Mon 6/5/2023, Until Thu 5/30/2024, No Print      atovaquone (MEPRON) 750 mg/5 mL suspension Take 1,500 mg by mouth daily, Historical Med      azithromycin (ZITHROMAX) 250 mg tablet Take 250 mg by mouth 3 (three) times a week, Starting Fri 1/6/2023, Historical Med      !! BD Pen Needle Abi U/F 32G X 4 MM MISC 4 times daily, Normal      bisacodyl (DULCOLAX) 5 mg EC tablet Take 1 tablet (5 mg total) by mouth daily at bedtime, Starting Sun 5/7/2023, Normal      Cholecalciferol (Vitamin D3) 125 MCG (5000 UT) TABS Take 5,000 Units by mouth daily, Historical Med      Continuous Blood Gluc  (Dexcom G7 ) SUKHI Use 1 each continuous, Starting Mon 7/31/2023, Normal      Continuous Blood Gluc Sensor (Dexcom G7 Sensor) Use 1 Device every 10 days, Starting Mon 7/31/2023, Normal      dexlansoprazole (DEXILANT) 60 MG capsule TAKE 1 CAPSULE EVERY MORNING, Normal      dronabinol (MARINOL) 5 MG capsule TAKE 1 CAPSULE (5 MG TOTAL) BY MOUTH 3 (THREE) TIMES A DAY, Starting Thu 8/19/2021, Normal      Hydrocod Edgar-Chlorphe Edgar ER (TUSSIONEX) 10-8 mg/5 mL ER suspension Take 5 mL by mouth every 12 (twelve) hours as needed for cough Max Daily Amount: 10 mL, Starting Mon 8/28/2023, Normal      insulin aspart (NovoLOG FlexPen) 100 UNIT/ML injection pen Inject 4 units three times a day with meals, Fill Later      !!  Insulin Pen Needle (Comfort EZ Pen Needles) 33G X 4 MM MISC Use to inject insulin 4 times a day, Normal      levofloxacin (LEVAQUIN) 750 mg tablet Take 1 tablet (750 mg total) by mouth every 24 hours for 7 days, Starting Mon 8/28/2023, Until Mon 9/4/2023, Normal      naloxone (NARCAN) 4 mg/0.1 mL nasal spray 4 mg into each nostril, Starting Mon 6/6/2022, Historical Med      oxyCODONE (ROXICODONE) 10 MG TABS Take 1 tablet (10 mg total) by mouth every 8 (eight) hours as needed for severe pain Max Daily Amount: 30 mg, Starting Thu 8/24/2023, Normal      pancrelipase, Lip-Prot-Amyl, (CREON) 24,000 units Take 48,000 Units by mouth 3 (three) times a day with meals, Historical Med      polyethylene glycol (MIRALAX) 17 g packet Take 17 g by mouth 2 (two) times a day, Starting Sun 5/7/2023, No Print      predniSONE 10 mg tablet Take 10 mg by mouth daily, Starting Tue 4/27/2021, Historical Med      pregabalin (LYRICA) 150 mg capsule Take 1 capsule (150 mg total) by mouth 2 (two) times a day, Starting Mon 2/27/2023, Normal      Respiratory Therapy Supplies (Nebulizer) SUKHI Please dispense nebulizer machine, Historical Med      Semglee, yfgn, 100 UNIT/ML SOPN Inject 0.12 mL (12 Units total) as directed daily at bedtime 12 units hs, Starting Wed 3/22/2023, Normal      sodium chloride 3 % inhalation solution Take 4 mL by nebulization every 8 (eight) hours as needed for cough for up to 7 days, Starting Mon 8/28/2023, Until Mon 9/4/2023 at 2359, Normal      !! tacrolimus (PROGRAF) 0.5 mg capsule Starting Wed 12/21/2022, Historical Med      !! tacrolimus (PROGRAF) 1 mg capsule Take 1 mg by mouth 2 (two) times a day Take 2 tablets and a 0.5 table to total 2.5 mg twice a day 5mg total daily, Starting Thu 7/1/2021, Historical Med      valACYclovir (VALTREX) 500 mg tablet Take 500 mg by mouth daily, Starting Tue 6/1/2021, Historical Med      vitamin B-12 (VITAMIN B-12) 1,000 mcg tablet Take by mouth daily, Historical Med       !! - Potential duplicate medications found. Please discuss with provider. No discharge procedures on file.     PDMP Review       Value Time User    PDMP Reviewed  Yes 8/28/2023  2:35 PM Florence Vazquez MD          ED Provider  Electronically Signed by           Freddie Hi,   08/30/23 [address] [facility], DO  08/30/23 0298

## 2023-09-03 LAB
BACTERIA BLD CULT: NORMAL
BACTERIA BLD CULT: NORMAL

## 2023-09-04 LAB
BACTERIA BLD CULT: NORMAL
BACTERIA BLD CULT: NORMAL

## 2023-09-05 DIAGNOSIS — K21.00 GASTROESOPHAGEAL REFLUX DISEASE WITH ESOPHAGITIS WITHOUT HEMORRHAGE: ICD-10-CM

## 2023-09-05 RX ORDER — DEXLANSOPRAZOLE 60 MG/1
CAPSULE, DELAYED RELEASE ORAL
Qty: 30 CAPSULE | Refills: 0 | Status: SHIPPED | OUTPATIENT
Start: 2023-09-05

## 2023-09-05 NOTE — TELEPHONE ENCOUNTER
Received fax refill request for Dexlansoprazole 60 mg daily from [facility], 90 day supply    Thank you

## 2023-09-06 ENCOUNTER — OFFICE VISIT (OUTPATIENT)
Dept: ENDOCRINOLOGY | Facility: CLINIC | Age: 48
End: 2023-09-06
Payer: COMMERCIAL

## 2023-09-06 VITALS
TEMPERATURE: 97.6 F | OXYGEN SATURATION: 98 % | HEIGHT: 62 IN | HEART RATE: 84 BPM | BODY MASS INDEX: 22.93 KG/M2 | DIASTOLIC BLOOD PRESSURE: 76 MMHG | WEIGHT: 124.6 LBS | SYSTOLIC BLOOD PRESSURE: 138 MMHG

## 2023-09-06 DIAGNOSIS — E10.22 TYPE 1 DIABETES MELLITUS WITH DIABETIC CHRONIC KIDNEY DISEASE, UNSPECIFIED CKD STAGE (HCC): Primary | ICD-10-CM

## 2023-09-06 DIAGNOSIS — I27.20 PULMONARY HYPERTENSION (HCC): ICD-10-CM

## 2023-09-06 DIAGNOSIS — N18.31 STAGE 3A CHRONIC KIDNEY DISEASE (HCC): ICD-10-CM

## 2023-09-06 DIAGNOSIS — E84.9 CYSTIC FIBROSIS (HCC): ICD-10-CM

## 2023-09-06 PROCEDURE — 99214 OFFICE O/P EST MOD 30 MIN: CPT | Performed by: INTERNAL MEDICINE

## 2023-09-06 RX ORDER — INSULIN ASPART 100 [IU]/ML
INJECTION, SOLUTION INTRAVENOUS; SUBCUTANEOUS
Qty: 15 ML | Refills: 3 | Status: SHIPPED | OUTPATIENT
Start: 2023-09-06

## 2023-09-06 RX ORDER — INSULIN GLARGINE-YFGN 100 [IU]/ML
6 INJECTION, SOLUTION SUBCUTANEOUS
Qty: 3 ML | Refills: 1 | Status: SHIPPED | OUTPATIENT
Start: 2023-09-06

## 2023-09-06 RX ORDER — VALACYCLOVIR HYDROCHLORIDE 1 G/1
TABLET, FILM COATED ORAL
COMMUNITY
Start: 2023-09-01

## 2023-09-06 RX ORDER — ACYCLOVIR 400 MG/1
1 TABLET ORAL
Qty: 3 EACH | Refills: 5 | Status: SHIPPED | OUTPATIENT
Start: 2023-09-06

## 2023-09-06 NOTE — PATIENT INSTRUCTIONS
Humalog Insulin 5 7 8     Regular, Apidra, Humalog orNovolog Sliding Scale:   <80                      151-200 + 1 +1 +1     201-250 +2 +2 +2 +   251-300 +3 +3 +3 +   351-400 +4 +4 +4 +   >401 +5 +5 +5 +         Lantus Insulin  6u

## 2023-09-06 NOTE — PROGRESS NOTES
Follow-up Patient Progress Note      CC: Type 1 diabetes     History of Present Illness:   42 yr female with hx of cystic fibrosis diagnosed age 7 months s/p redo Lt lung transplant 3/2017 and follow up by [facility], recurrent PNA c pseudomonas and long term steroids, Type 1 diabetes, gastroparesis, pulmonary HTN, HTN, hx of DVT on apixaban, osteoporosis being managed by PCP, anemia, chronic pain and chronic glucocorticoid and immunosuppressive therapy.  Last visit was 2/8/23.     Since last visit, weight increased 13 lbs.     She has been on insulin therapy for cystic fibrosis related diabetes for about 8 years associated with insulin deficiency as established at [address] [place] [address].       CGM data review[de-identified]  Device: dexcom Dates: -9/6/23 Usage:  % Av glu: 187 mg/dL  SD:  mg/dL CV: 30  % GMI: 7.8  %  TIR: 50 %  TAR: 26+21 %  TBR: 0  %    Glycemic patters: Significant postprandial hyperglycemia. Mild fasting hyperglycemia.   Hypoglycemia: No     Current meds:  Glargine 12u QHS  Humalog 5u - 7u - 8u plus 1 unit /50 mg/dL above 150 mg/dL.     Opthamology: No  Podiatry: No  Vaccination:    Dental:  Pancreatitis: yes     Ace/ARB: No  Statin:No  Thyroid issues: No    Patient Active Problem List   Diagnosis   • S/P pneumonectomy   • Type 1 diabetes (HCC)   • Pancreatic insufficiency   • Asthma   • Chronic pain disorder   • DVT (deep venous thrombosis) (HCC)   • Acute on chronic anemia   • Long term current use of bisphosphonates   • Long term current use of systemic steroids   • Osteoporosis   • Anxiety   • CKD (chronic kidney disease) stage 2, GFR 60-89 ml/min   • Chronic sinusitis   • Colon polyp   • Esophageal reflux   • Hiatal hernia   • Immunosuppression (HCC)   • Severe protein-calorie malnutrition (HCC)   • Transplant rejection   • Cystic fibrosis (HCC)   • Essential hypertension   • Hypercholesteremia   • Weakness of right lower extremity   • Moderate protein-calorie malnutrition (HCC)   • Neuropathic pain of right foot   • Ulcerative pancolitis without complication (HCC)   • Gastroparesis   • Abnormal uterine bleeding (AUB)   • Pulmonary hypertension (HCC)   • Renal calculus   • Pulmonary nodules   • Rib pain   • Chronic cough   • Right leg pain   • Pulmonary embolus (HCC)   • Transplant recipient   • Abdominal wall lump   • Hyponatremia   • Chronic pancreatitis, unspecified pancreatitis type ([address])   • Depressed mood   • Encounter for screening mammogram for malignant neoplasm of breast   • Dyspnea on exertion     Past Medical History:   Diagnosis Date   • ABPA (allergic bronchopulmonary aspergillosis) ([address]) 07/08/2012   • Acute kidney injury ([address]) 09/08/2017   • Acute on chronic respiratory failure ([address]) 03/04/2017    Last Assessment & Plan:  Formatting of this note might be different from the original. Recurrent with clinical worsening with enlarging PTX, s/p multiple chest tubes, but new fevers 6/19 and worsening leukocytosis indicating infection as cause of 6/19 decompensation with profound hypoxia in setting of shunting through right lung and continued pneumothorax s/p 2nd chest tube placement. 6/27: Right   • Anaphylaxis 06/06/2022   • Anemia    • Asthma    • Chest pain 09/21/2021   • Chronic pain    • Chronic respiratory failure with hypoxia ([address]) 09/07/2021   • COVID-19 06/03/2022   • Cystic fibrosis ([address])    • Deep vein thrombosis (HCC)    • Diabetes mellitus ([address])    • GERD (gastroesophageal reflux disease)    • HBP (high blood pressure)    • HCAP (healthcare-associated pneumonia) 03/06/2020   • History of transfusion    • Influenza B 02/23/2020   • Kidney stone    • Neutropenic fever ([address]) 09/26/2020   • Pneumonia due to Pseudomonas species ([address]) 10/28/2010    Last Assessment & Plan:  Formatting of this note might be different from the original. Polymicrobial pna w/ pseudomonas, M.  Abscessus, Cadida glabrata/albicans   • PONV (postoperative nausea and vomiting)    • Pseudomonas aeruginosa infection 09/20/2012   • Squamous cell skin cancer 01/12/2023    SCCIS- Left upper thigh   • Transplant recipient     Lung- 2013, 2017   • Ulcerative colitis [facility])    • Wellness examination 02/04/2013    Formatting of this note might be different from the original. Discussed with patient and acknowledgement form signed on 2/4/2013 (ALYSHA)      Past Surgical History:   Procedure Laterality Date   • APPENDECTOMY     • AUGMENTATION MAMMAPLASTY Bilateral 2004   • AUGMENTATION MAMMAPLASTY Right 2021    removed    • BREAST SURGERY      Aug.    • BRONCHOSCOPY     • CHOLECYSTECTOMY     • COLONOSCOPY     • EGD  07/2019    Yeast Infect.     • FL RETROGRADE PYELOGRAM  01/09/2020   • FL RETROGRADE PYELOGRAM  02/04/2020   • GASTROSTOMY TUBE, PLACE     • IR BALLOON-OCCLUDED ANTEGRADE TRANSVENOUS OBLITERATION (BATO)  11/22/2022   • IR PORT PLACEMENT  03/22/2022   • IR PORT REMOVAL  01/10/2022   • IR PORT STRIPPING  11/25/2022   • IR TUNNELED CENTRAL LINE PLACEMENT  01/13/2022   • LUNG SURGERY     • LUNG TRANSPLANT      X2   • MI CYSTO BLADDER W/URETERAL CATHETERIZATION Right 01/09/2020    Procedure: CYSTOSCOPY RETROGRADE PYELOGRAM WITH INSERTION STENT URETERAL;  Surgeon: Isidra Hightower MD;  Location: [facility];  Service: Urology   • MI CYSTO/URETERO W/LITHOTRIPSY &INDWELL STENT INSRT Right 02/04/2020    Procedure: CYSTOSCOPY URETEROSCOPY WITH LITHOTRIPSY HOLMIUM LASER, RETROGRADE PYELOGRAM AND INSERTION STENT URETERAL;  Surgeon: Isidra Hightower MD;  Location: [facility];  Service: Urology   • SKIN BIOPSY     • US GUIDED VASCULAR ACCESS  01/17/2017      Family History   Problem Relation Age of Onset   • Alcohol abuse Mother    • Mental illness Mother    • Depression Mother    • Alcohol abuse Father    • COPD Maternal Grandmother    • Lung cancer Maternal Grandmother 72   • Cancer Maternal Grandmother    • COPD Maternal Grandfather    • Cancer Maternal Grandfather    • COPD Paternal Grandmother    • Lung cancer Paternal Grandmother 72   • No Known Problems Sister • No Known Problems Paternal Aunt    • Breast cancer Neg Hx    • Ovarian cancer Neg Hx    • Colon cancer Neg Hx      Social History     Tobacco Use   • Smoking status: Former     Packs/day: 0.50     Years: 12.00     Total pack years: 6.00     Types: Cigarettes     Start date: 1993     Quit date: 2005     Years since quittin.6     Passive exposure: Past   • Smokeless tobacco: Never   Substance Use Topics   • Alcohol use:  Yes     Alcohol/week: 2.0 standard drinks of alcohol     Types: 2 Glasses of wine per week     Comment: social     Allergies   Allergen Reactions   • Bebtelovimab Anaphylaxis   • Vancomycin Angioedema and Hives   • Gabapentin Hallucinations   • Ceftazidime Headache     Severe headaches after desensitization   • Nsaids Other (See Comments)     Lung transplant increases risk of renal toxicity, call lung txp provider to discuss if needed         Current Outpatient Medications:   •  acetaminophen (TYLENOL) 325 mg tablet, Take 3 tablets (975 mg total) by mouth every 8 (eight) hours, Disp: , Rfl: 0  •  albuterol (2.5 mg/3 mL) 0.083 % nebulizer solution, Take 6 mL (5 mg total) by nebulization 2 (two) times a day, Disp: , Rfl: 0  •  ALPRAZolam (XANAX) 1 mg tablet, Take 1 tablet (1 mg total) by mouth daily at bedtime as needed for anxiety or sleep, Disp: 30 tablet, Rfl: 0  •  apixaban (ELIQUIS) 5 mg, Take 1 tablet (5 mg total) by mouth 2 (two) times a day, Disp: 30 tablet, Rfl: 0  •  atovaquone (MEPRON) 750 mg/5 mL suspension, Take 1,500 mg by mouth daily, Disp: , Rfl:   •  azithromycin (ZITHROMAX) 250 mg tablet, Take 250 mg by mouth 3 (three) times a week, Disp: , Rfl:   •  BD Pen Needle Abi U/F 32G X 4 MM MISC, 4 times daily, Disp: 400 each, Rfl: 1  •  bisacodyl (DULCOLAX) 5 mg EC tablet, Take 1 tablet (5 mg total) by mouth daily at bedtime, Disp: 30 tablet, Rfl: 0  •  Cholecalciferol (Vitamin D3) 125 MCG (5000 UT) TABS, Take 5,000 Units by mouth daily, Disp: , Rfl:   •  Continuous Blood Gluc  ([employer] [address] ) SUKHI, Use 1 each continuous, Disp: 1 each, Rfl: 0  •  Continuous Blood Gluc Sensor (Dexcom G7 Sensor), Use 1 Device every 10 days, Disp: 3 each, Rfl: 5  •  dexlansoprazole (DEXILANT) 60 MG capsule, Take 1 capsule every morning, Disp: 30 capsule, Rfl: 0  •  dronabinol (MARINOL) 5 MG capsule, TAKE 1 CAPSULE (5 MG TOTAL) BY MOUTH 3 (THREE) TIMES A DAY, Disp: 90 capsule, Rfl: 3  •  Hydrocod Edgar-Chlorphe Edgar ER (TUSSIONEX) 10-8 mg/5 mL ER suspension, Take 5 mL by mouth every 12 (twelve) hours as needed for cough Max Daily Amount: 10 mL, Disp: 70 mL, Rfl: 0  •  insulin aspart (NovoLOG FlexPen) 100 UNIT/ML injection pen, Inject 4 units three times a day with meals, Disp: 15 mL, Rfl: 3  •  Insulin Pen Needle (Comfort EZ Pen Needles) 33G X 4 MM MISC, Use to inject insulin 4 times a day, Disp: 100 each, Rfl: 3  •  naloxone (NARCAN) 4 mg/0.1 mL nasal spray, 4 mg into each nostril, Disp: , Rfl:   •  oxyCODONE (ROXICODONE) 10 MG TABS, Take 1 tablet (10 mg total) by mouth every 8 (eight) hours as needed for severe pain Max Daily Amount: 30 mg, Disp: 90 tablet, Rfl: 0  •  pancrelipase, Lip-Prot-Amyl, (CREON) 24,000 units, Take 48,000 Units by mouth 3 (three) times a day with meals, Disp: , Rfl:   •  polyethylene glycol (MIRALAX) 17 g packet, Take 17 g by mouth 2 (two) times a day, Disp: , Rfl: 0  •  predniSONE 10 mg tablet, Take 10 mg by mouth daily, Disp: , Rfl:   •  pregabalin (LYRICA) 150 mg capsule, Take 1 capsule (150 mg total) by mouth 2 (two) times a day, Disp: 180 capsule, Rfl: 1  •  Respiratory Therapy Supplies (Nebulizer) SUKHI, Please dispense nebulizer machine, Disp: , Rfl:   •  Semglee, yfgn, 100 UNIT/ML SOPN, Inject 0.12 mL (12 Units total) as directed daily at bedtime 12 units hs, Disp: 3 mL, Rfl: 3  •  tacrolimus (PROGRAF) 0.5 mg capsule, , Disp: , Rfl:   •  tacrolimus (PROGRAF) 1 mg capsule, Take 1 mg by mouth 2 (two) times a day Take 2 tablets and a 0.5 table to total 2.5 mg twice a day 5mg total daily, Disp: , Rfl:   •  valACYclovir (VALTREX) 1,000 mg tablet, , Disp: , Rfl:   •  valACYclovir (VALTREX) 500 mg tablet, Take 500 mg by mouth daily, Disp: , Rfl:   •  vitamin B-12 (VITAMIN B-12) 1,000 mcg tablet, Take by mouth daily, Disp: , Rfl:     Review of Systems   HENT: Negative. Eyes: Negative. Respiratory: Negative. Cardiovascular: Negative. Gastrointestinal: Negative. Endocrine: Negative. Musculoskeletal: Negative. Skin: Negative. Allergic/Immunologic: Negative. Neurological: Negative. Hematological: Negative. Psychiatric/Behavioral: Negative. Physical Exam:  Body mass index is 22.79 kg/m². /76 (BP Location: Right arm, Patient Position: Sitting, Cuff Size: Adult)   Pulse 84   Temp 97.6 °F (36.4 °C) (Tympanic)   Ht 5' 2" (1.575 m)   Wt 56.5 kg (124 lb 9.6 oz)   SpO2 98%   BMI 22.79 kg/m²    Vitals:    09/06/23 1059   Weight: 56.5 kg (124 lb 9.6 oz)        Physical Exam  Constitutional:       General: She is not in acute distress. Appearance: She is well-developed. She is not ill-appearing. HENT:      Head: Normocephalic and atraumatic. Nose: Nose normal.      Mouth/Throat:      Pharynx: Oropharynx is clear. Eyes:      Extraocular Movements: Extraocular movements intact. Conjunctiva/sclera: Conjunctivae normal.   Neck:      Thyroid: No thyromegaly. Cardiovascular:      Rate and Rhythm: Normal rate. Pulmonary:      Effort: Pulmonary effort is normal.   Musculoskeletal:         General: No deformity. Cervical back: Normal range of motion. Skin:     Capillary Refill: Capillary refill takes less than 2 seconds. Coloration: Skin is not pale. Findings: No rash. Neurological:      Mental Status: She is alert and oriented to person, place, and time.    Psychiatric:         Behavior: Behavior normal.         Labs:   Lab Results   Component Value Date    HGBA1C 8.5 02/17/2023       No results found for: "WYU4TTAPMXPC", "TSH", "V0YCWWM", "N3YYIAN", "THYROIDAB"    Lab Results   Component Value Date    CREATININE 1.15 08/30/2023    CREATININE 1.01 05/07/2023    CREATININE 0.97 05/07/2023    BUN 15 08/30/2023    K 4.8 08/30/2023    CL 99 08/30/2023    CO2 26 08/30/2023     eGFR   Date Value Ref Range Status   08/30/2023 57 ml/min/1.73sq m Final       Lab Results   Component Value Date    ALT 17 08/30/2023    AST 17 08/30/2023    ALKPHOS 78 08/30/2023       No results found for: "CHOLESTEROL"  No results found for: "HDL"  No results found for: "TRIG"  No results found for: "NONHDLC"      Impression:  1. Type 1 diabetes mellitus with diabetic chronic kidney disease, unspecified CKD stage ([facility])    2. Cystic fibrosis ([facility])         Plan:    Corinne Mckeon was seen today for follow-up and diabetes type 1. Diagnoses and all orders for this visit:    Type 1 diabetes mellitus with diabetic chronic kidney disease, unspecified CKD stage ([address] She is somewhat uncontrolled with GMI of 7.8% for last 2 weeks AGP. There is some significant postprandial hyperglycemia. Today we discussed options and based on her concern for overnight hypoglycemia during fasting, we agreed to transition to basal insulin daily a.m. at a reduced dose and continue with current prandial insulin as listed below. Advised to send Dexcom data in 6 weeks. Follow-up in 3 months with repeat A1c.    -     Semglee, yfgn, 100 UNIT/ML SOPN; Inject 0.06 mL (6 Units total) as directed daily with breakfast 12 units hs  -     Continuous Blood Gluc Sensor (Dexcom G7 Sensor); Use 1 Device every 10 days  -     insulin aspart (NovoLOG FlexPen) 100 UNIT/ML injection pen; Inject 5u before breakfast -7u before lunch - 8u before dinner +1 unit/50 above 150 mg/dL.   Max dose 30 units/day    Humalog Insulin 5 7 8     Regular, Apidra, Humalog orNovolog Sliding Scale:   <80                      151-200 + 1 +1 +1     201-250 +2 +2 +2 +   251-300 +3 +3 +3 +   351-400 +4 +4 +4 +   >401 +5 +5 +5 +         Lantus Insulin  6u              Cystic fibrosis ([address]). This seems to be stable. She follows with her pulmonary physician. Stage 3a chronic kidney disease ([address]). Renal functions are acceptable    Pulmonary hypertension ([address])        I have spent 35 minutes with patient today in which greater than 50% of this time was spent in counseling/coordination of care. Discussed with the patient and all questioned fully answered. She will call me if any problems arise. Educated/ Counseled patient on diagnostic test results, prognosis, risk vs benefit of treatment options, importance of treatment compliance, healthy life and lifestyle choices.       Jorgito

## 2023-09-11 DIAGNOSIS — G89.4 CHRONIC PAIN SYNDROME: ICD-10-CM

## 2023-09-11 DIAGNOSIS — M54.16 LUMBAR RADICULOPATHY: ICD-10-CM

## 2023-09-11 DIAGNOSIS — M79.671 RIGHT FOOT PAIN: ICD-10-CM

## 2023-09-11 DIAGNOSIS — K21.00 GASTROESOPHAGEAL REFLUX DISEASE WITH ESOPHAGITIS WITHOUT HEMORRHAGE: Primary | ICD-10-CM

## 2023-09-11 DIAGNOSIS — J98.4 OTHER DISORDERS OF LUNG: ICD-10-CM

## 2023-09-11 RX ORDER — FAMOTIDINE 40 MG/1
40 TABLET, FILM COATED ORAL
Qty: 90 TABLET | Refills: 3 | Status: SHIPPED | OUTPATIENT
Start: 2023-09-11 | End: 2024-09-05

## 2023-09-11 RX ORDER — DRONABINOL 5 MG/1
5 CAPSULE ORAL 3 TIMES DAILY
Qty: 90 CAPSULE | Refills: 0 | Status: SHIPPED | OUTPATIENT
Start: 2023-09-11

## 2023-09-11 RX ORDER — PREGABALIN 150 MG/1
150 CAPSULE ORAL 2 TIMES DAILY
Qty: 180 CAPSULE | Refills: 1 | Status: SHIPPED | OUTPATIENT
Start: 2023-09-11

## 2023-09-20 DIAGNOSIS — K21.00 GASTROESOPHAGEAL REFLUX DISEASE WITH ESOPHAGITIS WITHOUT HEMORRHAGE: ICD-10-CM

## 2023-09-20 RX ORDER — DEXLANSOPRAZOLE 60 MG/1
CAPSULE, DELAYED RELEASE ORAL
Qty: 30 CAPSULE | Refills: 0 | Status: SHIPPED | OUTPATIENT
Start: 2023-09-20

## 2023-09-20 NOTE — TELEPHONE ENCOUNTER
Reason for call:   [x] Refill   [] Prior Auth  [] Other:     Office:   [] PCP/Provider -   [x] Speciality/Provider - Gastro/ Dr. Nilda Townsend    Medication: Dexilant    Dose/Frequency: 60 mg     Quantity: #30    [facility]    Does the patient have enough for 3 days?    [x] Yes   [] No - Send as HP to POD

## 2023-09-29 ENCOUNTER — OFFICE VISIT (OUTPATIENT)
Dept: GASTROENTEROLOGY | Facility: CLINIC | Age: 48
End: 2023-09-29
Payer: COMMERCIAL

## 2023-09-29 VITALS
BODY MASS INDEX: 22.82 KG/M2 | WEIGHT: 124 LBS | DIASTOLIC BLOOD PRESSURE: 86 MMHG | SYSTOLIC BLOOD PRESSURE: 156 MMHG | HEART RATE: 84 BPM | HEIGHT: 62 IN

## 2023-09-29 DIAGNOSIS — E84.9 CYSTIC FIBROSIS (HCC): ICD-10-CM

## 2023-09-29 DIAGNOSIS — B37.81 CANDIDA ESOPHAGITIS (HCC): ICD-10-CM

## 2023-09-29 DIAGNOSIS — K21.9 GASTROESOPHAGEAL REFLUX DISEASE WITHOUT ESOPHAGITIS: Primary | ICD-10-CM

## 2023-09-29 PROCEDURE — 99214 OFFICE O/P EST MOD 30 MIN: CPT | Performed by: INTERNAL MEDICINE

## 2023-09-29 RX ORDER — OXYCODONE HYDROCHLORIDE 10 MG/1
10 TABLET ORAL EVERY 8 HOURS PRN
Qty: 90 TABLET | Refills: 0 | Status: SHIPPED | OUTPATIENT
Start: 2023-09-29

## 2023-09-29 RX ORDER — ALPRAZOLAM 1 MG/1
1 TABLET ORAL
Qty: 30 TABLET | Refills: 0 | Status: SHIPPED | OUTPATIENT
Start: 2023-09-29

## 2023-09-29 RX ORDER — SUCRALFATE ORAL 1 G/10ML
1 SUSPENSION ORAL 4 TIMES DAILY
Qty: 420 ML | Refills: 1 | Status: SHIPPED | OUTPATIENT
Start: 2023-09-29

## 2023-09-29 RX ORDER — FLUCONAZOLE 100 MG/1
100 TABLET ORAL DAILY
Qty: 10 TABLET | Refills: 0 | Status: SHIPPED | OUTPATIENT
Start: 2023-09-29 | End: 2023-10-09

## 2023-09-29 NOTE — PROGRESS NOTES
[facility] - Outpatient Follow-up Note  Do Benavides 41 y.o. female MRN: [id number]  Encounter: [id number]          ASSESSMENT AND PLAN:      1. Gastroesophageal reflux disease without esophagitis  Severe GERD, s/p TIF, post fundoplication she was doing good and now having again heartburn and regurgitation, her last endoscopy shows there was evidence of Candida esophagitis. She was treated with Diflucan but she is immunocompromised so she is at risk for recurrent candida esophagitis, she is taking Dexilant 60 mg p.o. every morning and famotidine, will add Carafate and again treat empirically with fluconazole 100 mg p.o. daily, if symptoms do not improve then plan for repeat EGD  - sucralfate (CARAFATE) 1 g/10 mL suspension; Take 10 mL (1 g total) by mouth 4 (four) times a day  Dispense: 420 mL; Refill: 1    2. Candida esophagitis (HCC)  History of recurrent severe Candida esophagitis, will again treat with the Diflucan because of ongoing chest pain and burning sensation in the throat. - fluconazole (DIFLUCAN) 100 mg tablet; Take 1 tablet (100 mg total) by mouth daily for 10 days  Dispense: 10 tablet; Refill: 0    3. History of cystic fibrosis, s/p lung transplant, history of chronic pancreatitis-continue with Creon supplement    4. History of diabetes mellitus with sign of gastroparesis-continue with Zofran, currently blood sugar is well controlled    ______________________________________________________________________    SUBJECTIVE: Patient seen and examined, she come for follow-up. She is doing well after TIF procedure but recently started having again heartburn and regurgitation, she also complained about burning sensation in the chest and in the throat.   Her blood sugar is well controlled, she is s/p EGD and colonoscopy in June which shows evidence of Candida esophagitis, colon polyps removed, gastric biopsy also shows evidence of chronic intestinal metaplasia, no dysplasia, she is non-smoker, occasional drink alcohol, she is on daily antibiotic after lung transplant. She was off from [address] [place] [address] Street and all the reflux medication after TIF but now she restarted. She denying any dysphagia or odynophagia, no abdominal pain, no chronic diarrhea      REVIEW OF SYSTEMS IS OTHERWISE NEGATIVE. Historical Information   Past Medical History:   Diagnosis Date   • ABPA (allergic bronchopulmonary aspergillosis) ([address]) 07/08/2012   • Acute kidney injury ([address]) 09/08/2017   • Acute on chronic respiratory failure ([address]) 03/04/2017    Last Assessment & Plan:  Formatting of this note might be different from the original. Recurrent with clinical worsening with enlarging PTX, s/p multiple chest tubes, but new fevers 6/19 and worsening leukocytosis indicating infection as cause of 6/19 decompensation with profound hypoxia in setting of shunting through right lung and continued pneumothorax s/p 2nd chest tube placement. 6/27: Right   • Anaphylaxis 06/06/2022   • Anemia    • Asthma    • Chest pain 09/21/2021   • Chronic pain    • Chronic respiratory failure with hypoxia ([address]) 09/07/2021   • COVID-19 06/03/2022   • Cystic fibrosis ([address])    • Deep vein thrombosis (HCC)    • Diabetes mellitus ([address])    • GERD (gastroesophageal reflux disease)    • HBP (high blood pressure)    • HCAP (healthcare-associated pneumonia) 03/06/2020   • History of transfusion    • Influenza B 02/23/2020   • Kidney stone    • Neutropenic fever ([address]) 09/26/2020   • Pneumonia due to Pseudomonas species ([address]) 10/28/2010    Last Assessment & Plan:  Formatting of this note might be different from the original. Polymicrobial pna w/ pseudomonas, M.  Abscessus, Cadida glabrata/albicans   • PONV (postoperative nausea and vomiting)    • Pseudomonas aeruginosa infection 09/20/2012   • Squamous cell skin cancer 01/12/2023    SCCIS- Left upper thigh   • Transplant recipient     Lung- 2013, 2017   • Ulcerative colitis [facility])    • Wellness examination 02/04/2013    Formatting of this note might be different from the original. Discussed with patient and acknowledgement form signed on 2/4/2013 (ALYSHA)     Past Surgical History:   Procedure Laterality Date   • APPENDECTOMY     • AUGMENTATION MAMMAPLASTY Bilateral 2004   • AUGMENTATION MAMMAPLASTY Right 2021    removed    • BREAST SURGERY      Aug.    • BRONCHOSCOPY     • CHOLECYSTECTOMY     • COLONOSCOPY     • EGD  07/2019    [facility].     • FL RETROGRADE PYELOGRAM  01/09/2020   • FL RETROGRADE PYELOGRAM  02/04/2020   • GASTROSTOMY TUBE, PLACE     • IR BALLOON-OCCLUDED ANTEGRADE TRANSVENOUS OBLITERATION (BATO)  11/22/2022   • IR PORT PLACEMENT  03/22/2022   • IR PORT REMOVAL  01/10/2022   • IR PORT STRIPPING  11/25/2022   • IR TUNNELED CENTRAL LINE PLACEMENT  01/13/2022   • LUNG SURGERY     • LUNG TRANSPLANT      X2   • AL CYSTO BLADDER W/URETERAL CATHETERIZATION Right 01/09/2020    Procedure: CYSTOSCOPY RETROGRADE PYELOGRAM WITH INSERTION STENT URETERAL;  Surgeon: Yancy Connors MD;  Location: [facility];  Service: Urology   • AL CYSTO/URETERO W/LITHOTRIPSY &INDWELL STENT INSRT Right 02/04/2020    Procedure: CYSTOSCOPY URETEROSCOPY WITH LITHOTRIPSY HOLMIUM LASER, RETROGRADE PYELOGRAM AND INSERTION STENT URETERAL;  Surgeon: Yancy Connors MD;  Location: [facility];  Service: Urology   • SKIN BIOPSY     • US GUIDED VASCULAR ACCESS  01/17/2017     Social History   Social History     Substance and Sexual Activity   Alcohol Use Yes   • Alcohol/week: 2.0 standard drinks of alcohol   • Types: 2 Glasses of wine per week    Comment: social     Social History     Substance and Sexual Activity   Drug Use Yes   • Frequency: 2.0 times per week   • Types: Marijuana    Comment: medical edible marijuana prescribed     Social History     Tobacco Use   Smoking Status Former   • Packs/day: 0.50   • Years: 12.00   • Total pack years: 6.00   • Types: Cigarettes   • Start date: 01/1993   • Quit date: 01/2005 • Years since quittin.7   • Passive exposure: Past   Smokeless Tobacco Never     Family History   Problem Relation Age of Onset   • Alcohol abuse Mother    • Mental illness Mother    • Depression Mother    • Alcohol abuse Father    • COPD Maternal Grandmother    • Lung cancer Maternal Grandmother 72   • Cancer Maternal Grandmother    • COPD Maternal Grandfather    • Cancer Maternal Grandfather    • COPD Paternal Grandmother    • Lung cancer Paternal Grandmother 72   • No Known Problems Sister    • No Known Problems Paternal Aunt    • Breast cancer Neg Hx    • Ovarian cancer Neg Hx    • Colon cancer Neg Hx        Meds/Allergies       Current Outpatient Medications:   •  acetaminophen (TYLENOL) 325 mg tablet  •  albuterol (2.5 mg/3 mL) 0.083 % nebulizer solution  •  ALPRAZolam (XANAX) 1 mg tablet  •  apixaban (ELIQUIS) 5 mg  •  atovaquone (MEPRON) 750 mg/5 mL suspension  •  azithromycin (ZITHROMAX) 250 mg tablet  •  BD Pen Needle Abi U/F 32G X 4 MM MISC  •  bisacodyl (DULCOLAX) 5 mg EC tablet  •  Cholecalciferol (Vitamin D3) 125 MCG (5000 UT) TABS  •  Continuous Blood Gluc  (Dexcom G7 ) SUKHI  •  Continuous Blood Gluc Sensor (Dexcom G7 Sensor)  •  dexlansoprazole (DEXILANT) 60 MG capsule  •  dronabinol (MARINOL) 5 MG capsule  •  famotidine (PEPCID) 40 MG tablet  •  fluconazole (DIFLUCAN) 100 mg tablet  •  Hydrocod Edgar-Chlorphe Edgar ER (TUSSIONEX) 10-8 mg/5 mL ER suspension  •  insulin aspart (NovoLOG FlexPen) 100 UNIT/ML injection pen  •  Insulin Pen Needle (Comfort EZ Pen Needles) 33G X 4 MM MISC  •  naloxone (NARCAN) 4 mg/0.1 mL nasal spray  •  oxyCODONE (ROXICODONE) 10 MG TABS  •  pancrelipase, Lip-Prot-Amyl, (CREON) 24,000 units  •  polyethylene glycol (MIRALAX) 17 g packet  •  predniSONE 10 mg tablet  •  pregabalin (LYRICA) 150 mg capsule  •  Respiratory Therapy Supplies (Nebulizer) SUKHI  •  Semglee, yfgn, 100 UNIT/ML SOPN  •  sucralfate (CARAFATE) 1 g/10 mL suspension  •  tacrolimus (PROGRAF) 0.5 mg capsule  •  tacrolimus (PROGRAF) 1 mg capsule  •  valACYclovir (VALTREX) 1,000 mg tablet  •  valACYclovir (VALTREX) 500 mg tablet  •  vitamin B-12 (VITAMIN B-12) 1,000 mcg tablet    Allergies   Allergen Reactions   • Bebtelovimab Anaphylaxis   • Vancomycin Angioedema and Hives   • Gabapentin Hallucinations   • Ceftazidime Headache     Severe headaches after desensitization   • Nsaids Other (See Comments)     Lung transplant increases risk of renal toxicity, call lung txp provider to discuss if needed           Objective     Blood pressure 156/86, pulse 84, height 5' 2" (1.575 m), weight 56.2 kg (124 lb), not currently breastfeeding. Body mass index is 22.68 kg/m². PHYSICAL EXAM:      General Appearance:   Alert, cooperative, no distress   HEENT:   Normocephalic, atraumatic, anicteric.     Neck:  Supple, symmetrical, trachea midline   Lungs:   Clear to auscultation bilaterally; no rales, rhonchi or wheezing; respirations unlabored    Heart[de-identified]   Regular rate and rhythm; no murmur, rub, or gallop. Abdomen:   Soft, non-tender, non-distended; normal bowel sounds; no masses, no organomegaly    Genitalia:   Deferred    Rectal:   Deferred    Extremities:  No cyanosis, clubbing or edema    Pulses:  2+ and symmetric    Skin:  No jaundice, rashes, or lesions    Lymph nodes:  No palpable cervical lymphadenopathy        Lab Results:   No visits with results within 1 Day(s) from this visit.    Latest known visit with results is:   Admission on 08/30/2023, Discharged on 08/30/2023   Component Date Value   • Ventricular Rate 08/30/2023 89    • Atrial Rate 08/30/2023 89    • DC Interval 08/30/2023 146    • QRSD Interval 08/30/2023 72    • QT Interval 08/30/2023 348    • QTC Interval 08/30/2023 423    • P Axis 08/30/2023 61    • QRS Axis 08/30/2023 52    • T Wave Axis 08/30/2023 49    • Sodium 08/30/2023 132 (L)    • Potassium 08/30/2023 4.8    • Chloride 08/30/2023 99    • CO2 08/30/2023 26    • ANION GAP 08/30/2023 7    • BUN 08/30/2023 15    • Creatinine 08/30/2023 1.15    • Glucose 08/30/2023 236 (H)    • Calcium 08/30/2023 9.9    • AST 08/30/2023 17    • ALT 08/30/2023 17    • Alkaline Phosphatase 08/30/2023 78    • Total Protein 08/30/2023 7.7    • Albumin 08/30/2023 4.0    • Total Bilirubin 08/30/2023 0.28    • eGFR 08/30/2023 57    • WBC 08/30/2023 8.95    • RBC 08/30/2023 4.23    • Hemoglobin 08/30/2023 9.3 (L)    • Hematocrit 08/30/2023 32.1 (L)    • MCV 08/30/2023 76 (L)    • MCH 08/30/2023 22.0 (L)    • MCHC 08/30/2023 29.0 (L)    • RDW 08/30/2023 17.5 (H)    • MPV 08/30/2023 10.3    • Platelets 03/22/8554 169    • nRBC 08/30/2023 0    • Neutrophils Relative 08/30/2023 81 (H)    • Immat GRANS % 08/30/2023 0    • Lymphocytes Relative 08/30/2023 13 (L)    • Monocytes Relative 08/30/2023 5    • Eosinophils Relative 08/30/2023 1    • Basophils Relative 08/30/2023 0    • Neutrophils Absolute 08/30/2023 7.25    • Immature Grans Absolute 08/30/2023 0.02    • Lymphocytes Absolute 08/30/2023 1.15    • Monocytes Absolute 08/30/2023 0.45    • Eosinophils Absolute 08/30/2023 0.06    • Basophils Absolute 08/30/2023 0.02    • Blood Culture 08/30/2023 No Growth After 5 Days. • Blood Culture 08/30/2023 No Growth After 5 Days. • SARS-CoV-2 08/30/2023 Negative    • INFLUENZA A PCR 08/30/2023 Negative    • INFLUENZA B PCR 08/30/2023 Negative    • RSV PCR 08/30/2023 Negative    • Preg, Serum 08/30/2023 Negative    • hs TnI 0hr 08/30/2023 4    • BNP 08/30/2023 380 (H)    • LACTIC ACID 08/30/2023 1.9    • Protime 08/30/2023 16.1 (H)    • INR 08/30/2023 1.23 (H)    • PTT 08/30/2023 32    • hs TnI 2hr 08/30/2023 4    • Delta 2hr hsTnI 08/30/2023 0          Radiology Results:   XR chest pa & lateral    Result Date: 9/14/2023  Narrative: PROCEDURE: Single AP view of the chest obtained at bedside. . COMPARISON:  chest radiograph 7/23/2023 , 11/10/2022, 9/14/2022 FINDINGS: Tubes, lines, surgical material: [id number] catheter with tip terminating in the right atrium.   Cardiovascular and mediastinum: The heart size is normal. The mediastinum appears stable. Lungs and pleura:   Status post right-sided pneumonectomy with expected complete opacification of the left hemithorax. There has been gradual ipsilateral mediastinal shift (compared to 9/14/2022 and 11/10/2022 chest x-rays) with progressive narrowing of the trachea suggestive of postpneumonectomy syndrome. Status post left lung transplantation. No focal consolidation of the left lung. No sizable left pleural effusion or pneumothorax. Impression: Gradual rightward mediastinal shift with progressive narrowing of the trachea suggestive of postpneumonectomy syndrome. ATTENDING RADIOLOGIST ADDITIONS [[id number]]: I have personally reviewed the images and agree with the report, with the following additions: Unchanged mediastinal shift towards the right after right pneumonectomy. CTA ED chest PE study    Result Date: 8/30/2023  Narrative: CTA - CHEST WITH IV CONTRAST - PULMONARY ANGIOGRAM INDICATION:   sob, prior pe. Per my review of the medical record, history of cystic fibrosis with right pneumonectomy and left lung transplant. COMPARISON: CXR from today, chest CT 5/20/2023. TECHNIQUE: CT angiogram timed for optimal opacification of the pulmonary arteries. Axial, sagittal, and coronal 2D reformats created from source data. Coronal 3D MIP postprocessing on the acquisition scanner. Radiation dose length product (DLP):  167 mGy-cm . Radiation dose exposure minimized using iterative reconstruction and automated exposure control. IV Contrast:  85 mL of iohexol (OMNIPAQUE) FINDINGS: PULMONARY ARTERIES:  No pulmonary embolus. LUNGS: Right pneumonectomy. Left lung transplant. No acute pulmonary disease with a few stable 5 mm left lung nodules. AIRWAYS: No significant filling defects. PLEURA: Stable trace loculated right pleural effusion. HEART/GREAT VESSELS: Normal heart size.  MEDIASTINUM AND BRUCE: Right port at cavoatrial junction. Mediastinal shift into the right hemithorax due to right pneumonectomy. Persistent dilated fluid-filled esophagus. CHEST WALL AND LOWER NECK: Left breast implant. UPPER ABDOMEN:  Unremarkable. OSSEOUS STRUCTURES: Mild degenerative disease in the spine. Median sternotomy. Surgical defects of the right rib     Impression: No pulmonary embolus. Left lung transplant with no acute pulmonary disease. Workstation performed: [id number]     XR chest 1 view portable    Result Date: 8/30/2023  Narrative: CHEST INDICATION:   cp. COMPARISON: 5/23/2023 EXAM PERFORMED/VIEWS:  XR CHEST PORTABLE FINDINGS: Cardiac size evaluation restricted by right pneumonectomy and right midline shift. Right port remains. Left lung is clear. Median sternotomy. No pneumothorax or pleural effusion. Osseous structures appear within normal limits for patient age.      Impression: Right pneumonectomy Clear left lung Workstation performed: [id number]

## 2023-10-02 DIAGNOSIS — D64.9 ACUTE ON CHRONIC ANEMIA: Primary | ICD-10-CM

## 2023-10-02 RX ORDER — SODIUM CHLORIDE 9 MG/ML
20 INJECTION, SOLUTION INTRAVENOUS ONCE
OUTPATIENT
Start: 2023-10-02

## 2023-10-17 DIAGNOSIS — K21.00 GASTROESOPHAGEAL REFLUX DISEASE WITH ESOPHAGITIS WITHOUT HEMORRHAGE: ICD-10-CM

## 2023-10-17 RX ORDER — DEXLANSOPRAZOLE 60 MG/1
CAPSULE, DELAYED RELEASE ORAL
Qty: 30 CAPSULE | Refills: 5 | Status: SHIPPED | OUTPATIENT
Start: 2023-10-17 | End: 2023-10-20 | Stop reason: SDUPTHER

## 2023-10-20 DIAGNOSIS — K59.00 CONSTIPATION, UNSPECIFIED CONSTIPATION TYPE: Primary | ICD-10-CM

## 2023-10-20 DIAGNOSIS — K21.00 GASTROESOPHAGEAL REFLUX DISEASE WITH ESOPHAGITIS WITHOUT HEMORRHAGE: ICD-10-CM

## 2023-10-20 RX ORDER — LINACLOTIDE 145 UG/1
145 CAPSULE, GELATIN COATED ORAL DAILY
Qty: 30 CAPSULE | Refills: 0 | Status: SHIPPED | OUTPATIENT
Start: 2023-10-20 | End: 2023-11-19

## 2023-10-20 RX ORDER — DEXLANSOPRAZOLE 60 MG/1
CAPSULE, DELAYED RELEASE ORAL
Qty: 30 CAPSULE | Refills: 5 | Status: SHIPPED | OUTPATIENT
Start: 2023-10-20

## 2023-11-01 DIAGNOSIS — E84.9 CYSTIC FIBROSIS (HCC): ICD-10-CM

## 2023-11-01 NOTE — TELEPHONE ENCOUNTER
Patient contacted our office for refill of oxycodone and also to let us know that infusion center may be reaching out to get permission to access this patient portacath. Just fyi and refill for med is attached.  Thank you

## 2023-11-02 RX ORDER — OXYCODONE HYDROCHLORIDE 10 MG/1
10 TABLET ORAL EVERY 8 HOURS PRN
Qty: 90 TABLET | Refills: 0 | Status: SHIPPED | OUTPATIENT
Start: 2023-11-02

## 2023-11-06 ENCOUNTER — TELEPHONE (OUTPATIENT)
Dept: INTERNAL MEDICINE CLINIC | Facility: CLINIC | Age: 48
End: 2023-11-06

## 2023-11-06 NOTE — TELEPHONE ENCOUNTER
I received notification from Intern that we need to complete an authorization for this patient to received her Venofer.  The authorization has been submitted by fax to [phone] is a my desk at [facility] and I have also scanned to the chart

## 2023-11-07 ENCOUNTER — TELEPHONE (OUTPATIENT)
Dept: INTERNAL MEDICINE CLINIC | Facility: CLINIC | Age: 48
End: 2023-11-07

## 2023-11-07 NOTE — TELEPHONE ENCOUNTER
Auth submitted for Venofer on 11/6 called 11/7 to try to follow up unable to reach anyone Awaiting insurance authorization .

## 2023-11-09 ENCOUNTER — HOSPITAL ENCOUNTER (OUTPATIENT)
Dept: INFUSION CENTER | Facility: HOSPITAL | Age: 48
Discharge: HOME | End: 2023-11-09

## 2023-11-14 NOTE — TELEPHONE ENCOUNTER
I have noted a request for additional information for authorization of Venofer and I have submitted to the fax number provided [phone]. [employer] [place]. I have informed the patient that authorization not yet complete and as of the time of the call to the patient I did not have any communication from the insurance. Additional info submitted. Will await authorization.

## 2023-11-16 ENCOUNTER — OFFICE VISIT (OUTPATIENT)
Dept: DERMATOLOGY | Facility: CLINIC | Age: 48
End: 2023-11-16

## 2023-11-16 VITALS — TEMPERATURE: 97.8 F | BODY MASS INDEX: 21.95 KG/M2 | WEIGHT: 120 LBS

## 2023-11-16 DIAGNOSIS — C44.519 BASAL CELL CARCINOMA OF SKIN OF TRUNK, EXCEPT SCROTUM: Primary | ICD-10-CM

## 2023-11-16 PROCEDURE — 88305 TISSUE EXAM BY PATHOLOGIST: CPT | Performed by: STUDENT IN AN ORGANIZED HEALTH CARE EDUCATION/TRAINING PROGRAM

## 2023-11-16 NOTE — PROGRESS NOTES
[facility] Dermatology Clinic Note     Patient Name: Jayla Mccarthy  Encounter Date: 11/16/23     Have you been cared for by a [facility] Dermatologist in the last 3 years and, if so, which description applies to you? Yes. I have been here within the last 3 years, and my medical history has NOT changed since that time. I am FEMALE/of child-bearing potential.    REVIEW OF SYSTEMS:  Have you recently had or currently have any of the following? No changes in my recent health. PAST MEDICAL HISTORY:  Have you personally ever had or currently have any of the following? If "YES," then please provide more detail. No changes in my medical history. HISTORY OF IMMUNOSUPPRESSION: Do you have a history of any of the following:  Systemic Immunosuppression such as Diabetes, Biologic or Immunotherapy, Chemotherapy, Organ Transplantation, Bone Marrow Transplantation? No     Answering "YES" requires the addition of the dotphrase "IMMUNOSUPPRESSED" as the first diagnosis of the patient's visit. FAMILY HISTORY:  Any "first degree relatives" (parent, brother, sister, or child) with the following? No changes in my family's known health. PATIENT EXPERIENCE:    Do you want the Dermatologist to perform a COMPLETE skin exam today including a clinical examination under the "bra and underwear" areas? NO  If necessary, do we have your permission to call and leave a detailed message on your Preferred Phone number that includes your specific medical information?   Yes      Allergies   Allergen Reactions    Bebtelovimab Anaphylaxis    Vancomycin Angioedema and Hives    Gabapentin Hallucinations    Ceftazidime Headache     Severe headaches after desensitization    Nsaids Other (See Comments)     Lung transplant increases risk of renal toxicity, call lung txp provider to discuss if needed      Current Outpatient Medications:     acetaminophen (TYLENOL) 325 mg tablet, Take 3 tablets (975 mg total) by mouth every 8 (eight) hours, Disp: , Rfl: 0    albuterol (2.5 mg/3 mL) 0.083 % nebulizer solution, Take 6 mL (5 mg total) by nebulization 2 (two) times a day, Disp: , Rfl: 0    ALPRAZolam (XANAX) 1 mg tablet, Take 1 tablet (1 mg total) by mouth daily at bedtime as needed for anxiety or sleep, Disp: 30 tablet, Rfl: 0    apixaban (ELIQUIS) 5 mg, Take 1 tablet (5 mg total) by mouth 2 (two) times a day, Disp: 30 tablet, Rfl: 0    atovaquone (MEPRON) 750 mg/5 mL suspension, Take 1,500 mg by mouth daily, Disp: , Rfl:     azithromycin (ZITHROMAX) 250 mg tablet, Take 250 mg by mouth 3 (three) times a week, Disp: , Rfl:     BD Pen Needle Abi U/F 32G X 4 MM MISC, 4 times daily, Disp: 400 each, Rfl: 1    bisacodyl (DULCOLAX) 5 mg EC tablet, Take 1 tablet (5 mg total) by mouth daily at bedtime, Disp: 30 tablet, Rfl: 0    Cholecalciferol (Vitamin D3) 125 MCG (5000 UT) TABS, Take 5,000 Units by mouth daily, Disp: , Rfl:     Continuous Blood Gluc  (Dexcom G7 ) SUKHI, Use 1 each continuous, Disp: 1 each, Rfl: 0    Continuous Blood Gluc Sensor (Dexcom G7 Sensor), Use 1 Device every 10 days, Disp: 3 each, Rfl: 5    dexlansoprazole (DEXILANT) 60 MG capsule, Take 1 capsule every morning, Disp: 30 capsule, Rfl: 5    dronabinol (MARINOL) 5 MG capsule, Take 1 capsule (5 mg total) by mouth 3 (three) times a day, Disp: 90 capsule, Rfl: 0    famotidine (PEPCID) 40 MG tablet, Take 1 tablet (40 mg total) by mouth daily at bedtime, Disp: 90 tablet, Rfl: 3    Hydrocod Edgar-Chlorphe Edgar ER (TUSSIONEX) 10-8 mg/5 mL ER suspension, Take 5 mL by mouth every 12 (twelve) hours as needed for cough Max Daily Amount: 10 mL, Disp: 70 mL, Rfl: 0    insulin aspart (NovoLOG FlexPen) 100 UNIT/ML injection pen, Inject 5u before breakfast -7u before lunch - 8u before dinner +1 unit/50 above 150 mg/dL.   Max dose 30 units/day, Disp: 15 mL, Rfl: 3    Insulin Pen Needle (Comfort EZ Pen Needles) 33G X 4 MM MISC, Use to inject insulin 4 times a day, Disp: 100 each, Rfl: 3    linaCLOtide (Linzess) 145 MCG CAPS, Take 1 capsule (145 mcg total) by mouth daily, Disp: 30 capsule, Rfl: 0    naloxone (NARCAN) 4 mg/0.1 mL nasal spray, 4 mg into each nostril, Disp: , Rfl:     oxyCODONE (ROXICODONE) 10 MG TABS, Take 1 tablet (10 mg total) by mouth every 8 (eight) hours as needed for severe pain Max Daily Amount: 30 mg, Disp: 90 tablet, Rfl: 0    pancrelipase, Lip-Prot-Amyl, (CREON) 24,000 units, Take 48,000 Units by mouth 3 (three) times a day with meals, Disp: , Rfl:     polyethylene glycol (MIRALAX) 17 g packet, Take 17 g by mouth 2 (two) times a day, Disp: , Rfl: 0    predniSONE 10 mg tablet, Take 10 mg by mouth daily, Disp: , Rfl:     pregabalin (LYRICA) 150 mg capsule, Take 1 capsule (150 mg total) by mouth 2 (two) times a day, Disp: 180 capsule, Rfl: 1    Respiratory Therapy Supplies (Nebulizer) SUKHI, Please dispense nebulizer machine, Disp: , Rfl:     Semglee, yfgn, 100 UNIT/ML SOPN, Inject 0.06 mL (6 Units total) as directed daily with breakfast 12 units hs, Disp: 3 mL, Rfl: 1    sucralfate (CARAFATE) 1 g/10 mL suspension, Take 10 mL (1 g total) by mouth 4 (four) times a day, Disp: 420 mL, Rfl: 1    tacrolimus (PROGRAF) 0.5 mg capsule, , Disp: , Rfl:     tacrolimus (PROGRAF) 1 mg capsule, Take 1 mg by mouth 2 (two) times a day Take 2 tablets and a 0.5 table to total 2.5 mg twice a day 5mg total daily, Disp: , Rfl:     valACYclovir (VALTREX) 1,000 mg tablet, , Disp: , Rfl:     valACYclovir (VALTREX) 500 mg tablet, Take 500 mg by mouth daily, Disp: , Rfl:     vitamin B-12 (VITAMIN B-12) 1,000 mcg tablet, Take by mouth daily, Disp: , Rfl:           Whom besides the patient is providing clinical information about today's encounter?    NO ADDITIONAL HISTORIAN (patient alone provided history)    Physical Exam and Assessment/Plan by Diagnosis:      PROCEDURE:  EXCISION WITH INTERMEDIATE LAYERED CLOSURE     Attending: Denny Powell  Assistant:  Barbara Holland, [facility]    Pre-Op Diagnosis: Bael Cell Carcinoma, nodular   Post-Op Diagnosis: Same   Benign versus Malignant Malignant     Collected 7/6/2023  2:42 PM       Status: Final result       Visible to patient: Yes (seen)       Dx: Neoplasm of uncertain behavior of skin    1 Result Note       2 Follow-up Encounters      Component    Case Report   Surgical Pathology Report                         Case: [id number]                                   Authorizing Provider: Neil Graham MD     Collected:           07/06/2023 1442               Ordering Location:     [facility]      Received:            07/06/2023 [address] [place] [address]                                                                 Pathologist:           Neil Graham MD                                                       Specimen:    Skin, Other, A: Central chest                                                              Final Diagnosis   A. Skin, Central chest, Shave biopsy:  BASAL CELL CARCINOMA, NODULAR/PIGMENTED TYPE. Electronically signed by Neil Graham MD on 7/12/2023 at 10:02 AM            Lesion Anatomic Location: Central Chest  (Previous Accession Number: [id number])  Pre-op size: 0.6cm  Size of defect:  1.4 (with 0.4 centimeter margins)  Final repaired wound length:  4.2cm    Written and verbal, witnessed informed consent was obtained. I discussed that excision is a method of removing lesions both benign and malignant lesions. A portion of normal skin is often taken to ensure completeness of removal.  I reviewed that procedure will include numbing the area,  cutting around and under defect, undermining tissue, and closing the wound with sutures both inside and out. These sutures are usually removed in 7 to 14 days. Risks (bleeding, pain, infection, scarring, recurrence) and benefits discussed.  It was discussed with patient that every effort is made to minimize scar, but scarring is influenced also by extrinsic factor such as location, age and genetics. Time Out: performed:  yes  Correct patient: yes. Correct site per Clinic Report: yes  Correct site per Patient Report: yes    LOCAL ANESTHESIA: 1% xylocaine with epi     DESCRIPTION OF PROCEDURE: The patient was brought back into the procedure room, anesthetized locally, prepped and draped in the usual fashion. Using a #15 blade with a scalpel, the lesion was excised in elliptical fashion. The wound was  undermined in the  fascial plane. Hemostasis was achieved with light electrocoagulation. Purpose of undermining was to decrease wound tension and facilitate closure. The wound was closed with subcutaneous sutures as follows:    Deep suture:4-0 Vicryl      Epidermal edge closure was accomplished with superficial sutures as follows:    Superficial suture: 4-0 Prolene  Superficial suture type: Running    Estimated blood loss less than 3ml. The patient tolerated the procedure well without any complications. The wound was cleaned with sterile saline, dried off, surgical vaseline ointment was applied, and the wound was covered. A pressure dressing was applied for stabilization and light pressure. The patient was given detailed oral and written instructions on postoperative care as detailed in consent. The patient left in good medical condition. POSTOP DISCUSSION DISCUSSION AND INSTRUCTIONS FOR PATIENT      Rationale for Procedure  A skin excision allows the dermatologist to remove a lesion. The procedure involves a local numbing medication and removing the entire lesion. Typically, the lesion is being removed because it is atypical, traumatized, or for significant appearance reasons. The area will be open like a brush burn and allowed to heal.   There will be no sutures. Tissue is sent to pathologist who will reconfirm diagnosis and verify completeness of lesion removal.    Description of Procedure  We would like to perform a skin excision today.   A local anesthetic, similar to the kind that a dentist uses when filling a cavity, will be injected with a very small needle into the skin area to be sampled. The injected skin and tissue underneath should “go to sleep” and become numbed so that no further pain should be felt. A scalpel will be used to cut around the lesion and tissue will be submitted to pathology for examination. Depending on the diagnosis the lesion will be excised with a certain amount of normal skin to help assure completeness of lesion removal.  The physician will discuss in advance the anticipated size and extent of removal.   Bleeding will occur, but it will stopped with direct pressure, sutures, and electrocautery. Surgical “Vaseline-type” ointment will also applied after the procedure to help create a barrier between the wound and the outside world. Risks and Potential Complications  The advantage of a skin excision is that it allows us to remove a problem lesion quickly. Although this usually permits the lesion to heal as soon as possible with the least scarring, there are some risks and potential complications that include but are not limited to the following:  Some bleeding is normal at time of procedure and some bleeding on gauze is normal  the first few days after surgery. Profuse bleeding and bleeding with swelling and pain should be reported as detailed  below  Infection is uncommon in skin surgery. Infection should be reported and is indicated by pain, redness, and discharge of purulent material.  Some dull to at time sharp pain could occur initially the day after surgery. Persistent pain or increasing pain days after surgery is not expected and should be reported. Every effort is made to minimize scar, but location, size, and genetics do play a role in scar appearance. A surgical wound does not achieve its optimal appearance until 6 months.   There are several treatments available if scarring would be problematic including scar creams, silicone pad, laser and scar revision. Skin discoloration can occur especially in people of color. Its important to avoid sun on wound in first 6 months after surgery. Treatment is available if pigment is problematic. Incomplete removal of the lesion or recurrence of lesion can occur and this would then require further treatment and more surgery. Nerve Damage/Numbness/Loss of Function is very rare, but is most likely to occur if lesion is large or if it is in a high risk location  Allergic Reaction to lidocaine is rare. More commonly,  epinephrine is used  with the lidocaine. Occasionally, epinephrine (aka adrenalin) may cause a brief  feeling of anxiety or jitteriness. The person at the microscope  (pathologist) may provide additional information that was unexpected. This unexpected finding could provoke the need for additional treatment or evaluation. What You Will Need to Do After the Procedure  Keep the area clean and dry the first day. Try NOT to remove the bandage for the first day. Gently clean the area with soap and water and apply Vaseline ointment (this is over the counter and not a prescription) to the excision  site for up to 2 weeks. Apply a clean appropriately sized bandage to area. Gauze and paper tape are recommended for sensitive skin. Return for suture removal as instructed (generally 1 week for the face, 2 weeks for the body). Take Acetaminophen (Tylenol) for discomfort, if no contraindications. Do NOT take Ibuprofen or aspirin unless specifically told to do so by your Dermatologist because these medications can make bleeding worse. Call our office immediately for signs of infection: fever, chills, increased redness, warmth, tenderness, discomfort/pain, or pus or foul smell coming from the wound. If bleeding is noticed, place a clean cloth over the area and apply firm pressure for thirty minutes.   Check the wound ONLY after 30 minutes of direct pressure; do not cheat and sneak a peak, as that does not count. If bleeding persists after 30 minutes of legitimate direct pressure, then try one more round of direct pressure for an additional 10 minutes to the area. Should the bleeding become heavier or not stop after the second attempt, call [facility] directly at [phone] (SKIN) or, if after hours, go to your local Emergency Room/Emergency Department. Patient advised to avoid heavy lifting, exercise, etc for the next 2 weeks.        Scribe Attestation      I,:  [employer] am acting as a scribe while in the presence of the attending physician.:       I,:  Vaughan Ahumada, MD personally performed the services described in this documentation    as scribed in my presence.:

## 2023-11-16 NOTE — PATIENT INSTRUCTIONS
POSTOP DISCUSSION DISCUSSION AND INSTRUCTIONS FOR PATIENT      Rationale for Procedure  A skin excision allows the dermatologist to remove a lesion. The procedure involves a local numbing medication and removing the entire lesion. Typically, the lesion is being removed because it is atypical, traumatized, or for significant appearance reasons. The area will be open like a brush burn and allowed to heal.   There will be no sutures. Tissue is sent to pathologist who will reconfirm diagnosis and verify completeness of lesion removal.    Description of Procedure  We would like to perform a skin excision today. A local anesthetic, similar to the kind that a dentist uses when filling a cavity, will be injected with a very small needle into the skin area to be sampled. The injected skin and tissue underneath should “go to sleep” and become numbed so that no further pain should be felt. A scalpel will be used to cut around the lesion and tissue will be submitted to pathology for examination. Depending on the diagnosis the lesion will be excised with a certain amount of normal skin to help assure completeness of lesion removal.  The physician will discuss in advance the anticipated size and extent of removal.   Bleeding will occur, but it will stopped with direct pressure, sutures, and electrocautery. Surgical “Vaseline-type” ointment will also applied after the procedure to help create a barrier between the wound and the outside world. Risks and Potential Complications  The advantage of a skin excision is that it allows us to remove a problem lesion quickly. Although this usually permits the lesion to heal as soon as possible with the least scarring, there are some risks and potential complications that include but are not limited to the following:  Some bleeding is normal at time of procedure and some bleeding on gauze is normal  the first few days after surgery.   Profuse bleeding and bleeding with swelling and pain should be reported as detailed  below  Infection is uncommon in skin surgery. Infection should be reported and is indicated by pain, redness, and discharge of purulent material.  Some dull to at time sharp pain could occur initially the day after surgery. Persistent pain or increasing pain days after surgery is not expected and should be reported. Every effort is made to minimize scar, but location, size, and genetics do play a role in scar appearance. A surgical wound does not achieve its optimal appearance until 6 months. There are several treatments available if scarring would be problematic including scar creams, silicone pad, laser and scar revision. Skin discoloration can occur especially in people of color. Its important to avoid sun on wound in first 6 months after surgery. Treatment is available if pigment is problematic. Incomplete removal of the lesion or recurrence of lesion can occur and this would then require further treatment and more surgery. Nerve Damage/Numbness/Loss of Function is very rare, but is most likely to occur if lesion is large or if it is in a high risk location  Allergic Reaction to lidocaine is rare. More commonly,  epinephrine is used  with the lidocaine. Occasionally, epinephrine (aka adrenalin) may cause a brief  feeling of anxiety or jitteriness. The person at the microscope  (pathologist) may provide additional information that was unexpected. This unexpected finding could provoke the need for additional treatment or evaluation. What You Will Need to Do After the Procedure  Keep the area clean and dry the first day. Try NOT to remove the bandage for the first day. Gently clean the area with soap and water and apply Vaseline ointment (this is over the counter and not a prescription) to the excision  site for up to 2 weeks. Apply a clean appropriately sized bandage to area. Gauze and paper tape are recommended for sensitive skin.   Return for suture removal as instructed (generally 1 week for the face, 2 weeks for the body). Take Acetaminophen (Tylenol) for discomfort, if no contraindications. Do NOT take Ibuprofen or aspirin unless specifically told to do so by your Dermatologist because these medications can make bleeding worse. Call our office immediately for signs of infection: fever, chills, increased redness, warmth, tenderness, discomfort/pain, or pus or foul smell coming from the wound. If bleeding is noticed, place a clean cloth over the area and apply firm pressure for thirty minutes. Check the wound ONLY after 30 minutes of direct pressure; do not cheat and sneak a peak, as that does not count. If bleeding persists after 30 minutes of legitimate direct pressure, then try one more round of direct pressure for an additional 10 minutes to the area. Should the bleeding become heavier or not stop after the second attempt, call [facility]atology directly at [phone] (SKIN) or, if after hours, go to your local Emergency Room/Emergency Department.

## 2023-11-20 PROCEDURE — 88305 TISSUE EXAM BY PATHOLOGIST: CPT | Performed by: STUDENT IN AN ORGANIZED HEALTH CARE EDUCATION/TRAINING PROGRAM

## 2023-11-21 NOTE — RESULT ENCOUNTER NOTE
Called and informed patient of excision results reviewed by Dr. Diomedes Lockhart which identified clear BCC margins. Patient states wound is healing well. She has no other questions or concerns. Patient will follow up as scheduled.

## 2023-11-21 NOTE — RESULT ENCOUNTER NOTE
Theodore- please let patient know BCC excision margins were clear. Thanks! DERMATOPATHOLOGY RESULT NOTE    Results reviewed by ordering physician. Instructions for Clinical Derm Team:   (remember to route Result Note to appropriate staff):    None    Result & Plan by Specimen:    Specimen A: BCC excision margins clear  Plan: monitor       Result Notes     Component   Case Report  Surgical Pathology Report                         Case: [id number]                                  Authorizing Provider: Angella Mclaughlin MD     Collected:           11/16/2023 1122              Ordering Location:     [facility]      Received:            11/16/2023 [address] [place] [address]                                                                      Pathologist:           Ann Shaffer MD                                                          Specimen:    Skin, Other, A. Central chest                                                            Final Diagnosis  A. Skin, central chest, excision:    -Prior procedure site changes present.    -Residual basal cell carcinoma not seen. Electronically signed by Ann Shaffer MD on 11/20/2023 at  4:04 PM  Additional Information   All reported additional testing was performed with appropriately reactive controls. These tests were developed and their performance characteristics determined by [facility] or appropriate performing facility, though some tests may be performed on tissues which have not been validated for performance characteristics (such as staining performed on alcohol exposed cell blocks and decalcified tissues). Results should be interpreted with caution and in the context of the patients' clinical condition. These tests may not be cleared or approved by the U.S. Food and Drug Administration, though the FDA has determined that such clearance or approval is not necessary.  These tests are used for clinical purposes and they should not be regarded as investigational or for research. This laboratory has been approved by [place] 88, designated as a high-complexity laboratory and is qualified to perform these tests. Yasmani Villafana Description   A. The specimen is received in formalin, labeled with the patient's name and hospital number, and is designated " skin central chest". It consists of an unoriented 2.9 x 1.0 cm skin ellipse excised to a depth of 0.3 cm. The tan-brown wrinkled epidermis displays an ill-defined well-healed 0.5 x 0.5 cm white scar 0.3 cm from the closest skin margin. The resection margin is inked green and the skin tips are inked red. Sectioning reveals tan-yellow cut surfaces. Representative sections are submitted as follows:    A1: Opposing skin tips, en face  A2: Uninvolved sections between skin tip and scar  A3: Scar  A4: Uninvolved sections between scar and opposing skin tip    Note: The estimated total formalin fixation time based upon information provided by the submitting clinician and the standard processing schedule is under 72 hours.   Taco

## 2023-11-27 ENCOUNTER — OFFICE VISIT (OUTPATIENT)
Dept: DERMATOLOGY | Facility: CLINIC | Age: 48
End: 2023-11-27

## 2023-11-27 DIAGNOSIS — Z48.02 ENCOUNTER FOR REMOVAL OF SUTURES: Primary | ICD-10-CM

## 2023-11-27 PROCEDURE — RECHECK: Performed by: DERMATOLOGY

## 2023-11-27 NOTE — PROGRESS NOTES
Suture removal    Date/Time: 11/27/2023 10:30 AM    Performed by: Jose Antonio Moeller RN  Authorized by: Frandy Rain MD  Universal Protocol:  Consent: Verbal consent obtained. Written consent not obtained. Risks and benefits: risks, benefits and alternatives were discussed  Consent given by: patient  Time out: Immediately prior to procedure a "time out" was called to verify the correct patient, procedure, equipment, support staff and site/side marked as required. Timeout called at: 11/27/2023 10:31 AM.  Patient understanding: patient states understanding of the procedure being performed  Patient consent: the patient's understanding of the procedure matches consent given  Procedure consent: procedure consent matches procedure scheduled  Relevant documents: relevant documents present and verified  Test results: test results available and properly labeled  Site marked: the operative site was marked  Radiology Images displayed and confirmed. If images not available, report reviewed: imaging studies not available  Patient identity confirmed: verbally with patient      Patient location:  Clinic  Location:     Location:  Trunk    Trunk location:  Chest  Procedure details: Tools used:  Suture removal kit    Wound appearance:  No sign(s) of infection, good wound healing, nontender and pink    Sutures removed: running suture. Post-procedure details:     Post-removal:  Steri-Strips applied    Patient tolerance of procedure: Tolerated well, no immediate complications  Comments:      Patient instructed to follow up in 6 months for a full body skin exam. Pt scheduled 1/25/2024. POST-SURGERY SCAR CARE    We advise you start silicone scar gel to scar lines with firm massage to the scar after 2 weeks. [employer] brand makes a gel that you can buy and is available over the counter. You can find this in the band-aid aisle. Please remember to use sunscreen daily after your wound has healed.  We recommend a broad spectrum sunscreen with SPF of at least 30 and sun protective clothing, shade, etc.

## 2023-11-27 NOTE — PATIENT INSTRUCTIONS
Patient instructed to follow up in 6 months for a full body skin exam. Pt scheduled 1/25/2024. POST-SURGERY SCAR CARE    We advise you start silicone scar gel to scar lines with firm massage to the scar after 2 weeks. [employer] Away brand makes a gel that you can buy and is available over the counter. You can find this in the band-aid aisle. Please remember to use sunscreen daily after your wound has healed.  We recommend a broad spectrum sunscreen with SPF of at least 30 and sun protective clothing, shade, etc.

## 2023-11-28 ENCOUNTER — TELEPHONE (OUTPATIENT)
Dept: INTERNAL MEDICINE CLINIC | Facility: CLINIC | Age: 48
End: 2023-11-28

## 2023-11-28 NOTE — TELEPHONE ENCOUNTER
Keanu Almanza can you call this patient back, she needs help with her infusions, she asked to have a return call today

## 2023-11-29 ENCOUNTER — TELEPHONE (OUTPATIENT)
Dept: FAMILY MEDICINE CLINIC | Facility: CLINIC | Age: 48
End: 2023-11-29

## 2023-11-29 ENCOUNTER — OFFICE VISIT (OUTPATIENT)
Dept: UROLOGY | Facility: CLINIC | Age: 48
End: 2023-11-29
Payer: COMMERCIAL

## 2023-11-29 VITALS
HEIGHT: 62 IN | OXYGEN SATURATION: 98 % | DIASTOLIC BLOOD PRESSURE: 70 MMHG | BODY MASS INDEX: 22.23 KG/M2 | SYSTOLIC BLOOD PRESSURE: 128 MMHG | WEIGHT: 120.8 LBS | RESPIRATION RATE: 16 BRPM | HEART RATE: 96 BPM

## 2023-11-29 DIAGNOSIS — R35.0 URINE FREQUENCY: Primary | ICD-10-CM

## 2023-11-29 LAB
POST-VOID RESIDUAL VOLUME, ML POC: 23 ML
SL AMB  POCT GLUCOSE, UA: NORMAL
SL AMB LEUKOCYTE ESTERASE,UA: NORMAL
SL AMB POCT BILIRUBIN,UA: NORMAL
SL AMB POCT BLOOD,UA: NORMAL
SL AMB POCT CLARITY,UA: CLEAR
SL AMB POCT COLOR,UA: YELLOW
SL AMB POCT KETONES,UA: NORMAL
SL AMB POCT NITRITE,UA: NORMAL
SL AMB POCT PH,UA: 5
SL AMB POCT SPECIFIC GRAVITY,UA: 1005
SL AMB POCT URINE PROTEIN: NORMAL
SL AMB POCT UROBILINOGEN: 0.2

## 2023-11-29 PROCEDURE — 81002 URINALYSIS NONAUTO W/O SCOPE: CPT | Performed by: PHYSICIAN ASSISTANT

## 2023-11-29 PROCEDURE — 99213 OFFICE O/P EST LOW 20 MIN: CPT | Performed by: PHYSICIAN ASSISTANT

## 2023-11-29 PROCEDURE — 51798 US URINE CAPACITY MEASURE: CPT | Performed by: PHYSICIAN ASSISTANT

## 2023-11-29 RX ORDER — OXYBUTYNIN CHLORIDE 10 MG/1
10 TABLET, EXTENDED RELEASE ORAL
Qty: 90 TABLET | Refills: 3 | Status: SHIPPED | OUTPATIENT
Start: 2023-11-29

## 2023-11-29 NOTE — PROGRESS NOTES
11/29/2023      Chief Complaint   Patient presents with    Nephrolithiasis         Assessment and Plan    41 y.o. female     1. Urinary frequency  2. Urinary urgency  3. Urge urinary incontinence   - UA today negative for nitrites, leukocytes, blood  - PVR today   - Call with any questions or concerns in the meantime  - All questions answered; patient understands and agrees with plan       History of Present Illness  Wendy Lechuga is a 41 y.o. female patient with history of nephrolithiasis here for evaluation of urinary symptoms. Patient states she has been having urinary frequency, urinary urgency, and urge urinary incontinence that is worsening over the past 6 months. Patient denies flank pain, dysuria, gross hematuria. Denies prior medication for OAB in the past. Does have some stress urinary incontinence with incontinence. Medical history includes type 1 diabetes, cystic fibrosis status post lung transplant, hypertension, ulcerative pancolitis, history of blood clots     Review of Systems   Constitutional:  Negative for activity change, appetite change, chills and fever. HENT:  Negative for congestion and trouble swallowing. Respiratory:  Negative for cough and shortness of breath. Cardiovascular:  Negative for chest pain, palpitations and leg swelling. Gastrointestinal:  Negative for abdominal pain, constipation, diarrhea, nausea and vomiting. Genitourinary:  Positive for frequency and urgency. Negative for difficulty urinating, dysuria, flank pain and hematuria. Musculoskeletal:  Negative for back pain and gait problem. Skin:  Negative for wound. Neurological:  Negative for dizziness and syncope. Hematological:  Does not bruise/bleed easily. Psychiatric/Behavioral:  Negative for confusion. All other systems reviewed and are negative.         Vitals  Vitals:    11/29/23 1218   BP: 128/70   BP Location: Left arm   Patient Position: Sitting   Cuff Size: Large   Pulse: 96 Resp: 16   SpO2: 98%   Weight: 54.8 kg (120 lb 12.8 oz)   Height: 5' 2" (1.575 m)       Physical Exam  Constitutional:       General: She is not in acute distress. Appearance: Normal appearance. She is not ill-appearing, toxic-appearing or diaphoretic. HENT:      Head: Normocephalic. Nose: No congestion. Eyes:      General: No scleral icterus. Right eye: No discharge. Left eye: No discharge. Conjunctiva/sclera: Conjunctivae normal.      Pupils: Pupils are equal, round, and reactive to light. Pulmonary:      Effort: Pulmonary effort is normal.   Musculoskeletal:      Cervical back: Normal range of motion. Skin:     General: Skin is warm and dry. Coloration: Skin is not jaundiced or pale. Findings: No bruising, erythema, lesion or rash. Neurological:      General: No focal deficit present. Mental Status: She is alert and oriented to person, place, and time. Mental status is at baseline. Gait: Gait normal.   Psychiatric:         Mood and Affect: Mood normal.         Behavior: Behavior normal.         Thought Content: Thought content normal.         Judgment: Judgment normal.           Past History  Past Medical History:   Diagnosis Date    ABPA (allergic bronchopulmonary aspergillosis) ([address]) 07/08/2012    Acute kidney injury ([address]) 09/08/2017    Acute on chronic respiratory failure ([address]) 03/04/2017    Last Assessment & Plan:  Formatting of this note might be different from the original. Recurrent with clinical worsening with enlarging PTX, s/p multiple chest tubes, but new fevers 6/19 and worsening leukocytosis indicating infection as cause of 6/19 decompensation with profound hypoxia in setting of shunting through right lung and continued pneumothorax s/p 2nd chest tube placement.   6/27: Right    Anaphylaxis 06/06/2022    Anemia     Asthma     Chest pain 09/21/2021    Chronic pain     Chronic respiratory failure with hypoxia ([address]) 09/07/2021    COVID-19 2022    Cystic fibrosis ([address])     Deep vein thrombosis (HCC)     Diabetes mellitus (HCC)     GERD (gastroesophageal reflux disease)     HBP (high blood pressure)     HCAP (healthcare-associated pneumonia) 2020    History of transfusion     Influenza B 2020    Kidney stone     Neutropenic fever  2020    Pneumonia due to Pseudomonas species ([address]) 10/28/2010    Last Assessment & Plan:  Formatting of this note might be different from the original. Polymicrobial pna w/ pseudomonas, M. Abscessus, Cadida glabrata/albicans    PONV (postoperative nausea and vomiting)     Pseudomonas aeruginosa infection 2012    Squamous cell skin cancer 2023    SCCIS- Left upper thigh    Transplant recipient     Lung- , 2017    Ulcerative colitis [facility])     Wellness examination 2013    Formatting of this note might be different from the original. Discussed with patient and acknowledgement form signed on 2013 (ALYSHA)     Social History     Socioeconomic History    Marital status: /Civil Union     Spouse name: None    Number of children: None    Years of education: 12    Highest education level: None   Occupational History    Occupation: Disabled    Tobacco Use    Smoking status: Former     Packs/day: 0.50     Years: 12.00     Total pack years: 6.00     Types: Cigarettes     Start date: 1993     Quit date: 2005     Years since quittin.9     Passive exposure: Past    Smokeless tobacco: Never   Vaping Use    Vaping Use: Never used   Substance and Sexual Activity    Alcohol use:  Yes     Alcohol/week: 2.0 standard drinks of alcohol     Types: 2 Glasses of wine per week     Comment: social    Drug use: Yes     Frequency: 2.0 times per week     Types: Marijuana     Comment: medical edible marijuana prescribed    Sexual activity: Yes     Partners: Male     Birth control/protection: None   Other Topics Concern    None   Social History Narrative    Most recent tobacco use screening: 2019    Do you currently or have you served in the [address] [place] [address] Street: No    Were you activated, into active duty, as a member of the [employer] or as a Reservist: No    Occupation: disabled    Education: 12    Marital status:     Exercise level: Occasional    Diet: Specific    low sugar, high fat    General stress level: High    Alcohol intake: Occasional    Caffeine intake: Heavy    Chewing tobacco: none    Illicit drugs: none    Guns present in home: No    Seat belts used routinely: Yes    Smoke alarm in home: Yes    Advance directive: Yes     Social Determinants of Health     Financial Resource Strain: Not on file   Food Insecurity: No Food Insecurity (2023)    Hunger Vital Sign     Worried About Running Out of Food in the Last Year: Never true     Ran Out of Food in the Last Year: Never true   Transportation Needs: No Transportation Needs (2023)    PRAPARE - Transportation     Lack of Transportation (Medical): No     Lack of Transportation (Non-Medical):  No   Physical Activity: Not on file   Stress: Not on file   Social Connections: Not on file   Intimate Partner Violence: Not on file   Housing Stability: Low Risk  (2023)    Housing Stability Vital Sign     Unable to Pay for Housing in the Last Year: No     Number of Places Lived in the Last Year: 1     Unstable Housing in the Last Year: No     Social History     Tobacco Use   Smoking Status Former    Packs/day: 0.50    Years: 12.00    Total pack years: 6.00    Types: Cigarettes    Start date: 1993    Quit date: 2005    Years since quittin.9    Passive exposure: Past   Smokeless Tobacco Never     Family History   Problem Relation Age of Onset    Alcohol abuse Mother     Mental illness Mother     Depression Mother     Alcohol abuse Father     COPD Maternal Grandmother     Lung cancer Maternal Grandmother 72    Cancer Maternal Grandmother     COPD Maternal Grandfather     Cancer Maternal Grandfather     COPD Paternal Grandmother     Lung cancer Paternal Grandmother 72    No Known Problems Sister     No Known Problems Paternal Aunt     Breast cancer Neg Hx     Ovarian cancer Neg Hx     Colon cancer Neg Hx        The following portions of the patient's history were reviewed and updated as appropriate: allergies, current medications, past medical history, past social history, past surgical history and problem list.    Results  Recent Results (from the past 1 hour(s))   POCT urine dip    Collection Time: 11/29/23 12:26 PM   Result Value Ref Range    LEUKOCYTE ESTERASE,UA -     NITRITE,UA -     SL AMB POCT UROBILINOGEN 0.2     POCT URINE PROTEIN -      PH,UA 5.0     BLOOD,UA -     SPECIFIC GRAVITY,UA 1,005     [facility], UA -      COLOR,UA yellow     CLARITY,UA clear    POCT Measure PVR    Collection Time: 11/29/23 12:26 PM   Result Value Ref Range    POST-VOID RESIDUAL VOLUME, ML POC 23 mL   ]  No results found for: "PSA"  Lab Results   Component Value Date    CALCIUM 9.9 08/30/2023    K 4.8 08/30/2023    CO2 26 08/30/2023    CL 99 08/30/2023    BUN 15 08/30/2023    CREATININE 1.15 08/30/2023     Lab Results   Component Value Date    WBC 8.95 08/30/2023    HGB 9.3 (L) 08/30/2023    HCT 32.1 (L) 08/30/2023    MCV 76 (L) 08/30/2023     08/30/2023       Michelle Gayle PA-C

## 2023-12-11 DIAGNOSIS — K21.00 GASTROESOPHAGEAL REFLUX DISEASE WITH ESOPHAGITIS WITHOUT HEMORRHAGE: ICD-10-CM

## 2023-12-11 DIAGNOSIS — E10.22 TYPE 1 DIABETES MELLITUS WITH DIABETIC CHRONIC KIDNEY DISEASE, UNSPECIFIED CKD STAGE (HCC): ICD-10-CM

## 2023-12-11 RX ORDER — DEXLANSOPRAZOLE 60 MG/1
60 CAPSULE, DELAYED RELEASE ORAL DAILY
Qty: 90 CAPSULE | Refills: 1 | Status: SHIPPED | OUTPATIENT
Start: 2023-12-11

## 2023-12-12 ENCOUNTER — TELEPHONE (OUTPATIENT)
Dept: INFUSION CENTER | Facility: CLINIC | Age: 48
End: 2023-12-12

## 2023-12-12 NOTE — TELEPHONE ENCOUNTER
Pt called, LM with a reminder of appt date and time, department policies/ procedures reviewed with pt. Infusion dept call back number left in message.

## 2023-12-13 RX ORDER — INSULIN ASPART 100 [IU]/ML
INJECTION, SOLUTION INTRAVENOUS; SUBCUTANEOUS
Qty: 15 ML | Refills: 0 | Status: SHIPPED | OUTPATIENT
Start: 2023-12-13

## 2023-12-14 ENCOUNTER — HOSPITAL ENCOUNTER (OUTPATIENT)
Dept: INFUSION CENTER | Facility: CLINIC | Age: 48
Discharge: HOME | End: 2023-12-14
Payer: COMMERCIAL

## 2023-12-14 VITALS
RESPIRATION RATE: 18 BRPM | DIASTOLIC BLOOD PRESSURE: 88 MMHG | HEART RATE: 79 BPM | TEMPERATURE: 98.2 F | SYSTOLIC BLOOD PRESSURE: 140 MMHG

## 2023-12-14 DIAGNOSIS — D64.9 ACUTE ON CHRONIC ANEMIA: Primary | ICD-10-CM

## 2023-12-14 PROCEDURE — 96365 THER/PROPH/DIAG IV INF INIT: CPT

## 2023-12-14 RX ORDER — SODIUM CHLORIDE 9 MG/ML
20 INJECTION, SOLUTION INTRAVENOUS ONCE
Status: CANCELLED | OUTPATIENT
Start: 2023-12-14

## 2023-12-14 RX ORDER — SODIUM CHLORIDE 9 MG/ML
20 INJECTION, SOLUTION INTRAVENOUS ONCE
Status: COMPLETED | OUTPATIENT
Start: 2023-12-14 | End: 2023-12-14

## 2023-12-14 RX ADMIN — SODIUM CHLORIDE 20 ML/HR: 0.9 INJECTION, SOLUTION INTRAVENOUS at 12:12

## 2023-12-14 RX ADMIN — IRON SUCROSE 300 MG: 20 INJECTION, SOLUTION INTRAVENOUS at 12:15

## 2023-12-14 NOTE — PROGRESS NOTES
Pt received venofer infusion w/out any adverse effects, offers no complaints. Last tx, no further appts scheduled

## 2024-01-08 ENCOUNTER — HOSPITAL ENCOUNTER (OUTPATIENT)
Age: 49
Discharge: HOME | End: 2024-01-08
Payer: COMMERCIAL

## 2024-01-08 DIAGNOSIS — M81.0 OSTEOPOROSIS, UNSPECIFIED OSTEOPOROSIS TYPE, UNSPECIFIED PATHOLOGICAL FRACTURE PRESENCE: ICD-10-CM

## 2024-01-08 DIAGNOSIS — Z12.31 ENCOUNTER FOR SCREENING MAMMOGRAM FOR MALIGNANT NEOPLASM OF BREAST: ICD-10-CM

## 2024-01-08 PROCEDURE — 77067 SCR MAMMO BI INCL CAD: CPT

## 2024-01-08 PROCEDURE — 77063 BREAST TOMOSYNTHESIS BI: CPT

## 2024-01-08 PROCEDURE — 77080 DXA BONE DENSITY AXIAL: CPT

## 2024-01-09 DIAGNOSIS — R05.1 ACUTE COUGH: Primary | ICD-10-CM

## 2024-01-09 DIAGNOSIS — E84.9 CYSTIC FIBROSIS (HCC): ICD-10-CM

## 2024-01-09 RX ORDER — PROMETHAZINE HYDROCHLORIDE AND CODEINE PHOSPHATE 6.25; 1 MG/5ML; MG/5ML
5 SYRUP ORAL EVERY 6 HOURS PRN
Qty: 180 ML | Refills: 0 | Status: SHIPPED | OUTPATIENT
Start: 2024-01-09 | End: 2024-01-11

## 2024-01-09 RX ORDER — OXYCODONE HYDROCHLORIDE 10 MG/1
10 TABLET ORAL EVERY 8 HOURS PRN
Qty: 90 TABLET | Refills: 0 | Status: SHIPPED | OUTPATIENT
Start: 2024-01-09

## 2024-01-09 RX ORDER — ALPRAZOLAM 1 MG/1
1 TABLET ORAL
Qty: 30 TABLET | Refills: 0 | Status: SHIPPED | OUTPATIENT
Start: 2024-01-09

## 2024-01-11 ENCOUNTER — TELEPHONE (OUTPATIENT)
Dept: INTERNAL MEDICINE CLINIC | Facility: CLINIC | Age: 49
End: 2024-01-11

## 2024-01-11 RX ORDER — DEXTROMETHORPHAN HYDROBROMIDE AND PROMETHAZINE HYDROCHLORIDE 15; 6.25 MG/5ML; MG/5ML
5 SYRUP ORAL 4 TIMES DAILY PRN
Qty: 180 ML | Refills: 0 | Status: SHIPPED | OUTPATIENT
Start: 2024-01-11

## 2024-01-15 ENCOUNTER — OFFICE VISIT (OUTPATIENT)
Dept: INTERNAL MEDICINE CLINIC | Facility: CLINIC | Age: 49
End: 2024-01-15
Payer: COMMERCIAL

## 2024-01-15 VITALS
HEART RATE: 70 BPM | BODY MASS INDEX: 20.98 KG/M2 | OXYGEN SATURATION: 98 % | WEIGHT: 118.4 LBS | SYSTOLIC BLOOD PRESSURE: 120 MMHG | DIASTOLIC BLOOD PRESSURE: 84 MMHG | TEMPERATURE: 94.1 F | HEIGHT: 63 IN

## 2024-01-15 DIAGNOSIS — E10.22 TYPE 1 DIABETES MELLITUS WITH DIABETIC CHRONIC KIDNEY DISEASE, UNSPECIFIED CKD STAGE (HCC): Primary | ICD-10-CM

## 2024-01-15 DIAGNOSIS — E84.9 CYSTIC FIBROSIS (HCC): ICD-10-CM

## 2024-01-15 DIAGNOSIS — Z94.2 HX OF LUNG TRANSPLANT (HCC): ICD-10-CM

## 2024-01-15 DIAGNOSIS — D84.9 IMMUNOSUPPRESSION (HCC): ICD-10-CM

## 2024-01-15 DIAGNOSIS — M85.80 OSTEOPENIA, UNSPECIFIED LOCATION: ICD-10-CM

## 2024-01-15 DIAGNOSIS — F11.20 CONTINUOUS OPIOID DEPENDENCE (HCC): ICD-10-CM

## 2024-01-15 DIAGNOSIS — N18.31 STAGE 3A CHRONIC KIDNEY DISEASE (HCC): ICD-10-CM

## 2024-01-15 PROBLEM — R22.2 ABDOMINAL WALL LUMP: Status: RESOLVED | Noted: 2023-02-27 | Resolved: 2024-01-15

## 2024-01-15 PROBLEM — E87.1 HYPONATREMIA: Status: RESOLVED | Noted: 2023-05-03 | Resolved: 2024-01-15

## 2024-01-15 PROBLEM — N18.2 CKD (CHRONIC KIDNEY DISEASE) STAGE 2, GFR 60-89 ML/MIN: Status: RESOLVED | Noted: 2019-11-12 | Resolved: 2024-01-15

## 2024-01-15 PROBLEM — N20.0 RENAL CALCULUS: Status: RESOLVED | Noted: 2022-08-01 | Resolved: 2024-01-15

## 2024-01-15 PROBLEM — E43 SEVERE PROTEIN-CALORIE MALNUTRITION (HCC): Status: RESOLVED | Noted: 2018-06-05 | Resolved: 2024-01-15

## 2024-01-15 PROBLEM — N93.9 ABNORMAL UTERINE BLEEDING (AUB): Status: RESOLVED | Noted: 2022-07-07 | Resolved: 2024-01-15

## 2024-01-15 PROBLEM — M81.8 OTHER OSTEOPOROSIS WITHOUT CURRENT PATHOLOGICAL FRACTURE: Status: ACTIVE | Noted: 2020-11-05

## 2024-01-15 PROBLEM — D61.818 OTHER PANCYTOPENIA (HCC): Status: ACTIVE | Noted: 2022-11-08

## 2024-01-15 PROBLEM — M81.0 OSTEOPOROSIS: Status: RESOLVED | Noted: 2020-11-05 | Resolved: 2024-01-15

## 2024-01-15 LAB — SL AMB POCT HEMOGLOBIN AIC: 8 (ref ?–6.5)

## 2024-01-15 PROCEDURE — 99214 OFFICE O/P EST MOD 30 MIN: CPT | Performed by: INTERNAL MEDICINE

## 2024-01-15 PROCEDURE — 83036 HEMOGLOBIN GLYCOSYLATED A1C: CPT | Performed by: INTERNAL MEDICINE

## 2024-01-15 RX ORDER — DIPHENOXYLATE HYDROCHLORIDE AND ATROPINE SULFATE 2.5; .025 MG/1; MG/1
1 TABLET ORAL DAILY
COMMUNITY

## 2024-01-15 RX ORDER — RIBOFLAVIN (VITAMIN B2) 100 MG
100 TABLET ORAL DAILY
COMMUNITY

## 2024-01-15 RX ORDER — VIT C/B6/B5/MAGNESIUM/HERB 173 50-5-6-5MG
CAPSULE ORAL
COMMUNITY

## 2024-01-15 RX ORDER — ALBUTEROL SULFATE 90 UG/1
AEROSOL, METERED RESPIRATORY (INHALATION)
COMMUNITY
Start: 2023-11-29

## 2024-01-15 NOTE — ASSESSMENT & PLAN NOTE
Lab Results   Component Value Date    EGFR 57 08/30/2023    EGFR 67 05/07/2023    EGFR 70 05/07/2023    CREATININE 1.15 08/30/2023    CREATININE 1.01 05/07/2023    CREATININE 0.97 05/07/2023   Stable.  Continue to monitor.  We discussed reducing dose of PPI following TIF procedure in the setting of osteopenia, chronic steroid use and CKD.  Patient does not want to dose-reduce PPI at this time as she feels that it is helping with some residual reflux sx following TIF procedure in 2/23.  We discussed risks associated with on-gong/long-term PPI use.

## 2024-01-15 NOTE — PROGRESS NOTES
Name: Maximilian Mann      : 1975      MRN: [id number]  Encounter Provider: Solis Avila MD  Encounter Date: 1/15/2024   Encounter department: [facility] INTERNAL MEDICINE [place]    Assessment & Plan     1. Type 1 diabetes mellitus with diabetic chronic kidney disease, unspecified CKD stage (HCC)  Assessment & Plan:    Lab Results   Component Value Date    HGBA1C 8.5 2023     A1c POC in office today 8  Following with endocrinology  Continue Novolog 3u TID with meals and ISS  Continue Semglee daily with breakfast and hs  Diabetic foot exam performed in the office today (see below)  Worsening vision, especially at night, issues with light blurring and scattering.  Refer to Ophthalmology    Orders:  -     Lipid Panel with Direct LDL reflex; Future  -     POCT hemoglobin A1c  -     Ambulatory Referral to Ophthalmology; Future    2. Hx of lung transplant (HCC)    3. Continuous opioid dependence (HCC)    4. Immunosuppression ([facility])  Assessment & Plan:  Maintained on tacrolimus.  Levels recently acceptable.  Renal fxn at baseline.  \  She follows closely with her transplant team at [place]  Recently had residual BCC lesion on chest excised by dermatology in November, continue with dermatology surveillance q6 months      5. Cystic fibrosis (HCC)  Assessment & Plan:  Follows closely with [facility] transplant team      6. Stage 3a chronic kidney disease (HCC)  Assessment & Plan:  Lab Results   Component Value Date    EGFR 57 2023    EGFR 67 2023    EGFR 70 2023    CREATININE 1.15 2023    CREATININE 1.01 2023    CREATININE 0.97 2023   Stable.  Continue to monitor.  We discussed reducing dose of PPI following TIF procedure in the setting of osteopenia, chronic steroid use and CKD.  Patient does not want to dose-reduce PPI at this time as she feels that it is helping with some residual reflux sx following TIF procedure in .  We discussed risks associated with  on-gong/long-term PPI use.        7. Osteopenia, unspecified location  Assessment & Plan:  Reviewed recent DEXA 1/08/24.  LUMBAR SPINE Level: L1-L4: BMD: 0.963 gm/cm2 Z-score: -0.3 LEFT TOTAL HIP:BMD: 0.650 gm/cm2 Z-score: -2.1.  LEFT FEMORAL NECK: BMD: 0.647 gm/cm2 Z-score: -1.3.  Unable to calculate FRAX score with Z-score values given age <65.    Check 25 hydroxy vit D levels.  Optimize vit D and calcium supplementation.  We discussed alternative non-pharmacologic management with isometric exercises/using bands for tension etc.        Orders:  -     Vitamin D 25 hydroxy; Future    Patient's shoes and socks removed.    Right Foot/Ankle   Right Foot Inspection  Skin Exam: skin normal and skin intact. No dry skin, no warmth, no callus, no erythema, no maceration, no abnormal color, no pre-ulcer, no ulcer and no callus.     Toe Exam: No swelling, no tenderness, erythema and  no right toe deformity    Sensory   Vibration: absent  Proprioception: intact  Monofilament testing: intact    Vascular  Capillary refills: < 3 seconds  The right DP pulse is 2+. The right PT pulse is 2+.     Left Foot/Ankle  Left Foot Inspection  Skin Exam: skin normal and skin intact. No dry skin, no warmth, no erythema, no maceration, normal color, no pre-ulcer, no ulcer and no callus.     Toe Exam: No swelling, no tenderness, no erythema and no left toe deformity.     Sensory   Vibration: intact  Proprioception: intact  Monofilament testing: intact    Vascular  Capillary refills: < 3 seconds  The left DP pulse is 2+. The left PT pulse is 2+.     Assign Risk Category  No deformity present  No loss of protective sensation  No weak pulses  Risk: 0          Subjective      I had the pleasure of seeing Maximilian today for follow-up.  Maximilian is doing well.  She had some recent URI infections though seems to be getting better.  She notes some mild interval improvement in her acute on chronic cough.  She had TIF procedure in February, notes some  improvement in her reflux symptoms.  Still feels that she needs to take famotidine and Dexilant daily to control her symptoms as she does have some residual reflux.  She does have fatigue.  Notes no pain anymore in RLE though does have neuropathy with some numbness and tingling.  Over the last year, she has noted worsening vision, especially at night time.  She notes scattering and blurring of lights.  Told by an eye doctor in the past that she was developing cataracts.  She does have DM1, A1c control stable.        Review of Systems   Constitutional:  Positive for activity change and fatigue. Negative for fever.   Eyes:  Positive for visual disturbance.   Respiratory:  Positive for cough and shortness of breath. Negative for wheezing.    Cardiovascular:  Negative for chest pain, palpitations and leg swelling.   Gastrointestinal:  Negative for abdominal pain, anal bleeding, blood in stool, constipation and diarrhea.   Allergic/Immunologic: Positive for immunocompromised state.       Current Outpatient Medications on File Prior to Visit   Medication Sig   • acetaminophen (TYLENOL) 325 mg tablet Take 3 tablets (975 mg total) by mouth every 8 (eight) hours   • albuterol (2.5 mg/3 mL) 0.083 % nebulizer solution Take 6 mL (5 mg total) by nebulization 2 (two) times a day   • albuterol (PROVENTIL HFA,VENTOLIN HFA) 90 mcg/act inhaler INHALE 2 PUFFS EVERY 8 HOURS AS NEEDED FOR SHORTNESS OF BREATH OR WHEEZING.   • ALPRAZolam (XANAX) 1 mg tablet Take 1 tablet (1 mg total) by mouth daily at bedtime as needed for anxiety or sleep   • apixaban (ELIQUIS) 5 mg Take 1 tablet (5 mg total) by mouth 2 (two) times a day   • Ascorbic Acid (vitamin C) 100 MG tablet Take 100 mg by mouth daily   • atovaquone (MEPRON) 750 mg/5 mL suspension Take 1,500 mg by mouth daily   • azithromycin (ZITHROMAX) 250 mg tablet Take 250 mg by mouth 3 (three) times a week   • BD Pen Needle Abi U/F 32G X 4 MM MISC 4 times daily   • bisacodyl (DULCOLAX) 5 mg  EC tablet Take 1 tablet (5 mg total) by mouth daily at bedtime   • Cholecalciferol (Vitamin D3) 125 MCG (5000 UT) TABS Take 5,000 Units by mouth daily   • Continuous Blood Gluc  (Dexcom G7 ) SUKHI Use 1 each continuous   • Continuous Blood Gluc Sensor (Dexcom G7 Sensor) Use 1 Device every 10 days   • dexlansoprazole (DEXILANT) 60 MG capsule Take 1 capsule (60 mg total) by mouth daily TAKE 1 CAPSULE BY MOUTH EVERY DAY IN THE MORNING   • dronabinol (MARINOL) 5 MG capsule Take 1 capsule (5 mg total) by mouth 3 (three) times a day   • famotidine (PEPCID) 40 MG tablet Take 1 tablet (40 mg total) by mouth daily at bedtime   • insulin aspart (NovoLOG FlexPen) 100 UNIT/ML injection pen INJECT 3 UNITS UNDER THE SKIN 3 TIMES A DAY WITH MEALS   • Insulin Pen Needle (Comfort EZ Pen Needles) 33G X 4 MM MISC Use to inject insulin 4 times a day   • Iron Sucrose (VENOFER IV) Infuse 300 mg once daily for two doses   • multivitamin (THERAGRAN) TABS Take 1 tablet by mouth daily   • naloxone (NARCAN) 4 mg/0.1 mL nasal spray 4 mg into each nostril   • oxybutynin (DITROPAN-XL) 10 MG 24 hr tablet Take 1 tablet (10 mg total) by mouth daily at bedtime   • oxyCODONE (ROXICODONE) 10 MG TABS Take 1 tablet (10 mg total) by mouth every 8 (eight) hours as needed for severe pain Max Daily Amount: 30 mg   • pancrelipase, Lip-Prot-Amyl, (CREON) 24,000 units Take 48,000 Units by mouth 3 (three) times a day with meals   • polyethylene glycol (MIRALAX) 17 g packet Take 17 g by mouth 2 (two) times a day   • predniSONE 10 mg tablet Take 10 mg by mouth daily   • pregabalin (LYRICA) 150 mg capsule Take 1 capsule (150 mg total) by mouth 2 (two) times a day   • promethazine-dextromethorphan (PHENERGAN-DM) 6.25-15 mg/5 mL oral syrup Take 5 mL by mouth 4 (four) times a day as needed for cough   • Respiratory Therapy Supplies (Nebulizer) SUKHI Please dispense nebulizer machine   • Semglee, yfgn, 100 UNIT/ML SOPN Inject 0.06 mL (6 Units total) as  "directed daily with breakfast 12 units hs   • tacrolimus (PROGRAF) 1 mg capsule Take 1 mg by mouth 2 (two) times a day Take 2 tablets and a 0.5 table to total 2.5 mg twice a day 5mg total daily   • Turmeric 500 MG CAPS Take by mouth   • valACYclovir (VALTREX) 500 mg tablet Take 500 mg by mouth daily   • vitamin B-12 (VITAMIN B-12) 1,000 mcg tablet Take by mouth daily   • linaCLOtide (Linzess) 145 MCG CAPS Take 1 capsule (145 mcg total) by mouth daily (Patient not taking: Reported on 1/15/2024)   • [DISCONTINUED] cyclobenzaprine (FLEXERIL) 5 mg tablet Take 1-2 tabs every 8 hours as needed for muscle pain/spasm (Patient not taking: Reported on 10/13/2022)   • [DISCONTINUED] traZODone (DESYREL) 50 mg tablet Take 0.5 tablets (25 mg total) by mouth daily at bedtime       Objective     /84 (BP Location: Right arm, Patient Position: Sitting, Cuff Size: Adult)   Pulse 70   Temp (!) 94.1 °F (34.5 °C) (Tympanic)   Ht 5' 2.5\" (1.588 m)   Wt 53.7 kg (118 lb 6.4 oz)   LMP 01/08/2024 (Exact Date)   SpO2 98%   BMI 21.31 kg/m²     Physical Exam  Constitutional:       General: She is not in acute distress.     Appearance: She is not ill-appearing or toxic-appearing.   Cardiovascular:      Rate and Rhythm: Normal rate and regular rhythm.      Pulses: no weak pulses          Dorsalis pedis pulses are 2+ on the right side and 2+ on the left side.        Posterior tibial pulses are 2+ on the right side and 2+ on the left side.   Pulmonary:      Effort: No respiratory distress.      Breath sounds: No wheezing, rhonchi or rales.      Comments: Decreased breath sounds bilat.   Abdominal:      General: There is no distension.      Palpations: Abdomen is soft. There is no mass.      Tenderness: There is no abdominal tenderness. There is no guarding.   Musculoskeletal:         General: No swelling.   Feet:      Right foot:      Skin integrity: No ulcer, skin breakdown, erythema, warmth, callus or dry skin.      Left foot:      " Skin integrity: No ulcer, skin breakdown, erythema, warmth, callus or dry skin.   Skin:     General: Skin is warm and dry.   Neurological:      Mental Status: Mental status is at baseline.       Solis Avila MD

## 2024-01-15 NOTE — ASSESSMENT & PLAN NOTE
Reviewed recent DEXA 1/08/24.  LUMBAR SPINE Level: L1-L4: BMD: 0.963 gm/cm2 Z-score: -0.3 LEFT TOTAL HIP:BMD: 0.650 gm/cm2 Z-score: -2.1.  LEFT FEMORAL NECK: BMD: 0.647 gm/cm2 Z-score: -1.3.  Unable to calculate FRAX score with Z-score values given age <65.    Check 25 hydroxy vit D levels.  Optimize vit D and calcium supplementation.  We discussed alternative non-pharmacologic management with isometric exercises/using bands for tension etc.

## 2024-01-15 NOTE — ASSESSMENT & PLAN NOTE
Maintained on tacrolimus.  Levels recently acceptable.  Renal fxn at baseline.  \  She follows closely with her transplant team at [place]  Recently had residual BCC lesion on chest excised by dermatology in November, continue with dermatology surveillance q6 months

## 2024-01-15 NOTE — ASSESSMENT & PLAN NOTE
Lab Results   Component Value Date    HGBA1C 8.5 02/17/2023     A1c POC in office today 8  Following with endocrinology  Continue Novolog 3u TID with meals and ISS  Continue Semglee daily with breakfast and hs  Diabetic foot exam performed in the office today (see below)  Worsening vision, especially at night, issues with light blurring and scattering.  Refer to Ophthalmology

## 2024-01-24 ENCOUNTER — TELEPHONE (OUTPATIENT)
Dept: GASTROENTEROLOGY | Facility: CLINIC | Age: 49
End: 2024-01-24

## 2024-01-25 ENCOUNTER — OFFICE VISIT (OUTPATIENT)
Dept: DERMATOLOGY | Facility: CLINIC | Age: 49
End: 2024-01-25

## 2024-01-25 ENCOUNTER — TELEPHONE (OUTPATIENT)
Dept: DERMATOLOGY | Facility: CLINIC | Age: 49
End: 2024-01-25

## 2024-01-25 VITALS — HEIGHT: 62 IN | TEMPERATURE: 95.9 F | WEIGHT: 116 LBS | BODY MASS INDEX: 21.35 KG/M2

## 2024-01-25 DIAGNOSIS — L82.1 SEBORRHEIC KERATOSIS: ICD-10-CM

## 2024-01-25 DIAGNOSIS — D22.9 MULTIPLE MELANOCYTIC NEVI: ICD-10-CM

## 2024-01-25 DIAGNOSIS — Z85.89 HISTORY OF SQUAMOUS CELL CARCINOMA: ICD-10-CM

## 2024-01-25 DIAGNOSIS — D48.5 NEOPLASM OF UNCERTAIN BEHAVIOR OF SKIN: Primary | ICD-10-CM

## 2024-01-25 DIAGNOSIS — Z85.828 HISTORY OF BASAL CELL CARCINOMA: ICD-10-CM

## 2024-01-25 DIAGNOSIS — L81.4 LENTIGINES: ICD-10-CM

## 2024-01-25 DIAGNOSIS — D18.01 CHERRY ANGIOMA: ICD-10-CM

## 2024-01-25 PROCEDURE — 88305 TISSUE EXAM BY PATHOLOGIST: CPT | Performed by: DERMATOLOGY

## 2024-01-25 NOTE — PROGRESS NOTES
"[facility] Note     Patient Name: Maximilian Mann  Encounter Date: 1/25/24     Have you been cared for by a [facility] Dermatologist in the last 3 years and, if so, which description applies to you?    Yes.  I have been here within the last 3 years, and my medical history has NOT changed since that time.  I am FEMALE/of child-bearing potential.    REVIEW OF SYSTEMS:  Have you recently had or currently have any of the following? No changes in my recent health.   PAST MEDICAL HISTORY:  Have you personally ever had or currently have any of the following?  If \"YES,\" then please provide more detail. No changes in my medical history.   HISTORY OF IMMUNOSUPPRESSION: Do you have a history of any of the following:  Systemic Immunosuppression such as Diabetes, Biologic or Immunotherapy, Chemotherapy, Organ Transplantation, Bone Marrow Transplantation?  YES, Diabetes, organ transplant.     Answering \"YES\" requires the addition of the dotphrase \"IMMUNOSUPPRESSED\" as the first diagnosis of the patient's visit.   FAMILY HISTORY:  Any \"first degree relatives\" (parent, brother, sister, or child) with the following?    No changes in my family's known health.   PATIENT EXPERIENCE:    Do you want the Dermatologist to perform a COMPLETE skin exam today including a clinical examination under the \"bra and underwear\" areas?  Yes  If necessary, do we have your permission to call and leave a detailed message on your Preferred Phone number that includes your specific medical information?  Yes      Allergies   Allergen Reactions    Bebtelovimab Anaphylaxis    Vancomycin Angioedema and Hives    Gabapentin Hallucinations    Ceftazidime Headache     Severe headaches after desensitization    Nsaids Other (See Comments)     Lung transplant increases risk of renal toxicity, call lung txp provider to discuss if needed      Current Outpatient Medications:     acetaminophen (TYLENOL) 325 mg tablet, Take 3 tablets (975 mg " total) by mouth every 8 (eight) hours, Disp: , Rfl: 0    albuterol (2.5 mg/3 mL) 0.083 % nebulizer solution, Take 6 mL (5 mg total) by nebulization 2 (two) times a day, Disp: , Rfl: 0    albuterol (PROVENTIL HFA,VENTOLIN HFA) 90 mcg/act inhaler, INHALE 2 PUFFS EVERY 8 HOURS AS NEEDED FOR SHORTNESS OF BREATH OR WHEEZING., Disp: , Rfl:     ALPRAZolam (XANAX) 1 mg tablet, Take 1 tablet (1 mg total) by mouth daily at bedtime as needed for anxiety or sleep, Disp: 30 tablet, Rfl: 0    apixaban (ELIQUIS) 5 mg, Take 1 tablet (5 mg total) by mouth 2 (two) times a day, Disp: 30 tablet, Rfl: 0    Ascorbic Acid (vitamin C) 100 MG tablet, Take 100 mg by mouth daily, Disp: , Rfl:     atovaquone (MEPRON) 750 mg/5 mL suspension, Take 1,500 mg by mouth daily, Disp: , Rfl:     azithromycin (ZITHROMAX) 250 mg tablet, Take 250 mg by mouth 3 (three) times a week, Disp: , Rfl:     BD Pen Needle Abi U/F 32G X 4 MM MISC, 4 times daily, Disp: 400 each, Rfl: 1    bisacodyl (DULCOLAX) 5 mg EC tablet, Take 1 tablet (5 mg total) by mouth daily at bedtime, Disp: 30 tablet, Rfl: 0    Cholecalciferol (Vitamin D3) 125 MCG (5000 UT) TABS, Take 5,000 Units by mouth daily, Disp: , Rfl:     Continuous Blood Gluc  (Dexcom G7 ) SUKHI, Use 1 each continuous, Disp: 1 each, Rfl: 0    Continuous Blood Gluc Sensor (Dexcom G7 Sensor), Use 1 Device every 10 days, Disp: 3 each, Rfl: 5    dexlansoprazole (DEXILANT) 60 MG capsule, Take 1 capsule (60 mg total) by mouth daily TAKE 1 CAPSULE BY MOUTH EVERY DAY IN THE MORNING, Disp: 90 capsule, Rfl: 1    dronabinol (MARINOL) 5 MG capsule, Take 1 capsule (5 mg total) by mouth 3 (three) times a day, Disp: 90 capsule, Rfl: 0    famotidine (PEPCID) 40 MG tablet, Take 1 tablet (40 mg total) by mouth daily at bedtime, Disp: 90 tablet, Rfl: 3    insulin aspart (NovoLOG FlexPen) 100 UNIT/ML injection pen, INJECT 3 UNITS UNDER THE SKIN 3 TIMES A DAY WITH MEALS, Disp: 15 mL, Rfl: 0    Insulin Pen Needle (Comfort  EZ Pen Needles) 33G X 4 MM MISC, Use to inject insulin 4 times a day, Disp: 100 each, Rfl: 3    Iron Sucrose (VENOFER IV), Infuse 300 mg once daily for two doses, Disp: , Rfl:     multivitamin (THERAGRAN) TABS, Take 1 tablet by mouth daily, Disp: , Rfl:     naloxone (NARCAN) 4 mg/0.1 mL nasal spray, 4 mg into each nostril, Disp: , Rfl:     oxybutynin (DITROPAN-XL) 10 MG 24 hr tablet, Take 1 tablet (10 mg total) by mouth daily at bedtime, Disp: 90 tablet, Rfl: 3    oxyCODONE (ROXICODONE) 10 MG TABS, Take 1 tablet (10 mg total) by mouth every 8 (eight) hours as needed for severe pain Max Daily Amount: 30 mg, Disp: 90 tablet, Rfl: 0    pancrelipase, Lip-Prot-Amyl, (CREON) 24,000 units, Take 48,000 Units by mouth 3 (three) times a day with meals, Disp: , Rfl:     polyethylene glycol (MIRALAX) 17 g packet, Take 17 g by mouth 2 (two) times a day, Disp: , Rfl: 0    predniSONE 10 mg tablet, Take 10 mg by mouth daily, Disp: , Rfl:     pregabalin (LYRICA) 150 mg capsule, Take 1 capsule (150 mg total) by mouth 2 (two) times a day, Disp: 180 capsule, Rfl: 1    promethazine-dextromethorphan (PHENERGAN-DM) 6.25-15 mg/5 mL oral syrup, Take 5 mL by mouth 4 (four) times a day as needed for cough, Disp: 180 mL, Rfl: 0    Respiratory Therapy Supplies (Nebulizer) SUKHI, Please dispense nebulizer machine, Disp: , Rfl:     Semglee, yfgn, 100 UNIT/ML SOPN, Inject 0.06 mL (6 Units total) as directed daily with breakfast 12 units hs, Disp: 3 mL, Rfl: 1    tacrolimus (PROGRAF) 1 mg capsule, Take 1 mg by mouth 2 (two) times a day Take 2 tablets and a 0.5 table to total 2.5 mg twice a day 5mg total daily, Disp: , Rfl:     Turmeric 500 MG CAPS, Take by mouth, Disp: , Rfl:     valACYclovir (VALTREX) 500 mg tablet, Take 500 mg by mouth daily, Disp: , Rfl:     vitamin B-12 (VITAMIN B-12) 1,000 mcg tablet, Take by mouth daily, Disp: , Rfl:     linaCLOtide (Linzess) 145 MCG CAPS, Take 1 capsule (145 mcg total) by mouth daily (Patient not taking:  "Reported on 1/15/2024), Disp: 30 capsule, Rfl: 0        Patient present for skin check, dry spot on left calf, itchy.    Whom besides the patient is providing clinical information about today's encounter?   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       HISTORY OF SQUAMOUS CELL CARCINOMA      Physical Exam:  Anatomic Location Affected:  Left upper thigh  Morphological Description of Scar:  Well healed scar  Suspected Recurrence: no       Additional History of Present Condition:  Lesion excised 03/16/2023.     Assessment and Plan:  Based on a thorough discussion of this condition and the management approach to it (including a comprehensive discussion of the known risks, side effects and potential benefits of treatment), the patient (family) agrees to implement the following specific plan:  Continue with 6 month skin exams  Continue to monitor skin for any changes  Use a moisturizer + sunscreen \"combo\" product such as Neutrogena Daily Defense SPF 50+ or CeraVe AM at least three times a day.     How can SCC be prevented?  The most important way to prevent SCC is to avoid sunburn. This is especially important in childhood and early life. Fair skinned individuals and those with a personal or family history of BCC should protect their skin from sun exposure daily, year-round and lifelong.  Stay indoors or under the shade in the middle of the day   Wear covering clothing   Apply high protection factor SPF50+ broad-spectrum sunscreens generously to exposed skin if outdoors   Avoid indoor tanning (sun beds, solaria)        What is the outlook for SCC?  Most SCC are cured by treatment. Cure is most likely if treatment is undertaken when the lesion is small. A small percent of SCC's can spread to lymph  nodes and long term monitoring is indicated.   They are also at increased risk of other " skin cancers, especially melanoma. Regular self-skin examinations and long-term annual skin checks by an experienced health professional are recommended.      HISTORY OF BASAL CELL CARCINOMA    Physical Exam:  Anatomic Location Affected:  Central Chest  Morphological Description of scar:  Well healed scar  Suspected Recurrence: No  Pertinent Positives:  Pertinent Negatives:      Additional History of Present Condition:  History of basal cell carcinoma with no sign of recurrence; 11/16/23 excised.     Assessment and Plan:  Based on a thorough discussion of this condition and the management approach to it (including a comprehensive discussion of the known risks, side effects and potential benefits of treatment), the patient (family) agrees to implement the following specific plan:  6 month skin checks      How can basal cell carcinoma be prevented?  The most important way to prevent BCC is to avoid sunburn. This is especially important in childhood and early life. Fair skinned individuals and those with a personal or family history of BCC should protect their skin from sun exposure daily, year-round and lifelong.  Stay indoors or under the shade in the middle of the day   Wear covering clothing   Apply high protection factor SPF50+ broad-spectrum sunscreens generously to exposed skin if outdoors   Avoid indoor tanning (sun beds, solaria)  Oral nicotinamide (vitamin B3) in a dose of 500 mg twice daily may reduce the number and severity of BCCs.    What is the outlook for basal cell carcinoma?  Most BCCs are cured by treatment. Cure is most likely if treatment is undertaken when the lesion is small.  About 50% of people with BCC develop a second one within 3 years of the first. They are also at increased risk of other skin cancers, especially melanoma. Regular self-skin examinations and long-term annual skin checks by an experienced health professional are recommended.     SEBORRHEIC KERATOSES  - Relevant exam:  Scattered over the trunk/extremities are waxy brown to black plaques and papules with stuck on appearance and consistent dermoscopy  - Exam and clinical history consistent with seborrheic keratoses  - Counseled that these are benign growths that do not require treatment  - Counseled that removal of lesions is considered cosmetic and so would incur a fee should patient elect to move forward.       MELANOCYTIC NEVI  -Relevant exam: Scattered over the trunk/extremities are homogenously pigmented brown macules and papules. ELM performed and without concerning findings. No outliers unless otherwise noted in today's note  - Exam and clinical history consistent with melanocytic nevi  - Educated on the ABCDE's of melanoma; handout provided  - Counseled to return to clinic prior to scheduled appointment should any of these lesions change or should any new lesions of concern arise  - Counseled on use of sun protection daily. Reviewed latest FDA sunscreen guidelines, including use of broad spectrum (UVA and UVB blocking) sunscreen or sun protective clothing with SPF 30-50 every 2-3 hours and reapplied after exposure to water; use of photoprotective clothing, including a broad brim hat and UPF rated clothing if outdoors for several hours; avoid use of tanning beds as these pose significant risk for melanoma and skin cancer.    LENTIGINES  OTHER SKIN CHANGES DUE TO CHRONIC EXPOSURE TO NONIONIZING RADIATION  - Relevant exam: Over sun exposed areas are brown macules. ELM performed and without concerning findings.  - Exam and clinical history consistent with lentigines.  - Educated that these are indicative of prior sun exposure.   - Counseled to return to clinic prior to scheduled appointment should any of these lesions change or should any new lesions of concern arise.  - Recommended use of sunscreen as above and below.  - Counseled on use of sun protection daily. Reviewed latest FDA sunscreen guidelines, including use of broad  spectrum (UVA and UVB blocking) sunscreen or sun protective clothing with SPF 30-50 every 2-3 hours and reapplied after exposure to water; use of photoprotective clothing, including a broad brim hat and UPF rated clothing if outdoors for several hours; avoid use of tanning beds as these pose significant risk for melanoma and skin cancer.    CHERRY ANGIOMAS  - Relevant exam: Scattered over the trunk/extremities are red papules  - Exam and clinical history consistent with cherry angiomas  - Educated that these are benign  - Educated that removal is considered aesthetic and would incur a fee.          NEOPLASM OF UNCERTAIN BEHAVIOR    Physical Exam:  (Anatomic Location); (Size and Morphological Description); (Differential Diagnosis):  Specimen A: 48 year old female with history of BCC/SCC; skin;shave biopsy; left posterior calf, 4 mm pink scaly papule. Differential diagnosis: Basal cell carcinoma vs squamous cell carcinoma vs actinic keratosis vs keratosis/porokeratosis    Pertinent Positives:  Pertinent Negatives:    Additional History of Present Condition:  Dry spot present for a bout a month, patient states it is itchy.    Plan:  Shave biopsy       PROCEDURES PERFORMED TODAY ASSOCIATED WITH THIS CONDITION:          TANGENTIAL BIOPSY  PROCEDURE TANGENTIAL (SHAVE) BIOPSY NOTE:    Performing Physician:   Anatomic Location; Clinical Description with size (cm); Pre-Op Diagnosis:   Specimen A: 48 year old female with history of BCC/SCC; skin;shave biopsy; left posterior calf, 4 mm pink scaly papule. Differential diagnosis: Basal cell carcinoma vs squamous cell carcinoma vs actinic keratosis vs keratosis/porokeratosis  Post-op diagnosis: Same     Local anesthesia: 1% xylocaine with epi      Topical anesthesia: None    Hemostasis: Aluminum chloride       After obtaining informed consent  at which time there was a discussion about the purpose of biopsy  and low risks of infection and bleeding.  The area was prepped  "and draped in the usual fashion. Anesthesia was obtained with 1% lidocaine with epinephrine. A shave biopsy to an appropriate sampling depth was obtained by Shave (Dermablade or 15 blade) The resulting wound was covered with surgical ointment and bandaged appropriately.     The patient tolerated the procedure well without complications and was without signs of functional compromise.      Specimen has been sent for review by Dermatopathology.    Standard post-procedure care has been explained and has been included in written form within the patient's copy of Informed Consent.    INFORMED CONSENT DISCUSSION AND POST-OPERATIVE INSTRUCTIONS FOR PATIENT    I.  RATIONALE FOR PROCEDURE  I understand that a skin biopsy allows the Dermatologist to test a lesion or rash under the microscope to obtain a diagnosis.  It usually involves numbing the area with numbing medication and removing a small piece of skin; sometimes the area will be closed with sutures. In this specific procedure, sutures are not usually needed.  If any sutures are placed, then they are usually need to be removed in 2 weeks or less.    I understand that my Dermatologist recommends that a skin \"shave\" biopsy be performed today.  A local anesthetic, similar to the kind that a dentist uses when filling a cavity, will be injected with a very small needle into the skin area to be sampled.  The injected skin and tissue underneath \"will go to sleep” and become numb so no pain should be felt afterwards.  An instrument shaped like a tiny \"razor blade\" (shave biopsy instrument) will be used to cut a small piece of tissue and skin from the area so that a sample of tissue can be taken and examined more closely under the microscope.  A slight amount of bleeding will occur, but it will be stopped with direct pressure and a pressure bandage and any other appropriate methods.  I understands that a scar will form where the wound was created.  Surgical ointment will be " "applied to help protect the wound.  Sutures are not usually needed.    II.  RISKS AND POTENTIAL COMPLICATIONS   I understand the risks and potential complications of a skin biopsy include but are not limited to the following:  Bleeding  Infection  Pain  Scar/keloid  Skin discoloration  Incomplete Removal  Recurrence  Nerve Damage/Numbness/Loss of Function  Allergic Reaction to Anesthesia  Biopsies are diagnostic procedures and based on findings additional treatment or evaluation may be required  Loss or destruction of specimen resulting in no additional findings    My Dermatologist has explained to me the nature of the condition, the nature of the procedure, and the benefits to be reasonably expected compared with alternative approaches.  My Dermatologist has discussed the likelihood of major risks or complications of this procedure including the specific risks listed above, such as bleeding, infection, and scarring/keloid.  I understand that a scar is expected after this procedure.  I understand that my physician cannot predict if the scar will form a \"keloid,\" which extends beyond the borders of the wound that is created.  A keloid is a thick, painful, and bumpy scar.  A keloid can be difficult to treat, as it does not always respond well to therapy, which includes injecting cortisone directly into the keloid every few weeks.  While this usually reduces the pain and size of the scar, it does not eliminate it.      I understand that photographs may be taken before and after the procedure.  These will be maintained as part of the medical providers confidential records and may not be made available to me.  I further authorize the medical provider to use the photographs for teaching purposes or to illustrate scientific papers, books, or lectures if in his/her judgment, medical research, education, or science may benefit from its use.    I have had an opportunity to fully inquire about the risks and benefits of this " "procedure and its alternatives.   I have been given ample time and opportunity to ask questions and to seek a second opinion if I wished to do so.  I acknowledge that there have specifically been no guarantees as to the cosmetic results from the procedure.  I am aware that with any procedure there is always the possibility of an unexpected complication.    III. POST-PROCEDURAL CARE (WHAT YOU WILL NEED TO DO \"AFTER THE BIOPSY\" TO OPTIMIZE HEALING)    Keep the area clean and dry.  Try NOT to remove the bandage or get it wet for the first 24 hours.    Gently clean the area and apply surgical ointment (such as Vaseline petrolatum ointment, which is available \"over the counter\" and not a prescription) to the biopsy site for up to 2 weeks straight.  This acts to protect the wound from the outside world.      Sutures are not usually placed in this procedure.  If any sutures were placed, return for suture removal as instructed (generally 1 week for the face, 2 weeks for the body).      Take Acetaminophen (Tylenol) for discomfort, if no contraindications.  Ibuprofen or aspirin could make bleeding worse.    Call our office immediately for signs of infection: fever, chills, increased redness, warmth, tenderness, discomfort/pain, or pus or foul smell coming from the wound.    WHAT TO DO IF THERE IS ANY BLEEDING?  If a small amount of bleeding is noticed, place a clean cloth over the area and apply firm pressure for ten minutes.  Check the wound after 10 minutes of direct pressure.  If bleeding persists, try one more time for an additional 10 minutes of direct pressure on the area.  If the bleeding becomes heavier or does not stop after the second attempt, or if you have any other questions about this procedure, then please call your [facility]'s Dermatologist by calling [phone] (SKIN).     I hereby acknowledge that I have reviewed and verified the site with my Dermatologist and have requested and authorized my Dermatologist " to proceed with the procedure.         Medical Complexity:    UNDIAGNOSED NEW PROBLEM WITH UNCERTAIN DIAGNOSIS.  A condition included in the differential diagnosis represents a high risk of morbidity without treatment.           Scribe Attestation      I,:  Mary Negron am acting as a scribe while in the presence of the attending physician.:       I,:  Bridgette Carrera MD personally performed the services described in this documentation    as scribed in my presence.:

## 2024-01-25 NOTE — TELEPHONE ENCOUNTER
Called patient to see if she would be able to come in any earlier for her appt with Dr. Carrera this afternoon. She is scheduled at 4:30, but advised that if she can come in any earlier it would be appreciated.

## 2024-01-29 PROCEDURE — 88305 TISSUE EXAM BY PATHOLOGIST: CPT | Performed by: DERMATOLOGY

## 2024-01-29 NOTE — RESULT ENCOUNTER NOTE
DERMATOPATHOLOGY RESULT NOTE    Results reviewed by ordering physician.  Called patient to personally discuss results. No answer, left voicemail with result.      Instructions for Clinical Derm Team:   (remember to route Result Note to appropriate staff):    None    Result & Plan by Specimen:    Specimen A: benign porokeratosis  Plan: monitor and reassured, benign         Component   Case Report  Surgical Pathology Report                         Case: [id number]                                  Authorizing Provider:  Bridgette Carrera MD     Collected:           01/25/2024 1645              Ordering Location:     [facility]      Received:            01/25/2024 [address] [place] [address]                                                                Pathologist:           Bridgette Carrera MD                                                      Specimen:    Skin, Other, Specimen A: Left posterior calf                                            Final Diagnosis  A. Skin, Left posterior calf, Shave biopsy:  Porokeratosis.     Electronically signed by Bridgette Carrera MD on 1/29/2024 at  9:05 AM

## 2024-02-01 NOTE — ASSESSMENT & PLAN NOTE
I suspect this is due to BPH.  Will recheck PSA in about 4 months and I will notify him of results.   · Continue pancrelipase

## 2024-02-22 DIAGNOSIS — E84.9 CYSTIC FIBROSIS (HCC): ICD-10-CM

## 2024-02-22 RX ORDER — ALPRAZOLAM 1 MG/1
1 TABLET ORAL
Qty: 30 TABLET | Refills: 0 | Status: SHIPPED | OUTPATIENT
Start: 2024-02-22 | End: 2024-02-22 | Stop reason: SDUPTHER

## 2024-02-22 RX ORDER — OXYCODONE HYDROCHLORIDE 10 MG/1
10 TABLET ORAL EVERY 8 HOURS PRN
Qty: 90 TABLET | Refills: 0 | Status: SHIPPED | OUTPATIENT
Start: 2024-02-22 | End: 2024-02-22 | Stop reason: SDUPTHER

## 2024-02-22 RX ORDER — OXYCODONE HYDROCHLORIDE 10 MG/1
10 TABLET ORAL EVERY 8 HOURS PRN
Qty: 90 TABLET | Refills: 0 | Status: SHIPPED | OUTPATIENT
Start: 2024-02-22

## 2024-02-22 RX ORDER — ALPRAZOLAM 1 MG/1
1 TABLET ORAL
Qty: 30 TABLET | Refills: 0 | Status: SHIPPED | OUTPATIENT
Start: 2024-02-22

## 2024-03-06 ENCOUNTER — OFFICE VISIT (OUTPATIENT)
Dept: ENDOCRINOLOGY | Facility: CLINIC | Age: 49
End: 2024-03-06
Payer: COMMERCIAL

## 2024-03-06 VITALS
DIASTOLIC BLOOD PRESSURE: 86 MMHG | TEMPERATURE: 97.3 F | HEIGHT: 62 IN | BODY MASS INDEX: 21.53 KG/M2 | SYSTOLIC BLOOD PRESSURE: 126 MMHG | OXYGEN SATURATION: 98 % | WEIGHT: 117 LBS | HEART RATE: 76 BPM

## 2024-03-06 DIAGNOSIS — M85.80 OSTEOPENIA, UNSPECIFIED LOCATION: ICD-10-CM

## 2024-03-06 DIAGNOSIS — I27.20 PULMONARY HYPERTENSION (HCC): ICD-10-CM

## 2024-03-06 DIAGNOSIS — E10.22 TYPE 1 DIABETES MELLITUS WITH DIABETIC CHRONIC KIDNEY DISEASE, UNSPECIFIED CKD STAGE (HCC): Primary | ICD-10-CM

## 2024-03-06 PROCEDURE — 95251 CONT GLUC MNTR ANALYSIS I&R: CPT | Performed by: INTERNAL MEDICINE

## 2024-03-06 PROCEDURE — 99214 OFFICE O/P EST MOD 30 MIN: CPT | Performed by: INTERNAL MEDICINE

## 2024-03-06 RX ORDER — INSULIN PMP CART,AUT,G6/7,CNTR
1 EACH SUBCUTANEOUS EVERY OTHER DAY
Qty: 15 EACH | Refills: 3 | Status: SHIPPED | OUTPATIENT
Start: 2024-03-06

## 2024-03-06 RX ORDER — INSULIN PMP CART,AUT,G6/7,CNTR
1 EACH SUBCUTANEOUS CONTINUOUS
Qty: 1 KIT | Refills: 0 | Status: SHIPPED | OUTPATIENT
Start: 2024-03-06

## 2024-03-06 NOTE — PROGRESS NOTES
"  Follow-up Patient Progress Note      CC: Type 1 diabetes     History of Present Illness:   46 yr female with hx of cystic fibrosis diagnosed age 6 months s/p redo Lt lung transplant 3/2017 and follow up by [facility], recurrent PNA c pseudomonas and long term steroids, Type 1 diabetes, gastroparesis, pulmonary HTN, HTN, hx of DVT on apixaban, osteopenia being managed by PCP, CKD3 eGFR57,  anemia, chronic pain and chronic glucocorticoid and immunosuppressive therapy.  Last visit was 2/8/23.     Since last visit, weight loss 7 lbs.     She has been on insulin therapy for cystic fibrosis related diabetes for about 8 years associated with insulin deficiency as established at [facility].        CGM data review::  Device: dexcom g6         Dates:  2/22/24-3/6/24  Usage: 93 %       Av glu: 255 mg/dL                   SD:  mg/dL CV: 35  %        GMI:  %  TIR: 36 %                    TAR: 64 %             TBR: 0  %     Glycemic patters: Significant postprandial hyperglycemia.  Mild fasting hyperglycemia.  Hypoglycemia: No     Current meds:  Glargine 6u QHS  Humalog 5u - 7u - 8u plus 1 unit /50 mg/dL above 150 mg/dL.     Opthamology: No  Podiatry: No  Vaccination:    Dental:  Pancreatitis: yes     Ace/ARB: No  Statin:No  Thyroid issues: No     1/8/24 DXA: No comparison.  Z-score -0.3 LS, -2.1 LTH, -1.3 LFN.  Osteopenia.    Physical Exam:  Body mass index is 21.4 kg/m².  /86 (BP Location: Right arm, Patient Position: Sitting)   Pulse 76   Temp (!) 97.3 °F (36.3 °C) (Tympanic)   Ht 5' 2\" (1.575 m)   Wt 53.1 kg (117 lb)   SpO2 98%   BMI 21.40 kg/m²    Vitals:    03/06/24 1059   Weight: 53.1 kg (117 lb)        Physical Exam  Constitutional:       General: She is not in acute distress.     Appearance: She is well-developed.   HENT:      Head: Normocephalic and atraumatic.      Nose: Nose normal.   Eyes:      Conjunctiva/sclera: Conjunctivae normal.   Pulmonary:      Effort: Pulmonary effort is normal.   Abdominal:      General: " "There is no distension.   Musculoskeletal:      Cervical back: Normal range of motion and neck supple.   Skin:     Findings: No rash.      Comments: No icterus   Neurological:      Mental Status: She is alert and oriented to person, place, and time.         Labs:   Lab Results   Component Value Date    HGBA1C 8.0 (A) 01/15/2024       Lab Results   Component Value Date    TSH 1.61 09/14/2023       Lab Results   Component Value Date    CREATININE 1.19 (H) 01/12/2024    CREATININE 1.23 (H) 09/14/2023    CREATININE 1.15 08/30/2023    BUN 31 (H) 01/12/2024    K 4.7 01/12/2024     01/12/2024    CO2 28 01/12/2024     GFR, Calculated   Date Value Ref Range Status   11/23/2020 70 >60 mL/min/1.73m2 Final     Comment:     mL/min per 1.73 square meters                                            Normal Function or Mild Renal    Disease (if clinically at risk):  >or=60  Moderately Decreased:                30-59  Severely Decreased:                  15-29  Renal Failure:                         <15                                            -American GFR: multiply reported GFR by 1.16    Please note that the eGFR is based on the CKD-EPI calculation, and is not intended to be used for drug dosing.                                            Note: Calculated GFR may not be an accurate indicator of renal function if the patient's renal function is not in a steady state.     eGFRcr   Date Value Ref Range Status   01/12/2024 57 (L) >59 Final       Lab Results   Component Value Date    ALT 11 01/12/2024    AST 11 01/12/2024    ALKPHOS 73 01/12/2024       No results found for: \"CHOLESTEROL\"  No results found for: \"HDL\"  Lab Results   Component Value Date    TRIG 322 (H) 11/15/2021    TRIG 83 10/19/2021    TRIG 83 03/10/2020     No results found for: \"NONHDLC\"      Assessment/Plan:    Problem List Items Addressed This Visit          Endocrine    Type 1 diabetes mellitus with diabetic chronic kidney disease, unspecified CKD " stage (HCC) - Primary     She is uncontrolled with Av gluc 255mg/dL on last 2 weeks AGP.    Today we discussed options and agreed to change to   Humalog Insulin 5 7 8     Regular, Apidra, Humalog orNovolog Sliding Scale:   <80                      151-200 + 1 +1 +1     201-250 +2 +2 +2 +   251-300 +3 +3 +3 +   351-400 +4 +4 +4 +   >401 +5 +5 +5 +     Lantus Insulin  10u           We agreed to switch to Omnipod pump- Rx sent.  Currently not set up sharing dexcom.    Follow up in 3 months.      Lab Results   Component Value Date    HGBA1C 8.0 (A) 01/15/2024            Relevant Medications    Insulin Disposable Pump (Omnipod 5 G6 Intro, Gen 5,) KIT    Insulin Disposable Pump (Omnipod 5 G6 Pods, Gen 5,) MISC       Cardiovascular and Mediastinum    Pulmonary hypertension (HCC)       Musculoskeletal and Integument    Osteopenia     Discussed recent DXA. Given hx of chronic prednisone 10mg qdaily.  Does not meed guidelines for treatment as Zscore is >-3.0 but We may consider using either a bisphosphonate of prolia.    We discussed options and since she has GERD, we have to avoid oral bisphosphonate. May use Ibandronate or zolendronate at reduced dose due to eGFR 57.    May consider prolia in future.  We can review next visit.                I have spent a total time of 35 minutes on 03/06/24 in caring for this patient including greater than 50% of this time was spent in counseling/coordination of care as listed above.       Discussed with the patient and all questioned fully answered. She will contact me with concerns.    Eve Polk

## 2024-03-06 NOTE — PATIENT INSTRUCTIONS
Humalog Insulin 5 7 8     Regular, Apidra, Humalog orNovolog Sliding Scale:   <80                      151-200 + 1 +1 +1     201-250 +2 +2 +2 +   251-300 +3 +3 +3 +   351-400 +4 +4 +4 +   >401 +5 +5 +5 +      Lantus Insulin  10u

## 2024-03-07 NOTE — ASSESSMENT & PLAN NOTE
Discussed recent DXA. Given hx of chronic prednisone 10mg qdaily.  Does not meed guidelines for treatment as Zscore is >-3.0 but We may consider using either a bisphosphonate of prolia.    We discussed options and since she has GERD, we have to avoid oral bisphosphonate. May use Ibandronate or zolendronate at reduced dose due to eGFR 57.    May consider prolia in future.  We can review next visit.

## 2024-03-07 NOTE — ASSESSMENT & PLAN NOTE
She is uncontrolled with Av gluc 255mg/dL on last 2 weeks AGP.    Today we discussed options and agreed to change to   Humalog Insulin 5 7 8     Regular, Apidra, Humalog orNovolog Sliding Scale:   <80                      151-200 + 1 +1 +1     201-250 +2 +2 +2 +   251-300 +3 +3 +3 +   351-400 +4 +4 +4 +   >401 +5 +5 +5 +     Lantus Insulin  10u           We agreed to switch to Omnipod pump- Rx sent.  Currently not set up sharing dexcom.    Follow up in 3 months.      Lab Results   Component Value Date    HGBA1C 8.0 (A) 01/15/2024

## 2024-03-11 DIAGNOSIS — M54.16 LUMBAR RADICULOPATHY: ICD-10-CM

## 2024-03-11 DIAGNOSIS — G89.4 CHRONIC PAIN SYNDROME: ICD-10-CM

## 2024-03-11 DIAGNOSIS — M79.671 RIGHT FOOT PAIN: ICD-10-CM

## 2024-03-11 RX ORDER — PREGABALIN 150 MG/1
150 CAPSULE ORAL 2 TIMES DAILY
Qty: 180 CAPSULE | Refills: 1 | Status: SHIPPED | OUTPATIENT
Start: 2024-03-11

## 2024-03-14 ENCOUNTER — TELEPHONE (OUTPATIENT)
Age: 49
End: 2024-03-14

## 2024-03-14 NOTE — TELEPHONE ENCOUNTER
PA for Omnipod 5 G6 Intro (Gen 5) kit    Submitted via    [x]CMM-KEY [id number]  [][email or web address]-Case ID #   []Faxed to plan   []Other website   []Phone call Case ID #     Office notes sent, clinical questions answered. Awaiting determination    Turnaround time for your insurance to make a decision on your Prior Authorization can take 7-21 business days.

## 2024-03-15 NOTE — TELEPHONE ENCOUNTER
PA for Omnipod 5 G6 Intro (Gen 5) kit Approved   Date(s) approved 03/13/24-03/13/25      Patient advised by [x] [facility] Message                      [x] Phone call       Pharmacy advised by [x]Fax                                     []Phone call    Approval letter scanned into Media Yes

## 2024-03-21 DIAGNOSIS — R35.0 URINE FREQUENCY: ICD-10-CM

## 2024-03-21 RX ORDER — OXYBUTYNIN CHLORIDE 10 MG/1
10 TABLET, EXTENDED RELEASE ORAL
Qty: 90 TABLET | Refills: 1 | Status: SHIPPED | OUTPATIENT
Start: 2024-03-21

## 2024-04-01 ENCOUNTER — PREP FOR PROCEDURE (OUTPATIENT)
Dept: GASTROENTEROLOGY | Facility: CLINIC | Age: 49
End: 2024-04-01

## 2024-04-01 ENCOUNTER — OFFICE VISIT (OUTPATIENT)
Dept: GASTROENTEROLOGY | Facility: CLINIC | Age: 49
End: 2024-04-01
Payer: MEDICARE

## 2024-04-01 ENCOUNTER — TELEPHONE (OUTPATIENT)
Dept: GASTROENTEROLOGY | Facility: CLINIC | Age: 49
End: 2024-04-01

## 2024-04-01 VITALS
HEIGHT: 62 IN | WEIGHT: 118 LBS | DIASTOLIC BLOOD PRESSURE: 94 MMHG | BODY MASS INDEX: 21.71 KG/M2 | SYSTOLIC BLOOD PRESSURE: 154 MMHG | HEART RATE: 73 BPM

## 2024-04-01 DIAGNOSIS — Z86.010 HX OF ADENOMATOUS POLYP OF COLON: ICD-10-CM

## 2024-04-01 DIAGNOSIS — K31.84 GASTROPARESIS: ICD-10-CM

## 2024-04-01 DIAGNOSIS — K86.1 CHRONIC PANCREATITIS, UNSPECIFIED PANCREATITIS TYPE (HCC): ICD-10-CM

## 2024-04-01 DIAGNOSIS — R12 HEARTBURN: ICD-10-CM

## 2024-04-01 DIAGNOSIS — K86.89 PANCREATIC INSUFFICIENCY: ICD-10-CM

## 2024-04-01 DIAGNOSIS — Z12.11 SCREENING FOR COLON CANCER: ICD-10-CM

## 2024-04-01 DIAGNOSIS — K31.A11 INTESTINAL METAPLASIA OF ANTRUM OF STOMACH WITHOUT DYSPLASIA: ICD-10-CM

## 2024-04-01 DIAGNOSIS — K21.9 GASTROESOPHAGEAL REFLUX DISEASE, UNSPECIFIED WHETHER ESOPHAGITIS PRESENT: Primary | ICD-10-CM

## 2024-04-01 PROBLEM — Z86.0101 HX OF ADENOMATOUS POLYP OF COLON: Status: ACTIVE | Noted: 2024-04-01

## 2024-04-01 PROCEDURE — G2211 COMPLEX E/M VISIT ADD ON: HCPCS | Performed by: NURSE PRACTITIONER

## 2024-04-01 PROCEDURE — 99214 OFFICE O/P EST MOD 30 MIN: CPT | Performed by: NURSE PRACTITIONER

## 2024-04-01 RX ORDER — ONDANSETRON 4 MG/1
4 TABLET, FILM COATED ORAL
COMMUNITY
Start: 2024-03-21

## 2024-04-01 RX ORDER — DOXYCYCLINE HYCLATE 100 MG
100 TABLET ORAL 2 TIMES DAILY
COMMUNITY
Start: 2024-03-21

## 2024-04-01 NOTE — PROGRESS NOTES
[facility] - Outpatient Follow-up Note  Maximilian Mann 48 y.o. female MRN: [id number]  Encounter: [id number]          ASSESSMENT AND PLAN:      1. Gastroesophageal reflux disease, unspecified whether esophagitis present  2. Intestinal metaplasia of antrum of stomach without dysplasia  3. Gastroparesis  4. Heartburn  Patient has history of severe GERD associated with heartburn, s/p TIF, post fundoplication she was doing good but now is experiencing severe heartburn and intermittent episodes of acid reflux despite being on Dexilant 60 mg daily and famotidine 40 mg daily at bedtime.  Patient is concerned that her TIF may have loosened up.  Thank you patient also had a previous history of recurrent Candida esophagitis patient is immunocompromised due to history of lung transplant.  Patient only has a left lung and history of cystic fibrosis.  - EGD; scheduled for EGD.  Prep and procedure explained to patient in detail.  Further recommendations pending results of EGD.  -Continue Dexilant 60 mg daily  -Continue famotidine 40 mg daily at night  -Follow antireflux diet and measures  -Follow gastroparesis diet low fiber low residue low-fat 6 small meals a day  -Patient is on Eliquis and clearance for Eliquis will need to be obtained from Dr. Kurtz prior to procedure.     5. Chronic pancreatitis, unspecified pancreatitis type (HCC)  6.  Patient see pancreatic insufficiency  Patient has history of chronic pancreatitis with pancreatic insufficiency  -Continue Creon 48,000 units by mouth 3 times a day with meals    7. Screening for colon cancer  Up to date    8. Hx of adenomatous polyp of colon  History of adenomatous polyps. Colonoscopy done 6/29/2023 showed subcentimeter adenomatous appearing polyp in proximal ascending colon was removed. The ileocecal valve, cecum, hepatic flexure, transverse colon, splenic flexure, descending colon, sigmoid colon, rectosigmoid and rectum appeared normal.   Two small (grade 2) hemorrhoids  Suboptimal prep with liquid stool throughout the colon.Biopsy showed tubular adenomas.   -Surveillance colonoscopy due 6/2026    I obtained informed consent from the patient. The risks/benefits/alternatives of the procedure were discussed with the patient. Risks included, but not limited to, infection, bleeding, perforation, injury to organs in the abdomen, missed lesion and incomplete procedure were discussed. Patient was agreeable and electronic signature was obtained.     Follow up after procedure  ______________________________________________________________________    SUBJECTIVE:  Maximilian Benjamin is a pleasant 48-year-old female who presents to office for follow-up.  Patient was initially doing well after TIF procedure by started having recurrence of symptoms in September 2023.  Patient reports she continues to have intermittent episodes of acid reflux but has increase in severity of heartburn which is occurring on a daily basis.  Patient does drink coffee and does not follow gastroparesis diet. Patient does have a history of recurrent Candida esophagitis.  Patient is immunocompromise due to history of lung transplant.  Patient has history of cystic fibrosis and only has left lung.  She is on Prograf and prednisone.  Patient denies nausea, vomiting, dysphagia, epigastric or abdominal pain.  Patient denies blood in stool, blood from rectal area, or black tarry stool.  Bowel patterns are regular.  Denies diarrhea or constipation.  Abdomen exam benign no abdominal tenderness or guarding.    Colonoscopy done 6/29/2023 showed subcentimeter adenomatous appearing polyp in proximal ascending colon was removed. The ileocecal valve, cecum, hepatic flexure, transverse colon, splenic flexure, descending colon, sigmoid colon, rectosigmoid and rectum appeared normal.  Two small (grade 2) hemorrhoids  Suboptimal prep with liquid stool throughout the colon.Biopsy showed tubular adenomas.    EGD   done 6/29/2023 showed Mild, localized abnormal mucosa; performed cold forceps biopsies. S/p TIF, fundoplication site appeared normal, no evidence of reflux esophagitis. The duodenal bulb, 1st part of the duodenum and 2nd part of the duodenum appeared normal. Mild Candida esophagitis, fundoplication site appeared normal. Stomach, antrum biospy (r/o H. pylori):Gastric antral-type mucosa with focal intestinal metaplasia.Negative for dysplasia or carcinoma. An H pylori immunohistochemical stain is negative. Esophagus, lower esophagus biopsy for candida: Squamous mucosa with acute esophagitis and fungal organisms.    REVIEW OF SYSTEMS IS OTHERWISE NEGATIVE.      Historical Information   Past Medical History:   Diagnosis Date    Abnormal uterine bleeding (AUB)     ABPA (allergic bronchopulmonary aspergillosis) ([facility]) 07/08/2012    Acute kidney injury ([facility]) 09/08/2017    Acute on chronic respiratory failure ([facility]) 03/04/2017    Last Assessment & Plan:  Formatting of this note might be different from the original. Recurrent with clinical worsening with enlarging PTX, s/p multiple chest tubes, but new fevers 6/19 and worsening leukocytosis indicating infection as cause of 6/19 decompensation with profound hypoxia in setting of shunting through right lung and continued pneumothorax s/p 2nd chest tube placement.  6/27: Right    Anaphylaxis 06/06/2022    Anemia     Asthma     Chest pain 09/21/2021    Chronic pain     Chronic respiratory failure with hypoxia ([facility]) 09/07/2021    COVID-19 06/03/2022    Cystic fibrosis ([facility])     Deep vein thrombosis (HCC)     Diabetes mellitus (HCC)     GERD (gastroesophageal reflux disease)     HBP (high blood pressure)     HCAP (healthcare-associated pneumonia) 03/06/2020    History of transfusion     Influenza B 02/23/2020    Kidney stone     Neutropenic fever  ([facility]) 09/26/2020    Pneumonia due to Pseudomonas species ([facility]) 10/28/2010    Last Assessment & Plan:  Formatting of this note might be  different from the original. Polymicrobial pna w/ pseudomonas, M. Abscessus, Cadida glabrata/albicans    PONV (postoperative nausea and vomiting)     Pseudomonas aeruginosa infection 09/20/2012    Renal calculus     Squamous cell skin cancer 01/12/2023    SCCIS- Left upper thigh    Transplant recipient     Lung- 2013, 2017    Ulcerative colitis (HCC)     Wellness examination 02/04/2013    Formatting of this note might be different from the original. Discussed with patient and acknowledgement form signed on 2/4/2013 (ALYSHA)     Past Surgical History:   Procedure Laterality Date    APPENDECTOMY      AUGMENTATION MAMMAPLASTY Bilateral 2004    AUGMENTATION MAMMAPLASTY Right 2021    removed     BREAST SURGERY      Aug.     BRONCHOSCOPY      CHOLECYSTECTOMY      COLONOSCOPY      EGD  07/2019    [facility] Infect.     FL RETROGRADE PYELOGRAM  01/09/2020    FL RETROGRADE PYELOGRAM  02/04/2020    GASTROSCOPY  03/01/2024    GASTROSTOMY TUBE, PLACE      IR BALLOON-OCCLUDED ANTEGRADE TRANSVENOUS OBLITERATION (BATO)  11/22/2022    IR PORT PLACEMENT  03/22/2022    IR PORT REMOVAL  01/10/2022    IR PORT STRIPPING  11/25/2022    IR TUNNELED CENTRAL LINE PLACEMENT  01/13/2022    LUNG SURGERY      LUNG TRANSPLANT      X2    MD CYSTO BLADDER W/URETERAL CATHETERIZATION Right 01/09/2020    Procedure: CYSTOSCOPY RETROGRADE PYELOGRAM WITH INSERTION STENT URETERAL;  Surgeon: Patricio Perry MD;  Location: [facility];  Service: Urology    MD CYSTO/URETERO W/LITHOTRIPSY &INDWELL STENT INSRT Right 02/04/2020    Procedure: CYSTOSCOPY URETEROSCOPY WITH LITHOTRIPSY HOLMIUM LASER, RETROGRADE PYELOGRAM AND INSERTION STENT URETERAL;  Surgeon: Patricio Perry MD;  Location: [facility];  Service: Urology    SKIN BIOPSY      US GUIDED VASCULAR ACCESS  01/17/2017     Social History   Social History     Substance and Sexual Activity   Alcohol Use Yes    Alcohol/week: 2.0 standard drinks of alcohol    Types: 2 Glasses of wine per week    Comment:  social     Social History     Substance and Sexual Activity   Drug Use Yes    Frequency: 2.0 times per week    Types: Marijuana    Comment: medical edible marijuana prescribed     Social History     Tobacco Use   Smoking Status Former    Current packs/day: 0.00    Average packs/day: 0.5 packs/day for 12.0 years (6.0 ttl pk-yrs)    Types: Cigarettes    Start date: 1993    Quit date: 2005    Years since quittin.2    Passive exposure: Past   Smokeless Tobacco Never     Family History   Problem Relation Age of Onset    Alcohol abuse Mother     Mental illness Mother     Depression Mother     Alcohol abuse Father     COPD Maternal Grandmother     Lung cancer Maternal Grandmother 65    Cancer Maternal Grandmother     COPD Maternal Grandfather     Cancer Maternal Grandfather     COPD Paternal Grandmother     Lung cancer Paternal Grandmother 65    No Known Problems Sister     No Known Problems Paternal Aunt     Breast cancer Neg Hx     Ovarian cancer Neg Hx     Colon cancer Neg Hx        Meds/Allergies       Current Outpatient Medications:     acetaminophen (TYLENOL) 325 mg tablet    albuterol (2.5 mg/3 mL) 0.083 % nebulizer solution    albuterol (PROVENTIL HFA,VENTOLIN HFA) 90 mcg/act inhaler    ALPRAZolam (XANAX) 1 mg tablet    apixaban (ELIQUIS) 5 mg    Ascorbic Acid (vitamin C) 100 MG tablet    atovaquone (MEPRON) 750 mg/5 mL suspension    azithromycin (ZITHROMAX) 250 mg tablet    BD Pen Needle Abi U/F 32G X 4 MM MISC    bisacodyl (DULCOLAX) 5 mg EC tablet    Cholecalciferol (Vitamin D3) 125 MCG (5000 UT) TABS    Continuous Blood Gluc  (Dexcom G7 ) SUKHI    Continuous Blood Gluc Sensor (Dexcom G7 Sensor)    dexlansoprazole (DEXILANT) 60 MG capsule    doxycycline hyclate (VIBRA-TABS) 100 mg tablet    dronabinol (MARINOL) 5 MG capsule    famotidine (PEPCID) 40 MG tablet    insulin aspart (NovoLOG FlexPen) 100 UNIT/ML injection pen    Insulin Disposable Pump (Omnipod 5 G6 Intro, Gen 5,) KIT     "Insulin Disposable Pump (Omnipod 5 G6 Pods, Gen 5,) MISC    Insulin Pen Needle (Comfort EZ Pen Needles) 33G X 4 MM MISC    multivitamin (THERAGRAN) TABS    naloxone (NARCAN) 4 mg/0.1 mL nasal spray    ondansetron (ZOFRAN) 4 mg tablet    oxybutynin (DITROPAN-XL) 10 MG 24 hr tablet    oxyCODONE (ROXICODONE) 10 MG TABS    pancrelipase, Lip-Prot-Amyl, (CREON) 24,000 units    polyethylene glycol (MIRALAX) 17 g packet    predniSONE 10 mg tablet    pregabalin (LYRICA) 150 mg capsule    promethazine-dextromethorphan (PHENERGAN-DM) 6.25-15 mg/5 mL oral syrup    Respiratory Therapy Supplies (Nebulizer) SUKHI    Semglee, yfgn, 100 UNIT/ML SOPN    tacrolimus (PROGRAF) 1 mg capsule    Turmeric 500 MG CAPS    valACYclovir (VALTREX) 500 mg tablet    vitamin B-12 (VITAMIN B-12) 1,000 mcg tablet    Allergies   Allergen Reactions    Bebtelovimab Anaphylaxis    Vancomycin Angioedema and Hives    Gabapentin Hallucinations    Ceftazidime Headache     Severe headaches after desensitization    Nsaids Other (See Comments)     Lung transplant increases risk of renal toxicity, call lung txp provider to discuss if needed           Objective     Blood pressure 154/94, pulse 73, height 5' 2\" (1.575 m), weight 53.5 kg (118 lb), not currently breastfeeding. Body mass index is 21.58 kg/m².      PHYSICAL EXAM:      General Appearance:   Alert, cooperative, no distress   HEENT:   Normocephalic, atraumatic, anicteric.     Neck:  Supple, symmetrical, trachea midline   Lungs:   Clear to auscultation bilaterally; no rales, rhonchi or wheezing; respirations unlabored    Heart::   Regular rate and rhythm; no murmur, rub, or gallop.   Abdomen:   Soft, non-tender, non-distended; normal bowel sounds; no masses, no organomegaly    Genitalia:   Deferred    Rectal:   Deferred    Extremities:  No cyanosis, clubbing or edema    Pulses:  2+ and symmetric    Skin:  No jaundice, rashes, or lesions    Lymph nodes:  No palpable cervical lymphadenopathy        Lab " Results:   No visits with results within 1 Day(s) from this visit.   Latest known visit with results is:   Office Visit on 01/25/2024   Component Date Value    Case Report 01/25/2024                      Value:Surgical Pathology Report                         Case: [id number]                                  Authorizing Provider:  Bridgette Carrera MD     Collected:           01/25/2024 1646              Ordering Location:     [facility]      Received:            01/25/2024 1649                                     [place]                                                                Pathologist:           Bridgette Carrera MD                                                       Specimen:    Skin, Other, Specimen A: Left posterior calf                                               Final Diagnosis 01/25/2024                      Value:This result contains rich text formatting which cannot be displayed here.    Additional Information 01/25/2024                      Value:This result contains rich text formatting which cannot be displayed here.    Gross Description 01/25/2024                      Value:This result contains rich text formatting which cannot be displayed here.    Clinical Information 01/25/2024                      Value:Specimen A: 48 year old female with history of BCC/SCC; skin;shave biopsy; left posterior calf, 4 mm pink scaly papule. Differential diagnosis: Basal cell carcinoma vs squamous cell carcinoma vs actinic keratosis vs benign keratosis/porokeratosis      ATTN: PRATEEK CARRERA         Radiology Results:   No results found.

## 2024-04-01 NOTE — PATIENT INSTRUCTIONS
Follow antireflux diet and measures limit coffee to 1-2 8 ounce cups daily, avoid any additional coffee, chocolate, carbonated beverages, fried fatty foods, and tomato based products.  Do not eat for 3 hours prior to going to bed, sleep with your head of bed elevated on multiple pillows, stay sitting upright for 1 hour after eating.  Follow gastroparesis diet 6 small meals a day low fiber/low residue, low fat.   Low Fiber Diet   WHAT YOU NEED TO KNOW:   What is a low-fiber diet?  A low-fiber diet limits foods that are high in fiber. Fiber is the part of fruits, vegetables, and grains that is not broken down by your body. You may need to follow this diet after surgery on your intestines. You may also need to follow this diet for certain conditions such as Crohn disease or ulcerative colitis. Ask your healthcare provider or dietitian how much fiber you can have each day.       What foods can I include?  Read food labels to check the amount of fiber that are found in foods. Some foods that are low in fiber include the following:  Grains:  Choose grains that have less than 2 grams of fiber in each serving. Examples include the following:    Cream of wheat and finely ground grits    Dry cereal made from rice    White bread, white pasta, and white rice    Crackers, bagels, and rolls made from white or refined flour    Other foods:      Canned and well-cooked fruit without skins or seeds, and juice without pulp    Ripe bananas and melons    Canned and well-cooked vegetables without skins or seeds, and vegetable juice    Cow's milk, lactose-free milk, soy milk, and rice milk    Yogurt without nuts, fruit, or granola    Eggs, poultry (such as chicken and turkey), fish, and tender, ground, well-cooked beef    Tofu and smooth peanut butter    Broth and strained soups made of low-fiber foods    What foods should I avoid?   Breads, cereals, crackers, and pasta made with whole wheat or whole grains (such as whole oats)    Brown  rice, wild rice, quinoa, kasha, and barley    All fresh fruit with skin, except banana and melons    Dried fruits and fruit juice with pulp    Canned pineapple    Raw vegetables    Nuts, seeds, and popcorn    Beans, nuts, peas, and lentils    Tough meats    Coconut and avocado    What else do I need to know about a low-fiber diet?  A low-fiber diet can decrease the amount of bowel movements you have. Drink liquids as directed to avoid constipation. Ask how much liquid to drink each day and which liquids are best for you.  CARE AGREEMENT:   You have the right to help plan your care. Discuss treatment options with your healthcare provider to decide what care you want to receive. You always have the right to refuse treatment. The above information is an  only. It is not intended as medical advice for individual conditions or treatments. Talk to your doctor, nurse or pharmacist before following any medical regimen to see if it is safe and effective for you.  © Copyright [employer] 2023 Information is for End User's use only and may not be sold, redistributed or otherwise used for commercial purposes.

## 2024-04-01 NOTE — H&P (VIEW-ONLY)
[facility] - Outpatient Follow-up Note  Maximilian Mann 48 y.o. female MRN: [id number]  Encounter: [id number]          ASSESSMENT AND PLAN:      1. Gastroesophageal reflux disease, unspecified whether esophagitis present  2. Intestinal metaplasia of antrum of stomach without dysplasia  3. Gastroparesis  4. Heartburn  Patient has history of severe GERD associated with heartburn, s/p TIF, post fundoplication she was doing good but now is experiencing severe heartburn and intermittent episodes of acid reflux despite being on Dexilant 60 mg daily and famotidine 40 mg daily at bedtime.  Patient is concerned that her TIF may have loosened up.  Thank you patient also had a previous history of recurrent Candida esophagitis patient is immunocompromised due to history of lung transplant.  Patient only has a left lung and history of cystic fibrosis.  - EGD; scheduled for EGD.  Prep and procedure explained to patient in detail.  Further recommendations pending results of EGD.  -Continue Dexilant 60 mg daily  -Continue famotidine 40 mg daily at night  -Follow antireflux diet and measures  -Follow gastroparesis diet low fiber low residue low-fat 6 small meals a day  -Patient is on Eliquis and clearance for Eliquis will need to be obtained from Dr. Kurtz prior to procedure.     5. Chronic pancreatitis, unspecified pancreatitis type (HCC)  6.  Patient see pancreatic insufficiency  Patient has history of chronic pancreatitis with pancreatic insufficiency  -Continue Creon 48,000 units by mouth 3 times a day with meals    7. Screening for colon cancer  Up to date    8. Hx of adenomatous polyp of colon  History of adenomatous polyps. Colonoscopy done 6/29/2023 showed subcentimeter adenomatous appearing polyp in proximal ascending colon was removed. The ileocecal valve, cecum, hepatic flexure, transverse colon, splenic flexure, descending colon, sigmoid colon, rectosigmoid and rectum appeared normal.   Two small (grade 2) hemorrhoids  Suboptimal prep with liquid stool throughout the colon.Biopsy showed tubular adenomas.   -Surveillance colonoscopy due 6/2026    I obtained informed consent from the patient. The risks/benefits/alternatives of the procedure were discussed with the patient. Risks included, but not limited to, infection, bleeding, perforation, injury to organs in the abdomen, missed lesion and incomplete procedure were discussed. Patient was agreeable and electronic signature was obtained.     Follow up after procedure  ______________________________________________________________________    SUBJECTIVE:  Maximilian Benjamin is a pleasant 48-year-old female who presents to office for follow-up.  Patient was initially doing well after TIF procedure by started having recurrence of symptoms in September 2023.  Patient reports she continues to have intermittent episodes of acid reflux but has increase in severity of heartburn which is occurring on a daily basis.  Patient does drink coffee and does not follow gastroparesis diet. Patient does have a history of recurrent Candida esophagitis.  Patient is immunocompromise due to history of lung transplant.  Patient has history of cystic fibrosis and only has left lung.  She is on Prograf and prednisone.  Patient denies nausea, vomiting, dysphagia, epigastric or abdominal pain.  Patient denies blood in stool, blood from rectal area, or black tarry stool.  Bowel patterns are regular.  Denies diarrhea or constipation.  Abdomen exam benign no abdominal tenderness or guarding.    Colonoscopy done 6/29/2023 showed subcentimeter adenomatous appearing polyp in proximal ascending colon was removed. The ileocecal valve, cecum, hepatic flexure, transverse colon, splenic flexure, descending colon, sigmoid colon, rectosigmoid and rectum appeared normal.  Two small (grade 2) hemorrhoids  Suboptimal prep with liquid stool throughout the colon.Biopsy showed tubular adenomas.    EGD   done 6/29/2023 showed Mild, localized abnormal mucosa; performed cold forceps biopsies. S/p TIF, fundoplication site appeared normal, no evidence of reflux esophagitis. The duodenal bulb, 1st part of the duodenum and 2nd part of the duodenum appeared normal. Mild Candida esophagitis, fundoplication site appeared normal. Stomach, antrum biospy (r/o H. pylori):Gastric antral-type mucosa with focal intestinal metaplasia.Negative for dysplasia or carcinoma. An H pylori immunohistochemical stain is negative. Esophagus, lower esophagus biopsy for candida: Squamous mucosa with acute esophagitis and fungal organisms.    REVIEW OF SYSTEMS IS OTHERWISE NEGATIVE.      Historical Information   Past Medical History:   Diagnosis Date    Abnormal uterine bleeding (AUB)     ABPA (allergic bronchopulmonary aspergillosis) ([facility]) 07/08/2012    Acute kidney injury ([facility]) 09/08/2017    Acute on chronic respiratory failure ([facility]) 03/04/2017    Last Assessment & Plan:  Formatting of this note might be different from the original. Recurrent with clinical worsening with enlarging PTX, s/p multiple chest tubes, but new fevers 6/19 and worsening leukocytosis indicating infection as cause of 6/19 decompensation with profound hypoxia in setting of shunting through right lung and continued pneumothorax s/p 2nd chest tube placement.  6/27: Right    Anaphylaxis 06/06/2022    Anemia     Asthma     Chest pain 09/21/2021    Chronic pain     Chronic respiratory failure with hypoxia ([facility]) 09/07/2021    COVID-19 06/03/2022    Cystic fibrosis ([facility])     Deep vein thrombosis (HCC)     Diabetes mellitus (HCC)     GERD (gastroesophageal reflux disease)     HBP (high blood pressure)     HCAP (healthcare-associated pneumonia) 03/06/2020    History of transfusion     Influenza B 02/23/2020    Kidney stone     Neutropenic fever  ([facility]) 09/26/2020    Pneumonia due to Pseudomonas species ([facility]) 10/28/2010    Last Assessment & Plan:  Formatting of this note might be  different from the original. Polymicrobial pna w/ pseudomonas, M. Abscessus, Cadida glabrata/albicans    PONV (postoperative nausea and vomiting)     Pseudomonas aeruginosa infection 09/20/2012    Renal calculus     Squamous cell skin cancer 01/12/2023    SCCIS- Left upper thigh    Transplant recipient     Lung- 2013, 2017    Ulcerative colitis (HCC)     Wellness examination 02/04/2013    Formatting of this note might be different from the original. Discussed with patient and acknowledgement form signed on 2/4/2013 (ALYSHA)     Past Surgical History:   Procedure Laterality Date    APPENDECTOMY      AUGMENTATION MAMMAPLASTY Bilateral 2004    AUGMENTATION MAMMAPLASTY Right 2021    removed     BREAST SURGERY      Aug.     BRONCHOSCOPY      CHOLECYSTECTOMY      COLONOSCOPY      EGD  07/2019    Yeast Infect.     FL RETROGRADE PYELOGRAM  01/09/2020    FL RETROGRADE PYELOGRAM  02/04/2020    GASTROSCOPY  03/01/2024    GASTROSTOMY TUBE, PLACE      IR BALLOON-OCCLUDED ANTEGRADE TRANSVENOUS OBLITERATION (BATO)  11/22/2022    IR PORT PLACEMENT  03/22/2022    IR PORT REMOVAL  01/10/2022    IR PORT STRIPPING  11/25/2022    IR TUNNELED CENTRAL LINE PLACEMENT  01/13/2022    LUNG SURGERY      LUNG TRANSPLANT      X2    LA CYSTO BLADDER W/URETERAL CATHETERIZATION Right 01/09/2020    Procedure: CYSTOSCOPY RETROGRADE PYELOGRAM WITH INSERTION STENT URETERAL;  Surgeon: Patricio Perry MD;  Location: [facility];  Service: Urology    LA CYSTO/URETERO W/LITHOTRIPSY &INDWELL STENT INSRT Right 02/04/2020    Procedure: CYSTOSCOPY URETEROSCOPY WITH LITHOTRIPSY HOLMIUM LASER, RETROGRADE PYELOGRAM AND INSERTION STENT URETERAL;  Surgeon: Patricio Perry MD;  Location: [facility];  Service: Urology    SKIN BIOPSY      US GUIDED VASCULAR ACCESS  01/17/2017     Social History   Social History     Substance and Sexual Activity   Alcohol Use Yes    Alcohol/week: 2.0 standard drinks of alcohol    Types: 2 Glasses of wine per week    Comment:  social     Social History     Substance and Sexual Activity   Drug Use Yes    Frequency: 2.0 times per week    Types: Marijuana    Comment: medical edible marijuana prescribed     Social History     Tobacco Use   Smoking Status Former    Current packs/day: 0.00    Average packs/day: 0.5 packs/day for 12.0 years (6.0 ttl pk-yrs)    Types: Cigarettes    Start date: 1993    Quit date: 2005    Years since quittin.2    Passive exposure: Past   Smokeless Tobacco Never     Family History   Problem Relation Age of Onset    Alcohol abuse Mother     Mental illness Mother     Depression Mother     Alcohol abuse Father     COPD Maternal Grandmother     Lung cancer Maternal Grandmother 65    Cancer Maternal Grandmother     COPD Maternal Grandfather     Cancer Maternal Grandfather     COPD Paternal Grandmother     Lung cancer Paternal Grandmother 65    No Known Problems Sister     No Known Problems Paternal Aunt     Breast cancer Neg Hx     Ovarian cancer Neg Hx     Colon cancer Neg Hx        Meds/Allergies       Current Outpatient Medications:     acetaminophen (TYLENOL) 325 mg tablet    albuterol (2.5 mg/3 mL) 0.083 % nebulizer solution    albuterol (PROVENTIL HFA,VENTOLIN HFA) 90 mcg/act inhaler    ALPRAZolam (XANAX) 1 mg tablet    apixaban (ELIQUIS) 5 mg    Ascorbic Acid (vitamin C) 100 MG tablet    atovaquone (MEPRON) 750 mg/5 mL suspension    azithromycin (ZITHROMAX) 250 mg tablet    BD Pen Needle Abi U/F 32G X 4 MM MISC    bisacodyl (DULCOLAX) 5 mg EC tablet    Cholecalciferol (Vitamin D3) 125 MCG (5000 UT) TABS    Continuous Blood Gluc  (Dexcom G7 ) SUKHI    Continuous Blood Gluc Sensor (Dexcom G7 Sensor)    dexlansoprazole (DEXILANT) 60 MG capsule    doxycycline hyclate (VIBRA-TABS) 100 mg tablet    dronabinol (MARINOL) 5 MG capsule    famotidine (PEPCID) 40 MG tablet    insulin aspart (NovoLOG FlexPen) 100 UNIT/ML injection pen    Insulin Disposable Pump (Omnipod 5 G6 Intro, Gen 5,) KIT     "Insulin Disposable Pump (Omnipod 5 G6 Pods, Gen 5,) MISC    Insulin Pen Needle (Comfort EZ Pen Needles) 33G X 4 MM MISC    multivitamin (THERAGRAN) TABS    naloxone (NARCAN) 4 mg/0.1 mL nasal spray    ondansetron (ZOFRAN) 4 mg tablet    oxybutynin (DITROPAN-XL) 10 MG 24 hr tablet    oxyCODONE (ROXICODONE) 10 MG TABS    pancrelipase, Lip-Prot-Amyl, (CREON) 24,000 units    polyethylene glycol (MIRALAX) 17 g packet    predniSONE 10 mg tablet    pregabalin (LYRICA) 150 mg capsule    promethazine-dextromethorphan (PHENERGAN-DM) 6.25-15 mg/5 mL oral syrup    Respiratory Therapy Supplies (Nebulizer) SUKHI    Semglee, yfgn, 100 UNIT/ML SOPN    tacrolimus (PROGRAF) 1 mg capsule    Turmeric 500 MG CAPS    valACYclovir (VALTREX) 500 mg tablet    vitamin B-12 (VITAMIN B-12) 1,000 mcg tablet    Allergies   Allergen Reactions    Bebtelovimab Anaphylaxis    Vancomycin Angioedema and Hives    Gabapentin Hallucinations    Ceftazidime Headache     Severe headaches after desensitization    Nsaids Other (See Comments)     Lung transplant increases risk of renal toxicity, call lung txp provider to discuss if needed           Objective     Blood pressure 154/94, pulse 73, height 5' 2\" (1.575 m), weight 53.5 kg (118 lb), not currently breastfeeding. Body mass index is 21.58 kg/m².      PHYSICAL EXAM:      General Appearance:   Alert, cooperative, no distress   HEENT:   Normocephalic, atraumatic, anicteric.     Neck:  Supple, symmetrical, trachea midline   Lungs:   Clear to auscultation bilaterally; no rales, rhonchi or wheezing; respirations unlabored    Heart::   Regular rate and rhythm; no murmur, rub, or gallop.   Abdomen:   Soft, non-tender, non-distended; normal bowel sounds; no masses, no organomegaly    Genitalia:   Deferred    Rectal:   Deferred    Extremities:  No cyanosis, clubbing or edema    Pulses:  2+ and symmetric    Skin:  No jaundice, rashes, or lesions    Lymph nodes:  No palpable cervical lymphadenopathy        Lab " Results:   No visits with results within 1 Day(s) from this visit.   Latest known visit with results is:   Office Visit on 01/25/2024   Component Date Value    Case Report 01/25/2024                      Value:Surgical Pathology Report                         Case: [id number]                                  Authorizing Provider:  Bridgette Carrera MD     Collected:           01/25/2024 1642              Ordering Location:     [facility]      Received:            01/25/2024 1649                                     [place]                                                                Pathologist:           Bridgette Carrera MD                                                       Specimen:    Skin, Other, Specimen A: Left posterior calf                                               Final Diagnosis 01/25/2024                      Value:This result contains rich text formatting which cannot be displayed here.    Additional Information 01/25/2024                      Value:This result contains rich text formatting which cannot be displayed here.    Gross Description 01/25/2024                      Value:This result contains rich text formatting which cannot be displayed here.    Clinical Information 01/25/2024                      Value:Specimen A: 48 year old female with history of BCC/SCC; skin;shave biopsy; left posterior calf, 4 mm pink scaly papule. Differential diagnosis: Basal cell carcinoma vs squamous cell carcinoma vs actinic keratosis vs benign keratosis/porokeratosis      ATTN: PRATEEK CARRERA         Radiology Results:   No results found.

## 2024-04-01 NOTE — TELEPHONE ENCOUNTER
Scheduled date of EGD(as of today): 4/23/24  Physician performing EGD: Dr. Ko  Location of EGD:   Instructions reviewed with patient by: tl given at appt  Clearances:     Eliquis- 2 day hold    Diabetic-  Novolog  Long Acting Insulin  Adrianne Kurtz (pt said nurse practioner might be easier to get win of. NP- Frances London.)

## 2024-04-01 NOTE — TELEPHONE ENCOUNTER
Faxed [facility] clearance to Dr Kurtz [phone] and [phone].  Will call their office in one week to make sure received [phone].

## 2024-04-02 DIAGNOSIS — E84.9 CYSTIC FIBROSIS (HCC): ICD-10-CM

## 2024-04-02 RX ORDER — OXYCODONE HYDROCHLORIDE 10 MG/1
10 TABLET ORAL EVERY 8 HOURS PRN
Qty: 90 TABLET | Refills: 0 | Status: SHIPPED | OUTPATIENT
Start: 2024-04-02

## 2024-04-02 RX ORDER — ALPRAZOLAM 1 MG/1
1 TABLET ORAL
Qty: 30 TABLET | Refills: 0 | Status: SHIPPED | OUTPATIENT
Start: 2024-04-02

## 2024-04-02 NOTE — TELEPHONE ENCOUNTER
Received Eliquis clearance back from Dr Kurtz.  Pt is cleared to hold Eliquis 2 days prior to the procedure.  Will call pt to inform. Will have scanned.

## 2024-04-08 ENCOUNTER — TREATMENT (OUTPATIENT)
Dept: OTHER | Facility: HOSPITAL | Age: 49
End: 2024-04-08
Payer: COMMERCIAL

## 2024-04-08 DIAGNOSIS — E10.65 TYPE 1 DIABETES MELLITUS WITH HYPERGLYCEMIA (HCC): Primary | ICD-10-CM

## 2024-04-08 PROCEDURE — 95251 CONT GLUC MNTR ANALYSIS I&R: CPT | Performed by: INTERNAL MEDICINE

## 2024-04-08 NOTE — PROGRESS NOTES
CGM data review::  Device: dexcom Dates: 3/30/24-4/2/24  Usage: 93 % Av glu: 183  mg/dL  SD: 73 mg/dL CV:   % GMI:   %  TIR: 63 %  TAR: 18+19 %  TBR:   %    Glycemic patters:  significant pp hyperglycemia after dinner otherwise great control.  Hypoglycemia: No    Recommendation:  Increase dinnertime insulin to 10u and review again in 4-6 weeks. With dexcom report.    Humalog Insulin 5 7 10     Regular, Apidra, Humalog orNovolog Sliding Scale:   <80                      151-200 + 1 +1 +1     201-250 +2 +2 +2 +   251-300 +3 +3 +3 +   351-400 +4 +4 +4 +   >401 +5 +5 +5 +      Lantus Insulin  10u

## 2024-04-15 ENCOUNTER — PATIENT MESSAGE (OUTPATIENT)
Dept: UROLOGY | Facility: CLINIC | Age: 49
End: 2024-04-15

## 2024-04-15 ENCOUNTER — OFFICE VISIT (OUTPATIENT)
Dept: INTERNAL MEDICINE CLINIC | Facility: CLINIC | Age: 49
End: 2024-04-15
Payer: COMMERCIAL

## 2024-04-15 VITALS
DIASTOLIC BLOOD PRESSURE: 88 MMHG | HEART RATE: 83 BPM | BODY MASS INDEX: 20.55 KG/M2 | SYSTOLIC BLOOD PRESSURE: 148 MMHG | HEIGHT: 63 IN | TEMPERATURE: 97.7 F | RESPIRATION RATE: 16 BRPM | WEIGHT: 116 LBS

## 2024-04-15 DIAGNOSIS — I26.99 ACUTE PULMONARY EMBOLISM, UNSPECIFIED PULMONARY EMBOLISM TYPE, UNSPECIFIED WHETHER ACUTE COR PULMONALE PRESENT (HCC): ICD-10-CM

## 2024-04-15 DIAGNOSIS — E84.9 CYSTIC FIBROSIS (HCC): ICD-10-CM

## 2024-04-15 DIAGNOSIS — E10.22 TYPE 1 DIABETES MELLITUS WITH DIABETIC CHRONIC KIDNEY DISEASE, UNSPECIFIED CKD STAGE (HCC): ICD-10-CM

## 2024-04-15 DIAGNOSIS — R35.0 URINE FREQUENCY: ICD-10-CM

## 2024-04-15 DIAGNOSIS — I10 ESSENTIAL HYPERTENSION: Primary | ICD-10-CM

## 2024-04-15 DIAGNOSIS — I82.401 ACUTE DEEP VEIN THROMBOSIS (DVT) OF RIGHT LOWER EXTREMITY, UNSPECIFIED VEIN (HCC): ICD-10-CM

## 2024-04-15 DIAGNOSIS — M85.80 OSTEOPENIA, UNSPECIFIED LOCATION: ICD-10-CM

## 2024-04-15 DIAGNOSIS — K21.00 GASTROESOPHAGEAL REFLUX DISEASE WITH ESOPHAGITIS WITHOUT HEMORRHAGE: ICD-10-CM

## 2024-04-15 DIAGNOSIS — R05.1 ACUTE COUGH: ICD-10-CM

## 2024-04-15 PROBLEM — Z86.010 HX OF ADENOMATOUS POLYP OF COLON: Status: RESOLVED | Noted: 2024-04-01 | Resolved: 2024-04-15

## 2024-04-15 PROBLEM — Z86.0101 HX OF ADENOMATOUS POLYP OF COLON: Status: RESOLVED | Noted: 2024-04-01 | Resolved: 2024-04-15

## 2024-04-15 PROBLEM — Z12.11 SCREENING FOR COLON CANCER: Status: RESOLVED | Noted: 2023-06-05 | Resolved: 2024-04-15

## 2024-04-15 PROBLEM — R06.09 DYSPNEA ON EXERTION: Status: RESOLVED | Noted: 2023-08-28 | Resolved: 2024-04-15

## 2024-04-15 PROBLEM — R12 HEARTBURN: Status: RESOLVED | Noted: 2024-04-01 | Resolved: 2024-04-15

## 2024-04-15 PROCEDURE — 99214 OFFICE O/P EST MOD 30 MIN: CPT | Performed by: INTERNAL MEDICINE

## 2024-04-15 RX ORDER — DEXTROMETHORPHAN HYDROBROMIDE AND PROMETHAZINE HYDROCHLORIDE 15; 6.25 MG/5ML; MG/5ML
5 SYRUP ORAL 4 TIMES DAILY PRN
Qty: 180 ML | Refills: 0 | Status: SHIPPED | OUTPATIENT
Start: 2024-04-15

## 2024-04-15 NOTE — ASSESSMENT & PLAN NOTE
Elevated last 2 visits, historically controlled.  Not on anti-hypertensive regimen.  Will monitor closely, if continues to be elevated with SBP >140 will discuss starting a low dose anti-hypertensive.  She will be back in the office for annual wellness visit in 1-3 months.

## 2024-04-15 NOTE — PROGRESS NOTES
Name: Maximilian Mann      : 1975      MRN: [id number]  Encounter Provider: Solis Avila MD  Encounter Date: 4/15/2024   Encounter department: [facility] INTERNAL MEDICINE [place]    Assessment & Plan     1. Essential hypertension  Assessment & Plan:  Elevated last 2 visits, historically controlled.  Not on anti-hypertensive regimen.  Will monitor closely, if continues to be elevated with SBP >140 will discuss starting a low dose anti-hypertensive.  She will be back in the office for annual wellness visit in 1-3 months.        2. Type 1 diabetes mellitus with diabetic chronic kidney disease, unspecified CKD stage (HCC)  Assessment & Plan:    Lab Results   Component Value Date    HGBA1C 8.0 (A) 01/15/2024     Managed by endo      3. Acute pulmonary embolism, unspecified pulmonary embolism type, unspecified whether acute cor pulmonale present ([facility])  Assessment & Plan:  Maintained on Eliquis      4. Acute deep vein thrombosis (DVT) of right lower extremity, unspecified vein ([facility])  Assessment & Plan:  Maintained on Eliquis      5. Cystic fibrosis ([facility])  Assessment & Plan:  Follows closely with [place] transplant team  Currently on a course of doxy for upper respiratory tract infection  Will give Rx for Phenergan-DM for cough      6. Osteopenia, unspecified location  Assessment & Plan:  On chronic steroids 10 mg daily.  Saw demetrio and technically does not meed guidelines for treatment as Z score is >-3.0 but given chronic prednisone use, may consider ibandronate or zolendronate at reduced dose or prolia at follow-up with endo          7. Acute cough  -     promethazine-dextromethorphan (PHENERGAN-DM) 6.25-15 mg/5 mL oral syrup; Take 5 mL by mouth 4 (four) times a day as needed for cough    8. Gastroesophageal reflux disease with esophagitis without hemorrhage  Assessment & Plan:  S/p TIF, EGD 23 showed that fundoplication site appeared normal, no evidence of reflux esophagitis    Over last 3 months  worsening reflux  Continue Pepcid 40 mg qHS and Dexilant 60 mg daily  Repeat EGD planned 4/23 to reassess fundoplication site             Subjective        Review of Systems   Constitutional:  Negative for activity change, chills, fatigue and fever.   Respiratory:  Positive for cough. Negative for shortness of breath and wheezing.    Cardiovascular:  Negative for chest pain, palpitations and leg swelling.   Gastrointestinal:  Negative for diarrhea, nausea and vomiting.   All other systems reviewed and are negative.      Current Outpatient Medications on File Prior to Visit   Medication Sig   • acetaminophen (TYLENOL) 325 mg tablet Take 3 tablets (975 mg total) by mouth every 8 (eight) hours   • albuterol (2.5 mg/3 mL) 0.083 % nebulizer solution Take 6 mL (5 mg total) by nebulization 2 (two) times a day   • albuterol (PROVENTIL HFA,VENTOLIN HFA) 90 mcg/act inhaler INHALE 2 PUFFS EVERY 8 HOURS AS NEEDED FOR SHORTNESS OF BREATH OR WHEEZING.   • ALPRAZolam (XANAX) 1 mg tablet Take 1 tablet (1 mg total) by mouth daily at bedtime as needed for anxiety or sleep   • apixaban (ELIQUIS) 5 mg Take 1 tablet (5 mg total) by mouth 2 (two) times a day   • Ascorbic Acid (vitamin C) 100 MG tablet Take 100 mg by mouth daily   • atovaquone (MEPRON) 750 mg/5 mL suspension Take 1,500 mg by mouth daily   • azithromycin (ZITHROMAX) 250 mg tablet Take 250 mg by mouth 3 (three) times a week   • BD Pen Needle Abi U/F 32G X 4 MM MISC 4 times daily   • bisacodyl (DULCOLAX) 5 mg EC tablet Take 1 tablet (5 mg total) by mouth daily at bedtime   • Cholecalciferol (Vitamin D3) 125 MCG (5000 UT) TABS Take 5,000 Units by mouth daily   • Continuous Blood Gluc  (Dexcom G7 ) SUKHI Use 1 each continuous   • Continuous Blood Gluc Sensor (Dexcom G7 Sensor) Use 1 Device every 10 days   • dexlansoprazole (DEXILANT) 60 MG capsule Take 1 capsule (60 mg total) by mouth daily TAKE 1 CAPSULE BY MOUTH EVERY DAY IN THE MORNING   • doxycycline  hyclate (VIBRA-TABS) 100 mg tablet Take 100 mg by mouth 2 (two) times a day   • dronabinol (MARINOL) 5 MG capsule Take 1 capsule (5 mg total) by mouth 3 (three) times a day   • famotidine (PEPCID) 40 MG tablet Take 1 tablet (40 mg total) by mouth daily at bedtime   • insulin aspart (NovoLOG FlexPen) 100 UNIT/ML injection pen INJECT 3 UNITS UNDER THE SKIN 3 TIMES A DAY WITH MEALS   • Insulin Disposable Pump (Omnipod 5 G6 Intro, Gen 5,) KIT Use 1 Units continuous   • Insulin Disposable Pump (Omnipod 5 G6 Pods, Gen 5,) MISC Use 1 Units every other day   • Insulin Pen Needle (Comfort EZ Pen Needles) 33G X 4 MM MISC Use to inject insulin 4 times a day   • multivitamin (THERAGRAN) TABS Take 1 tablet by mouth daily   • naloxone (NARCAN) 4 mg/0.1 mL nasal spray 4 mg into each nostril   • ondansetron (ZOFRAN) 4 mg tablet Take 4 mg by mouth   • oxybutynin (DITROPAN-XL) 10 MG 24 hr tablet TAKE 1 TABLET BY MOUTH DAILY AT BEDTIME   • oxyCODONE (ROXICODONE) 10 MG TABS Take 1 tablet (10 mg total) by mouth every 8 (eight) hours as needed for severe pain Max Daily Amount: 30 mg   • pancrelipase, Lip-Prot-Amyl, (CREON) 24,000 units Take 48,000 Units by mouth 3 (three) times a day with meals   • polyethylene glycol (MIRALAX) 17 g packet Take 17 g by mouth 2 (two) times a day   • predniSONE 10 mg tablet Take 10 mg by mouth daily   • pregabalin (LYRICA) 150 mg capsule Take 1 capsule (150 mg total) by mouth 2 (two) times a day   • Respiratory Therapy Supplies (Nebulizer) SUKHI Please dispense nebulizer machine   • Semglee, yfgn, 100 UNIT/ML SOPN Inject 0.06 mL (6 Units total) as directed daily with breakfast 12 units hs   • tacrolimus (PROGRAF) 1 mg capsule Take 1 mg by mouth 2 (two) times a day Take 2 tablets and a 0.5 table to total 2.5 mg twice a day 5mg total daily   • Turmeric 500 MG CAPS Take by mouth   • valACYclovir (VALTREX) 500 mg tablet Take 500 mg by mouth daily   • vitamin B-12 (VITAMIN B-12) 1,000 mcg tablet Take by mouth  "daily   • [DISCONTINUED] cyclobenzaprine (FLEXERIL) 5 mg tablet Take 1-2 tabs every 8 hours as needed for muscle pain/spasm (Patient not taking: Reported on 10/13/2022)   • [DISCONTINUED] promethazine-dextromethorphan (PHENERGAN-DM) 6.25-15 mg/5 mL oral syrup Take 5 mL by mouth 4 (four) times a day as needed for cough (Patient not taking: Reported on 4/15/2024)   • [DISCONTINUED] traZODone (DESYREL) 50 mg tablet Take 0.5 tablets (25 mg total) by mouth daily at bedtime       Objective     /88 (BP Location: Left arm, Patient Position: Sitting, Cuff Size: Adult)   Pulse 83   Temp 97.7 °F (36.5 °C) (Tympanic)   Resp 16   Ht 5' 2.5\" (1.588 m)   Wt 52.6 kg (116 lb)   BMI 20.88 kg/m²     Physical Exam  Vitals reviewed.   Constitutional:       General: She is not in acute distress.     Appearance: She is not ill-appearing or toxic-appearing.   Cardiovascular:      Rate and Rhythm: Normal rate and regular rhythm.   Pulmonary:      Effort: No respiratory distress.      Breath sounds: No wheezing.   Abdominal:      General: There is no distension.      Palpations: There is no mass.      Tenderness: There is no abdominal tenderness.   Musculoskeletal:         General: No swelling.   Skin:     General: Skin is warm and dry.   Neurological:      Mental Status: Mental status is at baseline.       Solis Avila MD    " [General Appearance - Well Developed] : well developed [Normal Appearance] : normal appearance [General Appearance - Well Nourished] : well nourished [] : no respiratory distress [Left Infraclavicular] : left infraclavicular area [Clean] : clean [Dry] : dry [Well-Healed] : well-healed [Erythema] : not erythematous [Warm] : not warm [Tender] : not tender [Indurated] : not indurated

## 2024-04-15 NOTE — ASSESSMENT & PLAN NOTE
On chronic steroids 10 mg daily.  Saw demetrio and technically does not meed guidelines for treatment as Z score is >-3.0 but given chronic prednisone use, may consider ibandronate or zolendronate at reduced dose or prolia at follow-up with endo

## 2024-04-15 NOTE — ASSESSMENT & PLAN NOTE
Follows closely with [place] transplant team  Currently on a course of doxy for upper respiratory tract infection  Will give Rx for Phenergan-DM for cough

## 2024-04-15 NOTE — ASSESSMENT & PLAN NOTE
S/p TIF, EGD 5/30/23 showed that fundoplication site appeared normal, no evidence of reflux esophagitis    Over last 3 months worsening reflux  Continue Pepcid 40 mg qHS and Dexilant 60 mg daily  Repeat EGD planned 4/23 to reassess fundoplication site

## 2024-04-16 RX ORDER — OXYBUTYNIN CHLORIDE 10 MG/1
10 TABLET, EXTENDED RELEASE ORAL
Qty: 90 TABLET | Refills: 1 | Status: SHIPPED | OUTPATIENT
Start: 2024-04-16

## 2024-04-17 DIAGNOSIS — E10.22 TYPE 1 DIABETES MELLITUS WITH DIABETIC CHRONIC KIDNEY DISEASE, UNSPECIFIED CKD STAGE (HCC): ICD-10-CM

## 2024-04-18 RX ORDER — INSULIN ASPART 100 [IU]/ML
INJECTION, SOLUTION INTRAVENOUS; SUBCUTANEOUS
Qty: 15 ML | Refills: 1 | Status: SHIPPED | OUTPATIENT
Start: 2024-04-18

## 2024-04-19 DIAGNOSIS — E10.22 TYPE 1 DIABETES MELLITUS WITH DIABETIC CHRONIC KIDNEY DISEASE, UNSPECIFIED CKD STAGE (HCC): ICD-10-CM

## 2024-04-19 RX ORDER — INSULIN GLARGINE-YFGN 100 [IU]/ML
10 INJECTION, SOLUTION SUBCUTANEOUS
Qty: 9 ML | Refills: 1 | Status: SHIPPED | OUTPATIENT
Start: 2024-04-19 | End: 2024-04-22

## 2024-04-22 RX ORDER — INSULIN GLARGINE-YFGN 100 [IU]/ML
10 INJECTION, SOLUTION SUBCUTANEOUS EVERY MORNING
Qty: 15 ML | Refills: 0 | Status: SHIPPED | OUTPATIENT
Start: 2024-04-22

## 2024-04-23 ENCOUNTER — ANESTHESIA (OUTPATIENT)
Dept: GASTROENTEROLOGY | Facility: HOSPITAL | Age: 49
End: 2024-04-23

## 2024-04-23 ENCOUNTER — HOSPITAL ENCOUNTER (OUTPATIENT)
Dept: GASTROENTEROLOGY | Facility: HOSPITAL | Age: 49
Setting detail: OUTPATIENT SURGERY
Discharge: HOME | End: 2024-04-23
Payer: COMMERCIAL

## 2024-04-23 ENCOUNTER — ANESTHESIA EVENT (OUTPATIENT)
Dept: GASTROENTEROLOGY | Facility: HOSPITAL | Age: 49
End: 2024-04-23

## 2024-04-23 VITALS
RESPIRATION RATE: 14 BRPM | DIASTOLIC BLOOD PRESSURE: 66 MMHG | TEMPERATURE: 97.5 F | BODY MASS INDEX: 21.47 KG/M2 | OXYGEN SATURATION: 98 % | HEART RATE: 62 BPM | HEIGHT: 62 IN | WEIGHT: 116.7 LBS | SYSTOLIC BLOOD PRESSURE: 105 MMHG

## 2024-04-23 DIAGNOSIS — K21.9 GASTROESOPHAGEAL REFLUX DISEASE, UNSPECIFIED WHETHER ESOPHAGITIS PRESENT: ICD-10-CM

## 2024-04-23 DIAGNOSIS — R12 HEARTBURN: ICD-10-CM

## 2024-04-23 LAB
EXT PREGNANCY TEST URINE: NEGATIVE
EXT. CONTROL: NORMAL
GLUCOSE SERPL-MCNC: 110 MG/DL (ref 65–140)

## 2024-04-23 PROCEDURE — 82948 REAGENT STRIP/BLOOD GLUCOSE: CPT

## 2024-04-23 PROCEDURE — 88305 TISSUE EXAM BY PATHOLOGIST: CPT | Performed by: PATHOLOGY

## 2024-04-23 PROCEDURE — 81025 URINE PREGNANCY TEST: CPT | Performed by: STUDENT IN AN ORGANIZED HEALTH CARE EDUCATION/TRAINING PROGRAM

## 2024-04-23 PROCEDURE — 43239 EGD BIOPSY SINGLE/MULTIPLE: CPT | Performed by: INTERNAL MEDICINE

## 2024-04-23 PROCEDURE — 88312 SPECIAL STAINS GROUP 1: CPT | Performed by: PATHOLOGY

## 2024-04-23 RX ORDER — PHENYLEPHRINE HCL IN 0.9% NACL 1 MG/10 ML
SYRINGE (ML) INTRAVENOUS AS NEEDED
Status: DISCONTINUED | OUTPATIENT
Start: 2024-04-23 | End: 2024-04-23

## 2024-04-23 RX ORDER — SODIUM CHLORIDE, SODIUM LACTATE, POTASSIUM CHLORIDE, CALCIUM CHLORIDE 600; 310; 30; 20 MG/100ML; MG/100ML; MG/100ML; MG/100ML
INJECTION, SOLUTION INTRAVENOUS CONTINUOUS PRN
Status: DISCONTINUED | OUTPATIENT
Start: 2024-04-23 | End: 2024-04-23

## 2024-04-23 RX ORDER — PROPOFOL 10 MG/ML
INJECTION, EMULSION INTRAVENOUS AS NEEDED
Status: DISCONTINUED | OUTPATIENT
Start: 2024-04-23 | End: 2024-04-23

## 2024-04-23 RX ORDER — LIDOCAINE HYDROCHLORIDE 20 MG/ML
INJECTION, SOLUTION EPIDURAL; INFILTRATION; INTRACAUDAL; PERINEURAL AS NEEDED
Status: DISCONTINUED | OUTPATIENT
Start: 2024-04-23 | End: 2024-04-23

## 2024-04-23 RX ADMIN — PROPOFOL 25 MG: 10 INJECTION, EMULSION INTRAVENOUS at 07:40

## 2024-04-23 RX ADMIN — PROPOFOL 50 MG: 10 INJECTION, EMULSION INTRAVENOUS at 07:39

## 2024-04-23 RX ADMIN — PROPOFOL 50 MG: 10 INJECTION, EMULSION INTRAVENOUS at 07:43

## 2024-04-23 RX ADMIN — Medication 100 MCG: at 07:41

## 2024-04-23 RX ADMIN — PROPOFOL 50 MG: 10 INJECTION, EMULSION INTRAVENOUS at 07:38

## 2024-04-23 RX ADMIN — SODIUM CHLORIDE, SODIUM LACTATE, POTASSIUM CHLORIDE, AND CALCIUM CHLORIDE: .6; .31; .03; .02 INJECTION, SOLUTION INTRAVENOUS at 07:03

## 2024-04-23 RX ADMIN — PROPOFOL 25 MG: 10 INJECTION, EMULSION INTRAVENOUS at 07:44

## 2024-04-23 RX ADMIN — Medication 100 MCG: at 07:47

## 2024-04-23 RX ADMIN — PROPOFOL 50 MG: 10 INJECTION, EMULSION INTRAVENOUS at 07:41

## 2024-04-23 RX ADMIN — LIDOCAINE HYDROCHLORIDE 100 MG: 20 INJECTION, SOLUTION EPIDURAL; INFILTRATION; INTRACAUDAL; PERINEURAL at 07:38

## 2024-04-23 NOTE — ANESTHESIA POSTPROCEDURE EVALUATION
Post-Op Assessment Note    CV Status:  Stable    Pain management: adequate       Mental Status:  Sleepy   Hydration Status:  Euvolemic   PONV Controlled:  Controlled   Airway Patency:  Patent     Post Op Vitals Reviewed: Yes    No anethesia notable event occurred.    Staff: CRNA               /58 (04/23/24 0751)    Temp      Pulse 62 (04/23/24 0751)   Resp 12 (04/23/24 0751)    SpO2 100 % (04/23/24 0751)

## 2024-04-23 NOTE — ANESTHESIA PREPROCEDURE EVALUATION
Procedure:  EGD    Relevant Problems   CARDIO   (+) DVT (deep venous thrombosis) (HCC)   (+) Essential hypertension   (+) Hypercholesteremia   (+) Rib pain      ENDO   (+) Type 1 diabetes mellitus with diabetic chronic kidney disease, unspecified CKD stage (HCC)      GI/HEPATIC   (+) Chronic pancreatitis, unspecified pancreatitis type (HCC)   (+) Esophageal reflux   (+) Hiatal hernia   (+) Pancreatic insufficiency      /RENAL   (+) Stage 3a chronic kidney disease (HCC)      HEMATOLOGY   (+) Acute on chronic anemia      MUSCULOSKELETAL   (+) Hiatal hernia      NEURO/PSYCH   (+) Anxiety   (+) Chronic pain disorder   (+) Continuous opioid dependence (HCC)   (+) Weakness of right lower extremity      PULMONARY   (+) Asthma       Latest Reference Range & Units 06/29/23 07:06   PREGNANCY TEST URINE Negative  Negative     Normal sinus rhythm  ST elevation, consider early repolarization, pericarditis, or injury  Confirmed by Elba Gutierrez ([id number]) on 5/7/2023 1:01:37 PM    ECHO 11/9/22:  Normal LV systolic function, LVEF 55%, mild RV systolic function. No AI/AS, trace MR, trace TR, trace PI    Physical Exam    Airway    Mallampati score: I  TM Distance: >3 FB  Neck ROM: full     Dental   No notable dental hx     Cardiovascular      Pulmonary      Other Findings  post-pubertal.      Anesthesia Plan  ASA Score- 3     Anesthesia Type- IV sedation with anesthesia with ASA Monitors.         Additional Monitors:     Airway Plan:            Plan Factors-Exercise tolerance (METS): >4 METS.    Chart reviewed.   Existing labs reviewed. Patient summary reviewed.    Patient is not a current smoker.  Patient did not smoke on day of surgery.            Induction- intravenous.    Postoperative Plan-     Informed Consent- Anesthetic plan and risks discussed with patient.  I personally reviewed this patient with the CRNA. Discussed and agreed on the Anesthesia Plan with the CRNA..

## 2024-04-26 DIAGNOSIS — K21.9 GASTROESOPHAGEAL REFLUX DISEASE, UNSPECIFIED WHETHER ESOPHAGITIS PRESENT: Primary | ICD-10-CM

## 2024-04-26 DIAGNOSIS — B37.9 CANDIDA INFECTION: ICD-10-CM

## 2024-04-26 NOTE — RESULT ENCOUNTER NOTE
Please call the patient regarding her abnormal result.  Shows mild inflammation.  Also rare fungus/thrush.  Take nystatin swish and swallow as prescribed for 5 days, I sent the prescription to her home*pharmacy.  Follow up in my office as needed

## 2024-04-29 DIAGNOSIS — K21.9 GASTROESOPHAGEAL REFLUX DISEASE, UNSPECIFIED WHETHER ESOPHAGITIS PRESENT: Primary | ICD-10-CM

## 2024-04-29 NOTE — RESULT ENCOUNTER NOTE
Please call the patient regarding her abnormal result.  Mild inflammation, continue same.  Follow up in my office as needed

## 2024-05-20 ENCOUNTER — PATIENT MESSAGE (OUTPATIENT)
Dept: INTERNAL MEDICINE CLINIC | Facility: CLINIC | Age: 49
End: 2024-05-20

## 2024-05-20 DIAGNOSIS — E10.22 TYPE 1 DIABETES MELLITUS WITH DIABETIC CHRONIC KIDNEY DISEASE, UNSPECIFIED CKD STAGE (HCC): ICD-10-CM

## 2024-05-20 RX ORDER — ACYCLOVIR 400 MG/1
1 TABLET ORAL
Qty: 3 EACH | Refills: 5 | Status: SHIPPED | OUTPATIENT
Start: 2024-05-20

## 2024-05-20 NOTE — ASSESSMENT & PLAN NOTE
CHF Week 1 Survey      Flowsheet Row Responses   [facility] patient discharged from? [facility]   Does the patient have one of the following disease processes/diagnoses(primary or secondary)? CHF   CHF Week 1 attempt successful? Yes   Call start time 1533   Call end time 1601   Discharge diagnosis A/C CHF   Meds reviewed with patient/caregiver? Yes   Is the patient having any side effects they believe may be caused by any medication additions or changes? Yes   Side effects comments  Pt reports meds make him feel a little sick   Does the patient have all medications ordered at discharge? Yes   Is the patient taking all medications as directed (includes completed medication regime)? Yes   Comments regarding appointments heart failure clinic appt 5/28/24, Cards appt on 5/31/24   Does the patient have a primary care provider?  Yes   Does the patient have an appointment with their PCP within 7 days of discharge? No   Nursing Interventions Advised patient to make appointment   Has the patient kept scheduled appointments due by today? N/A   Has home health visited the patient within 72 hours of discharge? N/A   DME comments Pt is wearing a lifevest   Pulse Ox monitoring Intermittent   Pulse Ox device source Patient   O2 Sat comments sats 98% on RA   O2 Sat: education provided Sat levels, Monitoring frequency, When to seek care   Psychosocial issues? No   Comments Pt's HR was reading 49 on pulse ox, BP machine is not working to verify HR, second attempt on pulse ox HR 79. Pt reports he is wearing a lifevest. Enc pt to monitor his O2 sats, HR and BP and f/u with Dr's or go to ED if HR continues to stay low. Pt VU   Did the patient receive a copy of their discharge instructions? Yes   What is the patient's perception of their health status since discharge? Same  [sob, weak, occasional dizziness]   Nursing interventions Nurse provided patient education   Is the patient/caregiver able to teach back the hierarchy of who to  · History of PE, DVT in the past   · Continue Eliquis 5 mg twice daily  call/visit for symptoms/problems? PCP, Specialist, Home health nurse, Urgent Care, ED, 911 Yes   CHF Zone this Call Yellow Zone   Yellow Zone Not feeling as good as usual   Yellow Zone Interventions Consider contacting your doctor or health care team    CHF Week 1 call completed? Yes   Is the patient interested in additional calls from an ambulatory ? No   Would this patient benefit from a Referral to [facility] Social Work? No   Call end time 1601            Sveta GIBBS - Registered Nurse

## 2024-05-21 ENCOUNTER — OFFICE VISIT (OUTPATIENT)
Dept: UROLOGY | Facility: CLINIC | Age: 49
End: 2024-05-21
Payer: COMMERCIAL

## 2024-05-21 ENCOUNTER — APPOINTMENT (EMERGENCY)
Dept: RADIOLOGY | Facility: HOSPITAL | Age: 49
End: 2024-05-21
Payer: COMMERCIAL

## 2024-05-21 ENCOUNTER — HOSPITAL ENCOUNTER (EMERGENCY)
Facility: HOSPITAL | Age: 49
Discharge: HOME | End: 2024-05-21
Attending: EMERGENCY MEDICINE
Payer: COMMERCIAL

## 2024-05-21 VITALS
OXYGEN SATURATION: 97 % | RESPIRATION RATE: 18 BRPM | WEIGHT: 128.53 LBS | DIASTOLIC BLOOD PRESSURE: 78 MMHG | SYSTOLIC BLOOD PRESSURE: 118 MMHG | BODY MASS INDEX: 23.51 KG/M2 | TEMPERATURE: 98 F | HEART RATE: 59 BPM

## 2024-05-21 VITALS
WEIGHT: 120 LBS | DIASTOLIC BLOOD PRESSURE: 78 MMHG | HEART RATE: 74 BPM | BODY MASS INDEX: 22.08 KG/M2 | SYSTOLIC BLOOD PRESSURE: 128 MMHG | OXYGEN SATURATION: 98 % | HEIGHT: 62 IN

## 2024-05-21 DIAGNOSIS — R35.0 URINE FREQUENCY: Primary | ICD-10-CM

## 2024-05-21 DIAGNOSIS — R05.1 ACUTE COUGH: ICD-10-CM

## 2024-05-21 DIAGNOSIS — E16.2 HYPOGLYCEMIA: Primary | ICD-10-CM

## 2024-05-21 LAB
ALBUMIN SERPL BCP-MCNC: 3.6 G/DL (ref 3.5–5)
ALP SERPL-CCNC: 71 U/L (ref 34–104)
ALT SERPL W P-5'-P-CCNC: 11 U/L (ref 7–52)
ANION GAP SERPL CALCULATED.3IONS-SCNC: 5 MMOL/L (ref 4–13)
AST SERPL W P-5'-P-CCNC: 12 U/L (ref 13–39)
B-OH-BUTYR SERPL-MCNC: 0.06 MMOL/L (ref 0.02–0.27)
BASE EX.OXY STD BLDV CALC-SCNC: 70 % (ref 60–80)
BASE EXCESS BLDV CALC-SCNC: -1.4 MMOL/L
BASOPHILS # BLD AUTO: 0.02 THOUSANDS/ÂΜL (ref 0–0.1)
BASOPHILS NFR BLD AUTO: 0 % (ref 0–1)
BILIRUB SERPL-MCNC: 0.39 MG/DL (ref 0.2–1)
BILIRUB UR QL STRIP: NEGATIVE
BUN SERPL-MCNC: 24 MG/DL (ref 5–25)
CALCIUM SERPL-MCNC: 8.5 MG/DL (ref 8.4–10.2)
CHLORIDE SERPL-SCNC: 106 MMOL/L (ref 96–108)
CLARITY UR: CLEAR
CO2 SERPL-SCNC: 27 MMOL/L (ref 21–32)
COLOR UR: NORMAL
CREAT SERPL-MCNC: 0.88 MG/DL (ref 0.6–1.3)
EOSINOPHIL # BLD AUTO: 0.19 THOUSAND/ÂΜL (ref 0–0.61)
EOSINOPHIL NFR BLD AUTO: 3 % (ref 0–6)
ERYTHROCYTE [DISTWIDTH] IN BLOOD BY AUTOMATED COUNT: 13.3 % (ref 11.6–15.1)
GFR SERPL CREATININE-BSD FRML MDRD: 79 ML/MIN/1.73SQ M
GLUCOSE SERPL-MCNC: 103 MG/DL (ref 65–140)
GLUCOSE SERPL-MCNC: 68 MG/DL (ref 65–140)
GLUCOSE SERPL-MCNC: 74 MG/DL (ref 65–140)
GLUCOSE UR STRIP-MCNC: NEGATIVE MG/DL
HCO3 BLDV-SCNC: 24.2 MMOL/L (ref 24–30)
HCT VFR BLD AUTO: 36 % (ref 34.8–46.1)
HGB BLD-MCNC: 11.4 G/DL (ref 11.5–15.4)
HGB UR QL STRIP.AUTO: NEGATIVE
IMM GRANULOCYTES # BLD AUTO: 0.04 THOUSAND/UL (ref 0–0.2)
IMM GRANULOCYTES NFR BLD AUTO: 1 % (ref 0–2)
KETONES UR STRIP-MCNC: NEGATIVE MG/DL
LEUKOCYTE ESTERASE UR QL STRIP: NEGATIVE
LYMPHOCYTES # BLD AUTO: 1.66 THOUSANDS/ÂΜL (ref 0.6–4.47)
LYMPHOCYTES NFR BLD AUTO: 23 % (ref 14–44)
MCH RBC QN AUTO: 29.5 PG (ref 26.8–34.3)
MCHC RBC AUTO-ENTMCNC: 31.7 G/DL (ref 31.4–37.4)
MCV RBC AUTO: 93 FL (ref 82–98)
MONOCYTES # BLD AUTO: 0.86 THOUSAND/ÂΜL (ref 0.17–1.22)
MONOCYTES NFR BLD AUTO: 12 % (ref 4–12)
NEUTROPHILS # BLD AUTO: 4.53 THOUSANDS/ÂΜL (ref 1.85–7.62)
NEUTS SEG NFR BLD AUTO: 61 % (ref 43–75)
NITRITE UR QL STRIP: NEGATIVE
NRBC BLD AUTO-RTO: 0 /100 WBCS
O2 CT BLDV-SCNC: 12.1 ML/DL
PCO2 BLDV: 44.1 MM HG (ref 42–50)
PH BLDV: 7.36 [PH] (ref 7.3–7.4)
PH UR STRIP.AUTO: 5 [PH]
PLATELET # BLD AUTO: 147 THOUSANDS/UL (ref 149–390)
PMV BLD AUTO: 10 FL (ref 8.9–12.7)
PO2 BLDV: 37.7 MM HG (ref 35–45)
POST-VOID RESIDUAL VOLUME, ML POC: 6 ML
POTASSIUM SERPL-SCNC: 3.6 MMOL/L (ref 3.5–5.3)
PROT SERPL-MCNC: 7 G/DL (ref 6.4–8.4)
PROT UR STRIP-MCNC: NEGATIVE MG/DL
RBC # BLD AUTO: 3.87 MILLION/UL (ref 3.81–5.12)
SODIUM SERPL-SCNC: 138 MMOL/L (ref 135–147)
SP GR UR STRIP.AUTO: 1.01 (ref 1–1.03)
UROBILINOGEN UR STRIP-ACNC: <2 MG/DL
WBC # BLD AUTO: 7.3 THOUSAND/UL (ref 4.31–10.16)

## 2024-05-21 PROCEDURE — 82948 REAGENT STRIP/BLOOD GLUCOSE: CPT

## 2024-05-21 PROCEDURE — 36415 COLL VENOUS BLD VENIPUNCTURE: CPT | Performed by: EMERGENCY MEDICINE

## 2024-05-21 PROCEDURE — 51798 US URINE CAPACITY MEASURE: CPT | Performed by: PHYSICIAN ASSISTANT

## 2024-05-21 PROCEDURE — 82010 KETONE BODYS QUAN: CPT | Performed by: EMERGENCY MEDICINE

## 2024-05-21 PROCEDURE — 80053 COMPREHEN METABOLIC PANEL: CPT | Performed by: EMERGENCY MEDICINE

## 2024-05-21 PROCEDURE — 99284 EMERGENCY DEPT VISIT MOD MDM: CPT

## 2024-05-21 PROCEDURE — 81003 URINALYSIS AUTO W/O SCOPE: CPT | Performed by: EMERGENCY MEDICINE

## 2024-05-21 PROCEDURE — 82805 BLOOD GASES W/O2 SATURATION: CPT | Performed by: EMERGENCY MEDICINE

## 2024-05-21 PROCEDURE — 85025 COMPLETE CBC W/AUTO DIFF WBC: CPT | Performed by: EMERGENCY MEDICINE

## 2024-05-21 PROCEDURE — 99284 EMERGENCY DEPT VISIT MOD MDM: CPT | Performed by: EMERGENCY MEDICINE

## 2024-05-21 PROCEDURE — 71045 X-RAY EXAM CHEST 1 VIEW: CPT

## 2024-05-21 PROCEDURE — 99213 OFFICE O/P EST LOW 20 MIN: CPT | Performed by: PHYSICIAN ASSISTANT

## 2024-05-21 RX ORDER — DEXTROMETHORPHAN HYDROBROMIDE AND PROMETHAZINE HYDROCHLORIDE 15; 6.25 MG/5ML; MG/5ML
5 SYRUP ORAL 4 TIMES DAILY PRN
Qty: 180 ML | Refills: 0 | Status: SHIPPED | OUTPATIENT
Start: 2024-05-21

## 2024-05-21 RX ORDER — OXYBUTYNIN CHLORIDE 10 MG/1
10 TABLET, EXTENDED RELEASE ORAL
Qty: 90 TABLET | Refills: 3 | Status: SHIPPED | OUTPATIENT
Start: 2024-05-21

## 2024-05-21 NOTE — PROGRESS NOTES
"5/21/2024      Chief Complaint   Patient presents with   • Follow-up         Assessment and Plan    48 y.o. female     1. Urinary frequency  2. Urinary urgency  3. Urge urinary incontinence  - PVR today 6 mL  - Continue Oxybutynin  - Follow up as needed. PCP to refill prescription   - Call with any questions or concerns in the meantime  - All questions answered; patient understands and agrees with plan       History of Present Illness  Maximilian Mann is a 48 y.o. female patient with history of nephrolithiasis and OAB here for follow up.     Patient was started on oxybutynin for OAB symptoms. Has been doing well with this medication. Denies new or worsening symptoms today. Denies passage of kidney stones recently.     Medical history includes type 1 diabetes, cystic fibrosis status post lung transplant, hypertension, ulcerative pancolitis, history of blood clots      Review of Systems   Constitutional:  Negative for activity change, appetite change, chills and fever.   HENT:  Negative for congestion and trouble swallowing.    Respiratory:  Negative for cough and shortness of breath.    Cardiovascular:  Negative for chest pain, palpitations and leg swelling.   Gastrointestinal:  Negative for abdominal pain, constipation, diarrhea, nausea and vomiting.   Genitourinary:  Negative for difficulty urinating, dysuria, flank pain, frequency, hematuria and urgency.   Musculoskeletal:  Negative for back pain and gait problem.   Skin:  Negative for wound.   Allergic/Immunologic: Negative for immunocompromised state.   Neurological:  Negative for dizziness and syncope.   Hematological:  Does not bruise/bleed easily.   Psychiatric/Behavioral:  Negative for confusion.    All other systems reviewed and are negative.      Vitals  Vitals:    05/21/24 1512   BP: 128/78   Pulse: 74   SpO2: 98%   Weight: 54.4 kg (120 lb)   Height: 5' 2\" (1.575 m)       Physical Exam  Constitutional:       General: She is not in acute distress.   "   Appearance: Normal appearance. She is not ill-appearing, toxic-appearing or diaphoretic.   HENT:      Head: Normocephalic.      Nose: No congestion.   Eyes:      General: No scleral icterus.        Right eye: No discharge.         Left eye: No discharge.      Conjunctiva/sclera: Conjunctivae normal.      Pupils: Pupils are equal, round, and reactive to light.   Pulmonary:      Effort: Pulmonary effort is normal.   Musculoskeletal:      Cervical back: Normal range of motion.   Skin:     General: Skin is warm and dry.      Coloration: Skin is not jaundiced or pale.      Findings: No bruising, erythema, lesion or rash.   Neurological:      General: No focal deficit present.      Mental Status: She is alert and oriented to person, place, and time. Mental status is at baseline.      Gait: Gait normal.   Psychiatric:         Mood and Affect: Mood normal.         Behavior: Behavior normal.         Thought Content: Thought content normal.         Judgment: Judgment normal.         Past History  Past Medical History:   Diagnosis Date   • Abnormal uterine bleeding (AUB)    • ABPA (allergic bronchopulmonary aspergillosis) ([facility]) 07/08/2012   • Acute kidney injury (HCC) 09/08/2017   • Acute on chronic respiratory failure ([facility]) 03/04/2017    Last Assessment & Plan:  Formatting of this note might be different from the original. Recurrent with clinical worsening with enlarging PTX, s/p multiple chest tubes, but new fevers 6/19 and worsening leukocytosis indicating infection as cause of 6/19 decompensation with profound hypoxia in setting of shunting through right lung and continued pneumothorax s/p 2nd chest tube placement.  6/27: Right   • Anaphylaxis 06/06/2022   • Anemia    • Asthma    • Chest pain 09/21/2021   • Chronic pain    • Chronic respiratory failure with hypoxia ([facility]) 09/07/2021   • COVID-19 06/03/2022   • Cystic fibrosis ([facility])    • Deep vein thrombosis (HCC)    • Diabetes mellitus ([facility])    • GERD (gastroesophageal  reflux disease)    • HBP (high blood pressure)    • HCAP (healthcare-associated pneumonia) 2020   • History of transfusion    • Influenza B 2020   • Kidney stone    • Neutropenic fever  (HCC) 2020   • Pneumonia due to Pseudomonas species (HCC) 10/28/2010    Last Assessment & Plan:  Formatting of this note might be different from the original. Polymicrobial pna w/ pseudomonas, M. Abscessus, Cadida glabrata/albicans   • PONV (postoperative nausea and vomiting)    • Pseudomonas aeruginosa infection 2012   • Renal calculus    • Squamous cell skin cancer 2023    SCCIS- Left upper thigh   • Transplant recipient     Lung- 2017   • Ulcerative colitis (HCC)    • Wellness examination 2013    Formatting of this note might be different from the original. Discussed with patient and acknowledgement form signed on 2013 (ALYSHA)     Social History     Socioeconomic History   • Marital status: /Civil Union     Spouse name: None   • Number of children: None   • Years of education: 12   • Highest education level: None   Occupational History   • Occupation: Disabled    Tobacco Use   • Smoking status: Former     Current packs/day: 0.00     Average packs/day: 0.5 packs/day for 12.0 years (6.0 ttl pk-yrs)     Types: Cigarettes     Start date: 1993     Quit date: 2005     Years since quittin.3     Passive exposure: Past   • Smokeless tobacco: Never   Vaping Use   • Vaping status: Never Used   Substance and Sexual Activity   • Alcohol use: Yes     Alcohol/week: 2.0 standard drinks of alcohol     Types: 2 Glasses of wine per week     Comment: social   • Drug use: Yes     Frequency: 2.0 times per week     Types: Marijuana     Comment: medical edible marijuana prescribed   • Sexual activity: Yes     Partners: Male     Birth control/protection: None   Other Topics Concern   • None   Social History Narrative    Most recent tobacco use screenin2019    Do you currently or have  you served in the [employer]: No    Were you activated, into active duty, as a member of the [employer] or as a Reservist: No    Occupation: disabled    Education: 12    Marital status:     Exercise level: Occasional    Diet: Specific    low sugar, high fat    General stress level: High    Alcohol intake: Occasional    Caffeine intake: Heavy    Chewing tobacco: none    Illicit drugs: none    Guns present in home: No    Seat belts used routinely: Yes    Smoke alarm in home: Yes    Advance directive: Yes     Social Determinants of Health     Financial Resource Strain: Low Risk  (11/10/2022)    [facility]    Overall Financial Resource Strain (CARDIA)    • Difficulty of Paying Living Expenses: Not hard at all   Food Insecurity: No Food Insecurity (5/5/2023)    Hunger Vital Sign    • Worried About Running Out of Food in the Last Year: Never true    • Ran Out of Food in the Last Year: Never true   Transportation Needs: No Transportation Needs (5/5/2023)    PRAPARE - Transportation    • Lack of Transportation (Medical): No    • Lack of Transportation (Non-Medical): No   Physical Activity: Insufficiently Active (1/15/2024)    Exercise Vital Sign    • Days of Exercise per Week: 7 days    • Minutes of Exercise per Session: 20 min   Stress: No Stress Concern Present (1/15/2024)    Citizen of [place] of Occupational Health - Occupational Stress Questionnaire    • Feeling of Stress : Only a little   Social Connections: Moderately Isolated (1/15/2024)    Social Connection and Isolation Panel [NHANES]    • Frequency of Communication with Friends and Family: More than three times a week    • Frequency of Social Gatherings with Friends and Family: More than three times a week    • Attends Christianity Services: Never    • Active Member of Clubs or Organizations: No    • Attends Club or Organization Meetings: Never    • Marital Status:  "   Intimate Partner Violence: Not At Risk (1/15/2024)    Humiliation, Afraid, Rape, and Kick questionnaire    • Fear of Current or Ex-Partner: No    • Emotionally Abused: No    • Physically Abused: No    • Sexually Abused: No   Housing Stability: Low Risk  (2023)    Housing Stability Vital Sign    • Unable to Pay for Housing in the Last Year: No    • Number of Places Lived in the Last Year: 1    • Unstable Housing in the Last Year: No     Social History     Tobacco Use   Smoking Status Former   • Current packs/day: 0.00   • Average packs/day: 0.5 packs/day for 12.0 years (6.0 ttl pk-yrs)   • Types: Cigarettes   • Start date: 1993   • Quit date: 2005   • Years since quittin.3   • Passive exposure: Past   Smokeless Tobacco Never     Family History   Problem Relation Age of Onset   • Alcohol abuse Mother    • Mental illness Mother    • Depression Mother    • Alcohol abuse Father    • COPD Maternal Grandmother    • Lung cancer Maternal Grandmother 65   • Cancer Maternal Grandmother    • COPD Maternal Grandfather    • Cancer Maternal Grandfather    • COPD Paternal Grandmother    • Lung cancer Paternal Grandmother 65   • No Known Problems Sister    • No Known Problems Paternal Aunt    • Breast cancer Neg Hx    • Ovarian cancer Neg Hx    • Colon cancer Neg Hx        The following portions of the patient's history were reviewed and updated as appropriate: allergies, current medications, past medical history, past social history, past surgical history and problem list.    Results  Recent Results (from the past 1 hour(s))   POCT Measure PVR    Collection Time: 24  3:13 PM   Result Value Ref Range    POST-VOID RESIDUAL VOLUME, ML POC 6 mL   ]  No results found for: \"PSA\"  Lab Results   Component Value Date    CALCIUM 9.1 2024    K 4.7 2024    CO2 28 2024     2024    BUN 31 (H) 2024    CREATININE 1.19 (H) 2024     Lab Results   Component Value Date    WBC " 8.95 08/30/2023    HGB 9.3 (L) 08/30/2023    HCT 32.1 (L) 08/30/2023    MCV 76 (L) 08/30/2023     08/30/2023       Michelle Gayle PA-C

## 2024-05-26 ENCOUNTER — APPOINTMENT (EMERGENCY)
Dept: RADIOLOGY | Facility: HOSPITAL | Age: 49
End: 2024-05-26
Payer: MEDICARE

## 2024-05-26 ENCOUNTER — HOSPITAL ENCOUNTER (EMERGENCY)
Facility: HOSPITAL | Age: 49
Discharge: ANOTHER HOSPITAL | End: 2024-05-26
Attending: EMERGENCY MEDICINE
Payer: MEDICARE

## 2024-05-26 VITALS
TEMPERATURE: 100.2 F | OXYGEN SATURATION: 95 % | HEART RATE: 75 BPM | BODY MASS INDEX: 21.58 KG/M2 | RESPIRATION RATE: 20 BRPM | SYSTOLIC BLOOD PRESSURE: 124 MMHG | DIASTOLIC BLOOD PRESSURE: 56 MMHG | WEIGHT: 118 LBS

## 2024-05-26 DIAGNOSIS — J18.9 PNEUMONIA: Primary | ICD-10-CM

## 2024-05-26 DIAGNOSIS — Z94.2 LUNG TRANSPLANTED (HCC): ICD-10-CM

## 2024-05-26 LAB
2HR DELTA HS TROPONIN: -5 NG/L
ALBUMIN SERPL BCP-MCNC: 3.8 G/DL (ref 3.5–5)
ALP SERPL-CCNC: 92 U/L (ref 34–104)
ALT SERPL W P-5'-P-CCNC: 13 U/L (ref 7–52)
ANION GAP SERPL CALCULATED.3IONS-SCNC: 8 MMOL/L (ref 4–13)
AST SERPL W P-5'-P-CCNC: 14 U/L (ref 13–39)
ATRIAL RATE: 82 BPM
BASOPHILS # BLD AUTO: 0.03 THOUSANDS/ÂΜL (ref 0–0.1)
BASOPHILS NFR BLD AUTO: 0 % (ref 0–1)
BILIRUB SERPL-MCNC: 0.75 MG/DL (ref 0.2–1)
BUN SERPL-MCNC: 24 MG/DL (ref 5–25)
CALCIUM SERPL-MCNC: 8.9 MG/DL (ref 8.4–10.2)
CARDIAC TROPONIN I PNL SERPL HS: 10 NG/L
CARDIAC TROPONIN I PNL SERPL HS: 15 NG/L
CHLORIDE SERPL-SCNC: 103 MMOL/L (ref 96–108)
CO2 SERPL-SCNC: 26 MMOL/L (ref 21–32)
CREAT SERPL-MCNC: 1.43 MG/DL (ref 0.6–1.3)
EOSINOPHIL # BLD AUTO: 0.21 THOUSAND/ÂΜL (ref 0–0.61)
EOSINOPHIL NFR BLD AUTO: 1 % (ref 0–6)
ERYTHROCYTE [DISTWIDTH] IN BLOOD BY AUTOMATED COUNT: 13.5 % (ref 11.6–15.1)
FLUAV RNA RESP QL NAA+PROBE: NEGATIVE
FLUBV RNA RESP QL NAA+PROBE: NEGATIVE
GFR SERPL CREATININE-BSD FRML MDRD: 43 ML/MIN/1.73SQ M
GLUCOSE SERPL-MCNC: 169 MG/DL (ref 65–140)
GLUCOSE SERPL-MCNC: 176 MG/DL (ref 65–140)
GLUCOSE SERPL-MCNC: 222 MG/DL (ref 65–140)
HCT VFR BLD AUTO: 40.5 % (ref 34.8–46.1)
HGB BLD-MCNC: 12.8 G/DL (ref 11.5–15.4)
IMM GRANULOCYTES # BLD AUTO: 0.11 THOUSAND/UL (ref 0–0.2)
IMM GRANULOCYTES NFR BLD AUTO: 1 % (ref 0–2)
LACTATE SERPL-SCNC: 1.9 MMOL/L (ref 0.5–2)
LYMPHOCYTES # BLD AUTO: 1.88 THOUSANDS/ÂΜL (ref 0.6–4.47)
LYMPHOCYTES NFR BLD AUTO: 11 % (ref 14–44)
MCH RBC QN AUTO: 29.6 PG (ref 26.8–34.3)
MCHC RBC AUTO-ENTMCNC: 31.6 G/DL (ref 31.4–37.4)
MCV RBC AUTO: 94 FL (ref 82–98)
MONOCYTES # BLD AUTO: 1.05 THOUSAND/ÂΜL (ref 0.17–1.22)
MONOCYTES NFR BLD AUTO: 6 % (ref 4–12)
NEUTROPHILS # BLD AUTO: 13.45 THOUSANDS/ÂΜL (ref 1.85–7.62)
NEUTS SEG NFR BLD AUTO: 81 % (ref 43–75)
NRBC BLD AUTO-RTO: 0 /100 WBCS
P AXIS: 65 DEGREES
PLATELET # BLD AUTO: 205 THOUSANDS/UL (ref 149–390)
PMV BLD AUTO: 10.8 FL (ref 8.9–12.7)
POTASSIUM SERPL-SCNC: 4.6 MMOL/L (ref 3.5–5.3)
PR INTERVAL: 142 MS
PROT SERPL-MCNC: 7.8 G/DL (ref 6.4–8.4)
QRS AXIS: 61 DEGREES
QRSD INTERVAL: 76 MS
QT INTERVAL: 376 MS
QTC INTERVAL: 439 MS
RBC # BLD AUTO: 4.32 MILLION/UL (ref 3.81–5.12)
RSV RNA RESP QL NAA+PROBE: NEGATIVE
SARS-COV-2 RNA RESP QL NAA+PROBE: NEGATIVE
SODIUM SERPL-SCNC: 137 MMOL/L (ref 135–147)
T WAVE AXIS: 66 DEGREES
VENTRICULAR RATE: 82 BPM
WBC # BLD AUTO: 16.73 THOUSAND/UL (ref 4.31–10.16)

## 2024-05-26 PROCEDURE — 0241U HB NFCT DS VIR RESP RNA 4 TRGT: CPT | Performed by: PHYSICIAN ASSISTANT

## 2024-05-26 PROCEDURE — 83605 ASSAY OF LACTIC ACID: CPT | Performed by: PHYSICIAN ASSISTANT

## 2024-05-26 PROCEDURE — 96375 TX/PRO/DX INJ NEW DRUG ADDON: CPT

## 2024-05-26 PROCEDURE — 96376 TX/PRO/DX INJ SAME DRUG ADON: CPT

## 2024-05-26 PROCEDURE — 99285 EMERGENCY DEPT VISIT HI MDM: CPT | Performed by: PHYSICIAN ASSISTANT

## 2024-05-26 PROCEDURE — 99284 EMERGENCY DEPT VISIT MOD MDM: CPT

## 2024-05-26 PROCEDURE — 96367 TX/PROPH/DG ADDL SEQ IV INF: CPT

## 2024-05-26 PROCEDURE — 93005 ELECTROCARDIOGRAM TRACING: CPT

## 2024-05-26 PROCEDURE — 96361 HYDRATE IV INFUSION ADD-ON: CPT

## 2024-05-26 PROCEDURE — 93010 ELECTROCARDIOGRAM REPORT: CPT | Performed by: INTERNAL MEDICINE

## 2024-05-26 PROCEDURE — 87040 BLOOD CULTURE FOR BACTERIA: CPT | Performed by: PHYSICIAN ASSISTANT

## 2024-05-26 PROCEDURE — 80053 COMPREHEN METABOLIC PANEL: CPT | Performed by: PHYSICIAN ASSISTANT

## 2024-05-26 PROCEDURE — 96372 THER/PROPH/DIAG INJ SC/IM: CPT

## 2024-05-26 PROCEDURE — 96365 THER/PROPH/DIAG IV INF INIT: CPT

## 2024-05-26 PROCEDURE — 82948 REAGENT STRIP/BLOOD GLUCOSE: CPT

## 2024-05-26 PROCEDURE — 84484 ASSAY OF TROPONIN QUANT: CPT | Performed by: PHYSICIAN ASSISTANT

## 2024-05-26 PROCEDURE — 94640 AIRWAY INHALATION TREATMENT: CPT

## 2024-05-26 PROCEDURE — 71046 X-RAY EXAM CHEST 2 VIEWS: CPT

## 2024-05-26 PROCEDURE — 36415 COLL VENOUS BLD VENIPUNCTURE: CPT | Performed by: PHYSICIAN ASSISTANT

## 2024-05-26 PROCEDURE — 85025 COMPLETE CBC W/AUTO DIFF WBC: CPT | Performed by: PHYSICIAN ASSISTANT

## 2024-05-26 RX ORDER — INSULIN LISPRO 100 [IU]/ML
1-5 INJECTION, SOLUTION INTRAVENOUS; SUBCUTANEOUS
Status: DISCONTINUED | OUTPATIENT
Start: 2024-05-26 | End: 2024-05-26 | Stop reason: HOSPADM

## 2024-05-26 RX ORDER — IPRATROPIUM BROMIDE AND ALBUTEROL SULFATE 2.5; .5 MG/3ML; MG/3ML
3 SOLUTION RESPIRATORY (INHALATION) ONCE
Status: COMPLETED | OUTPATIENT
Start: 2024-05-26 | End: 2024-05-26

## 2024-05-26 RX ORDER — TACROLIMUS 1 MG/1
1 CAPSULE ORAL 2 TIMES DAILY
Status: DISCONTINUED | OUTPATIENT
Start: 2024-05-26 | End: 2024-05-26

## 2024-05-26 RX ORDER — OXYBUTYNIN CHLORIDE 5 MG/1
10 TABLET, EXTENDED RELEASE ORAL
Status: DISCONTINUED | OUTPATIENT
Start: 2024-05-26 | End: 2024-05-26 | Stop reason: HOSPADM

## 2024-05-26 RX ORDER — PREGABALIN 75 MG/1
150 CAPSULE ORAL 2 TIMES DAILY
Status: DISCONTINUED | OUTPATIENT
Start: 2024-05-26 | End: 2024-05-26 | Stop reason: HOSPADM

## 2024-05-26 RX ORDER — ATOVAQUONE 750 MG/5ML
1500 SUSPENSION ORAL DAILY
Status: DISCONTINUED | OUTPATIENT
Start: 2024-05-26 | End: 2024-05-26 | Stop reason: HOSPADM

## 2024-05-26 RX ORDER — MORPHINE SULFATE 4 MG/ML
4 INJECTION, SOLUTION INTRAMUSCULAR; INTRAVENOUS ONCE
Status: COMPLETED | OUTPATIENT
Start: 2024-05-26 | End: 2024-05-26

## 2024-05-26 RX ORDER — PREDNISONE 10 MG/1
10 TABLET ORAL DAILY
Status: DISCONTINUED | OUTPATIENT
Start: 2024-05-26 | End: 2024-05-26 | Stop reason: HOSPADM

## 2024-05-26 RX ORDER — INSULIN LISPRO 100 [IU]/ML
3 INJECTION, SOLUTION INTRAVENOUS; SUBCUTANEOUS
Status: DISCONTINUED | OUTPATIENT
Start: 2024-05-26 | End: 2024-05-26

## 2024-05-26 RX ORDER — INSULIN GLARGINE 100 [IU]/ML
10 INJECTION, SOLUTION SUBCUTANEOUS EVERY MORNING
Status: DISCONTINUED | OUTPATIENT
Start: 2024-05-27 | End: 2024-05-26 | Stop reason: HOSPADM

## 2024-05-26 RX ORDER — AZITHROMYCIN 250 MG/1
250 TABLET, FILM COATED ORAL 3 TIMES WEEKLY
Status: DISCONTINUED | OUTPATIENT
Start: 2024-05-27 | End: 2024-05-26 | Stop reason: HOSPADM

## 2024-05-26 RX ORDER — ALBUTEROL SULFATE 90 UG/1
1 AEROSOL, METERED RESPIRATORY (INHALATION) EVERY 6 HOURS PRN
Status: DISCONTINUED | OUTPATIENT
Start: 2024-05-26 | End: 2024-05-26 | Stop reason: HOSPADM

## 2024-05-26 RX ORDER — DRONABINOL 2.5 MG/1
5 CAPSULE ORAL 3 TIMES DAILY
Status: DISCONTINUED | OUTPATIENT
Start: 2024-05-26 | End: 2024-05-26 | Stop reason: HOSPADM

## 2024-05-26 RX ORDER — INSULIN GLARGINE-YFGN 100 [IU]/ML
10 INJECTION, SOLUTION SUBCUTANEOUS EVERY MORNING
Status: DISCONTINUED | OUTPATIENT
Start: 2024-05-27 | End: 2024-05-26

## 2024-05-26 RX ORDER — VALACYCLOVIR HYDROCHLORIDE 500 MG/1
500 TABLET, FILM COATED ORAL DAILY
Status: DISCONTINUED | OUTPATIENT
Start: 2024-05-26 | End: 2024-05-26 | Stop reason: HOSPADM

## 2024-05-26 RX ORDER — OXYCODONE HYDROCHLORIDE 10 MG/1
10 TABLET ORAL EVERY 8 HOURS PRN
Status: DISCONTINUED | OUTPATIENT
Start: 2024-05-26 | End: 2024-05-26 | Stop reason: HOSPADM

## 2024-05-26 RX ORDER — ALPRAZOLAM 0.5 MG/1
1 TABLET ORAL
Status: DISCONTINUED | OUTPATIENT
Start: 2024-05-26 | End: 2024-05-26 | Stop reason: HOSPADM

## 2024-05-26 RX ORDER — KETOROLAC TROMETHAMINE 30 MG/ML
15 INJECTION, SOLUTION INTRAMUSCULAR; INTRAVENOUS ONCE
Status: COMPLETED | OUTPATIENT
Start: 2024-05-26 | End: 2024-05-26

## 2024-05-26 RX ORDER — ALBUTEROL SULFATE 2.5 MG/3ML
5 SOLUTION RESPIRATORY (INHALATION) ONCE
Status: COMPLETED | OUTPATIENT
Start: 2024-05-26 | End: 2024-05-26

## 2024-05-26 RX ADMIN — ALBUTEROL SULFATE 5 MG: 2.5 SOLUTION RESPIRATORY (INHALATION) at 15:32

## 2024-05-26 RX ADMIN — INSULIN LISPRO 1 UNITS: 100 INJECTION, SOLUTION INTRAVENOUS; SUBCUTANEOUS at 17:15

## 2024-05-26 RX ADMIN — IPRATROPIUM BROMIDE AND ALBUTEROL SULFATE 3 ML: 2.5; .5 SOLUTION RESPIRATORY (INHALATION) at 11:11

## 2024-05-26 RX ADMIN — SODIUM CHLORIDE 1000 ML: 0.9 INJECTION, SOLUTION INTRAVENOUS at 11:11

## 2024-05-26 RX ADMIN — PREGABALIN 150 MG: 75 CAPSULE ORAL at 17:35

## 2024-05-26 RX ADMIN — MEROPENEM 1000 MG: 1 INJECTION, POWDER, FOR SOLUTION INTRAVENOUS at 13:17

## 2024-05-26 RX ADMIN — APIXABAN 5 MG: 5 TABLET, FILM COATED ORAL at 17:35

## 2024-05-26 RX ADMIN — MORPHINE SULFATE 2 MG: 2 INJECTION, SOLUTION INTRAMUSCULAR; INTRAVENOUS at 15:30

## 2024-05-26 RX ADMIN — PANCRELIPASE 24000 UNITS: 120000; 24000; 76000 CAPSULE, DELAYED RELEASE PELLETS ORAL at 16:33

## 2024-05-26 RX ADMIN — CEFTRIAXONE SODIUM 2000 MG: 10 INJECTION, POWDER, FOR SOLUTION INTRAVENOUS at 11:37

## 2024-05-26 RX ADMIN — IPRATROPIUM BROMIDE 0.5 MG: 0.5 SOLUTION RESPIRATORY (INHALATION) at 15:32

## 2024-05-26 RX ADMIN — KETOROLAC TROMETHAMINE 15 MG: 30 INJECTION, SOLUTION INTRAMUSCULAR; INTRAVENOUS at 12:15

## 2024-05-26 RX ADMIN — VALACYCLOVIR HYDROCHLORIDE 500 MG: 500 TABLET, FILM COATED ORAL at 17:35

## 2024-05-26 RX ADMIN — MORPHINE SULFATE 4 MG: 4 INJECTION INTRAVENOUS at 13:13

## 2024-05-26 RX ADMIN — OXYCODONE HYDROCHLORIDE 10 MG: 10 TABLET ORAL at 18:59

## 2024-05-26 RX ADMIN — TACROLIMUS 2.5 MG: 1 CAPSULE ORAL at 17:35

## 2024-05-26 RX ADMIN — SODIUM CHLORIDE 1000 ML: 0.9 INJECTION, SOLUTION INTRAVENOUS at 15:32

## 2024-05-26 NOTE — ED PROVIDER NOTES
History  Chief Complaint   Patient presents with    Cough     C/o productive cough. Lung transplant in 2017. States she has been on doxycycline for a week and not getting better.      Pt presents to the ED with cough and SOb and fevers x 1-1.5 weeks  On doxy - 5x days  S/p lung transplant - has 1 lung-  and also in 2013, and 3 years ago had pneumonectomy.  Temp 100.9 - max.  [facility] - DR Tessie Davis to cough - coughing up thick green mucus.   HX of Cystic fibrosis - has required 2 lung transplants.   On eliquis - hx of clot - stable        Prior to Admission Medications   Prescriptions Last Dose Informant Patient Reported? Taking?   ALPRAZolam (XANAX) 1 mg tablet  Self No No   Sig: Take 1 tablet (1 mg total) by mouth daily at bedtime as needed for anxiety or sleep   Ascorbic Acid (vitamin C) 100 MG tablet  Self Yes No   Sig: Take 100 mg by mouth daily   BD Pen Needle Abi U/F 32G X 4 MM MISC  Self No No   Si times daily   Cholecalciferol (Vitamin D3) 125 MCG (5000 UT) TABS  Self Yes No   Sig: Take 5,000 Units by mouth daily   Continuous Blood Gluc  (Dexcom G7 ) SUKHI  Self No No   Sig: Use 1 each continuous   Continuous Glucose Sensor (Dexcom G7 Sensor)  Self No No   Sig: Use 1 Device every 10 days   Insulin Disposable Pump (Omnipod 5 G6 Intro, Gen 5,) KIT  Self No No   Sig: Use 1 Units continuous   Insulin Disposable Pump (Omnipod 5 G6 Pods, Gen 5,) MISC  Self No No   Sig: Use 1 Units every other day   Insulin Glargine-yfgn (Semglee, yfgn,) 100 UNIT/ML SOPN  Self No No   Sig: Inject 0.1 mL (10 Units total) under the skin every morning   Insulin Pen Needle (Comfort EZ Pen Needles) 33G X 4 MM MISC  Self No No   Sig: Use to inject insulin 4 times a day   Respiratory Therapy Supplies (Nebulizer) SUKHI  Self Yes No   Sig: Please dispense nebulizer machine   Turmeric 500 MG CAPS  Self Yes No   Sig: Take by mouth   acetaminophen (TYLENOL) 325 mg tablet  Self No No   Sig: Take 3 tablets (975 mg total)  by mouth every 8 (eight) hours   albuterol (2.5 mg/3 mL) 0.083 % nebulizer solution  Self No No   Sig: Take 6 mL (5 mg total) by nebulization 2 (two) times a day   albuterol (PROVENTIL HFA,VENTOLIN HFA) 90 mcg/act inhaler  Self Yes No   Sig: INHALE 2 PUFFS EVERY 8 HOURS AS NEEDED FOR SHORTNESS OF BREATH OR WHEEZING.   apixaban (ELIQUIS) 5 mg  Self No No   Sig: Take 1 tablet (5 mg total) by mouth 2 (two) times a day   atovaquone (MEPRON) 750 mg/5 mL suspension  Self Yes No   Sig: Take 1,500 mg by mouth daily   azithromycin (ZITHROMAX) 250 mg tablet  Self Yes No   Sig: Take 250 mg by mouth 3 (three) times a week   bisacodyl (DULCOLAX) 5 mg EC tablet  Self No No   Sig: Take 1 tablet (5 mg total) by mouth daily at bedtime   dexlansoprazole (DEXILANT) 60 MG capsule  Self No No   Sig: Take 1 capsule (60 mg total) by mouth daily TAKE 1 CAPSULE BY MOUTH EVERY DAY IN THE MORNING   doxycycline hyclate (VIBRA-TABS) 100 mg tablet  Self Yes No   Sig: Take 100 mg by mouth 2 (two) times a day   dronabinol (MARINOL) 5 MG capsule  Self No No   Sig: Take 1 capsule (5 mg total) by mouth 3 (three) times a day   famotidine (PEPCID) 40 MG tablet  Self No No   Sig: Take 1 tablet (40 mg total) by mouth daily at bedtime   insulin aspart (NovoLOG FlexPen) 100 UNIT/ML injection pen  Self No No   Sig: INJECT 3 UNITS UNDER THE SKIN 3 TIMES A DAY WITH MEALS   multivitamin (THERAGRAN) TABS  Self Yes No   Sig: Take 1 tablet by mouth daily   naloxone (NARCAN) 4 mg/0.1 mL nasal spray  Self Yes No   Si mg into each nostril   nystatin (MYCOSTATIN) 500,000 units/5 mL suspension  Self No No   Sig: Apply 5 mL (500,000 Units total) to the mouth or throat 4 (four) times a day   ondansetron (ZOFRAN) 4 mg tablet  Self Yes No   Sig: Take 4 mg by mouth   oxyCODONE (ROXICODONE) 10 MG TABS  Self No No   Sig: Take 1 tablet (10 mg total) by mouth every 8 (eight) hours as needed for severe pain Max Daily Amount: 30 mg   oxybutynin (DITROPAN-XL) 10 MG 24 hr  tablet   No No   Sig: Take 1 tablet (10 mg total) by mouth daily at bedtime   pancrelipase, Lip-Prot-Amyl, (CREON) 24,000 units  Self Yes No   Sig: Take 48,000 Units by mouth 3 (three) times a day with meals   polyethylene glycol (MIRALAX) 17 g packet  Self No No   Sig: Take 17 g by mouth 2 (two) times a day   predniSONE 10 mg tablet  Self Yes No   Sig: Take 10 mg by mouth daily   pregabalin (LYRICA) 150 mg capsule  Self No No   Sig: Take 1 capsule (150 mg total) by mouth 2 (two) times a day   promethazine-dextromethorphan (PHENERGAN-DM) 6.25-15 mg/5 mL oral syrup  Self No No   Sig: Take 5 mL by mouth 4 (four) times a day as needed for cough   tacrolimus (PROGRAF) 1 mg capsule  Self Yes No   Sig: Take 1 mg by mouth 2 (two) times a day Take 2 tablets and a 0.5 table to total 2.5 mg twice a day 5mg total daily   valACYclovir (VALTREX) 500 mg tablet  Self Yes No   Sig: Take 500 mg by mouth daily   vitamin B-12 (VITAMIN B-12) 1,000 mcg tablet  Self Yes No   Sig: Take by mouth daily      Facility-Administered Medications: None       Past Medical History:   Diagnosis Date    Abnormal uterine bleeding (AUB)     ABPA (allergic bronchopulmonary aspergillosis) (HCC) 07/08/2012    Acute kidney injury (HCC) 09/08/2017    Acute on chronic respiratory failure (HCC) 03/04/2017    Last Assessment & Plan:  Formatting of this note might be different from the original. Recurrent with clinical worsening with enlarging PTX, s/p multiple chest tubes, but new fevers 6/19 and worsening leukocytosis indicating infection as cause of 6/19 decompensation with profound hypoxia in setting of shunting through right lung and continued pneumothorax s/p 2nd chest tube placement.  6/27: Right    Anaphylaxis 06/06/2022    Anemia     Asthma     Chest pain 09/21/2021    Chronic pain     Chronic respiratory failure with hypoxia (HCC) 09/07/2021    COVID-19 06/03/2022    Cystic fibrosis (HCC)     Deep vein thrombosis (HCC)     Diabetes mellitus (HCC)      GERD (gastroesophageal reflux disease)     HBP (high blood pressure)     HCAP (healthcare-associated pneumonia) 03/06/2020    History of transfusion     Influenza B 02/23/2020    Kidney stone     Neutropenic fever  (HCC) 09/26/2020    Pneumonia due to Pseudomonas species (HCC) 10/28/2010    Last Assessment & Plan:  Formatting of this note might be different from the original. Polymicrobial pna w/ pseudomonas, M. Abscessus, Cadida glabrata/albicans    PONV (postoperative nausea and vomiting)     Pseudomonas aeruginosa infection 09/20/2012    Renal calculus     Squamous cell skin cancer 01/12/2023    SCCIS- Left upper thigh    Transplant recipient     Lung- 2013, 2017    Ulcerative colitis (HCC)     Wellness examination 02/04/2013    Formatting of this note might be different from the original. Discussed with patient and acknowledgement form signed on 2/4/2013 (ALYSHA)       Past Surgical History:   Procedure Laterality Date    APPENDECTOMY      AUGMENTATION MAMMAPLASTY Bilateral 2004    AUGMENTATION MAMMAPLASTY Right 2021    removed     BREAST SURGERY      Aug.     BRONCHOSCOPY      CHOLECYSTECTOMY      COLONOSCOPY      EGD  07/2019    Yeast Infect.     FL RETROGRADE PYELOGRAM  01/09/2020    FL RETROGRADE PYELOGRAM  02/04/2020    GASTROSCOPY  03/01/2024    GASTROSTOMY TUBE, PLACE      IR BALLOON-OCCLUDED ANTEGRADE TRANSVENOUS OBLITERATION (BATO)  11/22/2022    IR PORT PLACEMENT  03/22/2022    IR PORT REMOVAL  01/10/2022    IR PORT STRIPPING  11/25/2022    IR TUNNELED CENTRAL LINE PLACEMENT  01/13/2022    LUNG SURGERY      LUNG TRANSPLANT      X2    KY CYSTO BLADDER W/URETERAL CATHETERIZATION Right 01/09/2020    Procedure: CYSTOSCOPY RETROGRADE PYELOGRAM WITH INSERTION STENT URETERAL;  Surgeon: Patricio Perry MD;  Location: [facility];  Service: Urology    KY CYSTO/URETERO W/LITHOTRIPSY &INDWELL STENT INSRT Right 02/04/2020    Procedure: CYSTOSCOPY URETEROSCOPY WITH LITHOTRIPSY HOLMIUM LASER, RETROGRADE PYELOGRAM  AND INSERTION STENT URETERAL;  Surgeon: Patricio Perry MD;  Location: [facility];  Service: Urology    SKIN BIOPSY      US GUIDED VASCULAR ACCESS  2017       Family History   Problem Relation Age of Onset    Alcohol abuse Mother     Mental illness Mother     Depression Mother     Alcohol abuse Father     COPD Maternal Grandmother     Lung cancer Maternal Grandmother 65    Cancer Maternal Grandmother     COPD Maternal Grandfather     Cancer Maternal Grandfather     COPD Paternal Grandmother     Lung cancer Paternal Grandmother 65    No Known Problems Sister     No Known Problems Paternal Aunt     Breast cancer Neg Hx     Ovarian cancer Neg Hx     Colon cancer Neg Hx      I have reviewed and agree with the history as documented.    E-Cigarette/Vaping    E-Cigarette Use Never User      E-Cigarette/Vaping Substances    Nicotine No     THC No     CBD No     Flavoring No     Other No     Unknown No      Social History     Tobacco Use    Smoking status: Former     Current packs/day: 0.00     Average packs/day: 0.5 packs/day for 12.0 years (6.0 ttl pk-yrs)     Types: Cigarettes     Start date: 1993     Quit date: 2005     Years since quittin.4     Passive exposure: Past    Smokeless tobacco: Never   Vaping Use    Vaping status: Never Used   Substance Use Topics    Alcohol use: Yes     Alcohol/week: 2.0 standard drinks of alcohol     Types: 2 Glasses of wine per week     Comment: social    Drug use: Yes     Frequency: 2.0 times per week     Types: Marijuana     Comment: medical edible marijuana prescribed       Review of Systems   Constitutional:  Positive for chills and fever.   HENT:  Positive for congestion.    Respiratory:  Positive for cough and shortness of breath.    Cardiovascular:  Positive for chest pain.   Gastrointestinal: Negative.    Genitourinary: Negative.    All other systems reviewed and are negative.      Physical Exam  Physical Exam  Vitals and nursing note reviewed.    Constitutional:       Appearance: She is well-developed.   HENT:      Head: Normocephalic and atraumatic.      Right Ear: Tympanic membrane and external ear normal.      Left Ear: Tympanic membrane and external ear normal.   Eyes:      Conjunctiva/sclera: Conjunctivae normal.   Cardiovascular:      Rate and Rhythm: Normal rate and regular rhythm.      Heart sounds: Normal heart sounds.   Pulmonary:      Effort: Pulmonary effort is normal.      Breath sounds: Wheezing present.      Comments: Left sided,   Chest:      Chest wall: Tenderness present.   Abdominal:      General: Bowel sounds are normal.      Palpations: Abdomen is soft.      Tenderness: There is no abdominal tenderness.   Musculoskeletal:         General: Normal range of motion.      Cervical back: Neck supple.      Right lower leg: No edema.      Left lower leg: No edema.   Lymphadenopathy:      Cervical: No cervical adenopathy.   Skin:     General: Skin is warm.      Findings: No rash.   Neurological:      General: No focal deficit present.      Mental Status: She is alert and oriented to person, place, and time.   Psychiatric:         Behavior: Behavior normal.         Vital Signs  ED Triage Vitals   Temperature Pulse Respirations Blood Pressure SpO2   05/26/24 1004 05/26/24 1004 05/26/24 1004 05/26/24 1004 05/26/24 1004   100.2 °F (37.9 °C) 94 18 154/87 92 %      Temp Source Heart Rate Source Patient Position - Orthostatic VS BP Location FiO2 (%)   05/26/24 1004 05/26/24 1004 05/26/24 1004 05/26/24 1004 --   Oral Monitor Sitting Left arm       Pain Score       05/26/24 1215       7           Vitals:    05/26/24 1230 05/26/24 1330 05/26/24 1345 05/26/24 1400   BP: 125/71 95/66 114/67 102/61   Pulse: 78 74 71 68   Patient Position - Orthostatic VS: Lying Lying Lying Lying         Visual Acuity      ED Medications  Medications   sodium chloride 0.9 % bolus 1,000 mL (has no administration in time range)   albuterol inhalation solution 5 mg (has no  administration in time range)   ipratropium (ATROVENT) 0.02 % inhalation solution 0.5 mg (has no administration in time range)   morphine injection 2 mg (has no administration in time range)   insulin lispro (HumALOG/ADMELOG) 100 units/mL subcutaneous injection 3 Units (has no administration in time range)   pancrelipase (Lip-Prot-Amyl) (CREON) delayed release capsule 24,000 Units (has no administration in time range)   sodium chloride 0.9 % bolus 1,000 mL (1,000 mL Intravenous New Bag 5/26/24 1111)   ipratropium-albuterol (DUO-NEB) 0.5-2.5 mg/3 mL inhalation solution 3 mL (3 mL Nebulization Given 5/26/24 1111)   ketorolac (TORADOL) injection 15 mg (15 mg Intravenous Given 5/26/24 1215)   ceftriaxone (ROCEPHIN) 2 g/50 mL in dextrose IVPB (0 mg Intravenous Stopped 5/26/24 1207)   meropenem (MERREM) 1,000 mg in sodium chloride 0.9 % 100 mL IVPB (1,000 mg Intravenous New Bag 5/26/24 1317)   morphine injection 4 mg (4 mg Intravenous Given 5/26/24 1313)       Diagnostic Studies  Results Reviewed       Procedure Component Value Units Date/Time    HS Troponin I 2hr [[id number]]  (Normal) Collected: 05/26/24 1310    Lab Status: Final result Specimen: Blood from Arm, Left Updated: 05/26/24 1357     hs TnI 2hr 10 ng/L      Delta 2hr hsTnI -5 ng/L     HS Troponin I 4hr [[id number]]     Lab Status: No result Specimen: Blood     FLU/RSV/COVID - if FLU/RSV clinically relevant [[id number]]  (Normal) Collected: 05/26/24 1113    Lab Status: Final result Specimen: Nares from Nose Updated: 05/26/24 1159     SARS-CoV-2 Negative     INFLUENZA A PCR Negative     INFLUENZA B PCR Negative     RSV PCR Negative    Narrative:      FOR PEDIATRIC PATIENTS - copy/paste COVID Guidelines URL to browser: [email or web address]    SARS-CoV-2 assay is a Nucleic Acid Amplification assay intended for the  qualitative detection of nucleic acid from SARS-CoV-2 in nasopharyngeal  swabs. Results are for the  presumptive identification of SARS-CoV-2 RNA.    Positive results are indicative of infection with SARS-CoV-2, the virus  causing COVID-19, but do not rule out bacterial infection or co-infection  with other viruses. Laboratories within the [place] and its  territories are required to report all positive results to the appropriate  public health authorities. Negative results do not preclude SARS-CoV-2  infection and should not be used as the sole basis for treatment or other  patient management decisions. Negative results must be combined with  clinical observations, patient history, and epidemiological information.  This test has not been FDA cleared or approved.    This test has been authorized by FDA under an Emergency Use Authorization  (EUA). This test is only authorized for the duration of time the  declaration that circumstances exist justifying the authorization of the  emergency use of an in vitro diagnostic tests for detection of SARS-CoV-2  virus and/or diagnosis of COVID-19 infection under section 564(b)(1) of  the Act, 21 U.S.C. 360bbb-3(b)(1), unless the authorization is terminated  or revoked sooner. The test has been validated but independent review by FDA  and CLIA is pending.    Test performed using [employer] GeneXpert: This RT-PCR assay targets N2,  a region unique to SARS-CoV-2. A conserved region in the E-gene was chosen  for pan-Sarbecovirus detection which includes SARS-CoV-2.    According to CMS-2020-01-R, this platform meets the definition of high-throughput technology.    HS Troponin 0hr (reflex protocol) [[id number]]  (Normal) Collected: 05/26/24 1113    Lab Status: Final result Specimen: Blood from Arm, Left Updated: 05/26/24 1146     hs TnI 0hr 15 ng/L     Comprehensive metabolic panel [[id number]]  (Abnormal) Collected: 05/26/24 1113    Lab Status: Final result Specimen: Blood from Arm, Left Updated: 05/26/24 1139     Sodium 137 mmol/L      Potassium 4.6 mmol/L      Chloride 103  mmol/L      CO2 26 mmol/L      ANION GAP 8 mmol/L      BUN 24 mg/dL      Creatinine 1.43 mg/dL      Glucose 169 mg/dL      Calcium 8.9 mg/dL      AST 14 U/L      ALT 13 U/L      Alkaline Phosphatase 92 U/L      Total Protein 7.8 g/dL      Albumin 3.8 g/dL      Total Bilirubin 0.75 mg/dL      eGFR 43 ml/min/1.73sq m     Narrative:      National Kidney Disease Foundation guidelines for Chronic Kidney Disease (CKD):     Stage 1 with normal or high GFR (GFR > 90 mL/min/1.73 square meters)    Stage 2 Mild CKD (GFR = 60-89 mL/min/1.73 square meters)    Stage 3A Moderate CKD (GFR = 45-59 mL/min/1.73 square meters)    Stage 3B Moderate CKD (GFR = 30-44 mL/min/1.73 square meters)    Stage 4 Severe CKD (GFR = 15-29 mL/min/1.73 square meters)    Stage 5 End Stage CKD (GFR <15 mL/min/1.73 square meters)  Note: GFR calculation is accurate only with a steady state creatinine    Lactic acid, plasma (w/reflex if result > 2.0) [[id number]]  (Normal) Collected: 05/26/24 1113    Lab Status: Final result Specimen: Blood from Arm, Left Updated: 05/26/24 1139     LACTIC ACID 1.9 mmol/L     Narrative:      Result may be elevated if tourniquet was used during collection.    Blood culture #1 [[id number]] Collected: 05/26/24 1120    Lab Status: In process Specimen: Blood from Hand, Right Updated: 05/26/24 1124    CBC and differential [[id number]]  (Abnormal) Collected: 05/26/24 1113    Lab Status: Final result Specimen: Blood from Arm, Left Updated: 05/26/24 1122     WBC 16.73 Thousand/uL      RBC 4.32 Million/uL      Hemoglobin 12.8 g/dL      Hematocrit 40.5 %      MCV 94 fL      MCH 29.6 pg      MCHC 31.6 g/dL      RDW 13.5 %      MPV 10.8 fL      Platelets 205 Thousands/uL      nRBC 0 /100 WBCs      Segmented % 81 %      Immature Grans % 1 %      Lymphocytes % 11 %      Monocytes % 6 %      Eosinophils Relative 1 %      Basophils Relative 0 %      Absolute Neutrophils 13.45 Thousands/µL      Absolute Immature Grans 0.11 Thousand/uL       Absolute Lymphocytes 1.88 Thousands/µL      Absolute Monocytes 1.05 Thousand/µL      Eosinophils Absolute 0.21 Thousand/µL      Basophils Absolute 0.03 Thousands/µL     Blood culture #2 [[id number]] Collected: 05/26/24 1113    Lab Status: In process Specimen: Blood from Arm, Left Updated: 05/26/24 1119    POCT pregnancy, urine [[id number]]     Lab Status: No result                    XR chest 2 views   ED Interpretation by Lupe Chase PA-C (05/26 1130)   No change from prior      Final Result by Kathy Cadet MD (05/26 1137)      Left lung transplant with redemonstration of subtle nodularity in the peripheral left upper lung, similar to 5/21/2024, not present on the chest CT from August 2023, question mild bronchiolitis.      Right pneumonectomy.      The study was marked in EPIC for immediate notification.               Workstation performed: [id number]                    Procedures  ECG 12 Lead Documentation Only    Date/Time: 5/26/2024 12:21 AM    Performed by: Lupe Chase PA-C  Authorized by: Lupe Chase PA-C    Patient location:  ED  Previous ECG:     Previous ECG:  Compared to current    Comparison ECG info:  Aug 30, 23    Similarity:  No change  Rate:     ECG rate:  82    ECG rate assessment: normal    Rhythm:     Rhythm: sinus rhythm    Ectopy:     Ectopy: none    QRS:     QRS axis:  Normal  Conduction:     Conduction: normal    ST segments:     ST segments:  Normal  T waves:     T waves: normal             ED Course  ED Course as of 05/26/24 1447   Sun May 26, 2024   1129 WBC(!): 16.73  elivated   1144 LACTIC ACID: 1.9  normal   1144 Creatinine(!): 1.43  Mild increase - will give 2nd L fluids   1155 Discussed case with DR Dr. Alcon Lema - advises zosyn and meropenem - to cover for pseudomonas - ok to stay here on IV abx   1156 Dr. Alcon Lema  [phone] cell number - is on call all week- wants to be kept informed of case   1215 Spoke with DR Masterson - no pulm in house  - not comfortable keeping her here - discussed with PACS - will transfer to [facility]   1405 Delta 2hr hsTnI: -5  improved   1405 SARS-COV-2: Negative  negative   1434 Now wheezing - will give additional balta  Pt now hungry - requesting insulin with meals and needs creon    1442 Singed to Viraj NP - awaiting transport              HEART Risk Score      Flowsheet Row Most Recent Value   Heart Score Risk Calculator    History 0 Filed at: 05/26/2024 1446   ECG 0 Filed at: 05/26/2024 1446   Age 1 Filed at: 05/26/2024 1446   Risk Factors 1 Filed at: 05/26/2024 1446   Troponin 0 Filed at: 05/26/2024 1446   HEART Score 2 Filed at: 05/26/2024 1446                          SBIRT 22yo+      Flowsheet Row Most Recent Value   Initial Alcohol Screen: US AUDIT-C     1. How often do you have a drink containing alcohol? 0 Filed at: 05/26/2024 1127   2. How many drinks containing alcohol do you have on a typical day you are drinking?  0 Filed at: 05/26/2024 1127   3a. Male UNDER 65: How often do you have five or more drinks on one occasion? 0 Filed at: 05/26/2024 1127   3b. FEMALE Any Age, or MALE 65+: How often do you have 4 or more drinks on one occassion? 0 Filed at: 05/26/2024 1127   Audit-C Score 0 Filed at: 05/26/2024 1127   NICOLE: How many times in the past year have you...    Used an illegal drug or used a prescription medication for non-medical reasons? Never Filed at: 05/26/2024 1127                      Medical Decision Making  Hx lung transplant secondary to CF    Concern for pneumonia/sepsis will evaluate with x-ray and blood work.  Discussed case with Dr. Jennings.  Reached out to transplant team at [facility].  Also discussed case with our internal medicine providers feel that patient is best served to go to [facility]  Internal medicine request pt to go to [facility] 2nd to transplant.  Discussed IV antibiotics.    Amount and/or Complexity of Data Reviewed  External Data Reviewed: labs, radiology, ECG and notes.  Labs: ordered.  Decision-making details documented in ED Course.  Radiology: ordered and independent interpretation performed.  ECG/medicine tests: ordered and independent interpretation performed.  Discussion of management or test interpretation with external provider(s): DR Jennings,   Transplant team at [facility]    Risk  Prescription drug management.  Decision regarding hospitalization.             Disposition  Final diagnoses:   Pneumonia   Lung transplanted (HCC)     Time reflects when diagnosis was documented in both MDM as applicable and the Disposition within this note       Time User Action Codes Description Comment    5/26/2024 12:32 PM Lupe Chase [J18.9] Pneumonia     5/26/2024 12:32 PM Lupe Chase [Z94.2] Lung transplanted (HCC)           ED Disposition       ED Disposition   Transfer to Another Facility - Out of Network    Condition   --    Date/Time   Sun May 26, 2024 1232    Comment   Maximilian FLOR Sourav Mann should be transferred out to [facility].               Follow-up Information    None         Patient's Medications   Discharge Prescriptions    No medications on file       No discharge procedures on file.    PDMP Review         Value Time User    PDMP Reviewed  Yes 4/15/2024 11:38 AM Solis Avila MD            ED Provider  Electronically Signed by             Lupe Chase PA-C  05/26/24 [id number]

## 2024-05-26 NOTE — EMTALA/ACUTE CARE TRANSFER
[facility] EMERGENCY DEPARTMENT  [address]  [place] [address]  Dept: [phone]      EMTALA TRANSFER CONSENT    NAME Maximilian Mann                                         1975                              MRN [id number]    I have been informed of my rights regarding examination, treatment, and transfer   by Dr. Merrick Jennings MD    Benefits:  admission with IV abx and see transplant team    Risks:  worse in route      Consent for Transfer:  I acknowledge that my medical condition has been evaluated and explained to me by the emergency department physician or other qualified medical person and/or my attending physician, who has recommended that I be transferred to the service of DR Arellano   at [facility]  . The above potential benefits of such transfer, the potential risks associated with such transfer, and the probable risks of not being transferred have been explained to me, and I fully understand them.  The doctor has explained that, in my case, the benefits of transfer outweigh the risks.  I agree to be transferred.    I authorize the performance of emergency medical procedures and treatments upon me in both transit and upon arrival at the receiving facility.  Additionally, I authorize the release of any and all medical records to the receiving facility and request they be transported with me, if possible.  I understand that the safest mode of transportation during a medical emergency is an ambulance and that the Hospital advocates the use of this mode of transport. Risks of traveling to the receiving facility by car, including absence of medical control, life sustaining equipment, such as oxygen, and medical personnel has been explained to me and I fully understand them.    (JUSTIN CORRECT BOX BELOW)  [  ]  I consent to the stated transfer and to be transported by ambulance/helicopter.  [  ]  I consent to the stated transfer, but refuse transportation by  ambulance and accept full responsibility for my transportation by car.  I understand the risks of non-ambulance transfers and I exonerate the Hospital and its staff from any deterioration in my condition that results from this refusal.    X___________________________________________    DATE  24  TIME________  Signature of patient or legally responsible individual signing on patient behalf           RELATIONSHIP TO PATIENT_________________________          Provider Certification    NAME Maximilian Mann                                         1975                              MRN [id number]    A medical screening exam was performed on the above named patient.  Based on the examination:    Condition Necessitating Transfer The primary encounter diagnosis was Pneumonia. A diagnosis of Lung transplanted (HCC) was also pertinent to this visit.    Patient Condition:  stable    Reason for Transfer:  see transplant team    Transfer Requirements: Facility     Space available and qualified personnel available for treatment as acknowledged by    Agreed to accept transfer and to provide appropriate medical treatment as acknowledged by          Appropriate medical records of the examination and treatment of the patient are provided at the time of transfer   STAFF INITIAL WHEN COMPLETED _______  Transfer will be performed by qualified personnel from    and appropriate transfer equipment as required, including the use of necessary and appropriate life support measures.    Provider Certification: I have examined the patient and explained the following risks and benefits of being transferred/refusing transfer to the patient/family:         Based on these reasonable risks and benefits to the patient and/or the unborn child(joseph), and based upon the information available at the time of the patient’s examination, I certify that the medical benefits reasonably to be expected from the provision of appropriate medical  treatments at another medical facility outweigh the increasing risks, if any, to the individual’s medical condition, and in the case of labor to the unborn child, from effecting the transfer.    X____________________________________________ DATE 05/26/24        TIME_______      ORIGINAL - SEND TO MEDICAL RECORDS   COPY - SEND WITH PATIENT DURING TRANSFER

## 2024-06-01 LAB
BACTERIA BLD CULT: NORMAL
BACTERIA BLD CULT: NORMAL

## 2024-06-04 DIAGNOSIS — E84.9 CYSTIC FIBROSIS (HCC): ICD-10-CM

## 2024-06-04 RX ORDER — OXYCODONE HYDROCHLORIDE 10 MG/1
10 TABLET ORAL EVERY 8 HOURS PRN
Qty: 90 TABLET | Refills: 0 | Status: SHIPPED | OUTPATIENT
Start: 2024-06-04

## 2024-06-04 RX ORDER — ALPRAZOLAM 1 MG/1
1 TABLET ORAL
Qty: 30 TABLET | Refills: 0 | Status: SHIPPED | OUTPATIENT
Start: 2024-06-04

## 2024-06-11 ENCOUNTER — HOSPITAL ENCOUNTER (EMERGENCY)
Facility: HOSPITAL | Age: 49
Discharge: HOME | End: 2024-06-11
Attending: EMERGENCY MEDICINE
Payer: MEDICARE

## 2024-06-11 VITALS
RESPIRATION RATE: 21 BRPM | OXYGEN SATURATION: 95 % | HEART RATE: 88 BPM | DIASTOLIC BLOOD PRESSURE: 78 MMHG | SYSTOLIC BLOOD PRESSURE: 164 MMHG | TEMPERATURE: 97 F

## 2024-06-11 DIAGNOSIS — Z45.2 ENCOUNTER FOR CARE RELATED TO VASCULAR ACCESS PORT: Primary | ICD-10-CM

## 2024-06-11 PROCEDURE — 99284 EMERGENCY DEPT VISIT MOD MDM: CPT | Performed by: EMERGENCY MEDICINE

## 2024-06-11 PROCEDURE — 99283 EMERGENCY DEPT VISIT LOW MDM: CPT

## 2024-06-11 RX ORDER — HEPARIN 100 UNIT/ML
1000 SYRINGE INTRAVENOUS ONCE
Status: COMPLETED | OUTPATIENT
Start: 2024-06-11 | End: 2024-06-11

## 2024-06-11 RX ADMIN — HEPARIN 1000 UNITS: 100 SYRINGE at 10:35

## 2024-06-11 NOTE — ED PROVIDER NOTES
Pt Name: Maximilian Mann  MRN: [id number]  Birthdate 1975  Age/Sex: 48 y.o. female  Date of evaluation: 6/11/2024  PCP: Solis Avila MD    CHIEF COMPLAINT    Chief Complaint   Patient presents with    Vascular Access Problem     Patient reports being discharged from [facility] two days ago and was just called saying that the nurse who de accessed their port did not flush it with heparin and was advised to come to the ED to have it flushed with heparin. Patient has no complaints at this time.          HPI    48 y.o. female presenting with request for flushing of her port.  Patient has a vascular access port, states she was inpatient at [facility], states that it was flushed with saline prior to discharge but not with heparin.  Patient's home health nurse unable to flush line with heparin, she was directed to the ER to have a heparin flush placed.  She denies any symptoms at this time.      HPI      Past Medical and Surgical History    Past Medical History:   Diagnosis Date    Abnormal uterine bleeding (AUB)     ABPA (allergic bronchopulmonary aspergillosis) (HCC) 07/08/2012    Acute kidney injury (HCC) 09/08/2017    Acute on chronic respiratory failure (HCC) 03/04/2017    Last Assessment & Plan:  Formatting of this note might be different from the original. Recurrent with clinical worsening with enlarging PTX, s/p multiple chest tubes, but new fevers 6/19 and worsening leukocytosis indicating infection as cause of 6/19 decompensation with profound hypoxia in setting of shunting through right lung and continued pneumothorax s/p 2nd chest tube placement.  6/27: Right    Anaphylaxis 06/06/2022    Anemia     Asthma     Chest pain 09/21/2021    Chronic pain     Chronic respiratory failure with hypoxia (HCC) 09/07/2021    COVID-19 06/03/2022    Cystic fibrosis (HCC)     Deep vein thrombosis (HCC)     Diabetes mellitus (HCC)     GERD (gastroesophageal reflux disease)     HBP (high blood pressure)      HCAP (healthcare-associated pneumonia) 03/06/2020    History of transfusion     Influenza B 02/23/2020    Kidney stone     Neutropenic fever  (HCC) 09/26/2020    Pneumonia due to Pseudomonas species (HCC) 10/28/2010    Last Assessment & Plan:  Formatting of this note might be different from the original. Polymicrobial pna w/ pseudomonas, M. Abscessus, Cadida glabrata/albicans    PONV (postoperative nausea and vomiting)     Pseudomonas aeruginosa infection 09/20/2012    Renal calculus     Squamous cell skin cancer 01/12/2023    SCCIS- Left upper thigh    Transplant recipient     Lung- 2013, 2017    Ulcerative colitis (HCC)     Wellness examination 02/04/2013    Formatting of this note might be different from the original. Discussed with patient and acknowledgement form signed on 2/4/2013 (ALYSHA)       Past Surgical History:   Procedure Laterality Date    APPENDECTOMY      AUGMENTATION MAMMAPLASTY Bilateral 2004    AUGMENTATION MAMMAPLASTY Right 2021    removed     BREAST SURGERY      Aug.     BRONCHOSCOPY      CHOLECYSTECTOMY      COLONOSCOPY      EGD  07/2019    Yeast Infect.     FL RETROGRADE PYELOGRAM  01/09/2020    FL RETROGRADE PYELOGRAM  02/04/2020    GASTROSCOPY  03/01/2024    GASTROSTOMY TUBE, PLACE      IR BALLOON-OCCLUDED ANTEGRADE TRANSVENOUS OBLITERATION (BATO)  11/22/2022    IR PORT PLACEMENT  03/22/2022    IR PORT REMOVAL  01/10/2022    IR PORT STRIPPING  11/25/2022    IR TUNNELED CENTRAL LINE PLACEMENT  01/13/2022    LUNG SURGERY      LUNG TRANSPLANT      X2    MT CYSTO BLADDER W/URETERAL CATHETERIZATION Right 01/09/2020    Procedure: CYSTOSCOPY RETROGRADE PYELOGRAM WITH INSERTION STENT URETERAL;  Surgeon: Patricio Perry MD;  Location: [facility];  Service: Urology    MT CYSTO/URETERO W/LITHOTRIPSY &INDWELL STENT INSRT Right 02/04/2020    Procedure: CYSTOSCOPY URETEROSCOPY WITH LITHOTRIPSY HOLMIUM LASER, RETROGRADE PYELOGRAM AND INSERTION STENT URETERAL;  Surgeon: Patricio Perry MD;   Location: [facility];  Service: Urology    SKIN BIOPSY      US GUIDED VASCULAR ACCESS  2017       Family History   Problem Relation Age of Onset    Alcohol abuse Mother     Mental illness Mother     Depression Mother     Alcohol abuse Father     COPD Maternal Grandmother     Lung cancer Maternal Grandmother 65    Cancer Maternal Grandmother     COPD Maternal Grandfather     Cancer Maternal Grandfather     COPD Paternal Grandmother     Lung cancer Paternal Grandmother 65    No Known Problems Sister     No Known Problems Paternal Aunt     Breast cancer Neg Hx     Ovarian cancer Neg Hx     Colon cancer Neg Hx        Social History     Tobacco Use    Smoking status: Former     Current packs/day: 0.00     Average packs/day: 0.5 packs/day for 12.0 years (6.0 ttl pk-yrs)     Types: Cigarettes     Start date: 1993     Quit date: 2005     Years since quittin.4     Passive exposure: Past    Smokeless tobacco: Never   Vaping Use    Vaping status: Never Used   Substance Use Topics    Alcohol use: Yes     Alcohol/week: 2.0 standard drinks of alcohol     Types: 2 Glasses of wine per week     Comment: social    Drug use: Yes     Frequency: 2.0 times per week     Types: Marijuana     Comment: medical edible marijuana prescribed           Allergies    Allergies   Allergen Reactions    Bebtelovimab Anaphylaxis    Vancomycin Angioedema and Hives    Gabapentin Hallucinations    Ceftazidime Headache     Severe headaches after desensitization    Nsaids Other (See Comments)     Lung transplant increases risk of renal toxicity, call lung txp provider to discuss if needed       Home Medications    Prior to Admission medications    Medication Sig Start Date End Date Taking? Authorizing Provider   acetaminophen (TYLENOL) 325 mg tablet Take 3 tablets (975 mg total) by mouth every 8 (eight) hours 21   WAYNE Paredes   albuterol (2.5 mg/3 mL) 0.083 % nebulizer solution Take 6 mL (5 mg total) by nebulization 2 (two)  times a day 9/26/21   WAYNE Paredes   albuterol (PROVENTIL HFA,VENTOLIN HFA) 90 mcg/act inhaler INHALE 2 PUFFS EVERY 8 HOURS AS NEEDED FOR SHORTNESS OF BREATH OR WHEEZING. 11/29/23   Historical Provider, MD   ALPRAZolam (XANAX) 1 mg tablet Take 1 tablet (1 mg total) by mouth daily at bedtime as needed for anxiety or sleep 6/4/24   Solis Avila MD   apixaban (ELIQUIS) 5 mg Take 1 tablet (5 mg total) by mouth 2 (two) times a day 6/5/23 5/30/24  Solis Avila MD   Ascorbic Acid (vitamin C) 100 MG tablet Take 100 mg by mouth daily    Historical Provider, MD   atovaquone (MEPRON) 750 mg/5 mL suspension Take 1,500 mg by mouth daily    Historical Provider, MD   azithromycin (ZITHROMAX) 250 mg tablet Take 250 mg by mouth 3 (three) times a week 1/6/23   Historical Provider, MD   BD Pen Needle Abi U/F 32G X 4 MM MISC 4 times daily 11/8/22   Eve Polk MD   bisacodyl (DULCOLAX) 5 mg EC tablet Take 1 tablet (5 mg total) by mouth daily at bedtime 5/7/23   Armand Juarez MD   Cholecalciferol (Vitamin D3) 125 MCG (5000 UT) TABS Take 5,000 Units by mouth daily    Historical Provider, MD   Continuous Blood Gluc  (Dexcom G7 ) SUKHI Use 1 each continuous 7/31/23   Eve Polk MD   Continuous Glucose Sensor (Dexcom G7 Sensor) Use 1 Device every 10 days 5/20/24   Eve Polk MD   dexlansoprazole (DEXILANT) 60 MG capsule Take 1 capsule (60 mg total) by mouth daily TAKE 1 CAPSULE BY MOUTH EVERY DAY IN THE MORNING 12/11/23   Cesia Sellers PA-C   doxycycline hyclate (VIBRA-TABS) 100 mg tablet Take 100 mg by mouth 2 (two) times a day 3/21/24   Historical Provider, MD   dronabinol (MARINOL) 5 MG capsule Take 1 capsule (5 mg total) by mouth 3 (three) times a day 9/11/23   Gwendolyn Nuñez MD   famotidine (PEPCID) 40 MG tablet Take 1 tablet (40 mg total) by mouth daily at bedtime 9/11/23 9/5/24  Gwendolyn Nuñez MD   insulin aspart (NovoLOG FlexPen) 100 UNIT/ML injection pen INJECT 3  UNITS UNDER THE SKIN 3 TIMES A DAY WITH MEALS 4/18/24   Eve Polk MD   Insulin Disposable Pump (Omnipod 5 G6 Intro, Gen 5,) KIT Use 1 Units continuous 3/6/24   Eve Polk MD   Insulin Disposable Pump (Omnipod 5 G6 Pods, Gen 5,) MISC Use 1 Units every other day 3/6/24   Eve Polk MD   Insulin Glargine-yfgn (Semglee, yfgn,) 100 UNIT/ML SOPN Inject 0.1 mL (10 Units total) under the skin every morning 4/22/24   Eve Polk MD   Insulin Pen Needle (Comfort EZ Pen Needles) 33G X 4 MM MISC Use to inject insulin 4 times a day 11/3/22   Eve Polk MD   multivitamin (THERAGRAN) TABS Take 1 tablet by mouth daily    Historical Provider, MD   naloxone (NARCAN) 4 mg/0.1 mL nasal spray 4 mg into each nostril 6/6/22   Historical Provider, MD   nystatin (MYCOSTATIN) 500,000 units/5 mL suspension Apply 5 mL (500,000 Units total) to the mouth or throat 4 (four) times a day 4/26/24   Justin Ko MD   ondansetron (ZOFRAN) 4 mg tablet Take 4 mg by mouth 3/21/24   Historical Provider, MD   oxybutynin (DITROPAN-XL) 10 MG 24 hr tablet Take 1 tablet (10 mg total) by mouth daily at bedtime 5/21/24   Michelle Gayle PA-C   oxyCODONE (ROXICODONE) 10 MG TABS Take 1 tablet (10 mg total) by mouth every 8 (eight) hours as needed for severe pain Max Daily Amount: 30 mg 6/4/24   Solis Avila MD   pancrelipase, Lip-Prot-Amyl, (CREON) 24,000 units Take 48,000 Units by mouth 3 (three) times a day with meals    Historical Provider, MD   polyethylene glycol (MIRALAX) 17 g packet Take 17 g by mouth 2 (two) times a day 5/7/23   Armand Juarez MD   predniSONE 10 mg tablet Take 10 mg by mouth daily 4/27/21   Historical Provider, MD   pregabalin (LYRICA) 150 mg capsule Take 1 capsule (150 mg total) by mouth 2 (two) times a day 3/11/24   Solis Avila MD   promethazine-dextromethorphan (PHENERGAN-DM) 6.25-15 mg/5 mL oral syrup Take 5 mL by mouth 4 (four) times a day as needed for cough 5/21/24   Frandy Lawson MD    Respiratory Therapy Supplies (Nebulizer) SUKHI Please dispense nebulizer machine 7/16/21   Historical Provider, MD   tacrolimus (PROGRAF) 1 mg capsule Take 2.5 mg by mouth 2 (two) times a day Take 2 tablets and a 0.5 table to total 2.5 mg twice a day 5mg total daily 7/1/21   Historical Provider, MD   Turmeric 500 MG CAPS Take by mouth    Historical Provider, MD   valACYclovir (VALTREX) 500 mg tablet Take 500 mg by mouth daily 6/1/21   Historical Provider, MD   vitamin B-12 (VITAMIN B-12) 1,000 mcg tablet Take by mouth daily    Historical Provider, MD   cyclobenzaprine (FLEXERIL) 5 mg tablet Take 1-2 tabs every 8 hours as needed for muscle pain/spasm  Patient not taking: Reported on 10/13/2022 8/1/22 11/8/22  Solis Avila MD   traZODone (DESYREL) 50 mg tablet Take 0.5 tablets (25 mg total) by mouth daily at bedtime 9/26/21 11/8/22  WAYNE Paredes           Review of Systems    Review of Systems   Constitutional:  Negative for activity change, chills and fever.   HENT:  Negative for drooling and facial swelling.    Eyes:  Negative for pain, discharge and visual disturbance.   Respiratory:  Negative for apnea, cough, chest tightness, shortness of breath and wheezing.    Cardiovascular:  Negative for chest pain and leg swelling.   Gastrointestinal:  Negative for abdominal pain, constipation, diarrhea, nausea and vomiting.   Genitourinary:  Negative for difficulty urinating, dysuria and urgency.   Musculoskeletal:  Negative for arthralgias, back pain and gait problem.   Skin:  Negative for color change and rash.   Neurological:  Negative for dizziness, speech difficulty, weakness and headaches.   Psychiatric/Behavioral:  Negative for agitation, behavioral problems and confusion.            All other systems reviewed and negative.    Physical Exam      ED Triage Vitals [06/11/24 0919]   Temperature Pulse Respirations Blood Pressure SpO2   (!) 97 °F (36.1 °C) 88 21 164/78 95 %      Temp Source Heart Rate Source  Patient Position - Orthostatic VS BP Location FiO2 (%)   Temporal Monitor Sitting Left arm --      Pain Score       --               Physical Exam  Vitals and nursing note reviewed.   Constitutional:       General: She is not in acute distress.     Appearance: She is well-developed. She is not ill-appearing, toxic-appearing or diaphoretic.   HENT:      Head: Normocephalic and atraumatic.      Right Ear: External ear normal.      Left Ear: External ear normal.   Eyes:      Conjunctiva/sclera: Conjunctivae normal.      Pupils: Pupils are equal, round, and reactive to light.   Cardiovascular:      Rate and Rhythm: Normal rate and regular rhythm.      Pulses: Normal pulses.      Heart sounds: Normal heart sounds.   Pulmonary:      Effort: Pulmonary effort is normal. No respiratory distress.      Breath sounds: Normal breath sounds. No wheezing or rales.      Comments: Absent breath sounds on the right consistent with prior pneumonectomy  Abdominal:      General: There is no distension.      Palpations: Abdomen is soft.      Tenderness: There is no abdominal tenderness. There is no guarding or rebound.   Musculoskeletal:         General: No deformity. Normal range of motion.      Cervical back: Normal range of motion and neck supple.   Skin:     General: Skin is warm and dry.      Capillary Refill: Capillary refill takes less than 2 seconds.      Findings: No erythema or rash.   Neurological:      Mental Status: She is alert and oriented to person, place, and time.   Psychiatric:         Behavior: Behavior normal.         Thought Content: Thought content normal.         Judgment: Judgment normal.              Diagnostic Results      Labs:    Results Reviewed       None            All labs reviewed and utilized in the medical decision making process    Radiology:    No orders to display       All radiology studies independently viewed by me and interpreted by the radiologist.    Procedure    Procedures        ED Course of  Care and Re-Assessments      Heparin flush ordered and instilled by nursing    Medications   heparin flush PF (porcine) 100 units/mL injection 1,000 Units (1,000 Units Intracatheter Given 6/11/24 1035)           FINAL IMPRESSION    Final diagnoses:   Encounter for care related to vascular access port         DISPOSITION/PLAN    Presentation as above with request for Hep-Lock on Vascular access port.  No symptoms at this time, vital signs reassuring.  Port accessed, flushed with heparin, port D accessed, discharged with strict return precautions.  Time reflects when diagnosis was documented in both MDM as applicable and the Disposition within this note       Time User Action Codes Description Comment    6/11/2024 10:31 AM Sriram Moses Add [Z45.2] Encounter for care related to vascular access port           ED Disposition       ED Disposition   Discharge    Condition   Stable    Date/Time   Tue Jun 11, 2024 10:31 AM    Comment   Maximilian Mann discharge to home/self care.                   Follow-up Information       Follow up With Specialties Details Why Contact Info Additional Information    Replaced by [facility] Emergency Medicine Go to  If symptoms worsen [address] [facility] [id number] [phone] Replaced by [facility], [address] [place], [place], [address]    Solis Avila MD Internal Medicine Call  As needed [address] [facility] [address] [phone]                 PATIENT REFERRED TO:    Replaced by [facility]  [address] [facility] [address]  [phone]  Go to   If symptoms worsen    Solis Avila MD  [address] [facility] [id number] [phone]    Call   As needed      DISCHARGE MEDICATIONS:    Discharge Medication List as of 6/11/2024 10:31 AM        CONTINUE these medications which have NOT CHANGED    Details    acetaminophen (TYLENOL) 325 mg tablet Take 3 tablets (975 mg total) by mouth every 8 (eight) hours, Starting Sun 9/26/2021, No Print      albuterol (2.5 mg/3 mL) 0.083 % nebulizer solution Take 6 mL (5 mg total) by nebulization 2 (two) times a day, Starting Sun 9/26/2021, No Print      albuterol (PROVENTIL HFA,VENTOLIN HFA) 90 mcg/act inhaler INHALE 2 PUFFS EVERY 8 HOURS AS NEEDED FOR SHORTNESS OF BREATH OR WHEEZING., Historical Med      ALPRAZolam (XANAX) 1 mg tablet Take 1 tablet (1 mg total) by mouth daily at bedtime as needed for anxiety or sleep, Starting Tue 6/4/2024, Normal      apixaban (ELIQUIS) 5 mg Take 1 tablet (5 mg total) by mouth 2 (two) times a day, Starting Mon 6/5/2023, Until Thu 5/30/2024, No Print      Ascorbic Acid (vitamin C) 100 MG tablet Take 100 mg by mouth daily, Historical Med      atovaquone (MEPRON) 750 mg/5 mL suspension Take 1,500 mg by mouth daily, Historical Med      azithromycin (ZITHROMAX) 250 mg tablet Take 250 mg by mouth 3 (three) times a week, Starting Fri 1/6/2023, Historical Med      !! BD Pen Needle Abi U/F 32G X 4 MM MISC 4 times daily, Normal      bisacodyl (DULCOLAX) 5 mg EC tablet Take 1 tablet (5 mg total) by mouth daily at bedtime, Starting Sun 5/7/2023, Normal      Cholecalciferol (Vitamin D3) 125 MCG (5000 UT) TABS Take 5,000 Units by mouth daily, Historical Med      Continuous Blood Gluc  (Dexcom G7 ) SUKHI Use 1 each continuous, Starting Mon 7/31/2023, Normal      Continuous Glucose Sensor (Dexcom G7 Sensor) Use 1 Device every 10 days, Starting Mon 5/20/2024, Normal      dexlansoprazole (DEXILANT) 60 MG capsule Take 1 capsule (60 mg total) by mouth daily TAKE 1 CAPSULE BY MOUTH EVERY DAY IN THE MORNING, Starting Mon 12/11/2023, Normal      doxycycline hyclate (VIBRA-TABS) 100 mg tablet Take 100 mg by mouth 2 (two) times a day, Starting Thu 3/21/2024, Historical Med      dronabinol (MARINOL) 5 MG capsule Take 1 capsule (5 mg total) by mouth 3  (three) times a day, Starting Mon 9/11/2023, Normal      famotidine (PEPCID) 40 MG tablet Take 1 tablet (40 mg total) by mouth daily at bedtime, Starting Mon 9/11/2023, Until Thu 9/5/2024, Normal      insulin aspart (NovoLOG FlexPen) 100 UNIT/ML injection pen INJECT 3 UNITS UNDER THE SKIN 3 TIMES A DAY WITH MEALS, Normal      Insulin Disposable Pump (Omnipod 5 G6 Intro, Gen 5,) KIT Use 1 Units continuous, Starting Wed 3/6/2024, Normal      Insulin Disposable Pump (Omnipod 5 G6 Pods, Gen 5,) MISC Use 1 Units every other day, Starting Wed 3/6/2024, Normal      Insulin Glargine-yfgn (Semglee, yfgn,) 100 UNIT/ML SOPN Inject 0.1 mL (10 Units total) under the skin every morning, Starting Mon 4/22/2024, Normal      !! Insulin Pen Needle (Comfort EZ Pen Needles) 33G X 4 MM MISC Use to inject insulin 4 times a day, Normal      multivitamin (THERAGRAN) TABS Take 1 tablet by mouth daily, Historical Med      naloxone (NARCAN) 4 mg/0.1 mL nasal spray 4 mg into each nostril, Starting Mon 6/6/2022, Historical Med      nystatin (MYCOSTATIN) 500,000 units/5 mL suspension Apply 5 mL (500,000 Units total) to the mouth or throat 4 (four) times a day, Starting Fri 4/26/2024, Normal      ondansetron (ZOFRAN) 4 mg tablet Take 4 mg by mouth, Starting Thu 3/21/2024, Historical Med      oxybutynin (DITROPAN-XL) 10 MG 24 hr tablet Take 1 tablet (10 mg total) by mouth daily at bedtime, Starting Tue 5/21/2024, Normal      oxyCODONE (ROXICODONE) 10 MG TABS Take 1 tablet (10 mg total) by mouth every 8 (eight) hours as needed for severe pain Max Daily Amount: 30 mg, Starting Tue 6/4/2024, Normal      pancrelipase, Lip-Prot-Amyl, (CREON) 24,000 units Take 48,000 Units by mouth 3 (three) times a day with meals, Historical Med      polyethylene glycol (MIRALAX) 17 g packet Take 17 g by mouth 2 (two) times a day, Starting Sun 5/7/2023, No Print      predniSONE 10 mg tablet Take 10 mg by mouth daily, Starting Tue 4/27/2021, Historical Med     "  pregabalin (LYRICA) 150 mg capsule Take 1 capsule (150 mg total) by mouth 2 (two) times a day, Starting Mon 3/11/2024, Normal      promethazine-dextromethorphan (PHENERGAN-DM) 6.25-15 mg/5 mL oral syrup Take 5 mL by mouth 4 (four) times a day as needed for cough, Starting Tue 5/21/2024, Normal      Respiratory Therapy Supplies (Nebulizer) SUKHI Please dispense nebulizer machine, Historical Med      tacrolimus (PROGRAF) 1 mg capsule Take 2.5 mg by mouth 2 (two) times a day Take 2 tablets and a 0.5 table to total 2.5 mg twice a day 5mg total daily, Starting Thu 7/1/2021, Historical Med      Turmeric 500 MG CAPS Take by mouth, Historical Med      valACYclovir (VALTREX) 500 mg tablet Take 500 mg by mouth daily, Starting Tue 6/1/2021, Historical Med      vitamin B-12 (VITAMIN B-12) 1,000 mcg tablet Take by mouth daily, Historical Med       !! - Potential duplicate medications found. Please discuss with provider.          No discharge procedures on file.         Sriram Moses MD    Portions of the record may have been created with voice recognition software.  Occasional wrong word or \"sound alike\" substitutions may have occurred due to the inherent limitations of voice recognition software.  Please read the chart carefully and recognize, using context, where substitutions have occurred     Sriram Moses MD  06/11/24 1109    "

## 2024-06-11 NOTE — ED PROVIDER NOTES
"History  Chief Complaint   Patient presents with    Hypoglycemia - Symptomatic     Per EMS \"  called EMS d/t low blood sugar. Per  pt blood sugar was low in the 70's and did not take insulin. Pt became lethargic and unresponsive. \" Pt is currently alert and oriented x 4. Answering questions appropriately. Pt stated she is currently taking ABX for an upper respiratory infection. D10W is currently infusing.      48-year-old female presents emergency department for evaluation of low blood sugar.  Patient is a diabetic and does take insulin but held it today because she had poor blood sugar, was feeling tired and  called 911, they found her to have a blood sugar in the 70s, they gave her some D10 which is currently infusing she is awake and alert and oriented and somewhat fatigued but otherwise asymptomatic.        Prior to Admission Medications   Prescriptions Last Dose Informant Patient Reported? Taking?   Ascorbic Acid (vitamin C) 100 MG tablet  Self Yes No   Sig: Take 100 mg by mouth daily   BD Pen Needle Abi U/F 32G X 4 MM MISC  Self No No   Si times daily   Cholecalciferol (Vitamin D3) 125 MCG (5000 UT) TABS  Self Yes No   Sig: Take 5,000 Units by mouth daily   Continuous Blood Gluc  (Dexcom G7 ) SUKHI  Self No No   Sig: Use 1 each continuous   Continuous Glucose Sensor (Dexcom G7 Sensor)  Self No No   Sig: Use 1 Device every 10 days   Insulin Disposable Pump (Omnipod 5 G6 Intro, Gen 5,) KIT  Self No No   Sig: Use 1 Units continuous   Insulin Disposable Pump (Omnipod 5 G6 Pods, Gen 5,) MISC  Self No No   Sig: Use 1 Units every other day   Insulin Glargine-yfgn (Semglee, yfgn,) 100 UNIT/ML SOPN  Self No No   Sig: Inject 0.1 mL (10 Units total) under the skin every morning   Insulin Pen Needle (Comfort EZ Pen Needles) 33G X 4 MM MISC  Self No No   Sig: Use to inject insulin 4 times a day   Respiratory Therapy Supplies (Nebulizer) SUKHI  Self Yes No   Sig: Please dispense " nebulizer machine   Turmeric 500 MG CAPS  Self Yes No   Sig: Take by mouth   acetaminophen (TYLENOL) 325 mg tablet  Self No No   Sig: Take 3 tablets (975 mg total) by mouth every 8 (eight) hours   albuterol (2.5 mg/3 mL) 0.083 % nebulizer solution  Self No No   Sig: Take 6 mL (5 mg total) by nebulization 2 (two) times a day   albuterol (PROVENTIL HFA,VENTOLIN HFA) 90 mcg/act inhaler  Self Yes No   Sig: INHALE 2 PUFFS EVERY 8 HOURS AS NEEDED FOR SHORTNESS OF BREATH OR WHEEZING.   apixaban (ELIQUIS) 5 mg  Self No No   Sig: Take 1 tablet (5 mg total) by mouth 2 (two) times a day   atovaquone (MEPRON) 750 mg/5 mL suspension  Self Yes No   Sig: Take 1,500 mg by mouth daily   azithromycin (ZITHROMAX) 250 mg tablet  Self Yes No   Sig: Take 250 mg by mouth 3 (three) times a week   bisacodyl (DULCOLAX) 5 mg EC tablet  Self No No   Sig: Take 1 tablet (5 mg total) by mouth daily at bedtime   dexlansoprazole (DEXILANT) 60 MG capsule  Self No No   Sig: Take 1 capsule (60 mg total) by mouth daily TAKE 1 CAPSULE BY MOUTH EVERY DAY IN THE MORNING   doxycycline hyclate (VIBRA-TABS) 100 mg tablet  Self Yes No   Sig: Take 100 mg by mouth 2 (two) times a day   dronabinol (MARINOL) 5 MG capsule  Self No No   Sig: Take 1 capsule (5 mg total) by mouth 3 (three) times a day   famotidine (PEPCID) 40 MG tablet  Self No No   Sig: Take 1 tablet (40 mg total) by mouth daily at bedtime   insulin aspart (NovoLOG FlexPen) 100 UNIT/ML injection pen  Self No No   Sig: INJECT 3 UNITS UNDER THE SKIN 3 TIMES A DAY WITH MEALS   multivitamin (THERAGRAN) TABS  Self Yes No   Sig: Take 1 tablet by mouth daily   naloxone (NARCAN) 4 mg/0.1 mL nasal spray  Self Yes No   Si mg into each nostril   nystatin (MYCOSTATIN) 500,000 units/5 mL suspension  Self No No   Sig: Apply 5 mL (500,000 Units total) to the mouth or throat 4 (four) times a day   ondansetron (ZOFRAN) 4 mg tablet  Self Yes No   Sig: Take 4 mg by mouth   oxybutynin (DITROPAN-XL) 10 MG 24 hr  tablet   No No   Sig: Take 1 tablet (10 mg total) by mouth daily at bedtime   pancrelipase, Lip-Prot-Amyl, (CREON) 24,000 units  Self Yes No   Sig: Take 48,000 Units by mouth 3 (three) times a day with meals   polyethylene glycol (MIRALAX) 17 g packet  Self No No   Sig: Take 17 g by mouth 2 (two) times a day   predniSONE 10 mg tablet  Self Yes No   Sig: Take 10 mg by mouth daily   pregabalin (LYRICA) 150 mg capsule  Self No No   Sig: Take 1 capsule (150 mg total) by mouth 2 (two) times a day   promethazine-dextromethorphan (PHENERGAN-DM) 6.25-15 mg/5 mL oral syrup  Self No No   Sig: Take 5 mL by mouth 4 (four) times a day as needed for cough   tacrolimus (PROGRAF) 1 mg capsule  Self Yes No   Sig: Take 2.5 mg by mouth 2 (two) times a day Take 2 tablets and a 0.5 table to total 2.5 mg twice a day 5mg total daily   valACYclovir (VALTREX) 500 mg tablet  Self Yes No   Sig: Take 500 mg by mouth daily   vitamin B-12 (VITAMIN B-12) 1,000 mcg tablet  Self Yes No   Sig: Take by mouth daily      Facility-Administered Medications: None       Past Medical History:   Diagnosis Date    Abnormal uterine bleeding (AUB)     ABPA (allergic bronchopulmonary aspergillosis) (HCC) 07/08/2012    Acute kidney injury (HCC) 09/08/2017    Acute on chronic respiratory failure (HCC) 03/04/2017    Last Assessment & Plan:  Formatting of this note might be different from the original. Recurrent with clinical worsening with enlarging PTX, s/p multiple chest tubes, but new fevers 6/19 and worsening leukocytosis indicating infection as cause of 6/19 decompensation with profound hypoxia in setting of shunting through right lung and continued pneumothorax s/p 2nd chest tube placement.  6/27: Right    Anaphylaxis 06/06/2022    Anemia     Asthma     Chest pain 09/21/2021    Chronic pain     Chronic respiratory failure with hypoxia (HCC) 09/07/2021    COVID-19 06/03/2022    Cystic fibrosis (HCC)     Deep vein thrombosis (HCC)     Diabetes mellitus (HCC)      GERD (gastroesophageal reflux disease)     HBP (high blood pressure)     HCAP (healthcare-associated pneumonia) 03/06/2020    History of transfusion     Influenza B 02/23/2020    Kidney stone     Neutropenic fever  (HCC) 09/26/2020    Pneumonia due to Pseudomonas species (HCC) 10/28/2010    Last Assessment & Plan:  Formatting of this note might be different from the original. Polymicrobial pna w/ pseudomonas, M. Abscessus, Cadida glabrata/albicans    PONV (postoperative nausea and vomiting)     Pseudomonas aeruginosa infection 09/20/2012    Renal calculus     Squamous cell skin cancer 01/12/2023    SCCIS- Left upper thigh    Transplant recipient     Lung- 2013, 2017    Ulcerative colitis (HCC)     Wellness examination 02/04/2013    Formatting of this note might be different from the original. Discussed with patient and acknowledgement form signed on 2/4/2013 (ALYSHA)       Past Surgical History:   Procedure Laterality Date    APPENDECTOMY      AUGMENTATION MAMMAPLASTY Bilateral 2004    AUGMENTATION MAMMAPLASTY Right 2021    removed     BREAST SURGERY      Aug.     BRONCHOSCOPY      CHOLECYSTECTOMY      COLONOSCOPY      EGD  07/2019    Yeast Infect.     FL RETROGRADE PYELOGRAM  01/09/2020    FL RETROGRADE PYELOGRAM  02/04/2020    GASTROSCOPY  03/01/2024    GASTROSTOMY TUBE, PLACE      IR BALLOON-OCCLUDED ANTEGRADE TRANSVENOUS OBLITERATION (BATO)  11/22/2022    IR PORT PLACEMENT  03/22/2022    IR PORT REMOVAL  01/10/2022    IR PORT STRIPPING  11/25/2022    IR TUNNELED CENTRAL LINE PLACEMENT  01/13/2022    LUNG SURGERY      LUNG TRANSPLANT      X2    DC CYSTO BLADDER W/URETERAL CATHETERIZATION Right 01/09/2020    Procedure: CYSTOSCOPY RETROGRADE PYELOGRAM WITH INSERTION STENT URETERAL;  Surgeon: Patricio Perry MD;  Location: [facility];  Service: Urology    DC CYSTO/URETERO W/LITHOTRIPSY &INDWELL STENT INSRT Right 02/04/2020    Procedure: CYSTOSCOPY URETEROSCOPY WITH LITHOTRIPSY HOLMIUM LASER, RETROGRADE  PYELOGRAM AND INSERTION STENT URETERAL;  Surgeon: Patricio Perry MD;  Location: [facility];  Service: Urology    SKIN BIOPSY      US GUIDED VASCULAR ACCESS  2017       Family History   Problem Relation Age of Onset    Alcohol abuse Mother     Mental illness Mother     Depression Mother     Alcohol abuse Father     COPD Maternal Grandmother     Lung cancer Maternal Grandmother 65    Cancer Maternal Grandmother     COPD Maternal Grandfather     Cancer Maternal Grandfather     COPD Paternal Grandmother     Lung cancer Paternal Grandmother 65    No Known Problems Sister     No Known Problems Paternal Aunt     Breast cancer Neg Hx     Ovarian cancer Neg Hx     Colon cancer Neg Hx      I have reviewed and agree with the history as documented.    E-Cigarette/Vaping    E-Cigarette Use Never User      E-Cigarette/Vaping Substances    Nicotine No     THC No     CBD No     Flavoring No     Other No     Unknown No      Social History     Tobacco Use    Smoking status: Former     Current packs/day: 0.00     Average packs/day: 0.5 packs/day for 12.0 years (6.0 ttl pk-yrs)     Types: Cigarettes     Start date: 1993     Quit date: 2005     Years since quittin.4     Passive exposure: Past    Smokeless tobacco: Never   Vaping Use    Vaping status: Never Used   Substance Use Topics    Alcohol use: Yes     Alcohol/week: 2.0 standard drinks of alcohol     Types: 2 Glasses of wine per week     Comment: social    Drug use: Yes     Frequency: 2.0 times per week     Types: Marijuana     Comment: medical edible marijuana prescribed       Review of Systems   Constitutional:  Positive for fatigue. Negative for appetite change, chills and fever.   HENT:  Negative for sneezing and sore throat.    Eyes:  Negative for visual disturbance.   Respiratory:  Negative for cough, choking, chest tightness, shortness of breath and wheezing.    Cardiovascular:  Negative for chest pain and palpitations.   Gastrointestinal:  Negative  for abdominal pain, constipation, diarrhea, nausea and vomiting.   Genitourinary:  Negative for difficulty urinating and dysuria.   Neurological:  Negative for dizziness, weakness, light-headedness, numbness and headaches.   All other systems reviewed and are negative.      Physical Exam  Physical Exam  Constitutional:       General: She is not in acute distress.     Appearance: She is well-developed. She is not diaphoretic.   HENT:      Head: Normocephalic and atraumatic.   Eyes:      Pupils: Pupils are equal, round, and reactive to light.   Neck:      Vascular: No JVD.      Trachea: No tracheal deviation.   Cardiovascular:      Rate and Rhythm: Normal rate and regular rhythm.      Heart sounds: Normal heart sounds. No murmur heard.     No friction rub. No gallop.   Pulmonary:      Effort: Pulmonary effort is normal. No respiratory distress.      Breath sounds: Normal breath sounds. No wheezing or rales.   Abdominal:      General: Bowel sounds are normal. There is no distension.      Palpations: Abdomen is soft.      Tenderness: There is no abdominal tenderness. There is no guarding or rebound.   Skin:     General: Skin is warm and dry.      Coloration: Skin is not pale.   Neurological:      Mental Status: She is alert and oriented to person, place, and time.      Cranial Nerves: No cranial nerve deficit.      Motor: No abnormal muscle tone.   Psychiatric:         Behavior: Behavior normal.         Vital Signs  ED Triage Vitals   Temperature Pulse Respirations Blood Pressure SpO2   05/21/24 2300 05/21/24 2039 05/21/24 2039 05/21/24 2039 05/21/24 2039   97.9 °F (36.6 °C) 61 18 142/84 99 %      Temp src Heart Rate Source Patient Position - Orthostatic VS BP Location FiO2 (%)   -- 05/21/24 2039 05/21/24 2300 05/21/24 2300 --    Monitor Sitting Right arm       Pain Score       05/21/24 2039       7           Vitals:    05/21/24 2039 05/21/24 2100 05/21/24 2300 05/21/24 2330   BP: 142/84 123/76 120/76 118/78   Pulse: 61  60 61 59   Patient Position - Orthostatic VS:   Sitting Lying         Visual Acuity      ED Medications  Medications - No data to display    Diagnostic Studies  Results Reviewed       Procedure Component Value Units Date/Time    UA w Reflex to Microscopic w Reflex to Culture [[id number]] Collected: 05/21/24 2307    Lab Status: Final result Specimen: Urine, Other Updated: 05/21/24 2316     Color, UA Light Yellow     Clarity, UA Clear     Specific Gravity, UA 1.011     pH, UA 5.0     Leukocytes, UA Negative     Nitrite, UA Negative     Protein, UA Negative mg/dl      Glucose, UA Negative mg/dl      Ketones, UA Negative mg/dl      Urobilinogen, UA <2.0 mg/dl      Bilirubin, UA Negative     Occult Blood, UA Negative    Fingerstick Glucose (POCT) [[id number]]  (Normal) Collected: 05/21/24 2257    Lab Status: Final result Specimen: Blood Updated: 05/21/24 2258     POC Glucose 103 mg/dl     Beta Hydroxybutyrate [[id number]]  (Normal) Collected: 05/21/24 2204    Lab Status: Final result Specimen: Blood from Arm, Right Updated: 05/21/24 2227     Beta- Hydroxybutyrate 0.06 mmol/L     Comprehensive metabolic panel [[id number]]  (Abnormal) Collected: 05/21/24 2204    Lab Status: Final result Specimen: Blood from Central Venous Line Updated: 05/21/24 2227     Sodium 138 mmol/L      Potassium 3.6 mmol/L      Chloride 106 mmol/L      CO2 27 mmol/L      ANION GAP 5 mmol/L      BUN 24 mg/dL      Creatinine 0.88 mg/dL      Glucose 74 mg/dL      Calcium 8.5 mg/dL      AST 12 U/L      ALT 11 U/L      Alkaline Phosphatase 71 U/L      Total Protein 7.0 g/dL      Albumin 3.6 g/dL      Total Bilirubin 0.39 mg/dL      eGFR 79 ml/min/1.73sq m     Narrative:      National Kid[employer] Disease Foundation guidelines for Chronic Kidney Disease (CKD):     Stage 1 with normal or high GFR (GFR > 90 mL/min/1.73 square meters)    Stage 2 Mild CKD (GFR = 60-89 mL/min/1.73 square meters)    Stage 3A Moderate CKD (GFR = 45-59 mL/min/1.73 square meters)    Stage  3B Moderate CKD (GFR = 30-44 mL/min/1.73 square meters)    Stage 4 Severe CKD (GFR = 15-29 mL/min/1.73 square meters)    Stage 5 End Stage CKD (GFR <15 mL/min/1.73 square meters)  Note: GFR calculation is accurate only with a steady state creatinine    Blood gas, venous [[id number]] Collected: 05/21/24 2204    Lab Status: Final result Specimen: Blood from Line, Venous Updated: 05/21/24 2211     pH, Melvin 7.357     pCO2, Melvin 44.1 mm Hg      pO2, Melvin 37.7 mm Hg      HCO3, Melvin 24.2 mmol/L      Base Excess, Melvin -1.4 mmol/L      O2 Content, Melvin 12.1 ml/dL      O2 HGB, VENOUS 70.0 %     CBC and differential [[id number]]  (Abnormal) Collected: 05/21/24 2204    Lab Status: Final result Specimen: Blood from Central Venous Line Updated: 05/21/24 2210     WBC 7.30 Thousand/uL      RBC 3.87 Million/uL      Hemoglobin 11.4 g/dL      Hematocrit 36.0 %      MCV 93 fL      MCH 29.5 pg      MCHC 31.7 g/dL      RDW 13.3 %      MPV 10.0 fL      Platelets 147 Thousands/uL      nRBC 0 /100 WBCs      Segmented % 61 %      Immature Grans % 1 %      Lymphocytes % 23 %      Monocytes % 12 %      Eosinophils Relative 3 %      Basophils Relative 0 %      Absolute Neutrophils 4.53 Thousands/µL      Absolute Immature Grans 0.04 Thousand/uL      Absolute Lymphocytes 1.66 Thousands/µL      Absolute Monocytes 0.86 Thousand/µL      Eosinophils Absolute 0.19 Thousand/µL      Basophils Absolute 0.02 Thousands/µL     Fingerstick Glucose (POCT) [[id number]]  (Normal) Collected: 05/21/24 2044    Lab Status: Final result Specimen: Blood Updated: 05/21/24 2045     POC Glucose 68 mg/dl                    XR chest 1 view portable   Final Result by Oliva Briceno MD (05/22 0851)      No new consolidation         Workstation performed: [id number]                    Procedures  Procedures         ED Course                               SBIRT 22yo+      Flowsheet Row Most Recent Value   Initial Alcohol Screen: US AUDIT-C     1. How often do you have a drink  containing alcohol? 0 Filed at: 05/21/2024 2305   2. How many drinks containing alcohol do you have on a typical day you are drinking?  0 Filed at: 05/21/2024 2305   3b. FEMALE Any Age, or MALE 65+: How often do you have 4 or more drinks on one occassion? 0 Filed at: 05/21/2024 2305   Audit-C Score 0 Filed at: 05/21/2024 2305   NICOLE: How many times in the past year have you...    Used an illegal drug or used a prescription medication for non-medical reasons? Never Filed at: 05/21/2024 2305                      Medical Decision Making  8-year-old female with hypoglycemia now seems to have resolved.  Will observe, recheck glucose, check labs and assess.    Amount and/or Complexity of Data Reviewed  Labs: ordered.  Radiology: ordered.             Disposition  Final diagnoses:   Hypoglycemia     Time reflects when diagnosis was documented in both MDM as applicable and the Disposition within this note       Time User Action Codes Description Comment    5/21/2024 11:27 PM Cody Valencia Add [E16.2] Hypoglycemia           ED Disposition       ED Disposition   Discharge    Condition   Stable    Date/Time   Tue May 21, 2024 2326    Comment   Maximilian Mann discharge to home/self care.                   Follow-up Information       Follow up With Specialties Details Why Contact Info    Solis Avila MD Internal Medicine   [address] [place] [id number] [phone]              Discharge Medication List as of 5/21/2024 11:27 PM        CONTINUE these medications which have NOT CHANGED    Details   acetaminophen (TYLENOL) 325 mg tablet Take 3 tablets (975 mg total) by mouth every 8 (eight) hours, Starting Sun 9/26/2021, No Print      albuterol (2.5 mg/3 mL) 0.083 % nebulizer solution Take 6 mL (5 mg total) by nebulization 2 (two) times a day, Starting Sun 9/26/2021, No Print      albuterol (PROVENTIL HFA,VENTOLIN HFA) 90 mcg/act inhaler INHALE 2 PUFFS EVERY 8 HOURS AS NEEDED FOR SHORTNESS OF BREATH OR  WHEEZING., Historical Med      apixaban (ELIQUIS) 5 mg Take 1 tablet (5 mg total) by mouth 2 (two) times a day, Starting Mon 6/5/2023, Until Thu 5/30/2024, No Print      Ascorbic Acid (vitamin C) 100 MG tablet Take 100 mg by mouth daily, Historical Med      atovaquone (MEPRON) 750 mg/5 mL suspension Take 1,500 mg by mouth daily, Historical Med      azithromycin (ZITHROMAX) 250 mg tablet Take 250 mg by mouth 3 (three) times a week, Starting Fri 1/6/2023, Historical Med      !! BD Pen Needle Abi U/F 32G X 4 MM MISC 4 times daily, Normal      bisacodyl (DULCOLAX) 5 mg EC tablet Take 1 tablet (5 mg total) by mouth daily at bedtime, Starting Sun 5/7/2023, Normal      Cholecalciferol (Vitamin D3) 125 MCG (5000 UT) TABS Take 5,000 Units by mouth daily, Historical Med      Continuous Blood Gluc  (Dexcom G7 ) SUKHI Use 1 each continuous, Starting Mon 7/31/2023, Normal      Continuous Glucose Sensor (Dexcom G7 Sensor) Use 1 Device every 10 days, Starting Mon 5/20/2024, Normal      dexlansoprazole (DEXILANT) 60 MG capsule Take 1 capsule (60 mg total) by mouth daily TAKE 1 CAPSULE BY MOUTH EVERY DAY IN THE MORNING, Starting Mon 12/11/2023, Normal      doxycycline hyclate (VIBRA-TABS) 100 mg tablet Take 100 mg by mouth 2 (two) times a day, Starting Thu 3/21/2024, Historical Med      dronabinol (MARINOL) 5 MG capsule Take 1 capsule (5 mg total) by mouth 3 (three) times a day, Starting Mon 9/11/2023, Normal      famotidine (PEPCID) 40 MG tablet Take 1 tablet (40 mg total) by mouth daily at bedtime, Starting Mon 9/11/2023, Until Thu 9/5/2024, Normal      insulin aspart (NovoLOG FlexPen) 100 UNIT/ML injection pen INJECT 3 UNITS UNDER THE SKIN 3 TIMES A DAY WITH MEALS, Normal      Insulin Disposable Pump (Omnipod 5 G6 Intro, Gen 5,) KIT Use 1 Units continuous, Starting Wed 3/6/2024, Normal      Insulin Disposable Pump (Omnipod 5 G6 Pods, Gen 5,) MISC Use 1 Units every other day, Starting Wed 3/6/2024, Normal       Insulin Glargine-yfgn (Semglee, yfgn,) 100 UNIT/ML SOPN Inject 0.1 mL (10 Units total) under the skin every morning, Starting Mon 4/22/2024, Normal      !! Insulin Pen Needle (Comfort EZ Pen Needles) 33G X 4 MM MISC Use to inject insulin 4 times a day, Normal      multivitamin (THERAGRAN) TABS Take 1 tablet by mouth daily, Historical Med      naloxone (NARCAN) 4 mg/0.1 mL nasal spray 4 mg into each nostril, Starting Mon 6/6/2022, Historical Med      nystatin (MYCOSTATIN) 500,000 units/5 mL suspension Apply 5 mL (500,000 Units total) to the mouth or throat 4 (four) times a day, Starting Fri 4/26/2024, Normal      ondansetron (ZOFRAN) 4 mg tablet Take 4 mg by mouth, Starting Thu 3/21/2024, Historical Med      oxybutynin (DITROPAN-XL) 10 MG 24 hr tablet Take 1 tablet (10 mg total) by mouth daily at bedtime, Starting Tue 5/21/2024, Normal      pancrelipase, Lip-Prot-Amyl, (CREON) 24,000 units Take 48,000 Units by mouth 3 (three) times a day with meals, Historical Med      polyethylene glycol (MIRALAX) 17 g packet Take 17 g by mouth 2 (two) times a day, Starting Sun 5/7/2023, No Print      predniSONE 10 mg tablet Take 10 mg by mouth daily, Starting Tue 4/27/2021, Historical Med      pregabalin (LYRICA) 150 mg capsule Take 1 capsule (150 mg total) by mouth 2 (two) times a day, Starting Mon 3/11/2024, Normal      promethazine-dextromethorphan (PHENERGAN-DM) 6.25-15 mg/5 mL oral syrup Take 5 mL by mouth 4 (four) times a day as needed for cough, Starting Tue 5/21/2024, Normal      Respiratory Therapy Supplies (Nebulizer) SUKHI Please dispense nebulizer machine, Historical Med      tacrolimus (PROGRAF) 1 mg capsule Take 1 mg by mouth 2 (two) times a day Take 2 tablets and a 0.5 table to total 2.5 mg twice a day 5mg total daily, Starting Thu 7/1/2021, Historical Med      Turmeric 500 MG CAPS Take by mouth, Historical Med      valACYclovir (VALTREX) 500 mg tablet Take 500 mg by mouth daily, Starting Tue 6/1/2021, Historical  Med      vitamin B-12 (VITAMIN B-12) 1,000 mcg tablet Take by mouth daily, Historical Med      ALPRAZolam (XANAX) 1 mg tablet Take 1 tablet (1 mg total) by mouth daily at bedtime as needed for anxiety or sleep, Starting Tue 4/2/2024, Normal      oxyCODONE (ROXICODONE) 10 MG TABS Take 1 tablet (10 mg total) by mouth every 8 (eight) hours as needed for severe pain Max Daily Amount: 30 mg, Starting Tue 4/2/2024, Normal       !! - Potential duplicate medications found. Please discuss with provider.          No discharge procedures on file.    PDMP Review         Value Time User    PDMP Reviewed  Yes 6/4/2024 11:24 AM Solis Avila MD            ED Provider  Electronically Signed by             Cody Valencia MD  06/11/24 0897

## 2024-06-13 DIAGNOSIS — R05.1 ACUTE COUGH: Primary | ICD-10-CM

## 2024-06-13 DIAGNOSIS — F11.20 CONTINUOUS OPIOID DEPENDENCE (HCC): ICD-10-CM

## 2024-06-13 RX ORDER — PROMETHAZINE HYDROCHLORIDE AND CODEINE PHOSPHATE 6.25; 1 MG/5ML; MG/5ML
2.5 SYRUP ORAL EVERY 6 HOURS PRN
Qty: 118 ML | Refills: 0 | Status: SHIPPED | OUTPATIENT
Start: 2024-06-13 | End: 2024-06-20

## 2024-06-13 RX ORDER — NALOXONE HYDROCHLORIDE 4 MG/.1ML
1 SPRAY NASAL
Qty: 2 EACH | Refills: 5 | Status: SHIPPED | OUTPATIENT
Start: 2024-06-13

## 2024-06-19 DIAGNOSIS — E10.22 TYPE 1 DIABETES MELLITUS WITH DIABETIC CHRONIC KIDNEY DISEASE, UNSPECIFIED CKD STAGE (HCC): ICD-10-CM

## 2024-06-19 RX ORDER — PEN NEEDLE, DIABETIC 32GX 5/32"
NEEDLE, DISPOSABLE MISCELLANEOUS
Qty: 400 EACH | Refills: 1 | Status: SHIPPED | OUTPATIENT
Start: 2024-06-19

## 2024-06-19 NOTE — TELEPHONE ENCOUNTER
Med refill  BD Pen Needle Abi U/F 32G x 4mm [facility] [place]  [facility]

## 2024-06-20 DIAGNOSIS — E84.9 CYSTIC FIBROSIS (HCC): ICD-10-CM

## 2024-06-20 DIAGNOSIS — R05.1 ACUTE COUGH: Primary | ICD-10-CM

## 2024-06-20 RX ORDER — OXYCODONE HYDROCHLORIDE 10 MG/1
TABLET ORAL
Qty: 180 TABLET | Refills: 0 | Status: SHIPPED | OUTPATIENT
Start: 2024-06-25

## 2024-06-20 RX ORDER — DEXTROMETHORPHAN HYDROBROMIDE AND PROMETHAZINE HYDROCHLORIDE 15; 6.25 MG/5ML; MG/5ML
5 SYRUP ORAL 4 TIMES DAILY PRN
Qty: 180 ML | Refills: 1 | Status: SHIPPED | OUTPATIENT
Start: 2024-06-20

## 2024-06-27 ENCOUNTER — TELEPHONE (OUTPATIENT)
Age: 49
End: 2024-06-27

## 2024-06-27 DIAGNOSIS — K21.00 GASTROESOPHAGEAL REFLUX DISEASE WITH ESOPHAGITIS WITHOUT HEMORRHAGE: ICD-10-CM

## 2024-06-27 RX ORDER — DEXLANSOPRAZOLE 60 MG/1
60 CAPSULE, DELAYED RELEASE ORAL DAILY
Qty: 30 CAPSULE | Refills: 5 | Status: SHIPPED | OUTPATIENT
Start: 2024-06-27

## 2024-06-27 NOTE — TELEPHONE ENCOUNTER
Pharmacist calling to confirm prescription fill for Rx: Oxycodone with dosage increase.    Advised per Dr. Nuñez notes approval to fill.

## 2024-07-09 DIAGNOSIS — E84.9 CYSTIC FIBROSIS (HCC): ICD-10-CM

## 2024-07-09 DIAGNOSIS — E10.22 TYPE 1 DIABETES MELLITUS WITH DIABETIC CHRONIC KIDNEY DISEASE, UNSPECIFIED CKD STAGE (HCC): ICD-10-CM

## 2024-07-09 DIAGNOSIS — R05.1 ACUTE COUGH: ICD-10-CM

## 2024-07-09 PROCEDURE — 3066F NEPHROPATHY DOC TX: CPT | Performed by: INTERNAL MEDICINE

## 2024-07-09 RX ORDER — INSULIN ASPART 100 [IU]/ML
INJECTION, SOLUTION INTRAVENOUS; SUBCUTANEOUS
Qty: 15 ML | Refills: 1 | Status: SHIPPED | OUTPATIENT
Start: 2024-07-09

## 2024-07-10 RX ORDER — ALPRAZOLAM 1 MG/1
1 TABLET ORAL
Qty: 30 TABLET | Refills: 0 | Status: SHIPPED | OUTPATIENT
Start: 2024-07-10

## 2024-07-10 RX ORDER — DEXTROMETHORPHAN HYDROBROMIDE AND PROMETHAZINE HYDROCHLORIDE 15; 6.25 MG/5ML; MG/5ML
5 SYRUP ORAL 4 TIMES DAILY PRN
Qty: 180 ML | Refills: 0 | Status: SHIPPED | OUTPATIENT
Start: 2024-07-10

## 2024-07-17 DIAGNOSIS — M79.671 RIGHT FOOT PAIN: ICD-10-CM

## 2024-07-17 DIAGNOSIS — G89.4 CHRONIC PAIN SYNDROME: ICD-10-CM

## 2024-07-17 DIAGNOSIS — M54.16 LUMBAR RADICULOPATHY: ICD-10-CM

## 2024-07-18 RX ORDER — PREGABALIN 150 MG/1
CAPSULE ORAL
Qty: 180 CAPSULE | Refills: 0 | Status: SHIPPED | OUTPATIENT
Start: 2024-07-18

## 2024-07-19 ENCOUNTER — TELEPHONE (OUTPATIENT)
Dept: OTHER | Facility: HOSPITAL | Age: 49
End: 2024-07-19

## 2024-07-19 DIAGNOSIS — K21.00 GASTROESOPHAGEAL REFLUX DISEASE WITH ESOPHAGITIS WITHOUT HEMORRHAGE: ICD-10-CM

## 2024-07-19 RX ORDER — DEXLANSOPRAZOLE 60 MG/1
CAPSULE, DELAYED RELEASE ORAL
Qty: 100 CAPSULE | Refills: 1 | Status: SHIPPED | OUTPATIENT
Start: 2024-07-19

## 2024-07-19 NOTE — TELEPHONE ENCOUNTER
Forwarding to PA team for review.       Reason for call:   [x] Prior Auth  [] Other:     Caller:  [] Patient  [x] Pharmacy  Name: [facility]/pharmacy #[id number] - [place] - [address]     Callback Number: [phone]     Medication: dexlansoprazole (DEXILANT) 60 MG capsule TAKE 1 CAPSULE BY MOUTH EVERY DAY IN THE MORNING,       Ordering Provider:   [] PCP/Provider -   [x] Speciality/Provider - Gastro

## 2024-07-20 NOTE — TELEPHONE ENCOUNTER
PA for dexlansoprazole  (Script was for 100 quantity--- put auth in for 100 per 100 days)  Submitted via    [x]CMM-[id number] PA Case ID: [id number]  []SurescriFindTheBest-Case ID #   []Faxed to plan   []Other website   []Phone call Case ID #     Office notes sent, clinical questions answered. Awaiting determination    Turnaround time for your insurance to make a decision on your Prior Authorization can take 7-21 business days.

## 2024-07-22 NOTE — TELEPHONE ENCOUNTER
PA for dexlansoprazole Approved     Date(s) approved uttyrel 7/20/2025    Case #request ID [id number]    Patient advised by          [x] [employer]t Message  [] Phone call   []LMOM  [x]L/M to call office as no active Communication consent on file  []Unable to leave detailed message as VM not approved on Communication consent       Pharmacy advised by    [x]Fax  []Phone call    Approval letter scanned into Media Yes

## 2024-07-30 DIAGNOSIS — E84.9 CYSTIC FIBROSIS (HCC): ICD-10-CM

## 2024-07-30 DIAGNOSIS — R05.1 ACUTE COUGH: ICD-10-CM

## 2024-07-30 RX ORDER — DEXTROMETHORPHAN HYDROBROMIDE AND PROMETHAZINE HYDROCHLORIDE 15; 6.25 MG/5ML; MG/5ML
5 SYRUP ORAL 4 TIMES DAILY PRN
Qty: 180 ML | Refills: 0 | Status: SHIPPED | OUTPATIENT
Start: 2024-07-30

## 2024-07-30 RX ORDER — OXYCODONE HYDROCHLORIDE 10 MG/1
TABLET ORAL
Qty: 180 TABLET | Refills: 0 | Status: SHIPPED | OUTPATIENT
Start: 2024-07-30 | End: 2024-08-01 | Stop reason: SDUPTHER

## 2024-08-01 ENCOUNTER — OFFICE VISIT (OUTPATIENT)
Dept: GASTROENTEROLOGY | Facility: CLINIC | Age: 49
End: 2024-08-01
Payer: COMMERCIAL

## 2024-08-01 ENCOUNTER — PREP FOR PROCEDURE (OUTPATIENT)
Dept: GASTROENTEROLOGY | Facility: CLINIC | Age: 49
End: 2024-08-01

## 2024-08-01 ENCOUNTER — TELEPHONE (OUTPATIENT)
Dept: GASTROENTEROLOGY | Facility: CLINIC | Age: 49
End: 2024-08-01

## 2024-08-01 VITALS
HEIGHT: 62 IN | WEIGHT: 121.4 LBS | DIASTOLIC BLOOD PRESSURE: 83 MMHG | BODY MASS INDEX: 22.34 KG/M2 | SYSTOLIC BLOOD PRESSURE: 129 MMHG | HEART RATE: 74 BPM

## 2024-08-01 DIAGNOSIS — K31.84 GASTROPARESIS: ICD-10-CM

## 2024-08-01 DIAGNOSIS — K21.00 GASTROESOPHAGEAL REFLUX DISEASE WITH ESOPHAGITIS WITHOUT HEMORRHAGE: Primary | ICD-10-CM

## 2024-08-01 DIAGNOSIS — R12 HEARTBURN: ICD-10-CM

## 2024-08-01 DIAGNOSIS — E84.9 CYSTIC FIBROSIS (HCC): ICD-10-CM

## 2024-08-01 DIAGNOSIS — K86.89 PANCREATIC INSUFFICIENCY: ICD-10-CM

## 2024-08-01 PROCEDURE — 99214 OFFICE O/P EST MOD 30 MIN: CPT | Performed by: INTERNAL MEDICINE

## 2024-08-01 PROCEDURE — 3074F SYST BP LT 130 MM HG: CPT | Performed by: INTERNAL MEDICINE

## 2024-08-01 PROCEDURE — 3079F DIAST BP 80-89 MM HG: CPT | Performed by: INTERNAL MEDICINE

## 2024-08-01 RX ORDER — OXYCODONE HYDROCHLORIDE 10 MG/1
TABLET ORAL
Qty: 180 TABLET | Refills: 0 | Status: SHIPPED | OUTPATIENT
Start: 2024-08-01

## 2024-08-01 NOTE — TELEPHONE ENCOUNTER
Called and spoke to Lupe Licea at [phone] to inform of procedure date.     Will fax Eliquis clearance one month prior to the procedure.

## 2024-08-01 NOTE — TELEPHONE ENCOUNTER
Scheduled date of EGD(as of today): 10/23/24  Physician performing EGD: Dr. Ko  Location of EGD:   Instructions reviewed with patient by: tl given at [facility]  Clearances:     EGD w bravo  Diabetic  Eliqu- 2 day hold    Dr. Chavez Kurtz  [facility]  [phone]

## 2024-08-01 NOTE — PROGRESS NOTES
[facility] - Outpatient Follow-up Note  Maximilian FLOR Sourav Mann 48 y.o. female MRN: [id number]  Encounter: [id number]          ASSESSMENT AND PLAN:      1. Gastroesophageal reflux disease with esophagitis without hemorrhage  -     EGD; Future; Expected date: 08/01/2024  -     Bravo pH Monitoring (must also order EGD separate); Future; Expected date: 08/01/2024  2. Heartburn  -     EGD; Future; Expected date: 08/01/2024  3. Gastroparesis  4. Pancreatic insufficiency  5. Cystic fibrosis (HCC)    History of multiple medical problems are listed, has continued heartburn acid reflux indigestion and even regurgitation symptoms despite TIF and taking Dexilant and Pepcid.  She is concerned about any aspiration as well.  EGD biopsy results reviewed, will schedule an upper GI series which we advised the months ago to see if she is truly has reflux and extensive, also schedule Bravo study 96-hour pH monitoring of the esophagus to objectively see truly reflux causing her symptoms.  Plans thereafter, if she truly has ongoing reflux then may have to consider surgical/laparoscopic repair of the GE junction LES fundoplication.  Cussed with patient, she understands and agrees.    ______________________________________________________________________    SUBJECTIVE:      Patient with history of multiple medical problems to include cystic fibrosis, lung transplant done twice, gastroparesis quite severe by nuclear gastric emptying study testing, has heartburn acid reflux indigestion and is some regurgitation, had a TIF placement, EGD shows that the GE junction seems to be fairly snug, she is taking Dexilant and Pepcid and still has heartburn acid reflux regurgitation symptoms appetite is fair weight stable denies any blood in stools melena hematochezia, taking multiple medications.  Some of the current and prior extensive records some of them are reviewed.      REVIEW OF SYSTEMS IS OTHERWISE  NEGATIVE.      Historical Information   Past Medical History:   Diagnosis Date   • Abnormal uterine bleeding (AUB)    • ABPA (allergic bronchopulmonary aspergillosis) ([facility]) 07/08/2012   • Acute kidney injury (HCC) 09/08/2017   • Acute on chronic respiratory failure ([facility]) 03/04/2017    Last Assessment & Plan:  Formatting of this note might be different from the original. Recurrent with clinical worsening with enlarging PTX, s/p multiple chest tubes, but new fevers 6/19 and worsening leukocytosis indicating infection as cause of 6/19 decompensation with profound hypoxia in setting of shunting through right lung and continued pneumothorax s/p 2nd chest tube placement.  6/27: Right   • Anaphylaxis 06/06/2022   • Anemia    • Asthma    • Chest pain 09/21/2021   • Chronic pain    • Chronic respiratory failure with hypoxia ([facility]) 09/07/2021   • COVID-19 06/03/2022   • Cystic fibrosis ([facility])    • Deep vein thrombosis ([facility])    • Diabetes mellitus ([facility])    • GERD (gastroesophageal reflux disease)    • HBP (high blood pressure)    • HCAP (healthcare-associated pneumonia) 03/06/2020   • History of transfusion    • Influenza B 02/23/2020   • Kidney stone    • Neutropenic fever  ([facility]) 09/26/2020   • Pneumonia due to Pseudomonas species ([facility]) 10/28/2010    Last Assessment & Plan:  Formatting of this note might be different from the original. Polymicrobial pna w/ pseudomonas, M. Abscessus, Cadida glabrata/albicans   • PONV (postoperative nausea and vomiting)    • Pseudomonas aeruginosa infection 09/20/2012   • Renal calculus    • Squamous cell skin cancer 01/12/2023    SCCIS- Left upper thigh   • Transplant recipient     Lung- 2013, 2017   • Ulcerative colitis ([facility])    • Wellness examination 02/04/2013    Formatting of this note might be different from the original. Discussed with patient and acknowledgement form signed on 2/4/2013 (ALYSHA)     Past Surgical History:   Procedure Laterality Date   • APPENDECTOMY     • AUGMENTATION  MAMMAPLASTY Bilateral    • AUGMENTATION MAMMAPLASTY Right     removed    • BREAST SURGERY      Aug.    • BRONCHOSCOPY     • CHOLECYSTECTOMY     • COLONOSCOPY     • EGD  2019    [facility].    • FL RETROGRADE PYELOGRAM  2020   • FL RETROGRADE PYELOGRAM  2020   • GASTROSCOPY  2024   • GASTROSTOMY TUBE, PLACE     • IR BALLOON-OCCLUDED ANTEGRADE TRANSVENOUS OBLITERATION (BATO)  2022   • IR PORT PLACEMENT  2022   • IR PORT REMOVAL  01/10/2022   • IR PORT STRIPPING  2022   • IR TUNNELED CENTRAL LINE PLACEMENT  2022   • LUNG SURGERY     • LUNG TRANSPLANT      X2   • RI CYSTO BLADDER W/URETERAL CATHETERIZATION Right 2020    Procedure: CYSTOSCOPY RETROGRADE PYELOGRAM WITH INSERTION STENT URETERAL;  Surgeon: Patricio Perry MD;  Location: [facility];  Service: Urology   • RI CYSTO/URETERO W/LITHOTRIPSY &INDWELL STENT INSRT Right 2020    Procedure: CYSTOSCOPY URETEROSCOPY WITH LITHOTRIPSY HOLMIUM LASER, RETROGRADE PYELOGRAM AND INSERTION STENT URETERAL;  Surgeon: Patricio Perry MD;  Location: [facility];  Service: Urology   • SKIN BIOPSY     • US GUIDED VASCULAR ACCESS  2017     Social History   Social History     Substance and Sexual Activity   Alcohol Use Yes   • Alcohol/week: 2.0 standard drinks of alcohol   • Types: 2 Glasses of wine per week    Comment: social     Social History     Substance and Sexual Activity   Drug Use Yes   • Frequency: 2.0 times per week   • Types: Marijuana    Comment: medical edible marijuana prescribed     Social History     Tobacco Use   Smoking Status Former   • Current packs/day: 0.00   • Average packs/day: 0.5 packs/day for 12.0 years (6.0 ttl pk-yrs)   • Types: Cigarettes   • Start date: 1993   • Quit date: 2005   • Years since quittin.5   • Passive exposure: Past   Smokeless Tobacco Never     Family History   Problem Relation Age of Onset   • Alcohol abuse Mother    • Mental illness Mother    •  Depression Mother    • Alcohol abuse Father    • COPD Maternal Grandmother    • Lung cancer Maternal Grandmother 65   • Cancer Maternal Grandmother    • COPD Maternal Grandfather    • Cancer Maternal Grandfather    • COPD Paternal Grandmother    • Lung cancer Paternal Grandmother 65   • No Known Problems Sister    • No Known Problems Paternal Aunt    • Breast cancer Neg Hx    • Ovarian cancer Neg Hx    • Colon cancer Neg Hx        Meds/Allergies       Current Outpatient Medications:   •  acetaminophen (TYLENOL) 325 mg tablet  •  albuterol (2.5 mg/3 mL) 0.083 % nebulizer solution  •  albuterol (PROVENTIL HFA,VENTOLIN HFA) 90 mcg/act inhaler  •  ALPRAZolam (XANAX) 1 mg tablet  •  apixaban (ELIQUIS) 5 mg  •  Ascorbic Acid (vitamin C) 100 MG tablet  •  atovaquone (MEPRON) 750 mg/5 mL suspension  •  azithromycin (ZITHROMAX) 250 mg tablet  •  BD Pen Needle Abi U/F 32G X 4 MM MISC  •  bisacodyl (DULCOLAX) 5 mg EC tablet  •  Cholecalciferol (Vitamin D3) 125 MCG (5000 UT) TABS  •  Continuous Blood Gluc  (Dexcom G7 ) SUKHI  •  Continuous Glucose Sensor (Dexcom G7 Sensor)  •  dexlansoprazole (DEXILANT) 60 MG capsule  •  dronabinol (MARINOL) 5 MG capsule  •  famotidine (PEPCID) 40 MG tablet  •  insulin aspart (NovoLOG FlexPen) 100 UNIT/ML injection pen  •  Insulin Disposable Pump (Omnipod 5 G6 Intro, Gen 5,) KIT  •  Insulin Disposable Pump (Omnipod 5 G6 Pods, Gen 5,) MISC  •  Insulin Glargine-yfgn (Semglee, yfgn,) 100 UNIT/ML SOPN  •  Insulin Pen Needle (Comfort EZ Pen Needles) 33G X 4 MM MISC  •  multivitamin (THERAGRAN) TABS  •  naloxone (NARCAN) 4 mg/0.1 mL nasal spray  •  oxybutynin (DITROPAN-XL) 10 MG 24 hr tablet  •  oxyCODONE (ROXICODONE) 10 MG TABS  •  pancrelipase, Lip-Prot-Amyl, (CREON) 24,000 units  •  polyethylene glycol (MIRALAX) 17 g packet  •  predniSONE 10 mg tablet  •  pregabalin (LYRICA) 150 mg capsule  •  promethazine-dextromethorphan (PHENERGAN-DM) 6.25-15 mg/5 mL oral syrup  •  Respiratory  "Therapy Supplies (Nebulizer) SUKHI  •  tacrolimus (PROGRAF) 1 mg capsule  •  Turmeric 500 MG CAPS  •  valACYclovir (VALTREX) 500 mg tablet  •  vitamin B-12 (VITAMIN B-12) 1,000 mcg tablet  •  doxycycline hyclate (VIBRA-TABS) 100 mg tablet  •  nystatin (MYCOSTATIN) 500,000 units/5 mL suspension  •  ondansetron (ZOFRAN) 4 mg tablet    Allergies   Allergen Reactions   • Bebtelovimab Anaphylaxis   • Vancomycin Angioedema and Hives   • Gabapentin Hallucinations   • Ceftazidime Headache     Severe headaches after desensitization   • Nsaids Other (See Comments)     Lung transplant increases risk of renal toxicity, call lung txp provider to discuss if needed           Objective     Blood pressure 129/83, pulse 74, height 5' 2\" (1.575 m), weight 55.1 kg (121 lb 6.4 oz), not currently breastfeeding. Body mass index is 22.2 kg/m².      PHYSICAL EXAM:      General Appearance:   Alert, cooperative, no distress   HEENT:   Normocephalic, atraumatic, anicteric.     Neck:  Supple, symmetrical, trachea midline   Lungs:   Clear to auscultation bilaterally; no rales, rhonchi or wheezing; respirations unlabored    Heart::   Regular rate and rhythm; no murmur.   Abdomen:   Soft, non-tender, non-distended; normal bowel sounds; no masses, no organomegaly    Genitalia:   Deferred    Rectal:   Deferred    Extremities:  No cyanosis, clubbing or edema    Skin:  No jaundice, rashes, or lesions    Lymph nodes:  No palpable cervical lymphadenopathy        Lab Results:   No visits with results within 1 Day(s) from this visit.   Latest known visit with results is:   Admission on 05/26/2024, Discharged on 05/26/2024   Component Date Value   • WBC 05/26/2024 16.73 (H)    • RBC 05/26/2024 4.32    • Hemoglobin 05/26/2024 12.8    • Hematocrit 05/26/2024 40.5    • MCV 05/26/2024 94    • MCH 05/26/2024 29.6    • MCHC 05/26/2024 31.6    • RDW 05/26/2024 13.5    • MPV 05/26/2024 10.8    • Platelets 05/26/2024 205    • nRBC 05/26/2024 0    • Segmented % " 05/26/2024 81 (H)    • Immature Grans % 05/26/2024 1    • Lymphocytes % 05/26/2024 11 (L)    • Monocytes % 05/26/2024 6    • Eosinophils Relative 05/26/2024 1    • Basophils Relative 05/26/2024 0    • Absolute Neutrophils 05/26/2024 13.45 (H)    • Absolute Immature Grans 05/26/2024 0.11    • Absolute Lymphocytes 05/26/2024 1.88    • Absolute Monocytes 05/26/2024 1.05    • Eosinophils Absolute 05/26/2024 0.21    • Basophils Absolute 05/26/2024 0.03    • Sodium 05/26/2024 137    • Potassium 05/26/2024 4.6    • Chloride 05/26/2024 103    • CO2 05/26/2024 26    • ANION GAP 05/26/2024 8    • BUN 05/26/2024 24    • Creatinine 05/26/2024 1.43 (H)    • Glucose 05/26/2024 169 (H)    • Calcium 05/26/2024 8.9    • AST 05/26/2024 14    • ALT 05/26/2024 13    • Alkaline Phosphatase 05/26/2024 92    • Total Protein 05/26/2024 7.8    • Albumin 05/26/2024 3.8    • Total Bilirubin 05/26/2024 0.75    • eGFR 05/26/2024 43    • SARS-CoV-2 05/26/2024 Negative    • INFLUENZA A PCR 05/26/2024 Negative    • INFLUENZA B PCR 05/26/2024 Negative    • RSV PCR 05/26/2024 Negative    • Blood Culture 05/26/2024 No Growth After 5 Days.    • Blood Culture 05/26/2024 No Growth After 5 Days.    • hs TnI 0hr 05/26/2024 15    • LACTIC ACID 05/26/2024 1.9    • Ventricular Rate 05/26/2024 82    • Atrial Rate 05/26/2024 82    • CA Interval 05/26/2024 142    • QRSD Interval 05/26/2024 76    • QT Interval 05/26/2024 376    • QTC Interval 05/26/2024 439    • P Axis 05/26/2024 65    • QRS Axis 05/26/2024 61    • T Wave Axis 05/26/2024 66    • hs TnI 2hr 05/26/2024 10    • Delta 2hr hsTnI 05/26/2024 -5    • POC Glucose 05/26/2024 176 (H)    • POC Glucose 05/26/2024 222 (H)          Radiology Results:   No results found.

## 2024-08-02 ENCOUNTER — HOSPITAL ENCOUNTER (OUTPATIENT)
Dept: RADIOLOGY | Facility: MEDICAL CENTER | Age: 49
Discharge: HOME | End: 2024-08-02
Attending: INTERNAL MEDICINE
Payer: COMMERCIAL

## 2024-08-02 DIAGNOSIS — Z94.2 LUNG TRANSPLANT STATUS (HCC): ICD-10-CM

## 2024-08-02 PROCEDURE — 71250 CT THORAX DX C-: CPT

## 2024-08-05 ENCOUNTER — TELEPHONE (OUTPATIENT)
Dept: INTERNAL MEDICINE CLINIC | Facility: CLINIC | Age: 49
End: 2024-08-05

## 2024-08-05 NOTE — TELEPHONE ENCOUNTER
I called to reschedule her appointment for today to this afternoon or this week in order for her to see Dr. Avila, she can be placed with a Resident. Since there is a scheduled meeting for Providers today at 12pm.

## 2024-08-06 ENCOUNTER — OFFICE VISIT (OUTPATIENT)
Dept: DERMATOLOGY | Facility: CLINIC | Age: 49
End: 2024-08-06
Payer: COMMERCIAL

## 2024-08-06 VITALS — TEMPERATURE: 97.1 F

## 2024-08-06 DIAGNOSIS — D48.9 NEOPLASM OF UNCERTAIN BEHAVIOR: Primary | ICD-10-CM

## 2024-08-06 PROCEDURE — 88342 IMHCHEM/IMCYTCHM 1ST ANTB: CPT | Performed by: DERMATOLOGY

## 2024-08-06 PROCEDURE — 88341 IMHCHEM/IMCYTCHM EA ADD ANTB: CPT | Performed by: DERMATOLOGY

## 2024-08-06 PROCEDURE — 88305 TISSUE EXAM BY PATHOLOGIST: CPT | Performed by: DERMATOLOGY

## 2024-08-06 PROCEDURE — 99214 OFFICE O/P EST MOD 30 MIN: CPT | Performed by: DERMATOLOGY

## 2024-08-06 PROCEDURE — 88344 IMHCHEM/IMCYTCHM EA MLT ANTB: CPT | Performed by: DERMATOLOGY

## 2024-08-06 PROCEDURE — 11102 TANGNTL BX SKIN SINGLE LES: CPT | Performed by: DERMATOLOGY

## 2024-08-06 NOTE — PROGRESS NOTES
"[facility] Note     Patient Name: Maximilian Mann  Encounter Date: 8/6/24     Have you been cared for by a [facility] Dermatologist in the last 3 years and, if so, which description applies to you?    Yes.  I have been here within the last 3 years, and my medical history has NOT changed since that time.  I am FEMALE/of child-bearing potential.    REVIEW OF SYSTEMS:  Have you recently had or currently have any of the following? No changes in my recent health.   PAST MEDICAL HISTORY:  Have you personally ever had or currently have any of the following?  If \"YES,\" then please provide more detail. No changes in my medical history.   HISTORY OF IMMUNOSUPPRESSION: Do you have a history of any of the following:  Systemic Immunosuppression such as Diabetes, Biologic or Immunotherapy, Chemotherapy, Organ Transplantation, Bone Marrow Transplantation?  YES, type 1 diabetes, lung transplant     Answering \"YES\" requires the addition of the dotphrase \"IMMUNOSUPPRESSED\" as the first diagnosis of the patient's visit.   FAMILY HISTORY:  Any \"first degree relatives\" (parent, brother, sister, or child) with the following?    No changes in my family's known health.   PATIENT EXPERIENCE:    Do you want the Dermatologist to perform a COMPLETE skin exam today including a clinical examination under the \"bra and underwear\" areas?  Yes  If necessary, do we have your permission to call and leave a detailed message on your Preferred Phone number that includes your specific medical information?  Yes      Allergies   Allergen Reactions    Bebtelovimab Anaphylaxis    Vancomycin Angioedema and Hives    Gabapentin Hallucinations    Ceftazidime Headache     Severe headaches after desensitization    Nsaids Other (See Comments)     Lung transplant increases risk of renal toxicity, call lung txp provider to discuss if needed      Current Outpatient Medications:     acetaminophen (TYLENOL) 325 mg tablet, Take 3 tablets (975 mg " total) by mouth every 8 (eight) hours, Disp: , Rfl: 0    albuterol (2.5 mg/3 mL) 0.083 % nebulizer solution, Take 6 mL (5 mg total) by nebulization 2 (two) times a day, Disp: , Rfl: 0    albuterol (PROVENTIL HFA,VENTOLIN HFA) 90 mcg/act inhaler, INHALE 2 PUFFS EVERY 8 HOURS AS NEEDED FOR SHORTNESS OF BREATH OR WHEEZING., Disp: , Rfl:     ALPRAZolam (XANAX) 1 mg tablet, Take 1 tablet (1 mg total) by mouth daily at bedtime as needed for anxiety or sleep, Disp: 30 tablet, Rfl: 0    Ascorbic Acid (vitamin C) 100 MG tablet, Take 100 mg by mouth daily, Disp: , Rfl:     atovaquone (MEPRON) 750 mg/5 mL suspension, Take 1,500 mg by mouth daily, Disp: , Rfl:     azithromycin (ZITHROMAX) 250 mg tablet, Take 250 mg by mouth 3 (three) times a week, Disp: , Rfl:     BD Pen Needle Abi U/F 32G X 4 MM MISC, 4 times daily, Disp: 400 each, Rfl: 1    bisacodyl (DULCOLAX) 5 mg EC tablet, Take 1 tablet (5 mg total) by mouth daily at bedtime, Disp: 30 tablet, Rfl: 0    Continuous Blood Gluc  (Dexcom G7 ) SUKHI, Use 1 each continuous, Disp: 1 each, Rfl: 0    Continuous Glucose Sensor (Dexcom G7 Sensor), Use 1 Device every 10 days, Disp: 3 each, Rfl: 5    dexlansoprazole (DEXILANT) 60 MG capsule, TAKE 1 CAPSULE BY MOUTH EVERY DAY IN THE MORNING, Disp: 100 capsule, Rfl: 1    dronabinol (MARINOL) 5 MG capsule, Take 1 capsule (5 mg total) by mouth 3 (three) times a day, Disp: 90 capsule, Rfl: 0    famotidine (PEPCID) 40 MG tablet, Take 1 tablet (40 mg total) by mouth daily at bedtime, Disp: 90 tablet, Rfl: 3    insulin aspart (NovoLOG FlexPen) 100 UNIT/ML injection pen, INJECT 3 UNITS UNDER THE SKIN 3 TIMES A DAY WITH MEALS, Disp: 15 mL, Rfl: 1    Insulin Disposable Pump (Omnipod 5 G6 Intro, Gen 5,) KIT, Use 1 Units continuous, Disp: 1 kit, Rfl: 0    Insulin Disposable Pump (Omnipod 5 G6 Pods, Gen 5,) MISC, Use 1 Units every other day, Disp: 15 each, Rfl: 3    Insulin Glargine-yfgn (Semglee, yfalyssa,) 100 UNIT/ML SOPN, Inject 0.1  mL (10 Units total) under the skin every morning, Disp: 15 mL, Rfl: 0    Insulin Pen Needle (Comfort EZ Pen Needles) 33G X 4 MM MISC, Use to inject insulin 4 times a day, Disp: 100 each, Rfl: 3    multivitamin (THERAGRAN) TABS, Take 1 tablet by mouth daily, Disp: , Rfl:     naloxone (NARCAN) 4 mg/0.1 mL nasal spray, 0.1 mL (4 mg total) into each nostril every 3 (three) minutes as needed for opioid reversal or respiratory depression, Disp: 2 each, Rfl: 5    oxybutynin (DITROPAN-XL) 10 MG 24 hr tablet, Take 1 tablet (10 mg total) by mouth daily at bedtime, Disp: 90 tablet, Rfl: 3    oxyCODONE (ROXICODONE) 10 MG TABS, Take 1 to 2 tablets every 8hs as needed for severe pain. Max dose 6 tabs a day, Disp: 180 tablet, Rfl: 0    pancrelipase, Lip-Prot-Amyl, (CREON) 24,000 units, Take 48,000 Units by mouth 3 (three) times a day with meals, Disp: , Rfl:     polyethylene glycol (MIRALAX) 17 g packet, Take 17 g by mouth 2 (two) times a day, Disp: , Rfl: 0    predniSONE 10 mg tablet, Take 10 mg by mouth daily, Disp: , Rfl:     pregabalin (LYRICA) 150 mg capsule, Take 1 capsule (150 mg total) by mouth 2 times a day, Disp: 180 capsule, Rfl: 0    promethazine-dextromethorphan (PHENERGAN-DM) 6.25-15 mg/5 mL oral syrup, Take 5 mL by mouth 4 (four) times a day as needed for cough, Disp: 180 mL, Rfl: 0    Respiratory Therapy Supplies (Nebulizer) SUKHI, Please dispense nebulizer machine, Disp: , Rfl:     tacrolimus (PROGRAF) 1 mg capsule, Take 2.5 mg by mouth 2 (two) times a day Take 2 tablets and a 0.5 table to total 2.5 mg twice a day 5mg total daily, Disp: , Rfl:     Turmeric 500 MG CAPS, Take by mouth, Disp: , Rfl:     valACYclovir (VALTREX) 500 mg tablet, Take 500 mg by mouth daily, Disp: , Rfl:     vitamin B-12 (VITAMIN B-12) 1,000 mcg tablet, Take by mouth daily, Disp: , Rfl:     apixaban (ELIQUIS) 5 mg, Take 1 tablet (5 mg total) by mouth 2 (two) times a day, Disp: 30 tablet, Rfl: 0    Cholecalciferol (Vitamin D3) 125 MCG (5000  "UT) TABS, Take 5,000 Units by mouth daily, Disp: , Rfl:     doxycycline hyclate (VIBRA-TABS) 100 mg tablet, Take 100 mg by mouth 2 (two) times a day (Patient not taking: Reported on 8/1/2024), Disp: , Rfl:     nystatin (MYCOSTATIN) 500,000 units/5 mL suspension, Apply 5 mL (500,000 Units total) to the mouth or throat 4 (four) times a day (Patient not taking: Reported on 8/1/2024), Disp: 100 mL, Rfl: 0    ondansetron (ZOFRAN) 4 mg tablet, Take 4 mg by mouth (Patient not taking: Reported on 8/1/2024), Disp: , Rfl:           Whom besides the patient is providing clinical information about today's encounter?   NO ADDITIONAL HISTORIAN (patient alone provided history)    Physical Exam and Assessment/Plan by Diagnosis:    LUNG TRANSPLANT PATIENT.    HISTORY OF SQUAMOUS CELL CARCINOMA      Physical Exam:  Anatomic Location Affected:  Left upper thigh  Morphological Description of Scar:  Well healed scar  Suspected Recurrence: no  No lymphadenopathy        Additional History of Present Condition:  Lesion excised 03/16/2023.     Assessment and Plan:  Based on a thorough discussion of this condition and the management approach to it (including a comprehensive discussion of the known risks, side effects and potential benefits of treatment), the patient (family) agrees to implement the following specific plan:  Continue with 6 month skin exams  Continue to monitor skin for any changes  Use a moisturizer + sunscreen \"combo\" product such as Neutrogena Daily Defense SPF 50+ or CeraVe AM at least three times a day.     How can SCC be prevented?  The most important way to prevent SCC is to avoid sunburn. This is especially important in childhood and early life. Fair skinned individuals and those with a personal or family history of BCC should protect their skin from sun exposure daily, year-round and lifelong.  Stay indoors or under the shade in the middle of the day   Wear covering clothing   Apply high protection factor SPF50+ " broad-spectrum sunscreens generously to exposed skin if outdoors   Avoid indoor tanning (sun beds, solaria)        What is the outlook for SCC?  Most SCC are cured by treatment. Cure is most likely if treatment is undertaken when the lesion is small. A small percent of SCC's can spread to lymph  nodes and long term monitoring is indicated.   They are also at increased risk of other skin cancers, especially melanoma. Regular self-skin examinations and long-term annual skin checks by an experienced health professional are recommended.        HISTORY OF BASAL CELL CARCINOMA     Physical Exam:  Anatomic Location Affected:  Central Chest  Morphological Description of scar:  Well healed scar  Suspected Recurrence: No  Pertinent Positives:  Pertinent Negatives:        Additional History of Present Condition:  History of basal cell carcinoma with no sign of recurrence; 11/16/23 excised.      Assessment and Plan:  Based on a thorough discussion of this condition and the management approach to it (including a comprehensive discussion of the known risks, side effects and potential benefits of treatment), the patient (family) agrees to implement the following specific plan:  6 month skin checks        How can basal cell carcinoma be prevented?  The most important way to prevent BCC is to avoid sunburn. This is especially important in childhood and early life. Fair skinned individuals and those with a personal or family history of BCC should protect their skin from sun exposure daily, year-round and lifelong.  Stay indoors or under the shade in the middle of the day   Wear covering clothing   Apply high protection factor SPF50+ broad-spectrum sunscreens generously to exposed skin if outdoors   Avoid indoor tanning (sun beds, solaria)  Oral nicotinamide (vitamin B3) in a dose of 500 mg twice daily may reduce the number and severity of BCCs.     What is the outlook for basal cell carcinoma?  Most BCCs are cured by treatment. Cure  "is most likely if treatment is undertaken when the lesion is small.  About 50% of people with BCC develop a second one within 3 years of the first. They are also at increased risk of other skin cancers, especially melanoma. Regular self-skin examinations and long-term annual skin checks by an experienced health professional are recommended.       NEOPLASM OF UNCERTAIN BEHAVIOR OF SKIN    Physical Exam:  (Anatomic Location); (Size and Morphological Description); (Differential Diagnosis):  Left upper arm; 4 mm irregularly pigmented papule. Differential diagnosis: benign vs atypical nevus; rule out melanoma      Additional History of Present Condition:  Noted on exam.    Assessment and Plan:  I have discussed with the patient that a sample of skin via a \"skin biopsy” would be potentially helpful to further make a specific diagnosis under the microscope.  Based on a thorough discussion of this condition and the management approach to it (including a comprehensive discussion of the known risks, side effects and potential benefits of treatment), the patient (family) agrees to implement the following specific plan:    Procedure:  Skin Biopsy.  After a thorough discussion of treatment options and risk/benefits/alternatives (including but not limited to local pain, scarring, dyspigmentation, blistering, possible superinfection, and inability to confirm a diagnosis via histopathology), verbal and written consent were obtained and portion of the rash was biopsied for tissue sample.  See below for consent that was obtained from patient and subsequent Procedure Note.    PROCEDURE TANGENTIAL (SHAVE) BIOPSY NOTE:    Performing Physician:   Anatomic Location; Clinical Description with size (cm); Pre-Op Diagnosis:   Left upper arm; 4 mm irregularly pigmented papule. Differential diagnosis: benign vs atypical nevus; rule out melanoma  Post-op diagnosis: Same     Local anesthesia: 2% Xylocaine with epi     Topical anesthesia: " "None    Hemostasis: Aluminum chloride       After obtaining informed consent  at which time there was a discussion about the purpose of biopsy  and low risks of infection and bleeding.  The area was prepped and draped in the usual fashion. Anesthesia was obtained with 1% lidocaine with epinephrine. A shave biopsy to an appropriate sampling depth was obtained by Shave (Dermablade or 15 blade) The resulting wound was covered with surgical ointment and bandaged appropriately.     The patient tolerated the procedure well without complications and was without signs of functional compromise.      Specimen has been sent for review by Dermatopathology.    Standard post-procedure care has been explained and has been included in written form within the patient's copy of Informed Consent.    INFORMED CONSENT DISCUSSION AND POST-OPERATIVE INSTRUCTIONS FOR PATIENT    I.  RATIONALE FOR PROCEDURE  I understand that a skin biopsy allows the Dermatologist to test a lesion or rash under the microscope to obtain a diagnosis.  It usually involves numbing the area with numbing medication and removing a small piece of skin; sometimes the area will be closed with sutures. In this specific procedure, sutures are not usually needed.  If any sutures are placed, then they are usually need to be removed in 2 weeks or less.    I understand that my Dermatologist recommends that a skin \"shave\" biopsy be performed today.  A local anesthetic, similar to the kind that a dentist uses when filling a cavity, will be injected with a very small needle into the skin area to be sampled.  The injected skin and tissue underneath \"will go to sleep” and become numb so no pain should be felt afterwards.  An instrument shaped like a tiny \"razor blade\" (shave biopsy instrument) will be used to cut a small piece of tissue and skin from the area so that a sample of tissue can be taken and examined more closely under the microscope.  A slight amount of bleeding will " "occur, but it will be stopped with direct pressure and a pressure bandage and any other appropriate methods.  I understands that a scar will form where the wound was created.  Surgical ointment will be applied to help protect the wound.  Sutures are not usually needed.    II.  RISKS AND POTENTIAL COMPLICATIONS   I understand the risks and potential complications of a skin biopsy include but are not limited to the following:  Bleeding  Infection  Pain  Scar/keloid  Skin discoloration  Incomplete Removal  Recurrence  Nerve Damage/Numbness/Loss of Function  Allergic Reaction to Anesthesia  Biopsies are diagnostic procedures and based on findings additional treatment or evaluation may be required  Loss or destruction of specimen resulting in no additional findings    My Dermatologist has explained to me the nature of the condition, the nature of the procedure, and the benefits to be reasonably expected compared with alternative approaches.  My Dermatologist has discussed the likelihood of major risks or complications of this procedure including the specific risks listed above, such as bleeding, infection, and scarring/keloid.  I understand that a scar is expected after this procedure.  I understand that my physician cannot predict if the scar will form a \"keloid,\" which extends beyond the borders of the wound that is created.  A keloid is a thick, painful, and bumpy scar.  A keloid can be difficult to treat, as it does not always respond well to therapy, which includes injecting cortisone directly into the keloid every few weeks.  While this usually reduces the pain and size of the scar, it does not eliminate it.      I understand that photographs may be taken before and after the procedure.  These will be maintained as part of the medical providers confidential records and may not be made available to me.  I further authorize the medical provider to use the photographs for teaching purposes or to illustrate scientific " "papers, books, or lectures if in his/her judgment, medical research, education, or science may benefit from its use.    I have had an opportunity to fully inquire about the risks and benefits of this procedure and its alternatives.   I have been given ample time and opportunity to ask questions and to seek a second opinion if I wished to do so.  I acknowledge that there have specifically been no guarantees as to the cosmetic results from the procedure.  I am aware that with any procedure there is always the possibility of an unexpected complication.    III. POST-PROCEDURAL CARE (WHAT YOU WILL NEED TO DO \"AFTER THE BIOPSY\" TO OPTIMIZE HEALING)    Keep the area clean and dry.  Try NOT to remove the bandage or get it wet for the first 24 hours.    Gently clean the area and apply surgical ointment (such as Vaseline petrolatum ointment, which is available \"over the counter\" and not a prescription) to the biopsy site for up to 2 weeks straight.  This acts to protect the wound from the outside world.      Sutures are not usually placed in this procedure.  If any sutures were placed, return for suture removal as instructed (generally 1 week for the face, 2 weeks for the body).      Take Acetaminophen (Tylenol) for discomfort, if no contraindications.  Ibuprofen or aspirin could make bleeding worse.    Call our office immediately for signs of infection: fever, chills, increased redness, warmth, tenderness, discomfort/pain, or pus or foul smell coming from the wound.    WHAT TO DO IF THERE IS ANY BLEEDING?  If a small amount of bleeding is noticed, place a clean cloth over the area and apply firm pressure for ten minutes.  Check the wound after 10 minutes of direct pressure.  If bleeding persists, try one more time for an additional 10 minutes of direct pressure on the area.  If the bleeding becomes heavier or does not stop after the second attempt, or if you have any other questions about this procedure, then please call " your [facility] Dermatologist by calling [phone] (SKIN).     I hereby acknowledge that I have reviewed and verified the site with my Dermatologist and have requested and authorized my Dermatologist to proceed with the procedure.            SEBORRHEIC KERATOSES  - Relevant exam: Scattered over the trunk/extremities are waxy brown to black plaques and papules with stuck on appearance and consistent dermoscopy  - Exam and clinical history consistent with seborrheic keratoses  - Counseled that these are benign growths that do not require treatment    MELANOCYTIC NEVI  -Relevant exam: Scattered over the trunk/extremities are homogenously pigmented brown macules and papules. ELM performed and without concerning findings. No outliers unless otherwise noted in today's note  - Exam and clinical history consistent with melanocytic nevi  - Counseled to return to clinic prior to scheduled appointment should any of these lesions change or should any new lesions of concern arise  - Counseled on use of sun protection daily. Reviewed latest FDA sunscreen guidelines, including use of broad spectrum (UVA and UVB blocking) sunscreen or sun protective clothing with SPF 30-50 every 2-3 hours and reapplied after exposure to water    LENTIGINES  OTHER SKIN CHANGES DUE TO CHRONIC EXPOSURE TO NONIONIZING RADIATION  - Relevant exam: Over sun exposed areas are brown macules. ELM performed and without concerning findings.  - Exam and clinical history consistent with lentigines.  - Counseled to return to clinic prior to scheduled appointment should any of these lesions change or should any new lesions of concern arise.  - Recommended use of sunscreen as above and below.    CHERRY ANGIOMAS  - Relevant exam: Scattered over the trunk/extremities are red papules  - Exam and clinical history consistent with cherry angiomas  - Educated that these are benign      Scribe Attestation      I,:  Shanika Hdz MA am acting as a scribe while in the  presence of the attending physician.:       I,:  Bridgette Carrera MD personally performed the services described in this documentation    as scribed in my presence.:

## 2024-08-07 ENCOUNTER — OFFICE VISIT (OUTPATIENT)
Dept: INTERNAL MEDICINE CLINIC | Facility: CLINIC | Age: 49
End: 2024-08-07
Payer: COMMERCIAL

## 2024-08-07 VITALS
HEART RATE: 76 BPM | RESPIRATION RATE: 18 BRPM | BODY MASS INDEX: 22.45 KG/M2 | WEIGHT: 122 LBS | OXYGEN SATURATION: 95 % | TEMPERATURE: 97 F | HEIGHT: 62 IN | SYSTOLIC BLOOD PRESSURE: 128 MMHG | DIASTOLIC BLOOD PRESSURE: 78 MMHG

## 2024-08-07 DIAGNOSIS — B37.31 VAGINAL YEAST INFECTION: ICD-10-CM

## 2024-08-07 DIAGNOSIS — E84.9 CYSTIC FIBROSIS (HCC): ICD-10-CM

## 2024-08-07 DIAGNOSIS — F11.20 CONTINUOUS OPIOID DEPENDENCE (HCC): Primary | ICD-10-CM

## 2024-08-07 DIAGNOSIS — Z00.00 ENCOUNTER FOR WELLNESS EXAMINATION IN ADULT: ICD-10-CM

## 2024-08-07 DIAGNOSIS — K63.5 POLYP OF COLON, UNSPECIFIED PART OF COLON, UNSPECIFIED TYPE: ICD-10-CM

## 2024-08-07 DIAGNOSIS — Z94.2 HX OF LUNG TRANSPLANT (HCC): ICD-10-CM

## 2024-08-07 DIAGNOSIS — I10 ESSENTIAL HYPERTENSION: ICD-10-CM

## 2024-08-07 DIAGNOSIS — M85.80 OSTEOPENIA, UNSPECIFIED LOCATION: ICD-10-CM

## 2024-08-07 DIAGNOSIS — K51.00 ULCERATIVE PANCOLITIS WITHOUT COMPLICATION (HCC): ICD-10-CM

## 2024-08-07 DIAGNOSIS — E44.0 MODERATE PROTEIN-CALORIE MALNUTRITION (HCC): ICD-10-CM

## 2024-08-07 DIAGNOSIS — E10.22 TYPE 1 DIABETES MELLITUS WITH DIABETIC CHRONIC KIDNEY DISEASE, UNSPECIFIED CKD STAGE (HCC): ICD-10-CM

## 2024-08-07 DIAGNOSIS — E78.00 HYPERCHOLESTEREMIA: ICD-10-CM

## 2024-08-07 DIAGNOSIS — G89.4 CHRONIC PAIN DISORDER: ICD-10-CM

## 2024-08-07 DIAGNOSIS — R91.8 PULMONARY NODULES: ICD-10-CM

## 2024-08-07 DIAGNOSIS — K21.00 GASTROESOPHAGEAL REFLUX DISEASE WITH ESOPHAGITIS WITHOUT HEMORRHAGE: ICD-10-CM

## 2024-08-07 PROCEDURE — 99213 OFFICE O/P EST LOW 20 MIN: CPT | Performed by: INTERNAL MEDICINE

## 2024-08-07 PROCEDURE — G0439 PPPS, SUBSEQ VISIT: HCPCS | Performed by: INTERNAL MEDICINE

## 2024-08-07 RX ORDER — FLUCONAZOLE 150 MG/1
150 TABLET ORAL ONCE
Qty: 1 TABLET | Refills: 0 | Status: SHIPPED | OUTPATIENT
Start: 2024-08-07 | End: 2024-08-07

## 2024-08-07 RX ORDER — ALPRAZOLAM 1 MG/1
1 TABLET ORAL
Qty: 30 TABLET | Refills: 0 | Status: SHIPPED | OUTPATIENT
Start: 2024-08-07

## 2024-08-07 NOTE — PROGRESS NOTES
Ambulatory Visit  Name: Maximilian Mann      : 1975      MRN: [id number]  Encounter Provider: Solis Avila MD  Encounter Date: 2024   Encounter department: [facility] INTERNAL MEDICINE [place]    Assessment & Plan   1. Continuous opioid dependence (HCC)  -     [facility] Prescribed Meds and Special Instructions  -     Amphetamines, Methamphetamines  -     Butalbital  -     Phenobarbital  -     Secobarbital  -     Alprazolam  -     Clonazepam  -     Diazepam, Temazepam, Oxazepam  -     Lorazepam  -     Gabapentin  -     Pregabalin  -     Cocaine  -     Heroin  -     Buprenorphine  -     Levorphanol  -     Meperidine  -     Naltrexone  -     Fentanyl  -     Methadone  -     Oxycodone  -     Tapentadol  -     THC  -     Tramadol  -     Codeine, Hydrocodone, Hydropmorphone, Morphine  -     Bath Salts  -     Ethyl Glucuronide/Ethyl Sulfate  -     Kratom  -     Spice  -     Methylphenidate  -     Phentermine  -     Validity Oxidant  -     Validity Creatinine  -     Validity pH  -     Validity Specific  -     Xylazine Definitive Test  2. Encounter for wellness examination in adult  3. Chronic pain disorder  4. Essential hypertension  5. Moderate protein-calorie malnutrition (HCC)  Assessment & Plan:  Encouraged to optimize nutrition, encouraged adequate protein intake, consider OTC EnsureMax for protein - patient does not like these.  Alternatively can trial protein bars  6. Ulcerative pancolitis without complication (HCC)  Assessment & Plan:  She is not on medications for this issue.  She is already on immunosuppressant for lung transplant status  Ongoing f/u with GI  7. Pulmonary nodules  Assessment & Plan:  Recent CT chest noted 3 new solid pulmonary nodules measuring up to 5 mm. F/u repeat CT Chest in 12 months to ensure stability.  8. Polyp of colon, unspecified part of colon, unspecified type  Assessment & Plan:  Repeat colonoscopy in 3 years - due 28  9. Type 1 diabetes mellitus with  "diabetic chronic kidney disease, unspecified CKD stage ([facility])  Assessment & Plan:    Lab Results   Component Value Date    HGBA1C 7.6 (H) 05/27/2024     Managed by endocrinology  Continued f/u with endocrinology  10. Hypercholesteremia  Assessment & Plan:  Go for lipid panel ordered in January 2024  11. Osteopenia, unspecified location  Assessment & Plan:  Obtain 25-hydroxy vit D level ordered in January 2024  12. Hx of lung transplant ([facility])  Assessment & Plan:  Hx of  Left SLT 2017 after BLT in 2013 c/b CLAD. S/p R pneumonectomy a few years ago.  Follows closely with [facility] transplant team, very complicated chronic course.  Per review of OSH records in [place] \"More recently, Pau was hospitalized for almost 2 weeks for treatment of bacterial pneumonia that developed after rhino virus infection. Based on prior cultures showing stenotrophomonas, MRSA and Pseudomonas, she was with doxycycline and IV meropenem and a prednisone taper. Clinically she improved but has noted significant exertional dyspnea, fatigue and increased sputum production over the last few days\"  She was therefore restarted on another course of doxy and Zosyn  Currently the patient is off antibiotics, doing ok, feels closer back to baseline.  Repeat CT Chest wo contrast wo focal consolidation, did note inflammatory changes and some new pulm nodules.  Will be repeating CTA with transplant team r/o chronic VTE V/Q mismatch.  Breathing not at baseline, though improved.  + BAEZA, sats drops at times to 80s with exertion.  Patient planned for repeat PFTs in the coming weeks  Continue close follow-up and direction from her lung transplant team at [facility]        13. Cystic fibrosis (HCC)  -     ALPRAZolam (XANAX) 1 mg tablet; Take 1 tablet (1 mg total) by mouth daily at bedtime as needed for anxiety or sleep  14. Vaginal yeast infection  -     fluconazole (DIFLUCAN) 150 mg tablet; Take 1 tablet (150 mg total) by mouth once for 1 dose  15. Gastroesophageal " reflux disease with esophagitis without hemorrhage  Assessment & Plan:  S/p TIF, EGD 5/30/23 showed that fundoplication site appeared normal, no evidence of reflux esophagitis.  Continues to have worsening reflux symptoms.  Repeat EGD in April 2024 showed Irregular Z-line 40 cm from the incisors.  Mild, localized erythematous mucosa in the GE junction; performed cold forceps biopsy. + erythema distal esophagus just above GE junction biopsied.  No significant erosions or ulcerations seen.  The GE junction does appear snug.  On retroflexion the gastric wrap and fasteners seem to be in place.  The antrum, pylorus, duodenal bulb, 1st part of the duodenum and 2nd part of the duodenum appeared normal.  Sx are out of proportion to recent EGD findings, patient planned for pH testing, follows closely with GI  For now, continue Pepcid 40 mg qHS and Dexilant 60 mg daily.  Dietary and lifestyle modification, try and reduce caffeine consumption if possible.    Treatment Plan: Continue current opiate regimen as prescribed  Continue current benzodiazepine regimen as prescribed     Treatment Goals: Reduction of morbidity associated with chronic pain disorder  Balance benefits vs risks of treatment  Controlled substance agreement reviewed and signed today with patient  Will maintain q3 month follow-ups  [facility] drug screen obtained today per protocol    Opiate risks  There are risks associated with opioid medications, including dependence, addiction and tolerance. The patient understands and agrees to use these medications only as prescribed. Potential side effects of the medications include, but are not limited to, constipation, drowsiness, addiction, impaired judgment and risk of fatal overdose if not taken as prescribed. The patient was warned against driving while taking sedation medications.  Sharing medications is a felony. At this point in time, the patient is showing no signs of addiction, abuse, diversion or suicidal  ideation.      Pateint is taking concurrent benzodiazepines. Has been counseled on the risks of taking opioids and benzodiazepines including sedation, increased fall risk, dizziness, addictive potential and death.      Patient has a high risk condition (age > 65, MICHAEL, renal or hepatic impairment, mental health condition, use of alcohol or other substances). Has been counseled on the specific risks of taking opioids with these conditions and the increased risks including including sedation, increased fall risk, dizziness, addictive potential and death.      Opioid agreement  Pain management agreement was reviewed with patient and signed/updated during visit      PDMP review  [place] or [place]  reviewed. No red flags were identified; safe to proceed with prescription      risks and benefits of treatment options, instructions for management, patient and family education, importance of treatment compliance, risk factor reductions, counseling/coordination of care, documenting in the medical record, reviewing/ordering tests, medicine, procedures and obtaining or reviewing history.           History of Present Illness         Pain related diagnoses: Chronic pain syndrome/ lung transplant patient    Social Support System  Patient receives support from their:  and significant other    Screening Tools/Assessments:    PHQ-2/9:  PHQ-2 score: 2    Brief Pain Inventory (BPI):  1) Throughout our lives, most of us have had pain from time to time (such as minor headaches, sprains, and toothaches). Have you had pain other than these everyday kinds of pain today? Yes  2) Where is your pain located? rib and muscles  3) Rate your pain at its worst in the last 24 hours: 4  4) Rate your pain at its least in the last 24 hours: 4  5) Rate your average level of pain: 5  6) Rate your pain right now: 3  7) What treatments or medications are you receiving for your pain?  8) In the past 24 hours, how much relief have pain treatments or  "medication provided? 70%  9) During the past 24 hours, pain has interfered with your:     A) General activity: 1     B) Mood: 2     C) Walking ability: 1     D) Normal work (work outside the home & housework): 4     E) Relations with other people: 1     F) Sleep: 5     G) Enjoyment of life: 1    Drug Screen:  Last drug screen was performed more than 365 days ago    Opioid agreement:  Active Opioid agreement on file?: Yes    Opioid agreement signed date: 8/7/2024  Opioid agreement expiration date: 8/7/2025    Naloxone:  Currently prescribed Naloxone (Narcan): Yes      Other treatments tried/failed:   Acetaminophen, topical patches (OTC), muscle relaxants, narcotic analgesics, other, prescription NSAIDs and rest    Outpatient Morphine Milligram Equivalents Per Day     8/7/24 and after Unknown    Order Name Dose Route Frequency Maximum MME/Day     oxyCODONE (ROXICODONE) 10 MG TABS     Unknown    Total Potential Morphine Milligram Equivalents Per Day Unknown    An error was encountered while attempting to calculate the morphine milligram equivalents per day.     Calculation Information        oxyCODONE (ROXICODONE) 10 MG TABS    Not enough information to calculate morphine milligram equivalents per day.                         PDMP Review       Value Time User    PDMP Reviewed  Yes 8/7/2024 12:01 PM Solis Avila MD         Review of Systems   Constitutional:  Positive for activity change and fatigue. Negative for chills and fever.   Respiratory:  Positive for cough and shortness of breath. Negative for wheezing.    Cardiovascular:  Negative for palpitations and leg swelling.     Objective     /78 (BP Location: Left arm, Patient Position: Sitting, Cuff Size: Adult)   Pulse 76   Temp (!) 97 °F (36.1 °C) (Tympanic)   Resp 18   Ht 5' 2\" (1.575 m)   Wt 55.3 kg (122 lb)   SpO2 95%   BMI 22.31 kg/m²     Physical Exam  Vitals reviewed.   Constitutional:       General: She is not in acute distress.     Appearance: " She is ill-appearing. She is not toxic-appearing.   Cardiovascular:      Rate and Rhythm: Normal rate and regular rhythm.   Pulmonary:      Effort: No respiratory distress.      Breath sounds: No wheezing, rhonchi or rales.   Abdominal:      General: There is no distension.      Palpations: There is no mass.      Tenderness: There is no abdominal tenderness. There is no guarding.   Musculoskeletal:         General: No swelling.   Skin:     General: Skin is warm and dry.   Neurological:      Mental Status: Mental status is at baseline.       Administrative Statements

## 2024-08-07 NOTE — ASSESSMENT & PLAN NOTE
Encouraged to optimize nutrition, encouraged adequate protein intake, consider OTC EnsureMax for protein - patient does not like these.  Alternatively can trial protein bars

## 2024-08-07 NOTE — PATIENT INSTRUCTIONS
Goals of care:  Maximize your health and quality of life by:   Increasing your level of function and activity  Decreasing the negative effects of pain on your life  Minimizing the risks and side effects of medications and ensuring safe use of opioid medication     Ways for you to help meet your goals:  Maintain a healthy lifestyle. This includes proper nutrition, regular physical activity as able, try for 8 hours of sleep per night, use stress reduction strategies, avoid triggers.      Risks and side effects of opioid use:  Prescription opioids carry serious risks of addiction and  overdose, especially with prolonged use. An opioid overdose,  often marked by slowed breathing, can cause sudden death. The  use of prescription opioids can have a number of side effects as  well, even when taken as directed:  Tolerance--meaning you might need to take more of a medication for the same pain relief  Physical dependence--meaning you have symptoms of withdrawal when a medication is stopped  Increased sensitivity to pain  Constipation  Nausea, vomiting, dry mouth  Sleepiness and dizziness   Confusion  Depression  Low levels of testosterone that can result in lower sex drive, energy, and strength  Itching and sweating    If you are prescribed opioids for pain:  Never take opioids in greater amounts or more often than prescribed.  Help prevent misuse and abuse.        - Never sell or share prescription opioids.        - Never use another person’s prescription opioids.  ‡Store prescription opioids in a secure place and out of reach of others (this may include visitors, children, friends, and family).  Safely dispose of unused prescription opioids: Find your community drug take-back program or your pharmacy mail-back program, or flush them down the toilet, following guidance from the Food and Drug Administration (www.fda.gov/Drugs/ResourcesForYou).  ‡Visit www.cdc.gov/drugoverdose to learn about the risks of opioid abuse and  overdose.  If you believe you may be struggling with addiction, tell your health care provider and ask for guidance or call [facility]’s National Helpline at 5-547-620-YZND.

## 2024-08-07 NOTE — ASSESSMENT & PLAN NOTE
"Hx of  Left SLT 2017 after BLT in 2013 c/b CLAD. S/p R pneumonectomy a few years ago.  Follows closely with [facility] transplant team, very complicated chronic course.  Per review of OSH records in [place] \"More recently, Pau was hospitalized for almost 2 weeks for treatment of bacterial pneumonia that developed after rhino virus infection. Based on prior cultures showing stenotrophomonas, MRSA and Pseudomonas, she was with doxycycline and IV meropenem and a prednisone taper. Clinically she improved but has noted significant exertional dyspnea, fatigue and increased sputum production over the last few days\"  She was therefore restarted on another course of doxy and Zosyn  Currently the patient is off antibiotics, doing ok, feels closer back to baseline.  Repeat CT Chest wo contrast wo focal consolidation, did note inflammatory changes and some new pulm nodules.  Will be repeating CTA with transplant team r/o chronic VTE V/Q mismatch.  Breathing not at baseline, though improved.  + BAEZA, sats drops at times to 80s with exertion.  Patient planned for repeat PFTs in the coming weeks  Continue close follow-up and direction from her lung transplant team at [facility]        "

## 2024-08-07 NOTE — ASSESSMENT & PLAN NOTE
Recent CT chest noted 3 new solid pulmonary nodules measuring up to 5 mm. F/u repeat CT Chest in 12 months to ensure stability.

## 2024-08-07 NOTE — PROGRESS NOTES
Ambulatory Visit  Name: Maximilian Mann      : 1975      MRN: [id number]  Encounter Provider: Solis Avila MD  Encounter Date: 2024   Encounter department: [facility] INTERNAL MEDICINE [place]    Assessment & Plan   1. Continuous opioid dependence (HCC)  -     [facility] Prescribed Meds and Special Instructions  -     Amphetamines, Methamphetamines  -     Butalbital  -     Phenobarbital  -     Secobarbital  -     Alprazolam  -     Clonazepam  -     Diazepam, Temazepam, Oxazepam  -     Lorazepam  -     Gabapentin  -     Pregabalin  -     Cocaine  -     Heroin  -     Buprenorphine  -     Levorphanol  -     Meperidine  -     Naltrexone  -     Fentanyl  -     Methadone  -     Oxycodone  -     Tapentadol  -     THC  -     Tramadol  -     Codeine, Hydrocodone, Hydropmorphone, Morphine  -     Bath Salts  -     Ethyl Glucuronide/Ethyl Sulfate  -     Kratom  -     Spice  -     Methylphenidate  -     Phentermine  -     Validity Oxidant  -     Validity Creatinine  -     Validity pH  -     Validity Specific  -     Xylazine Definitive Test  2. Encounter for wellness examination in adult  3. Chronic pain disorder  4. Essential hypertension  5. Moderate protein-calorie malnutrition (HCC)  Assessment & Plan:  Encouraged to optimize nutrition, encouraged adequate protein intake, consider OTC EnsureMax for protein.  6. Ulcerative pancolitis without complication (HCC)  Assessment & Plan:  She is not on medications for this issue.  She is already on immunosuppressant for lung transplant status  Ongoing f/u with GI  7. Pulmonary nodules  Assessment & Plan:  Recent CT chest noted 3 new solid pulmonary nodules measuring up to 5 mm. F/u repeat CT Chest in 12 months to ensure stability.  8. Polyp of colon, unspecified part of colon, unspecified type  Assessment & Plan:  Repeat colonoscopy in 3 years - due 28  9. Type 1 diabetes mellitus with diabetic chronic kidney disease, unspecified CKD stage  "([facility])  Assessment & Plan:    Lab Results   Component Value Date    HGBA1C 7.6 (H) 05/27/2024     Managed by endocrinology  Continued f/u with endocrinology  10. Hypercholesteremia  Assessment & Plan:  Go for lipid panel ordered in January 2024  11. Osteopenia, unspecified location  Assessment & Plan:  Obtain 25-hydroxy vit D level ordered in January 2024  12. Hx of lung transplant ([facility])  Assessment & Plan:  Hx of  Left SLT 2017 after BLT in 2013 c/b CLAD. S/p R pneumonectomy a few years ago.  Follows closely with [facility] transplant team, very complicated chronic course.  Per review of OSH records in [place] \"More recently, Pau was hospitalized for almost 2 weeks for treatment of bacterial pneumonia that developed after rhino virus infection. Based on prior cultures showing stenotrophomonas, MRSA and Pseudomonas, she was with doxycycline and IV meropenem and a prednisone taper. Clinically she improved but has noted significant exertional dyspnea, fatigue and increased sputum production over the last few days\"  She was therefore restarted on another course of doxy and Zosyn  Currently the patient is              Preventive health issues were discussed with patient, and age appropriate screening tests were ordered as noted in patient's After Visit Summary. Personalized health advice and appropriate referrals for health education or preventive services given if needed, as noted in patient's After Visit Summary.    History of Present Illness     HPI   Patient Care Team:  Solis Avila MD as PCP - General (Internal Medicine)    Review of Systems  Medical History Reviewed by provider this encounter:       Annual Wellness Visit Questionnaire       Health Risk Assessment:   Patient rates overall health as fair. Patient feels that their physical health rating is slightly worse. Patient is satisfied with their life. Eyesight was rated as same. Hearing was rated as same. Patient feels that their emotional and mental health " rating is same. Patients states they are sometimes angry. Patient states they are often unusually tired/fatigued. Pain experienced in the last 7 days has been some. Patient's pain rating has been 5/10. Patient states that she has experienced no weight loss or gain in last 6 months.     Depression Screening:   PHQ-2 Score: 2      Fall Risk Screening:   In the past year, patient has experienced: history of falling in past year    Number of falls: 1  Injured during fall?: No    Feels unsteady when standing or walking?: No    Worried about falling?: No      Urinary Incontinence Screening:   Patient has leaked urine accidently in the last six months.     Home Safety:  Patient has trouble with stairs inside or outside of their home. Patient has working smoke alarms and has working carbon monoxide detector. Home safety hazards include: none.     Nutrition:   Current diet is Regular.     Medications:   Patient is not currently taking any over-the-counter supplements. Patient is able to manage medications.     Activities of Daily Living (ADLs)/Instrumental Activities of Daily Living (IADLs):   Walk and transfer into and out of bed and chair?: Yes  Dress and groom yourself?: Yes    Bathe or shower yourself?: Yes    Feed yourself? Yes  Do your laundry/housekeeping?: Yes  Manage your money, pay your bills and track your expenses?: Yes  Make your own meals?: Yes    Do your own shopping?: Yes    PREVENTIVE SCREENINGS      Cardiovascular Screening:    General: Screening Not Indicated and History Lipid Disorder      Diabetes Screening:     General: Screening Not Indicated and History Diabetes      Colorectal Cancer Screening:     General: Screening Current      Breast Cancer Screening:     General: Screening Current      Osteoporosis Screening:    General: Screening Not Indicated and History Osteoporosis      Lung Cancer Screening:     General: Screening Not Indicated    Screening, Brief Intervention, and Referral to Treatment  (SBIRT)    Screening      AUDIT-C Screenin) How often did you have a drink containing alcohol in the past year? never  2) How many drinks did you have on a typical day when you were drinking in the past year? 0  3) How often did you have 6 or more drinks on one occasion in the past year? never    AUDIT-C Score: 0  Interpretation: Score 0-2 (female): Negative screen for alcohol misuse    Single Item Drug Screening:  How often have you used an illegal drug (including marijuana) or a prescription medication for non-medical reasons in the past year? never    Single Item Drug Screen Score: 0  Interpretation: Negative screen for possible drug use disorder    Review of Current Opioid Use  Opioid Risk Tool (ORT) Score: 6  Opioid Risk Tool (ORT) Interpretation: Score 4-7: Moderate risk for opioid misuse    Social Determinants of Health     Financial Resource Strain: Low Risk  (11/10/2022)    [facility]    Overall Financial Resource Strain (CARDIA)    • Difficulty of Paying Living Expenses: Not hard at all   Food Insecurity: No Food Insecurity (2024)    Hunger Vital Sign    • Worried About Running Out of Food in the Last Year: Never true    • Ran Out of Food in the Last Year: Never true   Transportation Needs: No Transportation Needs (2024)    PRAPARE - Transportation    • Lack of Transportation (Medical): No    • Lack of Transportation (Non-Medical): No   Housing Stability: Low Risk  (2024)    Housing Stability Vital Sign    • Unable to Pay for Housing in the Last Year: No    • Number of Times Moved in the Last Year: 0    • Homeless in the Last Year: No   Utilities: Not At Risk (2024)    [facility] Utilities    • Threatened with loss of utilities: No     No results found.    Objective     /78 (BP Location: Left arm, Patient Position: Sitting, Cuff Size: Adult)   Pulse 76   Temp (!) 97 °F (36.1 °C) (Tympanic)   Resp  "18   Ht 5' 2\" (1.575 m)   Wt 55.3 kg (122 lb)   SpO2 95%   BMI 22.31 kg/m²     Physical Exam      "

## 2024-08-07 NOTE — ASSESSMENT & PLAN NOTE
She is not on medications for this issue.  She is already on immunosuppressant for lung transplant status  Ongoing f/u with GI

## 2024-08-07 NOTE — ASSESSMENT & PLAN NOTE
S/p TIF, EGD 5/30/23 showed that fundoplication site appeared normal, no evidence of reflux esophagitis.  Continues to have worsening reflux symptoms.  Repeat EGD in April 2024 showed Irregular Z-line 40 cm from the incisors.  Mild, localized erythematous mucosa in the GE junction; performed cold forceps biopsy. + erythema distal esophagus just above GE junction biopsied.  No significant erosions or ulcerations seen.  The GE junction does appear snug.  On retroflexion the gastric wrap and fasteners seem to be in place.  The antrum, pylorus, duodenal bulb, 1st part of the duodenum and 2nd part of the duodenum appeared normal.  Sx are out of proportion to recent EGD findings, patient planned for pH testing, follows closely with GI  For now, continue Pepcid 40 mg qHS and Dexilant 60 mg daily.  Dietary and lifestyle modification, try and reduce caffeine consumption if possible.

## 2024-08-07 NOTE — ASSESSMENT & PLAN NOTE
Lab Results   Component Value Date    HGBA1C 7.6 (H) 05/27/2024     Managed by endocrinology  Continued f/u with endocrinology

## 2024-08-09 LAB
4OH-XYLAZINE UR QL CFM: NEGATIVE NG/ML
6MAM UR QL CFM: NEGATIVE NG/ML
7AMINOCLONAZEPAM UR QL CFM: NEGATIVE NG/ML
A-OH ALPRAZ UR QL CFM: NORMAL NG/ML
ACCEPTABLE CREAT UR QL: NORMAL MG/DL
ACCEPTIBLE SP GR UR QL: NORMAL
AMPHET UR QL CFM: NEGATIVE NG/ML
BUPRENORPHINE UR QL CFM: NEGATIVE NG/ML
BUTALBITAL UR QL CFM: NEGATIVE NG/ML
BZE UR QL CFM: NEGATIVE NG/ML
CODEINE UR QL CFM: NEGATIVE NG/ML
EDDP UR QL CFM: NEGATIVE NG/ML
ETHYL GLUCURONIDE UR QL CFM: NEGATIVE NG/ML
ETHYL SULFATE UR QL SCN: NEGATIVE NG/ML
EUTYLONE UR QL: NEGATIVE NG/ML
FENTANYL UR QL CFM: NEGATIVE NG/ML
GLIADIN IGG SER IA-ACNC: NEGATIVE NG/ML
HYDROCODONE UR QL CFM: NEGATIVE NG/ML
HYDROMORPHONE UR QL CFM: NEGATIVE NG/ML
LORAZEPAM UR QL CFM: NEGATIVE NG/ML
ME-PHENIDATE UR QL CFM: NEGATIVE NG/ML
MEPERIDINE UR QL CFM: NEGATIVE NG/ML
METHADONE UR QL CFM: NEGATIVE NG/ML
METHAMPHET UR QL CFM: NEGATIVE NG/ML
MORPHINE UR QL CFM: NEGATIVE NG/ML
NALTREXONE UR QL CFM: NEGATIVE NG/ML
NITRITE UR QL: NORMAL UG/ML
NORBUPRENORPHINE UR QL CFM: NEGATIVE NG/ML
NORDIAZEPAM UR QL CFM: NEGATIVE NG/ML
NORFENTANYL UR QL CFM: NEGATIVE NG/ML
NORHYDROCODONE UR QL CFM: NEGATIVE NG/ML
NORMEPERIDINE UR QL CFM: NEGATIVE NG/ML
NOROXYCODONE UR QL CFM: NORMAL NG/ML
OXAZEPAM UR QL CFM: NEGATIVE NG/ML
OXYCODONE UR QL CFM: NORMAL NG/ML
OXYMORPHONE UR QL CFM: NORMAL NG/ML
PARA-FLUOROFENTANYL QUANTIFICATION: NORMAL NG/ML
PHENOBARB UR QL CFM: NEGATIVE NG/ML
RESULT ALL_PRESCRIBED MEDS AND SPECIAL INSTRUCTIONS: NORMAL
SECOBARBITAL UR QL CFM: NEGATIVE NG/ML
SL AMB 4-ANPP QUANTIFICATION: NORMAL NG/ML
SL AMB 5F-ADB-M7 METABOLITE QUANTIFICATION: NEGATIVE NG/ML
SL AMB 7-OH-MITRAGYNINE (KRATOM ALKALOID) QUANTIFICATION: NEGATIVE NG/ML
SL AMB AB-FUBINACA-M3 METABOLITE QUANTIFICATION: NEGATIVE NG/ML
SL AMB ACETYL FENTANYL QUANTIFICATION: NORMAL NG/ML
SL AMB ACETYL NORFENTANYL QUANTIFICATION: NORMAL NG/ML
SL AMB ACRYL FENTANYL QUANTIFICATION: NORMAL NG/ML
SL AMB CARFENTANIL QUANTIFICATION: NORMAL NG/ML
SL AMB CTHC (MARIJUANA METABOLITE) QUANTIFICATION: NEGATIVE NG/ML
SL AMB DEXTRORPHAN (DEXTROMETHORPHAN METABOLITE) QUANT: ABNORMAL NG/ML
SL AMB GABAPENTIN QUANTIFICATION: NEGATIVE NG/ML
SL AMB JWH018 METABOLITE QUANTIFICATION: NEGATIVE NG/ML
SL AMB JWH073 METABOLITE QUANTIFICATION: NEGATIVE NG/ML
SL AMB MDMB-FUBINACA-M1 METABOLITE QUANTIFICATION: NEGATIVE NG/ML
SL AMB METHYLONE QUANTIFICATION: NEGATIVE NG/ML
SL AMB N-DESMETHYL-TRAMADOL QUANTIFICATION: NEGATIVE NG/ML
SL AMB PHENTERMINE QUANTIFICATION: NEGATIVE NG/ML
SL AMB PREGABALIN QUANTIFICATION: NORMAL NG/ML
SL AMB RCS4 METABOLITE QUANTIFICATION: NEGATIVE NG/ML
SL AMB RITALINIC ACID QUANTIFICATION: NEGATIVE NG/ML
SMOOTH MUSCLE AB TITR SER IF: NEGATIVE NG/ML
SPECIMEN DRAWN SERPL: NEGATIVE NG/ML
SPECIMEN PH ACCEPTABLE UR: NORMAL
TAPENTADOL UR QL CFM: NEGATIVE NG/ML
TEMAZEPAM UR QL CFM: NEGATIVE NG/ML
TRAMADOL UR QL CFM: NEGATIVE NG/ML
URATE/CREAT 24H UR: NEGATIVE NG/ML
XYLAZINE UR QL CFM: NEGATIVE NG/ML

## 2024-08-12 PROCEDURE — 88341 IMHCHEM/IMCYTCHM EA ADD ANTB: CPT | Performed by: DERMATOLOGY

## 2024-08-12 PROCEDURE — 88344 IMHCHEM/IMCYTCHM EA MLT ANTB: CPT | Performed by: DERMATOLOGY

## 2024-08-12 PROCEDURE — 88305 TISSUE EXAM BY PATHOLOGIST: CPT | Performed by: DERMATOLOGY

## 2024-08-12 PROCEDURE — 88342 IMHCHEM/IMCYTCHM 1ST ANTB: CPT | Performed by: DERMATOLOGY

## 2024-08-12 NOTE — RESULT ENCOUNTER NOTE
DERMATOPATHOLOGY RESULT NOTE    Results reviewed by ordering physician.  Called patient to personally discuss results. No answer, left voicemail with result.      Instructions for Clinical Derm Team:   (remember to route Result Note to appropriate staff):    None    Result & Plan by Specimen:    Specimen A: benign  Plan: monitor, reassured, benign, and discussed possibility of recurrence      0 Result Notes     Component   Case Report  Surgical Pathology Report                         Case: [id number]                                  Authorizing Provider:  Bridgette Carrera MD     Collected:           08/06/2024 [facility]              Ordering Location:     [facility]      Received:            08/06/2024 [address] [place] [address]                                                                Pathologist:           Bridgette Carrera MD                                                      Specimen:    Skin, Other, A. left upper arm                                                          Final Diagnosis  A. Skin, left upper arm, shave biopsy:  Intradermal melanocytic nevus; transected.    Comment: SOX10 fails to demonstrate significant pagetoid scatter or confluent junctional growth of melanocytes. HMB45 demonstrates top-heavy gradient staining consistent with normal melanocyte maturation with dermal descent. A ki67/MelanA dual stain highlights a low proliferative index of dermal melanocytes.      Electronically signed by Bridgette Carrera MD on 8/12/2024 at 11:45 AM

## 2024-08-26 ENCOUNTER — TREATMENT (OUTPATIENT)
Dept: ENDOCRINOLOGY | Facility: CLINIC | Age: 49
End: 2024-08-26
Payer: COMMERCIAL

## 2024-08-26 DIAGNOSIS — E10.22 TYPE 1 DIABETES MELLITUS WITH DIABETIC CHRONIC KIDNEY DISEASE, UNSPECIFIED CKD STAGE (HCC): Primary | ICD-10-CM

## 2024-08-26 PROCEDURE — 95251 CONT GLUC MNTR ANALYSIS I&R: CPT | Performed by: INTERNAL MEDICINE

## 2024-08-26 RX ORDER — INSULIN GLARGINE-YFGN 100 [IU]/ML
12 INJECTION, SOLUTION SUBCUTANEOUS EVERY MORNING
Qty: 15 ML | Refills: 1 | Status: SHIPPED | OUTPATIENT
Start: 2024-08-26

## 2024-08-26 RX ORDER — INSULIN ASPART 100 [IU]/ML
INJECTION, SOLUTION INTRAVENOUS; SUBCUTANEOUS
Qty: 36 ML | Refills: 1 | Status: SHIPPED | OUTPATIENT
Start: 2024-08-26

## 2024-08-26 NOTE — PROGRESS NOTES
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  Last visit was 2/8/23.     Reported high dose prednisone therapy with taper for treatment of rejection.    CGM data review::  Device: dexcom G7 Dates: 8/19/24-8/26/24  Usage: 98.9 % Av glu: 191 mg/dL  SD:  mg/dL CV: 57.1 % GMI: 7.9 %  TIR: 43 %  TAR: 17+30 %  TBR: 7+3 %    Glycemic patters:  labile glycemia with severe postprandial hyperglycemia and occasional fasting and pp hypoglycemia.    Recommendation:  Humalog Insulin 10u 8u 12u     Regular, Apidra, Humalog orNovolog Sliding Scale:   <80                      151-200 + 2 +2 +2     201-250 +4 +4 +4 +   251-300 +5 +5 +5 +   351-400 +6 +6 +6 +   >401 +7 +7 +7 +      Lantus Insulin  12u           Download dexcom data weekly and titrate insulin therapy accordingly.

## 2024-08-30 DIAGNOSIS — R35.0 URINE FREQUENCY: Primary | ICD-10-CM

## 2024-08-30 RX ORDER — OXYBUTYNIN CHLORIDE 15 MG/1
15 TABLET, EXTENDED RELEASE ORAL
Qty: 90 TABLET | Refills: 3 | Status: SHIPPED | OUTPATIENT
Start: 2024-08-30

## 2024-09-03 DIAGNOSIS — R35.0 URINE FREQUENCY: ICD-10-CM

## 2024-09-03 DIAGNOSIS — E84.9 CYSTIC FIBROSIS (HCC): ICD-10-CM

## 2024-09-03 RX ORDER — OXYCODONE HYDROCHLORIDE 10 MG/1
TABLET ORAL
Qty: 180 TABLET | Refills: 0 | Status: SHIPPED | OUTPATIENT
Start: 2024-09-03

## 2024-09-03 RX ORDER — OXYBUTYNIN CHLORIDE 15 MG/1
15 TABLET, EXTENDED RELEASE ORAL
Qty: 90 TABLET | Refills: 3 | Status: SHIPPED | OUTPATIENT
Start: 2024-09-03

## 2024-09-05 ENCOUNTER — TREATMENT (OUTPATIENT)
Dept: OTHER | Facility: HOSPITAL | Age: 49
End: 2024-09-05

## 2024-09-05 DIAGNOSIS — E10.65 TYPE 1 DIABETES MELLITUS WITH HYPERGLYCEMIA (HCC): Primary | ICD-10-CM

## 2024-09-05 NOTE — PROGRESS NOTES
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  Last visit was 2/8/23.      Reported high dose prednisone therapy with taper for treatment of rejection.     CGM data review::  Device: dexcom G7    Dates: 8/23/24-9/5/24                       Usage: 98.9 %            Av glu: 235 mg/dL                  SD: 117 mg/dL      CV: 57.1 %      GMI: 8.9 %  TIR: 33 %                    TAR: 16+45 %             TBR: 3+3 %     Glycemic patters: labile glycemia with severe postprandial hyperglycemia and occasional fasting and pp hypoglycemia mostly around post lunch.     Recommendation:  I called and left a message.  Humalog Insulin 10u 6u 12u     Regular, Apidra, Humalog orNovolog Sliding Scale:   <80                      151-200 + 1 +1 +1     201-250 +2 +2 +2 +   251-300 +3 +3 +3 +   351-400 +4 +4 +4 +   >401 +5 +5 +5 +      Lantus Insulin  12u

## 2024-09-06 PROBLEM — Z00.00 ENCOUNTER FOR WELLNESS EXAMINATION IN ADULT: Status: RESOLVED | Noted: 2023-06-05 | Resolved: 2024-09-06

## 2024-09-12 ENCOUNTER — PATIENT MESSAGE (OUTPATIENT)
Dept: INTERNAL MEDICINE CLINIC | Facility: CLINIC | Age: 49
End: 2024-09-12

## 2024-09-12 DIAGNOSIS — E84.9 CYSTIC FIBROSIS (HCC): ICD-10-CM

## 2024-09-13 RX ORDER — ALPRAZOLAM 1 MG
1 TABLET ORAL
Qty: 30 TABLET | Refills: 0 | Status: ON HOLD | OUTPATIENT
Start: 2024-09-13

## 2024-09-19 DIAGNOSIS — E10.22 TYPE 1 DIABETES MELLITUS WITH DIABETIC CHRONIC KIDNEY DISEASE, UNSPECIFIED CKD STAGE (HCC): ICD-10-CM

## 2024-09-19 RX ORDER — INSULIN ASPART 100 [IU]/ML
INJECTION, SOLUTION INTRAVENOUS; SUBCUTANEOUS
Qty: 45 ML | Refills: 1 | Status: SHIPPED | OUTPATIENT
Start: 2024-09-19

## 2024-09-20 ENCOUNTER — TELEPHONE (OUTPATIENT)
Dept: UROLOGY | Facility: CLINIC | Age: 49
End: 2024-09-20

## 2024-09-20 ENCOUNTER — ANESTHESIA EVENT (OUTPATIENT)
Dept: PERIOP | Facility: HOSPITAL | Age: 49
DRG: 660 | End: 2024-09-20
Payer: COMMERCIAL

## 2024-09-20 ENCOUNTER — APPOINTMENT (EMERGENCY)
Dept: CT IMAGING | Facility: HOSPITAL | Age: 49
End: 2024-09-20
Payer: COMMERCIAL

## 2024-09-20 ENCOUNTER — APPOINTMENT (OUTPATIENT)
Dept: RADIOLOGY | Facility: HOSPITAL | Age: 49
DRG: 660 | End: 2024-09-20
Payer: COMMERCIAL

## 2024-09-20 ENCOUNTER — HOSPITAL ENCOUNTER (EMERGENCY)
Facility: HOSPITAL | Age: 49
End: 2024-09-20
Attending: EMERGENCY MEDICINE
Payer: COMMERCIAL

## 2024-09-20 ENCOUNTER — ANESTHESIA (OUTPATIENT)
Dept: PERIOP | Facility: HOSPITAL | Age: 49
DRG: 660 | End: 2024-09-20
Payer: COMMERCIAL

## 2024-09-20 ENCOUNTER — HOSPITAL ENCOUNTER (INPATIENT)
Facility: HOSPITAL | Age: 49
LOS: 6 days | Discharge: HOME | DRG: 660 | End: 2024-09-26
Attending: UROLOGY | Admitting: INTERNAL MEDICINE
Payer: COMMERCIAL

## 2024-09-20 VITALS
DIASTOLIC BLOOD PRESSURE: 67 MMHG | OXYGEN SATURATION: 92 % | HEART RATE: 70 BPM | TEMPERATURE: 98.9 F | SYSTOLIC BLOOD PRESSURE: 105 MMHG | RESPIRATION RATE: 18 BRPM

## 2024-09-20 DIAGNOSIS — N20.0 NEPHROLITHIASIS: ICD-10-CM

## 2024-09-20 DIAGNOSIS — N20.0 NEPHROLITHIASIS: Primary | ICD-10-CM

## 2024-09-20 DIAGNOSIS — Z94.2 HX OF LUNG TRANSPLANT (HCC): Primary | ICD-10-CM

## 2024-09-20 DIAGNOSIS — Z87.442 HISTORY OF NEPHROLITHIASIS: Primary | ICD-10-CM

## 2024-09-20 PROBLEM — Z86.718 HISTORY OF DVT (DEEP VEIN THROMBOSIS): Chronic | Status: ACTIVE | Noted: 2024-09-20

## 2024-09-20 PROBLEM — J02.9 SORE THROAT: Status: ACTIVE | Noted: 2024-09-20

## 2024-09-20 PROBLEM — Z86.711 HISTORY OF PULMONARY EMBOLUS (PE): Chronic | Status: ACTIVE | Noted: 2024-09-20

## 2024-09-20 LAB
ALBUMIN SERPL BCG-MCNC: 3.8 G/DL (ref 3.5–5)
ALP SERPL-CCNC: 74 U/L (ref 34–104)
ALT SERPL W P-5'-P-CCNC: 18 U/L (ref 7–52)
ANION GAP SERPL CALCULATED.3IONS-SCNC: 6 MMOL/L (ref 4–13)
AST SERPL W P-5'-P-CCNC: 11 U/L (ref 13–39)
BACTERIA UR QL AUTO: ABNORMAL /HPF
BASOPHILS # BLD AUTO: 0.02 THOUSANDS/ΜL (ref 0–0.1)
BASOPHILS NFR BLD AUTO: 0 % (ref 0–1)
BILIRUB SERPL-MCNC: 0.62 MG/DL (ref 0.2–1)
BILIRUB UR QL STRIP: NEGATIVE
BUN SERPL-MCNC: 31 MG/DL (ref 5–25)
CALCIUM SERPL-MCNC: 8.9 MG/DL (ref 8.4–10.2)
CHLORIDE SERPL-SCNC: 97 MMOL/L (ref 96–108)
CLARITY UR: CLEAR
CO2 SERPL-SCNC: 33 MMOL/L (ref 21–32)
COLOR UR: ABNORMAL
CREAT SERPL-MCNC: 1.21 MG/DL (ref 0.6–1.3)
EOSINOPHIL # BLD AUTO: 0.07 THOUSAND/ΜL (ref 0–0.61)
EOSINOPHIL NFR BLD AUTO: 1 % (ref 0–6)
ERYTHROCYTE [DISTWIDTH] IN BLOOD BY AUTOMATED COUNT: 14.4 % (ref 11.6–15.1)
EXT PREGNANCY TEST URINE: NEGATIVE
EXT. CONTROL: NORMAL
GFR SERPL CREATININE-BSD FRML MDRD: 53 ML/MIN/1.73SQ M
GLUCOSE SERPL-MCNC: 144 MG/DL (ref 65–140)
GLUCOSE SERPL-MCNC: 146 MG/DL (ref 65–140)
GLUCOSE SERPL-MCNC: 67 MG/DL (ref 65–140)
GLUCOSE SERPL-MCNC: 78 MG/DL (ref 65–140)
GLUCOSE UR STRIP-MCNC: ABNORMAL MG/DL
HCT VFR BLD AUTO: 40.6 % (ref 34.8–46.1)
HGB BLD-MCNC: 12.5 G/DL (ref 11.5–15.4)
HGB UR QL STRIP.AUTO: ABNORMAL
IMM GRANULOCYTES # BLD AUTO: 0.05 THOUSAND/UL (ref 0–0.2)
IMM GRANULOCYTES NFR BLD AUTO: 1 % (ref 0–2)
KETONES UR STRIP-MCNC: NEGATIVE MG/DL
LACTATE SERPL-SCNC: 0.9 MMOL/L (ref 0.5–2)
LEUKOCYTE ESTERASE UR QL STRIP: NEGATIVE
LYMPHOCYTES # BLD AUTO: 1.76 THOUSANDS/ΜL (ref 0.6–4.47)
LYMPHOCYTES NFR BLD AUTO: 19 % (ref 14–44)
MCH RBC QN AUTO: 29.4 PG (ref 26.8–34.3)
MCHC RBC AUTO-ENTMCNC: 30.8 G/DL (ref 31.4–37.4)
MCV RBC AUTO: 96 FL (ref 82–98)
MONOCYTES # BLD AUTO: 1.08 THOUSAND/ΜL (ref 0.17–1.22)
MONOCYTES NFR BLD AUTO: 12 % (ref 4–12)
NEUTROPHILS # BLD AUTO: 6.16 THOUSANDS/ΜL (ref 1.85–7.62)
NEUTS SEG NFR BLD AUTO: 67 % (ref 43–75)
NITRITE UR QL STRIP: NEGATIVE
NON-SQ EPI CELLS URNS QL MICRO: ABNORMAL /HPF
NRBC BLD AUTO-RTO: 0 /100 WBCS
PH UR STRIP.AUTO: 6.5 [PH]
PLATELET # BLD AUTO: 167 THOUSANDS/UL (ref 149–390)
PMV BLD AUTO: 10.1 FL (ref 8.9–12.7)
POTASSIUM SERPL-SCNC: 4.3 MMOL/L (ref 3.5–5.3)
PROT SERPL-MCNC: 7 G/DL (ref 6.4–8.4)
PROT UR STRIP-MCNC: NEGATIVE MG/DL
RBC # BLD AUTO: 4.25 MILLION/UL (ref 3.81–5.12)
RBC #/AREA URNS AUTO: ABNORMAL /HPF
S PYO DNA THROAT QL NAA+PROBE: NOT DETECTED
SODIUM SERPL-SCNC: 136 MMOL/L (ref 135–147)
SP GR UR STRIP.AUTO: 1.01 (ref 1–1.03)
UROBILINOGEN UR STRIP-ACNC: <2 MG/DL
WBC # BLD AUTO: 9.14 THOUSAND/UL (ref 4.31–10.16)
WBC #/AREA URNS AUTO: ABNORMAL /HPF

## 2024-09-20 PROCEDURE — 82360 CALCULUS ASSAY QUANT: CPT | Performed by: UROLOGY

## 2024-09-20 PROCEDURE — 83605 ASSAY OF LACTIC ACID: CPT

## 2024-09-20 PROCEDURE — C1769 GUIDE WIRE: HCPCS | Performed by: UROLOGY

## 2024-09-20 PROCEDURE — C1758 CATHETER, URETERAL: HCPCS | Performed by: UROLOGY

## 2024-09-20 PROCEDURE — 96374 THER/PROPH/DIAG INJ IV PUSH: CPT

## 2024-09-20 PROCEDURE — 87106 FUNGI IDENTIFICATION YEAST: CPT | Performed by: UROLOGY

## 2024-09-20 PROCEDURE — 82948 REAGENT STRIP/BLOOD GLUCOSE: CPT

## 2024-09-20 PROCEDURE — C1747 URETEROSCOPE DIGITAL FLEX SNGL USE RVS DEFLECTION APTRA: HCPCS | Performed by: UROLOGY

## 2024-09-20 PROCEDURE — 36415 COLL VENOUS BLD VENIPUNCTURE: CPT

## 2024-09-20 PROCEDURE — 87086 URINE CULTURE/COLONY COUNT: CPT | Performed by: UROLOGY

## 2024-09-20 PROCEDURE — 52356 CYSTO/URETERO W/LITHOTRIPSY: CPT | Performed by: UROLOGY

## 2024-09-20 PROCEDURE — 80197 ASSAY OF TACROLIMUS: CPT | Performed by: INTERNAL MEDICINE

## 2024-09-20 PROCEDURE — 80053 COMPREHEN METABOLIC PANEL: CPT

## 2024-09-20 PROCEDURE — 0TC48ZZ EXTIRPATION OF MATTER FROM LEFT KIDNEY PELVIS, VIA NATURAL OR ARTIFICIAL OPENING ENDOSCOPIC: ICD-10-PCS | Performed by: UROLOGY

## 2024-09-20 PROCEDURE — C1894 INTRO/SHEATH, NON-LASER: HCPCS | Performed by: UROLOGY

## 2024-09-20 PROCEDURE — 0T778DZ DILATION OF LEFT URETER WITH INTRALUMINAL DEVICE, VIA NATURAL OR ARTIFICIAL OPENING ENDOSCOPIC: ICD-10-PCS | Performed by: UROLOGY

## 2024-09-20 PROCEDURE — 74420 UROGRAPHY RTRGR +-KUB: CPT

## 2024-09-20 PROCEDURE — 99222 1ST HOSP IP/OBS MODERATE 55: CPT | Performed by: INTERNAL MEDICINE

## 2024-09-20 PROCEDURE — 87651 STREP A DNA AMP PROBE: CPT | Performed by: INTERNAL MEDICINE

## 2024-09-20 PROCEDURE — 99244 OFF/OP CNSLTJ NEW/EST MOD 40: CPT | Performed by: UROLOGY

## 2024-09-20 PROCEDURE — 96376 TX/PRO/DX INJ SAME DRUG ADON: CPT

## 2024-09-20 PROCEDURE — 85025 COMPLETE CBC W/AUTO DIFF WBC: CPT

## 2024-09-20 PROCEDURE — C2625 STENT, NON-COR, TEM W/DEL SY: HCPCS | Performed by: UROLOGY

## 2024-09-20 PROCEDURE — 81001 URINALYSIS AUTO W/SCOPE: CPT

## 2024-09-20 PROCEDURE — 96375 TX/PRO/DX INJ NEW DRUG ADDON: CPT

## 2024-09-20 PROCEDURE — 96361 HYDRATE IV INFUSION ADD-ON: CPT

## 2024-09-20 PROCEDURE — 74176 CT ABD & PELVIS W/O CONTRAST: CPT

## 2024-09-20 PROCEDURE — 81025 URINE PREGNANCY TEST: CPT

## 2024-09-20 PROCEDURE — 99285 EMERGENCY DEPT VISIT HI MDM: CPT

## 2024-09-20 PROCEDURE — 99284 EMERGENCY DEPT VISIT MOD MDM: CPT

## 2024-09-20 PROCEDURE — BT1F1ZZ FLUOROSCOPY OF LEFT KIDNEY, URETER AND BLADDER USING LOW OSMOLAR CONTRAST: ICD-10-PCS | Performed by: UROLOGY

## 2024-09-20 DEVICE — INLAY OPTIMA URETERAL STENT W/O GUIDEWIRE
Type: IMPLANTABLE DEVICE | Site: URETER | Status: FUNCTIONAL
Brand: BARD® INLAY OPTIMA® URETERAL STENT

## 2024-09-20 RX ORDER — ONDANSETRON 2 MG/ML
INJECTION INTRAMUSCULAR; INTRAVENOUS AS NEEDED
Status: DISCONTINUED | OUTPATIENT
Start: 2024-09-20 | End: 2024-09-20

## 2024-09-20 RX ORDER — FAMOTIDINE 20 MG/1
40 TABLET, FILM COATED ORAL
Status: DISCONTINUED | OUTPATIENT
Start: 2024-09-20 | End: 2024-09-26 | Stop reason: HOSPADM

## 2024-09-20 RX ORDER — SODIUM CHLORIDE 9 MG/ML
INJECTION, SOLUTION INTRAVENOUS CONTINUOUS PRN
Status: DISCONTINUED | OUTPATIENT
Start: 2024-09-20 | End: 2024-09-20

## 2024-09-20 RX ORDER — ACETAMINOPHEN 325 MG/1
650 TABLET ORAL EVERY 6 HOURS PRN
Status: DISCONTINUED | OUTPATIENT
Start: 2024-09-20 | End: 2024-09-22

## 2024-09-20 RX ORDER — MULTIVIT WITH MINERALS/LUTEIN
125 TABLET ORAL DAILY
Status: DISCONTINUED | OUTPATIENT
Start: 2024-09-21 | End: 2024-09-26 | Stop reason: HOSPADM

## 2024-09-20 RX ORDER — FENTANYL CITRATE 50 UG/ML
INJECTION, SOLUTION INTRAMUSCULAR; INTRAVENOUS CONTINUOUS PRN
Status: DISCONTINUED | OUTPATIENT
Start: 2024-09-20 | End: 2024-09-20

## 2024-09-20 RX ORDER — PREGABALIN 75 MG/1
150 CAPSULE ORAL 2 TIMES DAILY
Status: DISCONTINUED | OUTPATIENT
Start: 2024-09-20 | End: 2024-09-26 | Stop reason: HOSPADM

## 2024-09-20 RX ORDER — OXYCODONE HYDROCHLORIDE 5 MG/1
5 TABLET ORAL EVERY 4 HOURS PRN
Status: DISCONTINUED | OUTPATIENT
Start: 2024-09-20 | End: 2024-09-26 | Stop reason: HOSPADM

## 2024-09-20 RX ORDER — TACROLIMUS 1 MG/1
3 CAPSULE ORAL EVERY EVENING
Status: DISCONTINUED | OUTPATIENT
Start: 2024-09-20 | End: 2024-09-21

## 2024-09-20 RX ORDER — BUDESONIDE AND FORMOTEROL FUMARATE DIHYDRATE 160; 4.5 UG/1; UG/1
2 AEROSOL RESPIRATORY (INHALATION) 2 TIMES DAILY
Status: DISCONTINUED | OUTPATIENT
Start: 2024-09-20 | End: 2024-09-26 | Stop reason: HOSPADM

## 2024-09-20 RX ORDER — OXYCODONE HYDROCHLORIDE 5 MG/1
5 TABLET ORAL EVERY 6 HOURS PRN
Status: DISCONTINUED | OUTPATIENT
Start: 2024-09-20 | End: 2024-09-20

## 2024-09-20 RX ORDER — GUAIFENESIN/DEXTROMETHORPHAN 100-10MG/5
5 SYRUP ORAL EVERY 6 HOURS PRN
Status: DISCONTINUED | OUTPATIENT
Start: 2024-09-20 | End: 2024-09-26 | Stop reason: HOSPADM

## 2024-09-20 RX ORDER — SODIUM CHLORIDE, SODIUM LACTATE, POTASSIUM CHLORIDE, CALCIUM CHLORIDE 600; 310; 30; 20 MG/100ML; MG/100ML; MG/100ML; MG/100ML
100 INJECTION, SOLUTION INTRAVENOUS CONTINUOUS
Status: DISCONTINUED | OUTPATIENT
Start: 2024-09-20 | End: 2024-09-20

## 2024-09-20 RX ORDER — OXYCODONE HYDROCHLORIDE 10 MG/1
10 TABLET ORAL EVERY 4 HOURS PRN
Status: DISCONTINUED | OUTPATIENT
Start: 2024-09-20 | End: 2024-09-26 | Stop reason: HOSPADM

## 2024-09-20 RX ORDER — HYDROMORPHONE HCL/PF 1 MG/ML
0.5 SYRINGE (ML) INJECTION EVERY 4 HOURS PRN
Status: DISCONTINUED | OUTPATIENT
Start: 2024-09-20 | End: 2024-09-20

## 2024-09-20 RX ORDER — HYDROMORPHONE HCL IN WATER/PF 6 MG/30 ML
0.2 PATIENT CONTROLLED ANALGESIA SYRINGE INTRAVENOUS
Status: DISCONTINUED | OUTPATIENT
Start: 2024-09-20 | End: 2024-09-20 | Stop reason: HOSPADM

## 2024-09-20 RX ORDER — HYDROMORPHONE HCL/PF 1 MG/ML
0.5 SYRINGE (ML) INJECTION EVERY 2 HOUR PRN
Status: DISCONTINUED | OUTPATIENT
Start: 2024-09-20 | End: 2024-09-21

## 2024-09-20 RX ORDER — ATOVAQUONE 750 MG/5ML
1500 SUSPENSION ORAL DAILY
Status: DISCONTINUED | OUTPATIENT
Start: 2024-09-20 | End: 2024-09-26 | Stop reason: HOSPADM

## 2024-09-20 RX ORDER — CEFAZOLIN SODIUM 1 G/3ML
INJECTION, POWDER, FOR SOLUTION INTRAMUSCULAR; INTRAVENOUS AS NEEDED
Status: DISCONTINUED | OUTPATIENT
Start: 2024-09-20 | End: 2024-09-20

## 2024-09-20 RX ORDER — PANTOPRAZOLE SODIUM 40 MG/1
40 TABLET, DELAYED RELEASE ORAL
Status: DISCONTINUED | OUTPATIENT
Start: 2024-09-20 | End: 2024-09-26 | Stop reason: HOSPADM

## 2024-09-20 RX ORDER — FENTANYL CITRATE/PF 50 MCG/ML
25 SYRINGE (ML) INJECTION
Status: DISCONTINUED | OUTPATIENT
Start: 2024-09-20 | End: 2024-09-20 | Stop reason: HOSPADM

## 2024-09-20 RX ORDER — DEXAMETHASONE SODIUM PHOSPHATE 10 MG/ML
INJECTION, SOLUTION INTRAMUSCULAR; INTRAVENOUS AS NEEDED
Status: DISCONTINUED | OUTPATIENT
Start: 2024-09-20 | End: 2024-09-20

## 2024-09-20 RX ORDER — ONDANSETRON 4 MG/1
4 TABLET, ORALLY DISINTEGRATING ORAL EVERY 6 HOURS PRN
Status: DISCONTINUED | OUTPATIENT
Start: 2024-09-20 | End: 2024-09-26 | Stop reason: HOSPADM

## 2024-09-20 RX ORDER — AZITHROMYCIN 250 MG/1
250 TABLET, FILM COATED ORAL 3 TIMES WEEKLY
Status: DISCONTINUED | OUTPATIENT
Start: 2024-09-20 | End: 2024-09-26 | Stop reason: HOSPADM

## 2024-09-20 RX ORDER — ONDANSETRON 2 MG/ML
4 INJECTION INTRAMUSCULAR; INTRAVENOUS ONCE
Status: COMPLETED | OUTPATIENT
Start: 2024-09-20 | End: 2024-09-20

## 2024-09-20 RX ORDER — OXYCODONE HYDROCHLORIDE 10 MG/1
10 TABLET ORAL EVERY 6 HOURS PRN
Status: DISCONTINUED | OUTPATIENT
Start: 2024-09-20 | End: 2024-09-20

## 2024-09-20 RX ORDER — HYDROMORPHONE HCL/PF 1 MG/ML
0.5 SYRINGE (ML) INJECTION ONCE
Status: COMPLETED | OUTPATIENT
Start: 2024-09-20 | End: 2024-09-20

## 2024-09-20 RX ORDER — LANOLIN ALCOHOL/MO/W.PET/CERES
400 CREAM (GRAM) TOPICAL DAILY
Status: DISCONTINUED | OUTPATIENT
Start: 2024-09-21 | End: 2024-09-26 | Stop reason: HOSPADM

## 2024-09-20 RX ORDER — OXYBUTYNIN CHLORIDE 5 MG/1
5 TABLET ORAL 3 TIMES DAILY
Status: DISCONTINUED | OUTPATIENT
Start: 2024-09-20 | End: 2024-09-26 | Stop reason: HOSPADM

## 2024-09-20 RX ORDER — ONDANSETRON 2 MG/ML
4 INJECTION INTRAMUSCULAR; INTRAVENOUS ONCE AS NEEDED
Status: DISCONTINUED | OUTPATIENT
Start: 2024-09-20 | End: 2024-09-20 | Stop reason: HOSPADM

## 2024-09-20 RX ORDER — KETOROLAC TROMETHAMINE 30 MG/ML
15 INJECTION, SOLUTION INTRAMUSCULAR; INTRAVENOUS ONCE
Status: COMPLETED | OUTPATIENT
Start: 2024-09-20 | End: 2024-09-20

## 2024-09-20 RX ORDER — INSULIN LISPRO 100 [IU]/ML
1-5 INJECTION, SOLUTION INTRAVENOUS; SUBCUTANEOUS
Status: DISCONTINUED | OUTPATIENT
Start: 2024-09-20 | End: 2024-09-26 | Stop reason: HOSPADM

## 2024-09-20 RX ORDER — PROPOFOL 10 MG/ML
INJECTION, EMULSION INTRAVENOUS AS NEEDED
Status: DISCONTINUED | OUTPATIENT
Start: 2024-09-20 | End: 2024-09-20

## 2024-09-20 RX ORDER — HYDROMORPHONE HCL/PF 1 MG/ML
SYRINGE (ML) INJECTION AS NEEDED
Status: DISCONTINUED | OUTPATIENT
Start: 2024-09-20 | End: 2024-09-20

## 2024-09-20 RX ORDER — VALACYCLOVIR HYDROCHLORIDE 500 MG/1
500 TABLET, FILM COATED ORAL DAILY
Status: DISCONTINUED | OUTPATIENT
Start: 2024-09-20 | End: 2024-09-26 | Stop reason: HOSPADM

## 2024-09-20 RX ORDER — KETOROLAC TROMETHAMINE 30 MG/ML
15 INJECTION, SOLUTION INTRAMUSCULAR; INTRAVENOUS ONCE
Status: CANCELLED | OUTPATIENT
Start: 2024-09-20 | End: 2024-09-20

## 2024-09-20 RX ORDER — ALBUTEROL SULFATE 90 UG/1
2 INHALANT RESPIRATORY (INHALATION) EVERY 4 HOURS PRN
Status: DISCONTINUED | OUTPATIENT
Start: 2024-09-20 | End: 2024-09-26 | Stop reason: HOSPADM

## 2024-09-20 RX ORDER — INSULIN LISPRO 100 [IU]/ML
2 INJECTION, SOLUTION INTRAVENOUS; SUBCUTANEOUS
Status: DISCONTINUED | OUTPATIENT
Start: 2024-09-20 | End: 2024-09-25

## 2024-09-20 RX ORDER — MAGNESIUM HYDROXIDE 1200 MG/15ML
LIQUID ORAL AS NEEDED
Status: DISCONTINUED | OUTPATIENT
Start: 2024-09-20 | End: 2024-09-20 | Stop reason: HOSPADM

## 2024-09-20 RX ORDER — LIDOCAINE HYDROCHLORIDE 10 MG/ML
INJECTION, SOLUTION EPIDURAL; INFILTRATION; INTRACAUDAL; PERINEURAL AS NEEDED
Status: DISCONTINUED | OUTPATIENT
Start: 2024-09-20 | End: 2024-09-20

## 2024-09-20 RX ORDER — INSULIN GLARGINE 100 [IU]/ML
12 INJECTION, SOLUTION SUBCUTANEOUS EVERY MORNING
Status: DISCONTINUED | OUTPATIENT
Start: 2024-09-21 | End: 2024-09-26 | Stop reason: HOSPADM

## 2024-09-20 RX ORDER — MIDAZOLAM HYDROCHLORIDE 2 MG/2ML
INJECTION, SOLUTION INTRAMUSCULAR; INTRAVENOUS AS NEEDED
Status: DISCONTINUED | OUTPATIENT
Start: 2024-09-20 | End: 2024-09-20

## 2024-09-20 RX ORDER — SODIUM CHLORIDE 9 MG/ML
100 INJECTION, SOLUTION INTRAVENOUS CONTINUOUS
Status: DISCONTINUED | OUTPATIENT
Start: 2024-09-20 | End: 2024-09-20

## 2024-09-20 RX ORDER — ALPRAZOLAM 0.5 MG
1 TABLET ORAL
Status: DISCONTINUED | OUTPATIENT
Start: 2024-09-20 | End: 2024-09-26 | Stop reason: HOSPADM

## 2024-09-20 RX ORDER — FUROSEMIDE 40 MG
40 TABLET ORAL DAILY
Status: DISCONTINUED | OUTPATIENT
Start: 2024-09-21 | End: 2024-09-23

## 2024-09-20 RX ORDER — HYDROMORPHONE HCL/PF 1 MG/ML
0.5 SYRINGE (ML) INJECTION
Status: DISCONTINUED | OUTPATIENT
Start: 2024-09-20 | End: 2024-09-20 | Stop reason: HOSPADM

## 2024-09-20 RX ADMIN — AZITHROMYCIN DIHYDRATE 250 MG: 250 TABLET ORAL at 18:24

## 2024-09-20 RX ADMIN — SODIUM CHLORIDE 1000 ML: 0.9 INJECTION, SOLUTION INTRAVENOUS at 07:57

## 2024-09-20 RX ADMIN — PREDNISONE 15 MG: 5 TABLET ORAL at 18:24

## 2024-09-20 RX ADMIN — CEFAZOLIN 2000 MG: 1 INJECTION, POWDER, FOR SOLUTION INTRAMUSCULAR; INTRAVENOUS at 14:37

## 2024-09-20 RX ADMIN — HYDROMORPHONE HYDROCHLORIDE 0.5 MG: 1 INJECTION, SOLUTION INTRAMUSCULAR; INTRAVENOUS; SUBCUTANEOUS at 08:26

## 2024-09-20 RX ADMIN — HYDROMORPHONE HYDROCHLORIDE 0.5 MG: 1 INJECTION, SOLUTION INTRAMUSCULAR; INTRAVENOUS; SUBCUTANEOUS at 10:06

## 2024-09-20 RX ADMIN — FENTANYL CITRATE 25 MCG: 50 INJECTION INTRAMUSCULAR; INTRAVENOUS at 16:15

## 2024-09-20 RX ADMIN — HYDROMORPHONE HYDROCHLORIDE 0.5 MG: 1 INJECTION, SOLUTION INTRAMUSCULAR; INTRAVENOUS; SUBCUTANEOUS at 13:05

## 2024-09-20 RX ADMIN — OXYCODONE HYDROCHLORIDE 10 MG: 10 TABLET ORAL at 22:49

## 2024-09-20 RX ADMIN — VALACYCLOVIR HYDROCHLORIDE 500 MG: 500 TABLET, FILM COATED ORAL at 19:51

## 2024-09-20 RX ADMIN — HYDROMORPHONE HYDROCHLORIDE 0.5 MG: 1 INJECTION, SOLUTION INTRAMUSCULAR; INTRAVENOUS; SUBCUTANEOUS at 11:35

## 2024-09-20 RX ADMIN — DEXAMETHASONE SODIUM PHOSPHATE 10 MG: 10 INJECTION, SOLUTION INTRAMUSCULAR; INTRAVENOUS at 15:00

## 2024-09-20 RX ADMIN — SODIUM CHLORIDE: 0.9 INJECTION, SOLUTION INTRAVENOUS at 14:26

## 2024-09-20 RX ADMIN — ALPRAZOLAM 1 MG: 0.5 TABLET ORAL at 22:46

## 2024-09-20 RX ADMIN — KETOROLAC TROMETHAMINE 15 MG: 30 INJECTION, SOLUTION INTRAMUSCULAR at 08:24

## 2024-09-20 RX ADMIN — OXYBUTYNIN CHLORIDE 5 MG: 5 TABLET ORAL at 20:24

## 2024-09-20 RX ADMIN — ONDANSETRON 4 MG: 4 TABLET, ORALLY DISINTEGRATING ORAL at 22:46

## 2024-09-20 RX ADMIN — HYDROMORPHONE HYDROCHLORIDE 0.5 MG: 1 INJECTION, SOLUTION INTRAMUSCULAR; INTRAVENOUS; SUBCUTANEOUS at 17:33

## 2024-09-20 RX ADMIN — OXYCODONE HYDROCHLORIDE 10 MG: 10 TABLET ORAL at 18:49

## 2024-09-20 RX ADMIN — ONDANSETRON 4 MG: 2 INJECTION INTRAMUSCULAR; INTRAVENOUS at 07:57

## 2024-09-20 RX ADMIN — LIDOCAINE HYDROCHLORIDE 30 MG: 10 INJECTION, SOLUTION EPIDURAL; INFILTRATION; INTRACAUDAL; PERINEURAL at 14:34

## 2024-09-20 RX ADMIN — PANTOPRAZOLE SODIUM 40 MG: 40 TABLET, DELAYED RELEASE ORAL at 18:24

## 2024-09-20 RX ADMIN — FENTANYL CITRATE 25 MCG: 50 INJECTION INTRAMUSCULAR; INTRAVENOUS at 16:20

## 2024-09-20 RX ADMIN — PROPOFOL 100 MG: 10 INJECTION, EMULSION INTRAVENOUS at 14:34

## 2024-09-20 RX ADMIN — TACROLIMUS 3 MG: 1 CAPSULE ORAL at 19:51

## 2024-09-20 RX ADMIN — HYDROMORPHONE HYDROCHLORIDE 0.5 MG: 1 INJECTION, SOLUTION INTRAMUSCULAR; INTRAVENOUS; SUBCUTANEOUS at 14:30

## 2024-09-20 RX ADMIN — FAMOTIDINE 40 MG: 20 TABLET, FILM COATED ORAL at 22:37

## 2024-09-20 RX ADMIN — PANCRELIPASE 48000 UNITS: 120000; 24000; 76000 CAPSULE, DELAYED RELEASE PELLETS ORAL at 22:37

## 2024-09-20 RX ADMIN — MIDAZOLAM 2 MG: 1 INJECTION INTRAMUSCULAR; INTRAVENOUS at 14:30

## 2024-09-20 RX ADMIN — HYDROMORPHONE HYDROCHLORIDE 0.5 MG: 1 INJECTION, SOLUTION INTRAMUSCULAR; INTRAVENOUS; SUBCUTANEOUS at 19:51

## 2024-09-20 RX ADMIN — HYDROMORPHONE HYDROCHLORIDE 0.5 MG: 1 INJECTION, SOLUTION INTRAMUSCULAR; INTRAVENOUS; SUBCUTANEOUS at 07:57

## 2024-09-20 RX ADMIN — ONDANSETRON 4 MG: 2 INJECTION INTRAMUSCULAR; INTRAVENOUS at 15:00

## 2024-09-20 RX ADMIN — APIXABAN 5 MG: 5 TABLET, FILM COATED ORAL at 18:24

## 2024-09-20 RX ADMIN — BUDESONIDE AND FORMOTEROL FUMARATE DIHYDRATE 2 PUFF: 160; 4.5 AEROSOL RESPIRATORY (INHALATION) at 19:51

## 2024-09-20 RX ADMIN — PREGABALIN 150 MG: 75 CAPSULE ORAL at 18:24

## 2024-09-20 NOTE — OP NOTE
OPERATIVE REPORT  PATIENT NAME: Maximilian Mann    :  1975  MRN: [id number]  Pt Location: [facility] ROOM 01    SURGERY DATE: 2024    Surgeons and Role:     * Lee Briceno MD - Primary    Preop Diagnosis:  Left renal stone measuring 1.5 cm    Postop diagnosis  Severe left lower pole inflammation   left renal stone measuring 1.5 cm      Procedure(s):  Left - CYSTOSCOPY URETEROSCOPY WITH LITHOTRIPSY HOLMIUM LASER. RETROGRADE PYELOGRAM AND INSERTION STENT URETERAL. BASKET STONE EXTRACTION    Specimen(s):  ID Type Source Tests Collected by Time Destination   A :  Urine Urine, Cystoscopic URINE CULTURE Lee Briceno MD 2024 1452    B :  Calculus Kidney, Left STONE ANALYSIS Lee Briceno MD 2024 1503        Estimated Blood Loss:   Minimal    Drains:  * No LDAs found *    Anesthesia Type:   General    Operative Indications:  Recurrent nephrolithiasis as well as cystic fibrosis and bilateral lung transplant who presented with severe left-sided flank pain with imaging showing a 1.5 cm left renal stone with lower pole hydronephrosis.    The patient was offered stent with the second stage stone surgery knowing that her stone was quite large and may be better treated with larger access sheath and generation 2 CVAC which cannot be offered today versus attempting definitive stone surgery today knowing that there is a good chance she may need a second stone surgery.  She wanted to attempt surgery today knowing she may need a second stone surgery.    Operative Findings:  Hard brown renal stone obstructing the left renal pelvis and lower pole  Aptra one-time use digital flexible ureteroscope used given patient's large hard stone  Significant volume of inflammatory tissue and debris in the lower pole that was evacuated after stone was partially fragmented.  Large stone took significant amount of energy and time to fragment and basket extract.  Retrograde pyelogram end of procedure looked  unremarkable      Complications:   None    Procedure and Technique:    After informed consent including the risks of bleeding, infection, ureteral injury, and need for secondary procedures, patient was placed supine in the operating room theater.  Gen. anesthesia was administered.      They were then placed in the dorsal lithotomy position and sterilely prepped and draped in usual fashion. Antibiotic prophylaxis and DVT prophylaxis were administered Cystoscopy was performed the 21 Maltese cystoscope 30° lens.  This revealed a normal urethra.  Inspection of the bladder revealed no abnormalities.  Fluoroscopy did show evidence of a stone in the left kidney.    Attention was turned to the left ureteral orifice. A 5fr open ended catheter was attempted to be passed into the ureteral orifice. A retrograde ureteropyelogram was performed showing normal ureter and collecting system with stone. Then a 0.38 solo guidewire was passed through the open ended catheter and up the ureter into the renal pelvis. The scope was removed and reintroduced and a second solo wire was passed up the ureter into the renal pelvis.    An 10/12 x 35 cm access sheath was then sequentially placed (inner sheath and then inner + outer sheath) over one of the wires under fluoroscopic guidance with minimal resistance.    An Aptra one-time use digital flexible ureteroscope was then passed through the access sheath.     The stone was encountered in the in the renal pelvis were seen to be lodged blocking off the lower pole.      This was fragmented with the use of a 270 micron holmium laser fiber at settings of 0.6J and 40Hz and 2.0J and 15Hz.  The stone was very hard and took a significant energy to fragment.  After it was trying to break up a large volume of inflammatory debris was seen to be over taking the lower pole such that visualization of the lower pole was initially impossible.  With time debris was evacuated through the access sheath and we could  enter the lower pole and no further fragment stone.    Sequential passes were then made with a 1.9fr zero tip wire basket in order to retrieve stone debris.  Stone pieces were too large to extract and therefore the laser was used to fragment the smaller.  We also exchange her access sheath to be 11/13 to facilitate stone extraction.  At the end of fragmentation and basket extraction the collecting system was examined again and no significant residual stone fragments were appreciated.  Hemostasis was appropriate.       The ureteroscope was backed down the ureter under vision and there were no residual fragments and the ureter was noted to be intact with no injury.    A retrograde was performed from distal ureter and appeared unremarkable without extravasation of contrast.    Cystoscope was reassembled over the guidewire and a 6 x 22 double-J stent inserted without difficulty with partial coil seen in left collecting system on fluoroscopy and visually in the bladder.  The string was not left on. The bladder was drained.   The patient was placed back supine, awakened from general anesthesia and brought to recovery room in stable condition.     A qualified resident physician was not available.    Patient Disposition:  PACU     Plan:   CT scan in 2 weeks and if minimal stone burden is left then stent removal.  If there is still a large stone burden left then plan for second stage ureteroscopy potentially with CVAC generation 2             SIGNATURE: Lee Briceno MD  DATE: September 20, 2024  TIME: 4:05 PM

## 2024-09-20 NOTE — ASSESSMENT & PLAN NOTE
Lab Results   Component Value Date    HGBA1C 7.6 (H) 05/27/2024       Recent Labs     09/20/24  1557 09/20/24  1845 09/20/24 2052   POCGLU 78 67 146*       Blood Sugar Average: Last 72 hrs:  (P) 97Home regimen: Lantus 12 units in am, Aspart 6 units TID  Drop in blood sugar to 67 this evening  Ct Lantus 12 units in am, add Humalog at 2 units TID and sliding scale insulin  Monitor blood sugars and adjust insulin accordingly

## 2024-09-20 NOTE — DISCHARGE INSTR - AVS FIRST PAGE
Maximilian Mann:    Your surgery went well but you had a very large stone that caused a lot of inflammation in the lower pole of your kidney aching and hard to visualize that area well.  Your stone was fragmented to small pieces and subsequent fragments extracted via basket such that a residual stone should be small like grains of sand that can pass on their own.  Because of how large stone was I think it is best to get a CT scan in 2 weeks to see if there is any large stone fragments left.  If there are then we will consider going back to operating.  If there are not then we will plan to remove your stent in the clinic via cystoscopy procedure.  This is a quick outpatient procedure for which you are awake and has mild discomfort.    Please plan to take an antibiotic around the stent removal starting the day before, the day of and finishing the day after your clinic visit in case we do stent removal.    Please take your medications as prescribed with caution for comfort.  Most importantly please drink 6-8 glasses of water per day    Please call with any questions or concerns.    Lee Briceno MD  [facility]  [phone]            WHAT IS A STENT?  At the end of the procedure, your doctor may place a stent into your ureter. A stent is a thin, flexible piece of plastic that will hold open your ureter while the remaining small pieces of stone pass. This allows your kidney to drain easily and prevents you from having to “pass” these small stone pieces on your own, which could be painful. The stent is about 12 inches long and looks and feels like a thin piece of spaghetti.    AFTER THE PROCEDURE  After the procedure you may experience the following symptoms. All of these are normal and should resolve within 1 or 2 days after your stent is removed.  Urinary frequency (urinating more often than usual)  Urinary urgency (the sensation that you need to urinate right away)  Painful urination (this  can be pain in your bladder or in your back when  you urinate)  Blood in your urine ( a stent can irritate the lining of your bladder causing it to bleed)  Back/Flank pain, especially with urination    You may receive a prescription for narcotic pain medication after the procedure. You will also receive a prescription for tamsulosin which you will take once a day for 2 weeks to help relax your ureter and decrease stent discomfort. You will also need to purchase a stool softener (i.e. Colace) or mild laxative (i.e. Miralax) as the narcotic pain medication can make you constipated. This is important as constipation can exacerbate stent related symptoms.     STENT REMOVAL  In some cases, your doctor will leave strings attached to your stent. The strings will be taped to your skin after the procedure. The strings will allow you to remove the thin flexible stent while you are at home. Normally, the stent can be removed 3-5 days after your procedure; your physician will tell you the specific date after your procedure.     On the day you are supposed to remove your stent, do the following:  When you wake up in the morning, take 1-2 pain pills with food.  Start your antibiotic pill the morning of schedule stent removal if prescribed  One hour later sit on the toilet or in the bath tub.  Take a deep breath in and while exhaling, pull the string.   Dispose of the stent in the garbage.    Alternatively, you will come back for an office procedure to remove the stent by placing a small camera into your bladder to remove the stent.    During the next 4-8 hours after removing your stent, you may experience additional blood in your urine, pain with urination or back/side pain. You should take the pain medication you were prescribed to help you with the pain, as well as continue the Flomax. If the pain is severe, you are vomiting, and/or have a fever > 101.4 please call the clinic.

## 2024-09-20 NOTE — ASSESSMENT & PLAN NOTE
Lab Results   Component Value Date    EGFR 53 09/20/2024    EGFR 54 (L) 08/16/2024    EGFR 43 05/26/2024    CREATININE 1.21 09/20/2024    CREATININE 1.15 (H) 08/18/2024    CREATININE 1.24 (H) 08/16/2024   Baseline creatinine 1 to 1.2  Monitor

## 2024-09-20 NOTE — ASSESSMENT & PLAN NOTE
Dilaudid IV added and frequency of Oxycodone increased due to acute on chronic pain from left renal calculus

## 2024-09-20 NOTE — ANESTHESIA POSTPROCEDURE EVALUATION
Post-Op Assessment Note    CV Status:  Stable    Pain management: adequate       Mental Status:  Somnolent   Hydration Status:  Euvolemic   PONV Controlled:  Controlled   Airway Patency:  Patent     Post Op Vitals Reviewed: Yes    No anethesia notable event occurred.    Staff: CRNA               /73 (09/20/24 1553)    Temp 97.6 °F (36.4 °C) (09/20/24 1553)    Pulse 59 (09/20/24 1553)   Resp 16 (09/20/24 1553)    SpO2 100 % (09/20/24 1553)

## 2024-09-20 NOTE — EMTALA/ACUTE CARE TRANSFER
Counts include 234 beds at the [facility] EMERGENCY DEPARTMENT  [address]  [place] [address]  Dept: [phone]      EMTALA TRANSFER CONSENT    NAME Maximilian Mann                                         1975                              MRN [id number]    I have been informed of my rights regarding examination, treatment, and transfer   by Dr. Merrick Jennings MD    Benefits: Specialized equipment and/or services available at the receiving facility (Include comment)________________________    Risks: Potential for delay in receiving treatment, Potential deterioration of medical condition, Loss of IV, Increased discomfort during transfer, Possible worsening of condition or death during transfer      Consent for Transfer:  I acknowledge that my medical condition has been evaluated and explained to me by the emergency department physician or other qualified medical person and/or my attending physician, who has recommended that I be transferred to the service of  Accepting Physician: Dr. Yonas Hernandez at Accepting Facility Name, City & State : [facility] [place]. The above potential benefits of such transfer, the potential risks associated with such transfer, and the probable risks of not being transferred have been explained to me, and I fully understand them.  The doctor has explained that, in my case, the benefits of transfer outweigh the risks.  I agree to be transferred.    I authorize the performance of emergency medical procedures and treatments upon me in both transit and upon arrival at the receiving facility.  Additionally, I authorize the release of any and all medical records to the receiving facility and request they be transported with me, if possible.  I understand that the safest mode of transportation during a medical emergency is an ambulance and that the Hospital advocates the use of this mode of transport. Risks of traveling to the receiving facility by  car, including absence of medical control, life sustaining equipment, such as oxygen, and medical personnel has been explained to me and I fully understand them.    (JUSTIN CORRECT BOX BELOW)  [  ]  I consent to the stated transfer and to be transported by ambulance/helicopter.  [  ]  I consent to the stated transfer, but refuse transportation by ambulance and accept full responsibility for my transportation by car.  I understand the risks of non-ambulance transfers and I exonerate the Hospital and its staff from any deterioration in my condition that results from this refusal.    X___________________________________________    DATE  24  TIME________  Signature of patient or legally responsible individual signing on patient behalf           RELATIONSHIP TO PATIENT_________________________          Provider Certification    NAME Maximilian Mann                                         1975                              MRN [id number]    A medical screening exam was performed on the above named patient.  Based on the examination:    Condition Necessitating Transfer The encounter diagnosis was Nephrolithiasis.    Patient Condition: The patient has been stabilized such that within reasonable medical probability, no material deterioration of the patient condition or the condition of the unborn child(joseph) is likely to result from the transfer    Reason for Transfer: Level of Care needed not available at this facility    Transfer Requirements: Facility [facility]   Space available and qualified personnel available for treatment as acknowledged by SUSIE Mathur  Agreed to accept transfer and to provide appropriate medical treatment as acknowledged by       Dr. Yonas Hernandez  Appropriate medical records of the examination and treatment of the patient are provided at the time of transfer   STAFF INITIAL WHEN COMPLETED _______  Transfer will be performed by qualified  personnel from    and appropriate transfer equipment as required, including the use of necessary and appropriate life support measures.    Provider Certification: I have examined the patient and explained the following risks and benefits of being transferred/refusing transfer to the patient/family:  General risk, such as traffic hazards, adverse weather conditions, rough terrain or turbulence, possible failure of equipment (including vehicle or aircraft), or consequences of actions of persons outside the control of the transport personnel      Based on these reasonable risks and benefits to the patient and/or the unborn child(joseph), and based upon the information available at the time of the patient’s examination, I certify that the medical benefits reasonably to be expected from the provision of appropriate medical treatments at another medical facility outweigh the increasing risks, if any, to the individual’s medical condition, and in the case of labor to the unborn child, from effecting the transfer.    X____________________________________________ DATE 09/20/24        TIME_______      ORIGINAL - SEND TO MEDICAL RECORDS   COPY - SEND WITH PATIENT DURING TRANSFER

## 2024-09-20 NOTE — RESPIRATORY THERAPY NOTE
RT Protocol Note  Maximilian Mann 48 y.o. female MRN: [id number]  Unit/Bed#: [facility] 934-01 Encounter: [id number]    Assessment    Principal Problem:    Renal calculus, left  Active Problems:    Type 1 diabetes mellitus with diabetic chronic kidney disease, unspecified CKD stage (HCC)    Pancreatic insufficiency    Asthma    GERD (gastroesophageal reflux disease)    Immunosuppression (HCC)    Stage 3a chronic kidney disease (HCC)    Hx of lung transplant (HCC)    Continuous opioid dependence (HCC)    History of pulmonary embolus (PE)    History of DVT (deep vein thrombosis)      Home Pulmonary Medications:  Ventolin MDI PRN  Albuterol Inhal Soln Rxs BID       Past Medical History:   Diagnosis Date    Abnormal uterine bleeding (AUB)     ABPA (allergic bronchopulmonary aspergillosis) ([facility]) 07/08/2012    Acute kidney injury ([facility]) 09/08/2017    Acute on chronic respiratory failure ([facility]) 03/04/2017    Last Assessment & Plan:  Formatting of this note might be different from the original. Recurrent with clinical worsening with enlarging PTX, s/p multiple chest tubes, but new fevers 6/19 and worsening leukocytosis indicating infection as cause of 6/19 decompensation with profound hypoxia in setting of shunting through right lung and continued pneumothorax s/p 2nd chest tube placement.  6/27: Right    Anaphylaxis 06/06/2022    Anemia     Asthma     Chest pain 09/21/2021    Chronic pain     Chronic respiratory failure with hypoxia ([facility]) 09/07/2021    COVID-19 06/03/2022    Cystic fibrosis (HCC)     Deep vein thrombosis (HCC)     Diabetes mellitus (HCC)     GERD (gastroesophageal reflux disease)     HBP (high blood pressure)     HCAP (healthcare-associated pneumonia) 03/06/2020    History of transfusion     Influenza B 02/23/2020    Kidney stone     Neutropenic fever  (HCC) 09/26/2020    Pneumonia due to Pseudomonas species ([facility]) 10/28/2010    Last Assessment & Plan:  Formatting of this note might be different from the  original. Polymicrobial pna w/ pseudomonas, M. Abscessus, Cadida glabrata/albicans    PONV (postoperative nausea and vomiting)     Pseudomonas aeruginosa infection 2012    Renal calculus     Squamous cell skin cancer 2023    SCCIS- Left upper thigh    Transplant recipient     Lung- , 2017    Ulcerative colitis (HCC)     Wellness examination 2013    Formatting of this note might be different from the original. Discussed with patient and acknowledgement form signed on 2013 (ALYSHA)     Social History     Socioeconomic History    Marital status: /Civil Union     Spouse name: Not on file    Number of children: Not on file    Years of education: 12    Highest education level: Not on file   Occupational History    Occupation: Disabled    Tobacco Use    Smoking status: Former     Current packs/day: 0.00     Average packs/day: 0.5 packs/day for 12.0 years (6.0 ttl pk-yrs)     Types: Cigarettes     Start date: 1993     Quit date: 2005     Years since quittin.7     Passive exposure: Past    Smokeless tobacco: Never   Vaping Use    Vaping status: Never Used   Substance and Sexual Activity    Alcohol use: Yes     Alcohol/week: 2.0 standard drinks of alcohol     Types: 2 Glasses of wine per week     Comment: social    Drug use: Yes     Frequency: 2.0 times per week     Types: Marijuana     Comment: medical edible marijuana prescribed    Sexual activity: Yes     Partners: Male     Birth control/protection: None   Other Topics Concern    Not on file   Social History Narrative    Most recent tobacco use screenin2019    Do you currently or have you served in the [employer]: No    Were you activated, into active duty, as a member of the [employer] or as a Reservist: No    Occupation: disabled    Education: 12    Marital status:     Exercise level: Occasional    Diet: Specific    low sugar, high fat    General stress level: High    Alcohol intake: Occasional    Caffeine  intake: Heavy    Chewing tobacco: none    Illicit drugs: none    Guns present in home: No    Seat belts used routinely: Yes    Smoke alarm in home: Yes    Advance directive: Yes     Social Determinants of Health     Financial Resource Strain: Low Risk  (11/10/2022)    Received from [facility], [facility]    Overall Financial Resource Strain (CARDIA)     Difficulty of Paying Living Expenses: Not hard at all   Food Insecurity: No Food Insecurity (8/17/2024)    Received from [facility]    Hunger Vital Sign     Worried About Running Out of Food in the Last Year: Never true     Ran Out of Food in the Last Year: Never true   Transportation Needs: No Transportation Needs (8/17/2024)    Received from [facility]    PRAPARE - Transportation     Lack of Transportation (Medical): No     Lack of Transportation (Non-Medical): No   Physical Activity: Insufficiently Active (1/15/2024)    Exercise Vital Sign     Days of Exercise per Week: 7 days     Minutes of Exercise per Session: 20 min   Stress: No Stress Concern Present (1/15/2024)    [employer] - Occupational Stress Questionnaire     Feeling of Stress : Only a little   Social Connections: Unknown (6/18/2024)    Received from [employer]    Social [employer]     How often do you feel lonely or isolated from those around you? (Adult - for ages 18 years and over): Not on file   Intimate Partner Violence: Not At Risk (1/15/2024)    Humiliation, Afraid, Rape, and Kick questionnaire     Fear of Current or Ex-Partner: No     Emotionally Abused: No     Physically Abused: No     Sexually Abused: No   Housing Stability: Low Risk  (8/7/2024)    Housing Stability Vital Sign     Unable to Pay for Housing in the Last Year: No     Number of Times Moved in the Last Year: 0     Homeless in the Last Year: No       Subjective      "    Objective    Physical Exam:   Assessment Type: Assess only  General Appearance: Awake, Other (Comment) (nauseated)  Respiratory Pattern: Normal  Chest Assessment: Chest expansion symmetrical  Bilateral Breath Sounds: Clear  L Breath Sounds: (P) Diminished  Cough: None  O2 Device: NC 2 lpm    Vitals:  Blood pressure 165/98, pulse 74, temperature 98.4 °F (36.9 °C), temperature source Oral, resp. rate 18, height 5' 2\" (1.575 m), weight 56.6 kg (124 lb 12.5 oz), SpO2 100%, not currently breastfeeding.          Imaging and other studies: Personally reviewed the following image studies in PACS and associated radiology reports: chest xray. My interpretation of the radiology images/reports is: See Notations from [place] below from 9-12-24.    O2 Device: NC 2 lpm     Plan    Respiratory Plan: Mild Distress pathway        Resp Comments: 49 y/o s/p Left - CYSTOSCOPY URETEROSCOPY WITH LITHOTRIPSY HOLMIUM LASER. RETROGRADE PYELOGRAM AND INSERTION STENT URETERAL. BASKET STONE EXTRACTION (today).  Pulm hx:  CF, Asthma, P.E., R pneumonectomy and lung transplant in 2013 and 2017.  Additional medical hs: DM; CA; chronic kidney disease, opiod dependence; include multiple surgeries and infections.  Last CXR from [facility] on 9-12:  \"Status post right pneumonectomy and left lung transplantation. No acute consolidation or pulmonary edema. No pneumothorax.\" Home meds limited to Albuterol inhal soln, BID and Ventolin MDI PRN.  Quit smoking cigarettes in 2005 (6 total pk yr history).  Found pt sitting on the edge of the bed in what appeared to be pain associated distress, breathing r/a with an SPo2 of 88%.  Convinced her to allow us to reapply the running NC and SpO2's rebounded > 92%.    CUrrently ordered to receive Symbicort and Proventil inhalers for BID and PRN usage.  Given her pulm hs, will suggest that  continue to follow her through this RCP and reassess if other modalities are needed.  For now,  nothing to suggest to add to her " orders.

## 2024-09-20 NOTE — ANESTHESIA PREPROCEDURE EVALUATION
Procedure:  CYSTOSCOPY URETEROSCOPY WITH LITHOTRIPSY HOLMIUM LASER, RETROGRADE PYELOGRAM AND INSERTION STENT URETERAL (Left: Bladder)    Relevant Problems   CARDIO   (+) DVT (deep venous thrombosis) (HCC)   (+) Essential hypertension   (+) Hypercholesteremia   (+) Rib pain      ENDO   (+) Type 1 diabetes mellitus with diabetic chronic kidney disease, unspecified CKD stage (HCC)      GI/HEPATIC   (+) Chronic pancreatitis, unspecified pancreatitis type (HCC)   (+) Esophageal reflux   (+) Hiatal hernia   (+) Pancreatic insufficiency      /RENAL   (+) Renal calculus, left   (+) Stage 3a chronic kidney disease (HCC)      HEMATOLOGY   (+) Acute on chronic anemia      MUSCULOSKELETAL   (+) Hiatal hernia      NEURO/PSYCH   (+) Anxiety   (+) Chronic pain disorder   (+) Continuous opioid dependence (HCC)   (+) Weakness of right lower extremity      PULMONARY   (+) Asthma             Anesthesia Plan  ASA Score- 3     Anesthesia Type- general with ASA Monitors.         Additional Monitors:     Airway Plan: LMA.           Plan Factors-    Chart reviewed.                      Induction- intravenous.    Postoperative Plan-         Informed Consent- Anesthetic plan and risks discussed with patient.  I personally reviewed this patient with the CRNA. Discussed and agreed on the Anesthesia Plan with the CRNA..

## 2024-09-20 NOTE — ED NOTES
Pt transported to [facility], attempted to give report, will wait for OR to call back.      Yamileth Nava RN  09/20/24 [id number]

## 2024-09-20 NOTE — ED PROVIDER NOTES
1. Nephrolithiasis      ED Disposition       ED Disposition   Transfer to Another Facility-In Network    Condition   --    Date/Time   Fri Sep 20, 2024  9:37 AM    Comment   Maximilian RAY Sourav Mann should be transferred out to [facility].               Assessment & Plan       Medical Decision Making  Abdominal exam is without peritoneal signs.  No evidence of acute abdomen at this time.  Given workup, low suspicion for acute biliary disease, acute pancreatitis, acute infectious process to include but not limited to pneumonia, diverticulitis, appendicitis.  Concerned PE due to history of PE, although less likely due to no tachycardia, shortness of breath, or recent long travel. CBC shows no leukocytosis, anemia.  CMP negative for metabolic derangements.  Lipase negative for pancreatitis.  CT renal stone study resulted: Large nephrolithiasis in the renal pelvis.  Patient's pain uncontrolled with multiple doses of Dilaudid.  Patient case discussed with urology and patient's case recommended to go to [facility] for surgery.  Patient agreed with plan of care.  Spoke with [facility] and was given clearance that the patient is able to have procedure done at [facility].  Spoke with nurse practitioner Maggie with lung transplant team and Dr. Kurtz. Patient to be transferred to [facility] with [facility] internal medicine as primary with a consult to urology.    Amount and/or Complexity of Data Reviewed  Labs: ordered.  Radiology: ordered.    Risk  Prescription drug management.                ED Course as of 09/20/24 1212   Fri Sep 20, 2024   0902 Spoke with urology about patient's case, awaiting on recommendations.   0921 Per Urology PA-C: sounds like needs transfer to [facility]. CT not that impressive but if stone is causing intermittent obstruction and can't get her pain under control.    0953 Per Michelle Zhang PA-C urology:  if can get down to [facility] we have time this afternoon for her. on SLIM.  I can come down and consent her.   0957 Spoke with PACS and patient was accepted under [facility] at [facility].       Medications   HYDROmorphone (DILAUDID) injection 0.5 mg (0.5 mg Intravenous Given 9/20/24 1135)   sodium chloride 0.9 % bolus 1,000 mL (0 mL Intravenous Stopped 9/20/24 1136)   ondansetron (ZOFRAN) injection 4 mg (4 mg Intravenous Given 9/20/24 0757)   HYDROmorphone (DILAUDID) injection 0.5 mg (0.5 mg Intravenous Given 9/20/24 0757)   HYDROmorphone (DILAUDID) injection 0.5 mg (0.5 mg Intravenous Given 9/20/24 0826)   ketorolac (TORADOL) injection 15 mg (15 mg Intravenous Given 9/20/24 0824)   HYDROmorphone (DILAUDID) injection 0.5 mg (0.5 mg Intravenous Given 9/20/24 1006)       History of Present Illness       49 y/o female patient presents to the ER for evaluation of flank pain. PMH: Type 1 diabetes, asthma, DVT/PE, osteopenia, anxiety, immunosuppression, cystic fibrosis, hypertension, ulcerative pancolitis, gastroparesis, pulmonary hypertension, stage III CKD, history of lung transplant and status post pneumonectomy with [facility].  Patient reports that she recently moved into her house 2 days ago.  Patient states that she was lifting boxes and started to have left flank pain yesterday.  Patient states that she has history of nephrolithiasis and is similar in nature to her previous episodes.  Patient states that the pain is severe and constant.  She reports that she woke up with nausea.  No noted vomiting or diarrhea.  Patient reports the pain started last night and now is radiating into her left lower quadrant.  Patient denies fever, chills.  No noted chest pain, shortness of breath, dizziness or syncope.  No noted hematuria, urgency or dysuria.  No noted ecchymosis, erythema, muscle spasm or open wound.        Review of Systems   Constitutional:  Negative for chills and fever.   HENT:  Negative for ear pain and sore throat.    Eyes:  Negative for pain and visual disturbance.    Respiratory:  Negative for cough and shortness of breath.    Cardiovascular:  Negative for chest pain and palpitations.   Gastrointestinal:  Positive for nausea. Negative for abdominal pain and vomiting.   Genitourinary:  Positive for flank pain. Negative for dysuria and hematuria.   Musculoskeletal:  Negative for arthralgias and back pain.   Skin:  Negative for color change and rash.   Neurological:  Negative for seizures and syncope.   All other systems reviewed and are negative.          Objective     ED Triage Vitals [09/20/24 0733]   Temperature Pulse Blood Pressure Respirations SpO2 Patient Position - Orthostatic VS   98.9 °F (37.2 °C) 99 (!) 177/95 20 97 % Sitting      Temp Source Heart Rate Source BP Location FiO2 (%) Pain Score    Oral Monitor Right arm -- 8        Physical Exam  Vitals and nursing note reviewed.   Constitutional:       General: She is not in acute distress.     Appearance: She is well-developed.   HENT:      Head: Normocephalic and atraumatic.      Right Ear: External ear normal.      Left Ear: External ear normal.   Eyes:      Conjunctiva/sclera: Conjunctivae normal.   Cardiovascular:      Rate and Rhythm: Normal rate and regular rhythm.      Pulses: Normal pulses.      Heart sounds: Normal heart sounds.   Pulmonary:      Effort: Pulmonary effort is normal. No respiratory distress.      Breath sounds: Normal breath sounds.   Abdominal:      Palpations: Abdomen is soft.      Tenderness: There is no abdominal tenderness. There is left CVA tenderness.   Musculoskeletal:         General: No swelling.      Cervical back: Neck supple.   Skin:     General: Skin is warm and dry.      Capillary Refill: Capillary refill takes less than 2 seconds.   Neurological:      Mental Status: She is alert and oriented to person, place, and time. Mental status is at baseline.   Psychiatric:         Mood and Affect: Mood normal.         Behavior: Behavior normal.         Labs Reviewed   UA W REFLEX TO  MICROSCOPIC WITH REFLEX TO CULTURE - Abnormal       Result Value    Color, UA Light Yellow      Clarity, UA Clear      Specific Gravity, UA 1.009      pH, UA 6.5      Leukocytes, UA Negative      Nitrite, UA Negative      Protein, UA Negative      Glucose,  (1/5%) (*)     Ketones, UA Negative      Urobilinogen, UA <2.0      Bilirubin, UA Negative      Occult Blood, UA Large (*)    CBC AND DIFFERENTIAL - Abnormal    WBC 9.14      RBC 4.25      Hemoglobin 12.5      Hematocrit 40.6      MCV 96      MCH 29.4      MCHC 30.8 (*)     RDW 14.4      MPV 10.1      Platelets 167      nRBC 0      Segmented % 67      Immature Grans % 1      Lymphocytes % 19      Monocytes % 12      Eosinophils Relative 1      Basophils Relative 0      Absolute Neutrophils 6.16      Absolute Immature Grans 0.05      Absolute Lymphocytes 1.76      Absolute Monocytes 1.08      Eosinophils Absolute 0.07      Basophils Absolute 0.02     COMPREHENSIVE METABOLIC PANEL - Abnormal    Sodium 136      Potassium 4.3      Chloride 97      CO2 33 (*)     ANION GAP 6      BUN 31 (*)     Creatinine 1.21      Glucose 144 (*)     Calcium 8.9      AST 11 (*)     ALT 18      Alkaline Phosphatase 74      Total Protein 7.0      Albumin 3.8      Total Bilirubin 0.62      eGFR 53      Narrative:     National Kidney Disease Foundation guidelines for Chronic Kidney Disease (CKD):     Stage 1 with normal or high GFR (GFR > 90 mL/min/1.73 square meters)    Stage 2 Mild CKD (GFR = 60-89 mL/min/1.73 square meters)    Stage 3A Moderate CKD (GFR = 45-59 mL/min/1.73 square meters)    Stage 3B Moderate CKD (GFR = 30-44 mL/min/1.73 square meters)    Stage 4 Severe CKD (GFR = 15-29 mL/min/1.73 square meters)    Stage 5 End Stage CKD (GFR <15 mL/min/1.73 square meters)  Note: GFR calculation is accurate only with a steady state creatinine   URINE MICROSCOPIC - Abnormal    RBC, UA Innumerable (*)     WBC, UA 2-4 (*)     Epithelial Cells None Seen      Bacteria, UA None Seen      LACTIC ACID, PLASMA (W/REFLEX IF RESULT > 2.0) - Normal    LACTIC ACID 0.9      Narrative:     Result may be elevated if tourniquet was used during collection.   POCT PREGNANCY, URINE - Normal    EXT Preg Test, Ur Negative      Control Valid       CT renal stone study abdomen pelvis wo contrast   Final Interpretation by Adiel Haskins MD ( [id number])      Left renal pelvic calculus measuring 1.5 x 0.8  x 1.2 cm. Mild fullness of the lower pole calyx.      Stool impaction in the colon.         Workstation performed: [id number]             Procedures    ED Medication and Procedure Management   Prior to Admission Medications   Prescriptions Last Dose Informant Patient Reported? Taking?   ALPRAZolam (XANAX) 1 mg tablet   No No   Sig: Take 1 tablet (1 mg total) by mouth daily at bedtime as needed for anxiety or sleep   Ascorbic Acid (vitamin C) 100 MG tablet  Self Yes No   Sig: Take 100 mg by mouth daily   BD Pen Needle Abi U/F 32G X 4 MM MISC  Self No No   Si times daily   Cholecalciferol (Vitamin D3) 125 MCG (5000 UT) TABS  Self Yes No   Sig: Take 5,000 Units by mouth daily   Continuous Blood Gluc  (Dexcom G7 ) SUKHI  Self No No   Sig: Use 1 each continuous   Continuous Glucose Sensor (Dexcom G7 Sensor)  Self No No   Sig: Use 1 Device every 10 days   Insulin Disposable Pump (Omnipod 5 G6 Intro, Gen 5,) KIT  Self No No   Sig: Use 1 Units continuous   Insulin Disposable Pump (Omnipod 5 G6 Pods, Gen 5,) MISC  Self No No   Sig: Use 1 Units every other day   Insulin Glargine-yfgn (Semglee, yfgn,) 100 UNIT/ML SOPN   No No   Sig: Inject 0.12 mL (12 Units total) under the skin every morning   Insulin Pen Needle (Comfort EZ Pen Needles) 33G X 4 MM MISC  Self No No   Sig: Use to inject insulin 4 times a day   Respiratory Therapy Supplies (Nebulizer) SUKHI  Self Yes No   Sig: Please dispense nebulizer machine   Turmeric 500 MG CAPS  Self Yes No   Sig: Take by mouth   acetaminophen (TYLENOL) 325 mg  tablet  Self No No   Sig: Take 3 tablets (975 mg total) by mouth every 8 (eight) hours   albuterol (2.5 mg/3 mL) 0.083 % nebulizer solution  Self No No   Sig: Take 6 mL (5 mg total) by nebulization 2 (two) times a day   albuterol (PROVENTIL HFA,VENTOLIN HFA) 90 mcg/act inhaler  Self Yes No   Sig: INHALE 2 PUFFS EVERY 8 HOURS AS NEEDED FOR SHORTNESS OF BREATH OR WHEEZING.   apixaban (ELIQUIS) 5 mg  Self No No   Sig: Take 1 tablet (5 mg total) by mouth 2 (two) times a day   atovaquone (MEPRON) 750 mg/5 mL suspension  Self Yes No   Sig: Take 1,500 mg by mouth daily   azithromycin (ZITHROMAX) 250 mg tablet  Self Yes No   Sig: Take 250 mg by mouth 3 (three) times a week   bisacodyl (DULCOLAX) 5 mg EC tablet  Self No No   Sig: Take 1 tablet (5 mg total) by mouth daily at bedtime   dexlansoprazole (DEXILANT) 60 MG capsule  Self No No   Sig: TAKE 1 CAPSULE BY MOUTH EVERY DAY IN THE MORNING   doxycycline hyclate (VIBRA-TABS) 100 mg tablet  Self Yes No   Sig: Take 100 mg by mouth 2 (two) times a day   Patient not taking: Reported on 2024   dronabinol (MARINOL) 5 MG capsule  Self No No   Sig: Take 1 capsule (5 mg total) by mouth 3 (three) times a day   famotidine (PEPCID) 40 MG tablet  Self No No   Sig: Take 1 tablet (40 mg total) by mouth daily at bedtime   insulin aspart (NovoLOG FlexPen) 100 UNIT/ML injection pen   No No   Sig: INJECT 10 UNITS pre-BREAKFAST, 8 UNITS pre-LUNCH and 12 UNITS pre-DINNER plus scale; max 50 UNITS/day   multivitamin (THERAGRAN) TABS  Self Yes No   Sig: Take 1 tablet by mouth daily   naloxone (NARCAN) 4 mg/0.1 mL nasal spray  Self No No   Si.1 mL (4 mg total) into each nostril every 3 (three) minutes as needed for opioid reversal or respiratory depression   nystatin (MYCOSTATIN) 500,000 units/5 mL suspension  Self No No   Sig: Apply 5 mL (500,000 Units total) to the mouth or throat 4 (four) times a day   Patient not taking: Reported on 2024   ondansetron (ZOFRAN) 4 mg tablet  Self Yes  No   Sig: Take 4 mg by mouth   Patient not taking: Reported on 8/1/2024   oxyCODONE (ROXICODONE) 10 MG TABS   No No   Sig: Take 1 to 2 tablets every 8hs as needed for severe pain. Max dose 6 tabs a day   oxybutynin (DITROPAN XL) 15 MG 24 hr tablet   No No   Sig: Take 1 tablet (15 mg total) by mouth daily at bedtime   pancrelipase, Lip-Prot-Amyl, (CREON) 24,000 units  Self Yes No   Sig: Take 48,000 Units by mouth 3 (three) times a day with meals   polyethylene glycol (MIRALAX) 17 g packet  Self No No   Sig: Take 17 g by mouth 2 (two) times a day   predniSONE 10 mg tablet  Self Yes No   Sig: Take 10 mg by mouth daily   pregabalin (LYRICA) 150 mg capsule  Self No No   Sig: Take 1 capsule (150 mg total) by mouth 2 times a day   promethazine-dextromethorphan (PHENERGAN-DM) 6.25-15 mg/5 mL oral syrup  Self No No   Sig: Take 5 mL by mouth 4 (four) times a day as needed for cough   tacrolimus (PROGRAF) 1 mg capsule  Self Yes No   Sig: Take 2.5 mg by mouth 2 (two) times a day Take 2 tablets and a 0.5 table to total 2.5 mg twice a day 5mg total daily   valACYclovir (VALTREX) 500 mg tablet  Self Yes No   Sig: Take 500 mg by mouth daily   vitamin B-12 (VITAMIN B-12) 1,000 mcg tablet  Self Yes No   Sig: Take by mouth daily      Facility-Administered Medications: None     Patient's Medications   Discharge Prescriptions    No medications on file     No discharge procedures on file.     WAYNE Jefferson  09/20/24 1107       WAYNE Jefferson  09/20/24 1212

## 2024-09-20 NOTE — ASSESSMENT & PLAN NOTE
Presented to [facility] today with left flank pain since 1 day  CT renal stone study - Left renal pelvic calculus measuring 1.5 x 0.8 x 1.2 cm. Mild fullness of the lower pole calyx.   Transferred to [facility] for urology intervention  S/p cystoscopy, ureteroscopy with lithotripsy holmium laser, retrograde pyelogram and insertion of left ureteral stent and basket stone extraction  Urology plans repeat CT in 2 weeks.  If minimal stone burden then stent will be removed but if large stone burden persistent then plan is for second stage ureteroscopy

## 2024-09-20 NOTE — TELEPHONE ENCOUNTER
Patient with large hard renal stone obstructing the lower pole associate with significant pain.  She she was offered stent placement with second stage stone surgery later date with a larger access sheath and generation to see back which would be a more definitive procedure versus attempt at stone surgery today knowing she may need a second stone surgery later date.  She wanted to attempt stone surgery today.      She had a long challenging stone surgery today in which her large hard stone was fragmented and basket extracted.  There was significant formation of lower pole right kidney which made visualization difficult.  She has a stent without string.      Plan for CT scan in 2 weeks and if there is no significant stone burden left then plan for stent removal.  If there is to be a large stone burden present then would offer a second stage stone surgery

## 2024-09-20 NOTE — CONSULTS
Consultation - Urology   Name: Maximilian Mann 48 y.o. female I MRN: [id number]  Unit/Bed#: ED 29 I Date of Admission: 9/20/2024   Date of Service: 9/20/2024 I Hospital Day: 0   Inpatient consult to Urology  Consult performed by: Michelle Zhang PA-C  Consult ordered by: WAYNE Jefferson        Physician Requesting Evaluation: Merrick Jennings MD   Reason for Evaluation / Principal Problem: left renal calculus    Assessment & Plan  Renal calculus, left  CT showing Left renal pelvic calculus measuring 1.5 x 0.8 x 1.2 cm. Mild fullness of the lower pole calyx.   VSS, afebrile  UA negative  No leukocytosis  CR 1.21 stable at baseline  Uncontrolled pain  Hx of ureteroscopy  Npo since midnight  Discussed with patient cystoscopy, L ureteroscopy, holmium laser lithotripsy, basket stone extraction, retrograde pyelogram, insertion of L ureteral stent.  Discussed risk associated with procedure including risk with anesthesia, bleeding, infection, damage to kidneys, ureter, bladder, inability to treat stone, ureteral stricture, need for delayed ureteroscopy for any signs of infection.  Discussed stent colic including frequency, urgency, hematuria, flank pain. Possible need for staged procedure  consent signed  Transfer to [facility]. Tentative OR this afternoon        Subjective:   HPI: Pau is a 48-year-old female presenting to the ED with 1 day of left flank pain.  Patient reports associated symptoms of nausea, lack of appetite.  In the ED she underwent a CT scan which showed a 1.2 x 1.5 cm left renal pelvic calculus, no hydronephrosis but mild fullness of the lower pole calyx.  Patient has had multiple rounds of IV analgesia without relief in the ED.  She reports a history of ureteroscopic interventions for stones in the past-many years since last procedure.  She is known to our practice for urinary frequency, urgency, urge incontinence.  She denies any fever, chills, urinary symptoms, hematuria, shortness of  breath, chest pain.  Patient with a past medical history of bilateral lung transplant, DM 1, asthma, chronic pain.  Patient to be transferred under [facility] to [facility].  Planning operative intervention this afternoon for ureteroscopy.  Patient was consented at bedside.    Review of Systems   Constitutional:  Negative for chills and fever.   Respiratory:  Negative for cough and shortness of breath.    Cardiovascular:  Negative for chest pain and palpitations.   Gastrointestinal:  Positive for abdominal pain and nausea. Negative for vomiting.   Genitourinary:  Positive for flank pain. Negative for dysuria and hematuria.   Musculoskeletal:  Negative for arthralgias and back pain.   Skin:  Negative for color change and rash.   Neurological:  Negative for seizures and syncope.   All other systems reviewed and are negative.      Objective:    Vitals: Blood pressure (!) 177/95, pulse 73, temperature 98.9 °F (37.2 °C), temperature source Oral, resp. rate 18, SpO2 93%, not currently breastfeeding.,There is no height or weight on file to calculate BMI.    Physical Exam  Vitals reviewed.   Constitutional:       General: She is not in acute distress.     Appearance: She is normal weight. She is not toxic-appearing.      Comments: Grimacing in pain   HENT:      Head: Normocephalic and atraumatic.      Right Ear: External ear normal.      Left Ear: External ear normal.      Nose: Nose normal.      Mouth/Throat:      Mouth: Mucous membranes are moist.   Eyes:      General: No scleral icterus.     Conjunctiva/sclera: Conjunctivae normal.   Cardiovascular:      Rate and Rhythm: Normal rate and regular rhythm.      Pulses: Normal pulses.      Heart sounds: Normal heart sounds.   Pulmonary:      Effort: Pulmonary effort is normal.      Breath sounds: Normal breath sounds. No stridor. No rhonchi.   Abdominal:      Tenderness: There is no abdominal tenderness. There is left CVA tenderness. There is no right CVA tenderness or  guarding.   Musculoskeletal:      Cervical back: Normal range of motion.   Neurological:      General: No focal deficit present.      Mental Status: She is oriented to person, place, and time. Mental status is at baseline.   Psychiatric:         Mood and Affect: Mood normal.         Behavior: Behavior normal.         Thought Content: Thought content normal.         Judgment: Judgment normal.         Imaging:  CT ABDOMEN AND PELVIS WITHOUT IV CONTRAST - LOW DOSE RENAL STONE     INDICATION: left flank pain, hx kidney stones.     COMPARISON: None.     TECHNIQUE: Low radiation dose thin section CT examination of the abdomen and pelvis was performed without intravenous or oral contrast according to a protocol specifically designed to evaluate for urinary tract calculus. Axial, sagittal, and coronal 2D   reformatted images were created from the source data and submitted for interpretation. Evaluation for pathology in the abdomen and pelvis that is unrelated to urinary tract calculi is limited.     Radiation dose length product (DLP) for this visit: 246 mGy-cm . This examination, like all CT scans performed in the [facility], was performed utilizing techniques to minimize radiation dose exposure, including the use of iterative   reconstruction and automated exposure control.     URINARY TRACT FINDINGS:     RIGHT KIDNEY AND URETER: No urinary tract calculi. No hydronephrosis or hydroureter.     LEFT KIDNEY AND URETER: Left renal pelvic calculus measuring 1.5 x 0.8 x 1.2 cm.. Mild fullness of the lower pole calyx.     URINARY BLADDER: Unremarkable.        ADDITIONAL FINDINGS:     LOWER CHEST: No clinically significant abnormality in the visualized lower chest.     SOLID VISCERA: Partially imaged liver. Spleen is enlarged measuring 12 cm. Limited low radiation dose noncontrast CT evaluation demonstrates no clinically significant abnormality of the imaged portions of the liver, spleen, pancreas, or adrenal  glands.     GALLBLADDER/BILIARY TREE: Post cholecystectomy. No biliary dilation.     STOMACH AND BOWEL: Stool throughout the colon..     APPENDIX: Surgically absent.     ABDOMINOPELVIC CAVITY: No ascites. No pneumoperitoneum. No lymphadenopathy.     VESSELS: Unremarkable for patient's age. Haziness around the celiac and superior mesenteric artery unchanged.     REPRODUCTIVE ORGANS: Unremarkable for patient's age.     ABDOMINAL WALL/INGUINAL REGIONS: Unremarkable.     BONES: No acute fracture or suspicious osseous lesion.     IMPRESSION:     Left renal pelvic calculus measuring 1.5 x 0.8  x 1.2 cm. Mild fullness of the lower pole calyx.     Stool impaction in the colon.        Workstation performed: [id number]    Labs:  Recent Labs     09/20/24  0748   WBC 9.14       Recent Labs     09/20/24  0748   HGB 12.5     Recent Labs     09/20/24  0748   HCT 40.6     Recent Labs     09/20/24  0748   CREATININE 1.21         History:    Past Medical History:   Diagnosis Date    Abnormal uterine bleeding (AUB)     ABPA (allergic bronchopulmonary aspergillosis) ([facility]) 07/08/2012    Acute kidney injury (HCC) 09/08/2017    Acute on chronic respiratory failure ([facility]) 03/04/2017    Last Assessment & Plan:  Formatting of this note might be different from the original. Recurrent with clinical worsening with enlarging PTX, s/p multiple chest tubes, but new fevers 6/19 and worsening leukocytosis indicating infection as cause of 6/19 decompensation with profound hypoxia in setting of shunting through right lung and continued pneumothorax s/p 2nd chest tube placement.  6/27: Right    Anaphylaxis 06/06/2022    Anemia     Asthma     Chest pain 09/21/2021    Chronic pain     Chronic respiratory failure with hypoxia ([facility]) 09/07/2021    COVID-19 06/03/2022    Cystic fibrosis (HCC)     Deep vein thrombosis (HCC)     Diabetes mellitus (HCC)     GERD (gastroesophageal reflux disease)     HBP (high blood pressure)     HCAP (healthcare-associated  pneumonia) 2020    History of transfusion     Influenza B 2020    Kidney stone     Neutropenic fever  (HCC) 2020    Pneumonia due to Pseudomonas species (HCC) 10/28/2010    Last Assessment & Plan:  Formatting of this note might be different from the original. Polymicrobial pna w/ pseudomonas, M. Abscessus, Cadida glabrata/albicans    PONV (postoperative nausea and vomiting)     Pseudomonas aeruginosa infection 2012    Renal calculus     Squamous cell skin cancer 2023    SCCIS- Left upper thigh    Transplant recipient     Lung- ,     Ulcerative colitis (HCC)     Wellness examination 2013    Formatting of this note might be different from the original. Discussed with patient and acknowledgement form signed on 2013 (ALYSHA)     Social History     Socioeconomic History    Marital status: /Civil Union     Spouse name: None    Number of children: None    Years of education: 12    Highest education level: None   Occupational History    Occupation: Disabled    Tobacco Use    Smoking status: Former     Current packs/day: 0.00     Average packs/day: 0.5 packs/day for 12.0 years (6.0 ttl pk-yrs)     Types: Cigarettes     Start date: 1993     Quit date: 2005     Years since quittin.7     Passive exposure: Past    Smokeless tobacco: Never   Vaping Use    Vaping status: Never Used   Substance and Sexual Activity    Alcohol use: Yes     Alcohol/week: 2.0 standard drinks of alcohol     Types: 2 Glasses of wine per week     Comment: social    Drug use: Yes     Frequency: 2.0 times per week     Types: Marijuana     Comment: medical edible marijuana prescribed    Sexual activity: Yes     Partners: Male     Birth control/protection: None   Other Topics Concern    None   Social History Narrative    Most recent tobacco use screenin2019    Do you currently or have you served in the [employer]: No    Were you activated, into active duty, as a member of the  [employer] or as a Reservist: No    Occupation: disabled    Education: 12    Marital status:     Exercise level: Occasional    Diet: Specific    low sugar, high fat    General stress level: High    Alcohol intake: Occasional    Caffeine intake: Heavy    Chewing tobacco: none    Illicit drugs: none    Guns present in home: No    Seat belts used routinely: Yes    Smoke alarm in home: Yes    Advance directive: Yes     Social Determinants of Health     Financial Resource Strain: Low Risk  (11/10/2022)    [facility]    Overall Financial Resource Strain (CARDIA)     Difficulty of Paying Living Expenses: Not hard at all   Food Insecurity: No Food Insecurity (8/17/2024)    Received from [facility]    Hunger Vital Sign     Worried About Running Out of Food in the Last Year: Never true     Ran Out of Food in the Last Year: Never true   Transportation Needs: No Transportation Needs (8/17/2024)    Received from [facility]    PRAPARE - Transportation     Lack of Transportation (Medical): No     Lack of Transportation (Non-Medical): No   Physical Activity: Insufficiently Active (1/15/2024)    Exercise Vital Sign     Days of Exercise per Week: 7 days     Minutes of Exercise per Session: 20 min   Stress: No Stress Concern Present (1/15/2024)    [employer] - Occupational Stress Questionnaire     Feeling of Stress : Only a little   Social Connections: Unknown (6/18/2024)    Received from [employer]    Social blabfeed     How often do you feel lonely or isolated from those around you? (Adult - for ages 18 years and over): Not on file   Intimate Partner Violence: Not At Risk (1/15/2024)    Humiliation, Afraid, Rape, and Kick questionnaire     Fear of Current or Ex-Partner: No     Emotionally Abused: No     Physically Abused: No     Sexually  Abused: No   Housing Stability: Low Risk  (8/7/2024)    Housing Stability Vital Sign     Unable to Pay for Housing in the Last Year: No     Number of Times Moved in the Last Year: 0     Homeless in the Last Year: No     Past Surgical History:   Procedure Laterality Date    APPENDECTOMY      AUGMENTATION MAMMAPLASTY Bilateral 2004    AUGMENTATION MAMMAPLASTY Right 2021    removed     BREAST SURGERY      Aug.     BRONCHOSCOPY      CHOLECYSTECTOMY      COLONOSCOPY      EGD  07/2019    Yeast Infect.     FL RETROGRADE PYELOGRAM  01/09/2020    FL RETROGRADE PYELOGRAM  02/04/2020    GASTROSCOPY  03/01/2024    GASTROSTOMY TUBE, PLACE      IR BALLOON-OCCLUDED ANTEGRADE TRANSVENOUS OBLITERATION (BATO)  11/22/2022    IR PORT PLACEMENT  03/22/2022    IR PORT REMOVAL  01/10/2022    IR PORT STRIPPING  11/25/2022    IR TUNNELED CENTRAL LINE PLACEMENT  01/13/2022    LUNG SURGERY      LUNG TRANSPLANT      X2    AK CYSTO BLADDER W/URETERAL CATHETERIZATION Right 01/09/2020    Procedure: CYSTOSCOPY RETROGRADE PYELOGRAM WITH INSERTION STENT URETERAL;  Surgeon: Patricio Perry MD;  Location: [facility];  Service: Urology    AK CYSTO/URETERO W/LITHOTRIPSY &INDWELL STENT INSRT Right 02/04/2020    Procedure: CYSTOSCOPY URETEROSCOPY WITH LITHOTRIPSY HOLMIUM LASER, RETROGRADE PYELOGRAM AND INSERTION STENT URETERAL;  Surgeon: Patricio Perry MD;  Location: [facility];  Service: Urology    SKIN BIOPSY      US GUIDED VASCULAR ACCESS  01/17/2017     Family History   Problem Relation Age of Onset    Alcohol abuse Mother     Mental illness Mother     Depression Mother     Alcohol abuse Father     COPD Maternal Grandmother     Lung cancer Maternal Grandmother 65    Cancer Maternal Grandmother     COPD Maternal Grandfather     Cancer Maternal Grandfather     COPD Paternal Grandmother     Lung cancer Paternal Grandmother 65    No Known Problems Sister     No Known Problems Paternal Aunt     Breast cancer Neg Hx     Ovarian cancer Neg Hx      Colon cancer Neg Hx        Michelle Zhang PA-C  Date: 9/20/2024 Time: 11:02 AM

## 2024-09-20 NOTE — ASSESSMENT & PLAN NOTE
CT showing Left renal pelvic calculus measuring 1.5 x 0.8 x 1.2 cm. Mild fullness of the lower pole calyx.   VSS, afebrile  UA negative  No leukocytosis  CR 1.21 stable at baseline  Uncontrolled pain  Hx of ureteroscopy  Npo since midnight  Discussed with patient cystoscopy, L ureteroscopy, holmium laser lithotripsy, basket stone extraction, retrograde pyelogram, insertion of L ureteral stent.  Discussed risk associated with procedure including risk with anesthesia, bleeding, infection, damage to kidneys, ureter, bladder, inability to treat stone, ureteral stricture, need for delayed ureteroscopy for any signs of infection.  Discussed stent colic including frequency, urgency, hematuria, flank pain. Possible need for staged procedure  consent signed  Transfer to [facility]. Tentative OR this afternoon

## 2024-09-20 NOTE — ASSESSMENT & PLAN NOTE
H/o cystic fibrosis s/p bilateral lung transplant in 2013 and left lung transplant on 3/1/76808  S/p right pneumonectomy  Ct home regimen - Prednisone 15 mg daily(being tapered down to 10 mg), Tacrolimus 3.5 mg in the morning and 3 mg in the evening, Atovaquone 1500 mg daily, Azithromycin 250 mg 3 times weekly on M/W/F, Valacyclovir 500 mg daily  Transplant center at [place] updated with current status at # [phone] - goal Tacrolimus trough level 6-8  Follow-up Tacrolimus level

## 2024-09-21 LAB
ANION GAP SERPL CALCULATED.3IONS-SCNC: 6 MMOL/L (ref 4–13)
BASOPHILS # BLD AUTO: 0.01 THOUSANDS/ΜL (ref 0–0.1)
BASOPHILS NFR BLD AUTO: 0 % (ref 0–1)
BUN SERPL-MCNC: 26 MG/DL (ref 5–25)
CALCIUM SERPL-MCNC: 8.6 MG/DL (ref 8.4–10.2)
CHLORIDE SERPL-SCNC: 100 MMOL/L (ref 96–108)
CO2 SERPL-SCNC: 29 MMOL/L (ref 21–32)
CREAT SERPL-MCNC: 1.08 MG/DL (ref 0.6–1.3)
EOSINOPHIL # BLD AUTO: 0.02 THOUSAND/ΜL (ref 0–0.61)
EOSINOPHIL NFR BLD AUTO: 0 % (ref 0–6)
ERYTHROCYTE [DISTWIDTH] IN BLOOD BY AUTOMATED COUNT: 14.6 % (ref 11.6–15.1)
EST. AVERAGE GLUCOSE BLD GHB EST-MCNC: 169 MG/DL
GFR SERPL CREATININE-BSD FRML MDRD: 61 ML/MIN/1.73SQ M
GLUCOSE SERPL-MCNC: 123 MG/DL (ref 65–140)
GLUCOSE SERPL-MCNC: 153 MG/DL (ref 65–140)
GLUCOSE SERPL-MCNC: 187 MG/DL (ref 65–140)
GLUCOSE SERPL-MCNC: 264 MG/DL (ref 65–140)
GLUCOSE SERPL-MCNC: 317 MG/DL (ref 65–140)
HBA1C MFR BLD: 7.5 %
HCT VFR BLD AUTO: 39.4 % (ref 34.8–46.1)
HGB BLD-MCNC: 11.9 G/DL (ref 11.5–15.4)
IMM GRANULOCYTES # BLD AUTO: 0.03 THOUSAND/UL (ref 0–0.2)
IMM GRANULOCYTES NFR BLD AUTO: 0 % (ref 0–2)
LYMPHOCYTES # BLD AUTO: 1.28 THOUSANDS/ΜL (ref 0.6–4.47)
LYMPHOCYTES NFR BLD AUTO: 15 % (ref 14–44)
MAGNESIUM SERPL-MCNC: 1.8 MG/DL (ref 1.9–2.7)
MCH RBC QN AUTO: 29.2 PG (ref 26.8–34.3)
MCHC RBC AUTO-ENTMCNC: 30.2 G/DL (ref 31.4–37.4)
MCV RBC AUTO: 97 FL (ref 82–98)
MONOCYTES # BLD AUTO: 0.8 THOUSAND/ΜL (ref 0.17–1.22)
MONOCYTES NFR BLD AUTO: 9 % (ref 4–12)
NEUTROPHILS # BLD AUTO: 6.43 THOUSANDS/ΜL (ref 1.85–7.62)
NEUTS SEG NFR BLD AUTO: 76 % (ref 43–75)
NRBC BLD AUTO-RTO: 0 /100 WBCS
PLATELET # BLD AUTO: 147 THOUSANDS/UL (ref 149–390)
PMV BLD AUTO: 9.9 FL (ref 8.9–12.7)
POTASSIUM SERPL-SCNC: 4.9 MMOL/L (ref 3.5–5.3)
RBC # BLD AUTO: 4.08 MILLION/UL (ref 3.81–5.12)
SODIUM SERPL-SCNC: 135 MMOL/L (ref 135–147)
TACROLIMUS BLD-MCNC: 17.6 NG/ML (ref 3–15)
WBC # BLD AUTO: 8.57 THOUSAND/UL (ref 4.31–10.16)

## 2024-09-21 PROCEDURE — 83735 ASSAY OF MAGNESIUM: CPT | Performed by: INTERNAL MEDICINE

## 2024-09-21 PROCEDURE — 80048 BASIC METABOLIC PNL TOTAL CA: CPT | Performed by: INTERNAL MEDICINE

## 2024-09-21 PROCEDURE — 85025 COMPLETE CBC W/AUTO DIFF WBC: CPT | Performed by: INTERNAL MEDICINE

## 2024-09-21 PROCEDURE — 82948 REAGENT STRIP/BLOOD GLUCOSE: CPT

## 2024-09-21 PROCEDURE — 83036 HEMOGLOBIN GLYCOSYLATED A1C: CPT | Performed by: INTERNAL MEDICINE

## 2024-09-21 PROCEDURE — 99232 SBSQ HOSP IP/OBS MODERATE 35: CPT | Performed by: FAMILY MEDICINE

## 2024-09-21 PROCEDURE — 99232 SBSQ HOSP IP/OBS MODERATE 35: CPT | Performed by: UROLOGY

## 2024-09-21 RX ORDER — TAMSULOSIN HYDROCHLORIDE 0.4 MG/1
0.4 CAPSULE ORAL
Status: DISCONTINUED | OUTPATIENT
Start: 2024-09-21 | End: 2024-09-26 | Stop reason: HOSPADM

## 2024-09-21 RX ORDER — TACROLIMUS 1 MG/1
2 CAPSULE ORAL EVERY 12 HOURS SCHEDULED
Status: DISCONTINUED | OUTPATIENT
Start: 2024-09-22 | End: 2024-09-26 | Stop reason: HOSPADM

## 2024-09-21 RX ORDER — HYDROMORPHONE HCL/PF 1 MG/ML
0.5 SYRINGE (ML) INJECTION ONCE
Status: COMPLETED | OUTPATIENT
Start: 2024-09-21 | End: 2024-09-21

## 2024-09-21 RX ORDER — HYDROMORPHONE HCL/PF 1 MG/ML
1 SYRINGE (ML) INJECTION EVERY 4 HOURS PRN
Status: DISCONTINUED | OUTPATIENT
Start: 2024-09-21 | End: 2024-09-25

## 2024-09-21 RX ORDER — PHENAZOPYRIDINE HYDROCHLORIDE 100 MG/1
100 TABLET, FILM COATED ORAL
Status: DISCONTINUED | OUTPATIENT
Start: 2024-09-21 | End: 2024-09-26 | Stop reason: HOSPADM

## 2024-09-21 RX ADMIN — OXYBUTYNIN CHLORIDE 5 MG: 5 TABLET ORAL at 20:00

## 2024-09-21 RX ADMIN — INSULIN GLARGINE 12 UNITS: 100 INJECTION, SOLUTION SUBCUTANEOUS at 08:14

## 2024-09-21 RX ADMIN — APIXABAN 5 MG: 5 TABLET, FILM COATED ORAL at 17:16

## 2024-09-21 RX ADMIN — BUDESONIDE AND FORMOTEROL FUMARATE DIHYDRATE 2 PUFF: 160; 4.5 AEROSOL RESPIRATORY (INHALATION) at 08:10

## 2024-09-21 RX ADMIN — HYDROMORPHONE HYDROCHLORIDE 0.5 MG: 1 INJECTION, SOLUTION INTRAMUSCULAR; INTRAVENOUS; SUBCUTANEOUS at 06:36

## 2024-09-21 RX ADMIN — INSULIN LISPRO 2 UNITS: 100 INJECTION, SOLUTION INTRAVENOUS; SUBCUTANEOUS at 12:07

## 2024-09-21 RX ADMIN — VALACYCLOVIR HYDROCHLORIDE 500 MG: 500 TABLET, FILM COATED ORAL at 08:15

## 2024-09-21 RX ADMIN — OXYCODONE HYDROCHLORIDE 10 MG: 10 TABLET ORAL at 18:10

## 2024-09-21 RX ADMIN — HYDROMORPHONE HYDROCHLORIDE 0.5 MG: 1 INJECTION, SOLUTION INTRAMUSCULAR; INTRAVENOUS; SUBCUTANEOUS at 10:56

## 2024-09-21 RX ADMIN — ACETAMINOPHEN 650 MG: 325 TABLET ORAL at 08:16

## 2024-09-21 RX ADMIN — PREGABALIN 150 MG: 75 CAPSULE ORAL at 17:16

## 2024-09-21 RX ADMIN — Medication 125 MG: at 08:09

## 2024-09-21 RX ADMIN — PANCRELIPASE 48000 UNITS: 120000; 24000; 76000 CAPSULE, DELAYED RELEASE PELLETS ORAL at 08:04

## 2024-09-21 RX ADMIN — TAMSULOSIN HYDROCHLORIDE 0.4 MG: 0.4 CAPSULE ORAL at 17:16

## 2024-09-21 RX ADMIN — HYDROMORPHONE HYDROCHLORIDE 1 MG: 1 INJECTION, SOLUTION INTRAMUSCULAR; INTRAVENOUS; SUBCUTANEOUS at 20:00

## 2024-09-21 RX ADMIN — BUDESONIDE AND FORMOTEROL FUMARATE DIHYDRATE 2 PUFF: 160; 4.5 AEROSOL RESPIRATORY (INHALATION) at 17:16

## 2024-09-21 RX ADMIN — ACETAMINOPHEN 650 MG: 325 TABLET ORAL at 22:23

## 2024-09-21 RX ADMIN — PANCRELIPASE 48000 UNITS: 120000; 24000; 76000 CAPSULE, DELAYED RELEASE PELLETS ORAL at 12:07

## 2024-09-21 RX ADMIN — OXYCODONE HYDROCHLORIDE 10 MG: 10 TABLET ORAL at 04:34

## 2024-09-21 RX ADMIN — HYDROMORPHONE HYDROCHLORIDE 0.5 MG: 1 INJECTION, SOLUTION INTRAMUSCULAR; INTRAVENOUS; SUBCUTANEOUS at 13:58

## 2024-09-21 RX ADMIN — OXYCODONE HYDROCHLORIDE 10 MG: 10 TABLET ORAL at 13:51

## 2024-09-21 RX ADMIN — INSULIN LISPRO 2 UNITS: 100 INJECTION, SOLUTION INTRAVENOUS; SUBCUTANEOUS at 17:16

## 2024-09-21 RX ADMIN — OXYBUTYNIN CHLORIDE 5 MG: 5 TABLET ORAL at 14:57

## 2024-09-21 RX ADMIN — PANTOPRAZOLE SODIUM 40 MG: 40 TABLET, DELAYED RELEASE ORAL at 04:33

## 2024-09-21 RX ADMIN — MAGNESIUM OXIDE TAB 400 MG (241.3 MG ELEMENTAL MG) 400 MG: 400 (241.3 MG) TAB at 08:05

## 2024-09-21 RX ADMIN — INSULIN LISPRO 3 UNITS: 100 INJECTION, SOLUTION INTRAVENOUS; SUBCUTANEOUS at 17:16

## 2024-09-21 RX ADMIN — OXYCODONE HYDROCHLORIDE 10 MG: 10 TABLET ORAL at 09:26

## 2024-09-21 RX ADMIN — PREGABALIN 150 MG: 75 CAPSULE ORAL at 08:05

## 2024-09-21 RX ADMIN — PHENAZOPYRIDINE HYDROCHLORIDE 100 MG: 100 TABLET ORAL at 17:17

## 2024-09-21 RX ADMIN — HYDROMORPHONE HYDROCHLORIDE 0.5 MG: 1 INJECTION, SOLUTION INTRAMUSCULAR; INTRAVENOUS; SUBCUTANEOUS at 14:54

## 2024-09-21 RX ADMIN — FUROSEMIDE 40 MG: 40 TABLET ORAL at 08:05

## 2024-09-21 RX ADMIN — OXYBUTYNIN CHLORIDE 5 MG: 5 TABLET ORAL at 08:05

## 2024-09-21 RX ADMIN — HYDROMORPHONE HYDROCHLORIDE 1 MG: 1 INJECTION, SOLUTION INTRAMUSCULAR; INTRAVENOUS; SUBCUTANEOUS at 16:03

## 2024-09-21 RX ADMIN — INSULIN LISPRO 2 UNITS: 100 INJECTION, SOLUTION INTRAVENOUS; SUBCUTANEOUS at 08:04

## 2024-09-21 RX ADMIN — PREDNISONE 15 MG: 5 TABLET ORAL at 08:05

## 2024-09-21 RX ADMIN — CYANOCOBALAMIN TAB 500 MCG 1000 MCG: 500 TAB at 08:04

## 2024-09-21 RX ADMIN — PANCRELIPASE 48000 UNITS: 120000; 24000; 76000 CAPSULE, DELAYED RELEASE PELLETS ORAL at 17:16

## 2024-09-21 RX ADMIN — TACROLIMUS 3.5 MG: 0.5 CAPSULE ORAL at 08:09

## 2024-09-21 RX ADMIN — OXYCODONE HYDROCHLORIDE 10 MG: 10 TABLET ORAL at 22:18

## 2024-09-21 RX ADMIN — Medication 5000 UNITS: at 08:08

## 2024-09-21 RX ADMIN — FAMOTIDINE 40 MG: 20 TABLET, FILM COATED ORAL at 21:41

## 2024-09-21 RX ADMIN — APIXABAN 5 MG: 5 TABLET, FILM COATED ORAL at 08:05

## 2024-09-21 RX ADMIN — INSULIN LISPRO 1 UNITS: 100 INJECTION, SOLUTION INTRAVENOUS; SUBCUTANEOUS at 21:42

## 2024-09-21 RX ADMIN — ATOVAQUONE 1500 MG: 750 SUSPENSION ORAL at 08:08

## 2024-09-21 NOTE — ASSESSMENT & PLAN NOTE
Lab Results   Component Value Date    HGBA1C 7.5 (H) 09/21/2024       Recent Labs     09/20/24  1845 09/20/24 2052 09/21/24  0803 09/21/24  1204   POCGLU 67 146* 123 264*       Blood Sugar Average: Last 72 hrs:  (P) 135.6Home regimen: Lantus 12 units in am, Aspart 6 units TID  Drop in blood sugar to 67 this evening  Ct Lantus 12 units in am, add Humalog at 2 units TID and sliding scale insulin  Monitor blood sugars and adjust insulin accordingly

## 2024-09-21 NOTE — ASSESSMENT & PLAN NOTE
Sore throat since 1 day  Patient was concerned for strep throat  No fever  Strep A PCR - not detected  Symptomatic treatment

## 2024-09-21 NOTE — PROGRESS NOTES
Progress Note - Urology   Name: Maximilian Mann 48 y.o. female I MRN: [id number]  [facility] Date of Admission: 9/20/2024   Date of Service: 9/21/2024 I Hospital Day: 1     Assessment & Plan  Renal calculus, left  Postop day 1 cystoscopy ureteroscopy, laser lithotripsy basket traction ureteral stent insertion on the left.  -Patient will follow-up with urology in 2 weeks for repeat CT scan if large stone burden will require second definitive stone treatment.  If low stone burden we will plan for stent removal.  -Discussed with patient what to expect postoperative with ureteral stent including frequency, urgency, flank pain with urination, burning with urination and intermittent hematuria as long as ureteral stent is in place.  -May provide Flomax, oxybutynin, Pyridium renal function permitting as needed for any stent colic.  -Patient cleared from urologic standpoint once medically clear from medical standpoint.  Type 1 diabetes mellitus with diabetic chronic kidney disease, unspecified CKD stage (HCC)  Lab Results   Component Value Date    HGBA1C 7.5 (H) 09/21/2024       Recent Labs     09/20/24  1557 09/20/24  1845 09/20/24  2052 09/21/24  0803   POCGLU 78 67 146* 123       Blood Sugar Average: Last 72 hrs:  (P) 103.5    Pancreatic insufficiency  Per primary  Asthma  Per primary  GERD (gastroesophageal reflux disease)  Per primary  Immunosuppression (HCC)  Per primary  Stage 3a chronic kidney disease (HCC)  Lab Results   Component Value Date    EGFR 61 09/21/2024    EGFR 53 09/20/2024    EGFR 54 (L) 08/16/2024    CREATININE 1.08 09/21/2024    CREATININE 1.21 09/20/2024    CREATININE 1.15 (H) 08/18/2024     Hx of lung transplant (HCC)  Per primary  Continuous opioid dependence (HCC)  Per primary  History of pulmonary embolus (PE)  Per primary  History of DVT (deep vein thrombosis)  Per primary  Sore throat  Per primary    Urology service signing off.    History of Present Illness   Patient  sitting comfortably in bed no acute distress denies any nausea vomiting fevers chills.  Reports to still be some tenderness.    Objective      Temp:  [97.6 °F (36.4 °C)-100 °F (37.8 °C)] 98.5 °F (36.9 °C)  HR:  [59-82] 82  Resp:  [14-25] 25  BP: (105-165)/(67-98) 154/94  O2 Device: None (Room air)          I/O         09/19 0701  09/20 0700 09/20 0701  09/21 0700 09/21 0701  09/22 0700    P.O.  720     I.V. (mL/kg)  500 (8.8)     Total Intake(mL/kg)  1220 (21.6)     Urine (mL/kg/hr)  1375     Stool  0     Total Output  1375     Net  -155            Unmeasured Stool Occurrence  0 x           Lines/Drains/Airways       Active Status       Name Placement date Placement time Site Days    Port A Cath 11/25/22 Right Chest 11/25/22  0953  Chest  665    Port A Cath 11/25/22 Right Chest 11/25/22  1201  Chest  665                  Physical Exam  Constitutional:       General: She is not in acute distress.     Appearance: She is normal weight. She is not ill-appearing, toxic-appearing or diaphoretic.   HENT:      Head: Normocephalic and atraumatic.      Right Ear: External ear normal.      Left Ear: External ear normal.      Nose: Nose normal.      Mouth/Throat:      Pharynx: Oropharynx is clear.   Eyes:      General: No scleral icterus.     Conjunctiva/sclera: Conjunctivae normal.   Cardiovascular:      Rate and Rhythm: Normal rate.      Heart sounds: No murmur heard.     No friction rub. No gallop.   Pulmonary:      Effort: Pulmonary effort is normal. No respiratory distress.      Breath sounds: No wheezing, rhonchi or rales.   Abdominal:      General: Bowel sounds are normal. There is no distension.      Tenderness: There is no abdominal tenderness.   Musculoskeletal:      Cervical back: Normal range of motion.   Neurological:      General: No focal deficit present.      Mental Status: She is alert and oriented to person, place, and time.          Lab Results: I have reviewed the following results:     Lab Results    Component Value Date    WBC 8.57 09/21/2024    HGB 11.9 09/21/2024    HCT 39.4 09/21/2024    MCV 97 09/21/2024     (L) 09/21/2024     Lab Results   Component Value Date    SODIUM 135 09/21/2024    K 4.9 09/21/2024     09/21/2024    CO2 29 09/21/2024    BUN 26 (H) 09/21/2024    CREATININE 1.08 09/21/2024    GLUC 153 (H) 09/21/2024    CALCIUM 8.6 09/21/2024       Imaging Review:   VTE Pharmacologic Prophylaxis: Reason for no pharmacologic prophylaxis per primary team  VTE Mechanical Prophylaxis: sequential compression device    Thalia Fabian PA-C

## 2024-09-21 NOTE — ASSESSMENT & PLAN NOTE
Lab Results   Component Value Date    EGFR 61 09/21/2024    EGFR 53 09/20/2024    EGFR 54 (L) 08/16/2024    CREATININE 1.08 09/21/2024    CREATININE 1.21 09/20/2024    CREATININE 1.15 (H) 08/18/2024

## 2024-09-21 NOTE — UTILIZATION REVIEW
Initial Clinical Review    Admission: Date/Time/Statement:   Admission Orders (From admission, onward)       Ordered        09/20/24 1749  Inpatient Admission  Once                          Orders Placed This Encounter   Procedures    Inpatient Admission     Standing Status:   Standing     Number of Occurrences:   1     Order Specific Question:   Level of Care     Answer:   Med Surg [16]     Order Specific Question:   Estimated length of stay     Answer:   More than 2 Midnights     Order Specific Question:   Certification     Answer:   I certify that inpatient services are medically necessary for this patient for a duration of greater than two midnights. See H&P and MD Progress Notes for additional information about the patient's course of treatment.     Initial Presentation: 48 y.o. female with PMHx includes lung transplant, type 1 diabetes, CKD 3, GERD, DVT, PE, asthma, cystic fibrosis, pancreatic insufficiency, chronic sinusitis, gastroparesis, status post right pneumonectomy, who presented initially to [facility] then transferred to [facility], admitted Inpatient status dt Left Renal calculus.  Presented due to left flank pain.  She also complains of a sore throat since last night and has chronic dry cough.  CT renal stone study revealed left renal pelvic calculus measuring 1.5 x 0.8 x 1.2 cm. Mild fullness of the lower pole calyx.  Transferred to [facility] for urology intervention.    Plan:  Admit to med surg :  Urology consult. Hx of lung transplant, FU on Tacrolimus level. Start accuchecks w/ SSI, pain control. IO, SCDs.     Anticipated Length of Stay/Certification Statement: Patient will be admitted on an inpatient basis with an anticipated length of stay of greater than 2 midnights secondary to left renal calculus s/p stent with severe pain..     9/20 Per Urology: It appears the patient's [facility] transplant team is comfortable with her having surgery at [facility] as she has had  before. Plan for transfer and I will discuss with her attempting ureteroscopy with lithotripsy but knowing this may require more than 1 surgery given her large stone burden versus placing a stent and attempting to treat all of her stone at a future date when we have the benefit of a larger access sheath and CVAC generation 2.    9/20 OP Note:  Procedure(s):  Left - CYSTOSCOPY URETEROSCOPY WITH LITHOTRIPSY HOLMIUM LASER. RETROGRADE PYELOGRAM AND INSERTION STENT URETERAL. BASKET STONE EXTRACTION  Anesthesia Type: General  Operative Indications:  Recurrent nephrolithiasis as well as cystic fibrosis and bilateral lung transplant who presented with severe left-sided flank pain with imaging showing a 1.5 cm left renal stone with lower pole hydronephrosis.    Operative Findings:  Hard brown renal stone obstructing the left renal pelvis and lower pole  Aptra one-time use digital flexible ureteroscope used given patient's large hard stone  Significant volume of inflammatory tissue and debris in the lower pole that was evacuated after stone was partially fragmented.  Large stone took significant amount of energy and time to fragment and basket extract.  Retrograde pyelogram end of procedure looked unremarkable     Date: 9/21   Day 2:  Per Urology: Reports to still be some tenderness. May provide Flomax, oxybutynin, Pyridium renal function permitting as needed for any stent colic. FU with Urology in 2 weeks for repeat CT scan and possible stent removal.     ED Triage Vitals   Temperature Pulse Respirations Blood Pressure SpO2 Pain Score   09/20/24 1553 09/20/24 1553 09/20/24 1553 09/20/24 1553 09/20/24 1553 09/20/24 1425   97.6 °F (36.4 °C) 59 16 143/73 100 % 8     Weight (last 2 days)       Date/Time Weight    09/20/24 22:16:20 56.6 (124.78)    09/20/24 17:19:56 56.6 (124.78)            Vital Signs (last 3 days)       Date/Time Temp Pulse Resp BP MAP (mmHg) SpO2 Calculated FIO2 (%) - Nasal Cannula O2 Flow Rate (L/min) Nasal  Cannula O2 Flow Rate (L/min) O2 Device Cardiac (WDL) Patient Position - Orthostatic VS Batsheva Coma Scale Score Pain    09/21/24 0816 -- -- -- -- -- -- -- -- -- -- -- -- -- 5    09/21/24 08:07:31 98.5 °F (36.9 °C) 82 25 154/94 114 99 % -- -- -- -- -- -- -- --    09/21/24 0636 -- -- -- -- -- -- -- -- -- -- -- -- -- 8 09/21/24 0434 -- -- -- -- -- -- -- -- -- -- -- -- -- 9 09/20/24 2249 -- -- -- -- -- -- -- -- -- -- -- -- -- 8 09/20/24 22:16:20 100 °F (37.8 °C) 77 14 130/77 95 95 % -- -- -- None (Room air) -- Lying -- --    09/20/24 2200 -- -- -- -- -- -- -- -- -- -- -- -- 15 --    09/20/24 1951 -- -- -- -- -- -- -- -- -- -- -- -- -- 7 09/20/24 1854 -- -- -- -- -- -- 28 2 L/min 2 L/min Nasal cannula -- -- -- --    09/20/24 1849 -- -- -- -- -- -- -- -- -- -- -- -- -- 7 09/20/24 1733 -- -- -- -- -- -- -- -- -- -- -- -- -- 10 - Worst Possible Pain    09/20/24 17:19:56 98.4 °F (36.9 °C) 74 18 165/98 120 100 % -- -- -- Nasal cannula -- Lying -- --    09/20/24 1630 98 °F (36.7 °C) 67 19 162/91 117 100 % 28 -- 2 L/min Nasal cannula WDL -- 15 --    09/20/24 1620 -- 68 18 -- -- 100 % 28 -- 2 L/min Nasal cannula -- -- -- 7    09/20/24 1615 -- 66 14 152/85 110 100 % -- -- -- None (Room air) -- -- -- 8    09/20/24 1600 -- 62 22 154/89 114 100 % -- 6 L/min -- Simple mask -- -- -- --    09/20/24 1553 97.6 °F (36.4 °C) 59 16 143/73 99 100 % -- 6 L/min -- Simple mask WDL -- -- --    09/20/24 1425 -- -- -- -- -- -- -- -- -- None (Room air) -- -- -- 8              Pertinent Labs/Diagnostic Test Results:   Radiology:  FL retrograde pyelogram   Final Interpretation by Shadi Boyle MD (09/21 3547)      Fluoroscopic guidance provided for left retrograde pyelogram.      Please see separate procedure report for additional details.         Workstation performed: [id number]           Cardiology:  No orders to display     GI:  No orders to display           Results from last 7 days   Lab Units 09/21/24  2635 09/20/24  3444    WBC Thousand/uL 8.57 9.14   HEMOGLOBIN g/dL 11.9 12.5   HEMATOCRIT % 39.4 40.6   PLATELETS Thousands/uL 147* 167   TOTAL NEUT ABS Thousands/µL 6.43 6.16         Results from last 7 days   Lab Units 09/21/24  0435 09/20/24  0748   SODIUM mmol/L 135 136   POTASSIUM mmol/L 4.9 4.3   CHLORIDE mmol/L 100 97   CO2 mmol/L 29 33*   ANION GAP mmol/L 6 6   BUN mg/dL 26* 31*   CREATININE mg/dL 1.08 1.21   EGFR ml/min/1.73sq m 61 53   CALCIUM mg/dL 8.6 8.9   MAGNESIUM mg/dL 1.8*  --      Results from last 7 days   Lab Units 09/20/24  0748   AST U/L 11*   ALT U/L 18   ALK PHOS U/L 74   TOTAL PROTEIN g/dL 7.0   ALBUMIN g/dL 3.8   TOTAL BILIRUBIN mg/dL 0.62     Results from last 7 days   Lab Units 09/21/24  0803 09/20/24  2052 09/20/24  1845 09/20/24  1557   POC GLUCOSE mg/dl 123 146* 67 78     Results from last 7 days   Lab Units 09/21/24  0435 09/20/24  0748   GLUCOSE RANDOM mg/dL 153* 144*         Results from last 7 days   Lab Units 09/21/24  0435   HEMOGLOBIN A1C % 7.5*   EAG mg/dl 169     Beta- Hydroxybutyrate   Date Value Ref Range Status   05/21/2024 0.06 0.02 - 0.27 mmol/L Final      Results from last 7 days   Lab Units 09/20/24  0748   LACTIC ACID mmol/L 0.9     Results from last 7 days   Lab Units 09/20/24  0748   CLARITY UA  Clear   COLOR UA  Light Yellow   SPEC GRAV UA  1.009   PH UA  6.5   GLUCOSE UA mg/dl 200 (1/5%)*   KETONES UA mg/dl Negative   BLOOD UA  Large*   PROTEIN UA mg/dl Negative   NITRITE UA  Negative   BILIRUBIN UA  Negative   UROBILINOGEN UA (BE) mg/dl <2.0   LEUKOCYTES UA  Negative   WBC UA /hpf 2-4*   RBC UA /hpf Innumerable*   BACTERIA UA /hpf None Seen   EPITHELIAL CELLS WET PREP /hpf None Seen     Past Medical History:   Diagnosis Date    Abnormal uterine bleeding (AUB)     ABPA (allergic bronchopulmonary aspergillosis) ([facility]) 07/08/2012    Acute kidney injury ([facility]) 09/08/2017    Acute on chronic respiratory failure ([facility]) 03/04/2017    Last Assessment & Plan:  Formatting of this note might be  different from the original. Recurrent with clinical worsening with enlarging PTX, s/p multiple chest tubes, but new fevers 6/19 and worsening leukocytosis indicating infection as cause of 6/19 decompensation with profound hypoxia in setting of shunting through right lung and continued pneumothorax s/p 2nd chest tube placement.  6/27: Right    Anaphylaxis 06/06/2022    Anemia     Asthma     Chest pain 09/21/2021    Chronic pain     Chronic respiratory failure with hypoxia (HCC) 09/07/2021    COVID-19 06/03/2022    Cystic fibrosis (HCC)     Deep vein thrombosis (HCC)     Diabetes mellitus (HCC)     GERD (gastroesophageal reflux disease)     HBP (high blood pressure)     HCAP (healthcare-associated pneumonia) 03/06/2020    History of transfusion     Influenza B 02/23/2020    Kidney stone     Neutropenic fever  (HCC) 09/26/2020    Pneumonia due to Pseudomonas species ([facility]) 10/28/2010    Last Assessment & Plan:  Formatting of this note might be different from the original. Polymicrobial pna w/ pseudomonas, M. Abscessus, Cadida glabrata/albicans    PONV (postoperative nausea and vomiting)     Pseudomonas aeruginosa infection 09/20/2012    Renal calculus     Squamous cell skin cancer 01/12/2023    SCCIS- Left upper thigh    Transplant recipient     Lung- 2013, 2017    Ulcerative colitis ([facility])     Wellness examination 02/04/2013    Formatting of this note might be different from the original. Discussed with patient and acknowledgement form signed on 2/4/2013 (ALYSHA)     Present on Admission:   Renal calculus, left   Type 1 diabetes mellitus with diabetic chronic kidney disease, unspecified CKD stage (HCC)   Immunosuppression (HCC)   Asthma   Stage 3a chronic kidney disease (HCC)   GERD (gastroesophageal reflux disease)   Continuous opioid dependence (HCC)   Pancreatic insufficiency      Admitting Diagnosis: Nephrolithiasis [N20.0]  Age/Sex: 48 y.o. female  Admission Orders:  Scheduled Medications:  apixaban, 5 mg, Oral,  BID  ascorbic acid, 125 mg, Oral, Daily  atovaquone, 1,500 mg, Oral, Daily  azithromycin, 250 mg, Oral, Once per day on Monday Wednesday Friday  budesonide-formoterol, 2 puff, Inhalation, BID  cholecalciferol, 5,000 Units, Oral, Daily  vitamin B-12, 1,000 mcg, Oral, Daily  famotidine, 40 mg, Oral, HS  furosemide, 40 mg, Oral, Daily  insulin glargine, 12 Units, Subcutaneous, QAM  insulin lispro, 1-5 Units, Subcutaneous, TID AC  insulin lispro, 1-5 Units, Subcutaneous, HS  insulin lispro, 2 Units, Subcutaneous, TID With Meals  magnesium Oxide, 400 mg, Oral, Daily  oxybutynin, 5 mg, Oral, TID  pancrelipase (Lip-Prot-Amyl), 48,000 Units, Oral, TID With Meals  pantoprazole, 40 mg, Oral, Early Morning  predniSONE, 15 mg, Oral, Daily  pregabalin, 150 mg, Oral, BID  tacrolimus, 3 mg, Oral, QPM  tacrolimus, 3.5 mg, Oral, Daily  valACYclovir, 500 mg, Oral, Daily      Continuous IV Infusions: none     PRN Meds:  acetaminophen, 650 mg, Oral, Q6H PRN  albuterol, 2 puff, Inhalation, Q4H PRN  ALPRAZolam, 1 mg, Oral, HS PRN  dextromethorphan-guaiFENesin, 5 mL, Oral, Q6H PRN  HYDROmorphone, 0.5 mg, Intravenous, Q2H PRN x3  ondansetron, 4 mg, Oral, Q6H PRN x1  oxyCODONE, 5 mg, Oral, Q4H PRN   Or  oxyCODONE, 10 mg, Oral, Q4H PRN x3        None    Network Utilization Review Department  ATTENTION: Please call with any questions or concerns to [phone] and carefully listen to the prompts so that you are directed to the right person. All voicemails are confidential.   For Discharge needs, contact Care Management DC Support Team at [phone] opt. [phone]  Send all requests for admission clinical reviews, approved or denied determinations and any other requests to dedicated fax number below belonging to the campus where the patient is receiving treatment. List of dedicated fax numbers for the Facilities:  FACILITY NAME UR FAX NUMBER   ADMISSION DENIALS (Administrative/Medical Necessity) [phone]   DISCHARGE SUPPORT TEAM (NETWORK)  [phone]   PARENT CHILD HEALTH (Maternity/NICU/Pediatrics) [phone]   [facility] [phone]   [facility] [phone]   [facility] [phone]   [facility] [phone]   [facility] [phone]   [facility] [phone]   [facility] [phone]   [facility] [phone]   [facility] [phone]   [facility] [phone]   [facility] [phone]   [facility] [phone]

## 2024-09-21 NOTE — ASSESSMENT & PLAN NOTE
/Presented to [facility] today with left flank pain since 1 day  /CT renal stone study - Left renal pelvic calculus measuring 1.5 x 0.8 x 1.2 cm. Mild fullness of the lower pole calyx.   [facility]  S/p cystoscopy, ureteroscopy with lithotripsy holmium laser, retrograde pyelogram and insertion of left ureteral stent and basket stone extraction 9/20/24  Urology plans repeat CT in 2 weeks.  If minimal stone burden then stent will be removed but if large stone burden persistent then plan is for second stage ureteroscopy  Currently complaining of worsening pain, increased pelvic pressure and noel hematuria, likely passing a stone.   Start Flomax, pyridium. Strain all urine. Bladder scan.  Hold Eliquis for noel hematuria

## 2024-09-21 NOTE — ASSESSMENT & PLAN NOTE
H/o cystic fibrosis s/p bilateral lung transplant in 2013 and left lung transplant on 3/1/53824  S/p right pneumonectomy  Ct home regimen - Prednisone 15 mg daily(being tapered down to 10 mg), Tacrolimus 3.5 mg in the morning and 3 mg in the evening, Atovaquone 1500 mg daily, Azithromycin 250 mg 3 times weekly on M/W/F, Valacyclovir 500 mg daily  Transplant center at [place] updated with current status at # [phone] - goal Tacrolimus trough level 6-8  Tacrolimus level is 17+, discussed with [facility] transplant center Dr. Douglas Lema [phone], recommended hold Tacrolimus today and tomorrow, resume from Sunday night Tacrolium 2 mg BID. Repeat level Monday morning.

## 2024-09-21 NOTE — PLAN OF CARE
Problem: PAIN - ADULT  Goal: Verbalizes/displays adequate comfort level or baseline comfort level  Description: Interventions:  - Encourage patient to monitor pain and request assistance  - Assess pain using appropriate pain scale  - Administer analgesics based on type and severity of pain and evaluate response  - Implement non-pharmacological measures as appropriate and evaluate response  - Consider cultural and social influences on pain and pain management  - Notify physician/advanced practitioner if interventions unsuccessful or patient reports new pain  Outcome: Progressing     Problem: INFECTION - ADULT  Goal: Absence or prevention of progression during hospitalization  Description: INTERVENTIONS:  - Assess and monitor for signs and symptoms of infection  - Monitor lab/diagnostic results  - Monitor all insertion sites, i.e. indwelling lines, tubes, and drains  - Monitor endotracheal if appropriate and nasal secretions for changes in amount and color  - [place] appropriate cooling/warming therapies per order  - Administer medications as ordered  - Instruct and encourage patient and family to use good hand hygiene technique  - Identify and instruct in appropriate isolation precautions for identified infection/condition  Outcome: Progressing  Goal: Absence of fever/infection during neutropenic period  Description: INTERVENTIONS:  - Monitor WBC    Outcome: Progressing     Problem: SAFETY ADULT  Goal: Patient will remain free of falls  Description: INTERVENTIONS:  - Educate patient/family on patient safety including physical limitations  - Instruct patient to call for assistance with activity   - Consult OT/PT to assist with strengthening/mobility   - Keep Call bell within reach  - Keep bed low and locked with side rails adjusted as appropriate  - Keep care items and personal belongings within reach  - Initiate and maintain comfort rounds  - Make Fall Risk Sign visible to staff  - Apply yellow socks and bracelet  for high fall risk patients  - Consider moving patient to room near nurses station  Outcome: Progressing  Goal: Maintain or return to baseline ADL function  Description: INTERVENTIONS:  -  Assess patient's ability to carry out ADLs; assess patient's baseline for ADL function and identify physical deficits which impact ability to perform ADLs (bathing, care of mouth/teeth, toileting, grooming, dressing, etc.)  - Assess/evaluate cause of self-care deficits   - Assess range of motion  - Assess patient's mobility; develop plan if impaired  - Assess patient's need for assistive devices and provide as appropriate  - Encourage maximum independence but intervene and supervise when necessary  - Involve family in performance of ADLs  - Assess for home care needs following discharge   - Consider OT consult to assist with ADL evaluation and planning for discharge  - Provide patient education as appropriate  Outcome: Progressing  Goal: Maintains/Returns to pre admission functional level  Description: INTERVENTIONS:  - Perform AM-PAC 6 Click Basic Mobility/ Daily Activity assessment daily.  - Set and communicate daily mobility goal to care team and patient/family/caregiver.   - Collaborate with rehabilitation services on mobility goals if consulted  - Out of bed for toileting  - Record patient progress and toleration of activity level   Outcome: Progressing     Problem: DISCHARGE PLANNING  Goal: Discharge to home or other facility with appropriate resources  Description: INTERVENTIONS:  - Identify barriers to discharge w/patient and caregiver  - Arrange for needed discharge resources and transportation as appropriate  - Identify discharge learning needs (meds, wound care, etc.)  - Arrange for interpretive services to assist at discharge as needed  - Refer to Case Management Department for coordinating discharge planning if the patient needs post-hospital services based on physician/advanced practitioner order or complex needs  related to functional status, cognitive ability, or social support system  Outcome: Progressing     Problem: Knowledge Deficit  Goal: Patient/family/caregiver demonstrates understanding of disease process, treatment plan, medications, and discharge instructions  Description: Complete learning assessment and assess knowledge base.  Interventions:  - Provide teaching at level of understanding  - Provide teaching via preferred learning methods  Outcome: Progressing

## 2024-09-21 NOTE — PLAN OF CARE
Problem: PAIN - ADULT  Goal: Verbalizes/displays adequate comfort level or baseline comfort level  Description: Interventions:  - Encourage patient to monitor pain and request assistance  - Assess pain using appropriate pain scale  - Administer analgesics based on type and severity of pain and evaluate response  - Implement non-pharmacological measures as appropriate and evaluate response  - Consider cultural and social influences on pain and pain management  - Notify physician/advanced practitioner if interventions unsuccessful or patient reports new pain  Outcome: Progressing     Problem: INFECTION - ADULT  Goal: Absence or prevention of progression during hospitalization  Description: INTERVENTIONS:  - Assess and monitor for signs and symptoms of infection  - Monitor lab/diagnostic results  - Monitor all insertion sites, i.e. indwelling lines, tubes, and drains  - Monitor endotracheal if appropriate and nasal secretions for changes in amount and color  - [place] appropriate cooling/warming therapies per order  - Administer medications as ordered  - Instruct and encourage patient and family to use good hand hygiene technique  - Identify and instruct in appropriate isolation precautions for identified infection/condition  Outcome: Progressing  Goal: Absence of fever/infection during neutropenic period  Description: INTERVENTIONS:  - Monitor WBC    Outcome: Progressing     Problem: SAFETY ADULT  Goal: Patient will remain free of falls  Description: INTERVENTIONS:  - Educate patient/family on patient safety including physical limitations  - Instruct patient to call for assistance with activity   - Consult OT/PT to assist with strengthening/mobility   - Keep Call bell within reach  - Keep bed low and locked with side rails adjusted as appropriate  - Keep care items and personal belongings within reach  - Initiate and maintain comfort rounds  - Make Fall Risk Sign visible to staff  - Offer Toileting every  Hours,  in advance of need  - Initiate/Maintain alarm  - Obtain necessary fall risk management equipment:   - Apply yellow socks and bracelet for high fall risk patients  - Consider moving patient to room near nurses station  Outcome: Progressing  Goal: Maintain or return to baseline ADL function  Description: INTERVENTIONS:  -  Assess patient's ability to carry out ADLs; assess patient's baseline for ADL function and identify physical deficits which impact ability to perform ADLs (bathing, care of mouth/teeth, toileting, grooming, dressing, etc.)  - Assess/evaluate cause of self-care deficits   - Assess range of motion  - Assess patient's mobility; develop plan if impaired  - Assess patient's need for assistive devices and provide as appropriate  - Encourage maximum independence but intervene and supervise when necessary  - Involve family in performance of ADLs  - Assess for home care needs following discharge   - Consider OT consult to assist with ADL evaluation and planning for discharge  - Provide patient education as appropriate  Outcome: Progressing  Goal: Maintains/Returns to pre admission functional level  Description: INTERVENTIONS:  - Perform AM-PAC 6 Click Basic Mobility/ Daily Activity assessment daily.  - Set and communicate daily mobility goal to care team and patient/family/caregiver.   - Collaborate with rehabilitation services on mobility goals if consulted  - Perform Range of Motion  times a day.  - Reposition patient every  hours.  - Dangle patient  times a day  - Stand patient  times a day  - Ambulate patient  times a day  - Out of bed to chair times a day   - Out of bed for meals  times a day  - Out of bed for toileting  - Record patient progress and toleration of activity level   Outcome: Progressing     Problem: DISCHARGE PLANNING  Goal: Discharge to home or other facility with appropriate resources  Description: INTERVENTIONS:  - Identify barriers to discharge w/patient and caregiver  - Arrange for  needed discharge resources and transportation as appropriate  - Identify discharge learning needs (meds, wound care, etc.)  - Arrange for interpretive services to assist at discharge as needed  - Refer to Case Management Department for coordinating discharge planning if the patient needs post-hospital services based on physician/advanced practitioner order or complex needs related to functional status, cognitive ability, or social support system  Outcome: Progressing     Problem: Knowledge Deficit  Goal: Patient/family/caregiver demonstrates understanding of disease process, treatment plan, medications, and discharge instructions  Description: Complete learning assessment and assess knowledge base.  Interventions:  - Provide teaching at level of understanding  - Provide teaching via preferred learning methods  Outcome: Progressing

## 2024-09-21 NOTE — ASSESSMENT & PLAN NOTE
Lab Results   Component Value Date    HGBA1C 7.5 (H) 09/21/2024       Recent Labs     09/20/24  1557 09/20/24  1845 09/20/24 2052 09/21/24  0803   POCGLU 78 67 146* 123       Blood Sugar Average: Last 72 hrs:  (P) 103.5

## 2024-09-21 NOTE — ASSESSMENT & PLAN NOTE
Postop day 1 cystoscopy ureteroscopy, laser lithotripsy basket traction ureteral stent insertion on the left.  -Patient will follow-up with urology in 2 weeks for repeat CT scan if large stone burden will require second definitive stone treatment.  If low stone burden we will plan for stent removal.  -Discussed with patient what to expect postoperative with ureteral stent including frequency, urgency, flank pain with urination, burning with urination and intermittent hematuria as long as ureteral stent is in place.  -May provide Flomax, oxybutynin, Pyridium renal function permitting as needed for any stent colic.  -Patient cleared from urologic standpoint once medically clear from medical standpoint.

## 2024-09-21 NOTE — H&P
H&P - Hospitalist   Name: Maximilian Mann 48 y.o. female I MRN: [id number]  Unit/Bed#: [facility] 934-01 I Date of Admission: 9/20/2024   Date of Service: 9/20/2024 I Hospital Day: 0     Assessment & Plan  Renal calculus, left  Presented to [facility] today with left flank pain since 1 day  CT renal stone study - Left renal pelvic calculus measuring 1.5 x 0.8 x 1.2 cm. Mild fullness of the lower pole calyx.   Transferred to [facility] for urology intervention  S/p cystoscopy, ureteroscopy with lithotripsy holmium laser, retrograde pyelogram and insertion of left ureteral stent and basket stone extraction  Urology plans repeat CT in 2 weeks.  If minimal stone burden then stent will be removed but if large stone burden persistent then plan is for second stage ureteroscopy  Hx of lung transplant ([facility])  H/o cystic fibrosis s/p bilateral lung transplant in 2013 and left lung transplant on 3/1/62894  S/p right pneumonectomy  Ct home regimen - Prednisone 15 mg daily(being tapered down to 10 mg), Tacrolimus 3.5 mg in the morning and 3 mg in the evening, Atovaquone 1500 mg daily, Azithromycin 250 mg 3 times weekly on M/W/F, Valacyclovir 500 mg daily  Transplant center at [facility] updated with current status at # [phone] - goal Tacrolimus trough level 6-8  Follow-up Tacrolimus level  Immunosuppression (HCC)  Meds as above  Type 1 diabetes mellitus with diabetic chronic kidney disease, unspecified CKD stage ([facility])  Lab Results   Component Value Date    HGBA1C 7.6 (H) 05/27/2024       Recent Labs     09/20/24  1557 09/20/24  1845 09/20/24 2052   POCGLU 78 67 146*       Blood Sugar Average: Last 72 hrs:  (P) 97Home regimen: Lantus 12 units in am, Aspart 6 units TID  Drop in blood sugar to 67 this evening  Ct Lantus 12 units in am, add Humalog at 2 units TID and sliding scale insulin  Monitor blood sugars and adjust insulin accordingly    History of pulmonary embolus (PE)  Ct Eliquis 5 mg BID  History of DVT (deep vein  thrombosis)  Ct Eliquis 5 mg BID  Asthma  Ct Symbicort, prn Albuterol  Stage 3a chronic kidney disease (HCC)  Lab Results   Component Value Date    EGFR 53 09/20/2024    EGFR 54 (L) 08/16/2024    EGFR 43 05/26/2024    CREATININE 1.21 09/20/2024    CREATININE 1.15 (H) 08/18/2024    CREATININE 1.24 (H) 08/16/2024   Baseline creatinine 1 to 1.2  Monitor  GERD (gastroesophageal reflux disease)  Ct PPI, Famotidine  Continuous opioid dependence (HCC)  Dilaudid IV added and frequency of Oxycodone increased due to acute on chronic pain from left renal calculus  Pancreatic insufficiency  Ct Pancrealipase  Sore throat  Sore throat since 1 day  Patient was concerned for strep throat  No fever  Strep A PCR - not detected  Symptomatic treatment    VTE Pharmacologic Prophylaxis: VTE Score: 6 High Risk (Score >/= 5) - Pharmacological DVT Prophylaxis Ordered: apixaban (Eliquis). Sequential Compression Devices Ordered.  Code Status: Level 1 - Full Code   Discussion with family: Updated  () at bedside.    Anticipated Length of Stay: Patient will be admitted on an inpatient basis with an anticipated length of stay of greater than 2 midnights secondary to left renal calculus s/p stent with severe pain..    History of Present Illness   Chief Complaint:     Maximilian Mann is a 48 y.o. female with a PMH of lung transplant, type 1 diabetes, CKD 3, GERD, DVT, PE, asthma, cystic fibrosis, pancreatic insufficiency, chronic sinusitis, gastroparesis, status post right pneumonectomy who presents as a transfer from [facility] for urology intervention.  She presented there this morning with left flank pain.  She also complains of a sore throat since last night.  She has chronic dry cough.  No fever or chills.     Review of Systems   HENT:  Positive for sore throat.    Respiratory:  Positive for cough.    Genitourinary:  Positive for flank pain.   All other systems reviewed and are negative.      I have reviewed  the patient's PMH, PSH, Social History, Family History, Meds, and Allergies  Historical Information   Past Medical History:   Diagnosis Date    Abnormal uterine bleeding (AUB)     ABPA (allergic bronchopulmonary aspergillosis) ([facility]) 07/08/2012    Acute kidney injury ([facility]) 09/08/2017    Acute on chronic respiratory failure ([facility]) 03/04/2017    Last Assessment & Plan:  Formatting of this note might be different from the original. Recurrent with clinical worsening with enlarging PTX, s/p multiple chest tubes, but new fevers 6/19 and worsening leukocytosis indicating infection as cause of 6/19 decompensation with profound hypoxia in setting of shunting through right lung and continued pneumothorax s/p 2nd chest tube placement.  6/27: Right    Anaphylaxis 06/06/2022    Anemia     Asthma     Chest pain 09/21/2021    Chronic pain     Chronic respiratory failure with hypoxia ([facility]) 09/07/2021    COVID-19 06/03/2022    Cystic fibrosis ([facility])     Deep vein thrombosis ([facility])     Diabetes mellitus ([facility])     GERD (gastroesophageal reflux disease)     HBP (high blood pressure)     HCAP (healthcare-associated pneumonia) 03/06/2020    History of transfusion     Influenza B 02/23/2020    Kidney stone     Neutropenic fever  ([facility]) 09/26/2020    Pneumonia due to Pseudomonas species ([facility]) 10/28/2010    Last Assessment & Plan:  Formatting of this note might be different from the original. Polymicrobial pna w/ pseudomonas, M. Abscessus, Cadida glabrata/albicans    PONV (postoperative nausea and vomiting)     Pseudomonas aeruginosa infection 09/20/2012    Renal calculus     Squamous cell skin cancer 01/12/2023    SCCIS- Left upper thigh    Transplant recipient     Lung- 2013, 2017    Ulcerative colitis ([facility])     Wellness examination 02/04/2013    Formatting of this note might be different from the original. Discussed with patient and acknowledgement form signed on 2/4/2013 (ALYSHA)     Past Surgical History:   Procedure Laterality Date     APPENDECTOMY      AUGMENTATION MAMMAPLASTY Bilateral 2004    AUGMENTATION MAMMAPLASTY Right     removed     BREAST SURGERY      Aug.     BRONCHOSCOPY      CHOLECYSTECTOMY      COLONOSCOPY      EGD  2019    Yeast Infect.     FL RETROGRADE PYELOGRAM  2020    FL RETROGRADE PYELOGRAM  2020    GASTROSCOPY  2024    GASTROSTOMY TUBE, PLACE      IR BALLOON-OCCLUDED ANTEGRADE TRANSVENOUS OBLITERATION (BATO)  2022    IR PORT PLACEMENT  2022    IR PORT REMOVAL  01/10/2022    IR PORT STRIPPING  2022    IR TUNNELED CENTRAL LINE PLACEMENT  2022    LUNG SURGERY      LUNG TRANSPLANT      X2    VA CYSTO BLADDER W/URETERAL CATHETERIZATION Right 2020    Procedure: CYSTOSCOPY RETROGRADE PYELOGRAM WITH INSERTION STENT URETERAL;  Surgeon: Patricio Perry MD;  Location: [facility];  Service: Urology    VA CYSTO/URETERO W/LITHOTRIPSY &INDWELL STENT INSRT Right 2020    Procedure: CYSTOSCOPY URETEROSCOPY WITH LITHOTRIPSY HOLMIUM LASER, RETROGRADE PYELOGRAM AND INSERTION STENT URETERAL;  Surgeon: Patricio Perry MD;  Location: [facility];  Service: Urology    SKIN BIOPSY      US GUIDED VASCULAR ACCESS  2017     Social History     Tobacco Use    Smoking status: Former     Current packs/day: 0.00     Average packs/day: 0.5 packs/day for 12.0 years (6.0 ttl pk-yrs)     Types: Cigarettes     Start date: 1993     Quit date: 2005     Years since quittin.7     Passive exposure: Past    Smokeless tobacco: Never   Vaping Use    Vaping status: Never Used   Substance and Sexual Activity    Alcohol use: Yes     Alcohol/week: 2.0 standard drinks of alcohol     Types: 2 Glasses of wine per week     Comment: social    Drug use: Yes     Frequency: 2.0 times per week     Types: Marijuana     Comment: medical edible marijuana prescribed    Sexual activity: Yes     Partners: Male     Birth control/protection: None     E-Cigarette/Vaping    E-Cigarette Use Never User       E-Cigarette/Vaping Substances    Nicotine No     THC No     CBD No     Flavoring No     Other No     Unknown No      Family History   Problem Relation Age of Onset    Alcohol abuse Mother     Mental illness Mother     Depression Mother     Alcohol abuse Father     COPD Maternal Grandmother     Lung cancer Maternal Grandmother 65    Cancer Maternal Grandmother     COPD Maternal Grandfather     Cancer Maternal Grandfather     COPD Paternal Grandmother     Lung cancer Paternal Grandmother 65    No Known Problems Sister     No Known Problems Paternal Aunt     Breast cancer Neg Hx     Ovarian cancer Neg Hx     Colon cancer Neg Hx      Social History     Tobacco Use    Smoking status: Former     Current packs/day: 0.00     Average packs/day: 0.5 packs/day for 12.0 years (6.0 ttl pk-yrs)     Types: Cigarettes     Start date: 1993     Quit date: 2005     Years since quittin.7     Passive exposure: Past    Smokeless tobacco: Never   Vaping Use    Vaping status: Never Used   Substance and Sexual Activity    Alcohol use: Yes     Alcohol/week: 2.0 standard drinks of alcohol     Types: 2 Glasses of wine per week     Comment: social    Drug use: Yes     Frequency: 2.0 times per week     Types: Marijuana     Comment: medical edible marijuana prescribed    Sexual activity: Yes     Partners: Male     Birth control/protection: None       Current Facility-Administered Medications:     acetaminophen (TYLENOL) tablet 650 mg, Q6H PRN    albuterol (PROVENTIL HFA,VENTOLIN HFA) inhaler 2 puff, Q4H PRN    ALPRAZolam (XANAX) tablet 1 mg, HS PRN    apixaban (ELIQUIS) tablet 5 mg, BID    [START ON 2024] ascorbic acid (VITAMIN C) tablet 125 mg, Daily    atovaquone (MEPRON) oral suspension 1,500 mg, Daily    azithromycin (ZITHROMAX) tablet 250 mg, Once per day on     budesonide-formoterol (SYMBICORT) 160-4.5 mcg/act inhaler 2 puff, BID    [START ON 2024] cholecalciferol (VITAMIN D3) tablet 5,000  Units, Daily    [START ON 2024] cyanocobalamin (VITAMIN B-12) tablet 1,000 mcg, Daily    dextromethorphan-guaiFENesin (ROBITUSSIN DM) oral syrup 5 mL, Q6H PRN    famotidine (PEPCID) tablet 40 mg, HS    [START ON 2024] furosemide (LASIX) tablet 40 mg, Daily    HYDROmorphone (DILAUDID) injection 0.5 mg, Q2H PRN    [START ON 2024] insulin glargine (LANTUS) subcutaneous injection 12 Units 0.12 mL, QAM    insulin lispro (HumALOG/ADMELOG) 100 units/mL subcutaneous injection 1-5 Units, TID AC **AND** [START ON 2024] Fingerstick Glucose (POCT), TID AC    insulin lispro (HumALOG/ADMELOG) 100 units/mL subcutaneous injection 1-5 Units, HS    insulin lispro (HumALOG/ADMELOG) 100 units/mL subcutaneous injection 2 Units, TID With Meals    [START ON 2024] magnesium Oxide (MAG-OX) tablet 400 mg, Daily    ondansetron (ZOFRAN-ODT) dispersible tablet 4 mg, Q6H PRN    oxybutynin (DITROPAN) tablet 5 mg, TID    oxyCODONE (ROXICODONE) IR tablet 5 mg, Q4H PRN **OR** oxyCODONE (ROXICODONE) immediate release tablet 10 mg, Q4H PRN    pancrelipase (Lip-Prot-Amyl) (CREON) delayed release capsule 48,000 Units, TID With Meals    pantoprazole (PROTONIX) EC tablet 40 mg, Early Morning    predniSONE tablet 15 mg, Daily    pregabalin (LYRICA) capsule 150 mg, BID    tacrolimus (PROGRAF) capsule 3 mg, QPM    [START ON 2024] tacrolimus (PROGRAF) capsule 3.5 mg, Daily    valACYclovir (VALTREX) tablet 500 mg, Daily  Prior to Admission Medications   Prescriptions Last Dose Informant Patient Reported? Taking?   ALPRAZolam (XANAX) 1 mg tablet   No No   Sig: Take 1 tablet (1 mg total) by mouth daily at bedtime as needed for anxiety or sleep   Ascorbic Acid (vitamin C) 100 MG tablet  Self Yes No   Sig: Take 100 mg by mouth daily   BD Pen Needle Abi U/F 32G X 4 MM MISC  Self No No   Si times daily   Cholecalciferol (Vitamin D3) 125 MCG (5000 UT) TABS  Self Yes No   Sig: Take 5,000 Units by mouth daily   Continuous Blood Gluc   (Dexcom G7 ) SUKHI  Self No No   Sig: Use 1 each continuous   Continuous Glucose Sensor (Dexcom G7 Sensor)  Self No No   Sig: Use 1 Device every 10 days   Insulin Disposable Pump (Omnipod 5 G6 Intro, Gen 5,) KIT  Self No No   Sig: Use 1 Units continuous   Insulin Disposable Pump (Omnipod 5 G6 Pods, Gen 5,) MISC  Self No No   Sig: Use 1 Units every other day   Insulin Glargine-yfgn (Semglee, yfgn,) 100 UNIT/ML SOPN   No No   Sig: Inject 0.12 mL (12 Units total) under the skin every morning   Insulin Pen Needle (Comfort EZ Pen Needles) 33G X 4 MM MISC  Self No No   Sig: Use to inject insulin 4 times a day   Respiratory Therapy Supplies (Nebulizer) SUKHI  Self Yes No   Sig: Please dispense nebulizer machine   Turmeric 500 MG CAPS  Self Yes No   Sig: Take by mouth   acetaminophen (TYLENOL) 325 mg tablet  Self No No   Sig: Take 3 tablets (975 mg total) by mouth every 8 (eight) hours   albuterol (2.5 mg/3 mL) 0.083 % nebulizer solution  Self No No   Sig: Take 6 mL (5 mg total) by nebulization 2 (two) times a day   albuterol (PROVENTIL HFA,VENTOLIN HFA) 90 mcg/act inhaler  Self Yes No   Sig: INHALE 2 PUFFS EVERY 8 HOURS AS NEEDED FOR SHORTNESS OF BREATH OR WHEEZING.   apixaban (ELIQUIS) 5 mg  Self No No   Sig: Take 1 tablet (5 mg total) by mouth 2 (two) times a day   atovaquone (MEPRON) 750 mg/5 mL suspension  Self Yes No   Sig: Take 1,500 mg by mouth daily   azithromycin (ZITHROMAX) 250 mg tablet  Self Yes No   Sig: Take 250 mg by mouth 3 (three) times a week   bisacodyl (DULCOLAX) 5 mg EC tablet  Self No No   Sig: Take 1 tablet (5 mg total) by mouth daily at bedtime   dexlansoprazole (DEXILANT) 60 MG capsule  Self No No   Sig: TAKE 1 CAPSULE BY MOUTH EVERY DAY IN THE MORNING   doxycycline hyclate (VIBRA-TABS) 100 mg tablet  Self Yes No   Sig: Take 100 mg by mouth 2 (two) times a day   Patient not taking: Reported on 8/1/2024   dronabinol (MARINOL) 5 MG capsule  Self No No   Sig: Take 1 capsule (5 mg total)  by mouth 3 (three) times a day   famotidine (PEPCID) 40 MG tablet  Self No No   Sig: Take 1 tablet (40 mg total) by mouth daily at bedtime   insulin aspart (NovoLOG FlexPen) 100 UNIT/ML injection pen   No No   Sig: INJECT 10 UNITS pre-BREAKFAST, 8 UNITS pre-LUNCH and 12 UNITS pre-DINNER plus scale; max 50 UNITS/day   multivitamin (THERAGRAN) TABS  Self Yes No   Sig: Take 1 tablet by mouth daily   naloxone (NARCAN) 4 mg/0.1 mL nasal spray  Self No No   Si.1 mL (4 mg total) into each nostril every 3 (three) minutes as needed for opioid reversal or respiratory depression   nystatin (MYCOSTATIN) 500,000 units/5 mL suspension  Self No No   Sig: Apply 5 mL (500,000 Units total) to the mouth or throat 4 (four) times a day   Patient not taking: Reported on 2024   ondansetron (ZOFRAN) 4 mg tablet  Self Yes No   Sig: Take 4 mg by mouth   Patient not taking: Reported on 2024   oxyCODONE (ROXICODONE) 10 MG TABS   No No   Sig: Take 1 to 2 tablets every 8hs as needed for severe pain. Max dose 6 tabs a day   oxybutynin (DITROPAN XL) 15 MG 24 hr tablet   No No   Sig: Take 1 tablet (15 mg total) by mouth daily at bedtime   pancrelipase, Lip-Prot-Amyl, (CREON) 24,000 units  Self Yes No   Sig: Take 48,000 Units by mouth 3 (three) times a day with meals   polyethylene glycol (MIRALAX) 17 g packet  Self No No   Sig: Take 17 g by mouth 2 (two) times a day   predniSONE 10 mg tablet  Self Yes No   Sig: Take 10 mg by mouth daily   pregabalin (LYRICA) 150 mg capsule  Self No No   Sig: Take 1 capsule (150 mg total) by mouth 2 times a day   promethazine-dextromethorphan (PHENERGAN-DM) 6.25-15 mg/5 mL oral syrup  Self No No   Sig: Take 5 mL by mouth 4 (four) times a day as needed for cough   tacrolimus (PROGRAF) 1 mg capsule  Self Yes No   Sig: Take 2.5 mg by mouth 2 (two) times a day Take 2 tablets and a 0.5 table to total 2.5 mg twice a day 5mg total daily   valACYclovir (VALTREX) 500 mg tablet  Self Yes No   Sig: Take 500 mg by  "mouth daily   vitamin B-12 (VITAMIN B-12) 1,000 mcg tablet  Self Yes No   Sig: Take by mouth daily      Facility-Administered Medications: None     Bebtelovimab, Vancomycin, Gabapentin, Ceftazidime, and Nsaids  Social History:  Marital Status: /Civil Union     Objective     Vitals:   Blood Pressure: 130/77 (09/20/24 2216)  Pulse: 77 (09/20/24 2216)  Temperature: 100 °F (37.8 °C) (09/20/24 2216)  Temp Source: Oral (09/20/24 1719)  Respirations: 14 (09/20/24 2216)  Height: 5' 2\" (157.5 cm) (09/20/24 1719)  Weight - Scale: 56.6 kg (124 lb 12.5 oz) (09/20/24 1719)  SpO2: 95 % (09/20/24 2216)    Physical Exam  Vitals reviewed.   HENT:      Head: Normocephalic.      Nose: Nose normal.      Mouth/Throat:      Mouth: Mucous membranes are moist.   Eyes:      Extraocular Movements: Extraocular movements intact.   Cardiovascular:      Rate and Rhythm: Normal rate and regular rhythm.   Pulmonary:      Effort: Pulmonary effort is normal. No respiratory distress.      Breath sounds: Normal breath sounds. No wheezing.   Abdominal:      General: Bowel sounds are normal. There is no distension.      Palpations: Abdomen is soft.      Tenderness: There is no abdominal tenderness.   Musculoskeletal:         General: No swelling.      Cervical back: Neck supple.   Skin:     General: Skin is warm and dry.   Neurological:      General: No focal deficit present.      Mental Status: She is alert and oriented to person, place, and time.   Psychiatric:         Mood and Affect: Mood normal.         Behavior: Behavior normal.         Lines/Drains:  Lines/Drains/Airways       Active Status       Name Placement date Placement time Site Days    Port A Cath 11/25/22 Right Chest 11/25/22  0953  Chest  665    Port A Cath 11/25/22 Right Chest 11/25/22  1201  Chest  665                    Central Line:  Goal for removal: Maintain PORT           Additional Data:   Lab Results: I have reviewed the following results:   Results from last 7 days "   Lab Units 09/20/24  0748   WBC Thousand/uL 9.14   HEMOGLOBIN g/dL 12.5   HEMATOCRIT % 40.6   PLATELETS Thousands/uL 167   SEGS PCT % 67   LYMPHO PCT % 19   MONO PCT % 12   EOS PCT % 1     Results from last 7 days   Lab Units 09/20/24  0748   SODIUM mmol/L 136   POTASSIUM mmol/L 4.3   CHLORIDE mmol/L 97   CO2 mmol/L 33*   BUN mg/dL 31*   CREATININE mg/dL 1.21   ANION GAP mmol/L 6   CALCIUM mg/dL 8.9   ALBUMIN g/dL 3.8   TOTAL BILIRUBIN mg/dL 0.62   ALK PHOS U/L 74   ALT U/L 18   AST U/L 11*   GLUCOSE RANDOM mg/dL 144*         Results from last 7 days   Lab Units 09/20/24 2052 09/20/24  1845 09/20/24  1557   POC GLUCOSE mg/dl 146* 67 78     Lab Results   Component Value Date    HGBA1C 7.6 (H) 05/27/2024    HGBA1C 8.0 (A) 01/15/2024    HGBA1C 8.5 02/17/2023     Results from last 7 days   Lab Units 09/20/24  0748   LACTIC ACID mmol/L 0.9       Imaging Review: Reviewed radiology reports from this admission including: CT abdomen/pelvis.  Administrative Statements   I have spent a total time of 75 minutes in caring for this patient on the day of the visit/encounter including Diagnostic results, Impressions, Counseling / Coordination of care, Documenting in the medical record, Reviewing / ordering tests, medicine, procedures  , Obtaining or reviewing history  , and Communicating with other healthcare professionals .    ** Please Note: This note has been constructed using a voice recognition system. **

## 2024-09-21 NOTE — PROGRESS NOTES
Progress Note - Hospitalist   Name: Maximilian Mann 48 y.o. female I MRN: [id number]  Unit/Bed#: [facility] 934-01 I Date of Admission: 9/20/2024   Date of Service: 9/21/2024 I Hospital Day: 1    Assessment & Plan  Renal calculus, left  /Presented to [facility] today with left flank pain since 1 day  /CT renal stone study - Left renal pelvic calculus measuring 1.5 x 0.8 x 1.2 cm. Mild fullness of the lower pole calyx.   [facility] intervention  S/p cystoscopy, ureteroscopy with lithotripsy holmium laser, retrograde pyelogram and insertion of left ureteral stent and basket stone extraction 9/20/24  Urology plans repeat CT in 2 weeks.  If minimal stone burden then stent will be removed but if large stone burden persistent then plan is for second stage ureteroscopy  Currently complaining of worsening pain, increased pelvic pressure and noel hematuria, likely passing a stone.   Start Flomax, pyridium. Strain all urine. Bladder scan.  Hold Eliquis for noel hematuria  Hx of lung transplant ([facility])  H/o cystic fibrosis s/p bilateral lung transplant in 2013 and left lung transplant on 3/1/03047  S/p right pneumonectomy  Ct home regimen - Prednisone 15 mg daily(being tapered down to 10 mg), Tacrolimus 3.5 mg in the morning and 3 mg in the evening, Atovaquone 1500 mg daily, Azithromycin 250 mg 3 times weekly on M/W/F, Valacyclovir 500 mg daily  Transplant center at [place] updated with current status at # [phone] - goal Tacrolimus trough level 6-8  Tacrolimus level is 17+, discussed with [facility] transplant center Dr. Douglas Lema [phone], recommended hold Tacrolimus today and tomorrow, resume from Sunday night Tacrolium 2 mg BID. Repeat level Monday morning.   Type 1 diabetes mellitus with diabetic chronic kidney disease, unspecified CKD stage ([facility])  Lab Results   Component Value Date    HGBA1C 7.5 (H) 09/21/2024       Recent Labs     09/20/24  1845 09/20/24 2052 09/21/24  0803  09/21/24  1204   POCGLU 67 146* 123 264*       Blood Sugar Average: Last 72 hrs:  (P) 135.6Home regimen: Lantus 12 units in am, Aspart 6 units TID  Drop in blood sugar to 67 this evening  Ct Lantus 12 units in am, add Humalog at 2 units TID and sliding scale insulin  Monitor blood sugars and adjust insulin accordingly    History of pulmonary embolus (PE)  Hold Eliquis 5 mg BID for gross hematuria  Asthma  Ct Symbicort, prn Albuterol  Stage 3a chronic kidney disease (HCC)  Lab Results   Component Value Date    EGFR 61 09/21/2024    EGFR 53 09/20/2024    EGFR 54 (L) 08/16/2024    CREATININE 1.08 09/21/2024    CREATININE 1.21 09/20/2024    CREATININE 1.15 (H) 08/18/2024   Baseline creatinine 1 to 1.2  Monitor  Continuous opioid dependence (HCC)  Dilaudid IV added and frequency of Oxycodone increased due to acute on chronic pain from left renal calculus  Pancreatic insufficiency  Ct Pancrealipase  Sore throat  Sore throat since 1 day  Patient was concerned for strep throat  No fever  Strep A PCR - not detected  Symptomatic treatment    VTE Pharmacologic Prophylaxis: VTE Score: 6 Moderate Risk (Score 3-4) - Pharmacological DVT Prophylaxis Contraindicated. Sequential Compression Devices Ordered. Gross hematuria    Mobility:   Basic Mobility Inpatient Raw Score: 24  JH-HLM Goal: 8: Walk 250 feet or more  JH-HLM Achieved: 7: Walk 25 feet or more  JH-HLM Goal achieved. Continue to encourage appropriate mobility.    Patient Centered Rounds: I performed bedside rounds with nursing staff today.   Discussions with Specialists or Other Care Team Provider:   Discussed with [facility] transplant center Dr. Urias regarding tacrolimus level    Education and Discussions with Family / Patient: Patient declined call to .     Current Length of Stay: 1 day(s)  Current Patient Status: Inpatient   Certification Statement: The patient will continue to require additional inpatient hospital stay due to uncontrolled  pain  Discharge Plan: Anticipate discharge in 24-48 hrs to home.    Code Status: Level 1 - Full Code    Subjective   ]  Patient examined at bedside, she is tearful, clutching her lower abdomen stating she has significant bladder spasm and pain.  Patient feels a lot of pressure, feels like she is passing a stone.  Also has dysuria and noel hematuria.    Objective     Vitals:   Temp (24hrs), Av.5 °F (36.9 °C), Min:97.6 °F (36.4 °C), Max:100 °F (37.8 °C)    Temp:  [97.6 °F (36.4 °C)-100 °F (37.8 °C)] 98.5 °F (36.9 °C)  HR:  [59-82] 82  Resp:  [14-25] 25  BP: (130-165)/(73-98) 154/94  SpO2:  [95 %-100 %] 99 %  Body mass index is 22.82 kg/m².     Input and Output Summary (last 24 hours):     Intake/Output Summary (Last 24 hours) at 2024 1436  Last data filed at 2024 0920  Gross per 24 hour   Intake 1220 ml   Output 1775 ml   Net -555 ml       Physical Exam  Constitutional:       General: She is in acute distress.      Appearance: She is well-developed.   HENT:      Head: Normocephalic and atraumatic.      Right Ear: External ear normal.      Left Ear: External ear normal.      Nose: Nose normal.   Eyes:      Conjunctiva/sclera: Conjunctivae normal.   Cardiovascular:      Rate and Rhythm: Normal rate and regular rhythm.      Heart sounds: Normal heart sounds.   Pulmonary:      Effort: Pulmonary effort is normal.      Breath sounds: Normal breath sounds.   Abdominal:      General: Bowel sounds are normal.      Palpations: Abdomen is soft.   Genitourinary:     Comments: deferred  Musculoskeletal:      Cervical back: Normal range of motion and neck supple.   Skin:     General: Skin is warm and dry.   Neurological:      Mental Status: She is alert.      Comments: Cranial nerve 2 -12 are normal.        Lines/Drains:  Lines/Drains/Airways       Active Status       Name Placement date Placement time Site Days    Port A Cath 22 Right Chest 22  0953  Chest  666    Port A Cath 22 Right Chest  11/25/22  1201  Chest  666                    Central Line:             Results from last 7 days   Lab Units 09/21/24  0435   WBC Thousand/uL 8.57   HEMOGLOBIN g/dL 11.9   HEMATOCRIT % 39.4   PLATELETS Thousands/uL 147*   SEGS PCT % 76*   LYMPHO PCT % 15   MONO PCT % 9   EOS PCT % 0     Results from last 7 days   Lab Units 09/21/24  0435 09/20/24  0748   SODIUM mmol/L 135 136   POTASSIUM mmol/L 4.9 4.3   CHLORIDE mmol/L 100 97   CO2 mmol/L 29 33*   BUN mg/dL 26* 31*   CREATININE mg/dL 1.08 1.21   ANION GAP mmol/L 6 6   CALCIUM mg/dL 8.6 8.9   ALBUMIN g/dL  --  3.8   TOTAL BILIRUBIN mg/dL  --  0.62   ALK PHOS U/L  --  74   ALT U/L  --  18   AST U/L  --  11*   GLUCOSE RANDOM mg/dL 153* 144*         Results from last 7 days   Lab Units 09/21/24  1204 09/21/24  0803 09/20/24  2052 09/20/24  1845 09/20/24  1557   POC GLUCOSE mg/dl 264* 123 146* 67 78     Results from last 7 days   Lab Units 09/21/24  0435   HEMOGLOBIN A1C % 7.5*     Results from last 7 days   Lab Units 09/20/24  0748   LACTIC ACID mmol/L 0.9       Recent Cultures (last 7 days):   Results from last 7 days   Lab Units 09/20/24  1452   URINE CULTURE  Culture too young- will reincubate          Last 24 Hours Medication List:     Current Facility-Administered Medications:     acetaminophen (TYLENOL) tablet 650 mg, Q6H PRN    albuterol (PROVENTIL HFA,VENTOLIN HFA) inhaler 2 puff, Q4H PRN    ALPRAZolam (XANAX) tablet 1 mg, HS PRN    apixaban (ELIQUIS) tablet 5 mg, BID    ascorbic acid (VITAMIN C) tablet 125 mg, Daily    atovaquone (MEPRON) oral suspension 1,500 mg, Daily    azithromycin (ZITHROMAX) tablet 250 mg, Once per day on Monday Wednesday Friday    budesonide-formoterol (SYMBICORT) 160-4.5 mcg/act inhaler 2 puff, BID    Cholecalciferol (VITAMIN D3) tablet 5,000 Units, Daily    cyanocobalamin (VITAMIN B-12) tablet 1,000 mcg, Daily    dextromethorphan-guaiFENesin (ROBITUSSIN DM) oral syrup 5 mL, Q6H PRN    famotidine (PEPCID) tablet 40 mg, HS    furosemide  (LASIX) tablet 40 mg, Daily    HYDROmorphone (DILAUDID) injection 0.5 mg, Q2H PRN    HYDROmorphone (DILAUDID) injection 0.5 mg, Once    insulin glargine (LANTUS) subcutaneous injection 12 Units 0.12 mL, QAM    insulin lispro (HumALOG/ADMELOG) 100 units/mL subcutaneous injection 1-5 Units, TID AC **AND** Fingerstick Glucose (POCT), TID AC    insulin lispro (HumALOG/ADMELOG) 100 units/mL subcutaneous injection 1-5 Units, HS    insulin lispro (HumALOG/ADMELOG) 100 units/mL subcutaneous injection 2 Units, TID With Meals    magnesium Oxide (MAG-OX) tablet 400 mg, Daily    ondansetron (ZOFRAN-ODT) dispersible tablet 4 mg, Q6H PRN    oxybutynin (DITROPAN) tablet 5 mg, TID    oxyCODONE (ROXICODONE) IR tablet 5 mg, Q4H PRN **OR** oxyCODONE (ROXICODONE) immediate release tablet 10 mg, Q4H PRN    pancrelipase (Lip-Prot-Amyl) (CREON) delayed release capsule 48,000 Units, TID With Meals    pantoprazole (PROTONIX) EC tablet 40 mg, Early Morning    phenazopyridine (PYRIDIUM) tablet 100 mg, TID With Meals    predniSONE tablet 15 mg, Daily    pregabalin (LYRICA) capsule 150 mg, BID    [START ON 9/22/2024] tacrolimus (PROGRAF) capsule 2 mg, Q12H DOTTY    tamsulosin (FLOMAX) capsule 0.4 mg, Daily With Dinner    valACYclovir (VALTREX) tablet 500 mg, Daily    Administrative Statements   Today, Patient Was Seen By: Trey Akers MD  I have spent a total time of   minutes in caring for this patient on the day of the visit/encounter including Diagnostic results, Risks and benefits of tx options, Instructions for management, and Impressions. Topics discussed with the patient / family include symptom assessment and management.    **Please Note: This note may have been constructed using a voice recognition system.**

## 2024-09-21 NOTE — ASSESSMENT & PLAN NOTE
Lab Results   Component Value Date    EGFR 61 09/21/2024    EGFR 53 09/20/2024    EGFR 54 (L) 08/16/2024    CREATININE 1.08 09/21/2024    CREATININE 1.21 09/20/2024    CREATININE 1.15 (H) 08/18/2024   Baseline creatinine 1 to 1.2  Monitor

## 2024-09-22 PROBLEM — R50.9 FEVER: Status: ACTIVE | Noted: 2024-09-22

## 2024-09-22 LAB
ANION GAP SERPL CALCULATED.3IONS-SCNC: 6 MMOL/L (ref 4–13)
BACTERIA UR QL AUTO: ABNORMAL /HPF
BILIRUB UR QL STRIP: NEGATIVE
BUN SERPL-MCNC: 29 MG/DL (ref 5–25)
CALCIUM SERPL-MCNC: 8.4 MG/DL (ref 8.4–10.2)
CHLORIDE SERPL-SCNC: 97 MMOL/L (ref 96–108)
CLARITY UR: ABNORMAL
CO2 SERPL-SCNC: 31 MMOL/L (ref 21–32)
COLOR UR: ABNORMAL
CREAT SERPL-MCNC: 1.36 MG/DL (ref 0.6–1.3)
ERYTHROCYTE [DISTWIDTH] IN BLOOD BY AUTOMATED COUNT: 14.5 % (ref 11.6–15.1)
FLUAV RNA RESP QL NAA+PROBE: NEGATIVE
FLUBV RNA RESP QL NAA+PROBE: NEGATIVE
GFR SERPL CREATININE-BSD FRML MDRD: 46 ML/MIN/1.73SQ M
GLUCOSE SERPL-MCNC: 136 MG/DL (ref 65–140)
GLUCOSE SERPL-MCNC: 137 MG/DL (ref 65–140)
GLUCOSE SERPL-MCNC: 152 MG/DL (ref 65–140)
GLUCOSE SERPL-MCNC: 196 MG/DL (ref 65–140)
GLUCOSE SERPL-MCNC: 358 MG/DL (ref 65–140)
GLUCOSE UR STRIP-MCNC: NEGATIVE MG/DL
HCT VFR BLD AUTO: 34.7 % (ref 34.8–46.1)
HGB BLD-MCNC: 10.5 G/DL (ref 11.5–15.4)
HGB UR QL STRIP.AUTO: ABNORMAL
HYALINE CASTS #/AREA URNS LPF: ABNORMAL /LPF
KETONES UR STRIP-MCNC: NEGATIVE MG/DL
LACTATE SERPL-SCNC: 1.2 MMOL/L (ref 0.5–2)
LEUKOCYTE ESTERASE UR QL STRIP: ABNORMAL
MCH RBC QN AUTO: 29.1 PG (ref 26.8–34.3)
MCHC RBC AUTO-ENTMCNC: 30.3 G/DL (ref 31.4–37.4)
MCV RBC AUTO: 96 FL (ref 82–98)
MUCOUS THREADS UR QL AUTO: ABNORMAL
NITRITE UR QL STRIP: NEGATIVE
NON-SQ EPI CELLS URNS QL MICRO: ABNORMAL /HPF
PH UR STRIP.AUTO: 5.5 [PH]
PLATELET # BLD AUTO: 136 THOUSANDS/UL (ref 149–390)
PMV BLD AUTO: 10.4 FL (ref 8.9–12.7)
POTASSIUM SERPL-SCNC: 4.1 MMOL/L (ref 3.5–5.3)
PROT UR STRIP-MCNC: ABNORMAL MG/DL
RBC # BLD AUTO: 3.61 MILLION/UL (ref 3.81–5.12)
RBC #/AREA URNS AUTO: ABNORMAL /HPF
RSV RNA RESP QL NAA+PROBE: NEGATIVE
SARS-COV-2 RNA RESP QL NAA+PROBE: NEGATIVE
SODIUM SERPL-SCNC: 134 MMOL/L (ref 135–147)
SP GR UR STRIP.AUTO: 1.01 (ref 1–1.03)
UROBILINOGEN UR STRIP-ACNC: <2 MG/DL
WBC # BLD AUTO: 6.57 THOUSAND/UL (ref 4.31–10.16)
WBC #/AREA URNS AUTO: ABNORMAL /HPF

## 2024-09-22 PROCEDURE — 83605 ASSAY OF LACTIC ACID: CPT | Performed by: FAMILY MEDICINE

## 2024-09-22 PROCEDURE — 94640 AIRWAY INHALATION TREATMENT: CPT

## 2024-09-22 PROCEDURE — 87086 URINE CULTURE/COLONY COUNT: CPT | Performed by: FAMILY MEDICINE

## 2024-09-22 PROCEDURE — 0241U HB NFCT DS VIR RESP RNA 4 TRGT: CPT | Performed by: FAMILY MEDICINE

## 2024-09-22 PROCEDURE — 99233 SBSQ HOSP IP/OBS HIGH 50: CPT | Performed by: FAMILY MEDICINE

## 2024-09-22 PROCEDURE — 81001 URINALYSIS AUTO W/SCOPE: CPT | Performed by: FAMILY MEDICINE

## 2024-09-22 PROCEDURE — 87040 BLOOD CULTURE FOR BACTERIA: CPT | Performed by: FAMILY MEDICINE

## 2024-09-22 PROCEDURE — 80048 BASIC METABOLIC PNL TOTAL CA: CPT | Performed by: FAMILY MEDICINE

## 2024-09-22 PROCEDURE — 82948 REAGENT STRIP/BLOOD GLUCOSE: CPT

## 2024-09-22 PROCEDURE — 94760 N-INVAS EAR/PLS OXIMETRY 1: CPT

## 2024-09-22 PROCEDURE — 85027 COMPLETE CBC AUTOMATED: CPT | Performed by: FAMILY MEDICINE

## 2024-09-22 RX ORDER — SODIUM CHLORIDE FOR INHALATION 3 %
4 VIAL, NEBULIZER (ML) INHALATION
Status: DISCONTINUED | OUTPATIENT
Start: 2024-09-22 | End: 2024-09-22

## 2024-09-22 RX ORDER — ALBUTEROL SULFATE 0.83 MG/ML
5 SOLUTION RESPIRATORY (INHALATION)
Status: DISCONTINUED | OUTPATIENT
Start: 2024-09-22 | End: 2024-09-22

## 2024-09-22 RX ORDER — ACETAMINOPHEN 325 MG/1
325 TABLET ORAL ONCE
Status: COMPLETED | OUTPATIENT
Start: 2024-09-22 | End: 2024-09-22

## 2024-09-22 RX ORDER — LEVALBUTEROL INHALATION SOLUTION 1.25 MG/3ML
1.25 SOLUTION RESPIRATORY (INHALATION)
Status: DISCONTINUED | OUTPATIENT
Start: 2024-09-22 | End: 2024-09-26 | Stop reason: HOSPADM

## 2024-09-22 RX ORDER — SODIUM CHLORIDE FOR INHALATION 3 %
4 VIAL, NEBULIZER (ML) INHALATION
Status: DISCONTINUED | OUTPATIENT
Start: 2024-09-22 | End: 2024-09-26 | Stop reason: HOSPADM

## 2024-09-22 RX ORDER — ACETAMINOPHEN 325 MG/1
975 TABLET ORAL EVERY 6 HOURS PRN
Status: DISCONTINUED | OUTPATIENT
Start: 2024-09-22 | End: 2024-09-26 | Stop reason: HOSPADM

## 2024-09-22 RX ORDER — SODIUM CHLORIDE FOR INHALATION 3 %
4 VIAL, NEBULIZER (ML) INHALATION
Status: DISCONTINUED | OUTPATIENT
Start: 2024-09-23 | End: 2024-09-22

## 2024-09-22 RX ORDER — LEVALBUTEROL INHALATION SOLUTION 1.25 MG/3ML
1.25 SOLUTION RESPIRATORY (INHALATION)
Status: DISCONTINUED | OUTPATIENT
Start: 2024-09-22 | End: 2024-09-22

## 2024-09-22 RX ADMIN — PREGABALIN 150 MG: 75 CAPSULE ORAL at 17:13

## 2024-09-22 RX ADMIN — ACETAMINOPHEN 975 MG: 325 TABLET ORAL at 22:02

## 2024-09-22 RX ADMIN — OXYBUTYNIN CHLORIDE 5 MG: 5 TABLET ORAL at 08:16

## 2024-09-22 RX ADMIN — SODIUM CHLORIDE SOLN NEBU 3% 4 ML: 3 NEBU SOLN at 19:33

## 2024-09-22 RX ADMIN — LEVALBUTEROL HYDROCHLORIDE 1.25 MG: 1.25 SOLUTION RESPIRATORY (INHALATION) at 19:33

## 2024-09-22 RX ADMIN — VALACYCLOVIR HYDROCHLORIDE 500 MG: 500 TABLET, FILM COATED ORAL at 08:18

## 2024-09-22 RX ADMIN — INSULIN LISPRO 2 UNITS: 100 INJECTION, SOLUTION INTRAVENOUS; SUBCUTANEOUS at 17:15

## 2024-09-22 RX ADMIN — INSULIN GLARGINE 12 UNITS: 100 INJECTION, SOLUTION SUBCUTANEOUS at 08:15

## 2024-09-22 RX ADMIN — MAGNESIUM OXIDE TAB 400 MG (241.3 MG ELEMENTAL MG) 400 MG: 400 (241.3 MG) TAB at 08:16

## 2024-09-22 RX ADMIN — INSULIN LISPRO 1 UNITS: 100 INJECTION, SOLUTION INTRAVENOUS; SUBCUTANEOUS at 21:54

## 2024-09-22 RX ADMIN — BUDESONIDE AND FORMOTEROL FUMARATE DIHYDRATE 2 PUFF: 160; 4.5 AEROSOL RESPIRATORY (INHALATION) at 17:14

## 2024-09-22 RX ADMIN — APIXABAN 5 MG: 5 TABLET, FILM COATED ORAL at 08:15

## 2024-09-22 RX ADMIN — TAMSULOSIN HYDROCHLORIDE 0.4 MG: 0.4 CAPSULE ORAL at 17:14

## 2024-09-22 RX ADMIN — PHENAZOPYRIDINE HYDROCHLORIDE 100 MG: 100 TABLET ORAL at 08:19

## 2024-09-22 RX ADMIN — ACETAMINOPHEN 650 MG: 325 TABLET ORAL at 08:15

## 2024-09-22 RX ADMIN — OXYCODONE HYDROCHLORIDE 10 MG: 10 TABLET ORAL at 17:13

## 2024-09-22 RX ADMIN — OXYBUTYNIN CHLORIDE 5 MG: 5 TABLET ORAL at 15:19

## 2024-09-22 RX ADMIN — OXYCODONE HYDROCHLORIDE 10 MG: 10 TABLET ORAL at 08:45

## 2024-09-22 RX ADMIN — OXYCODONE HYDROCHLORIDE 10 MG: 10 TABLET ORAL at 22:18

## 2024-09-22 RX ADMIN — PHENAZOPYRIDINE HYDROCHLORIDE 100 MG: 100 TABLET ORAL at 11:34

## 2024-09-22 RX ADMIN — Medication 5000 UNITS: at 08:15

## 2024-09-22 RX ADMIN — PREDNISONE 15 MG: 5 TABLET ORAL at 08:16

## 2024-09-22 RX ADMIN — ALPRAZOLAM 1 MG: 0.5 TABLET ORAL at 00:59

## 2024-09-22 RX ADMIN — ATOVAQUONE 1500 MG: 750 SUSPENSION ORAL at 08:19

## 2024-09-22 RX ADMIN — HYDROMORPHONE HYDROCHLORIDE 1 MG: 1 INJECTION, SOLUTION INTRAMUSCULAR; INTRAVENOUS; SUBCUTANEOUS at 10:30

## 2024-09-22 RX ADMIN — ALBUTEROL SULFATE 2 PUFF: 90 AEROSOL, METERED RESPIRATORY (INHALATION) at 11:32

## 2024-09-22 RX ADMIN — HYDROMORPHONE HYDROCHLORIDE 1 MG: 1 INJECTION, SOLUTION INTRAMUSCULAR; INTRAVENOUS; SUBCUTANEOUS at 06:31

## 2024-09-22 RX ADMIN — CYANOCOBALAMIN TAB 500 MCG 1000 MCG: 500 TAB at 08:15

## 2024-09-22 RX ADMIN — OXYBUTYNIN CHLORIDE 5 MG: 5 TABLET ORAL at 20:32

## 2024-09-22 RX ADMIN — Medication 125 MG: at 08:19

## 2024-09-22 RX ADMIN — FUROSEMIDE 40 MG: 40 TABLET ORAL at 08:15

## 2024-09-22 RX ADMIN — HYDROMORPHONE HYDROCHLORIDE 1 MG: 1 INJECTION, SOLUTION INTRAMUSCULAR; INTRAVENOUS; SUBCUTANEOUS at 15:17

## 2024-09-22 RX ADMIN — TACROLIMUS 2 MG: 1 CAPSULE ORAL at 20:32

## 2024-09-22 RX ADMIN — BUDESONIDE AND FORMOTEROL FUMARATE DIHYDRATE 2 PUFF: 160; 4.5 AEROSOL RESPIRATORY (INHALATION) at 08:18

## 2024-09-22 RX ADMIN — PREGABALIN 150 MG: 75 CAPSULE ORAL at 08:15

## 2024-09-22 RX ADMIN — PANTOPRAZOLE SODIUM 40 MG: 40 TABLET, DELAYED RELEASE ORAL at 04:44

## 2024-09-22 RX ADMIN — PANCRELIPASE 48000 UNITS: 120000; 24000; 76000 CAPSULE, DELAYED RELEASE PELLETS ORAL at 12:24

## 2024-09-22 RX ADMIN — APIXABAN 5 MG: 5 TABLET, FILM COATED ORAL at 17:14

## 2024-09-22 RX ADMIN — INSULIN LISPRO 2 UNITS: 100 INJECTION, SOLUTION INTRAVENOUS; SUBCUTANEOUS at 08:18

## 2024-09-22 RX ADMIN — CEFTRIAXONE SODIUM 1000 MG: 10 INJECTION, POWDER, FOR SOLUTION INTRAVENOUS at 09:41

## 2024-09-22 RX ADMIN — HYDROMORPHONE HYDROCHLORIDE 1 MG: 1 INJECTION, SOLUTION INTRAMUSCULAR; INTRAVENOUS; SUBCUTANEOUS at 00:04

## 2024-09-22 RX ADMIN — ACETAMINOPHEN 325 MG: 325 TABLET ORAL at 11:33

## 2024-09-22 RX ADMIN — PANCRELIPASE 48000 UNITS: 120000; 24000; 76000 CAPSULE, DELAYED RELEASE PELLETS ORAL at 08:18

## 2024-09-22 RX ADMIN — OXYCODONE HYDROCHLORIDE 10 MG: 10 TABLET ORAL at 13:36

## 2024-09-22 RX ADMIN — FAMOTIDINE 40 MG: 20 TABLET, FILM COATED ORAL at 21:54

## 2024-09-22 RX ADMIN — INSULIN LISPRO 3 UNITS: 100 INJECTION, SOLUTION INTRAVENOUS; SUBCUTANEOUS at 17:14

## 2024-09-22 RX ADMIN — HYDROMORPHONE HYDROCHLORIDE 1 MG: 1 INJECTION, SOLUTION INTRAMUSCULAR; INTRAVENOUS; SUBCUTANEOUS at 20:31

## 2024-09-22 RX ADMIN — PHENAZOPYRIDINE HYDROCHLORIDE 100 MG: 100 TABLET ORAL at 17:15

## 2024-09-22 RX ADMIN — PANCRELIPASE 48000 UNITS: 120000; 24000; 76000 CAPSULE, DELAYED RELEASE PELLETS ORAL at 17:15

## 2024-09-22 RX ADMIN — OXYCODONE HYDROCHLORIDE 10 MG: 10 TABLET ORAL at 04:43

## 2024-09-22 RX ADMIN — INSULIN LISPRO 2 UNITS: 100 INJECTION, SOLUTION INTRAVENOUS; SUBCUTANEOUS at 11:35

## 2024-09-22 NOTE — PLAN OF CARE
Problem: PAIN - ADULT  Goal: Verbalizes/displays adequate comfort level or baseline comfort level  Description: Interventions:  - Encourage patient to monitor pain and request assistance  - Assess pain using appropriate pain scale  - Administer analgesics based on type and severity of pain and evaluate response  - Implement non-pharmacological measures as appropriate and evaluate response  - Consider cultural and social influences on pain and pain management  - Notify physician/advanced practitioner if interventions unsuccessful or patient reports new pain  Outcome: Progressing     Problem: INFECTION - ADULT  Goal: Absence or prevention of progression during hospitalization  Description: INTERVENTIONS:  - Assess and monitor for signs and symptoms of infection  - Monitor lab/diagnostic results  - Monitor all insertion sites, i.e. indwelling lines, tubes, and drains  - Monitor endotracheal if appropriate and nasal secretions for changes in amount and color  - [place] appropriate cooling/warming therapies per order  - Administer medications as ordered  - Instruct and encourage patient and family to use good hand hygiene technique  - Identify and instruct in appropriate isolation precautions for identified infection/condition  Outcome: Progressing  Goal: Absence of fever/infection during neutropenic period  Description: INTERVENTIONS:  - Monitor WBC    Outcome: Progressing     Problem: SAFETY ADULT  Goal: Patient will remain free of falls  Description: INTERVENTIONS:  - Educate patient/family on patient safety including physical limitations  - Instruct patient to call for assistance with activity   - Consult OT/PT to assist with strengthening/mobility   - Keep Call bell within reach  - Keep bed low and locked with side rails adjusted as appropriate  - Keep care items and personal belongings within reach  - Initiate and maintain comfort rounds  - Make Fall Risk Sign visible to staff  - Offer Toileting every  Hours,  in advance of need  - Initiate/Maintain alarm  - Obtain necessary fall risk management equipment:  - Apply yellow socks and bracelet for high fall risk patients  - Consider moving patient to room near nurses station  Outcome: Progressing  Goal: Maintain or return to baseline ADL function  Description: INTERVENTIONS:  -  Assess patient's ability to carry out ADLs; assess patient's baseline for ADL function and identify physical deficits which impact ability to perform ADLs (bathing, care of mouth/teeth, toileting, grooming, dressing, etc.)  - Assess/evaluate cause of self-care deficits   - Assess range of motion  - Assess patient's mobility; develop plan if impaired  - Assess patient's need for assistive devices and provide as appropriate  - Encourage maximum independence but intervene and supervise when necessary  - Involve family in performance of ADLs  - Assess for home care needs following discharge   - Consider OT consult to assist with ADL evaluation and planning for discharge  - Provide patient education as appropriate  Outcome: Progressing  Goal: Maintains/Returns to pre admission functional level  Description: INTERVENTIONS:  - Perform AM-PAC 6 Click Basic Mobility/ Daily Activity assessment daily.  - Set and communicate daily mobility goal to care team and patient/family/caregiver.   - Collaborate with rehabilitation services on mobility goals if consulted  - Perform Range of Motion  times a day.  - Reposition patient every  hours.  - Dangle patient  times a day  - Stand patient  times a day  - Ambulate patient  times a day  - Out of bed to chair  times a day   - Out of bed for meals  times a day  - Out of bed for toileting  - Record patient progress and toleration of activity level   Outcome: Progressing     Problem: DISCHARGE PLANNING  Goal: Discharge to home or other facility with appropriate resources  Description: INTERVENTIONS:  - Identify barriers to discharge w/patient and caregiver  - Arrange for  needed discharge resources and transportation as appropriate  - Identify discharge learning needs (meds, wound care, etc.)  - Arrange for interpretive services to assist at discharge as needed  - Refer to Case Management Department for coordinating discharge planning if the patient needs post-hospital services based on physician/advanced practitioner order or complex needs related to functional status, cognitive ability, or social support system  Outcome: Progressing     Problem: Knowledge Deficit  Goal: Patient/family/caregiver demonstrates understanding of disease process, treatment plan, medications, and discharge instructions  Description: Complete learning assessment and assess knowledge base.  Interventions:  - Provide teaching at level of understanding  - Provide teaching via preferred learning methods  Outcome: Progressing

## 2024-09-22 NOTE — ASSESSMENT & PLAN NOTE
Lab Results   Component Value Date    EGFR 46 09/22/2024    EGFR 61 09/21/2024    EGFR 53 09/20/2024    CREATININE 1.36 (H) 09/22/2024    CREATININE 1.08 09/21/2024    CREATININE 1.21 09/20/2024   Baseline creatinine 1 to 1.2  Monitor

## 2024-09-22 NOTE — ASSESSMENT & PLAN NOTE
H/o cystic fibrosis s/p bilateral lung transplant in 2013 and left lung transplant on 3/1/49158  S/p right pneumonectomy  Ct home regimen - Prednisone 15 mg daily(being tapered down to 10 mg), Tacrolimus 3.5 mg in the morning and 3 mg in the evening, Atovaquone 1500 mg daily, Azithromycin 250 mg 3 times weekly on M/W/F, Valacyclovir 500 mg daily  Transplant center at [place] updated with current status at # [phone] - goal Tacrolimus trough level 6-8  Tacrolimus level is 17+, discussed with [facility] transplant center Dr. Douglas Lema [phone], recommended hold Tacrolimus today and tomorrow, resume from Sunday night Tacrolium 2 mg BID. Repeat level Monday morning.

## 2024-09-22 NOTE — ASSESSMENT & PLAN NOTE
[facility]    Physical Therapy  Cancellation/No-show Note  Patient Name:  Reynolds Lefort  :  1971   Date:  8/10/2022  Cancelled visits to date: 2,   No-shows to date: 2    For today's appointment patient:  [x]  Cancelled  []  Rescheduled appointment  []  No-show     Reason given by patient:  []  Patient ill  []  Conflicting appointment  []  No transportation    [x]  Conflict with work  []  No reason given  []  Other:     Comments:      Phone call information:   []  Phone call made today to patient at _ time at number provided:      []  Patient answered, conversation as follows:    []  Patient did not answer, message left as follows:  []  Phone call not made today  [x]  Phone call not needed - pt contacted us to cancel and provided reason for cancellation. Electronically signed by:   Mendel Grew, PT, DPT Patient had fever of 102 this morning, ongoing.   Still has abdominal pain. No leukocytosis. Normal lactic acid.   Fever could be secondary to viral infection as attributed by sore throat, vs. Urinary source due to above.   Patient is immunosuppressed, therefore will start empiric IV rocephin.  Obtain UA with culture, BCx x 2.   Will consider CT abd/pelvis if worsening pain

## 2024-09-22 NOTE — ASSESSMENT & PLAN NOTE
/Presented to [facility] today with left flank pain since 1 day  /CT renal stone study - Left renal pelvic calculus measuring 1.5 x 0.8 x 1.2 cm. Mild fullness of the lower pole calyx.   Transferred to [facility] for urology intervention  S/p cystoscopy, ureteroscopy with lithotripsy holmium laser, retrograde pyelogram and insertion of left ureteral stent and basket stone extraction 9/20/24  Urology plans repeat CT in 2 weeks.  If minimal stone burden then stent will be removed but if large stone burden persistent then plan is for second stage ureteroscopy  Start Flomax, pyridium. Strain all urine. Bladder scan.  Will continue Eliquis for now, hgb stable 10.9.

## 2024-09-22 NOTE — PROGRESS NOTES
Progress Note - Hospitalist   Name: Maximilian Mann 48 y.o. female I MRN: [id number]  Unit/Bed#: [facility] 934-01 I Date of Admission: 9/20/2024   Date of Service: 9/22/2024 I Hospital Day: 2    Assessment & Plan  Renal calculus, left  /Presented to [facility] today with left flank pain since 1 day  /CT renal stone study - Left renal pelvic calculus measuring 1.5 x 0.8 x 1.2 cm. Mild fullness of the lower pole calyx.   [facility]  S/p cystoscopy, ureteroscopy with lithotripsy holmium laser, retrograde pyelogram and insertion of left ureteral stent and basket stone extraction 9/20/24  Urology plans repeat CT in 2 weeks.  If minimal stone burden then stent will be removed but if large stone burden persistent then plan is for second stage ureteroscopy  Start Flomax, pyridium. Strain all urine. Bladder scan.  Will continue Eliquis for now, hgb stable 10.9.   Hx of lung transplant ([facility])  H/o cystic fibrosis s/p bilateral lung transplant in 2013 and left lung transplant on 3/1/86465  S/p right pneumonectomy  Ct home regimen - Prednisone 15 mg daily(being tapered down to 10 mg), Tacrolimus 3.5 mg in the morning and 3 mg in the evening, Atovaquone 1500 mg daily, Azithromycin 250 mg 3 times weekly on M/W/F, Valacyclovir 500 mg daily  Transplant center at [place] updated with current status at # [phone] - goal Tacrolimus trough level 6-8  Tacrolimus level is 17+, discussed with [facility] transplant center Dr. Douglas Lema [phone], recommended hold Tacrolimus today and tomorrow, resume from Sunday night Tacrolium 2 mg BID. Repeat level Monday morning.   Type 1 diabetes mellitus with diabetic chronic kidney disease, unspecified CKD stage ([facility])  Lab Results   Component Value Date    HGBA1C 7.5 (H) 09/21/2024       Recent Labs     09/21/24  1707 09/21/24  2039 09/22/24  0808 09/22/24  1106   POCGLU 317* 187* 137 136       Blood Sugar Average: Last 72 hrs:  (P)  [id number] regimen: Lantus 12 units in am, Aspart 6 units TID  Drop in blood sugar to 67 this evening  Ct Lantus 12 units in am, add Humalog at 2 units TID and sliding scale insulin  Monitor blood sugars and adjust insulin accordingly    History of pulmonary embolus (PE)  Continue Eliquis 5 mg BID as patient requested due to h/o multiple PE/DVT.  Will continue, hgb stable  Asthma  Ct Symbicort, prn Albuterol  Stage 3a chronic kidney disease (HCC)  Lab Results   Component Value Date    EGFR 46 09/22/2024    EGFR 61 09/21/2024    EGFR 53 09/20/2024    CREATININE 1.36 (H) 09/22/2024    CREATININE 1.08 09/21/2024    CREATININE 1.21 09/20/2024   Baseline creatinine 1 to 1.2  Monitor  Continuous opioid dependence (HCC)  Dilaudid IV added and frequency of Oxycodone increased due to acute on chronic pain from left renal calculus  Pancreatic insufficiency  Ct Pancrealipase  Sore throat  Sore throat since 1 day  Patient was concerned for strep throat  Negative covid/flu/rsv  Strep A PCR - not detected  Symptomatic treatment    Fever  Patient had fever of 102 this morning, ongoing.   Still has abdominal pain. No leukocytosis. Normal lactic acid.   Fever could be secondary to viral infection as attributed by sore throat, vs. Urinary source due to above.   Patient is immunosuppressed, therefore will start empiric IV rocephin.  Obtain UA with culture, BCx x 2.   Will consider CT abd/pelvis if worsening pain    VTE Pharmacologic Prophylaxis: VTE Score: 6 High Risk (Score >/= 5) - Pharmacological DVT Prophylaxis Ordered: apixaban (Eliquis). Sequential Compression Devices Ordered.    Mobility:   Basic Mobility Inpatient Raw Score: 24  JH-HLM Goal: 8: Walk 250 feet or more  JH-HLM Achieved: 7: Walk 25 feet or more  JH-HLM Goal achieved. Continue to encourage appropriate mobility.    Patient Centered Rounds: I performed bedside rounds with nursing staff today.        Current Length of Stay: 2 day(s)  Current Patient  Status: Inpatient   Certification Statement: The patient will continue to require additional inpatient hospital stay due to fever  Discharge Plan: Anticipate discharge in 48 hrs to home.    Code Status: Level 1 - Full Code    Subjective   Patient had fever of 102 this morning, ongoing.   Still has abdominal pain from the kidney stone. No leukocytosis. Normal lactic acid.     Objective     Vitals:   Temp (24hrs), Av.3 °F (38.5 °C), Min:99.2 °F (37.3 °C), Max:102.9 °F (39.4 °C)    Temp:  [99.2 °F (37.3 °C)-102.9 °F (39.4 °C)] 101.2 °F (38.4 °C)  HR:  [81-92] 92  Resp:  [14-15] 14  BP: (127-141)/(70-74) 141/74  SpO2:  [91 %-98 %] 93 %  Body mass index is 22.82 kg/m².     Input and Output Summary (last 24 hours):     Intake/Output Summary (Last 24 hours) at 2024 1242  Last data filed at 2024 0635  Gross per 24 hour   Intake 1440 ml   Output 1800 ml   Net -360 ml       Physical Exam  Constitutional:       General: She is in acute distress.      Appearance: She is well-developed. She is ill-appearing.   HENT:      Head: Normocephalic and atraumatic.      Right Ear: External ear normal.      Left Ear: External ear normal.      Nose: Nose normal.   Eyes:      Conjunctiva/sclera: Conjunctivae normal.   Cardiovascular:      Heart sounds: Normal heart sounds.   Pulmonary:      Effort: Pulmonary effort is normal.      Breath sounds: Normal breath sounds.   Abdominal:      General: Bowel sounds are normal.      Palpations: Abdomen is soft.      Tenderness: There is abdominal tenderness.   Genitourinary:     Comments: deferred  Skin:     General: Skin is warm and dry.   Neurological:      Mental Status: She is alert.      Comments: Cranial nerve 2 -12 are normal.          Lines/Drains:  Lines/Drains/Airways       Active Status       Name Placement date Placement time Site Days    Port A Cath 22 Right Chest 22  0953  Chest  667    Port A Cath 22 Right Chest 22  1201  Chest  666                     Central Line:  Goal for removal: Port accessed. Will de-access as appropriate.               Lab Results: I have reviewed the following results: none   Results from last 7 days   Lab Units 09/22/24 0449 09/21/24  0435   WBC Thousand/uL 6.57 8.57   HEMOGLOBIN g/dL 10.5* 11.9   HEMATOCRIT % 34.7* 39.4   PLATELETS Thousands/uL 136* 147*   SEGS PCT %  --  76*   LYMPHO PCT %  --  15   MONO PCT %  --  9   EOS PCT %  --  0     Results from last 7 days   Lab Units 09/22/24  0449 09/21/24  0435 09/20/24  0748   SODIUM mmol/L 134*   < > 136   POTASSIUM mmol/L 4.1   < > 4.3   CHLORIDE mmol/L 97   < > 97   CO2 mmol/L 31   < > 33*   BUN mg/dL 29*   < > 31*   CREATININE mg/dL 1.36*   < > 1.21   ANION GAP mmol/L 6   < > 6   CALCIUM mg/dL 8.4   < > 8.9   ALBUMIN g/dL  --   --  3.8   TOTAL BILIRUBIN mg/dL  --   --  0.62   ALK PHOS U/L  --   --  74   ALT U/L  --   --  18   AST U/L  --   --  11*   GLUCOSE RANDOM mg/dL 152*   < > 144*    < > = values in this interval not displayed.         Results from last 7 days   Lab Units 09/22/24  1106 09/22/24  0808 09/21/24  2039 09/21/24  1707 09/21/24  1204 09/21/24  0803 09/20/24  2052 09/20/24  1845 09/20/24  1557   POC GLUCOSE mg/dl 136 137 187* 317* 264* 123 146* 67 78     Results from last 7 days   Lab Units 09/21/24  0435   HEMOGLOBIN A1C % 7.5*     Results from last 7 days   Lab Units 09/22/24  0927 09/20/24  0748   LACTIC ACID mmol/L 1.2 0.9       Recent Cultures (last 7 days):   Results from last 7 days   Lab Units 09/20/24  1452   URINE CULTURE  60,000-69,000 cfu/ml Candida albicans*          Last 24 Hours Medication List:     Current Facility-Administered Medications:     acetaminophen (TYLENOL) tablet 975 mg, Q6H PRN    albuterol (PROVENTIL HFA,VENTOLIN HFA) inhaler 2 puff, Q4H PRN    albuterol inhalation solution 5 mg, BID    ALPRAZolam (XANAX) tablet 1 mg, HS PRN    apixaban (ELIQUIS) tablet 5 mg, BID    ascorbic acid (VITAMIN C) tablet 125 mg, Daily    atovaquone (MEPRON)  oral suspension 1,500 mg, Daily    azithromycin (ZITHROMAX) tablet 250 mg, Once per day on Monday Wednesday Friday    budesonide-formoterol (SYMBICORT) 160-4.5 mcg/act inhaler 2 puff, BID    cefTRIAXone (ROCEPHIN) 1,000 mg in dextrose 5 % 50 mL IVPB, Q24H, Last Rate: 1,000 mg (09/22/24 0941)    Cholecalciferol (VITAMIN D3) tablet 5,000 Units, Daily    cyanocobalamin (VITAMIN B-12) tablet 1,000 mcg, Daily    dextromethorphan-guaiFENesin (ROBITUSSIN DM) oral syrup 5 mL, Q6H PRN    famotidine (PEPCID) tablet 40 mg, HS    furosemide (LASIX) tablet 40 mg, Daily    HYDROmorphone (DILAUDID) injection 1 mg, Q4H PRN    insulin glargine (LANTUS) subcutaneous injection 12 Units 0.12 mL, QAM    insulin lispro (HumALOG/ADMELOG) 100 units/mL subcutaneous injection 1-5 Units, TID AC **AND** Fingerstick Glucose (POCT), TID AC    insulin lispro (HumALOG/ADMELOG) 100 units/mL subcutaneous injection 1-5 Units, HS    insulin lispro (HumALOG/ADMELOG) 100 units/mL subcutaneous injection 2 Units, TID With Meals    magnesium Oxide (MAG-OX) tablet 400 mg, Daily    ondansetron (ZOFRAN-ODT) dispersible tablet 4 mg, Q6H PRN    oxybutynin (DITROPAN) tablet 5 mg, TID    oxyCODONE (ROXICODONE) IR tablet 5 mg, Q4H PRN **OR** oxyCODONE (ROXICODONE) immediate release tablet 10 mg, Q4H PRN    pancrelipase (Lip-Prot-Amyl) (CREON) delayed release capsule 48,000 Units, TID With Meals    pantoprazole (PROTONIX) EC tablet 40 mg, Early Morning    phenazopyridine (PYRIDIUM) tablet 100 mg, TID With Meals    predniSONE tablet 15 mg, Daily    pregabalin (LYRICA) capsule 150 mg, BID    tacrolimus (PROGRAF) capsule 2 mg, Q12H DOTTY    tamsulosin (FLOMAX) capsule 0.4 mg, Daily With Dinner    valACYclovir (VALTREX) tablet 500 mg, Daily    Administrative Statements   Today, Patient Was Seen By: Trey Akers MD      **Please Note: This note may have been constructed using a voice recognition system.**

## 2024-09-22 NOTE — ASSESSMENT & PLAN NOTE
Sore throat since 1 day  Patient was concerned for strep throat  Negative covid/flu/rsv  Strep A PCR - not detected  Symptomatic treatment

## 2024-09-22 NOTE — ASSESSMENT & PLAN NOTE
Lab Results   Component Value Date    HGBA1C 7.5 (H) 09/21/2024       Recent Labs     09/21/24  1707 09/21/24 2039 09/22/24  0808 09/22/24  1106   POCGLU 317* 187* 137 136       Blood Sugar Average: Last 72 hrs:  (P) [id number] regimen: Lantus 12 units in am, Aspart 6 units TID  Drop in blood sugar to 67 this evening  Ct Lantus 12 units in am, add Humalog at 2 units TID and sliding scale insulin  Monitor blood sugars and adjust insulin accordingly

## 2024-09-22 NOTE — ASSESSMENT & PLAN NOTE
Continue Eliquis 5 mg BID as patient requested due to h/o multiple PE/DVT.  Will continue, hgb stable

## 2024-09-22 NOTE — PLAN OF CARE
Problem: PAIN - ADULT  Goal: Verbalizes/displays adequate comfort level or baseline comfort level  Description: Interventions:  - Encourage patient to monitor pain and request assistance  - Assess pain using appropriate pain scale  - Administer analgesics based on type and severity of pain and evaluate response  - Implement non-pharmacological measures as appropriate and evaluate response  - Consider cultural and social influences on pain and pain management  - Notify physician/advanced practitioner if interventions unsuccessful or patient reports new pain  9/22/2024 0722 by Connie Juarez RN  Outcome: Progressing  9/21/2024 1835 by Connie Juarez RN  Outcome: Progressing     Problem: INFECTION - ADULT  Goal: Absence or prevention of progression during hospitalization  Description: INTERVENTIONS:  - Assess and monitor for signs and symptoms of infection  - Monitor lab/diagnostic results  - Monitor all insertion sites, i.e. indwelling lines, tubes, and drains  - Monitor endotracheal if appropriate and nasal secretions for changes in amount and color  - [place] appropriate cooling/warming therapies per order  - Administer medications as ordered  - Instruct and encourage patient and family to use good hand hygiene technique  - Identify and instruct in appropriate isolation precautions for identified infection/condition  9/22/2024 0722 by Connie Juarez RN  Outcome: Progressing  9/21/2024 1835 by Connie Juarez RN  Outcome: Progressing  Goal: Absence of fever/infection during neutropenic period  Description: INTERVENTIONS:  - Monitor WBC    9/22/2024 0722 by Connie Juarez RN  Outcome: Progressing  9/21/2024 1835 by Connie Juarez RN  Outcome: Progressing     Problem: SAFETY ADULT  Goal: Patient will remain free of falls  Description: INTERVENTIONS:  - Educate patient/family on patient safety including physical limitations  - Instruct patient to call for assistance with activity   - Consult OT/PT to assist with  strengthening/mobility   - Keep Call bell within reach  - Keep bed low and locked with side rails adjusted as appropriate  - Keep care items and personal belongings within reach  - Initiate and maintain comfort rounds  - Make Fall Risk Sign visible to staff  - Apply yellow socks and bracelet for high fall risk patients  - Consider moving patient to room near nurses station  9/22/2024 0722 by Connie Juarez RN  Outcome: Progressing  9/21/2024 1835 by Connie Juarez RN  Outcome: Progressing  Goal: Maintain or return to baseline ADL function  Description: INTERVENTIONS:  -  Assess patient's ability to carry out ADLs; assess patient's baseline for ADL function and identify physical deficits which impact ability to perform ADLs (bathing, care of mouth/teeth, toileting, grooming, dressing, etc.)  - Assess/evaluate cause of self-care deficits   - Assess range of motion  - Assess patient's mobility; develop plan if impaired  - Assess patient's need for assistive devices and provide as appropriate  - Encourage maximum independence but intervene and supervise when necessary  - Involve family in performance of ADLs  - Assess for home care needs following discharge   - Consider OT consult to assist with ADL evaluation and planning for discharge  - Provide patient education as appropriate  9/22/2024 0722 by Connie Juarez RN  Outcome: Progressing  9/21/2024 1835 by Connie Juarez RN  Outcome: Progressing  Goal: Maintains/Returns to pre admission functional level  Description: INTERVENTIONS:  - Perform AM-PAC 6 Click Basic Mobility/ Daily Activity assessment daily.  - Set and communicate daily mobility goal to care team and patient/family/caregiver.   - Collaborate with rehabilitation services on mobility goals if consulted  - Out of bed for toileting  - Record patient progress and toleration of activity level   9/22/2024 0722 by Connie Juarez RN  Outcome: Progressing  9/21/2024 1835 by Connie Juarez RN  Outcome: Progressing      Problem: DISCHARGE PLANNING  Goal: Discharge to home or other facility with appropriate resources  Description: INTERVENTIONS:  - Identify barriers to discharge w/patient and caregiver  - Arrange for needed discharge resources and transportation as appropriate  - Identify discharge learning needs (meds, wound care, etc.)  - Arrange for interpretive services to assist at discharge as needed  - Refer to Case Management Department for coordinating discharge planning if the patient needs post-hospital services based on physician/advanced practitioner order or complex needs related to functional status, cognitive ability, or social support system  9/22/2024 0722 by Connie Juarez RN  Outcome: Progressing  9/21/2024 1835 by Connie Juarez RN  Outcome: Progressing     Problem: Knowledge Deficit  Goal: Patient/family/caregiver demonstrates understanding of disease process, treatment plan, medications, and discharge instructions  Description: Complete learning assessment and assess knowledge base.  Interventions:  - Provide teaching at level of understanding  - Provide teaching via preferred learning methods  9/22/2024 0722 by Connie Juarez RN  Outcome: Progressing  9/21/2024 1835 by Connie Juarez RN  Outcome: Progressing

## 2024-09-23 ENCOUNTER — APPOINTMENT (INPATIENT)
Dept: RADIOLOGY | Facility: HOSPITAL | Age: 49
DRG: 660 | End: 2024-09-23
Payer: COMMERCIAL

## 2024-09-23 LAB
ANION GAP SERPL CALCULATED.3IONS-SCNC: 6 MMOL/L (ref 4–13)
BACTERIA UR CULT: ABNORMAL
BACTERIA UR CULT: ABNORMAL
BUN SERPL-MCNC: 36 MG/DL (ref 5–25)
CALCIUM SERPL-MCNC: 8.5 MG/DL (ref 8.4–10.2)
CHLORIDE SERPL-SCNC: 96 MMOL/L (ref 96–108)
CO2 SERPL-SCNC: 33 MMOL/L (ref 21–32)
CREAT SERPL-MCNC: 1.37 MG/DL (ref 0.6–1.3)
ERYTHROCYTE [DISTWIDTH] IN BLOOD BY AUTOMATED COUNT: 14.6 % (ref 11.6–15.1)
GFR SERPL CREATININE-BSD FRML MDRD: 46 ML/MIN/1.73SQ M
GLUCOSE SERPL-MCNC: 181 MG/DL (ref 65–140)
GLUCOSE SERPL-MCNC: 186 MG/DL (ref 65–140)
GLUCOSE SERPL-MCNC: 192 MG/DL (ref 65–140)
GLUCOSE SERPL-MCNC: 229 MG/DL (ref 65–140)
GLUCOSE SERPL-MCNC: 287 MG/DL (ref 65–140)
HCT VFR BLD AUTO: 32.9 % (ref 34.8–46.1)
HGB BLD-MCNC: 10.2 G/DL (ref 11.5–15.4)
MCH RBC QN AUTO: 29.7 PG (ref 26.8–34.3)
MCHC RBC AUTO-ENTMCNC: 31 G/DL (ref 31.4–37.4)
MCV RBC AUTO: 96 FL (ref 82–98)
PLATELET # BLD AUTO: 130 THOUSANDS/UL (ref 149–390)
PMV BLD AUTO: 10.3 FL (ref 8.9–12.7)
POTASSIUM SERPL-SCNC: 3.7 MMOL/L (ref 3.5–5.3)
RBC # BLD AUTO: 3.44 MILLION/UL (ref 3.81–5.12)
SODIUM SERPL-SCNC: 135 MMOL/L (ref 135–147)
TACROLIMUS BLD-MCNC: 7.3 NG/ML (ref 3–15)
WBC # BLD AUTO: 6.66 THOUSAND/UL (ref 4.31–10.16)

## 2024-09-23 PROCEDURE — 99255 IP/OBS CONSLTJ NEW/EST HI 80: CPT | Performed by: PHYSICIAN ASSISTANT

## 2024-09-23 PROCEDURE — 94640 AIRWAY INHALATION TREATMENT: CPT

## 2024-09-23 PROCEDURE — 99233 SBSQ HOSP IP/OBS HIGH 50: CPT | Performed by: FAMILY MEDICINE

## 2024-09-23 PROCEDURE — 74176 CT ABD & PELVIS W/O CONTRAST: CPT

## 2024-09-23 PROCEDURE — 82948 REAGENT STRIP/BLOOD GLUCOSE: CPT

## 2024-09-23 PROCEDURE — 80048 BASIC METABOLIC PNL TOTAL CA: CPT | Performed by: FAMILY MEDICINE

## 2024-09-23 PROCEDURE — 94760 N-INVAS EAR/PLS OXIMETRY 1: CPT

## 2024-09-23 PROCEDURE — 94664 DEMO&/EVAL PT USE INHALER: CPT

## 2024-09-23 PROCEDURE — 85027 COMPLETE CBC AUTOMATED: CPT | Performed by: FAMILY MEDICINE

## 2024-09-23 PROCEDURE — 80197 ASSAY OF TACROLIMUS: CPT | Performed by: FAMILY MEDICINE

## 2024-09-23 RX ORDER — LORAZEPAM 2 MG/ML
1 INJECTION INTRAMUSCULAR
Status: DISCONTINUED | OUTPATIENT
Start: 2024-09-23 | End: 2024-09-25

## 2024-09-23 RX ORDER — HYDROMORPHONE HCL/PF 1 MG/ML
0.5 SYRINGE (ML) INJECTION ONCE
Status: COMPLETED | OUTPATIENT
Start: 2024-09-23 | End: 2024-09-23

## 2024-09-23 RX ORDER — HALOPERIDOL 5 MG/ML
2 INJECTION INTRAMUSCULAR
Status: DISCONTINUED | OUTPATIENT
Start: 2024-09-23 | End: 2024-09-25

## 2024-09-23 RX ORDER — GLYCOPYRROLATE 0.2 MG/ML
0.2 INJECTION INTRAMUSCULAR; INTRAVENOUS EVERY 4 HOURS PRN
Status: DISCONTINUED | OUTPATIENT
Start: 2024-09-23 | End: 2024-09-25

## 2024-09-23 RX ADMIN — TACROLIMUS 2 MG: 1 CAPSULE ORAL at 21:43

## 2024-09-23 RX ADMIN — AZITHROMYCIN DIHYDRATE 250 MG: 250 TABLET ORAL at 08:19

## 2024-09-23 RX ADMIN — INSULIN LISPRO 2 UNITS: 100 INJECTION, SOLUTION INTRAVENOUS; SUBCUTANEOUS at 17:10

## 2024-09-23 RX ADMIN — CEFTRIAXONE SODIUM 1000 MG: 10 INJECTION, POWDER, FOR SOLUTION INTRAVENOUS at 08:37

## 2024-09-23 RX ADMIN — PHENAZOPYRIDINE HYDROCHLORIDE 100 MG: 100 TABLET ORAL at 11:46

## 2024-09-23 RX ADMIN — PANCRELIPASE 48000 UNITS: 120000; 24000; 76000 CAPSULE, DELAYED RELEASE PELLETS ORAL at 08:27

## 2024-09-23 RX ADMIN — HYDROMORPHONE HYDROCHLORIDE 1 MG: 1 INJECTION, SOLUTION INTRAMUSCULAR; INTRAVENOUS; SUBCUTANEOUS at 05:13

## 2024-09-23 RX ADMIN — PREGABALIN 150 MG: 75 CAPSULE ORAL at 08:17

## 2024-09-23 RX ADMIN — OXYBUTYNIN CHLORIDE 5 MG: 5 TABLET ORAL at 08:20

## 2024-09-23 RX ADMIN — HYDROMORPHONE HYDROCHLORIDE 1 MG: 1 INJECTION, SOLUTION INTRAMUSCULAR; INTRAVENOUS; SUBCUTANEOUS at 23:49

## 2024-09-23 RX ADMIN — INSULIN LISPRO 2 UNITS: 100 INJECTION, SOLUTION INTRAVENOUS; SUBCUTANEOUS at 21:42

## 2024-09-23 RX ADMIN — CYANOCOBALAMIN TAB 500 MCG 1000 MCG: 500 TAB at 08:18

## 2024-09-23 RX ADMIN — OXYCODONE HYDROCHLORIDE 10 MG: 10 TABLET ORAL at 11:44

## 2024-09-23 RX ADMIN — PANCRELIPASE 48000 UNITS: 120000; 24000; 76000 CAPSULE, DELAYED RELEASE PELLETS ORAL at 17:11

## 2024-09-23 RX ADMIN — PHENAZOPYRIDINE HYDROCHLORIDE 100 MG: 100 TABLET ORAL at 08:24

## 2024-09-23 RX ADMIN — APIXABAN 5 MG: 5 TABLET, FILM COATED ORAL at 17:10

## 2024-09-23 RX ADMIN — OXYCODONE HYDROCHLORIDE 10 MG: 10 TABLET ORAL at 17:16

## 2024-09-23 RX ADMIN — FUROSEMIDE 40 MG: 40 TABLET ORAL at 08:19

## 2024-09-23 RX ADMIN — Medication 5000 UNITS: at 08:15

## 2024-09-23 RX ADMIN — HYDROMORPHONE HYDROCHLORIDE 1 MG: 1 INJECTION, SOLUTION INTRAMUSCULAR; INTRAVENOUS; SUBCUTANEOUS at 15:12

## 2024-09-23 RX ADMIN — BUDESONIDE AND FORMOTEROL FUMARATE DIHYDRATE 2 PUFF: 160; 4.5 AEROSOL RESPIRATORY (INHALATION) at 08:31

## 2024-09-23 RX ADMIN — FAMOTIDINE 40 MG: 20 TABLET, FILM COATED ORAL at 21:41

## 2024-09-23 RX ADMIN — SODIUM CHLORIDE SOLN NEBU 3% 4 ML: 3 NEBU SOLN at 08:16

## 2024-09-23 RX ADMIN — PHENAZOPYRIDINE HYDROCHLORIDE 100 MG: 100 TABLET ORAL at 17:12

## 2024-09-23 RX ADMIN — KETAMINE HYDROCHLORIDE 0.1 MG/KG/HR: 100 INJECTION INTRAMUSCULAR; INTRAVENOUS at 16:39

## 2024-09-23 RX ADMIN — ATOVAQUONE 1500 MG: 750 SUSPENSION ORAL at 08:28

## 2024-09-23 RX ADMIN — Medication 125 MG: at 08:24

## 2024-09-23 RX ADMIN — OXYBUTYNIN CHLORIDE 5 MG: 5 TABLET ORAL at 17:10

## 2024-09-23 RX ADMIN — HYDROMORPHONE HYDROCHLORIDE 1 MG: 1 INJECTION, SOLUTION INTRAMUSCULAR; INTRAVENOUS; SUBCUTANEOUS at 09:51

## 2024-09-23 RX ADMIN — INSULIN LISPRO 2 UNITS: 100 INJECTION, SOLUTION INTRAVENOUS; SUBCUTANEOUS at 08:37

## 2024-09-23 RX ADMIN — VALACYCLOVIR HYDROCHLORIDE 500 MG: 500 TABLET, FILM COATED ORAL at 08:30

## 2024-09-23 RX ADMIN — PREDNISONE 15 MG: 5 TABLET ORAL at 08:18

## 2024-09-23 RX ADMIN — SODIUM CHLORIDE SOLN NEBU 3% 4 ML: 3 NEBU SOLN at 19:13

## 2024-09-23 RX ADMIN — OXYBUTYNIN CHLORIDE 5 MG: 5 TABLET ORAL at 21:41

## 2024-09-23 RX ADMIN — INSULIN LISPRO 2 UNITS: 100 INJECTION, SOLUTION INTRAVENOUS; SUBCUTANEOUS at 17:09

## 2024-09-23 RX ADMIN — INSULIN LISPRO 1 UNITS: 100 INJECTION, SOLUTION INTRAVENOUS; SUBCUTANEOUS at 08:36

## 2024-09-23 RX ADMIN — LEVALBUTEROL HYDROCHLORIDE 1.25 MG: 1.25 SOLUTION RESPIRATORY (INHALATION) at 19:13

## 2024-09-23 RX ADMIN — OXYCODONE HYDROCHLORIDE 10 MG: 10 TABLET ORAL at 06:57

## 2024-09-23 RX ADMIN — HYDROMORPHONE HYDROCHLORIDE 1 MG: 1 INJECTION, SOLUTION INTRAMUSCULAR; INTRAVENOUS; SUBCUTANEOUS at 19:19

## 2024-09-23 RX ADMIN — OXYCODONE HYDROCHLORIDE 10 MG: 10 TABLET ORAL at 02:38

## 2024-09-23 RX ADMIN — LEVALBUTEROL HYDROCHLORIDE 1.25 MG: 1.25 SOLUTION RESPIRATORY (INHALATION) at 08:16

## 2024-09-23 RX ADMIN — INSULIN LISPRO 2 UNITS: 100 INJECTION, SOLUTION INTRAVENOUS; SUBCUTANEOUS at 11:46

## 2024-09-23 RX ADMIN — PREGABALIN 150 MG: 75 CAPSULE ORAL at 17:10

## 2024-09-23 RX ADMIN — TACROLIMUS 2 MG: 1 CAPSULE ORAL at 08:27

## 2024-09-23 RX ADMIN — INSULIN LISPRO 1 UNITS: 100 INJECTION, SOLUTION INTRAVENOUS; SUBCUTANEOUS at 11:46

## 2024-09-23 RX ADMIN — OXYCODONE HYDROCHLORIDE 10 MG: 10 TABLET ORAL at 21:41

## 2024-09-23 RX ADMIN — BUDESONIDE AND FORMOTEROL FUMARATE DIHYDRATE 2 PUFF: 160; 4.5 AEROSOL RESPIRATORY (INHALATION) at 17:10

## 2024-09-23 RX ADMIN — HYDROMORPHONE HYDROCHLORIDE 0.5 MG: 1 INJECTION, SOLUTION INTRAMUSCULAR; INTRAVENOUS; SUBCUTANEOUS at 08:54

## 2024-09-23 RX ADMIN — PANTOPRAZOLE SODIUM 40 MG: 40 TABLET, DELAYED RELEASE ORAL at 05:13

## 2024-09-23 RX ADMIN — APIXABAN 5 MG: 5 TABLET, FILM COATED ORAL at 08:19

## 2024-09-23 RX ADMIN — TAMSULOSIN HYDROCHLORIDE 0.4 MG: 0.4 CAPSULE ORAL at 17:10

## 2024-09-23 RX ADMIN — MAGNESIUM OXIDE TAB 400 MG (241.3 MG ELEMENTAL MG) 400 MG: 400 (241.3 MG) TAB at 08:20

## 2024-09-23 RX ADMIN — HYDROMORPHONE HYDROCHLORIDE 1 MG: 1 INJECTION, SOLUTION INTRAMUSCULAR; INTRAVENOUS; SUBCUTANEOUS at 00:26

## 2024-09-23 RX ADMIN — INSULIN GLARGINE 12 UNITS: 100 INJECTION, SOLUTION SUBCUTANEOUS at 08:36

## 2024-09-23 NOTE — PLAN OF CARE
Problem: PAIN - ADULT  Goal: Verbalizes/displays adequate comfort level or baseline comfort level  Description: Interventions:  - Encourage patient to monitor pain and request assistance  - Assess pain using appropriate pain scale  - Administer analgesics based on type and severity of pain and evaluate response  - Implement non-pharmacological measures as appropriate and evaluate response  - Consider cultural and social influences on pain and pain management  - Notify physician/advanced practitioner if interventions unsuccessful or patient reports new pain  Outcome: Progressing     Problem: INFECTION - ADULT  Goal: Absence or prevention of progression during hospitalization  Description: INTERVENTIONS:  - Assess and monitor for signs and symptoms of infection  - Monitor lab/diagnostic results  - Monitor all insertion sites, i.e. indwelling lines, tubes, and drains  - Monitor endotracheal if appropriate and nasal secretions for changes in amount and color  - [place] appropriate cooling/warming therapies per order  - Administer medications as ordered  - Instruct and encourage patient and family to use good hand hygiene technique  - Identify and instruct in appropriate isolation precautions for identified infection/condition  Outcome: Progressing  Goal: Absence of fever/infection during neutropenic period  Description: INTERVENTIONS:  - Monitor WBC    Outcome: Progressing     Problem: SAFETY ADULT  Goal: Patient will remain free of falls  Description: INTERVENTIONS:  - Educate patient/family on patient safety including physical limitations  - Instruct patient to call for assistance with activity   - Consult OT/PT to assist with strengthening/mobility   - Keep Call bell within reach  - Keep bed low and locked with side rails adjusted as appropriate  - Keep care items and personal belongings within reach  - Initiate and maintain comfort rounds  - Make Fall Risk Sign visible to staff  - Offer Toileting every 2 Hours,  in advance of need  - Initiate/Maintain bed alarm  - Obtain necessary fall risk management equipment: alarms  - Apply yellow socks and bracelet for high fall risk patients  - Consider moving patient to room near nurses station  Outcome: Progressing  Goal: Maintain or return to baseline ADL function  Description: INTERVENTIONS:  -  Assess patient's ability to carry out ADLs; assess patient's baseline for ADL function and identify physical deficits which impact ability to perform ADLs (bathing, care of mouth/teeth, toileting, grooming, dressing, etc.)  - Assess/evaluate cause of self-care deficits   - Assess range of motion  - Assess patient's mobility; develop plan if impaired  - Assess patient's need for assistive devices and provide as appropriate  - Encourage maximum independence but intervene and supervise when necessary  - Involve family in performance of ADLs  - Assess for home care needs following discharge   - Consider OT consult to assist with ADL evaluation and planning for discharge  - Provide patient education as appropriate  Outcome: Progressing  Goal: Maintains/Returns to pre admission functional level  Description: INTERVENTIONS:  - Perform AM-PAC 6 Click Basic Mobility/ Daily Activity assessment daily.  - Set and communicate daily mobility goal to care team and patient/family/caregiver.   - Collaborate with rehabilitation services on mobility goals if consulted  - Perform Range of Motion 3 times a day.  - Stand patient 3 times a day  - Ambulate patient 3 times a day  - Out of bed to chair 3 times a day   - Out of bed for meals 3 times a day  - Out of bed for toileting  - Record patient progress and toleration of activity level   Outcome: Progressing     Problem: DISCHARGE PLANNING  Goal: Discharge to home or other facility with appropriate resources  Description: INTERVENTIONS:  - Identify barriers to discharge w/patient and caregiver  - Arrange for needed discharge resources and transportation as  appropriate  - Identify discharge learning needs (meds, wound care, etc.)  - Arrange for interpretive services to assist at discharge as needed  - Refer to Case Management Department for coordinating discharge planning if the patient needs post-hospital services based on physician/advanced practitioner order or complex needs related to functional status, cognitive ability, or social support system  Outcome: Progressing     Problem: Knowledge Deficit  Goal: Patient/family/caregiver demonstrates understanding of disease process, treatment plan, medications, and discharge instructions  Description: Complete learning assessment and assess knowledge base.  Interventions:  - Provide teaching at level of understanding  - Provide teaching via preferred learning methods  Outcome: Progressing

## 2024-09-23 NOTE — ASSESSMENT & PLAN NOTE
Lyrica 150 mg p.o. twice daily.  Tylenol 975 mg p.o. every 6 hours as needed mild pain.  Oxycodone 5 mg p.o. every 4 hours as needed moderate pain or 10 mg p.o. every 4 hours as needed severe pain.  Dilaudid 1 mg IV every 4 hours as needed breakthrough pain.  Will plan to decrease the dose of this tomorrow.  Add ketamine infusion at 0.1 mg/kg/h IV continuous.  Add as needed glycopyrrolate, Haldol, Ativan to mitigate any potential side effects from ketamine infusion.  Continue heating pad as needed.  Add as needed Narcan in the event that opioid reversal is necessary.  Bowel regimen to avoid opioid-induced constipation while on opioid pain medication.

## 2024-09-23 NOTE — UTILIZATION REVIEW
"Unable to submit authorization request through the [employer] portal due to portal issues    NOTIFICATION OF INPATIENT ADMISSION   AUTHORIZATION REQUEST   SERVICING FACILITY:   [facility]  Address: [address] [place] [address]  Tax ID: [id number]  NPI: [id number] ATTENDING PROVIDER:  Attending Name and NPI#: Trey Akers Md [[id number]]  Address: [address] [place] [address]  Phone: [phone]   ADMISSION INFORMATION:  Place of Service: Inpatient [facility]  Place of Service Code: 21  Inpatient Admission Date/Time: 9/20/24  5:49 PM  Discharge Date/Time: No discharge date for patient encounter.  Admitting Diagnosis Code/Description:  Nephrolithiasis [N20.0]     UTILIZATION REVIEW CONTACT:  Barbara \"Ania\" Chase Utilization   Network Utilization Review Department  Phone: [phone]  Fax: [phone]  Email: [email or web address]  Contact for approvals/pending authorizations, clinical reviews, and discharge.     PHYSICIAN ADVISORY SERVICES:  Medical Necessity Denial & [id number] Review  Phone: [phone]  Fax: [phone]  Email: [email or web address]     DISCHARGE SUPPORT TEAM:  For Patients Discharge Needs & Updates  Phone: [phone] opt. 2 Fax: [phone]  Email: [email or web address]      "

## 2024-09-23 NOTE — ASSESSMENT & PLAN NOTE
"Patient is prescribed oxycodone 10 mg, 180 tablets for 30 days.  Patient gets this prescription once a month.  Patient fills this prescription monthly.  Patient states she takes only 1 or 2 tablets of this per month.  States that she fills the prescription because the insurance company will \"cut her off\" if she stops filling the prescription and, if she were to need it again, will that her insurance would not cover it.  Will contact patient's primary provider/prescriber to advise him of this.  "

## 2024-09-23 NOTE — ASSESSMENT & PLAN NOTE
Lab Results   Component Value Date    HGBA1C 7.5 (H) 09/21/2024       Recent Labs     09/22/24  1612 09/22/24  2104 09/23/24  0810 09/23/24  1129   POCGLU 358* 196* 181* 186*       Blood Sugar Average: Last 72 hrs:  (P) [id number] regimen: Lantus 12 units in am, Aspart 6 units TID  Ct Lantus 12 units in am, add Humalog at 2 units TID and sliding scale insulin  Monitor blood sugars and adjust insulin accordingly

## 2024-09-23 NOTE — ASSESSMENT & PLAN NOTE
Lab Results   Component Value Date    EGFR 46 09/23/2024    EGFR 46 09/22/2024    EGFR 61 09/21/2024    CREATININE 1.37 (H) 09/23/2024    CREATININE 1.36 (H) 09/22/2024    CREATININE 1.08 09/21/2024   Estimated Creatinine Clearance: 40.6 mL/min (A) (by C-G formula based on SCr of 1.37 mg/dL (H)).  Will attempt to minimize nephrotoxic pain medications.

## 2024-09-23 NOTE — UTILIZATION REVIEW
Initial Clinical Review    Admission: Date/Time/Statement:   Admission Orders (From admission, onward)       Ordered        09/20/24 1749  Inpatient Admission  Once                          Orders Placed This Encounter   Procedures    Inpatient Admission     Standing Status:   Standing     Number of Occurrences:   1     Order Specific Question:   Level of Care     Answer:   Med Surg [16]     Order Specific Question:   Estimated length of stay     Answer:   More than 2 Midnights     Order Specific Question:   Certification     Answer:   I certify that inpatient services are medically necessary for this patient for a duration of greater than two midnights. See H&P and MD Progress Notes for additional information about the patient's course of treatment.     Initial Presentation: 48 y.o. female with PMHx includes lung transplant, type 1 diabetes, CKD 3, GERD, DVT, PE, asthma, cystic fibrosis, pancreatic insufficiency, chronic sinusitis, gastroparesis, status post right pneumonectomy, who presented initially to [facility] then transferred to [facility], admitted Inpatient status dt Left Renal calculus.  Presented due to left flank pain.  She also complains of a sore throat since last night and has chronic dry cough.  CT renal stone study revealed left renal pelvic calculus measuring 1.5 x 0.8 x 1.2 cm. Mild fullness of the lower pole calyx.  Transferred to [facility] for urology intervention.    Plan:  Admit to med surg :  Urology consult. Hx of lung transplant, FU on Tacrolimus level. Start accuchecks w/ SSI, pain control. IO, SCDs.     Anticipated Length of Stay/Certification Statement: Patient will be admitted on an inpatient basis with an anticipated length of stay of greater than 2 midnights secondary to left renal calculus s/p stent with severe pain..     9/20 Per Urology: It appears the patient's [facility] transplant team is comfortable with her having surgery at [facility] as she has had  before. Plan for transfer and I will discuss with her attempting ureteroscopy with lithotripsy but knowing this may require more than 1 surgery given her large stone burden versus placing a stent and attempting to treat all of her stone at a future date when we have the benefit of a larger access sheath and CVAC generation 2.    9/20 OP Note:  Procedure(s):  Left - CYSTOSCOPY URETEROSCOPY WITH LITHOTRIPSY HOLMIUM LASER. RETROGRADE PYELOGRAM AND INSERTION STENT URETERAL. BASKET STONE EXTRACTION  Anesthesia Type: General  Operative Indications:  Recurrent nephrolithiasis as well as cystic fibrosis and bilateral lung transplant who presented with severe left-sided flank pain with imaging showing a 1.5 cm left renal stone with lower pole hydronephrosis.    Operative Findings:  Hard brown renal stone obstructing the left renal pelvis and lower pole  Aptra one-time use digital flexible ureteroscope used given patient's large hard stone  Significant volume of inflammatory tissue and debris in the lower pole that was evacuated after stone was partially fragmented.  Large stone took significant amount of energy and time to fragment and basket extract.  Retrograde pyelogram end of procedure looked unremarkable     Date: 9/21   Day 2:  Per Urology: Reports to still be some tenderness. May provide Flomax, oxybutynin, Pyridium renal function permitting as needed for any stent colic. FU with Urology in 2 weeks for repeat CT scan and possible stent removal.     ED Triage Vitals   Temperature Pulse Respirations Blood Pressure SpO2 Pain Score   09/20/24 1553 09/20/24 1553 09/20/24 1553 09/20/24 1553 09/20/24 1553 09/20/24 1425   97.6 °F (36.4 °C) 59 16 143/73 100 % 8     Weight (last 2 days)       None            Vital Signs (last 3 days)       Date/Time Temp Pulse Resp BP MAP (mmHg) SpO2 Calculated FIO2 (%) - Nasal Cannula O2 Flow Rate (L/min) Nasal Cannula O2 Flow Rate (L/min) O2 Device Cardiac (WDL) Patient Position - Orthostatic  VS Batsheva Coma Scale Score Pain    09/23/24 1144 -- -- -- -- -- -- -- -- -- -- -- -- -- 7    09/23/24 0951 -- -- -- -- -- -- -- -- -- -- -- -- -- 9 09/23/24 0854 -- -- -- -- -- -- -- -- -- -- -- -- -- 9 09/23/24 0816 -- -- -- -- -- 99 % -- -- -- -- -- -- -- --    09/23/24 08:11:13 100 °F (37.8 °C) 84 18 129/70 90 95 % -- -- -- -- -- -- -- --    09/23/24 0657 -- -- -- -- -- -- -- -- -- -- -- -- -- 10 - Worst Possible Pain    09/23/24 0513 -- -- -- -- -- -- -- -- -- -- -- -- -- 9 09/23/24 0238 -- -- -- -- -- -- -- -- -- -- -- -- -- 9 09/23/24 0026 -- -- -- -- -- -- -- -- -- -- -- -- -- 9 09/22/24 2218 -- -- -- -- -- -- -- -- -- -- -- -- -- 9 09/22/24 2202 -- -- -- -- -- -- -- -- -- -- -- -- -- Med Not Given for Pain - for MAR use only    09/22/24 22:00:20 100.3 °F (37.9 °C) 80 18 129/70 90 93 % -- -- -- None (Room air) -- Lying -- --    09/22/24 2200 -- -- -- -- -- -- -- -- -- -- -- -- 15 --    09/22/24 2031 -- -- -- -- -- -- -- -- -- -- -- -- -- 9 09/22/24 1935 -- -- -- -- -- 95 % -- -- -- None (Room air) -- -- -- --    09/22/24 1713 -- -- -- -- -- -- -- -- -- -- -- -- -- 9 09/22/24 1517 -- -- -- -- -- -- -- -- -- -- -- -- -- 8 09/22/24 15:09:43 98.8 °F (37.1 °C) 79 18 127/71 90 93 % -- -- -- -- -- Sitting -- --    09/22/24 15:09:27 98.8 °F (37.1 °C) 78 -- 127/71 90 93 % -- -- -- -- -- -- -- --    09/22/24 1336 -- -- -- -- -- -- -- -- -- -- -- -- -- 9 09/22/24 1133 -- -- -- -- -- -- -- -- -- -- -- -- -- Med Not Given for Pain - for MAR use only    09/22/24 1110 -- -- -- -- -- 93 % 28 -- 2 L/min Nasal cannula -- -- -- --    09/22/24 1100 101.2 °F (38.4 °C) -- -- -- -- -- -- -- -- -- -- -- -- --    09/22/24 1030 -- -- -- -- -- -- -- -- -- -- -- -- -- 8 09/22/24 0845 -- -- -- -- -- -- -- -- -- None (Room air) -- -- 15 10 - Worst Possible Pain    09/22/24 0815 -- -- -- -- -- -- -- -- -- -- -- -- -- Med Not Given for Pain - for MAR use only    09/22/24 08:09:51 102.9 °F (39.4 °C) 92 --  141/74 96 93 % -- -- -- -- -- -- -- --    09/22/24 0631 -- -- -- -- -- -- -- -- -- -- -- -- -- 8 09/22/24 0443 -- -- -- -- -- -- -- -- -- -- -- -- -- 7 09/22/24 0004 -- -- -- -- -- -- -- -- -- -- -- -- -- 8 09/21/24 23:56:27 100.7 °F (38.2 °C) 81 14 -- -- 98 % -- -- -- -- -- -- -- --    09/21/24 22:55:54 102.4 °F (39.1 °C) 83 15 137/70 92 91 % -- -- -- None (Room air) -- Lying -- --    09/21/24 2223 -- -- -- -- -- -- -- -- -- -- -- -- -- 8 09/21/24 2218 -- -- -- -- -- -- -- -- -- -- -- -- -- 8 09/21/24 2200 -- -- -- -- -- -- -- -- -- -- -- -- 15 --    09/21/24 2000 -- -- -- -- -- -- -- -- -- -- -- -- -- 8 09/21/24 1810 -- -- -- -- -- -- -- -- -- -- -- -- -- 8 09/21/24 1603 -- -- -- -- -- -- -- -- -- -- -- -- -- 8 09/21/24 15:07:10 99.2 °F (37.3 °C) -- 15 127/71 90 -- -- -- -- -- -- -- -- --    09/21/24 1454 -- -- -- -- -- -- -- -- -- -- -- -- -- 9 09/21/24 1358 -- -- -- -- -- -- -- -- -- -- -- -- -- 10 - Worst Possible Pain    09/21/24 1351 -- -- -- -- -- -- -- -- -- -- -- -- -- 7 09/21/24 1056 -- -- -- -- -- -- -- -- -- -- -- -- -- 8 09/21/24 0926 -- -- -- -- -- -- -- -- -- None (Room air) -- -- 15 7 09/21/24 0816 -- -- -- -- -- -- -- -- -- -- -- -- -- 5 09/21/24 08:07:31 98.5 °F (36.9 °C) 82 25 154/94 114 99 % -- -- -- -- -- -- -- --    09/21/24 0636 -- -- -- -- -- -- -- -- -- -- -- -- -- 8 09/21/24 0434 -- -- -- -- -- -- -- -- -- -- -- -- -- 9 09/20/24 2249 -- -- -- -- -- -- -- -- -- -- -- -- -- 8 09/20/24 22:16:20 100 °F (37.8 °C) 77 14 130/77 95 95 % -- -- -- None (Room air) -- Lying -- --    09/20/24 2200 -- -- -- -- -- -- -- -- -- -- -- -- 15 --    09/20/24 1951 -- -- -- -- -- -- -- -- -- -- -- -- -- 7 09/20/24 1854 -- -- -- -- -- -- 28 2 L/min 2 L/min Nasal cannula -- -- -- --    09/20/24 1849 -- -- -- -- -- -- -- -- -- -- -- -- -- 7 09/20/24 1733 -- -- -- -- -- -- -- -- -- -- -- -- -- 10 - Worst Possible Pain    09/20/24 17:19:56 98.4 °F (36.9 °C) 74 18  165/98 120 100 % -- -- -- Nasal cannula -- Lying -- --    09/20/24 1630 98 °F (36.7 °C) 67 19 162/91 117 100 % 28 -- 2 L/min Nasal cannula WDL -- 15 --    09/20/24 1620 -- 68 18 -- -- 100 % 28 -- 2 L/min Nasal cannula -- -- -- 7    09/20/24 1615 -- 66 14 152/85 110 100 % -- -- -- None (Room air) -- -- -- 8    09/20/24 1600 -- 62 22 154/89 114 100 % -- 6 L/min -- Simple mask -- -- -- --    09/20/24 1553 97.6 °F (36.4 °C) 59 16 143/73 99 100 % -- 6 L/min -- Simple mask WDL -- -- --    09/20/24 1425 -- -- -- -- -- -- -- -- -- None (Room air) -- -- -- 8              Pertinent Labs/Diagnostic Test Results:   Radiology:  CT abdomen pelvis wo contrast   Final Interpretation by Gina Yanes MD (09/23 1054)      Interval lithotripsy of previously noted large calculus in the left renal pelvis, now with multiple small stone fragments measuring up to 4 mm noted in the interpolar and lower pole calyces. A left ureteral stent is seen in place, in good position. No    hydronephrosis.               Resident: Mykel Muller I, the attending radiologist, have reviewed the images and agree with the final report above.      Workstation performed: [id number]         FL retrograde pyelogram   Final Interpretation by Shadi Boyle MD (09/21 1103)      Fluoroscopic guidance provided for left retrograde pyelogram.      Please see separate procedure report for additional details.         Workstation performed: [id number]           Cardiology:  No orders to display     GI:  No orders to display       Results from last 7 days   Lab Units 09/22/24  0832   SARS-COV-2  Negative     Results from last 7 days   Lab Units 09/23/24  0514 09/22/24  0449 09/21/24  0435 09/20/24  0748   WBC Thousand/uL 6.66 6.57 8.57 9.14   HEMOGLOBIN g/dL 10.2* 10.5* 11.9 12.5   HEMATOCRIT % 32.9* 34.7* 39.4 40.6   PLATELETS Thousands/uL 130* 136* 147* 167   TOTAL NEUT ABS Thousands/µL  --   --  6.43 6.16         Results from last 7 days   Lab Units  09/23/24  0514 09/22/24  0449 09/21/24  0435 09/20/24  0748   SODIUM mmol/L 135 134* 135 136   POTASSIUM mmol/L 3.7 4.1 4.9 4.3   CHLORIDE mmol/L 96 97 100 97   CO2 mmol/L 33* 31 29 33*   ANION GAP mmol/L 6 6 6 6   BUN mg/dL 36* 29* 26* 31*   CREATININE mg/dL 1.37* 1.36* 1.08 1.21   EGFR ml/min/1.73sq m 46 46 61 53   CALCIUM mg/dL 8.5 8.4 8.6 8.9   MAGNESIUM mg/dL  --   --  1.8*  --      Results from last 7 days   Lab Units 09/20/24  0748   AST U/L 11*   ALT U/L 18   ALK PHOS U/L 74   TOTAL PROTEIN g/dL 7.0   ALBUMIN g/dL 3.8   TOTAL BILIRUBIN mg/dL 0.62     Results from last 7 days   Lab Units 09/23/24  1129 09/23/24  0810 09/22/24  2104 09/22/24  1612 09/22/24  1106 09/22/24  0808 09/21/24  2039 09/21/24  1707 09/21/24  1204 09/21/24  0803 09/20/24  2052 09/20/24  1845   POC GLUCOSE mg/dl 186* 181* 196* 358* 136 137 187* 317* 264* 123 146* 67     Results from last 7 days   Lab Units 09/23/24  0514 09/22/24  0449 09/21/24  0435 09/20/24  0748   GLUCOSE RANDOM mg/dL 192* 152* 153* 144*         Results from last 7 days   Lab Units 09/21/24  0435   HEMOGLOBIN A1C % 7.5*   EAG mg/dl 169     Beta- Hydroxybutyrate   Date Value Ref Range Status   05/21/2024 0.06 0.02 - 0.27 mmol/L Final      Results from last 7 days   Lab Units 09/22/24  0927 09/20/24  0748   LACTIC ACID mmol/L 1.2 0.9     Results from last 7 days   Lab Units 09/22/24  1338 09/20/24  0748   CLARITY UA  Turbid Clear   COLOR UA  Dark Yellow Light Yellow   SPEC GRAV UA  1.006 1.009   PH UA  5.5 6.5   GLUCOSE UA mg/dl Negative 200 (1/5%)*   KETONES UA mg/dl Negative Negative   BLOOD UA  Large* Large*   PROTEIN UA mg/dl 70 (1+)* Negative   NITRITE UA  Negative Negative   BILIRUBIN UA  Negative Negative   UROBILINOGEN UA (BE) mg/dl <2.0 <2.0   LEUKOCYTES UA  Moderate* Negative   WBC UA /hpf 20-30* 2-4*   RBC UA /hpf Innumerable* Innumerable*   BACTERIA UA /hpf Innumerable* None Seen   EPITHELIAL CELLS WET PREP /hpf Occasional None Seen   MUCUS THREADS   Occasional*  --      Past Medical History:   Diagnosis Date    Abnormal uterine bleeding (AUB)     ABPA (allergic bronchopulmonary aspergillosis) ([facility]) 07/08/2012    Acute kidney injury (HCC) 09/08/2017    Acute on chronic respiratory failure ([facility]) 03/04/2017    Last Assessment & Plan:  Formatting of this note might be different from the original. Recurrent with clinical worsening with enlarging PTX, s/p multiple chest tubes, but new fevers 6/19 and worsening leukocytosis indicating infection as cause of 6/19 decompensation with profound hypoxia in setting of shunting through right lung and continued pneumothorax s/p 2nd chest tube placement.  6/27: Right    Anaphylaxis 06/06/2022    Anemia     Asthma     Chest pain 09/21/2021    Chronic pain     Chronic respiratory failure with hypoxia ([facility]) 09/07/2021    COVID-19 06/03/2022    Cystic fibrosis ([facility])     Deep vein thrombosis ([facility])     Diabetes mellitus ([facility])     GERD (gastroesophageal reflux disease)     HBP (high blood pressure)     HCAP (healthcare-associated pneumonia) 03/06/2020    History of transfusion     Influenza B 02/23/2020    Kidney stone     Neutropenic fever  ([facility]) 09/26/2020    Pneumonia due to Pseudomonas species ([facility]) 10/28/2010    Last Assessment & Plan:  Formatting of this note might be different from the original. Polymicrobial pna w/ pseudomonas, M. Abscessus, Cadida glabrata/albicans    PONV (postoperative nausea and vomiting)     Pseudomonas aeruginosa infection 09/20/2012    Renal calculus     Squamous cell skin cancer 01/12/2023    SCCIS- Left upper thigh    Transplant recipient     Lung- 2013, 2017    Ulcerative colitis ([facility])     Wellness examination 02/04/2013    Formatting of this note might be different from the original. Discussed with patient and acknowledgement form signed on 2/4/2013 (ALYSHA)     Present on Admission:   Renal calculus, left   Type 1 diabetes mellitus with diabetic chronic kidney disease, unspecified CKD stage ([facility])    Asthma   Stage 3a chronic kidney disease (HCC)   Continuous opioid dependence (HCC)   Pancreatic insufficiency      Admitting Diagnosis: Nephrolithiasis [N20.0]  Age/Sex: 48 y.o. female  Admission Orders:  Scheduled Medications:  apixaban, 5 mg, Oral, BID  ascorbic acid, 125 mg, Oral, Daily  atovaquone, 1,500 mg, Oral, Daily  azithromycin, 250 mg, Oral, Once per day on Monday Wednesday Friday  budesonide-formoterol, 2 puff, Inhalation, BID  cefTRIAXone, 1,000 mg, Intravenous, Q24H  Cholecalciferol, 5,000 Units, Oral, Daily  vitamin B-12, 1,000 mcg, Oral, Daily  famotidine, 40 mg, Oral, HS  insulin glargine, 12 Units, Subcutaneous, QAM  insulin lispro, 1-5 Units, Subcutaneous, TID AC  insulin lispro, 1-5 Units, Subcutaneous, HS  insulin lispro, 2 Units, Subcutaneous, TID With Meals  levalbuterol, 1.25 mg, Nebulization, BID  magnesium Oxide, 400 mg, Oral, Daily  oxybutynin, 5 mg, Oral, TID  pancrelipase (Lip-Prot-Amyl), 48,000 Units, Oral, TID With Meals  pantoprazole, 40 mg, Oral, Early Morning  phenazopyridine, 100 mg, Oral, TID With Meals  predniSONE, 15 mg, Oral, Daily  pregabalin, 150 mg, Oral, BID  sodium chloride, 4 mL, Nebulization, BID  tacrolimus, 2 mg, Oral, Q12H DOTTY  tamsulosin, 0.4 mg, Oral, Daily With Dinner  valACYclovir, 500 mg, Oral, Daily      Continuous IV Infusions: none     PRN Meds:  acetaminophen, 650 mg, Oral, Q6H PRN  albuterol, 2 puff, Inhalation, Q4H PRN  ALPRAZolam, 1 mg, Oral, HS PRN  dextromethorphan-guaiFENesin, 5 mL, Oral, Q6H PRN  HYDROmorphone, 0.5 mg, Intravenous, Q2H PRN x3  ondansetron, 4 mg, Oral, Q6H PRN x1  oxyCODONE, 5 mg, Oral, Q4H PRN   Or  oxyCODONE, 10 mg, Oral, Q4H PRN x3        IP CONSULT TO ACUTE PAIN SERVICE    Network Utilization Review Department  ATTENTION: Please call with any questions or concerns to [phone] and carefully listen to the prompts so that you are directed to the right person. All voicemails are confidential.   For Discharge needs, contact Care  Management DC Support Team at [phone] opt. [phone]  Send all requests for admission clinical reviews, approved or denied determinations and any other requests to dedicated fax number below belonging to the campus where the patient is receiving treatment. List of dedicated fax numbers for the Facilities:  FACILITY NAME UR FAX NUMBER   ADMISSION DENIALS (Administrative/Medical Necessity) [phone]   DISCHARGE SUPPORT TEAM (NETWORK) [phone]   PARENT CHILD HEALTH (Maternity/NICU/Pediatrics) [phone]   [facility] [phone]   [facility] [phone]   [facility] [phone]   [facility] [phone]   [facility] [phone]   [facility] [phone]   [facility] [phone]   [facility] [phone]   [facility] [phone]   [facility] [phone]   [facility] [phone]   [facility] [phone]

## 2024-09-23 NOTE — ASSESSMENT & PLAN NOTE
Patient with history of kidney stone, history of lung transplant, currently on immunosuppressant, presented to [facility] with left flank pain since 1 day, CT renal stone study - Left renal pelvic calculus measuring 1.5 x 0.8 x 1.2 cm. Mild fullness of the lower pole calyx.   Transferred to [facility] for urology intervention  S/p cystoscopy, ureteroscopy with lithotripsy holmium laser, retrograde pyelogram and insertion of left ureteral stent and basket stone extraction 9/20/24  Urology plans repeat CT in 2 weeks.  If minimal stone burden then stent will be removed but if large stone burden persistent then plan is for second stage ureteroscopy  Flomax, pyridium. Strain all urine. Bladder scan.  9/23 Repeated CT abd/pelvis due to fever, ongoing abdominal pain: Interval lithotripsy of previously noted large calculus in the left renal pelvis, now with multiple small stone fragments measuring up to 4 mm noted in the interpolar and lower pole calyces. A left ureteral stent is seen in place, in good position. No hydronephrosis.  Continue currently management with IV abx, Flomax, pyridium, strain all urine

## 2024-09-23 NOTE — ASSESSMENT & PLAN NOTE
Dilaudid IV added and frequency of Oxycodone increased due to acute on chronic pain from left renal calculus  Will call [facility]PS due to requiring escalating dose of IV dialudid

## 2024-09-23 NOTE — PLAN OF CARE
Problem: PAIN - ADULT  Goal: Verbalizes/displays adequate comfort level or baseline comfort level  Description: Interventions:  - Encourage patient to monitor pain and request assistance  - Assess pain using appropriate pain scale  - Administer analgesics based on type and severity of pain and evaluate response  - Implement non-pharmacological measures as appropriate and evaluate response  - Consider cultural and social influences on pain and pain management  - Notify physician/advanced practitioner if interventions unsuccessful or patient reports new pain  Outcome: Progressing     Problem: INFECTION - ADULT  Goal: Absence or prevention of progression during hospitalization  Description: INTERVENTIONS:  - Assess and monitor for signs and symptoms of infection  - Monitor lab/diagnostic results  - Monitor all insertion sites, i.e. indwelling lines, tubes, and drains  - Monitor endotracheal if appropriate and nasal secretions for changes in amount and color  - [place] appropriate cooling/warming therapies per order  - Administer medications as ordered  - Instruct and encourage patient and family to use good hand hygiene technique  - Identify and instruct in appropriate isolation precautions for identified infection/condition  Outcome: Progressing  Goal: Absence of fever/infection during neutropenic period  Description: INTERVENTIONS:  - Monitor WBC    Outcome: Progressing     Problem: SAFETY ADULT  Goal: Patient will remain free of falls  Description: INTERVENTIONS:  - Educate patient/family on patient safety including physical limitations  - Instruct patient to call for assistance with activity   - Consult OT/PT to assist with strengthening/mobility   - Keep Call bell within reach  - Keep bed low and locked with side rails adjusted as appropriate  - Keep care items and personal belongings within reach  - Initiate and maintain comfort rounds  - Make Fall Risk Sign visible to staff  - Offer Toileting every  Hours,  in advance of need  - Initiate/Maintain alarm  - Obtain necessary fall risk management equipment:  - Apply yellow socks and bracelet for high fall risk patients  - Consider moving patient to room near nurses station  Outcome: Progressing  Goal: Maintain or return to baseline ADL function  Description: INTERVENTIONS:  -  Assess patient's ability to carry out ADLs; assess patient's baseline for ADL function and identify physical deficits which impact ability to perform ADLs (bathing, care of mouth/teeth, toileting, grooming, dressing, etc.)  - Assess/evaluate cause of self-care deficits   - Assess range of motion  - Assess patient's mobility; develop plan if impaired  - Assess patient's need for assistive devices and provide as appropriate  - Encourage maximum independence but intervene and supervise when necessary  - Involve family in performance of ADLs  - Assess for home care needs following discharge   - Consider OT consult to assist with ADL evaluation and planning for discharge  - Provide patient education as appropriate  Outcome: Progressing  Goal: Maintains/Returns to pre admission functional level  Description: INTERVENTIONS:  - Perform AM-PAC 6 Click Basic Mobility/ Daily Activity assessment daily.  - Set and communicate daily mobility goal to care team and patient/family/caregiver.   - Collaborate with rehabilitation services on mobility goals if consulted  - Perform Range of Motion  times a day.  - Reposition patient every  hours.  - Dangle patient  times a day  - Stand patient  times a day  - Ambulate patient  times a day  - Out of bed to chair  times a day   - Out of bed for meals  times a day  - Out of bed for toileting  - Record patient progress and toleration of activity level   Outcome: Progressing     Problem: DISCHARGE PLANNING  Goal: Discharge to home or other facility with appropriate resources  Description: INTERVENTIONS:  - Identify barriers to discharge w/patient and caregiver  - Arrange for  needed discharge resources and transportation as appropriate  - Identify discharge learning needs (meds, wound care, etc.)  - Arrange for interpretive services to assist at discharge as needed  - Refer to Case Management Department for coordinating discharge planning if the patient needs post-hospital services based on physician/advanced practitioner order or complex needs related to functional status, cognitive ability, or social support system  Outcome: Progressing     Problem: Knowledge Deficit  Goal: Patient/family/caregiver demonstrates understanding of disease process, treatment plan, medications, and discharge instructions  Description: Complete learning assessment and assess knowledge base.  Interventions:  - Provide teaching at level of understanding  - Provide teaching via preferred learning methods  Outcome: Progressing

## 2024-09-23 NOTE — ASSESSMENT & PLAN NOTE
Lab Results   Component Value Date    EGFR 46 09/23/2024    EGFR 46 09/22/2024    EGFR 61 09/21/2024    CREATININE 1.37 (H) 09/23/2024    CREATININE 1.36 (H) 09/22/2024    CREATININE 1.08 09/21/2024   Baseline creatinine 1 to 1.2  Due to mild elevated creatinine, will hold off home Lasix

## 2024-09-23 NOTE — ASSESSMENT & PLAN NOTE
H/o cystic fibrosis s/p bilateral lung transplant in 2013 and left lung transplant on 3/1/13519  S/p right pneumonectomy  home regimen - Prednisone 15 mg daily(being tapered down to 10 mg), Tacrolimus 3.5 mg in the morning and 3 mg in the evening,   Continue atovaquone 1500 mg daily, Azithromycin 250 mg 3 times weekly on M/W/F, Valacyclovir 500 mg daily  Transplant center at [place] updated with current status at # [phone] - goal Tacrolimus trough level 6-8  Patient's original transplant specialist is Dr. Chavez Kurtz.  Tacrolimus level 17+, discussed with [facility] transplant center on call Dr. Douglas Lema, held tacrolimus   resume from 9/22/24 night Tacrolium 2 mg BID. Repeat level Monday morning.  Will need to recontact [facility] transplant service if tacrolimus level is out of range

## 2024-09-23 NOTE — PROGRESS NOTES
Progress Note - Hospitalist   Name: Maximilian Mann 48 y.o. female I MRN: [id number]  Unit/Bed#: [facility] 934-01 I Date of Admission: 9/20/2024   Date of Service: 9/23/2024 I Hospital Day: 3    Assessment & Plan  Renal calculus, left  Patient with history of kidney stone, history of lung transplant, currently on immunosuppressant, presented to [facility] with left flank pain since 1 day, CT renal stone study - Left renal pelvic calculus measuring 1.5 x 0.8 x 1.2 cm. Mild fullness of the lower pole calyx.   Transferred to [facility] for urology intervention  S/p cystoscopy, ureteroscopy with lithotripsy holmium laser, retrograde pyelogram and insertion of left ureteral stent and basket stone extraction 9/20/24  Urology plans repeat CT in 2 weeks.  If minimal stone burden then stent will be removed but if large stone burden persistent then plan is for second stage ureteroscopy  Flomax, pyridium. Strain all urine. Bladder scan.  9/23 Repeated CT abd/pelvis due to fever, ongoing abdominal pain: Interval lithotripsy of previously noted large calculus in the left renal pelvis, now with multiple small stone fragments measuring up to 4 mm noted in the interpolar and lower pole calyces. A left ureteral stent is seen in place, in good position. No hydronephrosis.  Continue currently management with IV abx, Flomax, pyridium, strain all urine  Hx of lung transplant (HCC)  H/o cystic fibrosis s/p bilateral lung transplant in 2013 and left lung transplant on 3/1/19487  S/p right pneumonectomy  home regimen - Prednisone 15 mg daily(being tapered down to 10 mg), Tacrolimus 3.5 mg in the morning and 3 mg in the evening,   Continue atovaquone 1500 mg daily, Azithromycin 250 mg 3 times weekly on M/W/F, Valacyclovir 500 mg daily  Transplant center at [facility] updated with current status at # [phone] - goal Tacrolimus trough level 6-8  Patient's original transplant specialist is Dr. Chavez Kurtz.  Tacrolimus level 17+, discussed with  [facility] transplant center on call Dr. Douglas Lema, held tacrolimus   resume from 9/22/24 night Tacrolium 2 mg BID. Repeat level Monday morning.  Will need to recontact [facility] transplant service if tacrolimus level is out of range  Type 1 diabetes mellitus with diabetic chronic kidney disease, unspecified CKD stage (HCC)  Lab Results   Component Value Date    HGBA1C 7.5 (H) 09/21/2024       Recent Labs     09/22/24  1612 09/22/24  2104 09/23/24  0810 09/23/24  1129   POCGLU 358* 196* 181* 186*       Blood Sugar Average: Last 72 hrs:  (P) [id number] regimen: Lantus 12 units in am, Aspart 6 units TID  Ct Lantus 12 units in am, add Humalog at 2 units TID and sliding scale insulin  Monitor blood sugars and adjust insulin accordingly    History of pulmonary embolus (PE)  Continue Eliquis 5 mg BID as patient requested due to h/o multiple PE/DVT.  Will continue, hgb stable  Asthma  Ct Symbicort, prn Albuterol  Stage 3a chronic kidney disease (HCC)  Lab Results   Component Value Date    EGFR 46 09/23/2024    EGFR 46 09/22/2024    EGFR 61 09/21/2024    CREATININE 1.37 (H) 09/23/2024    CREATININE 1.36 (H) 09/22/2024    CREATININE 1.08 09/21/2024   Baseline creatinine 1 to 1.2  Due to mild elevated creatinine, will hold off home Lasix  Continuous opioid dependence (HCC)  Dilaudid IV added and frequency of Oxycodone increased due to acute on chronic pain from left renal calculus  Will call APS due to requiring escalating dose of IV dialudid  Pancreatic insufficiency  Ct Pancrealipase  Sore throat  Sore throat since 1 day  Patient was concerned for strep throat  Negative covid/flu/rsv  Strep A PCR - not detected  Symptomatic treatment    Fever  Patient had fever of 102 on 9/22/2024  Still has abdominal pain. No leukocytosis. Normal lactic acid.   Fever could be secondary to viral infection as attributed by sore throat, vs. Urinary source due to above.   Patient is immunosuppressed, therefore on empiric IV  rocephin.  Urinalysis positive, pending final urine culture  Blood culture x 2 negative so far  Repeat CT abdomen pelvis shows multiple small stone fragments due to recent lithotripsy, however no hydronephrosis noted.    VTE Pharmacologic Prophylaxis: VTE Score: 6 High Risk (Score >/= 5) - Pharmacological DVT Prophylaxis Ordered: apixaban (Eliquis). Sequential Compression Devices Ordered.    Mobility:   Basic Mobility Inpatient Raw Score: 24  JH-HLM Goal: 8: Walk 250 feet or more  JH-HLM Achieved: 8: Walk 250 feet ot more  JH-HLM Goal achieved. Continue to encourage appropriate mobility.    Patient Centered Rounds: I performed bedside rounds with nursing staff today.        Current Length of Stay: 3 day(s)  Current Patient Status: Inpatient   Certification Statement: The patient will continue to require additional inpatient hospital stay due to pending IV abx, pain control  Discharge Plan: Anticipate discharge in 24-48 hrs to home.    Code Status: Level 1 - Full Code    Subjective     Patient examined at bedside.  Still complains of significant abdominal pain especially on the left flank pain secondary to kidney stone.  Tmax of 100.3 last night.  Patient is tolerating diet.  Repeated CT abdomen pelvis this morning, shows fragmented kidney stone secondary to lithotripsy, no hydronephrosis.  No other acute abnormalities.    Objective     Vitals:   Temp (24hrs), Av.5 °F (37.5 °C), Min:98.8 °F (37.1 °C), Max:100.3 °F (37.9 °C)    Temp:  [98.8 °F (37.1 °C)-100.3 °F (37.9 °C)] 100 °F (37.8 °C)  HR:  [78-84] 84  Resp:  [18] 18  BP: (127-129)/(70-71) 129/70  SpO2:  [93 %-99 %] 99 %  Body mass index is 22.82 kg/m².     Input and Output Summary (last 24 hours):     Intake/Output Summary (Last 24 hours) at 2024 1402  Last data filed at 2024 1148  Gross per 24 hour   Intake 1380 ml   Output 3200 ml   Net -1820 ml       Physical Exam  Constitutional:       General: She is in acute distress.      Appearance: She is  well-developed.   HENT:      Head: Normocephalic and atraumatic.      Right Ear: External ear normal.      Left Ear: External ear normal.      Nose: Nose normal.   Cardiovascular:      Heart sounds: Normal heart sounds.   Pulmonary:      Effort: Pulmonary effort is normal.      Breath sounds: Normal breath sounds.   Skin:     General: Skin is warm and dry.   Neurological:      Mental Status: She is alert.          Lines/Drains:  Lines/Drains/Airways       Active Status       Name Placement date Placement time Site Days    Port A Cath 11/25/22 Right Chest 11/25/22  0953  Chest  668    Port A Cath 11/25/22 Right Chest 11/25/22  1201  Chest  668                                  Lab Results: I have reviewed the following results: none   Results from last 7 days   Lab Units 09/23/24  0514 09/22/24  0449 09/21/24  0435   WBC Thousand/uL 6.66   < > 8.57   HEMOGLOBIN g/dL 10.2*   < > 11.9   HEMATOCRIT % 32.9*   < > 39.4   PLATELETS Thousands/uL 130*   < > 147*   SEGS PCT %  --   --  76*   LYMPHO PCT %  --   --  15   MONO PCT %  --   --  9   EOS PCT %  --   --  0    < > = values in this interval not displayed.     Results from last 7 days   Lab Units 09/23/24  0514 09/21/24  0435 09/20/24  0748   SODIUM mmol/L 135   < > 136   POTASSIUM mmol/L 3.7   < > 4.3   CHLORIDE mmol/L 96   < > 97   CO2 mmol/L 33*   < > 33*   BUN mg/dL 36*   < > 31*   CREATININE mg/dL 1.37*   < > 1.21   ANION GAP mmol/L 6   < > 6   CALCIUM mg/dL 8.5   < > 8.9   ALBUMIN g/dL  --   --  3.8   TOTAL BILIRUBIN mg/dL  --   --  0.62   ALK PHOS U/L  --   --  74   ALT U/L  --   --  18   AST U/L  --   --  11*   GLUCOSE RANDOM mg/dL 192*   < > 144*    < > = values in this interval not displayed.         Results from last 7 days   Lab Units 09/23/24  1129 09/23/24  0810 09/22/24  2104 09/22/24  1612 09/22/24  1106 09/22/24  0808 09/21/24  2039 09/21/24  1707 09/21/24  1204 09/21/24  0803 09/20/24 2052 09/20/24  1845   POC GLUCOSE mg/dl 186* 181* 196* 358* 136 137  187* 317* 264* 123 146* 67     Results from last 7 days   Lab Units 09/21/24  0435   HEMOGLOBIN A1C % 7.5*     Results from last 7 days   Lab Units 09/22/24  0927 09/20/24  0748   LACTIC ACID mmol/L 1.2 0.9       Recent Cultures (last 7 days):   Results from last 7 days   Lab Units 09/22/24  1338 09/22/24  0927 09/22/24  0926 09/20/24  1452   BLOOD CULTURE   --  No Growth at 24 hrs. No Growth at 24 hrs.  --    URINE CULTURE  Culture too young- will reincubate  --   --  60,000-69,000 cfu/ml Candida albicans*  50,000-59,000 cfu/ml Candida glabrata*          Last 24 Hours Medication List:     Current Facility-Administered Medications:     acetaminophen (TYLENOL) tablet 975 mg, Q6H PRN    albuterol (PROVENTIL HFA,VENTOLIN HFA) inhaler 2 puff, Q4H PRN    ALPRAZolam (XANAX) tablet 1 mg, HS PRN    apixaban (ELIQUIS) tablet 5 mg, BID    ascorbic acid (VITAMIN C) tablet 125 mg, Daily    atovaquone (MEPRON) oral suspension 1,500 mg, Daily    azithromycin (ZITHROMAX) tablet 250 mg, Once per day on Monday Wednesday Friday    budesonide-formoterol (SYMBICORT) 160-4.5 mcg/act inhaler 2 puff, BID    cefTRIAXone (ROCEPHIN) 1,000 mg in dextrose 5 % 50 mL IVPB, Q24H, Last Rate: 1,000 mg (09/23/24 0837)    Cholecalciferol (VITAMIN D3) tablet 5,000 Units, Daily    cyanocobalamin (VITAMIN B-12) tablet 1,000 mcg, Daily    dextromethorphan-guaiFENesin (ROBITUSSIN DM) oral syrup 5 mL, Q6H PRN    famotidine (PEPCID) tablet 40 mg, HS    HYDROmorphone (DILAUDID) injection 1 mg, Q4H PRN    insulin glargine (LANTUS) subcutaneous injection 12 Units 0.12 mL, QAM    insulin lispro (HumALOG/ADMELOG) 100 units/mL subcutaneous injection 1-5 Units, TID AC **AND** Fingerstick Glucose (POCT), TID AC    insulin lispro (HumALOG/ADMELOG) 100 units/mL subcutaneous injection 1-5 Units, HS    insulin lispro (HumALOG/ADMELOG) 100 units/mL subcutaneous injection 2 Units, TID With Meals    levalbuterol (XOPENEX) inhalation solution 1.25 mg, BID    magnesium  Oxide (MAG-OX) tablet 400 mg, Daily    ondansetron (ZOFRAN-ODT) dispersible tablet 4 mg, Q6H PRN    oxybutynin (DITROPAN) tablet 5 mg, TID    oxyCODONE (ROXICODONE) IR tablet 5 mg, Q4H PRN **OR** oxyCODONE (ROXICODONE) immediate release tablet 10 mg, Q4H PRN    pancrelipase (Lip-Prot-Amyl) (CREON) delayed release capsule 48,000 Units, TID With Meals    pantoprazole (PROTONIX) EC tablet 40 mg, Early Morning    phenazopyridine (PYRIDIUM) tablet 100 mg, TID With Meals    predniSONE tablet 15 mg, Daily    pregabalin (LYRICA) capsule 150 mg, BID    sodium chloride 3 % inhalation solution 4 mL, BID    tacrolimus (PROGRAF) capsule 2 mg, Q12H DOTTY    tamsulosin (FLOMAX) capsule 0.4 mg, Daily With Dinner    valACYclovir (VALTREX) tablet 500 mg, Daily    Administrative Statements   Today, Patient Was Seen By: Trey Akers MD  No    **Please Note: This note may have been constructed using a voice recognition system.**

## 2024-09-23 NOTE — ASSESSMENT & PLAN NOTE
Patient had fever of 102 on 9/22/2024  Still has abdominal pain. No leukocytosis. Normal lactic acid.   Fever could be secondary to viral infection as attributed by sore throat, vs. Urinary source due to above.   Patient is immunosuppressed, therefore on empiric IV rocephin.  Urinalysis positive, pending final urine culture  Blood culture x 2 negative so far  Repeat CT abdomen pelvis shows multiple small stone fragments due to recent lithotripsy, however no hydronephrosis noted.

## 2024-09-23 NOTE — ASSESSMENT & PLAN NOTE
Lab Results   Component Value Date    HGBA1C 7.5 (H) 09/21/2024       Recent Labs     09/22/24  1612 09/22/24  2104 09/23/24  0810 09/23/24  1129   POCGLU 358* 196* 181* 186*       Blood Sugar Average: Last 72 hrs:  (P) [id number]

## 2024-09-23 NOTE — CONSULTS
"Consultation - Acute Pain   Name: Maximilian Mann 48 y.o. female I MRN: [id number]  Unit/Bed#: [facility] 934-01 I Date of Admission: 9/20/2024   Date of Service: 9/23/2024 I Hospital Day: 3   Inpatient consult to Acute Pain Service  Consult performed by: Ramirez Schofield PA-C  Consult ordered by: Trey Akers MD        Physician Requesting Evaluation: Trey Akers MD   Reason for Evaluation / Principal Problem: Left renal colic    Assessment & Plan  Renal calculus, left  Lyrica 150 mg p.o. twice daily.  Tylenol 975 mg p.o. every 6 hours as needed mild pain.  Oxycodone 5 mg p.o. every 4 hours as needed moderate pain or 10 mg p.o. every 4 hours as needed severe pain.  Dilaudid 1 mg IV every 4 hours as needed breakthrough pain.  Will plan to decrease the dose of this tomorrow.  Add ketamine infusion at 0.1 mg/kg/h IV continuous.  Add as needed glycopyrrolate, Haldol, Ativan to mitigate any potential side effects from ketamine infusion.  Continue heating pad as needed.  Add as needed Narcan in the event that opioid reversal is necessary.  Bowel regimen to avoid opioid-induced constipation while on opioid pain medication.  Stage 3a chronic kidney disease (HCC)  Lab Results   Component Value Date    EGFR 46 09/23/2024    EGFR 46 09/22/2024    EGFR 61 09/21/2024    CREATININE 1.37 (H) 09/23/2024    CREATININE 1.36 (H) 09/22/2024    CREATININE 1.08 09/21/2024   Estimated Creatinine Clearance: 40.6 mL/min (A) (by C-G formula based on SCr of 1.37 mg/dL (H)).  Will attempt to minimize nephrotoxic pain medications.  Hx of lung transplant (HCC)  Will avoid NSAIDs.  Continuous opioid dependence (HCC)  Patient is prescribed oxycodone 10 mg, 180 tablets for 30 days.  Patient gets this prescription once a month.  Patient fills this prescription monthly.  Patient states she takes only 1 or 2 tablets of this per month.  States that she fills the prescription because the insurance company will \"cut her off\" if she stops filling the " prescription and, if she were to need it again, will that her insurance would not cover it.  Will contact patient's primary provider/prescriber to advise him of this.  Please contact the [employer] role for the Acute Pain service with any questions/concerns.    [facility] will continue to follow. Please contact Acute Pain Service - via [employer] from [phone] with additional questions or concerns. See SecureChat or Amion for additional contacts and after hours information.     History of Present Illness    HPI: Maximilian Mann is a 48 y.o. year old female who presents with left flank pain beginning 9/20/2024.  Patient was seen in the emergency department and transferred to [place] for further management.  She underwent cystoscopy, ureteroscopy, holmium laser lithotripsy and insertion of left ureteral stent with basket stone extraction on 9/20/2024.  Since that time, patient has continued to have sharp, stabbing left flank pain radiating into the left lower quadrant.  Patient has been on a standard pain regimen with only mild to moderate relief from this regimen.  States that the pain waxes and wanes but does not go away and has interfered with her sleep.  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  Also history of stage III CKD with GFR and creatinine clearance less than 60.    Currently, patient complains of 7-8 out of 10 left flank pain which worsens spontaneously and occasionally with movement.    Patient has a documented history of chronic pain although the site of this is not documented.  Patient is prescribed oxycodone 10 mg, 180 tablets for 30-day supply.  She receives this prescription and feels this prescription every month although tells me that she takes only 1 or 2 tablets/month.  She states that she feels the prescription monthly because her insurance company told her that if she stops filling this prescription and then were to needed  again that they would not cover it, so therefore she continues to fill this prescription and not use the medication.    Current pain location(s): Pain Score: 8  Pain Location/Orientation: Location: Abdomen  Pain Scale: Pain Assessment Tool: 0-10  Current Analgesic regimen:  Tylenol 975 mg p.o. every 6 hours as needed mild pain.  Oxycodone 5 mg p.o. every 4 hours as needed moderate pain or 10 mg p.o. every 4 hours as needed severe pain.  Dilaudid 1 mg IV every 4 hours as needed breakthrough pain.  Lyrica 150 mg p.o. twice daily.    Pain History: Unclear  Pain Management Physician: Primary care provider  I have reviewed the patient's controlled substance dispensing history in the Prescription Drug Monitoring Program in compliance with the [facility] regulations before prescribing any controlled substances.     Review of Systems   Genitourinary:  Positive for flank pain.   All other systems reviewed and are negative.    I have reviewed the patient's PMH, PSH, Social History, Family History, Meds, and Allergies  Historical Information   Past Medical History:   Diagnosis Date    Abnormal uterine bleeding (AUB)     ABPA (allergic bronchopulmonary aspergillosis) (HCC) 07/08/2012    Acute kidney injury (HCC) 09/08/2017    Acute on chronic respiratory failure ([facility]) 03/04/2017    Last Assessment & Plan:  Formatting of this note might be different from the original. Recurrent with clinical worsening with enlarging PTX, s/p multiple chest tubes, but new fevers 6/19 and worsening leukocytosis indicating infection as cause of 6/19 decompensation with profound hypoxia in setting of shunting through right lung and continued pneumothorax s/p 2nd chest tube placement.  6/27: Right    Anaphylaxis 06/06/2022    Anemia     Asthma     Chest pain 09/21/2021    Chronic pain     Chronic respiratory failure with hypoxia (HCC) 09/07/2021    COVID-19 06/03/2022    Cystic fibrosis (HCC)     Deep vein thrombosis (HCC)     Diabetes mellitus (HCC)      GERD (gastroesophageal reflux disease)     HBP (high blood pressure)     HCAP (healthcare-associated pneumonia) 03/06/2020    History of transfusion     Influenza B 02/23/2020    Kidney stone     Neutropenic fever  (HCC) 09/26/2020    Pneumonia due to Pseudomonas species (HCC) 10/28/2010    Last Assessment & Plan:  Formatting of this note might be different from the original. Polymicrobial pna w/ pseudomonas, M. Abscessus, Cadida glabrata/albicans    PONV (postoperative nausea and vomiting)     Pseudomonas aeruginosa infection 09/20/2012    Renal calculus     Squamous cell skin cancer 01/12/2023    SCCIS- Left upper thigh    Transplant recipient     Lung- 2013, 2017    Ulcerative colitis (HCC)     Wellness examination 02/04/2013    Formatting of this note might be different from the original. Discussed with patient and acknowledgement form signed on 2/4/2013 (ALYSHA)     Past Surgical History:   Procedure Laterality Date    APPENDECTOMY      AUGMENTATION MAMMAPLASTY Bilateral 2004    AUGMENTATION MAMMAPLASTY Right 2021    removed     BREAST SURGERY      Aug.     BRONCHOSCOPY      CHOLECYSTECTOMY      COLONOSCOPY      EGD  07/2019    [facility]     FL RETROGRADE PYELOGRAM  01/09/2020    FL RETROGRADE PYELOGRAM  02/04/2020    FL RETROGRADE PYELOGRAM  9/20/2024    GASTROSCOPY  03/01/2024    GASTROSTOMY TUBE, PLACE      IR BALLOON-OCCLUDED ANTEGRADE TRANSVENOUS OBLITERATION (BATO)  11/22/2022    IR PORT PLACEMENT  03/22/2022    IR PORT REMOVAL  01/10/2022    IR PORT STRIPPING  11/25/2022    IR TUNNELED CENTRAL LINE PLACEMENT  01/13/2022    LUNG SURGERY      LUNG TRANSPLANT      X2    ND CYSTO BLADDER W/URETERAL CATHETERIZATION Right 01/09/2020    Procedure: CYSTOSCOPY RETROGRADE PYELOGRAM WITH INSERTION STENT URETERAL;  Surgeon: Patricio Perry MD;  Location: [facility];  Service: Urology    ND CYSTO/URETERO W/LITHOTRIPSY &INDWELL STENT INSRT Right 02/04/2020    Procedure: CYSTOSCOPY URETEROSCOPY WITH LITHOTRIPSY  HOLMIUM LASER, RETROGRADE PYELOGRAM AND INSERTION STENT URETERAL;  Surgeon: Patricio Perry MD;  Location: [facility];  Service: Urology    UT CYSTO/URETERO W/LITHOTRIPSY &INDWELL STENT INSRT Left 2024    Procedure: CYSTOSCOPY URETEROSCOPY WITH LITHOTRIPSY HOLMIUM LASER, RETROGRADE PYELOGRAM AND INSERTION STENT URETERAL, BASKET STONE EXTRACTION;  Surgeon: Lee Briceno MD;  Location: [facility];  Service: Urology    SKIN BIOPSY      US GUIDED VASCULAR ACCESS  2017     Social History     Tobacco Use    Smoking status: Former     Current packs/day: 0.00     Average packs/day: 0.5 packs/day for 12.0 years (6.0 ttl pk-yrs)     Types: Cigarettes     Start date: 1993     Quit date: 2005     Years since quittin.7     Passive exposure: Past    Smokeless tobacco: Never   Vaping Use    Vaping status: Never Used   Substance and Sexual Activity    Alcohol use: Yes     Alcohol/week: 2.0 standard drinks of alcohol     Types: 2 Glasses of wine per week     Comment: social    Drug use: Yes     Frequency: 2.0 times per week     Types: Marijuana     Comment: medical edible marijuana prescribed    Sexual activity: Yes     Partners: Male     Birth control/protection: None     E-Cigarette/Vaping    E-Cigarette Use Never User      E-Cigarette/Vaping Substances    Nicotine No     THC No     CBD No     Flavoring No     Other No     Unknown No      Family History   Problem Relation Age of Onset    Alcohol abuse Mother     Mental illness Mother     Depression Mother     Alcohol abuse Father     COPD Maternal Grandmother     Lung cancer Maternal Grandmother 65    Cancer Maternal Grandmother     COPD Maternal Grandfather     Cancer Maternal Grandfather     COPD Paternal Grandmother     Lung cancer Paternal Grandmother 65    No Known Problems Sister     No Known Problems Paternal Aunt     Breast cancer Neg Hx     Ovarian cancer Neg Hx     Colon cancer Neg Hx        Objective      Temp:  [99.5 °F (37.5 °C)-100.3 °F  (37.9 °C)] 99.5 °F (37.5 °C)  HR:  [80-87] 87  Resp:  [18] 18  BP: (128-129)/(70-71) 128/71  Physical Exam  Vitals and nursing note reviewed.   Constitutional:       General: She is awake. She is not in acute distress.     Appearance: She is not ill-appearing, toxic-appearing or diaphoretic.      Comments: Appears uncomfortable.   Skin:     General: Skin is warm and dry.   Neurological:      Mental Status: She is alert and oriented to person, place, and time.      GCS: GCS eye subscore is 4. GCS verbal subscore is 5. GCS motor subscore is 6.   Psychiatric:         Attention and Perception: Attention normal.         Speech: Speech normal.         Behavior: Behavior normal. Behavior is cooperative.          Lab Results: I have reviewed the following results:   Estimated Creatinine Clearance: 40.6 mL/min (A) (by C-G formula based on SCr of 1.37 mg/dL (H)).  Lab Results   Component Value Date    WBC 6.66 09/23/2024    HGB 10.2 (L) 09/23/2024    HCT 32.9 (L) 09/23/2024     (L) 09/23/2024         Component Value Date/Time    K 3.7 09/23/2024 0514    K 4.7 08/18/2024 0535    CL 96 09/23/2024 0514    CL 99 (L) 08/18/2024 0535    CO2 33 (H) 09/23/2024 0514    CO2 25 08/18/2024 0535    BUN 36 (H) 09/23/2024 0514    BUN 33 (H) 08/18/2024 0535    BUN 31 (H) 01/12/2024 0945    CREATININE 1.37 (H) 09/23/2024 0514    CREATININE 1.15 (H) 08/18/2024 0535         Component Value Date/Time    CALCIUM 8.5 09/23/2024 0514    CALCIUM 8.2 (L) 08/18/2024 0535    ALKPHOS 74 09/20/2024 0748    ALKPHOS 67 05/30/2024 0447    AST 11 (L) 09/20/2024 0748    AST 12 (L) 05/30/2024 0447    AST 11 01/12/2024 0945    ALT 18 09/20/2024 0748    ALT 9 (L) 05/30/2024 0447    ALT 11 01/12/2024 0945    TP 7.0 09/20/2024 0748    TP 6.4 05/30/2024 0447    ALB 3.8 09/20/2024 0748    ALB 3.2 (L) 05/30/2024 0447    ALB 3.9 01/12/2024 0945       Imaging Review: No pertinent imaging studies reviewed.  Other Studies: No additional pertinent studies  reviewed.    Administrative Statements   I have spent a total time of 53 minutes in caring for this patient on the day of the visit/encounter including Risks and benefits of tx options, Instructions for management, Patient and family education, Importance of tx compliance, Risk factor reductions, Counseling / Coordination of care, Documenting in the medical record, Reviewing / ordering tests, medicine, procedures  , Obtaining or reviewing history  , and Communicating with other healthcare professionals .

## 2024-09-24 LAB
ANION GAP SERPL CALCULATED.3IONS-SCNC: 7 MMOL/L (ref 4–13)
BACTERIA UR CULT: NORMAL
BUN SERPL-MCNC: 36 MG/DL (ref 5–25)
CALCIUM SERPL-MCNC: 8.4 MG/DL (ref 8.4–10.2)
CHLORIDE SERPL-SCNC: 95 MMOL/L (ref 96–108)
CO2 SERPL-SCNC: 33 MMOL/L (ref 21–32)
CREAT SERPL-MCNC: 1.13 MG/DL (ref 0.6–1.3)
ERYTHROCYTE [DISTWIDTH] IN BLOOD BY AUTOMATED COUNT: 14.5 % (ref 11.6–15.1)
GFR SERPL CREATININE-BSD FRML MDRD: 58 ML/MIN/1.73SQ M
GLUCOSE SERPL-MCNC: 119 MG/DL (ref 65–140)
GLUCOSE SERPL-MCNC: 131 MG/DL (ref 65–140)
GLUCOSE SERPL-MCNC: 178 MG/DL (ref 65–140)
GLUCOSE SERPL-MCNC: 219 MG/DL (ref 65–140)
GLUCOSE SERPL-MCNC: 233 MG/DL (ref 65–140)
HCT VFR BLD AUTO: 31.6 % (ref 34.8–46.1)
HGB BLD-MCNC: 9.6 G/DL (ref 11.5–15.4)
MCH RBC QN AUTO: 28.8 PG (ref 26.8–34.3)
MCHC RBC AUTO-ENTMCNC: 30.4 G/DL (ref 31.4–37.4)
MCV RBC AUTO: 95 FL (ref 82–98)
PLATELET # BLD AUTO: 151 THOUSANDS/UL (ref 149–390)
PMV BLD AUTO: 11 FL (ref 8.9–12.7)
POTASSIUM SERPL-SCNC: 3.8 MMOL/L (ref 3.5–5.3)
RBC # BLD AUTO: 3.33 MILLION/UL (ref 3.81–5.12)
SODIUM SERPL-SCNC: 135 MMOL/L (ref 135–147)
WBC # BLD AUTO: 6.4 THOUSAND/UL (ref 4.31–10.16)

## 2024-09-24 PROCEDURE — 82948 REAGENT STRIP/BLOOD GLUCOSE: CPT

## 2024-09-24 PROCEDURE — 99233 SBSQ HOSP IP/OBS HIGH 50: CPT

## 2024-09-24 PROCEDURE — 99232 SBSQ HOSP IP/OBS MODERATE 35: CPT | Performed by: UROLOGY

## 2024-09-24 PROCEDURE — 80048 BASIC METABOLIC PNL TOTAL CA: CPT | Performed by: FAMILY MEDICINE

## 2024-09-24 PROCEDURE — 85027 COMPLETE CBC AUTOMATED: CPT | Performed by: FAMILY MEDICINE

## 2024-09-24 PROCEDURE — 94760 N-INVAS EAR/PLS OXIMETRY 1: CPT

## 2024-09-24 PROCEDURE — 94664 DEMO&/EVAL PT USE INHALER: CPT

## 2024-09-24 PROCEDURE — 99232 SBSQ HOSP IP/OBS MODERATE 35: CPT | Performed by: NURSE PRACTITIONER

## 2024-09-24 PROCEDURE — 94640 AIRWAY INHALATION TREATMENT: CPT

## 2024-09-24 RX ORDER — POLYETHYLENE GLYCOL 3350 17 G/17G
17 POWDER, FOR SOLUTION ORAL DAILY
Status: DISCONTINUED | OUTPATIENT
Start: 2024-09-24 | End: 2024-09-26 | Stop reason: HOSPADM

## 2024-09-24 RX ADMIN — HYDROMORPHONE HYDROCHLORIDE 1 MG: 1 INJECTION, SOLUTION INTRAMUSCULAR; INTRAVENOUS; SUBCUTANEOUS at 21:31

## 2024-09-24 RX ADMIN — OXYBUTYNIN CHLORIDE 5 MG: 5 TABLET ORAL at 21:24

## 2024-09-24 RX ADMIN — VALACYCLOVIR HYDROCHLORIDE 500 MG: 500 TABLET, FILM COATED ORAL at 08:23

## 2024-09-24 RX ADMIN — HYDROMORPHONE HYDROCHLORIDE 1 MG: 1 INJECTION, SOLUTION INTRAMUSCULAR; INTRAVENOUS; SUBCUTANEOUS at 04:43

## 2024-09-24 RX ADMIN — OXYCODONE HYDROCHLORIDE 10 MG: 10 TABLET ORAL at 23:13

## 2024-09-24 RX ADMIN — LEVALBUTEROL HYDROCHLORIDE 1.25 MG: 1.25 SOLUTION RESPIRATORY (INHALATION) at 20:10

## 2024-09-24 RX ADMIN — POLYETHYLENE GLYCOL 3350 17 G: 17 POWDER, FOR SOLUTION ORAL at 13:01

## 2024-09-24 RX ADMIN — INSULIN GLARGINE 12 UNITS: 100 INJECTION, SOLUTION SUBCUTANEOUS at 08:32

## 2024-09-24 RX ADMIN — PANCRELIPASE 48000 UNITS: 120000; 24000; 76000 CAPSULE, DELAYED RELEASE PELLETS ORAL at 08:23

## 2024-09-24 RX ADMIN — PHENAZOPYRIDINE HYDROCHLORIDE 100 MG: 100 TABLET ORAL at 11:39

## 2024-09-24 RX ADMIN — OXYBUTYNIN CHLORIDE 5 MG: 5 TABLET ORAL at 17:31

## 2024-09-24 RX ADMIN — OXYCODONE HYDROCHLORIDE 10 MG: 10 TABLET ORAL at 15:38

## 2024-09-24 RX ADMIN — INSULIN LISPRO 2 UNITS: 100 INJECTION, SOLUTION INTRAVENOUS; SUBCUTANEOUS at 21:24

## 2024-09-24 RX ADMIN — PREDNISONE 15 MG: 5 TABLET ORAL at 08:22

## 2024-09-24 RX ADMIN — INSULIN LISPRO 2 UNITS: 100 INJECTION, SOLUTION INTRAVENOUS; SUBCUTANEOUS at 08:33

## 2024-09-24 RX ADMIN — PANCRELIPASE 48000 UNITS: 120000; 24000; 76000 CAPSULE, DELAYED RELEASE PELLETS ORAL at 11:39

## 2024-09-24 RX ADMIN — OXYCODONE HYDROCHLORIDE 10 MG: 10 TABLET ORAL at 03:33

## 2024-09-24 RX ADMIN — LEVALBUTEROL HYDROCHLORIDE 1.25 MG: 1.25 SOLUTION RESPIRATORY (INHALATION) at 07:41

## 2024-09-24 RX ADMIN — MAGNESIUM OXIDE TAB 400 MG (241.3 MG ELEMENTAL MG) 400 MG: 400 (241.3 MG) TAB at 08:22

## 2024-09-24 RX ADMIN — SODIUM CHLORIDE SOLN NEBU 3% 4 ML: 3 NEBU SOLN at 07:41

## 2024-09-24 RX ADMIN — BUDESONIDE AND FORMOTEROL FUMARATE DIHYDRATE 2 PUFF: 160; 4.5 AEROSOL RESPIRATORY (INHALATION) at 08:23

## 2024-09-24 RX ADMIN — OXYCODONE HYDROCHLORIDE 10 MG: 10 TABLET ORAL at 08:22

## 2024-09-24 RX ADMIN — PREGABALIN 150 MG: 75 CAPSULE ORAL at 08:22

## 2024-09-24 RX ADMIN — TACROLIMUS 2 MG: 1 CAPSULE ORAL at 08:25

## 2024-09-24 RX ADMIN — INSULIN LISPRO 2 UNITS: 100 INJECTION, SOLUTION INTRAVENOUS; SUBCUTANEOUS at 17:31

## 2024-09-24 RX ADMIN — HYDROMORPHONE HYDROCHLORIDE 1 MG: 1 INJECTION, SOLUTION INTRAMUSCULAR; INTRAVENOUS; SUBCUTANEOUS at 13:00

## 2024-09-24 RX ADMIN — FAMOTIDINE 40 MG: 20 TABLET, FILM COATED ORAL at 21:24

## 2024-09-24 RX ADMIN — HYDROMORPHONE HYDROCHLORIDE 1 MG: 1 INJECTION, SOLUTION INTRAMUSCULAR; INTRAVENOUS; SUBCUTANEOUS at 09:08

## 2024-09-24 RX ADMIN — INSULIN LISPRO 2 UNITS: 100 INJECTION, SOLUTION INTRAVENOUS; SUBCUTANEOUS at 11:38

## 2024-09-24 RX ADMIN — PANTOPRAZOLE SODIUM 40 MG: 40 TABLET, DELAYED RELEASE ORAL at 04:43

## 2024-09-24 RX ADMIN — TAMSULOSIN HYDROCHLORIDE 0.4 MG: 0.4 CAPSULE ORAL at 17:31

## 2024-09-24 RX ADMIN — SODIUM CHLORIDE SOLN NEBU 3% 4 ML: 3 NEBU SOLN at 20:10

## 2024-09-24 RX ADMIN — OXYCODONE HYDROCHLORIDE 10 MG: 10 TABLET ORAL at 11:38

## 2024-09-24 RX ADMIN — APIXABAN 5 MG: 5 TABLET, FILM COATED ORAL at 17:31

## 2024-09-24 RX ADMIN — BUDESONIDE AND FORMOTEROL FUMARATE DIHYDRATE 2 PUFF: 160; 4.5 AEROSOL RESPIRATORY (INHALATION) at 17:32

## 2024-09-24 RX ADMIN — PHENAZOPYRIDINE HYDROCHLORIDE 100 MG: 100 TABLET ORAL at 08:23

## 2024-09-24 RX ADMIN — INSULIN LISPRO 1 UNITS: 100 INJECTION, SOLUTION INTRAVENOUS; SUBCUTANEOUS at 17:30

## 2024-09-24 RX ADMIN — PANCRELIPASE 48000 UNITS: 120000; 24000; 76000 CAPSULE, DELAYED RELEASE PELLETS ORAL at 17:31

## 2024-09-24 RX ADMIN — PHENAZOPYRIDINE HYDROCHLORIDE 100 MG: 100 TABLET ORAL at 17:31

## 2024-09-24 RX ADMIN — ATOVAQUONE 1500 MG: 750 SUSPENSION ORAL at 08:23

## 2024-09-24 RX ADMIN — INSULIN LISPRO 1 UNITS: 100 INJECTION, SOLUTION INTRAVENOUS; SUBCUTANEOUS at 11:38

## 2024-09-24 RX ADMIN — HYDROMORPHONE HYDROCHLORIDE 1 MG: 1 INJECTION, SOLUTION INTRAMUSCULAR; INTRAVENOUS; SUBCUTANEOUS at 17:32

## 2024-09-24 RX ADMIN — CEFTRIAXONE SODIUM 1000 MG: 10 INJECTION, POWDER, FOR SOLUTION INTRAVENOUS at 08:41

## 2024-09-24 RX ADMIN — PREGABALIN 150 MG: 75 CAPSULE ORAL at 17:31

## 2024-09-24 RX ADMIN — TACROLIMUS 2 MG: 1 CAPSULE ORAL at 21:24

## 2024-09-24 RX ADMIN — CYANOCOBALAMIN TAB 500 MCG 1000 MCG: 500 TAB at 08:21

## 2024-09-24 RX ADMIN — OXYBUTYNIN CHLORIDE 5 MG: 5 TABLET ORAL at 08:24

## 2024-09-24 RX ADMIN — APIXABAN 5 MG: 5 TABLET, FILM COATED ORAL at 08:21

## 2024-09-24 RX ADMIN — Medication 125 MG: at 08:24

## 2024-09-24 RX ADMIN — Medication 5000 UNITS: at 08:21

## 2024-09-24 NOTE — ASSESSMENT & PLAN NOTE
Patient is prescribed oxycodone 10 mg, 180 tablets for 30 days.  Patient gets this prescription once a month.  Patient fills this prescription monthly.  Patient states she takes only 1 or 2 tablets of this per month.

## 2024-09-24 NOTE — ASSESSMENT & PLAN NOTE
Lab Results   Component Value Date    HGBA1C 7.5 (H) 09/21/2024       Recent Labs     09/23/24  1129 09/23/24  1652 09/23/24  2140 09/24/24  0721   POCGLU 186* 287* 229* 131       Blood Sugar Average: Last 72 hrs:  (P) [id number]

## 2024-09-24 NOTE — ASSESSMENT & PLAN NOTE
Lab Results   Component Value Date    EGFR 58 09/24/2024    EGFR 46 09/23/2024    EGFR 46 09/22/2024    CREATININE 1.13 09/24/2024    CREATININE 1.37 (H) 09/23/2024    CREATININE 1.36 (H) 09/22/2024

## 2024-09-24 NOTE — UTILIZATION REVIEW
Continued Stay Review    Date: 9/24/24                          Current Patient Class: Inpatient  Current Level of Care: med/surg    HPI:48 y.o. female initially admitted on 9/20 with L renal calculus   S/p cystoscopy, ureteroscopy, holmium laser lithotripsy and insertion of left ureteral stent with basket stone extraction on 9/20     Assessment/Plan: Continues to have flank pain and dysuria. Continue Flomax/ oxybutynin/Pyridium. APS consulted for post pain management. Pt has h/o b/l lung transplant 2/2 cystic fibrosis and therefore cannot have NSAIDs.  Ketamine gtt was started yesterday.  Pt continues to c/o 10/10 pain. Increased Ketamine infusion to 0.2 mg/kg/h IV continuous. Continue Lyrica po BID, Tylenol, Oxy and Dilaudid prn. Continue heating pad prn, bowel regimen. S/p fever 9/22, afebrile since. IV Rocephin x 3 doses, hold on further abx. Continue to monitor. BC neg to date. Tacrolimus level originally 17+, discussed with [facility], tacrolimus was held.  Was restarted 9/22 at 2 mg twice daily with repeat level 9/23 of 7.3.  Goal trough level 6-8. # [phone]. Cr back to baseline. Diuretics held, can likely resume on d/c.    Vital Signs (last 3 days)       Date/Time Temp Pulse Resp BP MAP (mmHg) SpO2 Calculated FIO2 (%) - Nasal Cannula Nasal Cannula O2 Flow Rate (L/min) O2 Device Patient Position - Orthostatic VS Batsheva Coma Scale Score Pain    09/24/24 1138 -- -- -- -- -- -- -- -- -- -- -- 8    09/24/24 0908 -- -- -- -- -- -- -- -- -- -- 15 10 - Worst Possible Pain    09/24/24 0822 -- -- -- -- -- -- -- -- -- -- -- 9    09/24/24 0741 -- -- -- -- -- 96 % -- -- -- -- -- --    09/24/24 07:21:57 98.3 °F (36.8 °C) 76 18 142/89 107 92 % -- -- -- -- -- --    09/24/24 0443 -- -- -- -- -- -- -- -- -- -- -- 8    09/24/24 0333 -- -- -- -- -- -- -- -- None (Room air) -- -- 7    09/24/24 03:27:31 97.9 °F (36.6 °C) 74 18 -- -- 93 % -- -- -- -- -- --    09/23/24 2349 -- -- -- -- -- -- -- -- -- -- -- 7     09/23/24 23:48:19 98.7 °F (37.1 °C) 74 18 130/79 96 91 % -- -- -- -- -- 7 09/23/24 22:28:48 98.8 °F (37.1 °C) 78 15 120/70 87 91 % -- -- -- -- -- 7 09/23/24 21:37:12 -- 82 16 116/68 84 91 % -- -- -- -- -- 7 09/23/24 20:49:45 99.8 °F (37.7 °C) 85 15 137/82 100 93 % -- -- -- -- -- 7 09/23/24 2019 -- -- -- -- -- -- -- -- None (Room air) -- 15 6 09/23/24 19:34:29 98.3 °F (36.8 °C) 81 14 136/78 97 98 % -- -- None (Room air) -- -- 6 09/23/24 1919 -- -- -- -- -- -- -- -- -- -- -- 7 09/23/24 1914 -- -- -- -- -- 94 % -- -- None (Room air) -- -- --    09/23/24 19:09:59 99.1 °F (37.3 °C) 81 14 138/79 99 92 % -- -- -- -- -- 7 09/23/24 18:24:39 98.8 °F (37.1 °C) 77 -- 137/76 96 92 % -- -- -- -- -- --    09/23/24 18:23:51 -- 76 -- 137/76 96 90 % -- -- -- -- -- --    09/23/24 1800 -- -- -- -- -- -- -- -- -- -- -- 6 09/23/24 17:21:51 -- 77 -- 149/79 102 92 % -- -- -- Sitting -- 8 09/23/24 1716 -- -- -- -- -- -- -- -- -- -- -- 8 09/23/24 16:46:59 98.3 °F (36.8 °C) 78 18 126/72 90 92 % -- -- None (Room air) Sitting -- 8 09/23/24 15:16:29 99.5 °F (37.5 °C) 87 18 128/71 90 93 % -- -- -- -- -- --    09/23/24 1512 -- -- -- -- -- -- -- -- -- -- -- 8 09/23/24 1144 -- -- -- -- -- -- -- -- -- -- -- 7 09/23/24 0951 -- -- -- -- -- -- -- -- -- -- -- 9 09/23/24 0854 -- -- -- -- -- -- -- -- -- -- -- 9 09/23/24 0816 -- -- -- -- -- 99 % -- -- -- -- -- --    09/23/24 08:11:13 100 °F (37.8 °C) 84 18 129/70 90 95 % -- -- -- -- -- --    09/23/24 0657 -- -- -- -- -- -- -- -- -- -- -- 10 - Worst Possible Pain    09/23/24 0513 -- -- -- -- -- -- -- -- -- -- -- 9 09/23/24 0238 -- -- -- -- -- -- -- -- -- -- -- 9 09/23/24 0026 -- -- -- -- -- -- -- -- -- -- -- 9 09/22/24 2218 -- -- -- -- -- -- -- -- -- -- -- 9 09/22/24 2202 -- -- -- -- -- -- -- -- -- -- -- Med Not Given for Pain - for MAR use only    09/22/24 22:00:20 100.3 °F (37.9 °C) 80 18 129/70 90 93 % -- -- None (Room air) Lying -- --     09/22/24 2200 -- -- -- -- -- -- -- -- -- -- 15 --    09/22/24 2031 -- -- -- -- -- -- -- -- -- -- -- 9 09/22/24 1935 -- -- -- -- -- 95 % -- -- None (Room air) -- -- --    09/22/24 1713 -- -- -- -- -- -- -- -- -- -- -- 9 09/22/24 1517 -- -- -- -- -- -- -- -- -- -- -- 8    09/22/24 15:09:43 98.8 °F (37.1 °C) 79 18 127/71 90 93 % -- -- -- Sitting -- --    09/22/24 15:09:27 98.8 °F (37.1 °C) 78 -- 127/71 90 93 % -- -- -- -- -- --    09/22/24 1336 -- -- -- -- -- -- -- -- -- -- -- 9 09/22/24 1133 -- -- -- -- -- -- -- -- -- -- -- Med Not Given for Pain - for MAR use only    09/22/24 1110 -- -- -- -- -- 93 % 28 2 L/min Nasal cannula -- -- --    09/22/24 1100 101.2 °F (38.4 °C) -- -- -- -- -- -- -- -- -- -- --    09/22/24 1030 -- -- -- -- -- -- -- -- -- -- -- 8    09/22/24 0845 -- -- -- -- -- -- -- -- None (Room air) -- 15 10 - Worst Possible Pain    09/22/24 0815 -- -- -- -- -- -- -- -- -- -- -- Med Not Given for Pain - for MAR use only    09/22/24 08:09:51 102.9 °F (39.4 °C) 92 -- 141/74 96 93 % -- -- -- -- -- --    09/22/24 0631 -- -- -- -- -- -- -- -- -- -- -- 8    09/22/24 0443 -- -- -- -- -- -- -- -- -- -- -- 7    09/22/24 0004 -- -- -- -- -- -- -- -- -- -- -- 8         Pertinent Labs/Diagnostic Results:   Radiology:  CT abdomen pelvis wo contrast   Final Interpretation by Gina Yanes MD (09/23 2924)      Interval lithotripsy of previously noted large calculus in the left renal pelvis, now with multiple small stone fragments measuring up to 4 mm noted in the interpolar and lower pole calyces. A left ureteral stent is seen in place, in good position. No    hydronephrosis.           Results from last 7 days   Lab Units 09/22/24  0832   SARS-COV-2  Negative     Results from last 7 days   Lab Units 09/24/24  0447 09/23/24  0514 09/22/24  0449 09/21/24  0435 09/20/24  0748   WBC Thousand/uL 6.40 6.66 6.57 8.57 9.14   HEMOGLOBIN g/dL 9.6* 10.2* 10.5* 11.9 12.5   HEMATOCRIT % 31.6* 32.9* 34.7* 39.4 40.6    PLATELETS Thousands/uL 151 130* 136* 147* 167   TOTAL NEUT ABS Thousands/µL  --   --   --  6.43 6.16     Results from last 7 days   Lab Units 09/24/24  0447 09/23/24  0514 09/22/24  0449 09/21/24  0435 09/20/24  0748   SODIUM mmol/L 135 135 134* 135 136   POTASSIUM mmol/L 3.8 3.7 4.1 4.9 4.3   CHLORIDE mmol/L 95* 96 97 100 97   CO2 mmol/L 33* 33* 31 29 33*   ANION GAP mmol/L 7 6 6 6 6   BUN mg/dL 36* 36* 29* 26* 31*   CREATININE mg/dL 1.13 1.37* 1.36* 1.08 1.21   EGFR ml/min/1.73sq m 58 46 46 61 53   CALCIUM mg/dL 8.4 8.5 8.4 8.6 8.9   MAGNESIUM mg/dL  --   --   --  1.8*  --      Results from last 7 days   Lab Units 09/24/24  1109 09/24/24  0721 09/23/24  2140 09/23/24  1652 09/23/24  1129 09/23/24  0810 09/22/24  2104 09/22/24  1612 09/22/24  1106 09/22/24  0808 09/21/24  2039 09/21/24  1707   POC GLUCOSE mg/dl 178* 131 229* 287* 186* 181* 196* 358* 136 137 187* 317*     Results from last 7 days   Lab Units 09/24/24  0447 09/23/24  0514 09/22/24  0449 09/21/24  0435 09/20/24  0748   GLUCOSE RANDOM mg/dL 119 192* 152* 153* 144*     Results from last 7 days   Lab Units 09/22/24  0927 09/20/24  0748   LACTIC ACID mmol/L 1.2 0.9       Results from last 7 days   Lab Units 09/22/24  1338 09/20/24  0748   CLARITY UA  Turbid Clear   COLOR UA  Dark Yellow Light Yellow   SPEC GRAV UA  1.006 1.009   PH UA  5.5 6.5   GLUCOSE UA mg/dl Negative 200 (1/5%)*   KETONES UA mg/dl Negative Negative   BLOOD UA  Large* Large*   PROTEIN UA mg/dl 70 (1+)* Negative   NITRITE UA  Negative Negative   BILIRUBIN UA  Negative Negative   UROBILINOGEN UA (BE) mg/dl <2.0 <2.0   LEUKOCYTES UA  Moderate* Negative   WBC UA /hpf 20-30* 2-4*   RBC UA /hpf Innumerable* Innumerable*   BACTERIA UA /hpf Innumerable* None Seen   EPITHELIAL CELLS WET PREP /hpf Occasional None Seen   MUCUS THREADS  Occasional*  --      Results from last 7 days   Lab Units 09/22/24  0832   INFLUENZA A PCR  Negative   INFLUENZA B PCR  Negative   RSV PCR  Negative     Results  from last 7 days   Lab Units 09/22/24  1338 09/22/24  0927 09/22/24  0926 09/20/24  1452   BLOOD CULTURE   --  No Growth at 24 hrs. No Growth at 24 hrs.  --    URINE CULTURE  40,000-49,000 cfu/ml  --   --  60,000-69,000 cfu/ml Candida albicans*  50,000-59,000 cfu/ml Candida glabrata*     Medications:   Scheduled Medications:  apixaban, 5 mg, Oral, BID  ascorbic acid, 125 mg, Oral, Daily  atovaquone, 1,500 mg, Oral, Daily  azithromycin, 250 mg, Oral, Once per day on Monday Wednesday Friday  budesonide-formoterol, 2 puff, Inhalation, BID  Cholecalciferol, 5,000 Units, Oral, Daily  vitamin B-12, 1,000 mcg, Oral, Daily  famotidine, 40 mg, Oral, HS  insulin glargine, 12 Units, Subcutaneous, QAM  insulin lispro, 1-5 Units, Subcutaneous, TID AC  insulin lispro, 1-5 Units, Subcutaneous, HS  insulin lispro, 2 Units, Subcutaneous, TID With Meals  levalbuterol, 1.25 mg, Nebulization, BID  magnesium Oxide, 400 mg, Oral, Daily  oxybutynin, 5 mg, Oral, TID  pancrelipase (Lip-Prot-Amyl), 48,000 Units, Oral, TID With Meals  pantoprazole, 40 mg, Oral, Early Morning  phenazopyridine, 100 mg, Oral, TID With Meals  polyethylene glycol, 17 g, Oral, Daily  predniSONE, 15 mg, Oral, Daily  pregabalin, 150 mg, Oral, BID  sodium chloride, 4 mL, Nebulization, BID  tacrolimus, 2 mg, Oral, Q12H DOTTY  tamsulosin, 0.4 mg, Oral, Daily With Dinner  valACYclovir, 500 mg, Oral, Daily    Continuous IV Infusions:  ketamine, 0.2 mg/kg/hr, Intravenous, Continuous    PRN Meds:  acetaminophen, 975 mg, Oral, Q6H PRN  albuterol, 2 puff, Inhalation, Q4H PRN  ALPRAZolam, 1 mg, Oral, HS PRN  dextromethorphan-guaiFENesin, 5 mL, Oral, Q6H PRN  glycopyrrolate, 0.2 mg, Intravenous, Q4H PRN  haloperidol lactate, 2 mg, Intramuscular, Q30 Min PRN  HYDROmorphone, 1 mg, Intravenous, Q4H PRN 9/22 x5, 9/23 x6, 9/24 x3  LORazepam, 1 mg, Intravenous, Q1H PRN  naloxone, 0.04 mg, Intravenous, Q1MIN PRN  ondansetron, 4 mg, Oral, Q6H PRN  oxyCODONE, 5 mg, Oral, Q4H PRN    Or  oxyCODONE, 10 mg, Oral, Q4H PRN 9/22 x5, 9/23 x5, 9/24 x3        Discharge Plan: home when pain controlled.      Network Utilization Review Department  ATTENTION: Please call with any questions or concerns to [phone] and carefully listen to the prompts so that you are directed to the right person. All voicemails are confidential.   For Discharge needs, contact Care Management DC Support Team at [phone] opt. [phone]  Send all requests for admission clinical reviews, approved or denied determinations and any other requests to dedicated fax number below belonging to the campus where the patient is receiving treatment. List of dedicated fax numbers for the Facilities:  FACILITY NAME UR FAX NUMBER   ADMISSION DENIALS (Administrative/Medical Necessity) [phone]   DISCHARGE SUPPORT TEAM (NETWORK) [phone]   PARENT CHILD HEALTH (Maternity/NICU/Pediatrics) [phone]   [facility] [phone]   [facility] [phone]   formerly [facility] [phone]   [facility] [phone]   [facility] [phone]   [facility] [phone]   [facility] [phone]   [facility] [phone]   [facility] [phone]   [facility] [phone]   [facility] [phone]   [facility] [phone]

## 2024-09-24 NOTE — ASSESSMENT & PLAN NOTE
Patient had fever of 102 on 9/22/2024, afebrile since   Possibly secondary to viral infection has had sore throat with negative strep workup.  Repeat CT abdomen pelvis shows multiple small stone fragments due to recent lithotripsy, however no hydronephrosis noted.  Patient is immunocompromised therefore was started on empiric IV Rocephin, completed 3 doses.  Urine culture with mixed contaminants, will hold off on further antibiotics.  BC negative to date

## 2024-09-24 NOTE — PROGRESS NOTES
Patient pain consistently in the 6/7 range out of 10.  Offered to notify physician regarding pain control but patient refused.  Maximilian would like to keep the infusion as it is now and re-assess in the morning.

## 2024-09-24 NOTE — ASSESSMENT & PLAN NOTE
Lab Results   Component Value Date    EGFR 58 09/24/2024    EGFR 46 09/23/2024    EGFR 46 09/22/2024    CREATININE 1.13 09/24/2024    CREATININE 1.37 (H) 09/23/2024    CREATININE 1.36 (H) 09/22/2024   Baseline creatinine 1 to 1.2, currently stable.  Diuretics held can likely resume on discharge

## 2024-09-24 NOTE — ASSESSMENT & PLAN NOTE
Lab Results   Component Value Date    HGBA1C 7.5 (H) 09/21/2024       Recent Labs     09/23/24  1652 09/23/24  2140 09/24/24  0721 09/24/24  1109   POCGLU 287* 229* 131 178*       Home regimen: Lantus 12 units in am, Aspart 6 units TID  Continue Lantus 12 units in am, Humalog at 2 units TID + SSI  Monitor blood sugars and adjust insulin accordingly

## 2024-09-24 NOTE — PROGRESS NOTES
Progress Note - Acute Pain   Name: Maximilian Mann 48 y.o. female I MRN: [id number]  Unit/Bed#: [facility] 934-01 I Date of Admission: 9/20/2024   Date of Service: 9/24/2024 I Hospital Day: 4    Assessment & Plan  Renal calculus, left  Lyrica 150 mg p.o. twice daily.  Tylenol 975 mg p.o. every 6 hours as needed mild pain.  Oxycodone 5 mg p.o. every 4 hours as needed moderate pain or 10 mg p.o. every 4 hours as needed severe pain.  Dilaudid 1 mg IV every 4 hours as needed breakthrough pain.  Will plan to decrease the dose of this tomorrow.  Increased Ketamine infusion to 0.2 mg/kg/h IV continuous.  As needed glycopyrrolate, Haldol, Ativan to mitigate any potential side effects from ketamine infusion.  If patient were to be discharged within the next 24-48 hours, ketamine able to be discontinued without wean.   Continue heating pad as needed.  Add as needed Narcan in the event that opioid reversal is necessary.  Bowel regimen to avoid opioid-induced constipation while on opioid pain medication.  Stage 3a chronic kidney disease ([facility])  Lab Results   Component Value Date    EGFR 58 09/24/2024    EGFR 46 09/23/2024    EGFR 46 09/22/2024    CREATININE 1.13 09/24/2024    CREATININE 1.37 (H) 09/23/2024    CREATININE 1.36 (H) 09/22/2024   Estimated Creatinine Clearance: 49.2 mL/min (by C-G formula based on SCr of 1.13 mg/dL).  Will attempt to minimize nephrotoxic pain medications.  Hx of lung transplant (HCC)  Will avoid NSAIDs.  Continuous opioid dependence (HCC)  Patient is prescribed oxycodone 10 mg, 180 tablets for 30 days.  Patient gets this prescription once a month.  Patient fills this prescription monthly.  Patient states she takes only 1 or 2 tablets of this per month.  Type 1 diabetes mellitus with diabetic chronic kidney disease, unspecified CKD stage (HCC)  Lab Results   Component Value Date    HGBA1C 7.5 (H) 09/21/2024       Recent Labs     09/23/24  1129 09/23/24  1652 09/23/24  2140 09/24/24  0721   POCGLU  186* 287* 229* 131       Blood Sugar Average: Last 72 hrs:  (P) [id number]      APS will continue to follow. Please contact Acute Pain Service - via SecureChat from [phone] with additional questions or concerns. See SecureChat or Karis for additional contacts and after hours information.     History of Present Illness   Maximilian Mann is a 48 y.o. female  who presents with left flank pain beginning 9/20/2024.  Patient was seen in the emergency department and transferred to [place] for further management.  She underwent cystoscopy, ureteroscopy, holmium laser lithotripsy and insertion of left ureteral stent with basket stone extraction on 9/20/2024.  Since that time, patient has continued to have sharp, stabbing left flank pain radiating into the left lower quadrant.  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 Acute Pain Service consulted in aid of post-operative pain.        Pain History  Current pain location(s):  Pain Score: 10  Pain Location/Orientation: Location: Back  Pain Scale: Pain Assessment Tool: 0-10  24 hour history: No acute events.    Opioid requirement previous 24 hours:   Hydromorphone IV 5.5 mg  Oxycodone PO 40 mg    Meds/Allergies   all current active meds have been reviewed, current meds:   Current Facility-Administered Medications:     acetaminophen (TYLENOL) tablet 975 mg, Q6H PRN    albuterol (PROVENTIL HFA,VENTOLIN HFA) inhaler 2 puff, Q4H PRN    ALPRAZolam (XANAX) tablet 1 mg, HS PRN    apixaban (ELIQUIS) tablet 5 mg, BID    ascorbic acid (VITAMIN C) tablet 125 mg, Daily    atovaquone (MEPRON) oral suspension 1,500 mg, Daily    azithromycin (ZITHROMAX) tablet 250 mg, Once per day on Monday Wednesday Friday    budesonide-formoterol (SYMBICORT) 160-4.5 mcg/act inhaler 2 puff, BID    cefTRIAXone (ROCEPHIN) 1,000 mg in dextrose 5 % 50 mL IVPB, Q24H, Last Rate: 1,000 mg (09/24/24 0841)    Cholecalciferol (VITAMIN  D3) tablet 5,000 Units, Daily    cyanocobalamin (VITAMIN B-12) tablet 1,000 mcg, Daily    dextromethorphan-guaiFENesin (ROBITUSSIN DM) oral syrup 5 mL, Q6H PRN    famotidine (PEPCID) tablet 40 mg, HS    glycopyrrolate (ROBINUL) injection 0.2 mg, Q4H PRN    haloperidol lactate (HALDOL) injection 2 mg, Q30 Min PRN    HYDROmorphone (DILAUDID) injection 1 mg, Q4H PRN    insulin glargine (LANTUS) subcutaneous injection 12 Units 0.12 mL, QAM    insulin lispro (HumALOG/ADMELOG) 100 units/mL subcutaneous injection 1-5 Units, TID AC **AND** Fingerstick Glucose (POCT), TID AC    insulin lispro (HumALOG/ADMELOG) 100 units/mL subcutaneous injection 1-5 Units, HS    insulin lispro (HumALOG/ADMELOG) 100 units/mL subcutaneous injection 2 Units, TID With Meals    ketamine 250 mg (STANDARD CONCENTRATION) IV in sodium chloride 0.9% 250 mL, Continuous, Last Rate: 0.1 mg/kg/hr (09/23/24 1639)    levalbuterol (XOPENEX) inhalation solution 1.25 mg, BID    LORazepam (ATIVAN) injection 1 mg, Q1H PRN    magnesium Oxide (MAG-OX) tablet 400 mg, Daily    naloxone (NARCAN) 0.04 mg/mL syringe 0.04 mg, Q1MIN PRN    ondansetron (ZOFRAN-ODT) dispersible tablet 4 mg, Q6H PRN    oxybutynin (DITROPAN) tablet 5 mg, TID    oxyCODONE (ROXICODONE) IR tablet 5 mg, Q4H PRN **OR** oxyCODONE (ROXICODONE) immediate release tablet 10 mg, Q4H PRN    pancrelipase (Lip-Prot-Amyl) (CREON) delayed release capsule 48,000 Units, TID With Meals    pantoprazole (PROTONIX) EC tablet 40 mg, Early Morning    phenazopyridine (PYRIDIUM) tablet 100 mg, TID With Meals    predniSONE tablet 15 mg, Daily    pregabalin (LYRICA) capsule 150 mg, BID    sodium chloride 3 % inhalation solution 4 mL, BID    tacrolimus (PROGRAF) capsule 2 mg, Q12H DOTTY    tamsulosin (FLOMAX) capsule 0.4 mg, Daily With Dinner    valACYclovir (VALTREX) tablet 500 mg, Daily, and PTA meds:   Prior to Admission Medications   Prescriptions Last Dose Informant Patient Reported? Taking?   ALPRAZolam (XANAX) 1 mg  tablet   No No   Sig: Take 1 tablet (1 mg total) by mouth daily at bedtime as needed for anxiety or sleep   Ascorbic Acid (vitamin C) 100 MG tablet  Self Yes No   Sig: Take 100 mg by mouth daily   BD Pen Needle Abi U/F 32G X 4 MM MISC  Self No No   Si times daily   Cholecalciferol (Vitamin D3) 125 MCG (5000 UT) TABS  Self Yes No   Sig: Take 5,000 Units by mouth daily   Continuous Blood Gluc  (Dexcom G7 ) SUKHI  Self No No   Sig: Use 1 each continuous   Continuous Glucose Sensor (Dexcom G7 Sensor)  Self No No   Sig: Use 1 Device every 10 days   Insulin Disposable Pump (Omnipod 5 G6 Intro, Gen 5,) KIT  Self No No   Sig: Use 1 Units continuous   Insulin Disposable Pump (Omnipod 5 G6 Pods, Gen 5,) MISC  Self No No   Sig: Use 1 Units every other day   Insulin Glargine-yfgn (Semglee, yfgn,) 100 UNIT/ML SOPN   No No   Sig: Inject 0.12 mL (12 Units total) under the skin every morning   Insulin Pen Needle (Comfort EZ Pen Needles) 33G X 4 MM MISC  Self No No   Sig: Use to inject insulin 4 times a day   Respiratory Therapy Supplies (Nebulizer) SUKHI  Self Yes No   Sig: Please dispense nebulizer machine   Turmeric 500 MG CAPS  Self Yes No   Sig: Take by mouth   acetaminophen (TYLENOL) 325 mg tablet  Self No No   Sig: Take 3 tablets (975 mg total) by mouth every 8 (eight) hours   albuterol (2.5 mg/3 mL) 0.083 % nebulizer solution  Self No No   Sig: Take 6 mL (5 mg total) by nebulization 2 (two) times a day   albuterol (PROVENTIL HFA,VENTOLIN HFA) 90 mcg/act inhaler  Self Yes No   Sig: INHALE 2 PUFFS EVERY 8 HOURS AS NEEDED FOR SHORTNESS OF BREATH OR WHEEZING.   apixaban (ELIQUIS) 5 mg  Self No No   Sig: Take 1 tablet (5 mg total) by mouth 2 (two) times a day   atovaquone (MEPRON) 750 mg/5 mL suspension  Self Yes No   Sig: Take 1,500 mg by mouth daily   azithromycin (ZITHROMAX) 250 mg tablet  Self Yes No   Sig: Take 250 mg by mouth 3 (three) times a week   bisacodyl (DULCOLAX) 5 mg EC tablet  Self No No   Sig:  Take 1 tablet (5 mg total) by mouth daily at bedtime   dexlansoprazole (DEXILANT) 60 MG capsule  Self No No   Sig: TAKE 1 CAPSULE BY MOUTH EVERY DAY IN THE MORNING   doxycycline hyclate (VIBRA-TABS) 100 mg tablet  Self Yes No   Sig: Take 100 mg by mouth 2 (two) times a day   Patient not taking: Reported on 2024   dronabinol (MARINOL) 5 MG capsule  Self No No   Sig: Take 1 capsule (5 mg total) by mouth 3 (three) times a day   famotidine (PEPCID) 40 MG tablet  Self No No   Sig: Take 1 tablet (40 mg total) by mouth daily at bedtime   insulin aspart (NovoLOG FlexPen) 100 UNIT/ML injection pen   No No   Sig: INJECT 10 UNITS pre-BREAKFAST, 8 UNITS pre-LUNCH and 12 UNITS pre-DINNER plus scale; max 50 UNITS/day   multivitamin (THERAGRAN) TABS  Self Yes No   Sig: Take 1 tablet by mouth daily   naloxone (NARCAN) 4 mg/0.1 mL nasal spray  Self No No   Si.1 mL (4 mg total) into each nostril every 3 (three) minutes as needed for opioid reversal or respiratory depression   nystatin (MYCOSTATIN) 500,000 units/5 mL suspension  Self No No   Sig: Apply 5 mL (500,000 Units total) to the mouth or throat 4 (four) times a day   Patient not taking: Reported on 2024   ondansetron (ZOFRAN) 4 mg tablet  Self Yes No   Sig: Take 4 mg by mouth   Patient not taking: Reported on 2024   oxyCODONE (ROXICODONE) 10 MG TABS   No No   Sig: Take 1 to 2 tablets every 8hs as needed for severe pain. Max dose 6 tabs a day   oxybutynin (DITROPAN XL) 15 MG 24 hr tablet   No No   Sig: Take 1 tablet (15 mg total) by mouth daily at bedtime   pancrelipase, Lip-Prot-Amyl, (CREON) 24,000 units  Self Yes No   Sig: Take 48,000 Units by mouth 3 (three) times a day with meals   polyethylene glycol (MIRALAX) 17 g packet  Self No No   Sig: Take 17 g by mouth 2 (two) times a day   predniSONE 10 mg tablet  Self Yes No   Sig: Take 10 mg by mouth daily   pregabalin (LYRICA) 150 mg capsule  Self No No   Sig: Take 1 capsule (150 mg total) by mouth 2 times a day    promethazine-dextromethorphan (PHENERGAN-DM) 6.25-15 mg/5 mL oral syrup  Self No No   Sig: Take 5 mL by mouth 4 (four) times a day as needed for cough   tacrolimus (PROGRAF) 1 mg capsule  Self Yes No   Sig: Take 2.5 mg by mouth 2 (two) times a day Take 2 tablets and a 0.5 table to total 2.5 mg twice a day 5mg total daily   valACYclovir (VALTREX) 500 mg tablet  Self Yes No   Sig: Take 500 mg by mouth daily   vitamin B-12 (VITAMIN B-12) 1,000 mcg tablet  Self Yes No   Sig: Take by mouth daily      Facility-Administered Medications: None     Allergies   Allergen Reactions    Bebtelovimab Anaphylaxis    Vancomycin Angioedema and Hives    Gabapentin Hallucinations    Ceftazidime Headache     Severe headaches after desensitization    Nsaids Other (See Comments)     Lung transplant increases risk of renal toxicity, call lung txp provider to discuss if needed       Objective      Temp:  [97.9 °F (36.6 °C)-99.8 °F (37.7 °C)] 98.3 °F (36.8 °C)  HR:  [74-87] 76  Resp:  [14-18] 18  BP: (116-149)/(68-89) 142/89  Physical Exam  Vitals and nursing note reviewed.   Constitutional:       General: She is not in acute distress.     Appearance: She is well-developed.   HENT:      Head: Normocephalic and atraumatic.      Right Ear: External ear normal.      Left Ear: External ear normal.   Eyes:      Conjunctiva/sclera: Conjunctivae normal.   Cardiovascular:      Rate and Rhythm: Normal rate and regular rhythm.      Pulses: Normal pulses.   Pulmonary:      Effort: Pulmonary effort is normal. No respiratory distress.      Breath sounds: Normal breath sounds.   Abdominal:      Palpations: Abdomen is soft.      Tenderness: There is no abdominal tenderness.   Musculoskeletal:         General: No swelling.      Cervical back: Neck supple.   Skin:     General: Skin is warm and dry.      Capillary Refill: Capillary refill takes less than 2 seconds.   Neurological:      Mental Status: She is alert and oriented to person, place, and time.    Psychiatric:         Mood and Affect: Mood normal.         Behavior: Behavior normal.            Lab Results: I have reviewed the following results:   Estimated Creatinine Clearance: 49.2 mL/min (by C-G formula based on SCr of 1.13 mg/dL).  Lab Results   Component Value Date    WBC 6.40 09/24/2024    HGB 9.6 (L) 09/24/2024    HCT 31.6 (L) 09/24/2024     09/24/2024         Component Value Date/Time    K 3.8 09/24/2024 0447    K 4.7 08/18/2024 0535    CL 95 (L) 09/24/2024 0447    CL 99 (L) 08/18/2024 0535    CO2 33 (H) 09/24/2024 0447    CO2 25 08/18/2024 0535    BUN 36 (H) 09/24/2024 0447    BUN 33 (H) 08/18/2024 0535    BUN 31 (H) 01/12/2024 0945    CREATININE 1.13 09/24/2024 0447    CREATININE 1.15 (H) 08/18/2024 0535         Component Value Date/Time    CALCIUM 8.4 09/24/2024 0447    CALCIUM 8.2 (L) 08/18/2024 0535    ALKPHOS 74 09/20/2024 0748    ALKPHOS 67 05/30/2024 0447    AST 11 (L) 09/20/2024 0748    AST 12 (L) 05/30/2024 0447    AST 11 01/12/2024 0945    ALT 18 09/20/2024 0748    ALT 9 (L) 05/30/2024 0447    ALT 11 01/12/2024 0945    TP 7.0 09/20/2024 0748    TP 6.4 05/30/2024 0447    ALB 3.8 09/20/2024 0748    ALB 3.2 (L) 05/30/2024 0447    ALB 3.9 01/12/2024 0945     Imaging Review: No pertinent imaging studies reviewed.  Other Studies: No additional pertinent studies reviewed.     Administrative Statements   I have spent a total time of 30 minutes in caring for this patient on the day of the visit/encounter including Counseling / Coordination of care, Documenting in the medical record, Reviewing / ordering tests, medicine, procedures  , Obtaining or reviewing history  , and Communicating with other healthcare professionals .

## 2024-09-24 NOTE — ASSESSMENT & PLAN NOTE
Lyrica 150 mg p.o. twice daily.  Tylenol 975 mg p.o. every 6 hours as needed mild pain.  Oxycodone 5 mg p.o. every 4 hours as needed moderate pain or 10 mg p.o. every 4 hours as needed severe pain.  Dilaudid 1 mg IV every 4 hours as needed breakthrough pain.  Will plan to decrease the dose of this tomorrow.  Increased Ketamine infusion to 0.2 mg/kg/h IV continuous.  As needed glycopyrrolate, Haldol, Ativan to mitigate any potential side effects from ketamine infusion.  If patient were to be discharged within the next 24-48 hours, ketamine able to be discontinued without wean.   Continue heating pad as needed.  Add as needed Narcan in the event that opioid reversal is necessary.  Bowel regimen to avoid opioid-induced constipation while on opioid pain medication.

## 2024-09-24 NOTE — ASSESSMENT & PLAN NOTE
S/p cystoscopy ureteroscopy, laser lithotripsy basket traction ureteral stent insertion on the left 9/22.  follow-up with urology in 2 weeks for repeat CT scan if large stone burden will require second definitive stone treatment.  If low stone burden we will plan for stent removal.  Again discussed with patient what to expect postoperative with ureteral stent including frequency, urgency, flank pain with urination, burning with urination and intermittent hematuria as long as ureteral stent is in place.  Continue Flomax, oxybutynin, Pyridium (renal function permitting) as needed for any stent colic.  Additional medications per pain management team (Lyrica, Tylenol, oxycodone, Dilaudid, ketamine, as needed Ativan)  Daily bowel regimen to prevent/alleviate constipation  Recommend at least 2 L of water a day and avoidance of bladder irritants

## 2024-09-24 NOTE — PROGRESS NOTES
Progress Note - Urology   Name: Maximilian Mann 48 y.o. female I MRN: [id number]  [facility] Date of Admission: 9/20/2024   Date of Service: 9/24/2024 I Hospital Day: 4    Assessment & Plan  Renal calculus, left  S/p cystoscopy ureteroscopy, laser lithotripsy basket traction ureteral stent insertion on the left 9/22.  follow-up with urology in 2 weeks for repeat CT scan if large stone burden will require second definitive stone treatment.  If low stone burden we will plan for stent removal.  Again discussed with patient what to expect postoperative with ureteral stent including frequency, urgency, flank pain with urination, burning with urination and intermittent hematuria as long as ureteral stent is in place.  Continue Flomax, oxybutynin, Pyridium (renal function permitting) as needed for any stent colic.  Additional medications per pain management team (Lyrica, Tylenol, oxycodone, Dilaudid, ketamine, as needed Ativan)  Daily bowel regimen to prevent/alleviate constipation  Recommend at least 2 L of water a day and avoidance of bladder irritants      Stage 3a chronic kidney disease (HCC)  Lab Results   Component Value Date    EGFR 58 09/24/2024    EGFR 46 09/23/2024    EGFR 46 09/22/2024    CREATININE 1.13 09/24/2024    CREATININE 1.37 (H) 09/23/2024    CREATININE 1.36 (H) 09/22/2024     Continuous opioid dependence (HCC)  Per primary  On ketamine drip    Ok for discharge from Urology service perspective. Please contact the SecureChat role for the Urology service with any questions/concerns.    History of Present Illness   Urology was asked to evaluate patient due to ongoing dysuria status post cystoscopy, ureteroscopy, laser lithotripsy, retrograde pyelogram and stent placement on 9/20/2024 due to large hard renal stone obstructing lower pole with significant pain.    Objective      Temp:  [97.8 °F (36.6 °C)-99.8 °F (37.7 °C)] 97.8 °F (36.6 °C)  HR:  [73-85] 73  Resp:  [14-18] 18  BP:  (116-149)/(68-89) 137/79  O2 Device: None (Room air)          I/O         09/22 0701  09/23 0700 09/23 0701  09/24 0700 09/24 0701  09/25 0700    P.O. 1380 1040 280    Total Intake(mL/kg) 1380 (24.4) 1040 (18.4) 280 (4.9)    Urine (mL/kg/hr) 3150 (2.3) 2100 (1.5) 800 (1.4)    Stool 0      Total Output 3150 2100 800    Net -1770 -1060 -520           Unmeasured Stool Occurrence 0 x            Lines/Drains/Airways       Active Status       Name Placement date Placement time Site Days    Port A Cath 11/25/22 Right Chest 11/25/22  0953  Chest  669    Port A Cath 11/25/22 Right Chest 11/25/22  1201  Chest  669                  Physical Exam  Constitutional:       General: She is not in acute distress.     Appearance: Normal appearance. She is not ill-appearing.   HENT:      Head: Normocephalic and atraumatic.   Eyes:      General: No scleral icterus.  Cardiovascular:      Rate and Rhythm: Normal rate.   Pulmonary:      Effort: Pulmonary effort is normal. No respiratory distress.   Abdominal:      General: Abdomen is flat.   Musculoskeletal:         General: No swelling.   Skin:     General: Skin is warm and dry.      Coloration: Skin is not jaundiced or pale.      Findings: No rash.   Neurological:      General: No focal deficit present.      Mental Status: She is alert and oriented to person, place, and time.      Gait: Gait normal.   Psychiatric:         Mood and Affect: Mood normal.         Behavior: Behavior normal.         Lab Results: I have reviewed the following results: CBC/BMP:   .     09/24/24  0447   WBC 6.40   HGB 9.6*   HCT 31.6*      SODIUM 135   K 3.8   CL 95*   CO2 33*   BUN 36*   CREATININE 1.13   GLUC 119      Imaging Review: No pertinent imaging studies reviewed.  Other Studies: No additional pertinent studies reviewed.    VTE Pharmacologic Prophylaxis: VTE covered by:  apixaban, Oral, 5 mg at 09/24/24 0821

## 2024-09-24 NOTE — ASSESSMENT & PLAN NOTE
Patient with history of kidney stone, history of lung transplant, currently on immunosuppressant, presented to [facility] with left flank pain since 1 day, CT renal stone study - Left renal pelvic calculus measuring 1.5 x 0.8 x 1.2 cm. Mild fullness of the lower pole calyx. Transferred to [facility] for urology intervention  S/p cystoscopy, ureteroscopy with lithotripsy holmium laser, retrograde pyelogram and insertion of left ureteral stent and basket stone extraction 9/20/24  Urology plans repeat CT in 2 weeks.  If minimal stone burden then stent will be removed but if large stone burden persistent then plan is for second stage ureteroscopy  9/23 Repeated CT abd/pelvis due to fever, ongoing abdominal pain: Interval lithotripsy of previously noted large calculus in the left renal pelvis, now with multiple small stone fragments measuring up to 4 mm noted in the interpolar and lower pole calyces. A left ureteral stent is seen in place, in good position. No hydronephrosis.  Continues to have flank pain and dysuria.  Continue Flomax/ oxybutynin/Pyridium. Discussed with urology.   Pain management per [facility]PS

## 2024-09-24 NOTE — ASSESSMENT & PLAN NOTE
H/o cystic fibrosis s/p bilateral lung transplant in 2013 and left lung transplant on 3/1/40183, S/p right pneumonectomy  home regimen - Prednisone 15 mg daily(being tapered down to 10 mg), Tacrolimus 3.5 mg in the morning and 3 mg in the evening,   Continue atovaquone 1500 mg daily, Azithromycin 250 mg 3 times weekly on M/W/F, Valacyclovir 500 mg daily  Tacrolimus level originally 17+, discussed with [place] [facility], tacrolimus was held.  Was restarted 9/22 at 2 mg twice daily with repeat level 9/23 of 7.3.  Goal trough level 6-8. # [phone]  Patient's original transplant specialist is Dr. Chavez Kurtz, they can be reached at # [phone] fur any questions/further needs  Close outpatient f/u

## 2024-09-24 NOTE — ASSESSMENT & PLAN NOTE
Lab Results   Component Value Date    EGFR 58 09/24/2024    EGFR 46 09/23/2024    EGFR 46 09/22/2024    CREATININE 1.13 09/24/2024    CREATININE 1.37 (H) 09/23/2024    CREATININE 1.36 (H) 09/22/2024   Estimated Creatinine Clearance: 49.2 mL/min (by C-G formula based on SCr of 1.13 mg/dL).  Will attempt to minimize nephrotoxic pain medications.

## 2024-09-24 NOTE — PROGRESS NOTES
Progress Note - Hospitalist   Name: Maximilian Mann 48 y.o. female I MRN: [id number]  [facility] Date of Admission: 9/20/2024   Date of Service: 9/24/2024 I Hospital Day: 4     Assessment & Plan  Renal calculus, left  Patient with history of kidney stone, history of lung transplant, currently on immunosuppressant, presented to [facility] with left flank pain since 1 day, CT renal stone study - Left renal pelvic calculus measuring 1.5 x 0.8 x 1.2 cm. Mild fullness of the lower pole calyx. Transferred to [facility] for urology intervention  S/p cystoscopy, ureteroscopy with lithotripsy holmium laser, retrograde pyelogram and insertion of left ureteral stent and basket stone extraction 9/20/24  Urology plans repeat CT in 2 weeks.  If minimal stone burden then stent will be removed but if large stone burden persistent then plan is for second stage ureteroscopy  9/23 Repeated CT abd/pelvis due to fever, ongoing abdominal pain: Interval lithotripsy of previously noted large calculus in the left renal pelvis, now with multiple small stone fragments measuring up to 4 mm noted in the interpolar and lower pole calyces. A left ureteral stent is seen in place, in good position. No hydronephrosis.  Continues to have flank pain and dysuria.  Continue Flomax/ oxybutynin/Pyridium. Discussed with urology.   Pain management per APS  Fever  Patient had fever of 102 on 9/22/2024, afebrile since   Possibly secondary to viral infection has had sore throat with negative strep workup.  Repeat CT abdomen pelvis shows multiple small stone fragments due to recent lithotripsy, however no hydronephrosis noted.  Patient is immunocompromised therefore was started on empiric IV Rocephin, completed 3 doses.  Urine culture with mixed contaminants, will hold off on further antibiotics.  BC negative to date  Hx of lung transplant (HCC)  H/o cystic fibrosis s/p bilateral lung transplant in 2013 and left lung transplant on 3/1/14526,  S/p right pneumonectomy  home regimen - Prednisone 15 mg daily(being tapered down to 10 mg), Tacrolimus 3.5 mg in the morning and 3 mg in the evening,   Continue atovaquone 1500 mg daily, Azithromycin 250 mg 3 times weekly on M/W/F, Valacyclovir 500 mg daily  Tacrolimus level originally 17+, discussed with [place] [facility], tacrolimus was held.  Was restarted 9/22 at 2 mg twice daily with repeat level 9/23 of 7.3.  Goal trough level 6-8. # [phone]  Patient's original transplant specialist is Dr. Chavez Kurtz, they can be reached at # [phone] fur any questions/further needs  Close outpatient f/u    Type 1 diabetes mellitus with diabetic chronic kidney disease, unspecified CKD stage (HCC)  Lab Results   Component Value Date    HGBA1C 7.5 (H) 09/21/2024       Recent Labs     09/23/24  1652 09/23/24  2140 09/24/24  0721 09/24/24  1109   POCGLU 287* 229* 131 178*       Home regimen: Lantus 12 units in am, Aspart 6 units TID  Continue Lantus 12 units in am, Humalog at 2 units TID + SSI  Monitor blood sugars and adjust insulin accordingly    History of pulmonary embolus (PE)  Continue Eliquis, has hx of PE/DVT   Asthma  Continue Symbicort, prn Albuterol  Stage 3a chronic kidney disease (HCC)  Lab Results   Component Value Date    EGFR 58 09/24/2024    EGFR 46 09/23/2024    EGFR 46 09/22/2024    CREATININE 1.13 09/24/2024    CREATININE 1.37 (H) 09/23/2024    CREATININE 1.36 (H) 09/22/2024   Baseline creatinine 1 to 1.2, currently stable.  Diuretics held can likely resume on discharge   Continuous opioid dependence (HCC)  Difficult to control pain  APS following, currently on ketamine gtt  PCP manages pain  Pancreatic insufficiency  Ct Pancrealipase  Sore throat  Sore throat since 1 day  Patient was concerned for strep throat  Negative covid/flu/rsv  Strep A PCR - not detected  Symptomatic treatment      VTE Pharmacologic Prophylaxis: VTE Score: 6 High Risk (Score >/= 5) - Pharmacological DVT Prophylaxis  Ordered: apixaban (Eliquis). Sequential Compression Devices Ordered.    Mobility:   Basic Mobility Inpatient Raw Score: 24  JH-HLM Goal: 8: Walk 250 feet or more  JH-HLM Achieved: 7: Walk 25 feet or more  JH-HLM Goal NOT achieved. Continue with multidisciplinary rounding and encourage appropriate mobility to improve upon JH-HLM goals.    Patient Centered Rounds: I performed bedside rounds with nursing staff today.   Discussions with Specialists or Other Care Team Provider: nursing, case management, APS, urology     Education and Discussions with Family / Patient: Patient declined call to .     Current Length of Stay: 4 day(s)  Current Patient Status: Inpatient   Certification Statement: The patient will continue to require additional inpatient hospital stay due to monitor off abx, symptom management   Discharge Plan: Anticipate discharge tomorrow to home.    Code Status: Level 1 - Full Code    Subjective   No new complaints. Continues to have flank pain and significant pain with urination despite pyridium/oxybutynin/flomax.     Objective     Vitals:   Temp (24hrs), Av.8 °F (37.1 °C), Min:97.9 °F (36.6 °C), Max:99.8 °F (37.7 °C)    Temp:  [97.9 °F (36.6 °C)-99.8 °F (37.7 °C)] 98.3 °F (36.8 °C)  HR:  [74-87] 76  Resp:  [14-18] 18  BP: (116-149)/(68-89) 142/89  SpO2:  [90 %-98 %] 96 %  Body mass index is 22.82 kg/m².     Input and Output Summary (last 24 hours):     Intake/Output Summary (Last 24 hours) at 2024 1235  Last data filed at 2024 1054  Gross per 24 hour   Intake 1320 ml   Output 2100 ml   Net -780 ml       Physical Exam  Vitals and nursing note reviewed.   Cardiovascular:      Rate and Rhythm: Normal rate.   Pulmonary:      Breath sounds: Normal breath sounds.   Abdominal:      Tenderness: There is no abdominal tenderness.   Musculoskeletal:         General: No swelling.   Skin:     General: Skin is warm.   Neurological:      Mental Status: She is alert and oriented to person,  place, and time. Mental status is at baseline.   Psychiatric:         Mood and Affect: Mood normal.          Lines/Drains:  Lines/Drains/Airways       Active Status       Name Placement date Placement time Site Days    Port A Cath 11/25/22 Right Chest 11/25/22  0953  Chest  669    Port A Cath 11/25/22 Right Chest 11/25/22  1201  Chest  668                    Central Line:  Goal for removal:  port not currently accessed                Lab Results: I have reviewed the following results:    Results from last 7 days   Lab Units 09/24/24 0447 09/22/24 0449 09/21/24  0435   WBC Thousand/uL 6.40   < > 8.57   HEMOGLOBIN g/dL 9.6*   < > 11.9   HEMATOCRIT % 31.6*   < > 39.4   PLATELETS Thousands/uL 151   < > 147*   SEGS PCT %  --   --  76*   LYMPHO PCT %  --   --  15   MONO PCT %  --   --  9   EOS PCT %  --   --  0    < > = values in this interval not displayed.     Results from last 7 days   Lab Units 09/24/24 0447 09/21/24 0435 09/20/24  0748   SODIUM mmol/L 135   < > 136   POTASSIUM mmol/L 3.8   < > 4.3   CHLORIDE mmol/L 95*   < > 97   CO2 mmol/L 33*   < > 33*   BUN mg/dL 36*   < > 31*   CREATININE mg/dL 1.13   < > 1.21   ANION GAP mmol/L 7   < > 6   CALCIUM mg/dL 8.4   < > 8.9   ALBUMIN g/dL  --   --  3.8   TOTAL BILIRUBIN mg/dL  --   --  0.62   ALK PHOS U/L  --   --  74   ALT U/L  --   --  18   AST U/L  --   --  11*   GLUCOSE RANDOM mg/dL 119   < > 144*    < > = values in this interval not displayed.         Results from last 7 days   Lab Units 09/24/24  1109 09/24/24  0721 09/23/24  2140 09/23/24  1652 09/23/24  1129 09/23/24  0810 09/22/24  2104 09/22/24  1612 09/22/24  1106 09/22/24  0808 09/21/24  2039 09/21/24  1707   POC GLUCOSE mg/dl 178* 131 229* 287* 186* 181* 196* 358* 136 137 187* 317*     Results from last 7 days   Lab Units 09/21/24  0435   HEMOGLOBIN A1C % 7.5*     Results from last 7 days   Lab Units 09/22/24  0927 09/20/24  0748   LACTIC ACID mmol/L 1.2 0.9       Recent Cultures (last 7 days):    Results from last 7 days   Lab Units 09/22/24  1338 09/22/24  0927 09/22/24  0926 09/20/24  1452   BLOOD CULTURE   --  No Growth at 24 hrs. No Growth at 24 hrs.  --    URINE CULTURE  40,000-49,000 cfu/ml  --   --  60,000-69,000 cfu/ml Candida albicans*  50,000-59,000 cfu/ml Candida glabrata*       Imaging Review: No pertinent imaging studies reviewed.  Other Studies: No additional pertinent studies reviewed.    Last 24 Hours Medication List:     Current Facility-Administered Medications:     acetaminophen (TYLENOL) tablet 975 mg, Q6H PRN    albuterol (PROVENTIL HFA,VENTOLIN HFA) inhaler 2 puff, Q4H PRN    ALPRAZolam (XANAX) tablet 1 mg, HS PRN    apixaban (ELIQUIS) tablet 5 mg, BID    ascorbic acid (VITAMIN C) tablet 125 mg, Daily    atovaquone (MEPRON) oral suspension 1,500 mg, Daily    azithromycin (ZITHROMAX) tablet 250 mg, Once per day on Monday Wednesday Friday    budesonide-formoterol (SYMBICORT) 160-4.5 mcg/act inhaler 2 puff, BID    Cholecalciferol (VITAMIN D3) tablet 5,000 Units, Daily    cyanocobalamin (VITAMIN B-12) tablet 1,000 mcg, Daily    dextromethorphan-guaiFENesin (ROBITUSSIN DM) oral syrup 5 mL, Q6H PRN    famotidine (PEPCID) tablet 40 mg, HS    glycopyrrolate (ROBINUL) injection 0.2 mg, Q4H PRN    haloperidol lactate (HALDOL) injection 2 mg, Q30 Min PRN    HYDROmorphone (DILAUDID) injection 1 mg, Q4H PRN    insulin glargine (LANTUS) subcutaneous injection 12 Units 0.12 mL, QAM    insulin lispro (HumALOG/ADMELOG) 100 units/mL subcutaneous injection 1-5 Units, TID AC **AND** Fingerstick Glucose (POCT), TID AC    insulin lispro (HumALOG/ADMELOG) 100 units/mL subcutaneous injection 1-5 Units, HS    insulin lispro (HumALOG/ADMELOG) 100 units/mL subcutaneous injection 2 Units, TID With Meals    ketamine 250 mg (STANDARD CONCENTRATION) IV in sodium chloride 0.9% 250 mL, Continuous, Last Rate: 0.1 mg/kg/hr (09/23/24 3879)    levalbuterol (XOPENEX) inhalation solution 1.25 mg, BID    LORazepam (ATIVAN)  injection 1 mg, Q1H PRN    magnesium Oxide (MAG-OX) tablet 400 mg, Daily    naloxone (NARCAN) 0.04 mg/mL syringe 0.04 mg, Q1MIN PRN    ondansetron (ZOFRAN-ODT) dispersible tablet 4 mg, Q6H PRN    oxybutynin (DITROPAN) tablet 5 mg, TID    oxyCODONE (ROXICODONE) IR tablet 5 mg, Q4H PRN **OR** oxyCODONE (ROXICODONE) immediate release tablet 10 mg, Q4H PRN    pancrelipase (Lip-Prot-Amyl) (CREON) delayed release capsule 48,000 Units, TID With Meals    pantoprazole (PROTONIX) EC tablet 40 mg, Early Morning    phenazopyridine (PYRIDIUM) tablet 100 mg, TID With Meals    predniSONE tablet 15 mg, Daily    pregabalin (LYRICA) capsule 150 mg, BID    sodium chloride 3 % inhalation solution 4 mL, BID    tacrolimus (PROGRAF) capsule 2 mg, Q12H DOTTY    tamsulosin (FLOMAX) capsule 0.4 mg, Daily With Dinner    valACYclovir (VALTREX) tablet 500 mg, Daily    Administrative Statements   Today, Patient Was Seen By: WAYNE Greenwood      **Please Note: This note may have been constructed using a voice recognition system.**

## 2024-09-25 LAB
ANION GAP SERPL CALCULATED.3IONS-SCNC: 8 MMOL/L (ref 4–13)
BASOPHILS # BLD AUTO: 0.01 THOUSANDS/ΜL (ref 0–0.1)
BASOPHILS NFR BLD AUTO: 0 % (ref 0–1)
BUN SERPL-MCNC: 29 MG/DL (ref 5–25)
CALCIUM SERPL-MCNC: 8.7 MG/DL (ref 8.4–10.2)
CHLORIDE SERPL-SCNC: 96 MMOL/L (ref 96–108)
CO2 SERPL-SCNC: 33 MMOL/L (ref 21–32)
CREAT SERPL-MCNC: 1.11 MG/DL (ref 0.6–1.3)
EOSINOPHIL # BLD AUTO: 0.04 THOUSAND/ΜL (ref 0–0.61)
EOSINOPHIL NFR BLD AUTO: 1 % (ref 0–6)
ERYTHROCYTE [DISTWIDTH] IN BLOOD BY AUTOMATED COUNT: 14.1 % (ref 11.6–15.1)
GFR SERPL CREATININE-BSD FRML MDRD: 59 ML/MIN/1.73SQ M
GLUCOSE SERPL-MCNC: 115 MG/DL (ref 65–140)
GLUCOSE SERPL-MCNC: 124 MG/DL (ref 65–140)
GLUCOSE SERPL-MCNC: 157 MG/DL (ref 65–140)
GLUCOSE SERPL-MCNC: 171 MG/DL (ref 65–140)
GLUCOSE SERPL-MCNC: 205 MG/DL (ref 65–140)
HCT VFR BLD AUTO: 32.2 % (ref 34.8–46.1)
HGB BLD-MCNC: 9.9 G/DL (ref 11.5–15.4)
IMM GRANULOCYTES # BLD AUTO: 0.04 THOUSAND/UL (ref 0–0.2)
IMM GRANULOCYTES NFR BLD AUTO: 1 % (ref 0–2)
LYMPHOCYTES # BLD AUTO: 1.14 THOUSANDS/ΜL (ref 0.6–4.47)
LYMPHOCYTES NFR BLD AUTO: 18 % (ref 14–44)
MCH RBC QN AUTO: 29.2 PG (ref 26.8–34.3)
MCHC RBC AUTO-ENTMCNC: 30.7 G/DL (ref 31.4–37.4)
MCV RBC AUTO: 95 FL (ref 82–98)
MONOCYTES # BLD AUTO: 0.62 THOUSAND/ΜL (ref 0.17–1.22)
MONOCYTES NFR BLD AUTO: 10 % (ref 4–12)
NEUTROPHILS # BLD AUTO: 4.44 THOUSANDS/ΜL (ref 1.85–7.62)
NEUTS SEG NFR BLD AUTO: 70 % (ref 43–75)
NRBC BLD AUTO-RTO: 0 /100 WBCS
PLATELET # BLD AUTO: 158 THOUSANDS/UL (ref 149–390)
PMV BLD AUTO: 10.7 FL (ref 8.9–12.7)
POTASSIUM SERPL-SCNC: 4 MMOL/L (ref 3.5–5.3)
RBC # BLD AUTO: 3.39 MILLION/UL (ref 3.81–5.12)
SODIUM SERPL-SCNC: 137 MMOL/L (ref 135–147)
WBC # BLD AUTO: 6.29 THOUSAND/UL (ref 4.31–10.16)

## 2024-09-25 PROCEDURE — 80048 BASIC METABOLIC PNL TOTAL CA: CPT | Performed by: NURSE PRACTITIONER

## 2024-09-25 PROCEDURE — 99233 SBSQ HOSP IP/OBS HIGH 50: CPT | Performed by: STUDENT IN AN ORGANIZED HEALTH CARE EDUCATION/TRAINING PROGRAM

## 2024-09-25 PROCEDURE — 94760 N-INVAS EAR/PLS OXIMETRY 1: CPT

## 2024-09-25 PROCEDURE — 99232 SBSQ HOSP IP/OBS MODERATE 35: CPT | Performed by: NURSE PRACTITIONER

## 2024-09-25 PROCEDURE — 85025 COMPLETE CBC W/AUTO DIFF WBC: CPT | Performed by: NURSE PRACTITIONER

## 2024-09-25 PROCEDURE — 87040 BLOOD CULTURE FOR BACTERIA: CPT | Performed by: NURSE PRACTITIONER

## 2024-09-25 PROCEDURE — 82948 REAGENT STRIP/BLOOD GLUCOSE: CPT

## 2024-09-25 PROCEDURE — 94640 AIRWAY INHALATION TREATMENT: CPT

## 2024-09-25 PROCEDURE — 94664 DEMO&/EVAL PT USE INHALER: CPT

## 2024-09-25 RX ORDER — HYDROMORPHONE HCL/PF 1 MG/ML
0.5 SYRINGE (ML) INJECTION ONCE
Status: COMPLETED | OUTPATIENT
Start: 2024-09-25 | End: 2024-09-25

## 2024-09-25 RX ORDER — HYDROMORPHONE HCL/PF 1 MG/ML
1 SYRINGE (ML) INJECTION EVERY 4 HOURS PRN
Status: DISCONTINUED | OUTPATIENT
Start: 2024-09-25 | End: 2024-09-25

## 2024-09-25 RX ORDER — HYDROMORPHONE HCL/PF 1 MG/ML
1 SYRINGE (ML) INJECTION EVERY 12 HOURS PRN
Status: DISCONTINUED | OUTPATIENT
Start: 2024-09-25 | End: 2024-09-25

## 2024-09-25 RX ORDER — HYDROMORPHONE HCL/PF 1 MG/ML
1 SYRINGE (ML) INJECTION EVERY 12 HOURS PRN
Status: DISCONTINUED | OUTPATIENT
Start: 2024-09-25 | End: 2024-09-26

## 2024-09-25 RX ORDER — INSULIN LISPRO 100 [IU]/ML
3 INJECTION, SOLUTION INTRAVENOUS; SUBCUTANEOUS
Status: DISCONTINUED | OUTPATIENT
Start: 2024-09-25 | End: 2024-09-26 | Stop reason: HOSPADM

## 2024-09-25 RX ADMIN — PANCRELIPASE 48000 UNITS: 120000; 24000; 76000 CAPSULE, DELAYED RELEASE PELLETS ORAL at 07:50

## 2024-09-25 RX ADMIN — ONDANSETRON 4 MG: 4 TABLET, ORALLY DISINTEGRATING ORAL at 06:04

## 2024-09-25 RX ADMIN — OXYCODONE HYDROCHLORIDE 10 MG: 10 TABLET ORAL at 10:20

## 2024-09-25 RX ADMIN — SODIUM CHLORIDE SOLN NEBU 3% 4 ML: 3 NEBU SOLN at 19:27

## 2024-09-25 RX ADMIN — TACROLIMUS 2 MG: 1 CAPSULE ORAL at 08:41

## 2024-09-25 RX ADMIN — BUDESONIDE AND FORMOTEROL FUMARATE DIHYDRATE 2 PUFF: 160; 4.5 AEROSOL RESPIRATORY (INHALATION) at 08:41

## 2024-09-25 RX ADMIN — INSULIN LISPRO 1 UNITS: 100 INJECTION, SOLUTION INTRAVENOUS; SUBCUTANEOUS at 08:52

## 2024-09-25 RX ADMIN — APIXABAN 5 MG: 5 TABLET, FILM COATED ORAL at 08:39

## 2024-09-25 RX ADMIN — PANCRELIPASE 48000 UNITS: 120000; 24000; 76000 CAPSULE, DELAYED RELEASE PELLETS ORAL at 12:02

## 2024-09-25 RX ADMIN — PREGABALIN 150 MG: 75 CAPSULE ORAL at 08:37

## 2024-09-25 RX ADMIN — ACETAMINOPHEN 975 MG: 325 TABLET ORAL at 08:32

## 2024-09-25 RX ADMIN — PANCRELIPASE 48000 UNITS: 120000; 24000; 76000 CAPSULE, DELAYED RELEASE PELLETS ORAL at 16:34

## 2024-09-25 RX ADMIN — Medication 125 MG: at 08:39

## 2024-09-25 RX ADMIN — PREDNISONE 15 MG: 5 TABLET ORAL at 08:37

## 2024-09-25 RX ADMIN — HYDROMORPHONE HYDROCHLORIDE 0.5 MG: 1 INJECTION, SOLUTION INTRAMUSCULAR; INTRAVENOUS; SUBCUTANEOUS at 23:38

## 2024-09-25 RX ADMIN — HYDROMORPHONE HYDROCHLORIDE 1 MG: 1 INJECTION, SOLUTION INTRAMUSCULAR; INTRAVENOUS; SUBCUTANEOUS at 12:04

## 2024-09-25 RX ADMIN — MAGNESIUM OXIDE TAB 400 MG (241.3 MG ELEMENTAL MG) 400 MG: 400 (241.3 MG) TAB at 08:39

## 2024-09-25 RX ADMIN — PANTOPRAZOLE SODIUM 40 MG: 40 TABLET, DELAYED RELEASE ORAL at 06:01

## 2024-09-25 RX ADMIN — ACETAMINOPHEN 975 MG: 325 TABLET ORAL at 16:41

## 2024-09-25 RX ADMIN — PREGABALIN 150 MG: 75 CAPSULE ORAL at 17:38

## 2024-09-25 RX ADMIN — APIXABAN 5 MG: 5 TABLET, FILM COATED ORAL at 17:38

## 2024-09-25 RX ADMIN — OXYBUTYNIN CHLORIDE 5 MG: 5 TABLET ORAL at 08:40

## 2024-09-25 RX ADMIN — BUDESONIDE AND FORMOTEROL FUMARATE DIHYDRATE 2 PUFF: 160; 4.5 AEROSOL RESPIRATORY (INHALATION) at 17:39

## 2024-09-25 RX ADMIN — CYANOCOBALAMIN TAB 500 MCG 1000 MCG: 500 TAB at 08:40

## 2024-09-25 RX ADMIN — Medication 5000 UNITS: at 08:38

## 2024-09-25 RX ADMIN — TACROLIMUS 2 MG: 1 CAPSULE ORAL at 21:44

## 2024-09-25 RX ADMIN — INSULIN GLARGINE 12 UNITS: 100 INJECTION, SOLUTION SUBCUTANEOUS at 08:52

## 2024-09-25 RX ADMIN — OXYCODONE HYDROCHLORIDE 10 MG: 10 TABLET ORAL at 06:07

## 2024-09-25 RX ADMIN — POLYETHYLENE GLYCOL 3350 17 G: 17 POWDER, FOR SOLUTION ORAL at 08:38

## 2024-09-25 RX ADMIN — SODIUM CHLORIDE SOLN NEBU 3% 4 ML: 3 NEBU SOLN at 07:54

## 2024-09-25 RX ADMIN — OXYBUTYNIN CHLORIDE 5 MG: 5 TABLET ORAL at 21:44

## 2024-09-25 RX ADMIN — VALACYCLOVIR HYDROCHLORIDE 500 MG: 500 TABLET, FILM COATED ORAL at 08:40

## 2024-09-25 RX ADMIN — TAMSULOSIN HYDROCHLORIDE 0.4 MG: 0.4 CAPSULE ORAL at 15:45

## 2024-09-25 RX ADMIN — INSULIN LISPRO 2 UNITS: 100 INJECTION, SOLUTION INTRAVENOUS; SUBCUTANEOUS at 08:52

## 2024-09-25 RX ADMIN — INSULIN LISPRO 3 UNITS: 100 INJECTION, SOLUTION INTRAVENOUS; SUBCUTANEOUS at 16:36

## 2024-09-25 RX ADMIN — AZITHROMYCIN DIHYDRATE 250 MG: 250 TABLET ORAL at 08:39

## 2024-09-25 RX ADMIN — ATOVAQUONE 1500 MG: 750 SUSPENSION ORAL at 08:37

## 2024-09-25 RX ADMIN — LEVALBUTEROL HYDROCHLORIDE 1.25 MG: 1.25 SOLUTION RESPIRATORY (INHALATION) at 07:54

## 2024-09-25 RX ADMIN — OXYBUTYNIN CHLORIDE 5 MG: 5 TABLET ORAL at 15:45

## 2024-09-25 RX ADMIN — INSULIN LISPRO 3 UNITS: 100 INJECTION, SOLUTION INTRAVENOUS; SUBCUTANEOUS at 12:03

## 2024-09-25 RX ADMIN — PHENAZOPYRIDINE HYDROCHLORIDE 100 MG: 100 TABLET ORAL at 16:34

## 2024-09-25 RX ADMIN — KETAMINE HYDROCHLORIDE 0.2 MG/KG/HR: 100 INJECTION INTRAMUSCULAR; INTRAVENOUS at 02:46

## 2024-09-25 RX ADMIN — OXYCODONE HYDROCHLORIDE 10 MG: 10 TABLET ORAL at 15:45

## 2024-09-25 RX ADMIN — INSULIN LISPRO 1 UNITS: 100 INJECTION, SOLUTION INTRAVENOUS; SUBCUTANEOUS at 16:34

## 2024-09-25 RX ADMIN — OXYCODONE HYDROCHLORIDE 10 MG: 10 TABLET ORAL at 20:22

## 2024-09-25 RX ADMIN — FAMOTIDINE 40 MG: 20 TABLET, FILM COATED ORAL at 21:44

## 2024-09-25 RX ADMIN — HYDROMORPHONE HYDROCHLORIDE 1 MG: 1 INJECTION, SOLUTION INTRAMUSCULAR; INTRAVENOUS; SUBCUTANEOUS at 02:50

## 2024-09-25 RX ADMIN — PHENAZOPYRIDINE HYDROCHLORIDE 100 MG: 100 TABLET ORAL at 07:50

## 2024-09-25 RX ADMIN — LEVALBUTEROL HYDROCHLORIDE 1.25 MG: 1.25 SOLUTION RESPIRATORY (INHALATION) at 19:27

## 2024-09-25 NOTE — ASSESSMENT & PLAN NOTE
Lab Results   Component Value Date    EGFR 59 09/25/2024    EGFR 58 09/24/2024    EGFR 46 09/23/2024    CREATININE 1.11 09/25/2024    CREATININE 1.13 09/24/2024    CREATININE 1.37 (H) 09/23/2024   Baseline creatinine 1 to 1.2, currently stable.  Diuretics held, can likely resume on discharge

## 2024-09-25 NOTE — ASSESSMENT & PLAN NOTE
Lab Results   Component Value Date    HGBA1C 7.5 (H) 09/21/2024       Recent Labs     09/24/24  1109 09/24/24  1708 09/24/24  2114 09/25/24  0755   POCGLU 178* 219* 233* 171*       Home regimen: Lantus 12 units in am, Aspart 6 units TID  Continue Lantus 12 units in am, Humalog at 3 units TID + SSI  Monitor blood sugars and adjust insulin accordingly

## 2024-09-25 NOTE — PROGRESS NOTES
Progress Note - Hospitalist   Name: Maximilian Mann 48 y.o. female I MRN: [id number]  [facility] Date of Admission: 9/20/2024   Date of Service: 9/25/2024 I Hospital Day: 5     Assessment & Plan  Renal calculus, left  Patient with history of kidney stone, history of lung transplant, currently on immunosuppressant, presented to [facility] with left flank pain since 1 day, CT renal stone study - Left renal pelvic calculus measuring 1.5 x 0.8 x 1.2 cm. Mild fullness of the lower pole calyx. Transferred to [facility] for urology intervention  S/p cystoscopy, ureteroscopy with lithotripsy holmium laser, retrograde pyelogram and insertion of left ureteral stent and basket stone extraction 9/20/24  Urology recommending repeat CT in 2 weeks, ~10/4.  If minimal stone burden then stent will be removed but if large stone burden persistent then plan is for second stage ureteroscopy/stone treatment.   9/23 Repeated CT abd/pelvis due to fever, ongoing abdominal pain: Interval lithotripsy of previously noted large calculus in the left renal pelvis, now with multiple small stone fragments measuring up to 4 mm noted in the interpolar and lower pole calyces. A left ureteral stent is seen in place, in good position. No hydronephrosis.  Continues to have flank pain and dysuria.  Continue Flomax/ oxybutynin/Pyridium. Discussed with urology. No further recommendations. Encouraged hydration.   Pain management per APS, IV ketamine discontinued 9/25.   Fever  Patient had fever of 102 on 9/22/2024, afebrile since then however now with another fever of 102 9/25.  Possibly secondary to viral infection has had sore throat with negative strep workup.  Recent repeat CT abdomen pelvis shows multiple small stone fragments due to recent lithotripsy, however no hydronephrosis noted.  Patient is immunocompromised therefore was started on empiric IV Rocephin, completed 3 doses.  Urine culture with mixed contaminants, will hold off  on further antibiotics.  BC negative to date, repeat sent 9/25  Would continue to monitor off abx. Patient is hemodynamically stable and non-toxic appearing. If fevers persist, would consult ID. Would start broad spectrum abx for any signs of hemodynamic instablity.   Hx of lung transplant (HCC)  H/o cystic fibrosis s/p bilateral lung transplant in 2013 and left lung transplant on 3/1/17817, S/p right pneumonectomy  home regimen - Prednisone 15 mg daily(being tapered down to 10 mg), Tacrolimus 3.5 mg in the morning and 3 mg in the evening,   Continue atovaquone 1500 mg daily, Azithromycin 250 mg 3 times weekly on M/W/F, Valacyclovir 500 mg daily  Tacrolimus level originally 17+, discussed with [place] [facility], tacrolimus was held.  Was restarted 9/22 at 2 mg twice daily with repeat level 9/23 of 7.3.  Goal trough level 6-8.   Patient's original transplant specialist is Dr. Chavez Kurtz, the transplant center can be reached at # [phone] for any questions/further needs  Close outpatient f/u, patient instructed to call [facility] on d/c to discuss need for timing of further tac level draws (usually does monthly)    Sore throat  Reported sore throat earlier this admission, patient was concerned about strep, Strep A negative.   Negative covid/flu/rsv  Now resolved     Type 1 diabetes mellitus with diabetic chronic kidney disease, unspecified CKD stage ([facility])  Lab Results   Component Value Date    HGBA1C 7.5 (H) 09/21/2024       Recent Labs     09/24/24  1109 09/24/24  1708 09/24/24  2114 09/25/24  0755   POCGLU 178* 219* 233* 171*       Home regimen: Lantus 12 units in am, Aspart 6 units TID  Continue Lantus 12 units in am, Humalog at 3 units TID + SSI  Monitor blood sugars and adjust insulin accordingly    History of pulmonary embolus (PE)  Continue Eliquis, has hx of PE/DVT   Asthma  Continue Symbicort, prn Albuterol  Stage 3a chronic kidney disease ([facility])  Lab Results   Component Value Date    EGFR 59 09/25/2024     EGFR 58 2024    EGFR 46 2024    CREATININE 1.11 2024    CREATININE 1.13 2024    CREATININE 1.37 (H) 2024   Baseline creatinine 1 to 1.2, currently stable.  Diuretics held, can likely resume on discharge   Continuous opioid dependence (HCC)  Difficult to control pain  APS following, currently on ketamine gtt  PCP manages pain as outpatient   Pancreatic insufficiency  Ct Pancrealipase    VTE Pharmacologic Prophylaxis: VTE Score: 6 Moderate Risk (Score 3-4) - Pharmacological DVT Prophylaxis Ordered: apixaban (Eliquis).    Mobility:   Basic Mobility Inpatient Raw Score: 24  JH-HLM Goal: 8: Walk 250 feet or more  JH-HLM Achieved: 7: Walk 25 feet or more  JH-HLM Goal NOT achieved. Continue with multidisciplinary rounding and encourage appropriate mobility to improve upon JH-HLM goals.    Patient Centered Rounds: I performed bedside rounds with nursing staff today.   Discussions with Specialists or Other Care Team Provider: nursing, case management     Education and Discussions with Family / Patient: Patient declined call to .     Current Length of Stay: 5 day(s)  Current Patient Status: Inpatient   Certification Statement: The patient will continue to require additional inpatient hospital stay due to monitor temps   Discharge Plan: Anticipate discharge tomorrow to home.    Code Status: Level 1 - Full Code    Subjective   No complaints. Flank pain improving. Burning/stent colic also better today.  Reports a fever this morning, no rigors/chills or sweats.  Was asymptomatic.  Overall feels well and ready for discharge but understands need for further monitoring.    Objective     Vitals:   Temp (24hrs), Av.4 °F (37.4 °C), Min:97.8 °F (36.6 °C), Max:102.2 °F (39 °C)    Temp:  [97.8 °F (36.6 °C)-102.2 °F (39 °C)] 100 °F (37.8 °C)  HR:  [73-98] 90  Resp:  [14-18] 18  BP: (137-165)/(79-94) 145/84  SpO2:  [91 %-98 %] 91 %  Body mass index is 22.82 kg/m².     Input and Output Summary  (last 24 hours):     Intake/Output Summary (Last 24 hours) at 9/25/2024 1027  Last data filed at 9/25/2024 0901  Gross per 24 hour   Intake --   Output 2900 ml   Net -2900 ml       Physical Exam  Vitals and nursing note reviewed.   Constitutional:       General: She is not in acute distress.  Cardiovascular:      Rate and Rhythm: Normal rate.   Pulmonary:      Breath sounds: Normal breath sounds.   Abdominal:      Tenderness: There is no abdominal tenderness.   Musculoskeletal:         General: No swelling.   Skin:     General: Skin is warm.   Neurological:      Mental Status: She is alert and oriented to person, place, and time. Mental status is at baseline.   Psychiatric:         Mood and Affect: Mood normal.          Lines/Drains:  Lines/Drains/Airways       Active Status       Name Placement date Placement time Site Days    Port A Cath 11/25/22 Right Chest 11/25/22  0953  Chest  669    Port A Cath 11/25/22 Right Chest 11/25/22  1201  Chest  669                    Central Line:  Goal for removal: Port accessed. Will de-access as appropriate.               Lab Results: I have reviewed the following results:    Results from last 7 days   Lab Units 09/25/24  0426   WBC Thousand/uL 6.29   HEMOGLOBIN g/dL 9.9*   HEMATOCRIT % 32.2*   PLATELETS Thousands/uL 158   SEGS PCT % 70   LYMPHO PCT % 18   MONO PCT % 10   EOS PCT % 1     Results from last 7 days   Lab Units 09/25/24  0426 09/21/24  0435 09/20/24  0748   SODIUM mmol/L 137   < > 136   POTASSIUM mmol/L 4.0   < > 4.3   CHLORIDE mmol/L 96   < > 97   CO2 mmol/L 33*   < > 33*   BUN mg/dL 29*   < > 31*   CREATININE mg/dL 1.11   < > 1.21   ANION GAP mmol/L 8   < > 6   CALCIUM mg/dL 8.7   < > 8.9   ALBUMIN g/dL  --   --  3.8   TOTAL BILIRUBIN mg/dL  --   --  0.62   ALK PHOS U/L  --   --  74   ALT U/L  --   --  18   AST U/L  --   --  11*   GLUCOSE RANDOM mg/dL 157*   < > 144*    < > = values in this interval not displayed.         Results from last 7 days   Lab Units  09/25/24  0755 09/24/24  2114 09/24/24  1708 09/24/24  1109 09/24/24  0721 09/23/24  2140 09/23/24  1652 09/23/24  1129 09/23/24  0810 09/22/24  2104 09/22/24  1612 09/22/24  1106   POC GLUCOSE mg/dl 171* 233* 219* 178* 131 229* 287* 186* 181* 196* 358* 136     Results from last 7 days   Lab Units 09/21/24  0435   HEMOGLOBIN A1C % 7.5*     Results from last 7 days   Lab Units 09/22/24  0927 09/20/24  0748   LACTIC ACID mmol/L 1.2 0.9       Recent Cultures (last 7 days):   Results from last 7 days   Lab Units 09/22/24  1338 09/22/24  0927 09/22/24  0926 09/20/24  1452   BLOOD CULTURE   --  No Growth at 48 hrs. No Growth at 48 hrs.  --    URINE CULTURE  40,000-49,000 cfu/ml  --   --  60,000-69,000 cfu/ml Candida albicans*  50,000-59,000 cfu/ml Candida glabrata*       Imaging Review: No pertinent imaging studies reviewed.  Other Studies: No additional pertinent studies reviewed.    Last 24 Hours Medication List:     Current Facility-Administered Medications:     acetaminophen (TYLENOL) tablet 975 mg, Q6H PRN    albuterol (PROVENTIL HFA,VENTOLIN HFA) inhaler 2 puff, Q4H PRN    ALPRAZolam (XANAX) tablet 1 mg, HS PRN    apixaban (ELIQUIS) tablet 5 mg, BID    ascorbic acid (VITAMIN C) tablet 125 mg, Daily    atovaquone (MEPRON) oral suspension 1,500 mg, Daily    azithromycin (ZITHROMAX) tablet 250 mg, Once per day on Monday Wednesday Friday    budesonide-formoterol (SYMBICORT) 160-4.5 mcg/act inhaler 2 puff, BID    Cholecalciferol (VITAMIN D3) tablet 5,000 Units, Daily    cyanocobalamin (VITAMIN B-12) tablet 1,000 mcg, Daily    dextromethorphan-guaiFENesin (ROBITUSSIN DM) oral syrup 5 mL, Q6H PRN    famotidine (PEPCID) tablet 40 mg, HS    HYDROmorphone (DILAUDID) injection 1 mg, Q4H PRN    insulin glargine (LANTUS) subcutaneous injection 12 Units 0.12 mL, QAM    insulin lispro (HumALOG/ADMELOG) 100 units/mL subcutaneous injection 1-5 Units, TID AC **AND** Fingerstick Glucose (POCT), TID AC    insulin lispro  (HumALOG/ADMELOG) 100 units/mL subcutaneous injection 1-5 Units, HS    insulin lispro (HumALOG/ADMELOG) 100 units/mL subcutaneous injection 3 Units, TID With Meals    levalbuterol (XOPENEX) inhalation solution 1.25 mg, BID    magnesium Oxide (MAG-OX) tablet 400 mg, Daily    naloxone (NARCAN) 0.04 mg/mL syringe 0.04 mg, Q1MIN PRN    ondansetron (ZOFRAN-ODT) dispersible tablet 4 mg, Q6H PRN    oxybutynin (DITROPAN) tablet 5 mg, TID    oxyCODONE (ROXICODONE) IR tablet 5 mg, Q4H PRN **OR** oxyCODONE (ROXICODONE) immediate release tablet 10 mg, Q4H PRN    pancrelipase (Lip-Prot-Amyl) (CREON) delayed release capsule 48,000 Units, TID With Meals    pantoprazole (PROTONIX) EC tablet 40 mg, Early Morning    phenazopyridine (PYRIDIUM) tablet 100 mg, TID With Meals    polyethylene glycol (MIRALAX) packet 17 g, Daily    predniSONE tablet 15 mg, Daily    pregabalin (LYRICA) capsule 150 mg, BID    sodium chloride 3 % inhalation solution 4 mL, BID    tacrolimus (PROGRAF) capsule 2 mg, Q12H DOTTY    tamsulosin (FLOMAX) capsule 0.4 mg, Daily With Dinner    valACYclovir (VALTREX) tablet 500 mg, Daily    Administrative Statements   Today, Patient Was Seen By: WAYNE Greenwood      **Please Note: This note may have been constructed using a voice recognition system.**

## 2024-09-25 NOTE — ASSESSMENT & PLAN NOTE
Lab Results   Component Value Date    EGFR 59 09/25/2024    EGFR 58 09/24/2024    EGFR 46 09/23/2024    CREATININE 1.11 09/25/2024    CREATININE 1.13 09/24/2024    CREATININE 1.37 (H) 09/23/2024   Estimated Creatinine Clearance: 50.1 mL/min (by C-G formula based on SCr of 1.11 mg/dL).  Will attempt to minimize nephrotoxic pain medications.

## 2024-09-25 NOTE — ASSESSMENT & PLAN NOTE
Difficult to control pain  APS following, currently on ketamine gtt  PCP manages pain as outpatient

## 2024-09-25 NOTE — ASSESSMENT & PLAN NOTE
Patient had fever of 102 on 9/22/2024, afebrile since then however now with another fever of 102 9/25.  Possibly secondary to viral infection has had sore throat with negative strep workup.  Recent repeat CT abdomen pelvis shows multiple small stone fragments due to recent lithotripsy, however no hydronephrosis noted.  Patient is immunocompromised therefore was started on empiric IV Rocephin, completed 3 doses.  Urine culture with mixed contaminants, will hold off on further antibiotics.  BC negative to date, repeat sent 9/25  Would continue to monitor off abx. Patient is hemodynamically stable and non-toxic appearing. If fevers persist, would consult ID. Would start broad spectrum abx for any signs of hemodynamic instablity.

## 2024-09-25 NOTE — ASSESSMENT & PLAN NOTE
Reported sore throat earlier this admission, patient was concerned about strep, Strep A negative.   Negative covid/flu/rsv  Now resolved

## 2024-09-25 NOTE — PROGRESS NOTES
Progress Note - Acute Pain   Name: Maximilian Mann 48 y.o. female I MRN: [id number]  Unit/Bed#: [facility] 934-01 I Date of Admission: 9/20/2024   Date of Service: 9/25/2024 I Hospital Day: 5    Assessment & Plan  Renal calculus, left  Lyrica 150 mg p.o. twice daily.  Tylenol 975 mg p.o. every 6 hours as needed mild pain.  Oxycodone 5 mg p.o. every 4 hours as needed moderate pain or 10 mg p.o. every 4 hours as needed severe pain.  Dilaudid 1 mg IV every 4 hours as needed breakthrough pain.  Decrease to 1mg q12hrs in anticipation of discharging 9/26  Increased Ketamine infusion to 0.2 mg/kg/h IV continuous.  D/C Ketamine today  Continue heating pad as needed.  Add as needed Narcan in the event that opioid reversal is necessary.  Bowel regimen to avoid opioid-induced constipation while on opioid pain medication.  Stage 3a chronic kidney disease (HCC)  Lab Results   Component Value Date    EGFR 59 09/25/2024    EGFR 58 09/24/2024    EGFR 46 09/23/2024    CREATININE 1.11 09/25/2024    CREATININE 1.13 09/24/2024    CREATININE 1.37 (H) 09/23/2024   Estimated Creatinine Clearance: 50.1 mL/min (by C-G formula based on SCr of 1.11 mg/dL).  Will attempt to minimize nephrotoxic pain medications.  Hx of lung transplant (HCC)  Will avoid NSAIDs.  Continuous opioid dependence (HCC)  Patient is prescribed oxycodone 10 mg, 180 tablets for 30 days.  Patient gets this prescription once a month.  Patient fills this prescription monthly.  Patient states she takes only 1 or 2 tablets of this per month.  Type 1 diabetes mellitus with diabetic chronic kidney disease, unspecified CKD stage (HCC)  Lab Results   Component Value Date    HGBA1C 7.5 (H) 09/21/2024       Recent Labs     09/24/24  1708 09/24/24  2114 09/25/24  0755 09/25/24  1120   POCGLU 219* 233* 171* 115       Blood Sugar Average: Last 72 hrs:  (P) [id number]      APS will continue to follow. Please contact Acute Pain Service - via SecureChat from [phone] with  additional questions or concerns. See Cecil or Karis for additional contacts and after hours information.     History of Present Illness   Maximilian Mann is a 48 y.o. female who presents with left flank pain beginning 9/20/2024. Patient was seen in the emergency department and transferred to [place] for further management. She underwent cystoscopy, ureteroscopy, holmium laser lithotripsy and insertion of left ureteral stent with basket stone extraction on 9/20/2024.     Pt seen and examined today, sitting up in bed eating breakfast.  Frustrated as patient requires blood cultures and is a tough stick.  Overall pain improved since surgery, hopeful to leave today however ongoing medical issues at this time.  Ketamine without issue, plan to shut off today.  No cp/sob/n/v    Pain History  Current pain location(s):  Pain Score: 7  Pain Location/Orientation: Orientation: Left, Orientation: Lower, Location: Back  Pain Scale: Pain Assessment Tool: 0-10  24 hour history: as above    Opioid requirement previous 24 hours: 5mg IV Dilaudid, 50mg oxycodone  Meds/Allergies   all current active meds have been reviewed  Allergies   Allergen Reactions    Bebtelovimab Anaphylaxis    Vancomycin Angioedema and Hives    Gabapentin Hallucinations    Ceftazidime Headache     Severe headaches after desensitization    Nsaids Other (See Comments)     Lung transplant increases risk of renal toxicity, call lung txp provider to discuss if needed       Objective      Temp:  [97.8 °F (36.6 °C)-102.2 °F (39 °C)] 100 °F (37.8 °C)  HR:  [73-98] 90  Resp:  [14-18] 18  BP: (137-165)/(79-94) 145/84  Physical Exam  Vitals and nursing note reviewed.   Constitutional:       General: She is not in acute distress.     Appearance: Normal appearance.   HENT:      Head: Normocephalic and atraumatic.      Mouth/Throat:      Mouth: Mucous membranes are moist.   Eyes:      Extraocular Movements: Extraocular movements intact.   Cardiovascular:       Rate and Rhythm: Normal rate.   Pulmonary:      Effort: Pulmonary effort is normal.   Abdominal:      General: There is no distension.      Palpations: Abdomen is soft.      Tenderness: There is abdominal tenderness.   Musculoskeletal:         General: No swelling.   Skin:     General: Skin is warm.   Neurological:      Mental Status: She is alert and oriented to person, place, and time.   Psychiatric:         Mood and Affect: Mood normal.         Behavior: Behavior normal.            Lab Results: I have reviewed the following results:   Estimated Creatinine Clearance: 50.1 mL/min (by C-G formula based on SCr of 1.11 mg/dL).  Lab Results   Component Value Date    WBC 6.29 09/25/2024    HGB 9.9 (L) 09/25/2024    HCT 32.2 (L) 09/25/2024     09/25/2024         Component Value Date/Time    K 4.0 09/25/2024 0426    K 4.7 08/18/2024 0535    CL 96 09/25/2024 0426    CL 99 (L) 08/18/2024 0535    CO2 33 (H) 09/25/2024 0426    CO2 25 08/18/2024 0535    BUN 29 (H) 09/25/2024 0426    BUN 33 (H) 08/18/2024 0535    BUN 31 (H) 01/12/2024 0945    CREATININE 1.11 09/25/2024 0426    CREATININE 1.15 (H) 08/18/2024 0535         Component Value Date/Time    CALCIUM 8.7 09/25/2024 0426    CALCIUM 8.2 (L) 08/18/2024 0535    ALKPHOS 74 09/20/2024 0748    ALKPHOS 67 05/30/2024 0447    AST 11 (L) 09/20/2024 0748    AST 12 (L) 05/30/2024 0447    AST 11 01/12/2024 0945    ALT 18 09/20/2024 0748    ALT 9 (L) 05/30/2024 0447    ALT 11 01/12/2024 0945    TP 7.0 09/20/2024 0748    TP 6.4 05/30/2024 0447    ALB 3.8 09/20/2024 0748    ALB 3.2 (L) 05/30/2024 0447    ALB 3.9 01/12/2024 0945     Imaging Review: No pertinent imaging studies reviewed.  Other Studies: No additional pertinent studies reviewed.     Administrative Statements   I have spent a total time of 20   minutes in caring for this patient on the day of the visit/encounter including Risks and benefits of tx options, Instructions for management, Patient and family education,  Importance of tx compliance, Risk factor reductions, Impressions, Counseling / Coordination of care, Documenting in the medical record, Reviewing / ordering tests, medicine, procedures  , Obtaining or reviewing history  , and Communicating with other healthcare professionals .

## 2024-09-25 NOTE — PLAN OF CARE
Problem: PAIN - ADULT  Goal: Verbalizes/displays adequate comfort level or baseline comfort level  Description: Interventions:  - Encourage patient to monitor pain and request assistance  - Assess pain using appropriate pain scale  - Administer analgesics based on type and severity of pain and evaluate response  - Implement non-pharmacological measures as appropriate and evaluate response  - Consider cultural and social influences on pain and pain management  - Notify physician/advanced practitioner if interventions unsuccessful or patient reports new pain  Outcome: Progressing     Problem: INFECTION - ADULT  Goal: Absence or prevention of progression during hospitalization  Description: INTERVENTIONS:  - Assess and monitor for signs and symptoms of infection  - Monitor lab/diagnostic results  - Monitor all insertion sites, i.e. indwelling lines, tubes, and drains  - Monitor endotracheal if appropriate and nasal secretions for changes in amount and color  - [place] appropriate cooling/warming therapies per order  - Administer medications as ordered  - Instruct and encourage patient and family to use good hand hygiene technique  - Identify and instruct in appropriate isolation precautions for identified infection/condition  Outcome: Progressing     Problem: SAFETY ADULT  Goal: Maintain or return to baseline ADL function  Description: INTERVENTIONS:  -  Assess patient's ability to carry out ADLs; assess patient's baseline for ADL function and identify physical deficits which impact ability to perform ADLs (bathing, care of mouth/teeth, toileting, grooming, dressing, etc.)  - Assess/evaluate cause of self-care deficits   - Assess range of motion  - Assess patient's mobility; develop plan if impaired  - Assess patient's need for assistive devices and provide as appropriate  - Encourage maximum independence but intervene and supervise when necessary  - Involve family in performance of ADLs  - Assess for home care  needs following discharge   - Consider OT consult to assist with ADL evaluation and planning for discharge  - Provide patient education as appropriate  Outcome: Progressing     Problem: Knowledge Deficit  Goal: Patient/family/caregiver demonstrates understanding of disease process, treatment plan, medications, and discharge instructions  Description: Complete learning assessment and assess knowledge base.  Interventions:  - Provide teaching at level of understanding  - Provide teaching via preferred learning methods  Outcome: Progressing

## 2024-09-25 NOTE — ASSESSMENT & PLAN NOTE
Lyrica 150 mg p.o. twice daily.  Tylenol 975 mg p.o. every 6 hours as needed mild pain.  Oxycodone 5 mg p.o. every 4 hours as needed moderate pain or 10 mg p.o. every 4 hours as needed severe pain.  Dilaudid 1 mg IV every 4 hours as needed breakthrough pain.  Decrease to 1mg q12hrs in anticipation of discharging 9/26  Increased Ketamine infusion to 0.2 mg/kg/h IV continuous.  D/C Ketamine today  Continue heating pad as needed.  Add as needed Narcan in the event that opioid reversal is necessary.  Bowel regimen to avoid opioid-induced constipation while on opioid pain medication.

## 2024-09-25 NOTE — ASSESSMENT & PLAN NOTE
Lab Results   Component Value Date    HGBA1C 7.5 (H) 09/21/2024       Recent Labs     09/24/24  1708 09/24/24  2114 09/25/24  0755 09/25/24  1120   POCGLU 219* 233* 171* 115       Blood Sugar Average: Last 72 hrs:  (P) [id number]

## 2024-09-25 NOTE — ASSESSMENT & PLAN NOTE
H/o cystic fibrosis s/p bilateral lung transplant in 2013 and left lung transplant on 3/1/95623, S/p right pneumonectomy  home regimen - Prednisone 15 mg daily(being tapered down to 10 mg), Tacrolimus 3.5 mg in the morning and 3 mg in the evening,   Continue atovaquone 1500 mg daily, Azithromycin 250 mg 3 times weekly on M/W/F, Valacyclovir 500 mg daily  Tacrolimus level originally 17+, discussed with [facility], tacrolimus was held.  Was restarted 9/22 at 2 mg twice daily with repeat level 9/23 of 7.3.  Goal trough level 6-8.   Patient's original transplant specialist is Dr. Chavez Kurtz, the transplant center can be reached at # [phone] for any questions/further needs  Close outpatient f/u, patient instructed to call [facility] on d/c to discuss need for timing of further tac level draws (usually does monthly)

## 2024-09-26 VITALS
DIASTOLIC BLOOD PRESSURE: 67 MMHG | BODY MASS INDEX: 22.96 KG/M2 | SYSTOLIC BLOOD PRESSURE: 112 MMHG | WEIGHT: 124.78 LBS | HEIGHT: 62 IN | RESPIRATION RATE: 14 BRPM | OXYGEN SATURATION: 94 % | HEART RATE: 76 BPM | TEMPERATURE: 98.4 F

## 2024-09-26 LAB
ANION GAP SERPL CALCULATED.3IONS-SCNC: 4 MMOL/L (ref 4–13)
BASOPHILS # BLD AUTO: 0.01 THOUSANDS/ΜL (ref 0–0.1)
BASOPHILS NFR BLD AUTO: 0 % (ref 0–1)
BUN SERPL-MCNC: 40 MG/DL (ref 5–25)
CALCIUM SERPL-MCNC: 8.2 MG/DL (ref 8.4–10.2)
CHLORIDE SERPL-SCNC: 100 MMOL/L (ref 96–108)
CO2 SERPL-SCNC: 31 MMOL/L (ref 21–32)
CREAT SERPL-MCNC: 1.11 MG/DL (ref 0.6–1.3)
EOSINOPHIL # BLD AUTO: 0.05 THOUSAND/ΜL (ref 0–0.61)
EOSINOPHIL NFR BLD AUTO: 1 % (ref 0–6)
ERYTHROCYTE [DISTWIDTH] IN BLOOD BY AUTOMATED COUNT: 14.4 % (ref 11.6–15.1)
GFR SERPL CREATININE-BSD FRML MDRD: 59 ML/MIN/1.73SQ M
GLUCOSE SERPL-MCNC: 110 MG/DL (ref 65–140)
GLUCOSE SERPL-MCNC: 115 MG/DL (ref 65–140)
GLUCOSE SERPL-MCNC: 126 MG/DL (ref 65–140)
HCT VFR BLD AUTO: 29.6 % (ref 34.8–46.1)
HGB BLD-MCNC: 8.9 G/DL (ref 11.5–15.4)
IMM GRANULOCYTES # BLD AUTO: 0.04 THOUSAND/UL (ref 0–0.2)
IMM GRANULOCYTES NFR BLD AUTO: 1 % (ref 0–2)
LYMPHOCYTES # BLD AUTO: 1.44 THOUSANDS/ΜL (ref 0.6–4.47)
LYMPHOCYTES NFR BLD AUTO: 22 % (ref 14–44)
MCH RBC QN AUTO: 28.9 PG (ref 26.8–34.3)
MCHC RBC AUTO-ENTMCNC: 30.1 G/DL (ref 31.4–37.4)
MCV RBC AUTO: 96 FL (ref 82–98)
MONOCYTES # BLD AUTO: 0.51 THOUSAND/ΜL (ref 0.17–1.22)
MONOCYTES NFR BLD AUTO: 8 % (ref 4–12)
NEUTROPHILS # BLD AUTO: 4.43 THOUSANDS/ΜL (ref 1.85–7.62)
NEUTS SEG NFR BLD AUTO: 68 % (ref 43–75)
NRBC BLD AUTO-RTO: 0 /100 WBCS
PLATELET # BLD AUTO: 142 THOUSANDS/UL (ref 149–390)
PMV BLD AUTO: 10.5 FL (ref 8.9–12.7)
POTASSIUM SERPL-SCNC: 4 MMOL/L (ref 3.5–5.3)
RBC # BLD AUTO: 3.08 MILLION/UL (ref 3.81–5.12)
SODIUM SERPL-SCNC: 135 MMOL/L (ref 135–147)
WBC # BLD AUTO: 6.48 THOUSAND/UL (ref 4.31–10.16)

## 2024-09-26 PROCEDURE — 99239 HOSP IP/OBS DSCHRG MGMT >30: CPT | Performed by: PHYSICIAN ASSISTANT

## 2024-09-26 PROCEDURE — 94760 N-INVAS EAR/PLS OXIMETRY 1: CPT

## 2024-09-26 PROCEDURE — 80048 BASIC METABOLIC PNL TOTAL CA: CPT | Performed by: NURSE PRACTITIONER

## 2024-09-26 PROCEDURE — 94664 DEMO&/EVAL PT USE INHALER: CPT

## 2024-09-26 PROCEDURE — 94640 AIRWAY INHALATION TREATMENT: CPT

## 2024-09-26 PROCEDURE — 82948 REAGENT STRIP/BLOOD GLUCOSE: CPT

## 2024-09-26 PROCEDURE — 85025 COMPLETE CBC W/AUTO DIFF WBC: CPT | Performed by: NURSE PRACTITIONER

## 2024-09-26 PROCEDURE — 99232 SBSQ HOSP IP/OBS MODERATE 35: CPT | Performed by: ANESTHESIOLOGY

## 2024-09-26 RX ORDER — TAMSULOSIN HYDROCHLORIDE 0.4 MG/1
0.4 CAPSULE ORAL
Qty: 14 CAPSULE | Refills: 0 | Status: SHIPPED | OUTPATIENT
Start: 2024-09-26

## 2024-09-26 RX ORDER — HEPARIN 100 UNIT/ML
500 SYRINGE INTRAVENOUS ONCE
Status: COMPLETED | OUTPATIENT
Start: 2024-09-26 | End: 2024-09-26

## 2024-09-26 RX ORDER — TACROLIMUS 1 MG/1
2 CAPSULE ORAL 2 TIMES DAILY
Start: 2024-09-26

## 2024-09-26 RX ADMIN — PANCRELIPASE 48000 UNITS: 120000; 24000; 76000 CAPSULE, DELAYED RELEASE PELLETS ORAL at 11:37

## 2024-09-26 RX ADMIN — PREDNISONE 15 MG: 5 TABLET ORAL at 08:32

## 2024-09-26 RX ADMIN — APIXABAN 5 MG: 5 TABLET, FILM COATED ORAL at 08:32

## 2024-09-26 RX ADMIN — POLYETHYLENE GLYCOL 3350 17 G: 17 POWDER, FOR SOLUTION ORAL at 08:32

## 2024-09-26 RX ADMIN — OXYCODONE HYDROCHLORIDE 10 MG: 10 TABLET ORAL at 08:06

## 2024-09-26 RX ADMIN — LEVALBUTEROL HYDROCHLORIDE 1.25 MG: 1.25 SOLUTION RESPIRATORY (INHALATION) at 07:14

## 2024-09-26 RX ADMIN — OXYBUTYNIN CHLORIDE 5 MG: 5 TABLET ORAL at 08:32

## 2024-09-26 RX ADMIN — Medication 5000 UNITS: at 08:32

## 2024-09-26 RX ADMIN — Medication 125 MG: at 08:34

## 2024-09-26 RX ADMIN — OXYCODONE HYDROCHLORIDE 10 MG: 10 TABLET ORAL at 03:14

## 2024-09-26 RX ADMIN — CYANOCOBALAMIN TAB 500 MCG 1000 MCG: 500 TAB at 08:32

## 2024-09-26 RX ADMIN — PREGABALIN 150 MG: 75 CAPSULE ORAL at 08:31

## 2024-09-26 RX ADMIN — SODIUM CHLORIDE SOLN NEBU 3% 4 ML: 3 NEBU SOLN at 07:14

## 2024-09-26 RX ADMIN — BUDESONIDE AND FORMOTEROL FUMARATE DIHYDRATE 2 PUFF: 160; 4.5 AEROSOL RESPIRATORY (INHALATION) at 08:34

## 2024-09-26 RX ADMIN — PHENAZOPYRIDINE HYDROCHLORIDE 100 MG: 100 TABLET ORAL at 11:37

## 2024-09-26 RX ADMIN — TACROLIMUS 2 MG: 1 CAPSULE ORAL at 08:31

## 2024-09-26 RX ADMIN — HEPARIN 500 UNITS: 100 SYRINGE at 17:53

## 2024-09-26 RX ADMIN — PHENAZOPYRIDINE HYDROCHLORIDE 100 MG: 100 TABLET ORAL at 08:33

## 2024-09-26 RX ADMIN — INSULIN GLARGINE 12 UNITS: 100 INJECTION, SOLUTION SUBCUTANEOUS at 08:32

## 2024-09-26 RX ADMIN — VALACYCLOVIR HYDROCHLORIDE 500 MG: 500 TABLET, FILM COATED ORAL at 08:34

## 2024-09-26 RX ADMIN — INSULIN LISPRO 3 UNITS: 100 INJECTION, SOLUTION INTRAVENOUS; SUBCUTANEOUS at 11:37

## 2024-09-26 RX ADMIN — MAGNESIUM OXIDE TAB 400 MG (241.3 MG ELEMENTAL MG) 400 MG: 400 (241.3 MG) TAB at 08:32

## 2024-09-26 RX ADMIN — PANTOPRAZOLE SODIUM 40 MG: 40 TABLET, DELAYED RELEASE ORAL at 05:11

## 2024-09-26 RX ADMIN — ATOVAQUONE 1500 MG: 750 SUSPENSION ORAL at 08:31

## 2024-09-26 RX ADMIN — INSULIN LISPRO 3 UNITS: 100 INJECTION, SOLUTION INTRAVENOUS; SUBCUTANEOUS at 08:34

## 2024-09-26 RX ADMIN — PANCRELIPASE 48000 UNITS: 120000; 24000; 76000 CAPSULE, DELAYED RELEASE PELLETS ORAL at 08:33

## 2024-09-26 NOTE — ASSESSMENT & PLAN NOTE
Lab Results   Component Value Date    HGBA1C 7.5 (H) 09/21/2024       Recent Labs     09/25/24  1613 09/25/24  2120 09/26/24  0726 09/26/24  1100   POCGLU 205* 124 115 126       Home regimen: Lantus 12 units in am, Aspart 6 units TID, continue and follow up with Endocrinology

## 2024-09-26 NOTE — ASSESSMENT & PLAN NOTE
Patient had fever of 102 on 9/22/2024, and another fever of 102 9/25.  Possibly secondary to viral infection has had sore throat with negative strep workup.  Recent repeat CT abdomen pelvis shows multiple small stone fragments due to recent lithotripsy, however no hydronephrosis noted.  Patient is immunocompromised therefore was started on empiric IV Rocephin, completed 3 doses.  Urine culture with mixed contaminants, will hold off on further antibiotics.  BC negative to date  Patient requesting to be discharged today. Patient has been afebrile for >24 hours, no leukocytosis, urine culture grew only 40,000-49,000 mixed contaminants x4 and she already received 3 days of IV ceftriaxone, and blood cultures with NGTD.   Patient advised to return to the hospital with any additional fevers and expressed verbal understanding and agreement

## 2024-09-26 NOTE — DISCHARGE SUMMARY
Discharge Summary - Hospitalist   Name: Maximilian Mann 48 y.o. female I MRN: [id number]  [facility] Date of Admission: 9/20/2024   Date of Service: 9/26/2024 I Hospital Day: 6     Assessment & Plan  Renal calculus, left  Patient with history of kidney stone, history of lung transplant, currently on immunosuppressant, presented to [facility] with left flank pain since 1 day, CT renal stone study - Left renal pelvic calculus measuring 1.5 x 0.8 x 1.2 cm. Mild fullness of the lower pole calyx. Transferred to [facility] for urology intervention  S/p cystoscopy, ureteroscopy with lithotripsy holmium laser, retrograde pyelogram and insertion of left ureteral stent and basket stone extraction 9/20/24  Urology recommending repeat CT in 2 weeks, ~10/4.  If minimal stone burden then stent will be removed but if large stone burden persistent then plan is for second stage ureteroscopy/stone treatment.   Had repeat CT abd/pelvis on 9/23/24 due to fever, ongoing abdominal pain: Interval lithotripsy of previously noted large calculus in the left renal pelvis, now with multiple small stone fragments measuring up to 4 mm noted in the interpolar and lower pole calyces. A left ureteral stent is seen in place, in good position. No hydronephrosis.  Pain controlled  Outpatient follow up with Urology  Fever  Patient had fever of 102 on 9/22/2024, and another fever of 102 9/25.  Possibly secondary to viral infection has had sore throat with negative strep workup.  Recent repeat CT abdomen pelvis shows multiple small stone fragments due to recent lithotripsy, however no hydronephrosis noted.  Patient is immunocompromised therefore was started on empiric IV Rocephin, completed 3 doses.  Urine culture with mixed contaminants, will hold off on further antibiotics.  BC negative to date  Patient requesting to be discharged today. Patient has been afebrile for >24 hours, no leukocytosis, urine culture grew only 40,000-49,000  mixed contaminants x4 and she already received 3 days of IV ceftriaxone, and blood cultures with NGTD.   Patient advised to return to the hospital with any additional fevers and expressed verbal understanding and agreement   Hx of lung transplant ([facility])  H/o cystic fibrosis s/p bilateral lung transplant in 2013 and left lung transplant on 3/1/24999, S/p right pneumonectomy  Home regimen - Prednisone 15 mg daily(being tapered down to 10 mg), Tacrolimus 3.5 mg in the morning and 3 mg in the evening,   Continue atovaquone 1500 mg daily, Azithromycin 250 mg 3 times weekly on M/W/F, Valacyclovir 500 mg daily  Tacrolimus level originally 17+, prior provider discussed with [place] transplant center, tacrolimus was held.  Was restarted 9/22 at 2 mg twice daily with repeat level 9/23 of 7.3.  Goal trough level 6-8.   Patient's original transplant specialist is Dr. Chavez Kurtz, the transplant center can be reached at # [phone] for any questions/further needs  Advised close outpatient f/u, patient instructed to call [place] on d/c to discuss need for timing of further tac level draws (usually does monthly)    Sore throat  Reported sore throat earlier this admission, patient was concerned about strep, Strep A negative.   Now resolved     Type 1 diabetes mellitus with diabetic chronic kidney disease, unspecified CKD stage ([facility])  Lab Results   Component Value Date    HGBA1C 7.5 (H) 09/21/2024       Recent Labs     09/25/24  1613 09/25/24  2120 09/26/24  0726 09/26/24  1100   POCGLU 205* 124 115 126       Home regimen: Lantus 12 units in am, Aspart 6 units TID, continue and follow up with Endocrinology    History of pulmonary embolus (PE)  Continue Eliquis, has hx of PE/DVT   Asthma  Continue Symbicort, prn Albuterol  Not in acute exacerbation at this time  Stage 3a chronic kidney disease ([facility])  Lab Results   Component Value Date    EGFR 59 09/26/2024    EGFR 59 09/25/2024    EGFR 58 09/24/2024    CREATININE 1.11 09/26/2024     "CREATININE 1.11 09/25/2024    CREATININE 1.13 09/24/2024   Baseline creatinine 1 to 1.2, currently stable.  Resume home diuretics on discharge   Continuous opioid dependence (HCC)  PCP manages pain as outpatient  Was evaluated by APS here  Now off ketamine GTT  Did not prescribe additional narcotic pain medications on discharge, discussed with APS  Pancreatic insufficiency  Continue Pancrealipase     Medical Problems       Resolved Problems  Date Reviewed: 9/26/2024   None       Discharging Physician / Practitioner: Andrea Dos Santos PA-C  PCP: Solsi Avila MD  Admission Date:   Admission Orders (From admission, onward)       Ordered        09/20/24 1749  Inpatient Admission  Once                          Discharge Date: 09/26/24    Consultations During Hospital Stay:  Urology  APS    Procedures Performed:   CT renal stone study  Left - CYSTOSCOPY URETEROSCOPY WITH LITHOTRIPSY HOLMIUM LASER. RETROGRADE PYELOGRAM AND INSERTION STENT URETERAL. BASKET STONE EXTRACTION   CT abdomen pelvis  CBC  CMP  Lactic  Tacrolimus level  Urine culture  Stone analysis  Strep A PCR  HbA1c    Significant Findings / Test Results:   CT renal stone study: \"Left renal pelvic calculus measuring 1.5 x 0.8  x 1.2 cm. Mild fullness of the lower pole calyx. Stool impaction in the colon.\"  CT abdomen pelvis: \"Interval lithotripsy of previously noted large calculus in the left renal pelvis, now with multiple small stone fragments measuring up to 4 mm noted in the interpolar and lower pole calyces. A left ureteral stent is seen in place, in good position. No hydronephrosis.\"  Tacrolimus level: 17.6, 7.3  Urine culture: 60,86501 Candida albicans, 50,000-59,000 Candida glabrata, 40,000-49,000 mixed contaminants x4  Strep A PCR: negative    Incidental Findings:   None     Test Results Pending at Discharge (will require follow up):   Stone analysis - follow up with Urology     Outpatient Tests Requested:  Follow up with PCP, Urology and transplant " "team    Complications:  None    Reason for Admission: left renal calculus    Hospital Course:   Maximilian Mann is a 48 y.o. female with history of cystic fibrosis s/p lung transplant, immunosuppression, type 1 diabetes, history of PE on Eliquis, asthma, CKD3, GERD, opioid dependence, pancreatic insufficiency who originally presented to [facility] on 9/20/2024 as a transfer from the [facility] where she initially presented with left flank pain. CT renal stone study revealed, \"Left renal pelvic calculus measuring 1.5 x 0.8  x 1.2 cm. Mild fullness of the lower pole calyx. Stool impaction in the colon.\" She was transferred for Urology eval. She underwent Left - CYSTOSCOPY URETEROSCOPY WITH LITHOTRIPSY HOLMIUM LASER. RETROGRADE PYELOGRAM AND INSERTION STENT URETERAL. BASKET STONE EXTRACTION by Dr. Briceno 9/20. Her tacrolimus level was noted to be >17 and prior provider discussed with the patient's transplant center and her tacrolimus dose was adjusted. She was seen in consult by APS for assistance with pain control and was on a ketamine GTT. Patient complained of sore throat, Strep A negative. She had fevers. Repeat CT abdomen/pelvis revealed \"Interval lithotripsy of previously noted large calculus in the left renal pelvis, now with multiple small stone fragments measuring up to 4 mm noted in the interpolar and lower pole calyces. A left ureteral stent is seen in place, in good position. No hydronephrosis.\" She completed 3 days of IV ceftriaxone. Urine cultures grew only candida and mixed contaminants. Blood cultures with NGTD. Patient adamant about being discharged home today.    Please see above list of diagnoses and related plan for additional information.     Condition at Discharge: stable    Discharge Day Visit / Exam:   Subjective:    Ms. Benjamin reports wanting to go home. She denies any more fevers. Pain controlled.     Vitals: Blood Pressure: 111/70 (09/26/24 0728)  Pulse: 68 (09/26/24 0728)  Temperature: " "97.5 °F (36.4 °C) (09/26/24 0728)  Temp Source: Oral (09/25/24 0845)  Respirations: 16 (09/26/24 0728)  Height: 5' 2\" (157.5 cm) (09/20/24 2216)  Weight - Scale: 56.6 kg (124 lb 12.5 oz) (09/20/24 2216)  SpO2: 90 % (09/26/24 0915)  Exam:   Physical Exam  Vitals reviewed.   Constitutional:       Comments: Patient seen sitting in bed comfortably resting, NAD   Cardiovascular:      Rate and Rhythm: Normal rate and regular rhythm.   Pulmonary:      Effort: Pulmonary effort is normal. No respiratory distress.      Breath sounds: Normal breath sounds.   Abdominal:      General: Bowel sounds are normal.      Palpations: Abdomen is soft.      Tenderness: There is no abdominal tenderness.   Musculoskeletal:      Right lower leg: No edema.      Left lower leg: No edema.   Skin:     General: Skin is warm.   Neurological:      Mental Status: She is alert and oriented to person, place, and time.   Psychiatric:         Mood and Affect: Mood normal.          Discussion with Family: Patient declined call to .     Discharge instructions/Information to patient and family:   See after visit summary for information provided to patient and family.      Provisions for Follow-Up Care:  See after visit summary for information related to follow-up care and any pertinent home health orders.      Mobility at time of Discharge:   Basic Mobility Inpatient Raw Score: 24  JH-HLM Goal: 8: Walk 250 feet or more  JH-HLM Achieved: 7: Walk 25 feet or more  HLM Goal NOT achieved. Continue to encourage mobility in post discharge setting.     Disposition:   Home    Planned Readmission: None    Discharge Medications:  See after visit summary for reconciled discharge medications provided to patient and/or family.      Administrative Statements   Discharge Statement:  I have spent a total time of 45 minutes in caring for this patient on the day of the visit/encounter. >30 minutes of time was spent on: Instructions for management, Impressions, " Counseling / Coordination of care, Documenting in the medical record, Reviewing / ordering tests, medicine, procedures  , and Communicating with other healthcare professionals .    **Please Note: This note may have been constructed using a voice recognition system**

## 2024-09-26 NOTE — ASSESSMENT & PLAN NOTE
Patient with history of kidney stone, history of lung transplant, currently on immunosuppressant, presented to [facility] with left flank pain since 1 day, CT renal stone study - Left renal pelvic calculus measuring 1.5 x 0.8 x 1.2 cm. Mild fullness of the lower pole calyx. Transferred to [facility] for urology intervention  S/p cystoscopy, ureteroscopy with lithotripsy holmium laser, retrograde pyelogram and insertion of left ureteral stent and basket stone extraction 9/20/24  Urology recommending repeat CT in 2 weeks, ~10/4.  If minimal stone burden then stent will be removed but if large stone burden persistent then plan is for second stage ureteroscopy/stone treatment.   Had repeat CT abd/pelvis on 9/23/24 due to fever, ongoing abdominal pain: Interval lithotripsy of previously noted large calculus in the left renal pelvis, now with multiple small stone fragments measuring up to 4 mm noted in the interpolar and lower pole calyces. A left ureteral stent is seen in place, in good position. No hydronephrosis.  Pain controlled  Outpatient follow up with Urology

## 2024-09-26 NOTE — PROGRESS NOTES
Pt refusing second set of blood cultures. One set was sent 9/25 and pending. Dr. Andrea Dos Santos aware.

## 2024-09-26 NOTE — ASSESSMENT & PLAN NOTE
Lab Results   Component Value Date    HGBA1C 7.5 (H) 09/21/2024       Recent Labs     09/25/24  1613 09/25/24  2120 09/26/24  0726 09/26/24  1100   POCGLU 205* 124 115 126       Blood Sugar Average: Last 72 hrs:  (P) [id number]

## 2024-09-26 NOTE — PROGRESS NOTES
"Progress Note - Acute Pain   Name: Maximilian Mann 48 y.o. female I MRN: [id number]  Unit/Bed#: [facility] 934-01 I Date of Admission: 9/20/2024   Date of Service: 9/26/2024 I Hospital Day: 6    Assessment & Plan  Renal calculus, left  Lyrica 150 mg p.o. twice daily (home med and dose)  Continue Tylenol 975 mg p.o. every 6 hours as needed mild pain (following up on blood cultures are per primary servie  Oxycodone 5 mg p.o. every 4 hours as needed moderate pain or 10 mg p.o. every 4 hours as needed severe pain.  Discontinue IV dilaudid order. Pt eager to go home today  Continue heating pad as needed.  Add as needed Narcan in the event that opioid reversal is necessary.  Bowel regimen to avoid opioid-induced constipation while on opioid pain medication.  Stage 3a chronic kidney disease (HCC)  Lab Results   Component Value Date    EGFR 59 09/26/2024    EGFR 59 09/25/2024    EGFR 58 09/24/2024    CREATININE 1.11 09/26/2024    CREATININE 1.11 09/25/2024    CREATININE 1.13 09/24/2024   Estimated Creatinine Clearance: 50.1 mL/min (by C-G formula based on SCr of 1.11 mg/dL).  Will attempt to minimize nephrotoxic pain medications.  Hx of lung transplant (HCC)  Will avoid NSAIDs.  Continuous opioid dependence (HCC)  Patient is prescribed oxycodone 10 mg, 180 tablets for 30 days.  Patient gets this prescription once a month.  Patient fills this prescription monthly.  Patient states she takes only 1 or 2 tablets of this per month. However, when inquired again if this was true, she did mention she takes it \"fluidly\" and on occasion, it has been every 4-6 hrs as prescribed. She says it is for rib pain. She has been ordered in the past for 10 mg TID oxycodone to asmuch as 20 mg TID PRN.   Spoke with primary service: given most recent script filled for oxycodone (9/3), primary service will not provide additional oxycodone scripts. Patient is aware and understands.   Type 1 diabetes mellitus with diabetic chronic kidney disease, " unspecified CKD stage (HCC)  Lab Results   Component Value Date    HGBA1C 7.5 (H) 09/21/2024       Recent Labs     09/25/24  1613 09/25/24  2120 09/26/24  0726 09/26/24  1100   POCGLU 205* 124 115 126       Blood Sugar Average: Last 72 hrs:  (P) [id number]      APS will sign off at this time. Thank you for the consult. All opioids and other analgesics to be written at discretion of primary team. Please contact Acute Pain Service - via SecureChat from [phone] with additional questions or concerns. See SecureChat or Audience for additional contacts and after hours information.    History of Present Illness   Maximilian Mann is a 48 y.o. female with left flank pain beginning 9/20/2024. Patient was seen in the emergency department and transferred to [facility] for further management. She underwent cystoscopy, ureteroscopy, holmium laser lithotripsy and insertion of left ureteral stent with basket stone extraction on 9/20/2024. She is POD 6. Pt seen in her room. She is very eager to go home. She is awaiting results from her blood cultures to come back (as per SLIM). She is content with her current pain regimen. She tolerates PO diet. She did ask when she was to receive IV dilaudid. I mentioned the need to wean off the IV meds if she is really to go home today. She is agreeable to take PO oxycodone if needed.    Pain History  Current pain location(s):  Pain Score: 5  Pain Location/Orientation: Orientation: Left, Orientation: Lower, Location: Back  Pain Scale: Pain Assessment Tool: 0-10  24 hour history: dilaudid IV weaned    Opioid requirement previous 24 hours: dilaudid 1.5 mg IV, oxycodone 10 mg PO x 4 doses  Meds/Allergies   all current active meds have been reviewed, current meds:   Current Facility-Administered Medications:     acetaminophen (TYLENOL) tablet 975 mg, Q6H PRN    albuterol (PROVENTIL HFA,VENTOLIN HFA) inhaler 2 puff, Q4H PRN    ALPRAZolam (XANAX) tablet 1 mg, HS PRN    apixaban (ELIQUIS)  tablet 5 mg, BID    ascorbic acid (VITAMIN C) tablet 125 mg, Daily    atovaquone (MEPRON) oral suspension 1,500 mg, Daily    azithromycin (ZITHROMAX) tablet 250 mg, Once per day on Monday Wednesday Friday    budesonide-formoterol (SYMBICORT) 160-4.5 mcg/act inhaler 2 puff, BID    Cholecalciferol (VITAMIN D3) tablet 5,000 Units, Daily    cyanocobalamin (VITAMIN B-12) tablet 1,000 mcg, Daily    dextromethorphan-guaiFENesin (ROBITUSSIN DM) oral syrup 5 mL, Q6H PRN    famotidine (PEPCID) tablet 40 mg, HS    HYDROmorphone (DILAUDID) injection 1 mg, Q12H PRN    insulin glargine (LANTUS) subcutaneous injection 12 Units 0.12 mL, QAM    insulin lispro (HumALOG/ADMELOG) 100 units/mL subcutaneous injection 1-5 Units, TID AC **AND** Fingerstick Glucose (POCT), TID AC    insulin lispro (HumALOG/ADMELOG) 100 units/mL subcutaneous injection 1-5 Units, HS    insulin lispro (HumALOG/ADMELOG) 100 units/mL subcutaneous injection 3 Units, TID With Meals    levalbuterol (XOPENEX) inhalation solution 1.25 mg, BID    magnesium Oxide (MAG-OX) tablet 400 mg, Daily    naloxone (NARCAN) 0.04 mg/mL syringe 0.04 mg, Q1MIN PRN    ondansetron (ZOFRAN-ODT) dispersible tablet 4 mg, Q6H PRN    oxybutynin (DITROPAN) tablet 5 mg, TID    oxyCODONE (ROXICODONE) IR tablet 5 mg, Q4H PRN **OR** oxyCODONE (ROXICODONE) immediate release tablet 10 mg, Q4H PRN    pancrelipase (Lip-Prot-Amyl) (CREON) delayed release capsule 48,000 Units, TID With Meals    pantoprazole (PROTONIX) EC tablet 40 mg, Early Morning    phenazopyridine (PYRIDIUM) tablet 100 mg, TID With Meals    polyethylene glycol (MIRALAX) packet 17 g, Daily    predniSONE tablet 15 mg, Daily    pregabalin (LYRICA) capsule 150 mg, BID    sodium chloride 3 % inhalation solution 4 mL, BID    tacrolimus (PROGRAF) capsule 2 mg, Q12H DOTTY    tamsulosin (FLOMAX) capsule 0.4 mg, Daily With Dinner    valACYclovir (VALTREX) tablet 500 mg, Daily, and PTA meds:   Prior to Admission Medications   Prescriptions  Last Dose Informant Patient Reported? Taking?   ALPRAZolam (XANAX) 1 mg tablet   No No   Sig: Take 1 tablet (1 mg total) by mouth daily at bedtime as needed for anxiety or sleep   Ascorbic Acid (vitamin C) 100 MG tablet  Self Yes No   Sig: Take 100 mg by mouth daily   BD Pen Needle Abi U/F 32G X 4 MM MISC  Self No No   Si times daily   Cholecalciferol (Vitamin D3) 125 MCG (5000 UT) TABS  Self Yes No   Sig: Take 5,000 Units by mouth daily   Continuous Blood Gluc  (Dexcom G7 ) SUKHI  Self No No   Sig: Use 1 each continuous   Continuous Glucose Sensor (Dexcom G7 Sensor)  Self No No   Sig: Use 1 Device every 10 days   Insulin Disposable Pump (Omnipod 5 G6 Intro, Gen 5,) KIT  Self No No   Sig: Use 1 Units continuous   Insulin Disposable Pump (Omnipod 5 G6 Pods, Gen 5,) MISC  Self No No   Sig: Use 1 Units every other day   Insulin Glargine-yfgn (Semglee, yfgn,) 100 UNIT/ML SOPN   No No   Sig: Inject 0.12 mL (12 Units total) under the skin every morning   Insulin Pen Needle (Comfort EZ Pen Needles) 33G X 4 MM MISC  Self No No   Sig: Use to inject insulin 4 times a day   Respiratory Therapy Supplies (Nebulizer) SUKHI  Self Yes No   Sig: Please dispense nebulizer machine   Turmeric 500 MG CAPS  Self Yes No   Sig: Take by mouth   acetaminophen (TYLENOL) 325 mg tablet  Self No No   Sig: Take 3 tablets (975 mg total) by mouth every 8 (eight) hours   albuterol (2.5 mg/3 mL) 0.083 % nebulizer solution  Self No No   Sig: Take 6 mL (5 mg total) by nebulization 2 (two) times a day   albuterol (PROVENTIL HFA,VENTOLIN HFA) 90 mcg/act inhaler  Self Yes No   Sig: INHALE 2 PUFFS EVERY 8 HOURS AS NEEDED FOR SHORTNESS OF BREATH OR WHEEZING.   apixaban (ELIQUIS) 5 mg  Self No No   Sig: Take 1 tablet (5 mg total) by mouth 2 (two) times a day   atovaquone (MEPRON) 750 mg/5 mL suspension  Self Yes No   Sig: Take 1,500 mg by mouth daily   azithromycin (ZITHROMAX) 250 mg tablet  Self Yes No   Sig: Take 250 mg by mouth 3  (three) times a week   bisacodyl (DULCOLAX) 5 mg EC tablet  Self No No   Sig: Take 1 tablet (5 mg total) by mouth daily at bedtime   dexlansoprazole (DEXILANT) 60 MG capsule  Self No No   Sig: TAKE 1 CAPSULE BY MOUTH EVERY DAY IN THE MORNING   doxycycline hyclate (VIBRA-TABS) 100 mg tablet  Self Yes No   Sig: Take 100 mg by mouth 2 (two) times a day   Patient not taking: Reported on 2024   dronabinol (MARINOL) 5 MG capsule  Self No No   Sig: Take 1 capsule (5 mg total) by mouth 3 (three) times a day   famotidine (PEPCID) 40 MG tablet  Self No No   Sig: Take 1 tablet (40 mg total) by mouth daily at bedtime   insulin aspart (NovoLOG FlexPen) 100 UNIT/ML injection pen   No No   Sig: INJECT 10 UNITS pre-BREAKFAST, 8 UNITS pre-LUNCH and 12 UNITS pre-DINNER plus scale; max 50 UNITS/day   multivitamin (THERAGRAN) TABS  Self Yes No   Sig: Take 1 tablet by mouth daily   naloxone (NARCAN) 4 mg/0.1 mL nasal spray  Self No No   Si.1 mL (4 mg total) into each nostril every 3 (three) minutes as needed for opioid reversal or respiratory depression   ondansetron (ZOFRAN) 4 mg tablet  Self Yes No   Sig: Take 4 mg by mouth   Patient not taking: Reported on 2024   oxyCODONE (ROXICODONE) 10 MG TABS   No No   Sig: Take 1 to 2 tablets every 8hs as needed for severe pain. Max dose 6 tabs a day   oxybutynin (DITROPAN XL) 15 MG 24 hr tablet   No No   Sig: Take 1 tablet (15 mg total) by mouth daily at bedtime   pancrelipase, Lip-Prot-Amyl, (CREON) 24,000 units  Self Yes No   Sig: Take 48,000 Units by mouth 3 (three) times a day with meals   polyethylene glycol (MIRALAX) 17 g packet  Self No No   Sig: Take 17 g by mouth 2 (two) times a day   predniSONE 10 mg tablet  Self Yes No   Sig: Take 10 mg by mouth daily   pregabalin (LYRICA) 150 mg capsule  Self No No   Sig: Take 1 capsule (150 mg total) by mouth 2 times a day   promethazine-dextromethorphan (PHENERGAN-DM) 6.25-15 mg/5 mL oral syrup  Self No No   Sig: Take 5 mL by mouth 4  (four) times a day as needed for cough   tacrolimus (PROGRAF) 1 mg capsule  Self Yes No   Sig: Take 2.5 mg by mouth 2 (two) times a day Take 2 tablets and a 0.5 table to total 2.5 mg twice a day 5mg total daily   valACYclovir (VALTREX) 500 mg tablet  Self Yes No   Sig: Take 500 mg by mouth daily   vitamin B-12 (VITAMIN B-12) 1,000 mcg tablet  Self Yes No   Sig: Take by mouth daily      Facility-Administered Medications: None     Allergies   Allergen Reactions    Bebtelovimab Anaphylaxis    Vancomycin Angioedema and Hives    Gabapentin Hallucinations    Ceftazidime Headache     Severe headaches after desensitization    Nsaids Other (See Comments)     Lung transplant increases risk of renal toxicity, call lung txp provider to discuss if needed       Objective      Temp:  [97.5 °F (36.4 °C)-98.2 °F (36.8 °C)] 97.5 °F (36.4 °C)  HR:  [58-75] 68  Resp:  [14-17] 16  BP: (102-111)/(62-70) 111/70  Physical Exam  Constitutional:       Appearance: Normal appearance.   HENT:      Head: Normocephalic and atraumatic.      Nose: Nose normal.      Mouth/Throat:      Mouth: Mucous membranes are moist.   Eyes:      Extraocular Movements: Extraocular movements intact.      Pupils: Pupils are equal, round, and reactive to light.   Cardiovascular:      Rate and Rhythm: Normal rate.      Pulses: Normal pulses.   Pulmonary:      Effort: Pulmonary effort is normal.   Musculoskeletal:         General: Normal range of motion.      Cervical back: Normal range of motion.   Skin:     General: Skin is warm.   Neurological:      General: No focal deficit present.      Mental Status: She is alert and oriented to person, place, and time. Mental status is at baseline.   Psychiatric:         Mood and Affect: Mood normal.         Behavior: Behavior normal.         Thought Content: Thought content normal.         Judgment: Judgment normal.          Lab Results: I have reviewed the following results:   Estimated Creatinine Clearance: 50.1 mL/min (by  C-G formula based on SCr of 1.11 mg/dL).  Lab Results   Component Value Date    WBC 6.48 09/26/2024    HGB 8.9 (L) 09/26/2024    HCT 29.6 (L) 09/26/2024     (L) 09/26/2024         Component Value Date/Time    K 4.0 09/26/2024 0544    K 4.7 08/18/2024 0535     09/26/2024 0544    CL 99 (L) 08/18/2024 0535    CO2 31 09/26/2024 0544    CO2 25 08/18/2024 0535    BUN 40 (H) 09/26/2024 0544    BUN 33 (H) 08/18/2024 0535    BUN 31 (H) 01/12/2024 0945    CREATININE 1.11 09/26/2024 0544    CREATININE 1.15 (H) 08/18/2024 0535         Component Value Date/Time    CALCIUM 8.2 (L) 09/26/2024 0544    CALCIUM 8.2 (L) 08/18/2024 0535    ALKPHOS 74 09/20/2024 0748    ALKPHOS 67 05/30/2024 0447    AST 11 (L) 09/20/2024 0748    AST 12 (L) 05/30/2024 0447    AST 11 01/12/2024 0945    ALT 18 09/20/2024 0748    ALT 9 (L) 05/30/2024 0447    ALT 11 01/12/2024 0945    TP 7.0 09/20/2024 0748    TP 6.4 05/30/2024 0447    ALB 3.8 09/20/2024 0748    ALB 3.2 (L) 05/30/2024 0447    ALB 3.9 01/12/2024 0945     Imaging Review: No pertinent imaging studies reviewed.  Other Studies: No additional pertinent studies reviewed.     Administrative Statements   I have spent a total time of 20 minutes in caring for this patient on the day of the visit/encounter including Instructions for management and Communicating with other healthcare professionals .

## 2024-09-26 NOTE — ASSESSMENT & PLAN NOTE
H/o cystic fibrosis s/p bilateral lung transplant in 2013 and left lung transplant on 3/1/78476, S/p right pneumonectomy  Home regimen - Prednisone 15 mg daily(being tapered down to 10 mg), Tacrolimus 3.5 mg in the morning and 3 mg in the evening,   Continue atovaquone 1500 mg daily, Azithromycin 250 mg 3 times weekly on M/W/F, Valacyclovir 500 mg daily  Tacrolimus level originally 17+, prior provider discussed with [facility], tacrolimus was held.  Was restarted 9/22 at 2 mg twice daily with repeat level 9/23 of 7.3.  Goal trough level 6-8.   Patient's original transplant specialist is Dr. Chavez [email or web address], the transplant center can be reached at # [phone] for any questions/further needs  Advised close outpatient f/u, patient instructed to call [facility] on d/c to discuss need for timing of further tac level draws (usually does monthly)

## 2024-09-26 NOTE — ASSESSMENT & PLAN NOTE
"Patient is prescribed oxycodone 10 mg, 180 tablets for 30 days.  Patient gets this prescription once a month.  Patient fills this prescription monthly.  Patient states she takes only 1 or 2 tablets of this per month. However, when inquired again if this was true, she did mention she takes it \"fluidly\" and on occasion, it has been every 4-6 hrs as prescribed. She says it is for rib pain. She has been ordered in the past for 10 mg TID oxycodone to asmuch as 20 mg TID PRN.   Spoke with primary service: given most recent script filled for oxycodone (9/3), primary service will not provide additional oxycodone scripts. Patient is aware and understands.   "

## 2024-09-26 NOTE — ASSESSMENT & PLAN NOTE
Reported sore throat earlier this admission, patient was concerned about strep, Strep A negative.   Now resolved

## 2024-09-26 NOTE — NURSING NOTE
AVS reviewed with pt and all questions answered. Pt requested a heparin flush with her catheter since she has had 5 placed due to clotting. Hem-Onc Nnamdi Marquez MD was able to place heparin order for Port flush.

## 2024-09-26 NOTE — ASSESSMENT & PLAN NOTE
Lab Results   Component Value Date    EGFR 59 09/26/2024    EGFR 59 09/25/2024    EGFR 58 09/24/2024    CREATININE 1.11 09/26/2024    CREATININE 1.11 09/25/2024    CREATININE 1.13 09/24/2024   Estimated Creatinine Clearance: 50.1 mL/min (by C-G formula based on SCr of 1.11 mg/dL).  Will attempt to minimize nephrotoxic pain medications.

## 2024-09-26 NOTE — ASSESSMENT & PLAN NOTE
Lab Results   Component Value Date    EGFR 59 09/26/2024    EGFR 59 09/25/2024    EGFR 58 09/24/2024    CREATININE 1.11 09/26/2024    CREATININE 1.11 09/25/2024    CREATININE 1.13 09/24/2024   Baseline creatinine 1 to 1.2, currently stable.  Resume home diuretics on discharge

## 2024-09-26 NOTE — ASSESSMENT & PLAN NOTE
Lyrica 150 mg p.o. twice daily (home med and dose)  Continue Tylenol 975 mg p.o. every 6 hours as needed mild pain (following up on blood cultures are per primary servie  Oxycodone 5 mg p.o. every 4 hours as needed moderate pain or 10 mg p.o. every 4 hours as needed severe pain.  Discontinue IV dilaudid order. Pt eager to go home today  Continue heating pad as needed.  Add as needed Narcan in the event that opioid reversal is necessary.  Bowel regimen to avoid opioid-induced constipation while on opioid pain medication.

## 2024-09-26 NOTE — ASSESSMENT & PLAN NOTE
Department of Anesthesiology  Postprocedure Note    Patient: Lynn Bourgeois  MRN: [id number]  YOB: 1989    Procedure Summary       Date: 01/16/24 Room / Location: [facility] MAIN OR 08 / [facility] MAIN OR    Anesthesia Start: 1148 Anesthesia Stop: 1345    Procedure: RIGHT DISTAL RADIUS OPEN REDUCTION INTERNAL FIXATION (Right: Arm Lower) Diagnosis:       Other closed intra-articular fracture of distal end of right radius, initial encounter      (Other closed intra-articular fracture of distal end of right radius, initial encounter [S52.571A])    Surgeons: Hanane Proctor MD Responsible Provider: Valentin Muro MD    Anesthesia Type: General, Regional ASA Status: 3            Anesthesia Type: General, Regional    Arcelia Phase I: Arcelia Score: 10    Arcelia Phase II: Arcelia Score: 10    Anesthesia Post Evaluation    Patient location during evaluation: PACU  Patient participation: complete - patient cannot participate  Level of consciousness: awake  Pain score: 0  Airway patency: patent  Nausea & Vomiting: no nausea and no vomiting  Cardiovascular status: hemodynamically stable  Respiratory status: acceptable  Hydration status: stable  Multimodal analgesia pain management approach        No notable events documented.   PCP manages pain as outpatient  Was evaluated by APS here  Now off ketamine GTT  Did not prescribe additional narcotic pain medications on discharge, discussed with APS

## 2024-09-26 NOTE — PLAN OF CARE
Problem: PAIN - ADULT  Goal: Verbalizes/displays adequate comfort level or baseline comfort level  Description: Interventions:  - Encourage patient to monitor pain and request assistance  - Assess pain using appropriate pain scale  - Administer analgesics based on type and severity of pain and evaluate response  - Implement non-pharmacological measures as appropriate and evaluate response  - Consider cultural and social influences on pain and pain management  - Notify physician/advanced practitioner if interventions unsuccessful or patient reports new pain  Outcome: Progressing     Problem: INFECTION - ADULT  Goal: Absence or prevention of progression during hospitalization  Description: INTERVENTIONS:  - Assess and monitor for signs and symptoms of infection  - Monitor lab/diagnostic results  - Monitor all insertion sites, i.e. indwelling lines, tubes, and drains  - Monitor endotracheal if appropriate and nasal secretions for changes in amount and color  - Post appropriate cooling/warming therapies per order  - Administer medications as ordered  - Instruct and encourage patient and family to use good hand hygiene technique  - Identify and instruct in appropriate isolation precautions for identified infection/condition  Outcome: Progressing     Problem: SAFETY ADULT  Goal: Maintain or return to baseline ADL function  Description: INTERVENTIONS:  -  Assess patient's ability to carry out ADLs; assess patient's baseline for ADL function and identify physical deficits which impact ability to perform ADLs (bathing, care of mouth/teeth, toileting, grooming, dressing, etc.)  - Assess/evaluate cause of self-care deficits   - Assess range of motion  - Assess patient's mobility; develop plan if impaired  - Assess patient's need for assistive devices and provide as appropriate  - Encourage maximum independence but intervene and supervise when necessary  - Involve family in performance of ADLs  - Assess for home care  needs following discharge   - Consider OT consult to assist with ADL evaluation and planning for discharge  - Provide patient education as appropriate  Outcome: Progressing     Problem: Knowledge Deficit  Goal: Patient/family/caregiver demonstrates understanding of disease process, treatment plan, medications, and discharge instructions  Description: Complete learning assessment and assess knowledge base.  Interventions:  - Provide teaching at level of understanding  - Provide teaching via preferred learning methods  Outcome: Progressing

## 2024-09-27 ENCOUNTER — TELEPHONE (OUTPATIENT)
Dept: INTERNAL MEDICINE CLINIC | Facility: CLINIC | Age: 49
End: 2024-09-27

## 2024-09-27 ENCOUNTER — APPOINTMENT (EMERGENCY)
Dept: CT IMAGING | Facility: HOSPITAL | Age: 49
DRG: 872 | End: 2024-09-27
Payer: COMMERCIAL

## 2024-09-27 ENCOUNTER — HOSPITAL ENCOUNTER (INPATIENT)
Facility: HOSPITAL | Age: 49
LOS: 2 days | Discharge: HOME | DRG: 872 | End: 2024-09-29
Attending: EMERGENCY MEDICINE | Admitting: INTERNAL MEDICINE
Payer: COMMERCIAL

## 2024-09-27 ENCOUNTER — TRANSITIONAL CARE MANAGEMENT (OUTPATIENT)
Dept: INTERNAL MEDICINE CLINIC | Facility: CLINIC | Age: 49
End: 2024-09-27

## 2024-09-27 DIAGNOSIS — Z94.2 HX OF LUNG TRANSPLANT (HCC): ICD-10-CM

## 2024-09-27 DIAGNOSIS — N12 PYELONEPHRITIS: ICD-10-CM

## 2024-09-27 DIAGNOSIS — N39.0 UTI (URINARY TRACT INFECTION): Primary | ICD-10-CM

## 2024-09-27 PROBLEM — N30.00 ACUTE CYSTITIS: Status: ACTIVE | Noted: 2024-09-27

## 2024-09-27 PROBLEM — N10 ACUTE PYELONEPHRITIS: Status: ACTIVE | Noted: 2024-09-27

## 2024-09-27 PROBLEM — R79.89 ELEVATED SERUM CREATININE: Status: ACTIVE | Noted: 2024-09-27

## 2024-09-27 PROBLEM — A41.9 SEPSIS (HCC): Status: ACTIVE | Noted: 2024-09-27

## 2024-09-27 LAB
ALBUMIN SERPL BCG-MCNC: 3.3 G/DL (ref 3.5–5)
ALP SERPL-CCNC: 59 U/L (ref 34–104)
ALT SERPL W P-5'-P-CCNC: 12 U/L (ref 7–52)
ANION GAP SERPL CALCULATED.3IONS-SCNC: 8 MMOL/L (ref 4–13)
APTT PPP: >210 SECONDS (ref 23–34)
AST SERPL W P-5'-P-CCNC: 13 U/L (ref 13–39)
ATRIAL RATE: 96 BPM
BACTERIA BLD CULT: NORMAL
BACTERIA BLD CULT: NORMAL
BACTERIA UR QL AUTO: ABNORMAL /HPF
BASOPHILS # BLD AUTO: 0.02 THOUSANDS/ΜL (ref 0–0.1)
BASOPHILS NFR BLD AUTO: 0 % (ref 0–1)
BILIRUB SERPL-MCNC: 0.34 MG/DL (ref 0.2–1)
BILIRUB UR QL STRIP: ABNORMAL
BUN SERPL-MCNC: 46 MG/DL (ref 5–25)
CALCIUM ALBUM COR SERPL-MCNC: 9.3 MG/DL (ref 8.3–10.1)
CALCIUM SERPL-MCNC: 8.7 MG/DL (ref 8.4–10.2)
CHLORIDE SERPL-SCNC: 101 MMOL/L (ref 96–108)
CLARITY UR: ABNORMAL
CO2 SERPL-SCNC: 28 MMOL/L (ref 21–32)
COLOR UR: ABNORMAL
CREAT SERPL-MCNC: 1.32 MG/DL (ref 0.6–1.3)
EOSINOPHIL # BLD AUTO: 0.03 THOUSAND/ΜL (ref 0–0.61)
EOSINOPHIL NFR BLD AUTO: 0 % (ref 0–6)
ERYTHROCYTE [DISTWIDTH] IN BLOOD BY AUTOMATED COUNT: 14 % (ref 11.6–15.1)
FLUAV AG UPPER RESP QL IA.RAPID: NEGATIVE
FLUBV AG UPPER RESP QL IA.RAPID: NEGATIVE
GFR SERPL CREATININE-BSD FRML MDRD: 48 ML/MIN/1.73SQ M
GLUCOSE SERPL-MCNC: 128 MG/DL (ref 65–140)
GLUCOSE SERPL-MCNC: 198 MG/DL (ref 65–140)
GLUCOSE SERPL-MCNC: 232 MG/DL (ref 65–140)
GLUCOSE UR STRIP-MCNC: NEGATIVE MG/DL
HCT VFR BLD AUTO: 32.5 % (ref 34.8–46.1)
HGB BLD-MCNC: 9.9 G/DL (ref 11.5–15.4)
HGB UR QL STRIP.AUTO: ABNORMAL
IMM GRANULOCYTES # BLD AUTO: 0.08 THOUSAND/UL (ref 0–0.2)
IMM GRANULOCYTES NFR BLD AUTO: 1 % (ref 0–2)
INR PPP: 1.37 (ref 0.85–1.19)
KETONES UR STRIP-MCNC: NEGATIVE MG/DL
LACTATE SERPL-SCNC: 1.1 MMOL/L (ref 0.5–2)
LEUKOCYTE ESTERASE UR QL STRIP: ABNORMAL
LYMPHOCYTES # BLD AUTO: 1.86 THOUSANDS/ΜL (ref 0.6–4.47)
LYMPHOCYTES NFR BLD AUTO: 14 % (ref 14–44)
MCH RBC QN AUTO: 29 PG (ref 26.8–34.3)
MCHC RBC AUTO-ENTMCNC: 30.5 G/DL (ref 31.4–37.4)
MCV RBC AUTO: 95 FL (ref 82–98)
MONOCYTES # BLD AUTO: 1.19 THOUSAND/ΜL (ref 0.17–1.22)
MONOCYTES NFR BLD AUTO: 9 % (ref 4–12)
NEUTROPHILS # BLD AUTO: 10.27 THOUSANDS/ΜL (ref 1.85–7.62)
NEUTS SEG NFR BLD AUTO: 76 % (ref 43–75)
NITRITE UR QL STRIP: POSITIVE
NON-SQ EPI CELLS URNS QL MICRO: ABNORMAL /HPF
NRBC BLD AUTO-RTO: 0 /100 WBCS
PH UR STRIP.AUTO: 6 [PH]
PLATELET # BLD AUTO: 170 THOUSANDS/UL (ref 149–390)
PMV BLD AUTO: 10.2 FL (ref 8.9–12.7)
POTASSIUM SERPL-SCNC: 4.5 MMOL/L (ref 3.5–5.3)
PR INTERVAL: 122 MS
PROCALCITONIN SERPL-MCNC: 0.19 NG/ML
PROT SERPL-MCNC: 6.6 G/DL (ref 6.4–8.4)
PROT UR STRIP-MCNC: ABNORMAL MG/DL
PROTHROMBIN TIME: 17.6 SECONDS (ref 12.3–15)
QRS AXIS: 61 DEGREES
QRSD INTERVAL: 68 MS
QT INTERVAL: 320 MS
QTC INTERVAL: 404 MS
RBC # BLD AUTO: 3.41 MILLION/UL (ref 3.81–5.12)
RBC #/AREA URNS AUTO: ABNORMAL /HPF
SARS-COV+SARS-COV-2 AG RESP QL IA.RAPID: NEGATIVE
SODIUM SERPL-SCNC: 137 MMOL/L (ref 135–147)
SP GR UR STRIP.AUTO: 1.02 (ref 1–1.03)
T WAVE AXIS: 61 DEGREES
UROBILINOGEN UR STRIP-ACNC: 2 MG/DL
VENTRICULAR RATE: 96 BPM
WBC # BLD AUTO: 13.45 THOUSAND/UL (ref 4.31–10.16)
WBC #/AREA URNS AUTO: ABNORMAL /HPF

## 2024-09-27 PROCEDURE — 87040 BLOOD CULTURE FOR BACTERIA: CPT

## 2024-09-27 PROCEDURE — 83605 ASSAY OF LACTIC ACID: CPT

## 2024-09-27 PROCEDURE — 99284 EMERGENCY DEPT VISIT MOD MDM: CPT

## 2024-09-27 PROCEDURE — 87086 URINE CULTURE/COLONY COUNT: CPT

## 2024-09-27 PROCEDURE — 85730 THROMBOPLASTIN TIME PARTIAL: CPT

## 2024-09-27 PROCEDURE — 99285 EMERGENCY DEPT VISIT HI MDM: CPT

## 2024-09-27 PROCEDURE — 96368 THER/DIAG CONCURRENT INF: CPT

## 2024-09-27 PROCEDURE — 93005 ELECTROCARDIOGRAM TRACING: CPT

## 2024-09-27 PROCEDURE — 74176 CT ABD & PELVIS W/O CONTRAST: CPT

## 2024-09-27 PROCEDURE — 94760 N-INVAS EAR/PLS OXIMETRY 1: CPT

## 2024-09-27 PROCEDURE — 71250 CT THORAX DX C-: CPT

## 2024-09-27 PROCEDURE — 99255 IP/OBS CONSLTJ NEW/EST HI 80: CPT | Performed by: INTERNAL MEDICINE

## 2024-09-27 PROCEDURE — 87804 INFLUENZA ASSAY W/OPTIC: CPT

## 2024-09-27 PROCEDURE — 99223 1ST HOSP IP/OBS HIGH 75: CPT | Performed by: INTERNAL MEDICINE

## 2024-09-27 PROCEDURE — 85610 PROTHROMBIN TIME: CPT

## 2024-09-27 PROCEDURE — 96367 TX/PROPH/DG ADDL SEQ IV INF: CPT

## 2024-09-27 PROCEDURE — 96365 THER/PROPH/DIAG IV INF INIT: CPT

## 2024-09-27 PROCEDURE — 84145 PROCALCITONIN (PCT): CPT

## 2024-09-27 PROCEDURE — 82948 REAGENT STRIP/BLOOD GLUCOSE: CPT

## 2024-09-27 PROCEDURE — 96376 TX/PRO/DX INJ SAME DRUG ADON: CPT

## 2024-09-27 PROCEDURE — 85025 COMPLETE CBC W/AUTO DIFF WBC: CPT

## 2024-09-27 PROCEDURE — 80053 COMPREHEN METABOLIC PANEL: CPT

## 2024-09-27 PROCEDURE — 81001 URINALYSIS AUTO W/SCOPE: CPT

## 2024-09-27 PROCEDURE — 96375 TX/PRO/DX INJ NEW DRUG ADDON: CPT

## 2024-09-27 PROCEDURE — 94640 AIRWAY INHALATION TREATMENT: CPT

## 2024-09-27 PROCEDURE — 36415 COLL VENOUS BLD VENIPUNCTURE: CPT

## 2024-09-27 PROCEDURE — 87811 SARS-COV-2 COVID19 W/OPTIC: CPT

## 2024-09-27 PROCEDURE — 93010 ELECTROCARDIOGRAM REPORT: CPT | Performed by: INTERNAL MEDICINE

## 2024-09-27 RX ORDER — FLUTICASONE PROPIONATE 50 MCG
1 SPRAY, SUSPENSION (ML) NASAL DAILY
Status: DISCONTINUED | OUTPATIENT
Start: 2024-09-27 | End: 2024-09-29 | Stop reason: HOSPADM

## 2024-09-27 RX ORDER — FAMOTIDINE 20 MG/1
40 TABLET, FILM COATED ORAL
Status: DISCONTINUED | OUTPATIENT
Start: 2024-09-27 | End: 2024-09-29 | Stop reason: HOSPADM

## 2024-09-27 RX ORDER — SODIUM CHLORIDE, SODIUM GLUCONATE, SODIUM ACETATE, POTASSIUM CHLORIDE, MAGNESIUM CHLORIDE, SODIUM PHOSPHATE, DIBASIC, AND POTASSIUM PHOSPHATE .53; .5; .37; .037; .03; .012; .00082 G/100ML; G/100ML; G/100ML; G/100ML; G/100ML; G/100ML; G/100ML
100 INJECTION, SOLUTION INTRAVENOUS CONTINUOUS
Status: DISPENSED | OUTPATIENT
Start: 2024-09-27 | End: 2024-09-28

## 2024-09-27 RX ORDER — TACROLIMUS 0.5 MG/1
2 CAPSULE ORAL 2 TIMES DAILY
Status: DISCONTINUED | OUTPATIENT
Start: 2024-09-27 | End: 2024-09-29 | Stop reason: HOSPADM

## 2024-09-27 RX ORDER — BISACODYL 5 MG/1
5 TABLET, DELAYED RELEASE ORAL
Status: DISCONTINUED | OUTPATIENT
Start: 2024-09-27 | End: 2024-09-29 | Stop reason: HOSPADM

## 2024-09-27 RX ORDER — SODIUM CHLORIDE, SODIUM GLUCONATE, SODIUM ACETATE, POTASSIUM CHLORIDE, MAGNESIUM CHLORIDE, SODIUM PHOSPHATE, DIBASIC, AND POTASSIUM PHOSPHATE .53; .5; .37; .037; .03; .012; .00082 G/100ML; G/100ML; G/100ML; G/100ML; G/100ML; G/100ML; G/100ML
1000 INJECTION, SOLUTION INTRAVENOUS ONCE
Status: COMPLETED | OUTPATIENT
Start: 2024-09-27 | End: 2024-09-27

## 2024-09-27 RX ORDER — INSULIN LISPRO 100 [IU]/ML
2 INJECTION, SOLUTION INTRAVENOUS; SUBCUTANEOUS
Status: DISCONTINUED | OUTPATIENT
Start: 2024-09-27 | End: 2024-09-29 | Stop reason: HOSPADM

## 2024-09-27 RX ORDER — GUAIFENESIN/DEXTROMETHORPHAN 100-10MG/5
10 SYRUP ORAL EVERY 4 HOURS PRN
Status: DISCONTINUED | OUTPATIENT
Start: 2024-09-27 | End: 2024-09-29 | Stop reason: HOSPADM

## 2024-09-27 RX ORDER — ACETAMINOPHEN 10 MG/ML
1000 INJECTION, SOLUTION INTRAVENOUS ONCE
Status: COMPLETED | OUTPATIENT
Start: 2024-09-27 | End: 2024-09-27

## 2024-09-27 RX ORDER — FLUCONAZOLE 100 MG/1
400 TABLET ORAL DAILY
Status: DISCONTINUED | OUTPATIENT
Start: 2024-09-27 | End: 2024-09-28

## 2024-09-27 RX ORDER — DRONABINOL 2.5 MG/1
5 CAPSULE ORAL 3 TIMES DAILY
Status: DISCONTINUED | OUTPATIENT
Start: 2024-09-27 | End: 2024-09-29 | Stop reason: HOSPADM

## 2024-09-27 RX ORDER — PREDNISONE 10 MG/1
10 TABLET ORAL DAILY
Status: DISCONTINUED | OUTPATIENT
Start: 2024-09-27 | End: 2024-09-27

## 2024-09-27 RX ORDER — OXYBUTYNIN CHLORIDE 5 MG/1
15 TABLET, EXTENDED RELEASE ORAL
Status: DISCONTINUED | OUTPATIENT
Start: 2024-09-27 | End: 2024-09-29 | Stop reason: HOSPADM

## 2024-09-27 RX ORDER — PREGABALIN 75 MG/1
150 CAPSULE ORAL 2 TIMES DAILY
Status: DISCONTINUED | OUTPATIENT
Start: 2024-09-27 | End: 2024-09-29 | Stop reason: HOSPADM

## 2024-09-27 RX ORDER — OXYCODONE HYDROCHLORIDE 5 MG/1
5 TABLET ORAL EVERY 4 HOURS PRN
Status: DISCONTINUED | OUTPATIENT
Start: 2024-09-27 | End: 2024-09-29 | Stop reason: HOSPADM

## 2024-09-27 RX ORDER — ALPRAZOLAM 0.5 MG
1 TABLET ORAL
Status: DISCONTINUED | OUTPATIENT
Start: 2024-09-27 | End: 2024-09-29 | Stop reason: HOSPADM

## 2024-09-27 RX ORDER — INSULIN GLARGINE 100 [IU]/ML
12 INJECTION, SOLUTION SUBCUTANEOUS EVERY MORNING
Status: DISCONTINUED | OUTPATIENT
Start: 2024-09-28 | End: 2024-09-29 | Stop reason: HOSPADM

## 2024-09-27 RX ORDER — INSULIN LISPRO 100 [IU]/ML
5 INJECTION, SOLUTION INTRAVENOUS; SUBCUTANEOUS
Status: DISCONTINUED | OUTPATIENT
Start: 2024-09-27 | End: 2024-09-27

## 2024-09-27 RX ORDER — ALBUTEROL SULFATE 0.83 MG/ML
5 SOLUTION RESPIRATORY (INHALATION)
Status: DISCONTINUED | OUTPATIENT
Start: 2024-09-27 | End: 2024-09-29 | Stop reason: HOSPADM

## 2024-09-27 RX ORDER — OXYCODONE HYDROCHLORIDE 10 MG/1
10 TABLET ORAL EVERY 4 HOURS PRN
Status: DISCONTINUED | OUTPATIENT
Start: 2024-09-27 | End: 2024-09-29 | Stop reason: HOSPADM

## 2024-09-27 RX ORDER — ATOVAQUONE 750 MG/5ML
1500 SUSPENSION ORAL DAILY
Status: DISCONTINUED | OUTPATIENT
Start: 2024-09-27 | End: 2024-09-29 | Stop reason: HOSPADM

## 2024-09-27 RX ORDER — INSULIN GLARGINE-YFGN 100 [IU]/ML
12 INJECTION, SOLUTION SUBCUTANEOUS EVERY MORNING
Status: DISCONTINUED | OUTPATIENT
Start: 2024-09-28 | End: 2024-09-27

## 2024-09-27 RX ORDER — VALACYCLOVIR HYDROCHLORIDE 500 MG/1
500 TABLET, FILM COATED ORAL DAILY
Status: DISCONTINUED | OUTPATIENT
Start: 2024-09-27 | End: 2024-09-29 | Stop reason: HOSPADM

## 2024-09-27 RX ORDER — RIBOFLAVIN (VITAMIN B2) 100 MG
100 TABLET ORAL DAILY
Status: DISCONTINUED | OUTPATIENT
Start: 2024-09-27 | End: 2024-09-27

## 2024-09-27 RX ORDER — ACETAMINOPHEN 325 MG/1
650 TABLET ORAL EVERY 6 HOURS PRN
Status: DISCONTINUED | OUTPATIENT
Start: 2024-09-27 | End: 2024-09-29 | Stop reason: HOSPADM

## 2024-09-27 RX ORDER — HYDROMORPHONE HCL/PF 1 MG/ML
0.5 SYRINGE (ML) INJECTION ONCE
Status: COMPLETED | OUTPATIENT
Start: 2024-09-27 | End: 2024-09-27

## 2024-09-27 RX ORDER — TAMSULOSIN HYDROCHLORIDE 0.4 MG/1
0.4 CAPSULE ORAL
Status: DISCONTINUED | OUTPATIENT
Start: 2024-09-27 | End: 2024-09-29 | Stop reason: HOSPADM

## 2024-09-27 RX ORDER — AZITHROMYCIN 500 MG/1
250 TABLET, FILM COATED ORAL 3 TIMES WEEKLY
Status: DISCONTINUED | OUTPATIENT
Start: 2024-09-27 | End: 2024-09-29 | Stop reason: HOSPADM

## 2024-09-27 RX ORDER — POLYETHYLENE GLYCOL 3350 17 G/17G
17 POWDER, FOR SOLUTION ORAL 2 TIMES DAILY
Status: DISCONTINUED | OUTPATIENT
Start: 2024-09-27 | End: 2024-09-29 | Stop reason: HOSPADM

## 2024-09-27 RX ORDER — ALBUTEROL SULFATE 90 UG/1
1 INHALANT RESPIRATORY (INHALATION) EVERY 4 HOURS PRN
Status: DISCONTINUED | OUTPATIENT
Start: 2024-09-27 | End: 2024-09-29 | Stop reason: HOSPADM

## 2024-09-27 RX ORDER — INSULIN LISPRO 100 [IU]/ML
1-5 INJECTION, SOLUTION INTRAVENOUS; SUBCUTANEOUS
Status: DISCONTINUED | OUTPATIENT
Start: 2024-09-27 | End: 2024-09-29 | Stop reason: HOSPADM

## 2024-09-27 RX ORDER — HYDROMORPHONE HCL/PF 1 MG/ML
0.5 SYRINGE (ML) INJECTION EVERY 2 HOUR PRN
Status: DISCONTINUED | OUTPATIENT
Start: 2024-09-27 | End: 2024-09-29 | Stop reason: HOSPADM

## 2024-09-27 RX ORDER — DEXLANSOPRAZOLE 60 MG/1
60 CAPSULE, DELAYED RELEASE ORAL DAILY
Status: DISCONTINUED | OUTPATIENT
Start: 2024-09-27 | End: 2024-09-29 | Stop reason: HOSPADM

## 2024-09-27 RX ADMIN — FLUCONAZOLE 400 MG: 100 TABLET ORAL at 16:28

## 2024-09-27 RX ADMIN — HYDROMORPHONE HYDROCHLORIDE 0.5 MG: 1 INJECTION, SOLUTION INTRAMUSCULAR; INTRAVENOUS; SUBCUTANEOUS at 18:03

## 2024-09-27 RX ADMIN — HYDROMORPHONE HYDROCHLORIDE 0.5 MG: 1 INJECTION, SOLUTION INTRAMUSCULAR; INTRAVENOUS; SUBCUTANEOUS at 13:05

## 2024-09-27 RX ADMIN — HYDROMORPHONE HYDROCHLORIDE 0.5 MG: 1 INJECTION, SOLUTION INTRAMUSCULAR; INTRAVENOUS; SUBCUTANEOUS at 15:07

## 2024-09-27 RX ADMIN — ACETAMINOPHEN 1000 MG: 1000 INJECTION, SOLUTION INTRAVENOUS at 08:41

## 2024-09-27 RX ADMIN — FAMOTIDINE 40 MG: 20 TABLET, FILM COATED ORAL at 20:42

## 2024-09-27 RX ADMIN — OXYCODONE HYDROCHLORIDE 10 MG: 10 TABLET ORAL at 20:41

## 2024-09-27 RX ADMIN — BISACODYL 5 MG: 5 TABLET, COATED ORAL at 20:41

## 2024-09-27 RX ADMIN — INSULIN LISPRO 2 UNITS: 100 INJECTION, SOLUTION INTRAVENOUS; SUBCUTANEOUS at 17:12

## 2024-09-27 RX ADMIN — TACROLIMUS 2 MG: 0.5 CAPSULE ORAL at 17:16

## 2024-09-27 RX ADMIN — ALBUTEROL SULFATE 5 MG: 2.5 SOLUTION RESPIRATORY (INHALATION) at 20:55

## 2024-09-27 RX ADMIN — PANCRELIPASE 48000 UNITS: 120000; 24000; 76000 CAPSULE, DELAYED RELEASE PELLETS ORAL at 17:09

## 2024-09-27 RX ADMIN — APIXABAN 5 MG: 5 TABLET, FILM COATED ORAL at 17:20

## 2024-09-27 RX ADMIN — OXYBUTYNIN CHLORIDE 15 MG: 5 TABLET, EXTENDED RELEASE ORAL at 20:42

## 2024-09-27 RX ADMIN — PREGABALIN 150 MG: 75 CAPSULE ORAL at 17:17

## 2024-09-27 RX ADMIN — SODIUM CHLORIDE, SODIUM GLUCONATE, SODIUM ACETATE, POTASSIUM CHLORIDE, MAGNESIUM CHLORIDE, SODIUM PHOSPHATE, DIBASIC, AND POTASSIUM PHOSPHATE 1000 ML: .53; .5; .37; .037; .03; .012; .00082 INJECTION, SOLUTION INTRAVENOUS at 10:02

## 2024-09-27 RX ADMIN — VALACYCLOVIR HYDROCHLORIDE 500 MG: 500 TABLET, FILM COATED ORAL at 16:28

## 2024-09-27 RX ADMIN — SODIUM CHLORIDE, SODIUM GLUCONATE, SODIUM ACETATE, POTASSIUM CHLORIDE, MAGNESIUM CHLORIDE, SODIUM PHOSPHATE, DIBASIC, AND POTASSIUM PHOSPHATE 1000 ML: .53; .5; .37; .037; .03; .012; .00082 INJECTION, SOLUTION INTRAVENOUS at 08:55

## 2024-09-27 RX ADMIN — AZITHROMYCIN 250 MG: 500 TABLET, FILM COATED ORAL at 16:37

## 2024-09-27 RX ADMIN — B-COMPLEX W/ C & FOLIC ACID TAB 1 TABLET: TAB at 16:34

## 2024-09-27 RX ADMIN — ATOVAQUONE 1500 MG: 750 SUSPENSION ORAL at 17:00

## 2024-09-27 RX ADMIN — PREDNISONE 15 MG: 5 TABLET ORAL at 16:27

## 2024-09-27 RX ADMIN — SODIUM CHLORIDE, SODIUM GLUCONATE, SODIUM ACETATE, POTASSIUM CHLORIDE, MAGNESIUM CHLORIDE, SODIUM PHOSPHATE, DIBASIC, AND POTASSIUM PHOSPHATE 100 ML/HR: .53; .5; .37; .037; .03; .012; .00082 INJECTION, SOLUTION INTRAVENOUS at 18:03

## 2024-09-27 RX ADMIN — CEFTRIAXONE SODIUM 2000 MG: 10 INJECTION, POWDER, FOR SOLUTION INTRAVENOUS at 09:55

## 2024-09-27 RX ADMIN — HYDROMORPHONE HYDROCHLORIDE 0.5 MG: 1 INJECTION, SOLUTION INTRAMUSCULAR; INTRAVENOUS; SUBCUTANEOUS at 11:11

## 2024-09-27 RX ADMIN — OXYCODONE HYDROCHLORIDE 10 MG: 10 TABLET ORAL at 16:29

## 2024-09-27 RX ADMIN — POLYETHYLENE GLYCOL 3350 17 G: 17 POWDER, FOR SOLUTION ORAL at 17:17

## 2024-09-27 RX ADMIN — HYDROMORPHONE HYDROCHLORIDE 0.5 MG: 1 INJECTION, SOLUTION INTRAMUSCULAR; INTRAVENOUS; SUBCUTANEOUS at 09:38

## 2024-09-27 RX ADMIN — HYDROMORPHONE HYDROCHLORIDE 0.5 MG: 1 INJECTION, SOLUTION INTRAMUSCULAR; INTRAVENOUS; SUBCUTANEOUS at 21:39

## 2024-09-27 RX ADMIN — ALPRAZOLAM 1 MG: 0.5 TABLET ORAL at 23:18

## 2024-09-27 RX ADMIN — CYANOCOBALAMIN TAB 500 MCG 1000 MCG: 500 TAB at 16:35

## 2024-09-27 RX ADMIN — TAMSULOSIN HYDROCHLORIDE 0.4 MG: 0.4 CAPSULE ORAL at 16:33

## 2024-09-27 RX ADMIN — Medication 5000 UNITS: at 16:36

## 2024-09-27 RX ADMIN — FLUTICASONE PROPIONATE 1 SPRAY: 50 SPRAY, METERED NASAL at 11:11

## 2024-09-27 NOTE — ASSESSMENT & PLAN NOTE
Status post stent placement on 9/20 SLB  ED contacted urology, they said she could stay at [facility] given stent appears to be in proper place  Urology consulted, appreciate recommendations

## 2024-09-27 NOTE — ASSESSMENT & PLAN NOTE
Lab Results   Component Value Date    HGBA1C 7.5 (H) 09/21/2024       Recent Labs     09/25/24  1613 09/25/24  2120 09/26/24  0726 09/26/24  1100   POCGLU 205* 124 115 126       Blood Sugar Average: Last 72 hrs:    Home regimen: Basal insulin 12 units every morning.  Patient unsure bolus regimen given her prednisone has been being tapered.    Plan  Continue home basal insulin  5 units lispro with meals plus sliding scale for now

## 2024-09-27 NOTE — ASSESSMENT & PLAN NOTE
As above, concern for UTI in the setting of fever, recent urologic procedure, abnormal urinalysis.  No hydronephrosis or abscess on CT.  Recent urine cultures isolated Candida albicans/glabrata and mixed contaminants.  Of note, patient was previously on posaconazole suppression.   Empiric antimicrobials, as above.   Follow-up pending urine and blood cultures.

## 2024-09-27 NOTE — SEPSIS NOTE
Sepsis Note   Maximilian Mann 48 y.o. female MRN: [id number]  Unit/Bed#: ED 25 Encounter: [id number]       Initial Sepsis Screening       Row Name 09/27/24 0849                Is the patient's history suggestive of a new or worsening infection? Yes (Proceed)  -ZS        Suspected source of infection urinary tract infection  -ZS        Indicate SIRS criteria Hyperthemia > 38.3C (100.9F) OR Hypothermia <36C (96.8F);Tachycardia > 90 bpm  -ZS        Are two or more of the above signs & symptoms of infection both present and new to the patient? Yes (Proceed)  -ZS        Assess for evidence of organ dysfunction: Are any of the below criteria present within 6 hours of suspected infection and SIRS criteria that are NOT considered to be chronic conditions? --                  User Key  (r) = Recorded By, (t) = Taken By, (c) = Cosigned By      Initials Name Provider Type    WAYNE Burks Nurse Practitioner                        Body mass index is 23.75 kg/m².  Wt Readings from Last 1 Encounters:   09/27/24 58.9 kg (129 lb 13.6 oz)        Ideal body weight: 50.1 kg (110 lb 7.2 oz)  Adjusted ideal body weight: 53.6 kg (118 lb 3.4 oz)

## 2024-09-27 NOTE — ASSESSMENT & PLAN NOTE
Secondary to lung transplant at [facility].    Continue home tacrolimus 2 mg twice daily, prednisone 15 mg daily  Continue home prophylactic meds: Azithromycin Monday Wednesday Friday, valacyclovir daily, atovaquone daily

## 2024-09-27 NOTE — CONSULTS
Consultation - Infectious Disease   Name: Maximilian Mann 48 y.o. female I MRN: [id number]  Unit/Bed#: ED 25 I Date of Admission: 9/27/2024   Date of Service: 9/27/2024 I Hospital Day: 0   Inpatient consult to Infectious Diseases  Consult performed by: Vesta Galindo DO  Consult ordered by: Raymundo Sorenson DO        Physician Requesting Evaluation: Ghassan Almonte,*   Reason for Evaluation / Principal Problem: Fevers, UTI    Assessment & Plan  Sepsis ([facility])  Fever, tachycardia, leukocytosis.  Consider urinary source in the setting of recent urologic procedure, abnormal urinalysis. No other potential foci of infection visualized on CT abdomen/pelvis. No clinical or radiographic evidence to suggest new lung infection. Negative COVID-19/influenza.  Patient is hemodynamically stable, nontoxic in appearance.   Change ceftriaxone to cefepime for now pending cultures.   Add high dose renally adjusted fluconazole 400 mg daily for now.   Follow-up pending blood cultures.  Follow-up urine culture.  If patient does not improve on current antimicrobials and/or yeast is isolated on blood culture, would add empiric micafungin.  Follow temperatures and hemodynamics.  Recheck CBC in AM to assess for treatment response.  Pyelonephritis  As above, concern for UTI in the setting of fever, recent urologic procedure, abnormal urinalysis.  No hydronephrosis or abscess on CT.  Recent urine cultures isolated Candida albicans/glabrata and mixed contaminants.  Of note, patient was previously on posaconazole suppression.   Empiric antimicrobials, as above.   Follow-up pending urine and blood cultures.  Renal calculus, left  Status post left ureteral stent placement and basket stone extraction at [facility] 9/20/2024.  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 abscessus/boletti colonization, right pneumonectomy 10/2022. Current immunosuppresive regimen is tacrolimus and prednisone.  Managed by [facility]. Tal[facility].   Continue chronic prophylaxis with atovaquone, valacyclovir, azithromycin.  Elevated serum creatinine  Dose adjust antimicrobials based on renal function.   Recheck BMP in AM.   Volume management per primary service.  Type 1 diabetes mellitus with diabetic chronic kidney disease, unspecified CKD stage (HCC)  With long-term insulin use and hemoglobin A1c of 7.5. Risk factor for infection.  Pancreatic insufficiency      Antibiotic 1  Ceftriaxone    I personally reviewed prior hospitalization records including from outside facility.  I discussed above plan with the patient, and with primary service who agrees with change to cefepime in addition to initiation of empiric antifungal.  I also discussed above plan with ID pharmacy.  Thank you for the consultation.  Please contact our service with any immediate questions over the weekend.        History of Present Illness   Maximilian Mann is a 48 y.o. 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  Patient also has pancreatic insufficiency, diabetes mellitus, chronic pain, CKD 3, anxiety.  She is maintained on chronic atovaquone, azithromycin and Valtrex for prophylaxis managed by [facility]. Talihina.  Of note, patient was previously on posaconazole prophylaxis.    Most recently, patient was hospitalized at [facility] from 9/20 to 9/26 after presenting with left flank pain found to have left renal pelvic calculus for which she  underwent ureteral stent placement and basket stone extraction on 9/20.  Patient did have a fever on 9/22 felt to be a possible viral URI as patient was complaining of sore throat as otherwise blood cultures were negative.  Urine culture from 9/22 isolated mixed contaminants and initial urine culture from 9/20 isolated Candida species.  Patient currently denies worsening respiratory symptoms.  She still has left-sided flank pain with blood-tinged urine.    Review of Systems  I have reviewed the patient's PMH, PSH, Social History, Family History, Meds, and Allergies    Objective      Temp:  [99.5 °F (37.5 °C)-101.8 °F (38.8 °C)] 99.5 °F (37.5 °C)  HR:  [] 73  Resp:  [16-22] 22  BP: ()/(53-70) 119/63  O2 Device: None (Room air)          I/O last 24 hours:  In: 2150 [I.V.:2000; IV Piggyback:150]  Out: -   Lines/Drains/Airways       Active Status       Name Placement date Placement time Site Days    Port A Cath 11/25/22 Right Chest 11/25/22  0953  Chest  672    Port A Cath 11/25/22 Right Chest 11/25/22  1201  Chest  672                  Physical Exam   General: Awake, fatigued, flushed, nontoxic  Eyes:  Conjunctive clear with no hemorrhages or effusions  Oropharynx:  No ulcers, no lesions  Neck:  Supple, trachea midline  Lungs:  Clear to auscultation bilaterally, no accessory muscle use  Cardiac:  Regular rate and rhythm, no murmurs  Abdomen:  Soft, non-tender, no rigidity or guarding, left CVA tenderness  Extremities:  No peripheral cyanosis, clubbing, or edema  Skin:  No visible rashes or draining wounds  Neurological:  Moves all four extremities spontaneously      Lab Results: I have reviewed the following results:   Recent Labs     09/27/24  0840   WBC 13.45*   HGB 9.9*   HCT 32.5*      SODIUM 137   K 4.5      CO2 28   BUN 46*   CREATININE 1.32*   GLUC 128   AST 13   ALT 12   ALB 3.3*   TBILI 0.34   ALKPHOS 59   PTT >210*   INR 1.37*   LACTICACID 1.1     Micro:  Lab Results   Component Value  Date    BLOODCX No Growth at 48 hrs. 09/25/2024    BLOODCX No Growth After 5 Days. 09/22/2024    BLOODCX No Growth After 5 Days. 09/22/2024    URINECX 40,000-49,000 cfu/ml 09/22/2024    URINECX 60,000-69,000 cfu/ml Candida albicans (A) 09/20/2024    URINECX 50,000-59,000 cfu/ml Candida glabrata (A) 09/20/2024    WOUNDCULT 2+ Growth of Serratia marcescens (A) 03/28/2023    WOUNDCULT 2+ Growth of Staphylococcus aureus (A) 03/28/2023    WOUNDCULT 2+ Growth of Beta Hemolytic Streptococcus Group B (A) 03/28/2023    WOUNDCULT 1+ Growth of 03/28/2023    SPUTUMCULTUR 1+ Growth of Pseudomonas aeruginosa (A) 05/05/2023    SPUTUMCULTUR 1+ Growth of Staphylococcus aureus (A) 05/05/2023    SPUTUMCULTUR 1+ Growth of Pseudomonas aeruginosa (A) 05/05/2023    SPUTUMCULTUR 1+ Growth of 05/05/2023     Imaging Review: Personally reviewed the following image studies in PACS and associated radiology reports: CT abdomen/pelvis. My interpretation of the radiology images/reports is: Left-sided nephrolithiasis with upper collecting infundibular stones and layering stones in urinary bladder.  Bladder stones are new having migrated from upper collecting system since prior.  No hydronephrosis..  Other Studies: No additional pertinent studies reviewed.

## 2024-09-27 NOTE — ASSESSMENT & PLAN NOTE
Fever, tachycardia, leukocytosis  Suspected source: UTI  Recent instrumentation with stent placement on left ureter for nephrolithiasis  Most recent urine cultures growing only Candida species    Plan  Infectious disease consult given immunocompromise status and recent urine cultures, appreciate recommendations  Isolyte at 100 cc/h  Cefepime empirically for now  Discussed with her transplant team at [facility] - recommended avoiding stress dose steroids unless further decompensation

## 2024-09-27 NOTE — TELEPHONE ENCOUNTER
----- Message from Barbara SWAN sent at 9/27/2024  8:24 AM EDT -----  Patient is being discharged from their inpatient hospitalization today. Patients unplanned readmission score is red or yellow (meaning that they are at high risk of readmission) and require a TCM appointment within 3-5 days of discharge. Please contact this patient to schedule appointment.    Thank you    Inpatient Care Management

## 2024-09-27 NOTE — ASSESSMENT & PLAN NOTE
Dose adjust antimicrobials based on renal function.   Recheck BMP in AM.   Volume management per primary service.

## 2024-09-27 NOTE — ASSESSMENT & PLAN NOTE
Fever, tachycardia, leukocytosis.  Consider urinary source in the setting of recent urologic procedure, abnormal urinalysis. No other potential foci of infection visualized on CT abdomen/pelvis. No clinical or radiographic evidence to suggest new lung infection. Negative COVID-19/influenza.  Patient is hemodynamically stable, nontoxic in appearance.   Change ceftriaxone to cefepime for now pending cultures.   Add high dose renally adjusted fluconazole 400 mg daily for now.   Follow-up pending blood cultures.  Follow-up urine culture.  If patient does not improve on current antimicrobials and/or yeast is isolated on blood culture, would add empiric micafungin.  Follow temperatures and hemodynamics.  Recheck CBC in AM to assess for treatment response.

## 2024-09-27 NOTE — ED PROVIDER NOTES
Final diagnoses:   UTI (urinary tract infection)   Hx of lung transplant (HCC)     ED Disposition       ED Disposition   Admit    Condition   Stable    Date/Time   Fri Sep 27, 2024  1:24 PM    Comment                  Assessment & Plan       Medical Decision Making  Abdominal exam is without peritoneal signs.  No evidence of acute abdomen at this time.   Given workup, low suspicion for acute biliary disease, acute pancreatitis, acute infectious process to include but not limited to pneumonia, pyelonephritis, appendicitis.  Presentation not consistent with other acute, emergent causes for abdominal pain at this time.  CBC shows no leukocytosis, anemia.  CMP negative for metabolic derangements.  Lipase negative for pancreatitis.  Urinalysis positive for UTI.  Rocephin given.  Patient was given 2 L normal saline.  Patient given multiple doses of pain medication for colic pain of her left flank.  Patient's case discussed with urology who stated that she is able to stay at [facility] due to not having any interventions required.  Stent is in place.  Spoke with [facility] and was, WAYNE and Dr. Tessie moon with patient staying at [facility] for IV antibiotics for urinary tract infection.  Discussed with [facility] internal medicine and patient was accepted for further evaluation.    Amount and/or Complexity of Data Reviewed  Labs: ordered. Decision-making details documented in ED Course.  Radiology: ordered. Decision-making details documented in ED Course.    Risk  Prescription drug management.  Decision regarding hospitalization.        ED Course as of 09/27/24 1432   Fri Sep 27, 2024   0903 CBC and differential(!)  Hb 9.9- baseline  WBC 13.45- increased from baseline   1012 CT chest abdomen pelvis wo contrast     IMPRESSION:     1.  Left-sided nephrolithiasis with upper collecting infundibular stones and layering stones in the urinary bladder. Bladder stones are new having migrated from the upper collecting  system since prior. No stones projecting along the course of the   appropriately positioned left ureteral stent. No hydronephrosis.     2.  Mild focal bronchiolitis at the left lower lobe. No pneumonic consolidation.        1035 Called and spoke with YAYA Mcdonnell about patient's case.  Left voicemail waiting on recommendations.   1037 Also spoke with urology on-call about patient's case due to recent lithotripsy with [facility].  Verifying patient able to be admitted to [facility] internal medicine team at [facility].  Awaiting recommendations.   1108 Urology stated that the patient would be able to stay at [facility], still awaiting YAYA Mcdonnell for disposition.   1153 Called for a second time to [facility] lung transplant team.  Left another voicemail with office and spoke with the unit clerk who is trying to get a hold of the providers to return my phone call about disposition on patient.   1257 Spoke with Maggie who is a part of her transplant team at [facility].  WasWAYNE recommends transfer to [facility] and at minimum be admitted for IV antibiotics at [facility] and [facility].  Provider will speak with patient directly as well as transplant attending for confirmation of disposition.       Medications   fluticasone (FLONASE) 50 mcg/act nasal spray 1 spray (1 spray Each Nare Given 9/27/24 1111)   albuterol inhalation solution 5 mg (has no administration in time range)   albuterol (PROVENTIL HFA,VENTOLIN HFA) inhaler 1 puff (has no administration in time range)   ALPRAZolam (XANAX) tablet 1 mg (has no administration in time range)   apixaban (ELIQUIS) tablet 5 mg (has no administration in time range)   vitamin C tablet 100 mg (has no administration in time range)   atovaquone (MEPRON) oral suspension 1,500 mg (has no administration in time range)   azithromycin (ZITHROMAX) tablet 250 mg (has no administration in time range)   bisacodyl (DULCOLAX) EC tablet 5 mg (has no  administration in time range)   Vitamin D3 TABS 5,000 Units (has no administration in time range)   dexlansoprazole (DEXILANT) capsule 60 mg (has no administration in time range)   dronabinol (MARINOL) capsule 5 mg (has no administration in time range)   famotidine (PEPCID) tablet 40 mg (has no administration in time range)   insulin lispro (HumALOG/ADMELOG) 100 units/mL subcutaneous injection 5 Units (has no administration in time range)   Insulin Glargine-yfgn SOPN 12 Units (has no administration in time range)   multivitamin stress formula tablet 1 tablet (has no administration in time range)   oxybutynin (DITROPAN-XL) 24 hr tablet 15 mg (has no administration in time range)   pancrelipase (Lip-Prot-Amyl) (CREON) delayed release capsule 48,000 Units (has no administration in time range)   polyethylene glycol (MIRALAX) packet 17 g (has no administration in time range)   pregabalin (LYRICA) capsule 150 mg (has no administration in time range)   dextromethorphan-guaiFENesin (ROBITUSSIN DM) oral syrup 10 mL (has no administration in time range)   tacrolimus (PROGRAF) capsule 2 mg (has no administration in time range)   tamsulosin (FLOMAX) capsule 0.4 mg (has no administration in time range)   valACYclovir (VALTREX) tablet 500 mg (has no administration in time range)   cyanocobalamin (VITAMIN B-12) tablet 1,000 mcg (has no administration in time range)   multi-electrolyte (PLASMALYTE-A/ISOLYTE-S PH 7.4) IV solution (has no administration in time range)   predniSONE tablet 15 mg (has no administration in time range)   oxyCODONE (ROXICODONE) IR tablet 5 mg (has no administration in time range)     Or   oxyCODONE (ROXICODONE) immediate release tablet 10 mg (has no administration in time range)   HYDROmorphone (DILAUDID) injection 0.5 mg (has no administration in time range)   acetaminophen (TYLENOL) tablet 650 mg (has no administration in time range)   ceftriaxone (ROCEPHIN) 2 g/50 mL in dextrose IVPB (0 mg Intravenous  Stopped 9/27/24 1032)   multi-electrolyte (PLASMALYTE-A/ISOLYTE-S PH 7.4) IV solution 1,000 mL (0 mL Intravenous Stopped 9/27/24 0958)     Followed by   multi-electrolyte (PLASMALYTE-A/ISOLYTE-S PH 7.4) IV solution 1,000 mL (0 mL Intravenous Stopped 9/27/24 1110)   acetaminophen (Ofirmev) injection 1,000 mg (0 mg Intravenous Stopped 9/27/24 0937)   HYDROmorphone (DILAUDID) injection 0.5 mg (0.5 mg Intravenous Given 9/27/24 0938)   HYDROmorphone (DILAUDID) injection 0.5 mg (0.5 mg Intravenous Given 9/27/24 1111)   HYDROmorphone (DILAUDID) injection 0.5 mg (0.5 mg Intravenous Given 9/27/24 1305)       ED Risk Strat Scores                           SBIRT 20yo+      Flowsheet Row Most Recent Value   NICOLE: How many times in the past year have you...    Used an illegal drug or used a prescription medication for non-medical reasons? Never Filed at: 09/27/2024 0751                            History of Present Illness       Chief Complaint   Patient presents with    Fever     Pt reports that she was just discharged from [facility] yesterday for kidney stones, now has a fever of 101.8       Past Medical History:   Diagnosis Date    Abnormal uterine bleeding (AUB)     ABPA (allergic bronchopulmonary aspergillosis) ([facility]) 07/08/2012    Acute kidney injury (HCC) 09/08/2017    Acute on chronic respiratory failure ([facility]) 03/04/2017    Last Assessment & Plan:  Formatting of this note might be different from the original. Recurrent with clinical worsening with enlarging PTX, s/p multiple chest tubes, but new fevers 6/19 and worsening leukocytosis indicating infection as cause of 6/19 decompensation with profound hypoxia in setting of shunting through right lung and continued pneumothorax s/p 2nd chest tube placement.  6/27: Right    Anaphylaxis 06/06/2022    Anemia     Asthma     Chest pain 09/21/2021    Chronic pain     Chronic respiratory failure with hypoxia (HCC) 09/07/2021    COVID-19 06/03/2022    Cystic fibrosis (HCC)     Deep vein  thrombosis (HCC)     Diabetes mellitus (HCC)     GERD (gastroesophageal reflux disease)     HBP (high blood pressure)     HCAP (healthcare-associated pneumonia) 03/06/2020    History of transfusion     Influenza B 02/23/2020    Kidney stone     Neutropenic fever  (HCC) 09/26/2020    Pneumonia due to Pseudomonas species (HCC) 10/28/2010    Last Assessment & Plan:  Formatting of this note might be different from the original. Polymicrobial pna w/ pseudomonas, M. Abscessus, Cadida glabrata/albicans    PONV (postoperative nausea and vomiting)     Pseudomonas aeruginosa infection 09/20/2012    Renal calculus     Squamous cell skin cancer 01/12/2023    SCCIS- Left upper thigh    Transplant recipient     Lung- 2013, 2017    Ulcerative colitis (HCC)     Wellness examination 02/04/2013    Formatting of this note might be different from the original. Discussed with patient and acknowledgement form signed on 2/4/2013 (ALYSHA)      Past Surgical History:   Procedure Laterality Date    APPENDECTOMY      AUGMENTATION MAMMAPLASTY Bilateral 2004    AUGMENTATION MAMMAPLASTY Right 2021    removed     BREAST SURGERY      Aug.     BRONCHOSCOPY      CHOLECYSTECTOMY      COLONOSCOPY      EGD  07/2019    Yeast Infect.     FL RETROGRADE PYELOGRAM  01/09/2020    FL RETROGRADE PYELOGRAM  02/04/2020    FL RETROGRADE PYELOGRAM  9/20/2024    GASTROSCOPY  03/01/2024    GASTROSTOMY TUBE, PLACE      IR BALLOON-OCCLUDED ANTEGRADE TRANSVENOUS OBLITERATION (BATO)  11/22/2022    IR PORT PLACEMENT  03/22/2022    IR PORT REMOVAL  01/10/2022    IR PORT STRIPPING  11/25/2022    IR TUNNELED CENTRAL LINE PLACEMENT  01/13/2022    LUNG SURGERY      LUNG TRANSPLANT      X2    NV CYSTO BLADDER W/URETERAL CATHETERIZATION Right 01/09/2020    Procedure: CYSTOSCOPY RETROGRADE PYELOGRAM WITH INSERTION STENT URETERAL;  Surgeon: Patricio Perry MD;  Location: [facility];  Service: Urology    NV CYSTO/URETERO W/LITHOTRIPSY &INDWELL STENT INSRT Right 02/04/2020     Procedure: CYSTOSCOPY URETEROSCOPY WITH LITHOTRIPSY HOLMIUM LASER, RETROGRADE PYELOGRAM AND INSERTION STENT URETERAL;  Surgeon: Patricio Perry MD;  Location: [facility];  Service: Urology    VT CYSTO/URETERO W/LITHOTRIPSY &INDWELL STENT INSRT Left 2024    Procedure: CYSTOSCOPY URETEROSCOPY WITH LITHOTRIPSY HOLMIUM LASER, RETROGRADE PYELOGRAM AND INSERTION STENT URETERAL, BASKET STONE EXTRACTION;  Surgeon: Lee Briceno MD;  Location:  MAIN OR;  Service: Urology    SKIN BIOPSY      US GUIDED VASCULAR ACCESS  2017      Family History   Problem Relation Age of Onset    Alcohol abuse Mother     Mental illness Mother     Depression Mother     Alcohol abuse Father     COPD Maternal Grandmother     Lung cancer Maternal Grandmother 65    Cancer Maternal Grandmother     COPD Maternal Grandfather     Cancer Maternal Grandfather     COPD Paternal Grandmother     Lung cancer Paternal Grandmother 65    No Known Problems Sister     No Known Problems Paternal Aunt     Breast cancer Neg Hx     Ovarian cancer Neg Hx     Colon cancer Neg Hx       Social History     Tobacco Use    Smoking status: Former     Current packs/day: 0.00     Average packs/day: 0.5 packs/day for 12.0 years (6.0 ttl pk-yrs)     Types: Cigarettes     Start date: 1993     Quit date: 2005     Years since quittin.7     Passive exposure: Past    Smokeless tobacco: Never   Vaping Use    Vaping status: Never Used   Substance Use Topics    Alcohol use: Yes     Alcohol/week: 2.0 standard drinks of alcohol     Types: 2 Glasses of wine per week     Comment: social    Drug use: Yes     Frequency: 2.0 times per week     Types: Marijuana     Comment: medical edible marijuana prescribed      E-Cigarette/Vaping    E-Cigarette Use Never User       E-Cigarette/Vaping Substances    Nicotine No     THC No     CBD No     Flavoring No     Other No     Unknown No       I have reviewed and agree with the history as documented.     48-year-old female  patient presents to the ER for evaluation fever. PMH: Type 1 diabetes, history of PE/DVT, cystic fibrosis, GERD, anxiety, hypertension, ulcerative pancolitis, asthma, status post pneumonectomy, lung transplant with [facility].  Patient stated that she was seen and evaluated 1 week ago at [facility] ER and was told that she had a nephrolithiasis.  Patient was transferred to [facility] for a lithotripsy and stent placement.  Patient became febrile status post intervention and was discharged after 3 days of antibiotics.  Patient was discharged yesterday and was told to come back if she presents with a fever.  Patient woke up and had 101 fever.  Patient did not take any medications prior to arrival.  Patient complains of left flank pain.  Pain is colicky in nature.  She did get sent home with oxycodone and has minimally relieved her pain.  Patient tolerating p.o. intake.  No noted shortness of breath, chest pain, dizziness or syncope.  No noted abdominal pain, nausea, vomiting or diarrhea.  Patient has been straining her urine without hematuria.  No noted passing of stone.      History provided by:  Patient      Review of Systems   Constitutional:  Positive for fever. Negative for chills.   HENT:  Negative for ear pain and sore throat.    Eyes:  Negative for pain and visual disturbance.   Respiratory:  Negative for cough and shortness of breath.    Cardiovascular:  Negative for chest pain and palpitations.   Gastrointestinal:  Negative for abdominal pain and vomiting.   Genitourinary:  Positive for flank pain. Negative for dysuria and hematuria.   Musculoskeletal:  Negative for arthralgias and back pain.   Skin:  Negative for color change and rash.   Neurological:  Negative for seizures and syncope.   All other systems reviewed and are negative.          Objective       ED Triage Vitals [09/27/24 0748]   Temperature Pulse Blood Pressure Respirations SpO2 Patient Position - Orthostatic VS   (!) 101.8 °F  (38.8 °C) (!) 106 118/68 20 97 % Lying      Temp Source Heart Rate Source BP Location FiO2 (%) Pain Score    Oral Monitor Right arm -- 6      Vitals      Date and Time Temp Pulse SpO2 Resp BP Pain Score FACES Pain Rating User   09/27/24 1400 -- 73 95 % 22 119/63 -- --    09/27/24 1230 -- 73 92 % 18 105/59 -- --    09/27/24 1211 -- 70 93 % 18 91/56 -- -- FS   09/27/24 1130 99.5 °F (37.5 °C) 72 95 % 18 90/62 -- --    09/27/24 1115 -- 72 95 % 20 109/70 -- -- FS 09/27/24 1111 -- -- -- -- -- 8 --    09/27/24 1100 99.5 °F (37.5 °C) 75 94 % 16 111/56 -- --    09/27/24 1014 99.5 °F (37.5 °C) 73 94 % 21 96/53 -- --    09/27/24 0938 -- -- -- -- -- 8 -- FS 09/27/24 0748 101.8 °F (38.8 °C) 106 97 % 20 118/68 6 -- JLG            Physical Exam  Vitals and nursing note reviewed.   Constitutional:       General: She is not in acute distress.     Appearance: She is well-developed. She is ill-appearing.   HENT:      Head: Normocephalic and atraumatic.      Right Ear: External ear normal.      Left Ear: External ear normal.   Eyes:      Conjunctiva/sclera: Conjunctivae normal.   Cardiovascular:      Rate and Rhythm: Normal rate and regular rhythm.      Pulses: Normal pulses.      Heart sounds: Normal heart sounds.   Pulmonary:      Effort: Pulmonary effort is normal. No respiratory distress.      Breath sounds: Normal breath sounds.   Abdominal:      Palpations: Abdomen is soft.      Tenderness: There is no abdominal tenderness.   Musculoskeletal:         General: No swelling.      Cervical back: Neck supple.   Skin:     General: Skin is warm and dry.      Capillary Refill: Capillary refill takes less than 2 seconds.   Neurological:      Mental Status: She is alert and oriented to person, place, and time. Mental status is at baseline.   Psychiatric:         Mood and Affect: Mood normal.         Behavior: Behavior normal.         Results Reviewed       Procedure Component Value Units Date/Time    Urine Microscopic  [[id number]]  (Abnormal) Collected: 09/27/24 1003    Lab Status: Final result Specimen: Urine, Clean Catch Updated: 09/27/24 1017     RBC, UA Innumerable /hpf      WBC, UA Innumerable /hpf      Epithelial Cells Occasional /hpf      Bacteria, UA None Seen /hpf     Urine culture [[id number]] Collected: 09/27/24 1003    Lab Status: In process Specimen: Urine, Clean Catch Updated: 09/27/24 1017    UA w Reflex to Microscopic w Reflex to Culture [[id number]]  (Abnormal) Collected: 09/27/24 1003    Lab Status: Final result Specimen: Urine, Clean Catch Updated: 09/27/24 1012     Color, UA Dark Yellow     Clarity, UA Turbid     Specific Gravity, UA 1.018     pH, UA 6.0     Leukocytes, UA Large     Nitrite, UA Positive     Protein,  (2+) mg/dl      Glucose, UA Negative mg/dl      Ketones, UA Negative mg/dl      Urobilinogen, UA 2.0 mg/dl      Bilirubin, UA Small     Occult Blood, UA Large    Blood culture #1 [[id number]] Collected: 09/27/24 0940    Lab Status: In process Specimen: Blood from Hand, Right Updated: 09/27/24 1004    Blood culture #2 [[id number]] Collected: 09/27/24 0950    Lab Status: In process Specimen: Blood from Hand, Left Updated: 09/27/24 1004    Protime-INR [[id number]]  (Abnormal) Collected: 09/27/24 0840    Lab Status: Final result Specimen: Blood from Arm, Right Updated: 09/27/24 0936     Protime 17.6 seconds      INR 1.37    Narrative:      INR Therapeutic Range    Indication                                             INR Range      Atrial Fibrillation                                               2.0-3.0  Hypercoagulable State                                    2.0.2.3  Left Ventricular Asist Device                            2.0-3.0  Mechanical Heart Valve                                  -    Aortic(with afib, MI, embolism, HF, LA enlargement,    and/or coagulopathy)                                     2.0-3.0 (2.5-3.5)     Mitral                                                              2.5-3.5  Prosthetic/Bioprosthetic Heart Valve               2.0-3.0  Venous thromboembolism (VTE: VT, PE        2.0-3.0    APTT [[id number]]  (Abnormal) Collected: 09/27/24 0840    Lab Status: Final result Specimen: Blood from Arm, Right Updated: 09/27/24 0936     PTT >210 seconds     Procalcitonin [[id number]]  (Normal) Collected: 09/27/24 0840    Lab Status: Final result Specimen: Blood from Arm, Right Updated: 09/27/24 0920     Procalcitonin 0.19 ng/ml     Comprehensive metabolic panel [[id number]]  (Abnormal) Collected: 09/27/24 0840    Lab Status: Final result Specimen: Blood from Line Updated: 09/27/24 0913     Sodium 137 mmol/L      Potassium 4.5 mmol/L      Chloride 101 mmol/L      CO2 28 mmol/L      ANION GAP 8 mmol/L      BUN 46 mg/dL      Creatinine 1.32 mg/dL      Glucose 128 mg/dL      Calcium 8.7 mg/dL      Corrected Calcium 9.3 mg/dL      AST 13 U/L      ALT 12 U/L      Alkaline Phosphatase 59 U/L      Total Protein 6.6 g/dL      Albumin 3.3 g/dL      Total Bilirubin 0.34 mg/dL      eGFR 48 ml/min/1.73sq m     Narrative:      National Kidney Disease Foundation guidelines for Chronic Kidney Disease (CKD):     Stage 1 with normal or high GFR (GFR > 90 mL/min/1.73 square meters)    Stage 2 Mild CKD (GFR = 60-89 mL/min/1.73 square meters)    Stage 3A Moderate CKD (GFR = 45-59 mL/min/1.73 square meters)    Stage 3B Moderate CKD (GFR = 30-44 mL/min/1.73 square meters)    Stage 4 Severe CKD (GFR = 15-29 mL/min/1.73 square meters)    Stage 5 End Stage CKD (GFR <15 mL/min/1.73 square meters)  Note: GFR calculation is accurate only with a steady state creatinine    FLU/COVID Rapid Antigen (30 min. TAT) - Preferred screening test in ED [[id number]]  (Normal) Collected: 09/27/24 0840    Lab Status: Final result Specimen: Nares from Nose Updated: 09/27/24 0912     SARS COV Rapid Antigen Negative     Influenza A Rapid Antigen Negative     Influenza B Rapid Antigen Negative    Narrative:      This test has been  performed using the RawDataidel Nel 2 FLU+SARS Antigen test under the Emergency Use Authorization (EUA). This test has been validated by the  and verified by the performing laboratory. The Nel uses lateral flow immunofluorescent sandwich assay to detect SARS-COV, Influenza A and Influenza B Antigen.     The Quidel Nel 2 SARS Antigen test does not differentiate between SARS-CoV and SARS-CoV-2.     Negative results are presumptive and may be confirmed with a molecular assay, if necessary, for patient management. Negative results do not rule out SARS-CoV-2 or influenza infection and should not be used as the sole basis for treatment or patient management decisions. A negative test result may occur if the level of antigen in a sample is below the limit of detection of this test.     Positive results are indicative of the presence of viral antigens, but do not rule out bacterial infection or co-infection with other viruses.     All test results should be used as an adjunct to clinical observations and other information available to the provider.    FOR PEDIATRIC PATIENTS - copy/paste COVID Guidelines URL to browser: [email or web address]    Lactic acid [[id number]]  (Normal) Collected: 09/27/24 0840    Lab Status: Final result Specimen: Blood from Vein Updated: 09/27/24 0911     LACTIC ACID 1.1 mmol/L     Narrative:      Result may be elevated if tourniquet was used during collection.    CBC and differential [[id number]]  (Abnormal) Collected: 09/27/24 0840    Lab Status: Final result Specimen: Blood from Line Updated: 09/27/24 0853     WBC 13.45 Thousand/uL      RBC 3.41 Million/uL      Hemoglobin 9.9 g/dL      Hematocrit 32.5 %      MCV 95 fL      MCH 29.0 pg      MCHC 30.5 g/dL      RDW 14.0 %      MPV 10.2 fL      Platelets 170 Thousands/uL      nRBC 0 /100 WBCs      Segmented % 76 %      Immature Grans % 1 %      Lymphocytes % 14 %      Monocytes % 9 %       Eosinophils Relative 0 %      Basophils Relative 0 %      Absolute Neutrophils 10.27 Thousands/µL      Absolute Immature Grans 0.08 Thousand/uL      Absolute Lymphocytes 1.86 Thousands/µL      Absolute Monocytes 1.19 Thousand/µL      Eosinophils Absolute 0.03 Thousand/µL      Basophils Absolute 0.02 Thousands/µL             CT chest abdomen pelvis wo contrast   Final Interpretation by Slick Villanueva MD ([id number])      1.  Left-sided nephrolithiasis with upper collecting infundibular stones and layering stones in the urinary bladder. Bladder stones are new having migrated from the upper collecting system since prior. No stones projecting along the course of the    appropriately positioned left ureteral stent. No hydronephrosis.      2.  Mild focal bronchiolitis at the left lower lobe. No pneumonic consolidation.      Workstation performed: [id number]             Procedures    ED Medication and Procedure Management   Prior to Admission Medications   Prescriptions Last Dose Informant Patient Reported? Taking?   ALPRAZolam (XANAX) 1 mg tablet   No No   Sig: Take 1 tablet (1 mg total) by mouth daily at bedtime as needed for anxiety or sleep   Ascorbic Acid (vitamin C) 100 MG tablet  Self Yes No   Sig: Take 100 mg by mouth daily   BD Pen Needle Abi U/F 32G X 4 MM MISC  Self No No   Si times daily   Cholecalciferol (Vitamin D3) 125 MCG (5000 UT) TABS  Self Yes No   Sig: Take 5,000 Units by mouth daily   Continuous Blood Gluc  (Dexcom G7 ) SUKHI  Self No No   Sig: Use 1 each continuous   Continuous Glucose Sensor (Dexcom G7 Sensor)  Self No No   Sig: Use 1 Device every 10 days   Insulin Disposable Pump (Omnipod 5 G6 Intro, Gen 5,) KIT  Self No No   Sig: Use 1 Units continuous   Insulin Disposable Pump (Omnipod 5 G6 Pods, Gen 5,) MISC  Self No No   Sig: Use 1 Units every other day   Insulin Glargine-yfgn (Semglee, yfgn,) 100 UNIT/ML SOPN   No No   Sig: Inject 0.12 mL (12 Units total) under the  skin every morning   Insulin Pen Needle (Comfort EZ Pen Needles) 33G X 4 MM MISC  Self No No   Sig: Use to inject insulin 4 times a day   Respiratory Therapy Supplies (Nebulizer) SUKHI  Self Yes No   Sig: Please dispense nebulizer machine   Turmeric 500 MG CAPS  Self Yes No   Sig: Take by mouth   acetaminophen (TYLENOL) 325 mg tablet  Self No No   Sig: Take 3 tablets (975 mg total) by mouth every 8 (eight) hours   albuterol (2.5 mg/3 mL) 0.083 % nebulizer solution  Self No No   Sig: Take 6 mL (5 mg total) by nebulization 2 (two) times a day   albuterol (PROVENTIL HFA,VENTOLIN HFA) 90 mcg/act inhaler  Self Yes No   Sig: INHALE 2 PUFFS EVERY 8 HOURS AS NEEDED FOR SHORTNESS OF BREATH OR WHEEZING.   apixaban (ELIQUIS) 5 mg  Self No No   Sig: Take 1 tablet (5 mg total) by mouth 2 (two) times a day   atovaquone (MEPRON) 750 mg/5 mL suspension  Self Yes No   Sig: Take 1,500 mg by mouth daily   azithromycin (ZITHROMAX) 250 mg tablet  Self Yes No   Sig: Take 250 mg by mouth 3 (three) times a week   bisacodyl (DULCOLAX) 5 mg EC tablet  Self No No   Sig: Take 1 tablet (5 mg total) by mouth daily at bedtime   dexlansoprazole (DEXILANT) 60 MG capsule  Self No No   Sig: TAKE 1 CAPSULE BY MOUTH EVERY DAY IN THE MORNING   dronabinol (MARINOL) 5 MG capsule  Self No No   Sig: Take 1 capsule (5 mg total) by mouth 3 (three) times a day   famotidine (PEPCID) 40 MG tablet  Self No No   Sig: Take 1 tablet (40 mg total) by mouth daily at bedtime   insulin aspart (NovoLOG FlexPen) 100 UNIT/ML injection pen   No No   Sig: INJECT 10 UNITS pre-BREAKFAST, 8 UNITS pre-LUNCH and 12 UNITS pre-DINNER plus scale; max 50 UNITS/day   multivitamin (THERAGRAN) TABS  Self Yes No   Sig: Take 1 tablet by mouth daily   naloxone (NARCAN) 4 mg/0.1 mL nasal spray  Self No No   Si.1 mL (4 mg total) into each nostril every 3 (three) minutes as needed for opioid reversal or respiratory depression   ondansetron (ZOFRAN) 4 mg tablet  Self Yes No   Sig: Take 4 mg  by mouth   Patient not taking: Reported on 8/1/2024   oxyCODONE (ROXICODONE) 10 MG TABS   No No   Sig: Take 1 to 2 tablets every 8hs as needed for severe pain. Max dose 6 tabs a day   oxybutynin (DITROPAN XL) 15 MG 24 hr tablet   No No   Sig: Take 1 tablet (15 mg total) by mouth daily at bedtime   pancrelipase, Lip-Prot-Amyl, (CREON) 24,000 units  Self Yes No   Sig: Take 48,000 Units by mouth 3 (three) times a day with meals   polyethylene glycol (MIRALAX) 17 g packet  Self No No   Sig: Take 17 g by mouth 2 (two) times a day   predniSONE 10 mg tablet  Self Yes No   Sig: Take 10 mg by mouth daily   pregabalin (LYRICA) 150 mg capsule  Self No No   Sig: Take 1 capsule (150 mg total) by mouth 2 times a day   promethazine-dextromethorphan (PHENERGAN-DM) 6.25-15 mg/5 mL oral syrup  Self No No   Sig: Take 5 mL by mouth 4 (four) times a day as needed for cough   tacrolimus (PROGRAF) 1 mg capsule   No No   Sig: Take 2 capsules (2 mg total) by mouth 2 (two) times a day   tamsulosin (FLOMAX) 0.4 mg   No No   Sig: Take 1 capsule (0.4 mg total) by mouth daily with dinner   valACYclovir (VALTREX) 500 mg tablet  Self Yes No   Sig: Take 500 mg by mouth daily   vitamin B-12 (VITAMIN B-12) 1,000 mcg tablet  Self Yes No   Sig: Take by mouth daily      Facility-Administered Medications: None     Patient's Medications   Discharge Prescriptions    No medications on file     No discharge procedures on file.  ED SEPSIS DOCUMENTATION   Time reflects when diagnosis was documented in both MDM as applicable and the Disposition within this note       Time User Action Codes Description Comment    9/27/2024  1:24 PM Vida Rossi Add [N39.0] UTI (urinary tract infection)     9/27/2024  1:24 PM Vida Rossi Add [R50.2,  T45.1X5A] Immunosuppressive-induced fever     9/27/2024  1:24 PM Vida Rossi Remove [R50.2,  T45.1X5A] Immunosuppressive-induced fever     9/27/2024  1:25 PM Vida Rossi Add [Z94.2] Hx of lung transplant (HCC)            Initial  Sepsis Screening       Row Name 09/27/24 0849                Is the patient's history suggestive of a new or worsening infection? Yes (Proceed)  -ZS        Suspected source of infection urinary tract infection  -ZS        Indicate SIRS criteria Hyperthemia > 38.3C (100.9F) OR Hypothermia <36C (96.8F);Tachycardia > 90 bpm  -ZS        Are two or more of the above signs & symptoms of infection both present and new to the patient? Yes (Proceed)  -ZS        Assess for evidence of organ dysfunction: Are any of the below criteria present within 6 hours of suspected infection and SIRS criteria that are NOT considered to be chronic conditions? --                  User Key  (r) = Recorded By, (t) = Taken By, (c) = Cosigned By      Initials Name Provider Type    WAYNE Burks Nurse Practitioner                       WAYNE Jefferson  09/27/24 5140

## 2024-09-27 NOTE — ASSESSMENT & PLAN NOTE
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 abscessus/boletti colonization, right pneumonectomy 10/2022. Current immunosuppresive regimen is tacrolimus and prednisone.  Managed by YAYA Mcdonnell.   Continue chronic prophylaxis with atovaquone, valacyclovir, azithromycin.

## 2024-09-27 NOTE — ASSESSMENT & PLAN NOTE
UA with innumerable RBC, WBC.  Positive for nitrites.  Also with symptoms of dysuria, hematuria, frequency.  Plan is under sepsis

## 2024-09-27 NOTE — ASSESSMENT & PLAN NOTE
Status post left ureteral stent placement and basket stone extraction at [facility] 9/20/2024.

## 2024-09-27 NOTE — H&P
H&P - Hospitalist   Name: Maximilian Mann 48 y.o. female I MRN: [id number]  Unit/Bed#: ED 25 I Date of Admission: 9/27/2024   Date of Service: 9/27/2024 I Hospital Day: 0     Assessment & Plan  Sepsis (HCC)  Fever, tachycardia, leukocytosis  Suspected source: UTI  Recent instrumentation with stent placement on left ureter for nephrolithiasis  Most recent urine cultures growing only Candida species    Plan  Infectious disease consult given immunocompromise status and recent urine cultures, appreciate recommendations  Isolyte at 100 cc/h  Cefepime empirically for now  Discussed with her transplant team at [facility] - recommended avoiding stress dose steroids unless further decompensation  Acute cystitis  UA with innumerable RBC, WBC.  Positive for nitrites.  Also with symptoms of dysuria, hematuria, frequency.  Plan is under sepsis  Immunosuppression ([facility])  Secondary to lung transplant at [facility].    Continue home tacrolimus 2 mg twice daily, prednisone 15 mg daily  Continue home prophylactic meds: Azithromycin Monday Wednesday Friday, valacyclovir daily, atovaquone daily  Type 1 diabetes mellitus with diabetic chronic kidney disease, unspecified CKD stage ([facility])  Lab Results   Component Value Date    HGBA1C 7.5 (H) 09/21/2024       Recent Labs     09/25/24  1613 09/25/24  2120 09/26/24  0726 09/26/24  1100   POCGLU 205* 124 115 126       Blood Sugar Average: Last 72 hrs:    Home regimen: Basal insulin 12 units every morning.  Patient unsure bolus regimen given her prednisone has been being tapered.    Plan  Continue home basal insulin  5 units lispro with meals plus sliding scale for now    Renal calculus, left  Status post stent placement on 9/20 [facility]  ED contacted urology, they said she could stay at [facility] given stent appears to be in proper place  Urology consulted, appreciate recommendations  Pancreatic insufficiency  Continue home pancrelipase  Anemia  Baseline appears to be around 9-10  Check iron  panel, B12, folate  GERD (gastroesophageal reflux disease)  Continue home Dexilant 60 mg daily per patient preference  Continue home Pepcid 40 mg nightly  History of pulmonary embolus (PE)  Continue home Eliquis 5 mg twice daily  Elevated serum creatinine  Baseline appears to be around 0.9-1.1  1.3 on admission  Continue to monitor    VTE Pharmacologic Prophylaxis:   High Risk (Score >/= 5) - Pharmacological DVT Prophylaxis Ordered: apixaban (Eliquis). Sequential Compression Devices Ordered.  Code Status: Level 1 - Full Code   Discussion with family: Patient declined call to .     Anticipated Length of Stay: Patient will be admitted on an inpatient basis with an anticipated length of stay of greater than 2 midnights secondary to sepsis secondary to UTI.    History of Present Illness   Chief Complaint: Flank pain and fever    Maximilian Mnan is a 48 y.o. female with a PMH of cystic fibrosis status post lung transplant at [facility] who presents with fever and flank pain.  Was recently hospitalized at [facility] from 9/20 - 9/26 for left sided nephrolithiasis.  She had a stent placed during that admission.  She returns to care today due to the development of fevers.  She complains of congestion but no cough or sore throat.  No difficulty breathing.  No nausea vomiting or diarrhea.  She has severe left-sided flank pain, dysuria, urinary frequency, mild hematuria.  In the ED was noted to be febrile at 100.8 °F, tachycardic at 106 bpm, leukocytosis at 13.  Urine suspicious for infection.  Was given 2 L of IV fluids in the ED and ceftriaxone admitted to the medicine service.    Review of Systems   Reason unable to perform ROS: See HPI.       I have reviewed the patient's PMH, PSH, Social History, Family History, Meds, and Allergies  Social History:  Marital Status: /Civil Union   Occupation:   Patient Pre-hospital Living Situation: Home  Patient Pre-hospital Level of Mobility: walks  Patient  Pre-hospital Diet Restrictions:     Objective     Vitals:   Blood Pressure: 119/63 (09/27/24 1400)  Pulse: 73 (09/27/24 1400)  Temperature: 99.5 °F (37.5 °C) (09/27/24 1130)  Temp Source: Oral (09/27/24 1130)  Respirations: 22 (09/27/24 1400)  Weight - Scale: 58.9 kg (129 lb 13.6 oz) (09/27/24 0748)  SpO2: 95 % (09/27/24 1400)    Physical Exam  Constitutional:       General: She is not in acute distress.     Appearance: She is ill-appearing (Secondary to flank pain).   HENT:      Mouth/Throat:      Mouth: Mucous membranes are moist.      Pharynx: Oropharynx is clear.   Cardiovascular:      Rate and Rhythm: Normal rate and regular rhythm.      Heart sounds: No murmur heard.  Pulmonary:      Effort: Pulmonary effort is normal. No respiratory distress.      Breath sounds: Normal breath sounds. No wheezing, rhonchi or rales.   Abdominal:      General: Abdomen is flat.      Palpations: Abdomen is soft.      Tenderness: There is left CVA tenderness.   Musculoskeletal:      Right lower leg: No edema.      Left lower leg: No edema.   Skin:     General: Skin is warm and dry.   Neurological:      General: No focal deficit present.      Mental Status: She is alert and oriented to person, place, and time. Mental status is at baseline.   Psychiatric:         Mood and Affect: Mood normal.         Thought Content: Thought content normal.         Lines/Drains:  Lines/Drains/Airways       Active Status       Name Placement date Placement time Site Days    Port A Cath 11/25/22 Right Chest 11/25/22  0953  Chest  672    Port A Cath 11/25/22 Right Chest 11/25/22  1201  Chest  672                    Central Line:  Goal for removal: N/A - Chronic PICC           Additional Data:   Lab Results: I have reviewed the following results:   Results from last 7 days   Lab Units 09/27/24  0840   WBC Thousand/uL 13.45*   HEMOGLOBIN g/dL 9.9*   HEMATOCRIT % 32.5*   PLATELETS Thousands/uL 170   SEGS PCT % 76*   LYMPHO PCT % 14   MONO PCT % 9   EOS PCT  % 0     Results from last 7 days   Lab Units 09/27/24  0840   SODIUM mmol/L 137   POTASSIUM mmol/L 4.5   CHLORIDE mmol/L 101   CO2 mmol/L 28   BUN mg/dL 46*   CREATININE mg/dL 1.32*   ANION GAP mmol/L 8   CALCIUM mg/dL 8.7   ALBUMIN g/dL 3.3*   TOTAL BILIRUBIN mg/dL 0.34   ALK PHOS U/L 59   ALT U/L 12   AST U/L 13   GLUCOSE RANDOM mg/dL 128     Results from last 7 days   Lab Units 09/27/24  0840   INR  1.37*     Results from last 7 days   Lab Units 09/26/24  1100 09/26/24  0726 09/25/24  2120 09/25/24  1613 09/25/24  1120 09/25/24  0755 09/24/24  2114 09/24/24  1708 09/24/24  1109 09/24/24  0721 09/23/24  2140 09/23/24  1652   POC GLUCOSE mg/dl 126 115 124 205* 115 171* 233* 219* 178* 131 229* 287*     Lab Results   Component Value Date    HGBA1C 7.5 (H) 09/21/2024    HGBA1C 7.6 (H) 05/27/2024    HGBA1C 8.0 (A) 01/15/2024     Results from last 7 days   Lab Units 09/27/24  0840 09/22/24  0927   LACTIC ACID mmol/L 1.1 1.2   PROCALCITONIN ng/ml 0.19  --        Imaging Review: Reviewed radiology reports from this admission including: CT abdomen/pelvis.  Other Studies: No additional pertinent studies reviewed.    Administrative Statements       ** Please Note: This note has been constructed using a voice recognition system. **

## 2024-09-28 PROBLEM — N12 PYELONEPHRITIS: Status: RESOLVED | Noted: 2024-09-27 | Resolved: 2024-09-28

## 2024-09-28 PROBLEM — E87.8 ELECTROLYTE ABNORMALITY: Status: ACTIVE | Noted: 2024-09-28

## 2024-09-28 LAB
ALBUMIN SERPL BCG-MCNC: 3 G/DL (ref 3.5–5)
ALP SERPL-CCNC: 58 U/L (ref 34–104)
ALT SERPL W P-5'-P-CCNC: 11 U/L (ref 7–52)
ANION GAP SERPL CALCULATED.3IONS-SCNC: 6 MMOL/L (ref 4–13)
AST SERPL W P-5'-P-CCNC: 11 U/L (ref 13–39)
BACTERIA UR CULT: NORMAL
BASOPHILS # BLD AUTO: 0.01 THOUSANDS/ΜL (ref 0–0.1)
BASOPHILS NFR BLD AUTO: 0 % (ref 0–1)
BILIRUB SERPL-MCNC: 0.26 MG/DL (ref 0.2–1)
BUN SERPL-MCNC: 31 MG/DL (ref 5–25)
CALCIUM ALBUM COR SERPL-MCNC: 8.9 MG/DL (ref 8.3–10.1)
CALCIUM SERPL-MCNC: 8.1 MG/DL (ref 8.4–10.2)
CHLORIDE SERPL-SCNC: 102 MMOL/L (ref 96–108)
CO2 SERPL-SCNC: 29 MMOL/L (ref 21–32)
CREAT SERPL-MCNC: 1.02 MG/DL (ref 0.6–1.3)
EOSINOPHIL # BLD AUTO: 0.02 THOUSAND/ΜL (ref 0–0.61)
EOSINOPHIL NFR BLD AUTO: 0 % (ref 0–6)
ERYTHROCYTE [DISTWIDTH] IN BLOOD BY AUTOMATED COUNT: 14.2 % (ref 11.6–15.1)
FERRITIN SERPL-MCNC: 22 NG/ML (ref 11–307)
FOLATE SERPL-MCNC: >22.3 NG/ML
GFR SERPL CREATININE-BSD FRML MDRD: 66 ML/MIN/1.73SQ M
GLUCOSE SERPL-MCNC: 167 MG/DL (ref 65–140)
GLUCOSE SERPL-MCNC: 186 MG/DL (ref 65–140)
GLUCOSE SERPL-MCNC: 247 MG/DL (ref 65–140)
GLUCOSE SERPL-MCNC: 301 MG/DL (ref 65–140)
GLUCOSE SERPL-MCNC: 76 MG/DL (ref 65–140)
HCT VFR BLD AUTO: 28.9 % (ref 34.8–46.1)
HGB BLD-MCNC: 8.7 G/DL (ref 11.5–15.4)
IMM GRANULOCYTES # BLD AUTO: 0.13 THOUSAND/UL (ref 0–0.2)
IMM GRANULOCYTES NFR BLD AUTO: 2 % (ref 0–2)
IRON SATN MFR SERPL: 7 % (ref 15–50)
IRON SERPL-MCNC: 17 UG/DL (ref 50–212)
LYMPHOCYTES # BLD AUTO: 1.36 THOUSANDS/ΜL (ref 0.6–4.47)
LYMPHOCYTES NFR BLD AUTO: 22 % (ref 14–44)
MCH RBC QN AUTO: 29 PG (ref 26.8–34.3)
MCHC RBC AUTO-ENTMCNC: 30.1 G/DL (ref 31.4–37.4)
MCV RBC AUTO: 96 FL (ref 82–98)
MONOCYTES # BLD AUTO: 0.36 THOUSAND/ΜL (ref 0.17–1.22)
MONOCYTES NFR BLD AUTO: 6 % (ref 4–12)
NEUTROPHILS # BLD AUTO: 4.36 THOUSANDS/ΜL (ref 1.85–7.62)
NEUTS SEG NFR BLD AUTO: 70 % (ref 43–75)
NRBC BLD AUTO-RTO: 0 /100 WBCS
PLATELET # BLD AUTO: 147 THOUSANDS/UL (ref 149–390)
PMV BLD AUTO: 9.9 FL (ref 8.9–12.7)
POTASSIUM SERPL-SCNC: 4.8 MMOL/L (ref 3.5–5.3)
PROCALCITONIN SERPL-MCNC: 0.32 NG/ML
PROT SERPL-MCNC: 6.1 G/DL (ref 6.4–8.4)
RBC # BLD AUTO: 3 MILLION/UL (ref 3.81–5.12)
SODIUM SERPL-SCNC: 137 MMOL/L (ref 135–147)
TIBC SERPL-MCNC: 252 UG/DL (ref 250–450)
UIBC SERPL-MCNC: 235 UG/DL (ref 155–355)
VIT B12 SERPL-MCNC: 4945 PG/ML (ref 180–914)
WBC # BLD AUTO: 6.24 THOUSAND/UL (ref 4.31–10.16)

## 2024-09-28 PROCEDURE — NC001 PR NO CHARGE: Performed by: NURSE PRACTITIONER

## 2024-09-28 PROCEDURE — 99223 1ST HOSP IP/OBS HIGH 75: CPT | Performed by: NURSE PRACTITIONER

## 2024-09-28 PROCEDURE — NC001 PR NO CHARGE: Performed by: UROLOGY

## 2024-09-28 PROCEDURE — 85025 COMPLETE CBC W/AUTO DIFF WBC: CPT | Performed by: INTERNAL MEDICINE

## 2024-09-28 PROCEDURE — 94760 N-INVAS EAR/PLS OXIMETRY 1: CPT

## 2024-09-28 PROCEDURE — 83540 ASSAY OF IRON: CPT | Performed by: INTERNAL MEDICINE

## 2024-09-28 PROCEDURE — 82728 ASSAY OF FERRITIN: CPT | Performed by: INTERNAL MEDICINE

## 2024-09-28 PROCEDURE — 80197 ASSAY OF TACROLIMUS: CPT | Performed by: INTERNAL MEDICINE

## 2024-09-28 PROCEDURE — 99239 HOSP IP/OBS DSCHRG MGMT >30: CPT | Performed by: NURSE PRACTITIONER

## 2024-09-28 PROCEDURE — 82607 VITAMIN B-12: CPT | Performed by: INTERNAL MEDICINE

## 2024-09-28 PROCEDURE — 82948 REAGENT STRIP/BLOOD GLUCOSE: CPT

## 2024-09-28 PROCEDURE — 99233 SBSQ HOSP IP/OBS HIGH 50: CPT | Performed by: INTERNAL MEDICINE

## 2024-09-28 PROCEDURE — 83550 IRON BINDING TEST: CPT | Performed by: INTERNAL MEDICINE

## 2024-09-28 PROCEDURE — 94640 AIRWAY INHALATION TREATMENT: CPT

## 2024-09-28 PROCEDURE — 84145 PROCALCITONIN (PCT): CPT | Performed by: INTERNAL MEDICINE

## 2024-09-28 PROCEDURE — 82746 ASSAY OF FOLIC ACID SERUM: CPT | Performed by: INTERNAL MEDICINE

## 2024-09-28 PROCEDURE — 80053 COMPREHEN METABOLIC PANEL: CPT | Performed by: INTERNAL MEDICINE

## 2024-09-28 RX ORDER — LANOLIN ALCOHOL/MO/W.PET/CERES
800 CREAM (GRAM) TOPICAL ONCE
Status: COMPLETED | OUTPATIENT
Start: 2024-09-28 | End: 2024-09-28

## 2024-09-28 RX ORDER — FLUCONAZOLE 100 MG/1
200 TABLET ORAL DAILY
Status: DISCONTINUED | OUTPATIENT
Start: 2024-09-29 | End: 2024-09-29 | Stop reason: HOSPADM

## 2024-09-28 RX ORDER — SODIUM CHLORIDE FOR INHALATION 3 %
4 VIAL, NEBULIZER (ML) INHALATION
Status: DISCONTINUED | OUTPATIENT
Start: 2024-09-28 | End: 2024-09-29 | Stop reason: HOSPADM

## 2024-09-28 RX ORDER — INSULIN LISPRO 100 [IU]/ML
1-5 INJECTION, SOLUTION INTRAVENOUS; SUBCUTANEOUS
Status: DISCONTINUED | OUTPATIENT
Start: 2024-09-28 | End: 2024-09-29 | Stop reason: HOSPADM

## 2024-09-28 RX ORDER — PHENAZOPYRIDINE HYDROCHLORIDE 100 MG/1
100 TABLET, FILM COATED ORAL
Status: DISCONTINUED | OUTPATIENT
Start: 2024-09-28 | End: 2024-09-29 | Stop reason: HOSPADM

## 2024-09-28 RX ADMIN — CEFTRIAXONE SODIUM 1000 MG: 10 INJECTION, POWDER, FOR SOLUTION INTRAVENOUS at 21:33

## 2024-09-28 RX ADMIN — VALACYCLOVIR HYDROCHLORIDE 500 MG: 500 TABLET, FILM COATED ORAL at 08:12

## 2024-09-28 RX ADMIN — HYDROMORPHONE HYDROCHLORIDE 0.5 MG: 1 INJECTION, SOLUTION INTRAMUSCULAR; INTRAVENOUS; SUBCUTANEOUS at 06:38

## 2024-09-28 RX ADMIN — CYANOCOBALAMIN TAB 500 MCG 1000 MCG: 500 TAB at 08:13

## 2024-09-28 RX ADMIN — Medication 5000 UNITS: at 08:12

## 2024-09-28 RX ADMIN — SODIUM CHLORIDE SOLN NEBU 3% 4 ML: 3 NEBU SOLN at 18:05

## 2024-09-28 RX ADMIN — B-COMPLEX W/ C & FOLIC ACID TAB 1 TABLET: TAB at 08:12

## 2024-09-28 RX ADMIN — ALBUTEROL SULFATE 5 MG: 2.5 SOLUTION RESPIRATORY (INHALATION) at 07:18

## 2024-09-28 RX ADMIN — APIXABAN 5 MG: 5 TABLET, FILM COATED ORAL at 17:08

## 2024-09-28 RX ADMIN — CEFEPIME 1000 MG: 2 INJECTION, POWDER, FOR SOLUTION INTRAVENOUS at 01:36

## 2024-09-28 RX ADMIN — INSULIN LISPRO 2 UNITS: 100 INJECTION, SOLUTION INTRAVENOUS; SUBCUTANEOUS at 17:03

## 2024-09-28 RX ADMIN — FLUCONAZOLE 400 MG: 100 TABLET ORAL at 08:13

## 2024-09-28 RX ADMIN — PANCRELIPASE 48000 UNITS: 120000; 24000; 76000 CAPSULE, DELAYED RELEASE PELLETS ORAL at 17:03

## 2024-09-28 RX ADMIN — DEXLANSOPRAZOLE 60 MG: 60 CAPSULE, DELAYED RELEASE ORAL at 08:14

## 2024-09-28 RX ADMIN — PANCRELIPASE 48000 UNITS: 120000; 24000; 76000 CAPSULE, DELAYED RELEASE PELLETS ORAL at 13:29

## 2024-09-28 RX ADMIN — INSULIN LISPRO 2 UNITS: 100 INJECTION, SOLUTION INTRAVENOUS; SUBCUTANEOUS at 08:22

## 2024-09-28 RX ADMIN — FAMOTIDINE 40 MG: 20 TABLET, FILM COATED ORAL at 20:39

## 2024-09-28 RX ADMIN — TACROLIMUS 2 MG: 0.5 CAPSULE ORAL at 17:08

## 2024-09-28 RX ADMIN — OXYCODONE HYDROCHLORIDE 10 MG: 10 TABLET ORAL at 05:27

## 2024-09-28 RX ADMIN — ATOVAQUONE 1500 MG: 750 SUSPENSION ORAL at 08:24

## 2024-09-28 RX ADMIN — OXYCODONE HYDROCHLORIDE 10 MG: 10 TABLET ORAL at 01:35

## 2024-09-28 RX ADMIN — POLYETHYLENE GLYCOL 3350 17 G: 17 POWDER, FOR SOLUTION ORAL at 08:20

## 2024-09-28 RX ADMIN — OXYBUTYNIN CHLORIDE 15 MG: 5 TABLET, EXTENDED RELEASE ORAL at 20:39

## 2024-09-28 RX ADMIN — PANCRELIPASE 48000 UNITS: 120000; 24000; 76000 CAPSULE, DELAYED RELEASE PELLETS ORAL at 08:18

## 2024-09-28 RX ADMIN — INSULIN LISPRO 2 UNITS: 100 INJECTION, SOLUTION INTRAVENOUS; SUBCUTANEOUS at 12:11

## 2024-09-28 RX ADMIN — TAMSULOSIN HYDROCHLORIDE 0.4 MG: 0.4 CAPSULE ORAL at 17:17

## 2024-09-28 RX ADMIN — POLYETHYLENE GLYCOL 3350 17 G: 17 POWDER, FOR SOLUTION ORAL at 17:16

## 2024-09-28 RX ADMIN — PREGABALIN 150 MG: 75 CAPSULE ORAL at 17:08

## 2024-09-28 RX ADMIN — ALBUTEROL SULFATE 5 MG: 2.5 SOLUTION RESPIRATORY (INHALATION) at 18:04

## 2024-09-28 RX ADMIN — PREDNISONE 15 MG: 5 TABLET ORAL at 08:12

## 2024-09-28 RX ADMIN — HYDROMORPHONE HYDROCHLORIDE 0.5 MG: 1 INJECTION, SOLUTION INTRAMUSCULAR; INTRAVENOUS; SUBCUTANEOUS at 15:04

## 2024-09-28 RX ADMIN — CEFEPIME 1000 MG: 2 INJECTION, POWDER, FOR SOLUTION INTRAVENOUS at 15:08

## 2024-09-28 RX ADMIN — PHENAZOPYRIDINE 100 MG: 100 TABLET ORAL at 17:18

## 2024-09-28 RX ADMIN — INSULIN GLARGINE 12 UNITS: 100 INJECTION, SOLUTION SUBCUTANEOUS at 08:22

## 2024-09-28 RX ADMIN — OXYCODONE HYDROCHLORIDE 10 MG: 10 TABLET ORAL at 17:08

## 2024-09-28 RX ADMIN — PREGABALIN 150 MG: 75 CAPSULE ORAL at 08:13

## 2024-09-28 RX ADMIN — PHENAZOPYRIDINE 100 MG: 100 TABLET ORAL at 11:00

## 2024-09-28 RX ADMIN — Medication 800 MG: at 10:53

## 2024-09-28 RX ADMIN — OXYCODONE HYDROCHLORIDE 10 MG: 10 TABLET ORAL at 22:46

## 2024-09-28 RX ADMIN — INSULIN LISPRO 2 UNITS: 100 INJECTION, SOLUTION INTRAVENOUS; SUBCUTANEOUS at 17:00

## 2024-09-28 RX ADMIN — BISACODYL 5 MG: 5 TABLET, COATED ORAL at 20:38

## 2024-09-28 RX ADMIN — HYDROMORPHONE HYDROCHLORIDE 0.5 MG: 1 INJECTION, SOLUTION INTRAMUSCULAR; INTRAVENOUS; SUBCUTANEOUS at 10:54

## 2024-09-28 RX ADMIN — INSULIN LISPRO 3 UNITS: 100 INJECTION, SOLUTION INTRAVENOUS; SUBCUTANEOUS at 21:33

## 2024-09-28 RX ADMIN — TACROLIMUS 2 MG: 0.5 CAPSULE ORAL at 08:12

## 2024-09-28 RX ADMIN — INSULIN LISPRO 1 UNITS: 100 INJECTION, SOLUTION INTRAVENOUS; SUBCUTANEOUS at 08:23

## 2024-09-28 RX ADMIN — APIXABAN 5 MG: 5 TABLET, FILM COATED ORAL at 08:12

## 2024-09-28 RX ADMIN — HYDROMORPHONE HYDROCHLORIDE 0.5 MG: 1 INJECTION, SOLUTION INTRAMUSCULAR; INTRAVENOUS; SUBCUTANEOUS at 19:05

## 2024-09-28 RX ADMIN — ALPRAZOLAM 1 MG: 0.5 TABLET ORAL at 21:33

## 2024-09-28 NOTE — ASSESSMENT & PLAN NOTE
Fever, tachycardia, leukocytosis  Suspected source: UTI  Recent instrumentation with stent placement on left ureter for nephrolithiasis  Most recent urine cultures growing only Candida species    Plan  Infectious disease consult given immunocompromise status and recent urine cultures, appreciate recommendations  Isolyte at 100 cc/h  Cefepime empirically for now  Discussed with her transplant team at [facility] - recommended avoiding stress dose steroids unless further decompensation

## 2024-09-28 NOTE — ASSESSMENT & PLAN NOTE
Secondary to lung transplant at [facility].    Continue home tacrolimus 2 mg twice daily, prednisone 15 mg daily  Continue home prophylactic meds: Azithromycin Monday Wednesday Friday, valacyclovir daily, atovaquone daily

## 2024-09-28 NOTE — ASSESSMENT & PLAN NOTE
Fever, tachycardia, leukocytosis  Suspected source: UTI  Recent instrumentation with stent placement on left ureter for nephrolithiasis-looks like she passed a stone today 9/28  Most recent urine cultures growing only Candida species  Infectious disease consult given immunocompromise status and recent urine cultures, appreciate recommendations  Cefepime received for 48 hrs going to discharge on PO cefpodoxime/fluc.   Blood cultures- no growth 24  hrs  Urine culture shows mixed contaminant

## 2024-09-28 NOTE — ASSESSMENT & PLAN NOTE
Status post stent placement on 9/20 [facility]  ED contacted urology, they said she could stay at [facility] given stent appears to be in proper place  Urology consulted, appreciate recommendations

## 2024-09-28 NOTE — PLAN OF CARE
Problem: RESPIRATORY - ADULT  Goal: Achieves optimal ventilation and oxygenation  Description: INTERVENTIONS:  - Assess for changes in respiratory status  - Assess for changes in mentation and behavior  - Position to facilitate oxygenation and minimize respiratory effort  - Oxygen administered by appropriate delivery if ordered  - Initiate smoking cessation education as indicated  - Encourage broncho-pulmonary hygiene including cough, deep breathe, Incentive Spirometry  - Assess the need for suctioning and aspirate as needed  - Assess and instruct to report SOB or any respiratory difficulty  - Respiratory Therapy support as indicated  Outcome: Progressing     Problem: GASTROINTESTINAL - ADULT  Goal: Minimal or absence of nausea and/or vomiting  Description: INTERVENTIONS:  - Administer IV fluids if ordered to ensure adequate hydration  - Maintain NPO status until nausea and vomiting are resolved  - Nasogastric tube if ordered  - Administer ordered antiemetic medications as needed  - Provide nonpharmacologic comfort measures as appropriate  - Advance diet as tolerated, if ordered  - Consider nutrition services referral to assist patient with adequate nutrition and appropriate food choices  Outcome: Progressing

## 2024-09-28 NOTE — DISCHARGE SUMMARY
Discharge Summary - Hospitalist   Name: Maximilian Mann 48 y.o. female I MRN: [id number]  Unit/Bed#: -[facility] I Date of Admission: 9/27/2024   Date of Service: 9/28/2024 I Hospital Day: 1     Assessment & Plan  Sepsis ([facility])  Fever, tachycardia, leukocytosis  Suspected source: UTI  Recent instrumentation with stent placement on left ureter for nephrolithiasis-looks like she passed a stone today 9/28  Most recent urine cultures growing only Candida species  Infectious disease consult given immunocompromise status and recent urine cultures, appreciate recommendations  Cefepime received for 48 hrs going to discharge on PO cefpodoxime/fluc.   Blood cultures- no growth 24  hrs  Urine culture shows mixed contaminant  Acute pyelonephritis  UA with innumerable RBC, WBC.  Positive for nitrites.  Also with symptoms of dysuria, hematuria, frequency.  Plan is under sepsis  Immunosuppression ([facility])  Secondary to lung transplant at [facility].  Continue home tacrolimus 2 mg twice daily, prednisone 15 mg daily  Continue home prophylactic meds: Azithromycin Monday Wednesday Friday, valacyclovir daily, atovaquone daily  Type 1 diabetes mellitus with diabetic chronic kidney disease, unspecified CKD stage ([facility])  Lab Results   Component Value Date    HGBA1C 7.5 (H) 09/21/2024       Recent Labs     09/27/24  1548 09/27/24  2035 09/28/24  0708 09/28/24  1109   POCGLU 232* 198* 167* 76       Blood Sugar Average: Last 72 hrs:  (P) 168.25  Home regimen: Basal insulin 12 units every morning.  Patient unsure bolus regimen given her prednisone has been being tapered.  Continue home basal insulin  5 units lispro with meals plus sliding scale for now    Renal calculus, left  Status post stent placement on 9/20 [facility]  ED contacted urology, they said she could stay at [facility] given stent appears to be in proper place  Urology consulted, appreciate recommendations  Anemia  Baseline appears to be around 9-10  Check iron panel, B12,  folate  History of pulmonary embolus (PE)  Continue home Eliquis 5 mg twice daily  Elevated serum creatinine  Baseline appears to be around 0.9-1.1  1.3 on admission  1.0 now  Pyelonephritis    Electrolyte abnormality      Discharging Physician / Practitioner: WAYNE Houser  PCP: Solis Avila MD  Admission Date:   Admission Orders (From admission, onward)       Ordered        09/27/24 1400  INPATIENT ADMISSION  Once                          Discharge Date: 09/28/24    Medical Problems       Resolved Problems  Date Reviewed: 9/26/2024   None         Consultations During Hospital Stay:  IP CONSULT TO INFECTIOUS DISEASES  IP CONSULT TO UROLOGY    Procedures Performed:   CT chest abdomen pelvis wo contrast    Result Date: 9/27/2024  1.  Left-sided nephrolithiasis with upper collecting infundibular stones and layering stones in the urinary bladder. Bladder stones are new having migrated from the upper collecting system since prior. No stones projecting along the course of the appropriately positioned left ureteral stent. No hydronephrosis. 2.  Mild focal bronchiolitis at the left lower lobe. No pneumonic consolidation. Workstation performed: [id number]     CT abdomen pelvis wo contrast    Result Date: 9/23/2024  Interval lithotripsy of previously noted large calculus in the left renal pelvis, now with multiple small stone fragments measuring up to 4 mm noted in the interpolar and lower pole calyces. A left ureteral stent is seen in place, in good position. No hydronephrosis. Resident: Mykel Muller I, the attending radiologist, have reviewed the images and agree with the final report above. Workstation performed: [id number]         Significant Findings / Test Results:   Above    Incidental Findings:   None    Test Results Pending at Discharge (will require follow up):   None     Outpatient Tests Requested:  None    Complications: None    Past Medical History:   Diagnosis Date    Abnormal uterine bleeding  (AUB)     ABPA (allergic bronchopulmonary aspergillosis) (HCC) 07/08/2012    Acute kidney injury (HCC) 09/08/2017    Acute on chronic respiratory failure (HCC) 03/04/2017    Last Assessment & Plan:  Formatting of this note might be different from the original. Recurrent with clinical worsening with enlarging PTX, s/p multiple chest tubes, but new fevers 6/19 and worsening leukocytosis indicating infection as cause of 6/19 decompensation with profound hypoxia in setting of shunting through right lung and continued pneumothorax s/p 2nd chest tube placement.  6/27: Right    Anaphylaxis 06/06/2022    Anemia     Asthma     Chest pain 09/21/2021    Chronic pain     Chronic respiratory failure with hypoxia ([facility]) 09/07/2021    COVID-19 06/03/2022    Cystic fibrosis ([facility])     Deep vein thrombosis ([facility])     Diabetes mellitus (HCC)     GERD (gastroesophageal reflux disease)     HBP (high blood pressure)     HCAP (healthcare-associated pneumonia) 03/06/2020    History of transfusion     Influenza B 02/23/2020    Kidney stone     Neutropenic fever  ([facility]) 09/26/2020    Pneumonia due to Pseudomonas species ([facility]) 10/28/2010    Last Assessment & Plan:  Formatting of this note might be different from the original. Polymicrobial pna w/ pseudomonas, M. Abscessus, Cadida glabrata/albicans    PONV (postoperative nausea and vomiting)     Pseudomonas aeruginosa infection 09/20/2012    Renal calculus     Squamous cell skin cancer 01/12/2023    SCCIS- Left upper thigh    Transplant recipient     Lung- 2013, 2017    Ulcerative colitis ([facility])     Wellness examination 02/04/2013    Formatting of this note might be different from the original. Discussed with patient and acknowledgement form signed on 2/4/2013 (ALYSHA)       Reason for Admission: Fever (Pt reports that she was just discharged from [facility] yesterday for kidney stones, now has a fever of 101.8)       Hospital Course:     Maximilian Mann is a 48 y.o. female patient with past medical  "history of above who originally presented to the hospital on 9/27/2024 due to Fever (Pt reports that she was just discharged from [facility] yesterday for kidney stones, now has a fever of 101.8) patient came in activating sepsis secondary to UTI she had significant improvement with fluids and antibiotics she had recent stent placement in [place].  Passed  stone while here      Please see above list of diagnoses and related plan for additional information.     Condition at Discharge: stable     Discharge Day Visit / Exam:     Subjective: Denies any chest pain chest tightness shortness of breath or difficulty breathing reports improvement of flank pain has chronic urgency and frequency  Vitals: Blood Pressure: 120/74 (09/28/24 0711)  Pulse: 73 (09/27/24 2206)  Temperature: 98.2 °F (36.8 °C) (09/28/24 0711)  Temp Source: Oral (09/27/24 1130)  Respirations: 18 (09/28/24 0711)  Height: 5' 2.5\" (158.8 cm) (09/27/24 1542)  Weight - Scale: 59.4 kg (130 lb 14.4 oz) (09/27/24 1542)  SpO2: 98 % (09/28/24 0720)  Exam:   Physical Exam  Vitals reviewed.   Constitutional:       General: She is not in acute distress.     Appearance: She is not ill-appearing.   Cardiovascular:      Rate and Rhythm: Normal rate.   Pulmonary:      Effort: No respiratory distress.   Neurological:      Mental Status: She is alert. Mental status is at baseline.   Psychiatric:         Mood and Affect: Mood normal.       Discussion with Family:  at bedside    Discharge instructions/Information to patient and family:   See after visit summary for information provided to patient and family.      Provisions for Follow-Up Care:  See after visit summary for information related to follow-up care and any pertinent home health orders.      Disposition:     Home    Planned Readmission: no     Discharge Statement:  I spent 45 minutes discharging the patient. This time was spent on the day of discharge. I had direct contact with the patient on the day of " discharge. Greater than 50% of the total time was spent examining patient, answering all patient questions, arranging and discussing plan of care with patient as well as directly providing post-discharge instructions.  Additional time then spent on discharge activities.    Discharge Medications:  See after visit summary for reconciled discharge medications provided to patient and family.      ** Please Note: This note has been constructed using a voice recognition system **

## 2024-09-28 NOTE — ASSESSMENT & PLAN NOTE
Status post stent placement on 9/20 [facility]  ED contacted urology, they said she could stay at [facility] given stent appears to be in proper place  Urology consulted, appreciate recommendations

## 2024-09-28 NOTE — PROGRESS NOTES
Progress Note - Hospitalist   Name: Maximilian Mann 48 y.o. female I MRN: [id number]  Unit/Bed#: -01 I Date of Admission: 9/27/2024   Date of Service: 9/28/2024 I Hospital Day: 1    Assessment & Plan  Sepsis ([facility])  Fever, tachycardia, leukocytosis  Suspected source: UTI  Recent instrumentation with stent placement on left ureter for nephrolithiasis  Most recent urine cultures growing only Candida species    Plan  Infectious disease consult given immunocompromise status and recent urine cultures, appreciate recommendations  Isolyte at 100 cc/h  Cefepime empirically for now  Discussed with her transplant team at [place] - recommended avoiding stress dose steroids unless further decompensation  Acute pyelonephritis  UA with innumerable RBC, WBC.  Positive for nitrites.  Also with symptoms of dysuria, hematuria, frequency.  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    Continue home tacrolimus 2 mg twice daily, prednisone 15 mg daily  Continue home prophylactic meds: Azithromycin Monday Wednesday Friday, valacyclovir daily, atovaquone daily  Type 1 diabetes mellitus with diabetic chronic kidney disease, unspecified CKD stage ([facility])  Lab Results   Component Value Date    HGBA1C 7.5 (H) 09/21/2024       Recent Labs     09/26/24  1100 09/27/24  1548 09/27/24  2035 09/28/24  0708   POCGLU 126 232* 198* 167*   Blood Sugar Average: Last 72 hrs:  (P) 199  Home regimen: Basal insulin 12 units every morning.  Patient unsure bolus regimen given her prednisone has been being tapered.  Continue home basal insulin  5 units lispro with meals plus sliding scale for now    Renal calculus, left  Status post stent placement on 9/20 [facility]  ED contacted urology, they said she could stay at [facility] given stent appears to be in proper place  Urology consulted, appreciate recommendations  Anemia  Baseline appears to be around 9-10  Check iron panel, B12, folate  History of pulmonary  embolus (PE)  Continue home Eliquis 5 mg twice daily  Elevated serum creatinine  Baseline appears to be around 0.9-1.1  1.3 on admission  Continue to monitor  Pyelonephritis    Electrolyte abnormality  Hypomagnesia   Supplementation provided    VTE Pharmacologic Prophylaxis:   Moderate Risk (Score 3-4) - Pharmacological DVT Prophylaxis Ordered: apixaban (Eliquis).    Mobility:   Basic Mobility Inpatient Raw Score: 23  JH-HLM Goal: 7: Walk 25 feet or more  JH-HLM Achieved: 7: Walk 25 feet or more  JH-HLM Goal achieved. Continue to encourage appropriate mobility.    Patient Centered Rounds: I performed bedside rounds with nursing staff today.   Discussions with Specialists or Other Care Team Provider: Reviewed notes    Education and Discussions with Family / Patient: Patient declined call to .     Current Length of Stay: 1 day(s)  Current Patient Status: Inpatient   Certification Statement: The patient will continue to require additional inpatient hospital stay due to ending blood cultures  Discharge Plan: Anticipate discharge later today or tomorrow to home.    Code Status: Level 1 - Full Code    Subjective   Sitting up in bed resting comfortably reports some mild flank pain that is improving no fevers for 24 hours denies any chest pain chest tightness shortness of breath or difficulty breathing and is asking for discharge    Objective     Vitals:   Temp (24hrs), Av.8 °F (37.1 °C), Min:97.4 °F (36.3 °C), Max:99.5 °F (37.5 °C)    Temp:  [97.4 °F (36.3 °C)-99.5 °F (37.5 °C)] 98.2 °F (36.8 °C)  HR:  [70-75] 73  Resp:  [16-22] 18  BP: ()/(53-74) 120/74  SpO2:  [92 %-98 %] 98 %  Body mass index is 23.56 kg/m².     Input and Output Summary (last 24 hours):     Intake/Output Summary (Last 24 hours) at 2024 0951  Last data filed at 2024 2301  Gross per 24 hour   Intake 2410 ml   Output 1400 ml   Net 1010 ml       Physical Exam     Lines/Drains:  Lines/Drains/Airways       Active Status        Name Placement date Placement time Site Days    Port A Cath 11/25/22 Right Chest 11/25/22  0953  Chest  672    Port A Cath 11/25/22 Right Chest 11/25/22  1201  Chest  672                    Central Line:  Goal for removal: N/A - Chronic PICC               Lab Results: I have reviewed the following results:    Results from last 7 days   Lab Units 09/28/24  0539   WBC Thousand/uL 6.24   HEMOGLOBIN g/dL 8.7*   HEMATOCRIT % 28.9*   PLATELETS Thousands/uL 147*   SEGS PCT % 70   LYMPHO PCT % 22   MONO PCT % 6   EOS PCT % 0     Results from last 7 days   Lab Units 09/28/24  0539   SODIUM mmol/L 137   POTASSIUM mmol/L 4.8   CHLORIDE mmol/L 102   CO2 mmol/L 29   BUN mg/dL 31*   CREATININE mg/dL 1.02   ANION GAP mmol/L 6   CALCIUM mg/dL 8.1*   ALBUMIN g/dL 3.0*   TOTAL BILIRUBIN mg/dL 0.26   ALK PHOS U/L 58   ALT U/L 11   AST U/L 11*   GLUCOSE RANDOM mg/dL 186*     Results from last 7 days   Lab Units 09/27/24  0840   INR  1.37*     Results from last 7 days   Lab Units 09/28/24  0708 09/27/24  2035 09/27/24  1548 09/26/24  1100 09/26/24  0726 09/25/24  2120 09/25/24  1613 09/25/24  1120 09/25/24  0755 09/24/24  2114 09/24/24  1708 09/24/24  1109   POC GLUCOSE mg/dl 167* 198* 232* 126 115 124 205* 115 171* 233* 219* 178*         Results from last 7 days   Lab Units 09/28/24  0539 09/27/24  0840 09/22/24  0927   LACTIC ACID mmol/L  --  1.1 1.2   PROCALCITONIN ng/ml 0.32* 0.19  --        Recent Cultures (last 7 days):   Results from last 7 days   Lab Units 09/27/24  0950 09/27/24  0940 09/25/24  1001 09/22/24  1338 09/22/24  0927 09/22/24  0926   BLOOD CULTURE  Received in Microbiology Lab. Culture in Progress. Received in Microbiology Lab. Culture in Progress. No Growth at 48 hrs.  --  No Growth After 5 Days. No Growth After 5 Days.   URINE CULTURE   --   --   --  40,000-49,000 cfu/ml  --   --        Imaging Review: No pertinent imaging studies reviewed.  Other Studies: No additional pertinent studies reviewed.    Last 24 Hours  Medication List:     Current Facility-Administered Medications:     acetaminophen (TYLENOL) tablet 650 mg, Q6H PRN    albuterol (PROVENTIL HFA,VENTOLIN HFA) inhaler 1 puff, Q4H PRN    albuterol inhalation solution 5 mg, BID    ALPRAZolam (XANAX) tablet 1 mg, HS PRN    apixaban (ELIQUIS) tablet 5 mg, BID    atovaquone (MEPRON) oral suspension 1,500 mg, Daily    azithromycin (ZITHROMAX) tablet 250 mg, Once per day on Monday Wednesday Friday    bisacodyl (DULCOLAX) EC tablet 5 mg, HS    cefepime (MAXIPIME) 1 g/50 mL dextrose IVPB, Q12H, Last Rate: 1,000 mg (09/28/24 0136)    Cholecalciferol (VITAMIN D3) tablet 5,000 Units, Daily    cyanocobalamin (VITAMIN B-12) tablet 1,000 mcg, Daily    dexlansoprazole (DEXILANT) capsule 60 mg, Daily    dextromethorphan-guaiFENesin (ROBITUSSIN DM) oral syrup 10 mL, Q4H PRN    dronabinol (MARINOL) capsule 5 mg, TID    famotidine (PEPCID) tablet 40 mg, HS    fluconazole (DIFLUCAN) tablet 400 mg, Daily    fluticasone (FLONASE) 50 mcg/act nasal spray 1 spray, Daily    HYDROmorphone (DILAUDID) injection 0.5 mg, Q2H PRN    insulin glargine (LANTUS) subcutaneous injection 12 Units 0.12 mL, QAM    insulin lispro (HumALOG/ADMELOG) 100 units/mL subcutaneous injection 1-5 Units, TID AC **AND** Fingerstick Glucose (POCT), TID AC    insulin lispro (HumALOG/ADMELOG) 100 units/mL subcutaneous injection 2 Units, TID With Meals    magnesium Oxide (MAG-OX) tablet 800 mg, Once    multi-electrolyte (PLASMALYTE-A/ISOLYTE-S PH 7.4) IV solution, Continuous, Last Rate: 100 mL/hr (09/27/24 1803)    multivitamin stress formula tablet 1 tablet, Daily    oxybutynin (DITROPAN-XL) 24 hr tablet 15 mg, HS    oxyCODONE (ROXICODONE) IR tablet 5 mg, Q4H PRN **OR** oxyCODONE (ROXICODONE) immediate release tablet 10 mg, Q4H PRN    pancrelipase (Lip-Prot-Amyl) (CREON) delayed release capsule 48,000 Units, TID With Meals    polyethylene glycol (MIRALAX) packet 17 g, BID    predniSONE tablet 15 mg, Daily    pregabalin  (LYRICA) capsule 150 mg, BID    sodium chloride 3 % inhalation solution 4 mL, BID    tacrolimus (PROGRAF) capsule 2 mg, BID    tamsulosin (FLOMAX) capsule 0.4 mg, Daily With Dinner    valACYclovir (VALTREX) tablet 500 mg, Daily    Administrative Statements   Today, Patient Was Seen By: WAYNE Houser  I have spent a total time of 35 minutes in caring for this patient on the day of the visit/encounter including Diagnostic results, Risks and benefits of tx options, Instructions for management, Patient and family education, Importance of tx compliance, Documenting in the medical record, Obtaining or reviewing history  , and Communicating with other healthcare professionals .    **Please Note: This note may have been constructed using a voice recognition system.**

## 2024-09-28 NOTE — ASSESSMENT & PLAN NOTE
Secondary to lung transplant at [facility].  Continue home tacrolimus 2 mg twice daily, prednisone 15 mg daily  Continue home prophylactic meds: Azithromycin Monday Wednesday Friday, valacyclovir daily, atovaquone daily

## 2024-09-28 NOTE — ASSESSMENT & PLAN NOTE
SIRS criteria on presentation.  Fortunately not prominently septic.  Responding positively to resuscitation.   etiology--given recent urologic procedure and indwelling ureteral stent.

## 2024-09-28 NOTE — ASSESSMENT & PLAN NOTE
Status post ureteroscopy, laser lithotripsy and left ureteral stent placement for management of obstructing left ureteral stones--resolved.  Stent is favorably position.  No evidence of hydronephrosis or other complication on CT.  No plans for  surgical intervention at this interval--not indicated.  Patient will follow-up with Dr. Briceno as scheduled to reevaluate stone burden and plan for second stage procedure.  Appreciate medical management.

## 2024-09-28 NOTE — ASSESSMENT & PLAN NOTE
Based on clinical presentation and recent history.    No extraordinary perinephric or periureteral stranding on CT.  No abscess formation.  Plan: Symptom management, pharmacologic and nonpharmacologic (I.ie.,  Heating pad) and antibiotics per internal medicine--Well-appreciated.

## 2024-09-28 NOTE — ASSESSMENT & PLAN NOTE
Lab Results   Component Value Date    HGBA1C 7.5 (H) 09/21/2024       Recent Labs     09/26/24  1100 09/27/24  1548 09/27/24 2035 09/28/24  0708   POCGLU 126 232* 198* 167*   Blood Sugar Average: Last 72 hrs:  (P) 199  Home regimen: Basal insulin 12 units every morning.  Patient unsure bolus regimen given her prednisone has been being tapered.  Continue home basal insulin  5 units lispro with meals plus sliding scale for now

## 2024-09-28 NOTE — UTILIZATION REVIEW
Initial Clinical Review    Admission: Date/Time/Statement:   Admission Orders (From admission, onward)       Ordered        09/27/24 1400  INPATIENT ADMISSION  Once                          Orders Placed This Encounter   Procedures    INPATIENT ADMISSION     Standing Status:   Standing     Number of Occurrences:   1     Order Specific Question:   Level of Care     Answer:   Med Surg [16]     Order Specific Question:   Estimated length of stay     Answer:   More than 2 Midnights     Order Specific Question:   Certification     Answer:   I certify that inpatient services are medically necessary for this patient for a duration of greater than two midnights. See H&P and MD Progress Notes for additional information about the patient's course of treatment.     ED Arrival Information       Expected   -    Arrival   9/27/2024 07:42    Acuity   Emergent              Means of arrival   Walk-In    Escorted by   Spouse    Service   Hospitalist    Admission type   Emergency              Arrival complaint   FEVER             Chief Complaint   Patient presents with    Fever     Pt reports that she was just discharged from [facility] yesterday for kidney stones, now has a fever of 101.8       Initial Presentation: 48 y.o. female with a PMH of cystic fibrosis status post lung transplant at [facility] who presented to the ED from home with complaint of fever and flank pain.  Was recently hospitalized at the [facility] from 9/20 - 9/26 for left sided nephrolithiasis.  She had a stent placed during that admission.  She returns today due to the development of fever with severe left sided left flank pain, dysuria, urinary frequency and mild hematuria.   She complains of congestion but no cough or sore throat.   In the ED was noted to be febrile at 100.8 °F, tachycardic at 106 bpm, leukocytosis at 13.  Urine suspicious for infection.  Was given 2 L of IV fluids in the ED and ceftriaxone, and IV pain medication. Exam:  AOx3. L CVA tenderness.      9/27 Inpatient admission for evaluation and treatment of sepsis, suspected source UTI:  IV cefepime, IVF, consult Infectious Disease given immunocompromised state. Continue home tacrolimus, home prophylactic meds, Azithromycin, valcyclovir and atovaquone.     9/27 Infectious Disease consult:   Sepsis (HCC). Pyelonephritis. Immunosuppression, elevated serum creatinine. Fever, tachycardia, leukocytosis.  Consider urinary source in the setting of recent urologic procedure, abnormal urinalysis. No other potential foci of infection visualized on CT abdomen/pelvis. No clinical or radiographic evidence to suggest new lung infection. Negative COVID-19/influenza.  Patient is hemodynamically stable, nontoxic in appearance.  Plan:  Change ceftriaxone to cefepime for now pending cultures. Add high dose renally adjusted fluconazole 400 mg daily for now.  Follow-up pending blood cultures. Follow-up urine culture.If patient does not improve on current antimicrobials and/or yeast is isolated on blood culture, would add empiric micafungin. . Recheck CBC in AM to assess for treatment response.    9/28 Internal Medicine:  Patient reports some mild flank pain that is improving,  no fevers for 24 hours. Continue cefepime, pending blood cultures, Urology consulted.   Baseline creatinine appears to be around 0.9-1.1, continue to monitor.  Magnesium supplemented. Urology consult:  Status post ureteroscopy, laser lithotripsy and left ureteral stent placement for management of obstructing left ureteral stones--resolved.  Stent is favorably position.  No evidence of hydronephrosis or other complication on CT.   Creatinine is at baseline.  No plans for  surgical intervention at this interval. Acute pyelonephritis. Based on clinical presentation and recent history.  No extraordinary perinephric or periureteral stranding on CT.  No abscess formation.  Plan: Symptom management, pharmacologic and nonpharmacologic (I.ie.,  Heating pad) and  antibiotics per Internal Medicine. Exam:  Moderate tenderness left flank.       ED Treatment-Medication Administration from 09/27/2024 0742 to 09/27/2024 1533         Date/Time Order Dose Route Action     09/27/2024 0955 ceftriaxone (ROCEPHIN) 2 g/50 mL in dextrose IVPB 2,000 mg Intravenous New Bag     09/27/2024 0855 multi-electrolyte (PLASMALYTE-A/ISOLYTE-S PH 7.4) IV solution 1,000 mL 1,000 mL Intravenous New Bag     09/27/2024 1002 multi-electrolyte (PLASMALYTE-A/ISOLYTE-S PH 7.4) IV solution 1,000 mL 1,000 mL Intravenous New Bag     09/27/2024 0841 acetaminophen (Ofirmev) injection 1,000 mg 1,000 mg Intravenous New Bag     09/27/2024 0938 HYDROmorphone (DILAUDID) injection 0.5 mg 0.5 mg Intravenous Given     09/27/2024 1111 HYDROmorphone (DILAUDID) injection 0.5 mg 0.5 mg Intravenous Given     09/27/2024 1111 fluticasone (FLONASE) 50 mcg/act nasal spray 1 spray 1 spray Each Nare Given     09/27/2024 1305 HYDROmorphone (DILAUDID) injection 0.5 mg 0.5 mg Intravenous Given     09/27/2024 1507 HYDROmorphone (DILAUDID) injection 0.5 mg 0.5 mg Intravenous Given            Scheduled Medications:    albuterol, 5 mg, Nebulization, BID  apixaban, 5 mg, Oral, BID  atovaquone, 1,500 mg, Oral, Daily  azithromycin, 250 mg, Oral, Once per day on Monday Wednesday Friday  bisacodyl, 5 mg, Oral, HS  cefepime, 1,000 mg, Intravenous, Q12H  Cholecalciferol, 5,000 Units, Oral, Daily  vitamin B-12, 1,000 mcg, Oral, Daily  dexlansoprazole, 60 mg, Oral, Daily  dronabinol, 5 mg, Oral, TID  famotidine, 40 mg, Oral, HS  fluconazole, 400 mg, Oral, Daily  fluticasone, 1 spray, Each Nare, Daily  insulin glargine, 12 Units, Subcutaneous, QAM  insulin lispro, 1-5 Units, Subcutaneous, TID AC  insulin lispro, 2 Units, Subcutaneous, TID With Meals  multivitamin stress formula, 1 tablet, Oral, Daily  oxybutynin, 15 mg, Oral, HS  pancrelipase (Lip-Prot-Amyl), 48,000 Units, Oral, TID With Meals  phenazopyridine, 100 mg, Oral, TID With  Meals  polyethylene glycol, 17 g, Oral, BID  predniSONE, 15 mg, Oral, Daily  pregabalin, 150 mg, Oral, BID  sodium chloride, 4 mL, Nebulization, BID  tacrolimus, 2 mg, Oral, BID  tamsulosin, 0.4 mg, Oral, Daily With Dinner  valACYclovir, 500 mg, Oral, Daily      Continuous IV Infusions:    multi-electrolyte (PLASMALYTE-A/ISOLYTE-S PH 7.4) IV solution  Rate: 100 mL/hr Dose: 100 mL/hr  Freq: Continuous Route: IV  Indications of Use: IV Hydration  Last Dose: Stopped (09/28/24 1054)  Start: 09/27/24 1430 End: 09/28/24 1002        PRN Meds:      acetaminophen, 650 mg, Oral, Q6H PRN  albuterol, 1 puff, Inhalation, Q4H PRN  ALPRAZolam, 1 mg, Oral, HS PRN  dextromethorphan-guaiFENesin, 10 mL, Oral, Q4H PRN  HYDROmorphone, 0.5 mg, Intravenous, Q2H PRN x 2 doses 9/27 @ 1803, 2139, x 3 doses 9/28 thus far  oxyCODONE, 5 mg, Oral, Q4H PRN  Or  oxyCODONE, 10 mg, Oral, Q4H PRN x 2 doses 9/27,  x 3 doses 9/28 thus far      ED Triage Vitals [09/27/24 0748]   Temperature Pulse Respirations Blood Pressure SpO2 Pain Score   (!) 101.8 °F (38.8 °C) (!) 106 20 118/68 97 % 6     Weight (last 2 days)       Date/Time Weight    09/27/24 15:42:21 59.4 (130.9)    09/27/24 0748 58.9 (129.85)            Vital Signs (last 3 days)       Date/Time Temp Pulse Resp BP MAP (mmHg) SpO2 O2 Device Patient Position - Orthostatic VS Pain    09/28/24 1708 -- -- -- -- -- -- -- -- 7    09/28/24 1504 -- -- -- 123/99 108 -- -- -- 7    09/28/24 15:01:35 98.3 °F (36.8 °C) 75 17 -- -- 94 % -- -- --    09/28/24 1054 -- -- -- -- -- -- -- -- 7 09/28/24 0743 -- -- -- -- -- -- None (Room air) -- --    09/28/24 0720 -- -- -- -- -- 98 % None (Room air) -- --    09/28/24 0711 98.2 °F (36.8 °C) -- 18 120/74 89 -- -- -- --    09/28/24 0638 -- -- -- -- -- -- -- -- 7 09/28/24 0527 -- -- -- -- -- -- -- -- 8    09/28/24 0135 -- -- -- -- -- -- -- -- 7 09/27/24 22:06:07 98.4 °F (36.9 °C) 73 18 130/72 -- 93 % -- Sitting --    09/27/24 2139 -- -- -- -- -- -- -- -- 10 -  Worst Possible Pain    09/27/24 2055 -- -- -- -- -- 94 % None (Room air) -- --    09/27/24 2041 -- -- -- -- -- -- -- -- 8 09/27/24 1950 -- -- -- -- -- -- None (Room air) -- --    09/27/24 1803 -- -- -- -- -- -- -- -- 7 09/27/24 15:42:21 97.4 °F (36.3 °C) 74 19 132/71 91 94 % None (Room air) -- 7    09/27/24 1534 -- -- -- -- -- -- -- -- 5    09/27/24 1400 -- 73 22 119/63 85 95 % None (Room air) Sitting --    09/27/24 1230 -- 73 18 105/59 78 92 % None (Room air) Sitting --    09/27/24 1211 -- 70 18 91/56 -- 93 % None (Room air) Sitting --    09/27/24 1130 99.5 °F (37.5 °C) 72 18 90/62 70 95 % None (Room air) Sitting --    09/27/24 1115 -- 72 20 109/70 -- 95 % None (Room air) Sitting --    09/27/24 1111 -- -- -- -- -- -- -- -- 8 09/27/24 1100 99.5 °F (37.5 °C) 75 16 111/56 77 94 % None (Room air) Sitting --    09/27/24 1014 99.5 °F (37.5 °C) 73 21 96/53 67 94 % None (Room air) Sitting --    09/27/24 0938 -- -- -- -- -- -- -- -- 8 09/27/24 0810 -- -- -- -- -- -- None (Room air) -- --    09/27/24 0748 101.8 °F (38.8 °C) 106 20 118/68 -- 97 % None (Room air) Lying 6              Pertinent Labs/Diagnostic Test Results:   Radiology:  CT chest abdomen pelvis wo contrast   Final Interpretation by Slick Villanueva MD (09/27 0930)      1.  Left-sided nephrolithiasis with upper collecting infundibular stones and layering stones in the urinary bladder. Bladder stones are new having migrated from the upper collecting system since prior. No stones projecting along the course of the    appropriately positioned left ureteral stent. No hydronephrosis.      2.  Mild focal bronchiolitis at the left lower lobe. No pneumonic consolidation.      Workstation performed: [id number]           Cardiology:  ECG 12 lead   Final Result by Dwight Ty MD (09/27 1622)   Normal sinus rhythm   Normal ECG   When compared with ECG of 26-MAY-2024 11:38,   No significant change was found   Confirmed by Dwight Ty ([id number]) on 9/27/2024  4:22:09 PM        GI:  No orders to display       Results from last 7 days   Lab Units 09/22/24  0832   SARS-COV-2  Negative     Results from last 7 days   Lab Units 09/28/24  0539 09/27/24  0840 09/26/24  0544 09/25/24  0426 09/24/24  0447 09/24/24  0447   WBC Thousand/uL 6.24 13.45* 6.48 6.29  --  6.40   HEMOGLOBIN g/dL 8.7* 9.9* 8.9* 9.9*  --  9.6*   HEMATOCRIT % 28.9* 32.5* 29.6* 32.2*  --  31.6*   PLATELETS Thousands/uL 147* 170 142* 158  --  151   TOTAL NEUT ABS Thousands/µL 4.36 10.27* 4.43 4.44   < >  --     < > = values in this interval not displayed.         Results from last 7 days   Lab Units 09/28/24  0539 09/27/24  0840 09/26/24  0544 09/25/24  0426 09/24/24  0447   SODIUM mmol/L 137 137 135 137 135   POTASSIUM mmol/L 4.8 4.5 4.0 4.0 3.8   CHLORIDE mmol/L 102 101 100 96 95*   CO2 mmol/L 29 28 31 33* 33*   ANION GAP mmol/L 6 8 4 8 7   BUN mg/dL 31* 46* 40* 29* 36*   CREATININE mg/dL 1.02 1.32* 1.11 1.11 1.13   EGFR ml/min/1.73sq m 66 48 59 59 58   CALCIUM mg/dL 8.1* 8.7 8.2* 8.7 8.4     Results from last 7 days   Lab Units 09/28/24  0539 09/27/24  0840   AST U/L 11* 13   ALT U/L 11 12   ALK PHOS U/L 58 59   TOTAL PROTEIN g/dL 6.1* 6.6   ALBUMIN g/dL 3.0* 3.3*   TOTAL BILIRUBIN mg/dL 0.26 0.34     Results from last 7 days   Lab Units 09/28/24  1609 09/28/24  1109 09/28/24  0708 09/27/24  2035 09/27/24  1548 09/26/24  1100 09/26/24  0726 09/25/24  2120 09/25/24  1613 09/25/24  1120 09/25/24  0755 09/24/24  2114   POC GLUCOSE mg/dl 247* 76 167* 198* 232* 126 115 124 205* 115 171* 233*     Results from last 7 days   Lab Units 09/28/24  0539 09/27/24  0840 09/26/24  0544 09/25/24  0426 09/24/24  0447 09/23/24  0514 09/22/24  0449   GLUCOSE RANDOM mg/dL 186* 128 110 157* 119 192* 152*             Beta- Hydroxybutyrate   Date Value Ref Range Status   05/21/2024 0.06 0.02 - 0.27 mmol/L Final                Results from last 7 days   Lab Units 09/27/24  0840   PROTIME seconds 17.6*   INR  1.37*   PTT seconds  >210*         Results from last 7 days   Lab Units 09/28/24  0539 09/27/24  0840   PROCALCITONIN ng/ml 0.32* 0.19     Results from last 7 days   Lab Units 09/27/24  0840 09/22/24  0927   LACTIC ACID mmol/L 1.1 1.2               Results from last 7 days   Lab Units 09/27/24  1003 09/22/24  1338   CLARITY UA  Turbid Turbid   COLOR UA  Dark Yellow Dark Yellow   SPEC GRAV UA  1.018 1.006   PH UA  6.0 5.5   GLUCOSE UA mg/dl Negative Negative   KETONES UA mg/dl Negative Negative   BLOOD UA  Large* Large*   PROTEIN UA mg/dl 100 (2+)* 70 (1+)*   NITRITE UA  Positive* Negative   BILIRUBIN UA  Small* Negative   UROBILINOGEN UA (BE) mg/dl 2.0* <2.0   LEUKOCYTES UA  Large* Moderate*   WBC UA /hpf Innumerable* 20-30*   RBC UA /hpf Innumerable* Innumerable*   BACTERIA UA /hpf None Seen Innumerable*   EPITHELIAL CELLS WET PREP /hpf Occasional Occasional   MUCUS THREADS   --  Occasional*     Results from last 7 days   Lab Units 09/22/24  0832   INFLUENZA A PCR  Negative   INFLUENZA B PCR  Negative   RSV PCR  Negative                             Results from last 7 days   Lab Units 09/27/24  1003 09/27/24  0950 09/27/24  0940 09/25/24  1001 09/22/24  1338 09/22/24  0927 09/22/24  0926   BLOOD CULTURE   --  No Growth at 24 hrs. No Growth at 24 hrs. No Growth at 72 hrs.  --  No Growth After 5 Days. No Growth After 5 Days.   URINE CULTURE  40,000-49,000 cfu/ml  --   --   --  40,000-49,000 cfu/ml  --   --                    Past Medical History:   Diagnosis Date    Abnormal uterine bleeding (AUB)     ABPA (allergic bronchopulmonary aspergillosis) ([facility]) 07/08/2012    Acute kidney injury ([facility]) 09/08/2017    Acute on chronic respiratory failure ([facility]) 03/04/2017    Last Assessment & Plan:  Formatting of this note might be different from the original. Recurrent with clinical worsening with enlarging PTX, s/p multiple chest tubes, but new fevers 6/19 and worsening leukocytosis indicating infection as cause of 6/19 decompensation with profound  hypoxia in setting of shunting through right lung and continued pneumothorax s/p 2nd chest tube placement.  6/27: Right    Anaphylaxis 06/06/2022    Anemia     Asthma     Chest pain 09/21/2021    Chronic pain     Chronic respiratory failure with hypoxia (HCC) 09/07/2021    COVID-19 06/03/2022    Cystic fibrosis (HCC)     Deep vein thrombosis (HCC)     Diabetes mellitus (HCC)     GERD (gastroesophageal reflux disease)     HBP (high blood pressure)     HCAP (healthcare-associated pneumonia) 03/06/2020    History of transfusion     Influenza B 02/23/2020    Kidney stone     Neutropenic fever  (HCC) 09/26/2020    Pneumonia due to Pseudomonas species (HCC) 10/28/2010    Last Assessment & Plan:  Formatting of this note might be different from the original. Polymicrobial pna w/ pseudomonas, M. Abscessus, Cadida glabrata/albicans    PONV (postoperative nausea and vomiting)     Pseudomonas aeruginosa infection 09/20/2012    Renal calculus     Squamous cell skin cancer 01/12/2023    SCCIS- Left upper thigh    Transplant recipient     Lung- 2013, 2017    Ulcerative colitis (HCC)     Wellness examination 02/04/2013    Formatting of this note might be different from the original. Discussed with patient and acknowledgement form signed on 2/4/2013 (ALYSHA)     Present on Admission:   Type 1 diabetes mellitus with diabetic chronic kidney disease, unspecified CKD stage (HCC)   Immunosuppression (HCC)   History of pulmonary embolus (PE)   Renal calculus, left   Sepsis (HCC)   Acute pyelonephritis   Anemia   Elevated serum creatinine   (Resolved) Pyelonephritis   Electrolyte abnormality      Admitting Diagnosis: UTI (urinary tract infection) [N39.0]  Fever [R50.9]  Hx of lung transplant (HCC) [Z94.2]  Age/Sex: 48 y.o. female        Network Utilization Review Department  ATTENTION: Please call with any questions or concerns to [phone] and carefully listen to the prompts so that you are directed to the right person. All voicemails are  confidential.   For Discharge needs, contact Care Management DC Support Team at [phone] opt. 2  Send all requests for admission clinical reviews, approved or denied determinations and any other requests to dedicated fax number below belonging to the campus where the patient is receiving treatment. List of dedicated fax numbers for the Facilities:  FACILITY NAME UR FAX NUMBER   ADMISSION DENIALS (Administrative/Medical Necessity) [phone]   DISCHARGE SUPPORT TEAM (NETWORK) [phone]   PARENT CHILD HEALTH (Maternity/NICU/Pediatrics) [phone]   [facility] [phone]   [facility] [phone]   [facility] [phone]   [facility] [phone]   [facility] [phone]   [facility] [phone]   [facility] [phone]   [facility] [phone]   [facility] [phone]   [facility] [phone]   [facility] [phone]   [facility] [phone]

## 2024-09-28 NOTE — CONSULTS
Consultation - Urology   Name: Maximilian Mann 48 y.o. female I MRN: [id number]  Unit/Bed#: [facility]-01 I Date of Admission: 9/27/2024   Date of Service: 9/28/2024 I Hospital Day: 1   Consults  Physician Requesting Evaluation: Jhonatan Eaton MD   Reason for Evaluation / Principal Problem: Pyelonephritis    Assessment & Plan  Sepsis ([facility])  SIRS criteria on presentation.  Fortunately not prominently septic.  Responding positively to resuscitation.   etiology--given recent urologic procedure and indwelling ureteral stent.  Type 1 diabetes mellitus with diabetic chronic kidney disease, unspecified CKD stage ([facility])  Lab Results   Component Value Date    HGBA1C 7.5 (H) 09/21/2024       Recent Labs     09/27/24  2035 09/28/24  0708 09/28/24  1109 09/28/24  1609   POCGLU 198* 167* 76 247*       Blood Sugar Average: Last 72 hrs:  (P) 184    Immunosuppression ([facility])  History of cystic fibrosis status post lung transplant.  Managed at [facility].  Renal calculus, left  Status post ureteroscopy, laser lithotripsy and left ureteral stent placement for management of obstructing left ureteral stones--resolved.  Stent is favorably position.  No evidence of hydronephrosis or other complication on CT.  No plans for  surgical intervention at this interval--not indicated.  Patient will follow-up with Dr. Briceno as scheduled to reevaluate stone burden and plan for second stage procedure.  Appreciate medical management.    Acute pyelonephritis  Based on clinical presentation and recent history.    No extraordinary perinephric or periureteral stranding on CT.  No abscess formation.  Plan: Symptom management, pharmacologic and nonpharmacologic (I.ie.,  Heating pad) and antibiotics per internal medicine--Well-appreciated.    Elevated serum creatinine  Modest serum creatinine elevation on presentation secondary to physiologic response to infection and hydration status.  Creatinine at baseline.      History of Present  Harvinder Mann is a 48 y.o. female with significant medical history including cystic fibrosis, bilateral lung transplant, chronic immunosuppression, managed at [facility], recently seen in urologic consultation 9/20 for left renal colic secondary to image confirmed large left renal pelvis stone with associated hydronephrosis.  After discussion with [facility] transplant team, patient was agreeable to proceed with cystoscopy, retrograde pyelography, left ureteroscopy, holmium laser lithotripsy, basket stone extraction and left ureteral stent placement by Dr. Charissa gill.  Procedure went well without adverse event or complications.  For surgical details please refer to operative report dictated 9/20/2024.  Patient was discharged per the internal medicine service with plan for 2-week follow-up to determine residual stone burden and plan for potential staged second procedure.  Unfortunately, patient presented to [facility] emergency room with a chief complaint of fever, malaise, left flank pain, and irritative urinary symptoms.  Patient was admitted to the internal medicine service for additional management.  Patient has defervesced.  She remains normotensive/hemodynamically stable.  Mild leukocytosis with a white count of 13K, now normalized.  Renal function within normal limits.  Urine and blood cultures pending.  Urine analysis demonstrated positive nitrites.  Microscopic showed innumerable WBCs but no bacteria seen.  CT of the abdomen and pelvis demonstrated favorably position ureteral stent without hydronephrosis, phlegmon, abscess discrete fluid collections.  Gallbladder was decompressed.  However there were nonobstructing stone fragments in the dependent portion of bladder--anticipated.  Urologic consultation requested for further management recommendations..    Review of Systems  Review of Systems - History obtained from chart review and the patient  General ROS: positive for   - chills, fatigue, fever, and malaise  Respiratory ROS: no cough, shortness of breath, or wheezing  Cardiovascular ROS: no chest pain or dyspnea on exertion  Gastrointestinal ROS: positive for - abdominal pain and appetite loss  Genito-Urinary ROS: positive for - dysuria and hematuria  negative for - incontinence or pelvic pain  Neurological ROS: no TIA or stroke symptoms    I have reviewed the patient's PMH, PSH, Social History, Family History, Meds, and Allergies  Historical Information   Past Medical History:   Diagnosis Date    Abnormal uterine bleeding (AUB)     ABPA (allergic bronchopulmonary aspergillosis) ([facility]) 07/08/2012    Acute kidney injury ([facility]) 09/08/2017    Acute on chronic respiratory failure ([facility]) 03/04/2017    Last Assessment & Plan:  Formatting of this note might be different from the original. Recurrent with clinical worsening with enlarging PTX, s/p multiple chest tubes, but new fevers 6/19 and worsening leukocytosis indicating infection as cause of 6/19 decompensation with profound hypoxia in setting of shunting through right lung and continued pneumothorax s/p 2nd chest tube placement.  6/27: Right    Anaphylaxis 06/06/2022    Anemia     Asthma     Chest pain 09/21/2021    Chronic pain     Chronic respiratory failure with hypoxia ([facility]) 09/07/2021    COVID-19 06/03/2022    Cystic fibrosis ([facility])     Deep vein thrombosis (HCC)     Diabetes mellitus ([facility])     GERD (gastroesophageal reflux disease)     HBP (high blood pressure)     HCAP (healthcare-associated pneumonia) 03/06/2020    History of transfusion     Influenza B 02/23/2020    Kidney stone     Neutropenic fever  ([facility]) 09/26/2020    Pneumonia due to Pseudomonas species ([facility]) 10/28/2010    Last Assessment & Plan:  Formatting of this note might be different from the original. Polymicrobial pna w/ pseudomonas, M. Abscessus, Cadida glabrata/albicans    PONV (postoperative nausea and vomiting)     Pseudomonas aeruginosa infection 09/20/2012     Renal calculus     Squamous cell skin cancer 01/12/2023    SCCIS- Left upper thigh    Transplant recipient     Lung- 2013, 2017    Ulcerative colitis (HCC)     Wellness examination 02/04/2013    Formatting of this note might be different from the original. Discussed with patient and acknowledgement form signed on 2/4/2013 (ALYSHA)     Past Surgical History:   Procedure Laterality Date    APPENDECTOMY      AUGMENTATION MAMMAPLASTY Bilateral 2004    AUGMENTATION MAMMAPLASTY Right 2021    removed     BREAST SURGERY      Aug.     BRONCHOSCOPY      CHOLECYSTECTOMY      COLONOSCOPY      EGD  07/2019    Yeast Infect.     FL RETROGRADE PYELOGRAM  01/09/2020    FL RETROGRADE PYELOGRAM  02/04/2020    FL RETROGRADE PYELOGRAM  9/20/2024    GASTROSCOPY  03/01/2024    GASTROSTOMY TUBE, PLACE      IR BALLOON-OCCLUDED ANTEGRADE TRANSVENOUS OBLITERATION (BATO)  11/22/2022    IR PORT PLACEMENT  03/22/2022    IR PORT REMOVAL  01/10/2022    IR PORT STRIPPING  11/25/2022    IR TUNNELED CENTRAL LINE PLACEMENT  01/13/2022    LUNG SURGERY      LUNG TRANSPLANT      X2    NM CYSTO BLADDER W/URETERAL CATHETERIZATION Right 01/09/2020    Procedure: CYSTOSCOPY RETROGRADE PYELOGRAM WITH INSERTION STENT URETERAL;  Surgeon: Patricio Perry MD;  Location: [facility];  Service: Urology    NM CYSTO/URETERO W/LITHOTRIPSY &INDWELL STENT INSRT Right 02/04/2020    Procedure: CYSTOSCOPY URETEROSCOPY WITH LITHOTRIPSY HOLMIUM LASER, RETROGRADE PYELOGRAM AND INSERTION STENT URETERAL;  Surgeon: Patricio Perry MD;  Location: [facility];  Service: Urology    NM CYSTO/URETERO W/LITHOTRIPSY &INDWELL STENT INSRT Left 9/20/2024    Procedure: CYSTOSCOPY URETEROSCOPY WITH LITHOTRIPSY HOLMIUM LASER, RETROGRADE PYELOGRAM AND INSERTION STENT URETERAL, BASKET STONE EXTRACTION;  Surgeon: Lee Briceno MD;  Location: [facility];  Service: Urology    SKIN BIOPSY      US GUIDED VASCULAR ACCESS  01/17/2017     Social History     Tobacco Use    Smoking status: Former      Current packs/day: 0.00     Average packs/day: 0.5 packs/day for 12.0 years (6.0 ttl pk-yrs)     Types: Cigarettes     Start date: 1993     Quit date: 2005     Years since quittin.7     Passive exposure: Past    Smokeless tobacco: Never   Vaping Use    Vaping status: Never Used   Substance and Sexual Activity    Alcohol use: Yes     Alcohol/week: 2.0 standard drinks of alcohol     Types: 2 Glasses of wine per week     Comment: social    Drug use: Yes     Frequency: 2.0 times per week     Types: Marijuana     Comment: medical edible marijuana prescribed    Sexual activity: Yes     Partners: Male     Birth control/protection: None     E-Cigarette/Vaping    E-Cigarette Use Never User      E-Cigarette/Vaping Substances    Nicotine No     THC No     CBD No     Flavoring No     Other No     Unknown No      Family History   Problem Relation Age of Onset    Alcohol abuse Mother     Mental illness Mother     Depression Mother     Alcohol abuse Father     COPD Maternal Grandmother     Lung cancer Maternal Grandmother 65    Cancer Maternal Grandmother     COPD Maternal Grandfather     Cancer Maternal Grandfather     COPD Paternal Grandmother     Lung cancer Paternal Grandmother 65    No Known Problems Sister     No Known Problems Paternal Aunt     Breast cancer Neg Hx     Ovarian cancer Neg Hx     Colon cancer Neg Hx      Social History     Tobacco Use    Smoking status: Former     Current packs/day: 0.00     Average packs/day: 0.5 packs/day for 12.0 years (6.0 ttl pk-yrs)     Types: Cigarettes     Start date: 1993     Quit date: 2005     Years since quittin.7     Passive exposure: Past    Smokeless tobacco: Never   Vaping Use    Vaping status: Never Used   Substance and Sexual Activity    Alcohol use: Yes     Alcohol/week: 2.0 standard drinks of alcohol     Types: 2 Glasses of wine per week     Comment: social    Drug use: Yes     Frequency: 2.0 times per week     Types: Marijuana     Comment:  medical edible marijuana prescribed    Sexual activity: Yes     Partners: Male     Birth control/protection: None       Current Facility-Administered Medications:     acetaminophen (TYLENOL) tablet 650 mg, Q6H PRN    albuterol (PROVENTIL HFA,VENTOLIN HFA) inhaler 1 puff, Q4H PRN    albuterol inhalation solution 5 mg, BID    ALPRAZolam (XANAX) tablet 1 mg, HS PRN    apixaban (ELIQUIS) tablet 5 mg, BID    atovaquone (MEPRON) oral suspension 1,500 mg, Daily    azithromycin (ZITHROMAX) tablet 250 mg, Once per day on Monday Wednesday Friday    bisacodyl (DULCOLAX) EC tablet 5 mg, HS    cefepime (MAXIPIME) 1 g/50 mL dextrose IVPB, Q12H, Last Rate: 1,000 mg (09/28/24 1508)    Cholecalciferol (VITAMIN D3) tablet 5,000 Units, Daily    cyanocobalamin (VITAMIN B-12) tablet 1,000 mcg, Daily    dexlansoprazole (DEXILANT) capsule 60 mg, Daily    dextromethorphan-guaiFENesin (ROBITUSSIN DM) oral syrup 10 mL, Q4H PRN    dronabinol (MARINOL) capsule 5 mg, TID    famotidine (PEPCID) tablet 40 mg, HS    fluconazole (DIFLUCAN) tablet 400 mg, Daily    fluticasone (FLONASE) 50 mcg/act nasal spray 1 spray, Daily    HYDROmorphone (DILAUDID) injection 0.5 mg, Q2H PRN    insulin glargine (LANTUS) subcutaneous injection 12 Units 0.12 mL, QAM    insulin lispro (HumALOG/ADMELOG) 100 units/mL subcutaneous injection 1-5 Units, TID AC **AND** Fingerstick Glucose (POCT), TID AC    insulin lispro (HumALOG/ADMELOG) 100 units/mL subcutaneous injection 2 Units, TID With Meals    multivitamin stress formula tablet 1 tablet, Daily    oxybutynin (DITROPAN-XL) 24 hr tablet 15 mg, HS    oxyCODONE (ROXICODONE) IR tablet 5 mg, Q4H PRN **OR** oxyCODONE (ROXICODONE) immediate release tablet 10 mg, Q4H PRN    pancrelipase (Lip-Prot-Amyl) (CREON) delayed release capsule 48,000 Units, TID With Meals    phenazopyridine (PYRIDIUM) tablet 100 mg, TID With Meals    polyethylene glycol (MIRALAX) packet 17 g, BID    predniSONE tablet 15 mg, Daily    pregabalin (LYRICA)  capsule 150 mg, BID    sodium chloride 3 % inhalation solution 4 mL, BID    tacrolimus (PROGRAF) capsule 2 mg, BID    tamsulosin (FLOMAX) capsule 0.4 mg, Daily With Dinner    valACYclovir (VALTREX) tablet 500 mg, Daily  Prior to Admission Medications   Prescriptions Last Dose Informant Patient Reported? Taking?   ALPRAZolam (XANAX) 1 mg tablet Past Week at 2200  No Yes   Sig: Take 1 tablet (1 mg total) by mouth daily at bedtime as needed for anxiety or sleep   Ascorbic Acid (vitamin C) 100 MG tablet 2024 at 0900 Self Yes Yes   Sig: Take 100 mg by mouth daily   BD Pen Needle Abi U/F 32G X 4 MM MISC  Self No No   Si times daily   Cholecalciferol (Vitamin D3) 125 MCG (5000 UT) TABS 2024 at 0900 Self Yes Yes   Sig: Take 5,000 Units by mouth daily   Continuous Blood Gluc  (Dexcom G7 ) SUKHI  Self No No   Sig: Use 1 each continuous   Continuous Glucose Sensor (Dexcom G7 Sensor)  Self No No   Sig: Use 1 Device every 10 days   Insulin Disposable Pump (Omnipod 5 G6 Intro, Gen 5,) KIT  Self No No   Sig: Use 1 Units continuous   Insulin Disposable Pump (Omnipod 5 G6 Pods, Gen 5,) MISC  Self No No   Sig: Use 1 Units every other day   Insulin Glargine-yfgn (Semglee, yfgn,) 100 UNIT/ML SOPN   No No   Sig: Inject 0.12 mL (12 Units total) under the skin every morning   Insulin Pen Needle (Comfort EZ Pen Needles) 33G X 4 MM MISC  Self No No   Sig: Use to inject insulin 4 times a day   Respiratory Therapy Supplies (Nebulizer) SUKHI  Self Yes No   Sig: Please dispense nebulizer machine   Turmeric 500 MG CAPS 2024 at 2200 Self Yes Yes   Sig: Take by mouth   acetaminophen (TYLENOL) 325 mg tablet Past Week Self No Yes   Sig: Take 3 tablets (975 mg total) by mouth every 8 (eight) hours   albuterol (2.5 mg/3 mL) 0.083 % nebulizer solution 2024 at 2200 Self No Yes   Sig: Take 6 mL (5 mg total) by nebulization 2 (two) times a day   albuterol (PROVENTIL HFA,VENTOLIN HFA) 90 mcg/act inhaler Unknown Self  Yes No   Sig: INHALE 2 PUFFS EVERY 8 HOURS AS NEEDED FOR SHORTNESS OF BREATH OR WHEEZING.   apixaban (ELIQUIS) 5 mg  Self No No   Sig: Take 1 tablet (5 mg total) by mouth 2 (two) times a day   atovaquone (MEPRON) 750 mg/5 mL suspension 2024 at 0900 Self Yes Yes   Sig: Take 1,500 mg by mouth daily   azithromycin (ZITHROMAX) 250 mg tablet 2024 at 0900 Self Yes Yes   Sig: Take 250 mg by mouth 3 (three) times a week   bisacodyl (DULCOLAX) 5 mg EC tablet Unknown Self No No   Sig: Take 1 tablet (5 mg total) by mouth daily at bedtime   dexlansoprazole (DEXILANT) 60 MG capsule 2024 at 0900 Self No Yes   Sig: TAKE 1 CAPSULE BY MOUTH EVERY DAY IN THE MORNING   dronabinol (MARINOL) 5 MG capsule Past Month at 0900 Self No Yes   Sig: Take 1 capsule (5 mg total) by mouth 3 (three) times a day   famotidine (PEPCID) 40 MG tablet 2024 at 2200 Self No Yes   Sig: Take 1 tablet (40 mg total) by mouth daily at bedtime   insulin aspart (NovoLOG FlexPen) 100 UNIT/ML injection pen   No No   Sig: INJECT 10 UNITS pre-BREAKFAST, 8 UNITS pre-LUNCH and 12 UNITS pre-DINNER plus scale; max 50 UNITS/day   multivitamin (THERAGRAN) TABS 2024 at 0900 Self Yes Yes   Sig: Take 1 tablet by mouth daily   naloxone (NARCAN) 4 mg/0.1 mL nasal spray  Self No No   Si.1 mL (4 mg total) into each nostril every 3 (three) minutes as needed for opioid reversal or respiratory depression   ondansetron (ZOFRAN) 4 mg tablet  Self Yes No   Sig: Take 4 mg by mouth every 8 (eight) hours as needed for nausea   oxyCODONE (ROXICODONE) 10 MG TABS Past Week  No Yes   Sig: Take 1 to 2 tablets every 8hs as needed for severe pain. Max dose 6 tabs a day   oxybutynin (DITROPAN XL) 15 MG 24 hr tablet 2024  No Yes   Sig: Take 1 tablet (15 mg total) by mouth daily at bedtime   pancrelipase, Lip-Prot-Amyl, (CREON) 24,000 units 2024 at 2200 Self Yes Yes   Sig: Take 48,000 Units by mouth 3 (three) times a day with meals   polyethylene glycol  (MIRALAX) 17 g packet  Self No No   Sig: Take 17 g by mouth 2 (two) times a day   Patient taking differently: Take 17 g by mouth 2 (two) times a day as needed   predniSONE 10 mg tablet 9/26/2024 Self Yes Yes   Sig: Take 10 mg by mouth daily   pregabalin (LYRICA) 150 mg capsule 9/26/2024 at 2200 Self No Yes   Sig: Take 1 capsule (150 mg total) by mouth 2 times a day   promethazine-dextromethorphan (PHENERGAN-DM) 6.25-15 mg/5 mL oral syrup Unknown Self No No   Sig: Take 5 mL by mouth 4 (four) times a day as needed for cough   tacrolimus (PROGRAF) 1 mg capsule 9/26/2024 at 2200  No Yes   Sig: Take 2 capsules (2 mg total) by mouth 2 (two) times a day   tamsulosin (FLOMAX) 0.4 mg 9/26/2024 at 2200  No Yes   Sig: Take 1 capsule (0.4 mg total) by mouth daily with dinner   valACYclovir (VALTREX) 500 mg tablet 9/26/2024 at 2200 Self Yes Yes   Sig: Take 500 mg by mouth daily   vitamin B-12 (VITAMIN B-12) 1,000 mcg tablet 9/26/2024 Self Yes Yes   Sig: Take by mouth daily      Facility-Administered Medications: None     Bebtelovimab, Vancomycin, Gabapentin, Ceftazidime, and Nsaids    Objective      Temp:  [98.2 °F (36.8 °C)-98.4 °F (36.9 °C)] 98.3 °F (36.8 °C)  HR:  [73-75] 75  Resp:  [17-18] 17  BP: (120-130)/(72-99) 123/99  O2 Device: None (Room air)          I/O         09/26 0701 09/27 0700 09/27 0701 09/28 0700 09/28 0701 09/29 0700    P.O.  360     I.V. (mL/kg)  2000 (33.7)     IV Piggyback  150     Total Intake(mL/kg)  2510 (42.3)     Urine (mL/kg/hr)  1400     Stool  0     Total Output  1400     Net  +1110            Unmeasured Stool Occurrence  1 x           Lines/Drains/Airways       Active Status       Name Placement date Placement time Site Days    Port A Cath 11/25/22 Right Chest 11/25/22  0953  Chest  673    Port A Cath 11/25/22 Right Chest 11/25/22  1201  Chest  673                  Physical Exam General appearance: alert and oriented, in no acute distress, appears older than stated age, cooperative, and no  distress  Head: Normocephalic, without obvious abnormality, atraumatic  Neck: no JVD and supple, symmetrical, trachea midline  Lungs: diminished breath sounds  Heart: regular rate and rhythm, S1, S2 normal, no murmur, click, rub or gallop  Abdomen: abnormal findings:  moderate tenderness in the left flank  Extremities: extremities normal, warm and well-perfused; no cyanosis, clubbing, or edema  Pulses: 2+ and symmetric  Neurologic: Grossly normal  Internalize left ureteral stent.  No external urinary drains      Lab Results: I have reviewed the following results: CBC/BMP:   .     09/28/24  0539   WBC 6.24   HGB 8.7*   HCT 28.9*   *   SODIUM 137   K 4.8      CO2 29   BUN 31*   CREATININE 1.02   GLUC 186*      Imaging Review: Reviewed radiology reports from this admission including: CT abdomen/pelvis.  CT chest abdomen pelvis wo contrast [[id number]] Collected: 09/27/24 0902   Order Status: Completed Updated: 09/27/24 0932   Narrative:     CT CHEST, ABDOMEN AND PELVIS WITHOUT IV CONTRAST    INDICATION: cough, fever, hx lung transplant. left flank pain.    COMPARISON: CTs of the abdomen/pelvis dated 9/23/2020 4/9/2024. Chest CT dated 8/2/2024.    TECHNIQUE: CT examination of the chest, abdomen and pelvis was performed without intravenous contrast. Multiplanar 2D reformatted images were created from the source data.    This examination, like all CT scans performed in the [facility], was performed utilizing techniques to minimize radiation dose exposure, including the use of iterative reconstruction and automated exposure control. Radiation dose length  product (DLP) for this visit: 639 mGy-cm    Enteric Contrast: Not administered.    FINDINGS:    CHEST    LUNGS: Status post right pneumonectomy and left lung transplant.    New posterior left lower lobe centrilobular nodularity, focally, suggesting bronchiolitis (4/74)  Other sub-6 mm pulmonary nodules scattered throughout the left lung appear  similar to priors. No consolidative or groundglass opacities. Postsurgical  changes of the airway and distortion of the tracheal course along with the remainder of the mediastinum. No endobronchial lesions. No interstitial lung abnormalities.    PLEURA: Unremarkable.    HEART/GREAT VESSELS: Heart and aorta sit in the lateral right hemithorax after right pneumonectomy. Right central venous catheter tip at the cavoatrial junction. Chronic calcifications of the SVC and inferior right atrial wall. Hypodensity of blood pool  relative to myocardium suggesting anemia. Right ventricular papillary muscles show multifocal areas of subendocardial fat infiltration suggesting old infarct. No pericardial effusion. Thoracic aorta is distorted in course but normal in caliber.  Enlargement of the central pulmonary arterial tree which can be seen with pulmonary hypertension.    MEDIASTINUM AND BRUCE: Severe rightward displacement due to prior surgery. Distortion of the esophagus. Proximal third of the stomach extends into the right hemithorax. Prominent prevascular nodes in the superior mediastinum appears similar to prior. No  pathologically enlarged mediastinal or hilar lymphadenopathy or mass demonstrated.    CHEST WALL AND LOWER NECK: Left retropectoral saline breast implant with normal contour. Stable postsurgical findings of the right chest wall. No abnormality around the patient's right chest wall power injectable port.    ABDOMEN    LIVER/BILIARY TREE: Pericapsular surgical clips and calcifications within the inferior tip of the liver. Overall size and contour are within normal limits accounting for distortion after right pneumonectomy. No masses or biliary ductal dilatation.    GALLBLADDER: Post cholecystectomy.    SPLEEN: Mild splenomegaly measuring 13.2 cm in long axis with lobulation of the hilar contour. No focal masses.    PANCREAS: Fatty involution without mass or pancreatic ductal dilatation.    ADRENAL GLANDS:  Unremarkable.    KIDNEYS/URETERS: Small nephroliths and infundibular stones at the left lower pole. Ureteral stent proximal pigtail at the renal pelvis. No right-sided urolithiasis. No hydronephrosis on either side. Left ureteral stent distal pigtail terminates  appropriately in the bladder.    No suspicious contour distorting renal masses or perinephric collections.    STOMACH AND BOWEL: Proximal third of the stomach appears intrathoracic. Question posterior gastric diverticulum at the cardia. No gastric wall thickening. No dilated or inflamed loops of small or large bowel. Large colonic stool burden extending to the  rectum.    APPENDIX: Surgically absent.    ABDOMINOPELVIC CAVITY: No ascites. No pneumoperitoneum. No lymphadenopathy.    VESSELS: Unremarkable for patient's age.    PELVIS    REPRODUCTIVE ORGANS: Unremarkable for patient's age.    URINARY BLADDER: 2 to 3 mm calcified stones layering dependently in the bladder. No evident ureteral stones. Wall thickness within allowable limits for level of distention. No perivesicular fat stranding.    ABDOMINAL WALL/INGUINAL REGIONS: Unremarkable.    BONES: Healed median sternotomy. Stable postsurgical findings at the right rib cage. No acute fracture or suspicious osseous lesion.   Impression:       1.  Left-sided nephrolithiasis with upper collecting infundibular stones and layering stones in the urinary bladder. Bladder stones are new having migrated from the upper collecting system since prior. No stones projecting along the course of the  appropriately positioned left ureteral stent. No hydronephrosis.    2.  Mild focal bronchiolitis at the left lower lobe. No pneumonic consolidation.    Workstation performed: [id number]       Administrative Statements   I have spent a total time of 25 minutes in caring for this patient on the day of the visit/encounter including Diagnostic results, Patient and family education, Risk factor reductions, Impressions, Counseling /  Coordination of care, Documenting in the medical record, Reviewing / ordering tests, medicine, procedures  , and Communicating with other healthcare professionals .

## 2024-09-28 NOTE — RESPIRATORY THERAPY NOTE
RT Protocol Note  Maximilian RAY Sourav Mann 48 y.o. female MRN: [id number]  Unit/Bed#: -01 Encounter: [id number]    Assessment    Principal Problem:    Sepsis (HCC)  Active Problems:    Type 1 diabetes mellitus with diabetic chronic kidney disease, unspecified CKD stage (HCC)    Pancreatic insufficiency    Anemia    GERD (gastroesophageal reflux disease)    Immunosuppression (HCC)    Renal calculus, left    History of pulmonary embolus (PE)    Acute pyelonephritis    Elevated serum creatinine    Pyelonephritis      Home Pulmonary Medications:  5mg Albuterol Bid, 4ml 3% saline Bid  Home Devices/Therapy:  (Albuterol, hypertonic saline)    Past Medical History:   Diagnosis Date    Abnormal uterine bleeding (AUB)     ABPA (allergic bronchopulmonary aspergillosis) (Formerly [facility]) 07/08/2012    Acute kidney injury (HCC) 09/08/2017    Acute on chronic respiratory failure (Formerly [facility]) 03/04/2017    Last Assessment & Plan:  Formatting of this note might be different from the original. Recurrent with clinical worsening with enlarging PTX, s/p multiple chest tubes, but new fevers 6/19 and worsening leukocytosis indicating infection as cause of 6/19 decompensation with profound hypoxia in setting of shunting through right lung and continued pneumothorax s/p 2nd chest tube placement.  6/27: Right    Anaphylaxis 06/06/2022    Anemia     Asthma     Chest pain 09/21/2021    Chronic pain     Chronic respiratory failure with hypoxia (Former[facility]) 09/07/2021    COVID-19 06/03/2022    Cystic fibrosis (HCC)     Deep vein thrombosis (HCC)     Diabetes mellitus (HCC)     GERD (gastroesophageal reflux disease)     HBP (high blood pressure)     HCAP (healthcare-associated pneumonia) 03/06/2020    History of transfusion     Influenza B 02/23/2020    Kidney stone     Neutropenic fever  (HCC) 09/26/2020    Pneumonia due to Pseudomonas species (Former[facility]) 10/28/2010    Last Assessment & Plan:  Formatting of this note might be different from the original. Polymicrobial pna  w/ pseudomonas, M. Abscessus, Cadida glabrata/albicans    PONV (postoperative nausea and vomiting)     Pseudomonas aeruginosa infection 2012    Renal calculus     Squamous cell skin cancer 2023    SCCIS- Left upper thigh    Transplant recipient     Lung- 2017    Ulcerative colitis (HCC)     Wellness examination 2013    Formatting of this note might be different from the original. Discussed with patient and acknowledgement form signed on 2013 (ALYSHA)     Social History     Socioeconomic History    Marital status: /Civil Union     Spouse name: None    Number of children: None    Years of education: 12    Highest education level: None   Occupational History    Occupation: Disabled    Tobacco Use    Smoking status: Former     Current packs/day: 0.00     Average packs/day: 0.5 packs/day for 12.0 years (6.0 ttl pk-yrs)     Types: Cigarettes     Start date: 1993     Quit date: 2005     Years since quittin.7     Passive exposure: Past    Smokeless tobacco: Never   Vaping Use    Vaping status: Never Used   Substance and Sexual Activity    Alcohol use: Yes     Alcohol/week: 2.0 standard drinks of alcohol     Types: 2 Glasses of wine per week     Comment: social    Drug use: Yes     Frequency: 2.0 times per week     Types: Marijuana     Comment: medical edible marijuana prescribed    Sexual activity: Yes     Partners: Male     Birth control/protection: None   Other Topics Concern    None   Social History Narrative    Most recent tobacco use screenin2019    Do you currently or have you served in the [employer]: No    Were you activated, into active duty, as a member of the [employer] or as a Reservist: No    Occupation: disabled    Education: 12    Marital status:     Exercise level: Occasional    Diet: Specific    low sugar, high fat    General stress level: High    Alcohol intake: Occasional    Caffeine intake: Heavy    Chewing tobacco: none    Illicit  drugs: none    Guns present in home: No    Seat belts used routinely: Yes    Smoke alarm in home: Yes    Advance directive: Yes     Social Determinants of Health     Financial Resource Strain: Low Risk  (11/10/2022)    [facility]    Overall Financial Resource Strain (CARDIA)     Difficulty of Paying Living Expenses: Not hard at all   Food Insecurity: No Food Insecurity (8/17/2024)    Received from [facility]    Hunger Vital Sign     Worried About Running Out of Food in the Last Year: Never true     Ran Out of Food in the Last Year: Never true   Transportation Needs: No Transportation Needs (8/17/2024)    Received from [facility]    PRAPARE - Transportation     Lack of Transportation (Medical): No     Lack of Transportation (Non-Medical): No   Physical Activity: Insufficiently Active (1/15/2024)    Exercise Vital Sign     Days of Exercise per Week: 7 days     Minutes of Exercise per Session: 20 min   Stress: No Stress Concern Present (1/15/2024)    [employer] - Occupational Stress Questionnaire     Feeling of Stress : Only a little   Social Connections: Unknown (6/18/2024)    Received from [facility]    Social [employer] [place]     How often do you feel lonely or isolated from those around you? (Adult - for ages 18 years and over): Not on file   Intimate Partner Violence: Not At Risk (1/15/2024)    Humiliation, Afraid, Rape, and Kick questionnaire     Fear of Current or Ex-Partner: No     Emotionally Abused: No     Physically Abused: No     Sexually Abused: No   Housing Stability: Low Risk  (8/7/2024)    Housing Stability Vital Sign     Unable to Pay for Housing in the Last Year: No     Number of Times Moved in the Last Year: 0     Homeless in the Last Year: No       Subjective         Objective    Physical Exam:   General Appearance: Awake,  "Alert  Respiratory Pattern: Normal  Chest Assessment: Chest expansion symmetrical  Bilateral Breath Sounds: Diminished  Cough: None  O2 Device: room air    Vitals:  Blood pressure 132/71, pulse 74, temperature (!) 97.4 °F (36.3 °C), resp. rate 19, height 5' 2.5\" (1.588 m), weight 59.4 kg (130 lb 14.4 oz), SpO2 94%, not currently breastfeeding.          Imaging and other studies:     O2 Device: room air     Plan    Respiratory Plan: Home Bronchodilator Patient pathway        Resp Comments: pt with hx of asthma anf CF with nebulizer use at home, hx of lung transplant also, respirations are even and non labored with bilat diminished breath sounds, strong NPC, pt denies use of vest/flutter or need for secretion clearance assistance, continue with Bid aerosol per home regimen  "

## 2024-09-28 NOTE — PROGRESS NOTES
Progress Note - Infectious Disease   Maximilian RAY Sourav Mann 48 y.o. female MRN: [id number]  Unit/Bed#: -01 Encounter: [id number]      Impression/Recommendations:  1.  SIRS, with fever and leukocytosis.  I suspect it is secondary to migrating nephrolithiasis but consider pyelonephritis also.  Patient is clinically improved after passing left renal stone.  Temperature is down.  WBC normalized.  She remains systemically well, without toxicity and hemodynamically stable, without hypotension.  Admission blood cultures have no growth thus far.  Anti-infective plan as in below.  Monitor temperature/WBC.  Follow-up on admission blood cultures.    2.  Left-sided pyelonephritis versus migrating nephrolithiasis.  Abdomen/pelvis CT without obvious pyelonephritis.  Left flank pain is much improved after passing a kidney stone.  Prior urine culture with growth of Candida.  Current urine culture with mixed contaminants.  With patient clinically improved, will de-escalate anti-infective regimen.  If patient remains well, anti-infective can be transitioned to p.o. and have her complete an empiric course for possible UTI.  De-escalate antibiotic regimen from IV cefepime to IV ceftriaxone.  Continue fluconazole but decrease dose to 200 mg daily.  If patient continues to do well clinically, afebrile and blood culture with no growth, transition to p.o. cefpodoxime/p.o. fluconazole in 24 hours.  Treat x 7-day course.    3.  Left-sided nephrolithiasis, status post ureteral stent placement.  Patient passed a kidney stone earlier.  Urology follow-up.    4.  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  Continue immunosuppressive regimen.  Continue outpatient prophylactic anti-infectives.    5.  GEOFF on admission, possibly secondary to obstructed renal stone.  Creatinine is much improved after passing kidney stone.  Anti-infectives at full dose.  Monitor creatinine.    Discussed with patient in detail regarding the  above plan.  Discussed with WAYNE Carter from primary service regarding anti-infective de-escalation above.  She is in agreement.    Antibiotics:  Cefepime/fluconazole # 2    Subjective:  Patient had severe left flank pain overnight.  She subsequently passed a kidney stone.  Left flank pain is much improved today.  Temperature is down.  No chills.  She is tolerating antibiotics well.  No nausea, vomiting or diarrhea.    Objective:  Vitals:  Temp:  [98.2 °F (36.8 °C)-98.4 °F (36.9 °C)] 98.3 °F (36.8 °C)  HR:  [73-75] 75  Resp:  [17-18] 17  BP: (120-130)/(72-99) 123/99  SpO2:  [93 %-98 %] 94 %  Temp (24hrs), Av.3 °F (36.8 °C), Min:98.2 °F (36.8 °C), Max:98.4 °F (36.9 °C)  Current: Temperature: 98.3 °F (36.8 °C)    Physical Exam:     General: Awake, alert, cooperative, no distress.   Neck:  Supple. No mass.  No lymphadenopathy.   Lungs: Expansion symmetric, no rales, no wheezing, respirations unlabored.   Heart:  Regular rate and rhythm, S1 and S2 normal, no murmur.   Abdomen: Soft, nondistended, left flank tenderness, bowel sounds active all four quadrants, no masses, no organomegaly.   Extremities: No edema. No erythema/warmth. No ulcer. Nontender to palpation.   Skin:  No rash.   Neuro: Moves all extremities.     Invasive Devices       Central Venous Catheter Line  Duration             Port A Cath 22 Right Chest 673 days    Port A Cath 22 Right Chest 673 days                    Labs studies:   I have personally reviewed pertinent labs.  Results from last 7 days   Lab Units 24  0539 24  0840 24  0544   POTASSIUM mmol/L 4.8 4.5 4.0   CHLORIDE mmol/L 102 101 100   CO2 mmol/L 29 28 31   BUN mg/dL 31* 46* 40*   CREATININE mg/dL 1.02 1.32* 1.11   EGFR ml/min/1.73sq m 66 48 59   CALCIUM mg/dL 8.1* 8.7 8.2*   AST U/L 11* 13  --    ALT U/L 11 12  --    ALK PHOS U/L 58 59  --      Results from last 7 days   Lab Units 24  0539 24  0840 24  0544   WBC Thousand/uL 6.24  13.45* 6.48   HEMOGLOBIN g/dL 8.7* 9.9* 8.9*   PLATELETS Thousands/uL 147* 170 142*     Results from last 7 days   Lab Units 09/27/24  1003 09/27/24  0950 09/27/24  0940 09/25/24  1001 09/22/24  1338 09/22/24  0927 09/22/24  0926   BLOOD CULTURE   --  No Growth at 24 hrs. No Growth at 24 hrs. No Growth at 72 hrs.  --  No Growth After 5 Days. No Growth After 5 Days.   URINE CULTURE  40,000-49,000 cfu/ml  --   --   --  40,000-49,000 cfu/ml  --   --        Imaging Studies:   I have personally reviewed pertinent imaging study reports and images in PACS.    EKG, Pathology, and Other Studies:   I have personally reviewed pertinent reports.

## 2024-09-28 NOTE — ASSESSMENT & PLAN NOTE
Lab Results   Component Value Date    HGBA1C 7.5 (H) 09/21/2024       Recent Labs     09/27/24  2035 09/28/24  0708 09/28/24  1109 09/28/24  1609   POCGLU 198* 167* 76 247*       Blood Sugar Average: Last 72 hrs:  (P) 184

## 2024-09-28 NOTE — ASSESSMENT & PLAN NOTE
Lab Results   Component Value Date    HGBA1C 7.5 (H) 09/21/2024       Recent Labs     09/27/24  1548 09/27/24  2035 09/28/24  0708 09/28/24  1109   POCGLU 232* 198* 167* 76       Blood Sugar Average: Last 72 hrs:  (P) 168.25  Home regimen: Basal insulin 12 units every morning.  Patient unsure bolus regimen given her prednisone has been being tapered.  Continue home basal insulin  5 units lispro with meals plus sliding scale for now

## 2024-09-28 NOTE — ASSESSMENT & PLAN NOTE
Modest serum creatinine elevation on presentation secondary to physiologic response to infection and hydration status.  Creatinine at baseline.

## 2024-09-29 VITALS
OXYGEN SATURATION: 92 % | BODY MASS INDEX: 23.2 KG/M2 | SYSTOLIC BLOOD PRESSURE: 171 MMHG | HEIGHT: 63 IN | DIASTOLIC BLOOD PRESSURE: 91 MMHG | WEIGHT: 130.9 LBS | RESPIRATION RATE: 17 BRPM | HEART RATE: 63 BPM | TEMPERATURE: 97.9 F

## 2024-09-29 LAB
ANION GAP SERPL CALCULATED.3IONS-SCNC: 5 MMOL/L (ref 4–13)
BACTERIA BLD CULT: NORMAL
BUN SERPL-MCNC: 25 MG/DL (ref 5–25)
CALCIUM SERPL-MCNC: 8.4 MG/DL (ref 8.4–10.2)
CHLORIDE SERPL-SCNC: 103 MMOL/L (ref 96–108)
CO2 SERPL-SCNC: 30 MMOL/L (ref 21–32)
CREAT SERPL-MCNC: 1.09 MG/DL (ref 0.6–1.3)
ERYTHROCYTE [DISTWIDTH] IN BLOOD BY AUTOMATED COUNT: 14.1 % (ref 11.6–15.1)
GFR SERPL CREATININE-BSD FRML MDRD: 61 ML/MIN/1.73SQ M
GLUCOSE SERPL-MCNC: 102 MG/DL (ref 65–140)
GLUCOSE SERPL-MCNC: 229 MG/DL (ref 65–140)
GLUCOSE SERPL-MCNC: 249 MG/DL (ref 65–140)
HCT VFR BLD AUTO: 28.8 % (ref 34.8–46.1)
HGB BLD-MCNC: 8.6 G/DL (ref 11.5–15.4)
MAGNESIUM SERPL-MCNC: 2 MG/DL (ref 1.9–2.7)
MCH RBC QN AUTO: 29.1 PG (ref 26.8–34.3)
MCHC RBC AUTO-ENTMCNC: 29.9 G/DL (ref 31.4–37.4)
MCV RBC AUTO: 97 FL (ref 82–98)
PLATELET # BLD AUTO: 156 THOUSANDS/UL (ref 149–390)
PMV BLD AUTO: 10.3 FL (ref 8.9–12.7)
POTASSIUM SERPL-SCNC: 4.9 MMOL/L (ref 3.5–5.3)
RBC # BLD AUTO: 2.96 MILLION/UL (ref 3.81–5.12)
SODIUM SERPL-SCNC: 138 MMOL/L (ref 135–147)
TACROLIMUS BLD-MCNC: 7.1 NG/ML (ref 3–15)
WBC # BLD AUTO: 4.63 THOUSAND/UL (ref 4.31–10.16)

## 2024-09-29 PROCEDURE — 99232 SBSQ HOSP IP/OBS MODERATE 35: CPT | Performed by: INTERNAL MEDICINE

## 2024-09-29 PROCEDURE — 83735 ASSAY OF MAGNESIUM: CPT | Performed by: NURSE PRACTITIONER

## 2024-09-29 PROCEDURE — 82948 REAGENT STRIP/BLOOD GLUCOSE: CPT

## 2024-09-29 PROCEDURE — 94760 N-INVAS EAR/PLS OXIMETRY 1: CPT

## 2024-09-29 PROCEDURE — 80048 BASIC METABOLIC PNL TOTAL CA: CPT | Performed by: NURSE PRACTITIONER

## 2024-09-29 PROCEDURE — 94640 AIRWAY INHALATION TREATMENT: CPT

## 2024-09-29 PROCEDURE — 85027 COMPLETE CBC AUTOMATED: CPT | Performed by: NURSE PRACTITIONER

## 2024-09-29 RX ORDER — FLUCONAZOLE 200 MG/1
200 TABLET ORAL DAILY
Qty: 5 TABLET | Refills: 0 | Status: SHIPPED | OUTPATIENT
Start: 2024-09-30 | End: 2024-09-29

## 2024-09-29 RX ORDER — FLUCONAZOLE 200 MG/1
200 TABLET ORAL DAILY
Qty: 5 TABLET | Refills: 0 | Status: SHIPPED | OUTPATIENT
Start: 2024-09-30 | End: 2024-10-05

## 2024-09-29 RX ORDER — HEPARIN SODIUM,PORCINE/PF 10 UNIT/ML
10 SYRINGE (ML) INTRAVENOUS ONCE
Status: COMPLETED | OUTPATIENT
Start: 2024-09-29 | End: 2024-09-29

## 2024-09-29 RX ORDER — CEFPODOXIME PROXETIL 200 MG/1
200 TABLET, FILM COATED ORAL 2 TIMES DAILY
Qty: 10 TABLET | Refills: 0 | Status: SHIPPED | OUTPATIENT
Start: 2024-09-29 | End: 2024-09-29

## 2024-09-29 RX ORDER — CEFPODOXIME PROXETIL 200 MG/1
200 TABLET, FILM COATED ORAL 2 TIMES DAILY
Qty: 10 TABLET | Refills: 0 | Status: SHIPPED | OUTPATIENT
Start: 2024-09-29 | End: 2024-10-04

## 2024-09-29 RX ADMIN — OXYCODONE HYDROCHLORIDE 10 MG: 10 TABLET ORAL at 07:53

## 2024-09-29 RX ADMIN — HYDROMORPHONE HYDROCHLORIDE 0.5 MG: 1 INJECTION, SOLUTION INTRAMUSCULAR; INTRAVENOUS; SUBCUTANEOUS at 05:35

## 2024-09-29 RX ADMIN — PHENAZOPYRIDINE 100 MG: 100 TABLET ORAL at 12:49

## 2024-09-29 RX ADMIN — OXYCODONE HYDROCHLORIDE 10 MG: 10 TABLET ORAL at 03:47

## 2024-09-29 RX ADMIN — PHENAZOPYRIDINE 100 MG: 100 TABLET ORAL at 07:53

## 2024-09-29 RX ADMIN — Medication 5000 UNITS: at 08:52

## 2024-09-29 RX ADMIN — B-COMPLEX W/ C & FOLIC ACID TAB 1 TABLET: TAB at 08:53

## 2024-09-29 RX ADMIN — Medication 100 UNITS: at 12:58

## 2024-09-29 RX ADMIN — ALBUTEROL SULFATE 5 MG: 2.5 SOLUTION RESPIRATORY (INHALATION) at 07:16

## 2024-09-29 RX ADMIN — VALACYCLOVIR HYDROCHLORIDE 500 MG: 500 TABLET, FILM COATED ORAL at 08:53

## 2024-09-29 RX ADMIN — INSULIN LISPRO 2 UNITS: 100 INJECTION, SOLUTION INTRAVENOUS; SUBCUTANEOUS at 07:55

## 2024-09-29 RX ADMIN — ATOVAQUONE 1500 MG: 750 SUSPENSION ORAL at 08:56

## 2024-09-29 RX ADMIN — CYANOCOBALAMIN TAB 500 MCG 1000 MCG: 500 TAB at 08:53

## 2024-09-29 RX ADMIN — POLYETHYLENE GLYCOL 3350 17 G: 17 POWDER, FOR SOLUTION ORAL at 08:49

## 2024-09-29 RX ADMIN — SODIUM CHLORIDE SOLN NEBU 3% 4 ML: 3 NEBU SOLN at 07:16

## 2024-09-29 RX ADMIN — INSULIN GLARGINE 12 UNITS: 100 INJECTION, SOLUTION SUBCUTANEOUS at 08:48

## 2024-09-29 RX ADMIN — INSULIN LISPRO 2 UNITS: 100 INJECTION, SOLUTION INTRAVENOUS; SUBCUTANEOUS at 07:54

## 2024-09-29 RX ADMIN — HYDROMORPHONE HYDROCHLORIDE 0.5 MG: 1 INJECTION, SOLUTION INTRAMUSCULAR; INTRAVENOUS; SUBCUTANEOUS at 09:26

## 2024-09-29 RX ADMIN — DEXLANSOPRAZOLE 60 MG: 60 CAPSULE, DELAYED RELEASE ORAL at 08:53

## 2024-09-29 RX ADMIN — PREGABALIN 150 MG: 75 CAPSULE ORAL at 08:52

## 2024-09-29 RX ADMIN — PANCRELIPASE 48000 UNITS: 120000; 24000; 76000 CAPSULE, DELAYED RELEASE PELLETS ORAL at 07:54

## 2024-09-29 RX ADMIN — TACROLIMUS 2 MG: 0.5 CAPSULE ORAL at 08:50

## 2024-09-29 RX ADMIN — APIXABAN 5 MG: 5 TABLET, FILM COATED ORAL at 08:53

## 2024-09-29 RX ADMIN — FLUCONAZOLE 200 MG: 100 TABLET ORAL at 08:53

## 2024-09-29 RX ADMIN — PANCRELIPASE 48000 UNITS: 120000; 24000; 76000 CAPSULE, DELAYED RELEASE PELLETS ORAL at 12:49

## 2024-09-29 RX ADMIN — PREDNISONE 15 MG: 5 TABLET ORAL at 08:52

## 2024-09-29 NOTE — PLAN OF CARE
Problem: PAIN - ADULT  Goal: Verbalizes/displays adequate comfort level or baseline comfort level  Description: Interventions:  - Encourage patient to monitor pain and request assistance  - Assess pain using appropriate pain scale  - Administer analgesics based on type and severity of pain and evaluate response  - Implement non-pharmacological measures as appropriate and evaluate response  - Consider cultural and social influences on pain and pain management  - Notify physician/advanced practitioner if interventions unsuccessful or patient reports new pain  Outcome: Progressing     Problem: INFECTION - ADULT  Goal: Absence or prevention of progression during hospitalization  Description: INTERVENTIONS:  - Assess and monitor for signs and symptoms of infection  - Monitor lab/diagnostic results  - Monitor all insertion sites, i.e. indwelling lines, tubes, and drains  - Monitor endotracheal if appropriate and nasal secretions for changes in amount and color  - [place] appropriate cooling/warming therapies per order  - Administer medications as ordered  - Instruct and encourage patient and family to use good hand hygiene technique  - Identify and instruct in appropriate isolation precautions for identified infection/condition  Outcome: Progressing

## 2024-09-29 NOTE — PROGRESS NOTES
Progress Note - Infectious Disease   Maximilian RAY Sourav Mann 48 y.o. female MRN: [id number]  Unit/Bed#: -01 Encounter: [id number]      Impression/Recommendations:  1.  SIRS, with fever and leukocytosis.  I suspect it is secondary to migrating nephrolithiasis but consider pyelonephritis also.  Patient is clinically improved after passing left renal stone.  Temperature is down.  WBC normalized.  She remains systemically well, without toxicity and hemodynamically stable, without hypotension.  Admission blood cultures have no growth thus far.  Anti-infective plan as in below.  Monitor temperature/WBC.  Follow-up on admission blood cultures.     2.  Left-sided pyelonephritis versus migrating nephrolithiasis.  Abdomen/pelvis CT without obvious pyelonephritis.  Left flank pain is much improved after passing a kidney stone.  Prior urine culture with growth of Candida.  Current urine culture with mixed contaminants.  Patient remains clinically well with antibiotic de-escalation.  Continue ceftriaxone/fluconazole.  Transition to p.o. cefpodoxime/p.o. fluconazole at discharge.  Treat x 7-day course, through 10/3.     3.  Left-sided nephrolithiasis, status post ureteral stent placement.  Patient passed a kidney stone earlier.  Urology follow-up.     4.  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  Continue immunosuppressive regimen.  Continue outpatient prophylactic anti-infectives.     5.  GEOFF on admission, possibly secondary to obstructed renal stone.  Creatinine is much improved after passing kidney stone.  Anti-infectives at full dose.  Monitor creatinine.     Discussed with patient in detail regarding the above plan.  Discussed with WAYNE Carter from primary service regarding anti-infective plan above and ID clearance for discharge.  She is in agreement.     Antibiotics:  Ceftriaxone/fluconazole # 3     Subjective:  Patient feels well.  Left flank pain mild and improved from  admission.  Temperature stays down.  No chills.  She is tolerating antibiotics well.  No nausea, vomiting or diarrhea.     Objective:  Vitals:  Temp:  [97.9 °F (36.6 °C)-98.3 °F (36.8 °C)] 97.9 °F (36.6 °C)  HR:  [63-75] 63  Resp:  [17] 17  BP: (123-171)/(82-99) 171/91  SpO2:  [92 %-94 %] 92 %  Temp (24hrs), Av.1 °F (36.7 °C), Min:97.9 °F (36.6 °C), Max:98.3 °F (36.8 °C)  Current: Temperature: 97.9 °F (36.6 °C)    Physical Exam:     General: Awake, alert, cooperative, no distress.   Neck:  Supple. No mass.  No lymphadenopathy.   Lungs: Expansion symmetric, no rales, no wheezing, respirations unlabored.   Heart:  Regular rate and rhythm, S1 and S2 normal, no murmur.   Abdomen: Soft, nondistended, stable mild left flank tenderness, bowel sounds active all four quadrants, no masses, no organomegaly.   Extremities: No edema. No erythema/warmth. No ulcer. Nontender to palpation.   Skin:  No rash.   Neuro: Moves all extremities.     Invasive Devices       Central Venous Catheter Line  Duration             Port A Cath 22 Right Chest 673 days    Port A Cath 22 Right Chest 673 days                    Labs studies:   I have personally reviewed pertinent labs.  Results from last 7 days   Lab Units 24  0538 24  0539 24  0840   POTASSIUM mmol/L 4.9 4.8 4.5   CHLORIDE mmol/L 103 102 101   CO2 mmol/L 30 29 28   BUN mg/dL 25 31* 46*   CREATININE mg/dL 1.09 1.02 1.32*   EGFR ml/min/1.73sq m 61 66 48   CALCIUM mg/dL 8.4 8.1* 8.7   AST U/L  --  11* 13   ALT U/L  --  11 12   ALK PHOS U/L  --  58 59     Results from last 7 days   Lab Units 24  0538 24  0539 24  0840   WBC Thousand/uL 4.63 6.24 13.45*   HEMOGLOBIN g/dL 8.6* 8.7* 9.9*   PLATELETS Thousands/uL 156 147* 170     Results from last 7 days   Lab Units 24  1003 24  0950 24  0940 24  1001 24  1338   BLOOD CULTURE   --  No Growth at 24 hrs. No Growth at 24 hrs. No Growth at 72 hrs.  --    URINE  CULTURE  40,000-49,000 cfu/ml  --   --   --  40,000-49,000 cfu/ml       Imaging Studies:   I have personally reviewed pertinent imaging study reports and images in PACS.    EKG, Pathology, and Other Studies:   I have personally reviewed pertinent reports.

## 2024-09-30 DIAGNOSIS — K21.00 GASTROESOPHAGEAL REFLUX DISEASE WITH ESOPHAGITIS WITHOUT HEMORRHAGE: ICD-10-CM

## 2024-09-30 LAB — BACTERIA BLD CULT: NORMAL

## 2024-09-30 RX ORDER — FAMOTIDINE 40 MG/1
40 TABLET, FILM COATED ORAL
Qty: 30 TABLET | Refills: 5 | Status: SHIPPED | OUTPATIENT
Start: 2024-09-30

## 2024-09-30 NOTE — UTILIZATION REVIEW
NOTIFICATION OF INPATIENT ADMISSION   AUTHORIZATION REQUEST   SERVICING FACILITY:   [place] [address]  Tax ID: [id number]  NPI: [id number] ATTENDING PROVIDER:  Attending Name and NPI#: Jhonatan Eaton Md [[id number]]  Address: [address] [place] [address]  Phone: [phone]     ADMISSION INFORMATION:  Place of Service: Inpatient [facility]  Place of Service Code: 21  Inpatient Admission Date/Time: 9/27/24  2:00 PM  Discharge Date/Time: 9/29/2024  2:40 PM  Admitting Diagnosis Code/Description:  UTI (urinary tract infection) [N39.0]  Fever [R50.9]  Hx of lung transplant (HCC) [Z94.2]     UTILIZATION REVIEW CONTACT:  Tata Moreno, Utilization   Network Utilization Review Department  Phone: [phone]  Fax [phone]  Email: [email or web address]  Contact for approvals/pending authorizations, clinical reviews, and discharge.     PHYSICIAN ADVISORY SERVICES:  Medical Necessity Denial & [employer] Review  Phone: [phone]  Fax: [phone]  Email: [email or web address]     DISCHARGE SUPPORT TEAM:  For Patients Discharge Needs & Updates  Phone: [phone] opt. 2 Fax: [phone]  Email: [email or web address]

## 2024-10-01 ENCOUNTER — OFFICE VISIT (OUTPATIENT)
Dept: INTERNAL MEDICINE CLINIC | Facility: CLINIC | Age: 49
End: 2024-10-01

## 2024-10-01 VITALS
OXYGEN SATURATION: 98 % | BODY MASS INDEX: 22.86 KG/M2 | SYSTOLIC BLOOD PRESSURE: 138 MMHG | RESPIRATION RATE: 18 BRPM | HEART RATE: 77 BPM | HEIGHT: 63 IN | TEMPERATURE: 95.9 F | DIASTOLIC BLOOD PRESSURE: 90 MMHG | WEIGHT: 129 LBS

## 2024-10-01 DIAGNOSIS — N10 ACUTE PYELONEPHRITIS: ICD-10-CM

## 2024-10-01 DIAGNOSIS — N20.0 RENAL CALCULUS, LEFT: Primary | ICD-10-CM

## 2024-10-01 DIAGNOSIS — G89.4 CHRONIC PAIN DISORDER: ICD-10-CM

## 2024-10-01 DIAGNOSIS — D50.8 OTHER IRON DEFICIENCY ANEMIA: ICD-10-CM

## 2024-10-01 DIAGNOSIS — R94.6 THYROID FUNCTION TEST ABNORMAL: ICD-10-CM

## 2024-10-01 DIAGNOSIS — E84.9 CYSTIC FIBROSIS (HCC): ICD-10-CM

## 2024-10-01 PROBLEM — R50.9 FEVER: Status: RESOLVED | Noted: 2024-09-22 | Resolved: 2024-10-01

## 2024-10-01 PROBLEM — E44.0 MODERATE PROTEIN-CALORIE MALNUTRITION (HCC): Status: RESOLVED | Noted: 2022-01-08 | Resolved: 2024-10-01

## 2024-10-01 LAB
CALCIUM OXALATE DIHYDRATE MFR STONE IR: 10 %
COLOR STONE: NORMAL
COM MFR STONE: 90 %
COMMENT-STONE3: NORMAL
COMPOSITION: NORMAL
LABORATORY COMMENT REPORT: NORMAL
PHOTO: NORMAL
SIZE STONE: NORMAL MM
SPEC SOURCE SUBJ: NORMAL
STONE ANALYSIS-IMP: NORMAL
STONE ANALYSIS-IMP: NORMAL
WT STONE: 42 MG

## 2024-10-01 RX ORDER — FUROSEMIDE 20 MG
40 TABLET ORAL DAILY
COMMUNITY

## 2024-10-01 RX ORDER — OXYCODONE HYDROCHLORIDE 10 MG/1
10 TABLET ORAL SEE ADMIN INSTRUCTIONS
Qty: 180 TABLET | Refills: 0 | Status: SHIPPED | OUTPATIENT
Start: 2024-10-03 | End: 2024-11-02

## 2024-10-01 NOTE — ASSESSMENT & PLAN NOTE
Problem: Discharge Planning  Goal: Discharge to home or other facility with appropriate resources  6/27/2022 0537 by Jake Neville RN  Outcome: Progressing  6/26/2022 1557 by Leslie Osei RN  Outcome: Progressing  Flowsheets (Taken 6/26/2022 1221)  Discharge to home or other facility with appropriate resources:   Identify barriers to discharge with patient and caregiver   Arrange for needed discharge resources and transportation as appropriate   Identify discharge learning needs (meds, wound care, etc)   Refer to discharge planning if patient needs post-hospital services based on physician order or complex needs related to functional status, cognitive ability or social support system     Problem: Pain  Goal: Verbalizes/displays adequate comfort level or baseline comfort level  6/27/2022 0537 by Jake Neville RN  Outcome: Progressing  6/26/2022 1557 by Leslie Osei RN  Outcome: Progressing     Problem: Skin/Tissue Integrity  Goal: Absence of new skin breakdown  Description: 1. Monitor for areas of redness and/or skin breakdown  2. Assess vascular access sites hourly  3. Every 4-6 hours minimum:  Change oxygen saturation probe site  4. Every 4-6 hours:  If on nasal continuous positive airway pressure, respiratory therapy assess nares and determine need for appliance change or resting period. 6/27/2022 0537 by Jake Neville RN  Outcome: Progressing  6/26/2022 1557 by Leslie Osei RN  Outcome: Progressing     Problem: Safety - Adult  Goal: Free from fall injury  6/27/2022 0537 by Jake Neville RN  Outcome: Progressing  6/26/2022 1557 by Leslie Osei RN  Outcome: Progressing     Problem: ABCDS Injury Assessment  Goal: Absence of physical injury  6/27/2022 0537 by Jake Neville RN  Outcome: Progressing  6/26/2022 1557 by Leslie Osei RN  Outcome: Progressing     Problem: Confusion  Goal: Confusion, delirium, dementia, or psychosis is improved or at baseline  Description: INTERVENTIONS:  1.  Assess for possible Status post lithotripsy and stent placement 9/20  Complicated by pyelonephritis  Passed a stone noted to hospital 9/26  Follow-up with urology          contributors to thought disturbance, including medications, impaired vision or hearing, underlying metabolic abnormalities, dehydration, psychiatric diagnoses, and notify attending LIP  2. [place] high risk fall precautions, as indicated  3. Provide frequent short contacts to provide reality reorientation, refocusing and direction  4. Decrease environmental stimuli, including noise as appropriate  5. Monitor and intervene to maintain adequate nutrition, hydration, elimination, sleep and activity  6. If unable to ensure safety without constant attention obtain sitter and review sitter guidelines with assigned personnel  7.  Initiate Psychosocial CNS and Spiritual Care consult, as indicated  6/27/2022 0537 by Luciano Parrish, RN  Outcome: Progressing  6/26/2022 1557 by Chelsea Silveira RN  Outcome: Not Progressing

## 2024-10-01 NOTE — ASSESSMENT & PLAN NOTE
No active blood loss ,patient does have menorrhagia with her menses.  Iron panel  : Last 2023 showed 5 mm adenomatous polyp which was removed.  Repeat in 2026  -9/28 iron panel showed elevated ferritin of 22, serum saturation of 7, TIBC 252  Previously on iron fusions    Patient has constipation due to chronic opoid use, so oral iron would not be appropriate.  Plan to resume iron infusions if cleared by [facility]  Check CBC prior to next visit  Orders:    CBC and differential; Future    CBC and differential

## 2024-10-01 NOTE — PROGRESS NOTES
Transition of Care Visit  Name: Maximilian Mann      : 1975      MRN: [id number]  Encounter Provider: Nancie Doherty MD  Encounter Date: 10/1/2024   Encounter department: [facility] INTERNAL MEDICINE [place]    Assessment & Plan  Renal calculus, left  Status post lithotripsy and stent placement   Complicated by pyelonephritis  Passed a stone noted to hospital   Follow-up with urology         Chronic pain disorder  Continue oxycodone       Acute pyelonephritis  Admission - for acute pyelonephritis  Currently on cefpodoxime and fluconazole  Completed course of antibiotics and antifungals  Patient isolated ED for worsening flank pain or hematuria.       Other iron deficiency anemia  No active blood loss ,patient does have menorrhagia with her menses.  Iron panel  : Last  showed 5 mm adenomatous polyp which was removed.  Repeat in   - iron panel showed elevated ferritin of 22, serum saturation of 7, TIBC 252  Previously on iron fusions    Patient has constipation due to chronic opoid use, so oral iron would not be appropriate.  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   recurrent admissions for pneumonia and Pseudomonas/MSSA, also s/p pneumonectomy, following up with [facility].  Also has a history of severe GERD s/p fundoplication in , anxiety, type 1 diabetes, pulmonary hypertension,  CKD 3, nephrolithiasis in 2011 status post lithotripsy, pancreatic insufficiency, left lower extremity DVT on Eliquis.    Patient presents today for TCM visit  Patient initially presented 9/20 at [facility] with CVA left upper quadrant pain radiating to the groin. CT stone protocol showed 1.5 x 0.8 x 1.2 cm left renal calculi mild hydronephrosis.  She underwent cystoscopy, uteroscopy with lithotripsy, retrograde pyelogram and insertion of left ureteral stent as well as a basket extraction same day 9/20.  Patient was transferred to [place] for intervention.  2 days close admission developed fever.  Empirically started on IV ceftriaxone.  Urine culture showed mixed contaminants, blood culture with no growth  Completed 3-day course of IV ceftriaxone.  She was discharged 9/26 to follow-up with urology.  Patient presented again to [facility] 9/27 with fever.  Repeat CT abdomen pelvis showed fragmented stones without hydronephrosis.  She was evaluated by infectious disease and treated for left-sided pyelonephritis-initially on cefepime but de-escalated to IV ceftriaxone.  Fluconazole was added based off of prior urine cultures which grew Candida.  Patient was discharged on cefpodoxime and fluconazole to complete a 7-day course.  She was discharged to home 9/29.  Patient reports no fevers.  She continues to have flank pain radiating to the groin, but improved.  Still has increased frequency and burning.  Denies hematuria  Encounter today discussed continuation of antibiotics to complete course through 10/3  Follow-up with urology for stent removal  Reviewed blood work patient has downtrending anemia which will need to be followed up.  Was previously on iron infusions with improvement.  No active bleeding except for monthly menses with occasional menorrhagia.  Repeat colonoscopy is due in 2026    She also had bilateral ankle edema, not felt to be of cardiac etiology but from venous stasis as  well as steroid related. She was started on Lasix 40 mg daily by [facility] x2 weeks. Shew endorse swelling gets worse during the day and gets better when feet are elevated. In addition to using stockings, recommended increasing protein in diet given low albumin levels which could help.    No significant medication changes have been made  We will follow-up with urology, endocrinology for type 1 diabetes, continue post trasplant care with [facility]    RTC In one month for Annual physical            Transitional Care Management Review:   Maximilian Mann is a 48 y.o. female here for TCM follow up.     During the TCM phone call patient stated:  TCM Call       Date and time call was made  9/27/2024  2:37 PM    Hospital care reviewed  Records reviewed    Patient was hospitialized at  [facility]    Comment  DC TO [facility]    Date of Admission  09/20/24    Date of discharge  09/29/24    Diagnosis  shortness of breath    Disposition  Home    Were the patients medications reviewed and updated  Yes    Current Symptoms  Middle abdominal pain    Middle abdominal pain onset  Gradual          TCM Call       Post hospital issues  Reduced activity    Should patient be enrolled in anticoag monitoring?  Yes    Scheduled for follow up?  Yes    Not clinically warranted  DC TO [facility]    Did you obtain your prescribed medications  Yes    Do you need help managing your prescriptions or medications  No    Is transportation to your appointment needed  No    I have advised the patient to call PCP with any new or worsening symptoms  RAFIQ WALKER MA    Living Arrangements  Family members    Support System  Family    The type of support provided  Emotional; Financial; Physical    Do you have social support  Yes, as much as I need    Are you recieving any outpatient services  No    Are you recieving home care services  No    Are you using any community resources  No    Current waiver services  No    Have you fallen in the last 12 months  No     "Interperter language line needed  No    Counseling  Family          HPI  Review of Systems  Objective     /90 (BP Location: Left arm, Patient Position: Sitting, Cuff Size: Adult)   Pulse 77   Temp (!) 95.9 °F (35.5 °C) (Tympanic)   Resp 18   Ht 5' 2.5\" (1.588 m)   Wt 58.5 kg (129 lb)   SpO2 98%   BMI 23.22 kg/m²     Physical Exam  Vitals and nursing note reviewed.   Constitutional:       General: She is not in acute distress.     Appearance: She is well-developed. She is not toxic-appearing.   HENT:      Head: Normocephalic and atraumatic.   Eyes:      Conjunctiva/sclera: Conjunctivae normal.   Cardiovascular:      Rate and Rhythm: Normal rate and regular rhythm.      Heart sounds: Normal heart sounds. No murmur heard.  Pulmonary:      Effort: Pulmonary effort is normal. No respiratory distress.      Breath sounds: Normal breath sounds.   Abdominal:      General: There is distension.      Palpations: Abdomen is soft.      Tenderness: There is no abdominal tenderness. There is left CVA tenderness. There is no guarding.      Comments: Left flank tenderness, left groin tenderness without rebound    Musculoskeletal:         General: No swelling.      Cervical back: Neck supple.      Right lower leg: Edema present.      Left lower leg: Edema present.      Comments: Clubbing present   Skin:     General: Skin is warm and dry.      Capillary Refill: Capillary refill takes less than 2 seconds.   Neurological:      General: No focal deficit present.      Mental Status: She is alert.      Comments: Tremors     Psychiatric:         Mood and Affect: Mood normal.       Medications have been reviewed by provider in current encounter    Administrative Statements         "

## 2024-10-01 NOTE — UTILIZATION REVIEW
NOTIFICATION OF ADMISSION DISCHARGE   This is a Notification of Discharge from [facility]. Please be advised that this patient has been discharge from our facility. Below you will find the admission and discharge date and time including the patient’s disposition.   UTILIZATION REVIEW CONTACT:  Nisha Moreno  Utilization   Network Utilization Review Department  Phone: [phone] x carefully listen to the prompts. All voicemails are confidential.  Email: [email or web address]     ADMISSION INFORMATION  PRESENTATION DATE: 9/27/2024  7:45 AM  OBERVATION ADMISSION DATE: N/A  INPATIENT ADMISSION DATE: 9/27/24  2:00 PM   DISCHARGE DATE: 9/29/2024  2:40 PM   DISPOSITION:Home/Self Care    Network Utilization Review Department  ATTENTION: Please call with any questions or concerns to [phone] and carefully listen to the prompts so that you are directed to the right person. All voicemails are confidential.   For Discharge needs, contact Care Management DC Support Team at [phone] opt. 2  Send all requests for admission clinical reviews, approved or denied determinations and any other requests to dedicated fax number below belonging to the campus where the patient is receiving treatment. List of dedicated fax numbers for the Facilities:  FACILITY NAME UR FAX NUMBER   ADMISSION DENIALS (Administrative/Medical Necessity) [phone]   DISCHARGE SUPPORT TEAM ([facility]) [phone]   PARENT CHILD HEALTH (Maternity/NICU/Pediatrics) [phone]   [facility] [phone]   [facility] [phone]   [facility] [phone]   [facility] [phone]   [facility] [phone]   [facility] [phone]   [facility] [phone]   [facility]  [phone]   [facility] [phone]   [facility] [phone]   [facility] [phone]   [facility] [phone]

## 2024-10-01 NOTE — ASSESSMENT & PLAN NOTE
Admission 9/26-9/29 for acute pyelonephritis  Currently on cefpodoxime and fluconazole  Completed course of antibiotics and antifungals  Patient isolated ED for worsening flank pain or hematuria.

## 2024-10-01 NOTE — PROGRESS NOTES
Transition of Care Visit  Name: Maximilian Mann      : 1975      MRN: [id number]  Encounter Provider: Solis Avila MD  Encounter Date: 10/1/2024   Encounter department: [facility] INTERNAL MEDICINE [place]    Assessment & Plan  Renal calculus, left         Chronic pain disorder         Acute pyelonephritis              History of Present Illness   {Disappearing Hyperlinks I Encounters * My Last Note * Since Last Visit * History :68707}  Transitional Care Management Review:   Maximilian Mann is a 48 y.o. female here for TCM follow up.     During the TCM phone call patient stated:  TCM Call       Date and time call was made  2024  2:37 PM    Hospital care reviewed  Records reviewed    Patient was hospitialized at  [facility]  DC TO [facility]    Date of Admission  24    Date of discharge  24    Diagnosis  shortness of breath    Disposition  Home    Were the patients medications reviewed and updated  Yes    Current Symptoms  Middle abdominal pain    Middle abdominal pain onset  Gradual          TCM Call       Post hospital issues  Reduced activity    Should patient be enrolled in anticoag monitoring?  Yes    Scheduled for follow up?  Yes    Not clinically warranted  DC TO [facility]    Did you obtain your prescribed medications  Yes    Do you need help managing your prescriptions or medications  No    Is transportation to your appointment needed  No    I have advised the patient to call PCP with any new or worsening symptoms  RAFIQ WALKER MA    Living Arrangements  Family members    Support System  Family    The type of support provided  Emotional; Financial; Physical    Do you have social support  Yes, as much as I need    Are you recieving any outpatient services  No    Are you recieving home care services  No    Are you using any community resources  No    Current waiver services  No    Have you fallen in the last 12 months  No    Interperter language line needed   No    Counseling  Family          HPI:  Patient presents for TCM appointment.    Admitted 9/27 - 9/29 for sepsis 2/2 UTI/L-sided pyelo? Following recent urologic procedure on 9/20 where she was admitted for renal calculus and had      Review of Systems  Objective   {Disappearing Hyperlinks   Review Vitals * Enter New Vitals * Results Review * Labs * Imaging * Cardiology * Procedures * Lung Cancer Screening * Surgical eConsent :17462}  There were no vitals taken for this visit.    Physical Exam  { Medications have NOT been reviewed by provider in current encounter. Once medications have been reviewed, please refresh your progress note so that you can sign the visit }    Administrative Statements {Disappearing Hyperlinks I  Level of Service * PCM/PCSP:85843}  I have spent a total time of 35 minutes in caring for this patient on the day of the visit/encounter including Instructions for management, Patient and family education, Counseling / Coordination of care, and Documenting in the medical record.

## 2024-10-02 LAB
BACTERIA BLD CULT: NORMAL
BACTERIA BLD CULT: NORMAL

## 2024-10-03 ENCOUNTER — PATIENT MESSAGE (OUTPATIENT)
Dept: INTERNAL MEDICINE CLINIC | Facility: CLINIC | Age: 49
End: 2024-10-03

## 2024-10-03 ENCOUNTER — PROCEDURE VISIT (OUTPATIENT)
Dept: UROLOGY | Facility: CLINIC | Age: 49
End: 2024-10-03
Payer: COMMERCIAL

## 2024-10-03 VITALS
BODY MASS INDEX: 20.33 KG/M2 | WEIGHT: 122 LBS | DIASTOLIC BLOOD PRESSURE: 78 MMHG | SYSTOLIC BLOOD PRESSURE: 128 MMHG | HEIGHT: 65 IN | HEART RATE: 76 BPM | OXYGEN SATURATION: 95 %

## 2024-10-03 DIAGNOSIS — R35.0 URINE FREQUENCY: Primary | ICD-10-CM

## 2024-10-03 DIAGNOSIS — N20.0 RENAL CALCULUS, LEFT: ICD-10-CM

## 2024-10-03 LAB — POST-VOID RESIDUAL VOLUME, ML POC: 0 ML

## 2024-10-03 PROCEDURE — 51798 US URINE CAPACITY MEASURE: CPT | Performed by: UROLOGY

## 2024-10-03 RX ORDER — FLUCONAZOLE 150 MG/1
150 TABLET ORAL ONCE
COMMUNITY
Start: 2024-08-07

## 2024-10-03 RX ORDER — BUDESONIDE AND FORMOTEROL FUMARATE DIHYDRATE 160; 4.5 UG/1; UG/1
2 AEROSOL RESPIRATORY (INHALATION) EVERY 12 HOURS
COMMUNITY
Start: 2024-09-12

## 2024-10-03 NOTE — PROGRESS NOTES
Assessment/Plan:    Renal calculus, left  The majority the patient stone burden was cleared.  There was poor visualization during her case and therefore concern for residual stone and CT scan does show a small residual stone burden.  We discussed removing her stent versus pursuing this residual stone burden.  Risk and benefits of each approach were discussed.  In the end she elects for second stage ureteroscopy.  We will schedule this within the next month.  I asked her to hold her blood thinners around surgery.          Subjective:      Patient ID: Maximilian Mann is a 48 y.o. female.    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      Presented to the emergency room with severe left-sided flank pain on September 19, 2024 with CT scan showing a 1.5 cm left renal pelvis stone with obstruction of the lower pole.  Pain could not be controlled.  She was taken to the operating room on September 20, 2024 and a hard brown stone was seen to be obstructing the lower pole which was fragmented and partially extracted but poor vision because of significant inflammatory debris limited evaluation of the lower pole well.  The patient was unfortunately readmitted hospital shortly after her surgery for fever which was concerning for sepsis but all of her cultures returned negative and there was also concern for pneumonia.  CT scans during that hospitalization showed stent to be in good position and there to be a few small residual stones measuring 4 to 5 mm in the lower pole of the kidney.  She is now doing well overall.  Her stent causes mild issues with frequency but otherwise not bothersome.    She does endorse some issues with frequency and feelings of include emptying but PVR today was 0 cc.        She reports a history of ureteroscopic interventions for stones in the past.      She is  known to our practice for urinary frequency, urgency, urge incontinence.  .      Patient with a past medical history of bilateral lung transplant, DM 1, asthma, chronic pain.  DVT.     She is on Eliquis for history of DVT PE      Past Surgical History:   Procedure Laterality Date    APPENDECTOMY      AUGMENTATION MAMMAPLASTY Bilateral 2004    AUGMENTATION MAMMAPLASTY Right 2021    removed     BREAST SURGERY      Aug.     BRONCHOSCOPY      CHOLECYSTECTOMY      COLONOSCOPY      EGD  07/2019    [facility]     FL RETROGRADE PYELOGRAM  01/09/2020    FL RETROGRADE PYELOGRAM  02/04/2020    FL RETROGRADE PYELOGRAM  9/20/2024    GASTROSCOPY  03/01/2024    GASTROSTOMY TUBE, PLACE      IR BALLOON-OCCLUDED ANTEGRADE TRANSVENOUS OBLITERATION (BATO)  11/22/2022    IR PORT PLACEMENT  03/22/2022    IR PORT REMOVAL  01/10/2022    IR PORT STRIPPING  11/25/2022    IR TUNNELED CENTRAL LINE PLACEMENT  01/13/2022    LUNG SURGERY      LUNG TRANSPLANT      X2    ND CYSTO BLADDER W/URETERAL CATHETERIZATION Right 01/09/2020    Procedure: CYSTOSCOPY RETROGRADE PYELOGRAM WITH INSERTION STENT URETERAL;  Surgeon: Patricio Perry MD;  Location: [facility];  Service: Urology    ND CYSTO/URETERO W/LITHOTRIPSY &INDWELL STENT INSRT Right 02/04/2020    Procedure: CYSTOSCOPY URETEROSCOPY WITH LITHOTRIPSY HOLMIUM LASER, RETROGRADE PYELOGRAM AND INSERTION STENT URETERAL;  Surgeon: Patricio Perry MD;  Location: [facility];  Service: Urology    ND CYSTO/URETERO W/LITHOTRIPSY &INDWELL STENT INSRT Left 9/20/2024    Procedure: CYSTOSCOPY URETEROSCOPY WITH LITHOTRIPSY HOLMIUM LASER, RETROGRADE PYELOGRAM AND INSERTION STENT URETERAL, BASKET STONE EXTRACTION;  Surgeon: Lee Briceno MD;  Location: [facility];  Service: Urology    SKIN BIOPSY      US GUIDED VASCULAR ACCESS  01/17/2017        Past Medical History:   Diagnosis Date    Abnormal uterine bleeding (AUB)     ABPA (allergic bronchopulmonary aspergillosis) (HCC) 07/08/2012    Acute kidney  injury (HCC) 09/08/2017    Acute on chronic respiratory failure (HCC) 03/04/2017    Last Assessment & Plan:  Formatting of this note might be different from the original. Recurrent with clinical worsening with enlarging PTX, s/p multiple chest tubes, but new fevers 6/19 and worsening leukocytosis indicating infection as cause of 6/19 decompensation with profound hypoxia in setting of shunting through right lung and continued pneumothorax s/p 2nd chest tube placement.  6/27: Right    Anaphylaxis 06/06/2022    Anemia     Asthma     Chest pain 09/21/2021    Chronic pain     Chronic respiratory failure with hypoxia (HCC) 09/07/2021    COVID-19 06/03/2022    Cystic fibrosis ([facility])     Deep vein thrombosis (HCC)     Diabetes mellitus ([facility])     Fever 09/22/2024    GERD (gastroesophageal reflux disease)     HBP (high blood pressure)     HCAP (healthcare-associated pneumonia) 03/06/2020    History of transfusion     Influenza B 02/23/2020    Kidney stone     Moderate protein-calorie malnutrition ([facility]) 01/08/2022    Neutropenic fever  ([facility]) 09/26/2020    Pneumonia due to Pseudomonas species ([facility]) 10/28/2010    Last Assessment & Plan:  Formatting of this note might be different from the original. Polymicrobial pna w/ pseudomonas, M. Abscessus, Cadida glabrata/albicans    PONV (postoperative nausea and vomiting)     Pseudomonas aeruginosa infection 09/20/2012    Renal calculus     Squamous cell skin cancer 01/12/2023    SCCIS- Left upper thigh    Transplant recipient     Lung- 2013, 2017    Ulcerative colitis ([facility])     Wellness examination 02/04/2013    Formatting of this note might be different from the original. Discussed with patient and acknowledgement form signed on 2/4/2013 (ALYSHA)             Review of Systems   Constitutional:  Negative for chills and fever.   HENT:  Negative for ear pain and sore throat.    Eyes:  Negative for pain and visual disturbance.   Respiratory:  Negative for cough and shortness of breath.   "  Cardiovascular:  Negative for chest pain and palpitations.   Gastrointestinal:  Negative for abdominal pain and vomiting.   Genitourinary:  Negative for dysuria and hematuria.   Musculoskeletal:  Negative for arthralgias and back pain.   Skin:  Negative for color change and rash.   Neurological:  Negative for seizures and syncope.   All other systems reviewed and are negative.        Objective:      /78 (BP Location: Left arm, Patient Position: Sitting, Cuff Size: Standard)   Pulse 76   Ht 5' 5.2\" (1.656 m)   Wt 55.3 kg (122 lb)   SpO2 95%   BMI 20.18 kg/m²     No results found for: \"PSA\"       Physical Exam  Vitals reviewed.   Constitutional:       General: She is not in acute distress.     Appearance: Normal appearance. She is not ill-appearing, toxic-appearing or diaphoretic.   HENT:      Head: Normocephalic and atraumatic.   Eyes:      Extraocular Movements: Extraocular movements intact.      Pupils: Pupils are equal, round, and reactive to light.   Pulmonary:      Effort: Pulmonary effort is normal.   Abdominal:      General: Abdomen is flat. There is no distension.      Palpations: Abdomen is soft. There is no mass.      Tenderness: There is no abdominal tenderness. There is no guarding or rebound.      Hernia: No hernia is present.   Skin:     General: Skin is warm.   Neurological:      General: No focal deficit present.      Mental Status: She is alert and oriented to person, place, and time. Mental status is at baseline.   Psychiatric:         Mood and Affect: Mood normal.         Behavior: Behavior normal.         Thought Content: Thought content normal.         Pursue the patient CT scan showing a residual stone burden primarily in the lower pole of the left kidney without hydronephrosis and stent in good position.    CT CHEST, ABDOMEN AND PELVIS WITHOUT IV CONTRAST     INDICATION: cough, fever, hx lung transplant. left flank pain.     COMPARISON: CTs of the abdomen/pelvis dated 9/23/2020 " 4/9/2024. Chest CT dated 8/2/2024.     TECHNIQUE: CT examination of the chest, abdomen and pelvis was performed without intravenous contrast. Multiplanar 2D reformatted images were created from the source data.     This examination, like all CT scans performed in the [facility], was performed utilizing techniques to minimize radiation dose exposure, including the use of iterative reconstruction and automated exposure control. Radiation dose length   product (DLP) for this visit: 639 mGy-cm     Enteric Contrast: Not administered.     FINDINGS:     CHEST     LUNGS: Status post right pneumonectomy and left lung transplant.     New posterior left lower lobe centrilobular nodularity, focally, suggesting bronchiolitis (4/74)  Other sub-6 mm pulmonary nodules scattered throughout the left lung appear similar to priors. No consolidative or groundglass opacities. Postsurgical   changes of the airway and distortion of the tracheal course along with the remainder of the mediastinum. No endobronchial lesions. No interstitial lung abnormalities.     PLEURA: Unremarkable.     HEART/GREAT VESSELS: Heart and aorta sit in the lateral right hemithorax after right pneumonectomy. Right central venous catheter tip at the cavoatrial junction. Chronic calcifications of the SVC and inferior right atrial wall. Hypodensity of blood pool   relative to myocardium suggesting anemia. Right ventricular papillary muscles show multifocal areas of subendocardial fat infiltration suggesting old infarct. No pericardial effusion. Thoracic aorta is distorted in course but normal in caliber.   Enlargement of the central pulmonary arterial tree which can be seen with pulmonary hypertension.     MEDIASTINUM AND BRUCE: Severe rightward displacement due to prior surgery. Distortion of the esophagus. Proximal third of the stomach extends into the right hemithorax. Prominent prevascular nodes in the superior mediastinum appears similar to  prior. No   pathologically enlarged mediastinal or hilar lymphadenopathy or mass demonstrated.     CHEST WALL AND LOWER NECK: Left retropectoral saline breast implant with normal contour. Stable postsurgical findings of the right chest wall. No abnormality around the patient's right chest wall power injectable port.     ABDOMEN     LIVER/BILIARY TREE: Pericapsular surgical clips and calcifications within the inferior tip of the liver. Overall size and contour are within normal limits accounting for distortion after right pneumonectomy. No masses or biliary ductal dilatation.     GALLBLADDER: Post cholecystectomy.     SPLEEN: Mild splenomegaly measuring 13.2 cm in long axis with lobulation of the hilar contour. No focal masses.     PANCREAS: Fatty involution without mass or pancreatic ductal dilatation.     ADRENAL GLANDS: Unremarkable.     KIDNEYS/URETERS: Small nephroliths and infundibular stones at the left lower pole. Ureteral stent proximal pigtail at the renal pelvis. No right-sided urolithiasis. No hydronephrosis on either side. Left ureteral stent distal pigtail terminates   appropriately in the bladder.     No suspicious contour distorting renal masses or perinephric collections.     STOMACH AND BOWEL: Proximal third of the stomach appears intrathoracic. Question posterior gastric diverticulum at the cardia. No gastric wall thickening. No dilated or inflamed loops of small or large bowel. Large colonic stool burden extending to the   rectum.     APPENDIX: Surgically absent.     ABDOMINOPELVIC CAVITY: No ascites. No pneumoperitoneum. No lymphadenopathy.     VESSELS: Unremarkable for patient's age.     PELVIS     REPRODUCTIVE ORGANS: Unremarkable for patient's age.     URINARY BLADDER: 2 to 3 mm calcified stones layering dependently in the bladder. No evident ureteral stones. Wall thickness within allowable limits for level of distention. No perivesicular fat stranding.     ABDOMINAL WALL/INGUINAL REGIONS:  Unremarkable.     BONES: Healed median sternotomy. Stable postsurgical findings at the right rib cage. No acute fracture or suspicious osseous lesion.     IMPRESSION:     1.  Left-sided nephrolithiasis with upper collecting infundibular stones and layering stones in the urinary bladder. Bladder stones are new having migrated from the upper collecting system since prior. No stones projecting along the course of the   appropriately positioned left ureteral stent. No hydronephrosis.     2.  Mild focal bronchiolitis at the left lower lobe. No pneumonic consolidation.    Orders  Orders Placed This Encounter   Procedures    Urine culture     Order Specific Question:   Release to patient through Mychart     Answer:   Immediate    POCT Measure PVR

## 2024-10-03 NOTE — ASSESSMENT & PLAN NOTE
The majority the patient stone burden was cleared.  There was poor visualization during her case and therefore concern for residual stone and CT scan does show a small residual stone burden.  We discussed removing her stent versus pursuing this residual stone burden.  Risk and benefits of each approach were discussed.  In the end she elects for second stage ureteroscopy.  We will schedule this within the next month.  I asked her to hold her blood thinners around surgery.

## 2024-10-04 ENCOUNTER — HOSPITAL ENCOUNTER (OUTPATIENT)
Dept: RADIOLOGY | Facility: HOSPITAL | Age: 49
End: 2024-10-04
Payer: COMMERCIAL

## 2024-10-04 DIAGNOSIS — K21.9 GASTROESOPHAGEAL REFLUX DISEASE, UNSPECIFIED WHETHER ESOPHAGITIS PRESENT: ICD-10-CM

## 2024-10-04 PROCEDURE — 74240 X-RAY XM UPR GI TRC 1CNTRST: CPT

## 2024-10-05 LAB
BACTERIA UR CULT: NORMAL
Lab: NO GROWTH

## 2024-10-08 DIAGNOSIS — D50.9 IRON DEFICIENCY ANEMIA, UNSPECIFIED IRON DEFICIENCY ANEMIA TYPE: Primary | ICD-10-CM

## 2024-10-08 RX ORDER — SODIUM CHLORIDE 9 MG/ML
20 INJECTION, SOLUTION INTRAVENOUS ONCE
OUTPATIENT
Start: 2024-10-14

## 2024-10-10 NOTE — RESULT ENCOUNTER NOTE
Please call the patient regarding her abnormal result.  No apparent reflux was seen on this examination.  Proceed with Bravo study for esophageal pH monitoring as discussed during office visit.  Follow up in my office as needed

## 2024-10-16 ENCOUNTER — TELEPHONE (OUTPATIENT)
Dept: UROLOGY | Facility: AMBULATORY SURGERY CENTER | Age: 49
End: 2024-10-16

## 2024-10-16 NOTE — TELEPHONE ENCOUNTER
Called patient to schedule surgery with Dr. Briceno. Patient was unable to answer but I was able to leave a voicemail asking patient to please give the office a call back to schedule. Office number was provided.

## 2024-10-21 ENCOUNTER — HOSPITAL ENCOUNTER (INPATIENT)
Facility: HOSPITAL | Age: 49
LOS: 4 days | Discharge: HOME HEALTH | DRG: 368 | End: 2024-10-26
Attending: STUDENT IN AN ORGANIZED HEALTH CARE EDUCATION/TRAINING PROGRAM | Admitting: FAMILY MEDICINE
Payer: COMMERCIAL

## 2024-10-21 ENCOUNTER — APPOINTMENT (EMERGENCY)
Dept: CT IMAGING | Facility: HOSPITAL | Age: 49
DRG: 368 | End: 2024-10-21
Payer: COMMERCIAL

## 2024-10-21 ENCOUNTER — APPOINTMENT (EMERGENCY)
Dept: RADIOLOGY | Facility: HOSPITAL | Age: 49
DRG: 368 | End: 2024-10-21
Payer: COMMERCIAL

## 2024-10-21 DIAGNOSIS — Z94.2 HX OF LUNG TRANSPLANT (HCC): ICD-10-CM

## 2024-10-21 DIAGNOSIS — R78.81 BACTEREMIA DUE TO GRAM-POSITIVE BACTERIA: ICD-10-CM

## 2024-10-21 DIAGNOSIS — K20.90 ESOPHAGITIS: ICD-10-CM

## 2024-10-21 DIAGNOSIS — R41.82 ALTERED MENTAL STATUS: ICD-10-CM

## 2024-10-21 DIAGNOSIS — R11.14 BILIOUS VOMITING WITH NAUSEA: ICD-10-CM

## 2024-10-21 DIAGNOSIS — K92.2 UPPER GI BLEED: Primary | ICD-10-CM

## 2024-10-21 LAB
ALBUMIN SERPL BCG-MCNC: 3.3 G/DL (ref 3.5–5)
ALP SERPL-CCNC: 65 U/L (ref 34–104)
ALT SERPL W P-5'-P-CCNC: 19 U/L (ref 7–52)
ANION GAP SERPL CALCULATED.3IONS-SCNC: 8 MMOL/L (ref 4–13)
AST SERPL W P-5'-P-CCNC: 14 U/L (ref 13–39)
B-OH-BUTYR SERPL-MCNC: 0.27 MMOL/L (ref 0.02–0.27)
BASE EX.OXY STD BLDV CALC-SCNC: 63.7 % (ref 60–80)
BASE EXCESS BLDV CALC-SCNC: 0.2 MMOL/L
BASOPHILS # BLD AUTO: 0.07 THOUSANDS/ΜL (ref 0–0.1)
BASOPHILS NFR BLD AUTO: 0 % (ref 0–1)
BILIRUB SERPL-MCNC: 0.72 MG/DL (ref 0.2–1)
BILIRUB UR QL STRIP: NEGATIVE
BUN SERPL-MCNC: 68 MG/DL (ref 5–25)
CALCIUM ALBUM COR SERPL-MCNC: 9 MG/DL (ref 8.3–10.1)
CALCIUM SERPL-MCNC: 8.4 MG/DL (ref 8.4–10.2)
CARDIAC TROPONIN I PNL SERPL HS: 17 NG/L
CHLORIDE SERPL-SCNC: 99 MMOL/L (ref 96–108)
CLARITY UR: CLEAR
CO2 SERPL-SCNC: 27 MMOL/L (ref 21–32)
COLOR UR: ABNORMAL
CREAT SERPL-MCNC: 1.14 MG/DL (ref 0.6–1.3)
EOSINOPHIL # BLD AUTO: 0.22 THOUSAND/ΜL (ref 0–0.61)
EOSINOPHIL NFR BLD AUTO: 1 % (ref 0–6)
ERYTHROCYTE [DISTWIDTH] IN BLOOD BY AUTOMATED COUNT: 14.4 % (ref 11.6–15.1)
FLUAV RNA RESP QL NAA+PROBE: NEGATIVE
FLUBV RNA RESP QL NAA+PROBE: NEGATIVE
GFR SERPL CREATININE-BSD FRML MDRD: 57 ML/MIN/1.73SQ M
GLUCOSE SERPL-MCNC: 406 MG/DL (ref 65–140)
GLUCOSE SERPL-MCNC: 448 MG/DL (ref 65–140)
GLUCOSE UR STRIP-MCNC: ABNORMAL MG/DL
HCO3 BLDV-SCNC: 23.6 MMOL/L (ref 24–30)
HCT VFR BLD AUTO: 42.7 % (ref 34.8–46.1)
HGB BLD-MCNC: 12.9 G/DL (ref 11.5–15.4)
HGB UR QL STRIP.AUTO: NEGATIVE
IMM GRANULOCYTES # BLD AUTO: 0.09 THOUSAND/UL (ref 0–0.2)
IMM GRANULOCYTES NFR BLD AUTO: 0 % (ref 0–2)
KETONES UR STRIP-MCNC: NEGATIVE MG/DL
LACTATE SERPL-SCNC: 1.9 MMOL/L (ref 0.5–2)
LEUKOCYTE ESTERASE UR QL STRIP: NEGATIVE
LIPASE SERPL-CCNC: <6 U/L (ref 11–82)
LYMPHOCYTES # BLD AUTO: 7.58 THOUSANDS/ΜL (ref 0.6–4.47)
LYMPHOCYTES NFR BLD AUTO: 36 % (ref 14–44)
MCH RBC QN AUTO: 26.4 PG (ref 26.8–34.3)
MCHC RBC AUTO-ENTMCNC: 30.2 G/DL (ref 31.4–37.4)
MCV RBC AUTO: 87 FL (ref 82–98)
MONOCYTES # BLD AUTO: 2.27 THOUSAND/ΜL (ref 0.17–1.22)
MONOCYTES NFR BLD AUTO: 11 % (ref 4–12)
NEUTROPHILS # BLD AUTO: 10.69 THOUSANDS/ΜL (ref 1.85–7.62)
NEUTS SEG NFR BLD AUTO: 52 % (ref 43–75)
NITRITE UR QL STRIP: NEGATIVE
NRBC BLD AUTO-RTO: 0 /100 WBCS
O2 CT BLDV-SCNC: 12.2 ML/DL
PCO2 BLDV: 34.5 MM HG (ref 42–50)
PH BLDV: 7.45 [PH] (ref 7.3–7.4)
PH UR STRIP.AUTO: 6 [PH]
PLATELET # BLD AUTO: 142 THOUSANDS/UL (ref 149–390)
PMV BLD AUTO: 12.1 FL (ref 8.9–12.7)
PO2 BLDV: 34.2 MM HG (ref 35–45)
POTASSIUM SERPL-SCNC: 3.9 MMOL/L (ref 3.5–5.3)
PROCALCITONIN SERPL-MCNC: 0.37 NG/ML
PROT SERPL-MCNC: 6.4 G/DL (ref 6.4–8.4)
PROT UR STRIP-MCNC: NEGATIVE MG/DL
RBC # BLD AUTO: 4.89 MILLION/UL (ref 3.81–5.12)
RSV RNA RESP QL NAA+PROBE: NEGATIVE
SARS-COV-2 RNA RESP QL NAA+PROBE: NEGATIVE
SODIUM SERPL-SCNC: 134 MMOL/L (ref 135–147)
SP GR UR STRIP.AUTO: 1.05 (ref 1–1.03)
UROBILINOGEN UR STRIP-ACNC: <2 MG/DL
WBC # BLD AUTO: 20.92 THOUSAND/UL (ref 4.31–10.16)

## 2024-10-21 PROCEDURE — 85025 COMPLETE CBC W/AUTO DIFF WBC: CPT | Performed by: STUDENT IN AN ORGANIZED HEALTH CARE EDUCATION/TRAINING PROGRAM

## 2024-10-21 PROCEDURE — 84484 ASSAY OF TROPONIN QUANT: CPT | Performed by: STUDENT IN AN ORGANIZED HEALTH CARE EDUCATION/TRAINING PROGRAM

## 2024-10-21 PROCEDURE — 0241U HB NFCT DS VIR RESP RNA 4 TRGT: CPT | Performed by: STUDENT IN AN ORGANIZED HEALTH CARE EDUCATION/TRAINING PROGRAM

## 2024-10-21 PROCEDURE — 96361 HYDRATE IV INFUSION ADD-ON: CPT

## 2024-10-21 PROCEDURE — 71260 CT THORAX DX C+: CPT

## 2024-10-21 PROCEDURE — 83690 ASSAY OF LIPASE: CPT | Performed by: STUDENT IN AN ORGANIZED HEALTH CARE EDUCATION/TRAINING PROGRAM

## 2024-10-21 PROCEDURE — 74177 CT ABD & PELVIS W/CONTRAST: CPT

## 2024-10-21 PROCEDURE — 83605 ASSAY OF LACTIC ACID: CPT | Performed by: STUDENT IN AN ORGANIZED HEALTH CARE EDUCATION/TRAINING PROGRAM

## 2024-10-21 PROCEDURE — 70450 CT HEAD/BRAIN W/O DYE: CPT

## 2024-10-21 PROCEDURE — 36415 COLL VENOUS BLD VENIPUNCTURE: CPT | Performed by: STUDENT IN AN ORGANIZED HEALTH CARE EDUCATION/TRAINING PROGRAM

## 2024-10-21 PROCEDURE — 99285 EMERGENCY DEPT VISIT HI MDM: CPT

## 2024-10-21 PROCEDURE — 82805 BLOOD GASES W/O2 SATURATION: CPT | Performed by: STUDENT IN AN ORGANIZED HEALTH CARE EDUCATION/TRAINING PROGRAM

## 2024-10-21 PROCEDURE — 93005 ELECTROCARDIOGRAM TRACING: CPT

## 2024-10-21 PROCEDURE — 84145 PROCALCITONIN (PCT): CPT | Performed by: STUDENT IN AN ORGANIZED HEALTH CARE EDUCATION/TRAINING PROGRAM

## 2024-10-21 PROCEDURE — 82948 REAGENT STRIP/BLOOD GLUCOSE: CPT

## 2024-10-21 PROCEDURE — 99285 EMERGENCY DEPT VISIT HI MDM: CPT | Performed by: STUDENT IN AN ORGANIZED HEALTH CARE EDUCATION/TRAINING PROGRAM

## 2024-10-21 PROCEDURE — 80053 COMPREHEN METABOLIC PANEL: CPT | Performed by: STUDENT IN AN ORGANIZED HEALTH CARE EDUCATION/TRAINING PROGRAM

## 2024-10-21 PROCEDURE — 80197 ASSAY OF TACROLIMUS: CPT | Performed by: STUDENT IN AN ORGANIZED HEALTH CARE EDUCATION/TRAINING PROGRAM

## 2024-10-21 PROCEDURE — 82010 KETONE BODYS QUAN: CPT | Performed by: STUDENT IN AN ORGANIZED HEALTH CARE EDUCATION/TRAINING PROGRAM

## 2024-10-21 PROCEDURE — 96375 TX/PRO/DX INJ NEW DRUG ADDON: CPT

## 2024-10-21 PROCEDURE — 71045 X-RAY EXAM CHEST 1 VIEW: CPT

## 2024-10-21 RX ORDER — MORPHINE SULFATE 4 MG/ML
4 INJECTION, SOLUTION INTRAMUSCULAR; INTRAVENOUS ONCE
Status: COMPLETED | OUTPATIENT
Start: 2024-10-21 | End: 2024-10-21

## 2024-10-21 RX ORDER — ONDANSETRON 2 MG/ML
4 INJECTION INTRAMUSCULAR; INTRAVENOUS ONCE
Status: COMPLETED | OUTPATIENT
Start: 2024-10-21 | End: 2024-10-21

## 2024-10-21 RX ORDER — PANTOPRAZOLE SODIUM 40 MG/10ML
40 INJECTION, POWDER, LYOPHILIZED, FOR SOLUTION INTRAVENOUS ONCE
Status: COMPLETED | OUTPATIENT
Start: 2024-10-21 | End: 2024-10-21

## 2024-10-21 RX ADMIN — SODIUM CHLORIDE 1000 ML: 0.9 INJECTION, SOLUTION INTRAVENOUS at 21:10

## 2024-10-21 RX ADMIN — ONDANSETRON 4 MG: 2 INJECTION INTRAMUSCULAR; INTRAVENOUS at 21:10

## 2024-10-21 RX ADMIN — MORPHINE SULFATE 4 MG: 4 INJECTION INTRAVENOUS at 21:10

## 2024-10-21 RX ADMIN — IOHEXOL 100 ML: 350 INJECTION, SOLUTION INTRAVENOUS at 21:55

## 2024-10-21 RX ADMIN — PANTOPRAZOLE SODIUM 40 MG: 40 INJECTION, POWDER, FOR SOLUTION INTRAVENOUS at 22:37

## 2024-10-22 ENCOUNTER — APPOINTMENT (INPATIENT)
Dept: MRI IMAGING | Facility: HOSPITAL | Age: 49
DRG: 368 | End: 2024-10-22
Payer: COMMERCIAL

## 2024-10-22 PROBLEM — K92.0 HEMATEMESIS WITH NAUSEA: Status: ACTIVE | Noted: 2024-10-22

## 2024-10-22 PROBLEM — G92.8 TOXIC METABOLIC ENCEPHALOPATHY: Status: ACTIVE | Noted: 2024-10-22

## 2024-10-22 LAB
2HR DELTA HS TROPONIN: 3 NG/L
4HR DELTA HS TROPONIN: 6 NG/L
ALBUMIN SERPL BCG-MCNC: 3 G/DL (ref 3.5–5)
ALP SERPL-CCNC: 63 U/L (ref 34–104)
ALT SERPL W P-5'-P-CCNC: 15 U/L (ref 7–52)
AMPHETAMINES SERPL QL SCN: NEGATIVE
ANION GAP SERPL CALCULATED.3IONS-SCNC: 6 MMOL/L (ref 4–13)
AST SERPL W P-5'-P-CCNC: 12 U/L (ref 13–39)
BARBITURATES UR QL: NEGATIVE
BENZODIAZ UR QL: NEGATIVE
BILIRUB SERPL-MCNC: 0.47 MG/DL (ref 0.2–1)
BUN SERPL-MCNC: 57 MG/DL (ref 5–25)
CALCIUM ALBUM COR SERPL-MCNC: 8.3 MG/DL (ref 8.3–10.1)
CALCIUM SERPL-MCNC: 7.5 MG/DL (ref 8.4–10.2)
CARDIAC TROPONIN I PNL SERPL HS: 20 NG/L
CARDIAC TROPONIN I PNL SERPL HS: 23 NG/L
CHLORIDE SERPL-SCNC: 109 MMOL/L (ref 96–108)
CO2 SERPL-SCNC: 25 MMOL/L (ref 21–32)
COCAINE UR QL: NEGATIVE
CREAT SERPL-MCNC: 1.21 MG/DL (ref 0.6–1.3)
ERYTHROCYTE [DISTWIDTH] IN BLOOD BY AUTOMATED COUNT: 14.6 % (ref 11.6–15.1)
FENTANYL UR QL SCN: NEGATIVE
GFR SERPL CREATININE-BSD FRML MDRD: 53 ML/MIN/1.73SQ M
GLUCOSE SERPL-MCNC: 138 MG/DL (ref 65–140)
GLUCOSE SERPL-MCNC: 164 MG/DL (ref 65–140)
GLUCOSE SERPL-MCNC: 200 MG/DL (ref 65–140)
GLUCOSE SERPL-MCNC: 214 MG/DL (ref 65–140)
GLUCOSE SERPL-MCNC: 261 MG/DL (ref 65–140)
GLUCOSE SERPL-MCNC: 292 MG/DL (ref 65–140)
HCT VFR BLD AUTO: 39 % (ref 34.8–46.1)
HGB BLD-MCNC: 11.8 G/DL (ref 11.5–15.4)
HYDROCODONE UR QL SCN: NEGATIVE
MAGNESIUM SERPL-MCNC: 1.5 MG/DL (ref 1.9–2.7)
MCH RBC QN AUTO: 27.1 PG (ref 26.8–34.3)
MCHC RBC AUTO-ENTMCNC: 30.3 G/DL (ref 31.4–37.4)
MCV RBC AUTO: 90 FL (ref 82–98)
METHADONE UR QL: NEGATIVE
OPIATES UR QL SCN: POSITIVE
OXYCODONE+OXYMORPHONE UR QL SCN: POSITIVE
PCP UR QL: NEGATIVE
PLATELET # BLD AUTO: 121 THOUSANDS/UL (ref 149–390)
PMV BLD AUTO: 12 FL (ref 8.9–12.7)
POTASSIUM SERPL-SCNC: 4 MMOL/L (ref 3.5–5.3)
PROT SERPL-MCNC: 5.8 G/DL (ref 6.4–8.4)
RBC # BLD AUTO: 4.35 MILLION/UL (ref 3.81–5.12)
SODIUM SERPL-SCNC: 140 MMOL/L (ref 135–147)
TACROLIMUS BLD-MCNC: 12.7 NG/ML (ref 3–15)
THC UR QL: NEGATIVE
WBC # BLD AUTO: 16.95 THOUSAND/UL (ref 4.31–10.16)

## 2024-10-22 PROCEDURE — 94760 N-INVAS EAR/PLS OXIMETRY 1: CPT

## 2024-10-22 PROCEDURE — 70551 MRI BRAIN STEM W/O DYE: CPT

## 2024-10-22 PROCEDURE — RECHECK

## 2024-10-22 PROCEDURE — 99254 IP/OBS CNSLTJ NEW/EST MOD 60: CPT | Performed by: INTERNAL MEDICINE

## 2024-10-22 PROCEDURE — 94664 DEMO&/EVAL PT USE INHALER: CPT

## 2024-10-22 PROCEDURE — 87154 CUL TYP ID BLD PTHGN 6+ TRGT: CPT | Performed by: STUDENT IN AN ORGANIZED HEALTH CARE EDUCATION/TRAINING PROGRAM

## 2024-10-22 PROCEDURE — 36415 COLL VENOUS BLD VENIPUNCTURE: CPT | Performed by: STUDENT IN AN ORGANIZED HEALTH CARE EDUCATION/TRAINING PROGRAM

## 2024-10-22 PROCEDURE — 83735 ASSAY OF MAGNESIUM: CPT | Performed by: FAMILY MEDICINE

## 2024-10-22 PROCEDURE — 80307 DRUG TEST PRSMV CHEM ANLYZR: CPT | Performed by: INTERNAL MEDICINE

## 2024-10-22 PROCEDURE — 96376 TX/PRO/DX INJ SAME DRUG ADON: CPT

## 2024-10-22 PROCEDURE — 84484 ASSAY OF TROPONIN QUANT: CPT | Performed by: STUDENT IN AN ORGANIZED HEALTH CARE EDUCATION/TRAINING PROGRAM

## 2024-10-22 PROCEDURE — 80053 COMPREHEN METABOLIC PANEL: CPT | Performed by: FAMILY MEDICINE

## 2024-10-22 PROCEDURE — 87040 BLOOD CULTURE FOR BACTERIA: CPT | Performed by: STUDENT IN AN ORGANIZED HEALTH CARE EDUCATION/TRAINING PROGRAM

## 2024-10-22 PROCEDURE — 99223 1ST HOSP IP/OBS HIGH 75: CPT | Performed by: FAMILY MEDICINE

## 2024-10-22 PROCEDURE — 85025 COMPLETE CBC W/AUTO DIFF WBC: CPT | Performed by: FAMILY MEDICINE

## 2024-10-22 PROCEDURE — 82948 REAGENT STRIP/BLOOD GLUCOSE: CPT

## 2024-10-22 PROCEDURE — 96365 THER/PROPH/DIAG IV INF INIT: CPT

## 2024-10-22 PROCEDURE — 94640 AIRWAY INHALATION TREATMENT: CPT

## 2024-10-22 RX ORDER — INSULIN LISPRO 100 [IU]/ML
1-5 INJECTION, SOLUTION INTRAVENOUS; SUBCUTANEOUS EVERY 6 HOURS
Status: DISCONTINUED | OUTPATIENT
Start: 2024-10-22 | End: 2024-10-25

## 2024-10-22 RX ORDER — BUDESONIDE AND FORMOTEROL FUMARATE DIHYDRATE 160; 4.5 UG/1; UG/1
2 AEROSOL RESPIRATORY (INHALATION) EVERY 12 HOURS
Status: DISCONTINUED | OUTPATIENT
Start: 2024-10-22 | End: 2024-10-26 | Stop reason: HOSPADM

## 2024-10-22 RX ORDER — DOXYCYCLINE 100 MG/10ML
100 INJECTION, POWDER, LYOPHILIZED, FOR SOLUTION INTRAVENOUS 2 TIMES DAILY
Status: DISCONTINUED | OUTPATIENT
Start: 2024-10-22 | End: 2024-10-22

## 2024-10-22 RX ORDER — ONDANSETRON 2 MG/ML
4 INJECTION INTRAMUSCULAR; INTRAVENOUS EVERY 6 HOURS PRN
Status: DISCONTINUED | OUTPATIENT
Start: 2024-10-22 | End: 2024-10-24

## 2024-10-22 RX ORDER — VALACYCLOVIR HYDROCHLORIDE 500 MG/1
500 TABLET, FILM COATED ORAL DAILY
Status: DISCONTINUED | OUTPATIENT
Start: 2024-10-22 | End: 2024-10-23

## 2024-10-22 RX ORDER — ONDANSETRON 2 MG/ML
4 INJECTION INTRAMUSCULAR; INTRAVENOUS ONCE
Status: COMPLETED | OUTPATIENT
Start: 2024-10-22 | End: 2024-10-22

## 2024-10-22 RX ORDER — ATOVAQUONE 750 MG/5ML
1500 SUSPENSION ORAL DAILY
Status: DISCONTINUED | OUTPATIENT
Start: 2024-10-22 | End: 2024-10-26 | Stop reason: HOSPADM

## 2024-10-22 RX ORDER — METOCLOPRAMIDE HYDROCHLORIDE 5 MG/ML
10 INJECTION INTRAMUSCULAR; INTRAVENOUS EVERY 6 HOURS SCHEDULED
Status: DISCONTINUED | OUTPATIENT
Start: 2024-10-22 | End: 2024-10-24

## 2024-10-22 RX ORDER — ALBUTEROL SULFATE 0.83 MG/ML
2.5 SOLUTION RESPIRATORY (INHALATION)
Status: DISCONTINUED | OUTPATIENT
Start: 2024-10-22 | End: 2024-10-26 | Stop reason: HOSPADM

## 2024-10-22 RX ORDER — TAMSULOSIN HYDROCHLORIDE 0.4 MG/1
0.4 CAPSULE ORAL
Status: DISCONTINUED | OUTPATIENT
Start: 2024-10-22 | End: 2024-10-26 | Stop reason: HOSPADM

## 2024-10-22 RX ORDER — PREDNISONE 10 MG/1
10 TABLET ORAL DAILY
Status: DISCONTINUED | OUTPATIENT
Start: 2024-10-22 | End: 2024-10-24

## 2024-10-22 RX ORDER — INSULIN GLARGINE 100 [IU]/ML
5 INJECTION, SOLUTION SUBCUTANEOUS EVERY MORNING
Status: DISCONTINUED | OUTPATIENT
Start: 2024-10-22 | End: 2024-10-26 | Stop reason: HOSPADM

## 2024-10-22 RX ORDER — AZITHROMYCIN 500 MG/1
250 TABLET, FILM COATED ORAL 3 TIMES WEEKLY
Status: DISCONTINUED | OUTPATIENT
Start: 2024-10-22 | End: 2024-10-23

## 2024-10-22 RX ORDER — FAMOTIDINE 10 MG/ML
20 INJECTION, SOLUTION INTRAVENOUS
Status: DISCONTINUED | OUTPATIENT
Start: 2024-10-22 | End: 2024-10-26 | Stop reason: HOSPADM

## 2024-10-22 RX ORDER — INSULIN LISPRO 100 [IU]/ML
1-5 INJECTION, SOLUTION INTRAVENOUS; SUBCUTANEOUS
Status: DISCONTINUED | OUTPATIENT
Start: 2024-10-22 | End: 2024-10-22

## 2024-10-22 RX ORDER — TACROLIMUS 0.5 MG/1
2 CAPSULE ORAL 2 TIMES DAILY
Status: DISCONTINUED | OUTPATIENT
Start: 2024-10-22 | End: 2024-10-24

## 2024-10-22 RX ORDER — PANTOPRAZOLE SODIUM 40 MG/10ML
40 INJECTION, POWDER, LYOPHILIZED, FOR SOLUTION INTRAVENOUS EVERY 12 HOURS SCHEDULED
Status: DISCONTINUED | OUTPATIENT
Start: 2024-10-22 | End: 2024-10-25

## 2024-10-22 RX ORDER — OXYBUTYNIN CHLORIDE 5 MG/1
15 TABLET, EXTENDED RELEASE ORAL
Status: DISCONTINUED | OUTPATIENT
Start: 2024-10-22 | End: 2024-10-26 | Stop reason: HOSPADM

## 2024-10-22 RX ORDER — MINOCYCLINE HYDROCHLORIDE 50 MG/1
100 CAPSULE ORAL EVERY 12 HOURS SCHEDULED
Status: DISCONTINUED | OUTPATIENT
Start: 2024-10-22 | End: 2024-10-22

## 2024-10-22 RX ORDER — SODIUM CHLORIDE FOR INHALATION 3 %
4 VIAL, NEBULIZER (ML) INHALATION
Status: DISCONTINUED | OUTPATIENT
Start: 2024-10-22 | End: 2024-10-26 | Stop reason: HOSPADM

## 2024-10-22 RX ORDER — MAGNESIUM SULFATE HEPTAHYDRATE 40 MG/ML
2 INJECTION, SOLUTION INTRAVENOUS ONCE
Status: COMPLETED | OUTPATIENT
Start: 2024-10-22 | End: 2024-10-23

## 2024-10-22 RX ORDER — FUROSEMIDE 40 MG/1
40 TABLET ORAL DAILY
Status: DISCONTINUED | OUTPATIENT
Start: 2024-10-22 | End: 2024-10-23

## 2024-10-22 RX ORDER — ALBUTEROL SULFATE 0.83 MG/ML
5 SOLUTION RESPIRATORY (INHALATION)
Status: DISCONTINUED | OUTPATIENT
Start: 2024-10-22 | End: 2024-10-22

## 2024-10-22 RX ORDER — MINOCYCLINE HYDROCHLORIDE 100 MG/1
100 CAPSULE ORAL EVERY 12 HOURS SCHEDULED
Status: ON HOLD | COMMUNITY
Start: 2024-10-10 | End: 2024-10-26

## 2024-10-22 RX ORDER — SODIUM CHLORIDE, SODIUM GLUCONATE, SODIUM ACETATE, POTASSIUM CHLORIDE, MAGNESIUM CHLORIDE, SODIUM PHOSPHATE, DIBASIC, AND POTASSIUM PHOSPHATE .53; .5; .37; .037; .03; .012; .00082 G/100ML; G/100ML; G/100ML; G/100ML; G/100ML; G/100ML; G/100ML
100 INJECTION, SOLUTION INTRAVENOUS CONTINUOUS
Status: DISCONTINUED | OUTPATIENT
Start: 2024-10-22 | End: 2024-10-24

## 2024-10-22 RX ADMIN — INSULIN GLARGINE 5 UNITS: 100 INJECTION, SOLUTION SUBCUTANEOUS at 08:32

## 2024-10-22 RX ADMIN — TAMSULOSIN HYDROCHLORIDE 0.4 MG: 0.4 CAPSULE ORAL at 17:00

## 2024-10-22 RX ADMIN — INSULIN LISPRO 2 UNITS: 100 INJECTION, SOLUTION INTRAVENOUS; SUBCUTANEOUS at 08:46

## 2024-10-22 RX ADMIN — FUROSEMIDE 40 MG: 40 TABLET ORAL at 08:34

## 2024-10-22 RX ADMIN — CEFTRIAXONE SODIUM 1000 MG: 10 INJECTION, POWDER, FOR SOLUTION INTRAVENOUS at 00:29

## 2024-10-22 RX ADMIN — ATOVAQUONE 1500 MG: 750 SUSPENSION ORAL at 08:43

## 2024-10-22 RX ADMIN — SODIUM CHLORIDE, SODIUM GLUCONATE, SODIUM ACETATE, POTASSIUM CHLORIDE, MAGNESIUM CHLORIDE, SODIUM PHOSPHATE, DIBASIC, AND POTASSIUM PHOSPHATE 100 ML/HR: .53; .5; .37; .037; .03; .012; .00082 INJECTION, SOLUTION INTRAVENOUS at 03:54

## 2024-10-22 RX ADMIN — VALACYCLOVIR HYDROCHLORIDE 500 MG: 500 TABLET, FILM COATED ORAL at 08:34

## 2024-10-22 RX ADMIN — ALBUTEROL SULFATE 5 MG: 2.5 SOLUTION RESPIRATORY (INHALATION) at 07:36

## 2024-10-22 RX ADMIN — ALBUTEROL SULFATE 2.5 MG: 2.5 SOLUTION RESPIRATORY (INHALATION) at 19:49

## 2024-10-22 RX ADMIN — ONDANSETRON 4 MG: 2 INJECTION INTRAMUSCULAR; INTRAVENOUS at 09:46

## 2024-10-22 RX ADMIN — TACROLIMUS 2 MG: 0.5 CAPSULE ORAL at 18:13

## 2024-10-22 RX ADMIN — ONDANSETRON 4 MG: 2 INJECTION INTRAMUSCULAR; INTRAVENOUS at 00:28

## 2024-10-22 RX ADMIN — DOXYCYCLINE 100 MG: 100 INJECTION, POWDER, LYOPHILIZED, FOR SOLUTION INTRAVENOUS at 11:27

## 2024-10-22 RX ADMIN — PREDNISONE 10 MG: 10 TABLET ORAL at 08:34

## 2024-10-22 RX ADMIN — PANTOPRAZOLE SODIUM 40 MG: 40 INJECTION, POWDER, FOR SOLUTION INTRAVENOUS at 08:33

## 2024-10-22 RX ADMIN — MINOCYCLINE HYDROCHLORIDE 100 MG: 50 CAPSULE ORAL at 08:43

## 2024-10-22 RX ADMIN — SODIUM CHLORIDE SOLN NEBU 3% 4 ML: 3 NEBU SOLN at 07:37

## 2024-10-22 RX ADMIN — SODIUM CHLORIDE 500 ML: 0.9 INJECTION, SOLUTION INTRAVENOUS at 00:32

## 2024-10-22 RX ADMIN — SODIUM CHLORIDE SOLN NEBU 3% 4 ML: 3 NEBU SOLN at 19:49

## 2024-10-22 RX ADMIN — ONDANSETRON 4 MG: 2 INJECTION INTRAMUSCULAR; INTRAVENOUS at 04:44

## 2024-10-22 RX ADMIN — METOCLOPRAMIDE 10 MG: 5 INJECTION, SOLUTION INTRAMUSCULAR; INTRAVENOUS at 17:06

## 2024-10-22 RX ADMIN — SODIUM CHLORIDE, SODIUM GLUCONATE, SODIUM ACETATE, POTASSIUM CHLORIDE, MAGNESIUM CHLORIDE, SODIUM PHOSPHATE, DIBASIC, AND POTASSIUM PHOSPHATE 100 ML/HR: .53; .5; .37; .037; .03; .012; .00082 INJECTION, SOLUTION INTRAVENOUS at 17:05

## 2024-10-22 RX ADMIN — MAGNESIUM SULFATE HEPTAHYDRATE 2 G: 40 INJECTION, SOLUTION INTRAVENOUS at 09:31

## 2024-10-22 RX ADMIN — PANTOPRAZOLE SODIUM 40 MG: 40 INJECTION, POWDER, FOR SOLUTION INTRAVENOUS at 22:14

## 2024-10-22 RX ADMIN — TACROLIMUS 2 MG: 0.5 CAPSULE ORAL at 08:34

## 2024-10-22 RX ADMIN — FAMOTIDINE 20 MG: 10 INJECTION, SOLUTION INTRAVENOUS at 22:14

## 2024-10-22 RX ADMIN — BUDESONIDE AND FORMOTEROL FUMARATE DIHYDRATE 2 PUFF: 160; 4.5 AEROSOL RESPIRATORY (INHALATION) at 15:00

## 2024-10-22 RX ADMIN — METOCLOPRAMIDE 10 MG: 5 INJECTION, SOLUTION INTRAMUSCULAR; INTRAVENOUS at 11:34

## 2024-10-22 RX ADMIN — APIXABAN 5 MG: 5 TABLET, FILM COATED ORAL at 18:45

## 2024-10-22 NOTE — H&P
H&P - Hospitalist   Name: Maximilian RAY Sourav Mann 48 y.o. female I MRN: [id number]  Unit/Bed#: -01 I Date of Admission: 10/21/2024   Date of Service: 10/22/2024 I Hospital Day: 0     Assessment & Plan  Hematemesis with nausea  Patient with nausea and hematemesis    N.p.o.  Protonix 40 mg IV every 12 hours  GI consultation  Discontinued Eliquis and other none emergent medications at this time  Toxic metabolic encephalopathy  Unclear, will need to get more history from the family.  Patient identifies that she is confused and answers yes.  I anticipate is due to polypharmacy with her having Xanax, oxycodone, Lyrica and also Marinol.    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  Has had recurrent admissions for pneumonia and Pseudomonas/MSSA and status post pneumonectomy    Continue immunosuppression medications including Prograf, atovaquone, prednisone  On prophylactic Zithromax and acyclovir    GERD (gastroesophageal reflux disease)  History of severe GERD status post fundoplication in 2023  Protonix for milligrams IV every 12 hours  Type 1 diabetes mellitus with diabetic chronic kidney disease, unspecified CKD stage ([facility])  I am not clear what her home regimen is at this time.  That will need to be clarified  From the records it looks like she takes glargine 12 units in the morning, and NovoLog 10 each with breakfast, 8 units with lunch and 12 with dinner.    Will do glargine 5 units in the morning, insulin sliding scale every 6 hours as she is NPO.  Pancreatic insufficiency  Continue Creon when tolerating diet  Chronic pain disorder  Is on Lyrica and oxycodone which we will  hold for encephalopathy and confusion  History of DVT (deep vein thrombosis)  History of left lower extremity DVT on Eliquis.  Eliquis discontinued due to hematemesis      Disposition  #1  IV fluids and n.p.o.  #2  Consult GI  #3  Held off on Eliquis  #4  Will need to clarify insulin regimen  #5  Labs in the morning  #6 if encephalopathy is not clearing, may need MRI        VTE Pharmacologic Prophylaxis: VTE Score: 4 Moderate Risk (Score 3-4) - Pharmacological DVT Prophylaxis Contraindicated. Sequential Compression Devices Ordered.  Code Status: Level 1 - Full Code       Anticipated Length of Stay: Patient will be admitted on an inpatient basis with an anticipated length of stay of greater than 2 midnights secondary to hematemesis.    History of Present Illness   Chief Complaint: Nausea vomiting    Maximilian Mann is a 48 y.o. 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  Patient has recurrent admissions for pneumonia and Pseudomonas/MSSA, also status post pneumonectomy following up with YAYA Mcdonnell.  She also has a severe history of GERD status post fundoplication in 2023, anxiety, type 1 diabetes on insulin, pulmonary hypertension, CKD stage III, nephrolithiasis in 2011 status post lithotripsy, pancreatic insufficiency on Creon, left lower extremity DVT on Eliquis who presents with vomiting times several days.  Per ER records patient has been in and out of reality x 3 days.  There is nobody at bedside.  Did not get a hold of family at this time..    Review of Systems   HENT: Negative.     Respiratory: Negative.     Cardiovascular: Negative.    Gastrointestinal:  Positive for nausea and vomiting.   Neurological:  Positive for weakness.   Psychiatric/Behavioral:          Confusion    ROS difficult to obtain but complaints of nausea  and vomiting and confusion    Historical Information   Past Medical History:   Diagnosis Date    Abnormal uterine bleeding (AUB)     ABPA (allergic bronchopulmonary aspergillosis) ([facility]) 07/08/2012    Acute kidney injury (HCC) 09/08/2017    Acute on chronic respiratory failure (HCC) 03/04/2017    Last Assessment & Plan:  Formatting of this note might be different from the original. Recurrent with clinical worsening with enlarging PTX, s/p multiple chest tubes, but new fevers 6/19 and worsening leukocytosis indicating infection as cause of 6/19 decompensation with profound hypoxia in setting of shunting through right lung and continued pneumothorax s/p 2nd chest tube placement.  6/27: Right    Anaphylaxis 06/06/2022    Anemia     Asthma     Chest pain 09/21/2021    Chronic pain     Chronic respiratory failure with hypoxia ([facility]) 09/07/2021    COVID-19 06/03/2022    Cystic fibrosis ([facility])     Deep vein thrombosis ([facility])     Diabetes mellitus ([facility])     Fever 09/22/2024    GERD (gastroesophageal reflux disease)     HBP (high blood pressure)     HCAP (healthcare-associated pneumonia) 03/06/2020    History of transfusion     Influenza B 02/23/2020    Kidney stone     Moderate protein-calorie malnutrition (HCC) 01/08/2022    Neutropenic fever  ([facility]) 09/26/2020    Pneumonia due to Pseudomonas species ([facility]) 10/28/2010    Last Assessment & Plan:  Formatting of this note might be different from the original. Polymicrobial pna w/ pseudomonas, M. Abscessus, Cadida glabrata/albicans    PONV (postoperative nausea and vomiting)     Pseudomonas aeruginosa infection 09/20/2012    Renal calculus     Squamous cell skin cancer 01/12/2023    SCCIS- Left upper thigh    Transplant recipient     Lung- 2013, 2017    Ulcerative colitis ([facility])     Wellness examination 02/04/2013    Formatting of this note might be different from the original. Discussed with patient and acknowledgement form signed on 2/4/2013 (ALYSHA)     Past Surgical History:    Procedure Laterality Date    APPENDECTOMY      AUGMENTATION MAMMAPLASTY Bilateral 2004    AUGMENTATION MAMMAPLASTY Right     removed     BREAST SURGERY      Aug.     BRONCHOSCOPY      CHOLECYSTECTOMY      COLONOSCOPY      EGD  2019    [facility]     FL RETROGRADE PYELOGRAM  2020    FL RETROGRADE PYELOGRAM  2020    FL RETROGRADE PYELOGRAM  2024    GASTROSCOPY  2024    GASTROSTOMY TUBE, PLACE      IR BALLOON-OCCLUDED ANTEGRADE TRANSVENOUS OBLITERATION (BATO)  2022    IR PORT PLACEMENT  2022    IR PORT REMOVAL  01/10/2022    IR PORT STRIPPING  2022    IR TUNNELED CENTRAL LINE PLACEMENT  2022    LUNG SURGERY      LUNG TRANSPLANT      X2    NE CYSTO BLADDER W/URETERAL CATHETERIZATION Right 2020    Procedure: CYSTOSCOPY RETROGRADE PYELOGRAM WITH INSERTION STENT URETERAL;  Surgeon: Patricio Perry MD;  Location: [facility];  Service: Urology    NE CYSTO/URETERO W/LITHOTRIPSY &INDWELL STENT INSRT Right 2020    Procedure: CYSTOSCOPY URETEROSCOPY WITH LITHOTRIPSY HOLMIUM LASER, RETROGRADE PYELOGRAM AND INSERTION STENT URETERAL;  Surgeon: Patricio Perry MD;  Location: [facility];  Service: Urology    NE CYSTO/URETERO W/LITHOTRIPSY &INDWELL STENT INSRT Left 2024    Procedure: CYSTOSCOPY URETEROSCOPY WITH LITHOTRIPSY HOLMIUM LASER, RETROGRADE PYELOGRAM AND INSERTION STENT URETERAL, BASKET STONE EXTRACTION;  Surgeon: Lee Briceno MD;  Location: [facility];  Service: Urology    SKIN BIOPSY      US GUIDED VASCULAR ACCESS  2017     Social History     Tobacco Use    Smoking status: Former     Current packs/day: 0.00     Average packs/day: 0.5 packs/day for 12.0 years (6.0 ttl pk-yrs)     Types: Cigarettes     Start date: 1993     Quit date: 2005     Years since quittin.8     Passive exposure: Past    Smokeless tobacco: Never   Vaping Use    Vaping status: Never Used   Substance and Sexual Activity    Alcohol use: Yes      Alcohol/week: 2.0 standard drinks of alcohol     Types: 2 Glasses of wine per week     Comment: social    Drug use: Yes     Frequency: 2.0 times per week     Types: Marijuana     Comment: medical edible marijuana prescribed    Sexual activity: Yes     Partners: Male     Birth control/protection: None     E-Cigarette/Vaping    E-Cigarette Use Never User      E-Cigarette/Vaping Substances    Nicotine No     THC No     CBD No     Flavoring No     Other No     Unknown No      Family history non-contributory  Social History:  Marital Status: /Civil Union       Meds/Allergies   I have reviewed home medications using recent Epic encounter.  Prior to Admission medications    Medication Sig Start Date End Date Taking? Authorizing Provider   minocycline (MINOCIN) 100 mg capsule Take 100 mg by mouth every 12 (twelve) hours 10/10/24  Yes Historical Provider, MD   acetaminophen (TYLENOL) 325 mg tablet Take 3 tablets (975 mg total) by mouth every 8 (eight) hours 9/26/21   WAYNE Paredes   albuterol (2.5 mg/3 mL) 0.083 % nebulizer solution Take 6 mL (5 mg total) by nebulization 2 (two) times a day 9/26/21   WAYNE Paredes   albuterol (PROVENTIL HFA,VENTOLIN HFA) 90 mcg/act inhaler INHALE 2 PUFFS EVERY 8 HOURS AS NEEDED FOR SHORTNESS OF BREATH OR WHEEZING. 11/29/23   Historical Provider, MD   ALPRAZolam (XANAX) 1 mg tablet Take 1 tablet (1 mg total) by mouth daily at bedtime as needed for anxiety or sleep 9/13/24   Kathya Hanson MD   apixaban (ELIQUIS) 5 mg Take 1 tablet (5 mg total) by mouth 2 (two) times a day 6/5/23 10/10/24  Solis Avila MD   Ascorbic Acid (vitamin C) 100 MG tablet Take 100 mg by mouth daily    Historical Provider, MD   atovaquone (MEPRON) 750 mg/5 mL suspension Take 1,500 mg by mouth daily    Historical Provider, MD   azithromycin (ZITHROMAX) 250 mg tablet Take 250 mg by mouth 3 (three) times a week 1/6/23   Historical Provider, MD   BD Pen Needle Abi U/F 32G X 4 MM MISC 4 times daily  6/19/24   Eve Polk MD   bisacodyl (DULCOLAX) 5 mg EC tablet Take 1 tablet (5 mg total) by mouth daily at bedtime 5/7/23   Armand Juarez MD   budesonide-formoterol (SYMBICORT) 160-4.5 mcg/act inhaler Inhale 2 puffs every 12 (twelve) hours 9/12/24   Historical Provider, MD   Cholecalciferol (Vitamin D3) 125 MCG (5000 UT) TABS Take 5,000 Units by mouth daily    Historical Provider, MD   Continuous Blood Gluc  (Dexcom G7 ) SUKHI Use 1 each continuous 7/31/23   Eve Polk MD   Continuous Glucose Sensor (Dexcom G7 Sensor) Use 1 Device every 10 days 5/20/24   Eve Polk MD   dexlansoprazole (DEXILANT) 60 MG capsule TAKE 1 CAPSULE BY MOUTH EVERY DAY IN THE MORNING 7/19/24   WAYNE Nunez   dronabinol (MARINOL) 5 MG capsule Take 1 capsule (5 mg total) by mouth 3 (three) times a day 9/11/23   Gwendolyn Nuñez MD   famotidine (PEPCID) 40 MG tablet TAKE 1 TABLET BY MOUTH DAILY AT BEDTIME 9/30/24   Gwendolyn Nuñez MD   fluconazole (DIFLUCAN) 150 mg tablet Take 150 mg by mouth once 8/7/24   Historical Provider, MD   furosemide (LASIX) 20 mg tablet Take 40 mg by mouth daily    Historical Provider, MD   insulin aspart (NovoLOG FlexPen) 100 UNIT/ML injection pen INJECT 10 UNITS pre-BREAKFAST, 8 UNITS pre-LUNCH and 12 UNITS pre-DINNER plus scale; max 50 UNITS/day 9/19/24   Eve Polk MD   Insulin Disposable Pump (Omnipod 5 G6 Intro, Gen 5,) KIT Use 1 Units continuous 3/6/24   Eve Polk MD   Insulin Disposable Pump (Omnipod 5 G6 Pods, Gen 5,) MISC Use 1 Units every other day 3/6/24   Eve Polk MD   Insulin Glargine-yfgn (Semglee, yfgn,) 100 UNIT/ML SOPN Inject 0.12 mL (12 Units total) under the skin every morning 8/26/24   Eve Polk MD   Insulin Pen Needle (Comfort EZ Pen Needles) 33G X 4 MM MISC Use to inject insulin 4 times a day 11/3/22   Eve Polk MD   multivitamin (THERAGRAN) TABS Take 1 tablet by mouth daily     Historical Provider, MD   naloxone (NARCAN) 4 mg/0.1 mL nasal spray 0.1 mL (4 mg total) into each nostril every 3 (three) minutes as needed for opioid reversal or respiratory depression 6/13/24   Solis Avila MD   ondansetron (ZOFRAN) 4 mg tablet Take 4 mg by mouth every 8 (eight) hours as needed for nausea 3/21/24   Historical Provider, MD   oxybutynin (DITROPAN XL) 15 MG 24 hr tablet Take 1 tablet (15 mg total) by mouth daily at bedtime 9/3/24   Gianna Rob PA-C   oxyCODONE (ROXICODONE) 10 MG TABS Take 1 tablet (10 mg total) by mouth see administration instructions Take 1 to 2 tablets every 8 hours as needed for severe pain. Max dose 6 tabs a day Do not start before October 3, 2024. 10/3/24 11/2/24  Solis Avila MD   pancrelipase, Lip-Prot-Amyl, (CREON) 24,000 units Take 48,000 Units by mouth 3 (three) times a day with meals    Historical Provider, MD   polyethylene glycol (MIRALAX) 17 g packet Take 17 g by mouth 2 (two) times a day  Patient taking differently: Take 17 g by mouth 2 (two) times a day as needed 5/7/23   Armand Juarez MD   predniSONE 10 mg tablet Take 10 mg by mouth daily Taking 15 mg now 4/27/21   Historical Provider, MD   pregabalin (LYRICA) 150 mg capsule Take 1 capsule (150 mg total) by mouth 2 times a day 7/18/24   Gwendolyn Nuñez MD   promethazine-dextromethorphan (PHENERGAN-DM) 6.25-15 mg/5 mL oral syrup Take 5 mL by mouth 4 (four) times a day as needed for cough  Patient not taking: Reported on 10/3/2024 7/30/24   Solis Avila MD   Respiratory Therapy Supplies (Nebulizer) SUKHI Please dispense nebulizer machine 7/16/21   Historical Provider, MD   tacrolimus (PROGRAF) 1 mg capsule Take 2 capsules (2 mg total) by mouth 2 (two) times a day 9/26/24   Andrea Dos Santos PA-C   tamsulosin (FLOMAX) 0.4 mg Take 1 capsule (0.4 mg total) by mouth daily with dinner 9/26/24   Andrea Dos Santos PA-C   Turmeric 500 MG CAPS Take by mouth    Historical Provider, MD   valACYclovir (VALTREX) 500 mg tablet Take  500 mg by mouth daily 6/1/21   Historical Provider, MD   vitamin B-12 (VITAMIN B-12) 1,000 mcg tablet Take by mouth daily    Historical Provider, MD   cyclobenzaprine (FLEXERIL) 5 mg tablet Take 1-2 tabs every 8 hours as needed for muscle pain/spasm  Patient not taking: Reported on 10/13/2022 8/1/22 11/8/22  Solis Avila MD   traZODone (DESYREL) 50 mg tablet Take 0.5 tablets (25 mg total) by mouth daily at bedtime 9/26/21 11/8/22  WAYNE Paredes     Allergies   Allergen Reactions    Bebtelovimab Anaphylaxis    Vancomycin Angioedema and Hives    Gabapentin Hallucinations    Ceftazidime Headache     Severe headaches after desensitization    Nsaids Other (See Comments)     Lung transplant increases risk of renal toxicity, call lung txp provider to discuss if needed       Objective :  Temp:  [98 °F (36.7 °C)-101.7 °F (38.7 °C)] 101.7 °F (38.7 °C)  HR:  [] 100  BP: (115-126)/(64-80) 115/80  Resp:  [16-22] 18  SpO2:  [92 %-99 %] 96 %  O2 Device: None (Room air)    Physical Exam  Constitutional:       Comments: Chronically ill-appearing but confused   HENT:      Head: Normocephalic and atraumatic.      Mouth/Throat:      Mouth: Mucous membranes are dry.   Eyes:      Extraocular Movements: Extraocular movements intact.   Cardiovascular:      Rate and Rhythm: Normal rate and regular rhythm.      Pulses: Normal pulses.      Heart sounds: Normal heart sounds.   Pulmonary:      Comments: Decreased breath sounds  Abdominal:      General: There is no distension.      Palpations: Abdomen is soft.      Tenderness: There is no abdominal tenderness.   Musculoskeletal:         General: No swelling.   Skin:     General: Skin is warm and dry.   Neurological:      General: No focal deficit present.      Mental Status: She is alert.      Comments: Confused able to move all extremities   Psychiatric:      Comments: Confused          Lines/Drains:  Lines/Drains/Airways       Active Status       Name Placement date Placement time  Site Days    Port A Cath 11/25/22 Right Chest 11/25/22  1201  Chest  696                               Lab Results: I have reviewed the following results:  Results from last 7 days   Lab Units 10/21/24  2102   WBC Thousand/uL 20.92*   HEMOGLOBIN g/dL 12.9   HEMATOCRIT % 42.7   PLATELETS Thousands/uL 142*   SEGS PCT % 52   LYMPHO PCT % 36   MONO PCT % 11   EOS PCT % 1     Results from last 7 days   Lab Units 10/21/24  2102   SODIUM mmol/L 134*   POTASSIUM mmol/L 3.9   CHLORIDE mmol/L 99   CO2 mmol/L 27   BUN mg/dL 68*   CREATININE mg/dL 1.14   ANION GAP mmol/L 8   CALCIUM mg/dL 8.4   ALBUMIN g/dL 3.3*   TOTAL BILIRUBIN mg/dL 0.72   ALK PHOS U/L 65   ALT U/L 19   AST U/L 14   GLUCOSE RANDOM mg/dL 448*         Results from last 7 days   Lab Units 10/22/24  0051 10/21/24  2048   POC GLUCOSE mg/dl 292* 406*     Lab Results   Component Value Date    HGBA1C 7.5 (H) 09/21/2024    HGBA1C 7.6 (H) 05/27/2024    HGBA1C 8.0 (A) 01/15/2024     Results from last 7 days   Lab Units 10/21/24  2102   LACTIC ACID mmol/L 1.9   PROCALCITONIN ng/ml 0.37*       Imaging Results Review: I reviewed radiology reports from this admission including: CT chest and CT abdomen/pelvis.      Administrative Statements     Medical decision making: High  Diagnosis addressed: 1 illness posing threat to life with hematemesis in a patient with complicated extensive history  Data:   Reviewed  CBC, CMP, troponin, A1c, Pro-Seth  Ordered CBC, BMP,  Reviewed external notes from PCP, urology,  Discussion of management with ER provider unclear etiology of encephalopathy, hematemesis given IV fluids          Risk:  Prescription drug management: IV Protonix, insulin  Discussion for hospitalization with ER provider: Patient with hyperglycemia, hematemesis requiring admission        ** Please Note: This note has been constructed using a voice recognition system. **

## 2024-10-22 NOTE — RESPIRATORY THERAPY NOTE
RT Protocol Note  Maximilian RAY Sourav Mann 48 y.o. female MRN: [id number]  Unit/Bed#: -01 Encounter: [id number]    Assessment    Principal Problem:    Nausea and vomiting  Active Problems:    Type 1 diabetes mellitus with diabetic chronic kidney disease, unspecified CKD stage (HCC)    Pancreatic insufficiency    Chronic pain disorder    GERD (gastroesophageal reflux disease)    Gastroparesis    Hx of lung transplant ([facility])    History of DVT (deep vein thrombosis)    Hematemesis with nausea    Toxic metabolic encephalopathy      Home Pulmonary Medications:     10/22/24 1949   Respiratory Protocol   Protocol Initiated? No   Protocol Selection Respiratory   Language Barrier? No   Medical & Social History Reviewed? Yes   Diagnostic Studies Reviewed? Yes   Physical Assessment Performed? Yes   Respiratory Plan No distress/Pulmonary history   Respiratory Assessment   Assessment Type During-treatment   General Appearance Drowsy   Respiratory Pattern Normal   Chest Assessment Chest expansion symmetrical   Bilateral Breath Sounds Diminished   Cough None   Resp Comments Will continue with current tx plan   O2 Device RA   Additional Assessments   Pulse 89   Respirations 20   SpO2 96 %            Past Medical History:   Diagnosis Date    Abnormal uterine bleeding (AUB)     ABPA (allergic bronchopulmonary aspergillosis) ([facility]) 07/08/2012    Acute kidney injury ([facility]) 09/08/2017    Acute on chronic respiratory failure ([facility]) 03/04/2017    Last Assessment & Plan:  Formatting of this note might be different from the original. Recurrent with clinical worsening with enlarging PTX, s/p multiple chest tubes, but new fevers 6/19 and worsening leukocytosis indicating infection as cause of 6/19 decompensation with profound hypoxia in setting of shunting through right lung and continued pneumothorax s/p 2nd chest tube placement.  6/27: Right    Anaphylaxis 06/06/2022    Anemia     Asthma     Chest pain 09/21/2021    Chronic pain     Chronic  respiratory failure with hypoxia (HCC) 2021    COVID-19 2022    Cystic fibrosis (HCC)     Deep vein thrombosis (HCC)     Diabetes mellitus (HCC)     Fever 2024    GERD (gastroesophageal reflux disease)     HBP (high blood pressure)     HCAP (healthcare-associated pneumonia) 2020    History of transfusion     Influenza B 2020    Kidney stone     Moderate protein-calorie malnutrition (HCC) 2022    Neutropenic fever  (HCC) 2020    Pneumonia due to Pseudomonas species ([facility]) 10/28/2010    Last Assessment & Plan:  Formatting of this note might be different from the original. Polymicrobial pna w/ pseudomonas, M. Abscessus, Cadida glabrata/albicans    PONV (postoperative nausea and vomiting)     Pseudomonas aeruginosa infection 2012    Renal calculus     Squamous cell skin cancer 2023    SCCIS- Left upper thigh    Transplant recipient     Lung- 2017    Ulcerative colitis ([facility])     Wellness examination 2013    Formatting of this note might be different from the original. Discussed with patient and acknowledgement form signed on 2013 (ALYSHA)     Social History     Socioeconomic History    Marital status: /Civil Union     Spouse name: None    Number of children: None    Years of education: 12    Highest education level: None   Occupational History    Occupation: Disabled    Tobacco Use    Smoking status: Former     Current packs/day: 0.00     Average packs/day: 0.5 packs/day for 12.0 years (6.0 ttl pk-yrs)     Types: Cigarettes     Start date: 1993     Quit date: 2005     Years since quittin.8     Passive exposure: Past    Smokeless tobacco: Never   Vaping Use    Vaping status: Never Used   Substance and Sexual Activity    Alcohol use: Yes     Alcohol/week: 2.0 standard drinks of alcohol     Types: 2 Glasses of wine per week     Comment: social    Drug use: Yes     Frequency: 2.0 times per week     Types: Marijuana     Comment: medical edible  marijuana prescribed    Sexual activity: Yes     Partners: Male     Birth control/protection: None   Other Topics Concern    None   Social History Narrative    Most recent tobacco use screenin2019    Do you currently or have you served in the  Armed Forces: No    Were you activated, into active duty, as a member of the [employer] or as a Reservist: No    Occupation: disabled    Education: 12    Marital status:     Exercise level: Occasional    Diet: Specific    low sugar, high fat    General stress level: High    Alcohol intake: Occasional    Caffeine intake: Heavy    Chewing tobacco: none    Illicit drugs: none    Guns present in home: No    Seat belts used routinely: Yes    Smoke alarm in home: Yes    Advance directive: Yes     Social Determinants of Health     Financial Resource Strain: Low Risk  (11/10/2022)    Received from [facility] [employer] [facility]    Overall Financial Resource Strain (CARDIA)     Difficulty of Paying Living Expenses: Not hard at all   Food Insecurity: No Food Insecurity (2024)    Nursing - Inadequate Food Risk Classification     Worried About Running Out of Food in the Last Year: Never true     Ran Out of Food in the Last Year: Never true     Ran Out of Food in the Last Year: Not on file   Transportation Needs: No Transportation Needs (2024)    PRAPARE - Transportation     Lack of Transportation (Medical): No     Lack of Transportation (Non-Medical): No   Physical Activity: Insufficiently Active (1/15/2024)    Exercise Vital Sign     Days of Exercise per Week: 7 days     Minutes of Exercise per Session: 20 min   Stress: No Stress Concern Present (1/15/2024)    [employer] [place] [employer] - Occupational Stress Questionnaire     Feeling of Stress : Only a little   Social Connections: Unknown (2024)    Received from [employer]    Social [employer]     How often do you feel lonely or  "isolated from those around you? (Adult - for ages 18 years and over): Not on file   Intimate Partner Violence: Not At Risk (1/15/2024)    Humiliation, Afraid, Rape, and Kick questionnaire     Fear of Current or Ex-Partner: No     Emotionally Abused: No     Physically Abused: No     Sexually Abused: No   Housing Stability: Low Risk  (9/28/2024)    Housing Stability Vital Sign     Unable to Pay for Housing in the Last Year: No     Number of Times Moved in the Last Year: 1     Homeless in the Last Year: No       Subjective         Objective    Physical Exam:   Assessment Type: During-treatment  General Appearance: Drowsy  Respiratory Pattern: Normal  Chest Assessment: Chest expansion symmetrical  Bilateral Breath Sounds: Diminished  Cough: None  O2 Device: RA    Vitals:  Blood pressure 113/74, pulse 89, temperature 100 °F (37.8 °C), resp. rate 20, height 5' 2\" (1.575 m), weight 51.3 kg (113 lb 1.5 oz), SpO2 96%, not currently breastfeeding.          Imaging and other studies: Results Review Statement: No pertinent imaging studies reviewed.    O2 Device: RA     Plan    Respiratory Plan: No distress/Pulmonary history        Resp Comments: Will continue with current tx plan   "

## 2024-10-22 NOTE — PROGRESS NOTES
Progress Note - Hospitalist   Name: Maximilian Mann 48 y.o. female I MRN: [id number]  Unit/Bed#: -01 I Date of Admission: 10/21/2024   Date of Service: 10/22/2024 I Hospital Day: 0    Assessment & Plan  Nausea and vomiting  Patient with nausea and vomiting and questionable hematemesis.  CT scan showing diffuse esophageal thickening and mucosal Hepper encasement suggesting esophagitis.  GI team consulted and recommending medical management of nausea and vomiting.  Suspected gastroparesis in the setting of polypharmacy and altered mental status.  Recommended scheduled Reglan and outpatient EGD.  Will continue with PPI every 12 hours and Pepcid  Toxic metabolic encephalopathy  Unclear, will need to get more history from the family.  Patient identifies that she is confused and answers yes.  I anticipate is due to polypharmacy with her having Xanax, oxycodone, Lyrica and also Marinol. Those medications been discontinued  Questionable if supratherapeutic tacrolimus level pending tacrolimus blood work.  CT head showing Small extra-axial hyperdensity at the anterior aspect of the left middle cranial fossa. Possible adjacent osseous remodeling. Favor small meningioma with hemorrhage felt less likely.   IV fluids  Pending MRI brain.  Pending transfer to [facility], lung transplant department.    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  Has had recurrent admissions for pneumonia and Pseudomonas/MSSA and status post pneumonectomy    Given concern that patient  has nausea and vomiting questionable if the patient was able to keep down the medication the past few days..  Patient was recently started minocycline for MRSA airway infection.  Per patient's  the symptoms started after patient started  minocycline.  Switched to IV doxycycline  Discussed case with transplant team at [facility] and the team agreeable to transfer the patient to their hospital.    GERD (gastroesophageal reflux disease)  History of severe GERD status post fundoplication in 2023  Protonix for milligrams IV every 12 hours  Ordered famotidine IV  Type 1 diabetes mellitus with diabetic chronic kidney disease, unspecified CKD stage (HCC)  I am not clear what her home regimen is at this time.  That will need to be clarified  From the records it looks like she takes glargine 12 units in the morning, and NovoLog 10 each with breakfast, 8 units with lunch and 12 with dinner.    Will do glargine 5 units in the morning, insulin sliding scale every 6 hours as she is NPO.  Pancreatic insufficiency  Continue Creon when tolerating diet  Chronic pain disorder  Is on Lyrica and oxycodone which we will hold for encephalopathy and confusion  History of DVT (deep vein thrombosis)  History of left lower extremity DVT on Eliquis.  Resume Eliquis given no concern for bleeding.  Gastroparesis  Started Reglan    VTE Pharmacologic Prophylaxis: VTE Score: 4 Moderate Risk (Score 3-4) - Pharmacological DVT Prophylaxis Ordered: apixaban (Eliquis).    Mobility:   Basic Mobility Inpatient Raw Score: 20  JH-HLM Goal: 6: Walk 10 steps or more  JH-HLM Achieved: 6: Walk 10 steps or more  JH-HLM Goal achieved. Continue to encourage appropriate mobility.    Patient Centered Rounds: I performed bedside rounds with nursing staff today.   Discussions with Specialists or Other Care Team Provider: Discussed case with GI team and lung transplant specialist at [facility]    Education and Discussions with Family / Patient: Updated  () via phone.    Current Length of Stay: 0 day(s)  Current Patient Status: Inpatient   Certification Statement: The patient will continue to require additional inpatient hospital stay due to acute encephalopathy and nausea and  vomiting  Discharge Plan: Anticipate discharge tomorrow to acute care facility    Code Status: Level 1 - Full Code    Subjective   Patient encephalopathic oriented x 1 to place.    Objective :  Temp:  [98 °F (36.7 °C)-101.7 °F (38.7 °C)] 100 °F (37.8 °C)  HR:  [] 95  BP: (113-126)/(64-80) 113/74  Resp:  [16-22] 18  SpO2:  [92 %-100 %] 94 %  O2 Device: None (Room air)    Body mass index is 20.69 kg/m².     Input and Output Summary (last 24 hours):     Intake/Output Summary (Last 24 hours) at 10/22/2024 1739  Last data filed at 10/22/2024 1200  Gross per 24 hour   Intake 50 ml   Output --   Net 50 ml       Physical Exam  Vitals and nursing note reviewed.   Constitutional:       General: She is not in acute distress.     Appearance: She is well-developed. She is ill-appearing. She is not toxic-appearing.   HENT:      Head: Normocephalic and atraumatic.   Eyes:      Conjunctiva/sclera: Conjunctivae normal.   Cardiovascular:      Rate and Rhythm: Normal rate and regular rhythm.      Heart sounds: No murmur heard.  Pulmonary:      Effort: Pulmonary effort is normal. No respiratory distress.      Breath sounds: Normal breath sounds.   Abdominal:      Palpations: Abdomen is soft.      Tenderness: There is no abdominal tenderness.   Musculoskeletal:         General: No swelling.      Cervical back: Neck supple.      Right lower leg: No edema.      Left lower leg: No edema.   Skin:     General: Skin is warm and dry.      Capillary Refill: Capillary refill takes less than 2 seconds.   Neurological:      Mental Status: She is disoriented.      Comments: Oriented to place only   Psychiatric:         Mood and Affect: Mood normal.           Lines/Drains:  Lines/Drains/Airways       Active Status       Name Placement date Placement time Site Days    Port A Cath 11/25/22 Right Chest 11/25/22  1201  Chest  697                    Central Line:  Goal for removal: N/A - Chronic PICC                 Lab Results: I have reviewed the  following results:   Results from last 7 days   Lab Units 10/22/24  0423 10/21/24  2102   WBC Thousand/uL 16.95* 20.92*   HEMOGLOBIN g/dL 11.8 12.9   HEMATOCRIT % 39.0 42.7   PLATELETS Thousands/uL 121* 142*   SEGS PCT %  --  52   LYMPHO PCT %  --  36   MONO PCT %  --  11   EOS PCT %  --  1     Results from last 7 days   Lab Units 10/22/24  0423   SODIUM mmol/L 140   POTASSIUM mmol/L 4.0   CHLORIDE mmol/L 109*   CO2 mmol/L 25   BUN mg/dL 57*   CREATININE mg/dL 1.21   ANION GAP mmol/L 6   CALCIUM mg/dL 7.5*   ALBUMIN g/dL 3.0*   TOTAL BILIRUBIN mg/dL 0.47   ALK PHOS U/L 63   ALT U/L 15   AST U/L 12*   GLUCOSE RANDOM mg/dL 261*         Results from last 7 days   Lab Units 10/22/24  1535 10/22/24  1046 10/22/24  0749 10/22/24  0051 10/21/24  2048   POC GLUCOSE mg/dl 138 164* 214* 292* 406*         Results from last 7 days   Lab Units 10/21/24  2102   LACTIC ACID mmol/L 1.9   PROCALCITONIN ng/ml 0.37*       Recent Cultures (last 7 days):   Results from last 7 days   Lab Units 10/22/24  0106 10/22/24  0104   BLOOD CULTURE  Received in Microbiology Lab. Culture in Progress. Received in Microbiology Lab. Culture in Progress.       Imaging Results Review: I reviewed radiology reports from this admission including: CT chest, CT abdomen/pelvis, and CT head.  Other Study Results Review: EKG was reviewed.     Last 24 Hours Medication List:     Current Facility-Administered Medications:     albuterol inhalation solution 2.5 mg, BID    atovaquone (MEPRON) oral suspension 1,500 mg, Daily    azithromycin (ZITHROMAX) tablet 250 mg, Once per day on Monday Wednesday Friday    budesonide-formoterol (SYMBICORT) 160-4.5 mcg/act inhaler 2 puff, Q12H    doxycycline (VIBRAMYCIN) 100 mg in sodium chloride 0.9 % 100 mL IVPB, Q12H, Last Rate: 100 mg (10/22/24 1127)    furosemide (LASIX) tablet 40 mg, Daily    insulin glargine (LANTUS) subcutaneous injection 5 Units 0.05 mL, QAM    insulin lispro (HumALOG/ADMELOG) 100 units/mL subcutaneous  injection 1-5 Units, Q6H **AND** Fingerstick Glucose (POCT), Q6H    metoclopramide (REGLAN) injection 10 mg, Q6H DOTTY    multi-electrolyte (PLASMALYTE-A/ISOLYTE-S PH 7.4) IV solution, Continuous, Last Rate: 100 mL/hr (10/22/24 1705)    ondansetron (ZOFRAN) injection 4 mg, Q6H PRN    oxybutynin (DITROPAN-XL) 24 hr tablet 15 mg, HS    pantoprazole (PROTONIX) injection 40 mg, Q12H DOTTY    predniSONE tablet 10 mg, Daily    sodium chloride 3 % inhalation solution 4 mL, BID    tacrolimus (PROGRAF) capsule 2 mg, BID    tamsulosin (FLOMAX) capsule 0.4 mg, Daily With Dinner    valACYclovir (VALTREX) tablet 500 mg, Daily        **Please Note: This note may have been constructed using a voice recognition system.**

## 2024-10-22 NOTE — RESPIRATORY THERAPY NOTE
"RT Protocol Note  Maximilian RAY Sourav Mann 48 y.o. female MRN: [id number]  Unit/Bed#: -01 Encounter: [id number]    Assessment    Principal Problem:    Hematemesis with nausea  Active Problems:    Type 1 diabetes mellitus with diabetic chronic kidney disease, unspecified CKD stage (HCC)    Pancreatic insufficiency    Chronic pain disorder    GERD (gastroesophageal reflux disease)    Hx of lung transplant (HCC)    History of DVT (deep vein thrombosis)    Toxic metabolic encephalopathy    Physical Exam:   Assessment Type: Post-treatment  General Appearance: Drowsy  Respiratory Pattern: Normal  Chest Assessment: Chest expansion symmetrical  Bilateral Breath Sounds: Diminished  O2 Device: ra    Vitals:  Blood pressure 113/79, pulse 98, temperature 98.2 °F (36.8 °C), temperature source Oral, resp. rate 18, height 5' 2\" (1.575 m), weight 51.3 kg (113 lb 1.5 oz), SpO2 97%, not currently breastfeeding.      O2 Device: ra     Plan    Respiratory Plan: No distress/Pulmonary history        Resp Comments: pt admitted for GI bleed, hx of bronchiectasis. will cont current regimen as per  home regimen which was recently updated by OP pulm office no distress noted. pt c/o nausea. no o2 requirements at this time   "

## 2024-10-22 NOTE — ASSESSMENT & PLAN NOTE
History of severe GERD status post fundoplication in 2023  Protonix for milligrams IV every 12 hours  Ordered famotidine IV

## 2024-10-22 NOTE — ASSESSMENT & PLAN NOTE
-S/P TIF in 2022  -Maintains on Dexilant 60mg daily and Pepcid qHS in the outpatient setting  -Scheduled for EGD with BRAVO placement with Dr. Ko tomorrow - will need to be rescheduled  -Plan will be made pending results of pH testing

## 2024-10-22 NOTE — ASSESSMENT & PLAN NOTE
Unclear, will need to get more history from the family.  Patient identifies that she is confused and answers yes.  I anticipate is due to polypharmacy with her having Xanax, oxycodone, Lyrica and also Marinol.    Those medications been discontinued  IV fluids  If mental status does not improve by the morning, will obtain MRI of the brain

## 2024-10-22 NOTE — ASSESSMENT & PLAN NOTE
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  Has had recurrent admissions for pneumonia and Pseudomonas/MSSA and status post pneumonectomy    Given concern that patient  has nausea and vomiting questionable if the patient was able to keep down the medication the past few days..  Patient was recently started minocycline for MRSA airway infection.  Per patient's  the symptoms started after patient started minocycline.  Switched to IV doxycycline  Discussed case with transplant team at [facility] and the team agreeable to transfer the patient to their hospital.

## 2024-10-22 NOTE — ASSESSMENT & PLAN NOTE
I am not clear what her home regimen is at this time.  That will need to be clarified  From the records it looks like she takes glargine 12 units in the morning, and NovoLog 10 each with breakfast, 8 units with lunch and 12 with dinner.    Will do glargine 5 units in the morning, insulin sliding scale every 6 hours as she is NPO.

## 2024-10-22 NOTE — ASSESSMENT & PLAN NOTE
Patient with nausea and hematemesis    N.p.o.  Protonix 40 mg IV every 12 hours  GI consultation  Discontinued Eliquis and other none emergent medications at this time

## 2024-10-22 NOTE — PLAN OF CARE
Problem: Prexisting or High Potential for Compromised Skin Integrity  Goal: Skin integrity is maintained or improved  Description: INTERVENTIONS:  - Identify patients at risk for skin breakdown  - Assess and monitor skin integrity  - Assess and monitor nutrition and hydration status  - Monitor labs   - Assess for incontinence   - Turn and reposition patient  - Assist with mobility/ambulation  - Relieve pressure over bony prominences  - Avoid friction and shearing  - Provide appropriate hygiene as needed including keeping skin clean and dry  - Evaluate need for skin moisturizer/barrier cream  - Collaborate with interdisciplinary team   - Patient/family teaching  - Consider wound care consult   10/22/2024 1024 by Fara Kelley RN  Outcome: Progressing  10/22/2024 1022 by Fara Kelley RN  Outcome: Progressing     Problem: METABOLIC, FLUID AND ELECTROLYTES - ADULT  Goal: Electrolytes maintained within normal limits  Description: INTERVENTIONS:  - Monitor labs and assess patient for signs and symptoms of electrolyte imbalances  - Administer electrolyte replacement as ordered  - Monitor response to electrolyte replacements, including repeat lab results as appropriate  - Instruct patient on fluid and nutrition as appropriate  Outcome: Progressing

## 2024-10-22 NOTE — ASSESSMENT & PLAN NOTE
Patient with nausea and vomiting and questionable hematemesis.  CT scan showing diffuse esophageal thickening and mucosal Hepper encasement suggesting esophagitis.  GI team consulted and recommending medical management of nausea and vomiting.  Suspected gastroparesis in the setting of polypharmacy and altered mental status.  Recommended scheduled Reglan and outpatient EGD.  Will continue with PPI every 12 hours and Pepcid

## 2024-10-22 NOTE — ED PROVIDER NOTES
Time reflects when diagnosis was documented in both MDM as applicable and the Disposition within this note       Time User Action Codes Description Comment    10/22/2024 12:53 AM Angeles [place]in[place] SWAN Add [K92.2] Upper GI bleed     10/22/2024 12:53 AM Angeles [place] SWAN Add [K20.90] Esophagitis     10/22/2024 12:53 AM Angeles [place]in[place] SWAN Add [R41.82] Altered mental status     10/22/2024  6:56 PM Schivu, Anna Add [R11.14] Bilious vomiting with nausea     10/22/2024  6:56 PM Schivu, Anna Add [Z94.2] Hx of lung transplant (HCC)     10/23/2024  7:26 AM Schivu, Anna Add [R78.81] Bacteremia due to Gram-positive bacteria           ED Disposition       ED Disposition   Admit    Condition   Stable    Date/Time   Tue Oct 22, 2024 12:53 AM    Comment   Case was discussed with hospitalist and the patient's admission status was agreed to be Admission Status: inpatient status to the service of Dr. Juarez .               Assessment & Plan       Medical Decision Making  Amount and/or Complexity of Data Reviewed  Labs: ordered.  Radiology: ordered.    Risk  Prescription drug management.  Decision regarding hospitalization.         EKG: rate 106, sinus tachycardia with nonspecific st waves. Compared to prior 9/24    Different UTI, stroke, dehydration, electrolyte abnormality, GI bleed.    Patient is a 48-year-old female present emerged department no acute respiratory distress and vital signs unremarkable.  Imaging and lab work obtained for further evaluation and monitoring.  Sepsis labs obtained secondary to her being immunocompromise.  Head CT was read by V rad showing no acute intracranial process.  CT chest abdomen pelvis shows signs of colitis.  Noted right pneumonectomy.  Patient given antibiotics and Protonix due to coffee ground emesis.  No noted esophageal or gastric thickening on CT scan read by V rad.  Elevated glucose without secondary DKA.  Patient noted to have some persistent vomiting.  Emesis appeared coffee-ground in  "appearance.  Discussed admission for further monitoring given the decreased oral intake.  Patient and family informed of admission for further management monitoring and in agreement.    Medications   ondansetron (ZOFRAN) injection 4 mg (4 mg Intravenous Given 10/21/24 2110)   morphine injection 4 mg (4 mg Intravenous Given 10/21/24 2110)   sodium chloride 0.9 % bolus 1,000 mL (0 mL Intravenous Stopped 10/21/24 2210)   iohexol (OMNIPAQUE) 350 MG/ML injection (MULTI-DOSE) 100 mL (100 mL Intravenous Given 10/21/24 2155)   pantoprazole (PROTONIX) injection 40 mg (40 mg Intravenous Given 10/21/24 2237)   ceftriaxone (ROCEPHIN) 1 g/50 mL in dextrose IVPB (0 mg Intravenous Stopped 10/22/24 0059)   ondansetron (ZOFRAN) injection 4 mg (4 mg Intravenous Given 10/22/24 0028)   sodium chloride 0.9 % bolus 500 mL (500 mL Intravenous New Bag 10/22/24 0032)       ED Risk Strat Scores                                               History of Present Illness       Chief Complaint   Patient presents with    Altered Mental Status     Per , patient has been \"in and out of reality\" x 3 days. Patient not able to tell name and  during triage. Patient has been vomiting x several days       Past Medical History:   Diagnosis Date    Abnormal uterine bleeding (AUB)     ABPA (allergic bronchopulmonary aspergillosis) (HCC) 2012    Acute kidney injury (HCC) 2017    Acute on chronic respiratory failure ([facility]) 2017    Last Assessment & Plan:  Formatting of this note might be different from the original. Recurrent with clinical worsening with enlarging PTX, s/p multiple chest tubes, but new fevers  and worsening leukocytosis indicating infection as cause of  decompensation with profound hypoxia in setting of shunting through right lung and continued pneumothorax s/p 2nd chest tube placement.  : Right    Anaphylaxis 2022    Anemia     Asthma     Chest pain 2021    Chronic pain     Chronic respiratory " failure with hypoxia (HCC) 09/07/2021    COVID-19 06/03/2022    Cystic fibrosis (HCC)     Deep vein thrombosis (HCC)     Diabetes mellitus (HCC)     Fever 09/22/2024    GERD (gastroesophageal reflux disease)     HBP (high blood pressure)     HCAP (healthcare-associated pneumonia) 03/06/2020    History of transfusion     Influenza B 02/23/2020    Kidney stone     Moderate protein-calorie malnutrition (HCC) 01/08/2022    Neutropenic fever  (HCC) 09/26/2020    Pneumonia due to Pseudomonas species (HCC) 10/28/2010    Last Assessment & Plan:  Formatting of this note might be different from the original. Polymicrobial pna w/ pseudomonas, M. Abscessus, Cadida glabrata/albicans    PONV (postoperative nausea and vomiting)     Pseudomonas aeruginosa infection 09/20/2012    Renal calculus     Squamous cell skin cancer 01/12/2023    SCCIS- Left upper thigh    Transplant recipient     Lung- 2013, 2017    Ulcerative colitis (HCC)     Wellness examination 02/04/2013    Formatting of this note might be different from the original. Discussed with patient and acknowledgement form signed on 2/4/2013 (ALYSHA)      Past Surgical History:   Procedure Laterality Date    APPENDECTOMY      AUGMENTATION MAMMAPLASTY Bilateral 2004    AUGMENTATION MAMMAPLASTY Right 2021    removed     BREAST SURGERY      Aug.     BRONCHOSCOPY      CHOLECYSTECTOMY      COLONOSCOPY      EGD  07/2019    Yeast Infect.     FL RETROGRADE PYELOGRAM  01/09/2020    FL RETROGRADE PYELOGRAM  02/04/2020    FL RETROGRADE PYELOGRAM  9/20/2024    GASTROSCOPY  03/01/2024    GASTROSTOMY TUBE, PLACE      IR BALLOON-OCCLUDED ANTEGRADE TRANSVENOUS OBLITERATION (BATO)  11/22/2022    IR PORT PLACEMENT  03/22/2022    IR PORT REMOVAL  01/10/2022    IR PORT STRIPPING  11/25/2022    IR TUNNELED CENTRAL LINE PLACEMENT  01/13/2022    LUNG SURGERY      LUNG TRANSPLANT      X2    DC CYSTO BLADDER W/URETERAL CATHETERIZATION Right 01/09/2020    Procedure: CYSTOSCOPY RETROGRADE PYELOGRAM WITH  INSERTION STENT URETERAL;  Surgeon: Patricio Perry MD;  Location: [facility];  Service: Urology    MN CYSTO/URETERO W/LITHOTRIPSY &INDWELL STENT INSRT Right 2020    Procedure: CYSTOSCOPY URETEROSCOPY WITH LITHOTRIPSY HOLMIUM LASER, RETROGRADE PYELOGRAM AND INSERTION STENT URETERAL;  Surgeon: Patricio Perry MD;  Location: [facility];  Service: Urology    MN CYSTO/URETERO W/LITHOTRIPSY &INDWELL STENT INSRT Left 2024    Procedure: CYSTOSCOPY URETEROSCOPY WITH LITHOTRIPSY HOLMIUM LASER, RETROGRADE PYELOGRAM AND INSERTION STENT URETERAL, BASKET STONE EXTRACTION;  Surgeon: Lee Briceno MD;  Location: [facility];  Service: Urology    SKIN BIOPSY      US GUIDED VASCULAR ACCESS  2017      Family History   Problem Relation Age of Onset    Alcohol abuse Mother     Mental illness Mother     Depression Mother     Alcohol abuse Father     COPD Maternal Grandmother     Lung cancer Maternal Grandmother 65    Cancer Maternal Grandmother     COPD Maternal Grandfather     Cancer Maternal Grandfather     COPD Paternal Grandmother     Lung cancer Paternal Grandmother 65    No Known Problems Sister     No Known Problems Paternal Aunt     Breast cancer Neg Hx     Ovarian cancer Neg Hx     Colon cancer Neg Hx       Social History     Tobacco Use    Smoking status: Former     Current packs/day: 0.00     Average packs/day: 0.5 packs/day for 12.0 years (6.0 ttl pk-yrs)     Types: Cigarettes     Start date: 1993     Quit date: 2005     Years since quittin.8     Passive exposure: Past    Smokeless tobacco: Never   Vaping Use    Vaping status: Never Used   Substance Use Topics    Alcohol use: Yes     Alcohol/week: 2.0 standard drinks of alcohol     Types: 2 Glasses of wine per week     Comment: social    Drug use: Yes     Frequency: 2.0 times per week     Types: Marijuana     Comment: medical edible marijuana prescribed      E-Cigarette/Vaping    E-Cigarette Use Never User       E-Cigarette/Vaping  Substances    Nicotine No     THC No     CBD No     Flavoring No     Other No     Unknown No       I have reviewed and agree with the history as documented.     HPI    Patient is a 48-year-old female present emerged department for multiple concerns.  Patient has some trouble with confusion and not answering some questions appropriately over the last few days.  Patient has been vomiting.  Some of the emesis is black in color.  Per family she has been having abdominal discomfort and not been able to eat or drink much.  Patient has a history of lung transplant x 2.  Patient is on immune suppressing medications and recently started on minocycline.  History includes cystic fibrosis, diabetes, GERD, ulcerative colitis.    Review of Systems   Constitutional:  Negative for chills and fever.   HENT:  Negative for ear pain and sore throat.    Eyes:  Negative for pain and visual disturbance.   Respiratory:  Negative for cough and shortness of breath.    Cardiovascular:  Negative for chest pain and palpitations.   Gastrointestinal:  Positive for abdominal pain, nausea and vomiting.   Genitourinary:  Negative for dysuria and hematuria.   Musculoskeletal:  Negative for arthralgias and back pain.   Skin:  Negative for color change and rash.   Neurological:  Positive for weakness. Negative for seizures and syncope.   All other systems reviewed and are negative.          Objective       ED Triage Vitals   Temperature Pulse Blood Pressure Respirations SpO2 Patient Position - Orthostatic VS   10/21/24 2035 10/21/24 2035 10/21/24 2035 10/21/24 2035 10/21/24 2035 10/21/24 2035   98 °F (36.7 °C) (!) 109 116/69 18 99 % Sitting      Temp Source Heart Rate Source BP Location FiO2 (%) Pain Score    10/21/24 2035 10/21/24 2035 10/21/24 2035 -- 10/21/24 2110    Temporal Monitor Left arm  5      Vitals      Date and Time Temp Pulse SpO2 Resp BP Pain Score FACES Pain Rating User   10/23/24 2153 99.4 °F (37.4 °C) 78 95 % -- 107/51 -- -- DII    10/23/24 2012 98.3 °F (36.8 °C) 80 -- 20 113/58 -- -- CL   10/23/24 1935 -- 80 97 % 20 -- -- -- MS   10/23/24 1607 98.3 °F (36.8 °C) 79 96 % -- 113/58 -- -- DII   10/23/24 0747 -- 92 99 % 19 -- -- -- SH   10/23/24 0730 -- -- 100 % -- -- No Pain -- RG   10/23/24 0727 -- -- 98 % -- -- -- -- SH   10/23/24 0722 97.9 °F (36.6 °C) 90 97 % -- 113/59 -- -- DII   10/23/24 0007 -- -- -- -- -- No Pain -- NB   10/22/24 2211 98.3 °F (36.8 °C) 90 96 % -- 116/73 -- -- DII   10/22/24 1949 -- 89 96 % 20 -- -- -- MS   10/22/24 1930 -- -- -- -- -- No Pain -- CO   10/22/24 1537 100 °F (37.8 °C) 95 94 % -- 113/74 -- -- DII   10/22/24 0900 -- -- 100 % -- -- -- -- AS   10/22/24 0830 -- -- 97 % -- -- -- -- AS   10/22/24 0737 98.2 °F (36.8 °C) 98 97 % 18 113/79 No Pain -- AB   10/22/24 0700 -- -- 97 % -- -- -- -- AS   10/22/24 0500 -- -- 97 % -- -- -- -- AS   10/22/24 0349 -- -- -- -- -- No Pain -- [place]   10/22/24 0203 101.7 °F (38.7 °C) 100 96 % 18 115/80 -- -- DII   10/22/24 0100 -- -- 97 % -- -- -- -- MR   10/22/24 0000 -- 96 96 % 16 118/64 -- --    10/21/24 2200 -- 96 92 % 22 126/78 -- --    10/21/24 2110 -- -- -- -- -- 5 --    10/21/24 2100 -- 106 95 % 22 122/80 -- --    10/21/24 2035 98 °F (36.7 °C) 109 99 % 18 116/69 -- -- RO            Physical Exam  Vitals and nursing note reviewed.   Constitutional:       General: She is not in acute distress.     Appearance: She is well-developed.   HENT:      Head: Normocephalic and atraumatic.   Eyes:      Extraocular Movements: Extraocular movements intact.      Right eye: Normal extraocular motion.      Left eye: Normal extraocular motion.      Conjunctiva/sclera: Conjunctivae normal.      Pupils: Pupils are equal, round, and reactive to light.   Cardiovascular:      Rate and Rhythm: Normal rate and regular rhythm.      Heart sounds: No murmur heard.  Pulmonary:      Effort: Pulmonary effort is normal. No respiratory distress.      Breath sounds: Normal breath sounds.      Comments:  No left-sided lung sounds.  Abdominal:      Palpations: Abdomen is soft.      Tenderness: There is abdominal tenderness. There is guarding.   Musculoskeletal:         General: No swelling.      Cervical back: Neck supple.   Skin:     General: Skin is warm and dry.      Capillary Refill: Capillary refill takes less than 2 seconds.   Neurological:      Mental Status: She is alert.      Comments: Oriented x2   Psychiatric:         Mood and Affect: Mood normal.         Results Reviewed       Procedure Component Value Units Date/Time    Blood culture #2 [[id number]]  (Abnormal) Collected: 10/22/24 0104    Lab Status: Preliminary result Specimen: Blood from Central Venous Line Updated: 10/23/24 1326     Gram Stain Result Gram positive cocci in clusters    Blood Culture Identification Panel [[id number]]  (Abnormal) Collected: 10/22/24 0104    Lab Status: Preliminary result Specimen: Blood from Central Venous Line Updated: 10/23/24 0950     Staphylococcus epidermidis Detected     mecA/C (Methicillin-resistance gene) Detected    Narrative:      Routine culture and susceptiblity to follow for confirmation.    Film Array panel tests for 11 gram positive organisms, 15 gram negative organisms, 7 yeast species and 10 resistance genes.     Blood culture #1 [[id number]] Collected: 10/22/24 0106    Lab Status: Preliminary result Specimen: Blood from Arm, Left Updated: 10/23/24 0801     Blood Culture No Growth at 24 hrs.    Tacrolimus level [[id number]]  (Normal) Collected: 10/21/24 2102    Lab Status: Final result Specimen: Blood from Central Venous Line Updated: 10/22/24 2155     TACROLIMUS 12.7 ng/mL     HS Troponin I 4hr [[id number]]  (Normal) Collected: 10/22/24 0104    Lab Status: Final result Specimen: Blood from Central Venous Line Updated: 10/22/24 0137     hs TnI 4hr 23 ng/L      Delta 4hr hsTnI 6 ng/L     Fingerstick Glucose (POCT) [[id number]]  (Abnormal) Collected: 10/22/24 0051    Lab Status: Final result Specimen: Blood  Updated: 10/22/24 0053     POC Glucose 292 mg/dl     HS Troponin I 2hr [[id number]]  (Normal) Collected: 10/21/24 2357    Lab Status: Final result Specimen: Blood from Central Venous Line Updated: 10/22/24 0023     hs TnI 2hr 20 ng/L      Delta 2hr hsTnI 3 ng/L     UA w Reflex to Microscopic w Reflex to Culture [[id number]]  (Abnormal) Collected: 10/21/24 2352    Lab Status: Final result Specimen: Urine, Clean Catch Updated: 10/21/24 2357     Color, UA Light Yellow     Clarity, UA Clear     Specific Gravity, UA 1.046     pH, UA 6.0     Leukocytes, UA Negative     Nitrite, UA Negative     Protein, UA Negative mg/dl      Glucose, UA 1000 (1%) mg/dl      Ketones, UA Negative mg/dl      Urobilinogen, UA <2.0 mg/dl      Bilirubin, UA Negative     Occult Blood, UA Negative    COVID/FLU/RSV [[id number]]  (Normal) Collected: 10/21/24 2107    Lab Status: Final result Specimen: Nares from Nose Updated: 10/21/24 2202     SARS-CoV-2 Negative     INFLUENZA A PCR Negative     INFLUENZA B PCR Negative     RSV PCR Negative    Narrative:      This test has been performed using the CoV-2/Flu/RSV plus assay on the [employer] GeneXpert platform. This test has been validated by the  and verified by the performing laboratory.     This test is designed to amplify and detect the following: nucleocapsid (N), envelope (E), and RNA-dependent RNA polymerase (RdRP) genes of the SARS-CoV-2 genome; matrix (M), basic polymerase (PB2), and acidic protein (PA) segments of the influenza A genome; matrix (M) and non-structural protein (NS) segments of the influenza B genome, and the nucleocapsid genes of RSV A and RSV B.     Positive results are indicative of the presence of Flu A, Flu B, RSV, and/or SARS-CoV-2 RNA. Positive results for SARS-CoV-2 or suspected novel influenza should be reported to state, local, or federal health departments according to local reporting requirements.      All results should be assessed in conjunction with  clinical presentation and other laboratory markers for clinical management.     FOR PEDIATRIC PATIENTS - copy/paste COVID Guidelines URL to browser: [email or web address]       Procalcitonin [[id number]]  (Abnormal) Collected: 10/21/24 2102    Lab Status: Final result Specimen: Blood from Arm, Left Updated: 10/21/24 2138     Procalcitonin 0.37 ng/ml     Comprehensive metabolic panel [[id number]]  (Abnormal) Collected: 10/21/24 2102    Lab Status: Final result Specimen: Blood from Central Venous Line Updated: 10/21/24 2130     Sodium 134 mmol/L      Potassium 3.9 mmol/L      Chloride 99 mmol/L      CO2 27 mmol/L      ANION GAP 8 mmol/L      BUN 68 mg/dL      Creatinine 1.14 mg/dL      Glucose 448 mg/dL      Calcium 8.4 mg/dL      Corrected Calcium 9.0 mg/dL      AST 14 U/L      ALT 19 U/L      Alkaline Phosphatase 65 U/L      Total Protein 6.4 g/dL      Albumin 3.3 g/dL      Total Bilirubin 0.72 mg/dL      eGFR 57 ml/min/1.73sq m     Narrative:      National Kidney Disease Foundation guidelines for Chronic Kidney Disease (CKD):     Stage 1 with normal or high GFR (GFR > 90 mL/min/1.73 square meters)    Stage 2 Mild CKD (GFR = 60-89 mL/min/1.73 square meters)    Stage 3A Moderate CKD (GFR = 45-59 mL/min/1.73 square meters)    Stage 3B Moderate CKD (GFR = 30-44 mL/min/1.73 square meters)    Stage 4 Severe CKD (GFR = 15-29 mL/min/1.73 square meters)    Stage 5 End Stage CKD (GFR <15 mL/min/1.73 square meters)  Note: GFR calculation is accurate only with a steady state creatinine    HS Troponin 0hr (reflex protocol) [[id number]]  (Normal) Collected: 10/21/24 2102    Lab Status: Final result Specimen: Blood from Central Venous Line Updated: 10/21/24 2130     hs TnI 0hr 17 ng/L     Lipase [[id number]]  (Abnormal) Collected: 10/21/24 2102    Lab Status: Final result Specimen: Blood from Central Venous Line Updated: 10/21/24 2124     Lipase <6 u/L     Beta Hydroxybutyrate  [[id number]]  (Normal) Collected: 10/21/24 2102    Lab Status: Final result Specimen: Blood from Arm, Left Updated: 10/21/24 2124     Beta- Hydroxybutyrate 0.27 mmol/L     Lactic acid, plasma (w/reflex if result > 2.0) [[id number]]  (Normal) Collected: 10/21/24 2102    Lab Status: Final result Specimen: Blood from Central Venous Line Updated: 10/21/24 2122     LACTIC ACID 1.9 mmol/L     Narrative:      Result may be elevated if tourniquet was used during collection.    Blood gas, venous [[id number]]  (Abnormal) Collected: 10/21/24 2102    Lab Status: Final result Specimen: Blood from Line, Venous Updated: 10/21/24 2109     pH, Melvin 7.453     pCO2, Melvin 34.5 mm Hg      pO2, Melvin 34.2 mm Hg      HCO3, Melvin 23.6 mmol/L      Base Excess, Melvin 0.2 mmol/L      O2 Content, Melvin 12.2 ml/dL      O2 HGB, VENOUS 63.7 %     CBC and differential [[id number]]  (Abnormal) Collected: 10/21/24 2102    Lab Status: Final result Specimen: Blood from Central Venous Line Updated: 10/21/24 2109     WBC 20.92 Thousand/uL      RBC 4.89 Million/uL      Hemoglobin 12.9 g/dL      Hematocrit 42.7 %      MCV 87 fL      MCH 26.4 pg      MCHC 30.2 g/dL      RDW 14.4 %      MPV 12.1 fL      Platelets 142 Thousands/uL      nRBC 0 /100 WBCs      Segmented % 52 %      Immature Grans % 0 %      Lymphocytes % 36 %      Monocytes % 11 %      Eosinophils Relative 1 %      Basophils Relative 0 %      Absolute Neutrophils 10.69 Thousands/µL      Absolute Immature Grans 0.09 Thousand/uL      Absolute Lymphocytes 7.58 Thousands/µL      Absolute Monocytes 2.27 Thousand/µL      Eosinophils Absolute 0.22 Thousand/µL      Basophils Absolute 0.07 Thousands/µL     Fingerstick Glucose (POCT) [[id number]]  (Abnormal) Collected: 10/21/24 2048    Lab Status: Final result Specimen: Blood Updated: 10/21/24 2049     POC Glucose 406 mg/dl             MRI brain wo contrast   Final Interpretation by Houston Pryor MD (10/23 0123)      No evidence of acute infarct, acute  intracranial hemorrhage or mass.      Workstation performed: [id number]         CT head without contrast   Final Interpretation by Nishant Saucedo MD (10/22 0805)      Small extra-axial hyperdensity at the anterior aspect of the left middle cranial fossa. Possible adjacent osseous remodeling. Favor small meningioma with hemorrhage felt less likely. Recommend short interval follow-up CT versus characterization with    brain MRI.            This represents a change from the preliminary report.   I personally discussed this study with Anna Coats on 10/22/2024 8:04 AM.            Workstation performed: [id number]         CT chest abdomen pelvis w contrast   Final Interpretation by Nishant Saucedo MD (10/22 0718)      1.  Diffuse esophageal wall thickening and mucosal hyperenhancement suggests esophagitis.   2.  Rectosigmoid colonic wall prominence without inflammation may be due to underdistention but may be correlated for evidence of colitis. Constipation.   3.  Ancillary findings as above.            Findings in agreement with preliminary report.      Workstation performed: [id number]         XR chest 1 view portable   Final Interpretation by Howie Kemp MD (10/22 0819)   Stable postoperative change after right pneumonectomy with complete opacification right hemithorax and rightward mediastinal shift. Stable appearance to the transplanted left lung.         Workstation performed: [id number]             Procedures    ED Medication and Procedure Management   Prior to Admission Medications   Prescriptions Last Dose Informant Patient Reported? Taking?   ALPRAZolam (XANAX) 1 mg tablet  Self No No   Sig: Take 1 tablet (1 mg total) by mouth daily at bedtime as needed for anxiety or sleep   Ascorbic Acid (vitamin C) 100 MG tablet Past Week Self Yes Yes   Sig: Take 100 mg by mouth daily   BD Pen Needle Abi U/F 32G X 4 MM MISC Past Week Self No Yes   Si times daily   Cholecalciferol (Vitamin D3) 125 MCG (5000 UT) TABS  Past Week Self Yes Yes   Sig: Take 5,000 Units by mouth daily   Continuous Blood Gluc  (Dexcom G7 ) SUKHI Past Week Self No Yes   Sig: Use 1 each continuous   Continuous Glucose Sensor (Dexcom G7 Sensor) Past Week Self No Yes   Sig: Use 1 Device every 10 days   Insulin Disposable Pump (Omnipod 5 G6 Intro, Gen 5,) KIT Not Taking Self No No   Sig: Use 1 Units continuous   Patient not taking: Reported on 10/24/2024   Insulin Disposable Pump (Omnipod 5 G6 Pods, Gen 5,) MISC Not Taking Self No No   Sig: Use 1 Units every other day   Patient not taking: Reported on 10/24/2024   Insulin Glargine-yfgn (Semglee, yfgn,) 100 UNIT/ML SOPN  Self No No   Sig: Inject 0.12 mL (12 Units total) under the skin every morning   Insulin Pen Needle (Comfort EZ Pen Needles) 33G X 4 MM MISC  Self No No   Sig: Use to inject insulin 4 times a day   Respiratory Therapy Supplies (Nebulizer) SUKHI  Self Yes No   Sig: Please dispense nebulizer machine   Turmeric 500 MG CAPS Past Week Self Yes Yes   Sig: Take by mouth   acetaminophen (TYLENOL) 325 mg tablet Not Taking Self No No   Sig: Take 3 tablets (975 mg total) by mouth every 8 (eight) hours   Patient not taking: Reported on 10/24/2024   albuterol (2.5 mg/3 mL) 0.083 % nebulizer solution  Self No No   Sig: Take 6 mL (5 mg total) by nebulization 2 (two) times a day   albuterol (PROVENTIL HFA,VENTOLIN HFA) 90 mcg/act inhaler  Self Yes No   Sig: INHALE 2 PUFFS EVERY 8 HOURS AS NEEDED FOR SHORTNESS OF BREATH OR WHEEZING.   apixaban (ELIQUIS) 5 mg  Self No No   Sig: Take 1 tablet (5 mg total) by mouth 2 (two) times a day   atovaquone (MEPRON) 750 mg/5 mL suspension Not Taking Self Yes No   Sig: Take 1,500 mg by mouth daily   Patient not taking: Reported on 10/24/2024   azithromycin (ZITHROMAX) 250 mg tablet Not Taking Self Yes No   Sig: Take 250 mg by mouth 3 (three) times a week   Patient not taking: Reported on 10/24/2024   bisacodyl (DULCOLAX) 5 mg EC tablet  Self No No   Sig: Take  1 tablet (5 mg total) by mouth daily at bedtime   budesonide-formoterol (SYMBICORT) 160-4.5 mcg/act inhaler Past Week Self Yes Yes   Sig: Inhale 2 puffs every 12 (twelve) hours   dexlansoprazole (DEXILANT) 60 MG capsule Past Week Self No Yes   Sig: TAKE 1 CAPSULE BY MOUTH EVERY DAY IN THE MORNING   dronabinol (MARINOL) 5 MG capsule Not Taking Self No No   Sig: Take 1 capsule (5 mg total) by mouth 3 (three) times a day   Patient not taking: Reported on 10/24/2024   famotidine (PEPCID) 40 MG tablet  Self No No   Sig: TAKE 1 TABLET BY MOUTH DAILY AT BEDTIME   fluconazole (DIFLUCAN) 150 mg tablet Not Taking  Yes No   Sig: Take 150 mg by mouth once   Patient not taking: Reported on 10/24/2024   furosemide (LASIX) 20 mg tablet Past Week Self Yes Yes   Sig: Take 40 mg by mouth daily   insulin aspart (NovoLOG FlexPen) 100 UNIT/ML injection pen Past Month Self No Yes   Sig: INJECT 10 UNITS pre-BREAKFAST, 8 UNITS pre-LUNCH and 12 UNITS pre-DINNER plus scale; max 50 UNITS/day   minocycline (MINOCIN) 100 mg capsule Not Taking  Yes No   Sig: Take 100 mg by mouth every 12 (twelve) hours   Patient not taking: Reported on 10/24/2024   multivitamin (THERAGRAN) TABS Past Week Self Yes Yes   Sig: Take 1 tablet by mouth daily   naloxone (NARCAN) 4 mg/0.1 mL nasal spray Not Taking Self No No   Si.1 mL (4 mg total) into each nostril every 3 (three) minutes as needed for opioid reversal or respiratory depression   Patient not taking: Reported on 10/24/2024   ondansetron (ZOFRAN) 4 mg tablet  Self Yes No   Sig: Take 4 mg by mouth every 8 (eight) hours as needed for nausea   oxyCODONE (ROXICODONE) 10 MG TABS Past Week Self No Yes   Sig: Take 1 tablet (10 mg total) by mouth see administration instructions Take 1 to 2 tablets every 8 hours as needed for severe pain. Max dose 6 tabs a day Do not start before October 3, 2024.   oxybutynin (DITROPAN XL) 15 MG 24 hr tablet Past Week Self No Yes   Sig: Take 1 tablet (15 mg total) by mouth  daily at bedtime   pancrelipase, Lip-Prot-Amyl, (CREON) 24,000 units Past Week Self Yes Yes   Sig: Take 48,000 Units by mouth 3 (three) times a day with meals   polyethylene glycol (MIRALAX) 17 g packet  Self No No   Sig: Take 17 g by mouth 2 (two) times a day   Patient taking differently: Take 17 g by mouth 2 (two) times a day as needed   predniSONE 10 mg tablet Not Taking Self Yes No   Sig: Take 10 mg by mouth daily Taking 15 mg now   Patient not taking: Reported on 10/24/2024   pregabalin (LYRICA) 150 mg capsule Past Week Self No Yes   Sig: Take 1 capsule (150 mg total) by mouth 2 times a day   promethazine-dextromethorphan (PHENERGAN-DM) 6.25-15 mg/5 mL oral syrup  Self No No   Sig: Take 5 mL by mouth 4 (four) times a day as needed for cough   Patient not taking: Reported on 10/3/2024   tacrolimus (PROGRAF) 1 mg capsule Past Week Self No Yes   Sig: Take 2 capsules (2 mg total) by mouth 2 (two) times a day   tamsulosin (FLOMAX) 0.4 mg Past Week Self No Yes   Sig: Take 1 capsule (0.4 mg total) by mouth daily with dinner   valACYclovir (VALTREX) 500 mg tablet Past Week Self Yes Yes   Sig: Take 500 mg by mouth daily   vitamin B-12 (VITAMIN B-12) 1,000 mcg tablet Past Week Self Yes Yes   Sig: Take by mouth daily      Facility-Administered Medications: None     Current Discharge Medication List        START taking these medications    Details   doxycycline 100 mg in sodium chloride 0.9 % 100 mL IVPB Inject 100 mg into a catheter in a vein over 60 minutes at 100 mL/hr every 12 (twelve) hours for 14 days    Associated Diagnoses: Hx of lung transplant (HCC)      Famotidine, PF, (PEPCID) 20 mg/2 mL SOLN Inject 2 mL (20 mg total) into a catheter in a vein over 2 minutes every 24 hours    Associated Diagnoses: Bilious vomiting with nausea      metoclopramide (REGLAN) 5 mg/mL Inject 2 mL (10 mg total) into a catheter in a vein every 6 (six) hours  Qty: 2 mL, Refills: 0    Associated Diagnoses: Bilious vomiting with nausea       ondansetron (ZOFRAN) 4 mg/2 mL injection Inject 2 mL (4 mg total) into a catheter in a vein every 6 (six) hours as needed for vomiting or nausea    Associated Diagnoses: Bilious vomiting with nausea      pantoprazole (PROTONIX) 40 mg injection Inject 40 mg into a catheter in a vein over 2 minutes every 12 (twelve) hours    Associated Diagnoses: Bilious vomiting with nausea      sodium chloride 3 % inhalation solution Take 4 mL by nebulization 2 (two) times a day    Associated Diagnoses: Hx of lung transplant ([facility])           CONTINUE these medications which have NOT CHANGED    Details   Ascorbic Acid (vitamin C) 100 MG tablet Take 100 mg by mouth daily      !! BD Pen Needle Abi U/F 32G X 4 MM MISC 4 times daily  Qty: 400 each, Refills: 1    Associated Diagnoses: Type 1 diabetes mellitus with diabetic chronic kidney disease, unspecified CKD stage ([facility])      budesonide-formoterol (SYMBICORT) 160-4.5 mcg/act inhaler Inhale 2 puffs every 12 (twelve) hours      Cholecalciferol (Vitamin D3) 125 MCG (5000 UT) TABS Take 5,000 Units by mouth daily      Continuous Blood Gluc  (Dexcom G7 ) SUKHI Use 1 each continuous  Qty: 1 each, Refills: 0    Associated Diagnoses: Type 1 diabetes mellitus with diabetic chronic kidney disease, unspecified CKD stage ([facility])      Continuous Glucose Sensor (Dexcom G7 Sensor) Use 1 Device every 10 days  Qty: 3 each, Refills: 5    Associated Diagnoses: Type 1 diabetes mellitus with diabetic chronic kidney disease, unspecified CKD stage ([facility])      dexlansoprazole (DEXILANT) 60 MG capsule TAKE 1 CAPSULE BY MOUTH EVERY DAY IN THE MORNING  Qty: 100 capsule, Refills: 1    Associated Diagnoses: Gastroesophageal reflux disease with esophagitis without hemorrhage      furosemide (LASIX) 20 mg tablet Take 40 mg by mouth daily      insulin aspart (NovoLOG FlexPen) 100 UNIT/ML injection pen INJECT 10 UNITS pre-BREAKFAST, 8 UNITS pre-LUNCH and 12 UNITS pre-DINNER plus scale; max 50  UNITS/day  Qty: 45 mL, Refills: 1    Associated Diagnoses: Type 1 diabetes mellitus with diabetic chronic kidney disease, unspecified CKD stage ([facility])      multivitamin (THERAGRAN) TABS Take 1 tablet by mouth daily      oxybutynin (DITROPAN XL) 15 MG 24 hr tablet Take 1 tablet (15 mg total) by mouth daily at bedtime  Qty: 90 tablet, Refills: 3    Associated Diagnoses: Urine frequency      oxyCODONE (ROXICODONE) 10 MG TABS Take 1 tablet (10 mg total) by mouth see administration instructions Take 1 to 2 tablets every 8 hours as needed for severe pain. Max dose 6 tabs a day Do not start before October 3, 2024.  Qty: 180 tablet, Refills: 0    Associated Diagnoses: Cystic fibrosis ([facility])      pancrelipase, Lip-Prot-Amyl, (CREON) 24,000 units Take 48,000 Units by mouth 3 (three) times a day with meals      pregabalin (LYRICA) 150 mg capsule Take 1 capsule (150 mg total) by mouth 2 times a day  Qty: 180 capsule, Refills: 0    Associated Diagnoses: Right foot pain; Chronic pain syndrome; Lumbar radiculopathy      tacrolimus (PROGRAF) 1 mg capsule Take 2 capsules (2 mg total) by mouth 2 (two) times a day    Associated Diagnoses: Hx of lung transplant ([facility])      tamsulosin (FLOMAX) 0.4 mg Take 1 capsule (0.4 mg total) by mouth daily with dinner  Qty: 14 capsule, Refills: 0    Associated Diagnoses: Nephrolithiasis      Turmeric 500 MG CAPS Take by mouth      valACYclovir (VALTREX) 500 mg tablet Take 500 mg by mouth daily      vitamin B-12 (VITAMIN B-12) 1,000 mcg tablet Take by mouth daily      acetaminophen (TYLENOL) 325 mg tablet Take 3 tablets (975 mg total) by mouth every 8 (eight) hours  Refills: 0    Associated Diagnoses: Chest pain, unspecified type      albuterol (2.5 mg/3 mL) 0.083 % nebulizer solution Take 6 mL (5 mg total) by nebulization 2 (two) times a day  Refills: 0    Associated Diagnoses: Lung transplant status, bilateral ([facility])      albuterol (PROVENTIL HFA,VENTOLIN HFA) 90 mcg/act inhaler INHALE 2 PUFFS EVERY 8  HOURS AS NEEDED FOR SHORTNESS OF BREATH OR WHEEZING.      ALPRAZolam (XANAX) 1 mg tablet Take 1 tablet (1 mg total) by mouth daily at bedtime as needed for anxiety or sleep  Qty: 30 tablet, Refills: 0    Associated Diagnoses: Cystic fibrosis (HCC)      apixaban (ELIQUIS) 5 mg Take 1 tablet (5 mg total) by mouth 2 (two) times a day  Qty: 30 tablet, Refills: 0    Associated Diagnoses: Acute pulmonary embolism, unspecified pulmonary embolism type, unspecified whether acute cor pulmonale present (HCC)      atovaquone (MEPRON) 750 mg/5 mL suspension Take 1,500 mg by mouth daily      azithromycin (ZITHROMAX) 250 mg tablet Take 250 mg by mouth 3 (three) times a week      bisacodyl (DULCOLAX) 5 mg EC tablet Take 1 tablet (5 mg total) by mouth daily at bedtime  Qty: 30 tablet, Refills: 0    Associated Diagnoses: Drug-induced constipation      dronabinol (MARINOL) 5 MG capsule Take 1 capsule (5 mg total) by mouth 3 (three) times a day  Qty: 90 capsule, Refills: 0    Associated Diagnoses: Other disorders of lung      famotidine (PEPCID) 40 MG tablet TAKE 1 TABLET BY MOUTH DAILY AT BEDTIME  Qty: 30 tablet, Refills: 5    Associated Diagnoses: Gastroesophageal reflux disease with esophagitis without hemorrhage      fluconazole (DIFLUCAN) 150 mg tablet Take 150 mg by mouth once      Insulin Disposable Pump (Omnipod 5 G6 Intro, Gen 5,) KIT Use 1 Units continuous  Qty: 1 kit, Refills: 0    Associated Diagnoses: Type 1 diabetes mellitus with diabetic chronic kidney disease, unspecified CKD stage (HCC)      Insulin Disposable Pump (Omnipod 5 G6 Pods, Gen 5,) MISC Use 1 Units every other day  Qty: 15 each, Refills: 3    Associated Diagnoses: Type 1 diabetes mellitus with diabetic chronic kidney disease, unspecified CKD stage ([facility])      Insulin Glargine-yfgn (Semglee, yfgn,) 100 UNIT/ML SOPN Inject 0.12 mL (12 Units total) under the skin every morning  Qty: 15 mL, Refills: 1    Associated Diagnoses: Type 1 diabetes mellitus with  diabetic chronic kidney disease, unspecified CKD stage (HCC)      !! Insulin Pen Needle (Comfort EZ Pen Needles) 33G X 4 MM MISC Use to inject insulin 4 times a day  Qty: 100 each, Refills: 3    Associated Diagnoses: Type 1 diabetes mellitus with diabetic chronic kidney disease, unspecified CKD stage (HCC)      naloxone (NARCAN) 4 mg/0.1 mL nasal spray 0.1 mL (4 mg total) into each nostril every 3 (three) minutes as needed for opioid reversal or respiratory depression  Qty: 2 each, Refills: 5    Associated Diagnoses: Continuous opioid dependence (HCC)      ondansetron (ZOFRAN) 4 mg tablet Take 4 mg by mouth every 8 (eight) hours as needed for nausea      polyethylene glycol (MIRALAX) 17 g packet Take 17 g by mouth 2 (two) times a day  Refills: 0    Associated Diagnoses: Drug-induced constipation      predniSONE 10 mg tablet Take 10 mg by mouth daily Taking 15 mg now      Respiratory Therapy Supplies (Nebulizer) SUKHI Please dispense nebulizer machine       !! - Potential duplicate medications found. Please discuss with provider.        STOP taking these medications       minocycline (MINOCIN) 100 mg capsule Comments:   Reason for Stopping:         promethazine-dextromethorphan (PHENERGAN-DM) 6.25-15 mg/5 mL oral syrup Comments:   Reason for Stopping:             No discharge procedures on file.  ED SEPSIS DOCUMENTATION   Time reflects when diagnosis was documented in both MDM as applicable and the Disposition within this note       Time User Action Codes Description Comment    10/22/2024 12:53 AM Maggie Cancino Add [K92.2] Upper GI bleed     10/22/2024 12:53 AM Maggie Cancino Add [K20.90] Esophagitis     10/22/2024 12:53 AM Maggie Cancino Add [R41.82] Altered mental status     10/22/2024  6:56 PM SchJose Alfredo johnsonasia Add [R11.14] Bilious vomiting with nausea     10/22/2024  6:56 PM Anna Coats Add [Z94.2] Hx of lung transplant (HCC)     10/23/2024  7:26 AM SchAnna johnson Add [R78.81] Bacteremia due to  Gram-positive bacteria                  Maggie Cancino DO  10/24/24 0108

## 2024-10-22 NOTE — ASSESSMENT & PLAN NOTE
History of severe GERD status post fundoplication in 2023  Protonix for milligrams IV every 12 hours

## 2024-10-22 NOTE — ASSESSMENT & PLAN NOTE
"-This seems to be a chronic symptom but is worsening recently  -Suspect this is multifactorial to include GERD, gastroparesis, polypharmacy  -EGD 4/23/24 notes mild GE junction inflammation with somewhat \"snug\" GE junction.  Wrap noted to be intact  -UGI series 10/4/24 with small hiatal hernia, gastric wrap above the diaphragm, no BRANDON observed  -Plan is for EGD with placement of BRAVO pH probe which was scheduled for tomorrow with Dr. Ko  -Doubt repeat EGD will be helpful  -CT AP on admission with diffuse esophageal wall thickening and mucosal hyperenhancement, rectosigmoid colonic wall prominence without inflammation may be due to under distention, constipation  -Continue PPI BID and H2RA qHS  -Unclear why patient has not been taking Reglan - last prescribed in 2022  -No listed allergy or documented side effect of tardive dyskinesia  -Will restart while hospitalized    Admission diagnosis notes hematemesis  Patient has a bin with emesis in it which is not bloody  Per admission reports there has been no hematemesis  Patient denies hematemesis  Hgb remains normal at 11.8  "

## 2024-10-22 NOTE — ASSESSMENT & PLAN NOTE
Unclear, will need to get more history from the family.  Patient identifies that she is confused and answers yes.  I anticipate is due to polypharmacy with her having Xanax, oxycodone, Lyrica and also Marinol. Those medications been discontinued  Questionable if supratherapeutic tacrolimus level pending tacrolimus blood work.  CT head showing Small extra-axial hyperdensity at the anterior aspect of the left middle cranial fossa. Possible adjacent osseous remodeling. Favor small meningioma with hemorrhage felt less likely.   IV fluids  Pending MRI brain.  Pending transfer to [facility], lung transplant department.

## 2024-10-22 NOTE — CONSULTS
"Consultation - Gastroenterology   Name: Maximilian Mann 48 y.o. female I MRN: [id number]  Unit/Bed#: -01 I Date of Admission: 10/21/2024   Date of Service: 10/22/2024 I Hospital Day: 0   Inpatient consult to gastroenterology  Consult performed by: Yeni Dempsey PA-C  Consult ordered by: Mehran Juarez MD        Physician Requesting Evaluation: Anna Coats MD   Reason for Evaluation / Principal Problem: Nausea and Vomiting    Assessment & Plan  Nausea and vomiting  -This seems to be a chronic symptom but is worsening recently  -Suspect this is multifactorial to include GERD, gastroparesis, polypharmacy  -EGD 4/23/24 notes mild GE junction inflammation with somewhat \"snug\" GE junction.  Wrap noted to be intact  -UGI series 10/4/24 with small hiatal hernia, gastric wrap above the diaphragm, no BRANDON observed  -Plan is for EGD with placement of BRAVO pH probe which was scheduled for tomorrow with Dr. Ko  -Doubt repeat EGD will be helpful  -CT AP on admission with diffuse esophageal wall thickening and mucosal hyperenhancement, rectosigmoid colonic wall prominence without inflammation may be due to under distention, constipation  -Continue PPI BID and H2RA qHS  -Unclear why patient has not been taking Reglan - last prescribed in 2022  -No listed allergy or documented side effect of tardive dyskinesia  -Will restart while hospitalized    Admission diagnosis notes hematemesis  Patient has a bin with emesis in it which is not bloody  Per admission reports there has been no hematemesis  Patient denies hematemesis  Hgb remains normal at 11.8  Gastroparesis  -Restart Reglan QID  GERD (gastroesophageal reflux disease)  -S/P TIF in 2022  -Maintains on Dexilant 60mg daily and Pepcid qHS in the outpatient setting  -Scheduled for EGD with BRAVO placement with Dr. Ko tomorrow - will need to be rescheduled  -Plan will be made pending results of pH testing  Pancreatic insufficiency  -Creon 48,000 " "units with meals as ordered in the outpatient setting        History of Present Illness   HPI:  Maximilian Mann is a 48 y.o. female with a history of cystic fibrosis status post lung transplant x 2, pancreatic insufficiency, severe GERD, gastroparesis, diabetes mellitus, chronic kidney disease, chronic pain disorder, DVT maintained on Eliquis who was brought to [facility] with reported altered mental status.  There is also been reports that the patient has been vomiting for 3 days.  The patient is thankfully more awake and able to provide a history upon interview today.  She reports that she has had significant upper GI symptoms for quite some time.  She has been following in the outpatient setting with .  She has severe heartburn, upper abdominal pain, nausea and recurrent episodes of vomiting.  She reports that she vomits frequently.  For her GERD she maintains on Dexilant 60 mg once daily and Pepcid 40 mg at bedtime.  She is status post TIF in 2022 which did seem to improve her symptoms but only for a short time.  She recently underwent an upper endoscopy in the outpatient setting in April of this year which noted mild GE junction inflammation with a somewhat \"snug\" GE junction.  The wrap was noted to be intact.  She then had an upper GI series which noted a small hiatal hernia and her gastric wrap above the diaphragm.  No reflux was observed.  Plan had been for patient undergo EGD with placement of a Bravo pH probe but this was scheduled for tomorrow.  Admission notes had documented hematemesis but the patient adamantly denies this.  She has an emesis been next to her with nonbloody vomitus in it.  She denies any recent issues with melena or rectal bleeding.  Her hemoglobin has been stable since admission.  Her gastroparesis was diagnosed in 2022.  She was initially treated with Reglan however it is very unclear as to why this was stopped.  Perceivable her patient's symptoms improved and " it was stopped at that time.  More recent notes document the patient has been maintaining on Zofran however it is unclear if this has been helpful at all.  She has pancreatic insufficiency and maintains on Creon 48,000 units 3 times daily.  She has chronic constipation for which she is using MiraLAX twice daily.  She had previously been on Linzess.  Again, it is unclear why this was stopped.    Patient will be seen and examined by Dr. Serrano. All ang decisions were made by Dr. Serrano    Review of Systems   Constitutional:  Positive for appetite change and fatigue.   HENT:  Negative for trouble swallowing.    Respiratory:  Negative for cough and shortness of breath.    Cardiovascular:  Negative for chest pain and palpitations.   Gastrointestinal:  Positive for abdominal pain, constipation, nausea and vomiting.   Psychiatric/Behavioral:  Positive for confusion.      I have reviewed the patient's PMH, PSH, Social History, Family History, Meds, and Allergies  Historical Information   Past Medical History:   Diagnosis Date    Abnormal uterine bleeding (AUB)     ABPA (allergic bronchopulmonary aspergillosis) ([facility]) 07/08/2012    Acute kidney injury (HCC) 09/08/2017    Acute on chronic respiratory failure ([facility]) 03/04/2017    Last Assessment & Plan:  Formatting of this note might be different from the original. Recurrent with clinical worsening with enlarging PTX, s/p multiple chest tubes, but new fevers 6/19 and worsening leukocytosis indicating infection as cause of 6/19 decompensation with profound hypoxia in setting of shunting through right lung and continued pneumothorax s/p 2nd chest tube placement.  6/27: Right    Anaphylaxis 06/06/2022    Anemia     Asthma     Chest pain 09/21/2021    Chronic pain     Chronic respiratory failure with hypoxia ([facility]) 09/07/2021    COVID-19 06/03/2022    Cystic fibrosis (HCC)     Deep vein thrombosis (HCC)     Diabetes mellitus (HCC)     Fever 09/22/2024    GERD (gastroesophageal reflux  disease)     HBP (high blood pressure)     HCAP (healthcare-associated pneumonia) 03/06/2020    History of transfusion     Influenza B 02/23/2020    Kidney stone     Moderate protein-calorie malnutrition (HCC) 01/08/2022    Neutropenic fever  (HCC) 09/26/2020    Pneumonia due to Pseudomonas species (HCC) 10/28/2010    Last Assessment & Plan:  Formatting of this note might be different from the original. Polymicrobial pna w/ pseudomonas, M. Abscessus, Cadida glabrata/albicans    PONV (postoperative nausea and vomiting)     Pseudomonas aeruginosa infection 09/20/2012    Renal calculus     Squamous cell skin cancer 01/12/2023    SCCIS- Left upper thigh    Transplant recipient     Lung- 2013, 2017    Ulcerative colitis (HCC)     Wellness examination 02/04/2013    Formatting of this note might be different from the original. Discussed with patient and acknowledgement form signed on 2/4/2013 (ALYSHA)     Past Surgical History:   Procedure Laterality Date    APPENDECTOMY      AUGMENTATION MAMMAPLASTY Bilateral 2004    AUGMENTATION MAMMAPLASTY Right 2021    removed     BREAST SURGERY      Aug.     BRONCHOSCOPY      CHOLECYSTECTOMY      COLONOSCOPY      EGD  07/2019    [facility]     FL RETROGRADE PYELOGRAM  01/09/2020    FL RETROGRADE PYELOGRAM  02/04/2020    FL RETROGRADE PYELOGRAM  9/20/2024    GASTROSCOPY  03/01/2024    GASTROSTOMY TUBE, PLACE      IR BALLOON-OCCLUDED ANTEGRADE TRANSVENOUS OBLITERATION (BATO)  11/22/2022    IR PORT PLACEMENT  03/22/2022    IR PORT REMOVAL  01/10/2022    IR PORT STRIPPING  11/25/2022    IR TUNNELED CENTRAL LINE PLACEMENT  01/13/2022    LUNG SURGERY      LUNG TRANSPLANT      X2    OK CYSTO BLADDER W/URETERAL CATHETERIZATION Right 01/09/2020    Procedure: CYSTOSCOPY RETROGRADE PYELOGRAM WITH INSERTION STENT URETERAL;  Surgeon: Patricio Perry MD;  Location: [facility];  Service: Urology    OK CYSTO/URETERO W/LITHOTRIPSY &INDWELL STENT INSRT Right 02/04/2020    Procedure: CYSTOSCOPY  URETEROSCOPY WITH LITHOTRIPSY HOLMIUM LASER, RETROGRADE PYELOGRAM AND INSERTION STENT URETERAL;  Surgeon: Patricio Perry MD;  Location: [facility];  Service: Urology    NE CYSTO/URETERO W/LITHOTRIPSY &INDWELL STENT INSRT Left 2024    Procedure: CYSTOSCOPY URETEROSCOPY WITH LITHOTRIPSY HOLMIUM LASER, RETROGRADE PYELOGRAM AND INSERTION STENT URETERAL, BASKET STONE EXTRACTION;  Surgeon: Lee Briceno MD;  Location:  MAIN OR;  Service: Urology    SKIN BIOPSY      US GUIDED VASCULAR ACCESS  2017     Social History     Tobacco Use    Smoking status: Former     Current packs/day: 0.00     Average packs/day: 0.5 packs/day for 12.0 years (6.0 ttl pk-yrs)     Types: Cigarettes     Start date: 1993     Quit date: 2005     Years since quittin.8     Passive exposure: Past    Smokeless tobacco: Never   Vaping Use    Vaping status: Never Used   Substance and Sexual Activity    Alcohol use: Yes     Alcohol/week: 2.0 standard drinks of alcohol     Types: 2 Glasses of wine per week     Comment: social    Drug use: Yes     Frequency: 2.0 times per week     Types: Marijuana     Comment: medical edible marijuana prescribed    Sexual activity: Yes     Partners: Male     Birth control/protection: None     E-Cigarette/Vaping    E-Cigarette Use Never User      E-Cigarette/Vaping Substances    Nicotine No     THC No     CBD No     Flavoring No     Other No     Unknown No        Prior to Admission Medications   Prescriptions Last Dose Informant Patient Reported? Taking?   ALPRAZolam (XANAX) 1 mg tablet  Self No No   Sig: Take 1 tablet (1 mg total) by mouth daily at bedtime as needed for anxiety or sleep   Ascorbic Acid (vitamin C) 100 MG tablet  Self Yes No   Sig: Take 100 mg by mouth daily   BD Pen Needle Abi U/F 32G X 4 MM MISC  Self No No   Si times daily   Cholecalciferol (Vitamin D3) 125 MCG (5000 UT) TABS  Self Yes No   Sig: Take 5,000 Units by mouth daily   Continuous Blood Gluc  (Dexcom G7  ) SUKHI  Self No No   Sig: Use 1 each continuous   Continuous Glucose Sensor (Dexcom G7 Sensor)  Self No No   Sig: Use 1 Device every 10 days   Insulin Disposable Pump (Omnipod 5 G6 Intro, Gen 5,) KIT  Self No No   Sig: Use 1 Units continuous   Insulin Disposable Pump (Omnipod 5 G6 Pods, Gen 5,) MISC  Self No No   Sig: Use 1 Units every other day   Insulin Glargine-yfgn (Semglee, yfgn,) 100 UNIT/ML SOPN  Self No No   Sig: Inject 0.12 mL (12 Units total) under the skin every morning   Insulin Pen Needle (Comfort EZ Pen Needles) 33G X 4 MM MISC  Self No No   Sig: Use to inject insulin 4 times a day   Respiratory Therapy Supplies (Nebulizer) SUKHI  Self Yes No   Sig: Please dispense nebulizer machine   Turmeric 500 MG CAPS  Self Yes No   Sig: Take by mouth   acetaminophen (TYLENOL) 325 mg tablet  Self No No   Sig: Take 3 tablets (975 mg total) by mouth every 8 (eight) hours   albuterol (2.5 mg/3 mL) 0.083 % nebulizer solution  Self No No   Sig: Take 6 mL (5 mg total) by nebulization 2 (two) times a day   albuterol (PROVENTIL HFA,VENTOLIN HFA) 90 mcg/act inhaler  Self Yes No   Sig: INHALE 2 PUFFS EVERY 8 HOURS AS NEEDED FOR SHORTNESS OF BREATH OR WHEEZING.   apixaban (ELIQUIS) 5 mg  Self No No   Sig: Take 1 tablet (5 mg total) by mouth 2 (two) times a day   atovaquone (MEPRON) 750 mg/5 mL suspension  Self Yes No   Sig: Take 1,500 mg by mouth daily   azithromycin (ZITHROMAX) 250 mg tablet  Self Yes No   Sig: Take 250 mg by mouth 3 (three) times a week   bisacodyl (DULCOLAX) 5 mg EC tablet  Self No No   Sig: Take 1 tablet (5 mg total) by mouth daily at bedtime   budesonide-formoterol (SYMBICORT) 160-4.5 mcg/act inhaler  Self Yes No   Sig: Inhale 2 puffs every 12 (twelve) hours   dexlansoprazole (DEXILANT) 60 MG capsule  Self No No   Sig: TAKE 1 CAPSULE BY MOUTH EVERY DAY IN THE MORNING   dronabinol (MARINOL) 5 MG capsule  Self No No   Sig: Take 1 capsule (5 mg total) by mouth 3 (three) times a day   famotidine  (PEPCID) 40 MG tablet  Self No No   Sig: TAKE 1 TABLET BY MOUTH DAILY AT BEDTIME   fluconazole (DIFLUCAN) 150 mg tablet   Yes No   Sig: Take 150 mg by mouth once   furosemide (LASIX) 20 mg tablet  Self Yes No   Sig: Take 40 mg by mouth daily   insulin aspart (NovoLOG FlexPen) 100 UNIT/ML injection pen  Self No No   Sig: INJECT 10 UNITS pre-BREAKFAST, 8 UNITS pre-LUNCH and 12 UNITS pre-DINNER plus scale; max 50 UNITS/day   minocycline (MINOCIN) 100 mg capsule   Yes Yes   Sig: Take 100 mg by mouth every 12 (twelve) hours   multivitamin (THERAGRAN) TABS  Self Yes No   Sig: Take 1 tablet by mouth daily   naloxone (NARCAN) 4 mg/0.1 mL nasal spray  Self No No   Si.1 mL (4 mg total) into each nostril every 3 (three) minutes as needed for opioid reversal or respiratory depression   ondansetron (ZOFRAN) 4 mg tablet  Self Yes No   Sig: Take 4 mg by mouth every 8 (eight) hours as needed for nausea   oxyCODONE (ROXICODONE) 10 MG TABS  Self No No   Sig: Take 1 tablet (10 mg total) by mouth see administration instructions Take 1 to 2 tablets every 8 hours as needed for severe pain. Max dose 6 tabs a day Do not start before October 3, 2024.   oxybutynin (DITROPAN XL) 15 MG 24 hr tablet  Self No No   Sig: Take 1 tablet (15 mg total) by mouth daily at bedtime   pancrelipase, Lip-Prot-Amyl, (CREON) 24,000 units  Self Yes No   Sig: Take 48,000 Units by mouth 3 (three) times a day with meals   polyethylene glycol (MIRALAX) 17 g packet  Self No No   Sig: Take 17 g by mouth 2 (two) times a day   Patient taking differently: Take 17 g by mouth 2 (two) times a day as needed   predniSONE 10 mg tablet  Self Yes No   Sig: Take 10 mg by mouth daily Taking 15 mg now   pregabalin (LYRICA) 150 mg capsule  Self No No   Sig: Take 1 capsule (150 mg total) by mouth 2 times a day   promethazine-dextromethorphan (PHENERGAN-DM) 6.25-15 mg/5 mL oral syrup  Self No No   Sig: Take 5 mL by mouth 4 (four) times a day as needed for cough   Patient not  taking: Reported on 10/3/2024   tacrolimus (PROGRAF) 1 mg capsule  Self No No   Sig: Take 2 capsules (2 mg total) by mouth 2 (two) times a day   tamsulosin (FLOMAX) 0.4 mg  Self No No   Sig: Take 1 capsule (0.4 mg total) by mouth daily with dinner   valACYclovir (VALTREX) 500 mg tablet  Self Yes No   Sig: Take 500 mg by mouth daily   vitamin B-12 (VITAMIN B-12) 1,000 mcg tablet  Self Yes No   Sig: Take by mouth daily      Facility-Administered Medications: None       Objective :  Temp:  [98 °F (36.7 °C)-101.7 °F (38.7 °C)] 98.2 °F (36.8 °C)  HR:  [] 98  BP: (113-126)/(64-80) 113/79  Resp:  [16-22] 18  SpO2:  [92 %-99 %] 97 %  O2 Device: None (Room air)    Physical Exam  Constitutional:       General: She is not in acute distress.     Appearance: Normal appearance.   HENT:      Head: Normocephalic and atraumatic.   Cardiovascular:      Rate and Rhythm: Normal rate and regular rhythm.   Pulmonary:      Breath sounds: Decreased breath sounds present.   Neurological:      Mental Status: She is alert. She is confused.           Lab Results: I have reviewed the following results:CBC/BMP:   .     10/22/24  0423   WBC 16.95*   HGB 11.8   HCT 39.0   *   SODIUM 140   K 4.0   *   CO2 25   BUN 57*   CREATININE 1.21   GLUC 261*   MG 1.5*    , LFTs:   .     10/22/24  0423   AST 12*   ALT 15   ALB 3.0*   TBILI 0.47   ALKPHOS 63    , PTT/INR:No new results in last 24 hours.

## 2024-10-23 ENCOUNTER — TELEPHONE (OUTPATIENT)
Dept: GASTROENTEROLOGY | Facility: CLINIC | Age: 49
End: 2024-10-23

## 2024-10-23 PROBLEM — R78.81 POSITIVE BLOOD CULTURE: Status: ACTIVE | Noted: 2024-10-23

## 2024-10-23 PROBLEM — R84.5 POSITIVE CULTURE FINDINGS IN SPUTUM: Status: ACTIVE | Noted: 2024-10-23

## 2024-10-23 PROBLEM — R65.10 SIRS (SYSTEMIC INFLAMMATORY RESPONSE SYNDROME) (HCC): Status: ACTIVE | Noted: 2024-10-23

## 2024-10-23 LAB
GLUCOSE SERPL-MCNC: 125 MG/DL (ref 65–140)
GLUCOSE SERPL-MCNC: 131 MG/DL (ref 65–140)
GLUCOSE SERPL-MCNC: 153 MG/DL (ref 65–140)
GLUCOSE SERPL-MCNC: 181 MG/DL (ref 65–140)

## 2024-10-23 PROCEDURE — 82948 REAGENT STRIP/BLOOD GLUCOSE: CPT

## 2024-10-23 PROCEDURE — 94640 AIRWAY INHALATION TREATMENT: CPT

## 2024-10-23 PROCEDURE — 99255 IP/OBS CONSLTJ NEW/EST HI 80: CPT | Performed by: INTERNAL MEDICINE

## 2024-10-23 PROCEDURE — 0202U NFCT DS 22 TRGT SARS-COV-2: CPT | Performed by: INTERNAL MEDICINE

## 2024-10-23 PROCEDURE — 94664 DEMO&/EVAL PT USE INHALER: CPT

## 2024-10-23 PROCEDURE — 99232 SBSQ HOSP IP/OBS MODERATE 35: CPT | Performed by: INTERNAL MEDICINE

## 2024-10-23 PROCEDURE — 94760 N-INVAS EAR/PLS OXIMETRY 1: CPT

## 2024-10-23 PROCEDURE — 99232 SBSQ HOSP IP/OBS MODERATE 35: CPT

## 2024-10-23 RX ORDER — FAMOTIDINE 10 MG/ML
20 INJECTION, SOLUTION INTRAVENOUS
Start: 2024-10-23 | End: 2024-10-26

## 2024-10-23 RX ORDER — SODIUM CHLORIDE FOR INHALATION 3 %
4 VIAL, NEBULIZER (ML) INHALATION
Start: 2024-10-23 | End: 2024-10-26

## 2024-10-23 RX ORDER — ONDANSETRON 2 MG/ML
4 INJECTION INTRAMUSCULAR; INTRAVENOUS EVERY 6 HOURS PRN
Start: 2024-10-23 | End: 2024-10-26

## 2024-10-23 RX ORDER — PANTOPRAZOLE SODIUM 40 MG/10ML
40 INJECTION, POWDER, LYOPHILIZED, FOR SOLUTION INTRAVENOUS EVERY 12 HOURS SCHEDULED
Start: 2024-10-23 | End: 2024-10-26

## 2024-10-23 RX ORDER — SCOLOPAMINE TRANSDERMAL SYSTEM 1 MG/1
1 PATCH, EXTENDED RELEASE TRANSDERMAL
Status: DISCONTINUED | OUTPATIENT
Start: 2024-10-23 | End: 2024-10-26 | Stop reason: HOSPADM

## 2024-10-23 RX ORDER — FUROSEMIDE 10 MG/ML
20 INJECTION INTRAMUSCULAR; INTRAVENOUS DAILY
Status: DISCONTINUED | OUTPATIENT
Start: 2024-10-23 | End: 2024-10-26 | Stop reason: HOSPADM

## 2024-10-23 RX ORDER — ACETAMINOPHEN 10 MG/ML
1000 INJECTION, SOLUTION INTRAVENOUS EVERY 6 HOURS PRN
Status: DISCONTINUED | OUTPATIENT
Start: 2024-10-23 | End: 2024-10-25

## 2024-10-23 RX ORDER — METOCLOPRAMIDE HYDROCHLORIDE 5 MG/ML
10 INJECTION INTRAMUSCULAR; INTRAVENOUS EVERY 6 HOURS SCHEDULED
Qty: 2 ML | Refills: 0 | Status: SHIPPED | OUTPATIENT
Start: 2024-10-23 | End: 2024-10-26

## 2024-10-23 RX ORDER — VALACYCLOVIR HYDROCHLORIDE 500 MG/1
500 TABLET, FILM COATED ORAL DAILY
Status: DISCONTINUED | OUTPATIENT
Start: 2024-10-24 | End: 2024-10-26 | Stop reason: HOSPADM

## 2024-10-23 RX ORDER — ENOXAPARIN SODIUM 100 MG/ML
1 INJECTION SUBCUTANEOUS EVERY 12 HOURS SCHEDULED
Status: DISCONTINUED | OUTPATIENT
Start: 2024-10-23 | End: 2024-10-26 | Stop reason: HOSPADM

## 2024-10-23 RX ADMIN — ONDANSETRON 4 MG: 2 INJECTION INTRAMUSCULAR; INTRAVENOUS at 09:19

## 2024-10-23 RX ADMIN — SCOPALAMINE 1 PATCH: 1 PATCH, EXTENDED RELEASE TRANSDERMAL at 11:06

## 2024-10-23 RX ADMIN — FAMOTIDINE 20 MG: 10 INJECTION, SOLUTION INTRAVENOUS at 20:51

## 2024-10-23 RX ADMIN — DOXYCYCLINE 100 MG: 100 INJECTION, POWDER, LYOPHILIZED, FOR SOLUTION INTRAVENOUS at 00:02

## 2024-10-23 RX ADMIN — INSULIN LISPRO 1 UNITS: 100 INJECTION, SOLUTION INTRAVENOUS; SUBCUTANEOUS at 17:40

## 2024-10-23 RX ADMIN — ENOXAPARIN SODIUM 50 MG: 60 INJECTION SUBCUTANEOUS at 20:48

## 2024-10-23 RX ADMIN — INSULIN GLARGINE 5 UNITS: 100 INJECTION, SOLUTION SUBCUTANEOUS at 08:55

## 2024-10-23 RX ADMIN — METOCLOPRAMIDE 10 MG: 5 INJECTION, SOLUTION INTRAMUSCULAR; INTRAVENOUS at 06:12

## 2024-10-23 RX ADMIN — MICAFUNGIN SODIUM 100 MG: 50 INJECTION, POWDER, LYOPHILIZED, FOR SOLUTION INTRAVENOUS at 18:21

## 2024-10-23 RX ADMIN — SODIUM CHLORIDE SOLN NEBU 3% 4 ML: 3 NEBU SOLN at 19:35

## 2024-10-23 RX ADMIN — METOCLOPRAMIDE 10 MG: 5 INJECTION, SOLUTION INTRAMUSCULAR; INTRAVENOUS at 12:26

## 2024-10-23 RX ADMIN — ALBUTEROL SULFATE 2.5 MG: 2.5 SOLUTION RESPIRATORY (INHALATION) at 07:27

## 2024-10-23 RX ADMIN — BUDESONIDE AND FORMOTEROL FUMARATE DIHYDRATE 2 PUFF: 160; 4.5 AEROSOL RESPIRATORY (INHALATION) at 08:58

## 2024-10-23 RX ADMIN — SODIUM CHLORIDE, SODIUM GLUCONATE, SODIUM ACETATE, POTASSIUM CHLORIDE, MAGNESIUM CHLORIDE, SODIUM PHOSPHATE, DIBASIC, AND POTASSIUM PHOSPHATE 100 ML/HR: .53; .5; .37; .037; .03; .012; .00082 INJECTION, SOLUTION INTRAVENOUS at 20:38

## 2024-10-23 RX ADMIN — METOCLOPRAMIDE 10 MG: 5 INJECTION, SOLUTION INTRAMUSCULAR; INTRAVENOUS at 00:02

## 2024-10-23 RX ADMIN — DOXYCYCLINE 100 MG: 100 INJECTION, POWDER, LYOPHILIZED, FOR SOLUTION INTRAVENOUS at 12:29

## 2024-10-23 RX ADMIN — FUROSEMIDE 20 MG: 10 INJECTION, SOLUTION INTRAMUSCULAR; INTRAVENOUS at 18:36

## 2024-10-23 RX ADMIN — PANTOPRAZOLE SODIUM 40 MG: 40 INJECTION, POWDER, FOR SOLUTION INTRAVENOUS at 09:19

## 2024-10-23 RX ADMIN — SODIUM CHLORIDE SOLN NEBU 3% 4 ML: 3 NEBU SOLN at 07:27

## 2024-10-23 RX ADMIN — ALBUTEROL SULFATE 2.5 MG: 2.5 SOLUTION RESPIRATORY (INHALATION) at 19:35

## 2024-10-23 RX ADMIN — METOCLOPRAMIDE 10 MG: 5 INJECTION, SOLUTION INTRAMUSCULAR; INTRAVENOUS at 17:36

## 2024-10-23 RX ADMIN — METOCLOPRAMIDE 10 MG: 5 INJECTION, SOLUTION INTRAMUSCULAR; INTRAVENOUS at 23:36

## 2024-10-23 RX ADMIN — PANTOPRAZOLE SODIUM 40 MG: 40 INJECTION, POWDER, FOR SOLUTION INTRAVENOUS at 20:46

## 2024-10-23 RX ADMIN — ACETAMINOPHEN 1000 MG: 1000 INJECTION, SOLUTION INTRAVENOUS at 20:34

## 2024-10-23 NOTE — ASSESSMENT & PLAN NOTE
A1c of 7.5.  Creatinine appears to be close to baseline.  Suspect hypoglycemia seen on admission likely contributing to some degree of dehydration along with patient's poor oral intake.  Creatinine can impact antibiotic dosing and I suspect is contributing to elevated Pro-Seth.  Will trend Pro-Seth for now  Repeat chemistry tomorrow  Glucose management per primary

## 2024-10-23 NOTE — ASSESSMENT & PLAN NOTE
Reportedly was acutely confused on presentation.  Patient appears more appropriate on exam.  CT head without acute findings.  Sinus disease noted on imaging which I suspect is not contributing.  Monitor mental status  Low threshold for repeat imaging of the head  Antibiotic/antifungal as above  Trend fever curve/WBC

## 2024-10-23 NOTE — UTILIZATION REVIEW
"Initial Clinical Review    Admission: Date/Time/Statement:   Admission Orders (From admission, onward)       Ordered        10/22/24 0055  INPATIENT ADMISSION  Once                          Orders Placed This Encounter   Procedures    INPATIENT ADMISSION     Standing Status:   Standing     Number of Occurrences:   1     Order Specific Question:   Level of Care     Answer:   Med Surg [16]     Order Specific Question:   Estimated length of stay     Answer:   More than 2 Midnights     Order Specific Question:   Certification     Answer:   I certify that inpatient services are medically necessary for this patient for a duration of greater than two midnights. See H&P and MD Progress Notes for additional information about the patient's course of treatment.     ED Arrival Information       Expected   -    Arrival   10/21/2024 20:33    Acuity   Emergent              Means of arrival   Walk-In    Escorted by   Spouse    Service   Hospitalist    Admission type   Emergency              Arrival complaint   ams             Chief Complaint   Patient presents with    Altered Mental Status     Per , patient has been \"in and out of reality\" x 3 days. Patient not able to tell name and  during triage. Patient has been vomiting x several days       Initial Presentation: 48 y.o. 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  Patient has recurrent admissions for pneumonia and Pseudomonas/MSSA, also status post pneumonectomy following up with YAYA Mcdonnell.  She also has a severe history of GERD status post fundoplication in , anxiety, type 1 diabetes on insulin, pulmonary hypertension, CKD stage III, nephrolithiasis in  status post lithotripsy, pancreatic insufficiency on Creon, left lower extremity DVT on Eliquis who presents with " vomiting times several days. Admitted IP status w/ hematemesis w/ nausea , toxic metabolic encephalopathy . Plan for NPO , IV PPI , GI consult . If mental status does not improved obtain MRI . Hx lung transplant Continue immunosuppression medications including Prograf, atovaquone, prednisone. On prophylactic Zithromax and acyclovir.  GERD protonix . DM SSI and monitor. Chronic pain lyrica and oxycodone . Hx DVT LLE , eliquis on hold . Recheck labs in am .     Anticipated Length of Stay/Certification Statement:  Patient will be admitted on an inpatient basis with an anticipated length of stay of greater than 2 midnights secondary to hematemesis.     10/22 GI Consult   No evidence of significant hematemesis. Her nausea vomiting may be a mild gastroparesis flare or in the setting of polypharmacy given her significant altered mental status on admission. Will hold off on repeating EGD in the inpatient setting especially given no evidence of overt bleeding and normal hemoglobin.   Continue PPI twice daily and Pepcid nightly. Restart Reglan 4 times daily for gastroparesis. Continue Creon for pancreatic insufficiency.     10/22 IM Note   Pt oriented x1 to place.   Discussed case with transplant team at [facility] and the team agreeable to transfer the patient to their hospital.  Transfer d/t acute encephalopathy and N/V.     Date: 10/23   Day 2: mental status is back to baseline. Breath sound diminished . Cont supportive care . IV PPI and monitoring of labs . Required IV zofran x1 today .     10/23 GI Note   Gastroparesis, acute nausea and vomiting - Began after she was started on tetracyline for pnuemonia. CT on admission revealing esophageal wall thickening consistent with esophagitis. Tetracylines are known to cause upper GI upset. She is now on IV doxycline. Awaiting transfer to [facility] where she underwent lung transplant for history of cystic fiborsis.  Continue supportive care,  Add scoplamine patch,  PPI IV BID and Pepcid  as ordered,  Low residue diet     ED Treatment-Medication Administration from 10/21/2024 2033 to 10/22/2024 0157         Date/Time Order Dose Route Action     10/21/2024 2110 ondansetron (ZOFRAN) injection 4 mg 4 mg Intravenous Given     10/21/2024 2110 morphine injection 4 mg 4 mg Intravenous Given     10/21/2024 2110 sodium chloride 0.9 % bolus 1,000 mL 1,000 mL Intravenous New Bag     10/21/2024 2155 iohexol (OMNIPAQUE) 350 MG/ML injection (MULTI-DOSE) 100 mL 100 mL Intravenous Given     10/21/2024 2237 pantoprazole (PROTONIX) injection 40 mg 40 mg Intravenous Given     10/22/2024 0029 ceftriaxone (ROCEPHIN) 1 g/50 mL in dextrose IVPB 1,000 mg Intravenous New Bag     10/22/2024 0028 ondansetron (ZOFRAN) injection 4 mg 4 mg Intravenous Given     10/22/2024 0032 sodium chloride 0.9 % bolus 500 mL 500 mL Intravenous New Bag            Scheduled Medications:  albuterol, 2.5 mg, Nebulization, BID  apixaban, 5 mg, Oral, BID  atovaquone, 1,500 mg, Oral, Daily  azithromycin, 250 mg, Oral, Once per day on Monday Wednesday Friday  budesonide-formoterol, 2 puff, Inhalation, Q12H  doxycycline, 100 mg, Intravenous, Q12H  famotidine, 20 mg, Intravenous, Q24H DOTTY  furosemide, 40 mg, Oral, Daily  insulin glargine, 5 Units, Subcutaneous, QAM  insulin lispro, 1-5 Units, Subcutaneous, Q6H  metoclopramide, 10 mg, Intravenous, Q6H DOTTY  oxybutynin, 15 mg, Oral, HS  pantoprazole, 40 mg, Intravenous, Q12H DOTTY  predniSONE, 10 mg, Oral, Daily  sodium chloride, 4 mL, Nebulization, BID  tacrolimus, 2 mg, Oral, BID  tamsulosin, 0.4 mg, Oral, Daily With Dinner  valACYclovir, 500 mg, Oral, Daily      Continuous IV Infusions:  multi-electrolyte, 100 mL/hr, Intravenous, Continuous      PRN Meds:  ondansetron, 4 mg, Intravenous, Q6H PRN  10/22 x2  10/23 x1      ED Triage Vitals   Temperature Pulse Respirations Blood Pressure SpO2 Pain Score   10/21/24 2035 10/21/24 2035 10/21/24 2035 10/21/24 2035 10/21/24 2035 10/21/24 2110   98 °F (36.7 °C) (!)  109 18 116/69 99 % 5     Weight (last 2 days)       Date/Time Weight    10/22/24 02:03:02 51.3 (113.1)    10/21/24 2035 54.4 (120)            Vital Signs (last 3 days)       Date/Time Temp Pulse Resp BP MAP (mmHg) SpO2 O2 Device Patient Position - Orthostatic VS Batsheva Coma Scale Score Pain    10/23/24 0747 -- 92 19 -- -- 99 % -- -- -- --    10/23/24 0730 -- -- -- -- -- 100 % None (Room air) -- 14 No Pain    10/23/24 0727 -- -- -- -- -- 98 % -- -- -- --    10/23/24 07:22:59 97.9 °F (36.6 °C) 90 -- 113/59 77 97 % -- -- -- --    10/23/24 0013 -- -- -- -- -- -- -- -- 14 --    10/23/24 0007 -- -- -- -- -- -- -- -- -- No Pain    10/22/24 22:11:52 98.3 °F (36.8 °C) 90 -- 116/73 87 96 % -- -- -- --    10/22/24 1949 -- 89 20 -- -- 96 % -- -- -- --    10/22/24 1930 -- -- -- -- -- -- None (Room air) -- 14 No Pain    10/22/24 15:37:18 100 °F (37.8 °C) 95 -- 113/74 87 94 % -- -- -- --    10/22/24 0900 -- -- -- -- -- 100 % -- -- -- --    10/22/24 0830 -- -- -- -- -- 97 % -- -- 14 --    10/22/24 0737 98.2 °F (36.8 °C) 98 18 113/79 90 97 % None (Room air) Sitting -- No Pain    10/22/24 0700 -- -- -- -- -- 97 % -- -- -- --    10/22/24 0500 -- -- -- -- -- 97 % -- -- -- --    10/22/24 0349 -- -- -- -- -- -- -- -- 14 No Pain    10/22/24 02:03:02 101.7 °F (38.7 °C) 100 18 115/80 92 96 % None (Room air) Sitting -- --    10/22/24 0100 -- -- -- -- -- 97 % -- -- -- --    10/22/24 0000 -- 96 16 118/64 -- 96 % None (Room air) -- -- --    10/21/24 2200 -- 96 22 126/78 100 92 % None (Room air) -- -- --    10/21/24 2125 -- -- -- -- -- -- None (Room air) -- 14 --    10/21/24 2110 -- -- -- -- -- -- -- -- -- 5    10/21/24 2100 -- 106 22 122/80 97 95 % -- -- -- --    10/21/24 2035 98 °F (36.7 °C) 109 18 116/69 -- 99 % None (Room air) Sitting -- --              Pertinent Labs/Diagnostic Test Results:   Radiology:  MRI brain wo contrast   Final Interpretation by Houston Pryor MD (10/23 0123)      No evidence of acute infarct, acute  intracranial hemorrhage or mass.      Workstation performed: [id number]         CT head without contrast   Final Interpretation by Nishant Saucedo MD (10/22 0805)      Small extra-axial hyperdensity at the anterior aspect of the left middle cranial fossa. Possible adjacent osseous remodeling. Favor small meningioma with hemorrhage felt less likely. Recommend short interval follow-up CT versus characterization with    brain MRI.            This represents a change from the preliminary report.   I personally discussed this study with Anna Coats on 10/22/2024 8:04 AM.            Workstation performed: [id number]         CT chest abdomen pelvis w contrast   Final Interpretation by Nishant Saucedo MD (10/22 0718)      1.  Diffuse esophageal wall thickening and mucosal hyperenhancement suggests esophagitis.   2.  Rectosigmoid colonic wall prominence without inflammation may be due to underdistention but may be correlated for evidence of colitis. Constipation.   3.  Ancillary findings as above.            Findings in agreement with preliminary report.      Workstation performed: [id number]         XR chest 1 view portable   Final Interpretation by Howie Kemp MD (10/22 0819)   Stable postoperative change after right pneumonectomy with complete opacification right hemithorax and rightward mediastinal shift. Stable appearance to the transplanted left lung.         Workstation performed: [id number]           Cardiology:  No orders to display     GI:  No orders to display       Results from last 7 days   Lab Units 10/21/24  2107   SARS-COV-2  Negative     Results from last 7 days   Lab Units 10/22/24  0423 10/21/24  2102   WBC Thousand/uL 16.95* 20.92*   HEMOGLOBIN g/dL 11.8 12.9   HEMATOCRIT % 39.0 42.7   PLATELETS Thousands/uL 121* 142*   TOTAL NEUT ABS Thousands/µL  --  10.69*         Results from last 7 days   Lab Units 10/22/24  0423 10/21/24  2102   SODIUM mmol/L 140 134*   POTASSIUM mmol/L 4.0 3.9   CHLORIDE mmol/L  109* 99   CO2 mmol/L 25 27   ANION GAP mmol/L 6 8   BUN mg/dL 57* 68*   CREATININE mg/dL 1.21 1.14   EGFR ml/min/1.73sq m 53 57   CALCIUM mg/dL 7.5* 8.4   MAGNESIUM mg/dL 1.5*  --      Results from last 7 days   Lab Units 10/22/24  0423 10/21/24  2102   AST U/L 12* 14   ALT U/L 15 19   ALK PHOS U/L 63 65   TOTAL PROTEIN g/dL 5.8* 6.4   ALBUMIN g/dL 3.0* 3.3*   TOTAL BILIRUBIN mg/dL 0.47 0.72     Results from last 7 days   Lab Units 10/23/24  1146 10/23/24  0350 10/22/24  2244 10/22/24  1535 10/22/24  1046 10/22/24  0749 10/22/24  0051 10/21/24  2048   POC GLUCOSE mg/dl 181* 125 200* 138 164* 214* 292* 406*     Results from last 7 days   Lab Units 10/22/24  0423 10/21/24  2102   GLUCOSE RANDOM mg/dL 261* 448*         Beta- Hydroxybutyrate   Date Value Ref Range Status   10/21/2024 0.27 0.02 - 0.27 mmol/L Final   05/21/2024 0.06 0.02 - 0.27 mmol/L Final          Results from last 7 days   Lab Units 10/21/24  2102   PH LORI  7.453*   PCO2 LORI mm Hg 34.5*   PO2 LORI mm Hg 34.2*   HCO3 LORI mmol/L 23.6*   BASE EXC LORI mmol/L 0.2   O2 CONTENT LORI ml/dL 12.2   O2 HGB, VENOUS % 63.7       Results from last 7 days   Lab Units 10/22/24  0104 10/21/24  2357 10/21/24  2102   HS TNI 0HR ng/L  --   --  17   HS TNI 2HR ng/L  --  20  --    HSTNI D2 ng/L  --  3  --    HS TNI 4HR ng/L 23  --   --    HSTNI D4 ng/L 6  --   --        Results from last 7 days   Lab Units 10/21/24  2102   PROCALCITONIN ng/ml 0.37*     Results from last 7 days   Lab Units 10/21/24  2102   LACTIC ACID mmol/L 1.9         Results from last 7 days   Lab Units 10/21/24  2102   LIPASE u/L <6*       Results from last 7 days   Lab Units 10/21/24  2352   CLARITY UA  Clear   COLOR UA  Light Yellow   SPEC GRAV UA  1.046*   PH UA  6.0   GLUCOSE UA mg/dl 1000 (1%)*   KETONES UA mg/dl Negative   BLOOD UA  Negative   PROTEIN UA mg/dl Negative   NITRITE UA  Negative   BILIRUBIN UA  Negative   UROBILINOGEN UA (BE) mg/dl <2.0   LEUKOCYTES UA  Negative     Results from last 7  days   Lab Units 10/21/24  2107   INFLUENZA A PCR  Negative   INFLUENZA B PCR  Negative   RSV PCR  Negative         Results from last 7 days   Lab Units 10/22/24  1018   AMPH/METH  Negative   BARBITURATE UR  Negative   BENZODIAZEPINE UR  Negative   COCAINE UR  Negative   METHADONE URINE  Negative   OPIATE UR  Positive*   PCP UR  Negative   THC UR  Negative       Results from last 7 days   Lab Units 10/22/24  0106 10/22/24  0104   BLOOD CULTURE  No Growth at 24 hrs.  --    GRAM STAIN RESULT   --  Gram positive cocci in clusters*         Past Medical History:   Diagnosis Date    Abnormal uterine bleeding (AUB)     ABPA (allergic bronchopulmonary aspergillosis) ([facility]) 07/08/2012    Acute kidney injury ([facility]) 09/08/2017    Acute on chronic respiratory failure ([facility]) 03/04/2017    Last Assessment & Plan:  Formatting of this note might be different from the original. Recurrent with clinical worsening with enlarging PTX, s/p multiple chest tubes, but new fevers 6/19 and worsening leukocytosis indicating infection as cause of 6/19 decompensation with profound hypoxia in setting of shunting through right lung and continued pneumothorax s/p 2nd chest tube placement.  6/27: Right    Anaphylaxis 06/06/2022    Anemia     Asthma     Chest pain 09/21/2021    Chronic pain     Chronic respiratory failure with hypoxia ([facility]) 09/07/2021    COVID-19 06/03/2022    Cystic fibrosis ([facility])     Deep vein thrombosis ([facility])     Diabetes mellitus ([facility])     Fever 09/22/2024    GERD (gastroesophageal reflux disease)     HBP (high blood pressure)     HCAP (healthcare-associated pneumonia) 03/06/2020    History of transfusion     Influenza B 02/23/2020    Kidney stone     Moderate protein-calorie malnutrition (HCC) 01/08/2022    Neutropenic fever  ([facility]) 09/26/2020    Pneumonia due to Pseudomonas species ([facility]) 10/28/2010    Last Assessment & Plan:  Formatting of this note might be different from the original. Polymicrobial pna w/ pseudomonas, M.  Abscessus, Cadida glabrata/albicans    PONV (postoperative nausea and vomiting)     Pseudomonas aeruginosa infection 09/20/2012    Renal calculus     Squamous cell skin cancer 01/12/2023    SCCIS- Left upper thigh    Transplant recipient     Lung- 2013, 2017    Ulcerative colitis (HCC)     Wellness examination 02/04/2013    Formatting of this note might be different from the original. Discussed with patient and acknowledgement form signed on 2/4/2013 (ALYSHA)     Present on Admission:   Hematemesis with nausea   Toxic metabolic encephalopathy   GERD (gastroesophageal reflux disease)   Type 1 diabetes mellitus with diabetic chronic kidney disease, unspecified CKD stage (HCC)   Pancreatic insufficiency   Chronic pain disorder   Nausea and vomiting   Gastroparesis   Positive culture findings in sputum      Admitting Diagnosis: Altered mental status [R41.82]  Esophagitis [K20.90]  Upper GI bleed [K92.2]  AMS (altered mental status) [R41.82]  Age/Sex: 48 y.o. female    Network Utilization Review Department  ATTENTION: Please call with any questions or concerns to [phone] and carefully listen to the prompts so that you are directed to the right person. All voicemails are confidential.   For Discharge needs, contact Care Management DC Support Team at [phone] opt. [phone]  Send all requests for admission clinical reviews, approved or denied determinations and any other requests to dedicated fax number below belonging to the campus where the patient is receiving treatment. List of dedicated fax numbers for the Facilities:  FACILITY NAME UR FAX NUMBER   ADMISSION DENIALS (Administrative/Medical Necessity) [phone]   DISCHARGE SUPPORT TEAM (NETWORK) [phone]   PARENT CHILD HEALTH (Maternity/NICU/Pediatrics) [phone]   [facility] [phone]   [facility] [phone]   [facility] [phone]   [facility]  [phone]   [facility] [phone]   [facility] [phone]   [facility] [phone]   [facility] [phone]   [facility] [phone]   [facility] [phone]   [facility] [phone]   [facility] [phone]

## 2024-10-23 NOTE — TELEPHONE ENCOUNTER
----- Message from Apryl Smallwood PA-C sent at 10/23/2024  1:16 PM EDT -----  Edwin Min!!    This patient was supposed to have a EGD with [facility] with Dr. Ko.  She is awaiting transfer to [facility], but she is hoping to have her test rescheduled soon as possible when she returns home.  Would you be able to keep an eye on on this.  Thank you so so much

## 2024-10-23 NOTE — ASSESSMENT & PLAN NOTE
Patient was seen by transplant provider on 10/10 and sputum cultures later isolated MRSA.  Had been started on minocycline given symptoms at the time.  Course now complicated by the above.  CT imaging without any acute infiltrate/parenchymal disease.  Case was discussed with transplant center by primary service and patient was recommended for IV doxycycline.  Will continue IV doxycycline for now  Recommend review of imaging with transplant center  Trend fever curve/vitals  Repeat CBC/chemistry tomorrow  Monitor for symptom improvement of the above  Anticipate short course/likely rapid discontinuation based on CT imaging

## 2024-10-23 NOTE — PROGRESS NOTES
Progress Note - Hospitalist   Name: Maximilian Mann 48 y.o. female I MRN: [id number]  Unit/Bed#: -01 I Date of Admission: 10/21/2024   Date of Service: 10/23/2024 I Hospital Day: 1    Assessment & Plan  Nausea and vomiting  Patient with nausea and vomiting and questionable hematemesis.  CT scan showing diffuse esophageal thickening and mucosal suggesting esophagitis.  GI team consulted and recommending medical management of nausea and vomiting.  Suspected gastroparesis in the setting of polypharmacy and altered mental status.    Plan:  Case discussed with infectious disease team, esophagitis suspected to be in the setting of minocycline versus candida esophagitis  Recommended scheduled Reglan and outpatient EGD.  Will continue with PPI every 12 hours and Pepcid  Started micafungin IV for possible Candida esophagitis  Toxic metabolic encephalopathy  Unclear, will need to get more history from the family.  Patient identifies that she is confused and answers yes.  I anticipate is due to polypharmacy with her having Xanax, oxycodone, Lyrica and also Marinol. Those medications been discontinued  Questionable if supratherapeutic tacrolimus level pending tacrolimus blood work.  CT head showing Small extra-axial hyperdensity at the anterior aspect of the left middle cranial fossa. Possible adjacent osseous remodeling. Favor small meningioma with hemorrhage felt less likely.  MRI brain with no acute findings  Plan:  Slight improvement in mental status.  Continue IV fluids given poor oral intake.  Pending transfer to [facility], lung transplant department.    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  Has had recurrent admissions for pneumonia and Pseudomonas/MSSA and status post pneumonectomy    Given  concern that patient  has nausea and vomiting questionable if the patient was able to keep down the medication the past few days..  Patient was recently started minocycline for MRSA airway infection.  Per patient's  the symptoms started after patient started minocycline.  Switched to IV doxycycline  Discussed case with transplant team at [facility] and the team agreeable to transfer the patient to their hospital.  Tacrolimus level 12.  Attempted to reach out to [facility] lung transplant team however was able to leave only voicemail. Will reach out again tomorrow.    GERD (gastroesophageal reflux disease)  History of severe GERD status post fundoplication in 2023  Noted esophagitis on CT suspected in the setting of pill esophagitis versus Candida esophagitis  Protonix for milligrams IV every 12 hours  Ordered famotidine IV  Type 1 diabetes mellitus with diabetic chronic kidney disease, unspecified CKD stage (HCC)  I am not clear what her home regimen is at this time.  That will need to be clarified  From the records it looks like she takes glargine 12 units in the morning, and NovoLog 10 each with breakfast, 8 units with lunch and 12 with dinner.    Will do glargine 5 units in the morning, insulin sliding scale every 6 hours as she is NPO.  Pancreatic insufficiency  Continue Creon when tolerating diet  Chronic pain disorder  Is on Lyrica and oxycodone which we will hold for encephalopathy and confusion  History of DVT (deep vein thrombosis)  History of left lower extremity DVT on Eliquis.  Resume Eliquis given no concern for bleeding.  Gastroparesis  Started Reglan  Hematemesis with nausea  Patient with nausea and hematemesis    N.p.o.  Protonix 40 mg IV every 12 hours  GI consultation  Discontinued Eliquis and other none emergent medications at this time  Positive culture findings in sputum  Patient found to have MRSA in her sputum per lung transplant team.  Was started on minocycline therapy however patient  developed symptoms.  Currently on IV doxycycline.  Will discuss with transplant team tomorrow for further management.  Positive blood culture  1 out of 2 culture growing Staph epidermidis,  Infectious disease involved and likely contaminant.  No changes in the antibiotic and continue following  SIRS (systemic inflammatory response syndrome) (HCC)      VTE Pharmacologic Prophylaxis: VTE Score: 4 Moderate Risk (Score 3-4) - Pharmacological DVT Prophylaxis Ordered: enoxaparin (Lovenox).    Mobility:   Basic Mobility Inpatient Raw Score: 20  JH-HLM Goal: 6: Walk 10 steps or more  JH-HLM Achieved: 7: Walk 25 feet or more  JH-HLM Goal achieved. Continue to encourage appropriate mobility.    Patient Centered Rounds: I performed bedside rounds with nursing staff today.   Discussions with Specialists or Other Care Team Provider: Discussed case with infectious disease    Education and Discussions with Family / Patient: Updated  () via phone.    Current Length of Stay: 1 day(s)  Current Patient Status: Inpatient   Certification Statement: The patient will continue to require additional inpatient hospital stay due to altered mental status, nausea and vomiting  Discharge Plan: Anticipate discharge in 24-48 hrs to home.    Code Status: Level 1 - Full Code    Subjective   Patient more alert however refusing to answer my orientation question.    Objective :  Temp:  [97.9 °F (36.6 °C)-98.3 °F (36.8 °C)] 98.3 °F (36.8 °C)  HR:  [79-92] 79  BP: (113-116)/(58-73) 113/58  Resp:  [19-20] 19  SpO2:  [96 %-100 %] 96 %  O2 Device: None (Room air)    Body mass index is 20.69 kg/m².     Input and Output Summary (last 24 hours):     Intake/Output Summary (Last 24 hours) at 10/23/2024 1816  Last data filed at 10/23/2024 1035  Gross per 24 hour   Intake 360 ml   Output 420 ml   Net -60 ml       Physical Exam  Vitals and nursing note reviewed.   Constitutional:       General: She is not in acute distress.     Appearance: She  is well-developed.   HENT:      Head: Normocephalic and atraumatic.   Eyes:      Conjunctiva/sclera: Conjunctivae normal.   Cardiovascular:      Rate and Rhythm: Normal rate and regular rhythm.      Heart sounds: No murmur heard.  Pulmonary:      Effort: Pulmonary effort is normal. No respiratory distress.      Breath sounds: Normal breath sounds. No wheezing or rales.   Abdominal:      Palpations: Abdomen is soft.      Tenderness: There is no abdominal tenderness.   Musculoskeletal:         General: No swelling.      Cervical back: Neck supple.      Right lower leg: No edema.      Left lower leg: No edema.   Skin:     General: Skin is warm and dry.      Capillary Refill: Capillary refill takes less than 2 seconds.   Neurological:      Mental Status: She is alert. She is disoriented.   Psychiatric:         Mood and Affect: Mood normal.           Lines/Drains:  Lines/Drains/Airways       Active Status       Name Placement date Placement time Site Days    Port A Cath 11/25/22 Right Chest 11/25/22  1201  Chest  698                    Central Line:  Goal for removal:  Chronic port               Lab Results: I have reviewed the following results:   Results from last 7 days   Lab Units 10/22/24  0423 10/21/24  2102   WBC Thousand/uL 16.95* 20.92*   HEMOGLOBIN g/dL 11.8 12.9   HEMATOCRIT % 39.0 42.7   PLATELETS Thousands/uL 121* 142*   SEGS PCT %  --  52   LYMPHO PCT %  --  36   MONO PCT %  --  11   EOS PCT %  --  1     Results from last 7 days   Lab Units 10/22/24  0423   SODIUM mmol/L 140   POTASSIUM mmol/L 4.0   CHLORIDE mmol/L 109*   CO2 mmol/L 25   BUN mg/dL 57*   CREATININE mg/dL 1.21   ANION GAP mmol/L 6   CALCIUM mg/dL 7.5*   ALBUMIN g/dL 3.0*   TOTAL BILIRUBIN mg/dL 0.47   ALK PHOS U/L 63   ALT U/L 15   AST U/L 12*   GLUCOSE RANDOM mg/dL 261*         Results from last 7 days   Lab Units 10/23/24  1651 10/23/24  1146 10/23/24  0350 10/22/24  2244 10/22/24  1535 10/22/24  1046 10/22/24  0749 10/22/24  0051  10/21/24  2048   POC GLUCOSE mg/dl 153* 181* 125 200* 138 164* 214* 292* 406*         Results from last 7 days   Lab Units 10/21/24  2102   LACTIC ACID mmol/L 1.9   PROCALCITONIN ng/ml 0.37*       Recent Cultures (last 7 days):   Results from last 7 days   Lab Units 10/22/24  0106 10/22/24  0104   BLOOD CULTURE  No Growth at 24 hrs.  --    GRAM STAIN RESULT   --  Gram positive cocci in clusters*       Imaging Results Review: I reviewed radiology reports from this admission including: MRI brain.  Other Study Results Review: No additional pertinent studies reviewed.    Last 24 Hours Medication List:     Current Facility-Administered Medications:     albuterol inhalation solution 2.5 mg, BID    atovaquone (MEPRON) oral suspension 1,500 mg, Daily    [START ON 10/25/2024] azithromycin (ZITHROMAX) 500 mg in sodium chloride 0.9 % 250 mL IVPB, Once    budesonide-formoterol (SYMBICORT) 160-4.5 mcg/act inhaler 2 puff, Q12H    doxycycline (VIBRAMYCIN) 100 mg in sodium chloride 0.9 % 100 mL IVPB, Q12H, Last Rate: 100 mg (10/23/24 1229)    enoxaparin (LOVENOX) subcutaneous injection 50 mg, Q12H DOTTY    Famotidine (PF) (PEPCID) injection 20 mg, Q24H DOTTY    furosemide (LASIX) injection 20 mg, Daily    insulin glargine (LANTUS) subcutaneous injection 5 Units 0.05 mL, QAM    insulin lispro (HumALOG/ADMELOG) 100 units/mL subcutaneous injection 1-5 Units, Q6H **AND** Fingerstick Glucose (POCT), Q6H    metoclopramide (REGLAN) injection 10 mg, Q6H DOTTY    micafungin (MYCAMINE) 100 mg in sodium chloride 0.9 % 100 mL IVPB, Q24H    multi-electrolyte (PLASMALYTE-A/ISOLYTE-S PH 7.4) IV solution, Continuous, Last Rate: 100 mL/hr (10/23/24 0114)    ondansetron (ZOFRAN) injection 4 mg, Q6H PRN    oxybutynin (DITROPAN-XL) 24 hr tablet 15 mg, HS    pantoprazole (PROTONIX) injection 40 mg, Q12H DOTTY    predniSONE tablet 10 mg, Daily    scopolamine (TRANSDERM-SCOP) 1 mg/3 days TD 72 hr patch 1 patch, Q72H    sodium chloride 3 % inhalation solution 4  mL, BID    tacrolimus (PROGRAF) capsule 2 mg, BID    tamsulosin (FLOMAX) capsule 0.4 mg, Daily With Dinner    [START ON 10/24/2024] valACYclovir (VALTREX) tablet 500 mg, Daily        **Please Note: This note may have been constructed using a voice recognition system.**

## 2024-10-23 NOTE — RESPIRATORY THERAPY NOTE
RT Protocol Note  Maximilian RAY Sourav Mann 48 y.o. female MRN: [id number]  Unit/Bed#: -01 Encounter: [id number]    Assessment    Principal Problem:    Nausea and vomiting  Active Problems:    Type 1 diabetes mellitus with diabetic chronic kidney disease, unspecified CKD stage ([facility])    Pancreatic insufficiency    Chronic pain disorder    GERD (gastroesophageal reflux disease)    Gastroparesis    Hx of lung transplant ([facility])    History of DVT (deep vein thrombosis)    Hematemesis with nausea    Toxic metabolic encephalopathy    Positive culture findings in sputum    Positive blood culture    SIRS (systemic inflammatory response syndrome) ([facility])      Home Pulmonary Medications:     10/23/24 1935   Respiratory Protocol   Protocol Initiated? No   Protocol Selection Respiratory   Language Barrier? No   Medical & Social History Reviewed? Yes   Diagnostic Studies Reviewed? Yes   Physical Assessment Performed? Yes   Respiratory Plan Home Bronchodilator Patient pathway   Respiratory Assessment   Assessment Type During-treatment   General Appearance Alert;Awake   Respiratory Pattern Normal   Chest Assessment Chest expansion symmetrical   Bilateral Breath Sounds Diminished   Cough None   Resp Comments Will continue with current tx plan   O2 Device RA   Cough Description   Sputum Amount None   Additional Assessments   Pulse 80   Respirations 20   SpO2 97 %            Past Medical History:   Diagnosis Date    Abnormal uterine bleeding (AUB)     ABPA (allergic bronchopulmonary aspergillosis) ([facility]) 07/08/2012    Acute kidney injury ([facility]) 09/08/2017    Acute on chronic respiratory failure ([facility]) 03/04/2017    Last Assessment & Plan:  Formatting of this note might be different from the original. Recurrent with clinical worsening with enlarging PTX, s/p multiple chest tubes, but new fevers 6/19 and worsening leukocytosis indicating infection as cause of 6/19 decompensation with profound hypoxia in setting of shunting through right  lung and continued pneumothorax s/p 2nd chest tube placement.  : Right    Anaphylaxis 2022    Anemia     Asthma     Chest pain 2021    Chronic pain     Chronic respiratory failure with hypoxia (HCC) 2021    COVID-19 2022    Cystic fibrosis (HCC)     Deep vein thrombosis (HCC)     Diabetes mellitus (HCC)     Fever 2024    GERD (gastroesophageal reflux disease)     HBP (high blood pressure)     HCAP (healthcare-associated pneumonia) 2020    History of transfusion     Influenza B 2020    Kidney stone     Moderate protein-calorie malnutrition (HCC) 2022    Neutropenic fever  ([facility]) 2020    Pneumonia due to Pseudomonas species ([facility]) 10/28/2010    Last Assessment & Plan:  Formatting of this note might be different from the original. Polymicrobial pna w/ pseudomonas, M. Abscessus, Cadida glabrata/albicans    PONV (postoperative nausea and vomiting)     Pseudomonas aeruginosa infection 2012    Renal calculus     Squamous cell skin cancer 2023    SCCIS- Left upper thigh    Transplant recipient     Lung- 2017    Ulcerative colitis ([facility])     Wellness examination 2013    Formatting of this note might be different from the original. Discussed with patient and acknowledgement form signed on 2013 (ALYSHA)     Social History     Socioeconomic History    Marital status: /Civil Union     Spouse name: None    Number of children: None    Years of education: 12    Highest education level: None   Occupational History    Occupation: Disabled    Tobacco Use    Smoking status: Former     Current packs/day: 0.00     Average packs/day: 0.5 packs/day for 12.0 years (6.0 ttl pk-yrs)     Types: Cigarettes     Start date: 1993     Quit date: 2005     Years since quittin.8     Passive exposure: Past    Smokeless tobacco: Never   Vaping Use    Vaping status: Never Used   Substance and Sexual Activity    Alcohol use: Yes     Alcohol/week: 2.0 standard  drinks of alcohol     Types: 2 Glasses of wine per week     Comment: social    Drug use: Yes     Frequency: 2.0 times per week     Types: Marijuana     Comment: medical edible marijuana prescribed    Sexual activity: Yes     Partners: Male     Birth control/protection: None   Other Topics Concern    None   Social History Narrative    Most recent tobacco use screenin2019    Do you currently or have you served in the [employer]: No    Were you activated, into active duty, as a member of the [employer] or as a Reservist: No    Occupation: disabled    Education: 12    Marital status:     Exercise level: Occasional    Diet: Specific    low sugar, high fat    General stress level: High    Alcohol intake: Occasional    Caffeine intake: Heavy    Chewing tobacco: none    Illicit drugs: none    Guns present in home: No    Seat belts used routinely: Yes    Smoke alarm in home: Yes    Advance directive: Yes     Social Determinants of Health     Financial Resource Strain: Low Risk  (11/10/2022)    [facility], [facility]    Overall Financial Resource Strain (CARDIA)     Difficulty of Paying Living Expenses: Not hard at all   Food Insecurity: No Food Insecurity (2024)    Nursing - Inadequate Food Risk Classification     Worried About Running Out of Food in the Last Year: Never true     Ran Out of Food in the Last Year: Never true     Ran Out of Food in the Last Year: Not on file   Transportation Needs: No Transportation Needs (2024)    PRAPARE - Transportation     Lack of Transportation (Medical): No     Lack of Transportation (Non-Medical): No   Physical Activity: Insufficiently Active (1/15/2024)    Exercise Vital Sign     Days of Exercise per Week: 7 days     Minutes of Exercise per Session: 20 min   Stress: No Stress Concern Present (1/15/2024)    [employer] - Occupational Stress  "Questionnaire     Feeling of Stress : Only a little   Social Connections: Unknown (6/18/2024)    Received from [employer]    [employer]     How often do you feel lonely or isolated from those around you? (Adult - for ages 18 years and over): Not on file   Intimate Partner Violence: Not At Risk (1/15/2024)    Humiliation, Afraid, Rape, and Kick questionnaire     Fear of Current or Ex-Partner: No     Emotionally Abused: No     Physically Abused: No     Sexually Abused: No   Housing Stability: Low Risk  (9/28/2024)    Housing Stability Vital Sign     Unable to Pay for Housing in the Last Year: No     Number of Times Moved in the Last Year: 1     Homeless in the Last Year: No       Subjective         Objective    Physical Exam:   Assessment Type: During-treatment  General Appearance: Alert, Awake  Respiratory Pattern: Normal  Chest Assessment: Chest expansion symmetrical  Bilateral Breath Sounds: Diminished  Cough: None  O2 Device: RA    Vitals:  Blood pressure 113/58, pulse 80, temperature 98.3 °F (36.8 °C), resp. rate 20, height 5' 2\" (1.575 m), weight 51.3 kg (113 lb 1.5 oz), SpO2 97%, not currently breastfeeding.          Imaging and other studies: Results Review Statement: No pertinent imaging studies reviewed.    O2 Device: RA     Plan    Respiratory Plan: Home Bronchodilator Patient pathway        Resp Comments: Will continue with current tx plan   "

## 2024-10-23 NOTE — ASSESSMENT & PLAN NOTE
Patient has underlying gastroparesis, GERD which has been poorly controlled recently.  She had previous fundoplication, recent upper GI series has a sliding hiatal hernia.  He was most recently seen by outpatient provider and started on minocycline given upper respiratory symptoms and MRSA on sputum culture.  Symptoms began after starting medication.  He is now off the medication with slow improvement in symptoms.  Suspect patient developed primary pill esophagitis.  Consider candidal esophagitis.  Lower suspicion for CMV/HSV.  Although not typical could consider GVHD.  Will continue on IV doxycycline per transplant center  No further oral minocycline  Will also start on micafungin to avoid drug interactions  Will monitor for ongoing symptom improvement  Trend fever curve/vitals  Repeat CBC/chemistry tomorrow  GI evaluation appreciated  Additional supportive care as per primary  Additional interventions pending clinical course/symptom improvement

## 2024-10-23 NOTE — ASSESSMENT & PLAN NOTE
1 out of 2 culture growing Staph epidermidis,  Infectious disease involved and likely contaminant.  No changes in the antibiotic and continue following

## 2024-10-23 NOTE — ASSESSMENT & PLAN NOTE
Patient previously underwent double lung transplant in 2013.  She required retransplant of the left lung in 2017.  Course was then complicated by need for right-sided pneumonectomy in 2022 given chronic progressive infections.  She remains on regimen with tacrolimus and prednisone.  Needs to follow with chronic providers.  Prophylactic medications and dosing reviewed.  Continue chronic atovaquone, Valtrex and azithromycin  Continue immunosuppressive therapy otherwise  Monitor closely for any drug drug interactions  Close follow-up with transplant center

## 2024-10-23 NOTE — ASSESSMENT & PLAN NOTE
History of severe GERD status post fundoplication in 2023  Noted esophagitis on CT suspected in the setting of pill esophagitis versus Candida esophagitis  Protonix for milligrams IV every 12 hours  Ordered famotidine IV

## 2024-10-23 NOTE — ASSESSMENT & PLAN NOTE
1 out of 2 blood cultures from admission isolated coagulase-negative staph.  Uncertain where this was collected from, if this was an earlier IV or possibly her port.  Peripheral blood culture however without any growth.  Suspect contaminant/colonization.  Maintain current port  Follow-up pending blood culture  No plans for repeat culture given other sources above  Trend fever curve/vitals

## 2024-10-23 NOTE — ASSESSMENT & PLAN NOTE
Unclear, will need to get more history from the family.  Patient identifies that she is confused and answers yes.  I anticipate is due to polypharmacy with her having Xanax, oxycodone, Lyrica and also Marinol. Those medications been discontinued  Questionable if supratherapeutic tacrolimus level pending tacrolimus blood work.  CT head showing Small extra-axial hyperdensity at the anterior aspect of the left middle cranial fossa. Possible adjacent osseous remodeling. Favor small meningioma with hemorrhage felt less likely.  MRI brain with no acute findings  Plan:  Slight improvement in mental status.  Continue IV fluids given poor oral intake.  Pending transfer to . [place], lung transplant department.

## 2024-10-23 NOTE — ASSESSMENT & PLAN NOTE
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  Has had recurrent admissions for pneumonia and Pseudomonas/MSSA and status post pneumonectomy    Given concern that patient  has nausea and vomiting questionable if the patient was able to keep down the medication the past few days..  Patient was recently started minocycline for MRSA airway infection.  Per patient's  the symptoms started after patient started minocycline.  Switched to IV doxycycline  Discussed case with transplant team at [facility] and the team agreeable to transfer the patient to their hospital.  Tacrolimus level 12.  Attempted to reach out to you plan lung transplant team however was able to leave only voicemail. Will reach out again tomorrow.

## 2024-10-23 NOTE — ASSESSMENT & PLAN NOTE
Known at baseline.  Suspect that this may be further contributing to the above.  Will continue IV therapies for now.  GI evaluation appreciated.

## 2024-10-23 NOTE — ASSESSMENT & PLAN NOTE
Patient found to have MRSA in her sputum per lung transplant team.  Was started on minocycline therapy however patient developed symptoms.  Currently on IV doxycycline.  Will discuss with transplant team tomorrow for further management.

## 2024-10-23 NOTE — PROGRESS NOTES
"Progress Note - Maximilian FLOR Mann 48 y.o. female MRN: [id number]    Unit/Bed#: -01 Encounter: [id number]        Assessment and Plan:     1) Gastroparesis, acute nausea and vomiting - Began after she was started on tetracyline for pnuemonia. CT on admission revealing esophageal wall thickening consistent with esophagitis. Tetracylines are known to cause upper GI upset. She is now on IV doxycline. 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   - Continue supportive care  - Add scoplamine patch  - PPI IV BID and Pepcid as ordered  - Low residue diet   - Messaged our group to reschedule her EGD with BRAVO as soon as she returns back to the area. Primary GI is Dr. Ko   - GI will sign off, patient awaiting transfer to [facility]     2) GERD - See plan above.    3) Pancreatic insufficiency, related to cystic fibosis - Continue home dose of Creon.       Subjective:     Patient's  states her mental status is much improved since admission. She had vomited dark appearing material, but no further episodes. She is worried about having her BRAVO rescheduled as well.     ROS: As noted in the HPI, otherwise all others negative.    Objective:     Vitals: Blood pressure 113/59, pulse 92, temperature 97.9 °F (36.6 °C), resp. rate 19, height 5' 2\" (1.575 m), weight 51.3 kg (113 lb 1.5 oz), SpO2 99%, not currently breastfeeding.,Body mass index is 20.69 kg/m².      Intake/Output Summary (Last 24 hours) at 10/23/2024 1321  Last data filed at 10/23/2024 1035  Gross per 24 hour   Intake 360 ml   Output 420 ml   Net -60 ml       Physical Exam:     General Appearance: Alert and oriented x 3. In no respiratory distress  Lungs: Clear to auscultation bilaterally, no rales or rhonchi  Heart: Regular rate and rhythm, S1, S2 normal, no murmur, click, rub or gallop  Abdomen: Soft, non-tender, non-distended; bowel sounds normal; no masses or no organomegaly  Extremities: No " cyanosis, edema    Invasive Devices       Central Venous Catheter Line  Duration             Port A Cath 11/25/22 Right Chest 698 days    Port A Cath 11/25/22 Right Chest 698 days                    Lab Results:  Results from last 7 days   Lab Units 10/22/24  0423 10/21/24  2102   WBC Thousand/uL 16.95* 20.92*   HEMOGLOBIN g/dL 11.8 12.9   HEMATOCRIT % 39.0 42.7   PLATELETS Thousands/uL 121* 142*   SEGS PCT %  --  52   LYMPHO PCT %  --  36   MONO PCT %  --  11   EOS PCT %  --  1     Results from last 7 days   Lab Units 10/22/24  0423   POTASSIUM mmol/L 4.0   CHLORIDE mmol/L 109*   CO2 mmol/L 25   BUN mg/dL 57*   CREATININE mg/dL 1.21   CALCIUM mg/dL 7.5*   ALK PHOS U/L 63   ALT U/L 15   AST U/L 12*         Results from last 7 days   Lab Units 10/21/24  2102   LIPASE u/L <6*       Imaging Studies: I have personally reviewed pertinent imaging studies.    MRI brain wo contrast    Result Date: 10/23/2024  Impression: No evidence of acute infarct, acute intracranial hemorrhage or mass. Workstation performed: [id number]     XR chest 1 view portable    Result Date: 10/22/2024  Impression: Stable postoperative change after right pneumonectomy with complete opacification right hemithorax and rightward mediastinal shift. Stable appearance to the transplanted left lung. Workstation performed: [id number]     CT head without contrast    Result Date: 10/22/2024  Impression: Small extra-axial hyperdensity at the anterior aspect of the left middle cranial fossa. Possible adjacent osseous remodeling. Favor small meningioma with hemorrhage felt less likely. Recommend short interval follow-up CT versus characterization with brain MRI. This represents a change from the preliminary report. I personally discussed this study with Anna Coats on 10/22/2024 8:04 AM. Workstation performed: [id number]     CT chest abdomen pelvis w contrast    Result Date: 10/22/2024  Impression: 1.  Diffuse esophageal wall thickening and mucosal  "hyperenhancement suggests esophagitis. 2.  Rectosigmoid colonic wall prominence without inflammation may be due to underdistention but may be correlated for evidence of colitis. Constipation. 3.  Ancillary findings as above. Findings in agreement with preliminary report. Workstation performed: [id number]           Portions of the record may have been created with voice recognition software.  Occasional wrong word or \"sound a like\" substitutions may have occurred due to the inherent limitations of voice recognition software.  Read the chart carefully and recognize, using context, where substitutions have occurred.    "

## 2024-10-23 NOTE — ASSESSMENT & PLAN NOTE
Patient with nausea and vomiting and questionable hematemesis.  CT scan showing diffuse esophageal thickening and mucosal suggesting esophagitis.  GI team consulted and recommending medical management of nausea and vomiting.  Suspected gastroparesis in the setting of polypharmacy and altered mental status.    Plan:  Case discussed with infectious disease team, esophagitis suspected to be in the setting of minocycline versus candida esophagitis  Recommended scheduled Reglan and outpatient EGD.  Will continue with PPI every 12 hours and Pepcid  Started micafungin IV for possible Candida esophagitis

## 2024-10-23 NOTE — RESPIRATORY THERAPY NOTE
"RT Protocol Note  Maximilian RAY Sourav Mann 48 y.o. female MRN: [id number]  Unit/Bed#: -01 Encounter: [id number]    Assessment    Principal Problem:    Nausea and vomiting  Active Problems:    Type 1 diabetes mellitus with diabetic chronic kidney disease, unspecified CKD stage (HCC)    Pancreatic insufficiency    Chronic pain disorder    GERD (gastroesophageal reflux disease)    Gastroparesis    Hx of lung transplant (HCC)    History of DVT (deep vein thrombosis)    Hematemesis with nausea    Toxic metabolic encephalopathy  Physical Exam:   Assessment Type: Post-treatment  General Appearance: Awake, Alert  Respiratory Pattern: Normal  Chest Assessment: Chest expansion symmetrical  Bilateral Breath Sounds: Diminished  O2 Device: ra    Vitals:  Blood pressure 113/59, pulse 92, temperature 97.9 °F (36.6 °C), resp. rate 19, height 5' 2\" (1.575 m), weight 51.3 kg (113 lb 1.5 oz), SpO2 99%, not currently breastfeeding.      O2 Device: ra     Plan    Respiratory Plan: Home Bronchodilator Patient pathway        Resp Comments: will cont w current txs as per home regimen   "

## 2024-10-23 NOTE — UTILIZATION REVIEW
NOTIFICATION OF INPATIENT ADMISSION   AUTHORIZATION REQUEST   SERVICING FACILITY:   [place] [address]  Tax ID: [id number]  NPI: [id number] ATTENDING PROVIDER:  Attending Name and NPI#: Anna Coats Md [[id number]]  Address: [address] [place] [address]  Phone: [phone]     ADMISSION INFORMATION:  Place of Service: Inpatient [facility]  Place of Service Code: 21  Inpatient Admission Date/Time: 10/22/24 12:55 AM  Discharge Date/Time: No discharge date for patient encounter.  Admitting Diagnosis Code/Description:  Altered mental status [R41.82]  Esophagitis [K20.90]  Upper GI bleed [K92.2]  AMS (altered mental status) [R41.82]     UTILIZATION REVIEW CONTACT:  Tata Moreno, Utilization   Network Utilization Review Department  Phone: [phone]  Fax [phone]  Email: [email or web address]  Contact for approvals/pending authorizations, clinical reviews, and discharge.     PHYSICIAN ADVISORY SERVICES:  Medical Necessity Denial & Mrfa-yl-Qxpk Review  Phone: [phone]  Fax: [phone]  Email: [email or web address]     DISCHARGE SUPPORT TEAM:  For Patients Discharge Needs & Updates  Phone: [phone] opt. [phone] Fax: [phone]  Email: [email or web address]

## 2024-10-23 NOTE — ASSESSMENT & PLAN NOTE
Noted to have poorly controlled disease at baseline.  Recent upper GI series as above.  No plans for EGD at this time per GI.  Will continue with care as above and monitor for symptom improvement.

## 2024-10-23 NOTE — ASSESSMENT & PLAN NOTE
Patient was noted with tachycardia, leukocytosis and isolated episode of fever.  I suspect that this is related primarily to her GI symptoms with nausea/retching and likely a component of dehydration.  Suspect blood cultures below represent contaminant/colonization of her port pin.  CT imaging of the chest/abdomen/pelvis otherwise unremarkable.  CT of the head notable for sinus congestion which may be due to ongoing episodes of reflux and her recent consistent vomiting.  Patient offers otherwise no other localizing complaints.  She is pending transfer to her transplant center.  Clinical parameters slowly improving along with patient's symptoms and mental status.  Pro-Seth only mildly elevated in the setting of CKD.  Antibiotic/Antifungal as below  Trend fever curve/vitals  Repeat CBC/chemistry tomorrow for treatment response/dosing  Follow-up second set of blood culture  Will check respiratory viral panel for completeness  Will trend Pro-Seth for now  Will continue prophylactic medications as below  GI evaluation noted  If patient fails to improve may need EGD on this admission  Will hold off on CMV PCR given recent IgG positivity and improving symptoms  Continue discussion of case with transplant center  Additional supportive cares per primary  Additional interventions pending clinical course

## 2024-10-23 NOTE — CONSULTS
Consultation - Infectious Disease   Name: Maximilian Mann 48 y.o. female I MRN: [id number]  Unit/Bed#: -01 I Date of Admission: 10/21/2024   Date of Service: 10/23/2024 I Hospital Day: 1   Inpatient consult to Infectious Diseases  Consult performed by: Tammy Ko MD  Consult ordered by: Anna Coats MD        Physician Requesting Evaluation: Anna Coats MD   Reason for Evaluation / Principal Problem: Positive blood culture and lung transplant      Assessment & Plan  SIRS (systemic inflammatory response syndrome) (HCC)  Patient was noted with tachycardia, leukocytosis and isolated episode of fever.  I suspect that this is related primarily to her GI symptoms with nausea/retching and likely a component of dehydration.  Suspect blood cultures below represent contaminant/colonization of her port pin.  CT imaging of the chest/abdomen/pelvis otherwise unremarkable.  CT of the head notable for sinus congestion which may be due to ongoing episodes of reflux and her recent consistent vomiting.  Patient offers otherwise no other localizing complaints.  She is pending transfer to her transplant center.  Clinical parameters slowly improving along with patient's symptoms and mental status.  Pro-Seth only mildly elevated in the setting of CKD.  Antibiotic/Antifungal as below  Trend fever curve/vitals  Repeat CBC/chemistry tomorrow for treatment response/dosing  Follow-up second set of blood culture  Will check respiratory viral panel for completeness  Will trend Pro-Seth for now  Will continue prophylactic medications as below  GI evaluation noted  If patient fails to improve may need EGD on this admission  Will hold off on CMV PCR given recent IgG positivity and improving symptoms  Continue discussion of case with transplant center  Additional supportive cares per primary  Additional interventions pending clinical course    Nausea and vomiting  Patient has underlying gastroparesis, GERD which has been  poorly controlled recently.  She had previous fundoplication, recent upper GI series has a sliding hiatal hernia.  He was most recently seen by outpatient provider and started on minocycline given upper respiratory symptoms and MRSA on sputum culture.  Symptoms began after starting medication.  He is now off the medication with slow improvement in symptoms.  Suspect patient developed primary pill esophagitis.  Consider candidal esophagitis.  Lower suspicion for CMV/HSV.  Although not typical could consider GVHD.  Will continue on IV doxycycline per transplant center  No further oral minocycline  Will also start on micafungin to avoid drug interactions  Will monitor for ongoing symptom improvement  Trend fever curve/vitals  Repeat CBC/chemistry tomorrow  GI evaluation appreciated  Additional supportive care as per primary  Additional interventions pending clinical course/symptom improvement  Toxic metabolic encephalopathy  Reportedly was acutely confused on presentation.  Patient appears more appropriate on exam.  CT head without acute findings.  Sinus disease noted on imaging which I suspect is not contributing.  Monitor mental status  Low threshold for repeat imaging of the head  Antibiotic/antifungal as above  Trend fever curve/WBC  Positive culture findings in sputum  Patient was seen by transplant provider on 10/10 and sputum cultures later isolated MRSA.  Had been started on minocycline given symptoms at the time.  Course now complicated by the above.  CT imaging without any acute infiltrate/parenchymal disease.  Case was discussed with transplant center by primary service and patient was recommended for IV doxycycline.  Will continue IV doxycycline for now  Recommend review of imaging with transplant center  Trend fever curve/vitals  Repeat CBC/chemistry tomorrow  Monitor for symptom improvement of the above  Anticipate short course/likely rapid discontinuation based on CT imaging  Gastroparesis  Known at  baseline.  Suspect that this may be further contributing to the above.  Will continue IV therapies for now.  GI evaluation appreciated.  GERD (gastroesophageal reflux disease)  Noted to have poorly controlled disease at baseline.  Recent upper GI series as above.  No plans for EGD at this time per GI.  Will continue with care as above and monitor for symptom improvement.    Positive blood culture  1 out of 2 blood cultures from admission isolated coagulase-negative staph.  Uncertain where this was collected from, if this was an earlier IV or possibly her port.  Peripheral blood culture however without any growth.  Suspect contaminant/colonization.  Maintain current port  Follow-up pending blood culture  No plans for repeat culture given other sources above  Trend fever curve/vitals  Hx of lung transplant ([facility])  Patient previously underwent double lung transplant in 2013.  She required retransplant of the left lung in 2017.  Course was then complicated by need for right-sided pneumonectomy in 2022 given chronic progressive infections.  She remains on regimen with tacrolimus and prednisone.  Needs to follow with chronic providers.  Prophylactic medications and dosing reviewed.  Continue chronic atovaquone, Valtrex and azithromycin  Continue immunosuppressive therapy otherwise  Monitor closely for any drug drug interactions  Close follow-up with transplant center  Type 1 diabetes mellitus with diabetic chronic kidney disease, unspecified CKD stage ([facility])  A1c of 7.5.  Creatinine appears to be close to baseline.  Suspect hypoglycemia seen on admission likely contributing to some degree of dehydration along with patient's poor oral intake.  Creatinine can impact antibiotic dosing and I suspect is contributing to elevated Pro-Seth.  Will trend Pro-Seth for now  Repeat chemistry tomorrow  Glucose management per primary      Above plan discussed in detail with the patient at bedside  Above plan discussed in detail with primary  service attending who is aware of plans for antibiotic, addition of antifungal and additional testing with close monitoring pending transfer    ID consult service will continue to follow.    HISTORY OF PRESENT ILLNESS:  HPI: Maximilian Mann is a 48 y.o. 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  She also had complicated recurrent pulmonary infections including MRSA and Pseudomonas pneumonia as well as NTM infection eventually requiring right sided pneumonectomy in October 2022.  Patient has pancreatic insufficiency, diabetes, CKD.  She is chronically on atovaquone, azithromycin and Valtrex and is followed by [facility].  In the past patient had been on posaconazole prophylaxis along with CMV prophylaxis and recent outpatient transplant note mentions CMV IgG positivity.  Patient was most recently seen by our service in September.  At that time she was seen for obstructing renal calculus leading to pyelonephritis.  She was treated with antibiotics as well as antifungal.  Since that time she was seen by her transplant provider on 10/10.  At the time there was concern for possible developing upper respiratory symptoms with some increased sputum production and congestion along with fatigue.  GERD symptoms had remained significant and she was pending further esophageal pH and manometry.  Outpatient medication list reviewed at that visit.  Allergies to antibiotics also noted.  Recent CMV PCR in August had been negative.  Respiratory viral panel at that visit was negative.  She was being tapered down steroid dose at the time at 15 mg/day.  Given her previous sputum cultures and symptoms additional sputum cultures were ordered and patient was started on minocycline.  She reports that after starting the  minocycline in a few days she started to get GI symptoms with nausea and vomiting.  She lost count having time she was vomiting each day.  She noted a mix of bile and black material.  She delayed coming into the hospital she did not wish to be in the hospital.  He denied any episodes of rectal bleeding or melanotic stools.  She has chronic issues with constipation as well.  On presentation patient had an episode of fever along with leukocytosis.  Antibiotics ordered or started.  She was also confused at the time.  Notes mention mental status is overall improved since admission.  She was seen by GI.  No plans for scope at this time noted.  Emergency room this reportedly discussed case with transplant service and patient is being considered for transfer at this point.  After discussion with their team she was started on doxycycline IV.  Fever curve has since downtrended.  Patient reporting still having coughing and dry heaving and acid coming up into her chest but this seems to be improving compared to prior.  1 of 2 blood cultures isolated Staph epidermidis and patient self reports being difficult to access.  She has had no issues with her port recently.  COVID and flu testing were negative.  Imaging reviewed below.  Consulted at this time for further assistance with management of patient's immunosuppressed status.    REVIEW OF SYSTEMS:  A complete 12 point system-based review of systems is negative other than that noted in the HPI.    PAST MEDICAL HISTORY:  Past Medical History:   Diagnosis Date    Abnormal uterine bleeding (AUB)     ABPA (allergic bronchopulmonary aspergillosis) ([facility]) 07/08/2012    Acute kidney injury ([facility]) 09/08/2017    Acute on chronic respiratory failure ([facility]) 03/04/2017    Last Assessment & Plan:  Formatting of this note might be different from the original. Recurrent with clinical worsening with enlarging PTX, s/p multiple chest tubes, but new fevers 6/19 and worsening leukocytosis indicating  infection as cause of 6/19 decompensation with profound hypoxia in setting of shunting through right lung and continued pneumothorax s/p 2nd chest tube placement.  6/27: Right    Anaphylaxis 06/06/2022    Anemia     Asthma     Chest pain 09/21/2021    Chronic pain     Chronic respiratory failure with hypoxia (HCC) 09/07/2021    COVID-19 06/03/2022    Cystic fibrosis (HCC)     Deep vein thrombosis (HCC)     Diabetes mellitus (HCC)     Fever 09/22/2024    GERD (gastroesophageal reflux disease)     HBP (high blood pressure)     HCAP (healthcare-associated pneumonia) 03/06/2020    History of transfusion     Influenza B 02/23/2020    Kidney stone     Moderate protein-calorie malnutrition (HCC) 01/08/2022    Neutropenic fever  (HCC) 09/26/2020    Pneumonia due to Pseudomonas species (HCC) 10/28/2010    Last Assessment & Plan:  Formatting of this note might be different from the original. Polymicrobial pna w/ pseudomonas, M. Abscessus, Cadida glabrata/albicans    PONV (postoperative nausea and vomiting)     Pseudomonas aeruginosa infection 09/20/2012    Renal calculus     Squamous cell skin cancer 01/12/2023    SCCIS- Left upper thigh    Transplant recipient     Lung- 2013, 2017    Ulcerative colitis (HCC)     Wellness examination 02/04/2013    Formatting of this note might be different from the original. Discussed with patient and acknowledgement form signed on 2/4/2013 (ALYSHA)     Past Surgical History:   Procedure Laterality Date    APPENDECTOMY      AUGMENTATION MAMMAPLASTY Bilateral 2004    AUGMENTATION MAMMAPLASTY Right 2021    removed     BREAST SURGERY      Aug.     BRONCHOSCOPY      CHOLECYSTECTOMY      COLONOSCOPY      EGD  07/2019    [facility]     FL RETROGRADE PYELOGRAM  01/09/2020    FL RETROGRADE PYELOGRAM  02/04/2020    FL RETROGRADE PYELOGRAM  9/20/2024    GASTROSCOPY  03/01/2024    GASTROSTOMY TUBE, PLACE      IR BALLOON-OCCLUDED ANTEGRADE TRANSVENOUS OBLITERATION (BATO)  11/22/2022    IR PORT PLACEMENT   2022    IR PORT REMOVAL  01/10/2022    IR PORT STRIPPING  2022    IR TUNNELED CENTRAL LINE PLACEMENT  2022    LUNG SURGERY      LUNG TRANSPLANT      X2    IN CYSTO BLADDER W/URETERAL CATHETERIZATION Right 2020    Procedure: CYSTOSCOPY RETROGRADE PYELOGRAM WITH INSERTION STENT URETERAL;  Surgeon: Patricio Perry MD;  Location: [facility];  Service: Urology    IN CYSTO/URETERO W/LITHOTRIPSY &INDWELL STENT INSRT Right 2020    Procedure: CYSTOSCOPY URETEROSCOPY WITH LITHOTRIPSY HOLMIUM LASER, RETROGRADE PYELOGRAM AND INSERTION STENT URETERAL;  Surgeon: Patricio Perry MD;  Location: [facility];  Service: Urology    IN CYSTO/URETERO W/LITHOTRIPSY &INDWELL STENT INSRT Left 2024    Procedure: CYSTOSCOPY URETEROSCOPY WITH LITHOTRIPSY HOLMIUM LASER, RETROGRADE PYELOGRAM AND INSERTION STENT URETERAL, BASKET STONE EXTRACTION;  Surgeon: Lee Briceno MD;  Location: [facility];  Service: Urology    SKIN BIOPSY      US GUIDED VASCULAR ACCESS  2017       FAMILY HISTORY:  Non-contributory    SOCIAL HISTORY:  Social History   Social History     Substance and Sexual Activity   Alcohol Use Yes    Alcohol/week: 2.0 standard drinks of alcohol    Types: 2 Glasses of wine per week    Comment: social     Social History     Substance and Sexual Activity   Drug Use Yes    Frequency: 2.0 times per week    Types: Marijuana    Comment: medical edible marijuana prescribed     Social History     Tobacco Use   Smoking Status Former    Current packs/day: 0.00    Average packs/day: 0.5 packs/day for 12.0 years (6.0 ttl pk-yrs)    Types: Cigarettes    Start date: 1993    Quit date: 2005    Years since quittin.8    Passive exposure: Past   Smokeless Tobacco Never       ALLERGIES:  Allergies   Allergen Reactions    Bebtelovimab Anaphylaxis    Vancomycin Angioedema and Hives    Gabapentin Hallucinations    Ceftazidime Headache     Severe headaches after desensitization    Nsaids Other (See  Comments)     Lung transplant increases risk of renal toxicity, call lung txp provider to discuss if needed       MEDICATIONS:  All current active medications have been reviewed.    PHYSICAL EXAM:  Temp:  [97.9 °F (36.6 °C)-100 °F (37.8 °C)] 97.9 °F (36.6 °C)  HR:  [89-95] 92  Resp:  [19-20] 19  BP: (113-116)/(59-74) 113/59  SpO2:  [94 %-100 %] 99 %  Temp (24hrs), Av.7 °F (37.1 °C), Min:97.9 °F (36.6 °C), Max:100 °F (37.8 °C)  Current: Temperature: 97.9 °F (36.6 °C)    Intake/Output Summary (Last 24 hours) at 10/23/2024 1526  Last data filed at 10/23/2024 1035  Gross per 24 hour   Intake 360 ml   Output 420 ml   Net -60 ml       General Appearance:  Chronically ill-appearing, nontoxic.  She is able to answer questions but is intermittently having episodes of dry heaves.   Head:  Normocephalic, without obvious abnormality, atraumatic   Eyes:  Conjunctiva pink and sclera anicteric, both eyes   Nose: Nares normal, mucosa normal, no drainage   Throat: Oropharynx moist without lesions   Neck: Supple, symmetrical, no adenopathy, no tenderness/mass/nodules   Back:   Symmetric, no curvature, ROM normal, no CVA tenderness   Lungs:   Clear breath sounds on the left, no wheezes or rales.   Chest Wall:  No tenderness or deformity port site grossly unremarkable.  Currently accessed.   Heart:  RRR; no murmur, rub or gallop noted   Abdomen:   Soft, non-tender, non-distended, positive bowel sounds    Extremities: No cyanosis, clubbing or edema   Skin: No rashes or lesions. No draining wounds noted.   Lymph nodes: Cervical, supraclavicular nodes normal   Neurologic: Alert and oriented times 3, extremity strength 5/5 and symmetric       LABS, IMAGING, & OTHER STUDIES:  In completing this consult I have performed an extensive review of the medical records in epic including review of the notes, radiographs, and laboratory results as detailed below.     Lab Results:  I have personally reviewed pertinent  labs.  Comments/Interpretations: White blood cell count downtrending.  Platelets intermittently low and fluctuating at baseline.  Creatinine 1.2.    Results from last 7 days   Lab Units 10/22/24  0423 10/21/24  2102   WBC Thousand/uL 16.95* 20.92*   HEMOGLOBIN g/dL 11.8 12.9   PLATELETS Thousands/uL 121* 142*     Results from last 7 days   Lab Units 10/22/24  0423 10/21/24  2102   POTASSIUM mmol/L 4.0 3.9   CHLORIDE mmol/L 109* 99   CO2 mmol/L 25 27   BUN mg/dL 57* 68*   CREATININE mg/dL 1.21 1.14   EGFR ml/min/1.73sq m 53 57   CALCIUM mg/dL 7.5* 8.4   AST U/L 12* 14   ALT U/L 15 19   ALK PHOS U/L 63 65     Results from last 7 days   Lab Units 10/22/24  0106 10/22/24  0104   BLOOD CULTURE  No Growth at 24 hrs.  --    GRAM STAIN RESULT   --  Gram positive cocci in clusters*       Imaging Studies:   I have personally reviewed pertinent imaging study reports and images in PACS.  Comments/Interpretations:  MRI of the brain without acute findings  CT of the head with some sinus opacification no acute findings  CT imaging of the chest/abdomen/pelvis with contrast reviewed which shows diffuse esophageal wall thickening and enhancement concerning for esophagitis.  The lung itself is without any acute infiltrates only some small nodules noted.  Upper GI series with sliding hiatal hernia    Other Studies:   I have personally reviewed other pertinent reports as below.  Records in CareEverywhere: Recent transplant notes reviewed    Nursing home/EMS Records: None    Current/Prior Cultures: Suspect current blood culture represent contaminant.  Prior culture data reviewed.

## 2024-10-23 NOTE — PLAN OF CARE
Problem: METABOLIC, FLUID AND ELECTROLYTES - ADULT  Goal: Electrolytes maintained within normal limits  Description: INTERVENTIONS:  - Monitor labs and assess patient for signs and symptoms of electrolyte imbalances  - Administer electrolyte replacement as ordered  - Monitor response to electrolyte replacements, including repeat lab results as appropriate  - Instruct patient on fluid and nutrition as appropriate  Outcome: Progressing  Goal: Fluid balance maintained  Description: INTERVENTIONS:  - Monitor labs   - Monitor I/O and WT  - Instruct patient on fluid and nutrition as appropriate  - Assess for signs & symptoms of volume excess or deficit  Outcome: Progressing  Goal: Glucose maintained within target range  Description: INTERVENTIONS:  - Monitor Blood Glucose as ordered  - Assess for signs and symptoms of hyperglycemia and hypoglycemia  - Administer ordered medications to maintain glucose within target range  - Assess nutritional intake and initiate nutrition service referral as needed  Outcome: Progressing     Problem: Alteration in Orientation  Goal: Treatment Goal: Demonstrate a reduction of confusion and improved orientation to person, place, time and/or situation upon discharge, according to optimum baseline/ability  Outcome: Progressing  Goal: Interact with staff daily  Description: Interventions:  - Assess and re-assess patient's level of orientation  - Engage patient in 1 on 1 interactions, daily, for a minimum of 15 minutes   - Establish rapport/trust with patient   Outcome: Progressing  Goal: Express concerns related to confused thinking related to:  Description: Interventions:  - Encourage patient to express feelings, fears, frustrations, hopes  - Assign consistent caregivers   - [place]/re-orient patient as needed  - Allow comfort items, as appropriate  - Provide visual cues, signs, etc.   Outcome: Progressing  Goal: Allow medical examinations, as recommended  Description: Interventions:  -  Provide physical/neurological exams and/or referrals, per provider   Outcome: Progressing  Goal: Cooperate with recommended testing/procedures  Description: Interventions:  - Determine need for ancillary testing  - Observe for mental status changes  - Implement falls/precaution protocol   Outcome: Progressing  Goal: Attend and participate in unit activities, including therapeutic, recreational, and educational groups  Description: Interventions:  - Provide therapeutic and educational activities daily, encourage attendance and participation, and document same in the medical record   - Provide appropriate opportunities for reminiscence   - Provide a consistent daily routine   - Encourage family contact/visitation   Outcome: Progressing  Goal: Complete daily ADLs, including personal hygiene independently, as able  Description: Interventions:  - Observe, teach, and assist patient with ADLS  Outcome: Progressing     Problem: SAFETY ADULT  Goal: Patient will remain free of falls  Description: INTERVENTIONS:  - Educate patient/family on patient safety including physical limitations  - Instruct patient to call for assistance with activity   - Consult OT/PT to assist with strengthening/mobility   - Keep Call bell within reach  - Keep bed low and locked with side rails adjusted as appropriate  - Keep care items and personal belongings within reach  - Initiate and maintain comfort rounds  - Make Fall Risk Sign visible to staff  - Offer Toileting every 2 Hours, in advance of need  - Initiate/Maintain bed alarm  - Apply yellow socks and bracelet for high fall risk patients  - Consider moving patient to room near nurses station  Outcome: Progressing  Goal: Maintain or return to baseline ADL function  Description: INTERVENTIONS:  -  Assess patient's ability to carry out ADLs; assess patient's baseline for ADL function and identify physical deficits which impact ability to perform ADLs (bathing, care of mouth/teeth, toileting,  grooming, dressing, etc.)  - Assess/evaluate cause of self-care deficits   - Assess range of motion  - Assess patient's mobility; develop plan if impaired  - Assess patient's need for assistive devices and provide as appropriate  - Encourage maximum independence but intervene and supervise when necessary  - Involve family in performance of ADLs  - Assess for home care needs following discharge   - Consider OT consult to assist with ADL evaluation and planning for discharge  - Provide patient education as appropriate  Outcome: Progressing  Goal: Maintains/Returns to pre admission functional level  Description: INTERVENTIONS:  - Perform AM-PAC 6 Click Basic Mobility/ Daily Activity assessment daily.  - Set and communicate daily mobility goal to care team and patient/family/caregiver.   - Collaborate with rehabilitation services on mobility goals if consulted  - Perform Range of Motion 3 times a day.  - Reposition patient every 2 hours.  - Dangle patient 3 times a day  - Stand patient 3 times a day  - Ambulate patient 3 times a day  - Out of bed to chair 3 times a day   - Out of bed for meals 3 times a day  - Out of bed for toileting  - Record patient progress and toleration of activity level   Outcome: Progressing

## 2024-10-24 DIAGNOSIS — K21.00 GASTROESOPHAGEAL REFLUX DISEASE WITH ESOPHAGITIS WITHOUT HEMORRHAGE: ICD-10-CM

## 2024-10-24 PROBLEM — E87.6 HYPOKALEMIA: Status: ACTIVE | Noted: 2024-10-24

## 2024-10-24 PROBLEM — D61.818 PANCYTOPENIA, ACQUIRED (HCC): Status: ACTIVE | Noted: 2024-10-24

## 2024-10-24 LAB
ANION GAP SERPL CALCULATED.3IONS-SCNC: 6 MMOL/L (ref 4–13)
ATRIAL RATE: 106 BPM
B PARAP IS1001 DNA NPH QL NAA+NON-PROBE: NOT DETECTED
B PERT.PT PRMT NPH QL NAA+NON-PROBE: NOT DETECTED
BASOPHILS # BLD AUTO: 0.01 THOUSANDS/ΜL (ref 0–0.1)
BASOPHILS NFR BLD AUTO: 0 % (ref 0–1)
BUN SERPL-MCNC: 15 MG/DL (ref 5–25)
C PNEUM DNA NPH QL NAA+NON-PROBE: NOT DETECTED
CALCIUM SERPL-MCNC: 7.4 MG/DL (ref 8.4–10.2)
CHLORIDE SERPL-SCNC: 105 MMOL/L (ref 96–108)
CO2 SERPL-SCNC: 28 MMOL/L (ref 21–32)
CREAT SERPL-MCNC: 1.05 MG/DL (ref 0.6–1.3)
EOSINOPHIL # BLD AUTO: 0.15 THOUSAND/ΜL (ref 0–0.61)
EOSINOPHIL NFR BLD AUTO: 5 % (ref 0–6)
ERYTHROCYTE [DISTWIDTH] IN BLOOD BY AUTOMATED COUNT: 14.2 % (ref 11.6–15.1)
FLUAV RNA NPH QL NAA+NON-PROBE: NOT DETECTED
FLUBV RNA NPH QL NAA+NON-PROBE: NOT DETECTED
GFR SERPL CREATININE-BSD FRML MDRD: 63 ML/MIN/1.73SQ M
GLUCOSE SERPL-MCNC: 108 MG/DL (ref 65–140)
GLUCOSE SERPL-MCNC: 113 MG/DL (ref 65–140)
GLUCOSE SERPL-MCNC: 148 MG/DL (ref 65–140)
GLUCOSE SERPL-MCNC: 263 MG/DL (ref 65–140)
HADV DNA NPH QL NAA+NON-PROBE: NOT DETECTED
HCOV 229E RNA NPH QL NAA+NON-PROBE: NOT DETECTED
HCOV HKU1 RNA NPH QL NAA+NON-PROBE: NOT DETECTED
HCOV NL63 RNA NPH QL NAA+NON-PROBE: NOT DETECTED
HCOV OC43 RNA NPH QL NAA+NON-PROBE: NOT DETECTED
HCT VFR BLD AUTO: 25.8 % (ref 34.8–46.1)
HGB BLD-MCNC: 7.7 G/DL (ref 11.5–15.4)
HMPV RNA NPH QL NAA+NON-PROBE: NOT DETECTED
HPIV1 RNA NPH QL NAA+NON-PROBE: NOT DETECTED
HPIV2 RNA NPH QL NAA+NON-PROBE: NOT DETECTED
HPIV3 RNA NPH QL NAA+NON-PROBE: NOT DETECTED
HPIV4 RNA NPH QL NAA+NON-PROBE: NOT DETECTED
IMM GRANULOCYTES # BLD AUTO: 0.01 THOUSAND/UL (ref 0–0.2)
IMM GRANULOCYTES NFR BLD AUTO: 0 % (ref 0–2)
LYMPHOCYTES # BLD AUTO: 1.3 THOUSANDS/ΜL (ref 0.6–4.47)
LYMPHOCYTES NFR BLD AUTO: 40 % (ref 14–44)
M PNEUMO DNA NPH QL NAA+NON-PROBE: NOT DETECTED
MAGNESIUM SERPL-MCNC: 1.4 MG/DL (ref 1.9–2.7)
MCH RBC QN AUTO: 26.9 PG (ref 26.8–34.3)
MCHC RBC AUTO-ENTMCNC: 29.8 G/DL (ref 31.4–37.4)
MCV RBC AUTO: 90 FL (ref 82–98)
MONOCYTES # BLD AUTO: 0.32 THOUSAND/ΜL (ref 0.17–1.22)
MONOCYTES NFR BLD AUTO: 10 % (ref 4–12)
NEUTROPHILS # BLD AUTO: 1.5 THOUSANDS/ΜL (ref 1.85–7.62)
NEUTS SEG NFR BLD AUTO: 45 % (ref 43–75)
NRBC BLD AUTO-RTO: 0 /100 WBCS
P AXIS: 63 DEGREES
PHOSPHATE SERPL-MCNC: 2.7 MG/DL (ref 2.7–4.5)
PLATELET # BLD AUTO: 71 THOUSANDS/UL (ref 149–390)
PLATELET BLD QL SMEAR: ABNORMAL
PMV BLD AUTO: 12.2 FL (ref 8.9–12.7)
POTASSIUM SERPL-SCNC: 2.9 MMOL/L (ref 3.5–5.3)
POTASSIUM SERPL-SCNC: 4.9 MMOL/L (ref 3.5–5.3)
PR INTERVAL: 130 MS
PROCALCITONIN SERPL-MCNC: 0.35 NG/ML
QRS AXIS: 72 DEGREES
QRSD INTERVAL: 72 MS
QT INTERVAL: 340 MS
QTC INTERVAL: 451 MS
RBC # BLD AUTO: 2.86 MILLION/UL (ref 3.81–5.12)
RBC MORPH BLD: NORMAL
RSV RNA NPH QL NAA+NON-PROBE: NOT DETECTED
RV+EV RNA NPH QL NAA+NON-PROBE: NOT DETECTED
SARS-COV-2 RNA NPH QL NAA+NON-PROBE: NOT DETECTED
SODIUM SERPL-SCNC: 139 MMOL/L (ref 135–147)
T WAVE AXIS: 72 DEGREES
TACROLIMUS BLD-MCNC: 2.6 NG/ML (ref 3–15)
VENTRICULAR RATE: 106 BPM
WBC # BLD AUTO: 3.29 THOUSAND/UL (ref 4.31–10.16)

## 2024-10-24 PROCEDURE — 82948 REAGENT STRIP/BLOOD GLUCOSE: CPT

## 2024-10-24 PROCEDURE — 94640 AIRWAY INHALATION TREATMENT: CPT

## 2024-10-24 PROCEDURE — 99232 SBSQ HOSP IP/OBS MODERATE 35: CPT

## 2024-10-24 PROCEDURE — 93010 ELECTROCARDIOGRAM REPORT: CPT | Performed by: INTERNAL MEDICINE

## 2024-10-24 PROCEDURE — 94760 N-INVAS EAR/PLS OXIMETRY 1: CPT

## 2024-10-24 PROCEDURE — 85025 COMPLETE CBC W/AUTO DIFF WBC: CPT

## 2024-10-24 PROCEDURE — 83735 ASSAY OF MAGNESIUM: CPT

## 2024-10-24 PROCEDURE — 84145 PROCALCITONIN (PCT): CPT | Performed by: INTERNAL MEDICINE

## 2024-10-24 PROCEDURE — 84100 ASSAY OF PHOSPHORUS: CPT

## 2024-10-24 PROCEDURE — 80197 ASSAY OF TACROLIMUS: CPT

## 2024-10-24 PROCEDURE — 99233 SBSQ HOSP IP/OBS HIGH 50: CPT | Performed by: INTERNAL MEDICINE

## 2024-10-24 PROCEDURE — 93005 ELECTROCARDIOGRAM TRACING: CPT

## 2024-10-24 PROCEDURE — 80048 BASIC METABOLIC PNL TOTAL CA: CPT

## 2024-10-24 PROCEDURE — 84132 ASSAY OF SERUM POTASSIUM: CPT

## 2024-10-24 RX ORDER — TACROLIMUS 0.5 MG/1
1.5 CAPSULE ORAL 2 TIMES DAILY
Status: DISCONTINUED | OUTPATIENT
Start: 2024-10-24 | End: 2024-10-25

## 2024-10-24 RX ORDER — POTASSIUM CHLORIDE 14.9 MG/ML
20 INJECTION INTRAVENOUS
Status: DISPENSED | OUTPATIENT
Start: 2024-10-24 | End: 2024-10-24

## 2024-10-24 RX ORDER — OXYCODONE HYDROCHLORIDE 10 MG/1
10 TABLET ORAL EVERY 8 HOURS PRN
Status: DISCONTINUED | OUTPATIENT
Start: 2024-10-24 | End: 2024-10-26 | Stop reason: HOSPADM

## 2024-10-24 RX ORDER — POTASSIUM CHLORIDE 14.9 MG/ML
20 INJECTION INTRAVENOUS
Status: COMPLETED | OUTPATIENT
Start: 2024-10-24 | End: 2024-10-25

## 2024-10-24 RX ORDER — METHYLPREDNISOLONE SODIUM SUCCINATE 40 MG/ML
12 INJECTION, POWDER, LYOPHILIZED, FOR SOLUTION INTRAMUSCULAR; INTRAVENOUS DAILY
Status: DISCONTINUED | OUTPATIENT
Start: 2024-10-24 | End: 2024-10-25

## 2024-10-24 RX ORDER — MAGNESIUM SULFATE HEPTAHYDRATE 40 MG/ML
2 INJECTION, SOLUTION INTRAVENOUS ONCE
Status: COMPLETED | OUTPATIENT
Start: 2024-10-24 | End: 2024-10-24

## 2024-10-24 RX ORDER — POTASSIUM CHLORIDE 20MEQ/15ML
40 LIQUID (ML) ORAL 2 TIMES DAILY
Status: DISCONTINUED | OUTPATIENT
Start: 2024-10-24 | End: 2024-10-24

## 2024-10-24 RX ORDER — FAMOTIDINE 40 MG/1
40 TABLET, FILM COATED ORAL
Qty: 90 TABLET | Refills: 1 | Status: SHIPPED | OUTPATIENT
Start: 2024-10-24

## 2024-10-24 RX ADMIN — PANTOPRAZOLE SODIUM 40 MG: 40 INJECTION, POWDER, FOR SOLUTION INTRAVENOUS at 10:24

## 2024-10-24 RX ADMIN — METOCLOPRAMIDE 10 MG: 5 INJECTION, SOLUTION INTRAMUSCULAR; INTRAVENOUS at 13:30

## 2024-10-24 RX ADMIN — FAMOTIDINE 20 MG: 10 INJECTION, SOLUTION INTRAVENOUS at 21:42

## 2024-10-24 RX ADMIN — PANTOPRAZOLE SODIUM 40 MG: 40 INJECTION, POWDER, FOR SOLUTION INTRAVENOUS at 21:42

## 2024-10-24 RX ADMIN — FUROSEMIDE 20 MG: 10 INJECTION, SOLUTION INTRAMUSCULAR; INTRAVENOUS at 10:25

## 2024-10-24 RX ADMIN — TACROLIMUS 1.5 MG: 0.5 CAPSULE ORAL at 11:01

## 2024-10-24 RX ADMIN — METOCLOPRAMIDE 10 MG: 5 INJECTION, SOLUTION INTRAMUSCULAR; INTRAVENOUS at 07:00

## 2024-10-24 RX ADMIN — DOXYCYCLINE 100 MG: 100 INJECTION, POWDER, LYOPHILIZED, FOR SOLUTION INTRAVENOUS at 00:30

## 2024-10-24 RX ADMIN — ENOXAPARIN SODIUM 50 MG: 60 INJECTION SUBCUTANEOUS at 21:42

## 2024-10-24 RX ADMIN — SODIUM CHLORIDE, SODIUM GLUCONATE, SODIUM ACETATE, POTASSIUM CHLORIDE, MAGNESIUM CHLORIDE, SODIUM PHOSPHATE, DIBASIC, AND POTASSIUM PHOSPHATE 100 ML/HR: .53; .5; .37; .037; .03; .012; .00082 INJECTION, SOLUTION INTRAVENOUS at 06:52

## 2024-10-24 RX ADMIN — OXYCODONE HYDROCHLORIDE 10 MG: 10 TABLET ORAL at 22:26

## 2024-10-24 RX ADMIN — ALBUTEROL SULFATE 2.5 MG: 2.5 SOLUTION RESPIRATORY (INHALATION) at 20:04

## 2024-10-24 RX ADMIN — ATOVAQUONE 1500 MG: 750 SUSPENSION ORAL at 11:13

## 2024-10-24 RX ADMIN — BUDESONIDE AND FORMOTEROL FUMARATE DIHYDRATE 2 PUFF: 160; 4.5 AEROSOL RESPIRATORY (INHALATION) at 17:27

## 2024-10-24 RX ADMIN — OXYCODONE HYDROCHLORIDE 10 MG: 10 TABLET ORAL at 11:20

## 2024-10-24 RX ADMIN — SODIUM CHLORIDE SOLN NEBU 3% 4 ML: 3 NEBU SOLN at 20:04

## 2024-10-24 RX ADMIN — SODIUM CHLORIDE SOLN NEBU 3% 4 ML: 3 NEBU SOLN at 07:03

## 2024-10-24 RX ADMIN — POTASSIUM CHLORIDE 20 MEQ: 200 INJECTION, SOLUTION INTRAVENOUS at 16:04

## 2024-10-24 RX ADMIN — OXYBUTYNIN CHLORIDE 10 MG: 5 TABLET, EXTENDED RELEASE ORAL at 21:41

## 2024-10-24 RX ADMIN — ENOXAPARIN SODIUM 50 MG: 60 INJECTION SUBCUTANEOUS at 11:01

## 2024-10-24 RX ADMIN — METHYLPREDNISOLONE SODIUM SUCCINATE 12 MG: 40 INJECTION, POWDER, FOR SOLUTION INTRAMUSCULAR; INTRAVENOUS at 10:24

## 2024-10-24 RX ADMIN — VALACYCLOVIR HYDROCHLORIDE 500 MG: 500 TABLET, FILM COATED ORAL at 11:01

## 2024-10-24 RX ADMIN — POTASSIUM CHLORIDE 20 MEQ: 200 INJECTION, SOLUTION INTRAVENOUS at 20:01

## 2024-10-24 RX ADMIN — TACROLIMUS 1.5 MG: 0.5 CAPSULE ORAL at 17:27

## 2024-10-24 RX ADMIN — INSULIN LISPRO 2 UNITS: 100 INJECTION, SOLUTION INTRAVENOUS; SUBCUTANEOUS at 21:43

## 2024-10-24 RX ADMIN — DOXYCYCLINE 100 MG: 100 INJECTION, POWDER, LYOPHILIZED, FOR SOLUTION INTRAVENOUS at 13:21

## 2024-10-24 RX ADMIN — ONDANSETRON 4 MG: 2 INJECTION INTRAMUSCULAR; INTRAVENOUS at 17:37

## 2024-10-24 RX ADMIN — ALBUTEROL SULFATE 2.5 MG: 2.5 SOLUTION RESPIRATORY (INHALATION) at 07:03

## 2024-10-24 RX ADMIN — TAMSULOSIN HYDROCHLORIDE 0.4 MG: 0.4 CAPSULE ORAL at 16:05

## 2024-10-24 RX ADMIN — INSULIN GLARGINE 5 UNITS: 100 INJECTION, SOLUTION SUBCUTANEOUS at 11:01

## 2024-10-24 RX ADMIN — ACETAMINOPHEN 1000 MG: 1000 INJECTION, SOLUTION INTRAVENOUS at 07:00

## 2024-10-24 RX ADMIN — POTASSIUM CHLORIDE 20 MEQ: 14.9 INJECTION, SOLUTION INTRAVENOUS at 11:00

## 2024-10-24 RX ADMIN — MAGNESIUM SULFATE HEPTAHYDRATE 2 G: 40 INJECTION, SOLUTION INTRAVENOUS at 10:59

## 2024-10-24 NOTE — RESPIRATORY THERAPY NOTE
RT Protocol Note  Maximilian RAY Sourav Mann 48 y.o. female MRN: [id number]  Unit/Bed#: -01 Encounter: [id number]    Assessment    Principal Problem:    Nausea and vomiting  Active Problems:    Type 1 diabetes mellitus with diabetic chronic kidney disease, unspecified CKD stage (HCC)    Pancreatic insufficiency    Chronic pain disorder    GERD (gastroesophageal reflux disease)    Gastroparesis    Hx of lung transplant (HCC)    History of DVT (deep vein thrombosis)    Hematemesis with nausea    Toxic metabolic encephalopathy    Positive culture findings in sputum    Positive blood culture    SIRS (systemic inflammatory response syndrome) (HCC)    Pancytopenia, acquired (HCC)      Home Pulmonary Medications:         Past Medical History:   Diagnosis Date    Abnormal uterine bleeding (AUB)     ABPA (allergic bronchopulmonary aspergillosis) ([facility]) 07/08/2012    Acute kidney injury (HCC) 09/08/2017    Acute on chronic respiratory failure (HCC) 03/04/2017    Last Assessment & Plan:  Formatting of this note might be different from the original. Recurrent with clinical worsening with enlarging PTX, s/p multiple chest tubes, but new fevers 6/19 and worsening leukocytosis indicating infection as cause of 6/19 decompensation with profound hypoxia in setting of shunting through right lung and continued pneumothorax s/p 2nd chest tube placement.  6/27: Right    Anaphylaxis 06/06/2022    Anemia     Asthma     Chest pain 09/21/2021    Chronic pain     Chronic respiratory failure with hypoxia (HCC) 09/07/2021    COVID-19 06/03/2022    Cystic fibrosis (HCC)     Deep vein thrombosis (HCC)     Diabetes mellitus (HCC)     Fever 09/22/2024    GERD (gastroesophageal reflux disease)     HBP (high blood pressure)     HCAP (healthcare-associated pneumonia) 03/06/2020    History of transfusion     Influenza B 02/23/2020    Kidney stone     Moderate protein-calorie malnutrition (HCC) 01/08/2022    Neutropenic fever  (HCC) 09/26/2020     Pneumonia due to Pseudomonas species (HCC) 10/28/2010    Last Assessment & Plan:  Formatting of this note might be different from the original. Polymicrobial pna w/ pseudomonas, M. Abscessus, Cadida glabrata/albicans    PONV (postoperative nausea and vomiting)     Pseudomonas aeruginosa infection 2012    Renal calculus     Squamous cell skin cancer 2023    SCCIS- Left upper thigh    Transplant recipient     Lung- ,     Ulcerative colitis (HCC)     Wellness examination 2013    Formatting of this note might be different from the original. Discussed with patient and acknowledgement form signed on 2013 (ALYSHA)     Social History     Socioeconomic History    Marital status: /Civil Union     Spouse name: None    Number of children: None    Years of education: 12    Highest education level: None   Occupational History    Occupation: Disabled    Tobacco Use    Smoking status: Former     Current packs/day: 0.00     Average packs/day: 0.5 packs/day for 12.0 years (6.0 ttl pk-yrs)     Types: Cigarettes     Start date: 1993     Quit date: 2005     Years since quittin.8     Passive exposure: Past    Smokeless tobacco: Never   Vaping Use    Vaping status: Never Used   Substance and Sexual Activity    Alcohol use: Yes     Alcohol/week: 2.0 standard drinks of alcohol     Types: 2 Glasses of wine per week     Comment: social    Drug use: Yes     Frequency: 2.0 times per week     Types: Marijuana     Comment: medical edible marijuana prescribed    Sexual activity: Yes     Partners: Male     Birth control/protection: None   Other Topics Concern    None   Social History Narrative    Most recent tobacco use screenin2019    Do you currently or have you served in the [place] [employer]: No    Were you activated, into active duty, as a member of the [employer] or as a Reservist: No    Occupation: disabled    Education: 12    Marital status:     Exercise level: Occasional     Diet: Specific    low sugar, high fat    General stress level: High    Alcohol intake: Occasional    Caffeine intake: Heavy    Chewing tobacco: none    Illicit drugs: none    Guns present in home: No    Seat belts used routinely: Yes    Smoke alarm in home: Yes    Advance directive: Yes     Social Determinants of Health     Financial Resource Strain: Low Risk  (11/10/2022)    [facility]    Overall Financial Resource Strain (CARDIA)     Difficulty of Paying Living Expenses: Not hard at all   Food Insecurity: No Food Insecurity (10/24/2024)    Nursing - Inadequate Food Risk Classification     Worried About Running Out of Food in the Last Year: Never true     Ran Out of Food in the Last Year: Never true     Ran Out of Food in the Last Year: 1   Transportation Needs: No Transportation Needs (10/24/2024)    Nursing - Transportation Risk Classification     Lack of Transportation: Not on file     Lack of Transportation: 2   Physical Activity: Insufficiently Active (1/15/2024)    Exercise Vital Sign     Days of Exercise per Week: 7 days     Minutes of Exercise per Session: 20 min   Stress: No Stress Concern Present (1/15/2024)    [employer] [place] [employer] - Occupational Stress Questionnaire     Feeling of Stress : Only a little   Social Connections: Unknown (6/18/2024)    Received from [facility]    Social hCentive     How often do you feel lonely or isolated from those around you? (Adult - for ages 18 years and over): Not on file   Intimate Partner Violence: Unknown (10/24/2024)    Nursing IPS     Feels Physically and Emotionally Safe: Not on file     Physically Hurt by Someone: Not on file     Humiliated or Emotionally Abused by Someone: Not on file     Physically Hurt by Someone: 2     Hurt or Threatened by Someone: 2   Housing Stability: Unknown (10/24/2024)    Nursing: Inadequate Housing Risk Classification     Has  "Housing: Not on file     Worried About Losing Housing: Not on file     Unable to Get Utilities: Not on file     Unable to Pay for Housing in the Last Year: 2     Has Housin       Subjective         Objective    Physical Exam:        Vitals:  Blood pressure 123/54, pulse 74, temperature 97.9 °F (36.6 °C), resp. rate 20, height 5' 2\" (1.575 m), weight 51.3 kg (113 lb 1.5 oz), SpO2 96%, not currently breastfeeding.          Imaging and other studies: Results Review Statement: No pertinent imaging studies reviewed.    O2 Device: RA     Plan    Respiratory Plan: Home Bronchodilator Patient pathway        Resp Comments: pt with hx of asthma will continue with albuterol and 3% tid   "

## 2024-10-24 NOTE — ASSESSMENT & PLAN NOTE
Patient with nausea and vomiting and questionable hematemesis.  CT scan showing diffuse esophageal thickening and mucosal suggesting esophagitis.  GI team consulted and recommending medical management of nausea and vomiting.  Suspected gastroparesis in the setting of polypharmacy and altered mental status.    Plan:  Case discussed with infectious disease team, esophagitis suspected to be in the setting of minocycline versus less likely Candida esophagitis, per infectious disease micafungin discontinued.  Recommended scheduled Reglan and outpatient EGD.  Will continue with PPI every 12 hours and Pepcid

## 2024-10-24 NOTE — ASSESSMENT & PLAN NOTE
Resumed oxycodone 10 mg given improvement in encephalopathy.  Patient reports pain in her lower back due to kidney stone.

## 2024-10-24 NOTE — ASSESSMENT & PLAN NOTE
Patient with potassium of 2.9.  Repleted with IV potassium 40 mg x 2  Currently not tolerating oral potassium   continue repletion  Monitoring on telemetry given low potassium

## 2024-10-24 NOTE — ASSESSMENT & PLAN NOTE
Patient has underlying gastroparesis, GERD which has been poorly controlled recently.  She had previous fundoplication, recent upper GI series has a sliding hiatal hernia.  He was most recently seen by outpatient provider and started on minocycline given upper respiratory symptoms and MRSA on sputum culture.  Symptoms began after starting medication.  Patient further clarified though that she was also off of her reflux medications leading up to her planned pH study/EGD.  Suspect that the patient had worsening of her esophagitis with minocycline and being off of her medications.  Lower suspicion for true fungal esophagitis or viral esophagitis  Will continue on IV doxycycline per transplant center  No further oral minocycline  Will discontinue micafungin  Will monitor for ongoing symptom improvement  Trend fever curve/vitals  Repeat CBC/chemistry tomorrow  GI evaluation appreciated  Additional supportive care as per primary  Additional interventions pending clinical course/symptom improvement

## 2024-10-24 NOTE — ASSESSMENT & PLAN NOTE
Noted to have declining cell counts which I suspect is volume related but also reactive to the inflammatory process in the esophagus as above.  Continue prophylactic therapies  Repeat CBC tomorrow  Transfusional support per primary

## 2024-10-24 NOTE — ASSESSMENT & PLAN NOTE
History of severe GERD status post fundoplication in 2023  Noted esophagitis on CT suspected in the setting of pill esophagitis versus Candida esophagitis  Protonix for milligrams IV every 12 hours and famotidine

## 2024-10-24 NOTE — ASSESSMENT & PLAN NOTE
Patient was noted with tachycardia, leukocytosis and isolated episode of fever.  I suspect that this is related primarily to her GI symptoms with nausea/retching and likely a component of dehydration.  Suspect blood cultures below represent contaminant/colonization of her port pin.  CT imaging of the chest/abdomen/pelvis otherwise unremarkable.  CT of the head notable for sinus congestion which may be due to ongoing episodes of reflux and her recent consistent vomiting.  Patient offers otherwise no other localizing complaints.  Symptoms overall have improved.  Clinical parameters also overall improved.  Antibiotic/Antifungal as below  Trend fever curve/vitals  Repeat CBC/chemistry tomorrow for treatment response/dosing  Follow-up second set of blood culture for completeness  We will hold off on trending Pro-Seth further  Will continue prophylactic medications as below  GI evaluation noted  Continue discussion of case with transplant center  Additional supportive cares per primary  Hopeful transition home in the next 24 to 48 hours

## 2024-10-24 NOTE — ASSESSMENT & PLAN NOTE
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  Has had recurrent admissions for pneumonia and Pseudomonas/MSSA and status post pneumonectomy    Given concern that patient  has nausea and vomiting questionable if the patient was able to keep down the medication the past few days..  Patient was recently started minocycline for MRSA airway infection.  Per patient's  the symptoms started after patient started minocycline.  Switched to IV doxycycline  Discussed case with [facility] lung transplant team and recommended decreasing dose of tacrolimus to 1.5 mg twice a day given tacrolimus level of 12.  Patient with improvement likely 1 need transfer to [facility] anymore.  Discussed with the team tomorrow and likely canceled the transfer.

## 2024-10-24 NOTE — ASSESSMENT & PLAN NOTE
As of today patient noted to have all her cell counts low.  Will start IV Venofer given history of iron deficiency anemia.  Will recheck CBC in the morning.

## 2024-10-24 NOTE — ASSESSMENT & PLAN NOTE
A1c of 7.5.  Creatinine appears to be close to baseline.  Suspect hypoglycemia seen on admission likely contributing to some degree of dehydration along with patient's poor oral intake.  Creatinine can impact antibiotic dosing and I suspect is contributing to elevated Pro-Seth.  Will hold off on trending Pro-Seth now  Repeat chemistry tomorrow  Glucose management per primary

## 2024-10-24 NOTE — ASSESSMENT & PLAN NOTE
Patient was seen by transplant provider on 10/10 and sputum cultures later isolated MRSA.  Had been started on minocycline given symptoms at the time.  Course now complicated by the above.  CT imaging without any acute infiltrate/parenchymal disease.  Case was discussed with transplant center by primary service and patient was recommended for IV doxycycline.  Plan to continue IV doxycycline until discharge per transplant center  Trend fever curve/vitals  Repeat CBC/chemistry tomorrow  Monitor for symptom improvement of the above

## 2024-10-24 NOTE — PLAN OF CARE
Problem: METABOLIC, FLUID AND ELECTROLYTES - ADULT  Goal: Electrolytes maintained within normal limits  Description: INTERVENTIONS:  - Monitor labs and assess patient for signs and symptoms of electrolyte imbalances  - Administer electrolyte replacement as ordered  - Monitor response to electrolyte replacements, including repeat lab results as appropriate  - Instruct patient on fluid and nutrition as appropriate  Outcome: Progressing  Goal: Fluid balance maintained  Description: INTERVENTIONS:  - Monitor labs   - Monitor I/O and WT  - Instruct patient on fluid and nutrition as appropriate  - Assess for signs & symptoms of volume excess or deficit  Outcome: Progressing  Goal: Glucose maintained within target range  Description: INTERVENTIONS:  - Monitor Blood Glucose as ordered  - Assess for signs and symptoms of hyperglycemia and hypoglycemia  - Administer ordered medications to maintain glucose within target range  - Assess nutritional intake and initiate nutrition service referral as needed  Outcome: Progressing     Problem: Alteration in Orientation  Goal: Treatment Goal: Demonstrate a reduction of confusion and improved orientation to person, place, time and/or situation upon discharge, according to optimum baseline/ability  Outcome: Progressing  Goal: Interact with staff daily  Description: Interventions:  - Assess and re-assess patient's level of orientation  - Engage patient in 1 on 1 interactions, daily, for a minimum of 15 minutes   - Establish rapport/trust with patient   Outcome: Progressing  Goal: Express concerns related to confused thinking related to:  Description: Interventions:  - Encourage patient to express feelings, fears, frustrations, hopes  - Assign consistent caregivers   - [place]/re-orient patient as needed  - Allow comfort items, as appropriate  - Provide visual cues, signs, etc.   Outcome: Progressing  Goal: Allow medical examinations, as recommended  Description: Interventions:  -  Provide physical/neurological exams and/or referrals, per provider   Outcome: Progressing  Goal: Cooperate with recommended testing/procedures  Description: Interventions:  - Determine need for ancillary testing  - Observe for mental status changes  - Implement falls/precaution protocol   Outcome: Progressing  Goal: Attend and participate in unit activities, including therapeutic, recreational, and educational groups  Description: Interventions:  - Provide therapeutic and educational activities daily, encourage attendance and participation, and document same in the medical record   - Provide appropriate opportunities for reminiscence   - Provide a consistent daily routine   - Encourage family contact/visitation   Outcome: Progressing  Goal: Complete daily ADLs, including personal hygiene independently, as able  Description: Interventions:  - Observe, teach, and assist patient with ADLS  Outcome: Progressing

## 2024-10-24 NOTE — PROGRESS NOTES
Progress Note - Infectious Disease   Name: Maximilian Mann 48 y.o. female I MRN: [id number]  Unit/Bed#: -01 I Date of Admission: 10/21/2024   Date of Service: 10/24/2024 I Hospital Day: 2     Assessment & Plan  SIRS (systemic inflammatory response syndrome) (HCC)  Patient was noted with tachycardia, leukocytosis and isolated episode of fever.  I suspect that this is related primarily to her GI symptoms with nausea/retching and likely a component of dehydration.  Suspect blood cultures below represent contaminant/colonization of her port pin.  CT imaging of the chest/abdomen/pelvis otherwise unremarkable.  CT of the head notable for sinus congestion which may be due to ongoing episodes of reflux and her recent consistent vomiting.  Patient offers otherwise no other localizing complaints.  Symptoms overall have improved.  Clinical parameters also overall improved.  Antibiotic/Antifungal as below  Trend fever curve/vitals  Repeat CBC/chemistry tomorrow for treatment response/dosing  Follow-up second set of blood culture for completeness  We will hold off on trending Pro-Seth further  Will continue prophylactic medications as below  GI evaluation noted  Continue discussion of case with transplant center  Additional supportive cares per primary  Hopeful transition home in the next 24 to 48 hours    Nausea and vomiting  Patient has underlying gastroparesis, GERD which has been poorly controlled recently.  She had previous fundoplication, recent upper GI series has a sliding hiatal hernia.  He was most recently seen by outpatient provider and started on minocycline given upper respiratory symptoms and MRSA on sputum culture.  Symptoms began after starting medication.  Patient further clarified though that she was also off of her reflux medications leading up to her planned pH study/EGD.  Suspect that the patient had worsening of her esophagitis with minocycline and being off of her medications.  Lower suspicion  for true fungal esophagitis or viral esophagitis  Will continue on IV doxycycline per transplant center  No further oral minocycline  Will discontinue micafungin  Will monitor for ongoing symptom improvement  Trend fever curve/vitals  Repeat CBC/chemistry tomorrow  GI evaluation appreciated  Additional supportive care as per primary  Additional interventions pending clinical course/symptom improvement  Toxic metabolic encephalopathy  Reportedly was acutely confused on presentation.  Patient appears more appropriate on exam.  CT head without acute findings.  Sinus disease noted on imaging which I suspect is not contributing.  Monitor mental status  Low threshold for repeat imaging of the head  Antibiotic/antifungal as above  Trend fever curve/WBC  Positive culture findings in sputum  Patient was seen by transplant provider on 10/10 and sputum cultures later isolated MRSA.  Had been started on minocycline given symptoms at the time.  Course now complicated by the above.  CT imaging without any acute infiltrate/parenchymal disease.  Case was discussed with transplant center by primary service and patient was recommended for IV doxycycline.  Plan to continue IV doxycycline until discharge per transplant center  Trend fever curve/vitals  Repeat CBC/chemistry tomorrow  Monitor for symptom improvement of the above  Gastroparesis  Known at baseline.  Suspect that this may be further contributing to the above.  Will continue IV therapies for now.  GI evaluation appreciated.  GERD (gastroesophageal reflux disease)  Noted to have poorly controlled disease at baseline.  Recent upper GI series as above.  No plans for EGD at this time per GI.  Will continue with care as above and monitor for symptom improvement.    Positive blood culture  1 out of 2 blood cultures from admission isolated coagulase-negative staph.  Uncertain where this was collected from, if this was an earlier IV or possibly her port.  Peripheral blood culture  however without any growth.  Suspect contaminant/colonization.  Maintain current port  Follow-up pending blood culture  No plans for repeat culture given other sources above  Trend fever curve/vitals  Hx of lung transplant (HCC)  Patient previously underwent double lung transplant in 2013.  She required retransplant of the left lung in 2017.  Course was then complicated by need for right-sided pneumonectomy in 2022 given chronic progressive infections.  She remains on regimen with tacrolimus and prednisone.  Needs to follow with chronic providers.  Prophylactic medications and dosing reviewed.  Continue chronic atovaquone, Valtrex and azithromycin  Continue immunosuppressive therapy otherwise  Monitor closely for any drug drug interactions  Close follow-up with transplant center  Type 1 diabetes mellitus with diabetic chronic kidney disease, unspecified CKD stage ([facility])  A1c of 7.5.  Creatinine appears to be close to baseline.  Suspect hypoglycemia seen on admission likely contributing to some degree of dehydration along with patient's poor oral intake.  Creatinine can impact antibiotic dosing and I suspect is contributing to elevated Pro-Seth.  Will hold off on trending Pro-Seth now  Repeat chemistry tomorrow  Glucose management per primary    Pancytopenia, acquired ([facility])  Noted to have declining cell counts which I suspect is volume related but also reactive to the inflammatory process in the esophagus as above.  Continue prophylactic therapies  Repeat CBC tomorrow  Transfusional support per primary    Above plan discussed in detail with the patient at bedside  Above plan discussed in detail with primary service attending who is aware of plans for continued IV therapies until discharge and hopeful discharge in the next 24 to 48 hours    ID consult service will continue to follow.    Antibiotics:  Doxycycline 3  Micafungin 2  Prophylaxis ongoing    24 Hour events:  Yesterday and overnight notes reviewed and no acute  events noted.    Subjective:  Patient seen later today and she denied having any further nausea, vomiting, chest pain.  She reported actually feeling better as of yesterday evening.  We discussed overall suspicion that minocycline was causing worsening of her gastritis for which the patient was actually already off her GERD medication to have pH testing performed.  I reviewed with her my suspicion being low for true candidal esophagitis.  We discussed potential transition to nystatin for any oral or pharyngeal involvement.  We also discussed anticipation for discharge if she continues to do well.  Primary service discussed case in detail with patient's transplant center.    Objective:  Vitals:  Temp:  [97.7 °F (36.5 °C)-99.4 °F (37.4 °C)] 97.7 °F (36.5 °C)  HR:  [74-80] 74  Resp:  [20] 20  BP: (107-140)/(51-77) 140/77  SpO2:  [95 %-97 %] 96 %  Temp (24hrs), Av.2 °F (36.8 °C), Min:97.7 °F (36.5 °C), Max:99.4 °F (37.4 °C)  Current: Temperature: 97.7 °F (36.5 °C)    Physical Exam:   General Appearance:  Alert, interactive, nontoxic, no acute distress.  Speaking full sentences and appears comfortable.   Throat: Oropharynx moist without lesions.    Lungs:   Clear breath sounds on the left.   Heart:  RRR; no murmur, rub or gallop noted   Abdomen:   Soft, non-tender, non-distended, positive bowel sounds.     Extremities: No clubbing, cyanosis or edema   Skin: No new rashes or lesions. No new draining wounds noted.       Labs, Imaging, & Other studies:   All pertinent labs and imaging studies in PACS were personally reviewed as below.  Results from last 7 days   Lab Units 10/24/24  0521 10/22/24  0423 10/21/24  2102   WBC Thousand/uL 3.29* 16.95* 20.92*   HEMOGLOBIN g/dL 7.7* 11.8 12.9   PLATELETS Thousands/uL 71* 121* 142*     Results from last 7 days   Lab Units 10/24/24  0521 10/22/24  0423 10/21/24  2102   POTASSIUM mmol/L 2.9* 4.0 3.9   CHLORIDE mmol/L 105 109* 99   CO2 mmol/L 28 25 27   BUN mg/dL 15 57* 68*    CREATININE mg/dL 1.05 1.21 1.14   EGFR ml/min/1.73sq m 63 53 57   CALCIUM mg/dL 7.4* 7.5* 8.4   AST U/L  --  12* 14   ALT U/L  --  15 19   ALK PHOS U/L  --  63 65     Results from last 7 days   Lab Units 10/22/24  0106 10/22/24  0104   BLOOD CULTURE  No Growth at 48 hrs. Staphylococcus coagulase negative*   GRAM STAIN RESULT   --  Gram positive cocci in clusters*       Lab interpretation/comments: Drop in hemoglobin along with white count and platelets noted.  Suspect this is multifactorial from volume and also recent medication issues    Imaging interpretation/comments: No new imaging    Culture data: Second set of blood culture remains negative.  Respiratory viral panel negative.  Pro-Seth only mildly elevated    External notes: None

## 2024-10-24 NOTE — CASE MANAGEMENT
Case Management Discharge Planning Note    Patient name Maximilian Mann  Location /-01 MRN [id number]  : 1975 Date 10/24/2024       Current Admission Date: 10/21/2024  Current Admission Diagnosis:Nausea and vomiting   Patient Active Problem List    Diagnosis Date Noted Date Diagnosed    Pancytopenia, acquired ([facility]) 10/24/2024     Positive culture findings in sputum 10/23/2024     Positive blood culture 10/23/2024     SIRS (systemic inflammatory response syndrome) ([facility]) 10/23/2024     Hematemesis with nausea 10/22/2024     Toxic metabolic encephalopathy 10/22/2024     Electrolyte abnormality 2024     Sepsis ([facility]) 2024     Acute pyelonephritis 2024     Elevated serum creatinine 2024     History of pulmonary embolus (PE) 2024     History of DVT (deep vein thrombosis) 2024     Sore throat 2024     Vaginal yeast infection 2024     Intestinal metaplasia of antrum of stomach without dysplasia 2024     Hx of lung transplant ([facility]) 01/15/2024     Continuous opioid dependence ([facility]) 01/15/2024     Stage 3a chronic kidney disease (HCC) 2023     Depressed mood 2023     Chronic pancreatitis, unspecified pancreatitis type ([facility]) 2023     Pulmonary embolus (HCC) 2022     Right leg pain 10/13/2022     Chronic cough 2022     Renal calculus, left 2022     Pulmonary nodules 2022     Rib pain 2022     Gastroparesis 2022     Ulcerative pancolitis without complication (HCC) 2022     Neuropathic pain of right foot 01/10/2022     Weakness of right lower extremity 2021     Pulmonary hypertension (HCC) 2021     Cystic fibrosis (HCC) 2021     Essential hypertension 2021     Hypercholesteremia 2021     Long term current use of bisphosphonates 2020     Long term current use of systemic steroids 2020     Osteopenia 2020     Chronic pain disorder 2020      Asthma 03/06/2020     Nausea and vomiting 02/04/2020     Hiatal hernia 06/25/2019     Anemia 07/09/2018     DVT (deep venous thrombosis) ([facility]) 06/29/2018     Immunosuppression ([facility]) 03/12/2017     Colon polyp, personal history 07/18/2016     S/P pneumonectomy 03/07/2013     Anxiety 07/08/2012     Type 1 diabetes mellitus with diabetic chronic kidney disease, unspecified CKD stage ([facility]) 10/28/2010     Pancreatic insufficiency 10/28/2010     Chronic sinusitis 10/28/2010     GERD (gastroesophageal reflux disease) 10/28/2010       LOS (days): 2  Geometric Mean LOS (GMLOS) (days): 3.9  Days to GMLOS:1.3     OBJECTIVE:  Risk of Unplanned Readmission Score: 45.68         Current admission status: Inpatient   Preferred Pharmacy:   [facility] - [place], [place] - [address] "[employer]  [address]  [facility] [address]  Phone: [phone] Fax: [phone]    [facility]/pharmacy #[id number] - [place] - [address]  [address]  [facility] [address]  Phone: [phone] Fax: [phone]    [facility]/pharmacy #[id number] - [place] - [address]  [address]  [place] [address]  Phone: [phone] Fax: [phone]    Primary Care Provider: Solis Avila MD    Primary Insurance: BLUE CROSS  Secondary Insurance: MEDICARE    DISCHARGE DETAILS:     CM continuing to follow for any needs that may require CM involvement. Per rounding with [facility] pt is due to be transferred.

## 2024-10-24 NOTE — PROGRESS NOTES
Progress Note - Hospitalist   Name: Maximilian Mann 48 y.o. female I MRN: [id number]  Unit/Bed#: -01 I Date of Admission: 10/21/2024   Date of Service: 10/24/2024 I Hospital Day: 2    Assessment & Plan  Nausea and vomiting  Patient with nausea and vomiting and questionable hematemesis.  CT scan showing diffuse esophageal thickening and mucosal suggesting esophagitis.  GI team consulted and recommending medical management of nausea and vomiting.  Suspected gastroparesis in the setting of polypharmacy and altered mental status.    Plan:  Case discussed with infectious disease team, esophagitis suspected to be in the setting of minocycline versus less likely Candida esophagitis, per infectious disease micafungin discontinued.  Recommended scheduled Reglan and outpatient EGD.  Will continue with PPI every 12 hours and Pepcid    Toxic metabolic encephalopathy  Unclear, will need to get more history from the family.  Patient identifies that she is confused and answers yes.  I anticipate is due to polypharmacy with her having Xanax, oxycodone, Lyrica and also Marinol. Those medications been discontinued  Questionable if supratherapeutic tacrolimus level pending tacrolimus blood work.  He has positive for opiates and oxycodone.  Patient known for taking oxycodone and naloxone as needed.  CT head showing Small extra-axial hyperdensity at the anterior aspect of the left middle cranial fossa. Possible adjacent osseous remodeling. Favor small meningioma with hemorrhage felt less likely.  MRI brain with no acute findings  Plan:  Noted significant improvement mental status as of today  Patient with improvement in oral intake.  Tomorrow continues with  advanced her diet will discontinue IV fluids  Likely does not need transfer to [facility] anymore.  Will likely cancel transfer tomorrow.    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  Has had recurrent admissions for pneumonia and Pseudomonas/MSSA and status post pneumonectomy    Given concern that patient  has nausea and vomiting questionable if the patient was able to keep down the medication the past few days..  Patient was recently started minocycline for MRSA airway infection.  Per patient's  the symptoms started after patient started minocycline.  Switched to IV doxycycline  Discussed case with [facility] lung transplant team and recommended decreasing dose of tacrolimus to 1.5 mg twice a day given tacrolimus level of 12.  Patient with improvement likely 1 need transfer to [facility] anymore.  Discussed with the team tomorrow and likely canceled the transfer.    GERD (gastroesophageal reflux disease)  History of severe GERD status post fundoplication in 2023  Noted esophagitis on CT suspected in the setting of pill esophagitis versus Candida esophagitis  Protonix for milligrams IV every 12 hours and famotidine    Type 1 diabetes mellitus with diabetic chronic kidney disease, unspecified CKD stage (HCC)  I am not clear what her home regimen is at this time.  That will need to be clarified  From the records it looks like she takes glargine 12 units in the morning, and NovoLog 10 each with breakfast, 8 units with lunch and 12 with dinner.    Will do glargine 5 units in the morning, insulin sliding scale every 6 hours as she is NPO.  Pancreatic insufficiency  Continue Creon when tolerating diet  Chronic pain disorder  Resumed oxycodone 10 mg given improvement in encephalopathy.  Patient reports pain in her lower back due to kidney stone.  History of DVT (deep vein thrombosis)  History of left lower extremity DVT on Eliquis.  Resume Eliquis given no concern for bleeding.  Gastroparesis  Started Reglan  Hematemesis with nausea  Patient with nausea and  hematemesis    N.p.o.  Protonix 40 mg IV every 12 hours  GI consultation  Discontinued Eliquis and other none emergent medications at this time  Positive culture findings in sputum  Patient found to have MRSA in her sputum per lung transplant team.  Was started on minocycline therapy however patient developed symptoms.  Currently on IV doxycycline.  Will discuss with transplant team tomorrow for further management.  Positive blood culture  1 out of 2 culture growing Staph epidermidis,  Infectious disease involved and likely contaminant.  No changes in the antibiotic and continue following  SIRS (systemic inflammatory response syndrome) (HCC)    Pancytopenia, acquired (HCC)  As of today patient noted to have all her cell counts low.  Will start IV Venofer given history of iron deficiency anemia.  Will recheck CBC in the morning.  Hypokalemia  Patient with potassium of 2.9.  Repleted with IV potassium 40 mg x 2  Currently not tolerating oral potassium   continue repletion  Monitoring on telemetry given low potassium    VTE Pharmacologic Prophylaxis: VTE Score: 4 Moderate Risk (Score 3-4) - Pharmacological DVT Prophylaxis Ordered: enoxaparin (Lovenox).    Mobility:   Basic Mobility Inpatient Raw Score: 20  JH-HLM Goal: 6: Walk 10 steps or more  JH-HLM Achieved: 7: Walk 25 feet or more  JH-HLM Goal achieved. Continue to encourage appropriate mobility.    Patient Centered Rounds: I performed bedside rounds with nursing staff today.   Discussions with Specialists or Other Care Team Provider: Discussed case with infectious disease    Education and Discussions with Family / Patient: Patient  Current Length of Stay: 2 day(s)  Current Patient Status: Inpatient   Certification Statement: The patient will continue to require additional inpatient hospital stay due to difficulty swallowing in the setting of esophagitis, IV antibiotics  Discharge Plan: Anticipate discharge in 24-48 hrs to home.    Code Status: Level 1 - Full  Code    Subjective   Patient with significant improvement in terms of mental status.  Reports back pain which is related to her kidney stone.  Resumed oxycodone pain regiment    Objective :  Temp:  [97.7 °F (36.5 °C)-99.4 °F (37.4 °C)] 97.7 °F (36.5 °C)  HR:  [74-80] 74  BP: (107-140)/(51-77) 140/77  Resp:  [20] 20  SpO2:  [95 %-97 %] 96 %  O2 Device: None (Room air)    Body mass index is 20.69 kg/m².     Input and Output Summary (last 24 hours):     Intake/Output Summary (Last 24 hours) at 10/24/2024 1754  Last data filed at 10/24/2024 1437  Gross per 24 hour   Intake 936.67 ml   Output 3000 ml   Net -2063.33 ml       Physical Exam  Vitals and nursing note reviewed.   Constitutional:       General: She is not in acute distress.     Appearance: She is well-developed. She is not ill-appearing.   HENT:      Head: Normocephalic and atraumatic.   Eyes:      Conjunctiva/sclera: Conjunctivae normal.   Cardiovascular:      Rate and Rhythm: Normal rate and regular rhythm.      Heart sounds: No murmur heard.  Pulmonary:      Effort: Pulmonary effort is normal. No respiratory distress.      Breath sounds: Normal breath sounds.   Abdominal:      Palpations: Abdomen is soft.      Tenderness: There is no abdominal tenderness. There is right CVA tenderness and left CVA tenderness.   Musculoskeletal:         General: No swelling.      Cervical back: Neck supple.      Right lower leg: No edema.      Left lower leg: No edema.   Skin:     General: Skin is warm and dry.      Capillary Refill: Capillary refill takes less than 2 seconds.   Neurological:      Mental Status: She is alert.   Psychiatric:         Mood and Affect: Mood normal.           Lines/Drains:  Lines/Drains/Airways       Active Status       Name Placement date Placement time Site Days    Port A Cath 11/25/22 Right Chest 11/25/22  1201  Chest  699                    Central Line:  Goal for removal:  Chronic port         Telemetry:  Telemetry Orders (From admission,  onward)               24 Hour Telemetry Monitoring  Continuous x 24 Hours (Telem)        Question:  Reason for 24 Hour Telemetry  Answer:  Decompensated CHF- and any one of the following: continuous diuretic infusion or total diuretic dose >200 mg daily, associated electrolyte derangement (I.e. K < 3.0), ionotropic drip (continuous infusion), hx of ventricular arrhythmia, or new EF < 35%                     Telemetry Reviewed: Normal Sinus Rhythm  Indication for Continued Telemetry Use: Metabolic/electrolyte disturbance with high probability of dysrhythmia               Lab Results: I have reviewed the following results:   Results from last 7 days   Lab Units 10/24/24  0521   WBC Thousand/uL 3.29*   HEMOGLOBIN g/dL 7.7*   HEMATOCRIT % 25.8*   PLATELETS Thousands/uL 71*   SEGS PCT % 45   LYMPHO PCT % 40   MONO PCT % 10   EOS PCT % 5     Results from last 7 days   Lab Units 10/24/24  0521 10/22/24  0423   SODIUM mmol/L 139 140   POTASSIUM mmol/L 2.9* 4.0   CHLORIDE mmol/L 105 109*   CO2 mmol/L 28 25   BUN mg/dL 15 57*   CREATININE mg/dL 1.05 1.21   ANION GAP mmol/L 6 6   CALCIUM mg/dL 7.4* 7.5*   ALBUMIN g/dL  --  3.0*   TOTAL BILIRUBIN mg/dL  --  0.47   ALK PHOS U/L  --  63   ALT U/L  --  15   AST U/L  --  12*   GLUCOSE RANDOM mg/dL 113 261*         Results from last 7 days   Lab Units 10/24/24  1211 10/24/24  0651 10/23/24  2334 10/23/24  1651 10/23/24  1146 10/23/24  0350 10/22/24  2244 10/22/24  1535 10/22/24  1046 10/22/24  0749 10/22/24  0051 10/21/24  2048   POC GLUCOSE mg/dl 148* 108 131 153* 181* 125 200* 138 164* 214* 292* 406*         Results from last 7 days   Lab Units 10/24/24  0521 10/21/24  2102   LACTIC ACID mmol/L  --  1.9   PROCALCITONIN ng/ml 0.35* 0.37*       Recent Cultures (last 7 days):   Results from last 7 days   Lab Units 10/22/24  0106 10/22/24  0104   BLOOD CULTURE  No Growth at 48 hrs. Staphylococcus coagulase negative*   GRAM STAIN RESULT   --  Gram positive cocci in clusters*        Imaging Results Review: No pertinent imaging studies reviewed.  Other Study Results Review: No additional pertinent studies reviewed.    Last 24 Hours Medication List:     Current Facility-Administered Medications:     acetaminophen (Ofirmev) injection 1,000 mg, Q6H PRN, Last Rate: 1,000 mg (10/24/24 0700)    albuterol inhalation solution 2.5 mg, BID    atovaquone (MEPRON) oral suspension 1,500 mg, Daily    [START ON 10/25/2024] azithromycin (ZITHROMAX) 250 mg in sodium chloride 0.9 % 250 mL IVPB, Once    budesonide-formoterol (SYMBICORT) 160-4.5 mcg/act inhaler 2 puff, Q12H    doxycycline (VIBRAMYCIN) 100 mg in sodium chloride 0.9 % 100 mL IVPB, Q12H, Last Rate: 100 mg (10/24/24 1321)    enoxaparin (LOVENOX) subcutaneous injection 50 mg, Q12H DOTTY    Famotidine (PF) (PEPCID) injection 20 mg, Q24H DOTTY    furosemide (LASIX) injection 20 mg, Daily    insulin glargine (LANTUS) subcutaneous injection 5 Units 0.05 mL, QAM    insulin lispro (HumALOG/ADMELOG) 100 units/mL subcutaneous injection 1-5 Units, Q6H **AND** Fingerstick Glucose (POCT), Q6H    methylPREDNISolone sodium succinate (Solu-MEDROL) injection 12 mg, Daily    metoclopramide (REGLAN) injection 10 mg, Q6H DOTTY    multi-electrolyte (PLASMALYTE-A/ISOLYTE-S PH 7.4) IV solution, Continuous, Last Rate: 100 mL/hr (10/24/24 0652)    ondansetron (ZOFRAN) injection 4 mg, Q6H PRN    oxybutynin (DITROPAN-XL) 24 hr tablet 15 mg, HS    oxyCODONE (ROXICODONE) immediate release tablet 10 mg, Q8H PRN    pantoprazole (PROTONIX) injection 40 mg, Q12H DOTTY    potassium chloride 20 mEq IVPB (premix), Q2H, Last Rate: 20 mEq (10/24/24 1604)    scopolamine (TRANSDERM-SCOP) 1 mg/3 days TD 72 hr patch 1 patch, Q72H    sodium chloride 3 % inhalation solution 4 mL, BID    tacrolimus (PROGRAF) capsule 1.5 mg, BID    tamsulosin (FLOMAX) capsule 0.4 mg, Daily With Dinner    valACYclovir (VALTREX) tablet 500 mg, Daily      **Please Note: This note may have been constructed using a  voice recognition system.**

## 2024-10-24 NOTE — ASSESSMENT & PLAN NOTE
Unclear, will need to get more history from the family.  Patient identifies that she is confused and answers yes.  I anticipate is due to polypharmacy with her having Xanax, oxycodone, Lyrica and also Marinol. Those medications been discontinued  Questionable if supratherapeutic tacrolimus level pending tacrolimus blood work.  He has positive for opiates and oxycodone.  Patient known for taking oxycodone and naloxone as needed.  CT head showing Small extra-axial hyperdensity at the anterior aspect of the left middle cranial fossa. Possible adjacent osseous remodeling. Favor small meningioma with hemorrhage felt less likely.  MRI brain with no acute findings  Plan:  Noted significant improvement mental status as of today  Patient with improvement in oral intake.  Tomorrow continues with  advanced her diet will discontinue IV fluids  Likely does not need transfer to [facility] anymore.  Will likely cancel transfer tomorrow.

## 2024-10-25 PROBLEM — E87.6 HYPOKALEMIA: Status: RESOLVED | Noted: 2024-10-24 | Resolved: 2024-10-25

## 2024-10-25 LAB
ALBUMIN SERPL BCG-MCNC: 2.6 G/DL (ref 3.5–5)
ALP SERPL-CCNC: 53 U/L (ref 34–104)
ALT SERPL W P-5'-P-CCNC: 10 U/L (ref 7–52)
ANION GAP SERPL CALCULATED.3IONS-SCNC: 4 MMOL/L (ref 4–13)
AST SERPL W P-5'-P-CCNC: 16 U/L (ref 13–39)
ATRIAL RATE: 107 BPM
BACTERIA BLD CULT: ABNORMAL
BASOPHILS # BLD AUTO: 0.01 THOUSANDS/ΜL (ref 0–0.1)
BASOPHILS NFR BLD AUTO: 0 % (ref 0–1)
BILIRUB SERPL-MCNC: 0.31 MG/DL (ref 0.2–1)
BUN SERPL-MCNC: 15 MG/DL (ref 5–25)
CALCIUM ALBUM COR SERPL-MCNC: 9.1 MG/DL (ref 8.3–10.1)
CALCIUM SERPL-MCNC: 8 MG/DL (ref 8.4–10.2)
CHLORIDE SERPL-SCNC: 105 MMOL/L (ref 96–108)
CO2 SERPL-SCNC: 27 MMOL/L (ref 21–32)
CREAT SERPL-MCNC: 1.3 MG/DL (ref 0.6–1.3)
EOSINOPHIL # BLD AUTO: 0.1 THOUSAND/ΜL (ref 0–0.61)
EOSINOPHIL NFR BLD AUTO: 3 % (ref 0–6)
ERYTHROCYTE [DISTWIDTH] IN BLOOD BY AUTOMATED COUNT: 14.3 % (ref 11.6–15.1)
GFR SERPL CREATININE-BSD FRML MDRD: 49 ML/MIN/1.73SQ M
GLUCOSE SERPL-MCNC: 100 MG/DL (ref 65–140)
GLUCOSE SERPL-MCNC: 103 MG/DL (ref 65–140)
GLUCOSE SERPL-MCNC: 171 MG/DL (ref 65–140)
GLUCOSE SERPL-MCNC: 208 MG/DL (ref 65–140)
GLUCOSE SERPL-MCNC: 208 MG/DL (ref 65–140)
GLUCOSE SERPL-MCNC: 434 MG/DL (ref 65–140)
GRAM STN SPEC: ABNORMAL
HCT VFR BLD AUTO: 26.6 % (ref 34.8–46.1)
HGB BLD-MCNC: 8.1 G/DL (ref 11.5–15.4)
IMM GRANULOCYTES # BLD AUTO: 0.02 THOUSAND/UL (ref 0–0.2)
IMM GRANULOCYTES NFR BLD AUTO: 1 % (ref 0–2)
LYMPHOCYTES # BLD AUTO: 1.07 THOUSANDS/ΜL (ref 0.6–4.47)
LYMPHOCYTES NFR BLD AUTO: 36 % (ref 14–44)
MCH RBC QN AUTO: 26.6 PG (ref 26.8–34.3)
MCHC RBC AUTO-ENTMCNC: 30.5 G/DL (ref 31.4–37.4)
MCV RBC AUTO: 88 FL (ref 82–98)
MECA+MECC ISLT/SPM QL: DETECTED
MONOCYTES # BLD AUTO: 0.24 THOUSAND/ΜL (ref 0.17–1.22)
MONOCYTES NFR BLD AUTO: 8 % (ref 4–12)
NEUTROPHILS # BLD AUTO: 1.52 THOUSANDS/ΜL (ref 1.85–7.62)
NEUTS SEG NFR BLD AUTO: 52 % (ref 43–75)
NRBC BLD AUTO-RTO: 0 /100 WBCS
PLATELET # BLD AUTO: 88 THOUSANDS/UL (ref 149–390)
PMV BLD AUTO: 11.9 FL (ref 8.9–12.7)
POTASSIUM SERPL-SCNC: 4.5 MMOL/L (ref 3.5–5.3)
PR INTERVAL: 128 MS
PROT SERPL-MCNC: 5.1 G/DL (ref 6.4–8.4)
QRS AXIS: 64 DEGREES
QRSD INTERVAL: 70 MS
QT INTERVAL: 400 MS
QTC INTERVAL: 534 MS
RBC # BLD AUTO: 3.04 MILLION/UL (ref 3.81–5.12)
S EPIDERMIDIS DNA BLD POS QL NAA+NON-PRB: DETECTED
SODIUM SERPL-SCNC: 136 MMOL/L (ref 135–147)
T WAVE AXIS: 57 DEGREES
TACROLIMUS BLD-MCNC: 8.7 NG/ML (ref 3–15)
VENTRICULAR RATE: 107 BPM
WBC # BLD AUTO: 2.96 THOUSAND/UL (ref 4.31–10.16)

## 2024-10-25 PROCEDURE — 93010 ELECTROCARDIOGRAM REPORT: CPT | Performed by: INTERNAL MEDICINE

## 2024-10-25 PROCEDURE — 99232 SBSQ HOSP IP/OBS MODERATE 35: CPT

## 2024-10-25 PROCEDURE — 99233 SBSQ HOSP IP/OBS HIGH 50: CPT | Performed by: INTERNAL MEDICINE

## 2024-10-25 PROCEDURE — 80197 ASSAY OF TACROLIMUS: CPT

## 2024-10-25 PROCEDURE — 82948 REAGENT STRIP/BLOOD GLUCOSE: CPT

## 2024-10-25 PROCEDURE — 94640 AIRWAY INHALATION TREATMENT: CPT

## 2024-10-25 PROCEDURE — 94760 N-INVAS EAR/PLS OXIMETRY 1: CPT

## 2024-10-25 PROCEDURE — 85025 COMPLETE CBC W/AUTO DIFF WBC: CPT | Performed by: INTERNAL MEDICINE

## 2024-10-25 PROCEDURE — 80053 COMPREHEN METABOLIC PANEL: CPT | Performed by: INTERNAL MEDICINE

## 2024-10-25 RX ORDER — TACROLIMUS 0.5 MG/1
2 CAPSULE ORAL 2 TIMES DAILY
Status: DISCONTINUED | OUTPATIENT
Start: 2024-10-25 | End: 2024-10-25

## 2024-10-25 RX ORDER — ALPRAZOLAM 0.5 MG
1 TABLET ORAL
Status: DISCONTINUED | OUTPATIENT
Start: 2024-10-25 | End: 2024-10-26 | Stop reason: HOSPADM

## 2024-10-25 RX ORDER — INSULIN LISPRO 100 [IU]/ML
1-5 INJECTION, SOLUTION INTRAVENOUS; SUBCUTANEOUS
Status: DISCONTINUED | OUTPATIENT
Start: 2024-10-25 | End: 2024-10-26 | Stop reason: HOSPADM

## 2024-10-25 RX ORDER — TACROLIMUS 0.5 MG/1
2.5 CAPSULE ORAL 2 TIMES DAILY
Status: DISCONTINUED | OUTPATIENT
Start: 2024-10-25 | End: 2024-10-26 | Stop reason: HOSPADM

## 2024-10-25 RX ORDER — ACETAMINOPHEN 325 MG/1
975 TABLET ORAL EVERY 6 HOURS PRN
Status: DISCONTINUED | OUTPATIENT
Start: 2024-10-25 | End: 2024-10-26 | Stop reason: HOSPADM

## 2024-10-25 RX ORDER — PANTOPRAZOLE SODIUM 40 MG/1
40 TABLET, DELAYED RELEASE ORAL
Status: DISCONTINUED | OUTPATIENT
Start: 2024-10-25 | End: 2024-10-26 | Stop reason: HOSPADM

## 2024-10-25 RX ORDER — AZITHROMYCIN 500 MG/1
250 TABLET, FILM COATED ORAL 3 TIMES WEEKLY
Status: DISCONTINUED | OUTPATIENT
Start: 2024-10-28 | End: 2024-10-26 | Stop reason: HOSPADM

## 2024-10-25 RX ADMIN — TACROLIMUS 2.5 MG: 0.5 CAPSULE ORAL at 17:15

## 2024-10-25 RX ADMIN — PANTOPRAZOLE SODIUM 40 MG: 40 INJECTION, POWDER, FOR SOLUTION INTRAVENOUS at 10:17

## 2024-10-25 RX ADMIN — BUDESONIDE AND FORMOTEROL FUMARATE DIHYDRATE 2 PUFF: 160; 4.5 AEROSOL RESPIRATORY (INHALATION) at 17:16

## 2024-10-25 RX ADMIN — SODIUM CHLORIDE SOLN NEBU 3% 4 ML: 3 NEBU SOLN at 19:40

## 2024-10-25 RX ADMIN — INSULIN LISPRO 5 UNITS: 100 INJECTION, SOLUTION INTRAVENOUS; SUBCUTANEOUS at 21:30

## 2024-10-25 RX ADMIN — AZITHROMYCIN MONOHYDRATE 250 MG: 500 INJECTION, POWDER, LYOPHILIZED, FOR SOLUTION INTRAVENOUS at 05:39

## 2024-10-25 RX ADMIN — BUDESONIDE AND FORMOTEROL FUMARATE DIHYDRATE 2 PUFF: 160; 4.5 AEROSOL RESPIRATORY (INHALATION) at 05:42

## 2024-10-25 RX ADMIN — ATOVAQUONE 1500 MG: 750 SUSPENSION ORAL at 09:01

## 2024-10-25 RX ADMIN — INSULIN LISPRO 1 UNITS: 100 INJECTION, SOLUTION INTRAVENOUS; SUBCUTANEOUS at 17:16

## 2024-10-25 RX ADMIN — VALACYCLOVIR HYDROCHLORIDE 500 MG: 500 TABLET, FILM COATED ORAL at 08:58

## 2024-10-25 RX ADMIN — IRON SUCROSE 200 MG: 20 INJECTION, SOLUTION INTRAVENOUS at 09:07

## 2024-10-25 RX ADMIN — ALBUTEROL SULFATE 2.5 MG: 2.5 SOLUTION RESPIRATORY (INHALATION) at 07:16

## 2024-10-25 RX ADMIN — PREDNISONE 15 MG: 5 TABLET ORAL at 08:58

## 2024-10-25 RX ADMIN — DOXYCYCLINE 100 MG: 100 INJECTION, POWDER, LYOPHILIZED, FOR SOLUTION INTRAVENOUS at 00:07

## 2024-10-25 RX ADMIN — OXYBUTYNIN CHLORIDE 15 MG: 5 TABLET, EXTENDED RELEASE ORAL at 21:28

## 2024-10-25 RX ADMIN — DOXYCYCLINE 100 MG: 100 INJECTION, POWDER, LYOPHILIZED, FOR SOLUTION INTRAVENOUS at 11:07

## 2024-10-25 RX ADMIN — ENOXAPARIN SODIUM 50 MG: 60 INJECTION SUBCUTANEOUS at 08:58

## 2024-10-25 RX ADMIN — PANCRELIPASE 48000 UNITS: 120000; 24000; 76000 CAPSULE, DELAYED RELEASE PELLETS ORAL at 12:40

## 2024-10-25 RX ADMIN — DOXYCYCLINE 100 MG: 100 INJECTION, POWDER, LYOPHILIZED, FOR SOLUTION INTRAVENOUS at 23:00

## 2024-10-25 RX ADMIN — INSULIN LISPRO 1 UNITS: 100 INJECTION, SOLUTION INTRAVENOUS; SUBCUTANEOUS at 12:06

## 2024-10-25 RX ADMIN — ALBUTEROL SULFATE 2.5 MG: 2.5 SOLUTION RESPIRATORY (INHALATION) at 19:40

## 2024-10-25 RX ADMIN — PANTOPRAZOLE SODIUM 40 MG: 40 TABLET, DELAYED RELEASE ORAL at 17:16

## 2024-10-25 RX ADMIN — TACROLIMUS 2.5 MG: 0.5 CAPSULE ORAL at 08:57

## 2024-10-25 RX ADMIN — ALPRAZOLAM 1 MG: 0.5 TABLET ORAL at 18:44

## 2024-10-25 RX ADMIN — ENOXAPARIN SODIUM 50 MG: 60 INJECTION SUBCUTANEOUS at 21:29

## 2024-10-25 RX ADMIN — FAMOTIDINE 20 MG: 10 INJECTION, SOLUTION INTRAVENOUS at 21:29

## 2024-10-25 RX ADMIN — TAMSULOSIN HYDROCHLORIDE 0.4 MG: 0.4 CAPSULE ORAL at 17:16

## 2024-10-25 RX ADMIN — FUROSEMIDE 20 MG: 10 INJECTION, SOLUTION INTRAMUSCULAR; INTRAVENOUS at 10:17

## 2024-10-25 RX ADMIN — INSULIN GLARGINE 5 UNITS: 100 INJECTION, SOLUTION SUBCUTANEOUS at 08:58

## 2024-10-25 RX ADMIN — SODIUM CHLORIDE SOLN NEBU 3% 4 ML: 3 NEBU SOLN at 07:16

## 2024-10-25 RX ADMIN — ACETAMINOPHEN 975 MG: 325 TABLET ORAL at 12:06

## 2024-10-25 NOTE — ASSESSMENT & PLAN NOTE
Patient was seen by transplant provider on 10/10 and sputum cultures later isolated MRSA.  Had been started on minocycline given symptoms at the time.  Course now complicated by the above.  CT imaging without any acute infiltrate/parenchymal disease.  Case was discussed with transplant center by primary service and patient was recommended for ongoing IV doxycycline while admitted.  Plan to continue IV doxycycline until discharge per transplant center  Trend fever curve/vitals  Attempt reducing lab draws as above  Monitor for symptom improvement of the above

## 2024-10-25 NOTE — ASSESSMENT & PLAN NOTE
Patient found to have MRSA in her sputum per lung transplant team.  Was started on minocycline therapy however patient developed symptoms.  Currently on IV doxycycline.  Will discontinue upon discharge.  No need for oral therapy after discharge

## 2024-10-25 NOTE — ASSESSMENT & PLAN NOTE
Reportedly was acutely confused on presentation.  Overall improved.  CT head without acute findings.  Sinus disease noted on imaging which I suspect is not contributing.  Monitor mental status  Low threshold for repeat imaging of the head  Antibiotic as above  Trend fever curve/WBC

## 2024-10-25 NOTE — CASE MANAGEMENT
Case Management Discharge Planning Note    Patient name Maximilian Mann  Location /-01 MRN [id number]  : 1975 Date 10/25/2024       Current Admission Date: 10/21/2024  Current Admission Diagnosis:Nausea and vomiting   Patient Active Problem List    Diagnosis Date Noted Date Diagnosed    Pancytopenia, acquired ([facility]) 10/24/2024     Positive culture findings in sputum 10/23/2024     Positive blood culture 10/23/2024     SIRS (systemic inflammatory response syndrome) ([facility]) 10/23/2024     Hematemesis with nausea 10/22/2024     Toxic metabolic encephalopathy 10/22/2024     Electrolyte abnormality 2024     Sepsis ([facility]) 2024     Acute pyelonephritis 2024     Elevated serum creatinine 2024     History of pulmonary embolus (PE) 2024     History of DVT (deep vein thrombosis) 2024     Sore throat 2024     Vaginal yeast infection 2024     Intestinal metaplasia of antrum of stomach without dysplasia 2024     Hx of lung transplant ([facility]) 01/15/2024     Continuous opioid dependence ([facility]) 01/15/2024     Stage 3a chronic kidney disease (HCC) 2023     Depressed mood 2023     Chronic pancreatitis, unspecified pancreatitis type ([facility]) 2023     Pulmonary embolus (HCC) 2022     Right leg pain 10/13/2022     Chronic cough 2022     Renal calculus, left 2022     Pulmonary nodules 2022     Rib pain 2022     Gastroparesis 2022     Ulcerative pancolitis without complication (HCC) 2022     Neuropathic pain of right foot 01/10/2022     Weakness of right lower extremity 2021     Pulmonary hypertension (HCC) 2021     Cystic fibrosis (HCC) 2021     Essential hypertension 2021     Hypercholesteremia 2021     Long term current use of bisphosphonates 2020     Long term current use of systemic steroids 2020     Osteopenia 2020     Chronic pain disorder 2020      Asthma 03/06/2020     Nausea and vomiting 02/04/2020     Hiatal hernia 06/25/2019     Anemia 07/09/2018     DVT (deep venous thrombosis) ([facility]) 06/29/2018     Immunosuppression ([facility]) 03/12/2017     Colon polyp, personal history 07/18/2016     S/P pneumonectomy 03/07/2013     Anxiety 07/08/2012     Type 1 diabetes mellitus with diabetic chronic kidney disease, unspecified CKD stage ([facility]) 10/28/2010     Pancreatic insufficiency 10/28/2010     Chronic sinusitis 10/28/2010     GERD (gastroesophageal reflux disease) 10/28/2010       LOS (days): 3  Geometric Mean LOS (GMLOS) (days): 4.2  Days to GMLOS:0.6     OBJECTIVE:  Risk of Unplanned Readmission Score: 45.88         Current admission status: Inpatient   Preferred Pharmacy:   [facility] - [place], [place] - [address] [facility]  [address]  [facility] [address]  Phone: [phone] Fax: [phone]    [facility]/pharmacy #[id number] - [place] - [address]  [address]  [facility] [address]  Phone: [phone] Fax: [phone]    [facility]/pharmacy #[id number] - [place] - [address]  [address]  [facility] [place] [address]  Phone: [phone] Fax: [phone]    Primary Care Provider: Solis Avila MD    Primary Insurance: BLUE CROSS  Secondary Insurance: MEDICARE    DISCHARGE DETAILS:    Discharge planning discussed with:: Patient  Freedom of Choice: Yes  Comments - Freedom of Choice: CM discussed freedom of choice as it pertains to discharge planning. Patient is alert oriented and competent to make decisions. Introduced self and role, explained role of CM in discharge planning. CM discussed current living situation and needs at discharge. Patient declined DME and rehab. Patient agreeable to continuing services with [facility].  Pt is aware and encouraged to seek CM for any questions or concerns. CM continues to follow.  CM contacted family/caregiver?: No- see comments (declined)  Were Treatment  Team discharge recommendations reviewed with patient/caregiver?: Yes  Did patient/caregiver verbalize understanding of patient care needs?: Yes  Were patient/caregiver advised of the risks associated with not following Treatment Team discharge recommendations?: Yes         Requested Home Health Care         Is the patient interested in HHC at discharge?: Yes  Home Health Discipline requested:: Nursing  Home Health Agency Name:: [facility] Needed:: Diabetes Management (restart of care)  Homebound Criteria Met:: Immunosuppressed  Supporting Clincal Findings:: Limited Endurance, Fatigues Easliy in Short Distances    DME Referral Provided  Referral made for DME?: No    Other Referral/Resources/Interventions Provided:  Interventions: HHC  Referral Comments: [facility]    Would you like to participate in our [facility] service program?  : No - Declined    Treatment Team Recommendation: Home with Home Health Care  Discharge Destination Plan:: Home with Home Health Care  Transport at Discharge : Family

## 2024-10-25 NOTE — ASSESSMENT & PLAN NOTE
Patient has underlying gastroparesis, GERD which has been poorly controlled recently.  She had previous fundoplication, recent upper GI series has a sliding hiatal hernia.  He was most recently seen by outpatient provider and started on minocycline given upper respiratory symptoms and MRSA on sputum culture.  Symptoms began after starting medication.  Patient further clarified though that she was also off of her reflux medications leading up to her planned pH study/EGD.  Suspect that the patient had worsening of her esophagitis with minocycline and being off of her medications.  Lower suspicion for true fungal esophagitis or viral esophagitis  Will continue on IV doxycycline for now per transplant center  No further oral minocycline  No oral doxycycline at discharge  No additional antifungal  Will monitor for ongoing symptom improvement  Trend fever curve/vitals  Attempted reduce lab draws as above  GI evaluation appreciated  Additional supportive care as per primary  Follow-up with chronic providers after discharge

## 2024-10-25 NOTE — ASSESSMENT & PLAN NOTE
Noted to have declining cell counts which I suspect is volume related but also reactive to the inflammatory process in the esophagus as above as well as recent missed doses of medications including steroids.  Continue prophylactic therapies  Will attempt to reduce lab draws  Transfusional support per primary  Repeat labs again on Sunday, if admitted

## 2024-10-25 NOTE — PROGRESS NOTES
Progress Note - Infectious Disease   Name: Maximilian Mann 48 y.o. female I MRN: [id number]  Unit/Bed#: -01 I Date of Admission: 10/21/2024   Date of Service: 10/25/2024 I Hospital Day: 3     Assessment & Plan  SIRS (systemic inflammatory response syndrome) (HCC)  Patient was noted with tachycardia, leukocytosis and isolated episode of fever.  I suspect that this is related primarily to her GI symptoms with nausea/retching and likely a component of dehydration.  Suspect blood cultures below represent contaminant/colonization of her port pin.  CT imaging of the chest/abdomen/pelvis otherwise unremarkable.  CT of the head notable for sinus congestion which may be due to ongoing episodes of reflux and her recent consistent vomiting.  Patient offers otherwise no other localizing complaints.  Symptoms overall have improved.  Clinical parameters stable.  Antibiotic as below  Trend fever curve/vitals  We will attempt to reduce lab draws given improvement  Would plan to repeat CBC/chemistry again, if admitted on Sunday  Follow-up second set of blood culture for completeness, NGTD  Contacted office staff to follow-up final second set culture as outpatient for completeness  Will continue prophylactic medications as below  GI evaluation noted  Additional supportive cares per primary  Hopeful discharge home tomorrow if continues to do well.  Follow-up with chronic transplant providers after discharge    Nausea and vomiting  Patient has underlying gastroparesis, GERD which has been poorly controlled recently.  She had previous fundoplication, recent upper GI series has a sliding hiatal hernia.  He was most recently seen by outpatient provider and started on minocycline given upper respiratory symptoms and MRSA on sputum culture.  Symptoms began after starting medication.  Patient further clarified though that she was also off of her reflux medications leading up to her planned pH study/EGD.  Suspect that the patient  had worsening of her esophagitis with minocycline and being off of her medications.  Lower suspicion for true fungal esophagitis or viral esophagitis  Will continue on IV doxycycline for now per transplant center  No further oral minocycline  No oral doxycycline at discharge  No additional antifungal  Will monitor for ongoing symptom improvement  Trend fever curve/vitals  Attempted reduce lab draws as above  GI evaluation appreciated  Additional supportive care as per primary  Follow-up with chronic providers after discharge  Toxic metabolic encephalopathy  Reportedly was acutely confused on presentation.  Overall improved.  CT head without acute findings.  Sinus disease noted on imaging which I suspect is not contributing.  Monitor mental status  Low threshold for repeat imaging of the head  Antibiotic as above  Trend fever curve/WBC  Positive culture findings in sputum  Patient was seen by transplant provider on 10/10 and sputum cultures later isolated MRSA.  Had been started on minocycline given symptoms at the time.  Course now complicated by the above.  CT imaging without any acute infiltrate/parenchymal disease.  Case was discussed with transplant center by primary service and patient was recommended for ongoing IV doxycycline while admitted.  Plan to continue IV doxycycline until discharge per transplant center  Trend fever curve/vitals  Attempt reducing lab draws as above  Monitor for symptom improvement of the above  Gastroparesis  Known at baseline.  Suspect that this may be further contributing to the above.  Will continue IV therapies for now.  GI evaluation appreciated.  GERD (gastroesophageal reflux disease)  Noted to have poorly controlled disease at baseline.  Recent upper GI series as above.  No plans for EGD at this time per GI.  Will continue with care as above and monitor for symptom improvement.    Positive blood culture  1 out of 2 blood cultures from admission isolated coagulase-negative  staph.  Uncertain where this was collected from, if this was an earlier IV or possibly her port.  Peripheral blood culture however without any growth.  Suspect contaminant/colonization.  Maintain current port  Follow-up pending blood culture  No plans for repeat culture given other sources above  Trend fever curve/vitals  We will ask office staff to follow-up final blood culture outpatient  Will obtain repeat cultures if patient declines  Hx of lung transplant (HCC)  Patient previously underwent double lung transplant in 2013.  She required retransplant of the left lung in 2017.  Course was then complicated by need for right-sided pneumonectomy in 2022 given chronic progressive infections.  She remains on regimen with tacrolimus and prednisone.  Needs to follow with chronic providers.  Prophylactic medications and dosing reviewed.  Continue chronic atovaquone, Valtrex and azithromycin  Continue immunosuppressive therapy otherwise  Monitor closely for any drug drug interactions  Close follow-up with transplant center  Type 1 diabetes mellitus with diabetic chronic kidney disease, unspecified CKD stage ([facility])  A1c of 7.5.  Creatinine appears to be close to baseline.  Suspect hypoglycemia seen on admission likely contributing to some degree of dehydration along with patient's poor oral intake.  Creatinine can impact antibiotic dosing and I suspect is contributing to elevated Pro-Seth.  Will hold off on trending Pro-Seth further  Attempt to reduce further lab draws as above  Glucose management per primary    Pancytopenia, acquired ([facility])  Noted to have declining cell counts which I suspect is volume related but also reactive to the inflammatory process in the esophagus as above as well as recent missed doses of medications including steroids.  Continue prophylactic therapies  Will attempt to reduce lab draws  Transfusional support per primary  Repeat labs again on Sunday, if admitted    Above plan discussed in detail with  the patient at bedside  Above plan discussed in detail with primary service attending who is aware of plans for ongoing therapies, continued monitoring and reduced lab draws and clearance otherwise from an ID standpoint.    ID consult service will reevaluate this patient again over the weekend as needed.  Please call if questions in the interim.    Antibiotics:  Doxycycline 4  Prophylaxis ongoing    24 Hour events:  Yesterday and overnight notes reviewed and no acute events noted.    Subjective:  Patient reports overall symptoms seem to be slowly improving.  She was able to tolerate her medications by mouth.  She feels she may be ready to go home tomorrow.  We discussed cell counts remaining low but I suspect that this may be reactive given the abrupt changes to her medications and also ongoing inflammation and rebound reflux with esophagitis.  We discussed reducing lab draws for now while she remains admitted.  She is slowly expanding diet but again having issues given her baseline reflux and gastroparesis.  Additional questions answered.  Plans reviewed with primary service.  Additionally discussed with the patient my suspicion being low for candidal esophagitis based on timeline of improvement in [place] and so we discussed no plans for additional antifungal.    Objective:  Vitals:  Temp:  [97.7 °F (36.5 °C)-98.2 °F (36.8 °C)] 97.9 °F (36.6 °C)  HR:  [72-81] 77  BP: (123-140)/(70-77) 123/70  SpO2:  [95 %-99 %] 96 %  Temp (24hrs), Av.9 °F (36.6 °C), Min:97.7 °F (36.5 °C), Max:98.2 °F (36.8 °C)  Current: Temperature: 97.9 °F (36.6 °C)    Physical Exam:   General Appearance:  Alert, interactive, nontoxic, no acute distress.  Appears overall well compared to prior.  Slightly tremulous on exam which patient reports is typical for her.   Throat: Oropharynx moist without lesions.    Lungs:   Clear to auscultation, no wheezes or rales   Heart:  RRR; no murmur, rub or gallop noted   Abdomen:   Soft, non-tender,  non-distended, positive bowel sounds.     Extremities: No clubbing, cyanosis or edema   Skin: No new rashes or lesions. No new draining wounds noted.  Port site remains accessed at this time and patient is receiving IV iron.       Labs, Imaging, & Other studies:   All pertinent labs and imaging studies in PACS were personally reviewed as below.  Results from last 7 days   Lab Units 10/25/24  0537 10/24/24  0521 10/22/24  0423   WBC Thousand/uL 2.96* 3.29* 16.95*   HEMOGLOBIN g/dL 8.1* 7.7* 11.8   PLATELETS Thousands/uL 88* 71* 121*     Results from last 7 days   Lab Units 10/25/24  0537 10/24/24  0521 10/22/24  0423 10/21/24  2102   POTASSIUM mmol/L 4.5   < > 4.0 3.9   CHLORIDE mmol/L 105   < > 109* 99   CO2 mmol/L 27   < > 25 27   BUN mg/dL 15   < > 57* 68*   CREATININE mg/dL 1.30   < > 1.21 1.14   EGFR ml/min/1.73sq m 49   < > 53 57   CALCIUM mg/dL 8.0*   < > 7.5* 8.4   AST U/L 16  --  12* 14   ALT U/L 10  --  15 19   ALK PHOS U/L 53  --  63 65    < > = values in this interval not displayed.     Results from last 7 days   Lab Units 10/22/24  0106 10/22/24  0104   BLOOD CULTURE  No Growth at 72 hrs. Staphylococcus coagulase negative*   GRAM STAIN RESULT   --  Gram positive cocci in clusters*       Lab interpretation/comments: Cell counts remain low but may be starting to recover in terms of platelets and hemoglobin.    Imaging interpretation/comments: No new imaging    Culture data: Second set of blood cultures remain without growth    External notes: None

## 2024-10-25 NOTE — ASSESSMENT & PLAN NOTE
Patient was noted with tachycardia, leukocytosis and isolated episode of fever.  I suspect that this is related primarily to her GI symptoms with nausea/retching and likely a component of dehydration.  Suspect blood cultures below represent contaminant/colonization of her port pin.  CT imaging of the chest/abdomen/pelvis otherwise unremarkable.  CT of the head notable for sinus congestion which may be due to ongoing episodes of reflux and her recent consistent vomiting.  Patient offers otherwise no other localizing complaints.  Symptoms overall have improved.  Clinical parameters stable.  Antibiotic as below  Trend fever curve/vitals  We will attempt to reduce lab draws given improvement  Would plan to repeat CBC/chemistry again, if admitted on Sunday  Follow-up second set of blood culture for completeness, NGTD  Contacted office staff to follow-up final second set culture as outpatient for completeness  Will continue prophylactic medications as below  GI evaluation noted  Additional supportive cares per primary  Hopeful discharge home tomorrow if continues to do well.  Follow-up with chronic transplant providers after discharge

## 2024-10-25 NOTE — PLAN OF CARE
Problem: METABOLIC, FLUID AND ELECTROLYTES - ADULT  Goal: Electrolytes maintained within normal limits  Description: INTERVENTIONS:  - Monitor labs and assess patient for signs and symptoms of electrolyte imbalances  - Administer electrolyte replacement as ordered  - Monitor response to electrolyte replacements, including repeat lab results as appropriate  - Instruct patient on fluid and nutrition as appropriate  Outcome: Progressing  Goal: Fluid balance maintained  Description: INTERVENTIONS:  - Monitor labs   - Monitor I/O and WT  - Instruct patient on fluid and nutrition as appropriate  - Assess for signs & symptoms of volume excess or deficit  Outcome: Progressing  Goal: Glucose maintained within target range  Description: INTERVENTIONS:  - Monitor Blood Glucose as ordered  - Assess for signs and symptoms of hyperglycemia and hypoglycemia  - Administer ordered medications to maintain glucose within target range  - Assess nutritional intake and initiate nutrition service referral as needed  Outcome: Progressing     Problem: DISCHARGE PLANNING  Goal: Discharge to home or other facility with appropriate resources  Description: INTERVENTIONS:  - Identify barriers to discharge w/patient and caregiver  - Arrange for needed discharge resources and transportation as appropriate  - Identify discharge learning needs (meds, wound care, etc.)  - Arrange for interpretive services to assist at discharge as needed  - Refer to Case Management Department for coordinating discharge planning if the patient needs post-hospital services based on physician/advanced practitioner order or complex needs related to functional status, cognitive ability, or social support system  Outcome: Progressing

## 2024-10-25 NOTE — ASSESSMENT & PLAN NOTE
Unclear, will need to get more history from the family.  Patient identifies that she is confused and answers yes.  I anticipate is due to polypharmacy with her having Xanax, oxycodone, Lyrica and also Marinol. Those medications been discontinued  Questionable if supratherapeutic tacrolimus level pending tacrolimus blood work.  He has positive for opiates and oxycodone.  Patient known for taking oxycodone and naloxone as needed.  CT head showing Small extra-axial hyperdensity at the anterior aspect of the left middle cranial fossa. Possible adjacent osseous remodeling. Favor small meningioma with hemorrhage felt less likely.  MRI brain with no acute findings  Plan:  Patient improvement in mental status, back to her baseline  No need for transfer to [facility].  Patient likely will be discharged tomorrow

## 2024-10-25 NOTE — ASSESSMENT & PLAN NOTE
A1c of 7.5.  Creatinine appears to be close to baseline.  Suspect hypoglycemia seen on admission likely contributing to some degree of dehydration along with patient's poor oral intake.  Creatinine can impact antibiotic dosing and I suspect is contributing to elevated Pro-Seth.  Will hold off on trending Pro-Seth further  Attempt to reduce further lab draws as above  Glucose management per primary

## 2024-10-25 NOTE — ASSESSMENT & PLAN NOTE
Patient continues to have pancytopenia.  Per transplant team no need for treatment at this time.  Continue with IV Venofer given history of iron deficiency anemia.

## 2024-10-25 NOTE — PROGRESS NOTES
Progress Note - Hospitalist   Name: Maximilian Mann 48 y.o. female I MRN: [id number]  Unit/Bed#: MS [facility] I Date of Admission: 10/21/2024   Date of Service: 10/25/2024 I Hospital Day: 3    Assessment & Plan  Nausea and vomiting  Patient with nausea and vomiting and questionable hematemesis.  CT scan showing diffuse esophageal thickening and mucosal suggesting esophagitis.  GI team consulted and recommending medical management of nausea and vomiting.  Suspected gastroparesis in the setting of polypharmacy and altered mental status.    Plan:  Case discussed with infectious disease team, esophagitis suspected to be in the setting of minocycline versus less likely Candida esophagitis, per infectious disease micafungin discontinued.  Will continue PPI every 12 hours and Pepcid  Patient will need d GI follow-up in the outpatient setting with possible EGD  Is continued Zofran and Reglan given increased Qtc.  Ordered Tigan as needed for nausea    Toxic metabolic encephalopathy  Unclear, will need to get more history from the family.  Patient identifies that she is confused and answers yes.  I anticipate is due to polypharmacy with her having Xanax, oxycodone, Lyrica and also Marinol. Those medications been discontinued  Questionable if supratherapeutic tacrolimus level pending tacrolimus blood work.  He has positive for opiates and oxycodone.  Patient known for taking oxycodone and naloxone as needed.  CT head showing Small extra-axial hyperdensity at the anterior aspect of the left middle cranial fossa. Possible adjacent osseous remodeling. Favor small meningioma with hemorrhage felt less likely.  MRI brain with no acute findings  Plan:  Patient improvement in mental status, back to her baseline  No need for transfer to [facility].  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  Has had recurrent admissions for pneumonia and Pseudomonas/MSSA and status post pneumonectomy  Patient back to her slight mental status   Discussed case with lung transplant team at [facility] and no need for transfer at this time.  Resumed her immunosuppressive therapy with tacrolimus 2.5 mg twice a day and prednisone 50 mg daily  Last tacrolimus level less than 2.9.  Pending repeat level    GERD (gastroesophageal reflux disease)  History of severe GERD status post fundoplication in 2023  Noted esophagitis on CT suspected in the setting of pill esophagitis versus Candida esophagitis  Protonix for milligrams IV every 12 hours and famotidine    Type 1 diabetes mellitus with diabetic chronic kidney disease, unspecified CKD stage (HCC)  I am not clear what her home regimen is at this time.  That will need to be clarified  From the records it looks like she takes glargine 12 units in the morning, and NovoLog 10 each with breakfast, 8 units with lunch and 12 with dinner.    Will do glargine 5 units in the morning, insulin sliding scale every 6 hours as she is NPO.  Pancreatic insufficiency  Continue Creon when tolerating diet  Chronic pain disorder  Resumed oxycodone 10 mg given improvement in encephalopathy.  Patient reports pain in her lower back due to kidney stone.  History of DVT (deep vein thrombosis)  History of left lower extremity DVT on Eliquis.  Resume Eliquis given no concern for bleeding.  Gastroparesis  Started Reglan  Positive culture findings in sputum  Patient found to have MRSA in her sputum per lung transplant team.  Was started on minocycline therapy however patient developed symptoms.  Currently on IV doxycycline.  Will discontinue upon discharge.  No need for oral therapy after discharge  Positive blood culture  1 out of 2 culture growing Staph epidermidis,  Infectious disease  involved and likely contaminant.  No changes in the antibiotic and continue following  SIRS (systemic inflammatory response syndrome) (HCC)    Pancytopenia, acquired (HCC)  Patient continues to have pancytopenia.  Per transplant team no need for treatment at this time.  Continue with IV Venofer given history of iron deficiency anemia.  Hypokalemia (Resolved: 10/25/2024)  Patient with potassium of 2.9.  Repleted with IV potassium 40 mg x 2  Currently not tolerating oral potassium   continue repletion  Monitoring on telemetry given low potassium    VTE Pharmacologic Prophylaxis: VTE Score: 4 Moderate Risk (Score 3-4) - Pharmacological DVT Prophylaxis Ordered: enoxaparin (Lovenox).    Mobility:   Basic Mobility Inpatient Raw Score: 23  JH-HLM Goal: 7: Walk 25 feet or more  JH-HLM Achieved: 7: Walk 25 feet or more  JH-HLM Goal achieved. Continue to encourage appropriate mobility.    Patient Centered Rounds: I performed bedside rounds with nursing staff today.   Discussions with Specialists or Other Care Team Provider: Case discussed with infectious disease team and lung transplant at [facility]    Education and Discussions with Family / Patient: Updated  () via phone.    Current Length of Stay: 3 day(s)  Current Patient Status: Inpatient   Certification Statement: The patient will continue to require additional inpatient hospital stay due to abnormal blood cell counts, IV Venofer  Discharge Plan: Anticipate discharge tomorrow to home.    Code Status: Level 1 - Full Code    Subjective   Patient reports doing better in terms of tolerating diet.  Continues to have some nausea however started to tolerate the diet better.  She is able to keep down the medications.    Objective :  Temp:  [97.7 °F (36.5 °C)-98.2 °F (36.8 °C)] 97.9 °F (36.6 °C)  HR:  [72-81] 77  BP: (123-140)/(70-77) 123/70  SpO2:  [95 %-99 %] 96 %  O2 Device: None (Room air)    Body mass index is 20.69 kg/m².     Input and Output Summary  (last 24 hours):     Intake/Output Summary (Last 24 hours) at 10/25/2024 1328  Last data filed at 10/25/2024 1107  Gross per 24 hour   Intake 200 ml   Output 1550 ml   Net -1350 ml       Physical Exam  Vitals and nursing note reviewed.   Constitutional:       General: She is not in acute distress.     Appearance: She is well-developed.   HENT:      Head: Normocephalic and atraumatic.   Eyes:      Conjunctiva/sclera: Conjunctivae normal.   Cardiovascular:      Rate and Rhythm: Normal rate and regular rhythm.      Heart sounds: No murmur heard.  Pulmonary:      Effort: Pulmonary effort is normal. No respiratory distress.      Breath sounds: Normal breath sounds. No wheezing or rales.   Abdominal:      General: Bowel sounds are normal. There is no distension.      Palpations: Abdomen is soft.      Tenderness: There is no abdominal tenderness.   Musculoskeletal:         General: No swelling.      Cervical back: Neck supple.      Right lower leg: No edema.      Left lower leg: No edema.   Skin:     General: Skin is warm and dry.      Capillary Refill: Capillary refill takes less than 2 seconds.   Neurological:      Mental Status: She is alert. Mental status is at baseline.   Psychiatric:         Mood and Affect: Mood normal.           Lines/Drains:  Lines/Drains/Airways       Active Status       Name Placement date Placement time Site Days    Port A Cath 11/25/22 Right Chest 11/25/22  1201  Chest  700                    Central Line:  Goal for removal:  Chronic port               Lab Results: I have reviewed the following results:   Results from last 7 days   Lab Units 10/25/24  0537   WBC Thousand/uL 2.96*   HEMOGLOBIN g/dL 8.1*   HEMATOCRIT % 26.6*   PLATELETS Thousands/uL 88*   SEGS PCT % 52   LYMPHO PCT % 36   MONO PCT % 8   EOS PCT % 3     Results from last 7 days   Lab Units 10/25/24  0537   SODIUM mmol/L 136   POTASSIUM mmol/L 4.5   CHLORIDE mmol/L 105   CO2 mmol/L 27   BUN mg/dL 15   CREATININE mg/dL 1.30   ANION  GAP mmol/L 4   CALCIUM mg/dL 8.0*   ALBUMIN g/dL 2.6*   TOTAL BILIRUBIN mg/dL 0.31   ALK PHOS U/L 53   ALT U/L 10   AST U/L 16   GLUCOSE RANDOM mg/dL 100         Results from last 7 days   Lab Units 10/25/24  1157 10/25/24  0555 10/25/24  0005 10/24/24  2140 10/24/24  1211 10/24/24  0651 10/23/24  2334 10/23/24  1651 10/23/24  1146 10/23/24  0350 10/22/24  2244 10/22/24  1535   POC GLUCOSE mg/dl 171* 103 208* 263* 148* 108 131 153* 181* 125 200* 138         Results from last 7 days   Lab Units 10/24/24  0521 10/21/24  2102   LACTIC ACID mmol/L  --  1.9   PROCALCITONIN ng/ml 0.35* 0.37*       Recent Cultures (last 7 days):   Results from last 7 days   Lab Units 10/22/24  0106 10/22/24  0104   BLOOD CULTURE  No Growth at 72 hrs. Staphylococcus coagulase negative*   GRAM STAIN RESULT   --  Gram positive cocci in clusters*       Imaging Results Review: No pertinent imaging studies reviewed.  Other Study Results Review: No additional pertinent studies reviewed.    Last 24 Hours Medication List:     Current Facility-Administered Medications:     acetaminophen (TYLENOL) tablet 975 mg, Q6H PRN    albuterol inhalation solution 2.5 mg, BID    atovaquone (MEPRON) oral suspension 1,500 mg, Daily    [START ON 10/28/2024] azithromycin (ZITHROMAX) tablet 250 mg, Once per day on Monday Wednesday Friday    budesonide-formoterol (SYMBICORT) 160-4.5 mcg/act inhaler 2 puff, Q12H    doxycycline (VIBRAMYCIN) 100 mg in sodium chloride 0.9 % 100 mL IVPB, Q12H, Last Rate: 100 mg (10/25/24 1107)    enoxaparin (LOVENOX) subcutaneous injection 50 mg, Q12H DOTTY    Famotidine (PF) (PEPCID) injection 20 mg, Q24H DOTTY    furosemide (LASIX) injection 20 mg, Daily    insulin glargine (LANTUS) subcutaneous injection 5 Units 0.05 mL, QAM    insulin lispro (HumALOG/ADMELOG) 100 units/mL subcutaneous injection 1-5 Units, 4x Daily (AC & HS) **AND** Fingerstick Glucose (POCT), 4x Daily AC and at bedtime    iron sucrose (VENOFER) 200 mg in sodium chloride  0.9 % 100 mL IVPB, Daily, Last Rate: 200 mg (10/25/24 0907)    oxybutynin (DITROPAN-XL) 24 hr tablet 15 mg, HS    oxyCODONE (ROXICODONE) immediate release tablet 10 mg, Q8H PRN    pancrelipase (Lip-Prot-Amyl) (CREON) delayed release capsule 48,000 Units, TID With Meals    pantoprazole (PROTONIX) injection 40 mg, Q12H DOTTY    predniSONE tablet 15 mg, Daily    scopolamine (TRANSDERM-SCOP) 1 mg/3 days TD 72 hr patch 1 patch, Q72H    sodium chloride 3 % inhalation solution 4 mL, BID    tacrolimus (PROGRAF) capsule 2.5 mg, BID    tamsulosin (FLOMAX) capsule 0.4 mg, Daily With Dinner    trimethobenzamide (TIGAN) IM injection 200 mg, Q6H PRN    valACYclovir (VALTREX) tablet 500 mg, Daily        **Please Note: This note may have been constructed using a voice recognition system.**

## 2024-10-25 NOTE — RESPIRATORY THERAPY NOTE
10/25/24 1940   Respiratory Protocol   Protocol Initiated? No   Protocol Selection Respiratory   Language Barrier? No   Medical & Social History Reviewed? Yes   Diagnostic Studies Reviewed? Yes   Physical Assessment Performed? Yes   Respiratory Plan Home Bronchodilator Patient pathway   Respiratory Assessment   General Appearance Awake;Alert   Respiratory Pattern Normal   Chest Assessment Chest expansion symmetrical   Bilateral Breath Sounds Clear   Cough Non-productive;Dry;Strong   Resp Comments respirations are even and non labored, talkative with visitors   O2 Device room air   Additional Assessments   SpO2 96 %     Continue with scheduled therapy per home regimen

## 2024-10-25 NOTE — ASSESSMENT & PLAN NOTE
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  Has had recurrent admissions for pneumonia and Pseudomonas/MSSA and status post pneumonectomy  Patient back to her slight mental status   Discussed case with lung transplant team at [facility] and no need for transfer at this time.  Resumed her immunosuppressive therapy with tacrolimus 2.5 mg twice a day and prednisone 50 mg daily  Last tacrolimus level less than 2.9.  Pending repeat level

## 2024-10-25 NOTE — ASSESSMENT & PLAN NOTE
Patient with nausea and vomiting and questionable hematemesis.  CT scan showing diffuse esophageal thickening and mucosal suggesting esophagitis.  GI team consulted and recommending medical management of nausea and vomiting.  Suspected gastroparesis in the setting of polypharmacy and altered mental status.    Plan:  Case discussed with infectious disease team, esophagitis suspected to be in the setting of minocycline versus less likely Candida esophagitis, per infectious disease micafungin discontinued.  Will continue PPI every 12 hours and Pepcid  Patient will need d GI follow-up in the outpatient setting with possible EGD  Is continued Zofran and Reglan given increased Qtc.  Ordered Tigan as needed for nausea

## 2024-10-25 NOTE — ASSESSMENT & PLAN NOTE
1 out of 2 blood cultures from admission isolated coagulase-negative staph.  Uncertain where this was collected from, if this was an earlier IV or possibly her port.  Peripheral blood culture however without any growth.  Suspect contaminant/colonization.  Maintain current port  Follow-up pending blood culture  No plans for repeat culture given other sources above  Trend fever curve/vitals  We will ask office staff to follow-up final blood culture outpatient  Will obtain repeat cultures if patient declines

## 2024-10-26 VITALS
RESPIRATION RATE: 19 BRPM | HEART RATE: 78 BPM | OXYGEN SATURATION: 96 % | BODY MASS INDEX: 20.81 KG/M2 | DIASTOLIC BLOOD PRESSURE: 68 MMHG | SYSTOLIC BLOOD PRESSURE: 118 MMHG | HEIGHT: 62 IN | WEIGHT: 113.1 LBS | TEMPERATURE: 97.9 F

## 2024-10-26 PROBLEM — G92.8 TOXIC METABOLIC ENCEPHALOPATHY: Status: RESOLVED | Noted: 2024-10-22 | Resolved: 2024-10-26

## 2024-10-26 PROBLEM — K92.0 HEMATEMESIS WITH NAUSEA: Status: RESOLVED | Noted: 2024-10-22 | Resolved: 2024-10-26

## 2024-10-26 LAB
GLUCOSE SERPL-MCNC: 135 MG/DL (ref 65–140)
GLUCOSE SERPL-MCNC: 94 MG/DL (ref 65–140)

## 2024-10-26 PROCEDURE — 94760 N-INVAS EAR/PLS OXIMETRY 1: CPT

## 2024-10-26 PROCEDURE — 99239 HOSP IP/OBS DSCHRG MGMT >30: CPT

## 2024-10-26 PROCEDURE — 82948 REAGENT STRIP/BLOOD GLUCOSE: CPT

## 2024-10-26 PROCEDURE — 94664 DEMO&/EVAL PT USE INHALER: CPT

## 2024-10-26 PROCEDURE — 94640 AIRWAY INHALATION TREATMENT: CPT

## 2024-10-26 RX ORDER — HEPARIN 100 UNIT/ML
300 SYRINGE INTRAVENOUS ONCE
Status: COMPLETED | OUTPATIENT
Start: 2024-10-26 | End: 2024-10-26

## 2024-10-26 RX ADMIN — IRON SUCROSE 200 MG: 20 INJECTION, SOLUTION INTRAVENOUS at 09:09

## 2024-10-26 RX ADMIN — PANTOPRAZOLE SODIUM 40 MG: 40 TABLET, DELAYED RELEASE ORAL at 09:12

## 2024-10-26 RX ADMIN — PANCRELIPASE 48000 UNITS: 120000; 24000; 76000 CAPSULE, DELAYED RELEASE PELLETS ORAL at 09:10

## 2024-10-26 RX ADMIN — TACROLIMUS 2.5 MG: 0.5 CAPSULE ORAL at 09:07

## 2024-10-26 RX ADMIN — SODIUM CHLORIDE SOLN NEBU 3% 4 ML: 3 NEBU SOLN at 07:13

## 2024-10-26 RX ADMIN — FUROSEMIDE 20 MG: 10 INJECTION, SOLUTION INTRAMUSCULAR; INTRAVENOUS at 09:08

## 2024-10-26 RX ADMIN — INSULIN GLARGINE 5 UNITS: 100 INJECTION, SOLUTION SUBCUTANEOUS at 09:07

## 2024-10-26 RX ADMIN — ALBUTEROL SULFATE 2.5 MG: 2.5 SOLUTION RESPIRATORY (INHALATION) at 07:13

## 2024-10-26 RX ADMIN — PREDNISONE 15 MG: 5 TABLET ORAL at 09:07

## 2024-10-26 RX ADMIN — OXYCODONE HYDROCHLORIDE 10 MG: 10 TABLET ORAL at 01:43

## 2024-10-26 RX ADMIN — VALACYCLOVIR HYDROCHLORIDE 500 MG: 500 TABLET, FILM COATED ORAL at 09:07

## 2024-10-26 RX ADMIN — ENOXAPARIN SODIUM 50 MG: 60 INJECTION SUBCUTANEOUS at 09:08

## 2024-10-26 RX ADMIN — ATOVAQUONE 1500 MG: 750 SUSPENSION ORAL at 09:09

## 2024-10-26 RX ADMIN — HEPARIN 300 UNITS: 100 SYRINGE at 13:19

## 2024-10-26 RX ADMIN — BUDESONIDE AND FORMOTEROL FUMARATE DIHYDRATE 2 PUFF: 160; 4.5 AEROSOL RESPIRATORY (INHALATION) at 05:49

## 2024-10-26 NOTE — PLAN OF CARE
Problem: Prexisting or High Potential for Compromised Skin Integrity  Goal: Skin integrity is maintained or improved  Description: INTERVENTIONS:  - Identify patients at risk for skin breakdown  - Assess and monitor skin integrity  - Assess and monitor nutrition and hydration status  - Monitor labs   - Assess for incontinence   - Turn and reposition patient  - Assist with mobility/ambulation  - Relieve pressure over bony prominences  - Avoid friction and shearing  - Provide appropriate hygiene as needed including keeping skin clean and dry  - Evaluate need for skin moisturizer/barrier cream  - Collaborate with interdisciplinary team   - Patient/family teaching  - Consider wound care consult   Outcome: Progressing     Problem: METABOLIC, FLUID AND ELECTROLYTES - ADULT  Goal: Electrolytes maintained within normal limits  Description: INTERVENTIONS:  - Monitor labs and assess patient for signs and symptoms of electrolyte imbalances  - Administer electrolyte replacement as ordered  - Monitor response to electrolyte replacements, including repeat lab results as appropriate  - Instruct patient on fluid and nutrition as appropriate  Outcome: Progressing  Goal: Fluid balance maintained  Description: INTERVENTIONS:  - Monitor labs   - Monitor I/O and WT  - Instruct patient on fluid and nutrition as appropriate  - Assess for signs & symptoms of volume excess or deficit  Outcome: Progressing  Goal: Glucose maintained within target range  Description: INTERVENTIONS:  - Monitor Blood Glucose as ordered  - Assess for signs and symptoms of hyperglycemia and hypoglycemia  - Administer ordered medications to maintain glucose within target range  - Assess nutritional intake and initiate nutrition service referral as needed  Outcome: Progressing     Problem: GASTROINTESTINAL - ADULT  Goal: Minimal or absence of nausea and/or vomiting  Description: INTERVENTIONS:  - Administer IV fluids if ordered to ensure adequate hydration  -  Maintain NPO status until nausea and vomiting are resolved  - Nasogastric tube if ordered  - Administer ordered antiemetic medications as needed  - Provide nonpharmacologic comfort measures as appropriate  - Advance diet as tolerated, if ordered  - Consider nutrition services referral to assist patient with adequate nutrition and appropriate food choices  Outcome: Progressing  Goal: Maintains or returns to baseline bowel function  Description: INTERVENTIONS:  - Assess bowel function  - Encourage oral fluids to ensure adequate hydration  - Administer IV fluids if ordered to ensure adequate hydration  - Administer ordered medications as needed  - Encourage mobilization and activity  - Consider nutritional services referral to assist patient with adequate nutrition and appropriate food choices  Outcome: Progressing  Goal: Maintains adequate nutritional intake  Description: INTERVENTIONS:  - Monitor percentage of each meal consumed  - Identify factors contributing to decreased intake, treat as appropriate  - Assist with meals as needed  - Monitor I&O, weight, and lab values if indicated  - Obtain nutrition services referral as needed  Outcome: Progressing  Goal: Establish and maintain optimal ostomy function  Description: INTERVENTIONS:  - Assess bowel function  - Encourage oral fluids to ensure adequate hydration  - Administer IV fluids if ordered to ensure adequate hydration   - Administer ordered medications as needed  - Encourage mobilization and activity  - Nutrition services referral to assist patient with appropriate food choices  - Assess stoma site  - Consider wound care consult   Outcome: Progressing  Goal: Oral mucous membranes remain intact  Description: INTERVENTIONS  - Assess oral mucosa and hygiene practices  - Implement preventative oral hygiene regimen  - Implement oral medicated treatments as ordered  - Initiate Nutrition services referral as needed  Outcome: Progressing     Problem: PAIN -  ADULT  Goal: Verbalizes/displays adequate comfort level or baseline comfort level  Description: Interventions:  - Encourage patient to monitor pain and request assistance  - Assess pain using appropriate pain scale  - Administer analgesics based on type and severity of pain and evaluate response  - Implement non-pharmacological measures as appropriate and evaluate response  - Consider cultural and social influences on pain and pain management  - Notify physician/advanced practitioner if interventions unsuccessful or patient reports new pain  Outcome: Progressing

## 2024-10-26 NOTE — ASSESSMENT & PLAN NOTE
Unclear, will need to get more history from the family.  Patient identifies that she is confused and answers yes.    He has positive for opiates and oxycodone.  Patient known for taking oxycodone and naloxone as needed.  CT head showing Small extra-axial hyperdensity at the anterior aspect of the left middle cranial fossa. Possible adjacent osseous remodeling. Favor small meningioma with hemorrhage felt less likely.  MRI brain with no acute findings  Plan:  Can resume home medication given that the metabolic encephalopathy was in the setting of dehydration, possible supratherapeutic tacrolimus given elevated level on admission.  Recommended follow-up with the primary care doctor.

## 2024-10-26 NOTE — RESPIRATORY THERAPY NOTE
"RT Protocol Note  Maximilian FLOR Mann 48 y.o. female MRN: [id number]  Unit/Bed#: -01 Encounter: [id number]    Assessment    Principal Problem:    Nausea and vomiting  Active Problems:    Type 1 diabetes mellitus with diabetic chronic kidney disease, unspecified CKD stage (HCC)    Pancreatic insufficiency    Chronic pain disorder    GERD (gastroesophageal reflux disease)    Gastroparesis    Hx of lung transplant (HCC)    History of DVT (deep vein thrombosis)    Hematemesis with nausea    Toxic metabolic encephalopathy    Positive culture findings in sputum    Positive blood culture    SIRS (systemic inflammatory response syndrome) (HCC)    Pancytopenia, acquired (HCC)      Physical Exam:   Assessment Type: Post-treatment  General Appearance: Alert, Awake  Respiratory Pattern: Normal  Chest Assessment: Chest expansion symmetrical  Bilateral Breath Sounds: Clear, Diminished  O2 Device: ra    Vitals:  Blood pressure 118/68, pulse 78, temperature 97.9 °F (36.6 °C), resp. rate 19, height 5' 2\" (1.575 m), weight 51.3 kg (113 lb 1.5 oz), SpO2 96%, not currently breastfeeding.      O2 Device: ra     Plan    Respiratory Plan: Home Bronchodilator Patient pathway        Resp Comments: will cont as ordered, no acute changes   "

## 2024-10-26 NOTE — ASSESSMENT & PLAN NOTE
I am not clear what her home regimen is at this time.  That will need to be clarified  From the records it looks like she takes glargine 12 units in the morning, and NovoLog 10 each with breakfast, 8 units with lunch and 12 with dinner.    Resume home regimen.

## 2024-10-26 NOTE — ASSESSMENT & PLAN NOTE
Patient with nausea and hematemesis  Patient resume diet and tolerating well.  Patient has iron deficiency anemia at baseline however no further drop in hemoglobin.   Attending Attestation (For Attendings USE Only)...

## 2024-10-26 NOTE — ASSESSMENT & PLAN NOTE
Losartan Potassium 50 MG Oral Tablet  Last given #90 with 0 refills on 1/3/22.    Last OV 03/17/22.    No appt scheduled.     Meds on med tab pending your approval.  Set to send to pharmacy via e-prescribe if approved.   Resumed oxycodone 10 mg given improvement in encephalopathy.  Patient reports pain in her lower back due to kidney stone.

## 2024-10-26 NOTE — PLAN OF CARE
Problem: Prexisting or High Potential for Compromised Skin Integrity  Goal: Skin integrity is maintained or improved  Description: INTERVENTIONS:  - Identify patients at risk for skin breakdown  - Assess and monitor skin integrity  - Assess and monitor nutrition and hydration status  - Monitor labs   - Assess for incontinence   - Turn and reposition patient  - Assist with mobility/ambulation  - Relieve pressure over bony prominences  - Avoid friction and shearing  - Provide appropriate hygiene as needed including keeping skin clean and dry  - Evaluate need for skin moisturizer/barrier cream  - Collaborate with interdisciplinary team   - Patient/family teaching  - Consider wound care consult   Outcome: Progressing     Problem: METABOLIC, FLUID AND ELECTROLYTES - ADULT  Goal: Electrolytes maintained within normal limits  Description: INTERVENTIONS:  - Monitor labs and assess patient for signs and symptoms of electrolyte imbalances  - Administer electrolyte replacement as ordered  - Monitor response to electrolyte replacements, including repeat lab results as appropriate  - Instruct patient on fluid and nutrition as appropriate  Outcome: Progressing

## 2024-10-26 NOTE — ASSESSMENT & PLAN NOTE
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  Has had recurrent admissions for pneumonia and Pseudomonas/MSSA and status post pneumonectomy  Patient back to her slight mental status   Discussed case with lung transplant team at [facility] and no need for transfer at this time.  Plan:  Repeated tacrolimus level 8.7.  Patient reports that he is taking tacrolimus 2.5 mg a day twice a day 12 mg prednisone daily  Patient to follow-up with her lung transplant team at [facility].

## 2024-10-26 NOTE — ASSESSMENT & PLAN NOTE
Patient with nausea and vomiting and questionable hematemesis.  CT scan showing diffuse esophageal thickening and mucosal suggesting esophagitis.  GI team consulted and recommending medical management of nausea and vomiting.  Suspected gastroparesis in the setting of polypharmacy and altered mental status.  Case discussed with infectious disease team, esophagitis suspected to be in the setting of minocycline     Plan:  Recommended to continue with PPI and Pepcid upon discharge  Patient will need d GI follow-up in the outpatient setting with possible EGD  Patient to follow-up with her primary care doctor

## 2024-10-26 NOTE — DISCHARGE SUMMARY
Discharge Summary - Hospitalist   Name: Maximilian Mann 48 y.o. female I MRN: [id number]  Unit/Bed#: -01 I Date of Admission: 10/21/2024   Date of Service: 10/26/2024 I Hospital Day: 4     Assessment & Plan  Nausea and vomiting  Patient with nausea and vomiting and questionable hematemesis.  CT scan showing diffuse esophageal thickening and mucosal suggesting esophagitis.  GI team consulted and recommending medical management of nausea and vomiting.  Suspected gastroparesis in the setting of polypharmacy and altered mental status.  Case discussed with infectious disease team, esophagitis suspected to be in the setting of minocycline     Plan:  Recommended to continue with PPI and Pepcid upon discharge  Patient will need d GI follow-up in the outpatient setting with possible EGD  Patient to follow-up with her primary care doctor    Toxic metabolic encephalopathy (Resolved: 10/26/2024)  Unclear, will need to get more history from the family.  Patient identifies that she is confused and answers yes.    He has positive for opiates and oxycodone.  Patient known for taking oxycodone and naloxone as needed.  CT head showing Small extra-axial hyperdensity at the anterior aspect of the left middle cranial fossa. Possible adjacent osseous remodeling. Favor small meningioma with hemorrhage felt less likely.  MRI brain with no acute findings  Plan:  Can resume home medication given that the metabolic encephalopathy was in the setting of dehydration, possible supratherapeutic tacrolimus given elevated level on admission.  Recommended follow-up with the primary care doctor.    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  Has had recurrent admissions for pneumonia and Pseudomonas/MSSA and status post  pneumonectomy  Patient back to her slight mental status   Discussed case with lung transplant team at [facility] and no need for transfer at this time.  Plan:  Repeated tacrolimus level 8.7.  Patient reports that he is taking tacrolimus 2.5 mg a day twice a day 12 mg prednisone daily  Patient to follow-up with her lung transplant team at [facility].    GERD (gastroesophageal reflux disease)  History of severe GERD status post fundoplication in 2023  Noted esophagitis on CT suspected in the setting of pill esophagitis versus Candida esophagitis  Continue with PPI and famotidine at discharge.    Type 1 diabetes mellitus with diabetic chronic kidney disease, unspecified CKD stage ([facility])  I am not clear what her home regimen is at this time.  That will need to be clarified  From the records it looks like she takes glargine 12 units in the morning, and NovoLog 10 each with breakfast, 8 units with lunch and 12 with dinner.    Resume home regimen.  Pancreatic insufficiency  Continue Creon  Chronic pain disorder  Resumed oxycodone 10 mg given improvement in encephalopathy.  Patient reports pain in her lower back due to kidney stone.  History of DVT (deep vein thrombosis)  History of left lower extremity DVT on Eliquis.  Resume Eliquis given no concern for bleeding.  Gastroparesis  Follow-up with the GI team in the outpatient setting.  Positive culture findings in sputum  Patient found to have MRSA in her sputum per lung transplant team.  Was started on minocycline therapy however patient developed symptoms.  Currently on IV doxycycline.  Will discontinue upon discharge.  No need for oral therapy after discharge  Positive blood culture  1 out of 2 culture growing Staph epidermidis,  Infectious disease involved and likely contaminant.  No changes in the antibiotic and continue following  SIRS (systemic inflammatory response syndrome) ([facility])    Pancytopenia, acquired ([facility])  Patient continues to have pancytopenia.  Per transplant  team no need for treatment at this time.  Patient received 3 days of IV  Venofer  Unfortunately unable to collect a.m. blood work for today given blocked port.    Patient will do blood work tomorrow morning as she gets medical staff at home that takes care of her port.  Hematemesis with nausea (Resolved: 10/26/2024)  Patient with nausea and hematemesis  Patient resume diet and tolerating well.  Patient has iron deficiency anemia at baseline however no further drop in hemoglobin.     Medical Problems       Resolved Problems  Date Reviewed: 10/1/2024            Resolved    Hematemesis with nausea 10/26/2024     Resolved by  Anna Coats MD    Toxic metabolic encephalopathy 10/26/2024     Resolved by  Anna Coats MD    Hypokalemia 10/25/2024     Resolved by  Anna Coats MD        Discharging Physician / Practitioner: Anna Coats MD  PCP: Solis Avila MD  Admission Date:   Admission Orders (From admission, onward)       Ordered        10/22/24 0055  INPATIENT ADMISSION  Once                          Discharge Date: 10/26/24    Consultations During Hospital Stay:  Gastroenterology and infectious disease    Procedures Performed:   none    Significant Findings / Test Results:   MRI brain wo contrast   Final Result by Houston Pryor MD (10/23 0123)      No evidence of acute infarct, acute intracranial hemorrhage or mass.      Workstation performed: [id number]         CT head without contrast   Final Result by Nishant Saucedo MD (10/22 0805)      Small extra-axial hyperdensity at the anterior aspect of the left middle cranial fossa. Possible adjacent osseous remodeling. Favor small meningioma with hemorrhage felt less likely. Recommend short interval follow-up CT versus characterization with    brain MRI.            This represents a change from the preliminary report.   I personally discussed this study with Anna Coats on 10/22/2024 8:04 AM.            Workstation performed: [id number]          CT chest abdomen pelvis w contrast   Final Result by Nishant Saucedo MD (10/22 0718)      1.  Diffuse esophageal wall thickening and mucosal hyperenhancement suggests esophagitis.   2.  Rectosigmoid colonic wall prominence without inflammation may be due to underdistention but may be correlated for evidence of colitis. Constipation.   3.  Ancillary findings as above.            Findings in agreement with preliminary report.      Workstation performed: [id number]         XR chest 1 view portable   Final Result by Howie Kemp MD (10/22 0819)   Stable postoperative change after right pneumonectomy with complete opacification right hemithorax and rightward mediastinal shift. Stable appearance to the transplanted left lung.         Workstation performed: [id number]            Results Reviewed       Procedure Component Value Units Date/Time    Blood culture #1 [[id number]] Collected: 10/22/24 0106    Lab Status: Preliminary result Specimen: Blood from Arm, Left Updated: 10/26/24 0801     Blood Culture No Growth After 4 Days.    Blood culture #2 [[id number]]  (Abnormal) Collected: 10/22/24 0104    Lab Status: Final result Specimen: Blood from Central Venous Line Updated: 10/25/24 0917     Blood Culture Staphylococcus coagulase negative     Gram Stain Result Gram positive cocci in clusters    Narrative:      Susceptibility testing will not be performed as this organism, when isolated from a single set of blood cultures, represents probable skin kalpana contamination. Please call the Microbiology Laboratory within 5 days at [phone] if further workup is required.      Blood Culture Identification Panel [[id number]]  (Abnormal) Collected: 10/22/24 0104    Lab Status: Final result Specimen: Blood from Central Venous Line Updated: 10/25/24 0917     Staphylococcus epidermidis Detected     mecA/C (Methicillin-resistance gene) Detected    Narrative:      Film Array panel tests for 11 gram positive organisms, 15 gram negative  organisms, 7 yeast species and 10 resistance genes.     Tacrolimus level [[id number]]  (Normal) Collected: 10/21/24 2102    Lab Status: Final result Specimen: Blood from Central Venous Line Updated: 10/22/24 2155     TACROLIMUS 12.7 ng/mL     HS Troponin I 4hr [[id number]]  (Normal) Collected: 10/22/24 0104    Lab Status: Final result Specimen: Blood from Central Venous Line Updated: 10/22/24 0137     hs TnI 4hr 23 ng/L      Delta 4hr hsTnI 6 ng/L     Fingerstick Glucose (POCT) [[id number]]  (Abnormal) Collected: 10/22/24 0051    Lab Status: Final result Specimen: Blood Updated: 10/22/24 0053     POC Glucose 292 mg/dl     HS Troponin I 2hr [[id number]]  (Normal) Collected: 10/21/24 2357    Lab Status: Final result Specimen: Blood from Central Venous Line Updated: 10/22/24 0023     hs TnI 2hr 20 ng/L      Delta 2hr hsTnI 3 ng/L     UA w Reflex to Microscopic w Reflex to Culture [[id number]]  (Abnormal) Collected: 10/21/24 2352    Lab Status: Final result Specimen: Urine, Clean Catch Updated: 10/21/24 2357     Color, UA Light Yellow     Clarity, UA Clear     Specific Gravity, UA 1.046     pH, UA 6.0     Leukocytes, UA Negative     Nitrite, UA Negative     Protein, UA Negative mg/dl      Glucose, UA 1000 (1%) mg/dl      Ketones, UA Negative mg/dl      Urobilinogen, UA <2.0 mg/dl      Bilirubin, UA Negative     Occult Blood, UA Negative    COVID/FLU/RSV [[id number]]  (Normal) Collected: 10/21/24 2107    Lab Status: Final result Specimen: Nares from Nose Updated: 10/21/24 2202     SARS-CoV-2 Negative     INFLUENZA A PCR Negative     INFLUENZA B PCR Negative     RSV PCR Negative    Narrative:      This test has been performed using the CoV-2/Flu/RSV plus assay on the [employer] GeneXpert platform. This test has been validated by the  and verified by the performing laboratory.     This test is designed to amplify and detect the following: nucleocapsid (N), envelope (E), and RNA-dependent RNA polymerase (RdRP) genes of  the SARS-CoV-2 genome; matrix (M), basic polymerase (PB2), and acidic protein (PA) segments of the influenza A genome; matrix (M) and non-structural protein (NS) segments of the influenza B genome, and the nucleocapsid genes of RSV A and RSV B.     Positive results are indicative of the presence of Flu A, Flu B, RSV, and/or SARS-CoV-2 RNA. Positive results for SARS-CoV-2 or suspected novel influenza should be reported to state, local, or federal health departments according to local reporting requirements.      All results should be assessed in conjunction with clinical presentation and other laboratory markers for clinical management.     FOR PEDIATRIC PATIENTS - copy/paste COVID Guidelines URL to browser: [email or web address]       Procalcitonin [[id number]]  (Abnormal) Collected: 10/21/24 2102    Lab Status: Final result Specimen: Blood from Arm, Left Updated: 10/21/24 2138     Procalcitonin 0.37 ng/ml     Comprehensive metabolic panel [[id number]]  (Abnormal) Collected: 10/21/24 2102    Lab Status: Final result Specimen: Blood from Central Venous Line Updated: 10/21/24 2130     Sodium 134 mmol/L      Potassium 3.9 mmol/L      Chloride 99 mmol/L      CO2 27 mmol/L      ANION GAP 8 mmol/L      BUN 68 mg/dL      Creatinine 1.14 mg/dL      Glucose 448 mg/dL      Calcium 8.4 mg/dL      Corrected Calcium 9.0 mg/dL      AST 14 U/L      ALT 19 U/L      Alkaline Phosphatase 65 U/L      Total Protein 6.4 g/dL      Albumin 3.3 g/dL      Total Bilirubin 0.72 mg/dL      eGFR 57 ml/min/1.73sq m     Narrative:      National Kidney Disease Foundation guidelines for Chronic Kidney Disease (CKD):     Stage 1 with normal or high GFR (GFR > 90 mL/min/1.73 square meters)    Stage 2 Mild CKD (GFR = 60-89 mL/min/1.73 square meters)    Stage 3A Moderate CKD (GFR = 45-59 mL/min/1.73 square meters)    Stage 3B Moderate CKD (GFR = 30-44 mL/min/1.73 square meters)    Stage 4 Severe CKD  (GFR = 15-29 mL/min/1.73 square meters)    Stage 5 End Stage CKD (GFR <15 mL/min/1.73 square meters)  Note: GFR calculation is accurate only with a steady state creatinine    HS Troponin 0hr (reflex protocol) [[id number]]  (Normal) Collected: 10/21/24 2102    Lab Status: Final result Specimen: Blood from Central Venous Line Updated: 10/21/24 2130     hs TnI 0hr 17 ng/L     Lipase [[id number]]  (Abnormal) Collected: 10/21/24 2102    Lab Status: Final result Specimen: Blood from Central Venous Line Updated: 10/21/24 2124     Lipase <6 u/L     Beta Hydroxybutyrate [[id number]]  (Normal) Collected: 10/21/24 2102    Lab Status: Final result Specimen: Blood from Arm, Left Updated: 10/21/24 2124     Beta- Hydroxybutyrate 0.27 mmol/L     Lactic acid, plasma (w/reflex if result > 2.0) [[id number]]  (Normal) Collected: 10/21/24 2102    Lab Status: Final result Specimen: Blood from Central Venous Line Updated: 10/21/24 2122     LACTIC ACID 1.9 mmol/L     Narrative:      Result may be elevated if tourniquet was used during collection.    Blood gas, venous [[id number]]  (Abnormal) Collected: 10/21/24 2102    Lab Status: Final result Specimen: Blood from Line, Venous Updated: 10/21/24 2109     pH, Melvin 7.453     pCO2, Melvin 34.5 mm Hg      pO2, Melvin 34.2 mm Hg      HCO3, Melvin 23.6 mmol/L      Base Excess, Melvin 0.2 mmol/L      O2 Content, Melvin 12.2 ml/dL      O2 HGB, VENOUS 63.7 %     CBC and differential [[id number]]  (Abnormal) Collected: 10/21/24 2102    Lab Status: Final result Specimen: Blood from Central Venous Line Updated: 10/21/24 2109     WBC 20.92 Thousand/uL      RBC 4.89 Million/uL      Hemoglobin 12.9 g/dL      Hematocrit 42.7 %      MCV 87 fL      MCH 26.4 pg      MCHC 30.2 g/dL      RDW 14.4 %      MPV 12.1 fL      Platelets 142 Thousands/uL      nRBC 0 /100 WBCs      Segmented % 52 %      Immature Grans % 0 %      Lymphocytes % 36 %      Monocytes % 11 %      Eosinophils Relative 1 %      Basophils Relative 0 %      Absolute  Neutrophils 10.69 Thousands/µL      Absolute Immature Grans 0.09 Thousand/uL      Absolute Lymphocytes 7.58 Thousands/µL      Absolute Monocytes 2.27 Thousand/µL      Eosinophils Absolute 0.22 Thousand/µL      Basophils Absolute 0.07 Thousands/µL     Fingerstick Glucose (POCT) [[id number]]  (Abnormal) Collected: 10/21/24 2048    Lab Status: Final result Specimen: Blood Updated: 10/21/24 2049     POC Glucose 406 mg/dl            Incidental Findings:   no    Test Results Pending at Discharge (will require follow up):   no     Outpatient Tests Requested:  Patient will need CBC and BMP done outpatient.    Complications:  none    Reason for Admission: Altered mental status    Hospital Course:   Maximilian Mann is a 48 y.o. female patient who originally presented to the hospital on 10/21/2024 due to altered mental status in the setting of nausea and vomiting.  In the ED patient underwent CT head that showed no intracranial abnormalities.  CT chest abdomen and pelvis showed signs consistent with acute esophagitis, patient was made n.p.o. IV fluids were started and symptomatic management with IV Zofran and Reglan was done.  GI team was consulted that recommended medical management at this time with outpatient follow-up and possible EGD.  Infectious disease was also consulted given patient's history of immunosuppression and 1 out of 2 blood culture coming back positive for Staph epidermidis, likely contaminant no further treatment regarding that.  Given the patient was treated with minocycline for history of MRSA colonization in the sputum before admission, was suspected that the medication cause pill induced esophagitis and induced patient symptoms of nausea and vomiting, patient was transitioned to IV doxycycline.  On discharge antibiotics were discontinued and no more oral antibiotics were recommended.    While being here the medical team for lung transplant Demetria was contacted and initially was planned for  "transfer to [facility] however transfer pended for several days and patient by that time improved and the transfer was canceled.  All the recommendations for regarding transplant medication was discussed over the phone with Dr.Ayah Ndiaye.    Please see above list of diagnoses and related plan for additional information.     Condition at Discharge: good    Discharge Day Visit / Exam:   Subjective: Reports improvement some oral intake however not completely back to her baseline.  Vitals: Blood Pressure: 118/68 (10/26/24 0800)  Pulse: 78 (10/26/24 0756)  Temperature: 97.9 °F (36.6 °C) (10/26/24 0756)  Temp Source: Oral (10/23/24 2012)  Respirations: 19 (10/26/24 0727)  Height: 5' 2\" (157.5 cm) (10/23/24 2012)  Weight - Scale: 51.3 kg (113 lb 1.5 oz) (10/23/24 2012)  SpO2: 96 % (10/26/24 1050)  Physical Exam  Vitals and nursing note reviewed.   Constitutional:       General: She is not in acute distress.     Appearance: She is well-developed.   HENT:      Head: Normocephalic and atraumatic.   Eyes:      Conjunctiva/sclera: Conjunctivae normal.   Cardiovascular:      Rate and Rhythm: Normal rate and regular rhythm.      Heart sounds: No murmur heard.  Pulmonary:      Effort: Pulmonary effort is normal. No respiratory distress.      Breath sounds: Normal breath sounds.   Abdominal:      Palpations: Abdomen is soft.      Tenderness: There is no abdominal tenderness.   Musculoskeletal:         General: No swelling.      Cervical back: Neck supple.      Right lower leg: No edema.      Left lower leg: No edema.   Skin:     General: Skin is warm and dry.      Capillary Refill: Capillary refill takes less than 2 seconds.   Neurological:      Mental Status: She is alert. Mental status is at baseline.   Psychiatric:         Mood and Affect: Mood normal.          Discussion with Family: Patient declined call to .     Discharge instructions/Information to patient and family:   See after visit summary for information " provided to patient and family.      Provisions for Follow-Up Care:  See after visit summary for information related to follow-up care and any pertinent home health orders.      Mobility at time of Discharge:   Basic Mobility Inpatient Raw Score: 23  JH-HLM Goal: 7: Walk 25 feet or more  JH-HLM Achieved: 7: Walk 25 feet or more  HLM Goal achieved. Continue to encourage appropriate mobility.     Disposition:   Home    Planned Readmission: no    Discharge Medications:  See after visit summary for reconciled discharge medications provided to patient and/or family.          **Please Note: This note may have been constructed using a voice recognition system**

## 2024-10-26 NOTE — ASSESSMENT & PLAN NOTE
Patient continues to have pancytopenia.  Per transplant team no need for treatment at this time.  Patient received 3 days of IV  Venofer  Unfortunately unable to collect a.m. blood work for today given blocked port.    Patient will do blood work tomorrow morning as she gets medical staff at home that takes care of her port.

## 2024-10-26 NOTE — ASSESSMENT & PLAN NOTE
History of severe GERD status post fundoplication in 2023  Noted esophagitis on CT suspected in the setting of pill esophagitis versus Candida esophagitis  Continue with PPI and famotidine at discharge.

## 2024-10-27 PROBLEM — A41.9 SEPSIS (HCC): Status: RESOLVED | Noted: 2024-09-27 | Resolved: 2024-10-27

## 2024-10-27 PROBLEM — N10 ACUTE PYELONEPHRITIS: Status: RESOLVED | Noted: 2024-09-27 | Resolved: 2024-10-27

## 2024-10-27 LAB — BACTERIA BLD CULT: NORMAL

## 2024-10-28 NOTE — UTILIZATION REVIEW
NOTIFICATION OF INPATIENT ADMISSION   AUTHORIZATION REQUEST   SERVICING FACILITY:   [place] [address]  Tax ID: [id number]  NPI: [id number] ATTENDING PROVIDER:  Attending Name and NPI#: Anna Coats Md [[id number]]  Address: [address] [place] [address]  Phone: [phone]     ADMISSION INFORMATION:  Place of Service: Inpatient [facility]  Place of Service Code: 21  Inpatient Admission Date/Time: 10/22/24 12:55 AM  Discharge Date/Time: 10/26/2024  4:13 PM  Admitting Diagnosis Code/Description:  Altered mental status [R41.82]  Esophagitis [K20.90]  Upper GI bleed [K92.2]  AMS (altered mental status) [R41.82]     UTILIZATION REVIEW CONTACT:  Tata Moreno, Utilization   Network Utilization Review Department  Phone: [phone]  Fax [phone]  Email: [email or web address]  Contact for approvals/pending authorizations, clinical reviews, and discharge.     PHYSICIAN ADVISORY SERVICES:  Medical Necessity Denial & Gxql-yi-Msfi Review  Phone: [phone]  Fax: [phone]  Email: [email or web address]     DISCHARGE SUPPORT TEAM:  For Patients Discharge Needs & Updates  Phone: [phone] opt. 2 Fax: [phone]  Email: [email or web address]           Jayleen Zhao RN   Registered Nurse  Specialty: Utilization Review     Utilization Review     Addendum     Date of Service: 10/23/2024  7:51 AM     Expand All Collapse All    Initial Clinical Review     Admission: Date/Time/Statement:   Admission Orders (From admission, onward)          Ordered         10/22/24 0055   INPATIENT ADMISSION  Once                                     Orders Placed This Encounter   Procedures    INPATIENT ADMISSION       Standing Status:   Standing       Number of Occurrences:   1       Order Specific Question:   Level of Care       Answer:   Med Surg [16]       Order Specific Question:   Estimated length  "of stay       Answer:   More than 2 Midnights       Order Specific Question:   Certification       Answer:   I certify that inpatient services are medically necessary for this patient for a duration of greater than two midnights. See H&P and MD Progress Notes for additional information about the patient's course of treatment.      ED Arrival Information         Expected   -    Arrival   10/21/2024 20:33    Acuity   Emergent                 Means of arrival   Walk-In    Escorted by   Spouse    Service   Hospitalist    Admission type   Emergency                 Arrival complaint   ams                     Chief Complaint   Patient presents with    Altered Mental Status       Per , patient has been \"in and out of reality\" x 3 days. Patient not able to tell name and  during triage. Patient has been vomiting x several days         Initial Presentation: 48 y.o. 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  Patient has recurrent admissions for pneumonia and Pseudomonas/MSSA, also status post pneumonectomy following up with YAYA Mcdonnell.  She also has a severe history of GERD status post fundoplication in , anxiety, type 1 diabetes on insulin, pulmonary hypertension, CKD stage III, nephrolithiasis in  status post lithotripsy, pancreatic insufficiency on Creon, left lower extremity DVT on Eliquis who presents with vomiting times several days. Admitted IP status w/ hematemesis w/ nausea , toxic metabolic encephalopathy . Plan for NPO , IV PPI , GI consult . If mental status does not improved obtain MRI . Hx lung transplant Continue immunosuppression medications including Prograf, atovaquone, prednisone. On prophylactic Zithromax and acyclovir.  GERD protonix . DM SSI and monitor. Chronic pain lyrica and oxycodone . Hx DVT " LLE , eliquis on hold . Recheck labs in am .      Anticipated Length of Stay/Certification Statement:  Patient will be admitted on an inpatient basis with an anticipated length of stay of greater than 2 midnights secondary to hematemesis.      10/22 GI Consult   No evidence of significant hematemesis. Her nausea vomiting may be a mild gastroparesis flare or in the setting of polypharmacy given her significant altered mental status on admission. Will hold off on repeating EGD in the inpatient setting especially given no evidence of overt bleeding and normal hemoglobin.   Continue PPI twice daily and Pepcid nightly. Restart Reglan 4 times daily for gastroparesis. Continue Creon for pancreatic insufficiency.      10/22 IM Note   Pt oriented x1 to place.   Discussed case with transplant team at [facility] and the team agreeable to transfer the patient to their hospital.  Transfer d/t acute encephalopathy and N/V.      Date: 10/23   Day 2: mental status is back to baseline. Breath sound diminished . Cont supportive care . IV PPI and monitoring of labs . Required IV zofran x1 today .      10/23 GI Note   Gastroparesis, acute nausea and vomiting - Began after she was started on tetracyline for pnuemonia. CT on admission revealing esophageal wall thickening consistent with esophagitis. Tetracylines are known to cause upper GI upset. She is now on IV doxycline. Awaiting transfer to [facility] where she underwent lung transplant for history of cystic fiborsis.  Continue supportive care,  Add scoplamine patch,  PPI IV BID and Pepcid as ordered,  Low residue diet      ED Treatment-Medication Administration from 10/21/2024 2033 to 10/22/2024 0157           Date/Time Order Dose Route Action       10/21/2024 2110 ondansetron (ZOFRAN) injection 4 mg 4 mg Intravenous Given       10/21/2024 2110 morphine injection 4 mg 4 mg Intravenous Given       10/21/2024 2110 sodium chloride 0.9 % bolus 1,000 mL 1,000 mL Intravenous New Bag        10/21/2024 2155 iohexol (OMNIPAQUE) 350 MG/ML injection (MULTI-DOSE) 100 mL 100 mL Intravenous Given       10/21/2024 2237 pantoprazole (PROTONIX) injection 40 mg 40 mg Intravenous Given       10/22/2024 0029 ceftriaxone (ROCEPHIN) 1 g/50 mL in dextrose IVPB 1,000 mg Intravenous New Bag       10/22/2024 0028 ondansetron (ZOFRAN) injection 4 mg 4 mg Intravenous Given       10/22/2024 0032 sodium chloride 0.9 % bolus 500 mL 500 mL Intravenous New Bag                Scheduled Medications:  albuterol, 2.5 mg, Nebulization, BID  apixaban, 5 mg, Oral, BID  atovaquone, 1,500 mg, Oral, Daily  azithromycin, 250 mg, Oral, Once per day on Monday Wednesday Friday  budesonide-formoterol, 2 puff, Inhalation, Q12H  doxycycline, 100 mg, Intravenous, Q12H  famotidine, 20 mg, Intravenous, Q24H DOTTY  furosemide, 40 mg, Oral, Daily  insulin glargine, 5 Units, Subcutaneous, QAM  insulin lispro, 1-5 Units, Subcutaneous, Q6H  metoclopramide, 10 mg, Intravenous, Q6H DOTTY  oxybutynin, 15 mg, Oral, HS  pantoprazole, 40 mg, Intravenous, Q12H DOTTY  predniSONE, 10 mg, Oral, Daily  sodium chloride, 4 mL, Nebulization, BID  tacrolimus, 2 mg, Oral, BID  tamsulosin, 0.4 mg, Oral, Daily With Dinner  valACYclovir, 500 mg, Oral, Daily        Continuous IV Infusions:  multi-electrolyte, 100 mL/hr, Intravenous, Continuous        PRN Meds:  ondansetron, 4 mg, Intravenous, Q6H PRN  10/22 x2  10/23 x1                ED Triage Vitals   Temperature Pulse Respirations Blood Pressure SpO2 Pain Score   10/21/24 2035 10/21/24 2035 10/21/24 2035 10/21/24 2035 10/21/24 2035 10/21/24 2110   98 °F (36.7 °C) (!) 109 18 116/69 99 % 5      Weight (last 2 days)         Date/Time Weight     10/22/24 02:03:02 51.3 (113.1)     10/21/24 2035 54.4 (120)                Vital Signs (last 3 days)         Date/Time Temp Pulse Resp BP MAP (mmHg) SpO2 O2 Device Patient Position - Orthostatic VS [place] Coma Scale Score Pain     10/23/24 0747 -- 92 19 -- -- 99 % -- -- -- --      10/23/24 0730 -- -- -- -- -- 100 % None (Room air) -- 14 No Pain     10/23/24 0727 -- -- -- -- -- 98 % -- -- -- --     10/23/24 07:22:59 97.9 °F (36.6 °C) 90 -- 113/59 77 97 % -- -- -- --     10/23/24 0013 -- -- -- -- -- -- -- -- 14 --     10/23/24 0007 -- -- -- -- -- -- -- -- -- No Pain     10/22/24 22:11:52 98.3 °F (36.8 °C) 90 -- 116/73 87 96 % -- -- -- --     10/22/24 1949 -- 89 20 -- -- 96 % -- -- -- --     10/22/24 1930 -- -- -- -- -- -- None (Room air) -- 14 No Pain     10/22/24 15:37:18 100 °F (37.8 °C) 95 -- 113/74 87 94 % -- -- -- --     10/22/24 0900 -- -- -- -- -- 100 % -- -- -- --     10/22/24 0830 -- -- -- -- -- 97 % -- -- 14 --     10/22/24 0737 98.2 °F (36.8 °C) 98 18 113/79 90 97 % None (Room air) Sitting -- No Pain     10/22/24 0700 -- -- -- -- -- 97 % -- -- -- --     10/22/24 0500 -- -- -- -- -- 97 % -- -- -- --     10/22/24 0349 -- -- -- -- -- -- -- -- 14 No Pain     10/22/24 02:03:02 101.7 °F (38.7 °C) 100 18 115/80 92 96 % None (Room air) Sitting -- --     10/22/24 0100 -- -- -- -- -- 97 % -- -- -- --     10/22/24 0000 -- 96 16 118/64 -- 96 % None (Room air) -- -- --     10/21/24 2200 -- 96 22 126/78 100 92 % None (Room air) -- -- --     10/21/24 2125 -- -- -- -- -- -- None (Room air) -- 14 --     10/21/24 2110 -- -- -- -- -- -- -- -- -- 5     10/21/24 2100 -- 106 22 122/80 97 95 % -- -- -- --     10/21/24 2035 98 °F (36.7 °C) 109 18 116/69 -- 99 % None (Room air) Sitting -- --                   Pertinent Labs/Diagnostic Test Results:   Radiology:  MRI brain wo contrast   Final Interpretation by Houston Pryor MD (10/23 0123)       No evidence of acute infarct, acute intracranial hemorrhage or mass.       Workstation performed: [id number]           CT head without contrast   Final Interpretation by Nishant Saucedo MD (10/22 0805)       Small extra-axial hyperdensity at the anterior aspect of the left middle cranial fossa. Possible adjacent osseous remodeling. Favor small meningioma with  hemorrhage felt less likely. Recommend short interval follow-up CT versus characterization with    brain MRI.               This represents a change from the preliminary report.   I personally discussed this study with Anna Coats on 10/22/2024 8:04 AM.               Workstation performed: [id number]           CT chest abdomen pelvis w contrast   Final Interpretation by Nishant Saucedo MD (10/22 0718)       1.  Diffuse esophageal wall thickening and mucosal hyperenhancement suggests esophagitis.   2.  Rectosigmoid colonic wall prominence without inflammation may be due to underdistention but may be correlated for evidence of colitis. Constipation.   3.  Ancillary findings as above.               Findings in agreement with preliminary report.       Workstation performed: [id number]           XR chest 1 view portable   Final Interpretation by Howie Kemp MD (10/22 0819)   Stable postoperative change after right pneumonectomy with complete opacification right hemithorax and rightward mediastinal shift. Stable appearance to the transplanted left lung.           Workstation performed: [id number]              Cardiology:  No orders to display      GI:  No orders to display              Results from last 7 days   Lab Units 10/21/24  2107   SARS-COV-2   Negative            Results from last 7 days   Lab Units 10/22/24  0423 10/21/24  2102   WBC Thousand/uL 16.95* 20.92*   HEMOGLOBIN g/dL 11.8 12.9   HEMATOCRIT % 39.0 42.7   PLATELETS Thousands/uL 121* 142*   TOTAL NEUT ABS Thousands/µL  --  10.69*                Results from last 7 days   Lab Units 10/22/24  0423 10/21/24  2102   SODIUM mmol/L 140 134*   POTASSIUM mmol/L 4.0 3.9   CHLORIDE mmol/L 109* 99   CO2 mmol/L 25 27   ANION GAP mmol/L 6 8   BUN mg/dL 57* 68*   CREATININE mg/dL 1.21 1.14   EGFR ml/min/1.73sq m 53 57   CALCIUM mg/dL 7.5* 8.4   MAGNESIUM mg/dL 1.5*  --             Results from last 7 days   Lab Units 10/22/24  0423 10/21/24  2102   AST U/L 12* 14    ALT U/L 15 19   ALK PHOS U/L 63 65   TOTAL PROTEIN g/dL 5.8* 6.4   ALBUMIN g/dL 3.0* 3.3*   TOTAL BILIRUBIN mg/dL 0.47 0.72                  Results from last 7 days   Lab Units 10/23/24  1146 10/23/24  0350 10/22/24  2244 10/22/24  1535 10/22/24  1046 10/22/24  0749 10/22/24  0051 10/21/24  2048   POC GLUCOSE mg/dl 181* 125 200* 138 164* 214* 292* 406*            Results from last 7 days   Lab Units 10/22/24  0423 10/21/24  2102   GLUCOSE RANDOM mg/dL 261* 448*                Beta- Hydroxybutyrate   Date Value Ref Range Status   10/21/2024 0.27 0.02 - 0.27 mmol/L Final   05/21/2024 0.06 0.02 - 0.27 mmol/L Final               Results from last 7 days   Lab Units 10/21/24  2102   PH LORI   7.453*   PCO2 LORI mm Hg 34.5*   PO2 LORI mm Hg 34.2*   HCO3 LORI mmol/L 23.6*   BASE EXC LORI mmol/L 0.2   O2 CONTENT LORI ml/dL 12.2   O2 HGB, VENOUS % 63.7              Results from last 7 days   Lab Units 10/22/24  0104 10/21/24  2357 10/21/24  2102   HS TNI 0HR ng/L  --   --  17   HS TNI 2HR ng/L  --  20  --    HSTNI D2 ng/L  --  3  --    HS TNI 4HR ng/L 23  --   --    HSTNI D4 ng/L 6  --   --             Results from last 7 days   Lab Units 10/21/24  2102   PROCALCITONIN ng/ml 0.37*           Results from last 7 days   Lab Units 10/21/24  2102   LACTIC ACID mmol/L 1.9               Results from last 7 days   Lab Units 10/21/24  2102   LIPASE u/L <6*            Results from last 7 days   Lab Units 10/21/24  2352   CLARITY UA   Clear   COLOR UA   Light Yellow   SPEC GRAV UA   1.046*   PH UA   6.0   GLUCOSE UA mg/dl 1000 (1%)*   KETONES UA mg/dl Negative   BLOOD UA   Negative   PROTEIN UA mg/dl Negative   NITRITE UA   Negative   BILIRUBIN UA   Negative   UROBILINOGEN UA (BE) mg/dl <2.0   LEUKOCYTES UA   Negative           Results from last 7 days   Lab Units 10/21/24  2107   INFLUENZA A PCR   Negative   INFLUENZA B PCR   Negative   RSV PCR   Negative               Results from last 7 days   Lab Units 10/22/24  1018   AMPH/METH    Negative   BARBITURATE UR   Negative   BENZODIAZEPINE UR   Negative   COCAINE UR   Negative   METHADONE URINE   Negative   OPIATE UR   Positive*   PCP UR   Negative   THC UR   Negative               Results from last 7 days   Lab Units 10/22/24  0106 10/22/24  0104   BLOOD CULTURE   No Growth at 24 hrs.  --    GRAM STAIN RESULT    --  Gram positive cocci in clusters*            Medical History        Past Medical History:   Diagnosis Date    Abnormal uterine bleeding (AUB)      ABPA (allergic bronchopulmonary aspergillosis) ([facility]) 07/08/2012    Acute kidney injury ([facility]) 09/08/2017    Acute on chronic respiratory failure ([facility]) 03/04/2017     Last Assessment & Plan:  Formatting of this note might be different from the original. Recurrent with clinical worsening with enlarging PTX, s/p multiple chest tubes, but new fevers 6/19 and worsening leukocytosis indicating infection as cause of 6/19 decompensation with profound hypoxia in setting of shunting through right lung and continued pneumothorax s/p 2nd chest tube placement.  6/27: Right    Anaphylaxis 06/06/2022    Anemia      Asthma      Chest pain 09/21/2021    Chronic pain      Chronic respiratory failure with hypoxia ([facility]) 09/07/2021    COVID-19 06/03/2022    Cystic fibrosis ([facility])      Deep vein thrombosis ([facility])      Diabetes mellitus ([facility])      Fever 09/22/2024    GERD (gastroesophageal reflux disease)      HBP (high blood pressure)      HCAP (healthcare-associated pneumonia) 03/06/2020    History of transfusion      Influenza B 02/23/2020    Kidney stone      Moderate protein-calorie malnutrition ([facility]) 01/08/2022    Neutropenic fever  ([facility]) 09/26/2020    Pneumonia due to Pseudomonas species ([facility]) 10/28/2010     Last Assessment & Plan:  Formatting of this note might be different from the original. Polymicrobial pna w/ pseudomonas, M. Abscessus, Cadida glabrata/albicans    PONV (postoperative nausea and vomiting)      Pseudomonas aeruginosa infection 09/20/2012     Renal calculus      Squamous cell skin cancer 01/12/2023     SCCIS- Left upper thigh    Transplant recipient       Lung- 2013, 2017    Ulcerative colitis (HCC)      Wellness examination 02/04/2013     Formatting of this note might be different from the original. Discussed with patient and acknowledgement form signed on 2/4/2013 (ALYSHA)         Present on Admission:   Hematemesis with nausea   Toxic metabolic encephalopathy   GERD (gastroesophageal reflux disease)   Type 1 diabetes mellitus with diabetic chronic kidney disease, unspecified CKD stage (HCC)   Pancreatic insufficiency   Chronic pain disorder   Nausea and vomiting   Gastroparesis   Positive culture findings in sputum        Admitting Diagnosis: Altered mental status [R41.82]  Esophagitis [K20.90]  Upper GI bleed [K92.2]  AMS (altered mental status) [R41.82]  Age/Sex: 48 y.o. female     Network Utilization Review Department  ATTENTION: Please call with any questions or concerns to [phone] and carefully listen to the prompts so that you are directed to the right person. All voicemails are confidential.   For Discharge needs, contact Care Management DC Support Team at [phone] opt. 2  Send all requests for admission clinical reviews, approved or denied determinations and any other requests to dedicated fax number below belonging to the campus where the patient is receiving treatment. List of dedicated fax numbers for the Facilities:  FACILITY NAME UR FAX NUMBER   ADMISSION DENIALS (Administrative/Medical Necessity) [phone]   DISCHARGE SUPPORT TEAM (NETWORK) [phone]   PARENT CHILD HEALTH (Maternity/NICU/Pediatrics) [phone]   [facility] [phone]   [facility] [phone]   Critical access hospital [phone]   [facility] [phone]   Critical access hospital [phone]   [facility] [phone]   [facility]  [facility] [phone]   [facility] [phone]   [facility] [phone]   [facility] [phone]   [facility] [phone]   [facility] [phone]                  Revision History

## 2024-10-28 NOTE — UTILIZATION REVIEW
NOTIFICATION OF ADMISSION DISCHARGE   This is a Notification of Discharge from [facility]. Please be advised that this patient has been discharge from our facility. Below you will find the admission and discharge date and time including the patient’s disposition.   UTILIZATION REVIEW CONTACT:  Annmarie Veloz MA  Utilization   Network Utilization Review Department  Phone: [phone] x carefully listen to the prompts. All voicemails are confidential.  Email: [email or web address]     ADMISSION INFORMATION  PRESENTATION DATE: 10/21/2024  8:38 PM  OBERVATION ADMISSION DATE: N/A  INPATIENT ADMISSION DATE: 10/22/24 12:55 AM   DISCHARGE DATE: 10/26/2024  4:13 PM   DISPOSITION:Home with Home Health Care    Network Utilization Review Department  ATTENTION: Please call with any questions or concerns to [phone] and carefully listen to the prompts so that you are directed to the right person. All voicemails are confidential.   For Discharge needs, contact Care Management DC Support Team at [phone] opt. 2  Send all requests for admission clinical reviews, approved or denied determinations and any other requests to dedicated fax number below belonging to the campus where the patient is receiving treatment. List of dedicated fax numbers for the Facilities:  FACILITY NAME UR FAX NUMBER   ADMISSION DENIALS (Administrative/Medical Necessity) [phone]   DISCHARGE SUPPORT TEAM ([facility]) [phone]   PARENT CHILD HEALTH (Maternity/NICU/Pediatrics) [phone]   [facility] [phone]   [facility] [phone]   Critical access hospital [phone]   [facility] [phone]   [facility] [phone]   [facility] [phone]   [facility] [phone]   [facility]  [place] [phone]   [facility] [phone]   [facility] [phone]   [facility] [phone]   [facility] [phone]

## 2024-10-29 ENCOUNTER — TRANSITIONAL CARE MANAGEMENT (OUTPATIENT)
Dept: INTERNAL MEDICINE CLINIC | Facility: CLINIC | Age: 49
End: 2024-10-29

## 2024-10-29 DIAGNOSIS — E84.9 CYSTIC FIBROSIS (HCC): ICD-10-CM

## 2024-10-29 RX ORDER — ALPRAZOLAM 1 MG/1
1 TABLET ORAL
Qty: 30 TABLET | Refills: 0 | Status: ON HOLD | OUTPATIENT
Start: 2024-10-29

## 2024-10-30 NOTE — TELEPHONE ENCOUNTER
I lmom for pt to please all back to reschedule the EGD w/ BRAVO procedure with Dr Ko. Will call again if do not hear back from pt to try to reschedule.

## 2024-11-04 ENCOUNTER — OFFICE VISIT (OUTPATIENT)
Dept: INTERNAL MEDICINE CLINIC | Facility: CLINIC | Age: 49
End: 2024-11-04
Payer: COMMERCIAL

## 2024-11-04 VITALS
WEIGHT: 115.2 LBS | TEMPERATURE: 96.3 F | DIASTOLIC BLOOD PRESSURE: 65 MMHG | OXYGEN SATURATION: 96 % | HEART RATE: 79 BPM | HEIGHT: 63 IN | BODY MASS INDEX: 20.41 KG/M2 | SYSTOLIC BLOOD PRESSURE: 120 MMHG

## 2024-11-04 DIAGNOSIS — E10.22 TYPE 1 DIABETES MELLITUS WITH DIABETIC CHRONIC KIDNEY DISEASE, UNSPECIFIED CKD STAGE (HCC): ICD-10-CM

## 2024-11-04 DIAGNOSIS — Z23 ENCOUNTER FOR IMMUNIZATION: ICD-10-CM

## 2024-11-04 DIAGNOSIS — D84.9 IMMUNOSUPPRESSION (HCC): ICD-10-CM

## 2024-11-04 DIAGNOSIS — E55.9 HYPOVITAMINOSIS D: ICD-10-CM

## 2024-11-04 DIAGNOSIS — K21.00 GASTROESOPHAGEAL REFLUX DISEASE WITH ESOPHAGITIS WITHOUT HEMORRHAGE: Chronic | ICD-10-CM

## 2024-11-04 DIAGNOSIS — N18.31 STAGE 3A CHRONIC KIDNEY DISEASE (HCC): ICD-10-CM

## 2024-11-04 DIAGNOSIS — Z94.2 HX OF LUNG TRANSPLANT (HCC): ICD-10-CM

## 2024-11-04 DIAGNOSIS — D51.8 OTHER VITAMIN B12 DEFICIENCY ANEMIA: ICD-10-CM

## 2024-11-04 DIAGNOSIS — Z23 NEEDS FLU SHOT: ICD-10-CM

## 2024-11-04 DIAGNOSIS — I10 ESSENTIAL HYPERTENSION: ICD-10-CM

## 2024-11-04 DIAGNOSIS — D61.818 PANCYTOPENIA, ACQUIRED (HCC): ICD-10-CM

## 2024-11-04 DIAGNOSIS — D50.8 OTHER IRON DEFICIENCY ANEMIA: ICD-10-CM

## 2024-11-04 DIAGNOSIS — E84.9 CYSTIC FIBROSIS (HCC): ICD-10-CM

## 2024-11-04 DIAGNOSIS — R07.9 CHEST PAIN, UNSPECIFIED TYPE: Primary | ICD-10-CM

## 2024-11-04 PROCEDURE — 90662 IIV NO PRSV INCREASED AG IM: CPT

## 2024-11-04 PROCEDURE — 99214 OFFICE O/P EST MOD 30 MIN: CPT

## 2024-11-04 PROCEDURE — G0008 ADMIN INFLUENZA VIRUS VAC: HCPCS

## 2024-11-04 RX ORDER — SODIUM CHLORIDE 9 MG/ML
20 INJECTION, SOLUTION INTRAVENOUS ONCE
OUTPATIENT
Start: 2024-11-04

## 2024-11-04 NOTE — PROGRESS NOTES
INTERNAL MEDICINE HEALTH MAINTENANCE OFFICE VISIT  [facility] - [facility]ICINE [facility]    NAME: Maximilian Mann  AGE: 48 y.o. SEX: female  : 1975     DATE: 2024     Assessment and Plan:     {Assess/Plan SmartLinks:58861}    ***    Patient Counseling:  Nutrition: Stressed importance of moderation in sodium/caffeine intake, saturated fat, trans fat and cholesterol. Discussed portion control as well as increasing intake of fruits, vegetables, lean protein, and fish.   Exercise: Stressed the importance of regular exercise at least 150 mins/week  Sexuality: Discussed sexually transmitted diseases, partner selection, use of condoms, avoidance of unintended pregnancy  and contraceptive alternatives.  Injury prevention: Discussed safety belts, safety helmets, smoke detector, smoking near bedding or upholstery.   Dental health: Discussed importance of regular tooth brushing, flossing, and dental visits.  Immunizations reviewed. ***  Discussed benefits of screening ***.  Education was printed for patient    Discussed the patient's BMI with her.  The BMI {BMI plan (MU F measure 421):32170}    No follow-ups on file.     Chief Complaint:     No chief complaint on file.       History of Present Illness:     Well Adult Physical   Patient here for a comprehensive physical exam.The patient reports {problems:07709}    Diet and Physical Activity  Diet: {diet; well adult:22385}  Weight concerns: {weight concerns; well adult:89728}  Exercise: {exericse; well adult:89923}      Depression Screen  {Depression Screening Result:47444}     General Health  Hearing: {WELL ADULT HEARIN}  Vision: {vision; well adult:56609}  Dental: {dental; well adult:33955}    Reproductive Health  ***    The following portions of the patient's history were reviewed and updated as appropriate: allergies, current medications, past family history, past medical history, past social history, past surgical  history and problem list.     Review of Systems:     Review of Systems     Past Medical History:     Past Medical History:   Diagnosis Date    Abnormal uterine bleeding (AUB)     ABPA (allergic bronchopulmonary aspergillosis) (Formerly [facility]) 07/08/2012    Acute kidney injury (Formerly [facility]) 09/08/2017    Acute on chronic respiratory failure (Formerly [facility]) 03/04/2017    Last Assessment & Plan:  Formatting of this note might be different from the original. Recurrent with clinical worsening with enlarging PTX, s/p multiple chest tubes, but new fevers 6/19 and worsening leukocytosis indicating infection as cause of 6/19 decompensation with profound hypoxia in setting of shunting through right lung and continued pneumothorax s/p 2nd chest tube placement.  6/27: Right    Anaphylaxis 06/06/2022    Anemia     Asthma     Chest pain 09/21/2021    Chronic pain     Chronic respiratory failure with hypoxia ([facility]ly [facility]) 09/07/2021    COVID-19 06/03/2022    Cystic fibrosis ([facility])     Deep vein thrombosis ([facility])     Diabetes mellitus ([facility]ly [facility])     Fever 09/22/2024    GERD (gastroesophageal reflux disease)     HBP (high blood pressure)     HCAP (healthcare-associated pneumonia) 03/06/2020    History of transfusion     Influenza B 02/23/2020    Kidney stone     Moderate protein-calorie malnutrition ([facility]ly [facility]) 01/08/2022    Neutropenic fever  ([facility]ly [facility]) 09/26/2020    Pneumonia due to Pseudomonas species ([facility]ly [facility]) 10/28/2010    Last Assessment & Plan:  Formatting of this note might be different from the original. Polymicrobial pna w/ pseudomonas, M. Abscessus, Cadida glabrata/albicans    PONV (postoperative nausea and vomiting)     Pseudomonas aeruginosa infection 09/20/2012    Renal calculus     Squamous cell skin cancer 01/12/2023    SCCIS- Left upper thigh    Transplant recipient     Lung- 2013, 2017    Ulcerative colitis ([facility])     Wellness examination 02/04/2013    Formatting of this note might be different from the original. Discussed with patient and acknowledgement form signed on  2/4/2013 (ALYSHA)        Past Surgical History:     Past Surgical History:   Procedure Laterality Date    APPENDECTOMY      AUGMENTATION MAMMAPLASTY Bilateral 2004    AUGMENTATION MAMMAPLASTY Right 2021    removed     BREAST SURGERY      Aug.     BRONCHOSCOPY      CHOLECYSTECTOMY      COLONOSCOPY      EGD  07/2019    Yeast Infect.     FL RETROGRADE PYELOGRAM  01/09/2020    FL RETROGRADE PYELOGRAM  02/04/2020    FL RETROGRADE PYELOGRAM  9/20/2024    GASTROSCOPY  03/01/2024    GASTROSTOMY TUBE, PLACE      IR BALLOON-OCCLUDED ANTEGRADE TRANSVENOUS OBLITERATION (BATO)  11/22/2022    IR PORT PLACEMENT  03/22/2022    IR PORT REMOVAL  01/10/2022    IR PORT STRIPPING  11/25/2022    IR TUNNELED CENTRAL LINE PLACEMENT  01/13/2022    LUNG SURGERY      LUNG TRANSPLANT      X2    LA CYSTO BLADDER W/URETERAL CATHETERIZATION Right 01/09/2020    Procedure: CYSTOSCOPY RETROGRADE PYELOGRAM WITH INSERTION STENT URETERAL;  Surgeon: Patricio Perry MD;  Location: [facility];  Service: Urology    LA CYSTO/URETERO W/LITHOTRIPSY &INDWELL STENT INSRT Right 02/04/2020    Procedure: CYSTOSCOPY URETEROSCOPY WITH LITHOTRIPSY HOLMIUM LASER, RETROGRADE PYELOGRAM AND INSERTION STENT URETERAL;  Surgeon: Patricio Perry MD;  Location: [facility];  Service: Urology    LA CYSTO/URETERO W/LITHOTRIPSY &INDWELL STENT INSRT Left 9/20/2024    Procedure: CYSTOSCOPY URETEROSCOPY WITH LITHOTRIPSY HOLMIUM LASER, RETROGRADE PYELOGRAM AND INSERTION STENT URETERAL, BASKET STONE EXTRACTION;  Surgeon: Lee Briceno MD;  Location: [facility];  Service: Urology    SKIN BIOPSY      US GUIDED VASCULAR ACCESS  01/17/2017        Social History:     Social History     Socioeconomic History    Marital status: /Civil Union     Spouse name: Not on file    Number of children: Not on file    Years of education: 12    Highest education level: Not on file   Occupational History    Occupation: Disabled    Tobacco Use    Smoking status: Former     Current  packs/day: 0.00     Average packs/day: 0.5 packs/day for 12.0 years (6.0 ttl pk-yrs)     Types: Cigarettes     Start date: 1993     Quit date: 2005     Years since quittin.8     Passive exposure: Past    Smokeless tobacco: Never   Vaping Use    Vaping status: Never Used   Substance and Sexual Activity    Alcohol use: Yes     Alcohol/week: 2.0 standard drinks of alcohol     Types: 2 Glasses of wine per week     Comment: social    Drug use: Yes     Frequency: 2.0 times per week     Types: Marijuana     Comment: medical edible marijuana prescribed    Sexual activity: Yes     Partners: Male     Birth control/protection: None   Other Topics Concern    Not on file   Social History Narrative    Most recent tobacco use screenin2019    Do you currently or have you served in the  [employer]: No    Were you activated, into active duty, as a member of the [employer] or as a Reservist: No    Occupation: disabled    Education: 12    Marital status:     Exercise level: Occasional    Diet: Specific    low sugar, high fat    General stress level: High    Alcohol intake: Occasional    Caffeine intake: Heavy    Chewing tobacco: none    Illicit drugs: none    Guns present in home: No    Seat belts used routinely: Yes    Smoke alarm in home: Yes    Advance directive: Yes     Social Determinants of Health     Financial Resource Strain: Low Risk  (11/10/2022)    Received from [facility]    Overall Financial Resource Strain (CARDIA)     Difficulty of Paying Living Expenses: Not hard at all   Food Insecurity: No Food Insecurity (10/24/2024)    Nursing - Inadequate Food Risk Classification     Worried About Running Out of Food in the Last Year: Never true     Ran Out of Food in the Last Year: Never true     Ran Out of Food in the Last Year: 1   Transportation Needs: No Transportation Needs (10/24/2024)    Nursing - Transportation Risk  Classification     Lack of Transportation: Not on file     Lack of Transportation: 2   Physical Activity: Insufficiently Active (1/15/2024)    Exercise Vital Sign     Days of Exercise per Week: 7 days     Minutes of Exercise per Session: 20 min   Stress: No Stress Concern Present (1/15/2024)    [employer] [place] [employer] - Occupational Stress Questionnaire     Feeling of Stress : Only a little   Social Connections: Unknown (2024)    Received from [employer]    Social [employer]     How often do you feel lonely or isolated from those around you? (Adult - for ages 18 years and over): Not on file   Intimate Partner Violence: Unknown (10/24/2024)    Nursing IPS     Feels Physically and Emotionally Safe: Not on file     Physically Hurt by Someone: Not on file     Humiliated or Emotionally Abused by Someone: Not on file     Physically Hurt by Someone: 2     Hurt or Threatened by Someone: 2   Housing Stability: Unknown (10/24/2024)    Nursing: Inadequate Housing Risk Classification     Has Housing: Not on file     Worried About Losing Housing: Not on file     Unable to Get Utilities: Not on file     Unable to Pay for Housing in the Last Year: 2     Has Housin        Family History:     Family History   Problem Relation Age of Onset    Alcohol abuse Mother     Mental illness Mother     Depression Mother     Alcohol abuse Father     COPD Maternal Grandmother     Lung cancer Maternal Grandmother 65    Cancer Maternal Grandmother     COPD Maternal Grandfather     Cancer Maternal Grandfather     COPD Paternal Grandmother     Lung cancer Paternal Grandmother 65    No Known Problems Sister     No Known Problems Paternal Aunt     Breast cancer Neg Hx     Ovarian cancer Neg Hx     Colon cancer Neg Hx         Current Medications:     Outpatient Medications Prior to Visit   Medication Sig Dispense Refill    acetaminophen (TYLENOL) 325 mg tablet Take 3 tablets (975 mg total) by mouth every 8 (eight) hours  (Patient not taking: Reported on 10/24/2024)  0    albuterol (2.5 mg/3 mL) 0.083 % nebulizer solution Take 6 mL (5 mg total) by nebulization 2 (two) times a day  0    albuterol (PROVENTIL HFA,VENTOLIN HFA) 90 mcg/act inhaler INHALE 2 PUFFS EVERY 8 HOURS AS NEEDED FOR SHORTNESS OF BREATH OR WHEEZING.      ALPRAZolam (XANAX) 1 mg tablet Take 1 tablet (1 mg total) by mouth daily at bedtime as needed for anxiety or sleep 30 tablet 0    apixaban (ELIQUIS) 5 mg Take 1 tablet (5 mg total) by mouth 2 (two) times a day 30 tablet 0    Ascorbic Acid (vitamin C) 100 MG tablet Take 100 mg by mouth daily      atovaquone (MEPRON) 750 mg/5 mL suspension Take 1,500 mg by mouth daily      azithromycin (ZITHROMAX) 250 mg tablet Take 250 mg by mouth 3 (three) times a week      BD Pen Needle Abi U/F 32G X 4 MM MISC 4 times daily 400 each 1    bisacodyl (DULCOLAX) 5 mg EC tablet Take 1 tablet (5 mg total) by mouth daily at bedtime 30 tablet 0    budesonide-formoterol (SYMBICORT) 160-4.5 mcg/act inhaler Inhale 2 puffs every 12 (twelve) hours      Cholecalciferol (Vitamin D3) 125 MCG (5000 UT) TABS Take 5,000 Units by mouth daily      Continuous Blood Gluc  (Dexcom G7 ) SUKHI Use 1 each continuous 1 each 0    Continuous Glucose Sensor (Dexcom G7 Sensor) Use 1 Device every 10 days 3 each 5    dexlansoprazole (DEXILANT) 60 MG capsule TAKE 1 CAPSULE BY MOUTH EVERY DAY IN THE MORNING 100 capsule 1    dronabinol (MARINOL) 5 MG capsule Take 1 capsule (5 mg total) by mouth 3 (three) times a day (Patient not taking: Reported on 10/24/2024) 90 capsule 0    famotidine (PEPCID) 40 MG tablet TAKE 1 TABLET BY MOUTH EVERYDAY AT BEDTIME 90 tablet 1    fluconazole (DIFLUCAN) 150 mg tablet Take 150 mg by mouth once (Patient not taking: Reported on 10/24/2024)      furosemide (LASIX) 20 mg tablet Take 40 mg by mouth daily (Patient not taking: Reported on 10/26/2024)      insulin aspart (NovoLOG FlexPen) 100 UNIT/ML injection pen INJECT 10  UNITS pre-BREAKFAST, 8 UNITS pre-LUNCH and 12 UNITS pre-DINNER plus scale; max 50 UNITS/day 45 mL 1    Insulin Disposable Pump (Omnipod 5 G6 Intro, Gen 5,) KIT Use 1 Units continuous (Patient not taking: Reported on 10/24/2024) 1 kit 0    Insulin Disposable Pump (Omnipod 5 G6 Pods, Gen 5,) MISC Use 1 Units every other day (Patient not taking: Reported on 10/24/2024) 15 each 3    Insulin Glargine-yfgn (Semglee, yfgn,) 100 UNIT/ML SOPN Inject 0.12 mL (12 Units total) under the skin every morning 15 mL 1    Insulin Pen Needle (Comfort EZ Pen Needles) 33G X 4 MM MISC Use to inject insulin 4 times a day 100 each 3    multivitamin (THERAGRAN) TABS Take 1 tablet by mouth daily      naloxone (NARCAN) 4 mg/0.1 mL nasal spray 0.1 mL (4 mg total) into each nostril every 3 (three) minutes as needed for opioid reversal or respiratory depression (Patient not taking: Reported on 10/24/2024) 2 each 5    ondansetron (ZOFRAN) 4 mg tablet Take 4 mg by mouth every 8 (eight) hours as needed for nausea      oxybutynin (DITROPAN XL) 15 MG 24 hr tablet Take 1 tablet (15 mg total) by mouth daily at bedtime 90 tablet 3    pancrelipase, Lip-Prot-Amyl, (CREON) 24,000 units Take 48,000 Units by mouth 3 (three) times a day with meals      polyethylene glycol (MIRALAX) 17 g packet Take 17 g by mouth 2 (two) times a day (Patient taking differently: Take 17 g by mouth 2 (two) times a day as needed)  0    predniSONE 10 mg tablet Take 10 mg by mouth daily Taking 15 mg now (Patient not taking: Reported on 10/24/2024)      pregabalin (LYRICA) 150 mg capsule Take 1 capsule (150 mg total) by mouth 2 times a day 180 capsule 0    Respiratory Therapy Supplies (Nebulizer) SUKHI Please dispense nebulizer machine      tacrolimus (PROGRAF) 1 mg capsule Take 2 capsules (2 mg total) by mouth 2 (two) times a day      tamsulosin (FLOMAX) 0.4 mg Take 1 capsule (0.4 mg total) by mouth daily with dinner 14 capsule 0    Turmeric 500 MG CAPS Take by mouth       "valACYclovir (VALTREX) 500 mg tablet Take 500 mg by mouth daily      vitamin B-12 (VITAMIN B-12) 1,000 mcg tablet Take by mouth daily       No facility-administered medications prior to visit.         Allergies:     Allergies   Allergen Reactions    Bebtelovimab Anaphylaxis    Vancomycin Angioedema and Hives    Gabapentin Hallucinations    Ceftazidime Headache     Severe headaches after desensitization    Nsaids Other (See Comments)     Lung transplant increases risk of renal toxicity, call lung txp provider to discuss if needed        Objective:     Physical Exam:  {exam; complete:58280}    Data:    Laboratory Results: {Labs reviewed:50071}  Radiology/Other Diagnostic Testing Results: {Results Review Statement:66226::\"No pertinent imaging studies reviewed.\"}     Health Maintenance:     Health Maintenance   Topic Date Due    Hepatitis C Screening  Never done    HIV Screening  Never done    Zoster Vaccine (1 of 2) Never done    DTaP,Tdap,and Td Vaccines (1 - Tdap) Never done    DM Eye Exam  03/21/2023    Annual Physical  08/01/2023    Kidney Health Evaluation: Albumin/Creatinine Ratio  02/20/2024    Influenza Vaccine (1) 09/01/2024    COVID-19 Vaccine (6 - 2023-24 season) 09/01/2024    Breast Cancer Screening: Mammogram  01/08/2025    Diabetic Foot Exam  01/15/2025    HEMOGLOBIN A1C  03/21/2025    Depression Screening  08/07/2025    Kidney Health Evaluation: GFR  10/25/2025    Cervical Cancer Screening  04/23/2026    Colorectal Cancer Screening  06/28/2026    RSV Vaccine Age 60+ Years (1 - 1-dose 60+ series) 12/18/2037    Pneumococcal Vaccine: Pediatrics (0 to 5 Years) and At-Risk Patients (6 to 64 Years) (4 of 4 - PPSV23 or PCV20) 12/18/2042    RSV Vaccine age 0-20 Months  Aged Out    HIB Vaccine  Aged Out    IPV Vaccine  Aged Out    Hepatitis A Vaccine  Aged Out    Meningococcal ACWY Vaccine  Aged Out    HPV Vaccine  Aged Out     Immunization History   Administered Date(s) Administered    COVID-19 MODERNA VACC 0.5 " ML IM 01/25/2021, 02/19/2021, 08/18/2021, 02/17/2022    COVID-19 Pfizer Vac BIVALENT David-sucrose 12 Yr+ IM 11/14/2022    INFLUENZA 11/05/2012, 10/23/2013, 09/05/2014, 10/01/2014, 10/30/2015, 11/15/2016, 10/02/2017, 10/17/2018, 10/29/2018, 11/15/2019, 10/21/2020, 10/29/2021, 01/06/2024    Influenza Quadrivalent 3 years and older 10/16/2020    Influenza Split High Dose Preservative Free IM 10/20/2022    Influenza Split Preservative Free ID 10/23/2013, 09/05/2014    Pneumococcal Conjugate 13-Valent 11/15/2019    Pneumococcal Polysaccharide PPV23 10/15/2009, 09/05/2014, 10/01/2014, 11/01/2016, 10/29/2018       Nancie Doherty MD  [facility] INTERNAL MEDICINE [place]

## 2024-11-04 NOTE — PROGRESS NOTES
Transition of Care Visit  Name: Maximilian Mann      : 1975      MRN: [id number]  Encounter Provider: Nancie Doherty MD  Encounter Date: 2024   Encounter department: [facility] INTERNAL MEDICINE [place]    Assessment & Plan  Type 1 diabetes mellitus with diabetic chronic kidney disease, unspecified CKD stage (HCC)    Lab Results   Component Value Date    HGBA1C 7.5 (H) 2024     Controlled  Continue home regimen Lantus 12 point, NovoLog 10-8-12 with meals  Orders:    Albumin / creatinine urine ratio; Future    Albumin / creatinine urine ratio    Hypovitaminosis D  Continue vitamin D 5  Orders:    Vitamin D 25 hydroxy; Future    Vitamin D 25 hydroxy    Other vitamin B12 deficiency anemia  Iron deficiency anemia  Status post 2 treatments with iron sucrose infusions while admitted    Plan  Scheduled for last iron show gross infusion  Repeat CBC      Orders:    Vitamin B12; Future    Vitamin B12    Other iron deficiency anemia  Iron deficiency anemia  Status post 2 treatments with iron sucrose infusions while admitted    Plan  Scheduled for last iron show gross infusion  Repeat CBC      Orders:    CBC and differential; Future    CBC and differential    Chest pain, unspecified type  Presents with pressure-like type chest pain, nonexertional, lasted about 1 hour, occasionally related to food or drink.  Relieved by oxycodone and PPI.  No worsening shortness of breath cough  Symptoms have been ongoing since her last admission  Seems to be more and more bothersome  At the time of the visit, no active chest pain    Atypical chest pain female with multiple comorbidities, cannot rule out angina type of coronary disease  Other differential esophageal spasm, uncontrolled GERD, vasospasm      Plan  Continue PPI, famotidine  Will get a coronary artery CT as patient may be unable to undergo stress testing  Patient also brought to the ED for any worsening chest pain or shortness of  breath.      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  Has had recurrent admissions for pneumonia and Pseudomonas/MSSA and status post pneumonectomy    Continue tacrolimus and prednisone  Continue prophylaxis with valacyclovir, azithromycin, atovaquone    Immunosuppression (HCC)  Secondary to lung transplant  Patient is in chronic rejection  Continue tacrolimus and prednisone  Continue follow-up with YAYA Mcdonnell       Stage 3a chronic kidney disease (HCC)  Lab Results   Component Value Date    EGFR 49 10/25/2024    EGFR 63 10/24/2024    EGFR 53 10/22/2024    CREATININE 1.30 10/25/2024    CREATININE 1.05 10/24/2024    CREATININE 1.21 10/22/2024     Creatinine at baseline  Monitor       Gastroesophageal reflux disease with esophagitis without hemorrhage  Continue PPI and famotidine  Patient is scheduled for repeat EGD with GI  Encourage to follow-up with them              History of Present Illness     Transitional Care Management Review:   Maximilian FLOR Sourav Mann is a pleasant 48 y.o. female here for TCM follow up.     She has an  extensive past medical history notable for cystic fibrosis diagnosed at the age of 6 months s/p bilateral lung transplant in 2013 complicated by rejection and severe bronchiolitis obliterans requiring/retransplant of left single  lung March 2017.   Recurrent admissions for pneumonia and Pseudomonas/MSSA, also s/p pneumonectomy, following up with YAY[facility] Mcdonnell.  She is currently in chronic rejection on prednisone and tacrolimus. Also has a history of severe GERD s/p fundoplication in 2023, anxiety, type 1 diabetes, pulmonary hypertension, CKD 3, nephrolithiasis in 2011 status post lithotripsy, pancreatic insufficiency, left lower extremity DVT on Eliquis.     Maximilian was admitted at [facility] between 10/21-10/26 after she presented with changes in mental status, intractable nausea and vomiting.  She was noted to be febrile and have elevated WBC  Work for encephalopathy including CT head without pathology. was largely unremarkable, she had a CT abdomen/pelvis which esophageal thickening  She was evaluated by GI due to some concern for hematemesis. They felt her symptoms were largely driven by gastroparesis flare in setting of polypharmacy and new medication use. No hematemesis.   Recommended supportive care, Reglan , PPI and to follow up for EGD    ID was consulted as well given complicated transplant history and her presentation with AMS.  Again though her N/V was pill esophagitis in setting of minocycline use, possibly associated candida esophagitis. Note that prior to admission, patient was started on minocycline for MRSA in sputum with suspected PNA by her transplant team.  1/2 blood cultures positive for Staph epidermidis believed to be contaminant.  She was continued on IV doxycyline and started on micafungin.  Also the primary hospital team communicated with her transplant team throughout the admission. She was initially going to get transferred but given clinical improved they decided to cancel transfer   She did receive 2 iron infusions given known hx of NANI and admission Hb 7.4       On presentation at the visit today, patient states she feels better than in hospital but not 100% yet. She is able to eat her foods and drink without significant  N/V. She does have some good and bad days      She reports pressure like chest pain, 'Feels lke something is sitting on her chest. She states symptoms began while in the hospital but mild at the time.  Symptoms mostly nonexertional, nonreproducible, lasts about an hour relieved by oxycodone and sometimes PPI.  Sometimes made worse with food or drink.  Denies worsening shortness of breath fever, changes to cough.  For the above symptoms discussed differential which could be GERD/esophageal spasm but cannot rule out atypical chest pain from coronary disease.  Because patient may be an able to undergo stress testing, discussed getting a coronary artery CT.  In the meantime we will continue with PPI, famotidine and follow-up with GI for endoscopy.    Patient was to go to the ED if symptoms were to worsen    High-dose flu shot administered during this visit        During the TCM phone call patient stated:  TCM Call       Date and time call was made  10/28/2024  5:30 PM    Hospital care reviewed  Records reviewed    Patient was hospitial[facility] at  [facility]    Comment  DC TO [facility]    Date of Admission  10/21/24    Date of discharge  10/26/24    Diagnosis  Nausea and vomiting    Disposition  Home    Were the patients medications reviewed and updated  Yes    Current Symptoms  Middle abdominal pain    Middle abdominal pain onset  Gradual          TCM Call       Post hospital issues  Reduced activity    Should patient be enrolled in anticoag monitoring?  Yes    Scheduled for follow up?  Yes    Not clinically warranted  DC TO [facility]    Did you obtain your prescribed medications  Yes    Do you need help managing your prescriptions or medications  No    Is transportation to your appointment needed  No    I have advised the patient to call PCP with any new or worsening symptoms  Fiona Zhu LPN    Living Arrangements  Spouse or Significiant other    Support System  Family    The type of support provided  Emotional;  "Financial; Physical    Do you have social support  Yes, as much as I need    Are you recieving any outpatient services  No    Are you recieving home care services  No    Are you using any community resources  No    Current waiver services  No    Have you fallen in the last 12 months  No    Interperter language line needed  No    Counseling  Family    Comments  DNSA 11/09/2024          HPI  Review of Systems  Objective     /65 (BP Location: Left arm, Patient Position: Sitting, Cuff Size: Large)   Pulse 79   Temp (!) 96.3 °F (35.7 °C) (Tympanic)   Ht 5' 2.5\" (1.588 m)   Wt 52.3 kg (115 lb 3.2 oz)   SpO2 96%   BMI 20.73 kg/m²     Physical Exam  Vitals and nursing note reviewed.   Constitutional:       General: She is not in acute distress.     Appearance: She is well-developed.   HENT:      Head: Normocephalic and atraumatic.   Eyes:      Conjunctiva/sclera: Conjunctivae normal.   Cardiovascular:      Rate and Rhythm: Normal rate and regular rhythm.      Heart sounds: No murmur heard.  Pulmonary:      Effort: Pulmonary effort is normal. No respiratory distress.      Breath sounds: Normal breath sounds.   Abdominal:      Palpations: Abdomen is soft.      Tenderness: There is no abdominal tenderness.   Musculoskeletal:         General: No swelling.      Cervical back: Neck supple.   Skin:     General: Skin is warm and dry.      Capillary Refill: Capillary refill takes less than 2 seconds.   Neurological:      Mental Status: She is alert.   Psychiatric:         Mood and Affect: Mood normal.       Medications have been reviewed by provider in current encounter      "

## 2024-11-05 PROBLEM — Z00.00 ANNUAL PHYSICAL EXAM: Status: RESOLVED | Noted: 2023-06-05 | Resolved: 2024-11-05

## 2024-11-05 PROBLEM — R79.89 ELEVATED SERUM CREATININE: Status: RESOLVED | Noted: 2024-09-27 | Resolved: 2024-11-05

## 2024-11-05 PROBLEM — R07.9 CHEST PAIN: Status: ACTIVE | Noted: 2024-11-05

## 2024-11-05 PROBLEM — R65.10 SIRS (SYSTEMIC INFLAMMATORY RESPONSE SYNDROME) (HCC): Status: RESOLVED | Noted: 2024-10-23 | Resolved: 2024-11-05

## 2024-11-05 PROBLEM — R84.5 POSITIVE CULTURE FINDINGS IN SPUTUM: Status: RESOLVED | Noted: 2024-10-23 | Resolved: 2024-11-05

## 2024-11-05 PROBLEM — E87.8 ELECTROLYTE ABNORMALITY: Status: RESOLVED | Noted: 2024-09-28 | Resolved: 2024-11-05

## 2024-11-05 PROBLEM — R78.81 POSITIVE BLOOD CULTURE: Status: RESOLVED | Noted: 2024-10-23 | Resolved: 2024-11-05

## 2024-11-05 NOTE — ASSESSMENT & PLAN NOTE
Continue PPI and famotidine  Patient is scheduled for repeat EGD with GI  Encourage to follow-up with them

## 2024-11-05 NOTE — ASSESSMENT & PLAN NOTE
Lab Results   Component Value Date    HGBA1C 7.5 (H) 09/21/2024     Controlled  Continue home regimen Lantus 12 point, NovoLog 10-8-12 with meals  Orders:    Albumin / creatinine urine ratio; Future    Albumin / creatinine urine ratio

## 2024-11-05 NOTE — ASSESSMENT & PLAN NOTE
Presents with pressure-like type chest pain, nonexertional, lasted about 1 hour, occasionally related to food or drink.  Relieved by oxycodone and PPI.  No worsening shortness of breath cough  Symptoms have been ongoing since her last admission  Seems to be more and more bothersome  At the time of the visit, no active chest pain    Atypical chest pain female with multiple comorbidities, cannot rule out angina type of coronary disease  Other differential esophageal spasm, uncontrolled GERD, vasospasm      Plan  Continue PPI, famotidine  Will get a coronary artery CT as patient may be unable to undergo stress testing  Patient also brought to the ED for any worsening chest pain or shortness of breath.      Orders:    CTA CORONARY ARTERY SCAN w FFR if needed; Future    Ambulatory Referral to Cardiology; Future

## 2024-11-05 NOTE — ASSESSMENT & PLAN NOTE
Lab Results   Component Value Date    EGFR 49 10/25/2024    EGFR 63 10/24/2024    EGFR 53 10/22/2024    CREATININE 1.30 10/25/2024    CREATININE 1.05 10/24/2024    CREATININE 1.21 10/22/2024     Creatinine at baseline  Monitor

## 2024-11-05 NOTE — ASSESSMENT & PLAN NOTE
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  Has had recurrent admissions for pneumonia and Pseudomonas/MSSA and status post pneumonectomy    Continue tacrolimus and prednisone  Continue prophylaxis with valacyclovir, azithromycin, atovaquone

## 2024-11-05 NOTE — ASSESSMENT & PLAN NOTE
Iron deficiency anemia  Status post 2 treatments with iron sucrose infusions while admitted    Plan  Scheduled for last iron show gross infusion  Repeat CBC      Orders:    Vitamin B12; Future    Vitamin B12

## 2024-11-05 NOTE — ASSESSMENT & PLAN NOTE
Iron deficiency anemia  Status post 2 treatments with iron sucrose infusions while admitted    Plan  Scheduled for last iron show gross infusion  Repeat CBC      Orders:    CBC and differential; Future    CBC and differential

## 2024-11-05 NOTE — ASSESSMENT & PLAN NOTE
Secondary to lung transplant  Patient is in chronic rejection  Continue tacrolimus and prednisone  Continue follow-up with YAYA Mcdonnell

## 2024-11-06 ENCOUNTER — PREP FOR PROCEDURE (OUTPATIENT)
Dept: UROLOGY | Facility: AMBULATORY SURGERY CENTER | Age: 49
End: 2024-11-06

## 2024-11-06 DIAGNOSIS — N20.0 RENAL CALCULUS, LEFT: Primary | ICD-10-CM

## 2024-11-06 DIAGNOSIS — E10.22 TYPE 1 DIABETES MELLITUS WITH DIABETIC CHRONIC KIDNEY DISEASE, UNSPECIFIED CKD STAGE (HCC): ICD-10-CM

## 2024-11-06 DIAGNOSIS — Z01.818 PRE-OPERATIVE CLEARANCE: ICD-10-CM

## 2024-11-06 DIAGNOSIS — Z01.812 PRE-OPERATIVE LABORATORY EXAMINATION: ICD-10-CM

## 2024-11-06 DIAGNOSIS — R39.89 SUSPECTED UTI: ICD-10-CM

## 2024-11-07 ENCOUNTER — TELEPHONE (OUTPATIENT)
Dept: INTERNAL MEDICINE CLINIC | Facility: CLINIC | Age: 49
End: 2024-11-07

## 2024-11-07 DIAGNOSIS — F11.20 CONTINUOUS OPIOID DEPENDENCE (HCC): Primary | ICD-10-CM

## 2024-11-07 NOTE — TELEPHONE ENCOUNTER
I called the [facility] to schedule her 3rd Iron infusion, I left detailed message. They are to call patient or our office with schedule.

## 2024-11-08 RX ORDER — OXYCODONE HYDROCHLORIDE 10 MG/1
10 TABLET ORAL EVERY 4 HOURS PRN
Qty: 180 TABLET | Refills: 0 | Status: ON HOLD | OUTPATIENT
Start: 2024-11-08 | End: 2024-12-08

## 2024-11-10 ENCOUNTER — APPOINTMENT (EMERGENCY)
Dept: RADIOLOGY | Facility: HOSPITAL | Age: 49
DRG: 915 | End: 2024-11-10
Payer: COMMERCIAL

## 2024-11-10 ENCOUNTER — APPOINTMENT (EMERGENCY)
Dept: CT IMAGING | Facility: HOSPITAL | Age: 49
DRG: 915 | End: 2024-11-10
Payer: COMMERCIAL

## 2024-11-10 ENCOUNTER — HOSPITAL ENCOUNTER (INPATIENT)
Facility: HOSPITAL | Age: 49
LOS: 6 days | Discharge: HOME | DRG: 915 | End: 2024-11-18
Attending: EMERGENCY MEDICINE | Admitting: STUDENT IN AN ORGANIZED HEALTH CARE EDUCATION/TRAINING PROGRAM
Payer: COMMERCIAL

## 2024-11-10 DIAGNOSIS — Z79.83 LONG TERM CURRENT USE OF BISPHOSPHONATES: ICD-10-CM

## 2024-11-10 DIAGNOSIS — K21.00 GASTROESOPHAGEAL REFLUX DISEASE WITH ESOPHAGITIS WITHOUT HEMORRHAGE: ICD-10-CM

## 2024-11-10 DIAGNOSIS — E84.9 CYSTIC FIBROSIS (HCC): ICD-10-CM

## 2024-11-10 DIAGNOSIS — G89.4 CHRONIC PAIN DISORDER: ICD-10-CM

## 2024-11-10 DIAGNOSIS — T78.3XXA ANGIOEDEMA, INITIAL ENCOUNTER: Primary | ICD-10-CM

## 2024-11-10 DIAGNOSIS — R05.8 PRODUCTIVE COUGH: ICD-10-CM

## 2024-11-10 DIAGNOSIS — E10.22 TYPE 1 DIABETES MELLITUS WITH DIABETIC CHRONIC KIDNEY DISEASE, UNSPECIFIED CKD STAGE (HCC): ICD-10-CM

## 2024-11-10 DIAGNOSIS — Z94.2 HX OF LUNG TRANSPLANT (HCC): ICD-10-CM

## 2024-11-10 DIAGNOSIS — D84.9 IMMUNOSUPPRESSION (HCC): ICD-10-CM

## 2024-11-10 PROBLEM — Z86.711 HISTORY OF PULMONARY EMBOLUS (PE): Status: ACTIVE | Noted: 2024-09-20

## 2024-11-10 LAB
2HR DELTA HS TROPONIN: -2 NG/L
4HR DELTA HS TROPONIN: -1 NG/L
ALBUMIN SERPL BCG-MCNC: 2.9 G/DL (ref 3.5–5)
ALP SERPL-CCNC: 65 U/L (ref 34–104)
ALT SERPL W P-5'-P-CCNC: 22 U/L (ref 7–52)
ANION GAP SERPL CALCULATED.3IONS-SCNC: 6 MMOL/L (ref 4–13)
AST SERPL W P-5'-P-CCNC: 19 U/L (ref 13–39)
BASOPHILS # BLD AUTO: 0.01 THOUSANDS/ÂΜL (ref 0–0.1)
BASOPHILS NFR BLD AUTO: 0 % (ref 0–1)
BILIRUB SERPL-MCNC: 0.35 MG/DL (ref 0.2–1)
BUN SERPL-MCNC: 21 MG/DL (ref 5–25)
CALCIUM ALBUM COR SERPL-MCNC: 8.6 MG/DL (ref 8.3–10.1)
CALCIUM SERPL-MCNC: 7.7 MG/DL (ref 8.4–10.2)
CARDIAC TROPONIN I PNL SERPL HS: 10 NG/L
CARDIAC TROPONIN I PNL SERPL HS: 11 NG/L
CARDIAC TROPONIN I PNL SERPL HS: 9 NG/L
CHLORIDE SERPL-SCNC: 100 MMOL/L (ref 96–108)
CO2 SERPL-SCNC: 37 MMOL/L (ref 21–32)
CREAT SERPL-MCNC: 1.22 MG/DL (ref 0.6–1.3)
EOSINOPHIL # BLD AUTO: 0.06 THOUSAND/ÂΜL (ref 0–0.61)
EOSINOPHIL NFR BLD AUTO: 1 % (ref 0–6)
ERYTHROCYTE [DISTWIDTH] IN BLOOD BY AUTOMATED COUNT: 17.3 % (ref 11.6–15.1)
FLUAV AG UPPER RESP QL IA.RAPID: NEGATIVE
FLUBV AG UPPER RESP QL IA.RAPID: NEGATIVE
GFR SERPL CREATININE-BSD FRML MDRD: 53 ML/MIN/1.73SQ M
GLUCOSE SERPL-MCNC: 162 MG/DL (ref 65–140)
GLUCOSE SERPL-MCNC: 78 MG/DL (ref 65–140)
GLUCOSE SERPL-MCNC: 96 MG/DL (ref 65–140)
HCT VFR BLD AUTO: 31.9 % (ref 34.8–46.1)
HGB BLD-MCNC: 9.3 G/DL (ref 11.5–15.4)
IMM GRANULOCYTES # BLD AUTO: 0.02 THOUSAND/UL (ref 0–0.2)
IMM GRANULOCYTES NFR BLD AUTO: 0 % (ref 0–2)
LYMPHOCYTES # BLD AUTO: 1.71 THOUSANDS/ÂΜL (ref 0.6–4.47)
LYMPHOCYTES NFR BLD AUTO: 25 % (ref 14–44)
MCH RBC QN AUTO: 27.4 PG (ref 26.8–34.3)
MCHC RBC AUTO-ENTMCNC: 29.2 G/DL (ref 31.4–37.4)
MCV RBC AUTO: 94 FL (ref 82–98)
MONOCYTES # BLD AUTO: 0.67 THOUSAND/ÂΜL (ref 0.17–1.22)
MONOCYTES NFR BLD AUTO: 10 % (ref 4–12)
NEUTROPHILS # BLD AUTO: 4.4 THOUSANDS/ÂΜL (ref 1.85–7.62)
NEUTS SEG NFR BLD AUTO: 64 % (ref 43–75)
NRBC BLD AUTO-RTO: 0 /100 WBCS
PLATELET # BLD AUTO: 138 THOUSANDS/UL (ref 149–390)
PMV BLD AUTO: 10.3 FL (ref 8.9–12.7)
POTASSIUM SERPL-SCNC: 4.1 MMOL/L (ref 3.5–5.3)
PROT SERPL-MCNC: 5.4 G/DL (ref 6.4–8.4)
RBC # BLD AUTO: 3.39 MILLION/UL (ref 3.81–5.12)
SARS-COV+SARS-COV-2 AG RESP QL IA.RAPID: NEGATIVE
SODIUM SERPL-SCNC: 143 MMOL/L (ref 135–147)
WBC # BLD AUTO: 6.87 THOUSAND/UL (ref 4.31–10.16)

## 2024-11-10 PROCEDURE — 87804 INFLUENZA ASSAY W/OPTIC: CPT | Performed by: EMERGENCY MEDICINE

## 2024-11-10 PROCEDURE — 87811 SARS-COV-2 COVID19 W/OPTIC: CPT | Performed by: EMERGENCY MEDICINE

## 2024-11-10 PROCEDURE — 94640 AIRWAY INHALATION TREATMENT: CPT

## 2024-11-10 PROCEDURE — 99223 1ST HOSP IP/OBS HIGH 75: CPT | Performed by: FAMILY MEDICINE

## 2024-11-10 PROCEDURE — 99285 EMERGENCY DEPT VISIT HI MDM: CPT | Performed by: EMERGENCY MEDICINE

## 2024-11-10 PROCEDURE — 85025 COMPLETE CBC W/AUTO DIFF WBC: CPT | Performed by: EMERGENCY MEDICINE

## 2024-11-10 PROCEDURE — 80197 ASSAY OF TACROLIMUS: CPT | Performed by: EMERGENCY MEDICINE

## 2024-11-10 PROCEDURE — 84484 ASSAY OF TROPONIN QUANT: CPT | Performed by: EMERGENCY MEDICINE

## 2024-11-10 PROCEDURE — 82948 REAGENT STRIP/BLOOD GLUCOSE: CPT

## 2024-11-10 PROCEDURE — 96374 THER/PROPH/DIAG INJ IV PUSH: CPT

## 2024-11-10 PROCEDURE — 71045 X-RAY EXAM CHEST 1 VIEW: CPT

## 2024-11-10 PROCEDURE — 70491 CT SOFT TISSUE NECK W/DYE: CPT

## 2024-11-10 PROCEDURE — 80053 COMPREHEN METABOLIC PANEL: CPT | Performed by: EMERGENCY MEDICINE

## 2024-11-10 PROCEDURE — 96376 TX/PRO/DX INJ SAME DRUG ADON: CPT

## 2024-11-10 PROCEDURE — 36415 COLL VENOUS BLD VENIPUNCTURE: CPT | Performed by: EMERGENCY MEDICINE

## 2024-11-10 PROCEDURE — 99284 EMERGENCY DEPT VISIT MOD MDM: CPT

## 2024-11-10 PROCEDURE — 71260 CT THORAX DX C+: CPT

## 2024-11-10 PROCEDURE — 93005 ELECTROCARDIOGRAM TRACING: CPT

## 2024-11-10 PROCEDURE — 96375 TX/PRO/DX INJ NEW DRUG ADDON: CPT

## 2024-11-10 PROCEDURE — 96361 HYDRATE IV INFUSION ADD-ON: CPT

## 2024-11-10 RX ORDER — ATOVAQUONE 750 MG/5ML
1500 SUSPENSION ORAL DAILY
Status: DISCONTINUED | OUTPATIENT
Start: 2024-11-11 | End: 2024-11-18 | Stop reason: HOSPADM

## 2024-11-10 RX ORDER — HYDROMORPHONE HCL/PF 1 MG/ML
0.5 SYRINGE (ML) INJECTION ONCE
Status: COMPLETED | OUTPATIENT
Start: 2024-11-10 | End: 2024-11-10

## 2024-11-10 RX ORDER — ACETAMINOPHEN 325 MG/1
975 TABLET ORAL EVERY 8 HOURS SCHEDULED
Status: DISCONTINUED | OUTPATIENT
Start: 2024-11-10 | End: 2024-11-18 | Stop reason: HOSPADM

## 2024-11-10 RX ORDER — TACROLIMUS 0.5 MG/1
2 CAPSULE ORAL 2 TIMES DAILY
Status: DISCONTINUED | OUTPATIENT
Start: 2024-11-10 | End: 2024-11-18 | Stop reason: HOSPADM

## 2024-11-10 RX ORDER — IPRATROPIUM BROMIDE AND ALBUTEROL SULFATE 2.5; .5 MG/3ML; MG/3ML
3 SOLUTION RESPIRATORY (INHALATION) ONCE
Status: COMPLETED | OUTPATIENT
Start: 2024-11-10 | End: 2024-11-10

## 2024-11-10 RX ORDER — DIPHENHYDRAMINE HYDROCHLORIDE 50 MG/ML
25 INJECTION INTRAMUSCULAR; INTRAVENOUS EVERY 6 HOURS PRN
Status: DISCONTINUED | OUTPATIENT
Start: 2024-11-10 | End: 2024-11-18 | Stop reason: HOSPADM

## 2024-11-10 RX ORDER — FAMOTIDINE 10 MG/ML
20 INJECTION, SOLUTION INTRAVENOUS ONCE
Status: COMPLETED | OUTPATIENT
Start: 2024-11-10 | End: 2024-11-10

## 2024-11-10 RX ORDER — VALACYCLOVIR HYDROCHLORIDE 500 MG/1
500 TABLET, FILM COATED ORAL DAILY
Status: DISCONTINUED | OUTPATIENT
Start: 2024-11-11 | End: 2024-11-18 | Stop reason: HOSPADM

## 2024-11-10 RX ORDER — OXYCODONE HYDROCHLORIDE 10 MG/1
10 TABLET ORAL EVERY 4 HOURS PRN
Status: DISCONTINUED | OUTPATIENT
Start: 2024-11-10 | End: 2024-11-12

## 2024-11-10 RX ORDER — ALPRAZOLAM 0.5 MG
1 TABLET ORAL
Status: DISCONTINUED | OUTPATIENT
Start: 2024-11-10 | End: 2024-11-18 | Stop reason: HOSPADM

## 2024-11-10 RX ORDER — ALBUTEROL SULFATE 0.83 MG/ML
5 SOLUTION RESPIRATORY (INHALATION) EVERY 4 HOURS PRN
Status: DISCONTINUED | OUTPATIENT
Start: 2024-11-10 | End: 2024-11-18 | Stop reason: HOSPADM

## 2024-11-10 RX ORDER — INSULIN GLARGINE-YFGN 100 [IU]/ML
12 INJECTION, SOLUTION SUBCUTANEOUS EVERY MORNING
Status: DISCONTINUED | OUTPATIENT
Start: 2024-11-11 | End: 2024-11-10

## 2024-11-10 RX ORDER — PANTOPRAZOLE SODIUM 40 MG/10ML
40 INJECTION, POWDER, LYOPHILIZED, FOR SOLUTION INTRAVENOUS ONCE
Status: COMPLETED | OUTPATIENT
Start: 2024-11-10 | End: 2024-11-10

## 2024-11-10 RX ORDER — ACETAMINOPHEN 10 MG/ML
1000 INJECTION, SOLUTION INTRAVENOUS ONCE
Status: COMPLETED | OUTPATIENT
Start: 2024-11-10 | End: 2024-11-10

## 2024-11-10 RX ORDER — INSULIN GLARGINE 100 [IU]/ML
12 INJECTION, SOLUTION SUBCUTANEOUS EVERY MORNING
Status: DISCONTINUED | OUTPATIENT
Start: 2024-11-11 | End: 2024-11-18 | Stop reason: HOSPADM

## 2024-11-10 RX ORDER — METHYLPREDNISOLONE SODIUM SUCCINATE 40 MG/ML
40 INJECTION, POWDER, LYOPHILIZED, FOR SOLUTION INTRAMUSCULAR; INTRAVENOUS ONCE
Status: COMPLETED | OUTPATIENT
Start: 2024-11-10 | End: 2024-11-10

## 2024-11-10 RX ORDER — BUDESONIDE AND FORMOTEROL FUMARATE DIHYDRATE 160; 4.5 UG/1; UG/1
2 AEROSOL RESPIRATORY (INHALATION) EVERY 12 HOURS
Status: DISCONTINUED | OUTPATIENT
Start: 2024-11-10 | End: 2024-11-18 | Stop reason: HOSPADM

## 2024-11-10 RX ORDER — INSULIN LISPRO 100 [IU]/ML
1-5 INJECTION, SOLUTION INTRAVENOUS; SUBCUTANEOUS
Status: DISCONTINUED | OUTPATIENT
Start: 2024-11-11 | End: 2024-11-11

## 2024-11-10 RX ORDER — AZITHROMYCIN 250 MG/1
250 TABLET, FILM COATED ORAL 3 TIMES WEEKLY
Status: DISCONTINUED | OUTPATIENT
Start: 2024-11-11 | End: 2024-11-18 | Stop reason: HOSPADM

## 2024-11-10 RX ORDER — FAMOTIDINE 20 MG/1
40 TABLET, FILM COATED ORAL
Status: DISCONTINUED | OUTPATIENT
Start: 2024-11-10 | End: 2024-11-18 | Stop reason: HOSPADM

## 2024-11-10 RX ORDER — PREGABALIN 75 MG/1
150 CAPSULE ORAL 2 TIMES DAILY
Status: DISCONTINUED | OUTPATIENT
Start: 2024-11-10 | End: 2024-11-18 | Stop reason: HOSPADM

## 2024-11-10 RX ADMIN — PANTOPRAZOLE SODIUM 40 MG: 40 INJECTION, POWDER, FOR SOLUTION INTRAVENOUS at 14:35

## 2024-11-10 RX ADMIN — APIXABAN 5 MG: 5 TABLET, FILM COATED ORAL at 20:21

## 2024-11-10 RX ADMIN — IOHEXOL 100 ML: 350 INJECTION, SOLUTION INTRAVENOUS at 15:41

## 2024-11-10 RX ADMIN — IPRATROPIUM BROMIDE AND ALBUTEROL SULFATE 3 ML: 2.5; .5 SOLUTION RESPIRATORY (INHALATION) at 18:28

## 2024-11-10 RX ADMIN — SODIUM CHLORIDE 1000 ML: 0.9 INJECTION, SOLUTION INTRAVENOUS at 16:24

## 2024-11-10 RX ADMIN — ACETAMINOPHEN 1000 MG: 10 INJECTION INTRAVENOUS at 16:28

## 2024-11-10 RX ADMIN — TACROLIMUS 2 MG: 0.5 CAPSULE ORAL at 20:21

## 2024-11-10 RX ADMIN — HYDROMORPHONE HYDROCHLORIDE 0.5 MG: 1 INJECTION, SOLUTION INTRAMUSCULAR; INTRAVENOUS; SUBCUTANEOUS at 16:25

## 2024-11-10 RX ADMIN — OXYCODONE HYDROCHLORIDE 10 MG: 10 TABLET ORAL at 20:32

## 2024-11-10 RX ADMIN — ACETAMINOPHEN 975 MG: 325 TABLET, FILM COATED ORAL at 23:16

## 2024-11-10 RX ADMIN — FAMOTIDINE 40 MG: 20 TABLET, FILM COATED ORAL at 23:15

## 2024-11-10 RX ADMIN — HYDROMORPHONE HYDROCHLORIDE 0.5 MG: 1 INJECTION, SOLUTION INTRAMUSCULAR; INTRAVENOUS; SUBCUTANEOUS at 18:24

## 2024-11-10 RX ADMIN — FAMOTIDINE 20 MG: 10 INJECTION, SOLUTION INTRAVENOUS at 14:34

## 2024-11-10 RX ADMIN — METHYLPREDNISOLONE SODIUM SUCCINATE 40 MG: 40 INJECTION, POWDER, FOR SOLUTION INTRAMUSCULAR; INTRAVENOUS at 20:21

## 2024-11-10 RX ADMIN — PREGABALIN 150 MG: 75 CAPSULE ORAL at 20:21

## 2024-11-10 NOTE — ED PROVIDER NOTES
Time reflects when diagnosis was documented in both MDM as applicable and the Disposition within this note       Time User Action Codes Description Comment    11/10/2024  6:39 PM Jo Rondon Add [T78.3XXA] Angioedema, initial encounter     11/10/2024  6:40 PM Jo Rondon Add [R05.8] Productive cough     11/10/2024  6:40 PM RondonJo Add [D84.9] Immunosuppression (HCC)     11/10/2024  6:40 PM RondonJo Add [Z94.2] Hx of lung transplant (HCC)           ED Disposition       ED Disposition   Admit    Condition   Stable    Date/Time   Sun Nov 10, 2024  7:43 PM    Comment   Case was discussed with KORY and the patient's admission status was agreed to be Admission Status: observation status to the service of Dr. Taylor.               Assessment & Plan       Medical Decision Making  48-year-old female presents for evaluation with facial swelling, difficulty swallowing, a productive cough, and chest discomfort.  On exam, patient with normal vitals, but she has ill-appearing.  Patient's face and neck noted to be mildly edematous with some periorbital swelling.  Patient with normal phonation, no stridor or respiratory distress.  At this time, no concerns for acute airway compromise.  Will obtain CT scan of the soft tissues of the neck and chest to evaluate for possible obstructive pathology causing edema.  Will also obtain basic labs, viral panel, and cardiac workup given patient's productive cough and chest discomfort.  Patient given IV analgesia and Protonix and Pepcid for symptomatic treatment.    Patient's EKG shows a normal sinus rhythm with nonspecific T wave changes in leads V2 and V3, otherwise negative for acute ischemic changes.  Troponin negative.  Viral panel negative as well.  CBC and CMP unremarkable.  CT scan of the neck negative for acute pathology.  CT scan of the chest showed scattered small clusters of tree-in-bud nodules concerning for infectious versus inflammatory airway disease, esophageal wall  thickening, and a possible new pulmonary nodule.    On reevaluation, patient reporting persistent productive cough, now also endorsing some shortness of breath and wheezing.  Patient noted to have diffuse wheezing throughout all lung fields on repeat exam, DuoNeb ordered for symptomatic treatment.  Sputum culture ordered for evaluation at this time as well.  I spoke with the patient's transplant team regarding her current presentation and ER workup today.  They agree with the patient being admitted for observation for her facial swelling, only recommended treating with antibiotics if sputum cultures had positive bacterial growth.  Patient admitted to medicine service in stable condition.    Amount and/or Complexity of Data Reviewed  Labs: ordered.  Radiology: ordered and independent interpretation performed.    Risk  Prescription drug management.  Decision regarding hospitalization.        ED Course as of 11/10/24 1943   Sun Nov 10, 2024   1338 Temperature: 99.2 °F (37.3 °C)   1450 Procedure Note: EKG  Date/Time: 11/10/24 2:50 PM   Interpreted by: Jo Rondon D.O.  Indications / Diagnosis: CP  ECG reviewed by me, the ED Provider: yes   The EKG demonstrates:  Rhythm: normal sinus  Intervals: normal intervals  Axis: normal axis  QRS/Blocks: normal QRS  ST Changes: No STD/ELVIE. Non-specific T-wave changes in V2-3.        Medications   acetaminophen (TYLENOL) tablet 975 mg (has no administration in time range)   albuterol inhalation solution 5 mg (has no administration in time range)   ALPRAZolam (XANAX) tablet 1 mg (has no administration in time range)   budesonide-formoterol (SYMBICORT) 160-4.5 mcg/act inhaler 2 puff (has no administration in time range)   famotidine (PEPCID) tablet 40 mg (has no administration in time range)   pregabalin (LYRICA) capsule 150 mg (has no administration in time range)   apixaban (ELIQUIS) tablet 5 mg (has no administration in time range)   atovaquone (MEPRON) oral suspension 1,500 mg (has  no administration in time range)   azithromycin (ZITHROMAX) tablet 250 mg (has no administration in time range)   oxyCODONE (ROXICODONE) immediate release tablet 10 mg (has no administration in time range)   tacrolimus (PROGRAF) capsule 2 mg (has no administration in time range)   valACYclovir (VALTREX) tablet 500 mg (has no administration in time range)   insulin glargine (LANTUS) subcutaneous injection 12 Units 0.12 mL (has no administration in time range)   methylPREDNISolone sodium succinate (Solu-MEDROL) injection 40 mg (has no administration in time range)   predniSONE tablet 15 mg (has no administration in time range)   diphenhydrAMINE (BENADRYL) injection 25 mg (has no administration in time range)   Famotidine (PF) (PEPCID) injection 20 mg (20 mg Intravenous Given 11/10/24 1434)   pantoprazole (PROTONIX) injection 40 mg (40 mg Intravenous Given 11/10/24 1435)   iohexol (OMNIPAQUE) 350 MG/ML injection (MULTI-DOSE) 100 mL (100 mL Intravenous Given 11/10/24 1541)   sodium chloride 0.9 % bolus 1,000 mL (0 mL Intravenous Stopped 11/10/24 1851)   acetaminophen (Ofirmev) injection 1,000 mg (0 mg Intravenous Stopped 11/10/24 1643)   HYDROmorphone (DILAUDID) injection 0.5 mg (0.5 mg Intravenous Given 11/10/24 1625)   ipratropium-albuterol (DUO-NEB) 0.5-2.5 mg/3 mL inhalation solution 3 mL (3 mL Nebulization Given 11/10/24 1828)   HYDROmorphone (DILAUDID) injection 0.5 mg (0.5 mg Intravenous Given 11/10/24 1824)       ED Risk Strat Scores                           SBIRT 20yo+      Flowsheet Row Most Recent Value   Initial Alcohol Screen: US AUDIT-C     1. How often do you have a drink containing alcohol? 0 Filed at: 11/10/2024 1632   2. How many drinks containing alcohol do you have on a typical day you are drinking?  0 Filed at: 11/10/2024 1632   3a. Male UNDER 65: How often do you have five or more drinks on one occasion? 0 Filed at: 11/10/2024 1632   3b. FEMALE Any Age, or MALE 65+: How often do you have 4 or more  drinks on one occassion? 0 Filed at: 11/10/2024 1632   Audit-C Score 0 Filed at: 11/10/2024 1632   NICOLE: How many times in the past year have you...    Used an illegal drug or used a prescription medication for non-medical reasons? Never Filed at: 11/10/2024 1632                            History of Present Illness       Chief Complaint   Patient presents with    Facial Swelling     Pt woke up this morning with facial swelling and redness and trouble swallowing. No breathing compromised. 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 Pt has generalized weakness. Pt had fever this morning and took tylenol at 8am       Past Medical History:   Diagnosis Date    Abnormal uterine bleeding (AUB)     ABPA (allergic bronchopulmonary aspergillosis) ([facility]) 07/08/2012    Acute kidney injury (HCC) 09/08/2017    Acute on chronic respiratory failure ([facility]) 03/04/2017    Last Assessment & Plan:  Formatting of this note might be different from the original. Recurrent with clinical worsening with enlarging PTX, s/p multiple chest tubes, but new fevers 6/19 and worsening leukocytosis indicating infection as cause of 6/19 decompensation with profound hypoxia in setting of shunting through right lung and continued pneumothorax s/p 2nd chest tube placement.  6/27: Right    Anaphylaxis 06/06/2022    Anemia     Asthma     Chest pain 09/21/2021    Chronic pain     Chronic respiratory failure with hypoxia ([facility]) 09/07/2021    COVID-19 06/03/2022    Cystic fibrosis ([facility])     Deep vein thrombosis (HCC)     Diabetes mellitus ([facility])     Fever 09/22/2024    GERD (gastroesophageal reflux disease)     HBP (high blood pressure)     HCAP (healthcare-associated pneumonia) 03/06/2020    History of transfusion     Influenza B 02/23/2020    Kidney stone     Moderate protein-calorie malnutrition (HCC) 01/08/2022    Neutropenic fever  ([facility]) 09/26/2020    Pneumonia due to Pseudomonas species ([facility]) 10/28/2010    Last Assessment & Plan:  Formatting of this  note might be different from the original. Polymicrobial pna w/ pseudomonas, M. Abscessus, Cadida glabrata/albicans    PONV (postoperative nausea and vomiting)     Pseudomonas aeruginosa infection 09/20/2012    Renal calculus     Squamous cell skin cancer 01/12/2023    SCCIS- Left upper thigh    Transplant recipient     Lung- 2013, 2017    Ulcerative colitis (HCC)     Wellness examination 02/04/2013    Formatting of this note might be different from the original. Discussed with patient and acknowledgement form signed on 2/4/2013 (ALYSHA)      Past Surgical History:   Procedure Laterality Date    APPENDECTOMY      AUGMENTATION MAMMAPLASTY Bilateral 2004    AUGMENTATION MAMMAPLASTY Right 2021    removed     BREAST SURGERY      Aug.     BRONCHOSCOPY      CHOLECYSTECTOMY      COLONOSCOPY      EGD  07/2019    Yeast Infect.     FL RETROGRADE PYELOGRAM  01/09/2020    FL RETROGRADE PYELOGRAM  02/04/2020    FL RETROGRADE PYELOGRAM  9/20/2024    GASTROSCOPY  03/01/2024    GASTROSTOMY TUBE, PLACE      IR BALLOON-OCCLUDED ANTEGRADE TRANSVENOUS OBLITERATION (BATO)  11/22/2022    IR PORT PLACEMENT  03/22/2022    IR PORT REMOVAL  01/10/2022    IR PORT STRIPPING  11/25/2022    IR TUNNELED CENTRAL LINE PLACEMENT  01/13/2022    LUNG SURGERY      LUNG TRANSPLANT      X2    WY CYSTO BLADDER W/URETERAL CATHETERIZATION Right 01/09/2020    Procedure: CYSTOSCOPY RETROGRADE PYELOGRAM WITH INSERTION STENT URETERAL;  Surgeon: Patricio Perry MD;  Location: [facility];  Service: Urology    WY CYSTO/URETERO W/LITHOTRIPSY &INDWELL STENT INSRT Right 02/04/2020    Procedure: CYSTOSCOPY URETEROSCOPY WITH LITHOTRIPSY HOLMIUM LASER, RETROGRADE PYELOGRAM AND INSERTION STENT URETERAL;  Surgeon: Patricio Perry MD;  Location: [facility];  Service: Urology    WY CYSTO/URETERO W/LITHOTRIPSY &INDWELL STENT INSRT Left 9/20/2024    Procedure: CYSTOSCOPY URETEROSCOPY WITH LITHOTRIPSY HOLMIUM LASER, RETROGRADE PYELOGRAM AND INSERTION STENT URETERAL,  BASKET STONE EXTRACTION;  Surgeon: Lee Briceno MD;  Location: [facility];  Service: Urology    SKIN BIOPSY      US GUIDED VASCULAR ACCESS  2017      Family History   Problem Relation Age of Onset    Alcohol abuse Mother     Mental illness Mother     Depression Mother     Alcohol abuse Father     COPD Maternal Grandmother     Lung cancer Maternal Grandmother 65    Cancer Maternal Grandmother     COPD Maternal Grandfather     Cancer Maternal Grandfather     COPD Paternal Grandmother     Lung cancer Paternal Grandmother 65    No Known Problems Sister     No Known Problems Paternal Aunt     Breast cancer Neg Hx     Ovarian cancer Neg Hx     Colon cancer Neg Hx       Social History     Tobacco Use    Smoking status: Former     Current packs/day: 0.00     Average packs/day: 0.5 packs/day for 12.0 years (6.0 ttl pk-yrs)     Types: Cigarettes     Start date: 1993     Quit date: 2005     Years since quittin.8     Passive exposure: Past    Smokeless tobacco: Never   Vaping Use    Vaping status: Never Used   Substance Use Topics    Alcohol use: Yes     Alcohol/week: 2.0 standard drinks of alcohol     Types: 2 Glasses of wine per week     Comment: social    Drug use: Yes     Frequency: 2.0 times per week     Types: Marijuana     Comment: medical edible marijuana prescribed      E-Cigarette/Vaping    E-Cigarette Use Never User       E-Cigarette/Vaping Substances    Nicotine No     THC No     CBD No     Flavoring No     Other No     Unknown No       I have reviewed and agree with the history as documented.     48-year-old female with a past medical history of cystic fibrosis with previous lung transplant currently and chronic rejection and chronic hypoxic respiratory failure presents for evaluation with facial swelling.  Patient states that she woke up this morning and noticed that her face and neck were swollen.  She endorses some associated trouble swallowing, but denies any difficulty breathing.  Patient  states that she has had 1 episode similar to this previously, but it resolved on its own.  She denies any known allergen exposures.  Denies any changes in medications.  Patient states that she believes she also had a low-grade fever this morning and did take Tylenol prior to arrival.  She states that she has been having a cough productive of thick, green sputum.  She has also been experiencing discomfort in her lower chest and worsening heart-burn. She denies any new or worsening shortness of breath.  Denies any associated abdominal pain, vomiting, or other concerning symptoms.        Review of Systems   Constitutional:  Positive for fever.   HENT:  Positive for facial swelling and trouble swallowing.    Respiratory:  Positive for cough. Negative for shortness of breath.    Cardiovascular:  Positive for chest pain.   Gastrointestinal:  Negative for abdominal pain and vomiting.   All other systems reviewed and are negative.          Objective       ED Triage Vitals   Temperature Pulse Blood Pressure Respirations SpO2 Patient Position - Orthostatic VS   11/10/24 1330 11/10/24 1330 11/10/24 1330 11/10/24 1330 11/10/24 1330 11/10/24 1330   99.2 °F (37.3 °C) 83 113/59 18 97 % Sitting      Temp Source Heart Rate Source BP Location FiO2 (%) Pain Score    11/10/24 1330 11/10/24 1330 11/10/24 1330 -- 11/10/24 1625    Oral Monitor Left arm  8      Vitals      Date and Time Temp Pulse SpO2 Resp BP Pain Score FACES Pain Rating User   11/10/24 1900 -- 72 98 % 22 116/72 -- -- JK   11/10/24 1830 -- 68 100 % 20 111/57 -- -- AA   11/10/24 1824 -- -- -- -- -- 7 -- AA   11/10/24 1800 -- 68 99 % 20 115/69 -- -- AA   11/10/24 1730 -- 66 100 % 20 105/64 -- -- AA   11/10/24 1700 -- 67 100 % 22 104/64 -- -- AA   11/10/24 1630 -- 71 99 % 20 103/57 -- -- AA   11/10/24 1625 -- -- -- -- -- 8 -- AA   11/10/24 1443 -- 75 97 % 18 91/51 -- -- JV   11/10/24 1330 99.2 °F (37.3 °C) 83 97 % 18 113/59 -- -- RO            Physical Exam  Vitals and  nursing note reviewed.   Constitutional:       General: She is awake. She is not in acute distress.     Appearance: She is ill-appearing. She is not toxic-appearing.   HENT:      Head: Normocephalic and atraumatic.      Comments: Patient's face and neck noted to be mildly edematous with some periorbital swelling bilaterally as well.     Mouth/Throat:      Lips: Pink.      Mouth: Mucous membranes are moist.   Eyes:      General: Vision grossly intact. Gaze aligned appropriately.      Conjunctiva/sclera: Conjunctivae normal.   Neck:      Trachea: Phonation normal.   Cardiovascular:      Rate and Rhythm: Normal rate and regular rhythm.      Heart sounds: Normal heart sounds.   Pulmonary:      Effort: Pulmonary effort is normal. No respiratory distress.      Breath sounds: No stridor.   Musculoskeletal:      Cervical back: Full passive range of motion without pain and neck supple. Edema present.   Skin:     General: Skin is warm and dry.   Neurological:      General: No focal deficit present.      Mental Status: She is alert and oriented to person, place, and time.       Results Reviewed       Procedure Component Value Units Date/Time    Fingerstick Glucose (POCT) [[id number]]  (Normal) Collected: 11/10/24 1938    Lab Status: Final result Specimen: Blood Updated: 11/10/24 1939     POC Glucose 96 mg/dl     HS Troponin I 4hr [[id number]]  (Normal) Collected: 11/10/24 1835    Lab Status: Final result Specimen: Blood from Central Venous Line Updated: 11/10/24 1906     hs TnI 4hr 10 ng/L      Delta 4hr hsTnI -1 ng/L     Sputum culture and Gram stain [[id number]]     Lab Status: No result Specimen: Sputum     HS Troponin I 2hr [[id number]]  (Normal) Collected: 11/10/24 1624    Lab Status: Final result Specimen: Blood from Central Venous Line Updated: 11/10/24 1702     hs TnI 2hr 9 ng/L      Delta 2hr hsTnI -2 ng/L     HS Troponin 0hr (reflex protocol) [[id number]]  (Normal) Collected: 11/10/24 1411    Lab Status: Final result  Specimen: Blood from Artery Updated: 11/10/24 1448     hs TnI 0hr 11 ng/L     COVID-19/ Infleunza A/B Rapid Anitgen(30 min. TAT) [[id number]]  (Normal) Collected: 11/10/24 1411    Lab Status: Final result Specimen: Nares from Nose Updated: 11/10/24 1445     SARS COV Rapid Antigen Negative     Influenza A Rapid Antigen Negative     Influenza B Rapid Antigen Negative    Narrative:      This test has been performed using the cVidya Nel 2 FLU+SARS Antigen test under the Emergency Use Authorization (EUA). This test has been validated by the  and verified by the performing laboratory. The Nel uses lateral flow immunofluorescent sandwich assay to detect SARS-COV, Influenza A and Influenza B Antigen.     The Quidel Nel 2 SARS Antigen test does not differentiate between SARS-CoV and SARS-CoV-2.     Negative results are presumptive and may be confirmed with a molecular assay, if necessary, for patient management. Negative results do not rule out SARS-CoV-2 or influenza infection and should not be used as the sole basis for treatment or patient management decisions. A negative test result may occur if the level of antigen in a sample is below the limit of detection of this test.     Positive results are indicative of the presence of viral antigens, but do not rule out bacterial infection or co-infection with other viruses.     All test results should be used as an adjunct to clinical observations and other information available to the provider.    FOR PEDIATRIC PATIENTS - copy/paste COVID Guidelines URL to browser: [email or web address]    Comprehensive metabolic panel [[id number]]  (Abnormal) Collected: 11/10/24 1411    Lab Status: Final result Specimen: Blood from Artery Updated: 11/10/24 1440     Sodium 143 mmol/L      Potassium 4.1 mmol/L      Chloride 100 mmol/L      CO2 37 mmol/L      ANION GAP 6 mmol/L      BUN 21 mg/dL      Creatinine 1.22 mg/dL       Glucose 78 mg/dL      Calcium 7.7 mg/dL      Corrected Calcium 8.6 mg/dL      AST 19 U/L      ALT 22 U/L      Alkaline Phosphatase 65 U/L      Total Protein 5.4 g/dL      Albumin 2.9 g/dL      Total Bilirubin 0.35 mg/dL      eGFR 53 ml/min/1.73sq m     Narrative:      National Kidney Disease Foundation guidelines for Chronic Kidney Disease (CKD):     Stage 1 with normal or high GFR (GFR > 90 mL/min/1.73 square meters)    Stage 2 Mild CKD (GFR = 60-89 mL/min/1.73 square meters)    Stage 3A Moderate CKD (GFR = 45-59 mL/min/1.73 square meters)    Stage 3B Moderate CKD (GFR = 30-44 mL/min/1.73 square meters)    Stage 4 Severe CKD (GFR = 15-29 mL/min/1.73 square meters)    Stage 5 End Stage CKD (GFR <15 mL/min/1.73 square meters)  Note: GFR calculation is accurate only with a steady state creatinine    CBC and differential [[id number]]  (Abnormal) Collected: 11/10/24 1411    Lab Status: Final result Specimen: Blood from Artery Updated: 11/10/24 1431     WBC 6.87 Thousand/uL      RBC 3.39 Million/uL      Hemoglobin 9.3 g/dL      Hematocrit 31.9 %      MCV 94 fL      MCH 27.4 pg      MCHC 29.2 g/dL      RDW 17.3 %      MPV 10.3 fL      Platelets 138 Thousands/uL      nRBC 0 /100 WBCs      Segmented % 64 %      Immature Grans % 0 %      Lymphocytes % 25 %      Monocytes % 10 %      Eosinophils Relative 1 %      Basophils Relative 0 %      Absolute Neutrophils 4.40 Thousands/µL      Absolute Immature Grans 0.02 Thousand/uL      Absolute Lymphocytes 1.71 Thousands/µL      Absolute Monocytes 0.67 Thousand/µL      Eosinophils Absolute 0.06 Thousand/µL      Basophils Absolute 0.01 Thousands/µL     Tacrolimus level [[id number]] Collected: 11/10/24 1411    Lab Status: In process Specimen: Blood from Arm, Right Updated: 11/10/24 1423            CT soft tissue neck with contrast   Final Interpretation by Thang Gregg MD (11/10 1639)      No neck mass or cervical adenopathy.      Pansinus mucosal disease.      Partially imaged  esophageal wall thickening, refer to separately dictated CT chest for further details.      Workstation performed: [id number]         CT chest with contrast   Final Interpretation by Minor Odell MD (11/10 652)      Small left pulmonary nodules most of which appear similar to prior exam, however the largest a 6 mm nodule in the left upper lobe, not definitively visualized previously however may be due to differences in technique. Based on current Fleischner Society    2017 Guidelines on incidental pulmonary nodule, follow-up non-contrast CT is recommended at 6-12 months from the initial examination and, if stable at that time, an additional follow-up is recommended for 18-24 months from the initial examination.      Scattered small clusters of tree-in-bud nodules are compatible with infectious/inflammatory airways disease.      Esophageal wall thickening, similar to slightly decreased from prior exam. Findings may be secondary to persistent esophagitis.               Workstation performed: [id number]         XR chest 1 view portable   ED Interpretation by Jo Rondon DO (11/10 4883)   Unchanged when compared to previous chest x-ray.  Right hemithorax is lulu out, and the patient's heart is rotated to the right.          Procedures    ED Medication and Procedure Management   Prior to Admission Medications   Prescriptions Last Dose Informant Patient Reported? Taking?   ALPRAZolam (XANAX) 1 mg tablet   No No   Sig: Take 1 tablet (1 mg total) by mouth daily at bedtime as needed for anxiety or sleep   Ascorbic Acid (vitamin C) 100 MG tablet  Self Yes No   Sig: Take 100 mg by mouth daily   BD Pen Needle Abi U/F 32G X 4 MM MISC  Self No No   Si times daily   Cholecalciferol (Vitamin D3) 125 MCG (5000 UT) TABS  Self Yes No   Sig: Take 5,000 Units by mouth daily   Continuous Blood Gluc  (Dexcom G7 ) SUKHI  Self No No   Sig: Use 1 each continuous   Continuous Glucose Sensor (Dexcom G7 Sensor)   Self No No   Sig: Use 1 Device every 10 days   Insulin Disposable Pump (Omnipod 5 G6 Intro, Gen 5,) KIT  Self No No   Sig: Use 1 Units continuous   Patient not taking: Reported on 10/24/2024   Insulin Disposable Pump (Omnipod 5 G6 Pods, Gen 5,) MISC  Self No No   Sig: Use 1 Units every other day   Patient not taking: Reported on 10/24/2024   Insulin Glargine-yfgn (Semglee, yfgn,) 100 UNIT/ML SOPN  Self No No   Sig: Inject 0.12 mL (12 Units total) under the skin every morning   Insulin Pen Needle (Comfort EZ Pen Needles) 33G X 4 MM MISC  Self No No   Sig: Use to inject insulin 4 times a day   Respiratory Therapy Supplies (Nebulizer) SUKHI  Self Yes No   Sig: Please dispense nebulizer machine   Turmeric 500 MG CAPS  Self Yes No   Sig: Take by mouth   acetaminophen (TYLENOL) 325 mg tablet  Self No No   Sig: Take 3 tablets (975 mg total) by mouth every 8 (eight) hours   albuterol (2.5 mg/3 mL) 0.083 % nebulizer solution  Self No No   Sig: Take 6 mL (5 mg total) by nebulization 2 (two) times a day   albuterol (PROVENTIL HFA,VENTOLIN HFA) 90 mcg/act inhaler  Self Yes No   Sig: INHALE 2 PUFFS EVERY 8 HOURS AS NEEDED FOR SHORTNESS OF BREATH OR WHEEZING.   apixaban (ELIQUIS) 5 mg  Self No No   Sig: Take 1 tablet (5 mg total) by mouth 2 (two) times a day   atovaquone (MEPRON) 750 mg/5 mL suspension  Self Yes No   Sig: Take 1,500 mg by mouth daily   azithromycin (ZITHROMAX) 250 mg tablet  Self Yes No   Sig: Take 250 mg by mouth 3 (three) times a week   bisacodyl (DULCOLAX) 5 mg EC tablet  Self No No   Sig: Take 1 tablet (5 mg total) by mouth daily at bedtime   budesonide-formoterol (SYMBICORT) 160-4.5 mcg/act inhaler  Self Yes No   Sig: Inhale 2 puffs every 12 (twelve) hours   dexlansoprazole (DEXILANT) 60 MG capsule  Self No No   Sig: TAKE 1 CAPSULE BY MOUTH EVERY DAY IN THE MORNING   dronabinol (MARINOL) 5 MG capsule  Self No No   Sig: Take 1 capsule (5 mg total) by mouth 3 (three) times a day   famotidine (PEPCID) 40 MG  tablet   No No   Sig: TAKE 1 TABLET BY MOUTH EVERYDAY AT BEDTIME   fluconazole (DIFLUCAN) 150 mg tablet   Yes No   Sig: Take 150 mg by mouth once   Patient not taking: Reported on 10/24/2024   furosemide (LASIX) 20 mg tablet  Self Yes No   Sig: Take 40 mg by mouth daily   Patient not taking: Reported on 10/26/2024   insulin aspart (NovoLOG FlexPen) 100 UNIT/ML injection pen  Self No No   Sig: INJECT 10 UNITS pre-BREAKFAST, 8 UNITS pre-LUNCH and 12 UNITS pre-DINNER plus scale; max 50 UNITS/day   multivitamin (THERAGRAN) TABS  Self Yes No   Sig: Take 1 tablet by mouth daily   naloxone (NARCAN) 4 mg/0.1 mL nasal spray  Self No No   Si.1 mL (4 mg total) into each nostril every 3 (three) minutes as needed for opioid reversal or respiratory depression   ondansetron (ZOFRAN) 4 mg tablet  Self Yes No   Sig: Take 4 mg by mouth every 8 (eight) hours as needed for nausea   oxyCODONE (ROXICODONE) 10 MG TABS   No No   Sig: Take 1 tablet (10 mg total) by mouth every 4 (four) hours as needed for moderate pain or severe pain Max Daily Amount: 60 mg   oxybutynin (DITROPAN XL) 15 MG 24 hr tablet  Self No No   Sig: Take 1 tablet (15 mg total) by mouth daily at bedtime   pancrelipase, Lip-Prot-Amyl, (CREON) 24,000 units  Self Yes No   Sig: Take 48,000 Units by mouth 3 (three) times a day with meals   polyethylene glycol (MIRALAX) 17 g packet  Self No No   Sig: Take 17 g by mouth 2 (two) times a day   Patient taking differently: Take 17 g by mouth 2 (two) times a day as needed   predniSONE 10 mg tablet  Self Yes No   Sig: Take 10 mg by mouth daily Taking 15 mg now   pregabalin (LYRICA) 150 mg capsule  Self No No   Sig: Take 1 capsule (150 mg total) by mouth 2 times a day   tacrolimus (PROGRAF) 1 mg capsule  Self No No   Sig: Take 2 capsules (2 mg total) by mouth 2 (two) times a day   tamsulosin (FLOMAX) 0.4 mg  Self No No   Sig: Take 1 capsule (0.4 mg total) by mouth daily with dinner   Patient not taking: Reported on 2024    valACYclovir (VALTREX) 500 mg tablet  Self Yes No   Sig: Take 500 mg by mouth daily   vitamin B-12 (VITAMIN B-12) 1,000 mcg tablet  Self Yes No   Sig: Take by mouth daily      Facility-Administered Medications: None     Patient's Medications   Discharge Prescriptions    No medications on file     No discharge procedures on file.  ED SEPSIS DOCUMENTATION   Time reflects when diagnosis was documented in both MDM as applicable and the Disposition within this note       Time User Action Codes Description Comment    11/10/2024  6:39 PM Jo Rondon [T78.3XXA] Angioedema, initial encounter     11/10/2024  6:40 PM Jo Rondon [R05.8] Productive cough     11/10/2024  6:40 PM Jo Rondon [D84.9] Immunosuppression ([facility])     11/10/2024  6:40 PM Jo Rondon [Z94.2] Hx of lung transplant ([facility])                  Jo Rondon, DO  11/14/24 1609

## 2024-11-11 LAB
ALBUMIN SERPL BCG-MCNC: 2.8 G/DL (ref 3.5–5)
ALP SERPL-CCNC: 68 U/L (ref 34–104)
ALT SERPL W P-5'-P-CCNC: 23 U/L (ref 7–52)
ANION GAP SERPL CALCULATED.3IONS-SCNC: 6 MMOL/L (ref 4–13)
AST SERPL W P-5'-P-CCNC: 18 U/L (ref 13–39)
ATRIAL RATE: 74 BPM
BILIRUB SERPL-MCNC: 0.33 MG/DL (ref 0.2–1)
BUN SERPL-MCNC: 20 MG/DL (ref 5–25)
CALCIUM ALBUM COR SERPL-MCNC: 8.9 MG/DL (ref 8.3–10.1)
CALCIUM SERPL-MCNC: 7.9 MG/DL (ref 8.4–10.2)
CHLORIDE SERPL-SCNC: 98 MMOL/L (ref 96–108)
CO2 SERPL-SCNC: 29 MMOL/L (ref 21–32)
CREAT SERPL-MCNC: 1.05 MG/DL (ref 0.6–1.3)
ERYTHROCYTE [DISTWIDTH] IN BLOOD BY AUTOMATED COUNT: 17.4 % (ref 11.6–15.1)
GFR SERPL CREATININE-BSD FRML MDRD: 63 ML/MIN/1.73SQ M
GLUCOSE SERPL-MCNC: 150 MG/DL (ref 65–140)
GLUCOSE SERPL-MCNC: 257 MG/DL (ref 65–140)
GLUCOSE SERPL-MCNC: 348 MG/DL (ref 65–140)
GLUCOSE SERPL-MCNC: 92 MG/DL (ref 65–140)
HCT VFR BLD AUTO: 29.8 % (ref 34.8–46.1)
HGB BLD-MCNC: 8.7 G/DL (ref 11.5–15.4)
MCH RBC QN AUTO: 27.4 PG (ref 26.8–34.3)
MCHC RBC AUTO-ENTMCNC: 29.2 G/DL (ref 31.4–37.4)
MCV RBC AUTO: 94 FL (ref 82–98)
P AXIS: 49 DEGREES
PLATELET # BLD AUTO: 132 THOUSANDS/UL (ref 149–390)
PMV BLD AUTO: 10.6 FL (ref 8.9–12.7)
POTASSIUM SERPL-SCNC: 5.1 MMOL/L (ref 3.5–5.3)
PR INTERVAL: 126 MS
PROT SERPL-MCNC: 5.4 G/DL (ref 6.4–8.4)
QRS AXIS: 64 DEGREES
QRSD INTERVAL: 72 MS
QT INTERVAL: 412 MS
QTC INTERVAL: 457 MS
RBC # BLD AUTO: 3.18 MILLION/UL (ref 3.81–5.12)
SODIUM SERPL-SCNC: 133 MMOL/L (ref 135–147)
T WAVE AXIS: 66 DEGREES
TACROLIMUS BLD-MCNC: 9.5 NG/ML (ref 3–15)
VENTRICULAR RATE: 74 BPM
WBC # BLD AUTO: 7.18 THOUSAND/UL (ref 4.31–10.16)

## 2024-11-11 PROCEDURE — 80053 COMPREHEN METABOLIC PANEL: CPT | Performed by: FAMILY MEDICINE

## 2024-11-11 PROCEDURE — 87070 CULTURE OTHR SPECIMN AEROBIC: CPT | Performed by: EMERGENCY MEDICINE

## 2024-11-11 PROCEDURE — 87106 FUNGI IDENTIFICATION YEAST: CPT | Performed by: EMERGENCY MEDICINE

## 2024-11-11 PROCEDURE — 82948 REAGENT STRIP/BLOOD GLUCOSE: CPT

## 2024-11-11 PROCEDURE — 87147 CULTURE TYPE IMMUNOLOGIC: CPT | Performed by: EMERGENCY MEDICINE

## 2024-11-11 PROCEDURE — 87181 SC STD AGAR DILUTION PER AGT: CPT | Performed by: EMERGENCY MEDICINE

## 2024-11-11 PROCEDURE — 87205 SMEAR GRAM STAIN: CPT | Performed by: EMERGENCY MEDICINE

## 2024-11-11 PROCEDURE — 93010 ELECTROCARDIOGRAM REPORT: CPT | Performed by: INTERNAL MEDICINE

## 2024-11-11 PROCEDURE — 99232 SBSQ HOSP IP/OBS MODERATE 35: CPT | Performed by: PHYSICIAN ASSISTANT

## 2024-11-11 PROCEDURE — 85027 COMPLETE CBC AUTOMATED: CPT | Performed by: FAMILY MEDICINE

## 2024-11-11 RX ORDER — HYDROMORPHONE HCL/PF 1 MG/ML
0.5 SYRINGE (ML) INJECTION EVERY 4 HOURS PRN
Status: DISCONTINUED | OUTPATIENT
Start: 2024-11-11 | End: 2024-11-13

## 2024-11-11 RX ORDER — PANTOPRAZOLE SODIUM 40 MG/1
40 TABLET, DELAYED RELEASE ORAL
Status: DISCONTINUED | OUTPATIENT
Start: 2024-11-12 | End: 2024-11-11

## 2024-11-11 RX ORDER — INSULIN LISPRO 100 [IU]/ML
2-7 INJECTION, SOLUTION INTRAVENOUS; SUBCUTANEOUS
Status: DISCONTINUED | OUTPATIENT
Start: 2024-11-11 | End: 2024-11-18 | Stop reason: HOSPADM

## 2024-11-11 RX ORDER — DICYCLOMINE HYDROCHLORIDE 10 MG/1
20 CAPSULE ORAL
Status: DISCONTINUED | OUTPATIENT
Start: 2024-11-11 | End: 2024-11-18 | Stop reason: HOSPADM

## 2024-11-11 RX ORDER — INSULIN LISPRO 100 [IU]/ML
12 INJECTION, SOLUTION INTRAVENOUS; SUBCUTANEOUS ONCE
Status: COMPLETED | OUTPATIENT
Start: 2024-11-11 | End: 2024-11-11

## 2024-11-11 RX ORDER — INSULIN LISPRO 100 [IU]/ML
10 INJECTION, SOLUTION INTRAVENOUS; SUBCUTANEOUS
Status: DISCONTINUED | OUTPATIENT
Start: 2024-11-12 | End: 2024-11-18 | Stop reason: HOSPADM

## 2024-11-11 RX ORDER — ACETAMINOPHEN 325 MG/1
650 TABLET ORAL EVERY 6 HOURS PRN
Status: DISCONTINUED | OUTPATIENT
Start: 2024-11-11 | End: 2024-11-18 | Stop reason: HOSPADM

## 2024-11-11 RX ORDER — LIDOCAINE 50 MG/G
1 PATCH TOPICAL DAILY
Status: DISCONTINUED | OUTPATIENT
Start: 2024-11-11 | End: 2024-11-13

## 2024-11-11 RX ORDER — HYDROMORPHONE HCL/PF 1 MG/ML
0.5 SYRINGE (ML) INJECTION ONCE
Status: COMPLETED | OUTPATIENT
Start: 2024-11-11 | End: 2024-11-11

## 2024-11-11 RX ORDER — INSULIN LISPRO 100 [IU]/ML
8 INJECTION, SOLUTION INTRAVENOUS; SUBCUTANEOUS
Status: DISCONTINUED | OUTPATIENT
Start: 2024-11-11 | End: 2024-11-18 | Stop reason: HOSPADM

## 2024-11-11 RX ORDER — HYDROMORPHONE HCL IN WATER/PF 6 MG/30 ML
0.2 PATIENT CONTROLLED ANALGESIA SYRINGE INTRAVENOUS EVERY 4 HOURS PRN
Status: DISCONTINUED | OUTPATIENT
Start: 2024-11-11 | End: 2024-11-11

## 2024-11-11 RX ORDER — PANTOPRAZOLE SODIUM 40 MG/1
40 TABLET, DELAYED RELEASE ORAL
Status: DISCONTINUED | OUTPATIENT
Start: 2024-11-11 | End: 2024-11-11

## 2024-11-11 RX ORDER — PANTOPRAZOLE SODIUM 40 MG/1
40 TABLET, DELAYED RELEASE ORAL
Status: DISCONTINUED | OUTPATIENT
Start: 2024-11-11 | End: 2024-11-18 | Stop reason: HOSPADM

## 2024-11-11 RX ORDER — INSULIN LISPRO 100 [IU]/ML
2-7 INJECTION, SOLUTION INTRAVENOUS; SUBCUTANEOUS
Status: DISCONTINUED | OUTPATIENT
Start: 2024-11-11 | End: 2024-11-11

## 2024-11-11 RX ORDER — INSULIN LISPRO 100 [IU]/ML
12 INJECTION, SOLUTION INTRAVENOUS; SUBCUTANEOUS
Status: DISCONTINUED | OUTPATIENT
Start: 2024-11-11 | End: 2024-11-18 | Stop reason: HOSPADM

## 2024-11-11 RX ORDER — SUCRALFATE 1 G/1
1 TABLET ORAL
Status: DISCONTINUED | OUTPATIENT
Start: 2024-11-11 | End: 2024-11-18 | Stop reason: HOSPADM

## 2024-11-11 RX ORDER — HYDROMORPHONE HCL IN WATER/PF 6 MG/30 ML
0.2 PATIENT CONTROLLED ANALGESIA SYRINGE INTRAVENOUS ONCE
Status: COMPLETED | OUTPATIENT
Start: 2024-11-11 | End: 2024-11-11

## 2024-11-11 RX ADMIN — SUCRALFATE 1 G: 1 TABLET ORAL at 08:44

## 2024-11-11 RX ADMIN — ATOVAQUONE 1500 MG: 750 SUSPENSION ORAL at 08:31

## 2024-11-11 RX ADMIN — DOXYCYCLINE 100 MG: 100 INJECTION, POWDER, LYOPHILIZED, FOR SOLUTION INTRAVENOUS at 22:02

## 2024-11-11 RX ADMIN — APIXABAN 5 MG: 5 TABLET, FILM COATED ORAL at 17:46

## 2024-11-11 RX ADMIN — DICYCLOMINE HYDROCHLORIDE 20 MG: 10 CAPSULE ORAL at 22:05

## 2024-11-11 RX ADMIN — OXYCODONE HYDROCHLORIDE 10 MG: 10 TABLET ORAL at 02:35

## 2024-11-11 RX ADMIN — INSULIN LISPRO 4 UNITS: 100 INJECTION, SOLUTION INTRAVENOUS; SUBCUTANEOUS at 12:07

## 2024-11-11 RX ADMIN — HYDROMORPHONE HYDROCHLORIDE 0.5 MG: 1 INJECTION, SOLUTION INTRAMUSCULAR; INTRAVENOUS; SUBCUTANEOUS at 08:47

## 2024-11-11 RX ADMIN — DICYCLOMINE HYDROCHLORIDE 20 MG: 10 CAPSULE ORAL at 08:44

## 2024-11-11 RX ADMIN — HYDROMORPHONE HYDROCHLORIDE 0.2 MG: 0.2 INJECTION, SOLUTION INTRAMUSCULAR; INTRAVENOUS; SUBCUTANEOUS at 18:47

## 2024-11-11 RX ADMIN — PREGABALIN 150 MG: 75 CAPSULE ORAL at 17:46

## 2024-11-11 RX ADMIN — TACROLIMUS 2 MG: 0.5 CAPSULE ORAL at 08:30

## 2024-11-11 RX ADMIN — HYDROMORPHONE HYDROCHLORIDE 0.2 MG: 0.2 INJECTION, SOLUTION INTRAMUSCULAR; INTRAVENOUS; SUBCUTANEOUS at 14:29

## 2024-11-11 RX ADMIN — LIDOCAINE 1 PATCH: 50 PATCH CUTANEOUS at 08:22

## 2024-11-11 RX ADMIN — PANTOPRAZOLE SODIUM 40 MG: 40 TABLET, DELAYED RELEASE ORAL at 08:44

## 2024-11-11 RX ADMIN — INSULIN LISPRO 12 UNITS: 100 INJECTION, SOLUTION INTRAVENOUS; SUBCUTANEOUS at 08:19

## 2024-11-11 RX ADMIN — ACETAMINOPHEN 975 MG: 325 TABLET, FILM COATED ORAL at 22:05

## 2024-11-11 RX ADMIN — OXYCODONE HYDROCHLORIDE 10 MG: 10 TABLET ORAL at 22:18

## 2024-11-11 RX ADMIN — PANCRELIPASE 48000 UNITS: 120000; 24000; 76000 CAPSULE, DELAYED RELEASE PELLETS ORAL at 11:51

## 2024-11-11 RX ADMIN — AZITHROMYCIN 250 MG: 250 TABLET, FILM COATED ORAL at 08:30

## 2024-11-11 RX ADMIN — INSULIN LISPRO 3 UNITS: 100 INJECTION, SOLUTION INTRAVENOUS; SUBCUTANEOUS at 07:34

## 2024-11-11 RX ADMIN — PANCRELIPASE 48000 UNITS: 120000; 24000; 76000 CAPSULE, DELAYED RELEASE PELLETS ORAL at 08:20

## 2024-11-11 RX ADMIN — INSULIN LISPRO 2 UNITS: 100 INJECTION, SOLUTION INTRAVENOUS; SUBCUTANEOUS at 12:06

## 2024-11-11 RX ADMIN — SUCRALFATE 1 G: 1 TABLET ORAL at 12:02

## 2024-11-11 RX ADMIN — FAMOTIDINE 40 MG: 20 TABLET, FILM COATED ORAL at 22:05

## 2024-11-11 RX ADMIN — BUDESONIDE AND FORMOTEROL FUMARATE DIHYDRATE 2 PUFF: 160; 4.5 AEROSOL RESPIRATORY (INHALATION) at 08:25

## 2024-11-11 RX ADMIN — PREDNISONE 15 MG: 5 TABLET ORAL at 08:30

## 2024-11-11 RX ADMIN — PANTOPRAZOLE SODIUM 40 MG: 40 TABLET, DELAYED RELEASE ORAL at 16:32

## 2024-11-11 RX ADMIN — PANCRELIPASE 48000 UNITS: 120000; 24000; 76000 CAPSULE, DELAYED RELEASE PELLETS ORAL at 16:31

## 2024-11-11 RX ADMIN — PREGABALIN 150 MG: 75 CAPSULE ORAL at 08:30

## 2024-11-11 RX ADMIN — APIXABAN 5 MG: 5 TABLET, FILM COATED ORAL at 08:30

## 2024-11-11 RX ADMIN — SUCRALFATE 1 G: 1 TABLET ORAL at 16:32

## 2024-11-11 RX ADMIN — INSULIN GLARGINE 12 UNITS: 100 INJECTION, SOLUTION SUBCUTANEOUS at 08:27

## 2024-11-11 RX ADMIN — INSULIN LISPRO 1 UNITS: 100 INJECTION, SOLUTION INTRAVENOUS; SUBCUTANEOUS at 11:52

## 2024-11-11 RX ADMIN — TACROLIMUS 2 MG: 0.5 CAPSULE ORAL at 17:46

## 2024-11-11 RX ADMIN — HYDROMORPHONE HYDROCHLORIDE 0.2 MG: 0.2 INJECTION, SOLUTION INTRAMUSCULAR; INTRAVENOUS; SUBCUTANEOUS at 22:55

## 2024-11-11 RX ADMIN — HYDROMORPHONE HYDROCHLORIDE 0.2 MG: 0.2 INJECTION, SOLUTION INTRAMUSCULAR; INTRAVENOUS; SUBCUTANEOUS at 05:35

## 2024-11-11 RX ADMIN — VALACYCLOVIR HYDROCHLORIDE 500 MG: 500 TABLET, FILM COATED ORAL at 08:30

## 2024-11-11 RX ADMIN — DICYCLOMINE HYDROCHLORIDE 20 MG: 10 CAPSULE ORAL at 16:32

## 2024-11-11 RX ADMIN — BUDESONIDE AND FORMOTEROL FUMARATE DIHYDRATE 2 PUFF: 160; 4.5 AEROSOL RESPIRATORY (INHALATION) at 22:03

## 2024-11-11 NOTE — ASSESSMENT & PLAN NOTE
H/o cystic fibrosis s/p lung transplant with chronic rejection on prednisone/tacrolimus, history of recurrent idiopathic angioedema presenting to the ED reporting sudden onset facial/neck swelling when she woke up this morning.  She did report some sore throat but no worsening difficulty swallowing.  She also has chronic difficulty breathing associated with her history of cystic fibrosis but no worsening than baseline.    CT soft tissue neck : neg for acute findings  CT chest wo contrast : No acute finding  In the ED, Pepcid/Dilaudid/IV fluid bolus.  Upon my examination patient reports feeling improved already  ED provider discussed with patient's transplant team at [facility] and they recommended patient be admitted here for overnight observation  IV Solu-Medrol 40 mg x 1 dose ordered.  Continue home oral prednisone 15 mg daily starting tomorrow  As needed Benadryl ordered  Overnight pulse ox ordered.

## 2024-11-11 NOTE — ASSESSMENT & PLAN NOTE
Lab Results   Component Value Date    HGBA1C 7.5 (H) 09/21/2024       Recent Labs     11/10/24  1938 11/10/24  2122 11/11/24  0606   POCGLU 96 162* 348*       Blood Sugar Average: Last 72 hrs:  (P) 202    Lantus 12 units nightly ordered  Sliding scale coverage with ACHS checks  Consistent carb diet  Hypoglycemia protocol.

## 2024-11-11 NOTE — ASSESSMENT & PLAN NOTE
History of recurrent idiopathic angioedema presented to ED with sudden onset facial/neck swelling when she woke up 11/10/24.    CT soft tissue neck no acute findings  CT chest no acute findings  CBC, CMP wnl for patient, hsTnI normal  s/p Pepcid/Dilaudid/IV fluid bolus + IV solumedrol x1 in ER; symptoms resolved at present time   ED provider discussed with patient's transplant team at [facility] and they recommended patient be admitted here for overnight observation  Continue oral prednisone 15 mg daily as per home dose   This issue appears resolved at present time

## 2024-11-11 NOTE — ASSESSMENT & PLAN NOTE
"History of cystic fibrosis s/p 2 previous lung transplant with chronic rejection on tacrolimus/prednisone. Patient reports she is \"end-stage\" at this point, and not a candidate for another transplant  Continue chronic Mepron/Zithromax/Valtrex  11/10 ED provider discussed case with [facility] transplant team on-call who did not feel transfer necessary at time of presentation  "

## 2024-11-11 NOTE — H&P
H&P - Hospitalist   Name: Maximilian Mann 48 y.o. female I MRN: [id number]  Unit/Bed#: ED 28 I Date of Admission: 11/10/2024   Date of Service: 11/10/2024 I Hospital Day: 0     Assessment & Plan  Angioedema  H/o cystic fibrosis s/p lung transplant with chronic rejection on prednisone/tacrolimus, history of recurrent idiopathic angioedema presenting to the ED reporting sudden onset facial/neck swelling when she woke up this morning.  She did report some sore throat but no worsening difficulty swallowing.  She also has chronic difficulty breathing associated with her history of cystic fibrosis but no worsening than baseline.    CT soft tissue neck : neg for acute findings  CT chest wo contrast : No acute finding  In the ED, Pepcid/Dilaudid/IV fluid bolus.  Upon my examination patient reports feeling improved already  ED provider discussed with patient's transplant team at [facility] and they recommended patient be admitted here for overnight observation  IV Solu-Medrol 40 mg x 1 dose ordered.  Continue home oral prednisone 15 mg daily starting tomorrow  As needed Benadryl ordered  Overnight pulse ox ordered.  Cystic fibrosis (HCC)  History of cystic fibrosis s/p 2 previous lung transplant with chronic rejection on tacrolimus/prednisone and follows with [facility]  Continue chronic Mepron/Zithromax/Valtrex  Please note ED provider discussed case with  transplant team on-call and they recommended patient can be admitted overnight observation status for angioedema.  No requirement for transfer.  Hx of lung transplant ([facility])  History of chronic rejection on tacrolimus/prednisone.  History of pulmonary embolus (PE)  Continue home Eliquis.  Type 1 diabetes mellitus with diabetic chronic kidney disease, unspecified CKD stage ([facility])  Lab Results   Component Value Date    HGBA1C 7.5 (H) 09/21/2024       Recent Labs     11/10/24  1938   POCGLU 96       Blood Sugar Average: Last 72 hrs:  (P) 96    Lantus 12 units nightly  ordered  Sliding scale coverage with ACHS checks  Consistent carb diet  Hypoglycemia protocol.    Chronic pain disorder  Maintained chronically on oral oxycodone 10 mg every 4 hours as needed.  GERD (gastroesophageal reflux disease)  Continue Pepcid.      VTE Pharmacologic Prophylaxis: VTE Score: 1 eliquis  Code Status: Prior     Anticipated Length of Stay: Patient will be admitted on an observation basis with an anticipated length of stay of less than 2 midnights secondary to angioedema.    History of Present Illness   Chief Complaint: facial neck swelling    Maximilian Mann is a 48 y.o. female with a PMH of history of cystic fibrosis s/p lung transplant with chronic rejection maintained on tacrolimus/prednisone who presents with sudden onset facial/neck swelling this morning.  She reports she had heartburn all night and finally fell asleep around 3 AM.  She woke up this morning and was unable to open her eyes due to facial swelling.  She also reported sore throat.  No difficulty swallowing.  No worsening difficulty breathing.  She does report a prior history of recurrent angioedema in the past which has been idiopathic.    Case was discussed by ED provider with [facility] on call and they recommended patient be admitted at our facility for overnight observation.  No requirement for transfer.    Review of Systems   Constitutional:  Negative for chills and fever.   HENT:  Negative for ear pain and sore throat.         Facial swelling   Eyes:  Negative for pain and visual disturbance.   Respiratory:  Positive for cough and shortness of breath.    Cardiovascular:  Negative for chest pain and palpitations.   Gastrointestinal:  Negative for abdominal pain and vomiting.   Genitourinary:  Negative for dysuria and hematuria.   Musculoskeletal:  Negative for arthralgias and back pain.   Skin:  Negative for color change and rash.   Neurological:  Negative for seizures and syncope.   All other systems reviewed  and are negative.      Historical Information   Past Medical History:   Diagnosis Date    Abnormal uterine bleeding (AUB)     ABPA (allergic bronchopulmonary aspergillosis) ([facility]) 07/08/2012    Acute kidney injury (HCC) 09/08/2017    Acute on chronic respiratory failure (HCC) 03/04/2017    Last Assessment & Plan:  Formatting of this note might be different from the original. Recurrent with clinical worsening with enlarging PTX, s/p multiple chest tubes, but new fevers 6/19 and worsening leukocytosis indicating infection as cause of 6/19 decompensation with profound hypoxia in setting of shunting through right lung and continued pneumothorax s/p 2nd chest tube placement.  6/27: Right    Anaphylaxis 06/06/2022    Anemia     Asthma     Chest pain 09/21/2021    Chronic pain     Chronic respiratory failure with hypoxia ([facility]) 09/07/2021    COVID-19 06/03/2022    Cystic fibrosis ([facility])     Deep vein thrombosis ([facility])     Diabetes mellitus ([facility])     Fever 09/22/2024    GERD (gastroesophageal reflux disease)     HBP (high blood pressure)     HCAP (healthcare-associated pneumonia) 03/06/2020    History of transfusion     Influenza B 02/23/2020    Kidney stone     Moderate protein-calorie malnutrition (HCC) 01/08/2022    Neutropenic fever  ([facility]) 09/26/2020    Pneumonia due to Pseudomonas species ([facility]) 10/28/2010    Last Assessment & Plan:  Formatting of this note might be different from the original. Polymicrobial pna w/ pseudomonas, M. Abscessus, Cadida glabrata/albicans    PONV (postoperative nausea and vomiting)     Pseudomonas aeruginosa infection 09/20/2012    Renal calculus     Squamous cell skin cancer 01/12/2023    SCCIS- Left upper thigh    Transplant recipient     Lung- 2013, 2017    Ulcerative colitis ([facility])     Wellness examination 02/04/2013    Formatting of this note might be different from the original. Discussed with patient and acknowledgement form signed on 2/4/2013 (ALYSHA)     Past Surgical History:   Procedure  Laterality Date    APPENDECTOMY      AUGMENTATION MAMMAPLASTY Bilateral 2004    AUGMENTATION MAMMAPLASTY Right     removed     BREAST SURGERY      Aug.     BRONCHOSCOPY      CHOLECYSTECTOMY      COLONOSCOPY      EGD  2019    [facility].     FL RETROGRADE PYELOGRAM  2020    FL RETROGRADE PYELOGRAM  2020    FL RETROGRADE PYELOGRAM  2024    GASTROSCOPY  2024    GASTROSTOMY TUBE, PLACE      IR BALLOON-OCCLUDED ANTEGRADE TRANSVENOUS OBLITERATION (BATO)  2022    IR PORT PLACEMENT  2022    IR PORT REMOVAL  01/10/2022    IR PORT STRIPPING  2022    IR TUNNELED CENTRAL LINE PLACEMENT  2022    LUNG SURGERY      LUNG TRANSPLANT      X2    NH CYSTO BLADDER W/URETERAL CATHETERIZATION Right 2020    Procedure: CYSTOSCOPY RETROGRADE PYELOGRAM WITH INSERTION STENT URETERAL;  Surgeon: Patricio Perry MD;  Location: [facility];  Service: Urology    NH CYSTO/URETERO W/LITHOTRIPSY &INDWELL STENT INSRT Right 2020    Procedure: CYSTOSCOPY URETEROSCOPY WITH LITHOTRIPSY HOLMIUM LASER, RETROGRADE PYELOGRAM AND INSERTION STENT URETERAL;  Surgeon: Patricio Perry MD;  Location: [facility];  Service: Urology    NH CYSTO/URETERO W/LITHOTRIPSY &INDWELL STENT INSRT Left 2024    Procedure: CYSTOSCOPY URETEROSCOPY WITH LITHOTRIPSY HOLMIUM LASER, RETROGRADE PYELOGRAM AND INSERTION STENT URETERAL, BASKET STONE EXTRACTION;  Surgeon: Lee Briceno MD;  Location: [facility];  Service: Urology    SKIN BIOPSY      US GUIDED VASCULAR ACCESS  2017     Social History     Tobacco Use    Smoking status: Former     Current packs/day: 0.00     Average packs/day: 0.5 packs/day for 12.0 years (6.0 ttl pk-yrs)     Types: Cigarettes     Start date: 1993     Quit date: 2005     Years since quittin.8     Passive exposure: Past    Smokeless tobacco: Never   Vaping Use    Vaping status: Never Used   Substance and Sexual Activity    Alcohol use: Yes     Alcohol/week: 2.0  standard drinks of alcohol     Types: 2 Glasses of wine per week     Comment: social    Drug use: Yes     Frequency: 2.0 times per week     Types: Marijuana     Comment: medical edible marijuana prescribed    Sexual activity: Yes     Partners: Male     Birth control/protection: None     E-Cigarette/Vaping    E-Cigarette Use Never User      E-Cigarette/Vaping Substances    Nicotine No     THC No     CBD No     Flavoring No     Other No     Unknown No      Family history non-contributory  Social History:  Marital Status: /Civil Union       Meds/Allergies   I have reviewed home medications with patient personally.  Prior to Admission medications    Medication Sig Start Date End Date Taking? Authorizing Provider   acetaminophen (TYLENOL) 325 mg tablet Take 3 tablets (975 mg total) by mouth every 8 (eight) hours 9/26/21   WAYNE Paredes   albuterol (2.5 mg/3 mL) 0.083 % nebulizer solution Take 6 mL (5 mg total) by nebulization 2 (two) times a day 9/26/21   WAYNE Paredes   albuterol (PROVENTIL HFA,VENTOLIN HFA) 90 mcg/act inhaler INHALE 2 PUFFS EVERY 8 HOURS AS NEEDED FOR SHORTNESS OF BREATH OR WHEEZING. 11/29/23   Historical Provider, MD   ALPRAZolam (XANAX) 1 mg tablet Take 1 tablet (1 mg total) by mouth daily at bedtime as needed for anxiety or sleep 10/29/24   Areli Harrington MD   apixaban (ELIQUIS) 5 mg Take 1 tablet (5 mg total) by mouth 2 (two) times a day 6/5/23 11/4/24  Solis Avila MD   Ascorbic Acid (vitamin C) 100 MG tablet Take 100 mg by mouth daily    Historical Provider, MD   atovaquone (MEPRON) 750 mg/5 mL suspension Take 1,500 mg by mouth daily    Historical Provider, MD   azithromycin (ZITHROMAX) 250 mg tablet Take 250 mg by mouth 3 (three) times a week 1/6/23   Historical Provider, MD   BD Pen Needle Abi U/F 32G X 4 MM MISC 4 times daily 6/19/24   Eve Polk MD   bisacodyl (DULCOLAX) 5 mg EC tablet Take 1 tablet (5 mg total) by mouth daily at bedtime 5/7/23   Armand Juarez MD    budesonide-formoterol (SYMBICORT) 160-4.5 mcg/act inhaler Inhale 2 puffs every 12 (twelve) hours 9/12/24   Historical Provider, MD   Cholecalciferol (Vitamin D3) 125 MCG (5000 UT) TABS Take 5,000 Units by mouth daily    Historical Provider, MD   Continuous Blood Gluc  (Dexcom G7 ) SUKHI Use 1 each continuous 7/31/23   Eve Polk MD   Continuous Glucose Sensor (Dexcom G7 Sensor) Use 1 Device every 10 days 5/20/24   Eve Polk MD   dexlansoprazole (DEXILANT) 60 MG capsule TAKE 1 CAPSULE BY MOUTH EVERY DAY IN THE MORNING 7/19/24   WAYNE Nunez   dronabinol (MARINOL) 5 MG capsule Take 1 capsule (5 mg total) by mouth 3 (three) times a day 9/11/23   Gwendolyn Nuñez MD   famotidine (PEPCID) 40 MG tablet TAKE 1 TABLET BY MOUTH EVERYDAY AT BEDTIME 10/24/24   Gwendolyn Nuñez MD   fluconazole (DIFLUCAN) 150 mg tablet Take 150 mg by mouth once  Patient not taking: Reported on 10/24/2024 8/7/24   Historical Provider, MD   furosemide (LASIX) 20 mg tablet Take 40 mg by mouth daily  Patient not taking: Reported on 10/26/2024    Historical Provider, MD   insulin aspart (NovoLOG FlexPen) 100 UNIT/ML injection pen INJECT 10 UNITS pre-BREAKFAST, 8 UNITS pre-LUNCH and 12 UNITS pre-DINNER plus scale; max 50 UNITS/day 9/19/24   Eve Polk MD   Insulin Disposable Pump (Omnipod 5 G6 Intro, Gen 5,) KIT Use 1 Units continuous  Patient not taking: Reported on 10/24/2024 3/6/24   Eve Polk MD   Insulin Disposable Pump (Omnipod 5 G6 Pods, Gen 5,) MISC Use 1 Units every other day  Patient not taking: Reported on 10/24/2024 3/6/24   Eve Polk MD   Insulin Glargine-yfgn (Semglee, yfgn,) 100 UNIT/ML SOPN Inject 0.12 mL (12 Units total) under the skin every morning 8/26/24   Eve Polk MD   Insulin Pen Needle (Comfort EZ Pen Needles) 33G X 4 MM MISC Use to inject insulin 4 times a day 11/3/22   Eve Polk MD   multivitamin (THERAGRAN) TABS Take 1  tablet by mouth daily    Historical Provider, MD   naloxone (NARCAN) 4 mg/0.1 mL nasal spray 0.1 mL (4 mg total) into each nostril every 3 (three) minutes as needed for opioid reversal or respiratory depression 6/13/24   Solis Avila MD   ondansetron (ZOFRAN) 4 mg tablet Take 4 mg by mouth every 8 (eight) hours as needed for nausea 3/21/24   Historical Provider, MD   oxybutynin (DITROPAN XL) 15 MG 24 hr tablet Take 1 tablet (15 mg total) by mouth daily at bedtime 9/3/24   Gianna Rob PA-C   oxyCODONE (ROXICODONE) 10 MG TABS Take 1 tablet (10 mg total) by mouth every 4 (four) hours as needed for moderate pain or severe pain Max Daily Amount: 60 mg 11/8/24 12/8/24  Solis Avila MD   pancrelipase, Lip-Prot-Amyl, (CREON) 24,000 units Take 48,000 Units by mouth 3 (three) times a day with meals    Historical Provider, MD   polyethylene glycol (MIRALAX) 17 g packet Take 17 g by mouth 2 (two) times a day  Patient taking differently: Take 17 g by mouth 2 (two) times a day as needed 5/7/23   Armand Juarez MD   predniSONE 10 mg tablet Take 10 mg by mouth daily Taking 15 mg now 4/27/21   Historical Provider, MD   pregabalin (LYRICA) 150 mg capsule Take 1 capsule (150 mg total) by mouth 2 times a day 7/18/24   Gwendolyn Nuñez MD   Respiratory Therapy Supplies (Nebulizer) SUKHI Please dispense nebulizer machine 7/16/21   Historical Provider, MD   tacrolimus (PROGRAF) 1 mg capsule Take 2 capsules (2 mg total) by mouth 2 (two) times a day 9/26/24   Andrea Dos Santos PA-C   tamsulosin (FLOMAX) 0.4 mg Take 1 capsule (0.4 mg total) by mouth daily with dinner  Patient not taking: Reported on 11/4/2024 9/26/24   Andrea Dos Santos PA-C   Turmeric 500 MG CAPS Take by mouth    Historical Provider, MD   valACYclovir (VALTREX) 500 mg tablet Take 500 mg by mouth daily 6/1/21   Historical Provider, MD   vitamin B-12 (VITAMIN B-12) 1,000 mcg tablet Take by mouth daily    Historical Provider, MD   cyclobenzaprine (FLEXERIL) 5 mg tablet Take 1-2  tabs every 8 hours as needed for muscle pain/spasm  Patient not taking: Reported on 10/13/2022 8/1/22 11/8/22  Solis Avila MD   traZODone (DESYREL) 50 mg tablet Take 0.5 tablets (25 mg total) by mouth daily at bedtime 9/26/21 11/8/22  WAYNE Paredes     Allergies   Allergen Reactions    Bebtelovimab Anaphylaxis    Vancomycin Angioedema and Hives    Gabapentin Hallucinations    Ceftazidime Headache     Severe headaches after desensitization    Nsaids Other (See Comments)     Lung transplant increases risk of renal toxicity, call lung txp provider to discuss if needed       Objective :  Temp:  [99.2 °F (37.3 °C)] 99.2 °F (37.3 °C)  HR:  [66-83] 72  BP: ()/(51-72) 116/72  Resp:  [18-22] 22  SpO2:  [97 %-100 %] 98 %  O2 Device: Nasal cannula  Nasal Cannula O2 Flow Rate (L/min):  [2 L/min-3 L/min] 3 L/min    Physical Exam  Vitals and nursing note reviewed.   Constitutional:       General: She is not in acute distress.     Appearance: She is well-developed. She is ill-appearing.   HENT:      Head:      Comments: Facial swelling noted.     Eyes:      Conjunctiva/sclera: Conjunctivae normal.   Cardiovascular:      Rate and Rhythm: Normal rate and regular rhythm.      Heart sounds: No murmur heard.  Pulmonary:      Effort: Pulmonary effort is normal. No respiratory distress.      Breath sounds: Normal breath sounds.   Abdominal:      Palpations: Abdomen is soft.      Tenderness: There is no abdominal tenderness.   Musculoskeletal:      Cervical back: Neck supple.   Skin:     General: Skin is warm.   Neurological:      General: No focal deficit present.      Mental Status: She is alert and oriented to person, place, and time.   Psychiatric:         Mood and Affect: Mood normal.          Lines/Drains:  Lines/Drains/Airways       Active Status       Name Placement date Placement time Site Days    Port A Cath 11/25/22 Right Chest 11/25/22  0953  Chest  716                           Lab Results: I have reviewed the  following results:  Results from last 7 days   Lab Units 11/10/24  1411   WBC Thousand/uL 6.87   HEMOGLOBIN g/dL 9.3*   HEMATOCRIT % 31.9*   PLATELETS Thousands/uL 138*   SEGS PCT % 64   LYMPHO PCT % 25   MONO PCT % 10   EOS PCT % 1     Results from last 7 days   Lab Units 11/10/24  1411   SODIUM mmol/L 143   POTASSIUM mmol/L 4.1   CHLORIDE mmol/L 100   CO2 mmol/L 37*   BUN mg/dL 21   CREATININE mg/dL 1.22   ANION GAP mmol/L 6   CALCIUM mg/dL 7.7*   ALBUMIN g/dL 2.9*   TOTAL BILIRUBIN mg/dL 0.35   ALK PHOS U/L 65   ALT U/L 22   AST U/L 19   GLUCOSE RANDOM mg/dL 78         Results from last 7 days   Lab Units 11/10/24  1938   POC GLUCOSE mg/dl 96     Lab Results   Component Value Date    HGBA1C 7.5 (H) 09/21/2024    HGBA1C 7.6 (H) 05/27/2024    HGBA1C 8.0 (A) 01/15/2024           Imaging Results Review: I reviewed radiology reports from this admission including: CT sot tissue neck, chest.      Administrative Statements   I have spent a total time of 75 minutes in caring for this patient on the day of the visit/encounter including Diagnostic results, Documenting in the medical record, Reviewing / ordering tests, medicine, procedures  , Obtaining or reviewing history  , and Communicating with other healthcare professionals .    ** Please Note: This note has been constructed using a voice recognition system. **

## 2024-11-11 NOTE — ASSESSMENT & PLAN NOTE
History of cystic fibrosis s/p 2 previous lung transplant with chronic rejection on tacrolimus/prednisone and follows with [facility]  Continue chronic Mepron/Zithromax/Valtrex  Please note ED provider discussed case with [facility] transplant team on-call and they recommended patient can be admitted overnight observation status for angioedema.  No requirement for transfer.

## 2024-11-11 NOTE — ASSESSMENT & PLAN NOTE
Lab Results   Component Value Date    HGBA1C 7.5 (H) 09/21/2024       Recent Labs     11/10/24  1938   POCGLU 96       Blood Sugar Average: Last 72 hrs:  (P) 96    Lantus 12 units nightly ordered  Sliding scale coverage with ACHS checks  Consistent carb diet  Hypoglycemia protocol.

## 2024-11-11 NOTE — PROGRESS NOTES
"Progress Note - Hospitalist   Name: Maximilian Mann 48 y.o. female I MRN: [id number]  Unit/Bed#: -01 I Date of Admission: 11/10/2024   Date of Service: 11/11/2024 I Hospital Day: 0    Assessment & Plan  Angioedema  History of recurrent idiopathic angioedema presented to ED with sudden onset facial/neck swelling when she woke up 11/10/24.    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 Patient reports she is \"end-stage\" at this point, and not a candidate for another transplant  Continue chronic Mepron/Zithromax/Valtrex  11/10 ED provider discussed case with [facility] transplant team on-call who did not feel transfer necessary at time of presentation  Hx of lung transplant ([facility])  History of chronic rejection on tacrolimus/prednisone.  History of pulmonary embolus (PE)  Continue Eliquis.  Type 1 diabetes mellitus with diabetic chronic kidney disease, unspecified CKD stage ([facility])  Blood Sugar Average: Last 72 hrs:(P) 202  Lantus 12 units nightly ordered  Sliding scale coverage with ACHS checks  Consistent carb diet  Hypoglycemia protocol  Chronic pain disorder  Maintained chronically on oral oxycodone 10 mg every 4 hours as needed.  GERD (gastroesophageal reflux disease)  Continue Pepcid, add pantoprazole and carafate due to esophagitis on imaging. Can trial bentyl as well  Is due to EGD as well as Bravo pH test    VTE Pharmacologic Prophylaxis: VTE Score: 1 Low Risk (Score 0-2) - Encourage " Ambulation.    Mobility:   Basic Mobility Inpatient Raw Score: 24  JH-HLM Goal: 8: Walk 250 feet or more  JH-HLM Achieved: 1: Laying in bed  JH-HLM Goal NOT achieved. Continue with multidisciplinary rounding and encourage appropriate mobility to improve upon JH-HLM goals.    Patient Centered Rounds: I performed bedside rounds with nursing staff today.   Discussions with Specialists or Other Care Team Provider: WESLY; will also review chart and possibly reach out to her [facility] team again to update them     Education and Discussions with Family / Patient: Patient declined call to .     Current Length of Stay: 0 day(s)  Current Patient Status: Observation   Certification Statement: The patient, admitted on an observation basis, will now require > 2 midnight hospital stay due to couch, CF, abdominal pain  Discharge Plan: tbd    Code Status: Level 1 - Full Code    Subjective   Patient tearful this am, reports epigastric pain and substernal heaviness, relieve with dilaudid. States her pulmonary issues are end-stage at this point, and she is really just looking to stay comfortable. No nausea or vomiting. Swelling of the face/neck has resolved. No fevers or chills overnight. States that her EGD/Bravo procedure were delayed due to recent hospitalization.     Objective :  Temp:  [97.9 °F (36.6 °C)-99.2 °F (37.3 °C)] 98.2 °F (36.8 °C)  HR:  [64-83] 64  BP: ()/(51-81) 102/59  Resp:  [13-22] 16  SpO2:  [97 %-100 %] 98 %  O2 Device: Nasal cannula  Nasal Cannula O2 Flow Rate (L/min):  [2 L/min-3 L/min] 2 L/min    Body mass index is 21.03 kg/m².     Input and Output Summary (last 24 hours):     Intake/Output Summary (Last 24 hours) at 11/11/2024 0954  Last data filed at 11/11/2024 0001  Gross per 24 hour   Intake 1280 ml   Output --   Net 1280 ml       Physical Exam  Vitals and nursing note reviewed.   Constitutional:       General: She is awake. She is in acute distress.      Appearance: Normal appearance. She is  well-developed and well-groomed. She is ill-appearing. She is not toxic-appearing.   Cardiovascular:      Rate and Rhythm: Normal rate.   Pulmonary:      Effort: Pulmonary effort is normal. No respiratory distress.      Comments: Currently SpO2: 98 % on Nasal Cannula O2 Flow Rate (L/min): 2 L/min   Skin:     Coloration: Skin is not pale.   Neurological:      Mental Status: She is alert and oriented to person, place, and time.   Psychiatric:         Mood and Affect: Mood normal. Affect is tearful.         Behavior: Behavior normal. Behavior is cooperative.            Lines/Drains:  Lines/Drains/Airways       Active Status       Name Placement date Placement time Site Days    Port A Cath 11/25/22 Right Chest 11/25/22  0953  Chest  717                    Central Line:  Goal for removal:  Port                Lab Results: I have reviewed the following results:   Results from last 7 days   Lab Units 11/11/24  0858 11/10/24  1411   WBC Thousand/uL 7.18 6.87   HEMOGLOBIN g/dL 8.7* 9.3*   HEMATOCRIT % 29.8* 31.9*   PLATELETS Thousands/uL 132* 138*   SEGS PCT %  --  64   LYMPHO PCT %  --  25   MONO PCT %  --  10   EOS PCT %  --  1     Results from last 7 days   Lab Units 11/11/24  0858   SODIUM mmol/L 133*   POTASSIUM mmol/L 5.1   CHLORIDE mmol/L 98   CO2 mmol/L 29   BUN mg/dL 20   CREATININE mg/dL 1.05   ANION GAP mmol/L 6   CALCIUM mg/dL 7.9*   ALBUMIN g/dL 2.8*   TOTAL BILIRUBIN mg/dL 0.33   ALK PHOS U/L 68   ALT U/L 23   AST U/L 18   GLUCOSE RANDOM mg/dL 257*         Results from last 7 days   Lab Units 11/11/24  0606 11/10/24  2122 11/10/24  1938   POC GLUCOSE mg/dl 348* 162* 96               Recent Cultures (last 7 days):         Imaging Results Review: I reviewed radiology reports from this admission including: chest xray and CT chest.  Other Study Results Review: No additional pertinent studies reviewed.    Last 24 Hours Medication List:     Current Facility-Administered Medications:     acetaminophen (TYLENOL) tablet  650 mg, Q6H PRN    acetaminophen (TYLENOL) tablet 975 mg, Q8H DOTTY    albuterol inhalation solution 5 mg, Q4H PRN    ALPRAZolam (XANAX) tablet 1 mg, HS PRN    apixaban (ELIQUIS) tablet 5 mg, BID    atovaquone (MEPRON) oral suspension 1,500 mg, Daily    azithromycin (ZITHROMAX) tablet 250 mg, Once per day on Monday Wednesday Friday    budesonide-formoterol (SYMBICORT) 160-4.5 mcg/act inhaler 2 puff, Q12H    dicyclomine (BENTYL) capsule 20 mg, 4x Daily (AC & HS)    diphenhydrAMINE (BENADRYL) injection 25 mg, Q6H PRN    famotidine (PEPCID) tablet 40 mg, HS    insulin glargine (LANTUS) subcutaneous injection 12 Units 0.12 mL, QAM    insulin lispro (HumALOG/ADMELOG) 100 units/mL subcutaneous injection 1-5 Units, TID AC **AND** Fingerstick Glucose (POCT), TID AC    lidocaine (LIDODERM) 5 % patch 1 patch, Daily    oxyCODONE (ROXICODONE) immediate release tablet 10 mg, Q4H PRN    pancrelipase (Lip-Prot-Amyl) (CREON) delayed release capsule 48,000 Units, TID With Meals    pantoprazole (PROTONIX) EC tablet 40 mg, Early Morning    predniSONE tablet 15 mg, Daily    pregabalin (LYRICA) capsule 150 mg, BID    sucralfate (CARAFATE) tablet 1 g, TID AC    tacrolimus (PROGRAF) capsule 2 mg, BID    valACYclovir (VALTREX) tablet 500 mg, Daily    Administrative Statements   Today, Patient Was Seen By: Yolanda Ferrara PA-C  I have spent a total time of 45 minutes in caring for this patient on the day of the visit/encounter including Impressions, Counseling / Coordination of care, Documenting in the medical record, Reviewing / ordering tests, medicine, procedures  , Obtaining or reviewing history  , and Communicating with other healthcare professionals .    **Please Note: This note may have been constructed using a voice recognition system.**

## 2024-11-11 NOTE — QUICK NOTE
Notified by RN patient c/o pain.  Upon questioning patient reports substernal chest pressure for past 2 days.  She states the pain comes and goes and denies radiation to arm or neck.  Patient reports she was recently seen as outpatient for same chest pain that she is experiencing now.  She states the pain is exacerbated with coughing.  Denies any increased shortness of breath.  Additionally she is complaining of diarrhea x 2 days.  She denies blood in stool and states she has never had diarrhea like this in the past.  She does report recent antibiotic use.  States she was on 7 days of minocycline for CF in addition to the chronic antibiotics that she takes for CF maintenance.    On exam patient is resting comfortably in bed in no acute distress.  VSS.  There is reproducible substernal chest pain upon palpation. Abdomen nontender nondistended, bowel sounds in all 4 quadrants.    Lidocaine patch ordered for chest pain.  Additionally patient reports Dilaudid helped with pain in ER, one-time dose of Dilaudid ordered.  Stool studies ordered.

## 2024-11-11 NOTE — ASSESSMENT & PLAN NOTE
Continue Pepcid, add pantoprazole and carafate due to esophagitis on imaging. Can trial bentyl as well  Is due to EGD as well as Bravo pH test

## 2024-11-11 NOTE — ASSESSMENT & PLAN NOTE
History of cystic fibrosis s/p 2 previous lung transplant with chronic rejection on tacrolimus/prednisone and follows with [facility]  Continue chronic Mepron/Zithromax/Valtrex  Please note ED provider discussed case with [facility] transplant team on-call and they recommended patient can be admitted overnight observation status for angioedema.  No requirement for transfer.

## 2024-11-11 NOTE — ASSESSMENT & PLAN NOTE
Blood Sugar Average: Last 72 hrs:(P) 202  Lantus 12 units nightly ordered  Sliding scale coverage with ACHS checks  Consistent carb diet  Hypoglycemia protocol

## 2024-11-11 NOTE — ASSESSMENT & PLAN NOTE
H/o cystic fibrosis s/p lung transplant with chronic rejection on prednisone/tacrolimus, history of recurrent idiopathic angioedema presenting to the ED reporting sudden onset facial/neck swelling when she woke up this morning.  She did report some sore throat but no worsening difficulty swallowing.  She also has chronic difficulty breathing associated with her history of cystic fibrosis but no worsening than baseline.    CT soft tissue neck: neg for acute findings  CT chest wo contrast : No acute finding  In the ED, Pepcid/Dilaudid/IV fluid bolus.  Upon my examination patient reports feeling improved already  ED provider discussed with patient's transplant team at [facility] and they recommended patient be admitted here for overnight observation  IV Solu-Medrol 40 mg x 1 dose ordered.  Continue home oral prednisone 15 mg daily starting tomorrow  As needed Benadryl ordered  Overnight pulse ox ordered.

## 2024-11-11 NOTE — TREATMENT PLAN
LMOM for [facility] transplant team to provide update. GI consulted due to ongoing upper GI pain

## 2024-11-11 NOTE — PLAN OF CARE
Problem: Potential for Falls  Goal: Patient will remain free of falls  Description: INTERVENTIONS:  - Educate patient/family on patient safety including physical limitations  - Instruct patient to call for assistance with activity   - Consult OT/PT to assist with strengthening/mobility   - Keep Call bell within reach  - Keep bed low and locked with side rails adjusted as appropriate  - Keep care items and personal belongings within reach  - Initiate and maintain comfort rounds  - Make Fall Risk Sign visible to staff  - Offer Toileting every 2 Hours, in advance of need  - Initiate/Maintain bed alarm  - Obtain necessary fall risk management equipment: walker  - Apply yellow socks and bracelet for high fall risk patients  - Consider moving patient to room near nurses station  Outcome: Progressing     Problem: PAIN - ADULT  Goal: Verbalizes/displays adequate comfort level or baseline comfort level  Description: Interventions:  - Encourage patient to monitor pain and request assistance  - Assess pain using appropriate pain scale  - Administer analgesics based on type and severity of pain and evaluate response  - Implement non-pharmacological measures as appropriate and evaluate response  - Consider cultural and social influences on pain and pain management  - Notify physician/advanced practitioner if interventions unsuccessful or patient reports new pain  Outcome: Progressing     Problem: INFECTION - ADULT  Goal: Absence or prevention of progression during hospitalization  Description: INTERVENTIONS:  - Assess and monitor for signs and symptoms of infection  - Monitor lab/diagnostic results  - Monitor all insertion sites, i.e. indwelling lines, tubes, and drains  - Monitor endotracheal if appropriate and nasal secretions for changes in amount and color  - [employer] appropriate cooling/warming therapies per order  - Administer medications as ordered  - Instruct and encourage patient and family to use good hand hygiene  technique  - Identify and instruct in appropriate isolation precautions for identified infection/condition  Outcome: Progressing  Goal: Absence of fever/infection during neutropenic period  Description: INTERVENTIONS:  - Monitor WBC    Outcome: Progressing     Problem: SAFETY ADULT  Goal: Patient will remain free of falls  Description: INTERVENTIONS:  - Educate patient/family on patient safety including physical limitations  - Instruct patient to call for assistance with activity   - Consult OT/PT to assist with strengthening/mobility   - Keep Call bell within reach  - Keep bed low and locked with side rails adjusted as appropriate  - Keep care items and personal belongings within reach  - Initiate and maintain comfort rounds  - Make Fall Risk Sign visible to staff  - Offer Toileting every 2 Hours, in advance of need  - Initiate/Maintain bed alarm  - Obtain necessary fall risk management equipment: walker  - Apply yellow socks and bracelet for high fall risk patients  - Consider moving patient to room near nurses station  Outcome: Progressing  Goal: Maintain or return to baseline ADL function  Description: INTERVENTIONS:  -  Assess patient's ability to carry out ADLs; assess patient's baseline for ADL function and identify physical deficits which impact ability to perform ADLs (bathing, care of mouth/teeth, toileting, grooming, dressing, etc.)  - Assess/evaluate cause of self-care deficits   - Assess range of motion  - Assess patient's mobility; develop plan if impaired  - Assess patient's need for assistive devices and provide as appropriate  - Encourage maximum independence but intervene and supervise when necessary  - Involve family in performance of ADLs  - Assess for home care needs following discharge   - Consider OT consult to assist with ADL evaluation and planning for discharge  - Provide patient education as appropriate  Outcome: Progressing  Goal: Maintains/Returns to pre admission functional  level  Description: INTERVENTIONS:  - Perform AM-PAC 6 Click Basic Mobility/ Daily Activity assessment daily.  - Set and communicate daily mobility goal to care team and patient/family/caregiver.   - Collaborate with rehabilitation services on mobility goals if consulted  - Perform Range of Motion 2 times a day.  - Reposition patient every 2 hours.  - Dangle patient 2 times a day  - Stand patient 2 times a day  - Ambulate patient 2 times a day  - Out of bed to chair 2 times a day   - Out of bed for meals 3 times a day  - Out of bed for toileting  - Record patient progress and toleration of activity level   Outcome: Progressing

## 2024-11-12 LAB
ALBUMIN SERPL BCG-MCNC: 2.9 G/DL (ref 3.5–5)
ALP SERPL-CCNC: 63 U/L (ref 34–104)
ALT SERPL W P-5'-P-CCNC: 19 U/L (ref 7–52)
ANION GAP SERPL CALCULATED.3IONS-SCNC: 4 MMOL/L (ref 4–13)
AST SERPL W P-5'-P-CCNC: 14 U/L (ref 13–39)
BASOPHILS # BLD AUTO: 0.01 THOUSANDS/ÂΜL (ref 0–0.1)
BASOPHILS NFR BLD AUTO: 0 % (ref 0–1)
BILIRUB SERPL-MCNC: 0.27 MG/DL (ref 0.2–1)
BUN SERPL-MCNC: 21 MG/DL (ref 5–25)
CALCIUM ALBUM COR SERPL-MCNC: 8.9 MG/DL (ref 8.3–10.1)
CALCIUM SERPL-MCNC: 8 MG/DL (ref 8.4–10.2)
CHLORIDE SERPL-SCNC: 101 MMOL/L (ref 96–108)
CO2 SERPL-SCNC: 29 MMOL/L (ref 21–32)
CREAT SERPL-MCNC: 0.93 MG/DL (ref 0.6–1.3)
EOSINOPHIL # BLD AUTO: 0.06 THOUSAND/ÂΜL (ref 0–0.61)
EOSINOPHIL NFR BLD AUTO: 1 % (ref 0–6)
ERYTHROCYTE [DISTWIDTH] IN BLOOD BY AUTOMATED COUNT: 17.6 % (ref 11.6–15.1)
GFR SERPL CREATININE-BSD FRML MDRD: 73 ML/MIN/1.73SQ M
GLUCOSE SERPL-MCNC: 107 MG/DL (ref 65–140)
GLUCOSE SERPL-MCNC: 275 MG/DL (ref 65–140)
GLUCOSE SERPL-MCNC: 281 MG/DL (ref 65–140)
GLUCOSE SERPL-MCNC: 49 MG/DL (ref 65–140)
GLUCOSE SERPL-MCNC: 76 MG/DL (ref 65–140)
HCT VFR BLD AUTO: 29.4 % (ref 34.8–46.1)
HGB BLD-MCNC: 8.4 G/DL (ref 11.5–15.4)
IMM GRANULOCYTES # BLD AUTO: 0.02 THOUSAND/UL (ref 0–0.2)
IMM GRANULOCYTES NFR BLD AUTO: 0 % (ref 0–2)
LYMPHOCYTES # BLD AUTO: 1.29 THOUSANDS/ÂΜL (ref 0.6–4.47)
LYMPHOCYTES NFR BLD AUTO: 24 % (ref 14–44)
MCH RBC QN AUTO: 27.4 PG (ref 26.8–34.3)
MCHC RBC AUTO-ENTMCNC: 28.6 G/DL (ref 31.4–37.4)
MCV RBC AUTO: 96 FL (ref 82–98)
MONOCYTES # BLD AUTO: 0.65 THOUSAND/ÂΜL (ref 0.17–1.22)
MONOCYTES NFR BLD AUTO: 12 % (ref 4–12)
NEUTROPHILS # BLD AUTO: 3.28 THOUSANDS/ÂΜL (ref 1.85–7.62)
NEUTS SEG NFR BLD AUTO: 63 % (ref 43–75)
NRBC BLD AUTO-RTO: 0 /100 WBCS
PLATELET # BLD AUTO: 112 THOUSANDS/UL (ref 149–390)
PMV BLD AUTO: 10.2 FL (ref 8.9–12.7)
POTASSIUM SERPL-SCNC: 5.1 MMOL/L (ref 3.5–5.3)
PROCALCITONIN SERPL-MCNC: 9.85 NG/ML
PROT SERPL-MCNC: 5.3 G/DL (ref 6.4–8.4)
RBC # BLD AUTO: 3.07 MILLION/UL (ref 3.81–5.12)
SODIUM SERPL-SCNC: 134 MMOL/L (ref 135–147)
WBC # BLD AUTO: 5.31 THOUSAND/UL (ref 4.31–10.16)

## 2024-11-12 PROCEDURE — 85025 COMPLETE CBC W/AUTO DIFF WBC: CPT | Performed by: PHYSICIAN ASSISTANT

## 2024-11-12 PROCEDURE — 94668 MNPJ CHEST WALL SBSQ: CPT

## 2024-11-12 PROCEDURE — 84145 PROCALCITONIN (PCT): CPT | Performed by: PHYSICIAN ASSISTANT

## 2024-11-12 PROCEDURE — 82948 REAGENT STRIP/BLOOD GLUCOSE: CPT

## 2024-11-12 PROCEDURE — 99232 SBSQ HOSP IP/OBS MODERATE 35: CPT | Performed by: PHYSICIAN ASSISTANT

## 2024-11-12 PROCEDURE — 94664 DEMO&/EVAL PT USE INHALER: CPT

## 2024-11-12 PROCEDURE — 99254 IP/OBS CNSLTJ NEW/EST MOD 60: CPT | Performed by: INTERNAL MEDICINE

## 2024-11-12 PROCEDURE — 80053 COMPREHEN METABOLIC PANEL: CPT | Performed by: PHYSICIAN ASSISTANT

## 2024-11-12 PROCEDURE — 94760 N-INVAS EAR/PLS OXIMETRY 1: CPT

## 2024-11-12 RX ORDER — METOCLOPRAMIDE HYDROCHLORIDE 5 MG/ML
5 INJECTION INTRAMUSCULAR; INTRAVENOUS EVERY 6 HOURS SCHEDULED
Status: DISCONTINUED | OUTPATIENT
Start: 2024-11-12 | End: 2024-11-18 | Stop reason: HOSPADM

## 2024-11-12 RX ORDER — CALCIUM CARBONATE 500 MG/1
500 TABLET, CHEWABLE ORAL DAILY PRN
Status: DISCONTINUED | OUTPATIENT
Start: 2024-11-12 | End: 2024-11-18 | Stop reason: HOSPADM

## 2024-11-12 RX ORDER — OXYCODONE HYDROCHLORIDE 10 MG/1
20 TABLET ORAL EVERY 4 HOURS PRN
Status: DISCONTINUED | OUTPATIENT
Start: 2024-11-12 | End: 2024-11-18 | Stop reason: HOSPADM

## 2024-11-12 RX ORDER — CEFAZOLIN SODIUM 2 G/50ML
2000 SOLUTION INTRAVENOUS EVERY 8 HOURS
Status: DISCONTINUED | OUTPATIENT
Start: 2024-11-12 | End: 2024-11-15

## 2024-11-12 RX ORDER — POLYETHYLENE GLYCOL 3350 17 G/17G
17 POWDER, FOR SOLUTION ORAL DAILY PRN
Status: DISCONTINUED | OUTPATIENT
Start: 2024-11-12 | End: 2024-11-18 | Stop reason: HOSPADM

## 2024-11-12 RX ORDER — CLINDAMYCIN PHOSPHATE 600 MG/50ML
600 INJECTION, SOLUTION INTRAVENOUS EVERY 8 HOURS
Status: DISCONTINUED | OUTPATIENT
Start: 2024-11-12 | End: 2024-11-12

## 2024-11-12 RX ADMIN — OXYCODONE HYDROCHLORIDE 10 MG: 10 TABLET ORAL at 02:54

## 2024-11-12 RX ADMIN — APIXABAN 5 MG: 5 TABLET, FILM COATED ORAL at 17:05

## 2024-11-12 RX ADMIN — PREDNISONE 15 MG: 5 TABLET ORAL at 10:05

## 2024-11-12 RX ADMIN — DICYCLOMINE HYDROCHLORIDE 20 MG: 10 CAPSULE ORAL at 15:23

## 2024-11-12 RX ADMIN — ACETAMINOPHEN 975 MG: 325 TABLET, FILM COATED ORAL at 15:23

## 2024-11-12 RX ADMIN — CALCIUM CARBONATE (ANTACID) CHEW TAB 500 MG 500 MG: 500 CHEW TAB at 03:17

## 2024-11-12 RX ADMIN — DIPHENHYDRAMINE HYDROCHLORIDE 25 MG: 50 INJECTION, SOLUTION INTRAMUSCULAR; INTRAVENOUS at 21:29

## 2024-11-12 RX ADMIN — PANCRELIPASE 24000 UNITS: 120000; 24000; 76000 CAPSULE, DELAYED RELEASE PELLETS ORAL at 19:53

## 2024-11-12 RX ADMIN — BUDESONIDE AND FORMOTEROL FUMARATE DIHYDRATE 2 PUFF: 160; 4.5 AEROSOL RESPIRATORY (INHALATION) at 19:33

## 2024-11-12 RX ADMIN — VALACYCLOVIR HYDROCHLORIDE 500 MG: 500 TABLET, FILM COATED ORAL at 10:05

## 2024-11-12 RX ADMIN — ALPRAZOLAM 1 MG: 0.5 TABLET ORAL at 03:36

## 2024-11-12 RX ADMIN — HYDROMORPHONE HYDROCHLORIDE 0.5 MG: 1 INJECTION, SOLUTION INTRAMUSCULAR; INTRAVENOUS; SUBCUTANEOUS at 04:10

## 2024-11-12 RX ADMIN — PREGABALIN 150 MG: 75 CAPSULE ORAL at 17:05

## 2024-11-12 RX ADMIN — METOCLOPRAMIDE 5 MG: 5 INJECTION, SOLUTION INTRAMUSCULAR; INTRAVENOUS at 17:03

## 2024-11-12 RX ADMIN — PANCRELIPASE 48000 UNITS: 120000; 24000; 76000 CAPSULE, DELAYED RELEASE PELLETS ORAL at 07:19

## 2024-11-12 RX ADMIN — PANCRELIPASE 24000 UNITS: 120000; 24000; 76000 CAPSULE, DELAYED RELEASE PELLETS ORAL at 05:37

## 2024-11-12 RX ADMIN — DICYCLOMINE HYDROCHLORIDE 20 MG: 10 CAPSULE ORAL at 10:51

## 2024-11-12 RX ADMIN — OXYCODONE HYDROCHLORIDE 15 MG: 10 TABLET ORAL at 11:52

## 2024-11-12 RX ADMIN — BUDESONIDE AND FORMOTEROL FUMARATE DIHYDRATE 2 PUFF: 160; 4.5 AEROSOL RESPIRATORY (INHALATION) at 10:07

## 2024-11-12 RX ADMIN — ATOVAQUONE 1500 MG: 750 SUSPENSION ORAL at 10:07

## 2024-11-12 RX ADMIN — OXYCODONE HYDROCHLORIDE 15 MG: 10 TABLET ORAL at 17:18

## 2024-11-12 RX ADMIN — TACROLIMUS 2 MG: 0.5 CAPSULE ORAL at 10:05

## 2024-11-12 RX ADMIN — HYDROMORPHONE HYDROCHLORIDE 0.5 MG: 1 INJECTION, SOLUTION INTRAMUSCULAR; INTRAVENOUS; SUBCUTANEOUS at 15:19

## 2024-11-12 RX ADMIN — TACROLIMUS 2 MG: 0.5 CAPSULE ORAL at 17:05

## 2024-11-12 RX ADMIN — SUCRALFATE 1 G: 1 TABLET ORAL at 10:53

## 2024-11-12 RX ADMIN — HYDROMORPHONE HYDROCHLORIDE 0.5 MG: 1 INJECTION, SOLUTION INTRAMUSCULAR; INTRAVENOUS; SUBCUTANEOUS at 19:33

## 2024-11-12 RX ADMIN — PREGABALIN 150 MG: 75 CAPSULE ORAL at 10:06

## 2024-11-12 RX ADMIN — HYDROMORPHONE HYDROCHLORIDE 0.5 MG: 1 INJECTION, SOLUTION INTRAMUSCULAR; INTRAVENOUS; SUBCUTANEOUS at 23:56

## 2024-11-12 RX ADMIN — SUCRALFATE 1 G: 1 TABLET ORAL at 15:22

## 2024-11-12 RX ADMIN — HYDROMORPHONE HYDROCHLORIDE 0.5 MG: 1 INJECTION, SOLUTION INTRAMUSCULAR; INTRAVENOUS; SUBCUTANEOUS at 10:04

## 2024-11-12 RX ADMIN — FAMOTIDINE 40 MG: 20 TABLET, FILM COATED ORAL at 21:30

## 2024-11-12 RX ADMIN — PANTOPRAZOLE SODIUM 40 MG: 40 TABLET, DELAYED RELEASE ORAL at 15:22

## 2024-11-12 RX ADMIN — APIXABAN 5 MG: 5 TABLET, FILM COATED ORAL at 10:04

## 2024-11-12 RX ADMIN — DOXYCYCLINE 100 MG: 100 INJECTION, POWDER, LYOPHILIZED, FOR SOLUTION INTRAVENOUS at 10:47

## 2024-11-12 RX ADMIN — CEFAZOLIN SODIUM 2000 MG: 2 SOLUTION INTRAVENOUS at 21:30

## 2024-11-12 RX ADMIN — METOCLOPRAMIDE 5 MG: 5 INJECTION, SOLUTION INTRAMUSCULAR; INTRAVENOUS at 11:39

## 2024-11-12 RX ADMIN — INSULIN LISPRO 10 UNITS: 100 INJECTION, SOLUTION INTRAVENOUS; SUBCUTANEOUS at 07:29

## 2024-11-12 RX ADMIN — OXYCODONE HYDROCHLORIDE 20 MG: 10 TABLET ORAL at 21:30

## 2024-11-12 RX ADMIN — INSULIN LISPRO 4 UNITS: 100 INJECTION, SOLUTION INTRAVENOUS; SUBCUTANEOUS at 07:29

## 2024-11-12 RX ADMIN — INSULIN GLARGINE 12 UNITS: 100 INJECTION, SOLUTION SUBCUTANEOUS at 10:08

## 2024-11-12 RX ADMIN — PANCRELIPASE 48000 UNITS: 120000; 24000; 76000 CAPSULE, DELAYED RELEASE PELLETS ORAL at 16:37

## 2024-11-12 RX ADMIN — INSULIN LISPRO 8 UNITS: 100 INJECTION, SOLUTION INTRAVENOUS; SUBCUTANEOUS at 11:44

## 2024-11-12 RX ADMIN — OXYCODONE HYDROCHLORIDE 10 MG: 10 TABLET ORAL at 07:18

## 2024-11-12 RX ADMIN — CEFAZOLIN SODIUM 2000 MG: 2 SOLUTION INTRAVENOUS at 15:25

## 2024-11-12 RX ADMIN — SUCRALFATE 1 G: 1 TABLET ORAL at 04:08

## 2024-11-12 RX ADMIN — PANTOPRAZOLE SODIUM 40 MG: 40 TABLET, DELAYED RELEASE ORAL at 04:08

## 2024-11-12 NOTE — ASSESSMENT & PLAN NOTE
"History of cystic fibrosis s/p 2 previous lung transplant with chronic rejection on tacrolimus/prednisone. Patient reports she is \"end-stage\" at this point, and not a candidate for another transplant  Continue chronic Mepron/Zithromax/Valtrex  11/11 spoke to Frances from [facility] transplant team  Doxycycline initiated 11/11 as well, concern for bacterial infection in setting of CF  Procal 9.85, afebrile without leukocytosis   "

## 2024-11-12 NOTE — ASSESSMENT & PLAN NOTE
History of recurrent idiopathic angioedema presented to ED with sudden onset facial/neck swelling when she woke up 11/10/24.    CT soft tissue neck no acute findings  CT chest no acute findings  CBC, CMP wnl for patient, hsTnI normal  s/p Pepcid/Dilaudid/IV fluid bolus + IV solumedrol x1 in ER; symptoms resolved at present time   ED provider discussed with patient's transplant team at [facility] and they recommended patient be admitted here for overnight observation  Continue oral prednisone 15 mg daily as per home dose   This issue appears resolved at present time

## 2024-11-12 NOTE — PROGRESS NOTES
"Progress Note - Hospitalist   Name: Maximilian Mann 48 y.o. female I MRN: [id number]  Unit/Bed#: -01 I Date of Admission: 11/10/2024   Date of Service: 11/12/2024 I Hospital Day: 0    Assessment & Plan  Angioedema  History of recurrent idiopathic angioedema presented to ED with sudden onset facial/neck swelling when she woke up 11/10/24.    CT soft tissue neck no acute findings  CT chest no acute findings  CBC, CMP wnl for patient, hsTnI normal  s/p Pepcid/Dilaudid/IV fluid bolus + IV solumedrol x1 in ER; symptoms resolved at present time   ED provider discussed with patient's transplant team at [facility] and they recommended patient be admitted here for overnight observation  Continue oral prednisone 15 mg daily as per home dose   This issue appears resolved at present time  Cystic fibrosis ([facility])  History of cystic fibrosis s/p 2 previous lung transplant with chronic rejection on tacrolimus/prednisone. Patient reports she is \"end-stage\" at this point, and not a candidate for another transplant  Continue chronic Mepron/Zithromax/Valtrex  11/11 spoke to Frances from [facility] transplant team  Doxycycline initiated 11/11 as well, concern for bacterial infection in setting of CF  Procal 9.85, afebrile without leukocytosis   Hx of lung transplant ([facility])  History of chronic rejection on tacrolimus/prednisone.  History of pulmonary embolus (PE)  Continue Eliquis.  Type 1 diabetes mellitus with diabetic chronic kidney disease, unspecified CKD stage ([facility])  Blood Sugar Average: Last 72 hrs:(P) 187.2360572194032509  Lantus 12 units nightly ordered  Sliding scale coverage with ACHS checks  Consistent carb diet  Hypoglycemia protocol  Chronic pain disorder  Maintained chronically on oral oxycodone 10 mg every 4 hours as needed.  GERD (gastroesophageal reflux disease)  Continue Pepcid, add pantoprazole and carafate due to esophagitis on imaging. Can trial bentyl as well  Is due to EGD as well as Bravo pH test    VTE " "Pharmacologic Prophylaxis: VTE Score: 1 Low Risk (Score 0-2) - Encourage Ambulation.    Mobility:   Basic Mobility Inpatient Raw Score: 23  JH-HLM Goal: 7: Walk 25 feet or more  JH-HLM Achieved: 7: Walk 25 feet or more  JH-HLM Goal achieved. Continue to encourage appropriate mobility.    Patient Centered Rounds: I performed bedside rounds with nursing staff today.   Discussions with Specialists or Other Care Team Provider: WESLY, will update [facility] this afternoon    Education and Discussions with Family / Patient: patient     Current Length of Stay: 0 day(s)  Current Patient Status: Observation   Certification Statement: The patient, admitted on an observation basis, will now require > 2 midnight hospital stay due to CF with superimposed infection, GI pain   Discharge Plan: Anticipate discharge in 48-72 hrs to home.    Code Status: Level 1 - Full Code    Subjective   Patient continues to have pain and cough. No fevers or chills. Upset regarding not receiving IV dilaudid when \"due\". Discussed increasing oral oxycodone to decrease need for the IV dilaudid as much, she is agreeable. Advised patient that I was able to speak to her transplant provider yesterday and will update again today per patient request.     Objective :  Temp:  [98.2 °F (36.8 °C)-98.7 °F (37.1 °C)] 98.2 °F (36.8 °C)  HR:  [70-79] 71  BP: (111-150)/(66-83) 122/79  Resp:  [16-18] 18  SpO2:  [94 %-95 %] 94 %  O2 Device: None (Room air)    Body mass index is 21.03 kg/m².     Input and Output Summary (last 24 hours):     Intake/Output Summary (Last 24 hours) at 11/12/2024 0851  Last data filed at 11/12/2024 0155  Gross per 24 hour   Intake 642 ml   Output 0 ml   Net 642 ml       Physical Exam  Vitals and nursing note reviewed.   Constitutional:       General: She is awake. She is not in acute distress.     Appearance: Normal appearance. She is well-developed and well-groomed. She is ill-appearing. She is not toxic-appearing.   Cardiovascular:      " Rate and Rhythm: Normal rate.   Pulmonary:      Effort: Pulmonary effort is normal. No respiratory distress.      Breath sounds: Rhonchi present.   Musculoskeletal:      Right lower leg: No edema.      Left lower leg: No edema.   Skin:     Coloration: Skin is not pale.   Neurological:      Mental Status: She is alert and oriented to person, place, and time.   Psychiatric:         Mood and Affect: Mood normal.         Behavior: Behavior normal. Behavior is cooperative.            Lines/Drains:  Lines/Drains/Airways       Active Status       Name Placement date Placement time Site Days    Port A Cath 11/25/22 Right Chest 11/25/22  0953  Chest  717                    Central Line:  Goal for removal:  PORT                Lab Results: I have reviewed the following results:   Results from last 7 days   Lab Units 11/12/24  0520   WBC Thousand/uL 5.31   HEMOGLOBIN g/dL 8.4*   HEMATOCRIT % 29.4*   PLATELETS Thousands/uL 112*   SEGS PCT % 63   LYMPHO PCT % 24   MONO PCT % 12   EOS PCT % 1     Results from last 7 days   Lab Units 11/12/24  0520   SODIUM mmol/L 134*   POTASSIUM mmol/L 5.1   CHLORIDE mmol/L 101   CO2 mmol/L 29   BUN mg/dL 21   CREATININE mg/dL 0.93   ANION GAP mmol/L 4   CALCIUM mg/dL 8.0*   ALBUMIN g/dL 2.9*   TOTAL BILIRUBIN mg/dL 0.27   ALK PHOS U/L 63   ALT U/L 19   AST U/L 14   GLUCOSE RANDOM mg/dL 281*         Results from last 7 days   Lab Units 11/12/24  0624 11/11/24  1638 11/11/24  1048 11/11/24  0606 11/10/24  2122 11/10/24  1938   POC GLUCOSE mg/dl 275* 92 150* 348* 162* 96         Results from last 7 days   Lab Units 11/12/24  0520   PROCALCITONIN ng/ml 9.85*       Recent Cultures (last 7 days):   Results from last 7 days   Lab Units 11/11/24  1205   GRAM STAIN RESULT  1+ Epithelial cells per low power field*  3+ Gram positive cocci in pairs*  No polys seen*       Imaging Results Review: No pertinent imaging studies reviewed.  Other Study Results Review: No additional pertinent studies  reviewed.    Last 24 Hours Medication List:     Current Facility-Administered Medications:     acetaminophen (TYLENOL) tablet 650 mg, Q6H PRN    acetaminophen (TYLENOL) tablet 975 mg, Q8H DOTTY    albuterol inhalation solution 5 mg, Q4H PRN    ALPRAZolam (XANAX) tablet 1 mg, HS PRN    apixaban (ELIQUIS) tablet 5 mg, BID    atovaquone (MEPRON) oral suspension 1,500 mg, Daily    azithromycin (ZITHROMAX) tablet 250 mg, Once per day on Monday Wednesday Friday    budesonide-formoterol (SYMBICORT) 160-4.5 mcg/act inhaler 2 puff, Q12H    calcium carbonate (TUMS) chewable tablet 500 mg, Daily PRN    dicyclomine (BENTYL) capsule 20 mg, 4x Daily (AC & HS)    diphenhydrAMINE (BENADRYL) injection 25 mg, Q6H PRN    doxycycline (VIBRAMYCIN) 100 mg in sodium chloride 0.9 % 100 mL IVPB, Q12H, Last Rate: 100 mg (11/11/24 2202)    famotidine (PEPCID) tablet 40 mg, HS    HYDROmorphone (DILAUDID) injection 0.5 mg, Q4H PRN    insulin glargine (LANTUS) subcutaneous injection 12 Units 0.12 mL, QAM    insulin lispro (HumALOG/ADMELOG) 100 units/mL subcutaneous injection 10 Units, Daily With Breakfast    insulin lispro (HumALOG/ADMELOG) 100 units/mL subcutaneous injection 12 Units, Daily With Dinner    insulin lispro (HumALOG/ADMELOG) 100 units/mL subcutaneous injection 2-7 Units, TID AC **AND** Fingerstick Glucose (POCT), TID AC    insulin lispro (HumALOG/ADMELOG) 100 units/mL subcutaneous injection 8 Units, Daily With Lunch    lidocaine (LIDODERM) 5 % patch 1 patch, Daily    oxyCODONE (ROXICODONE) immediate release tablet 10 mg, Q4H PRN    pancrelipase (Lip-Prot-Amyl) (CREON) delayed release capsule 24,000 Units, TID PRN    pancrelipase (Lip-Prot-Amyl) (CREON) delayed release capsule 48,000 Units, TID With Meals    pantoprazole (PROTONIX) EC tablet 40 mg, BID AC    predniSONE tablet 15 mg, Daily    pregabalin (LYRICA) capsule 150 mg, BID    sucralfate (CARAFATE) tablet 1 g, TID AC    tacrolimus (PROGRAF) capsule 2 mg, BID    valACYclovir  (VALTREX) tablet 500 mg, Daily    Administrative Statements   Today, Patient Was Seen By: Yolanda Ferrara PA-C  I have spent a total time of 55 minutes in caring for this patient on the day of the visit/encounter including Impressions, Counseling / Coordination of care, Documenting in the medical record, Reviewing / ordering tests, medicine, procedures  , and Communicating with other healthcare professionals .    **Please Note: This note may have been constructed using a voice recognition system.**

## 2024-11-12 NOTE — ASSESSMENT & PLAN NOTE
Blood Sugar Average: Last 72 hrs:(P) [id number]  Lantus 12 units nightly ordered  Sliding scale coverage with ACHS checks  Consistent carb diet  Hypoglycemia protocol

## 2024-11-12 NOTE — RESPIRATORY THERAPY NOTE
RT Protocol Note  Maximilian Mann 48 y.o. female MRN: [id number]  Unit/Bed#: -01 Encounter: [id number]    Assessment    Principal Problem:    Angioedema  Active Problems:    Type 1 diabetes mellitus with diabetic chronic kidney disease, unspecified CKD stage (HCC)    Chronic pain disorder    GERD (gastroesophageal reflux disease)    Cystic fibrosis (HCC)    Hx of lung transplant (HCC)    History of pulmonary embolus (PE)      Home Pulmonary Medications:  Inhalers/neb    Home Devices/Therapy: (P)  (flutter)    Past Medical History:   Diagnosis Date    Abnormal uterine bleeding (AUB)     ABPA (allergic bronchopulmonary aspergillosis) ([facility]) 07/08/2012    Acute kidney injury (HCC) 09/08/2017    Acute on chronic respiratory failure (HCC) 03/04/2017    Last Assessment & Plan:  Formatting of this note might be different from the original. Recurrent with clinical worsening with enlarging PTX, s/p multiple chest tubes, but new fevers 6/19 and worsening leukocytosis indicating infection as cause of 6/19 decompensation with profound hypoxia in setting of shunting through right lung and continued pneumothorax s/p 2nd chest tube placement.  6/27: Right    Anaphylaxis 06/06/2022    Anemia     Asthma     Chest pain 09/21/2021    Chronic pain     Chronic respiratory failure with hypoxia (HCC) 09/07/2021    COVID-19 06/03/2022    Cystic fibrosis ([facility])     Deep vein thrombosis (HCC)     Diabetes mellitus (HCC)     Fever 09/22/2024    GERD (gastroesophageal reflux disease)     HBP (high blood pressure)     HCAP (healthcare-associated pneumonia) 03/06/2020    History of transfusion     Influenza B 02/23/2020    Kidney stone     Moderate protein-calorie malnutrition (HCC) 01/08/2022    Neutropenic fever  (HCC) 09/26/2020    Pneumonia due to Pseudomonas species ([facility]) 10/28/2010    Last Assessment & Plan:  Formatting of this note might be different from the original. Polymicrobial pna w/ pseudomonas, M. Abscessus, Cadida  glabrata/albicans    PONV (postoperative nausea and vomiting)     Pseudomonas aeruginosa infection 2012    Renal calculus     Squamous cell skin cancer 2023    SCCIS- Left upper thigh    Transplant recipient     Lung- 2017    Ulcerative colitis (HCC)     Wellness examination 2013    Formatting of this note might be different from the original. Discussed with patient and acknowledgement form signed on 2013 (ALYSHA)     Social History     Socioeconomic History    Marital status: /Civil Union     Spouse name: None    Number of children: None    Years of education: 12    Highest education level: None   Occupational History    Occupation: Disabled    Tobacco Use    Smoking status: Former     Current packs/day: 0.00     Average packs/day: 0.5 packs/day for 12.0 years (6.0 ttl pk-yrs)     Types: Cigarettes     Start date: 1993     Quit date: 2005     Years since quittin.8     Passive exposure: Past    Smokeless tobacco: Never   Vaping Use    Vaping status: Never Used   Substance and Sexual Activity    Alcohol use: Yes     Alcohol/week: 2.0 standard drinks of alcohol     Types: 2 Glasses of wine per week     Comment: social    Drug use: Yes     Frequency: 2.0 times per week     Types: Marijuana     Comment: medical edible marijuana prescribed    Sexual activity: Yes     Partners: Male     Birth control/protection: None   Other Topics Concern    None   Social History Narrative    Most recent tobacco use screenin2019    Do you currently or have you served in the [employer]: No    Were you activated, into active duty, as a member of the [employer] or as a Reservist: No    Occupation: disabled    Education: 12    Marital status:     Exercise level: Occasional    Diet: Specific    low sugar, high fat    General stress level: High    Alcohol intake: Occasional    Caffeine intake: Heavy    Chewing tobacco: none    Illicit drugs: none    Guns present in home: No     Seat belts used routinely: Yes    Smoke alarm in home: Yes    Advance directive: Yes     Social Determinants of Health     Financial Resource Strain: Low Risk  (11/10/2022)    [facility]    Overall Financial Resource Strain (CARDIA)     Difficulty of Paying Living Expenses: Not hard at all   Food Insecurity: No Food Insecurity (11/10/2024)    Nursing - Inadequate Food Risk Classification     Worried About Running Out of Food in the Last Year: Never true     Ran Out of Food in the Last Year: Never true     Ran Out of Food in the Last Year: 1   Transportation Needs: No Transportation Needs (11/10/2024)    Nursing - Transportation Risk Classification     Lack of Transportation: Not on file     Lack of Transportation: 2   Physical Activity: Insufficiently Active (1/15/2024)    Exercise Vital Sign     Days of Exercise per Week: 7 days     Minutes of Exercise per Session: 20 min   Stress: No Stress Concern Present (1/15/2024)    [employer] - Occupational Stress Questionnaire     Feeling of Stress : Only a little   Social Connections: Unknown (2024)    Received from [facility]    Social Connections     How often do you feel lonely or isolated from those around you? (Adult - for ages 18 years and over): Not on file   Intimate Partner Violence: Unknown (11/10/2024)    Nursing IPS     Feels Physically and Emotionally Safe: Not on file     Physically Hurt by Someone: Not on file     Humiliated or Emotionally Abused by Someone: Not on file     Physically Hurt by Someone: 2     Hurt or Threatened by Someone: 2   Housing Stability: Unknown (11/10/2024)    Nursing: Inadequate Housing Risk Classification     Has Housing: Not on file     Worried About Losing Housing: Not on file     Unable to Get Utilities: Not on file     Unable to Pay for Housing in the Last Year: 2     Has Housin       Subjective      "    Objective    Physical Exam:   Assessment Type: (P) Assess only  General Appearance: (P) Awake, Alert  Respiratory Pattern: (P) Normal  Chest Assessment: (P) Chest expansion symmetrical  Bilateral Breath Sounds: (P) Coarse  Cough: (P) None, Non-productive  O2 Device: (P) RA    Vitals:  Blood pressure 122/79, pulse (P) 67, temperature 98.2 °F (36.8 °C), resp. rate (P) 18, height 5' 2\" (1.575 m), weight 52.2 kg (115 lb), last menstrual period 07/15/2024, SpO2 (P) 96%, not currently breastfeeding.          Imaging and other studies: Results Review Statement: No pertinent imaging studies reviewed.    O2 Device: (P) RA     Plan    Respiratory Plan: (P) Home Bronchodilator Patient pathway  Airway Clearance Plan: (P) Flutter     Resp Comments: (P) Protocol completed.  Pt w/ hx of CF and lung transplant X2; right pneumonectomy done.  Pt uses flutter at home and inhalers/neb.  Pt given flutter and willuse independently.   "

## 2024-11-12 NOTE — NURSING NOTE
Requested iv doxycycline dose from pharmacy, was told they send the dose to ED, in response, sent another message to pharmacy to send another dose to 4th floor. Still med not sent, 3rd message sent  to pharmacy again to request a dose.Informed Briana RICHARDS next shift on this issue.

## 2024-11-12 NOTE — CASE MANAGEMENT
Case Management Assessment & Discharge Planning Note    Patient name Maximilian Mann  Location /-01 MRN [id number]  : 1975 Date 2024       Current Admission Date: 11/10/2024  Current Admission Diagnosis:Angioedema   Patient Active Problem List    Diagnosis Date Noted Date Diagnosed    Angioedema 11/10/2024     Chest pain 2024     Pancytopenia, acquired ([facility]) 10/24/2024     History of pulmonary embolus (PE) 2024     History of DVT (deep vein thrombosis) 2024     Sore throat 2024     Vaginal yeast infection 2024     Intestinal metaplasia of antrum of stomach without dysplasia 2024     Hx of lung transplant ([facility]) 01/15/2024     Continuous opioid dependence ([facility]) 01/15/2024     Stage 3a chronic kidney disease ([facility]) 2023     Depressed mood 2023     Chronic pancreatitis, unspecified pancreatitis type ([facility]) 2023     Pulmonary embolus ([facility]) 2022     Right leg pain 10/13/2022     Chronic cough 2022     Renal calculus, left 2022     Pulmonary nodules 2022     Rib pain 2022     Gastroparesis 2022     Ulcerative pancolitis without complication ([facility]) 2022     Neuropathic pain of right foot 01/10/2022     Weakness of right lower extremity 2021     Pulmonary hypertension (HCC) 2021     Cystic fibrosis ([facility]) 2021     Essential hypertension 2021     Hypercholesteremia 2021     Long term current use of bisphosphonates 2020     Long term current use of systemic steroids 2020     Osteopenia 2020     Chronic pain disorder 2020     Asthma 2020     Nausea and vomiting 2020     Hiatal hernia 2019     Anemia 2018     DVT (deep venous thrombosis) ([facility]) 2018     Immunosuppression ([facility]) 2017     Colon polyp, personal history 2016     S/P pneumonectomy 2013     Anxiety 2012     Type 1 diabetes mellitus  with diabetic chronic kidney disease, unspecified CKD stage (HCC) 10/28/2010     Pancreatic insufficiency 10/28/2010     Chronic sinusitis 10/28/2010     GERD (gastroesophageal reflux disease) 10/28/2010       LOS (days): 0  Geometric Mean LOS (GMLOS) (days):   Days to GMLOS:     OBJECTIVE:              Current admission status: Observation       Preferred Pharmacy:   [facility] [place] [place] [facility] [address] [facility]  [address]  [place] [address]  Phone: [phone] Fax: [phone]    [facility]/pharmacy #[id number] - [place] - [address]  [address]  [place] [address]  Phone: [phone] Fax: [phone]    [facility]/pharmacy #[id number] - [place] [address]  [place] [address]  Phone: [phone] Fax: [phone]    Primary Care Provider: Solis Avila MD    Primary Insurance: BLUE CROSS  Secondary Insurance: MEDICARE    ASSESSMENT:  Active Health Care Proxies       WALLY MANN Health Care Representative - Spouse   Primary Phone: [phone] (Mobile)                 Advance Directives  Does patient have a Health Care POA?: No  Was patient offered paperwork?: Yes (Spouse to make decisions)  Does patient currently have a Health Care decision maker?: Yes, please see Health Care Proxy section  Does patient have Advance Directives?: No  Was patient offered paperwork?: Yes (Spouse to make decisions)  Primary Contact: Wally Mann, Spouse         Readmission Root Cause  30 Day Readmission: Yes  During your hospital stay, did someone (provider, nurse, ) explain your care to you in a way you could understand?: Yes  Did you feel medically stable to leave the hospital?: Yes  Were you able to pay for your medication at the pharmacy?: Yes  Did you have reliable transportation to take you to your appointments?: Yes  During previous admission, was a post-acute recommendation made?: No  Patient was readmitted due to:  Angioedema  Action Plan: Diuresis    Patient Information  Admitted from:: Home  Mental Status: Alert  During Assessment patient was accompanied by: Not accompanied during assessment  Assessment information provided by:: Patient  Primary Caregiver: Self  Support Systems: Spouse/significant other  County of Residence: [place]  What city do you live in?: [place]    Activities of Daily Living Prior to Admission  Functional Status: Independent  Completes ADLs independently?: Yes  Ambulates independently?: Yes  Does patient use assisted devices?: No  Does patient have a history of Outpatient Therapy (PT/OT)?: No  Does the patient have a history of Short-Term Rehab?: No  Does patient have a history of HHC?: Yes  Does patient currently have HHC?: Yes    Current Home Health Care  Type of Current Home Care Services: Nurse visit  Current Home Health Follow-Up Provider:: PCP    Patient Information Continued  Does patient have prescription coverage?: Yes    DISCHARGE DETAILS:    Discharge planning discussed with:: Patient  Freedom of Choice: Yes  Comments - Freedom of Choice: CM discussed discharge planning and freedom of choice if services are recommended by [facility]. Patient to resume HHC upon discharge unless otherwise recommended.  CM contacted family/caregiver?: No- see comments (Patient declined)  Were Treatment Team discharge recommendations reviewed with patient/caregiver?: Yes  Did patient/caregiver verbalize understanding of patient care needs?: Yes  Were patient/caregiver advised of the risks associated with not following Treatment Team discharge recommendations?: Yes      Requested Home Health Care         Is the patient interested in HHC at discharge?: Yes    DME Referral Provided  Referral made for DME?: No    Other Referral/Resources/Interventions Provided:  Interventions: HHC    Would you like to participate in our [facility] service program?  : No - Declined    Treatment Team Recommendation: Home with Home  Health Care  Discharge Destination Plan:: Home with Home Health Care         [facility] referral sent for HHC in [facility].

## 2024-11-12 NOTE — CONSULTS
"Consultation -  [facility]  Maximilian Mann 48 y.o. female MRN: [id number]  Unit/Bed#: -01 Encounter: [id number]      Inpatient consult to gastroenterology  Consult performed by: Apryl Ness PA-C  Consult ordered by: Yolanda Ferrara PA-C      Reason for Consult / Principal Problem: GERD, nausea, gastroparesis, pancreatic insufficiency    ASSESSMENT & PLAN:    Nausea, epigastric pain  GERD   Gastroparesis  Pancreatic insufficiency 2/2 Cystic fibrosis    - Patient has a history of chronic nausea, heartburn, and epigastric discomfort.  - She is s/p TIF in 2022.  - EGD 4/23/24 showed mild GE junction inflammation with somewhat \"snug\" GE junction.  Wrap noted to be intact.  - UGI series 10/4/24 with small hiatal hernia, gastric wrap above the diaphragm.  - Plan is for EGD with placement of BRAVO pH probe as an outpatient per Dr. Ko.  - CT this admission showed esophageal wall thickening, slightly decreased from prior exam.  - PPI BID. Carafate QID.  - Add a Reglan 5mg QID course.  - Antiemetics/Compazine prn.  - Continue PERT/Creon.  - Continued outpatient GI evaluation/management per Dr. Ko.    GI will sign off at this time, reconsult prn.    HPI: Maximilian Mann is a 48 y.o. year old female with a PMH of cystic fibrosis status post lung transplant x 2, pancreatic insufficiency, severe GERD, gastroparesis, diabetes mellitus, chronic kidney disease, chronic pain disorder, DVT maintained on Eliquis who presented with onset of facial/neck swelling and was admitted with angioedema.  Patient has a history of recurrent idiopathic angioedema.  GI was consulted due to her gastrointestinal symptoms.  She has a long chronic history of struggling with heartburn, epigastric discomfort, and nausea.  She denies any hematemesis.  She denies any melena.  She has a history of GERD, gastroparesis, and a pancreatic insufficiency.  She is status post TIF in 2022.  She previously had an " EGD back in April with Dr. Ko as noted above.  She is planned for an outpatient EGD with placement of Bravo pH probe with Dr. Ko for further evaluation.      REVIEW OF SYSTEMS:     CONSTITUTIONAL: Denies any fever, chills, or rigors.  HEENT: No earache or tinnitus. Denies hearing loss or visual disturbances.  CARDIOVASCULAR: No chest pain or palpitations.   RESPIRATORY: Denies any cough, hemoptysis, shortness of breath or dyspnea on exertion.  GASTROINTESTINAL: As noted in the History of Present Illness.   GENITOURINARY: No problems with urination. Denies any hematuria or dysuria.  NEUROLOGIC: No dizziness or vertigo, denies headaches.   MUSCULOSKELETAL: Denies any muscle or joint pain.   SKIN: Denies skin rashes or itching.   ENDOCRINE: Denies excessive thirst. Denies intolerance to heat or cold.  PSYCHOSOCIAL: Denies depression or anxiety. Denies any recent memory loss.     Historical Information   Past Medical History:   Diagnosis Date    Abnormal uterine bleeding (AUB)     ABPA (allergic bronchopulmonary aspergillosis) (Formerly [facility]) 07/08/2012    Acute kidney injury (HCC) 09/08/2017    Acute on chronic respiratory failure (Formerly [facility]) 03/04/2017    Last Assessment & Plan:  Formatting of this note might be different from the original. Recurrent with clinical worsening with enlarging PTX, s/p multiple chest tubes, but new fevers 6/19 and worsening leukocytosis indicating infection as cause of 6/19 decompensation with profound hypoxia in setting of shunting through right lung and continued pneumothorax s/p 2nd chest tube placement.  6/27: Right    Anaphylaxis 06/06/2022    Anemia     Asthma     Chest pain 09/21/2021    Chronic pain     Chronic respiratory failure with hypoxia (Formerly [facility]) 09/07/2021    COVID-19 06/03/2022    Cystic fibrosis (Formerly [facility])     Deep vein thrombosis (HCC)     Diabetes mellitus (HCC)     Fever 09/22/2024    GERD (gastroesophageal reflux disease)     HBP (high blood pressure)     HCAP (healthcare-associated  pneumonia) 03/06/2020    History of transfusion     Influenza B 02/23/2020    Kidney stone     Moderate protein-calorie malnutrition (HCC) 01/08/2022    Neutropenic fever  (HCC) 09/26/2020    Pneumonia due to Pseudomonas species (HCC) 10/28/2010    Last Assessment & Plan:  Formatting of this note might be different from the original. Polymicrobial pna w/ pseudomonas, M. Abscessus, Cadida glabrata/albicans    PONV (postoperative nausea and vomiting)     Pseudomonas aeruginosa infection 09/20/2012    Renal calculus     Squamous cell skin cancer 01/12/2023    SCCIS- Left upper thigh    Transplant recipient     Lung- 2013, 2017    Ulcerative colitis (HCC)     Wellness examination 02/04/2013    Formatting of this note might be different from the original. Discussed with patient and acknowledgement form signed on 2/4/2013 (ALYSHA)     Past Surgical History:   Procedure Laterality Date    APPENDECTOMY      AUGMENTATION MAMMAPLASTY Bilateral 2004    AUGMENTATION MAMMAPLASTY Right 2021    removed     BREAST SURGERY      Aug.     BRONCHOSCOPY      CHOLECYSTECTOMY      COLONOSCOPY      EGD  07/2019    Yeast Infect.     FL RETROGRADE PYELOGRAM  01/09/2020    FL RETROGRADE PYELOGRAM  02/04/2020    FL RETROGRADE PYELOGRAM  9/20/2024    GASTROSCOPY  03/01/2024    GASTROSTOMY TUBE, PLACE      IR BALLOON-OCCLUDED ANTEGRADE TRANSVENOUS OBLITERATION (BATO)  11/22/2022    IR PORT PLACEMENT  03/22/2022    IR PORT REMOVAL  01/10/2022    IR PORT STRIPPING  11/25/2022    IR TUNNELED CENTRAL LINE PLACEMENT  01/13/2022    LUNG SURGERY      LUNG TRANSPLANT      X2    NV CYSTO BLADDER W/URETERAL CATHETERIZATION Right 01/09/2020    Procedure: CYSTOSCOPY RETROGRADE PYELOGRAM WITH INSERTION STENT URETERAL;  Surgeon: Patricio Perry MD;  Location: [facility];  Service: Urology    NV CYSTO/URETERO W/LITHOTRIPSY &INDWELL STENT INSRT Right 02/04/2020    Procedure: CYSTOSCOPY URETEROSCOPY WITH LITHOTRIPSY HOLMIUM LASER, RETROGRADE PYELOGRAM AND  INSERTION STENT URETERAL;  Surgeon: Patricio Perry MD;  Location: [facility];  Service: Urology    OR CYSTO/URETERO W/LITHOTRIPSY &INDWELL STENT INSRT Left 2024    Procedure: CYSTOSCOPY URETEROSCOPY WITH LITHOTRIPSY HOLMIUM LASER, RETROGRADE PYELOGRAM AND INSERTION STENT URETERAL, BASKET STONE EXTRACTION;  Surgeon: Lee Briceno MD;  Location:  MAIN OR;  Service: Urology    SKIN BIOPSY      US GUIDED VASCULAR ACCESS  2017     Social History   Social History     Substance and Sexual Activity   Alcohol Use Yes    Alcohol/week: 2.0 standard drinks of alcohol    Types: 2 Glasses of wine per week    Comment: social     Social History     Substance and Sexual Activity   Drug Use Yes    Frequency: 2.0 times per week    Types: Marijuana    Comment: medical edible marijuana prescribed     Social History     Tobacco Use   Smoking Status Former    Current packs/day: 0.00    Average packs/day: 0.5 packs/day for 12.0 years (6.0 ttl pk-yrs)    Types: Cigarettes    Start date: 1993    Quit date: 2005    Years since quittin.8    Passive exposure: Past   Smokeless Tobacco Never     Family History   Problem Relation Age of Onset    Alcohol abuse Mother     Mental illness Mother     Depression Mother     Alcohol abuse Father     COPD Maternal Grandmother     Lung cancer Maternal Grandmother 65    Cancer Maternal Grandmother     COPD Maternal Grandfather     Cancer Maternal Grandfather     COPD Paternal Grandmother     Lung cancer Paternal Grandmother 65    No Known Problems Sister     No Known Problems Paternal Aunt     Breast cancer Neg Hx     Ovarian cancer Neg Hx     Colon cancer Neg Hx        Meds/Allergies       Medications Prior to Admission:     acetaminophen (TYLENOL) 325 mg tablet    albuterol (2.5 mg/3 mL) 0.083 % nebulizer solution    albuterol (PROVENTIL HFA,VENTOLIN HFA) 90 mcg/act inhaler    Ascorbic Acid (vitamin C) 100 MG tablet    atovaquone (MEPRON) 750 mg/5 mL suspension     budesonide-formoterol (SYMBICORT) 160-4.5 mcg/act inhaler    Cholecalciferol (Vitamin D3) 125 MCG (5000 UT) TABS    Continuous Glucose Sensor (Dexcom G7 Sensor)    dexlansoprazole (DEXILANT) 60 MG capsule    famotidine (PEPCID) 40 MG tablet    oxybutynin (DITROPAN XL) 15 MG 24 hr tablet    oxyCODONE (ROXICODONE) 10 MG TABS    pancrelipase, Lip-Prot-Amyl, (CREON) 24,000 units    predniSONE 10 mg tablet    pregabalin (LYRICA) 150 mg capsule    Respiratory Therapy Supplies (Nebulizer) SUKHI    tacrolimus (PROGRAF) 1 mg capsule    valACYclovir (VALTREX) 500 mg tablet    ALPRAZolam (XANAX) 1 mg tablet    apixaban (ELIQUIS) 5 mg    azithromycin (ZITHROMAX) 250 mg tablet    BD Pen Needle Abi U/F 32G X 4 MM MISC    bisacodyl (DULCOLAX) 5 mg EC tablet    Continuous Blood Gluc  (Dexcom G7 ) SUKHI    dronabinol (MARINOL) 5 MG capsule    fluconazole (DIFLUCAN) 150 mg tablet    furosemide (LASIX) 20 mg tablet    insulin aspart (NovoLOG FlexPen) 100 UNIT/ML injection pen    Insulin Disposable Pump (Omnipod 5 G6 Intro, Gen 5,) KIT    Insulin Disposable Pump (Omnipod 5 G6 Pods, Gen 5,) MISC    Insulin Glargine-yfgn (Semglee, yfgn,) 100 UNIT/ML SOPN    Insulin Pen Needle (Comfort EZ Pen Needles) 33G X 4 MM MISC    multivitamin (THERAGRAN) TABS    naloxone (NARCAN) 4 mg/0.1 mL nasal spray    ondansetron (ZOFRAN) 4 mg tablet    polyethylene glycol (MIRALAX) 17 g packet    tamsulosin (FLOMAX) 0.4 mg    Turmeric 500 MG CAPS    vitamin B-12 (VITAMIN B-12) 1,000 mcg tablet    Current Facility-Administered Medications:     acetaminophen (TYLENOL) tablet 650 mg, Q6H PRN    acetaminophen (TYLENOL) tablet 975 mg, Q8H DOTTY    albuterol inhalation solution 5 mg, Q4H PRN    ALPRAZolam (XANAX) tablet 1 mg, HS PRN    apixaban (ELIQUIS) tablet 5 mg, BID    atovaquone (MEPRON) oral suspension 1,500 mg, Daily    azithromycin (ZITHROMAX) tablet 250 mg, Once per day on Monday Wednesday Friday    budesonide-formoterol (SYMBICORT) 160-4.5  mcg/act inhaler 2 puff, Q12H    calcium carbonate (TUMS) chewable tablet 500 mg, Daily PRN    dicyclomine (BENTYL) capsule 20 mg, 4x Daily (AC & HS)    diphenhydrAMINE (BENADRYL) injection 25 mg, Q6H PRN    doxycycline (VIBRAMYCIN) 100 mg in sodium chloride 0.9 % 100 mL IVPB, Q12H, Last Rate: 100 mg (11/12/24 1047)    famotidine (PEPCID) tablet 40 mg, HS    HYDROmorphone (DILAUDID) injection 0.5 mg, Q4H PRN    insulin glargine (LANTUS) subcutaneous injection 12 Units 0.12 mL, QAM    insulin lispro (HumALOG/ADMELOG) 100 units/mL subcutaneous injection 10 Units, Daily With Breakfast    insulin lispro (HumALOG/ADMELOG) 100 units/mL subcutaneous injection 12 Units, Daily With Dinner    insulin lispro (HumALOG/ADMELOG) 100 units/mL subcutaneous injection 2-7 Units, TID AC **AND** Fingerstick Glucose (POCT), TID AC    insulin lispro (HumALOG/ADMELOG) 100 units/mL subcutaneous injection 8 Units, Daily With Lunch    lidocaine (LIDODERM) 5 % patch 1 patch, Daily    metoclopramide (REGLAN) injection 5 mg, Q6H DOTTY    naloxone (NARCAN) 0.04 mg/mL syringe 0.04 mg, Q1MIN PRN    oxyCODONE (ROXICODONE) IR tablet 15 mg, Q4H PRN **AND** oxyCODONE (ROXICODONE) immediate release tablet 20 mg, Q4H PRN    pancrelipase (Lip-Prot-Amyl) (CREON) delayed release capsule 24,000 Units, TID PRN    pancrelipase (Lip-Prot-Amyl) (CREON) delayed release capsule 48,000 Units, TID With Meals    pantoprazole (PROTONIX) EC tablet 40 mg, BID AC    predniSONE tablet 15 mg, Daily    pregabalin (LYRICA) capsule 150 mg, BID    sucralfate (CARAFATE) tablet 1 g, TID AC    tacrolimus (PROGRAF) capsule 2 mg, BID    valACYclovir (VALTREX) tablet 500 mg, Daily    Allergies   Allergen Reactions    Bebtelovimab Anaphylaxis    Vancomycin Angioedema and Hives    Gabapentin Hallucinations    Ceftazidime Headache     Severe headaches after desensitization    Nsaids Other (See Comments)     Lung transplant increases risk of renal toxicity, call lung txp provider to  "discuss if needed       Objective   Blood pressure 122/79, pulse 71, temperature 98.2 °F (36.8 °C), resp. rate 18, height 5' 2\" (1.575 m), weight 52.2 kg (115 lb), last menstrual period 07/15/2024, SpO2 94%, not currently breastfeeding.    Intake/Output Summary (Last 24 hours) at 11/12/2024 1121  Last data filed at 11/12/2024 0155  Gross per 24 hour   Intake 420 ml   Output 0 ml   Net 420 ml         PHYSICAL EXAM:      General Appearance:   Alert, cooperative, no distress, appears stated age    HEENT:   Normocephalic, atraumatic, anicteric    Neck:  Supple, symmetrical, trachea midline, no adenopathy;    thyroid: no enlargement/tenderness/nodules; no carotid  bruit or JVD    Lungs:   Clear to auscultation bilaterally; no rales, rhonchi or wheezing; respirations unlabored    Heart::   S1 and S2 normal; regular rate and rhythm; no murmur, rub, or gallop.   Abdomen:   Soft, non-tender, non-distended; normal bowel sounds; no masses, no organomegaly    Genitalia:   Deferred    Rectal:   Deferred    Extremities:  No cyanosis, clubbing    Pulses:  2+ and symmetric all extremities    Skin:  Skin color, texture, turgor normal, no rashes or lesions    Lymph nodes:  No palpable cervical, axillary or inguinal lymphadenopathy        Lab Results:   Admission on 11/10/2024   Component Date Value    WBC 11/10/2024 6.87     RBC 11/10/2024 3.39 (L)     Hemoglobin 11/10/2024 9.3 (L)     Hematocrit 11/10/2024 31.9 (L)     MCV 11/10/2024 94     MCH 11/10/2024 27.4     MCHC 11/10/2024 29.2 (L)     RDW 11/10/2024 17.3 (H)     MPV 11/10/2024 10.3     Platelets 11/10/2024 138 (L)     nRBC 11/10/2024 0     Segmented % 11/10/2024 64     Immature Grans % 11/10/2024 0     Lymphocytes % 11/10/2024 25     Monocytes % 11/10/2024 10     Eosinophils Relative 11/10/2024 1     Basophils Relative 11/10/2024 0     Absolute Neutrophils 11/10/2024 4.40     Absolute Immature Grans 11/10/2024 0.02     Absolute Lymphocytes 11/10/2024 1.71     Absolute " Monocytes 11/10/2024 0.67     Eosinophils Absolute 11/10/2024 0.06     Basophils Absolute 11/10/2024 0.01     Sodium 11/10/2024 143     Potassium 11/10/2024 4.1     Chloride 11/10/2024 100     CO2 11/10/2024 37 (H)     ANION GAP 11/10/2024 6     BUN 11/10/2024 21     Creatinine 11/10/2024 1.22     Glucose 11/10/2024 78     Calcium 11/10/2024 7.7 (L)     Corrected Calcium 11/10/2024 8.6     AST 11/10/2024 19     ALT 11/10/2024 22     Alkaline Phosphatase 11/10/2024 65     Total Protein 11/10/2024 5.4 (L)     Albumin 11/10/2024 2.9 (L)     Total Bilirubin 11/10/2024 0.35     eGFR 11/10/2024 53     hs TnI 0hr 11/10/2024 11     TACROLIMUS 11/10/2024 9.5     SARS COV Rapid Antigen 11/10/2024 Negative     Influenza A Rapid Antigen 11/10/2024 Negative     Influenza B Rapid Antigen 11/10/2024 Negative     Ventricular Rate 11/10/2024 74     Atrial Rate 11/10/2024 74     MT Interval 11/10/2024 126     QRSD Interval 11/10/2024 72     QT Interval 11/10/2024 412     QTC Interval 11/10/2024 457     P Axis 11/10/2024 49     QRS Soper 11/10/2024 64     T Wave Soper 11/10/2024 66     hs TnI 2hr 11/10/2024 9     Delta 2hr hsTnI 11/10/2024 -2     hs TnI 4hr 11/10/2024 10     Delta 4hr hsTnI 11/10/2024 -1     Sputum Culture 11/11/2024 Culture results to follow.     Sputum Culture 11/11/2024 4+ Growth of Beta Hemolytic Streptococcus Group B (A)     Gram Stain Result 11/11/2024 1+ Epithelial cells per low power field (A)     Gram Stain Result 11/11/2024 3+ Gram positive cocci in pairs (A)     Gram Stain Result 11/11/2024 No polys seen (A)     POC Glucose 11/10/2024 96     POC Glucose 11/10/2024 162 (H)     POC Glucose 11/11/2024 348 (H)     Sodium 11/11/2024 133 (L)     Potassium 11/11/2024 5.1     Chloride 11/11/2024 98     CO2 11/11/2024 29     ANION GAP 11/11/2024 6     BUN 11/11/2024 20     Creatinine 11/11/2024 1.05     Glucose 11/11/2024 257 (H)     Calcium 11/11/2024 7.9 (L)     Corrected Calcium 11/11/2024 8.9     AST 11/11/2024 18      ALT 11/11/2024 23     Alkaline Phosphatase 11/11/2024 68     Total Protein 11/11/2024 5.4 (L)     Albumin 11/11/2024 2.8 (L)     Total Bilirubin 11/11/2024 0.33     eGFR 11/11/2024 63     WBC 11/11/2024 7.18     RBC 11/11/2024 3.18 (L)     Hemoglobin 11/11/2024 8.7 (L)     Hematocrit 11/11/2024 29.8 (L)     MCV 11/11/2024 94     MCH 11/11/2024 27.4     MCHC 11/11/2024 29.2 (L)     RDW 11/11/2024 17.4 (H)     Platelets 11/11/2024 132 (L)     MPV 11/11/2024 10.6     POC Glucose 11/11/2024 150 (H)     POC Glucose 11/11/2024 92     WBC 11/12/2024 5.31     RBC 11/12/2024 3.07 (L)     Hemoglobin 11/12/2024 8.4 (L)     Hematocrit 11/12/2024 29.4 (L)     MCV 11/12/2024 96     MCH 11/12/2024 27.4     MCHC 11/12/2024 28.6 (L)     RDW 11/12/2024 17.6 (H)     MPV 11/12/2024 10.2     Platelets 11/12/2024 112 (L)     nRBC 11/12/2024 0     Segmented % 11/12/2024 63     Immature Grans % 11/12/2024 0     Lymphocytes % 11/12/2024 24     Monocytes % 11/12/2024 12     Eosinophils Relative 11/12/2024 1     Basophils Relative 11/12/2024 0     Absolute Neutrophils 11/12/2024 3.28     Absolute Immature Grans 11/12/2024 0.02     Absolute Lymphocytes 11/12/2024 1.29     Absolute Monocytes 11/12/2024 0.65     Eosinophils Absolute 11/12/2024 0.06     Basophils Absolute 11/12/2024 0.01     Sodium 11/12/2024 134 (L)     Potassium 11/12/2024 5.1     Chloride 11/12/2024 101     CO2 11/12/2024 29     ANION GAP 11/12/2024 4     BUN 11/12/2024 21     Creatinine 11/12/2024 0.93     Glucose 11/12/2024 281 (H)     Calcium 11/12/2024 8.0 (L)     Corrected Calcium 11/12/2024 8.9     AST 11/12/2024 14     ALT 11/12/2024 19     Alkaline Phosphatase 11/12/2024 63     Total Protein 11/12/2024 5.3 (L)     Albumin 11/12/2024 2.9 (L)     Total Bilirubin 11/12/2024 0.27     eGFR 11/12/2024 73     Procalcitonin 11/12/2024 9.85 (H)     POC Glucose 11/12/2024 275 (H)     POC Glucose 11/12/2024 107        Imaging Studies: I have personally reviewed pertinent  imaging studies.      Apryl Ness PA-C  11/12/2024,11:21 AM

## 2024-11-12 NOTE — PLAN OF CARE
Problem: Potential for Falls  Goal: Patient will remain free of falls  Description: INTERVENTIONS:  - Educate patient/family on patient safety including physical limitations  - Instruct patient to call for assistance with activity   - Consult OT/PT to assist with strengthening/mobility   - Keep Call bell within reach  - Keep bed low and locked with side rails adjusted as appropriate  - Keep care items and personal belongings within reach  - Initiate and maintain comfort rounds  - Make Fall Risk Sign visible to staff  - Offer Toileting every  Hours, in advance of need  - Initiate/Maintain alarm  - Obtain necessary fall risk management equipment:   - Apply yellow socks and bracelet for high fall risk patients  - Consider moving patient to room near nurses station  Outcome: Progressing     Problem: PAIN - ADULT  Goal: Verbalizes/displays adequate comfort level or baseline comfort level  Description: Interventions:  - Encourage patient to monitor pain and request assistance  - Assess pain using appropriate pain scale  - Administer analgesics based on type and severity of pain and evaluate response  - Implement non-pharmacological measures as appropriate and evaluate response  - Consider cultural and social influences on pain and pain management  - Notify physician/advanced practitioner if interventions unsuccessful or patient reports new pain  Outcome: Progressing     Problem: INFECTION - ADULT  Goal: Absence or prevention of progression during hospitalization  Description: INTERVENTIONS:  - Assess and monitor for signs and symptoms of infection  - Monitor lab/diagnostic results  - Monitor all insertion sites, i.e. indwelling lines, tubes, and drains  - Monitor endotracheal if appropriate and nasal secretions for changes in amount and color  - Wayside appropriate cooling/warming therapies per order  - Administer medications as ordered  - Instruct and encourage patient and family to use good hand hygiene technique  -  Identify and instruct in appropriate isolation precautions for identified infection/condition  Outcome: Progressing  Goal: Absence of fever/infection during neutropenic period  Description: INTERVENTIONS:  - Monitor WBC    Outcome: Progressing     Problem: SAFETY ADULT  Goal: Patient will remain free of falls  Description: INTERVENTIONS:  - Educate patient/family on patient safety including physical limitations  - Instruct patient to call for assistance with activity   - Consult OT/PT to assist with strengthening/mobility   - Keep Call bell within reach  - Keep bed low and locked with side rails adjusted as appropriate  - Keep care items and personal belongings within reach  - Initiate and maintain comfort rounds  - Make Fall Risk Sign visible to staff  - Offer Toileting every  Hours, in advance of need  - Initiate/Maintain alarm  - Obtain necessary fall risk management equipment:   - Apply yellow socks and bracelet for high fall risk patients  - Consider moving patient to room near nurses station  Outcome: Progressing  Goal: Maintain or return to baseline ADL function  Description: INTERVENTIONS:  -  Assess patient's ability to carry out ADLs; assess patient's baseline for ADL function and identify physical deficits which impact ability to perform ADLs (bathing, care of mouth/teeth, toileting, grooming, dressing, etc.)  - Assess/evaluate cause of self-care deficits   - Assess range of motion  - Assess patient's mobility; develop plan if impaired  - Assess patient's need for assistive devices and provide as appropriate  - Encourage maximum independence but intervene and supervise when necessary  - Involve family in performance of ADLs  - Assess for home care needs following discharge   - Consider OT consult to assist with ADL evaluation and planning for discharge  - Provide patient education as appropriate  Outcome: Progressing  Goal: Maintains/Returns to pre admission functional level  Description:  INTERVENTIONS:  - Perform AM-PAC 6 Click Basic Mobility/ Daily Activity assessment daily.  - Set and communicate daily mobility goal to care team and patient/family/caregiver.   - Collaborate with rehabilitation services on mobility goals if consulted  - Perform Range of Bruce times a day.  - Reposition patient every  hours.  - Dangle patient  times a day  - Stand patient  times a day  - Ambulate patient  times a day  - Out of bed to chair  times a day   - Out of bed for meals  times a day  - Out of bed for toileting  - Record patient progress and toleration of activity level   Outcome: Progressing     Problem: RESPIRATORY - ADULT  Goal: Achieves optimal ventilation and oxygenation  Description: INTERVENTIONS:  - Assess for changes in respiratory status  - Assess for changes in mentation and behavior  - Position to facilitate oxygenation and minimize respiratory effort  - Oxygen administered by appropriate delivery if ordered  - Initiate smoking cessation education as indicated  - Encourage broncho-pulmonary hygiene including cough, deep breathe, Incentive Spirometry  - Assess the need for suctioning and aspirate as needed  - Assess and instruct to report SOB or any respiratory difficulty  - Respiratory Therapy support as indicated  Outcome: Progressing

## 2024-11-12 NOTE — APP STUDENT NOTE
"Progress Note: 11/12/2024    Subjective:  CC: \"Facial Swelling\" since waking up, 11/10/2024    HPI:  47 y/o Female presents today with PMH of progressive \"end stage\" cystic fibrosis and s/p lung transplant with chronic rejection maintained on tacrolimus/prednisone. Since being admitted from the ED on 11/10 her facial swelling has resolved. She currently presents with epigastric pain and substernal heaviness since 11/11.     Patient is tearful this AM, reporting that epigastric pain is still present with substernal heaviness and sharp pain in the lateral aspect of her ribs. She states that it hurts to take deep breaths. Patient states that she is need of urgent medication due to her pain and discomfort. She has an appetite and is drinking, but is unable to eat due to pain. Denies any nausea, vomiting, or chills. States that GI just stopped by for consultation. Patient was able to get some sleep during the night.     Objective:  Temp:  [98.2 °F (36.8 °C)-98.7 °F (37.1 °C)] 98.2 °F (36.8 °C)  HR:  [70-79] 71  BP: (111-150)/(66-83) 122/79  Resp:  [16-18] 18  SpO2:  [94 %-95 %] 94 %  O2 Device: None (Room air)     Body mass index is 21.03 kg/m².      Input and Output Summary (last 24 hours):      Intake/Output Summary (Last 24 hours) at 11/12/2024 0851  Last data filed at 11/12/2024 0155      Gross per 24 hour   Intake 642 ml   Output 0 ml   Net 642 ml      Physical Exam:  Vitals and nursing note reviewed.  Constitutional:  Patient is awake and alert. Appears in acute distress and in tears. She is pale and ill appearing.  CV: RRR  Pulmonary: Patient appeared in mild respiratory distress with inability to take nonpainful deep respirations. Bilateral rhonchi noted. Currently SpO2 94% with no air device being utilized currently.   Skin: Mild pale discoloration     Lab work:   Results from last 7 days   Lab Units 11/12/24  0520   WBC Thousand/uL 5.31   HEMOGLOBIN g/dL 8.4*   HEMATOCRIT % 29.4*   PLATELETS Thousands/uL 112* " "  SEGS PCT % 63   LYMPHO PCT % 24   MONO PCT % 12   EOS PCT % 1           Results from last 7 days   Lab Units 11/12/24  0520   SODIUM mmol/L 134*   POTASSIUM mmol/L 5.1   CHLORIDE mmol/L 101   CO2 mmol/L 29   BUN mg/dL 21   CREATININE mg/dL 0.93   ANION GAP mmol/L 4   CALCIUM mg/dL 8.0*   ALBUMIN g/dL 2.9*   TOTAL BILIRUBIN mg/dL 0.27   ALK PHOS U/L 63   ALT U/L 19   AST U/L 14   GLUCOSE RANDOM mg/dL 281*                    Results from last 7 days   Lab Units 11/12/24  0624 11/11/24  1638 11/11/24  1048 11/11/24  0606 11/10/24  2122 11/10/24  1938   POC GLUCOSE mg/dl 275* 92 150* 348* 162* 96               Results from last 7 days   Lab Units 11/12/24  0520   PROCALCITONIN ng/ml 9.85*         Recent Cultures (last 7 days):        Results from last 7 days   Lab Units 11/11/24  1205   GRAM STAIN RESULT   1+ Epithelial cells per low power field*  3+ Gram positive cocci in pairs*  No polys seen*      Assessment & Plan:  Angioedema  History of recurrent idiopathic angioedema presented to ED with sudden onset facial/neck swelling when she woke up 11/10/24.    CT soft tissue neck no acute findings  CT chest no acute findings  CBC, CMP wnl for patient, hsTnI normal  s/p Pepcid/Dilaudid/IV fluid bolus + IV solumedrol x1 in ER; symptoms resolved at present time   ED provider discussed with patient's transplant team at [facility] and they recommended patient be admitted here for overnight observation  Continue oral prednisone 15 mg daily as per home dose   This issue appears resolved at present time  Cystic fibrosis (HCC)  History of cystic fibrosis s/p 2 previous lung transplant with chronic rejection on tacrolimus/prednisone. Patient reports she is \"end-stage\" at this point, and not a candidate for another transplant  Continue chronic Mepron/Zithromax/Valtrex  11/10 ED provider discussed case with [facility] transplant team on-call who did not feel transfer necessary at time of presentation  Hx of lung transplant (HCC)  History of " chronic rejection on tacrolimus/prednisone.  History of pulmonary embolus (PE)  Continue Eliquis.  Type 1 diabetes mellitus with diabetic chronic kidney disease, unspecified CKD stage (HCC)  Blood Sugar Average: Last 72 hrs:(P) [id number]  Lantus 12 units nightly ordered  Sliding scale coverage with ACHS checks  Consistent carb diet  Hypoglycemia protocol  Random Blood Glucose 11/12 0624 was elevated at 275 ng/ml  Chronic pain disorder  Maintained chronically on oral oxycodone 10 mg every 4 hours as needed.  GERD (gastroesophageal reflux disease)  Continue Pepcid, add pantoprazole and carafate due to esophagitis on imaging. Can trial bentyl as well  Is due to EGD as well as Bravo pH test    ADIA GarzonS 11/12/2024

## 2024-11-12 NOTE — NURSING NOTE
Patient requesting 4 sugar packets, nurse informed the patient that due to her diabetic state and uprising of current blood sugar, that it is not recommended to have any regular sugar. Patient stated that it is her body, and she knows how it works, still demanding her sugar packets.

## 2024-11-12 NOTE — PLAN OF CARE
Problem: Potential for Falls  Goal: Patient will remain free of falls  Description: INTERVENTIONS:  - Educate patient/family on patient safety including physical limitations  - Instruct patient to call for assistance with activity   - Consult OT/PT to assist with strengthening/mobility   - Keep Call bell within reach  - Keep bed low and locked with side rails adjusted as appropriate  - Keep care items and personal belongings within reach  - Initiate and maintain comfort rounds  - Make Fall Risk Sign visible to staff  - Offer Toileting every  Hours, in advance of need  - Initiate/Maintain alarm  - Obtain necessary fall risk management equipment:   - Apply yellow socks and bracelet for high fall risk patients  - Consider moving patient to room near nurses station  Outcome: Progressing     Problem: PAIN - ADULT  Goal: Verbalizes/displays adequate comfort level or baseline comfort level  Description: Interventions:  - Encourage patient to monitor pain and request assistance  - Assess pain using appropriate pain scale  - Administer analgesics based on type and severity of pain and evaluate response  - Implement non-pharmacological measures as appropriate and evaluate response  - Consider cultural and social influences on pain and pain management  - Notify physician/advanced practitioner if interventions unsuccessful or patient reports new pain  Outcome: Progressing     Problem: INFECTION - ADULT  Goal: Absence or prevention of progression during hospitalization  Description: INTERVENTIONS:  - Assess and monitor for signs and symptoms of infection  - Monitor lab/diagnostic results  - Monitor all insertion sites, i.e. indwelling lines, tubes, and drains  - Monitor endotracheal if appropriate and nasal secretions for changes in amount and color  - [place] appropriate cooling/warming therapies per order  - Administer medications as ordered  - Instruct and encourage patient and family to use good hand hygiene technique  -  Identify and instruct in appropriate isolation precautions for identified infection/condition  Outcome: Progressing  Goal: Absence of fever/infection during neutropenic period  Description: INTERVENTIONS:  - Monitor WBC    Outcome: Progressing     Problem: SAFETY ADULT  Goal: Patient will remain free of falls  Description: INTERVENTIONS:  - Educate patient/family on patient safety including physical limitations  - Instruct patient to call for assistance with activity   - Consult OT/PT to assist with strengthening/mobility   - Keep Call bell within reach  - Keep bed low and locked with side rails adjusted as appropriate  - Keep care items and personal belongings within reach  - Initiate and maintain comfort rounds  - Make Fall Risk Sign visible to staff  - Offer Toileting every  Hours, in advance of need  - Initiate/Maintain alarm  - Obtain necessary fall risk management equipment:   - Apply yellow socks and bracelet for high fall risk patients  - Consider moving patient to room near nurses station  Outcome: Progressing  Goal: Maintain or return to baseline ADL function  Description: INTERVENTIONS:  -  Assess patient's ability to carry out ADLs; assess patient's baseline for ADL function and identify physical deficits which impact ability to perform ADLs (bathing, care of mouth/teeth, toileting, grooming, dressing, etc.)  - Assess/evaluate cause of self-care deficits   - Assess range of motion  - Assess patient's mobility; develop plan if impaired  - Assess patient's need for assistive devices and provide as appropriate  - Encourage maximum independence but intervene and supervise when necessary  - Involve family in performance of ADLs  - Assess for home care needs following discharge   - Consider OT consult to assist with ADL evaluation and planning for discharge  - Provide patient education as appropriate  Outcome: Progressing  Goal: Maintains/Returns to pre admission functional level  Description:  INTERVENTIONS:  - Perform AM-PAC 6 Click Basic Mobility/ Daily Activity assessment daily.  - Set and communicate daily mobility goal to care team and patient/family/caregiver.   - Collaborate with rehabilitation services on mobility goals if consulted  - Perform Range of Motion  times a day.  - Reposition patient every  hours.  - Dangle patient  times a day  - Stand patient  times a day  - Ambulate patient  times a day  - Out of bed to chair  times a day   - Out of bed for meals  times a day  - Out of bed for toileting  - Record patient progress and toleration of activity level   Outcome: Progressing     Problem: RESPIRATORY - ADULT  Goal: Achieves optimal ventilation and oxygenation  Description: INTERVENTIONS:  - Assess for changes in respiratory status  - Assess for changes in mentation and behavior  - Position to facilitate oxygenation and minimize respiratory effort  - Oxygen administered by appropriate delivery if ordered  - Initiate smoking cessation education as indicated  - Encourage broncho-pulmonary hygiene including cough, deep breathe, Incentive Spirometry  - Assess the need for suctioning and aspirate as needed  - Assess and instruct to report SOB or any respiratory difficulty  - Respiratory Therapy support as indicated  Outcome: Progressing

## 2024-11-13 PROBLEM — T78.3XXA ANGIOEDEMA: Status: RESOLVED | Noted: 2024-11-10 | Resolved: 2024-11-13

## 2024-11-13 LAB
ANION GAP SERPL CALCULATED.3IONS-SCNC: 6 MMOL/L (ref 4–13)
BASOPHILS # BLD AUTO: 0.01 THOUSANDS/ÂΜL (ref 0–0.1)
BASOPHILS NFR BLD AUTO: 0 % (ref 0–1)
BUN SERPL-MCNC: 20 MG/DL (ref 5–25)
CALCIUM SERPL-MCNC: 8 MG/DL (ref 8.4–10.2)
CHLORIDE SERPL-SCNC: 102 MMOL/L (ref 96–108)
CO2 SERPL-SCNC: 27 MMOL/L (ref 21–32)
CREAT SERPL-MCNC: 1.02 MG/DL (ref 0.6–1.3)
EOSINOPHIL # BLD AUTO: 0.04 THOUSAND/ÂΜL (ref 0–0.61)
EOSINOPHIL NFR BLD AUTO: 1 % (ref 0–6)
ERYTHROCYTE [DISTWIDTH] IN BLOOD BY AUTOMATED COUNT: 17.4 % (ref 11.6–15.1)
GFR SERPL CREATININE-BSD FRML MDRD: 66 ML/MIN/1.73SQ M
GLUCOSE SERPL-MCNC: 237 MG/DL (ref 65–140)
GLUCOSE SERPL-MCNC: 267 MG/DL (ref 65–140)
GLUCOSE SERPL-MCNC: 294 MG/DL (ref 65–140)
GLUCOSE SERPL-MCNC: 93 MG/DL (ref 65–140)
HCT VFR BLD AUTO: 30.5 % (ref 34.8–46.1)
HGB BLD-MCNC: 8.5 G/DL (ref 11.5–15.4)
IMM GRANULOCYTES # BLD AUTO: 0.02 THOUSAND/UL (ref 0–0.2)
IMM GRANULOCYTES NFR BLD AUTO: 0 % (ref 0–2)
LYMPHOCYTES # BLD AUTO: 1.25 THOUSANDS/ÂΜL (ref 0.6–4.47)
LYMPHOCYTES NFR BLD AUTO: 25 % (ref 14–44)
MAGNESIUM SERPL-MCNC: 1.5 MG/DL (ref 1.9–2.7)
MCH RBC QN AUTO: 26.7 PG (ref 26.8–34.3)
MCHC RBC AUTO-ENTMCNC: 27.9 G/DL (ref 31.4–37.4)
MCV RBC AUTO: 96 FL (ref 82–98)
MONOCYTES # BLD AUTO: 0.62 THOUSAND/ÂΜL (ref 0.17–1.22)
MONOCYTES NFR BLD AUTO: 12 % (ref 4–12)
NEUTROPHILS # BLD AUTO: 3.1 THOUSANDS/ÂΜL (ref 1.85–7.62)
NEUTS SEG NFR BLD AUTO: 62 % (ref 43–75)
NRBC BLD AUTO-RTO: 0 /100 WBCS
PLATELET # BLD AUTO: 127 THOUSANDS/UL (ref 149–390)
PMV BLD AUTO: 10.5 FL (ref 8.9–12.7)
POTASSIUM SERPL-SCNC: 5.2 MMOL/L (ref 3.5–5.3)
PROCALCITONIN SERPL-MCNC: 4.33 NG/ML
RBC # BLD AUTO: 3.18 MILLION/UL (ref 3.81–5.12)
SODIUM SERPL-SCNC: 135 MMOL/L (ref 135–147)
WBC # BLD AUTO: 5.04 THOUSAND/UL (ref 4.31–10.16)

## 2024-11-13 PROCEDURE — 80048 BASIC METABOLIC PNL TOTAL CA: CPT

## 2024-11-13 PROCEDURE — 82948 REAGENT STRIP/BLOOD GLUCOSE: CPT

## 2024-11-13 PROCEDURE — 99232 SBSQ HOSP IP/OBS MODERATE 35: CPT | Performed by: PHYSICIAN ASSISTANT

## 2024-11-13 PROCEDURE — 85025 COMPLETE CBC W/AUTO DIFF WBC: CPT

## 2024-11-13 PROCEDURE — 83735 ASSAY OF MAGNESIUM: CPT

## 2024-11-13 PROCEDURE — 84145 PROCALCITONIN (PCT): CPT | Performed by: STUDENT IN AN ORGANIZED HEALTH CARE EDUCATION/TRAINING PROGRAM

## 2024-11-13 RX ORDER — CYCLOBENZAPRINE HCL 10 MG
5 TABLET ORAL ONCE
Status: COMPLETED | OUTPATIENT
Start: 2024-11-13 | End: 2024-11-13

## 2024-11-13 RX ORDER — LIDOCAINE 50 MG/G
1 PATCH TOPICAL ONCE
Status: DISCONTINUED | OUTPATIENT
Start: 2024-11-13 | End: 2024-11-13

## 2024-11-13 RX ORDER — HYDROMORPHONE HCL/PF 1 MG/ML
0.5 SYRINGE (ML) INJECTION EVERY 4 HOURS PRN
Status: DISCONTINUED | OUTPATIENT
Start: 2024-11-13 | End: 2024-11-18 | Stop reason: HOSPADM

## 2024-11-13 RX ORDER — HYDROMORPHONE HCL/PF 1 MG/ML
1 SYRINGE (ML) INJECTION EVERY 4 HOURS PRN
Status: DISCONTINUED | OUTPATIENT
Start: 2024-11-13 | End: 2024-11-13

## 2024-11-13 RX ORDER — NYSTATIN 100000 [USP'U]/ML
500000 SUSPENSION ORAL 4 TIMES DAILY
Status: DISCONTINUED | OUTPATIENT
Start: 2024-11-13 | End: 2024-11-18 | Stop reason: HOSPADM

## 2024-11-13 RX ORDER — MAGNESIUM SULFATE HEPTAHYDRATE 40 MG/ML
2 INJECTION, SOLUTION INTRAVENOUS ONCE
Status: COMPLETED | OUTPATIENT
Start: 2024-11-13 | End: 2024-11-13

## 2024-11-13 RX ORDER — GUAIFENESIN 600 MG/1
1200 TABLET, EXTENDED RELEASE ORAL EVERY 12 HOURS SCHEDULED
Status: DISCONTINUED | OUTPATIENT
Start: 2024-11-13 | End: 2024-11-18 | Stop reason: HOSPADM

## 2024-11-13 RX ORDER — CYCLOBENZAPRINE HCL 10 MG
5 TABLET ORAL 3 TIMES DAILY PRN
Status: DISCONTINUED | OUTPATIENT
Start: 2024-11-13 | End: 2024-11-18 | Stop reason: HOSPADM

## 2024-11-13 RX ADMIN — PREGABALIN 150 MG: 75 CAPSULE ORAL at 08:14

## 2024-11-13 RX ADMIN — BUDESONIDE AND FORMOTEROL FUMARATE DIHYDRATE 2 PUFF: 160; 4.5 AEROSOL RESPIRATORY (INHALATION) at 19:49

## 2024-11-13 RX ADMIN — PANCRELIPASE 48000 UNITS: 120000; 24000; 76000 CAPSULE, DELAYED RELEASE PELLETS ORAL at 16:35

## 2024-11-13 RX ADMIN — PANCRELIPASE 48000 UNITS: 120000; 24000; 76000 CAPSULE, DELAYED RELEASE PELLETS ORAL at 08:13

## 2024-11-13 RX ADMIN — INSULIN GLARGINE 12 UNITS: 100 INJECTION, SOLUTION SUBCUTANEOUS at 08:15

## 2024-11-13 RX ADMIN — FAMOTIDINE 40 MG: 20 TABLET, FILM COATED ORAL at 21:53

## 2024-11-13 RX ADMIN — CEFAZOLIN SODIUM 2000 MG: 2 SOLUTION INTRAVENOUS at 14:03

## 2024-11-13 RX ADMIN — DIPHENHYDRAMINE HYDROCHLORIDE 25 MG: 50 INJECTION, SOLUTION INTRAMUSCULAR; INTRAVENOUS at 09:51

## 2024-11-13 RX ADMIN — POLYETHYLENE GLYCOL 3350 17 G: 17 POWDER, FOR SOLUTION ORAL at 11:11

## 2024-11-13 RX ADMIN — MAGNESIUM SULFATE HEPTAHYDRATE 2 G: 40 INJECTION, SOLUTION INTRAVENOUS at 05:59

## 2024-11-13 RX ADMIN — INSULIN LISPRO 4 UNITS: 100 INJECTION, SOLUTION INTRAVENOUS; SUBCUTANEOUS at 08:17

## 2024-11-13 RX ADMIN — APIXABAN 5 MG: 5 TABLET, FILM COATED ORAL at 08:14

## 2024-11-13 RX ADMIN — TACROLIMUS 2 MG: 0.5 CAPSULE ORAL at 08:14

## 2024-11-13 RX ADMIN — METOCLOPRAMIDE 5 MG: 5 INJECTION, SOLUTION INTRAMUSCULAR; INTRAVENOUS at 04:34

## 2024-11-13 RX ADMIN — APIXABAN 5 MG: 5 TABLET, FILM COATED ORAL at 17:57

## 2024-11-13 RX ADMIN — PREDNISONE 15 MG: 5 TABLET ORAL at 08:14

## 2024-11-13 RX ADMIN — OXYCODONE HYDROCHLORIDE 15 MG: 10 TABLET ORAL at 14:01

## 2024-11-13 RX ADMIN — INSULIN LISPRO 12 UNITS: 100 INJECTION, SOLUTION INTRAVENOUS; SUBCUTANEOUS at 16:33

## 2024-11-13 RX ADMIN — CEFAZOLIN SODIUM 2000 MG: 2 SOLUTION INTRAVENOUS at 04:33

## 2024-11-13 RX ADMIN — ACETAMINOPHEN 975 MG: 325 TABLET, FILM COATED ORAL at 04:34

## 2024-11-13 RX ADMIN — OXYCODONE HYDROCHLORIDE 15 MG: 10 TABLET ORAL at 11:09

## 2024-11-13 RX ADMIN — NYSTATIN 500000 UNITS: 100000 SUSPENSION ORAL at 23:03

## 2024-11-13 RX ADMIN — HYDROMORPHONE HYDROCHLORIDE 1 MG: 1 INJECTION, SOLUTION INTRAMUSCULAR; INTRAVENOUS; SUBCUTANEOUS at 04:02

## 2024-11-13 RX ADMIN — ALPRAZOLAM 1 MG: 0.5 TABLET ORAL at 00:43

## 2024-11-13 RX ADMIN — TACROLIMUS 2 MG: 0.5 CAPSULE ORAL at 17:46

## 2024-11-13 RX ADMIN — CYCLOBENZAPRINE HYDROCHLORIDE 5 MG: 10 TABLET, FILM COATED ORAL at 21:53

## 2024-11-13 RX ADMIN — METOCLOPRAMIDE 5 MG: 5 INJECTION, SOLUTION INTRAMUSCULAR; INTRAVENOUS at 11:09

## 2024-11-13 RX ADMIN — ACETAMINOPHEN 975 MG: 325 TABLET, FILM COATED ORAL at 21:53

## 2024-11-13 RX ADMIN — METOCLOPRAMIDE 5 MG: 5 INJECTION, SOLUTION INTRAMUSCULAR; INTRAVENOUS at 17:46

## 2024-11-13 RX ADMIN — PANCRELIPASE 48000 UNITS: 120000; 24000; 76000 CAPSULE, DELAYED RELEASE PELLETS ORAL at 12:06

## 2024-11-13 RX ADMIN — DICYCLOMINE HYDROCHLORIDE 20 MG: 10 CAPSULE ORAL at 04:33

## 2024-11-13 RX ADMIN — DIPHENHYDRAMINE HYDROCHLORIDE 25 MG: 50 INJECTION, SOLUTION INTRAMUSCULAR; INTRAVENOUS at 16:31

## 2024-11-13 RX ADMIN — SUCRALFATE 1 G: 1 TABLET ORAL at 11:09

## 2024-11-13 RX ADMIN — DICYCLOMINE HYDROCHLORIDE 20 MG: 10 CAPSULE ORAL at 15:11

## 2024-11-13 RX ADMIN — VALACYCLOVIR HYDROCHLORIDE 500 MG: 500 TABLET, FILM COATED ORAL at 08:14

## 2024-11-13 RX ADMIN — PANTOPRAZOLE SODIUM 40 MG: 40 TABLET, DELAYED RELEASE ORAL at 04:34

## 2024-11-13 RX ADMIN — METOCLOPRAMIDE 5 MG: 5 INJECTION, SOLUTION INTRAMUSCULAR; INTRAVENOUS at 23:03

## 2024-11-13 RX ADMIN — ACETAMINOPHEN 975 MG: 325 TABLET, FILM COATED ORAL at 13:59

## 2024-11-13 RX ADMIN — GUAIFENESIN 1200 MG: 600 TABLET ORAL at 11:11

## 2024-11-13 RX ADMIN — OXYCODONE HYDROCHLORIDE 20 MG: 10 TABLET ORAL at 01:31

## 2024-11-13 RX ADMIN — LIDOCAINE 1 PATCH: 50 PATCH CUTANEOUS at 04:02

## 2024-11-13 RX ADMIN — INSULIN LISPRO 10 UNITS: 100 INJECTION, SOLUTION INTRAVENOUS; SUBCUTANEOUS at 08:16

## 2024-11-13 RX ADMIN — SUCRALFATE 1 G: 1 TABLET ORAL at 04:34

## 2024-11-13 RX ADMIN — SUCRALFATE 1 G: 1 TABLET ORAL at 15:11

## 2024-11-13 RX ADMIN — PANTOPRAZOLE SODIUM 40 MG: 40 TABLET, DELAYED RELEASE ORAL at 15:11

## 2024-11-13 RX ADMIN — ATOVAQUONE 1500 MG: 750 SUSPENSION ORAL at 08:23

## 2024-11-13 RX ADMIN — ALPRAZOLAM 1 MG: 0.5 TABLET ORAL at 21:53

## 2024-11-13 RX ADMIN — DICYCLOMINE HYDROCHLORIDE 20 MG: 10 CAPSULE ORAL at 21:53

## 2024-11-13 RX ADMIN — BUDESONIDE AND FORMOTEROL FUMARATE DIHYDRATE 2 PUFF: 160; 4.5 AEROSOL RESPIRATORY (INHALATION) at 08:23

## 2024-11-13 RX ADMIN — CYCLOBENZAPRINE HYDROCHLORIDE 5 MG: 10 TABLET, FILM COATED ORAL at 04:02

## 2024-11-13 RX ADMIN — CEFAZOLIN SODIUM 2000 MG: 2 SOLUTION INTRAVENOUS at 21:54

## 2024-11-13 RX ADMIN — DICYCLOMINE HYDROCHLORIDE 20 MG: 10 CAPSULE ORAL at 11:08

## 2024-11-13 RX ADMIN — OXYCODONE HYDROCHLORIDE 20 MG: 10 TABLET ORAL at 08:15

## 2024-11-13 RX ADMIN — NYSTATIN 500000 UNITS: 100000 SUSPENSION ORAL at 17:48

## 2024-11-13 RX ADMIN — AZITHROMYCIN 250 MG: 250 TABLET, FILM COATED ORAL at 08:14

## 2024-11-13 RX ADMIN — CYCLOBENZAPRINE HYDROCHLORIDE 5 MG: 10 TABLET, FILM COATED ORAL at 15:11

## 2024-11-13 RX ADMIN — INSULIN LISPRO 3 UNITS: 100 INJECTION, SOLUTION INTRAVENOUS; SUBCUTANEOUS at 16:33

## 2024-11-13 RX ADMIN — OXYCODONE HYDROCHLORIDE 20 MG: 10 TABLET ORAL at 19:48

## 2024-11-13 RX ADMIN — GUAIFENESIN 1200 MG: 600 TABLET ORAL at 21:53

## 2024-11-13 NOTE — ASSESSMENT & PLAN NOTE
"Came in for facial swelling, this is resolved. History of cystic fibrosis s/p 2 previous lung transplant with chronic rejection on tacrolimus/prednisone. Patient reports she is \"end-stage\" at this point, and not a candidate for another transplant  Continue chronic Mepron/Zithromax/Valtrex  11/11 spoke to Frances from [facility] transplant team, low threshold to treat for superimposed infection  Procal 9.85 with cough productive of green sputum and ongoing epigastric/substernal pain  Started doxy, switched to cefazolin given GBS on sputum - awaiting final sensitivities  "

## 2024-11-13 NOTE — CASE MANAGEMENT
Case Management Discharge Planning Note    Patient name Maximilian Mann  Location /-01 MRN [id number]  : 1975 Date 2024       Current Admission Date: 11/10/2024  Current Admission Diagnosis:Cystic fibrosis (HCC)   Patient Active Problem List    Diagnosis Date Noted Date Diagnosed    Chest pain 2024     Pancytopenia, acquired ([facility]) 10/24/2024     History of pulmonary embolus (PE) 2024     History of DVT (deep vein thrombosis) 2024     Sore throat 2024     Vaginal yeast infection 2024     Intestinal metaplasia of antrum of stomach without dysplasia 2024     Hx of lung transplant ([facility]) 01/15/2024     Continuous opioid dependence ([facility]) 01/15/2024     Stage 3a chronic kidney disease ([facility]) 2023     Depressed mood 2023     Chronic pancreatitis, unspecified pancreatitis type ([facility]) 2023     Pulmonary embolus (HCC) 2022     Right leg pain 10/13/2022     Chronic cough 2022     Renal calculus, left 2022     Pulmonary nodules 2022     Rib pain 2022     Gastroparesis 2022     Ulcerative pancolitis without complication ([facility]) 2022     Neuropathic pain of right foot 01/10/2022     Weakness of right lower extremity 2021     Pulmonary hypertension (HCC) 2021     Cystic fibrosis ([facility]) 2021     Essential hypertension 2021     Hypercholesteremia 2021     Long term current use of bisphosphonates 2020     Long term current use of systemic steroids 2020     Osteopenia 2020     Chronic pain disorder 2020     Asthma 2020     Nausea and vomiting 2020     Hiatal hernia 2019     Anemia 2018     DVT (deep venous thrombosis) ([facility]) 2018     Immunosuppression ([facility]) 2017     Colon polyp, personal history 2016     S/P pneumonectomy 2013     Anxiety 2012     Type 1 diabetes mellitus with diabetic chronic kidney  disease, unspecified CKD stage (HCC) 10/28/2010     Pancreatic insufficiency 10/28/2010     Chronic sinusitis 10/28/2010     GERD (gastroesophageal reflux disease) 10/28/2010       LOS (days): 1  Geometric Mean LOS (GMLOS) (days): 1.8  Days to GMLOS:0.7     OBJECTIVE:  Risk of Unplanned Readmission Score: 44.9         Current admission status: Inpatient   Preferred Pharmacy:   [facility]  [place], [place] [address] [place]  [address]  [place] [address]  Phone: [phone] Fax: [phone]    [facility]/pharm[facility] #[id number] - [place] - [address]  [address]  [place] [address]  Phone: [phone] Fax: [phone]    [facility]/pharmacy #[id number] - [place] [address]  [address]  [place] [address]  Phone: [phone] Fax: [phone]    Primary Care Provider: Solis Avila MD    Primary Insurance: BLUE CROSS  Secondary Insurance: MEDICARE    DISCHARGE DETAILS:  As per SLIM rounds, patient receiving IV Abx for acute lung infx. Anticipated discharge 24 hrs. No CM needs anticipated.

## 2024-11-13 NOTE — QUICK NOTE
Notified by RN regarding severe pain in upper abdomen. At bedside, the patient is crying and reports severe pain in LUQ that radiates to her back, states pain is 8-9/10. Patient also states she has felt this pain before, she is hemodynamically stable, no nausea, vomiting, diaphoresis or any other symptoms than the stated above . Abdomen is soft, non-tender. Dilaudid 1 mg, flexeril one time dose, lidocaine patch , aqua k pack and other non-pharmacological measures were ordered. Per RN, intervention was effective. Flexeril 5 mg PRN was added to plan of care.

## 2024-11-13 NOTE — ASSESSMENT & PLAN NOTE
History of recurrent idiopathic angioedema presented to ED with sudden onset facial/neck swelling when she woke up 11/10/24.    Workup for this unremarkable, angioedema resolved with treatment

## 2024-11-13 NOTE — PLAN OF CARE
Problem: Potential for Falls  Goal: Patient will remain free of falls  Description: INTERVENTIONS:  - Educate patient/family on patient safety including physical limitations  - Instruct patient to call for assistance with activity   - Consult OT/PT to assist with strengthening/mobility   - Keep Call bell within reach  - Keep bed low and locked with side rails adjusted as appropriate  - Keep care items and personal belongings within reach  - Initiate and maintain comfort rounds  - Make Fall Risk Sign visible to staff  - Offer Toileting every  Hours, in advance of need  - Initiate/Maintain alarm  - Obtain necessary fall risk management equipment:   - Apply yellow socks and bracelet for high fall risk patients  - Consider moving patient to room near nurses station  Outcome: Progressing     Problem: PAIN - ADULT  Goal: Verbalizes/displays adequate comfort level or baseline comfort level  Description: Interventions:  - Encourage patient to monitor pain and request assistance  - Assess pain using appropriate pain scale  - Administer analgesics based on type and severity of pain and evaluate response  - Implement non-pharmacological measures as appropriate and evaluate response  - Consider cultural and social influences on pain and pain management  - Notify physician/advanced practitioner if interventions unsuccessful or patient reports new pain  Outcome: Progressing     Problem: INFECTION - ADULT  Goal: Absence or prevention of progression during hospitalization  Description: INTERVENTIONS:  - Assess and monitor for signs and symptoms of infection  - Monitor lab/diagnostic results  - Monitor all insertion sites, i.e. indwelling lines, tubes, and drains  - Monitor endotracheal if appropriate and nasal secretions for changes in amount and color  - [place] appropriate cooling/warming therapies per order  - Administer medications as ordered  - Instruct and encourage patient and family to use good hand hygiene technique  -  Identify and instruct in appropriate isolation precautions for identified infection/condition  Outcome: Progressing  Goal: Absence of fever/infection during neutropenic period  Description: INTERVENTIONS:  - Monitor WBC    Outcome: Progressing     Problem: SAFETY ADULT  Goal: Patient will remain free of falls  Description: INTERVENTIONS:  - Educate patient/family on patient safety including physical limitations  - Instruct patient to call for assistance with activity   - Consult OT/PT to assist with strengthening/mobility   - Keep Call bell within reach  - Keep bed low and locked with side rails adjusted as appropriate  - Keep care items and personal belongings within reach  - Initiate and maintain comfort rounds  - Make Fall Risk Sign visible to staff  - Offer Toileting every  Hours, in advance of need  - Initiate/Maintain alarm  - Obtain necessary fall risk management equipment:   - Apply yellow socks and bracelet for high fall risk patients  - Consider moving patient to room near nurses station  Outcome: Progressing  Goal: Maintain or return to baseline ADL function  Description: INTERVENTIONS:  -  Assess patient's ability to carry out ADLs; assess patient's baseline for ADL function and identify physical deficits which impact ability to perform ADLs (bathing, care of mouth/teeth, toileting, grooming, dressing, etc.)  - Assess/evaluate cause of self-care deficits   - Assess range of motion  - Assess patient's mobility; develop plan if impaired  - Assess patient's need for assistive devices and provide as appropriate  - Encourage maximum independence but intervene and supervise when necessary  - Involve family in performance of ADLs  - Assess for home care needs following discharge   - Consider OT consult to assist with ADL evaluation and planning for discharge  - Provide patient education as appropriate  Outcome: Progressing  Goal: Maintains/Returns to pre admission functional level  Description:  INTERVENTIONS:  - Perform AM-PAC 6 Click Basic Mobility/ Daily Activity assessment daily.  - Set and communicate daily mobility goal to care team and patient/family/caregiver.   - Collaborate with rehabilitation services on mobility goals if consulted  - Perform Range of Motion  times a day.  - Reposition patient every  hours.  - Dangle patient  times a day  - Stand patient  times a day  - Ambulate patient  times a day  - Out of bed to chair  times a day   - Out of bed for meals times a day  - Out of bed for toileting  - Record patient progress and toleration of activity level   Outcome: Progressing     Problem: RESPIRATORY - ADULT  Goal: Achieves optimal ventilation and oxygenation  Description: INTERVENTIONS:  - Assess for changes in respiratory status  - Assess for changes in mentation and behavior  - Position to facilitate oxygenation and minimize respiratory effort  - Oxygen administered by appropriate delivery if ordered  - Initiate smoking cessation education as indicated  - Encourage broncho-pulmonary hygiene including cough, deep breathe, Incentive Spirometry  - Assess the need for suctioning and aspirate as needed  - Assess and instruct to report SOB or any respiratory difficulty  - Respiratory Therapy support as indicated  Outcome: Progressing

## 2024-11-13 NOTE — APP STUDENT NOTE
"Progress Note: 11/13/2024     Subjective:  CC: \"Facial Swelling\" since waking up, 11/10/2024     HPI:  47 y/o Female presents today with PMH of progressive \"end stage\" cystic fibrosis and s/p lung transplant with chronic rejection maintained on tacrolimus/prednisone. Since being admitted from the ED on 11/10 her facial swelling has resolved. She currently presents with epigastric pain and substernal heaviness since 11/11.      11/13/12024  Patient appears to have some improvement since yesterday. Late last night/early am, she experienced a pain exacerbation for which she was given Dilaudid 1 mg, flexeril one time dose, lidocaine patch, and aqua K patch of which seemed to provide some relief. This morning she is currently fatigued and is experiencing some puffiness of the soft tissue around the L eye. She requested Benadryl. Patient states that she is overall feeling better than the night before and would like to get a good day of rest. She is hemodynamically stable, hydrating well, and still has an appetite.     Objective:    Vitals and nurse notes were reviewed.  Temp:  [97.6 °F (36.4 °C)-98.8 °F (37.1 °C)] 98.5 °F (36.9 °C)  HR:  [67-85] 69  BP: (116-158)/(69-92) 116/69  Resp:  [18-20] 18  SpO2:  [93 %-96 %] 93 %  O2 Device: None (Room air)     Body mass index is 21.03 kg/m².      Input and Output Summary (last 24 hours):      Intake/Output Summary (Last 24 hours) at 11/13/2024 0743  Last data filed at 11/12/2024 1806      Gross per 24 hour   Intake 462 ml   Output --   Net 462 ml     Physical Exam:  Constitutional: Patient appears only in mild distress. She is awake and alert, but fatigued and laying in bed.   CV:  RRR.  Pulmonary: Patient appeared in mild respiratory distress and some shortness of breath. Pain with respirations has decreased since yesterday. Rhonchi and some rales noted on L lung. Currently SpO2 93% on room air, no nasal canula in use.  Skin: Mild pale discoloration to the skin. Clubbing noted " of the nails. Erythema and mild edema appreciated of the soft tissue surrounding the left eye. No edema noted in the lower extremities.     Lab work:  Lab results were reviewed.  Results from last 7 days   Lab Units 11/13/24  0355   WBC Thousand/uL 5.04   HEMOGLOBIN g/dL 8.5*   HEMATOCRIT % 30.5*   PLATELETS Thousands/uL 127*   SEGS PCT % 62   LYMPHO PCT % 25   MONO PCT % 12   EOS PCT % 1            Results from last 7 days   Lab Units 11/13/24  0355 11/12/24  0520   SODIUM mmol/L 135 134*   POTASSIUM mmol/L 5.2 5.1   CHLORIDE mmol/L 102 101   CO2 mmol/L 27 29   BUN mg/dL 20 21   CREATININE mg/dL 1.02 0.93   ANION GAP mmol/L 6 4   CALCIUM mg/dL 8.0* 8.0*   ALBUMIN g/dL  --  2.9*   TOTAL BILIRUBIN mg/dL  --  0.27   ALK PHOS U/L  --  63   ALT U/L  --  19   AST U/L  --  14   GLUCOSE RANDOM mg/dL 294* 281*                        Results from last 7 days   Lab Units 11/13/24  0642 11/12/24  1636 11/12/24  1632 11/12/24  1036 11/12/24  0624 11/11/24  1638 11/11/24  1048 11/11/24  0606 11/10/24  2122 11/10/24  1938   POC GLUCOSE mg/dl 267* 76 49* 107 275* 92 150* 348* 162* 96                Results from last 7 days   Lab Units 11/13/24  0355 11/12/24  0520   PROCALCITONIN ng/ml 4.33* 9.85*         Recent Cultures (last 7 days):        Results from last 7 days   Lab Units 11/11/24  1205   SPUTUM CULTURE   Culture results to follow.  4+ Growth of Beta Hemolytic Streptococcus Group B*   GRAM STAIN RESULT   1+ Epithelial cells per low power field*  3+ Gram positive cocci in pairs*  No polys seen*         Imaging Results Review: No pertinent imaging studies reviewed.  Other Study Results Review: No additional pertinent studies reviewed.    Assessment & Plan:    Angioedema:  - patient has Hx of recurrent idiopathic angioedema  - this issue has resolved since admission on 11/10/24  - continue monitoring for any changes or notable swelling  - administer Benadryl as needed  - continue oral prednisone 15 mg daily as per home  dose  Cystic Fibrosis (HCC):  - continue chronic Mepron/Zithromax/Valtrex  T1DM with diabetic CKD:  - Lantus 12 unts nightly ordered  - consistent carb diet  - hypoglycemia protocol  - Random Blood Glucose 11/12 0642 was elevated at 267 ng/ml  Chronic Pain Disorder:  - maintained chronically on oral oxycodone 10 mg every 4 hours as needed.  - continue monitoring for exacerbation flare ups  - was provided Dilaudid 1 mg, flexeril one time dose, lidocaine patch, and aqua K patch for flare up last night  GERD:  - continue Pepcid, add pantoprazole and Carafate due to esophagitis on imaging. Can trial Bentyl as well    Discharge Plan: anticipated discharge within 24-48 hours to home with     MELISA Garzon 11/13/2024

## 2024-11-13 NOTE — PLAN OF CARE
Problem: Potential for Falls  Goal: Patient will remain free of falls  Description: INTERVENTIONS:  - Educate patient/family on patient safety including physical limitations  - Instruct patient to call for assistance with activity   - Consult OT/PT to assist with strengthening/mobility   - Keep Call bell within reach  - Keep bed low and locked with side rails adjusted as appropriate  - Keep care items and personal belongings within reach  - Initiate and maintain comfort rounds  - Make Fall Risk Sign visible to staff  - Offer Toileting every  Hours, in advance of need  - Initiate/Maintain alarm  - Obtain necessary fall risk management equipment:   - Apply yellow socks and bracelet for high fall risk patients  - Consider moving patient to room near nurses station  Outcome: Progressing     Problem: PAIN - ADULT  Goal: Verbalizes/displays adequate comfort level or baseline comfort level  Description: Interventions:  - Encourage patient to monitor pain and request assistance  - Assess pain using appropriate pain scale  - Administer analgesics based on type and severity of pain and evaluate response  - Implement non-pharmacological measures as appropriate and evaluate response  - Consider cultural and social influences on pain and pain management  - Notify physician/advanced practitioner if interventions unsuccessful or patient reports new pain  Outcome: Progressing     Problem: INFECTION - ADULT  Goal: Absence or prevention of progression during hospitalization  Description: INTERVENTIONS:  - Assess and monitor for signs and symptoms of infection  - Monitor lab/diagnostic results  - Monitor all insertion sites, i.e. indwelling lines, tubes, and drains  - Monitor endotracheal if appropriate and nasal secretions for changes in amount and color  - [place] appropriate cooling/warming therapies per order  - Administer medications as ordered  - Instruct and encourage patient and family to use good hand hygiene technique  -  Identify and instruct in appropriate isolation precautions for identified infection/condition  Outcome: Progressing  Goal: Absence of fever/infection during neutropenic period  Description: INTERVENTIONS:  - Monitor WBC    Outcome: Progressing     Problem: SAFETY ADULT  Goal: Patient will remain free of falls  Description: INTERVENTIONS:  - Educate patient/family on patient safety including physical limitations  - Instruct patient to call for assistance with activity   - Consult OT/PT to assist with strengthening/mobility   - Keep Call bell within reach  - Keep bed low and locked with side rails adjusted as appropriate  - Keep care items and personal belongings within reach  - Initiate and maintain comfort rounds  - Make Fall Risk Sign visible to staff  - Offer Toileting every  Hours, in advance of need  - Initiate/Maintain alarm  - Obtain necessary fall risk management equipment:   - Apply yellow socks and bracelet for high fall risk patients  - Consider moving patient to room near nurses station  Outcome: Progressing  Goal: Maintain or return to baseline ADL function  Description: INTERVENTIONS:  -  Assess patient's ability to carry out ADLs; assess patient's baseline for ADL function and identify physical deficits which impact ability to perform ADLs (bathing, care of mouth/teeth, toileting, grooming, dressing, etc.)  - Assess/evaluate cause of self-care deficits   - Assess range of motion  - Assess patient's mobility; develop plan if impaired  - Assess patient's need for assistive devices and provide as appropriate  - Encourage maximum independence but intervene and supervise when necessary  - Involve family in performance of ADLs  - Assess for home care needs following discharge   - Consider OT consult to assist with ADL evaluation and planning for discharge  - Provide patient education as appropriate  Outcome: Progressing  Goal: Maintains/Returns to pre admission functional level  Description:  INTERVENTIONS:  - Perform AM-PAC 6 Click Basic Mobility/ Daily Activity assessment daily.  - Set and communicate daily mobility goal to care team and patient/family/caregiver.   - Collaborate with rehabilitation services on mobility goals if consulted  - Perform Range of Motion  times a day.  - Reposition patient every  hours.  - Dangle patient  times a day  - Stand patient  times a day  - Ambulate patient  times a day  - Out of bed to chair times a day   - Out of bed for meals  times a day  - Out of bed for toileting  - Record patient progress and toleration of activity level   Outcome: Progressing     Problem: RESPIRATORY - ADULT  Goal: Achieves optimal ventilation and oxygenation  Description: INTERVENTIONS:  - Assess for changes in respiratory status  - Assess for changes in mentation and behavior  - Position to facilitate oxygenation and minimize respiratory effort  - Oxygen administered by appropriate delivery if ordered  - Initiate smoking cessation education as indicated  - Encourage broncho-pulmonary hygiene including cough, deep breathe, Incentive Spirometry  - Assess the need for suctioning and aspirate as needed  - Assess and instruct to report SOB or any respiratory difficulty  - Respiratory Therapy support as indicated  Outcome: Progressing

## 2024-11-13 NOTE — PROGRESS NOTES
"Progress Note - Hospitalist   Name: Maximilian Mann 48 y.o. female I MRN: [id number]  Unit/Bed#: -01 I Date of Admission: 11/10/2024   Date of Service: 11/13/2024 I Hospital Day: 1    Assessment & Plan  Cystic fibrosis ([facility])  Came in for facial swelling, this is resolved. History of cystic fibrosis s/p 2 previous lung transplant with chronic rejection on tacrolimus/prednisone. Patient reports she is \"end-stage\" at this point, and not a candidate for another transplant  Continue chronic Mepron/Zithromax/Valtrex  11/11 spoke to Frances from [facility] transplant team, low threshold to treat for superimposed infection  Procal 9.85 with cough productive of green sputum and ongoing epigastric/substernal pain  Started doxy, switched to cefazolin given GBS on sputum - awaiting final sensitivities  Angioedema (Resolved: 11/13/2024)  History of recurrent idiopathic angioedema presented to ED with sudden onset facial/neck swelling when she woke up 11/10/24.    Workup for this unremarkable, angioedema resolved with treatment  Hx of lung transplant ([facility])  Transplant x2, now with only left functioning lung and chronic rejection on tacrolimus/prednisone.  History of pulmonary embolus (PE)  Continue Eliquis.  Type 1 diabetes mellitus with diabetic chronic kidney disease, unspecified CKD stage ([facility])  Blood Sugar Average: Last 72 hrs:(P) 162.2  Lantus 12 units nightly ordered  Sliding scale coverage with ACHS checks  Consistent carb diet  Hypoglycemia protocol  Chronic pain disorder  Maintained chronically on oral oxycodone 10 mg every 4 hours as needed.  GERD (gastroesophageal reflux disease)  Continue Pepcid, add pantoprazole and carafate due to esophagitis on imaging. Can trial bentyl as well  Is due to EGD as well as Bravo pH test    VTE Pharmacologic Prophylaxis: VTE Score: 1 Low Risk (Score 0-2) - Encourage Ambulation.    Mobility:   Basic Mobility Inpatient Raw Score: 24  JH-HLM Goal: 8: Walk 250 feet or more  JH-HLM " Achieved: 7: Walk 25 feet or more  JH-HLM Goal NOT achieved. Continue with multidisciplinary rounding and encourage appropriate mobility to improve upon JH-HLM goals.    Patient Centered Rounds: I performed bedside rounds with nursing staff today.   Discussions with Specialists or Other Care Team Provider: WESLY    Education and Discussions with Family / Patient: Patient declined call to .     Current Length of Stay: 1 day(s)  Current Patient Status: Inpatient   Certification Statement: The patient will continue to require additional inpatient hospital stay due to CF with probable pneumonia  Discharge Plan: Anticipate discharge in 24-48 hrs to home.    Code Status: Level 1 - Full Code    Subjective   Patient doing OK today. No fevers overnight. Cough still bad, she is having trouble clearing sputum. Still having pain and not ready for chest physio. Has not yet started using the flutter valve. Discussed sputum culture GBS, she did have a sore throat.     Objective :  Temp:  [97.6 °F (36.4 °C)-98.8 °F (37.1 °C)] 98.5 °F (36.9 °C)  HR:  [67-85] 69  BP: (116-158)/(69-92) 116/69  Resp:  [18-20] 18  SpO2:  [93 %-96 %] 93 %  O2 Device: None (Room air)    Body mass index is 21.03 kg/m².     Input and Output Summary (last 24 hours):     Intake/Output Summary (Last 24 hours) at 11/13/2024 0743  Last data filed at 11/12/2024 1806  Gross per 24 hour   Intake 462 ml   Output --   Net 462 ml       Physical Exam  Vitals and nursing note reviewed.   Constitutional:       General: She is awake. She is not in acute distress.     Appearance: Normal appearance. She is well-developed and well-groomed. She is not ill-appearing or toxic-appearing.   HENT:      Head: Normocephalic.   Cardiovascular:      Rate and Rhythm: Normal rate.   Pulmonary:      Effort: Pulmonary effort is normal. No respiratory distress.      Breath sounds: Wheezing (scant) and rhonchi present. No rales.   Skin:     General: Skin is warm and dry.       Coloration: Skin is not pale.   Neurological:      Mental Status: She is alert and oriented to person, place, and time.   Psychiatric:         Mood and Affect: Mood normal.         Behavior: Behavior normal. Behavior is cooperative.           Lines/Drains:  Lines/Drains/Airways       Active Status       Name Placement date Placement time Site Days    Port A Cath 11/25/22 Right Chest 11/25/22  0953  Chest  718                    Central Line:  Goal for removal:  PORT present                Lab Results: I have reviewed the following results:   Results from last 7 days   Lab Units 11/13/24  0355   WBC Thousand/uL 5.04   HEMOGLOBIN g/dL 8.5*   HEMATOCRIT % 30.5*   PLATELETS Thousands/uL 127*   SEGS PCT % 62   LYMPHO PCT % 25   MONO PCT % 12   EOS PCT % 1     Results from last 7 days   Lab Units 11/13/24  0355 11/12/24  0520   SODIUM mmol/L 135 134*   POTASSIUM mmol/L 5.2 5.1   CHLORIDE mmol/L 102 101   CO2 mmol/L 27 29   BUN mg/dL 20 21   CREATININE mg/dL 1.02 0.93   ANION GAP mmol/L 6 4   CALCIUM mg/dL 8.0* 8.0*   ALBUMIN g/dL  --  2.9*   TOTAL BILIRUBIN mg/dL  --  0.27   ALK PHOS U/L  --  63   ALT U/L  --  19   AST U/L  --  14   GLUCOSE RANDOM mg/dL 294* 281*         Results from last 7 days   Lab Units 11/13/24  0642 11/12/24  1636 11/12/24  1632 11/12/24  1036 11/12/24  0624 11/11/24  1638 11/11/24  1048 11/11/24  0606 11/10/24  2122 11/10/24  1938   POC GLUCOSE mg/dl 267* 76 49* 107 275* 92 150* 348* 162* 96         Results from last 7 days   Lab Units 11/13/24  0355 11/12/24  0520   PROCALCITONIN ng/ml 4.33* 9.85*       Recent Cultures (last 7 days):   Results from last 7 days   Lab Units 11/11/24  1205   SPUTUM CULTURE  Culture results to follow.  4+ Growth of Beta Hemolytic Streptococcus Group B*   GRAM STAIN RESULT  1+ Epithelial cells per low power field*  3+ Gram positive cocci in pairs*  No polys seen*       Imaging Results Review: No pertinent imaging studies reviewed.  Other Study Results Review: No  additional pertinent studies reviewed.    Last 24 Hours Medication List:     Current Facility-Administered Medications:     acetaminophen (TYLENOL) tablet 650 mg, Q6H PRN    acetaminophen (TYLENOL) tablet 975 mg, Q8H DOTTY    albuterol inhalation solution 5 mg, Q4H PRN    ALPRAZolam (XANAX) tablet 1 mg, HS PRN    apixaban (ELIQUIS) tablet 5 mg, BID    atovaquone (MEPRON) oral suspension 1,500 mg, Daily    azithromycin (ZITHROMAX) tablet 250 mg, Once per day on Monday Wednesday Friday    budesonide-formoterol (SYMBICORT) 160-4.5 mcg/act inhaler 2 puff, Q12H    calcium carbonate (TUMS) chewable tablet 500 mg, Daily PRN    ceFAZolin (ANCEF) IVPB (premix in dextrose) 2,000 mg 50 mL, Q8H, Last Rate: 2,000 mg (11/13/24 1664)    cyclobenzaprine (FLEXERIL) tablet 5 mg, TID PRN    dicyclomine (BENTYL) capsule 20 mg, 4x Daily (AC & HS)    diphenhydrAMINE (BENADRYL) injection 25 mg, Q6H PRN    famotidine (PEPCID) tablet 40 mg, HS    HYDROmorphone (DILAUDID) injection 0.5 mg, Q4H PRN    insulin glargine (LANTUS) subcutaneous injection 12 Units 0.12 mL, QAM    insulin lispro (HumALOG/ADMELOG) 100 units/mL subcutaneous injection 10 Units, Daily With Breakfast    insulin lispro (HumALOG/ADMELOG) 100 units/mL subcutaneous injection 12 Units, Daily With Dinner    insulin lispro (HumALOG/ADMELOG) 100 units/mL subcutaneous injection 2-7 Units, TID AC **AND** Fingerstick Glucose (POCT), TID AC    insulin lispro (HumALOG/ADMELOG) 100 units/mL subcutaneous injection 8 Units, Daily With Lunch    lidocaine (LIDODERM) 5 % patch 1 patch, Daily    lidocaine (LIDODERM) 5 % patch 1 patch, Once    magnesium sulfate 2 g/50 mL IVPB (premix) 2 g, Once, Last Rate: 2 g (11/13/24 8825)    metoclopramide (REGLAN) injection 5 mg, Q6H DOTTY    naloxone (NARCAN) 0.04 mg/mL syringe 0.04 mg, Q1MIN PRN    oxyCODONE (ROXICODONE) IR tablet 15 mg, Q4H PRN **AND** oxyCODONE (ROXICODONE) immediate release tablet 20 mg, Q4H PRN    pancrelipase (Lip-Prot-Amyl) (CREON)  delayed release capsule 24,000 Units, TID PRN    pancrelipase (Lip-Prot-Amyl) (CREON) delayed release capsule 48,000 Units, TID With Meals    pantoprazole (PROTONIX) EC tablet 40 mg, BID AC    polyethylene glycol (MIRALAX) packet 17 g, Daily PRN    predniSONE tablet 15 mg, Daily    pregabalin (LYRICA) capsule 150 mg, BID    sucralfate (CARAFATE) tablet 1 g, TID AC    tacrolimus (PROGRAF) capsule 2 mg, BID    valACYclovir (VALTREX) tablet 500 mg, Daily    Administrative Statements   Today, Patient Was Seen By: Yolanda Ferrara PA-C  I have spent a total time of 50 minutes in caring for this patient on the day of the visit/encounter including Counseling / Coordination of care, Documenting in the medical record, Reviewing / ordering tests, medicine, procedures  , and Communicating with other healthcare professionals .    **Please Note: This note may have been constructed using a voice recognition system.**

## 2024-11-13 NOTE — ASSESSMENT & PLAN NOTE
Transplant x2, now with only left functioning lung and chronic rejection on tacrolimus/prednisone.

## 2024-11-13 NOTE — ASSESSMENT & PLAN NOTE
Blood Sugar Average: Last 72 hrs:(P) 162.2  Lantus 12 units nightly ordered  Sliding scale coverage with ACHS checks  Consistent carb diet  Hypoglycemia protocol

## 2024-11-14 LAB
BACTERIA SPT RESP CULT: ABNORMAL
GLUCOSE SERPL-MCNC: 106 MG/DL (ref 65–140)
GLUCOSE SERPL-MCNC: 139 MG/DL (ref 65–140)
GLUCOSE SERPL-MCNC: 140 MG/DL (ref 65–140)
GLUCOSE SERPL-MCNC: 208 MG/DL (ref 65–140)
GRAM STN SPEC: ABNORMAL

## 2024-11-14 PROCEDURE — 99232 SBSQ HOSP IP/OBS MODERATE 35: CPT | Performed by: NURSE PRACTITIONER

## 2024-11-14 PROCEDURE — 82948 REAGENT STRIP/BLOOD GLUCOSE: CPT

## 2024-11-14 RX ADMIN — SUCRALFATE 1 G: 1 TABLET ORAL at 12:23

## 2024-11-14 RX ADMIN — TACROLIMUS 2 MG: 0.5 CAPSULE ORAL at 19:13

## 2024-11-14 RX ADMIN — PANCRELIPASE 48000 UNITS: 120000; 24000; 76000 CAPSULE, DELAYED RELEASE PELLETS ORAL at 11:31

## 2024-11-14 RX ADMIN — ACETAMINOPHEN 975 MG: 325 TABLET, FILM COATED ORAL at 14:13

## 2024-11-14 RX ADMIN — VALACYCLOVIR HYDROCHLORIDE 500 MG: 500 TABLET, FILM COATED ORAL at 08:48

## 2024-11-14 RX ADMIN — METOCLOPRAMIDE 5 MG: 5 INJECTION, SOLUTION INTRAMUSCULAR; INTRAVENOUS at 11:30

## 2024-11-14 RX ADMIN — FAMOTIDINE 40 MG: 20 TABLET, FILM COATED ORAL at 21:53

## 2024-11-14 RX ADMIN — BUDESONIDE AND FORMOTEROL FUMARATE DIHYDRATE 2 PUFF: 160; 4.5 AEROSOL RESPIRATORY (INHALATION) at 20:02

## 2024-11-14 RX ADMIN — ACETAMINOPHEN 975 MG: 325 TABLET, FILM COATED ORAL at 21:53

## 2024-11-14 RX ADMIN — NYSTATIN 500000 UNITS: 100000 SUSPENSION ORAL at 21:53

## 2024-11-14 RX ADMIN — DIPHENHYDRAMINE HYDROCHLORIDE 25 MG: 50 INJECTION, SOLUTION INTRAMUSCULAR; INTRAVENOUS at 14:13

## 2024-11-14 RX ADMIN — METOCLOPRAMIDE 5 MG: 5 INJECTION, SOLUTION INTRAMUSCULAR; INTRAVENOUS at 06:24

## 2024-11-14 RX ADMIN — DICYCLOMINE HYDROCHLORIDE 20 MG: 10 CAPSULE ORAL at 16:41

## 2024-11-14 RX ADMIN — CEFAZOLIN SODIUM 2000 MG: 2 SOLUTION INTRAVENOUS at 06:23

## 2024-11-14 RX ADMIN — APIXABAN 5 MG: 5 TABLET, FILM COATED ORAL at 08:48

## 2024-11-14 RX ADMIN — OXYCODONE HYDROCHLORIDE 20 MG: 10 TABLET ORAL at 07:44

## 2024-11-14 RX ADMIN — CEFAZOLIN SODIUM 2000 MG: 2 SOLUTION INTRAVENOUS at 14:13

## 2024-11-14 RX ADMIN — CYCLOBENZAPRINE HYDROCHLORIDE 5 MG: 10 TABLET, FILM COATED ORAL at 16:40

## 2024-11-14 RX ADMIN — INSULIN LISPRO 10 UNITS: 100 INJECTION, SOLUTION INTRAVENOUS; SUBCUTANEOUS at 07:45

## 2024-11-14 RX ADMIN — SUCRALFATE 1 G: 1 TABLET ORAL at 16:39

## 2024-11-14 RX ADMIN — NYSTATIN 500000 UNITS: 100000 SUSPENSION ORAL at 08:50

## 2024-11-14 RX ADMIN — NYSTATIN 500000 UNITS: 100000 SUSPENSION ORAL at 19:14

## 2024-11-14 RX ADMIN — INSULIN LISPRO 6 UNITS: 100 INJECTION, SOLUTION INTRAVENOUS; SUBCUTANEOUS at 16:39

## 2024-11-14 RX ADMIN — PANTOPRAZOLE SODIUM 40 MG: 40 TABLET, DELAYED RELEASE ORAL at 06:23

## 2024-11-14 RX ADMIN — GUAIFENESIN 1200 MG: 600 TABLET ORAL at 21:53

## 2024-11-14 RX ADMIN — PANCRELIPASE 48000 UNITS: 120000; 24000; 76000 CAPSULE, DELAYED RELEASE PELLETS ORAL at 16:40

## 2024-11-14 RX ADMIN — DICYCLOMINE HYDROCHLORIDE 20 MG: 10 CAPSULE ORAL at 10:41

## 2024-11-14 RX ADMIN — DICYCLOMINE HYDROCHLORIDE 20 MG: 10 CAPSULE ORAL at 06:23

## 2024-11-14 RX ADMIN — PREGABALIN 150 MG: 75 CAPSULE ORAL at 08:49

## 2024-11-14 RX ADMIN — DIPHENHYDRAMINE HYDROCHLORIDE 25 MG: 50 INJECTION, SOLUTION INTRAMUSCULAR; INTRAVENOUS at 06:30

## 2024-11-14 RX ADMIN — PANTOPRAZOLE SODIUM 40 MG: 40 TABLET, DELAYED RELEASE ORAL at 16:40

## 2024-11-14 RX ADMIN — INSULIN LISPRO 8 UNITS: 100 INJECTION, SOLUTION INTRAVENOUS; SUBCUTANEOUS at 11:32

## 2024-11-14 RX ADMIN — CYCLOBENZAPRINE HYDROCHLORIDE 5 MG: 10 TABLET, FILM COATED ORAL at 11:29

## 2024-11-14 RX ADMIN — PREGABALIN 150 MG: 75 CAPSULE ORAL at 19:13

## 2024-11-14 RX ADMIN — INSULIN GLARGINE 12 UNITS: 100 INJECTION, SOLUTION SUBCUTANEOUS at 08:49

## 2024-11-14 RX ADMIN — DIPHENHYDRAMINE HYDROCHLORIDE 25 MG: 50 INJECTION, SOLUTION INTRAMUSCULAR; INTRAVENOUS at 23:58

## 2024-11-14 RX ADMIN — BUDESONIDE AND FORMOTEROL FUMARATE DIHYDRATE 2 PUFF: 160; 4.5 AEROSOL RESPIRATORY (INHALATION) at 10:02

## 2024-11-14 RX ADMIN — TACROLIMUS 2 MG: 0.5 CAPSULE ORAL at 08:49

## 2024-11-14 RX ADMIN — METOCLOPRAMIDE 5 MG: 5 INJECTION, SOLUTION INTRAMUSCULAR; INTRAVENOUS at 19:13

## 2024-11-14 RX ADMIN — OXYCODONE HYDROCHLORIDE 20 MG: 10 TABLET ORAL at 20:02

## 2024-11-14 RX ADMIN — DICYCLOMINE HYDROCHLORIDE 20 MG: 10 CAPSULE ORAL at 21:53

## 2024-11-14 RX ADMIN — ACETAMINOPHEN 975 MG: 325 TABLET, FILM COATED ORAL at 06:23

## 2024-11-14 RX ADMIN — NYSTATIN 500000 UNITS: 100000 SUSPENSION ORAL at 12:23

## 2024-11-14 RX ADMIN — OXYCODONE HYDROCHLORIDE 15 MG: 10 TABLET ORAL at 10:40

## 2024-11-14 RX ADMIN — SUCRALFATE 1 G: 1 TABLET ORAL at 06:23

## 2024-11-14 RX ADMIN — CEFAZOLIN SODIUM 2000 MG: 2 SOLUTION INTRAVENOUS at 21:53

## 2024-11-14 RX ADMIN — OXYCODONE HYDROCHLORIDE 20 MG: 10 TABLET ORAL at 14:34

## 2024-11-14 RX ADMIN — GUAIFENESIN 1200 MG: 600 TABLET ORAL at 08:49

## 2024-11-14 RX ADMIN — PANCRELIPASE 48000 UNITS: 120000; 24000; 76000 CAPSULE, DELAYED RELEASE PELLETS ORAL at 07:45

## 2024-11-14 RX ADMIN — PREDNISONE 15 MG: 5 TABLET ORAL at 08:48

## 2024-11-14 RX ADMIN — APIXABAN 5 MG: 5 TABLET, FILM COATED ORAL at 19:12

## 2024-11-14 RX ADMIN — ATOVAQUONE 1500 MG: 750 SUSPENSION ORAL at 08:50

## 2024-11-14 RX ADMIN — CYCLOBENZAPRINE HYDROCHLORIDE 5 MG: 10 TABLET, FILM COATED ORAL at 21:54

## 2024-11-14 NOTE — ASSESSMENT & PLAN NOTE
Maintained chronically on oral oxycodone 10 mg every 4 hours as needed.  Increased to 15 mg or 20 mg for moderate to severe pain while hospitalized  IV Dilaudid for breakthrough  Aqua K  Patient appears very comfortable but is still requesting additional pain medication for costochondritis like symptoms education and emotional support provided  Avoid increase in pain medication if at all possible

## 2024-11-14 NOTE — PLAN OF CARE
Problem: Potential for Falls  Goal: Patient will remain free of falls  Description: INTERVENTIONS:  - Educate patient/family on patient safety including physical limitations  - Instruct patient to call for assistance with activity   - Consult OT/PT to assist with strengthening/mobility   - Keep Call bell within reach  - Keep bed low and locked with side rails adjusted as appropriate  - Keep care items and personal belongings within reach  - Initiate and maintain comfort rounds  - Make Fall Risk Sign visible to staff  - Offer Toileting every 2 Hours, in advance of need  - Initiate/Maintain bed alarm  - Obtain necessary fall risk management equipment:   - Apply yellow socks and bracelet for high fall risk patients  - Consider moving patient to room near nurses station  Outcome: Progressing     Problem: PAIN - ADULT  Goal: Verbalizes/displays adequate comfort level or baseline comfort level  Description: Interventions:  - Encourage patient to monitor pain and request assistance  - Assess pain using appropriate pain scale  - Administer analgesics based on type and severity of pain and evaluate response  - Implement non-pharmacological measures as appropriate and evaluate response  - Consider cultural and social influences on pain and pain management  - Notify physician/advanced practitioner if interventions unsuccessful or patient reports new pain  Outcome: Progressing     Problem: INFECTION - ADULT  Goal: Absence or prevention of progression during hospitalization  Description: INTERVENTIONS:  - Assess and monitor for signs and symptoms of infection  - Monitor lab/diagnostic results  - Monitor all insertion sites, i.e. indwelling lines, tubes, and drains  - Monitor endotracheal if appropriate and nasal secretions for changes in amount and color  - [place] appropriate cooling/warming therapies per order  - Administer medications as ordered  - Instruct and encourage patient and family to use good hand hygiene  technique  - Identify and instruct in appropriate isolation precautions for identified infection/condition  Outcome: Progressing  Goal: Absence of fever/infection during neutropenic period  Description: INTERVENTIONS:  - Monitor WBC    Outcome: Progressing     Problem: SAFETY ADULT  Goal: Patient will remain free of falls  Description: INTERVENTIONS:  - Educate patient/family on patient safety including physical limitations  - Instruct patient to call for assistance with activity   - Consult OT/PT to assist with strengthening/mobility   - Keep Call bell within reach  - Keep bed low and locked with side rails adjusted as appropriate  - Keep care items and personal belongings within reach  - Initiate and maintain comfort rounds  - Make Fall Risk Sign visible to staff  - Offer Toileting every 2 Hours, in advance of need  - Initiate/Maintain bed alarm  - Obtain necessary fall risk management equipment:   - Apply yellow socks and bracelet for high fall risk patients  - Consider moving patient to room near nurses station  Outcome: Progressing  Goal: Maintain or return to baseline ADL function  Description: INTERVENTIONS:  -  Assess patient's ability to carry out ADLs; assess patient's baseline for ADL function and identify physical deficits which impact ability to perform ADLs (bathing, care of mouth/teeth, toileting, grooming, dressing, etc.)  - Assess/evaluate cause of self-care deficits   - Assess range of motion  - Assess patient's mobility; develop plan if impaired  - Assess patient's need for assistive devices and provide as appropriate  - Encourage maximum independence but intervene and supervise when necessary  - Involve family in performance of ADLs  - Assess for home care needs following discharge   - Consider OT consult to assist with ADL evaluation and planning for discharge  - Provide patient education as appropriate  Outcome: Progressing  Goal: Maintains/Returns to pre admission functional  level  Description: INTERVENTIONS:  - Perform AM-PAC 6 Click Basic Mobility/ Daily Activity assessment daily.  - Set and communicate daily mobility goal to care team and patient/family/caregiver.   - Collaborate with rehabilitation services on mobility goals if consulted  - Perform Range of Motion 2 times a day.  - Reposition patient every 2 hours.  - Dangle patient 2 times a day  - Stand patient 2 times a day  - Ambulate patient 2 times a day  - Out of bed to chair 2 times a day   - Out of bed for meals 2 times a day  - Out of bed for toileting  - Record patient progress and toleration of activity level   Outcome: Progressing     Problem: RESPIRATORY - ADULT  Goal: Achieves optimal ventilation and oxygenation  Description: INTERVENTIONS:  - Assess for changes in respiratory status  - Assess for changes in mentation and behavior  - Position to facilitate oxygenation and minimize respiratory effort  - Oxygen administered by appropriate delivery if ordered  - Initiate smoking cessation education as indicated  - Encourage broncho-pulmonary hygiene including cough, deep breathe, Incentive Spirometry  - Assess the need for suctioning and aspirate as needed  - Assess and instruct to report SOB or any respiratory difficulty  - Respiratory Therapy support as indicated  Outcome: Progressing

## 2024-11-14 NOTE — PROGRESS NOTES
"Progress Note - Hospitalist   Name: Maximilian Mann 48 y.o. female I MRN: [id number]  Unit/Bed#: -01 I Date of Admission: 11/10/2024   Date of Service: 11/14/2024 I Hospital Day: 2    Assessment & Plan  Cystic fibrosis ([facility])  Came in for facial swelling, this is resolved. History of cystic fibrosis s/p 2 previous lung transplant with chronic rejection on tacrolimus/prednisone. Patient reports she is \"end-stage\" at this point, and not a candidate for another transplant  Continue chronic Mepron/Zithromax/Valtrex  11/11 spoke to Frances from [facility] transplant team, low threshold to treat for superimposed infection  Procal 9.85 with cough productive of green sputum and ongoing epigastric/substernal pain  Started doxy, switched to cefazolin given GBS on sputum - awaiting final sensitivities  Chronic pain disorder  Maintained chronically on oral oxycodone 10 mg every 4 hours as needed.  Increased to 15 mg or 20 mg for moderate to severe pain while hospitalized  IV Dilaudid for breakthrough  [place] K  Patient appears very comfortable but is still requesting additional pain medication for costochondritis like symptoms education and emotional support provided  Avoid increase in pain medication if at all possible  Hx of lung transplant ([facility])  Transplant x2, now with only left functioning lung and chronic rejection on tacrolimus/prednisone.  History of pulmonary embolus (PE)  Continue Eliquis.  Type 1 diabetes mellitus with diabetic chronic kidney disease, unspecified CKD stage ([facility])  Blood Sugar Average: Last 72 hrs:(P) 164.0175233116796330  Lantus 12 units nightly ordered  Sliding scale coverage with ACHS checks  Consistent carb diet  Hypoglycemia protocol  GERD (gastroesophageal reflux disease)  Continue Pepcid, add pantoprazole and carafate due to esophagitis on imaging. Can trial bentyl as well  Is due to EGD as well as Bravo pH test    VTE Pharmacologic Prophylaxis: VTE Score: 1 Low Risk (Score 0-2) - Encourage " Ambulation.    Mobility:   Basic Mobility Inpatient Raw Score: 24  JH-HLM Goal: 8: Walk 250 feet or more  JH-HLM Achieved: 3: Sit at edge of bed  JH-HLM Goal NOT achieved. Continue with multidisciplinary rounding and encourage appropriate mobility to improve upon JH-HLM goals.    Patient Centered Rounds: I performed bedside rounds with nursing staff today.   Discussions with Specialists or Other Care Team Provider: WESLY    Education and Discussions with Family / Patient: Patient declined call to .     Current Length of Stay: 2 day(s)  Current Patient Status: Inpatient   Certification Statement: The patient will continue to require additional inpatient hospital stay due to CF with probable pneumonia  Discharge Plan: Anticipate discharge tomorrow to home.    Code Status: Level 1 - Full Code    Subjective   Patient appears to be doing well she does not appear to be in any acute distress no conversational dyspnea noted denies any shortness of breath or difficulty breathing she is complaining of musculoskeletal pain related to coughing and feeling like she is so sore that she cannot cough strongly to bring mucus up.  Agreeable to try heating pad, cautioned against increasing any pain medicine that has not already been increased.    Objective :  Temp:  [97.7 °F (36.5 °C)-98.7 °F (37.1 °C)] 98.7 °F (37.1 °C)  HR:  [65-78] 65  BP: (126-132)/(72-78) 130/78  Resp:  [18] 18  SpO2:  [92 %-94 %] 92 %    Body mass index is 21.03 kg/m².     Input and Output Summary (last 24 hours):     Intake/Output Summary (Last 24 hours) at 11/14/2024 1433  Last data filed at 11/14/2024 1100  Gross per 24 hour   Intake 192 ml   Output 0 ml   Net 192 ml       Physical Exam  Vitals and nursing note reviewed.   Constitutional:       General: She is awake. She is not in acute distress.     Appearance: Normal appearance. She is well-developed and well-groomed. She is not ill-appearing or toxic-appearing.   HENT:      Head: Normocephalic.    Cardiovascular:      Rate and Rhythm: Normal rate.   Pulmonary:      Effort: Pulmonary effort is normal. No respiratory distress.      Breath sounds: Wheezes: scant.   Skin:     General: Skin is warm and dry.      Coloration: Skin is not pale.   Neurological:      Mental Status: She is alert and oriented to person, place, and time.   Psychiatric:         Mood and Affect: Mood normal.         Behavior: Behavior is cooperative.       Lines/Drains:  Lines/Drains/Airways       Active Status       Name Placement date Placement time Site Days    Port A Cath 11/25/22 Right Chest 11/25/22  0953  Chest  720                    Central Line:  Goal for removal:  PORT present                Lab Results: I have reviewed the following results:   Results from last 7 days   Lab Units 11/13/24  0355   WBC Thousand/uL 5.04   HEMOGLOBIN g/dL 8.5*   HEMATOCRIT % 30.5*   PLATELETS Thousands/uL 127*   SEGS PCT % 62   LYMPHO PCT % 25   MONO PCT % 12   EOS PCT % 1     Results from last 7 days   Lab Units 11/13/24  0355 11/12/24  0520   SODIUM mmol/L 135 134*   POTASSIUM mmol/L 5.2 5.1   CHLORIDE mmol/L 102 101   CO2 mmol/L 27 29   BUN mg/dL 20 21   CREATININE mg/dL 1.02 0.93   ANION GAP mmol/L 6 4   CALCIUM mg/dL 8.0* 8.0*   ALBUMIN g/dL  --  2.9*   TOTAL BILIRUBIN mg/dL  --  0.27   ALK PHOS U/L  --  63   ALT U/L  --  19   AST U/L  --  14   GLUCOSE RANDOM mg/dL 294* 281*         Results from last 7 days   Lab Units 11/14/24  1046 11/14/24  0634 11/13/24  1611 11/13/24  1051 11/13/24  0642 11/12/24  1636 11/12/24  1632 11/12/24  1036 11/12/24  0624 11/11/24  1638 11/11/24  1048 11/11/24  0606   POC GLUCOSE mg/dl 140 139 237* 93 267* 76 49* 107 275* 92 150* 348*         Results from last 7 days   Lab Units 11/13/24  0355 11/12/24  0520   PROCALCITONIN ng/ml 4.33* 9.85*       Recent Cultures (last 7 days):   Results from last 7 days   Lab Units 11/11/24  1205   SPUTUM CULTURE  4+ Growth of Beta Hemolytic Streptococcus Group B*  4+ Growth of   4+ Growth of Candida albicans*   GRAM STAIN RESULT  1+ Epithelial cells per low power field*  3+ Gram positive cocci in pairs*  No polys seen*       Imaging Results Review: No pertinent imaging studies reviewed.  Other Study Results Review: No additional pertinent studies reviewed.    Last 24 Hours Medication List:     Current Facility-Administered Medications:     acetaminophen (TYLENOL) tablet 650 mg, Q6H PRN    acetaminophen (TYLENOL) tablet 975 mg, Q8H DOTTY    albuterol inhalation solution 5 mg, Q4H PRN    ALPRAZolam (XANAX) tablet 1 mg, HS PRN    apixaban (ELIQUIS) tablet 5 mg, BID    atovaquone (MEPRON) oral suspension 1,500 mg, Daily    azithromycin (ZITHROMAX) tablet 250 mg, Once per day on Monday Wednesday Friday    budesonide-formoterol (SYMBICORT) 160-4.5 mcg/act inhaler 2 puff, Q12H    calcium carbonate (TUMS) chewable tablet 500 mg, Daily PRN    ceFAZolin (ANCEF) IVPB (premix in dextrose) 2,000 mg 50 mL, Q8H, Last Rate: 2,000 mg (11/14/24 1413)    cyclobenzaprine (FLEXERIL) tablet 5 mg, TID PRN    dicyclomine (BENTYL) capsule 20 mg, 4x Daily (AC & HS)    diphenhydrAMINE (BENADRYL) injection 25 mg, Q6H PRN    famotidine (PEPCID) tablet 40 mg, HS    guaiFENesin (MUCINEX) 12 hr tablet 1,200 mg, Q12H DOTTY    HYDROmorphone (DILAUDID) injection 0.5 mg, Q4H PRN    insulin glargine (LANTUS) subcutaneous injection 12 Units 0.12 mL, QAM    insulin lispro (HumALOG/ADMELOG) 100 units/mL subcutaneous injection 10 Units, Daily With Breakfast    insulin lispro (HumALOG/ADMELOG) 100 units/mL subcutaneous injection 12 Units, Daily With Dinner    insulin lispro (HumALOG/ADMELOG) 100 units/mL subcutaneous injection 2-7 Units, TID AC **AND** Fingerstick Glucose (POCT), TID AC    insulin lispro (HumALOG/ADMELOG) 100 units/mL subcutaneous injection 8 Units, Daily With Lunch    metoclopramide (REGLAN) injection 5 mg, Q6H DOTTY    naloxone (NARCAN) 0.04 mg/mL syringe 0.04 mg, Q1MIN PRN    nystatin (MYCOSTATIN) oral suspension  500,000 Units, 4x Daily    oxyCODONE (ROXICODONE) IR tablet 15 mg, Q4H PRN **AND** oxyCODONE (ROXICODONE) immediate release tablet 20 mg, Q4H PRN    pancrelipase (Lip-Prot-Amyl) (CREON) delayed release capsule 24,000 Units, TID PRN    pancrelipase (Lip-Prot-Amyl) (CREON) delayed release capsule 48,000 Units, TID With Meals    pantoprazole (PROTONIX) EC tablet 40 mg, BID AC    polyethylene glycol (MIRALAX) packet 17 g, Daily PRN    predniSONE tablet 15 mg, Daily    pregabalin (LYRICA) capsule 150 mg, BID    sucralfate (CARAFATE) tablet 1 g, TID AC    tacrolimus (PROGRAF) capsule 2 mg, BID    valACYclovir (VALTREX) tablet 500 mg, Daily    Administrative Statements   Today, Patient Was Seen By: WAYNE Houser  I have spent a total time of 50 minutes in caring for this patient on the day of the visit/encounter including Counseling / Coordination of care, Documenting in the medical record, Reviewing / ordering tests, medicine, procedures  , and Communicating with other healthcare professionals .    **Please Note: This note may have been constructed using a voice recognition system.**

## 2024-11-14 NOTE — ASSESSMENT & PLAN NOTE
"Came in for facial swelling, this is resolved. History of cystic fibrosis s/p 2 previous lung transplant with chronic rejection on tacrolimus/prednisone. Patient reports she is \"end-stage\" at this point, and not a candidate for another transplant  Continue chronic Mepron/Zithromax/Valtrex  11/11 spoke to Frances from [facility] transplant team, low threshold to treat for superimposed infection  Procal 9.85 with cough productive of green sputum and ongoing epigastric/substernal pain  Started doxy, switched to cefazolin given GBS on sputum - awaiting final sensitivities  "

## 2024-11-14 NOTE — ASSESSMENT & PLAN NOTE
Blood Sugar Average: Last 72 hrs:(P) [id number]  Lantus 12 units nightly ordered  Sliding scale coverage with ACHS checks  Consistent carb diet  Hypoglycemia protocol

## 2024-11-14 NOTE — CASE MANAGEMENT
Case Management Discharge Planning Note    Patient name Maximilian Mann  Location /-01 MRN [id number]  : 1975 Date 2024       Current Admission Date: 11/10/2024  Current Admission Diagnosis:Cystic fibrosis (HCC)   Patient Active Problem List    Diagnosis Date Noted Date Diagnosed    Chest pain 2024     Pancytopenia, acquired ([facility]) 10/24/2024     History of pulmonary embolus (PE) 2024     History of DVT (deep vein thrombosis) 2024     Sore throat 2024     Vaginal yeast infection 2024     Intestinal metaplasia of antrum of stomach without dysplasia 2024     Hx of lung transplant ([facility]) 01/15/2024     Continuous opioid dependence ([facility]) 01/15/2024     Stage 3a chronic kidney disease ([facility]) 2023     Depressed mood 2023     Chronic pancreatitis, unspecified pancreatitis type ([facility]) 2023     Pulmonary embolus (HCC) 2022     Right leg pain 10/13/2022     Chronic cough 2022     Renal calculus, left 2022     Pulmonary nodules 2022     Rib pain 2022     Gastroparesis 2022     Ulcerative pancolitis without complication ([facility]) 2022     Neuropathic pain of right foot 01/10/2022     Weakness of right lower extremity 2021     Pulmonary hypertension (HCC) 2021     Cystic fibrosis ([facility]) 2021     Essential hypertension 2021     Hypercholesteremia 2021     Long term current use of bisphosphonates 2020     Long term current use of systemic steroids 2020     Osteopenia 2020     Chronic pain disorder 2020     Asthma 2020     Nausea and vomiting 2020     Hiatal hernia 2019     Anemia 2018     DVT (deep venous thrombosis) ([facility]) 2018     Immunosuppression ([facility]) 2017     Colon polyp, personal history 2016     S/P pneumonectomy 2013     Anxiety 2012     Type 1 diabetes mellitus with diabetic chronic kidney  disease, unspecified CKD stage (HCC) 10/28/2010     Pancreatic insufficiency 10/28/2010     Chronic sinusitis 10/28/2010     GERD (gastroesophageal reflux disease) 10/28/2010       LOS (days): 2  Geometric Mean LOS (GMLOS) (days): 3.7  Days to GMLOS:1.6     OBJECTIVE:  Risk of Unplanned Readmission Score: 45.02         Current admission status: Inpatient   Preferred Pharmacy:   [facility]  [place], [place] [address] [facility]  [address]  [place] [address]  Phone: [phone] Fax: [phone]    [facility]/pharmacy #[id number] - [place] - [address]  [address]  [place] [id number]  Phone: [phone] Fax: [phone]    [facility]/pharmacy #[id number] - [place] [address]  [address]  [place] [address]  Phone: [phone] Fax: [phone]    Primary Care Provider: Solis Avila MD    Primary Insurance: BLUE CROSS  Secondary Insurance: MEDICARE    DISCHARGE DETAILS:  As per SLIM rounds, patient chronically ill due to cystic fibrosis. Anticipated discharge 24 hrs.

## 2024-11-15 LAB
ANION GAP SERPL CALCULATED.3IONS-SCNC: 5 MMOL/L (ref 4–13)
BUN SERPL-MCNC: 31 MG/DL (ref 5–25)
CALCIUM SERPL-MCNC: 8.6 MG/DL (ref 8.4–10.2)
CHLORIDE SERPL-SCNC: 105 MMOL/L (ref 96–108)
CO2 SERPL-SCNC: 28 MMOL/L (ref 21–32)
CREAT SERPL-MCNC: 0.86 MG/DL (ref 0.6–1.3)
ERYTHROCYTE [DISTWIDTH] IN BLOOD BY AUTOMATED COUNT: 17.1 % (ref 11.6–15.1)
GFR SERPL CREATININE-BSD FRML MDRD: 81 ML/MIN/1.73SQ M
GLUCOSE SERPL-MCNC: 108 MG/DL (ref 65–140)
GLUCOSE SERPL-MCNC: 115 MG/DL (ref 65–140)
GLUCOSE SERPL-MCNC: 131 MG/DL (ref 65–140)
GLUCOSE SERPL-MCNC: 223 MG/DL (ref 65–140)
GLUCOSE SERPL-MCNC: 229 MG/DL (ref 65–140)
HCT VFR BLD AUTO: 32 % (ref 34.8–46.1)
HGB BLD-MCNC: 9.2 G/DL (ref 11.5–15.4)
MAGNESIUM SERPL-MCNC: 1.6 MG/DL (ref 1.9–2.7)
MCH RBC QN AUTO: 26.8 PG (ref 26.8–34.3)
MCHC RBC AUTO-ENTMCNC: 28.8 G/DL (ref 31.4–37.4)
MCV RBC AUTO: 93 FL (ref 82–98)
PLATELET # BLD AUTO: 89 THOUSANDS/UL (ref 149–390)
PMV BLD AUTO: 10.3 FL (ref 8.9–12.7)
POTASSIUM SERPL-SCNC: 4.7 MMOL/L (ref 3.5–5.3)
RBC # BLD AUTO: 3.43 MILLION/UL (ref 3.81–5.12)
SODIUM SERPL-SCNC: 138 MMOL/L (ref 135–147)
WBC # BLD AUTO: 4.6 THOUSAND/UL (ref 4.31–10.16)

## 2024-11-15 PROCEDURE — 83735 ASSAY OF MAGNESIUM: CPT | Performed by: NURSE PRACTITIONER

## 2024-11-15 PROCEDURE — 80048 BASIC METABOLIC PNL TOTAL CA: CPT | Performed by: NURSE PRACTITIONER

## 2024-11-15 PROCEDURE — 85027 COMPLETE CBC AUTOMATED: CPT | Performed by: NURSE PRACTITIONER

## 2024-11-15 PROCEDURE — 82948 REAGENT STRIP/BLOOD GLUCOSE: CPT

## 2024-11-15 PROCEDURE — 99232 SBSQ HOSP IP/OBS MODERATE 35: CPT

## 2024-11-15 RX ADMIN — METOCLOPRAMIDE 5 MG: 5 INJECTION, SOLUTION INTRAMUSCULAR; INTRAVENOUS at 18:28

## 2024-11-15 RX ADMIN — DICYCLOMINE HYDROCHLORIDE 20 MG: 10 CAPSULE ORAL at 21:06

## 2024-11-15 RX ADMIN — NYSTATIN 500000 UNITS: 100000 SUSPENSION ORAL at 21:22

## 2024-11-15 RX ADMIN — INSULIN LISPRO 12 UNITS: 100 INJECTION, SOLUTION INTRAVENOUS; SUBCUTANEOUS at 16:56

## 2024-11-15 RX ADMIN — CEFTRIAXONE SODIUM 1000 MG: 10 INJECTION, POWDER, FOR SOLUTION INTRAVENOUS at 18:26

## 2024-11-15 RX ADMIN — FAMOTIDINE 40 MG: 20 TABLET, FILM COATED ORAL at 21:06

## 2024-11-15 RX ADMIN — OXYCODONE HYDROCHLORIDE 20 MG: 10 TABLET ORAL at 09:00

## 2024-11-15 RX ADMIN — DICYCLOMINE HYDROCHLORIDE 20 MG: 10 CAPSULE ORAL at 15:25

## 2024-11-15 RX ADMIN — SUCRALFATE 1 G: 1 TABLET ORAL at 08:59

## 2024-11-15 RX ADMIN — CYCLOBENZAPRINE HYDROCHLORIDE 5 MG: 10 TABLET, FILM COATED ORAL at 09:41

## 2024-11-15 RX ADMIN — HYDROMORPHONE HYDROCHLORIDE 0.5 MG: 1 INJECTION, SOLUTION INTRAMUSCULAR; INTRAVENOUS; SUBCUTANEOUS at 16:28

## 2024-11-15 RX ADMIN — NYSTATIN 500000 UNITS: 100000 SUSPENSION ORAL at 11:34

## 2024-11-15 RX ADMIN — TACROLIMUS 2 MG: 0.5 CAPSULE ORAL at 17:02

## 2024-11-15 RX ADMIN — GUAIFENESIN 1200 MG: 600 TABLET ORAL at 08:52

## 2024-11-15 RX ADMIN — VALACYCLOVIR HYDROCHLORIDE 500 MG: 500 TABLET, FILM COATED ORAL at 08:52

## 2024-11-15 RX ADMIN — INSULIN LISPRO 3 UNITS: 100 INJECTION, SOLUTION INTRAVENOUS; SUBCUTANEOUS at 11:35

## 2024-11-15 RX ADMIN — TACROLIMUS 2 MG: 0.5 CAPSULE ORAL at 08:51

## 2024-11-15 RX ADMIN — ATOVAQUONE 1500 MG: 750 SUSPENSION ORAL at 08:32

## 2024-11-15 RX ADMIN — ACETAMINOPHEN 975 MG: 325 TABLET, FILM COATED ORAL at 14:15

## 2024-11-15 RX ADMIN — ACETAMINOPHEN 975 MG: 325 TABLET, FILM COATED ORAL at 05:11

## 2024-11-15 RX ADMIN — HYDROMORPHONE HYDROCHLORIDE 0.5 MG: 1 INJECTION, SOLUTION INTRAMUSCULAR; INTRAVENOUS; SUBCUTANEOUS at 11:31

## 2024-11-15 RX ADMIN — OXYCODONE HYDROCHLORIDE 20 MG: 10 TABLET ORAL at 02:38

## 2024-11-15 RX ADMIN — PANCRELIPASE 48000 UNITS: 120000; 24000; 76000 CAPSULE, DELAYED RELEASE PELLETS ORAL at 16:56

## 2024-11-15 RX ADMIN — INSULIN LISPRO 8 UNITS: 100 INJECTION, SOLUTION INTRAVENOUS; SUBCUTANEOUS at 11:36

## 2024-11-15 RX ADMIN — NYSTATIN 500000 UNITS: 100000 SUSPENSION ORAL at 08:55

## 2024-11-15 RX ADMIN — INSULIN GLARGINE 12 UNITS: 100 INJECTION, SOLUTION SUBCUTANEOUS at 09:41

## 2024-11-15 RX ADMIN — NYSTATIN 500000 UNITS: 100000 SUSPENSION ORAL at 17:02

## 2024-11-15 RX ADMIN — METOCLOPRAMIDE 5 MG: 5 INJECTION, SOLUTION INTRAMUSCULAR; INTRAVENOUS at 11:21

## 2024-11-15 RX ADMIN — PREGABALIN 150 MG: 75 CAPSULE ORAL at 08:51

## 2024-11-15 RX ADMIN — METOCLOPRAMIDE 5 MG: 5 INJECTION, SOLUTION INTRAMUSCULAR; INTRAVENOUS at 05:12

## 2024-11-15 RX ADMIN — BUDESONIDE AND FORMOTEROL FUMARATE DIHYDRATE 2 PUFF: 160; 4.5 AEROSOL RESPIRATORY (INHALATION) at 18:30

## 2024-11-15 RX ADMIN — ACETAMINOPHEN 975 MG: 325 TABLET, FILM COATED ORAL at 21:06

## 2024-11-15 RX ADMIN — DICYCLOMINE HYDROCHLORIDE 20 MG: 10 CAPSULE ORAL at 08:31

## 2024-11-15 RX ADMIN — AZITHROMYCIN 250 MG: 250 TABLET, FILM COATED ORAL at 08:51

## 2024-11-15 RX ADMIN — PANTOPRAZOLE SODIUM 40 MG: 40 TABLET, DELAYED RELEASE ORAL at 08:59

## 2024-11-15 RX ADMIN — PANCRELIPASE 48000 UNITS: 120000; 24000; 76000 CAPSULE, DELAYED RELEASE PELLETS ORAL at 08:54

## 2024-11-15 RX ADMIN — HYDROMORPHONE HYDROCHLORIDE 0.5 MG: 1 INJECTION, SOLUTION INTRAMUSCULAR; INTRAVENOUS; SUBCUTANEOUS at 05:11

## 2024-11-15 RX ADMIN — ALPRAZOLAM 1 MG: 0.5 TABLET ORAL at 21:06

## 2024-11-15 RX ADMIN — GUAIFENESIN 1200 MG: 600 TABLET ORAL at 21:06

## 2024-11-15 RX ADMIN — PREGABALIN 150 MG: 75 CAPSULE ORAL at 17:02

## 2024-11-15 RX ADMIN — PREDNISONE 15 MG: 5 TABLET ORAL at 08:51

## 2024-11-15 RX ADMIN — OXYCODONE HYDROCHLORIDE 15 MG: 10 TABLET ORAL at 14:56

## 2024-11-15 RX ADMIN — HYDROMORPHONE HYDROCHLORIDE 0.5 MG: 1 INJECTION, SOLUTION INTRAMUSCULAR; INTRAVENOUS; SUBCUTANEOUS at 20:46

## 2024-11-15 RX ADMIN — OXYCODONE HYDROCHLORIDE 20 MG: 10 TABLET ORAL at 19:26

## 2024-11-15 RX ADMIN — CEFAZOLIN SODIUM 2000 MG: 2 SOLUTION INTRAVENOUS at 05:11

## 2024-11-15 RX ADMIN — APIXABAN 5 MG: 5 TABLET, FILM COATED ORAL at 17:02

## 2024-11-15 RX ADMIN — APIXABAN 5 MG: 5 TABLET, FILM COATED ORAL at 08:52

## 2024-11-15 RX ADMIN — DICYCLOMINE HYDROCHLORIDE 20 MG: 10 CAPSULE ORAL at 11:33

## 2024-11-15 RX ADMIN — SUCRALFATE 1 G: 1 TABLET ORAL at 15:25

## 2024-11-15 RX ADMIN — CEFAZOLIN SODIUM 2000 MG: 2 SOLUTION INTRAVENOUS at 14:15

## 2024-11-15 RX ADMIN — PANCRELIPASE 48000 UNITS: 120000; 24000; 76000 CAPSULE, DELAYED RELEASE PELLETS ORAL at 11:34

## 2024-11-15 RX ADMIN — INSULIN LISPRO 3 UNITS: 100 INJECTION, SOLUTION INTRAVENOUS; SUBCUTANEOUS at 16:56

## 2024-11-15 RX ADMIN — SUCRALFATE 1 G: 1 TABLET ORAL at 11:21

## 2024-11-15 RX ADMIN — PANTOPRAZOLE SODIUM 40 MG: 40 TABLET, DELAYED RELEASE ORAL at 15:25

## 2024-11-15 RX ADMIN — METOCLOPRAMIDE 5 MG: 5 INJECTION, SOLUTION INTRAMUSCULAR; INTRAVENOUS at 01:10

## 2024-11-15 NOTE — ASSESSMENT & PLAN NOTE
Blood Sugar Average: Last 72 hrs:(P) [id number]  Lantus 12 units nightly ordered  Sliding scale coverage with ACHS checks  Consistent carb diet  Hypoglycemia protocol

## 2024-11-15 NOTE — PROGRESS NOTES
"Progress Note - Hospitalist   Name: Maximilian Mann 48 y.o. female I MRN: [id number]  Unit/Bed#: -01 I Date of Admission: 11/10/2024   Date of Service: 11/15/2024 I Hospital Day: 3    Assessment & Plan  Cystic fibrosis ([facility])  Came in for facial swelling, this is resolved. History of cystic fibrosis s/p 2 previous lung transplant with chronic rejection on tacrolimus/prednisone. Patient reports she is \"end-stage\" at this point, and not a candidate for another transplant  Continue chronic Mepron/Zithromax/Valtrex  11/11 spoke to Frances from [facility] transplant team, low threshold to treat for superimposed infection  Case discussed again with Frances Camarillo, will continue IV abx for 14 days. Patient has poor tolerance for PO abx will discharge with IV Ceftriaxone  Case management to assist with setting up home care  Procal 9.85 with cough productive of green sputum and ongoing epigastric/substernal pain  Started doxy, switched to cefazolin given GBS on sputum - final sensitivity to Ceftriaxone will continue for 14 days   Chronic pain disorder  Maintained chronically on oral oxycodone 10 mg every 4 hours as needed.  Increased to 15 mg or 20 mg for moderate to severe pain while hospitalized  IV Dilaudid for breakthrough  Aqua K  Patient appears very comfortable but is still requesting additional pain medication for costochondritis like symptoms education and emotional support provided  Avoid increase in pain medication if at all possible  Hx of lung transplant ([facility])  Transplant x2, now with only left functioning lung and chronic rejection on tacrolimus/prednisone.  History of pulmonary embolus (PE)  Continue Eliquis.  Type 1 diabetes mellitus with diabetic chronic kidney disease, unspecified CKD stage ([facility])  Blood Sugar Average: Last 72 hrs:(P) 161.7900089790569011  Lantus 12 units nightly ordered  Sliding scale coverage with ACHS checks  Consistent carb diet  Hypoglycemia protocol  GERD (gastroesophageal reflux " disease)  Continue Pepcid, add pantoprazole and carafate due to esophagitis on imaging. Can trial bentyl as well  Is due to EGD as well as Bravo pH test    VTE Pharmacologic Prophylaxis: VTE Score: 1 Low Risk (Score 0-2) - Encourage Ambulation.    Mobility:   Basic Mobility Inpatient Raw Score: 24  JH-HLM Goal: 8: Walk 250 feet or more  JH-HLM Achieved: 1: Laying in bed  JH-HLM Goal NOT achieved. Continue with multidisciplinary rounding and encourage appropriate mobility to improve upon JH-HLM goals.    Patient Centered Rounds: I performed bedside rounds with nursing staff today.   Discussions with Specialists or Other Care Team Provider:   Frances Camarillo Transplant  provider at [facility]    Education and Discussions with Family / Patient: Patient declined call to .     Current Length of Stay: 3 day(s)  Current Patient Status: Inpatient   Certification Statement: The patient will continue to require additional inpatient hospital stay due to need for IV antibiotics, pain controlled  Discharge Plan: Anticipate discharge tomorrow to home with home services.    Code Status: Level 1 - Full Code    Subjective   Patient seen and examined sitting up in bed. Reports improved pain control. Amendable to current plan of care    Objective :  Temp:  [97.7 °F (36.5 °C)-98.9 °F (37.2 °C)] 98.9 °F (37.2 °C)  HR:  [77-85] 85  BP: (136-158)/(76-89) 136/86  Resp:  [18] 18  SpO2:  [94 %-95 %] 94 %  O2 Device: None (Room air)    Body mass index is 21.03 kg/m².     Input and Output Summary (last 24 hours):   No intake or output data in the 24 hours ending 11/15/24 9993    Physical Exam  Vitals and nursing note reviewed.   Constitutional:       General: She is not in acute distress.     Appearance: Normal appearance.   HENT:      Head: Normocephalic.   Eyes:      General: No scleral icterus.  Cardiovascular:      Rate and Rhythm: Normal rate and regular rhythm.      Pulses: Normal pulses.      Heart sounds: Normal heart sounds.    Pulmonary:      Breath sounds: Rhonchi present.   Abdominal:      General: There is no distension.      Palpations: Abdomen is soft.   Musculoskeletal:         General: No swelling.   Skin:     General: Skin is warm.   Neurological:      Mental Status: She is alert and oriented to person, place, and time.   Psychiatric:         Mood and Affect: Mood normal.         Behavior: Behavior normal.         Thought Content: Thought content normal.           Lines/Drains:  Lines/Drains/Airways       Active Status       Name Placement date Placement time Site Days    Port A Cath 11/25/22 Right Chest 11/25/22  0953  Chest  721                    Central Line:  Goal for removal: Port accessed. Will de-access as appropriate.               Lab Results: I have reviewed the following results:   Results from last 7 days   Lab Units 11/15/24  0555 11/13/24  0355   WBC Thousand/uL 4.60 5.04   HEMOGLOBIN g/dL 9.2* 8.5*   HEMATOCRIT % 32.0* 30.5*   PLATELETS Thousands/uL 89* 127*   SEGS PCT %  --  62   LYMPHO PCT %  --  25   MONO PCT %  --  12   EOS PCT %  --  1     Results from last 7 days   Lab Units 11/15/24  0555 11/13/24  0355 11/12/24  0520   SODIUM mmol/L 138   < > 134*   POTASSIUM mmol/L 4.7   < > 5.1   CHLORIDE mmol/L 105   < > 101   CO2 mmol/L 28   < > 29   BUN mg/dL 31*   < > 21   CREATININE mg/dL 0.86   < > 0.93   ANION GAP mmol/L 5   < > 4   CALCIUM mg/dL 8.6   < > 8.0*   ALBUMIN g/dL  --   --  2.9*   TOTAL BILIRUBIN mg/dL  --   --  0.27   ALK PHOS U/L  --   --  63   ALT U/L  --   --  19   AST U/L  --   --  14   GLUCOSE RANDOM mg/dL 131   < > 281*    < > = values in this interval not displayed.         Results from last 7 days   Lab Units 11/15/24  1536 11/15/24  1126 11/15/24  0622 11/14/24  2051 11/14/24  1608 11/14/24  1046 11/14/24  0634 11/13/24  1611 11/13/24  1051 11/13/24  0642 11/12/24  1636 11/12/24  1632   POC GLUCOSE mg/dl 223* 229* 115 208* 106 140 139 237* 93 267* 76 49*         Results from last 7 days    Lab Units 11/13/24  0355 11/12/24  0520   PROCALCITONIN ng/ml 4.33* 9.85*       Recent Cultures (last 7 days):   Results from last 7 days   Lab Units 11/11/24  1205   SPUTUM CULTURE  4+ Growth of Streptococcus agalactiae (Group B)*  4+ Growth of  4+ Growth of Candida albicans*   GRAM STAIN RESULT  1+ Epithelial cells per low power field*  3+ Gram positive cocci in pairs*  No polys seen*       Imaging Results Review: No pertinent imaging studies reviewed.  Other Study Results Review: No additional pertinent studies reviewed.    Last 24 Hours Medication List:     Current Facility-Administered Medications:     acetaminophen (TYLENOL) tablet 650 mg, Q6H PRN    acetaminophen (TYLENOL) tablet 975 mg, Q8H DOTTY    albuterol inhalation solution 5 mg, Q4H PRN    ALPRAZolam (XANAX) tablet 1 mg, HS PRN    apixaban (ELIQUIS) tablet 5 mg, BID    atovaquone (MEPRON) oral suspension 1,500 mg, Daily    azithromycin (ZITHROMAX) tablet 250 mg, Once per day on Monday Wednesday Friday    budesonide-formoterol (SYMBICORT) 160-4.5 mcg/act inhaler 2 puff, Q12H    calcium carbonate (TUMS) chewable tablet 500 mg, Daily PRN    ceFAZolin (ANCEF) IVPB (premix in dextrose) 2,000 mg 50 mL, Q8H, Last Rate: 2,000 mg (11/15/24 1415)    cyclobenzaprine (FLEXERIL) tablet 5 mg, TID PRN    dicyclomine (BENTYL) capsule 20 mg, 4x Daily (AC & HS)    diphenhydrAMINE (BENADRYL) injection 25 mg, Q6H PRN    famotidine (PEPCID) tablet 40 mg, HS    guaiFENesin (MUCINEX) 12 hr tablet 1,200 mg, Q12H DOTTY    HYDROmorphone (DILAUDID) injection 0.5 mg, Q4H PRN    insulin glargine (LANTUS) subcutaneous injection 12 Units 0.12 mL, QAM    insulin lispro (HumALOG/ADMELOG) 100 units/mL subcutaneous injection 10 Units, Daily With Breakfast    insulin lispro (HumALOG/ADMELOG) 100 units/mL subcutaneous injection 12 Units, Daily With Dinner    insulin lispro (HumALOG/ADMELOG) 100 units/mL subcutaneous injection 2-7 Units, TID AC **AND** Fingerstick Glucose (POCT), TID  AC    insulin lispro (HumALOG/ADMELOG) 100 units/mL subcutaneous injection 8 Units, Daily With Lunch    metoclopramide (REGLAN) injection 5 mg, Q6H DOTTY    naloxone (NARCAN) 0.04 mg/mL syringe 0.04 mg, Q1MIN PRN    nystatin (MYCOSTATIN) oral suspension 500,000 Units, 4x Daily    oxyCODONE (ROXICODONE) IR tablet 15 mg, Q4H PRN **AND** oxyCODONE (ROXICODONE) immediate release tablet 20 mg, Q4H PRN    pancrelipase (Lip-Prot-Amyl) (CREON) delayed release capsule 24,000 Units, TID PRN    pancrelipase (Lip-Prot-Amyl) (CREON) delayed release capsule 48,000 Units, TID With Meals    pantoprazole (PROTONIX) EC tablet 40 mg, BID AC    polyethylene glycol (MIRALAX) packet 17 g, Daily PRN    predniSONE tablet 15 mg, Daily    pregabalin (LYRICA) capsule 150 mg, BID    sucralfate (CARAFATE) tablet 1 g, TID AC    tacrolimus (PROGRAF) capsule 2 mg, BID    valACYclovir (VALTREX) tablet 500 mg, Daily    Administrative Statements   Today, Patient Was Seen By: WAYNE Templeton  I have spent a total time of 35 minutes in caring for this patient on the day of the visit/encounter including Risks and benefits of tx options, Patient and family education, Risk factor reductions, Impressions, Counseling / Coordination of care, Documenting in the medical record, Reviewing / ordering tests, medicine, procedures  , Obtaining or reviewing history  , and Communicating with other healthcare professionals .    **Please Note: This note may have been constructed using a voice recognition system.**

## 2024-11-15 NOTE — ASSESSMENT & PLAN NOTE
"Came in for facial swelling, this is resolved. History of cystic fibrosis s/p 2 previous lung transplant with chronic rejection on tacrolimus/prednisone. Patient reports she is \"end-stage\" at this point, and not a candidate for another transplant  Continue chronic Mepron/Zithromax/Valtrex  11/11 spoke to Frances from [facility] transplant team, low threshold to treat for superimposed infection  Case discussed again with Frances Camarillo, will continue IV abx for 14 days. Patient has poor tolerance for PO abx will discharge with IV Ceftriaxone  Case management to assist with setting up home care  Procal 9.85 with cough productive of green sputum and ongoing epigastric/substernal pain  Started doxy, switched to cefazolin given GBS on sputum - final sensitivity to Ceftriaxone will continue for 14 days   "

## 2024-11-15 NOTE — UTILIZATION REVIEW
NOTIFICATION OF INPATIENT ADMISSION   AUTHORIZATION REQUEST   SERVICING FACILITY:   [place] [address]  Tax ID: [id number]  NPI: [id number] ATTENDING PROVIDER:  Attending Name and NPI#: Armand Juarez Md [[id number]]  Address: [address] [place] [address]  Phone: [phone]     ADMISSION INFORMATION:  Place of Service: Inpatient Acute [facility]  Place of Service Code: 21  Inpatient Admission Date/Time: 11/12/24 11:44 AM  Discharge Date/Time: No discharge date for patient encounter.  Admitting Diagnosis Code/Description:  Facial swelling [R22.0]  Productive cough [R05.8]  Immunosuppression (HCC) [D84.9]  Hx of lung transplant ([facility]) [Z94.2]  Angioedema, initial encounter [T78.3XXA]     UTILIZATION REVIEW CONTACT:  Tata Moreno, Utilization   Network Utilization Review Department  Phone: [phone]  Fax [phone]  Email: [email or web address]  Contact for approvals/pending authorizations, clinical reviews, and discharge.     PHYSICIAN ADVISORY SERVICES:  Medical Necessity Denial & Iysf-yq-Ejue Review  Phone: [phone]  Fax: [phone]  Email: [email or web address]     DISCHARGE SUPPORT TEAM:  For Patients Discharge Needs & Updates  Phone: [phone] opt. 2 Fax: [phone]  Email: [email or web address]

## 2024-11-15 NOTE — PROGRESS NOTES
Patient:    MRN:  [id number]    Fredis Request ID:  [id number]    Level of care reserved:  Home Health Agency    Partner Reserved:  [facility]04 [phone]    Clinical needs requested:    Geography searched:  [id number]    Start of Service:    Request sent:  10:49am EST on 11/12/2024 by Cara Perdomo    Partner reserved:  4:40pm EST on 11/15/2024 by Cara Perdomo    Choice list shared:  4:21pm EST on 11/15/2024 by Cara Perdomo

## 2024-11-15 NOTE — UTILIZATION REVIEW
Initial Clinical Review    OBSERVATION   11/10  @  1941  CHANGED TO IP ADMISSION  11/12  @   1144  The patient, admitted on an observation basis, will now require > 2 midnight hospital stay due to couch, CF, abdominal pain     Admission: Date/Time/Statement:   Admission Orders (From admission, onward)       Ordered        11/12/24 1144  INPATIENT ADMISSION  Once            11/10/24 1941  Place in Observation  Once                          Orders Placed This Encounter   Procedures    Place in Observation     Standing Status:   Standing     Number of Occurrences:   1     Level of Care:   Med Surg [16]    INPATIENT ADMISSION     Standing Status:   Standing     Number of Occurrences:   1     Level of Care:   Med Surg [16]     Estimated length of stay:   More than 2 Midnights     Certification:   I certify that inpatient services are medically necessary for this patient for a duration of greater than two midnights. See H&P and MD Progress Notes for additional information about the patient's course of treatment.     ED Arrival Information       Expected   -    Arrival   11/10/2024 13:19    Acuity   Emergent              Means of arrival   Walk-In    Escorted by   Spouse    Service   Hospitalist    Admission type   Emergency              Arrival complaint   heart burn, facial swelling, & fever - double lung transplant pt             Chief Complaint   Patient presents with    Facial Swelling     Pt woke up this morning with facial swelling and redness and trouble swallowing. No breathing compromised. 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 Pt has generalized weakness. Pt had fever this morning and took tylenol at 8am       Initial Presentation: 48 y.o. female presents to ED  from home  with a sudden onset facial/neck swelling  the morning of admission.  Complained of heartburn  all night, fell asleep about  0300.   Unable to open eyes  upon waking due   to facial swelling.   + sore throat.  No swallowing  "difficulty.   PMH  is  CF, S/P lung transplant, chronic rejection,  on  tacrolimus/prednisone and angioedema,  idiopathic in the past,  chronic pain,   GERD,  DM1,  No indication for transfer per  [facility].   CT soft tissue of neck normal,  Ct chest  normal.  Given IV  s/medrol in ED.  Admit  Observation with  Angioedema  and plan is   TIAGO,   oral prednisone,  S/P  IVF  bolus in  ED, benadryl PRN   and continue home meds.      11/11    0850  C/O   SSCP   for  past  2 days, intermittent,  no  radiation.  Pain worse with coughing.  No increased  SOB.  Has diarrhea for  past  2 days, denies blood  in stool,  states  no prior episodes like this.  Recently  on antibiotics.   Was on  7 day course  of minocycline   for  CF in addition to  chronic antibiotics. Given lidocaine patch.   Some improvement  with dilaudid.   Angioedema  appeared  resolved.      Date:   11/12      IP ADMISSION   Pain  and cough persist.  Upset about not  receiving  IV  dilaudid \" when due.\"   Agrees  to   oxycodone   to decrease  need for  IV  dilaudid.  Monitor labs.   Hemoglobin   8.4  this am.  O2 sats adequate  RA.   Remains  on  TIAGO.      GI consult  Currently  being evaluated for possible fundoplication.  Has persistent heartburn, regurgitation, dysphagia, nausea and  dyspepsia despite  PPI.  Concern for  aspiration.  OP   EGD planned.   Continue current meds/carafate/reglan.    11/13  Crying, severe pain  LUQ radiating to back, pain  8-9/10.   No other symptoms.   Given IV dilaudid  with relief. Not yet ready for chest PT, not using flutter  valve.   Sputum culture with  GBS.   Continue  TIAGO.      ED Treatment-Medication Administration from 11/10/2024 1319 to 11/10/2024 2107         Date/Time Order Dose Route Action     11/10/2024 1434 Famotidine (PF) (PEPCID) injection 20 mg 20 mg Intravenous Given     11/10/2024 1435 pantoprazole (PROTONIX) injection 40 mg 40 mg Intravenous Given     11/10/2024 1541 iohexol (OMNIPAQUE) 350 MG/ML injection " (MULTI-DOSE) 100 mL 100 mL Intravenous Given     11/10/2024 1624 sodium chloride 0.9 % bolus 1,000 mL 1,000 mL Intravenous New Bag     11/10/2024 1628 acetaminophen (Ofirmev) injection 1,000 mg 1,000 mg Intravenous New Bag     11/10/2024 1625 HYDROmorphone (DILAUDID) injection 0.5 mg 0.5 mg Intravenous Given     11/10/2024 1828 ipratropium-albuterol (DUO-NEB) 0.5-2.5 mg/3 mL inhalation solution 3 mL 3 mL Nebulization Given     11/10/2024 1824 HYDROmorphone (DILAUDID) injection 0.5 mg 0.5 mg Intravenous Given     11/10/2024 2021 pregabalin (LYRICA) capsule 150 mg 150 mg Oral Given     11/10/2024 2021 apixaban (ELIQUIS) tablet 5 mg 5 mg Oral Given     11/10/2024 2032 oxyCODONE (ROXICODONE) immediate release tablet 10 mg 10 mg Oral Given     11/10/2024 2021 tacrolimus (PROGRAF) capsule 2 mg 2 mg Oral Given     11/10/2024 2021 methylPREDNISolone sodium succinate (Solu-MEDROL) injection 40 mg 40 mg Intravenous Given            Scheduled Medications:  acetaminophen, 975 mg, Oral, Q8H DOTTY  apixaban, 5 mg, Oral, BID  atovaquone, 1,500 mg, Oral, Daily  azithromycin, 250 mg, Oral, Once per day on Monday Wednesday Friday  budesonide-formoterol, 2 puff, Inhalation, Q12H  cefazolin, 2,000 mg, Intravenous, Q8H  dicyclomine, 20 mg, Oral, 4x Daily (AC & HS)  famotidine, 40 mg, Oral, HS  guaiFENesin, 1,200 mg, Oral, Q12H DOTTY  insulin glargine, 12 Units, Subcutaneous, QAM  insulin lispro, 10 Units, Subcutaneous, Daily With Breakfast  insulin lispro, 12 Units, Subcutaneous, Daily With Dinner  insulin lispro, 2-7 Units, Subcutaneous, TID AC  insulin lispro, 8 Units, Subcutaneous, Daily With Lunch  metoclopramide, 5 mg, Intravenous, Q6H DOTTY  IV  doxycycline  Q 12 hrs - d/c  11/12  nystatin, 500,000 Units, Swish & Swallow, 4x Daily  pancrelipase (Lip-Prot-Amyl), 48,000 Units, Oral, TID With Meals  pantoprazole, 40 mg, Oral, BID AC  predniSONE, 15 mg, Oral, Daily  pregabalin, 150 mg, Oral, BID  sucralfate, 1 g, Oral, TID AC  tacrolimus, 2  mg, Oral, BID  valACYclovir, 500 mg, Oral, Daily      Continuous IV Infusions:     PRN Meds:  acetaminophen, 650 mg, Oral, Q6H PRN  albuterol, 5 mg, Nebulization, Q4H PRN  ALPRAZolam, 1 mg, Oral, HS PRN  calcium carbonate, 500 mg, Oral, Daily PRN  cyclobenzaprine, 5 mg, Oral, TID PRN  diphenhydrAMINE, 25 mg, Intravenous, Q6H PRN  ( x1  11/12   X  2  11/13   and  X 3  11/14)    HYDROmorphone, 0.5 mg, Intravenous, Q4H PRN  (  X 5  11/12  and  X 1  11/13)   Dilaudid  0.2  mg  (  (  x3  11/11)    naloxone, 0.04 mg, Intravenous, Q1MIN PRN  oxyCODONE, 15 mg, Oral, Q4H PRN   And  oxyCODONE, 20 mg, Oral, Q4H PRN  pancrelipase (Lip-Prot-Amyl), 24,000 Units, Oral, TID PRN  polyethylene glycol, 17 g, Oral, Daily PRN      ED Triage Vitals   Temperature Pulse Respirations Blood Pressure SpO2 Pain Score   11/10/24 1330 11/10/24 1330 11/10/24 1330 11/10/24 1330 11/10/24 1330 11/10/24 1625   99.2 °F (37.3 °C) 83 18 113/59 97 % 8         Vital Signs (last 3 days)       Date/Time Temp Pulse Resp BP MAP (mmHg) SpO2 O2 Device Patient Position - Orthostatic VS Pain    11/15/24 0900 -- -- -- -- -- -- -- -- 8    11/15/24 07:43:03 98.4 °F (36.9 °C) 77 18 158/89 112 95 % None (Room air) -- --    11/15/24 0511 -- -- -- -- -- -- -- -- 8    11/15/24 0238 -- -- -- -- -- -- -- -- 7    11/14/24 22:30:43 97.7 °F (36.5 °C) 82 -- 147/76 100 95 % -- -- --    11/14/24 2153 -- -- -- -- -- -- -- -- Med Not Given for Pain - for MAR use only    11/14/24 2002 -- -- -- -- -- -- -- -- 7 11/14/24 14:58:13 98 °F (36.7 °C) 79 19 130/79 96 95 % -- -- --    11/14/24 1434 -- -- -- -- -- -- -- -- 7 11/14/24 1040 -- -- -- -- -- -- -- -- 7 11/14/24 0744 -- -- -- -- -- -- -- -- 7 11/14/24 07:20:21 98.7 °F (37.1 °C) 65 18 130/78 95 92 % -- -- --    11/14/24 0623 -- -- -- -- -- -- -- -- Med Not Given for Pain - for MAR use only    11/13/24 22:44:06 98.4 °F (36.9 °C) 78 -- 132/72 92 93 % -- -- --    11/13/24 2153 -- -- -- -- -- -- -- -- Med Not Given for Pain  - for MAR use only    11/13/24 1950 -- -- -- -- -- -- -- -- 4 11/13/24 1948 -- -- -- -- -- -- -- -- 7 11/13/24 15:16:59 97.7 °F (36.5 °C) 69 -- 126/72 90 94 % -- -- --    11/13/24 1401 -- -- -- -- -- -- -- -- 8 11/13/24 1359 -- -- -- -- -- -- -- -- 8 11/13/24 1109 -- -- -- -- -- -- -- -- 7 11/13/24 0815 -- -- -- -- -- -- -- -- 7 11/13/24 07:13:29 98.5 °F (36.9 °C) 69 18 116/69 85 93 % -- -- --    11/13/24 0434 -- -- -- -- -- -- -- -- 5    11/13/24 0402 -- -- -- -- -- -- -- -- 9    11/13/24 0319 98.8 °F (37.1 °C) 77 20 158/92 114 93 % None (Room air) Lying --    11/12/24 21:30:53 98.4 °F (36.9 °C) 80 20 130/80 97 96 % -- -- --    11/12/24 2130 -- -- -- -- -- -- -- -- 8 11/12/24 2100 -- -- -- -- -- -- None (Room air) -- --    11/12/24 1933 -- -- -- -- -- -- -- -- 8 11/12/24 1718 -- -- -- -- -- -- -- -- 6 11/12/24 1523 -- -- -- -- -- -- -- -- 7 11/12/24 1519 -- -- -- -- -- -- -- -- 7 11/12/24 15:07:44 97.6 °F (36.4 °C) 85 18 138/82 101 95 % -- -- --    11/12/24 1347 -- 67 18 -- -- 96 % None (Room air) -- --    11/12/24 1152 -- -- -- -- -- -- -- -- 6    11/12/24 1004 -- -- -- -- -- -- -- -- 8    11/12/24 0718 -- -- -- -- -- -- -- -- 7    11/12/24 07:13:03 98.2 °F (36.8 °C) 71 18 122/79 93 94 % -- -- --    11/12/24 04:13:03 -- 70 -- 132/71 91 94 % -- Lying --    11/12/24 0410 -- -- -- -- -- -- -- -- 8    11/12/24 0254 -- -- -- -- -- 95 % -- -- 8              Pertinent Labs/Diagnostic Test Results:   Radiology:  CT soft tissue neck with contrast   Final Interpretation by Thang Gregg MD (11/10 6459)      No neck mass or cervical adenopathy.      Pansinus mucosal disease.      Partially imaged esophageal wall thickening, refer to separately dictated CT chest for further details.      Workstation performed: [id number]         CT chest with contrast   Final Interpretation by Minor Odell MD (11/10 2093)      Small left pulmonary nodules most of which appear similar to prior exam,  however the largest a 6 mm nodule in the left upper lobe, not definitively visualized previously however may be due to differences in technique. Based on current Fleischner Society    2017 Guidelines on incidental pulmonary nodule, follow-up non-contrast CT is recommended at 6-12 months from the initial examination and, if stable at that time, an additional follow-up is recommended for 18-24 months from the initial examination.      Scattered small clusters of tree-in-bud nodules are compatible with infectious/inflammatory airways disease.      Esophageal wall thickening, similar to slightly decreased from prior exam. Findings may be secondary to persistent esophagitis.               Workstation performed: [id number]         XR chest 1 view portable   ED Interpretation by Jo Rondon DO (11/10 1429)   Unchanged when compared to previous chest x-ray.  Right hemithorax is lulu out, and the patient's heart is rotated to the right.      Final Interpretation by Nishant Saucedo MD (11/11 0832)      No acute cardiopulmonary process.            Resident: Dhaval Nice I, the attending radiologist, have reviewed the images and agree with the final report above.      Workstation performed: [id number]           Cardiology:  ECG 12 lead   Final Result by Singh Romero MD (11/11 1120)   Normal sinus rhythm   Septal infarct , age undetermined   Abnormal ECG   Confirmed by Singh Romero ([id number]) on 11/11/2024 11:20:02 AM              Results from last 7 days   Lab Units 11/15/24  0555 11/13/24  0355 11/12/24  0520 11/11/24  0858 11/10/24  1411   WBC Thousand/uL 4.60 5.04 5.31 7.18 6.87   HEMOGLOBIN g/dL 9.2* 8.5* 8.4* 8.7* 9.3*   HEMATOCRIT % 32.0* 30.5* 29.4* 29.8* 31.9*   PLATELETS Thousands/uL 89* 127* 112* 132* 138*   TOTAL NEUT ABS Thousands/µL  --  3.10 3.28  --  4.40         Results from last 7 days   Lab Units 11/15/24  0555 11/13/24 0355 11/12/24  0520 11/11/24  0858 11/10/24  1411   SODIUM mmol/L 138  135 134* 133* 143   POTASSIUM mmol/L 4.7 5.2 5.1 5.1 4.1   CHLORIDE mmol/L 105 102 101 98 100   CO2 mmol/L 28 27 29 29 37*   ANION GAP mmol/L 5 6 4 6 6   BUN mg/dL 31* 20 21 20 21   CREATININE mg/dL 0.86 1.02 0.93 1.05 1.22   EGFR ml/min/1.73sq m 81 66 73 63 53   CALCIUM mg/dL 8.6 8.0* 8.0* 7.9* 7.7*   MAGNESIUM mg/dL 1.6* 1.5*  --   --   --      Results from last 7 days   Lab Units 11/12/24  0520 11/11/24  0858 11/10/24  1411   AST U/L 14 18 19   ALT U/L 19 23 22   ALK PHOS U/L 63 68 65   TOTAL PROTEIN g/dL 5.3* 5.4* 5.4*   ALBUMIN g/dL 2.9* 2.8* 2.9*   TOTAL BILIRUBIN mg/dL 0.27 0.33 0.35     Results from last 7 days   Lab Units 11/15/24  0622 11/14/24  2051 11/14/24  1608 11/14/24  1046 11/14/24  0634 11/13/24  1611 11/13/24  1051 11/13/24  0642 11/12/24  1636 11/12/24  1632 11/12/24  1036 11/12/24  0624   POC GLUCOSE mg/dl 115 208* 106 140 139 237* 93 267* 76 49* 107 275*     Results from last 7 days   Lab Units 11/15/24  0555 11/13/24  0355 11/12/24  0520 11/11/24  0858 11/10/24  1411   GLUCOSE RANDOM mg/dL 131 294* 281* 257* 78             Beta- Hydroxybutyrate   Date Value Ref Range Status   10/21/2024 0.27 0.02 - 0.27 mmol/L Final   05/21/2024 0.06 0.02 - 0.27 mmol/L Final                      Results from last 7 days   Lab Units 11/10/24  1835 11/10/24  1624 11/10/24  1411   HS TNI 0HR ng/L  --   --  11   HS TNI 2HR ng/L  --  9  --    HSTNI D2 ng/L  --  -2  --    HS TNI 4HR ng/L 10  --   --    HSTNI D4 ng/L -1  --   --                  Results from last 7 days   Lab Units 11/13/24  0355 11/12/24  0520   PROCALCITONIN ng/ml 4.33* 9.85*           Results from last 7 days   Lab Units 11/11/24  1205   SPUTUM CULTURE  4+ Growth of Streptococcus agalactiae (Group B)*  4+ Growth of  4+ Growth of Candida albicans*   GRAM STAIN RESULT  1+ Epithelial cells per low power field*  3+ Gram positive cocci in pairs*  No polys seen*                   Present on Admission:   GERD (gastroesophageal reflux disease)    History of pulmonary embolus (PE)   Type 1 diabetes mellitus with diabetic chronic kidney disease, unspecified CKD stage (HCC)   Chronic pain disorder   Cystic fibrosis (HCC)      Admitting Diagnosis: Facial swelling [R22.0]  Productive cough [R05.8]  Immunosuppression (HCC) [D84.9]  Hx of lung transplant (HCC) [Z94.2]  Angioedema, initial encounter [T78.3XXA]  Age/Sex: 48 y.o. female    Network Utilization Review Department  ATTENTION: Please call with any questions or concerns to [phone] and carefully listen to the prompts so that you are directed to the right person. All voicemails are confidential.   For Discharge needs, contact Care Management DC Support Team at [phone] opt. [phone]  Send all requests for admission clinical reviews, approved or denied determinations and any other requests to dedicated fax number below belonging to the campus where the patient is receiving treatment. List of dedicated fax numbers for the Facilities:  FACILITY NAME UR FAX NUMBER   ADMISSION DENIALS (Administrative/Medical Necessity) [phone]   DISCHARGE SUPPORT TEAM (NETWORK) [phone]   PARENT CHILD HEALTH (Maternity/NICU/Pediatrics) [phone]   [facility] [phone]   [facility] [phone]   [facility] [phone]   [facility] [phone]   [facility] [phone]   [facility] [phone]   [facility] [phone]   [facility] [phone]   [facility] [phone]   [facility] [phone]   [facility] [phone]   [facility] [phone]

## 2024-11-15 NOTE — PLAN OF CARE
Problem: Potential for Falls  Goal: Patient will remain free of falls  Description: INTERVENTIONS:  - Educate patient/family on patient safety including physical limitations  - Instruct patient to call for assistance with activity   - Consult OT/PT to assist with strengthening/mobility   - Keep Call bell within reach  - Keep bed low and locked with side rails adjusted as appropriate  - Keep care items and personal belongings within reach  - Initiate and maintain comfort rounds  - Make Fall Risk Sign visible to staff  - Offer Toileting every 2 Hours, in advance of need  - Initiate/Maintain bed alarm  - Obtain necessary fall risk management equipment:   - Apply yellow socks and bracelet for high fall risk patients  - Consider moving patient to room near nurses station  Outcome: Progressing     Problem: PAIN - ADULT  Goal: Verbalizes/displays adequate comfort level or baseline comfort level  Description: Interventions:  - Encourage patient to monitor pain and request assistance  - Assess pain using appropriate pain scale  - Administer analgesics based on type and severity of pain and evaluate response  - Implement non-pharmacological measures as appropriate and evaluate response  - Consider cultural and social influences on pain and pain management  - Notify physician/advanced practitioner if interventions unsuccessful or patient reports new pain  Outcome: Progressing     Problem: INFECTION - ADULT  Goal: Absence or prevention of progression during hospitalization  Description: INTERVENTIONS:  - Assess and monitor for signs and symptoms of infection  - Monitor lab/diagnostic results  - Monitor all insertion sites, i.e. indwelling lines, tubes, and drains  - Monitor endotracheal if appropriate and nasal secretions for changes in amount and color  - [place] appropriate cooling/warming therapies per order  - Administer medications as ordered  - Instruct and encourage patient and family to use good hand hygiene  technique  - Identify and instruct in appropriate isolation precautions for identified infection/condition  Outcome: Progressing  Goal: Absence of fever/infection during neutropenic period  Description: INTERVENTIONS:  - Monitor WBC    Outcome: Progressing     Problem: SAFETY ADULT  Goal: Patient will remain free of falls  Description: INTERVENTIONS:  - Educate patient/family on patient safety including physical limitations  - Instruct patient to call for assistance with activity   - Consult OT/PT to assist with strengthening/mobility   - Keep Call bell within reach  - Keep bed low and locked with side rails adjusted as appropriate  - Keep care items and personal belongings within reach  - Initiate and maintain comfort rounds  - Make Fall Risk Sign visible to staff  - Offer Toileting every 2 Hours, in advance of need  - Initiate/Maintain bed alarm  - Obtain necessary fall risk management equipment:   - Apply yellow socks and bracelet for high fall risk patients  - Consider moving patient to room near nurses station  Outcome: Progressing  Goal: Maintain or return to baseline ADL function  Description: INTERVENTIONS:  -  Assess patient's ability to carry out ADLs; assess patient's baseline for ADL function and identify physical deficits which impact ability to perform ADLs (bathing, care of mouth/teeth, toileting, grooming, dressing, etc.)  - Assess/evaluate cause of self-care deficits   - Assess range of motion  - Assess patient's mobility; develop plan if impaired  - Assess patient's need for assistive devices and provide as appropriate  - Encourage maximum independence but intervene and supervise when necessary  - Involve family in performance of ADLs  - Assess for home care needs following discharge   - Consider OT consult to assist with ADL evaluation and planning for discharge  - Provide patient education as appropriate  Outcome: Progressing  Goal: Maintains/Returns to pre admission functional  level  Description: INTERVENTIONS:  - Perform AM-PAC 6 Click Basic Mobility/ Daily Activity assessment daily.  - Set and communicate daily mobility goal to care team and patient/family/caregiver.   - Collaborate with rehabilitation services on mobility goals if consulted  - Perform Range of Motion 2 times a day.  - Reposition patient every 2 hours.  - Dangle patient 2 times a day  - Stand patient 2 times a day  - Ambulate patient 2 times a day  - Out of bed to chair 2 times a day   - Out of bed for meals 2 times a day  - Out of bed for toileting  - Record patient progress and toleration of activity level   Outcome: Progressing     Problem: RESPIRATORY - ADULT  Goal: Achieves optimal ventilation and oxygenation  Description: INTERVENTIONS:  - Assess for changes in respiratory status  - Assess for changes in mentation and behavior  - Position to facilitate oxygenation and minimize respiratory effort  - Oxygen administered by appropriate delivery if ordered  - Initiate smoking cessation education as indicated  - Encourage broncho-pulmonary hygiene including cough, deep breathe, Incentive Spirometry  - Assess the need for suctioning and aspirate as needed  - Assess and instruct to report SOB or any respiratory difficulty  - Respiratory Therapy support as indicated  Outcome: Progressing

## 2024-11-16 ENCOUNTER — APPOINTMENT (INPATIENT)
Dept: CT IMAGING | Facility: HOSPITAL | Age: 49
DRG: 915 | End: 2024-11-16
Payer: COMMERCIAL

## 2024-11-16 PROBLEM — R84.5 POSITIVE CULTURE FINDINGS IN SPUTUM: Status: ACTIVE | Noted: 2024-11-16

## 2024-11-16 LAB
ANION GAP SERPL CALCULATED.3IONS-SCNC: 4 MMOL/L (ref 4–13)
BUN SERPL-MCNC: 36 MG/DL (ref 5–25)
CALCIUM SERPL-MCNC: 8.7 MG/DL (ref 8.4–10.2)
CHLORIDE SERPL-SCNC: 107 MMOL/L (ref 96–108)
CO2 SERPL-SCNC: 25 MMOL/L (ref 21–32)
CREAT SERPL-MCNC: 0.92 MG/DL (ref 0.6–1.3)
ERYTHROCYTE [DISTWIDTH] IN BLOOD BY AUTOMATED COUNT: 17.2 % (ref 11.6–15.1)
GFR SERPL CREATININE-BSD FRML MDRD: 74 ML/MIN/1.73SQ M
GLUCOSE SERPL-MCNC: 131 MG/DL (ref 65–140)
GLUCOSE SERPL-MCNC: 209 MG/DL (ref 65–140)
GLUCOSE SERPL-MCNC: 275 MG/DL (ref 65–140)
GLUCOSE SERPL-MCNC: 296 MG/DL (ref 65–140)
GLUCOSE SERPL-MCNC: 376 MG/DL (ref 65–140)
GLUCOSE SERPL-MCNC: 57 MG/DL (ref 65–140)
HCT VFR BLD AUTO: 33.7 % (ref 34.8–46.1)
HGB BLD-MCNC: 9.5 G/DL (ref 11.5–15.4)
MCH RBC QN AUTO: 26.8 PG (ref 26.8–34.3)
MCHC RBC AUTO-ENTMCNC: 28.2 G/DL (ref 31.4–37.4)
MCV RBC AUTO: 95 FL (ref 82–98)
PLATELET # BLD AUTO: 133 THOUSANDS/UL (ref 149–390)
PMV BLD AUTO: 10.3 FL (ref 8.9–12.7)
POTASSIUM SERPL-SCNC: 5 MMOL/L (ref 3.5–5.3)
PROCALCITONIN SERPL-MCNC: 0.42 NG/ML
RBC # BLD AUTO: 3.54 MILLION/UL (ref 3.81–5.12)
SODIUM SERPL-SCNC: 136 MMOL/L (ref 135–147)
WBC # BLD AUTO: 9.18 THOUSAND/UL (ref 4.31–10.16)

## 2024-11-16 PROCEDURE — 99255 IP/OBS CONSLTJ NEW/EST HI 80: CPT | Performed by: INTERNAL MEDICINE

## 2024-11-16 PROCEDURE — 82948 REAGENT STRIP/BLOOD GLUCOSE: CPT

## 2024-11-16 PROCEDURE — 99232 SBSQ HOSP IP/OBS MODERATE 35: CPT

## 2024-11-16 PROCEDURE — 94640 AIRWAY INHALATION TREATMENT: CPT

## 2024-11-16 PROCEDURE — 94760 N-INVAS EAR/PLS OXIMETRY 1: CPT

## 2024-11-16 PROCEDURE — 84145 PROCALCITONIN (PCT): CPT | Performed by: INTERNAL MEDICINE

## 2024-11-16 PROCEDURE — 80048 BASIC METABOLIC PNL TOTAL CA: CPT

## 2024-11-16 PROCEDURE — 71250 CT THORAX DX C-: CPT

## 2024-11-16 PROCEDURE — 85027 COMPLETE CBC AUTOMATED: CPT

## 2024-11-16 PROCEDURE — 94664 DEMO&/EVAL PT USE INHALER: CPT

## 2024-11-16 RX ADMIN — DICYCLOMINE HYDROCHLORIDE 20 MG: 10 CAPSULE ORAL at 06:27

## 2024-11-16 RX ADMIN — CYCLOBENZAPRINE HYDROCHLORIDE 5 MG: 10 TABLET, FILM COATED ORAL at 20:25

## 2024-11-16 RX ADMIN — SUCRALFATE 1 G: 1 TABLET ORAL at 16:16

## 2024-11-16 RX ADMIN — OXYCODONE HYDROCHLORIDE 20 MG: 10 TABLET ORAL at 20:25

## 2024-11-16 RX ADMIN — PREGABALIN 150 MG: 75 CAPSULE ORAL at 08:59

## 2024-11-16 RX ADMIN — NYSTATIN 500000 UNITS: 100000 SUSPENSION ORAL at 22:33

## 2024-11-16 RX ADMIN — PANTOPRAZOLE SODIUM 40 MG: 40 TABLET, DELAYED RELEASE ORAL at 16:16

## 2024-11-16 RX ADMIN — NYSTATIN 500000 UNITS: 100000 SUSPENSION ORAL at 11:13

## 2024-11-16 RX ADMIN — INSULIN LISPRO 10 UNITS: 100 INJECTION, SOLUTION INTRAVENOUS; SUBCUTANEOUS at 18:15

## 2024-11-16 RX ADMIN — VALACYCLOVIR HYDROCHLORIDE 500 MG: 500 TABLET, FILM COATED ORAL at 08:59

## 2024-11-16 RX ADMIN — OXYCODONE HYDROCHLORIDE 20 MG: 10 TABLET ORAL at 06:26

## 2024-11-16 RX ADMIN — BUDESONIDE AND FORMOTEROL FUMARATE DIHYDRATE 2 PUFF: 160; 4.5 AEROSOL RESPIRATORY (INHALATION) at 09:02

## 2024-11-16 RX ADMIN — CEFTRIAXONE SODIUM 1000 MG: 10 INJECTION, POWDER, FOR SOLUTION INTRAVENOUS at 18:03

## 2024-11-16 RX ADMIN — METOCLOPRAMIDE 5 MG: 5 INJECTION, SOLUTION INTRAMUSCULAR; INTRAVENOUS at 11:09

## 2024-11-16 RX ADMIN — TACROLIMUS 2 MG: 0.5 CAPSULE ORAL at 18:02

## 2024-11-16 RX ADMIN — ACETAMINOPHEN 975 MG: 325 TABLET, FILM COATED ORAL at 20:25

## 2024-11-16 RX ADMIN — APIXABAN 5 MG: 5 TABLET, FILM COATED ORAL at 08:59

## 2024-11-16 RX ADMIN — HYDROMORPHONE HYDROCHLORIDE 0.5 MG: 1 INJECTION, SOLUTION INTRAMUSCULAR; INTRAVENOUS; SUBCUTANEOUS at 17:59

## 2024-11-16 RX ADMIN — HYDROMORPHONE HYDROCHLORIDE 0.5 MG: 1 INJECTION, SOLUTION INTRAMUSCULAR; INTRAVENOUS; SUBCUTANEOUS at 13:11

## 2024-11-16 RX ADMIN — BUDESONIDE AND FORMOTEROL FUMARATE DIHYDRATE 2 PUFF: 160; 4.5 AEROSOL RESPIRATORY (INHALATION) at 20:25

## 2024-11-16 RX ADMIN — DICYCLOMINE HYDROCHLORIDE 20 MG: 10 CAPSULE ORAL at 16:16

## 2024-11-16 RX ADMIN — METOCLOPRAMIDE 5 MG: 5 INJECTION, SOLUTION INTRAMUSCULAR; INTRAVENOUS at 00:01

## 2024-11-16 RX ADMIN — INSULIN LISPRO 10 UNITS: 100 INJECTION, SOLUTION INTRAVENOUS; SUBCUTANEOUS at 09:04

## 2024-11-16 RX ADMIN — INSULIN LISPRO 4 UNITS: 100 INJECTION, SOLUTION INTRAVENOUS; SUBCUTANEOUS at 16:18

## 2024-11-16 RX ADMIN — SUCRALFATE 1 G: 1 TABLET ORAL at 10:47

## 2024-11-16 RX ADMIN — PANTOPRAZOLE SODIUM 40 MG: 40 TABLET, DELAYED RELEASE ORAL at 06:27

## 2024-11-16 RX ADMIN — NYSTATIN 500000 UNITS: 100000 SUSPENSION ORAL at 18:02

## 2024-11-16 RX ADMIN — PREDNISONE 15 MG: 5 TABLET ORAL at 08:59

## 2024-11-16 RX ADMIN — NYSTATIN 500000 UNITS: 100000 SUSPENSION ORAL at 08:58

## 2024-11-16 RX ADMIN — PANCRELIPASE 48000 UNITS: 120000; 24000; 76000 CAPSULE, DELAYED RELEASE PELLETS ORAL at 11:13

## 2024-11-16 RX ADMIN — APIXABAN 5 MG: 5 TABLET, FILM COATED ORAL at 18:02

## 2024-11-16 RX ADMIN — METOCLOPRAMIDE 5 MG: 5 INJECTION, SOLUTION INTRAMUSCULAR; INTRAVENOUS at 06:27

## 2024-11-16 RX ADMIN — INSULIN GLARGINE 12 UNITS: 100 INJECTION, SOLUTION SUBCUTANEOUS at 08:59

## 2024-11-16 RX ADMIN — GUAIFENESIN 1200 MG: 600 TABLET ORAL at 08:59

## 2024-11-16 RX ADMIN — PANCRELIPASE 48000 UNITS: 120000; 24000; 76000 CAPSULE, DELAYED RELEASE PELLETS ORAL at 16:15

## 2024-11-16 RX ADMIN — ALTEPLASE 2 MG: 2.2 INJECTION, POWDER, LYOPHILIZED, FOR SOLUTION INTRAVENOUS at 13:11

## 2024-11-16 RX ADMIN — FAMOTIDINE 40 MG: 20 TABLET, FILM COATED ORAL at 20:25

## 2024-11-16 RX ADMIN — ALBUTEROL SULFATE 5 MG: 2.5 SOLUTION RESPIRATORY (INHALATION) at 14:10

## 2024-11-16 RX ADMIN — HYDROMORPHONE HYDROCHLORIDE 0.5 MG: 1 INJECTION, SOLUTION INTRAMUSCULAR; INTRAVENOUS; SUBCUTANEOUS at 08:56

## 2024-11-16 RX ADMIN — OXYCODONE HYDROCHLORIDE 20 MG: 10 TABLET ORAL at 00:01

## 2024-11-16 RX ADMIN — HYDROMORPHONE HYDROCHLORIDE 0.5 MG: 1 INJECTION, SOLUTION INTRAMUSCULAR; INTRAVENOUS; SUBCUTANEOUS at 22:29

## 2024-11-16 RX ADMIN — SUCRALFATE 1 G: 1 TABLET ORAL at 06:27

## 2024-11-16 RX ADMIN — CALCIUM CARBONATE (ANTACID) CHEW TAB 500 MG 500 MG: 500 CHEW TAB at 04:15

## 2024-11-16 RX ADMIN — OXYCODONE HYDROCHLORIDE 15 MG: 10 TABLET ORAL at 10:47

## 2024-11-16 RX ADMIN — POLYETHYLENE GLYCOL 3350 17 G: 17 POWDER, FOR SOLUTION ORAL at 20:35

## 2024-11-16 RX ADMIN — OXYCODONE HYDROCHLORIDE 15 MG: 10 TABLET ORAL at 16:15

## 2024-11-16 RX ADMIN — HYDROMORPHONE HYDROCHLORIDE 0.5 MG: 1 INJECTION, SOLUTION INTRAMUSCULAR; INTRAVENOUS; SUBCUTANEOUS at 04:15

## 2024-11-16 RX ADMIN — PREGABALIN 150 MG: 75 CAPSULE ORAL at 18:02

## 2024-11-16 RX ADMIN — GUAIFENESIN 1200 MG: 600 TABLET ORAL at 20:25

## 2024-11-16 RX ADMIN — TACROLIMUS 2 MG: 0.5 CAPSULE ORAL at 08:59

## 2024-11-16 RX ADMIN — PANCRELIPASE 48000 UNITS: 120000; 24000; 76000 CAPSULE, DELAYED RELEASE PELLETS ORAL at 08:59

## 2024-11-16 RX ADMIN — ACETAMINOPHEN 975 MG: 325 TABLET, FILM COATED ORAL at 06:27

## 2024-11-16 RX ADMIN — ATOVAQUONE 1500 MG: 750 SUSPENSION ORAL at 08:58

## 2024-11-16 RX ADMIN — DICYCLOMINE HYDROCHLORIDE 20 MG: 10 CAPSULE ORAL at 10:47

## 2024-11-16 RX ADMIN — ACETAMINOPHEN 975 MG: 325 TABLET, FILM COATED ORAL at 13:11

## 2024-11-16 NOTE — ASSESSMENT & PLAN NOTE
Patient previously underwent double lung transplant in 2013.  She required retransplant of the left lung in 2017.  Course was then complicated by need for right-sided pneumonectomy in 2022 given chronic progressive infections.  She remains on regimen with tacrolimus and prednisone.  Prophylactic medications and dosing reviewed.  Continue chronic atovaquone, Valtrex and azithromycin  Continue immunosuppressive therapy otherwise  Monitor closely for any drug drug interactions  Close follow-up with transplant center

## 2024-11-16 NOTE — PROGRESS NOTES
"Progress Note - Hospitalist   Name: Maximilian Mann 48 y.o. female I MRN: [id number]  Unit/Bed#: -01 I Date of Admission: 11/10/2024   Date of Service: 11/16/2024 I Hospital Day: 4    Assessment & Plan  Cystic fibrosis ([facility])  Came in for facial swelling, this is resolved. History of cystic fibrosis s/p 2 previous lung transplant with chronic rejection on tacrolimus/prednisone. Patient reports she is \"end-stage\" at this point, and not a candidate for another transplant  Continue chronic Mepron/Zithromax/Valtrex  11/11 spoke to Frances from [facility] transplant team, low threshold to treat for superimposed infection  Discussed again with Frances Camarillo (11/15) from [facility], initially recommending 14 days IV abx, however now agreeable ID recommendations  Procal 9.85 with cough productive of green sputum and ongoing epigastric/substernal pain  Patient remains afebrile without leukocytosis  Sputum culture +GBS, abx transitioned to IV Rocephin  Infectious disease consulted, appreciate input  Repeat procal, CT scan, if stable may complete 7 days abx to end on 11/18 if abnormal may extended to 10 day abx course with anticipation to transition to amoxicillin    Chronic pain disorder  Maintained chronically on oral oxycodone 10 mg every 4 hours as needed.  Increased to 15 mg or 20 mg for moderate to severe pain while hospitalized  IV Dilaudid for breakthrough  Aqua K  Patient appears very comfortable but is still requesting additional pain medication for costochondritis like symptoms education and emotional support provided  Avoid increase in pain medication if at all possible  Hx of lung transplant ([facility])  Transplant x2, now with only left functioning lung and chronic rejection on tacrolimus/prednisone.  History of pulmonary embolus (PE)  Continue Eliquis.  Type 1 diabetes mellitus with diabetic chronic kidney disease, unspecified CKD stage ([facility])  Blood Sugar Average: Last 72 hrs:(P) [id number]  Lantus 12 " units nightly ordered, 8u with meals  Sliding scale coverage with ACHS checks  Consistent carb diet  Hypoglycemia protocol  GERD (gastroesophageal reflux disease)  Continue Pepcid, add pantoprazole and carafate due to esophagitis on imaging. Can trial bentyl as well  Is due to EGD as well as Bravo pH test  Positive culture findings in sputum      VTE Pharmacologic Prophylaxis: VTE Score: 1 Low Risk (Score 0-2) - Encourage Ambulation.    Mobility:   Basic Mobility Inpatient Raw Score: 24  JH-HLM Goal: 8: Walk 250 feet or more  JH-HLM Achieved: 6: Walk 10 steps or more  JH-HLM Goal NOT achieved. Continue with multidisciplinary rounding and encourage appropriate mobility to improve upon JH-HLM goals.    Patient Centered Rounds: I performed bedside rounds with nursing staff today.   Discussions with Specialists or Other Care Team Provider:   Infectious Disease    Education and Discussions with Family / Patient: Patient declined call to .     Current Length of Stay: 4 day(s)  Current Patient Status: Inpatient   Certification Statement: The patient will continue to require additional inpatient hospital stay due to need for IV antibiotics  Discharge Plan: Anticipate discharge in 24-48 hrs to home.    Code Status: Level 1 - Full Code    Subjective   Patient seen and examined sitting up in chair. Reports pain is well controlled. Agreeable to current plan of care    Objective :  Temp:  [97.9 °F (36.6 °C)-98.9 °F (37.2 °C)] 98.3 °F (36.8 °C)  HR:  [72-85] 72  BP: (109-136)/(74-88) 109/74  Resp:  [16] 16  SpO2:  [92 %-94 %] 92 %  O2 Device: None (Room air)    Body mass index is 21.03 kg/m².     Input and Output Summary (last 24 hours):     Intake/Output Summary (Last 24 hours) at 11/16/2024 1312  Last data filed at 11/16/2024 1201  Gross per 24 hour   Intake 960 ml   Output --   Net 960 ml       Physical Exam  Vitals and nursing note reviewed.   Constitutional:       General: She is not in acute distress.  HENT:       Head: Normocephalic.      Mouth/Throat:      Mouth: Mucous membranes are moist.   Eyes:      Conjunctiva/sclera: Conjunctivae normal.   Cardiovascular:      Rate and Rhythm: Normal rate and regular rhythm.      Pulses: Normal pulses.      Heart sounds: Normal heart sounds.   Pulmonary:      Effort: Pulmonary effort is normal.      Breath sounds: Rhonchi present.   Abdominal:      General: There is no distension.      Palpations: Abdomen is soft.      Tenderness: There is no abdominal tenderness.   Musculoskeletal:         General: No swelling.   Skin:     General: Skin is warm.      Capillary Refill: Capillary refill takes less than 2 seconds.   Neurological:      Mental Status: She is alert and oriented to person, place, and time.   Psychiatric:         Mood and Affect: Mood normal.         Behavior: Behavior normal.         Thought Content: Thought content normal.           Lines/Drains:  Lines/Drains/Airways       Active Status       Name Placement date Placement time Site Days    Port A Cath 11/25/22 Right Chest 11/25/22  0953  Chest  722                    Central Line:  Goal for removal: Port accessed. Will de-access as appropriate.               Lab Results: I have reviewed the following results:   Results from last 7 days   Lab Units 11/15/24  0555 11/13/24  0355   WBC Thousand/uL 4.60 5.04   HEMOGLOBIN g/dL 9.2* 8.5*   HEMATOCRIT % 32.0* 30.5*   PLATELETS Thousands/uL 89* 127*   SEGS PCT %  --  62   LYMPHO PCT %  --  25   MONO PCT %  --  12   EOS PCT %  --  1     Results from last 7 days   Lab Units 11/15/24  0555 11/13/24  0355 11/12/24  0520   SODIUM mmol/L 138   < > 134*   POTASSIUM mmol/L 4.7   < > 5.1   CHLORIDE mmol/L 105   < > 101   CO2 mmol/L 28   < > 29   BUN mg/dL 31*   < > 21   CREATININE mg/dL 0.86   < > 0.93   ANION GAP mmol/L 5   < > 4   CALCIUM mg/dL 8.6   < > 8.0*   ALBUMIN g/dL  --   --  2.9*   TOTAL BILIRUBIN mg/dL  --   --  0.27   ALK PHOS U/L  --   --  63   ALT U/L  --   --  19    AST U/L  --   --  14   GLUCOSE RANDOM mg/dL 131   < > 281*    < > = values in this interval not displayed.         Results from last 7 days   Lab Units 11/16/24  1103 11/16/24  0617 11/15/24  2004 11/15/24  1536 11/15/24  1126 11/15/24  0622 11/14/24  2051 11/14/24  1608 11/14/24  1046 11/14/24  0634 11/13/24  1611 11/13/24  1051   POC GLUCOSE mg/dl 57* 209* 108 223* 229* 115 208* 106 140 139 237* 93         Results from last 7 days   Lab Units 11/13/24  0355 11/12/24  0520   PROCALCITONIN ng/ml 4.33* 9.85*       Recent Cultures (last 7 days):   Results from last 7 days   Lab Units 11/11/24  1205   SPUTUM CULTURE  4+ Growth of Streptococcus agalactiae (Group B)*  4+ Growth of  4+ Growth of Candida albicans*   GRAM STAIN RESULT  1+ Epithelial cells per low power field*  3+ Gram positive cocci in pairs*  No polys seen*       Imaging Results Review: No pertinent imaging studies reviewed.  Other Study Results Review: No additional pertinent studies reviewed.    Last 24 Hours Medication List:     Current Facility-Administered Medications:     acetaminophen (TYLENOL) tablet 650 mg, Q6H PRN    acetaminophen (TYLENOL) tablet 975 mg, Q8H DOTTY    albuterol inhalation solution 5 mg, Q4H PRN    ALPRAZolam (XANAX) tablet 1 mg, HS PRN    apixaban (ELIQUIS) tablet 5 mg, BID    atovaquone (MEPRON) oral suspension 1,500 mg, Daily    azithromycin (ZITHROMAX) tablet 250 mg, Once per day on Monday Wednesday Friday    budesonide-formoterol (SYMBICORT) 160-4.5 mcg/act inhaler 2 puff, Q12H    calcium carbonate (TUMS) chewable tablet 500 mg, Daily PRN    cefTRIAXone (ROCEPHIN) 1,000 mg in dextrose 5 % 50 mL IVPB, Q24H, Last Rate: 1,000 mg (11/15/24 1826)    cyclobenzaprine (FLEXERIL) tablet 5 mg, TID PRN    dicyclomine (BENTYL) capsule 20 mg, 4x Daily (AC & HS)    diphenhydrAMINE (BENADRYL) injection 25 mg, Q6H PRN    famotidine (PEPCID) tablet 40 mg, HS    guaiFENesin (MUCINEX) 12 hr tablet 1,200 mg, Q12H DOTTY    HYDROmorphone  (DILAUDID) injection 0.5 mg, Q4H PRN    insulin glargine (LANTUS) subcutaneous injection 12 Units 0.12 mL, QAM    insulin lispro (HumALOG/ADMELOG) 100 units/mL subcutaneous injection 10 Units, Daily With Breakfast    insulin lispro (HumALOG/ADMELOG) 100 units/mL subcutaneous injection 12 Units, Daily With Dinner    insulin lispro (HumALOG/ADMELOG) 100 units/mL subcutaneous injection 2-7 Units, TID AC **AND** Fingerstick Glucose (POCT), TID AC    insulin lispro (HumALOG/ADMELOG) 100 units/mL subcutaneous injection 8 Units, Daily With Lunch    metoclopramide (REGLAN) injection 5 mg, Q6H DOTTY    naloxone (NARCAN) 0.04 mg/mL syringe 0.04 mg, Q1MIN PRN    nystatin (MYCOSTATIN) oral suspension 500,000 Units, 4x Daily    oxyCODONE (ROXICODONE) IR tablet 15 mg, Q4H PRN **AND** oxyCODONE (ROXICODONE) immediate release tablet 20 mg, Q4H PRN    pancrelipase (Lip-Prot-Amyl) (CREON) delayed release capsule 24,000 Units, TID PRN    pancrelipase (Lip-Prot-Amyl) (CREON) delayed release capsule 48,000 Units, TID With Meals    pantoprazole (PROTONIX) EC tablet 40 mg, BID AC    polyethylene glycol (MIRALAX) packet 17 g, Daily PRN    predniSONE tablet 15 mg, Daily    pregabalin (LYRICA) capsule 150 mg, BID    sucralfate (CARAFATE) tablet 1 g, TID AC    tacrolimus (PROGRAF) capsule 2 mg, BID    valACYclovir (VALTREX) tablet 500 mg, Daily    Administrative Statements   Today, Patient Was Seen By: WAYNE Templeton  I have spent a total time of 32 minutes in caring for this patient on the day of the visit/encounter including Diagnostic results, Risks and benefits of tx options, Patient and family education, Importance of tx compliance, Risk factor reductions, Impressions, Counseling / Coordination of care, Documenting in the medical record, Reviewing / ordering tests, medicine, procedures  , Obtaining or reviewing history  , and Communicating with other healthcare professionals .    **Please Note: This note may have been  constructed using a voice recognition system.**

## 2024-11-16 NOTE — ASSESSMENT & PLAN NOTE
Blood Sugar Average: Last 72 hrs:(P) [id number]  Lantus 12 units nightly ordered, 8u with meals  Sliding scale coverage with ACHS checks  Consistent carb diet  Hypoglycemia protocol

## 2024-11-16 NOTE — RESPIRATORY THERAPY NOTE
RT Protocol Note  Ivaramses Mann 48 y.o. female MRN: [id number]  Unit/Bed#: -01 Encounter: [id number]    Assessment    Principal Problem:    Cystic fibrosis (HCC)  Active Problems:    Type 1 diabetes mellitus with diabetic chronic kidney disease, unspecified CKD stage (HCC)    Chronic pain disorder    GERD (gastroesophageal reflux disease)    Hx of lung transplant (HCC)    History of pulmonary embolus (PE)    Positive culture findings in sputum      Home Pulmonary Medications:    Home Devices/Therapy:  (flutter)    Past Medical History:   Diagnosis Date    Abnormal uterine bleeding (AUB) 7/7/2022    ABPA (allergic bronchopulmonary aspergillosis) (HCC) 07/08/2012    Acute kidney injury (HCC) 09/08/2017    Acute on chronic respiratory failure (HCC) 03/04/2017    Last Assessment & Plan:  Formatting of this note might be different from the original. Recurrent with clinical worsening with enlarging PTX, s/p multiple chest tubes, but new fevers 6/19 and worsening leukocytosis indicating infection as cause of 6/19 decompensation with profound hypoxia in setting of shunting through right lung and continued pneumothorax s/p 2nd chest tube placement.  6/27: Right    Anaphylaxis 06/06/2022    Anemia     Asthma     Chest pain 09/21/2021    Chronic pain     Chronic respiratory failure with hypoxia (HCC) 09/07/2021    COVID-19 06/03/2022    Cystic fibrosis (HCC)     Deep vein thrombosis (HCC)     Diabetes mellitus (HCC)     Fever 09/22/2024    GERD (gastroesophageal reflux disease)     HBP (high blood pressure)     HCAP (healthcare-associated pneumonia) 03/06/2020    History of transfusion     Influenza B 02/23/2020    Kidney stone     Moderate protein-calorie malnutrition (HCC) 01/08/2022    Neutropenic fever  (HCC) 09/26/2020    Pneumonia due to Pseudomonas species ([facility]) 10/28/2010    Last Assessment & Plan:  Formatting of this note might be different from the original. Polymicrobial pna w/ pseudomonas, M.  Abscessus, Cadida glabrata/albicans    PONV (postoperative nausea and vomiting)     Pseudomonas aeruginosa infection 2012    Renal calculus     Squamous cell skin cancer 2023    SCCIS- Left upper thigh    Transplant recipient     Lung- 2017    Ulcerative colitis (HCC)     Wellness examination 2013    Formatting of this note might be different from the original. Discussed with patient and acknowledgement form signed on 2013 (ALYSHA)     Social History     Socioeconomic History    Marital status: /Civil Union     Spouse name: None    Number of children: None    Years of education: 12    Highest education level: None   Occupational History    Occupation: Disabled    Tobacco Use    Smoking status: Former     Current packs/day: 0.00     Average packs/day: 0.5 packs/day for 12.0 years (6.0 ttl pk-yrs)     Types: Cigarettes     Start date: 1993     Quit date: 2005     Years since quittin.8     Passive exposure: Past    Smokeless tobacco: Never   Vaping Use    Vaping status: Never Used   Substance and Sexual Activity    Alcohol use: Yes     Alcohol/week: 2.0 standard drinks of alcohol     Types: 2 Glasses of wine per week     Comment: social    Drug use: Yes     Frequency: 2.0 times per week     Types: Marijuana     Comment: medical edible marijuana prescribed    Sexual activity: Yes     Partners: Male     Birth control/protection: None   Other Topics Concern    None   Social History Narrative    Most recent tobacco use screenin2019    Do you currently or have you served in the [employer]: No    Were you activated, into active duty, as a member of the [employer] or as a Reservist: No    Occupation: disabled    Education: 12    Marital status:     Exercise level: Occasional    Diet: Specific    low sugar, high fat    General stress level: High    Alcohol intake: Occasional    Caffeine intake: Heavy    Chewing tobacco: none    Illicit drugs: none    Guns  present in home: No    Seat belts used routinely: Yes    Smoke alarm in home: Yes    Advance directive: Yes     Social Drivers of Health     Financial Resource Strain: Low Risk  (11/10/2022)    [facility]    Overall Financial Resource Strain (CARDIA)     Difficulty of Paying Living Expenses: Not hard at all   Food Insecurity: No Food Insecurity (11/10/2024)    Nursing - Inadequate Food Risk Classification     Worried About Running Out of Food in the Last Year: Never true     Ran Out of Food in the Last Year: Never true     Ran Out of Food in the Last Year: 1   Transportation Needs: No Transportation Needs (11/10/2024)    Nursing - Transportation Risk Classification     Lack of Transportation: Not on file     Lack of Transportation: 2   Physical Activity: Insufficiently Active (1/15/2024)    Exercise Vital Sign     Days of Exercise per Week: 7 days     Minutes of Exercise per Session: 20 min   Stress: No Stress Concern Present (1/15/2024)    [employer] - Occupational Stress Questionnaire     Feeling of Stress : Only a little   Social Connections: Unknown (2024)    Received from [facility]    Social Connections     How often do you feel lonely or isolated from those around you? (Adult - for ages 18 years and over): Not on file   Intimate Partner Violence: Unknown (11/10/2024)    Nursing IPS     Feels Physically and Emotionally Safe: Not on file     Physically Hurt by Someone: Not on file     Humiliated or Emotionally Abused by Someone: Not on file     Physically Hurt by Someone: 2     Hurt or Threatened by Someone: 2   Housing Stability: Unknown (11/10/2024)    Nursing: Inadequate Housing Risk Classification     Has Housing: Not on file     Worried About Losing Housing: Not on file     Unable to Get Utilities: Not on file     Unable to Pay for Housing in the Last Year: 2     Has Housin  "      Subjective         Objective    Physical Exam:   Assessment Type: (P) During-treatment  General Appearance: (P) Awake, Alert  Respiratory Pattern: (P) Normal  Chest Assessment: (P) Chest expansion symmetrical  Bilateral Breath Sounds: (P) Scattered, Expiratory wheezes, Coarse  Cough: (P) Productive  O2 Device: (P) RA    Vitals:  Blood pressure 109/74, pulse 72, temperature 98.3 °F (36.8 °C), resp. rate 16, height 5' 2\" (1.575 m), weight 52.2 kg (115 lb), last menstrual period 07/15/2024, SpO2 93%, not currently breastfeeding.          Imaging and other studies: Results Review Statement: No pertinent imaging studies reviewed.    O2 Device: (P) RA     Plan    Respiratory Plan: Home Bronchodilator Patient pathway  Airway Clearance Plan: Flutter     Resp Comments: (P) Pt requesting PRN tx at this time.  Pt w/ hx of CF and lung transplants.  Tx given   "

## 2024-11-16 NOTE — ASSESSMENT & PLAN NOTE
Patient initially presented with idiopathic swelling of the face and neck consistent with prior episodes of angioedema.  Sputum cultures were obtained early on with some of the patient's upper respiratory symptoms and Pro-Seth was obtained which was elevated.  Initial CT chest was without any definitive infiltrate.  Case was discussed with outpatient transplant providers and patient was initially recommended for prolonged course of antibiotic.  Was able to review and discussed with them today particularly about the concern for colonization in the airway in the setting of her uncontrolled reflux which I suspect is driving a majority of her upper airway symptoms.  We discussed obtaining repeat testing and completing likely short course at this time.  Can maintain on ceftriaxone for now  Will obtain repeat Pro-Seth today and tomorrow  Repeat CBC/CMP tomorrow to monitor response/dosing  Labs are pending collection today.  Requested repeat CT of the chest with primary service to evaluate for developing infiltrate  If imaging/Pro-Seth unremarkable and patient stable, will complete 7 days through 11/18  If imaging abnormal we will consider total 10 days, but will trial transition to oral amoxicillin  No plans at this time for 14-day course of antibiotic  Will continue to remain in touch with transplant providers  Additional supportive cares per primary  Additional interventions pending clinical course

## 2024-11-16 NOTE — ASSESSMENT & PLAN NOTE
"Came in for facial swelling, this is resolved. History of cystic fibrosis s/p 2 previous lung transplant with chronic rejection on tacrolimus/prednisone. Patient reports she is \"end-stage\" at this point, and not a candidate for another transplant  Continue chronic Mepron/Zithromax/Valtrex  11/11 spoke to Frances from [facility] transplant team, low threshold to treat for superimposed infection  Discussed again with Frnaces Camarillo (11/15) from [facility], initially recommending 14 days IV abx, however now agreeable ID recommendations  Procal 9.85 with cough productive of green sputum and ongoing epigastric/substernal pain  Patient remains afebrile without leukocytosis  Sputum culture +GBS, abx transitioned to IV Rocephin  Infectious disease consulted, appreciate input  Repeat procal, CT scan, if stable may complete 7 days abx to end on 11/18 if abnormal may extended to 10 day abx course with anticipation to transition to amoxicillin    "

## 2024-11-16 NOTE — CONSULTS
Consultation - Infectious Disease   Name: Maximilian Mann 48 y.o. female I MRN: [id number]  Unit/Bed#: -01 I Date of Admission: 11/10/2024   Date of Service: 11/16/2024 I Hospital Day: 4   Inpatient consult to Infectious Diseases  Consult performed by: Tammy Ko MD  Consult ordered by: WAYNE De Dios        Physician Requesting Evaluation: Armand Juarez MD   Reason for Evaluation / Principal Problem: Positive sputum culture      Assessment & Plan  Positive culture findings in sputum  Patient initially presented with idiopathic swelling of the face and neck consistent with prior episodes of angioedema.  Sputum cultures were obtained early on with some of the patient's upper respiratory symptoms and Pro-Seth was obtained which was elevated.  Initial CT chest was without any definitive infiltrate.  Case was discussed with outpatient transplant providers and patient was initially recommended for prolonged course of antibiotic.  Was able to review and discussed with them today particularly about the concern for colonization in the airway in the setting of her uncontrolled reflux which I suspect is driving a majority of her upper airway symptoms.  We discussed obtaining repeat testing and completing likely short course at this time.  Can maintain on ceftriaxone for now  Will obtain repeat Pro-Seth today and tomorrow  Repeat CBC/CMP tomorrow to monitor response/dosing  Labs are pending collection today.  Requested repeat CT of the chest with primary service to evaluate for developing infiltrate  If imaging/Pro-Seth unremarkable and patient stable, will complete 7 days through 11/18  If imaging abnormal we will consider total 10 days, but will trial transition to oral amoxicillin  No plans at this time for 14-day course of antibiotic  Will continue to remain in touch with transplant providers  Additional supportive cares per primary  Additional interventions pending clinical course    GERD  (gastroesophageal reflux disease)  Patient has gastroparesis as well as poorly controlled GERD.  She had fundoplication in the past which on recent imaging seems to have failed given recurrence of symptoms and sliding hiatal hernia noted.  Patient had exacerbation of this condition with recent doxycycline course.  I suspect that this is also contributing to recurrent colonization of the airway and also ongoing upper respiratory symptoms.  Lower suspicion for true pneumonia as above.  Will continue on current antibiotic for now  Follow-up pending imaging and Pro-Seth  Trend fever curve/vitals  Repeat CBC/chemistry [place]  Ongoing follow-up by GI  Additional supportive cares per primary  Additional interventions pending clinical course  Hx of lung transplant (HCC)  Patient previously underwent double lung transplant in 2013.  She required retransplant of the left lung in 2017.  Course was then complicated by need for right-sided pneumonectomy in 2022 given chronic progressive infections.  She remains on regimen with tacrolimus and prednisone.  Prophylactic medications and dosing reviewed.  Continue chronic atovaquone, Valtrex and azithromycin  Continue immunosuppressive therapy otherwise  Monitor closely for any drug drug interactions  Close follow-up with transplant center  Type 1 diabetes mellitus with diabetic chronic kidney disease, unspecified CKD stage ([facility])  Hemoglobin A1c of 7.5.  Creatinine appears to be at baseline.  This can however impact medication dosing.  This may also affect patient's Pro-Seth  Ongoing glucose management per primary  Repeat chemistry tomorrow  Will trend Pro-Seth for now  Follow-up pending imaging  Will avoid nephrotoxic agents if possible  Will dose adjust medications if needed.    Above plan discussed in detail with the patient and with her transplant provider at bedside  Above plan discussed in detail with primary service advance practitioner who is aware of recommendation for  continued antibiotic, trending Pro-Seth and repeat imaging along with anticipated short course of therapy    ID consult service will continue to follow.    HISTORY OF PRESENT ILLNESS:  HPI: Maximilian Mann is a 48 y.o. year old female who is known to me from her last admission at the end of October.  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  She is on chronic prednisone and tacrolimus.  She has had recurrent pulmonary infections including MRSA and Pseudomonas pneumonia as well as NTM infection eventually requiring right sided pneumonectomy in October 2022.  Patient has pancreatic insufficiency, diabetes and CKD.  She is also chronically on prophylaxis with atovaquone, azithromycin and Valtrex and is followed at the [facility].  In the past she had been on posaconazole prophylaxis along with CMV prophylaxis and recent transplant notes mention CMV IgG positivity.  Patient had been seen by our service in September for obstructing calculus and pyelonephritis.  I had seen the patient at the end of October as she had come in with acute symptoms of nausea, vomiting and confusion.  Patient has poorly controlled GERD along with a component of gastroparesis.  It was at that time that she had been placed on a course of doxycycline based on sputum cultures isolating MRSA and she had also stopped her acid suppressive medications as part of preparation for her upcoming manometry studies and EGD with GI.  Ultimately on that admission she met SIRS criteria and CT imaging was essentially unremarkable.  CT of the head showed sinus congestion and there was thickening tracking along the esophagus on her imaging as well.  Ultimately she was continued on IV doxycycline at the insistence of her transplant center while she was admitted but no additional  antibiotics were recommended after discharge as she was without any infiltrates on her lung imaging and ultimately concern was that the antibiotics themselves triggered this admission with worsening of her underlying GERD.  Her GI symptoms did improved with restart of her medication.  Patient in the past has had a fundoplication which on recent GI follow-through studies seem to have failed with sliding hiatal hernia present.  She was admitted this time for a presentation of angioedema.  She has had recurrent idiopathic episodes in the past.  She had onset of facial and neck swelling on this presentation.  She was treated with supportive measures.  CT soft tissue neck was negative.  CT chest again without any acute findings.  Again changes of esophagitis were seen on the imaging.  Patient did have sputum cultures collected early on and was maintained on IV doxycycline again.  Pro-Seth was obtained and that was elevated and seem to be downtrending on subsequent labs.  Sputum cultures later isolated group B strep and so antibiotics were adjusted after discussion with transplant center.  Since admission swelling in the face is largely resolved.  Patient still has an ongoing cough and upper respiratory symptoms.  She reported an episode of severe GERD overnight with acid coming up into the throat.  She frequently has to have a snack at her bedside at home because of these frequent episodes.  She is still pending further GI workup unfortunately.  Was able to contact her transplant provider and we reviewed her case along with her negative chest imaging.  Expressed to her my concern that we are treating colonization in the airway as opposed to true pneumonia.  We agreed upon repeating Pro-Seth as well as obtaining repeat CT imaging.  Ultimately if Pro-Seth is negative and CT imaging negative we will plan to stop antibiotic course at 7 days.  There center typically does 10 to 14 days of antibiotics further patients.  We agreed  that if her imaging and Pro-Seth are abnormal that we could look at doing a slightly longer course with 10 days but we could potentially use oral antibiotic such as amoxicillin which she has tolerated in the past.  Patient predominantly had issues with minocycline and doxycycline due to her underlying reflux which is expected.  Patient seems to be in agreement with this plan as well.  We are consulted at this time for further assistance with management and to determine final duration of antibiotic.    REVIEW OF SYSTEMS:  A complete 12 point system-based review of systems is negative other than that noted in the HPI.    PAST MEDICAL HISTORY:  Past Medical History:   Diagnosis Date    Abnormal uterine bleeding (AUB) 7/7/2022    ABPA (allergic bronchopulmonary aspergillosis) ([facility]) 07/08/2012    Acute kidney injury ([facility]) 09/08/2017    Acute on chronic respiratory failure ([facility]) 03/04/2017    Last Assessment & Plan:  Formatting of this note might be different from the original. Recurrent with clinical worsening with enlarging PTX, s/p multiple chest tubes, but new fevers 6/19 and worsening leukocytosis indicating infection as cause of 6/19 decompensation with profound hypoxia in setting of shunting through right lung and continued pneumothorax s/p 2nd chest tube placement.  6/27: Right    Anaphylaxis 06/06/2022    Anemia     Asthma     Chest pain 09/21/2021    Chronic pain     Chronic respiratory failure with hypoxia ([facility]) 09/07/2021    COVID-19 06/03/2022    Cystic fibrosis ([facility])     Deep vein thrombosis (HCC)     Diabetes mellitus ([facility])     Fever 09/22/2024    GERD (gastroesophageal reflux disease)     HBP (high blood pressure)     HCAP (healthcare-associated pneumonia) 03/06/2020    History of transfusion     Influenza B 02/23/2020    Kidney stone     Moderate protein-calorie malnutrition (HCC) 01/08/2022    Neutropenic fever  ([facility]) 09/26/2020    Pneumonia due to Pseudomonas species ([facility]) 10/28/2010    Last Assessment &  Plan:  Formatting of this note might be different from the original. Polymicrobial pna w/ pseudomonas, M. Abscessus, Cadida glabrata/albicans    PONV (postoperative nausea and vomiting)     Pseudomonas aeruginosa infection 09/20/2012    Renal calculus     Squamous cell skin cancer 01/12/2023    SCCIS- Left upper thigh    Transplant recipient     Lung- 2013, 2017    Ulcerative colitis (HCC)     Wellness examination 02/04/2013    Formatting of this note might be different from the original. Discussed with patient and acknowledgement form signed on 2/4/2013 (ALYSHA)     Past Surgical History:   Procedure Laterality Date    APPENDECTOMY      AUGMENTATION MAMMAPLASTY Bilateral 2004    AUGMENTATION MAMMAPLASTY Right 2021    removed     BREAST SURGERY      Aug.     BRONCHOSCOPY      CHOLECYSTECTOMY      COLONOSCOPY      EGD  07/2019    [facility]     FL RETROGRADE PYELOGRAM  01/09/2020    FL RETROGRADE PYELOGRAM  02/04/2020    FL RETROGRADE PYELOGRAM  9/20/2024    GASTROSCOPY  03/01/2024    GASTROSTOMY TUBE, PLACE      IR BALLOON-OCCLUDED ANTEGRADE TRANSVENOUS OBLITERATION (BATO)  11/22/2022    IR PORT PLACEMENT  03/22/2022    IR PORT REMOVAL  01/10/2022    IR PORT STRIPPING  11/25/2022    IR TUNNELED CENTRAL LINE PLACEMENT  01/13/2022    LUNG SURGERY      LUNG TRANSPLANT      X2    IN CYSTO BLADDER W/URETERAL CATHETERIZATION Right 01/09/2020    Procedure: CYSTOSCOPY RETROGRADE PYELOGRAM WITH INSERTION STENT URETERAL;  Surgeon: Patricio Perry MD;  Location: [facility];  Service: Urology    IN CYSTO/URETERO W/LITHOTRIPSY &INDWELL STENT INSRT Right 02/04/2020    Procedure: CYSTOSCOPY URETEROSCOPY WITH LITHOTRIPSY HOLMIUM LASER, RETROGRADE PYELOGRAM AND INSERTION STENT URETERAL;  Surgeon: Patricio Perry MD;  Location: [facility];  Service: Urology    IN CYSTO/URETERO W/LITHOTRIPSY &INDWELL STENT INSRT Left 9/20/2024    Procedure: CYSTOSCOPY URETEROSCOPY WITH LITHOTRIPSY HOLMIUM LASER, RETROGRADE PYELOGRAM AND  INSERTION STENT URETERAL, BASKET STONE EXTRACTION;  Surgeon: Lee Briceno MD;  Location: [facility];  Service: Urology    SKIN BIOPSY      US GUIDED VASCULAR ACCESS  2017       FAMILY HISTORY:  Non-contributory    SOCIAL HISTORY:  Social History   Social History     Substance and Sexual Activity   Alcohol Use Yes    Alcohol/week: 2.0 standard drinks of alcohol    Types: 2 Glasses of wine per week    Comment: social     Social History     Substance and Sexual Activity   Drug Use Yes    Frequency: 2.0 times per week    Types: Marijuana    Comment: medical edible marijuana prescribed     Social History     Tobacco Use   Smoking Status Former    Current packs/day: 0.00    Average packs/day: 0.5 packs/day for 12.0 years (6.0 ttl pk-yrs)    Types: Cigarettes    Start date: 1993    Quit date: 2005    Years since quittin.8    Passive exposure: Past   Smokeless Tobacco Never       ALLERGIES:  Allergies   Allergen Reactions    Bebtelovimab Anaphylaxis    Vancomycin Angioedema and Hives    Gabapentin Hallucinations    Ceftazidime Headache     Severe headaches after desensitization    Nsaids Other (See Comments)     Lung transplant increases risk of renal toxicity, call lung txp provider to discuss if needed       MEDICATIONS:  All current active medications have been reviewed.    PHYSICAL EXAM:  Temp:  [97.9 °F (36.6 °C)-98.9 °F (37.2 °C)] 98.3 °F (36.8 °C)  HR:  [72-85] 72  Resp:  [16] 16  BP: (109-136)/(74-88) 109/74  SpO2:  [92 %-94 %] 92 %  Temp (24hrs), Av.4 °F (36.9 °C), Min:97.9 °F (36.6 °C), Max:98.9 °F (37.2 °C)  Current: Temperature: 98.3 °F (36.8 °C)    Intake/Output Summary (Last 24 hours) at 2024 1214  Last data filed at 2024 0801  Gross per 24 hour   Intake 720 ml   Output --   Net 720 ml       General Appearance:  Chronically ill-appearing, nontoxic and in no acute distress.   Head:  Normocephalic, without obvious abnormality, atraumatic   Eyes:  Conjunctiva pink and sclera  anicteric, both eyes   Nose: Nares normal, mucosa normal, no drainage; nasal quality noted to the voice.   Throat: Oropharynx moist without lesions   Neck: Supple, symmetrical, no adenopathy, no tenderness/mass/nodules   Back:   Symmetric, no curvature, ROM normal, no CVA tenderness   Lungs:   Upper airway rhonchi heard throughout, no wheezing.   Chest Wall:  No tenderness or deformity; Port site accessed and appears unremarkable.   Heart:  RRR; no murmur, rub or gallop noted   Abdomen:   Soft, non-tender, non-distended, positive bowel sounds    Extremities: No cyanosis, clubbing or edema   Skin: No rashes or lesions. No draining wounds noted.   Lymph nodes: Cervical, supraclavicular nodes normal   Neurologic: Alert and oriented times 3, extremity strength 5/5 and symmetric       LABS, IMAGING, & OTHER STUDIES:  In completing this consult I have performed an extensive review of the medical records in epic including review of the notes, radiographs, and laboratory results as detailed below.     Lab Results:  I have personally reviewed pertinent labs.  Comments/Interpretations: No leukocytosis.  Previous Pro-Seth reviewed.  Labs pending collection this morning and repeat Pro-Seth ordered.    Results from last 7 days   Lab Units 11/15/24  0555 11/13/24  0355 11/12/24  0520   WBC Thousand/uL 4.60 5.04 5.31   HEMOGLOBIN g/dL 9.2* 8.5* 8.4*   PLATELETS Thousands/uL 89* 127* 112*     Results from last 7 days   Lab Units 11/15/24  0555 11/13/24  0355 11/12/24  0520 11/11/24  0858 11/10/24  1411   POTASSIUM mmol/L 4.7   < > 5.1 5.1 4.1   CHLORIDE mmol/L 105   < > 101 98 100   CO2 mmol/L 28   < > 29 29 37*   BUN mg/dL 31*   < > 21 20 21   CREATININE mg/dL 0.86   < > 0.93 1.05 1.22   EGFR ml/min/1.73sq m 81   < > 73 63 53   CALCIUM mg/dL 8.6   < > 8.0* 7.9* 7.7*   AST U/L  --   --  14 18 19   ALT U/L  --   --  19 23 22   ALK PHOS U/L  --   --  63 68 65    < > = values in this interval not displayed.     Results from last 7 days    Lab Units 11/11/24  1205   SPUTUM CULTURE  4+ Growth of Streptococcus agalactiae (Group B)*  4+ Growth of  4+ Growth of Candida albicans*   GRAM STAIN RESULT  1+ Epithelial cells per low power field*  3+ Gram positive cocci in pairs*  No polys seen*     Imaging Studies:   I have personally reviewed pertinent imaging study reports and images in PACS.  Comments/Interpretations:  Initial CT chest reviewed with some nodular changes but otherwise no dense infiltrate  Repeat CT chest requested    Other Studies:   I have personally reviewed other pertinent reports as below.  Records in CareEverywhere: Recent notes from transplant center reviewed    Nursing home/EMS Records: None    Current/Prior Cultures: Sputum cultures with GBS.  COVID and flu testing negative.

## 2024-11-16 NOTE — ASSESSMENT & PLAN NOTE
Hemoglobin A1c of 7.5.  Creatinine appears to be at baseline.  This can however impact medication dosing.  This may also affect patient's Pro-Seth  Ongoing glucose management per primary  Repeat chemistry tomorrow  Will trend Pro-Seth for now  Follow-up pending imaging  Will avoid nephrotoxic agents if possible  Will dose adjust medications if needed.

## 2024-11-16 NOTE — PLAN OF CARE
Problem: INFECTION - ADULT  Goal: Absence or prevention of progression during hospitalization  Description: INTERVENTIONS:  - Assess and monitor for signs and symptoms of infection  - Monitor lab/diagnostic results  - Monitor all insertion sites, i.e. indwelling lines, tubes, and drains  - Monitor endotracheal if appropriate and nasal secretions for changes in amount and color  - [place] appropriate cooling/warming therapies per order  - Administer medications as ordered  - Instruct and encourage patient and family to use good hand hygiene technique  - Identify and instruct in appropriate isolation precautions for identified infection/condition  Outcome: Progressing     Problem: RESPIRATORY - ADULT  Goal: Achieves optimal ventilation and oxygenation  Description: INTERVENTIONS:  - Assess for changes in respiratory status  - Assess for changes in mentation and behavior  - Position to facilitate oxygenation and minimize respiratory effort  - Oxygen administered by appropriate delivery if ordered  - Initiate smoking cessation education as indicated  - Encourage broncho-pulmonary hygiene including cough, deep breathe, Incentive Spirometry  - Assess the need for suctioning and aspirate as needed  - Assess and instruct to report SOB or any respiratory difficulty  - Respiratory Therapy support as indicated  Outcome: Progressing     Problem: PAIN - ADULT  Goal: Verbalizes/displays adequate comfort level or baseline comfort level  Description: Interventions:  - Encourage patient to monitor pain and request assistance  - Assess pain using appropriate pain scale  - Administer analgesics based on type and severity of pain and evaluate response  - Implement non-pharmacological measures as appropriate and evaluate response  - Consider cultural and social influences on pain and pain management  - Notify physician/advanced practitioner if interventions unsuccessful or patient reports new pain  Outcome: Not Progressing

## 2024-11-16 NOTE — ASSESSMENT & PLAN NOTE
Patient has gastroparesis as well as poorly controlled GERD.  She had fundoplication in the past which on recent imaging seems to have failed given recurrence of symptoms and sliding hiatal hernia noted.  Patient had exacerbation of this condition with recent doxycycline course.  I suspect that this is also contributing to recurrent colonization of the airway and also ongoing upper respiratory symptoms.  Lower suspicion for true pneumonia as above.  Will continue on current antibiotic for now  Follow-up pending imaging and Pro-Seth  Trend fever curve/vitals  Repeat CBC/chemistry [place]  Ongoing follow-up by GI  Additional supportive cares per primary  Additional interventions pending clinical course

## 2024-11-16 NOTE — PLAN OF CARE
Problem: PAIN - ADULT  Goal: Verbalizes/displays adequate comfort level or baseline comfort level  Description: Interventions:  - Encourage patient to monitor pain and request assistance  - Assess pain using appropriate pain scale  - Administer analgesics based on type and severity of pain and evaluate response  - Implement non-pharmacological measures as appropriate and evaluate response  - Consider cultural and social influences on pain and pain management  - Notify physician/advanced practitioner if interventions unsuccessful or patient reports new pain  Outcome: Progressing     Problem: INFECTION - ADULT  Goal: Absence or prevention of progression during hospitalization  Description: INTERVENTIONS:  - Assess and monitor for signs and symptoms of infection  - Monitor lab/diagnostic results  - Monitor all insertion sites, i.e. indwelling lines, tubes, and drains  - Monitor endotracheal if appropriate and nasal secretions for changes in amount and color  - [place] appropriate cooling/warming therapies per order  - Administer medications as ordered  - Instruct and encourage patient and family to use good hand hygiene technique  - Identify and instruct in appropriate isolation precautions for identified infection/condition  Outcome: Progressing     Problem: RESPIRATORY - ADULT  Goal: Achieves optimal ventilation and oxygenation  Description: INTERVENTIONS:  - Assess for changes in respiratory status  - Assess for changes in mentation and behavior  - Position to facilitate oxygenation and minimize respiratory effort  - Oxygen administered by appropriate delivery if ordered  - Initiate smoking cessation education as indicated  - Encourage broncho-pulmonary hygiene including cough, deep breathe, Incentive Spirometry  - Assess the need for suctioning and aspirate as needed  - Assess and instruct to report SOB or any respiratory difficulty  - Respiratory Therapy support as indicated  Outcome: Progressing

## 2024-11-17 LAB
ALBUMIN SERPL BCG-MCNC: 2.9 G/DL (ref 3.5–5)
ALP SERPL-CCNC: 56 U/L (ref 34–104)
ALT SERPL W P-5'-P-CCNC: 7 U/L (ref 7–52)
ANION GAP SERPL CALCULATED.3IONS-SCNC: 5 MMOL/L (ref 4–13)
AST SERPL W P-5'-P-CCNC: 23 U/L (ref 13–39)
BASOPHILS # BLD AUTO: 0.01 THOUSANDS/ÂΜL (ref 0–0.1)
BASOPHILS NFR BLD AUTO: 0 % (ref 0–1)
BILIRUB SERPL-MCNC: 0.18 MG/DL (ref 0.2–1)
BUN SERPL-MCNC: 25 MG/DL (ref 5–25)
CALCIUM ALBUM COR SERPL-MCNC: 8.2 MG/DL (ref 8.3–10.1)
CALCIUM SERPL-MCNC: 7.3 MG/DL (ref 8.4–10.2)
CHLORIDE SERPL-SCNC: 109 MMOL/L (ref 96–108)
CO2 SERPL-SCNC: 25 MMOL/L (ref 21–32)
CREAT SERPL-MCNC: 0.71 MG/DL (ref 0.6–1.3)
EOSINOPHIL # BLD AUTO: 0.22 THOUSAND/ÂΜL (ref 0–0.61)
EOSINOPHIL NFR BLD AUTO: 4 % (ref 0–6)
ERYTHROCYTE [DISTWIDTH] IN BLOOD BY AUTOMATED COUNT: 17.2 % (ref 11.6–15.1)
GFR SERPL CREATININE-BSD FRML MDRD: 102 ML/MIN/1.73SQ M
GLUCOSE SERPL-MCNC: 117 MG/DL (ref 65–140)
GLUCOSE SERPL-MCNC: 141 MG/DL (ref 65–140)
GLUCOSE SERPL-MCNC: 192 MG/DL (ref 65–140)
GLUCOSE SERPL-MCNC: 192 MG/DL (ref 65–140)
GLUCOSE SERPL-MCNC: 221 MG/DL (ref 65–140)
GLUCOSE SERPL-MCNC: 87 MG/DL (ref 65–140)
HCT VFR BLD AUTO: 32.4 % (ref 34.8–46.1)
HGB BLD-MCNC: 9 G/DL (ref 11.5–15.4)
IMM GRANULOCYTES # BLD AUTO: 0.05 THOUSAND/UL (ref 0–0.2)
IMM GRANULOCYTES NFR BLD AUTO: 1 % (ref 0–2)
LYMPHOCYTES # BLD AUTO: 1.68 THOUSANDS/ÂΜL (ref 0.6–4.47)
LYMPHOCYTES NFR BLD AUTO: 34 % (ref 14–44)
MCH RBC QN AUTO: 26.8 PG (ref 26.8–34.3)
MCHC RBC AUTO-ENTMCNC: 27.8 G/DL (ref 31.4–37.4)
MCV RBC AUTO: 96 FL (ref 82–98)
MONOCYTES # BLD AUTO: 0.63 THOUSAND/ÂΜL (ref 0.17–1.22)
MONOCYTES NFR BLD AUTO: 13 % (ref 4–12)
NEUTROPHILS # BLD AUTO: 2.38 THOUSANDS/ÂΜL (ref 1.85–7.62)
NEUTS SEG NFR BLD AUTO: 48 % (ref 43–75)
NRBC BLD AUTO-RTO: 1 /100 WBCS
PLATELET # BLD AUTO: 126 THOUSANDS/UL (ref 149–390)
PMV BLD AUTO: 10.4 FL (ref 8.9–12.7)
POTASSIUM SERPL-SCNC: 4.1 MMOL/L (ref 3.5–5.3)
PROCALCITONIN SERPL-MCNC: 0.35 NG/ML
PROT SERPL-MCNC: 5.4 G/DL (ref 6.4–8.4)
RBC # BLD AUTO: 3.36 MILLION/UL (ref 3.81–5.12)
SODIUM SERPL-SCNC: 139 MMOL/L (ref 135–147)
WBC # BLD AUTO: 4.97 THOUSAND/UL (ref 4.31–10.16)

## 2024-11-17 PROCEDURE — 85025 COMPLETE CBC W/AUTO DIFF WBC: CPT | Performed by: INTERNAL MEDICINE

## 2024-11-17 PROCEDURE — 80053 COMPREHEN METABOLIC PANEL: CPT | Performed by: INTERNAL MEDICINE

## 2024-11-17 PROCEDURE — 84145 PROCALCITONIN (PCT): CPT | Performed by: INTERNAL MEDICINE

## 2024-11-17 PROCEDURE — 82948 REAGENT STRIP/BLOOD GLUCOSE: CPT

## 2024-11-17 PROCEDURE — 99232 SBSQ HOSP IP/OBS MODERATE 35: CPT

## 2024-11-17 RX ADMIN — PANCRELIPASE 48000 UNITS: 120000; 24000; 76000 CAPSULE, DELAYED RELEASE PELLETS ORAL at 07:37

## 2024-11-17 RX ADMIN — HYDROMORPHONE HYDROCHLORIDE 0.5 MG: 1 INJECTION, SOLUTION INTRAMUSCULAR; INTRAVENOUS; SUBCUTANEOUS at 03:36

## 2024-11-17 RX ADMIN — CYCLOBENZAPRINE HYDROCHLORIDE 5 MG: 10 TABLET, FILM COATED ORAL at 21:11

## 2024-11-17 RX ADMIN — OXYCODONE HYDROCHLORIDE 15 MG: 10 TABLET ORAL at 11:30

## 2024-11-17 RX ADMIN — BUDESONIDE AND FORMOTEROL FUMARATE DIHYDRATE 2 PUFF: 160; 4.5 AEROSOL RESPIRATORY (INHALATION) at 21:10

## 2024-11-17 RX ADMIN — FAMOTIDINE 40 MG: 20 TABLET, FILM COATED ORAL at 21:10

## 2024-11-17 RX ADMIN — CEFTRIAXONE SODIUM 1000 MG: 10 INJECTION, POWDER, FOR SOLUTION INTRAVENOUS at 18:13

## 2024-11-17 RX ADMIN — SUCRALFATE 1 G: 1 TABLET ORAL at 11:30

## 2024-11-17 RX ADMIN — OXYCODONE HYDROCHLORIDE 20 MG: 10 TABLET ORAL at 16:22

## 2024-11-17 RX ADMIN — DICYCLOMINE HYDROCHLORIDE 20 MG: 10 CAPSULE ORAL at 16:22

## 2024-11-17 RX ADMIN — CALCIUM CARBONATE (ANTACID) CHEW TAB 500 MG 500 MG: 500 CHEW TAB at 03:25

## 2024-11-17 RX ADMIN — PREDNISONE 15 MG: 5 TABLET ORAL at 09:36

## 2024-11-17 RX ADMIN — OXYCODONE HYDROCHLORIDE 20 MG: 10 TABLET ORAL at 21:11

## 2024-11-17 RX ADMIN — ATOVAQUONE 1500 MG: 750 SUSPENSION ORAL at 09:37

## 2024-11-17 RX ADMIN — HYDROMORPHONE HYDROCHLORIDE 0.5 MG: 1 INJECTION, SOLUTION INTRAMUSCULAR; INTRAVENOUS; SUBCUTANEOUS at 22:31

## 2024-11-17 RX ADMIN — ACETAMINOPHEN 975 MG: 325 TABLET, FILM COATED ORAL at 05:30

## 2024-11-17 RX ADMIN — INSULIN LISPRO 10 UNITS: 100 INJECTION, SOLUTION INTRAVENOUS; SUBCUTANEOUS at 16:21

## 2024-11-17 RX ADMIN — HYDROMORPHONE HYDROCHLORIDE 0.5 MG: 1 INJECTION, SOLUTION INTRAMUSCULAR; INTRAVENOUS; SUBCUTANEOUS at 12:54

## 2024-11-17 RX ADMIN — DICYCLOMINE HYDROCHLORIDE 20 MG: 10 CAPSULE ORAL at 11:30

## 2024-11-17 RX ADMIN — OXYCODONE HYDROCHLORIDE 20 MG: 10 TABLET ORAL at 05:30

## 2024-11-17 RX ADMIN — NYSTATIN 500000 UNITS: 100000 SUSPENSION ORAL at 11:31

## 2024-11-17 RX ADMIN — PANCRELIPASE 48000 UNITS: 120000; 24000; 76000 CAPSULE, DELAYED RELEASE PELLETS ORAL at 16:22

## 2024-11-17 RX ADMIN — OXYCODONE HYDROCHLORIDE 20 MG: 10 TABLET ORAL at 01:31

## 2024-11-17 RX ADMIN — INSULIN GLARGINE 12 UNITS: 100 INJECTION, SOLUTION SUBCUTANEOUS at 09:37

## 2024-11-17 RX ADMIN — DIPHENHYDRAMINE HYDROCHLORIDE 25 MG: 50 INJECTION, SOLUTION INTRAMUSCULAR; INTRAVENOUS at 01:38

## 2024-11-17 RX ADMIN — PANTOPRAZOLE SODIUM 40 MG: 40 TABLET, DELAYED RELEASE ORAL at 03:25

## 2024-11-17 RX ADMIN — VALACYCLOVIR HYDROCHLORIDE 500 MG: 500 TABLET, FILM COATED ORAL at 09:36

## 2024-11-17 RX ADMIN — DIPHENHYDRAMINE HYDROCHLORIDE 25 MG: 50 INJECTION, SOLUTION INTRAMUSCULAR; INTRAVENOUS at 09:42

## 2024-11-17 RX ADMIN — TACROLIMUS 2 MG: 0.5 CAPSULE ORAL at 09:37

## 2024-11-17 RX ADMIN — ACETAMINOPHEN 975 MG: 325 TABLET, FILM COATED ORAL at 16:21

## 2024-11-17 RX ADMIN — GUAIFENESIN 1200 MG: 600 TABLET ORAL at 21:11

## 2024-11-17 RX ADMIN — APIXABAN 5 MG: 5 TABLET, FILM COATED ORAL at 09:36

## 2024-11-17 RX ADMIN — SUCRALFATE 1 G: 1 TABLET ORAL at 03:25

## 2024-11-17 RX ADMIN — ALPRAZOLAM 1 MG: 0.5 TABLET ORAL at 00:08

## 2024-11-17 RX ADMIN — PANCRELIPASE 48000 UNITS: 120000; 24000; 76000 CAPSULE, DELAYED RELEASE PELLETS ORAL at 11:30

## 2024-11-17 RX ADMIN — HYDROMORPHONE HYDROCHLORIDE 0.5 MG: 1 INJECTION, SOLUTION INTRAMUSCULAR; INTRAVENOUS; SUBCUTANEOUS at 08:06

## 2024-11-17 RX ADMIN — BUDESONIDE AND FORMOTEROL FUMARATE DIHYDRATE 2 PUFF: 160; 4.5 AEROSOL RESPIRATORY (INHALATION) at 07:40

## 2024-11-17 RX ADMIN — CYCLOBENZAPRINE HYDROCHLORIDE 5 MG: 10 TABLET, FILM COATED ORAL at 05:30

## 2024-11-17 RX ADMIN — NYSTATIN 500000 UNITS: 100000 SUSPENSION ORAL at 09:37

## 2024-11-17 RX ADMIN — HYDROMORPHONE HYDROCHLORIDE 0.5 MG: 1 INJECTION, SOLUTION INTRAMUSCULAR; INTRAVENOUS; SUBCUTANEOUS at 18:13

## 2024-11-17 RX ADMIN — POLYETHYLENE GLYCOL 3350 17 G: 17 POWDER, FOR SOLUTION ORAL at 16:38

## 2024-11-17 RX ADMIN — NYSTATIN 500000 UNITS: 100000 SUSPENSION ORAL at 17:57

## 2024-11-17 RX ADMIN — TACROLIMUS 2 MG: 0.5 CAPSULE ORAL at 17:57

## 2024-11-17 RX ADMIN — PANTOPRAZOLE SODIUM 40 MG: 40 TABLET, DELAYED RELEASE ORAL at 16:22

## 2024-11-17 RX ADMIN — NYSTATIN 500000 UNITS: 100000 SUSPENSION ORAL at 21:11

## 2024-11-17 RX ADMIN — INSULIN LISPRO 10 UNITS: 100 INJECTION, SOLUTION INTRAVENOUS; SUBCUTANEOUS at 07:40

## 2024-11-17 RX ADMIN — INSULIN LISPRO 2 UNITS: 100 INJECTION, SOLUTION INTRAVENOUS; SUBCUTANEOUS at 07:40

## 2024-11-17 RX ADMIN — APIXABAN 5 MG: 5 TABLET, FILM COATED ORAL at 17:57

## 2024-11-17 RX ADMIN — SUCRALFATE 1 G: 1 TABLET ORAL at 16:22

## 2024-11-17 RX ADMIN — DICYCLOMINE HYDROCHLORIDE 20 MG: 10 CAPSULE ORAL at 21:11

## 2024-11-17 RX ADMIN — GUAIFENESIN 1200 MG: 600 TABLET ORAL at 09:36

## 2024-11-17 RX ADMIN — PREGABALIN 150 MG: 75 CAPSULE ORAL at 17:57

## 2024-11-17 RX ADMIN — PREGABALIN 150 MG: 75 CAPSULE ORAL at 09:36

## 2024-11-17 NOTE — ASSESSMENT & PLAN NOTE
"Came in for facial swelling, this is resolved. History of cystic fibrosis s/p 2 previous lung transplant with chronic rejection on tacrolimus/prednisone. Patient reports she is \"end-stage\" at this point, and not a candidate for another transplant  Continue chronic Mepron/Zithromax/Valtrex  11/11 spoke to Frances from [facility] transplant team, low threshold to treat for superimposed infection  Discussed again with Frances Camarillo (11/15) from [facility], initially recommending 14 days IV abx, however now agreeable ID recommendations  Repeat Procal 0.42, 0.35  Patient remains afebrile without leukocytosis  Sputum culture +GBS, abx transitioned to IV Rocephin  Infectious disease consulted, appreciate input  May complete 7 days abx to end on 11/18   Repeat CT Chest: Left lung transplant with no consolidation.Mild chronic tree-in-bud nodularity in the left lung, infectious/inflammatory  "

## 2024-11-17 NOTE — PROGRESS NOTES
"Progress Note - Hospitalist   Name: Maximilian Mann 48 y.o. female I MRN: [id number]  Unit/Bed#: -01 I Date of Admission: 11/10/2024   Date of Service: 11/17/2024 I Hospital Day: 5    Assessment & Plan  Cystic fibrosis ([facility])  Came in for facial swelling, this is resolved. History of cystic fibrosis s/p 2 previous lung transplant with chronic rejection on tacrolimus/prednisone. Patient reports she is \"end-stage\" at this point, and not a candidate for another transplant  Continue chronic Mepron/Zithromax/Valtrex  11/11 spoke to Frances from [facility] transplant team, low threshold to treat for superimposed infection  Discussed again with Frances Camarillo (11/15) from [facility], initially recommending 14 days IV abx, however now agreeable ID recommendations  Repeat Procal 0.42, 0.35  Patient remains afebrile without leukocytosis  Sputum culture +GBS, abx transitioned to IV Rocephin  Infectious disease consulted, appreciate input  May complete 7 days abx to end on 11/18   Repeat CT Chest: Left lung transplant with no consolidation.Mild chronic tree-in-bud nodularity in the left lung, infectious/inflammatory  Chronic pain disorder  Maintained chronically on oral oxycodone 10 mg every 4 hours as needed.  Increased to 15 mg or 20 mg for moderate to severe pain while hospitalized  IV Dilaudid for breakthrough  Aqua K  Patient appears very comfortable but is still requesting additional pain medication for costochondritis like symptoms education and emotional support provided  Avoid increase in pain medication if at all possible  Hx of lung transplant ([facility])  Transplant x2, now with only left functioning lung and chronic rejection on tacrolimus/prednisone.  History of pulmonary embolus (PE)  Continue Eliquis.  Type 1 diabetes mellitus with diabetic chronic kidney disease, unspecified CKD stage ([facility])  Blood Sugar Average: Last 72 hrs:(P) 184  Lantus 12 units nightly ordered, 8u with meals  Sliding scale coverage with ACHS " checks  Consistent carb diet  Hypoglycemia protocol  GERD (gastroesophageal reflux disease)  Continue Pepcid, add pantoprazole and carafate due to esophagitis on imaging. Can trial bentyl as well  Is due to EGD as well as Bravo pH test  Positive culture findings in sputum  Continue IV Rocephin  ID following    VTE Pharmacologic Prophylaxis: VTE Score: 1 Low Risk (Score 0-2) - Encourage Ambulation.    Mobility:   Basic Mobility Inpatient Raw Score: 24  JH-HLM Goal: 8: Walk 250 feet or more  JH-HLM Achieved: 6: Walk 10 steps or more  JH-HLM Goal NOT achieved. Continue with multidisciplinary rounding and encourage appropriate mobility to improve upon JH-HLM goals.    Patient Centered Rounds: I performed bedside rounds with nursing staff today.   Discussions with Specialists or Other Care Team Provider:   Infectious Disease    Education and Discussions with Family / Patient: Patient declined call to .     Current Length of Stay: 5 day(s)  Current Patient Status: Inpatient   Certification Statement: The patient will continue to require additional inpatient hospital stay due to need for IV antibiotics  Discharge Plan: Anticipate discharge tomorrow to home.    Code Status: Level 1 - Full Code    Subjective   Patient seen and examined sitting up in bed. No complaints to offer. Agreeable to current plan of care.     Objective :  Temp:  [97.9 °F (36.6 °C)-98.3 °F (36.8 °C)] 97.9 °F (36.6 °C)  HR:  [75-90] 75  BP: (128-145)/(70-89) 145/79  Resp:  [17-21] 17  SpO2:  [90 %-94 %] 90 %  O2 Device: None (Room air)    Body mass index is 21.03 kg/m².     Input and Output Summary (last 24 hours):     Intake/Output Summary (Last 24 hours) at 11/17/2024 1059  Last data filed at 11/16/2024 1521  Gross per 24 hour   Intake 720 ml   Output --   Net 720 ml       Physical Exam  Vitals and nursing note reviewed.   Constitutional:       General: She is not in acute distress.  HENT:      Mouth/Throat:      Mouth: Mucous membranes  are moist.   Eyes:      Conjunctiva/sclera: Conjunctivae normal.   Cardiovascular:      Rate and Rhythm: Normal rate and regular rhythm.      Pulses: Normal pulses.      Heart sounds: Normal heart sounds.   Pulmonary:      Effort: Pulmonary effort is normal.      Breath sounds: Rhonchi present.   Abdominal:      General: There is no distension.      Palpations: Abdomen is soft.      Tenderness: There is no abdominal tenderness.   Musculoskeletal:         General: No swelling.   Skin:     General: Skin is warm.   Neurological:      Mental Status: She is alert and oriented to person, place, and time.   Psychiatric:         Mood and Affect: Mood normal.         Behavior: Behavior normal.         Thought Content: Thought content normal.           Lines/Drains:  Lines/Drains/Airways       Active Status       Name Placement date Placement time Site Days    Port A Cath 11/25/22 Right Chest 11/25/22  0953  Chest  723                    Central Line:  Goal for removal: Port accessed. Will de-access as appropriate.               Lab Results: I have reviewed the following results:   Results from last 7 days   Lab Units 11/17/24  0537   WBC Thousand/uL 4.97   HEMOGLOBIN g/dL 9.0*   HEMATOCRIT % 32.4*   PLATELETS Thousands/uL 126*   SEGS PCT % 48   LYMPHO PCT % 34   MONO PCT % 13*   EOS PCT % 4     Results from last 7 days   Lab Units 11/17/24  0537   SODIUM mmol/L 139   POTASSIUM mmol/L 4.1   CHLORIDE mmol/L 109*   CO2 mmol/L 25   BUN mg/dL 25   CREATININE mg/dL 0.71   ANION GAP mmol/L 5   CALCIUM mg/dL 7.3*   ALBUMIN g/dL 2.9*   TOTAL BILIRUBIN mg/dL 0.18*   ALK PHOS U/L 56   ALT U/L 7   AST U/L 23   GLUCOSE RANDOM mg/dL 141*         Results from last 7 days   Lab Units 11/17/24  1038 11/17/24  0609 11/16/24  2032 11/16/24  1813 11/16/24  1554 11/16/24  1103 11/16/24  0617 11/15/24  2004 11/15/24  1536 11/15/24  1126 11/15/24  0622 11/14/24  2051   POC GLUCOSE mg/dl 87 192* 275* 376* 296* 57* 209* 108 223* 229* 115 208*          Results from last 7 days   Lab Units 11/17/24  0537 11/16/24  1354 11/13/24  0355 11/12/24  0520   PROCALCITONIN ng/ml 0.35* 0.42* 4.33* 9.85*       Recent Cultures (last 7 days):   Results from last 7 days   Lab Units 11/11/24  1205   SPUTUM CULTURE  4+ Growth of Streptococcus agalactiae (Group B)*  4+ Growth of  4+ Growth of Candida albicans*   GRAM STAIN RESULT  1+ Epithelial cells per low power field*  3+ Gram positive cocci in pairs*  No polys seen*       Imaging Results Review: No pertinent imaging studies reviewed.  Other Study Results Review: No additional pertinent studies reviewed.    Last 24 Hours Medication List:     Current Facility-Administered Medications:     acetaminophen (TYLENOL) tablet 650 mg, Q6H PRN    acetaminophen (TYLENOL) tablet 975 mg, Q8H DOTTY    albuterol inhalation solution 5 mg, Q4H PRN    ALPRAZolam (XANAX) tablet 1 mg, HS PRN    apixaban (ELIQUIS) tablet 5 mg, BID    atovaquone (MEPRON) oral suspension 1,500 mg, Daily    azithromycin (ZITHROMAX) tablet 250 mg, Once per day on Monday Wednesday Friday    budesonide-formoterol (SYMBICORT) 160-4.5 mcg/act inhaler 2 puff, Q12H    calcium carbonate (TUMS) chewable tablet 500 mg, Daily PRN    cefTRIAXone (ROCEPHIN) 1,000 mg in dextrose 5 % 50 mL IVPB, Q24H, Last Rate: 1,000 mg (11/16/24 1803)    cyclobenzaprine (FLEXERIL) tablet 5 mg, TID PRN    dicyclomine (BENTYL) capsule 20 mg, 4x Daily (AC & HS)    diphenhydrAMINE (BENADRYL) injection 25 mg, Q6H PRN    famotidine (PEPCID) tablet 40 mg, HS    guaiFENesin (MUCINEX) 12 hr tablet 1,200 mg, Q12H DOTTY    HYDROmorphone (DILAUDID) injection 0.5 mg, Q4H PRN    insulin glargine (LANTUS) subcutaneous injection 12 Units 0.12 mL, QAM    insulin lispro (HumALOG/ADMELOG) 100 units/mL subcutaneous injection 10 Units, Daily With Breakfast    insulin lispro (HumALOG/ADMELOG) 100 units/mL subcutaneous injection 12 Units, Daily With Dinner    insulin lispro (HumALOG/ADMELOG) 100 units/mL subcutaneous  injection 2-7 Units, TID AC **AND** Fingerstick Glucose (POCT), TID AC    insulin lispro (HumALOG/ADMELOG) 100 units/mL subcutaneous injection 8 Units, Daily With Lunch    metoclopramide (REGLAN) injection 5 mg, Q6H DOTTY    naloxone (NARCAN) 0.04 mg/mL syringe 0.04 mg, Q1MIN PRN    nystatin (MYCOSTATIN) oral suspension 500,000 Units, 4x Daily    oxyCODONE (ROXICODONE) IR tablet 15 mg, Q4H PRN **AND** oxyCODONE (ROXICODONE) immediate release tablet 20 mg, Q4H PRN    pancrelipase (Lip-Prot-Amyl) (CREON) delayed release capsule 24,000 Units, TID PRN    pancrelipase (Lip-Prot-Amyl) (CREON) delayed release capsule 48,000 Units, TID With Meals    pantoprazole (PROTONIX) EC tablet 40 mg, BID AC    polyethylene glycol (MIRALAX) packet 17 g, Daily PRN    predniSONE tablet 15 mg, Daily    pregabalin (LYRICA) capsule 150 mg, BID    sucralfate (CARAFATE) tablet 1 g, TID AC    tacrolimus (PROGRAF) capsule 2 mg, BID    valACYclovir (VALTREX) tablet 500 mg, Daily    Administrative Statements   Today, Patient Was Seen By: WAYNE Templeton  I have spent a total time of 32 minutes in caring for this patient on the day of the visit/encounter including Prognosis, Instructions for management, Patient and family education, Importance of tx compliance, Impressions, Counseling / Coordination of care, Documenting in the medical record, Reviewing / ordering tests, medicine, procedures  , Obtaining or reviewing history  , and Communicating with other healthcare professionals .    **Please Note: This note may have been constructed using a voice recognition system.**

## 2024-11-17 NOTE — PLAN OF CARE
Problem: Potential for Falls  Goal: Patient will remain free of falls  Description: INTERVENTIONS:  - Educate patient/family on patient safety including physical limitations  - Instruct patient to call for assistance with activity   - Consult OT/PT to assist with strengthening/mobility   - Keep Call bell within reach  - Keep bed low and locked with side rails adjusted as appropriate  - Keep care items and personal belongings within reach  - Initiate and maintain comfort rounds  - Make Fall Risk Sign visible to staff  - Offer Toileting every  Hours, in advance of need  - Initiate/Maintain alarm  - Obtain necessary fall risk management equipment:   - Apply yellow socks and bracelet for high fall risk patients  - Consider moving patient to room near nurses station  Outcome: Progressing     Problem: PAIN - ADULT  Goal: Verbalizes/displays adequate comfort level or baseline comfort level  Description: Interventions:  - Encourage patient to monitor pain and request assistance  - Assess pain using appropriate pain scale  - Administer analgesics based on type and severity of pain and evaluate response  - Implement non-pharmacological measures as appropriate and evaluate response  - Consider cultural and social influences on pain and pain management  - Notify physician/advanced practitioner if interventions unsuccessful or patient reports new pain  Outcome: Progressing     Problem: INFECTION - ADULT  Goal: Absence or prevention of progression during hospitalization  Description: INTERVENTIONS:  - Assess and monitor for signs and symptoms of infection  - Monitor lab/diagnostic results  - Monitor all insertion sites, i.e. indwelling lines, tubes, and drains  - Monitor endotracheal if appropriate and nasal secretions for changes in amount and color  - [place] appropriate cooling/warming therapies per order  - Administer medications as ordered  - Instruct and encourage patient and family to use good hand hygiene technique  -  Identify and instruct in appropriate isolation precautions for identified infection/condition  Outcome: Progressing  Goal: Absence of fever/infection during neutropenic period  Description: INTERVENTIONS:  - Monitor WBC    Outcome: Progressing     Problem: SAFETY ADULT  Goal: Patient will remain free of falls  Description: INTERVENTIONS:  - Educate patient/family on patient safety including physical limitations  - Instruct patient to call for assistance with activity   - Consult OT/PT to assist with strengthening/mobility   - Keep Call bell within reach  - Keep bed low and locked with side rails adjusted as appropriate  - Keep care items and personal belongings within reach  - Initiate and maintain comfort rounds  - Make Fall Risk Sign visible to staff  - Offer Toileting every  Hours, in advance of need  - Initiate/Maintain alarm  - Obtain necessary fall risk management equipment:   - Apply yellow socks and bracelet for high fall risk patients  - Consider moving patient to room near nurses station  Outcome: Progressing  Goal: Maintain or return to baseline ADL function  Description: INTERVENTIONS:  -  Assess patient's ability to carry out ADLs; assess patient's baseline for ADL function and identify physical deficits which impact ability to perform ADLs (bathing, care of mouth/teeth, toileting, grooming, dressing, etc.)  - Assess/evaluate cause of self-care deficits   - Assess range of motion  - Assess patient's mobility; develop plan if impaired  - Assess patient's need for assistive devices and provide as appropriate  - Encourage maximum independence but intervene and supervise when necessary  - Involve family in performance of ADLs  - Assess for home care needs following discharge   - Consider OT consult to assist with ADL evaluation and planning for discharge  - Provide patient education as appropriate  Outcome: Progressing  Goal: Maintains/Returns to pre admission functional level  Description:  INTERVENTIONS:  - Perform AM-PAC 6 Click Basic Mobility/ Daily Activity assessment daily.  - Set and communicate daily mobility goal to care team and patient/family/caregiver.   - Collaborate with rehabilitation services on mobility goals if consulted  - Perform Range of Motion  times a day.  - Reposition patient every  hours.  - Dangle patient  times a day  - Stand patient  times a day  - Ambulate patient  times a day  - Out of bed to chair  times a day   - Out of bed for meals  times a day  - Out of bed for toileting  - Record patient progress and toleration of activity level   Outcome: Progressing     Problem: RESPIRATORY - ADULT  Goal: Achieves optimal ventilation and oxygenation  Description: INTERVENTIONS:  - Assess for changes in respiratory status  - Assess for changes in mentation and behavior  - Position to facilitate oxygenation and minimize respiratory effort  - Oxygen administered by appropriate delivery if ordered  - Initiate smoking cessation education as indicated  - Encourage broncho-pulmonary hygiene including cough, deep breathe, Incentive Spirometry  - Assess the need for suctioning and aspirate as needed  - Assess and instruct to report SOB or any respiratory difficulty  - Respiratory Therapy support as indicated  Outcome: Progressing

## 2024-11-17 NOTE — TREATMENT PLAN
Patient's chart reviewed.  She is currently afebrile.  No significant leukocytosis.  Pro-Seth repeated both yesterday and today and it is down trended to 0.35.  This is more than 80% from peak value of 9.8.  No new cultures noted.  CT chest imaging repeated and there is no consolidation within the transplanted lung.  There is only some mild chronic tree-in-bud changes.  Vitals are otherwise stable.    At this point we will plan to complete last dose of antibiotic through tomorrow.  Last doses ordered.  Would continue pain management and GERD treatments as per primary.  No plans for more prolonged course of antibiotic given improvement in Pro-Seth and negative CT imaging at this point.  Above plan was discussed with primary service.  Will plan to reevaluate this patient again formally tomorrow to ensure that she is symptomatically otherwise stable aside from ongoing upper respiratory issues likely related to her GERD.  Will likely also need to touch base with her transplant provider at [facility] to close the loop.    ID consult service will formally reevaluate this patient again tomorrow.  Please contact ID attending on call if questions in the interim.

## 2024-11-17 NOTE — PLAN OF CARE
Problem: PAIN - ADULT  Goal: Verbalizes/displays adequate comfort level or baseline comfort level  Description: Interventions:  - Encourage patient to monitor pain and request assistance  - Assess pain using appropriate pain scale  - Administer analgesics based on type and severity of pain and evaluate response  - Implement non-pharmacological measures as appropriate and evaluate response  - Consider cultural and social influences on pain and pain management  - Notify physician/advanced practitioner if interventions unsuccessful or patient reports new pain  Outcome: Progressing     Problem: INFECTION - ADULT  Goal: Absence or prevention of progression during hospitalization  Description: INTERVENTIONS:  - Assess and monitor for signs and symptoms of infection  - Monitor lab/diagnostic results  - Monitor all insertion sites, i.e. indwelling lines, tubes, and drains  - Monitor endotracheal if appropriate and nasal secretions for changes in amount and color  - [place] appropriate cooling/warming therapies per order  - Administer medications as ordered  - Instruct and encourage patient and family to use good hand hygiene technique  - Identify and instruct in appropriate isolation precautions for identified infection/condition  Outcome: Progressing     Problem: INFECTION - ADULT  Goal: Absence of fever/infection during neutropenic period  Description: INTERVENTIONS:  - Monitor WBC    Outcome: Progressing     Problem: RESPIRATORY - ADULT  Goal: Achieves optimal ventilation and oxygenation  Description: INTERVENTIONS:  - Assess for changes in respiratory status  - Assess for changes in mentation and behavior  - Position to facilitate oxygenation and minimize respiratory effort  - Oxygen administered by appropriate delivery if ordered  - Initiate smoking cessation education as indicated  - Encourage broncho-pulmonary hygiene including cough, deep breathe, Incentive Spirometry  - Assess the need for suctioning and  aspirate as needed  - Assess and instruct to report SOB or any respiratory difficulty  - Respiratory Therapy support as indicated  Outcome: Progressing

## 2024-11-17 NOTE — ASSESSMENT & PLAN NOTE
Blood Sugar Average: Last 72 hrs:(P) 184  Lantus 12 units nightly ordered, 8u with meals  Sliding scale coverage with ACHS checks  Consistent carb diet  Hypoglycemia protocol

## 2024-11-18 VITALS
HEIGHT: 62 IN | SYSTOLIC BLOOD PRESSURE: 142 MMHG | OXYGEN SATURATION: 92 % | WEIGHT: 115 LBS | RESPIRATION RATE: 18 BRPM | DIASTOLIC BLOOD PRESSURE: 97 MMHG | TEMPERATURE: 98.5 F | HEART RATE: 73 BPM | BODY MASS INDEX: 21.16 KG/M2

## 2024-11-18 LAB
ANION GAP SERPL CALCULATED.3IONS-SCNC: 4 MMOL/L (ref 4–13)
BUN SERPL-MCNC: 26 MG/DL (ref 5–25)
CALCIUM SERPL-MCNC: 6.5 MG/DL (ref 8.4–10.2)
CHLORIDE SERPL-SCNC: 111 MMOL/L (ref 96–108)
CO2 SERPL-SCNC: 24 MMOL/L (ref 21–32)
CREAT SERPL-MCNC: 0.67 MG/DL (ref 0.6–1.3)
ERYTHROCYTE [DISTWIDTH] IN BLOOD BY AUTOMATED COUNT: 17.2 % (ref 11.6–15.1)
GFR SERPL CREATININE-BSD FRML MDRD: 105 ML/MIN/1.73SQ M
GLUCOSE SERPL-MCNC: 131 MG/DL (ref 65–140)
GLUCOSE SERPL-MCNC: 139 MG/DL (ref 65–140)
GLUCOSE SERPL-MCNC: 158 MG/DL (ref 65–140)
HCT VFR BLD AUTO: 31.2 % (ref 34.8–46.1)
HGB BLD-MCNC: 8.9 G/DL (ref 11.5–15.4)
MCH RBC QN AUTO: 27 PG (ref 26.8–34.3)
MCHC RBC AUTO-ENTMCNC: 28.5 G/DL (ref 31.4–37.4)
MCV RBC AUTO: 95 FL (ref 82–98)
PLATELET # BLD AUTO: 143 THOUSANDS/UL (ref 149–390)
PMV BLD AUTO: 11 FL (ref 8.9–12.7)
POTASSIUM SERPL-SCNC: 3.8 MMOL/L (ref 3.5–5.3)
RBC # BLD AUTO: 3.3 MILLION/UL (ref 3.81–5.12)
SODIUM SERPL-SCNC: 139 MMOL/L (ref 135–147)
WBC # BLD AUTO: 5.23 THOUSAND/UL (ref 4.31–10.16)

## 2024-11-18 PROCEDURE — 99239 HOSP IP/OBS DSCHRG MGMT >30: CPT | Performed by: NURSE PRACTITIONER

## 2024-11-18 PROCEDURE — 85027 COMPLETE CBC AUTOMATED: CPT

## 2024-11-18 PROCEDURE — 99233 SBSQ HOSP IP/OBS HIGH 50: CPT | Performed by: NURSE PRACTITIONER

## 2024-11-18 PROCEDURE — 94664 DEMO&/EVAL PT USE INHALER: CPT

## 2024-11-18 PROCEDURE — 99232 SBSQ HOSP IP/OBS MODERATE 35: CPT | Performed by: INTERNAL MEDICINE

## 2024-11-18 PROCEDURE — 94640 AIRWAY INHALATION TREATMENT: CPT

## 2024-11-18 PROCEDURE — 94760 N-INVAS EAR/PLS OXIMETRY 1: CPT

## 2024-11-18 PROCEDURE — 82948 REAGENT STRIP/BLOOD GLUCOSE: CPT

## 2024-11-18 PROCEDURE — 80048 BASIC METABOLIC PNL TOTAL CA: CPT

## 2024-11-18 RX ORDER — PANTOPRAZOLE SODIUM 40 MG/1
40 TABLET, DELAYED RELEASE ORAL
Qty: 60 TABLET | Refills: 0 | Status: SHIPPED | OUTPATIENT
Start: 2024-11-18 | End: 2024-11-18

## 2024-11-18 RX ORDER — INSULIN LISPRO 100 [IU]/ML
12 INJECTION, SOLUTION INTRAVENOUS; SUBCUTANEOUS
Qty: 3.6 ML | Refills: 0 | Status: SHIPPED | OUTPATIENT
Start: 2024-11-18

## 2024-11-18 RX ORDER — SUCRALFATE 1 G/1
1 TABLET ORAL
Qty: 90 TABLET | Refills: 0 | Status: SHIPPED | OUTPATIENT
Start: 2024-11-18 | End: 2024-11-18

## 2024-11-18 RX ORDER — GUAIFENESIN 1200 MG/1
1200 TABLET, EXTENDED RELEASE ORAL EVERY 12 HOURS SCHEDULED
Qty: 60 TABLET | Refills: 0 | Status: SHIPPED | OUTPATIENT
Start: 2024-11-18 | End: 2024-11-18

## 2024-11-18 RX ORDER — CYCLOBENZAPRINE HCL 5 MG
5 TABLET ORAL 3 TIMES DAILY PRN
Qty: 24 TABLET | Refills: 0 | Status: SHIPPED | OUTPATIENT
Start: 2024-11-18 | End: 2024-12-02

## 2024-11-18 RX ORDER — CYCLOBENZAPRINE HCL 5 MG
5 TABLET ORAL 3 TIMES DAILY PRN
Qty: 24 TABLET | Refills: 0 | Status: SHIPPED | OUTPATIENT
Start: 2024-11-18 | End: 2024-11-18

## 2024-11-18 RX ORDER — NYSTATIN 100000 [USP'U]/ML
500000 SUSPENSION ORAL 4 TIMES DAILY
Qty: 600 ML | Refills: 0 | Status: SHIPPED | OUTPATIENT
Start: 2024-11-18 | End: 2024-11-18

## 2024-11-18 RX ORDER — HEPARIN 100 UNIT/ML
300 SYRINGE INTRAVENOUS ONCE
Status: COMPLETED | OUTPATIENT
Start: 2024-11-18 | End: 2024-11-18

## 2024-11-18 RX ORDER — INSULIN LISPRO 100 [IU]/ML
10 INJECTION, SOLUTION INTRAVENOUS; SUBCUTANEOUS
Qty: 3 ML | Refills: 0 | Status: SHIPPED | OUTPATIENT
Start: 2024-11-19

## 2024-11-18 RX ORDER — PANTOPRAZOLE SODIUM 40 MG/1
40 TABLET, DELAYED RELEASE ORAL
Qty: 60 TABLET | Refills: 0 | Status: SHIPPED | OUTPATIENT
Start: 2024-11-18 | End: 2024-12-18

## 2024-11-18 RX ORDER — DICYCLOMINE HYDROCHLORIDE 10 MG/1
20 CAPSULE ORAL
Qty: 240 CAPSULE | Refills: 0 | Status: SHIPPED | OUTPATIENT
Start: 2024-11-18 | End: 2024-11-18

## 2024-11-18 RX ORDER — INSULIN LISPRO 100 [IU]/ML
8 INJECTION, SOLUTION INTRAVENOUS; SUBCUTANEOUS
Qty: 2.4 ML | Refills: 0 | Status: SHIPPED | OUTPATIENT
Start: 2024-11-19

## 2024-11-18 RX ORDER — GUAIFENESIN 1200 MG/1
1200 TABLET, EXTENDED RELEASE ORAL EVERY 12 HOURS SCHEDULED
Qty: 60 TABLET | Refills: 0 | Status: SHIPPED | OUTPATIENT
Start: 2024-11-18 | End: 2024-12-18

## 2024-11-18 RX ORDER — NYSTATIN 100000 [USP'U]/ML
500000 SUSPENSION ORAL 4 TIMES DAILY
Qty: 200 ML | Refills: 0 | Status: SHIPPED | OUTPATIENT
Start: 2024-11-18 | End: 2024-11-28

## 2024-11-18 RX ORDER — SUCRALFATE 1 G/1
1 TABLET ORAL
Qty: 90 TABLET | Refills: 0 | Status: SHIPPED | OUTPATIENT
Start: 2024-11-18 | End: 2024-12-18

## 2024-11-18 RX ORDER — DICYCLOMINE HYDROCHLORIDE 10 MG/1
20 CAPSULE ORAL
Qty: 240 CAPSULE | Refills: 0 | Status: SHIPPED | OUTPATIENT
Start: 2024-11-18 | End: 2024-12-18

## 2024-11-18 RX ORDER — INSULIN GLARGINE 100 [IU]/ML
12 INJECTION, SOLUTION SUBCUTANEOUS EVERY MORNING
Qty: 10 ML | Refills: 0 | Status: SHIPPED | OUTPATIENT
Start: 2024-11-19

## 2024-11-18 RX ADMIN — NYSTATIN 500000 UNITS: 100000 SUSPENSION ORAL at 08:25

## 2024-11-18 RX ADMIN — PREGABALIN 150 MG: 75 CAPSULE ORAL at 08:25

## 2024-11-18 RX ADMIN — ACETAMINOPHEN 975 MG: 325 TABLET, FILM COATED ORAL at 14:27

## 2024-11-18 RX ADMIN — HYDROMORPHONE HYDROCHLORIDE 0.5 MG: 1 INJECTION, SOLUTION INTRAMUSCULAR; INTRAVENOUS; SUBCUTANEOUS at 10:38

## 2024-11-18 RX ADMIN — INSULIN GLARGINE 12 UNITS: 100 INJECTION, SOLUTION SUBCUTANEOUS at 08:26

## 2024-11-18 RX ADMIN — HEPARIN 300 UNITS: 100 SYRINGE at 16:19

## 2024-11-18 RX ADMIN — TACROLIMUS 2 MG: 0.5 CAPSULE ORAL at 08:23

## 2024-11-18 RX ADMIN — SUCRALFATE 1 G: 1 TABLET ORAL at 11:52

## 2024-11-18 RX ADMIN — BUDESONIDE AND FORMOTEROL FUMARATE DIHYDRATE 2 PUFF: 160; 4.5 AEROSOL RESPIRATORY (INHALATION) at 08:22

## 2024-11-18 RX ADMIN — PANCRELIPASE 48000 UNITS: 120000; 24000; 76000 CAPSULE, DELAYED RELEASE PELLETS ORAL at 11:52

## 2024-11-18 RX ADMIN — APIXABAN 5 MG: 5 TABLET, FILM COATED ORAL at 08:25

## 2024-11-18 RX ADMIN — ATOVAQUONE 1500 MG: 750 SUSPENSION ORAL at 08:26

## 2024-11-18 RX ADMIN — DICYCLOMINE HYDROCHLORIDE 20 MG: 10 CAPSULE ORAL at 11:52

## 2024-11-18 RX ADMIN — PANCRELIPASE 48000 UNITS: 120000; 24000; 76000 CAPSULE, DELAYED RELEASE PELLETS ORAL at 08:24

## 2024-11-18 RX ADMIN — AZITHROMYCIN 250 MG: 250 TABLET, FILM COATED ORAL at 08:24

## 2024-11-18 RX ADMIN — PREDNISONE 15 MG: 5 TABLET ORAL at 08:25

## 2024-11-18 RX ADMIN — PANTOPRAZOLE SODIUM 40 MG: 40 TABLET, DELAYED RELEASE ORAL at 03:17

## 2024-11-18 RX ADMIN — VALACYCLOVIR HYDROCHLORIDE 500 MG: 500 TABLET, FILM COATED ORAL at 08:24

## 2024-11-18 RX ADMIN — SUCRALFATE 1 G: 1 TABLET ORAL at 03:17

## 2024-11-18 RX ADMIN — CEFTRIAXONE SODIUM 1000 MG: 10 INJECTION, POWDER, FOR SOLUTION INTRAVENOUS at 13:24

## 2024-11-18 RX ADMIN — INSULIN LISPRO 8 UNITS: 100 INJECTION, SOLUTION INTRAVENOUS; SUBCUTANEOUS at 11:54

## 2024-11-18 RX ADMIN — ALBUTEROL SULFATE 5 MG: 2.5 SOLUTION RESPIRATORY (INHALATION) at 12:06

## 2024-11-18 RX ADMIN — GUAIFENESIN 1200 MG: 600 TABLET ORAL at 08:24

## 2024-11-18 RX ADMIN — OXYCODONE HYDROCHLORIDE 20 MG: 10 TABLET ORAL at 08:24

## 2024-11-18 RX ADMIN — ACETAMINOPHEN 975 MG: 325 TABLET, FILM COATED ORAL at 03:16

## 2024-11-18 RX ADMIN — OXYCODONE HYDROCHLORIDE 20 MG: 10 TABLET ORAL at 03:17

## 2024-11-18 RX ADMIN — OXYCODONE HYDROCHLORIDE 20 MG: 10 TABLET ORAL at 12:59

## 2024-11-18 RX ADMIN — HYDROMORPHONE HYDROCHLORIDE 0.5 MG: 1 INJECTION, SOLUTION INTRAMUSCULAR; INTRAVENOUS; SUBCUTANEOUS at 05:18

## 2024-11-18 RX ADMIN — INSULIN LISPRO 5 UNITS: 100 INJECTION, SOLUTION INTRAVENOUS; SUBCUTANEOUS at 08:30

## 2024-11-18 RX ADMIN — NYSTATIN 500000 UNITS: 100000 SUSPENSION ORAL at 11:52

## 2024-11-18 NOTE — ASSESSMENT & PLAN NOTE
Transplant x 2, now with only left functioning lung and chronic rejection on tacrolimus/prednisone

## 2024-11-18 NOTE — ASSESSMENT & PLAN NOTE
Maintained chronically on oral oxycodone 10 mg every 4 hours as needed  Increased to 15 mg or 20 mg for moderate to severe pain while hospitalized  Continue IV Dilaudid for breakthrough  Continue aqua K  Patient appears comfortable but is still requesting additional pain medication for costochondritis like symptoms. Education and emotional support continued  Avoid increase in pain medication if at all possible

## 2024-11-18 NOTE — PROGRESS NOTES
Progress Note - Infectious Disease   Name: Maximilian Mann 48 y.o. female I MRN: [id number]  Unit/Bed#: -01 I Date of Admission: 11/10/2024   Date of Service: 11/18/2024 I Hospital Day: 6     Assessment & Plan  Positive culture findings in sputum  Patient initially presented with idiopathic swelling of the face and neck consistent with prior episodes of angioedema.  Sputum cultures were obtained early on with some of the patient's upper respiratory symptoms and Pro-Seth was obtained which was elevated.  Initial CT chest was without any definitive infiltrate.  Case was discussed with outpatient transplant providers, particularly about the concern for colonization in the airway in the setting of her uncontrolled reflux which may be driving a majority of her upper airway symptoms.  Discussed completing 7 day treatment course. No new/developing infiltrate seen on repeated CT chest 11/16.  - Complete 7 day course of ceftriaxone (last dose ordered for today)  - Additional supportive cares per primary  - Follow up with transplant team    GERD (gastroesophageal reflux disease)  Patient has gastroparesis as well as poorly controlled GERD.  She had fundoplication in the past which on recent imaging seems to have failed given recurrence of symptoms and sliding hiatal hernia noted.  Patient had exacerbation of this condition with recent doxycycline course.  I suspect that this is also contributing to recurrent colonization of the airway and also ongoing upper respiratory symptoms.  Lower suspicion for true pneumonia as above.  - Complete antibiotic course as above  - Additional supportive cares per primary  Hx of lung transplant (HCC)  Patient previously underwent double lung transplant in 2013.  She required retransplant of the left lung in 2017.  Course was then complicated by need for right-sided pneumonectomy in 2022 given chronic progressive infections.  She remains on regimen with tacrolimus and prednisone.   Prophylactic medications and dosing reviewed.  - Continue chronic atovaquone, Valtrex and azithromycin  - Continue immunosuppressive therapy otherwise  - Monitor closely for any drug drug interactions  - Close follow-up with transplant center  Type 1 diabetes mellitus with diabetic chronic kidney disease, unspecified CKD stage (HCC)  Hemoglobin A1c of 7.5.  Creatinine appears to be at baseline.  This can however impact medication dosing.  This may also affect patient's Pro-Seth  - Ongoing glucose management per primary    I have discussed the above management plan in detail with the primary service WAYNE Johnson, who agrees with completing antibiotic course today.    OK for discharge from ID standpoint.    I have performed an extensive review of the medical records in Epic including review of the notes, radiographs, and laboratory results.      Antibiotics:  Total day 7    24 Hour Events:      Subjective:  Feeling well. Still has cough, sometimes productive.  No fevers or chills.  Overall feels better than on admission.  Would like to keep recovering at home at this point.    Objective:  Vitals:  Temp:  [98.5 °F (36.9 °C)-98.6 °F (37 °C)] 98.5 °F (36.9 °C)  HR:  [73-84] 73  Resp:  [18-20] 18  BP: (124-130)/(78-82) 124/78  SpO2:  [92 %-93 %] 92 %  Temp (24hrs), Av.5 °F (36.9 °C), Min:98.5 °F (36.9 °C), Max:98.6 °F (37 °C)  Current: Temperature: 98.5 °F (36.9 °C)      Physical Exam:   General:  Well developed, no acute distress  HEENT:  Normocephalic, sclera anicteric, MMM  Heart:  RRR, no murmurs, rubs, or gallops  Lungs:  Scant wheeze in the left lung, otherwise clear; right pneumonectomy  Abdomen:  Soft, nontender, nondistended, normal bowel sounds  Extremities:  No cyanosis or edema  Skin:  No rashes, lesions on exposed skin  Neuro:  Awake, alert, interactive        Labs:   All pertinent labs and imaging studies were personally reviewed  Results from last 7 days   Lab Units 24  0519 24  0537  11/16/24  1354   WBC Thousand/uL 5.23 4.97 9.18   HEMOGLOBIN g/dL 8.9* 9.0* 9.5*   PLATELETS Thousands/uL 143* 126* 133*     Results from last 7 days   Lab Units 11/18/24  0519 11/17/24  0537 11/16/24  1354 11/13/24  0355 11/12/24  0520   SODIUM mmol/L 139 139 136   < > 134*   POTASSIUM mmol/L 3.8 4.1 5.0   < > 5.1   CHLORIDE mmol/L 111* 109* 107   < > 101   CO2 mmol/L 24 25 25   < > 29   BUN mg/dL 26* 25 36*   < > 21   CREATININE mg/dL 0.67 0.71 0.92   < > 0.93   EGFR ml/min/1.73sq m 105 102 74   < > 73   CALCIUM mg/dL 6.5* 7.3* 8.7   < > 8.0*   AST U/L  --  23  --   --  14   ALT U/L  --  7  --   --  19   ALK PHOS U/L  --  56  --   --  63    < > = values in this interval not displayed.     Results from last 7 days   Lab Units 11/17/24  0537 11/16/24  1354 11/13/24  0355 11/12/24  0520   PROCALCITONIN ng/ml 0.35* 0.42* 4.33* 9.85*                   Microbiology:  Results from last 7 days   Lab Units 11/11/24  1205   SPUTUM CULTURE  4+ Growth of Streptococcus agalactiae (Group B)*  4+ Growth of  4+ Growth of Candida albicans*   GRAM STAIN RESULT  1+ Epithelial cells per low power field*  3+ Gram positive cocci in pairs*  No polys seen*       Imaging:  No new imaging.      Jai Bonner MD  [facility]

## 2024-11-18 NOTE — CASE MANAGEMENT
Case Management Discharge Planning Note    Patient name Maximilian Mann  Location /-01 MRN [id number]  : 1975 Date 2024       Current Admission Date: 11/10/2024  Current Admission Diagnosis:Cystic fibrosis (HCC)   Patient Active Problem List    Diagnosis Date Noted Date Diagnosed    Positive culture findings in sputum 2024     Chest pain 2024     Pancytopenia, acquired ([facility]) 10/24/2024     History of pulmonary embolus (PE) 2024     History of DVT (deep vein thrombosis) 2024     Sore throat 2024     Vaginal yeast infection 2024     Intestinal metaplasia of antrum of stomach without dysplasia 2024     Hx of lung transplant ([facility]) 01/15/2024     Continuous opioid dependence ([facility]) 01/15/2024     Stage 3a chronic kidney disease ([facility]) 2023     Depressed mood 2023     Chronic pancreatitis, unspecified pancreatitis type ([facility]) 2023     Pulmonary embolus (HCC) 2022     Right leg pain 10/13/2022     Chronic cough 2022     Renal calculus, left 2022     Pulmonary nodules 2022     Rib pain 2022     Gastroparesis 2022     Ulcerative pancolitis without complication ([facility]) 2022     Neuropathic pain of right foot 01/10/2022     Weakness of right lower extremity 2021     Pulmonary hypertension (HCC) 2021     Cystic fibrosis ([facility]) 2021     Essential hypertension 2021     Hypercholesteremia 2021     Long term current use of bisphosphonates 2020     Long term current use of systemic steroids 2020     Osteopenia 2020     Chronic pain disorder 2020     Asthma 2020     Nausea and vomiting 2020     Hiatal hernia 2019     Anemia 2018     DVT (deep venous thrombosis) ([facility]) 2018     Immunosuppression ([facility]) 2017     Colon polyp, personal history 2016     S/P pneumonectomy 2013     Anxiety 2012     Type 1  diabetes mellitus with diabetic chronic kidney disease, unspecified CKD stage (HCC) 10/28/2010     Pancreatic insufficiency 10/28/2010     Chronic sinusitis 10/28/2010     GERD (gastroesophageal reflux disease) 10/28/2010       LOS (days): 6  Geometric Mean LOS (GMLOS) (days): 3.7  Days to GMLOS:-2.4     OBJECTIVE:  Risk of Unplanned Readmission Score: 50.34         Current admission status: Inpatient   Preferred Pharmacy:   [facility] - [place], [place] [address] [facility]  [address]  [place] [address]  Phone: [phone] Fax: [phone]    [facility]pharm[facility] #[id number] - [place] - [address]  [address]  [place] [address]  Phone: [phone] Fax: [phone]    [facility]/pharmacy #[id number] - [place] - [address]  [address]  [place] [address]  Phone: [phone] Fax: [phone]    Primary Care Provider: Solis Avila MD    Primary Insurance: BLUE CROSS  Secondary Insurance: MEDICARE    DISCHARGE DETAILS:                                          Other Referral/Resources/Interventions Provided:  Referral Comments: Pt refusing biopsy.  Will be d/mario home-no anticipated d/c needs         Treatment Team Recommendation: Home  Discharge Destination Plan:: Home  Transport at Discharge : Family

## 2024-11-18 NOTE — NURSING NOTE
AVS reviewed with pt. No questions or concerns at this time. Port de accessed by charge RN. All belongings with pt and accounted for. Pt's spouse to transport home.

## 2024-11-18 NOTE — PLAN OF CARE
Problem: PAIN - ADULT  Goal: Verbalizes/displays adequate comfort level or baseline comfort level  Description: Interventions:  - Encourage patient to monitor pain and request assistance  - Assess pain using appropriate pain scale  - Administer analgesics based on type and severity of pain and evaluate response  - Implement non-pharmacological measures as appropriate and evaluate response  - Consider cultural and social influences on pain and pain management  - Notify physician/advanced practitioner if interventions unsuccessful or patient reports new pain  Outcome: Progressing     Problem: INFECTION - ADULT  Goal: Absence or prevention of progression during hospitalization  Description: INTERVENTIONS:  - Assess and monitor for signs and symptoms of infection  - Monitor lab/diagnostic results  - Monitor all insertion sites, i.e. indwelling lines, tubes, and drains  - Monitor endotracheal if appropriate and nasal secretions for changes in amount and color  - Anderson appropriate cooling/warming therapies per order  - Administer medications as ordered  - Instruct and encourage patient and family to use good hand hygiene technique  - Identify and instruct in appropriate isolation precautions for identified infection/condition  Outcome: Progressing     Problem: SAFETY ADULT  Goal: Patient will remain free of falls  Description: INTERVENTIONS:  - Educate patient/family on patient safety including physical limitations  - Instruct patient to call for assistance with activity   - Consult OT/PT to assist with strengthening/mobility   - Keep Call bell within reach  - Keep bed low and locked with side rails adjusted as appropriate  - Keep care items and personal belongings within reach  - Initiate and maintain comfort rounds  - Make Fall Risk Sign visible to staff  - Offer Toileting every Hours, in advance of need  - Initiate/Maintain alarm  - Obtain necessary fall risk management equipment:   - Apply yellow socks and  bracelet for high fall risk patients  - Consider moving patient to room near nurses station  Outcome: Progressing

## 2024-11-18 NOTE — ASSESSMENT & PLAN NOTE
Blood Sugar Average: Last 72 hrs:(P) 185.4375    Continue consistent carb diet   Continue Lantus 12 units daily, continue mealtime insulin with Humalog 10 units with breakfast 8 units with lunch and 12 units with dinner   Continue sliding scale coverage with ACHS checks  Continue hypoglycemia protocol

## 2024-11-18 NOTE — PROGRESS NOTES
"Progress Note - Hospitalist   Name: Maximilian Mann 48 y.o. female I MRN: [id number]  Unit/Bed#: -01 I Date of Admission: 11/10/2024   Date of Service: 11/18/2024 I Hospital Day: 6    Assessment & Plan  Cystic fibrosis ([facility])  Presented for facial swelling, this is resolved. History of cystic fibrosis s/p 2 previous lung transplant with chronic rejection on tacrolimus/prednisone. Patient reports she is \"end-stage\" at this point, and not a candidate for another transplant  Continue chronic Mepron/Zithromax/Valtrex  SLIM provider spoke to Frances from [facility] transplant team, low threshold to treat for superimposed infection  Discussed again with Frances Camarillo 11/15/2024 from [facility], initially recommending 14 days of IV antibiotics, however now agreeable to ID recommendations  Repeat procal 0.42, 0.35  Patient remains afebrile without leukocytosis  Sputum culture +GBS, antibiotics transitioned to IV Rocephin  Infectious disease consulted, appreciate input  May complete 7 days antibiotics to end on 11/18/2024  Repeat CT chest: Left lung transplant with no consolidation. Mild chronic tree-in-bud nodularity in the left lung, infectious/inflammatory  Chronic pain disorder  Maintained chronically on oral oxycodone 10 mg every 4 hours as needed  Increased to 15 mg or 20 mg for moderate to severe pain while hospitalized  Continue IV Dilaudid for breakthrough  Continue aqua K  Patient appears comfortable but is still requesting additional pain medication for costochondritis like symptoms. Education and emotional support continued  Avoid increase in pain medication if at all possible  Hx of lung transplant ([facility])  Transplant x 2, now with only left functioning lung and chronic rejection on tacrolimus/prednisone  History of pulmonary embolus (PE)  Continue Eliquis  Type 1 diabetes mellitus with diabetic chronic kidney disease, unspecified CKD stage ([facility])  Blood Sugar Average: Last 72 hrs:(P) " [id number]    Continue consistent carb diet   Continue Lantus 12 units daily, continue mealtime insulin with Humalog 10 units with breakfast 8 units with lunch and 12 units with dinner   Continue sliding scale coverage with ACHS checks  Continue hypoglycemia protocol  GERD (gastroesophageal reflux disease)  Continue Pepcid, add pantoprazole and carafate due to esophagitis on imaging. Can trial bentyl as well  Is due to EGD as well as Bravo pH test  Positive culture findings in sputum  Continue IV Rocephin  ID following    VTE Pharmacologic Prophylaxis: VTE Score: 1 Moderate Risk (Score 3-4) - Pharmacological DVT Prophylaxis Ordered: apixaban (Eliquis). Eliquis    Mobility:   Basic Mobility Inpatient Raw Score: 24  JH-HLM Goal: 8: Walk 250 feet or more  JH-HLM Achieved: 6: Walk 10 steps or more  JH-HLM Goal NOT achieved. Continue with multidisciplinary rounding and encourage appropriate mobility to improve upon JH-HLM goals.    Patient Centered Rounds: I performed bedside rounds with nursing staff today.   Discussions with Specialists or Other Care Team Provider: Case management    Education and Discussions with Family / Patient: Patient declined call to .     Current Length of Stay: 6 day(s)  Current Patient Status: Inpatient   Certification Statement: The patient will continue to require additional inpatient hospital stay due to IV antibiotics.  Discharge Plan: Anticipate discharge in 24-48 hrs to home.    Code Status: Level 1 - Full Code    Subjective   Patient evaluated at bedside.  She says she is having some right rib pain and asking for pain medicine.  She also like a nebulizer treatment.  She is hoping to go home after antibiotics this evening.    Objective :  Temp:  [98.5 °F (36.9 °C)-98.6 °F (37 °C)] 98.5 °F (36.9 °C)  HR:  [73-84] 73  BP: (124-130)/(78-82) 124/78  Resp:  [18-20] 18  SpO2:  [92 %-93 %] 92 %  O2 Device: None (Room air)    Body mass index is 21.03 kg/m².     Input and  Output Summary (last 24 hours):     Intake/Output Summary (Last 24 hours) at 11/18/2024 1011  Last data filed at 11/17/2024 1130  Gross per 24 hour   Intake 240 ml   Output --   Net 240 ml       Physical Exam  Vitals and nursing note reviewed.   Constitutional:       General: She is not in acute distress.     Appearance: She is underweight. She is not ill-appearing.   HENT:      Head: Normocephalic and atraumatic.      Mouth/Throat:      Pharynx: Oropharynx is clear.   Eyes:      Pupils: Pupils are equal, round, and reactive to light.   Cardiovascular:      Rate and Rhythm: Normal rate and regular rhythm.      Pulses: Normal pulses.      Heart sounds: Normal heart sounds.      Comments: Mediport in place, right chest wall.  Heart sounds auscultated in right chest due to transplanted lung  Pulmonary:      Effort: Pulmonary effort is normal. No respiratory distress.      Breath sounds: Rhonchi present.   Abdominal:      General: Bowel sounds are normal.      Palpations: Abdomen is soft.      Tenderness: There is no abdominal tenderness.   Musculoskeletal:      Cervical back: Neck supple.      Right lower leg: No edema.      Left lower leg: No edema.   Skin:     General: Skin is warm and dry.      Capillary Refill: Capillary refill takes less than 2 seconds.   Neurological:      General: No focal deficit present.      Mental Status: She is alert and oriented to person, place, and time. Mental status is at baseline.         Lines/Drains:  Lines/Drains/Airways       Active Status       Name Placement date Placement time Site Days    Port A Cath 11/25/22 Right Chest 11/25/22  0953  Chest  724                    Central Line:  Goal for removal: N/A - Chronic PICC               Lab Results: I have reviewed the following results:   Results from last 7 days   Lab Units 11/18/24  0519 11/17/24  0537   WBC Thousand/uL 5.23 4.97   HEMOGLOBIN g/dL 8.9* 9.0*   HEMATOCRIT % 31.2* 32.4*   PLATELETS Thousands/uL 143* 126*   SEGS PCT  %  --  48   LYMPHO PCT %  --  34   MONO PCT %  --  13*   EOS PCT %  --  4     Results from last 7 days   Lab Units 11/18/24  0519 11/17/24  0537   SODIUM mmol/L 139 139   POTASSIUM mmol/L 3.8 4.1   CHLORIDE mmol/L 111* 109*   CO2 mmol/L 24 25   BUN mg/dL 26* 25   CREATININE mg/dL 0.67 0.71   ANION GAP mmol/L 4 5   CALCIUM mg/dL 6.5* 7.3*   ALBUMIN g/dL  --  2.9*   TOTAL BILIRUBIN mg/dL  --  0.18*   ALK PHOS U/L  --  56   ALT U/L  --  7   AST U/L  --  23   GLUCOSE RANDOM mg/dL 158* 141*         Results from last 7 days   Lab Units 11/18/24  0642 11/17/24  2131 11/17/24  1618 11/17/24  1232 11/17/24  1038 11/17/24  0609 11/16/24  2032 11/16/24  1813 11/16/24  1554 11/16/24  1103 11/16/24  0617 11/15/24  2004   POC GLUCOSE mg/dl 131 221* 192* 117 87 192* 275* 376* 296* 57* 209* 108         Results from last 7 days   Lab Units 11/17/24  0537 11/16/24  1354 11/13/24  0355 11/12/24  0520   PROCALCITONIN ng/ml 0.35* 0.42* 4.33* 9.85*       Recent Cultures (last 7 days):   Results from last 7 days   Lab Units 11/11/24  1205   SPUTUM CULTURE  4+ Growth of Streptococcus agalactiae (Group B)*  4+ Growth of  4+ Growth of Candida albicans*   GRAM STAIN RESULT  1+ Epithelial cells per low power field*  3+ Gram positive cocci in pairs*  No polys seen*       Imaging Results Review: I reviewed radiology reports from this admission including: CT chest.  Other Study Results Review: No additional pertinent studies reviewed.    Last 24 Hours Medication List:     Current Facility-Administered Medications:     acetaminophen (TYLENOL) tablet 650 mg, Q6H PRN    acetaminophen (TYLENOL) tablet 975 mg, Q8H DOTTY    albuterol inhalation solution 5 mg, Q4H PRN    ALPRAZolam (XANAX) tablet 1 mg, HS PRN    apixaban (ELIQUIS) tablet 5 mg, BID    atovaquone (MEPRON) oral suspension 1,500 mg, Daily    azithromycin (ZITHROMAX) tablet 250 mg, Once per day on Monday Wednesday Friday    budesonide-formoterol (SYMBICORT) 160-4.5 mcg/act inhaler 2 puff,  Q12H    calcium carbonate (TUMS) chewable tablet 500 mg, Daily PRN    cefTRIAXone (ROCEPHIN) 1,000 mg in dextrose 5 % 50 mL IVPB, Q24H, Last Rate: 1,000 mg (11/17/24 1813)    cyclobenzaprine (FLEXERIL) tablet 5 mg, TID PRN    dicyclomine (BENTYL) capsule 20 mg, 4x Daily (AC & HS)    diphenhydrAMINE (BENADRYL) injection 25 mg, Q6H PRN    famotidine (PEPCID) tablet 40 mg, HS    guaiFENesin (MUCINEX) 12 hr tablet 1,200 mg, Q12H DOTTY    HYDROmorphone (DILAUDID) injection 0.5 mg, Q4H PRN    insulin glargine (LANTUS) subcutaneous injection 12 Units 0.12 mL, QAM    insulin lispro (HumALOG/ADMELOG) 100 units/mL subcutaneous injection 10 Units, Daily With Breakfast    insulin lispro (HumALOG/ADMELOG) 100 units/mL subcutaneous injection 12 Units, Daily With Dinner    insulin lispro (HumALOG/ADMELOG) 100 units/mL subcutaneous injection 2-7 Units, TID AC **AND** Fingerstick Glucose (POCT), TID AC    insulin lispro (HumALOG/ADMELOG) 100 units/mL subcutaneous injection 8 Units, Daily With Lunch    metoclopramide (REGLAN) injection 5 mg, Q6H DOTTY    naloxone (NARCAN) 0.04 mg/mL syringe 0.04 mg, Q1MIN PRN    nystatin (MYCOSTATIN) oral suspension 500,000 Units, 4x Daily    oxyCODONE (ROXICODONE) IR tablet 15 mg, Q4H PRN **AND** oxyCODONE (ROXICODONE) immediate release tablet 20 mg, Q4H PRN    pancrelipase (Lip-Prot-Amyl) (CREON) delayed release capsule 24,000 Units, TID PRN    pancrelipase (Lip-Prot-Amyl) (CREON) delayed release capsule 48,000 Units, TID With Meals    pantoprazole (PROTONIX) EC tablet 40 mg, BID AC    polyethylene glycol (MIRALAX) packet 17 g, Daily PRN    predniSONE tablet 15 mg, Daily    pregabalin (LYRICA) capsule 150 mg, BID    sucralfate (CARAFATE) tablet 1 g, TID AC    tacrolimus (PROGRAF) capsule 2 mg, BID    valACYclovir (VALTREX) tablet 500 mg, Daily    Administrative Statements   Today, Patient Was Seen By: WAYNE Saavedra  I have spent a total time of 40 minutes in caring for this patient on the day  of the visit/encounter including Diagnostic results, Risks and benefits of tx options, Instructions for management, Patient and family education, Importance of tx compliance, Risk factor reductions, Counseling / Coordination of care, Documenting in the medical record, Reviewing / ordering tests, medicine, procedures  , Obtaining or reviewing history  , and Communicating with other healthcare professionals .    **Please Note: This note may have been constructed using a voice recognition system.**

## 2024-11-18 NOTE — ASSESSMENT & PLAN NOTE
Hemoglobin A1c of 7.5.  Creatinine appears to be at baseline.  This can however impact medication dosing.  This may also affect patient's Pro-Seth  - Ongoing glucose management per primary

## 2024-11-18 NOTE — ASSESSMENT & PLAN NOTE
"Presented for facial swelling, this is resolved. History of cystic fibrosis s/p 2 previous lung transplant with chronic rejection on tacrolimus/prednisone. Patient reports she is \"end-stage\" at this point, and not a candidate for another transplant  Continue chronic Mepron/Zithromax/Valtrex  SLIM provider spoke to Frances from [facility] transplant team, low threshold to treat for superimposed infection  Discussed again with Frances Camarillo 11/15/2024 from [facility], initially recommending 14 days of IV antibiotics, however now agreeable to ID recommendations  Repeat procal 0.42, 0.35  Patient remains afebrile without leukocytosis  Sputum culture +GBS, antibiotics transitioned to IV Rocephin  Infectious disease consulted, appreciate input  May complete 7 days antibiotics to end on 11/18/2024  Repeat CT chest: Left lung transplant with no consolidation. Mild chronic tree-in-bud nodularity in the left lung, infectious/inflammatory  "

## 2024-11-18 NOTE — ASSESSMENT & PLAN NOTE
Patient previously underwent double lung transplant in 2013.  She required retransplant of the left lung in 2017.  Course was then complicated by need for right-sided pneumonectomy in 2022 given chronic progressive infections.  She remains on regimen with tacrolimus and prednisone.  Prophylactic medications and dosing reviewed.  - Continue chronic atovaquone, Valtrex and azithromycin  - Continue immunosuppressive therapy otherwise  - Monitor closely for any drug drug interactions  - Close follow-up with transplant center

## 2024-11-18 NOTE — ASSESSMENT & PLAN NOTE
Maintained chronically on oral oxycodone 10 mg every 4 hours as needed  Increased to 15 mg or 20 mg for moderate to severe pain while hospitalized  Patient appears comfortable but is still requesting additional pain medication for costochondritis like symptoms. Education and emotional support continued  Avoid increase in pain medication if at all possible

## 2024-11-18 NOTE — ASSESSMENT & PLAN NOTE
Continue Pepcid, add pantoprazole and carafate due to esophagitis on imaging. Can trial bentyl as well  Is due to have EGD as well as Bravo pH test

## 2024-11-18 NOTE — ASSESSMENT & PLAN NOTE
Patient initially presented with idiopathic swelling of the face and neck consistent with prior episodes of angioedema.  Sputum cultures were obtained early on with some of the patient's upper respiratory symptoms and Pro-Seth was obtained which was elevated.  Initial CT chest was without any definitive infiltrate.  Case was discussed with outpatient transplant providers, particularly about the concern for colonization in the airway in the setting of her uncontrolled reflux which may be driving a majority of her upper airway symptoms.  Discussed completing 7 day treatment course. No new/developing infiltrate seen on repeated CT chest 11/16.  - Complete 7 day course of ceftriaxone (last dose ordered for today)  - Additional supportive cares per primary  - Follow up with transplant team

## 2024-11-18 NOTE — PLAN OF CARE
Problem: Potential for Falls  Goal: Patient will remain free of falls  Description: INTERVENTIONS:  - Educate patient/family on patient safety including physical limitations  - Instruct patient to call for assistance with activity   - Consult OT/PT to assist with strengthening/mobility   - Keep Call bell within reach  - Keep bed low and locked with side rails adjusted as appropriate  - Keep care items and personal belongings within reach  - Initiate and maintain comfort rounds  - Make Fall Risk Sign visible to staff  - Offer Toileting every  Hours, in advance of need  - Initiate/Maintain alarm  - Obtain necessary fall risk management equipment:   - Apply yellow socks and bracelet for high fall risk patients  - Consider moving patient to room near nurses station  Outcome: Progressing     Problem: PAIN - ADULT  Goal: Verbalizes/displays adequate comfort level or baseline comfort level  Description: Interventions:  - Encourage patient to monitor pain and request assistance  - Assess pain using appropriate pain scale  - Administer analgesics based on type and severity of pain and evaluate response  - Implement non-pharmacological measures as appropriate and evaluate response  - Consider cultural and social influences on pain and pain management  - Notify physician/advanced practitioner if interventions unsuccessful or patient reports new pain  Outcome: Progressing     Problem: INFECTION - ADULT  Goal: Absence or prevention of progression during hospitalization  Description: INTERVENTIONS:  - Assess and monitor for signs and symptoms of infection  - Monitor lab/diagnostic results  - Monitor all insertion sites, i.e. indwelling lines, tubes, and drains  - Monitor endotracheal if appropriate and nasal secretions for changes in amount and color  - [place]uttle appropriate cooling/warming therapies per order  - Administer medications as ordered  - Instruct and encourage patient and family to use good hand hygiene technique  -  Identify and instruct in appropriate isolation precautions for identified infection/condition  Outcome: Progressing  Goal: Absence of fever/infection during neutropenic period  Description: INTERVENTIONS:  - Monitor WBC    Outcome: Progressing     Problem: SAFETY ADULT  Goal: Patient will remain free of falls  Description: INTERVENTIONS:  - Educate patient/family on patient safety including physical limitations  - Instruct patient to call for assistance with activity   - Consult OT/PT to assist with strengthening/mobility   - Keep Call bell within reach  - Keep bed low and locked with side rails adjusted as appropriate  - Keep care items and personal belongings within reach  - Initiate and maintain comfort rounds  - Make Fall Risk Sign visible to staff  - Offer Toileting every  Hours, in advance of need  - Initiate/Maintain alarm  - Obtain necessary fall risk management equipment:   - Apply yellow socks and bracelet for high fall risk patients  - Consider moving patient to room near nurses station  Outcome: Progressing  Goal: Maintain or return to baseline ADL function  Description: INTERVENTIONS:  -  Assess patient's ability to carry out ADLs; assess patient's baseline for ADL function and identify physical deficits which impact ability to perform ADLs (bathing, care of mouth/teeth, toileting, grooming, dressing, etc.)  - Assess/evaluate cause of self-care deficits   - Assess range of motion  - Assess patient's mobility; develop plan if impaired  - Assess patient's need for assistive devices and provide as appropriate  - Encourage maximum independence but intervene and supervise when necessary  - Involve family in performance of ADLs  - Assess for home care needs following discharge   - Consider OT consult to assist with ADL evaluation and planning for discharge  - Provide patient education as appropriate  Outcome: Progressing  Goal: Maintains/Returns to pre admission functional level  Description:  INTERVENTIONS:  - Perform AM-PAC 6 Click Basic Mobility/ Daily Activity assessment daily.  - Set and communicate daily mobility goal to care team and patient/family/caregiver.   - Collaborate with rehabilitation services on mobility goals if consulted  - Perform Range of Motion  times a day.  - Reposition patient every  hours.  - Dangle patient  times a day  - Stand patient  times a day  - Ambulate patient  times a day  - Out of bed to chair  times a day   - Out of bed for meals  times a day  - Out of bed for toileting  - Record patient progress and toleration of activity level   Outcome: Progressing     Problem: RESPIRATORY - ADULT  Goal: Achieves optimal ventilation and oxygenation  Description: INTERVENTIONS:  - Assess for changes in respiratory status  - Assess for changes in mentation and behavior  - Position to facilitate oxygenation and minimize respiratory effort  - Oxygen administered by appropriate delivery if ordered  - Initiate smoking cessation education as indicated  - Encourage broncho-pulmonary hygiene including cough, deep breathe, Incentive Spirometry  - Assess the need for suctioning and aspirate as needed  - Assess and instruct to report SOB or any respiratory difficulty  - Respiratory Therapy support as indicated  Outcome: Progressing

## 2024-11-18 NOTE — ASSESSMENT & PLAN NOTE
"Presented for facial swelling, this is resolved. History of cystic fibrosis s/p 2 previous lung transplant with chronic rejection on tacrolimus/prednisone. Patient reports she is \"end-stage\" at this point, and not a candidate for another transplant  Continue chronic Mepron/Zithromax/Valtrex  SLIM provider spoke to Frances from [facility] transplant team, low threshold to treat for superimposed infection  Discussed again with Frances Camarillo 11/15/2024 from [facility], initially recommending 14 days of IV antibiotics, however now agreeable to ID recommendations  Repeat procal 0.42, 0.35  Patient remains afebrile without leukocytosis  Sputum culture +GBS, antibiotics transitioned to IV Rocephin  Infectious disease consulted, appreciate input  Completed 7 days antibiotics 11/18/2024  Repeat CT chest: Left lung transplant with no consolidation. Mild chronic tree-in-bud nodularity in the left lung, infectious/inflammatory  Discharged to home in stable condition accompanied by her   "

## 2024-11-18 NOTE — ASSESSMENT & PLAN NOTE
Patient has gastroparesis as well as poorly controlled GERD.  She had fundoplication in the past which on recent imaging seems to have failed given recurrence of symptoms and sliding hiatal hernia noted.  Patient had exacerbation of this condition with recent doxycycline course.  I suspect that this is also contributing to recurrent colonization of the airway and also ongoing upper respiratory symptoms.  Lower suspicion for true pneumonia as above.  - Complete antibiotic course as above  - Additional supportive cares per primary

## 2024-11-18 NOTE — ASSESSMENT & PLAN NOTE
Blood Sugar Average: Last 72 hrs:(P) [id number]    Continue consistent carb diet   Continue Lantus 12 units daily, continue mealtime insulin with Humalog 10 units with breakfast 8 units with lunch and 12 units with dinner   Continue sliding scale coverage with ACHS checks  Continue hypoglycemia protocol

## 2024-11-18 NOTE — DISCHARGE SUMMARY
"Discharge Summary - Hospitalist   Name: Maximilian Mann 48 y.o. female I MRN: [id number]  Unit/Bed#: -01 I Date of Admission: 11/10/2024   Date of Service: 11/18/2024 I Hospital Day: 6     Assessment & Plan  Cystic fibrosis ([facility])  Presented for facial swelling, this is resolved. History of cystic fibrosis s/p 2 previous lung transplant with chronic rejection on tacrolimus/prednisone. Patient reports she is \"end-stage\" at this point, and not a candidate for another transplant  Continue chronic Mepron/Zithromax/Valtrex  SLIM provider spoke to Frances from [facility] transplant team, low threshold to treat for superimposed infection  Discussed again with Frances Camarillo 11/15/2024 from [facility], initially recommending 14 days of IV antibiotics, however now agreeable to ID recommendations  Repeat procal 0.42, 0.35  Patient remains afebrile without leukocytosis  Sputum culture +GBS, antibiotics transitioned to IV Rocephin  Infectious disease consulted, appreciate input  Completed 7 days antibiotics 11/18/2024  Repeat CT chest: Left lung transplant with no consolidation. Mild chronic tree-in-bud nodularity in the left lung, infectious/inflammatory  Discharged to home in stable condition accompanied by her   Chronic pain disorder  Maintained chronically on oral oxycodone 10 mg every 4 hours as needed  Increased to 15 mg or 20 mg for moderate to severe pain while hospitalized  Patient appears comfortable but is still requesting additional pain medication for costochondritis like symptoms. Education and emotional support continued  Avoid increase in pain medication if at all possible  Hx of lung transplant ([facility])  Transplant x 2, now with only left functioning lung and chronic rejection on tacrolimus/prednisone  History of pulmonary embolus (PE)  Continue Eliquis  Type 1 diabetes mellitus with diabetic chronic kidney disease, unspecified CKD stage ([facility])  Blood Sugar Average: Last 72 hrs:(P) 185.4375    Continue " consistent carb diet   Continue Lantus 12 units daily, continue mealtime insulin with Humalog 10 units with breakfast 8 units with lunch and 12 units with dinner   Continue sliding scale coverage with ACHS checks  Continue hypoglycemia protocol  GERD (gastroesophageal reflux disease)  Continue Pepcid, add pantoprazole and carafate due to esophagitis on imaging. Can trial bentyl as well  Is due to have EGD as well as Bravo pH test  Positive culture findings in sputum  Continue IV Rocephin  ID following    Medical Problems       Resolved Problems  Date Reviewed: 10/1/2024          Resolved    Angioedema 11/13/2024     Resolved by  Yolanda Ferrara PA-C        Discharging Physician / Practitioner: WAYNE Saavedra  PCP: Solis Avila MD  Admission Date:   Admission Orders (From admission, onward)       Ordered        11/12/24 1144  INPATIENT ADMISSION  Once            11/10/24 1941  Place in Observation  Once                          Discharge Date: 11/18/24    Consultations During Hospital Stay:  Gastroenterology, infectious disease    Significant Findings / Test Results:   CT chest 11/10/2024: Small left pulmonary nodules most of which appear similar to prior exam however the largest, a 6 mm nodule in left upper lobe, not definitively visualized previously however may be due to differences in technique.  Noncontrast CT is recommended at 6 to 12 months from the initial examination.  Scattered small clusters of tree-in-bud nodules are compatible with infectious/inflammatory airway disease.  Esophageal wall thickening, similar to slightly decreased from prior exam.  Findings may be secondary to persistent esophagitis    Reason for Admission: Facial swelling, abnormality on chest CT in the setting of transplanted lung    Hospital Course:   Maximilian Mann is a 48 y.o. female patient who originally presented to the hospital on 11/10/2024 due to facial swelling.  CT imaging of neck was negative for acute  findings however the scan of chest showed evidence for infectious versus inflammatory process.  She was admitted to medicine for observation.  Her lung transplant team was contacted and involved with her care.  She was started on IV antibiotics.  She was evaluated by infectious disease and gastroenterology.  About a course was completed and patient was discharged home in stable condition to the care of her .  She understands follow-up instructions including following up with her lung specialist as well as gastroenterology as an outpatient.  He is to come back to the hospital immediately for any worsening condition or new concern.    Condition at Discharge: stable    Discharge Day Visit / Exam:   * Please refer to separate progress note for these details *    Discussion with Family: Patient declined call to .     Discharge instructions/Information to patient and family:   See after visit summary for information provided to patient and family.      Provisions for Follow-Up Care:  See after visit summary for information related to follow-up care and any pertinent home health orders.      Mobility at time of Discharge:   Basic Mobility Inpatient Raw Score: 22  JH-HLM Goal: 7: Walk 25 feet or more  JH-HLM Achieved: 7: Walk 25 feet or more  HLM Goal achieved. Continue to encourage appropriate mobility.     Disposition:   Home    Planned Readmission: No    Discharge Medications:  See after visit summary for reconciled discharge medications provided to patient and/or family.      Administrative Statements   Discharge Statement:  I have spent a total time of 32 minutes in caring for this patient on the day of the visit/encounter. >30 minutes of time was spent on: Diagnostic results, Prognosis, Instructions for management, Patient and family education, Importance of tx compliance, Risk factor reductions, Impressions, Counseling / Coordination of care, Documenting in the medical record, Reviewing /  ordering tests, medicine, procedures  , and Communicating with other healthcare professionals .    **Please Note: This note may have been constructed using a voice recognition system**

## 2024-11-19 ENCOUNTER — TRANSITIONAL CARE MANAGEMENT (OUTPATIENT)
Dept: INTERNAL MEDICINE CLINIC | Facility: CLINIC | Age: 49
End: 2024-11-19

## 2024-11-20 ENCOUNTER — TELEPHONE (OUTPATIENT)
Dept: INTERNAL MEDICINE CLINIC | Facility: CLINIC | Age: 49
End: 2024-11-20

## 2024-11-20 NOTE — UTILIZATION REVIEW
Continued Stay Review    Date:  11/15-11/17/24            Current Patient Class: Inpatient  Current Level of Care:  Med Surg    HPI:48 y.o. female initially admitted as Inpatient 11/12/24.     11/15 Internal Medicine:  Patient reports improved pain control.  Switched to cefazolin given GBS on sputum,  final sensitivity to ceftriaxone. Case discussed again with  [facility] transplant team. Will continue IV abx. For 14 days. Patient has poor tolerance for PO abx, will discharge with IV ceftriaxone.  Case Management to assist with setting up home care. Exam: AOx3, rhonchi present.     11/16 Infectious Disease consult:  Patient initially presented with idiopathic swelling of the face and neck consistent with prior episodes of angioedema.  Sputum cultures were obtained early on with some of the patient's upper respiratory symptoms and Pro-Seth was obtained which was elevated.  Initial CT chest was without any definitive infiltrate.  Case was discussed with outpatient transplant providers and patient was initially recommended for prolonged course of antibiotic.  Was able to review and discussed with them today particularly about the concern for colonization in the airway in the setting of her uncontrolled reflux which I suspect is driving a majority of her upper airway symptoms.  We discussed obtaining repeat testing and completing likely short course at this time. Plan:  Can maintain on ceftriaxone for now. Will obtain repeat Pro-Seth today and tomorrow. Repeat CBC/CMP tomorrow to monitor response/dosing. Labs are pending collection today. Requested repeat CT of the chest with primary service to evaluate for developing infiltrate. If imaging/Pro-Seth unremarkable and patient stable, will complete 7 days through 11/18. If imaging abnormal we will consider total 10 days, but will trial transition to oral amoxicillin. No plans at this time for 14-day course of antibiotic. Will continue to remain in touch with transplant providers.  Internal Medicine:  Patient remains afebrile without leukocytosis. Patient reports pain is well controlled. [facility]x however now agreeable ID recommendations. Wxam: AOx3. Rhonchi present.     11/17 Infectious Disease:  Patient  is currently afebrile.  No significant leukocytosis.  Pro-Seth repeated both yesterday and today and it is down trended to 0.35.  This is more than 80% from peak value of 9.8.  No new cultures noted.  CT chest imaging repeated and there is no consolidation within the transplanted lung.  There is only some mild chronic tree-in-bud changes.  Vitals are otherwise stable. At this point we will plan to complete last dose of antibiotic through tomorrow.  Last doses ordered.  Would continue pain management and GERD treatments as per primary.  No plans for more prolonged course of antibiotic given improvement in Pro-Seth and negative CT imaging at this point.  Will plan to reevaluate this patient again tomorrow to ensure that she is symptomatically otherwise stable aside from ongoing upper respiratory issues likely related to her GERD.  Will likely also need to touch base with her transplant provider at [facility] to close the loop.    11/18 D/C to Home/Self Care    Medications:   Scheduled Medications:    acetaminophen, 975 mg, Oral, Q8H DOTTY  apixaban, 5 mg, Oral, BID  atovaquone, 1,500 mg, Oral, Daily  azithromycin, 250 mg, Oral, Once per day on Monday Wednesday Friday  budesonide-formoterol, 2 puff, Inhalation, Q12H  dicyclomine, 20 mg, Oral, 4x Daily (AC & HS)  famotidine, 40 mg, Oral, HS  guaiFENesin, 1,200 mg, Oral, Q12H [facility]  insulin glargine, 12 Units, Subcutaneous, QAM  insulin lispro, 10 Units, Subcutaneous, Daily With Breakfast  insulin lispro, 12 Units, Subcutaneous, Daily With Dinner  insulin lispro, 2-7 Units, Subcutaneous, TID AC  insulin lispro, 8 Units, Subcutaneous, Daily With Lunch  metoclopramide, 5 mg, Intravenous, Q6H [facility]  nystatin, 500,000 Units, Swish  & Swallow, 4x Daily  pancrelipase (Lip-Prot-Amyl), 48,000 Units, Oral, TID With Meals  pantoprazole, 40 mg, Oral, BID AC  predniSONE, 15 mg, Oral, Daily  pregabalin, 150 mg, Oral, BID  sucralfate, 1 g, Oral, TID AC  tacrolimus, 2 mg, Oral, BID  valACYclovir, 500 mg, Oral, Daily      cefTRIAXone (ROCEPHIN) 1,000 mg in dextrose 5 % 50 mL IVPB  Dose: 1,000 mg  Freq: Every 24 hours Route: IV  Last Dose: 1,000 mg (11/17/24 1813)  Start: 11/15/24 1745 End: 11/18/24 1200    ceFAZolin (ANCEF) IVPB (premix in dextrose) 2,000 mg 50 mL  Dose: 2,000 mg  Freq: Every 8 hours Route: IV  Last Dose: 2,000 mg (11/15/24 1415)  Start: 11/12/24 1400 End: 11/15/24 1740         Continuous IV Infusions: None.       PRN Meds:  acetaminophen, 650 mg, Oral, Q6H PRN  albuterol, 5 mg, Nebulization, Q4H PRN  ALPRAZolam, 1 mg, Oral, HS PRN  calcium carbonate, 500 mg, Oral, Daily PRN  cyclobenzaprine, 5 mg, Oral, TID PRN  diphenhydrAMINE, 25 mg, Intravenous, Q6H PRN    HYDROmorphone, 0.5 mg, Intravenous, Q4H PRN x 4 doses 11/15, x 5 doses 11/16, x 5 doses 11/17   naloxone, 0.04 mg, Intravenous, Q1MIN PRN  oxyCODONE, 15 mg, Oral, Q4H PRN x 1 dose 11/15, x 2 doses 11/16, x 1 dose 11/17   And  oxyCODONE, 20 mg, Oral, Q4H PRN x 3 doses 11/15, x 3 doses 11/16 x 4 doses 11/17   pancrelipase (Lip-Prot-Amyl), 24,000 Units, Oral, TID PRN  polyethylene glycol, 17 g, Oral, Daily PRN      Vital Signs (last 3 days) before discharge       Date/Time Temp Pulse Resp BP MAP (mmHg) SpO2 O2 Device Patient Position - Orthostatic VS Pain    11/18/24 15:02:03 -- -- 18 142/97 112 -- -- Lying --    11/18/24 1259 -- -- -- -- -- -- -- -- 7    11/18/24 1038 -- -- -- -- -- -- -- -- 8 11/18/24 0824 -- -- -- -- -- -- -- -- 8 11/18/24 07:36:38 98.5 °F (36.9 °C) 73 -- 124/78 93 92 % -- -- --    11/18/24 0518 -- -- -- -- -- -- -- -- 7 11/18/24 0316 -- -- -- -- -- -- -- -- 8    11/17/24 23:33:50 98.6 °F (37 °C) 84 18 125/80 95 92 % -- -- --    11/17/24 2231 -- -- -- -- --  -- -- -- 7 11/17/24 2111 -- -- -- -- -- -- -- -- 8 11/17/24 1813 -- -- -- -- -- -- -- -- 8 11/17/24 15:09:24 98.5 °F (36.9 °C) 73 20 130/82 98 93 % None (Room air) Lying --    11/17/24 1254 -- -- -- -- -- -- -- -- 7 11/17/24 1130 -- -- -- -- -- -- -- -- 7 11/17/24 0806 -- -- -- -- -- -- -- -- 8 11/17/24 07:35:37 97.9 °F (36.6 °C) 75 17 145/79 101 90 % -- -- --    11/17/24 0336 -- -- -- -- -- -- -- -- 8 11/17/24 0131 -- -- -- -- -- -- -- -- 8 11/16/24 23:55:51 98.3 °F (36.8 °C) 75 -- 137/89 105 94 % -- -- --    11/16/24 2229 -- -- -- -- -- -- -- -- 7 11/16/24 2200 -- -- -- -- -- -- None (Room air) -- --    11/16/24 2025 -- -- -- -- -- -- -- -- 8 11/16/24 1759 -- -- -- -- -- -- -- -- 8 11/16/24 15:21:47 98.3 °F (36.8 °C) 90 21 128/70 89 92 % -- -- --    11/16/24 1410 -- -- -- -- -- 93 % None (Room air) -- --    11/16/24 1311 -- -- -- -- -- -- -- -- 10 - Worst Possible Pain    11/16/24 1208 -- -- -- -- -- -- None (Room air) -- --    11/16/24 1047 -- -- -- -- -- -- -- -- 7 11/16/24 0856 -- -- -- -- -- -- -- -- 8 11/16/24 07:29:37 98.3 °F (36.8 °C) 72 16 109/74 86 92 % -- -- --    11/16/24 0415 -- -- -- -- -- -- -- -- 7 11/16/24 0300 -- -- -- -- -- -- -- -- 6 11/16/24 0001 -- -- -- -- -- -- -- -- 8    11/15/24 22:16:11 97.9 °F (36.6 °C) 76 -- 129/88 102 94 % -- -- --    11/15/24 2046 -- -- -- -- -- -- -- -- 8    11/15/24 2000 -- -- -- -- -- -- None (Room air) -- --    11/15/24 1926 -- -- -- -- -- -- -- -- 7    11/15/24 1628 -- -- -- -- -- -- -- -- 7    11/15/24 15:06:51 98.9 °F (37.2 °C) 85 -- 136/86 103 94 % -- -- --    11/15/24 1456 -- -- -- -- -- -- -- -- 7    11/15/24 1415 -- -- -- -- -- -- -- -- 6    11/15/24 1131 -- -- -- -- -- -- -- -- 7    11/15/24 0900 -- -- -- -- -- -- -- -- 8    11/15/24 07:43:03 98.4 °F (36.9 °C) 77 18 158/89 112 95 % None (Room air) -- --    11/15/24 0511 -- -- -- -- -- -- -- -- 8    11/15/24 0238 -- -- -- -- -- -- -- -- 7 Pertinent  Labs/Diagnostic Results:       Radiology:      CT chest wo contrast   Final Interpretation by Kathy Cadet MD (11/16 1420)      Left lung transplant with no consolidation.      Mild chronic tree-in-bud nodularity in the left lung, infectious/inflammatory.         Workstation performed: [id number]         CT soft tissue neck with contrast   Final Interpretation by Thang Gregg MD (11/10 1639)      No neck mass or cervical adenopathy.      Pansinus mucosal disease.      Partially imaged esophageal wall thickening, refer to separately dictated CT chest for further details.      Workstation performed: [id number]         CT chest with contrast   Final Interpretation by Minor Odell MD (11/10 0677)      Small left pulmonary nodules most of which appear similar to prior exam, however the largest a 6 mm nodule in the left upper lobe, not definitively visualized previously however may be due to differences in technique. Based on current Fleischner Society    2017 Guidelines on incidental pulmonary nodule, follow-up non-contrast CT is recommended at 6-12 months from the initial examination and, if stable at that time, an additional follow-up is recommended for 18-24 months from the initial examination.      Scattered small clusters of tree-in-bud nodules are compatible with infectious/inflammatory airways disease.      Esophageal wall thickening, similar to slightly decreased from prior exam. Findings may be secondary to persistent esophagitis.               Workstation performed: [id number]         XR chest 1 view portable   ED Interpretation by Jo Rondon DO (11/10 1429)   Unchanged when compared to previous chest x-ray.  Right hemithorax is lulu out, and the patient's heart is rotated to the right.      Final Interpretation by Nishant Saucedo MD (11/11 6532)      No acute cardiopulmonary process.            Resident: Dhaval Nice I, the attending radiologist, have reviewed the images and  agree with the final report above.      Workstation performed: [id number]                 Results from last 7 days   Lab Units 11/18/24  0519 11/17/24  0537 11/16/24  1354 11/15/24  0555   WBC Thousand/uL 5.23 4.97 9.18 4.60   HEMOGLOBIN g/dL 8.9* 9.0* 9.5* 9.2*   HEMATOCRIT % 31.2* 32.4* 33.7* 32.0*   PLATELETS Thousands/uL 143* 126* 133* 89*   TOTAL NEUT ABS Thousands/µL  --  2.38  --   --          Results from last 7 days   Lab Units 11/18/24  0519 11/17/24  0537 11/16/24  1354 11/15/24  0555   SODIUM mmol/L 139 139 136 138   POTASSIUM mmol/L 3.8 4.1 5.0 4.7   CHLORIDE mmol/L 111* 109* 107 105   CO2 mmol/L 24 25 25 28   ANION GAP mmol/L 4 5 4 5   BUN mg/dL 26* 25 36* 31*   CREATININE mg/dL 0.67 0.71 0.92 0.86   EGFR ml/min/1.73sq m 105 102 74 81   CALCIUM mg/dL 6.5* 7.3* 8.7 8.6   MAGNESIUM mg/dL  --   --   --  1.6*     Results from last 7 days   Lab Units 11/17/24  0537   AST U/L 23   ALT U/L 7   ALK PHOS U/L 56   TOTAL PROTEIN g/dL 5.4*   ALBUMIN g/dL 2.9*   TOTAL BILIRUBIN mg/dL 0.18*     Results from last 7 days   Lab Units 11/18/24  1045 11/18/24  0642 11/17/24  2131 11/17/24  1618 11/17/24  1232 11/17/24  1038 11/17/24  0609 11/16/24  2032 11/16/24  1813 11/16/24  1554 11/16/24  1103 11/16/24  0617   POC GLUCOSE mg/dl 139 131 221* 192* 117 87 192* 275* 376* 296* 57* 209*     Results from last 7 days   Lab Units 11/18/24  0519 11/17/24  0537 11/16/24  1354 11/15/24  0555   GLUCOSE RANDOM mg/dL 158* 141* 131 131             Beta- Hydroxybutyrate   Date Value Ref Range Status   10/21/2024 0.27 0.02 - 0.27 mmol/L Final   05/21/2024 0.06 0.02 - 0.27 mmol/L Final             Latest Reference Range & Units 11/12/24 05:20 11/13/24 03:55 11/16/24 13:54 11/17/24 05:37   Procalcitonin <=0.25 ng/ml 9.85 (H) 4.33 (H) 0.42 (H) 0.35 (H)   (H): Data is abnormally high            Network Utilization Review Department  ATTENTION: Please call with any questions or concerns to [phone] and carefully listen to the prompts  so that you are directed to the right person. All voicemails are confidential.   For Discharge needs, contact Care Management DC Support Team at [phone] opt. 2  Send all requests for admission clinical reviews, approved or denied determinations and any other requests to dedicated fax number below belonging to the campus where the patient is receiving treatment. List of dedicated fax numbers for the Facilities:  FACILITY NAME UR FAX NUMBER   ADMISSION DENIALS (Administrative/Medical Necessity) [phone]   DISCHARGE SUPPORT TEAM (NETWORK) [phone]   PARENT CHILD HEALTH (Maternity/NICU/Pediatrics) [phone]   [facility] [phone]   [facility] [phone]   [facility] [phone]   [facility] [phone]   [facility] [phone]   [facility] [phone]   [facility] [phone]   [facility] [phone]   [facility] [phone]   [facility] [phone]   [facility] [phone]   [facility] [phone]

## 2024-11-20 NOTE — TELEPHONE ENCOUNTER
I called patient yesterday and left message concerning the TCM appointment, she has not called back yet as of today     ----- Message from Janet DONIS sent at 11/19/2024  8:07 AM EST -----  Patient is being discharged from their inpatient hospitalization today. Patients unplanned readmission score is red or yellow (meaning that they are at high risk of readmission) and require a TCM appointment within 3-5 days of discharge. Please contact this patient to schedule appointment.    Thank you    Inpatient Care Management

## 2024-12-03 ENCOUNTER — TELEPHONE (OUTPATIENT)
Dept: INTERNAL MEDICINE CLINIC | Facility: CLINIC | Age: 49
End: 2024-12-03

## 2024-12-03 NOTE — TELEPHONE ENCOUNTER
Edwin Ruffin - is it possible for bet to have a VV today?  She has, what looks to be pink eye, and we don't want her around Urgent Care...  Let me know.     Or if someone is willing to call in an antibiotic eye drop

## 2024-12-03 NOTE — TELEPHONE ENCOUNTER
We can't do virtual     Hgb stable since discharge from [facility] on 5/28. No evidence of bleeding although daughter states she has had some rectal bleeding recently on/off and was supposed to get a colonoscopy but deferred due to cardiac issues. Also with weight loss.     - S/P 1 unit pRBC 6/6 of Hb 6.9   - Iron studies, B12, folate, retic, LDH, Hapto   - Active Type and screen  - Transfuse for Hgb <8 given her CAD and elevated troponin on presentation

## 2024-12-04 ENCOUNTER — TELEMEDICINE (OUTPATIENT)
Dept: INTERNAL MEDICINE CLINIC | Facility: CLINIC | Age: 49
End: 2024-12-04
Payer: COMMERCIAL

## 2024-12-04 DIAGNOSIS — H10.9 BACTERIAL CONJUNCTIVITIS: Primary | ICD-10-CM

## 2024-12-04 PROCEDURE — 99422 OL DIG E/M SVC 11-20 MIN: CPT

## 2024-12-04 RX ORDER — AZELASTINE HYDROCHLORIDE 0.5 MG/ML
1 SOLUTION/ DROPS OPHTHALMIC 2 TIMES DAILY
Qty: 6 ML | Refills: 0 | Status: SHIPPED | OUTPATIENT
Start: 2024-12-04

## 2024-12-04 RX ORDER — KETOTIFEN FUMARATE 0.35 MG/ML
1 SOLUTION/ DROPS OPHTHALMIC 2 TIMES DAILY
Qty: 3 ML | Refills: 0 | Status: CANCELLED | OUTPATIENT
Start: 2024-12-04

## 2024-12-04 RX ORDER — POLYMYXIN B SULFATE AND TRIMETHOPRIM 1; 10000 MG/ML; [USP'U]/ML
1 SOLUTION OPHTHALMIC EVERY 4 HOURS
Qty: 10 ML | Refills: 0 | Status: SHIPPED | OUTPATIENT
Start: 2024-12-04

## 2024-12-04 RX ORDER — POLYMYXIN B SULFATE AND TRIMETHOPRIM 1; 10000 MG/ML; [USP'U]/ML
1 SOLUTION OPHTHALMIC EVERY 4 HOURS
Qty: 10 ML | Refills: 0 | Status: CANCELLED | OUTPATIENT
Start: 2024-12-04

## 2024-12-04 NOTE — PROGRESS NOTES
Virtual Regular Visit  Name: Maximilian Mann      : 1975      MRN: [id number]  Encounter Provider: Wally Patel MD  Encounter Date: 2024   Encounter department: [facility] INTERNAL MEDICINE [place]      Verification of patient location:  Patient is located at Home in the following state in which I hold an active license [place] :  Assessment & Plan  Bacterial conjunctivitis  Patient presents with 2 days of progressively worsening redness, green discharge, blurry vision and tenderness in her eyes - spread from right eye to left eye.  No associated sinus pain, sore throat, ear pain, headache, fever or chills  Physical exam limited by quality of video but both eyes have greenish discharge from lacrimal ducts, conjunctivae are injected bilaterally, eyelids are erythematous and puffy bilaterally   Suspect patient initially had infection in her right eye and it was transferred to left eye via rubbing with her hands    Will trial a course of polytrim abx drops for 5 days along with azelastine antihistamine drops   She may alternate tylenol and ibuprofen for pain  Will schedule another follow up appointment in 2 days to check patient's progress  She understands that if symptoms worsen or vision changes she is to go to the emergency department             Encounter provider Wally Patel MD    The patient was identified by name and date of birth. Maximilian Mann was informed that this is a telemedicine visit and that the visit is being conducted through the Epic Embedded platform. She agrees to proceed..  My office door was closed. No one else was in the room.  She acknowledged consent and understanding of privacy and security of the video platform. The patient has agreed to participate and understands they can discontinue the visit at any time.    Patient is aware this is a billable service.     History of Present Illness     Patient presents with 2 days of progressively worsening eye redness,  grittiness and greenish discharge.  Symptoms began in her right eye.  She says she was rubbing her right eye with her hand and there is a chance she touched her left eye afterwards.  Symptoms spread to her left eye yesterday.  She has had pink eye in the past that has improved with abx eyedrops.  Patient says that she has mild blurry vision but no pain behind the eyes, difficulty moving her eyes. Pain is predominantly when she rubs her eyes.  No associated headache, weakness, sensory loss, changes in hearing/ear pain, no sinus pain, no sore throat, or cough.      Review of Systems   Constitutional:  Negative for chills and fever.   HENT:  Negative for ear discharge, ear pain, hearing loss, rhinorrhea, sinus pressure, sinus pain, sore throat and trouble swallowing.    Eyes:  Negative for pain and visual disturbance.   Respiratory:  Negative for cough and shortness of breath.    Cardiovascular:  Negative for chest pain and palpitations.   Gastrointestinal:  Negative for abdominal pain and vomiting.   Genitourinary:  Negative for dysuria and hematuria.   Musculoskeletal:  Negative for arthralgias and back pain.   Skin:  Negative for color change and rash.   Neurological:  Negative for seizures and syncope.   All other systems reviewed and are negative.      Objective   LMP 07/15/2024 (Approximate)     Physical Exam  Vitals and nursing note reviewed.   Constitutional:       General: She is not in acute distress.     Appearance: She is well-developed.   HENT:      Head: Normocephalic and atraumatic.   Eyes:      General:         Right eye: Discharge present.         Left eye: Discharge present.     Extraocular Movements: Extraocular movements intact.      Conjunctiva/sclera:      Right eye: Right conjunctiva is injected. Chemosis present.      Left eye: Left conjunctiva is injected. Chemosis present.   Cardiovascular:      Rate and Rhythm: Normal rate and regular rhythm.      Heart sounds: No murmur heard.  Pulmonary:       [place]: Pulmonary effort is normal. No respiratory distress.      Breath sounds: Normal breath sounds.   Abdominal:      Palpations: Abdomen is soft.      Tenderness: There is no abdominal tenderness.   Musculoskeletal:         General: No swelling.      Cervical back: Neck supple.   Skin:     General: Skin is warm and dry.      Capillary Refill: Capillary refill takes less than 2 seconds.   Neurological:      Mental Status: She is alert.   Psychiatric:         Mood and Affect: Mood normal.       Visit Time  Total Visit Duration: 15 minutes

## 2024-12-04 NOTE — ASSESSMENT & PLAN NOTE
Patient presents with 2 days of progressively worsening redness, green discharge, blurry vision and tenderness in her eyes - spread from right eye to left eye.  No associated sinus pain, sore throat, ear pain, headache, fever or chills  Physical exam limited by quality of video but both eyes have greenish discharge from lacrimal ducts, conjunctivae are injected bilaterally, eyelids are erythematous and puffy bilaterally   Suspect patient initially had infection in her right eye and it was transferred to left eye via rubbing with her hands    Will trial a course of polytrim abx drops for 5 days along with azelastine antihistamine drops   She may alternate tylenol and ibuprofen for pain  Will schedule another follow up appointment in 2 days to check patient's progress  She understands that if symptoms worsen or vision changes she is to go to the emergency department

## 2024-12-09 DIAGNOSIS — E84.9 CYSTIC FIBROSIS (HCC): ICD-10-CM

## 2024-12-09 DIAGNOSIS — F11.20 CONTINUOUS OPIOID DEPENDENCE (HCC): ICD-10-CM

## 2024-12-09 NOTE — TELEPHONE ENCOUNTER
Medication: ALPRAZolam (XANAX) 1 mg tablet     Dose/Frequency:     Take 1 tablet (1 mg total) by mouth daily at bedtime as needed for anxiety or sleep       Quantity: 3 tabs    Pharmacy: [facility]/pharmacy #[id number] - [place]     Office:   [x] PCP/Provider -   [] Speciality/Provider -     Does the patient have enough for 3 days?   [] Yes   [] No - Send as HP to POD    Medication: oxyCODONE (ROXICODONE) 10 MG TABS ()     Dose/Frequency: Take 1 tablet (10 mg total) by mouth every 4 (four) hours as needed for moderate pain or severe pain Max Daily Amount: 60 mg     Quantity: 180 tabs    Pharmacy: [facility]/pharmacy #[id number] - [place]     Office:   [x] PCP/Provider -   [] Speciality/Provider -     Does the patient have enough for 3 days?   [] Yes   [] No - Send as HP to POD

## 2024-12-10 RX ORDER — ALPRAZOLAM 1 MG/1
1 TABLET ORAL
Qty: 30 TABLET | Refills: 0 | Status: SHIPPED | OUTPATIENT
Start: 2024-12-10 | End: 2024-12-12 | Stop reason: SDUPTHER

## 2024-12-10 RX ORDER — OXYCODONE HYDROCHLORIDE 10 MG/1
10 TABLET ORAL EVERY 4 HOURS PRN
Qty: 180 TABLET | Refills: 0 | Status: SHIPPED | OUTPATIENT
Start: 2024-12-10 | End: 2024-12-12 | Stop reason: SDUPTHER

## 2024-12-12 ENCOUNTER — NURSE TRIAGE (OUTPATIENT)
Age: 49
End: 2024-12-12

## 2024-12-12 DIAGNOSIS — F11.20 CONTINUOUS OPIOID DEPENDENCE (HCC): ICD-10-CM

## 2024-12-12 DIAGNOSIS — E84.9 CYSTIC FIBROSIS (HCC): ICD-10-CM

## 2024-12-12 RX ORDER — OXYCODONE HYDROCHLORIDE 10 MG/1
10 TABLET ORAL EVERY 4 HOURS PRN
Qty: 180 TABLET | Refills: 0 | Status: SHIPPED | OUTPATIENT
Start: 2024-12-12 | End: 2025-01-11

## 2024-12-12 RX ORDER — ALPRAZOLAM 1 MG/1
1 TABLET ORAL
Qty: 30 TABLET | Refills: 0 | Status: SHIPPED | OUTPATIENT
Start: 2024-12-12

## 2024-12-12 NOTE — TELEPHONE ENCOUNTER
Rx sent to [facility], the one for mail order was already filled not able to cancel it, recommend that patient keep it for the next month if she receives it

## 2024-12-12 NOTE — TELEPHONE ENCOUNTER
"Refill request noted Pt's preferred pharmacy is [facility] in [place], but medication was sent to [facility] mail order in error. Please review and resend to [facility]. Thank you      Reason for Disposition   Prescription prescribed recently is not at pharmacy and triager has access to patient's EMR and prescription is recorded in the EMR    Answer Assessment - Initial Assessment Questions  1. NAME of MEDICINE: \"What medicine(s) are you calling about?\"      Oxy and Xanax  2. QUESTION: \"What is your question?\" (e.g., double dose of medicine, side effect)      Medications were sent to the wrong pharmacy.  3. PRESCRIBER: \"Who prescribed the medicine?\" Reason: if prescribed by specialist, call should be referred to that group.      PCP    Protocols used: Medication Question Call-Adult-[place]    "

## 2024-12-16 ENCOUNTER — APPOINTMENT (OUTPATIENT)
Dept: LAB | Facility: HOSPITAL | Age: 49
End: 2024-12-16
Attending: UROLOGY
Payer: COMMERCIAL

## 2024-12-16 ENCOUNTER — CONSULT (OUTPATIENT)
Dept: INTERNAL MEDICINE CLINIC | Facility: CLINIC | Age: 49
End: 2024-12-16
Payer: MEDICARE

## 2024-12-16 VITALS
HEIGHT: 62 IN | OXYGEN SATURATION: 96 % | DIASTOLIC BLOOD PRESSURE: 74 MMHG | SYSTOLIC BLOOD PRESSURE: 120 MMHG | HEART RATE: 83 BPM | TEMPERATURE: 97.2 F | BODY MASS INDEX: 21.53 KG/M2 | WEIGHT: 117 LBS | RESPIRATION RATE: 14 BRPM

## 2024-12-16 DIAGNOSIS — N20.0 RENAL CALCULUS, LEFT: ICD-10-CM

## 2024-12-16 DIAGNOSIS — D69.6 THROMBOCYTOPENIA (HCC): ICD-10-CM

## 2024-12-16 DIAGNOSIS — E84.9 CYSTIC FIBROSIS (HCC): ICD-10-CM

## 2024-12-16 DIAGNOSIS — R05.3 CHRONIC COUGH: Primary | ICD-10-CM

## 2024-12-16 DIAGNOSIS — I10 ESSENTIAL HYPERTENSION: ICD-10-CM

## 2024-12-16 DIAGNOSIS — Z94.2 HX OF LUNG TRANSPLANT (HCC): ICD-10-CM

## 2024-12-16 DIAGNOSIS — Z01.818 PRE-OPERATIVE CLEARANCE: ICD-10-CM

## 2024-12-16 DIAGNOSIS — Z01.812 PRE-OPERATIVE LABORATORY EXAMINATION: ICD-10-CM

## 2024-12-16 DIAGNOSIS — E10.22 TYPE 1 DIABETES MELLITUS WITH DIABETIC CHRONIC KIDNEY DISEASE, UNSPECIFIED CKD STAGE (HCC): ICD-10-CM

## 2024-12-16 DIAGNOSIS — R39.89 SUSPECTED UTI: ICD-10-CM

## 2024-12-16 LAB
ANION GAP SERPL CALCULATED.3IONS-SCNC: 9 MMOL/L (ref 4–13)
BASOPHILS # BLD AUTO: 0.02 THOUSANDS/ÂΜL (ref 0–0.1)
BASOPHILS NFR BLD AUTO: 0 % (ref 0–1)
BUN SERPL-MCNC: 30 MG/DL (ref 5–25)
CALCIUM SERPL-MCNC: 9.3 MG/DL (ref 8.4–10.2)
CHLORIDE SERPL-SCNC: 103 MMOL/L (ref 96–108)
CO2 SERPL-SCNC: 26 MMOL/L (ref 21–32)
CREAT SERPL-MCNC: 1.14 MG/DL (ref 0.6–1.3)
EOSINOPHIL # BLD AUTO: 0.02 THOUSAND/ÂΜL (ref 0–0.61)
EOSINOPHIL NFR BLD AUTO: 0 % (ref 0–6)
ERYTHROCYTE [DISTWIDTH] IN BLOOD BY AUTOMATED COUNT: 16.2 % (ref 11.6–15.1)
EST. AVERAGE GLUCOSE BLD GHB EST-MCNC: 177 MG/DL
GFR SERPL CREATININE-BSD FRML MDRD: 57 ML/MIN/1.73SQ M
GLUCOSE P FAST SERPL-MCNC: 187 MG/DL (ref 65–99)
HBA1C MFR BLD: 7.8 %
HCT VFR BLD AUTO: 38.3 % (ref 34.8–46.1)
HGB BLD-MCNC: 10.9 G/DL (ref 11.5–15.4)
IMM GRANULOCYTES # BLD AUTO: 0.06 THOUSAND/UL (ref 0–0.2)
IMM GRANULOCYTES NFR BLD AUTO: 1 % (ref 0–2)
LYMPHOCYTES # BLD AUTO: 0.85 THOUSANDS/ÂΜL (ref 0.6–4.47)
LYMPHOCYTES NFR BLD AUTO: 8 % (ref 14–44)
MCH RBC QN AUTO: 24.5 PG (ref 26.8–34.3)
MCHC RBC AUTO-ENTMCNC: 28.5 G/DL (ref 31.4–37.4)
MCV RBC AUTO: 86 FL (ref 82–98)
MONOCYTES # BLD AUTO: 0.46 THOUSAND/ÂΜL (ref 0.17–1.22)
MONOCYTES NFR BLD AUTO: 4 % (ref 4–12)
NEUTROPHILS # BLD AUTO: 9.91 THOUSANDS/ÂΜL (ref 1.85–7.62)
NEUTS SEG NFR BLD AUTO: 87 % (ref 43–75)
NRBC BLD AUTO-RTO: 0 /100 WBCS
PLATELET # BLD AUTO: 183 THOUSANDS/UL (ref 149–390)
PMV BLD AUTO: 11.4 FL (ref 8.9–12.7)
POTASSIUM SERPL-SCNC: 4.5 MMOL/L (ref 3.5–5.3)
RBC # BLD AUTO: 4.45 MILLION/UL (ref 3.81–5.12)
SODIUM SERPL-SCNC: 138 MMOL/L (ref 135–147)
WBC # BLD AUTO: 11.32 THOUSAND/UL (ref 4.31–10.16)

## 2024-12-16 PROCEDURE — 87086 URINE CULTURE/COLONY COUNT: CPT

## 2024-12-16 PROCEDURE — G2211 COMPLEX E/M VISIT ADD ON: HCPCS | Performed by: INTERNAL MEDICINE

## 2024-12-16 PROCEDURE — 87147 CULTURE TYPE IMMUNOLOGIC: CPT

## 2024-12-16 PROCEDURE — 83036 HEMOGLOBIN GLYCOSYLATED A1C: CPT

## 2024-12-16 PROCEDURE — 85025 COMPLETE CBC W/AUTO DIFF WBC: CPT

## 2024-12-16 PROCEDURE — 36415 COLL VENOUS BLD VENIPUNCTURE: CPT

## 2024-12-16 PROCEDURE — 99213 OFFICE O/P EST LOW 20 MIN: CPT | Performed by: INTERNAL MEDICINE

## 2024-12-16 PROCEDURE — 80048 BASIC METABOLIC PNL TOTAL CA: CPT

## 2024-12-16 RX ORDER — GUAIFENESIN 200 MG/10ML
400 LIQUID ORAL 3 TIMES DAILY PRN
Qty: 240 ML | Refills: 0 | Status: SHIPPED | OUTPATIENT
Start: 2024-12-16 | End: 2024-12-20

## 2024-12-16 NOTE — ASSESSMENT & PLAN NOTE
Orders:    guaiFENesin (ROBITUSSIN) 100 MG/5ML oral liquid; Take 20 mL (400 mg total) by mouth 3 (three) times a day as needed for cough

## 2024-12-16 NOTE — PROGRESS NOTES
Pre-operative Clearance  Name: Maximilian Mann      : 1975      MRN: [id number]  Encounter Provider: Areli Harrington MD  Encounter Date: 2024   Encounter department: [facility] INTERNAL MEDICINE [facility]    Assessment & Plan  Chronic cough    Orders:  •  guaiFENesin (ROBITUSSIN) 100 MG/5ML oral liquid; Take 20 mL (400 mg total) by mouth 3 (three) times a day as needed for cough    Thrombocytopenia (HCC)         Type 1 diabetes mellitus with diabetic chronic kidney disease, unspecified CKD stage (HCC)    Lab Results   Component Value Date    HGBA1C 7.8 (H) 2024            Essential hypertension         Cystic fibrosis (HCC)         Hx of lung transplant (HCC)         Pre-operative Clearance:     Clearance:       Patient's chronic conditions optimized for intervention. Patient not yet cleared. Pending lab work results.     Medication Instructions:   - Avoid herbs or non-directed vitamins one week prior to surgery    - Avoid aspirin containing medications or non-steroidal anti-inflammatory drugs one week preceding surgery    - 5HT3 Antagonist (ie, zofran):  Continue to take this medication on your normal schedule.   - Acyclovir: Continue to take this medication on your normal schedule.  - Alpha-1 Adrenergic Blocker (ie, tamsulosin, doxazosin, alfusozin): Continue to take this medication on your normal schedule.  - Antiepileptic meds: Continue to take this medication on your normal schedule.  - Benzodiazepines (ie, alprazolam, lorazepam, diazepam):  If the medication is needed, continue to take it on your normal schedule.  - Corticosteroids: Continue to take this medication on your normal schedule.  - Direct Xa Inhibitor (ie, Eliquis, Xarelto): Stop taking medication 3 days prior to procedure/surgery.  - Diuretics: Continue taking this medication up to the evening before surgery/procedure, but do not take in the morning of the day of surgery/procedure.  - H2 Blocker: Continue to take this  medication on your normal schedule.  - Opioids: Continue to take this medication on your normal schedule.  - Urinary Antispasmodics: Continue taking medication up to the evening before procedure/surgery, but do not take this medication on the morning of procedure/surgery.      Medicine Instructions for Adults with Diabetes (NO Bowel Prep)    Follow these instructions when a BOWEL PREP is NOT required for your procedure or surgery!    NOTE:  GLP Agonists taken weekly: do not take in the 7 days before your procedure. **Bariatric surgery: do not take 4 weeks prior to your procedure.    SGLT-2 Inhibitors: do not take in the 4 days before your procedure    On the Day Before Surgery/Procedure  If you are having a procedure (e.g., Colonoscopy) or surgery which DOES NOT require a bowel prep, follow the directions below based on the type of medicine you take for your diabetes.  Type of Medicine You Take Examples What to Do   Pre-Mixed Insulin Intermediate  Eahpmeg43/25, Eqezcak27/30, Novolog 70/30, Regular Insulin Take 1/2 your regular dose the evening before our procedure.   Rapid/Fast Acting  Insulin and/or Long-Acting Insulin Humalog U200, NovoLog, Apidra,  Lantus, Levemir, Tresiba, Toujeo,  Fias, Basaglar Take your FULL regular dose the day before procedure.   Oral Diabetic Medicines (sulfonylurea) Glipizide/Glimepiride/  Glucotrol Take your regular dose with dinner the evening before your procedure.   Other Oral Diabetic Medicines Metformin, Glucophage, Glucophage  XR, Riomet, Glumetza, Actose,  Avandia, Gl set, Prandin Take your regular dose with dinner the evening before your procedure   GLP Agonists Adlyxin, Byetta, Bydureon,  Ozempic, Soliqua, Tanzeum,  Trulicity, Victoza, Saxenda,  Rybelsus, Wegovy, Mounjaro, Zepbound If taken daily, take as normal  If taken weekly, do not take this medicine for 7 days before your procedure including the day of the procedure (resume taking after the procedure). **Bariatric surgery:  do not take 4 weeks prior to procedure   SGLT-2 Inhibitors Jardiance, Invokana, Farxiga, Steglatro, Brenzavvy, Qtern, Segluromet Glyxambi, Synjardy, Synjardy XR, Invokamet, InvokametXR, Trijary XR, Xigduo X Do not take for 4 days before your procedure including the day of the procedure (resume taking after the procedure)   This educational material has been approved by the Patient Education Advisory Committee.    On the Day of Surgery/Procedure  Follow the directions below based on the type of medicine you take for your diabetes.  Type of Medicine You Take  Examples What to Do   Long-Acting Insulin Lantus, Levemir, Tresiba,  Toujeo, Basaglar, Semglee If you normally take your Long Acting Insulin in the morning, take the full dose as scheduled.   GLP-I Agonists Adlyxin, Byetta, Bydureon,  Ozempic, Soliqua, Tanzeum,  Trulicity, Victoza, Saxenda,  Rybelsus, Mounjaro Do NOT take this medicine on the day of your procedure (resume taking after the procedure)   Except for the morning Long-Acting Insulin, DO NOT take ANY diabetic medicine on the day of your procedure unless you were instructed by the doctor who manages your diabetes medicines.  Continue to check your blood sugars.  If you have an insulin pump, ask your endocrinologist for instructions at least 3 days before your procedure. NOTE: If you are not able to ask your endocrinologist in advance, on the day of the procedure set your insulin pump to your basal rate only. Bring your insulin pump supplies to the hospital.       History of Present Illness     Patient presents to the office for preop evaluation prior to cystoscopy , ureteroscopy with lithotripsy and ureteral stent insertion on 12/26/24. Reports feeling well overall. Reports that chronic symptoms are at baseline. No new complaints.      Review of Systems   Constitutional:  Negative for chills, diaphoresis, fatigue and fever.   HENT:  Negative for congestion and sore throat.    Respiratory:  Negative for  cough, choking, shortness of breath and wheezing.    Cardiovascular:  Negative for chest pain, palpitations and leg swelling.   Gastrointestinal:  Negative for abdominal distention, abdominal pain, constipation, nausea and vomiting.   Genitourinary:  Negative for dysuria.   Neurological:  Negative for dizziness, light-headedness and headaches.     Past Medical History   Past Medical History:   Diagnosis Date   • Abnormal uterine bleeding (AUB) 7/7/2022   • ABPA (allergic bronchopulmonary aspergillosis) ([facility]) 07/08/2012   • Acute kidney injury ([facility]) 09/08/2017   • Acute on chronic respiratory failure ([facility]) 03/04/2017    Last Assessment & Plan:  Formatting of this note might be different from the original. Recurrent with clinical worsening with enlarging PTX, s/p multiple chest tubes, but new fevers 6/19 and worsening leukocytosis indicating infection as cause of 6/19 decompensation with profound hypoxia in setting of shunting through right lung and continued pneumothorax s/p 2nd chest tube placement.  6/27: Right   • Anaphylaxis 06/06/2022   • Anemia    • Asthma    • Chest pain 09/21/2021   • Chronic pain    • Chronic respiratory failure with hypoxia ([facility]) 09/07/2021   • COVID-19 06/03/2022   • Cystic fibrosis ([facility])    • Deep vein thrombosis ([facility])    • Diabetes mellitus ([facility])    • Fever 09/22/2024   • GERD (gastroesophageal reflux disease)    • HBP (high blood pressure)    • HCAP (healthcare-associated pneumonia) 03/06/2020   • History of transfusion    • Influenza B 02/23/2020   • Kidney stone    • Moderate protein-calorie malnutrition ([facility]) 01/08/2022   • Neutropenic fever  ([facility]) 09/26/2020   • Pneumonia due to Pseudomonas species ([facility]) 10/28/2010    Last Assessment & Plan:  Formatting of this note might be different from the original. Polymicrobial pna w/ pseudomonas, M. Abscessus, Cadida glabrata/albicans   • PONV (postoperative nausea and vomiting)    • Pseudomonas aeruginosa infection 09/20/2012   • Renal  calculus    • Squamous cell skin cancer 01/12/2023    SCCIS- Left upper thigh   • Transplant recipient     Lung- 2013, 2017   • Ulcerative colitis (HCC)    • Wellness examination 02/04/2013    Formatting of this note might be different from the original. Discussed with patient and acknowledgement form signed on 2/4/2013 (ALYSHA)     Past Surgical History:   Procedure Laterality Date   • APPENDECTOMY     • AUGMENTATION MAMMAPLASTY Bilateral 2004   • AUGMENTATION MAMMAPLASTY Right 2021    removed    • BREAST SURGERY      Aug.    • BRONCHOSCOPY     • CHOLECYSTECTOMY     • COLONOSCOPY     • EGD  07/2019    Yeast Infect.    • FL RETROGRADE PYELOGRAM  01/09/2020   • FL RETROGRADE PYELOGRAM  02/04/2020   • FL RETROGRADE PYELOGRAM  9/20/2024   • GASTROSCOPY  03/01/2024   • GASTROSTOMY TUBE, PLACE     • IR BALLOON-OCCLUDED ANTEGRADE TRANSVENOUS OBLITERATION (BATO)  11/22/2022   • IR PORT PLACEMENT  03/22/2022   • IR PORT REMOVAL  01/10/2022   • IR PORT STRIPPING  11/25/2022   • IR TUNNELED CENTRAL LINE PLACEMENT  01/13/2022   • LUNG SURGERY     • LUNG TRANSPLANT      X2   • FL CYSTO BLADDER W/URETERAL CATHETERIZATION Right 01/09/2020    Procedure: CYSTOSCOPY RETROGRADE PYELOGRAM WITH INSERTION STENT URETERAL;  Surgeon: Patricio Perry MD;  Location: [facility];  Service: Urology   • FL CYSTO/URETERO W/LITHOTRIPSY &INDWELL STENT INSRT Right 02/04/2020    Procedure: CYSTOSCOPY URETEROSCOPY WITH LITHOTRIPSY HOLMIUM LASER, RETROGRADE PYELOGRAM AND INSERTION STENT URETERAL;  Surgeon: Patricio Perry MD;  Location: [facility];  Service: Urology   • FL CYSTO/URETERO W/LITHOTRIPSY &INDWELL STENT INSRT Left 9/20/2024    Procedure: CYSTOSCOPY URETEROSCOPY WITH LITHOTRIPSY HOLMIUM LASER, RETROGRADE PYELOGRAM AND INSERTION STENT URETERAL, BASKET STONE EXTRACTION;  Surgeon: Lee Briceno MD;  Location: [facility];  Service: Urology   • SKIN BIOPSY     • US GUIDED VASCULAR ACCESS  01/17/2017     Family History   Problem Relation  Age of Onset   • Alcohol abuse Mother    • Mental illness Mother    • Depression Mother    • Alcohol abuse Father    • COPD Maternal Grandmother    • Lung cancer Maternal Grandmother 65   • Cancer Maternal Grandmother    • COPD Maternal Grandfather    • Cancer Maternal Grandfather    • COPD Paternal Grandmother    • Lung cancer Paternal Grandmother 65   • No Known Problems Sister    • No Known Problems Paternal Aunt    • Breast cancer Neg Hx    • Ovarian cancer Neg Hx    • Colon cancer Neg Hx      Social History     Tobacco Use   • Smoking status: Former     Current packs/day: 0.00     Average packs/day: 0.5 packs/day for 12.0 years (6.0 ttl pk-yrs)     Types: Cigarettes     Start date: 1993     Quit date: 2005     Years since quittin.9     Passive exposure: Past   • Smokeless tobacco: Never   Vaping Use   • Vaping status: Never Used   Substance and Sexual Activity   • Alcohol use: Yes     Alcohol/week: 2.0 standard drinks of alcohol     Types: 2 Glasses of wine per week     Comment: social   • Drug use: Yes     Frequency: 2.0 times per week     Types: Marijuana     Comment: medical edible marijuana prescribed   • Sexual activity: Yes     Partners: Male     Birth control/protection: None     Current Outpatient Medications on File Prior to Visit   Medication Sig   • acetaminophen (TYLENOL) 325 mg tablet Take 3 tablets (975 mg total) by mouth every 8 (eight) hours   • albuterol (2.5 mg/3 mL) 0.083 % nebulizer solution Take 6 mL (5 mg total) by nebulization 2 (two) times a day   • albuterol (PROVENTIL HFA,VENTOLIN HFA) 90 mcg/act inhaler INHALE 2 PUFFS EVERY 8 HOURS AS NEEDED FOR SHORTNESS OF BREATH OR WHEEZING.   • ALPRAZolam (XANAX) 1 mg tablet Take 1 tablet (1 mg total) by mouth daily at bedtime as needed for anxiety or sleep   • apixaban (ELIQUIS) 5 mg Take 1 tablet (5 mg total) by mouth 2 (two) times a day   • Ascorbic Acid (vitamin C) 100 MG tablet Take 100 mg by mouth daily   • atovaquone (MEPRON) 750  mg/5 mL suspension Take 1,500 mg by mouth daily   • azelastine (OPTIVAR) 0.05 % ophthalmic solution Administer 1 drop to both eyes 2 (two) times a day   • azithromycin (ZITHROMAX) 250 mg tablet Take 250 mg by mouth 3 (three) times a week   • BD Pen Needle Abi U/F 32G X 4 MM MISC 4 times daily   • bisacodyl (DULCOLAX) 5 mg EC tablet Take 1 tablet (5 mg total) by mouth daily at bedtime   • budesonide-formoterol (SYMBICORT) 160-4.5 mcg/act inhaler Inhale 2 puffs every 12 (twelve) hours   • Cholecalciferol (Vitamin D3) 125 MCG (5000 UT) TABS Take 5,000 Units by mouth daily   • Continuous Blood Gluc  (Dexcom G7 ) SUKHI Use 1 each continuous   • Continuous Glucose Sensor (Dexcom G7 Sensor) Use 1 Device every 10 days   • dexlansoprazole (DEXILANT) 60 MG capsule TAKE 1 CAPSULE BY MOUTH EVERY DAY IN THE MORNING   • dicyclomine (BENTYL) 10 mg capsule Take 2 capsules (20 mg total) by mouth 4 (four) times a day (before meals and at bedtime)   • dronabinol (MARINOL) 5 MG capsule Take 1 capsule (5 mg total) by mouth 3 (three) times a day   • famotidine (PEPCID) 40 MG tablet TAKE 1 TABLET BY MOUTH EVERYDAY AT BEDTIME   • furosemide (LASIX) 20 mg tablet Take 40 mg by mouth daily (Patient not taking: Reported on 12/17/2024)   • insulin aspart (NovoLOG FlexPen) 100 UNIT/ML injection pen INJECT 10 UNITS pre-BREAKFAST, 8 UNITS pre-LUNCH and 12 UNITS pre-DINNER plus scale; max 50 UNITS/day   • Insulin Disposable Pump (Omnipod 5 G6 Intro, Gen 5,) KIT Use 1 Units continuous   • Insulin Disposable Pump (Omnipod 5 G6 Pods, Gen 5,) MISC Use 1 Units every other day   • Insulin Glargine-yfgn (Semglee, yfgn,) 100 UNIT/ML SOPN Inject 0.12 mL (12 Units total) under the skin every morning   • Insulin Pen Needle (Comfort EZ Pen Needles) 33G X 4 MM MISC Use to inject insulin 4 times a day   • naloxone (NARCAN) 4 mg/0.1 mL nasal spray 0.1 mL (4 mg total) into each nostril every 3 (three) minutes as needed for opioid reversal or  respiratory depression   • ondansetron (ZOFRAN) 4 mg tablet Take 4 mg by mouth every 8 (eight) hours as needed for nausea   • oxybutynin (DITROPAN XL) 15 MG 24 hr tablet Take 1 tablet (15 mg total) by mouth daily at bedtime   • oxyCODONE (ROXICODONE) 10 MG TABS Take 1 tablet (10 mg total) by mouth every 4 (four) hours as needed for moderate pain or severe pain Max Daily Amount: 60 mg   • pancrelipase, Lip-Prot-Amyl, (CREON) 24,000 units Take 48,000 Units by mouth 3 (three) times a day with meals   • pantoprazole (PROTONIX) 40 mg tablet Take 1 tablet (40 mg total) by mouth 2 (two) times a day before meals   • polyethylene glycol (MIRALAX) 17 g packet Take 17 g by mouth 2 (two) times a day   • polymyxin b-trimethoprim (POLYTRIM) ophthalmic solution Administer 1 drop to both eyes every 4 (four) hours   • predniSONE 10 mg tablet Take 10 mg by mouth daily Taking 15 mg now   • pregabalin (LYRICA) 150 mg capsule Take 1 capsule (150 mg total) by mouth 2 times a day   • Respiratory Therapy Supplies (Nebulizer) SUKHI Please dispense nebulizer machine   • sucralfate (CARAFATE) 1 g tablet Take 1 tablet (1 g total) by mouth 3 (three) times a day before meals   • tacrolimus (PROGRAF) 1 mg capsule Take 2 capsules (2 mg total) by mouth 2 (two) times a day   • Turmeric 500 MG CAPS Take by mouth   • valACYclovir (VALTREX) 500 mg tablet Take 500 mg by mouth daily   • vitamin B-12 (VITAMIN B-12) 1,000 mcg tablet Take by mouth daily   • cyclobenzaprine (FLEXERIL) 5 mg tablet Take 1 tablet (5 mg total) by mouth 3 (three) times a day as needed for muscle spasms for up to 14 days   • fluconazole (DIFLUCAN) 150 mg tablet Take 150 mg by mouth once (Patient not taking: Reported on 10/24/2024)   • insulin glargine (LANTUS) 100 units/mL subcutaneous injection Inject 12 Units under the skin every morning (Patient not taking: Reported on 12/16/2024)   • insulin lispro (HumALOG/ADMELOG) 100 units/mL injection Inject 10 Units under the skin daily  "with breakfast (Patient not taking: Reported on 12/16/2024)   • insulin lispro (HumALOG/ADMELOG) 100 units/mL injection Inject 12 Units under the skin daily with dinner (Patient not taking: Reported on 12/16/2024)   • insulin lispro (HumALOG/ADMELOG) 100 units/mL injection Inject 8 Units under the skin daily with lunch (Patient not taking: Reported on 12/16/2024)   • multivitamin (THERAGRAN) TABS Take 1 tablet by mouth daily (Patient not taking: Reported on 11/10/2024)   • tamsulosin (FLOMAX) 0.4 mg Take 1 capsule (0.4 mg total) by mouth daily with dinner (Patient not taking: Reported on 11/10/2024)   • [DISCONTINUED] traZODone (DESYREL) 50 mg tablet Take 0.5 tablets (25 mg total) by mouth daily at bedtime     Allergies   Allergen Reactions   • Bebtelovimab Anaphylaxis   • Vancomycin Angioedema and Hives   • Gabapentin Hallucinations   • Ceftazidime Headache     Severe headaches after desensitization   • Nsaids Other (See Comments)     Lung transplant increases risk of renal toxicity, call lung txp provider to discuss if needed     Objective   /74 (BP Location: Left arm, Patient Position: Sitting, Cuff Size: Large)   Pulse 83   Temp (!) 97.2 °F (36.2 °C) (Tympanic)   Resp 14   Ht 5' 2\" (1.575 m)   Wt 53.1 kg (117 lb)   LMP 07/15/2024 (Approximate)   SpO2 96%   BMI 21.40 kg/m²     Physical Exam  Vitals and nursing note reviewed.   Constitutional:       General: She is not in acute distress.     Appearance: Normal appearance. She is not ill-appearing, toxic-appearing or diaphoretic.   HENT:      Head: Normocephalic and atraumatic.      Nose: Nose normal.      Mouth/Throat:      Mouth: Mucous membranes are moist.      Pharynx: Oropharynx is clear. No oropharyngeal exudate or posterior oropharyngeal erythema.   Eyes:      General: No scleral icterus.  Cardiovascular:      Rate and Rhythm: Normal rate and regular rhythm.   Pulmonary:      Effort: Pulmonary effort is normal. No respiratory distress.      " Breath sounds: Normal breath sounds. No wheezing, rhonchi or rales.   Abdominal:      General: Bowel sounds are normal.      Palpations: Abdomen is soft.      Tenderness: There is no abdominal tenderness. There is no guarding.   Skin:     General: Skin is warm and dry.      Coloration: Skin is not jaundiced or pale.   Neurological:      Mental Status: She is alert and oriented to person, place, and time.      Gait: Gait normal.   Psychiatric:         Behavior: Behavior normal.         Thought Content: Thought content normal.         Judgment: Judgment normal.   Administrative Statements   I have spent a total time of 30 minutes in caring for this patient on the day of the visit/encounter including Risks and benefits of tx options, Instructions for management, Patient and family education, Importance of tx compliance, Risk factor reductions, Impressions, Counseling / Coordination of care, Documenting in the medical record, and Reviewing / ordering tests, medicine, procedures  . Topics discussed with the patient / family include medication review, medication adjustment, psychosocial support, and goals of care.    Areli Harrington MD

## 2024-12-17 ENCOUNTER — APPOINTMENT (EMERGENCY)
Dept: CT IMAGING | Facility: HOSPITAL | Age: 49
DRG: 699 | End: 2024-12-17
Payer: COMMERCIAL

## 2024-12-17 ENCOUNTER — HOSPITAL ENCOUNTER (INPATIENT)
Facility: HOSPITAL | Age: 49
LOS: 3 days | Discharge: HOME | DRG: 699 | End: 2024-12-20
Attending: EMERGENCY MEDICINE | Admitting: INTERNAL MEDICINE
Payer: COMMERCIAL

## 2024-12-17 DIAGNOSIS — R10.9 FLANK PAIN: ICD-10-CM

## 2024-12-17 DIAGNOSIS — R10.9 LEFT FLANK PAIN: Primary | ICD-10-CM

## 2024-12-17 DIAGNOSIS — T83.122A DISPLACEMENT OF URETERAL STENT, INITIAL ENCOUNTER (HCC): ICD-10-CM

## 2024-12-17 DIAGNOSIS — D84.9 IMMUNOSUPPRESSED STATUS (HCC): ICD-10-CM

## 2024-12-17 LAB
ANION GAP SERPL CALCULATED.3IONS-SCNC: 4 MMOL/L (ref 4–13)
BACTERIA UR CULT: ABNORMAL
BACTERIA UR CULT: ABNORMAL
BACTERIA UR QL AUTO: ABNORMAL /HPF
BASOPHILS # BLD AUTO: 0.02 THOUSANDS/ΜL (ref 0–0.1)
BASOPHILS NFR BLD AUTO: 0 % (ref 0–1)
BILIRUB UR QL STRIP: NEGATIVE
BUN SERPL-MCNC: 29 MG/DL (ref 5–25)
CALCIUM SERPL-MCNC: 8.7 MG/DL (ref 8.4–10.2)
CHLORIDE SERPL-SCNC: 102 MMOL/L (ref 96–108)
CLARITY UR: CLEAR
CO2 SERPL-SCNC: 30 MMOL/L (ref 21–32)
COLOR UR: COLORLESS
CREAT SERPL-MCNC: 1.12 MG/DL (ref 0.6–1.3)
EOSINOPHIL # BLD AUTO: 0.04 THOUSAND/ΜL (ref 0–0.61)
EOSINOPHIL NFR BLD AUTO: 0 % (ref 0–6)
ERYTHROCYTE [DISTWIDTH] IN BLOOD BY AUTOMATED COUNT: 16.4 % (ref 11.6–15.1)
GFR SERPL CREATININE-BSD FRML MDRD: 59 ML/MIN/1.73SQ M
GLUCOSE SERPL-MCNC: 129 MG/DL (ref 65–140)
GLUCOSE UR STRIP-MCNC: NEGATIVE MG/DL
HCT VFR BLD AUTO: 33.8 % (ref 34.8–46.1)
HGB BLD-MCNC: 9.7 G/DL (ref 11.5–15.4)
HGB UR QL STRIP.AUTO: NEGATIVE
HYPHAE YEAST: PRESENT
IMM GRANULOCYTES # BLD AUTO: 0.03 THOUSAND/UL (ref 0–0.2)
IMM GRANULOCYTES NFR BLD AUTO: 0 % (ref 0–2)
KETONES UR STRIP-MCNC: NEGATIVE MG/DL
LEUKOCYTE ESTERASE UR QL STRIP: ABNORMAL
LYMPHOCYTES # BLD AUTO: 1.43 THOUSANDS/ΜL (ref 0.6–4.47)
LYMPHOCYTES NFR BLD AUTO: 16 % (ref 14–44)
MCH RBC QN AUTO: 24.2 PG (ref 26.8–34.3)
MCHC RBC AUTO-ENTMCNC: 28.7 G/DL (ref 31.4–37.4)
MCV RBC AUTO: 84 FL (ref 82–98)
MONOCYTES # BLD AUTO: 0.55 THOUSAND/ΜL (ref 0.17–1.22)
MONOCYTES NFR BLD AUTO: 6 % (ref 4–12)
NEUTROPHILS # BLD AUTO: 7.13 THOUSANDS/ΜL (ref 1.85–7.62)
NEUTS SEG NFR BLD AUTO: 78 % (ref 43–75)
NITRITE UR QL STRIP: NEGATIVE
NON-SQ EPI CELLS URNS QL MICRO: ABNORMAL /HPF
NRBC BLD AUTO-RTO: 0 /100 WBCS
PH UR STRIP.AUTO: 7.5 [PH]
PLATELET # BLD AUTO: 171 THOUSANDS/UL (ref 149–390)
PMV BLD AUTO: 10.5 FL (ref 8.9–12.7)
POTASSIUM SERPL-SCNC: 4.6 MMOL/L (ref 3.5–5.3)
PROT UR STRIP-MCNC: NEGATIVE MG/DL
RBC # BLD AUTO: 4.01 MILLION/UL (ref 3.81–5.12)
RBC #/AREA URNS AUTO: ABNORMAL /HPF
SODIUM SERPL-SCNC: 136 MMOL/L (ref 135–147)
SP GR UR STRIP.AUTO: 1.01 (ref 1–1.03)
UROBILINOGEN UR STRIP-ACNC: <2 MG/DL
WBC # BLD AUTO: 9.2 THOUSAND/UL (ref 4.31–10.16)
WBC #/AREA URNS AUTO: ABNORMAL /HPF
WBC CLUMPS # UR AUTO: PRESENT /UL

## 2024-12-17 PROCEDURE — 99284 EMERGENCY DEPT VISIT MOD MDM: CPT

## 2024-12-17 PROCEDURE — 96376 TX/PRO/DX INJ SAME DRUG ADON: CPT

## 2024-12-17 PROCEDURE — 96365 THER/PROPH/DIAG IV INF INIT: CPT

## 2024-12-17 PROCEDURE — 36415 COLL VENOUS BLD VENIPUNCTURE: CPT | Performed by: EMERGENCY MEDICINE

## 2024-12-17 PROCEDURE — 81001 URINALYSIS AUTO W/SCOPE: CPT | Performed by: EMERGENCY MEDICINE

## 2024-12-17 PROCEDURE — 87086 URINE CULTURE/COLONY COUNT: CPT | Performed by: EMERGENCY MEDICINE

## 2024-12-17 PROCEDURE — 87040 BLOOD CULTURE FOR BACTERIA: CPT | Performed by: EMERGENCY MEDICINE

## 2024-12-17 PROCEDURE — 85025 COMPLETE CBC W/AUTO DIFF WBC: CPT | Performed by: EMERGENCY MEDICINE

## 2024-12-17 PROCEDURE — 80048 BASIC METABOLIC PNL TOTAL CA: CPT | Performed by: EMERGENCY MEDICINE

## 2024-12-17 PROCEDURE — 96375 TX/PRO/DX INJ NEW DRUG ADDON: CPT

## 2024-12-17 PROCEDURE — 74176 CT ABD & PELVIS W/O CONTRAST: CPT

## 2024-12-17 PROCEDURE — 99223 1ST HOSP IP/OBS HIGH 75: CPT | Performed by: INTERNAL MEDICINE

## 2024-12-17 RX ORDER — ACETAMINOPHEN 325 MG/1
650 TABLET ORAL EVERY 6 HOURS PRN
Status: DISCONTINUED | OUTPATIENT
Start: 2024-12-17 | End: 2024-12-20 | Stop reason: HOSPADM

## 2024-12-17 RX ORDER — HYDROMORPHONE HCL/PF 1 MG/ML
1 SYRINGE (ML) INJECTION EVERY 4 HOURS PRN
Refills: 0 | Status: DISCONTINUED | OUTPATIENT
Start: 2024-12-17 | End: 2024-12-20 | Stop reason: HOSPADM

## 2024-12-17 RX ORDER — SODIUM CHLORIDE 9 MG/ML
75 INJECTION, SOLUTION INTRAVENOUS CONTINUOUS
Status: DISCONTINUED | OUTPATIENT
Start: 2024-12-17 | End: 2024-12-19

## 2024-12-17 RX ORDER — INSULIN LISPRO 100 [IU]/ML
1-5 INJECTION, SOLUTION INTRAVENOUS; SUBCUTANEOUS EVERY 6 HOURS SCHEDULED
Status: DISCONTINUED | OUTPATIENT
Start: 2024-12-18 | End: 2024-12-18

## 2024-12-17 RX ORDER — ONDANSETRON 2 MG/ML
4 INJECTION INTRAMUSCULAR; INTRAVENOUS EVERY 6 HOURS PRN
Status: DISCONTINUED | OUTPATIENT
Start: 2024-12-17 | End: 2024-12-20 | Stop reason: HOSPADM

## 2024-12-17 RX ORDER — HYDROMORPHONE HCL/PF 1 MG/ML
1 SYRINGE (ML) INJECTION ONCE
Refills: 0 | Status: COMPLETED | OUTPATIENT
Start: 2024-12-17 | End: 2024-12-17

## 2024-12-17 RX ORDER — OXYCODONE HYDROCHLORIDE 10 MG/1
10 TABLET ORAL EVERY 4 HOURS PRN
Status: DISCONTINUED | OUTPATIENT
Start: 2024-12-17 | End: 2024-12-20 | Stop reason: HOSPADM

## 2024-12-17 RX ADMIN — SODIUM CHLORIDE, SODIUM LACTATE, POTASSIUM CHLORIDE, AND CALCIUM CHLORIDE 1000 ML: .6; .31; .03; .02 INJECTION, SOLUTION INTRAVENOUS at 19:43

## 2024-12-17 RX ADMIN — OXYCODONE HYDROCHLORIDE 10 MG: 10 TABLET ORAL at 23:53

## 2024-12-17 RX ADMIN — HYDROMORPHONE HYDROCHLORIDE 1 MG: 1 INJECTION, SOLUTION INTRAMUSCULAR; INTRAVENOUS; SUBCUTANEOUS at 19:44

## 2024-12-17 RX ADMIN — HYDROMORPHONE HYDROCHLORIDE 1 MG: 1 INJECTION, SOLUTION INTRAMUSCULAR; INTRAVENOUS; SUBCUTANEOUS at 22:42

## 2024-12-17 RX ADMIN — CEFTRIAXONE SODIUM 1000 MG: 10 INJECTION, POWDER, FOR SOLUTION INTRAVENOUS at 23:50

## 2024-12-17 RX ADMIN — SODIUM CHLORIDE 75 ML/HR: 0.9 INJECTION, SOLUTION INTRAVENOUS at 23:57

## 2024-12-18 DIAGNOSIS — R05.3 CHRONIC COUGH: Primary | ICD-10-CM

## 2024-12-18 PROBLEM — T83.122A URETERAL STENT DISPLACEMENT (HCC): Status: ACTIVE | Noted: 2024-12-18

## 2024-12-18 LAB
ANION GAP SERPL CALCULATED.3IONS-SCNC: 3 MMOL/L (ref 4–13)
BASOPHILS # BLD AUTO: 0.01 THOUSANDS/ΜL (ref 0–0.1)
BASOPHILS NFR BLD AUTO: 0 % (ref 0–1)
BUN SERPL-MCNC: 26 MG/DL (ref 5–25)
CALCIUM SERPL-MCNC: 8.7 MG/DL (ref 8.4–10.2)
CHLORIDE SERPL-SCNC: 103 MMOL/L (ref 96–108)
CO2 SERPL-SCNC: 31 MMOL/L (ref 21–32)
CREAT SERPL-MCNC: 1.03 MG/DL (ref 0.6–1.3)
EOSINOPHIL # BLD AUTO: 0.04 THOUSAND/ΜL (ref 0–0.61)
EOSINOPHIL NFR BLD AUTO: 1 % (ref 0–6)
ERYTHROCYTE [DISTWIDTH] IN BLOOD BY AUTOMATED COUNT: 16.4 % (ref 11.6–15.1)
GFR SERPL CREATININE-BSD FRML MDRD: 64 ML/MIN/1.73SQ M
GLUCOSE SERPL-MCNC: 106 MG/DL (ref 65–140)
GLUCOSE SERPL-MCNC: 168 MG/DL (ref 65–140)
GLUCOSE SERPL-MCNC: 188 MG/DL (ref 65–140)
GLUCOSE SERPL-MCNC: 189 MG/DL (ref 65–140)
GLUCOSE SERPL-MCNC: 203 MG/DL (ref 65–140)
HCT VFR BLD AUTO: 31 % (ref 34.8–46.1)
HGB BLD-MCNC: 9 G/DL (ref 11.5–15.4)
IMM GRANULOCYTES # BLD AUTO: 0.02 THOUSAND/UL (ref 0–0.2)
IMM GRANULOCYTES NFR BLD AUTO: 0 % (ref 0–2)
INR PPP: 1.06 (ref 0.85–1.19)
LYMPHOCYTES # BLD AUTO: 1.62 THOUSANDS/ΜL (ref 0.6–4.47)
LYMPHOCYTES NFR BLD AUTO: 21 % (ref 14–44)
MCH RBC QN AUTO: 24.6 PG (ref 26.8–34.3)
MCHC RBC AUTO-ENTMCNC: 29 G/DL (ref 31.4–37.4)
MCV RBC AUTO: 85 FL (ref 82–98)
MONOCYTES # BLD AUTO: 0.77 THOUSAND/ΜL (ref 0.17–1.22)
MONOCYTES NFR BLD AUTO: 10 % (ref 4–12)
NEUTROPHILS # BLD AUTO: 5.33 THOUSANDS/ΜL (ref 1.85–7.62)
NEUTS SEG NFR BLD AUTO: 68 % (ref 43–75)
NRBC BLD AUTO-RTO: 0 /100 WBCS
PLATELET # BLD AUTO: 139 THOUSANDS/UL (ref 149–390)
PMV BLD AUTO: 10.3 FL (ref 8.9–12.7)
POTASSIUM SERPL-SCNC: 4.5 MMOL/L (ref 3.5–5.3)
PROTHROMBIN TIME: 14.5 SECONDS (ref 12.3–15)
RBC # BLD AUTO: 3.66 MILLION/UL (ref 3.81–5.12)
SODIUM SERPL-SCNC: 137 MMOL/L (ref 135–147)
WBC # BLD AUTO: 7.79 THOUSAND/UL (ref 4.31–10.16)

## 2024-12-18 PROCEDURE — 82948 REAGENT STRIP/BLOOD GLUCOSE: CPT

## 2024-12-18 PROCEDURE — 80048 BASIC METABOLIC PNL TOTAL CA: CPT | Performed by: INTERNAL MEDICINE

## 2024-12-18 PROCEDURE — 85025 COMPLETE CBC W/AUTO DIFF WBC: CPT | Performed by: INTERNAL MEDICINE

## 2024-12-18 PROCEDURE — 0TP98DZ REMOVAL OF INTRALUMINAL DEVICE FROM URETER, VIA NATURAL OR ARTIFICIAL OPENING ENDOSCOPIC: ICD-10-PCS | Performed by: UROLOGY

## 2024-12-18 PROCEDURE — 52310 CYSTOSCOPY AND TREATMENT: CPT | Performed by: UROLOGY

## 2024-12-18 PROCEDURE — 99222 1ST HOSP IP/OBS MODERATE 55: CPT | Performed by: UROLOGY

## 2024-12-18 PROCEDURE — 99285 EMERGENCY DEPT VISIT HI MDM: CPT | Performed by: EMERGENCY MEDICINE

## 2024-12-18 PROCEDURE — 99232 SBSQ HOSP IP/OBS MODERATE 35: CPT

## 2024-12-18 PROCEDURE — 85610 PROTHROMBIN TIME: CPT | Performed by: INTERNAL MEDICINE

## 2024-12-18 RX ORDER — TACROLIMUS 0.5 MG/1
2.5 CAPSULE ORAL 2 TIMES DAILY
Status: DISCONTINUED | OUTPATIENT
Start: 2024-12-18 | End: 2024-12-20 | Stop reason: HOSPADM

## 2024-12-18 RX ORDER — ALBUTEROL SULFATE 90 UG/1
1 INHALANT RESPIRATORY (INHALATION) EVERY 4 HOURS PRN
Status: DISCONTINUED | OUTPATIENT
Start: 2024-12-18 | End: 2024-12-20 | Stop reason: HOSPADM

## 2024-12-18 RX ORDER — ALPRAZOLAM 0.5 MG
1 TABLET ORAL
Status: DISCONTINUED | OUTPATIENT
Start: 2024-12-18 | End: 2024-12-20 | Stop reason: HOSPADM

## 2024-12-18 RX ORDER — ATOVAQUONE 750 MG/5ML
1500 SUSPENSION ORAL DAILY
Status: DISCONTINUED | OUTPATIENT
Start: 2024-12-19 | End: 2024-12-20 | Stop reason: HOSPADM

## 2024-12-18 RX ORDER — AZITHROMYCIN 500 MG/1
250 TABLET, FILM COATED ORAL 3 TIMES WEEKLY
Status: DISCONTINUED | OUTPATIENT
Start: 2024-12-18 | End: 2024-12-20 | Stop reason: HOSPADM

## 2024-12-18 RX ORDER — VALACYCLOVIR HYDROCHLORIDE 500 MG/1
500 TABLET, FILM COATED ORAL DAILY
Status: DISCONTINUED | OUTPATIENT
Start: 2024-12-19 | End: 2024-12-20 | Stop reason: HOSPADM

## 2024-12-18 RX ORDER — PROMETHAZINE HYDROCHLORIDE AND CODEINE PHOSPHATE 6.25; 1 MG/5ML; MG/5ML
5 SYRUP ORAL EVERY 4 HOURS PRN
Qty: 118 ML | Refills: 0 | Status: SHIPPED | OUTPATIENT
Start: 2024-12-18

## 2024-12-18 RX ORDER — INSULIN GLARGINE 100 [IU]/ML
12 INJECTION, SOLUTION SUBCUTANEOUS EVERY MORNING
Status: DISCONTINUED | OUTPATIENT
Start: 2024-12-19 | End: 2024-12-20 | Stop reason: HOSPADM

## 2024-12-18 RX ORDER — PANTOPRAZOLE SODIUM 40 MG/1
40 TABLET, DELAYED RELEASE ORAL
Status: DISCONTINUED | OUTPATIENT
Start: 2024-12-19 | End: 2024-12-20 | Stop reason: HOSPADM

## 2024-12-18 RX ORDER — OXYBUTYNIN CHLORIDE 5 MG/1
15 TABLET, EXTENDED RELEASE ORAL
Status: DISCONTINUED | OUTPATIENT
Start: 2024-12-18 | End: 2024-12-20 | Stop reason: HOSPADM

## 2024-12-18 RX ORDER — FAMOTIDINE 20 MG/1
40 TABLET, FILM COATED ORAL
Status: DISCONTINUED | OUTPATIENT
Start: 2024-12-18 | End: 2024-12-20 | Stop reason: HOSPADM

## 2024-12-18 RX ORDER — INSULIN LISPRO 100 [IU]/ML
1-5 INJECTION, SOLUTION INTRAVENOUS; SUBCUTANEOUS
Status: DISCONTINUED | OUTPATIENT
Start: 2024-12-19 | End: 2024-12-20 | Stop reason: HOSPADM

## 2024-12-18 RX ORDER — BUDESONIDE AND FORMOTEROL FUMARATE DIHYDRATE 160; 4.5 UG/1; UG/1
2 AEROSOL RESPIRATORY (INHALATION) EVERY 12 HOURS
Status: DISCONTINUED | OUTPATIENT
Start: 2024-12-18 | End: 2024-12-20 | Stop reason: HOSPADM

## 2024-12-18 RX ORDER — PREGABALIN 75 MG/1
150 CAPSULE ORAL 2 TIMES DAILY
Status: DISCONTINUED | OUTPATIENT
Start: 2024-12-18 | End: 2024-12-20 | Stop reason: HOSPADM

## 2024-12-18 RX ADMIN — FAMOTIDINE 40 MG: 20 TABLET, FILM COATED ORAL at 21:36

## 2024-12-18 RX ADMIN — TACROLIMUS 2.5 MG: 0.5 CAPSULE ORAL at 20:13

## 2024-12-18 RX ADMIN — OXYCODONE HYDROCHLORIDE 10 MG: 10 TABLET ORAL at 20:18

## 2024-12-18 RX ADMIN — OXYCODONE HYDROCHLORIDE 10 MG: 10 TABLET ORAL at 11:00

## 2024-12-18 RX ADMIN — HYDROMORPHONE HYDROCHLORIDE 1 MG: 1 INJECTION, SOLUTION INTRAMUSCULAR; INTRAVENOUS; SUBCUTANEOUS at 13:42

## 2024-12-18 RX ADMIN — HYDROMORPHONE HYDROCHLORIDE 1 MG: 1 INJECTION, SOLUTION INTRAMUSCULAR; INTRAVENOUS; SUBCUTANEOUS at 03:52

## 2024-12-18 RX ADMIN — OXYBUTYNIN CHLORIDE 15 MG: 5 TABLET, EXTENDED RELEASE ORAL at 21:36

## 2024-12-18 RX ADMIN — BUDESONIDE AND FORMOTEROL FUMARATE DIHYDRATE 2 PUFF: 160; 4.5 AEROSOL RESPIRATORY (INHALATION) at 21:35

## 2024-12-18 RX ADMIN — CEFTRIAXONE SODIUM 1000 MG: 10 INJECTION, POWDER, FOR SOLUTION INTRAVENOUS at 22:31

## 2024-12-18 RX ADMIN — INSULIN LISPRO 1 UNITS: 100 INJECTION, SOLUTION INTRAVENOUS; SUBCUTANEOUS at 06:21

## 2024-12-18 RX ADMIN — PREGABALIN 150 MG: 75 CAPSULE ORAL at 20:13

## 2024-12-18 RX ADMIN — HYDROMORPHONE HYDROCHLORIDE 1 MG: 1 INJECTION, SOLUTION INTRAMUSCULAR; INTRAVENOUS; SUBCUTANEOUS at 22:26

## 2024-12-18 RX ADMIN — INSULIN LISPRO 1 UNITS: 100 INJECTION, SOLUTION INTRAVENOUS; SUBCUTANEOUS at 00:54

## 2024-12-18 RX ADMIN — HYDROMORPHONE HYDROCHLORIDE 1 MG: 1 INJECTION, SOLUTION INTRAMUSCULAR; INTRAVENOUS; SUBCUTANEOUS at 18:06

## 2024-12-18 RX ADMIN — HYDROMORPHONE HYDROCHLORIDE 1 MG: 1 INJECTION, SOLUTION INTRAMUSCULAR; INTRAVENOUS; SUBCUTANEOUS at 08:45

## 2024-12-18 RX ADMIN — APIXABAN 5 MG: 5 TABLET, FILM COATED ORAL at 20:13

## 2024-12-18 RX ADMIN — SODIUM CHLORIDE 75 ML/HR: 0.9 INJECTION, SOLUTION INTRAVENOUS at 13:40

## 2024-12-18 RX ADMIN — PANCRELIPASE 48000 UNITS: 120000; 24000; 76000 CAPSULE, DELAYED RELEASE PELLETS ORAL at 20:16

## 2024-12-18 RX ADMIN — AZITHROMYCIN 250 MG: 500 TABLET, FILM COATED ORAL at 20:13

## 2024-12-18 NOTE — ED PROVIDER NOTES
Time reflects when diagnosis was documented in both MDM as applicable and the Disposition within this note       Time User Action Codes Description Comment    12/17/2024 11:07 PM Yanique Funk [R10.9] Left flank pain     12/17/2024 11:07 PM Yanique Funk [T83.122A] Displacement of ureteral stent, initial encounter ([facility])     12/17/2024 11:08 PM Yanique Funk [D84.9] Immunosuppressed status ([facility])           ED Disposition       ED Disposition   Admit    Condition   Stable    Date/Time   Tue Dec 17, 2024 11:04 PM    Comment   Case was discussed with Dr Serrano and the patient's admission status was agreed to be Admission Status: inpatient status to the service of Dr. Serrano.               Assessment & Plan       Medical Decision Making  This is a 49-year-old female who presents here today with left flank pain.  She has a history of kidney stones, had surgery in September with stent placement and residual stone burden and is scheduled for delayed removal of stent with evaluation of residual stones via cystoscopy 12/26.  She did have admission for UTI and pyelonephritis shortly after stent was placed, as well as other unrelated complications delaying initially planned procedure.  She says she has overall been doing well related to the stent, but this afternoon started having recurrence of pain.  She says it is primarily the left CVA region occasionally radiating down the left side.  She has had nausea associated with it.  She denies fevers.  She has no urinary symptoms but that she does not usually get them with UTIs.  She has a history of cystic fibrosis, status post lung transplant on chronic immunosuppression.  She denies any changes in her bowels.  She did have preoperative labs done yesterday that showed creatinine of 1.14, up minimally from baseline of 0.7-1.0.  She had a white count of 11.32.  Urine culture grew out 20-29,000 CFU beta-hemolytic strep group B and  mixed contaminants x 3, but urinalysis or microscopy was not done.    Review of systems: Otherwise negative unless stated as above    She is uncomfortable appearing, no acute distress.  She does have CVA tenderness is well as the on the left lateral side.  She has no abdominal tenderness.  Exam is otherwise unremarkable.  Concern is for recurrent kidney stone, infection, complications related to the stent.  We will check lab work and CT scan to evaluate, and treat symptoms.    White count has returned to normal today.  Urinalysis shows moderate leukocytes but negative nitrites, and 20-30 white blood cells with yeast and white blood cell clumps present.  She has had Candida grow out on cultures previously.  CT scan shows malpositioned stent with 1 to 2 mm nonobstructing calculus in the left kidney as well as findings consistent with esophagitis.  I discussed the patient with urology, who recommends keeping the patient overnight, keeping her n.p.o., for discussion of possible stent removal in the morning, possibly at bedside.  She thinks urine might be contaminated but would recommend covering with antibiotics for possible infection.     Problems Addressed:  Displacement of ureteral stent, initial encounter (HCC): acute illness or injury  Immunosuppressed status (HCC): chronic illness or injury with exacerbation, progression, or side effects of treatment  Left flank pain: acute illness or injury    Amount and/or Complexity of Data Reviewed  External Data Reviewed: radiology and notes.  Labs: ordered. Decision-making details documented in ED Course.  Radiology: ordered and independent interpretation performed. Decision-making details documented in ED Course.    Risk  Prescription drug management.  Decision regarding hospitalization.             Medications   HYDROmorphone (DILAUDID) injection 1 mg (1 mg Intravenous Given 12/17/24 1944)   lactated ringers bolus 1,000 mL (0 mL Intravenous Stopped 12/17/24 2043)    HYDROmorphone (DILAUDID) injection 1 mg (1 mg Intravenous Given 12/17/24 2242)   ceftriaxone (ROCEPHIN) 1 g/50 mL in dextrose IVPB (0 mg Intravenous Stopped 12/18/24 0040)       ED Risk Strat Scores                          SBIRT 20yo+      Flowsheet Row Most Recent Value   Initial Alcohol Screen: US AUDIT-C     1. How often do you have a drink containing alcohol? 0 Filed at: 12/17/2024 1807   2. How many drinks containing alcohol do you have on a typical day you are drinking?  0 Filed at: 12/17/2024 1807   3b. FEMALE Any Age, or MALE 65+: How often do you have 4 or more drinks on one occassion? 0 Filed at: 12/17/2024 1807   Audit-C Score 0 Filed at: 12/17/2024 1807   NICOLE: How many times in the past year have you...    Used an illegal drug or used a prescription medication for non-medical reasons? Never Filed at: 12/17/2024 1807                            History of Present Illness       Chief Complaint   Patient presents with    Flank Pain     L sided flank pain started hours ago, scheduled for scope on 12/26. Hx of lung transplant, denies fevers.          Past Medical History:   Diagnosis Date    Abnormal uterine bleeding (AUB) 7/7/2022    ABPA (allergic bronchopulmonary aspergillosis) (HCC) 07/08/2012    Acute kidney injury (HCC) 09/08/2017    Acute on chronic respiratory failure (HCC) 03/04/2017    Last Assessment & Plan:  Formatting of this note might be different from the original. Recurrent with clinical worsening with enlarging PTX, s/p multiple chest tubes, but new fevers 6/19 and worsening leukocytosis indicating infection as cause of 6/19 decompensation with profound hypoxia in setting of shunting through right lung and continued pneumothorax s/p 2nd chest tube placement.  6/27: Right    Anaphylaxis 06/06/2022    Anemia     Asthma     Chest pain 09/21/2021    Chronic pain     Chronic respiratory failure with hypoxia (HCC) 09/07/2021    COVID-19 06/03/2022    Cystic fibrosis (HCC)     Deep vein  thrombosis (HCC)     Diabetes mellitus (HCC)     Fever 09/22/2024    GERD (gastroesophageal reflux disease)     HBP (high blood pressure)     HCAP (healthcare-associated pneumonia) 03/06/2020    History of transfusion     Influenza B 02/23/2020    Kidney stone     Moderate protein-calorie malnutrition (HCC) 01/08/2022    Neutropenic fever  (HCC) 09/26/2020    Pneumonia due to Pseudomonas species (HCC) 10/28/2010    Last Assessment & Plan:  Formatting of this note might be different from the original. Polymicrobial pna w/ pseudomonas, M. Abscessus, Cadida glabrata/albicans    PONV (postoperative nausea and vomiting)     Pseudomonas aeruginosa infection 09/20/2012    Renal calculus     Squamous cell skin cancer 01/12/2023    SCCIS- Left upper thigh    Transplant recipient     Lung- 2013, 2017    Ulcerative colitis (HCC)     Wellness examination 02/04/2013    Formatting of this note might be different from the original. Discussed with patient and acknowledgement form signed on 2/4/2013 (ALYSHA)      Past Surgical History:   Procedure Laterality Date    APPENDECTOMY      AUGMENTATION MAMMAPLASTY Bilateral 2004    AUGMENTATION MAMMAPLASTY Right 2021    removed     BREAST SURGERY      Aug.     BRONCHOSCOPY      CHOLECYSTECTOMY      COLONOSCOPY      EGD  07/2019    Yeast Infect.     FL RETROGRADE PYELOGRAM  01/09/2020    FL RETROGRADE PYELOGRAM  02/04/2020    FL RETROGRADE PYELOGRAM  9/20/2024    GASTROSCOPY  03/01/2024    GASTROSTOMY TUBE, PLACE      IR BALLOON-OCCLUDED ANTEGRADE TRANSVENOUS OBLITERATION (BATO)  11/22/2022    IR PORT PLACEMENT  03/22/2022    IR PORT REMOVAL  01/10/2022    IR PORT STRIPPING  11/25/2022    IR TUNNELED CENTRAL LINE PLACEMENT  01/13/2022    LUNG SURGERY      LUNG TRANSPLANT      X2    SD CYSTO BLADDER W/URETERAL CATHETERIZATION Right 01/09/2020    Procedure: CYSTOSCOPY RETROGRADE PYELOGRAM WITH INSERTION STENT URETERAL;  Surgeon: Patricio Perry MD;  Location: [facility];  Service: Urology     MT CYSTO/URETERO W/LITHOTRIPSY &INDWELL STENT INSRT Right 2020    Procedure: CYSTOSCOPY URETEROSCOPY WITH LITHOTRIPSY HOLMIUM LASER, RETROGRADE PYELOGRAM AND INSERTION STENT URETERAL;  Surgeon: Patricio Perry MD;  Location: [facility];  Service: Urology    MT CYSTO/URETERO W/LITHOTRIPSY &INDWELL STENT INSRT Left 2024    Procedure: CYSTOSCOPY URETEROSCOPY WITH LITHOTRIPSY HOLMIUM LASER, RETROGRADE PYELOGRAM AND INSERTION STENT URETERAL, BASKET STONE EXTRACTION;  Surgeon: Lee Briceno MD;  Location:  MAIN OR;  Service: Urology    SKIN BIOPSY      US GUIDED VASCULAR ACCESS  2017      Family History   Problem Relation Age of Onset    Alcohol abuse Mother     Mental illness Mother     Depression Mother     Alcohol abuse Father     COPD Maternal Grandmother     Lung cancer Maternal Grandmother 65    Cancer Maternal Grandmother     COPD Maternal Grandfather     Cancer Maternal Grandfather     COPD Paternal Grandmother     Lung cancer Paternal Grandmother 65    No Known Problems Sister     No Known Problems Paternal Aunt     Breast cancer Neg Hx     Ovarian cancer Neg Hx     Colon cancer Neg Hx       Social History     Tobacco Use    Smoking status: Former     Current packs/day: 0.00     Average packs/day: 0.5 packs/day for 12.0 years (6.0 ttl pk-yrs)     Types: Cigarettes     Start date: 1993     Quit date: 2005     Years since quittin.9     Passive exposure: Past    Smokeless tobacco: Never   Vaping Use    Vaping status: Never Used   Substance Use Topics    Alcohol use: Yes     Alcohol/week: 2.0 standard drinks of alcohol     Types: 2 Glasses of wine per week     Comment: social    Drug use: Yes     Frequency: 2.0 times per week     Types: Marijuana     Comment: medical edible marijuana prescribed      E-Cigarette/Vaping    E-Cigarette Use Never User       E-Cigarette/Vaping Substances    Nicotine No     THC No     CBD No     Flavoring No     Other No     Unknown No       I have  reviewed and agree with the history as documented.       Flank Pain      Review of Systems   Genitourinary:  Positive for flank pain.           Objective       ED Triage Vitals   Temperature Pulse Blood Pressure Respirations SpO2 Patient Position - Orthostatic VS   12/17/24 1745 12/17/24 1745 12/17/24 1745 12/17/24 1745 12/17/24 1745 12/17/24 1745   98.2 °F (36.8 °C) 90 165/88 19 93 % Sitting      Temp Source Heart Rate Source BP Location FiO2 (%) Pain Score    12/17/24 1745 12/17/24 1745 12/17/24 1745 -- 12/17/24 1842    Temporal Monitor Left arm  8      Vitals      Date and Time Temp Pulse SpO2 Resp BP Pain Score FACES Pain Rating User   12/18/24 0031 -- 72 92 % 16 130/78 2 --    12/17/24 2353 -- -- -- -- -- 7 --    12/17/24 2314 -- 78 94 % -- 138/81 -- --    12/17/24 2246 -- 87 95 % 22 158/75 -- --    12/17/24 2242 -- -- -- -- -- 10 - Worst Possible Pain --    12/17/24 1944 -- -- -- -- -- 8 --    12/17/24 1842 -- 82 96 % 16 191/118 8 --    12/17/24 1745 98.2 °F (36.8 °C) 90 93 % 19 165/88 -- -- KW            Physical Exam  Vitals and nursing note reviewed.   Constitutional:       General: She is not in acute distress.     Appearance: She is well-developed.      Comments: Uncomfortable appearing   HENT:      Head: Normocephalic and atraumatic.   Eyes:      Conjunctiva/sclera: Conjunctivae normal.      Pupils: Pupils are equal, round, and reactive to light.   Neck:      Trachea: No tracheal deviation.   Cardiovascular:      Rate and Rhythm: Normal rate and regular rhythm.      Heart sounds: Normal heart sounds.   Pulmonary:      Effort: Pulmonary effort is normal. No respiratory distress.      Breath sounds: Normal breath sounds.   Abdominal:      General: Bowel sounds are normal. There is no distension.      Palpations: Abdomen is soft.      Tenderness: There is no abdominal tenderness. There is left CVA tenderness.   Musculoskeletal:      Cervical back: Normal range of motion.   Skin:     General:  Skin is warm and dry.   Neurological:      Mental Status: She is alert and oriented to person, place, and time.      GCS: GCS eye subscore is 4. GCS verbal subscore is 5. GCS motor subscore is 6.   Psychiatric:         Mood and Affect: Mood and affect normal.         Behavior: Behavior normal.         Results Reviewed       Procedure Component Value Units Date/Time    Fingerstick Glucose (POCT) [[id number]]  (Abnormal) Collected: 12/18/24 0049    Lab Status: Final result Specimen: Blood Updated: 12/18/24 0053     POC Glucose 203 mg/dl     Blood culture #2 [[id number]] Collected: 12/17/24 2328    Lab Status: In process Specimen: Blood from Hand, Left Updated: 12/17/24 2332    Blood culture #1 [[id number]] Collected: 12/17/24 2322    Lab Status: In process Specimen: Blood from Hand, Right Updated: 12/17/24 2332    Urine Microscopic [[id number]]  (Abnormal) Collected: 12/17/24 1948    Lab Status: Final result Specimen: Urine, Clean Catch Updated: 12/17/24 2015     RBC, UA 1-2 /hpf      WBC, UA 20-30 /hpf      Epithelial Cells Occasional /hpf      Bacteria, UA Occasional /hpf      WBC Clumps Present     Hyphae Yeast Present    Urine culture [[id number]] Collected: 12/17/24 1948    Lab Status: In process Specimen: Urine, Clean Catch Updated: 12/17/24 2015    Basic metabolic panel [[id number]]  (Abnormal) Collected: 12/17/24 1932    Lab Status: Final result Specimen: Blood from Vein Updated: 12/17/24 1959     Sodium 136 mmol/L      Potassium 4.6 mmol/L      Chloride 102 mmol/L      CO2 30 mmol/L      ANION GAP 4 mmol/L      BUN 29 mg/dL      Creatinine 1.12 mg/dL      Glucose 129 mg/dL      Calcium 8.7 mg/dL      eGFR 59 ml/min/1.73sq m     Narrative:      National Kid[employer] Foundation guidelines for Chronic Kidney Disease (CKD):     Stage 1 with normal or high GFR (GFR > 90 mL/min/1.73 square meters)    Stage 2 Mild CKD (GFR = 60-89 mL/min/1.73 square meters)    Stage 3A Moderate CKD (GFR = 45-59 mL/min/1.73 square  meters)    Stage 3B Moderate CKD (GFR = 30-44 mL/min/1.73 square meters)    Stage 4 Severe CKD (GFR = 15-29 mL/min/1.73 square meters)    Stage 5 End Stage CKD (GFR <15 mL/min/1.73 square meters)  Note: GFR calculation is accurate only with a steady state creatinine    UA w Reflex to Microscopic w Reflex to Culture [[id number]]  (Abnormal) Collected: 12/17/24 1948    Lab Status: Final result Specimen: Urine, Clean Catch Updated: 12/17/24 1957     Color, UA Colorless     Clarity, UA Clear     Specific Gravity, UA 1.012     pH, UA 7.5     Leukocytes, UA Moderate     Nitrite, UA Negative     Protein, UA Negative mg/dl      Glucose, UA Negative mg/dl      Ketones, UA Negative mg/dl      Urobilinogen, UA <2.0 mg/dl      Bilirubin, UA Negative     Occult Blood, UA Negative    CBC and differential [[id number]]  (Abnormal) Collected: 12/17/24 1932    Lab Status: Final result Specimen: Blood from Vein Updated: 12/17/24 1938     WBC 9.20 Thousand/uL      RBC 4.01 Million/uL      Hemoglobin 9.7 g/dL      Hematocrit 33.8 %      MCV 84 fL      MCH 24.2 pg      MCHC 28.7 g/dL      RDW 16.4 %      MPV 10.5 fL      Platelets 171 Thousands/uL      nRBC 0 /100 WBCs      Segmented % 78 %      Immature Grans % 0 %      Lymphocytes % 16 %      Monocytes % 6 %      Eosinophils Relative 0 %      Basophils Relative 0 %      Absolute Neutrophils 7.13 Thousands/µL      Absolute Immature Grans 0.03 Thousand/uL      Absolute Lymphocytes 1.43 Thousands/µL      Absolute Monocytes 0.55 Thousand/µL      Eosinophils Absolute 0.04 Thousand/µL      Basophils Absolute 0.02 Thousands/µL             CT renal stone study abdomen pelvis without contrast   Final Interpretation by Yovani Zamorano MD (12/17 2120)      1.  Malpositioned the left ureteral stent with its pigtail in the distal ureter and the remainder of the stent coiled within the bladder. No significant hydronephrosis.   2.  1 to 2 mm nonobstructive calculus in the left kidney.   3.  Moderate  thickening of the distal esophagus which may be due to esophagitis. The stomach is distended with fluid.   4.  Other findings as above.      Workstation performed: [id number]             Procedures    ED Medication and Procedure Management   Prior to Admission Medications   Prescriptions Last Dose Informant Patient Reported? Taking?   ALPRAZolam (XANAX) 1 mg tablet   No No   Sig: Take 1 tablet (1 mg total) by mouth daily at bedtime as needed for anxiety or sleep   Ascorbic Acid (vitamin C) 100 MG tablet  Self Yes No   Sig: Take 100 mg by mouth daily   BD Pen Needle Abi U/F 32G X 4 MM MISC  Self No No   Si times daily   Cholecalciferol (Vitamin D3) 125 MCG (5000 UT) TABS  Self Yes No   Sig: Take 5,000 Units by mouth daily   Continuous Blood Gluc  (Dexcom G7 ) SUKHI  Self No No   Sig: Use 1 each continuous   Continuous Glucose Sensor (Dexcom G7 Sensor)  Self No No   Sig: Use 1 Device every 10 days   Insulin Disposable Pump (Omnipod 5 G6 Intro, Gen 5,) KIT  Self No No   Sig: Use 1 Units continuous   Insulin Disposable Pump (Omnipod 5 G6 Pods, Gen 5,) MISC  Self No No   Sig: Use 1 Units every other day   Insulin Glargine-yfgn (Semglee, yfgn,) 100 UNIT/ML SOPN  Self No No   Sig: Inject 0.12 mL (12 Units total) under the skin every morning   Insulin Pen Needle (Comfort EZ Pen Needles) 33G X 4 MM MISC  Self No No   Sig: Use to inject insulin 4 times a day   Respiratory Therapy Supplies (Nebulizer) SUKHI  Self Yes No   Sig: Please dispense nebulizer machine   Turmeric 500 MG CAPS  Self Yes No   Sig: Take by mouth   acetaminophen (TYLENOL) 325 mg tablet  Self No No   Sig: Take 3 tablets (975 mg total) by mouth every 8 (eight) hours   albuterol (2.5 mg/3 mL) 0.083 % nebulizer solution  Self No No   Sig: Take 6 mL (5 mg total) by nebulization 2 (two) times a day   albuterol (PROVENTIL HFA,VENTOLIN HFA) 90 mcg/act inhaler  Self Yes No   Sig: INHALE 2 PUFFS EVERY 8 HOURS AS NEEDED FOR SHORTNESS OF BREATH OR  WHEEZING.   apixaban (ELIQUIS) 5 mg  Self No No   Sig: Take 1 tablet (5 mg total) by mouth 2 (two) times a day   atovaquone (MEPRON) 750 mg/5 mL suspension  Self Yes No   Sig: Take 1,500 mg by mouth daily   azelastine (OPTIVAR) 0.05 % ophthalmic solution   No No   Sig: Administer 1 drop to both eyes 2 (two) times a day   azithromycin (ZITHROMAX) 250 mg tablet  Self Yes No   Sig: Take 250 mg by mouth 3 (three) times a week   bisacodyl (DULCOLAX) 5 mg EC tablet  Self No No   Sig: Take 1 tablet (5 mg total) by mouth daily at bedtime   budesonide-formoterol (SYMBICORT) 160-4.5 mcg/act inhaler  Self Yes No   Sig: Inhale 2 puffs every 12 (twelve) hours   cyclobenzaprine (FLEXERIL) 5 mg tablet   No No   Sig: Take 1 tablet (5 mg total) by mouth 3 (three) times a day as needed for muscle spasms for up to 14 days   dexlansoprazole (DEXILANT) 60 MG capsule  Self No No   Sig: TAKE 1 CAPSULE BY MOUTH EVERY DAY IN THE MORNING   dicyclomine (BENTYL) 10 mg capsule   No No   Sig: Take 2 capsules (20 mg total) by mouth 4 (four) times a day (before meals and at bedtime)   dronabinol (MARINOL) 5 MG capsule  Self No No   Sig: Take 1 capsule (5 mg total) by mouth 3 (three) times a day   famotidine (PEPCID) 40 MG tablet   No No   Sig: TAKE 1 TABLET BY MOUTH EVERYDAY AT BEDTIME   fluconazole (DIFLUCAN) 150 mg tablet   Yes No   Sig: Take 150 mg by mouth once   Patient not taking: Reported on 10/24/2024   furosemide (LASIX) 20 mg tablet  Self Yes No   Sig: Take 40 mg by mouth daily   Patient not taking: Reported on 12/17/2024   guaiFENesin (ROBITUSSIN) 100 MG/5ML oral liquid   No No   Sig: Take 20 mL (400 mg total) by mouth 3 (three) times a day as needed for cough   insulin aspart (NovoLOG FlexPen) 100 UNIT/ML injection pen  Self No No   Sig: INJECT 10 UNITS pre-BREAKFAST, 8 UNITS pre-LUNCH and 12 UNITS pre-DINNER plus scale; max 50 UNITS/day   insulin glargine (LANTUS) 100 units/mL subcutaneous injection   No No   Sig: Inject 12 Units  under the skin every morning   Patient not taking: Reported on 2024   insulin lispro (HumALOG/ADMELOG) 100 units/mL injection   No No   Sig: Inject 10 Units under the skin daily with breakfast   Patient not taking: Reported on 2024   insulin lispro (HumALOG/ADMELOG) 100 units/mL injection   No No   Sig: Inject 12 Units under the skin daily with dinner   Patient not taking: Reported on 2024   insulin lispro (HumALOG/ADMELOG) 100 units/mL injection   No No   Sig: Inject 8 Units under the skin daily with lunch   Patient not taking: Reported on 2024   multivitamin (THERAGRAN) TABS  Self Yes No   Sig: Take 1 tablet by mouth daily   Patient not taking: Reported on 11/10/2024   naloxone (NARCAN) 4 mg/0.1 mL nasal spray  Self No No   Si.1 mL (4 mg total) into each nostril every 3 (three) minutes as needed for opioid reversal or respiratory depression   ondansetron (ZOFRAN) 4 mg tablet  Self Yes No   Sig: Take 4 mg by mouth every 8 (eight) hours as needed for nausea   oxyCODONE (ROXICODONE) 10 MG TABS   No No   Sig: Take 1 tablet (10 mg total) by mouth every 4 (four) hours as needed for moderate pain or severe pain Max Daily Amount: 60 mg   oxybutynin (DITROPAN XL) 15 MG 24 hr tablet  Self No No   Sig: Take 1 tablet (15 mg total) by mouth daily at bedtime   pancrelipase, Lip-Prot-Amyl, (CREON) 24,000 units  Self Yes No   Sig: Take 48,000 Units by mouth 3 (three) times a day with meals   pantoprazole (PROTONIX) 40 mg tablet   No No   Sig: Take 1 tablet (40 mg total) by mouth 2 (two) times a day before meals   polyethylene glycol (MIRALAX) 17 g packet  Self No No   Sig: Take 17 g by mouth 2 (two) times a day   polymyxin b-trimethoprim (POLYTRIM) ophthalmic solution   No No   Sig: Administer 1 drop to both eyes every 4 (four) hours   predniSONE 10 mg tablet  Self Yes No   Sig: Take 10 mg by mouth daily Taking 15 mg now   pregabalin (LYRICA) 150 mg capsule  Self No No   Sig: Take 1 capsule (150 mg  total) by mouth 2 times a day   sucralfate (CARAFATE) 1 g tablet   No No   Sig: Take 1 tablet (1 g total) by mouth 3 (three) times a day before meals   tacrolimus (PROGRAF) 1 mg capsule  Self No No   Sig: Take 2 capsules (2 mg total) by mouth 2 (two) times a day   tamsulosin (FLOMAX) 0.4 mg  Self No No   Sig: Take 1 capsule (0.4 mg total) by mouth daily with dinner   Patient not taking: Reported on 11/10/2024   valACYclovir (VALTREX) 500 mg tablet  Self Yes No   Sig: Take 500 mg by mouth daily   vitamin B-12 (VITAMIN B-12) 1,000 mcg tablet  Self Yes No   Sig: Take by mouth daily      Facility-Administered Medications: None     Patient's Medications   Discharge Prescriptions    No medications on file     No discharge procedures on file.  ED SEPSIS DOCUMENTATION   Time reflects when diagnosis was documented in both MDM as applicable and the Disposition within this note       Time User Action Codes Description Comment    12/17/2024 11:07 PM Yanique Funk [R10.9] Left flank pain     12/17/2024 11:07 PM Yanique Funk [T83.122A] Displacement of ureteral stent, initial encounter (HCC)     12/17/2024 11:08 PM Yanique Funk [D84.9] Immunosuppressed status (HCC)                  Yanique Funk MD  12/18/24 0144

## 2024-12-18 NOTE — PROGRESS NOTES
Progress Note - Hospitalist   Name: Maximilian Mann 49 y.o. female I MRN: [id number]  Unit/Bed#: [facility] Date of Admission: 12/17/2024   Date of Service: 12/18/2024 I Hospital Day: 1    Assessment & Plan  Flank pain  48-year-old female with history of cystic fibrosis, type 1 diabetes with a history of kidney stone in September 2024 with stent placed.  Is scheduled for cystoscopy and stent removal on 12/26.  Presented with left flank pain with associated nausea.  CT scan revealed migrated stent in the distal ureter.  Noted 1 to 2 mm residual stone with no hydro  UA revealed moderate leukocytes, WBC and occasional bacteria  Urology following, plan for stent removal today  Possible OR vs bedside removal  Continue Ceftriaxone   Follow-up urine culture    Type 1 diabetes mellitus with diabetic chronic kidney disease, unspecified CKD stage (HCC)    Lab Results   Component Value Date    HGBA1C 7.8 (H) 12/16/2024   Continue home Lantus  Sliding scale insulin  Hold mealtime insulin as n.p.o.  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 Sequential Compression Devices Ordered.    Mobility:   Basic Mobility Inpatient Raw Score: 23  JH-HLM Goal: 7: Walk 25 feet or more  JH-HLM Achieved: 7: Walk 25 feet or more  JH-HLM Goal achieved. Continue to encourage appropriate mobility.    Patient Centered Rounds: I performed bedside rounds with nursing staff today.    Discussions with Specialists or Other Care Team Provider:   Urology    Education and Discussions with Family / Patient: Patient declined call to .     Current Length of Stay: 1 day(s)  Current Patient Status: Inpatient   Certification Statement: The patient will continue to require additional inpatient hospital stay due to need for urologic intervention  Discharge Plan: Anticipate discharge later today or tomorrow to home.    Code Status: Level 1 - Full Code    Subjective   Patient seen and examined resting in bed. Reports ongoing flank pain. No additional complaints at this time.     Objective :  Temp:  [97.7 °F (36.5 °C)-99.1 °F (37.3 °C)] 99.1 °F (37.3 °C)  HR:  [72-90] 75  BP: (124-191)/() 138/71  Resp:  [16-22] 18  SpO2:  [92 %-96 %] 94 %  O2 Device: None (Room air)    Body mass index is 21.39 kg/m².     Input and Output Summary (last 24 hours):     Intake/Output Summary (Last 24 hours) at 12/18/2024 1308  Last data filed at 12/18/2024 1000  Gross per 24 hour   Intake 0 ml   Output 950 ml   Net -950 ml       Physical Exam  Vitals and nursing note reviewed.   Constitutional:       General: She is not in acute distress.  HENT:      Head: Normocephalic.      Mouth/Throat:      Mouth: Mucous membranes are moist.   Eyes:      General: No scleral icterus.  Cardiovascular:      Rate and Rhythm: Normal rate and regular rhythm.      Pulses: Normal pulses.      Heart sounds: Normal heart sounds.   Pulmonary:      Effort: Pulmonary effort is normal.   Abdominal:      General: There is no distension.      Palpations: Abdomen is soft.      Tenderness: There is no abdominal tenderness.   Musculoskeletal:         General: No swelling.   Skin:     General: Skin is warm.   Neurological:      Mental Status: She is alert and oriented to person, place, and time.      Motor: No weakness.   Psychiatric:         Mood and Affect: Mood normal.         Behavior: Behavior normal.         Thought Content: Thought  content normal.           Lines/Drains:  Lines/Drains/Airways       Active Status       Name Placement date Placement time Site Days    Port A Cath 11/25/22 Right Chest 11/25/22  0953  Chest  754                    Central Line:  Goal for removal: N/A - Chronic PICC               Lab Results: I have reviewed the following results:   Results from last 7 days   Lab Units 12/18/24  0518   WBC Thousand/uL 7.79   HEMOGLOBIN g/dL 9.0*   HEMATOCRIT % 31.0*   PLATELETS Thousands/uL 139*   SEGS PCT % 68   LYMPHO PCT % 21   MONO PCT % 10   EOS PCT % 1     Results from last 7 days   Lab Units 12/18/24  0518   SODIUM mmol/L 137   POTASSIUM mmol/L 4.5   CHLORIDE mmol/L 103   CO2 mmol/L 31   BUN mg/dL 26*   CREATININE mg/dL 1.03   ANION GAP mmol/L 3*   CALCIUM mg/dL 8.7   GLUCOSE RANDOM mg/dL 189*     Results from last 7 days   Lab Units 12/18/24  0518   INR  1.06     Results from last 7 days   Lab Units 12/18/24  1240 12/18/24  0522 12/18/24  0049   POC GLUCOSE mg/dl 106 168* 203*     Results from last 7 days   Lab Units 12/16/24  1218   HEMOGLOBIN A1C % 7.8*           Recent Cultures (last 7 days):   Results from last 7 days   Lab Units 12/17/24  2328 12/17/24  2322 12/16/24  1218   BLOOD CULTURE  Received in Microbiology Lab. Culture in Progress. Received in Microbiology Lab. Culture in Progress.  --    URINE CULTURE   --   --  20,000-29,000 cfu/ml Beta Hemolytic Streptococcus Group B*  >100,000 cfu/ml       Imaging Results Review: No pertinent imaging studies reviewed.  Other Study Results Review: No additional pertinent studies reviewed.    Last 24 Hours Medication List:     Current Facility-Administered Medications:     acetaminophen (TYLENOL) tablet 650 mg, Q6H PRN    ceftriaxone (ROCEPHIN) 1 g/50 mL in dextrose IVPB, Q24H    HYDROmorphone (DILAUDID) injection 1 mg, Q4H PRN    insulin lispro (HumALOG/ADMELOG) 100 units/mL subcutaneous injection 1-5 Units, Q6H DOTTY **AND** Fingerstick Glucose (POCT), Q6H    ondansetron  (ZOFRAN) injection 4 mg, Q6H PRN    oxyCODONE (ROXICODONE) immediate release tablet 10 mg, Q4H PRN    sodium chloride 0.9 % infusion, Continuous, Last Rate: 75 mL/hr (12/17/24 8952)    Administrative Statements   Today, Patient Was Seen By: WAYNE Templeton  I have spent a total time of 32 minutes in caring for this patient on the day of the visit/encounter including Prognosis, Patient and family education, Importance of tx compliance, Risk factor reductions, Impressions, Counseling / Coordination of care, Reviewing / ordering tests, medicine, procedures  , Obtaining or reviewing history  , and Communicating with other healthcare professionals .    **Please Note: This note may have been constructed using a voice recognition system.**

## 2024-12-18 NOTE — ASSESSMENT & PLAN NOTE
Baseline creatinine approximately 0.7-1  Noted to have creatinine of 1.1  Continue to monitor kidney function

## 2024-12-18 NOTE — ED NOTES
SBAR sent to Charge Nurse, patient is off to the Unit, AAOx3 resp even and unlabored with no S$S of distress.      Keyon Montero RN  12/18/24 7407

## 2024-12-18 NOTE — ASSESSMENT & PLAN NOTE
Lab Results   Component Value Date    HGBA1C 7.8 (H) 12/16/2024       Recent Labs     12/18/24  0049 12/18/24  0522   POCGLU 203* 168*       Blood Sugar Average: Last 72 hrs:  (P) 185.5

## 2024-12-18 NOTE — UTILIZATION REVIEW
NOTIFICATION OF INPATIENT ADMISSION   AUTHORIZATION REQUEST   SERVICING FACILITY:   [place] [address]  Tax ID: [id number]  NPI: [id number] ATTENDING PROVIDER:  Attending Name and NPI#: Gabriel Littlejohn Do [[id number]]  Address: [address] [place] [address]  Phone: [phone]     ADMISSION INFORMATION:  Place of Service: Inpatient Acute [facility]  Place of Service Code: 21  Inpatient Admission Date/Time: 12/17/24 11:08 PM  Discharge Date/Time: No discharge date for patient encounter.  Admitting Diagnosis Code/Description:  Flank pain [R10.9]  Left flank pain [R10.9]  Immunosuppressed status (HCC) [D84.9]  Displacement of ureteral stent, initial encounter ([facility]) [T83.122A]     UTILIZATION REVIEW CONTACT:  Tata Moreno, Utilization   Network Utilization Review Department  Phone: [phone]  Fax [phone]  Email: [email or web address]  Contact for approvals/pending authorizations, clinical reviews, and discharge.     PHYSICIAN ADVISORY SERVICES:  Medical Necessity Denial & Spzc-ue-Vudf Review  Phone: [phone]  Fax: [phone]  Email: [email or web address]     DISCHARGE SUPPORT TEAM:  For Patients Discharge Needs & Updates  Phone: [phone] opt. 2 Fax: [phone]  Email: [email or web address]

## 2024-12-18 NOTE — H&P
H&P - Hospitalist   Name: Maximilian Mann 48 y.o. female I MRN: [id number]  Unit/Bed#: FT 02 I Date of Admission: 12/17/2024   Date of Service: 12/17/2024 I Hospital Day: 0     Assessment & Plan  Flank pain  48-year-old female with history of cystic fibrosis, type 1 diabetes with a history of kidney stone in September 2024 with stent placed.  Is scheduled for cystoscopy and stent removal on 12/26.  Presented with left flank pain with associated nausea.  CT scan revealed migrated stent in the distal ureter.  Noted 1 to 2 mm residual stone with no hydro  UA revealed moderate leukocytes, WBC and occasional bacteria  Case discussed with urology recommended observation  N.p.o. for tentative stent removal tomorrow  Will provide ceftriaxone  Follow-up urine culture  Follow-up formal urology recommendations  Type 1 diabetes mellitus with diabetic chronic kidney disease, unspecified CKD stage (Formerly [facility])    Lab Results   Component Value Date    HGBA1C 7.8 (H) 12/16/2024   Continue home Lantus  Sliding scale insulin  Hold mealtime insulin as n.p.o.  GERD (gastroesophageal reflux disease)  Continue PPI and Pepcid  Cystic fibrosis (HCC)  History of cystic fibrosis on tacrolimus/prednisone  Pulmonary embolus (HCC)  Continue Eliquis  Stage 3a chronic kidney disease (Formerly [facility])  Baseline creatinine approximately 0.7-1  Noted to have creatinine of 1.1  Continue to monitor kidney function  Hx of lung transplant (HCC)  History of 2 previous lung transplants on tacrolimus/prednisone  Will continue      VTE Pharmacologic Prophylaxis:   Moderate Risk (Score 3-4) - Pharmacological DVT Prophylaxis Ordered: apixaban (Eliquis).  Code Status: full code  Discussion with family: Patient declined call to .     Anticipated Length of Stay: Patient will be admitted on an inpatient basis with an anticipated length of stay of greater than 2 midnights secondary to intractable flank pain.    History of Present Illness   Chief Complaint: Flank  jacqueline    Maximilian Mann is a 48 y.o. female with past medical history significant type 1 diabetes, cystic fibrosis, lung transplant on tacrolimus initially presented with flank pain.  Patient reports ongoing flank pain that began earlier this afternoon with associated nausea.  Was scheduled for cystoscopy and stent removal on 12/26.  Recently had stent placed in September.  She otherwise denies any acute complaints.  Denies fevers, chills, cough, shortness of breath, diarrhea, chest pain or any other complaints  Review of Systems   Constitutional:  Negative for activity change, appetite change, chills, diaphoresis, fever and unexpected weight change.   HENT:  Negative for congestion, facial swelling and rhinorrhea.    Eyes:  Negative for photophobia and visual disturbance.   Respiratory:  Negative for cough, shortness of breath and wheezing.    Cardiovascular:  Negative for chest pain and palpitations.   Gastrointestinal:  Positive for abdominal pain and nausea. Negative for blood in stool, constipation, diarrhea and vomiting.   Genitourinary:  Negative for decreased urine volume, difficulty urinating, dysuria, flank pain, frequency, hematuria and urgency.   Musculoskeletal:  Negative for arthralgias, back pain, joint swelling and myalgias.   Neurological:  Negative for dizziness, syncope, facial asymmetry, light-headedness, numbness and headaches.   Psychiatric/Behavioral:  Negative for confusion and decreased concentration. The patient is not nervous/anxious.        Historical Information   Past Medical History:   Diagnosis Date    Abnormal uterine bleeding (AUB) 7/7/2022    ABPA (allergic bronchopulmonary aspergillosis) ([facility]) 07/08/2012    Acute kidney injury (HCC) 09/08/2017    Acute on chronic respiratory failure ([facility]) 03/04/2017    Last Assessment & Plan:  Formatting of this note might be different from the original. Recurrent with clinical worsening with enlarging PTX, s/p multiple chest tubes, but new  fevers 6/19 and worsening leukocytosis indicating infection as cause of 6/19 decompensation with profound hypoxia in setting of shunting through right lung and continued pneumothorax s/p 2nd chest tube placement.  6/27: Right    Anaphylaxis 06/06/2022    Anemia     Asthma     Chest pain 09/21/2021    Chronic pain     Chronic respiratory failure with hypoxia (HCC) 09/07/2021    COVID-19 06/03/2022    Cystic fibrosis (HCC)     Deep vein thrombosis (HCC)     Diabetes mellitus (HCC)     Fever 09/22/2024    GERD (gastroesophageal reflux disease)     HBP (high blood pressure)     HCAP (healthcare-associated pneumonia) 03/06/2020    History of transfusion     Influenza B 02/23/2020    Kidney stone     Moderate protein-calorie malnutrition (HCC) 01/08/2022    Neutropenic fever  (HCC) 09/26/2020    Pneumonia due to Pseudomonas species ([facility]) 10/28/2010    Last Assessment & Plan:  Formatting of this note might be different from the original. Polymicrobial pna w/ pseudomonas, M. Abscessus, Cadida glabrata/albicans    PONV (postoperative nausea and vomiting)     Pseudomonas aeruginosa infection 09/20/2012    Renal calculus     Squamous cell skin cancer 01/12/2023    SCCIS- Left upper thigh    Transplant recipient     Lung- 2013, 2017    Ulcerative colitis ([facility])     Wellness examination 02/04/2013    Formatting of this note might be different from the original. Discussed with patient and acknowledgement form signed on 2/4/2013 (ALYSHA)     Past Surgical History:   Procedure Laterality Date    APPENDECTOMY      AUGMENTATION MAMMAPLASTY Bilateral 2004    AUGMENTATION MAMMAPLASTY Right 2021    removed     BREAST SURGERY      Aug.     BRONCHOSCOPY      CHOLECYSTECTOMY      COLONOSCOPY      EGD  07/2019    Y[facility] Infect.     FL RETROGRADE PYELOGRAM  01/09/2020    FL RETROGRADE PYELOGRAM  02/04/2020    FL RETROGRADE PYELOGRAM  9/20/2024    GASTROSCOPY  03/01/2024    GASTROSTOMY TUBE, PLACE      IR BALLOON-OCCLUDED ANTEGRADE TRANSVENOUS  OBLITERATION (BATO)  2022    IR PORT PLACEMENT  2022    IR PORT REMOVAL  01/10/2022    IR PORT STRIPPING  2022    IR TUNNELED CENTRAL LINE PLACEMENT  2022    LUNG SURGERY      LUNG TRANSPLANT      X2    MO CYSTO BLADDER W/URETERAL CATHETERIZATION Right 2020    Procedure: CYSTOSCOPY RETROGRADE PYELOGRAM WITH INSERTION STENT URETERAL;  Surgeon: Patricio Perry MD;  Location: [facility];  Service: Urology    MO CYSTO/URETERO W/LITHOTRIPSY &INDWELL STENT INSRT Right 2020    Procedure: CYSTOSCOPY URETEROSCOPY WITH LITHOTRIPSY HOLMIUM LASER, RETROGRADE PYELOGRAM AND INSERTION STENT URETERAL;  Surgeon: Patricio Perry MD;  Location: [facility];  Service: Urology    MO CYSTO/URETERO W/LITHOTRIPSY &INDWELL STENT INSRT Left 2024    Procedure: CYSTOSCOPY URETEROSCOPY WITH LITHOTRIPSY HOLMIUM LASER, RETROGRADE PYELOGRAM AND INSERTION STENT URETERAL, BASKET STONE EXTRACTION;  Surgeon: Lee Briceno MD;  Location: [facility];  Service: Urology    SKIN BIOPSY      US GUIDED VASCULAR ACCESS  2017     Social History     Tobacco Use    Smoking status: Former     Current packs/day: 0.00     Average packs/day: 0.5 packs/day for 12.0 years (6.0 ttl pk-yrs)     Types: Cigarettes     Start date: 1993     Quit date: 2005     Years since quittin.9     Passive exposure: Past    Smokeless tobacco: Never   Vaping Use    Vaping status: Never Used   Substance and Sexual Activity    Alcohol use: Yes     Alcohol/week: 2.0 standard drinks of alcohol     Types: 2 Glasses of wine per week     Comment: social    Drug use: Yes     Frequency: 2.0 times per week     Types: Marijuana     Comment: medical edible marijuana prescribed    Sexual activity: Yes     Partners: Male     Birth control/protection: None     E-Cigarette/Vaping    E-Cigarette Use Never User      E-Cigarette/Vaping Substances    Nicotine No     THC No     CBD No     Flavoring No     Other No     Unknown No       Family History   Problem Relation Age of Onset    Alcohol abuse Mother     Mental illness Mother     Depression Mother     Alcohol abuse Father     COPD Maternal Grandmother     Lung cancer Maternal Grandmother 65    Cancer Maternal Grandmother     COPD Maternal Grandfather     Cancer Maternal Grandfather     COPD Paternal Grandmother     Lung cancer Paternal Grandmother 65    No Known Problems Sister     No Known Problems Paternal Aunt     Breast cancer Neg Hx     Ovarian cancer Neg Hx     Colon cancer Neg Hx      Social History:  Marital Status: /Civil Union     Patient Pre-hospital Living Situation: Home  Patient Pre-hospital Level of Mobility: walks  Patient Pre-hospital Diet Restrictions: dm    Meds/Allergies   I have reviewed home medications with patient personally.  Prior to Admission medications    Medication Sig Start Date End Date Taking? Authorizing Provider   acetaminophen (TYLENOL) 325 mg tablet Take 3 tablets (975 mg total) by mouth every 8 (eight) hours 9/26/21   WAYNE Paredes   albuterol (2.5 mg/3 mL) 0.083 % nebulizer solution Take 6 mL (5 mg total) by nebulization 2 (two) times a day 9/26/21   WAYNE Paredes   albuterol (PROVENTIL HFA,VENTOLIN HFA) 90 mcg/act inhaler INHALE 2 PUFFS EVERY 8 HOURS AS NEEDED FOR SHORTNESS OF BREATH OR WHEEZING. 11/29/23   Historical Provider, MD   ALPRAZolam (XANAX) 1 mg tablet Take 1 tablet (1 mg total) by mouth daily at bedtime as needed for anxiety or sleep 12/12/24   Gwendolyn Nuñez MD   apixaban (ELIQUIS) 5 mg Take 1 tablet (5 mg total) by mouth 2 (two) times a day 6/5/23 12/17/24  Solis Avila MD   Ascorbic Acid (vitamin C) 100 MG tablet Take 100 mg by mouth daily    Historical Provider, MD   atovaquone (MEPRON) 750 mg/5 mL suspension Take 1,500 mg by mouth daily    Historical Provider, MD   azelastine (OPTIVAR) 0.05 % ophthalmic solution Administer 1 drop to both eyes 2 (two) times a day 12/4/24   Wally Patel MD   azithromycin  (ZITHROMAX) 250 mg tablet Take 250 mg by mouth 3 (three) times a week 1/6/23   Historical Provider, MD   BD Pen Needle Abi U/F 32G X 4 MM MISC 4 times daily 6/19/24   Eve Polk MD   bisacodyl (DULCOLAX) 5 mg EC tablet Take 1 tablet (5 mg total) by mouth daily at bedtime 5/7/23   Armand Juarez MD   budesonide-formoterol (SYMBICORT) 160-4.5 mcg/act inhaler Inhale 2 puffs every 12 (twelve) hours 9/12/24   Historical Provider, MD   Cholecalciferol (Vitamin D3) 125 MCG (5000 UT) TABS Take 5,000 Units by mouth daily    Historical Provider, MD   Continuous Blood Gluc  (Dexcom G7 ) SUKHI Use 1 each continuous 7/31/23   Eve Polk MD   Continuous Glucose Sensor (Dexcom G7 Sensor) Use 1 Device every 10 days 5/20/24   Eve Polk MD   cyclobenzaprine (FLEXERIL) 5 mg tablet Take 1 tablet (5 mg total) by mouth 3 (three) times a day as needed for muscle spasms for up to 14 days 11/18/24 12/2/24  WAYNE Saavedra   dexlansoprazole (DEXILANT) 60 MG capsule TAKE 1 CAPSULE BY MOUTH EVERY DAY IN THE MORNING 7/19/24   WAYNE Nunez   dicyclomine (BENTYL) 10 mg capsule Take 2 capsules (20 mg total) by mouth 4 (four) times a day (before meals and at bedtime) 11/18/24 12/18/24  WAYNE Saavedra   dronabinol (MARINOL) 5 MG capsule Take 1 capsule (5 mg total) by mouth 3 (three) times a day 9/11/23   Gwendolyn Nuñez MD   famotidine (PEPCID) 40 MG tablet TAKE 1 TABLET BY MOUTH EVERYDAY AT BEDTIME 10/24/24   Gwendolyn Nuñez MD   fluconazole (DIFLUCAN) 150 mg tablet Take 150 mg by mouth once  Patient not taking: Reported on 10/24/2024 8/7/24   Historical Provider, MD   furosemide (LASIX) 20 mg tablet Take 40 mg by mouth daily  Patient not taking: Reported on 12/17/2024    Historical Provider, MD   guaiFENesin (ROBITUSSIN) 100 MG/5ML oral liquid Take 20 mL (400 mg total) by mouth 3 (three) times a day as needed for cough 12/16/24   Areli Harrington MD   insulin aspart (NovoLOG  FlexPen) 100 UNIT/ML injection pen INJECT 10 UNITS pre-BREAKFAST, 8 UNITS pre-LUNCH and 12 UNITS pre-DINNER plus scale; max 50 UNITS/day 9/19/24   Eve Polk MD   Insulin Disposable Pump (Omnipod 5 G6 Intro, Gen 5,) KIT Use 1 Units continuous 3/6/24   Eve Polk MD   Insulin Disposable Pump (Omnipod 5 G6 Pods, Gen 5,) MISC Use 1 Units every other day 3/6/24   Eve Polk MD   insulin glargine (LANTUS) 100 units/mL subcutaneous injection Inject 12 Units under the skin every morning  Patient not taking: Reported on 12/16/2024 11/19/24   WAYNE Saavedra   Insulin Glargine-yfgn (Semglee, yfgn,) 100 UNIT/ML SOPN Inject 0.12 mL (12 Units total) under the skin every morning 8/26/24   Eve Polk MD   insulin lispro (HumALOG/ADMELOG) 100 units/mL injection Inject 10 Units under the skin daily with breakfast  Patient not taking: Reported on 12/16/2024 11/19/24   WAYNE Saavedra   insulin lispro (HumALOG/ADMELOG) 100 units/mL injection Inject 12 Units under the skin daily with dinner  Patient not taking: Reported on 12/16/2024 11/18/24   WAYNE Saavedra   insulin lispro (HumALOG/ADMELOG) 100 units/mL injection Inject 8 Units under the skin daily with lunch  Patient not taking: Reported on 12/16/2024 11/19/24   WAYNE Saavedra   Insulin Pen Needle (Comfort EZ Pen Needles) 33G X 4 MM MISC Use to inject insulin 4 times a day 11/3/22   Eve Polk MD   multivitamin (THERAGRAN) TABS Take 1 tablet by mouth daily  Patient not taking: Reported on 11/10/2024    Historical Provider, MD   naloxone (NARCAN) 4 mg/0.1 mL nasal spray 0.1 mL (4 mg total) into each nostril every 3 (three) minutes as needed for opioid reversal or respiratory depression 6/13/24   Solis Avila MD   ondansetron (ZOFRAN) 4 mg tablet Take 4 mg by mouth every 8 (eight) hours as needed for nausea 3/21/24   Historical Provider, MD   oxybutynin (DITROPAN XL) 15 MG 24 hr tablet Take 1 tablet (15 mg  total) by mouth daily at bedtime 9/3/24   Gianna Rob PA-C   oxyCODONE (ROXICODONE) 10 MG TABS Take 1 tablet (10 mg total) by mouth every 4 (four) hours as needed for moderate pain or severe pain Max Daily Amount: 60 mg 12/12/24 1/11/25  Gwendolyn Nuñez MD   pancrelipase, Lip-Prot-Amyl, (CREON) 24,000 units Take 48,000 Units by mouth 3 (three) times a day with meals    Historical Provider, MD   pantoprazole (PROTONIX) 40 mg tablet Take 1 tablet (40 mg total) by mouth 2 (two) times a day before meals 11/18/24 12/18/24  WAYNE Saavedra   polyethylene glycol (MIRALAX) 17 g packet Take 17 g by mouth 2 (two) times a day 5/7/23   Armand Juarez MD   polymyxin b-trimethoprim (POLYTRIM) ophthalmic solution Administer 1 drop to both eyes every 4 (four) hours 12/4/24   Wally Patel MD   predniSONE 10 mg tablet Take 10 mg by mouth daily Taking 15 mg now 4/27/21   Historical Provider, MD   pregabalin (LYRICA) 150 mg capsule Take 1 capsule (150 mg total) by mouth 2 times a day 7/18/24   Gwendolyn Nuñez MD   Respiratory Therapy Supplies (Nebulizer) SUKHI Please dispense nebulizer machine 7/16/21   Historical Provider, MD   sucralfate (CARAFATE) 1 g tablet Take 1 tablet (1 g total) by mouth 3 (three) times a day before meals 11/18/24 12/18/24  WAYNE Saavedra   tacrolimus (PROGRAF) 1 mg capsule Take 2 capsules (2 mg total) by mouth 2 (two) times a day 9/26/24   Andrea Dos Santos PA-C   tamsulosin (FLOMAX) 0.4 mg Take 1 capsule (0.4 mg total) by mouth daily with dinner  Patient not taking: Reported on 11/10/2024 9/26/24   Andrea Dos Santos PA-C   Turmeric 500 MG CAPS Take by mouth    Historical Provider, MD   valACYclovir (VALTREX) 500 mg tablet Take 500 mg by mouth daily 6/1/21   Historical Provider, MD   vitamin B-12 (VITAMIN B-12) 1,000 mcg tablet Take by mouth daily    Historical Provider, MD   traZODone (DESYREL) 50 mg tablet Take 0.5 tablets (25 mg total) by mouth daily at bedtime 9/26/21 11/8/22  Stephanie Nieto,  CRNP     Allergies   Allergen Reactions    Bebtelovimab Anaphylaxis    Vancomycin Angioedema and Hives    Gabapentin Hallucinations    Ceftazidime Headache     Severe headaches after desensitization    Nsaids Other (See Comments)     Lung transplant increases risk of renal toxicity, call lung txp provider to discuss if needed       Objective :  Temp:  [98.2 °F (36.8 °C)] 98.2 °F (36.8 °C)  HR:  [82-90] 87  BP: (158-191)/() 158/75  Resp:  [16-22] 22  SpO2:  [93 %-96 %] 95 %  O2 Device: None (Room air)    Physical Exam  Constitutional:       General: She is not in acute distress.     Appearance: She is well-developed. She is not diaphoretic.   HENT:      Head: Normocephalic and atraumatic.      Nose: Nose normal.      Mouth/Throat:      Pharynx: No oropharyngeal exudate.   Eyes:      General: No scleral icterus.        Right eye: No discharge.         Left eye: No discharge.      Conjunctiva/sclera: Conjunctivae normal.   Neck:      Thyroid: No thyromegaly.      Vascular: No JVD.   Cardiovascular:      Rate and Rhythm: Normal rate and regular rhythm.      Heart sounds: Normal heart sounds. No murmur heard.     No friction rub. No gallop.   Pulmonary:      Effort: Pulmonary effort is normal. No respiratory distress.      Breath sounds: Normal breath sounds. No wheezing or rales.   Chest:      Chest wall: No tenderness.   Abdominal:      General: Bowel sounds are normal. There is no distension.      Palpations: Abdomen is soft.      Tenderness: There is no abdominal tenderness. There is no guarding or rebound.   Musculoskeletal:         General: No tenderness or deformity. Normal range of motion.      Cervical back: Normal range of motion and neck supple.   Skin:     General: Skin is warm and dry.      Findings: No erythema or rash.   Neurological:      Mental Status: She is alert. Mental status is at baseline.      Cranial Nerves: No cranial nerve deficit.      Sensory: No sensory deficit.      Motor: No abnormal  muscle tone.      Coordination: Coordination normal.          Lines/Drains:      Central Line:  Goal for removal: Will discontinue when peripheral access obtained.              Lab Results: I have reviewed the following results:  Results from last 7 days   Lab Units 12/17/24 1932   WBC Thousand/uL 9.20   HEMOGLOBIN g/dL 9.7*   HEMATOCRIT % 33.8*   PLATELETS Thousands/uL 171   SEGS PCT % 78*   LYMPHO PCT % 16   MONO PCT % 6   EOS PCT % 0     Results from last 7 days   Lab Units 12/17/24 1932   SODIUM mmol/L 136   POTASSIUM mmol/L 4.6   CHLORIDE mmol/L 102   CO2 mmol/L 30   BUN mg/dL 29*   CREATININE mg/dL 1.12   ANION GAP mmol/L 4   CALCIUM mg/dL 8.7   GLUCOSE RANDOM mg/dL 129             Lab Results   Component Value Date    HGBA1C 7.8 (H) 12/16/2024    HGBA1C 7.5 (H) 09/21/2024    HGBA1C 7.6 (H) 05/27/2024           Imaging Results Review: I personally reviewed the following image studies/reports in PACS and discussed pertinent findings with Radiology: chest xray. My interpretation of the radiology images/reports is:  .  Other Study Results Review: EKG was reviewed.     Administrative Statements   I have spent a total time of 76 minutes in caring for this patient on the day of the visit/encounter including Diagnostic results.    ** Please Note: This note has been constructed using a voice recognition system. **

## 2024-12-18 NOTE — ASSESSMENT & PLAN NOTE
48-year-old female with history of cystic fibrosis, type 1 diabetes with a history of kidney stone in September 2024 with stent placed.  Is scheduled for cystoscopy and stent removal on 12/26.  Presented with left flank pain with associated nausea.  CT scan revealed migrated stent in the distal ureter.  Noted 1 to 2 mm residual stone with no hydro  UA revealed moderate leukocytes, WBC and occasional bacteria  Case discussed with urology recommended observation  N.p.o. for tentative stent removal tomorrow  Will provide ceftriaxone  Follow-up urine culture  Follow-up formal urology recommendations

## 2024-12-18 NOTE — PLAN OF CARE
Problem: PAIN - ADULT  Goal: Verbalizes/displays adequate comfort level or baseline comfort level  Description: Interventions:  - Encourage patient to monitor pain and request assistance  - Assess pain using appropriate pain scale  - Administer analgesics based on type and severity of pain and evaluate response  - Implement non-pharmacological measures as appropriate and evaluate response  - Consider cultural and social influences on pain and pain management  - Notify physician/advanced practitioner if interventions unsuccessful or patient reports new pain  Outcome: Progressing     Problem: INFECTION - ADULT  Goal: Absence or prevention of progression during hospitalization  Description: INTERVENTIONS:  - Assess and monitor for signs and symptoms of infection  - Monitor lab/diagnostic results  - Monitor all insertion sites, i.e. indwelling lines, tubes, and drains  - Monitor endotracheal if appropriate and nasal secretions for changes in amount and color  - Hosmer appropriate cooling/warming therapies per order  - Administer medications as ordered  - Instruct and encourage patient and family to use good hand hygiene technique  - Identify and instruct in appropriate isolation precautions for identified infection/condition  Outcome: Progressing  Goal: Absence of fever/infection during neutropenic period  Description: INTERVENTIONS:  - Monitor WBC    Outcome: Progressing     Problem: SAFETY ADULT  Goal: Patient will remain free of falls  Description: INTERVENTIONS:  - Educate patient/family on patient safety including physical limitations  - Instruct patient to call for assistance with activity   - Consult OT/PT to assist with strengthening/mobility   - Keep Call bell within reach  - Keep bed low and locked with side rails adjusted as appropriate  - Keep care items and personal belongings within reach  - Initiate and maintain comfort rounds  - Make Fall Risk Sign visible to staff  - Offer Toileting every 2 Hours,  in advance of need  - Initiate/Maintain bed alarm  - Obtain necessary fall risk management equipment: bed alarm  - Apply yellow socks and bracelet for high fall risk patients  - Consider moving patient to room near nurses station  Outcome: Progressing     Problem: DISCHARGE PLANNING  Goal: Discharge to home or other facility with appropriate resources  Description: INTERVENTIONS:  - Identify barriers to discharge w/patient and caregiver  - Arrange for needed discharge resources and transportation as appropriate  - Identify discharge learning needs (meds, wound care, etc.)  - Arrange for interpretive services to assist at discharge as needed  - Refer to Case Management Department for coordinating discharge planning if the patient needs post-hospital services based on physician/advanced practitioner order or complex needs related to functional status, cognitive ability, or social support system  Outcome: Progressing     Problem: Knowledge Deficit  Goal: Patient/family/caregiver demonstrates understanding of disease process, treatment plan, medications, and discharge instructions  Description: Complete learning assessment and assess knowledge base.  Interventions:  - Provide teaching at level of understanding  - Provide teaching via preferred learning methods  Outcome: Progressing     Problem: GENITOURINARY - ADULT  Goal: Maintains or returns to baseline urinary function  Description: INTERVENTIONS:  - Assess urinary function  - Encourage oral fluids to ensure adequate hydration if ordered  - Administer IV fluids as ordered to ensure adequate hydration  - Administer ordered medications as needed  - Offer frequent toileting  - Follow urinary retention protocol if ordered  Outcome: Progressing  Goal: Absence of urinary retention  Description: INTERVENTIONS:  - Assess patient’s ability to void and empty bladder  - Monitor I/O  - Bladder scan as needed  - Discuss with physician/AP medications to alleviate retention as  needed  - Discuss catheterization for long term situations as appropriate  Outcome: Progressing

## 2024-12-18 NOTE — PLAN OF CARE
Problem: PAIN - ADULT  Goal: Verbalizes/displays adequate comfort level or baseline comfort level  Description: Interventions:  - Encourage patient to monitor pain and request assistance  - Assess pain using appropriate pain scale  - Administer analgesics based on type and severity of pain and evaluate response  - Implement non-pharmacological measures as appropriate and evaluate response  - Consider cultural and social influences on pain and pain management  - Notify physician/advanced practitioner if interventions unsuccessful or patient reports new pain  Outcome: Progressing     Problem: INFECTION - ADULT  Goal: Absence or prevention of progression during hospitalization  Description: INTERVENTIONS:  - Assess and monitor for signs and symptoms of infection  - Monitor lab/diagnostic results  - Monitor all insertion sites, i.e. indwelling lines, tubes, and drains  - Monitor endotracheal if appropriate and nasal secretions for changes in amount and color  - [place] appropriate cooling/warming therapies per order  - Administer medications as ordered  - Instruct and encourage patient and family to use good hand hygiene technique  - Identify and instruct in appropriate isolation precautions for identified infection/condition  Outcome: Progressing  Goal: Absence of fever/infection during neutropenic period  Description: INTERVENTIONS:  - Monitor WBC    Outcome: Progressing     Problem: SAFETY ADULT  Goal: Patient will remain free of falls  Description: INTERVENTIONS:  - Educate patient/family on patient safety including physical limitations  - Instruct patient to call for assistance with activity   - Consult OT/PT to assist with strengthening/mobility   - Keep Call bell within reach  - Keep bed low and locked with side rails adjusted as appropriate  - Keep care items and personal belongings within reach  - Initiate and maintain comfort rounds  - Make Fall Risk Sign visible to staff  - patient ambulates  independently  - Apply yellow socks and bracelet for high fall risk patients  - Consider moving patient to room near nurses station  Outcome: Progressing     Problem: DISCHARGE PLANNING  Goal: Discharge to home or other facility with appropriate resources  Description: INTERVENTIONS:  - Identify barriers to discharge w/patient and caregiver  - Arrange for needed discharge resources and transportation as appropriate  - Identify discharge learning needs (meds, wound care, etc.)  - Arrange for interpretive services to assist at discharge as needed  - Refer to Case Management Department for coordinating discharge planning if the patient needs post-hospital services based on physician/advanced practitioner order or complex needs related to functional status, cognitive ability, or social support system  Outcome: Progressing     Problem: Knowledge Deficit  Goal: Patient/family/caregiver demonstrates understanding of disease process, treatment plan, medications, and discharge instructions  Description: Complete learning assessment and assess knowledge base.  Interventions:  - Provide teaching at level of understanding  - Provide teaching via preferred learning methods  Outcome: Progressing     Problem: GENITOURINARY - ADULT  Goal: Maintains or returns to baseline urinary function  Description: INTERVENTIONS:  - Assess urinary function  - Encourage oral fluids to ensure adequate hydration if ordered  - Administer IV fluids as ordered to ensure adequate hydration  - Administer ordered medications as needed  - Offer frequent toileting  - Follow urinary retention protocol if ordered  Outcome: Progressing  Goal: Absence of urinary retention  Description: INTERVENTIONS:  - Assess patient’s ability to void and empty bladder  - Monitor I/O  - Bladder scan as needed  - Discuss with physician/AP medications to alleviate retention as needed  - Discuss catheterization for long term situations as appropriate  Outcome: Progressing

## 2024-12-18 NOTE — PROCEDURES
The patient was brought to the PACU holding area.    We discussed that her CT scan showed migration of her stent after it stayed in correct position for almost 3 months.  She is not sure why it migrated as there were no inciting event.  We discussed since her CT scan did not show any obvious ureteral stones and only punctate renal stones it seems safe to remove the stent rather than going back to operating room.  She agreed with this.    Written consent was obtained for cystoscopy and stent removal after discussing risks and benefits.    A female chaperone nurse was in the room.    The patient was prepped and draped in standard sterile fashion using Hibiclens and a sterile drape.    A timeout was performed identifying the correct patient site and procedure    A flexible scope was introduced into the bladder.  The stent was immediately visible.  As expected it was almost entirely within the bladder.  It was easily grasped with a pair of Olympus 1 time use disposable flexible grasping forceps and easily removed.  The patient tolerated procedure well.    The patient will go back up to her room and be watched overnight and if she is doing well discharge tomorrow.  She will stay on antibiotics given the possibility of infection and given her history of lung transplantation.    Plan for follow-up ultrasound in 1 month.

## 2024-12-18 NOTE — ASSESSMENT & PLAN NOTE
48-year-old female with history of cystic fibrosis, type 1 diabetes with a history of kidney stone in September 2024 with stent placed.  Is scheduled for cystoscopy and stent removal on 12/26.  Presented with left flank pain with associated nausea.  CT scan revealed migrated stent in the distal ureter.  Noted 1 to 2 mm residual stone with no hydro  UA revealed moderate leukocytes, WBC and occasional bacteria  Urology following, plan for stent removal today  Possible OR vs bedside removal  Continue Ceftriaxone   Follow-up urine culture

## 2024-12-18 NOTE — ASSESSMENT & PLAN NOTE
Lab Results   Component Value Date    HGBA1C 7.8 (H) 12/16/2024   Continue home Lantus  Sliding scale insulin  Hold mealtime insulin as n.p.o.

## 2024-12-18 NOTE — ASSESSMENT & PLAN NOTE
Lab Results   Component Value Date    EGFR 64 12/18/2024    EGFR 59 12/17/2024    EGFR 57 12/16/2024    CREATININE 1.03 12/18/2024    CREATININE 1.12 12/17/2024    CREATININE 1.14 12/16/2024

## 2024-12-18 NOTE — ASSESSMENT & PLAN NOTE
Ureteral stent displacement/malpositioned  CT pigtail in distal left ureter remained urine the urinary bladder, no significant hydronephrosis  This is causing patient with significant pain this a.m.  She has small 1 to 2 mm calculus in the left kidney, likely to pass.  Unable to facilitate discharge  Discussed with patient ureteral stent removal.  Possible to do this at bedside however need a stent grasper, and bedside cystoscopy.  Added on for OR for possible removal today.  Keep patient n.p.o. for now  Consent signed

## 2024-12-18 NOTE — CASE MANAGEMENT
Case Management Assessment & Discharge Planning Note    Patient name Maximilian Mann  Location /-01 MRN [id number]  : 1975 Date 2024       Current Admission Date: 2024  Current Admission Diagnosis:Flank pain   Patient Active Problem List    Diagnosis Date Noted Date Diagnosed    Ureteral stent displacement (HCC) 2024     Flank pain 2024     Thrombocytopenia ([facility]) 2024     Bacterial conjunctivitis 2024     Positive culture findings in sputum 2024     Chest pain 2024     Pancytopenia, acquired ([facility]) 10/24/2024     History of pulmonary embolus (PE) 2024     History of DVT (deep vein thrombosis) 2024     Sore throat 2024     Vaginal yeast infection 2024     Intestinal metaplasia of antrum of stomach without dysplasia 2024     Hx of lung transplant ([facility]) 01/15/2024     Continuous opioid dependence ([facility]) 01/15/2024     Stage 3a chronic kidney disease (HCC) 2023     Depressed mood 2023     Chronic pancreatitis, unspecified pancreatitis type ([facility]) 2023     Pulmonary embolus (HCC) 2022     Right leg pain 10/13/2022     Chronic cough 2022     Renal calculus, left 2022     Pulmonary nodules 2022     Rib pain 2022     Gastroparesis 2022     Ulcerative pancolitis without complication ([facility]) 2022     Neuropathic pain of right foot 01/10/2022     Weakness of right lower extremity 2021     Pulmonary hypertension (HCC) 2021     Cystic fibrosis ([facility]) 2021     Essential hypertension 2021     Hypercholesteremia 2021     Long term current use of bisphosphonates 2020     Long term current use of systemic steroids 2020     Osteopenia 2020     Chronic pain disorder 2020     Asthma 2020     Nausea and vomiting 2020     Hiatal hernia 2019     Anemia 2018     DVT (deep venous thrombosis) ([facility])  06/29/2018     Immunosuppression (HCC) 03/12/2017     Colon polyp, personal history 07/18/2016     S/P pneumonectomy 03/07/2013     Anxiety 07/08/2012     Type 1 diabetes mellitus with diabetic chronic kidney disease, unspecified CKD stage ([facility]) 10/28/2010     Pancreatic insufficiency 10/28/2010     Chronic sinusitis 10/28/2010     GERD (gastroesophageal reflux disease) 10/28/2010       LOS (days): 1  Geometric Mean LOS (GMLOS) (days): 3.2  Days to GMLOS:2.6     OBJECTIVE:  PATIENT READMITTED TO HOSPITAL  Risk of Unplanned Readmission Score: 34.81         Current admission status: Inpatient       Preferred Pharmacy:   [facility] - [place], [place] - [address] [facility]  [address]  [address]  [place] [address]  Phone: [phone] Fax: [phone]    [id number] [place] - [address]  [address]  [place] [address]  Phone: [phone] Fax: [phone]    Primary Care Provider: Solis Avila MD    Primary Insurance: BLUE CROSS  Secondary Insurance: MEDICARE    ASSESSMENT:  Active Health Care Proxies       ADELA KAUR Health Care Representative - Spouse   Primary Phone: [phone] (Mobile)                 Advance Directives  Does patient have a Health Care POA?: Yes  Does patient have Advance Directives?: Yes  Advance Directives: Power of  for health care  Primary Contact: ADELA KAUR   Health Care Representative - Spouse  Primary Phone: [phone]         Readmission Root Cause  30 Day Readmission: No    Patient Information  Admitted from:: Home  Mental Status: Alert  During Assessment patient was accompanied by: Not accompanied during assessment  Assessment information provided by:: Patient  Primary Caregiver: Self  Support Systems: Spouse/significant other  County of Residence: [place]  What city do you live in?: [place]  Home entry access options. Select all that apply.: No steps to enter home  Type of Current Residence: 2 story home  Upon entering residence,  is there a bedroom on the main floor (no further steps)?: Yes  Upon entering residence, is there a bathroom on the main floor (no further steps)?: Yes  Living Arrangements: Lives w/ Spouse/significant other  Is patient a ?: No    Activities of Daily Living Prior to Admission  Functional Status: Independent  Completes ADLs independently?: Yes  Ambulates independently?: Yes  Does patient use assisted devices?: No  Does patient currently own DME?: No  Does patient have a history of Outpatient Therapy (PT/OT)?: No  Does the patient have a history of Short-Term Rehab?: No  Does patient have a history of HHC?: No  Does patient currently have HHC?: No         Patient Information Continued  Income Source: Pension/nursing home  Does patient have prescription coverage?: Yes  Does patient receive dialysis treatments?: No  Does patient have a history of substance abuse?: No  Does patient have a history of Mental Health Diagnosis?: No         Means of Transportation  Means of Transport to Appts:: Family transport          DISCHARGE DETAILS:    Discharge planning discussed with:: Pt  Freedom of Choice: Yes  Comments - Freedom of Choice: CM spoke with pt and introduced self/role. Pt is alert and orietned x3 able to make her needs known and encouraged to do so. FOC discussed at length. Pt has an AMPAC of 23. CM continues to follow and assist with pt dc plans. [facility] referral made if pt decides that she wants HHC services.  CM contacted family/caregiver?: No- see comments (Pt will update her )  Were Treatment Team discharge recommendations reviewed with patient/caregiver?: Yes  Did patient/caregiver verbalize understanding of patient care needs?: Yes  Were patient/caregiver advised of the risks associated with not following Treatment Team discharge recommendations?: Yes    Contacts  Reason/Outcome: Continuity of Care, Emergency Contact, Referral, Discharge Planning              Other Referral/Resources/Interventions  Provided:  Interventions: None Indicated    Would you like to participate in our [facility] service program?  : No - Declined    Treatment Team Recommendation: Home  Discharge Destination Plan:: Home  Transport at Discharge : Family

## 2024-12-18 NOTE — UTILIZATION REVIEW
Initial Clinical Review    Admission: Date/Time/Statement:   Admission Orders (From admission, onward)       Ordered        12/17/24 2308  INPATIENT ADMISSION  Once                          Orders Placed This Encounter   Procedures    INPATIENT ADMISSION     Standing Status:   Standing     Number of Occurrences:   1     Level of Care:   Med Surg [16]     Estimated length of stay:   More than 2 Midnights     Certification:   I certify that inpatient services are medically necessary for this patient for a duration of greater than two midnights. See H&P and MD Progress Notes for additional information about the patient's course of treatment.     ED Arrival Information       Expected   -    Arrival   12/17/2024 17:43    Acuity   Emergent              Means of arrival   Walk-In    Escorted by   Family Member    Service   Hospitalist    Admission type   Emergency              Arrival complaint   Flank Pain(Transplant  PT)             Chief Complaint   Patient presents with    Flank Pain     L sided flank pain started hours ago, scheduled for scope on 12/26. Hx of lung transplant, denies fevers.          Initial Presentation: 49 y.o. female to ED from home w/ PMHX type 1 diabetes, cystic fibrosis, lung transplant on tacrolimus initially presented with flank pain. Patient reports ongoing flank pain that began earlier this afternoon with associated nausea. Was scheduled for cystoscopy and stent removal on 12/26. UA revealed moderate leukocytes, WBC and occasional bacteria . CT scan revealed migrated stent in the distal ureter. Noted 1 to 2 mm residual stone with no hydro . Admitted IP status w/ flank pain . Plan for NPO for tentative stent removal tomorrow , ceftriaxone , f/u ua cx , urology consult . DM SSI and monitor . GERD PPI and pepcid . Cystic fibrosis tacrolimus / prednisone . PE eliquis . CKD Cr 1.1 , baseline 0.7-1 , monitor kidney function . Hx transplant lung on tacrolimus /prednisone , cont .     Anticipated  Length of Stay/Certification Statement: Patient will be admitted on an inpatient basis with an anticipated length of stay of greater than 2 midnights secondary to intractable flank pain.     Date: 12/18   Day 2: Urology following, plan for stent removal today. Possible OR vs bedside removal. Continue Ceftriaxone . Follow-up urine culture.  Reports ongoing flank pain .Anticipate discharge later today or tomorrow to home.     12/18 Urology Consult   with stent migration unclear why/ how after being in correct place for 3 months. There are punctate renal stones and no obvious ureteral stone seen. plan for stent removal rather than considering second stage ureteroscopy as was originally planned. Equipment not available at bedside will need to go to OR . Keep NPO . + abd tenderness and L CVA tenderness .     ED Treatment-Medication Administration from 12/17/2024 1743 to 12/18/2024 0343         Date/Time Order Dose Route Action     12/17/2024 1944 HYDROmorphone (DILAUDID) injection 1 mg 1 mg Intravenous Given     12/17/2024 1943 lactated ringers bolus 1,000 mL 1,000 mL Intravenous New Bag     12/17/2024 2242 HYDROmorphone (DILAUDID) injection 1 mg 1 mg Intravenous Given     12/17/2024 2350 ceftriaxone (ROCEPHIN) 1 g/50 mL in dextrose IVPB 1,000 mg Intravenous New Bag     12/17/2024 2357 sodium chloride 0.9 % infusion 75 mL/hr Intravenous New Bag     12/18/2024 0054 insulin lispro (HumALOG/ADMELOG) 100 units/mL subcutaneous injection 1-5 Units 1 Units Subcutaneous Given     12/17/2024 2353 oxyCODONE (ROXICODONE) immediate release tablet 10 mg 10 mg Oral Given            Scheduled Medications:  cefTRIAXone, 1,000 mg, Intravenous, Q24H  insulin lispro, 1-5 Units, Subcutaneous, Q6H DOTTY      Continuous IV Infusions:  sodium chloride, 75 mL/hr, Intravenous, Continuous      PRN Meds:  acetaminophen, 650 mg, Oral, Q6H PRN  HYDROmorphone, 1 mg, Intravenous, Q4H PRN  12/18 x2  ondansetron, 4 mg, Intravenous, Q6H PRN  oxyCODONE, 10  mg, Oral, Q4H PRN      ED Triage Vitals   Temperature Pulse Respirations Blood Pressure SpO2 Pain Score   12/17/24 1745 12/17/24 1745 12/17/24 1745 12/17/24 1745 12/17/24 1745 12/17/24 1842   98.2 °F (36.8 °C) 90 19 165/88 93 % 8     Weight (last 2 days)       Date/Time Weight    12/18/24 0352 53.9 (118.83)    12/18/24 03:48:56 56.4 (124.34)            Vital Signs (last 3 days)       Date/Time Temp Pulse Resp BP MAP (mmHg) SpO2 O2 Device Patient Position - Orthostatic VS Pain    12/18/24 1100 -- -- -- -- -- -- -- -- 7    12/18/24 0845 -- -- -- -- -- -- None (Room air) -- 10 - Worst Possible Pain    12/18/24 07:19:42 99.1 °F (37.3 °C) 75 18 138/71 93 94 % -- -- --    12/18/24 0352 -- -- -- -- -- -- -- -- 10 - Worst Possible Pain    12/18/24 03:48:56 98.1 °F (36.7 °C) 89 22 184/81 115 95 % None (Room air) Lying --    12/18/24 0329 97.7 °F (36.5 °C) -- -- -- -- -- -- -- --    12/18/24 0312 -- 78 16 124/77 96 93 % None (Room air) Lying 2    12/18/24 0031 -- 72 16 130/78 100 92 % None (Room air) Lying 2    12/17/24 2353 -- -- -- -- -- -- -- -- 7    12/17/24 2314 -- 78 -- 138/81 103 94 % -- -- --    12/17/24 2246 -- 87 22 158/75 108 95 % None (Room air) Lying --    12/17/24 2242 -- -- -- -- -- -- -- -- 10 - Worst Possible Pain    12/17/24 1944 -- -- -- -- -- -- -- -- 8    12/17/24 1842 -- 82 16 191/118 148 96 % None (Room air) Sitting 8    12/17/24 1745 98.2 °F (36.8 °C) 90 19 165/88 118 93 % -- Sitting --              Pertinent Labs/Diagnostic Test Results:   Radiology:  CT renal stone study abdomen pelvis without contrast   Final Interpretation by Yovani Zamorano MD (12/17 2120)      1.  Malpositioned the left ureteral stent with its pigtail in the distal ureter and the remainder of the stent coiled within the bladder. No significant hydronephrosis.   2.  1 to 2 mm nonobstructive calculus in the left kidney.   3.  Moderate thickening of the distal esophagus which may be due to esophagitis. The stomach is distended with  fluid.   4.  Other findings as above.      Workstation performed: [id number]           Cardiology:  No orders to display     GI:  No orders to display           Results from last 7 days   Lab Units 12/18/24  0518 12/17/24 1932 12/16/24  1218   WBC Thousand/uL 7.79 9.20 11.32*   HEMOGLOBIN g/dL 9.0* 9.7* 10.9*   HEMATOCRIT % 31.0* 33.8* 38.3   PLATELETS Thousands/uL 139* 171 183   TOTAL NEUT ABS Thousands/µL 5.33 7.13 9.91*       Results from last 7 days   Lab Units 12/18/24  0518 12/17/24 1932 12/16/24  1218   SODIUM mmol/L 137 136 138   POTASSIUM mmol/L 4.5 4.6 4.5   CHLORIDE mmol/L 103 102 103   CO2 mmol/L 31 30 26   ANION GAP mmol/L 3* 4 9   BUN mg/dL 26* 29* 30*   CREATININE mg/dL 1.03 1.12 1.14   EGFR ml/min/1.73sq m 64 59 57   CALCIUM mg/dL 8.7 8.7 9.3       Results from last 7 days   Lab Units 12/18/24  0522 12/18/24  0049   POC GLUCOSE mg/dl 168* 203*     Results from last 7 days   Lab Units 12/18/24  0518 12/17/24  1932   GLUCOSE RANDOM mg/dL 189* 129         Results from last 7 days   Lab Units 12/16/24  1218   HEMOGLOBIN A1C % 7.8*   EAG mg/dl 177     Results from last 7 days   Lab Units 12/18/24  0518   PROTIME seconds 14.5   INR  1.06       Results from last 7 days   Lab Units 12/17/24 1948   CLARITY UA  Clear   COLOR UA  Colorless   SPEC GRAV UA  1.012   PH UA  7.5   GLUCOSE UA mg/dl Negative   KETONES UA mg/dl Negative   BLOOD UA  Negative   PROTEIN UA mg/dl Negative   NITRITE UA  Negative   BILIRUBIN UA  Negative   UROBILINOGEN UA (BE) mg/dl <2.0   LEUKOCYTES UA  Moderate*   WBC UA /hpf 20-30*   RBC UA /hpf 1-2   BACTERIA UA /hpf Occasional   EPITHELIAL CELLS WET PREP /hpf Occasional       Results from last 7 days   Lab Units 12/17/24 2328 12/17/24 2322 12/16/24  1218   BLOOD CULTURE  Received in Microbiology Lab. Culture in Progress. Received in Microbiology Lab. Culture in Progress.  --    URINE CULTURE   --   --  20,000-29,000 cfu/ml Beta Hemolytic Streptococcus Group B*  >100,000 cfu/ml        Past Medical History:   Diagnosis Date    Abnormal uterine bleeding (AUB) 7/7/2022    ABPA (allergic bronchopulmonary aspergillosis) ([facility]) 07/08/2012    Acute kidney injury (HCC) 09/08/2017    Acute on chronic respiratory failure (HCC) 03/04/2017    Last Assessment & Plan:  Formatting of this note might be different from the original. Recurrent with clinical worsening with enlarging PTX, s/p multiple chest tubes, but new fevers 6/19 and worsening leukocytosis indicating infection as cause of 6/19 decompensation with profound hypoxia in setting of shunting through right lung and continued pneumothorax s/p 2nd chest tube placement.  6/27: Right    Anaphylaxis 06/06/2022    Anemia     Asthma     Chest pain 09/21/2021    Chronic pain     Chronic respiratory failure with hypoxia ([facility]) 09/07/2021    COVID-19 06/03/2022    Cystic fibrosis ([facility])     Deep vein thrombosis ([facility])     Diabetes mellitus ([facility])     Fever 09/22/2024    GERD (gastroesophageal reflux disease)     HBP (high blood pressure)     HCAP (healthcare-associated pneumonia) 03/06/2020    History of transfusion     Influenza B 02/23/2020    Kidney stone     Moderate protein-calorie malnutrition (HCC) 01/08/2022    Neutropenic fever  ([facility]) 09/26/2020    Pneumonia due to Pseudomonas species ([facility]) 10/28/2010    Last Assessment & Plan:  Formatting of this note might be different from the original. Polymicrobial pna w/ pseudomonas, M. Abscessus, Cadida glabrata/albicans    PONV (postoperative nausea and vomiting)     Pseudomonas aeruginosa infection 09/20/2012    Renal calculus     Squamous cell skin cancer 01/12/2023    SCCIS- Left upper thigh    Transplant recipient     Lung- 2013, 2017    Ulcerative colitis ([facility])     Wellness examination 02/04/2013    Formatting of this note might be different from the original. Discussed with patient and acknowledgement form signed on 2/4/2013 (ALYSHA)     Present on Admission:   Type 1 diabetes mellitus with diabetic chronic  kidney disease, unspecified CKD stage (HCC)   Pulmonary embolus (HCC)   Cystic fibrosis (HCC)   GERD (gastroesophageal reflux disease)   Stage 3a chronic kidney disease (HCC)      Admitting Diagnosis: Flank pain [R10.9]  Left flank pain [R10.9]  Immunosuppressed status (HCC) [D84.9]  Displacement of ureteral stent, initial encounter ([facility]) [T83.122A]  Age/Sex: 49 y.o. female    Network Utilization Review Department  ATTENTION: Please call with any questions or concerns to [phone] and carefully listen to the prompts so that you are directed to the right person. All voicemails are confidential.   For Discharge needs, contact Care Management DC Support Team at [phone] opt. 2  Send all requests for admission clinical reviews, approved or denied determinations and any other requests to dedicated fax number below belonging to the campus where the patient is receiving treatment. List of dedicated fax numbers for the Facilities:  FACILITY NAME UR FAX NUMBER   ADMISSION DENIALS (Administrative/Medical Necessity) [phone]   DISCHARGE SUPPORT TEAM (NETWORK) [phone]   PARENT CHILD HEALTH (Maternity/NICU/Pediatrics) [phone]   [facility] [phone]   [facility] [phone]   [facility] [phone]   [facility] [phone]   [facility] [phone]   [facility] [phone]   [facility] [phone]   [facility] [phone]   [facility] [phone]   [facility] [phone]   [facility] [phone]   [facility] [phone]

## 2024-12-18 NOTE — CONSULTS
Consultation - Urology   Name: Maximilian Mann 49 y.o. female I MRN: [id number]  Unit/Bed#: -01 I Date of Admission: 12/17/2024   Date of Service: 12/18/2024 I Hospital Day: 1   Inpatient consult to Urology  Consult performed by: Michelle Zhang PA-C  Consult ordered by: Yanique Funk MD        Physician Requesting Evaluation: Gabriel Littlejohn DO   Reason for Evaluation / Principal Problem: flank pain    Assessment & Plan  Ureteral stent displacement (HCC)  Ureteral stent displacement/malpositioned  CT pigtail in distal left ureter remained urine the urinary bladder, no significant hydronephrosis  This is causing patient with significant pain this a.m.  She has small 1 to 2 mm calculus in the left kidney, likely to pass.  Unable to facilitate discharge  Discussed with patient ureteral stent removal.  Possible to do this at bedside however need a stent grasper, and bedside cystoscopy.  Added on for OR for possible removal today.  Keep patient n.p.o. for now  Consent signed  Flank pain  Due to malpositioned ureteral stent  Cystic fibrosis (HCC)  Follows with Reyna        Subjective:   HPI: Erin is a 49-year-old female past medical history type 1 diabetes, cystic fibrosis, lung transplant on tacrolimus who follows with YAYA Mcdonnell presented to the ED with flank pain.  Patient is known to our practice underwent ureteroscopy of the left renal calculus on 9/20.  There is poor visualization during her case with concern for residual stone burden.  Her follow-up CT scan does show small residual stone burden and she was scheduled on 1012/26 for repeat ureteroscopy.  Patient reports acute onset of left-sided flank pain, dysuria yesterday prompting ED visit.  In the ED she underwent a CT scan which showed malposition of ureteral stent.  She is admitted for antibiotics, pain control.  This morning she reports uncontrolled pain.  We discussed is possible to do bedside removal however need a  Health Maintenance Due   Topic Date Due   • Pneumococcal Vaccine 0-64 (1 of 1 - PPSV23) 03/02/1983   • Depression Screening  03/02/1989   • Influenza Vaccine (1) 09/01/2019       Patient is due for topics as listed above but is not proceeding with Immunization(s) Influenza and Pneumococcal at this time. Education provided for above.                             "stent grasper and cystoscopy versus OR for stent removal in the operating room.  We will keep patient n.p.o. for now.    Review of Systems   Constitutional:  Negative for chills and fever.   Respiratory:  Negative for cough and shortness of breath.    Cardiovascular:  Negative for chest pain and palpitations.   Gastrointestinal:  Positive for abdominal pain. Negative for vomiting.   Genitourinary:  Positive for flank pain. Negative for dysuria and hematuria.   Musculoskeletal:  Negative for arthralgias and back pain.   Skin:  Negative for color change and rash.   Neurological:  Negative for seizures and syncope.   All other systems reviewed and are negative.      Objective:    Vitals: Blood pressure 138/71, pulse 75, temperature 99.1 °F (37.3 °C), resp. rate 18, height 5' 2.5\" (1.588 m), weight 53.9 kg (118 lb 13.3 oz), last menstrual period 07/15/2024, SpO2 94%, not currently breastfeeding.,Body mass index is 21.39 kg/m².    Physical Exam  Vitals reviewed.   Constitutional:       General: She is not in acute distress.     Appearance: She is normal weight. She is not ill-appearing, toxic-appearing or diaphoretic.   HENT:      Head: Normocephalic and atraumatic.      Right Ear: External ear normal.      Left Ear: External ear normal.      Nose: Nose normal.      Mouth/Throat:      Mouth: Mucous membranes are moist.   Eyes:      General: No scleral icterus.     Conjunctiva/sclera: Conjunctivae normal.   Cardiovascular:      Rate and Rhythm: Normal rate and regular rhythm.      Pulses: Normal pulses.      Heart sounds: Normal heart sounds. No murmur heard.     No friction rub. No gallop.   Pulmonary:      Effort: Pulmonary effort is normal.   Abdominal:      General: There is no distension.      Tenderness: There is abdominal tenderness. There is left CVA tenderness. There is no guarding.   Neurological:      General: No focal deficit present.      Mental Status: Mental status is at baseline.   Psychiatric:         " Mood and Affect: Mood normal.         Behavior: Behavior normal.         Thought Content: Thought content normal.         Judgment: Judgment normal.             Labs:  Recent Labs     12/16/24  1218 12/17/24  1932 12/18/24  0518   WBC 11.32* 9.20 7.79       Recent Labs     12/16/24  1218 12/17/24  1932 12/18/24  0518   HGB 10.9* 9.7* 9.0*     Recent Labs     12/16/24  1218 12/17/24  1932 12/18/24  0518   HCT 38.3 33.8* 31.0*     Recent Labs     12/16/24  1218 12/17/24  1932 12/18/24  0518   CREATININE 1.14 1.12 1.03         History:    Past Medical History:   Diagnosis Date    Abnormal uterine bleeding (AUB) 7/7/2022    ABPA (allergic bronchopulmonary aspergillosis) ([facility]) 07/08/2012    Acute kidney injury ([facility]) 09/08/2017    Acute on chronic respiratory failure ([facility]) 03/04/2017    Last Assessment & Plan:  Formatting of this note might be different from the original. Recurrent with clinical worsening with enlarging PTX, s/p multiple chest tubes, but new fevers 6/19 and worsening leukocytosis indicating infection as cause of 6/19 decompensation with profound hypoxia in setting of shunting through right lung and continued pneumothorax s/p 2nd chest tube placement.  6/27: Right    Anaphylaxis 06/06/2022    Anemia     Asthma     Chest pain 09/21/2021    Chronic pain     Chronic respiratory failure with hypoxia ([facility]) 09/07/2021    COVID-19 06/03/2022    Cystic fibrosis ([facility])     Deep vein thrombosis ([facility])     Diabetes mellitus ([facility])     Fever 09/22/2024    GERD (gastroesophageal reflux disease)     HBP (high blood pressure)     HCAP (healthcare-associated pneumonia) 03/06/2020    History of transfusion     Influenza B 02/23/2020    Kidney stone     Moderate protein-calorie malnutrition (HCC) 01/08/2022    Neutropenic fever  ([facility]) 09/26/2020    Pneumonia due to Pseudomonas species ([facility]) 10/28/2010    Last Assessment & Plan:  Formatting of this note might be different from the original. Polymicrobial pna w/ pseudomonas, M.  Abscessus, Cadida glabrata/albicans    PONV (postoperative nausea and vomiting)     Pseudomonas aeruginosa infection 2012    Renal calculus     Squamous cell skin cancer 2023    SCCIS- Left upper thigh    Transplant recipient     Lung- 2017    Ulcerative colitis (HCC)     Wellness examination 2013    Formatting of this note might be different from the original. Discussed with patient and acknowledgement form signed on 2013 (ALYSHA)     Social History     Socioeconomic History    Marital status: /Civil Union     Spouse name: Not on file    Number of children: Not on file    Years of education: 12    Highest education level: Not on file   Occupational History    Occupation: Disabled    Tobacco Use    Smoking status: Former     Current packs/day: 0.00     Average packs/day: 0.5 packs/day for 12.0 years (6.0 ttl pk-yrs)     Types: Cigarettes     Start date: 1993     Quit date: 2005     Years since quittin.9     Passive exposure: Past    Smokeless tobacco: Never   Vaping Use    Vaping status: Never Used   Substance and Sexual Activity    Alcohol use: Yes     Alcohol/week: 2.0 standard drinks of alcohol     Types: 2 Glasses of wine per week     Comment: social    Drug use: Yes     Frequency: 2.0 times per week     Types: Marijuana     Comment: medical edible marijuana prescribed    Sexual activity: Yes     Partners: Male     Birth control/protection: None   Other Topics Concern    Not on file   Social History Narrative    Most recent tobacco use screenin2019    Do you currently or have you served in the [employer]: No    Were you activated, into active duty, as a member of the [employer] or as a Reservist: No    Occupation: disabled    Education: 12    Marital status:     Exercise level: Occasional    Diet: Specific    low sugar, high fat    General stress level: High    Alcohol intake: Occasional    Caffeine intake: Heavy    Chewing tobacco: none     Illicit drugs: none    Guns present in home: No    Seat belts used routinely: Yes    Smoke alarm in home: Yes    Advance directive: Yes     Social Drivers of Health     Financial Resource Strain: Low Risk  (11/10/2022)    [facility] from [facility], [facility]    Overall Financial Resource Strain (CARDIA)     Difficulty of Paying Living Expenses: Not hard at all   Food Insecurity: No Food Insecurity (12/18/2024)    Nursing - Inadequate Food Risk Classification     Worried About Running Out of Food in the Last Year: Never true     Ran Out of Food in the Last Year: Never true     Ran Out of Food in the Last Year: 1   Transportation Needs: No Transportation Needs (12/18/2024)    Nursing - Transportation Risk Classification     Lack of Transportation: Not on file     Lack of Transportation: 2   Physical Activity: Insufficiently Active (1/15/2024)    Exercise Vital Sign     Days of Exercise per Week: 7 days     Minutes of Exercise per Session: 20 min   Stress: No Stress Concern Present (1/15/2024)    [employer] [place] [employer] - Occupational Stress Questionnaire     Feeling of Stress : Only a little   Social Connections: Unknown (6/18/2024)    Received from [facility]    Social Azoi     How often do you feel lonely or isolated from those around you? (Adult - for ages 18 years and over): Not on file   Intimate Partner Violence: Unknown (12/18/2024)    Nursing IPS     Feels Physically and Emotionally Safe: Not on file     Physically Hurt by Someone: Not on file     Humiliated or Emotionally Abused by Someone: Not on file     Physically Hurt by Someone: 2     Hurt or Threatened by Someone: 2   Housing Stability: Unknown (12/18/2024)    Nursing: Inadequate Housing Risk Classification     Has Housing: Not on file     Worried About Losing Housing: Not on file     Unable to Get Utilities: Not on file     Unable to Pay for Housing in the Last Year: 2      Has Housin     Past Surgical History:   Procedure Laterality Date    APPENDECTOMY      AUGMENTATION MAMMAPLASTY Bilateral 2004    AUGMENTATION MAMMAPLASTY Right     removed     BREAST SURGERY      Aug.     BRONCHOSCOPY      CHOLECYSTECTOMY      COLONOSCOPY      EGD  2019    Yeast Infect.     FL RETROGRADE PYELOGRAM  2020    FL RETROGRADE PYELOGRAM  2020    FL RETROGRADE PYELOGRAM  2024    GASTROSCOPY  2024    GASTROSTOMY TUBE, PLACE      IR BALLOON-OCCLUDED ANTEGRADE TRANSVENOUS OBLITERATION (BATO)  2022    IR PORT PLACEMENT  2022    IR PORT REMOVAL  01/10/2022    IR PORT STRIPPING  2022    IR TUNNELED CENTRAL LINE PLACEMENT  2022    LUNG SURGERY      LUNG TRANSPLANT      X2    HI CYSTO BLADDER W/URETERAL CATHETERIZATION Right 2020    Procedure: CYSTOSCOPY RETROGRADE PYELOGRAM WITH INSERTION STENT URETERAL;  Surgeon: Patricio Perry MD;  Location: [facility];  Service: Urology    HI CYSTO/URETERO W/LITHOTRIPSY &INDWELL STENT INSRT Right 2020    Procedure: CYSTOSCOPY URETEROSCOPY WITH LITHOTRIPSY HOLMIUM LASER, RETROGRADE PYELOGRAM AND INSERTION STENT URETERAL;  Surgeon: Patricio Perry MD;  Location: [facility];  Service: Urology    HI CYSTO/URETERO W/LITHOTRIPSY &INDWELL STENT INSRT Left 2024    Procedure: CYSTOSCOPY URETEROSCOPY WITH LITHOTRIPSY HOLMIUM LASER, RETROGRADE PYELOGRAM AND INSERTION STENT URETERAL, BASKET STONE EXTRACTION;  Surgeon: Lee Briceno MD;  Location: [facility];  Service: Urology    SKIN BIOPSY      US GUIDED VASCULAR ACCESS  2017     Family History   Problem Relation Age of Onset    Alcohol abuse Mother     Mental illness Mother     Depression Mother     Alcohol abuse Father     COPD Maternal Grandmother     Lung cancer Maternal Grandmother 65    Cancer Maternal Grandmother     COPD Maternal Grandfather     Cancer Maternal Grandfather     COPD Paternal Grandmother     Lung cancer Paternal  Grandmother 65    No Known Problems Sister     No Known Problems Paternal Aunt     Breast cancer Neg Hx     Ovarian cancer Neg Hx     Colon cancer Neg Hx        Michelle Zhang PA-C  Date: 12/18/2024 Time: 9:59 AM

## 2024-12-18 NOTE — TREATMENT PLAN
Maximilian Mann is a 48 [facility].  Patient is scheduled for cysto, lithotripsy and stent insertion on 12/26.    CT A/P:  1.  Malpositioned the left ureteral stent with its pigtail in the distal ureter and the remainder of the stent coiled within the bladder. No significant hydronephrosis.  2.  1 to 2 mm nonobstructive calculus in the left kidney.    Labs and vital stable. Pre op culture yesterday contaminated.  Imaging reviewed by Dr. Briceno.      Plan:  Admit to [facility]  IV antibiotics  Pain control  Keep NPO  If patient still having pain in am, will attempt bedside stent removal vs OR  If pain controlled, likely can be discharged and arrange in office

## 2024-12-19 LAB
ANION GAP SERPL CALCULATED.3IONS-SCNC: 2 MMOL/L (ref 4–13)
BACTERIA UR CULT: NORMAL
BUN SERPL-MCNC: 19 MG/DL (ref 5–25)
CALCIUM SERPL-MCNC: 8 MG/DL (ref 8.4–10.2)
CHLORIDE SERPL-SCNC: 106 MMOL/L (ref 96–108)
CO2 SERPL-SCNC: 28 MMOL/L (ref 21–32)
CREAT SERPL-MCNC: 0.84 MG/DL (ref 0.6–1.3)
ERYTHROCYTE [DISTWIDTH] IN BLOOD BY AUTOMATED COUNT: 16.9 % (ref 11.6–15.1)
GFR SERPL CREATININE-BSD FRML MDRD: 83 ML/MIN/1.73SQ M
GLUCOSE SERPL-MCNC: 114 MG/DL (ref 65–140)
GLUCOSE SERPL-MCNC: 187 MG/DL (ref 65–140)
GLUCOSE SERPL-MCNC: 213 MG/DL (ref 65–140)
GLUCOSE SERPL-MCNC: 332 MG/DL (ref 65–140)
GLUCOSE SERPL-MCNC: 98 MG/DL (ref 65–140)
HCT VFR BLD AUTO: 31.1 % (ref 34.8–46.1)
HGB BLD-MCNC: 9.1 G/DL (ref 11.5–15.4)
MCH RBC QN AUTO: 24.9 PG (ref 26.8–34.3)
MCHC RBC AUTO-ENTMCNC: 29.3 G/DL (ref 31.4–37.4)
MCV RBC AUTO: 85 FL (ref 82–98)
PLATELET # BLD AUTO: 149 THOUSANDS/UL (ref 149–390)
PMV BLD AUTO: 10.3 FL (ref 8.9–12.7)
POTASSIUM SERPL-SCNC: 4.4 MMOL/L (ref 3.5–5.3)
RBC # BLD AUTO: 3.65 MILLION/UL (ref 3.81–5.12)
SODIUM SERPL-SCNC: 136 MMOL/L (ref 135–147)
WBC # BLD AUTO: 7.16 THOUSAND/UL (ref 4.31–10.16)

## 2024-12-19 PROCEDURE — 99232 SBSQ HOSP IP/OBS MODERATE 35: CPT | Performed by: UROLOGY

## 2024-12-19 PROCEDURE — 85027 COMPLETE CBC AUTOMATED: CPT

## 2024-12-19 PROCEDURE — 80048 BASIC METABOLIC PNL TOTAL CA: CPT

## 2024-12-19 PROCEDURE — 82948 REAGENT STRIP/BLOOD GLUCOSE: CPT

## 2024-12-19 PROCEDURE — 99232 SBSQ HOSP IP/OBS MODERATE 35: CPT

## 2024-12-19 RX ORDER — INSULIN LISPRO 100 [IU]/ML
1-5 INJECTION, SOLUTION INTRAVENOUS; SUBCUTANEOUS
Status: DISCONTINUED | OUTPATIENT
Start: 2024-12-19 | End: 2024-12-20 | Stop reason: HOSPADM

## 2024-12-19 RX ADMIN — HYDROMORPHONE HYDROCHLORIDE 1 MG: 1 INJECTION, SOLUTION INTRAMUSCULAR; INTRAVENOUS; SUBCUTANEOUS at 16:27

## 2024-12-19 RX ADMIN — HYDROMORPHONE HYDROCHLORIDE 1 MG: 1 INJECTION, SOLUTION INTRAMUSCULAR; INTRAVENOUS; SUBCUTANEOUS at 07:34

## 2024-12-19 RX ADMIN — PREGABALIN 150 MG: 75 CAPSULE ORAL at 17:07

## 2024-12-19 RX ADMIN — APIXABAN 5 MG: 5 TABLET, FILM COATED ORAL at 17:07

## 2024-12-19 RX ADMIN — SODIUM CHLORIDE 75 ML/HR: 0.9 INJECTION, SOLUTION INTRAVENOUS at 00:58

## 2024-12-19 RX ADMIN — PANTOPRAZOLE SODIUM 40 MG: 40 TABLET, DELAYED RELEASE ORAL at 05:26

## 2024-12-19 RX ADMIN — HYDROMORPHONE HYDROCHLORIDE 1 MG: 1 INJECTION, SOLUTION INTRAMUSCULAR; INTRAVENOUS; SUBCUTANEOUS at 02:54

## 2024-12-19 RX ADMIN — PREGABALIN 150 MG: 75 CAPSULE ORAL at 09:03

## 2024-12-19 RX ADMIN — INSULIN LISPRO 1 UNITS: 100 INJECTION, SOLUTION INTRAVENOUS; SUBCUTANEOUS at 11:52

## 2024-12-19 RX ADMIN — INSULIN LISPRO 1 UNITS: 100 INJECTION, SOLUTION INTRAVENOUS; SUBCUTANEOUS at 17:08

## 2024-12-19 RX ADMIN — OXYCODONE HYDROCHLORIDE 10 MG: 10 TABLET ORAL at 14:20

## 2024-12-19 RX ADMIN — INSULIN GLARGINE 12 UNITS: 100 INJECTION, SOLUTION SUBCUTANEOUS at 09:02

## 2024-12-19 RX ADMIN — PANCRELIPASE 48000 UNITS: 120000; 24000; 76000 CAPSULE, DELAYED RELEASE PELLETS ORAL at 11:53

## 2024-12-19 RX ADMIN — PANCRELIPASE 48000 UNITS: 120000; 24000; 76000 CAPSULE, DELAYED RELEASE PELLETS ORAL at 17:08

## 2024-12-19 RX ADMIN — BUDESONIDE AND FORMOTEROL FUMARATE DIHYDRATE 2 PUFF: 160; 4.5 AEROSOL RESPIRATORY (INHALATION) at 19:03

## 2024-12-19 RX ADMIN — OXYCODONE HYDROCHLORIDE 10 MG: 10 TABLET ORAL at 05:26

## 2024-12-19 RX ADMIN — PANCRELIPASE 48000 UNITS: 120000; 24000; 76000 CAPSULE, DELAYED RELEASE PELLETS ORAL at 07:40

## 2024-12-19 RX ADMIN — OXYCODONE HYDROCHLORIDE 10 MG: 10 TABLET ORAL at 23:33

## 2024-12-19 RX ADMIN — BUDESONIDE AND FORMOTEROL FUMARATE DIHYDRATE 2 PUFF: 160; 4.5 AEROSOL RESPIRATORY (INHALATION) at 07:39

## 2024-12-19 RX ADMIN — FAMOTIDINE 40 MG: 20 TABLET, FILM COATED ORAL at 21:39

## 2024-12-19 RX ADMIN — OXYCODONE HYDROCHLORIDE 10 MG: 10 TABLET ORAL at 10:27

## 2024-12-19 RX ADMIN — TACROLIMUS 2.5 MG: 0.5 CAPSULE ORAL at 09:03

## 2024-12-19 RX ADMIN — TACROLIMUS 2.5 MG: 0.5 CAPSULE ORAL at 17:07

## 2024-12-19 RX ADMIN — INSULIN LISPRO 3 UNITS: 100 INJECTION, SOLUTION INTRAVENOUS; SUBCUTANEOUS at 21:39

## 2024-12-19 RX ADMIN — APIXABAN 5 MG: 5 TABLET, FILM COATED ORAL at 09:03

## 2024-12-19 RX ADMIN — OXYCODONE HYDROCHLORIDE 10 MG: 10 TABLET ORAL at 18:43

## 2024-12-19 RX ADMIN — PREDNISONE 15 MG: 5 TABLET ORAL at 09:02

## 2024-12-19 RX ADMIN — OXYCODONE HYDROCHLORIDE 10 MG: 10 TABLET ORAL at 00:57

## 2024-12-19 RX ADMIN — OXYBUTYNIN CHLORIDE 15 MG: 5 TABLET, EXTENDED RELEASE ORAL at 21:39

## 2024-12-19 RX ADMIN — CEFTRIAXONE SODIUM 1000 MG: 10 INJECTION, POWDER, FOR SOLUTION INTRAVENOUS at 23:33

## 2024-12-19 RX ADMIN — VALACYCLOVIR HYDROCHLORIDE 500 MG: 500 TABLET, FILM COATED ORAL at 09:03

## 2024-12-19 RX ADMIN — ATOVAQUONE 1500 MG: 750 SUSPENSION ORAL at 09:04

## 2024-12-19 RX ADMIN — HYDROMORPHONE HYDROCHLORIDE 1 MG: 1 INJECTION, SOLUTION INTRAMUSCULAR; INTRAVENOUS; SUBCUTANEOUS at 11:49

## 2024-12-19 RX ADMIN — HYDROMORPHONE HYDROCHLORIDE 1 MG: 1 INJECTION, SOLUTION INTRAMUSCULAR; INTRAVENOUS; SUBCUTANEOUS at 20:31

## 2024-12-19 NOTE — PLAN OF CARE
Problem: PAIN - ADULT  Goal: Verbalizes/displays adequate comfort level or baseline comfort level  Description: Interventions:  - Encourage patient to monitor pain and request assistance  - Assess pain using appropriate pain scale  - Administer analgesics based on type and severity of pain and evaluate response  - Implement non-pharmacological measures as appropriate and evaluate response  - Consider cultural and social influences on pain and pain management  - Notify physician/advanced practitioner if interventions unsuccessful or patient reports new pain  Outcome: Progressing     Problem: INFECTION - ADULT  Goal: Absence or prevention of progression during hospitalization  Description: INTERVENTIONS:  - Assess and monitor for signs and symptoms of infection  - Monitor lab/diagnostic results  - Monitor all insertion sites, i.e. indwelling lines, tubes, and drains  - Monitor endotracheal if appropriate and nasal secretions for changes in amount and color  - [place] appropriate cooling/warming therapies per order  - Administer medications as ordered  - Instruct and encourage patient and family to use good hand hygiene technique  - Identify and instruct in appropriate isolation precautions for identified infection/condition  Outcome: Progressing  Goal: Absence of fever/infection during neutropenic period  Description: INTERVENTIONS:  - Monitor WBC    Outcome: Progressing     Problem: SAFETY ADULT  Goal: Patient will remain free of falls  Description: INTERVENTIONS:  - Educate patient/family on patient safety including physical limitations  - Instruct patient to call for assistance with activity   - Consult OT/PT to assist with strengthening/mobility   - Keep Call bell within reach  - Keep bed low and locked with side rails adjusted as appropriate  - Keep care items and personal belongings within reach  - Initiate and maintain comfort rounds  - Make Fall Risk Sign visible to staff  - patient ambulates  independently  - Apply yellow socks and bracelet for high fall risk patients  - Consider moving patient to room near nurses station  Outcome: Progressing     Problem: DISCHARGE PLANNING  Goal: Discharge to home or other facility with appropriate resources  Description: INTERVENTIONS:  - Identify barriers to discharge w/patient and caregiver  - Arrange for needed discharge resources and transportation as appropriate  - Identify discharge learning needs (meds, wound care, etc.)  - Arrange for interpretive services to assist at discharge as needed  - Refer to Case Management Department for coordinating discharge planning if the patient needs post-hospital services based on physician/advanced practitioner order or complex needs related to functional status, cognitive ability, or social support system  Outcome: Progressing     Problem: Knowledge Deficit  Goal: Patient/family/caregiver demonstrates understanding of disease process, treatment plan, medications, and discharge instructions  Description: Complete learning assessment and assess knowledge base.  Interventions:  - Provide teaching at level of understanding  - Provide teaching via preferred learning methods  Outcome: Progressing     Problem: GENITOURINARY - ADULT  Goal: Maintains or returns to baseline urinary function  Description: INTERVENTIONS:  - Assess urinary function  - Encourage oral fluids to ensure adequate hydration if ordered  - Administer IV fluids as ordered to ensure adequate hydration  - Administer ordered medications as needed  - Offer frequent toileting  - Follow urinary retention protocol if ordered  Outcome: Progressing  Goal: Absence of urinary retention  Description: INTERVENTIONS:  - Assess patient’s ability to void and empty bladder  - Monitor I/O  - Bladder scan as needed  - Discuss with physician/AP medications to alleviate retention as needed  - Discuss catheterization for long term situations as appropriate  Outcome: Progressing

## 2024-12-19 NOTE — CASE MANAGEMENT
Case Management Discharge Planning Note    Patient name Maximilian Mann  Location /-01 MRN [id number]  : 1975 Date 2024       Current Admission Date: 2024  Current Admission Diagnosis:Flank pain   Patient Active Problem List    Diagnosis Date Noted Date Diagnosed    Ureteral stent displacement ([facility]) 2024     Flank pain 2024     Thrombocytopenia ([facility]) 2024     Bacterial conjunctivitis 2024     Positive culture findings in sputum 2024     Chest pain 2024     Pancytopenia, acquired ([facility]) 10/24/2024     History of pulmonary embolus (PE) 2024     History of DVT (deep vein thrombosis) 2024     Sore throat 2024     Vaginal yeast infection 2024     Intestinal metaplasia of antrum of stomach without dysplasia 2024     Hx of lung transplant ([facility]) 01/15/2024     Continuous opioid dependence ([facility]) 01/15/2024     Stage 3a chronic kidney disease ([facility]) 2023     Depressed mood 2023     Chronic pancreatitis, unspecified pancreatitis type ([facility]) 2023     Pulmonary embolus ([facility]) 2022     Right leg pain 10/13/2022     Chronic cough 2022     Renal calculus, left 2022     Pulmonary nodules 2022     Rib pain 2022     Gastroparesis 2022     Ulcerative pancolitis without complication ([facility]) 2022     Neuropathic pain of right foot 01/10/2022     Weakness of right lower extremity 2021     Pulmonary hypertension (HCC) 2021     Cystic fibrosis ([facility]) 2021     Essential hypertension 2021     Hypercholesteremia 2021     Long term current use of bisphosphonates 2020     Long term current use of systemic steroids 2020     Osteopenia 2020     Chronic pain disorder 2020     Asthma 2020     Nausea and vomiting 2020     Hiatal hernia 2019     Anemia 2018     DVT (deep venous thrombosis) ([facility]) 2018      Immunosuppression (HCC) 03/12/2017     Colon polyp, personal history 07/18/2016     S/P pneumonectomy 03/07/2013     Anxiety 07/08/2012     Type 1 diabetes mellitus with diabetic chronic kidney disease, unspecified CKD stage (HCC) 10/28/2010     Pancreatic insufficiency 10/28/2010     Chronic sinusitis 10/28/2010     GERD (gastroesophageal reflux disease) 10/28/2010       LOS (days): 2  Geometric Mean LOS (GMLOS) (days): 3.2  Days to GMLOS:1.7     OBJECTIVE:  Risk of Unplanned Readmission Score: 40.78         Current admission status: Inpatient   Preferred Pharmacy:   [facility] - [place], [place] - [address] [place] [facility]  [address]  [place] [address]  Phone: [phone] Fax: [phone]    [id number] [place] - [address]  [address]  [place] [address]  Phone: [phone] Fax: [phone]    Primary Care Provider: Solis Avila MD    Primary Insurance: BLUE CROSS  Secondary Insurance: MEDICARE    DISCHARGE DETAILS:  As per KORY, pt is anticipated for dc today. [facility] 24, No CM needs.

## 2024-12-19 NOTE — PROGRESS NOTES
"Progress Note - Urology   Name: Maximilian Mann 49 y.o. female I MRN: [id number]  Unit/Bed#: [facility]-01 I Date of Admission: 12/17/2024   Date of Service: 12/19/2024 I Hospital Day: 2     Assessment & Plan  Ureteral stent displacement (HCC)  Ureteral stent displacement/malpositioned  CT pigtail in distal left ureter remained urine the urinary bladder, no significant hydronephrosis  Bedside cystoscopy stent removal yesterday by Dr. Briceno  Patient reports continued pain- similar to previous ureteral stent removal pain. No need for reimaging. Continue with pain control, NSAIDs and fluids.   Likely discharge in next 24 hours  Flank pain  Due to malpositioned ureteral stent  Cystic fibrosis (HCC)  Follows with U chandu Mcdonnell      Subjective:   HPI: Seen at bedside reports she is continuing to have left-sided pain.  She has some hematuria, to be expected.  She reports the last time she had a stent removal she had this similar pain that dissipated over the next 2 days.     Review of Systems   Constitutional:  Negative for chills and fever.   Respiratory:  Negative for cough and shortness of breath.    Cardiovascular:  Negative for chest pain and palpitations.   Gastrointestinal:  Positive for abdominal pain. Negative for vomiting.   Genitourinary:  Positive for flank pain and hematuria. Negative for dysuria.   Musculoskeletal:  Negative for arthralgias and back pain.   Skin:  Negative for color change and rash.   All other systems reviewed and are negative.      Objective:    Vitals: Blood pressure 143/84, pulse 77, temperature 99.2 °F (37.3 °C), resp. rate 20, height 5' 2.5\" (1.588 m), weight 53.9 kg (118 lb 13.3 oz), SpO2 94%, not currently breastfeeding.,Body mass index is 21.39 kg/m².    Physical Exam  Vitals reviewed.   Constitutional:       General: She is not in acute distress.     Appearance: Normal appearance. She is normal weight. She is not ill-appearing, toxic-appearing or diaphoretic.   HENT:      Head: " Normocephalic and atraumatic.      Right Ear: External ear normal.      Left Ear: External ear normal.      Nose: Nose normal.      Mouth/Throat:      Mouth: Mucous membranes are moist.   Eyes:      General: No scleral icterus.     Conjunctiva/sclera: Conjunctivae normal.   Cardiovascular:      Rate and Rhythm: Normal rate.      Pulses: Normal pulses.   Pulmonary:      Effort: Pulmonary effort is normal.   Abdominal:      General: There is no distension.      Tenderness: There is no abdominal tenderness. There is left CVA tenderness. There is no guarding.   Musculoskeletal:      Cervical back: Normal range of motion.   Neurological:      General: No focal deficit present.      Mental Status: She is alert and oriented to person, place, and time. Mental status is at baseline.   Psychiatric:         Mood and Affect: Mood normal.         Behavior: Behavior normal.         Thought Content: Thought content normal.         Judgment: Judgment normal.           Labs:  Recent Labs     12/16/24 1218 12/17/24 1932 12/18/24  0518 12/19/24  0527   WBC 11.32* 9.20 7.79 7.16       Recent Labs     12/16/24  1218 12/17/24  1932 12/18/24  0518 12/19/24  0527   HGB 10.9* 9.7* 9.0* 9.1*     Recent Labs     12/16/24 1218 12/17/24  1932 12/18/24  0518 12/19/24  0527   HCT 38.3 33.8* 31.0* 31.1*     Recent Labs     12/16/24 1218 12/17/24  1932 12/18/24  0518 12/19/24  0527   CREATININE 1.14 1.12 1.03 0.84         History:    Past Medical History:   Diagnosis Date    Abnormal uterine bleeding (AUB) 7/7/2022    ABPA (allergic bronchopulmonary aspergillosis) (HCC) 07/08/2012    Acute kidney injury (HCC) 09/08/2017    Acute on chronic respiratory failure (HCC) 03/04/2017    Last Assessment & Plan:  Formatting of this note might be different from the original. Recurrent with clinical worsening with enlarging PTX, s/p multiple chest tubes, but new fevers 6/19 and worsening leukocytosis indicating infection as cause of 6/19 decompensation  with profound hypoxia in setting of shunting through right lung and continued pneumothorax s/p 2nd chest tube placement.  : Right    Anaphylaxis 2022    Anemia     Asthma     Chest pain 2021    Chronic pain     Chronic respiratory failure with hypoxia (HCC) 2021    COVID-19 2022    Cystic fibrosis ([facility])     Deep vein thrombosis (HCC)     Diabetes mellitus ([facility])     Fever 2024    GERD (gastroesophageal reflux disease)     HBP (high blood pressure)     HCAP (healthcare-associated pneumonia) 2020    History of transfusion     Influenza B 2020    Kidney stone     Moderate protein-calorie malnutrition (HCC) 2022    Neutropenic fever  ([facility]) 2020    Pneumonia due to Pseudomonas species ([facility]) 10/28/2010    Last Assessment & Plan:  Formatting of this note might be different from the original. Polymicrobial pna w/ pseudomonas, M. Abscessus, Cadida glabrata/albicans    PONV (postoperative nausea and vomiting)     Pseudomonas aeruginosa infection 2012    Renal calculus     Squamous cell skin cancer 2023    SCCIS- Left upper thigh    Transplant recipient     Lung- ,     Ulcerative colitis ([facility])     Wellness examination 2013    Formatting of this note might be different from the original. Discussed with patient and acknowledgement form signed on 2013 (ALYSHA)     Social History     Socioeconomic History    Marital status: /Civil Union     Spouse name: Not on file    Number of children: Not on file    Years of education: 12    Highest education level: Not on file   Occupational History    Occupation: Disabled    Tobacco Use    Smoking status: Former     Current packs/day: 0.00     Average packs/day: 0.5 packs/day for 12.0 years (6.0 ttl pk-yrs)     Types: Cigarettes     Start date: 1993     Quit date: 2005     Years since quittin.9     Passive exposure: Past    Smokeless tobacco: Never   Vaping Use    Vaping status: Never Used    Substance and Sexual Activity    Alcohol use: Yes     Alcohol/week: 2.0 standard drinks of alcohol     Types: 2 Glasses of wine per week     Comment: social    Drug use: Yes     Frequency: 2.0 times per week     Types: Marijuana     Comment: medical edible marijuana prescribed    Sexual activity: Yes     Partners: Male     Birth control/protection: None   Other Topics Concern    Not on file   Social History Narrative    Most recent tobacco use screenin2019    Do you currently or have you served in the  [email or web address]: No    Were you activated, into active duty, as a member of the [employer] or as a Reservist: No    Occupation: disabled    Education: 12    Marital status:     Exercise level: Occasional    Diet: Specific    low sugar, high fat    General stress level: High    Alcohol intake: Occasional    Caffeine intake: Heavy    Chewing tobacco: none    Illicit drugs: none    Guns present in home: No    Seat belts used routinely: Yes    Smoke alarm in home: Yes    Advance directive: Yes     Social Drivers of Health     Financial Resource Strain: Low Risk  (11/10/2022)    [facility]    Overall Financial Resource Strain (CARDIA)     Difficulty of Paying Living Expenses: Not hard at all   Food Insecurity: No Food Insecurity (2024)    Nursing - Inadequate Food Risk Classification     Worried About Running Out of Food in the Last Year: Never true     Ran Out of Food in the Last Year: Never true     Ran Out of Food in the Last Year: 1   Transportation Needs: No Transportation Needs (2024)    Nursing - Transportation Risk Classification     Lack of Transportation: Not on file     Lack of Transportation: 2   Physical Activity: Insufficiently Active (1/15/2024)    Exercise Vital Sign     Days of Exercise per Week: 7 days     Minutes of Exercise per Session: 20 min   Stress: No Stress Concern Present  (1/15/2024)    [place] [employer] - Occupational Stress Questionnaire     Feeling of Stress : Only a little   Social Connections: Unknown (2024)    Received from [employer]    Social [employer]     How often do you feel lonely or isolated from those around you? (Adult - for ages 18 years and over): Not on file   Intimate Partner Violence: Unknown (2024)    Nursing IPS     Feels Physically and Emotionally Safe: Not on file     Physically Hurt by Someone: Not on file     Humiliated or Emotionally Abused by Someone: Not on file     Physically Hurt by Someone: 2     Hurt or Threatened by Someone: 2   Housing Stability: Unknown (2024)    Nursing: Inadequate Housing Risk Classification     Has Housing: Not on file     Worried About Losing Housing: Not on file     Unable to Get Utilities: Not on file     Unable to Pay for Housing in the Last Year: 2     Has Housin     Past Surgical History:   Procedure Laterality Date    APPENDECTOMY      AUGMENTATION MAMMAPLASTY Bilateral 2004    AUGMENTATION MAMMAPLASTY Right     removed     BREAST SURGERY      Aug.     BRONCHOSCOPY      CHOLECYSTECTOMY      COLONOSCOPY      EGD  2019    Yeast Infect.     FL RETROGRADE PYELOGRAM  2020    FL RETROGRADE PYELOGRAM  2020    FL RETROGRADE PYELOGRAM  2024    GASTROSCOPY  2024    GASTROSTOMY TUBE, PLACE      IR BALLOON-OCCLUDED ANTEGRADE TRANSVENOUS OBLITERATION (BATO)  2022    IR PORT PLACEMENT  2022    IR PORT REMOVAL  01/10/2022    IR PORT STRIPPING  2022    IR TUNNELED CENTRAL LINE PLACEMENT  2022    LUNG SURGERY      LUNG TRANSPLANT      X2    MI CYSTO BLADDER W/URETERAL CATHETERIZATION Right 2020    Procedure: CYSTOSCOPY RETROGRADE PYELOGRAM WITH INSERTION STENT URETERAL;  Surgeon: Patricio Perry MD;  Location: [facility];  Service: Urology    MI CYSTO/URETERO W/LITHOTRIPSY &INDWELL STENT INSRT Right 2020    Procedure:  CYSTOSCOPY URETEROSCOPY WITH LITHOTRIPSY HOLMIUM LASER, RETROGRADE PYELOGRAM AND INSERTION STENT URETERAL;  Surgeon: Patricio Perry MD;  Location: [facility];  Service: Urology    SC CYSTO/URETERO W/LITHOTRIPSY &INDWELL STENT INSRT Left 9/20/2024    Procedure: CYSTOSCOPY URETEROSCOPY WITH LITHOTRIPSY HOLMIUM LASER, RETROGRADE PYELOGRAM AND INSERTION STENT URETERAL, BASKET STONE EXTRACTION;  Surgeon: Lee Briceno MD;  Location:  MAIN OR;  Service: Urology    SKIN BIOPSY      US GUIDED VASCULAR ACCESS  01/17/2017     Family History   Problem Relation Age of Onset    Alcohol abuse Mother     Mental illness Mother     Depression Mother     Alcohol abuse Father     COPD Maternal Grandmother     Lung cancer Maternal Grandmother 65    Cancer Maternal Grandmother     COPD Maternal Grandfather     Cancer Maternal Grandfather     COPD Paternal Grandmother     Lung cancer Paternal Grandmother 65    No Known Problems Sister     No Known Problems Paternal Aunt     Breast cancer Neg Hx     Ovarian cancer Neg Hx     Colon cancer Neg Hx        Michelle Zhang PA-C  Date: 12/19/2024 Time: 12:10 PM

## 2024-12-19 NOTE — ASSESSMENT & PLAN NOTE
Lab Results   Component Value Date    HGBA1C 7.8 (H) 12/16/2024   Continue home Lantus  Sliding scale insulin goal 140-180  POC glucose checks AC/HS  Carb controlled diet  Hypoglycemia protocol

## 2024-12-19 NOTE — PLAN OF CARE
Problem: INFECTION - ADULT  Goal: Absence or prevention of progression during hospitalization  Description: INTERVENTIONS:  - Assess and monitor for signs and symptoms of infection  - Monitor lab/diagnostic results  - Monitor all insertion sites, i.e. indwelling lines, tubes, and drains  - Monitor endotracheal if appropriate and nasal secretions for changes in amount and color  - [place] appropriate cooling/warming therapies per order  - Administer medications as ordered  - Instruct and encourage patient and family to use good hand hygiene technique  - Identify and instruct in appropriate isolation precautions for identified infection/condition  Outcome: Progressing     Problem: PAIN - ADULT  Goal: Verbalizes/displays adequate comfort level or baseline comfort level  Description: Interventions:  - Encourage patient to monitor pain and request assistance  - Assess pain using appropriate pain scale  - Administer analgesics based on type and severity of pain and evaluate response  - Implement non-pharmacological measures as appropriate and evaluate response  - Consider cultural and social influences on pain and pain management  - Notify physician/advanced practitioner if interventions unsuccessful or patient reports new pain  Outcome: Progressing     Problem: SAFETY ADULT  Goal: Patient will remain free of falls  Description: INTERVENTIONS:  - Educate patient/family on patient safety including physical limitations  - Instruct patient to call for assistance with activity   - Consult OT/PT to assist with strengthening/mobility   - Keep Call bell within reach  - Keep bed low and locked with side rails adjusted as appropriate  - Keep care items and personal belongings within reach  - Initiate and maintain comfort rounds  - Make Fall Risk Sign visible to staff  - patient ambulates independently  - Apply yellow socks and bracelet for high fall risk patients  - Consider moving patient to room near nurses station  Outcome:  Progressing     Problem: DISCHARGE PLANNING  Goal: Discharge to home or other facility with appropriate resources  Description: INTERVENTIONS:  - Identify barriers to discharge w/patient and caregiver  - Arrange for needed discharge resources and transportation as appropriate  - Identify discharge learning needs (meds, wound care, etc.)  - Arrange for interpretive services to assist at discharge as needed  - Refer to Case Management Department for coordinating discharge planning if the patient needs post-hospital services based on physician/advanced practitioner order or complex needs related to functional status, cognitive ability, or social support system  Outcome: Progressing     Problem: Knowledge Deficit  Goal: Patient/family/caregiver demonstrates understanding of disease process, treatment plan, medications, and discharge instructions  Description: Complete learning assessment and assess knowledge base.  Interventions:  - Provide teaching at level of understanding  - Provide teaching via preferred learning methods  Outcome: Progressing     Problem: GENITOURINARY - ADULT  Goal: Maintains or returns to baseline urinary function  Description: INTERVENTIONS:  - Assess urinary function  - Encourage oral fluids to ensure adequate hydration if ordered  - Administer IV fluids as ordered to ensure adequate hydration  - Administer ordered medications as needed  - Offer frequent toileting  - Follow urinary retention protocol if ordered  Outcome: Progressing

## 2024-12-19 NOTE — PROGRESS NOTES
Progress Note - Hospitalist   Name: Maximilian Mann 49 y.o. female I MRN: [id number]  Unit/Bed#: -01 I Date of Admission: 12/17/2024   Date of Service: 12/19/2024 I Hospital Day: 2    Assessment & Plan  Flank pain  48-year-old female with history of cystic fibrosis, type 1 diabetes with a history of kidney stone in September 2024 with stent placed.  Is scheduled for cystoscopy and stent removal on 12/26.  Presented with left flank pain with associated nausea.  CT scan revealed migrated stent in the distal ureter.  Noted 1 to 2 mm residual stone with no hydro  UA revealed moderate leukocytes, WBC and occasional bacteria  Urology following, S/P stent removal on 12/18  Patient reports on going flank pain discomfort, will monitor for additional day  No repeat imaging indicated at this time  Supportive care  Continue antibiotics x5 total of 5 days  Urine culture growing mixed contaminates     Type 1 diabetes mellitus with diabetic chronic kidney disease, unspecified CKD stage (HCC)    Lab Results   Component Value Date    HGBA1C 7.8 (H) 12/16/2024   Continue home Lantus  Sliding scale insulin goal 140-180  POC glucose checks AC/HS  Carb controlled diet  Hypoglycemia protocol        GERD (gastroesophageal reflux disease)  Continue PPI and Pepcid  Cystic fibrosis (HCC)  History of cystic fibrosis on tacrolimus/prednisone  Pulmonary embolus (HCC)  Continue Eliquis  Stage 3a chronic kidney disease (HCC)  Lab Results   Component Value Date    CREATININE 0.84 12/19/2024    CREATININE 1.03 12/18/2024    CREATININE 1.12 12/17/2024       Baseline creatinine approximately 0.7-1  Creatinine at baseline  Continue to monitor kidney function  Hx of lung transplant (HCC)  History of 2 previous lung transplants on tacrolimus/prednisone  Will continue  Ureteral stent displacement (HCC)      VTE Pharmacologic Prophylaxis: VTE Score: 6 High Risk (Score >/= 5) - Pharmacological DVT Prophylaxis Ordered: apixaban (Eliquis).  Sequential Compression Devices Ordered.    Mobility:   Basic Mobility Inpatient Raw Score: 23  JH-HLM Goal: 7: Walk 25 feet or more  JH-HLM Achieved: 7: Walk 25 feet or more  JH-HLM Goal achieved. Continue to encourage appropriate mobility.    Patient Centered Rounds: I performed bedside rounds with nursing staff today.   Discussions with Specialists or Other Care Team Provider:   Urology    Education and Discussions with Family / Patient: Patient declined call to .     Current Length of Stay: 2 day(s)  Current Patient Status: Inpatient   Certification Statement: The patient will continue to require additional inpatient hospital stay due to intractable flank pain  Discharge Plan: Anticipate discharge tomorrow to home.    Code Status: Level 1 - Full Code    Subjective   Patient seen and examined resting in bed. Reports some ongoing flank pain. Denies urinary symptoms.    Objective :  Temp:  [98.6 °F (37 °C)-99.2 °F (37.3 °C)] 99.2 °F (37.3 °C)  HR:  [77-86] 77  BP: (143-151)/(84-85) 143/84  Resp:  [20] 20  SpO2:  [93 %-99 %] 99 %  O2 Device: None (Room air)    Body mass index is 21.39 kg/m².     Input and Output Summary (last 24 hours):     Intake/Output Summary (Last 24 hours) at 12/19/2024 1522  Last data filed at 12/19/2024 1301  Gross per 24 hour   Intake 240 ml   Output 1770 ml   Net -1530 ml       Physical Exam  Vitals and nursing note reviewed.   Constitutional:       General: She is not in acute distress.     Appearance: Normal appearance.   HENT:      Head: Normocephalic.      Mouth/Throat:      Mouth: Mucous membranes are moist.   Eyes:      General: No scleral icterus.  Cardiovascular:      Rate and Rhythm: Normal rate and regular rhythm.      Pulses: Normal pulses.      Heart sounds: Normal heart sounds.   Pulmonary:      Effort: Pulmonary effort is normal. No respiratory distress.      Breath sounds: Normal breath sounds.   Abdominal:      General: There is no distension.      Tenderness:  There is no abdominal tenderness.   Musculoskeletal:         General: No swelling.   Skin:     General: Skin is warm.   Neurological:      Mental Status: She is alert and oriented to person, place, and time.   Psychiatric:         Mood and Affect: Mood normal.         Behavior: Behavior normal.         Thought Content: Thought content normal.           Lines/Drains:  Lines/Drains/Airways       Active Status       Name Placement date Placement time Site Days    Port A Cath 11/25/22 Right Chest 11/25/22  0953  Chest  755                    Central Line:  Goal for removal:  Long term port a cath               Lab Results: I have reviewed the following results:   Results from last 7 days   Lab Units 12/19/24  0527 12/18/24  0518   WBC Thousand/uL 7.16 7.79   HEMOGLOBIN g/dL 9.1* 9.0*   HEMATOCRIT % 31.1* 31.0*   PLATELETS Thousands/uL 149 139*   SEGS PCT %  --  68   LYMPHO PCT %  --  21   MONO PCT %  --  10   EOS PCT %  --  1     Results from last 7 days   Lab Units 12/19/24  0527   SODIUM mmol/L 136   POTASSIUM mmol/L 4.4   CHLORIDE mmol/L 106   CO2 mmol/L 28   BUN mg/dL 19   CREATININE mg/dL 0.84   ANION GAP mmol/L 2*   CALCIUM mg/dL 8.0*   GLUCOSE RANDOM mg/dL 114     Results from last 7 days   Lab Units 12/18/24  0518   INR  1.06     Results from last 7 days   Lab Units 12/19/24  1108 12/19/24  0647 12/18/24  2059 12/18/24  1240 12/18/24  0522 12/18/24  0049   POC GLUCOSE mg/dl 213* 98 188* 106 168* 203*     Results from last 7 days   Lab Units 12/16/24  1218   HEMOGLOBIN A1C % 7.8*           Recent Cultures (last 7 days):   Results from last 7 days   Lab Units 12/17/24  2328 12/17/24  2322 12/17/24  1948 12/16/24  1218   BLOOD CULTURE  No Growth at 24 hrs. No Growth at 24 hrs.  --   --    URINE CULTURE   --   --  30,000-39,000 cfu/ml 20,000-29,000 cfu/ml Beta Hemolytic Streptococcus Group B*  >100,000 cfu/ml       Imaging Results Review: No pertinent imaging studies reviewed.  Other Study Results Review: No  additional pertinent studies reviewed.    Last 24 Hours Medication List:     Current Facility-Administered Medications:     acetaminophen (TYLENOL) tablet 650 mg, Q6H PRN    albuterol (PROVENTIL HFA,VENTOLIN HFA) inhaler 1 puff, Q4H PRN    ALPRAZolam (XANAX) tablet 1 mg, HS PRN    apixaban (ELIQUIS) tablet 5 mg, BID    atovaquone (MEPRON) oral suspension 1,500 mg, Daily    azithromycin (ZITHROMAX) tablet 250 mg, Once per day on Monday Wednesday Friday    budesonide-formoterol (SYMBICORT) 160-4.5 mcg/act inhaler 2 puff, Q12H    ceftriaxone (ROCEPHIN) 1 g/50 mL in dextrose IVPB, Q24H, Last Rate: 1,000 mg (12/18/24 2231)    famotidine (PEPCID) tablet 40 mg, HS    HYDROmorphone (DILAUDID) injection 1 mg, Q4H PRN    insulin glargine (LANTUS) subcutaneous injection 12 Units 0.12 mL, QAM    insulin lispro (HumALOG/ADMELOG) 100 units/mL subcutaneous injection 1-5 Units, TID AC **AND** Fingerstick Glucose (POCT), TID AC    ondansetron (ZOFRAN) injection 4 mg, Q6H PRN    oxybutynin (DITROPAN-XL) 24 hr tablet 15 mg, HS    oxyCODONE (ROXICODONE) immediate release tablet 10 mg, Q4H PRN    pancrelipase (Lip-Prot-Amyl) (CREON) delayed release capsule 48,000 Units, TID With Meals    pantoprazole (PROTONIX) EC tablet 40 mg, Early Morning    predniSONE tablet 15 mg, Daily    pregabalin (LYRICA) capsule 150 mg, BID    tacrolimus (PROGRAF) capsule 2.5 mg, BID    valACYclovir (VALTREX) tablet 500 mg, Daily    Administrative Statements   Today, Patient Was Seen By: WAYNE Templeton  I have spent a total time of 32 minutes in caring for this patient on the day of the visit/encounter including Diagnostic results, Instructions for management, Patient and family education, Risk factor reductions, Counseling / Coordination of care, Documenting in the medical record, Reviewing / ordering tests, medicine, procedures  , Obtaining or reviewing history  , and Communicating with other healthcare professionals .    **Please Note: This  note may have been constructed using a voice recognition system.**

## 2024-12-19 NOTE — ASSESSMENT & PLAN NOTE
Ureteral stent displacement/malpositioned  CT pigtail in distal left ureter remained urine the urinary bladder, no significant hydronephrosis  Bedside cystoscopy stent removal yesterday by Dr. Briceno  Patient reports continued pain- similar to previous ureteral stent removal pain. No need for reimaging. Continue with pain control, NSAIDs and fluids.   Likely discharge in next 24 hours

## 2024-12-19 NOTE — ASSESSMENT & PLAN NOTE
48-year-old female with history of cystic fibrosis, type 1 diabetes with a history of kidney stone in September 2024 with stent placed.  Is scheduled for cystoscopy and stent removal on 12/26.  Presented with left flank pain with associated nausea.  CT scan revealed migrated stent in the distal ureter.  Noted 1 to 2 mm residual stone with no hydro  UA revealed moderate leukocytes, WBC and occasional bacteria  Urology following, S/P stent removal on 12/18  Patient reports on going flank pain discomfort, will monitor for additional day  No repeat imaging indicated at this time  Supportive care  Continue antibiotics x5 total of 5 days  Urine culture growing mixed contaminates

## 2024-12-19 NOTE — ASSESSMENT & PLAN NOTE
Lab Results   Component Value Date    CREATININE 0.84 12/19/2024    CREATININE 1.03 12/18/2024    CREATININE 1.12 12/17/2024       Baseline creatinine approximately 0.7-1  Creatinine at baseline  Continue to monitor kidney function

## 2024-12-20 VITALS
HEIGHT: 63 IN | OXYGEN SATURATION: 94 % | RESPIRATION RATE: 16 BRPM | WEIGHT: 118.83 LBS | HEART RATE: 79 BPM | BODY MASS INDEX: 21.05 KG/M2 | SYSTOLIC BLOOD PRESSURE: 134 MMHG | DIASTOLIC BLOOD PRESSURE: 65 MMHG | TEMPERATURE: 98.8 F

## 2024-12-20 LAB
ANION GAP SERPL CALCULATED.3IONS-SCNC: 4 MMOL/L (ref 4–13)
BASOPHILS # BLD AUTO: 0.01 THOUSANDS/ΜL (ref 0–0.1)
BASOPHILS NFR BLD AUTO: 0 % (ref 0–1)
BUN SERPL-MCNC: 27 MG/DL (ref 5–25)
CALCIUM SERPL-MCNC: 8.5 MG/DL (ref 8.4–10.2)
CHLORIDE SERPL-SCNC: 104 MMOL/L (ref 96–108)
CO2 SERPL-SCNC: 29 MMOL/L (ref 21–32)
CREAT SERPL-MCNC: 0.96 MG/DL (ref 0.6–1.3)
EOSINOPHIL # BLD AUTO: 0.13 THOUSAND/ΜL (ref 0–0.61)
EOSINOPHIL NFR BLD AUTO: 2 % (ref 0–6)
ERYTHROCYTE [DISTWIDTH] IN BLOOD BY AUTOMATED COUNT: 16.8 % (ref 11.6–15.1)
GFR SERPL CREATININE-BSD FRML MDRD: 70 ML/MIN/1.73SQ M
GLUCOSE SERPL-MCNC: 144 MG/DL (ref 65–140)
GLUCOSE SERPL-MCNC: 156 MG/DL (ref 65–140)
GLUCOSE SERPL-MCNC: 162 MG/DL (ref 65–140)
GLUCOSE SERPL-MCNC: 163 MG/DL (ref 65–140)
HCT VFR BLD AUTO: 29.9 % (ref 34.8–46.1)
HGB BLD-MCNC: 8.6 G/DL (ref 11.5–15.4)
IMM GRANULOCYTES # BLD AUTO: 0.03 THOUSAND/UL (ref 0–0.2)
IMM GRANULOCYTES NFR BLD AUTO: 0 % (ref 0–2)
LYMPHOCYTES # BLD AUTO: 1.66 THOUSANDS/ΜL (ref 0.6–4.47)
LYMPHOCYTES NFR BLD AUTO: 24 % (ref 14–44)
MCH RBC QN AUTO: 24.3 PG (ref 26.8–34.3)
MCHC RBC AUTO-ENTMCNC: 28.8 G/DL (ref 31.4–37.4)
MCV RBC AUTO: 85 FL (ref 82–98)
MONOCYTES # BLD AUTO: 0.75 THOUSAND/ΜL (ref 0.17–1.22)
MONOCYTES NFR BLD AUTO: 11 % (ref 4–12)
NEUTROPHILS # BLD AUTO: 4.33 THOUSANDS/ΜL (ref 1.85–7.62)
NEUTS SEG NFR BLD AUTO: 63 % (ref 43–75)
NRBC BLD AUTO-RTO: 0 /100 WBCS
PLATELET # BLD AUTO: 151 THOUSANDS/UL (ref 149–390)
PMV BLD AUTO: 10.1 FL (ref 8.9–12.7)
POTASSIUM SERPL-SCNC: 4.1 MMOL/L (ref 3.5–5.3)
RBC # BLD AUTO: 3.54 MILLION/UL (ref 3.81–5.12)
SODIUM SERPL-SCNC: 137 MMOL/L (ref 135–147)
WBC # BLD AUTO: 6.91 THOUSAND/UL (ref 4.31–10.16)

## 2024-12-20 PROCEDURE — 99232 SBSQ HOSP IP/OBS MODERATE 35: CPT

## 2024-12-20 PROCEDURE — 85025 COMPLETE CBC W/AUTO DIFF WBC: CPT | Performed by: INTERNAL MEDICINE

## 2024-12-20 PROCEDURE — 99239 HOSP IP/OBS DSCHRG MGMT >30: CPT | Performed by: NURSE PRACTITIONER

## 2024-12-20 PROCEDURE — 82948 REAGENT STRIP/BLOOD GLUCOSE: CPT

## 2024-12-20 PROCEDURE — 80048 BASIC METABOLIC PNL TOTAL CA: CPT | Performed by: INTERNAL MEDICINE

## 2024-12-20 RX ORDER — INSULIN LISPRO 100 [IU]/ML
5 INJECTION, SOLUTION INTRAVENOUS; SUBCUTANEOUS
Status: DISCONTINUED | OUTPATIENT
Start: 2024-12-20 | End: 2024-12-20 | Stop reason: HOSPADM

## 2024-12-20 RX ORDER — CEPHALEXIN 500 MG/1
500 CAPSULE ORAL EVERY 6 HOURS SCHEDULED
Qty: 12 CAPSULE | Refills: 0 | Status: SHIPPED | OUTPATIENT
Start: 2024-12-20 | End: 2024-12-23

## 2024-12-20 RX ORDER — HEPARIN 100 UNIT/ML
500 SYRINGE INTRAVENOUS ONCE
Status: COMPLETED | OUTPATIENT
Start: 2024-12-20 | End: 2024-12-20

## 2024-12-20 RX ORDER — HEPARIN SODIUM 5000 [USP'U]/ML
5000 INJECTION, SOLUTION INTRAVENOUS; SUBCUTANEOUS EVERY 8 HOURS SCHEDULED
Status: DISCONTINUED | OUTPATIENT
Start: 2024-12-20 | End: 2024-12-20

## 2024-12-20 RX ORDER — CEPHALEXIN 500 MG/1
500 CAPSULE ORAL EVERY 6 HOURS SCHEDULED
Qty: 12 CAPSULE | Refills: 0 | Status: SHIPPED | OUTPATIENT
Start: 2024-12-20 | End: 2024-12-20

## 2024-12-20 RX ORDER — HEPARIN SODIUM 5000 [USP'U]/ML
5000 INJECTION, SOLUTION INTRAVENOUS; SUBCUTANEOUS ONCE
Status: DISCONTINUED | OUTPATIENT
Start: 2024-12-20 | End: 2024-12-20 | Stop reason: DRUGHIGH

## 2024-12-20 RX ADMIN — INSULIN LISPRO 1 UNITS: 100 INJECTION, SOLUTION INTRAVENOUS; SUBCUTANEOUS at 17:37

## 2024-12-20 RX ADMIN — HYDROMORPHONE HYDROCHLORIDE 1 MG: 1 INJECTION, SOLUTION INTRAMUSCULAR; INTRAVENOUS; SUBCUTANEOUS at 00:56

## 2024-12-20 RX ADMIN — AZITHROMYCIN 250 MG: 500 TABLET, FILM COATED ORAL at 08:56

## 2024-12-20 RX ADMIN — INSULIN LISPRO 5 UNITS: 100 INJECTION, SOLUTION INTRAVENOUS; SUBCUTANEOUS at 12:28

## 2024-12-20 RX ADMIN — HYDROMORPHONE HYDROCHLORIDE 1 MG: 1 INJECTION, SOLUTION INTRAMUSCULAR; INTRAVENOUS; SUBCUTANEOUS at 10:01

## 2024-12-20 RX ADMIN — APIXABAN 5 MG: 5 TABLET, FILM COATED ORAL at 17:34

## 2024-12-20 RX ADMIN — BUDESONIDE AND FORMOTEROL FUMARATE DIHYDRATE 2 PUFF: 160; 4.5 AEROSOL RESPIRATORY (INHALATION) at 08:58

## 2024-12-20 RX ADMIN — PREDNISONE 15 MG: 5 TABLET ORAL at 08:59

## 2024-12-20 RX ADMIN — VALACYCLOVIR HYDROCHLORIDE 500 MG: 500 TABLET, FILM COATED ORAL at 08:55

## 2024-12-20 RX ADMIN — PREGABALIN 150 MG: 75 CAPSULE ORAL at 17:36

## 2024-12-20 RX ADMIN — PANCRELIPASE 48000 UNITS: 120000; 24000; 76000 CAPSULE, DELAYED RELEASE PELLETS ORAL at 08:58

## 2024-12-20 RX ADMIN — OXYCODONE HYDROCHLORIDE 10 MG: 10 TABLET ORAL at 13:08

## 2024-12-20 RX ADMIN — PANCRELIPASE 48000 UNITS: 120000; 24000; 76000 CAPSULE, DELAYED RELEASE PELLETS ORAL at 17:37

## 2024-12-20 RX ADMIN — INSULIN LISPRO 5 UNITS: 100 INJECTION, SOLUTION INTRAVENOUS; SUBCUTANEOUS at 09:00

## 2024-12-20 RX ADMIN — HEPARIN 500 UNITS: 100 SYRINGE at 17:39

## 2024-12-20 RX ADMIN — HYDROMORPHONE HYDROCHLORIDE 1 MG: 1 INJECTION, SOLUTION INTRAMUSCULAR; INTRAVENOUS; SUBCUTANEOUS at 05:20

## 2024-12-20 RX ADMIN — INSULIN LISPRO 1 UNITS: 100 INJECTION, SOLUTION INTRAVENOUS; SUBCUTANEOUS at 09:00

## 2024-12-20 RX ADMIN — OXYCODONE HYDROCHLORIDE 10 MG: 10 TABLET ORAL at 03:54

## 2024-12-20 RX ADMIN — PANCRELIPASE 48000 UNITS: 120000; 24000; 76000 CAPSULE, DELAYED RELEASE PELLETS ORAL at 12:29

## 2024-12-20 RX ADMIN — TACROLIMUS 2.5 MG: 0.5 CAPSULE ORAL at 17:35

## 2024-12-20 RX ADMIN — APIXABAN 5 MG: 5 TABLET, FILM COATED ORAL at 08:57

## 2024-12-20 RX ADMIN — INSULIN LISPRO 5 UNITS: 100 INJECTION, SOLUTION INTRAVENOUS; SUBCUTANEOUS at 17:38

## 2024-12-20 RX ADMIN — HYDROMORPHONE HYDROCHLORIDE 1 MG: 1 INJECTION, SOLUTION INTRAMUSCULAR; INTRAVENOUS; SUBCUTANEOUS at 14:02

## 2024-12-20 RX ADMIN — OXYCODONE HYDROCHLORIDE 10 MG: 10 TABLET ORAL at 17:35

## 2024-12-20 RX ADMIN — ATOVAQUONE 1500 MG: 750 SUSPENSION ORAL at 09:00

## 2024-12-20 RX ADMIN — PREGABALIN 150 MG: 75 CAPSULE ORAL at 08:56

## 2024-12-20 RX ADMIN — PANTOPRAZOLE SODIUM 40 MG: 40 TABLET, DELAYED RELEASE ORAL at 05:20

## 2024-12-20 RX ADMIN — INSULIN GLARGINE 12 UNITS: 100 INJECTION, SOLUTION SUBCUTANEOUS at 08:55

## 2024-12-20 RX ADMIN — TACROLIMUS 2.5 MG: 0.5 CAPSULE ORAL at 08:56

## 2024-12-20 RX ADMIN — OXYCODONE HYDROCHLORIDE 10 MG: 10 TABLET ORAL at 08:54

## 2024-12-20 NOTE — ASSESSMENT & PLAN NOTE
Lab Results   Component Value Date    HGBA1C 7.8 (H) 12/16/2024   Continue home Lantus  Sliding scale insulin goal 140-180  Patient recently has been given an insulin pump to start the trialing process however has not officially been placed on the pump  Commend patient continue her outpatient insulin treatment and transition to the pump as recommended by her endocrinologist.

## 2024-12-20 NOTE — ASSESSMENT & PLAN NOTE
Lab Results   Component Value Date    EGFR 70 12/20/2024    EGFR 83 12/19/2024    EGFR 64 12/18/2024    CREATININE 0.96 12/20/2024    CREATININE 0.84 12/19/2024    CREATININE 1.03 12/18/2024

## 2024-12-20 NOTE — PLAN OF CARE
Problem: INFECTION - ADULT  Goal: Absence or prevention of progression during hospitalization  Description: INTERVENTIONS:  - Assess and monitor for signs and symptoms of infection  - Monitor lab/diagnostic results  - Monitor all insertion sites, i.e. indwelling lines, tubes, and drains  - Monitor endotracheal if appropriate and nasal secretions for changes in amount and color  - [place] appropriate cooling/warming therapies per order  - Administer medications as ordered  - Instruct and encourage patient and family to use good hand hygiene technique  - Identify and instruct in appropriate isolation precautions for identified infection/condition  Outcome: Progressing     Problem: SAFETY ADULT  Goal: Patient will remain free of falls  Description: INTERVENTIONS:  - Educate patient/family on patient safety including physical limitations  - Instruct patient to call for assistance with activity   - Consult OT/PT to assist with strengthening/mobility   - Keep Call bell within reach  - Keep bed low and locked with side rails adjusted as appropriate  - Keep care items and personal belongings within reach  - Initiate and maintain comfort rounds  - Make Fall Risk Sign visible to staff  - patient ambulates independently  - Apply yellow socks and bracelet for high fall risk patients  - Consider moving patient to room near nurses station  Outcome: Progressing     Problem: Knowledge Deficit  Goal: Patient/family/caregiver demonstrates understanding of disease process, treatment plan, medications, and discharge instructions  Description: Complete learning assessment and assess knowledge base.  Interventions:  - Provide teaching at level of understanding  - Provide teaching via preferred learning methods  Outcome: Progressing     Problem: PAIN - ADULT  Goal: Verbalizes/displays adequate comfort level or baseline comfort level  Description: Interventions:  - Encourage patient to monitor pain and request assistance  - Assess pain  using appropriate pain scale  - Administer analgesics based on type and severity of pain and evaluate response  - Implement non-pharmacological measures as appropriate and evaluate response  - Consider cultural and social influences on pain and pain management  - Notify physician/advanced practitioner if interventions unsuccessful or patient reports new pain  Outcome: Progressing     Problem: GENITOURINARY - ADULT  Goal: Maintains or returns to baseline urinary function  Description: INTERVENTIONS:  - Assess urinary function  - Encourage oral fluids to ensure adequate hydration if ordered  - Administer IV fluids as ordered to ensure adequate hydration  - Administer ordered medications as needed  - Offer frequent toileting  - Follow urinary retention protocol if ordered  Outcome: Progressing

## 2024-12-20 NOTE — PROGRESS NOTES
"Progress Note - Urology   Name: Maximilian Mann 49 y.o. female I MRN: [id number]  Unit/Bed#: -01 I Date of Admission: 12/17/2024   Date of Service: 12/20/2024 I Hospital Day: 3     Assessment & Plan  Ureteral stent displacement (HCC)  Ureteral stent displacement/malpositioned  CT pigtail in distal left ureter remained urine the urinary bladder, no significant hydronephrosis  Bedside cystoscopy stent removal 12/18 by Dr. Briceno  Pain has improved  Stable for discharge  Expect continued improvement in pain as spasms and inflammation from stent removal subsides  Urology will sign off but remain available for any further inpatient needs. Please feel free to contact the provider currently covering the Urology [facility] role for this campus with questions or concerns.     Flank pain  Due to malpositioned ureteral stent  Improving  Cystic fibrosis (HCC)  Follows with Reyna      Subjective:   HPI: seen at bedside. Reports improvement since yesterday. Still with L flank pain but pain is tolerable, and comparable to her previous stent removal. She got her period today, so hard to evaluate hematuria.     Review of Systems   Constitutional:  Negative for chills and fever.   Respiratory:  Negative for cough and shortness of breath.    Cardiovascular:  Negative for chest pain and palpitations.   Gastrointestinal:  Positive for abdominal pain. Negative for vomiting.   Genitourinary:  Positive for flank pain. Negative for dysuria.   Musculoskeletal:  Negative for arthralgias and back pain.   Skin:  Negative for color change and rash.   Neurological:  Negative for seizures and syncope.   All other systems reviewed and are negative.      Objective:    Vitals: Blood pressure 146/85, pulse 76, temperature 98.9 °F (37.2 °C), resp. rate 20, height 5' 2.5\" (1.588 m), weight 53.9 kg (118 lb 13.3 oz), SpO2 94%, not currently breastfeeding.,Body mass index is 21.39 kg/m².    Physical Exam  Vitals reviewed. "   Constitutional:       General: She is not in acute distress.     Appearance: She is normal weight. She is not ill-appearing, toxic-appearing or diaphoretic.   HENT:      Head: Normocephalic and atraumatic.      Right Ear: External ear normal.      Left Ear: External ear normal.      Nose: Nose normal.      Mouth/Throat:      Mouth: Mucous membranes are moist.   Eyes:      General: No scleral icterus.     Conjunctiva/sclera: Conjunctivae normal.   Cardiovascular:      Rate and Rhythm: Normal rate.      Pulses: Normal pulses.   Pulmonary:      Effort: Pulmonary effort is normal.   Abdominal:      General: There is no distension.      Tenderness: There is abdominal tenderness. There is no right CVA tenderness, left CVA tenderness or guarding.   Neurological:      General: No focal deficit present.      Mental Status: She is oriented to person, place, and time. Mental status is at baseline.   Psychiatric:         Mood and Affect: Mood normal.         Behavior: Behavior normal.         Thought Content: Thought content normal.         Judgment: Judgment normal.         Labs:  Recent Labs     12/17/24 1932 12/18/24 0518 12/19/24  0527 12/20/24  0743   WBC 9.20 7.79 7.16 6.91       Recent Labs     12/17/24 1932 12/18/24 0518 12/19/24  0527 12/20/24  0743   HGB 9.7* 9.0* 9.1* 8.6*     Recent Labs     12/17/24 1932 12/18/24 0518 12/19/24  0527 12/20/24  0743   HCT 33.8* 31.0* 31.1* 29.9*     Recent Labs     12/17/24 1932 12/18/24 0518 12/19/24  0527 12/20/24  0743   CREATININE 1.12 1.03 0.84 0.96         History:    Past Medical History:   Diagnosis Date    Abnormal uterine bleeding (AUB) 7/7/2022    ABPA (allergic bronchopulmonary aspergillosis) ([facility]) 07/08/2012    Acute kidney injury ([facility]) 09/08/2017    Acute on chronic respiratory failure ([facility]) 03/04/2017    Last Assessment & Plan:  Formatting of this note might be different from the original. Recurrent with clinical worsening with enlarging PTX, s/p multiple  chest tubes, but new fevers 6/19 and worsening leukocytosis indicating infection as cause of 6/19 decompensation with profound hypoxia in setting of shunting through right lung and continued pneumothorax s/p 2nd chest tube placement.  6/27: Right    Anaphylaxis 06/06/2022    Anemia     Asthma     Chest pain 09/21/2021    Chronic pain     Chronic respiratory failure with hypoxia (HCC) 09/07/2021    COVID-19 06/03/2022    Cystic fibrosis (HCC)     Deep vein thrombosis (HCC)     Diabetes mellitus (HCC)     Fever 09/22/2024    GERD (gastroesophageal reflux disease)     HBP (high blood pressure)     HCAP (healthcare-associated pneumonia) 03/06/2020    History of transfusion     Influenza B 02/23/2020    Kidney stone     Moderate protein-calorie malnutrition (HCC) 01/08/2022    Neutropenic fever  ([facility]) 09/26/2020    Pneumonia due to Pseudomonas species ([facility]) 10/28/2010    Last Assessment & Plan:  Formatting of this note might be different from the original. Polymicrobial pna w/ pseudomonas, M. Abscessus, Cadida glabrata/albicans    PONV (postoperative nausea and vomiting)     Pseudomonas aeruginosa infection 09/20/2012    Renal calculus     Squamous cell skin cancer 01/12/2023    SCCIS- Left upper thigh    Transplant recipient     Lung- 2013, 2017    Ulcerative colitis ([facility])     Wellness examination 02/04/2013    Formatting of this note might be different from the original. Discussed with patient and acknowledgement form signed on 2/4/2013 (ALYSHA)     Social History     Socioeconomic History    Marital status: /Civil Union     Spouse name: Not on file    Number of children: Not on file    Years of education: 12    Highest education level: Not on file   Occupational History    Occupation: Disabled    Tobacco Use    Smoking status: Former     Current packs/day: 0.00     Average packs/day: 0.5 packs/day for 12.0 years (6.0 ttl pk-yrs)     Types: Cigarettes     Start date: 01/1993     Quit date: 01/2005     Years since  quittin.9     Passive exposure: Past    Smokeless tobacco: Never   Vaping Use    Vaping status: Never Used   Substance and Sexual Activity    Alcohol use: Yes     Alcohol/week: 2.0 standard drinks of alcohol     Types: 2 Glasses of wine per week     Comment: social    Drug use: Yes     Frequency: 2.0 times per week     Types: Marijuana     Comment: medical edible marijuana prescribed    Sexual activity: Yes     Partners: Male     Birth control/protection: None   Other Topics Concern    Not on file   Social History Narrative    Most recent tobacco use screenin2019    Do you currently or have you served in the [employer]: No    Were you activated, into active duty, as a member of the [employer] or as a Reservist: No    Occupation: disabled    Education: 12    Marital status:     Exercise level: Occasional    Diet: Specific    low sugar, high fat    General stress level: High    Alcohol intake: Occasional    Caffeine intake: Heavy    Chewing tobacco: none    Illicit drugs: none    Guns present in home: No    Seat belts used routinely: Yes    Smoke alarm in home: Yes    Advance directive: Yes     Social Drivers of Health     Financial Resource Strain: Low Risk  (11/10/2022)    Received from [facility]    Overall Financial Resource Strain (CARDIA)     Difficulty of Paying Living Expenses: Not hard at all   Food Insecurity: No Food Insecurity (2024)    Nursing - Inadequate Food Risk Classification     Worried About Running Out of Food in the Last Year: Never true     Ran Out of Food in the Last Year: Never true     Ran Out of Food in the Last Year: 1   Transportation Needs: No Transportation Needs (2024)    Nursing - Transportation Risk Classification     Lack of Transportation: Not on file     Lack of Transportation: 2   Physical Activity: Insufficiently Active (1/15/2024)    Exercise Vital Sign     Days of  Exercise per Week: 7 days     Minutes of Exercise per Session: 20 min   Stress: No Stress Concern Present (1/15/2024)    [employer] - Occupational Stress Questionnaire     Feeling of Stress : Only a little   Social Connections: Unknown (2024)    Received from [employer]     How often do you feel lonely or isolated from those around you? (Adult - for ages 18 years and over): Not on file   Intimate Partner Violence: Unknown (2024)    Nursing IPS     Feels Physically and Emotionally Safe: Not on file     Physically Hurt by Someone: Not on file     Humiliated or Emotionally Abused by Someone: Not on file     Physically Hurt by Someone: 2     Hurt or Threatened by Someone: 2   Housing Stability: Unknown (2024)    Nursing: Inadequate Housing Risk Classification     Has Housing: Not on file     Worried About Losing Housing: Not on file     Unable to Get Utilities: Not on file     Unable to Pay for Housing in the Last Year: 2     Has Housin     Past Surgical History:   Procedure Laterality Date    APPENDECTOMY      AUGMENTATION MAMMAPLASTY Bilateral 2004    AUGMENTATION MAMMAPLASTY Right     removed     BREAST SURGERY      Aug.     BRONCHOSCOPY      CHOLECYSTECTOMY      COLONOSCOPY      EGD  2019    Yeast Infect.     FL RETROGRADE PYELOGRAM  2020    FL RETROGRADE PYELOGRAM  2020    FL RETROGRADE PYELOGRAM  2024    GASTROSCOPY  2024    GASTROSTOMY TUBE, PLACE      IR BALLOON-OCCLUDED ANTEGRADE TRANSVENOUS OBLITERATION (BATO)  2022    IR PORT PLACEMENT  2022    IR PORT REMOVAL  01/10/2022    IR PORT STRIPPING  2022    IR TUNNELED CENTRAL LINE PLACEMENT  2022    LUNG SURGERY      LUNG TRANSPLANT      X2    [place] CYSTO BLADDER W/URETERAL CATHETERIZATION Right 2020    Procedure: CYSTOSCOPY RETROGRADE PYELOGRAM WITH INSERTION STENT URETERAL;  Surgeon: Patricio Perry MD;  Location: [facility];   Service: Urology    [place] CYSTO/URETERO W/LITHOTRIPSY &INDWELL STENT INSRT Right 02/04/2020    Procedure: CYSTOSCOPY URETEROSCOPY WITH LITHOTRIPSY HOLMIUM LASER, RETROGRADE PYELOGRAM AND INSERTION STENT URETERAL;  Surgeon: Patricio Perry MD;  Location: [facility];  Service: Urology    [place] CYSTO/URETERO W/LITHOTRIPSY &INDWELL STENT INSRT Left 9/20/2024    Procedure: CYSTOSCOPY URETEROSCOPY WITH LITHOTRIPSY HOLMIUM LASER, RETROGRADE PYELOGRAM AND INSERTION STENT URETERAL, BASKET STONE EXTRACTION;  Surgeon: Lee Briceno MD;  Location:  MAIN OR;  Service: Urology    SKIN BIOPSY      US GUIDED VASCULAR ACCESS  01/17/2017     Family History   Problem Relation Age of Onset    Alcohol abuse Mother     Mental illness Mother     Depression Mother     Alcohol abuse Father     COPD Maternal Grandmother     Lung cancer Maternal Grandmother 65    Cancer Maternal Grandmother     COPD Maternal Grandfather     Cancer Maternal Grandfather     COPD Paternal Grandmother     Lung cancer Paternal Grandmother 65    No Known Problems Sister     No Known Problems Paternal Aunt     Breast cancer Neg Hx     Ovarian cancer Neg Hx     Colon cancer Neg Hx        Michelle Zhang PA-C  Date: 12/20/2024 Time: 9:40 AM

## 2024-12-20 NOTE — CASE MANAGEMENT
Case Management Discharge Planning Note    Patient name Maximilian Mann  Location /-01 MRN [id number]  : 1975 Date 2024       Current Admission Date: 2024  Current Admission Diagnosis:Flank pain   Patient Active Problem List    Diagnosis Date Noted Date Diagnosed    Ureteral stent displacement ([facility]) 2024     Flank pain 2024     Thrombocytopenia ([facility]) 2024     Bacterial conjunctivitis 2024     Positive culture findings in sputum 2024     Chest pain 2024     Pancytopenia, acquired ([facility]) 10/24/2024     History of pulmonary embolus (PE) 2024     History of DVT (deep vein thrombosis) 2024     Sore throat 2024     Vaginal yeast infection 2024     Intestinal metaplasia of antrum of stomach without dysplasia 2024     Hx of lung transplant ([facility]) 01/15/2024     Continuous opioid dependence ([facility]) 01/15/2024     Stage 3a chronic kidney disease ([facility]) 2023     Depressed mood 2023     Chronic pancreatitis, unspecified pancreatitis type ([facility]) 2023     Pulmonary embolus ([facility]) 2022     Right leg pain 10/13/2022     Chronic cough 2022     Renal calculus, left 2022     Pulmonary nodules 2022     Rib pain 2022     Gastroparesis 2022     Ulcerative pancolitis without complication ([facility]) 2022     Neuropathic pain of right foot 01/10/2022     Weakness of right lower extremity 2021     Pulmonary hypertension (HCC) 2021     Cystic fibrosis ([facility]) 2021     Essential hypertension 2021     Hypercholesteremia 2021     Long term current use of bisphosphonates 2020     Long term current use of systemic steroids 2020     Osteopenia 2020     Chronic pain disorder 2020     Asthma 2020     Nausea and vomiting 2020     Hiatal hernia 2019     Anemia 2018     DVT (deep venous thrombosis) ([facility]) 2018      Immunosuppression (HCC) 03/12/2017     Colon polyp, personal history 07/18/2016     S/P pneumonectomy 03/07/2013     Anxiety 07/08/2012     Type 1 diabetes mellitus with diabetic chronic kidney disease, unspecified CKD stage (HCC) 10/28/2010     Pancreatic insufficiency 10/28/2010     Chronic sinusitis 10/28/2010     GERD (gastroesophageal reflux disease) 10/28/2010       LOS (days): 3  Geometric Mean LOS (GMLOS) (days): 3.2  Days to GMLOS:0.4     OBJECTIVE:  Risk of Unplanned Readmission Score: 41.4         Current admission status: Inpatient   Preferred Pharmacy:   [facility] - [place], [place] - [address] [facility]  [address]  [address]  [facility] [address]  Phone: [phone] Fax: [phone]    [facility] #[id number] - [place] - [address]  [address]  [place] [address]  Phone: [phone] Fax: [phone]    Primary Care Provider: Solis Avila MD    Primary Insurance: BLUE CROSS  Secondary Insurance: MEDICARE    DISCHARGE DETAILS:           IMM Given (Date):: 12/20/24  IMM Given to:: Patient (CM reviewed IMM with pt at bedside. Patient reported understanding their rights and declined any questions or concerns. Patient was given a copy and signed copy placed in medical records.)      Patient declined [facility].

## 2024-12-20 NOTE — PLAN OF CARE
Problem: PAIN - ADULT  Goal: Verbalizes/displays adequate comfort level or baseline comfort level  Description: Interventions:  - Encourage patient to monitor pain and request assistance  - Assess pain using appropriate pain scale  - Administer analgesics based on type and severity of pain and evaluate response  - Implement non-pharmacological measures as appropriate and evaluate response  - Consider cultural and social influences on pain and pain management  - Notify physician/advanced practitioner if interventions unsuccessful or patient reports new pain  Outcome: Progressing     Problem: INFECTION - ADULT  Goal: Absence or prevention of progression during hospitalization  Description: INTERVENTIONS:  - Assess and monitor for signs and symptoms of infection  - Monitor lab/diagnostic results  - Monitor all insertion sites, i.e. indwelling lines, tubes, and drains  - Monitor endotracheal if appropriate and nasal secretions for changes in amount and color  - [place] appropriate cooling/warming therapies per order  - Administer medications as ordered  - Instruct and encourage patient and family to use good hand hygiene technique  - Identify and instruct in appropriate isolation precautions for identified infection/condition  Outcome: Progressing     Problem: SAFETY ADULT  Goal: Patient will remain free of falls  Description: INTERVENTIONS:  - Educate patient/family on patient safety including physical limitations  - Instruct patient to call for assistance with activity   - Consult OT/PT to assist with strengthening/mobility   - Keep Call bell within reach  - Keep bed low and locked with side rails adjusted as appropriate  - Keep care items and personal belongings within reach  - Initiate and maintain comfort rounds  - Make Fall Risk Sign visible to staff  - patient ambulates independently  - Apply yellow socks and bracelet for high fall risk patients  - Consider moving patient to room near nurses station  Outcome:  Progressing

## 2024-12-20 NOTE — ASSESSMENT & PLAN NOTE
Lab Results   Component Value Date    CREATININE 0.84 12/19/2024    CREATININE 1.03 12/18/2024    CREATININE 1.12 12/17/2024       Baseline creatinine approximately 0.7-1  Creatinine at baseline

## 2024-12-20 NOTE — ASSESSMENT & PLAN NOTE
Ureteral stent displacement/malpositioned  CT pigtail in distal left ureter remained urine the urinary bladder, no significant hydronephrosis  Bedside cystoscopy stent removal 12/18 by Dr. Briceno  Pain has improved  Stable for discharge  Expect continued improvement in pain as spasms and inflammation from stent removal subsides  Urology will sign off but remain available for any further inpatient needs. Please feel free to contact the provider currently covering the Urology [facility] role for this campus with questions or concerns.

## 2024-12-20 NOTE — DISCHARGE SUMMARY
Former[facility]  Discharge Summary  Name: Maximilian Mann I MRN: [id number]  Unit/Bed#: -01I Date of Admission: 12/17/2024   Date of Service: 12/17/2024  I Hospital Day: 3    * Flank pain  Assessment & Plan  48-year-old female with history of cystic fibrosis, type 1 diabetes with a history of kidney stone in September 2024 with stent placed.    Initially scheduled for cystoscopy and stent removal on 12/26.  Presented with left flank pain with associated nausea.  CT scan revealed migrated stent in the distal ureter.  Noted 1 to 2 mm residual stone with no hydro  UA revealed moderate leukocytes, WBC and occasional bacteria  Urology following, S/P stent removal on 12/18  Patient reports on going flank pain discomfort, will monitor for additional day  No repeat imaging indicated at this time  Supportive care  Continue antibiotics x5 total of 5 days day 2/5 can transition to oral for discharge  Urine culture growing mixed contaminates       Hx of lung transplant ([facility])  Assessment & Plan  History of 2 previous lung transplants on tacrolimus/prednisone  Continue home medications    Stage 3a chronic kidney disease (HCC)  Assessment & Plan  Lab Results   Component Value Date    CREATININE 0.84 12/19/2024    CREATININE 1.03 12/18/2024    CREATININE 1.12 12/17/2024       Baseline creatinine approximately 0.7-1  Creatinine at baseline    Pulmonary embolus ([facility])  Assessment & Plan  Continue Eliquis     Cystic fibrosis ([facility])  Assessment & Plan  History of cystic fibrosis on tacrolimus/prednisone     GERD (gastroesophageal reflux disease)  Assessment & Plan  Continue PPI and Pepcid    Type 1 diabetes mellitus with diabetic chronic kidney disease, unspecified CKD stage ([facility])  Assessment & Plan    Lab Results   Component Value Date    HGBA1C 7.8 (H) 12/16/2024   Continue home Lantus  Sliding scale insulin goal 140-180  Patient recently has been given an insulin pump to start the trialing process  however has not officially been placed on the pump  Commend patient continue her outpatient insulin treatment and transition to the pump as recommended by her endocrinologist.              Medical Problems       Resolved Problems  Date Reviewed: 12/19/2024   None       Discharging Physician / Practitioner: WAYNE Ball  PCP: Solis Avila MD  Admission Date:   Admission Orders (From admission, onward)       Ordered        12/17/24 2308  INPATIENT ADMISSION  Once                          Discharge Date: 12/20/24    Consultations During Hospital Stay:  IP CONSULT TO UROLOGY    Procedures Performed:   Cystoscopy at bedside for stent removal    Significant Findings / Test Results:   CT renal stone study abdomen pelvis without contrast  Result Date: 12/17/2024  1.  Malpositioned the left ureteral stent with its pigtail in the distal ureter and the remainder of the stent coiled within the bladder. No significant hydronephrosis. 2.  1 to 2 mm nonobstructive calculus in the left kidney. 3.  Moderate thickening of the distal esophagus which may be due to esophagitis. The stomach is distended with fluid. 4.  Other findings as above. Workstation performed: [id number]         Incidental Findings:   None      Test Results Pending at Discharge (will require follow up):   None     Outpatient Tests Requested:  None    Complications: None    Reason for Admission: Flank pain with CT imaging findings of stent migration    Hospital Course:   Maximilian Mann is a 49 y.o. female patient with  has a past medical history of Abnormal uterine bleeding (AUB) (7/7/2022), ABPA (allergic bronchopulmonary aspergillosis) ([facility]) (07/08/2012), Acute kidney injury ([facility]) (09/08/2017), Acute on chronic respiratory failure ([facility]) (03/04/2017), Anaphylaxis (06/06/2022), Anemia, Asthma, Chest pain (09/21/2021), Chronic pain, Chronic respiratory failure with hypoxia ([facility]) (09/07/2021), COVID-19 (06/03/2022), Cystic fibrosis ([facility]), Deep vein  "thrombosis (HCC), Diabetes mellitus (HCC), Fever (09/22/2024), GERD (gastroesophageal reflux disease), HBP (high blood pressure), HCAP (healthcare-associated pneumonia) (03/06/2020), History of transfusion, Influenza B (02/23/2020), Kidney stone, Moderate protein-calorie malnutrition (HCC) (01/08/2022), Neutropenic fever  (HCC) (09/26/2020), Pneumonia due to Pseudomonas species ([facility]) (10/28/2010), PONV (postoperative nausea and vomiting), Pseudomonas aeruginosa infection (09/20/2012), Renal calculus, Squamous cell skin cancer (01/12/2023), Transplant recipient, Ulcerative colitis ([facility]), and Wellness examination (02/04/2013). who originally presented to the hospital on 12/17/2024 due to Flank Pain (L sided flank pain started hours ago, scheduled for scope on 12/26. Hx of lung transplant, denies fevers. /).  Due to pain patient came in found via CT imaging to have a stent migration into the urinary bladder urology was consulted who 12/18 had bedside cystoscopy with stent removal by urology patient had considerable ongoing flank pain was kept inpatient for pain management control for an additional 48 hours overall felt well on 12/20 and was discharged home.      Please see above list of diagnoses and related plan for additional information.     Condition at Discharge: good    Discharge Day Visit / Exam:   Subjective: Patient reports her pain has been much more tolerable today and she does feel like that she could manage her symptoms home at home discussion with her and her  at bedside agreeable with the above plan.  Vitals: Blood Pressure: 134/65 (12/20/24 1601)  Pulse: 79 (12/20/24 1601)  Temperature: 98.8 °F (37.1 °C) (12/20/24 1601)  Temp Source: Oral (12/18/24 0348)  Respirations: 16 (12/20/24 1601)  Height: 5' 2.5\" (158.8 cm) (12/18/24 0348)  Weight - Scale: 53.9 kg (118 lb 13.3 oz) (12/18/24 0352)  SpO2: 94 % (12/20/24 1601)  Exam:   Physical Exam  Vitals and nursing note reviewed.   Constitutional:       " General: She is not in acute distress.     Appearance: She is well-developed.   HENT:      Head: Normocephalic and atraumatic.   Eyes:      Conjunctiva/sclera: Conjunctivae normal.   Cardiovascular:      Rate and Rhythm: Normal rate and regular rhythm.      Heart sounds: No murmur heard.  Pulmonary:      Effort: Pulmonary effort is normal. No respiratory distress.      Breath sounds: Normal breath sounds.   Abdominal:      Palpations: Abdomen is soft.      Tenderness: There is no abdominal tenderness.   Musculoskeletal:         General: No swelling.      Cervical back: Neck supple.   Skin:     General: Skin is warm and dry.      Capillary Refill: Capillary refill takes less than 2 seconds.   Neurological:      Mental Status: She is alert and oriented to person, place, and time.   Psychiatric:         Mood and Affect: Mood normal.           Discussion with Family: Updated  () at bedside.    Discharge instructions/Information to patient and family:   See after visit summary for information provided to patient and family.      Provisions for Follow-Up Care:  See after visit summary for information related to follow-up care and any pertinent home health orders.      Mobility at time of Discharge:   Basic Mobility Inpatient Raw Score: 23  JH-HLM Goal: 7: Walk 25 feet or more  JH-HLM Achieved: 7: Walk 25 feet or more  HLM Goal achieved. Continue to encourage appropriate mobility.     Disposition:   Home    Planned Readmission: None     Discharge Statement:  I spent 40 minutes discharging the patient. This time was spent on the day of discharge. I had direct contact with the patient on the day of discharge. Greater than 50% of the total time was spent examining patient, answering all patient questions, arranging and discussing plan of care with patient as well as directly providing post-discharge instructions.  Additional time then spent on discharge activities.    Discharge Medications:  See after  visit summary for reconciled discharge medications provided to patient and/or family.      **Please Note: This note may have been constructed using a voice recognition system**

## 2024-12-20 NOTE — PROGRESS NOTES
AVS reviewed with pt and her spouse, instruction for taking discharge medications as well as side effects explained, both denied having question/concern, port was de-access by SUSIE Euceda

## 2024-12-20 NOTE — ASSESSMENT & PLAN NOTE
48-year-old female with history of cystic fibrosis, type 1 diabetes with a history of kidney stone in September 2024 with stent placed.    Initially scheduled for cystoscopy and stent removal on 12/26.  Presented with left flank pain with associated nausea.  CT scan revealed migrated stent in the distal ureter.  Noted 1 to 2 mm residual stone with no hydro  UA revealed moderate leukocytes, WBC and occasional bacteria  Urology following, S/P stent removal on 12/18  Patient reports on going flank pain discomfort, will monitor for additional day  No repeat imaging indicated at this time  Supportive care  Continue antibiotics x5 total of 5 days day 2/5 can transition to oral for discharge  Urine culture growing mixed contaminates

## 2024-12-20 NOTE — ASSESSMENT & PLAN NOTE
Lab Results   Component Value Date    HGBA1C 7.8 (H) 12/16/2024       Recent Labs     12/19/24  1108 12/19/24  1604 12/19/24  2104 12/20/24  0855   POCGLU 213* 187* 332* 162*       Blood Sugar Average: Last 72 hrs:  (P) [id number]

## 2024-12-22 LAB
BACTERIA BLD CULT: NORMAL
BACTERIA BLD CULT: NORMAL

## 2024-12-23 ENCOUNTER — PATIENT OUTREACH (OUTPATIENT)
Dept: CASE MANAGEMENT | Facility: OTHER | Age: 49
End: 2024-12-23

## 2024-12-23 ENCOUNTER — TRANSITIONAL CARE MANAGEMENT (OUTPATIENT)
Dept: INTERNAL MEDICINE CLINIC | Facility: CLINIC | Age: 49
End: 2024-12-23

## 2024-12-23 DIAGNOSIS — H10.9 BACTERIAL CONJUNCTIVITIS: ICD-10-CM

## 2024-12-23 DIAGNOSIS — Z91.89 HIGH RISK FOR READMISSION: Primary | ICD-10-CM

## 2024-12-23 LAB
BACTERIA BLD CULT: NORMAL
BACTERIA BLD CULT: NORMAL

## 2024-12-23 NOTE — UTILIZATION REVIEW
NOTIFICATION OF ADMISSION DISCHARGE   This is a Notification of Discharge from [facility]. Please be advised that this patient has been discharge from our facility. Below you will find the admission and discharge date and time including the patient’s disposition.   UTILIZATION REVIEW CONTACT:  Nisha Moreno  Utilization   Network Utilization Review Department  Phone: [phone] x carefully listen to the prompts. All voicemails are confidential.  Email: [email or web address]     ADMISSION INFORMATION  PRESENTATION DATE: 12/17/2024  6:02 PM  OBERVATION ADMISSION DATE: N/A  INPATIENT ADMISSION DATE: 12/17/24 11:08 PM   DISCHARGE DATE: 12/20/2024  6:20 PM   DISPOSITION:Home/Self Care    Network Utilization Review Department  ATTENTION: Please call with any questions or concerns to [phone] and carefully listen to the prompts so that you are directed to the right person. All voicemails are confidential.   For Discharge needs, contact Care Management DC Support Team at [phone] opt. 2  Send all requests for admission clinical reviews, approved or denied determinations and any other requests to dedicated fax number below belonging to the campus where the patient is receiving treatment. List of dedicated fax numbers for the Facilities:  FACILITY NAME UR FAX NUMBER   ADMISSION DENIALS (Administrative/Medical Necessity) [phone]   DISCHARGE SUPPORT TEAM ([facility]) [phone]   PARENT CHILD HEALTH (Maternity/NICU/Pediatrics) [phone]   General acute hospital [phone]   [facility] [phone]   [facility] [phone]   [facility] [phone]   [facility] [phone]   [facility] [phone]   [facility] [phone]   [facility]  [phone]   [facility] [phone]   [facility] [phone]   [facility] [phone]   [facility] [phone]

## 2024-12-23 NOTE — PROGRESS NOTES
"Outpatient Care Management Note    HRR referral received. Chart review completed.  Patient with history of cystic fibrosis, Type 1 diabetes, and lung transplant.    Patient was hospitalized at [facility] 12/17/24-12/20/24 for flank pain.  Patient has history of kidney stone in September 2024 with stent placed.  CT revealed migrated stent in distal ureter.  Patient is status post stent removal on 12/18/24.    RN CM will place outreach call to follow up after hospitalization and to assess for care management needs.    Call placed and spoke briefly with patient.  Introduced self, role and reason for call.  RN CM identified role and reason for call.    Patient states that she is doing fine.  She states \"this is not the first time going through something like this and states that she feels confident in her ability to manage her care.  She states that she's had some minimal discomfort \"but nothing that isn't manageable.  She states that she is voiding without any issue. She denies any fevers.    Patient states that she has all of discharge information and medications. RN CM offered to reviewed but patient declined need to review with RN CM at this time.    RN CM explained role and care management to patient.  Patient verbalized appreciation for follow up call and declined need for care management support stating again that she is confident in her ability to manage her care. She declined need for further RN CM outreach.    RN CM will remove self from care team and close referral at this time.  "

## 2024-12-24 DIAGNOSIS — E10.22 TYPE 1 DIABETES MELLITUS WITH DIABETIC CHRONIC KIDNEY DISEASE, UNSPECIFIED CKD STAGE (HCC): ICD-10-CM

## 2024-12-24 RX ORDER — ACYCLOVIR 400 MG/1
1 TABLET ORAL
Qty: 3 EACH | Refills: 5 | Status: SHIPPED | OUTPATIENT
Start: 2024-12-24

## 2024-12-25 DIAGNOSIS — E10.22 TYPE 1 DIABETES MELLITUS WITH DIABETIC CHRONIC KIDNEY DISEASE, UNSPECIFIED CKD STAGE (HCC): ICD-10-CM

## 2024-12-25 RX ORDER — INSULIN DEGLUDEC 100 U/ML
12 INJECTION, SOLUTION SUBCUTANEOUS DAILY
Qty: 15 ML | Refills: 1 | Status: SHIPPED | OUTPATIENT
Start: 2024-12-25

## 2024-12-25 RX ORDER — ALCOHOL ANTISEPTIC PADS
PADS, MEDICATED (EA) TOPICAL
Qty: 100 EACH | Refills: 3 | Status: SHIPPED | OUTPATIENT
Start: 2024-12-25

## 2024-12-25 RX ORDER — INSULIN ASPART 100 [IU]/ML
INJECTION, SOLUTION INTRAVENOUS; SUBCUTANEOUS
Qty: 45 ML | Refills: 1 | Status: SHIPPED | OUTPATIENT
Start: 2024-12-25

## 2024-12-26 RX ORDER — AZELASTINE HYDROCHLORIDE 0.5 MG/ML
SOLUTION/ DROPS OPHTHALMIC
Qty: 6 ML | Refills: 0 | Status: SHIPPED | OUTPATIENT
Start: 2024-12-26

## 2025-01-03 PROBLEM — H10.9 BACTERIAL CONJUNCTIVITIS: Status: RESOLVED | Noted: 2024-12-04 | Resolved: 2025-01-03

## 2025-01-13 DIAGNOSIS — E84.9 CYSTIC FIBROSIS (HCC): ICD-10-CM

## 2025-01-13 RX ORDER — ALPRAZOLAM 1 MG/1
1 TABLET ORAL
Qty: 30 TABLET | Refills: 2 | Status: ON HOLD | OUTPATIENT
Start: 2025-01-13

## 2025-01-15 DIAGNOSIS — R05.3 CHRONIC COUGH: Primary | ICD-10-CM

## 2025-01-15 DIAGNOSIS — E84.9 CYSTIC FIBROSIS (HCC): ICD-10-CM

## 2025-01-15 DIAGNOSIS — F11.20 CONTINUOUS OPIOID DEPENDENCE (HCC): Primary | ICD-10-CM

## 2025-01-15 RX ORDER — PROMETHAZINE HYDROCHLORIDE AND CODEINE PHOSPHATE 6.25; 1 MG/5ML; MG/5ML
2.5 SYRUP ORAL EVERY 6 HOURS PRN
Qty: 118 ML | Refills: 0 | Status: ON HOLD | OUTPATIENT
Start: 2025-01-15 | End: 2025-02-14

## 2025-01-15 RX ORDER — OXYCODONE HYDROCHLORIDE 10 MG/1
10 TABLET ORAL EVERY 4 HOURS PRN
Qty: 180 TABLET | Refills: 0 | Status: ON HOLD | OUTPATIENT
Start: 2025-01-15 | End: 2025-02-14

## 2025-01-19 ENCOUNTER — APPOINTMENT (EMERGENCY)
Dept: CT IMAGING | Facility: HOSPITAL | Age: 50
DRG: 204 | End: 2025-01-19
Payer: COMMERCIAL

## 2025-01-19 ENCOUNTER — HOSPITAL ENCOUNTER (INPATIENT)
Facility: HOSPITAL | Age: 50
LOS: 9 days | Discharge: HOME | DRG: 204 | End: 2025-01-28
Admitting: STUDENT IN AN ORGANIZED HEALTH CARE EDUCATION/TRAINING PROGRAM
Payer: COMMERCIAL

## 2025-01-19 ENCOUNTER — APPOINTMENT (EMERGENCY)
Dept: RADIOLOGY | Facility: HOSPITAL | Age: 50
DRG: 204 | End: 2025-01-19
Payer: COMMERCIAL

## 2025-01-19 DIAGNOSIS — Z94.2 HX OF LUNG TRANSPLANT (HCC): ICD-10-CM

## 2025-01-19 DIAGNOSIS — E84.9 CYSTIC FIBROSIS (HCC): ICD-10-CM

## 2025-01-19 DIAGNOSIS — R07.81 PLEURITIC CHEST PAIN: ICD-10-CM

## 2025-01-19 DIAGNOSIS — G89.4 CHRONIC PAIN DISORDER: ICD-10-CM

## 2025-01-19 DIAGNOSIS — R05.8 PRODUCTIVE COUGH: ICD-10-CM

## 2025-01-19 DIAGNOSIS — R06.00 DYSPNEA: Primary | ICD-10-CM

## 2025-01-19 PROBLEM — J18.9 PNEUMONIA DUE TO INFECTIOUS ORGANISM: Status: ACTIVE | Noted: 2025-01-19

## 2025-01-19 PROBLEM — E87.20 LACTIC ACIDOSIS: Status: ACTIVE | Noted: 2025-01-19

## 2025-01-19 LAB
2HR DELTA HS TROPONIN: -1 NG/L
ALBUMIN SERPL BCG-MCNC: 3.5 G/DL (ref 3.5–5)
ALP SERPL-CCNC: 70 U/L (ref 34–104)
ALT SERPL W P-5'-P-CCNC: 23 U/L (ref 7–52)
ANION GAP SERPL CALCULATED.3IONS-SCNC: 7 MMOL/L (ref 4–13)
APTT PPP: 46 SECONDS (ref 23–34)
AST SERPL W P-5'-P-CCNC: 17 U/L (ref 13–39)
BASOPHILS # BLD AUTO: 0.02 THOUSANDS/ΜL (ref 0–0.1)
BASOPHILS NFR BLD AUTO: 0 % (ref 0–1)
BILIRUB SERPL-MCNC: 0.4 MG/DL (ref 0.2–1)
BUN SERPL-MCNC: 38 MG/DL (ref 5–25)
CALCIUM SERPL-MCNC: 8.2 MG/DL (ref 8.4–10.2)
CARDIAC TROPONIN I PNL SERPL HS: 10 NG/L (ref ?–50)
CARDIAC TROPONIN I PNL SERPL HS: 11 NG/L (ref ?–50)
CHLORIDE SERPL-SCNC: 94 MMOL/L (ref 96–108)
CO2 SERPL-SCNC: 39 MMOL/L (ref 21–32)
CREAT SERPL-MCNC: 1.33 MG/DL (ref 0.6–1.3)
D DIMER PPP FEU-MCNC: 1.12 UG/ML FEU
EOSINOPHIL # BLD AUTO: 0.07 THOUSAND/ΜL (ref 0–0.61)
EOSINOPHIL NFR BLD AUTO: 1 % (ref 0–6)
ERYTHROCYTE [DISTWIDTH] IN BLOOD BY AUTOMATED COUNT: 18.3 % (ref 11.6–15.1)
FLUAV RNA RESP QL NAA+PROBE: NEGATIVE
FLUBV RNA RESP QL NAA+PROBE: NEGATIVE
GFR SERPL CREATININE-BSD FRML MDRD: 47 ML/MIN/1.73SQ M
GLUCOSE SERPL-MCNC: 181 MG/DL (ref 65–140)
GLUCOSE SERPL-MCNC: 236 MG/DL (ref 65–140)
HCT VFR BLD AUTO: 30.5 % (ref 34.8–46.1)
HGB BLD-MCNC: 8.4 G/DL (ref 11.5–15.4)
IMM GRANULOCYTES # BLD AUTO: 0.07 THOUSAND/UL (ref 0–0.2)
IMM GRANULOCYTES NFR BLD AUTO: 1 % (ref 0–2)
INR PPP: 1.16 (ref 0.85–1.19)
LACTATE SERPL-SCNC: 2.5 MMOL/L (ref 0.5–2)
LACTATE SERPL-SCNC: 2.7 MMOL/L (ref 0.5–2)
LYMPHOCYTES # BLD AUTO: 0.85 THOUSANDS/ΜL (ref 0.6–4.47)
LYMPHOCYTES NFR BLD AUTO: 11 % (ref 14–44)
MCH RBC QN AUTO: 22.5 PG (ref 26.8–34.3)
MCHC RBC AUTO-ENTMCNC: 27.5 G/DL (ref 31.4–37.4)
MCV RBC AUTO: 82 FL (ref 82–98)
MONOCYTES # BLD AUTO: 0.85 THOUSAND/ΜL (ref 0.17–1.22)
MONOCYTES NFR BLD AUTO: 11 % (ref 4–12)
NEUTROPHILS # BLD AUTO: 5.67 THOUSANDS/ΜL (ref 1.85–7.62)
NEUTS SEG NFR BLD AUTO: 76 % (ref 43–75)
NRBC BLD AUTO-RTO: 0 /100 WBCS
PLATELET # BLD AUTO: 157 THOUSANDS/UL (ref 149–390)
PMV BLD AUTO: 10.6 FL (ref 8.9–12.7)
POTASSIUM SERPL-SCNC: 4.4 MMOL/L (ref 3.5–5.3)
PROCALCITONIN SERPL-MCNC: 0.06 NG/ML
PROT SERPL-MCNC: 6.4 G/DL (ref 6.4–8.4)
PROTHROMBIN TIME: 15.5 SECONDS (ref 12.3–15)
RBC # BLD AUTO: 3.74 MILLION/UL (ref 3.81–5.12)
RSV RNA RESP QL NAA+PROBE: NEGATIVE
SARS-COV-2 RNA RESP QL NAA+PROBE: NEGATIVE
SODIUM SERPL-SCNC: 140 MMOL/L (ref 135–147)
WBC # BLD AUTO: 7.53 THOUSAND/UL (ref 4.31–10.16)

## 2025-01-19 PROCEDURE — 87205 SMEAR GRAM STAIN: CPT

## 2025-01-19 PROCEDURE — 80053 COMPREHEN METABOLIC PANEL: CPT

## 2025-01-19 PROCEDURE — 99223 1ST HOSP IP/OBS HIGH 75: CPT

## 2025-01-19 PROCEDURE — 85730 THROMBOPLASTIN TIME PARTIAL: CPT

## 2025-01-19 PROCEDURE — 99285 EMERGENCY DEPT VISIT HI MDM: CPT

## 2025-01-19 PROCEDURE — 71275 CT ANGIOGRAPHY CHEST: CPT

## 2025-01-19 PROCEDURE — 96375 TX/PRO/DX INJ NEW DRUG ADDON: CPT

## 2025-01-19 PROCEDURE — 85025 COMPLETE CBC W/AUTO DIFF WBC: CPT

## 2025-01-19 PROCEDURE — 71045 X-RAY EXAM CHEST 1 VIEW: CPT

## 2025-01-19 PROCEDURE — 82948 REAGENT STRIP/BLOOD GLUCOSE: CPT

## 2025-01-19 PROCEDURE — 96376 TX/PRO/DX INJ SAME DRUG ADON: CPT

## 2025-01-19 PROCEDURE — 96361 HYDRATE IV INFUSION ADD-ON: CPT

## 2025-01-19 PROCEDURE — 36415 COLL VENOUS BLD VENIPUNCTURE: CPT

## 2025-01-19 PROCEDURE — 84145 PROCALCITONIN (PCT): CPT

## 2025-01-19 PROCEDURE — 84484 ASSAY OF TROPONIN QUANT: CPT

## 2025-01-19 PROCEDURE — 0241U HB NFCT DS VIR RESP RNA 4 TRGT: CPT

## 2025-01-19 PROCEDURE — 83605 ASSAY OF LACTIC ACID: CPT

## 2025-01-19 PROCEDURE — 85610 PROTHROMBIN TIME: CPT

## 2025-01-19 PROCEDURE — 85379 FIBRIN DEGRADATION QUANT: CPT

## 2025-01-19 PROCEDURE — 80197 ASSAY OF TACROLIMUS: CPT

## 2025-01-19 PROCEDURE — 87070 CULTURE OTHR SPECIMN AEROBIC: CPT

## 2025-01-19 PROCEDURE — 96365 THER/PROPH/DIAG IV INF INIT: CPT

## 2025-01-19 PROCEDURE — 87040 BLOOD CULTURE FOR BACTERIA: CPT

## 2025-01-19 PROCEDURE — 87147 CULTURE TYPE IMMUNOLOGIC: CPT

## 2025-01-19 PROCEDURE — 93005 ELECTROCARDIOGRAM TRACING: CPT

## 2025-01-19 PROCEDURE — 0202U NFCT DS 22 TRGT SARS-COV-2: CPT

## 2025-01-19 RX ORDER — HYDROMORPHONE HCL IN WATER/PF 6 MG/30 ML
0.2 PATIENT CONTROLLED ANALGESIA SYRINGE INTRAVENOUS ONCE
Status: COMPLETED | OUTPATIENT
Start: 2025-01-19 | End: 2025-01-19

## 2025-01-19 RX ORDER — RIBOFLAVIN (VITAMIN B2) 100 MG
100 TABLET ORAL DAILY
Status: DISCONTINUED | OUTPATIENT
Start: 2025-01-20 | End: 2025-01-19

## 2025-01-19 RX ORDER — POLYETHYLENE GLYCOL 3350 17 G/17G
17 POWDER, FOR SOLUTION ORAL 2 TIMES DAILY
Status: DISCONTINUED | OUTPATIENT
Start: 2025-01-19 | End: 2025-01-28 | Stop reason: HOSPADM

## 2025-01-19 RX ORDER — PREGABALIN 75 MG/1
150 CAPSULE ORAL 2 TIMES DAILY
Status: DISCONTINUED | OUTPATIENT
Start: 2025-01-19 | End: 2025-01-28 | Stop reason: HOSPADM

## 2025-01-19 RX ORDER — HYDROMORPHONE HCL/PF 1 MG/ML
0.5 SYRINGE (ML) INJECTION EVERY 2 HOUR PRN
Status: DISCONTINUED | OUTPATIENT
Start: 2025-01-19 | End: 2025-01-21

## 2025-01-19 RX ORDER — ACETAMINOPHEN 325 MG/1
975 TABLET ORAL EVERY 6 HOURS PRN
Status: DISCONTINUED | OUTPATIENT
Start: 2025-01-19 | End: 2025-01-28 | Stop reason: HOSPADM

## 2025-01-19 RX ORDER — MAGNESIUM HYDROXIDE/ALUMINUM HYDROXICE/SIMETHICONE 120; 1200; 1200 MG/30ML; MG/30ML; MG/30ML
30 SUSPENSION ORAL EVERY 6 HOURS PRN
Status: DISCONTINUED | OUTPATIENT
Start: 2025-01-19 | End: 2025-01-28 | Stop reason: HOSPADM

## 2025-01-19 RX ORDER — PREDNISONE 20 MG/1
20 TABLET ORAL DAILY
Status: DISCONTINUED | OUTPATIENT
Start: 2025-01-20 | End: 2025-01-27

## 2025-01-19 RX ORDER — VALACYCLOVIR HYDROCHLORIDE 500 MG/1
500 TABLET, FILM COATED ORAL DAILY
Status: DISCONTINUED | OUTPATIENT
Start: 2025-01-20 | End: 2025-01-20

## 2025-01-19 RX ORDER — HYDROMORPHONE HCL/PF 1 MG/ML
0.5 SYRINGE (ML) INJECTION ONCE
Status: COMPLETED | OUTPATIENT
Start: 2025-01-19 | End: 2025-01-19

## 2025-01-19 RX ORDER — OXYBUTYNIN CHLORIDE 5 MG/1
15 TABLET, EXTENDED RELEASE ORAL
Status: DISCONTINUED | OUTPATIENT
Start: 2025-01-19 | End: 2025-01-28 | Stop reason: HOSPADM

## 2025-01-19 RX ORDER — ASCORBIC ACID 500 MG
250 TABLET ORAL DAILY
Status: DISCONTINUED | OUTPATIENT
Start: 2025-01-20 | End: 2025-01-28 | Stop reason: HOSPADM

## 2025-01-19 RX ORDER — PANTOPRAZOLE SODIUM 40 MG/1
40 TABLET, DELAYED RELEASE ORAL
Status: DISCONTINUED | OUTPATIENT
Start: 2025-01-19 | End: 2025-01-20

## 2025-01-19 RX ORDER — FAMOTIDINE 20 MG/1
40 TABLET, FILM COATED ORAL
Status: DISCONTINUED | OUTPATIENT
Start: 2025-01-19 | End: 2025-01-28 | Stop reason: HOSPADM

## 2025-01-19 RX ORDER — TACROLIMUS 0.5 MG/1
3 CAPSULE ORAL 2 TIMES DAILY
Status: DISCONTINUED | OUTPATIENT
Start: 2025-01-19 | End: 2025-01-28 | Stop reason: HOSPADM

## 2025-01-19 RX ORDER — DOXYCYCLINE 100 MG/10ML
100 INJECTION, POWDER, LYOPHILIZED, FOR SOLUTION INTRAVENOUS EVERY 12 HOURS
Status: DISCONTINUED | OUTPATIENT
Start: 2025-01-19 | End: 2025-01-19

## 2025-01-19 RX ORDER — ONDANSETRON 2 MG/ML
4 INJECTION INTRAMUSCULAR; INTRAVENOUS EVERY 6 HOURS PRN
Status: DISCONTINUED | OUTPATIENT
Start: 2025-01-19 | End: 2025-01-28 | Stop reason: HOSPADM

## 2025-01-19 RX ORDER — SODIUM CHLORIDE 9 MG/ML
75 INJECTION, SOLUTION INTRAVENOUS CONTINUOUS
Status: DISPENSED | OUTPATIENT
Start: 2025-01-19 | End: 2025-01-20

## 2025-01-19 RX ORDER — BUDESONIDE AND FORMOTEROL FUMARATE DIHYDRATE 160; 4.5 UG/1; UG/1
2 AEROSOL RESPIRATORY (INHALATION) EVERY 12 HOURS
Status: DISCONTINUED | OUTPATIENT
Start: 2025-01-19 | End: 2025-01-28 | Stop reason: HOSPADM

## 2025-01-19 RX ORDER — HYDROCODONE BITARTRATE AND HOMATROPINE METHYLBROMIDE ORAL SOLUTION 5; 1.5 MG/5ML; MG/5ML
5 LIQUID ORAL EVERY 4 HOURS PRN
Status: DISCONTINUED | OUTPATIENT
Start: 2025-01-19 | End: 2025-01-28 | Stop reason: HOSPADM

## 2025-01-19 RX ORDER — ALPRAZOLAM 0.5 MG
1 TABLET ORAL
Status: DISCONTINUED | OUTPATIENT
Start: 2025-01-19 | End: 2025-01-28 | Stop reason: HOSPADM

## 2025-01-19 RX ORDER — AZITHROMYCIN 250 MG/1
250 TABLET, FILM COATED ORAL 3 TIMES WEEKLY
Status: DISCONTINUED | OUTPATIENT
Start: 2025-01-20 | End: 2025-01-28 | Stop reason: HOSPADM

## 2025-01-19 RX ORDER — GUAIFENESIN 600 MG/1
1200 TABLET, EXTENDED RELEASE ORAL EVERY 12 HOURS SCHEDULED
Status: DISCONTINUED | OUTPATIENT
Start: 2025-01-19 | End: 2025-01-28 | Stop reason: HOSPADM

## 2025-01-19 RX ORDER — BISACODYL 5 MG/1
5 TABLET, DELAYED RELEASE ORAL
Status: DISCONTINUED | OUTPATIENT
Start: 2025-01-19 | End: 2025-01-28 | Stop reason: HOSPADM

## 2025-01-19 RX ORDER — IPRATROPIUM BROMIDE AND ALBUTEROL SULFATE 2.5; .5 MG/3ML; MG/3ML
3 SOLUTION RESPIRATORY (INHALATION) EVERY 6 HOURS PRN
Status: DISCONTINUED | OUTPATIENT
Start: 2025-01-19 | End: 2025-01-28 | Stop reason: HOSPADM

## 2025-01-19 RX ORDER — ATOVAQUONE 750 MG/5ML
1500 SUSPENSION ORAL DAILY
Status: DISCONTINUED | OUTPATIENT
Start: 2025-01-20 | End: 2025-01-28 | Stop reason: HOSPADM

## 2025-01-19 RX ORDER — OXYCODONE HYDROCHLORIDE 5 MG/1
5 TABLET ORAL EVERY 4 HOURS PRN
Status: DISCONTINUED | OUTPATIENT
Start: 2025-01-19 | End: 2025-01-28 | Stop reason: HOSPADM

## 2025-01-19 RX ORDER — INSULIN LISPRO 100 [IU]/ML
1-5 INJECTION, SOLUTION INTRAVENOUS; SUBCUTANEOUS
Status: DISCONTINUED | OUTPATIENT
Start: 2025-01-19 | End: 2025-01-28 | Stop reason: HOSPADM

## 2025-01-19 RX ORDER — INSULIN GLARGINE 100 [IU]/ML
12 INJECTION, SOLUTION SUBCUTANEOUS EVERY MORNING
Status: DISCONTINUED | OUTPATIENT
Start: 2025-01-20 | End: 2025-01-25

## 2025-01-19 RX ORDER — OXYCODONE HYDROCHLORIDE 10 MG/1
10 TABLET ORAL EVERY 4 HOURS PRN
Status: DISCONTINUED | OUTPATIENT
Start: 2025-01-19 | End: 2025-01-28 | Stop reason: HOSPADM

## 2025-01-19 RX ADMIN — SODIUM CHLORIDE 500 ML: 0.9 INJECTION, SOLUTION INTRAVENOUS at 13:46

## 2025-01-19 RX ADMIN — SODIUM CHLORIDE 75 ML/HR: 0.9 INJECTION, SOLUTION INTRAVENOUS at 20:17

## 2025-01-19 RX ADMIN — HYDROMORPHONE HYDROCHLORIDE 0.5 MG: 1 INJECTION, SOLUTION INTRAMUSCULAR; INTRAVENOUS; SUBCUTANEOUS at 15:41

## 2025-01-19 RX ADMIN — OXYCODONE HYDROCHLORIDE 10 MG: 10 TABLET ORAL at 20:05

## 2025-01-19 RX ADMIN — PREGABALIN 150 MG: 75 CAPSULE ORAL at 20:06

## 2025-01-19 RX ADMIN — BUDESONIDE AND FORMOTEROL FUMARATE DIHYDRATE 2 PUFF: 160; 4.5 AEROSOL RESPIRATORY (INHALATION) at 22:53

## 2025-01-19 RX ADMIN — HYDROMORPHONE HYDROCHLORIDE 0.5 MG: 1 INJECTION, SOLUTION INTRAMUSCULAR; INTRAVENOUS; SUBCUTANEOUS at 21:12

## 2025-01-19 RX ADMIN — DOXYCYCLINE 100 MG: 100 INJECTION, POWDER, LYOPHILIZED, FOR SOLUTION INTRAVENOUS at 21:06

## 2025-01-19 RX ADMIN — HYDROMORPHONE HYDROCHLORIDE 0.5 MG: 1 INJECTION, SOLUTION INTRAMUSCULAR; INTRAVENOUS; SUBCUTANEOUS at 13:52

## 2025-01-19 RX ADMIN — PANCRELIPASE 48000 UNITS: 120000; 24000; 76000 CAPSULE, DELAYED RELEASE PELLETS ORAL at 20:06

## 2025-01-19 RX ADMIN — APIXABAN 5 MG: 5 TABLET, FILM COATED ORAL at 20:06

## 2025-01-19 RX ADMIN — TACROLIMUS 3 MG: 1 CAPSULE ORAL at 20:14

## 2025-01-19 RX ADMIN — OXYBUTYNIN CHLORIDE 15 MG: 5 TABLET, EXTENDED RELEASE ORAL at 22:26

## 2025-01-19 RX ADMIN — PANTOPRAZOLE SODIUM 40 MG: 40 TABLET, DELAYED RELEASE ORAL at 22:26

## 2025-01-19 RX ADMIN — GUAIFENESIN 1200 MG: 600 TABLET ORAL at 20:06

## 2025-01-19 RX ADMIN — HYDROMORPHONE HYDROCHLORIDE 0.2 MG: 0.2 INJECTION, SOLUTION INTRAMUSCULAR; INTRAVENOUS; SUBCUTANEOUS at 13:05

## 2025-01-19 RX ADMIN — HYDROMORPHONE HYDROCHLORIDE 0.5 MG: 1 INJECTION, SOLUTION INTRAMUSCULAR; INTRAVENOUS; SUBCUTANEOUS at 23:33

## 2025-01-19 RX ADMIN — FAMOTIDINE 40 MG: 20 TABLET, FILM COATED ORAL at 22:26

## 2025-01-19 RX ADMIN — INSULIN LISPRO 2 UNITS: 100 INJECTION, SOLUTION INTRAVENOUS; SUBCUTANEOUS at 21:18

## 2025-01-19 RX ADMIN — BISACODYL 5 MG: 5 TABLET, COATED ORAL at 22:26

## 2025-01-19 RX ADMIN — IOHEXOL 85 ML: 350 INJECTION, SOLUTION INTRAVENOUS at 14:35

## 2025-01-19 RX ADMIN — POLYETHYLENE GLYCOL 3350 17 G: 17 POWDER, FOR SOLUTION ORAL at 20:13

## 2025-01-19 RX ADMIN — SODIUM CHLORIDE, SODIUM LACTATE, POTASSIUM CHLORIDE, AND CALCIUM CHLORIDE 500 ML: .6; .31; .03; .02 INJECTION, SOLUTION INTRAVENOUS at 18:45

## 2025-01-19 RX ADMIN — CEFTRIAXONE SODIUM 1000 MG: 10 INJECTION, POWDER, FOR SOLUTION INTRAVENOUS at 15:53

## 2025-01-19 NOTE — ASSESSMENT & PLAN NOTE
History of severe GERD status post fundoplication in 2023   Patient currently taking dexlansoprazole 60 mg daily and famotidine 40 mg daily at bedtime  Maalox ordered as needed for acid reflux

## 2025-01-19 NOTE — ASSESSMENT & PLAN NOTE
Patient presentation due to sore throat, persistent cough, shortness of breath, wheezing, pleuritic chest pain starting since Friday.  Patient reports the symptoms are getting worse despite all her home over-the-counter regiment including nebulizer, Mucinex.  In the ED patient hemodynamically stable on room air.  Does not meeting sepsis criteria  Patient was started on IV ceftriaxone in the ED.  CT PE study ruling out acute PE Left lung transplant demonstrating compensatory hyperinflation and diffuse tree-in-bud nodularity consistent with infectious/inflammatory etiology similar to prior study   Per Letitia patient okay to remain hospitalized here to receive IV antibiotics.    Plan:  Will continue with IV antibiotics ceftriaxone and started doxycycline given history of MRSA in sputum.  Pending sputum culture  Consulted ID for further antibiotic management  Continue Mucinex and Hycodan for cough  Pending procalcitonin  Pending RP 2 panel  Nebulizer as needed

## 2025-01-19 NOTE — ASSESSMENT & PLAN NOTE
On admission patient with creatinine 1.3.  Baseline creatinine 0.8  Suspected to be prerenal.  Also took Lasix in the a.m. due to facial and upper extremity swelling.  Received a bolus of 500 mL of IV fluids in the ED.  Will start with IV fluids 75 mL an hour for 10 hours  Reassess creatinine tomorrow

## 2025-01-19 NOTE — ASSESSMENT & PLAN NOTE
Elevated lactic acid 2.7 and improved with IV fluids to 2.5.  Unclear the cause of elevated lactic acid given patient is not meeting any sepsis criteria.  However given patient immunocompromised we will treat as induced by infection.  Will continue with IV fluids

## 2025-01-19 NOTE — ASSESSMENT & PLAN NOTE
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  Has had recurrent admissions for pneumonia and Pseudomonas/MSSA and status post pneumonectomy.  Reports that it is in chronic rejection.  Follows with Reyna  Per discussion of ED staff with the open patient okay to remain for IV antibiotic.  Patient currently taking tacrolimus 3 mg twice a day and prednisone increased to 20 mg daily  Patient takes for prophylaxis azithromycin 250 mg 3 times a week Monday Wednesday Friday, atovaquone 1500 milligrams daily for PJP prophylaxis and Valtrex 500 mg daily  Plan:  Pending tacrolimus level.  Continue with the rest immunosuppressive and prophylactic medication.

## 2025-01-19 NOTE — H&P
"H&P - Hospitalist   Name: Maximilian Mann 49 y.o. female I MRN: [id number]  Unit/Bed#: ED 11 I Date of Admission: 1/19/2025   Date of Service: 1/19/2025 I Hospital Day: 0     Assessment & Plan  Pneumonia due to infectious organism  Patient presentation due to sore throat, persistent cough, shortness of breath, wheezing, pleuritic chest pain starting since Friday.  Patient reports the symptoms are getting worse despite all her home over-the-counter regiment including nebulizer, Mucinex.  In the ED patient hemodynamically stable on room air.  Does not meeting sepsis criteria  Patient was started on IV ceftriaxone in the ED.  CT PE study ruling out acute PE Left lung transplant demonstrating compensatory hyperinflation and diffuse tree-in-bud nodularity consistent with infectious/inflammatory etiology similar to prior study   Per Letitia patient okay to remain hospitalized here to receive IV antibiotics.    Plan:  Will continue with IV antibiotics ceftriaxone and started doxycycline given history of MRSA in sputum.  Pending sputum culture  Consulted ID for further antibiotic management  Continue Mucinex and Hycodan for cough  Pending procalcitonin  Pending RP 2 panel  Nebulizer as needed      Type 1 diabetes mellitus with diabetic chronic kidney disease, unspecified CKD stage ([facility])  Lab Results   Component Value Date    HGBA1C 7.8 (H) 12/16/2024       No results for input(s): \"POCGLU\" in the last 72 hours.    Blood Sugar Average: Last 72 hrs:  Patient currently reports that the home regimen being glargine 12 units in the morning and aspart with meals based on sugar numbers patient adjust the insulin.  Will start on Lantus 12 units in the morning and 10 units lispro with meals.  Sliding scale  Hypoglycemia protocol  Diabetic diet    Pancreatic insufficiency  Continue Creon  GEOFF (acute kidney injury) ([facility])  On admission patient with creatinine 1.3.  Baseline creatinine 0.8  Suspected to be prerenal.  Also took Lasix " in the a.m. due to facial and upper extremity swelling.  Received a bolus of 500 mL of IV fluids in the ED.  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  Has had recurrent admissions for pneumonia and Pseudomonas/MSSA and status post pneumonectomy.  Reports that it is in chronic rejection.  Follows with Reyna  Per discussion of ED staff with the open patient okay to remain for IV antibiotic.  Patient currently taking tacrolimus 3 mg twice a day and prednisone increased to 20 mg daily  Patient takes for prophylaxis azithromycin 250 mg 3 times a week Monday Wednesday Friday, atovaquone 1500 milligrams daily for PJP prophylaxis and Valtrex 500 mg daily  Plan:  Pending tacrolimus level.  Continue with the rest immunosuppressive and prophylactic medication.  History of DVT (deep vein thrombosis)  Continue with Eliquis 5 mg twice a day.  Pleuritic chest pain  Complains of increased cough and pleuritic pain under her ribs.  Patient also with shortness of breath and sore throat.  The symptoms started since Friday and getting worse.  Pain regiment as needed with oxycodone and Dilaudid  Mucinex 1200 mg every 12 hours  Hycodan every 4 hours as needed for refractory cough  Lactic acidosis  Elevated lactic acid 2.7 and improved with IV fluids to 2.5.  Unclear the cause of elevated lactic acid given patient is not meeting any sepsis criteria.  However given patient immunocompromised we will treat as  induced by infection.  Will continue with IV fluids      VTE Pharmacologic Prophylaxis: VTE Score: 3 Moderate Risk (Score 3-4) - Pharmacological DVT Prophylaxis Ordered: apixaban (Eliquis).  Code Status: Level 1 - Full Code patient  Discussion with family: Patient declined call to .     Anticipated Length of Stay: Patient will be admitted on an inpatient basis with an anticipated length of stay of greater than 2 midnights secondary to pneumonia.    History of Present Illness   Chief Complaint: Persistent cough, shortness of breath, sore throat    Maximilian Mann is a 49 y.o. female with a PMH of cystic fibrosis s/p lung transplant known chronic rejection, on immunosuppression with prednisone and tacrolimus, severe GERD, pancreatic insufficiency and type 1 diabetes on long-term insulin who presents with shortness of breath, persistent cough, sore throat started since Friday.  Patient denies having fever or chills.  Reports the symptoms were progressively getting worse.  The cough was worsening to the point that patient started to have pain in her ribs not relieved by oxycodone.  Given patient history of lung transplant presented to the ED. in the ED patient hemodynamically stable saturating 90 to 94% on room air.  Patient does not meet sepsis criteria however workup for sepsis is pending given patient is immunocompromised.  Patient was noted to have lactic acidosis.  CT PE study showing bud in tree appearance concerning for inflammatory/infectious.  Per ED team they were in contact with you plan and they were okay to remain at this hospital for further treatment.    Review of Systems   Constitutional:  Negative for chills and fever.   HENT:  Positive for sore throat. Negative for ear pain.    Eyes:  Negative for pain and visual disturbance.   Respiratory:  Positive for cough, shortness of breath and wheezing.    Cardiovascular:  Negative for chest pain and palpitations.   Gastrointestinal:   Negative for abdominal pain and vomiting.   Genitourinary:  Negative for dysuria and hematuria.   Musculoskeletal:  Negative for arthralgias and back pain.   Skin:  Negative for color change and rash.   Neurological:  Negative for seizures and syncope.   All other systems reviewed and are negative.      Historical Information   Past Medical History:   Diagnosis Date    Abnormal uterine bleeding (AUB) 7/7/2022    ABPA (allergic bronchopulmonary aspergillosis) ([facility]) 07/08/2012    Acute kidney injury ([facility]) 09/08/2017    Acute on chronic respiratory failure ([facility]) 03/04/2017    Last Assessment & Plan:  Formatting of this note might be different from the original. Recurrent with clinical worsening with enlarging PTX, s/p multiple chest tubes, but new fevers 6/19 and worsening leukocytosis indicating infection as cause of 6/19 decompensation with profound hypoxia in setting of shunting through right lung and continued pneumothorax s/p 2nd chest tube placement.  6/27: Right    Anaphylaxis 06/06/2022    Anemia     Asthma     Chest pain 09/21/2021    Chronic pain     Chronic respiratory failure with hypoxia ([facility]) 09/07/2021    COVID-19 06/03/2022    Cystic fibrosis ([facility])     Deep vein thrombosis ([facility])     Diabetes mellitus ([facility])     Fever 09/22/2024    GERD (gastroesophageal reflux disease)     HBP (high blood pressure)     HCAP (healthcare-associated pneumonia) 03/06/2020    History of transfusion     Influenza B 02/23/2020    Kidney stone     Moderate protein-calorie malnutrition ([facility]) 01/08/2022    Neutropenic fever  ([facility]) 09/26/2020    Pneumonia due to Pseudomonas species ([facility]) 10/28/2010    Last Assessment & Plan:  Formatting of this note might be different from the original. Polymicrobial pna w/ pseudomonas, M. Abscessus, Cadida glabrata/albicans    PONV (postoperative nausea and vomiting)     Pseudomonas aeruginosa infection 09/20/2012    Renal calculus     Squamous cell skin cancer 01/12/2023    SCCIS- Left upper  thigh    Transplant recipient     Lung- 2013, 2017    Ulcerative colitis (HCC)     Wellness examination 02/04/2013    Formatting of this note might be different from the original. Discussed with patient and acknowledgement form signed on 2/4/2013 (ALYSHA)     Past Surgical History:   Procedure Laterality Date    APPENDECTOMY      AUGMENTATION MAMMAPLASTY Bilateral 2004    AUGMENTATION MAMMAPLASTY Right 2021    removed     BREAST SURGERY      Aug.     BRONCHOSCOPY      CHOLECYSTECTOMY      COLONOSCOPY      EGD  07/2019    [facility].     FL RETROGRADE PYELOGRAM  01/09/2020    FL RETROGRADE PYELOGRAM  02/04/2020    FL RETROGRADE PYELOGRAM  9/20/2024    GASTROSCOPY  03/01/2024    GASTROSTOMY TUBE, PLACE      IR BALLOON-OCCLUDED ANTEGRADE TRANSVENOUS OBLITERATION (BATO)  11/22/2022    IR PORT PLACEMENT  03/22/2022    IR PORT REMOVAL  01/10/2022    IR PORT STRIPPING  11/25/2022    IR TUNNELED CENTRAL LINE PLACEMENT  01/13/2022    LUNG SURGERY      LUNG TRANSPLANT      X2    SC CYSTO/URETERO W/LITHOTRIPSY &INDWELL STENT INSRT Right 02/04/2020    Procedure: CYSTOSCOPY URETEROSCOPY WITH LITHOTRIPSY HOLMIUM LASER, RETROGRADE PYELOGRAM AND INSERTION STENT URETERAL;  Surgeon: Patricio Perry MD;  Location: [facility];  Service: Urology    SC CYSTO/URETERO W/LITHOTRIPSY &INDWELL STENT INSRT Left 9/20/2024    Procedure: CYSTOSCOPY URETEROSCOPY WITH LITHOTRIPSY HOLMIUM LASER, RETROGRADE PYELOGRAM AND INSERTION STENT URETERAL, BASKET STONE EXTRACTION;  Surgeon: Lee Briceno MD;  Location: [facility];  Service: Urology    SC CYSTOURETHROSCOPY W/URETERAL CATHETERIZATION Right 01/09/2020    Procedure: CYSTOSCOPY RETROGRADE PYELOGRAM WITH INSERTION STENT URETERAL;  Surgeon: Patricio Perry MD;  Location: [facility];  Service: Urology    SKIN BIOPSY      US GUIDED VASCULAR ACCESS  01/17/2017     Social History     Tobacco Use    Smoking status: Former     Current packs/day: 0.00     Average packs/day: 0.5 packs/day for  12.0 years (6.0 ttl pk-yrs)     Types: Cigarettes     Start date: 1993     Quit date: 2005     Years since quittin.0     Passive exposure: Past    Smokeless tobacco: Never   Vaping Use    Vaping status: Never Used   Substance and Sexual Activity    Alcohol use: Yes     Alcohol/week: 2.0 standard drinks of alcohol     Types: 2 Glasses of wine per week     Comment: social    Drug use: Yes     Frequency: 2.0 times per week     Types: Marijuana     Comment: medical edible marijuana prescribed    Sexual activity: Yes     Partners: Male     Birth control/protection: None     E-Cigarette/Vaping    E-Cigarette Use Never User      E-Cigarette/Vaping Substances    Nicotine No     THC No     CBD No     Flavoring No     Other No     Unknown No      Family History   Problem Relation Age of Onset    Alcohol abuse Mother     Mental illness Mother     Depression Mother     Alcohol abuse Father     COPD Maternal Grandmother     Lung cancer Maternal Grandmother 65    Cancer Maternal Grandmother     COPD Maternal Grandfather     Cancer Maternal Grandfather     COPD Paternal Grandmother     Lung cancer Paternal Grandmother 65    No Known Problems Sister     No Known Problems Paternal Aunt     Breast cancer Neg Hx     Ovarian cancer Neg Hx     Colon cancer Neg Hx      Social History:  Marital Status: /Civil Union   Occupation:   Patient Pre-hospital Living Situation: Home  Patient Pre-hospital Level of Mobility: walks  Patient Pre-hospital Diet Restrictions:     Meds/Allergies   I have reviewed home medications with patient personally.  Prior to Admission medications    Medication Sig Start Date End Date Taking? Authorizing Provider   acetaminophen (TYLENOL) 325 mg tablet Take 3 tablets (975 mg total) by mouth every 8 (eight) hours 21  Yes WAYNE Paredes   albuterol (2.5 mg/3 mL) 0.083 % nebulizer solution Take 6 mL (5 mg total) by nebulization 2 (two) times a day 21  Yes WAYNE Paredes   albuterol  (PROVENTIL HFA,VENTOLIN HFA) 90 mcg/act inhaler INHALE 2 PUFFS EVERY 8 HOURS AS NEEDED FOR SHORTNESS OF BREATH OR WHEEZING. 11/29/23  Yes Historical Provider, MD   ALPRAZolam (XANAX) 1 mg tablet Take 1 tablet (1 mg total) by mouth daily at bedtime as needed for anxiety or sleep 1/13/25  Yes Gwendolyn Nuñez MD   apixaban (ELIQUIS) 5 mg Take 1 tablet (5 mg total) by mouth 2 (two) times a day 6/5/23 1/19/25 Yes Solis Avila MD   Ascorbic Acid (vitamin C) 100 MG tablet Take 100 mg by mouth daily   Yes Historical Provider, MD   atovaquone (MEPRON) 750 mg/5 mL suspension Take 1,500 mg by mouth daily   Yes Historical Provider, MD   azithromycin (ZITHROMAX) 250 mg tablet Take 250 mg by mouth 3 (three) times a week 1/6/23  Yes Historical Provider, MD   BD Pen Needle Abi U/F 32G X 4 MM MISC 4 times daily 6/19/24  Yes Eve Polk MD   bisacodyl (DULCOLAX) 5 mg EC tablet Take 1 tablet (5 mg total) by mouth daily at bedtime 5/7/23  Yes Armand Juarez MD   budesonide-formoterol (SYMBICORT) 160-4.5 mcg/act inhaler Inhale 2 puffs every 12 (twelve) hours 9/12/24  Yes Historical Provider, MD   Cholecalciferol (Vitamin D3) 125 MCG (5000 UT) TABS Take 5,000 Units by mouth daily   Yes Historical Provider, MD   Continuous Blood Gluc  (Dexcom G7 ) SUKHI Use 1 each continuous 7/31/23  Yes Eve Polk MD   Continuous Glucose Sensor (Dexcom G7 Sensor) Use 1 Device every 10 days 12/24/24  Yes Eve Polk MD   dexlansoprazole (DEXILANT) 60 MG capsule TAKE 1 CAPSULE BY MOUTH EVERY DAY IN THE MORNING 7/19/24  Yes WAYNE Nunez   dronabinol (MARINOL) 5 MG capsule Take 1 capsule (5 mg total) by mouth 3 (three) times a day 9/11/23  Yes Gwendolyn Nuñez MD   famotidine (PEPCID) 40 MG tablet TAKE 1 TABLET BY MOUTH EVERYDAY AT BEDTIME 10/24/24  Yes Gwendolyn Nuñez MD   furosemide (LASIX) 20 mg tablet Take 40 mg by mouth daily   Yes Historical Provider, MD   insulin aspart (NovoLOG FlexPen)  100 UNIT/ML injection pen INJECT 10 UNITS pre-BREAKFAST, 8 UNITS pre-LUNCH and 12 UNITS pre-DINNER plus scale; max 50 UNITS/day 12/25/24  Yes Eve Polk MD   insulin degludec (Tresiba FlexTouch) 100 units/mL injection pen Inject 12 Units under the skin daily 12/25/24  Yes Eve Polk MD   Insulin Pen Needle (Comfort EZ Pen Needles) 33G X 4 MM MISC Use to inject insulin 4 times a day 12/25/24  Yes Eve Polk MD   magnesium gluconate (MAGONATE) 500 mg tablet Take 500 mg by mouth 2 (two) times a day   Yes Historical Provider, MD   ondansetron (ZOFRAN) 4 mg tablet Take 4 mg by mouth every 8 (eight) hours as needed for nausea 3/21/24  Yes Historical Provider, MD   oxybutynin (DITROPAN XL) 15 MG 24 hr tablet Take 1 tablet (15 mg total) by mouth daily at bedtime 9/3/24  Yes Gianna Rob PA-C   oxyCODONE (ROXICODONE) 10 MG TABS Take 1 tablet (10 mg total) by mouth every 4 (four) hours as needed for moderate pain or severe pain Max Daily Amount: 60 mg 1/15/25 2/14/25 Yes Solis Avila MD   pancrelipase, Lip-Prot-Amyl, (CREON) 24,000 units Take 48,000 Units by mouth 3 (three) times a day with meals   Yes Historical Provider, MD   polyethylene glycol (MIRALAX) 17 g packet Take 17 g by mouth 2 (two) times a day 5/7/23  Yes Armand Juarez MD   predniSONE 10 mg tablet Take 20 mg by mouth daily 4/27/21  Yes Historical Provider, MD   pregabalin (LYRICA) 150 mg capsule Take 1 capsule (150 mg total) by mouth 2 times a day 7/18/24  Yes Gwendolyn Nuñez MD   promethazine-codeine (PHENERGAN WITH CODEINE) 6.25-10 mg/5 mL syrup Take 2.5 mL by mouth every 6 (six) hours as needed for cough 1/15/25 2/14/25 Yes Solis Avila MD   Respiratory Therapy Supplies (Nebulizer) SUKHI Please dispense nebulizer machine 7/16/21  Yes Historical Provider, MD   tacrolimus (PROGRAF) 1 mg capsule Take 2 capsules (2 mg total) by mouth 2 (two) times a day  Patient taking differently: Take 3 mg by mouth 2 (two) times a day 9/26/24   Yes Andrea Dos Santos PA-C   Turmeric 500 MG CAPS Take by mouth   Yes Historical Provider, MD   valACYclovir (VALTREX) 500 mg tablet Take 500 mg by mouth daily 6/1/21  Yes Historical Provider, MD   vitamin B-12 (VITAMIN B-12) 1,000 mcg tablet Take by mouth daily   Yes Historical Provider, MD   azelastine (OPTIVAR) 0.05 % ophthalmic solution INSTILL 1 DROP INTO BOTH EYES TWICE A DAY 12/26/24   Kathya Hanson MD   cyclobenzaprine (FLEXERIL) 5 mg tablet Take 1 tablet (5 mg total) by mouth 3 (three) times a day as needed for muscle spasms for up to 14 days 11/18/24 12/2/24  WAYNE Saavedra   dicyclomine (BENTYL) 10 mg capsule Take 2 capsules (20 mg total) by mouth 4 (four) times a day (before meals and at bedtime)  Patient not taking: Reported on 1/19/2025 11/18/24 12/18/24  WAYNE Saavedra   Insulin Disposable Pump (Omnipod 5 G6 Intro, Gen 5,) KIT Use 1 Units continuous 3/6/24   Eve Polk MD   Insulin Disposable Pump (Omnipod 5 G6 Pods, Gen 5,) MISC Use 1 Units every other day 3/6/24   Eve Polk MD   naloxone (NARCAN) 4 mg/0.1 mL nasal spray Administer 1 spray into a nostril. If no response after 2-3 minutes, give another dose in the other nostril using a new spray. 1/15/25 1/15/26  Solis Avila MD   pantoprazole (PROTONIX) 40 mg tablet Take 1 tablet (40 mg total) by mouth 2 (two) times a day before meals 11/18/24 12/18/24  WAYNE Saavedra   polymyxin b-trimethoprim (POLYTRIM) ophthalmic solution Administer 1 drop to both eyes every 4 (four) hours 12/4/24 1/19/25  Wally Patel MD   tamsulosin (FLOMAX) 0.4 mg Take 1 capsule (0.4 mg total) by mouth daily with dinner  Patient not taking: Reported on 11/10/2024 9/26/24 1/19/25  Andrea Dos Santos PA-C   traZODone (DESYREL) 50 mg tablet Take 0.5 tablets (25 mg total) by mouth daily at bedtime 9/26/21 11/8/22  WAYNE Paredes     Allergies   Allergen Reactions    Bebtelovimab Anaphylaxis    Vancomycin Angioedema and Hives    Gabapentin  Hallucinations    Ceftazidime Headache     Severe headaches after desensitization    Nsaids Other (See Comments)     Lung transplant increases risk of renal toxicity, call lung txp provider to discuss if needed       Objective :  Temp:  [97.8 °F (36.6 °C)-98.9 °F (37.2 °C)] 98.9 °F (37.2 °C)  HR:  [76-93] 76  BP: (119-146)/(66-92) 135/92  Resp:  [20-22] 20  SpO2:  [90 %-94 %] 94 %  O2 Device: None (Room air)    Physical Exam  Vitals and nursing note reviewed.   Constitutional:       General: She is not in acute distress.     Appearance: She is well-developed.   HENT:      Head: Normocephalic and atraumatic.   Eyes:      Conjunctiva/sclera: Conjunctivae normal.   Cardiovascular:      Rate and Rhythm: Normal rate and regular rhythm.      Heart sounds: No murmur heard.  Pulmonary:      Effort: Pulmonary effort is normal. No respiratory distress.      Breath sounds: Normal breath sounds. No wheezing or rales.   Abdominal:      Palpations: Abdomen is soft.      Tenderness: There is no abdominal tenderness.   Musculoskeletal:         General: No swelling.      Cervical back: Neck supple.      Right lower leg: No edema.      Left lower leg: No edema.   Skin:     General: Skin is warm and dry.      Capillary Refill: Capillary refill takes less than 2 seconds.      Findings: Erythema (Of the face) present.   Neurological:      Mental Status: She is alert. Mental status is at baseline.   Psychiatric:         Mood and Affect: Mood normal.          Lines/Drains:  Lines/Drains/Airways       Active Status       Name Placement date Placement time Site Days    Port A Cath 11/25/22 Right Chest 11/25/22  0953  Chest  786                    Central Line:  Goal for removal: Port accessed. Will de-access as appropriate.               Lab Results: I have reviewed the following results:  Results from last 7 days   Lab Units 01/19/25  1249   WBC Thousand/uL 7.53   HEMOGLOBIN g/dL 8.4*   HEMATOCRIT % 30.5*   PLATELETS Thousands/uL 157    SEGS PCT % 76*   LYMPHO PCT % 11*   MONO PCT % 11   EOS PCT % 1     Results from last 7 days   Lab Units 01/19/25  1249   SODIUM mmol/L 140   POTASSIUM mmol/L 4.4   CHLORIDE mmol/L 94*   CO2 mmol/L 39*   BUN mg/dL 38*   CREATININE mg/dL 1.33*   ANION GAP mmol/L 7   CALCIUM mg/dL 8.2*   ALBUMIN g/dL 3.5   TOTAL BILIRUBIN mg/dL 0.40   ALK PHOS U/L 70   ALT U/L 23   AST U/L 17   GLUCOSE RANDOM mg/dL 181*     Results from last 7 days   Lab Units 01/19/25  1249   INR  1.16         Lab Results   Component Value Date    HGBA1C 7.8 (H) 12/16/2024    HGBA1C 7.5 (H) 09/21/2024    HGBA1C 7.6 (H) 05/27/2024     Results from last 7 days   Lab Units 01/19/25  1555 01/19/25  1249   LACTIC ACID mmol/L 2.5* 2.7*       Imaging Results Review: I reviewed radiology reports from this admission including: CT chest.  Other Study Results Review: No additional pertinent studies reviewed.    Administrative Statements       ** Please Note: This note has been constructed using a voice recognition system. **

## 2025-01-19 NOTE — ASSESSMENT & PLAN NOTE
Complains of increased cough and pleuritic pain under her ribs.  Patient also with shortness of breath and sore throat.  The symptoms started since Friday and getting worse.  Pain regiment as needed with oxycodone and Dilaudid  Mucinex 1200 mg every 12 hours  Hycodan every 4 hours as needed for refractory cough

## 2025-01-19 NOTE — ASSESSMENT & PLAN NOTE
"Lab Results   Component Value Date    HGBA1C 7.8 (H) 12/16/2024       No results for input(s): \"POCGLU\" in the last 72 hours.    Blood Sugar Average: Last 72 hrs:  Patient currently reports that the home regimen being glargine 12 units in the morning and aspart with meals based on sugar numbers patient adjust the insulin.  Will start on Lantus 12 units in the morning and 10 units lispro with meals.  Sliding scale  Hypoglycemia protocol  Diabetic diet    "

## 2025-01-20 DIAGNOSIS — Z12.31 ENCOUNTER FOR SCREENING MAMMOGRAM FOR BREAST CANCER: Primary | ICD-10-CM

## 2025-01-20 PROBLEM — R06.89 ACUTE RESPIRATORY INSUFFICIENCY: Status: ACTIVE | Noted: 2025-01-20

## 2025-01-20 LAB
ANION GAP SERPL CALCULATED.3IONS-SCNC: 5 MMOL/L (ref 4–13)
ATRIAL RATE: 83 BPM
B PARAP IS1001 DNA NPH QL NAA+NON-PROBE: NOT DETECTED
B PERT.PT PRMT NPH QL NAA+NON-PROBE: NOT DETECTED
BUN SERPL-MCNC: 28 MG/DL (ref 5–25)
C PNEUM DNA NPH QL NAA+NON-PROBE: NOT DETECTED
CALCIUM SERPL-MCNC: 8.5 MG/DL (ref 8.4–10.2)
CHLORIDE SERPL-SCNC: 98 MMOL/L (ref 96–108)
CO2 SERPL-SCNC: 32 MMOL/L (ref 21–32)
CREAT SERPL-MCNC: 1.05 MG/DL (ref 0.6–1.3)
ERYTHROCYTE [DISTWIDTH] IN BLOOD BY AUTOMATED COUNT: 18.7 % (ref 11.6–15.1)
FLUAV RNA NPH QL NAA+NON-PROBE: NOT DETECTED
FLUBV RNA NPH QL NAA+NON-PROBE: NOT DETECTED
GFR SERPL CREATININE-BSD FRML MDRD: 63 ML/MIN/1.73SQ M
GLUCOSE SERPL-MCNC: 122 MG/DL (ref 65–140)
GLUCOSE SERPL-MCNC: 122 MG/DL (ref 65–140)
GLUCOSE SERPL-MCNC: 206 MG/DL (ref 65–140)
GLUCOSE SERPL-MCNC: 328 MG/DL (ref 65–140)
GLUCOSE SERPL-MCNC: 476 MG/DL (ref 65–140)
HADV DNA NPH QL NAA+NON-PROBE: NOT DETECTED
HCOV 229E RNA NPH QL NAA+NON-PROBE: NOT DETECTED
HCOV HKU1 RNA NPH QL NAA+NON-PROBE: NOT DETECTED
HCOV NL63 RNA NPH QL NAA+NON-PROBE: NOT DETECTED
HCOV OC43 RNA NPH QL NAA+NON-PROBE: NOT DETECTED
HCT VFR BLD AUTO: 29.2 % (ref 34.8–46.1)
HGB BLD-MCNC: 7.8 G/DL (ref 11.5–15.4)
HMPV RNA NPH QL NAA+NON-PROBE: NOT DETECTED
HPIV1 RNA NPH QL NAA+NON-PROBE: NOT DETECTED
HPIV2 RNA NPH QL NAA+NON-PROBE: NOT DETECTED
HPIV3 RNA NPH QL NAA+NON-PROBE: NOT DETECTED
HPIV4 RNA NPH QL NAA+NON-PROBE: NOT DETECTED
M PNEUMO DNA NPH QL NAA+NON-PROBE: NOT DETECTED
MCH RBC QN AUTO: 22.5 PG (ref 26.8–34.3)
MCHC RBC AUTO-ENTMCNC: 26.7 G/DL (ref 31.4–37.4)
MCV RBC AUTO: 84 FL (ref 82–98)
P AXIS: 62 DEGREES
PLATELET # BLD AUTO: 145 THOUSANDS/UL (ref 149–390)
PMV BLD AUTO: 10.3 FL (ref 8.9–12.7)
POTASSIUM SERPL-SCNC: 4.8 MMOL/L (ref 3.5–5.3)
PR INTERVAL: 136 MS
PROCALCITONIN SERPL-MCNC: <0.05 NG/ML
QRS AXIS: 65 DEGREES
QRSD INTERVAL: 66 MS
QT INTERVAL: 368 MS
QTC INTERVAL: 432 MS
RBC # BLD AUTO: 3.46 MILLION/UL (ref 3.81–5.12)
RSV RNA NPH QL NAA+NON-PROBE: NOT DETECTED
RV+EV RNA NPH QL NAA+NON-PROBE: NOT DETECTED
SARS-COV-2 RNA NPH QL NAA+NON-PROBE: NOT DETECTED
SODIUM SERPL-SCNC: 135 MMOL/L (ref 135–147)
T WAVE AXIS: 55 DEGREES
TACROLIMUS BLD-MCNC: 14.1 NG/ML (ref 3–15)
VENTRICULAR RATE: 83 BPM
WBC # BLD AUTO: 6.47 THOUSAND/UL (ref 4.31–10.16)

## 2025-01-20 PROCEDURE — 80048 BASIC METABOLIC PNL TOTAL CA: CPT

## 2025-01-20 PROCEDURE — 82948 REAGENT STRIP/BLOOD GLUCOSE: CPT

## 2025-01-20 PROCEDURE — G0545 PR INHERENT VISIT TO INPT: HCPCS | Performed by: INTERNAL MEDICINE

## 2025-01-20 PROCEDURE — 94664 DEMO&/EVAL PT USE INHALER: CPT

## 2025-01-20 PROCEDURE — 99254 IP/OBS CNSLTJ NEW/EST MOD 60: CPT | Performed by: INTERNAL MEDICINE

## 2025-01-20 PROCEDURE — 85027 COMPLETE CBC AUTOMATED: CPT

## 2025-01-20 PROCEDURE — 99233 SBSQ HOSP IP/OBS HIGH 50: CPT | Performed by: PHYSICIAN ASSISTANT

## 2025-01-20 PROCEDURE — 93010 ELECTROCARDIOGRAM REPORT: CPT | Performed by: INTERNAL MEDICINE

## 2025-01-20 PROCEDURE — 84145 PROCALCITONIN (PCT): CPT

## 2025-01-20 RX ORDER — METHOCARBAMOL 500 MG/1
500 TABLET, FILM COATED ORAL ONCE
Status: COMPLETED | OUTPATIENT
Start: 2025-01-20 | End: 2025-01-20

## 2025-01-20 RX ORDER — LIDOCAINE 50 MG/G
1 PATCH TOPICAL DAILY PRN
Status: DISCONTINUED | OUTPATIENT
Start: 2025-01-20 | End: 2025-01-28 | Stop reason: HOSPADM

## 2025-01-20 RX ORDER — INSULIN LISPRO 100 [IU]/ML
1-5 INJECTION, SOLUTION INTRAVENOUS; SUBCUTANEOUS
Status: DISCONTINUED | OUTPATIENT
Start: 2025-01-21 | End: 2025-01-20

## 2025-01-20 RX ORDER — VALACYCLOVIR HYDROCHLORIDE 500 MG/1
500 TABLET, FILM COATED ORAL EVERY 24 HOURS
Status: DISCONTINUED | OUTPATIENT
Start: 2025-01-21 | End: 2025-01-28 | Stop reason: HOSPADM

## 2025-01-20 RX ORDER — FUROSEMIDE 40 MG/1
40 TABLET ORAL DAILY
Status: DISCONTINUED | OUTPATIENT
Start: 2025-01-20 | End: 2025-01-28 | Stop reason: HOSPADM

## 2025-01-20 RX ORDER — INSULIN LISPRO 100 [IU]/ML
1-5 INJECTION, SOLUTION INTRAVENOUS; SUBCUTANEOUS
Status: DISCONTINUED | OUTPATIENT
Start: 2025-01-20 | End: 2025-01-28 | Stop reason: HOSPADM

## 2025-01-20 RX ORDER — PREDNISONE 20 MG/1
20 TABLET ORAL ONCE
Status: COMPLETED | OUTPATIENT
Start: 2025-01-20 | End: 2025-01-20

## 2025-01-20 RX ORDER — PANTOPRAZOLE SODIUM 40 MG/1
40 TABLET, DELAYED RELEASE ORAL
Status: DISCONTINUED | OUTPATIENT
Start: 2025-01-20 | End: 2025-01-28 | Stop reason: HOSPADM

## 2025-01-20 RX ADMIN — POLYETHYLENE GLYCOL 3350 17 G: 17 POWDER, FOR SOLUTION ORAL at 09:13

## 2025-01-20 RX ADMIN — AZITHROMYCIN 250 MG: 250 TABLET, FILM COATED ORAL at 09:08

## 2025-01-20 RX ADMIN — HYDROMORPHONE HYDROCHLORIDE 0.5 MG: 1 INJECTION, SOLUTION INTRAMUSCULAR; INTRAVENOUS; SUBCUTANEOUS at 06:13

## 2025-01-20 RX ADMIN — BISACODYL 5 MG: 5 TABLET, COATED ORAL at 21:45

## 2025-01-20 RX ADMIN — INSULIN GLARGINE 12 UNITS: 100 INJECTION, SOLUTION SUBCUTANEOUS at 08:37

## 2025-01-20 RX ADMIN — ALUMINUM HYDROXIDE, MAGNESIUM HYDROXIDE, AND DIMETHICONE 30 ML: 200; 20; 200 SUSPENSION ORAL at 07:31

## 2025-01-20 RX ADMIN — HYDROCODONE BITARTRATE AND HOMATROPINE METHYLBROMIDE ORAL SOLUTION 5 ML: 5; 1.5 LIQUID ORAL at 16:08

## 2025-01-20 RX ADMIN — Medication 5000 UNITS: at 09:12

## 2025-01-20 RX ADMIN — INSULIN LISPRO 3 UNITS: 100 INJECTION, SOLUTION INTRAVENOUS; SUBCUTANEOUS at 17:36

## 2025-01-20 RX ADMIN — OXYCODONE HYDROCHLORIDE 10 MG: 10 TABLET ORAL at 17:35

## 2025-01-20 RX ADMIN — PANCRELIPASE 48000 UNITS: 120000; 24000; 76000 CAPSULE, DELAYED RELEASE PELLETS ORAL at 17:39

## 2025-01-20 RX ADMIN — CEFTRIAXONE SODIUM 1000 MG: 10 INJECTION, POWDER, FOR SOLUTION INTRAVENOUS at 18:27

## 2025-01-20 RX ADMIN — HYDROMORPHONE HYDROCHLORIDE 0.5 MG: 1 INJECTION, SOLUTION INTRAMUSCULAR; INTRAVENOUS; SUBCUTANEOUS at 08:29

## 2025-01-20 RX ADMIN — PREDNISONE 20 MG: 20 TABLET ORAL at 09:10

## 2025-01-20 RX ADMIN — Medication 500 MG: at 09:46

## 2025-01-20 RX ADMIN — APIXABAN 5 MG: 5 TABLET, FILM COATED ORAL at 09:11

## 2025-01-20 RX ADMIN — PANTOPRAZOLE SODIUM 40 MG: 40 TABLET, DELAYED RELEASE ORAL at 16:08

## 2025-01-20 RX ADMIN — CYANOCOBALAMIN TAB 500 MCG 1000 MCG: 500 TAB at 09:08

## 2025-01-20 RX ADMIN — HYDROMORPHONE HYDROCHLORIDE 0.5 MG: 1 INJECTION, SOLUTION INTRAMUSCULAR; INTRAVENOUS; SUBCUTANEOUS at 18:49

## 2025-01-20 RX ADMIN — ATOVAQUONE 1500 MG: 750 SUSPENSION ORAL at 09:47

## 2025-01-20 RX ADMIN — TACROLIMUS 3 MG: 1 CAPSULE ORAL at 17:35

## 2025-01-20 RX ADMIN — HYDROCODONE BITARTRATE AND HOMATROPINE METHYLBROMIDE ORAL SOLUTION 5 ML: 5; 1.5 LIQUID ORAL at 20:20

## 2025-01-20 RX ADMIN — INSULIN LISPRO 1 UNITS: 100 INJECTION, SOLUTION INTRAVENOUS; SUBCUTANEOUS at 13:02

## 2025-01-20 RX ADMIN — PANCRELIPASE 48000 UNITS: 120000; 24000; 76000 CAPSULE, DELAYED RELEASE PELLETS ORAL at 08:33

## 2025-01-20 RX ADMIN — TACROLIMUS 3 MG: 1 CAPSULE ORAL at 09:45

## 2025-01-20 RX ADMIN — ALPRAZOLAM 1 MG: 0.5 TABLET ORAL at 22:58

## 2025-01-20 RX ADMIN — PANCRELIPASE 48000 UNITS: 120000; 24000; 76000 CAPSULE, DELAYED RELEASE PELLETS ORAL at 13:10

## 2025-01-20 RX ADMIN — POLYETHYLENE GLYCOL 3350 17 G: 17 POWDER, FOR SOLUTION ORAL at 17:36

## 2025-01-20 RX ADMIN — HYDROMORPHONE HYDROCHLORIDE 0.5 MG: 1 INJECTION, SOLUTION INTRAMUSCULAR; INTRAVENOUS; SUBCUTANEOUS at 22:48

## 2025-01-20 RX ADMIN — FAMOTIDINE 40 MG: 20 TABLET, FILM COATED ORAL at 21:45

## 2025-01-20 RX ADMIN — GUAIFENESIN 1200 MG: 600 TABLET ORAL at 20:19

## 2025-01-20 RX ADMIN — OXYCODONE HYDROCHLORIDE 10 MG: 10 TABLET ORAL at 02:39

## 2025-01-20 RX ADMIN — INSULIN LISPRO 5 UNITS: 100 INJECTION, SOLUTION INTRAVENOUS; SUBCUTANEOUS at 23:46

## 2025-01-20 RX ADMIN — OXYCODONE HYDROCHLORIDE AND ACETAMINOPHEN 250 MG: 500 TABLET ORAL at 09:10

## 2025-01-20 RX ADMIN — HYDROMORPHONE HYDROCHLORIDE 0.5 MG: 1 INJECTION, SOLUTION INTRAMUSCULAR; INTRAVENOUS; SUBCUTANEOUS at 03:33

## 2025-01-20 RX ADMIN — PREGABALIN 150 MG: 75 CAPSULE ORAL at 17:35

## 2025-01-20 RX ADMIN — DOXYCYCLINE 100 MG: 100 INJECTION, POWDER, LYOPHILIZED, FOR SOLUTION INTRAVENOUS at 08:07

## 2025-01-20 RX ADMIN — OXYBUTYNIN CHLORIDE 15 MG: 5 TABLET, EXTENDED RELEASE ORAL at 21:45

## 2025-01-20 RX ADMIN — PREDNISONE 20 MG: 20 TABLET ORAL at 16:08

## 2025-01-20 RX ADMIN — APIXABAN 5 MG: 5 TABLET, FILM COATED ORAL at 17:35

## 2025-01-20 RX ADMIN — GUAIFENESIN 1200 MG: 600 TABLET ORAL at 09:08

## 2025-01-20 RX ADMIN — VALACYCLOVIR HYDROCHLORIDE 500 MG: 500 TABLET, FILM COATED ORAL at 09:08

## 2025-01-20 RX ADMIN — OXYCODONE HYDROCHLORIDE 10 MG: 10 TABLET ORAL at 21:44

## 2025-01-20 RX ADMIN — METHOCARBAMOL 500 MG: 500 TABLET ORAL at 05:10

## 2025-01-20 RX ADMIN — HYDROMORPHONE HYDROCHLORIDE 0.5 MG: 1 INJECTION, SOLUTION INTRAMUSCULAR; INTRAVENOUS; SUBCUTANEOUS at 14:52

## 2025-01-20 RX ADMIN — OXYCODONE HYDROCHLORIDE 10 MG: 10 TABLET ORAL at 13:00

## 2025-01-20 RX ADMIN — HYDROMORPHONE HYDROCHLORIDE 0.5 MG: 1 INJECTION, SOLUTION INTRAMUSCULAR; INTRAVENOUS; SUBCUTANEOUS at 10:43

## 2025-01-20 RX ADMIN — LIDOCAINE 1 PATCH: 700 PATCH TOPICAL at 04:50

## 2025-01-20 RX ADMIN — OXYCODONE HYDROCHLORIDE 10 MG: 10 TABLET ORAL at 07:32

## 2025-01-20 RX ADMIN — BUDESONIDE AND FORMOTEROL FUMARATE DIHYDRATE 2 PUFF: 160; 4.5 AEROSOL RESPIRATORY (INHALATION) at 21:47

## 2025-01-20 RX ADMIN — PREGABALIN 150 MG: 75 CAPSULE ORAL at 09:11

## 2025-01-20 RX ADMIN — ALPRAZOLAM 1 MG: 0.5 TABLET ORAL at 00:28

## 2025-01-20 RX ADMIN — FUROSEMIDE 40 MG: 40 TABLET ORAL at 16:08

## 2025-01-20 RX ADMIN — BUDESONIDE AND FORMOTEROL FUMARATE DIHYDRATE 2 PUFF: 160; 4.5 AEROSOL RESPIRATORY (INHALATION) at 10:09

## 2025-01-20 RX ADMIN — Medication 500 MG: at 17:35

## 2025-01-20 NOTE — PROGRESS NOTES
Progress Note - Hospitalist   Name: Maximilian Mann 49 y.o. female I MRN: [id number]  Unit/Bed#: ED 11 I Date of Admission: 1/19/2025   Date of Service: 1/20/2025 I Hospital Day: 1    Assessment & Plan  Pneumonia due to infectious organism  Patient presentation due to sore throat, persistent cough, shortness of breath, wheezing, pleuritic chest pain starting since Friday.  Patient reports the symptoms are getting worse despite usual treatment with mucinex, nebulizers, OTC cough meds  CT chest appears similar to Nov '24 - no definitive infiltrate, but notes inflammatory tree-in-bud appearance  ID consulted for guidance due to transplant status and recurrence of symptoms  Procalcitonin normal x2 (was elevated Nov '24)  FLU/COVID/RSV/RP2 all negative   Continue respiratory protocol, supplemental O2 prn  Mucinex, albuterol, hycodan prn  For pleuritic chest pain oxycodone or dilaudid IV  Type 1 diabetes mellitus with diabetic chronic kidney disease, unspecified CKD stage ([facility])  Blood Sugar Average: Last 72 hrs:(P) 179  Continue Lantus 12 units in the morning   Continue humalog 10 units with meals  Sliding scale coverage   Hypoglycemia protocol  Diabetic diet  Pancreatic insufficiency  Continue Creon  GEOFF (acute kidney injury) ([facility])  Cr 1.3 on admission, baseline Cr 0.8-1.1  Suspected prerenal due to possible acute viral illness?   Cr improved with IV fluids, will hold on further due to patient swelling/fluid retention  Restart lasix   BMP in am  GERD (gastroesophageal reflux disease)  History of severe GERD status post fundoplication in 2023   Patient currently taking dexlansoprazole 60 mg daily and famotidine 40 mg daily at bedtime  Maalox ordered as needed for acid reflux  Add additional pantoprazole 40 mg am  Hx of lung transplant ([facility])  Followed by YAYA Mcdonnell, secondary to cystic fibrosis   s/p bilat lung transplant 2013, subsequent rejection and severe bronchiolitis obliterans   s/p second left lung transplant  2017   s/p right pneumonectomy  Has had recurrent admissions for pneumonia and Pseudomonas/MSSA   Patient currently taking tacrolimus 3 mg twice a day and prednisone 20 mg daily  Patient takes for prophylaxis azithromycin 250 mg 3 times a week Monday Wednesday Friday, atovaquone 1500 milligrams daily for PJP prophylaxis and Valtrex 500 mg daily  History of DVT (deep vein thrombosis)  Continue with Eliquis 5 mg twice a day.  Pleuritic chest pain  Mucinex, oxycodone, dilaudid, hycodan  Lactic acidosis  Elevated lactic acid 2.7 and improved with IV fluids to 2.5, suspect type B lactic acid elevation secondary to albuterol  No evidence of end-organ damage (barely meets GEOFF criteria, no definitive infection)    VTE Pharmacologic Prophylaxis: VTE Score: 3 Moderate Risk (Score 3-4) - Pharmacological DVT Prophylaxis Ordered: apixaban (Eliquis).    Mobility:      Not assessed    Patient Centered Rounds: I evaluated the patient without nursing staff present due to d/w ER nurses    Discussions with Specialists or Other Care Team Provider: ID    Education and Discussions with Family / Patient: Patient declined call to .  Reports daughter is home sick,  might visit later today    Current Length of Stay: 1 day(s)  Current Patient Status: Inpatient   Certification Statement: The patient will continue to require additional inpatient hospital stay due to respiratory issues, cough, chest pain/pleuritic pain  Discharge Plan:  tbd - pending clinical improvement     Code Status: Level 1 - Full Code    Subjective   Patient reports continued cough, chest pain, shortness of breath.  No subjective fevers.  She has facial swelling for which she takes Lasix as needed.  Patient reports daughter at home sick as well with respiratory symptoms.  No travel or sick contacts otherwise.    Objective :  Temp:  [97.8 °F (36.6 °C)-98.9 °F (37.2 °C)] 98.9 °F (37.2 °C)  HR:  [60-93] 71  BP: (115-147)/() 145/90  Resp:  [18-22]  18  SpO2:  [86 %-98 %] 96 %  O2 Device: None (Room air)  Nasal Cannula O2 Flow Rate (L/min):  [2 L/min] 2 L/min    There is no height or weight on file to calculate BMI.     Input and Output Summary (last 24 hours):   No intake or output data in the 24 hours ending 01/20/25 0756    Physical Exam  Vitals and nursing note reviewed.   Constitutional:       General: She is awake.      Appearance: Normal appearance. She is not ill-appearing or toxic-appearing.   Cardiovascular:      Rate and Rhythm: Normal rate and regular rhythm.      Heart sounds: Normal heart sounds.   Pulmonary:      Effort: Tachypnea and accessory muscle usage present. No respiratory distress.      Breath sounds: Examination of the left-upper field reveals rhonchi. Examination of the left-middle field reveals rhonchi. Examination of the left-lower field reveals rhonchi. Rhonchi present. No wheezing.      Comments: Absent right lung sounds - s/p pneumonectomy   Abdominal:      General: Bowel sounds are normal. There is no distension.      Palpations: Abdomen is soft.      Tenderness: There is no abdominal tenderness.   Musculoskeletal:         General: Swelling (facial plethora) present.   Skin:     General: Skin is warm and dry.      Coloration: Skin is not pale.   Neurological:      Mental Status: She is alert and oriented to person, place, and time.   Psychiatric:         Mood and Affect: Mood normal.         Behavior: Behavior normal. Behavior is cooperative.           Lines/Drains:  Lines/Drains/Airways       Active Status       Name Placement date Placement time Site Days    Port A Cath 11/25/22 Right Chest 11/25/22  0953  Chest  786                    Central Line:  Goal for removal: Port accessed. Will de-access as appropriate.               Lab Results: I have reviewed the following results:   Results from last 7 days   Lab Units 01/20/25  0442 01/19/25  1249   WBC Thousand/uL 6.47 7.53   HEMOGLOBIN g/dL 7.8* 8.4*   HEMATOCRIT % 29.2* 30.5*    PLATELETS Thousands/uL 145* 157   SEGS PCT %  --  76*   LYMPHO PCT %  --  11*   MONO PCT %  --  11   EOS PCT %  --  1     Results from last 7 days   Lab Units 01/20/25  0442 01/19/25  1249   SODIUM mmol/L 135 140   POTASSIUM mmol/L 4.8 4.4   CHLORIDE mmol/L 98 94*   CO2 mmol/L 32 39*   BUN mg/dL 28* 38*   CREATININE mg/dL 1.05 1.33*   ANION GAP mmol/L 5 7   CALCIUM mg/dL 8.5 8.2*   ALBUMIN g/dL  --  3.5   TOTAL BILIRUBIN mg/dL  --  0.40   ALK PHOS U/L  --  70   ALT U/L  --  23   AST U/L  --  17   GLUCOSE RANDOM mg/dL 122 181*     Results from last 7 days   Lab Units 01/19/25  1249   INR  1.16     Results from last 7 days   Lab Units 01/20/25  0730 01/19/25  2104   POC GLUCOSE mg/dl 122 236*         Results from last 7 days   Lab Units 01/20/25  0442 01/19/25  1555 01/19/25  1249   LACTIC ACID mmol/L  --  2.5* 2.7*   PROCALCITONIN ng/ml <0.05  --  0.06       Recent Cultures (last 7 days):         Imaging Results Review: I personally reviewed the following image studies in PACS and associated radiology reports: CT chest. My interpretation of the radiology images/reports is: tree-in-bud.  Other Study Results Review: No additional pertinent studies reviewed.    Last 24 Hours Medication List:     Current Facility-Administered Medications:     acetaminophen (TYLENOL) tablet 975 mg, Q6H PRN    ALPRAZolam (XANAX) tablet 1 mg, HS PRN    aluminum-magnesium hydroxide-simethicone (MAALOX) oral suspension 30 mL, Q6H PRN    apixaban (ELIQUIS) tablet 5 mg, BID    ascorbic acid (VITAMIN C) tablet 250 mg, Daily    atovaquone (MEPRON) oral suspension 1,500 mg, Daily    azithromycin (ZITHROMAX) tablet 250 mg, Once per day on Monday Wednesday Friday    bisacodyl (DULCOLAX) EC tablet 5 mg, HS    budesonide-formoterol (SYMBICORT) 160-4.5 mcg/act inhaler 2 puff, Q12H    ceftriaxone (ROCEPHIN) 1 g/50 mL in dextrose IVPB, Q24H    Cholecalciferol (VITAMIN D3) tablet 5,000 Units, Daily    cyanocobalamin (VITAMIN B-12) tablet 1,000 mcg,  Daily    doxycycline (VIBRAMYCIN) 100 mg in sodium chloride 0.9 % 100 mL IVPB, Q12H, Last Rate: Stopped (01/19/25 2300)    famotidine (PEPCID) tablet 40 mg, HS    guaiFENesin (MUCINEX) 12 hr tablet 1,200 mg, Q12H DOTTY    HYDROcodone Bit-Homatrop MBr (HYCODAN) oral syrup 5 mL, Q4H PRN    HYDROmorphone (DILAUDID) injection 0.5 mg, Q2H PRN    insulin glargine (LANTUS) subcutaneous injection 12 Units 0.12 mL, QAM    insulin lispro (HumALOG/ADMELOG) 100 units/mL subcutaneous injection 1-5 Units, TID AC **AND** Fingerstick Glucose (POCT), 4x Daily AC and at bedtime    ipratropium-albuterol (DUO-NEB) 0.5-2.5 mg/3 mL inhalation solution 3 mL, Q6H PRN    lidocaine (LIDODERM) 5 % patch 1 patch, Daily PRN    magnesium gluconate (MAGONATE) tablet 500 mg, BID    naloxone (NARCAN) 0.04 mg/mL syringe 0.04 mg, Q1MIN PRN    ondansetron (ZOFRAN) injection 4 mg, Q6H PRN    oxybutynin (DITROPAN-XL) 24 hr tablet 15 mg, HS    oxyCODONE (ROXICODONE) IR tablet 5 mg, Q4H PRN **OR** oxyCODONE (ROXICODONE) immediate release tablet 10 mg, Q4H PRN    pancrelipase (Lip-Prot-Amyl) (CREON) delayed release capsule 48,000 Units, TID With Meals    pantoprazole (PROTONIX) EC tablet 40 mg, HS    polyethylene glycol (MIRALAX) packet 17 g, BID    predniSONE tablet 20 mg, Daily    pregabalin (LYRICA) capsule 150 mg, BID    tacrolimus (PROGRAF) capsule 3 mg, BID    valACYclovir (VALTREX) tablet 500 mg, Daily    Administrative Statements   Today, Patient Was Seen By: Yolanda Ferrara PA-C  I have spent a total time of 60 minutes in caring for this patient on the day of the visit/encounter including Risks and benefits of tx options, Patient and family education, Impressions, Counseling / Coordination of care, Documenting in the medical record, Reviewing / ordering tests, medicine, procedures  , and Communicating with other healthcare professionals .    **Please Note: This note may have been constructed using a voice recognition system.**

## 2025-01-20 NOTE — ASSESSMENT & PLAN NOTE
Consider secondary to recent poor oral intake as improved with IVF's.   Volume management per primary service   Check BMP in AM

## 2025-01-20 NOTE — ASSESSMENT & PLAN NOTE
Lab Results   Component Value Date    HGBA1C 7.8 (H) 12/16/2024       Recent Labs     01/19/25  2104 01/20/25  0730 01/20/25  1219   POCGLU 236* 122 206*     With recent hemoglobin A1c of 7.8.  Potential risk factor for infection.  Blood sugar control per primary service.

## 2025-01-20 NOTE — ASSESSMENT & PLAN NOTE
Elevated lactic acid 2.7 and improved with IV fluids to 2.5, suspect type B lactic acid elevation secondary to albuterol  No evidence of end-organ damage (barely meets GEOFF criteria, no definitive infection)

## 2025-01-20 NOTE — CONSULTS
Consultation - Infectious Disease   Name: Maximilian Mann 49 y.o. female I MRN: [id number]  Unit/Bed#: ED 11 I Date of Admission: 1/19/2025   Date of Service: 1/20/2025 I Hospital Day: 1   Inpatient consult to Infectious Diseases  Consult performed by: Vesta Galindo DO  Consult ordered by: Anna Coats MD        Physician Requesting Evaluation: Gabriel Littlejohn DO   Reason for Evaluation / Principal Problem: SIRS    Assessment & Plan  SIRS (systemic inflammatory response syndrome) (HCC)  Mild tachycardia, tachypnea.  No fevers, leukocytosis.  Normal procalcitonin x 2.  Consider viral process based on patient's constellation of symptoms.  RP 2 is negative.  No new findings on chest CT, as below.  Seems to be clinically improving.  Admission blood cultures are still pending.   Antibiotic plan as below   Follow-up blood cultures   Follow temperatures and hemodynamics   Follow symptomatology   Recheck CBC in AM to assess potential infection  Acute respiratory insufficiency  Patient presented with progressively worsening respiratory symptoms.  No fevers, leukocytosis, or procalcitonin elevation although we may not see such abnormalities given patient's immunosuppressed state.  Consider possibility of microaspiration in the setting of significantly dilated esophagus.  Less likely new pulmonary infection as current CT chest remains stable in comparison to prior CT from 2 months ago.  Patient has known history of chronic airway colonization including GBS, MRSA.  Sputum culture was sent but appears to be a poor sample.  Symptoms seem to be improving   Continue IV ceftriaxone for now   Hold doxycycline as low clinical suspicion for MRSA pneumonia and may exacerbate GERD symptoms.   Follow respiratory symptoms and O2 requirements  GEOFF (acute kidney injury) (HCC)  Consider secondary to recent poor oral intake as improved with IVF's.   Volume management per primary service   Check BMP in AM  Hx of lung transplant  (HCC)  Patient previously underwent double lung transplant in 2013. She required retransplant of the left lung in 2017. Course was then complicated by need for right-sided pneumonectomy in 2022 given chronic progressive infections. She remains on regimen with tacrolimus and prednisone. Prophylactic medications and dosing reviewed.    Continue atovaquone, Valtrex, azithromycin prophylaxis   Close follow-up with transplant center  Type 1 diabetes mellitus with diabetic chronic kidney disease, unspecified CKD stage (HCC)  Lab Results   Component Value Date    HGBA1C 7.8 (H) 12/16/2024       Recent Labs     01/19/25  2104 01/20/25  0730 01/20/25  1219   POCGLU 236* 122 206*     With recent hemoglobin A1c of 7.8.  Potential risk factor for infection.  Blood sugar control per primary service.  Pancreatic insufficiency  On Creon chronically.  GERD (gastroesophageal reflux disease)  Patient has gastroparesis with poorly controlled GERD.  She did have previous fundoplication in 2023.  CT again with dilated esophagus.  Of note, doxycycline has previously exacerbated the symptoms.   Ongoing management per primary service      Antibiotics:  Ceftriaxone/doxycycline/azithromycin  Atovaquone/azithromycin/valacyclovir prophylaxis    I personally reviewed prior medical records in detail.  Above plan was discussed with primary service.  Thank you for the consultation.  Will continue to follow the patient.        History of Present Illness   Maximilian Mann is a 49 y.o. 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 abscessus/boletti colonization, right pneumonectomy 10/2022 with removal of purulent lung at [facility], prior history of MRSA and Pseudomonas pneumonia.  Patient is chronically maintained on atovaquone, azithromycin and Valtrex for prophylaxis managed by transplant center.  Patient was also  previously on posaconazole prophylaxis.  More recently patient was hospitalized in November 2024 for worsening respiratory symptoms.  Sputum culture isolated GBS and Candida albicans suspicious for colonization.  Patient completed 7-day course of ceftriaxone.    She presented overnight to the ER with complaint of worsening shortness of breath, cough, sore throat, generalized weakness, pleuritic chest discomfort.  She was mildly tachycardic and tachypneic on presentation.  No leukocytosis.  No fevers. Lactic acid was 2.7.  Creatinine was elevated at 1.3.  RP 2 was negative.  CT chest showed persistent but stable left lung transplant tree-in-bud nodularity compared to prior CT from November 2024.    A complete review of systems is negative other than that noted in the HPI.    I have reviewed the patient's PMH, PSH, Social History, Family History, Meds, and Allergies  Historical Information   Past Medical History:   Diagnosis Date    Abnormal uterine bleeding (AUB) 7/7/2022    ABPA (allergic bronchopulmonary aspergillosis) (HCC) 07/08/2012    Acute kidney injury (HCC) 09/08/2017    Acute on chronic respiratory failure ([facility]) 03/04/2017    Last Assessment & Plan:  Formatting of this note might be different from the original. Recurrent with clinical worsening with enlarging PTX, s/p multiple chest tubes, but new fevers 6/19 and worsening leukocytosis indicating infection as cause of 6/19 decompensation with profound hypoxia in setting of shunting through right lung and continued pneumothorax s/p 2nd chest tube placement.  6/27: Right    Anaphylaxis 06/06/2022    Anemia     Asthma     Chest pain 09/21/2021    Chronic pain     Chronic respiratory failure with hypoxia (HCC) 09/07/2021    COVID-19 06/03/2022    Cystic fibrosis (HCC)     Deep vein thrombosis (HCC)     Diabetes mellitus (HCC)     Fever 09/22/2024    GERD (gastroesophageal reflux disease)     HBP (high blood pressure)     HCAP (healthcare-associated pneumonia)  03/06/2020    History of transfusion     Influenza B 02/23/2020    Kidney stone     Moderate protein-calorie malnutrition (HCC) 01/08/2022    Neutropenic fever  (HCC) 09/26/2020    Pneumonia due to Pseudomonas species ([facility]) 10/28/2010    Last Assessment & Plan:  Formatting of this note might be different from the original. Polymicrobial pna w/ pseudomonas, M. Abscessus, Cadida glabrata/albicans    PONV (postoperative nausea and vomiting)     Pseudomonas aeruginosa infection 09/20/2012    Renal calculus     Squamous cell skin cancer 01/12/2023    SCCIS- Left upper thigh    Transplant recipient     Lung- 2013, 2017    Ulcerative colitis (HCC)     Wellness examination 02/04/2013    Formatting of this note might be different from the original. Discussed with patient and acknowledgement form signed on 2/4/2013 (ALYSHA)     Past Surgical History:   Procedure Laterality Date    APPENDECTOMY      AUGMENTATION MAMMAPLASTY Bilateral 2004    AUGMENTATION MAMMAPLASTY Right 2021    removed     BREAST SURGERY      Aug.     BRONCHOSCOPY      CHOLECYSTECTOMY      COLONOSCOPY      EGD  07/2019    Yeast Infect.     FL RETROGRADE PYELOGRAM  01/09/2020    FL RETROGRADE PYELOGRAM  02/04/2020    FL RETROGRADE PYELOGRAM  9/20/2024    GASTROSCOPY  03/01/2024    GASTROSTOMY TUBE, PLACE      IR BALLOON-OCCLUDED ANTEGRADE TRANSVENOUS OBLITERATION (BATO)  11/22/2022    IR PORT PLACEMENT  03/22/2022    IR PORT REMOVAL  01/10/2022    IR PORT STRIPPING  11/25/2022    IR TUNNELED CENTRAL LINE PLACEMENT  01/13/2022    LUNG SURGERY      LUNG TRANSPLANT      X2    OK CYSTO/URETERO W/LITHOTRIPSY &INDWELL STENT INSRT Right 02/04/2020    Procedure: CYSTOSCOPY URETEROSCOPY WITH LITHOTRIPSY HOLMIUM LASER, RETROGRADE PYELOGRAM AND INSERTION STENT URETERAL;  Surgeon: Patricio Perry MD;  Location: [facility];  Service: Urology    OK CYSTO/URETERO W/LITHOTRIPSY &INDWELL STENT INSRT Left 9/20/2024    Procedure: CYSTOSCOPY URETEROSCOPY WITH LITHOTRIPSY  HOLMIUM LASER, RETROGRADE PYELOGRAM AND INSERTION STENT URETERAL, BASKET STONE EXTRACTION;  Surgeon: Lee Briceno MD;  Location: [facility];  Service: Urology    PA CYSTOURETHROSCOPY W/URETERAL CATHETERIZATION Right 2020    Procedure: CYSTOSCOPY RETROGRADE PYELOGRAM WITH INSERTION STENT URETERAL;  Surgeon: Patricio Perry MD;  Location: [facility];  Service: Urology    SKIN BIOPSY      US GUIDED VASCULAR ACCESS  2017     Social History     Tobacco Use    Smoking status: Former     Current packs/day: 0.00     Average packs/day: 0.5 packs/day for 12.0 years (6.0 ttl pk-yrs)     Types: Cigarettes     Start date: 1993     Quit date: 2005     Years since quittin.0     Passive exposure: Past    Smokeless tobacco: Never   Vaping Use    Vaping status: Never Used   Substance and Sexual Activity    Alcohol use: Yes     Alcohol/week: 2.0 standard drinks of alcohol     Types: 2 Glasses of wine per week     Comment: social    Drug use: Yes     Frequency: 2.0 times per week     Types: Marijuana     Comment: medical edible marijuana prescribed    Sexual activity: Yes     Partners: Male     Birth control/protection: None     E-Cigarette/Vaping    E-Cigarette Use Never User      E-Cigarette/Vaping Substances    Nicotine No     THC No     CBD No     Flavoring No     Other No     Unknown No        Social History     Tobacco Use    Smoking status: Former     Current packs/day: 0.00     Average packs/day: 0.5 packs/day for 12.0 years (6.0 ttl pk-yrs)     Types: Cigarettes     Start date: 1993     Quit date: 2005     Years since quittin.0     Passive exposure: Past    Smokeless tobacco: Never   Vaping Use    Vaping status: Never Used   Substance and Sexual Activity    Alcohol use: Yes     Alcohol/week: 2.0 standard drinks of alcohol     Types: 2 Glasses of wine per week     Comment: social    Drug use: Yes     Frequency: 2.0 times per week     Types: Marijuana     Comment: medical edible  marijuana prescribed    Sexual activity: Yes     Partners: Male     Birth control/protection: None       Current Facility-Administered Medications:     acetaminophen (TYLENOL) tablet 975 mg, Q6H PRN    ALPRAZolam (XANAX) tablet 1 mg, HS PRN    aluminum-magnesium hydroxide-simethicone (MAALOX) oral suspension 30 mL, Q6H PRN    apixaban (ELIQUIS) tablet 5 mg, BID    ascorbic acid (VITAMIN C) tablet 250 mg, Daily    atovaquone (MEPRON) oral suspension 1,500 mg, Daily    azithromycin (ZITHROMAX) tablet 250 mg, Once per day on Monday Wednesday Friday    bisacodyl (DULCOLAX) EC tablet 5 mg, HS    budesonide-formoterol (SYMBICORT) 160-4.5 mcg/act inhaler 2 puff, Q12H    ceftriaxone (ROCEPHIN) 1 g/50 mL in dextrose IVPB, Q24H    Cholecalciferol (VITAMIN D3) tablet 5,000 Units, Daily    cyanocobalamin (VITAMIN B-12) tablet 1,000 mcg, Daily    doxycycline (VIBRAMYCIN) 100 mg in sodium chloride 0.9 % 100 mL IVPB, Q12H, Last Rate: Stopped (01/20/25 1014)    famotidine (PEPCID) tablet 40 mg, HS    guaiFENesin (MUCINEX) 12 hr tablet 1,200 mg, Q12H DOTTY    HYDROcodone Bit-Homatrop MBr (HYCODAN) oral syrup 5 mL, Q4H PRN    HYDROmorphone (DILAUDID) injection 0.5 mg, Q2H PRN    insulin glargine (LANTUS) subcutaneous injection 12 Units 0.12 mL, QAM    insulin lispro (HumALOG/ADMELOG) 100 units/mL subcutaneous injection 1-5 Units, TID AC **AND** Fingerstick Glucose (POCT), 4x Daily AC and at bedtime    ipratropium-albuterol (DUO-NEB) 0.5-2.5 mg/3 mL inhalation solution 3 mL, Q6H PRN    lidocaine (LIDODERM) 5 % patch 1 patch, Daily PRN    magnesium gluconate (MAGONATE) tablet 500 mg, BID    naloxone (NARCAN) 0.04 mg/mL syringe 0.04 mg, Q1MIN PRN    ondansetron (ZOFRAN) injection 4 mg, Q6H PRN    oxybutynin (DITROPAN-XL) 24 hr tablet 15 mg, HS    oxyCODONE (ROXICODONE) IR tablet 5 mg, Q4H PRN **OR** oxyCODONE (ROXICODONE) immediate release tablet 10 mg, Q4H PRN    pancrelipase (Lip-Prot-Amyl) (CREON) delayed release capsule 48,000 Units,  TID With Meals    pantoprazole (PROTONIX) EC tablet 40 mg, HS    polyethylene glycol (MIRALAX) packet 17 g, BID    predniSONE tablet 20 mg, Daily    pregabalin (LYRICA) capsule 150 mg, BID    tacrolimus (PROGRAF) capsule 3 mg, BID    valACYclovir (VALTREX) tablet 500 mg, Daily  Prior to Admission Medications   Prescriptions Last Dose Informant Patient Reported? Taking?   ALPRAZolam (XANAX) 1 mg tablet 2025 Bedtime  No Yes   Sig: Take 1 tablet (1 mg total) by mouth daily at bedtime as needed for anxiety or sleep   Ascorbic Acid (vitamin C) 100 MG tablet 2025 Morning Self Yes Yes   Sig: Take 100 mg by mouth daily   BD Pen Needle Abi U/F 32G X 4 MM MISC 2025 Self No Yes   Si times daily   Cholecalciferol (Vitamin D3) 125 MCG (5000 UT) TABS 2025 Morning Self Yes Yes   Sig: Take 5,000 Units by mouth daily   Continuous Blood Gluc  (Dexcom G7 ) SUKHI 2025 Morning Self No Yes   Sig: Use 1 each continuous   Continuous Glucose Sensor (Dexcom G7 Sensor) 2025  No Yes   Sig: Use 1 Device every 10 days   Insulin Disposable Pump (Omnipod 5 G6 Intro, Gen 5,) KIT Unknown Self No No   Sig: Use 1 Units continuous   Insulin Disposable Pump (Omnipod 5 G6 Pods, Gen 5,) MISC Unknown Self No No   Sig: Use 1 Units every other day   Insulin Pen Needle (Comfort EZ Pen Needles) 33G X 4 MM MISC 2025  No Yes   Sig: Use to inject insulin 4 times a day   Respiratory Therapy Supplies (Nebulizer) SUKHI 2025 Self Yes Yes   Sig: Please dispense nebulizer machine   Turmeric 500 MG CAPS 2025 Evening Self Yes Yes   Sig: Take by mouth   acetaminophen (TYLENOL) 325 mg tablet 2025 Morning Self No Yes   Sig: Take 3 tablets (975 mg total) by mouth every 8 (eight) hours   albuterol (2.5 mg/3 mL) 0.083 % nebulizer solution 2025 Self No Yes   Sig: Take 6 mL (5 mg total) by nebulization 2 (two) times a day   albuterol (PROVENTIL HFA,VENTOLIN HFA) 90 mcg/act inhaler 2025 Self Yes Yes    Sig: INHALE 2 PUFFS EVERY 8 HOURS AS NEEDED FOR SHORTNESS OF BREATH OR WHEEZING.   apixaban (ELIQUIS) 5 mg 1/19/2025 Morning Self No Yes   Sig: Take 1 tablet (5 mg total) by mouth 2 (two) times a day   atovaquone (MEPRON) 750 mg/5 mL suspension 1/19/2025 Morning Self Yes Yes   Sig: Take 1,500 mg by mouth daily   azelastine (OPTIVAR) 0.05 % ophthalmic solution   No No   Sig: INSTILL 1 DROP INTO BOTH EYES TWICE A DAY   azithromycin (ZITHROMAX) 250 mg tablet Past Week Self Yes Yes   Sig: Take 250 mg by mouth 3 (three) times a week   bisacodyl (DULCOLAX) 5 mg EC tablet Past Week Self No Yes   Sig: Take 1 tablet (5 mg total) by mouth daily at bedtime   budesonide-formoterol (SYMBICORT) 160-4.5 mcg/act inhaler 1/19/2025 Morning Self Yes Yes   Sig: Inhale 2 puffs every 12 (twelve) hours   cyclobenzaprine (FLEXERIL) 5 mg tablet   No No   Sig: Take 1 tablet (5 mg total) by mouth 3 (three) times a day as needed for muscle spasms for up to 14 days   dexlansoprazole (DEXILANT) 60 MG capsule 1/18/2025 Bedtime Self No Yes   Sig: TAKE 1 CAPSULE BY MOUTH EVERY DAY IN THE MORNING   dicyclomine (BENTYL) 10 mg capsule Not Taking  No No   Sig: Take 2 capsules (20 mg total) by mouth 4 (four) times a day (before meals and at bedtime)   Patient not taking: Reported on 1/19/2025   dronabinol (MARINOL) 5 MG capsule Past Week Self No Yes   Sig: Take 1 capsule (5 mg total) by mouth 3 (three) times a day   famotidine (PEPCID) 40 MG tablet 1/18/2025 Bedtime  No Yes   Sig: TAKE 1 TABLET BY MOUTH EVERYDAY AT BEDTIME   furosemide (LASIX) 20 mg tablet 1/19/2025 Morning Self Yes Yes   Sig: Take 40 mg by mouth daily   insulin aspart (NovoLOG FlexPen) 100 UNIT/ML injection pen 1/19/2025 Morning  No Yes   Sig: INJECT 10 UNITS pre-BREAKFAST, 8 UNITS pre-LUNCH and 12 UNITS pre-DINNER plus scale; max 50 UNITS/day   insulin degludec (Tresiba FlexTouch) 100 units/mL injection pen 1/19/2025 Morning  No Yes   Sig: Inject 12 Units under the skin daily    magnesium gluconate (MAGONATE) 500 mg tablet 1/19/2025  Yes Yes   Sig: Take 500 mg by mouth 2 (two) times a day   naloxone (NARCAN) 4 mg/0.1 mL nasal spray Unknown  No No   Sig: Administer 1 spray into a nostril. If no response after 2-3 minutes, give another dose in the other nostril using a new spray.   ondansetron (ZOFRAN) 4 mg tablet Past Week Self Yes Yes   Sig: Take 4 mg by mouth every 8 (eight) hours as needed for nausea   oxyCODONE (ROXICODONE) 10 MG TABS 1/18/2025  No Yes   Sig: Take 1 tablet (10 mg total) by mouth every 4 (four) hours as needed for moderate pain or severe pain Max Daily Amount: 60 mg   oxybutynin (DITROPAN XL) 15 MG 24 hr tablet 1/18/2025 Bedtime Self No Yes   Sig: Take 1 tablet (15 mg total) by mouth daily at bedtime   pancrelipase, Lip-Prot-Amyl, (CREON) 24,000 units 1/18/2025 Self Yes Yes   Sig: Take 48,000 Units by mouth 3 (three) times a day with meals   pantoprazole (PROTONIX) 40 mg tablet   No No   Sig: Take 1 tablet (40 mg total) by mouth 2 (two) times a day before meals   polyethylene glycol (MIRALAX) 17 g packet Past Week Self No Yes   Sig: Take 17 g by mouth 2 (two) times a day   predniSONE 10 mg tablet 1/19/2025 Morning Self Yes Yes   Sig: Take 20 mg by mouth daily   pregabalin (LYRICA) 150 mg capsule 1/19/2025 Morning Self No Yes   Sig: Take 1 capsule (150 mg total) by mouth 2 times a day   promethazine-codeine (PHENERGAN WITH CODEINE) 6.25-10 mg/5 mL syrup Past Month  No Yes   Sig: Take 2.5 mL by mouth every 6 (six) hours as needed for cough   tacrolimus (PROGRAF) 1 mg capsule 1/19/2025 Morning Self No Yes   Sig: Take 2 capsules (2 mg total) by mouth 2 (two) times a day   Patient taking differently: Take 3 mg by mouth 2 (two) times a day   valACYclovir (VALTREX) 500 mg tablet 1/18/2025 Bedtime Self Yes Yes   Sig: Take 500 mg by mouth daily   vitamin B-12 (VITAMIN B-12) 1,000 mcg tablet 1/19/2025 Morning Self Yes Yes   Sig: Take by mouth daily      Facility-Administered  Medications: None     Bebtelovimab, Vancomycin, Gabapentin, Ceftazidime, and Nsaids    Objective :  HR:  [60-83] 64  BP: (113-147)/() 116/63  Resp:  [16-21] 18  SpO2:  [86 %-98 %] 98 %  O2 Device: None (Room air)  Nasal Cannula O2 Flow Rate (L/min):  [2 L/min] 2 L/min    General: Nontoxic, awake and alert  Eyes:  Conjunctive clear with no hemorrhages or effusions  Oropharynx:  No visible oral lesions  Neck: Trachea midline  Lungs: Coarse left-sided breath sounds  Cardiac:  Regular rate and rhythm, no murmurs  Abdomen:  Soft, non-tender, non-distented  Extremities:  No peripheral cyanosis, clubbing, or edema  Skin:  No rashes, no ulcers  Neurological:  Moves all four extremities spontaneously      Lab Results: I have reviewed the following results:  Results from last 7 days   Lab Units 01/20/25  0442 01/19/25  1249   WBC Thousand/uL 6.47 7.53   HEMOGLOBIN g/dL 7.8* 8.4*   PLATELETS Thousands/uL 145* 157     Results from last 7 days   Lab Units 01/20/25  0442 01/19/25  1249   SODIUM mmol/L 135 140   POTASSIUM mmol/L 4.8 4.4   CHLORIDE mmol/L 98 94*   CO2 mmol/L 32 39*   BUN mg/dL 28* 38*   CREATININE mg/dL 1.05 1.33*   EGFR ml/min/1.73sq m 63 47   CALCIUM mg/dL 8.5 8.2*   AST U/L  --  17   ALT U/L  --  23   ALK PHOS U/L  --  70   ALBUMIN g/dL  --  3.5     Results from last 7 days   Lab Units 01/19/25  1556 01/19/25  1254 01/19/25  1249   BLOOD CULTURE   --  Received in Microbiology Lab. Culture in Progress. Received in Microbiology Lab. Culture in Progress.   GRAM STAIN RESULT  2+ Epithelial cells per low power field*  2+ Polys*  2+ Gram positive cocci in pairs*  2+ Budding yeast*  --   --      Results from last 7 days   Lab Units 01/20/25  0442 01/19/25  1249   PROCALCITONIN ng/ml <0.05 0.06             Results from last 7 days   Lab Units 01/19/25  1249   D-DIMER QUANTITATIVE ug/ml FEU 1.12*       Imaging Results Review: I personally reviewed the following image studies in PACS and associated radiology  reports: CT chest. My interpretation of the radiology images/reports is: No PE.  Left lung transplant demonstrating compensatory hyperinflation and diffuse tree-in-bud nodularity similar to prior study.  Status post right pneumonectomy with shifting of mediastinum to the right and dilated esophagus likely related to resultant mass effect at the GE junction..

## 2025-01-20 NOTE — ASSESSMENT & PLAN NOTE
Patient has gastroparesis with poorly controlled GERD.  She did have previous fundoplication in 2023.  CT again with dilated esophagus.  Of note, doxycycline has previously exacerbated the symptoms.   Ongoing management per primary service

## 2025-01-20 NOTE — ASSESSMENT & PLAN NOTE
History of severe GERD status post fundoplication in 2023   Patient currently taking dexlansoprazole 60 mg daily and famotidine 40 mg daily at bedtime  Maalox ordered as needed for acid reflux  Add additional pantoprazole 40 mg am

## 2025-01-20 NOTE — ASSESSMENT & PLAN NOTE
Patient presented with progressively worsening respiratory symptoms.  No fevers, leukocytosis, or procalcitonin elevation although we may not see such abnormalities given patient's immunosuppressed state.  Consider possibility of microaspiration in the setting of significantly dilated esophagus.  Less likely new pulmonary infection as current CT chest remains stable in comparison to prior CT from 2 months ago.  Patient has known history of chronic airway colonization including GBS, MRSA.  Sputum culture was sent but appears to be a poor sample.  Symptoms seem to be improving   Continue IV ceftriaxone for now   Hold doxycycline as low clinical suspicion for MRSA pneumonia and may exacerbate GERD symptoms.   Follow respiratory symptoms and O2 requirements

## 2025-01-20 NOTE — UTILIZATION REVIEW
"Initial Clinical Review    Admission: Date/Time/Statement:   Admission Orders (From admission, onward)       Ordered        01/19/25 1718  INPATIENT ADMISSION  Once                          Orders Placed This Encounter   Procedures    INPATIENT ADMISSION     Standing Status:   Standing     Number of Occurrences:   1     Level of Care:   Med Surg [16]     Estimated length of stay:   More than 2 Midnights     Certification:   I certify that inpatient services are medically necessary for this patient for a duration of greater than two midnights. See H&P and MD Progress Notes for additional information about the patient's course of treatment.     ED Arrival Information       Expected   -    Arrival   1/19/2025 11:47    Acuity   Emergent              Means of arrival   Walk-In    Escorted by   Spouse    Service   Hospitalist    Admission type   Emergency              Arrival complaint   CHEST PAIN (LUNG TRANSPLANT PT)             Chief Complaint   Patient presents with    Chest Pain     Pt c/o chest pain \"all over\" and increased SOB x 3 days, lung transplant pt       Initial Presentation: 49 y.o. female who presented w/ spouse to [facility] ED. Admitted as Inpatient for evaluation and treatment of Pneumonia.      PMHx:  has a past medical history of Abnormal uterine bleeding (AUB) (7/7/2022), ABPA (allergic bronchopulmonary aspergillosis) ([facility]) (07/08/2012), Acute kidney injury ([facility]) (09/08/2017), Acute on chronic respiratory failure ([facility]) (03/04/2017), Anaphylaxis (06/06/2022), Anemia, Asthma, Chest pain (09/21/2021), Chronic pain, Chronic respiratory failure with hypoxia ([facility]) (09/07/2021), COVID-19 (06/03/2022), Cystic fibrosis ([facility]), Deep vein thrombosis ([facility]), Diabetes mellitus ([facility]), Fever (09/22/2024), GERD (gastroesophageal reflux disease), HBP (high blood pressure), HCAP (healthcare-associated pneumonia) (03/06/2020), History of transfusion, Influenza B (02/23/2020), Kidney stone, Moderate protein-calorie " malnutrition (HCC) (01/08/2022), Neutropenic fever  (HCC) (09/26/2020), Pneumonia due to Pseudomonas species (HCC) (10/28/2010), PONV (postoperative nausea and vomiting), Pseudomonas aeruginosa infection (09/20/2012), Renal calculus, Squamous cell skin cancer (01/12/2023), Transplant recipient, Ulcerative colitis (HCC), and Wellness examination (02/04/2013).      Presented w/ shortness of breath, persistent cough, sore throat started since Friday.  Reports the symptoms were progressively getting worse.  The cough was worsening to the point that patient started to have pain in her ribs not relieved by oxycodone. On exam, erythema of face present, normal BS- no wheezing, rales or rhonchi. Abnormal  Labs Bun/Creat 38/1.33, Lactic acid 2.7, H/H 8.4/30.5, D-dimer 1.12. CT PE study showing bud in tree appearance concerning for inflammatory/infectious. See below med list for meds given in ED.     Plan: IV Ceftriaxone and Doxycycline. F/U sputum culture, Continue Mucinex and Hycodan, Trend procal, RP 2 panel, Nebs PRN. ID consulted.    Anticipated Length of Stay/Certification Statement: Patient will be admitted on an inpatient basis with an anticipated length of stay of greater than 2 midnights secondary to pneumonia.     Date: 1/20/25   Day 2:   Patient reports continued cough, chest pain, shortness of breath. Pt saturated 86-88% RA last night, placed on 2 L NC @ 97%. Currently weaned off O2 @ 96% RA. Pt has facial swelling for which she takes Lasix as needed. On exam, Swelling (facial plethora) present. Rhonchi present. Tachypnea and assessory muscle usage present. Abnormal labs: Bun 28. H/H 7.8/29.2. Plan: Start Valtrex, Azithromycin PO, continue Ceftriaxone and Doxy IV. F/U sputum culture, DC IV fluids,  Continue Mucinex, Albuterol and Hycodan. Pain control for pleuritic CP.     Certification Statement: The patient will continue to require additional inpatient hospital stay due to respiratory issues, cough, chest  pain/pleuritic pain  Discharge Plan:  tbd - pending clinical improvement     ED Treatment-Medication Administration from 01/19/2025 1147 to 01/20/2025 1126         Date/Time Order Dose Route Action     01/19/2025 1305 HYDROmorphone HCl (DILAUDID) injection 0.2 mg 0.2 mg Intravenous Given     01/19/2025 1346 sodium chloride 0.9 % bolus 500 mL 500 mL Intravenous New Bag     01/19/2025 1352 HYDROmorphone (DILAUDID) injection 0.5 mg 0.5 mg Intravenous Given     01/19/2025 1435 iohexol (OMNIPAQUE) 350 MG/ML injection (MULTI-DOSE) 85 mL 85 mL Intravenous Given     01/19/2025 1541 HYDROmorphone (DILAUDID) injection 0.5 mg 0.5 mg Intravenous Given     01/19/2025 1553 ceftriaxone (ROCEPHIN) 1 g/50 mL in dextrose IVPB 1,000 mg Intravenous New Bag     01/19/2025 1845 lactated ringers bolus 500 mL 500 mL Intravenous New Bag     01/20/2025 0028 ALPRAZolam (XANAX) tablet 1 mg 1 mg Oral Given     01/19/2025 2006 apixaban (ELIQUIS) tablet 5 mg 5 mg Oral Given     01/20/2025 0911 apixaban (ELIQUIS) tablet 5 mg 5 mg Oral Given     01/20/2025 0947 atovaquone (MEPRON) oral suspension 1,500 mg 1,500 mg Oral Given     01/20/2025 0908 azithromycin (ZITHROMAX) tablet 250 mg 250 mg Oral Given     01/19/2025 2226 bisacodyl (DULCOLAX) EC tablet 5 mg 5 mg Oral Given     01/19/2025 2253 budesonide-formoterol (SYMBICORT) 160-4.5 mcg/act inhaler 2 puff 2 puff Inhalation Given     01/20/2025 1009 budesonide-formoterol (SYMBICORT) 160-4.5 mcg/act inhaler 2 puff 2 puff Inhalation Given     01/20/2025 0912 Cholecalciferol (VITAMIN D3) tablet 5,000 Units 5,000 Units Oral Given     01/19/2025 2226 famotidine (PEPCID) tablet 40 mg 40 mg Oral Given     01/20/2025 0837 insulin glargine (LANTUS) subcutaneous injection 12 Units 0.12 mL 12 Units Subcutaneous Given     01/20/2025 0946 magnesium gluconate (MAGONATE) tablet 500 mg 500 mg Oral Given     01/19/2025 2226 oxybutynin (DITROPAN-XL) 24 hr tablet 15 mg 15 mg Oral Given     01/19/2025 2006 pancrelipase  (Lip-Prot-Amyl) (CREON) delayed release capsule 48,000 Units 48,000 Units Oral Given     01/20/2025 0833 pancrelipase (Lip-Prot-Amyl) (CREON) delayed release capsule 48,000 Units 48,000 Units Oral Given     01/19/2025 2013 polyethylene glycol (MIRALAX) packet 17 g 17 g Oral Given     01/20/2025 0913 polyethylene glycol (MIRALAX) packet 17 g 17 g Oral Given     01/20/2025 0910 predniSONE tablet 20 mg 20 mg Oral Given     01/19/2025 2006 pregabalin (LYRICA) capsule 150 mg 150 mg Oral Given     01/20/2025 0911 pregabalin (LYRICA) capsule 150 mg 150 mg Oral Given     01/19/2025 2014 tacrolimus (PROGRAF) capsule 3 mg 3 mg Oral Given     01/20/2025 0945 tacrolimus (PROGRAF) capsule 3 mg 3 mg Oral Given     01/20/2025 0908 valACYclovir (VALTREX) tablet 500 mg 500 mg Oral Given     01/20/2025 0908 cyanocobalamin (VITAMIN B-12) tablet 1,000 mcg 1,000 mcg Oral Given     01/19/2025 2017 sodium chloride 0.9 % infusion 75 mL/hr Intravenous New Bag     01/19/2025 2005 oxyCODONE (ROXICODONE) IR tablet 5 mg -- Oral See Alternative     01/20/2025 0239 oxyCODONE (ROXICODONE) IR tablet 5 mg -- Oral See Alternative     01/20/2025 0732 oxyCODONE (ROXICODONE) IR tablet 5 mg -- Oral See Alternative     01/19/2025 2005 oxyCODONE (ROXICODONE) immediate release tablet 10 mg 10 mg Oral Given     01/20/2025 0239 oxyCODONE (ROXICODONE) immediate release tablet 10 mg 10 mg Oral Given     01/20/2025 0732 oxyCODONE (ROXICODONE) immediate release tablet 10 mg 10 mg Oral Given     01/19/2025 2112 HYDROmorphone (DILAUDID) injection 0.5 mg 0.5 mg Intravenous Given     01/19/2025 2333 HYDROmorphone (DILAUDID) injection 0.5 mg 0.5 mg Intravenous Given     01/20/2025 0333 HYDROmorphone (DILAUDID) injection 0.5 mg 0.5 mg Intravenous Given     01/20/2025 0613 HYDROmorphone (DILAUDID) injection 0.5 mg 0.5 mg Intravenous Given     01/20/2025 0829 HYDROmorphone (DILAUDID) injection 0.5 mg 0.5 mg Intravenous Given     01/20/2025 1043 HYDROmorphone (DILAUDID)  injection 0.5 mg 0.5 mg Intravenous Given     01/20/2025 0731 aluminum-magnesium hydroxide-simethicone (MAALOX) oral suspension 30 mL 30 mL Oral Given     01/19/2025 2118 insulin lispro (HumALOG/ADMELOG) 100 units/mL subcutaneous injection 1-5 Units 2 Units Subcutaneous Given     01/19/2025 2106 doxycycline (VIBRAMYCIN) 100 mg in sodium chloride 0.9 % 100 mL IVPB 100 mg Intravenous New Bag     01/20/2025 0807 doxycycline (VIBRAMYCIN) 100 mg in sodium chloride 0.9 % 100 mL IVPB 100 mg Intravenous New Bag     01/20/2025 0910 ascorbic acid (VITAMIN C) tablet 250 mg 250 mg Oral Given     01/19/2025 2006 guaiFENesin (MUCINEX) 12 hr tablet 1,200 mg 1,200 mg Oral Given     01/20/2025 0908 guaiFENesin (MUCINEX) 12 hr tablet 1,200 mg 1,200 mg Oral Given     01/19/2025 2226 pantoprazole (PROTONIX) EC tablet 40 mg 40 mg Oral Given     01/20/2025 0450 lidocaine (LIDODERM) 5 % patch 1 patch 1 patch Topical Medication Applied     01/20/2025 0510 methocarbamol (ROBAXIN) tablet 500 mg 500 mg Oral Given            Scheduled Medications:  apixaban, 5 mg, Oral, BID  vitamin C, 250 mg, Oral, Daily  atovaquone, 1,500 mg, Oral, Daily  azithromycin, 250 mg, Oral, Once per day on Monday Wednesday Friday  bisacodyl, 5 mg, Oral, HS  budesonide-formoterol, 2 puff, Inhalation, Q12H  cefTRIAXone, 1,000 mg, Intravenous, Q24H  Cholecalciferol, 5,000 Units, Oral, Daily  vitamin B-12, 1,000 mcg, Oral, Daily  doxycycline, 100 mg, Intravenous, Q12H  famotidine, 40 mg, Oral, HS  guaiFENesin, 1,200 mg, Oral, Q12H DOTTY  insulin glargine, 12 Units, Subcutaneous, QAM  insulin lispro, 1-5 Units, Subcutaneous, TID AC  magnesium gluconate, 500 mg, Oral, BID  oxybutynin, 15 mg, Oral, HS  pancrelipase (Lip-Prot-Amyl), 48,000 Units, Oral, TID With Meals  pantoprazole, 40 mg, Oral, HS  polyethylene glycol, 17 g, Oral, BID  predniSONE, 20 mg, Oral, Daily  pregabalin, 150 mg, Oral, BID  tacrolimus, 3 mg, Oral, BID  valACYclovir, 500 mg, Oral, Daily    methocarbamol  (ROBAXIN) tablet 500 mg  Dose: 500 mg  Freq: Once Route: PO  Start: 01/20/25 0500 End: 01/20/25 0510   valACYclovir (VALTREX) tablet 500 mg  Dose: 500 mg  Freq: Daily Route: PO  Start: 01/20/25 0900 End: 01/20/25 1406    Continuous IV Infusions:  sodium chloride 0.9 % infusion  Rate: 75 mL/hr Dose: 75 mL/hr  Freq: Continuous Route: IV  Indications of Use: IV Hydration  Last Dose: Stopped (01/20/25 0616)  Start: 01/19/25 1815 End: 01/20/25 0616       PRN Meds:  acetaminophen, 975 mg, Oral, Q6H PRN  ALPRAZolam, 1 mg, Oral, HS PRN - given x1 1/20  aluminum-magnesium hydroxide-simethicone, 30 mL, Oral, Q6H PRN- given x1 1/20  HYDROcodone Bit-Homatrop MBr, 5 mL, Oral, Q4H PRN  HYDROmorphone, 0.5 mg, Intravenous, Q2H PRN - given x2 1/19, x4 1/20  ipratropium-albuterol, 3 mL, Nebulization, Q6H PRN  lidocaine, 1 patch, Topical, Daily PRN - given x1 1/20  naloxone, 0.04 mg, Intravenous, Q1MIN PRN  ondansetron, 4 mg, Intravenous, Q6H PRN  oxyCODONE, 5 mg, Oral, Q4H PRN   Or  oxyCODONE, 10 mg, Oral, Q4H PRN - given x1 1/19, x2 1/20      ED Triage Vitals   Temperature Pulse Respirations Blood Pressure SpO2 Pain Score   01/19/25 1151 01/19/25 1151 01/19/25 1151 01/19/25 1151 01/19/25 1151 01/19/25 1305   97.8 °F (36.6 °C) 93 22 146/66 90 % 7     Weight (last 2 days)       None            Vital Signs (last 3 days)       Date/Time Temp Pulse Resp BP MAP (mmHg) SpO2 Calculated FIO2 (%) - Nasal Cannula Nasal Cannula O2 Flow Rate (L/min) O2 Device Patient Position - Orthostatic VS Pain    01/20/25 1043 -- -- -- -- -- -- -- -- -- -- 8 01/20/25 1030 -- 67 18 132/72 95 97 % 28 2 L/min Nasal cannula Lying --    01/20/25 1000 -- 62 16 131/78 100 98 % -- -- None (Room air) Lying --    01/20/25 0829 -- -- -- -- -- -- -- -- -- -- 8 01/20/25 0800 -- 64 16 116/71 90 98 % -- -- None (Room air) Lying --    01/20/25 0732 -- -- -- -- -- -- -- -- -- -- 8 01/20/25 0730 -- 71 18 145/90 112 96 % -- -- None (Room air) Sitting --    01/20/25  0700 -- 72 -- 146/65 94 98 % -- -- -- -- --    01/20/25 0630 -- 60 20 131/78 98 98 % -- -- None (Room air) Lying --    01/20/25 0615 -- 62 20 123/80 97 98 % -- -- -- -- --    01/20/25 0600 -- 62 20 123/80 -- 98 % -- -- -- -- --    01/20/25 0500 -- 65 20 119/76 93 98 % -- -- -- -- --    01/20/25 0430 -- 64 20 122/65 87 97 % -- -- -- -- --    01/20/25 0400 -- 64 20 128/74 97 97 % -- -- -- -- --    01/20/25 0333 -- -- -- -- -- -- -- -- -- -- 8    01/20/25 0330 -- 67 21 125/68 93 97 % -- -- -- -- --    01/20/25 0300 -- 68 20 129/67 92 98 % -- -- -- -- --    01/20/25 0239 -- -- -- -- -- -- -- -- -- -- 9    01/20/25 0230 -- 62 21 118/64 88 98 % -- -- -- -- --    01/20/25 0215 -- 64 -- -- -- 98 % -- -- -- -- --    01/20/25 0200 -- 64 20 115/71 89 98 % -- -- -- -- --    01/20/25 0100 -- 69 20 144/79 105 97 % -- -- -- -- --    01/20/25 0030 -- 78 20 138/85 106 97 % 28 2 L/min Nasal cannula -- --    01/20/25 0017 -- 83 20 -- -- 94 % 28 2 L/min Nasal cannula -- --    01/20/25 0015 -- 72 20 -- -- 86 % -- -- -- -- --    01/20/25 0000 -- 78 20 140/73 100 88 % -- -- -- -- --    01/19/25 2333 -- -- -- -- -- -- -- -- -- -- 8 01/19/25 2330 -- 80 20 147/101 118 94 % -- -- -- -- --    01/19/25 2300 -- 73 20 145/85 110 94 % -- -- -- -- --    01/19/25 2112 -- -- -- -- -- -- -- -- -- -- 8 01/19/25 2005 -- -- -- -- -- -- -- -- -- -- 8 01/19/25 1541 -- -- -- -- -- -- -- -- -- -- 8 01/19/25 1445 -- 76 20 135/92 104 94 % -- -- -- -- --    01/19/25 1400 -- 76 20 119/73 91 92 % -- -- -- -- --    01/19/25 1352 -- -- -- -- -- -- -- -- -- -- 8 01/19/25 1309 98.9 °F (37.2 °C) -- -- -- -- -- -- -- -- -- --    01/19/25 1305 -- -- -- -- -- -- -- -- -- -- 7    01/19/25 1300 -- 79 20 134/71 96 93 % -- -- -- -- --    01/19/25 1151 97.8 °F (36.6 °C) 93 22 146/66 95 90 % -- -- None (Room air) Sitting --              Pertinent Labs/Diagnostic Test Results:   Radiology:  CTA chest pe study   Final Interpretation by Jp May MD (01/19  1507)      No pulmonary embolism.      Left lung transplant demonstrating compensatory hyperinflation and diffuse tree-in-bud nodularity consistent with infectious/inflammatory etiology similar to prior study.      Status post right pneumonectomy with shifting of the mediastinum to the right and dilated esophagus likely related to resultant mass effect at the GE junction.            Workstation performed: [id number]         XR chest 1 view portable   Final Interpretation by Miguel Ángel Toth MD (01/19 1552)      No acute cardiopulmonary disease status post right pneumonectomy.            Workstation performed: [id number]           Cardiology:  ECG 12 lead   Final Result by Singh Romero MD (01/20 0837)   Normal sinus rhythm   Normal ECG   Confirmed by Singh Romero ([id number]) on 1/20/2025 8:37:12 AM        Results from last 7 days   Lab Units 01/19/25  1249   SARS-COV-2  Negative     Results from last 7 days   Lab Units 01/20/25  0442 01/19/25  1249   WBC Thousand/uL 6.47 7.53   HEMOGLOBIN g/dL 7.8* 8.4*   HEMATOCRIT % 29.2* 30.5*   PLATELETS Thousands/uL 145* 157   TOTAL NEUT ABS Thousands/µL  --  5.67         Results from last 7 days   Lab Units 01/20/25  0442 01/19/25  1249   SODIUM mmol/L 135 140   POTASSIUM mmol/L 4.8 4.4   CHLORIDE mmol/L 98 94*   CO2 mmol/L 32 39*   ANION GAP mmol/L 5 7   BUN mg/dL 28* 38*   CREATININE mg/dL 1.05 1.33*   EGFR ml/min/1.73sq m 63 47   CALCIUM mg/dL 8.5 8.2*     Results from last 7 days   Lab Units 01/19/25  1249   AST U/L 17   ALT U/L 23   ALK PHOS U/L 70   TOTAL PROTEIN g/dL 6.4   ALBUMIN g/dL 3.5   TOTAL BILIRUBIN mg/dL 0.40     Results from last 7 days   Lab Units 01/20/25  0730 01/19/25  2104   POC GLUCOSE mg/dl 122 236*     Results from last 7 days   Lab Units 01/20/25  0442 01/19/25  1249   GLUCOSE RANDOM mg/dL 122 181*             Beta- Hydroxybutyrate   Date Value Ref Range Status   10/21/2024 0.27 0.02 - 0.27 mmol/L Final   05/21/2024 0.06 0.02 - 0.27 mmol/L  Final      Results from last 7 days   Lab Units 01/19/25  1501 01/19/25  1249   HS TNI 0HR ng/L  --  11   HS TNI 2HR ng/L 10  --    HSTNI D2 ng/L -1  --      Results from last 7 days   Lab Units 01/19/25  1249   D-DIMER QUANTITATIVE ug/ml FEU 1.12*     Results from last 7 days   Lab Units 01/19/25  1249   PROTIME seconds 15.5*   INR  1.16   PTT seconds 46*         Results from last 7 days   Lab Units 01/20/25  0442 01/19/25  1249   PROCALCITONIN ng/ml <0.05 0.06     Results from last 7 days   Lab Units 01/19/25  1555 01/19/25  1249   LACTIC ACID mmol/L 2.5* 2.7*     Results from last 7 days   Lab Units 01/19/25  1249   INFLUENZA A PCR  Negative   INFLUENZA B PCR  Negative   RSV PCR  Negative         Results from last 7 days   Lab Units 01/19/25  1556 01/19/25  1254 01/19/25  1249   BLOOD CULTURE   --  Received in Microbiology Lab. Culture in Progress. Received in Microbiology Lab. Culture in Progress.   GRAM STAIN RESULT  2+ Epithelial cells per low power field*  2+ Polys*  2+ Gram positive cocci in pairs*  2+ Budding yeast*  --   --        Past Medical History:   Diagnosis Date    Abnormal uterine bleeding (AUB) 7/7/2022    ABPA (allergic bronchopulmonary aspergillosis) ([facility]) 07/08/2012    Acute kidney injury (HCC) 09/08/2017    Acute on chronic respiratory failure ([facility]) 03/04/2017    Last Assessment & Plan:  Formatting of this note might be different from the original. Recurrent with clinical worsening with enlarging PTX, s/p multiple chest tubes, but new fevers 6/19 and worsening leukocytosis indicating infection as cause of 6/19 decompensation with profound hypoxia in setting of shunting through right lung and continued pneumothorax s/p 2nd chest tube placement.  6/27: Right    Anaphylaxis 06/06/2022    Anemia     Asthma     Chest pain 09/21/2021    Chronic pain     Chronic respiratory failure with hypoxia (HCC) 09/07/2021    COVID-19 06/03/2022    Cystic fibrosis (HCC)     Deep vein thrombosis (HCC)      Diabetes mellitus (HCC)     Fever 09/22/2024    GERD (gastroesophageal reflux disease)     HBP (high blood pressure)     HCAP (healthcare-associated pneumonia) 03/06/2020    History of transfusion     Influenza B 02/23/2020    Kidney stone     Moderate protein-calorie malnutrition (HCC) 01/08/2022    Neutropenic fever  ([facility]) 09/26/2020    Pneumonia due to Pseudomonas species ([facility]) 10/28/2010    Last Assessment & Plan:  Formatting of this note might be different from the original. Polymicrobial pna w/ pseudomonas, M. Abscessus, Cadida glabrata/albicans    PONV (postoperative nausea and vomiting)     Pseudomonas aeruginosa infection 09/20/2012    Renal calculus     Squamous cell skin cancer 01/12/2023    SCCIS- Left upper thigh    Transplant recipient     Lung- 2013, 2017    Ulcerative colitis ([facility])     Wellness examination 02/04/2013    Formatting of this note might be different from the original. Discussed with patient and acknowledgement form signed on 2/4/2013 (ALYSHA)     Present on Admission:   GEOFF (acute kidney injury) ([facility])   Type 1 diabetes mellitus with diabetic chronic kidney disease, unspecified CKD stage ([facility])   Pancreatic insufficiency   GERD (gastroesophageal reflux disease)   Pleuritic chest pain      Admitting Diagnosis: Chest pain [R07.9]  Age/Sex: 49 y.o. female    Network Utilization Review Department  ATTENTION: Please call with any questions or concerns to [phone] and carefully listen to the prompts so that you are directed to the right person. All voicemails are confidential.   For Discharge needs, contact Care Management DC Support Team at [phone] opt. [phone]  Send all requests for admission clinical reviews, approved or denied determinations and any other requests to dedicated fax number below belonging to the campus where the patient is receiving treatment. List of dedicated fax numbers for the Facilities:  FACILITY NAME UR FAX NUMBER   ADMISSION DENIALS (Administrative/Medical Necessity)  [phone]   DISCHARGE SUPPORT TEAM (NETWORK) [phone]   PARENT CHILD HEALTH (Maternity/NICU/Pediatrics) [phone]   [facility] [phone]   [facility] [phone]   [facility] [phone]   [facility] [phone]   [facility] [phone]   [facility] [phone]   [facility] [phone]   [facility] [phone]   [facility] [phone]   [facility] [phone]   [facility] [phone]   [facility] [phone]

## 2025-01-20 NOTE — ASSESSMENT & PLAN NOTE
Mild tachycardia, tachypnea.  No fevers, leukocytosis.  Normal procalcitonin x 2.  Consider viral process based on patient's constellation of symptoms.  RP 2 is negative.  No new findings on chest CT, as below.  Seems to be clinically improving.  Admission blood cultures are still pending.   Antibiotic plan as below   Follow-up blood cultures   Follow temperatures and hemodynamics   Follow symptomatology   Recheck CBC in AM to assess potential infection

## 2025-01-20 NOTE — PLAN OF CARE
Problem: PAIN - ADULT  Goal: Verbalizes/displays adequate comfort level or baseline comfort level  Description: Interventions:  - Encourage patient to monitor pain and request assistance  - Assess pain using appropriate pain scale  - Administer analgesics based on type and severity of pain and evaluate response  - Implement non-pharmacological measures as appropriate and evaluate response  - Consider cultural and social influences on pain and pain management  - Notify physician/advanced practitioner if interventions unsuccessful or patient reports new pain  Outcome: Progressing     Problem: SAFETY ADULT  Goal: Patient will remain free of falls  Description: INTERVENTIONS:  - Educate patient/family on patient safety including physical limitations  - Instruct patient to call for assistance with activity   - Consult OT/PT to assist with strengthening/mobility   - Keep Call bell within reach  - Keep bed low and locked with side rails adjusted as appropriate  - Keep care items and personal belongings within reach  - Initiate and maintain comfort rounds  - Make Fall Risk Sign visible to staff  - Offer Toileting every 2 Hours, in advance of need  - Initiate/Maintain bed alarm  - Obtain necessary fall risk management equipment: call bell within reach   - Apply yellow socks and bracelet for high fall risk patients  - Consider moving patient to room near nurses station  Outcome: Progressing  Goal: Maintain or return to baseline ADL function  Description: INTERVENTIONS:  -  Assess patient's ability to carry out ADLs; assess patient's baseline for ADL function and identify physical deficits which impact ability to perform ADLs (bathing, care of mouth/teeth, toileting, grooming, dressing, etc.)  - Assess/evaluate cause of self-care deficits   - Assess range of motion  - Assess patient's mobility; develop plan if impaired  - Assess patient's need for assistive devices and provide as appropriate  - Encourage maximum independence  but intervene and supervise when necessary  - Involve family in performance of ADLs  - Assess for home care needs following discharge   - Consider OT consult to assist with ADL evaluation and planning for discharge  - Provide patient education as appropriate  Outcome: Progressing  Goal: Maintains/Returns to pre admission functional level  Description: INTERVENTIONS:  - Perform AM-PAC 6 Click Basic Mobility/ Daily Activity assessment daily.  - Set and communicate daily mobility goal to care team and patient/family/caregiver.   - Collaborate with rehabilitation services on mobility goals if consulted  - Perform Range of Motion 4 times a day.  - Reposition patient every 2 hours.  - Dangle patient 3 times a day  - Stand patient 3 times a day  - Ambulate patient 3 times a day  - Out of bed to chair 3 times a day   - Out of bed for meals 3 times a day  - Out of bed for toileting  - Record patient progress and toleration of activity level   Outcome: Progressing

## 2025-01-20 NOTE — ASSESSMENT & PLAN NOTE
Followed by YAYA Mcdonnell, secondary to cystic fibrosis   s/p bilat lung transplant 2013, subsequent rejection and severe bronchiolitis obliterans   s/p second left lung transplant 2017   s/p right pneumonectomy  Has had recurrent admissions for pneumonia and Pseudomonas/MSSA   Patient currently taking tacrolimus 3 mg twice a day and prednisone 20 mg daily  Patient takes for prophylaxis azithromycin 250 mg 3 times a week Monday Wednesday Friday, atovaquone 1500 milligrams daily for PJP prophylaxis and Valtrex 500 mg daily

## 2025-01-20 NOTE — ASSESSMENT & PLAN NOTE
Blood Sugar Average: Last 72 hrs:(P) 179  Continue Lantus 12 units in the morning   Continue humalog 10 units with meals  Sliding scale coverage   Hypoglycemia protocol  Diabetic diet

## 2025-01-20 NOTE — ASSESSMENT & PLAN NOTE
Cr 1.3 on admission, baseline Cr 0.8-1.1  Suspected prerenal due to possible acute viral illness?   Cr improved with IV fluids, will hold on further due to patient swelling/fluid retention  Restart lasix   BMP in am

## 2025-01-20 NOTE — ASSESSMENT & PLAN NOTE
Patient previously underwent double lung transplant in 2013. She required retransplant of the left lung in 2017. Course was then complicated by need for right-sided pneumonectomy in 2022 given chronic progressive infections. She remains on regimen with tacrolimus and prednisone. Prophylactic medications and dosing reviewed.    Continue atovaquone, Valtrex, azithromycin prophylaxis   Close follow-up with transplant center

## 2025-01-20 NOTE — RESPIRATORY THERAPY NOTE
01/20/25 1636   Respiratory Protocol   Protocol Initiated? Yes   Protocol Selection Respiratory   Language Barrier? No   Medical & Social History Reviewed? Yes   Diagnostic Studies Reviewed? Yes   Physical Assessment Performed? Yes   Respiratory Plan Home Bronchodilator Patient pathway   Respiratory Assessment   Resp Comments patient admitted for pneumonia, hx cystic fibrosis, takes albuterol prn and symbicort, will continue home regimen

## 2025-01-20 NOTE — ASSESSMENT & PLAN NOTE
Patient presentation due to sore throat, persistent cough, shortness of breath, wheezing, pleuritic chest pain starting since Friday.  Patient reports the symptoms are getting worse despite usual treatment with mucinex, nebulizers, OTC cough meds  CT chest appears similar to Nov '24 - no definitive infiltrate, but notes inflammatory tree-in-bud appearance  ID consulted for guidance due to transplant status and recurrence of symptoms  Procalcitonin normal x2 (was elevated Nov '24)  FLU/COVID/RSV/RP2 all negative   Continue respiratory protocol, supplemental O2 prn  Mucinex, albuterol, hycodan prn  For pleuritic chest pain oxycodone or dilaudid IV

## 2025-01-21 DIAGNOSIS — H10.9 BACTERIAL CONJUNCTIVITIS: ICD-10-CM

## 2025-01-21 PROBLEM — R05.9 COUGH: Status: ACTIVE | Noted: 2025-01-19

## 2025-01-21 LAB
GLUCOSE SERPL-MCNC: 132 MG/DL (ref 65–140)
GLUCOSE SERPL-MCNC: 159 MG/DL (ref 65–140)
GLUCOSE SERPL-MCNC: 294 MG/DL (ref 65–140)
GLUCOSE SERPL-MCNC: 322 MG/DL (ref 65–140)
GLUCOSE SERPL-MCNC: 344 MG/DL (ref 65–140)
GLUCOSE SERPL-MCNC: 460 MG/DL (ref 65–140)

## 2025-01-21 PROCEDURE — 82948 REAGENT STRIP/BLOOD GLUCOSE: CPT

## 2025-01-21 PROCEDURE — 94150 VITAL CAPACITY TEST: CPT

## 2025-01-21 PROCEDURE — 99232 SBSQ HOSP IP/OBS MODERATE 35: CPT | Performed by: PHYSICIAN ASSISTANT

## 2025-01-21 RX ORDER — HYDROMORPHONE HCL/PF 1 MG/ML
1 SYRINGE (ML) INJECTION EVERY 2 HOUR PRN
Status: DISCONTINUED | OUTPATIENT
Start: 2025-01-21 | End: 2025-01-22

## 2025-01-21 RX ORDER — AZELASTINE HYDROCHLORIDE 0.5 MG/ML
SOLUTION/ DROPS OPHTHALMIC
Qty: 18 ML | Refills: 1 | Status: SHIPPED | OUTPATIENT
Start: 2025-01-21

## 2025-01-21 RX ADMIN — PREGABALIN 150 MG: 75 CAPSULE ORAL at 09:12

## 2025-01-21 RX ADMIN — INSULIN GLARGINE 12 UNITS: 100 INJECTION, SOLUTION SUBCUTANEOUS at 09:13

## 2025-01-21 RX ADMIN — PANTOPRAZOLE SODIUM 40 MG: 40 TABLET, DELAYED RELEASE ORAL at 06:11

## 2025-01-21 RX ADMIN — APIXABAN 5 MG: 5 TABLET, FILM COATED ORAL at 09:13

## 2025-01-21 RX ADMIN — PREDNISONE 20 MG: 20 TABLET ORAL at 09:12

## 2025-01-21 RX ADMIN — POLYETHYLENE GLYCOL 3350 17 G: 17 POWDER, FOR SOLUTION ORAL at 09:10

## 2025-01-21 RX ADMIN — Medication 5000 UNITS: at 09:12

## 2025-01-21 RX ADMIN — INSULIN LISPRO 1 UNITS: 100 INJECTION, SOLUTION INTRAVENOUS; SUBCUTANEOUS at 11:31

## 2025-01-21 RX ADMIN — PANCRELIPASE 48000 UNITS: 120000; 24000; 76000 CAPSULE, DELAYED RELEASE PELLETS ORAL at 12:02

## 2025-01-21 RX ADMIN — OXYCODONE HYDROCHLORIDE 10 MG: 10 TABLET ORAL at 15:36

## 2025-01-21 RX ADMIN — INSULIN LISPRO 4 UNITS: 100 INJECTION, SOLUTION INTRAVENOUS; SUBCUTANEOUS at 17:25

## 2025-01-21 RX ADMIN — OXYCODONE HYDROCHLORIDE 10 MG: 10 TABLET ORAL at 19:37

## 2025-01-21 RX ADMIN — ALPRAZOLAM 1 MG: 0.5 TABLET ORAL at 22:36

## 2025-01-21 RX ADMIN — POLYETHYLENE GLYCOL 3350 17 G: 17 POWDER, FOR SOLUTION ORAL at 17:25

## 2025-01-21 RX ADMIN — OXYCODONE HYDROCHLORIDE 10 MG: 10 TABLET ORAL at 11:26

## 2025-01-21 RX ADMIN — ATOVAQUONE 1500 MG: 750 SUSPENSION ORAL at 09:21

## 2025-01-21 RX ADMIN — Medication 500 MG: at 17:24

## 2025-01-21 RX ADMIN — PANTOPRAZOLE SODIUM 40 MG: 40 TABLET, DELAYED RELEASE ORAL at 15:36

## 2025-01-21 RX ADMIN — GUAIFENESIN 1200 MG: 600 TABLET ORAL at 09:12

## 2025-01-21 RX ADMIN — HYDROCODONE BITARTRATE AND HOMATROPINE METHYLBROMIDE ORAL SOLUTION 5 ML: 5; 1.5 LIQUID ORAL at 22:45

## 2025-01-21 RX ADMIN — OXYCODONE HYDROCHLORIDE AND ACETAMINOPHEN 250 MG: 500 TABLET ORAL at 09:11

## 2025-01-21 RX ADMIN — PREGABALIN 150 MG: 75 CAPSULE ORAL at 17:24

## 2025-01-21 RX ADMIN — BUDESONIDE AND FORMOTEROL FUMARATE DIHYDRATE 2 PUFF: 160; 4.5 AEROSOL RESPIRATORY (INHALATION) at 11:28

## 2025-01-21 RX ADMIN — PANCRELIPASE 48000 UNITS: 120000; 24000; 76000 CAPSULE, DELAYED RELEASE PELLETS ORAL at 09:09

## 2025-01-21 RX ADMIN — FAMOTIDINE 40 MG: 20 TABLET, FILM COATED ORAL at 20:50

## 2025-01-21 RX ADMIN — SODIUM CHLORIDE 150 ML: 0.9 INJECTION, SOLUTION INTRAVENOUS at 01:54

## 2025-01-21 RX ADMIN — BISACODYL 5 MG: 5 TABLET, COATED ORAL at 20:50

## 2025-01-21 RX ADMIN — OXYBUTYNIN CHLORIDE 15 MG: 5 TABLET, EXTENDED RELEASE ORAL at 20:50

## 2025-01-21 RX ADMIN — TACROLIMUS 3 MG: 1 CAPSULE ORAL at 09:10

## 2025-01-21 RX ADMIN — HYDROMORPHONE HYDROCHLORIDE 1 MG: 1 INJECTION, SOLUTION INTRAMUSCULAR; INTRAVENOUS; SUBCUTANEOUS at 17:25

## 2025-01-21 RX ADMIN — GUAIFENESIN 1200 MG: 600 TABLET ORAL at 19:37

## 2025-01-21 RX ADMIN — APIXABAN 5 MG: 5 TABLET, FILM COATED ORAL at 17:24

## 2025-01-21 RX ADMIN — HYDROMORPHONE HYDROCHLORIDE 1 MG: 1 INJECTION, SOLUTION INTRAMUSCULAR; INTRAVENOUS; SUBCUTANEOUS at 13:59

## 2025-01-21 RX ADMIN — OXYCODONE HYDROCHLORIDE 10 MG: 10 TABLET ORAL at 01:51

## 2025-01-21 RX ADMIN — PANCRELIPASE 48000 UNITS: 120000; 24000; 76000 CAPSULE, DELAYED RELEASE PELLETS ORAL at 15:36

## 2025-01-21 RX ADMIN — TACROLIMUS 3 MG: 1 CAPSULE ORAL at 17:24

## 2025-01-21 RX ADMIN — CYANOCOBALAMIN TAB 500 MCG 1000 MCG: 500 TAB at 09:11

## 2025-01-21 RX ADMIN — INSULIN LISPRO 3 UNITS: 100 INJECTION, SOLUTION INTRAVENOUS; SUBCUTANEOUS at 22:36

## 2025-01-21 RX ADMIN — SODIUM CHLORIDE: 9 INJECTION, SOLUTION INTRAVENOUS at 01:52

## 2025-01-21 RX ADMIN — HYDROMORPHONE HYDROCHLORIDE 0.5 MG: 1 INJECTION, SOLUTION INTRAMUSCULAR; INTRAVENOUS; SUBCUTANEOUS at 09:13

## 2025-01-21 RX ADMIN — HYDROMORPHONE HYDROCHLORIDE 0.5 MG: 1 INJECTION, SOLUTION INTRAMUSCULAR; INTRAVENOUS; SUBCUTANEOUS at 04:57

## 2025-01-21 RX ADMIN — FUROSEMIDE 40 MG: 40 TABLET ORAL at 09:12

## 2025-01-21 RX ADMIN — CEFTRIAXONE SODIUM 1000 MG: 10 INJECTION, POWDER, FOR SOLUTION INTRAVENOUS at 19:37

## 2025-01-21 RX ADMIN — OXYCODONE HYDROCHLORIDE 10 MG: 10 TABLET ORAL at 07:09

## 2025-01-21 RX ADMIN — VALACYCLOVIR HYDROCHLORIDE 500 MG: 500 TABLET, FILM COATED ORAL at 09:13

## 2025-01-21 RX ADMIN — HYDROMORPHONE HYDROCHLORIDE 1 MG: 1 INJECTION, SOLUTION INTRAMUSCULAR; INTRAVENOUS; SUBCUTANEOUS at 20:45

## 2025-01-21 RX ADMIN — BUDESONIDE AND FORMOTEROL FUMARATE DIHYDRATE 2 PUFF: 160; 4.5 AEROSOL RESPIRATORY (INHALATION) at 20:49

## 2025-01-21 RX ADMIN — Medication 500 MG: at 09:12

## 2025-01-21 NOTE — ASSESSMENT & PLAN NOTE
Secondary to cystic fibrosis; Followed by YAYA Mcdonnell, s/p bilat lung transplant 2013, subsequent rejection and severe bronchiolitis obliterans   s/p second left lung transplant 2017   s/p right pneumonectomy  Has had recurrent admissions for pneumonia and Pseudomonas/MSSA   Patient currently taking tacrolimus 3 mg twice a day and prednisone 20 mg daily  Patient takes for prophylaxis azithromycin 250 mg 3 times a week Monday Wednesday Friday, atovaquone 1500 milligrams daily for PJP prophylaxis and Valtrex 500 mg daily

## 2025-01-21 NOTE — QUICK NOTE
Contacted by RN via secure chat, for capillary glucose 476. Sliding scale order placed for patient and insulin administered per RN.  Post administration of bedtime sliding scale insulin capillary glucose was 460. Regular insulin 5 units and normal saline bolus of 100 mL ordered for patient once.  Administered per nurse and current glucose 294. Will continue to monitor patient for hypo and hyperglycemia signs and symptoms.

## 2025-01-21 NOTE — ASSESSMENT & PLAN NOTE
Blood Sugar Average: Last 72 hrs:(P) 268.5370072707907101  Continue Lantus 12 units in the morning   Continue humalog 10 units with meals  Sliding scale coverage  Hyperglycemia due to extra dose steroids 1/20  Hypoglycemia protocol  Diabetic diet

## 2025-01-21 NOTE — ASSESSMENT & PLAN NOTE
Tachycardia, tachypnea. She remains afebrile and without leukocytosis or elevated procalcitonin. RP2 negative.  ID consulted, no clear new infectious etiology  Remains on ceftriaxone given transplant status, for now

## 2025-01-21 NOTE — UTILIZATION REVIEW
NOTIFICATION OF INPATIENT ADMISSION   AUTHORIZATION REQUEST   SERVICING FACILITY:   [place] [address]  Tax ID: [id number]  NPI: [id number] ATTENDING PROVIDER:  Attending Name and NPI#: Kody Esposito Md [[id number]]  Address: [address] [place] [address]  Phone: [phone]     ADMISSION INFORMATION:  Place of Service: Inpatient Acute [facility]  Place of Service Code: 21  Inpatient Admission Date/Time: 1/19/25  5:18 PM  Discharge Date/Time: No discharge date for patient encounter.  Admitting Diagnosis Code/Description:  Pleuritic chest pain [R07.81]  Chest pain [R07.9]  Dyspnea [R06.00]  Cystic fibrosis (HCC) [E84.9]  Productive cough [R05.8]  Hx of lung transplant (HCC) [Z94.2]     UTILIZATION REVIEW CONTACT:  Tata Moreno, Utilization   Network Utilization Review Department  Phone: [phone]  Fax [phone]  Email: [email or web address]  Contact for approvals/pending authorizations, clinical reviews, and discharge.     PHYSICIAN ADVISORY SERVICES:  Medical Necessity Denial & Mgil-ly-Cgsp Review  Phone: [phone]  Fax: [phone]  Email: [email or web address]     DISCHARGE SUPPORT TEAM:  For Patients Discharge Needs & Updates  Phone: [phone] opt. 2 Fax: [phone]  Email: [email or web address]

## 2025-01-21 NOTE — RESPIRATORY THERAPY NOTE
01/21/25 1648   Additional Assessments   $ Vital Capacity Mech/Peak Flow Yes   Vital Capacity 1.5 L   NIF -60 cm H2O

## 2025-01-21 NOTE — ASSESSMENT & PLAN NOTE
Cr 1.3 on admission, baseline Cr 0.8-1.1  Suspected prerenal due to possible acute viral illness?   Cr improved with IV fluids, will hold on further due to patient swelling/fluid retention  Restart lasix 1/20  BMP in am

## 2025-01-21 NOTE — PROGRESS NOTES
Progress Note - Hospitalist   Name: Maximilian Mann 49 y.o. female I MRN: [id number]  Unit/Bed#: -01 I Date of Admission: 1/19/2025   Date of Service: 1/21/2025 I Hospital Day: 2    Assessment & Plan  Hx of lung transplant (HCC)  Secondary to cystic fibrosis; Followed by YAYA Mcdonnell, s/p bilat lung transplant 2013, subsequent rejection and severe bronchiolitis obliterans   s/p second left lung transplant 2017   s/p right pneumonectomy  Has had recurrent admissions for pneumonia and Pseudomonas/MSSA   Patient currently taking tacrolimus 3 mg twice a day and prednisone 20 mg daily  Patient takes for prophylaxis azithromycin 250 mg 3 times a week Monday Wednesday Friday, atovaquone 1500 milligrams daily for PJP prophylaxis and Valtrex 500 mg daily  Cough  Patient presentation due to sore throat, persistent cough, shortness of breath, wheezing, pleuritic chest pain starting since Friday 1/17.  Patient reports the symptoms are getting worse despite usual treatment with mucinex, nebulizers, OTC cough meds  CT chest appears similar to Nov '24 - no definitive infiltrate, but notes inflammatory tree-in-bud appearance  ID consulted for guidance due to transplant status and recurrence of symptoms  Procalcitonin normal x2 (was elevated Nov '24)  FLU/COVID/RSV/RP2 all negative   Continue respiratory protocol, supplemental O2 prn  Mucinex, albuterol, hycodan prn  For pleuritic chest pain oxycodone or dilaudid IV  SIRS (systemic inflammatory response syndrome) ([facility])  Tachycardia, tachypnea. She remains afebrile and without leukocytosis or elevated procalcitonin. RP2 negative.  ID consulted, no clear new infectious etiology  Remains on ceftriaxone given transplant status, for now  Type 1 diabetes mellitus with diabetic chronic kidney disease, unspecified CKD stage ([facility])  Blood Sugar Average: Last 72 hrs:(P) [id number]  Continue Lantus 12 units in the morning   Continue humalog 10 units with meals  Sliding scale  coverage  Hyperglycemia due to extra dose steroids 1/20  Hypoglycemia protocol  Diabetic diet  Pancreatic insufficiency  Continue Creon  GEOFF (acute kidney injury) (HCC)  Cr 1.3 on admission, baseline Cr 0.8-1.1  Suspected prerenal due to possible acute viral illness?   Cr improved with IV fluids, will hold on further due to patient swelling/fluid retention  Restart lasix 1/20  BMP in am  GERD (gastroesophageal reflux disease)  History of severe GERD status post fundoplication in 2023   Patient currently taking dexlansoprazole 60 mg daily and famotidine 40 mg daily at bedtime  Maalox ordered as needed for acid reflux  Add additional pantoprazole 40 mg am  History of DVT (deep vein thrombosis)  Continue with Eliquis 5 mg twice a day.  Pleuritic chest pain  Mucinex, oxycodone, dilaudid, hycodan  Lactic acidosis  Elevated lactic acid 2.7 and improved with IV fluids to 2.5, suspect type B lactic acid elevation secondary to albuterol  No evidence of end-organ damage (barely meets GEOFF criteria, no definitive infection)  Acute respiratory insufficiency      VTE Pharmacologic Prophylaxis: VTE Score: 3 Moderate Risk (Score 3-4) - Pharmacological DVT Prophylaxis Ordered: apixaban (Eliquis).    Mobility:   Basic Mobility Inpatient Raw Score: 23  JH-HLM Goal: 7: Walk 25 feet or more  JH-HLM Achieved: 1: Laying in bed  JH-HLM Goal NOT achieved. Continue with multidisciplinary rounding and encourage appropriate mobility to improve upon JH-HLM goals.    Patient Centered Rounds: I performed bedside rounds with nursing staff today.   Discussions with Specialists or Other Care Team Provider: CM    Education and Discussions with Family / Patient: Patient declined call to .     Current Length of Stay: 2 day(s)  Current Patient Status: Inpatient   Certification Statement: The patient will continue to require additional inpatient hospital stay due to ongoing cough, pain management  Discharge Plan: Anticipate discharge in  24-48 hrs to home.    Code Status: Level 1 - Full Code    Subjective   Patient reports ongoing cough, pain uncontrolled. She can feel phlegm but can't get it out due to the pain associated with the cough. No fevers or chills. Has valid questions regarding the origin of this hospitalization - bacterial vs viral vs rejection. All questions answers to the best of my ability. Advised patient to give us the contact information for her transplant NP and I can update her on status.     Objective :  Temp:  [97.6 °F (36.4 °C)-99.2 °F (37.3 °C)] 99.2 °F (37.3 °C)  HR:  [64-77] 77  BP: (116-161)/(63-86) 133/72  Resp:  [13-19] 19  SpO2:  [93 %-98 %] 93 %  O2 Device: None (Room air)  Nasal Cannula O2 Flow Rate (L/min):  [2 L/min] 2 L/min    Body mass index is 24.31 kg/m².     Input and Output Summary (last 24 hours):     Intake/Output Summary (Last 24 hours) at 1/21/2025 1141  Last data filed at 1/21/2025 0153  Gross per 24 hour   Intake 900 ml   Output 0 ml   Net 900 ml       Physical Exam  Vitals and nursing note reviewed.   Constitutional:       General: She is not in acute distress.     Appearance: Normal appearance. She is not ill-appearing or toxic-appearing.   Cardiovascular:      Rate and Rhythm: Normal rate and regular rhythm.      Heart sounds: Normal heart sounds.   Pulmonary:      Effort: Pulmonary effort is normal. No respiratory distress.      Breath sounds: Examination of the left-upper field reveals rhonchi. Examination of the left-middle field reveals rhonchi. Examination of the left-lower field reveals rhonchi. Rhonchi (lower lobes > upper lobes) present. No wheezing.      Comments: Absent right breath sounds  Abdominal:      General: Bowel sounds are normal. There is no distension.      Palpations: Abdomen is soft.      Tenderness: There is no abdominal tenderness.   Musculoskeletal:         General: Swelling (improved facial plethora) present.   Skin:     General: Skin is warm and dry.      Coloration: Skin  is not pale.   Neurological:      Mental Status: She is alert and oriented to person, place, and time.   Psychiatric:         Mood and Affect: Mood normal.         Behavior: Behavior normal.            Lines/Drains:  Lines/Drains/Airways       Active Status       Name Placement date Placement time Site Days    Port A Cath 11/25/22 Right Chest 11/25/22  0953  Chest  788                    Central Line:  Goal for removal: Port accessed. Will de-access as appropriate.               Lab Results: I have reviewed the following results:   Results from last 7 days   Lab Units 01/20/25  0442 01/19/25  1249   WBC Thousand/uL 6.47 7.53   HEMOGLOBIN g/dL 7.8* 8.4*   HEMATOCRIT % 29.2* 30.5*   PLATELETS Thousands/uL 145* 157   SEGS PCT %  --  76*   LYMPHO PCT %  --  11*   MONO PCT %  --  11   EOS PCT %  --  1     Results from last 7 days   Lab Units 01/20/25  0442 01/19/25  1249   SODIUM mmol/L 135 140   POTASSIUM mmol/L 4.8 4.4   CHLORIDE mmol/L 98 94*   CO2 mmol/L 32 39*   BUN mg/dL 28* 38*   CREATININE mg/dL 1.05 1.33*   ANION GAP mmol/L 5 7   CALCIUM mg/dL 8.5 8.2*   ALBUMIN g/dL  --  3.5   TOTAL BILIRUBIN mg/dL  --  0.40   ALK PHOS U/L  --  70   ALT U/L  --  23   AST U/L  --  17   GLUCOSE RANDOM mg/dL 122 181*     Results from last 7 days   Lab Units 01/19/25  1249   INR  1.16     Results from last 7 days   Lab Units 01/21/25  1042 01/21/25  0622 01/21/25  0221 01/21/25  0059 01/20/25  2048 01/20/25  1622 01/20/25  1219 01/20/25  0730 01/19/25  2104   POC GLUCOSE mg/dl 159* 132 294* 460* 476* 328* 206* 122 236*         Results from last 7 days   Lab Units 01/20/25  0442 01/19/25  1555 01/19/25  1249   LACTIC ACID mmol/L  --  2.5* 2.7*   PROCALCITONIN ng/ml <0.05  --  0.06       Recent Cultures (last 7 days):   Results from last 7 days   Lab Units 01/19/25  1556 01/19/25  1254 01/19/25  1249   BLOOD CULTURE   --  No Growth at 24 hrs. No Growth at 24 hrs.   SPUTUM CULTURE  3+ Growth of Beta Hemolytic Streptococcus Group B*   --   --    GRAM STAIN RESULT  2+ Epithelial cells per low power field*  2+ Polys*  2+ Gram positive cocci in pairs*  2+ Budding yeast*  --   --        Imaging Results Review: No pertinent imaging studies reviewed.  Other Study Results Review: No additional pertinent studies reviewed.    Last 24 Hours Medication List:     Current Facility-Administered Medications:     acetaminophen (TYLENOL) tablet 975 mg, Q6H PRN    ALPRAZolam (XANAX) tablet 1 mg, HS PRN    aluminum-magnesium hydroxide-simethicone (MAALOX) oral suspension 30 mL, Q6H PRN    apixaban (ELIQUIS) tablet 5 mg, BID    ascorbic acid (VITAMIN C) tablet 250 mg, Daily    atovaquone (MEPRON) oral suspension 1,500 mg, Daily    azithromycin (ZITHROMAX) tablet 250 mg, Once per day on Monday Wednesday Friday    bisacodyl (DULCOLAX) EC tablet 5 mg, HS    budesonide-formoterol (SYMBICORT) 160-4.5 mcg/act inhaler 2 puff, Q12H    ceftriaxone (ROCEPHIN) 1 g/50 mL in dextrose IVPB, Q24H, Last Rate: 1,000 mg (01/20/25 1827)    Cholecalciferol (VITAMIN D3) tablet 5,000 Units, Daily    cyanocobalamin (VITAMIN B-12) tablet 1,000 mcg, Daily    famotidine (PEPCID) tablet 40 mg, HS    furosemide (LASIX) tablet 40 mg, Daily    guaiFENesin (MUCINEX) 12 hr tablet 1,200 mg, Q12H DOTTY    HYDROcodone Bit-Homatrop MBr (HYCODAN) oral syrup 5 mL, Q4H PRN    HYDROmorphone (DILAUDID) injection 0.5 mg, Q2H PRN    insulin glargine (LANTUS) subcutaneous injection 12 Units 0.12 mL, QAM    insulin lispro (HumALOG/ADMELOG) 100 units/mL subcutaneous injection 1-5 Units, TID AC **AND** Fingerstick Glucose (POCT), 4x Daily AC and at bedtime    insulin lispro (HumALOG/ADMELOG) 100 units/mL subcutaneous injection 1-5 Units, HS    ipratropium-albuterol (DUO-NEB) 0.5-2.5 mg/3 mL inhalation solution 3 mL, Q6H PRN    lidocaine (LIDODERM) 5 % patch 1 patch, Daily PRN    magnesium gluconate (MAGONATE) tablet 500 mg, BID    naloxone (NARCAN) 0.04 mg/mL syringe 0.04 mg, Q1MIN PRN    ondansetron (ZOFRAN)  injection 4 mg, Q6H PRN    oxybutynin (DITROPAN-XL) 24 hr tablet 15 mg, HS    oxyCODONE (ROXICODONE) IR tablet 5 mg, Q4H PRN **OR** oxyCODONE (ROXICODONE) immediate release tablet 10 mg, Q4H PRN    pancrelipase (Lip-Prot-Amyl) (CREON) delayed release capsule 48,000 Units, TID With Meals    pantoprazole (PROTONIX) EC tablet 40 mg, BID AC    polyethylene glycol (MIRALAX) packet 17 g, BID    predniSONE tablet 20 mg, Daily    pregabalin (LYRICA) capsule 150 mg, BID    tacrolimus (PROGRAF) capsule 3 mg, BID    valACYclovir (VALTREX) tablet 500 mg, Q24H    Administrative Statements   Today, Patient Was Seen By: Yolanda Ferrara PA-C  I have spent a total time of 45 minutes in caring for this patient on the day of the visit/encounter including Instructions for management, Patient and family education, Counseling / Coordination of care, Documenting in the medical record, Reviewing / ordering tests, medicine, procedures  , and Communicating with other healthcare professionals .    **Please Note: This note may have been constructed using a voice recognition system.**

## 2025-01-21 NOTE — ASSESSMENT & PLAN NOTE
Patient presentation due to sore throat, persistent cough, shortness of breath, wheezing, pleuritic chest pain starting since Friday 1/17.  Patient reports the symptoms are getting worse despite usual treatment with mucinex, nebulizers, OTC cough meds  CT chest appears similar to Nov '24 - no definitive infiltrate, but notes inflammatory tree-in-bud appearance  ID consulted for guidance due to transplant status and recurrence of symptoms  Procalcitonin normal x2 (was elevated Nov '24)  FLU/COVID/RSV/RP2 all negative   Continue respiratory protocol, supplemental O2 prn  Mucinex, albuterol, hycodan prn  For pleuritic chest pain oxycodone or dilaudid IV

## 2025-01-22 PROBLEM — R05.9 COUGH: Chronic | Status: ACTIVE | Noted: 2025-01-19

## 2025-01-22 LAB
BACTERIA SPT RESP CULT: ABNORMAL
BACTERIA SPT RESP CULT: ABNORMAL
CMV DNA SERPL NAA+PROBE-ACNC: ABNORMAL [IU]/ML
GLUCOSE SERPL-MCNC: 150 MG/DL (ref 65–140)
GLUCOSE SERPL-MCNC: 217 MG/DL (ref 65–140)
GLUCOSE SERPL-MCNC: 327 MG/DL (ref 65–140)
GLUCOSE SERPL-MCNC: 390 MG/DL (ref 65–140)
GRAM STN SPEC: ABNORMAL

## 2025-01-22 PROCEDURE — 99232 SBSQ HOSP IP/OBS MODERATE 35: CPT | Performed by: PHYSICIAN ASSISTANT

## 2025-01-22 PROCEDURE — 82948 REAGENT STRIP/BLOOD GLUCOSE: CPT

## 2025-01-22 PROCEDURE — G0545 PR INHERENT VISIT TO INPT: HCPCS | Performed by: INTERNAL MEDICINE

## 2025-01-22 PROCEDURE — 99232 SBSQ HOSP IP/OBS MODERATE 35: CPT | Performed by: INTERNAL MEDICINE

## 2025-01-22 RX ORDER — HYDROMORPHONE HCL/PF 1 MG/ML
1 SYRINGE (ML) INJECTION EVERY 4 HOURS PRN
Status: DISCONTINUED | OUTPATIENT
Start: 2025-01-22 | End: 2025-01-25

## 2025-01-22 RX ORDER — MORPHINE SULFATE 15 MG/1
15 TABLET, FILM COATED, EXTENDED RELEASE ORAL EVERY 8 HOURS SCHEDULED
Refills: 0 | Status: DISCONTINUED | OUTPATIENT
Start: 2025-01-22 | End: 2025-01-28 | Stop reason: HOSPADM

## 2025-01-22 RX ORDER — NYSTATIN 100000 [USP'U]/ML
500000 SUSPENSION ORAL 4 TIMES DAILY
Status: DISCONTINUED | OUTPATIENT
Start: 2025-01-22 | End: 2025-01-28 | Stop reason: HOSPADM

## 2025-01-22 RX ORDER — HYDROMORPHONE HCL/PF 1 MG/ML
0.5 SYRINGE (ML) INJECTION ONCE
Status: COMPLETED | OUTPATIENT
Start: 2025-01-22 | End: 2025-01-22

## 2025-01-22 RX ORDER — AMOXICILLIN 250 MG/1
500 CAPSULE ORAL EVERY 8 HOURS SCHEDULED
Status: DISCONTINUED | OUTPATIENT
Start: 2025-01-22 | End: 2025-01-27

## 2025-01-22 RX ADMIN — INSULIN LISPRO 5 UNITS: 100 INJECTION, SOLUTION INTRAVENOUS; SUBCUTANEOUS at 17:46

## 2025-01-22 RX ADMIN — PREGABALIN 150 MG: 75 CAPSULE ORAL at 08:24

## 2025-01-22 RX ADMIN — CYANOCOBALAMIN TAB 500 MCG 1000 MCG: 500 TAB at 08:25

## 2025-01-22 RX ADMIN — APIXABAN 5 MG: 5 TABLET, FILM COATED ORAL at 17:42

## 2025-01-22 RX ADMIN — FAMOTIDINE 40 MG: 20 TABLET, FILM COATED ORAL at 21:35

## 2025-01-22 RX ADMIN — INSULIN LISPRO 1 UNITS: 100 INJECTION, SOLUTION INTRAVENOUS; SUBCUTANEOUS at 08:32

## 2025-01-22 RX ADMIN — OXYBUTYNIN CHLORIDE 15 MG: 5 TABLET, EXTENDED RELEASE ORAL at 21:35

## 2025-01-22 RX ADMIN — HYDROMORPHONE HYDROCHLORIDE 0.5 MG: 1 INJECTION, SOLUTION INTRAMUSCULAR; INTRAVENOUS; SUBCUTANEOUS at 12:22

## 2025-01-22 RX ADMIN — POLYETHYLENE GLYCOL 3350 17 G: 17 POWDER, FOR SOLUTION ORAL at 17:45

## 2025-01-22 RX ADMIN — BUDESONIDE AND FORMOTEROL FUMARATE DIHYDRATE 2 PUFF: 160; 4.5 AEROSOL RESPIRATORY (INHALATION) at 21:35

## 2025-01-22 RX ADMIN — AMOXICILLIN 500 MG: 250 CAPSULE ORAL at 18:01

## 2025-01-22 RX ADMIN — PREDNISONE 20 MG: 20 TABLET ORAL at 08:24

## 2025-01-22 RX ADMIN — FUROSEMIDE 40 MG: 40 TABLET ORAL at 08:25

## 2025-01-22 RX ADMIN — OXYCODONE HYDROCHLORIDE AND ACETAMINOPHEN 250 MG: 500 TABLET ORAL at 08:24

## 2025-01-22 RX ADMIN — PANTOPRAZOLE SODIUM 40 MG: 40 TABLET, DELAYED RELEASE ORAL at 17:43

## 2025-01-22 RX ADMIN — AZITHROMYCIN 250 MG: 250 TABLET, FILM COATED ORAL at 08:24

## 2025-01-22 RX ADMIN — PANCRELIPASE 48000 UNITS: 120000; 24000; 76000 CAPSULE, DELAYED RELEASE PELLETS ORAL at 08:30

## 2025-01-22 RX ADMIN — POLYETHYLENE GLYCOL 3350 17 G: 17 POWDER, FOR SOLUTION ORAL at 08:32

## 2025-01-22 RX ADMIN — PREGABALIN 150 MG: 75 CAPSULE ORAL at 17:43

## 2025-01-22 RX ADMIN — OXYCODONE HYDROCHLORIDE 10 MG: 10 TABLET ORAL at 20:07

## 2025-01-22 RX ADMIN — APIXABAN 5 MG: 5 TABLET, FILM COATED ORAL at 08:26

## 2025-01-22 RX ADMIN — MORPHINE SULFATE 15 MG: 15 TABLET, EXTENDED RELEASE ORAL at 21:35

## 2025-01-22 RX ADMIN — HYDROMORPHONE HYDROCHLORIDE 1 MG: 1 INJECTION, SOLUTION INTRAMUSCULAR; INTRAVENOUS; SUBCUTANEOUS at 03:28

## 2025-01-22 RX ADMIN — TACROLIMUS 3 MG: 1 CAPSULE ORAL at 08:24

## 2025-01-22 RX ADMIN — PANTOPRAZOLE SODIUM 40 MG: 40 TABLET, DELAYED RELEASE ORAL at 06:02

## 2025-01-22 RX ADMIN — Medication 500 MG: at 08:26

## 2025-01-22 RX ADMIN — Medication 500 MG: at 17:42

## 2025-01-22 RX ADMIN — HYDROMORPHONE HYDROCHLORIDE 1 MG: 1 INJECTION, SOLUTION INTRAMUSCULAR; INTRAVENOUS; SUBCUTANEOUS at 22:53

## 2025-01-22 RX ADMIN — OXYCODONE HYDROCHLORIDE 10 MG: 10 TABLET ORAL at 02:03

## 2025-01-22 RX ADMIN — VALACYCLOVIR HYDROCHLORIDE 500 MG: 500 TABLET, FILM COATED ORAL at 08:25

## 2025-01-22 RX ADMIN — INSULIN LISPRO 2 UNITS: 100 INJECTION, SOLUTION INTRAVENOUS; SUBCUTANEOUS at 12:27

## 2025-01-22 RX ADMIN — INSULIN LISPRO 3 UNITS: 100 INJECTION, SOLUTION INTRAVENOUS; SUBCUTANEOUS at 21:36

## 2025-01-22 RX ADMIN — GUAIFENESIN 1200 MG: 600 TABLET ORAL at 20:07

## 2025-01-22 RX ADMIN — OXYCODONE HYDROCHLORIDE 10 MG: 10 TABLET ORAL at 06:03

## 2025-01-22 RX ADMIN — BUDESONIDE AND FORMOTEROL FUMARATE DIHYDRATE 2 PUFF: 160; 4.5 AEROSOL RESPIRATORY (INHALATION) at 13:27

## 2025-01-22 RX ADMIN — INSULIN GLARGINE 12 UNITS: 100 INJECTION, SOLUTION SUBCUTANEOUS at 08:25

## 2025-01-22 RX ADMIN — ATOVAQUONE 1500 MG: 750 SUSPENSION ORAL at 08:31

## 2025-01-22 RX ADMIN — GUAIFENESIN 1200 MG: 600 TABLET ORAL at 08:25

## 2025-01-22 RX ADMIN — HYDROMORPHONE HYDROCHLORIDE 1 MG: 1 INJECTION, SOLUTION INTRAMUSCULAR; INTRAVENOUS; SUBCUTANEOUS at 08:20

## 2025-01-22 RX ADMIN — PANCRELIPASE 48000 UNITS: 120000; 24000; 76000 CAPSULE, DELAYED RELEASE PELLETS ORAL at 12:30

## 2025-01-22 RX ADMIN — NYSTATIN 500000 UNITS: 100000 SUSPENSION ORAL at 17:46

## 2025-01-22 RX ADMIN — PANCRELIPASE 48000 UNITS: 120000; 24000; 76000 CAPSULE, DELAYED RELEASE PELLETS ORAL at 17:45

## 2025-01-22 RX ADMIN — NYSTATIN 500000 UNITS: 100000 SUSPENSION ORAL at 21:38

## 2025-01-22 RX ADMIN — MORPHINE SULFATE 15 MG: 15 TABLET, EXTENDED RELEASE ORAL at 14:25

## 2025-01-22 RX ADMIN — Medication 5000 UNITS: at 08:24

## 2025-01-22 RX ADMIN — TACROLIMUS 3 MG: 1 CAPSULE ORAL at 17:43

## 2025-01-22 RX ADMIN — BISACODYL 5 MG: 5 TABLET, COATED ORAL at 21:35

## 2025-01-22 NOTE — ASSESSMENT & PLAN NOTE
Blood Sugar Average: Last 72 hrs:(P) [id number]  Continue Lantus 12 units in the morning   Continue humalog 10 units with meals  Sliding scale coverage  Hyperglycemia due to extra dose steroids 1/20  Hypoglycemia protocol  Diabetic diet

## 2025-01-22 NOTE — PLAN OF CARE
Problem: PAIN - ADULT  Goal: Verbalizes/displays adequate comfort level or baseline comfort level  Description: Interventions:  - Encourage patient to monitor pain and request assistance  - Assess pain using appropriate pain scale  - Administer analgesics based on type and severity of pain and evaluate response  - Implement non-pharmacological measures as appropriate and evaluate response  - Consider cultural and social influences on pain and pain management  - Notify physician/advanced practitioner if interventions unsuccessful or patient reports new pain  Outcome: Progressing     Problem: INFECTION - ADULT  Goal: Absence or prevention of progression during hospitalization  Description: INTERVENTIONS:  - Assess and monitor for signs and symptoms of infection  - Monitor lab/diagnostic results  - Monitor all insertion sites, i.e. indwelling lines, tubes, and drains  - Monitor endotracheal if appropriate and nasal secretions for changes in amount and color  - [place] appropriate cooling/warming therapies per order  - Administer medications as ordered  - Instruct and encourage patient and family to use good hand hygiene technique  - Identify and instruct in appropriate isolation precautions for identified infection/condition  Outcome: Progressing  Goal: Absence of fever/infection during neutropenic period  Description: INTERVENTIONS:  - Monitor WBC    Outcome: Progressing     Problem: SAFETY ADULT  Goal: Patient will remain free of falls  Description: INTERVENTIONS:  - Educate patient/family on patient safety including physical limitations  - Instruct patient to call for assistance with activity   - Consult OT/PT to assist with strengthening/mobility   - Keep Call bell within reach  - Keep bed low and locked with side rails adjusted as appropriate  - Keep care items and personal belongings within reach  - Initiate and maintain comfort rounds  - Make Fall Risk Sign visible to staff  - Offer Toileting every  Hours,  in advance of need  - Initiate/Maintain alarm  - Obtain necessary fall risk management equipment:   - Apply yellow socks and bracelet for high fall risk patients  - Consider moving patient to room near nurses station  Outcome: Progressing  Goal: Maintain or return to baseline ADL function  Description: INTERVENTIONS:  -  Assess patient's ability to carry out ADLs; assess patient's baseline for ADL function and identify physical deficits which impact ability to perform ADLs (bathing, care of mouth/teeth, toileting, grooming, dressing, etc.)  - Assess/evaluate cause of self-care deficits   - Assess range of motion  - Assess patient's mobility; develop plan if impaired  - Assess patient's need for assistive devices and provide as appropriate  - Encourage maximum independence but intervene and supervise when necessary  - Involve family in performance of ADLs  - Assess for home care needs following discharge   - Consider OT consult to assist with ADL evaluation and planning for discharge  - Provide patient education as appropriate  Outcome: Progressing  Goal: Maintains/Returns to pre admission functional level  Description: INTERVENTIONS:  - Perform AM-PAC 6 Click Basic Mobility/ Daily Activity assessment daily.  - Set and communicate daily mobility goal to care team and patient/family/caregiver.   - Collaborate with rehabilitation services on mobility goals if consulted  - Perform Range of Motion  times a day.  - Reposition patient every  hours.  - Dangle patient  times a day  - Stand patient  times a day  - Ambulate patient  times a day  - Out of bed to chair times a day   - Out of bed for meals  times a day  - Out of bed for toileting  - Record patient progress and toleration of activity level   Outcome: Progressing     Problem: DISCHARGE PLANNING  Goal: Discharge to home or other facility with appropriate resources  Description: INTERVENTIONS:  - Identify barriers to discharge w/patient and caregiver  - Arrange for  needed discharge resources and transportation as appropriate  - Identify discharge learning needs (meds, wound care, etc.)  - Arrange for interpretive services to assist at discharge as needed  - Refer to Case Management Department for coordinating discharge planning if the patient needs post-hospital services based on physician/advanced practitioner order or complex needs related to functional status, cognitive ability, or social support system  Outcome: Progressing     Problem: Knowledge Deficit  Goal: Patient/family/caregiver demonstrates understanding of disease process, treatment plan, medications, and discharge instructions  Description: Complete learning assessment and assess knowledge base.  Interventions:  - Provide teaching at level of understanding  - Provide teaching via preferred learning methods  Outcome: Progressing

## 2025-01-22 NOTE — ASSESSMENT & PLAN NOTE
Patient presentation due to sore throat, persistent cough, shortness of breath, wheezing, pleuritic chest pain starting since Friday 1/17.  Patient reports the symptoms are getting worse despite usual treatment with mucinex, nebulizers, OTC cough meds  CT chest appears similar to Nov '24 - no definitive infiltrate, but notes inflammatory tree-in-bud appearance  ID consulted for guidance due to transplant status and recurrence of symptoms  Procalcitonin normal x2 (was elevated Nov '24)  FLU/COVID/RSV/RP2 all negative   Continue respiratory protocol, supplemental O2 prn  Mucinex, albuterol, hycodan prn  Patient with reported uncontrolled pain, will start morphine ER and cut back dilaudid IV

## 2025-01-22 NOTE — ASSESSMENT & PLAN NOTE
Secondary to cystic fibrosis; Followed by [facility], s/p bilat lung transplant 2013, subsequent rejection and severe bronchiolitis obliterans   s/p second left lung transplant 2017   s/p right pneumonectomy  Has had recurrent admissions for pneumonia and Pseudomonas/MSSA   Patient currently taking tacrolimus 3 mg twice a day and prednisone 20 mg daily  Patient takes for prophylaxis azithromycin 250 mg 3 times a week MWF, atovaquone 1500 milligrams daily for PJP prophylaxis and Valtrex 500 mg daily

## 2025-01-22 NOTE — TREATMENT PLAN
Spoke to Maggie BLACK with transplant team to discuss current medical status and plan. Expressed my concern for continued pain control when discharge ready and follow up outpatient as well. Agree with Maggie's recommendation for palliative care consultation with their pulmonary palliative specialist. Will continue to discuss with [facility] as hospitalization progresses.

## 2025-01-22 NOTE — TRANSPORTATION MEDICAL NECESSITY
"Section I - General Information    Name of Patient: Maximilian Mann                 : 1975    Medicare #: [id number]  Transport Date: 25 (PCS is valid for round trips on this date and for all repetitive trips in the 60-day range as noted below.)  Origin: [facility] 4TH FLOOR MED SURG UNIT                                                         Destination: [facility]  Is the pt's stay covered under Medicare Part A (PPS/DRG)   []     Closest appropriate facility? If no, why is transport to more distant facility required? Yes  If hospice pt, is this transport related to pt's terminal illness? No       Section II - Medical Necessity Questionnaire  Ambulance transportation is medically necessary only if other means of transport are contraindicated or would be potentially harmful to the patient. To meet this requirement, the patient must either be \"bed confined\" or suffer from a condition such that transport by means other than ambulance is contraindicated by the patient's condition. The following questions must be answered by the medical professional signing below for this form to be valid:    1)  Describe the MEDICAL CONDITION (physical and/or mental) of this patient AT THE TIME OF AMBULANCE TRANSPORT that requires the patient to be transported in an ambulance and why transport by other means is contraindicated by the patient's condition:Secondary to cystic fibrosis; Followed by [facility], s/p bilat lung transplant , subsequent rejection and severe bronchiolitis obliterans   s/p second left lung transplant    s/p right pneumonectomy  Has had recurrent admissions for pneumonia and Pseudomonas/MSSA   Patient currently taking tacrolimus 3 mg twice a day and prednisone 20 mg daily  Patient takes for prophylaxis azithromycin 250 mg 3 times a week , atovaquone 1500 milligrams daily for PJP prophylaxis and Valtrex 500 mg daily    2) Is the patient \"bed " "confined\" as defined below?     No  To be \"be confined\" the patient must satisfy all three of the following conditions: (1) unable to get up from bed without Assistance; AND (2) unable to ambulate; AND (3) unable to sit in a chair or wheelchair.    3) Can this patient safely be transported by car or wheelchair van (i.e., seated during transport without a medical attendant or monitoring)?   No    4) In addition to completing questions 1-3 above, please check any of the following conditions that apply*:   *Note: supporting documentation for any boxes checked must be maintained in the patient's medical records.  If hosp-hosp transfer, describe services needed at 2nd facility not available at 1st facility?         Section III - Signature of Physician or Healthcare Professional  I certify that the above information is true and correct based on my evaluation of this patient, and represent that the patient requires transport by ambulance and that other forms of transport are contraindicated. I understand that this information will be used by the Centers for Medicare and Medicaid Services (CMS) to support the determination of medical necessity for ambulance services, and I represent that I have personal knowledge of the patient's condition at time of transport.    []  If this box is checked, I also certify that the patient is physically or mentally incapable of signing the ambulance service's claim and that the institution with which I am affiliated has furnished care, services, or assistance to the patient.    My signature below is made on behalf of the patient pursuant to 42 CFR §424.36(b)(4). In accordance with 42 CFR §424.37, the specific reason(s) that the patient is physically or mentally incapable of signing the claim form is as follows:       Signature of Physician* or Healthcare Professional___Cara Amaral _____________________________________________  Signature Date 1/23/2025 (For scheduled repetitive " transports, this form is not valid for transports performed more than 60 days after this date)    Printed Name & Credentials of Physician or Healthcare Professional (MD, DO, RN, etc.)________________________________  *Form must be signed by patient's attending physician for scheduled, repetitive transports. For non-repetitive, unscheduled ambulance transports, if unable to obtain the signature of the attending physician, any of the following may sign (choose appropriate option below)  [] Physician Assistant []  Clinical Nurse Specialist []  Registered Nurse  []  Nurse Practitioner   X Discharge Planner

## 2025-01-22 NOTE — ASSESSMENT & PLAN NOTE
Lab Results   Component Value Date    HGBA1C 7.8 (H) 12/16/2024       Recent Labs     01/21/25  1651 01/21/25  2210 01/22/25  0632 01/22/25  1132   POCGLU 344* 322* 150* 217*     With recent hemoglobin A1c of 7.8.  Potential risk factor for infection.  Blood sugar control per primary service.

## 2025-01-22 NOTE — ASSESSMENT & PLAN NOTE
Cr 1.3 on admission, baseline Cr 0.8-1.1  Suspected prerenal due to possible acute viral illness?   Cr improved with IV fluids, will hold on further due to patient swelling/fluid retention  Restart lasix 1/20  BMP not collected

## 2025-01-22 NOTE — PLAN OF CARE
Problem: PAIN - ADULT  Goal: Verbalizes/displays adequate comfort level or baseline comfort level  Description: Interventions:  - Encourage patient to monitor pain and request assistance  - Assess pain using appropriate pain scale  - Administer analgesics based on type and severity of pain and evaluate response  - Implement non-pharmacological measures as appropriate and evaluate response  - Consider cultural and social influences on pain and pain management  - Notify physician/advanced practitioner if interventions unsuccessful or patient reports new pain  Outcome: Progressing     Problem: INFECTION - ADULT  Goal: Absence or prevention of progression during hospitalization  Description: INTERVENTIONS:  - Assess and monitor for signs and symptoms of infection  - Monitor lab/diagnostic results  - Monitor all insertion sites, i.e. indwelling lines, tubes, and drains  - Monitor endotracheal if appropriate and nasal secretions for changes in amount and color  - [place] appropriate cooling/warming therapies per order  - Administer medications as ordered  - Instruct and encourage patient and family to use good hand hygiene technique  - Identify and instruct in appropriate isolation precautions for identified infection/condition  Outcome: Progressing  Goal: Absence of fever/infection during neutropenic period  Description: INTERVENTIONS:  - Monitor WBC    Outcome: Progressing     Problem: SAFETY ADULT  Goal: Patient will remain free of falls  Description: INTERVENTIONS:  - Educate patient/family on patient safety including physical limitations  - Instruct patient to call for assistance with activity   - Consult OT/PT to assist with strengthening/mobility   - Keep Call bell within reach  - Keep bed low and locked with side rails adjusted as appropriate  - Keep care items and personal belongings within reach  - Initiate and maintain comfort rounds  - Make Fall Risk Sign visible to staff  - Offer Toileting every  Hours,  in advance of need  - Initiate/Maintain alarm  - Obtain necessary fall risk management equipment:   - Apply yellow socks and bracelet for high fall risk patients  - Consider moving patient to room near nurses station  Outcome: Progressing  Goal: Maintain or return to baseline ADL function  Description: INTERVENTIONS:  -  Assess patient's ability to carry out ADLs; assess patient's baseline for ADL function and identify physical deficits which impact ability to perform ADLs (bathing, care of mouth/teeth, toileting, grooming, dressing, etc.)  - Assess/evaluate cause of self-care deficits   - Assess range of motion  - Assess patient's mobility; develop plan if impaired  - Assess patient's need for assistive devices and provide as appropriate  - Encourage maximum independence but intervene and supervise when necessary  - Involve family in performance of ADLs  - Assess for home care needs following discharge   - Consider OT consult to assist with ADL evaluation and planning for discharge  - Provide patient education as appropriate  Outcome: Progressing  Goal: Maintains/Returns to pre admission functional level  Description: INTERVENTIONS:  - Perform AM-PAC 6 Click Basic Mobility/ Daily Activity assessment daily.  - Set and communicate daily mobility goal to care team and patient/family/caregiver.   - Collaborate with rehabilitation services on mobility goals if consulted  - Perform Range of Motion  times a day.  - Reposition patient every  hours.  - Dangle patient  times a day  - Stand patient  times a day  - Ambulate patient  times a day  - Out of bed to chair  times a day   - Out of bed for meals times a day  - Out of bed for toileting  - Record patient progress and toleration of activity level   Outcome: Progressing     Problem: DISCHARGE PLANNING  Goal: Discharge to home or other facility with appropriate resources  Description: INTERVENTIONS:  - Identify barriers to discharge w/patient and caregiver  - Arrange for  needed discharge resources and transportation as appropriate  - Identify discharge learning needs (meds, wound care, etc.)  - Arrange for interpretive services to assist at discharge as needed  - Refer to Case Management Department for coordinating discharge planning if the patient needs post-hospital services based on physician/advanced practitioner order or complex needs related to functional status, cognitive ability, or social support system  Outcome: Progressing     Problem: Knowledge Deficit  Goal: Patient/family/caregiver demonstrates understanding of disease process, treatment plan, medications, and discharge instructions  Description: Complete learning assessment and assess knowledge base.  Interventions:  - Provide teaching at level of understanding  - Provide teaching via preferred learning methods  Outcome: Progressing

## 2025-01-22 NOTE — PROGRESS NOTES
Progress Note - Infectious Disease   Name: Maximilian Mann 49 y.o. female I MRN: [id number]  Unit/Bed#: -01 I Date of Admission: 1/19/2025   Date of Service: 1/22/2025 I Hospital Day: 3    Assessment & Plan  SIRS (systemic inflammatory response syndrome) ([facility])  Mild tachycardia, tachypnea.  No fevers, leukocytosis.  Normal procalcitonin x 2.  Consider viral process based on patient's constellation of symptoms.  RP 2 is negative.  No new findings on chest CT, as below.  Seems to be clinically improving.  Admission blood cultures are negative.   Antibiotic plan as below   Follow temperatures and hemodynamics   Follow symptomatology   Recheck CBC in AM to assess potential infection   Follow-up pending CMV PCR.  Acute respiratory insufficiency  Patient presented with progressively worsening respiratory symptoms.  No fevers, leukocytosis, or procalcitonin elevation although we may not see such abnormalities given patient's immunosuppressed state.  Consider possibility of microaspiration in the setting of significantly dilated esophagus.  Less likely new pulmonary infection as current CT chest remains stable in comparison to prior CT from 2 months ago.  Patient has known history of chronic airway colonization including GBS, MRSA.  Sputum culture was sent but appears to be a poor sample.  Final sputum culture again with growth of GBS and Candida albicans consistent with chronic colonization.  Will plan to treat for short course given immunosuppressed state.  Symptoms seem to be improving   Change ceftriaxone to amoxicillin with plan for 7-day course.   Follow respiratory symptoms and O2 requirements  GEOFF (acute kidney injury) ([facility])  Consider secondary to recent poor oral intake as improved with IVF's.   Volume management per primary service   Check BMP in AM  Hx of lung transplant (HCC)  Patient previously underwent double lung transplant in 2013. She required retransplant of the left lung in 2017. Course was  then complicated by need for right-sided pneumonectomy in 2022 given chronic progressive infections. She remains on regimen with tacrolimus and prednisone. Prophylactic medications and dosing reviewed.    Continue atovaquone, Valtrex, azithromycin prophylaxis   Close follow-up with transplant center  Type 1 diabetes mellitus with diabetic chronic kidney disease, unspecified CKD stage (HCC)  Lab Results   Component Value Date    HGBA1C 7.8 (H) 12/16/2024       Recent Labs     01/21/25  1651 01/21/25  2210 01/22/25  0632 01/22/25  1132   POCGLU 344* 322* 150* 217*     With recent hemoglobin A1c of 7.8.  Potential risk factor for infection.  Blood sugar control per primary service.  Pancreatic insufficiency  On Creon chronically.  GERD (gastroesophageal reflux disease)  Patient has gastroparesis with poorly controlled GERD.  She did have previous fundoplication in 2023.  CT again with dilated esophagus.  Of note, doxycycline has previously exacerbated the symptoms.   Ongoing management per primary service        Antibiotics:  Ceftriaxone 4  Atovaquone/azithromycin/valacyclovir prophylaxis    I discussed above plan with Yolanda from primary service who agrees with transition to amoxicillin.        Subjective   No fevers. Still has significant pain with coughing making it difficult to expectorate mucous.     Objective :  Temp:  [98.6 °F (37 °C)-98.7 °F (37.1 °C)] 98.6 °F (37 °C)  HR:  [72] 72  BP: (110-148)/(71-80) 110/80  Resp:  [16-20] 16  O2 Device: None (Room air)    General:  No acute distress  HEENT atraumatic normocephalic  Neck trachea midline  Psychiatric:  Awake and alert  Pulmonary:  Normal respiratory excursion without accessory muscle use  Abdomen: Nondistended  Extremities:  No edema  Skin:  No rashes  Neuro moves all extremities spontaneously      Lab Results: I have reviewed the following results:  Results from last 7 days   Lab Units 01/20/25  0442 01/19/25  1249   WBC Thousand/uL 6.47 7.53   HEMOGLOBIN  g/dL 7.8* 8.4*   PLATELETS Thousands/uL 145* 157     Results from last 7 days   Lab Units 01/20/25  0442 01/19/25  1249   SODIUM mmol/L 135 140   POTASSIUM mmol/L 4.8 4.4   CHLORIDE mmol/L 98 94*   CO2 mmol/L 32 39*   BUN mg/dL 28* 38*   CREATININE mg/dL 1.05 1.33*   EGFR ml/min/1.73sq m 63 47   CALCIUM mg/dL 8.5 8.2*   AST U/L  --  17   ALT U/L  --  23   ALK PHOS U/L  --  70   ALBUMIN g/dL  --  3.5     Results from last 7 days   Lab Units 01/19/25  1556 01/19/25  1254 01/19/25  1249   BLOOD CULTURE   --  No Growth at 48 hrs. No Growth at 48 hrs.   SPUTUM CULTURE  3+ Growth of Beta Hemolytic Streptococcus Group B*  4+ Growth of Candida sp. presumptively albicans*  --   --    GRAM STAIN RESULT  2+ Epithelial cells per low power field*  2+ Polys*  2+ Gram positive cocci in pairs*  2+ Budding yeast*  --   --      Results from last 7 days   Lab Units 01/20/25  0442 01/19/25  1249   PROCALCITONIN ng/ml <0.05 0.06             Results from last 7 days   Lab Units 01/19/25  1249   D-DIMER QUANTITATIVE ug/ml FEU 1.12*

## 2025-01-22 NOTE — ASSESSMENT & PLAN NOTE
Patient presented with progressively worsening respiratory symptoms.  No fevers, leukocytosis, or procalcitonin elevation although we may not see such abnormalities given patient's immunosuppressed state.  Consider possibility of microaspiration in the setting of significantly dilated esophagus.  Less likely new pulmonary infection as current CT chest remains stable in comparison to prior CT from 2 months ago.  Patient has known history of chronic airway colonization including GBS, MRSA.  Sputum culture was sent but appears to be a poor sample.  Final sputum culture again with growth of GBS and Candida albicans consistent with chronic colonization.  Will plan to treat for short course given immunosuppressed state.  Symptoms seem to be improving   Change ceftriaxone to amoxicillin with plan for 7-day course.   Follow respiratory symptoms and O2 requirements

## 2025-01-22 NOTE — PROGRESS NOTES
Progress Note - Hospitalist   Name: Maximilian Mann 49 y.o. female I MRN: [id number]  Unit/Bed#: -01 I Date of Admission: 1/19/2025   Date of Service: 1/22/2025 I Hospital Day: 3    Assessment & Plan  Hx of lung transplant (HCC)  Secondary to cystic fibrosis; Followed by [facility], s/p bilat lung transplant 2013, subsequent rejection and severe bronchiolitis obliterans   s/p second left lung transplant 2017   s/p right pneumonectomy  Has had recurrent admissions for pneumonia and Pseudomonas/MSSA   Patient currently taking tacrolimus 3 mg twice a day and prednisone 20 mg daily  Patient takes for prophylaxis azithromycin 250 mg 3 times a week MWF, atovaquone 1500 milligrams daily for PJP prophylaxis and Valtrex 500 mg daily  Cough  Patient presentation due to sore throat, persistent cough, shortness of breath, wheezing, pleuritic chest pain starting since Friday 1/17.  Patient reports the symptoms are getting worse despite usual treatment with mucinex, nebulizers, OTC cough meds  CT chest appears similar to Nov '24 - no definitive infiltrate, but notes inflammatory tree-in-bud appearance  ID consulted for guidance due to transplant status and recurrence of symptoms  Procalcitonin normal x2 (was elevated Nov '24)  FLU/COVID/RSV/RP2 all negative   Continue respiratory protocol, supplemental O2 prn  Mucinex, albuterol, hycodan prn  Patient with reported uncontrolled pain, will start morphine ER and cut back dilaudid IV   SIRS (systemic inflammatory response syndrome) ([facility])  Tachycardia, tachypnea. She remains afebrile and without leukocytosis or elevated procalcitonin. RP2 negative.  ID consulted, no clear new infectious etiology  Sputum GBS and candida, appears to be colonized   Likely to switch to oral abx to complete 7 days total treatment out of precaution in setting of immunosuppressed state   Type 1 diabetes mellitus with diabetic chronic kidney disease, unspecified CKD stage ([facility])  Blood Sugar Average:  Last 72 hrs:(P) [id number]  Continue Lantus 12 units in the morning   Continue humalog 10 units with meals  Sliding scale coverage  Hyperglycemia due to extra dose steroids 1/20  Hypoglycemia protocol  Diabetic diet  Pancreatic insufficiency  Continue Creon  GEOFF (acute kidney injury) (HCC)  Cr 1.3 on admission, baseline Cr 0.8-1.1  Suspected prerenal due to possible acute viral illness?   Cr improved with IV fluids, will hold on further due to patient swelling/fluid retention  Restart lasix 1/20  BMP not collected   GERD (gastroesophageal reflux disease)  History of severe GERD status post fundoplication in 2023   Patient currently taking dexlansoprazole 60 mg daily and famotidine 40 mg daily at bedtime  Maalox ordered as needed for acid reflux  Add additional pantoprazole 40 mg am  History of DVT (deep vein thrombosis)  Continue with Eliquis 5 mg twice a day.  Pleuritic chest pain  Mucinex, oxycodone, dilaudid, hycodan  Lactic acidosis  Elevated lactic acid 2.7 and improved with IV fluids to 2.5, suspect type B lactic acid elevation secondary to albuterol  No evidence of end-organ damage (barely meets GEOFF criteria, no definitive infection)  Acute respiratory insufficiency      VTE Pharmacologic Prophylaxis: VTE Score: 3 Moderate Risk (Score 3-4) - Pharmacological DVT Prophylaxis Ordered: apixaban (Eliquis).    Mobility:   Basic Mobility Inpatient Raw Score: 23  JH-HLM Goal: 7: Walk 25 feet or more  JH-HLM Achieved: 6: Walk 10 steps or more  JH-HLM Goal NOT achieved. Continue with multidisciplinary rounding and encourage appropriate mobility to improve upon JH-HLM goals.    Patient Centered Rounds: I performed bedside rounds with nursing staff today.   Discussions with Specialists or Other Care Team Provider: WESLY MCCARTHY; message out to [facility] to discuss current plan of care [phone]     Education and Discussions with Family / Patient: Patient declined call to .     Current Length of Stay: 3  "day(s)  Current Patient Status: Inpatient   Certification Statement: The patient will continue to require additional inpatient hospital stay due to ongoing pain control in setting of acute exacerbation of underlying CF chronic lung issues   Discharge Plan:  tbd    Code Status: Level 1 - Full Code    Subjective   Patient in tears today, reports pain is uncontrolled. We did discuss nature of pain medications which are \"prn\" and no \"scheduled\". Offered long-acting pain control, agreeable. Discussed risk of respiratory depression, patient aware and accepting of risk. No fevers or chills, but coughing up yucky looking phlegm. Requests update to [facility].     Objective :  Temp:  [98.6 °F (37 °C)-98.7 °F (37.1 °C)] 98.6 °F (37 °C)  HR:  [72] 72  BP: (110-148)/(71-80) 110/80  Resp:  [16-20] 16  O2 Device: None (Room air)    Body mass index is 24.31 kg/m².     Input and Output Summary (last 24 hours):   No intake or output data in the 24 hours ending 01/22/25 0917    Physical Exam  Vitals and nursing note reviewed.   Constitutional:       General: She is awake. She is not in acute distress.     Appearance: Normal appearance. She is not ill-appearing or toxic-appearing.   Cardiovascular:      Rate and Rhythm: Normal rate and regular rhythm.      Heart sounds: Normal heart sounds.   Pulmonary:      Effort: Pulmonary effort is normal. No respiratory distress.      Breath sounds: Examination of the left-upper field reveals decreased breath sounds. Examination of the left-middle field reveals decreased breath sounds. Examination of the left-lower field reveals decreased breath sounds. Decreased breath sounds present. No wheezing.      Comments: Shallow breathing, not labored, no hypoxia; absent right lung sounds due to pneumonectomy   Abdominal:      General: Bowel sounds are normal. There is no distension.      Palpations: Abdomen is soft.      Tenderness: There is no abdominal tenderness.   Skin:     General: Skin is warm and dry. "      Coloration: Skin is not pale.   Neurological:      Mental Status: She is alert and oriented to person, place, and time.   Psychiatric:         Mood and Affect: Mood normal. Affect is tearful.         Behavior: Behavior normal. Behavior is cooperative.           Lines/Drains:  Lines/Drains/Airways       Active Status       Name Placement date Placement time Site Days    Port A Cath 11/25/22 Right Chest 11/25/22  0953  Chest  788                    Central Line:  Goal for removal: Port accessed. Will de-access as appropriate.               Lab Results: I have reviewed the following results:   Results from last 7 days   Lab Units 01/20/25  0442 01/19/25  1249   WBC Thousand/uL 6.47 7.53   HEMOGLOBIN g/dL 7.8* 8.4*   HEMATOCRIT % 29.2* 30.5*   PLATELETS Thousands/uL 145* 157   SEGS PCT %  --  76*   LYMPHO PCT %  --  11*   MONO PCT %  --  11   EOS PCT %  --  1     Results from last 7 days   Lab Units 01/20/25  0442 01/19/25  1249   SODIUM mmol/L 135 140   POTASSIUM mmol/L 4.8 4.4   CHLORIDE mmol/L 98 94*   CO2 mmol/L 32 39*   BUN mg/dL 28* 38*   CREATININE mg/dL 1.05 1.33*   ANION GAP mmol/L 5 7   CALCIUM mg/dL 8.5 8.2*   ALBUMIN g/dL  --  3.5   TOTAL BILIRUBIN mg/dL  --  0.40   ALK PHOS U/L  --  70   ALT U/L  --  23   AST U/L  --  17   GLUCOSE RANDOM mg/dL 122 181*     Results from last 7 days   Lab Units 01/19/25  1249   INR  1.16     Results from last 7 days   Lab Units 01/22/25  0632 01/21/25  2210 01/21/25  1651 01/21/25  1042 01/21/25  0622 01/21/25  0221 01/21/25  0059 01/20/25  2048 01/20/25  1622 01/20/25  1219 01/20/25  0730 01/19/25  2104   POC GLUCOSE mg/dl 150* 322* 344* 159* 132 294* 460* 476* 328* 206* 122 236*         Results from last 7 days   Lab Units 01/20/25  0442 01/19/25  1555 01/19/25  1249   LACTIC ACID mmol/L  --  2.5* 2.7*   PROCALCITONIN ng/ml <0.05  --  0.06       Recent Cultures (last 7 days):   Results from last 7 days   Lab Units 01/19/25  1556 01/19/25  1254 01/19/25  1249   BLOOD  CULTURE   --  No Growth at 48 hrs. No Growth at 48 hrs.   SPUTUM CULTURE  3+ Growth of Beta Hemolytic Streptococcus Group B*  4+ Growth of Yeast species*  --   --    GRAM STAIN RESULT  2+ Epithelial cells per low power field*  2+ Polys*  2+ Gram positive cocci in pairs*  2+ Budding yeast*  --   --        Imaging Results Review: No pertinent imaging studies reviewed.  Other Study Results Review: No additional pertinent studies reviewed.    Last 24 Hours Medication List:     Current Facility-Administered Medications:     acetaminophen (TYLENOL) tablet 975 mg, Q6H PRN    ALPRAZolam (XANAX) tablet 1 mg, HS PRN    aluminum-magnesium hydroxide-simethicone (MAALOX) oral suspension 30 mL, Q6H PRN    apixaban (ELIQUIS) tablet 5 mg, BID    ascorbic acid (VITAMIN C) tablet 250 mg, Daily    atovaquone (MEPRON) oral suspension 1,500 mg, Daily    azithromycin (ZITHROMAX) tablet 250 mg, Once per day on Monday Wednesday Friday    bisacodyl (DULCOLAX) EC tablet 5 mg, HS    budesonide-formoterol (SYMBICORT) 160-4.5 mcg/act inhaler 2 puff, Q12H    ceftriaxone (ROCEPHIN) 1 g/50 mL in dextrose IVPB, Q24H, Last Rate: 1,000 mg (01/21/25 1937)    Cholecalciferol (VITAMIN D3) tablet 5,000 Units, Daily    cyanocobalamin (VITAMIN B-12) tablet 1,000 mcg, Daily    famotidine (PEPCID) tablet 40 mg, HS    furosemide (LASIX) tablet 40 mg, Daily    guaiFENesin (MUCINEX) 12 hr tablet 1,200 mg, Q12H DOTTY    HYDROcodone Bit-Homatrop MBr (HYCODAN) oral syrup 5 mL, Q4H PRN    HYDROmorphone (DILAUDID) injection 1 mg, Q2H PRN    insulin glargine (LANTUS) subcutaneous injection 12 Units 0.12 mL, QAM    insulin lispro (HumALOG/ADMELOG) 100 units/mL subcutaneous injection 1-5 Units, TID AC **AND** Fingerstick Glucose (POCT), 4x Daily AC and at bedtime    insulin lispro (HumALOG/ADMELOG) 100 units/mL subcutaneous injection 1-5 Units, HS    ipratropium-albuterol (DUO-NEB) 0.5-2.5 mg/3 mL inhalation solution 3 mL, Q6H PRN    lidocaine (LIDODERM) 5 % patch 1  patch, Daily PRN    magnesium gluconate (MAGONATE) tablet 500 mg, BID    naloxone (NARCAN) 0.04 mg/mL syringe 0.04 mg, Q1MIN PRN    ondansetron (ZOFRAN) injection 4 mg, Q6H PRN    oxybutynin (DITROPAN-XL) 24 hr tablet 15 mg, HS    oxyCODONE (ROXICODONE) IR tablet 5 mg, Q4H PRN **OR** oxyCODONE (ROXICODONE) immediate release tablet 10 mg, Q4H PRN    pancrelipase (Lip-Prot-Amyl) (CREON) delayed release capsule 48,000 Units, TID With Meals    pantoprazole (PROTONIX) EC tablet 40 mg, BID AC    polyethylene glycol (MIRALAX) packet 17 g, BID    predniSONE tablet 20 mg, Daily    pregabalin (LYRICA) capsule 150 mg, BID    tacrolimus (PROGRAF) capsule 3 mg, BID    valACYclovir (VALTREX) tablet 500 mg, Q24H    Administrative Statements   Today, Patient Was Seen By: Yolanda Ferrara PA-C  I have spent a total time of 45 minutes in caring for this patient on the day of the visit/encounter including Risks and benefits of tx options, Instructions for management, Patient and family education, Counseling / Coordination of care, Documenting in the medical record, Reviewing / ordering tests, medicine, procedures  , and Communicating with other healthcare professionals .    **Please Note: This note may have been constructed using a voice recognition system.**

## 2025-01-22 NOTE — ASSESSMENT & PLAN NOTE
Mild tachycardia, tachypnea.  No fevers, leukocytosis.  Normal procalcitonin x 2.  Consider viral process based on patient's constellation of symptoms.  RP 2 is negative.  No new findings on chest CT, as below.  Seems to be clinically improving.  Admission blood cultures are negative.   Antibiotic plan as below   Follow temperatures and hemodynamics   Follow symptomatology   Recheck CBC in AM to assess potential infection   Follow-up pending CMV PCR.

## 2025-01-23 LAB
GLUCOSE SERPL-MCNC: 140 MG/DL (ref 65–140)
GLUCOSE SERPL-MCNC: 300 MG/DL (ref 65–140)
GLUCOSE SERPL-MCNC: 327 MG/DL (ref 65–140)
GLUCOSE SERPL-MCNC: 489 MG/DL (ref 65–140)

## 2025-01-23 PROCEDURE — G0545 PR INHERENT VISIT TO INPT: HCPCS | Performed by: INTERNAL MEDICINE

## 2025-01-23 PROCEDURE — 82948 REAGENT STRIP/BLOOD GLUCOSE: CPT

## 2025-01-23 PROCEDURE — 99232 SBSQ HOSP IP/OBS MODERATE 35: CPT | Performed by: INTERNAL MEDICINE

## 2025-01-23 PROCEDURE — 99232 SBSQ HOSP IP/OBS MODERATE 35: CPT | Performed by: PHYSICIAN ASSISTANT

## 2025-01-23 RX ORDER — FUROSEMIDE 10 MG/ML
40 INJECTION INTRAMUSCULAR; INTRAVENOUS ONCE
Status: COMPLETED | OUTPATIENT
Start: 2025-01-23 | End: 2025-01-23

## 2025-01-23 RX ORDER — INSULIN GLARGINE 100 [IU]/ML
3 INJECTION, SOLUTION SUBCUTANEOUS ONCE
Status: COMPLETED | OUTPATIENT
Start: 2025-01-23 | End: 2025-01-23

## 2025-01-23 RX ORDER — INSULIN LISPRO 100 [IU]/ML
5 INJECTION, SOLUTION INTRAVENOUS; SUBCUTANEOUS
Status: DISCONTINUED | OUTPATIENT
Start: 2025-01-23 | End: 2025-01-24

## 2025-01-23 RX ADMIN — HYDROMORPHONE HYDROCHLORIDE 1 MG: 1 INJECTION, SOLUTION INTRAMUSCULAR; INTRAVENOUS; SUBCUTANEOUS at 19:42

## 2025-01-23 RX ADMIN — NYSTATIN 500000 UNITS: 100000 SUSPENSION ORAL at 12:00

## 2025-01-23 RX ADMIN — PREDNISONE 20 MG: 20 TABLET ORAL at 08:29

## 2025-01-23 RX ADMIN — TACROLIMUS 3 MG: 1 CAPSULE ORAL at 08:25

## 2025-01-23 RX ADMIN — CYANOCOBALAMIN TAB 500 MCG 1000 MCG: 500 TAB at 08:26

## 2025-01-23 RX ADMIN — AMOXICILLIN 500 MG: 250 CAPSULE ORAL at 14:29

## 2025-01-23 RX ADMIN — BUDESONIDE AND FORMOTEROL FUMARATE DIHYDRATE 2 PUFF: 160; 4.5 AEROSOL RESPIRATORY (INHALATION) at 23:13

## 2025-01-23 RX ADMIN — Medication 500 MG: at 17:49

## 2025-01-23 RX ADMIN — GUAIFENESIN 1200 MG: 600 TABLET ORAL at 08:26

## 2025-01-23 RX ADMIN — ATOVAQUONE 1500 MG: 750 SUSPENSION ORAL at 08:25

## 2025-01-23 RX ADMIN — POLYETHYLENE GLYCOL 3350 17 G: 17 POWDER, FOR SOLUTION ORAL at 17:49

## 2025-01-23 RX ADMIN — HYDROMORPHONE HYDROCHLORIDE 1 MG: 1 INJECTION, SOLUTION INTRAMUSCULAR; INTRAVENOUS; SUBCUTANEOUS at 12:01

## 2025-01-23 RX ADMIN — OXYCODONE HYDROCHLORIDE 10 MG: 10 TABLET ORAL at 18:35

## 2025-01-23 RX ADMIN — PREGABALIN 150 MG: 75 CAPSULE ORAL at 08:26

## 2025-01-23 RX ADMIN — INSULIN GLARGINE 3 UNITS: 100 INJECTION, SOLUTION SUBCUTANEOUS at 16:55

## 2025-01-23 RX ADMIN — INSULIN LISPRO 3 UNITS: 100 INJECTION, SOLUTION INTRAVENOUS; SUBCUTANEOUS at 21:47

## 2025-01-23 RX ADMIN — FUROSEMIDE 40 MG: 40 TABLET ORAL at 08:26

## 2025-01-23 RX ADMIN — PANCRELIPASE 48000 UNITS: 120000; 24000; 76000 CAPSULE, DELAYED RELEASE PELLETS ORAL at 08:25

## 2025-01-23 RX ADMIN — OXYCODONE HYDROCHLORIDE AND ACETAMINOPHEN 250 MG: 500 TABLET ORAL at 08:26

## 2025-01-23 RX ADMIN — MORPHINE SULFATE 15 MG: 15 TABLET, EXTENDED RELEASE ORAL at 21:45

## 2025-01-23 RX ADMIN — AMOXICILLIN 500 MG: 250 CAPSULE ORAL at 21:45

## 2025-01-23 RX ADMIN — FAMOTIDINE 40 MG: 20 TABLET, FILM COATED ORAL at 21:45

## 2025-01-23 RX ADMIN — INSULIN LISPRO 5 UNITS: 100 INJECTION, SOLUTION INTRAVENOUS; SUBCUTANEOUS at 16:46

## 2025-01-23 RX ADMIN — APIXABAN 5 MG: 5 TABLET, FILM COATED ORAL at 08:42

## 2025-01-23 RX ADMIN — VALACYCLOVIR HYDROCHLORIDE 500 MG: 500 TABLET, FILM COATED ORAL at 08:26

## 2025-01-23 RX ADMIN — PANTOPRAZOLE SODIUM 40 MG: 40 TABLET, DELAYED RELEASE ORAL at 16:40

## 2025-01-23 RX ADMIN — FUROSEMIDE 40 MG: 10 INJECTION, SOLUTION INTRAMUSCULAR; INTRAVENOUS at 14:28

## 2025-01-23 RX ADMIN — PREGABALIN 150 MG: 75 CAPSULE ORAL at 17:50

## 2025-01-23 RX ADMIN — GUAIFENESIN 1200 MG: 600 TABLET ORAL at 21:44

## 2025-01-23 RX ADMIN — OXYCODONE HYDROCHLORIDE 10 MG: 10 TABLET ORAL at 02:37

## 2025-01-23 RX ADMIN — INSULIN GLARGINE 12 UNITS: 100 INJECTION, SOLUTION SUBCUTANEOUS at 08:28

## 2025-01-23 RX ADMIN — PANCRELIPASE 48000 UNITS: 120000; 24000; 76000 CAPSULE, DELAYED RELEASE PELLETS ORAL at 16:40

## 2025-01-23 RX ADMIN — MORPHINE SULFATE 15 MG: 15 TABLET, EXTENDED RELEASE ORAL at 14:29

## 2025-01-23 RX ADMIN — PANTOPRAZOLE SODIUM 40 MG: 40 TABLET, DELAYED RELEASE ORAL at 05:42

## 2025-01-23 RX ADMIN — BUDESONIDE AND FORMOTEROL FUMARATE DIHYDRATE 2 PUFF: 160; 4.5 AEROSOL RESPIRATORY (INHALATION) at 12:08

## 2025-01-23 RX ADMIN — TACROLIMUS 3 MG: 1 CAPSULE ORAL at 17:50

## 2025-01-23 RX ADMIN — Medication 5000 UNITS: at 08:26

## 2025-01-23 RX ADMIN — NYSTATIN 500000 UNITS: 100000 SUSPENSION ORAL at 17:49

## 2025-01-23 RX ADMIN — BISACODYL 5 MG: 5 TABLET, COATED ORAL at 21:45

## 2025-01-23 RX ADMIN — Medication 500 MG: at 08:27

## 2025-01-23 RX ADMIN — ALPRAZOLAM 1 MG: 0.5 TABLET ORAL at 01:04

## 2025-01-23 RX ADMIN — INSULIN LISPRO 3 UNITS: 100 INJECTION, SOLUTION INTRAVENOUS; SUBCUTANEOUS at 12:01

## 2025-01-23 RX ADMIN — NYSTATIN 500000 UNITS: 100000 SUSPENSION ORAL at 08:34

## 2025-01-23 RX ADMIN — OXYCODONE HYDROCHLORIDE 10 MG: 10 TABLET ORAL at 09:55

## 2025-01-23 RX ADMIN — PANCRELIPASE 48000 UNITS: 120000; 24000; 76000 CAPSULE, DELAYED RELEASE PELLETS ORAL at 12:00

## 2025-01-23 RX ADMIN — AMOXICILLIN 500 MG: 250 CAPSULE ORAL at 05:42

## 2025-01-23 RX ADMIN — NYSTATIN 500000 UNITS: 100000 SUSPENSION ORAL at 21:50

## 2025-01-23 RX ADMIN — APIXABAN 5 MG: 5 TABLET, FILM COATED ORAL at 17:48

## 2025-01-23 RX ADMIN — MORPHINE SULFATE 15 MG: 15 TABLET, EXTENDED RELEASE ORAL at 05:42

## 2025-01-23 RX ADMIN — POLYETHYLENE GLYCOL 3350 17 G: 17 POWDER, FOR SOLUTION ORAL at 08:25

## 2025-01-23 RX ADMIN — ALPRAZOLAM 1 MG: 0.5 TABLET ORAL at 23:13

## 2025-01-23 RX ADMIN — OXYBUTYNIN CHLORIDE 15 MG: 5 TABLET, EXTENDED RELEASE ORAL at 21:44

## 2025-01-23 NOTE — PLAN OF CARE
Problem: PAIN - ADULT  Goal: Verbalizes/displays adequate comfort level or baseline comfort level  Description: Interventions:  - Encourage patient to monitor pain and request assistance  - Assess pain using appropriate pain scale  - Administer analgesics based on type and severity of pain and evaluate response  - Implement non-pharmacological measures as appropriate and evaluate response  - Consider cultural and social influences on pain and pain management  - Notify physician/advanced practitioner if interventions unsuccessful or patient reports new pain  Outcome: Progressing     Problem: INFECTION - ADULT  Goal: Absence or prevention of progression during hospitalization  Description: INTERVENTIONS:  - Assess and monitor for signs and symptoms of infection  - Monitor lab/diagnostic results  - Monitor all insertion sites, i.e. indwelling lines, tubes, and drains  - Monitor endotracheal if appropriate and nasal secretions for changes in amount and color  - Bailey appropriate cooling/warming therapies per order  - Administer medications as ordered  - Instruct and encourage patient and family to use good hand hygiene technique  - Identify and instruct in appropriate isolation precautions for identified infection/condition  Outcome: Progressing  Goal: Absence of fever/infection during neutropenic period  Description: INTERVENTIONS:  - Monitor WBC    Outcome: Progressing     Problem: SAFETY ADULT  Goal: Patient will remain free of falls  Description: INTERVENTIONS:  - Educate patient/family on patient safety including physical limitations  - Instruct patient to call for assistance with activity   - Consult OT/PT to assist with strengthening/mobility   - Keep Call bell within reach  - Keep bed low and locked with side rails adjusted as appropriate  - Keep care items and personal belongings within reach  - Initiate and maintain comfort rounds  - Make Fall Risk Sign visible to staff  - Offer Toileting every  Hours,  in advance of need  - Initiate/Maintain alarm  - Obtain necessary fall risk management equipment:   - Apply yellow socks and bracelet for high fall risk patients  - Consider moving patient to room near nurses station  Outcome: Progressing  Goal: Maintain or return to baseline ADL function  Description: INTERVENTIONS:  -  Assess patient's ability to carry out ADLs; assess patient's baseline for ADL function and identify physical deficits which impact ability to perform ADLs (bathing, care of mouth/teeth, toileting, grooming, dressing, etc.)  - Assess/evaluate cause of self-care deficits   - Assess range of motion  - Assess patient's mobility; develop plan if impaired  - Assess patient's need for assistive devices and provide as appropriate  - Encourage maximum independence but intervene and supervise when necessary  - Involve family in performance of ADLs  - Assess for home care needs following discharge   - Consider OT consult to assist with ADL evaluation and planning for discharge  - Provide patient education as appropriate  Outcome: Progressing  Goal: Maintains/Returns to pre admission functional level  Description: INTERVENTIONS:  - Perform AM-PAC 6 Click Basic Mobility/ Daily Activity assessment daily.  - Set and communicate daily mobility goal to care team and patient/family/caregiver.   - Collaborate with rehabilitation services on mobility goals if consulted  - Perform Range of Motion  times a day.  - Reposition patient every  hours.  - Dangle patient  times a day  - Stand patient  times a day  - Ambulate patient  times a day  - Out of bed to chair  times a day   - Out of bed for meals  times a day  - Out of bed for toileting  - Record patient progress and toleration of activity level   Outcome: Progressing     Problem: DISCHARGE PLANNING  Goal: Discharge to home or other facility with appropriate resources  Description: INTERVENTIONS:  - Identify barriers to discharge w/patient and caregiver  - Arrange for  needed discharge resources and transportation as appropriate  - Identify discharge learning needs (meds, wound care, etc.)  - Arrange for interpretive services to assist at discharge as needed  - Refer to Case Management Department for coordinating discharge planning if the patient needs post-hospital services based on physician/advanced practitioner order or complex needs related to functional status, cognitive ability, or social support system  Outcome: Progressing     Problem: Knowledge Deficit  Goal: Patient/family/caregiver demonstrates understanding of disease process, treatment plan, medications, and discharge instructions  Description: Complete learning assessment and assess knowledge base.  Interventions:  - Provide teaching at level of understanding  - Provide teaching via preferred learning methods  Outcome: Progressing     Problem: RESPIRATORY - ADULT  Goal: Achieves optimal ventilation and oxygenation  Description: INTERVENTIONS:  - Assess for changes in respiratory status  - Assess for changes in mentation and behavior  - Position to facilitate oxygenation and minimize respiratory effort  - Oxygen administered by appropriate delivery if ordered  - Initiate smoking cessation education as indicated  - Encourage broncho-pulmonary hygiene including cough, deep breathe, Incentive Spirometry  - Assess the need for suctioning and aspirate as needed  - Assess and instruct to report SOB or any respiratory difficulty  - Respiratory Therapy support as indicated  Outcome: Progressing

## 2025-01-23 NOTE — ASSESSMENT & PLAN NOTE
Tachycardia, tachypnea. She remains afebrile and without leukocytosis or elevated procalcitonin. RP2 negative.  ID consulted, no clear new infectious etiology  Sputum GBS and candida, appears to be colonized   Ceftriaxone x3 days, switched to amoxicillin through 1/25  Nystatin swish/swallow per patient request

## 2025-01-23 NOTE — ASSESSMENT & PLAN NOTE
Cr 1.3 on admission, baseline Cr 0.8-1.1  Suspected prerenal due to possible acute viral illness?   Cr improved with IV fluids, will hold on further due to patient swelling/fluid retention  Will give 1x lasix IV now due to swelling 1/23  BMP in am

## 2025-01-23 NOTE — PROGRESS NOTES
Progress Note - Infectious Disease   Name: Maximilian Mann 49 y.o. female I MRN: [id number]  Unit/Bed#: -01 I Date of Admission: 1/19/2025   Date of Service: 1/23/2025 I Hospital Day: 4    Assessment & Plan  SIRS (systemic inflammatory response syndrome) (HCC)  Mild tachycardia, tachypnea.  No fevers, leukocytosis.  Normal procalcitonin x 2.  Consider viral process based on patient's constellation of symptoms.  RP 2 is negative.  No new findings on chest CT, as below.  Seems to be clinically improving.  Admission blood cultures are negative.   Antibiotic plan as below   Follow temperatures and hemodynamics   Follow symptomatology   Recheck CBC in AM to assess potential infection  Acute respiratory insufficiency  Patient presented with progressively worsening respiratory symptoms.  No fevers, leukocytosis, or procalcitonin elevation although we may not see such abnormalities given patient's immunosuppressed state.  Consider possibility of microaspiration in the setting of significantly dilated esophagus.  Less likely new pulmonary infection as current CT chest remains stable in comparison to prior CT from 2 months ago.  Patient has known history of chronic airway colonization including GBS, MRSA.  Sputum culture was sent but appears to be a poor sample.  Final sputum culture again with growth of GBS and Candida albicans consistent with chronic colonization.  Will plan to treat for short course given immunosuppressed state.  Symptoms seem to be improving   Continue amoxicillin with plan for 7-day course, through 1/25.   Follow respiratory symptoms and O2 requirements  GEOFF (acute kidney injury) (HCC)  Consider secondary to recent poor oral intake as improved with IVF's.   Volume management per primary service   Check BMP in AM  Hx of lung transplant (HCC)  Patient previously underwent double lung transplant in 2013. She required retransplant of the left lung in 2017. Course was then complicated by need for  right-sided pneumonectomy in 2022 given chronic progressive infections. She remains on regimen with tacrolimus and prednisone. Prophylactic medications and dosing reviewed.    Continue atovaquone, Valtrex, azithromycin prophylaxis   Close follow-up with transplant center  Type 1 diabetes mellitus with diabetic chronic kidney disease, unspecified CKD stage (HCC)  Lab Results   Component Value Date    HGBA1C 7.8 (H) 12/16/2024       Recent Labs     01/22/25  1700 01/22/25  2124 01/23/25  0616 01/23/25  1051   POCGLU 390* 327* 140 327*     With recent hemoglobin A1c of 7.8.  Potential risk factor for infection.  Blood sugar control per primary service.  Pancreatic insufficiency  On Creon chronically.  GERD (gastroesophageal reflux disease)  Patient has gastroparesis with poorly controlled GERD.  She did have previous fundoplication in 2023.  CT again with dilated esophagus.  Of note, doxycycline has previously exacerbated the symptoms.   Ongoing management per primary service        Antibiotics:  Antibiotic 5  Amoxicillin  Atovaquone/azithromycin/valacyclovir prophylaxis    I discussed above plan with primary service who agrees with continuation of current abx.      Subjective   Overall had a better night. Pain is better controlled. No fevers. Ambulating around the room.    Objective :  Temp:  [98.1 °F (36.7 °C)-98.4 °F (36.9 °C)] 98.4 °F (36.9 °C)  HR:  [80] 80  BP: (132-138)/(72-81) 134/72  Resp:  [17-20] 18  SpO2:  [97 %] 97 %  O2 Device: None (Room air)    General:  No acute distress  HEENT ATNC  Neck trachea midline  Psychiatric:  Awake and alert  Pulmonary:  Normal respiratory excursion without accessory muscle use  Abdomen:  Soft, nontender  Extremities:  No edema  Skin:  No rashes  Neuro moves all exts      Lab Results: I have reviewed the following results:  Results from last 7 days   Lab Units 01/20/25  0442 01/19/25  1249   WBC Thousand/uL 6.47 7.53   HEMOGLOBIN g/dL 7.8* 8.4*   PLATELETS Thousands/uL 145*  157     Results from last 7 days   Lab Units 01/20/25  0442 01/19/25  1249   SODIUM mmol/L 135 140   POTASSIUM mmol/L 4.8 4.4   CHLORIDE mmol/L 98 94*   CO2 mmol/L 32 39*   BUN mg/dL 28* 38*   CREATININE mg/dL 1.05 1.33*   EGFR ml/min/1.73sq m 63 47   CALCIUM mg/dL 8.5 8.2*   AST U/L  --  17   ALT U/L  --  23   ALK PHOS U/L  --  70   ALBUMIN g/dL  --  3.5     Results from last 7 days   Lab Units 01/19/25  1556 01/19/25  1254 01/19/25  1249   BLOOD CULTURE   --  No Growth at 72 hrs. No Growth at 72 hrs.   SPUTUM CULTURE  3+ Growth of Beta Hemolytic Streptococcus Group B*  4+ Growth of Candida sp. presumptively albicans*  --   --    GRAM STAIN RESULT  2+ Epithelial cells per low power field*  2+ Polys*  2+ Gram positive cocci in pairs*  2+ Budding yeast*  --   --      Results from last 7 days   Lab Units 01/20/25  0442 01/19/25  1249   PROCALCITONIN ng/ml <0.05 0.06             Results from last 7 days   Lab Units 01/19/25  1249   D-DIMER QUANTITATIVE ug/ml FEU 1.12*

## 2025-01-23 NOTE — ASSESSMENT & PLAN NOTE
Pain uncontrolled, now better with long-acting regimen. Continue:  Morphine ER 15 mg TID  Oxycodone 5 mg q4h prn moderate pain  Oxycodone 10 mg q4h prn severe pain  Dilaudid 1 mg q4h prn breakthrough pain  Tylenol prn  Hycodan prn

## 2025-01-23 NOTE — ASSESSMENT & PLAN NOTE
Blood Sugar Average: Last 72 hrs:(P) [id number]  Continue Lantus 12 units in the morning   Continue humalog 10 units with meals  Sliding scale coverage  Hypoglycemia protocol  Diabetic diet

## 2025-01-23 NOTE — ASSESSMENT & PLAN NOTE
Lab Results   Component Value Date    HGBA1C 7.8 (H) 12/16/2024       Recent Labs     01/22/25  1700 01/22/25  2124 01/23/25  0616 01/23/25  1051   POCGLU 390* 327* 140 327*     With recent hemoglobin A1c of 7.8.  Potential risk factor for infection.  Blood sugar control per primary service.

## 2025-01-23 NOTE — CASE MANAGEMENT
Case Management Assessment & Discharge Planning Note    Patient name Maximilian Mann  Location /-01 MRN [id number]  : 1975 Date 2025       Current Admission Date: 2025  Current Admission Diagnosis:Cough   Patient Active Problem List    Diagnosis Date Noted Date Diagnosed    Acute respiratory insufficiency 2025     Cough 2025     Lactic acidosis 2025     Ureteral stent displacement ([facility]) 2024     Flank pain 2024     Thrombocytopenia ([facility]) 2024     Positive culture findings in sputum 2024     Pleuritic chest pain 2024     Pancytopenia, acquired ([facility]) 10/24/2024     SIRS (systemic inflammatory response syndrome) ([facility]) 10/23/2024     History of pulmonary embolus (PE) 2024     History of DVT (deep vein thrombosis) 2024     Sore throat 2024     Vaginal yeast infection 2024     Intestinal metaplasia of antrum of stomach without dysplasia 2024     Hx of lung transplant ([facility]) 01/15/2024     Continuous opioid dependence ([facility]) 01/15/2024     Stage 3a chronic kidney disease ([facility]) 2023     Depressed mood 2023     Chronic pancreatitis, unspecified pancreatitis type ([facility]) 2023     Pulmonary embolus (HCC) 2022     Right leg pain 10/13/2022     Chronic cough 2022     Renal calculus, left 2022     Pulmonary nodules 2022     Rib pain 2022     Gastroparesis 2022     Ulcerative pancolitis without complication ([facility]) 2022     Neuropathic pain of right foot 01/10/2022     Weakness of right lower extremity 2021     Pulmonary hypertension (HCC) 2021     Cystic fibrosis (HCC) 2021     Essential hypertension 2021     Hypercholesteremia 2021     Long term current use of bisphosphonates 2020     Long term current use of systemic steroids 2020     Osteopenia 2020     Chronic pain disorder 2020     Asthma 2020      Nausea and vomiting 02/04/2020     Hiatal hernia 06/25/2019     Anemia 07/09/2018     DVT (deep venous thrombosis) ([facility]) 06/29/2018     GEOFF (acute kidney injury) ([facility]) 09/08/2017     Immunosuppression ([facility]) 03/12/2017     Colon polyp, personal history 07/18/2016     S/P pneumonectomy 03/07/2013     Anxiety 07/08/2012     Type 1 diabetes mellitus with diabetic chronic kidney disease, unspecified CKD stage ([facility]) 10/28/2010     Pancreatic insufficiency 10/28/2010     Chronic sinusitis 10/28/2010     GERD (gastroesophageal reflux disease) 10/28/2010       LOS (days): 4  Geometric Mean LOS (GMLOS) (days): 3  Days to GMLOS:-1     OBJECTIVE:  PATIENT READMITTED TO HOSPITAL  Risk of Unplanned Readmission Score: 43.45         Current admission status: Inpatient       Preferred Pharmacy:   [id number] - [place], [place] - [address]  [address]  [place] [address]  Phone: [phone] Fax: [phone]    Primary Care Provider: Solis Avila MD    Primary Insurance: BLUE CROSS  Secondary Insurance: MEDICARE    ASSESSMENT:  Active Health Care Proxies       ADELA KAUR Health Care Agent - Spouse   Primary Phone: [phone] (Mobile)                 Advance Directives  Does patient have a Health Care POA?: Yes  Does patient have Advance Directives?: Yes  Advance Directives: Power of  for health care  Primary Contact: Marcos Olivia         Readmission Root Cause  30 Day Readmission: Yes  During your hospital stay, did someone (provider, nurse, ) explain your care to you in a way you could understand?: Yes  Did you feel medically stable to leave the hospital?: Yes  Were you able to pay for your medication at the pharmacy?: Yes  Did you have reliable transportation to take you to your appointments?: Yes  During previous admission, was a post-acute recommendation made?: No  Patient was readmitted due to: Cough  Action Plan: Hx of cystic fibrosis    Patient Information  Admitted from::  Home  Mental Status: Alert  During Assessment patient was accompanied by: Not accompanied during assessment  Assessment information provided by:: Patient  Primary Caregiver: Self  County of Residence: [place]  What city do you live in?: [place]  Home entry access options. Select all that apply.: No steps to enter home  Type of Current Residence: 2 story home  Upon entering residence, is there a bedroom on the main floor (no further steps)?: Yes  Upon entering residence, is there a bathroom on the main floor (no further steps)?: Yes  Living Arrangements: Lives w/ Spouse/significant other, Lives w/ Children  Is patient a ?: No    Activities of Daily Living Prior to Admission  Functional Status: Independent  Completes ADLs independently?: Yes  Ambulates independently?: Yes  Does patient use assisted devices?: No  Does patient currently own DME?: No  Does the patient have a history of Short-Term Rehab?: No  Does patient have a history of HHC?: No  Does patient currently have HHC?: No         Patient Information Continued  Income Source: Pension/assisted  Does patient have prescription coverage?: Yes  Does patient receive dialysis treatments?: No  Does patient have a history of substance abuse?: No  Does patient have a history of Mental Health Diagnosis?: No         Means of Transportation  Means of Transport to Appts:: Family transport      Social Determinants of Health (SDOH)      Flowsheet Row Most Recent Value   Housing Stability    In the last 12 months, was there a time when you were not able to pay the mortgage or rent on time? N   In the past 12 months, how many times have you moved where you were living? 0   At any time in the past 12 months, were you homeless or living in a shelter (including now)? N   Transportation Needs    In the past 12 months, has lack of transportation kept you from medical appointments or from getting medications? no   In the past 12 months, has lack of transportation kept you  from meetings, work, or from getting things needed for daily living? No   Food Insecurity    Within the past 12 months, you worried that your food would run out before you got the money to buy more. Never true   Within the past 12 months, the food you bought just didn't last and you didn't have money to get more. Never true   Utilities    In the past 12 months has the electric, gas, oil, or water company threatened to shut off services in your home? No            DISCHARGE DETAILS:    Discharge planning discussed with:: Patient  Freedom of Choice: Yes  Comments - Freedom of Choice: CM discussed discharge planning and freedom of choice if services are recommended by [facility].  CM contacted family/caregiver?: No- see comments (Patient declined.)  Were Treatment Team discharge recommendations reviewed with patient/caregiver?: Yes  Did patient/caregiver verbalize understanding of patient care needs?: Yes  Were patient/caregiver advised of the risks associated with not following Treatment Team discharge recommendations?: Yes         Requested Home Health Care         Is the patient interested in HHC at discharge?: No    DME Referral Provided  Referral made for DME?: No    Other Referral/Resources/Interventions Provided:  Interventions: None Indicated    Would you like to participate in our [facility] service program?  : No - Declined    Treatment Team Recommendation: Home  Discharge Destination Plan:: Home  Transport at Discharge : Family

## 2025-01-23 NOTE — ASSESSMENT & PLAN NOTE
Patient presentation due to sore throat, persistent cough, shortness of breath, wheezing, pleuritic chest pain starting since Friday 1/17.  Patient reports the symptoms are getting worse despite usual treatment with mucinex, nebulizers, OTC cough meds  CT chest appears similar to Nov '24 - no definitive infiltrate, but notes inflammatory tree-in-bud appearance  ID consulted for guidance due to transplant status and recurrence of symptoms  Procalcitonin normal x2 (was elevated Nov '24)  FLU/COVID/RSV/RP2 all negative   Continue respiratory protocol, supplemental O2 prn  Mucinex, albuterol, hycodan prn

## 2025-01-23 NOTE — ASSESSMENT & PLAN NOTE
Secondary to cystic fibrosis; Followed by [facility], s/p bilat lung transplant 2013, subsequent rejection and severe bronchiolitis obliterans   s/p second left lung transplant 2017 and right pneumonectomy  Has had recurrent admissions for pneumonia and Pseudomonas/MSSA   Patient currently taking tacrolimus 3 mg twice a day and prednisone 20 mg daily  Patient takes for prophylaxis azithromycin 250 mg 3 times a week MW, atovaquone 1500 milligrams daily for PJP prophylaxis and Valtrex 500 mg daily

## 2025-01-23 NOTE — PROGRESS NOTES
Progress Note - Hospitalist   Name: Maximilian Mann 49 y.o. female I MRN: [id number]  Unit/Bed#: -01 I Date of Admission: 1/19/2025   Date of Service: 1/23/2025 I Hospital Day: 4    Assessment & Plan  Hx of lung transplant (HCC)  Secondary to cystic fibrosis; Followed by [facility]cd[facility], s/p bilat lung transplant 2013, subsequent rejection and severe bronchiolitis obliterans   s/p second left lung transplant 2017 and right pneumonectomy  Has had recurrent admissions for pneumonia and Pseudomonas/MSSA   Patient currently taking tacrolimus 3 mg twice a day and prednisone 20 mg daily  Patient takes for prophylaxis azithromycin 250 mg 3 times a week MWF, atovaquone 1500 milligrams daily for PJP prophylaxis and Valtrex 500 mg daily  Cough  Patient presentation due to sore throat, persistent cough, shortness of breath, wheezing, pleuritic chest pain starting since Friday 1/17.  Patient reports the symptoms are getting worse despite usual treatment with mucinex, nebulizers, OTC cough meds  CT chest appears similar to Nov '24 - no definitive infiltrate, but notes inflammatory tree-in-bud appearance  ID consulted for guidance due to transplant status and recurrence of symptoms  Procalcitonin normal x2 (was elevated Nov '24)  FLU/COVID/RSV/RP2 all negative   Continue respiratory protocol, supplemental O2 prn  Mucinex, albuterol, hycodan prn  SIRS (systemic inflammatory response syndrome) ([facility])  Tachycardia, tachypnea. She remains afebrile and without leukocytosis or elevated procalcitonin. RP2 negative.  ID consulted, no clear new infectious etiology  Sputum GBS and candida, appears to be colonized   Ceftriaxone x3 days, switched to amoxicillin through 1/25  Nystatin swish/swallow per patient request   Type 1 diabetes mellitus with diabetic chronic kidney disease, unspecified CKD stage ([facility])  Blood Sugar Average: Last 72 hrs:(P) [id number]  Continue Lantus 12 units in the morning   Continue humalog 10 units  with meals  Sliding scale coverage  Hypoglycemia protocol  Diabetic diet  Pancreatic insufficiency  Continue Creon  GEOFF (acute kidney injury) (HCC)  Cr 1.3 on admission, baseline Cr 0.8-1.1  Suspected prerenal due to possible acute viral illness?   Cr improved with IV fluids, will hold on further due to patient swelling/fluid retention  Will give 1x lasix IV now due to swelling 1/23  BMP in am  GERD (gastroesophageal reflux disease)  History of severe GERD status post fundoplication in 2023   Patient currently taking dexlansoprazole 60 mg daily and famotidine 40 mg daily at bedtime  Continue formulary sub PPI + famotidine  History of DVT (deep vein thrombosis)  Continue with Eliquis 5 mg twice a day.  Pleuritic chest pain  Pain uncontrolled, now better with long-acting regimen. Continue:  Morphine ER 15 mg TID  Oxycodone 5 mg q4h prn moderate pain  Oxycodone 10 mg q4h prn severe pain  Dilaudid 1 mg q4h prn breakthrough pain  Tylenol prn  Hycodan prn  Lactic acidosis  Elevated lactic acid 2.7 and improved with IV fluids to 2.5, suspect type B lactic acid elevation secondary to albuterol  No evidence of end-organ damage (barely meets GEOFF criteria, no definitive infection)    VTE Pharmacologic Prophylaxis: VTE Score: 3 Moderate Risk (Score 3-4) - Pharmacological DVT Prophylaxis Ordered: apixaban (Eliquis).    Mobility:   Basic Mobility Inpatient Raw Score: 24  -HLM Goal: 8: Walk 250 feet or more  JH-HLM Achieved: 1: Laying in bed  JH-HLM Goal NOT achieved. Continue with multidisciplinary rounding and encourage appropriate mobility to improve upon -HLM goals.    Patient Centered Rounds: I performed bedside rounds with nursing staff today.   Discussions with Specialists or Other Care Team Provider: FABIO JARRELL     Education and Discussions with Family / Patient: Patient declined call to .     Current Length of Stay: 4 day(s)   Current Patient Status: Inpatient   Certification Statement: The patient will  continue to require additional inpatient hospital stay due to ongoing pain control and respiratory monitoring   Discharge Plan: Anticipate discharge in >72 hrs to home.    Code Status: Level 1 - Full Code    Subjective   Patient does report that pain has improved, no increasing shortness of breath, no fevers or chills.  Cough is still productive.  Urinating well and having normal bowel movements so far.  Her only other concern is edema in the arms.  Denies any swelling in the legs, facial edema has improved.    Objective :  Temp:  [98.1 °F (36.7 °C)-98.4 °F (36.9 °C)] 98.4 °F (36.9 °C)  HR:  [80] 80  BP: (132-138)/(72-81) 134/72  Resp:  [17-20] 18  SpO2:  [97 %] 97 %  O2 Device: None (Room air)    Body mass index is 24.31 kg/m².     Input and Output Summary (last 24 hours):     Intake/Output Summary (Last 24 hours) at 1/23/2025 0757  Last data filed at 1/22/2025 2007  Gross per 24 hour   Intake 940 ml   Output --   Net 940 ml       Physical Exam  Vitals and nursing note reviewed.   Constitutional:       General: She is not in acute distress.     Appearance: Normal appearance. She is not ill-appearing or toxic-appearing.   HENT:      Head: Normocephalic.   Cardiovascular:      Rate and Rhythm: Normal rate and regular rhythm.      Heart sounds: Normal heart sounds.   Pulmonary:      [place]: Pulmonary effort is normal. No accessory muscle usage or respiratory distress.      Breath sounds: Examination of the left-upper field reveals rhonchi. Examination of the left-middle field reveals rhonchi. Examination of the left-lower field reveals rhonchi. Rhonchi (lower>upper) present. No wheezing.      Comments: Absent R breath sounds  Abdominal:      General: Bowel sounds are normal. There is no distension.      Palpations: Abdomen is soft.      Tenderness: There is no abdominal tenderness.   Musculoskeletal:         General: Swelling (BUE, pitting) present.      Right lower leg: No edema.      Left lower leg: No edema.    Skin:     General: Skin is warm and dry.      Coloration: Skin is not pale.   Neurological:      Mental Status: She is alert and oriented to person, place, and time.   Psychiatric:         Mood and Affect: Mood normal.         Behavior: Behavior normal.           Lines/Drains:  Lines/Drains/Airways       Active Status       Name Placement date Placement time Site Days    Port A Cath 11/25/22 Right Chest 11/25/22  0953  Chest  789                    Central Line:  Goal for removal: Port accessed. Will de-access as appropriate.               Lab Results: I have reviewed the following results:   Results from last 7 days   Lab Units 01/20/25  0442 01/19/25  1249   WBC Thousand/uL 6.47 7.53   HEMOGLOBIN g/dL 7.8* 8.4*   HEMATOCRIT % 29.2* 30.5*   PLATELETS Thousands/uL 145* 157   SEGS PCT %  --  76*   LYMPHO PCT %  --  11*   MONO PCT %  --  11   EOS PCT %  --  1     Results from last 7 days   Lab Units 01/20/25  0442 01/19/25  1249   SODIUM mmol/L 135 140   POTASSIUM mmol/L 4.8 4.4   CHLORIDE mmol/L 98 94*   CO2 mmol/L 32 39*   BUN mg/dL 28* 38*   CREATININE mg/dL 1.05 1.33*   ANION GAP mmol/L 5 7   CALCIUM mg/dL 8.5 8.2*   ALBUMIN g/dL  --  3.5   TOTAL BILIRUBIN mg/dL  --  0.40   ALK PHOS U/L  --  70   ALT U/L  --  23   AST U/L  --  17   GLUCOSE RANDOM mg/dL 122 181*     Results from last 7 days   Lab Units 01/19/25  1249   INR  1.16     Results from last 7 days   Lab Units 01/23/25  0616 01/22/25  2124 01/22/25  1700 01/22/25  1132 01/22/25  0632 01/21/25  2210 01/21/25  1651 01/21/25  1042 01/21/25  0622 01/21/25  0221 01/21/25  0059 01/20/25  2048   POC GLUCOSE mg/dl 140 327* 390* 217* 150* 322* 344* 159* 132 294* 460* 476*         Results from last 7 days   Lab Units 01/20/25  0442 01/19/25  1555 01/19/25  1249   LACTIC ACID mmol/L  --  2.5* 2.7*   PROCALCITONIN ng/ml <0.05  --  0.06       Recent Cultures (last 7 days):   Results from last 7 days   Lab Units 01/19/25  1556 01/19/25  1254 01/19/25  1249   BLOOD  CULTURE   --  No Growth at 48 hrs. No Growth at 48 hrs.   SPUTUM CULTURE  3+ Growth of Beta Hemolytic Streptococcus Group B*  4+ Growth of Candida sp. presumptively albicans*  --   --    GRAM STAIN RESULT  2+ Epithelial cells per low power field*  2+ Polys*  2+ Gram positive cocci in pairs*  2+ Budding yeast*  --   --        Imaging Results Review: No pertinent imaging studies reviewed.  Other Study Results Review: No additional pertinent studies reviewed.    Last 24 Hours Medication List:     Current Facility-Administered Medications:     acetaminophen (TYLENOL) tablet 975 mg, Q6H PRN    ALPRAZolam (XANAX) tablet 1 mg, HS PRN    aluminum-magnesium hydroxide-simethicone (MAALOX) oral suspension 30 mL, Q6H PRN    amoxicillin (AMOXIL) capsule 500 mg, Q8H DOTTY    apixaban (ELIQUIS) tablet 5 mg, BID    ascorbic acid (VITAMIN C) tablet 250 mg, Daily    atovaquone (MEPRON) oral suspension 1,500 mg, Daily    azithromycin (ZITHROMAX) tablet 250 mg, Once per day on Monday Wednesday Friday    bisacodyl (DULCOLAX) EC tablet 5 mg, HS    budesonide-formoterol (SYMBICORT) 160-4.5 mcg/act inhaler 2 puff, Q12H    Cholecalciferol (VITAMIN D3) tablet 5,000 Units, Daily    cyanocobalamin (VITAMIN B-12) tablet 1,000 mcg, Daily    famotidine (PEPCID) tablet 40 mg, HS    furosemide (LASIX) tablet 40 mg, Daily    guaiFENesin (MUCINEX) 12 hr tablet 1,200 mg, Q12H DOTTY    HYDROcodone Bit-Homatrop MBr (HYCODAN) oral syrup 5 mL, Q4H PRN    HYDROmorphone (DILAUDID) injection 1 mg, Q4H PRN    insulin glargine (LANTUS) subcutaneous injection 12 Units 0.12 mL, QAM    insulin lispro (HumALOG/ADMELOG) 100 units/mL subcutaneous injection 1-5 Units, TID AC **AND** Fingerstick Glucose (POCT), 4x Daily AC and at bedtime    insulin lispro (HumALOG/ADMELOG) 100 units/mL subcutaneous injection 1-5 Units, HS    ipratropium-albuterol (DUO-NEB) 0.5-2.5 mg/3 mL inhalation solution 3 mL, Q6H PRN    lidocaine (LIDODERM) 5 % patch 1 patch, Daily PRN     magnesium gluconate (MAGONATE) tablet 500 mg, BID    morphine (MS CONTIN) ER tablet 15 mg, Q8H DOTTY    naloxone (NARCAN) 0.04 mg/mL syringe 0.04 mg, Q1MIN PRN    nystatin (MYCOSTATIN) oral suspension 500,000 Units, 4x Daily    ondansetron (ZOFRAN) injection 4 mg, Q6H PRN    oxybutynin (DITROPAN-XL) 24 hr tablet 15 mg, HS    oxyCODONE (ROXICODONE) IR tablet 5 mg, Q4H PRN **OR** oxyCODONE (ROXICODONE) immediate release tablet 10 mg, Q4H PRN    pancrelipase (Lip-Prot-Amyl) (CREON) delayed release capsule 48,000 Units, TID With Meals    pantoprazole (PROTONIX) EC tablet 40 mg, BID AC    polyethylene glycol (MIRALAX) packet 17 g, BID    predniSONE tablet 20 mg, Daily    pregabalin (LYRICA) capsule 150 mg, BID    tacrolimus (PROGRAF) capsule 3 mg, BID    valACYclovir (VALTREX) tablet 500 mg, Q24H    Administrative Statements   Today, Patient Was Seen By: Yolanda Ferrara PA-C  I have spent a total time of 45 minutes in caring for this patient on the day of the visit/encounter including Instructions for management, Counseling / Coordination of care, Documenting in the medical record, Reviewing / ordering tests, medicine, procedures  , and Communicating with other healthcare professionals .    **Please Note: This note may have been constructed using a voice recognition system.**

## 2025-01-23 NOTE — ASSESSMENT & PLAN NOTE
History of severe GERD status post fundoplication in 2023   Patient currently taking dexlansoprazole 60 mg daily and famotidine 40 mg daily at bedtime  Continue formulary sub PPI + famotidine

## 2025-01-23 NOTE — ASSESSMENT & PLAN NOTE
Mild tachycardia, tachypnea.  No fevers, leukocytosis.  Normal procalcitonin x 2.  Consider viral process based on patient's constellation of symptoms.  RP 2 is negative.  No new findings on chest CT, as below.  Seems to be clinically improving.  Admission blood cultures are negative.   Antibiotic plan as below   Follow temperatures and hemodynamics   Follow symptomatology   Recheck CBC in AM to assess potential infection

## 2025-01-23 NOTE — ASSESSMENT & PLAN NOTE
Patient presented with progressively worsening respiratory symptoms.  No fevers, leukocytosis, or procalcitonin elevation although we may not see such abnormalities given patient's immunosuppressed state.  Consider possibility of microaspiration in the setting of significantly dilated esophagus.  Less likely new pulmonary infection as current CT chest remains stable in comparison to prior CT from 2 months ago.  Patient has known history of chronic airway colonization including GBS, MRSA.  Sputum culture was sent but appears to be a poor sample.  Final sputum culture again with growth of GBS and Candida albicans consistent with chronic colonization.  Will plan to treat for short course given immunosuppressed state.  Symptoms seem to be improving   Continue amoxicillin with plan for 7-day course, through 1/25.   Follow respiratory symptoms and O2 requirements

## 2025-01-24 LAB
ANION GAP SERPL CALCULATED.3IONS-SCNC: 4 MMOL/L (ref 4–13)
BASOPHILS # BLD AUTO: 0.01 THOUSANDS/ΜL (ref 0–0.1)
BASOPHILS NFR BLD AUTO: 0 % (ref 0–1)
BUN SERPL-MCNC: 44 MG/DL (ref 5–25)
CALCIUM SERPL-MCNC: 8.5 MG/DL (ref 8.4–10.2)
CHLORIDE SERPL-SCNC: 96 MMOL/L (ref 96–108)
CO2 SERPL-SCNC: 36 MMOL/L (ref 21–32)
CREAT SERPL-MCNC: 1.34 MG/DL (ref 0.6–1.3)
EOSINOPHIL # BLD AUTO: 0.08 THOUSAND/ΜL (ref 0–0.61)
EOSINOPHIL NFR BLD AUTO: 1 % (ref 0–6)
ERYTHROCYTE [DISTWIDTH] IN BLOOD BY AUTOMATED COUNT: 18.8 % (ref 11.6–15.1)
GFR SERPL CREATININE-BSD FRML MDRD: 47 ML/MIN/1.73SQ M
GLUCOSE SERPL-MCNC: 186 MG/DL (ref 65–140)
GLUCOSE SERPL-MCNC: 187 MG/DL (ref 65–140)
GLUCOSE SERPL-MCNC: 188 MG/DL (ref 65–140)
GLUCOSE SERPL-MCNC: 289 MG/DL (ref 65–140)
GLUCOSE SERPL-MCNC: 294 MG/DL (ref 65–140)
HCT VFR BLD AUTO: 32.8 % (ref 34.8–46.1)
HGB BLD-MCNC: 8.7 G/DL (ref 11.5–15.4)
IMM GRANULOCYTES # BLD AUTO: 0.02 THOUSAND/UL (ref 0–0.2)
IMM GRANULOCYTES NFR BLD AUTO: 0 % (ref 0–2)
LYMPHOCYTES # BLD AUTO: 1.86 THOUSANDS/ΜL (ref 0.6–4.47)
LYMPHOCYTES NFR BLD AUTO: 30 % (ref 14–44)
MCH RBC QN AUTO: 21.9 PG (ref 26.8–34.3)
MCHC RBC AUTO-ENTMCNC: 26.5 G/DL (ref 31.4–37.4)
MCV RBC AUTO: 82 FL (ref 82–98)
MONOCYTES # BLD AUTO: 0.59 THOUSAND/ΜL (ref 0.17–1.22)
MONOCYTES NFR BLD AUTO: 10 % (ref 4–12)
NEUTROPHILS # BLD AUTO: 3.57 THOUSANDS/ΜL (ref 1.85–7.62)
NEUTS SEG NFR BLD AUTO: 59 % (ref 43–75)
NRBC BLD AUTO-RTO: 0 /100 WBCS
PLATELET # BLD AUTO: 157 THOUSANDS/UL (ref 149–390)
PMV BLD AUTO: 10.4 FL (ref 8.9–12.7)
POTASSIUM SERPL-SCNC: 4.2 MMOL/L (ref 3.5–5.3)
RBC # BLD AUTO: 3.98 MILLION/UL (ref 3.81–5.12)
SODIUM SERPL-SCNC: 136 MMOL/L (ref 135–147)
WBC # BLD AUTO: 6.13 THOUSAND/UL (ref 4.31–10.16)

## 2025-01-24 PROCEDURE — 85025 COMPLETE CBC W/AUTO DIFF WBC: CPT | Performed by: PHYSICIAN ASSISTANT

## 2025-01-24 PROCEDURE — 99232 SBSQ HOSP IP/OBS MODERATE 35: CPT | Performed by: PHYSICIAN ASSISTANT

## 2025-01-24 PROCEDURE — 82948 REAGENT STRIP/BLOOD GLUCOSE: CPT

## 2025-01-24 PROCEDURE — 80048 BASIC METABOLIC PNL TOTAL CA: CPT | Performed by: PHYSICIAN ASSISTANT

## 2025-01-24 PROCEDURE — 94640 AIRWAY INHALATION TREATMENT: CPT

## 2025-01-24 PROCEDURE — 94668 MNPJ CHEST WALL SBSQ: CPT

## 2025-01-24 PROCEDURE — 94760 N-INVAS EAR/PLS OXIMETRY 1: CPT

## 2025-01-24 PROCEDURE — 94664 DEMO&/EVAL PT USE INHALER: CPT

## 2025-01-24 RX ORDER — SODIUM CHLORIDE FOR INHALATION 3 %
4 VIAL, NEBULIZER (ML) INHALATION
Status: DISPENSED | OUTPATIENT
Start: 2025-01-24 | End: 2025-01-27

## 2025-01-24 RX ORDER — INSULIN LISPRO 100 [IU]/ML
5 INJECTION, SOLUTION INTRAVENOUS; SUBCUTANEOUS
Status: DISCONTINUED | OUTPATIENT
Start: 2025-01-25 | End: 2025-01-25

## 2025-01-24 RX ORDER — INSULIN LISPRO 100 [IU]/ML
10 INJECTION, SOLUTION INTRAVENOUS; SUBCUTANEOUS
Status: DISCONTINUED | OUTPATIENT
Start: 2025-01-24 | End: 2025-01-25

## 2025-01-24 RX ORDER — LEVALBUTEROL INHALATION SOLUTION 1.25 MG/3ML
1.25 SOLUTION RESPIRATORY (INHALATION)
Status: DISPENSED | OUTPATIENT
Start: 2025-01-24 | End: 2025-01-27

## 2025-01-24 RX ORDER — INSULIN LISPRO 100 [IU]/ML
7 INJECTION, SOLUTION INTRAVENOUS; SUBCUTANEOUS
Status: DISCONTINUED | OUTPATIENT
Start: 2025-01-24 | End: 2025-01-25

## 2025-01-24 RX ADMIN — NYSTATIN 500000 UNITS: 100000 SUSPENSION ORAL at 11:35

## 2025-01-24 RX ADMIN — SODIUM CHLORIDE SOLN NEBU 3% 4 ML: 3 NEBU SOLN at 13:31

## 2025-01-24 RX ADMIN — OXYCODONE HYDROCHLORIDE 10 MG: 10 TABLET ORAL at 17:25

## 2025-01-24 RX ADMIN — Medication 500 MG: at 17:25

## 2025-01-24 RX ADMIN — PREDNISONE 20 MG: 20 TABLET ORAL at 09:10

## 2025-01-24 RX ADMIN — VALACYCLOVIR HYDROCHLORIDE 500 MG: 500 TABLET, FILM COATED ORAL at 09:09

## 2025-01-24 RX ADMIN — MORPHINE SULFATE 15 MG: 15 TABLET, EXTENDED RELEASE ORAL at 13:23

## 2025-01-24 RX ADMIN — OXYCODONE HYDROCHLORIDE AND ACETAMINOPHEN 250 MG: 500 TABLET ORAL at 09:09

## 2025-01-24 RX ADMIN — PREGABALIN 150 MG: 75 CAPSULE ORAL at 09:09

## 2025-01-24 RX ADMIN — AZITHROMYCIN 250 MG: 250 TABLET, FILM COATED ORAL at 09:10

## 2025-01-24 RX ADMIN — POLYETHYLENE GLYCOL 3350 17 G: 17 POWDER, FOR SOLUTION ORAL at 17:25

## 2025-01-24 RX ADMIN — LEVALBUTEROL HYDROCHLORIDE 1.25 MG: 1.25 SOLUTION RESPIRATORY (INHALATION) at 20:29

## 2025-01-24 RX ADMIN — AMOXICILLIN 500 MG: 250 CAPSULE ORAL at 13:23

## 2025-01-24 RX ADMIN — PANCRELIPASE 48000 UNITS: 120000; 24000; 76000 CAPSULE, DELAYED RELEASE PELLETS ORAL at 07:42

## 2025-01-24 RX ADMIN — TACROLIMUS 3 MG: 1 CAPSULE ORAL at 17:25

## 2025-01-24 RX ADMIN — POLYETHYLENE GLYCOL 3350 17 G: 17 POWDER, FOR SOLUTION ORAL at 09:10

## 2025-01-24 RX ADMIN — FAMOTIDINE 40 MG: 20 TABLET, FILM COATED ORAL at 21:33

## 2025-01-24 RX ADMIN — BUDESONIDE AND FORMOTEROL FUMARATE DIHYDRATE 2 PUFF: 160; 4.5 AEROSOL RESPIRATORY (INHALATION) at 09:37

## 2025-01-24 RX ADMIN — NYSTATIN 500000 UNITS: 100000 SUSPENSION ORAL at 21:34

## 2025-01-24 RX ADMIN — BUDESONIDE AND FORMOTEROL FUMARATE DIHYDRATE 2 PUFF: 160; 4.5 AEROSOL RESPIRATORY (INHALATION) at 21:32

## 2025-01-24 RX ADMIN — APIXABAN 5 MG: 5 TABLET, FILM COATED ORAL at 09:09

## 2025-01-24 RX ADMIN — AMOXICILLIN 500 MG: 250 CAPSULE ORAL at 21:33

## 2025-01-24 RX ADMIN — PANTOPRAZOLE SODIUM 40 MG: 40 TABLET, DELAYED RELEASE ORAL at 17:37

## 2025-01-24 RX ADMIN — INSULIN LISPRO 3 UNITS: 100 INJECTION, SOLUTION INTRAVENOUS; SUBCUTANEOUS at 17:25

## 2025-01-24 RX ADMIN — MORPHINE SULFATE 15 MG: 15 TABLET, EXTENDED RELEASE ORAL at 21:33

## 2025-01-24 RX ADMIN — CYANOCOBALAMIN TAB 500 MCG 1000 MCG: 500 TAB at 09:10

## 2025-01-24 RX ADMIN — APIXABAN 5 MG: 5 TABLET, FILM COATED ORAL at 17:25

## 2025-01-24 RX ADMIN — ATOVAQUONE 1500 MG: 750 SUSPENSION ORAL at 09:12

## 2025-01-24 RX ADMIN — INSULIN GLARGINE 12 UNITS: 100 INJECTION, SOLUTION SUBCUTANEOUS at 09:10

## 2025-01-24 RX ADMIN — INSULIN LISPRO 10 UNITS: 100 INJECTION, SOLUTION INTRAVENOUS; SUBCUTANEOUS at 17:26

## 2025-01-24 RX ADMIN — ALPRAZOLAM 1 MG: 0.5 TABLET ORAL at 23:12

## 2025-01-24 RX ADMIN — PANCRELIPASE 48000 UNITS: 120000; 24000; 76000 CAPSULE, DELAYED RELEASE PELLETS ORAL at 11:35

## 2025-01-24 RX ADMIN — OXYCODONE HYDROCHLORIDE 10 MG: 10 TABLET ORAL at 01:35

## 2025-01-24 RX ADMIN — PANTOPRAZOLE SODIUM 40 MG: 40 TABLET, DELAYED RELEASE ORAL at 05:50

## 2025-01-24 RX ADMIN — INSULIN LISPRO 3 UNITS: 100 INJECTION, SOLUTION INTRAVENOUS; SUBCUTANEOUS at 21:32

## 2025-01-24 RX ADMIN — PREGABALIN 150 MG: 75 CAPSULE ORAL at 17:25

## 2025-01-24 RX ADMIN — OXYBUTYNIN CHLORIDE 15 MG: 5 TABLET, EXTENDED RELEASE ORAL at 21:33

## 2025-01-24 RX ADMIN — INSULIN LISPRO 5 UNITS: 100 INJECTION, SOLUTION INTRAVENOUS; SUBCUTANEOUS at 07:43

## 2025-01-24 RX ADMIN — AMOXICILLIN 500 MG: 250 CAPSULE ORAL at 05:50

## 2025-01-24 RX ADMIN — Medication 500 MG: at 09:10

## 2025-01-24 RX ADMIN — GUAIFENESIN 1200 MG: 600 TABLET ORAL at 21:33

## 2025-01-24 RX ADMIN — HYDROMORPHONE HYDROCHLORIDE 1 MG: 1 INJECTION, SOLUTION INTRAMUSCULAR; INTRAVENOUS; SUBCUTANEOUS at 03:03

## 2025-01-24 RX ADMIN — HYDROMORPHONE HYDROCHLORIDE 1 MG: 1 INJECTION, SOLUTION INTRAMUSCULAR; INTRAVENOUS; SUBCUTANEOUS at 10:48

## 2025-01-24 RX ADMIN — HYDROMORPHONE HYDROCHLORIDE 1 MG: 1 INJECTION, SOLUTION INTRAMUSCULAR; INTRAVENOUS; SUBCUTANEOUS at 19:41

## 2025-01-24 RX ADMIN — NYSTATIN 500000 UNITS: 100000 SUSPENSION ORAL at 09:12

## 2025-01-24 RX ADMIN — INSULIN LISPRO 1 UNITS: 100 INJECTION, SOLUTION INTRAVENOUS; SUBCUTANEOUS at 07:42

## 2025-01-24 RX ADMIN — SODIUM CHLORIDE SOLN NEBU 3% 4 ML: 3 NEBU SOLN at 20:29

## 2025-01-24 RX ADMIN — OXYCODONE HYDROCHLORIDE 10 MG: 10 TABLET ORAL at 07:42

## 2025-01-24 RX ADMIN — GUAIFENESIN 1200 MG: 600 TABLET ORAL at 09:09

## 2025-01-24 RX ADMIN — INSULIN LISPRO 7 UNITS: 100 INJECTION, SOLUTION INTRAVENOUS; SUBCUTANEOUS at 11:36

## 2025-01-24 RX ADMIN — TACROLIMUS 3 MG: 1 CAPSULE ORAL at 09:09

## 2025-01-24 RX ADMIN — PANCRELIPASE 48000 UNITS: 120000; 24000; 76000 CAPSULE, DELAYED RELEASE PELLETS ORAL at 17:25

## 2025-01-24 RX ADMIN — Medication 5000 UNITS: at 09:09

## 2025-01-24 RX ADMIN — INSULIN LISPRO 1 UNITS: 100 INJECTION, SOLUTION INTRAVENOUS; SUBCUTANEOUS at 11:35

## 2025-01-24 RX ADMIN — BISACODYL 5 MG: 5 TABLET, COATED ORAL at 21:33

## 2025-01-24 RX ADMIN — MORPHINE SULFATE 15 MG: 15 TABLET, EXTENDED RELEASE ORAL at 05:50

## 2025-01-24 RX ADMIN — LEVALBUTEROL HYDROCHLORIDE 1.25 MG: 1.25 SOLUTION RESPIRATORY (INHALATION) at 13:30

## 2025-01-24 RX ADMIN — NYSTATIN 500000 UNITS: 100000 SUSPENSION ORAL at 17:25

## 2025-01-24 NOTE — PLAN OF CARE
Problem: PAIN - ADULT  Goal: Verbalizes/displays adequate comfort level or baseline comfort level  Description: Interventions:  - Encourage patient to monitor pain and request assistance  - Assess pain using appropriate pain scale  - Administer analgesics based on type and severity of pain and evaluate response  - Implement non-pharmacological measures as appropriate and evaluate response  - Consider cultural and social influences on pain and pain management  - Notify physician/advanced practitioner if interventions unsuccessful or patient reports new pain  Outcome: Progressing     Problem: INFECTION - ADULT  Goal: Absence or prevention of progression during hospitalization  Description: INTERVENTIONS:  - Assess and monitor for signs and symptoms of infection  - Monitor lab/diagnostic results  - Monitor all insertion sites, i.e. indwelling lines, tubes, and drains  - Monitor endotracheal if appropriate and nasal secretions for changes in amount and color  - [place] appropriate cooling/warming therapies per order  - Administer medications as ordered  - Instruct and encourage patient and family to use good hand hygiene technique  - Identify and instruct in appropriate isolation precautions for identified infection/condition  Outcome: Progressing  Goal: Absence of fever/infection during neutropenic period  Description: INTERVENTIONS:  - Monitor WBC    Outcome: Progressing     Problem: SAFETY ADULT  Goal: Patient will remain free of falls  Description: INTERVENTIONS:  - Educate patient/family on patient safety including physical limitations  - Instruct patient to call for assistance with activity   - Consult OT/PT to assist with strengthening/mobility   - Keep Call bell within reach  - Keep bed low and locked with side rails adjusted as appropriate  - Keep care items and personal belongings within reach  - Initiate and maintain comfort rounds  - Make Fall Risk Sign visible to staff  - Offer Toileting every 3 Hours,  in advance of need  - Initiate/Maintain bed alarm  - Obtain necessary fall risk management equipment:   - Apply yellow socks and bracelet for high fall risk patients  - Consider moving patient to room near nurses station  Outcome: Progressing  Goal: Maintain or return to baseline ADL function  Description: INTERVENTIONS:  -  Assess patient's ability to carry out ADLs; assess patient's baseline for ADL function and identify physical deficits which impact ability to perform ADLs (bathing, care of mouth/teeth, toileting, grooming, dressing, etc.)  - Assess/evaluate cause of self-care deficits   - Assess range of motion  - Assess patient's mobility; develop plan if impaired  - Assess patient's need for assistive devices and provide as appropriate  - Encourage maximum independence but intervene and supervise when necessary  - Involve family in performance of ADLs  - Assess for home care needs following discharge   - Consider OT consult to assist with ADL evaluation and planning for discharge  - Provide patient education as appropriate  Outcome: Progressing  Goal: Maintains/Returns to pre admission functional level  Description: INTERVENTIONS:  - Perform AM-PAC 6 Click Basic Mobility/ Daily Activity assessment daily.  - Set and communicate daily mobility goal to care team and patient/family/caregiver.   - Collaborate with rehabilitation services on mobility goals if consulted  - Perform Range of Motion 3 times a day.  - Reposition patient every 3 hours.  - Dangle patient 3 times a day  - Stand patient 3 times a day  - Ambulate patient 3 times a day  - Out of bed to chair 3 times a day   - Out of bed for meals 3 times a day  - Out of bed for toileting  - Record patient progress and toleration of activity level   Outcome: Progressing     Problem: DISCHARGE PLANNING  Goal: Discharge to home or other facility with appropriate resources  Description: INTERVENTIONS:  - Identify barriers to discharge w/patient and caregiver  -  Arrange for needed discharge resources and transportation as appropriate  - Identify discharge learning needs (meds, wound care, etc.)  - Arrange for interpretive services to assist at discharge as needed  - Refer to Case Management Department for coordinating discharge planning if the patient needs post-hospital services based on physician/advanced practitioner order or complex needs related to functional status, cognitive ability, or social support system  Outcome: Progressing     Problem: Knowledge Deficit  Goal: Patient/family/caregiver demonstrates understanding of disease process, treatment plan, medications, and discharge instructions  Description: Complete learning assessment and assess knowledge base.  Interventions:  - Provide teaching at level of understanding  - Provide teaching via preferred learning methods  Outcome: Progressing     Problem: RESPIRATORY - ADULT  Goal: Achieves optimal ventilation and oxygenation  Description: INTERVENTIONS:  - Assess for changes in respiratory status  - Assess for changes in mentation and behavior  - Position to facilitate oxygenation and minimize respiratory effort  - Oxygen administered by appropriate delivery if ordered  - Initiate smoking cessation education as indicated  - Encourage broncho-pulmonary hygiene including cough, deep breathe, Incentive Spirometry  - Assess the need for suctioning and aspirate as needed  - Assess and instruct to report SOB or any respiratory difficulty  - Respiratory Therapy support as indicated  Outcome: Progressing

## 2025-01-24 NOTE — PROGRESS NOTES
Progress Note - Hospitalist   Name: Maximilian Mann 49 y.o. female I MRN: [id number]  Unit/Bed#: -01 I Date of Admission: 1/19/2025   Date of Service: 1/24/2025 I Hospital Day: 5    Assessment & Plan  Hx of lung transplant (HCC)  Secondary to cystic fibrosis; Followed by [facility], s/p bilat lung transplant 2013, subsequent rejection and severe bronchiolitis obliterans   s/p second left lung transplant 2017 and right pneumonectomy  Has had recurrent admissions for pneumonia and Pseudomonas/MSSA   Patient currently taking tacrolimus 3 mg twice a day and prednisone 20 mg daily  Patient takes for prophylaxis azithromycin 250 mg 3 times a week MWF, atovaquone 1500 milligrams daily for PJP prophylaxis and Valtrex 500 mg daily  Cough  Patient presentation due to sore throat, persistent cough, shortness of breath, wheezing, pleuritic chest pain starting since Friday 1/17.  Patient reports the symptoms are getting worse despite usual treatment with mucinex, nebulizers, OTC cough meds  CT chest appears similar to Nov '24 - no definitive infiltrate, but notes inflammatory tree-in-bud appearance  ID consulted for guidance due to transplant status and recurrence of symptoms  Procalcitonin normal x2 (was elevated Nov '24)  FLU/COVID/RSV/RP2 all negative   Continue respiratory protocol, supplemental O2 prn  Mucinex, albuterol, hycodan prn  1/24:   Trial hypertonic saline + albuterol nebs  Patient will consider higher steroid taper, had wanted to avoid earlier due to hyperglycemia, but given the arrest in her improvement, would be open to it if the saline nebs do not help    SIRS (systemic inflammatory response syndrome) (HCC)  Tachycardia, tachypnea. She remains afebrile and without leukocytosis or elevated procalcitonin. RP2 negative.  ID consulted, no clear new infectious etiology  Sputum GBS and candida, appears to be colonized   Ceftriaxone x3 days, switched to amoxicillin through 1/25  Nystatin swish/swallow per  patient request   Type 1 diabetes mellitus with diabetic chronic kidney disease, unspecified CKD stage ([facility])  Blood Sugar Average: Last 72 hrs:(P) 282.6  Continue Lantus 12 units in the morning   Continue humalog 10 units with meals  Sliding scale coverage  Hypoglycemia protocol  Diabetic diet  Pancreatic insufficiency  Continue Creon  GEOFF (acute kidney injury) ([facility])  Cr 1.3 on admission, baseline Cr 0.8-1.1  Suspected prerenal due to possible acute viral illness?   Cr improved with IV fluids, will hold on further due to patient swelling/fluid retention  Will give 1x lasix IV now due to swelling 1/23  BMP in am mildly bumped, hold on further IV lasix  GERD (gastroesophageal reflux disease)  History of severe GERD status post fundoplication in 2023   Patient currently taking dexlansoprazole 60 mg daily and famotidine 40 mg daily at bedtime  Continue formulary sub PPI + famotidine  History of DVT (deep vein thrombosis)  Continue with Eliquis 5 mg twice a day.  Pleuritic chest pain  Pain uncontrolled, now better with long-acting regimen. Continue:  Morphine ER 15 mg TID  Oxycodone 5 mg q4h prn moderate pain  Oxycodone 10 mg q4h prn severe pain  Dilaudid 1 mg q4h prn breakthrough pain  Tylenol prn  Hycodan prn  Lactic acidosis  Elevated lactic acid 2.7 and improved with IV fluids to 2.5, suspect type B lactic acid elevation secondary to albuterol  No evidence of end-organ damage (barely meets GEOFF criteria, no definitive infection)    VTE Pharmacologic Prophylaxis: VTE Score: 3 Moderate Risk (Score 3-4) - Pharmacological DVT Prophylaxis Ordered: apixaban (Eliquis).    Mobility:   Basic Mobility Inpatient Raw Score: 24  JH-HLM Goal: 8: Walk 250 feet or more  JH-HLM Achieved: 8: Walk 250 feet ot more  JH-HLM Goal achieved. Continue to encourage appropriate mobility.    Patient Centered Rounds: I performed bedside rounds with nursing staff today.   Discussions with Specialists or Other Care Team Provider: WESLY  RT    Education and Discussions with Family / Patient: Patient declined call to .     Current Length of Stay: 5 day(s)  Current Patient Status: Inpatient   Certification Statement: The patient will continue to require additional inpatient hospital stay due to ongoing respiratory treatment and monitoring   Discharge Plan: Anticipate discharge in 48-72 hrs to home.    Code Status: Level 1 - Full Code    Subjective   Patient doing OK. Her pain is definitely better controlled, but she is frustrated that not as much phlegm is coming up as she feels is present. Discussed hypertonic saline, which she uses at home, and will try. She is also willing to consider steroids at higher dose with taper, which we were avoiding initially out of concern for hyperglycemia. Other concern is third-spacing in the arms with weeping today. We discussed the increased BUN/Cr with extra lasix and will trial compression at this time.     Objective :  Temp:  [97.8 °F (36.6 °C)-98.2 °F (36.8 °C)] 97.8 °F (36.6 °C)  BP: (121-145)/(69-82) 124/77  Resp:  [16] 16  O2 Device: None (Room air)    Body mass index is 24.31 kg/m².     Input and Output Summary (last 24 hours):   No intake or output data in the 24 hours ending 01/24/25 0808    Physical Exam  Vitals and nursing note reviewed.   Constitutional:       General: She is awake. She is not in acute distress.     Appearance: Normal appearance. She is ill-appearing. She is not toxic-appearing.   HENT:      Head: Normocephalic and atraumatic.   Cardiovascular:      Rate and Rhythm: Normal rate and regular rhythm.      Heart sounds: Normal heart sounds.   Pulmonary:      Effort: Pulmonary effort is normal. No respiratory distress.      Breath sounds: Examination of the left-upper field reveals rhonchi. Examination of the left-middle field reveals rhonchi. Examination of the left-lower field reveals rhonchi. Rhonchi present. No wheezing.      Comments: Absent right breath sounds  Abdominal:       General: Bowel sounds are normal. There is no distension.      Palpations: Abdomen is soft.      Tenderness: There is no abdominal tenderness.   Musculoskeletal:         General: Swelling (BUE, weeping of the left) present.   Skin:     General: Skin is warm and dry.      Coloration: Skin is not pale.   Neurological:      General: No focal deficit present.      Mental Status: She is alert and oriented to person, place, and time. Mental status is at baseline.   Psychiatric:         Mood and Affect: Mood normal.         Behavior: Behavior normal. Behavior is cooperative.         Thought Content: Thought content normal.         Judgment: Judgment normal.           Lines/Drains:  Lines/Drains/Airways       Active Status       Name Placement date Placement time Site Days    Port A Cath 11/25/22 Right Chest 11/25/22  0953  Chest  790                    Central Line:  Goal for removal: Port accessed. Will de-access as appropriate.               Lab Results: I have reviewed the following results:   Results from last 7 days   Lab Units 01/24/25  0552   WBC Thousand/uL 6.13   HEMOGLOBIN g/dL 8.7*   HEMATOCRIT % 32.8*   PLATELETS Thousands/uL 157   SEGS PCT % 59   LYMPHO PCT % 30   MONO PCT % 10   EOS PCT % 1     Results from last 7 days   Lab Units 01/24/25  0552 01/20/25  0442 01/19/25  1249   SODIUM mmol/L 136   < > 140   POTASSIUM mmol/L 4.2   < > 4.4   CHLORIDE mmol/L 96   < > 94*   CO2 mmol/L 36*   < > 39*   BUN mg/dL 44*   < > 38*   CREATININE mg/dL 1.34*   < > 1.33*   ANION GAP mmol/L 4   < > 7   CALCIUM mg/dL 8.5   < > 8.2*   ALBUMIN g/dL  --   --  3.5   TOTAL BILIRUBIN mg/dL  --   --  0.40   ALK PHOS U/L  --   --  70   ALT U/L  --   --  23   AST U/L  --   --  17   GLUCOSE RANDOM mg/dL 187*   < > 181*    < > = values in this interval not displayed.     Results from last 7 days   Lab Units 01/19/25  1249   INR  1.16     Results from last 7 days   Lab Units 01/24/25  0626 01/23/25  2118 01/23/25  1631 01/23/25  1051  01/23/25  0616 01/22/25  2124 01/22/25  1700 01/22/25  1132 01/22/25  0632 01/21/25  2210 01/21/25  1651 01/21/25  1042   POC GLUCOSE mg/dl 188* 300* 489* 327* 140 327* 390* 217* 150* 322* 344* 159*         Results from last 7 days   Lab Units 01/20/25  0442 01/19/25  1555 01/19/25  1249   LACTIC ACID mmol/L  --  2.5* 2.7*   PROCALCITONIN ng/ml <0.05  --  0.06       Recent Cultures (last 7 days):   Results from last 7 days   Lab Units 01/19/25  1556 01/19/25  1254 01/19/25  1249   BLOOD CULTURE   --  No Growth at 72 hrs. No Growth at 72 hrs.   SPUTUM CULTURE  3+ Growth of Beta Hemolytic Streptococcus Group B*  4+ Growth of Candida sp. presumptively albicans*  --   --    GRAM STAIN RESULT  2+ Epithelial cells per low power field*  2+ Polys*  2+ Gram positive cocci in pairs*  2+ Budding yeast*  --   --        Imaging Results Review: No pertinent imaging studies reviewed.  Other Study Results Review: No additional pertinent studies reviewed.    Last 24 Hours Medication List:     Current Facility-Administered Medications:     acetaminophen (TYLENOL) tablet 975 mg, Q6H PRN    ALPRAZolam (XANAX) tablet 1 mg, HS PRN    aluminum-magnesium hydroxide-simethicone (MAALOX) oral suspension 30 mL, Q6H PRN    amoxicillin (AMOXIL) capsule 500 mg, Q8H DOTTY    apixaban (ELIQUIS) tablet 5 mg, BID    ascorbic acid (VITAMIN C) tablet 250 mg, Daily    atovaquone (MEPRON) oral suspension 1,500 mg, Daily    azithromycin (ZITHROMAX) tablet 250 mg, Once per day on Monday Wednesday Friday    bisacodyl (DULCOLAX) EC tablet 5 mg, HS    budesonide-formoterol (SYMBICORT) 160-4.5 mcg/act inhaler 2 puff, Q12H    Cholecalciferol (VITAMIN D3) tablet 5,000 Units, Daily    cyanocobalamin (VITAMIN B-12) tablet 1,000 mcg, Daily    famotidine (PEPCID) tablet 40 mg, HS    [Held by provider] furosemide (LASIX) tablet 40 mg, Daily    guaiFENesin (MUCINEX) 12 hr tablet 1,200 mg, Q12H DOTTY    HYDROcodone Bit-Homatrop MBr (HYCODAN) oral syrup 5 mL, Q4H PRN     HYDROmorphone (DILAUDID) injection 1 mg, Q4H PRN    insulin glargine (LANTUS) subcutaneous injection 12 Units 0.12 mL, QAM    insulin lispro (HumALOG/ADMELOG) 100 units/mL subcutaneous injection 1-5 Units, TID AC **AND** Fingerstick Glucose (POCT), 4x Daily AC and at bedtime    insulin lispro (HumALOG/ADMELOG) 100 units/mL subcutaneous injection 1-5 Units, HS    insulin lispro (HumALOG/ADMELOG) 100 units/mL subcutaneous injection 5 Units, TID With Meals    ipratropium-albuterol (DUO-NEB) 0.5-2.5 mg/3 mL inhalation solution 3 mL, Q6H PRN    lidocaine (LIDODERM) 5 % patch 1 patch, Daily PRN    magnesium gluconate (MAGONATE) tablet 500 mg, BID    morphine (MS CONTIN) ER tablet 15 mg, Q8H DOTTY    naloxone (NARCAN) 0.04 mg/mL syringe 0.04 mg, Q1MIN PRN    nystatin (MYCOSTATIN) oral suspension 500,000 Units, 4x Daily    ondansetron (ZOFRAN) injection 4 mg, Q6H PRN    oxybutynin (DITROPAN-XL) 24 hr tablet 15 mg, HS    oxyCODONE (ROXICODONE) IR tablet 5 mg, Q4H PRN **OR** oxyCODONE (ROXICODONE) immediate release tablet 10 mg, Q4H PRN    pancrelipase (Lip-Prot-Amyl) (CREON) delayed release capsule 48,000 Units, TID With Meals    pantoprazole (PROTONIX) EC tablet 40 mg, BID AC    polyethylene glycol (MIRALAX) packet 17 g, BID    predniSONE tablet 20 mg, Daily    pregabalin (LYRICA) capsule 150 mg, BID    tacrolimus (PROGRAF) capsule 3 mg, BID    valACYclovir (VALTREX) tablet 500 mg, Q24H    Administrative Statements   Today, Patient Was Seen By: Yolanda Ferrara PA-C  I have spent a total time of 45 minutes in caring for this patient on the day of the visit/encounter including Risks and benefits of tx options, Counseling / Coordination of care, Documenting in the medical record, Reviewing / ordering tests, medicine, procedures  , and Communicating with other healthcare professionals .    **Please Note: This note may have been constructed using a voice recognition system.**

## 2025-01-24 NOTE — UTILIZATION REVIEW
Continued Stay Review    Date: 1/24/2025                          Current Patient Class: Inpatient  Current Level of Care:  MS     HPI:49 y.o. female initially admitted on  1/19  for treatment of  PNU w/acute resp insufficiency,  GEOFF  (baseline Cr 0.8-1.1 )  in setting of previous lung transplant    1/23    Overall had a better night. Pain is better controlled. No fevers. Ambulating around the room. Cont amoxicillin thru 1/25.  Crt improved w/IVF (currently on hold d/t edeme/fluid retnetion)    GIVE IV LASIX 40 mg  (in addition to daily po 40 mg)   IV rocephin dc'ed.   On po amoxicillin and zithromax MWF.   PRN IV Dilaudid x2 and po oxy x3.       1/24   Facial edema improved.  Pt reports only edema is in arms .  IV dilaudid @  0300 and po rosamaria x2 thru noc.  BUN 44  Crt 1.34   No SOB w/amb in room.    Breath sounds:   rhonchi through out Left lobes:  absent Rt breath sounds.  BUE pitting edema/  no LE edema noted.        Medications:   Scheduled Medications:  amoxicillin, 500 mg, Oral, Q8H DOTTY  apixaban, 5 mg, Oral, BID  vitamin C, 250 mg, Oral, Daily  atovaquone, 1,500 mg, Oral, Daily  azithromycin, 250 mg, Oral, Once per day on Monday Wednesday Friday  bisacodyl, 5 mg, Oral, HS  budesonide-formoterol, 2 puff, Inhalation, Q12H  Cholecalciferol, 5,000 Units, Oral, Daily  vitamin B-12, 1,000 mcg, Oral, Daily  famotidine, 40 mg, Oral, HS  [Held by provider] furosemide, 40 mg, Oral, Daily  guaiFENesin, 1,200 mg, Oral, Q12H DOTTY  insulin glargine, 12 Units, Subcutaneous, QAM  insulin lispro, 1-5 Units, Subcutaneous, TID AC  insulin lispro, 1-5 Units, Subcutaneous, HS  insulin lispro, 5 Units, Subcutaneous, TID With Meals  magnesium gluconate, 500 mg, Oral, BID  morphine, 15 mg, Oral, Q8H DOTTY  nystatin, 500,000 Units, Swish & Swallow, 4x Daily  oxybutynin, 15 mg, Oral, HS  pancrelipase (Lip-Prot-Amyl), 48,000 Units, Oral, TID With Meals  pantoprazole, 40 mg, Oral, BID AC  polyethylene glycol, 17 g, Oral, BID  predniSONE,  20 mg, Oral, Daily  pregabalin, 150 mg, Oral, BID  tacrolimus, 3 mg, Oral, BID  valACYclovir, 500 mg, Oral, Q24H      Continuous IV Infusions:     PRN Meds:  acetaminophen, 975 mg, Oral, Q6H PRN  ALPRAZolam, 1 mg, Oral, HS PRN  aluminum-magnesium hydroxide-simethicone, 30 mL, Oral, Q6H PRN  HYDROcodone Bit-Homatrop MBr, 5 mL, Oral, Q4H PRN  HYDROmorphone, 1 mg, Intravenous, Q4H PRN  ipratropium-albuterol, 3 mL, Nebulization, Q6H PRN  lidocaine, 1 patch, Topical, Daily PRN  naloxone, 0.04 mg, Intravenous, Q1MIN PRN  ondansetron, 4 mg, Intravenous, Q6H PRN  oxyCODONE, 5 mg, Oral, Q4H PRN   Or  oxyCODONE, 10 mg, Oral, Q4H PRN      Discharge Plan: tbd    Vital Signs (last 3 days)       Date/Time Temp Pulse Resp BP MAP (mmHg) SpO2 O2 Device Patient Position - Orthostatic VS Pain    01/24/25 0742 -- -- -- -- -- -- -- -- 7    01/24/25 0550 -- -- -- -- -- -- -- -- 7    01/24/25 0303 -- -- -- -- -- -- -- -- 8    01/24/25 0135 -- -- -- -- -- -- -- -- 9    01/23/25 23:11:13 -- -- -- 124/77 93 -- -- -- --    01/23/25 2145 -- -- -- -- -- -- -- -- 7    01/23/25 21:18:51 97.8 °F (36.6 °C) -- -- 145/82 103 -- -- -- --    01/23/25 1942 -- -- -- -- -- -- None (Room air) -- 9       Pertinent Labs/Diagnostic Results:   Radiology:  CTA chest pe study   Final Interpretation by Jp May MD (01/19 7975)      No pulmonary embolism.      Left lung transplant demonstrating compensatory hyperinflation and diffuse tree-in-bud nodularity consistent with infectious/inflammatory etiology similar to prior study.      Status post right pneumonectomy with shifting of the mediastinum to the right and dilated esophagus likely related to resultant mass effect at the GE junction.            Workstation performed: [id number]         XR chest 1 view portable   Final Interpretation by Miguel Ángel Toth MD (01/19 3812)      No acute cardiopulmonary disease status post right pneumonectomy.            Workstation performed: [id number]            Cardiology:  ECG 12 lead   Final Result by Singh Romero MD (01/20 0837)   Normal sinus rhythm   Normal ECG   Confirmed by Singh Romero ([id number]) on 1/20/2025 8:37:12 AM        GI:  No orders to display       Results from last 7 days   Lab Units 01/19/25  1555 01/19/25  1249   SARS-COV-2  Not Detected Negative     Results from last 7 days   Lab Units 01/24/25  0552 01/20/25  0442 01/19/25  1249   WBC Thousand/uL 6.13 6.47 7.53   HEMOGLOBIN g/dL 8.7* 7.8* 8.4*   HEMATOCRIT % 32.8* 29.2* 30.5*   PLATELETS Thousands/uL 157 145* 157   TOTAL NEUT ABS Thousands/µL 3.57  --  5.67         Results from last 7 days   Lab Units 01/24/25  0552 01/20/25  0442 01/19/25  1249   SODIUM mmol/L 136 135 140   POTASSIUM mmol/L 4.2 4.8 4.4   CHLORIDE mmol/L 96 98 94*   CO2 mmol/L 36* 32 39*   ANION GAP mmol/L 4 5 7   BUN mg/dL 44* 28* 38*   CREATININE mg/dL 1.34* 1.05 1.33*   EGFR ml/min/1.73sq m 47 63 47   CALCIUM mg/dL 8.5 8.5 8.2*     Results from last 7 days   Lab Units 01/19/25  1249   AST U/L 17   ALT U/L 23   ALK PHOS U/L 70   TOTAL PROTEIN g/dL 6.4   ALBUMIN g/dL 3.5   TOTAL BILIRUBIN mg/dL 0.40     Results from last 7 days   Lab Units 01/24/25  0626 01/23/25  2118 01/23/25  1631 01/23/25  1051 01/23/25  0616 01/22/25  2124 01/22/25  1700 01/22/25  1132 01/22/25  0632 01/21/25  2210 01/21/25  1651 01/21/25  1042   POC GLUCOSE mg/dl 188* 300* 489* 327* 140 327* 390* 217* 150* 322* 344* 159*     Results from last 7 days   Lab Units 01/24/25  0552 01/20/25  0442 01/19/25  1249   GLUCOSE RANDOM mg/dL 187* 122 181*       Results from last 7 days   Lab Units 01/19/25  1556 01/19/25  1254 01/19/25  1249   BLOOD CULTURE   --  No Growth After 4 Days. No Growth After 4 Days.   SPUTUM CULTURE  3+ Growth of Beta Hemolytic Streptococcus Group B*  4+ Growth of Candida sp. presumptively albicans*  --   --    GRAM STAIN RESULT  2+ Epithelial cells per low power field*  2+ Polys*  2+ Gram positive cocci in pairs*  2+ Budding yeast*   --   --        Network Utilization Review Department  ATTENTION: Please call with any questions or concerns to [phone] and carefully listen to the prompts so that you are directed to the right person. All voicemails are confidential.   For Discharge needs, contact Care Management DC Support Team at [phone] opt. 2  Send all requests for admission clinical reviews, approved or denied determinations and any other requests to dedicated fax number below belonging to the campus where the patient is receiving treatment. List of dedicated fax numbers for the Facilities:  FACILITY NAME UR FAX NUMBER   ADMISSION DENIALS (Administrative/Medical Necessity) [phone]   DISCHARGE SUPPORT TEAM (NETWORK) [phone]   PARENT CHILD HEALTH (Maternity/NICU/Pediatrics) [phone]   [facility] [phone]   [facility] [phone]   [facility] [phone]   General acute hospital [phone]   [facility] [phone]   [facility] [phone]   [facility] [phone]   [facility] [phone]   [facility] [phone]   [facility] [phone]   [facility] [phone]   [facility] [phone]

## 2025-01-24 NOTE — PLAN OF CARE
Problem: PAIN - ADULT  Goal: Verbalizes/displays adequate comfort level or baseline comfort level  Description: Interventions:  - Encourage patient to monitor pain and request assistance  - Assess pain using appropriate pain scale  - Administer analgesics based on type and severity of pain and evaluate response  - Implement non-pharmacological measures as appropriate and evaluate response  - Consider cultural and social influences on pain and pain management  - Notify physician/advanced practitioner if interventions unsuccessful or patient reports new pain  Outcome: Progressing     Problem: INFECTION - ADULT  Goal: Absence or prevention of progression during hospitalization  Description: INTERVENTIONS:  - Assess and monitor for signs and symptoms of infection  - Monitor lab/diagnostic results  - Monitor all insertion sites, i.e. indwelling lines, tubes, and drains  - Monitor endotracheal if appropriate and nasal secretions for changes in amount and color  - [place] appropriate cooling/warming therapies per order  - Administer medications as ordered  - Instruct and encourage patient and family to use good hand hygiene technique  - Identify and instruct in appropriate isolation precautions for identified infection/condition  Outcome: Progressing  Goal: Absence of fever/infection during neutropenic period  Description: INTERVENTIONS:  - Monitor WBC    Outcome: Progressing     Problem: SAFETY ADULT  Goal: Patient will remain free of falls  Description: INTERVENTIONS:  - Educate patient/family on patient safety including physical limitations  - Instruct patient to call for assistance with activity   - Consult OT/PT to assist with strengthening/mobility   - Keep Call bell within reach  - Keep bed low and locked with side rails adjusted as appropriate  - Keep care items and personal belongings within reach  - Initiate and maintain comfort rounds  - Make Fall Risk Sign visible to staff  - Offer Toileting every 2 Hours,  in advance of need  - Initiate/Maintain alarm  - Obtain necessary fall risk management equipment  - Apply yellow socks and bracelet for high fall risk patients  - Consider moving patient to room near nurses station  Outcome: Progressing  Goal: Maintain or return to baseline ADL function  Description: INTERVENTIONS:  -  Assess patient's ability to carry out ADLs; assess patient's baseline for ADL function and identify physical deficits which impact ability to perform ADLs (bathing, care of mouth/teeth, toileting, grooming, dressing, etc.)  - Assess/evaluate cause of self-care deficits   - Assess range of motion  - Assess patient's mobility; develop plan if impaired  - Assess patient's need for assistive devices and provide as appropriate  - Encourage maximum independence but intervene and supervise when necessary  - Involve family in performance of ADLs  - Assess for home care needs following discharge   - Consider OT consult to assist with ADL evaluation and planning for discharge  - Provide patient education as appropriate  Outcome: Progressing  Goal: Maintains/Returns to pre admission functional level  Description: INTERVENTIONS:  - Perform AM-PAC 6 Click Basic Mobility/ Daily Activity assessment daily.  - Set and communicate daily mobility goal to care team and patient/family/caregiver.   - Collaborate with rehabilitation services on mobility goals if consulted  - Perform Range of Motion 3 times a day.  - Reposition patient every 3 hours.  - Dangle patient 3 times a day  - Stand patient 3 times a day  - Ambulate patient 3 times a day  - Out of bed to chair 3 times a day   - Out of bed for meals 3 times a day  - Out of bed for toileting  - Record patient progress and toleration of activity level   Outcome: Progressing     Problem: DISCHARGE PLANNING  Goal: Discharge to home or other facility with appropriate resources  Description: INTERVENTIONS:  - Identify barriers to discharge w/patient and caregiver  - Arrange  for needed discharge resources and transportation as appropriate  - Identify discharge learning needs (meds, wound care, etc.)  - Arrange for interpretive services to assist at discharge as needed  - Refer to Case Management Department for coordinating discharge planning if the patient needs post-hospital services based on physician/advanced practitioner order or complex needs related to functional status, cognitive ability, or social support system  Outcome: Progressing     Problem: Knowledge Deficit  Goal: Patient/family/caregiver demonstrates understanding of disease process, treatment plan, medications, and discharge instructions  Description: Complete learning assessment and assess knowledge base.  Interventions:  - Provide teaching at level of understanding  - Provide teaching via preferred learning methods  Outcome: Progressing     Problem: RESPIRATORY - ADULT  Goal: Achieves optimal ventilation and oxygenation  Description: INTERVENTIONS:  - Assess for changes in respiratory status  - Assess for changes in mentation and behavior  - Position to facilitate oxygenation and minimize respiratory effort  - Oxygen administered by appropriate delivery if ordered  - Initiate smoking cessation education as indicated  - Encourage broncho-pulmonary hygiene including cough, deep breathe, Incentive Spirometry  - Assess the need for suctioning and aspirate as needed  - Assess and instruct to report SOB or any respiratory difficulty  - Respiratory Therapy support as indicated  Outcome: Progressing

## 2025-01-24 NOTE — ASSESSMENT & PLAN NOTE
Patient presentation due to sore throat, persistent cough, shortness of breath, wheezing, pleuritic chest pain starting since Friday 1/17.  Patient reports the symptoms are getting worse despite usual treatment with mucinex, nebulizers, OTC cough meds  CT chest appears similar to Nov '24 - no definitive infiltrate, but notes inflammatory tree-in-bud appearance  ID consulted for guidance due to transplant status and recurrence of symptoms  Procalcitonin normal x2 (was elevated Nov '24)  FLU/COVID/RSV/RP2 all negative   Continue respiratory protocol, supplemental O2 prn  Mucinex, albuterol, hycodan prn  1/24:   Trial hypertonic saline + albuterol nebs  Patient will consider higher steroid taper, had wanted to avoid earlier due to hyperglycemia, but given the arrest in her improvement, would be open to it if the saline nebs do not help

## 2025-01-24 NOTE — ASSESSMENT & PLAN NOTE
Cr 1.3 on admission, baseline Cr 0.8-1.1  Suspected prerenal due to possible acute viral illness?   Cr improved with IV fluids, will hold on further due to patient swelling/fluid retention  Will give 1x lasix IV now due to swelling 1/23  BMP in am mildly bumped, hold on further IV lasix

## 2025-01-24 NOTE — TREATMENT PLAN
Updated WAYNE Serrano with [facility] transplant team regarding current status. Agrees with hypertonic saline nebs. I did also discuss increased steroids and they would agree with that as well, will monitor for response to the hypertonic saline and if no improvement will  stop prednisone 20 mg daily, give 1x dose 60 mg IV solumedrol then decrease to 40 mg IV solumedrol daily with plan to taper.

## 2025-01-24 NOTE — ASSESSMENT & PLAN NOTE
Blood Sugar Average: Last 72 hrs:(P) 282.6  Continue Lantus 12 units in the morning   Continue humalog 10 units with meals  Sliding scale coverage  Hypoglycemia protocol  Diabetic diet

## 2025-01-25 LAB
ANION GAP SERPL CALCULATED.3IONS-SCNC: 5 MMOL/L (ref 4–13)
BACTERIA BLD CULT: NORMAL
BACTERIA BLD CULT: NORMAL
BUN SERPL-MCNC: 40 MG/DL (ref 5–25)
CALCIUM SERPL-MCNC: 8.2 MG/DL (ref 8.4–10.2)
CHLORIDE SERPL-SCNC: 100 MMOL/L (ref 96–108)
CO2 SERPL-SCNC: 32 MMOL/L (ref 21–32)
CREAT SERPL-MCNC: 1.13 MG/DL (ref 0.6–1.3)
GFR SERPL CREATININE-BSD FRML MDRD: 58 ML/MIN/1.73SQ M
GLUCOSE SERPL-MCNC: 187 MG/DL (ref 65–140)
GLUCOSE SERPL-MCNC: 198 MG/DL (ref 65–140)
GLUCOSE SERPL-MCNC: 204 MG/DL (ref 65–140)
GLUCOSE SERPL-MCNC: 216 MG/DL (ref 65–140)
GLUCOSE SERPL-MCNC: 260 MG/DL (ref 65–140)
POTASSIUM SERPL-SCNC: 4.5 MMOL/L (ref 3.5–5.3)
SODIUM SERPL-SCNC: 137 MMOL/L (ref 135–147)

## 2025-01-25 PROCEDURE — 99232 SBSQ HOSP IP/OBS MODERATE 35: CPT | Performed by: INTERNAL MEDICINE

## 2025-01-25 PROCEDURE — 80048 BASIC METABOLIC PNL TOTAL CA: CPT | Performed by: PHYSICIAN ASSISTANT

## 2025-01-25 PROCEDURE — 94640 AIRWAY INHALATION TREATMENT: CPT

## 2025-01-25 PROCEDURE — 94668 MNPJ CHEST WALL SBSQ: CPT

## 2025-01-25 PROCEDURE — 94664 DEMO&/EVAL PT USE INHALER: CPT

## 2025-01-25 PROCEDURE — 82948 REAGENT STRIP/BLOOD GLUCOSE: CPT

## 2025-01-25 PROCEDURE — 94760 N-INVAS EAR/PLS OXIMETRY 1: CPT

## 2025-01-25 RX ORDER — INSULIN GLARGINE 100 [IU]/ML
16 INJECTION, SOLUTION SUBCUTANEOUS EVERY MORNING
Status: DISCONTINUED | OUTPATIENT
Start: 2025-01-26 | End: 2025-01-28

## 2025-01-25 RX ORDER — HYDROMORPHONE HCL/PF 1 MG/ML
1 SYRINGE (ML) INJECTION EVERY 4 HOURS PRN
Status: DISCONTINUED | OUTPATIENT
Start: 2025-01-25 | End: 2025-01-26

## 2025-01-25 RX ORDER — INSULIN LISPRO 100 [IU]/ML
12 INJECTION, SOLUTION INTRAVENOUS; SUBCUTANEOUS
Status: DISCONTINUED | OUTPATIENT
Start: 2025-01-25 | End: 2025-01-28

## 2025-01-25 RX ORDER — BENZOCAINE/MENTHOL 6 MG-10 MG
LOZENGE MUCOUS MEMBRANE 4 TIMES DAILY PRN
Status: DISCONTINUED | OUTPATIENT
Start: 2025-01-25 | End: 2025-01-28 | Stop reason: HOSPADM

## 2025-01-25 RX ORDER — INSULIN LISPRO 100 [IU]/ML
9 INJECTION, SOLUTION INTRAVENOUS; SUBCUTANEOUS
Status: DISCONTINUED | OUTPATIENT
Start: 2025-01-25 | End: 2025-01-28

## 2025-01-25 RX ORDER — INSULIN LISPRO 100 [IU]/ML
5 INJECTION, SOLUTION INTRAVENOUS; SUBCUTANEOUS
Status: DISCONTINUED | OUTPATIENT
Start: 2025-01-26 | End: 2025-01-28

## 2025-01-25 RX ADMIN — GUAIFENESIN 1200 MG: 600 TABLET ORAL at 23:00

## 2025-01-25 RX ADMIN — PREGABALIN 150 MG: 75 CAPSULE ORAL at 08:06

## 2025-01-25 RX ADMIN — POLYETHYLENE GLYCOL 3350 17 G: 17 POWDER, FOR SOLUTION ORAL at 08:06

## 2025-01-25 RX ADMIN — INSULIN LISPRO 1 UNITS: 100 INJECTION, SOLUTION INTRAVENOUS; SUBCUTANEOUS at 08:06

## 2025-01-25 RX ADMIN — AMOXICILLIN 500 MG: 250 CAPSULE ORAL at 05:54

## 2025-01-25 RX ADMIN — OXYBUTYNIN CHLORIDE 15 MG: 5 TABLET, EXTENDED RELEASE ORAL at 23:00

## 2025-01-25 RX ADMIN — INSULIN LISPRO 1 UNITS: 100 INJECTION, SOLUTION INTRAVENOUS; SUBCUTANEOUS at 11:20

## 2025-01-25 RX ADMIN — INSULIN LISPRO 5 UNITS: 100 INJECTION, SOLUTION INTRAVENOUS; SUBCUTANEOUS at 08:07

## 2025-01-25 RX ADMIN — HYDROMORPHONE HYDROCHLORIDE 1 MG: 1 INJECTION, SOLUTION INTRAMUSCULAR; INTRAVENOUS; SUBCUTANEOUS at 15:47

## 2025-01-25 RX ADMIN — APIXABAN 5 MG: 5 TABLET, FILM COATED ORAL at 08:06

## 2025-01-25 RX ADMIN — ATOVAQUONE 1500 MG: 750 SUSPENSION ORAL at 08:06

## 2025-01-25 RX ADMIN — Medication 5000 UNITS: at 08:06

## 2025-01-25 RX ADMIN — INSULIN LISPRO 1 UNITS: 100 INJECTION, SOLUTION INTRAVENOUS; SUBCUTANEOUS at 23:00

## 2025-01-25 RX ADMIN — CYANOCOBALAMIN TAB 500 MCG 1000 MCG: 500 TAB at 08:06

## 2025-01-25 RX ADMIN — HYDROMORPHONE HYDROCHLORIDE 1 MG: 1 INJECTION, SOLUTION INTRAMUSCULAR; INTRAVENOUS; SUBCUTANEOUS at 20:29

## 2025-01-25 RX ADMIN — OXYCODONE HYDROCHLORIDE 10 MG: 10 TABLET ORAL at 18:14

## 2025-01-25 RX ADMIN — PANCRELIPASE 48000 UNITS: 120000; 24000; 76000 CAPSULE, DELAYED RELEASE PELLETS ORAL at 08:06

## 2025-01-25 RX ADMIN — OXYCODONE HYDROCHLORIDE 10 MG: 10 TABLET ORAL at 09:51

## 2025-01-25 RX ADMIN — LEVALBUTEROL HYDROCHLORIDE 1.25 MG: 1.25 SOLUTION RESPIRATORY (INHALATION) at 07:20

## 2025-01-25 RX ADMIN — AMOXICILLIN 500 MG: 250 CAPSULE ORAL at 23:00

## 2025-01-25 RX ADMIN — POLYETHYLENE GLYCOL 3350 17 G: 17 POWDER, FOR SOLUTION ORAL at 17:43

## 2025-01-25 RX ADMIN — PANCRELIPASE 48000 UNITS: 120000; 24000; 76000 CAPSULE, DELAYED RELEASE PELLETS ORAL at 11:20

## 2025-01-25 RX ADMIN — OXYCODONE HYDROCHLORIDE 10 MG: 10 TABLET ORAL at 00:23

## 2025-01-25 RX ADMIN — PANCRELIPASE 48000 UNITS: 120000; 24000; 76000 CAPSULE, DELAYED RELEASE PELLETS ORAL at 17:43

## 2025-01-25 RX ADMIN — MORPHINE SULFATE 15 MG: 15 TABLET, EXTENDED RELEASE ORAL at 13:44

## 2025-01-25 RX ADMIN — INSULIN LISPRO 2 UNITS: 100 INJECTION, SOLUTION INTRAVENOUS; SUBCUTANEOUS at 17:43

## 2025-01-25 RX ADMIN — NYSTATIN 500000 UNITS: 100000 SUSPENSION ORAL at 08:06

## 2025-01-25 RX ADMIN — MORPHINE SULFATE 15 MG: 15 TABLET, EXTENDED RELEASE ORAL at 23:00

## 2025-01-25 RX ADMIN — TACROLIMUS 3 MG: 1 CAPSULE ORAL at 17:43

## 2025-01-25 RX ADMIN — INSULIN LISPRO 9 UNITS: 100 INJECTION, SOLUTION INTRAVENOUS; SUBCUTANEOUS at 11:20

## 2025-01-25 RX ADMIN — NYSTATIN 500000 UNITS: 100000 SUSPENSION ORAL at 11:20

## 2025-01-25 RX ADMIN — Medication 500 MG: at 17:43

## 2025-01-25 RX ADMIN — PREDNISONE 20 MG: 20 TABLET ORAL at 08:06

## 2025-01-25 RX ADMIN — BUDESONIDE AND FORMOTEROL FUMARATE DIHYDRATE 2 PUFF: 160; 4.5 AEROSOL RESPIRATORY (INHALATION) at 22:59

## 2025-01-25 RX ADMIN — GUAIFENESIN 1200 MG: 600 TABLET ORAL at 08:06

## 2025-01-25 RX ADMIN — INSULIN GLARGINE 12 UNITS: 100 INJECTION, SOLUTION SUBCUTANEOUS at 08:06

## 2025-01-25 RX ADMIN — HYDROMORPHONE HYDROCHLORIDE 1 MG: 1 INJECTION, SOLUTION INTRAMUSCULAR; INTRAVENOUS; SUBCUTANEOUS at 02:11

## 2025-01-25 RX ADMIN — HYDROMORPHONE HYDROCHLORIDE 1 MG: 1 INJECTION, SOLUTION INTRAMUSCULAR; INTRAVENOUS; SUBCUTANEOUS at 11:20

## 2025-01-25 RX ADMIN — PANTOPRAZOLE SODIUM 40 MG: 40 TABLET, DELAYED RELEASE ORAL at 06:06

## 2025-01-25 RX ADMIN — PREGABALIN 150 MG: 75 CAPSULE ORAL at 17:43

## 2025-01-25 RX ADMIN — BISACODYL 5 MG: 5 TABLET, COATED ORAL at 22:59

## 2025-01-25 RX ADMIN — MORPHINE SULFATE 15 MG: 15 TABLET, EXTENDED RELEASE ORAL at 05:54

## 2025-01-25 RX ADMIN — BUDESONIDE AND FORMOTEROL FUMARATE DIHYDRATE 2 PUFF: 160; 4.5 AEROSOL RESPIRATORY (INHALATION) at 10:15

## 2025-01-25 RX ADMIN — APIXABAN 5 MG: 5 TABLET, FILM COATED ORAL at 17:44

## 2025-01-25 RX ADMIN — TACROLIMUS 3 MG: 1 CAPSULE ORAL at 08:06

## 2025-01-25 RX ADMIN — AMOXICILLIN 500 MG: 250 CAPSULE ORAL at 13:44

## 2025-01-25 RX ADMIN — FAMOTIDINE 40 MG: 20 TABLET, FILM COATED ORAL at 23:00

## 2025-01-25 RX ADMIN — OXYCODONE HYDROCHLORIDE AND ACETAMINOPHEN 250 MG: 500 TABLET ORAL at 08:06

## 2025-01-25 RX ADMIN — Medication 500 MG: at 08:06

## 2025-01-25 RX ADMIN — LEVALBUTEROL HYDROCHLORIDE 1.25 MG: 1.25 SOLUTION RESPIRATORY (INHALATION) at 19:54

## 2025-01-25 RX ADMIN — NYSTATIN 500000 UNITS: 100000 SUSPENSION ORAL at 17:43

## 2025-01-25 RX ADMIN — SODIUM CHLORIDE SOLN NEBU 3% 4 ML: 3 NEBU SOLN at 19:54

## 2025-01-25 RX ADMIN — FUROSEMIDE 40 MG: 40 TABLET ORAL at 08:06

## 2025-01-25 RX ADMIN — INSULIN LISPRO 12 UNITS: 100 INJECTION, SOLUTION INTRAVENOUS; SUBCUTANEOUS at 17:44

## 2025-01-25 RX ADMIN — PANTOPRAZOLE SODIUM 40 MG: 40 TABLET, DELAYED RELEASE ORAL at 17:43

## 2025-01-25 RX ADMIN — VALACYCLOVIR HYDROCHLORIDE 500 MG: 500 TABLET, FILM COATED ORAL at 08:06

## 2025-01-25 RX ADMIN — SODIUM CHLORIDE SOLN NEBU 3% 4 ML: 3 NEBU SOLN at 07:20

## 2025-01-25 RX ADMIN — NYSTATIN 500000 UNITS: 100000 SUSPENSION ORAL at 22:59

## 2025-01-25 NOTE — RESPIRATORY THERAPY NOTE
RT Protocol Note  Maximilian RAY Sourav Mann 49 y.o. female MRN: [id number]  Unit/Bed#: -01 Encounter: [id number]    Assessment    Principal Problem:    Cough  Active Problems:    Type 1 diabetes mellitus with diabetic chronic kidney disease, unspecified CKD stage ([facility])    Pancreatic insufficiency    GEOFF (acute kidney injury) ([facility])    GERD (gastroesophageal reflux disease)    Hx of lung transplant ([facility])    History of DVT (deep vein thrombosis)    SIRS (systemic inflammatory response syndrome) ([facility])    Pleuritic chest pain    Lactic acidosis    Acute respiratory insufficiency      Home Pulmonary Medications:     01/24/25 2029   Respiratory Protocol   Protocol Initiated? Yes   Protocol Selection Respiratory   Language Barrier? No   Medical & Social History Reviewed? Yes   Diagnostic Studies Reviewed? Yes   Physical Assessment Performed? Yes   Respiratory Plan Home Bronchodilator Patient pathway   Respiratory Assessment   Assessment Type During-treatment   Respiratory Pattern Normal   Chest Assessment Chest expansion symmetrical   Bilateral Breath Sounds Diminished   Cough None   Resp Comments Will continue with current tx plan   O2 Device RA   Cough Description   Sputum Amount None   Additional Assessments   Pulse 85   Respirations 18   SpO2 95 %            Past Medical History:   Diagnosis Date    Abnormal uterine bleeding (AUB) 7/7/2022    ABPA (allergic bronchopulmonary aspergillosis) ([facility]) 07/08/2012    Acute kidney injury ([facility]) 09/08/2017    Acute on chronic respiratory failure ([facility]) 03/04/2017    Last Assessment & Plan:  Formatting of this note might be different from the original. Recurrent with clinical worsening with enlarging PTX, s/p multiple chest tubes, but new fevers 6/19 and worsening leukocytosis indicating infection as cause of 6/19 decompensation with profound hypoxia in setting of shunting through right lung and continued pneumothorax s/p 2nd chest tube placement.  6/27: Right    Anaphylaxis  2022    Anemia     Asthma     Chest pain 2021    Chronic pain     Chronic respiratory failure with hypoxia (HCC) 2021    COVID-19 2022    Cystic fibrosis (HCC)     Deep vein thrombosis (HCC)     Diabetes mellitus (HCC)     Fever 2024    GERD (gastroesophageal reflux disease)     HBP (high blood pressure)     HCAP (healthcare-associated pneumonia) 2020    History of transfusion     Influenza B 2020    Kidney stone     Moderate protein-calorie malnutrition (HCC) 2022    Neutropenic fever  (HCC) 2020    Pneumonia due to Pseudomonas species ([facility]) 10/28/2010    Last Assessment & Plan:  Formatting of this note might be different from the original. Polymicrobial pna w/ pseudomonas, M. Abscessus, Cadida glabrata/albicans    PONV (postoperative nausea and vomiting)     Pseudomonas aeruginosa infection 2012    Renal calculus     Squamous cell skin cancer 2023    SCCIS- Left upper thigh    Transplant recipient     Lung- 2017    Ulcerative colitis ([facility])     Wellness examination 2013    Formatting of this note might be different from the original. Discussed with patient and acknowledgement form signed on 2013 (ALYSHA)     Social History     Socioeconomic History    Marital status: /Civil Union     Spouse name: None    Number of children: None    Years of education: 12    Highest education level: None   Occupational History    Occupation: Disabled    Tobacco Use    Smoking status: Former     Current packs/day: 0.00     Average packs/day: 0.5 packs/day for 12.0 years (6.0 ttl pk-yrs)     Types: Cigarettes     Start date: 1993     Quit date: 2005     Years since quittin.0     Passive exposure: Past    Smokeless tobacco: Never   Vaping Use    Vaping status: Never Used   Substance and Sexual Activity    Alcohol use: Yes     Alcohol/week: 2.0 standard drinks of alcohol     Types: 2 Glasses of wine per week     Comment: social    Drug use:  Yes     Frequency: 2.0 times per week     Types: Marijuana     Comment: medical edible marijuana prescribed    Sexual activity: Yes     Partners: Male     Birth control/protection: None   Other Topics Concern    None   Social History Narrative    Most recent tobacco use screenin2019    Do you currently or have you served in the [employer]: No    Were you activated, into active duty, as a member of the [employer] or as a Reservist: No    Occupation: disabled    Education: 12    Marital status:     Exercise level: Occasional    Diet: Specific    low sugar, high fat    General stress level: High    Alcohol intake: Occasional    Caffeine intake: Heavy    Chewing tobacco: none    Illicit drugs: none    Guns present in home: No    Seat belts used routinely: Yes    Smoke alarm in home: Yes    Advance directive: Yes     Social Drivers of Health     Financial Resource Strain: Low Risk  (11/10/2022)    Received from [facility] [employer], [facility]    Overall Financial Resource Strain (CARDIA)     Difficulty of Paying Living Expenses: Not hard at all   Food Insecurity: No Food Insecurity (2025)    Hunger Vital Sign     Worried About Running Out of Food in the Last Year: Never true     Ran Out of Food in the Last Year: Never true   Transportation Needs: No Transportation Needs (2025)    PRAPARE - Transportation     Lack of Transportation (Medical): No     Lack of Transportation (Non-Medical): No   Physical Activity: Insufficiently Active (1/15/2024)    Exercise Vital Sign     Days of Exercise per Week: 7 days     Minutes of Exercise per Session: 20 min   Stress: No Stress Concern Present (1/15/2024)    [place] [employer] - Occupational Stress Questionnaire     Feeling of Stress : Only a little   Social Connections: Unknown (2024)    Received from [employer]    [facility]     How often do you feel lonely or  "isolated from those around you? (Adult - for ages 18 years and over): Not on file   Intimate Partner Violence: Unknown (1/20/2025)    Nursing IPS     Feels Physically and Emotionally Safe: Not on file     Physically Hurt by Someone: Not on file     Humiliated or Emotionally Abused by Someone: Not on file     Physically Hurt by Someone: No     Hurt or Threatened by Someone: No   Housing Stability: Low Risk  (1/23/2025)    Housing Stability Vital Sign     Unable to Pay for Housing in the Last Year: No     Number of Times Moved in the Last Year: 0     Homeless in the Last Year: No       Subjective         Objective    Physical Exam:   Assessment Type: During-treatment  Respiratory Pattern: Normal  Chest Assessment: Chest expansion symmetrical  Bilateral Breath Sounds: Diminished  L Breath Sounds: Coarse, Diminished  Cough: None  O2 Device: RA    Vitals:  Blood pressure (!) 156/107, pulse 85, temperature 98.2 °F (36.8 °C), resp. rate 18, height 5' 2\" (1.575 m), weight 60.3 kg (132 lb 15 oz), last menstrual period 01/20/2025, SpO2 95%, not currently breastfeeding.          Imaging and other studies: Results Review Statement: No pertinent imaging studies reviewed.    O2 Device: RA     Plan    Respiratory Plan: Home Bronchodilator Patient pathway        Resp Comments: Will continue with current tx plan   "

## 2025-01-25 NOTE — ASSESSMENT & PLAN NOTE
Blood Sugar Average: Last 72 hrs:(P) [id number]  Uncontrolled.  Will increase Lantus to 16 units and Humalog with meals    Sliding scale coverage  Hypoglycemia protocol  Diabetic diet

## 2025-01-25 NOTE — PLAN OF CARE
Problem: PAIN - ADULT  Goal: Verbalizes/displays adequate comfort level or baseline comfort level  Description: Interventions:  - Encourage patient to monitor pain and request assistance  - Assess pain using appropriate pain scale  - Administer analgesics based on type and severity of pain and evaluate response  - Implement non-pharmacological measures as appropriate and evaluate response  - Consider cultural and social influences on pain and pain management  - Notify physician/advanced practitioner if interventions unsuccessful or patient reports new pain  Outcome: Progressing     Problem: INFECTION - ADULT  Goal: Absence or prevention of progression during hospitalization  Description: INTERVENTIONS:  - Assess and monitor for signs and symptoms of infection  - Monitor lab/diagnostic results  - Monitor all insertion sites, i.e. indwelling lines, tubes, and drains  - Monitor endotracheal if appropriate and nasal secretions for changes in amount and color  - [facility] appropriate cooling/warming therapies per order  - Administer medications as ordered  - Instruct and encourage patient and family to use good hand hygiene technique  - Identify and instruct in appropriate isolation precautions for identified infection/condition  Outcome: Progressing  Goal: Absence of fever/infection during neutropenic period  Description: INTERVENTIONS:  - Monitor WBC    Outcome: Progressing     Problem: SAFETY ADULT  Goal: Patient will remain free of falls  Description: INTERVENTIONS:  - Educate patient/family on patient safety including physical limitations  - Instruct patient to call for assistance with activity   - Consult OT/PT to assist with strengthening/mobility   - Keep Call bell within reach  - Keep bed low and locked with side rails adjusted as appropriate  - Keep care items and personal belongings within reach  - Initiate and maintain comfort rounds  - Make Fall Risk Sign visible to staff  - Offer Toileting every 3 Hours,  in advance of need  - Initiate/Maintain bed alarm  - Obtain necessary fall risk management equipment:   - Apply yellow socks and bracelet for high fall risk patients  - Consider moving patient to room near nurses station  Outcome: Progressing  Goal: Maintain or return to baseline ADL function  Description: INTERVENTIONS:  -  Assess patient's ability to carry out ADLs; assess patient's baseline for ADL function and identify physical deficits which impact ability to perform ADLs (bathing, care of mouth/teeth, toileting, grooming, dressing, etc.)  - Assess/evaluate cause of self-care deficits   - Assess range of motion  - Assess patient's mobility; develop plan if impaired  - Assess patient's need for assistive devices and provide as appropriate  - Encourage maximum independence but intervene and supervise when necessary  - Involve family in performance of ADLs  - Assess for home care needs following discharge   - Consider OT consult to assist with ADL evaluation and planning for discharge  - Provide patient education as appropriate  Outcome: Progressing  Goal: Maintains/Returns to pre admission functional level  Description: INTERVENTIONS:  - Perform AM-PAC 6 Click Basic Mobility/ Daily Activity assessment daily.  - Set and communicate daily mobility goal to care team and patient/family/caregiver.   - Collaborate with rehabilitation services on mobility goals if consulted  - Perform Range of Motion 3 times a day.  - Reposition patient every 3 hours.  - Dangle patient 3 times a day  - Stand patient 3 times a day  - Ambulate patient 3 times a day  - Out of bed to chair 3 times a day   - Out of bed for meals 3 times a day  - Out of bed for toileting  - Record patient progress and toleration of activity level   Outcome: Progressing     Problem: DISCHARGE PLANNING  Goal: Discharge to home or other facility with appropriate resources  Description: INTERVENTIONS:  - Identify barriers to discharge w/patient and caregiver  -  Arrange for needed discharge resources and transportation as appropriate  - Identify discharge learning needs (meds, wound care, etc.)  - Arrange for interpretive services to assist at discharge as needed  - Refer to Case Management Department for coordinating discharge planning if the patient needs post-hospital services based on physician/advanced practitioner order or complex needs related to functional status, cognitive ability, or social support system  Outcome: Progressing     Problem: Knowledge Deficit  Goal: Patient/family/caregiver demonstrates understanding of disease process, treatment plan, medications, and discharge instructions  Description: Complete learning assessment and assess knowledge base.  Interventions:  - Provide teaching at level of understanding  - Provide teaching via preferred learning methods  Outcome: Progressing     Problem: RESPIRATORY - ADULT  Goal: Achieves optimal ventilation and oxygenation  Description: INTERVENTIONS:  - Assess for changes in respiratory status  - Assess for changes in mentation and behavior  - Position to facilitate oxygenation and minimize respiratory effort  - Oxygen administered by appropriate delivery if ordered  - Initiate smoking cessation education as indicated  - Encourage broncho-pulmonary hygiene including cough, deep breathe, Incentive Spirometry  - Assess the need for suctioning and aspirate as needed  - Assess and instruct to report SOB or any respiratory difficulty  - Respiratory Therapy support as indicated  Outcome: Progressing

## 2025-01-25 NOTE — ASSESSMENT & PLAN NOTE
Patient presentation due to sore throat, persistent cough, shortness of breath, wheezing, pleuritic chest pain starting since Friday 1/17.  Patient reports the symptoms are getting worse despite usual treatment with mucinex, nebulizers, OTC cough meds  CT chest appears similar to Nov '24 - no definitive infiltrate, but notes inflammatory tree-in-bud appearance  ID consulted for guidance due to transplant status and recurrence of symptoms  Procalcitonin normal x2 (was elevated Nov '24)  FLU/COVID/RSV/RP2 all negative   Continue respiratory protocol, supplemental O2 prn  Mucinex, albuterol, hycodan prn  1/24:   Trial hypertonic saline + albuterol nebs  Patient will consider higher steroid taper, had wanted to avoid earlier due to hyperglycemia, but given the arrest in her improvement, would be open to it if the saline nebs do not help    1/25  She would like to hold off on higher steroids, cont saline nebs hopefully times with pain medication to allow administration of saline  Increase insulin lunch/dinner/lantus

## 2025-01-25 NOTE — ASSESSMENT & PLAN NOTE
Cr 1.3 on admission, baseline Cr 0.8-1.1  Suspected prerenal due to possible acute viral illness?   Cr improved with IV fluids, will hold on further due to patient swelling/fluid retention  Given lasix 1/23 but held due to creatinine bump  Lasix resumed 1/25, trend BMP

## 2025-01-25 NOTE — ASSESSMENT & PLAN NOTE
Secondary to cystic fibrosis; Followed by [facility], s/p bilat lung transplant 2013, subsequent rejection and severe bronchiolitis obliterans   s/p second left lung transplant 2017 and right pneumonectomy  Has had recurrent admissions for pneumonia and Pseudomonas/MSSA   Patient currently taking tacrolimus 3 mg twice a day and prednisone 20 mg daily.  It was recommended by her transplant team high dose steroids however the patient was reluctant.  See below  Patient takes for prophylaxis azithromycin 250 mg 3 times a week MW, atovaquone 1500 milligrams daily for PJP prophylaxis and Valtrex 500 mg daily

## 2025-01-25 NOTE — PROGRESS NOTES
Progress Note - Hospitalist   Name: Maximilian Mann 49 y.o. female I MRN: [id number]  Unit/Bed#: -01 I Date of Admission: 1/19/2025   Date of Service: 1/25/2025 I Hospital Day: 6    Assessment & Plan  Hx of lung transplant (HCC)  Secondary to cystic fibrosis; Followed by [facility], s/p bilat lung transplant 2013, subsequent rejection and severe bronchiolitis obliterans   s/p second left lung transplant 2017 and right pneumonectomy  Has had recurrent admissions for pneumonia and Pseudomonas/MSSA   Patient currently taking tacrolimus 3 mg twice a day and prednisone 20 mg daily.  It was recommended by her transplant team high dose steroids however the patient was reluctant.  See below  Patient takes for prophylaxis azithromycin 250 mg 3 times a week MWF, atovaquone 1500 milligrams daily for PJP prophylaxis and Valtrex 500 mg daily  Cough  Patient presentation due to sore throat, persistent cough, shortness of breath, wheezing, pleuritic chest pain starting since Friday 1/17.  Patient reports the symptoms are getting worse despite usual treatment with mucinex, nebulizers, OTC cough meds  CT chest appears similar to Nov '24 - no definitive infiltrate, but notes inflammatory tree-in-bud appearance  ID consulted for guidance due to transplant status and recurrence of symptoms  Procalcitonin normal x2 (was elevated Nov '24)  FLU/COVID/RSV/RP2 all negative   Continue respiratory protocol, supplemental O2 prn  Mucinex, albuterol, hycodan prn  1/24:   Trial hypertonic saline + albuterol nebs  Patient will consider higher steroid taper, had wanted to avoid earlier due to hyperglycemia, but given the arrest in her improvement, would be open to it if the saline nebs do not help    1/25  She would like to hold off on higher steroids, cont saline nebs hopefully times with pain medication to allow administration of saline  Increase insulin lunch/dinner/lantus  SIRS (systemic inflammatory response syndrome)  ([facility])  Tachycardia, tachypnea. She remains afebrile and without leukocytosis or elevated procalcitonin. RP2 negative.  ID consulted, no clear new infectious etiology  Sputum GBS and candida, appears to be colonized   Ceftriaxone x3 days, switched to amoxicillin through 1/25  Nystatin swish/swallow per patient request   Type 1 diabetes mellitus with diabetic chronic kidney disease, unspecified CKD stage ([facility])  Blood Sugar Average: Last 72 hrs:(P) [id number]  Uncontrolled.  Will increase Lantus to 16 units and Humalog with meals    Sliding scale coverage  Hypoglycemia protocol  Diabetic diet  Pancreatic insufficiency  Continue Creon  GEOFF (acute kidney injury) ([facility])  Cr 1.3 on admission, baseline Cr 0.8-1.1  Suspected prerenal due to possible acute viral illness?   Cr improved with IV fluids, will hold on further due to patient swelling/fluid retention  Given lasix 1/23 but held due to creatinine bump  Lasix resumed 1/25, trend BMP  GERD (gastroesophageal reflux disease)  History of severe GERD status post fundoplication in 2023   Patient currently taking dexlansoprazole 60 mg daily and famotidine 40 mg daily at bedtime  Continue formulary sub PPI + famotidine  History of DVT (deep vein thrombosis)  Continue with Eliquis 5 mg twice a day.  Pleuritic chest pain  Pain uncontrolled, now better with long-acting regimen. Continue:  Morphine ER 15 mg TID  Oxycodone 5 mg q4h prn moderate pain  Oxycodone 10 mg q4h prn severe pain  Dilaudid 1 mg q4h prn breakthrough pain  Tylenol prn  Hycodan prn  Lactic acidosis  Elevated lactic acid 2.7 and improved with IV fluids to 2.5, suspect type B lactic acid elevation secondary to albuterol  No evidence of end-organ damage (barely meets GEOFF criteria, no definitive infection)  Acute respiratory insufficiency      VTE Pharmacologic Prophylaxis: VTE Score: 3 Moderate Risk (Score 3-4) - Pharmacological DVT Prophylaxis Ordered: apixaban (Eliquis).    Mobility:   Basic Mobility  Inpatient Raw Score: 24  JH-HLM Goal: 8: Walk 250 feet or more  JH-HLM Achieved: 7: Walk 25 feet or more  JH-HLM Goal NOT achieved. Continue with multidisciplinary rounding and encourage appropriate mobility to improve upon JH-HLM goals.    Patient Centered Rounds: I performed bedside rounds with nursing staff today.   Discussions with Specialists or Other Care Team Provider: Previous notes reviewed    Education and Discussions with Family / Patient: Patient declined call to .     Current Length of Stay: 6 day(s)  Current Patient Status: Inpatient   Certification Statement: The patient will continue to require additional inpatient hospital stay due to shortness of breath  Discharge Plan: Anticipate discharge in 24-48 hrs to home.    Code Status: Level 1 - Full Code    Subjective   She feels she needs to have her IV meds tied to the saline neb (to allow her to cough/take deep breaths and take the medication better). She talked with respiratory and nursing about this. She's agreeable to more insulin.   She has no fever/chills. She would prefer to stay no her current dose of steroids and will consider increasing after a few days if no improvement. She has a good appetite.  She has no constipation.    Objective :  Temp:  [98.2 °F (36.8 °C)] 98.2 °F (36.8 °C)  HR:  [83-85] 83  BP: (135-156)/() 135/77  Resp:  [16-18] 18  SpO2:  [93 %-99 %] 98 %  O2 Device: None (Room air)    Body mass index is 24.31 kg/m².     Input and Output Summary (last 24 hours):     Intake/Output Summary (Last 24 hours) at 1/25/2025 0937  Last data filed at 1/24/2025 1801  Gross per 24 hour   Intake 1200 ml   Output --   Net 1200 ml       Physical Exam  Vitals and nursing note reviewed.   Constitutional:       Appearance: She is ill-appearing.   HENT:      Head: Normocephalic and atraumatic.      Nose: Nose normal. No congestion.      Mouth/Throat:      Mouth: Mucous membranes are moist.      Pharynx: No oropharyngeal exudate.    Eyes:      General: No scleral icterus.     Conjunctiva/sclera: Conjunctivae normal.   Cardiovascular:      Comments: Chest wall port noted  Pulmonary:      Effort: Respiratory distress present.      Breath sounds: No wheezing.      Comments: Mild acute distress  Abdominal:      General: Bowel sounds are normal.      Palpations: Abdomen is soft.      Tenderness: There is no abdominal tenderness.   Musculoskeletal:      Cervical back: Normal range of motion and neck supple. No rigidity.   Skin:     General: Skin is warm.      Coloration: Skin is not jaundiced.   Neurological:      Mental Status: She is alert and oriented to person, place, and time.   Psychiatric:         Mood and Affect: Mood normal.         Behavior: Behavior normal.           Lines/Drains:  Lines/Drains/Airways       Active Status       Name Placement date Placement time Site Days    Port A Cath 11/25/22 Right Chest 11/25/22  0953  Chest  791                    Central Line:  Goal for removal: Port accessed. Will de-access as appropriate.               Lab Results: I have reviewed the following results:   Results from last 7 days   Lab Units 01/24/25  0552   WBC Thousand/uL 6.13   HEMOGLOBIN g/dL 8.7*   HEMATOCRIT % 32.8*   PLATELETS Thousands/uL 157   SEGS PCT % 59   LYMPHO PCT % 30   MONO PCT % 10   EOS PCT % 1     Results from last 7 days   Lab Units 01/25/25  0600 01/20/25  0442 01/19/25  1249   SODIUM mmol/L 137   < > 140   POTASSIUM mmol/L 4.5   < > 4.4   CHLORIDE mmol/L 100   < > 94*   CO2 mmol/L 32   < > 39*   BUN mg/dL 40*   < > 38*   CREATININE mg/dL 1.13   < > 1.33*   ANION GAP mmol/L 5   < > 7   CALCIUM mg/dL 8.2*   < > 8.2*   ALBUMIN g/dL  --   --  3.5   TOTAL BILIRUBIN mg/dL  --   --  0.40   ALK PHOS U/L  --   --  70   ALT U/L  --   --  23   AST U/L  --   --  17   GLUCOSE RANDOM mg/dL 216*   < > 181*    < > = values in this interval not displayed.     Results from last 7 days   Lab Units 01/19/25  1249   INR  1.16     Results from  last 7 days   Lab Units 01/25/25  0638 01/24/25  2101 01/24/25  1618 01/24/25  1107 01/24/25  0626 01/23/25  2118 01/23/25  1631 01/23/25  1051 01/23/25  0616 01/22/25  2124 01/22/25  1700 01/22/25  1132   POC GLUCOSE mg/dl 204* 294* 289* 186* 188* 300* 489* 327* 140 327* 390* 217*         Results from last 7 days   Lab Units 01/20/25  0442 01/19/25  1555 01/19/25  1249   LACTIC ACID mmol/L  --  2.5* 2.7*   PROCALCITONIN ng/ml <0.05  --  0.06       Recent Cultures (last 7 days):   Results from last 7 days   Lab Units 01/19/25  1556 01/19/25  1254 01/19/25  1249   BLOOD CULTURE   --  No Growth After 5 Days. No Growth After 5 Days.   SPUTUM CULTURE  3+ Growth of Beta Hemolytic Streptococcus Group B*  4+ Growth of Candida sp. presumptively albicans*  --   --    GRAM STAIN RESULT  2+ Epithelial cells per low power field*  2+ Polys*  2+ Gram positive cocci in pairs*  2+ Budding yeast*  --   --              Last 24 Hours Medication List:     Current Facility-Administered Medications:     acetaminophen (TYLENOL) tablet 975 mg, Q6H PRN    ALPRAZolam (XANAX) tablet 1 mg, HS PRN    aluminum-magnesium hydroxide-simethicone (MAALOX) oral suspension 30 mL, Q6H PRN    amoxicillin (AMOXIL) capsule 500 mg, Q8H DOTTY    apixaban (ELIQUIS) tablet 5 mg, BID    ascorbic acid (VITAMIN C) tablet 250 mg, Daily    atovaquone (MEPRON) oral suspension 1,500 mg, Daily    azithromycin (ZITHROMAX) tablet 250 mg, Once per day on Monday Wednesday Friday    bisacodyl (DULCOLAX) EC tablet 5 mg, HS    budesonide-formoterol (SYMBICORT) 160-4.5 mcg/act inhaler 2 puff, Q12H    Cholecalciferol (VITAMIN D3) tablet 5,000 Units, Daily    cyanocobalamin (VITAMIN B-12) tablet 1,000 mcg, Daily    famotidine (PEPCID) tablet 40 mg, HS    furosemide (LASIX) tablet 40 mg, Daily    guaiFENesin (MUCINEX) 12 hr tablet 1,200 mg, Q12H DOTTY    HYDROcodone Bit-Homatrop MBr (HYCODAN) oral syrup 5 mL, Q4H PRN    HYDROmorphone (DILAUDID) injection 1 mg, Q4H PRN    insulin  glargine (LANTUS) subcutaneous injection 12 Units 0.12 mL, QAM    insulin lispro (HumALOG/ADMELOG) 100 units/mL subcutaneous injection 1-5 Units, TID AC **AND** Fingerstick Glucose (POCT), 4x Daily AC and at bedtime    insulin lispro (HumALOG/ADMELOG) 100 units/mL subcutaneous injection 1-5 Units, HS    insulin lispro (HumALOG/ADMELOG) 100 units/mL subcutaneous injection 5 Units, Daily With Breakfast **AND** insulin lispro (HumALOG/ADMELOG) 100 units/mL subcutaneous injection 7 Units, Daily With Lunch **AND** insulin lispro (HumALOG/ADMELOG) 100 units/mL subcutaneous injection 10 Units, Daily With Dinner    ipratropium-albuterol (DUO-NEB) 0.5-2.5 mg/3 mL inhalation solution 3 mL, Q6H PRN    levalbuterol (XOPENEX) inhalation solution 1.25 mg, TID    lidocaine (LIDODERM) 5 % patch 1 patch, Daily PRN    magnesium gluconate (MAGONATE) tablet 500 mg, BID    morphine (MS CONTIN) ER tablet 15 mg, Q8H DOTTY    naloxone (NARCAN) 0.04 mg/mL syringe 0.04 mg, Q1MIN PRN    nystatin (MYCOSTATIN) oral suspension 500,000 Units, 4x Daily    ondansetron (ZOFRAN) injection 4 mg, Q6H PRN    oxybutynin (DITROPAN-XL) 24 hr tablet 15 mg, HS    oxyCODONE (ROXICODONE) IR tablet 5 mg, Q4H PRN **OR** oxyCODONE (ROXICODONE) immediate release tablet 10 mg, Q4H PRN    pancrelipase (Lip-Prot-Amyl) (CREON) delayed release capsule 48,000 Units, TID With Meals    pantoprazole (PROTONIX) EC tablet 40 mg, BID AC    polyethylene glycol (MIRALAX) packet 17 g, BID    predniSONE tablet 20 mg, Daily    pregabalin (LYRICA) capsule 150 mg, BID    sodium chloride 3 % inhalation solution 4 mL, TID    tacrolimus (PROGRAF) capsule 3 mg, BID    valACYclovir (VALTREX) tablet 500 mg, Q24H    Administrative Statements   Today, Patient Was Seen By: Joann Potts MD      **Please Note: This note may have been constructed using a voice recognition system.**

## 2025-01-26 LAB
ANION GAP SERPL CALCULATED.3IONS-SCNC: 5 MMOL/L (ref 4–13)
BUN SERPL-MCNC: 49 MG/DL (ref 5–25)
CALCIUM SERPL-MCNC: 8.4 MG/DL (ref 8.4–10.2)
CHLORIDE SERPL-SCNC: 101 MMOL/L (ref 96–108)
CO2 SERPL-SCNC: 32 MMOL/L (ref 21–32)
CREAT SERPL-MCNC: 1.14 MG/DL (ref 0.6–1.3)
GFR SERPL CREATININE-BSD FRML MDRD: 57 ML/MIN/1.73SQ M
GLUCOSE SERPL-MCNC: 136 MG/DL (ref 65–140)
GLUCOSE SERPL-MCNC: 178 MG/DL (ref 65–140)
GLUCOSE SERPL-MCNC: 211 MG/DL (ref 65–140)
GLUCOSE SERPL-MCNC: 34 MG/DL (ref 65–140)
GLUCOSE SERPL-MCNC: 54 MG/DL (ref 65–140)
GLUCOSE SERPL-MCNC: 93 MG/DL (ref 65–140)
GLUCOSE SERPL-MCNC: 94 MG/DL (ref 65–140)
POTASSIUM SERPL-SCNC: 4.2 MMOL/L (ref 3.5–5.3)
SODIUM SERPL-SCNC: 138 MMOL/L (ref 135–147)

## 2025-01-26 PROCEDURE — 80048 BASIC METABOLIC PNL TOTAL CA: CPT | Performed by: INTERNAL MEDICINE

## 2025-01-26 PROCEDURE — 82948 REAGENT STRIP/BLOOD GLUCOSE: CPT

## 2025-01-26 PROCEDURE — 94664 DEMO&/EVAL PT USE INHALER: CPT

## 2025-01-26 PROCEDURE — 94640 AIRWAY INHALATION TREATMENT: CPT

## 2025-01-26 PROCEDURE — 94668 MNPJ CHEST WALL SBSQ: CPT

## 2025-01-26 PROCEDURE — 94760 N-INVAS EAR/PLS OXIMETRY 1: CPT

## 2025-01-26 PROCEDURE — 99232 SBSQ HOSP IP/OBS MODERATE 35: CPT | Performed by: INTERNAL MEDICINE

## 2025-01-26 RX ORDER — HYDROMORPHONE HYDROCHLORIDE 2 MG/ML
1.5 INJECTION, SOLUTION INTRAMUSCULAR; INTRAVENOUS; SUBCUTANEOUS EVERY 6 HOURS PRN
Status: DISCONTINUED | OUTPATIENT
Start: 2025-01-26 | End: 2025-01-28 | Stop reason: HOSPADM

## 2025-01-26 RX ORDER — DIPHENHYDRAMINE HCL 25 MG
25 TABLET ORAL EVERY 6 HOURS PRN
Status: DISCONTINUED | OUTPATIENT
Start: 2025-01-26 | End: 2025-01-28 | Stop reason: HOSPADM

## 2025-01-26 RX ADMIN — BISACODYL 5 MG: 5 TABLET, COATED ORAL at 22:33

## 2025-01-26 RX ADMIN — SODIUM CHLORIDE SOLN NEBU 3% 4 ML: 3 NEBU SOLN at 19:40

## 2025-01-26 RX ADMIN — MORPHINE SULFATE 15 MG: 15 TABLET, EXTENDED RELEASE ORAL at 06:34

## 2025-01-26 RX ADMIN — INSULIN LISPRO 12 UNITS: 100 INJECTION, SOLUTION INTRAVENOUS; SUBCUTANEOUS at 17:31

## 2025-01-26 RX ADMIN — OXYCODONE HYDROCHLORIDE 10 MG: 10 TABLET ORAL at 17:40

## 2025-01-26 RX ADMIN — PANCRELIPASE 48000 UNITS: 120000; 24000; 76000 CAPSULE, DELAYED RELEASE PELLETS ORAL at 08:50

## 2025-01-26 RX ADMIN — PREDNISONE 20 MG: 20 TABLET ORAL at 08:50

## 2025-01-26 RX ADMIN — AMOXICILLIN 500 MG: 250 CAPSULE ORAL at 15:13

## 2025-01-26 RX ADMIN — MORPHINE SULFATE 15 MG: 15 TABLET, EXTENDED RELEASE ORAL at 15:09

## 2025-01-26 RX ADMIN — POLYETHYLENE GLYCOL 3350 17 G: 17 POWDER, FOR SOLUTION ORAL at 17:30

## 2025-01-26 RX ADMIN — VALACYCLOVIR HYDROCHLORIDE 500 MG: 500 TABLET, FILM COATED ORAL at 08:50

## 2025-01-26 RX ADMIN — HYDROMORPHONE HYDROCHLORIDE 1.5 MG: 2 INJECTION INTRAMUSCULAR; INTRAVENOUS; SUBCUTANEOUS at 15:09

## 2025-01-26 RX ADMIN — HYDROMORPHONE HYDROCHLORIDE 1 MG: 1 INJECTION, SOLUTION INTRAMUSCULAR; INTRAVENOUS; SUBCUTANEOUS at 05:36

## 2025-01-26 RX ADMIN — BUDESONIDE AND FORMOTEROL FUMARATE DIHYDRATE 2 PUFF: 160; 4.5 AEROSOL RESPIRATORY (INHALATION) at 09:36

## 2025-01-26 RX ADMIN — PANTOPRAZOLE SODIUM 40 MG: 40 TABLET, DELAYED RELEASE ORAL at 06:35

## 2025-01-26 RX ADMIN — MORPHINE SULFATE 15 MG: 15 TABLET, EXTENDED RELEASE ORAL at 23:43

## 2025-01-26 RX ADMIN — INSULIN LISPRO 5 UNITS: 100 INJECTION, SOLUTION INTRAVENOUS; SUBCUTANEOUS at 08:50

## 2025-01-26 RX ADMIN — NYSTATIN 500000 UNITS: 100000 SUSPENSION ORAL at 11:44

## 2025-01-26 RX ADMIN — BUDESONIDE AND FORMOTEROL FUMARATE DIHYDRATE 2 PUFF: 160; 4.5 AEROSOL RESPIRATORY (INHALATION) at 22:34

## 2025-01-26 RX ADMIN — OXYCODONE HYDROCHLORIDE AND ACETAMINOPHEN 250 MG: 500 TABLET ORAL at 08:50

## 2025-01-26 RX ADMIN — AMOXICILLIN 500 MG: 250 CAPSULE ORAL at 06:34

## 2025-01-26 RX ADMIN — FAMOTIDINE 40 MG: 20 TABLET, FILM COATED ORAL at 22:33

## 2025-01-26 RX ADMIN — TACROLIMUS 3 MG: 1 CAPSULE ORAL at 17:30

## 2025-01-26 RX ADMIN — OXYCODONE HYDROCHLORIDE 10 MG: 10 TABLET ORAL at 08:50

## 2025-01-26 RX ADMIN — HYDROMORPHONE HYDROCHLORIDE 1 MG: 1 INJECTION, SOLUTION INTRAMUSCULAR; INTRAVENOUS; SUBCUTANEOUS at 11:05

## 2025-01-26 RX ADMIN — LEVALBUTEROL HYDROCHLORIDE 1.25 MG: 1.25 SOLUTION RESPIRATORY (INHALATION) at 19:40

## 2025-01-26 RX ADMIN — HYDROMORPHONE HYDROCHLORIDE 1 MG: 1 INJECTION, SOLUTION INTRAMUSCULAR; INTRAVENOUS; SUBCUTANEOUS at 00:39

## 2025-01-26 RX ADMIN — ALPRAZOLAM 1 MG: 0.5 TABLET ORAL at 22:33

## 2025-01-26 RX ADMIN — PANCRELIPASE 48000 UNITS: 120000; 24000; 76000 CAPSULE, DELAYED RELEASE PELLETS ORAL at 17:30

## 2025-01-26 RX ADMIN — NYSTATIN 500000 UNITS: 100000 SUSPENSION ORAL at 22:34

## 2025-01-26 RX ADMIN — OXYBUTYNIN CHLORIDE 15 MG: 5 TABLET, EXTENDED RELEASE ORAL at 22:33

## 2025-01-26 RX ADMIN — GUAIFENESIN 1200 MG: 600 TABLET ORAL at 22:33

## 2025-01-26 RX ADMIN — INSULIN GLARGINE 16 UNITS: 100 INJECTION, SOLUTION SUBCUTANEOUS at 08:50

## 2025-01-26 RX ADMIN — SODIUM CHLORIDE SOLN NEBU 3% 4 ML: 3 NEBU SOLN at 07:16

## 2025-01-26 RX ADMIN — NYSTATIN 500000 UNITS: 100000 SUSPENSION ORAL at 08:50

## 2025-01-26 RX ADMIN — ATOVAQUONE 1500 MG: 750 SUSPENSION ORAL at 08:50

## 2025-01-26 RX ADMIN — APIXABAN 5 MG: 5 TABLET, FILM COATED ORAL at 08:50

## 2025-01-26 RX ADMIN — PREGABALIN 150 MG: 75 CAPSULE ORAL at 17:30

## 2025-01-26 RX ADMIN — ALPRAZOLAM 1 MG: 0.5 TABLET ORAL at 01:26

## 2025-01-26 RX ADMIN — NYSTATIN 500000 UNITS: 100000 SUSPENSION ORAL at 17:30

## 2025-01-26 RX ADMIN — PANTOPRAZOLE SODIUM 40 MG: 40 TABLET, DELAYED RELEASE ORAL at 15:09

## 2025-01-26 RX ADMIN — APIXABAN 5 MG: 5 TABLET, FILM COATED ORAL at 17:30

## 2025-01-26 RX ADMIN — INSULIN LISPRO 9 UNITS: 100 INJECTION, SOLUTION INTRAVENOUS; SUBCUTANEOUS at 11:45

## 2025-01-26 RX ADMIN — Medication 500 MG: at 08:50

## 2025-01-26 RX ADMIN — INSULIN LISPRO 1 UNITS: 100 INJECTION, SOLUTION INTRAVENOUS; SUBCUTANEOUS at 17:30

## 2025-01-26 RX ADMIN — PANCRELIPASE 48000 UNITS: 120000; 24000; 76000 CAPSULE, DELAYED RELEASE PELLETS ORAL at 11:44

## 2025-01-26 RX ADMIN — TACROLIMUS 3 MG: 1 CAPSULE ORAL at 08:49

## 2025-01-26 RX ADMIN — INSULIN LISPRO 2 UNITS: 100 INJECTION, SOLUTION INTRAVENOUS; SUBCUTANEOUS at 11:44

## 2025-01-26 RX ADMIN — LEVALBUTEROL HYDROCHLORIDE 1.25 MG: 1.25 SOLUTION RESPIRATORY (INHALATION) at 07:15

## 2025-01-26 RX ADMIN — CYANOCOBALAMIN TAB 500 MCG 1000 MCG: 500 TAB at 08:50

## 2025-01-26 RX ADMIN — FUROSEMIDE 40 MG: 40 TABLET ORAL at 08:50

## 2025-01-26 RX ADMIN — Medication 5000 UNITS: at 08:50

## 2025-01-26 RX ADMIN — HYDROMORPHONE HYDROCHLORIDE 1.5 MG: 2 INJECTION INTRAMUSCULAR; INTRAVENOUS; SUBCUTANEOUS at 21:58

## 2025-01-26 RX ADMIN — AMOXICILLIN 500 MG: 250 CAPSULE ORAL at 22:33

## 2025-01-26 RX ADMIN — Medication 500 MG: at 17:30

## 2025-01-26 RX ADMIN — POLYETHYLENE GLYCOL 3350 17 G: 17 POWDER, FOR SOLUTION ORAL at 08:49

## 2025-01-26 RX ADMIN — DIPHENHYDRAMINE HYDROCHLORIDE 25 MG: 25 TABLET ORAL at 09:45

## 2025-01-26 RX ADMIN — PREGABALIN 150 MG: 75 CAPSULE ORAL at 08:50

## 2025-01-26 RX ADMIN — OXYCODONE HYDROCHLORIDE 10 MG: 10 TABLET ORAL at 03:21

## 2025-01-26 RX ADMIN — GUAIFENESIN 1200 MG: 600 TABLET ORAL at 08:50

## 2025-01-26 NOTE — ASSESSMENT & PLAN NOTE
Cr 1.3 on admission, baseline Cr 0.8-1.1  Suspected prerenal due to possible acute viral illness?   Cr improved with IV fluids, will hold on further due to patient swelling/fluid retention  Given lasix 1/23 but held due to creatinine bump  Lasix resumed 1/25, trend BMP which continues to be stable

## 2025-01-26 NOTE — PROGRESS NOTES
Progress Note - Hospitalist   Name: Maximilian Mann 49 y.o. female I MRN: [id number]  Unit/Bed#: -01 I Date of Admission: 1/19/2025   Date of Service: 1/26/2025 I Hospital Day: 7    Assessment & Plan  Hx of lung transplant (HCC)  Secondary to cystic fibrosis; Followed by [facility]cdon[facility], s/p bilat lung transplant 2013, subsequent rejection and severe bronchiolitis obliterans   s/p second left lung transplant 2017 and right pneumonectomy  Has had recurrent admissions for pneumonia and Pseudomonas/MSSA   Patient currently taking tacrolimus 3 mg twice a day and prednisone 20 mg daily.  It was recommended by her transplant team high dose steroids however the patient was reluctant.  See below  Patient takes for prophylaxis azithromycin 250 mg 3 times a week MWF, atovaquone 1500 milligrams daily for PJP prophylaxis and Valtrex 500 mg daily  Cough  Patient presentation due to sore throat, persistent cough, shortness of breath, wheezing, pleuritic chest pain starting since Friday 1/17.  Patient reports the symptoms are getting worse despite usual treatment with mucinex, nebulizers, OTC cough meds  CT chest appears similar to Nov '24 - no definitive infiltrate, but notes inflammatory tree-in-bud appearance  ID consulted for guidance due to transplant status and recurrence of symptoms  Procalcitonin normal x2 (was elevated Nov '24)  FLU/COVID/RSV/RP2 all negative   Continue respiratory protocol, supplemental O2 prn  Mucinex, albuterol, hycodan prn  1/24:   Continue hypertonic saline + albuterol nebs  Patient will consider higher steroid taper, had wanted to avoid earlier due to hyperglycemia, but given the arrest in her improvement, would be open to it if the saline nebs do not help    1/25  She would like to hold off on higher steroids, cont saline nebs hopefully times with pain medication to allow administration of saline  Increase insulin lunch/dinner/lantus  1/26  She feels better. Continue current course.  SIRS  (systemic inflammatory response syndrome) (HCC)  Tachycardia, tachypnea. She remains afebrile and without leukocytosis or elevated procalcitonin. RP2 negative.  ID consulted, no clear new infectious etiology  Sputum GBS and candida, appears to be colonized   Ceftriaxone x3 days, switched to amoxicillin through 1/25  Nystatin swish/swallow per patient request   Type 1 diabetes mellitus with diabetic chronic kidney disease, unspecified CKD stage ([facility])  Blood Sugar Average: Last 72 hrs:(P) [id number]  Uncontrolled.  Improved control with Lantus to 16 units and increasing Humalog 2 units with lunch and dinner    Sliding scale coverage  Hypoglycemia protocol  Diabetic diet  Pancreatic insufficiency  Continue Creon  GEOFF (acute kidney injury) ([facility])  Cr 1.3 on admission, baseline Cr 0.8-1.1  Suspected prerenal due to possible acute viral illness?   Cr improved with IV fluids, will hold on further due to patient swelling/fluid retention  Given lasix 1/23 but held due to creatinine bump  Lasix resumed 1/25, trend BMP which continues to be stable  GERD (gastroesophageal reflux disease)  History of severe GERD status post fundoplication in 2023   Patient currently taking dexlansoprazole 60 mg daily and famotidine 40 mg daily at bedtime  Continue formulary sub PPI + famotidine  History of DVT (deep vein thrombosis)  Continue with Eliquis 5 mg twice a day.  Pleuritic chest pain  Pain uncontrolled, now better with long-acting regimen. Continue:  Morphine ER 15 mg TID  Oxycodone 5 mg q4h prn moderate pain  Oxycodone 10 mg q4h prn severe pain  Dilaudid 1 mg q4h prn breakthrough pain  Tylenol prn  Hycodan prn  Lactic acidosis  Elevated lactic acid 2.7 and improved with IV fluids to 2.5, suspect type B lactic acid elevation secondary to albuterol  No evidence of end-organ damage (barely meets GEOFF criteria, no definitive infection)  Acute respiratory insufficiency      VTE Pharmacologic Prophylaxis: VTE Score: 3 Moderate Risk  (Score 3-4) - Pharmacological DVT Prophylaxis Ordered: apixaban (Eliquis).    Mobility:   Basic Mobility Inpatient Raw Score: 24  JH-HLM Goal: 8: Walk 250 feet or more  JH-HLM Achieved: 8: Walk 250 feet ot more  JH-HLM Goal achieved. Continue to encourage appropriate mobility.    Patient Centered Rounds: I performed bedside rounds with nursing staff today.   Discussions with Specialists or Other Care Team Provider: None    Education and Discussions with Family / Patient: Patient declined call to .     Current Length of Stay: 7 day(s)  Current Patient Status: Inpatient   Certification Statement: The patient will continue to require additional inpatient hospital stay due to shortness of breath  Discharge Plan: Anticipate discharge in 48-72 hrs to home.    Code Status: Level 1 - Full Code    Subjective   She has some dry skin that it itchy around her right arm. She denies fever/chills.  Her breathing is better with the saline nebs,  she has no vision changes. She feels better and is still hoping to stay way from higher steroids.  She is happy her glucose is better controlled.    Objective :  Temp:  [98 °F (36.7 °C)] 98 °F (36.7 °C)  HR:  [95] 95  BP: (126-156)/(74-82) 126/75  Resp:  [16-20] 16  SpO2:  [95 %] 95 %  O2 Device: None (Room air)    Body mass index is 24.31 kg/m².     Input and Output Summary (last 24 hours):     Intake/Output Summary (Last 24 hours) at 1/26/2025 0859  Last data filed at 1/26/2025 0801  Gross per 24 hour   Intake 1920 ml   Output --   Net 1920 ml       Physical Exam  Vitals and nursing note reviewed.   Constitutional:       Appearance: Normal appearance. She is not ill-appearing or diaphoretic.   HENT:      Head: Normocephalic and atraumatic.      Nose: Nose normal. No congestion.      Mouth/Throat:      Mouth: Mucous membranes are moist.      Pharynx: No oropharyngeal exudate.   Eyes:      General: No scleral icterus.     Conjunctiva/sclera: Conjunctivae normal.    Cardiovascular:      Rate and Rhythm: Normal rate and regular rhythm.   Pulmonary:      Effort: Pulmonary effort is normal. No respiratory distress.      Comments: Course rhonchi  Abdominal:      General: Bowel sounds are normal. There is no distension.      Palpations: Abdomen is soft.      Tenderness: There is no abdominal tenderness.   Musculoskeletal:      Cervical back: Normal range of motion and neck supple. No rigidity.   Skin:     General: Skin is warm.      Coloration: Skin is not jaundiced.   Neurological:      Mental Status: She is alert and oriented to person, place, and time.   Psychiatric:         Mood and Affect: Mood normal.         Behavior: Behavior normal.           Lines/Drains:  Lines/Drains/Airways       Active Status       Name Placement date Placement time Site Days    Port A Cath 11/25/22 Right Chest 11/25/22  0953  Chest  792                    Central Line:  Goal for removal: Port accessed. Will de-access as appropriate.               Lab Results: I have reviewed the following results:   Results from last 7 days   Lab Units 01/24/25  0552   WBC Thousand/uL 6.13   HEMOGLOBIN g/dL 8.7*   HEMATOCRIT % 32.8*   PLATELETS Thousands/uL 157   SEGS PCT % 59   LYMPHO PCT % 30   MONO PCT % 10   EOS PCT % 1     Results from last 7 days   Lab Units 01/26/25  0541 01/20/25  0442 01/19/25  1249   SODIUM mmol/L 138   < > 140   POTASSIUM mmol/L 4.2   < > 4.4   CHLORIDE mmol/L 101   < > 94*   CO2 mmol/L 32   < > 39*   BUN mg/dL 49*   < > 38*   CREATININE mg/dL 1.14   < > 1.33*   ANION GAP mmol/L 5   < > 7   CALCIUM mg/dL 8.4   < > 8.2*   ALBUMIN g/dL  --   --  3.5   TOTAL BILIRUBIN mg/dL  --   --  0.40   ALK PHOS U/L  --   --  70   ALT U/L  --   --  23   AST U/L  --   --  17   GLUCOSE RANDOM mg/dL 94   < > 181*    < > = values in this interval not displayed.     Results from last 7 days   Lab Units 01/19/25  1249   INR  1.16     Results from last 7 days   Lab Units 01/26/25  0609 01/25/25  1339  01/25/25  1549 01/25/25  1045 01/25/25  0638 01/24/25  2101 01/24/25  1618 01/24/25  1107 01/24/25  0626 01/23/25  2118 01/23/25  1631 01/23/25  1051   POC GLUCOSE mg/dl 93 187* 260* 198* 204* 294* 289* 186* 188* 300* 489* 327*         Results from last 7 days   Lab Units 01/20/25  0442 01/19/25  1555 01/19/25  1249   LACTIC ACID mmol/L  --  2.5* 2.7*   PROCALCITONIN ng/ml <0.05  --  0.06       Recent Cultures (last 7 days):   Results from last 7 days   Lab Units 01/19/25  1556 01/19/25  1254 01/19/25  1249   BLOOD CULTURE   --  No Growth After 5 Days. No Growth After 5 Days.   SPUTUM CULTURE  3+ Growth of Beta Hemolytic Streptococcus Group B*  4+ Growth of Candida sp. presumptively albicans*  --   --    GRAM STAIN RESULT  2+ Epithelial cells per low power field*  2+ Polys*  2+ Gram positive cocci in pairs*  2+ Budding yeast*  --   --              Last 24 Hours Medication List:     Current Facility-Administered Medications:     acetaminophen (TYLENOL) tablet 975 mg, Q6H PRN    ALPRAZolam (XANAX) tablet 1 mg, HS PRN    aluminum-magnesium hydroxide-simethicone (MAALOX) oral suspension 30 mL, Q6H PRN    amoxicillin (AMOXIL) capsule 500 mg, Q8H DOTTY    apixaban (ELIQUIS) tablet 5 mg, BID    ascorbic acid (VITAMIN C) tablet 250 mg, Daily    atovaquone (MEPRON) oral suspension 1,500 mg, Daily    azithromycin (ZITHROMAX) tablet 250 mg, Once per day on Monday Wednesday Friday    bisacodyl (DULCOLAX) EC tablet 5 mg, HS    budesonide-formoterol (SYMBICORT) 160-4.5 mcg/act inhaler 2 puff, Q12H    Cholecalciferol (VITAMIN D3) tablet 5,000 Units, Daily    cyanocobalamin (VITAMIN B-12) tablet 1,000 mcg, Daily    famotidine (PEPCID) tablet 40 mg, HS    furosemide (LASIX) tablet 40 mg, Daily    guaiFENesin (MUCINEX) 12 hr tablet 1,200 mg, Q12H DOTTY    HYDROcodone Bit-Homatrop MBr (HYCODAN) oral syrup 5 mL, Q4H PRN    hydrocortisone 1 % cream, 4x Daily PRN    HYDROmorphone (DILAUDID) injection 1 mg, Q4H PRN    insulin glargine  (LANTUS) subcutaneous injection 16 Units 0.16 mL, QAM    insulin lispro (HumALOG/ADMELOG) 100 units/mL subcutaneous injection 1-5 Units, TID AC **AND** Fingerstick Glucose (POCT), 4x Daily AC and at bedtime    insulin lispro (HumALOG/ADMELOG) 100 units/mL subcutaneous injection 1-5 Units, HS    insulin lispro (HumALOG/ADMELOG) 100 units/mL subcutaneous injection 5 Units, Daily With Breakfast **AND** insulin lispro (HumALOG/ADMELOG) 100 units/mL subcutaneous injection 9 Units, Daily With Lunch **AND** insulin lispro (HumALOG/ADMELOG) 100 units/mL subcutaneous injection 12 Units, Daily With Dinner    ipratropium-albuterol (DUO-NEB) 0.5-2.5 mg/3 mL inhalation solution 3 mL, Q6H PRN    levalbuterol (XOPENEX) inhalation solution 1.25 mg, TID    lidocaine (LIDODERM) 5 % patch 1 patch, Daily PRN    magnesium gluconate (MAGONATE) tablet 500 mg, BID    morphine (MS CONTIN) ER tablet 15 mg, Q8H DOTTY    naloxone (NARCAN) 0.04 mg/mL syringe 0.04 mg, Q1MIN PRN    nystatin (MYCOSTATIN) oral suspension 500,000 Units, 4x Daily    ondansetron (ZOFRAN) injection 4 mg, Q6H PRN    oxybutynin (DITROPAN-XL) 24 hr tablet 15 mg, HS    oxyCODONE (ROXICODONE) IR tablet 5 mg, Q4H PRN **OR** oxyCODONE (ROXICODONE) immediate release tablet 10 mg, Q4H PRN    pancrelipase (Lip-Prot-Amyl) (CREON) delayed release capsule 48,000 Units, TID With Meals    pantoprazole (PROTONIX) EC tablet 40 mg, BID AC    polyethylene glycol (MIRALAX) packet 17 g, BID    predniSONE tablet 20 mg, Daily    pregabalin (LYRICA) capsule 150 mg, BID    sodium chloride 3 % inhalation solution 4 mL, TID    tacrolimus (PROGRAF) capsule 3 mg, BID    valACYclovir (VALTREX) tablet 500 mg, Q24H    Administrative Statements   Today, Patient Was Seen By: Joann Potts MD      **Please Note: This note may have been constructed using a voice recognition system.**

## 2025-01-26 NOTE — PLAN OF CARE
Problem: PAIN - ADULT  Goal: Verbalizes/displays adequate comfort level or baseline comfort level  Description: Interventions:  - Encourage patient to monitor pain and request assistance  - Assess pain using appropriate pain scale  - Administer analgesics based on type and severity of pain and evaluate response  - Implement non-pharmacological measures as appropriate and evaluate response  - Consider cultural and social influences on pain and pain management  - Notify physician/advanced practitioner if interventions unsuccessful or patient reports new pain  Outcome: Progressing     Problem: INFECTION - ADULT  Goal: Absence or prevention of progression during hospitalization  Description: INTERVENTIONS:  - Assess and monitor for signs and symptoms of infection  - Monitor lab/diagnostic results  - Monitor all insertion sites, i.e. indwelling lines, tubes, and drains  - Monitor endotracheal if appropriate and nasal secretions for changes in amount and color  - [place] appropriate cooling/warming therapies per order  - Administer medications as ordered  - Instruct and encourage patient and family to use good hand hygiene technique  - Identify and instruct in appropriate isolation precautions for identified infection/condition  Outcome: Progressing  Goal: Absence of fever/infection during neutropenic period  Description: INTERVENTIONS:  - Monitor WBC    Outcome: Progressing     Problem: SAFETY ADULT  Goal: Patient will remain free of falls  Description: INTERVENTIONS:  - Educate patient/family on patient safety including physical limitations  - Instruct patient to call for assistance with activity   - Consult OT/PT to assist with strengthening/mobility   - Keep Call bell within reach  - Keep bed low and locked with side rails adjusted as appropriate  - Keep care items and personal belongings within reach  - Initiate and maintain comfort rounds  - Make Fall Risk Sign visible to staff  - Offer Toileting every 3 Hours,  in advance of need  - Initiate/Maintain bed alarm  - Obtain necessary fall risk management equipment:   - Apply yellow socks and bracelet for high fall risk patients  - Consider moving patient to room near nurses station  Outcome: Progressing  Goal: Maintain or return to baseline ADL function  Description: INTERVENTIONS:  -  Assess patient's ability to carry out ADLs; assess patient's baseline for ADL function and identify physical deficits which impact ability to perform ADLs (bathing, care of mouth/teeth, toileting, grooming, dressing, etc.)  - Assess/evaluate cause of self-care deficits   - Assess range of motion  - Assess patient's mobility; develop plan if impaired  - Assess patient's need for assistive devices and provide as appropriate  - Encourage maximum independence but intervene and supervise when necessary  - Involve family in performance of ADLs  - Assess for home care needs following discharge   - Consider OT consult to assist with ADL evaluation and planning for discharge  - Provide patient education as appropriate  Outcome: Progressing  Goal: Maintains/Returns to pre admission functional level  Description: INTERVENTIONS:  - Perform AM-PAC 6 Click Basic Mobility/ Daily Activity assessment daily.  - Set and communicate daily mobility goal to care team and patient/family/caregiver.   - Collaborate with rehabilitation services on mobility goals if consulted  - Perform Range of Motion 3 times a day.  - Reposition patient every 3 hours.  - Dangle patient 3 times a day  - Stand patient 3 times a day  - Ambulate patient 3 times a day  - Out of bed to chair 3 times a day   - Out of bed for meals 3 times a day  - Out of bed for toileting  - Record patient progress and toleration of activity level   Outcome: Progressing     Problem: DISCHARGE PLANNING  Goal: Discharge to home or other facility with appropriate resources  Description: INTERVENTIONS:  - Identify barriers to discharge w/patient and caregiver  -  Arrange for needed discharge resources and transportation as appropriate  - Identify discharge learning needs (meds, wound care, etc.)  - Arrange for interpretive services to assist at discharge as needed  - Refer to Case Management Department for coordinating discharge planning if the patient needs post-hospital services based on physician/advanced practitioner order or complex needs related to functional status, cognitive ability, or social support system  Outcome: Progressing     Problem: Knowledge Deficit  Goal: Patient/family/caregiver demonstrates understanding of disease process, treatment plan, medications, and discharge instructions  Description: Complete learning assessment and assess knowledge base.  Interventions:  - Provide teaching at level of understanding  - Provide teaching via preferred learning methods  Outcome: Progressing     Problem: RESPIRATORY - ADULT  Goal: Achieves optimal ventilation and oxygenation  Description: INTERVENTIONS:  - Assess for changes in respiratory status  - Assess for changes in mentation and behavior  - Position to facilitate oxygenation and minimize respiratory effort  - Oxygen administered by appropriate delivery if ordered  - Initiate smoking cessation education as indicated  - Encourage broncho-pulmonary hygiene including cough, deep breathe, Incentive Spirometry  - Assess the need for suctioning and aspirate as needed  - Assess and instruct to report SOB or any respiratory difficulty  - Respiratory Therapy support as indicated  Outcome: Progressing

## 2025-01-26 NOTE — RESPIRATORY THERAPY NOTE
RT Protocol Note  Maximilian RAY Sourav Mann 49 y.o. female MRN: [id number]  Unit/Bed#: -01 Encounter: [id number]    Assessment    Principal Problem:    Cough  Active Problems:    Type 1 diabetes mellitus with diabetic chronic kidney disease, unspecified CKD stage ([facility])    Pancreatic insufficiency    GEOFF (acute kidney injury) ([facility])    GERD (gastroesophageal reflux disease)    Hx of lung transplant ([facility])    History of DVT (deep vein thrombosis)    SIRS (systemic inflammatory response syndrome) ([facility])    Pleuritic chest pain    Lactic acidosis    Acute respiratory insufficiency      Home Pulmonary Medications:     01/25/25 1954   Respiratory Protocol   Protocol Initiated? Yes   Protocol Selection Respiratory   Language Barrier? No   Medical & Social History Reviewed? Yes   Diagnostic Studies Reviewed? Yes   Physical Assessment Performed? Yes   Respiratory Plan Home Bronchodilator Patient pathway   Respiratory Assessment   Assessment Type During-treatment   General Appearance Awake;Alert   Respiratory Pattern Normal   Chest Assessment Chest expansion symmetrical   Bilateral Breath Sounds Diminished   Cough None   Resp Comments Will continue with current plan   O2 Device RA   Cough Description   Sputum Amount None   Additional Assessments   Pulse 95   Respirations 20   SpO2 95 %            Past Medical History:   Diagnosis Date    Abnormal uterine bleeding (AUB) 7/7/2022    ABPA (allergic bronchopulmonary aspergillosis) ([facility]) 07/08/2012    Acute kidney injury ([facility]) 09/08/2017    Acute on chronic respiratory failure ([facility]) 03/04/2017    Last Assessment & Plan:  Formatting of this note might be different from the original. Recurrent with clinical worsening with enlarging PTX, s/p multiple chest tubes, but new fevers 6/19 and worsening leukocytosis indicating infection as cause of 6/19 decompensation with profound hypoxia in setting of shunting through right lung and continued pneumothorax s/p 2nd chest tube placement.   : Right    Anaphylaxis 2022    Anemia     Asthma     Chest pain 2021    Chronic pain     Chronic respiratory failure with hypoxia (HCC) 2021    COVID-19 2022    Cystic fibrosis (HCC)     Deep vein thrombosis (HCC)     Diabetes mellitus (HCC)     Fever 2024    GERD (gastroesophageal reflux disease)     HBP (high blood pressure)     HCAP (healthcare-associated pneumonia) 2020    History of transfusion     Influenza B 2020    Kidney stone     Moderate protein-calorie malnutrition (HCC) 2022    Neutropenic fever  (HCC) 2020    Pneumonia due to Pseudomonas species ([facility]) 10/28/2010    Last Assessment & Plan:  Formatting of this note might be different from the original. Polymicrobial pna w/ pseudomonas, M. Abscessus, Cadida glabrata/albicans    PONV (postoperative nausea and vomiting)     Pseudomonas aeruginosa infection 2012    Renal calculus     Squamous cell skin cancer 2023    SCCIS- Left upper thigh    Transplant recipient     Lung- 2017    Ulcerative colitis ([facility])     Wellness examination 2013    Formatting of this note might be different from the original. Discussed with patient and acknowledgement form signed on 2013 (ALYSHA)     Social History     Socioeconomic History    Marital status: /Civil Union     Spouse name: None    Number of children: None    Years of education: 12    Highest education level: None   Occupational History    Occupation: Disabled    Tobacco Use    Smoking status: Former     Current packs/day: 0.00     Average packs/day: 0.5 packs/day for 12.0 years (6.0 ttl pk-yrs)     Types: Cigarettes     Start date: 1993     Quit date: 2005     Years since quittin.0     Passive exposure: Past    Smokeless tobacco: Never   Vaping Use    Vaping status: Never Used   Substance and Sexual Activity    Alcohol use: Yes     Alcohol/week: 2.0 standard drinks of alcohol     Types: 2 Glasses of wine per week      Comment: social    Drug use: Yes     Frequency: 2.0 times per week     Types: Marijuana     Comment: medical edible marijuana prescribed    Sexual activity: Yes     Partners: Male     Birth control/protection: None   Other Topics Concern    None   Social History Narrative    Most recent tobacco use screenin2019    Do you currently or have you served in the [employer] Forces: No    Were you activated, into active duty, as a member of the [employer] or as a Reservist: No    Occupation: disabled    Education: 12    Marital status:     Exercise level: Occasional    Diet: Specific    low sugar, high fat    General stress level: High    Alcohol intake: Occasional    Caffeine intake: Heavy    Chewing tobacco: none    Illicit drugs: none    Guns present in home: No    Seat belts used routinely: Yes    Smoke alarm in home: Yes    Advance directive: Yes     Social Drivers of Health     Financial Resource Strain: Low Risk  (11/10/2022)    Received from [facility] [facility]    Overall Financial Resource Strain (CARDIA)     Difficulty of Paying Living Expenses: Not hard at all   Food Insecurity: No Food Insecurity (2025)    Hunger Vital Sign     Worried About Running Out of Food in the Last Year: Never true     Ran Out of Food in the Last Year: Never true   Transportation Needs: No Transportation Needs (2025)    PRAPARE - Transportation     Lack of Transportation (Medical): No     Lack of Transportation (Non-Medical): No   Physical Activity: Insufficiently Active (1/15/2024)    Exercise Vital Sign     Days of Exercise per Week: 7 days     Minutes of Exercise per Session: 20 min   Stress: No Stress Concern Present (1/15/2024)    [employer] - Occupational Stress Questionnaire     Feeling of Stress : Only a little   Social Connections: Unknown (2024)    Received from [employer]     How  "often do you feel lonely or isolated from those around you? (Adult - for ages 18 years and over): Not on file   Intimate Partner Violence: Unknown (1/20/2025)    Nursing IPS     Feels Physically and Emotionally Safe: Not on file     Physically Hurt by Someone: Not on file     Humiliated or Emotionally Abused by Someone: Not on file     Physically Hurt by Someone: No     Hurt or Threatened by Someone: No   Housing Stability: Low Risk  (1/23/2025)    Housing Stability Vital Sign     Unable to Pay for Housing in the Last Year: No     Number of Times Moved in the Last Year: 0     Homeless in the Last Year: No       Subjective         Objective    Physical Exam:   Assessment Type: During-treatment  General Appearance: Awake, Alert  Respiratory Pattern: Normal  Chest Assessment: Chest expansion symmetrical  Bilateral Breath Sounds: Diminished  Cough: None  O2 Device: RA    Vitals:  Blood pressure 138/74, pulse 95, temperature 98.2 °F (36.8 °C), resp. rate 20, height 5' 2\" (1.575 m), weight 60.3 kg (132 lb 15 oz), last menstrual period 01/20/2025, SpO2 95%, not currently breastfeeding.          Imaging and other studies: Results Review Statement: No pertinent imaging studies reviewed.    O2 Device: RA     Plan    Respiratory Plan: Home Bronchodilator Patient pathway        Resp Comments: Will continue with current plan   "

## 2025-01-26 NOTE — ASSESSMENT & PLAN NOTE
Patient presentation due to sore throat, persistent cough, shortness of breath, wheezing, pleuritic chest pain starting since Friday 1/17.  Patient reports the symptoms are getting worse despite usual treatment with mucinex, nebulizers, OTC cough meds  CT chest appears similar to Nov '24 - no definitive infiltrate, but notes inflammatory tree-in-bud appearance  ID consulted for guidance due to transplant status and recurrence of symptoms  Procalcitonin normal x2 (was elevated Nov '24)  FLU/COVID/RSV/RP2 all negative   Continue respiratory protocol, supplemental O2 prn  Mucinex, albuterol, hycodan prn  1/24:   Continue hypertonic saline + albuterol nebs  Patient will consider higher steroid taper, had wanted to avoid earlier due to hyperglycemia, but given the arrest in her improvement, would be open to it if the saline nebs do not help    1/25  She would like to hold off on higher steroids, cont saline nebs hopefully times with pain medication to allow administration of saline  Increase insulin lunch/dinner/lantus  1/26  She feels better. Continue current course.

## 2025-01-26 NOTE — ASSESSMENT & PLAN NOTE
Blood Sugar Average: Last 72 hrs:(P) [id number]  Uncontrolled.  Improved control with Lantus to 16 units and increasing Humalog 2 units with lunch and dinner    Sliding scale coverage  Hypoglycemia protocol  Diabetic diet

## 2025-01-27 LAB
GLUCOSE SERPL-MCNC: 143 MG/DL (ref 65–140)
GLUCOSE SERPL-MCNC: 150 MG/DL (ref 65–140)
GLUCOSE SERPL-MCNC: 176 MG/DL (ref 65–140)
GLUCOSE SERPL-MCNC: 185 MG/DL (ref 65–140)
GLUCOSE SERPL-MCNC: 242 MG/DL (ref 65–140)

## 2025-01-27 PROCEDURE — 94760 N-INVAS EAR/PLS OXIMETRY 1: CPT

## 2025-01-27 PROCEDURE — 94668 MNPJ CHEST WALL SBSQ: CPT

## 2025-01-27 PROCEDURE — 94640 AIRWAY INHALATION TREATMENT: CPT

## 2025-01-27 PROCEDURE — 94664 DEMO&/EVAL PT USE INHALER: CPT

## 2025-01-27 PROCEDURE — 99232 SBSQ HOSP IP/OBS MODERATE 35: CPT | Performed by: INTERNAL MEDICINE

## 2025-01-27 PROCEDURE — G0545 PR INHERENT VISIT TO INPT: HCPCS | Performed by: INTERNAL MEDICINE

## 2025-01-27 PROCEDURE — 99233 SBSQ HOSP IP/OBS HIGH 50: CPT | Performed by: NURSE PRACTITIONER

## 2025-01-27 PROCEDURE — 82948 REAGENT STRIP/BLOOD GLUCOSE: CPT

## 2025-01-27 RX ADMIN — PREDNISONE 20 MG: 20 TABLET ORAL at 09:37

## 2025-01-27 RX ADMIN — FUROSEMIDE 40 MG: 40 TABLET ORAL at 09:32

## 2025-01-27 RX ADMIN — PANCRELIPASE 48000 UNITS: 120000; 24000; 76000 CAPSULE, DELAYED RELEASE PELLETS ORAL at 16:27

## 2025-01-27 RX ADMIN — MORPHINE SULFATE 15 MG: 15 TABLET, EXTENDED RELEASE ORAL at 06:20

## 2025-01-27 RX ADMIN — Medication 500 MG: at 09:35

## 2025-01-27 RX ADMIN — MORPHINE SULFATE 15 MG: 15 TABLET, EXTENDED RELEASE ORAL at 21:07

## 2025-01-27 RX ADMIN — NYSTATIN 500000 UNITS: 100000 SUSPENSION ORAL at 13:03

## 2025-01-27 RX ADMIN — NYSTATIN 500000 UNITS: 100000 SUSPENSION ORAL at 17:39

## 2025-01-27 RX ADMIN — Medication 5000 UNITS: at 10:02

## 2025-01-27 RX ADMIN — AMOXICILLIN 500 MG: 250 CAPSULE ORAL at 06:20

## 2025-01-27 RX ADMIN — TACROLIMUS 3 MG: 1 CAPSULE ORAL at 17:39

## 2025-01-27 RX ADMIN — LEVALBUTEROL HYDROCHLORIDE 1.25 MG: 1.25 SOLUTION RESPIRATORY (INHALATION) at 07:21

## 2025-01-27 RX ADMIN — GUAIFENESIN 1200 MG: 600 TABLET ORAL at 09:33

## 2025-01-27 RX ADMIN — BUDESONIDE AND FORMOTEROL FUMARATE DIHYDRATE 2 PUFF: 160; 4.5 AEROSOL RESPIRATORY (INHALATION) at 21:39

## 2025-01-27 RX ADMIN — INSULIN GLARGINE 16 UNITS: 100 INJECTION, SOLUTION SUBCUTANEOUS at 09:33

## 2025-01-27 RX ADMIN — TACROLIMUS 3 MG: 1 CAPSULE ORAL at 09:38

## 2025-01-27 RX ADMIN — SODIUM CHLORIDE SOLN NEBU 3% 4 ML: 3 NEBU SOLN at 07:21

## 2025-01-27 RX ADMIN — INSULIN LISPRO 1 UNITS: 100 INJECTION, SOLUTION INTRAVENOUS; SUBCUTANEOUS at 21:53

## 2025-01-27 RX ADMIN — HYDROMORPHONE HYDROCHLORIDE 1.5 MG: 2 INJECTION INTRAMUSCULAR; INTRAVENOUS; SUBCUTANEOUS at 16:25

## 2025-01-27 RX ADMIN — PREDNISONE 30 MG: 20 TABLET ORAL at 16:25

## 2025-01-27 RX ADMIN — PANTOPRAZOLE SODIUM 40 MG: 40 TABLET, DELAYED RELEASE ORAL at 06:20

## 2025-01-27 RX ADMIN — HYDROCORTISONE: 10 CREAM TOPICAL at 22:00

## 2025-01-27 RX ADMIN — NYSTATIN 500000 UNITS: 100000 SUSPENSION ORAL at 09:49

## 2025-01-27 RX ADMIN — PREGABALIN 150 MG: 75 CAPSULE ORAL at 09:37

## 2025-01-27 RX ADMIN — BUDESONIDE AND FORMOTEROL FUMARATE DIHYDRATE 2 PUFF: 160; 4.5 AEROSOL RESPIRATORY (INHALATION) at 09:48

## 2025-01-27 RX ADMIN — POLYETHYLENE GLYCOL 3350 17 G: 17 POWDER, FOR SOLUTION ORAL at 17:39

## 2025-01-27 RX ADMIN — INSULIN LISPRO 2 UNITS: 100 INJECTION, SOLUTION INTRAVENOUS; SUBCUTANEOUS at 13:01

## 2025-01-27 RX ADMIN — INSULIN LISPRO 6 UNITS: 100 INJECTION, SOLUTION INTRAVENOUS; SUBCUTANEOUS at 13:02

## 2025-01-27 RX ADMIN — CYANOCOBALAMIN TAB 500 MCG 1000 MCG: 500 TAB at 10:04

## 2025-01-27 RX ADMIN — PREGABALIN 150 MG: 75 CAPSULE ORAL at 17:39

## 2025-01-27 RX ADMIN — HYDROMORPHONE HYDROCHLORIDE 1.5 MG: 2 INJECTION INTRAMUSCULAR; INTRAVENOUS; SUBCUTANEOUS at 22:33

## 2025-01-27 RX ADMIN — PANTOPRAZOLE SODIUM 40 MG: 40 TABLET, DELAYED RELEASE ORAL at 16:25

## 2025-01-27 RX ADMIN — DIPHENHYDRAMINE HYDROCHLORIDE 25 MG: 25 TABLET ORAL at 23:15

## 2025-01-27 RX ADMIN — APIXABAN 5 MG: 5 TABLET, FILM COATED ORAL at 09:27

## 2025-01-27 RX ADMIN — OXYCODONE HYDROCHLORIDE 10 MG: 10 TABLET ORAL at 18:11

## 2025-01-27 RX ADMIN — INSULIN LISPRO 1 UNITS: 100 INJECTION, SOLUTION INTRAVENOUS; SUBCUTANEOUS at 16:27

## 2025-01-27 RX ADMIN — VALACYCLOVIR HYDROCHLORIDE 500 MG: 500 TABLET, FILM COATED ORAL at 09:38

## 2025-01-27 RX ADMIN — NYSTATIN 500000 UNITS: 100000 SUSPENSION ORAL at 21:12

## 2025-01-27 RX ADMIN — APIXABAN 5 MG: 5 TABLET, FILM COATED ORAL at 17:39

## 2025-01-27 RX ADMIN — GUAIFENESIN 1200 MG: 600 TABLET ORAL at 21:06

## 2025-01-27 RX ADMIN — MORPHINE SULFATE 15 MG: 15 TABLET, EXTENDED RELEASE ORAL at 13:03

## 2025-01-27 RX ADMIN — BISACODYL 5 MG: 5 TABLET, COATED ORAL at 21:07

## 2025-01-27 RX ADMIN — PANCRELIPASE 48000 UNITS: 120000; 24000; 76000 CAPSULE, DELAYED RELEASE PELLETS ORAL at 07:41

## 2025-01-27 RX ADMIN — ATOVAQUONE 1500 MG: 750 SUSPENSION ORAL at 09:47

## 2025-01-27 RX ADMIN — OXYBUTYNIN CHLORIDE 15 MG: 5 TABLET, EXTENDED RELEASE ORAL at 21:06

## 2025-01-27 RX ADMIN — OXYCODONE HYDROCHLORIDE 10 MG: 10 TABLET ORAL at 01:30

## 2025-01-27 RX ADMIN — INSULIN LISPRO 6 UNITS: 100 INJECTION, SOLUTION INTRAVENOUS; SUBCUTANEOUS at 16:28

## 2025-01-27 RX ADMIN — FAMOTIDINE 40 MG: 20 TABLET, FILM COATED ORAL at 21:07

## 2025-01-27 RX ADMIN — Medication 500 MG: at 17:39

## 2025-01-27 RX ADMIN — HYDROMORPHONE HYDROCHLORIDE 1.5 MG: 2 INJECTION INTRAMUSCULAR; INTRAVENOUS; SUBCUTANEOUS at 10:21

## 2025-01-27 RX ADMIN — POLYETHYLENE GLYCOL 3350 17 G: 17 POWDER, FOR SOLUTION ORAL at 09:36

## 2025-01-27 RX ADMIN — HYDROMORPHONE HYDROCHLORIDE 1.5 MG: 2 INJECTION INTRAMUSCULAR; INTRAVENOUS; SUBCUTANEOUS at 04:05

## 2025-01-27 RX ADMIN — PANCRELIPASE 48000 UNITS: 120000; 24000; 76000 CAPSULE, DELAYED RELEASE PELLETS ORAL at 13:03

## 2025-01-27 RX ADMIN — OXYCODONE HYDROCHLORIDE AND ACETAMINOPHEN 250 MG: 500 TABLET ORAL at 09:27

## 2025-01-27 RX ADMIN — AZITHROMYCIN 250 MG: 250 TABLET, FILM COATED ORAL at 09:29

## 2025-01-27 NOTE — RESPIRATORY THERAPY NOTE
RT Protocol Note  Maximilian RAY Sourav Mann 49 y.o. female MRN: [id number]  Unit/Bed#: -01 Encounter: [id number]    Assessment    Principal Problem:    Cough  Active Problems:    Type 1 diabetes mellitus with diabetic chronic kidney disease, unspecified CKD stage (HCC)    Pancreatic insufficiency    GEOFF (acute kidney injury) (HCC)    GERD (gastroesophageal reflux disease)    Hx of lung transplant (HCC)    History of DVT (deep vein thrombosis)    SIRS (systemic inflammatory response syndrome) ([facility])    Pleuritic chest pain    Lactic acidosis    Acute respiratory insufficiency      Home Pulmonary Medications:     01/27/25 1700   Respiratory Protocol   Protocol Initiated? No   Protocol Selection Respiratory   Language Barrier? No   Medical & Social History Reviewed? Yes   Diagnostic Studies Reviewed? Yes   Physical Assessment Performed? Yes   Respiratory Plan Home Bronchodilator Patient pathway   Respiratory Assessment   Resp Comments continue as ordered            Past Medical History:   Diagnosis Date    Abnormal uterine bleeding (AUB) 7/7/2022    ABPA (allergic bronchopulmonary aspergillosis) ([facility]) 07/08/2012    Acute kidney injury (HCC) 09/08/2017    Acute on chronic respiratory failure ([facility]) 03/04/2017    Last Assessment & Plan:  Formatting of this note might be different from the original. Recurrent with clinical worsening with enlarging PTX, s/p multiple chest tubes, but new fevers 6/19 and worsening leukocytosis indicating infection as cause of 6/19 decompensation with profound hypoxia in setting of shunting through right lung and continued pneumothorax s/p 2nd chest tube placement.  6/27: Right    Anaphylaxis 06/06/2022    Anemia     Asthma     Chest pain 09/21/2021    Chronic pain     Chronic respiratory failure with hypoxia ([facility]) 09/07/2021    COVID-19 06/03/2022    Cystic fibrosis (HCC)     Deep vein thrombosis (HCC)     Diabetes mellitus (HCC)     Fever 09/22/2024    GERD (gastroesophageal reflux  disease)     HBP (high blood pressure)     HCAP (healthcare-associated pneumonia) 2020    History of transfusion     Influenza B 2020    Kidney stone     Moderate protein-calorie malnutrition (HCC) 2022    Neutropenic fever  ([facility]) 2020    Pneumonia due to Pseudomonas species ([facility]) 10/28/2010    Last Assessment & Plan:  Formatting of this note might be different from the original. Polymicrobial pna w/ pseudomonas, M. Abscessus, Cadida glabrata/albicans    PONV (postoperative nausea and vomiting)     Pseudomonas aeruginosa infection 2012    Renal calculus     Squamous cell skin cancer 2023    SCCIS- Left upper thigh    Transplant recipient     Lung- ,     Ulcerative colitis ([facility])     Wellness examination 2013    Formatting of this note might be different from the original. Discussed with patient and acknowledgement form signed on 2013 (ALYSHA)     Social History     Socioeconomic History    Marital status: /Civil Union     Spouse name: None    Number of children: None    Years of education: 12    Highest education level: None   Occupational History    Occupation: Disabled    Tobacco Use    Smoking status: Former     Current packs/day: 0.00     Average packs/day: 0.5 packs/day for 12.0 years (6.0 ttl pk-yrs)     Types: Cigarettes     Start date: 1993     Quit date: 2005     Years since quittin.0     Passive exposure: Past    Smokeless tobacco: Never   Vaping Use    Vaping status: Never Used   Substance and Sexual Activity    Alcohol use: Yes     Alcohol/week: 2.0 standard drinks of alcohol     Types: 2 Glasses of wine per week     Comment: social    Drug use: Yes     Frequency: 2.0 times per week     Types: Marijuana     Comment: medical edible marijuana prescribed    Sexual activity: Yes     Partners: Male     Birth control/protection: None   Other Topics Concern    None   Social History Narrative    Most recent tobacco use screenin2019     Do you currently or have you served in the [employer]: No    Were you activated, into active duty, as a member of the [employer] or as a Reservist: No    Occupation: disabled    Education: 12    Marital status:     Exercise level: Occasional    Diet: Specific    low sugar, high fat    General stress level: High    Alcohol intake: Occasional    Caffeine intake: Heavy    Chewing tobacco: none    Illicit drugs: none    Guns present in home: No    Seat belts used routinely: Yes    Smoke alarm in home: Yes    Advance directive: Yes     Social Drivers of Health     Financial Resource Strain: Low Risk  (11/10/2022)    [facility]    Overall Financial Resource Strain (CARDIA)     Difficulty of Paying Living Expenses: Not hard at all   Food Insecurity: No Food Insecurity (1/23/2025)    Hunger Vital Sign     Worried About Running Out of Food in the Last Year: Never true     Ran Out of Food in the Last Year: Never true   Transportation Needs: No Transportation Needs (1/23/2025)    PRAPARE - Transportation     Lack of Transportation (Medical): No     Lack of Transportation (Non-Medical): No   Physical Activity: Insufficiently Active (1/15/2024)    Exercise Vital Sign     Days of Exercise per Week: 7 days     Minutes of Exercise per Session: 20 min   Stress: No Stress Concern Present (1/15/2024)    [employer] [place] [employer] - Occupational Stress Questionnaire     Feeling of Stress : Only a little   Social Connections: Unknown (6/18/2024)    Received from [employer]    Social [employer]     How often do you feel lonely or isolated from those around you? (Adult - for ages 18 years and over): Not on file   Intimate Partner Violence: Unknown (1/20/2025)    Nursing IPS     Feels Physically and Emotionally Safe: Not on file     Physically Hurt by Someone: Not on file     Humiliated or Emotionally Abused by Someone: Not  "on file     Physically Hurt by Someone: No     Hurt or Threatened by Someone: No   Housing Stability: Low Risk  (1/23/2025)    Housing Stability Vital Sign     Unable to Pay for Housing in the Last Year: No     Number of Times Moved in the Last Year: 0     Homeless in the Last Year: No       Subjective         Objective    Physical Exam:   Assessment Type: Pre-treatment  General Appearance: Alert, Awake  Respiratory Pattern: Normal  Chest Assessment: Chest expansion symmetrical  Bilateral Breath Sounds: Diminished  Cough: None  O2 Device: ra    Vitals:  Blood pressure 127/74, pulse 95, temperature 98.1 °F (36.7 °C), temperature source Oral, resp. rate 16, height 5' 2\" (1.575 m), weight 60.3 kg (132 lb 15 oz), last menstrual period 01/20/2025, SpO2 94%, not currently breastfeeding.          Imaging and other studies:     O2 Device: ra     Plan    Respiratory Plan: Home Bronchodilator Patient pathway        Resp Comments: continue as ordered   "

## 2025-01-27 NOTE — ASSESSMENT & PLAN NOTE
Mild tachycardia, tachypnea.  No fevers, leukocytosis.  Normal procalcitonin x 2.  Consider viral process based on patient's constellation of symptoms.  RP 2 is negative.  No new findings on chest CT, as below.  Seems to be clinically improving.  Admission blood cultures are negative.   Antibiotic plan as below   Follow temperatures and hemodynamics   Follow symptomatology   Recheck CBC in AM to assess potential infection while inpatient

## 2025-01-27 NOTE — ASSESSMENT & PLAN NOTE
Patient previously underwent double lung transplant in 2013. She required retransplant of the left lung in 2017. Course was then complicated by need for right-sided pneumonectomy in 2022 given chronic progressive infections. She remains on regimen with tacrolimus and prednisone. Prophylactic medications and dosing reviewed.    Continue long-term atovaquone, Valtrex, azithromycin prophylaxis   Close follow-up with transplant center

## 2025-01-27 NOTE — ASSESSMENT & PLAN NOTE
Patient presented with progressively worsening respiratory symptoms.  No fevers, leukocytosis, or procalcitonin elevation although we may not see such abnormalities given patient's immunosuppressed state.  Consider possibility of microaspiration in the setting of significantly dilated esophagus.  Less likely new pulmonary infection as current CT chest remains stable in comparison to prior CT from 2 months ago.  Patient has known history of chronic airway colonization including GBS, MRSA.  Sputum culture was sent but appears to be a poor sample.  Final sputum culture again with growth of GBS and Candida albicans consistent with chronic colonization.  Will plan to treat for short course given immunosuppressed state.  Symptoms seem to be improving.  Patient has received 9 days of antibiotic.   Hold additional amoxicillin as patient has received sufficient course of antibiotic.   Follow respiratory symptoms and O2 requirements

## 2025-01-27 NOTE — ASSESSMENT & PLAN NOTE
Secondary to cystic fibrosis; Followed by [facility], s/p bilat lung transplant 2013, subsequent rejection and severe bronchiolitis obliterans   s/p second left lung transplant 2017 and right pneumonectomy  Has had recurrent admissions for pneumonia and Pseudomonas/MSSA   Patient currently taking tacrolimus 3 mg twice a day and prednisone 20 mg daily.    It was recommended by her transplant team high dose steroids however the patient was reluctant.    Patient takes for prophylaxis azithromycin 250 mg 3 times a week [facility], atovaquone 1500 milligrams daily for PJP prophylaxis and Valtrex 500 mg daily

## 2025-01-27 NOTE — ASSESSMENT & PLAN NOTE
Blood Sugar Average: Last 72 hrs:(P) [id number]  Uncontrolled evident by A1c 7.8 from 12/16/2024.  Improved control with Lantus to 16 units and increasing Humalog 2 units with lunch and dinner    Sliding scale coverage  Hypoglycemia protocol  Diabetic diet

## 2025-01-27 NOTE — ASSESSMENT & PLAN NOTE
Patient presentation due to sore throat, persistent cough, shortness of breath, wheezing, pleuritic chest pain starting since Friday 1/17.  Patient reports the symptoms are getting worse despite usual treatment with mucinex, nebulizers, OTC cough meds  CT chest appears similar to Nov '24 - no definitive infiltrate, but notes inflammatory tree-in-bud appearance  ID consulted for guidance due to transplant status and recurrence of symptoms  Procalcitonin normal x2 (was elevated Nov '24)  FLU/COVID/RSV/RP2 all negative   Continue respiratory protocol, supplemental O2 prn  Mucinex, albuterol, hycodan prn  1/24:   Continue hypertonic saline + albuterol nebs  Patient will consider higher steroid taper, had wanted to avoid earlier due to hyperglycemia, but given the arrest in her improvement, would be open to it if the saline nebs do not help    1/25  She would like to hold off on higher steroids, cont saline nebs hopefully times with pain medication to allow administration of saline  Increase insulin lunch/dinner/lantus  1/26-1/27  Currently on oral prednisone 20 mg daily patient reports feeling stable but wants to get back to her coordinator appointment on Thursday down at [facility].  Called to the transplant coordinator nurse practitioner awaiting a callback as patient is now willing to do a higher steroid taper.

## 2025-01-27 NOTE — PROGRESS NOTES
Progress Note - Infectious Disease   Name: Maximilian Mann 49 y.o. female I MRN: [id number]  Unit/Bed#: -01 I Date of Admission: 1/19/2025   Date of Service: 1/27/2025 I Hospital Day: 8    Assessment & Plan  SIRS (systemic inflammatory response syndrome) ([facility])  Mild tachycardia, tachypnea.  No fevers, leukocytosis.  Normal procalcitonin x 2.  Consider viral process based on patient's constellation of symptoms.  RP 2 is negative.  No new findings on chest CT, as below.  Seems to be clinically improving.  Admission blood cultures are negative.   Antibiotic plan as below   Follow temperatures and hemodynamics   Follow symptomatology   Recheck CBC in AM to assess potential infection while inpatient  Acute respiratory insufficiency  Patient presented with progressively worsening respiratory symptoms.  No fevers, leukocytosis, or procalcitonin elevation although we may not see such abnormalities given patient's immunosuppressed state.  Consider possibility of microaspiration in the setting of significantly dilated esophagus.  Less likely new pulmonary infection as current CT chest remains stable in comparison to prior CT from 2 months ago.  Patient has known history of chronic airway colonization including GBS, MRSA.  Sputum culture was sent but appears to be a poor sample.  Final sputum culture again with growth of GBS and Candida albicans consistent with chronic colonization.  Will plan to treat for short course given immunosuppressed state.  Symptoms seem to be improving.  Patient has received 9 days of antibiotic.   Hold additional amoxicillin as patient has received sufficient course of antibiotic.   Follow respiratory symptoms and O2 requirements  GEOFF (acute kidney injury) ([facility])  Consider secondary to recent poor oral intake as improved with IVF's.   Volume management per primary service   Check BMP in AM  Hx of lung transplant (HCC)  Patient previously underwent double lung transplant in 2013. She  required retransplant of the left lung in 2017. Course was then complicated by need for right-sided pneumonectomy in 2022 given chronic progressive infections. She remains on regimen with tacrolimus and prednisone. Prophylactic medications and dosing reviewed.    Continue long-term atovaquone, Valtrex, azithromycin prophylaxis   Close follow-up with transplant center  Type 1 diabetes mellitus with diabetic chronic kidney disease, unspecified CKD stage (HCC)  Lab Results   Component Value Date    HGBA1C 7.8 (H) 12/16/2024       Recent Labs     01/26/25  2156 01/27/25  0628 01/27/25  0928 01/27/25  1100   POCGLU 136 143* 150* 242*     With recent hemoglobin A1c of 7.8.  Potential risk factor for infection.  Blood sugar control per primary service.  Pancreatic insufficiency  On Creon chronically.  GERD (gastroesophageal reflux disease)  Patient has gastroparesis with poorly controlled GERD.  She did have previous fundoplication in 2023.  CT again with dilated esophagus.  Of note, doxycycline has previously exacerbated the symptoms.   Ongoing management per primary service        Antibiotics:  Antibiotic 9  Amoxicillin  Atovaquone/azithromycin/valacyclovir prophylaxis    I discussed above plan with Cira from primary service who agrees with discontinuation of amoxicillin.      Subjective   Had episode of low blood sugar overnight.  Remains afebrile.  No hypoxia.    Objective :  Temp:  [98.2 °F (36.8 °C)-98.6 °F (37 °C)] 98.6 °F (37 °C)  HR:  [95] 95  BP: (147-149)/() 149/104  Resp:  [16] 16  SpO2:  [94 %-95 %] 94 %  O2 Device: None (Room air)    General:  No acute distress  HEENT atraumatic normocephalic  Neck trachea midline  Psychiatric:  Awake and alert  Pulmonary:  Normal respiratory excursion without accessory muscle use  Abdomen: Nondistended with no rigidity or guarding  Extremities:  No edema  Skin:  No rashes  Neuro moves all extremities spontaneously      Lab Results: I have reviewed the following  results:  Results from last 7 days   Lab Units 01/24/25  0552   WBC Thousand/uL 6.13   HEMOGLOBIN g/dL 8.7*   PLATELETS Thousands/uL 157     Results from last 7 days   Lab Units 01/26/25  0541 01/25/25  0600 01/24/25  0552   SODIUM mmol/L 138 137 136   POTASSIUM mmol/L 4.2 4.5 4.2   CHLORIDE mmol/L 101 100 96   CO2 mmol/L 32 32 36*   BUN mg/dL 49* 40* 44*   CREATININE mg/dL 1.14 1.13 1.34*   EGFR ml/min/1.73sq m 57 58 47   CALCIUM mg/dL 8.4 8.2* 8.5

## 2025-01-27 NOTE — QUICK NOTE
Notified by RN patient blood glucose 34 at 2106.  Patient was given apple juice, BG normalized to 136 1 hour later.

## 2025-01-27 NOTE — PROGRESS NOTES
Progress Note - Hospitalist   Name: Maximilian Mann 49 y.o. female I MRN: [id number]  Unit/Bed#: -01 I Date of Admission: 1/19/2025   Date of Service: 1/27/2025 I Hospital Day: 8    Assessment & Plan  Hx of lung transplant (HCC)  Secondary to cystic fibrosis; Followed by [facility], s/p bilat lung transplant 2013, subsequent rejection and severe bronchiolitis obliterans   s/p second left lung transplant 2017 and right pneumonectomy  Has had recurrent admissions for pneumonia and Pseudomonas/MSSA   Patient currently taking tacrolimus 3 mg twice a day and prednisone 20 mg daily.    It was recommended by her transplant team high dose steroids however the patient was reluctant.    Patient takes for prophylaxis azithromycin 250 mg 3 times a week MWF, atovaquone 1500 milligrams daily for PJP prophylaxis and Valtrex 500 mg daily  Cough  Patient presentation due to sore throat, persistent cough, shortness of breath, wheezing, pleuritic chest pain starting since Friday 1/17.  Patient reports the symptoms are getting worse despite usual treatment with mucinex, nebulizers, OTC cough meds  CT chest appears similar to Nov '24 - no definitive infiltrate, but notes inflammatory tree-in-bud appearance  ID consulted for guidance due to transplant status and recurrence of symptoms  Procalcitonin normal x2 (was elevated Nov '24)  FLU/COVID/RSV/RP2 all negative   Continue respiratory protocol, supplemental O2 prn  Mucinex, albuterol, hycodan prn  1/24:   Continue hypertonic saline + albuterol nebs  Patient will consider higher steroid taper, had wanted to avoid earlier due to hyperglycemia, but given the arrest in her improvement, would be open to it if the saline nebs do not help    1/25  She would like to hold off on higher steroids, cont saline nebs hopefully times with pain medication to allow administration of saline  Increase insulin lunch/dinner/lantus  1/26-1/27  Currently on oral prednisone 20 mg daily patient  reports feeling stable but wants to get back to her coordinator appointment on Thursday down at [facility].  Called to the transplant coordinator nurse practitioner awaiting a callback as patient is now willing to do a higher steroid taper.  SIRS (systemic inflammatory response syndrome) ([facility])  Tachycardia, tachypnea. She remains afebrile and without leukocytosis or elevated procalcitonin. RP2 negative.  ID consulted, no clear new infectious etiology  Sputum GBS and candida, appears to be colonized   Ceftriaxone x3 days, switched to amoxicillin through 1/25  Nystatin swish/swallow per patient request    Type 1 diabetes mellitus with diabetic chronic kidney disease, unspecified CKD stage ([facility])  Blood Sugar Average: Last 72 hrs:(P) [id number]  Uncontrolled evident by A1c 7.8 from 12/16/2024.  Improved control with Lantus to 16 units and increasing Humalog 2 units with lunch and dinner    Sliding scale coverage  Hypoglycemia protocol  Diabetic diet  Pancreatic insufficiency  Continue Creon  GEOFF (acute kidney injury) ([facility])  Cr 1.3 on admission, baseline Cr 0.8-1.1  Suspected prerenal due to possible acute viral illness?   Cr improved with IV fluids, will hold on further due to patient swelling/fluid retention  Given lasix 1/23 but held due to creatinine bump  Lasix resumed 1/25, trend BMP which continues to be stable   GERD (gastroesophageal reflux disease)  History of severe GERD status post fundoplication in 2023   Patient currently taking dexlansoprazole 60 mg daily and famotidine 40 mg daily at bedtime  Continue formulary sub PPI + famotidine   History of DVT (deep vein thrombosis)  Continue with Eliquis 5 mg twice a day.  Pleuritic chest pain  Pain uncontrolled, now better with long-acting regimen. Continue:  Morphine ER 15 mg TID  Oxycodone 5 mg q4h prn moderate pain  Oxycodone 10 mg q4h prn severe pain  Dilaudid 1 mg q4h prn breakthrough pain  Tylenol prn  Hycodan prn   Lactic acidosis  Elevated lactic  acid 2.7 and improved with IV fluids to 2.5, suspect type B lactic acid elevation secondary to albuterol  No evidence of end-organ damage (barely meets GEOFF criteria, no definitive infection)   Acute respiratory insufficiency      VTE Pharmacologic Prophylaxis: VTE Score: 3 Moderate Risk (Score 3-4) - Pharmacological DVT Prophylaxis Ordered: apixaban (Eliquis).    Mobility:   Basic Mobility Inpatient Raw Score: 24  JH-HLM Goal: 8: Walk 250 feet or more  JH-HLM Achieved: 7: Walk 25 feet or more  JH-HLM Goal NOT achieved. Continue with multidisciplinary rounding and encourage appropriate mobility to improve upon JH-HLM goals.    Patient Centered Rounds: I performed bedside rounds with nursing staff today.   Discussions with Specialists or Other Care Team Provider: Transplant coordinator message sent down to [facility]    Education and Discussions with Family / Patient: Patient declined call to .     Current Length of Stay: 8 day(s)  Current Patient Status: Inpatient   Certification Statement: The patient will continue to require additional inpatient hospital stay due to ongoing treatment in setting of chronic bronchitis ongoing respiratory symptoms   Discharge Plan: Anticipate discharge in 24-48 hrs to home.    Code Status: Level 1 - Full Code    Subjective   Patient reports she still has the cough overall understands she is stable but not improved she is now open to considering a steroid taper that you pen would recommend.  Patient would like to discharge home A[place] or Wednesday as she has an appointment with her transplant coordinator on Thursday.    Objective :  Temp:  [98.2 °F (36.8 °C)-98.6 °F (37 °C)] 98.6 °F (37 °C)  HR:  [95] 95  BP: (147-149)/() 149/104  Resp:  [16] 16  SpO2:  [94 %-95 %] 94 %  O2 Device: None (Room air)    Body mass index is 24.31 kg/m².     Input and Output Summary (last 24 hours):   No intake or output data in the 24 hours ending 01/27/25 1411    Physical  Exam  Constitutional:       Appearance: She is well-developed.   HENT:      Head: Normocephalic and atraumatic.   Eyes:      Conjunctiva/sclera: Conjunctivae normal.   Cardiovascular:      Rate and Rhythm: Normal rate and regular rhythm.      Heart sounds: Normal heart sounds.   Pulmonary:      Effort: Pulmonary effort is normal.      Breath sounds: Normal breath sounds.      Comments: Absent left-sided breath sounds noted referred from right  Abdominal:      General: Bowel sounds are normal.      Palpations: Abdomen is soft.   Musculoskeletal:         General: Normal range of motion.      Cervical back: Normal range of motion.   Skin:     General: Skin is warm and dry.   Neurological:      Mental Status: She is alert and oriented to person, place, and time.   Psychiatric:         Behavior: Behavior normal.           Lines/Drains:  Lines/Drains/Airways       Active Status       Name Placement date Placement time Site Days    Port A Cath 11/25/22 Right Chest 11/25/22  0953  Chest  794                    Central Line:  Goal for removal: N/A - Chronic PICC               Lab Results: I have reviewed the following results:   Results from last 7 days   Lab Units 01/24/25  0552   WBC Thousand/uL 6.13   HEMOGLOBIN g/dL 8.7*   HEMATOCRIT % 32.8*   PLATELETS Thousands/uL 157   SEGS PCT % 59   LYMPHO PCT % 30   MONO PCT % 10   EOS PCT % 1     Results from last 7 days   Lab Units 01/26/25  0541   SODIUM mmol/L 138   POTASSIUM mmol/L 4.2   CHLORIDE mmol/L 101   CO2 mmol/L 32   BUN mg/dL 49*   CREATININE mg/dL 1.14   ANION GAP mmol/L 5   CALCIUM mg/dL 8.4   GLUCOSE RANDOM mg/dL 94         Results from last 7 days   Lab Units 01/27/25  1100 01/27/25  0928 01/27/25  0628 01/26/25  2156 01/26/25  2121 01/26/25  2106 01/26/25  1609 01/26/25  1121 01/26/25  0609 01/25/25  2053 01/25/25  1549 01/25/25  1045   POC GLUCOSE mg/dl 242* 150* 143* 136 54* 34* 178* 211* 93 187* 260* 198*               Recent Cultures (last 7 days):          Imaging Results Review: No pertinent imaging studies reviewed.  Other Study Results Review: No additional pertinent studies reviewed.    Last 24 Hours Medication List:     Current Facility-Administered Medications:     acetaminophen (TYLENOL) tablet 975 mg, Q6H PRN    ALPRAZolam (XANAX) tablet 1 mg, HS PRN    aluminum-magnesium hydroxide-simethicone (MAALOX) oral suspension 30 mL, Q6H PRN    apixaban (ELIQUIS) tablet 5 mg, BID    ascorbic acid (VITAMIN C) tablet 250 mg, Daily    atovaquone (MEPRON) oral suspension 1,500 mg, Daily    azithromycin (ZITHROMAX) tablet 250 mg, Once per day on Monday Wednesday Friday    bisacodyl (DULCOLAX) EC tablet 5 mg, HS    budesonide-formoterol (SYMBICORT) 160-4.5 mcg/act inhaler 2 puff, Q12H    Cholecalciferol (VITAMIN D3) tablet 5,000 Units, Daily    cyanocobalamin (VITAMIN B-12) tablet 1,000 mcg, Daily    diphenhydrAMINE (BENADRYL) tablet 25 mg, Q6H PRN    famotidine (PEPCID) tablet 40 mg, HS    furosemide (LASIX) tablet 40 mg, Daily    guaiFENesin (MUCINEX) 12 hr tablet 1,200 mg, Q12H DOTTY    HYDROcodone Bit-Homatrop MBr (HYCODAN) oral syrup 5 mL, Q4H PRN    hydrocortisone 1 % cream, 4x Daily PRN    HYDROmorphone (DILAUDID) injection 1.5 mg, Q6H PRN    insulin glargine (LANTUS) subcutaneous injection 16 Units 0.16 mL, QAM    insulin lispro (HumALOG/ADMELOG) 100 units/mL subcutaneous injection 1-5 Units, TID AC **AND** Fingerstick Glucose (POCT), 4x Daily AC and at bedtime    insulin lispro (HumALOG/ADMELOG) 100 units/mL subcutaneous injection 1-5 Units, HS    insulin lispro (HumALOG/ADMELOG) 100 units/mL subcutaneous injection 5 Units, Daily With Breakfast **AND** insulin lispro (HumALOG/ADMELOG) 100 units/mL subcutaneous injection 9 Units, Daily With Lunch **AND** insulin lispro (HumALOG/ADMELOG) 100 units/mL subcutaneous injection 12 Units, Daily With Dinner    ipratropium-albuterol (DUO-NEB) 0.5-2.5 mg/3 mL inhalation solution 3 mL, Q6H PRN    lidocaine (LIDODERM) 5 %  patch 1 patch, Daily PRN    magnesium gluconate (MAGONATE) tablet 500 mg, BID    morphine (MS CONTIN) ER tablet 15 mg, Q8H DOTTY    naloxone (NARCAN) 0.04 mg/mL syringe 0.04 mg, Q1MIN PRN    nystatin (MYCOSTATIN) oral suspension 500,000 Units, 4x Daily    ondansetron (ZOFRAN) injection 4 mg, Q6H PRN    oxybutynin (DITROPAN-XL) 24 hr tablet 15 mg, HS    oxyCODONE (ROXICODONE) IR tablet 5 mg, Q4H PRN **OR** oxyCODONE (ROXICODONE) immediate release tablet 10 mg, Q4H PRN    pancrelipase (Lip-Prot-Amyl) (CREON) delayed release capsule 48,000 Units, TID With Meals    pantoprazole (PROTONIX) EC tablet 40 mg, BID AC    polyethylene glycol (MIRALAX) packet 17 g, BID    predniSONE tablet 20 mg, Daily    pregabalin (LYRICA) capsule 150 mg, BID    tacrolimus (PROGRAF) capsule 3 mg, BID    valACYclovir (VALTREX) tablet 500 mg, Q24H    Administrative Statements   Today, Patient Was Seen By: WAYNE Ball  I have spent a total time of 55 minutes in caring for this patient on the day of the visit/encounter including Risks and benefits of tx options, Impressions, Counseling / Coordination of care, Reviewing / ordering tests, medicine, procedures  , Obtaining or reviewing history  , and Communicating with other healthcare professionals .    **Please Note: This note may have been constructed using a voice recognition system.**

## 2025-01-27 NOTE — ASSESSMENT & PLAN NOTE
Lab Results   Component Value Date    HGBA1C 7.8 (H) 12/16/2024       Recent Labs     01/26/25  2156 01/27/25  0628 01/27/25  0928 01/27/25  1100   POCGLU 136 143* 150* 242*     With recent hemoglobin A1c of 7.8.  Potential risk factor for infection.  Blood sugar control per primary service.

## 2025-01-27 NOTE — PLAN OF CARE

## 2025-01-28 ENCOUNTER — PATIENT MESSAGE (OUTPATIENT)
Dept: ENDOCRINOLOGY | Facility: CLINIC | Age: 50
End: 2025-01-28

## 2025-01-28 VITALS
DIASTOLIC BLOOD PRESSURE: 94 MMHG | HEIGHT: 62 IN | OXYGEN SATURATION: 94 % | HEART RATE: 95 BPM | WEIGHT: 132.94 LBS | BODY MASS INDEX: 24.46 KG/M2 | TEMPERATURE: 97.9 F | SYSTOLIC BLOOD PRESSURE: 128 MMHG | RESPIRATION RATE: 16 BRPM

## 2025-01-28 PROBLEM — E87.20 LACTIC ACIDOSIS: Status: RESOLVED | Noted: 2025-01-19 | Resolved: 2025-01-28

## 2025-01-28 LAB
GLUCOSE SERPL-MCNC: 220 MG/DL (ref 65–140)
GLUCOSE SERPL-MCNC: 308 MG/DL (ref 65–140)
GLUCOSE SERPL-MCNC: 313 MG/DL (ref 65–140)

## 2025-01-28 PROCEDURE — 82948 REAGENT STRIP/BLOOD GLUCOSE: CPT

## 2025-01-28 PROCEDURE — 99239 HOSP IP/OBS DSCHRG MGMT >30: CPT | Performed by: NURSE PRACTITIONER

## 2025-01-28 PROCEDURE — 94664 DEMO&/EVAL PT USE INHALER: CPT

## 2025-01-28 PROCEDURE — 94668 MNPJ CHEST WALL SBSQ: CPT

## 2025-01-28 RX ORDER — TACROLIMUS 1 MG/1
3 CAPSULE ORAL 2 TIMES DAILY
Start: 2025-01-28

## 2025-01-28 RX ORDER — DIPHENHYDRAMINE HYDROCHLORIDE 50 MG/ML
25 INJECTION INTRAMUSCULAR; INTRAVENOUS ONCE
Status: COMPLETED | OUTPATIENT
Start: 2025-01-28 | End: 2025-01-28

## 2025-01-28 RX ORDER — INSULIN LISPRO 100 [IU]/ML
10 INJECTION, SOLUTION INTRAVENOUS; SUBCUTANEOUS
Status: DISCONTINUED | OUTPATIENT
Start: 2025-01-29 | End: 2025-01-28 | Stop reason: HOSPADM

## 2025-01-28 RX ORDER — INSULIN LISPRO 100 [IU]/ML
12 INJECTION, SOLUTION INTRAVENOUS; SUBCUTANEOUS
Status: DISCONTINUED | OUTPATIENT
Start: 2025-01-28 | End: 2025-01-28

## 2025-01-28 RX ORDER — HEPARIN 100 UNIT/ML
100 SYRINGE INTRAVENOUS ONCE
Status: COMPLETED | OUTPATIENT
Start: 2025-01-28 | End: 2025-01-28

## 2025-01-28 RX ORDER — PREDNISONE 10 MG/1
50 TABLET ORAL DAILY
Qty: 15 TABLET | Refills: 0 | Status: SHIPPED | OUTPATIENT
Start: 2025-01-29 | End: 2025-02-01

## 2025-01-28 RX ORDER — INSULIN GLARGINE 100 [IU]/ML
20 INJECTION, SOLUTION SUBCUTANEOUS EVERY MORNING
Status: DISCONTINUED | OUTPATIENT
Start: 2025-01-28 | End: 2025-01-28 | Stop reason: HOSPADM

## 2025-01-28 RX ORDER — INSULIN LISPRO 100 [IU]/ML
12 INJECTION, SOLUTION INTRAVENOUS; SUBCUTANEOUS
Status: DISCONTINUED | OUTPATIENT
Start: 2025-01-28 | End: 2025-01-28 | Stop reason: HOSPADM

## 2025-01-28 RX ORDER — INSULIN LISPRO 100 [IU]/ML
5 INJECTION, SOLUTION INTRAVENOUS; SUBCUTANEOUS
Status: DISCONTINUED | OUTPATIENT
Start: 2025-01-29 | End: 2025-01-28

## 2025-01-28 RX ORDER — MORPHINE SULFATE 15 MG/1
15 TABLET, FILM COATED, EXTENDED RELEASE ORAL EVERY 8 HOURS SCHEDULED
Qty: 9 TABLET | Refills: 0 | Status: SHIPPED | OUTPATIENT
Start: 2025-01-28 | End: 2025-01-31

## 2025-01-28 RX ADMIN — Medication 500 MG: at 09:19

## 2025-01-28 RX ADMIN — ATOVAQUONE 1500 MG: 750 SUSPENSION ORAL at 09:26

## 2025-01-28 RX ADMIN — FUROSEMIDE 40 MG: 40 TABLET ORAL at 09:19

## 2025-01-28 RX ADMIN — INSULIN LISPRO 12 UNITS: 100 INJECTION, SOLUTION INTRAVENOUS; SUBCUTANEOUS at 12:18

## 2025-01-28 RX ADMIN — HYDROMORPHONE HYDROCHLORIDE 1.5 MG: 2 INJECTION INTRAMUSCULAR; INTRAVENOUS; SUBCUTANEOUS at 06:46

## 2025-01-28 RX ADMIN — INSULIN LISPRO 3 UNITS: 100 INJECTION, SOLUTION INTRAVENOUS; SUBCUTANEOUS at 12:17

## 2025-01-28 RX ADMIN — INSULIN LISPRO 3 UNITS: 100 INJECTION, SOLUTION INTRAVENOUS; SUBCUTANEOUS at 07:48

## 2025-01-28 RX ADMIN — VALACYCLOVIR HYDROCHLORIDE 500 MG: 500 TABLET, FILM COATED ORAL at 09:19

## 2025-01-28 RX ADMIN — POLYETHYLENE GLYCOL 3350 17 G: 17 POWDER, FOR SOLUTION ORAL at 09:19

## 2025-01-28 RX ADMIN — HYDROMORPHONE HYDROCHLORIDE 1.5 MG: 2 INJECTION INTRAMUSCULAR; INTRAVENOUS; SUBCUTANEOUS at 12:53

## 2025-01-28 RX ADMIN — TACROLIMUS 3 MG: 1 CAPSULE ORAL at 09:16

## 2025-01-28 RX ADMIN — BUDESONIDE AND FORMOTEROL FUMARATE DIHYDRATE 2 PUFF: 160; 4.5 AEROSOL RESPIRATORY (INHALATION) at 09:34

## 2025-01-28 RX ADMIN — PANCRELIPASE 48000 UNITS: 120000; 24000; 76000 CAPSULE, DELAYED RELEASE PELLETS ORAL at 12:17

## 2025-01-28 RX ADMIN — PANCRELIPASE 48000 UNITS: 120000; 24000; 76000 CAPSULE, DELAYED RELEASE PELLETS ORAL at 07:50

## 2025-01-28 RX ADMIN — NYSTATIN 500000 UNITS: 100000 SUSPENSION ORAL at 12:17

## 2025-01-28 RX ADMIN — OXYCODONE HYDROCHLORIDE AND ACETAMINOPHEN 250 MG: 500 TABLET ORAL at 09:17

## 2025-01-28 RX ADMIN — OXYCODONE HYDROCHLORIDE 10 MG: 10 TABLET ORAL at 04:08

## 2025-01-28 RX ADMIN — PREGABALIN 150 MG: 75 CAPSULE ORAL at 09:18

## 2025-01-28 RX ADMIN — APIXABAN 5 MG: 5 TABLET, FILM COATED ORAL at 09:19

## 2025-01-28 RX ADMIN — HEPARIN 100 UNITS: 100 SYRINGE at 17:54

## 2025-01-28 RX ADMIN — Medication 1000 UNITS: at 09:19

## 2025-01-28 RX ADMIN — CYANOCOBALAMIN TAB 500 MCG 1000 MCG: 500 TAB at 09:19

## 2025-01-28 RX ADMIN — INSULIN GLARGINE 20 UNITS: 100 INJECTION, SOLUTION SUBCUTANEOUS at 09:19

## 2025-01-28 RX ADMIN — PREDNISONE 50 MG: 20 TABLET ORAL at 09:17

## 2025-01-28 RX ADMIN — DIPHENHYDRAMINE HYDROCHLORIDE 25 MG: 50 INJECTION, SOLUTION INTRAMUSCULAR; INTRAVENOUS at 13:35

## 2025-01-28 RX ADMIN — MORPHINE SULFATE 15 MG: 15 TABLET, EXTENDED RELEASE ORAL at 05:09

## 2025-01-28 RX ADMIN — OXYCODONE HYDROCHLORIDE 10 MG: 10 TABLET ORAL at 09:16

## 2025-01-28 RX ADMIN — DIPHENHYDRAMINE HYDROCHLORIDE 25 MG: 50 INJECTION, SOLUTION INTRAMUSCULAR; INTRAVENOUS at 00:38

## 2025-01-28 RX ADMIN — PANTOPRAZOLE SODIUM 40 MG: 40 TABLET, DELAYED RELEASE ORAL at 06:46

## 2025-01-28 RX ADMIN — NYSTATIN 500000 UNITS: 100000 SUSPENSION ORAL at 09:26

## 2025-01-28 RX ADMIN — GUAIFENESIN 1200 MG: 600 TABLET ORAL at 09:19

## 2025-01-28 RX ADMIN — MORPHINE SULFATE 15 MG: 15 TABLET, EXTENDED RELEASE ORAL at 14:15

## 2025-01-28 RX ADMIN — INSULIN LISPRO 5 UNITS: 100 INJECTION, SOLUTION INTRAVENOUS; SUBCUTANEOUS at 07:48

## 2025-01-28 NOTE — PLAN OF CARE
Problem: PAIN - ADULT  Goal: Verbalizes/displays adequate comfort level or baseline comfort level  Description: Interventions:  - Encourage patient to monitor pain and request assistance  - Assess pain using appropriate pain scale  - Administer analgesics based on type and severity of pain and evaluate response  - Implement non-pharmacological measures as appropriate and evaluate response  - Consider cultural and social influences on pain and pain management  - Notify physician/advanced practitioner if interventions unsuccessful or patient reports new pain  Outcome: Progressing     Problem: INFECTION - ADULT  Goal: Absence or prevention of progression during hospitalization  Description: INTERVENTIONS:  - Assess and monitor for signs and symptoms of infection  - Monitor lab/diagnostic results  - Monitor all insertion sites, i.e. indwelling lines, tubes, and drains  - Monitor endotracheal if appropriate and nasal secretions for changes in amount and color  - [place] appropriate cooling/warming therapies per order  - Administer medications as ordered  - Instruct and encourage patient and family to use good hand hygiene technique  - Identify and instruct in appropriate isolation precautions for identified infection/condition  Outcome: Progressing  Goal: Absence of fever/infection during neutropenic period  Description: INTERVENTIONS:  - Monitor WBC    Outcome: Progressing     Problem: SAFETY ADULT  Goal: Patient will remain free of falls  Description: INTERVENTIONS:  - Educate patient/family on patient safety including physical limitations  - Instruct patient to call for assistance with activity   - Consult OT/PT to assist with strengthening/mobility   - Keep Call bell within reach  - Keep bed low and locked with side rails adjusted as appropriate  - Keep care items and personal belongings within reach  - Initiate and maintain comfort rounds  - Make Fall Risk Sign visible to staff  - Offer Toileting every 2 Hours,  in advance of need  - Initiate/Maintain alarm  - Obtain necessary fall risk management equipment  - Apply yellow socks and bracelet for high fall risk patients  - Consider moving patient to room near nurses station  Outcome: Progressing  Goal: Maintain or return to baseline ADL function  Description: INTERVENTIONS:  -  Assess patient's ability to carry out ADLs; assess patient's baseline for ADL function and identify physical deficits which impact ability to perform ADLs (bathing, care of mouth/teeth, toileting, grooming, dressing, etc.)  - Assess/evaluate cause of self-care deficits   - Assess range of motion  - Assess patient's mobility; develop plan if impaired  - Assess patient's need for assistive devices and provide as appropriate  - Encourage maximum independence but intervene and supervise when necessary  - Involve family in performance of ADLs  - Assess for home care needs following discharge   - Consider OT consult to assist with ADL evaluation and planning for discharge  - Provide patient education as appropriate  Outcome: Progressing  Goal: Maintains/Returns to pre admission functional level  Description: INTERVENTIONS:  - Perform AM-PAC 6 Click Basic Mobility/ Daily Activity assessment daily.  - Set and communicate daily mobility goal to care team and patient/family/caregiver.   - Collaborate with rehabilitation services on mobility goals if consulted  - Perform Range of Motion 3 times a day.  - Reposition patient every 3 hours.  - Dangle patient 3 times a day  - Stand patient 3 times a day  - Ambulate patient 3 times a day  - Out of bed to chair 3 times a day   - Out of bed for meals 3 times a day  - Out of bed for toileting  - Record patient progress and toleration of activity level   Outcome: Progressing     Problem: DISCHARGE PLANNING  Goal: Discharge to home or other facility with appropriate resources  Description: INTERVENTIONS:  - Identify barriers to discharge w/patient and caregiver  - Arrange  for needed discharge resources and transportation as appropriate  - Identify discharge learning needs (meds, wound care, etc.)  - Arrange for interpretive services to assist at discharge as needed  - Refer to Case Management Department for coordinating discharge planning if the patient needs post-hospital services based on physician/advanced practitioner order or complex needs related to functional status, cognitive ability, or social support system  Outcome: Progressing     Problem: Knowledge Deficit  Goal: Patient/family/caregiver demonstrates understanding of disease process, treatment plan, medications, and discharge instructions  Description: Complete learning assessment and assess knowledge base.  Interventions:  - Provide teaching at level of understanding  - Provide teaching via preferred learning methods  Outcome: Progressing     Problem: RESPIRATORY - ADULT  Goal: Achieves optimal ventilation and oxygenation  Description: INTERVENTIONS:  - Assess for changes in respiratory status  - Assess for changes in mentation and behavior  - Position to facilitate oxygenation and minimize respiratory effort  - Oxygen administered by appropriate delivery if ordered  - Initiate smoking cessation education as indicated  - Encourage broncho-pulmonary hygiene including cough, deep breathe, Incentive Spirometry  - Assess the need for suctioning and aspirate as needed  - Assess and instruct to report SOB or any respiratory difficulty  - Respiratory Therapy support as indicated  Outcome: Progressing

## 2025-01-28 NOTE — PLAN OF CARE
Problem: PAIN - ADULT  Goal: Verbalizes/displays adequate comfort level or baseline comfort level  Description: Interventions:  - Encourage patient to monitor pain and request assistance  - Assess pain using appropriate pain scale  - Administer analgesics based on type and severity of pain and evaluate response  - Implement non-pharmacological measures as appropriate and evaluate response  - Consider cultural and social influences on pain and pain management  - Notify physician/advanced practitioner if interventions unsuccessful or patient reports new pain  Outcome: Progressing     Problem: INFECTION - ADULT  Goal: Absence or prevention of progression during hospitalization  Description: INTERVENTIONS:  - Assess and monitor for signs and symptoms of infection  - Monitor lab/diagnostic results  - Monitor all insertion sites, i.e. indwelling lines, tubes, and drains  - Monitor endotracheal if appropriate and nasal secretions for changes in amount and color  - George appropriate cooling/warming therapies per order  - Administer medications as ordered  - Instruct and encourage patient and family to use good hand hygiene technique  - Identify and instruct in appropriate isolation precautions for identified infection/condition  Outcome: Progressing  Goal: Absence of fever/infection during neutropenic period  Description: INTERVENTIONS:  - Monitor WBC    Outcome: Progressing     Problem: SAFETY ADULT  Goal: Patient will remain free of falls  Description: INTERVENTIONS:  - Educate patient/family on patient safety including physical limitations  - Instruct patient to call for assistance with activity   - Consult OT/PT to assist with strengthening/mobility   - Keep Call bell within reach  - Keep bed low and locked with side rails adjusted as appropriate  - Keep care items and personal belongings within reach  - Initiate and maintain comfort rounds  - Make Fall Risk Sign visible to staff  - Offer Toileting every 2 Hours,  in advance of need  - Apply yellow socks and bracelet for high fall risk patients  - Consider moving patient to room near nurses station  Outcome: Progressing  Goal: Maintain or return to baseline ADL function  Description: INTERVENTIONS:  -  Assess patient's ability to carry out ADLs; assess patient's baseline for ADL function and identify physical deficits which impact ability to perform ADLs (bathing, care of mouth/teeth, toileting, grooming, dressing, etc.)  - Assess/evaluate cause of self-care deficits   - Assess range of motion  - Assess patient's mobility; develop plan if impaired  - Assess patient's need for assistive devices and provide as appropriate  - Encourage maximum independence but intervene and supervise when necessary  - Involve family in performance of ADLs  - Assess for home care needs following discharge   - Consider OT consult to assist with ADL evaluation and planning for discharge  - Provide patient education as appropriate  Outcome: Progressing  Goal: Maintains/Returns to pre admission functional level  Description: INTERVENTIONS:  - Perform AM-PAC 6 Click Basic Mobility/ Daily Activity assessment daily.  - Set and communicate daily mobility goal to care team and patient/family/caregiver.   - Collaborate with rehabilitation services on mobility goals if consulted  - Perform Range of Motion 3 times a day.  - Reposition patient every 2 hours.  - Dangle patient 3 times a day  - Stand patient 3 times a day  - Ambulate patient 3 times a day  - Out of bed to chair 3 times a day   - Out of bed for meals 3 times a day  - Out of bed for toileting  - Record patient progress and toleration of activity level   Outcome: Progressing     Problem: DISCHARGE PLANNING  Goal: Discharge to home or other facility with appropriate resources  Description: INTERVENTIONS:  - Identify barriers to discharge w/patient and caregiver  - Arrange for needed discharge resources and transportation as appropriate  - Identify  discharge learning needs (meds, wound care, etc.)  - Arrange for interpretive services to assist at discharge as needed  - Refer to Case Management Department for coordinating discharge planning if the patient needs post-hospital services based on physician/advanced practitioner order or complex needs related to functional status, cognitive ability, or social support system  Outcome: Progressing     Problem: Knowledge Deficit  Goal: Patient/family/caregiver demonstrates understanding of disease process, treatment plan, medications, and discharge instructions  Description: Complete learning assessment and assess knowledge base.  Interventions:  - Provide teaching at level of understanding  - Provide teaching via preferred learning methods  Outcome: Progressing     Problem: RESPIRATORY - ADULT  Goal: Achieves optimal ventilation and oxygenation  Description: INTERVENTIONS:  - Assess for changes in respiratory status  - Assess for changes in mentation and behavior  - Position to facilitate oxygenation and minimize respiratory effort  - Oxygen administered by appropriate delivery if ordered  - Initiate smoking cessation education as indicated  - Encourage broncho-pulmonary hygiene including cough, deep breathe, Incentive Spirometry  - Assess the need for suctioning and aspirate as needed  - Assess and instruct to report SOB or any respiratory difficulty  - Respiratory Therapy support as indicated  Outcome: Progressing

## 2025-01-28 NOTE — DISCHARGE INSTR - AVS FIRST PAGE
Discussed with [facility] Transplant team to discharge patient with 3 days of MS Contin and will set her up with palliative team to manage her pain at home.    Please remember to reach out to your endocrinologist on adjusting your insulin while on higher doses of steroids.

## 2025-01-28 NOTE — ED PROVIDER NOTES
"Time reflects when diagnosis was documented in both MDM as applicable and the Disposition within this note       Time User Action Codes Description Comment    1/19/2025  3:49 PM DanieleCrystal mendosa Add [R07.9] Chest pain, unspecified     1/19/2025  3:49 PM DanieleMckinley mendosaley Remove [R07.9] Chest pain, unspecified     1/19/2025  3:49 PM Daniele, Crystal Add [R07.81] Pleuritic chest pain     1/19/2025  3:49 PM Daniele, Crystal Add [R06.00] Dyspnea     1/19/2025  3:50 PM Daniele, Crystal Add [R05.8] Productive cough     1/19/2025  3:50 PM DanieleMckinley mendosaley Modify [R07.81] Pleuritic chest pain     1/19/2025  3:50 PM DanieleMckinley mendosaley Modify [R06.00] Dyspnea     1/19/2025  6:00 PM SchivuJose AlfredoAnna Add [E84.9] Cystic fibrosis (HCC)     1/19/2025  6:00 PM SchivuJose AlfredoAnna Add [Z94.2] Hx of lung transplant (HCC)           ED Disposition       ED Disposition   Admit    Condition   Stable    Date/Time   Sun Jan 19, 2025  3:53 PM    Comment   Case was discussed with Dr. Esposito and the patient's admission status was agreed to be Admission Status: observation status to the service of Dr. Esposito .               Assessment & Plan       Medical Decision Making  49 yof with history of CF s/p lung transplant x 2, left lung is only one that \"works\", presented with pleuritic chest pain, productive cough for the past several days, and dyspnea. Denies fever, is concerned that the pain is similar to prior infections of left lung.  Hypoxic on room air while ambulating but SpO2 and remainder of VS WNL at rest. Has coarse lung sounds on left.   Differential includes but is not limited to bronchiolitis, pneumonia, URI, PE, pleurisy, musculoskeletal chest pain, less likely pneumothorax.  Pain could also be secondary to her chronic rejection.  WBC 3, lactic 2.7, however does not meet SIRS criteria.   CT PE study with tree in bud opacities concerning for infectious process. Flu/COVID negative. ECG and trop normal.   Given her extensive history and evidence " of potential infectious process on CT, will admit to medicine pending sputum cultures and remainder of labs.  Of note, I did personally speak with her transplant surgery service at [facility] and they are in agreement with her admission to this hospital.  RP2 panel, tacrolimus level, and procal pending at time of admission.    Amount and/or Complexity of Data Reviewed  External Data Reviewed: labs, radiology, ECG and notes.  Labs: ordered. Decision-making details documented in ED Course.  Radiology: ordered. Decision-making details documented in ED Course.  ECG/medicine tests: ordered and independent interpretation performed.    Risk  Prescription drug management.  Decision regarding hospitalization.        ED Course as of 01/28/25 1159   Sun Jan 19, 2025   1512 CTA chest pe study  IMPRESSION:     No pulmonary embolism.     Left lung transplant demonstrating compensatory hyperinflation and diffuse tree-in-bud nodularity consistent with infectious/inflammatory etiology similar to prior study.     Status post right pneumonectomy with shifting of the mediastinum to the right and dilated esophagus likely related to resultant mass effect at the GE junction.        1545 Delta 2hr hsTnI: -1       Medications   ALPRAZolam (XANAX) tablet 1 mg (1 mg Oral Given 1/20/25 0028)   apixaban (ELIQUIS) tablet 5 mg (5 mg Oral Given 1/20/25 0911)   atovaquone (MEPRON) oral suspension 1,500 mg (1,500 mg Oral Given 1/20/25 0947)   azithromycin (ZITHROMAX) tablet 250 mg (250 mg Oral Given 1/20/25 0908)   bisacodyl (DULCOLAX) EC tablet 5 mg (5 mg Oral Given 1/19/25 2226)   budesonide-formoterol (SYMBICORT) 160-4.5 mcg/act inhaler 2 puff (2 puffs Inhalation Given 1/20/25 1009)   Cholecalciferol (VITAMIN D3) tablet 5,000 Units (5,000 Units Oral Given 1/20/25 0912)   famotidine (PEPCID) tablet 40 mg (40 mg Oral Given 1/19/25 2226)   magnesium gluconate (MAGONATE) tablet 500 mg (500 mg Oral Given 1/20/25 0946)   oxybutynin (DITROPAN-XL) 24 hr  tablet 15 mg (15 mg Oral Given 1/19/25 2226)   pancrelipase (Lip-Prot-Amyl) (CREON) delayed release capsule 48,000 Units (48,000 Units Oral Given 1/20/25 1310)   polyethylene glycol (MIRALAX) packet 17 g (17 g Oral Given 1/20/25 0913)   pregabalin (LYRICA) capsule 150 mg (150 mg Oral Given 1/20/25 0911)   tacrolimus (PROGRAF) capsule 3 mg (3 mg Oral Given 1/20/25 0945)   cyanocobalamin (VITAMIN B-12) tablet 1,000 mcg (1,000 mcg Oral Given 1/20/25 0908)   sodium chloride 0.9 % infusion (0 mL/hr Intravenous Stopped 1/20/25 0616)   acetaminophen (TYLENOL) tablet 975 mg (has no administration in time range)   oxyCODONE (ROXICODONE) IR tablet 5 mg ( Oral See Alternative 1/20/25 1300)     Or   oxyCODONE (ROXICODONE) immediate release tablet 10 mg (10 mg Oral Given 1/20/25 1300)   naloxone (NARCAN) 0.04 mg/mL syringe 0.04 mg (has no administration in time range)   ondansetron (ZOFRAN) injection 4 mg (has no administration in time range)   aluminum-magnesium hydroxide-simethicone (MAALOX) oral suspension 30 mL (30 mL Oral Given 1/20/25 0731)   HYDROcodone Bit-Homatrop MBr (HYCODAN) oral syrup 5 mL (has no administration in time range)   insulin lispro (HumALOG/ADMELOG) 100 units/mL subcutaneous injection 1-5 Units (1 Units Subcutaneous Given 1/20/25 1302)   ipratropium-albuterol (DUO-NEB) 0.5-2.5 mg/3 mL inhalation solution 3 mL (has no administration in time range)   ascorbic acid (VITAMIN C) tablet 250 mg (250 mg Oral Given 1/20/25 0910)   guaiFENesin (MUCINEX) 12 hr tablet 1,200 mg (1,200 mg Oral Given 1/20/25 0908)   lidocaine (LIDODERM) 5 % patch 1 patch (1 patch Topical Medication Applied 1/20/25 0450)   pantoprazole (PROTONIX) EC tablet 40 mg (has no administration in time range)   furosemide (LASIX) tablet 40 mg (has no administration in time range)   valACYclovir (VALTREX) tablet 500 mg (has no administration in time range)   HYDROmorphone HCl (DILAUDID) injection 0.2 mg (0.2 mg Intravenous Given 1/19/25 3917)  "  sodium chloride 0.9 % bolus 500 mL (0 mL Intravenous Stopped 1/19/25 1927)   HYDROmorphone (DILAUDID) injection 0.5 mg (0.5 mg Intravenous Given 1/19/25 1352)   iohexol (OMNIPAQUE) 350 MG/ML injection (MULTI-DOSE) 85 mL (85 mL Intravenous Given 1/19/25 1435)   HYDROmorphone (DILAUDID) injection 0.5 mg (0.5 mg Intravenous Given 1/19/25 1541)   ceftriaxone (ROCEPHIN) 1 g/50 mL in dextrose IVPB (0 mg Intravenous Stopped 1/19/25 1928)   lactated ringers bolus 500 mL (0 mL Intravenous Stopped 1/19/25 1928)   methocarbamol (ROBAXIN) tablet 500 mg (500 mg Oral Given 1/20/25 0510)   predniSONE tablet 20 mg (has no administration in time range)       ED Risk Strat Scores                                              History of Present Illness       Chief Complaint   Patient presents with    Chest Pain     Pt c/o chest pain \"all over\" and increased SOB x 3 days, lung transplant pt       Past Medical History:   Diagnosis Date    Abnormal uterine bleeding (AUB) 7/7/2022    ABPA (allergic bronchopulmonary aspergillosis) (HCC) 07/08/2012    Acute kidney injury (HCC) 09/08/2017    Acute on chronic respiratory failure (HCC) 03/04/2017    Last Assessment & Plan:  Formatting of this note might be different from the original. Recurrent with clinical worsening with enlarging PTX, s/p multiple chest tubes, but new fevers 6/19 and worsening leukocytosis indicating infection as cause of 6/19 decompensation with profound hypoxia in setting of shunting through right lung and continued pneumothorax s/p 2nd chest tube placement.  6/27: Right    Anaphylaxis 06/06/2022    Anemia     Asthma     Chest pain 09/21/2021    Chronic pain     Chronic respiratory failure with hypoxia (HCC) 09/07/2021    COVID-19 06/03/2022    Cystic fibrosis (HCC)     Deep vein thrombosis (HCC)     Diabetes mellitus (HCC)     Fever 09/22/2024    GERD (gastroesophageal reflux disease)     HBP (high blood pressure)     HCAP (healthcare-associated pneumonia) 03/06/2020    " History of transfusion     Influenza B 02/23/2020    Kidney stone     Moderate protein-calorie malnutrition (HCC) 01/08/2022    Neutropenic fever  (HCC) 09/26/2020    Pneumonia due to Pseudomonas species (HCC) 10/28/2010    Last Assessment & Plan:  Formatting of this note might be different from the original. Polymicrobial pna w/ pseudomonas, M. Abscessus, Cadida glabrata/albicans    PONV (postoperative nausea and vomiting)     Pseudomonas aeruginosa infection 09/20/2012    Renal calculus     Squamous cell skin cancer 01/12/2023    SCCIS- Left upper thigh    Transplant recipient     Lung- 2013, 2017    Ulcerative colitis (HCC)     Wellness examination 02/04/2013    Formatting of this note might be different from the original. Discussed with patient and acknowledgement form signed on 2/4/2013 (ALYSHA)      Past Surgical History:   Procedure Laterality Date    APPENDECTOMY      AUGMENTATION MAMMAPLASTY Bilateral 2004    AUGMENTATION MAMMAPLASTY Right 2021    removed     BREAST SURGERY      Aug.     BRONCHOSCOPY      CHOLECYSTECTOMY      COLONOSCOPY      EGD  07/2019    Yeast Infect.     FL RETROGRADE PYELOGRAM  01/09/2020    FL RETROGRADE PYELOGRAM  02/04/2020    FL RETROGRADE PYELOGRAM  9/20/2024    GASTROSCOPY  03/01/2024    GASTROSTOMY TUBE, PLACE      IR BALLOON-OCCLUDED ANTEGRADE TRANSVENOUS OBLITERATION (BATO)  11/22/2022    IR PORT PLACEMENT  03/22/2022    IR PORT REMOVAL  01/10/2022    IR PORT STRIPPING  11/25/2022    IR TUNNELED CENTRAL LINE PLACEMENT  01/13/2022    LUNG SURGERY      LUNG TRANSPLANT      X2    IL CYSTO/URETERO W/LITHOTRIPSY &INDWELL STENT INSRT Right 02/04/2020    Procedure: CYSTOSCOPY URETEROSCOPY WITH LITHOTRIPSY HOLMIUM LASER, RETROGRADE PYELOGRAM AND INSERTION STENT URETERAL;  Surgeon: Patricio Perry MD;  Location: [facility];  Service: Urology    IL CYSTO/URETERO W/LITHOTRIPSY &INDWELL STENT INSRT Left 9/20/2024    Procedure: CYSTOSCOPY URETEROSCOPY WITH LITHOTRIPSY HOLMIUM LASER,  "RETROGRADE PYELOGRAM AND INSERTION STENT URETERAL, BASKET STONE EXTRACTION;  Surgeon: Lee Briceno MD;  Location: [facility];  Service: Urology    ID CYSTOURETHROSCOPY W/URETERAL CATHETERIZATION Right 2020    Procedure: CYSTOSCOPY RETROGRADE PYELOGRAM WITH INSERTION STENT URETERAL;  Surgeon: Patricio Perry MD;  Location: [facility];  Service: Urology    SKIN BIOPSY      US GUIDED VASCULAR ACCESS  2017      Family History   Problem Relation Age of Onset    Alcohol abuse Mother     Mental illness Mother     Depression Mother     Alcohol abuse Father     COPD Maternal Grandmother     Lung cancer Maternal Grandmother 65    Cancer Maternal Grandmother     COPD Maternal Grandfather     Cancer Maternal Grandfather     COPD Paternal Grandmother     Lung cancer Paternal Grandmother 65    No Known Problems Sister     No Known Problems Paternal Aunt     Breast cancer Neg Hx     Ovarian cancer Neg Hx     Colon cancer Neg Hx       Social History     Tobacco Use    Smoking status: Former     Current packs/day: 0.00     Average packs/day: 0.5 packs/day for 12.0 years (6.0 ttl pk-yrs)     Types: Cigarettes     Start date: 1993     Quit date: 2005     Years since quittin.0     Passive exposure: Past    Smokeless tobacco: Never   Vaping Use    Vaping status: Never Used   Substance Use Topics    Alcohol use: Yes     Alcohol/week: 2.0 standard drinks of alcohol     Types: 2 Glasses of wine per week     Comment: social    Drug use: Yes     Frequency: 2.0 times per week     Types: Marijuana     Comment: medical edible marijuana prescribed      E-Cigarette/Vaping    E-Cigarette Use Never User       E-Cigarette/Vaping Substances    Nicotine No     THC No     CBD No     Flavoring No     Other No     Unknown No       I have reviewed and agree with the history as documented.     Maximilian is a 49 yof with history of CF s/p lung transplant x 2 with chronic rejection, left lung is only one that \"works\", who presents " with pleuritic chest pain, productive cough for the past several days with brown sputum, presented today due to dyspnea with minimal exertion. Denies fever or chills, is concerned that the pain is similar to prior infections of left lung.  Denies headache, sore throat, nasal congestion, palpitations, abdominal pain, nausea, vomiting, peripheral edema, rash, or unilateral calf pain or swelling.  Has not missed any doses of her antirejection medications.        Review of Systems   Constitutional:  Positive for fatigue. Negative for chills, diaphoresis and fever.   HENT: Negative.  Negative for congestion, ear pain, rhinorrhea, sneezing and sore throat.    Eyes: Negative.    Respiratory:  Positive for cough and shortness of breath. Negative for wheezing.    Cardiovascular:  Positive for chest pain. Negative for palpitations and leg swelling.   Gastrointestinal: Negative.  Negative for abdominal distention, abdominal pain, constipation, diarrhea, nausea and vomiting.   Genitourinary: Negative.  Negative for difficulty urinating, dysuria and hematuria.   Musculoskeletal: Negative.  Negative for arthralgias, myalgias, neck pain and neck stiffness.   Skin: Negative.  Negative for color change, pallor, rash and wound.   Allergic/Immunologic: Negative.    Neurological: Negative.  Negative for dizziness, weakness, light-headedness and headaches.   Hematological: Negative.  Does not bruise/bleed easily.   Psychiatric/Behavioral: Negative.     All other systems reviewed and are negative.          Objective       ED Triage Vitals   Temperature Pulse Blood Pressure Respirations SpO2 Patient Position - Orthostatic VS   01/19/25 1151 01/19/25 1151 01/19/25 1151 01/19/25 1151 01/19/25 1151 01/19/25 1151   97.8 °F (36.6 °C) 93 146/66 22 90 % Sitting      Temp Source Heart Rate Source BP Location FiO2 (%) Pain Score    01/19/25 1151 01/19/25 1151 01/19/25 1151 -- 01/19/25 1305    Temporal Monitor Left arm  7      Vitals      Date and  Time Temp Pulse SpO2 Resp BP Pain Score FACES Pain Rating User   01/28/25 0916 -- -- -- -- -- 8 -- JLW   01/28/25 0744 97.9 °F (36.6 °C) -- -- -- 128/94 -- -- DII   01/28/25 0743 -- -- -- -- 128/94 -- -- DII   01/28/25 0649 -- -- -- -- 167/108 -- -- DII   01/28/25 0646 -- -- -- -- -- 8 -- BG   01/28/25 0511 -- -- -- -- 150/87 -- -- DII   01/28/25 0509 -- -- -- -- -- 7 -- BG   01/28/25 0408 -- -- -- -- -- 7 -- BG   01/28/25 0045 -- -- -- 16 -- -- -- BG   01/28/25 0045 98 °F (36.7 °C) -- -- -- 160/95 -- -- DII   01/27/25 2234 -- -- -- -- 146/87 -- -- BG   01/27/25 2233 -- -- -- -- -- 8 -- BG   01/27/25 2107 -- -- -- -- -- 8 -- BG   01/27/25 2106 -- -- -- -- 155/94 -- -- BG   01/27/25 1811 -- -- -- -- -- 7 -- MS   01/27/25 1625 -- -- -- -- -- 7 -- MS   01/27/25 1541 -- -- 94 % -- -- -- -- TW   01/27/25 1541 98.1 °F (36.7 °C) -- -- 16 127/74 -- -- DII   01/27/25 1541 98.1 °F (36.7 °C) -- -- 15 127/74 -- -- DII   01/27/25 1303 -- -- -- -- -- 7 -- MS   01/27/25 1021 -- -- -- -- -- 9 -- MS   01/27/25 0814 98.6 °F (37 °C) -- -- -- 149/104 -- -- DII   01/27/25 0722 -- -- 94 % -- -- -- -- MD   01/27/25 0620 -- -- -- -- -- 8 -- AT   01/27/25 0405 -- -- -- -- -- 7 -- AT   01/27/25 0130 -- -- -- -- -- 7 -- AT   01/26/25 2343 -- -- -- -- -- 7 -- AT   01/26/25 2205 98.2 °F (36.8 °C) 95 95 % 16 -- -- -- BLESSING   01/26/25 2205 -- -- -- -- 148/84 -- -- DII   01/26/25 2158 -- -- -- -- -- 10 - Worst Possible Pain -- AT   01/26/25 1940 -- -- 95 % -- -- -- -- MS   01/26/25 1740 -- -- -- -- -- 10 - Worst Possible Pain -- MB   01/26/25 1510 98.2 °F (36.8 °C) -- -- -- 147/81 -- -- DII   01/26/25 1509 -- -- -- -- 147/81 -- -- DII   01/26/25 1509 -- -- -- -- -- 10 - Worst Possible Pain -- MB   01/26/25 1105 -- -- -- -- -- 10 - Worst Possible Pain -- MB   01/26/25 0850 -- -- -- -- -- 8 -- MB   01/26/25 0737 98 °F (36.7 °C) -- -- 16 126/75 -- -- DII   01/26/25 0716 -- -- 95 % -- -- -- -- AL   01/26/25 0634 -- -- -- -- -- 7 -- AT   01/26/25 0536 --  -- -- -- -- 7 -- AT   01/26/25 0321 -- -- -- -- -- 8 -- AT   01/26/25 0039 -- -- -- -- -- 8 -- AT   01/25/25 2300 -- -- -- -- -- 7 -- AT   01/25/25 2239 -- -- -- -- 156/82 -- -- DII   01/25/25 2029 -- -- -- -- -- 9 -- AT   01/25/25 1954 -- 95 95 % 20 -- -- -- MS   01/25/25 1814 -- -- -- -- -- 9 -- MB   01/25/25 1547 -- -- -- -- -- 9 -- MB   01/25/25 1459 -- -- -- -- 138/74 -- -- DII   01/25/25 1344 -- -- -- -- -- 7 -- MB   01/25/25 1120 -- -- -- -- -- 7 -- MB   01/25/25 0951 -- -- -- -- -- 9 -- MB   01/25/25 0747 -- -- -- 18 135/77 -- -- DII   01/25/25 0720 -- -- 98 % -- -- -- -- AL   01/25/25 0554 -- -- -- -- -- 8 -- AT   01/25/25 0211 -- -- -- -- -- 7 -- AT   01/25/25 0023 -- -- -- -- -- 7 -- AT   01/24/25 2148 -- 83 99 % 18 -- -- -- BLESSING   01/24/25 2148 -- -- -- -- 136/78 -- -- DII   01/24/25 2133 -- -- -- -- -- 7 -- AT   01/24/25 2029 -- 85 95 % 18 -- -- -- MS   01/24/25 1941 -- -- -- -- -- 8 -- AT   01/24/25 1518 98.2 °F (36.8 °C) 83 93 % 16 -- -- -- ES   01/24/25 1518 -- -- -- -- 156/107 -- -- DII   01/24/25 1332 -- -- 97 % -- -- -- -- GM   01/24/25 1323 -- -- -- -- -- 7 -- MB   01/24/25 1048 -- -- -- -- -- 8 -- MB   01/24/25 0742 -- -- -- -- -- 7 -- MB   01/24/25 0550 -- -- -- -- -- 7 -- BG   01/24/25 0303 -- -- -- -- -- 8 -- BG   01/24/25 0135 -- -- -- -- -- 9 -- BG   01/23/25 2311 -- -- -- -- 124/77 -- -- DII   01/23/25 2145 -- -- -- -- -- 7 -- BG   01/23/25 2118 97.8 °F (36.6 °C) -- -- -- 145/82 -- -- DII   01/23/25 1942 -- -- -- -- -- 9 -- BG   01/23/25 1835 -- -- -- -- -- 8 -- AS   01/23/25 1511 98.2 °F (36.8 °C) -- -- 16 121/69 -- -- DII   01/23/25 1429 -- -- -- -- -- 7 -- AS   01/23/25 1201 -- -- -- -- -- 8 -- AS   01/23/25 0955 -- -- -- -- -- 8 -- AS   01/23/25 0709 98.4 °F (36.9 °C) -- -- 18 134/72 -- -- DII   01/23/25 0542 -- -- -- -- -- 7 -- NB   01/23/25 0237 -- -- -- -- -- 7 -- NB   01/22/25 2254 98.1 °F (36.7 °C) 80 97 % 20 132/80 -- -- NB   01/22/25 2253 -- -- -- -- -- 8 -- NB   01/22/25 2135 --  -- -- -- -- 8 -- NB   01/22/25 2007 -- -- -- -- -- 8 -- NB   01/22/25 1520 -- -- -- 17 138/81 -- -- DII   01/22/25 1425 -- -- -- -- -- 7 -- AS   01/22/25 1222 -- -- -- -- -- 8 --    01/22/25 0820 -- -- -- -- -- 8 -- AS   01/22/25 0746 98.6 °F (37 °C) -- -- 16 110/80 -- -- DII   01/22/25 0603 -- -- -- -- -- 8 -- NB   01/22/25 0328 -- -- -- -- -- 8 -- CT   01/22/25 0203 -- -- -- -- -- 8 -- NB   01/21/25 2048 98.6 °F (37 °C) 72 -- 20 -- -- -- NB   01/21/25 2048 -- -- -- -- 148/79 -- -- DII   01/21/25 2045 -- -- -- -- -- 8 -- NB   01/21/25 1937 -- -- -- -- -- 8 -- NB   01/21/25 1725 -- -- -- -- -- 8 -- AS   01/21/25 1536 -- -- -- -- -- 8 -- AS   01/21/25 1512 -- -- -- 18 -- -- -- BL   01/21/25 1512 98.7 °F (37.1 °C) -- -- -- 135/71 -- -- DII   01/21/25 1359 -- -- -- -- -- 8 -- AS   01/21/25 1126 -- -- -- -- -- 8 -- AS   01/21/25 0913 -- -- -- -- -- 8 -- AS   01/21/25 0751 99.2 °F (37.3 °C) -- -- 19 133/72 -- -- DII   01/21/25 0457 -- -- -- -- -- 7 -- JM   01/21/25 0151 -- -- -- -- -- 8 -- JM   01/20/25 2300 -- 77 93 % 13 -- -- -- EV   01/20/25 2258 -- 77 93 % 13 -- -- -- EV   01/20/25 2258 97.6 °F (36.4 °C) -- -- -- 161/86 -- -- DII   01/20/25 2248 -- -- -- -- -- 8 -- JM   01/20/25 2144 -- -- -- -- -- 8 -- JM   01/20/25 2100 -- -- 98 % -- -- No Pain -- JM   01/20/25 1849 -- -- -- -- -- 8 -- EB   01/20/25 1735 -- -- -- -- -- 8 -- EB   01/20/25 1530 -- -- 98 % -- -- -- -- EB   01/20/25 1528 -- -- -- -- -- 8 -- EB   01/20/25 1519 -- -- 98 % -- -- -- -- OW   01/20/25 1519 97.9 °F (36.6 °C) -- -- 17 137/70 -- -- DII   01/20/25 1518 97.9 °F (36.6 °C) -- -- 15 137/70 -- -- DII   01/20/25 1452 -- -- -- -- -- 8 -- TS   01/20/25 1300 -- -- -- -- -- 8 -- AM   01/20/25 1200 -- 64 98 % 18 116/63 -- -- AM   01/20/25 1130 -- 64 98 % 18 113/66 -- -- AM   01/20/25 1043 -- -- -- -- -- 8 -- TS   01/20/25 1030 -- 67 97 % 18 132/72 -- -- TS   01/20/25 1000 -- 62 98 % 16 131/78 -- -- TS   01/20/25 0829 -- -- -- -- -- 8 -- TS   01/20/25  0800 -- 64 98 % 16 116/71 -- -- LM   01/20/25 0732 -- -- -- -- -- 8 -- TS   01/20/25 0730 -- 71 96 % 18 145/90 -- -- TS   01/20/25 0700 -- 72 98 % -- 146/65 -- -- KG   01/20/25 0630 -- 60 98 % 20 131/78 -- -- LM   01/20/25 0615 -- 62 98 % 20 123/80 -- -- KG   01/20/25 0600 -- 62 98 % 20 123/80 -- -- KG   01/20/25 0500 -- 65 98 % 20 119/76 -- -- KG   01/20/25 0430 -- 64 97 % 20 122/65 -- -- KG   01/20/25 0400 -- 64 97 % 20 128/74 -- -- KG   01/20/25 0333 -- -- -- -- -- 8 -- KG   01/20/25 0330 -- 67 97 % 21 125/68 -- -- KG   01/20/25 0300 -- 68 98 % 20 129/67 -- -- KG   01/20/25 0239 -- -- -- -- -- 9 -- KG   01/20/25 0230 -- 62 98 % 21 118/64 -- -- KG   01/20/25 0215 -- 64 98 % -- -- -- -- KG   01/20/25 0200 -- 64 98 % 20 115/71 -- -- KG   01/20/25 0100 -- 69 97 % 20 144/79 -- -- KG   01/20/25 0030 -- 78 97 % 20 138/85 -- -- KG   01/20/25 0017 -- 83 94 % 20 -- -- -- KG   01/20/25 0015 -- 72 86 % 20 -- -- -- KG   01/20/25 0000 -- 78 88 % 20 140/73 -- -- KG   01/19/25 2333 -- -- -- -- -- 8 -- KG   01/19/25 2330 -- 80 94 % 20 147/101 -- -- KG   01/19/25 2300 -- 73 94 % 20 145/85 -- -- KG   01/19/25 2112 -- -- -- -- -- 8 -- DO   01/19/25 2005 -- -- -- -- -- 8 -- DO   01/19/25 1541 -- -- -- -- -- 8 -- DO   01/19/25 1445 -- 76 94 % 20 135/92 -- -- DO   01/19/25 1400 -- 76 92 % 20 119/73 -- -- DO   01/19/25 1352 -- -- -- -- -- 8 -- DO   01/19/25 1309 98.9 °F (37.2 °C) -- -- -- -- -- -- DO   01/19/25 1305 -- -- -- -- -- 7 -- DO   01/19/25 1300 -- 79 93 % 20 134/71 -- -- DO   01/19/25 1151 97.8 °F (36.6 °C) 93 90 % 22 146/66 -- -- JK            Physical Exam  Vitals and nursing note reviewed.   Constitutional:       General: She is not in acute distress.     Appearance: Normal appearance. She is not ill-appearing or diaphoretic.   HENT:      Head: Normocephalic and atraumatic.      Nose: Nose normal.      Mouth/Throat:      Mouth: Mucous membranes are moist.      Pharynx: Oropharynx is clear.   Eyes:      General: No scleral  icterus.     Extraocular Movements: Extraocular movements intact.      Conjunctiva/sclera: Conjunctivae normal.   Cardiovascular:      Rate and Rhythm: Normal rate and regular rhythm.      Pulses: Normal pulses.      Heart sounds: Normal heart sounds. No murmur heard.     No friction rub. No gallop.   Pulmonary:      Effort: Pulmonary effort is normal. No tachypnea, accessory muscle usage or respiratory distress.      Comments: Lung sounds absent on the right.  Coarse lung sounds throughout left lung fields.  Abdominal:      General: Abdomen is flat. Bowel sounds are normal. There is no distension.      Palpations: Abdomen is soft.      Tenderness: There is no abdominal tenderness.   Musculoskeletal:         General: Normal range of motion.      Cervical back: Normal range of motion and neck supple. No tenderness.      Right lower leg: No edema.      Left lower leg: No edema.      Comments: No calf erythema, warmth, swelling, or tenderness to palpation bilaterally.    Lymphadenopathy:      Cervical: No cervical adenopathy.   Skin:     General: Skin is warm and dry.      Capillary Refill: Capillary refill takes less than 2 seconds.      Coloration: Skin is not pale.      Findings: No rash.   Neurological:      General: No focal deficit present.      Mental Status: She is alert.   Psychiatric:         Mood and Affect: Mood normal.         Behavior: Behavior normal.         Results Reviewed       Procedure Component Value Units Date/Time    Blood culture #1 [[id number]] Collected: 01/19/25 1249    Lab Status: Final result Specimen: Blood from Arm, Right Updated: 01/25/25 0901     Blood Culture No Growth After 5 Days.    Blood culture #2 [[id number]] Collected: 01/19/25 1254    Lab Status: Final result Specimen: Blood from Hand, Right Updated: 01/25/25 0901     Blood Culture No Growth After 5 Days.    CMV DNA, QUANTITATIVE PCR [[id number]]  (Abnormal) Collected: 01/21/25 0508    Lab Status: Final result Specimen: Central  Venous Line Updated: 01/22/25 1410    Narrative:      The following orders were created for panel order CMV DNA, QUANTITATIVE PCR.  Procedure                               Abnormality         Status                     ---------                               -----------         ------                     CMV DNA, Quantitative PCR[[id number]]    Abnormal            Final result                 Please view results for these tests on the individual orders.    Sputum culture and Gram stain [[id number]]  (Abnormal) Collected: 01/19/25 1556    Lab Status: Final result Specimen: Expectorated Sputum Updated: 01/22/25 1241     Sputum Culture 3+ Growth of Beta Hemolytic Streptococcus Group B      4+ Growth of Candida sp. presumptively albicans     Gram Stain Result 2+ Epithelial cells per low power field      2+ Polys      2+ Gram positive cocci in pairs      2+ Budding yeast    Tacrolimus level [[id number]]  (Normal) Collected: 01/19/25 1249    Lab Status: Final result Specimen: Blood from Arm, Right Updated: 01/20/25 1731     TACROLIMUS 14.1 ng/mL     Respiratory Panel 2.1(RP2)with COVID19 [[id number]]  (Normal) Collected: 01/19/25 1555    Lab Status: Final result Specimen: Nasopharyngeal Swab Updated: 01/20/25 1311     Adenovirus Not Detected     Bordetella parapertussis Not Detected     Bordetella pertussis Not Detected     Chlamydia pneumoniae Not Detected     SARS-CoV-2 Not Detected     Coronavirus 229E Not Detected     Coronavirus HKU1 Not Detected     Coronavirus NL63 Not Detected     Coronavirus OC43 Not Detected     Human Metapneumovirus Not Detected     Rhino/Enterovirus Not Detected     Influenza A Not Detected     Influenza B Not Detected     Mycoplasma pneumoniae Not Detected     Parainfluenza 1 Not Detected     Parainfluenza 2 Not Detected     Parainfluenza 3 Not Detected     Parainfluenza 4 Not Detected     Respiratory Syncytial Virus Not Detected    Fingerstick Glucose (POCT) [[id number]]  (Abnormal) Collected:  01/20/25 1219    Lab Status: Final result Specimen: Blood Updated: 01/20/25 1222     POC Glucose 206 mg/dl     Fingerstick Glucose (POCT) [[id number]]  (Normal) Collected: 01/20/25 0730    Lab Status: Final result Specimen: Blood Updated: 01/20/25 0731     POC Glucose 122 mg/dl     Basic metabolic panel [[id number]]  (Abnormal) Collected: 01/20/25 0442    Lab Status: Final result Specimen: Blood from Line Updated: 01/20/25 0543     Sodium 135 mmol/L      Potassium 4.8 mmol/L      Chloride 98 mmol/L      CO2 32 mmol/L      ANION GAP 5 mmol/L      BUN 28 mg/dL      Creatinine 1.05 mg/dL      Glucose 122 mg/dL      Calcium 8.5 mg/dL      eGFR 63 ml/min/1.73sq m     Narrative:      National Kid[employer] guidelines for Chronic Kidney Disease (CKD):     Stage 1 with normal or high GFR (GFR > 90 mL/min/1.73 square meters)    Stage 2 Mild CKD (GFR = 60-89 mL/min/1.73 square meters)    Stage 3A Moderate CKD (GFR = 45-59 mL/min/1.73 square meters)    Stage 3B Moderate CKD (GFR = 30-44 mL/min/1.73 square meters)    Stage 4 Severe CKD (GFR = 15-29 mL/min/1.73 square meters)    Stage 5 End Stage CKD (GFR <15 mL/min/1.73 square meters)  Note: GFR calculation is accurate only with a steady state creatinine    Procalcitonin, Next Day AM Collection [[id number]]  (Normal) Collected: 01/20/25 0442    Lab Status: Final result Specimen: Blood from Line Updated: 01/20/25 0517     Procalcitonin <0.05 ng/ml     CBC (With Platelets) [[id number]]  (Abnormal) Collected: 01/20/25 0442    Lab Status: Final result Specimen: Blood from Line Updated: 01/20/25 0456     WBC 6.47 Thousand/uL      RBC 3.46 Million/uL      Hemoglobin 7.8 g/dL      Hematocrit 29.2 %      MCV 84 fL      MCH 22.5 pg      MCHC 26.7 g/dL      RDW 18.7 %      Platelets 145 Thousands/uL      MPV 10.3 fL     Fingerstick Glucose (POCT) [[id number]]  (Abnormal) Collected: 01/19/25 2104    Lab Status: Final result Specimen: Blood Updated: 01/19/25 2105     POC Glucose 236  mg/dl     Procalcitonin [[id number]]  (Normal) Collected: 01/19/25 1249    Lab Status: Final result Specimen: Blood from Arm, Right Updated: 01/19/25 1907     Procalcitonin 0.06 ng/ml     Lactic acid 2 Hours [[id number]]  (Abnormal) Collected: 01/19/25 1555    Lab Status: Final result Specimen: Blood from Central Venous Line Updated: 01/19/25 1634     LACTIC ACID 2.5 mmol/L     Narrative:      Result may be elevated if tourniquet was used during collection.    HS Troponin I 2hr [[id number]]  (Normal) Collected: 01/19/25 1501    Lab Status: Final result Specimen: Blood from Central Venous Line Updated: 01/19/25 1544     hs TnI 2hr 10 ng/L      Delta 2hr hsTnI -1 ng/L     FLU/RSV/COVID - if FLU/RSV clinically relevant (2hr TAT) [[id number]]  (Normal) Collected: 01/19/25 1249    Lab Status: Final result Specimen: Nares from Nose Updated: 01/19/25 1353     SARS-CoV-2 Negative     INFLUENZA A PCR Negative     INFLUENZA B PCR Negative     RSV PCR Negative    Narrative:      This test has been performed using the CoV-2/Flu/RSV plus assay on the [employer] GeneXpert platform. This test has been validated by the  and verified by the performing laboratory.     This test is designed to amplify and detect the following: nucleocapsid (N), envelope (E), and RNA-dependent RNA polymerase (RdRP) genes of the SARS-CoV-2 genome; matrix (M), basic polymerase (PB2), and acidic protein (PA) segments of the influenza A genome; matrix (M) and non-structural protein (NS) segments of the influenza B genome, and the nucleocapsid genes of RSV A and RSV B.     Positive results are indicative of the presence of Flu A, Flu B, RSV, and/or SARS-CoV-2 RNA. Positive results for SARS-CoV-2 or suspected novel influenza should be reported to state, local, or federal health departments according to local reporting requirements.      All results should be assessed in conjunction with clinical presentation and other laboratory markers for clinical  management.     FOR PEDIATRIC PATIENTS - copy/paste COVID Guidelines URL to browser: [email or web address]       HS Troponin 0hr (reflex protocol) [[id number]]  (Normal) Collected: 01/19/25 1249    Lab Status: Final result Specimen: Blood from Arm, Right Updated: 01/19/25 1343     hs TnI 0hr 11 ng/L     D-Dimer [[id number]]  (Abnormal) Collected: 01/19/25 1249    Lab Status: Final result Specimen: Blood from Arm, Right Updated: 01/19/25 1336     D-Dimer, Quant 1.12 ug/ml FEU     Comprehensive metabolic panel [[id number]]  (Abnormal) Collected: 01/19/25 1249    Lab Status: Final result Specimen: Blood from Arm, Right Updated: 01/19/25 1336     Sodium 140 mmol/L      Potassium 4.4 mmol/L      Chloride 94 mmol/L      CO2 39 mmol/L      ANION GAP 7 mmol/L      BUN 38 mg/dL      Creatinine 1.33 mg/dL      Glucose 181 mg/dL      Calcium 8.2 mg/dL      AST 17 U/L      ALT 23 U/L      Alkaline Phosphatase 70 U/L      Total Protein 6.4 g/dL      Albumin 3.5 g/dL      Total Bilirubin 0.40 mg/dL      eGFR 47 ml/min/1.73sq m     Narrative:      National Kidney Disease Foundation guidelines for Chronic Kidney Disease (CKD):     Stage 1 with normal or high GFR (GFR > 90 mL/min/1.73 square meters)    Stage 2 Mild CKD (GFR = 60-89 mL/min/1.73 square meters)    Stage 3A Moderate CKD (GFR = 45-59 mL/min/1.73 square meters)    Stage 3B Moderate CKD (GFR = 30-44 mL/min/1.73 square meters)    Stage 4 Severe CKD (GFR = 15-29 mL/min/1.73 square meters)    Stage 5 End Stage CKD (GFR <15 mL/min/1.73 square meters)  Note: GFR calculation is accurate only with a steady state creatinine    Protime-INR [[id number]]  (Abnormal) Collected: 01/19/25 1249    Lab Status: Final result Specimen: Blood from Arm, Right Updated: 01/19/25 1336     Protime 15.5 seconds      INR 1.16    Narrative:      INR Therapeutic Range    Indication                                             INR Range      Atrial  Fibrillation                                               2.0-3.0  Hypercoagulable State                                    2.0.2.3  Left Ventricular Asist Device                            2.0-3.0  Mechanical Heart Valve                                  -    Aortic(with afib, MI, embolism, HF, LA enlargement,    and/or coagulopathy)                                     2.0-3.0 (2.5-3.5)     Mitral                                                             2.5-3.5  Prosthetic/Bioprosthetic Heart Valve               2.0-3.0  Venous thromboembolism (VTE: VT, PE        2.0-3.0    APTT [[id number]]  (Abnormal) Collected: 01/19/25 1249    Lab Status: Final result Specimen: Blood from Arm, Right Updated: 01/19/25 1336     PTT 46 seconds     Lactic acid, plasma (w/reflex if result > 2.0) [[id number]]  (Abnormal) Collected: 01/19/25 1249    Lab Status: Final result Specimen: Blood from Arm, Right Updated: 01/19/25 1336     LACTIC ACID 2.7 mmol/L     Narrative:      Result may be elevated if tourniquet was used during collection.    CBC and differential [[id number]]  (Abnormal) Collected: 01/19/25 1249    Lab Status: Final result Specimen: Blood from Arm, Right Updated: 01/19/25 1317     WBC 7.53 Thousand/uL      RBC 3.74 Million/uL      Hemoglobin 8.4 g/dL      Hematocrit 30.5 %      MCV 82 fL      MCH 22.5 pg      MCHC 27.5 g/dL      RDW 18.3 %      MPV 10.6 fL      Platelets 157 Thousands/uL      nRBC 0 /100 WBCs      Segmented % 76 %      Immature Grans % 1 %      Lymphocytes % 11 %      Monocytes % 11 %      Eosinophils Relative 1 %      Basophils Relative 0 %      Absolute Neutrophils 5.67 Thousands/µL      Absolute Immature Grans 0.07 Thousand/uL      Absolute Lymphocytes 0.85 Thousands/µL      Absolute Monocytes 0.85 Thousand/µL      Eosinophils Absolute 0.07 Thousand/µL      Basophils Absolute 0.02 Thousands/µL             CTA chest pe study   Final Interpretation by Jp May MD (01/19 0897)      No pulmonary  embolism.      Left lung transplant demonstrating compensatory hyperinflation and diffuse tree-in-bud nodularity consistent with infectious/inflammatory etiology similar to prior study.      Status post right pneumonectomy with shifting of the mediastinum to the right and dilated esophagus likely related to resultant mass effect at the GE junction.            Workstation performed: [id number]         XR chest 1 view portable   Final Interpretation by Miguel Ángel Toth MD ([id number])      No acute cardiopulmonary disease status post right pneumonectomy.            Workstation performed: [id number]             ECG 12 Lead Documentation Only    Date/Time: 2025 11:58 AM    Performed by: Crystal Sanabria DO  Authorized by: Crystal Sanabria DO    Indications / Diagnosis:  Chest pain  ECG reviewed by me, the ED Provider: yes    Patient location:  ED  Interpretation:     Interpretation: normal    Rate:     ECG rate:  83    ECG rate assessment: normal    Rhythm:     Rhythm: sinus rhythm    Ectopy:     Ectopy: none    QRS:     QRS axis:  Normal    QRS intervals:  Normal  Conduction:     Conduction: normal    ST segments:     ST segments:  Normal  T waves:     T waves: normal        ED Medication and Procedure Management   Prior to Admission Medications   Prescriptions Last Dose Informant Patient Reported? Taking?   ALPRAZolam (XANAX) 1 mg tablet 2025  No Yes   Sig: Take 1 tablet (1 mg total) by mouth daily at bedtime as needed for anxiety or sleep   Ascorbic Acid (vitamin C) 100 MG tablet 2025 Morning Self Yes Yes   Sig: Take 100 mg by mouth daily   BD Pen Needle Abi U/F 32G X 4 MM MISC 2025 Self No Yes   Si times daily   Cholecalciferol (Vitamin D3) 125 MCG (5000 UT) TABS 2025 Morning Self Yes Yes   Sig: Take 5,000 Units by mouth daily   Continuous Blood Gluc  (Dexcom G7 ) SUKHI 2025 Morning Self No Yes   Sig: Use 1 each continuous   Continuous Glucose  Sensor (Dexcom G7 Sensor) 1/19/2025  No Yes   Sig: Use 1 Device every 10 days   Insulin Disposable Pump (Omnipod 5 G6 Intro, Gen 5,) KIT Unknown Self No No   Sig: Use 1 Units continuous   Insulin Disposable Pump (Omnipod 5 G6 Pods, Gen 5,) MISC Unknown Self No No   Sig: Use 1 Units every other day   Insulin Pen Needle (Comfort EZ Pen Needles) 33G X 4 MM MISC 1/19/2025  No Yes   Sig: Use to inject insulin 4 times a day   Respiratory Therapy Supplies (Nebulizer) SUKHI Not Taking Self Yes No   Sig: Please dispense nebulizer machine   Patient not taking: Reported on 1/20/2025   Turmeric 500 MG CAPS Past Week Self Yes Yes   Sig: Take by mouth   acetaminophen (TYLENOL) 325 mg tablet 1/19/2025 Morning Self No Yes   Sig: Take 3 tablets (975 mg total) by mouth every 8 (eight) hours   albuterol (2.5 mg/3 mL) 0.083 % nebulizer solution 1/19/2025 Self No Yes   Sig: Take 6 mL (5 mg total) by nebulization 2 (two) times a day   albuterol (PROVENTIL HFA,VENTOLIN HFA) 90 mcg/act inhaler 1/19/2025 Self Yes Yes   Sig: INHALE 2 PUFFS EVERY 8 HOURS AS NEEDED FOR SHORTNESS OF BREATH OR WHEEZING.   apixaban (ELIQUIS) 5 mg 1/19/2025 Morning Self No Yes   Sig: Take 1 tablet (5 mg total) by mouth 2 (two) times a day   atovaquone (MEPRON) 750 mg/5 mL suspension 1/19/2025 Morning Self Yes Yes   Sig: Take 1,500 mg by mouth daily   azelastine (OPTIVAR) 0.05 % ophthalmic solution   No No   Sig: INSTILL 1 DROP INTO BOTH EYES TWICE A DAY   azithromycin (ZITHROMAX) 250 mg tablet Past Week Self Yes Yes   Sig: Take 250 mg by mouth 3 (three) times a week   bisacodyl (DULCOLAX) 5 mg EC tablet Past Week Self No Yes   Sig: Take 1 tablet (5 mg total) by mouth daily at bedtime   budesonide-formoterol (SYMBICORT) 160-4.5 mcg/act inhaler 1/19/2025 Morning Self Yes Yes   Sig: Inhale 2 puffs every 12 (twelve) hours   cyclobenzaprine (FLEXERIL) 5 mg tablet   No No   Sig: Take 1 tablet (5 mg total) by mouth 3 (three) times a day as needed for muscle spasms for up  to 14 days   dexlansoprazole (DEXILANT) 60 MG capsule 1/18/2025 Bedtime Self No Yes   Sig: TAKE 1 CAPSULE BY MOUTH EVERY DAY IN THE MORNING   dicyclomine (BENTYL) 10 mg capsule Not Taking  No No   Sig: Take 2 capsules (20 mg total) by mouth 4 (four) times a day (before meals and at bedtime)   Patient not taking: Reported on 1/19/2025   dronabinol (MARINOL) 5 MG capsule Past Week Self No Yes   Sig: Take 1 capsule (5 mg total) by mouth 3 (three) times a day   famotidine (PEPCID) 40 MG tablet 1/18/2025 Bedtime  No Yes   Sig: TAKE 1 TABLET BY MOUTH EVERYDAY AT BEDTIME   furosemide (LASIX) 20 mg tablet 1/19/2025 Morning Self Yes Yes   Sig: Take 40 mg by mouth daily   insulin aspart (NovoLOG FlexPen) 100 UNIT/ML injection pen 1/19/2025 Morning  No Yes   Sig: INJECT 10 UNITS pre-BREAKFAST, 8 UNITS pre-LUNCH and 12 UNITS pre-DINNER plus scale; max 50 UNITS/day   insulin degludec (Tresiba FlexTouch) 100 units/mL injection pen 1/19/2025 Morning  No Yes   Sig: Inject 12 Units under the skin daily   magnesium gluconate (MAGONATE) 500 mg tablet 1/19/2025  Yes Yes   Sig: Take 500 mg by mouth 2 (two) times a day   naloxone (NARCAN) 4 mg/0.1 mL nasal spray Unknown  No No   Sig: Administer 1 spray into a nostril. If no response after 2-3 minutes, give another dose in the other nostril using a new spray.   ondansetron (ZOFRAN) 4 mg tablet Past Week Self Yes Yes   Sig: Take 4 mg by mouth every 8 (eight) hours as needed for nausea   oxyCODONE (ROXICODONE) 10 MG TABS 1/19/2025  No Yes   Sig: Take 1 tablet (10 mg total) by mouth every 4 (four) hours as needed for moderate pain or severe pain Max Daily Amount: 60 mg   oxybutynin (DITROPAN XL) 15 MG 24 hr tablet 1/19/2025 Self No Yes   Sig: Take 1 tablet (15 mg total) by mouth daily at bedtime   pancrelipase, Lip-Prot-Amyl, (CREON) 24,000 units 1/20/2025 Self Yes Yes   Sig: Take 48,000 Units by mouth 3 (three) times a day with meals   pantoprazole (PROTONIX) 40 mg tablet   No No   Sig:  Take 1 tablet (40 mg total) by mouth 2 (two) times a day before meals   polyethylene glycol (MIRALAX) 17 g packet Past Week Self No Yes   Sig: Take 17 g by mouth 2 (two) times a day   predniSONE 10 mg tablet 1/19/2025 Morning Self Yes Yes   Sig: Take 20 mg by mouth daily   pregabalin (LYRICA) 150 mg capsule 1/19/2025 Morning Self No Yes   Sig: Take 1 capsule (150 mg total) by mouth 2 times a day   promethazine-codeine (PHENERGAN WITH CODEINE) 6.25-10 mg/5 mL syrup Past Month  No Yes   Sig: Take 2.5 mL by mouth every 6 (six) hours as needed for cough   tacrolimus (PROGRAF) 1 mg capsule 1/19/2025 Morning Self No Yes   Sig: Take 2 capsules (2 mg total) by mouth 2 (two) times a day   Patient taking differently: Take 3 mg by mouth 2 (two) times a day   valACYclovir (VALTREX) 500 mg tablet 1/20/2025 Self Yes Yes   Sig: Take 500 mg by mouth daily   vitamin B-12 (VITAMIN B-12) 1,000 mcg tablet 1/19/2025 Morning Self Yes Yes   Sig: Take by mouth daily      Facility-Administered Medications: None     Current Discharge Medication List        START taking these medications    Details   azelastine (OPTIVAR) 0.05 % ophthalmic solution INSTILL 1 DROP INTO BOTH EYES TWICE A DAY  Qty: 18 mL, Refills: 1    Associated Diagnoses: Bacterial conjunctivitis           CONTINUE these medications which have NOT CHANGED    Details   acetaminophen (TYLENOL) 325 mg tablet Take 3 tablets (975 mg total) by mouth every 8 (eight) hours  Refills: 0    Associated Diagnoses: Chest pain, unspecified type      albuterol (2.5 mg/3 mL) 0.083 % nebulizer solution Take 6 mL (5 mg total) by nebulization 2 (two) times a day  Refills: 0    Associated Diagnoses: Lung transplant status, bilateral (HCC)      albuterol (PROVENTIL HFA,VENTOLIN HFA) 90 mcg/act inhaler INHALE 2 PUFFS EVERY 8 HOURS AS NEEDED FOR SHORTNESS OF BREATH OR WHEEZING.      ALPRAZolam (XANAX) 1 mg tablet Take 1 tablet (1 mg total) by mouth daily at bedtime as needed for anxiety or sleep  Qty:  30 tablet, Refills: 2    Associated Diagnoses: Cystic fibrosis (HCC)      apixaban (ELIQUIS) 5 mg Take 1 tablet (5 mg total) by mouth 2 (two) times a day  Qty: 30 tablet, Refills: 0    Associated Diagnoses: Acute pulmonary embolism, unspecified pulmonary embolism type, unspecified whether acute cor pulmonale present ([facility])      Ascorbic Acid (vitamin C) 100 MG tablet Take 100 mg by mouth daily      atovaquone (MEPRON) 750 mg/5 mL suspension Take 1,500 mg by mouth daily      azithromycin (ZITHROMAX) 250 mg tablet Take 250 mg by mouth 3 (three) times a week      !! BD Pen Needle Abi U/F 32G X 4 MM MISC 4 times daily  Qty: 400 each, Refills: 1    Associated Diagnoses: Type 1 diabetes mellitus with diabetic chronic kidney disease, unspecified CKD stage ([facility])      bisacodyl (DULCOLAX) 5 mg EC tablet Take 1 tablet (5 mg total) by mouth daily at bedtime  Qty: 30 tablet, Refills: 0    Associated Diagnoses: Drug-induced constipation      budesonide-formoterol (SYMBICORT) 160-4.5 mcg/act inhaler Inhale 2 puffs every 12 (twelve) hours      Cholecalciferol (Vitamin D3) 125 MCG (5000 UT) TABS Take 5,000 Units by mouth daily      Continuous Blood Gluc  (Dexcom G7 ) SUKHI Use 1 each continuous  Qty: 1 each, Refills: 0    Associated Diagnoses: Type 1 diabetes mellitus with diabetic chronic kidney disease, unspecified CKD stage ([facility])      Continuous Glucose Sensor (Dexcom G7 Sensor) Use 1 Device every 10 days  Qty: 3 each, Refills: 5    Associated Diagnoses: Type 1 diabetes mellitus with diabetic chronic kidney disease, unspecified CKD stage ([facility])      dexlansoprazole (DEXILANT) 60 MG capsule TAKE 1 CAPSULE BY MOUTH EVERY DAY IN THE MORNING  Qty: 100 capsule, Refills: 1    Associated Diagnoses: Gastroesophageal reflux disease with esophagitis without hemorrhage      dronabinol (MARINOL) 5 MG capsule Take 1 capsule (5 mg total) by mouth 3 (three) times a day  Qty: 90 capsule, Refills: 0    Associated Diagnoses: Other  disorders of lung      famotidine (PEPCID) 40 MG tablet TAKE 1 TABLET BY MOUTH EVERYDAY AT BEDTIME  Qty: 90 tablet, Refills: 1    Associated Diagnoses: Gastroesophageal reflux disease with esophagitis without hemorrhage      furosemide (LASIX) 20 mg tablet Take 40 mg by mouth daily      insulin aspart (NovoLOG FlexPen) 100 UNIT/ML injection pen INJECT 10 UNITS pre-BREAKFAST, 8 UNITS pre-LUNCH and 12 UNITS pre-DINNER plus scale; max 50 UNITS/day  Qty: 45 mL, Refills: 1    Associated Diagnoses: Type 1 diabetes mellitus with diabetic chronic kidney disease, unspecified CKD stage ([facility])      insulin degludec (Tresiba FlexTouch) 100 units/mL injection pen Inject 12 Units under the skin daily  Qty: 15 mL, Refills: 1    Associated Diagnoses: Type 1 diabetes mellitus with diabetic chronic kidney disease, unspecified CKD stage ([facility])      !! Insulin Pen Needle (Comfort EZ Pen Needles) 33G X 4 MM MISC Use to inject insulin 4 times a day  Qty: 100 each, Refills: 3    Associated Diagnoses: Type 1 diabetes mellitus with diabetic chronic kidney disease, unspecified CKD stage ([facility])      magnesium gluconate (MAGONATE) 500 mg tablet Take 500 mg by mouth 2 (two) times a day      ondansetron (ZOFRAN) 4 mg tablet Take 4 mg by mouth every 8 (eight) hours as needed for nausea      oxybutynin (DITROPAN XL) 15 MG 24 hr tablet Take 1 tablet (15 mg total) by mouth daily at bedtime  Qty: 90 tablet, Refills: 3    Associated Diagnoses: Urine frequency      oxyCODONE (ROXICODONE) 10 MG TABS Take 1 tablet (10 mg total) by mouth every 4 (four) hours as needed for moderate pain or severe pain Max Daily Amount: 60 mg  Qty: 180 tablet, Refills: 0    Associated Diagnoses: Cystic fibrosis (HCC); Continuous opioid dependence ([facility])      pancrelipase, Lip-Prot-Amyl, (CREON) 24,000 units Take 48,000 Units by mouth 3 (three) times a day with meals      polyethylene glycol (MIRALAX) 17 g packet Take 17 g by mouth 2 (two) times a day  Refills: 0    Associated  Diagnoses: Drug-induced constipation      predniSONE 10 mg tablet Take 20 mg by mouth daily      pregabalin (LYRICA) 150 mg capsule Take 1 capsule (150 mg total) by mouth 2 times a day  Qty: 180 capsule, Refills: 0    Associated Diagnoses: Right foot pain; Chronic pain syndrome; Lumbar radiculopathy      promethazine-codeine (PHENERGAN WITH CODEINE) 6.25-10 mg/5 mL syrup Take 2.5 mL by mouth every 6 (six) hours as needed for cough  Qty: 118 mL, Refills: 0    Associated Diagnoses: Cystic fibrosis (HCC); Chronic cough      tacrolimus (PROGRAF) 1 mg capsule Take 2 capsules (2 mg total) by mouth 2 (two) times a day    Associated Diagnoses: Hx of lung transplant ([facility])      Turmeric 500 MG CAPS Take by mouth      valACYclovir (VALTREX) 500 mg tablet Take 500 mg by mouth daily      vitamin B-12 (VITAMIN B-12) 1,000 mcg tablet Take by mouth daily      cyclobenzaprine (FLEXERIL) 5 mg tablet Take 1 tablet (5 mg total) by mouth 3 (three) times a day as needed for muscle spasms for up to 14 days  Qty: 24 tablet, Refills: 0    Associated Diagnoses: Chronic pain disorder      dicyclomine (BENTYL) 10 mg capsule Take 2 capsules (20 mg total) by mouth 4 (four) times a day (before meals and at bedtime)  Qty: 240 capsule, Refills: 0    Associated Diagnoses: Gastroesophageal reflux disease with esophagitis without hemorrhage      Insulin Disposable Pump (Omnipod 5 G6 Intro, Gen 5,) KIT Use 1 Units continuous  Qty: 1 kit, Refills: 0    Associated Diagnoses: Type 1 diabetes mellitus with diabetic chronic kidney disease, unspecified CKD stage ([facility])      Insulin Disposable Pump (Omnipod 5 G6 Pods, Gen 5,) MISC Use 1 Units every other day  Qty: 15 each, Refills: 3    Associated Diagnoses: Type 1 diabetes mellitus with diabetic chronic kidney disease, unspecified CKD stage (HCC)      naloxone (NARCAN) 4 mg/0.1 mL nasal spray Administer 1 spray into a nostril. If no response after 2-3 minutes, give another dose in the other nostril using a  new spray.  Qty: 1 each, Refills: 1    Associated Diagnoses: Continuous opioid dependence (HCC)      pantoprazole (PROTONIX) 40 mg tablet Take 1 tablet (40 mg total) by mouth 2 (two) times a day before meals  Qty: 60 tablet, Refills: 0    Associated Diagnoses: Gastroesophageal reflux disease with esophagitis without hemorrhage      Respiratory Therapy Supplies (Nebulizer) SUKHI Please dispense nebulizer machine       !! - Potential duplicate medications found. Please discuss with provider.        No discharge procedures on file.  ED SEPSIS DOCUMENTATION   Time reflects when diagnosis was documented in both MDM as applicable and the Disposition within this note       Time User Action Codes Description Comment    1/19/2025  3:49 PM Crystal Sanabria Add [R07.9] Chest pain, unspecified     1/19/2025  3:49 PM Crystal Sanabria Remove [R07.9] Chest pain, unspecified     1/19/2025  3:49 PM Crystal Sanabria Add [R07.81] Pleuritic chest pain     1/19/2025  3:49 PM Crystal Sanabria Add [R06.00] Dyspnea     1/19/2025  3:50 PM Crystal Sanabria Add [R05.8] Productive cough     1/19/2025  3:50 PM Crystal Sanabria Modify [R07.81] Pleuritic chest pain     1/19/2025  3:50 PM Crystal Sanabria Modify [R06.00] Dyspnea     1/19/2025  6:00 PM SchAnna johnson Add [E84.9] Cystic fibrosis (HCC)     1/19/2025  6:00 PM LindauAnna Add [Z94.2] Hx of lung transplant (HCC)            Initial Sepsis Screening       Row Name 01/19/25 1526                Is the patient's history suggestive of a new or worsening infection? Yes (Proceed)  -AW        Suspected source of infection pneumonia  -AW        Indicate SIRS criteria Leukocytosis (WBC > 69688 IJL) OR Leukopenia (WBC <4000 IJL) OR Bandemia (WBC >10% bands)  -AW        Are two or more of the above signs & symptoms of infection both present and new to the patient? No  -AW                  User Key  (r) = Recorded By, (t) = Taken By, (c) = Cosigned By      Initials Name Provider Type    JUAN LUIS Rojas  Leanna Sanabria, DO Physician                       Crystal Sanabria,   01/28/25 1200

## 2025-01-28 NOTE — ASSESSMENT & PLAN NOTE
Patient presentation due to sore throat, persistent cough, shortness of breath, wheezing, pleuritic chest pain starting since Friday 1/17.  Patient reports the symptoms are getting worse despite usual treatment with mucinex, nebulizers, OTC cough meds  CT chest appears similar to Nov '24 - no definitive infiltrate, but notes inflammatory tree-in-bud appearance  ID consulted for guidance due to transplant status and recurrence of symptoms  Procalcitonin normal x2 (was elevated Nov '24)  FLU/COVID/RSV/RP2 all negative   Continue respiratory protocol, supplemental O2 prn  Mucinex, albuterol, hycodan prn  1/24:   Continue hypertonic saline + albuterol nebs  Patient will consider higher steroid taper, had wanted to avoid earlier due to hyperglycemia, but given the arrest in her improvement, would be open to it if the saline nebs do not help    1/25  She would like to hold off on higher steroids, cont saline nebs hopefully times with pain medication to allow administration of saline  Increase insulin lunch/dinner/lantus  1/26-1/27  Initially on prednisone 20 mg discussed with YAYA Mcdonnell transplant coordinator recommend patient going up to 50 mg daily they can titrate her in the outpatient setting this was started on 1/27 1/28 patient requested discharge home

## 2025-01-28 NOTE — DISCHARGE SUMMARY
[facility]  Discharge Summary  Name: Maximilian Mann I MRN: [id number]  Unit/Bed#: -01I Date of Admission: 1/19/2025   Date of Service: 1/19/2025  I Hospital Day: 9    * Cough  Assessment & Plan  Patient presentation due to sore throat, persistent cough, shortness of breath, wheezing, pleuritic chest pain starting since Friday 1/17.  Patient reports the symptoms are getting worse despite usual treatment with mucinex, nebulizers, OTC cough meds  CT chest appears similar to Nov '24 - no definitive infiltrate, but notes inflammatory tree-in-bud appearance  ID consulted for guidance due to transplant status and recurrence of symptoms  Procalcitonin normal x2 (was elevated Nov '24)  FLU/COVID/RSV/RP2 all negative   Continue respiratory protocol, supplemental O2 prn  Mucinex, albuterol, hycodan prn  1/24:   Continue hypertonic saline + albuterol nebs  Patient will consider higher steroid taper, had wanted to avoid earlier due to hyperglycemia, but given the arrest in her improvement, would be open to it if the saline nebs do not help    1/25  She would like to hold off on higher steroids, cont saline nebs hopefully times with pain medication to allow administration of saline  Increase insulin lunch/dinner/lantus  1/26-1/27  Initially on prednisone 20 mg discussed with YAYA Mcdonnell transplant coordinator recommend patient going up to 50 mg daily they can titrate her in the outpatient setting this was started on 1/27 1/28 patient requested discharge home    Pleuritic chest pain  Assessment & Plan  Pain uncontrolled, now better with long-acting regimen. Continue:  Morphine ER 15 mg TID  Oxycodone 5 mg q4h prn moderate pain  Oxycodone 10 mg q4h prn severe pain  Dilaudid 1 mg q4h prn breakthrough pain  Tylenol prn  Hycodan prn     SIRS (systemic inflammatory response syndrome) (HCC)  Assessment & Plan  Tachycardia, tachypnea. She remains afebrile and without leukocytosis or elevated  procalcitonin. RP2 negative.  ID consulted, no clear new infectious etiology  Sputum GBS and candida, appears to be colonized   Ceftriaxone x3 days, switched to amoxicillin through 1/25  Nystatin swish/swallow per patient request      History of DVT (deep vein thrombosis)  Assessment & Plan  Continue with Eliquis 5 mg twice a day.    Hx of lung transplant ([facility])  Assessment & Plan  Secondary to cystic fibrosis; Followed by YAYA Mcdonnell, s/p bilat lung transplant 2013, subsequent rejection and severe bronchiolitis obliterans   s/p second left lung transplant 2017 and right pneumonectomy  Has had recurrent admissions for pneumonia and Pseudomonas/MSSA   Patient currently taking tacrolimus 3 mg twice a day and prednisone 20 mg daily.    It was recommended by her transplant team high dose steroids however the patient was reluctant.    Patient takes for prophylaxis azithromycin 250 mg 3 times a week MWF, atovaquone 1500 milligrams daily for PJP prophylaxis and Valtrex 500 mg daily    GERD (gastroesophageal reflux disease)  Assessment & Plan  History of severe GERD status post fundoplication in 2023   Patient currently taking dexlansoprazole 60 mg daily and famotidine 40 mg daily at bedtime  Continue formulary sub PPI + famotidine     GEOFF (acute kidney injury) ([facility])  Assessment & Plan  Cr 1.3 on admission, baseline Cr 0.8-1.1  Suspected prerenal due to possible acute viral illness?   Cr improved with IV fluids, will hold on further due to patient swelling/fluid retention  Given lasix 1/23 but held due to creatinine bump  Lasix resumed 1/25, trend BMP which continues to be stable     Pancreatic insufficiency  Assessment & Plan  Continue Creon    Type 1 diabetes mellitus with diabetic chronic kidney disease, unspecified CKD stage ([facility])  Assessment & Plan  Blood Sugar Average: Last 72 hrs:(P) [id number]  Uncontrolled evident by A1c 7.8 from 12/16/2024.  Improved control with Lantus to 16 units and increasing Humalog 2  units with lunch and dinner    Sliding scale coverage  Hypoglycemia protocol  Diabetic diet  Increased insulin in setting of higher steroid use  Increase Lantus to 20 units from 16 further increased breakfast and lunchtime insulin patient advised to continue higher levels of insulin while on higher doses of steroids and can downtrend as steroids are tapered        Medical Problems       Resolved Problems  Date Reviewed: 12/19/2024          Resolved    Lactic acidosis 1/28/2025     Resolved by  WAYNE Ball        Discharging Physician / Practitioner: WAYNE Ball  PCP: Solis Avila MD  Admission Date:   Admission Orders (From admission, onward)       Ordered        01/19/25 1718  INPATIENT ADMISSION  Once                          Discharge Date: 01/28/25    Consultations During Hospital Stay:  IP CONSULT TO INFECTIOUS DISEASES    Procedures Performed:   None    Significant Findings / Test Results:   No results found.  None    Incidental Findings:   None      Test Results Pending at Discharge (will require follow up):   None     Outpatient Tests Requested:  Per [facility] transplant team    Complications: Persistent cough    Reason for Admission: Cough and patient with known transplant rejection chronic    Hospital Course:   Maximilian Mann is a 49 y.o. female patient with  has a past medical history of Abnormal uterine bleeding (AUB) (7/7/2022), ABPA (allergic bronchopulmonary aspergillosis) (HCC) (07/08/2012), Acute kidney injury (HCC) (09/08/2017), Acute on chronic respiratory failure (HCC) (03/04/2017), Anaphylaxis (06/06/2022), Anemia, Asthma, Chest pain (09/21/2021), Chronic pain, Chronic respiratory failure with hypoxia ([facility]) (09/07/2021), COVID-19 (06/03/2022), Cystic fibrosis ([facility]), Deep vein thrombosis ([facility]), Diabetes mellitus ([facility]), Fever (09/22/2024), GERD (gastroesophageal reflux disease), HBP (high blood pressure), HCAP (healthcare-associated pneumonia) (03/06/2020), History of  "transfusion, Influenza B (02/23/2020), Kidney stone, Moderate protein-calorie malnutrition (HCC) (01/08/2022), Neutropenic fever  (HCC) (09/26/2020), Pneumonia due to Pseudomonas species (HCC) (10/28/2010), PONV (postoperative nausea and vomiting), Pseudomonas aeruginosa infection (09/20/2012), Renal calculus, Squamous cell skin cancer (01/12/2023), Transplant recipient, Ulcerative colitis (HCC), and Wellness examination (02/04/2013). who originally presented to the hospital on 1/19/2025 due to Chest Pain (Pt c/o chest pain \"all over\" and increased SOB x 3 days, lung transplant pt).  Complaining of cough and chest pain discomfort patient admitted will need to increase steroids by 5 mg for 15 to 20 mg however did not want to do further was persisting complaints of pain.  A discussion was had with patient's [facility] transplant team who recommended increase of steroids initially which she refused however agreed with adding MS Contin they would work with their palliative team on changing her pain regimen upon her follow-up appointment on 1/30/2025.  Patient reports she would prefer to go home today have a day at home until she gets to her transplant appointment on Thursday.      Please see above list of diagnoses and related plan for additional information.     Condition at Discharge: stable    Discharge Day Visit / Exam:   Subjective:    Patient reports she really did prefer to go home as she states her cousin is here and can pick her up today overall feels she can manage her symptoms and further will reach out to her endocrinologist to adjust her insulin while she is on the highest dose of steroids.  And she will keep her outpatient follow-up with her transplant team on Thursday.  She had no other concerns at this time.  Vitals: Blood Pressure: 128/94 (01/28/25 0744)  Pulse: 95 (01/26/25 2205)  Temperature: 97.9 °F (36.6 °C) (01/28/25 0744)  Temp Source: Oral (01/27/25 1541)  Respirations: 16 (01/28/25 0045)  Height: " "5' 2\" (157.5 cm) (01/20/25 1519)  Weight - Scale: 60.3 kg (132 lb 15 oz) (01/20/25 1519)  SpO2: 94 % (01/27/25 1541)  Exam:   Physical Exam  Vitals and nursing note reviewed.   Constitutional:       General: She is not in acute distress.     Appearance: She is well-developed.   HENT:      Head: Normocephalic and atraumatic.   Eyes:      Conjunctiva/sclera: Conjunctivae normal.   Cardiovascular:      Rate and Rhythm: Normal rate and regular rhythm.      Heart sounds: No murmur heard.  Pulmonary:      Effort: Pulmonary effort is normal. No respiratory distress.      Breath sounds: Examination of the right-upper field reveals rhonchi. Examination of the right-middle field reveals rhonchi. Examination of the right-lower field reveals rhonchi. Rhonchi present.      Comments: Left-sided absent breath sounds.  Patient appears to be moving better air with increase of steroid dose  Abdominal:      Palpations: Abdomen is soft.      Tenderness: There is no abdominal tenderness.   Musculoskeletal:         General: No swelling.      Cervical back: Neck supple.   Skin:     General: Skin is warm and dry.      Capillary Refill: Capillary refill takes less than 2 seconds.   Neurological:      Mental Status: She is alert and oriented to person, place, and time.   Psychiatric:         Mood and Affect: Mood normal.          Discussion with Family: Updated  (cousin) at bedside.    Discharge instructions/Information to patient and family:   See after visit summary for information provided to patient and family.      Provisions for Follow-Up Care:  See after visit summary for information related to follow-up care and any pertinent home health orders.      Mobility at time of Discharge:   Basic Mobility Inpatient Raw Score: 24  JH-HLM Goal: 8: Walk 250 feet or more  JH-HLM Achieved: 7: Walk 25 feet or more  HLM Goal NOT achieved. Continue to encourage mobility in post discharge setting.     Disposition:   Home    Planned " Readmission: None     Discharge Statement:  I spent 45 minutes discharging the patient. This time was spent on the day of discharge. I had direct contact with the patient on the day of discharge. Greater than 50% of the total time was spent examining patient, answering all patient questions, arranging and discussing plan of care with patient as well as directly providing post-discharge instructions.  Additional time then spent on discharge activities.    Discharge Medications:  See after visit summary for reconciled discharge medications provided to patient and/or family.      **Please Note: This note may have been constructed using a voice recognition system**

## 2025-01-28 NOTE — ASSESSMENT & PLAN NOTE
Secondary to cystic fibrosis; Followed by YAYA Mcdonnell, s/p bilat lung transplant 2013, subsequent rejection and severe bronchiolitis obliterans   s/p second left lung transplant 2017 and right pneumonectomy  Has had recurrent admissions for pneumonia and Pseudomonas/MSSA   Patient currently taking tacrolimus 3 mg twice a day and prednisone 20 mg daily.    It was recommended by her transplant team high dose steroids however the patient was reluctant.    Patient takes for prophylaxis azithromycin 250 mg 3 times a week [facility], atovaquone 1500 milligrams daily for PJP prophylaxis and Valtrex 500 mg daily

## 2025-01-28 NOTE — ASSESSMENT & PLAN NOTE
Blood Sugar Average: Last 72 hrs:(P) [id number]  Uncontrolled evident by A1c 7.8 from 12/16/2024.  Improved control with Lantus to 16 units and increasing Humalog 2 units with lunch and dinner    Sliding scale coverage  Hypoglycemia protocol  Diabetic diet  Increased insulin in setting of higher steroid use  Increase Lantus to 20 units from 16 further increased breakfast and lunchtime insulin patient advised to continue higher levels of insulin while on higher doses of steroids and can downtrend as steroids are tapered

## 2025-01-29 ENCOUNTER — TELEPHONE (OUTPATIENT)
Dept: INTERNAL MEDICINE CLINIC | Facility: CLINIC | Age: 50
End: 2025-01-29

## 2025-01-31 ENCOUNTER — TELEPHONE (OUTPATIENT)
Dept: INTERNAL MEDICINE CLINIC | Facility: CLINIC | Age: 50
End: 2025-01-31

## 2025-01-31 NOTE — TELEPHONE ENCOUNTER
I called patient and spoke with her, she is currently admitted to [facility], she informed me that the Morphine was authorized so it is at her pharmacy and  was picking up before she gets discharged. Currently is not feeling well but knows they will take care of her at [facility]. She will keep us updated as to when she gets home.She wants to keep her appointment with Dr. Avila on the 10th of February.

## 2025-02-06 NOTE — ANESTHESIA POSTPROCEDURE EVALUATION
Post-Op Assessment Note    CV Status:  Stable  Pain Score: 0    Pain management: adequate     Mental Status:  Sleepy   Hydration Status:  Stable   PONV Controlled:  None   Airway Patency:  Patent and adequate   Post Op Vitals Reviewed: Yes      Staff: CRNA           BP   120/72   Temp   99 3   Pulse  69   Resp   16   SpO2   100%
Wheelchair

## 2025-02-12 ENCOUNTER — TRANSITIONAL CARE MANAGEMENT (OUTPATIENT)
Dept: INTERNAL MEDICINE CLINIC | Facility: CLINIC | Age: 50
End: 2025-02-12

## 2025-02-16 ENCOUNTER — TREATMENT (OUTPATIENT)
Dept: ENDOCRINOLOGY | Facility: CLINIC | Age: 50
End: 2025-02-16
Payer: COMMERCIAL

## 2025-02-16 DIAGNOSIS — E10.22 TYPE 1 DIABETES MELLITUS WITH DIABETIC CHRONIC KIDNEY DISEASE, UNSPECIFIED CKD STAGE (HCC): Primary | ICD-10-CM

## 2025-02-16 PROCEDURE — 95251 CONT GLUC MNTR ANALYSIS I&R: CPT | Performed by: INTERNAL MEDICINE

## 2025-02-16 NOTE — PROGRESS NOTES
CGM data review::  Device: dexcom Dates: 1/23/25-2/5/25  Usage: 80 % Av glu: 240 mg/dL  SD:  mg/dL CV: 41  % GMI: 9.1  %  TIR: 30 %  TAR: 25+44 %  TBR:   %    Glycemic patters:  labile glycemia with significant severe hyperglycemia almost 50% of the time.  Hypoglycemia: No    Hx:  48 yr female with hx of cystic fibrosis diagnosed age 6 months s/p redo Lt lung transplant 3/2017 and follow up by [facility], recurrent PNA c pseudomonas and long term steroids, Type 1 diabetes, gastroparesis, pulmonary HTN, HTN, hx of DVT on apixaban, osteopenia being managed by PCP, CKD3 eGFR57,  anemia, chronic pain and chronic glucocorticoid and immunosuppressive therapy.       Recommendation:  I called and d/w patient  Humalog Insulin 5u 8u 8u     Regular, Apidra, Humalog orNovolog Sliding Scale:   <80                      151-200 + 1 +1 +1     201-250 +2 +2 +2 +   251-300 +3 +3 +3 +   351-400 +4 +4 +4 +   >401 +5 +5 +5 +      Lantus Insulin  10u

## 2025-02-17 DIAGNOSIS — F11.20 CONTINUOUS OPIOID DEPENDENCE (HCC): ICD-10-CM

## 2025-02-17 DIAGNOSIS — G89.4 CHRONIC PAIN DISORDER: Primary | ICD-10-CM

## 2025-02-17 RX ORDER — INSULIN GLARGINE 100 [IU]/ML
10 INJECTION, SOLUTION SUBCUTANEOUS DAILY
COMMUNITY
Start: 2025-02-10

## 2025-02-17 RX ORDER — OXYCODONE HYDROCHLORIDE 10 MG/1
10 TABLET ORAL EVERY 4 HOURS PRN
Qty: 180 TABLET | Refills: 0 | Status: SHIPPED | OUTPATIENT
Start: 2025-02-17 | End: 2025-03-19

## 2025-02-17 RX ORDER — LINEZOLID 600 MG/1
600 TABLET, FILM COATED ORAL 2 TIMES DAILY
COMMUNITY
Start: 2025-02-09

## 2025-02-17 RX ORDER — ESCITALOPRAM OXALATE 5 MG/1
1 TABLET ORAL
COMMUNITY
Start: 2025-02-10

## 2025-02-21 PROBLEM — R05.9 COUGH: Status: RESOLVED | Noted: 2025-01-19 | Resolved: 2025-02-21

## 2025-03-04 ENCOUNTER — HOSPITAL ENCOUNTER (OUTPATIENT)
Dept: GASTROENTEROLOGY | Facility: HOSPITAL | Age: 50
Setting detail: OUTPATIENT SURGERY
Discharge: HOME | End: 2025-03-04
Attending: INTERNAL MEDICINE
Payer: COMMERCIAL

## 2025-03-04 ENCOUNTER — ANESTHESIA (OUTPATIENT)
Dept: GASTROENTEROLOGY | Facility: HOSPITAL | Age: 50
End: 2025-03-04
Payer: COMMERCIAL

## 2025-03-04 ENCOUNTER — ANESTHESIA EVENT (OUTPATIENT)
Dept: GASTROENTEROLOGY | Facility: HOSPITAL | Age: 50
End: 2025-03-04
Payer: COMMERCIAL

## 2025-03-04 VITALS
WEIGHT: 121.7 LBS | OXYGEN SATURATION: 98 % | RESPIRATION RATE: 18 BRPM | BODY MASS INDEX: 21.56 KG/M2 | DIASTOLIC BLOOD PRESSURE: 77 MMHG | HEIGHT: 63 IN | SYSTOLIC BLOOD PRESSURE: 133 MMHG | HEART RATE: 60 BPM | TEMPERATURE: 97.6 F

## 2025-03-04 DIAGNOSIS — K21.00 GASTROESOPHAGEAL REFLUX DISEASE WITH ESOPHAGITIS WITHOUT HEMORRHAGE: ICD-10-CM

## 2025-03-04 DIAGNOSIS — R12 HEARTBURN: ICD-10-CM

## 2025-03-04 LAB — GLUCOSE SERPL-MCNC: 88 MG/DL (ref 65–140)

## 2025-03-04 PROCEDURE — 88341 IMHCHEM/IMCYTCHM EA ADD ANTB: CPT | Performed by: PATHOLOGY

## 2025-03-04 PROCEDURE — 82948 REAGENT STRIP/BLOOD GLUCOSE: CPT

## 2025-03-04 PROCEDURE — 43239 EGD BIOPSY SINGLE/MULTIPLE: CPT | Performed by: INTERNAL MEDICINE

## 2025-03-04 PROCEDURE — 88312 SPECIAL STAINS GROUP 1: CPT | Performed by: PATHOLOGY

## 2025-03-04 PROCEDURE — 88342 IMHCHEM/IMCYTCHM 1ST ANTB: CPT | Performed by: PATHOLOGY

## 2025-03-04 PROCEDURE — 88305 TISSUE EXAM BY PATHOLOGIST: CPT | Performed by: PATHOLOGY

## 2025-03-04 RX ORDER — PROPOFOL 10 MG/ML
INJECTION, EMULSION INTRAVENOUS AS NEEDED
Status: DISCONTINUED | OUTPATIENT
Start: 2025-03-04 | End: 2025-03-04

## 2025-03-04 RX ORDER — PROPOFOL 10 MG/ML
INJECTION, EMULSION INTRAVENOUS CONTINUOUS PRN
Status: DISCONTINUED | OUTPATIENT
Start: 2025-03-04 | End: 2025-03-04

## 2025-03-04 RX ORDER — SODIUM CHLORIDE, SODIUM LACTATE, POTASSIUM CHLORIDE, CALCIUM CHLORIDE 600; 310; 30; 20 MG/100ML; MG/100ML; MG/100ML; MG/100ML
INJECTION, SOLUTION INTRAVENOUS CONTINUOUS PRN
Status: DISCONTINUED | OUTPATIENT
Start: 2025-03-04 | End: 2025-03-04

## 2025-03-04 RX ADMIN — PROPOFOL 100 MCG/KG/MIN: 10 INJECTION, EMULSION INTRAVENOUS at 10:36

## 2025-03-04 RX ADMIN — PROPOFOL 150 MG: 10 INJECTION, EMULSION INTRAVENOUS at 10:36

## 2025-03-04 RX ADMIN — SODIUM CHLORIDE, SODIUM LACTATE, POTASSIUM CHLORIDE, AND CALCIUM CHLORIDE: .6; .31; .03; .02 INJECTION, SOLUTION INTRAVENOUS at 10:21

## 2025-03-04 NOTE — ANESTHESIA PREPROCEDURE EVALUATION
Procedure:  EGD  BRAVO PH MONITORING    Relevant Problems   CARDIO   (+) DVT (deep venous thrombosis) (HCC)   (+) Essential hypertension   (+) Hypercholesteremia   (+) Pleuritic chest pain   (+) Pulmonary embolus (HCC)   (+) Pulmonary hypertension (HCC)   (+) Rib pain      GI/HEPATIC   (+) Chronic pancreatitis, unspecified pancreatitis type (HCC)   (+) GERD (gastroesophageal reflux disease)   (+) Hiatal hernia   (+) Pancreatic insufficiency      /RENAL   (+) GEOFF (acute kidney injury) (HCC)   (+) Renal calculus, left   (+) Stage 3a chronic kidney disease (HCC)   (+) Type 1 diabetes mellitus with diabetic chronic kidney disease, unspecified CKD stage (HCC)      HEMATOLOGY   (+) Anemia   (+) Pancytopenia, acquired (HCC)   (+) Thrombocytopenia (HCC)      MUSCULOSKELETAL   (+) Hiatal hernia      NEURO/PSYCH   (+) Anxiety   (+) Chronic pain disorder   (+) Continuous opioid dependence (HCC)   (+) Weakness of right lower extremity      PULMONARY   (+) Asthma        Physical Exam    Airway    Mallampati score: II  TM Distance: >3 FB  Neck ROM: full     Dental   No notable dental hx     Cardiovascular  Cardiovascular exam normal    Pulmonary  Pulmonary exam normal     Other Findings  post-pubertal.      Anesthesia Plan  ASA Score- 4     Anesthesia Type- IV sedation with anesthesia with ASA Monitors.         Additional Monitors:     Airway Plan:            Plan Factors-Exercise tolerance (METS): >4 METS.    Chart reviewed. EKG reviewed.  Existing labs reviewed. Patient summary reviewed.    Patient is not a current smoker.              Induction-     Postoperative Plan-     Perioperative Resuscitation Plan - Level 1 - Full Code.       Informed Consent- Anesthetic plan and risks discussed with patient.  I personally reviewed this patient with the CRNA. Discussed and agreed on the Anesthesia Plan with the CRNA..      NPO Status:  Vitals Value Taken Time   Date of last liquid 03/03/25 03/04/25 0956   Time of last liquid 2100  03/04/25 0956   Date of last solid 03/03/25 03/04/25 0956   Time of last solid 2100 03/04/25 0956

## 2025-03-04 NOTE — H&P
History and Physical - [facility]  Maximilian Mann 49 y.o. female MRN: [id number]                  HPI: Maximilian Mann is a 49 y.o. year old female who presents for h/o GERD      REVIEW OF SYSTEMS: Per the HPI, and otherwise unremarkable.    Historical Information   Past Medical History:   Diagnosis Date    Abnormal uterine bleeding (AUB) 7/7/2022    ABPA (allergic bronchopulmonary aspergillosis) ([facility]) 07/08/2012    Acute kidney injury (HCC) 09/08/2017    Acute on chronic respiratory failure (HCC) 03/04/2017    Last Assessment & Plan:  Formatting of this note might be different from the original. Recurrent with clinical worsening with enlarging PTX, s/p multiple chest tubes, but new fevers 6/19 and worsening leukocytosis indicating infection as cause of 6/19 decompensation with profound hypoxia in setting of shunting through right lung and continued pneumothorax s/p 2nd chest tube placement.  6/27: Right    Anaphylaxis 06/06/2022    Anemia     Asthma     Chest pain 09/21/2021    Chronic pain     Chronic respiratory failure with hypoxia (HCC) 09/07/2021    COVID-19 06/03/2022    Cystic fibrosis (HCC)     Deep vein thrombosis (HCC)     Diabetes mellitus ([facility])     Fever 09/22/2024    GERD (gastroesophageal reflux disease)     HBP (high blood pressure)     HCAP (healthcare-associated pneumonia) 03/06/2020    History of transfusion     Influenza B 02/23/2020    Kidney stone     Moderate protein-calorie malnutrition (HCC) 01/08/2022    Neutropenic fever  ([facility]) 09/26/2020    Pneumonia due to Pseudomonas species ([facility]) 10/28/2010    Last Assessment & Plan:  Formatting of this note might be different from the original. Polymicrobial pna w/ pseudomonas, M. Abscessus, Cadida glabrata/albicans    PONV (postoperative nausea and vomiting)     Pseudomonas aeruginosa infection 09/20/2012    Renal calculus     Squamous cell skin cancer 01/12/2023    SCCIS- Left upper thigh    Transplant recipient      Lung- 2013, 2017    Ulcerative colitis (HCC)     Wellness examination 02/04/2013    Formatting of this note might be different from the original. Discussed with patient and acknowledgement form signed on 2/4/2013 (ALYSHA)     Past Surgical History:   Procedure Laterality Date    APPENDECTOMY      AUGMENTATION MAMMAPLASTY Bilateral 2004    AUGMENTATION MAMMAPLASTY Right 2021    removed     BREAST SURGERY      Aug.     BRONCHOSCOPY      CHOLECYSTECTOMY      COLONOSCOPY      EGD  07/2019    Yeast Infect.     FL RETROGRADE PYELOGRAM  01/09/2020    FL RETROGRADE PYELOGRAM  02/04/2020    FL RETROGRADE PYELOGRAM  9/20/2024    GASTROSCOPY  03/01/2024    GASTROSTOMY TUBE, PLACE      IR BALLOON-OCCLUDED ANTEGRADE TRANSVENOUS OBLITERATION (BATO)  11/22/2022    IR PORT PLACEMENT  03/22/2022    IR PORT REMOVAL  01/10/2022    IR PORT STRIPPING  11/25/2022    IR TUNNELED CENTRAL LINE PLACEMENT  01/13/2022    LUNG SURGERY      LUNG TRANSPLANT      X2    MS CYSTO/URETERO W/LITHOTRIPSY &INDWELL STENT INSRT Right 02/04/2020    Procedure: CYSTOSCOPY URETEROSCOPY WITH LITHOTRIPSY HOLMIUM LASER, RETROGRADE PYELOGRAM AND INSERTION STENT URETERAL;  Surgeon: Patricio Perry MD;  Location: [facility];  Service: Urology    MS CYSTO/URETERO W/LITHOTRIPSY &INDWELL STENT INSRT Left 9/20/2024    Procedure: CYSTOSCOPY URETEROSCOPY WITH LITHOTRIPSY HOLMIUM LASER, RETROGRADE PYELOGRAM AND INSERTION STENT URETERAL, BASKET STONE EXTRACTION;  Surgeon: Lee Briceno MD;  Location: [facility];  Service: Urology    MS CYSTOURETHROSCOPY W/URETERAL CATHETERIZATION Right 01/09/2020    Procedure: CYSTOSCOPY RETROGRADE PYELOGRAM WITH INSERTION STENT URETERAL;  Surgeon: Patricio Perry MD;  Location: [facility];  Service: Urology    SKIN BIOPSY      US GUIDED VASCULAR ACCESS  01/17/2017     Social History   Social History     Substance and Sexual Activity   Alcohol Use Yes    Alcohol/week: 2.0 standard drinks of alcohol    Types: 2 Glasses of wine  per week    Comment: social     Social History     Substance and Sexual Activity   Drug Use Yes    Frequency: 2.0 times per week    Types: Marijuana    Comment: medical edible marijuana prescribed     Social History     Tobacco Use   Smoking Status Former    Current packs/day: 0.00    Average packs/day: 0.5 packs/day for 12.0 years (6.0 ttl pk-yrs)    Types: Cigarettes    Start date: 1993    Quit date: 2005    Years since quittin.1    Passive exposure: Past   Smokeless Tobacco Never     Family History   Problem Relation Age of Onset    Alcohol abuse Mother     Mental illness Mother     Depression Mother     Alcohol abuse Father     COPD Maternal Grandmother     Lung cancer Maternal Grandmother 65    Cancer Maternal Grandmother     COPD Maternal Grandfather     Cancer Maternal Grandfather     COPD Paternal Grandmother     Lung cancer Paternal Grandmother 65    No Known Problems Sister     No Known Problems Paternal Aunt     Breast cancer Neg Hx     Ovarian cancer Neg Hx     Colon cancer Neg Hx        Meds/Allergies       Current Outpatient Medications:     acetaminophen (TYLENOL) 325 mg tablet    albuterol (2.5 mg/3 mL) 0.083 % nebulizer solution    albuterol (PROVENTIL HFA,VENTOLIN HFA) 90 mcg/act inhaler    ALPRAZolam (XANAX) 1 mg tablet    apixaban (ELIQUIS) 5 mg    Ascorbic Acid (vitamin C) 100 MG tablet    atovaquone (MEPRON) 750 mg/5 mL suspension    azelastine (OPTIVAR) 0.05 % ophthalmic solution    azithromycin (ZITHROMAX) 250 mg tablet    BD Pen Needle Abi U/F 32G X 4 MM MISC    bisacodyl (DULCOLAX) 5 mg EC tablet    budesonide-formoterol (SYMBICORT) 160-4.5 mcg/act inhaler    Cholecalciferol (Vitamin D3) 125 MCG (5000 UT) TABS    Continuous Blood Gluc  (Dexcom G7 ) SUKHI    Continuous Glucose Sensor (Dexcom G7 Sensor)    cyclobenzaprine (FLEXERIL) 5 mg tablet    dexlansoprazole (DEXILANT) 60 MG capsule    dronabinol (MARINOL) 5 MG capsule    escitalopram (LEXAPRO) 5 mg tablet     famotidine (PEPCID) 40 MG tablet    furosemide (LASIX) 20 mg tablet    insulin aspart (NovoLOG FlexPen) 100 UNIT/ML injection pen    insulin degludec (Tresiba FlexTouch) 100 units/mL injection pen    Insulin Disposable Pump (Omnipod 5 G6 Intro, Gen 5,) KIT    Insulin Disposable Pump (Omnipod 5 G6 Pods, Gen 5,) MISC    insulin glargine (LANTUS) 100 units/mL subcutaneous injection    Insulin Pen Needle (Comfort EZ Pen Needles) 33G X 4 MM MISC    linezolid (ZYVOX) 600 mg tablet    magnesium gluconate (MAGONATE) 500 mg tablet    naloxone (NARCAN) 4 mg/0.1 mL nasal spray    ondansetron (ZOFRAN) 4 mg tablet    oxybutynin (DITROPAN XL) 15 MG 24 hr tablet    oxyCODONE (ROXICODONE) 10 MG TABS    pancrelipase, Lip-Prot-Amyl, (CREON) 24,000 units    pantoprazole (PROTONIX) 40 mg tablet    polyethylene glycol (MIRALAX) 17 g packet    pregabalin (LYRICA) 150 mg capsule    Respiratory Therapy Supplies (Nebulizer) SUKHI    tacrolimus (PROGRAF) 1 mg capsule    Turmeric 500 MG CAPS    valACYclovir (VALTREX) 500 mg tablet    vitamin B-12 (VITAMIN B-12) 1,000 mcg tablet    Allergies   Allergen Reactions    Bebtelovimab Anaphylaxis    Vancomycin Angioedema and Hives    Gabapentin Hallucinations    Ceftazidime Headache     Severe headaches after desensitization    Nsaids Other (See Comments)     Lung transplant increases risk of renal toxicity, call lung txp provider to discuss if needed       Objective     There were no vitals taken for this visit.      PHYSICAL EXAM    Gen: NAD  Head: NCAT  CV: RRR  CHEST: Clear  ABD: soft, NT/ND  EXT: no edema      ASSESSMENT/PLAN:  This is a 49 y.o. year old female here for EGD Bravo, and she is stable and optimized for her procedure.

## 2025-03-11 NOTE — PERIOPERATIVE NURSING NOTE
2 attempted calls (3/10, 3/11) to pt regarding return of BRAVO monitor. VMs left with return call back#.

## 2025-03-12 ENCOUNTER — TELEPHONE (OUTPATIENT)
Age: 50
End: 2025-03-12

## 2025-03-12 NOTE — TELEPHONE ENCOUNTER
I lmom for pt to please call back and informed BRAVO unit needs to be returned.  Will call again if do not hear back from pt.

## 2025-03-12 NOTE — TELEPHONE ENCOUNTER
Patients GI provider:  Dr. KEYS    Number to return call: ([phone]    Reason for call: Annmarie from [facility] contacted office stating they sent patient home with a Bravo PH monitor and she did not return monitor. They are questioning if she may have returned to the office. Please contact [facility] back at [phone]

## 2025-03-13 PROCEDURE — 88305 TISSUE EXAM BY PATHOLOGIST: CPT | Performed by: PATHOLOGY

## 2025-03-13 PROCEDURE — 88342 IMHCHEM/IMCYTCHM 1ST ANTB: CPT | Performed by: PATHOLOGY

## 2025-03-13 PROCEDURE — 88312 SPECIAL STAINS GROUP 1: CPT | Performed by: PATHOLOGY

## 2025-03-13 PROCEDURE — 88341 IMHCHEM/IMCYTCHM EA ADD ANTB: CPT | Performed by: PATHOLOGY

## 2025-03-14 DIAGNOSIS — K21.00 GASTROESOPHAGEAL REFLUX DISEASE WITH ESOPHAGITIS WITHOUT HEMORRHAGE: ICD-10-CM

## 2025-03-14 RX ORDER — DEXLANSOPRAZOLE 60 MG/1
60 CAPSULE, DELAYED RELEASE ORAL DAILY
Qty: 100 CAPSULE | Refills: 1 | Status: SHIPPED | OUTPATIENT
Start: 2025-03-14

## 2025-03-17 DIAGNOSIS — F11.20 CONTINUOUS OPIOID DEPENDENCE (HCC): ICD-10-CM

## 2025-03-17 DIAGNOSIS — G89.4 CHRONIC PAIN DISORDER: ICD-10-CM

## 2025-03-17 RX ORDER — OXYCODONE HYDROCHLORIDE 10 MG/1
10 TABLET ORAL EVERY 4 HOURS PRN
Qty: 180 TABLET | Refills: 0 | Status: SHIPPED | OUTPATIENT
Start: 2025-03-17 | End: 2025-04-16

## 2025-03-17 NOTE — TELEPHONE ENCOUNTER
Medication: oxyCODONE (ROXICODONE) 10 MG TABS    Dose/Frequency: Take 1 tablet (10 mg total) by mouth every 4 (four) hours as needed for severe pain     Quantity: 180 tablet     Pharmacy: [id number] - [place], [place] - [address]     Office:   [x] PCP/Provider -  Solis Avila MD  [] Speciality/Provider -     Does the patient have enough for 3 days?   [] Yes   [x] No - Send as HP to POD

## 2025-03-24 ENCOUNTER — TELEPHONE (OUTPATIENT)
Dept: GASTROENTEROLOGY | Facility: CLINIC | Age: 50
End: 2025-03-24

## 2025-03-24 NOTE — TELEPHONE ENCOUNTER
ZEENAT Cook MA Lisa Lipyanic, LPN  [facility]  Clerical  Replies will be sent to [facility]  Clinical  Please call patient to schedule f/u appt to egd w/bravo. Thank you       Previous Messages               Previous Messages       ----- Message -----  From: Justin Ko MD  Sent: 3/24/2025   3:05 PM EDT  To: Waleska England LPN    The reports are on the side table in my office  ----- Message -----  From: Waleska England LPN  Sent: 3/24/2025   2:09 PM EDT  To: Justin Ko MD    Please let me know who the patients are.  ----- Message -----  From: Justin Ko MD  Sent: 3/24/2025   2:05 PM EDT  To: Waleska England LPN    I read two Bravos last week, they were both abnormal.  If they want to come in to discuss the results and further plans,you can bring them in tomorrow.

## 2025-03-24 NOTE — TELEPHONE ENCOUNTER
I lmom for pt to please call back to schedule follow up appt.  Will call again if do not hear back from pt.

## 2025-04-02 DIAGNOSIS — B37.31 VAGINAL YEAST INFECTION: ICD-10-CM

## 2025-04-02 DIAGNOSIS — F41.9 ANXIETY: Primary | ICD-10-CM

## 2025-04-02 RX ORDER — ALPRAZOLAM 1 MG/1
1 TABLET ORAL
Qty: 30 TABLET | Refills: 2 | Status: SHIPPED | OUTPATIENT
Start: 2025-04-02

## 2025-04-02 RX ORDER — FLUCONAZOLE 150 MG/1
150 TABLET ORAL ONCE
Qty: 1 TABLET | Refills: 0 | Status: SHIPPED | OUTPATIENT
Start: 2025-04-02 | End: 2025-04-02

## 2025-04-04 ENCOUNTER — TREATMENT (OUTPATIENT)
Dept: ENDOCRINOLOGY | Facility: CLINIC | Age: 50
End: 2025-04-04
Payer: COMMERCIAL

## 2025-04-04 DIAGNOSIS — E10.22 TYPE 1 DIABETES MELLITUS WITH DIABETIC CHRONIC KIDNEY DISEASE, UNSPECIFIED CKD STAGE (HCC): Primary | ICD-10-CM

## 2025-04-04 PROCEDURE — 95251 CONT GLUC MNTR ANALYSIS I&R: CPT | Performed by: INTERNAL MEDICINE

## 2025-04-04 NOTE — PROGRESS NOTES
CGM data review::  Device: dexcom Dates: 3/9/25-3/22/25  Usage: 88 % Av glu: 254 mg/dL  SD: 105 mg/dL CV: 41.4 % GMI: 9.4 % TIR: 28 %  TAR: 24+47 %  TBR:   %  Glycemic patters:  some fasting and significant pp hyperglycemia. Data suggests certain missed doses based on patterns. Late night hyperglycemia. Will add correction at bedtime  Hypoglycemia: No    Recommendation:  Humalog Insulin 5u 8u 8u     Regular, Apidra, Humalog orNovolog Sliding Scale:   <80                      151-200 + 1 +1 +1     201-250 +2 +2 +2 +1   251-300 +3 +3 +3 +2   351-400 +4 +4 +4 +3   >401 +5 +5 +5 +4      Lantus Insulin  10u

## 2025-04-09 ENCOUNTER — TELEPHONE (OUTPATIENT)
Dept: DERMATOLOGY | Facility: CLINIC | Age: 50
End: 2025-04-09

## 2025-04-09 NOTE — TELEPHONE ENCOUNTER
Called regarding pt's appointment on 7/2 with Dr. Romero in [facility] office. Advised appt needs to be rescheduled, as provider will no longer be seeing patient's at the [facility] office. Left a message to call the office to reschedule. Can offer appt with Dr. Romero at [place], or with another provider in  or [place]. If pt wants to see Dr. Romero at [place], please message Rach when pt calls back and I can let you know what I have available.

## 2025-04-14 ENCOUNTER — OFFICE VISIT (OUTPATIENT)
Dept: INTERNAL MEDICINE CLINIC | Facility: CLINIC | Age: 50
End: 2025-04-14
Payer: MEDICARE

## 2025-04-14 VITALS
HEART RATE: 75 BPM | BODY MASS INDEX: 21.26 KG/M2 | WEIGHT: 120 LBS | RESPIRATION RATE: 18 BRPM | SYSTOLIC BLOOD PRESSURE: 150 MMHG | DIASTOLIC BLOOD PRESSURE: 94 MMHG | HEIGHT: 63 IN | OXYGEN SATURATION: 98 % | TEMPERATURE: 96.4 F

## 2025-04-14 DIAGNOSIS — E78.00 HYPERCHOLESTEREMIA: ICD-10-CM

## 2025-04-14 DIAGNOSIS — M54.16 LUMBAR RADICULOPATHY: ICD-10-CM

## 2025-04-14 DIAGNOSIS — E10.22 TYPE 1 DIABETES MELLITUS WITH DIABETIC CHRONIC KIDNEY DISEASE, UNSPECIFIED CKD STAGE (HCC): ICD-10-CM

## 2025-04-14 DIAGNOSIS — M85.80 OSTEOPENIA, UNSPECIFIED LOCATION: ICD-10-CM

## 2025-04-14 DIAGNOSIS — Z00.00 ANNUAL PHYSICAL EXAM: Primary | ICD-10-CM

## 2025-04-14 DIAGNOSIS — Z94.2 LUNG REPLACED BY TRANSPLANT (HCC): ICD-10-CM

## 2025-04-14 DIAGNOSIS — D50.9 IRON DEFICIENCY ANEMIA, UNSPECIFIED IRON DEFICIENCY ANEMIA TYPE: ICD-10-CM

## 2025-04-14 DIAGNOSIS — R53.82 CHRONIC FATIGUE: ICD-10-CM

## 2025-04-14 DIAGNOSIS — G89.4 CHRONIC PAIN SYNDROME: ICD-10-CM

## 2025-04-14 DIAGNOSIS — M79.671 RIGHT FOOT PAIN: ICD-10-CM

## 2025-04-14 DIAGNOSIS — R05.3 CHRONIC COUGH: ICD-10-CM

## 2025-04-14 DIAGNOSIS — F41.9 ANXIETY: ICD-10-CM

## 2025-04-14 DIAGNOSIS — T86.810 CHRONIC REJECTION OF LUNG TRANSPLANT (HCC): ICD-10-CM

## 2025-04-14 PROBLEM — Z86.711 HISTORY OF PULMONARY EMBOLUS (PE): Status: RESOLVED | Noted: 2024-09-20 | Resolved: 2025-04-14

## 2025-04-14 PROBLEM — R65.10 SIRS (SYSTEMIC INFLAMMATORY RESPONSE SYNDROME) (HCC): Status: RESOLVED | Noted: 2024-10-23 | Resolved: 2025-04-14

## 2025-04-14 PROBLEM — R07.81 RIB PAIN: Status: RESOLVED | Noted: 2022-08-01 | Resolved: 2025-04-14

## 2025-04-14 PROBLEM — R06.02 SHORTNESS OF BREATH: Status: ACTIVE | Noted: 2018-06-03

## 2025-04-14 PROBLEM — R09.02 HYPOXIA: Status: ACTIVE | Noted: 2025-01-20

## 2025-04-14 PROBLEM — J02.9 SORE THROAT: Status: RESOLVED | Noted: 2024-09-20 | Resolved: 2025-04-14

## 2025-04-14 PROBLEM — R23.3 EASY BRUISING: Status: ACTIVE | Noted: 2025-04-14

## 2025-04-14 PROBLEM — Z86.718 HISTORY OF DVT (DEEP VEIN THROMBOSIS): Chronic | Status: RESOLVED | Noted: 2024-09-20 | Resolved: 2025-04-14

## 2025-04-14 PROBLEM — R09.02 HYPOXIA: Status: RESOLVED | Noted: 2025-01-20 | Resolved: 2025-04-14

## 2025-04-14 PROCEDURE — 99214 OFFICE O/P EST MOD 30 MIN: CPT | Performed by: INTERNAL MEDICINE

## 2025-04-14 PROCEDURE — G0438 PPPS, INITIAL VISIT: HCPCS | Performed by: INTERNAL MEDICINE

## 2025-04-14 RX ORDER — PREGABALIN 150 MG/1
150 CAPSULE ORAL DAILY
Qty: 90 CAPSULE | Refills: 1 | Status: SHIPPED | OUTPATIENT
Start: 2025-04-14 | End: 2025-10-11

## 2025-04-14 RX ORDER — ESCITALOPRAM OXALATE 5 MG/1
5 TABLET ORAL EVERY OTHER DAY
Start: 2025-04-14

## 2025-04-14 RX ORDER — SODIUM CHLORIDE FOR INHALATION 7 %
VIAL, NEBULIZER (ML) INHALATION
COMMUNITY
Start: 2025-02-28

## 2025-04-14 RX ORDER — FLUCONAZOLE 150 MG/1
1 TABLET ORAL DAILY
COMMUNITY
Start: 2025-04-02

## 2025-04-14 RX ORDER — ALPRAZOLAM 1 MG/1
1 TABLET ORAL 2 TIMES DAILY PRN
Qty: 45 TABLET
Start: 2025-04-14 | End: 2025-05-14

## 2025-04-14 RX ORDER — HYDROCODONE POLISTIREX AND CHLORPHENIRAMINE POLISTIREX 10; 8 MG/5ML; MG/5ML
5 SUSPENSION, EXTENDED RELEASE ORAL EVERY 12 HOURS PRN
Qty: 70 ML | Refills: 0 | Status: SHIPPED | OUTPATIENT
Start: 2025-04-14

## 2025-04-14 RX ORDER — PREDNISONE 10 MG/1
1.5 TABLET ORAL DAILY
COMMUNITY
Start: 2025-03-14

## 2025-04-14 RX ORDER — FERROUS SULFATE 325(65) MG
TABLET ORAL
COMMUNITY
Start: 2025-03-14

## 2025-04-14 NOTE — ASSESSMENT & PLAN NOTE
Orders:  •  Hydrocod Edgar-Chlorphe Edgar ER (TUSSIONEX) 10-8 mg/5 mL ER suspension; Take 5 mL by mouth every 12 (twelve) hours as needed for cough Max Daily Amount: 10 mL

## 2025-04-14 NOTE — ASSESSMENT & PLAN NOTE
DEXA 1/8/24   LUMBAR SPINE  Level: L1-L4:  BMD: 0.963 gm/cm2  Z-score: -0.3        LEFT TOTAL HIP:  BMD: 0.650 gm/cm2  Z-score: -2.1     LEFT FEMORAL NECK:  BMD: 0.647 gm/cm2  Z-score: -1.3     Recheck vit D to ensure optimization.  Plan for repeat DEXA 1/2026    Orders:  •  Vitamin D 25 hydroxy; Future

## 2025-04-14 NOTE — ASSESSMENT & PLAN NOTE
Lab Results   Component Value Date    HGBA1C 7.8 (H) 12/16/2024   Follows closely with endocrinology, Dr. Polk   Continue close outpatient follow-up  Diabetic foot exam performed in the office today    Orders:  •  Albumin / creatinine urine ratio; Future

## 2025-04-14 NOTE — ASSESSMENT & PLAN NOTE
NANI possibly contributing to fatigue.  Will recheck CBC -  if Hgb still in the 7 range, will likely have her go for a blood transfusion see if this helps.  I suspect her chronic fatigue may be multifactorial so we will check repeat vit D levels and TSH  Orders:  •  CBC and differential; Future  •  TIBC Panel (incl. Iron, TIBC, % Iron Saturation); Future  •  TSH, 3rd generation with Free T4 reflex; Future

## 2025-04-14 NOTE — PROGRESS NOTES
Adult Annual Physical  Name: Maximilian Mann      : 1975      MRN: [id number]  Encounter Provider: Solis Avila MD  Encounter Date: 2025   Encounter department: [facility] [place]    :  Assessment & Plan  Annual physical exam  See Below       Type 1 diabetes mellitus with diabetic chronic kidney disease, unspecified CKD stage (HCC)    Lab Results   Component Value Date    HGBA1C 7.8 (H) 2024   Follows closely with endocrinology, Dr. Polk   Continue close outpatient follow-up  Diabetic foot exam performed in the office today    Orders:  •  Albumin / creatinine urine ratio; Future    Chronic rejection of lung transplant (HCC)  Follows closely with her Transplant Team at [facility]  Chronic Immunosuppression with Tacrolimus and prednisone (currently at 20 mg daily, baseline 15 mg.  Tapered by her Transplant teams discretion  Continue with prophlactic Valtrex, atovaquone and azithromycin       Iron deficiency anemia, unspecified iron deficiency anemia type    Orders:  •  CBC and differential; Future  •  TIBC Panel (incl. Iron, TIBC, % Iron Saturation); Future    Chronic fatigue  NANI possibly contributing to fatigue.  Will recheck CBC -  if Hgb still in the 7 range, will likely have her go for a blood transfusion see if this helps.  I suspect her chronic fatigue may be multifactorial so we will check repeat vit D levels and TSH  Orders:  •  CBC and differential; Future  •  TIBC Panel (incl. Iron, TIBC, % Iron Saturation); Future  •  TSH, 3rd generation with Free T4 reflex; Future    Osteopenia, unspecified location  DEXA 24   LUMBAR SPINE  Level: L1-L4:  BMD: 0.963 gm/cm2  Z-score: -0.3        LEFT TOTAL HIP:  BMD: 0.650 gm/cm2  Z-score: -2.1     LEFT FEMORAL NECK:  BMD: 0.647 gm/cm2  Z-score: -1.3     Recheck vit D to ensure optimization.  Plan for repeat DEXA 2026    Orders:  •  Vitamin D 25 hydroxy; Future    Anxiety  Started on 5 mg Lexapro during recent  "hospitalization, however she feels medicine worsening her mood and having a paradoxical effect.  She has mild SI which is new for her though specifically denies any clear plan to harm herself or others, feels safe.    We will stop her Lexapro, have her take the medication QOD x2 weeks then stop.  Will notify me if symptoms do not improve over the next few weeks with stopping SSRI therapy.  Orders:  •  escitalopram (LEXAPRO) 5 mg tablet; Take 1 tablet (5 mg total) by mouth every other day  •  ALPRAZolam (XANAX) 1 mg tablet; Take 1 tablet (1 mg total) by mouth 2 (two) times a day as needed for anxiety or sleep    Hypercholesteremia    Orders:  •  Lipid Panel with Direct LDL reflex; Future    Right foot pain  Patient wants to wean off of Lyrica, she has no neuropathic pains in the RLE.  Currently taking BID dosing, will change dosing to once daily 150 mg.  If tolerating taper during next follow-up in 3 months, we will go down to 75 mg at that time  Orders:  •  pregabalin (LYRICA) 150 mg capsule; Take 1 capsule (150 mg total) by mouth daily    Chronic pain syndrome  See above plan for \"Right foot pain\"  Orders:  •  pregabalin (LYRICA) 150 mg capsule; Take 1 capsule (150 mg total) by mouth daily    Lumbar radiculopathy  See above plan for \"Right foot pain\"  Orders:  •  pregabalin (LYRICA) 150 mg capsule; Take 1 capsule (150 mg total) by mouth daily    Chronic cough    Orders:  •  Hydrocod Edgar-Chlorphe Edgar ER (TUSSIONEX) 10-8 mg/5 mL ER suspension; Take 5 mL by mouth every 12 (twelve) hours as needed for cough Max Daily Amount: 10 mL    Lung replaced by transplant (HCC)    Orders:  •  Hydrocod Edgar-Chlorphe Edgar ER (TUSSIONEX) 10-8 mg/5 mL ER suspension; Take 5 mL by mouth every 12 (twelve) hours as needed for cough Max Daily Amount: 10 mL        Preventive Screenings:    - Breast cancer screening: screening up-to-date     Immunizations:  - Immunizations due: Tdap and Zoster (Shingrix)         History of Present Illness "     Adult Annual Physical:  Patient presents for annual physical.     Diet and Physical Activity:  - Diet/Nutrition: diabetic diet.  - Exercise: no formal exercise.    General Health:    - Hearing: normal hearing bilateral ears.    /GYN Health:  - Follows with GYN: yes.     Review of Systems   Constitutional:  Positive for activity change and fatigue. Negative for chills and fever.   Respiratory:  Positive for cough. Negative for wheezing.    Cardiovascular:  Positive for leg swelling.   Gastrointestinal:  Negative for abdominal pain, diarrhea, nausea and vomiting.   Musculoskeletal:  Positive for arthralgias.   Neurological:  Positive for tremors.   Psychiatric/Behavioral:  Positive for sleep disturbance.      Medical History Reviewed by provider this encounter:     .  Current Outpatient Medications on File Prior to Visit   Medication Sig Dispense Refill   • acetaminophen (TYLENOL) 325 mg tablet Take 3 tablets (975 mg total) by mouth every 8 (eight) hours  0   • albuterol (2.5 mg/3 mL) 0.083 % nebulizer solution Take 6 mL (5 mg total) by nebulization 2 (two) times a day  0   • albuterol (PROVENTIL HFA,VENTOLIN HFA) 90 mcg/act inhaler INHALE 2 PUFFS EVERY 8 HOURS AS NEEDED FOR SHORTNESS OF BREATH OR WHEEZING.     • apixaban (ELIQUIS) 5 mg Take 1 tablet (5 mg total) by mouth 2 (two) times a day 30 tablet 0   • Ascorbic Acid (vitamin C) 100 MG tablet Take 100 mg by mouth daily     • atovaquone (MEPRON) 750 mg/5 mL suspension Take 1,500 mg by mouth daily     • azelastine (OPTIVAR) 0.05 % ophthalmic solution INSTILL 1 DROP INTO BOTH EYES TWICE A DAY 18 mL 1   • azithromycin (ZITHROMAX) 250 mg tablet Take 250 mg by mouth 3 (three) times a week     • BD Pen Needle Abi U/F 32G X 4 MM MISC 4 times daily 400 each 1   • bisacodyl (DULCOLAX) 5 mg EC tablet Take 1 tablet (5 mg total) by mouth daily at bedtime 30 tablet 0   • budesonide-formoterol (SYMBICORT) 160-4.5 mcg/act inhaler Inhale 2 puffs every 12 (twelve) hours      • Cholecalciferol (Vitamin D3) 125 MCG (5000 UT) TABS Take 5,000 Units by mouth daily     • Continuous Blood Gluc  (Dexcom G7 ) SUKHI Use 1 each continuous 1 each 0   • Continuous Glucose Sensor (Dexcom G7 Sensor) Use 1 Device every 10 days 3 each 5   • dexlansoprazole (DEXILANT) 60 MG capsule Take 1 capsule (60 mg total) by mouth daily 100 capsule 1   • dronabinol (MARINOL) 5 MG capsule Take 1 capsule (5 mg total) by mouth 3 (three) times a day 90 capsule 0   • famotidine (PEPCID) 40 MG tablet TAKE 1 TABLET BY MOUTH EVERYDAY AT BEDTIME 90 tablet 1   • ferrous sulfate 325 (65 Fe) mg tablet TAKE 1 TABLET BY MOUTH DAILY WITH LUNCH.     • fluconazole (DIFLUCAN) 150 mg tablet Take 1 tablet by mouth in the morning     • furosemide (LASIX) 20 mg tablet Take 40 mg by mouth daily     • insulin aspart (NovoLOG FlexPen) 100 UNIT/ML injection pen INJECT 10 UNITS pre-BREAKFAST, 8 UNITS pre-LUNCH and 12 UNITS pre-DINNER plus scale; max 50 UNITS/day 45 mL 1   • insulin degludec (Tresiba FlexTouch) 100 units/mL injection pen Inject 12 Units under the skin daily 15 mL 1   • Insulin Disposable Pump (Omnipod 5 G6 Intro, Gen 5,) KIT Use 1 Units continuous 1 kit 0   • Insulin Disposable Pump (Omnipod 5 G6 Pods, Gen 5,) MISC Use 1 Units every other day 15 each 3   • insulin glargine (LANTUS) 100 units/mL subcutaneous injection Inject 10 Units under the skin daily     • Insulin Pen Needle (Comfort EZ Pen Needles) 33G X 4 MM MISC Use to inject insulin 4 times a day 100 each 3   • linezolid (ZYVOX) 600 mg tablet Take 600 mg by mouth 2 (two) times a day     • magnesium gluconate (MAGONATE) 500 mg tablet Take 500 mg by mouth 2 (two) times a day     • naloxone (NARCAN) 4 mg/0.1 mL nasal spray Administer 1 spray into a nostril. If no response after 2-3 minutes, give another dose in the other nostril using a new spray. 1 each 1   • ondansetron (ZOFRAN) 4 mg tablet Take 4 mg by mouth every 8 (eight) hours as needed for nausea      • oxybutynin (DITROPAN XL) 15 MG 24 hr tablet Take 1 tablet (15 mg total) by mouth daily at bedtime 90 tablet 3   • oxyCODONE (ROXICODONE) 10 MG TABS Take 1 tablet (10 mg total) by mouth every 4 (four) hours as needed for severe pain Max Daily Amount: 60 mg 180 tablet 0   • pancrelipase, Lip-Prot-Amyl, (CREON) 24,000 units Take 48,000 Units by mouth 3 (three) times a day with meals     • polyethylene glycol (MIRALAX) 17 g packet Take 17 g by mouth 2 (two) times a day  0   • predniSONE 10 mg tablet Take 1.5 tablets by mouth in the morning     • Respiratory Therapy Supplies (Nebulizer) SUKHI      • Sodium Chloride 7 % NEBU TAKE 4 ML USING NEBULIZER EVERY 12 HOURS AS NEEDED (FOR AIRWAY CLEARANCE).     • tacrolimus (PROGRAF) 1 mg capsule Take 3 capsules (3 mg total) by mouth 2 (two) times a day     • Turmeric 500 MG CAPS Take by mouth     • valACYclovir (VALTREX) 500 mg tablet Take 500 mg by mouth daily     • vitamin B-12 (VITAMIN B-12) 1,000 mcg tablet Take by mouth daily     • [DISCONTINUED] ALPRAZolam (XANAX) 1 mg tablet Take 1 tablet (1 mg total) by mouth daily at bedtime as needed for anxiety or sleep 30 tablet 2   • [DISCONTINUED] escitalopram (LEXAPRO) 5 mg tablet Take 1 tablet by mouth daily at bedtime     • [DISCONTINUED] pregabalin (LYRICA) 150 mg capsule Take 1 capsule (150 mg total) by mouth 2 times a day 180 capsule 0   • cyclobenzaprine (FLEXERIL) 5 mg tablet Take 1 tablet (5 mg total) by mouth 3 (three) times a day as needed for muscle spasms for up to 14 days 24 tablet 0   • pantoprazole (PROTONIX) 40 mg tablet Take 1 tablet (40 mg total) by mouth 2 (two) times a day before meals 60 tablet 0   • [DISCONTINUED] traZODone (DESYREL) 50 mg tablet Take 0.5 tablets (25 mg total) by mouth daily at bedtime  0     No current facility-administered medications on file prior to visit.      Social History     Tobacco Use   • Smoking status: Former     Current packs/day: 0.00     Average packs/day: 0.5 packs/day for  "12.0 years (6.0 ttl pk-yrs)     Types: Cigarettes     Start date: 1993     Quit date: 2005     Years since quittin.2     Passive exposure: Past   • Smokeless tobacco: Never   Vaping Use   • Vaping status: Never Used   Substance and Sexual Activity   • Alcohol use: Yes     Alcohol/week: 2.0 standard drinks of alcohol     Types: 2 Glasses of wine per week     Comment: social   • Drug use: Yes     Frequency: 2.0 times per week     Types: Marijuana     Comment: medical edible marijuana prescribed   • Sexual activity: Yes     Partners: Male     Birth control/protection: None       Objective   /94 (BP Location: Left arm, Patient Position: Sitting, Cuff Size: Adult)   Pulse 75   Temp (!) 96.4 °F (35.8 °C) (Tympanic)   Resp 18   Ht 5' 2.5\" (1.588 m)   Wt 54.4 kg (120 lb)   SpO2 98%   BMI 21.60 kg/m²     Physical Exam  Vitals reviewed.   Constitutional:       General: She is not in acute distress.     Appearance: She is not toxic-appearing.   Cardiovascular:      Rate and Rhythm: Normal rate and regular rhythm.      Pulses: no weak pulses.           Dorsalis pedis pulses are 2+ on the right side and 2+ on the left side.   Pulmonary:      Breath sounds: No wheezing, rhonchi or rales.      Comments: Coarse breath sounds bilaterally  Abdominal:      General: There is distension.      Palpations: There is no mass.      Tenderness: There is no abdominal tenderness. There is no guarding.   Musculoskeletal:         General: No swelling.   Feet:      Right foot:      Skin integrity: No ulcer, skin breakdown, erythema, warmth, callus or dry skin.      Left foot:      Skin integrity: No ulcer, skin breakdown, erythema, warmth, callus or dry skin.   Skin:     General: Skin is warm and dry.      Coloration: Skin is pale.      Findings: Bruising present.       Patient's shoes and socks removed.    Right Foot/Ankle   Right Foot Inspection  Skin Exam: skin normal and skin intact. No dry skin, no warmth, no callus, " no erythema, no maceration, no abnormal color, no pre-ulcer, no ulcer and no callus.     Toe Exam: No swelling, no tenderness, erythema and  no right toe deformity    Sensory   Vibration: intact  Proprioception: intact  Monofilament testing: intact    Vascular  Capillary refills: < 3 seconds  The right DP pulse is 2+.     Left Foot/Ankle  Left Foot Inspection  Skin Exam: skin normal and skin intact. No dry skin, no warmth, no erythema, no maceration, normal color, no pre-ulcer, no ulcer and no callus.     Toe Exam: No swelling, no tenderness, no erythema and no left toe deformity.     Sensory   Vibration: intact  Proprioception: intact  Monofilament testing: intact    Vascular  Capillary refills: < 3 seconds  The left DP pulse is 2+.     Assign Risk Category  No deformity present  No loss of protective sensation  No weak pulses  Risk: 0

## 2025-04-14 NOTE — ASSESSMENT & PLAN NOTE
Started on 5 mg Lexapro during recent hospitalization, however she feels medicine worsening her mood and having a paradoxical effect.  She has mild SI which is new for her though specifically denies any clear plan to harm herself or others, feels safe.    We will stop her Lexapro, have her take the medication QOD x2 weeks then stop.  Will notify me if symptoms do not improve over the next few weeks with stopping SSRI therapy.  Orders:  •  escitalopram (LEXAPRO) 5 mg tablet; Take 1 tablet (5 mg total) by mouth every other day  •  ALPRAZolam (XANAX) 1 mg tablet; Take 1 tablet (1 mg total) by mouth 2 (two) times a day as needed for anxiety or sleep

## 2025-04-14 NOTE — ASSESSMENT & PLAN NOTE
Follows closely with her Transplant Team at [facility]  Chronic Immunosuppression with Tacrolimus and prednisone (currently at 20 mg daily, baseline 15 mg.  Tapered by her Transplant teams discretion  Continue with prophlactic Valtrex, atovaquone and azithromycin

## 2025-04-18 ENCOUNTER — PATIENT MESSAGE (OUTPATIENT)
Dept: DERMATOLOGY | Facility: CLINIC | Age: 50
End: 2025-04-18

## 2025-04-18 DIAGNOSIS — L70.0 ACNE VULGARIS: Primary | ICD-10-CM

## 2025-04-22 NOTE — TELEPHONE ENCOUNTER
Dr Carrera, please see patient message.    Dr DONIS as some opening as well if patient needs to be seen by a Physician sooner.

## 2025-04-29 NOTE — TELEPHONE ENCOUNTER
I lmom informing pt that we received Eliquis clearance back from Dr Kurtz and to hold her Eliquis 2 days prior to the procedure. I asked pt to please call back to let me know that she received my message. Will call pt again if do not hear back from her.    [FreeTextEntry1] : 71-year-old male with a past medical history of hyperlipidemia and prostate CA comes in for preop clearance and evaluation of new onset atrial flutter.  Patient went to his primary care physician for preop clearance for a rectal spacer.  Preoperative EKG revealed new onset atrial flutter.  Patient was treated with metoprolol without anticoagulation due to a [id number] score of 0.  He now comes in for management of atrial flutter and clearance for a rectal spacer in Plans for radiation treatment for prostate CA.  Overall feeling well denies chest pain shortness of breath PND orthopnea or palpitations.

## 2025-05-01 ENCOUNTER — TELEPHONE (OUTPATIENT)
Dept: DERMATOLOGY | Facility: CLINIC | Age: 50
End: 2025-05-01

## 2025-05-01 RX ORDER — TRETINOIN 0.25 MG/G
CREAM TOPICAL
Qty: 45 G | Refills: 4 | Status: SHIPPED | OUTPATIENT
Start: 2025-05-01

## 2025-05-01 NOTE — TELEPHONE ENCOUNTER
Received fax from [employer]. Prior auth is needed for Tretinoin 0.025% cream.    Please initiate prior auth.    Key:  [id number]

## 2025-05-01 NOTE — TELEPHONE ENCOUNTER
PA for Tretinoin 0.025% cream SUBMITTED to Located within [facility]    via    [x]CMM-KEY: [id number]  []Surescripts-Case ID #   []Availity-Auth ID # NDC #   []Faxed to plan   []Other website   []Phone call Case ID #     [x]PA sent as URGENT    All office notes, labs and other pertaining documents and studies sent. Clinical questions answered. Awaiting determination from insurance company.     Turnaround time for your insurance to make a decision on your Prior Authorization can take 7-21 business days.

## 2025-05-01 NOTE — TELEPHONE ENCOUNTER
Called patient and left a message to see if she can come in to see Dr. Carrera today at 10:40. Advised I am holding the spot for her and to call back to confirm.

## 2025-05-02 ENCOUNTER — PATIENT MESSAGE (OUTPATIENT)
Dept: DERMATOLOGY | Facility: CLINIC | Age: 50
End: 2025-05-02

## 2025-05-02 DIAGNOSIS — F41.9 ANXIETY: ICD-10-CM

## 2025-05-02 NOTE — TELEPHONE ENCOUNTER
Medication: ALPRAZolam (XANAX) 1 mg tablet     Dose/Frequency: Take 1 tablet (1 mg total) by mouth 2 (two) times a day as needed for anxiety or sleep,     Quantity: 45    Pharmacy: [id number] [place]    Office:   [x] PCP/Provider -   [] Speciality/Provider -     Does the patient have enough for 3 days?   [x] Yes   [] No - Send as HP to POD

## 2025-05-05 RX ORDER — ALPRAZOLAM 1 MG/1
1 TABLET ORAL 2 TIMES DAILY PRN
Qty: 45 TABLET | Refills: 0 | Status: SHIPPED | OUTPATIENT
Start: 2025-05-05 | End: 2025-06-04

## 2025-05-05 NOTE — TELEPHONE ENCOUNTER
PA for Tretinoin 0.025% cream  APPROVED     Date(s) approved until 05/01/2026    Case #[id number]    Patient advised by          [x]Make YES! [employer] Message  []Phone call   []LMOM  []L/M to call office as no active Communication consent on file  []Unable to leave detailed message as VM not approved on Communication consent       Pharmacy advised by    [x]Fax  []Phone call  []Secure Chat    Specialty Pharmacy    []     Approval letter scanned into Media Yes

## 2025-05-06 ENCOUNTER — OFFICE VISIT (OUTPATIENT)
Dept: GASTROENTEROLOGY | Facility: CLINIC | Age: 50
End: 2025-05-06
Payer: MEDICARE

## 2025-05-06 VITALS
WEIGHT: 118 LBS | DIASTOLIC BLOOD PRESSURE: 86 MMHG | HEART RATE: 93 BPM | BODY MASS INDEX: 20.91 KG/M2 | HEIGHT: 63 IN | SYSTOLIC BLOOD PRESSURE: 137 MMHG

## 2025-05-06 DIAGNOSIS — K86.89 PANCREATIC INSUFFICIENCY: ICD-10-CM

## 2025-05-06 DIAGNOSIS — K21.00 GASTROESOPHAGEAL REFLUX DISEASE WITH ESOPHAGITIS WITHOUT HEMORRHAGE: Primary | ICD-10-CM

## 2025-05-06 DIAGNOSIS — K31.84 GASTROPARESIS: ICD-10-CM

## 2025-05-06 DIAGNOSIS — Z86.0101 HX OF ADENOMATOUS POLYP OF COLON: ICD-10-CM

## 2025-05-06 PROCEDURE — 99214 OFFICE O/P EST MOD 30 MIN: CPT | Performed by: PHYSICIAN ASSISTANT

## 2025-05-06 NOTE — ASSESSMENT & PLAN NOTE
Currently on Pepcid and Dexilant, still with significant evidence of reflux with failed TIF, did discuss different modalities for treatment of acid reflux, likely regurgitation and aspiration is playing a role as she does have significant gastroparesis, unfortunately patient is already thin because gastric bypass would help with GERD symptoms and significant gastroparesis but obviously not a candidate due to BMI.  Patient otherwise is at higher risk for surgery as she has had hypoxemic respiratory failure but there is concern due to chronic aspiration.  Options would be revision of TIF although physician who did original TIF is not available here, also discussed Linx procedure as well as standard fundoplication but would have to discuss with surgeon candidacy to undergo surgical intervention due to underlying health issues.  Will continue current regimen for reflux.

## 2025-05-06 NOTE — PROGRESS NOTES
Name: Maximilian Mann      : 1975      MRN: [id number]  Encounter Provider: Cesia Sellers PA-C  Encounter Date: 2025   Encounter department: [facility] GASTROENTEROLOGY [place] [address]  :  Assessment & Plan  Gastroesophageal reflux disease with esophagitis without hemorrhage  Currently on Pepcid and Dexilant, still with significant evidence of reflux with failed TIF, did discuss different modalities for treatment of acid reflux, likely regurgitation and aspiration is playing a role as she does have significant gastroparesis, unfortunately patient is already thin because gastric bypass would help with GERD symptoms and significant gastroparesis but obviously not a candidate due to BMI.  Patient otherwise is at higher risk for surgery as she has had hypoxemic respiratory failure but there is concern due to chronic aspiration.  Options would be revision of TIF although physician who did original TIF is not available here, also discussed Linx procedure as well as standard fundoplication but would have to discuss with surgeon candidacy to undergo surgical intervention due to underlying health issues.  Will continue current regimen for reflux.       Gastroparesis  GES--  1.   Markedly delayed gastric emptying primarily at the fundal level.  2.  Gastroesophageal reflux.       Patient was significant gastroparesis as outlined above  Pancreatic insufficiency  Currently on Creon, continue the same       Hx of adenomatous polyp of colon  Colon due     We will see patient back in 6 months but advised to call if any problems or questions arise in the interim.  Spoke with patient and  at length.           History of Present Illness   Maximilian Mann is a 49 y.o. female who presents for follow-up to pH study, as well as EGD.  Patient has history of TIF procedure in , and it appeared that there was a partial gastric wrap but it did not appear that the gastric wrap  was effective anymore and DeMeester score was elevated indicating significant reflux.  Patient was recommended to continue aggressive management of acid reflux but then can consider more definitive intervention.  Patient reports that she has history of lung transplant and notes were reviewed her physician at [facility] was concerned due to aspiration from acid reflux.  Recent notes were reviewed from pulmonologist and they discussed severe reflux as a risk factor for progressive CLAD.  Patient was wondering if she would be a candidate for standard fundoplication and patient was hospitalized earlier this year and had severe dyspnea but had COVID and multifocal pneumonia.  She reports that her reflux symptoms are more controlled since she was able to sleep more upright with her bed.  She reports she is taking the Dexilant in a.m. and famotidine in p.m.  States that her bowels are normal, does take Creon for history of pancreatic insufficiency.    HPI    Review of Systems A complete review of systems is negative other than that noted above in the HPI.      Current Outpatient Medications   Medication Sig Dispense Refill    acetaminophen (TYLENOL) 325 mg tablet Take 3 tablets (975 mg total) by mouth every 8 (eight) hours  0    albuterol (2.5 mg/3 mL) 0.083 % nebulizer solution Take 6 mL (5 mg total) by nebulization 2 (two) times a day  0    albuterol (PROVENTIL HFA,VENTOLIN HFA) 90 mcg/act inhaler INHALE 2 PUFFS EVERY 8 HOURS AS NEEDED FOR SHORTNESS OF BREATH OR WHEEZING.      ALPRAZolam (XANAX) 1 mg tablet Take 1 tablet (1 mg total) by mouth 2 (two) times a day as needed for anxiety or sleep 45 tablet 0    apixaban (ELIQUIS) 5 mg Take 1 tablet (5 mg total) by mouth 2 (two) times a day 30 tablet 0    Ascorbic Acid (vitamin C) 100 MG tablet Take 100 mg by mouth daily      atovaquone (MEPRON) 750 mg/5 mL suspension Take 1,500 mg by mouth daily      azelastine (OPTIVAR) 0.05 % ophthalmic solution INSTILL 1 DROP INTO BOTH EYES  TWICE A DAY 18 mL 1    azithromycin (ZITHROMAX) 250 mg tablet Take 250 mg by mouth 3 (three) times a week      BD Pen Needle Abi U/F 32G X 4 MM MISC 4 times daily 400 each 1    bisacodyl (DULCOLAX) 5 mg EC tablet Take 1 tablet (5 mg total) by mouth daily at bedtime 30 tablet 0    budesonide-formoterol (SYMBICORT) 160-4.5 mcg/act inhaler Inhale 2 puffs every 12 (twelve) hours      Cholecalciferol (Vitamin D3) 125 MCG (5000 UT) TABS Take 5,000 Units by mouth daily      Continuous Blood Gluc  (Dexcom G7 ) SUKHI Use 1 each continuous 1 each 0    Continuous Glucose Sensor (Dexcom G7 Sensor) Use 1 Device every 10 days 3 each 5    cyclobenzaprine (FLEXERIL) 5 mg tablet Take 1 tablet (5 mg total) by mouth 3 (three) times a day as needed for muscle spasms for up to 14 days 24 tablet 0    dexlansoprazole (DEXILANT) 60 MG capsule Take 1 capsule (60 mg total) by mouth daily 100 capsule 1    dronabinol (MARINOL) 5 MG capsule Take 1 capsule (5 mg total) by mouth 3 (three) times a day 90 capsule 0    escitalopram (LEXAPRO) 5 mg tablet Take 1 tablet (5 mg total) by mouth every other day      famotidine (PEPCID) 40 MG tablet TAKE 1 TABLET BY MOUTH EVERYDAY AT BEDTIME 90 tablet 1    ferrous sulfate 325 (65 Fe) mg tablet TAKE 1 TABLET BY MOUTH DAILY WITH LUNCH.      fluconazole (DIFLUCAN) 150 mg tablet Take 1 tablet by mouth in the morning      furosemide (LASIX) 20 mg tablet Take 40 mg by mouth daily      Hydrocod Edgar-Chlorphe Edgar ER (TUSSIONEX) 10-8 mg/5 mL ER suspension Take 5 mL by mouth every 12 (twelve) hours as needed for cough Max Daily Amount: 10 mL 70 mL 0    insulin aspart (NovoLOG FlexPen) 100 UNIT/ML injection pen INJECT 10 UNITS pre-BREAKFAST, 8 UNITS pre-LUNCH and 12 UNITS pre-DINNER plus scale; max 50 UNITS/day 45 mL 1    insulin degludec (Tresiba FlexTouch) 100 units/mL injection pen Inject 12 Units under the skin daily 15 mL 1    Insulin Disposable Pump (Omnipod 5 G6 Intro, Gen 5,) KIT Use 1 Units  continuous 1 kit 0    Insulin Disposable Pump (Omnipod 5 G6 Pods, Gen 5,) MISC Use 1 Units every other day 15 each 3    insulin glargine (LANTUS) 100 units/mL subcutaneous injection Inject 10 Units under the skin daily      Insulin Pen Needle (Comfort EZ Pen Needles) 33G X 4 MM MISC Use to inject insulin 4 times a day 100 each 3    linezolid (ZYVOX) 600 mg tablet Take 600 mg by mouth 2 (two) times a day      magnesium gluconate (MAGONATE) 500 mg tablet Take 500 mg by mouth 2 (two) times a day      naloxone (NARCAN) 4 mg/0.1 mL nasal spray Administer 1 spray into a nostril. If no response after 2-3 minutes, give another dose in the other nostril using a new spray. 1 each 1    ondansetron (ZOFRAN) 4 mg tablet Take 4 mg by mouth every 8 (eight) hours as needed for nausea      oxybutynin (DITROPAN XL) 15 MG 24 hr tablet Take 1 tablet (15 mg total) by mouth daily at bedtime 90 tablet 3    oxyCODONE (ROXICODONE) 10 MG TABS Take 1 tablet (10 mg total) by mouth every 4 (four) hours as needed for moderate pain Max Daily Amount: 60 mg 180 tablet 0    pancrelipase, Lip-Prot-Amyl, (CREON) 24,000 units Take 48,000 Units by mouth 3 (three) times a day with meals      pantoprazole (PROTONIX) 40 mg tablet Take 1 tablet (40 mg total) by mouth 2 (two) times a day before meals 60 tablet 0    polyethylene glycol (MIRALAX) 17 g packet Take 17 g by mouth 2 (two) times a day  0    predniSONE 10 mg tablet Take 1.5 tablets by mouth in the morning      pregabalin (LYRICA) 150 mg capsule Take 1 capsule (150 mg total) by mouth daily 90 capsule 1    Respiratory Therapy Supplies (Nebulizer) SUKHI       Sodium Chloride 7 % NEBU TAKE 4 ML USING NEBULIZER EVERY 12 HOURS AS NEEDED (FOR AIRWAY CLEARANCE).      tacrolimus (PROGRAF) 1 mg capsule Take 3 capsules (3 mg total) by mouth 2 (two) times a day      tretinoin (RETIN-A) 0.025 % cream Apply pea sized amount to entire face nightly and follow with moisturizer 45 g 4    Turmeric 500 MG CAPS Take by  mouth      valACYclovir (VALTREX) 500 mg tablet Take 500 mg by mouth daily      vitamin B-12 (VITAMIN B-12) 1,000 mcg tablet Take by mouth daily       No current facility-administered medications for this visit.     Objective   There were no vitals taken for this visit.    Physical Exam   General Alert no acute distress  Heart normal S1-S2  Lungs absent breath sounds on right  Abdomen soft positive bowel sounds nontender nondistended      Lab Results: I personally reviewed relevant lab results.       Results for orders placed during the hospital encounter of 03/04/25    EGD    Impression  Irregular Z-line  Small type I hiatal hernia  Erythematous mucosa in the GE junction  Abnormal mucosa with plaque in the esophagus; performed cold forceps biopsy  The duodenal bulb, 1st part of the duodenum and 2nd part of the duodenum appeared normal.  Previous TIF  A pH capsule was placed successfully in the esophagus (6 cm from the GE junction).      RECOMMENDATION:  Await pathology results  Will await 96-hour esophageal pH monitoring results and then plan from there.  Resume Eliquis on 3/7/2025.            Justin Ko MD  pH STUDY:     Day 1:  Number of reflux episodes: 18  Overall acid exposure time (%): 14.5     Day 2:  Number of reflux episodes: 10  Overall acid exposure time (%): 12.2     DeMeester score: 31.2  Symptom correlation significant for any reported symptoms: (SI > 50% and SAP > 95%):  Yes        FINAL DIAGNOSIS:  Patient had significant acid reflux during this study, DeMeester score being 31.2, normal being less than 14.7 consistent with severe acid reflux.  Acid exposure time was 11.2% in upright position and 5.0% in supine position.  Normal being less than 7 point try and upright and less than 1.4 supine positions  Her symptom association probability was 99.6 with heartburn and 94.4 with a chest pain.  Esophageal pH monitoring study is consistent with severe acid reflux as per the DeMeester score as well as  in upright and supine positions.  Her symptoms of heartburn and chest pain are related to acid reflux in the esophagus.     Continue aggressive management of acid reflux with lifestyle modifications and acid reducers.  If this does not manage her symptoms satisfactorily then more definitive intervention may have to be considered.      Colonoscopy- 2023-  IMPRESSION:  Subcentimeter adenomatous-appearing polyp in the proximal ascending colon was removed with cold forceps biopsy  The ileocecal valve, cecum, hepatic flexure, transverse colon, splenic flexure, descending colon, sigmoid colon, rectosigmoid and rectum appeared normal.  2 small (grade 2) hemorrhoids  Suboptimal prep with liquid stool throughout the colon           RECOMMENDATION:    Repeat colonoscopy in 3 years     Inflammatory bowel disease  Personal history of colon polyps  Inadequate bowel preparation   Path-   C. Large Intestine, Right/Ascending Colon, cold biospy ascending colon:      - Tubular adenoma      - No high-grade dysplasia and no evidence of malignancy

## 2025-05-06 NOTE — ASSESSMENT & PLAN NOTE
GES-2022-  1.   Markedly delayed gastric emptying primarily at the fundal level.  2.  Gastroesophageal reflux.       Patient was significant gastroparesis as outlined above

## 2025-05-10 ENCOUNTER — HOSPITAL ENCOUNTER (EMERGENCY)
Facility: HOSPITAL | Age: 50
Discharge: HOME | End: 2025-05-11
Attending: EMERGENCY MEDICINE | Admitting: EMERGENCY MEDICINE
Payer: COMMERCIAL

## 2025-05-10 ENCOUNTER — APPOINTMENT (EMERGENCY)
Dept: CT IMAGING | Facility: HOSPITAL | Age: 50
End: 2025-05-10
Payer: COMMERCIAL

## 2025-05-10 ENCOUNTER — APPOINTMENT (EMERGENCY)
Dept: RADIOLOGY | Facility: HOSPITAL | Age: 50
End: 2025-05-10
Payer: COMMERCIAL

## 2025-05-10 DIAGNOSIS — M54.9 UPPER BACK PAIN ON LEFT SIDE: Primary | ICD-10-CM

## 2025-05-10 DIAGNOSIS — Z94.2 LUNG REPLACED BY TRANSPLANT (HCC): ICD-10-CM

## 2025-05-10 DIAGNOSIS — R06.02 SHORTNESS OF BREATH: ICD-10-CM

## 2025-05-10 LAB
ALBUMIN SERPL BCG-MCNC: 4.2 G/DL (ref 3.5–5)
ALP SERPL-CCNC: 62 U/L (ref 34–104)
ALT SERPL W P-5'-P-CCNC: 16 U/L (ref 7–52)
ANION GAP SERPL CALCULATED.3IONS-SCNC: 8 MMOL/L (ref 4–13)
APTT PPP: 28 SECONDS (ref 23–34)
AST SERPL W P-5'-P-CCNC: 13 U/L (ref 13–39)
BASOPHILS # BLD AUTO: 0.01 THOUSANDS/ÂΜL (ref 0–0.1)
BASOPHILS NFR BLD AUTO: 0 % (ref 0–1)
BILIRUB DIRECT SERPL-MCNC: 0.08 MG/DL (ref 0–0.2)
BILIRUB SERPL-MCNC: 0.51 MG/DL (ref 0.2–1)
BNP SERPL-MCNC: 394 PG/ML (ref 0–100)
BUN SERPL-MCNC: 27 MG/DL (ref 5–25)
CALCIUM SERPL-MCNC: 9.4 MG/DL (ref 8.4–10.2)
CARDIAC TROPONIN I PNL SERPL HS: 13 NG/L (ref ?–50)
CHLORIDE SERPL-SCNC: 97 MMOL/L (ref 96–108)
CO2 SERPL-SCNC: 25 MMOL/L (ref 21–32)
CREAT SERPL-MCNC: 1.46 MG/DL (ref 0.6–1.3)
EOSINOPHIL # BLD AUTO: 0.01 THOUSAND/ÂΜL (ref 0–0.61)
EOSINOPHIL NFR BLD AUTO: 0 % (ref 0–6)
ERYTHROCYTE [DISTWIDTH] IN BLOOD BY AUTOMATED COUNT: 16.2 % (ref 11.6–15.1)
FLUAV RNA RESP QL NAA+PROBE: NEGATIVE
FLUBV RNA RESP QL NAA+PROBE: NEGATIVE
GFR SERPL CREATININE-BSD FRML MDRD: 42 ML/MIN/1.73SQ M
GLUCOSE SERPL-MCNC: 313 MG/DL (ref 65–140)
HCT VFR BLD AUTO: 44.5 % (ref 34.8–46.1)
HGB BLD-MCNC: 14 G/DL (ref 11.5–15.4)
IMM GRANULOCYTES # BLD AUTO: 0.03 THOUSAND/UL (ref 0–0.2)
IMM GRANULOCYTES NFR BLD AUTO: 0 % (ref 0–2)
INR PPP: 1.09 (ref 0.85–1.19)
LACTATE SERPL-SCNC: 1.3 MMOL/L (ref 0.5–2)
LYMPHOCYTES # BLD AUTO: 1.06 THOUSANDS/ÂΜL (ref 0.6–4.47)
LYMPHOCYTES NFR BLD AUTO: 16 % (ref 14–44)
MCH RBC QN AUTO: 29.2 PG (ref 26.8–34.3)
MCHC RBC AUTO-ENTMCNC: 31.5 G/DL (ref 31.4–37.4)
MCV RBC AUTO: 93 FL (ref 82–98)
MONOCYTES # BLD AUTO: 0.36 THOUSAND/ÂΜL (ref 0.17–1.22)
MONOCYTES NFR BLD AUTO: 5 % (ref 4–12)
NEUTROPHILS # BLD AUTO: 5.26 THOUSANDS/ÂΜL (ref 1.85–7.62)
NEUTS SEG NFR BLD AUTO: 79 % (ref 43–75)
NRBC BLD AUTO-RTO: 0 /100 WBCS
PLATELET # BLD AUTO: 176 THOUSANDS/UL (ref 149–390)
PMV BLD AUTO: 10.1 FL (ref 8.9–12.7)
POTASSIUM SERPL-SCNC: 5.3 MMOL/L (ref 3.5–5.3)
PROCALCITONIN SERPL-MCNC: <0.05 NG/ML
PROT SERPL-MCNC: 7.6 G/DL (ref 6.4–8.4)
PROTHROMBIN TIME: 14.8 SECONDS (ref 12.3–15)
RBC # BLD AUTO: 4.8 MILLION/UL (ref 3.81–5.12)
RSV RNA RESP QL NAA+PROBE: NEGATIVE
SARS-COV-2 RNA RESP QL NAA+PROBE: NEGATIVE
SODIUM SERPL-SCNC: 130 MMOL/L (ref 135–147)
WBC # BLD AUTO: 6.73 THOUSAND/UL (ref 4.31–10.16)

## 2025-05-10 PROCEDURE — 85730 THROMBOPLASTIN TIME PARTIAL: CPT | Performed by: EMERGENCY MEDICINE

## 2025-05-10 PROCEDURE — 99285 EMERGENCY DEPT VISIT HI MDM: CPT

## 2025-05-10 PROCEDURE — 80076 HEPATIC FUNCTION PANEL: CPT | Performed by: EMERGENCY MEDICINE

## 2025-05-10 PROCEDURE — 0241U HB NFCT DS VIR RESP RNA 4 TRGT: CPT | Performed by: EMERGENCY MEDICINE

## 2025-05-10 PROCEDURE — 84484 ASSAY OF TROPONIN QUANT: CPT | Performed by: EMERGENCY MEDICINE

## 2025-05-10 PROCEDURE — 71250 CT THORAX DX C-: CPT

## 2025-05-10 PROCEDURE — 83880 ASSAY OF NATRIURETIC PEPTIDE: CPT | Performed by: EMERGENCY MEDICINE

## 2025-05-10 PROCEDURE — 83605 ASSAY OF LACTIC ACID: CPT | Performed by: EMERGENCY MEDICINE

## 2025-05-10 PROCEDURE — 94640 AIRWAY INHALATION TREATMENT: CPT

## 2025-05-10 PROCEDURE — 71046 X-RAY EXAM CHEST 2 VIEWS: CPT

## 2025-05-10 PROCEDURE — 80048 BASIC METABOLIC PNL TOTAL CA: CPT | Performed by: EMERGENCY MEDICINE

## 2025-05-10 PROCEDURE — 99285 EMERGENCY DEPT VISIT HI MDM: CPT | Performed by: EMERGENCY MEDICINE

## 2025-05-10 PROCEDURE — 84145 PROCALCITONIN (PCT): CPT | Performed by: EMERGENCY MEDICINE

## 2025-05-10 PROCEDURE — 93005 ELECTROCARDIOGRAM TRACING: CPT

## 2025-05-10 PROCEDURE — 85610 PROTHROMBIN TIME: CPT | Performed by: EMERGENCY MEDICINE

## 2025-05-10 PROCEDURE — 85025 COMPLETE CBC W/AUTO DIFF WBC: CPT | Performed by: EMERGENCY MEDICINE

## 2025-05-10 RX ORDER — HYDROMORPHONE HCL/PF 1 MG/ML
0.5 SYRINGE (ML) INJECTION ONCE
Status: COMPLETED | OUTPATIENT
Start: 2025-05-10 | End: 2025-05-11

## 2025-05-10 RX ORDER — ALBUTEROL SULFATE 0.83 MG/ML
2.5 SOLUTION RESPIRATORY (INHALATION) ONCE
Status: COMPLETED | OUTPATIENT
Start: 2025-05-10 | End: 2025-05-10

## 2025-05-10 RX ADMIN — ALBUTEROL SULFATE 2.5 MG: 2.5 SOLUTION RESPIRATORY (INHALATION) at 21:32

## 2025-05-10 RX ADMIN — IPRATROPIUM BROMIDE 0.5 MG: 0.5 SOLUTION RESPIRATORY (INHALATION) at 21:32

## 2025-05-11 VITALS
HEART RATE: 68 BPM | TEMPERATURE: 97.9 F | OXYGEN SATURATION: 95 % | SYSTOLIC BLOOD PRESSURE: 139 MMHG | DIASTOLIC BLOOD PRESSURE: 87 MMHG | RESPIRATION RATE: 18 BRPM

## 2025-05-11 LAB
2HR DELTA HS TROPONIN: 1 NG/L
ATRIAL RATE: 89 BPM
CARDIAC TROPONIN I PNL SERPL HS: 14 NG/L (ref ?–50)
P AXIS: 62 DEGREES
PR INTERVAL: 138 MS
QRS AXIS: 63 DEGREES
QRSD INTERVAL: 68 MS
QT INTERVAL: 360 MS
QTC INTERVAL: 438 MS
T WAVE AXIS: 68 DEGREES
VENTRICULAR RATE: 89 BPM

## 2025-05-11 PROCEDURE — 96374 THER/PROPH/DIAG INJ IV PUSH: CPT

## 2025-05-11 PROCEDURE — 93010 ELECTROCARDIOGRAM REPORT: CPT | Performed by: INTERNAL MEDICINE

## 2025-05-11 PROCEDURE — 84484 ASSAY OF TROPONIN QUANT: CPT | Performed by: EMERGENCY MEDICINE

## 2025-05-11 RX ORDER — HEPARIN 100 UNIT/ML
500 SYRINGE INTRAVENOUS ONCE
Status: COMPLETED | OUTPATIENT
Start: 2025-05-11 | End: 2025-05-11

## 2025-05-11 RX ADMIN — HYDROMORPHONE HYDROCHLORIDE 0.5 MG: 1 INJECTION, SOLUTION INTRAMUSCULAR; INTRAVENOUS; SUBCUTANEOUS at 00:27

## 2025-05-11 RX ADMIN — HEPARIN 500 UNITS: 100 SYRINGE at 04:25

## 2025-05-11 NOTE — ED PROVIDER NOTES
Time reflects when diagnosis was documented in both MDM as applicable and the Disposition within this note       Time User Action Codes Description Comment    5/11/2025  2:56 AM Yanique Funk [M54.9] Upper back pain on left side     5/11/2025  2:56 AM Yanique Funk Add [R06.02] Short of breath on exertion     5/11/2025  2:57 AM Yanique Funk Remove [R06.02] Short of breath on exertion     5/11/2025  2:57 AM Yanique Funk Add [R06.02] Shortness of breath     5/11/2025  2:57 AM Yanique Funk [Z94.2] Lung replaced by transplant (HCC)           ED Disposition       ED Disposition   Discharge    Condition   Fair    Date/Time   Sun May 11, 2025  2:56 AM    Comment   Maximilian Mann discharge to home/self care.             Assessment & Plan       Medical Decision Making  This is a 49-year-old female who presents here today with shortness of breath.  She has a history of lung transplant, and says for the past several days has been having pain in the left side by the shoulder blade that feels somewhat similar to when she has had a pneumothorax before but not quite the same.  She has been short of breath for several months since she last had pneumonia, but has been progressively worsening in the past couple of days.  She denies any trauma to the area, heavy lifting or moving.  She denies any fevers.  She is having some coughing.  There is no actual chest pain.  She has been taking her normal pain medications but nothing else for symptoms.  She says she was on an antibiotic about a month ago for URI symptoms which did seem to improve.    Review of systems, otherwise negative unless stated as above    She is well-appearing, no acute distress.  Exam is unremarkable.  Concern is for possible pneumothorax, infection, muscular pain, ACS, less likely recurrent PE as she is anticoagulated, complications related to the lung transplant or  rejection.  Will check lab work and imaging to evaluate.    Chest x-ray was reviewed by myself, and shows pneumonectomy with hyperinflated left lung, no pneumothorax or obvious signs of infection.  She has a baseline mild fluctuations of creatinine is the higher end of her normal today, but no acute GEOFF.  BNP is mildly elevated with no prior for comparison.  Lab work, including lactic acid and procalcitonin, are unremarkable.  CT scan shows no signs of pneumonia.  Lab work is otherwise unremarkable.  She has had mild improvement of symptoms, but not significantly.  However, given unremarkable workup and baseline vital signs with progressive shortness of breath over months, we will do trial of further outpatient therapy.  I discussed with her continued management of her symptoms, need to follow-up closely with her transplant team for further recommendations and evaluation, and indications for return, she expresses understanding with this plan.    Problems Addressed:  Lung replaced by transplant (HCC): chronic illness or injury with exacerbation, progression, or side effects of treatment  Shortness of breath: acute illness or injury  Upper back pain on left side: acute illness or injury    Amount and/or Complexity of Data Reviewed  Labs: ordered. Decision-making details documented in ED Course.  Radiology: ordered and independent interpretation performed. Decision-making details documented in ED Course.  ECG/medicine tests: ordered and independent interpretation performed. Decision-making details documented in ED Course.    Risk  Prescription drug management.           Medications   albuterol inhalation solution 2.5 mg (2.5 mg Nebulization Given 5/10/25 2132)   ipratropium (ATROVENT) 0.02 % inhalation solution 0.5 mg (0.5 mg Nebulization Given 5/10/25 2132)   HYDROmorphone (DILAUDID) injection 0.5 mg (0.5 mg Intravenous Given 5/11/25 0027)   heparin flush PF (porcine) 100 units/mL injection 500 Units (500 Units  Intracatheter Given 5/11/25 0425)       ED Risk Strat Scores                    No data recorded        SBIRT 20yo+      Flowsheet Row Most Recent Value   Initial Alcohol Screen: US AUDIT-C     1. How often do you have a drink containing alcohol? 0 Filed at: 05/10/2025 2055   2. How many drinks containing alcohol do you have on a typical day you are drinking?  0 Filed at: 05/10/2025 2055   3a. Male UNDER 65: How often do you have five or more drinks on one occasion? 0 Filed at: 05/10/2025 2055   3b. FEMALE Any Age, or MALE 65+: How often do you have 4 or more drinks on one occassion? 0 Filed at: 05/10/2025 2055   Audit-C Score 0 Filed at: 05/10/2025 2055   NICOLE: How many times in the past year have you...    Used an illegal drug or used a prescription medication for non-medical reasons? Never Filed at: 05/10/2025 2055                            History of Present Illness       Chief Complaint   Patient presents with   • Shortness of Breath     Pt reports for the past 3-4 days having left sided back pain where her lung is and feeling more short of breath. Pt reports being in chronic rejection for approx 6-9 months. Pt reports right lung removed approx 3 years ago.    • Back Pain       Past Medical History:   Diagnosis Date   • Abnormal uterine bleeding (AUB) 7/7/2022   • ABPA (allergic bronchopulmonary aspergillosis) ([facility]) 07/08/2012   • Acute kidney injury ([facility]) 09/08/2017   • Acute on chronic respiratory failure ([facility]) 03/04/2017    Last Assessment & Plan:  Formatting of this note might be different from the original. Recurrent with clinical worsening with enlarging PTX, s/p multiple chest tubes, but new fevers 6/19 and worsening leukocytosis indicating infection as cause of 6/19 decompensation with profound hypoxia in setting of shunting through right lung and continued pneumothorax s/p 2nd chest tube placement.  6/27: Right   • Anaphylaxis 06/06/2022   • Anemia    • Asthma    • Chest pain 09/21/2021   • Chronic  pain    • Chronic respiratory failure with hypoxia (HCC) 09/07/2021   • COVID-19 06/03/2022   • Cystic fibrosis (HCC)    • Deep vein thrombosis (HCC)    • Diabetes mellitus (HCC)    • Fever 09/22/2024   • GERD (gastroesophageal reflux disease)    • HBP (high blood pressure)    • HCAP (healthcare-associated pneumonia) 03/06/2020   • History of transfusion    • Influenza B 02/23/2020   • Kidney stone    • Moderate protein-calorie malnutrition (HCC) 01/08/2022   • Neutropenic fever  (HCC) 09/26/2020   • Pneumonia due to Pseudomonas species ([facility]) 10/28/2010    Last Assessment & Plan:  Formatting of this note might be different from the original. Polymicrobial pna w/ pseudomonas, M. Abscessus, Cadida glabrata/albicans   • PONV (postoperative nausea and vomiting)    • Pseudomonas aeruginosa infection 09/20/2012   • Renal calculus    • Squamous cell skin cancer 01/12/2023    SCCIS- Left upper thigh   • Transplant recipient     Lung- 2013, 2017   • Ulcerative colitis (HCC)    • Wellness examination 02/04/2013    Formatting of this note might be different from the original. Discussed with patient and acknowledgement form signed on 2/4/2013 (ALYSHA)      Past Surgical History:   Procedure Laterality Date   • APPENDECTOMY     • AUGMENTATION MAMMAPLASTY Bilateral 2004   • AUGMENTATION MAMMAPLASTY Right 2021    removed    • BREAST SURGERY      Aug.    • BRONCHOSCOPY     • CHOLECYSTECTOMY     • COLONOSCOPY     • EGD  07/2019    [facility]    • FL RETROGRADE PYELOGRAM  01/09/2020   • FL RETROGRADE PYELOGRAM  02/04/2020   • FL RETROGRADE PYELOGRAM  9/20/2024   • GASTROSCOPY  03/01/2024   • GASTROSTOMY TUBE, PLACE     • IR BALLOON-OCCLUDED ANTEGRADE TRANSVENOUS OBLITERATION (BATO)  11/22/2022   • IR PORT PLACEMENT  03/22/2022   • IR PORT REMOVAL  01/10/2022   • IR PORT STRIPPING  11/25/2022   • IR TUNNELED CENTRAL LINE PLACEMENT  01/13/2022   • LUNG SURGERY     • LUNG TRANSPLANT      X2   • ND CYSTO/URETERO W/LITHOTRIPSY &INDWELL  STENT INSRT Right 2020    Procedure: CYSTOSCOPY URETEROSCOPY WITH LITHOTRIPSY HOLMIUM LASER, RETROGRADE PYELOGRAM AND INSERTION STENT URETERAL;  Surgeon: Patricio Perry MD;  Location: [facility];  Service: Urology   • SD CYSTO/URETERO W/LITHOTRIPSY &INDWELL STENT INSRT Left 2024    Procedure: CYSTOSCOPY URETEROSCOPY WITH LITHOTRIPSY HOLMIUM LASER, RETROGRADE PYELOGRAM AND INSERTION STENT URETERAL, BASKET STONE EXTRACTION;  Surgeon: Lee Briceno MD;  Location:  MAIN OR;  Service: Urology   • SD CYSTOURETHROSCOPY W/URETERAL CATHETERIZATION Right 2020    Procedure: CYSTOSCOPY RETROGRADE PYELOGRAM WITH INSERTION STENT URETERAL;  Surgeon: Patricio Perry MD;  Location: [facility];  Service: Urology   • SKIN BIOPSY     • US GUIDED VASCULAR ACCESS  2017      Family History   Problem Relation Age of Onset   • Alcohol abuse Mother    • Mental illness Mother    • Depression Mother    • Alcohol abuse Father    • COPD Maternal Grandmother    • Lung cancer Maternal Grandmother 65   • Cancer Maternal Grandmother    • COPD Maternal Grandfather    • Cancer Maternal Grandfather    • COPD Paternal Grandmother    • Lung cancer Paternal Grandmother 65   • No Known Problems Sister    • No Known Problems Paternal Aunt    • Breast cancer Neg Hx    • Ovarian cancer Neg Hx    • Colon cancer Neg Hx       Social History     Tobacco Use   • Smoking status: Former     Current packs/day: 0.00     Average packs/day: 0.5 packs/day for 12.0 years (6.0 ttl pk-yrs)     Types: Cigarettes     Start date: 1993     Quit date: 2005     Years since quittin.3     Passive exposure: Past   • Smokeless tobacco: Never   Vaping Use   • Vaping status: Never Used   Substance Use Topics   • Alcohol use: Yes     Alcohol/week: 2.0 standard drinks of alcohol     Types: 2 Glasses of wine per week     Comment: social   • Drug use: Yes     Frequency: 2.0 times per week     Types: Marijuana     Comment: medical edible  marijuana prescribed      E-Cigarette/Vaping   • E-Cigarette Use Never User       E-Cigarette/Vaping Substances   • Nicotine No    • THC No    • CBD No    • Flavoring No    • Other No    • Unknown No       I have reviewed and agree with the history as documented.       Shortness of Breath  Back Pain      Review of Systems   Respiratory:  Positive for shortness of breath.    Musculoskeletal:  Positive for back pain.           Objective       ED Triage Vitals   Temperature Pulse Blood Pressure Respirations SpO2 Patient Position - Orthostatic VS   05/10/25 2052 05/10/25 2052 05/10/25 2052 05/10/25 2052 05/10/25 2052 05/10/25 2052   97.9 °F (36.6 °C) 92 168/85 18 95 % Sitting      Temp Source Heart Rate Source BP Location FiO2 (%) Pain Score    05/10/25 2052 05/10/25 2052 05/10/25 2052 -- 05/11/25 0000    Temporal Monitor Left arm  No Pain      Vitals      Date and Time Temp Pulse SpO2 Resp BP Pain Score FACES Pain Rating User   05/11/25 0300 -- 68 95 % 18 139/87 -- -- KM   05/11/25 0200 -- 67 94 % 15 122/82 5 -- KO   05/11/25 0130 -- 70 95 % 17 121/82 5 -- KO   05/11/25 0100 -- 74 95 % 20 116/81 5 -- KO   05/11/25 0030 -- 77 95 % 22 131/91 -- -- KO   05/11/25 0027 -- -- -- -- -- 7 -- KO   05/11/25 0000 -- 76 96 % 22 129/79 No Pain -- KO   05/10/25 2300 -- 78 94 % 18 135/82 -- -- KO   05/10/25 2200 -- 84 94 % 22 144/89 -- -- KO   05/10/25 2130 -- 82 95 % 20 140/91 -- -- KO   05/10/25 2052 97.9 °F (36.6 °C) 92 95 % 18 168/85 -- -- JK            Physical Exam  Vitals and nursing note reviewed.   Constitutional:       General: She is not in acute distress.     Appearance: She is well-developed.   HENT:      Head: Normocephalic and atraumatic.     Eyes:      Conjunctiva/sclera: Conjunctivae normal.      Pupils: Pupils are equal, round, and reactive to light.     Neck:      Trachea: No tracheal deviation.     Cardiovascular:      Rate and Rhythm: Normal rate and regular rhythm.      Heart sounds: Normal heart sounds.    Pulmonary:      Effort: Pulmonary effort is normal. No respiratory distress.          Comments: Transmitted and distant air sounds on the right due to history of pneumonectomy. Mild wheezing left. Speaking easily full sentences, no conversational dyspnea.  Abdominal:      General: There is no distension.      Palpations: Abdomen is soft.      Tenderness: There is no abdominal tenderness.     Musculoskeletal:      Cervical back: Normal range of motion.     Skin:     General: Skin is warm and dry.     Neurological:      Mental Status: She is alert and oriented to person, place, and time.      GCS: GCS eye subscore is 4. GCS verbal subscore is 5. GCS motor subscore is 6.     Psychiatric:         Mood and Affect: Mood and affect normal.         Behavior: Behavior normal.       Results Reviewed       Procedure Component Value Units Date/Time    HS Troponin I 2hr [[id number]]  (Normal) Collected: 05/11/25 0024    Lab Status: Final result Specimen: Blood from Central Venous Line Updated: 05/11/25 0101     hs TnI 2hr 14 ng/L      Delta 2hr hsTnI 1 ng/L     FLU/RSV/COVID - if FLU/RSV clinically relevant (2hr TAT) [[id number]]  (Normal) Collected: 05/10/25 2304    Lab Status: Final result Specimen: Nares from Nose Updated: 05/10/25 2345     SARS-CoV-2 Negative     INFLUENZA A PCR Negative     INFLUENZA B PCR Negative     RSV PCR Negative    Narrative:      This test has been performed using the CoV-2/Flu/RSV plus assay on the [employer] GeneXpert platform. This test has been validated by the  and verified by the performing laboratory.     This test is designed to amplify and detect the following: nucleocapsid (N), envelope (E), and RNA-dependent RNA polymerase (RdRP) genes of the SARS-CoV-2 genome; matrix (M), basic polymerase (PB2), and acidic protein (PA) segments of the influenza A genome; matrix (M) and non-structural protein (NS) segments of the influenza B genome, and the nucleocapsid genes of RSV A and RSV B.      Positive results are indicative of the presence of Flu A, Flu B, RSV, and/or SARS-CoV-2 RNA. Positive results for SARS-CoV-2 or suspected novel influenza should be reported to state, local, or federal health departments according to local reporting requirements.      All results should be assessed in conjunction with clinical presentation and other laboratory markers for clinical management.     FOR PEDIATRIC PATIENTS - copy/paste COVID Guidelines URL to browser: [email or web address]       Procalcitonin [[id number]]  (Normal) Collected: 05/10/25 2206    Lab Status: Final result Specimen: Blood from Arm, Right Updated: 05/10/25 2244     Procalcitonin <0.05 ng/ml     HS Troponin 0hr (reflex protocol) [[id number]]  (Normal) Collected: 05/10/25 2206    Lab Status: Final result Specimen: Blood from Central Venous Line Updated: 05/10/25 2240     hs TnI 0hr 13 ng/L     B-Type Natriuretic Peptide(BNP) [[id number]]  (Abnormal) Collected: 05/10/25 2206    Lab Status: Final result Specimen: Blood from Line Updated: 05/10/25 2240      pg/mL     CBC and differential [[id number]]  (Abnormal) Collected: 05/10/25 2206    Lab Status: Final result Specimen: Blood from Central Venous Line Updated: 05/10/25 2236     WBC 6.73 Thousand/uL      RBC 4.80 Million/uL      Hemoglobin 14.0 g/dL      Hematocrit 44.5 %      MCV 93 fL      MCH 29.2 pg      MCHC 31.5 g/dL      RDW 16.2 %      MPV 10.1 fL      Platelets 176 Thousands/uL      nRBC 0 /100 WBCs      Segmented % 79 %      Immature Grans % 0 %      Lymphocytes % 16 %      Monocytes % 5 %      Eosinophils Relative 0 %      Basophils Relative 0 %      Absolute Neutrophils 5.26 Thousands/µL      Absolute Immature Grans 0.03 Thousand/uL      Absolute Lymphocytes 1.06 Thousands/µL      Absolute Monocytes 0.36 Thousand/µL      Eosinophils Absolute 0.01 Thousand/µL      Basophils Absolute 0.01 Thousands/µL     Basic metabolic panel  [[id number]]  (Abnormal) Collected: 05/10/25 2206    Lab Status: Final result Specimen: Blood from Line, Venous Updated: 05/10/25 2234     Sodium 130 mmol/L      Potassium 5.3 mmol/L      Chloride 97 mmol/L      CO2 25 mmol/L      ANION GAP 8 mmol/L      BUN 27 mg/dL      Creatinine 1.46 mg/dL      Glucose 313 mg/dL      Calcium 9.4 mg/dL      eGFR 42 ml/min/1.73sq m     Narrative:      National Kidney Disease Foundation guidelines for Chronic Kidney Disease (CKD):   •  Stage 1 with normal or high GFR (GFR > 90 mL/min/1.73 square meters)  •  Stage 2 Mild CKD (GFR = 60-89 mL/min/1.73 square meters)  •  Stage 3A Moderate CKD (GFR = 45-59 mL/min/1.73 square meters)  •  Stage 3B Moderate CKD (GFR = 30-44 mL/min/1.73 square meters)  •  Stage 4 Severe CKD (GFR = 15-29 mL/min/1.73 square meters)  •  Stage 5 End Stage CKD (GFR <15 mL/min/1.73 square meters)  Note: GFR calculation is accurate only with a steady state creatinine    Hepatic function panel [[id number]]  (Normal) Collected: 05/10/25 2206    Lab Status: Final result Specimen: Blood from Line, Venous Updated: 05/10/25 2234     Total Bilirubin 0.51 mg/dL      Bilirubin, Direct 0.08 mg/dL      Alkaline Phosphatase 62 U/L      AST 13 U/L      ALT 16 U/L      Total Protein 7.6 g/dL      Albumin 4.2 g/dL     Lactic acid, plasma (w/reflex if result > 2.0) [[id number]]  (Normal) Collected: 05/10/25 2206    Lab Status: Final result Specimen: Blood from Central Venous Line Updated: 05/10/25 2234     LACTIC ACID 1.3 mmol/L     Narrative:      Result may be elevated if tourniquet was used during collection.    Protime-INR [[id number]]  (Normal) Collected: 05/10/25 2206    Lab Status: Final result Specimen: Blood from Arm, Right Updated: 05/10/25 2230     Protime 14.8 seconds      INR 1.09    Narrative:      INR Therapeutic Range    Indication                                             INR Range      Atrial Fibrillation                                                2.0-3.0  Hypercoagulable State                                    2.0.2.3  Left Ventricular Asist Device                            2.0-3.0  Mechanical Heart Valve                                  -    Aortic(with afib, MI, embolism, HF, LA enlargement,    and/or coagulopathy)                                     2.0-3.0 (2.5-3.5)     Mitral                                                             2.5-3.5  Prosthetic/Bioprosthetic Heart Valve               2.0-3.0  Venous thromboembolism (VTE: VT, PE        2.0-3.0    APTT [[id number]]  (Normal) Collected: 05/10/25 2206    Lab Status: Final result Specimen: Blood from Arm, Right Updated: 05/10/25 2230     PTT 28 seconds             CT chest without contrast   Final Interpretation by Ed Ignacio DO (05/11 0127)      No CT evidence of acute findings.               Workstation performed: [id number]         XR chest 2 views   Final Interpretation by Kathy Cadet MD (05/11 0829)      No acute cardiopulmonary disease.      Right pneumonectomy with hyperinflated left lung.            Workstation performed: [id number]             ECG 12 Lead Documentation Only    Date/Time: 5/10/2025 9:15 PM    Performed by: Yanique Funk MD  Authorized by: Yanique Funk MD    Indications / Diagnosis:  SOB, L sided pain  ECG reviewed by me, the ED Provider: yes    Patient location:  ED  Previous ECG:     Previous ECG:  Compared to current    Comparison ECG info:  01/19/25    Similarity:  No change  Interpretation:     Interpretation: non-specific    Rate:     ECG rate:  89    ECG rate assessment: normal    Rhythm:     Rhythm: sinus rhythm    Ectopy:     Ectopy: none    QRS:     QRS axis:  Normal    QRS intervals:  Normal  Conduction:     Conduction: normal    ST segments:     ST segments:  Normal  T waves:     T waves: non-specific and flattening      Flattening:  AVL and V2      ED Medication and Procedure Management   Prior to Admission  Medications   Prescriptions Last Dose Informant Patient Reported? Taking?   ALPRAZolam (XANAX) 1 mg tablet  Self No No   Sig: Take 1 tablet (1 mg total) by mouth 2 (two) times a day as needed for anxiety or sleep   Ascorbic Acid (vitamin C) 100 MG tablet  Self Yes No   Sig: Take 100 mg by mouth in the morning.   BD Pen Needle Abi U/F 32G X 4 MM MISC  Self No No   Si times daily   Cholecalciferol (Vitamin D3) 125 MCG (5000 UT) TABS  Self Yes No   Sig: Take 5,000 Units by mouth in the morning.   Continuous Blood Gluc  (Dexcom G7 ) SUKHI  Self No No   Sig: Use 1 each continuous   Continuous Glucose Sensor (Dexcom G7 Sensor)  Self No No   Sig: Use 1 Device every 10 days   Hydrocod Edgar-Chlorphe Edgar ER (TUSSIONEX) 10-8 mg/5 mL ER suspension  Self No No   Sig: Take 5 mL by mouth every 12 (twelve) hours as needed for cough Max Daily Amount: 10 mL   Patient not taking: Reported on 2025   Insulin Disposable Pump (Omnipod 5 G6 Intro, Gen 5,) KIT  Self No No   Sig: Use 1 Units continuous   Patient not taking: Reported on 2025   Insulin Disposable Pump (Omnipod 5 G6 Pods, Gen 5,) MISC  Self No No   Sig: Use 1 Units every other day   Patient not taking: Reported on 2025   Insulin Pen Needle (Comfort EZ Pen Needles) 33G X 4 MM MISC  Self No No   Sig: Use to inject insulin 4 times a day   Respiratory Therapy Supplies (Nebulizer) SUKHI  Self Yes No   Sodium Chloride 7 % NEBU  Self Yes No   Turmeric 500 MG CAPS  Self Yes No   Sig: Take by mouth   acetaminophen (TYLENOL) 325 mg tablet  Self No No   Sig: Take 3 tablets (975 mg total) by mouth every 8 (eight) hours   albuterol (2.5 mg/3 mL) 0.083 % nebulizer solution  Self No No   Sig: Take 6 mL (5 mg total) by nebulization 2 (two) times a day   albuterol (PROVENTIL HFA,VENTOLIN HFA) 90 mcg/act inhaler  Self Yes No   apixaban (ELIQUIS) 5 mg  Self No No   Sig: Take 1 tablet (5 mg total) by mouth 2 (two) times a day   atovaquone (MEPRON) 750 mg/5 mL  suspension  Self Yes No   Sig: Take 1,500 mg by mouth in the morning.   azelastine (OPTIVAR) 0.05 % ophthalmic solution  Self No No   Sig: INSTILL 1 DROP INTO BOTH EYES TWICE A DAY   Patient not taking: No sig reported   azithromycin (ZITHROMAX) 250 mg tablet  Self Yes No   Sig: Take 250 mg by mouth 3 (three) times a week   bisacodyl (DULCOLAX) 5 mg EC tablet  Self No No   Sig: Take 1 tablet (5 mg total) by mouth daily at bedtime   budesonide-formoterol (SYMBICORT) 160-4.5 mcg/act inhaler  Self Yes No   Sig: Inhale 2 puffs every 12 (twelve) hours   cyclobenzaprine (FLEXERIL) 5 mg tablet   No No   Sig: Take 1 tablet (5 mg total) by mouth 3 (three) times a day as needed for muscle spasms for up to 14 days   dexlansoprazole (DEXILANT) 60 MG capsule  Self No No   Sig: Take 1 capsule (60 mg total) by mouth daily   dronabinol (MARINOL) 5 MG capsule  Self No No   Sig: Take 1 capsule (5 mg total) by mouth 3 (three) times a day   escitalopram (LEXAPRO) 5 mg tablet  Self No No   Sig: Take 1 tablet (5 mg total) by mouth every other day   famotidine (PEPCID) 40 MG tablet  Self No No   Sig: TAKE 1 TABLET BY MOUTH EVERYDAY AT BEDTIME   ferrous sulfate 325 (65 Fe) mg tablet  Self Yes No   fluconazole (DIFLUCAN) 150 mg tablet  Self Yes No   Sig: Take 1 tablet by mouth in the morning   Patient not taking: Reported on 5/6/2025   furosemide (LASIX) 20 mg tablet  Self Yes No   Sig: Take 40 mg by mouth in the morning.   insulin aspart (NovoLOG FlexPen) 100 UNIT/ML injection pen  Self No No   Sig: INJECT 10 UNITS pre-BREAKFAST, 8 UNITS pre-LUNCH and 12 UNITS pre-DINNER plus scale; max 50 UNITS/day   insulin degludec (Tresiba FlexTouch) 100 units/mL injection pen  Self No No   Sig: Inject 12 Units under the skin daily   insulin glargine (LANTUS) 100 units/mL subcutaneous injection  Self Yes No   Sig: Inject 10 Units under the skin daily   Patient not taking: Reported on 5/15/2025   linezolid (ZYVOX) 600 mg tablet  Self Yes No   Sig: Take  600 mg by mouth 2 (two) times a day   Patient not taking: Reported on 5/6/2025   magnesium gluconate (MAGONATE) 500 mg tablet  Self Yes No   Sig: Take 500 mg by mouth in the morning and 500 mg in the evening.   naloxone (NARCAN) 4 mg/0.1 mL nasal spray  Self No No   Sig: Administer 1 spray into a nostril. If no response after 2-3 minutes, give another dose in the other nostril using a new spray.   ondansetron (ZOFRAN) 4 mg tablet  Self Yes No   Sig: Take 4 mg by mouth every 8 (eight) hours as needed for nausea   oxyCODONE (ROXICODONE) 10 MG TABS  Self No No   Sig: Take 1 tablet (10 mg total) by mouth every 4 (four) hours as needed for moderate pain Max Daily Amount: 60 mg   oxybutynin (DITROPAN XL) 15 MG 24 hr tablet  Self No No   Sig: Take 1 tablet (15 mg total) by mouth daily at bedtime   pancrelipase, Lip-Prot-Amyl, (CREON) 24,000 units  Self Yes No   Sig: Take 48,000 Units by mouth in the morning and 48,000 Units at noon and 48,000 Units in the evening. Take with meals.   pantoprazole (PROTONIX) 40 mg tablet   No No   Sig: Take 1 tablet (40 mg total) by mouth 2 (two) times a day before meals   polyethylene glycol (MIRALAX) 17 g packet  Self No No   Sig: Take 17 g by mouth 2 (two) times a day   predniSONE 10 mg tablet  Self Yes No   Sig: Take 1.5 tablets by mouth in the morning   pregabalin (LYRICA) 150 mg capsule  Self No No   Sig: Take 1 capsule (150 mg total) by mouth daily   Patient not taking: Reported on 5/15/2025   tacrolimus (PROGRAF) 1 mg capsule  Self No No   Sig: Take 3 capsules (3 mg total) by mouth 2 (two) times a day   tretinoin (RETIN-A) 0.025 % cream  Self No No   Sig: Apply pea sized amount to entire face nightly and follow with moisturizer   valACYclovir (VALTREX) 500 mg tablet  Self Yes No   Sig: Take 500 mg by mouth in the morning.   vitamin B-12 (VITAMIN B-12) 1,000 mcg tablet  Self Yes No   Sig: Take by mouth in the morning.      Facility-Administered Medications: None     Discharge  Medication List as of 5/11/2025  3:02 AM        CONTINUE these medications which have NOT CHANGED    Details   acetaminophen (TYLENOL) 325 mg tablet Take 3 tablets (975 mg total) by mouth every 8 (eight) hours, Starting Sun 9/26/2021, No Print      albuterol (2.5 mg/3 mL) 0.083 % nebulizer solution Take 6 mL (5 mg total) by nebulization 2 (two) times a day, Starting Sun 9/26/2021, No Print      albuterol (PROVENTIL HFA,VENTOLIN HFA) 90 mcg/act inhaler INHALE 2 PUFFS EVERY 8 HOURS AS NEEDED FOR SHORTNESS OF BREATH OR WHEEZING., Historical Med      ALPRAZolam (XANAX) 1 mg tablet Take 1 tablet (1 mg total) by mouth 2 (two) times a day as needed for anxiety or sleep, Starting Mon 5/5/2025, Until Wed 6/4/2025 at 2359, Normal      apixaban (ELIQUIS) 5 mg Take 1 tablet (5 mg total) by mouth 2 (two) times a day, Starting Mon 6/5/2023, Until Mon 4/14/2025, No Print      Ascorbic Acid (vitamin C) 100 MG tablet Take 100 mg by mouth daily, Historical Med      atovaquone (MEPRON) 750 mg/5 mL suspension Take 1,500 mg by mouth daily, Historical Med      azelastine (OPTIVAR) 0.05 % ophthalmic solution INSTILL 1 DROP INTO BOTH EYES TWICE A DAY, Normal      azithromycin (ZITHROMAX) 250 mg tablet Take 250 mg by mouth 3 (three) times a week, Starting Fri 1/6/2023, Historical Med      !! BD Pen Needle Abi U/F 32G X 4 MM MISC 4 times daily, Normal      bisacodyl (DULCOLAX) 5 mg EC tablet Take 1 tablet (5 mg total) by mouth daily at bedtime, Starting Sun 5/7/2023, Normal      budesonide-formoterol (SYMBICORT) 160-4.5 mcg/act inhaler Inhale 2 puffs every 12 (twelve) hours, Starting u 9/12/2024, Historical Med      Cholecalciferol (Vitamin D3) 125 MCG (5000 UT) TABS Take 5,000 Units by mouth daily, Historical Med      Continuous Blood Gluc  (Dexcom G7 ) SUKHI Use 1 each continuous, Starting Mon 7/31/2023, Normal      Continuous Glucose Sensor (Dexcom G7 Sensor) Use 1 Device every 10 days, Starting Tue 12/24/2024, Normal       cyclobenzaprine (FLEXERIL) 5 mg tablet Take 1 tablet (5 mg total) by mouth 3 (three) times a day as needed for muscle spasms for up to 14 days, Starting Mon 11/18/2024, Until Mon 12/2/2024 at 2359, Normal      dexlansoprazole (DEXILANT) 60 MG capsule Take 1 capsule (60 mg total) by mouth daily, Starting Fri 3/14/2025, Normal      dronabinol (MARINOL) 5 MG capsule Take 1 capsule (5 mg total) by mouth 3 (three) times a day, Starting Mon 9/11/2023, Normal      escitalopram (LEXAPRO) 5 mg tablet Take 1 tablet (5 mg total) by mouth every other day, Starting Mon 4/14/2025, No Print      famotidine (PEPCID) 40 MG tablet TAKE 1 TABLET BY MOUTH EVERYDAY AT BEDTIME, Starting Thu 10/24/2024, Normal      ferrous sulfate 325 (65 Fe) mg tablet TAKE 1 TABLET BY MOUTH DAILY WITH LUNCH., Historical Med      fluconazole (DIFLUCAN) 150 mg tablet Take 1 tablet by mouth in the morning, Starting Wed 4/2/2025, Historical Med      furosemide (LASIX) 20 mg tablet Take 40 mg by mouth daily, Historical Med      Hydrocod Edgar-Chlorphe Edgar ER (TUSSIONEX) 10-8 mg/5 mL ER suspension Take 5 mL by mouth every 12 (twelve) hours as needed for cough Max Daily Amount: 10 mL, Starting Mon 4/14/2025, Normal      insulin aspart (NovoLOG FlexPen) 100 UNIT/ML injection pen INJECT 10 UNITS pre-BREAKFAST, 8 UNITS pre-LUNCH and 12 UNITS pre-DINNER plus scale; max 50 UNITS/day, Normal      insulin degludec (Tresiba FlexTouch) 100 units/mL injection pen Inject 12 Units under the skin daily, Starting Wed 12/25/2024, Normal      Insulin Disposable Pump (Omnipod 5 G6 Intro, Gen 5,) KIT Use 1 Units continuous, Starting Wed 3/6/2024, Normal      Insulin Disposable Pump (Omnipod 5 G6 Pods, Gen 5,) MISC Use 1 Units every other day, Starting Wed 3/6/2024, Normal      insulin glargine (LANTUS) 100 units/mL subcutaneous injection Inject 10 Units under the skin daily, Starting Mon 2/10/2025, Historical Med      !! Insulin Pen Needle (Comfort EZ Pen Needles) 33G X 4 MM  MISC Use to inject insulin 4 times a day, Normal      linezolid (ZYVOX) 600 mg tablet Take 600 mg by mouth 2 (two) times a day, Starting Sun 2/9/2025, Historical Med      magnesium gluconate (MAGONATE) 500 mg tablet Take 500 mg by mouth 2 (two) times a day, Historical Med      naloxone (NARCAN) 4 mg/0.1 mL nasal spray Administer 1 spray into a nostril. If no response after 2-3 minutes, give another dose in the other nostril using a new spray., Normal      ondansetron (ZOFRAN) 4 mg tablet Take 4 mg by mouth every 8 (eight) hours as needed for nausea, Starting Thu 3/21/2024, Historical Med      oxybutynin (DITROPAN XL) 15 MG 24 hr tablet Take 1 tablet (15 mg total) by mouth daily at bedtime, Starting Tue 9/3/2024, Normal      oxyCODONE (ROXICODONE) 10 MG TABS Take 1 tablet (10 mg total) by mouth every 4 (four) hours as needed for moderate pain Max Daily Amount: 60 mg, Starting Fri 4/18/2025, Normal      pancrelipase, Lip-Prot-Amyl, (CREON) 24,000 units Take 48,000 Units by mouth 3 (three) times a day with meals, Historical Med      pantoprazole (PROTONIX) 40 mg tablet Take 1 tablet (40 mg total) by mouth 2 (two) times a day before meals, Starting Mon 11/18/2024, Until Wed 12/18/2024, Normal      polyethylene glycol (MIRALAX) 17 g packet Take 17 g by mouth 2 (two) times a day, Starting Sun 5/7/2023, No Print      predniSONE 10 mg tablet Take 1.5 tablets by mouth in the morning, Starting Fri 3/14/2025, Historical Med      pregabalin (LYRICA) 150 mg capsule Take 1 capsule (150 mg total) by mouth daily, Starting Mon 4/14/2025, Until Sat 10/11/2025, Normal      Respiratory Therapy Supplies (Nebulizer) SUKHI Historical Med      Sodium Chloride 7 % NEBU TAKE 4 ML USING NEBULIZER EVERY 12 HOURS AS NEEDED (FOR AIRWAY CLEARANCE)., Historical Med      tacrolimus (PROGRAF) 1 mg capsule Take 3 capsules (3 mg total) by mouth 2 (two) times a day, Starting Tue 1/28/2025, No Print      tretinoin (RETIN-A) 0.025 % cream Apply pea sized  amount to entire face nightly and follow with moisturizer, Normal      Turmeric 500 MG CAPS Take by mouth, Historical Med      valACYclovir (VALTREX) 500 mg tablet Take 500 mg by mouth daily, Starting Tue 6/1/2021, Historical Med      vitamin B-12 (VITAMIN B-12) 1,000 mcg tablet Take by mouth daily, Historical Med       !! - Potential duplicate medications found. Please discuss with provider.        No discharge procedures on file.  ED SEPSIS DOCUMENTATION   Time reflects when diagnosis was documented in both MDM as applicable and the Disposition within this note       Time User Action Codes Description Comment    5/11/2025  2:56 AM Yanique Funk [M54.9] Upper back pain on left side     5/11/2025  2:56 AM Yanique Funk Add [R06.02] Short of breath on exertion     5/11/2025  2:57 AM Yanique Funk Remove [R06.02] Short of breath on exertion     5/11/2025  2:57 AM Yanique Funk Add [R06.02] Shortness of breath     5/11/2025  2:57 AM Yanique Funk Add [Z94.2] Lung replaced by transplant (HCC)                  Yanique Funk MD  05/15/25 [id number]

## 2025-05-11 NOTE — DISCHARGE INSTRUCTIONS
Continue to take your medications as prescribed.  Try topical medications to the area, ice or heat as it helps.  Stretching exercises and light activities may help with any component of chest wall pain. Follow up with your primary care doctor and transplant team for further evaluation and management of your symptoms.  Return for fevers, worsening trouble breathing, or for any other concerns.

## 2025-05-15 ENCOUNTER — TELEPHONE (OUTPATIENT)
Dept: SURGERY | Facility: CLINIC | Age: 50
End: 2025-05-15

## 2025-05-15 ENCOUNTER — TELEPHONE (OUTPATIENT)
Dept: OTHER | Facility: OTHER | Age: 50
End: 2025-05-15

## 2025-05-15 ENCOUNTER — APPOINTMENT (EMERGENCY)
Dept: RADIOLOGY | Facility: HOSPITAL | Age: 50
End: 2025-05-15
Payer: COMMERCIAL

## 2025-05-15 ENCOUNTER — APPOINTMENT (EMERGENCY)
Dept: CT IMAGING | Facility: HOSPITAL | Age: 50
End: 2025-05-15
Payer: COMMERCIAL

## 2025-05-15 ENCOUNTER — HOSPITAL ENCOUNTER (INPATIENT)
Facility: HOSPITAL | Age: 50
LOS: 3 days | Discharge: ANOTHER HOSPITAL | End: 2025-05-18
Attending: EMERGENCY MEDICINE
Payer: COMMERCIAL

## 2025-05-15 DIAGNOSIS — R06.00 DYSPNEA: Primary | ICD-10-CM

## 2025-05-15 DIAGNOSIS — Z94.2 LUNG TRANSPLANT RECIPIENT (HCC): ICD-10-CM

## 2025-05-15 PROBLEM — J18.0 BRONCHOPNEUMONIA: Status: ACTIVE | Noted: 2025-05-15

## 2025-05-15 LAB
ALBUMIN SERPL BCG-MCNC: 3.8 G/DL (ref 3.5–5)
ALP SERPL-CCNC: 51 U/L (ref 34–104)
ALT SERPL W P-5'-P-CCNC: 21 U/L (ref 7–52)
ANION GAP SERPL CALCULATED.3IONS-SCNC: 9 MMOL/L (ref 4–13)
APTT PPP: 26 SECONDS (ref 23–34)
AST SERPL W P-5'-P-CCNC: 14 U/L (ref 13–39)
BASE EX.OXY STD BLDV CALC-SCNC: 73.6 % (ref 60–80)
BASE EXCESS BLDV CALC-SCNC: -6.8 MMOL/L
BASOPHILS # BLD AUTO: 0.01 THOUSANDS/ÂΜL (ref 0–0.1)
BASOPHILS NFR BLD AUTO: 0 % (ref 0–1)
BILIRUB DIRECT SERPL-MCNC: 0.06 MG/DL (ref 0–0.2)
BILIRUB SERPL-MCNC: 0.3 MG/DL (ref 0.2–1)
BNP SERPL-MCNC: 779 PG/ML (ref 0–100)
BUN SERPL-MCNC: 29 MG/DL (ref 5–25)
CALCIUM SERPL-MCNC: 8.8 MG/DL (ref 8.4–10.2)
CARDIAC TROPONIN I PNL SERPL HS: 23 NG/L (ref ?–50)
CHLORIDE SERPL-SCNC: 104 MMOL/L (ref 96–108)
CO2 SERPL-SCNC: 22 MMOL/L (ref 21–32)
CREAT SERPL-MCNC: 1.16 MG/DL (ref 0.6–1.3)
EOSINOPHIL # BLD AUTO: 0.02 THOUSAND/ÂΜL (ref 0–0.61)
EOSINOPHIL NFR BLD AUTO: 0 % (ref 0–6)
ERYTHROCYTE [DISTWIDTH] IN BLOOD BY AUTOMATED COUNT: 16.1 % (ref 11.6–15.1)
EST. AVERAGE GLUCOSE BLD GHB EST-MCNC: 217 MG/DL
FLUAV AG UPPER RESP QL IA.RAPID: NEGATIVE
FLUBV AG UPPER RESP QL IA.RAPID: NEGATIVE
GFR SERPL CREATININE-BSD FRML MDRD: 56 ML/MIN/1.73SQ M
GLUCOSE SERPL-MCNC: 173 MG/DL (ref 65–140)
GLUCOSE SERPL-MCNC: 312 MG/DL (ref 65–140)
GLUCOSE SERPL-MCNC: 330 MG/DL (ref 65–140)
GLUCOSE SERPL-MCNC: 349 MG/DL (ref 65–140)
HBA1C MFR BLD: 9.2 %
HCO3 BLDV-SCNC: 18.4 MMOL/L (ref 24–30)
HCT VFR BLD AUTO: 39.3 % (ref 34.8–46.1)
HGB BLD-MCNC: 11.9 G/DL (ref 11.5–15.4)
IMM GRANULOCYTES # BLD AUTO: 0.03 THOUSAND/UL (ref 0–0.2)
IMM GRANULOCYTES NFR BLD AUTO: 1 % (ref 0–2)
INR PPP: 1.03 (ref 0.85–1.19)
LACTATE SERPL-SCNC: 2.3 MMOL/L (ref 0.5–2)
LACTATE SERPL-SCNC: 3.3 MMOL/L (ref 0.5–2)
LYMPHOCYTES # BLD AUTO: 1.57 THOUSANDS/ÂΜL (ref 0.6–4.47)
LYMPHOCYTES NFR BLD AUTO: 26 % (ref 14–44)
MCH RBC QN AUTO: 28.7 PG (ref 26.8–34.3)
MCHC RBC AUTO-ENTMCNC: 30.3 G/DL (ref 31.4–37.4)
MCV RBC AUTO: 95 FL (ref 82–98)
MONOCYTES # BLD AUTO: 0.57 THOUSAND/ÂΜL (ref 0.17–1.22)
MONOCYTES NFR BLD AUTO: 9 % (ref 4–12)
NEUTROPHILS # BLD AUTO: 3.93 THOUSANDS/ÂΜL (ref 1.85–7.62)
NEUTS SEG NFR BLD AUTO: 64 % (ref 43–75)
NRBC BLD AUTO-RTO: 0 /100 WBCS
O2 CT BLDV-SCNC: 13.4 ML/DL
PCO2 BLDV: 36 MM HG (ref 42–50)
PH BLDV: 7.33 [PH] (ref 7.3–7.4)
PLATELET # BLD AUTO: 143 THOUSANDS/UL (ref 149–390)
PMV BLD AUTO: 9.7 FL (ref 8.9–12.7)
PO2 BLDV: 39 MM HG (ref 35–45)
POTASSIUM SERPL-SCNC: 4.1 MMOL/L (ref 3.5–5.3)
PROCALCITONIN SERPL-MCNC: <0.05 NG/ML
PROT SERPL-MCNC: 6.6 G/DL (ref 6.4–8.4)
PROTHROMBIN TIME: 14.2 SECONDS (ref 12.3–15)
RBC # BLD AUTO: 4.15 MILLION/UL (ref 3.81–5.12)
SARS-COV+SARS-COV-2 AG RESP QL IA.RAPID: NEGATIVE
SODIUM SERPL-SCNC: 135 MMOL/L (ref 135–147)
TACROLIMUS BLD-MCNC: 13.3 NG/ML (ref 3–15)
WBC # BLD AUTO: 6.13 THOUSAND/UL (ref 4.31–10.16)

## 2025-05-15 PROCEDURE — 83605 ASSAY OF LACTIC ACID: CPT | Performed by: EMERGENCY MEDICINE

## 2025-05-15 PROCEDURE — 84484 ASSAY OF TROPONIN QUANT: CPT | Performed by: EMERGENCY MEDICINE

## 2025-05-15 PROCEDURE — 93005 ELECTROCARDIOGRAM TRACING: CPT

## 2025-05-15 PROCEDURE — 87040 BLOOD CULTURE FOR BACTERIA: CPT | Performed by: EMERGENCY MEDICINE

## 2025-05-15 PROCEDURE — 87804 INFLUENZA ASSAY W/OPTIC: CPT | Performed by: EMERGENCY MEDICINE

## 2025-05-15 PROCEDURE — 71275 CT ANGIOGRAPHY CHEST: CPT

## 2025-05-15 PROCEDURE — 87811 SARS-COV-2 COVID19 W/OPTIC: CPT | Performed by: EMERGENCY MEDICINE

## 2025-05-15 PROCEDURE — 36415 COLL VENOUS BLD VENIPUNCTURE: CPT | Performed by: EMERGENCY MEDICINE

## 2025-05-15 PROCEDURE — 80048 BASIC METABOLIC PNL TOTAL CA: CPT | Performed by: EMERGENCY MEDICINE

## 2025-05-15 PROCEDURE — 84145 PROCALCITONIN (PCT): CPT

## 2025-05-15 PROCEDURE — 96375 TX/PRO/DX INJ NEW DRUG ADDON: CPT

## 2025-05-15 PROCEDURE — 99285 EMERGENCY DEPT VISIT HI MDM: CPT

## 2025-05-15 PROCEDURE — 96374 THER/PROPH/DIAG INJ IV PUSH: CPT

## 2025-05-15 PROCEDURE — 94640 AIRWAY INHALATION TREATMENT: CPT

## 2025-05-15 PROCEDURE — 99285 EMERGENCY DEPT VISIT HI MDM: CPT | Performed by: EMERGENCY MEDICINE

## 2025-05-15 PROCEDURE — 0202U NFCT DS 22 TRGT SARS-COV-2: CPT | Performed by: EMERGENCY MEDICINE

## 2025-05-15 PROCEDURE — 71045 X-RAY EXAM CHEST 1 VIEW: CPT

## 2025-05-15 PROCEDURE — 83036 HEMOGLOBIN GLYCOSYLATED A1C: CPT

## 2025-05-15 PROCEDURE — 80076 HEPATIC FUNCTION PANEL: CPT | Performed by: EMERGENCY MEDICINE

## 2025-05-15 PROCEDURE — 99223 1ST HOSP IP/OBS HIGH 75: CPT

## 2025-05-15 PROCEDURE — 85610 PROTHROMBIN TIME: CPT | Performed by: EMERGENCY MEDICINE

## 2025-05-15 PROCEDURE — 82805 BLOOD GASES W/O2 SATURATION: CPT | Performed by: EMERGENCY MEDICINE

## 2025-05-15 PROCEDURE — 80197 ASSAY OF TACROLIMUS: CPT | Performed by: EMERGENCY MEDICINE

## 2025-05-15 PROCEDURE — 82948 REAGENT STRIP/BLOOD GLUCOSE: CPT

## 2025-05-15 PROCEDURE — 83880 ASSAY OF NATRIURETIC PEPTIDE: CPT | Performed by: EMERGENCY MEDICINE

## 2025-05-15 PROCEDURE — 99223 1ST HOSP IP/OBS HIGH 75: CPT | Performed by: INTERNAL MEDICINE

## 2025-05-15 PROCEDURE — 85730 THROMBOPLASTIN TIME PARTIAL: CPT | Performed by: EMERGENCY MEDICINE

## 2025-05-15 PROCEDURE — 85025 COMPLETE CBC W/AUTO DIFF WBC: CPT | Performed by: EMERGENCY MEDICINE

## 2025-05-15 RX ORDER — ACETAMINOPHEN 325 MG/1
975 TABLET ORAL EVERY 8 HOURS SCHEDULED
Status: DISCONTINUED | OUTPATIENT
Start: 2025-05-15 | End: 2025-05-18 | Stop reason: HOSPADM

## 2025-05-15 RX ORDER — HEPARIN SODIUM 5000 [USP'U]/ML
5000 INJECTION, SOLUTION INTRAVENOUS; SUBCUTANEOUS EVERY 8 HOURS SCHEDULED
Status: DISCONTINUED | OUTPATIENT
Start: 2025-05-15 | End: 2025-05-15

## 2025-05-15 RX ORDER — PREDNISONE 10 MG/1
15 TABLET ORAL DAILY
Status: DISCONTINUED | OUTPATIENT
Start: 2025-05-15 | End: 2025-05-15

## 2025-05-15 RX ORDER — INSULIN LISPRO 100 [IU]/ML
1-5 INJECTION, SOLUTION INTRAVENOUS; SUBCUTANEOUS
Status: DISCONTINUED | OUTPATIENT
Start: 2025-05-15 | End: 2025-05-15

## 2025-05-15 RX ORDER — INSULIN LISPRO 100 [IU]/ML
8 INJECTION, SOLUTION INTRAVENOUS; SUBCUTANEOUS
Status: DISCONTINUED | OUTPATIENT
Start: 2025-05-15 | End: 2025-05-18 | Stop reason: HOSPADM

## 2025-05-15 RX ORDER — POLYETHYLENE GLYCOL 3350 17 G/17G
17 POWDER, FOR SOLUTION ORAL 2 TIMES DAILY
Status: DISCONTINUED | OUTPATIENT
Start: 2025-05-15 | End: 2025-05-18 | Stop reason: HOSPADM

## 2025-05-15 RX ORDER — FLUCONAZOLE 150 MG/1
150 TABLET ORAL DAILY
Status: DISCONTINUED | OUTPATIENT
Start: 2025-05-15 | End: 2025-05-15

## 2025-05-15 RX ORDER — HYDROMORPHONE HCL/PF 1 MG/ML
0.5 SYRINGE (ML) INJECTION EVERY 2 HOUR PRN
Refills: 0 | Status: DISCONTINUED | OUTPATIENT
Start: 2025-05-15 | End: 2025-05-17

## 2025-05-15 RX ORDER — PREDNISONE 20 MG/1
40 TABLET ORAL DAILY
Status: DISCONTINUED | OUTPATIENT
Start: 2025-05-15 | End: 2025-05-18 | Stop reason: HOSPADM

## 2025-05-15 RX ORDER — HYDROMORPHONE HCL/PF 1 MG/ML
1 SYRINGE (ML) INJECTION ONCE
Refills: 0 | Status: COMPLETED | OUTPATIENT
Start: 2025-05-15 | End: 2025-05-15

## 2025-05-15 RX ORDER — ALBUTEROL SULFATE 90 UG/1
2 INHALANT RESPIRATORY (INHALATION) 4 TIMES DAILY
Status: DISCONTINUED | OUTPATIENT
Start: 2025-05-15 | End: 2025-05-15

## 2025-05-15 RX ORDER — DRONABINOL 5 MG/1
5 CAPSULE ORAL 3 TIMES DAILY
Status: DISCONTINUED | OUTPATIENT
Start: 2025-05-15 | End: 2025-05-15

## 2025-05-15 RX ORDER — BUDESONIDE AND FORMOTEROL FUMARATE DIHYDRATE 160; 4.5 UG/1; UG/1
2 AEROSOL RESPIRATORY (INHALATION) EVERY 12 HOURS
Status: DISCONTINUED | OUTPATIENT
Start: 2025-05-15 | End: 2025-05-18 | Stop reason: HOSPADM

## 2025-05-15 RX ORDER — HYDROCODONE POLISTIREX AND CHLORPHENIRAMINE POLISTIREX 10; 8 MG/5ML; MG/5ML
5 SUSPENSION, EXTENDED RELEASE ORAL EVERY 12 HOURS PRN
Status: DISCONTINUED | OUTPATIENT
Start: 2025-05-15 | End: 2025-05-15

## 2025-05-15 RX ORDER — SODIUM CHLORIDE FOR INHALATION 3 %
4 VIAL, NEBULIZER (ML) INHALATION
Status: DISPENSED | OUTPATIENT
Start: 2025-05-15 | End: 2025-05-17

## 2025-05-15 RX ORDER — RIBOFLAVIN (VITAMIN B2) 100 MG
100 TABLET ORAL DAILY
Status: DISCONTINUED | OUTPATIENT
Start: 2025-05-15 | End: 2025-05-15

## 2025-05-15 RX ORDER — ESCITALOPRAM OXALATE 10 MG/1
5 TABLET ORAL EVERY OTHER DAY
Status: DISCONTINUED | OUTPATIENT
Start: 2025-05-16 | End: 2025-05-18 | Stop reason: HOSPADM

## 2025-05-15 RX ORDER — ALBUTEROL SULFATE 90 UG/1
2 INHALANT RESPIRATORY (INHALATION) EVERY 4 HOURS PRN
Status: DISCONTINUED | OUTPATIENT
Start: 2025-05-15 | End: 2025-05-18 | Stop reason: HOSPADM

## 2025-05-15 RX ORDER — AZITHROMYCIN 250 MG/1
250 TABLET, FILM COATED ORAL 3 TIMES WEEKLY
Status: DISCONTINUED | OUTPATIENT
Start: 2025-05-16 | End: 2025-05-18 | Stop reason: HOSPADM

## 2025-05-15 RX ORDER — TRETINOIN 0.25 MG/G
1 CREAM TOPICAL
Status: DISCONTINUED | OUTPATIENT
Start: 2025-05-15 | End: 2025-05-18 | Stop reason: HOSPADM

## 2025-05-15 RX ORDER — OXYBUTYNIN CHLORIDE 5 MG/1
15 TABLET, EXTENDED RELEASE ORAL
Status: DISCONTINUED | OUTPATIENT
Start: 2025-05-15 | End: 2025-05-18 | Stop reason: HOSPADM

## 2025-05-15 RX ORDER — LINEZOLID 2 MG/ML
600 INJECTION, SOLUTION INTRAVENOUS ONCE
Status: COMPLETED | OUTPATIENT
Start: 2025-05-15 | End: 2025-05-15

## 2025-05-15 RX ORDER — FUROSEMIDE 40 MG/1
40 TABLET ORAL DAILY
Status: DISCONTINUED | OUTPATIENT
Start: 2025-05-15 | End: 2025-05-15

## 2025-05-15 RX ORDER — PANTOPRAZOLE SODIUM 40 MG/1
40 TABLET, DELAYED RELEASE ORAL EVERY EVENING
Status: DISCONTINUED | OUTPATIENT
Start: 2025-05-15 | End: 2025-05-16

## 2025-05-15 RX ORDER — ALBUTEROL SULFATE 0.83 MG/ML
5 SOLUTION RESPIRATORY (INHALATION)
Status: DISCONTINUED | OUTPATIENT
Start: 2025-05-15 | End: 2025-05-18 | Stop reason: HOSPADM

## 2025-05-15 RX ORDER — VALACYCLOVIR HYDROCHLORIDE 500 MG/1
500 TABLET, FILM COATED ORAL DAILY
Status: DISCONTINUED | OUTPATIENT
Start: 2025-05-15 | End: 2025-05-18 | Stop reason: HOSPADM

## 2025-05-15 RX ORDER — BISACODYL 5 MG/1
5 TABLET, DELAYED RELEASE ORAL
Status: DISCONTINUED | OUTPATIENT
Start: 2025-05-15 | End: 2025-05-18 | Stop reason: HOSPADM

## 2025-05-15 RX ORDER — ATOVAQUONE 750 MG/5ML
1500 SUSPENSION ORAL DAILY
Status: DISCONTINUED | OUTPATIENT
Start: 2025-05-15 | End: 2025-05-18 | Stop reason: HOSPADM

## 2025-05-15 RX ORDER — INSULIN LISPRO 100 [IU]/ML
1-5 INJECTION, SOLUTION INTRAVENOUS; SUBCUTANEOUS
Status: DISCONTINUED | OUTPATIENT
Start: 2025-05-15 | End: 2025-05-18 | Stop reason: HOSPADM

## 2025-05-15 RX ORDER — INSULIN GLARGINE 100 [IU]/ML
10 INJECTION, SOLUTION SUBCUTANEOUS EVERY MORNING
Status: DISCONTINUED | OUTPATIENT
Start: 2025-05-15 | End: 2025-05-18 | Stop reason: HOSPADM

## 2025-05-15 RX ORDER — TACROLIMUS 0.5 MG/1
3 CAPSULE ORAL 2 TIMES DAILY
Status: DISCONTINUED | OUTPATIENT
Start: 2025-05-15 | End: 2025-05-18 | Stop reason: HOSPADM

## 2025-05-15 RX ORDER — FAMOTIDINE 20 MG/1
40 TABLET, FILM COATED ORAL
Status: DISCONTINUED | OUTPATIENT
Start: 2025-05-15 | End: 2025-05-18 | Stop reason: HOSPADM

## 2025-05-15 RX ORDER — FERROUS SULFATE 325(65) MG
325 TABLET ORAL EVERY OTHER DAY
Status: DISCONTINUED | OUTPATIENT
Start: 2025-05-16 | End: 2025-05-18 | Stop reason: HOSPADM

## 2025-05-15 RX ORDER — HYDROMORPHONE HCL/PF 1 MG/ML
0.5 SYRINGE (ML) INJECTION EVERY 2 HOUR PRN
Status: DISCONTINUED | OUTPATIENT
Start: 2025-05-15 | End: 2025-05-15

## 2025-05-15 RX ORDER — INSULIN GLARGINE 100 [IU]/ML
10 INJECTION, SOLUTION SUBCUTANEOUS DAILY
Status: DISCONTINUED | OUTPATIENT
Start: 2025-05-15 | End: 2025-05-15

## 2025-05-15 RX ORDER — OXYCODONE HYDROCHLORIDE 10 MG/1
10 TABLET ORAL EVERY 4 HOURS PRN
Status: DISCONTINUED | OUTPATIENT
Start: 2025-05-15 | End: 2025-05-18 | Stop reason: HOSPADM

## 2025-05-15 RX ORDER — ASCORBIC ACID 500 MG
500 TABLET ORAL DAILY
Status: DISCONTINUED | OUTPATIENT
Start: 2025-05-16 | End: 2025-05-18 | Stop reason: HOSPADM

## 2025-05-15 RX ORDER — PREGABALIN 75 MG/1
150 CAPSULE ORAL DAILY
Status: DISCONTINUED | OUTPATIENT
Start: 2025-05-15 | End: 2025-05-15

## 2025-05-15 RX ORDER — ALPRAZOLAM 0.5 MG
1 TABLET ORAL 2 TIMES DAILY PRN
Status: DISCONTINUED | OUTPATIENT
Start: 2025-05-15 | End: 2025-05-18 | Stop reason: HOSPADM

## 2025-05-15 RX ORDER — ONDANSETRON 2 MG/ML
4 INJECTION INTRAMUSCULAR; INTRAVENOUS EVERY 4 HOURS PRN
Status: DISCONTINUED | OUTPATIENT
Start: 2025-05-15 | End: 2025-05-18 | Stop reason: HOSPADM

## 2025-05-15 RX ORDER — LINEZOLID 2 MG/ML
600 INJECTION, SOLUTION INTRAVENOUS EVERY 12 HOURS
Status: DISCONTINUED | OUTPATIENT
Start: 2025-05-15 | End: 2025-05-18 | Stop reason: HOSPADM

## 2025-05-15 RX ADMIN — Medication 5000 UNITS: at 13:37

## 2025-05-15 RX ADMIN — TACROLIMUS 3 MG: 1 CAPSULE ORAL at 18:35

## 2025-05-15 RX ADMIN — CYANOCOBALAMIN TAB 500 MCG 1000 MCG: 500 TAB at 13:37

## 2025-05-15 RX ADMIN — HYDROMORPHONE HYDROCHLORIDE 0.5 MG: 1 INJECTION, SOLUTION INTRAMUSCULAR; INTRAVENOUS; SUBCUTANEOUS at 12:42

## 2025-05-15 RX ADMIN — HYDROMORPHONE HYDROCHLORIDE 0.5 MG: 1 INJECTION, SOLUTION INTRAMUSCULAR; INTRAVENOUS; SUBCUTANEOUS at 23:45

## 2025-05-15 RX ADMIN — Medication 500 MG: at 13:53

## 2025-05-15 RX ADMIN — ACETAMINOPHEN 975 MG: 325 TABLET ORAL at 22:06

## 2025-05-15 RX ADMIN — HYDROMORPHONE HYDROCHLORIDE 0.5 MG: 1 INJECTION, SOLUTION INTRAMUSCULAR; INTRAVENOUS; SUBCUTANEOUS at 20:59

## 2025-05-15 RX ADMIN — ALPRAZOLAM 1 MG: 0.5 TABLET ORAL at 13:52

## 2025-05-15 RX ADMIN — PIPERACILLIN AND TAZOBACTAM 4.5 G: 36; 4.5 INJECTION, POWDER, FOR SOLUTION INTRAVENOUS at 15:42

## 2025-05-15 RX ADMIN — IOHEXOL 85 ML: 350 INJECTION, SOLUTION INTRAVENOUS at 09:33

## 2025-05-15 RX ADMIN — POLYETHYLENE GLYCOL 3350 17 G: 17 POWDER, FOR SOLUTION ORAL at 13:36

## 2025-05-15 RX ADMIN — Medication 500 MG: at 18:27

## 2025-05-15 RX ADMIN — FUROSEMIDE 40 MG: 40 TABLET ORAL at 13:37

## 2025-05-15 RX ADMIN — ONDANSETRON 4 MG: 2 INJECTION INTRAMUSCULAR; INTRAVENOUS at 16:37

## 2025-05-15 RX ADMIN — SODIUM CHLORIDE 1000 ML: 0.9 INJECTION, SOLUTION INTRAVENOUS at 10:53

## 2025-05-15 RX ADMIN — OXYBUTYNIN CHLORIDE 15 MG: 5 TABLET, EXTENDED RELEASE ORAL at 22:20

## 2025-05-15 RX ADMIN — LINEZOLID 600 MG: 600 INJECTION, SOLUTION INTRAVENOUS at 11:58

## 2025-05-15 RX ADMIN — INSULIN GLARGINE 10 UNITS: 100 INJECTION, SOLUTION SUBCUTANEOUS at 15:43

## 2025-05-15 RX ADMIN — ALBUTEROL SULFATE 5 MG: 2.5 SOLUTION RESPIRATORY (INHALATION) at 20:10

## 2025-05-15 RX ADMIN — LINEZOLID 600 MG: 600 INJECTION, SOLUTION INTRAVENOUS at 22:20

## 2025-05-15 RX ADMIN — BISACODYL 5 MG: 5 TABLET, COATED ORAL at 22:06

## 2025-05-15 RX ADMIN — PANCRELIPASE 48000 UNITS: 120000; 24000; 76000 CAPSULE, DELAYED RELEASE PELLETS ORAL at 14:16

## 2025-05-15 RX ADMIN — HYDROMORPHONE HYDROCHLORIDE 0.5 MG: 1 INJECTION, SOLUTION INTRAMUSCULAR; INTRAVENOUS; SUBCUTANEOUS at 17:37

## 2025-05-15 RX ADMIN — VALACYCLOVIR HYDROCHLORIDE 500 MG: 500 TABLET, FILM COATED ORAL at 13:37

## 2025-05-15 RX ADMIN — PIPERACILLIN AND TAZOBACTAM 4.5 G: 36; 4.5 INJECTION, POWDER, FOR SOLUTION INTRAVENOUS at 21:04

## 2025-05-15 RX ADMIN — ALBUTEROL SULFATE 2 PUFF: 90 AEROSOL, METERED RESPIRATORY (INHALATION) at 12:41

## 2025-05-15 RX ADMIN — ONDANSETRON 4 MG: 2 INJECTION INTRAMUSCULAR; INTRAVENOUS at 23:45

## 2025-05-15 RX ADMIN — PANCRELIPASE 48000 UNITS: 120000; 24000; 76000 CAPSULE, DELAYED RELEASE PELLETS ORAL at 18:26

## 2025-05-15 RX ADMIN — INSULIN LISPRO 3 UNITS: 100 INJECTION, SOLUTION INTRAVENOUS; SUBCUTANEOUS at 18:34

## 2025-05-15 RX ADMIN — PREDNISONE 40 MG: 20 TABLET ORAL at 13:37

## 2025-05-15 RX ADMIN — ACETAMINOPHEN 975 MG: 325 TABLET ORAL at 13:37

## 2025-05-15 RX ADMIN — APIXABAN 5 MG: 5 TABLET, FILM COATED ORAL at 13:37

## 2025-05-15 RX ADMIN — HYDROMORPHONE HYDROCHLORIDE 1 MG: 1 INJECTION, SOLUTION INTRAMUSCULAR; INTRAVENOUS; SUBCUTANEOUS at 09:22

## 2025-05-15 RX ADMIN — PANTOPRAZOLE SODIUM 40 MG: 40 TABLET, DELAYED RELEASE ORAL at 18:25

## 2025-05-15 RX ADMIN — INSULIN LISPRO 8 UNITS: 100 INJECTION, SOLUTION INTRAVENOUS; SUBCUTANEOUS at 18:34

## 2025-05-15 RX ADMIN — FAMOTIDINE 40 MG: 20 TABLET, FILM COATED ORAL at 22:06

## 2025-05-15 RX ADMIN — APIXABAN 5 MG: 5 TABLET, FILM COATED ORAL at 18:34

## 2025-05-15 RX ADMIN — PIPERACILLIN AND TAZOBACTAM 4.5 G: 36; 4.5 INJECTION, POWDER, FOR SOLUTION INTRAVENOUS at 10:53

## 2025-05-15 RX ADMIN — TACROLIMUS 3 MG: 1 CAPSULE ORAL at 14:15

## 2025-05-15 NOTE — H&P
"H&P - Hospitalist   Name: Maximilian Mann 49 y.o. female I MRN: [id number]  Unit/Bed#: ED 29 I Date of Admission: 5/15/2025   Date of Service: 5/15/2025 I Hospital Day: 0     Assessment & Plan  Type 1 diabetes mellitus with diabetic chronic kidney disease, unspecified CKD stage (Formerly [facility])  Lab Results   Component Value Date    HGBA1C 7.8 (H) 12/16/2024       No results for input(s): \"POCGLU\" in the last 72 hours.    Blood Sugar Average: Last 72 hrs:    Patient has cystic fibrosis associated with diabetes.  On long-acting insulin 10 units daily and aspart sliding scale 3 times daily with meals (approximately 10 units breakfast 8 units lunch and 12 units dinner).    -Start Lantus 10 units every morning  -Start lispro 8 units 3 times daily with meals plus sliding scale insulin  - Check blood glucose 3 times daily prior to meals  -Monitor for hyperglycemia in the setting of steroid use, currently increased to 40 mg per transplant doctor at [facility] recommendation  S/P pneumonectomy  Status post right-sided pneumonectomy in 2021  Pancreatic insufficiency  Continue on Creon 32070 units 3 times daily with meals  Asthma  No wheezing on exam today  Continue daily Symbicort  Albuterol inhalation twice daily  Sodium chloride 3% inhalations twice daily for asthma and history of CF per respiratory  Chronic pain disorder  Patient takes oxycodone IR 10 mg every 4 hours as needed outpatient  Will continue current regimen orally  Add breakthrough with 0.5 mg IV Dilaudid every 2 hours as needed  DVT (deep venous thrombosis) (Formerly [facility])  History of DVT PE on Eliquis  Continue Eliquis  Anxiety  Ativan 1 mg twice daily as needed  Continue Lexapro  Immunosuppression (Formerly [facility])  Patient with systemic immunosuppression on tacrolimus and prednisone  Continue prophylaxis with valacyclovir 500 daily, azithromycin 250 mg 3 times weekly on Monday Wednesday Friday  Atovaquone 1500 mg daily  Check tacrolimus level  Stage 3a chronic kidney disease (Formerly [facility])  Lab " Results   Component Value Date    EGFR 56 05/15/2025    EGFR 42 05/10/2025    EGFR 43 (L) 01/30/2025    CREATININE 1.16 05/15/2025    CREATININE 1.46 (H) 05/10/2025    CREATININE 1.14 (H) 02/10/2025   Baseline appears to be 1-1.3  Currently at baseline  Monitor BMP daily   Chronic rejection of lung transplant (HCC)  On tacrolimus 3 mg twice daily  Following [facility] transplant  On chronic prednisone 15 mg daily, recently increased to 40 mg daily per transplant DrMumtaz In setting of increased dyspnea  Shortness of breath  Patient with lung transplant with chronic rejection on tacrolimus and prednisone  Patient was instructed to increase prednisone from 15 mg to 40 mg with taper per [facility] transplant doctor  WBC 6.13K, VBG 7.326/36/39/18.4, , Lactate 3.3 improved to 2.3 s/p fluids, flu/covid rsv negative  CTA chest PE this admission shows mild diffuse bronchiolitis without new findings similar to prior imaging.  Status post right pneumonectomy and left lung transplant.  No signs of PE or overt pneumonia  History of MRSA and Pseudomonas pneumonia in January 2025 treated at [facility]  Treat with linezolid 600 mg IV every 12 hours and pip-tazo extended infusion 4.5 g IV every 8 hours per protocol for suspected pneumonia (discussed with Dr. Gianna Crews from pharmacy)  Pulmonology consulted for comanagement  Continue on tacrolimus 3 mg twice daily  Started on prednisone 40 mg daily with plans for taper  Follow-up blood cultures; Check RP 2 panel  Will give 1 dose lasix 40 mg PO (patient takes PRN) in setting of elevated BNP and dyspnea  Transfer ADT placed through PACS for transfer to [facility], Dr. Wilburn accepting physician at [facility] when bed available.  Lactic acidosis  Blood pressure 130s over 80s at bedside at this time  Elevated lactate 3.3 improved to 2.3 status post 1 L bolus of fluids  Not meeting sepsis criteria at this time      VTE Pharmacologic Prophylaxis:   Eliquis  Code Status: Level 1 - Full Code Discussed with  patient   Discussion with family: Attempted to update  () via phone. Left voicemail.     Anticipated Length of Stay: Patient will be admitted on an inpatient basis with an anticipated length of stay of greater than 2 midnights secondary to dyspnea with suspected PNA.    History of Present Illness   Chief Complaint: Dyspnea    Maximilian Mann is a 49 y.o. 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 Patient's last admission was at [facility] on 1/30/25 for MRSA and pseudomonas pneumonia. Patient sent to be admitted for IV linezolid and zosyn with possible transfer to [facility].     Patient reports feeling more short of breath with activity but does not have any fevers or chills.  Has ongoing cough that is slightly more frequent but nonproductive.      Transfer intake with PAC has been completed, with accepting transplant MD Dr. Wilburn from [facility], patient admitted for IV antibiotics while awaiting bed for transfer.     Review of Systems   Constitutional:  Positive for fatigue. Negative for chills and fever.   HENT:  Negative for ear pain and sore throat.    Eyes:  Negative for pain and visual disturbance.   Respiratory:  Positive for cough and shortness of breath. Negative for chest tightness and wheezing.         Chronic pleuritic chest pain on L back   Cardiovascular:  Negative for palpitations and leg swelling.   Gastrointestinal:  Positive for nausea. Negative for abdominal pain and vomiting.   Genitourinary:  Negative for dysuria.   Musculoskeletal:  Negative for arthralgias and myalgias.   Skin:  Negative for rash.        Face with rosacea at baseline    Allergic/Immunologic: Positive for immunocompromised state.   Neurological:  Negative for seizures, syncope and headaches.   All other systems reviewed and are negative.      Historical Information   Past Medical History:   Diagnosis Date    Abnormal uterine bleeding (AUB) 7/7/2022    ABPA (allergic bronchopulmonary aspergillosis) ([facility]) 07/08/2012    Acute kidney injury ([facility]) 09/08/2017    Acute on chronic respiratory failure ([facility]) 03/04/2017    Last Assessment & Plan:  Formatting of this note might be different from the original. Recurrent with clinical worsening with enlarging PTX, s/p multiple chest tubes, but new fevers 6/19 and worsening leukocytosis indicating infection as cause of 6/19 decompensation with profound hypoxia in setting of shunting through right lung and continued pneumothorax s/p 2nd chest tube placement.  6/27: Right    Anaphylaxis 06/06/2022    Anemia     Asthma     Chest pain 09/21/2021    Chronic pain     Chronic respiratory failure with hypoxia ([facility]) 09/07/2021    COVID-19 06/03/2022    Cystic fibrosis ([facility])     Deep vein thrombosis ([facility])     Diabetes mellitus ([facility])     Fever 09/22/2024    GERD (gastroesophageal reflux disease)     HBP (high blood pressure)     HCAP (healthcare-associated pneumonia) 03/06/2020    History of transfusion     Influenza B 02/23/2020    Kidney stone     Moderate protein-calorie malnutrition ([facility]) 01/08/2022    Neutropenic fever  ([facility]) 09/26/2020    Pneumonia due to Pseudomonas species ([facility]) 10/28/2010    Last Assessment & Plan:  Formatting of this note might be different from the original. Polymicrobial pna w/ pseudomonas, M. Abscessus, Cadida glabrata/albicans    PONV (postoperative nausea and vomiting)     Pseudomonas aeruginosa infection 09/20/2012    Renal calculus     Squamous cell skin cancer 01/12/2023    SCCIS- Left upper thigh    Transplant recipient     Lung- 2013, 2017    Ulcerative colitis ([facility])     Wellness examination 02/04/2013    Formatting of  this note might be different from the original. Discussed with patient and acknowledgement form signed on 2013 (ALYSHA)     Past Surgical History:   Procedure Laterality Date    APPENDECTOMY      AUGMENTATION MAMMAPLASTY Bilateral 2004    AUGMENTATION MAMMAPLASTY Right     removed     BREAST SURGERY      Aug.     BRONCHOSCOPY      CHOLECYSTECTOMY      COLONOSCOPY      EGD  2019    Yeast Infect.     FL RETROGRADE PYELOGRAM  2020    FL RETROGRADE PYELOGRAM  2020    FL RETROGRADE PYELOGRAM  2024    GASTROSCOPY  2024    GASTROSTOMY TUBE, PLACE      IR BALLOON-OCCLUDED ANTEGRADE TRANSVENOUS OBLITERATION (BATO)  2022    IR PORT PLACEMENT  2022    IR PORT REMOVAL  01/10/2022    IR PORT STRIPPING  2022    IR TUNNELED CENTRAL LINE PLACEMENT  2022    LUNG SURGERY      LUNG TRANSPLANT      X2    NH CYSTO/URETERO W/LITHOTRIPSY &INDWELL STENT INSRT Right 2020    Procedure: CYSTOSCOPY URETEROSCOPY WITH LITHOTRIPSY HOLMIUM LASER, RETROGRADE PYELOGRAM AND INSERTION STENT URETERAL;  Surgeon: Patricio Perry MD;  Location: [facility];  Service: Urology    NH CYSTO/URETERO W/LITHOTRIPSY &INDWELL STENT INSRT Left 2024    Procedure: CYSTOSCOPY URETEROSCOPY WITH LITHOTRIPSY HOLMIUM LASER, RETROGRADE PYELOGRAM AND INSERTION STENT URETERAL, BASKET STONE EXTRACTION;  Surgeon: Lee Briceno MD;  Location: [facility];  Service: Urology    NH CYSTOURETHROSCOPY W/URETERAL CATHETERIZATION Right 2020    Procedure: CYSTOSCOPY RETROGRADE PYELOGRAM WITH INSERTION STENT URETERAL;  Surgeon: Patricio Perry MD;  Location: [facility];  Service: Urology    SKIN BIOPSY      US GUIDED VASCULAR ACCESS  2017     Social History     Tobacco Use    Smoking status: Former     Current packs/day: 0.00     Average packs/day: 0.5 packs/day for 12.0 years (6.0 ttl pk-yrs)     Types: Cigarettes     Start date: 1993     Quit date: 2005     Years since quittin.3      Passive exposure: Past    Smokeless tobacco: Never   Vaping Use    Vaping status: Never Used   Substance and Sexual Activity    Alcohol use: Yes     Alcohol/week: 2.0 standard drinks of alcohol     Types: 2 Glasses of wine per week     Comment: social    Drug use: Yes     Frequency: 2.0 times per week     Types: Marijuana     Comment: medical edible marijuana prescribed    Sexual activity: Yes     Partners: Male     Birth control/protection: None     E-Cigarette/Vaping    E-Cigarette Use Never User      E-Cigarette/Vaping Substances    Nicotine No     THC No     CBD No     Flavoring No     Other No     Unknown No      Family History   Problem Relation Age of Onset    Alcohol abuse Mother     Mental illness Mother     Depression Mother     Alcohol abuse Father     COPD Maternal Grandmother     Lung cancer Maternal Grandmother 65    Cancer Maternal Grandmother     COPD Maternal Grandfather     Cancer Maternal Grandfather     COPD Paternal Grandmother     Lung cancer Paternal Grandmother 65    No Known Problems Sister     No Known Problems Paternal Aunt     Breast cancer Neg Hx     Ovarian cancer Neg Hx     Colon cancer Neg Hx      Social History:  Marital Status: /Civil Union   Patient Pre-hospital Living Situation: Home  Patient Pre-hospital Level of Mobility: walks  Patient Pre-hospital Diet Restrictions: none    Meds/Allergies   I have reviewed home medications with patient personally.  Prior to Admission medications    Medication Sig Start Date End Date Taking? Authorizing Provider   acetaminophen (TYLENOL) 325 mg tablet Take 3 tablets (975 mg total) by mouth every 8 (eight) hours 9/26/21  Yes WAYNE Paredes   albuterol (2.5 mg/3 mL) 0.083 % nebulizer solution Take 6 mL (5 mg total) by nebulization 2 (two) times a day 9/26/21  Yes WAYNE Paredes   albuterol (PROVENTIL HFA,VENTOLIN HFA) 90 mcg/act inhaler  11/29/23  Yes Historical Provider, MD   ALPRAZolam (XANAX) 1 mg tablet Take 1 tablet (1 mg  total) by mouth 2 (two) times a day as needed for anxiety or sleep 5/5/25 6/4/25 Yes Solis Avila MD   apixaban (ELIQUIS) 5 mg Take 1 tablet (5 mg total) by mouth 2 (two) times a day 6/5/23 5/15/25 Yes Solis Avila MD   Ascorbic Acid (vitamin C) 100 MG tablet Take 100 mg by mouth in the morning.   Yes Historical Provider, MD   atovaquone (MEPRON) 750 mg/5 mL suspension Take 1,500 mg by mouth in the morning.   Yes Historical Provider, MD   azithromycin (ZITHROMAX) 250 mg tablet Take 250 mg by mouth 3 (three) times a week 1/6/23  Yes Historical Provider, MD   bisacodyl (DULCOLAX) 5 mg EC tablet Take 1 tablet (5 mg total) by mouth daily at bedtime 5/7/23  Yes Armand Juarez MD   budesonide-formoterol (SYMBICORT) 160-4.5 mcg/act inhaler Inhale 2 puffs every 12 (twelve) hours 9/12/24  Yes Historical Provider, MD   Cholecalciferol (Vitamin D3) 125 MCG (5000 UT) TABS Take 5,000 Units by mouth in the morning.   Yes Historical Provider, MD   dexlansoprazole (DEXILANT) 60 MG capsule Take 1 capsule (60 mg total) by mouth daily 3/14/25  Yes WAYNE Nunez   dronabinol (MARINOL) 5 MG capsule Take 1 capsule (5 mg total) by mouth 3 (three) times a day 9/11/23  Yes Gwendolyn Nuñez MD   escitalopram (LEXAPRO) 5 mg tablet Take 1 tablet (5 mg total) by mouth every other day 4/14/25  Yes Solis Avila MD   famotidine (PEPCID) 40 MG tablet TAKE 1 TABLET BY MOUTH EVERYDAY AT BEDTIME 10/24/24  Yes Gwendolyn Nuñez MD   ferrous sulfate 325 (65 Fe) mg tablet  3/14/25  Yes Historical Provider, MD   insulin aspart (NovoLOG FlexPen) 100 UNIT/ML injection pen INJECT 10 UNITS pre-BREAKFAST, 8 UNITS pre-LUNCH and 12 UNITS pre-DINNER plus scale; max 50 UNITS/day 12/25/24  Yes Eve Polk MD   insulin degludec (Tresiba FlexTouch) 100 units/mL injection pen Inject 12 Units under the skin daily 12/25/24  Yes Eve Polk MD   Insulin Pen Needle (Comfort EZ Pen Needles) 33G X 4 MM MISC Use to inject insulin 4 times a  day 12/25/24  Yes Eve Polk MD   magnesium gluconate (MAGONATE) 500 mg tablet Take 500 mg by mouth in the morning and 500 mg in the evening.   Yes Historical Provider, MD   ondansetron (ZOFRAN) 4 mg tablet Take 4 mg by mouth every 8 (eight) hours as needed for nausea 3/21/24  Yes Historical Provider, MD   oxybutynin (DITROPAN XL) 15 MG 24 hr tablet Take 1 tablet (15 mg total) by mouth daily at bedtime 9/3/24  Yes Gianna Rob PA-C   oxyCODONE (ROXICODONE) 10 MG TABS Take 1 tablet (10 mg total) by mouth every 4 (four) hours as needed for moderate pain Max Daily Amount: 60 mg 4/18/25  Yes Kathya Hanson MD   pancrelipase, Lip-Prot-Amyl, (CREON) 24,000 units Take 48,000 Units by mouth in the morning and 48,000 Units at noon and 48,000 Units in the evening. Take with meals.   Yes Historical Provider, MD   predniSONE 10 mg tablet Take 1.5 tablets by mouth in the morning 3/14/25  Yes Historical Provider, MD   Respiratory Therapy Supplies (Nebulizer) SUKHI  7/16/21  Yes Historical Provider, MD   Sodium Chloride 7 % NEBU  2/28/25  Yes Historical Provider, MD   tacrolimus (PROGRAF) 1 mg capsule Take 3 capsules (3 mg total) by mouth 2 (two) times a day 1/28/25  Yes WAYNE Ball   tretinoin (RETIN-A) 0.025 % cream Apply pea sized amount to entire face nightly and follow with moisturizer 5/1/25  Yes Bridgette Carrera MD   Turmeric 500 MG CAPS Take by mouth   Yes Historical Provider, MD   valACYclovir (VALTREX) 500 mg tablet Take 500 mg by mouth in the morning. 6/1/21  Yes Historical Provider, MD   vitamin B-12 (VITAMIN B-12) 1,000 mcg tablet Take by mouth in the morning.   Yes Historical Provider, MD   azelastine (OPTIVAR) 0.05 % ophthalmic solution INSTILL 1 DROP INTO BOTH EYES TWICE A DAY  Patient not taking: No sig reported 1/21/25   Kathya Hanson MD   BD Pen Needle Abi U/F 32G X 4 MM MISC 4 times daily 6/19/24   Eve Polk MD   Continuous Blood Gluc  (Dexcom G7 ) SUKHI Use  1 each continuous 7/31/23   Eve Polk MD   Continuous Glucose Sensor (Dexcom G7 Sensor) Use 1 Device every 10 days 12/24/24   Eve Polk MD   cyclobenzaprine (FLEXERIL) 5 mg tablet Take 1 tablet (5 mg total) by mouth 3 (three) times a day as needed for muscle spasms for up to 14 days 11/18/24 12/2/24  WAYNE Saavedra   fluconazole (DIFLUCAN) 150 mg tablet Take 1 tablet by mouth in the morning  Patient not taking: Reported on 5/6/2025 4/2/25   Historical Provider, MD   furosemide (LASIX) 20 mg tablet Take 40 mg by mouth in the morning.    Historical Provider, MD   Hydrocod Edgar-Chlorphe Edgar ER (TUSSIONEX) 10-8 mg/5 mL ER suspension Take 5 mL by mouth every 12 (twelve) hours as needed for cough Max Daily Amount: 10 mL  Patient not taking: Reported on 5/6/2025 4/14/25   Solis Avila MD   Insulin Disposable Pump (Omnipod 5 G6 Intro, Gen 5,) KIT Use 1 Units continuous  Patient not taking: Reported on 5/6/2025 3/6/24   Eve Polk MD   Insulin Disposable Pump (Omnipod 5 G6 Pods, Gen 5,) MISC Use 1 Units every other day  Patient not taking: Reported on 5/6/2025 3/6/24   Eve Polk MD   insulin glargine (LANTUS) 100 units/mL subcutaneous injection Inject 10 Units under the skin daily  Patient not taking: Reported on 5/15/2025 2/10/25   Historical Provider, MD   linezolid (ZYVOX) 600 mg tablet Take 600 mg by mouth 2 (two) times a day  Patient not taking: Reported on 5/6/2025 2/9/25   Historical Provider, MD   naloxone (NARCAN) 4 mg/0.1 mL nasal spray Administer 1 spray into a nostril. If no response after 2-3 minutes, give another dose in the other nostril using a new spray. 1/15/25 1/15/26  Solis Avila MD   pantoprazole (PROTONIX) 40 mg tablet Take 1 tablet (40 mg total) by mouth 2 (two) times a day before meals 11/18/24 12/18/24  WAYNE Saavedra   polyethylene glycol (MIRALAX) 17 g packet Take 17 g by mouth 2 (two) times a day 5/7/23   Armand Juarez MD   pregabalin  (LYRICA) 150 mg capsule Take 1 capsule (150 mg total) by mouth daily  Patient not taking: Reported on 5/15/2025 4/14/25 10/11/25  Solis Avila MD   traZODone (DESYREL) 50 mg tablet Take 0.5 tablets (25 mg total) by mouth daily at bedtime 9/26/21 11/8/22  WAYNE Paredes     Allergies   Allergen Reactions    Bebtelovimab Anaphylaxis    Vancomycin Angioedema and Hives    Gabapentin Hallucinations    Ceftazidime Headache     Severe headaches after desensitization    Nsaids Other (See Comments)     Lung transplant increases risk of renal toxicity, call lung txp provider to discuss if needed       Objective :  Temp:  [98.6 °F (37 °C)] 98.6 °F (37 °C)  HR:  [72-87] 81  BP: (135-187)/() 142/90  Resp:  [20-47] 24  SpO2:  [92 %-96 %] 96 %  O2 Device: None (Room air)    Physical Exam  Vitals and nursing note reviewed.   Constitutional:       General: She is not in acute distress.     Appearance: She is well-developed.   HENT:      Head: Normocephalic and atraumatic.     Eyes:      Conjunctiva/sclera: Conjunctivae normal.       Cardiovascular:      Rate and Rhythm: Normal rate and regular rhythm.      Heart sounds: No murmur heard.  Pulmonary:      Effort: Pulmonary effort is normal. No respiratory distress.      Breath sounds: Normal breath sounds.      Comments: Mild ronchi bilaterally  No wheezes  No rales    Some tachypnea with ambulation to bathroom  Abdominal:      Palpations: Abdomen is soft.      Tenderness: There is no abdominal tenderness.     Musculoskeletal:         General: No swelling.      Cervical back: Neck supple.     Skin:     General: Skin is warm and dry.      Capillary Refill: Capillary refill takes less than 2 seconds.      Comments: Port cath R chest intact     Neurological:      Mental Status: She is alert.     Psychiatric:         Mood and Affect: Mood normal.          Lines/Drains:  Lines/Drains/Airways       Active Status       Name Placement date Placement time Site Days    Port A Cath  03/22/22 Right Subclavian 03/22/22  0900  Subclavian  1150                    Central Line:  Goal for removal: Port accessed. Will de-access as appropriate.             Lab Results: I have reviewed the following results:  Results from last 7 days   Lab Units 05/15/25  0820   WBC Thousand/uL 6.13   HEMOGLOBIN g/dL 11.9   HEMATOCRIT % 39.3   PLATELETS Thousands/uL 143*   SEGS PCT % 64   LYMPHO PCT % 26   MONO PCT % 9   EOS PCT % 0     Results from last 7 days   Lab Units 05/15/25  0820   SODIUM mmol/L 135   POTASSIUM mmol/L 4.1   CHLORIDE mmol/L 104   CO2 mmol/L 22   BUN mg/dL 29*   CREATININE mg/dL 1.16   ANION GAP mmol/L 9   CALCIUM mg/dL 8.8   ALBUMIN g/dL 3.8   TOTAL BILIRUBIN mg/dL 0.30   ALK PHOS U/L 51   ALT U/L 21   AST U/L 14   GLUCOSE RANDOM mg/dL 330*     Results from last 7 days   Lab Units 05/15/25  0820   INR  1.03         Lab Results   Component Value Date    HGBA1C 7.8 (H) 12/16/2024    HGBA1C 7.5 (H) 09/21/2024    HGBA1C 7.6 (H) 05/27/2024     Results from last 7 days   Lab Units 05/15/25  1106 05/15/25  0820 05/10/25  2206   LACTIC ACID mmol/L 2.3* 3.3* 1.3   PROCALCITONIN ng/ml  --   --  <0.05       Imaging Results Review: I reviewed radiology reports from this admission including: CT chest.  Other Study Results Review: No additional pertinent studies reviewed.    Administrative Statements       ** Please Note: This note has been constructed using a voice recognition system. **

## 2025-05-15 NOTE — RESPIRATORY THERAPY NOTE
RT Protocol Note  Maximilian Mann 49 y.o. female MRN: [id number]  Unit/Bed#: ED 29 Encounter: [id number]    Assessment    Principal Problem:    Shortness of breath  Active Problems:    S/P pneumonectomy    Type 1 diabetes mellitus with diabetic chronic kidney disease, unspecified CKD stage (HCC)    Pancreatic insufficiency    Asthma    Chronic pain disorder    DVT (deep venous thrombosis) (HCC)    Anxiety    Immunosuppression (HCC)    Essential hypertension    Stage 3a chronic kidney disease (HCC)    Chronic rejection of lung transplant (HCC)      Home Pulmonary Medications:  Albuterol and hypertonic       Past Medical History:   Diagnosis Date    Abnormal uterine bleeding (AUB) 7/7/2022    ABPA (allergic bronchopulmonary aspergillosis) ([facility]) 07/08/2012    Acute kidney injury (HCC) 09/08/2017    Acute on chronic respiratory failure ([facility]) 03/04/2017    Last Assessment & Plan:  Formatting of this note might be different from the original. Recurrent with clinical worsening with enlarging PTX, s/p multiple chest tubes, but new fevers 6/19 and worsening leukocytosis indicating infection as cause of 6/19 decompensation with profound hypoxia in setting of shunting through right lung and continued pneumothorax s/p 2nd chest tube placement.  6/27: Right    Anaphylaxis 06/06/2022    Anemia     Asthma     Chest pain 09/21/2021    Chronic pain     Chronic respiratory failure with hypoxia (HCC) 09/07/2021    COVID-19 06/03/2022    Cystic fibrosis (HCC)     Deep vein thrombosis (HCC)     Diabetes mellitus ([facility])     Fever 09/22/2024    GERD (gastroesophageal reflux disease)     HBP (high blood pressure)     HCAP (healthcare-associated pneumonia) 03/06/2020    History of transfusion     Influenza B 02/23/2020    Kidney stone     Moderate protein-calorie malnutrition (HCC) 01/08/2022    Neutropenic fever  ([facility]) 09/26/2020    Pneumonia due to Pseudomonas species ([facility]) 10/28/2010    Last Assessment & Plan:  Formatting of this  note might be different from the original. Polymicrobial pna w/ pseudomonas, M. Abscessus, Cadida glabrata/albicans    PONV (postoperative nausea and vomiting)     Pseudomonas aeruginosa infection 2012    Renal calculus     Squamous cell skin cancer 2023    SCCIS- Left upper thigh    Transplant recipient     Lung- ,     Ulcerative colitis (HCC)     Wellness examination 2013    Formatting of this note might be different from the original. Discussed with patient and acknowledgement form signed on 2013 (ALYSHA)     Social History     Socioeconomic History    Marital status: /Civil Union     Spouse name: None    Number of children: None    Years of education: 12    Highest education level: None   Occupational History    Occupation: Disabled    Tobacco Use    Smoking status: Former     Current packs/day: 0.00     Average packs/day: 0.5 packs/day for 12.0 years (6.0 ttl pk-yrs)     Types: Cigarettes     Start date: 1993     Quit date: 2005     Years since quittin.3     Passive exposure: Past    Smokeless tobacco: Never   Vaping Use    Vaping status: Never Used   Substance and Sexual Activity    Alcohol use: Yes     Alcohol/week: 2.0 standard drinks of alcohol     Types: 2 Glasses of wine per week     Comment: social    Drug use: Yes     Frequency: 2.0 times per week     Types: Marijuana     Comment: medical edible marijuana prescribed    Sexual activity: Yes     Partners: Male     Birth control/protection: None   Other Topics Concern    None   Social History Narrative    Most recent tobacco use screenin2019    Do you currently or have you served in the [employer]: No    Were you activated, into active duty, as a member of the [employer] or as a Reservist: No    Occupation: disabled    Education: 12    Marital status:     Exercise level: Occasional    Diet: Specific    low sugar, high fat    General stress level: High    Alcohol intake: Occasional     Caffeine intake: Heavy    Chewing tobacco: none    Illicit drugs: none    Guns present in home: No    Seat belts used routinely: Yes    Smoke alarm in home: Yes    Advance directive: Yes     Social Drivers of Health     Financial Resource Strain: Low Risk  (11/10/2022)    Received from [facility]    Overall Financial Resource Strain (CARDIA)     Difficulty of Paying Living Expenses: Not hard at all   Food Insecurity: No Food Insecurity (1/30/2025)    Received from [facility]    Hunger Vital Sign     Within the past 12 months, you worried that your food would run out before you got the money to buy more.: Never true     Within the past 12 months, the food you bought just didn't last and you didn't have money to get more.: Never true   Transportation Needs: No Transportation Needs (1/30/2025)    Received from [facility]    PRAPARE - Transportation     Lack of Transportation (Medical): No     Lack of Transportation (Non-Medical): No   Physical Activity: Insufficiently Active (1/15/2024)    Exercise Vital Sign     Days of Exercise per Week: 7 days     Minutes of Exercise per Session: 20 min   Stress: No Stress Concern Present (1/15/2024)    [employer] - Occupational Stress Questionnaire     Feeling of Stress : Only a little   Social Connections: Unknown (6/18/2024)    Received from [facility]    Social Connections     How often do you feel lonely or isolated from those around you? (Adult - for ages 18 years and over): Not on file   Intimate Partner Violence: Unknown (1/20/2025)    Nursing IPS     Feels Physically and Emotionally Safe: Not on file     Physically Hurt by Someone: Not on file     Humiliated or Emotionally Abused by Someone: Not on file     Physically Hurt by Someone: No     Hurt or Threatened by Someone: No   Housing Stability: High Risk (1/30/2025)    Received from [facility] [address]    Housing Stability Vital Sign     In the last 12 months, was there a time when you were not able to pay the mortgage or rent on time?: No     In the past 12 months, how many times have you moved where you were living?: 2     At any time in the past 12 months, were you homeless or living in a shelter (including now)?: No       Subjective         Objective    Physical Exam:   Assessment Type: Assess only  General Appearance: Alert, Awake  Respiratory Pattern: Labored, Dyspnea with exertion  Bilateral Breath Sounds: Diminished  Cough: Dry    Vitals:  Blood pressure 142/90, pulse 81, temperature 98.6 °F (37 °C), temperature source Temporal, resp. rate 18, last menstrual period 04/10/2025, SpO2 (!) 88%, not currently breastfeeding.          Imaging and other studies:           Plan    Respiratory Plan: Mild Distress pathway, Home Bronchodilator Patient pathway        Resp Comments: PT admitted for shortness of breath, has HX of cystic fibrosis and lung transplant. Spoke to pt about home regiment of saline with albuterol twice a day with as needed bronchodilators throughout the day. place pt on home schedule per protocol

## 2025-05-15 NOTE — ED PROVIDER NOTES
Time reflects when diagnosis was documented in both MDM as applicable and the Disposition within this note       Time User Action Codes Description Comment    5/15/2025 10:51 AM RangelJose Add [R06.00] Dyspnea     5/15/2025 10:51 AM RangelJose Add [Z94.2] Lung transplant recipient (HCC)           ED Disposition       ED Disposition   Admit    Condition   Stable    Date/Time   Thu May 15, 2025 10:51 AM    Comment   Case was discussed with Dr Lawrence and the patient's admission status was agreed to be Admission Status: inpatient status to the service of Dr. Lawrence .               Assessment & Plan       Medical Decision Making  Problems Addressed:  Dyspnea: complicated acute illness or injury that poses a threat to life or bodily functions  Lung transplant recipient (HCC): chronic illness or injury that poses a threat to life or bodily functions    Amount and/or Complexity of Data Reviewed  Labs: ordered. Decision-making details documented in ED Course.  Radiology: ordered and independent interpretation performed.  ECG/medicine tests: ordered and independent interpretation performed.    Risk  Prescription drug management.  Decision regarding hospitalization.             Medications   atovaquone (MEPRON) oral suspension 1,500 mg (0 mg Oral Hold 5/15/25 1348)   pancrelipase (Lip-Prot-Amyl) (CREON) delayed release capsule 48,000 Units (48,000 Units Oral Given 5/15/25 1416)   valACYclovir (VALTREX) tablet 500 mg (500 mg Oral Given 5/15/25 1337)   albuterol inhalation solution 5 mg ( Nebulization Canceled Entry 5/15/25 1317)   acetaminophen (TYLENOL) tablet 975 mg (975 mg Oral Given 5/15/25 1337)   azithromycin (ZITHROMAX) tablet 250 mg (has no administration in time range)   Cholecalciferol (VITAMIN D3) tablet 5,000 Units (5,000 Units Oral Given 5/15/25 1337)   cyanocobalamin (VITAMIN B-12) tablet 1,000 mcg (1,000 mcg Oral Given 5/15/25 1337)   bisacodyl (DULCOLAX) EC tablet 5 mg (has no administration in time range)    polyethylene glycol (MIRALAX) packet 17 g (17 g Oral Given 5/15/25 1336)   apixaban (ELIQUIS) tablet 5 mg (5 mg Oral Given 5/15/25 1337)   oxybutynin (DITROPAN-XL) 24 hr tablet 15 mg (has no administration in time range)   furosemide (LASIX) tablet 40 mg (40 mg Oral Given 5/15/25 1337)   budesonide-formoterol (SYMBICORT) 160-4.5 mcg/act inhaler 2 puff (2 puffs Inhalation Not Given 5/15/25 1348)   famotidine (PEPCID) tablet 40 mg (has no administration in time range)   magnesium gluconate (MAGONATE) tablet 500 mg (500 mg Oral Given 5/15/25 1353)   tacrolimus (PROGRAF) capsule 3 mg (3 mg Oral Given 5/15/25 1415)   ferrous sulfate tablet 325 mg (has no administration in time range)   escitalopram (LEXAPRO) tablet 5 mg (has no administration in time range)   oxyCODONE (ROXICODONE) immediate release tablet 10 mg (has no administration in time range)   tretinoin (RETIN-A) 0.025 % cream (has no administration in time range)   ALPRAZolam (XANAX) tablet 1 mg (1 mg Oral Given 5/15/25 1352)   pantoprazole (PROTONIX) EC tablet 40 mg (has no administration in time range)   insulin lispro (HumALOG/ADMELOG) 100 units/mL subcutaneous injection 8 Units (8 Units Subcutaneous Not Given 5/15/25 1359)   insulin glargine (LANTUS) subcutaneous injection 10 Units 0.1 mL (10 Units Subcutaneous Given 5/15/25 1543)   predniSONE tablet 40 mg (40 mg Oral Given 5/15/25 1337)   HYDROmorphone (DILAUDID) injection 0.5 mg (0.5 mg Intravenous Given 5/15/25 1242)   insulin lispro (HumALOG/ADMELOG) 100 units/mL subcutaneous injection 1-5 Units ( Subcutaneous Not Given 5/15/25 1356)   linezolid (ZYVOX) IVPB (premix in dextrose) 600 mg 300 mL (has no administration in time range)   piperacillin-tazobactam (ZOSYN) IVPB (EXTENDED INFUSION) 4.5 g (4.5 g Intravenous New Bag 5/15/25 5251)   ascorbic acid (VITAMIN C) tablet 500 mg (has no administration in time range)   albuterol (PROVENTIL HFA,VENTOLIN HFA) inhaler 2 puff (has no administration in time  range)   sodium chloride 3 % inhalation solution 4 mL (has no administration in time range)   HYDROmorphone (DILAUDID) injection 1 mg (1 mg Intravenous Given 5/15/25 0922)   iohexol (OMNIPAQUE) 350 MG/ML injection (MULTI-DOSE) 85 mL (85 mL Intravenous Given 5/15/25 0933)   sodium chloride 0.9 % bolus 1,000 mL (0 mL Intravenous Stopped 5/15/25 1153)   piperacillin-tazobactam (ZOSYN) IVPB 4.5 g (0 g Intravenous Stopped 5/15/25 1123)   linezolid (ZYVOX) IVPB (premix in dextrose) 600 mg 300 mL (0 mg Intravenous Stopped 5/15/25 1258)       ED Risk Strat Scores                    No data recorded                            History of Present Illness       Chief Complaint   Patient presents with    Shortness of Breath     Patient states that she was here on Saturday. Transplant patient. Functions off of one lung on the left. Patient states that she is in chronic rejection. States that she has had increased shortness of breath over the last few days. [facility] sent patient to the ER for possible transfer to their facility.        Past Medical History:   Diagnosis Date    Abnormal uterine bleeding (AUB) 7/7/2022    ABPA (allergic bronchopulmonary aspergillosis) (HCC) 07/08/2012    Acute kidney injury (HCC) 09/08/2017    Acute on chronic respiratory failure (HCC) 03/04/2017    Last Assessment & Plan:  Formatting of this note might be different from the original. Recurrent with clinical worsening with enlarging PTX, s/p multiple chest tubes, but new fevers 6/19 and worsening leukocytosis indicating infection as cause of 6/19 decompensation with profound hypoxia in setting of shunting through right lung and continued pneumothorax s/p 2nd chest tube placement.  6/27: Right    Anaphylaxis 06/06/2022    Anemia     Asthma     Chest pain 09/21/2021    Chronic pain     Chronic respiratory failure with hypoxia (HCC) 09/07/2021    COVID-19 06/03/2022    Cystic fibrosis (HCC)     Deep vein thrombosis (HCC)     Diabetes mellitus (HCC)      Fever 09/22/2024    GERD (gastroesophageal reflux disease)     HBP (high blood pressure)     HCAP (healthcare-associated pneumonia) 03/06/2020    History of transfusion     Influenza B 02/23/2020    Kidney stone     Moderate protein-calorie malnutrition (HCC) 01/08/2022    Neutropenic fever  (HCC) 09/26/2020    Pneumonia due to Pseudomonas species (HCC) 10/28/2010    Last Assessment & Plan:  Formatting of this note might be different from the original. Polymicrobial pna w/ pseudomonas, M. Abscessus, Cadida glabrata/albicans    PONV (postoperative nausea and vomiting)     Pseudomonas aeruginosa infection 09/20/2012    Renal calculus     Squamous cell skin cancer 01/12/2023    SCCIS- Left upper thigh    Transplant recipient     Lung- 2013, 2017    Ulcerative colitis (HCC)     Wellness examination 02/04/2013    Formatting of this note might be different from the original. Discussed with patient and acknowledgement form signed on 2/4/2013 (ALYSHA)      Past Surgical History:   Procedure Laterality Date    APPENDECTOMY      AUGMENTATION MAMMAPLASTY Bilateral 2004    AUGMENTATION MAMMAPLASTY Right 2021    removed     BREAST SURGERY      Aug.     BRONCHOSCOPY      CHOLECYSTECTOMY      COLONOSCOPY      EGD  07/2019    Yeast Infect.     FL RETROGRADE PYELOGRAM  01/09/2020    FL RETROGRADE PYELOGRAM  02/04/2020    FL RETROGRADE PYELOGRAM  9/20/2024    GASTROSCOPY  03/01/2024    GASTROSTOMY TUBE, PLACE      IR BALLOON-OCCLUDED ANTEGRADE TRANSVENOUS OBLITERATION (BATO)  11/22/2022    IR PORT PLACEMENT  03/22/2022    IR PORT REMOVAL  01/10/2022    IR PORT STRIPPING  11/25/2022    IR TUNNELED CENTRAL LINE PLACEMENT  01/13/2022    LUNG SURGERY      LUNG TRANSPLANT      X2    MO CYSTO/URETERO W/LITHOTRIPSY &INDWELL STENT INSRT Right 02/04/2020    Procedure: CYSTOSCOPY URETEROSCOPY WITH LITHOTRIPSY HOLMIUM LASER, RETROGRADE PYELOGRAM AND INSERTION STENT URETERAL;  Surgeon: Patricio Perry MD;  Location: [facility];  Service:  Urology    FL CYSTO/URETERO W/LITHOTRIPSY &INDWELL STENT INSRT Left 9/20/2024    Procedure: CYSTOSCOPY URETEROSCOPY WITH LITHOTRIPSY HOLMIUM LASER, RETROGRADE PYELOGRAM AND INSERTION STENT URETERAL, BASKET STONE EXTRACTION;  Surgeon: Lee Briceno MD;  Location:  MAIN OR;  Service: Urology    FL CYSTOURETHROSCOPY W/URETERAL CATHETERIZATION Right 01/09/2020    Procedure: CYSTOSCOPY RETROGRADE PYELOGRAM WITH INSERTION STENT URETERAL;  Surgeon: Patricio Perry MD;  Location: [facility];  Service: Urology    SKIN BIOPSY      US GUIDED VASCULAR ACCESS  01/17/2017      Family History   Problem Relation Age of Onset    Alcohol abuse Mother     Mental illness Mother     Depression Mother     Alcohol abuse Father     COPD Maternal Grandmother     Lung cancer Maternal Grandmother 65    Cancer Maternal Grandmother     COPD Maternal Grandfather     Cancer Maternal Grandfather     COPD Paternal Grandmother     Lung cancer Paternal Grandmother 65    No Known Problems Sister     No Known Problems Paternal Aunt     Breast cancer Neg Hx     Ovarian cancer Neg Hx     Colon cancer Neg Hx       Social History[1]   E-Cigarette/Vaping    E-Cigarette Use Never User       E-Cigarette/Vaping Substances    Nicotine No     THC No     CBD No     Flavoring No     Other No     Unknown No       I have reviewed and agree with the history as documented.     50 yo female with distant h/o lung transplant and chronic rejection who follows with [facility] for same who presents to ED for worsening dyspnea with minimal exertion. Not hypoxic or on oxygen at home. Similar sxs in past due to MRSA PNA with severe reaction to vanc, transplant coordinator who supplies additional history reports previously treated with linezolid and zosyn and recommends same today. No CP. No hemoptysis. No syncope. Sxs resolved at rest but occur with minimal exertion or ambulation.         Review of Systems   Respiratory:  Positive for shortness of breath.             Objective       ED Triage Vitals   Temperature Pulse Blood Pressure Respirations SpO2 Patient Position - Orthostatic VS   05/15/25 0726 05/15/25 0726 05/15/25 0726 05/15/25 0726 05/15/25 0726 05/15/25 0726   98.6 °F (37 °C) 87 (!) 170/104 20 95 % Sitting      Temp Source Heart Rate Source BP Location FiO2 (%) Pain Score    05/15/25 0726 05/15/25 0726 05/15/25 0726 -- 05/15/25 0922    Temporal Monitor Left arm  8      Vitals      Date and Time Temp Pulse SpO2 Resp BP Pain Score FACES Pain Rating User   05/15/25 1530 -- 80 93 % 18 -- -- -- TONY   05/15/25 1413 -- 81 88 % -- -- -- --    05/15/25 1400 -- 75 97 % 18 -- -- --    05/15/25 1337 -- -- -- -- -- 10 - Worst Possible Pain --    05/15/25 1315 -- 81 96 % 24 -- -- --    05/15/25 1242 -- -- -- -- -- 10 - Worst Possible Pain --    05/15/25 1230 -- 72 94 % 28 142/90 -- --    05/15/25 1200 -- 80 95 % 28 pt returned from bathroom 173/101 -- --    05/15/25 1130 -- 84 92 % 21 166/97 -- --    05/15/25 1100 -- 76 93 % 20 135/96 -- --    05/15/25 1000 -- 81 95 % 22 157/105 -- --    05/15/25 0930 -- 79 95 % 47 166/97 -- --    05/15/25 0922 -- -- -- -- -- 8 --    05/15/25 0830 -- 76 95 % 36 187/121 -- --    05/15/25 0726 98.6 °F (37 °C) 87 95 % 20 170/104 -- -- AS            Physical Exam  Vitals and nursing note reviewed.   Constitutional:       General: She is not in acute distress.     Appearance: She is well-developed. She is not ill-appearing, toxic-appearing or diaphoretic.   HENT:      Head: Normocephalic and atraumatic.      Mouth/Throat:      Mouth: Mucous membranes are moist.      Pharynx: Oropharynx is clear.     Eyes:      Conjunctiva/sclera: Conjunctivae normal.      Pupils: Pupils are equal, round, and reactive to light.     Neck:      Vascular: No JVD.     Cardiovascular:      Rate and Rhythm: Normal rate and regular rhythm.      Pulses: Normal pulses.      Heart sounds: Normal heart sounds.   Pulmonary:      Effort: Pulmonary  effort is normal. No tachypnea, accessory muscle usage or respiratory distress.      Breath sounds: Normal breath sounds. No stridor.   Abdominal:      General: There is no distension.      Palpations: Abdomen is soft.      Tenderness: There is no abdominal tenderness. There is no guarding or rebound.     Musculoskeletal:         General: No tenderness or deformity. Normal range of motion.      Cervical back: Normal range of motion and neck supple. No rigidity.     Skin:     General: Skin is warm and dry.      Capillary Refill: Capillary refill takes less than 2 seconds.      Coloration: Skin is not jaundiced or pale.      Findings: No bruising, erythema or rash.     Neurological:      General: No focal deficit present.      Mental Status: She is alert and oriented to person, place, and time.      Cranial Nerves: No cranial nerve deficit.      Sensory: No sensory deficit.      Motor: No weakness or abnormal muscle tone.      Coordination: Coordination normal.      Gait: Gait normal.         Results Reviewed       Procedure Component Value Units Date/Time    Respiratory Panel 2.1(RP2)with COVID19 [[id number]]     Lab Status: No result Specimen: Nasopharyngeal Swab     Fingerstick Glucose (POCT) [[id number]]  (Abnormal) Collected: 05/15/25 1352    Lab Status: Final result Specimen: Blood Updated: 05/15/25 1356     POC Glucose 173 mg/dl     Blood culture #2 [[id number]] Collected: 05/15/25 0857    Lab Status: Preliminary result Specimen: Blood from Hand, Left Updated: 05/15/25 1314     Blood Culture Received in Microbiology Lab. Culture in Progress.    Hemoglobin A1c w/EAG Estimation (Prechecked if no A1C within 90 days) [[id number]] Collected: 05/15/25 0820    Lab Status: In process Specimen: Blood from Line Updated: 05/15/25 1313    Lactic acid 2 Hours [[id number]]  (Abnormal) Collected: 05/15/25 1106    Lab Status: Final result Specimen: Blood from Arm, Right Updated: 05/15/25 1135     LACTIC ACID 2.3 mmol/L      Narrative:      Result may be elevated if tourniquet was used during collection.    Blood culture #1 [[id number]] Collected: 05/15/25 1008    Lab Status: In process Specimen: Blood from Arm, Left Updated: 05/15/25 1010    Lactic acid, plasma (w/reflex if result > 2.0) [[id number]]  (Abnormal) Collected: 05/15/25 0820    Lab Status: Final result Specimen: Blood from Line Updated: 05/15/25 0856     LACTIC ACID 3.3 mmol/L     Narrative:      Result may be elevated if tourniquet was used during collection.    HS Troponin 0hr (reflex protocol) [[id number]]  (Normal) Collected: 05/15/25 0820    Lab Status: Final result Specimen: Blood from Central Venous Line Updated: 05/15/25 0855     hs TnI 0hr 23 ng/L     B-Type Natriuretic Peptide(BNP) [[id number]]  (Abnormal) Collected: 05/15/25 0820    Lab Status: Final result Specimen: Blood from Line Updated: 05/15/25 0855      pg/mL     FLU/COVID Rapid Antigen (30 min. TAT) - Preferred screening test in ED [[id number]]  (Normal) Collected: 05/15/25 0820    Lab Status: Final result Specimen: Nares from Nose Updated: 05/15/25 0853     SARS COV Rapid Antigen Negative     Influenza A Rapid Antigen Negative     Influenza B Rapid Antigen Negative    Narrative:      This test has been performed using the Quidel Nel 2 FLU+SARS Antigen test under the Emergency Use Authorization (EUA). This test has been validated by the  and verified by the performing laboratory. The Nel uses lateral flow immunofluorescent sandwich assay to detect SARS-COV, Influenza A and Influenza B Antigen.     The Quidel Nel 2 SARS Antigen test does not differentiate between SARS-CoV and SARS-CoV-2.     Negative results are presumptive and may be confirmed with a molecular assay, if necessary, for patient management. Negative results do not rule out SARS-CoV-2 or influenza infection and should not be used as the sole basis for treatment or patient management decisions. A negative test result may  occur if the level of antigen in a sample is below the limit of detection of this test.     Positive results are indicative of the presence of viral antigens, but do not rule out bacterial infection or co-infection with other viruses.     All test results should be used as an adjunct to clinical observations and other information available to the provider.    FOR PEDIATRIC PATIENTS - copy/paste COVID Guidelines URL to browser: [email or web address]    Basic metabolic panel [[id number]]  (Abnormal) Collected: 05/15/25 0820    Lab Status: Final result Specimen: Blood from Central Venous Line Updated: 05/15/25 0852     Sodium 135 mmol/L      Potassium 4.1 mmol/L      Chloride 104 mmol/L      CO2 22 mmol/L      ANION GAP 9 mmol/L      BUN 29 mg/dL      Creatinine 1.16 mg/dL      Glucose 330 mg/dL      Calcium 8.8 mg/dL      eGFR 56 ml/min/1.73sq m     Narrative:      National Kidney Disease Foundation guidelines for Chronic Kidney Disease (CKD):     Stage 1 with normal or high GFR (GFR > 90 mL/min/1.73 square meters)    Stage 2 Mild CKD (GFR = 60-89 mL/min/1.73 square meters)    Stage 3A Moderate CKD (GFR = 45-59 mL/min/1.73 square meters)    Stage 3B Moderate CKD (GFR = 30-44 mL/min/1.73 square meters)    Stage 4 Severe CKD (GFR = 15-29 mL/min/1.73 square meters)    Stage 5 End Stage CKD (GFR <15 mL/min/1.73 square meters)  Note: GFR calculation is accurate only with a steady state creatinine    Hepatic function panel [[id number]]  (Normal) Collected: 05/15/25 0820    Lab Status: Final result Specimen: Blood from Central Venous Line Updated: 05/15/25 0852     Total Bilirubin 0.30 mg/dL      Bilirubin, Direct 0.06 mg/dL      Alkaline Phosphatase 51 U/L      AST 14 U/L      ALT 21 U/L      Total Protein 6.6 g/dL      Albumin 3.8 g/dL     Protime-INR [[id number]]  (Normal) Collected: 05/15/25 0820    Lab Status: Final result Specimen: Blood from Central Venous Line  Updated: 05/15/25 0849     Protime 14.2 seconds      INR 1.03    Narrative:      INR Therapeutic Range    Indication                                             INR Range      Atrial Fibrillation                                               2.0-3.0  Hypercoagulable State                                    2.0.2.3  Left Ventricular Asist Device                            2.0-3.0  Mechanical Heart Valve                                  -    Aortic(with afib, MI, embolism, HF, LA enlargement,    and/or coagulopathy)                                     2.0-3.0 (2.5-3.5)     Mitral                                                             2.5-3.5  Prosthetic/Bioprosthetic Heart Valve               2.0-3.0  Venous thromboembolism (VTE: VT, PE        2.0-3.0    APTT [[id number]]  (Normal) Collected: 05/15/25 0820    Lab Status: Final result Specimen: Blood from Central Venous Line Updated: 05/15/25 0849     PTT 26 seconds     CBC and differential [[id number]]  (Abnormal) Collected: 05/15/25 0820    Lab Status: Final result Specimen: Blood from Line Updated: 05/15/25 0835     WBC 6.13 Thousand/uL      RBC 4.15 Million/uL      Hemoglobin 11.9 g/dL      Hematocrit 39.3 %      MCV 95 fL      MCH 28.7 pg      MCHC 30.3 g/dL      RDW 16.1 %      MPV 9.7 fL      Platelets 143 Thousands/uL      nRBC 0 /100 WBCs      Segmented % 64 %      Immature Grans % 1 %      Lymphocytes % 26 %      Monocytes % 9 %      Eosinophils Relative 0 %      Basophils Relative 0 %      Absolute Neutrophils 3.93 Thousands/µL      Absolute Immature Grans 0.03 Thousand/uL      Absolute Lymphocytes 1.57 Thousands/µL      Absolute Monocytes 0.57 Thousand/µL      Eosinophils Absolute 0.02 Thousand/µL      Basophils Absolute 0.01 Thousands/µL     Blood gas, venous [[id number]]  (Abnormal) Collected: 05/15/25 0820    Lab Status: Final result Specimen: Blood from Line Updated: 05/15/25 0830     pH, Melvin 7.326     pCO2, Melvin 36.0 mm Hg      pO2, Melvin 39.0 mm Hg       HCO3, Melvin 18.4 mmol/L      Base Excess, Melvin -6.8 mmol/L      O2 Content, Melvin 13.4 ml/dL      O2 HGB, VENOUS 73.6 %     Tacrolimus level [[id number]] Collected: 05/15/25 0820    Lab Status: In process Specimen: Blood from Line Updated: 05/15/25 0827            CTA chest pe study   Final Interpretation by Miguel Ángel Toth MD (05/15 0956)      Status post right pneumonectomy and left lung transplant.      Similar findings of a mild diffuse bronchiolitis without new findings.                  Workstation performed: [id number]         XR chest 1 view portable   ED Interpretation by oJse Rangel MD (05/15 0826)   No change as compared to 5/10/25      Final Interpretation by Raymon Parry MD (05/15 1156)      No significant interval change. No left-sided consolidation, pleural effusion, or pneumothorax.            Resident: Kermit Hanna I, the attending radiologist, have reviewed the images and agree with the final report above.      Workstation performed: [id number]             Procedures    ED Medication and Procedure Management   Prior to Admission Medications   Prescriptions Last Dose Informant Patient Reported? Taking?   ALPRAZolam (XANAX) 1 mg tablet 2025 Self No Yes   Sig: Take 1 tablet (1 mg total) by mouth 2 (two) times a day as needed for anxiety or sleep   Ascorbic Acid (vitamin C) 100 MG tablet 2025 Self Yes Yes   Sig: Take 100 mg by mouth in the morning.   BD Pen Needle Abi U/F 32G X 4 MM MISC Unknown Self No No   Si times daily   Cholecalciferol (Vitamin D3) 125 MCG (5000 UT) TABS 2025 Self Yes Yes   Sig: Take 5,000 Units by mouth in the morning.   Continuous Blood Gluc  (Dexcom G7 ) SUKHI Unknown Self No No   Sig: Use 1 each continuous   Continuous Glucose Sensor (Dexcom G7 Sensor) Unknown Self No No   Sig: Use 1 Device every 10 days   Hydrocod Edgar-Chlorphe Edgar ER (TUSSIONEX) 10-8 mg/5 mL ER suspension Not Taking Self No No   Sig: Take  5 mL by mouth every 12 (twelve) hours as needed for cough Max Daily Amount: 10 mL   Patient not taking: Reported on 5/6/2025   Insulin Disposable Pump (Omnipod 5 G6 Intro, Gen 5,) KIT Not Taking Self No No   Sig: Use 1 Units continuous   Patient not taking: Reported on 5/6/2025   Insulin Disposable Pump (Omnipod 5 G6 Pods, Gen 5,) MISC Not Taking Self No No   Sig: Use 1 Units every other day   Patient not taking: Reported on 5/6/2025   Insulin Pen Needle (Comfort EZ Pen Needles) 33G X 4 MM MISC 5/14/2025 Self No Yes   Sig: Use to inject insulin 4 times a day   Respiratory Therapy Supplies (Nebulizer) SUKHI 5/14/2025 Self Yes Yes   Sodium Chloride 7 % NEBU 5/14/2025 Self Yes Yes   Turmeric 500 MG CAPS 5/14/2025 Self Yes Yes   Sig: Take by mouth   acetaminophen (TYLENOL) 325 mg tablet 5/15/2025 Self No Yes   Sig: Take 3 tablets (975 mg total) by mouth every 8 (eight) hours   albuterol (2.5 mg/3 mL) 0.083 % nebulizer solution 5/14/2025 Self No Yes   Sig: Take 6 mL (5 mg total) by nebulization 2 (two) times a day   albuterol (PROVENTIL HFA,VENTOLIN HFA) 90 mcg/act inhaler 5/14/2025 Self Yes Yes   apixaban (ELIQUIS) 5 mg 5/14/2025 Self No Yes   Sig: Take 1 tablet (5 mg total) by mouth 2 (two) times a day   atovaquone (MEPRON) 750 mg/5 mL suspension 5/14/2025 Self Yes Yes   Sig: Take 1,500 mg by mouth in the morning.   azelastine (OPTIVAR) 0.05 % ophthalmic solution Not Taking Self No No   Sig: INSTILL 1 DROP INTO BOTH EYES TWICE A DAY   Patient not taking: No sig reported   azithromycin (ZITHROMAX) 250 mg tablet 5/14/2025 Self Yes Yes   Sig: Take 250 mg by mouth 3 (three) times a week   bisacodyl (DULCOLAX) 5 mg EC tablet Past Week Self No Yes   Sig: Take 1 tablet (5 mg total) by mouth daily at bedtime   budesonide-formoterol (SYMBICORT) 160-4.5 mcg/act inhaler 5/15/2025 Self Yes Yes   Sig: Inhale 2 puffs every 12 (twelve) hours   cyclobenzaprine (FLEXERIL) 5 mg tablet   No No   Sig: Take 1 tablet (5 mg total) by mouth 3  (three) times a day as needed for muscle spasms for up to 14 days   dexlansoprazole (DEXILANT) 60 MG capsule 5/14/2025 Evening Self No Yes   Sig: Take 1 capsule (60 mg total) by mouth daily   dronabinol (MARINOL) 5 MG capsule 5/14/2025 Self No Yes   Sig: Take 1 capsule (5 mg total) by mouth 3 (three) times a day   escitalopram (LEXAPRO) 5 mg tablet 5/14/2025 Self No Yes   Sig: Take 1 tablet (5 mg total) by mouth every other day   famotidine (PEPCID) 40 MG tablet 5/14/2025 Self No Yes   Sig: TAKE 1 TABLET BY MOUTH EVERYDAY AT BEDTIME   ferrous sulfate 325 (65 Fe) mg tablet 5/14/2025 Self Yes Yes   fluconazole (DIFLUCAN) 150 mg tablet Not Taking Self Yes No   Sig: Take 1 tablet by mouth in the morning   Patient not taking: Reported on 5/6/2025   furosemide (LASIX) 20 mg tablet More than a month Self Yes No   Sig: Take 40 mg by mouth in the morning.   insulin aspart (NovoLOG FlexPen) 100 UNIT/ML injection pen 5/14/2025 Self No Yes   Sig: INJECT 10 UNITS pre-BREAKFAST, 8 UNITS pre-LUNCH and 12 UNITS pre-DINNER plus scale; max 50 UNITS/day   insulin degludec (Tresiba FlexTouch) 100 units/mL injection pen 5/14/2025 Self No Yes   Sig: Inject 12 Units under the skin daily   insulin glargine (LANTUS) 100 units/mL subcutaneous injection Not Taking Self Yes No   Sig: Inject 10 Units under the skin daily   Patient not taking: Reported on 5/15/2025   linezolid (ZYVOX) 600 mg tablet Not Taking Self Yes No   Sig: Take 600 mg by mouth 2 (two) times a day   Patient not taking: Reported on 5/6/2025   magnesium gluconate (MAGONATE) 500 mg tablet 5/14/2025 Self Yes Yes   Sig: Take 500 mg by mouth in the morning and 500 mg in the evening.   naloxone (NARCAN) 4 mg/0.1 mL nasal spray Unknown Self No No   Sig: Administer 1 spray into a nostril. If no response after 2-3 minutes, give another dose in the other nostril using a new spray.   ondansetron (ZOFRAN) 4 mg tablet 5/15/2025 Self Yes Yes   Sig: Take 4 mg by mouth every 8 (eight) hours  as needed for nausea   oxyCODONE (ROXICODONE) 10 MG TABS 5/14/2025 Self No Yes   Sig: Take 1 tablet (10 mg total) by mouth every 4 (four) hours as needed for moderate pain Max Daily Amount: 60 mg   oxybutynin (DITROPAN XL) 15 MG 24 hr tablet 5/14/2025 Self No Yes   Sig: Take 1 tablet (15 mg total) by mouth daily at bedtime   pancrelipase, Lip-Prot-Amyl, (CREON) 24,000 units 5/14/2025 Self Yes Yes   Sig: Take 48,000 Units by mouth in the morning and 48,000 Units at noon and 48,000 Units in the evening. Take with meals.   pantoprazole (PROTONIX) 40 mg tablet   No No   Sig: Take 1 tablet (40 mg total) by mouth 2 (two) times a day before meals   polyethylene glycol (MIRALAX) 17 g packet More than a month Self No No   Sig: Take 17 g by mouth 2 (two) times a day   predniSONE 10 mg tablet 5/14/2025 Self Yes Yes   Sig: Take 1.5 tablets by mouth in the morning   pregabalin (LYRICA) 150 mg capsule Not Taking Self No No   Sig: Take 1 capsule (150 mg total) by mouth daily   Patient not taking: Reported on 5/15/2025   tacrolimus (PROGRAF) 1 mg capsule 5/14/2025 Self No Yes   Sig: Take 3 capsules (3 mg total) by mouth 2 (two) times a day   tretinoin (RETIN-A) 0.025 % cream 5/14/2025 Self No Yes   Sig: Apply pea sized amount to entire face nightly and follow with moisturizer   valACYclovir (VALTREX) 500 mg tablet 5/14/2025 Self Yes Yes   Sig: Take 500 mg by mouth in the morning.   vitamin B-12 (VITAMIN B-12) 1,000 mcg tablet 5/14/2025 Self Yes Yes   Sig: Take by mouth in the morning.      Facility-Administered Medications: None     Patient's Medications   Discharge Prescriptions    No medications on file     No discharge procedures on file.  ED SEPSIS DOCUMENTATION   Time reflects when diagnosis was documented in both MDM as applicable and the Disposition within this note       Time User Action Codes Description Comment    5/15/2025 10:51 AM Jose Rangel Add [R06.00] Dyspnea     5/15/2025 10:51 AM Jose Rangel Add [Z94.2] Lung  transplant recipient (HCC)                      [1]   Social History  Tobacco Use    Smoking status: Former     Current packs/day: 0.00     Average packs/day: 0.5 packs/day for 12.0 years (6.0 ttl pk-yrs)     Types: Cigarettes     Start date: 1993     Quit date: 2005     Years since quittin.3     Passive exposure: Past    Smokeless tobacco: Never   Vaping Use    Vaping status: Never Used   Substance Use Topics    Alcohol use: Yes     Alcohol/week: 2.0 standard drinks of alcohol     Types: 2 Glasses of wine per week     Comment: social    Drug use: Yes     Frequency: 2.0 times per week     Types: Marijuana     Comment: medical edible marijuana prescribed        Jose Rangel MD  05/15/25 [id number]

## 2025-05-15 NOTE — ED NOTES
This writer accessed patient existing Port A Cath at documented time with a 20G 0.75in power port. Dressing clean dry and intact. Flushed, bloodwork obtained, flushed and curio cap applied.      Lisa Stone RN  05/15/25 0812

## 2025-05-15 NOTE — ED NOTES
This RN spoke with Kalen at [facility] regarding patient's status, [facility] can be reached at [phone]     Raymon Willams RN  05/15/25 [id number]

## 2025-05-15 NOTE — TELEPHONE ENCOUNTER
Patient is calling regarding cancelling an appointment.    Date/Time: 5/15 9 am    Patient was rescheduled: YES [] NO [x]    Patient requesting call back to reschedule: YES [x] NO []

## 2025-05-15 NOTE — CONSULTS
Consultation - Pulmonology   Name: Maximilian Mann 49 y.o. female I MRN: [id number]  Unit/Bed#: ED 29 I Date of Admission: 5/15/2025   Date of Service: 5/15/2025 I Hospital Day: 0   Inpatient consult to Pulmonology  Consult performed by: Yaima Benitez MD  Consult ordered by: Emilie Soyeon Kim, MD        Physician Requesting Evaluation: Emilie Soyeon Kim, MD   Reason for Evaluation / Principal Problem: Shortness of breath    Assessment & Plan  Shortness of breath  Shortness of breath worsening in the past few months with recent COVID-19 pneumonia in January 2025 after which she states her FEV1 which was around 40% in January 2025 came down to 15%  Rule out bronchiolitis/bronchopneumonia in the left lung infiltrates also given lactic acidosis  Antibiotics on linezolid and Zosyn with prior microbiology cultures after discussion with transplant center by the ED team  She was on 15 mg of prednisone on maintenance dose, she has increased it to 20 mg in the past week to 10 days given her shortness of breath yesterday transplant team had asked her to be on 40 mg will recommend to be on the 40 mg at least for the next 5 days until she is being transferred to the [facility], and it can be tapered down as able to her baseline whether it is 15 or 20 mg as able by the transplant team  Continue with her nebulizer treatments around-the-clock along with the Symbicort  S/P pneumonectomy  Status post right pneumonectomy in October 2021  Chronic rejection of lung transplant (HCC)  Chronic rejection of the left lung transplant with significant decrease in FEV1, being followed up with transplant center  Has been on tacrolimus consistently taking the medication along with atovaquone and azithromycin  Lactic acidosis  Mildly elevated lactic acidosis, ?  Bronchopneumonia  Bronchopneumonia  Diffuse bronchiolitis on the left lung, even though her white cell count is normal and given her immune suppression and the lactic acidosis  were not treat her with the antibiotics  I have discussed the above management plan in detail with the primary service.     History of Present Illness   Maximilian Mann is a 49 y.o. 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 in 2022 and 2023 with tree-in-bud nodularity in the left lung compatible with bronchiolitis  States she had COVID-19 pneumonia in January 2025 and she was there at [facility] for about a month and since then she states has been having difficulty with shortness of breath mood worsening symptoms of shortness of breath and and has not gone back to her baseline she states.  Has been having difficulty even doing small chores at home which she was able to a few months ago she states.  Even small exertion is causing her shortness of breath  She also states that 8 to 9 months ago she was also told about rejection in the left lung as well  She did not have any recent fevers but worsening shortness of breath for which she did come into the [facility] about 4 to 5 days ago on 5/10 she was seen in the ER and was sent home, but she states the shortness of breath got worse and her oxygen saturations have been dropping down into the low 80s when she is exerting she did call her transplant coordinator who recommended her to go back into the ER  Also discussed to take prednisone 40 mg she is usually on a 15 mg maintenance dose, although in the past 2 weeks she states has been taking around 20 mg    Review of Systems   Constitutional:  Positive for fatigue.   HENT: Negative.     Eyes: Negative.    Respiratory:  Positive for shortness of breath.    Cardiovascular: Negative.     Gastrointestinal: Negative.    Endocrine: Negative.    Genitourinary: Negative.    Musculoskeletal: Negative.    Allergic/Immunologic: Negative.    Neurological: Negative.    Psychiatric/Behavioral: Negative.         Historical Information   Historical Information   Past Medical History:   Diagnosis Date    Abnormal uterine bleeding (AUB) 7/7/2022    ABPA (allergic bronchopulmonary aspergillosis) ([facility]) 07/08/2012    Acute kidney injury (HCC) 09/08/2017    Acute on chronic respiratory failure ([facility]) 03/04/2017    Last Assessment & Plan:  Formatting of this note might be different from the original. Recurrent with clinical worsening with enlarging PTX, s/p multiple chest tubes, but new fevers 6/19 and worsening leukocytosis indicating infection as cause of 6/19 decompensation with profound hypoxia in setting of shunting through right lung and continued pneumothorax s/p 2nd chest tube placement.  6/27: Right    Anaphylaxis 06/06/2022    Anemia     Asthma     Chest pain 09/21/2021    Chronic pain     Chronic respiratory failure with hypoxia ([facility]) 09/07/2021    COVID-19 06/03/2022    Cystic fibrosis ([facility])     Deep vein thrombosis ([facility])     Diabetes mellitus ([facility])     Fever 09/22/2024    GERD (gastroesophageal reflux disease)     HBP (high blood pressure)     HCAP (healthcare-associated pneumonia) 03/06/2020    History of transfusion     Influenza B 02/23/2020    Kidney stone     Moderate protein-calorie malnutrition ([facility]) 01/08/2022    Neutropenic fever  ([facility]) 09/26/2020    Pneumonia due to Pseudomonas species ([facility]) 10/28/2010    Last Assessment & Plan:  Formatting of this note might be different from the original. Polymicrobial pna w/ pseudomonas, M. Abscessus, Cadida glabrata/albicans    PONV (postoperative nausea and vomiting)     Pseudomonas aeruginosa infection 09/20/2012    Renal calculus     Squamous cell skin cancer 01/12/2023    SCCIS- Left upper thigh    Transplant recipient     Lung- 2013, 2017    Ulcerative  colitis (HCC)     Wellness examination 02/04/2013    Formatting of this note might be different from the original. Discussed with patient and acknowledgement form signed on 2/4/2013 (ALYSHA)     Past Surgical History:   Procedure Laterality Date    APPENDECTOMY      AUGMENTATION MAMMAPLASTY Bilateral 2004    AUGMENTATION MAMMAPLASTY Right 2021    removed     BREAST SURGERY      Aug.     BRONCHOSCOPY      CHOLECYSTECTOMY      COLONOSCOPY      EGD  07/2019    [facility].     FL RETROGRADE PYELOGRAM  01/09/2020    FL RETROGRADE PYELOGRAM  02/04/2020    FL RETROGRADE PYELOGRAM  9/20/2024    GASTROSCOPY  03/01/2024    GASTROSTOMY TUBE, PLACE      IR BALLOON-OCCLUDED ANTEGRADE TRANSVENOUS OBLITERATION (BATO)  11/22/2022    IR PORT PLACEMENT  03/22/2022    IR PORT REMOVAL  01/10/2022    IR PORT STRIPPING  11/25/2022    IR TUNNELED CENTRAL LINE PLACEMENT  01/13/2022    LUNG SURGERY      LUNG TRANSPLANT      X2    NC CYSTO/URETERO W/LITHOTRIPSY &INDWELL STENT INSRT Right 02/04/2020    Procedure: CYSTOSCOPY URETEROSCOPY WITH LITHOTRIPSY HOLMIUM LASER, RETROGRADE PYELOGRAM AND INSERTION STENT URETERAL;  Surgeon: Patricio Perry MD;  Location: [facility];  Service: Urology    NC CYSTO/URETERO W/LITHOTRIPSY &INDWELL STENT INSRT Left 9/20/2024    Procedure: CYSTOSCOPY URETEROSCOPY WITH LITHOTRIPSY HOLMIUM LASER, RETROGRADE PYELOGRAM AND INSERTION STENT URETERAL, BASKET STONE EXTRACTION;  Surgeon: Lee Briceno MD;  Location: [facility];  Service: Urology    NC CYSTOURETHROSCOPY W/URETERAL CATHETERIZATION Right 01/09/2020    Procedure: CYSTOSCOPY RETROGRADE PYELOGRAM WITH INSERTION STENT URETERAL;  Surgeon: Patricio Perry MD;  Location: [facility];  Service: Urology    SKIN BIOPSY      US GUIDED VASCULAR ACCESS  01/17/2017     Social History     Tobacco Use    Smoking status: Former     Current packs/day: 0.00     Average packs/day: 0.5 packs/day for 12.0 years (6.0 ttl pk-yrs)     Types: Cigarettes     Start date:  1993     Quit date: 2005     Years since quittin.3     Passive exposure: Past    Smokeless tobacco: Never   Vaping Use    Vaping status: Never Used   Substance and Sexual Activity    Alcohol use: Yes     Alcohol/week: 2.0 standard drinks of alcohol     Types: 2 Glasses of wine per week     Comment: social    Drug use: Yes     Frequency: 2.0 times per week     Types: Marijuana     Comment: medical edible marijuana prescribed    Sexual activity: Yes     Partners: Male     Birth control/protection: None     E-Cigarette/Vaping    E-Cigarette Use Never User      E-Cigarette/Vaping Substances    Nicotine No     THC No     CBD No     Flavoring No     Other No     Unknown No      Family history non-contributory  Tobacco History: Never smoked  Occupational History: Currently not working  Family History:non-contributory    Objective :  Temp:  [98.6 °F (37 °C)] 98.6 °F (37 °C)  HR:  [72-87] 81  BP: (135-187)/() 142/90  Resp:  [18-47] 18  SpO2:  [88 %-97 %] 88 %  O2 Device: None (Room air)    Physical Exam  Currently in bed in no acute respiratory distress  Eyes anicteric Sleeter, conjunctiva is clear  ENT nares is patent, no evidence of any discharge  Neck no JVD no cervical lymphadenopathy  Lungs diminished breath sounds bilaterally no rhonchi  Heart 1st and 2nd heart sounds are heard no murmur or gallop is heard  Abdomen soft nontender bowel sounds are present  Extremities no pedal edema  CNS awake alert oriented x 3    Lab Results: I have reviewed the following results:  .     05/15/25  0820 05/15/25  1106   WBC 6.13  --    HGB 11.9  --    HCT 39.3  --    *  --    SODIUM 135  --    K 4.1  --      --    CO2 22  --    BUN 29*  --    CREATININE 1.16  --    GLUC 330*  --    AST 14  --    ALT 21  --    ALB 3.8  --    TBILI 0.30  --    ALKPHOS 51  --    PTT 26  --    INR 1.03  --    HSTNI0 23  --    *  --    LACTICACID 3.3* 2.3*     ABG: No new results in last 24 hours.    Imaging Results  Review: I personally reviewed the following image studies in PACS and associated radiology reports: CT chest. My interpretation of the radiology images/reports is: No evidence of pulmonary embolism status post right pneumonectomy and left lung transplant mild diffuse bronchiolitis in the left lung.  Other Study Results Review: No additional pertinent studies reviewed.  PFT Results Reviewed: reviewed, January 2025 with FEV1 of 48%, FEV1/FVC of 4.0    VTE Prophylaxis: Sequential compression device (Venodyne)

## 2025-05-16 LAB
ANION GAP SERPL CALCULATED.3IONS-SCNC: 7 MMOL/L (ref 4–13)
ATRIAL RATE: 79 BPM
B PARAP IS1001 DNA NPH QL NAA+NON-PROBE: NOT DETECTED
B PERT.PT PRMT NPH QL NAA+NON-PROBE: NOT DETECTED
BASOPHILS # BLD AUTO: 0.01 THOUSANDS/ÂΜL (ref 0–0.1)
BASOPHILS NFR BLD AUTO: 0 % (ref 0–1)
BUN SERPL-MCNC: 23 MG/DL (ref 5–25)
C PNEUM DNA NPH QL NAA+NON-PROBE: NOT DETECTED
CALCIUM SERPL-MCNC: 8.2 MG/DL (ref 8.4–10.2)
CHLORIDE SERPL-SCNC: 103 MMOL/L (ref 96–108)
CO2 SERPL-SCNC: 26 MMOL/L (ref 21–32)
CREAT SERPL-MCNC: 1.23 MG/DL (ref 0.6–1.3)
EOSINOPHIL # BLD AUTO: 0.01 THOUSAND/ÂΜL (ref 0–0.61)
EOSINOPHIL NFR BLD AUTO: 0 % (ref 0–6)
ERYTHROCYTE [DISTWIDTH] IN BLOOD BY AUTOMATED COUNT: 15.9 % (ref 11.6–15.1)
FLUAV RNA NPH QL NAA+NON-PROBE: NOT DETECTED
FLUBV RNA NPH QL NAA+NON-PROBE: NOT DETECTED
GFR SERPL CREATININE-BSD FRML MDRD: 52 ML/MIN/1.73SQ M
GLUCOSE SERPL-MCNC: 156 MG/DL (ref 65–140)
GLUCOSE SERPL-MCNC: 157 MG/DL (ref 65–140)
GLUCOSE SERPL-MCNC: 198 MG/DL (ref 65–140)
GLUCOSE SERPL-MCNC: 206 MG/DL (ref 65–140)
GLUCOSE SERPL-MCNC: 223 MG/DL (ref 65–140)
GLUCOSE SERPL-MCNC: 230 MG/DL (ref 65–140)
HADV DNA NPH QL NAA+NON-PROBE: NOT DETECTED
HCOV 229E RNA NPH QL NAA+NON-PROBE: NOT DETECTED
HCOV HKU1 RNA NPH QL NAA+NON-PROBE: NOT DETECTED
HCOV NL63 RNA NPH QL NAA+NON-PROBE: NOT DETECTED
HCOV OC43 RNA NPH QL NAA+NON-PROBE: NOT DETECTED
HCT VFR BLD AUTO: 35.5 % (ref 34.8–46.1)
HGB BLD-MCNC: 11.2 G/DL (ref 11.5–15.4)
HMPV RNA NPH QL NAA+NON-PROBE: NOT DETECTED
HPIV1 RNA NPH QL NAA+NON-PROBE: NOT DETECTED
HPIV2 RNA NPH QL NAA+NON-PROBE: NOT DETECTED
HPIV3 RNA NPH QL NAA+NON-PROBE: NOT DETECTED
HPIV4 RNA NPH QL NAA+NON-PROBE: NOT DETECTED
IMM GRANULOCYTES # BLD AUTO: 0.03 THOUSAND/UL (ref 0–0.2)
IMM GRANULOCYTES NFR BLD AUTO: 1 % (ref 0–2)
LYMPHOCYTES # BLD AUTO: 1.16 THOUSANDS/ÂΜL (ref 0.6–4.47)
LYMPHOCYTES NFR BLD AUTO: 21 % (ref 14–44)
M PNEUMO DNA NPH QL NAA+NON-PROBE: NOT DETECTED
MCH RBC QN AUTO: 29.9 PG (ref 26.8–34.3)
MCHC RBC AUTO-ENTMCNC: 31.5 G/DL (ref 31.4–37.4)
MCV RBC AUTO: 95 FL (ref 82–98)
MONOCYTES # BLD AUTO: 0.41 THOUSAND/ÂΜL (ref 0.17–1.22)
MONOCYTES NFR BLD AUTO: 8 % (ref 4–12)
NEUTROPHILS # BLD AUTO: 3.8 THOUSANDS/ÂΜL (ref 1.85–7.62)
NEUTS SEG NFR BLD AUTO: 70 % (ref 43–75)
NRBC BLD AUTO-RTO: 0 /100 WBCS
P AXIS: 71 DEGREES
PLATELET # BLD AUTO: 128 THOUSANDS/UL (ref 149–390)
PMV BLD AUTO: 9.4 FL (ref 8.9–12.7)
POTASSIUM SERPL-SCNC: 4.2 MMOL/L (ref 3.5–5.3)
PR INTERVAL: 132 MS
QRS AXIS: 55 DEGREES
QRSD INTERVAL: 78 MS
QT INTERVAL: 384 MS
QTC INTERVAL: 440 MS
RBC # BLD AUTO: 3.75 MILLION/UL (ref 3.81–5.12)
RSV RNA NPH QL NAA+NON-PROBE: NOT DETECTED
RV+EV RNA NPH QL NAA+NON-PROBE: NOT DETECTED
SARS-COV-2 RNA NPH QL NAA+NON-PROBE: NOT DETECTED
SODIUM SERPL-SCNC: 136 MMOL/L (ref 135–147)
T WAVE AXIS: 157 DEGREES
VENTRICULAR RATE: 79 BPM
WBC # BLD AUTO: 5.42 THOUSAND/UL (ref 4.31–10.16)

## 2025-05-16 PROCEDURE — 94664 DEMO&/EVAL PT USE INHALER: CPT

## 2025-05-16 PROCEDURE — 94760 N-INVAS EAR/PLS OXIMETRY 1: CPT

## 2025-05-16 PROCEDURE — 80048 BASIC METABOLIC PNL TOTAL CA: CPT

## 2025-05-16 PROCEDURE — 99232 SBSQ HOSP IP/OBS MODERATE 35: CPT | Performed by: HOSPITALIST

## 2025-05-16 PROCEDURE — 94640 AIRWAY INHALATION TREATMENT: CPT

## 2025-05-16 PROCEDURE — 82948 REAGENT STRIP/BLOOD GLUCOSE: CPT

## 2025-05-16 PROCEDURE — 85025 COMPLETE CBC W/AUTO DIFF WBC: CPT

## 2025-05-16 PROCEDURE — 93010 ELECTROCARDIOGRAM REPORT: CPT | Performed by: INTERNAL MEDICINE

## 2025-05-16 RX ORDER — DEXLANSOPRAZOLE 60 MG/1
60 CAPSULE, DELAYED RELEASE ORAL
Status: DISCONTINUED | OUTPATIENT
Start: 2025-05-16 | End: 2025-05-18 | Stop reason: HOSPADM

## 2025-05-16 RX ADMIN — Medication 5000 UNITS: at 08:15

## 2025-05-16 RX ADMIN — POLYETHYLENE GLYCOL 3350 17 G: 17 POWDER, FOR SOLUTION ORAL at 17:00

## 2025-05-16 RX ADMIN — OXYCODONE HYDROCHLORIDE 10 MG: 10 TABLET ORAL at 20:08

## 2025-05-16 RX ADMIN — INSULIN LISPRO 8 UNITS: 100 INJECTION, SOLUTION INTRAVENOUS; SUBCUTANEOUS at 08:25

## 2025-05-16 RX ADMIN — SODIUM CHLORIDE SOLN NEBU 3% 4 ML: 3 NEBU SOLN at 07:26

## 2025-05-16 RX ADMIN — ALBUTEROL SULFATE 5 MG: 2.5 SOLUTION RESPIRATORY (INHALATION) at 19:00

## 2025-05-16 RX ADMIN — INSULIN LISPRO 8 UNITS: 100 INJECTION, SOLUTION INTRAVENOUS; SUBCUTANEOUS at 13:38

## 2025-05-16 RX ADMIN — POLYETHYLENE GLYCOL 3350 17 G: 17 POWDER, FOR SOLUTION ORAL at 08:18

## 2025-05-16 RX ADMIN — TACROLIMUS 3 MG: 1 CAPSULE ORAL at 08:20

## 2025-05-16 RX ADMIN — HYDROMORPHONE HYDROCHLORIDE 0.5 MG: 1 INJECTION, SOLUTION INTRAMUSCULAR; INTRAVENOUS; SUBCUTANEOUS at 22:36

## 2025-05-16 RX ADMIN — INSULIN LISPRO 1 UNITS: 100 INJECTION, SOLUTION INTRAVENOUS; SUBCUTANEOUS at 08:25

## 2025-05-16 RX ADMIN — Medication 500 MG: at 08:14

## 2025-05-16 RX ADMIN — TACROLIMUS 3 MG: 1 CAPSULE ORAL at 17:00

## 2025-05-16 RX ADMIN — ATOVAQUONE 1500 MG: 750 SUSPENSION ORAL at 08:18

## 2025-05-16 RX ADMIN — HYDROMORPHONE HYDROCHLORIDE 0.5 MG: 1 INJECTION, SOLUTION INTRAMUSCULAR; INTRAVENOUS; SUBCUTANEOUS at 05:37

## 2025-05-16 RX ADMIN — BUDESONIDE AND FORMOTEROL FUMARATE DIHYDRATE 2 PUFF: 160; 4.5 AEROSOL RESPIRATORY (INHALATION) at 13:37

## 2025-05-16 RX ADMIN — Medication 500 MG: at 17:00

## 2025-05-16 RX ADMIN — HYDROMORPHONE HYDROCHLORIDE 0.5 MG: 1 INJECTION, SOLUTION INTRAMUSCULAR; INTRAVENOUS; SUBCUTANEOUS at 18:24

## 2025-05-16 RX ADMIN — INSULIN LISPRO 8 UNITS: 100 INJECTION, SOLUTION INTRAVENOUS; SUBCUTANEOUS at 17:02

## 2025-05-16 RX ADMIN — PANCRELIPASE 48000 UNITS: 120000; 24000; 76000 CAPSULE, DELAYED RELEASE PELLETS ORAL at 08:03

## 2025-05-16 RX ADMIN — ACETAMINOPHEN 975 MG: 325 TABLET ORAL at 13:37

## 2025-05-16 RX ADMIN — OXYCODONE HYDROCHLORIDE 10 MG: 10 TABLET ORAL at 03:07

## 2025-05-16 RX ADMIN — PANCRELIPASE 48000 UNITS: 120000; 24000; 76000 CAPSULE, DELAYED RELEASE PELLETS ORAL at 13:37

## 2025-05-16 RX ADMIN — Medication 4.5 G: at 00:25

## 2025-05-16 RX ADMIN — TRETINOIN 1 APPLICATION: 0.25 CREAM TOPICAL at 21:15

## 2025-05-16 RX ADMIN — AZITHROMYCIN 250 MG: 250 TABLET, FILM COATED ORAL at 08:15

## 2025-05-16 RX ADMIN — OXYCODONE HYDROCHLORIDE AND ACETAMINOPHEN 500 MG: 500 TABLET ORAL at 08:14

## 2025-05-16 RX ADMIN — VALACYCLOVIR HYDROCHLORIDE 500 MG: 500 TABLET, FILM COATED ORAL at 08:19

## 2025-05-16 RX ADMIN — CYANOCOBALAMIN TAB 500 MCG 1000 MCG: 500 TAB at 08:17

## 2025-05-16 RX ADMIN — ALBUTEROL SULFATE 5 MG: 2.5 SOLUTION RESPIRATORY (INHALATION) at 07:26

## 2025-05-16 RX ADMIN — FERROUS SULFATE TAB 325 MG (65 MG ELEMENTAL FE) 325 MG: 325 (65 FE) TAB at 08:14

## 2025-05-16 RX ADMIN — ONDANSETRON 4 MG: 2 INJECTION INTRAMUSCULAR; INTRAVENOUS at 18:24

## 2025-05-16 RX ADMIN — OXYCODONE HYDROCHLORIDE 10 MG: 10 TABLET ORAL at 09:48

## 2025-05-16 RX ADMIN — INSULIN GLARGINE 10 UNITS: 100 INJECTION, SOLUTION SUBCUTANEOUS at 08:08

## 2025-05-16 RX ADMIN — PREDNISONE 40 MG: 20 TABLET ORAL at 08:15

## 2025-05-16 RX ADMIN — APIXABAN 5 MG: 5 TABLET, FILM COATED ORAL at 08:12

## 2025-05-16 RX ADMIN — LINEZOLID 600 MG: 600 INJECTION, SOLUTION INTRAVENOUS at 10:39

## 2025-05-16 RX ADMIN — ESCITALOPRAM OXALATE 5 MG: 10 TABLET ORAL at 08:13

## 2025-05-16 RX ADMIN — BUDESONIDE AND FORMOTEROL FUMARATE DIHYDRATE 2 PUFF: 160; 4.5 AEROSOL RESPIRATORY (INHALATION) at 00:18

## 2025-05-16 RX ADMIN — Medication 4.5 G: at 09:50

## 2025-05-16 RX ADMIN — ACETAMINOPHEN 975 MG: 325 TABLET ORAL at 21:12

## 2025-05-16 RX ADMIN — FAMOTIDINE 40 MG: 20 TABLET, FILM COATED ORAL at 21:13

## 2025-05-16 RX ADMIN — HYDROMORPHONE HYDROCHLORIDE 0.5 MG: 1 INJECTION, SOLUTION INTRAMUSCULAR; INTRAVENOUS; SUBCUTANEOUS at 13:36

## 2025-05-16 RX ADMIN — ONDANSETRON 4 MG: 2 INJECTION INTRAMUSCULAR; INTRAVENOUS at 14:27

## 2025-05-16 RX ADMIN — ACETAMINOPHEN 975 MG: 325 TABLET ORAL at 05:37

## 2025-05-16 RX ADMIN — INSULIN LISPRO 1 UNITS: 100 INJECTION, SOLUTION INTRAVENOUS; SUBCUTANEOUS at 17:02

## 2025-05-16 RX ADMIN — ALPRAZOLAM 1 MG: 0.5 TABLET ORAL at 11:08

## 2025-05-16 RX ADMIN — ONDANSETRON 4 MG: 2 INJECTION INTRAMUSCULAR; INTRAVENOUS at 22:59

## 2025-05-16 RX ADMIN — DEXLANSOPRAZOLE 60 MG: 60 CAPSULE, DELAYED RELEASE ORAL at 21:14

## 2025-05-16 RX ADMIN — APIXABAN 5 MG: 5 TABLET, FILM COATED ORAL at 17:00

## 2025-05-16 RX ADMIN — PANCRELIPASE 48000 UNITS: 120000; 24000; 76000 CAPSULE, DELAYED RELEASE PELLETS ORAL at 17:02

## 2025-05-16 RX ADMIN — INSULIN LISPRO 1 UNITS: 100 INJECTION, SOLUTION INTRAVENOUS; SUBCUTANEOUS at 13:38

## 2025-05-16 RX ADMIN — OXYBUTYNIN CHLORIDE 15 MG: 5 TABLET, EXTENDED RELEASE ORAL at 21:13

## 2025-05-16 RX ADMIN — Medication 4.5 G: at 18:24

## 2025-05-16 RX ADMIN — LINEZOLID 600 MG: 600 INJECTION, SOLUTION INTRAVENOUS at 21:19

## 2025-05-16 RX ADMIN — ALPRAZOLAM 1 MG: 0.5 TABLET ORAL at 23:42

## 2025-05-16 NOTE — PLAN OF CARE
Problem: PAIN - ADULT  Goal: Verbalizes/displays adequate comfort level or baseline comfort level  Description: Interventions:  - Encourage patient to monitor pain and request assistance  - Assess pain using appropriate pain scale  - Administer analgesics as ordered based on type and severity of pain and evaluate response  - Implement non-pharmacological measures as appropriate and evaluate response  - Consider cultural and social influences on pain and pain management  - Notify physician/advanced practitioner if interventions unsuccessful or patient reports new pain  - Educate patient/family on pain management process including their role and importance of  reporting pain   - Provide non-pharmacologic/complimentary pain relief interventions  Outcome: Progressing     Problem: INFECTION - ADULT  Goal: Absence or prevention of progression during hospitalization  Description: INTERVENTIONS:  - Assess and monitor for signs and symptoms of infection  - Monitor lab/diagnostic results  - Monitor all insertion sites, i.e. indwelling lines, tubes, and drains  - Monitor endotracheal if appropriate and nasal secretions for changes in amount and color  - [place] appropriate cooling/warming therapies per order  - Administer medications as ordered  - Instruct and encourage patient and family to use good hand hygiene technique  - Identify and instruct in appropriate isolation precautions for identified infection/condition  Outcome: Progressing  Goal: Absence of fever/infection during neutropenic period  Description: INTERVENTIONS:  - Monitor WBC  - Perform strict hand hygiene  - Limit to healthy visitors only  - No plants, dried, fresh or silk flowers with bruce in patient room  Outcome: Progressing     Problem: DISCHARGE PLANNING  Goal: Discharge to home or other facility with appropriate resources  Description: INTERVENTIONS:  - Identify barriers to discharge w/patient and caregiver  - Arrange for needed discharge resources  and transportation as appropriate  - Identify discharge learning needs (meds, wound care, etc.)  - Arrange for interpretive services to assist at discharge as needed  - Refer to Case Management Department for coordinating discharge planning if the patient needs post-hospital services based on physician/advanced practitioner order or complex needs related to functional status, cognitive ability, or social support system  Outcome: Progressing

## 2025-05-16 NOTE — PROGRESS NOTES
Progress Note - Hospitalist   Name: Maximilian Mann 49 y.o. female I MRN: [id number]  Unit/Bed#: -01 I Date of Admission: 5/15/2025   Date of Service: 5/16/2025 I Hospital Day: 1     Assessment & Plan  Type 1 diabetes mellitus with diabetic chronic kidney disease, unspecified CKD stage (Formerly [facility])  Lab Results   Component Value Date    HGBA1C 9.2 (H) 05/15/2025       Recent Labs     05/15/25  2132 05/16/25  0716 05/16/25  0807 05/16/25  1110   POCGLU 312* 156* 157* 198*       Blood Sugar Average: Last 72 hrs:  (P) [id number]  Patient has cystic fibrosis associated with diabetes.  On long-acting insulin 10 units daily and aspart sliding scale 3 times daily with meals (approximately 10 units breakfast 8 units lunch and 12 units dinner).    - Lantus 10 units every morning  - lispro 8 units 3 times daily with meals plus sliding scale insulin  - Check blood glucose 3 times daily prior to meals  -Monitor for hyperglycemia in the setting of steroid use, currently increased to 40 mg per transplant doctor at \[facility]\"" recommendation  S/P pneumonectomy  Status post right-sided pneumonectomy in 2021  Pancreatic insufficiency  Continue on Creon 33405 units 3 times daily with meals  Asthma  No wheezing on exam today  Continue daily Symbicort  Albuterol inhalation twice daily  Sodium chloride 3% inhalations twice daily for asthma and history of CF per respiratory  Chronic pain disorder  Patient takes oxycodone IR 10 mg every 4 hours as needed outpatient  Will continue current regimen orally  Add breakthrough with 0.5 mg IV Dilaudid every 2 hours as needed  DVT (deep venous thrombosis) (Formerly [facility])  History of DVT PE on Eliquis  Continue Eliquis  Anxiety  Ativan 1 mg twice daily as needed  Continue Lexapro  Immunosuppression (Formerly [facility])  Patient with systemic immunosuppression on tacrolimus and prednisone  Continue prophylaxis with valacyclovir 500 daily, azithromycin 250 mg 3 times weekly on Monday Wednesday Friday  Atovaquone 1500  mg daily  Check tacrolimus level  Stage 3a chronic kidney disease (HCC)  Lab Results   Component Value Date    EGFR 52 05/16/2025    EGFR 56 05/15/2025    EGFR 42 05/10/2025    CREATININE 1.23 05/16/2025    CREATININE 1.16 05/15/2025    CREATININE 1.46 (H) 05/10/2025   Baseline appears to be 1-1.3  Currently at baseline  Monitor BMP daily   Chronic rejection of lung transplant (HCC)  On tacrolimus 3 mg twice daily  Following [facility] transplant  On chronic prednisone 15 mg daily, recently increased to 40 mg daily per transplant DrMumtaz In setting of increased dyspnea  Shortness of breath  Patient with lung transplant with chronic rejection on tacrolimus and prednisone  Patient was instructed to increase prednisone from 15 mg to 40 mg with taper per [facility] transplant doctor  WBC 6.13K, VBG 7.326/36/39/18.4, , Lactate 3.3 improved to 2.3 s/p fluids, flu/covid rsv negative  CTA chest PE this admission shows mild diffuse bronchiolitis without new findings similar to prior imaging.  Status post right pneumonectomy and left lung transplant.  No signs of PE or overt pneumonia  History of MRSA and Pseudomonas pneumonia in January 2025 treated at [facility]  Treat with linezolid 600 mg IV every 12 hours and pip-tazo extended infusion 4.5 g IV every 8 hours per protocol for suspected pneumonia (discussed with Dr. Gianna Crews from pharmacy)  Pulmonology consulted for comanagement  Continue on tacrolimus 3 mg twice daily  Started on prednisone 40 mg daily with plans for taper  Follow-up blood cultures; Rp2 panel leg  S/p 1 dose lasix 40 mg PO (patient takes PRN) in setting of elevated BNP and dyspnea  Transfer ADT placed through PACS for transfer to [facility], Dr. Wilburn accepting physician at [facility] when bed available.  Lactic acidosis  Blood pressure 130s over 80s at bedside at this time  Elevated lactate 3.3 improved to 2.3 status post 1 L bolus of fluids  Not meeting sepsis criteria at this time  Bronchopneumonia     VTE   Prophylaxis:   Pharmacologic: in place  Mechanical VTE Prophylaxis in Place: Yes    Patient Centered Rounds: I have performed bedside rounds with nursing staff today.    Discussions with Specialists or Other Care Team Provider: case management    Education and Discussions with Family / Patient: yes    Mobility:   Basic Mobility Inpatient Raw Score: 22  -HLM Goal: 7: Walk 25 feet or more  JH-HLM Achieved: 6: Walk 10 steps or more (SOB with exertion)      Current Length of Stay: 1 day(s)    Current Patient Status: Inpatient        Code Status: Level 1 - Full Code    Discharge Plan: Pt will require continued inpatient hospitalization.    Subjective:   Pt states she feels about the same  No acute chest pain  Still resp status not at baseline    Patient is seen and examined at bedside.  All other ROS are negative.    Objective:     Vitals:   Temp (24hrs), Av.3 °F (36.8 °C), Min:97.9 °F (36.6 °C), Max:98.6 °F (37 °C)    Temp:  [97.9 °F (36.6 °C)-98.6 °F (37 °C)] 97.9 °F (36.6 °C)  HR:  [58-94] 80  Resp:  [18-24] 20  BP: (117-148)/(73-92) 117/73  SpO2:  [93 %-100 %] 97 %  Body mass index is 20.6 kg/m².     Input and Output Summary (last 24 hours):       Intake/Output Summary (Last 24 hours) at 2025 1518  Last data filed at 2025 1301  Gross per 24 hour   Intake 945.83 ml   Output 1310 ml   Net -364.17 ml       Physical Exam:       GEN: No acute distress, comfortable  HEEENT: No JVD, PERRLA, no scleral icterus  RESP: Lungs clear to auscultation bilaterally  CV: RRR, +s1/s2   ABD: SOFT NON TENDER, POSITIVE BOWEL SOUNDS, NO DISTENTION  PSYCH: CALM  NEURO: Mentation baseline, NO FOCAL DEFICITS  SKIN: NO RASH  EXTREM: NO EDEMA    Additional Data:     Labs:    Results from last 7 days   Lab Units 25  0438   WBC Thousand/uL 5.42   HEMOGLOBIN g/dL 11.2*   HEMATOCRIT % 35.5   PLATELETS Thousands/uL 128*   SEGS PCT % 70   LYMPHO PCT % 21   MONO PCT % 8   EOS PCT % 0     Results from last 7 days   Lab Units  05/16/25  0438 05/15/25  0820   SODIUM mmol/L 136 135   POTASSIUM mmol/L 4.2 4.1   CHLORIDE mmol/L 103 104   CO2 mmol/L 26 22   BUN mg/dL 23 29*   CREATININE mg/dL 1.23 1.16   ANION GAP mmol/L 7 9   CALCIUM mg/dL 8.2* 8.8   ALBUMIN g/dL  --  3.8   TOTAL BILIRUBIN mg/dL  --  0.30   ALK PHOS U/L  --  51   ALT U/L  --  21   AST U/L  --  14   GLUCOSE RANDOM mg/dL 230* 330*     Results from last 7 days   Lab Units 05/15/25  0820   INR  1.03     Results from last 7 days   Lab Units 05/16/25  1110 05/16/25  0807 05/16/25  0716 05/15/25  2132 05/15/25  1832 05/15/25  1352   POC GLUCOSE mg/dl 198* 157* 156* 312* 349* 173*     Results from last 7 days   Lab Units 05/15/25  0820   HEMOGLOBIN A1C % 9.2*     Results from last 7 days   Lab Units 05/15/25  1106 05/15/25  0820 05/10/25  2206   LACTIC ACID mmol/L 2.3* 3.3* 1.3   PROCALCITONIN ng/ml  --  <0.05 <0.05       Lines/Drains:  Invasive Devices       Central Venous Catheter Line  Duration             Port A Cath 03/22/22 Right Subclavian 1151 days                    Telemetry:        * I Have Reviewed All Lab Data Listed Above.           Imaging:     Results for orders placed during the hospital encounter of 05/15/25    XR chest 1 view portable    Narrative  XR CHEST PORTABLE    INDICATION: SOB, h/o pneumonectomy.    COMPARISON: Chest radiograph 5/10/2025, subsequently acquired CT PE    FINDINGS: Right chest wall port with tip in the right mediastinum.  Exact location is unclear however due to distorted anatomy.    Right pneumonectomy, with right-sided mediastinal shift and obscured cardiomediastinal silhouette. Left lung clear. No left-sided pneumothorax or pleural effusion.    Median sternotomy.    Right upper quadrant surgical clips.    Impression  No significant interval change. No left-sided consolidation, pleural effusion, or pneumothorax.        Resident: Kermit Hanna I, the attending radiologist, have reviewed the images and agree with the final report  above.    Workstation performed: [id number]    Results for orders placed during the hospital encounter of 05/10/25    XR chest 2 views    Narrative  XR CHEST PA AND LATERAL    INDICATION: SOB, L upper back pain.    COMPARISON: CXR 5/26/2024, subsequent chest CT 5/10/2025.    FINDINGS: Right port.    Right pneumonectomy. Hyperinflation of the left lung. Left lung clear. Left breast implant better shown on CT. No pneumothorax or pleural effusion.    Normal cardiomediastinal silhouette.    Bones are unremarkable for age. Median sternotomy.    Upper abdomen normal. Cholecystectomy.    Impression  No acute cardiopulmonary disease.    Right pneumonectomy with hyperinflated left lung.        Workstation performed: [id number]      *I have reviewed all imaging reports listed above      Recent Cultures (last 7 days):     Results from last 7 days   Lab Units 05/15/25  1008 05/15/25  0857   BLOOD CULTURE  Received in Microbiology Lab. Culture in Progress. No Growth at 24 hrs.       Last 24 Hours Medication List:   Current Facility-Administered Medications   Medication Dose Route Frequency Provider Last Rate    acetaminophen  975 mg Oral Q8H [facility] Fernando Light MD      albuterol  2 puff Inhalation Q4H PRN Fernando Light MD      albuterol  5 mg Nebulization BID Fernando Light MD      ALPRAZolam  1 mg Oral BID PRN Fernando Light MD      apixaban  5 mg Oral BID Fernando Light MD      vitamin C  500 mg Oral Daily Fernando Light MD      atovaquone  1,500 mg Oral Daily Fernando Light MD      azithromycin  250 mg Oral Once per day on Monday Wednesday Friday Fernando Light MD      bisacodyl  5 mg Oral HS Fernando Light MD      budesonide-formoterol  2 puff Inhalation Q12H Fernando Light MD      Cholecalciferol  5,000 Units Oral Daily Fernando Light MD      vitamin B-12  1,000 mcg Oral Daily Fernando Light MD      dexlansoprazole  60 mg Oral HS Fernando Light  MD      escitalopram  5 mg Oral Every Other Day Fernando Light MD      famotidine  40 mg Oral HS Fernando Light MD      ferrous sulfate  325 mg Oral Every Other Day Fernando Light MD      HYDROmorphone  0.5 mg Intravenous Q2H PRN Fernando Light MD      insulin glargine  10 Units Subcutaneous QAM Fernando Light MD      insulin lispro  1-5 Units Subcutaneous TID AC Fernando Light MD      insulin lispro  8 Units Subcutaneous TID With Meals Fernando Light MD      linezolid  600 mg Intravenous Q12H Fernando Light  mg (05/15/25 2220)    magnesium gluconate  500 mg Oral BID Fernando Light MD      ondansetron  4 mg Intravenous Q4H PRN Fernando Light MD      oxybutynin  15 mg Oral HS Fernando Light MD      oxyCODONE  10 mg Oral Q4H PRN Fernando Light MD      pancrelipase (Lip-Prot-Amyl)  48,000 Units Oral TID With Meals Fernando Light MD      piperacillin-tazobactam  4.5 g Intravenous Q8H Fernando Light MD 4.5 g (05/16/25 0025)    polyethylene glycol  17 g Oral BID Fernando Light MD      predniSONE  40 mg Oral Daily Fernando Light MD      [Transfer Hold] sodium chloride  4 mL Nebulization BID Emilie Soyeon Kim, MD      tacrolimus  3 mg Oral BID Fernando Light MD      tretinoin  1 Application Topical HS Fernando Light MD      valACYclovir  500 mg Oral Daily Fernando Light MD          Today, Patient Was Seen By: Fernando Light MD    ** Please Note: Dictation voice to text software may have been used in the creation of this document. **

## 2025-05-16 NOTE — PLAN OF CARE
Problem: PAIN - ADULT  Goal: Verbalizes/displays adequate comfort level or baseline comfort level  Description: Interventions:  - Encourage patient to monitor pain and request assistance  - Assess pain using appropriate pain scale  - Administer analgesics as ordered based on type and severity of pain and evaluate response  - Implement non-pharmacological measures as appropriate and evaluate response  - Consider cultural and social influences on pain and pain management  - Notify physician/advanced practitioner if interventions unsuccessful or patient reports new pain  - Educate patient/family on pain management process including their role and importance of  reporting pain   - Provide non-pharmacologic/complimentary pain relief interventions  Outcome: Progressing     Problem: INFECTION - ADULT  Goal: Absence or prevention of progression during hospitalization  Description: INTERVENTIONS:  - Assess and monitor for signs and symptoms of infection  - Monitor lab/diagnostic results  - Monitor all insertion sites, i.e. indwelling lines, tubes, and drains  - Monitor endotracheal if appropriate and nasal secretions for changes in amount and color  - [place] appropriate cooling/warming therapies per order  - Administer medications as ordered  - Instruct and encourage patient and family to use good hand hygiene technique  - Identify and instruct in appropriate isolation precautions for identified infection/condition  Outcome: Progressing  Goal: Absence of fever/infection during neutropenic period  Description: INTERVENTIONS:  - Monitor WBC  - Perform strict hand hygiene  - Limit to healthy visitors only  - No plants, dried, fresh or silk flowers with bruce in patient room  Outcome: Progressing     Problem: SAFETY ADULT  Goal: Patient will remain free of falls  Description: INTERVENTIONS:  - Educate patient/family on patient safety including physical limitations  - Instruct patient to call for assistance with activity   -  Consider consulting OT/PT to assist with strengthening/mobility based on AM PAC & JH-HLM score  - Consult OT/PT to assist with strengthening/mobility   - Keep Call bell within reach  - Keep bed low and locked with side rails adjusted as appropriate  - Keep care items and personal belongings within reach  - Initiate and maintain comfort rounds  - Make Fall Risk Sign visible to staff  - Offer Toileting every 2 Hours, in advance of need  - Initiate/Maintain bed alarm  - Obtain necessary fall risk management equipment:   - Apply yellow socks and bracelet for high fall risk patients  - Consider moving patient to room near nurses station  Outcome: Progressing     Problem: Knowledge Deficit  Goal: Patient/family/caregiver demonstrates understanding of disease process, treatment plan, medications, and discharge instructions  Description: Complete learning assessment and assess knowledge base.  Interventions:  - Provide teaching at level of understanding  - Provide teaching via preferred learning methods  Outcome: Progressing

## 2025-05-16 NOTE — MALNUTRITION/BMI
This medical record reflects one or more clinical indicators suggestive of malnutrition    Malnutrition Findings:   Adult Malnutrition type: Chronic illness  Adult Degree of Malnutrition: Other severe protein calorie malnutrition  Malnutrition Characteristics: Inadequate energy, Weight loss          360 Statement: Related to chronic medical condition as evidenced by 15% weight loss over the past 4 months and <75% energy intake needs met >1 month treated with diet and oral nutrition supplements        See Nutrition note dated 5/16/25 for additional details.  Completed nutrition assessment is viewable in the nutrition documentation.

## 2025-05-16 NOTE — UTILIZATION REVIEW
Initial Clinical Review    Admission: Date/Time/Statement:   Admission Orders (From admission, onward)       Ordered        05/15/25 1054  INPATIENT ADMISSION  Once                          Orders Placed This Encounter   Procedures    INPATIENT ADMISSION     Standing Status:   Standing     Number of Occurrences:   1     Level of Care:   Med Surg [16]     Estimated length of stay:   More than 2 Midnights     Certification:   I certify that inpatient services are medically necessary for this patient for a duration of greater than two midnights. See H&P and MD Progress Notes for additional information about the patient's course of treatment.     ED Arrival Information       Expected   -    Arrival   5/15/2025 07:23    Acuity   Emergent              Means of arrival   Walk-In    Escorted by   Family Member    Service   Hospitalist    Admission type   Emergency              Arrival complaint   SOB & pain - lung transplant pt             Chief Complaint   Patient presents with    Shortness of Breath     Patient states that she was here on Saturday. Transplant patient. Functions off of one lung on the left. Patient states that she is in chronic rejection. States that she has had increased shortness of breath over the last few days. [facility] sent patient to the ER for possible transfer to their facility.      Initial Presentation: 49 y.o. female presents to the ED from home with c/o progressive dyspnea and nonproductive cough, no fevers, chills. Was seen in ED 5/10 for SOB and d/c home.  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  Last admit at \[facility]\"" 1/30/25 for MRSA and pseudomonas pneumonia.  She is being considered for transfer to \[facility]\"".  In the ED she is  HTN, pain rated 8/10, is tachypneic on room air then started on NC oxygen 3L. Treated with IV Dilaudid x4, IV fluids, IV antibiotics, home meds.  Labs - elevated BNP, lactic acid, glucose 330, BUN, low platelets.  ECG - NSR w/ marked ST abnormality.  Imaging - transplanted L lung, mild diffuse bronchiolitis without new findings. On exam mild rhonchi bilat, no wheezes, rales, alert.  Admitted to INPATIENT status with SOB, lactic acidosis, immunosuppression for lung transplant, chronic pain disorder, IDDM - PLAN: IV antibiotics, Pulmonary consult, Tacrolimus, Prednisone, blood cultures, RP2 (neg), PO Lasix 40 mg x 1, IV fluids, trend lactic acid til WNL, Lantus 10 U AM, start Lispro 8 u TID w/ meals, POC glucose monitoring, continue Creon, Albulterol BID, home analgesia regimen, Eliquis, Lexapro, home valacyclovir 500 daily, azithromycin 250 mg 3 times weekly on MWF, check Tacrolimus level.      Anticipated Length of Stay/Certification Statement: Patient will be admitted on an inpatient basis with an anticipated length of stay of greater than 2 midnights secondary to dyspnea with suspected PNA.     5/15 Pulmonary Consult - SOB, chronic transplanted lung rejection, lactic acidosis, bronchopneumonia - on exam in no acute distress, lungs diminished breath sounds, A&O.  Continue IV Linezolid, Zosyn, Prednisone 40 mg x 5 days with taper while waiting transfer to [place], [facility] nebs w/ Symbicort.  Continue Tacrominus, Atovaquione, Azithromycin.     Date: 5/16   Day 2:   SOB, lactic acidosis, immunosuppression for lung transplant, chronic pain disorder, IDDM - continue IV antibiotics, rejection meds, using IV PRN Dilaudid, less tachypnea today, on oxygen 3L NC. Glucose 200s, BUN/CR WNL. Pt is [facility] at [facility] when bed is available.  [facility]      ED Treatment-Medication Administration from 05/15/2025 1883 to 05/15/2025 4414         Date/Time Order Dose Route Action     05/15/2025 7355  HYDROmorphone (DILAUDID) injection 1 mg 1 mg Intravenous Given     05/15/2025 0933 iohexol (OMNIPAQUE) 350 MG/ML injection (MULTI-DOSE) 85 mL 85 mL Intravenous Given     05/15/2025 1053 sodium chloride 0.9 % bolus 1,000 mL 1,000 mL Intravenous New Bag     05/15/2025 1053 piperacillin-tazobactam (ZOSYN) IVPB 4.5 g 4.5 g Intravenous New Bag     05/15/2025 1158 linezolid (ZYVOX) IVPB (premix in dextrose) 600 mg 300 mL 600 mg Intravenous New Bag     05/15/2025 1416 pancrelipase (Lip-Prot-Amyl) (CREON) delayed release capsule 48,000 Units 48,000 Units Oral Given     05/15/2025 1826 pancrelipase (Lip-Prot-Amyl) (CREON) delayed release capsule 48,000 Units 48,000 Units Oral Given     05/15/2025 1337 valACYclovir (VALTREX) tablet 500 mg 500 mg Oral Given     05/15/2025 2010 albuterol inhalation solution 5 mg 5 mg Nebulization Given     05/15/2025 1337 acetaminophen (TYLENOL) tablet 975 mg 975 mg Oral Given     05/15/2025 1337 Cholecalciferol (VITAMIN D3) tablet 5,000 Units 5,000 Units Oral Given     05/15/2025 1337 cyanocobalamin (VITAMIN B-12) tablet 1,000 mcg 1,000 mcg Oral Given     05/15/2025 1336 polyethylene glycol (MIRALAX) packet 17 g 17 g Oral Given     05/15/2025 1337 apixaban (ELIQUIS) tablet 5 mg 5 mg Oral Given     05/15/2025 1834 apixaban (ELIQUIS) tablet 5 mg 5 mg Oral Given     05/15/2025 1241 albuterol (PROVENTIL HFA,VENTOLIN HFA) inhaler 2 puff 2 puff Inhalation Given     05/15/2025 1337 furosemide (LASIX) tablet 40 mg 40 mg Oral Given     05/15/2025 1353 magnesium gluconate (MAGONATE) tablet 500 mg 500 mg Oral Given     05/15/2025 1827 magnesium gluconate (MAGONATE) tablet 500 mg 500 mg Oral Given     05/15/2025 1415 tacrolimus (PROGRAF) capsule 3 mg 3 mg Oral Given     05/15/2025 1835 tacrolimus (PROGRAF) capsule 3 mg 3 mg Oral Given     05/15/2025 1352 ALPRAZolam (XANAX) tablet 1 mg 1 mg Oral Given     05/15/2025 1825 pantoprazole (PROTONIX) EC tablet 40 mg 40 mg Oral Given     05/15/2025 1834 insulin  lispro (HumALOG/ADMELOG) 100 units/mL subcutaneous injection 8 Units 8 Units Subcutaneous Given     05/15/2025 1543 insulin glargine (LANTUS) subcutaneous injection 10 Units 0.1 mL 10 Units Subcutaneous Given     05/15/2025 1337 predniSONE tablet 40 mg 40 mg Oral Given     05/15/2025 1242 HYDROmorphone (DILAUDID) injection 0.5 mg 0.5 mg Intravenous Given     05/15/2025 1737 HYDROmorphone (DILAUDID) injection 0.5 mg 0.5 mg Intravenous Given     05/15/2025 2059 HYDROmorphone (DILAUDID) injection 0.5 mg 0.5 mg Intravenous Given     05/15/2025 1834 insulin lispro (HumALOG/ADMELOG) 100 units/mL subcutaneous injection 1-5 Units 3 Units Subcutaneous Given     05/15/2025 1542 piperacillin-tazobactam (ZOSYN) IVPB (EXTENDED INFUSION) 4.5 g 4.5 g Intravenous New Bag     05/15/2025 1637 ondansetron (ZOFRAN) injection 4 mg 4 mg Intravenous Given     05/15/2025 2104 piperacillin-tazobactam (ZOSYN) IVPB 4.5 g 4.5 g Intravenous New Bag            Scheduled Medications:  acetaminophen, 975 mg, Oral, Q8H DOTTY  albuterol, 5 mg, Nebulization, BID  apixaban, 5 mg, Oral, BID  vitamin C, 500 mg, Oral, Daily  atovaquone, 1,500 mg, Oral, Daily  azithromycin, 250 mg, Oral, Once per day on Monday Wednesday Friday  bisacodyl, 5 mg, Oral, HS  budesonide-formoterol, 2 puff, Inhalation, Q12H  Cholecalciferol, 5,000 Units, Oral, Daily  vitamin B-12, 1,000 mcg, Oral, Daily  dexlansoprazole, 60 mg, Oral, HS  escitalopram, 5 mg, Oral, Every Other Day  famotidine, 40 mg, Oral, HS  ferrous sulfate, 325 mg, Oral, Every Other Day  insulin glargine, 10 Units, Subcutaneous, QAM  insulin lispro, 1-5 Units, Subcutaneous, TID AC  insulin lispro, 8 Units, Subcutaneous, TID With Meals  linezolid, 600 mg, Intravenous, Q12H  magnesium gluconate, 500 mg, Oral, BID  oxybutynin, 15 mg, Oral, HS  pancrelipase (Lip-Prot-Amyl), 48,000 Units, Oral, TID With Meals  piperacillin-tazobactam, 4.5 g, Intravenous, Q8H  polyethylene glycol, 17 g, Oral, BID  predniSONE, 40 mg,  Oral, Daily  [Transfer Hold] sodium chloride, 4 mL, Nebulization, BID  tacrolimus, 3 mg, Oral, BID  tretinoin, 1 Application, Topical, HS  valACYclovir, 500 mg, Oral, Daily      Continuous IV Infusions:     PRN Meds:  albuterol, 2 puff, Inhalation, Q4H PRN  ALPRAZolam, 1 mg, Oral, BID PRN - x 1 5/15, x 1 5/16  HYDROmorphone, 0.5 mg, Intravenous, Q2H PRN - x 4 5/15, x 2 5/16  ondansetron, 4 mg, Intravenous, Q4H PRN - x 2 5/15, x 1 5/16  oxyCODONE, 10 mg, Oral, Q4H PRN - x 2 5/16      ED Triage Vitals   Temperature Pulse Respirations Blood Pressure SpO2 Pain Score   05/15/25 0726 05/15/25 0726 05/15/25 0726 05/15/25 0726 05/15/25 0726 05/15/25 0922   98.6 °F (37 °C) 87 20 (!) 170/104 95 % 8     Weight (last 2 days)       Date/Time Weight    05/15/25 2145 51.1 (112.66)            Vital Signs (last 3 days)       Date/Time Temp Pulse Resp BP MAP (mmHg) SpO2 Calculated FIO2 (%) - Nasal Cannula Nasal Cannula O2 Flow Rate (L/min) O2 Device Patient Position - Orthostatic VS [place] Coma Scale Score Pain    05/16/25 15:20:58 97.7 °F (36.5 °C) 81 18 125/82 96 97 % -- -- -- -- -- --    05/16/25 1400 -- 80 -- -- -- 97 % -- -- -- -- -- --    05/16/25 1345 -- 83 20 -- -- 97 % -- -- -- -- -- --    05/16/25 1337 -- -- -- -- -- -- -- -- -- -- -- 8    05/16/25 1336 -- -- -- -- -- -- -- -- -- -- -- 8    05/16/25 1330 -- 82 -- -- -- 97 % -- -- None (Room air) -- -- --    05/16/25 1315 -- 94 20 -- -- 95 % -- -- None (Room air) -- -- --    05/16/25 0948 -- -- -- -- -- -- -- -- -- -- -- 7 05/16/25 0944 -- 78 -- -- -- 96 % -- -- -- -- -- 7 05/16/25 0900 -- 73 -- -- -- 96 % -- -- None (Room air) -- -- --    05/16/25 0800 -- -- -- -- -- -- -- -- -- -- 15 --    05/16/25 0731 -- -- -- -- -- 100 % 32 3 L/min Nasal cannula -- -- --    05/16/25 0700 97.9 °F (36.6 °C) 58 20 117/73 91 95 % 32 3 L/min Nasal cannula Lying -- --    05/16/25 0537 -- -- -- -- -- -- -- -- -- -- -- 7 05/16/25 0307 -- -- -- -- -- -- -- -- -- -- -- 8 05/16/25  0300 -- 66 -- 143/78 105 98 % -- -- -- -- -- --    05/16/25 0200 -- 67 -- 148/86 111 98 % -- -- -- -- -- --    05/16/25 0100 -- 71 18 122/81 97 97 % 32 3 L/min Nasal cannula -- -- --    05/16/25 0000 -- 79 24 143/92 110 96 % 32 3 L/min Nasal cannula -- -- --    05/15/25 2345 -- -- -- -- -- -- -- -- -- -- -- 7    05/15/25 2206 -- -- -- -- -- -- -- -- -- -- -- 6    05/15/25 2145 98.6 °F (37 °C) 84 24 144/88 109 94 % 32 3 L/min Nasal cannula Sitting 15 --    05/15/25 2059 -- -- -- -- -- -- -- -- -- -- -- 7    05/15/25 1900 -- 77 24 128/79 98 96 % -- -- -- -- -- --    05/15/25 1737 -- -- -- -- -- -- -- -- -- -- -- 10 - Worst Possible Pain    05/15/25 1700 -- 81 23 128/74 95 95 % -- -- None (Room air) -- -- --    05/15/25 1600 -- 77 24 131/86 103 93 % -- -- None (Room air) -- -- --    05/15/25 1530 -- 80 18 -- -- 93 % -- -- None (Room air) -- -- --    05/15/25 1413 -- 81 -- -- -- 88 % -- -- -- -- -- --    05/15/25 1400 -- 75 18 -- -- 97 % -- -- -- -- -- --    05/15/25 1337 -- -- -- -- -- -- -- -- -- -- -- 10 - Worst Possible Pain    05/15/25 1315 -- 81 24 -- -- 96 % -- -- -- -- -- --    05/15/25 1242 -- -- -- -- -- -- -- -- -- -- -- 10 - Worst Possible Pain    05/15/25 1230 -- 72 28 142/90 111 94 % -- -- None (Room air) Sitting -- --    05/15/25 1200 -- 80 28 173/101 129 95 % -- -- None (Room air) -- -- --    05/15/25 1130 -- 84 21 166/97 125 92 % -- -- -- -- -- --    05/15/25 1100 -- 76 20 135/96 112 93 % -- -- None (Room air) -- -- --    05/15/25 1012 -- -- -- -- -- -- -- -- None (Room air) -- -- --    05/15/25 1000 -- 81 22 157/105 125 95 % -- -- -- -- -- --    05/15/25 0930 -- 79 47 166/97 126 95 % -- -- -- -- -- --    05/15/25 0922 -- -- -- -- -- -- -- -- -- -- -- 8    05/15/25 0830 -- 76 36 187/121 147 95 % -- -- None (Room air) -- -- --    05/15/25 0726 98.6 °F (37 °C) 87 20 170/104 -- 95 % -- -- None (Room air) Sitting -- --              Pertinent Labs/Diagnostic Test Results:   Radiology:  CTA chest pe study    Final Interpretation by Miguel Ángel Toth MD (05/15 0956)      Status post right pneumonectomy and left lung transplant.      Similar findings of a mild diffuse bronchiolitis without new findings.                  Workstation performed: [id number]         XR chest 1 view portable   ED Interpretation by Jose Rangel MD (05/15 0826)   No change as compared to 5/10/25      Final Interpretation by Raymon Parry MD (05/15 1156)      No significant interval change. No left-sided consolidation, pleural effusion, or pneumothorax.            Resident: Kermit Hanna I, the attending radiologist, have reviewed the images and agree with the final report above.      Workstation performed: [id number]           Cardiology:  ECG 12 lead   Final Result by Singh Romero MD (05/16 0810)   Normal sinus rhythm   Marked ST abnormality, possible lateral subendocardial injury   Abnormal ECG   When compared with ECG of 10-May-2025 21:01,   T wave inversion now evident in Lateral leads   Confirmed by Singh Romero ([id number]) on 5/16/2025 8:10:11 AM        GI:  No orders to display       Results from last 7 days   Lab Units 05/15/25  1604 05/10/25  2304   SARS-COV-2  Not Detected Negative     Results from last 7 days   Lab Units 05/16/25  0438 05/15/25  0820 05/10/25  2206   WBC Thousand/uL 5.42 6.13 6.73   HEMOGLOBIN g/dL 11.2* 11.9 14.0   HEMATOCRIT % 35.5 39.3 44.5   PLATELETS Thousands/uL 128* 143* 176   TOTAL NEUT ABS Thousands/µL 3.80 3.93 5.26         Results from last 7 days   Lab Units 05/16/25  0438 05/15/25  0820 05/10/25  2206   SODIUM mmol/L 136 135 130*   POTASSIUM mmol/L 4.2 4.1 5.3   CHLORIDE mmol/L 103 104 97   CO2 mmol/L 26 22 25   ANION GAP mmol/L 7 9 8   BUN mg/dL 23 29* 27*   CREATININE mg/dL 1.23 1.16 1.46*   EGFR ml/min/1.73sq m 52 56 42   CALCIUM mg/dL 8.2* 8.8 9.4     Results from last 7 days   Lab Units 05/15/25  0820 05/10/25  2206   AST U/L 14 13   ALT U/L 21 16   ALK PHOS U/L  51 62   TOTAL PROTEIN g/dL 6.6 7.6   ALBUMIN g/dL 3.8 4.2   TOTAL BILIRUBIN mg/dL 0.30 0.51   BILIRUBIN DIRECT mg/dL 0.06 0.08     Results from last 7 days   Lab Units 05/16/25  1110 05/16/25  0807 05/16/25  0716 05/15/25  2132 05/15/25  1832 05/15/25  1352   POC GLUCOSE mg/dl 198* 157* 156* 312* 349* 173*     Results from last 7 days   Lab Units 05/16/25  0438 05/15/25  0820 05/10/25  2206   GLUCOSE RANDOM mg/dL 230* 330* 313*         Results from last 7 days   Lab Units 05/15/25  0820   HEMOGLOBIN A1C % 9.2*   EAG mg/dl 217     Beta- Hydroxybutyrate   Date Value Ref Range Status   10/21/2024 0.27 0.02 - 0.27 mmol/L Final   05/21/2024 0.06 0.02 - 0.27 mmol/L Final          Results from last 7 days   Lab Units 05/15/25  0820   PH LORI  7.326   PCO2 LORI mm Hg 36.0*   PO2 LORI mm Hg 39.0   HCO3 LORI mmol/L 18.4*   BASE EXC LORI mmol/L -6.8   O2 CONTENT LORI ml/dL 13.4   O2 HGB, VENOUS % 73.6             Results from last 7 days   Lab Units 05/15/25  0820 05/11/25  0024 05/10/25  2206   HS TNI 0HR ng/L 23  --  13   HS TNI 2HR ng/L  --  14  --    HSTNI D2 ng/L  --  1  --          Results from last 7 days   Lab Units 05/15/25  0820 05/10/25  2206   PROTIME seconds 14.2 14.8   INR  1.03 1.09   PTT seconds 26 28         Results from last 7 days   Lab Units 05/15/25  0820 05/10/25  2206   PROCALCITONIN ng/ml <0.05 <0.05     Results from last 7 days   Lab Units 05/15/25  1106 05/15/25  0820 05/10/25  2206   LACTIC ACID mmol/L 2.3* 3.3* 1.3             Results from last 7 days   Lab Units 05/15/25  0820 05/10/25  2206   BNP pg/mL 779* 394*         Results from last 7 days   Lab Units 05/15/25  1604 05/10/25  2304   INFLUENZA A PCR   --  Negative   INFLUENZA B PCR   --  Negative   INFLUENZA B  Not Detected  --    RSV PCR   --  Negative   RESPIRATORY SYNCYTIAL VIRUS  Not Detected  --            Results from last 7 days   Lab Units 05/15/25  1008 05/15/25  0857   BLOOD CULTURE  Received in Microbiology Lab. Culture in Progress. No  Growth at 24 hrs.         Past Medical History:   Diagnosis Date    Abnormal uterine bleeding (AUB) 7/7/2022    ABPA (allergic bronchopulmonary aspergillosis) ([facility]) 07/08/2012    Acute kidney injury ([facility]) 09/08/2017    Acute on chronic respiratory failure ([facility]) 03/04/2017    Last Assessment & Plan:  Formatting of this note might be different from the original. Recurrent with clinical worsening with enlarging PTX, s/p multiple chest tubes, but new fevers 6/19 and worsening leukocytosis indicating infection as cause of 6/19 decompensation with profound hypoxia in setting of shunting through right lung and continued pneumothorax s/p 2nd chest tube placement.  6/27: Right    Anaphylaxis 06/06/2022    Anemia     Asthma     Chest pain 09/21/2021    Chronic pain     Chronic respiratory failure with hypoxia ([facility]) 09/07/2021    COVID-19 06/03/2022    Cystic fibrosis ([facility])     Deep vein thrombosis ([facility])     Diabetes mellitus ([facility])     Fever 09/22/2024    GERD (gastroesophageal reflux disease)     HBP (high blood pressure)     HCAP (healthcare-associated pneumonia) 03/06/2020    History of transfusion     Influenza B 02/23/2020    Kidney stone     Moderate protein-calorie malnutrition ([facility]) 01/08/2022    Neutropenic fever  ([facility]) 09/26/2020    Pneumonia due to Pseudomonas species ([facility]) 10/28/2010    Last Assessment & Plan:  Formatting of this note might be different from the original. Polymicrobial pna w/ pseudomonas, M. Abscessus, Cadida glabrata/albicans    PONV (postoperative nausea and vomiting)     Pseudomonas aeruginosa infection 09/20/2012    Renal calculus     Squamous cell skin cancer 01/12/2023    SCCIS- Left upper thigh    Transplant recipient     Lung- 2013, 2017    Ulcerative colitis ([facility])     Wellness examination 02/04/2013    Formatting of this note might be different from the original. Discussed with patient and acknowledgement form signed on 2/4/2013 (ALYSHA)     Present on Admission:   Type 1 diabetes mellitus  with diabetic chronic kidney disease, unspecified CKD stage (HCC)   Pancreatic insufficiency   Asthma   Chronic pain disorder   DVT (deep venous thrombosis) (HCC)   Anxiety   Immunosuppression (HCC)   Essential hypertension   Stage 3a chronic kidney disease (HCC)   Chronic rejection of lung transplant (HCC)   Shortness of breath   Lactic acidosis      Admitting Diagnosis: Dyspnea [R06.00]  SOB (shortness of breath) [R06.02]  Lung transplant recipient (HCC) [Z94.2]  Age/Sex: 49 y.o. female    Network Utilization Review Department  ATTENTION: Please call with any questions or concerns to [phone] and carefully listen to the prompts so that you are directed to the right person. All voicemails are confidential.   For Discharge needs, contact Care Management DC Support Team at [phone] opt. [phone]  Send all requests for admission clinical reviews, approved or denied determinations and any other requests to dedicated fax number below belonging to the campus where the patient is receiving treatment. List of dedicated fax numbers for the Facilities:  FACILITY NAME UR FAX NUMBER   ADMISSION DENIALS (Administrative/Medical Necessity) [phone]   DISCHARGE SUPPORT TEAM (NETWORK) [phone]   PARENT CHILD HEALTH (Maternity/NICU/Pediatrics) [phone]   [facility] [phone]   [facility] [phone]   [facility] [phone]   [facility] [phone]   [facility] [phone]   [facility] [phone]   [facility] [phone]   [facility] [phone]   [facility] [phone]   [facility] [phone]   [facility] [phone]   [facility] [phone]

## 2025-05-17 LAB
ALBUMIN SERPL BCG-MCNC: 3.4 G/DL (ref 3.5–5)
ALP SERPL-CCNC: 39 U/L (ref 34–104)
ALT SERPL W P-5'-P-CCNC: 21 U/L (ref 7–52)
ANION GAP SERPL CALCULATED.3IONS-SCNC: 6 MMOL/L (ref 4–13)
AST SERPL W P-5'-P-CCNC: 21 U/L (ref 13–39)
BASOPHILS # BLD AUTO: 0.01 THOUSANDS/ÂΜL (ref 0–0.1)
BASOPHILS NFR BLD AUTO: 0 % (ref 0–1)
BILIRUB SERPL-MCNC: 0.31 MG/DL (ref 0.2–1)
BUN SERPL-MCNC: 22 MG/DL (ref 5–25)
CALCIUM ALBUM COR SERPL-MCNC: 8.8 MG/DL (ref 8.3–10.1)
CALCIUM SERPL-MCNC: 8.3 MG/DL (ref 8.4–10.2)
CHLORIDE SERPL-SCNC: 104 MMOL/L (ref 96–108)
CO2 SERPL-SCNC: 28 MMOL/L (ref 21–32)
CREAT SERPL-MCNC: 1.08 MG/DL (ref 0.6–1.3)
EOSINOPHIL # BLD AUTO: 0.06 THOUSAND/ÂΜL (ref 0–0.61)
EOSINOPHIL NFR BLD AUTO: 1 % (ref 0–6)
ERYTHROCYTE [DISTWIDTH] IN BLOOD BY AUTOMATED COUNT: 15.9 % (ref 11.6–15.1)
GFR SERPL CREATININE-BSD FRML MDRD: 61 ML/MIN/1.73SQ M
GLUCOSE SERPL-MCNC: 105 MG/DL (ref 65–140)
GLUCOSE SERPL-MCNC: 111 MG/DL (ref 65–140)
GLUCOSE SERPL-MCNC: 158 MG/DL (ref 65–140)
GLUCOSE SERPL-MCNC: 162 MG/DL (ref 65–140)
GLUCOSE SERPL-MCNC: 182 MG/DL (ref 65–140)
HCT VFR BLD AUTO: 34.8 % (ref 34.8–46.1)
HGB BLD-MCNC: 10.9 G/DL (ref 11.5–15.4)
IMM GRANULOCYTES # BLD AUTO: 0.03 THOUSAND/UL (ref 0–0.2)
IMM GRANULOCYTES NFR BLD AUTO: 1 % (ref 0–2)
LYMPHOCYTES # BLD AUTO: 1.51 THOUSANDS/ÂΜL (ref 0.6–4.47)
LYMPHOCYTES NFR BLD AUTO: 30 % (ref 14–44)
MCH RBC QN AUTO: 29.3 PG (ref 26.8–34.3)
MCHC RBC AUTO-ENTMCNC: 31.3 G/DL (ref 31.4–37.4)
MCV RBC AUTO: 94 FL (ref 82–98)
MONOCYTES # BLD AUTO: 0.43 THOUSAND/ÂΜL (ref 0.17–1.22)
MONOCYTES NFR BLD AUTO: 9 % (ref 4–12)
NEUTROPHILS # BLD AUTO: 2.96 THOUSANDS/ÂΜL (ref 1.85–7.62)
NEUTS SEG NFR BLD AUTO: 59 % (ref 43–75)
NRBC BLD AUTO-RTO: 0 /100 WBCS
PLATELET # BLD AUTO: 138 THOUSANDS/UL (ref 149–390)
PMV BLD AUTO: 9.2 FL (ref 8.9–12.7)
POTASSIUM SERPL-SCNC: 4.3 MMOL/L (ref 3.5–5.3)
PROT SERPL-MCNC: 6.1 G/DL (ref 6.4–8.4)
RBC # BLD AUTO: 3.72 MILLION/UL (ref 3.81–5.12)
SODIUM SERPL-SCNC: 138 MMOL/L (ref 135–147)
WBC # BLD AUTO: 5 THOUSAND/UL (ref 4.31–10.16)

## 2025-05-17 PROCEDURE — 82948 REAGENT STRIP/BLOOD GLUCOSE: CPT

## 2025-05-17 PROCEDURE — 94640 AIRWAY INHALATION TREATMENT: CPT

## 2025-05-17 PROCEDURE — 85025 COMPLETE CBC W/AUTO DIFF WBC: CPT | Performed by: HOSPITALIST

## 2025-05-17 PROCEDURE — 94760 N-INVAS EAR/PLS OXIMETRY 1: CPT

## 2025-05-17 PROCEDURE — 99233 SBSQ HOSP IP/OBS HIGH 50: CPT | Performed by: INTERNAL MEDICINE

## 2025-05-17 PROCEDURE — 80053 COMPREHEN METABOLIC PANEL: CPT | Performed by: HOSPITALIST

## 2025-05-17 RX ORDER — ACETAMINOPHEN 10 MG/ML
1000 INJECTION, SOLUTION INTRAVENOUS ONCE
Status: DISCONTINUED | OUTPATIENT
Start: 2025-05-17 | End: 2025-05-18 | Stop reason: HOSPADM

## 2025-05-17 RX ORDER — HYDROMORPHONE HCL/PF 1 MG/ML
1 SYRINGE (ML) INJECTION EVERY 4 HOURS PRN
Status: DISCONTINUED | OUTPATIENT
Start: 2025-05-17 | End: 2025-05-18 | Stop reason: HOSPADM

## 2025-05-17 RX ORDER — HYDROMORPHONE HCL/PF 1 MG/ML
0.5 SYRINGE (ML) INJECTION EVERY 2 HOUR PRN
Status: DISCONTINUED | OUTPATIENT
Start: 2025-05-17 | End: 2025-05-18 | Stop reason: HOSPADM

## 2025-05-17 RX ORDER — CALCIUM CARBONATE 500 MG/1
500 TABLET, CHEWABLE ORAL DAILY PRN
Status: DISCONTINUED | OUTPATIENT
Start: 2025-05-17 | End: 2025-05-18 | Stop reason: HOSPADM

## 2025-05-17 RX ADMIN — LINEZOLID 600 MG: 600 INJECTION, SOLUTION INTRAVENOUS at 23:01

## 2025-05-17 RX ADMIN — OXYCODONE HYDROCHLORIDE AND ACETAMINOPHEN 500 MG: 500 TABLET ORAL at 08:26

## 2025-05-17 RX ADMIN — LINEZOLID 600 MG: 600 INJECTION, SOLUTION INTRAVENOUS at 14:11

## 2025-05-17 RX ADMIN — ATOVAQUONE 1500 MG: 750 SUSPENSION ORAL at 08:28

## 2025-05-17 RX ADMIN — PREDNISONE 40 MG: 20 TABLET ORAL at 08:26

## 2025-05-17 RX ADMIN — VALACYCLOVIR HYDROCHLORIDE 500 MG: 500 TABLET, FILM COATED ORAL at 08:26

## 2025-05-17 RX ADMIN — BUDESONIDE AND FORMOTEROL FUMARATE DIHYDRATE 2 PUFF: 160; 4.5 AEROSOL RESPIRATORY (INHALATION) at 12:22

## 2025-05-17 RX ADMIN — HYDROMORPHONE HYDROCHLORIDE 0.5 MG: 1 INJECTION, SOLUTION INTRAMUSCULAR; INTRAVENOUS; SUBCUTANEOUS at 16:12

## 2025-05-17 RX ADMIN — HYDROMORPHONE HYDROCHLORIDE 1 MG: 1 INJECTION, SOLUTION INTRAMUSCULAR; INTRAVENOUS; SUBCUTANEOUS at 09:53

## 2025-05-17 RX ADMIN — HYDROMORPHONE HYDROCHLORIDE 0.5 MG: 1 INJECTION, SOLUTION INTRAMUSCULAR; INTRAVENOUS; SUBCUTANEOUS at 12:23

## 2025-05-17 RX ADMIN — HYDROMORPHONE HYDROCHLORIDE 0.5 MG: 1 INJECTION, SOLUTION INTRAMUSCULAR; INTRAVENOUS; SUBCUTANEOUS at 22:03

## 2025-05-17 RX ADMIN — APIXABAN 5 MG: 5 TABLET, FILM COATED ORAL at 08:26

## 2025-05-17 RX ADMIN — HYDROMORPHONE HYDROCHLORIDE 0.5 MG: 1 INJECTION, SOLUTION INTRAMUSCULAR; INTRAVENOUS; SUBCUTANEOUS at 03:19

## 2025-05-17 RX ADMIN — HYDROMORPHONE HYDROCHLORIDE 0.5 MG: 1 INJECTION, SOLUTION INTRAMUSCULAR; INTRAVENOUS; SUBCUTANEOUS at 07:30

## 2025-05-17 RX ADMIN — PANCRELIPASE 48000 UNITS: 120000; 24000; 76000 CAPSULE, DELAYED RELEASE PELLETS ORAL at 12:23

## 2025-05-17 RX ADMIN — INSULIN LISPRO 8 UNITS: 100 INJECTION, SOLUTION INTRAVENOUS; SUBCUTANEOUS at 17:31

## 2025-05-17 RX ADMIN — OXYCODONE HYDROCHLORIDE 10 MG: 10 TABLET ORAL at 02:03

## 2025-05-17 RX ADMIN — BUDESONIDE AND FORMOTEROL FUMARATE DIHYDRATE 2 PUFF: 160; 4.5 AEROSOL RESPIRATORY (INHALATION) at 00:44

## 2025-05-17 RX ADMIN — ONDANSETRON 4 MG: 2 INJECTION INTRAMUSCULAR; INTRAVENOUS at 07:40

## 2025-05-17 RX ADMIN — FAMOTIDINE 40 MG: 20 TABLET, FILM COATED ORAL at 22:00

## 2025-05-17 RX ADMIN — CYANOCOBALAMIN TAB 500 MCG 1000 MCG: 500 TAB at 08:26

## 2025-05-17 RX ADMIN — CALCIUM CARBONATE (ANTACID) CHEW TAB 500 MG 500 MG: 500 CHEW TAB at 14:42

## 2025-05-17 RX ADMIN — INSULIN LISPRO 8 UNITS: 100 INJECTION, SOLUTION INTRAVENOUS; SUBCUTANEOUS at 12:23

## 2025-05-17 RX ADMIN — INSULIN LISPRO 8 UNITS: 100 INJECTION, SOLUTION INTRAVENOUS; SUBCUTANEOUS at 07:31

## 2025-05-17 RX ADMIN — PANCRELIPASE 48000 UNITS: 120000; 24000; 76000 CAPSULE, DELAYED RELEASE PELLETS ORAL at 07:35

## 2025-05-17 RX ADMIN — APIXABAN 5 MG: 5 TABLET, FILM COATED ORAL at 17:30

## 2025-05-17 RX ADMIN — ALBUTEROL SULFATE 5 MG: 2.5 SOLUTION RESPIRATORY (INHALATION) at 19:22

## 2025-05-17 RX ADMIN — OXYBUTYNIN CHLORIDE 15 MG: 5 TABLET, EXTENDED RELEASE ORAL at 22:00

## 2025-05-17 RX ADMIN — ALPRAZOLAM 1 MG: 0.5 TABLET ORAL at 23:01

## 2025-05-17 RX ADMIN — HYDROMORPHONE HYDROCHLORIDE 1 MG: 1 INJECTION, SOLUTION INTRAMUSCULAR; INTRAVENOUS; SUBCUTANEOUS at 14:11

## 2025-05-17 RX ADMIN — ACETAMINOPHEN 975 MG: 325 TABLET ORAL at 12:22

## 2025-05-17 RX ADMIN — Medication 4.5 G: at 02:03

## 2025-05-17 RX ADMIN — DEXLANSOPRAZOLE 60 MG: 60 CAPSULE, DELAYED RELEASE ORAL at 22:03

## 2025-05-17 RX ADMIN — Medication 4.5 G: at 08:27

## 2025-05-17 RX ADMIN — PANCRELIPASE 48000 UNITS: 120000; 24000; 76000 CAPSULE, DELAYED RELEASE PELLETS ORAL at 17:31

## 2025-05-17 RX ADMIN — POLYETHYLENE GLYCOL 3350 17 G: 17 POWDER, FOR SOLUTION ORAL at 08:27

## 2025-05-17 RX ADMIN — INSULIN LISPRO 1 UNITS: 100 INJECTION, SOLUTION INTRAVENOUS; SUBCUTANEOUS at 07:31

## 2025-05-17 RX ADMIN — ALPRAZOLAM 1 MG: 0.5 TABLET ORAL at 14:42

## 2025-05-17 RX ADMIN — HYDROMORPHONE HYDROCHLORIDE 0.5 MG: 1 INJECTION, SOLUTION INTRAMUSCULAR; INTRAVENOUS; SUBCUTANEOUS at 05:38

## 2025-05-17 RX ADMIN — TACROLIMUS 3 MG: 1 CAPSULE ORAL at 08:27

## 2025-05-17 RX ADMIN — ALBUTEROL SULFATE 5 MG: 2.5 SOLUTION RESPIRATORY (INHALATION) at 06:13

## 2025-05-17 RX ADMIN — Medication 500 MG: at 17:30

## 2025-05-17 RX ADMIN — Medication 500 MG: at 08:27

## 2025-05-17 RX ADMIN — Medication 4.5 G: at 17:30

## 2025-05-17 RX ADMIN — INSULIN GLARGINE 10 UNITS: 100 INJECTION, SOLUTION SUBCUTANEOUS at 08:26

## 2025-05-17 RX ADMIN — ACETAMINOPHEN 975 MG: 325 TABLET ORAL at 22:00

## 2025-05-17 RX ADMIN — Medication 5000 UNITS: at 08:27

## 2025-05-17 RX ADMIN — HYDROMORPHONE HYDROCHLORIDE 1 MG: 1 INJECTION, SOLUTION INTRAMUSCULAR; INTRAVENOUS; SUBCUTANEOUS at 19:21

## 2025-05-17 RX ADMIN — ACETAMINOPHEN 975 MG: 325 TABLET ORAL at 05:00

## 2025-05-17 RX ADMIN — ONDANSETRON 4 MG: 2 INJECTION INTRAMUSCULAR; INTRAVENOUS at 20:04

## 2025-05-17 RX ADMIN — TACROLIMUS 3 MG: 1 CAPSULE ORAL at 17:30

## 2025-05-17 NOTE — RESPIRATORY THERAPY NOTE
RT Protocol Note  Maximilian RAY Sourav Mann 49 y.o. female MRN: [id number]  Unit/Bed#: -01 Encounter: [id number]    Assessment    Principal Problem:    Shortness of breath  Active Problems:    S/P pneumonectomy    Type 1 diabetes mellitus with diabetic chronic kidney disease, unspecified CKD stage ([facility])    Pancreatic insufficiency    Asthma    Chronic pain disorder    DVT (deep venous thrombosis) ([facility])    Anxiety    Immunosuppression ([facility])    Essential hypertension    Stage 3a chronic kidney disease ([facility])    Lactic acidosis    Chronic rejection of lung transplant ([facility])    Bronchopneumonia      Home Pulmonary Medications:     05/17/25 1923   Respiratory Protocol   Protocol Initiated? No   Protocol Selection Respiratory   Language Barrier? No   Medical & Social History Reviewed? Yes   Diagnostic Studies Reviewed? Yes   Physical Assessment Performed? Yes   Respiratory Plan No distress/Pulmonary history   Respiratory Assessment   Assessment Type During-treatment   General Appearance Awake;Alert   Chest Assessment Chest expansion symmetrical   Bilateral Breath Sounds Clear   Cough None   Resp Comments Will continue with current therapy   O2 Device RA   Cough Description   Sputum Amount None   Additional Assessments   Pulse 75   Respirations 20   SpO2 97 %            Past Medical History:   Diagnosis Date    Abnormal uterine bleeding (AUB) 7/7/2022    ABPA (allergic bronchopulmonary aspergillosis) ([facility]) 07/08/2012    Acute kidney injury ([facility]) 09/08/2017    Acute on chronic respiratory failure ([facility]) 03/04/2017    Last Assessment & Plan:  Formatting of this note might be different from the original. Recurrent with clinical worsening with enlarging PTX, s/p multiple chest tubes, but new fevers 6/19 and worsening leukocytosis indicating infection as cause of 6/19 decompensation with profound hypoxia in setting of shunting through right lung and continued pneumothorax s/p 2nd chest tube placement.  6/27: Right     Anaphylaxis 2022    Anemia     Asthma     Chest pain 2021    Chronic pain     Chronic respiratory failure with hypoxia (HCC) 2021    COVID-19 2022    Cystic fibrosis (HCC)     Deep vein thrombosis (HCC)     Diabetes mellitus (HCC)     Fever 2024    GERD (gastroesophageal reflux disease)     HBP (high blood pressure)     HCAP (healthcare-associated pneumonia) 2020    History of transfusion     Influenza B 2020    Kidney stone     Moderate protein-calorie malnutrition (HCC) 2022    Neutropenic fever  (HCC) 2020    Pneumonia due to Pseudomonas species ([facility]) 10/28/2010    Last Assessment & Plan:  Formatting of this note might be different from the original. Polymicrobial pna w/ pseudomonas, M. Abscessus, Cadida glabrata/albicans    PONV (postoperative nausea and vomiting)     Pseudomonas aeruginosa infection 2012    Renal calculus     Squamous cell skin cancer 2023    SCCIS- Left upper thigh    Transplant recipient     Lung- 2017    Ulcerative colitis ([facility])     Wellness examination 2013    Formatting of this note might be different from the original. Discussed with patient and acknowledgement form signed on 2013 (ALYSHA)     Social History     Socioeconomic History    Marital status: /Civil Union     Spouse name: None    Number of children: None    Years of education: 12    Highest education level: None   Occupational History    Occupation: Disabled    Tobacco Use    Smoking status: Former     Current packs/day: 0.00     Average packs/day: 0.5 packs/day for 12.0 years (6.0 ttl pk-yrs)     Types: Cigarettes     Start date: 1993     Quit date: 2005     Years since quittin.3     Passive exposure: Past    Smokeless tobacco: Never   Vaping Use    Vaping status: Never Used   Substance and Sexual Activity    Alcohol use: Yes     Alcohol/week: 1.0 standard drink of alcohol     Types: 1 Glasses of wine per week     Comment: social     Drug use: Yes     Frequency: 2.0 times per week     Types: Marijuana     Comment: medical edible marijuana prescribed    Sexual activity: Yes     Partners: Male     Birth control/protection: None   Other Topics Concern    None   Social History Narrative    Most recent tobacco use screenin2019    Do you currently or have you served in the [employer]: No    Were you activated, into active duty, as a member of the [employer] or as a Reservist: No    Occupation: disabled    Education: 12    Marital status:     Exercise level: Occasional    Diet: Specific    low sugar, high fat    General stress level: High    Alcohol intake: Occasional    Caffeine intake: Heavy    Chewing tobacco: none    Illicit drugs: none    Guns present in home: No    Seat belts used routinely: Yes    Smoke alarm in home: Yes    Advance directive: Yes     Social Drivers of Health     Financial Resource Strain: Low Risk  (11/10/2022)    Received from [facility]    Overall Financial Resource Strain (CARDIA)     Difficulty of Paying Living Expenses: Not hard at all   Food Insecurity: No Food Insecurity (5/15/2025)    Nursing - Inadequate Food Risk Classification     Worried About Running Out of Food in the Last Year: Never true     Ran Out of Food in the Last Year: Never true     Ran Out of Food in the Last Year: Never true   Transportation Needs: No Transportation Needs (5/15/2025)    Nursing - Transportation Risk Classification     Lack of Transportation: Not on file     Lack of Transportation: No   Physical Activity: Insufficiently Active (1/15/2024)    Exercise Vital Sign     Days of Exercise per Week: 7 days     Minutes of Exercise per Session: 20 min   Stress: No Stress Concern Present (1/15/2024)    [employer] - Occupational Stress Questionnaire     Feeling of Stress : Only a little   Social Connections: Unknown (2024)    Received from [facility]     How often do you feel lonely or isolated from those around you? (Adult - for ages 18 years and over): Not on file   Intimate Partner Violence: Unknown (5/15/2025)    Nursing IPS     Feels Physically and Emotionally Safe: Not on file     Physically Hurt by Someone: Not on file     Humiliated or Emotionally Abused by Someone: Not on file     Physically Hurt by Someone: No     Hurt or Threatened by Someone: No   Housing Stability: Unknown (5/15/2025)    Nursing: Inadequate Housing Risk Classification     Has Housing: Not on file     Worried About Losing Housing: Not on file     Unable to Get Utilities: Not on file     Unable to Pay for Housing in the Last Year: No     Has Housin       Subjective         Objective    Physical Exam:   Assessment Type: During-treatment  General Appearance: Awake, Alert  Chest Assessment: Chest expansion symmetrical  Bilateral Breath Sounds: Clear  Cough: None  O2 Device: RA    Vitals:  Blood pressure 128/66, pulse 75, temperature 98.1 °F (36.7 °C), resp. rate 20, height 5' 2\" (1.575 m), weight 51.1 kg (112 lb 10.5 oz), last menstrual period 04/10/2025, SpO2 97%, not currently breastfeeding.          Imaging and other studies: Results Review Statement: No pertinent imaging studies reviewed.    O2 Device: RA     Plan    Respiratory Plan: No distress/Pulmonary history        Resp Comments: Will continue with current therapy   "

## 2025-05-17 NOTE — PLAN OF CARE
Problem: PAIN - ADULT  Goal: Verbalizes/displays adequate comfort level or baseline comfort level  Description: Interventions:  - Encourage patient to monitor pain and request assistance  - Assess pain using appropriate pain scale  - Administer analgesics as ordered based on type and severity of pain and evaluate response  - Implement non-pharmacological measures as appropriate and evaluate response  - Consider cultural and social influences on pain and pain management  - Notify physician/advanced practitioner if interventions unsuccessful or patient reports new pain  - Educate patient/family on pain management process including their role and importance of  reporting pain   - Provide non-pharmacologic/complimentary pain relief interventions  Outcome: Progressing     Problem: INFECTION - ADULT  Goal: Absence or prevention of progression during hospitalization  Description: INTERVENTIONS:  - Assess and monitor for signs and symptoms of infection  - Monitor lab/diagnostic results  - Monitor all insertion sites, i.e. indwelling lines, tubes, and drains  - Monitor endotracheal if appropriate and nasal secretions for changes in amount and color  - [place] appropriate cooling/warming therapies per order  - Administer medications as ordered  - Instruct and encourage patient and family to use good hand hygiene technique  - Identify and instruct in appropriate isolation precautions for identified infection/condition  Outcome: Progressing  Goal: Absence of fever/infection during neutropenic period  Description: INTERVENTIONS:  - Monitor WBC  - Perform strict hand hygiene  - Limit to healthy visitors only  - No plants, dried, fresh or silk flowers with bruce in patient room  Outcome: Progressing     Problem: SAFETY ADULT  Goal: Patient will remain free of falls  Description: INTERVENTIONS:  - Educate patient/family on patient safety including physical limitations  - Instruct patient to call for assistance with activity   -  Consider consulting OT/PT to assist with strengthening/mobility based on AM PAC & JH-HLM score  - Consult OT/PT to assist with strengthening/mobility   - Keep Call bell within reach  - Keep bed low and locked with side rails adjusted as appropriate  - Keep care items and personal belongings within reach  - Initiate and maintain comfort rounds  - Make Fall Risk Sign visible to staff  - Offer Toileting every  Hours, in advance of need  - Initiate/Maintain alarm  - Obtain necessary fall risk management equipment:   - Apply yellow socks and bracelet for high fall risk patients  - Consider moving patient to room near nurses station  Outcome: Progressing  Goal: Maintain or return to baseline ADL function  Description: INTERVENTIONS:  -  Assess patient's ability to carry out ADLs; assess patient's baseline for ADL function and identify physical deficits which impact ability to perform ADLs (bathing, care of mouth/teeth, toileting, grooming, dressing, etc.)  - Assess/evaluate cause of self-care deficits   - Assess range of motion  - Assess patient's mobility; develop plan if impaired  - Assess patient's need for assistive devices and provide as appropriate  - Encourage maximum independence but intervene and supervise when necessary  - Involve family in performance of ADLs  - Assess for home care needs following discharge   - Consider OT consult to assist with ADL evaluation and planning for discharge  - Provide patient education as appropriate  - Monitor functional capacity and physical performance, use of AM PAC & JH-HLM   - Monitor gait, balance and fatigue with ambulation    Outcome: Progressing  Goal: Maintains/Returns to pre admission functional level  Description: INTERVENTIONS:  - Perform AM-PAC 6 Click Basic Mobility/ Daily Activity assessment daily.  - Set and communicate daily mobility goal to care team and patient/family/caregiver.   - Collaborate with rehabilitation services on mobility goals if consulted  -  Perform Range of Motion  times a day.  - Reposition patient every  hours.  - Dangle patient  times a day  - Stand patient  times a day  - Ambulate patient  times a day  - Out of bed to chair  times a day   - Out of bed for meals  times a day  - Out of bed for toileting  - Record patient progress and toleration of activity level   Outcome: Progressing     Problem: DISCHARGE PLANNING  Goal: Discharge to home or other facility with appropriate resources  Description: INTERVENTIONS:  - Identify barriers to discharge w/patient and caregiver  - Arrange for needed discharge resources and transportation as appropriate  - Identify discharge learning needs (meds, wound care, etc.)  - Arrange for interpretive services to assist at discharge as needed  - Refer to Case Management Department for coordinating discharge planning if the patient needs post-hospital services based on physician/advanced practitioner order or complex needs related to functional status, cognitive ability, or social support system  Outcome: Progressing     Problem: Knowledge Deficit  Goal: Patient/family/caregiver demonstrates understanding of disease process, treatment plan, medications, and discharge instructions  Description: Complete learning assessment and assess knowledge base.  Interventions:  - Provide teaching at level of understanding  - Provide teaching via preferred learning methods  Outcome: Progressing     Problem: Nutrition/Hydration-ADULT  Goal: Nutrient/Hydration intake appropriate for improving, restoring or maintaining nutritional needs  Description: Monitor and assess patient's nutrition/hydration status for malnutrition. Collaborate with interdisciplinary team and initiate plan and interventions as ordered.  Monitor patient's weight and dietary intake as ordered or per policy. Utilize nutrition screening tool and intervene as necessary. Determine patient's food preferences and provide high-protein, high-caloric foods as appropriate.      INTERVENTIONS:  - Monitor oral intake, urinary output, labs, and treatment plans  - Assess nutrition and hydration status and recommend course of action  - Evaluate amount of meals eaten  - Assist patient with eating if necessary   - Allow adequate time for meals  - Recommend/ encourage appropriate diets, oral nutritional supplements, and vitamin/mineral supplements  - Order, calculate, and assess calorie counts as needed  - Recommend, monitor, and adjust tube feedings and TPN/PPN based on assessed needs  - Assess need for intravenous fluids  - Provide specific nutrition/hydration education as appropriate  - Include patient/family/caregiver in decisions related to nutrition  Outcome: Progressing     Problem: Prexisting or High Potential for Compromised Skin Integrity  Goal: Skin integrity is maintained or improved  Description: INTERVENTIONS:  - Identify patients at risk for skin breakdown  - Assess and monitor skin integrity including under and around medical devices   - Assess and monitor nutrition and hydration status  - Monitor labs  - Assess for incontinence   - Turn and reposition patient  - Assist with mobility/ambulation  - Relieve pressure over naye prominences   - Avoid friction and shearing  - Provide appropriate hygiene as needed including keeping skin clean and dry  - Evaluate need for skin moisturizer/barrier cream  - Collaborate with interdisciplinary team  - Patient/family teaching  - Consider wound care consult    Assess:  - Review Deandre scale daily  - Clean and moisturize skin every   - Inspect skin when repositioning, toileting, and assisting with ADLS  - Assess under medical devices such as  every   - Assess extremities for adequate circulation and sensation     Bed Management:  - Have minimal linens on bed & keep smooth, unwrinkled  - Change linens as needed when moist or perspiring  - Avoid sitting or lying in one position for more than  hours while in bed?Keep HOB at degrees   -  Toileting:  - Offer bedside commode  - Assess for incontinence every   - Use incontinent care products after each incontinent episode such as     Activity:  - Mobilize patient  times a day  - Encourage activity and walks on unit  - Encourage or provide ROM exercises   - Turn and reposition patient every  Hours  - Use appropriate equipment to lift or move patient in bed  - Instruct/ Assist with weight shifting every  when out of bed in chair  - Consider limitation of chair time  hour intervals    Skin Care:  - Avoid use of baby powder, tape, friction and shearing, hot water or constrictive clothing  - Relieve pressure over bony prominences using   - Do not massage red bony areas    Next Steps:  - Teach patient strategies to minimize risks such as   - Consider consults to  interdisciplinary teams such as   Outcome: Progressing

## 2025-05-17 NOTE — PROGRESS NOTES
Progress Note - Hospitalist   Name: Maximilian Mann 49 y.o. female I MRN: [id number]  Unit/Bed#: [facility] Veridn-01 I Date of Admission: 5/15/2025   Date of Service: 5/17/2025 I Hospital Day: 2    Assessment & Plan  Type 1 diabetes mellitus with diabetic chronic kidney disease, unspecified CKD stage (formerly [facility])  Lab Results   Component Value Date    HGBA1C 9.2 (H) 05/15/2025       Recent Labs     05/16/25  2105 05/17/25  0557 05/17/25  1111 05/17/25  1629   POCGLU 223* 158* 105 111       Blood Sugar Average: Last 72 hrs:  (P) [id number]  Patient has cystic fibrosis associated with diabetes.  On long-acting insulin 10 units daily and aspart sliding scale 3 times daily with meals (approximately 10 units breakfast 8 units lunch and 12 units dinner).    - Lantus 10 units every morning  - lispro 8 units 3 times daily with meals plus sliding scale insulin  - Check blood glucose 3 times daily prior to meals  -Monitor for hyperglycemia in the setting of steroid use, currently increased to 40 mg per transplant doctor at [facility] recommendation  S/P pneumonectomy  Status post right-sided pneumonectomy in 2021  Pancreatic insufficiency  Continue on Creon 63545 units 3 times daily with meals  Asthma  No wheezing on exam today  Continue daily Symbicort  Albuterol inhalation twice daily  Sodium chloride 3% inhalations twice daily for asthma and history of CF per respiratory  Chronic pain disorder  Patient takes oxycodone IR 10 mg every 4 hours as needed outpatient  Will continue current regimen orally  Add breakthrough with 0.5 mg IV Dilaudid every 2 hours as needed  DVT (deep venous thrombosis) (formerly [facility])  History of DVT PE on Eliquis  Continue Eliquis  Anxiety  Ativan 1 mg twice daily as needed  Continue Lexapro  Immunosuppression (formerly [facility])  Patient with systemic immunosuppression on tacrolimus and prednisone  Continue prophylaxis with valacyclovir 500 daily, azithromycin 250 mg 3 times weekly on Monday Wednesday Friday  Atovaquone 1500 mg  daily  Check tacrolimus level  Stage 3a chronic kidney disease (HCC)  Lab Results   Component Value Date    EGFR 61 05/17/2025    EGFR 52 05/16/2025    EGFR 56 05/15/2025    CREATININE 1.08 05/17/2025    CREATININE 1.23 05/16/2025    CREATININE 1.16 05/15/2025   Baseline appears to be 1-1.3  Currently at baseline  Monitor BMP daily   Chronic rejection of lung transplant (HCC)  On tacrolimus 3 mg twice daily  Following hospitals transplant  On chronic prednisone 15 mg daily, recently increased to 40 mg daily per transplant DrMumtaz In setting of increased dyspnea  Shortness of breath  Patient with lung transplant with chronic rejection on tacrolimus and prednisone  Patient was instructed to increase prednisone from 15 mg to 40 mg with taper per hospitals transplant doctor  WBC 6.13K, VBG 7.326/36/39/18.4, , Lactate 3.3 improved to 2.3 s/p fluids, flu/covid rsv negative  CTA chest PE this admission shows mild diffuse bronchiolitis without new findings similar to prior imaging.  Status post right pneumonectomy and left lung transplant.  No signs of PE or overt pneumonia  History of MRSA and Pseudomonas pneumonia in January 2025 treated at hospitals  Treat with linezolid 600 mg IV every 12 hours and pip-tazo extended infusion 4.5 g IV every 8 hours per protocol for suspected pneumonia (discussed with Dr. Gianna Crews from pharmacy)  Pulmonology consulted for comanagement  Continue on tacrolimus 3 mg twice daily  Started on prednisone 40 mg daily with plans for taper  Follow-up blood cultures; Rp2 panel leg  S/p 1 dose lasix 40 mg PO (patient takes PRN) in setting of elevated BNP and dyspnea  Transfer ADT placed through PACS for transfer to [facility], Dr. Wilburn accepting physician at hospitals when bed available.  Lactic acidosis  Blood pressure 130s over 80s at bedside at this time  Elevated lactate 3.3 improved to 2.3 status post 1 L bolus of fluids  Not meeting sepsis criteria at this time  Bronchopneumonia      VTE Pharmacologic  Prophylaxis:   Moderate Risk (Score 3-4) - Pharmacological DVT Prophylaxis Ordered: apixaban (Eliquis).    Mobility:   Basic Mobility Inpatient Raw Score: 21  JH-HLM Goal: 6: Walk 10 steps or more  JH-HLM Achieved: 6: Walk 10 steps or more  JH-HLM Goal achieved. Continue to encourage appropriate mobility.    Patient Centered Rounds: I performed bedside rounds with nursing staff today.   Discussions with Specialists or Other Care Team Provider:     Education and Discussions with Family / Patient:  .     Current Length of Stay: 2 day(s)  Current Patient Status: Inpatient   Certification Statement: The patient will continue to require additional inpatient hospital stay due to SOB  Discharge Plan: transfer    Code Status: Level 1 - Full Code    Subjective   Pt was seen and examined at the [facility]. Pt NAD, hemodynamically stable. Pt is c/o Lf rib pain, which is chronic. Pt otherwise has no other complains.      Objective :  Temp:  [98.1 °F (36.7 °C)-98.3 °F (36.8 °C)] 98.1 °F (36.7 °C)  HR:  [71-83] 71  BP: (128-166)/() 128/66  Resp:  [16-20] 20  SpO2:  [94 %-98 %] 94 %  O2 Device: Nasal cannula  Nasal Cannula O2 Flow Rate (L/min):  [2 L/min] 2 L/min    Body mass index is 20.6 kg/m².     Input and Output Summary (last 24 hours):     Intake/Output Summary (Last 24 hours) at 5/17/2025 1724  Last data filed at 5/17/2025 1601  Gross per 24 hour   Intake 1200 ml   Output 1800 ml   Net -600 ml       Physical Exam  Constitutional:       General: She is not in acute distress.     Appearance: Normal appearance. She is ill-appearing. She is not toxic-appearing or diaphoretic.   HENT:      Head: Normocephalic and atraumatic.      Nose: Nose normal.      Mouth/Throat:      Mouth: Mucous membranes are moist.     Eyes:      Pupils: Pupils are equal, round, and reactive to light.       Cardiovascular:      Rate and Rhythm: Normal rate and regular rhythm.      Pulses: Normal pulses.      Heart sounds: Normal heart sounds. No  murmur heard.     No friction rub. No gallop.   Pulmonary:      Effort: Pulmonary effort is normal. No respiratory distress.      Breath sounds: Normal breath sounds. No stridor. No wheezing, rhonchi or rales.   Chest:      Chest wall: No tenderness.   Abdominal:      General: Abdomen is flat. There is no distension.      Palpations: There is no mass.      Tenderness: There is no abdominal tenderness. There is no guarding or rebound.      Hernia: No hernia is present.     Musculoskeletal:      Cervical back: Neck supple.     Skin:     General: Skin is warm and dry.      Capillary Refill: Capillary refill takes less than 2 seconds.      Coloration: Skin is not jaundiced or pale.      Findings: No bruising, erythema, lesion or rash.     Neurological:      General: No focal deficit present.      Mental Status: She is alert and oriented to person, place, and time. Mental status is at baseline.     Psychiatric:         Mood and Affect: Mood normal.         Behavior: Behavior normal.           Lines/Drains:  Lines/Drains/Airways       Active Status       Name Placement date Placement time Site Days    Port A Cath 03/22/22 Right Subclavian 03/22/22  0900  Subclavian  1152                    Central Line:  Goal for removal:                 Lab Results: I have reviewed the following results:   Results from last 7 days   Lab Units 05/17/25  0538   WBC Thousand/uL 5.00   HEMOGLOBIN g/dL 10.9*   HEMATOCRIT % 34.8   PLATELETS Thousands/uL 138*   SEGS PCT % 59   LYMPHO PCT % 30   MONO PCT % 9   EOS PCT % 1     Results from last 7 days   Lab Units 05/17/25  0538   SODIUM mmol/L 138   POTASSIUM mmol/L 4.3   CHLORIDE mmol/L 104   CO2 mmol/L 28   BUN mg/dL 22   CREATININE mg/dL 1.08   ANION GAP mmol/L 6   CALCIUM mg/dL 8.3*   ALBUMIN g/dL 3.4*   TOTAL BILIRUBIN mg/dL 0.31   ALK PHOS U/L 39   ALT U/L 21   AST U/L 21   GLUCOSE RANDOM mg/dL 162*     Results from last 7 days   Lab Units 05/15/25  0820   INR  1.03     Results from last 7  days   Lab Units 05/17/25  1629 05/17/25  1111 05/17/25  0557 05/16/25  2105 05/16/25  1546 05/16/25  1110 05/16/25  0807 05/16/25  0716 05/15/25  2132 05/15/25  1832 05/15/25  1352   POC GLUCOSE mg/dl 111 105 158* 223* 206* 198* 157* 156* 312* 349* 173*     Results from last 7 days   Lab Units 05/15/25  0820   HEMOGLOBIN A1C % 9.2*     Results from last 7 days   Lab Units 05/15/25  1106 05/15/25  0820 05/10/25  2206   LACTIC ACID mmol/L 2.3* 3.3* 1.3   PROCALCITONIN ng/ml  --  <0.05 <0.05       Recent Cultures (last 7 days):   Results from last 7 days   Lab Units 05/15/25  1008 05/15/25  0857   BLOOD CULTURE  No Growth at 48 hrs. No Growth at 48 hrs.             Last 24 Hours Medication List:     Current Facility-Administered Medications:     acetaminophen (Ofirmev) injection 1,000 mg, Once    acetaminophen (TYLENOL) tablet 975 mg, Q8H DOTTY    albuterol (PROVENTIL HFA,VENTOLIN HFA) inhaler 2 puff, Q4H PRN    albuterol inhalation solution 5 mg, BID    ALPRAZolam (XANAX) tablet 1 mg, BID PRN    apixaban (ELIQUIS) tablet 5 mg, BID    ascorbic acid (VITAMIN C) tablet 500 mg, Daily    atovaquone (MEPRON) oral suspension 1,500 mg, Daily    azithromycin (ZITHROMAX) tablet 250 mg, Once per day on Monday Wednesday Friday    bisacodyl (DULCOLAX) EC tablet 5 mg, HS    budesonide-formoterol (SYMBICORT) 160-4.5 mcg/act inhaler 2 puff, Q12H    calcium carbonate (TUMS) chewable tablet 500 mg, Daily PRN    Cholecalciferol (VITAMIN D3) tablet 5,000 Units, Daily    cyanocobalamin (VITAMIN B-12) tablet 1,000 mcg, Daily    dexlansoprazole (DEXILANT) capsule 60 mg, HS    escitalopram (LEXAPRO) tablet 5 mg, Every Other Day    famotidine (PEPCID) tablet 40 mg, HS    ferrous sulfate tablet 325 mg, Every Other Day    HYDROmorphone (DILAUDID) injection 0.5 mg, Q2H PRN    HYDROmorphone (DILAUDID) injection 1 mg, Q4H PRN    insulin glargine (LANTUS) subcutaneous injection 10 Units 0.1 mL, QAM    insulin lispro (HumALOG/ADMELOG) 100 units/mL  subcutaneous injection 1-5 Units, TID AC **AND** Fingerstick Glucose (POCT), TID AC    insulin lispro (HumALOG/ADMELOG) 100 units/mL subcutaneous injection 8 Units, TID With Meals    linezolid (ZYVOX) IVPB (premix in dextrose) 600 mg 300 mL, Q12H, Last Rate: 600 mg (05/17/25 1411)    magnesium gluconate (MAGONATE) tablet 500 mg, BID    ondansetron (ZOFRAN) injection 4 mg, Q4H PRN    oxybutynin (DITROPAN-XL) 24 hr tablet 15 mg, HS    oxyCODONE (ROXICODONE) immediate release tablet 10 mg, Q4H PRN    pancrelipase (Lip-Prot-Amyl) (CREON) delayed release capsule 48,000 Units, TID With Meals    [COMPLETED] piperacillin-tazobactam (ZOSYN) IVPB 4.5 g, Once **FOLLOWED BY** piperacillin-tazobactam (ZOSYN) IVPB (EXTENDED INFUSION) 4.5 g, Q8H, Last Rate: 4.5 g (05/16/25 0025)    polyethylene glycol (MIRALAX) packet 17 g, BID    predniSONE tablet 40 mg, Daily    [Transfer Hold] sodium chloride 3 % inhalation solution 4 mL, BID    tacrolimus (PROGRAF) capsule 3 mg, BID    tretinoin (RETIN-A) 0.025 % cream 1 Application, HS    valACYclovir (VALTREX) tablet 500 mg, Daily    Administrative Statements   Today, Patient Was Seen By: Sirisha Call MD      **Please Note: This note may have been constructed using a voice recognition system.**

## 2025-05-18 VITALS
HEART RATE: 79 BPM | OXYGEN SATURATION: 97 % | DIASTOLIC BLOOD PRESSURE: 98 MMHG | TEMPERATURE: 98.2 F | RESPIRATION RATE: 16 BRPM | WEIGHT: 112.66 LBS | BODY MASS INDEX: 20.73 KG/M2 | HEIGHT: 62 IN | SYSTOLIC BLOOD PRESSURE: 122 MMHG

## 2025-05-18 LAB
ANION GAP SERPL CALCULATED.3IONS-SCNC: 7 MMOL/L (ref 4–13)
BUN SERPL-MCNC: 22 MG/DL (ref 5–25)
CALCIUM SERPL-MCNC: 9 MG/DL (ref 8.4–10.2)
CHLORIDE SERPL-SCNC: 101 MMOL/L (ref 96–108)
CO2 SERPL-SCNC: 31 MMOL/L (ref 21–32)
CREAT SERPL-MCNC: 1.07 MG/DL (ref 0.6–1.3)
ERYTHROCYTE [DISTWIDTH] IN BLOOD BY AUTOMATED COUNT: 15.8 % (ref 11.6–15.1)
GFR SERPL CREATININE-BSD FRML MDRD: 61 ML/MIN/1.73SQ M
GLUCOSE SERPL-MCNC: 139 MG/DL (ref 65–140)
GLUCOSE SERPL-MCNC: 144 MG/DL (ref 65–140)
HCT VFR BLD AUTO: 38.5 % (ref 34.8–46.1)
HGB BLD-MCNC: 12.3 G/DL (ref 11.5–15.4)
MCH RBC QN AUTO: 29.6 PG (ref 26.8–34.3)
MCHC RBC AUTO-ENTMCNC: 31.9 G/DL (ref 31.4–37.4)
MCV RBC AUTO: 93 FL (ref 82–98)
PLATELET # BLD AUTO: 161 THOUSANDS/UL (ref 149–390)
PMV BLD AUTO: 8.7 FL (ref 8.9–12.7)
POTASSIUM SERPL-SCNC: 4.3 MMOL/L (ref 3.5–5.3)
RBC # BLD AUTO: 4.15 MILLION/UL (ref 3.81–5.12)
SODIUM SERPL-SCNC: 139 MMOL/L (ref 135–147)
WBC # BLD AUTO: 7.39 THOUSAND/UL (ref 4.31–10.16)

## 2025-05-18 PROCEDURE — 82948 REAGENT STRIP/BLOOD GLUCOSE: CPT

## 2025-05-18 PROCEDURE — 85027 COMPLETE CBC AUTOMATED: CPT | Performed by: INTERNAL MEDICINE

## 2025-05-18 PROCEDURE — 99239 HOSP IP/OBS DSCHRG MGMT >30: CPT | Performed by: INTERNAL MEDICINE

## 2025-05-18 PROCEDURE — 80048 BASIC METABOLIC PNL TOTAL CA: CPT | Performed by: INTERNAL MEDICINE

## 2025-05-18 RX ADMIN — OXYCODONE HYDROCHLORIDE 10 MG: 10 TABLET ORAL at 06:04

## 2025-05-18 RX ADMIN — HYDROMORPHONE HYDROCHLORIDE 1 MG: 1 INJECTION, SOLUTION INTRAMUSCULAR; INTRAVENOUS; SUBCUTANEOUS at 06:42

## 2025-05-18 RX ADMIN — HYDROMORPHONE HYDROCHLORIDE 0.5 MG: 1 INJECTION, SOLUTION INTRAMUSCULAR; INTRAVENOUS; SUBCUTANEOUS at 03:30

## 2025-05-18 RX ADMIN — ACETAMINOPHEN 975 MG: 325 TABLET ORAL at 06:04

## 2025-05-18 RX ADMIN — BUDESONIDE AND FORMOTEROL FUMARATE DIHYDRATE 2 PUFF: 160; 4.5 AEROSOL RESPIRATORY (INHALATION) at 00:39

## 2025-05-18 RX ADMIN — Medication 4.5 G: at 01:36

## 2025-05-18 RX ADMIN — ALPRAZOLAM 1 MG: 0.5 TABLET ORAL at 07:01

## 2025-05-18 RX ADMIN — HYDROMORPHONE HYDROCHLORIDE 1 MG: 1 INJECTION, SOLUTION INTRAMUSCULAR; INTRAVENOUS; SUBCUTANEOUS at 00:39

## 2025-05-18 NOTE — ASSESSMENT & PLAN NOTE
"Lab Results   Component Value Date    HGBA1C 7.8 (H) 12/16/2024       No results for input(s): \"POCGLU\" in the last 72 hours.    Blood Sugar Average: Last 72 hrs:    Patient has cystic fibrosis associated with diabetes.  On long-acting insulin 10 units daily and aspart sliding scale 3 times daily with meals (approximately 10 units breakfast 8 units lunch and 12 units dinner).    -Start Lantus 10 units every morning  -Start lispro 8 units 3 times daily with meals plus sliding scale insulin  - Check blood glucose 3 times daily prior to meals  -Monitor for hyperglycemia in the setting of steroid use, currently increased to 40 mg per transplant doctor at [facility] recommendation  "
Ativan 1 mg twice daily as needed  Continue Lexapro  
Blood pressure 130s over 80s at bedside at this time  Elevated lactate 3.3 improved to 2.3 status post 1 L bolus of fluids  Not meeting sepsis criteria at this time  
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  
Continue on Creon 00157 units 3 times daily with meals  
Continue on Creon 44223 units 3 times daily with meals  
Continue on Creon 62279 units 3 times daily with meals  
Continue on Creon 67200 units 3 times daily with meals  
Diffuse bronchiolitis on the left lung, even though her white cell count is normal and given her immune suppression and the lactic acidosis were not treat her with the antibiotics  
Diffuse bronchiolitis on the left lung, even though her white cell count is normal and given her immune suppression and the lactic acidosis would continue antibiotics  Procal is normal, no leukocytosis  She is awaiting transfer to [facility]  
History of DVT PE on Eliquis  Continue Eliquis  
Lab Results   Component Value Date    EGFR 52 05/16/2025    EGFR 56 05/15/2025    EGFR 42 05/10/2025    CREATININE 1.23 05/16/2025    CREATININE 1.16 05/15/2025    CREATININE 1.46 (H) 05/10/2025   Baseline appears to be 1-1.3  Currently at baseline  Monitor BMP daily   
Lab Results   Component Value Date    EGFR 56 05/15/2025    EGFR 42 05/10/2025    EGFR 43 (L) 01/30/2025    CREATININE 1.16 05/15/2025    CREATININE 1.46 (H) 05/10/2025    CREATININE 1.14 (H) 02/10/2025     
Lab Results   Component Value Date    EGFR 56 05/15/2025    EGFR 42 05/10/2025    EGFR 43 (L) 01/30/2025    CREATININE 1.16 05/15/2025    CREATININE 1.46 (H) 05/10/2025    CREATININE 1.14 (H) 02/10/2025   Baseline appears to be 1-1.3  Currently at baseline  Monitor BMP daily   
Lab Results   Component Value Date    EGFR 61 05/17/2025    EGFR 52 05/16/2025    EGFR 56 05/15/2025    CREATININE 1.08 05/17/2025    CREATININE 1.23 05/16/2025    CREATININE 1.16 05/15/2025   Baseline appears to be 1-1.3  Currently at baseline  Monitor BMP daily   
Lab Results   Component Value Date    EGFR 61 05/18/2025    EGFR 61 05/17/2025    EGFR 52 05/16/2025    CREATININE 1.07 05/18/2025    CREATININE 1.08 05/17/2025    CREATININE 1.23 05/16/2025   Baseline appears to be 1-1.3  Currently at baseline  Monitor BMP daily   
Lab Results   Component Value Date    HGBA1C 7.8 (H) 12/16/2024       Recent Labs     05/15/25  1352   POCGLU 173*       Blood Sugar Average: Last 72 hrs:  (P) 173    
Lab Results   Component Value Date    HGBA1C 9.2 (H) 05/15/2025       Recent Labs     05/15/25  2132 05/16/25  0716 05/16/25  0807 05/16/25  1110   POCGLU 312* 156* 157* 198*       Blood Sugar Average: Last 72 hrs:  (P) 224.0587559084930166  Patient has cystic fibrosis associated with diabetes.  On long-acting insulin 10 units daily and aspart sliding scale 3 times daily with meals (approximately 10 units breakfast 8 units lunch and 12 units dinner).    - Lantus 10 units every morning  - lispro 8 units 3 times daily with meals plus sliding scale insulin  - Check blood glucose 3 times daily prior to meals  -Monitor for hyperglycemia in the setting of steroid use, currently increased to 40 mg per transplant doctor at [facility] recommendation  
Lab Results   Component Value Date    HGBA1C 9.2 (H) 05/15/2025       Recent Labs     05/16/25  2105 05/17/25  0557 05/17/25  1111 05/17/25  1629   POCGLU 223* 158* 105 111       Blood Sugar Average: Last 72 hrs:  (P) 195.9345633862770006  Patient has cystic fibrosis associated with diabetes.  On long-acting insulin 10 units daily and aspart sliding scale 3 times daily with meals (approximately 10 units breakfast 8 units lunch and 12 units dinner).    - Lantus 10 units every morning  - lispro 8 units 3 times daily with meals plus sliding scale insulin  - Check blood glucose 3 times daily prior to meals  -Monitor for hyperglycemia in the setting of steroid use, currently increased to 40 mg per transplant doctor at [facility] recommendation  
Lab Results   Component Value Date    HGBA1C 9.2 (H) 05/15/2025       Recent Labs     05/17/25  1111 05/17/25  1629 05/17/25  2119 05/18/25  0613   POCGLU 105 111 182* 139       Blood Sugar Average: Last 72 hrs:  (P) 189.9354435621431618  Patient has cystic fibrosis associated with diabetes.  On long-acting insulin 10 units daily and aspart sliding scale 3 times daily with meals (approximately 10 units breakfast 8 units lunch and 12 units dinner).    - Lantus 10 units every morning  - lispro 8 units 3 times daily with meals plus sliding scale insulin  - Check blood glucose 3 times daily prior to meals  -Monitor for hyperglycemia in the setting of steroid use, currently increased to 40 mg per transplant doctor at [facility] recommendation  
Mildly elevated lactic acidosis, ?  Bronchopneumonia  
Mildly elevated lactic acidosis, ?  Bronchopneumonia  
No wheezing on exam today  Continue daily Symbicort  Albuterol inhalation twice daily  Sodium chloride 3% inhalations twice daily for asthma and history of CF per respiratory  
On tacrolimus 3 mg twice daily  Following [facility] transplant  On chronic prednisone 15 mg daily, recently increased to 40 mg daily per transplant  In setting of increased dyspnea  
Patient takes oxycodone IR 10 mg every 4 hours as needed outpatient  Will continue current regimen orally  Add breakthrough with 0.5 mg IV Dilaudid every 2 hours as needed  
Patient with lung transplant with chronic rejection on tacrolimus and prednisone  Patient was instructed to increase prednisone from 15 mg to 40 mg with taper per [facility] transplant doctor  WBC 6.13K, VBG 7.326/36/39/18.4, , Lactate 3.3 improved to 2.3 s/p fluids, flu/covid rsv negative  CTA chest PE this admission shows mild diffuse bronchiolitis without new findings similar to prior imaging.  Status post right pneumonectomy and left lung transplant.  No signs of PE or overt pneumonia  History of MRSA and Pseudomonas pneumonia in January 2025 treated at [facility]  Treat with linezolid 600 mg IV every 12 hours and pip-tazo extended infusion 4.5 g IV every 8 hours per protocol for suspected pneumonia (discussed with Dr. Gianna Crews from pharmacy)  Pulmonology consulted for comanagement  Continue on tacrolimus 3 mg twice daily  Started on prednisone 40 mg daily with plans for taper  Follow-up blood cultures; Rp2 panel leg  S/p 1 dose lasix 40 mg PO (patient takes PRN) in setting of elevated BNP and dyspnea  Transfer ADT placed through PACS for transfer to [facility], Dr. Wilburn accepting physician at [facility] when bed available.  
Patient with lung transplant with chronic rejection on tacrolimus and prednisone  Patient was instructed to increase prednisone from 15 mg to 40 mg with taper per [facility] transplant doctor  WBC 6.13K, VBG 7.326/36/39/18.4, , Lactate 3.3 improved to 2.3 s/p fluids, flu/covid rsv negative  CTA chest PE this admission shows mild diffuse bronchiolitis without new findings similar to prior imaging.  Status post right pneumonectomy and left lung transplant.  No signs of PE or overt pneumonia  History of MRSA and Pseudomonas pneumonia in January 2025 treated at [facility]  Treat with linezolid 600 mg IV every 12 hours and pip-tazo extended infusion 4.5 g IV every 8 hours per protocol for suspected pneumonia (discussed with Dr. Gianna Cresw from pharmacy)  Pulmonology consulted for comanagement  Continue on tacrolimus 3 mg twice daily  Started on prednisone 40 mg daily with plans for taper  Follow-up blood cultures; Check RP 2 panel  Will give 1 dose lasix 40 mg PO (patient takes PRN) in setting of elevated BNP and dyspnea  Transfer ADT placed through PACS for transfer to [facility], Dr. Wilburn accepting physician at [facility] when bed available.  
Patient with lung transplant with chronic rejection on tacrolimus and prednisone  Patient was instructed to increase prednisone from 15 mg to 40 mg with taper per [facility] transplant doctor  WBC 6.13K, VBG 7.326/36/39/18.4, , Lactate 3.3 improved to 2.3 s/p fluids, flu/covid rsv negative  CTA chest PE this admission shows mild diffuse bronchiolitis without new findings similar to prior imaging.  Status post right pneumonectomy and left lung transplant.  No signs of PE or overt pneumonia  History of MRSA and Pseudomonas pneumonia in January 2025 treated at [facility]  Treat with linezolid 600 mg IV every 12 hours and pip-tazo extended infusion 4.5 g IV every 8 hours per protocol for suspected pneumonia (discussed with Dr. Gianna Crews from pharmacy)  Pulmonology consulted for comanagement  Continue on tacrolimus 3 mg twice daily  Started on prednisone 40 mg daily with plans for taper  Follow-up blood cultures; Rp2 panel leg  S/p 1 dose lasix 40 mg PO (patient takes PRN) in setting of elevated BNP and dyspnea  Transfer ADT placed through PACS for transfer to [facility], Dr. Wilburn accepting physician at [facility] when bed available.  
Patient with lung transplant with chronic rejection on tacrolimus and prednisone  Patient was instructed to increase prednisone from 15 mg to 40 mg with taper per \[facility]\"" transplant doctor  WBC 6.13K, VBG 7.326/36/39/18.4, , Lactate 3.3 improved to 2.3 s/p fluids, flu/covid rsv negative  CTA chest PE this admission shows mild diffuse bronchiolitis without new findings similar to prior imaging.  Status post right pneumonectomy and left lung transplant.  No signs of PE or overt pneumonia  History of MRSA and Pseudomonas pneumonia in January 2025 treated at \[facility]\""  Treat with linezolid 600 mg IV every 12 hours and pip-tazo extended infusion 4.5 g IV every 8 hours per protocol for suspected pneumonia (discussed with Dr. Gianna Crews from pharmacy)  Pulmonology consulted for comanagement  Continue on tacrolimus 3 mg twice daily  Started on prednisone 40 mg daily with plans for taper  Follow-up blood cultures; Rp2 panel leg  S/p 1 dose lasix 40 mg PO (patient takes PRN) in setting of elevated BNP and dyspnea  Transfer ADT placed through PACS for transfer to [facility], Dr. Wilburn accepting physician at \[facility]\"" when bed available.  
Patient with systemic immunosuppression on tacrolimus and prednisone  Continue prophylaxis with valacyclovir 500 daily, azithromycin 250 mg 3 times weekly on Monday Wednesday Friday  Atovaquone 1500 mg daily  Check tacrolimus level  
Shortness of breath worsening in the past few months with recent COVID-19 pneumonia in January 2025 after which she states her FEV1 which was around 40% in January 2025 came down to 15%  Rule out bronchiolitis/bronchopneumonia in the left lung infiltrates also given lactic acidosis  Antibiotics on linezolid and Zosyn with prior microbiology cultures after discussion with transplant center by the ED team  She was on 15 mg of prednisone on maintenance dose, she has increased it to 20 mg in the past week to 10 days given her shortness of breath yesterday transplant team had asked her to be on 40 mg will recommend to be on the 40 mg at least for the next 5 days until she is being transferred to the [facility], and it can be tapered down as able to her baseline whether it is 15 or 20 mg as able by the transplant team  Continue with her nebulizer treatments around-the-clock along with the Symbicort  
Shortness of breath worsening in the past few months with recent COVID-19 pneumonia in January 2025 after which she states her FEV1 which was around 40% in January 2025 came down to 15%  Rule out bronchiolitis/bronchopneumonia in the left lung infiltrates also given lactic acidosis  Currently on 3L NC with sats in the high 90s    Antibiotics on linezolid and Zosyn with prior microbiology cultures after discussion with transplant center by the ED team  She was on 15 mg of prednisone on maintenance dose, she has increased it to 20 mg in the past week to 10 days given her shortness of breath yesterday transplant team had asked her to be on 40 mg will recommend to be on the 40 mg at least for the next 5 days until she is being transferred to the [facility], and it can be tapered down as able to her baseline whether it is 15 or 20 mg as able by the transplant team  Continue with her nebulizer treatments around-the-clock along with the Symbicort  
Status post right pneumonectomy in October 2021  
Status post right pneumonectomy in October 2021  
Status post right-sided pneumonectomy in 2021  
None

## 2025-05-18 NOTE — PLAN OF CARE
Problem: PAIN - ADULT  Goal: Verbalizes/displays adequate comfort level or baseline comfort level  Description: Interventions:  - Encourage patient to monitor pain and request assistance  - Assess pain using appropriate pain scale  - Administer analgesics as ordered based on type and severity of pain and evaluate response  - Implement non-pharmacological measures as appropriate and evaluate response  - Consider cultural and social influences on pain and pain management  - Notify physician/advanced practitioner if interventions unsuccessful or patient reports new pain  - Educate patient/family on pain management process including their role and importance of  reporting pain   - Provide non-pharmacologic/complimentary pain relief interventions  Outcome: Progressing     Problem: INFECTION - ADULT  Goal: Absence or prevention of progression during hospitalization  Description: INTERVENTIONS:  - Assess and monitor for signs and symptoms of infection  - Monitor lab/diagnostic results  - Monitor all insertion sites, i.e. indwelling lines, tubes, and drains  - Monitor endotracheal if appropriate and nasal secretions for changes in amount and color  - [place] appropriate cooling/warming therapies per order  - Administer medications as ordered  - Instruct and encourage patient and family to use good hand hygiene technique  - Identify and instruct in appropriate isolation precautions for identified infection/condition  Outcome: Progressing  Goal: Absence of fever/infection during neutropenic period  Description: INTERVENTIONS:  - Monitor WBC  - Perform strict hand hygiene  - Limit to healthy visitors only  - No plants, dried, fresh or silk flowers with bruce in patient room  Outcome: Progressing     Problem: Knowledge Deficit  Goal: Patient/family/caregiver demonstrates understanding of disease process, treatment plan, medications, and discharge instructions  Description: Complete learning assessment and assess knowledge  base.  Interventions:  - Provide teaching at level of understanding  - Provide teaching via preferred learning methods  Outcome: Progressing     Problem: Nutrition/Hydration-ADULT  Goal: Nutrient/Hydration intake appropriate for improving, restoring or maintaining nutritional needs  Description: Monitor and assess patient's nutrition/hydration status for malnutrition. Collaborate with interdisciplinary team and initiate plan and interventions as ordered.  Monitor patient's weight and dietary intake as ordered or per policy. Utilize nutrition screening tool and intervene as necessary. Determine patient's food preferences and provide high-protein, high-caloric foods as appropriate.     INTERVENTIONS:  - Monitor oral intake, urinary output, labs, and treatment plans  - Assess nutrition and hydration status and recommend course of action  - Evaluate amount of meals eaten  - Assist patient with eating if necessary   - Allow adequate time for meals  - Recommend/ encourage appropriate diets, oral nutritional supplements, and vitamin/mineral supplements  - Order, calculate, and assess calorie counts as needed  - Recommend, monitor, and adjust tube feedings and TPN/PPN based on assessed needs  - Assess need for intravenous fluids  - Provide specific nutrition/hydration education as appropriate  - Include patient/family/caregiver in decisions related to nutrition  Outcome: Progressing

## 2025-05-18 NOTE — DISCHARGE SUMMARY
Discharge Summary - Hospitalist   Name: Maximilian Mann 49 y.o. female I MRN: [id number]  Unit/Bed#: [facility]-01 I Date of Admission: 5/15/2025   Date of Service: 5/18/2025 I Hospital Day: 3     Assessment & Plan  Type 1 diabetes mellitus with diabetic chronic kidney disease, unspecified CKD stage ([facility])  Lab Results   Component Value Date    HGBA1C 9.2 (H) 05/15/2025       Recent Labs     05/17/25  1111 05/17/25  1629 05/17/25  2119 05/18/25  0613   POCGLU 105 111 182* 139       Blood Sugar Average: Last 72 hrs:  (P) 189.3739844337769511  Patient has cystic fibrosis associated with diabetes.  On long-acting insulin 10 units daily and aspart sliding scale 3 times daily with meals (approximately 10 units breakfast 8 units lunch and 12 units dinner).    - Lantus 10 units every morning  - lispro 8 units 3 times daily with meals plus sliding scale insulin  - Check blood glucose 3 times daily prior to meals  -Monitor for hyperglycemia in the setting of steroid use, currently increased to 40 mg per transplant doctor at [facility] recommendation  S/P pneumonectomy  Status post right-sided pneumonectomy in 2021  Pancreatic insufficiency  Continue on Creon 34804 units 3 times daily with meals  Asthma  No wheezing on exam today  Continue daily Symbicort  Albuterol inhalation twice daily  Sodium chloride 3% inhalations twice daily for asthma and history of CF per respiratory  Chronic pain disorder  Patient takes oxycodone IR 10 mg every 4 hours as needed outpatient  Will continue current regimen orally  Add breakthrough with 0.5 mg IV Dilaudid every 2 hours as needed  DVT (deep venous thrombosis) ([facility])  History of DVT PE on Eliquis  Continue Eliquis  Anxiety  Ativan 1 mg twice daily as needed  Continue Lexapro  Immunosuppression ([facility])  Patient with systemic immunosuppression on tacrolimus and prednisone  Continue prophylaxis with valacyclovir 500 daily, azithromycin 250 mg 3 times weekly on Monday Wednesday Friday  Atovaquone  1500 mg daily  Check tacrolimus level  Stage 3a chronic kidney disease (HCC)  Lab Results   Component Value Date    EGFR 61 05/18/2025    EGFR 61 05/17/2025    EGFR 52 05/16/2025    CREATININE 1.07 05/18/2025    CREATININE 1.08 05/17/2025    CREATININE 1.23 05/16/2025   Baseline appears to be 1-1.3  Currently at baseline  Monitor BMP daily   Chronic rejection of lung transplant (HCC)  On tacrolimus 3 mg twice daily  Following [facility] transplant  On chronic prednisone 15 mg daily, recently increased to 40 mg daily per transplant DrMumtaz In setting of increased dyspnea  Shortness of breath  Patient with lung transplant with chronic rejection on tacrolimus and prednisone  Patient was instructed to increase prednisone from 15 mg to 40 mg with taper per [facility] transplant doctor  WBC 6.13K, VBG 7.326/36/39/18.4, , Lactate 3.3 improved to 2.3 s/p fluids, flu/covid rsv negative  CTA chest PE this admission shows mild diffuse bronchiolitis without new findings similar to prior imaging.  Status post right pneumonectomy and left lung transplant.  No signs of PE or overt pneumonia  History of MRSA and Pseudomonas pneumonia in January 2025 treated at [facility]  Treat with linezolid 600 mg IV every 12 hours and pip-tazo extended infusion 4.5 g IV every 8 hours per protocol for suspected pneumonia (discussed with Dr. Gianna Crews from pharmacy)  Pulmonology consulted for comanagement  Continue on tacrolimus 3 mg twice daily  Started on prednisone 40 mg daily with plans for taper  Follow-up blood cultures; Rp2 panel leg  S/p 1 dose lasix 40 mg PO (patient takes PRN) in setting of elevated BNP and dyspnea  Transfer ADT placed through PACS for transfer to [facility], Dr. Wilburn accepting physician at [facility] when bed available.  Lactic acidosis  Blood pressure 130s over 80s at bedside at this time  Elevated lactate 3.3 improved to 2.3 status post 1 L bolus of fluids  Not meeting sepsis criteria at this time  Bronchopneumonia       Medical  Problems       Resolved Problems  Date Reviewed: 12/19/2024   None       Discharging Physician / Practitioner: Sirisha Call MD  PCP: Solis Avila MD  Admission Date:   Admission Orders (From admission, onward)       Ordered        05/15/25 1054  INPATIENT ADMISSION  Once                          Discharge Date: 05/18/25    Consultations During Hospital Stay:  Pulmonology    Procedures Performed:       Significant Findings / Test Results:       Incidental Findings:          Test Results Pending at Discharge (will require follow up):        Outpatient Tests Requested:      Complications:      Reason for Admission: Shortness of breath    Hospital Course:   Maximilian Mann is a 49 y.o. female patient who originally presented to the hospital on 5/15/2025 due to shortness of breath.  Patient has a history of lung transplant with chronic rejection on tacrolimus and prednisone. CTA chest PE this admission showed mild diffuse bronchiolitis without new findings similar to prior imaging. Status post right pneumonectomy and left lung transplant. No signs of PE or overt pneumonia .  Due to history of MRSA and Pseudomonas in January 2025 patient was treated with linezolid and pip-tazo .  During the hospital stay patient was seen by pulmonology, continue on oral prednisone.  Patient was transferred to [facility], Dr. Wilburn accepting physician.          Please see above list of diagnoses and related plan for additional information.     Condition at Discharge: fair    Discharge Day Visit / Exam:   * Please refer to separate progress note for these details *    Discussion with Family:  .     Discharge instructions/Information to patient and family:   See after visit summary for information provided to patient and family.      Provisions for Follow-Up Care:  See after visit summary for information related to follow-up care and any pertinent home health orders.      Mobility at time of Discharge:   Basic Mobility  Inpatient Raw Score: 21  JH-HLM Goal: 6: Walk 10 steps or more  JH-HLM Achieved: 6: Walk 10 steps or more       Disposition:   Acute Care Hospital Transfer to [facility]    Planned Readmission:     Discharge Medications:  See after visit summary for reconciled discharge medications provided to patient and/or family.      Administrative Statements   Discharge Statement:  I have spent a total time of 45 minutes in caring for this patient on the day of the visit/encounter. .    **Please Note: This note may have been constructed using a voice recognition system**

## 2025-05-19 NOTE — UTILIZATION REVIEW
NOTIFICATION OF ADMISSION DISCHARGE   This is a Notification of Discharge from [facility]. Please be advised that this patient has been discharge from our facility. Below you will find the admission and discharge date and time including the patient’s disposition.   UTILIZATION REVIEW CONTACT:  Utilization Review Assistants  Network Utilization Review Department  Phone: [phone] x carefully listen to the prompts. All voicemails are confidential.  Email: [email or web address]     ADMISSION INFORMATION  PRESENTATION DATE: 5/15/2025  7:29 AM  OBERVATION ADMISSION DATE: N/A  INPATIENT ADMISSION DATE: 5/15/25 10:54 AM   DISCHARGE DATE: 5/18/2025  9:18 AM   DISPOSITION:Non [facility] Acute Care/Short Term Hosp    Network Utilization Review Department  ATTENTION: Please call with any questions or concerns to [phone] and carefully listen to the prompts so that you are directed to the right person. All voicemails are confidential.   For Discharge needs, contact Care Management DC Support Team at [phone] opt. [phone]  Send all requests for admission clinical reviews, approved or denied determinations and any other requests to dedicated fax number below belonging to the campus where the patient is receiving treatment. List of dedicated fax numbers for the Facilities:  FACILITY NAME UR FAX NUMBER   ADMISSION DENIALS (Administrative/Medical Necessity) [phone]   DISCHARGE SUPPORT TEAM ([facility]) [phone]   PARENT CHILD HEALTH (Maternity/NICU/Pediatrics) [phone]   [facility] [phone]   [facility] [phone]   [facility] [phone]   [facility] [phone]   [facility] [phone]   [facility] [phone]   [facility] [phone]   [facility]  [phone]   [facility] [phone]   [facility] [phone]   [facility] [phone]   [facility] [phone]

## 2025-05-20 ENCOUNTER — TRANSITIONAL CARE MANAGEMENT (OUTPATIENT)
Dept: INTERNAL MEDICINE CLINIC | Facility: CLINIC | Age: 50
End: 2025-05-20

## 2025-05-20 ENCOUNTER — TELEPHONE (OUTPATIENT)
Dept: INTERNAL MEDICINE CLINIC | Facility: CLINIC | Age: 50
End: 2025-05-20

## 2025-05-20 LAB
BACTERIA BLD CULT: NORMAL
BACTERIA BLD CULT: NORMAL

## 2025-05-22 DIAGNOSIS — G89.4 CHRONIC PAIN SYNDROME: ICD-10-CM

## 2025-05-22 RX ORDER — OXYCODONE HYDROCHLORIDE 10 MG/1
10 TABLET ORAL EVERY 4 HOURS PRN
Qty: 180 TABLET | Refills: 0 | Status: CANCELLED | OUTPATIENT
Start: 2025-05-22

## 2025-05-22 NOTE — TELEPHONE ENCOUNTER
Medication: oxyCODONE (ROXICODONE) 10 MG TABS     Dose/Frequency:  Take 1 tablet (10 mg total) by mouth every 4 (four) hours as needed for moderate pain Max Daily Amount: 60 mg,     Quantity: 180    Pharmacy:  [facility]pharm[facility] #[id number] - [place]      Office:   [x] PCP/Provider -   [] Speciality/Provider -     Does the patient have enough for 3 days?   [x] Yes   [] No - Send as HP to POD

## 2025-05-27 DIAGNOSIS — G89.4 CHRONIC PAIN SYNDROME: ICD-10-CM

## 2025-05-27 RX ORDER — OXYCODONE HYDROCHLORIDE 10 MG/1
10 TABLET ORAL EVERY 4 HOURS PRN
Qty: 180 TABLET | Refills: 0 | Status: SHIPPED | OUTPATIENT
Start: 2025-05-27

## 2025-06-04 ENCOUNTER — APPOINTMENT (EMERGENCY)
Dept: CT IMAGING | Facility: HOSPITAL | Age: 50
End: 2025-06-04
Payer: COMMERCIAL

## 2025-06-04 ENCOUNTER — HOSPITAL ENCOUNTER (EMERGENCY)
Facility: HOSPITAL | Age: 50
Discharge: ANOTHER HOSPITAL | End: 2025-06-04
Attending: EMERGENCY MEDICINE
Payer: COMMERCIAL

## 2025-06-04 VITALS
RESPIRATION RATE: 15 BRPM | SYSTOLIC BLOOD PRESSURE: 119 MMHG | HEART RATE: 78 BPM | DIASTOLIC BLOOD PRESSURE: 75 MMHG | OXYGEN SATURATION: 97 % | TEMPERATURE: 98.1 F

## 2025-06-04 DIAGNOSIS — Z94.2 HISTORY OF LUNG TRANSPLANT (HCC): ICD-10-CM

## 2025-06-04 DIAGNOSIS — J18.9 PNEUMONIA: Primary | ICD-10-CM

## 2025-06-04 LAB
2HR DELTA HS TROPONIN: 0 NG/L
4HR DELTA HS TROPONIN: 1 NG/L
ALBUMIN SERPL BCG-MCNC: 3.5 G/DL (ref 3.5–5)
ALP SERPL-CCNC: 55 U/L (ref 34–104)
ALT SERPL W P-5'-P-CCNC: 17 U/L (ref 7–52)
ANION GAP SERPL CALCULATED.3IONS-SCNC: 7 MMOL/L (ref 4–13)
AST SERPL W P-5'-P-CCNC: 15 U/L (ref 13–39)
ATRIAL RATE: 95 BPM
BASE EX.OXY STD BLDV CALC-SCNC: 80.4 % (ref 60–80)
BASE EXCESS BLDV CALC-SCNC: 2 MMOL/L
BASOPHILS # BLD AUTO: 0.02 THOUSANDS/ÂΜL (ref 0–0.1)
BASOPHILS NFR BLD AUTO: 0 % (ref 0–1)
BILIRUB SERPL-MCNC: 0.33 MG/DL (ref 0.2–1)
BNP SERPL-MCNC: 365 PG/ML (ref 0–100)
BUN SERPL-MCNC: 46 MG/DL (ref 5–25)
CALCIUM SERPL-MCNC: 9.3 MG/DL (ref 8.4–10.2)
CARDIAC TROPONIN I PNL SERPL HS: 20 NG/L (ref ?–50)
CARDIAC TROPONIN I PNL SERPL HS: 20 NG/L (ref ?–50)
CARDIAC TROPONIN I PNL SERPL HS: 21 NG/L (ref ?–50)
CHLORIDE SERPL-SCNC: 101 MMOL/L (ref 96–108)
CO2 SERPL-SCNC: 31 MMOL/L (ref 21–32)
CREAT SERPL-MCNC: 1.07 MG/DL (ref 0.6–1.3)
EOSINOPHIL # BLD AUTO: 0.05 THOUSAND/ÂΜL (ref 0–0.61)
EOSINOPHIL NFR BLD AUTO: 1 % (ref 0–6)
ERYTHROCYTE [DISTWIDTH] IN BLOOD BY AUTOMATED COUNT: 16.6 % (ref 11.6–15.1)
FLUAV AG UPPER RESP QL IA.RAPID: NEGATIVE
FLUBV AG UPPER RESP QL IA.RAPID: NEGATIVE
GFR SERPL CREATININE-BSD FRML MDRD: 61 ML/MIN/1.73SQ M
GLUCOSE SERPL-MCNC: 64 MG/DL (ref 65–140)
HCO3 BLDV-SCNC: 27.4 MMOL/L (ref 24–30)
HCT VFR BLD AUTO: 38.3 % (ref 34.8–46.1)
HGB BLD-MCNC: 12 G/DL (ref 11.5–15.4)
IMM GRANULOCYTES # BLD AUTO: 0.06 THOUSAND/UL (ref 0–0.2)
IMM GRANULOCYTES NFR BLD AUTO: 1 % (ref 0–2)
LACTATE SERPL-SCNC: 1 MMOL/L (ref 0.5–2)
LYMPHOCYTES # BLD AUTO: 0.62 THOUSANDS/ÂΜL (ref 0.6–4.47)
LYMPHOCYTES NFR BLD AUTO: 7 % (ref 14–44)
MCH RBC QN AUTO: 30.4 PG (ref 26.8–34.3)
MCHC RBC AUTO-ENTMCNC: 31.3 G/DL (ref 31.4–37.4)
MCV RBC AUTO: 97 FL (ref 82–98)
MONOCYTES # BLD AUTO: 0.56 THOUSAND/ÂΜL (ref 0.17–1.22)
MONOCYTES NFR BLD AUTO: 6 % (ref 4–12)
NEUTROPHILS # BLD AUTO: 7.63 THOUSANDS/ÂΜL (ref 1.85–7.62)
NEUTS SEG NFR BLD AUTO: 85 % (ref 43–75)
NRBC BLD AUTO-RTO: 0 /100 WBCS
O2 CT BLDV-SCNC: 14.5 ML/DL
P AXIS: 66 DEGREES
PCO2 BLDV: 45.7 MM HG (ref 42–50)
PH BLDV: 7.39 [PH] (ref 7.3–7.4)
PLATELET # BLD AUTO: 165 THOUSANDS/UL (ref 149–390)
PMV BLD AUTO: 9.7 FL (ref 8.9–12.7)
PO2 BLDV: 49.3 MM HG (ref 35–45)
POTASSIUM SERPL-SCNC: 4.4 MMOL/L (ref 3.5–5.3)
PR INTERVAL: 138 MS
PROCALCITONIN SERPL-MCNC: 0.13 NG/ML
PROT SERPL-MCNC: 6.3 G/DL (ref 6.4–8.4)
QRS AXIS: 71 DEGREES
QRSD INTERVAL: 68 MS
QT INTERVAL: 342 MS
QTC INTERVAL: 429 MS
RBC # BLD AUTO: 3.95 MILLION/UL (ref 3.81–5.12)
SARS-COV+SARS-COV-2 AG RESP QL IA.RAPID: NEGATIVE
SODIUM SERPL-SCNC: 139 MMOL/L (ref 135–147)
T WAVE AXIS: 66 DEGREES
VENTRICULAR RATE: 95 BPM
WBC # BLD AUTO: 8.94 THOUSAND/UL (ref 4.31–10.16)

## 2025-06-04 PROCEDURE — 99285 EMERGENCY DEPT VISIT HI MDM: CPT | Performed by: EMERGENCY MEDICINE

## 2025-06-04 PROCEDURE — 93010 ELECTROCARDIOGRAM REPORT: CPT | Performed by: INTERNAL MEDICINE

## 2025-06-04 PROCEDURE — 96367 TX/PROPH/DG ADDL SEQ IV INF: CPT

## 2025-06-04 PROCEDURE — 99285 EMERGENCY DEPT VISIT HI MDM: CPT

## 2025-06-04 PROCEDURE — 87804 INFLUENZA ASSAY W/OPTIC: CPT | Performed by: EMERGENCY MEDICINE

## 2025-06-04 PROCEDURE — 85025 COMPLETE CBC W/AUTO DIFF WBC: CPT | Performed by: EMERGENCY MEDICINE

## 2025-06-04 PROCEDURE — 96365 THER/PROPH/DIAG IV INF INIT: CPT

## 2025-06-04 PROCEDURE — 83880 ASSAY OF NATRIURETIC PEPTIDE: CPT | Performed by: EMERGENCY MEDICINE

## 2025-06-04 PROCEDURE — 87040 BLOOD CULTURE FOR BACTERIA: CPT | Performed by: EMERGENCY MEDICINE

## 2025-06-04 PROCEDURE — 71250 CT THORAX DX C-: CPT

## 2025-06-04 PROCEDURE — 84145 PROCALCITONIN (PCT): CPT | Performed by: EMERGENCY MEDICINE

## 2025-06-04 PROCEDURE — 96375 TX/PRO/DX INJ NEW DRUG ADDON: CPT

## 2025-06-04 PROCEDURE — 87811 SARS-COV-2 COVID19 W/OPTIC: CPT | Performed by: EMERGENCY MEDICINE

## 2025-06-04 PROCEDURE — 94644 CONT INHLJ TX 1ST HOUR: CPT

## 2025-06-04 PROCEDURE — 84484 ASSAY OF TROPONIN QUANT: CPT | Performed by: EMERGENCY MEDICINE

## 2025-06-04 PROCEDURE — 80053 COMPREHEN METABOLIC PANEL: CPT | Performed by: EMERGENCY MEDICINE

## 2025-06-04 PROCEDURE — 96376 TX/PRO/DX INJ SAME DRUG ADON: CPT

## 2025-06-04 PROCEDURE — 82805 BLOOD GASES W/O2 SATURATION: CPT | Performed by: EMERGENCY MEDICINE

## 2025-06-04 PROCEDURE — 36415 COLL VENOUS BLD VENIPUNCTURE: CPT | Performed by: EMERGENCY MEDICINE

## 2025-06-04 PROCEDURE — 93005 ELECTROCARDIOGRAM TRACING: CPT

## 2025-06-04 PROCEDURE — 83605 ASSAY OF LACTIC ACID: CPT | Performed by: EMERGENCY MEDICINE

## 2025-06-04 RX ORDER — HYDROMORPHONE HCL/PF 1 MG/ML
1 SYRINGE (ML) INJECTION ONCE
Refills: 0 | Status: COMPLETED | OUTPATIENT
Start: 2025-06-04 | End: 2025-06-04

## 2025-06-04 RX ORDER — LINEZOLID 2 MG/ML
600 INJECTION, SOLUTION INTRAVENOUS ONCE
Status: COMPLETED | OUTPATIENT
Start: 2025-06-04 | End: 2025-06-04

## 2025-06-04 RX ORDER — HYDROMORPHONE HCL/PF 1 MG/ML
1 SYRINGE (ML) INJECTION ONCE
Status: COMPLETED | OUTPATIENT
Start: 2025-06-04 | End: 2025-06-04

## 2025-06-04 RX ORDER — ALBUTEROL SULFATE 5 MG/ML
10 SOLUTION RESPIRATORY (INHALATION) ONCE
Status: COMPLETED | OUTPATIENT
Start: 2025-06-04 | End: 2025-06-04

## 2025-06-04 RX ORDER — METHYLPREDNISOLONE SODIUM SUCCINATE 125 MG/2ML
125 INJECTION, POWDER, LYOPHILIZED, FOR SOLUTION INTRAMUSCULAR; INTRAVENOUS ONCE
Status: COMPLETED | OUTPATIENT
Start: 2025-06-04 | End: 2025-06-04

## 2025-06-04 RX ORDER — SODIUM CHLORIDE FOR INHALATION 0.9 %
12 VIAL, NEBULIZER (ML) INHALATION ONCE
Status: COMPLETED | OUTPATIENT
Start: 2025-06-04 | End: 2025-06-04

## 2025-06-04 RX ADMIN — HYDROMORPHONE HYDROCHLORIDE 1 MG: 1 INJECTION, SOLUTION INTRAMUSCULAR; INTRAVENOUS; SUBCUTANEOUS at 14:42

## 2025-06-04 RX ADMIN — SODIUM CHLORIDE 1000 ML: 0.9 INJECTION, SOLUTION INTRAVENOUS at 15:32

## 2025-06-04 RX ADMIN — IPRATROPIUM BROMIDE 1 MG: 0.5 SOLUTION RESPIRATORY (INHALATION) at 15:59

## 2025-06-04 RX ADMIN — ISODIUM CHLORIDE 12 ML: 0.03 SOLUTION RESPIRATORY (INHALATION) at 15:59

## 2025-06-04 RX ADMIN — HYDROMORPHONE HYDROCHLORIDE 1 MG: 1 INJECTION, SOLUTION INTRAMUSCULAR; INTRAVENOUS; SUBCUTANEOUS at 20:37

## 2025-06-04 RX ADMIN — METHYLPREDNISOLONE SODIUM SUCCINATE 125 MG: 125 INJECTION, POWDER, FOR SOLUTION INTRAMUSCULAR; INTRAVENOUS at 14:43

## 2025-06-04 RX ADMIN — HYDROMORPHONE HYDROCHLORIDE 1 MG: 1 INJECTION, SOLUTION INTRAMUSCULAR; INTRAVENOUS; SUBCUTANEOUS at 21:19

## 2025-06-04 RX ADMIN — CEFEPIME 2000 MG: 2 INJECTION, POWDER, FOR SOLUTION INTRAVENOUS at 18:36

## 2025-06-04 RX ADMIN — LINEZOLID 600 MG: 600 INJECTION, SOLUTION INTRAVENOUS at 17:06

## 2025-06-04 RX ADMIN — ALBUTEROL SULFATE 10 MG: 2.5 SOLUTION RESPIRATORY (INHALATION) at 15:59

## 2025-06-04 RX ADMIN — HYDROMORPHONE HYDROCHLORIDE 1 MG: 1 INJECTION, SOLUTION INTRAMUSCULAR; INTRAVENOUS; SUBCUTANEOUS at 16:59

## 2025-06-04 RX ADMIN — DOXYCYCLINE 100 MG: 100 INJECTION, POWDER, LYOPHILIZED, FOR SOLUTION INTRAVENOUS at 15:34

## 2025-06-04 NOTE — EMTALA/ACUTE CARE TRANSFER
[facility] EMERGENCY DEPARTMENT  [address]  [place] [address]  Dept: [phone]      EMTALA TRANSFER CONSENT    NAME Maximilian Mann                                         1975                              MRN [id number]    I have been informed of my rights regarding examination, treatment, and transfer   by Dr. Jerman Snyder MD    Benefits: Specialized equipment and/or services available at the receiving facility (Include comment)________________________ (Transplant service)    Risks: Potential for delay in receiving treatment, Potential deterioration of medical condition, Loss of IV, Increased discomfort during transfer, Possible worsening of condition or death during transfer      Consent for Transfer:  I acknowledge that my medical condition has been evaluated and explained to me by the emergency department physician or other qualified medical person and/or my attending physician, who has recommended that I be transferred to the service of  Accepting Physician: Larry at Accepting Facility Name, City & State : [facility]. The above potential benefits of such transfer, the potential risks associated with such transfer, and the probable risks of not being transferred have been explained to me, and I fully understand them.  The doctor has explained that, in my case, the benefits of transfer outweigh the risks.  I agree to be transferred.    I authorize the performance of emergency medical procedures and treatments upon me in both transit and upon arrival at the receiving facility.  Additionally, I authorize the release of any and all medical records to the receiving facility and request they be transported with me, if possible.  I understand that the safest mode of transportation during a medical emergency is an ambulance and that the Hospital advocates the use of this mode of transport. Risks of traveling to the receiving facility by car,  including absence of medical control, life sustaining equipment, such as oxygen, and medical personnel has been explained to me and I fully understand them.    (JUSTIN CORRECT BOX BELOW)  [  ]  I consent to the stated transfer and to be transported by ambulance/helicopter.  [  ]  I consent to the stated transfer, but refuse transportation by ambulance and accept full responsibility for my transportation by car.  I understand the risks of non-ambulance transfers and I exonerate the Hospital and its staff from any deterioration in my condition that results from this refusal.    X___________________________________________    DATE  25  TIME________  Signature of patient or legally responsible individual signing on patient behalf           RELATIONSHIP TO PATIENT_________________________          Provider Certification    NAME Maximilian Mann                                         1975                              MRN [id number]    A medical screening exam was performed on the above named patient.  Based on the examination:    Condition Necessitating Transfer The primary encounter diagnosis was Pneumonia. A diagnosis of History of lung transplant (HCC) was also pertinent to this visit.    Patient Condition: The patient has been stabilized such that within reasonable medical probability, no material deterioration of the patient condition or the condition of the unborn child(joseph) is likely to result from the transfer    Reason for Transfer: Level of Care needed not available at this facility    Transfer Requirements: Facility [facility]   Space available and qualified personnel available for treatment as acknowledged by    Agreed to accept transfer and to provide appropriate medical treatment as acknowledged by       Larry  Appropriate medical records of the examination and treatment of the patient are provided at the time of transfer   STAFF INITIAL WHEN COMPLETED _______  Transfer  will be performed by qualified personnel from    and appropriate transfer equipment as required, including the use of necessary and appropriate life support measures.    Provider Certification: I have examined the patient and explained the following risks and benefits of being transferred/refusing transfer to the patient/family:  General risk, such as traffic hazards, adverse weather conditions, rough terrain or turbulence, possible failure of equipment (including vehicle or aircraft), or consequences of actions of persons outside the control of the transport personnel, Unanticipated needs of medical equipment and personnel during transport, Risk of worsening condition, The possibility of a transport vehicle being unavailable      Based on these reasonable risks and benefits to the patient and/or the unborn child(joseph), and based upon the information available at the time of the patient’s examination, I certify that the medical benefits reasonably to be expected from the provision of appropriate medical treatments at another medical facility outweigh the increasing risks, if any, to the individual’s medical condition, and in the case of labor to the unborn child, from effecting the transfer.    X____________________________________________ DATE 06/04/25        TIME_______      ORIGINAL - SEND TO MEDICAL RECORDS   COPY - SEND WITH PATIENT DURING TRANSFER

## 2025-06-04 NOTE — ED PROVIDER NOTES
ED Disposition       None          Assessment & Plan       Medical Decision Making  Imaging shows a new left upper lobe infiltrate.  Patient was cultured.  Empiric antibiotics were ordered based on previous hospitalization.  Patient did not appear to be in congestive heart failure.  Last visit patient had a CAT scan that was negative for pulmonary embolism.  She is on oral anticoagulation.  She has been compliant.  Pulmonary embolism today would be unlikely.  There is no pleural effusion.  She does have bronchospasm.  She is treated with heart neb and steroids.  Wheezing improved.  Now she is mostly very rhonchorous.  VBG was okay.  Patient does not meet sepsis criteria.  As patient is a transplant patient consulted with [facility] (where patient receives her transplant) for transfer.  Accepted for transfer.    Amount and/or Complexity of Data Reviewed  Labs: ordered. Decision-making details documented in ED Course.  Radiology: ordered. Decision-making details documented in ED Course.  ECG/medicine tests: ordered and independent interpretation performed. Decision-making details documented in ED Course.    Risk  Prescription drug management.  Decision regarding hospitalization.             Medications   doxycycline (VIBRAMYCIN) 100 mg in sodium chloride 0.9 % 100 mL IVPB (has no administration in time range)   linezolid (ZYVOX) IVPB (premix in dextrose) 600 mg 300 mL (has no administration in time range)   albuterol inhalation solution 10 mg (has no administration in time range)   ipratropium (ATROVENT) 0.02 % inhalation solution 1 mg (has no administration in time range)   sodium chloride 0.9 % inhalation solution 12 mL (has no administration in time range)   sodium chloride 0.9 % bolus 1,000 mL (has no administration in time range)   HYDROmorphone (DILAUDID) injection 1 mg (1 mg Intravenous Given 6/4/25 3739)   methylPREDNISolone sodium succinate (Solu-MEDROL) injection 125 mg (125 mg Intravenous  Given 6/4/25 1443)       ED Risk Strat Scores                    No data recorded        SBIRT 20yo+      Flowsheet Row Most Recent Value   Initial Alcohol Screen: US AUDIT-C     1. How often do you have a drink containing alcohol? 1 Filed at: 06/04/2025 1340   2. How many drinks containing alcohol do you have on a typical day you are drinking?  1 Filed at: 06/04/2025 1340   3a. Male UNDER 65: How often do you have five or more drinks on one occasion? 0 Filed at: 06/04/2025 1340   3b. FEMALE Any Age, or MALE 65+: How often do you have 4 or more drinks on one occassion? 0 Filed at: 06/04/2025 1340   Audit-C Score 2 Filed at: 06/04/2025 1340   NICOLE: How many times in the past year have you...    Used an illegal drug or used a prescription medication for non-medical reasons? Never Filed at: 06/04/2025 1340                            History of Present Illness       Chief Complaint   Patient presents with    Shortness of Breath     Pt. Was discharged from the hospital a week ago, complaint of worsening SOB since Friday. Hx of  2 lung transplant. Currently on 4 LNC..       Past Medical History[1]   Past Surgical History[2]   Family History[3]   Social History[4]   E-Cigarette/Vaping    E-Cigarette Use Never User       E-Cigarette/Vaping Substances    Nicotine No     THC No     CBD No     Flavoring No     Other No     Unknown No       I have reviewed and agree with the history as documented.     Patient is a 49-year-old male.  History of cystic fibrosis.  She had bilateral lung transplant.  The right lung was subsequently resected.  History of chronic left lung rejection.  Patient was hospitalized last month for shortness of breath.  She was eventually transferred to [facility].  She was felt to have infections that included MRSA and Pseudomonas.  She was last treated with linezolid and doxycycline.  She was discharged about a week ago.  She did well for about a day when she started developing shortness of  breath again.  She normally does not require oxygen.  Patient is complaining of dyspnea on exertion.  Her sats dropped into the 70s.  She is now requiring 4 L/min nasal cannula.  She has a productive cough.  She has been wheezing.  No fever.  She does report chest pain.  She is anticoagulated on Eliquis.  During last admission she had a CTA of the chest that was negative for pulmonary embolism.  She denies any calf pain unilateral leg swelling.  No peripheral edema.  Patient denies cardiac history.        Review of Systems   Constitutional:  Negative for chills and fever.   HENT:  Negative for rhinorrhea and sore throat.    Eyes:  Negative for pain, redness and visual disturbance.   Respiratory:  Positive for cough, shortness of breath and wheezing.    Cardiovascular:  Positive for chest pain. Negative for leg swelling.   Gastrointestinal:  Negative for abdominal pain, diarrhea and vomiting.   Endocrine: Negative for polydipsia and polyuria.   Genitourinary:  Negative for dysuria, frequency, hematuria, vaginal bleeding and vaginal discharge.   Musculoskeletal:  Negative for back pain and neck pain.   Skin:  Negative for rash and wound.   Allergic/Immunologic: Negative for immunocompromised state.   Neurological:  Negative for weakness, numbness and headaches.   Hematological:  Does not bruise/bleed easily.   Psychiatric/Behavioral:  Negative for hallucinations and suicidal ideas.    All other systems reviewed and are negative.          Objective       ED Triage Vitals   Temperature Pulse Blood Pressure Respirations SpO2 Patient Position - Orthostatic VS   06/04/25 1338 06/04/25 1338 06/04/25 1338 06/04/25 1338 06/04/25 1338 --   98.1 °F (36.7 °C) 98 132/83 (!) 30 96 %       Temp Source Heart Rate Source BP Location FiO2 (%) Pain Score    06/04/25 1338 06/04/25 1338 06/04/25 1338 -- 06/04/25 1442    Oral Monitor Left arm  7      Vitals      Date and Time Temp Pulse SpO2 Resp BP Pain Score FACES Pain Rating User    06/04/25 1442 -- -- -- -- -- 7 -- AM   06/04/25 1338 98.1 °F (36.7 °C) 98 96 % 30 132/83 -- --             Physical Exam  Vitals reviewed.   Constitutional:       General: She is not in acute distress.  HENT:      Head: Normocephalic and atraumatic.      Nose: Nose normal.      Mouth/Throat:      Mouth: Mucous membranes are moist.     Eyes:      General:         Right eye: No discharge.         Left eye: No discharge.      Conjunctiva/sclera: Conjunctivae normal.       Cardiovascular:      Rate and Rhythm: Normal rate and regular rhythm.      Pulses: Normal pulses.      Heart sounds: Normal heart sounds. No murmur heard.     No friction rub. No gallop.   Pulmonary:      Effort: Pulmonary effort is normal. Tachypnea present. No respiratory distress.      Breath sounds: No stridor. Wheezing present. No rhonchi or rales.   Abdominal:      General: Bowel sounds are normal. There is no distension.      Palpations: Abdomen is soft.      Tenderness: There is no abdominal tenderness. There is no right CVA tenderness, left CVA tenderness, guarding or rebound.     Musculoskeletal:         General: No swelling, tenderness, deformity or signs of injury. Normal range of motion.      Cervical back: Normal range of motion and neck supple. No rigidity.      Right lower leg: No edema.      Left lower leg: No edema.      Comments: No calf tenderness or unilateral leg swelling.     Skin:     General: Skin is warm and dry.      Coloration: Skin is not jaundiced.      Findings: No rash.     Neurological:      General: No focal deficit present.      Mental Status: She is alert and oriented to person, place, and time.      Sensory: No sensory deficit.      Motor: Motor function is intact.     Psychiatric:         Mood and Affect: Mood normal.         Behavior: Behavior normal.         Results Reviewed       Procedure Component Value Units Date/Time    Blood gas, venous [[id number]]  (Abnormal) Collected: 06/04/25 1448    Lab Status:  Final result Specimen: Blood from Central Venous Line Updated: 06/04/25 1500     pH, Melvin 7.395     pCO2, Melvin 45.7 mm Hg      pO2, Melvin 49.3 mm Hg      HCO3, Melvin 27.4 mmol/L      Base Excess, Melvin 2.0 mmol/L      O2 Content, Melvin 14.5 ml/dL      O2 HGB, VENOUS 80.4 %     CBC and differential [[id number]]  (Abnormal) Collected: 06/04/25 1448    Lab Status: Final result Specimen: Blood from Central Venous Line Updated: 06/04/25 1459     WBC 8.94 Thousand/uL      RBC 3.95 Million/uL      Hemoglobin 12.0 g/dL      Hematocrit 38.3 %      MCV 97 fL      MCH 30.4 pg      MCHC 31.3 g/dL      RDW 16.6 %      MPV 9.7 fL      Platelets 165 Thousands/uL      nRBC 0 /100 WBCs      Segmented % 85 %      Immature Grans % 1 %      Lymphocytes % 7 %      Monocytes % 6 %      Eosinophils Relative 1 %      Basophils Relative 0 %      Absolute Neutrophils 7.63 Thousands/µL      Absolute Immature Grans 0.06 Thousand/uL      Absolute Lymphocytes 0.62 Thousands/µL      Absolute Monocytes 0.56 Thousand/µL      Eosinophils Absolute 0.05 Thousand/µL      Basophils Absolute 0.02 Thousands/µL     Lactic acid, plasma (w/reflex if result > 2.0) [[id number]] Collected: 06/04/25 1448    Lab Status: In process Specimen: Blood from Central Venous Line Updated: 06/04/25 1457    Procalcitonin [[id number]] Collected: 06/04/25 1448    Lab Status: In process Specimen: Blood from Central Venous Line Updated: 06/04/25 1456    Comprehensive metabolic panel [[id number]] Collected: 06/04/25 1448    Lab Status: In process Specimen: Blood from Central Venous Line Updated: 06/04/25 1456    B-Type Natriuretic Peptide(BNP) [[id number]] Collected: 06/04/25 1448    Lab Status: In process Specimen: Blood from Central Venous Line Updated: 06/04/25 1456    HS Troponin 0hr (reflex protocol) [[id number]] Collected: 06/04/25 1448    Lab Status: In process Specimen: Blood from Central Venous Line Updated: 06/04/25 1456    FLU/COVID Rapid Antigen (30 min. TAT) - Preferred screening  test in ED [[id number]] Collected: 25 1448    Lab Status: No result Specimen: Nares from Nose     Blood culture #1 [[id number]]     Lab Status: No result Specimen: Blood     Blood culture #2 [[id number]]     Lab Status: No result Specimen: Blood             CT chest without contrast    (Results Pending)       ECG 12 Lead Documentation Only    Date/Time: 2025 3:10 PM    Performed by: Jerman Snyder MD  Authorized by: Jerman Snyder MD    ECG reviewed by me, the ED Provider: yes    Patient location:  ED  Comments:      Normal sinus rhythm.  Right atrial enlargement.  No acute ischemic ST or T wave changes.  Borderline EKG.      ED Medication and Procedure Management   Prior to Admission Medications   Prescriptions Last Dose Informant Patient Reported? Taking?   ALPRAZolam (XANAX) 1 mg tablet  Self No No   Sig: Take 1 tablet (1 mg total) by mouth 2 (two) times a day as needed for anxiety or sleep   Ascorbic Acid (vitamin C) 100 MG tablet  Self Yes No   Sig: Take 100 mg by mouth in the morning.   BD Pen Needle Abi U/F 32G X 4 MM MISC  Self No No   Si times daily   Cholecalciferol (Vitamin D3) 125 MCG (5000 UT) TABS  Self Yes No   Sig: Take 5,000 Units by mouth in the morning.   Continuous Blood Gluc  (Dexcom G7 ) SUKHI  Self No No   Sig: Use 1 each continuous   Continuous Glucose Sensor (Dexcom G7 Sensor)  Self No No   Sig: Use 1 Device every 10 days   Hydrocod Edgar-Chlorphe Edgar ER (TUSSIONEX) 10-8 mg/5 mL ER suspension  Self No No   Sig: Take 5 mL by mouth every 12 (twelve) hours as needed for cough Max Daily Amount: 10 mL   Patient not taking: Reported on 2025   Insulin Disposable Pump (Omnipod 5 G6 Intro, Gen 5,) KIT  Self No No   Sig: Use 1 Units continuous   Patient not taking: Reported on 2025   Insulin Disposable Pump (Omnipod 5 G6 Pods, Gen 5,) MISC  Self No No   Sig: Use 1 Units every other day   Patient not taking: Reported on 2025   Insulin Pen Needle (Comfort EZ  Pen Needles) 33G X 4 MM MISC  Self No No   Sig: Use to inject insulin 4 times a day   Respiratory Therapy Supplies (Nebulizer) SUKHI  Self Yes No   Sodium Chloride 7 % NEBU  Self Yes No   Turmeric 500 MG CAPS  Self Yes No   Sig: Take by mouth   acetaminophen (TYLENOL) 325 mg tablet  Self No No   Sig: Take 3 tablets (975 mg total) by mouth every 8 (eight) hours   albuterol (2.5 mg/3 mL) 0.083 % nebulizer solution  Self No No   Sig: Take 6 mL (5 mg total) by nebulization 2 (two) times a day   albuterol (PROVENTIL HFA,VENTOLIN HFA) 90 mcg/act inhaler  Self Yes No   apixaban (ELIQUIS) 5 mg  Self No No   Sig: Take 1 tablet (5 mg total) by mouth 2 (two) times a day   atovaquone (MEPRON) 750 mg/5 mL suspension  Self Yes No   Sig: Take 1,500 mg by mouth in the morning.   azelastine (OPTIVAR) 0.05 % ophthalmic solution  Self No No   Sig: INSTILL 1 DROP INTO BOTH EYES TWICE A DAY   Patient not taking: No sig reported   azithromycin (ZITHROMAX) 250 mg tablet  Self Yes No   Sig: Take 250 mg by mouth 3 (three) times a week   bisacodyl (DULCOLAX) 5 mg EC tablet  Self No No   Sig: Take 1 tablet (5 mg total) by mouth daily at bedtime   budesonide-formoterol (SYMBICORT) 160-4.5 mcg/act inhaler  Self Yes No   Sig: Inhale 2 puffs every 12 (twelve) hours   cyclobenzaprine (FLEXERIL) 5 mg tablet   No No   Sig: Take 1 tablet (5 mg total) by mouth 3 (three) times a day as needed for muscle spasms for up to 14 days   dexlansoprazole (DEXILANT) 60 MG capsule  Self No No   Sig: Take 1 capsule (60 mg total) by mouth daily   dronabinol (MARINOL) 5 MG capsule  Self No No   Sig: Take 1 capsule (5 mg total) by mouth 3 (three) times a day   escitalopram (LEXAPRO) 5 mg tablet  Self No No   Sig: Take 1 tablet (5 mg total) by mouth every other day   famotidine (PEPCID) 40 MG tablet  Self No No   Sig: TAKE 1 TABLET BY MOUTH EVERYDAY AT BEDTIME   ferrous sulfate 325 (65 Fe) mg tablet  Self Yes No   fluconazole (DIFLUCAN) 150 mg tablet  Self Yes No    Sig: Take 1 tablet by mouth in the morning   Patient not taking: Reported on 5/6/2025   furosemide (LASIX) 20 mg tablet  Self Yes No   Sig: Take 40 mg by mouth in the morning.   insulin aspart (NovoLOG FlexPen) 100 UNIT/ML injection pen  Self No No   Sig: INJECT 10 UNITS pre-BREAKFAST, 8 UNITS pre-LUNCH and 12 UNITS pre-DINNER plus scale; max 50 UNITS/day   insulin degludec (Tresiba FlexTouch) 100 units/mL injection pen  Self No No   Sig: Inject 12 Units under the skin daily   insulin glargine (LANTUS) 100 units/mL subcutaneous injection  Self Yes No   Sig: Inject 10 Units under the skin daily   Patient not taking: Reported on 5/15/2025   linezolid (ZYVOX) 600 mg tablet  Self Yes No   Sig: Take 600 mg by mouth 2 (two) times a day   Patient not taking: Reported on 5/6/2025   magnesium gluconate (MAGONATE) 500 mg tablet  Self Yes No   Sig: Take 500 mg by mouth in the morning and 500 mg in the evening.   naloxone (NARCAN) 4 mg/0.1 mL nasal spray  Self No No   Sig: Administer 1 spray into a nostril. If no response after 2-3 minutes, give another dose in the other nostril using a new spray.   ondansetron (ZOFRAN) 4 mg tablet  Self Yes No   Sig: Take 4 mg by mouth every 8 (eight) hours as needed for nausea   oxyCODONE (ROXICODONE) 10 MG TABS   No No   Sig: Take 1 tablet (10 mg total) by mouth every 4 (four) hours as needed for moderate pain Max Daily Amount: 60 mg   oxybutynin (DITROPAN XL) 15 MG 24 hr tablet  Self No No   Sig: Take 1 tablet (15 mg total) by mouth daily at bedtime   pancrelipase, Lip-Prot-Amyl, (CREON) 24,000 units  Self Yes No   Sig: Take 48,000 Units by mouth in the morning and 48,000 Units at noon and 48,000 Units in the evening. Take with meals.   pantoprazole (PROTONIX) 40 mg tablet   No No   Sig: Take 1 tablet (40 mg total) by mouth 2 (two) times a day before meals   polyethylene glycol (MIRALAX) 17 g packet  Self No No   Sig: Take 17 g by mouth 2 (two) times a day   predniSONE 10 mg tablet  Self  Yes No   Sig: Take 1.5 tablets by mouth in the morning   pregabalin (LYRICA) 150 mg capsule  Self No No   Sig: Take 1 capsule (150 mg total) by mouth daily   Patient not taking: Reported on 5/15/2025   tacrolimus (PROGRAF) 1 mg capsule  Self No No   Sig: Take 3 capsules (3 mg total) by mouth 2 (two) times a day   tretinoin (RETIN-A) 0.025 % cream  Self No No   Sig: Apply pea sized amount to entire face nightly and follow with moisturizer   valACYclovir (VALTREX) 500 mg tablet  Self Yes No   Sig: Take 500 mg by mouth in the morning.   vitamin B-12 (VITAMIN B-12) 1,000 mcg tablet  Self Yes No   Sig: Take by mouth in the morning.      Facility-Administered Medications: None     Patient's Medications   Discharge Prescriptions    No medications on file     No discharge procedures on file.  ED SEPSIS DOCUMENTATION                [1]   Past Medical History:  Diagnosis Date    Abnormal uterine bleeding (AUB) 7/7/2022    ABPA (allergic bronchopulmonary aspergillosis) (HCC) 07/08/2012    Acute kidney injury (HCC) 09/08/2017    Acute on chronic respiratory failure (HCC) 03/04/2017    Last Assessment & Plan:  Formatting of this note might be different from the original. Recurrent with clinical worsening with enlarging PTX, s/p multiple chest tubes, but new fevers 6/19 and worsening leukocytosis indicating infection as cause of 6/19 decompensation with profound hypoxia in setting of shunting through right lung and continued pneumothorax s/p 2nd chest tube placement.  6/27: Right    Anaphylaxis 06/06/2022    Anemia     Asthma     Chest pain 09/21/2021    Chronic pain     Chronic respiratory failure with hypoxia (HCC) 09/07/2021    COVID-19 06/03/2022    Cystic fibrosis (HCC)     Deep vein thrombosis (HCC)     Diabetes mellitus (HCC)     Fever 09/22/2024    GERD (gastroesophageal reflux disease)     HBP (high blood pressure)     HCAP (healthcare-associated pneumonia) 03/06/2020    History of transfusion     Influenza B 02/23/2020     Kidney stone     Moderate protein-calorie malnutrition (HCC) 01/08/2022    Neutropenic fever  (HCC) 09/26/2020    Pneumonia due to Pseudomonas species (HCC) 10/28/2010    Last Assessment & Plan:  Formatting of this note might be different from the original. Polymicrobial pna w/ pseudomonas, M. Abscessus, Cadida glabrata/albicans    PONV (postoperative nausea and vomiting)     Pseudomonas aeruginosa infection 09/20/2012    Renal calculus     Squamous cell skin cancer 01/12/2023    SCCIS- Left upper thigh    Transplant recipient     Lung- 2013, 2017    Ulcerative colitis (HCC)     Wellness examination 02/04/2013    Formatting of this note might be different from the original. Discussed with patient and acknowledgement form signed on 2/4/2013 (ALYSHA)   [2]   Past Surgical History:  Procedure Laterality Date    APPENDECTOMY      AUGMENTATION MAMMAPLASTY Bilateral 2004    AUGMENTATION MAMMAPLASTY Right 2021    removed     BREAST SURGERY      Aug.     BRONCHOSCOPY      CHOLECYSTECTOMY      COLONOSCOPY      EGD  07/2019    Yeast Infect.     FL RETROGRADE PYELOGRAM  01/09/2020    FL RETROGRADE PYELOGRAM  02/04/2020    FL RETROGRADE PYELOGRAM  9/20/2024    GASTROSCOPY  03/01/2024    GASTROSTOMY TUBE, PLACE      IR BALLOON-OCCLUDED ANTEGRADE TRANSVENOUS OBLITERATION (BATO)  11/22/2022    IR PORT PLACEMENT  03/22/2022    IR PORT REMOVAL  01/10/2022    IR PORT STRIPPING  11/25/2022    IR TUNNELED CENTRAL LINE PLACEMENT  01/13/2022    LUNG SURGERY      LUNG TRANSPLANT      X2    MN CYSTO/URETERO W/LITHOTRIPSY &INDWELL STENT INSRT Right 02/04/2020    Procedure: CYSTOSCOPY URETEROSCOPY WITH LITHOTRIPSY HOLMIUM LASER, RETROGRADE PYELOGRAM AND INSERTION STENT URETERAL;  Surgeon: Patricio Perry MD;  Location: [facility];  Service: Urology    MN CYSTO/URETERO W/LITHOTRIPSY &INDWELL STENT INSRT Left 9/20/2024    Procedure: CYSTOSCOPY URETEROSCOPY WITH LITHOTRIPSY HOLMIUM LASER, RETROGRADE PYELOGRAM AND INSERTION STENT URETERAL,  BASKET STONE EXTRACTION;  Surgeon: Lee Briceno MD;  Location: [facility];  Service: Urology    UT CYSTOURETHROSCOPY W/URETERAL CATHETERIZATION Right 2020    Procedure: CYSTOSCOPY RETROGRADE PYELOGRAM WITH INSERTION STENT URETERAL;  Surgeon: Patricio Perry MD;  Location: [facility];  Service: Urology    SKIN BIOPSY      US GUIDED VASCULAR ACCESS  2017   [3]   Family History  Problem Relation Name Age of Onset    Alcohol abuse Mother Iwona Roca     Mental illness Mother Iwona Roca     Depression Mother Iwona Roca     Alcohol abuse Father      COPD Maternal Grandmother Meg Camarillo     Lung cancer Maternal Grandmother Meg Camarillo 65    Cancer Maternal Grandmother Meg Camarillo     COPD Maternal Grandfather Aaron Camarillo     Cancer Maternal Grandfather Aaron Camarillo     COPD Paternal Grandmother      Lung cancer Paternal Grandmother  65    No Known Problems Sister      No Known Problems Paternal Aunt      Breast cancer Neg Hx      Ovarian cancer Neg Hx      Colon cancer Neg Hx     [4]   Social History  Tobacco Use    Smoking status: Former     Current packs/day: 0.00     Average packs/day: 0.5 packs/day for 12.0 years (6.0 ttl pk-yrs)     Types: Cigarettes     Start date: 1993     Quit date: 2005     Years since quittin.4     Passive exposure: Past    Smokeless tobacco: Never   Vaping Use    Vaping status: Never Used   Substance Use Topics    Alcohol use: Yes     Alcohol/week: 1.0 standard drink of alcohol     Types: 1 Glasses of wine per week     Comment: social    Drug use: Yes     Frequency: 2.0 times per week     Types: Marijuana     Comment: medical edible marijuana prescribed        Jerman Snyder MD  [phone]

## 2025-06-08 LAB
BACTERIA BLD CULT: NORMAL
BACTERIA BLD CULT: NORMAL

## 2025-06-09 LAB
BACTERIA BLD CULT: NORMAL
BACTERIA BLD CULT: NORMAL

## 2025-06-14 PROBLEM — J18.0 BRONCHOPNEUMONIA: Status: RESOLVED | Noted: 2025-05-15 | Resolved: 2025-06-14

## 2025-06-16 DIAGNOSIS — F41.9 ANXIETY: ICD-10-CM

## 2025-06-16 RX ORDER — ALPRAZOLAM 1 MG/1
1 TABLET ORAL 2 TIMES DAILY PRN
Qty: 45 TABLET | Refills: 0 | Status: SHIPPED | OUTPATIENT
Start: 2025-06-16 | End: 2025-07-16

## 2025-06-19 ENCOUNTER — TELEPHONE (OUTPATIENT)
Dept: INTERNAL MEDICINE CLINIC | Facility: CLINIC | Age: 50
End: 2025-06-19

## 2025-06-19 ENCOUNTER — TRANSITIONAL CARE MANAGEMENT (OUTPATIENT)
Dept: INTERNAL MEDICINE CLINIC | Facility: CLINIC | Age: 50
End: 2025-06-19

## 2025-06-26 ENCOUNTER — TELEPHONE (OUTPATIENT)
Dept: DERMATOLOGY | Facility: CLINIC | Age: 50
End: 2025-06-26

## 2025-06-26 NOTE — TELEPHONE ENCOUNTER
2nd attempt to reach patient regarding her appt with Dr. Romero on 7/2 in [facility] office. Left a previous message on 4/9 for pt to call the office to r/s this appointment, as provider will be out of the office. Left another message today, advising pt to call back if she would like to r/s this appt.

## 2025-06-27 DIAGNOSIS — G89.4 CHRONIC PAIN SYNDROME: ICD-10-CM

## 2025-06-27 RX ORDER — OXYCODONE HYDROCHLORIDE 10 MG/1
10 TABLET ORAL EVERY 4 HOURS PRN
Qty: 180 TABLET | Refills: 0 | Status: SHIPPED | OUTPATIENT
Start: 2025-06-27

## 2025-06-27 NOTE — TELEPHONE ENCOUNTER
Medication: oxyCODONE (ROXICODONE) 10 MG TABS     Dose/Frequency: Take 1 tablet (10 mg total) by mouth every 4 (four) hours as needed for moderate pain Max Daily Amount: 60 mg     Quantity: Dispense: 180 tablet     Pharmacy: [facility]/pharm[facility] #[id number] - [place]     Office:   [x] PCP/Provider - Areli Harrington MD   [] Speciality/Provider -     Does the patient have enough for 3 days?   [] Yes   [] No - Send as HP to POD

## 2025-07-12 NOTE — ANESTHESIA PREPROCEDURE EVALUATION
-Outpatient unit [facility] TTS due to recent rehab admission.  Was prior at [facility]  - Access-right IJ PermCath  - Prior estimated dry weight on discharge in June 2025 was 89.5 kg but dry weight was being challenged with dialysis  - Noted patient only had partial treatment last Thursday and only had 35 minutes of treatment on Saturday.  Was sent to the hospital on July 8 with erratic behavior  - Outpatient schedule-TTS  - Patient has been noncompliant with outpatient dialysis per my discussion with outpatient dialysis nurse-for at least the past 2 weeks he has been shortening treatment to 2-hour treatments and this past Saturday only did 30-minute treatments  - 7/8-patient completed dialysis treatment for 3-hour treatment with lower flows as he has been shortening many treatments  - 7/9-plan for short dialysis treatment for clearance  - 7/10 - HD for regular treatment. K improved with HD yesterday. Na improving with UF with HD.  - 7/11-patient's mentation has improved and he is back to his baseline.  Alert and oriented x 3.  No longer agitated.  Sodium, potassium, bicarbonate appropriate.  Hemoglobin at goal.  - 7/12-dialysis    Plan  - Dialysis today-seen and examined on dialysis-sodium level 138, bicarbonate 35, F1 80 filter, 4-hour treatment, 2 to 3 L ultrafiltration, if tolerates,  - Patient's mentation has improved and he is back to baseline  - Retinal detachment concern on CT head-patient states he follows with ophthalmology as outpatient.  Reviewed with primary team advanced practitioner  - Please call the renal team if questions or issues arise tomorrow   Procedure:  COLONOSCOPY  EGD    Relevant Problems   CARDIO   (+) DVT (deep venous thrombosis) (HCC)   (+) Essential hypertension   (+) Hypercholesteremia   (+) Pulmonary embolus (HCC)   (+) Pulmonary hypertension (HCC)   (+) Rib pain      ENDO   (+) Type 1 diabetes (HCC)      GI/HEPATIC   (+) Chronic pancreatitis, unspecified pancreatitis type (HCC)   (+) Esophageal reflux   (+) Hiatal hernia   (+) Pancreatic insufficiency      /RENAL   (+) CKD (chronic kidney disease) stage 2, GFR 60-89 ml/min   (+) Renal calculus      HEMATOLOGY   (+) Acute on chronic anemia      MUSCULOSKELETAL   (+) Hiatal hernia      NEURO/PSYCH   (+) Anxiety   (+) Chronic pain disorder   (+) Weakness of right lower extremity      PULMONARY   (+) Asthma      Respiratory   (+) Cystic fibrosis (HCC)      Digestive   (+) Gastroparesis      Other   (+) Immunosuppression (HCC)   (+) Long term current use of bisphosphonates   (+) Long term current use of systemic steroids   (+) Severe protein-calorie malnutrition ([address] )   (+) Transplant recipient   (+) Transplant rejection       Latest Reference Range & Units 06/29/23 07:06   PREGNANCY TEST URINE Negative  Negative     Normal sinus rhythm  ST elevation, consider early repolarization, pericarditis, or injury  Confirmed by Elba Gutierrez ([id number]) on 5/7/2023 1:01:37 PM    ECHO 11/9/22:   Left Ventricle: Left ventricular cavity size is normal  Wall thickness is normal  The left ventricular ejection fraction is 55%  Systolic function is normal  Wall motion is normal  Diastolic function is mildly abnormal, consistent with grade I (abnormal) relaxation  •  Right Ventricle: Right ventricular cavity size is mildly dilated  Systolic function is mildly reduced      Findings    Left Ventricle Left ventricular cavity size is normal  Wall thickness is normal  The left ventricular ejection fraction is 55%   Systolic function is normal   Wall motion is normal  Diastolic function is mildly abnormal, consistent with grade I (abnormal) relaxation  Right Ventricle Right ventricular cavity size is mildly dilated  Systolic function is mildly reduced  Wall thickness is normal       Left Atrium The atrium is normal in size  Right Atrium The atrium is normal in size  Aortic Valve The aortic valve is trileaflet  The leaflets are not thickened  The leaflets are not calcified  The leaflets exhibit normal mobility  There is no evidence of regurgitation  The aortic valve has no significant stenosis  Mitral Valve Mitral valve structure is normal  There is trace regurgitation  There is no evidence of stenosis  Tricuspid Valve Tricuspid valve structure is normal  There is trace regurgitation  There is no evidence of stenosis  Pulmonic Valve Pulmonic valve structure is normal  There is trace regurgitation  There is no evidence of stenosis  Ascending Aorta The aortic root is normal in size  IVC/SVC The inferior vena cava is normal in size  Respirophasic changes were normal       Pericardium There is no pericardial effusion  Pulmonary Artery The pulmonary artery systolic pressure is normal             Physical Exam    Airway    Mallampati score: II  TM Distance: >3 FB  Neck ROM: full     Dental   No notable dental hx     Cardiovascular      Pulmonary      Other Findings        Anesthesia Plan  ASA Score- 4     Anesthesia Type- IV sedation with anesthesia with ASA Monitors  Additional Monitors:   Airway Plan:           Plan Factors-Exercise tolerance (METS): >4 METS  Chart reviewed  Existing labs reviewed  Patient summary reviewed  Patient is not a current smoker  Patient did not smoke on day of surgery  Induction- intravenous  Postoperative Plan-     Informed Consent- Anesthetic plan and risks discussed with patient  I personally reviewed this patient with the CRNA  Discussed and agreed on the Anesthesia Plan with the CRNA  Kevyn Tiwari

## 2025-07-14 ENCOUNTER — PATIENT MESSAGE (OUTPATIENT)
Dept: INTERNAL MEDICINE CLINIC | Facility: CLINIC | Age: 50
End: 2025-07-14

## 2025-07-14 DIAGNOSIS — F41.9 ANXIETY: ICD-10-CM

## 2025-07-14 RX ORDER — ESCITALOPRAM OXALATE 10 MG/1
10 TABLET ORAL DAILY
Qty: 90 TABLET | Refills: 3 | Status: SHIPPED | OUTPATIENT
Start: 2025-07-14 | End: 2026-07-09

## 2025-07-14 RX ORDER — ALPRAZOLAM 1 MG/1
1 TABLET ORAL 2 TIMES DAILY PRN
Qty: 60 TABLET | Refills: 0 | Status: SHIPPED | OUTPATIENT
Start: 2025-07-14 | End: 2025-08-13

## 2025-07-14 RX ORDER — ESCITALOPRAM OXALATE 5 MG/1
5 TABLET ORAL DAILY
Qty: 90 TABLET | Refills: 3 | Status: SHIPPED | OUTPATIENT
Start: 2025-07-14

## 2025-07-15 ENCOUNTER — ANNUAL EXAM (OUTPATIENT)
Dept: OBGYN CLINIC | Facility: MEDICAL CENTER | Age: 50
End: 2025-07-15
Payer: MEDICARE

## 2025-07-15 ENCOUNTER — HOSPITAL ENCOUNTER (OUTPATIENT)
Dept: RADIOLOGY | Facility: MEDICAL CENTER | Age: 50
Discharge: HOME | End: 2025-07-15
Attending: INTERNAL MEDICINE
Payer: MEDICARE

## 2025-07-15 VITALS
SYSTOLIC BLOOD PRESSURE: 98 MMHG | HEIGHT: 62 IN | DIASTOLIC BLOOD PRESSURE: 60 MMHG | BODY MASS INDEX: 20.8 KG/M2 | WEIGHT: 113 LBS

## 2025-07-15 DIAGNOSIS — N95.1 SYMPTOMS, SUCH AS FLUSHING, SLEEPLESSNESS, HEADACHE, LACK OF CONCENTRATION, ASSOCIATED WITH THE MENOPAUSE: ICD-10-CM

## 2025-07-15 DIAGNOSIS — I82.401 ACUTE DEEP VEIN THROMBOSIS (DVT) OF RIGHT LOWER EXTREMITY, UNSPECIFIED VEIN (HCC): ICD-10-CM

## 2025-07-15 DIAGNOSIS — Z01.419 ENCOUNTER FOR ANNUAL ROUTINE GYNECOLOGICAL EXAMINATION: Primary | ICD-10-CM

## 2025-07-15 DIAGNOSIS — Z12.31 ENCOUNTER FOR SCREENING MAMMOGRAM FOR BREAST CANCER: ICD-10-CM

## 2025-07-15 PROCEDURE — G0145 SCR C/V CYTO,THINLAYER,RESCR: HCPCS | Performed by: STUDENT IN AN ORGANIZED HEALTH CARE EDUCATION/TRAINING PROGRAM

## 2025-07-15 PROCEDURE — G0101 CA SCREEN;PELVIC/BREAST EXAM: HCPCS | Performed by: STUDENT IN AN ORGANIZED HEALTH CARE EDUCATION/TRAINING PROGRAM

## 2025-07-15 PROCEDURE — G0476 HPV COMBO ASSAY CA SCREEN: HCPCS | Performed by: STUDENT IN AN ORGANIZED HEALTH CARE EDUCATION/TRAINING PROGRAM

## 2025-07-15 PROCEDURE — 77063 BREAST TOMOSYNTHESIS BI: CPT

## 2025-07-15 PROCEDURE — 77067 SCR MAMMO BI INCL CAD: CPT

## 2025-07-15 RX ORDER — PROCHLORPERAZINE MALEATE 5 MG/1
5 TABLET ORAL EVERY 8 HOURS PRN
COMMUNITY
Start: 2025-05-27

## 2025-07-15 RX ORDER — NYSTATIN 100000 [USP'U]/ML
5 SUSPENSION ORAL 4 TIMES DAILY
COMMUNITY
Start: 2025-06-18

## 2025-07-15 RX ORDER — DOXYCYCLINE 100 MG/1
TABLET ORAL
COMMUNITY
Start: 2025-05-28 | End: 2025-07-15 | Stop reason: ALTCHOICE

## 2025-07-15 RX ORDER — MYCOPHENOLATE MOFETIL 250 MG/1
250 CAPSULE ORAL 2 TIMES DAILY
COMMUNITY
Start: 2025-05-20

## 2025-07-15 RX ORDER — SULFAMETHOXAZOLE AND TRIMETHOPRIM 800; 160 MG/1; MG/1
1 TABLET ORAL
COMMUNITY
Start: 2025-04-17 | End: 2025-07-15 | Stop reason: ALTCHOICE

## 2025-07-21 LAB
LAB AP GYN PRIMARY INTERPRETATION: NORMAL
Lab: NORMAL

## 2025-07-22 DIAGNOSIS — R35.0 URINE FREQUENCY: Primary | ICD-10-CM

## 2025-07-22 RX ORDER — OXYBUTYNIN CHLORIDE 5 MG/1
5 TABLET, EXTENDED RELEASE ORAL DAILY
Qty: 90 TABLET | Refills: 0 | Status: SHIPPED | OUTPATIENT
Start: 2025-07-22

## 2025-07-24 DIAGNOSIS — G89.4 CHRONIC PAIN SYNDROME: Primary | ICD-10-CM

## 2025-07-24 RX ORDER — OXYCODONE HYDROCHLORIDE 15 MG/1
15 TABLET ORAL EVERY 4 HOURS PRN
Qty: 70 TABLET | Refills: 0 | Status: SHIPPED | OUTPATIENT
Start: 2025-07-24 | End: 2025-08-05

## 2025-07-27 ENCOUNTER — HOSPITAL ENCOUNTER (EMERGENCY)
Facility: HOSPITAL | Age: 50
Discharge: HOME | End: 2025-07-27
Attending: EMERGENCY MEDICINE | Admitting: EMERGENCY MEDICINE
Payer: COMMERCIAL

## 2025-07-27 ENCOUNTER — APPOINTMENT (EMERGENCY)
Dept: RADIOLOGY | Facility: HOSPITAL | Age: 50
End: 2025-07-27
Payer: COMMERCIAL

## 2025-07-27 VITALS
TEMPERATURE: 97.8 F | SYSTOLIC BLOOD PRESSURE: 133 MMHG | HEART RATE: 80 BPM | RESPIRATION RATE: 20 BRPM | DIASTOLIC BLOOD PRESSURE: 99 MMHG | OXYGEN SATURATION: 100 %
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
## 2025-07-27 DIAGNOSIS — S92.901A CLOSED FRACTURE OF RIGHT FOOT, INITIAL ENCOUNTER: ICD-10-CM

## 2025-07-27 PROCEDURE — 99284 EMERGENCY DEPT VISIT MOD MDM: CPT

## 2025-07-27 PROCEDURE — 96372 THER/PROPH/DIAG INJ SC/IM: CPT

## 2025-07-27 PROCEDURE — 99284 EMERGENCY DEPT VISIT MOD MDM: CPT | Performed by: EMERGENCY MEDICINE

## 2025-07-27 PROCEDURE — 73630 X-RAY EXAM OF FOOT: CPT

## 2025-07-27 RX ORDER — HYDROMORPHONE HCL/PF 1 MG/ML
0.5 SYRINGE (ML) INJECTION ONCE
Status: COMPLETED | OUTPATIENT
Start: 2025-07-27 | End: 2025-07-27

## 2025-07-27 RX ORDER — HYDROMORPHONE HCL/PF 1 MG/ML
1 SYRINGE (ML) INJECTION ONCE
Status: COMPLETED | OUTPATIENT
Start: 2025-07-27 | End: 2025-07-27

## 2025-07-27 RX ADMIN — HYDROMORPHONE HYDROCHLORIDE 1 MG: 1 INJECTION, SOLUTION INTRAMUSCULAR; INTRAVENOUS; SUBCUTANEOUS at 13:13

## 2025-07-27 RX ADMIN — HYDROMORPHONE HYDROCHLORIDE 0.5 MG: 1 INJECTION, SOLUTION INTRAMUSCULAR; INTRAVENOUS; SUBCUTANEOUS at 14:36

## 2025-08-01 ENCOUNTER — OFFICE VISIT (OUTPATIENT)
Dept: OBGYN CLINIC | Facility: CLINIC | Age: 50
End: 2025-08-01
Attending: EMERGENCY MEDICINE
Payer: COMMERCIAL

## 2025-08-01 VITALS — BODY MASS INDEX: 19.97 KG/M2 | WEIGHT: 117 LBS | HEIGHT: 64 IN

## 2025-08-01 DIAGNOSIS — S93.409A SPRAIN OF ANKLE, INITIAL ENCOUNTER: ICD-10-CM

## 2025-08-01 DIAGNOSIS — S92.354A NONDISPLACED FRACTURE OF FIFTH METATARSAL BONE, RIGHT FOOT, INITIAL ENCOUNTER FOR CLOSED FRACTURE: Primary | ICD-10-CM

## 2025-08-01 PROCEDURE — 99204 OFFICE O/P NEW MOD 45 MIN: CPT | Performed by: FAMILY MEDICINE

## 2025-08-04 ENCOUNTER — TELEMEDICINE (OUTPATIENT)
Dept: INTERNAL MEDICINE CLINIC | Facility: CLINIC | Age: 50
End: 2025-08-04
Payer: COMMERCIAL

## 2025-08-04 ENCOUNTER — HOME CARE VISIT (OUTPATIENT)
Dept: HOME HEALTH SERVICES | Facility: HOME HEALTHCARE | Age: 50
End: 2025-08-04

## 2025-08-04 DIAGNOSIS — G89.4 CHRONIC PAIN DISORDER: ICD-10-CM

## 2025-08-04 DIAGNOSIS — T86.810 CHRONIC REJECTION OF LUNG TRANSPLANT (HCC): ICD-10-CM

## 2025-08-04 DIAGNOSIS — S92.354D CLOSED NONDISPLACED FRACTURE OF FIFTH METATARSAL BONE OF RIGHT FOOT WITH ROUTINE HEALING, SUBSEQUENT ENCOUNTER: ICD-10-CM

## 2025-08-04 DIAGNOSIS — E84.9 CYSTIC FIBROSIS (HCC): Primary | ICD-10-CM

## 2025-08-04 PROCEDURE — 99213 OFFICE O/P EST LOW 20 MIN: CPT | Performed by: INTERNAL MEDICINE

## 2025-08-04 RX ORDER — OXYCODONE HYDROCHLORIDE 10 MG/1
10 TABLET ORAL EVERY 4 HOURS PRN
Qty: 180 TABLET | Refills: 0 | Status: ON HOLD | OUTPATIENT
Start: 2025-08-04 | End: 2025-09-03

## 2025-08-06 ENCOUNTER — HOME CARE VISIT (OUTPATIENT)
Dept: HOME HEALTH SERVICES | Facility: HOME HEALTHCARE | Age: 50
End: 2025-08-06

## 2025-08-14 ENCOUNTER — TELEPHONE (OUTPATIENT)
Dept: OBGYN CLINIC | Facility: MEDICAL CENTER | Age: 50
End: 2025-08-14

## 2025-08-14 PROBLEM — Z01.419 ENCOUNTER FOR ANNUAL ROUTINE GYNECOLOGICAL EXAMINATION: Status: RESOLVED | Noted: 2025-07-15 | Resolved: 2025-08-14

## 2025-08-15 ENCOUNTER — APPOINTMENT (EMERGENCY)
Dept: CT IMAGING | Facility: HOSPITAL | Age: 50
DRG: 872 | End: 2025-08-15
Payer: COMMERCIAL

## 2025-08-15 ENCOUNTER — APPOINTMENT (EMERGENCY)
Dept: RADIOLOGY | Facility: HOSPITAL | Age: 50
DRG: 872 | End: 2025-08-15
Payer: COMMERCIAL

## 2025-08-15 ENCOUNTER — HOSPITAL ENCOUNTER (INPATIENT)
Facility: HOSPITAL | Age: 50
LOS: 5 days | Discharge: HOME HEALTH | DRG: 872 | End: 2025-08-20
Attending: EMERGENCY MEDICINE | Admitting: INTERNAL MEDICINE
Payer: COMMERCIAL

## 2025-08-15 DIAGNOSIS — K86.1 CHRONIC PANCREATITIS, UNSPECIFIED PANCREATITIS TYPE (HCC): ICD-10-CM

## 2025-08-15 DIAGNOSIS — R79.89 ELEVATED TROPONIN: ICD-10-CM

## 2025-08-15 DIAGNOSIS — A41.9 SEPSIS (HCC): ICD-10-CM

## 2025-08-15 DIAGNOSIS — R10.9 FLANK PAIN: ICD-10-CM

## 2025-08-15 DIAGNOSIS — R11.2 NAUSEA AND VOMITING: ICD-10-CM

## 2025-08-15 DIAGNOSIS — N17.9 AKI (ACUTE KIDNEY INJURY) (HCC): Primary | ICD-10-CM

## 2025-08-15 PROBLEM — R10.13 EPIGASTRIC PAIN: Status: ACTIVE | Noted: 2025-08-15

## 2025-08-15 PROBLEM — Z91.89 AT RISK FOR POLYPHARMACY: Status: ACTIVE | Noted: 2025-08-15

## 2025-08-15 LAB
2HR DELTA HS TROPONIN: -17 NG/L
4HR DELTA HS TROPONIN: -18 NG/L
ALBUMIN SERPL BCG-MCNC: 3.6 G/DL (ref 3.5–5)
ALP SERPL-CCNC: 116 U/L (ref 34–104)
ALT SERPL W P-5'-P-CCNC: 40 U/L (ref 7–52)
AMORPH URATE CRY URNS QL MICRO: ABNORMAL
ANION GAP SERPL CALCULATED.3IONS-SCNC: 13 MMOL/L (ref 4–13)
APTT PPP: 34 SECONDS (ref 23–34)
AST SERPL W P-5'-P-CCNC: 59 U/L (ref 13–39)
BACTERIA UR QL AUTO: ABNORMAL /HPF
BASOPHILS # BLD AUTO: 0.07 THOUSANDS/ÂΜL (ref 0–0.1)
BASOPHILS NFR BLD AUTO: 1 % (ref 0–1)
BILIRUB SERPL-MCNC: 0.79 MG/DL (ref 0.2–1)
BILIRUB UR QL STRIP: ABNORMAL
BUN SERPL-MCNC: 49 MG/DL (ref 5–25)
CALCIUM SERPL-MCNC: 8.7 MG/DL (ref 8.4–10.2)
CAOX CRY URNS QL MICRO: ABNORMAL /HPF
CARDIAC TROPONIN I PNL SERPL HS: 105 NG/L (ref ?–50)
CARDIAC TROPONIN I PNL SERPL HS: 106 NG/L (ref ?–50)
CARDIAC TROPONIN I PNL SERPL HS: 123 NG/L (ref ?–50)
CHLORIDE SERPL-SCNC: 92 MMOL/L (ref 96–108)
CLARITY UR: ABNORMAL
CO2 SERPL-SCNC: 29 MMOL/L (ref 21–32)
COLOR UR: YELLOW
CREAT SERPL-MCNC: 3 MG/DL (ref 0.6–1.3)
D DIMER PPP FEU-MCNC: 0.72 UG/ML FEU
EOSINOPHIL # BLD AUTO: 0.11 THOUSAND/ÂΜL (ref 0–0.61)
EOSINOPHIL NFR BLD AUTO: 1 % (ref 0–6)
ERYTHROCYTE [DISTWIDTH] IN BLOOD BY AUTOMATED COUNT: 14 % (ref 11.6–15.1)
FLUAV RNA RESP QL NAA+PROBE: NEGATIVE
FLUBV RNA RESP QL NAA+PROBE: NEGATIVE
GFR SERPL CREATININE-BSD FRML MDRD: 17 ML/MIN/1.73SQ M
GLUCOSE SERPL-MCNC: 155 MG/DL (ref 65–140)
GLUCOSE SERPL-MCNC: 193 MG/DL (ref 65–140)
GLUCOSE SERPL-MCNC: 214 MG/DL (ref 65–140)
GLUCOSE UR STRIP-MCNC: NEGATIVE MG/DL
HCG SERPL QL: NEGATIVE
HCT VFR BLD AUTO: 46.3 % (ref 34.8–46.1)
HGB BLD-MCNC: 15.2 G/DL (ref 11.5–15.4)
HGB UR QL STRIP.AUTO: NEGATIVE
IMM GRANULOCYTES # BLD AUTO: 0.07 THOUSAND/UL (ref 0–0.2)
IMM GRANULOCYTES NFR BLD AUTO: 1 % (ref 0–2)
INR PPP: 1.11 (ref 0.85–1.19)
KETONES UR STRIP-MCNC: ABNORMAL MG/DL
LACTATE SERPL-SCNC: 0.9 MMOL/L (ref 0.5–2)
LEUKOCYTE ESTERASE UR QL STRIP: ABNORMAL
LIPASE SERPL-CCNC: <6 U/L (ref 11–82)
LYMPHOCYTES # BLD AUTO: 3.63 THOUSANDS/ÂΜL (ref 0.6–4.47)
LYMPHOCYTES NFR BLD AUTO: 41 % (ref 14–44)
MAGNESIUM SERPL-MCNC: 2.3 MG/DL (ref 1.9–2.7)
MCH RBC QN AUTO: 30.8 PG (ref 26.8–34.3)
MCHC RBC AUTO-ENTMCNC: 32.8 G/DL (ref 31.4–37.4)
MCV RBC AUTO: 94 FL (ref 82–98)
MONOCYTES # BLD AUTO: 0.73 THOUSAND/ÂΜL (ref 0.17–1.22)
MONOCYTES NFR BLD AUTO: 8 % (ref 4–12)
NEUTROPHILS # BLD AUTO: 4.36 THOUSANDS/ÂΜL (ref 1.85–7.62)
NEUTS SEG NFR BLD AUTO: 48 % (ref 43–75)
NITRITE UR QL STRIP: NEGATIVE
NON-SQ EPI CELLS URNS QL MICRO: ABNORMAL /HPF
NRBC BLD AUTO-RTO: 0 /100 WBCS
PH UR STRIP.AUTO: 5 [PH]
PLATELET # BLD AUTO: 193 THOUSANDS/UL (ref 149–390)
PMV BLD AUTO: 10.6 FL (ref 8.9–12.7)
POTASSIUM SERPL-SCNC: 3.9 MMOL/L (ref 3.5–5.3)
PROCALCITONIN SERPL-MCNC: 0.22 NG/ML
PROT SERPL-MCNC: 6.9 G/DL (ref 6.4–8.4)
PROT UR STRIP-MCNC: ABNORMAL MG/DL
PROTHROMBIN TIME: 14.9 SECONDS (ref 12.3–15)
RBC # BLD AUTO: 4.94 MILLION/UL (ref 3.81–5.12)
RBC #/AREA URNS AUTO: ABNORMAL /HPF
RSV RNA RESP QL NAA+PROBE: NEGATIVE
SARS-COV-2 RNA RESP QL NAA+PROBE: NEGATIVE
SODIUM SERPL-SCNC: 134 MMOL/L (ref 135–147)
SP GR UR STRIP.AUTO: 1.03 (ref 1–1.03)
UROBILINOGEN UR STRIP-ACNC: 2 MG/DL
WBC # BLD AUTO: 8.97 THOUSAND/UL (ref 4.31–10.16)
WBC #/AREA URNS AUTO: ABNORMAL /HPF

## 2025-08-15 PROCEDURE — 96376 TX/PRO/DX INJ SAME DRUG ADON: CPT

## 2025-08-15 PROCEDURE — 96361 HYDRATE IV INFUSION ADD-ON: CPT

## 2025-08-15 PROCEDURE — 99223 1ST HOSP IP/OBS HIGH 75: CPT | Performed by: INTERNAL MEDICINE

## 2025-08-15 PROCEDURE — 82948 REAGENT STRIP/BLOOD GLUCOSE: CPT

## 2025-08-15 PROCEDURE — 96367 TX/PROPH/DG ADDL SEQ IV INF: CPT

## 2025-08-15 PROCEDURE — 85610 PROTHROMBIN TIME: CPT | Performed by: EMERGENCY MEDICINE

## 2025-08-15 PROCEDURE — 84703 CHORIONIC GONADOTROPIN ASSAY: CPT | Performed by: EMERGENCY MEDICINE

## 2025-08-15 PROCEDURE — 83735 ASSAY OF MAGNESIUM: CPT | Performed by: EMERGENCY MEDICINE

## 2025-08-15 PROCEDURE — 80197 ASSAY OF TACROLIMUS: CPT | Performed by: EMERGENCY MEDICINE

## 2025-08-15 PROCEDURE — 99285 EMERGENCY DEPT VISIT HI MDM: CPT | Performed by: EMERGENCY MEDICINE

## 2025-08-15 PROCEDURE — 84484 ASSAY OF TROPONIN QUANT: CPT | Performed by: INTERNAL MEDICINE

## 2025-08-15 PROCEDURE — 85730 THROMBOPLASTIN TIME PARTIAL: CPT | Performed by: EMERGENCY MEDICINE

## 2025-08-15 PROCEDURE — 84145 PROCALCITONIN (PCT): CPT | Performed by: EMERGENCY MEDICINE

## 2025-08-15 PROCEDURE — 83036 HEMOGLOBIN GLYCOSYLATED A1C: CPT | Performed by: INTERNAL MEDICINE

## 2025-08-15 PROCEDURE — 36415 COLL VENOUS BLD VENIPUNCTURE: CPT | Performed by: EMERGENCY MEDICINE

## 2025-08-15 PROCEDURE — 83605 ASSAY OF LACTIC ACID: CPT | Performed by: EMERGENCY MEDICINE

## 2025-08-15 PROCEDURE — 99284 EMERGENCY DEPT VISIT MOD MDM: CPT

## 2025-08-15 PROCEDURE — 71045 X-RAY EXAM CHEST 1 VIEW: CPT

## 2025-08-15 PROCEDURE — 96375 TX/PRO/DX INJ NEW DRUG ADDON: CPT

## 2025-08-15 PROCEDURE — 87637 SARSCOV2&INF A&B&RSV AMP PRB: CPT | Performed by: EMERGENCY MEDICINE

## 2025-08-15 PROCEDURE — 96365 THER/PROPH/DIAG IV INF INIT: CPT

## 2025-08-15 PROCEDURE — 81001 URINALYSIS AUTO W/SCOPE: CPT | Performed by: INTERNAL MEDICINE

## 2025-08-15 PROCEDURE — 71250 CT THORAX DX C-: CPT

## 2025-08-15 PROCEDURE — 80053 COMPREHEN METABOLIC PANEL: CPT | Performed by: EMERGENCY MEDICINE

## 2025-08-15 PROCEDURE — 83690 ASSAY OF LIPASE: CPT | Performed by: EMERGENCY MEDICINE

## 2025-08-15 PROCEDURE — 85379 FIBRIN DEGRADATION QUANT: CPT | Performed by: INTERNAL MEDICINE

## 2025-08-15 PROCEDURE — 94640 AIRWAY INHALATION TREATMENT: CPT

## 2025-08-15 PROCEDURE — 87040 BLOOD CULTURE FOR BACTERIA: CPT | Performed by: EMERGENCY MEDICINE

## 2025-08-15 PROCEDURE — 85025 COMPLETE CBC W/AUTO DIFF WBC: CPT | Performed by: EMERGENCY MEDICINE

## 2025-08-15 PROCEDURE — 93005 ELECTROCARDIOGRAM TRACING: CPT

## 2025-08-15 PROCEDURE — 84484 ASSAY OF TROPONIN QUANT: CPT | Performed by: EMERGENCY MEDICINE

## 2025-08-15 PROCEDURE — 74176 CT ABD & PELVIS W/O CONTRAST: CPT

## 2025-08-15 RX ORDER — METOCLOPRAMIDE HYDROCHLORIDE 5 MG/ML
10 INJECTION INTRAMUSCULAR; INTRAVENOUS ONCE
Status: COMPLETED | OUTPATIENT
Start: 2025-08-15 | End: 2025-08-15

## 2025-08-15 RX ORDER — ASCORBIC ACID 500 MG
250 TABLET ORAL DAILY
Status: DISCONTINUED | OUTPATIENT
Start: 2025-08-16 | End: 2025-08-20 | Stop reason: HOSPADM

## 2025-08-15 RX ORDER — PREDNISONE 20 MG/1
20 TABLET ORAL DAILY
Status: DISCONTINUED | OUTPATIENT
Start: 2025-08-15 | End: 2025-08-20 | Stop reason: HOSPADM

## 2025-08-15 RX ORDER — ATOVAQUONE 750 MG/5ML
1500 SUSPENSION ORAL DAILY
Status: DISCONTINUED | OUTPATIENT
Start: 2025-08-16 | End: 2025-08-20 | Stop reason: HOSPADM

## 2025-08-15 RX ORDER — AZITHROMYCIN 500 MG/1
250 TABLET, FILM COATED ORAL 3 TIMES WEEKLY
Status: DISCONTINUED | OUTPATIENT
Start: 2025-08-15 | End: 2025-08-20 | Stop reason: HOSPADM

## 2025-08-15 RX ORDER — INSULIN LISPRO 100 [IU]/ML
10 INJECTION, SOLUTION INTRAVENOUS; SUBCUTANEOUS
Status: DISCONTINUED | OUTPATIENT
Start: 2025-08-16 | End: 2025-08-20 | Stop reason: HOSPADM

## 2025-08-15 RX ORDER — OXYBUTYNIN CHLORIDE 5 MG/1
15 TABLET, EXTENDED RELEASE ORAL
Status: DISCONTINUED | OUTPATIENT
Start: 2025-08-15 | End: 2025-08-15 | Stop reason: SDUPTHER

## 2025-08-15 RX ORDER — INSULIN LISPRO 100 [IU]/ML
1-5 INJECTION, SOLUTION INTRAVENOUS; SUBCUTANEOUS
Status: DISCONTINUED | OUTPATIENT
Start: 2025-08-16 | End: 2025-08-20 | Stop reason: HOSPADM

## 2025-08-15 RX ORDER — FENTANYL CITRATE 50 UG/ML
75 INJECTION, SOLUTION INTRAMUSCULAR; INTRAVENOUS ONCE
Refills: 0 | Status: COMPLETED | OUTPATIENT
Start: 2025-08-15 | End: 2025-08-15

## 2025-08-15 RX ORDER — INSULIN LISPRO 100 [IU]/ML
12 INJECTION, SOLUTION INTRAVENOUS; SUBCUTANEOUS
Status: DISCONTINUED | OUTPATIENT
Start: 2025-08-16 | End: 2025-08-20 | Stop reason: HOSPADM

## 2025-08-15 RX ORDER — SODIUM CHLORIDE, SODIUM GLUCONATE, SODIUM ACETATE, POTASSIUM CHLORIDE, MAGNESIUM CHLORIDE, SODIUM PHOSPHATE, DIBASIC, AND POTASSIUM PHOSPHATE .53; .5; .37; .037; .03; .012; .00082 G/100ML; G/100ML; G/100ML; G/100ML; G/100ML; G/100ML; G/100ML
1000 INJECTION, SOLUTION INTRAVENOUS ONCE
Status: COMPLETED | OUTPATIENT
Start: 2025-08-15 | End: 2025-08-15

## 2025-08-15 RX ORDER — IPRATROPIUM BROMIDE AND ALBUTEROL SULFATE 2.5; .5 MG/3ML; MG/3ML
3 SOLUTION RESPIRATORY (INHALATION) ONCE
Status: COMPLETED | OUTPATIENT
Start: 2025-08-15 | End: 2025-08-15

## 2025-08-15 RX ORDER — SODIUM CHLORIDE, SODIUM GLUCONATE, SODIUM ACETATE, POTASSIUM CHLORIDE, MAGNESIUM CHLORIDE, SODIUM PHOSPHATE, DIBASIC, AND POTASSIUM PHOSPHATE .53; .5; .37; .037; .03; .012; .00082 G/100ML; G/100ML; G/100ML; G/100ML; G/100ML; G/100ML; G/100ML
75 INJECTION, SOLUTION INTRAVENOUS CONTINUOUS
Status: DISCONTINUED | OUTPATIENT
Start: 2025-08-15 | End: 2025-08-16

## 2025-08-15 RX ORDER — ALBUTEROL SULFATE 90 UG/1
2 INHALANT RESPIRATORY (INHALATION) EVERY 4 HOURS PRN
Status: DISCONTINUED | OUTPATIENT
Start: 2025-08-15 | End: 2025-08-20 | Stop reason: HOSPADM

## 2025-08-15 RX ORDER — ALBUTEROL SULFATE 0.83 MG/ML
5 SOLUTION RESPIRATORY (INHALATION)
Status: DISCONTINUED | OUTPATIENT
Start: 2025-08-15 | End: 2025-08-20 | Stop reason: HOSPADM

## 2025-08-15 RX ORDER — BUDESONIDE AND FORMOTEROL FUMARATE DIHYDRATE 160; 4.5 UG/1; UG/1
2 AEROSOL RESPIRATORY (INHALATION) EVERY 12 HOURS
Status: DISCONTINUED | OUTPATIENT
Start: 2025-08-15 | End: 2025-08-20 | Stop reason: HOSPADM

## 2025-08-15 RX ORDER — POLYETHYLENE GLYCOL 3350 17 G/17G
17 POWDER, FOR SOLUTION ORAL 2 TIMES DAILY
Status: DISCONTINUED | OUTPATIENT
Start: 2025-08-15 | End: 2025-08-20 | Stop reason: HOSPADM

## 2025-08-15 RX ORDER — MAGNESIUM HYDROXIDE/ALUMINUM HYDROXICE/SIMETHICONE 120; 1200; 1200 MG/30ML; MG/30ML; MG/30ML
30 SUSPENSION ORAL EVERY 6 HOURS PRN
Status: DISCONTINUED | OUTPATIENT
Start: 2025-08-15 | End: 2025-08-20 | Stop reason: HOSPADM

## 2025-08-15 RX ORDER — ONDANSETRON 2 MG/ML
4 INJECTION INTRAMUSCULAR; INTRAVENOUS ONCE
Status: COMPLETED | OUTPATIENT
Start: 2025-08-15 | End: 2025-08-15

## 2025-08-15 RX ORDER — DRONABINOL 5 MG/1
5 CAPSULE ORAL 3 TIMES DAILY
Status: DISCONTINUED | OUTPATIENT
Start: 2025-08-15 | End: 2025-08-20 | Stop reason: HOSPADM

## 2025-08-15 RX ORDER — ESCITALOPRAM OXALATE 10 MG/1
15 TABLET ORAL DAILY
Status: DISCONTINUED | OUTPATIENT
Start: 2025-08-16 | End: 2025-08-20 | Stop reason: HOSPADM

## 2025-08-15 RX ORDER — SODIUM CHLORIDE, SODIUM GLUCONATE, SODIUM ACETATE, POTASSIUM CHLORIDE, MAGNESIUM CHLORIDE, SODIUM PHOSPHATE, DIBASIC, AND POTASSIUM PHOSPHATE .53; .5; .37; .037; .03; .012; .00082 G/100ML; G/100ML; G/100ML; G/100ML; G/100ML; G/100ML; G/100ML
1000 INJECTION, SOLUTION INTRAVENOUS ONCE
Status: COMPLETED | OUTPATIENT
Start: 2025-08-15 | End: 2025-08-16

## 2025-08-15 RX ORDER — OXYBUTYNIN CHLORIDE 5 MG/1
5 TABLET, EXTENDED RELEASE ORAL DAILY
Status: DISCONTINUED | OUTPATIENT
Start: 2025-08-16 | End: 2025-08-20 | Stop reason: HOSPADM

## 2025-08-15 RX ORDER — INSULIN LISPRO 100 [IU]/ML
1-5 INJECTION, SOLUTION INTRAVENOUS; SUBCUTANEOUS
Status: DISCONTINUED | OUTPATIENT
Start: 2025-08-15 | End: 2025-08-20 | Stop reason: HOSPADM

## 2025-08-15 RX ORDER — INSULIN GLARGINE 100 [IU]/ML
12 INJECTION, SOLUTION SUBCUTANEOUS EVERY MORNING
Status: DISCONTINUED | OUTPATIENT
Start: 2025-08-16 | End: 2025-08-20 | Stop reason: HOSPADM

## 2025-08-15 RX ORDER — TACROLIMUS 0.5 MG/1
3 CAPSULE ORAL 2 TIMES DAILY
Status: DISCONTINUED | OUTPATIENT
Start: 2025-08-15 | End: 2025-08-18

## 2025-08-15 RX ORDER — PREGABALIN 75 MG/1
150 CAPSULE ORAL DAILY
Status: DISCONTINUED | OUTPATIENT
Start: 2025-08-16 | End: 2025-08-20 | Stop reason: HOSPADM

## 2025-08-15 RX ORDER — DIPHENHYDRAMINE HYDROCHLORIDE 50 MG/ML
25 INJECTION, SOLUTION INTRAMUSCULAR; INTRAVENOUS ONCE
Status: COMPLETED | OUTPATIENT
Start: 2025-08-15 | End: 2025-08-15

## 2025-08-15 RX ORDER — ACETAMINOPHEN 325 MG/1
650 TABLET ORAL EVERY 6 HOURS PRN
Status: DISCONTINUED | OUTPATIENT
Start: 2025-08-15 | End: 2025-08-16

## 2025-08-15 RX ORDER — PANTOPRAZOLE SODIUM 40 MG/10ML
40 INJECTION, POWDER, LYOPHILIZED, FOR SOLUTION INTRAVENOUS ONCE
Status: COMPLETED | OUTPATIENT
Start: 2025-08-15 | End: 2025-08-15

## 2025-08-15 RX ORDER — HYDROMORPHONE HCL/PF 1 MG/ML
0.5 SYRINGE (ML) INJECTION ONCE
Status: COMPLETED | OUTPATIENT
Start: 2025-08-15 | End: 2025-08-15

## 2025-08-15 RX ORDER — DOCUSATE SODIUM 100 MG/1
100 CAPSULE, LIQUID FILLED ORAL 2 TIMES DAILY
Status: DISCONTINUED | OUTPATIENT
Start: 2025-08-15 | End: 2025-08-20 | Stop reason: HOSPADM

## 2025-08-15 RX ORDER — FERROUS SULFATE 325(65) MG
325 TABLET ORAL EVERY OTHER DAY
Status: DISCONTINUED | OUTPATIENT
Start: 2025-08-15 | End: 2025-08-20 | Stop reason: HOSPADM

## 2025-08-15 RX ORDER — PANTOPRAZOLE SODIUM 40 MG/1
40 TABLET, DELAYED RELEASE ORAL
Status: DISCONTINUED | OUTPATIENT
Start: 2025-08-16 | End: 2025-08-16

## 2025-08-15 RX ORDER — FENTANYL CITRATE 50 UG/ML
50 INJECTION, SOLUTION INTRAMUSCULAR; INTRAVENOUS ONCE
Refills: 0 | Status: COMPLETED | OUTPATIENT
Start: 2025-08-15 | End: 2025-08-15

## 2025-08-15 RX ORDER — NYSTATIN 100000 [USP'U]/ML
500000 SUSPENSION ORAL 4 TIMES DAILY
Status: DISCONTINUED | OUTPATIENT
Start: 2025-08-15 | End: 2025-08-20 | Stop reason: HOSPADM

## 2025-08-15 RX ORDER — INSULIN LISPRO 100 [IU]/ML
8 INJECTION, SOLUTION INTRAVENOUS; SUBCUTANEOUS
Status: DISCONTINUED | OUTPATIENT
Start: 2025-08-16 | End: 2025-08-20 | Stop reason: HOSPADM

## 2025-08-15 RX ORDER — MYCOPHENOLATE MOFETIL 250 MG/1
250 CAPSULE ORAL 2 TIMES DAILY
Status: DISCONTINUED | OUTPATIENT
Start: 2025-08-15 | End: 2025-08-20 | Stop reason: HOSPADM

## 2025-08-15 RX ORDER — ONDANSETRON 2 MG/ML
4 INJECTION INTRAMUSCULAR; INTRAVENOUS EVERY 6 HOURS PRN
Status: DISCONTINUED | OUTPATIENT
Start: 2025-08-15 | End: 2025-08-20 | Stop reason: HOSPADM

## 2025-08-15 RX ADMIN — ONDANSETRON 4 MG: 2 INJECTION INTRAMUSCULAR; INTRAVENOUS at 23:16

## 2025-08-15 RX ADMIN — IPRATROPIUM BROMIDE AND ALBUTEROL SULFATE 3 ML: 2.5; .5 SOLUTION RESPIRATORY (INHALATION) at 16:54

## 2025-08-15 RX ADMIN — APIXABAN 5 MG: 5 TABLET, FILM COATED ORAL at 22:57

## 2025-08-15 RX ADMIN — FERROUS SULFATE TAB 325 MG (65 MG ELEMENTAL FE) 325 MG: 325 (65 FE) TAB at 22:57

## 2025-08-15 RX ADMIN — SODIUM CHLORIDE, SODIUM GLUCONATE, SODIUM ACETATE, POTASSIUM CHLORIDE, MAGNESIUM CHLORIDE, SODIUM PHOSPHATE, DIBASIC, AND POTASSIUM PHOSPHATE 75 ML/HR: .53; .5; .37; .037; .03; .012; .00082 INJECTION, SOLUTION INTRAVENOUS at 20:41

## 2025-08-15 RX ADMIN — MYCOPHENOLATE MOFETIL 250 MG: 250 CAPSULE ORAL at 22:57

## 2025-08-15 RX ADMIN — Medication 500 MG: at 22:57

## 2025-08-15 RX ADMIN — ONDANSETRON 4 MG: 2 INJECTION INTRAMUSCULAR; INTRAVENOUS at 18:03

## 2025-08-15 RX ADMIN — SODIUM CHLORIDE 1000 ML: 0.9 INJECTION, SOLUTION INTRAVENOUS at 17:24

## 2025-08-15 RX ADMIN — AZITHROMYCIN 250 MG: 500 TABLET, FILM COATED ORAL at 22:57

## 2025-08-15 RX ADMIN — CEFEPIME 2000 MG: 2 INJECTION, POWDER, FOR SOLUTION INTRAVENOUS at 16:54

## 2025-08-15 RX ADMIN — TACROLIMUS 3 MG: 0.5 CAPSULE ORAL at 22:57

## 2025-08-15 RX ADMIN — PANTOPRAZOLE SODIUM 40 MG: 40 INJECTION, POWDER, FOR SOLUTION INTRAVENOUS at 19:50

## 2025-08-15 RX ADMIN — HYDROMORPHONE HYDROCHLORIDE 0.5 MG: 0.5 INJECTION, SOLUTION INTRAMUSCULAR; INTRAVENOUS; SUBCUTANEOUS at 20:39

## 2025-08-15 RX ADMIN — FENTANYL CITRATE 75 MCG: 50 INJECTION INTRAMUSCULAR; INTRAVENOUS at 18:03

## 2025-08-15 RX ADMIN — DIPHENHYDRAMINE HYDROCHLORIDE 25 MG: 50 INJECTION, SOLUTION INTRAMUSCULAR; INTRAVENOUS at 19:49

## 2025-08-15 RX ADMIN — SODIUM CHLORIDE, SODIUM LACTATE, POTASSIUM CHLORIDE, AND CALCIUM CHLORIDE 1000 ML: .6; .31; .03; .02 INJECTION, SOLUTION INTRAVENOUS at 18:52

## 2025-08-15 RX ADMIN — PREDNISONE 20 MG: 20 TABLET ORAL at 22:57

## 2025-08-15 RX ADMIN — METOCLOPRAMIDE 10 MG: 5 INJECTION, SOLUTION INTRAMUSCULAR; INTRAVENOUS at 19:50

## 2025-08-15 RX ADMIN — INSULIN LISPRO 1 UNITS: 100 INJECTION, SOLUTION INTRAVENOUS; SUBCUTANEOUS at 23:08

## 2025-08-15 RX ADMIN — FENTANYL CITRATE 50 MCG: 50 INJECTION INTRAMUSCULAR; INTRAVENOUS at 16:32

## 2025-08-15 RX ADMIN — ONDANSETRON 4 MG: 2 INJECTION INTRAMUSCULAR; INTRAVENOUS at 16:32

## 2025-08-15 RX ADMIN — SODIUM CHLORIDE, SODIUM GLUCONATE, SODIUM ACETATE, POTASSIUM CHLORIDE, MAGNESIUM CHLORIDE, SODIUM PHOSPHATE, DIBASIC, AND POTASSIUM PHOSPHATE 1000 ML: .53; .5; .37; .037; .03; .012; .00082 INJECTION, SOLUTION INTRAVENOUS at 23:16

## 2025-08-16 LAB
ALBUMIN SERPL BCG-MCNC: 3 G/DL (ref 3.5–5)
ALP SERPL-CCNC: 95 U/L (ref 34–104)
ALT SERPL W P-5'-P-CCNC: 32 U/L (ref 7–52)
ANION GAP SERPL CALCULATED.3IONS-SCNC: 11 MMOL/L (ref 4–13)
AST SERPL W P-5'-P-CCNC: 45 U/L (ref 13–39)
ATRIAL RATE: 125 BPM
ATRIAL RATE: 132 BPM
ATRIAL RATE: 132 BPM
ATRIAL RATE: 133 BPM
BASOPHILS # BLD AUTO: 0.04 THOUSANDS/ÂΜL (ref 0–0.1)
BASOPHILS NFR BLD AUTO: 1 % (ref 0–1)
BILIRUB SERPL-MCNC: 0.62 MG/DL (ref 0.2–1)
BUN SERPL-MCNC: 46 MG/DL (ref 5–25)
CALCIUM ALBUM COR SERPL-MCNC: 8.4 MG/DL (ref 8.3–10.1)
CALCIUM SERPL-MCNC: 7.6 MG/DL (ref 8.4–10.2)
CARDIAC TROPONIN I PNL SERPL HS: 79 NG/L (ref 8–18)
CHLORIDE SERPL-SCNC: 99 MMOL/L (ref 96–108)
CO2 SERPL-SCNC: 26 MMOL/L (ref 21–32)
CREAT SERPL-MCNC: 2.32 MG/DL (ref 0.6–1.3)
EOSINOPHIL # BLD AUTO: 0.06 THOUSAND/ÂΜL (ref 0–0.61)
EOSINOPHIL NFR BLD AUTO: 1 % (ref 0–6)
ERYTHROCYTE [DISTWIDTH] IN BLOOD BY AUTOMATED COUNT: 13.9 % (ref 11.6–15.1)
EST. AVERAGE GLUCOSE BLD GHB EST-MCNC: 171 MG/DL
GFR SERPL CREATININE-BSD FRML MDRD: 24 ML/MIN/1.73SQ M
GLUCOSE SERPL-MCNC: 107 MG/DL (ref 65–140)
GLUCOSE SERPL-MCNC: 119 MG/DL (ref 65–140)
GLUCOSE SERPL-MCNC: 171 MG/DL (ref 65–140)
GLUCOSE SERPL-MCNC: 85 MG/DL (ref 65–140)
GLUCOSE SERPL-MCNC: 96 MG/DL (ref 65–140)
HBA1C MFR BLD: 7.6 %
HCT VFR BLD AUTO: 38.6 % (ref 34.8–46.1)
HGB BLD-MCNC: 12.2 G/DL (ref 11.5–15.4)
IMM GRANULOCYTES # BLD AUTO: 0.03 THOUSAND/UL (ref 0–0.2)
IMM GRANULOCYTES NFR BLD AUTO: 1 % (ref 0–2)
LYMPHOCYTES # BLD AUTO: 1.84 THOUSANDS/ÂΜL (ref 0.6–4.47)
LYMPHOCYTES NFR BLD AUTO: 34 % (ref 14–44)
MAGNESIUM SERPL-MCNC: 1.9 MG/DL (ref 1.9–2.7)
MCH RBC QN AUTO: 30.7 PG (ref 26.8–34.3)
MCHC RBC AUTO-ENTMCNC: 31.6 G/DL (ref 31.4–37.4)
MCV RBC AUTO: 97 FL (ref 82–98)
MONOCYTES # BLD AUTO: 0.58 THOUSAND/ÂΜL (ref 0.17–1.22)
MONOCYTES NFR BLD AUTO: 11 % (ref 4–12)
NEUTROPHILS # BLD AUTO: 2.82 THOUSANDS/ÂΜL (ref 1.85–7.62)
NEUTS SEG NFR BLD AUTO: 52 % (ref 43–75)
NRBC BLD AUTO-RTO: 0 /100 WBCS
P AXIS: -3 DEGREES
P AXIS: 5 DEGREES
P AXIS: 56 DEGREES
P AXIS: 6 DEGREES
PLATELET # BLD AUTO: 141 THOUSANDS/UL (ref 149–390)
PMV BLD AUTO: 10.4 FL (ref 8.9–12.7)
POTASSIUM SERPL-SCNC: 4.4 MMOL/L (ref 3.5–5.3)
PR INTERVAL: 128 MS
PR INTERVAL: 160 MS
PR INTERVAL: 182 MS
PR INTERVAL: 192 MS
PROCALCITONIN SERPL-MCNC: 0.68 NG/ML
PROT SERPL-MCNC: 5.7 G/DL (ref 6.4–8.4)
QRS AXIS: 69 DEGREES
QRS AXIS: 71 DEGREES
QRS AXIS: 73 DEGREES
QRS AXIS: 95 DEGREES
QRSD INTERVAL: 60 MS
QRSD INTERVAL: 70 MS
QT INTERVAL: 298 MS
QT INTERVAL: 298 MS
QT INTERVAL: 310 MS
QT INTERVAL: 336 MS
QTC INTERVAL: 442 MS
QTC INTERVAL: 443 MS
QTC INTERVAL: 460 MS
QTC INTERVAL: 484 MS
RBC # BLD AUTO: 3.98 MILLION/UL (ref 3.81–5.12)
SODIUM SERPL-SCNC: 136 MMOL/L (ref 135–147)
T WAVE AXIS: 228 DEGREES
T WAVE AXIS: 233 DEGREES
T WAVE AXIS: 233 DEGREES
T WAVE AXIS: 249 DEGREES
TACROLIMUS BLD-MCNC: 25.8 NG/ML (ref 3–15)
VENTRICULAR RATE: 125 BPM
VENTRICULAR RATE: 132 BPM
VENTRICULAR RATE: 132 BPM
VENTRICULAR RATE: 133 BPM
WBC # BLD AUTO: 5.37 THOUSAND/UL (ref 4.31–10.16)

## 2025-08-16 PROCEDURE — 93010 ELECTROCARDIOGRAM REPORT: CPT | Performed by: INTERNAL MEDICINE

## 2025-08-16 PROCEDURE — 93005 ELECTROCARDIOGRAM TRACING: CPT

## 2025-08-16 PROCEDURE — 84484 ASSAY OF TROPONIN QUANT: CPT | Performed by: INTERNAL MEDICINE

## 2025-08-16 PROCEDURE — 84145 PROCALCITONIN (PCT): CPT | Performed by: INTERNAL MEDICINE

## 2025-08-16 PROCEDURE — 85025 COMPLETE CBC W/AUTO DIFF WBC: CPT | Performed by: INTERNAL MEDICINE

## 2025-08-16 PROCEDURE — 94760 N-INVAS EAR/PLS OXIMETRY 1: CPT

## 2025-08-16 PROCEDURE — 83735 ASSAY OF MAGNESIUM: CPT | Performed by: INTERNAL MEDICINE

## 2025-08-16 PROCEDURE — 99255 IP/OBS CONSLTJ NEW/EST HI 80: CPT

## 2025-08-16 PROCEDURE — 99233 SBSQ HOSP IP/OBS HIGH 50: CPT | Performed by: FAMILY MEDICINE

## 2025-08-16 PROCEDURE — 94640 AIRWAY INHALATION TREATMENT: CPT

## 2025-08-16 PROCEDURE — 99223 1ST HOSP IP/OBS HIGH 75: CPT | Performed by: INTERNAL MEDICINE

## 2025-08-16 PROCEDURE — 82948 REAGENT STRIP/BLOOD GLUCOSE: CPT

## 2025-08-16 PROCEDURE — 80053 COMPREHEN METABOLIC PANEL: CPT | Performed by: INTERNAL MEDICINE

## 2025-08-16 RX ORDER — HYDROMORPHONE HCL IN WATER/PF 6 MG/30 ML
0.2 PATIENT CONTROLLED ANALGESIA SYRINGE INTRAVENOUS EVERY 6 HOURS PRN
Status: DISCONTINUED | OUTPATIENT
Start: 2025-08-16 | End: 2025-08-17

## 2025-08-16 RX ORDER — DICYCLOMINE HYDROCHLORIDE 10 MG/1
10 CAPSULE ORAL
Status: DISCONTINUED | OUTPATIENT
Start: 2025-08-16 | End: 2025-08-20 | Stop reason: HOSPADM

## 2025-08-16 RX ORDER — HYDROMORPHONE HCL/PF 1 MG/ML
0.5 SYRINGE (ML) INJECTION ONCE
Status: COMPLETED | OUTPATIENT
Start: 2025-08-16 | End: 2025-08-16

## 2025-08-16 RX ORDER — METOCLOPRAMIDE HYDROCHLORIDE 5 MG/ML
5 INJECTION INTRAMUSCULAR; INTRAVENOUS EVERY 6 HOURS SCHEDULED
Status: DISCONTINUED | OUTPATIENT
Start: 2025-08-16 | End: 2025-08-20 | Stop reason: HOSPADM

## 2025-08-16 RX ORDER — SODIUM CHLORIDE, SODIUM GLUCONATE, SODIUM ACETATE, POTASSIUM CHLORIDE, MAGNESIUM CHLORIDE, SODIUM PHOSPHATE, DIBASIC, AND POTASSIUM PHOSPHATE .53; .5; .37; .037; .03; .012; .00082 G/100ML; G/100ML; G/100ML; G/100ML; G/100ML; G/100ML; G/100ML
75 INJECTION, SOLUTION INTRAVENOUS CONTINUOUS
Status: DISPENSED | OUTPATIENT
Start: 2025-08-16 | End: 2025-08-19

## 2025-08-16 RX ORDER — ACETAMINOPHEN 10 MG/ML
1000 INJECTION, SOLUTION INTRAVENOUS EVERY 8 HOURS PRN
Status: ACTIVE | OUTPATIENT
Start: 2025-08-16 | End: 2025-08-18

## 2025-08-16 RX ORDER — FAMOTIDINE 10 MG/ML
20 INJECTION, SOLUTION INTRAVENOUS ONCE
Status: COMPLETED | OUTPATIENT
Start: 2025-08-16 | End: 2025-08-16

## 2025-08-16 RX ORDER — ALPRAZOLAM 0.5 MG
0.5 TABLET ORAL 3 TIMES DAILY PRN
Status: DISCONTINUED | OUTPATIENT
Start: 2025-08-16 | End: 2025-08-20 | Stop reason: HOSPADM

## 2025-08-16 RX ORDER — HYDROMORPHONE HCL IN WATER/PF 6 MG/30 ML
0.2 PATIENT CONTROLLED ANALGESIA SYRINGE INTRAVENOUS ONCE
Status: COMPLETED | OUTPATIENT
Start: 2025-08-16 | End: 2025-08-16

## 2025-08-16 RX ORDER — PANTOPRAZOLE SODIUM 40 MG/10ML
40 INJECTION, POWDER, LYOPHILIZED, FOR SOLUTION INTRAVENOUS EVERY 12 HOURS SCHEDULED
Status: DISCONTINUED | OUTPATIENT
Start: 2025-08-16 | End: 2025-08-20 | Stop reason: HOSPADM

## 2025-08-16 RX ADMIN — CEFTRIAXONE SODIUM 1000 MG: 10 INJECTION, POWDER, FOR SOLUTION INTRAVENOUS at 10:56

## 2025-08-16 RX ADMIN — Medication 5000 UNITS: at 09:21

## 2025-08-16 RX ADMIN — DOCUSATE SODIUM 100 MG: 100 CAPSULE, LIQUID FILLED ORAL at 17:32

## 2025-08-16 RX ADMIN — ALPRAZOLAM 0.5 MG: 0.5 TABLET ORAL at 13:38

## 2025-08-16 RX ADMIN — ESCITALOPRAM OXALATE 15 MG: 10 TABLET ORAL at 09:19

## 2025-08-16 RX ADMIN — NYSTATIN 500000 UNITS: 100000 SUSPENSION ORAL at 10:56

## 2025-08-16 RX ADMIN — DICYCLOMINE HYDROCHLORIDE 10 MG: 10 CAPSULE ORAL at 22:03

## 2025-08-16 RX ADMIN — TACROLIMUS 3 MG: 0.5 CAPSULE ORAL at 09:17

## 2025-08-16 RX ADMIN — INSULIN LISPRO 1 UNITS: 100 INJECTION, SOLUTION INTRAVENOUS; SUBCUTANEOUS at 22:05

## 2025-08-16 RX ADMIN — TACROLIMUS 3 MG: 0.5 CAPSULE ORAL at 17:31

## 2025-08-16 RX ADMIN — DICYCLOMINE HYDROCHLORIDE 10 MG: 10 CAPSULE ORAL at 09:19

## 2025-08-16 RX ADMIN — HYDROMORPHONE HYDROCHLORIDE 0.2 MG: 0.2 INJECTION, SOLUTION INTRAMUSCULAR; INTRAVENOUS; SUBCUTANEOUS at 09:06

## 2025-08-16 RX ADMIN — ACETAMINOPHEN 650 MG: 325 TABLET ORAL at 04:09

## 2025-08-16 RX ADMIN — Medication 2.5 MG: at 12:19

## 2025-08-16 RX ADMIN — PANCRELIPASE 48000 UNITS: 120000; 24000; 76000 CAPSULE, DELAYED RELEASE PELLETS ORAL at 10:56

## 2025-08-16 RX ADMIN — Medication 500 MG: at 17:32

## 2025-08-16 RX ADMIN — APIXABAN 5 MG: 5 TABLET, FILM COATED ORAL at 17:32

## 2025-08-16 RX ADMIN — DICYCLOMINE HYDROCHLORIDE 10 MG: 10 CAPSULE ORAL at 12:19

## 2025-08-16 RX ADMIN — MYCOPHENOLATE MOFETIL 250 MG: 250 CAPSULE ORAL at 17:32

## 2025-08-16 RX ADMIN — PANCRELIPASE 48000 UNITS: 120000; 24000; 76000 CAPSULE, DELAYED RELEASE PELLETS ORAL at 17:33

## 2025-08-16 RX ADMIN — PREDNISONE 20 MG: 20 TABLET ORAL at 09:18

## 2025-08-16 RX ADMIN — TRIMETHOBENZAMIDE HYDROCHLORIDE 200 MG: 100 INJECTION INTRAMUSCULAR at 01:25

## 2025-08-16 RX ADMIN — BUDESONIDE AND FORMOTEROL FUMARATE DIHYDRATE 2 PUFF: 160; 4.5 AEROSOL RESPIRATORY (INHALATION) at 10:56

## 2025-08-16 RX ADMIN — MYCOPHENOLATE MOFETIL 250 MG: 250 CAPSULE ORAL at 09:18

## 2025-08-16 RX ADMIN — METOCLOPRAMIDE 5 MG: 5 INJECTION, SOLUTION INTRAMUSCULAR; INTRAVENOUS at 12:18

## 2025-08-16 RX ADMIN — PREGABALIN 150 MG: 75 CAPSULE ORAL at 09:21

## 2025-08-16 RX ADMIN — FAMOTIDINE 20 MG: 10 INJECTION, SOLUTION INTRAVENOUS at 05:47

## 2025-08-16 RX ADMIN — SODIUM CHLORIDE, SODIUM GLUCONATE, SODIUM ACETATE, POTASSIUM CHLORIDE, MAGNESIUM CHLORIDE, SODIUM PHOSPHATE, DIBASIC, AND POTASSIUM PHOSPHATE 75 ML/HR: .53; .5; .37; .037; .03; .012; .00082 INJECTION, SOLUTION INTRAVENOUS at 10:58

## 2025-08-16 RX ADMIN — ALBUTEROL SULFATE 5 MG: 2.5 SOLUTION RESPIRATORY (INHALATION) at 17:50

## 2025-08-16 RX ADMIN — HYDROMORPHONE HYDROCHLORIDE 0.2 MG: 0.2 INJECTION, SOLUTION INTRAMUSCULAR; INTRAVENOUS; SUBCUTANEOUS at 17:13

## 2025-08-16 RX ADMIN — PANTOPRAZOLE SODIUM 40 MG: 40 INJECTION, POWDER, FOR SOLUTION INTRAVENOUS at 09:21

## 2025-08-16 RX ADMIN — HYDROMORPHONE HYDROCHLORIDE 0.5 MG: 0.5 INJECTION, SOLUTION INTRAMUSCULAR; INTRAVENOUS; SUBCUTANEOUS at 00:53

## 2025-08-16 RX ADMIN — METOCLOPRAMIDE 5 MG: 5 INJECTION, SOLUTION INTRAMUSCULAR; INTRAVENOUS at 17:14

## 2025-08-16 RX ADMIN — Medication 500 MG: at 06:08

## 2025-08-16 RX ADMIN — SODIUM CHLORIDE, SODIUM GLUCONATE, SODIUM ACETATE, POTASSIUM CHLORIDE, MAGNESIUM CHLORIDE, SODIUM PHOSPHATE, DIBASIC, AND POTASSIUM PHOSPHATE 1000 ML: .53; .5; .37; .037; .03; .012; .00082 INJECTION, SOLUTION INTRAVENOUS at 00:16

## 2025-08-16 RX ADMIN — HYDROMORPHONE HYDROCHLORIDE 0.2 MG: 0.2 INJECTION, SOLUTION INTRAMUSCULAR; INTRAVENOUS; SUBCUTANEOUS at 03:57

## 2025-08-16 RX ADMIN — OXYCODONE HYDROCHLORIDE AND ACETAMINOPHEN 250 MG: 500 TABLET ORAL at 09:19

## 2025-08-16 RX ADMIN — BUDESONIDE AND FORMOTEROL FUMARATE DIHYDRATE 2 PUFF: 160; 4.5 AEROSOL RESPIRATORY (INHALATION) at 22:05

## 2025-08-16 RX ADMIN — Medication 500 MG: at 09:18

## 2025-08-16 RX ADMIN — HYDROMORPHONE HYDROCHLORIDE 0.5 MG: 0.5 INJECTION, SOLUTION INTRAMUSCULAR; INTRAVENOUS; SUBCUTANEOUS at 05:46

## 2025-08-16 RX ADMIN — HYDROMORPHONE HYDROCHLORIDE 0.2 MG: 0.2 INJECTION, SOLUTION INTRAMUSCULAR; INTRAVENOUS; SUBCUTANEOUS at 02:45

## 2025-08-16 RX ADMIN — ONDANSETRON 4 MG: 2 INJECTION INTRAMUSCULAR; INTRAVENOUS at 09:06

## 2025-08-16 RX ADMIN — CEFEPIME 2000 MG: 2 INJECTION, POWDER, FOR SOLUTION INTRAVENOUS at 17:32

## 2025-08-16 RX ADMIN — CYANOCOBALAMIN TAB 500 MCG 1000 MCG: 500 TAB at 09:19

## 2025-08-16 RX ADMIN — OXYBUTYNIN CHLORIDE 5 MG: 5 TABLET, EXTENDED RELEASE ORAL at 09:19

## 2025-08-16 RX ADMIN — ATOVAQUONE 1500 MG: 750 SUSPENSION ORAL at 10:56

## 2025-08-16 RX ADMIN — PANTOPRAZOLE SODIUM 40 MG: 40 INJECTION, POWDER, FOR SOLUTION INTRAVENOUS at 21:59

## 2025-08-16 RX ADMIN — APIXABAN 5 MG: 5 TABLET, FILM COATED ORAL at 09:19

## 2025-08-16 RX ADMIN — NYSTATIN 500000 UNITS: 100000 SUSPENSION ORAL at 22:05

## 2025-08-17 LAB
ANION GAP SERPL CALCULATED.3IONS-SCNC: 15 MMOL/L (ref 4–13)
BASOPHILS # BLD AUTO: 0.02 THOUSANDS/ÂΜL (ref 0–0.1)
BASOPHILS NFR BLD AUTO: 1 % (ref 0–1)
BUN SERPL-MCNC: 49 MG/DL (ref 5–25)
CALCIUM SERPL-MCNC: 7.4 MG/DL (ref 8.4–10.2)
CHLORIDE SERPL-SCNC: 93 MMOL/L (ref 96–108)
CHLORIDE UR-SCNC: <15 MMOL/L
CO2 SERPL-SCNC: 22 MMOL/L (ref 21–32)
CREAT SERPL-MCNC: 2.16 MG/DL (ref 0.6–1.3)
EOSINOPHIL # BLD AUTO: 0.01 THOUSAND/ÂΜL (ref 0–0.61)
EOSINOPHIL NFR BLD AUTO: 0 % (ref 0–6)
ERYTHROCYTE [DISTWIDTH] IN BLOOD BY AUTOMATED COUNT: 14.3 % (ref 11.6–15.1)
GFR SERPL CREATININE-BSD FRML MDRD: 26 ML/MIN/1.73SQ M
GLUCOSE SERPL-MCNC: 211 MG/DL (ref 65–140)
GLUCOSE SERPL-MCNC: 216 MG/DL (ref 65–140)
GLUCOSE SERPL-MCNC: 218 MG/DL (ref 65–140)
GLUCOSE SERPL-MCNC: 220 MG/DL (ref 65–140)
GLUCOSE SERPL-MCNC: 223 MG/DL (ref 65–140)
HCT VFR BLD AUTO: 33.4 % (ref 34.8–46.1)
HGB BLD-MCNC: 10.5 G/DL (ref 11.5–15.4)
IMM GRANULOCYTES # BLD AUTO: 0.02 THOUSAND/UL (ref 0–0.2)
IMM GRANULOCYTES NFR BLD AUTO: 1 % (ref 0–2)
LYMPHOCYTES # BLD AUTO: 1.02 THOUSANDS/ÂΜL (ref 0.6–4.47)
LYMPHOCYTES NFR BLD AUTO: 26 % (ref 14–44)
MCH RBC QN AUTO: 30.3 PG (ref 26.8–34.3)
MCHC RBC AUTO-ENTMCNC: 31.4 G/DL (ref 31.4–37.4)
MCV RBC AUTO: 96 FL (ref 82–98)
MONOCYTES # BLD AUTO: 0.51 THOUSAND/ÂΜL (ref 0.17–1.22)
MONOCYTES NFR BLD AUTO: 13 % (ref 4–12)
NEUTROPHILS # BLD AUTO: 2.37 THOUSANDS/ÂΜL (ref 1.85–7.62)
NEUTS SEG NFR BLD AUTO: 59 % (ref 43–75)
NRBC BLD AUTO-RTO: 0 /100 WBCS
PLATELET # BLD AUTO: 146 THOUSANDS/UL (ref 149–390)
PMV BLD AUTO: 10.5 FL (ref 8.9–12.7)
POTASSIUM SERPL-SCNC: 5.2 MMOL/L (ref 3.5–5.3)
RBC # BLD AUTO: 3.47 MILLION/UL (ref 3.81–5.12)
SODIUM SERPL-SCNC: 130 MMOL/L (ref 135–147)
SODIUM UR-SCNC: <10 MMOL/L
TACROLIMUS BLD-MCNC: 23.9 NG/ML (ref 3–15)
WBC # BLD AUTO: 3.95 THOUSAND/UL (ref 4.31–10.16)

## 2025-08-17 PROCEDURE — 80197 ASSAY OF TACROLIMUS: CPT

## 2025-08-17 PROCEDURE — 85025 COMPLETE CBC W/AUTO DIFF WBC: CPT | Performed by: FAMILY MEDICINE

## 2025-08-17 PROCEDURE — 99232 SBSQ HOSP IP/OBS MODERATE 35: CPT | Performed by: FAMILY MEDICINE

## 2025-08-17 PROCEDURE — 80048 BASIC METABOLIC PNL TOTAL CA: CPT | Performed by: FAMILY MEDICINE

## 2025-08-17 PROCEDURE — 83935 ASSAY OF URINE OSMOLALITY: CPT

## 2025-08-17 PROCEDURE — 82436 ASSAY OF URINE CHLORIDE: CPT

## 2025-08-17 PROCEDURE — 84300 ASSAY OF URINE SODIUM: CPT

## 2025-08-17 PROCEDURE — 99233 SBSQ HOSP IP/OBS HIGH 50: CPT | Performed by: INTERNAL MEDICINE

## 2025-08-17 PROCEDURE — 94760 N-INVAS EAR/PLS OXIMETRY 1: CPT

## 2025-08-17 PROCEDURE — 82948 REAGENT STRIP/BLOOD GLUCOSE: CPT

## 2025-08-17 PROCEDURE — 99232 SBSQ HOSP IP/OBS MODERATE 35: CPT | Performed by: INTERNAL MEDICINE

## 2025-08-17 PROCEDURE — 94640 AIRWAY INHALATION TREATMENT: CPT

## 2025-08-17 RX ORDER — HYDROMORPHONE HCL IN WATER/PF 6 MG/30 ML
0.2 PATIENT CONTROLLED ANALGESIA SYRINGE INTRAVENOUS EVERY 6 HOURS PRN
Status: DISCONTINUED | OUTPATIENT
Start: 2025-08-17 | End: 2025-08-20 | Stop reason: HOSPADM

## 2025-08-17 RX ORDER — ALBUMIN (HUMAN) 12.5 G/50ML
12.5 SOLUTION INTRAVENOUS EVERY 8 HOURS
Status: COMPLETED | OUTPATIENT
Start: 2025-08-17 | End: 2025-08-18

## 2025-08-17 RX ORDER — HYDROMORPHONE HYDROCHLORIDE 2 MG/1
2 TABLET ORAL EVERY 4 HOURS PRN
Refills: 0 | Status: DISCONTINUED | OUTPATIENT
Start: 2025-08-17 | End: 2025-08-20 | Stop reason: HOSPADM

## 2025-08-17 RX ADMIN — SODIUM CHLORIDE, SODIUM GLUCONATE, SODIUM ACETATE, POTASSIUM CHLORIDE, MAGNESIUM CHLORIDE, SODIUM PHOSPHATE, DIBASIC, AND POTASSIUM PHOSPHATE 75 ML/HR: .53; .5; .37; .037; .03; .012; .00082 INJECTION, SOLUTION INTRAVENOUS at 00:49

## 2025-08-17 RX ADMIN — INSULIN LISPRO 1 UNITS: 100 INJECTION, SOLUTION INTRAVENOUS; SUBCUTANEOUS at 08:16

## 2025-08-17 RX ADMIN — INSULIN LISPRO 1 UNITS: 100 INJECTION, SOLUTION INTRAVENOUS; SUBCUTANEOUS at 22:16

## 2025-08-17 RX ADMIN — METOCLOPRAMIDE 5 MG: 5 INJECTION, SOLUTION INTRAMUSCULAR; INTRAVENOUS at 12:27

## 2025-08-17 RX ADMIN — INSULIN LISPRO 12 UNITS: 100 INJECTION, SOLUTION INTRAVENOUS; SUBCUTANEOUS at 17:05

## 2025-08-17 RX ADMIN — FERROUS SULFATE TAB 325 MG (65 MG ELEMENTAL FE) 325 MG: 325 (65 FE) TAB at 08:20

## 2025-08-17 RX ADMIN — MYCOPHENOLATE MOFETIL 250 MG: 250 CAPSULE ORAL at 17:03

## 2025-08-17 RX ADMIN — HYDROMORPHONE HYDROCHLORIDE 0.2 MG: 0.2 INJECTION, SOLUTION INTRAMUSCULAR; INTRAVENOUS; SUBCUTANEOUS at 00:50

## 2025-08-17 RX ADMIN — BUDESONIDE AND FORMOTEROL FUMARATE DIHYDRATE 2 PUFF: 160; 4.5 AEROSOL RESPIRATORY (INHALATION) at 22:30

## 2025-08-17 RX ADMIN — POLYETHYLENE GLYCOL 3350 17 G: 17 POWDER, FOR SOLUTION ORAL at 17:04

## 2025-08-17 RX ADMIN — METOCLOPRAMIDE 5 MG: 5 INJECTION, SOLUTION INTRAMUSCULAR; INTRAVENOUS at 05:41

## 2025-08-17 RX ADMIN — PANCRELIPASE 48000 UNITS: 120000; 24000; 76000 CAPSULE, DELAYED RELEASE PELLETS ORAL at 08:21

## 2025-08-17 RX ADMIN — DICYCLOMINE HYDROCHLORIDE 10 MG: 10 CAPSULE ORAL at 12:30

## 2025-08-17 RX ADMIN — NYSTATIN 500000 UNITS: 100000 SUSPENSION ORAL at 22:17

## 2025-08-17 RX ADMIN — PREGABALIN 150 MG: 75 CAPSULE ORAL at 08:19

## 2025-08-17 RX ADMIN — NYSTATIN 500000 UNITS: 100000 SUSPENSION ORAL at 12:08

## 2025-08-17 RX ADMIN — POLYETHYLENE GLYCOL 3350 17 G: 17 POWDER, FOR SOLUTION ORAL at 08:20

## 2025-08-17 RX ADMIN — PANCRELIPASE 48000 UNITS: 120000; 24000; 76000 CAPSULE, DELAYED RELEASE PELLETS ORAL at 17:05

## 2025-08-17 RX ADMIN — INSULIN GLARGINE 12 UNITS: 100 INJECTION, SOLUTION SUBCUTANEOUS at 08:14

## 2025-08-17 RX ADMIN — BUDESONIDE AND FORMOTEROL FUMARATE DIHYDRATE 2 PUFF: 160; 4.5 AEROSOL RESPIRATORY (INHALATION) at 08:22

## 2025-08-17 RX ADMIN — TACROLIMUS 3 MG: 0.5 CAPSULE ORAL at 08:18

## 2025-08-17 RX ADMIN — APIXABAN 5 MG: 5 TABLET, FILM COATED ORAL at 17:03

## 2025-08-17 RX ADMIN — DICYCLOMINE HYDROCHLORIDE 10 MG: 10 CAPSULE ORAL at 17:03

## 2025-08-17 RX ADMIN — NYSTATIN 500000 UNITS: 100000 SUSPENSION ORAL at 17:05

## 2025-08-17 RX ADMIN — HYDROMORPHONE HYDROCHLORIDE 2 MG: 2 TABLET ORAL at 18:04

## 2025-08-17 RX ADMIN — Medication 500 MG: at 08:20

## 2025-08-17 RX ADMIN — PANCRELIPASE 48000 UNITS: 120000; 24000; 76000 CAPSULE, DELAYED RELEASE PELLETS ORAL at 00:30

## 2025-08-17 RX ADMIN — PANCRELIPASE 48000 UNITS: 120000; 24000; 76000 CAPSULE, DELAYED RELEASE PELLETS ORAL at 13:00

## 2025-08-17 RX ADMIN — ALBUMIN (HUMAN) 12.5 G: 0.25 INJECTION, SOLUTION INTRAVENOUS at 17:02

## 2025-08-17 RX ADMIN — INSULIN LISPRO 10 UNITS: 100 INJECTION, SOLUTION INTRAVENOUS; SUBCUTANEOUS at 08:16

## 2025-08-17 RX ADMIN — HYDROMORPHONE HYDROCHLORIDE 0.2 MG: 0.2 INJECTION, SOLUTION INTRAMUSCULAR; INTRAVENOUS; SUBCUTANEOUS at 07:46

## 2025-08-17 RX ADMIN — TACROLIMUS 3 MG: 0.5 CAPSULE ORAL at 17:03

## 2025-08-17 RX ADMIN — INSULIN LISPRO 8 UNITS: 100 INJECTION, SOLUTION INTRAVENOUS; SUBCUTANEOUS at 12:09

## 2025-08-17 RX ADMIN — PREDNISONE 20 MG: 20 TABLET ORAL at 08:19

## 2025-08-17 RX ADMIN — PANTOPRAZOLE SODIUM 40 MG: 40 INJECTION, POWDER, FOR SOLUTION INTRAVENOUS at 08:05

## 2025-08-17 RX ADMIN — ALBUTEROL SULFATE 5 MG: 2.5 SOLUTION RESPIRATORY (INHALATION) at 07:09

## 2025-08-17 RX ADMIN — APIXABAN 5 MG: 5 TABLET, FILM COATED ORAL at 08:20

## 2025-08-17 RX ADMIN — DICYCLOMINE HYDROCHLORIDE 10 MG: 10 CAPSULE ORAL at 08:19

## 2025-08-17 RX ADMIN — DOCUSATE SODIUM 100 MG: 100 CAPSULE, LIQUID FILLED ORAL at 08:20

## 2025-08-17 RX ADMIN — Medication 5000 UNITS: at 08:18

## 2025-08-17 RX ADMIN — CYANOCOBALAMIN TAB 500 MCG 1000 MCG: 500 TAB at 08:20

## 2025-08-17 RX ADMIN — MYCOPHENOLATE MOFETIL 250 MG: 250 CAPSULE ORAL at 08:19

## 2025-08-17 RX ADMIN — HYDROMORPHONE HYDROCHLORIDE 2 MG: 2 TABLET ORAL at 22:35

## 2025-08-17 RX ADMIN — METOCLOPRAMIDE 5 MG: 5 INJECTION, SOLUTION INTRAMUSCULAR; INTRAVENOUS at 17:00

## 2025-08-17 RX ADMIN — PANTOPRAZOLE SODIUM 40 MG: 40 INJECTION, POWDER, FOR SOLUTION INTRAVENOUS at 22:16

## 2025-08-17 RX ADMIN — ALBUTEROL SULFATE 5 MG: 2.5 SOLUTION RESPIRATORY (INHALATION) at 19:20

## 2025-08-17 RX ADMIN — HYDROMORPHONE HYDROCHLORIDE 2 MG: 2 TABLET ORAL at 13:02

## 2025-08-17 RX ADMIN — ESCITALOPRAM OXALATE 15 MG: 10 TABLET ORAL at 08:18

## 2025-08-17 RX ADMIN — INSULIN LISPRO 1 UNITS: 100 INJECTION, SOLUTION INTRAVENOUS; SUBCUTANEOUS at 17:06

## 2025-08-17 RX ADMIN — Medication 500 MG: at 17:03

## 2025-08-17 RX ADMIN — INSULIN LISPRO 1 UNITS: 100 INJECTION, SOLUTION INTRAVENOUS; SUBCUTANEOUS at 12:10

## 2025-08-17 RX ADMIN — ALBUMIN (HUMAN) 12.5 G: 0.25 INJECTION, SOLUTION INTRAVENOUS at 08:33

## 2025-08-17 RX ADMIN — DICYCLOMINE HYDROCHLORIDE 10 MG: 10 CAPSULE ORAL at 22:16

## 2025-08-17 RX ADMIN — ATOVAQUONE 1500 MG: 750 SUSPENSION ORAL at 08:21

## 2025-08-17 RX ADMIN — METOCLOPRAMIDE 5 MG: 5 INJECTION, SOLUTION INTRAMUSCULAR; INTRAVENOUS at 00:50

## 2025-08-17 RX ADMIN — OXYBUTYNIN CHLORIDE 5 MG: 5 TABLET, EXTENDED RELEASE ORAL at 08:19

## 2025-08-17 RX ADMIN — OXYCODONE HYDROCHLORIDE AND ACETAMINOPHEN 250 MG: 500 TABLET ORAL at 08:19

## 2025-08-18 ENCOUNTER — APPOINTMENT (INPATIENT)
Dept: NUCLEAR MEDICINE | Facility: HOSPITAL | Age: 50
DRG: 872 | End: 2025-08-18
Payer: COMMERCIAL

## 2025-08-18 ENCOUNTER — APPOINTMENT (INPATIENT)
Dept: NON INVASIVE DIAGNOSTICS | Facility: HOSPITAL | Age: 50
DRG: 872 | End: 2025-08-18
Payer: COMMERCIAL

## 2025-08-18 LAB
ANION GAP SERPL CALCULATED.3IONS-SCNC: 6 MMOL/L (ref 4–13)
AORTIC ROOT: 3 CM
BASOPHILS # BLD AUTO: 0.01 THOUSANDS/ÂΜL (ref 0–0.1)
BASOPHILS NFR BLD AUTO: 1 % (ref 0–1)
BSA FOR ECHO PROCEDURE: 1.46 M2
BUN SERPL-MCNC: 46 MG/DL (ref 5–25)
CALCIUM SERPL-MCNC: 8.1 MG/DL (ref 8.4–10.2)
CHLORIDE SERPL-SCNC: 95 MMOL/L (ref 96–108)
CO2 SERPL-SCNC: 32 MMOL/L (ref 21–32)
CREAT SERPL-MCNC: 1.36 MG/DL (ref 0.6–1.3)
DOP CALC LVOT AREA: 2.27 CM2
DOP CALC LVOT DIAMETER: 1.7 CM
E WAVE DECELERATION TIME: 142 MS
E/A RATIO: 2.02
EOSINOPHIL # BLD AUTO: 0.01 THOUSAND/ÂΜL (ref 0–0.61)
EOSINOPHIL NFR BLD AUTO: 1 % (ref 0–6)
ERYTHROCYTE [DISTWIDTH] IN BLOOD BY AUTOMATED COUNT: 14 % (ref 11.6–15.1)
FRACTIONAL SHORTENING: 20 (ref 28–44)
GFR SERPL CREATININE-BSD FRML MDRD: 46 ML/MIN/1.73SQ M
GLUCOSE SERPL-MCNC: 107 MG/DL (ref 65–140)
GLUCOSE SERPL-MCNC: 146 MG/DL (ref 65–140)
GLUCOSE SERPL-MCNC: 226 MG/DL (ref 65–140)
GLUCOSE SERPL-MCNC: 273 MG/DL (ref 65–140)
GLUCOSE SERPL-MCNC: 285 MG/DL (ref 65–140)
HCT VFR BLD AUTO: 28.3 % (ref 34.8–46.1)
HGB BLD-MCNC: 9.2 G/DL (ref 11.5–15.4)
IMM GRANULOCYTES # BLD AUTO: 0.02 THOUSAND/UL (ref 0–0.2)
IMM GRANULOCYTES NFR BLD AUTO: 1 % (ref 0–2)
INTERVENTRICULAR SEPTUM IN DIASTOLE (PARASTERNAL SHORT AXIS VIEW): 0.8 CM
INTERVENTRICULAR SEPTUM: 0.8 CM (ref 0.6–1.1)
LEFT ATRIUM SIZE: 2.9 CM
LEFT INTERNAL DIMENSION IN SYSTOLE: 2.8 CM (ref 2.1–4)
LEFT VENTRICULAR INTERNAL DIMENSION IN DIASTOLE: 3.5 CM (ref 3.5–6)
LEFT VENTRICULAR POSTERIOR WALL IN END DIASTOLE: 0.8 CM
LEFT VENTRICULAR STROKE VOLUME: 21 ML
LVSV (TEICH): 21 ML
LYMPHOCYTES # BLD AUTO: 0.28 THOUSANDS/ÂΜL (ref 0.6–4.47)
LYMPHOCYTES NFR BLD AUTO: 18 % (ref 14–44)
MCH RBC QN AUTO: 30.7 PG (ref 26.8–34.3)
MCHC RBC AUTO-ENTMCNC: 32.5 G/DL (ref 31.4–37.4)
MCV RBC AUTO: 94 FL (ref 82–98)
MONOCYTES # BLD AUTO: 0.22 THOUSAND/ÂΜL (ref 0.17–1.22)
MONOCYTES NFR BLD AUTO: 14 % (ref 4–12)
MV PEAK A VEL: 0.4 M/S
MV PEAK E VEL: 81 CM/S
MV STENOSIS PRESSURE HALF TIME: 41 MS
MV VALVE AREA P 1/2 METHOD: 5.37
NEUTROPHILS # BLD AUTO: 1.06 THOUSANDS/ÂΜL (ref 1.85–7.62)
NEUTS SEG NFR BLD AUTO: 65 % (ref 43–75)
NRBC BLD AUTO-RTO: 0 /100 WBCS
OSMOLALITY UR: 360 MMOL/KG (ref 250–900)
PLATELET # BLD AUTO: 105 THOUSANDS/UL (ref 149–390)
PMV BLD AUTO: 9.9 FL (ref 8.9–12.7)
POTASSIUM SERPL-SCNC: 5.1 MMOL/L (ref 3.5–5.3)
RBC # BLD AUTO: 3 MILLION/UL (ref 3.81–5.12)
SL CV LV EF: 60
SL CV PED ECHO LEFT VENTRICLE DIASTOLIC VOLUME (MOD BIPLANE) 2D: 51 ML
SL CV PED ECHO LEFT VENTRICLE SYSTOLIC VOLUME (MOD BIPLANE) 2D: 30 ML
SODIUM SERPL-SCNC: 133 MMOL/L (ref 135–147)
TR MAX PG: 28 MMHG
TR PEAK VELOCITY: 2.6 M/S
TRICUSPID ANNULAR PLANE SYSTOLIC EXCURSION: 1.4 CM
TRICUSPID VALVE PEAK REGURGITATION VELOCITY: 2.64 M/S
WBC # BLD AUTO: 1.6 THOUSAND/UL (ref 4.31–10.16)

## 2025-08-18 PROCEDURE — 87186 SC STD MICRODIL/AGAR DIL: CPT | Performed by: FAMILY MEDICINE

## 2025-08-18 PROCEDURE — 87147 CULTURE TYPE IMMUNOLOGIC: CPT | Performed by: FAMILY MEDICINE

## 2025-08-18 PROCEDURE — 99233 SBSQ HOSP IP/OBS HIGH 50: CPT | Performed by: INTERNAL MEDICINE

## 2025-08-18 PROCEDURE — 93306 TTE W/DOPPLER COMPLETE: CPT | Performed by: INTERNAL MEDICINE

## 2025-08-18 PROCEDURE — 87070 CULTURE OTHR SPECIMN AEROBIC: CPT | Performed by: FAMILY MEDICINE

## 2025-08-18 PROCEDURE — 87205 SMEAR GRAM STAIN: CPT | Performed by: FAMILY MEDICINE

## 2025-08-18 PROCEDURE — 94760 N-INVAS EAR/PLS OXIMETRY 1: CPT

## 2025-08-18 PROCEDURE — 87077 CULTURE AEROBIC IDENTIFY: CPT | Performed by: FAMILY MEDICINE

## 2025-08-18 PROCEDURE — 93306 TTE W/DOPPLER COMPLETE: CPT

## 2025-08-18 PROCEDURE — 78582 LUNG VENTILAT&PERFUS IMAGING: CPT

## 2025-08-18 PROCEDURE — A9540 TC99M MAA: HCPCS

## 2025-08-18 PROCEDURE — A9558 XE133 XENON 10MCI: HCPCS

## 2025-08-18 PROCEDURE — 80048 BASIC METABOLIC PNL TOTAL CA: CPT | Performed by: FAMILY MEDICINE

## 2025-08-18 PROCEDURE — 99232 SBSQ HOSP IP/OBS MODERATE 35: CPT | Performed by: FAMILY MEDICINE

## 2025-08-18 PROCEDURE — 82948 REAGENT STRIP/BLOOD GLUCOSE: CPT

## 2025-08-18 PROCEDURE — 94640 AIRWAY INHALATION TREATMENT: CPT

## 2025-08-18 PROCEDURE — 85025 COMPLETE CBC W/AUTO DIFF WBC: CPT | Performed by: FAMILY MEDICINE

## 2025-08-18 RX ORDER — TACROLIMUS 0.5 MG/1
2 CAPSULE ORAL 2 TIMES DAILY
Status: DISCONTINUED | OUTPATIENT
Start: 2025-08-19 | End: 2025-08-20 | Stop reason: HOSPADM

## 2025-08-18 RX ADMIN — HYDROMORPHONE HYDROCHLORIDE 2 MG: 2 TABLET ORAL at 09:27

## 2025-08-18 RX ADMIN — AZITHROMYCIN 250 MG: 500 TABLET, FILM COATED ORAL at 09:13

## 2025-08-18 RX ADMIN — NYSTATIN 500000 UNITS: 100000 SUSPENSION ORAL at 21:45

## 2025-08-18 RX ADMIN — PANTOPRAZOLE SODIUM 40 MG: 40 INJECTION, POWDER, FOR SOLUTION INTRAVENOUS at 09:28

## 2025-08-18 RX ADMIN — PANCRELIPASE 48000 UNITS: 120000; 24000; 76000 CAPSULE, DELAYED RELEASE PELLETS ORAL at 09:09

## 2025-08-18 RX ADMIN — DICYCLOMINE HYDROCHLORIDE 10 MG: 10 CAPSULE ORAL at 13:29

## 2025-08-18 RX ADMIN — HYDROMORPHONE HYDROCHLORIDE 0.2 MG: 0.2 INJECTION, SOLUTION INTRAMUSCULAR; INTRAVENOUS; SUBCUTANEOUS at 21:45

## 2025-08-18 RX ADMIN — ALBUTEROL SULFATE 2.5 MG: 2.5 SOLUTION RESPIRATORY (INHALATION) at 07:14

## 2025-08-18 RX ADMIN — Medication 500 MG: at 09:16

## 2025-08-18 RX ADMIN — Medication 500 MG: at 17:12

## 2025-08-18 RX ADMIN — PREDNISONE 20 MG: 20 TABLET ORAL at 09:16

## 2025-08-18 RX ADMIN — DICYCLOMINE HYDROCHLORIDE 10 MG: 10 CAPSULE ORAL at 21:45

## 2025-08-18 RX ADMIN — METOCLOPRAMIDE 5 MG: 5 INJECTION, SOLUTION INTRAMUSCULAR; INTRAVENOUS at 17:15

## 2025-08-18 RX ADMIN — NYSTATIN 500000 UNITS: 100000 SUSPENSION ORAL at 09:08

## 2025-08-18 RX ADMIN — NYSTATIN 500000 UNITS: 100000 SUSPENSION ORAL at 17:13

## 2025-08-18 RX ADMIN — OXYBUTYNIN CHLORIDE 5 MG: 5 TABLET, EXTENDED RELEASE ORAL at 09:14

## 2025-08-18 RX ADMIN — PANCRELIPASE 48000 UNITS: 120000; 24000; 76000 CAPSULE, DELAYED RELEASE PELLETS ORAL at 17:14

## 2025-08-18 RX ADMIN — BUDESONIDE AND FORMOTEROL FUMARATE DIHYDRATE 2 PUFF: 160; 4.5 AEROSOL RESPIRATORY (INHALATION) at 19:46

## 2025-08-18 RX ADMIN — BUDESONIDE AND FORMOTEROL FUMARATE DIHYDRATE 2 PUFF: 160; 4.5 AEROSOL RESPIRATORY (INHALATION) at 09:20

## 2025-08-18 RX ADMIN — HYDROMORPHONE HYDROCHLORIDE 2 MG: 2 TABLET ORAL at 03:09

## 2025-08-18 RX ADMIN — APIXABAN 5 MG: 5 TABLET, FILM COATED ORAL at 17:12

## 2025-08-18 RX ADMIN — INSULIN LISPRO 2 UNITS: 100 INJECTION, SOLUTION INTRAVENOUS; SUBCUTANEOUS at 09:09

## 2025-08-18 RX ADMIN — METOCLOPRAMIDE 5 MG: 5 INJECTION, SOLUTION INTRAMUSCULAR; INTRAVENOUS at 11:49

## 2025-08-18 RX ADMIN — DICYCLOMINE HYDROCHLORIDE 10 MG: 10 CAPSULE ORAL at 08:00

## 2025-08-18 RX ADMIN — PANCRELIPASE 48000 UNITS: 120000; 24000; 76000 CAPSULE, DELAYED RELEASE PELLETS ORAL at 13:28

## 2025-08-18 RX ADMIN — PANTOPRAZOLE SODIUM 40 MG: 40 INJECTION, POWDER, FOR SOLUTION INTRAVENOUS at 21:45

## 2025-08-18 RX ADMIN — INSULIN LISPRO 10 UNITS: 100 INJECTION, SOLUTION INTRAVENOUS; SUBCUTANEOUS at 09:09

## 2025-08-18 RX ADMIN — METOCLOPRAMIDE 5 MG: 5 INJECTION, SOLUTION INTRAMUSCULAR; INTRAVENOUS at 00:37

## 2025-08-18 RX ADMIN — ESCITALOPRAM OXALATE 15 MG: 10 TABLET ORAL at 09:14

## 2025-08-18 RX ADMIN — MYCOPHENOLATE MOFETIL 250 MG: 250 CAPSULE ORAL at 17:12

## 2025-08-18 RX ADMIN — METOCLOPRAMIDE 5 MG: 5 INJECTION, SOLUTION INTRAMUSCULAR; INTRAVENOUS at 06:06

## 2025-08-18 RX ADMIN — ALBUMIN (HUMAN) 12.5 G: 0.25 INJECTION, SOLUTION INTRAVENOUS at 00:37

## 2025-08-18 RX ADMIN — POLYETHYLENE GLYCOL 3350 17 G: 17 POWDER, FOR SOLUTION ORAL at 09:20

## 2025-08-18 RX ADMIN — INSULIN GLARGINE 12 UNITS: 100 INJECTION, SOLUTION SUBCUTANEOUS at 09:10

## 2025-08-18 RX ADMIN — INSULIN LISPRO 8 UNITS: 100 INJECTION, SOLUTION INTRAVENOUS; SUBCUTANEOUS at 13:25

## 2025-08-18 RX ADMIN — HYDROMORPHONE HYDROCHLORIDE 0.2 MG: 0.2 INJECTION, SOLUTION INTRAMUSCULAR; INTRAVENOUS; SUBCUTANEOUS at 12:19

## 2025-08-18 RX ADMIN — APIXABAN 5 MG: 5 TABLET, FILM COATED ORAL at 09:15

## 2025-08-18 RX ADMIN — OXYCODONE HYDROCHLORIDE AND ACETAMINOPHEN 250 MG: 500 TABLET ORAL at 09:15

## 2025-08-18 RX ADMIN — DICYCLOMINE HYDROCHLORIDE 10 MG: 10 CAPSULE ORAL at 17:12

## 2025-08-18 RX ADMIN — ATOVAQUONE 1500 MG: 750 SUSPENSION ORAL at 09:08

## 2025-08-18 RX ADMIN — ALBUTEROL SULFATE 5 MG: 2.5 SOLUTION RESPIRATORY (INHALATION) at 19:10

## 2025-08-18 RX ADMIN — INSULIN LISPRO 2 UNITS: 100 INJECTION, SOLUTION INTRAVENOUS; SUBCUTANEOUS at 21:44

## 2025-08-18 RX ADMIN — NYSTATIN 500000 UNITS: 100000 SUSPENSION ORAL at 13:28

## 2025-08-18 RX ADMIN — MYCOPHENOLATE MOFETIL 250 MG: 250 CAPSULE ORAL at 09:12

## 2025-08-18 RX ADMIN — Medication 5000 UNITS: at 09:07

## 2025-08-18 RX ADMIN — HYDROMORPHONE HYDROCHLORIDE 2 MG: 2 TABLET ORAL at 19:39

## 2025-08-18 RX ADMIN — CYANOCOBALAMIN TAB 500 MCG 1000 MCG: 500 TAB at 09:13

## 2025-08-18 RX ADMIN — HYDROMORPHONE HYDROCHLORIDE 0.2 MG: 0.2 INJECTION, SOLUTION INTRAMUSCULAR; INTRAVENOUS; SUBCUTANEOUS at 04:19

## 2025-08-18 RX ADMIN — PREGABALIN 150 MG: 75 CAPSULE ORAL at 09:13

## 2025-08-19 DIAGNOSIS — K21.00 GASTROESOPHAGEAL REFLUX DISEASE WITH ESOPHAGITIS WITHOUT HEMORRHAGE: ICD-10-CM

## 2025-08-19 LAB
ANION GAP SERPL CALCULATED.3IONS-SCNC: 4 MMOL/L (ref 4–13)
BASOPHILS # BLD AUTO: 0.01 THOUSANDS/ÂΜL (ref 0–0.1)
BASOPHILS NFR BLD AUTO: 1 % (ref 0–1)
BUN SERPL-MCNC: 42 MG/DL (ref 5–25)
CALCIUM SERPL-MCNC: 9 MG/DL (ref 8.4–10.2)
CHLORIDE SERPL-SCNC: 100 MMOL/L (ref 96–108)
CO2 SERPL-SCNC: 35 MMOL/L (ref 21–32)
CREAT SERPL-MCNC: 0.91 MG/DL (ref 0.6–1.3)
EOSINOPHIL # BLD AUTO: 0.01 THOUSAND/ÂΜL (ref 0–0.61)
EOSINOPHIL NFR BLD AUTO: 1 % (ref 0–6)
ERYTHROCYTE [DISTWIDTH] IN BLOOD BY AUTOMATED COUNT: 14.1 % (ref 11.6–15.1)
GFR SERPL CREATININE-BSD FRML MDRD: 75 ML/MIN/1.73SQ M
GLUCOSE SERPL-MCNC: 107 MG/DL (ref 65–140)
GLUCOSE SERPL-MCNC: 116 MG/DL (ref 65–140)
GLUCOSE SERPL-MCNC: 138 MG/DL (ref 65–140)
GLUCOSE SERPL-MCNC: 140 MG/DL (ref 65–140)
GLUCOSE SERPL-MCNC: 56 MG/DL (ref 65–140)
HCT VFR BLD AUTO: 28.6 % (ref 34.8–46.1)
HGB BLD-MCNC: 8.9 G/DL (ref 11.5–15.4)
IMM GRANULOCYTES # BLD AUTO: 0.02 THOUSAND/UL (ref 0–0.2)
IMM GRANULOCYTES NFR BLD AUTO: 1 % (ref 0–2)
LYMPHOCYTES # BLD AUTO: 0.37 THOUSANDS/ÂΜL (ref 0.6–4.47)
LYMPHOCYTES NFR BLD AUTO: 18 % (ref 14–44)
MCH RBC QN AUTO: 30 PG (ref 26.8–34.3)
MCHC RBC AUTO-ENTMCNC: 31.1 G/DL (ref 31.4–37.4)
MCV RBC AUTO: 96 FL (ref 82–98)
MONOCYTES # BLD AUTO: 0.28 THOUSAND/ÂΜL (ref 0.17–1.22)
MONOCYTES NFR BLD AUTO: 14 % (ref 4–12)
NEUTROPHILS # BLD AUTO: 1.36 THOUSANDS/ÂΜL (ref 1.85–7.62)
NEUTS SEG NFR BLD AUTO: 65 % (ref 43–75)
NRBC BLD AUTO-RTO: 0 /100 WBCS
PLATELET # BLD AUTO: 119 THOUSANDS/UL (ref 149–390)
PMV BLD AUTO: 9.7 FL (ref 8.9–12.7)
POTASSIUM SERPL-SCNC: 4.9 MMOL/L (ref 3.5–5.3)
RBC # BLD AUTO: 2.97 MILLION/UL (ref 3.81–5.12)
SODIUM SERPL-SCNC: 139 MMOL/L (ref 135–147)
TACROLIMUS BLD-MCNC: 9.3 NG/ML (ref 3–15)
WBC # BLD AUTO: 2.05 THOUSAND/UL (ref 4.31–10.16)

## 2025-08-19 PROCEDURE — 94760 N-INVAS EAR/PLS OXIMETRY 1: CPT

## 2025-08-19 PROCEDURE — 80048 BASIC METABOLIC PNL TOTAL CA: CPT

## 2025-08-19 PROCEDURE — 99232 SBSQ HOSP IP/OBS MODERATE 35: CPT | Performed by: INTERNAL MEDICINE

## 2025-08-19 PROCEDURE — 80197 ASSAY OF TACROLIMUS: CPT | Performed by: FAMILY MEDICINE

## 2025-08-19 PROCEDURE — 82948 REAGENT STRIP/BLOOD GLUCOSE: CPT

## 2025-08-19 PROCEDURE — 94640 AIRWAY INHALATION TREATMENT: CPT

## 2025-08-19 PROCEDURE — 93005 ELECTROCARDIOGRAM TRACING: CPT

## 2025-08-19 PROCEDURE — 85025 COMPLETE CBC W/AUTO DIFF WBC: CPT | Performed by: INTERNAL MEDICINE

## 2025-08-19 RX ADMIN — INSULIN LISPRO 12 UNITS: 100 INJECTION, SOLUTION INTRAVENOUS; SUBCUTANEOUS at 18:13

## 2025-08-19 RX ADMIN — HYDROMORPHONE HYDROCHLORIDE 2 MG: 2 TABLET ORAL at 03:57

## 2025-08-19 RX ADMIN — DICYCLOMINE HYDROCHLORIDE 10 MG: 10 CAPSULE ORAL at 18:14

## 2025-08-19 RX ADMIN — DICYCLOMINE HYDROCHLORIDE 10 MG: 10 CAPSULE ORAL at 22:33

## 2025-08-19 RX ADMIN — DICYCLOMINE HYDROCHLORIDE 10 MG: 10 CAPSULE ORAL at 08:43

## 2025-08-19 RX ADMIN — NYSTATIN 500000 UNITS: 100000 SUSPENSION ORAL at 22:33

## 2025-08-19 RX ADMIN — APIXABAN 5 MG: 5 TABLET, FILM COATED ORAL at 08:43

## 2025-08-19 RX ADMIN — APIXABAN 5 MG: 5 TABLET, FILM COATED ORAL at 18:14

## 2025-08-19 RX ADMIN — OXYBUTYNIN CHLORIDE 5 MG: 5 TABLET, EXTENDED RELEASE ORAL at 08:43

## 2025-08-19 RX ADMIN — Medication 5000 UNITS: at 08:42

## 2025-08-19 RX ADMIN — ALBUTEROL SULFATE 5 MG: 2.5 SOLUTION RESPIRATORY (INHALATION) at 19:52

## 2025-08-19 RX ADMIN — Medication 500 MG: at 18:14

## 2025-08-19 RX ADMIN — PANCRELIPASE 48000 UNITS: 120000; 24000; 76000 CAPSULE, DELAYED RELEASE PELLETS ORAL at 11:44

## 2025-08-19 RX ADMIN — ESCITALOPRAM OXALATE 15 MG: 10 TABLET ORAL at 08:43

## 2025-08-19 RX ADMIN — PREDNISONE 20 MG: 20 TABLET ORAL at 08:42

## 2025-08-19 RX ADMIN — INSULIN LISPRO 8 UNITS: 100 INJECTION, SOLUTION INTRAVENOUS; SUBCUTANEOUS at 11:22

## 2025-08-19 RX ADMIN — DOCUSATE SODIUM 100 MG: 100 CAPSULE, LIQUID FILLED ORAL at 08:45

## 2025-08-19 RX ADMIN — MYCOPHENOLATE MOFETIL 250 MG: 250 CAPSULE ORAL at 18:14

## 2025-08-19 RX ADMIN — METOCLOPRAMIDE 5 MG: 5 INJECTION, SOLUTION INTRAMUSCULAR; INTRAVENOUS at 11:15

## 2025-08-19 RX ADMIN — INSULIN GLARGINE 12 UNITS: 100 INJECTION, SOLUTION SUBCUTANEOUS at 08:53

## 2025-08-19 RX ADMIN — TACROLIMUS 2 MG: 0.5 CAPSULE ORAL at 18:14

## 2025-08-19 RX ADMIN — ATOVAQUONE 1500 MG: 750 SUSPENSION ORAL at 08:44

## 2025-08-19 RX ADMIN — BUDESONIDE AND FORMOTEROL FUMARATE DIHYDRATE 2 PUFF: 160; 4.5 AEROSOL RESPIRATORY (INHALATION) at 08:44

## 2025-08-19 RX ADMIN — PANCRELIPASE 48000 UNITS: 120000; 24000; 76000 CAPSULE, DELAYED RELEASE PELLETS ORAL at 08:42

## 2025-08-19 RX ADMIN — BUDESONIDE AND FORMOTEROL FUMARATE DIHYDRATE 2 PUFF: 160; 4.5 AEROSOL RESPIRATORY (INHALATION) at 22:32

## 2025-08-19 RX ADMIN — OXYCODONE HYDROCHLORIDE AND ACETAMINOPHEN 250 MG: 500 TABLET ORAL at 08:43

## 2025-08-19 RX ADMIN — HYDROMORPHONE HYDROCHLORIDE 0.2 MG: 0.2 INJECTION, SOLUTION INTRAMUSCULAR; INTRAVENOUS; SUBCUTANEOUS at 22:27

## 2025-08-19 RX ADMIN — PANCRELIPASE 48000 UNITS: 120000; 24000; 76000 CAPSULE, DELAYED RELEASE PELLETS ORAL at 18:14

## 2025-08-19 RX ADMIN — CYANOCOBALAMIN TAB 500 MCG 1000 MCG: 500 TAB at 08:43

## 2025-08-19 RX ADMIN — NYSTATIN 500000 UNITS: 100000 SUSPENSION ORAL at 08:53

## 2025-08-19 RX ADMIN — DICYCLOMINE HYDROCHLORIDE 10 MG: 10 CAPSULE ORAL at 11:44

## 2025-08-19 RX ADMIN — ALBUTEROL SULFATE 5 MG: 2.5 SOLUTION RESPIRATORY (INHALATION) at 07:15

## 2025-08-19 RX ADMIN — MYCOPHENOLATE MOFETIL 250 MG: 250 CAPSULE ORAL at 08:43

## 2025-08-19 RX ADMIN — PANTOPRAZOLE SODIUM 40 MG: 40 INJECTION, POWDER, FOR SOLUTION INTRAVENOUS at 22:27

## 2025-08-19 RX ADMIN — HYDROMORPHONE HYDROCHLORIDE 2 MG: 2 TABLET ORAL at 10:05

## 2025-08-19 RX ADMIN — PREGABALIN 150 MG: 75 CAPSULE ORAL at 08:43

## 2025-08-19 RX ADMIN — FERROUS SULFATE TAB 325 MG (65 MG ELEMENTAL FE) 325 MG: 325 (65 FE) TAB at 08:43

## 2025-08-19 RX ADMIN — METOCLOPRAMIDE 5 MG: 5 INJECTION, SOLUTION INTRAMUSCULAR; INTRAVENOUS at 17:27

## 2025-08-19 RX ADMIN — TACROLIMUS 2 MG: 0.5 CAPSULE ORAL at 08:43

## 2025-08-19 RX ADMIN — Medication 500 MG: at 08:43

## 2025-08-19 RX ADMIN — DOCUSATE SODIUM 100 MG: 100 CAPSULE, LIQUID FILLED ORAL at 18:14

## 2025-08-19 RX ADMIN — POLYETHYLENE GLYCOL 3350 17 G: 17 POWDER, FOR SOLUTION ORAL at 18:14

## 2025-08-19 RX ADMIN — HYDROMORPHONE HYDROCHLORIDE 0.2 MG: 0.2 INJECTION, SOLUTION INTRAMUSCULAR; INTRAVENOUS; SUBCUTANEOUS at 06:25

## 2025-08-19 RX ADMIN — AMOXICILLIN AND CLAVULANATE POTASSIUM 1 TABLET: 875; 125 TABLET, FILM COATED ORAL at 09:59

## 2025-08-19 RX ADMIN — NYSTATIN 500000 UNITS: 100000 SUSPENSION ORAL at 11:44

## 2025-08-19 RX ADMIN — POLYETHYLENE GLYCOL 3350 17 G: 17 POWDER, FOR SOLUTION ORAL at 08:42

## 2025-08-19 RX ADMIN — HYDROMORPHONE HYDROCHLORIDE 0.2 MG: 0.2 INJECTION, SOLUTION INTRAMUSCULAR; INTRAVENOUS; SUBCUTANEOUS at 13:31

## 2025-08-19 RX ADMIN — PANTOPRAZOLE SODIUM 40 MG: 40 INJECTION, POWDER, FOR SOLUTION INTRAVENOUS at 08:05

## 2025-08-19 RX ADMIN — NYSTATIN 500000 UNITS: 100000 SUSPENSION ORAL at 18:14

## 2025-08-19 RX ADMIN — METOCLOPRAMIDE 5 MG: 5 INJECTION, SOLUTION INTRAMUSCULAR; INTRAVENOUS at 06:13

## 2025-08-19 RX ADMIN — HYDROMORPHONE HYDROCHLORIDE 2 MG: 2 TABLET ORAL at 19:50

## 2025-08-19 RX ADMIN — AMOXICILLIN AND CLAVULANATE POTASSIUM 1 TABLET: 875; 125 TABLET, FILM COATED ORAL at 22:33

## 2025-08-20 ENCOUNTER — TELEPHONE (OUTPATIENT)
Dept: INTERNAL MEDICINE CLINIC | Facility: CLINIC | Age: 50
End: 2025-08-20

## 2025-08-20 ENCOUNTER — PATIENT OUTREACH (OUTPATIENT)
Dept: CASE MANAGEMENT | Facility: OTHER | Age: 50
End: 2025-08-20

## 2025-08-20 VITALS
HEART RATE: 74 BPM | SYSTOLIC BLOOD PRESSURE: 116 MMHG | TEMPERATURE: 98.5 F | DIASTOLIC BLOOD PRESSURE: 64 MMHG | RESPIRATION RATE: 20 BRPM | HEIGHT: 62 IN | WEIGHT: 106 LBS | OXYGEN SATURATION: 97 % | BODY MASS INDEX: 19.51 KG/M2

## 2025-08-20 DIAGNOSIS — Z78.9 NEED FOR FOLLOW-UP BY SOCIAL WORKER: Primary | ICD-10-CM

## 2025-08-20 LAB
ANION GAP SERPL CALCULATED.3IONS-SCNC: 4 MMOL/L (ref 4–13)
ATRIAL RATE: 129 BPM
BACTERIA BLD CULT: NORMAL
BACTERIA BLD CULT: NORMAL
BASOPHILS # BLD AUTO: 0.01 THOUSANDS/ÂΜL (ref 0–0.1)
BASOPHILS NFR BLD AUTO: 1 % (ref 0–1)
BUN SERPL-MCNC: 32 MG/DL (ref 5–25)
CALCIUM SERPL-MCNC: 9.1 MG/DL (ref 8.4–10.2)
CHLORIDE SERPL-SCNC: 102 MMOL/L (ref 96–108)
CO2 SERPL-SCNC: 34 MMOL/L (ref 21–32)
CREAT SERPL-MCNC: 0.85 MG/DL (ref 0.6–1.3)
EOSINOPHIL # BLD AUTO: 0.01 THOUSAND/ÂΜL (ref 0–0.61)
EOSINOPHIL NFR BLD AUTO: 1 % (ref 0–6)
ERYTHROCYTE [DISTWIDTH] IN BLOOD BY AUTOMATED COUNT: 14.1 % (ref 11.6–15.1)
GFR SERPL CREATININE-BSD FRML MDRD: 81 ML/MIN/1.73SQ M
GLUCOSE SERPL-MCNC: 156 MG/DL (ref 65–140)
GLUCOSE SERPL-MCNC: 217 MG/DL (ref 65–140)
GLUCOSE SERPL-MCNC: 72 MG/DL (ref 65–140)
GLUCOSE SERPL-MCNC: 78 MG/DL (ref 65–140)
HCT VFR BLD AUTO: 30.8 % (ref 34.8–46.1)
HGB BLD-MCNC: 9.6 G/DL (ref 11.5–15.4)
IMM GRANULOCYTES # BLD AUTO: 0.01 THOUSAND/UL (ref 0–0.2)
IMM GRANULOCYTES NFR BLD AUTO: 1 % (ref 0–2)
LYMPHOCYTES # BLD AUTO: 0.53 THOUSANDS/ÂΜL (ref 0.6–4.47)
LYMPHOCYTES NFR BLD AUTO: 25 % (ref 14–44)
MCH RBC QN AUTO: 31 PG (ref 26.8–34.3)
MCHC RBC AUTO-ENTMCNC: 31.2 G/DL (ref 31.4–37.4)
MCV RBC AUTO: 99 FL (ref 82–98)
MONOCYTES # BLD AUTO: 0.32 THOUSAND/ÂΜL (ref 0.17–1.22)
MONOCYTES NFR BLD AUTO: 15 % (ref 4–12)
NEUTROPHILS # BLD AUTO: 1.24 THOUSANDS/ÂΜL (ref 1.85–7.62)
NEUTS SEG NFR BLD AUTO: 57 % (ref 43–75)
NRBC BLD AUTO-RTO: 0 /100 WBCS
PLATELET # BLD AUTO: 130 THOUSANDS/UL (ref 149–390)
PMV BLD AUTO: 9.4 FL (ref 8.9–12.7)
POTASSIUM SERPL-SCNC: 4.6 MMOL/L (ref 3.5–5.3)
PR INTERVAL: 128 MS
QRS AXIS: 60 DEGREES
QRSD INTERVAL: 70 MS
QT INTERVAL: 308 MS
QTC INTERVAL: 452 MS
RBC # BLD AUTO: 3.1 MILLION/UL (ref 3.81–5.12)
SODIUM SERPL-SCNC: 140 MMOL/L (ref 135–147)
T WAVE AXIS: 46 DEGREES
VENTRICULAR RATE: 129 BPM
WBC # BLD AUTO: 2.12 THOUSAND/UL (ref 4.31–10.16)

## 2025-08-20 PROCEDURE — 80048 BASIC METABOLIC PNL TOTAL CA: CPT | Performed by: INTERNAL MEDICINE

## 2025-08-20 PROCEDURE — 94640 AIRWAY INHALATION TREATMENT: CPT

## 2025-08-20 PROCEDURE — 99239 HOSP IP/OBS DSCHRG MGMT >30: CPT | Performed by: INTERNAL MEDICINE

## 2025-08-20 PROCEDURE — 93010 ELECTROCARDIOGRAM REPORT: CPT | Performed by: INTERNAL MEDICINE

## 2025-08-20 PROCEDURE — 85025 COMPLETE CBC W/AUTO DIFF WBC: CPT | Performed by: INTERNAL MEDICINE

## 2025-08-20 PROCEDURE — 82948 REAGENT STRIP/BLOOD GLUCOSE: CPT

## 2025-08-20 PROCEDURE — 94760 N-INVAS EAR/PLS OXIMETRY 1: CPT

## 2025-08-20 RX ORDER — DICYCLOMINE HYDROCHLORIDE 10 MG/1
10 CAPSULE ORAL 4 TIMES DAILY PRN
Qty: 90 CAPSULE | Refills: 0 | Status: SHIPPED | OUTPATIENT
Start: 2025-08-20

## 2025-08-20 RX ORDER — HEPARIN 100 UNIT/ML
500 SYRINGE INTRAVENOUS ONCE
Status: COMPLETED | OUTPATIENT
Start: 2025-08-20 | End: 2025-08-20

## 2025-08-20 RX ORDER — OXYCODONE HYDROCHLORIDE 5 MG/1
2.5 TABLET ORAL EVERY 8 HOURS PRN
Qty: 30 TABLET | Refills: 0 | Status: SHIPPED | OUTPATIENT
Start: 2025-08-20 | End: 2025-09-09

## 2025-08-20 RX ORDER — FAMOTIDINE 40 MG/1
40 TABLET, FILM COATED ORAL
Qty: 30 TABLET | Refills: 11 | Status: SHIPPED | OUTPATIENT
Start: 2025-08-20

## 2025-08-20 RX ADMIN — PANTOPRAZOLE SODIUM 40 MG: 40 INJECTION, POWDER, FOR SOLUTION INTRAVENOUS at 08:55

## 2025-08-20 RX ADMIN — DICYCLOMINE HYDROCHLORIDE 10 MG: 10 CAPSULE ORAL at 09:05

## 2025-08-20 RX ADMIN — ALBUTEROL SULFATE 5 MG: 2.5 SOLUTION RESPIRATORY (INHALATION) at 07:24

## 2025-08-20 RX ADMIN — INSULIN GLARGINE 12 UNITS: 100 INJECTION, SOLUTION SUBCUTANEOUS at 08:47

## 2025-08-20 RX ADMIN — PREDNISONE 20 MG: 20 TABLET ORAL at 08:48

## 2025-08-20 RX ADMIN — BUDESONIDE AND FORMOTEROL FUMARATE DIHYDRATE 2 PUFF: 160; 4.5 AEROSOL RESPIRATORY (INHALATION) at 09:00

## 2025-08-20 RX ADMIN — NYSTATIN 500000 UNITS: 100000 SUSPENSION ORAL at 08:57

## 2025-08-20 RX ADMIN — INSULIN LISPRO 1 UNITS: 100 INJECTION, SOLUTION INTRAVENOUS; SUBCUTANEOUS at 08:57

## 2025-08-20 RX ADMIN — TACROLIMUS 2 MG: 0.5 CAPSULE ORAL at 08:48

## 2025-08-20 RX ADMIN — MYCOPHENOLATE MOFETIL 250 MG: 250 CAPSULE ORAL at 08:48

## 2025-08-20 RX ADMIN — DICYCLOMINE HYDROCHLORIDE 10 MG: 10 CAPSULE ORAL at 13:03

## 2025-08-20 RX ADMIN — PANCRELIPASE 48000 UNITS: 120000; 24000; 76000 CAPSULE, DELAYED RELEASE PELLETS ORAL at 08:58

## 2025-08-20 RX ADMIN — HYDROMORPHONE HYDROCHLORIDE 2 MG: 2 TABLET ORAL at 08:46

## 2025-08-20 RX ADMIN — Medication 500 MG: at 08:47

## 2025-08-20 RX ADMIN — POLYETHYLENE GLYCOL 3350 17 G: 17 POWDER, FOR SOLUTION ORAL at 08:48

## 2025-08-20 RX ADMIN — AZITHROMYCIN 250 MG: 500 TABLET, FILM COATED ORAL at 08:47

## 2025-08-20 RX ADMIN — ESCITALOPRAM OXALATE 15 MG: 10 TABLET ORAL at 08:48

## 2025-08-20 RX ADMIN — PREGABALIN 150 MG: 75 CAPSULE ORAL at 08:48

## 2025-08-20 RX ADMIN — HYDROMORPHONE HYDROCHLORIDE 0.2 MG: 0.2 INJECTION, SOLUTION INTRAMUSCULAR; INTRAVENOUS; SUBCUTANEOUS at 05:34

## 2025-08-20 RX ADMIN — AMOXICILLIN AND CLAVULANATE POTASSIUM 1 TABLET: 875; 125 TABLET, FILM COATED ORAL at 08:48

## 2025-08-20 RX ADMIN — OXYCODONE HYDROCHLORIDE AND ACETAMINOPHEN 250 MG: 500 TABLET ORAL at 08:48

## 2025-08-20 RX ADMIN — HYDROMORPHONE HYDROCHLORIDE 0.2 MG: 0.2 INJECTION, SOLUTION INTRAMUSCULAR; INTRAVENOUS; SUBCUTANEOUS at 11:30

## 2025-08-20 RX ADMIN — HEPARIN 500 UNITS: 100 SYRINGE at 12:58

## 2025-08-20 RX ADMIN — INSULIN LISPRO 10 UNITS: 100 INJECTION, SOLUTION INTRAVENOUS; SUBCUTANEOUS at 08:58

## 2025-08-20 RX ADMIN — METOCLOPRAMIDE 5 MG: 5 INJECTION, SOLUTION INTRAMUSCULAR; INTRAVENOUS at 05:34

## 2025-08-20 RX ADMIN — APIXABAN 5 MG: 5 TABLET, FILM COATED ORAL at 08:48

## 2025-08-20 RX ADMIN — DOCUSATE SODIUM 100 MG: 100 CAPSULE, LIQUID FILLED ORAL at 08:59

## 2025-08-20 RX ADMIN — OXYBUTYNIN CHLORIDE 5 MG: 5 TABLET, EXTENDED RELEASE ORAL at 08:48

## 2025-08-20 RX ADMIN — CYANOCOBALAMIN TAB 500 MCG 1000 MCG: 500 TAB at 08:48

## 2025-08-20 RX ADMIN — PANCRELIPASE 48000 UNITS: 120000; 24000; 76000 CAPSULE, DELAYED RELEASE PELLETS ORAL at 13:02

## 2025-08-20 RX ADMIN — Medication 5000 UNITS: at 08:48

## 2025-08-20 RX ADMIN — ATOVAQUONE 1500 MG: 750 SUSPENSION ORAL at 08:59

## 2025-08-20 RX ADMIN — HYDROMORPHONE HYDROCHLORIDE 2 MG: 2 TABLET ORAL at 04:05

## 2025-08-21 ENCOUNTER — TRANSITIONAL CARE MANAGEMENT (OUTPATIENT)
Dept: INTERNAL MEDICINE CLINIC | Facility: CLINIC | Age: 50
End: 2025-08-21

## 2025-08-22 ENCOUNTER — PATIENT OUTREACH (OUTPATIENT)
Dept: CASE MANAGEMENT | Facility: OTHER | Age: 50
End: 2025-08-22

## 2025-08-22 LAB
BACTERIA SPT RESP CULT: ABNORMAL
GRAM STN SPEC: ABNORMAL

## (undated) DEVICE — SYRINGE 50ML LL

## (undated) DEVICE — SINGLE-USE DIGITAL FLEXIBLE URETEROSCOPE: Brand: APTRA

## (undated) DEVICE — GLOVE SRG BIOGEL 8

## (undated) DEVICE — PACK TUR

## (undated) DEVICE — FIBER STD QUANTA 272 MICRON

## (undated) DEVICE — VALVE SUCTION-IRR 2.5MM ADJ UROSEAL

## (undated) DEVICE — 3M™ TEGADERM™ TRANSPARENT FILM DRESSING FRAME STYLE, 1624W, 2-3/8 IN X 2-3/4 IN (6 CM X 7 CM), 100/CT 4CT/CASE: Brand: 3M™ TEGADERM™

## (undated) DEVICE — SHEATH URETERAL ACCESS 12/14FR 35CM PROXIS

## (undated) DEVICE — GUIDEWIRE STRGHT TIP 0.038 IN SOLO PLUS

## (undated) DEVICE — BASKET SPECIMEN RETRIVAL 1.9FR 120CM

## (undated) DEVICE — CATH URET .038 10FR 50CM DUAL LUMEN

## (undated) DEVICE — SYRINGE 10ML LL

## (undated) DEVICE — SHEATH URETERAL ACCESS 11/13FR 35CM PROXIS

## (undated) DEVICE — INVIEW CLEAR LEGGINGS: Brand: CONVERTORS

## (undated) DEVICE — GUIDEWIRE STRGHT TIP 0.035 IN  SOLO PLUS

## (undated) DEVICE — Device

## (undated) DEVICE — UROCATCH BAG

## (undated) DEVICE — CATH URETERAL 5FR X 70 CM FLEX TIP POLYUR BARD

## (undated) DEVICE — LASER FIBER HOLMIUM 272MICRON

## (undated) DEVICE — PREMIUM DRY TRAY LF: Brand: MEDLINE INDUSTRIES, INC.

## (undated) DEVICE — SPECIMEN CONTAINER STERILE PEEL PACK

## (undated) DEVICE — GLOVE SRG BIOGEL ECLIPSE 7.5